# Patient Record
Sex: FEMALE | Race: WHITE | NOT HISPANIC OR LATINO | Employment: OTHER | URBAN - METROPOLITAN AREA
[De-identification: names, ages, dates, MRNs, and addresses within clinical notes are randomized per-mention and may not be internally consistent; named-entity substitution may affect disease eponyms.]

---

## 2017-01-23 ENCOUNTER — ALLSCRIPTS OFFICE VISIT (OUTPATIENT)
Dept: OTHER | Facility: OTHER | Age: 57
End: 2017-01-23

## 2017-01-23 DIAGNOSIS — E11.9 TYPE 2 DIABETES MELLITUS WITHOUT COMPLICATIONS (HCC): ICD-10-CM

## 2017-01-23 DIAGNOSIS — E03.9 HYPOTHYROIDISM: ICD-10-CM

## 2017-01-23 DIAGNOSIS — R26.2 DIFFICULTY IN WALKING, NOT ELSEWHERE CLASSIFIED: ICD-10-CM

## 2017-01-23 DIAGNOSIS — I95.9 HYPOTENSION: ICD-10-CM

## 2017-01-23 DIAGNOSIS — E87.5 HYPERKALEMIA: ICD-10-CM

## 2017-01-23 DIAGNOSIS — Z00.00 ENCOUNTER FOR GENERAL ADULT MEDICAL EXAMINATION WITHOUT ABNORMAL FINDINGS: ICD-10-CM

## 2017-01-23 DIAGNOSIS — D64.9 ANEMIA: ICD-10-CM

## 2017-02-08 ENCOUNTER — APPOINTMENT (EMERGENCY)
Dept: RADIOLOGY | Facility: HOSPITAL | Age: 57
End: 2017-02-08
Payer: COMMERCIAL

## 2017-02-08 ENCOUNTER — HOSPITAL ENCOUNTER (EMERGENCY)
Facility: HOSPITAL | Age: 57
Discharge: HOME | End: 2017-02-08
Attending: EMERGENCY MEDICINE | Admitting: EMERGENCY MEDICINE
Payer: COMMERCIAL

## 2017-02-08 VITALS
TEMPERATURE: 97.3 F | RESPIRATION RATE: 18 BRPM | SYSTOLIC BLOOD PRESSURE: 102 MMHG | OXYGEN SATURATION: 94 % | WEIGHT: 101 LBS | DIASTOLIC BLOOD PRESSURE: 55 MMHG | HEART RATE: 60 BPM | BODY MASS INDEX: 21.11 KG/M2

## 2017-02-08 DIAGNOSIS — S05.12XA: Primary | ICD-10-CM

## 2017-02-08 DIAGNOSIS — I95.9 HYPOTENSION: ICD-10-CM

## 2017-02-08 DIAGNOSIS — R26.2 AMBULATORY DYSFUNCTION: ICD-10-CM

## 2017-02-08 PROCEDURE — 99284 EMERGENCY DEPT VISIT MOD MDM: CPT

## 2017-02-08 PROCEDURE — 70450 CT HEAD/BRAIN W/O DYE: CPT

## 2017-03-06 DIAGNOSIS — F79 INTELLECTUAL DISABILITY: ICD-10-CM

## 2017-03-06 DIAGNOSIS — R30.0 DYSURIA: ICD-10-CM

## 2017-03-06 DIAGNOSIS — F41.9 ANXIETY DISORDER: ICD-10-CM

## 2017-03-06 DIAGNOSIS — G80.9 CEREBRAL PALSY (HCC): ICD-10-CM

## 2017-03-06 DIAGNOSIS — R26.2 DIFFICULTY IN WALKING, NOT ELSEWHERE CLASSIFIED: ICD-10-CM

## 2017-03-06 DIAGNOSIS — J18.9 PNEUMONIA: ICD-10-CM

## 2017-03-10 ENCOUNTER — APPOINTMENT (OUTPATIENT)
Dept: LAB | Age: 57
End: 2017-03-10
Payer: COMMERCIAL

## 2017-03-10 ENCOUNTER — TRANSCRIBE ORDERS (OUTPATIENT)
Dept: ADMINISTRATIVE | Age: 57
End: 2017-03-10

## 2017-03-10 DIAGNOSIS — R30.0 DYSURIA: ICD-10-CM

## 2017-03-10 LAB
BACTERIA UR QL AUTO: ABNORMAL /HPF
BILIRUB UR QL STRIP: NEGATIVE
CAOX CRY URNS QL MICRO: ABNORMAL /HPF
CLARITY UR: ABNORMAL
COLOR UR: YELLOW
GLUCOSE UR STRIP-MCNC: NEGATIVE MG/DL
HGB UR QL STRIP.AUTO: NEGATIVE
KETONES UR STRIP-MCNC: NEGATIVE MG/DL
LEUKOCYTE ESTERASE UR QL STRIP: ABNORMAL
NITRITE UR QL STRIP: POSITIVE
NON-SQ EPI CELLS URNS QL MICRO: ABNORMAL /HPF
PH UR STRIP.AUTO: 7 [PH] (ref 4.5–8)
PROT UR STRIP-MCNC: NEGATIVE MG/DL
RBC #/AREA URNS AUTO: ABNORMAL /HPF
SP GR UR STRIP.AUTO: 1.02 (ref 1–1.03)
UROBILINOGEN UR QL STRIP.AUTO: 0.2 E.U./DL
WBC #/AREA URNS AUTO: ABNORMAL /HPF

## 2017-03-10 PROCEDURE — 87077 CULTURE AEROBIC IDENTIFY: CPT

## 2017-03-10 PROCEDURE — 87186 SC STD MICRODIL/AGAR DIL: CPT

## 2017-03-10 PROCEDURE — 87086 URINE CULTURE/COLONY COUNT: CPT

## 2017-03-10 PROCEDURE — 81001 URINALYSIS AUTO W/SCOPE: CPT

## 2017-03-12 LAB — BACTERIA UR CULT: NORMAL

## 2017-03-23 ENCOUNTER — ALLSCRIPTS OFFICE VISIT (OUTPATIENT)
Dept: OTHER | Facility: OTHER | Age: 57
End: 2017-03-23

## 2017-03-27 ENCOUNTER — HOSPITAL ENCOUNTER (INPATIENT)
Facility: HOSPITAL | Age: 57
LOS: 1 days | Discharge: HOME HEALTH | DRG: 194 | End: 2017-03-29
Attending: EMERGENCY MEDICINE | Admitting: FAMILY MEDICINE
Payer: COMMERCIAL

## 2017-03-27 ENCOUNTER — APPOINTMENT (EMERGENCY)
Dept: RADIOLOGY | Facility: HOSPITAL | Age: 57
DRG: 194 | End: 2017-03-27
Payer: COMMERCIAL

## 2017-03-27 ENCOUNTER — GENERIC CONVERSION - ENCOUNTER (OUTPATIENT)
Dept: OTHER | Facility: OTHER | Age: 57
End: 2017-03-27

## 2017-03-27 DIAGNOSIS — R79.89 LACTATE BLOOD INCREASE: ICD-10-CM

## 2017-03-27 DIAGNOSIS — J18.9 COMMUNITY ACQUIRED PNEUMONIA: Primary | ICD-10-CM

## 2017-03-27 DIAGNOSIS — R09.02 HYPOXIA: ICD-10-CM

## 2017-03-27 PROBLEM — E78.5 HLD (HYPERLIPIDEMIA): Status: ACTIVE | Noted: 2017-03-27

## 2017-03-27 PROBLEM — G80.9 CP (CEREBRAL PALSY) (HCC): Status: ACTIVE | Noted: 2017-03-27

## 2017-03-27 PROBLEM — R25.2 SPASTICITY: Status: ACTIVE | Noted: 2017-03-27

## 2017-03-27 PROBLEM — K21.9 GERD (GASTROESOPHAGEAL REFLUX DISEASE): Status: ACTIVE | Noted: 2017-03-27

## 2017-03-27 PROBLEM — F31.9 BIPOLAR 1 DISORDER (HCC): Status: ACTIVE | Noted: 2017-03-27

## 2017-03-27 PROBLEM — F39 MOOD DISORDER (HCC): Status: ACTIVE | Noted: 2017-03-27

## 2017-03-27 PROBLEM — I10 HTN (HYPERTENSION): Status: ACTIVE | Noted: 2017-03-27

## 2017-03-27 PROBLEM — R26.2 AMBULATORY DYSFUNCTION: Status: ACTIVE | Noted: 2017-03-27

## 2017-03-27 PROBLEM — N39.0 CHRONIC UTI: Status: ACTIVE | Noted: 2017-03-27

## 2017-03-27 LAB
ALBUMIN SERPL BCP-MCNC: 2.5 G/DL (ref 3.5–5)
ALP SERPL-CCNC: 50 U/L (ref 46–116)
ALT SERPL W P-5'-P-CCNC: 14 U/L (ref 12–78)
ANION GAP SERPL CALCULATED.3IONS-SCNC: 5 MMOL/L (ref 4–13)
APTT PPP: 22 SECONDS (ref 24–36)
AST SERPL W P-5'-P-CCNC: 22 U/L (ref 5–45)
BASOPHILS # BLD AUTO: 0.02 THOUSANDS/ΜL (ref 0–0.1)
BASOPHILS NFR BLD AUTO: 0 % (ref 0–1)
BILIRUB SERPL-MCNC: 0.21 MG/DL (ref 0.2–1)
BUN SERPL-MCNC: 16 MG/DL (ref 5–25)
CALCIUM SERPL-MCNC: 9.5 MG/DL (ref 8.3–10.1)
CHLORIDE SERPL-SCNC: 102 MMOL/L (ref 100–108)
CO2 SERPL-SCNC: 33 MMOL/L (ref 21–32)
CREAT SERPL-MCNC: 0.89 MG/DL (ref 0.6–1.3)
EOSINOPHIL # BLD AUTO: 0.01 THOUSAND/ΜL (ref 0–0.61)
EOSINOPHIL NFR BLD AUTO: 0 % (ref 0–6)
ERYTHROCYTE [DISTWIDTH] IN BLOOD BY AUTOMATED COUNT: 13.3 % (ref 11.6–15.1)
GFR SERPL CREATININE-BSD FRML MDRD: >60 ML/MIN/1.73SQ M
GLUCOSE SERPL-MCNC: 156 MG/DL (ref 65–140)
GLUCOSE SERPL-MCNC: 191 MG/DL (ref 65–140)
HCT VFR BLD AUTO: 35.7 % (ref 34.8–46.1)
HGB BLD-MCNC: 11.8 G/DL (ref 11.5–15.4)
INR PPP: 0.99 (ref 0.86–1.16)
L PNEUMO1 AG UR QL IA.RAPID: NEGATIVE
LACTATE SERPL-SCNC: 1.4 MMOL/L (ref 0.5–2)
LACTATE SERPL-SCNC: 3.5 MMOL/L (ref 0.5–2)
LIPASE SERPL-CCNC: 139 U/L (ref 73–393)
LYMPHOCYTES # BLD AUTO: 1.11 THOUSANDS/ΜL (ref 0.6–4.47)
LYMPHOCYTES NFR BLD AUTO: 9 % (ref 14–44)
MCH RBC QN AUTO: 32.8 PG (ref 26.8–34.3)
MCHC RBC AUTO-ENTMCNC: 33.1 G/DL (ref 31.4–37.4)
MCV RBC AUTO: 99 FL (ref 82–98)
MONOCYTES # BLD AUTO: 0.93 THOUSAND/ΜL (ref 0.17–1.22)
MONOCYTES NFR BLD AUTO: 8 % (ref 4–12)
NEUTROPHILS # BLD AUTO: 9.77 THOUSANDS/ΜL (ref 1.85–7.62)
NEUTS SEG NFR BLD AUTO: 83 % (ref 43–75)
NRBC BLD AUTO-RTO: 0 /100 WBCS
PLATELET # BLD AUTO: 232 THOUSANDS/UL (ref 149–390)
PMV BLD AUTO: 10.7 FL (ref 8.9–12.7)
POTASSIUM SERPL-SCNC: 4.9 MMOL/L (ref 3.5–5.3)
PROT SERPL-MCNC: 7.3 G/DL (ref 6.4–8.2)
PROTHROMBIN TIME: 13.2 SECONDS (ref 12–14.3)
RBC # BLD AUTO: 3.6 MILLION/UL (ref 3.81–5.12)
S PNEUM AG UR QL: NEGATIVE
SODIUM SERPL-SCNC: 140 MMOL/L (ref 136–145)
SPECIMEN SOURCE: NORMAL
TROPONIN I BLD-MCNC: 0 NG/ML (ref 0–0.08)
WBC # BLD AUTO: 11.9 THOUSAND/UL (ref 4.31–10.16)

## 2017-03-27 PROCEDURE — 74177 CT ABD & PELVIS W/CONTRAST: CPT

## 2017-03-27 PROCEDURE — 96360 HYDRATION IV INFUSION INIT: CPT

## 2017-03-27 PROCEDURE — 87449 NOS EACH ORGANISM AG IA: CPT | Performed by: FAMILY MEDICINE

## 2017-03-27 PROCEDURE — 83605 ASSAY OF LACTIC ACID: CPT | Performed by: EMERGENCY MEDICINE

## 2017-03-27 PROCEDURE — 87040 BLOOD CULTURE FOR BACTERIA: CPT | Performed by: EMERGENCY MEDICINE

## 2017-03-27 PROCEDURE — 80053 COMPREHEN METABOLIC PANEL: CPT | Performed by: EMERGENCY MEDICINE

## 2017-03-27 PROCEDURE — 94760 N-INVAS EAR/PLS OXIMETRY 1: CPT

## 2017-03-27 PROCEDURE — 85610 PROTHROMBIN TIME: CPT | Performed by: EMERGENCY MEDICINE

## 2017-03-27 PROCEDURE — 93005 ELECTROCARDIOGRAM TRACING: CPT | Performed by: FAMILY MEDICINE

## 2017-03-27 PROCEDURE — 36415 COLL VENOUS BLD VENIPUNCTURE: CPT | Performed by: EMERGENCY MEDICINE

## 2017-03-27 PROCEDURE — 99285 EMERGENCY DEPT VISIT HI MDM: CPT

## 2017-03-27 PROCEDURE — 83690 ASSAY OF LIPASE: CPT | Performed by: EMERGENCY MEDICINE

## 2017-03-27 PROCEDURE — 71020 HB CHEST X-RAY 2VW FRONTAL&LATL: CPT

## 2017-03-27 PROCEDURE — 85730 THROMBOPLASTIN TIME PARTIAL: CPT | Performed by: EMERGENCY MEDICINE

## 2017-03-27 PROCEDURE — 96361 HYDRATE IV INFUSION ADD-ON: CPT

## 2017-03-27 PROCEDURE — 82948 REAGENT STRIP/BLOOD GLUCOSE: CPT

## 2017-03-27 PROCEDURE — 85025 COMPLETE CBC W/AUTO DIFF WBC: CPT | Performed by: EMERGENCY MEDICINE

## 2017-03-27 PROCEDURE — 84484 ASSAY OF TROPONIN QUANT: CPT

## 2017-03-27 PROCEDURE — 93005 ELECTROCARDIOGRAM TRACING: CPT | Performed by: EMERGENCY MEDICINE

## 2017-03-27 RX ORDER — OMEPRAZOLE 20 MG/1
20 CAPSULE, DELAYED RELEASE ORAL DAILY
COMMUNITY
End: 2019-11-26

## 2017-03-27 RX ORDER — MELATONIN
2000 DAILY
Status: DISCONTINUED | OUTPATIENT
Start: 2017-03-28 | End: 2017-03-29 | Stop reason: HOSPADM

## 2017-03-27 RX ORDER — LORAZEPAM 0.5 MG/1
0.5 TABLET ORAL 2 TIMES DAILY
Status: DISCONTINUED | OUTPATIENT
Start: 2017-03-27 | End: 2017-03-29 | Stop reason: HOSPADM

## 2017-03-27 RX ORDER — CITALOPRAM 20 MG/1
40 TABLET ORAL EVERY MORNING
Status: DISCONTINUED | OUTPATIENT
Start: 2017-03-28 | End: 2017-03-29 | Stop reason: HOSPADM

## 2017-03-27 RX ORDER — DIVALPROEX SODIUM 500 MG/1
1000 TABLET, DELAYED RELEASE ORAL DAILY
Status: DISCONTINUED | OUTPATIENT
Start: 2017-03-28 | End: 2017-03-29 | Stop reason: HOSPADM

## 2017-03-27 RX ORDER — PANTOPRAZOLE SODIUM 20 MG/1
20 TABLET, DELAYED RELEASE ORAL
Status: DISCONTINUED | OUTPATIENT
Start: 2017-03-28 | End: 2017-03-29 | Stop reason: HOSPADM

## 2017-03-27 RX ORDER — NITROFURANTOIN MACROCRYSTALS 50 MG/1
50 CAPSULE ORAL
Status: DISCONTINUED | OUTPATIENT
Start: 2017-03-27 | End: 2017-03-29 | Stop reason: HOSPADM

## 2017-03-27 RX ORDER — CALCIUM CARBONATE 500(1250)
1000 TABLET ORAL DAILY
Status: DISCONTINUED | OUTPATIENT
Start: 2017-03-28 | End: 2017-03-29 | Stop reason: HOSPADM

## 2017-03-27 RX ORDER — OXYBUTYNIN CHLORIDE 5 MG/1
5 TABLET, EXTENDED RELEASE ORAL 3 TIMES DAILY
Status: DISCONTINUED | OUTPATIENT
Start: 2017-03-27 | End: 2017-03-29 | Stop reason: HOSPADM

## 2017-03-27 RX ORDER — ARIPIPRAZOLE 15 MG/1
30 TABLET ORAL
Status: DISCONTINUED | OUTPATIENT
Start: 2017-03-27 | End: 2017-03-27 | Stop reason: CLARIF

## 2017-03-27 RX ORDER — CEFTRIAXONE 2 G/1
2000 INJECTION, POWDER, FOR SOLUTION INTRAMUSCULAR; INTRAVENOUS ONCE
Status: DISCONTINUED | OUTPATIENT
Start: 2017-03-27 | End: 2017-03-27

## 2017-03-27 RX ORDER — LORATADINE 10 MG/1
10 TABLET ORAL DAILY
Status: DISCONTINUED | OUTPATIENT
Start: 2017-03-28 | End: 2017-03-29 | Stop reason: HOSPADM

## 2017-03-27 RX ORDER — DOCUSATE SODIUM 100 MG/1
100 CAPSULE, LIQUID FILLED ORAL 2 TIMES DAILY
Status: DISCONTINUED | OUTPATIENT
Start: 2017-03-27 | End: 2017-03-29 | Stop reason: HOSPADM

## 2017-03-27 RX ORDER — OLOPATADINE HYDROCHLORIDE 2 MG/ML
1 SOLUTION/ DROPS OPHTHALMIC AS NEEDED
COMMUNITY

## 2017-03-27 RX ORDER — PRAVASTATIN SODIUM 20 MG
20 TABLET ORAL
Status: DISCONTINUED | OUTPATIENT
Start: 2017-03-28 | End: 2017-03-29 | Stop reason: HOSPADM

## 2017-03-27 RX ORDER — FEXOFENADINE HCL 180 MG/1
180 TABLET ORAL AS NEEDED
COMMUNITY

## 2017-03-27 RX ORDER — LEVOTHYROXINE SODIUM 0.03 MG/1
25 TABLET ORAL
Status: DISCONTINUED | OUTPATIENT
Start: 2017-03-28 | End: 2017-03-29 | Stop reason: HOSPADM

## 2017-03-27 RX ORDER — TRAZODONE HYDROCHLORIDE 100 MG/1
100 TABLET ORAL
Status: DISCONTINUED | OUTPATIENT
Start: 2017-03-27 | End: 2017-03-29 | Stop reason: HOSPADM

## 2017-03-27 RX ORDER — SODIUM CHLORIDE AND POTASSIUM CHLORIDE .9; .15 G/100ML; G/100ML
100 SOLUTION INTRAVENOUS CONTINUOUS
Status: DISCONTINUED | OUTPATIENT
Start: 2017-03-27 | End: 2017-03-29

## 2017-03-27 RX ORDER — ACETAMINOPHEN 325 MG/1
650 TABLET ORAL ONCE
Status: COMPLETED | OUTPATIENT
Start: 2017-03-27 | End: 2017-03-27

## 2017-03-27 RX ORDER — AZITHROMYCIN 250 MG/1
250 TABLET, FILM COATED ORAL EVERY 24 HOURS
Status: DISCONTINUED | OUTPATIENT
Start: 2017-03-28 | End: 2017-03-28

## 2017-03-27 RX ORDER — ARIPIPRAZOLE 15 MG/1
30 TABLET ORAL
Status: DISCONTINUED | OUTPATIENT
Start: 2017-03-27 | End: 2017-03-29 | Stop reason: HOSPADM

## 2017-03-27 RX ORDER — FERROUS SULFATE 325(65) MG
325 TABLET ORAL 2 TIMES DAILY WITH MEALS
Status: DISCONTINUED | OUTPATIENT
Start: 2017-03-28 | End: 2017-03-29 | Stop reason: HOSPADM

## 2017-03-27 RX ORDER — AZITHROMYCIN 250 MG/1
250 TABLET, FILM COATED ORAL ONCE
Status: DISCONTINUED | OUTPATIENT
Start: 2017-03-28 | End: 2017-03-27

## 2017-03-27 RX ORDER — ACETAMINOPHEN 325 MG/1
650 TABLET ORAL EVERY 6 HOURS PRN
Status: DISCONTINUED | OUTPATIENT
Start: 2017-03-27 | End: 2017-03-29 | Stop reason: HOSPADM

## 2017-03-27 RX ORDER — POLYETHYLENE GLYCOL 3350 17 G/17G
17 POWDER, FOR SOLUTION ORAL EVERY MORNING
Status: DISCONTINUED | OUTPATIENT
Start: 2017-03-28 | End: 2017-03-29 | Stop reason: HOSPADM

## 2017-03-27 RX ORDER — ALENDRONATE SODIUM 70 MG/1
70 TABLET ORAL
Status: DISCONTINUED | OUTPATIENT
Start: 2017-03-27 | End: 2017-03-27

## 2017-03-27 RX ADMIN — TRAZODONE HYDROCHLORIDE 100 MG: 100 TABLET ORAL at 23:25

## 2017-03-27 RX ADMIN — ACETAMINOPHEN 650 MG: 325 TABLET, FILM COATED ORAL at 16:12

## 2017-03-27 RX ADMIN — SODIUM CHLORIDE 1000 ML: 0.9 INJECTION, SOLUTION INTRAVENOUS at 18:11

## 2017-03-27 RX ADMIN — ARIPIPRAZOLE 30 MG: 15 TABLET ORAL at 23:23

## 2017-03-27 RX ADMIN — DOCUSATE SODIUM 100 MG: 100 CAPSULE, LIQUID FILLED ORAL at 21:53

## 2017-03-27 RX ADMIN — LORAZEPAM 0.5 MG: 0.5 TABLET ORAL at 21:53

## 2017-03-27 RX ADMIN — OXYBUTYNIN CHLORIDE 5 MG: 5 TABLET, EXTENDED RELEASE ORAL at 23:24

## 2017-03-27 RX ADMIN — IOHEXOL 85 ML: 350 INJECTION, SOLUTION INTRAVENOUS at 17:44

## 2017-03-27 RX ADMIN — AZITHROMYCIN MONOHYDRATE 500 MG: 500 INJECTION, POWDER, LYOPHILIZED, FOR SOLUTION INTRAVENOUS at 23:07

## 2017-03-27 RX ADMIN — SODIUM CHLORIDE 1000 ML: 0.9 INJECTION, SOLUTION INTRAVENOUS at 16:27

## 2017-03-27 RX ADMIN — CEFTRIAXONE 1000 MG: 1 INJECTION, POWDER, FOR SOLUTION INTRAMUSCULAR; INTRAVENOUS at 22:20

## 2017-03-27 RX ADMIN — NITROFURANTOIN (MACROCRYSTALS) 50 MG: 50 CAPSULE ORAL at 23:24

## 2017-03-27 RX ADMIN — METFORMIN HYDROCHLORIDE 1000 MG: 500 TABLET, FILM COATED ORAL at 21:53

## 2017-03-28 ENCOUNTER — APPOINTMENT (INPATIENT)
Dept: OCCUPATIONAL THERAPY | Facility: HOSPITAL | Age: 57
DRG: 194 | End: 2017-03-28
Payer: COMMERCIAL

## 2017-03-28 LAB
ANION GAP SERPL CALCULATED.3IONS-SCNC: 4 MMOL/L (ref 4–13)
ATRIAL RATE: 166 BPM
ATRIAL RATE: 59 BPM
BUN SERPL-MCNC: 9 MG/DL (ref 5–25)
CALCIUM SERPL-MCNC: 7.9 MG/DL (ref 8.3–10.1)
CHLORIDE SERPL-SCNC: 114 MMOL/L (ref 100–108)
CO2 SERPL-SCNC: 31 MMOL/L (ref 21–32)
CREAT SERPL-MCNC: 0.64 MG/DL (ref 0.6–1.3)
ERYTHROCYTE [DISTWIDTH] IN BLOOD BY AUTOMATED COUNT: 13.6 % (ref 11.6–15.1)
GFR SERPL CREATININE-BSD FRML MDRD: >60 ML/MIN/1.73SQ M
GLUCOSE P FAST SERPL-MCNC: 82 MG/DL (ref 65–99)
GLUCOSE SERPL-MCNC: 119 MG/DL (ref 65–140)
GLUCOSE SERPL-MCNC: 134 MG/DL (ref 65–140)
GLUCOSE SERPL-MCNC: 158 MG/DL (ref 65–140)
GLUCOSE SERPL-MCNC: 82 MG/DL (ref 65–140)
GLUCOSE SERPL-MCNC: 88 MG/DL (ref 65–140)
HCT VFR BLD AUTO: 30.3 % (ref 34.8–46.1)
HGB BLD-MCNC: 9.8 G/DL (ref 11.5–15.4)
MCH RBC QN AUTO: 32.3 PG (ref 26.8–34.3)
MCHC RBC AUTO-ENTMCNC: 32.3 G/DL (ref 31.4–37.4)
MCV RBC AUTO: 100 FL (ref 82–98)
P AXIS: 11 DEGREES
PLATELET # BLD AUTO: 193 THOUSANDS/UL (ref 149–390)
PMV BLD AUTO: 10.7 FL (ref 8.9–12.7)
POTASSIUM SERPL-SCNC: 4.1 MMOL/L (ref 3.5–5.3)
PR INTERVAL: 136 MS
QRS AXIS: 71 DEGREES
QRS AXIS: 86 DEGREES
QRSD INTERVAL: 60 MS
QRSD INTERVAL: 70 MS
QT INTERVAL: 348 MS
QT INTERVAL: 460 MS
QTC INTERVAL: 416 MS
QTC INTERVAL: 455 MS
RBC # BLD AUTO: 3.03 MILLION/UL (ref 3.81–5.12)
SODIUM SERPL-SCNC: 149 MMOL/L (ref 136–145)
T WAVE AXIS: 63 DEGREES
T WAVE AXIS: 75 DEGREES
VENTRICULAR RATE: 59 BPM
VENTRICULAR RATE: 86 BPM
WBC # BLD AUTO: 8.57 THOUSAND/UL (ref 4.31–10.16)

## 2017-03-28 PROCEDURE — 97166 OT EVAL MOD COMPLEX 45 MIN: CPT

## 2017-03-28 PROCEDURE — 87086 URINE CULTURE/COLONY COUNT: CPT | Performed by: FAMILY MEDICINE

## 2017-03-28 PROCEDURE — G8988 SELF CARE GOAL STATUS: HCPCS

## 2017-03-28 PROCEDURE — G8980 MOBILITY D/C STATUS: HCPCS

## 2017-03-28 PROCEDURE — G8987 SELF CARE CURRENT STATUS: HCPCS

## 2017-03-28 PROCEDURE — 82948 REAGENT STRIP/BLOOD GLUCOSE: CPT

## 2017-03-28 PROCEDURE — 85027 COMPLETE CBC AUTOMATED: CPT | Performed by: FAMILY MEDICINE

## 2017-03-28 PROCEDURE — 97162 PT EVAL MOD COMPLEX 30 MIN: CPT

## 2017-03-28 PROCEDURE — G8979 MOBILITY GOAL STATUS: HCPCS

## 2017-03-28 PROCEDURE — G8978 MOBILITY CURRENT STATUS: HCPCS

## 2017-03-28 PROCEDURE — 80048 BASIC METABOLIC PNL TOTAL CA: CPT | Performed by: FAMILY MEDICINE

## 2017-03-28 PROCEDURE — G8989 SELF CARE D/C STATUS: HCPCS

## 2017-03-28 RX ADMIN — NITROFURANTOIN (MACROCRYSTALS) 50 MG: 50 CAPSULE ORAL at 22:02

## 2017-03-28 RX ADMIN — LORAZEPAM 0.5 MG: 0.5 TABLET ORAL at 08:54

## 2017-03-28 RX ADMIN — OXYBUTYNIN CHLORIDE 5 MG: 5 TABLET, EXTENDED RELEASE ORAL at 15:53

## 2017-03-28 RX ADMIN — Medication 1000 MG: at 08:55

## 2017-03-28 RX ADMIN — Medication 325 MG: at 15:53

## 2017-03-28 RX ADMIN — CEFEPIME HYDROCHLORIDE 2000 MG: 1 INJECTION, POWDER, FOR SOLUTION INTRAMUSCULAR; INTRAVENOUS at 23:13

## 2017-03-28 RX ADMIN — CITALOPRAM HYDROBROMIDE 40 MG: 20 TABLET ORAL at 08:54

## 2017-03-28 RX ADMIN — ARIPIPRAZOLE 30 MG: 15 TABLET ORAL at 22:02

## 2017-03-28 RX ADMIN — ENOXAPARIN SODIUM 40 MG: 40 INJECTION SUBCUTANEOUS at 08:54

## 2017-03-28 RX ADMIN — SODIUM CHLORIDE AND POTASSIUM CHLORIDE 100 ML/HR: .9; .15 SOLUTION INTRAVENOUS at 11:42

## 2017-03-28 RX ADMIN — OXYBUTYNIN CHLORIDE 5 MG: 5 TABLET, EXTENDED RELEASE ORAL at 22:02

## 2017-03-28 RX ADMIN — SODIUM CHLORIDE 500 ML: 0.9 INJECTION, SOLUTION INTRAVENOUS at 00:34

## 2017-03-28 RX ADMIN — Medication 325 MG: at 08:54

## 2017-03-28 RX ADMIN — PANTOPRAZOLE SODIUM 20 MG: 20 TABLET, DELAYED RELEASE ORAL at 05:05

## 2017-03-28 RX ADMIN — METFORMIN HYDROCHLORIDE 1000 MG: 500 TABLET, FILM COATED ORAL at 17:40

## 2017-03-28 RX ADMIN — SODIUM CHLORIDE AND POTASSIUM CHLORIDE 100 ML/HR: .9; .15 SOLUTION INTRAVENOUS at 01:38

## 2017-03-28 RX ADMIN — TRAZODONE HYDROCHLORIDE 100 MG: 100 TABLET ORAL at 22:02

## 2017-03-28 RX ADMIN — VITAMIN D, TAB 1000IU (100/BT) 2000 UNITS: 25 TAB at 08:54

## 2017-03-28 RX ADMIN — SODIUM CHLORIDE AND POTASSIUM CHLORIDE 100 ML/HR: .9; .15 SOLUTION INTRAVENOUS at 22:03

## 2017-03-28 RX ADMIN — OXYBUTYNIN CHLORIDE 5 MG: 5 TABLET, EXTENDED RELEASE ORAL at 08:55

## 2017-03-28 RX ADMIN — LORAZEPAM 0.5 MG: 0.5 TABLET ORAL at 17:40

## 2017-03-28 RX ADMIN — DIVALPROEX SODIUM 1000 MG: 500 TABLET, DELAYED RELEASE ORAL at 08:55

## 2017-03-28 RX ADMIN — METFORMIN HYDROCHLORIDE 1000 MG: 500 TABLET, FILM COATED ORAL at 08:54

## 2017-03-28 RX ADMIN — LEVOTHYROXINE SODIUM 25 MCG: 25 TABLET ORAL at 05:05

## 2017-03-28 RX ADMIN — INSULIN LISPRO 1 UNITS: 100 INJECTION, SOLUTION INTRAVENOUS; SUBCUTANEOUS at 22:02

## 2017-03-28 RX ADMIN — GENTAMICIN SULFATE 230 MG: 40 INJECTION, SOLUTION INTRAMUSCULAR; INTRAVENOUS at 17:36

## 2017-03-28 RX ADMIN — PRAVASTATIN SODIUM 20 MG: 20 TABLET ORAL at 15:53

## 2017-03-28 RX ADMIN — LORATADINE 10 MG: 10 TABLET ORAL at 08:54

## 2017-03-29 VITALS
BODY MASS INDEX: 21.51 KG/M2 | HEIGHT: 60 IN | RESPIRATION RATE: 16 BRPM | SYSTOLIC BLOOD PRESSURE: 107 MMHG | HEART RATE: 77 BPM | OXYGEN SATURATION: 97 % | TEMPERATURE: 97.8 F | DIASTOLIC BLOOD PRESSURE: 62 MMHG | WEIGHT: 109.57 LBS

## 2017-03-29 LAB
ANION GAP SERPL CALCULATED.3IONS-SCNC: 5 MMOL/L (ref 4–13)
BACTERIA UR CULT: NORMAL
BASOPHILS # BLD AUTO: 0.01 THOUSANDS/ΜL (ref 0–0.1)
BASOPHILS NFR BLD AUTO: 0 % (ref 0–1)
BUN SERPL-MCNC: 8 MG/DL (ref 5–25)
CALCIUM SERPL-MCNC: 9.4 MG/DL (ref 8.3–10.1)
CHLORIDE SERPL-SCNC: 115 MMOL/L (ref 100–108)
CO2 SERPL-SCNC: 32 MMOL/L (ref 21–32)
CREAT SERPL-MCNC: 0.74 MG/DL (ref 0.6–1.3)
EOSINOPHIL # BLD AUTO: 0.26 THOUSAND/ΜL (ref 0–0.61)
EOSINOPHIL NFR BLD AUTO: 3 % (ref 0–6)
ERYTHROCYTE [DISTWIDTH] IN BLOOD BY AUTOMATED COUNT: 14.1 % (ref 11.6–15.1)
GFR SERPL CREATININE-BSD FRML MDRD: >60 ML/MIN/1.73SQ M
GLUCOSE SERPL-MCNC: 107 MG/DL (ref 65–140)
GLUCOSE SERPL-MCNC: 120 MG/DL (ref 65–140)
GLUCOSE SERPL-MCNC: 96 MG/DL (ref 65–140)
HCT VFR BLD AUTO: 32.5 % (ref 34.8–46.1)
HGB BLD-MCNC: 10.3 G/DL (ref 11.5–15.4)
LYMPHOCYTES # BLD AUTO: 2.01 THOUSANDS/ΜL (ref 0.6–4.47)
LYMPHOCYTES NFR BLD AUTO: 24 % (ref 14–44)
MCH RBC QN AUTO: 32.5 PG (ref 26.8–34.3)
MCHC RBC AUTO-ENTMCNC: 31.7 G/DL (ref 31.4–37.4)
MCV RBC AUTO: 103 FL (ref 82–98)
MONOCYTES # BLD AUTO: 0.94 THOUSAND/ΜL (ref 0.17–1.22)
MONOCYTES NFR BLD AUTO: 11 % (ref 4–12)
NEUTROPHILS # BLD AUTO: 4.99 THOUSANDS/ΜL (ref 1.85–7.62)
NEUTS SEG NFR BLD AUTO: 62 % (ref 43–75)
NRBC BLD AUTO-RTO: 0 /100 WBCS
PLATELET # BLD AUTO: 227 THOUSANDS/UL (ref 149–390)
PMV BLD AUTO: 11.3 FL (ref 8.9–12.7)
POTASSIUM SERPL-SCNC: 4.9 MMOL/L (ref 3.5–5.3)
RBC # BLD AUTO: 3.17 MILLION/UL (ref 3.81–5.12)
SODIUM SERPL-SCNC: 152 MMOL/L (ref 136–145)
WBC # BLD AUTO: 8.25 THOUSAND/UL (ref 4.31–10.16)

## 2017-03-29 PROCEDURE — 82948 REAGENT STRIP/BLOOD GLUCOSE: CPT

## 2017-03-29 PROCEDURE — 85025 COMPLETE CBC W/AUTO DIFF WBC: CPT | Performed by: FAMILY MEDICINE

## 2017-03-29 PROCEDURE — 80048 BASIC METABOLIC PNL TOTAL CA: CPT | Performed by: FAMILY MEDICINE

## 2017-03-29 RX ORDER — MILK BASED FORMULA
1 PUDDING (GRAM) ORAL
Qty: 100 CAN | Refills: 3 | Status: SHIPPED | OUTPATIENT
Start: 2017-03-29 | End: 2018-06-08

## 2017-03-29 RX ORDER — DOXYCYCLINE 100 MG/1
100 TABLET ORAL 2 TIMES DAILY
Qty: 12 TABLET | Refills: 0 | Status: SHIPPED | OUTPATIENT
Start: 2017-03-29 | End: 2017-04-04

## 2017-03-29 RX ORDER — NITROFURANTOIN MACROCRYSTALS 50 MG/1
50 CAPSULE ORAL
Qty: 30 CAPSULE | Refills: 0 | Status: SHIPPED | OUTPATIENT
Start: 2017-03-29 | End: 2017-04-28

## 2017-03-29 RX ADMIN — LORAZEPAM 0.5 MG: 0.5 TABLET ORAL at 08:28

## 2017-03-29 RX ADMIN — PANTOPRAZOLE SODIUM 20 MG: 20 TABLET, DELAYED RELEASE ORAL at 05:24

## 2017-03-29 RX ADMIN — Medication 325 MG: at 08:28

## 2017-03-29 RX ADMIN — LORATADINE 10 MG: 10 TABLET ORAL at 08:27

## 2017-03-29 RX ADMIN — LEVOTHYROXINE SODIUM 25 MCG: 25 TABLET ORAL at 05:24

## 2017-03-29 RX ADMIN — Medication 1000 MG: at 08:30

## 2017-03-29 RX ADMIN — VITAMIN D, TAB 1000IU (100/BT) 2000 UNITS: 25 TAB at 08:27

## 2017-03-29 RX ADMIN — CEFEPIME HYDROCHLORIDE 2000 MG: 1 INJECTION, POWDER, FOR SOLUTION INTRAMUSCULAR; INTRAVENOUS at 08:27

## 2017-03-29 RX ADMIN — DIVALPROEX SODIUM 1000 MG: 500 TABLET, DELAYED RELEASE ORAL at 08:31

## 2017-03-29 RX ADMIN — METFORMIN HYDROCHLORIDE 1000 MG: 500 TABLET, FILM COATED ORAL at 08:27

## 2017-03-29 RX ADMIN — OXYBUTYNIN CHLORIDE 5 MG: 5 TABLET, EXTENDED RELEASE ORAL at 08:30

## 2017-03-29 RX ADMIN — ENOXAPARIN SODIUM 40 MG: 40 INJECTION SUBCUTANEOUS at 08:28

## 2017-03-29 RX ADMIN — CITALOPRAM HYDROBROMIDE 40 MG: 20 TABLET ORAL at 08:27

## 2017-04-01 LAB
BACTERIA BLD CULT: NORMAL
BACTERIA BLD CULT: NORMAL

## 2017-04-05 ENCOUNTER — ALLSCRIPTS OFFICE VISIT (OUTPATIENT)
Dept: OTHER | Facility: OTHER | Age: 57
End: 2017-04-05

## 2017-04-14 ENCOUNTER — TRANSCRIBE ORDERS (OUTPATIENT)
Dept: ADMINISTRATIVE | Facility: HOSPITAL | Age: 57
End: 2017-04-14

## 2017-04-14 ENCOUNTER — APPOINTMENT (OUTPATIENT)
Dept: LAB | Facility: MEDICAL CENTER | Age: 57
End: 2017-04-14
Payer: COMMERCIAL

## 2017-04-14 DIAGNOSIS — F31.9 BIPOLAR DISORDER, UNSPECIFIED (HCC): ICD-10-CM

## 2017-04-14 DIAGNOSIS — D64.9 ANEMIA, UNSPECIFIED: Primary | ICD-10-CM

## 2017-04-14 DIAGNOSIS — E11.9 TYPE 2 DIABETES MELLITUS WITHOUT COMPLICATION, UNSPECIFIED LONG TERM INSULIN USE STATUS: ICD-10-CM

## 2017-04-14 DIAGNOSIS — D64.9 ANEMIA, UNSPECIFIED: ICD-10-CM

## 2017-04-14 DIAGNOSIS — E87.5 HYPERPOTASSEMIA: ICD-10-CM

## 2017-04-14 DIAGNOSIS — E03.9 UNSPECIFIED HYPOTHYROIDISM: ICD-10-CM

## 2017-04-14 DIAGNOSIS — I95.9 HYPOTENSION, UNSPECIFIED: ICD-10-CM

## 2017-04-14 DIAGNOSIS — F31.9 BIPOLAR DISORDER, UNSPECIFIED (HCC): Primary | ICD-10-CM

## 2017-04-14 LAB
ALBUMIN SERPL BCP-MCNC: 2.9 G/DL (ref 3.5–5)
ALP SERPL-CCNC: 48 U/L (ref 46–116)
ALT SERPL W P-5'-P-CCNC: 21 U/L (ref 12–78)
AMMONIA PLAS-SCNC: <10 UMOL/L (ref 11–35)
ANION GAP SERPL CALCULATED.3IONS-SCNC: 6 MMOL/L (ref 4–13)
AST SERPL W P-5'-P-CCNC: 17 U/L (ref 5–45)
BASOPHILS # BLD AUTO: 0.02 THOUSANDS/ΜL (ref 0–0.1)
BASOPHILS NFR BLD AUTO: 0 % (ref 0–1)
BILIRUB DIRECT SERPL-MCNC: 0.1 MG/DL (ref 0–0.2)
BILIRUB SERPL-MCNC: 0.2 MG/DL (ref 0.2–1)
BUN SERPL-MCNC: 14 MG/DL (ref 5–25)
CALCIUM SERPL-MCNC: 9 MG/DL (ref 8.3–10.1)
CHLORIDE SERPL-SCNC: 104 MMOL/L (ref 100–108)
CK SERPL-CCNC: 29 U/L (ref 26–192)
CO2 SERPL-SCNC: 35 MMOL/L (ref 21–32)
CREAT SERPL-MCNC: 0.74 MG/DL (ref 0.6–1.3)
EOSINOPHIL # BLD AUTO: 0.35 THOUSAND/ΜL (ref 0–0.61)
EOSINOPHIL NFR BLD AUTO: 6 % (ref 0–6)
ERYTHROCYTE [DISTWIDTH] IN BLOOD BY AUTOMATED COUNT: 13.9 % (ref 11.6–15.1)
EST. AVERAGE GLUCOSE BLD GHB EST-MCNC: 140 MG/DL
GFR SERPL CREATININE-BSD FRML MDRD: >60 ML/MIN/1.73SQ M
GLUCOSE P FAST SERPL-MCNC: 103 MG/DL (ref 65–99)
HBA1C MFR BLD: 6.5 % (ref 4.2–6.3)
HCT VFR BLD AUTO: 36.9 % (ref 34.8–46.1)
HGB BLD-MCNC: 11.7 G/DL (ref 11.5–15.4)
LYMPHOCYTES # BLD AUTO: 1.82 THOUSANDS/ΜL (ref 0.6–4.47)
LYMPHOCYTES NFR BLD AUTO: 32 % (ref 14–44)
MCH RBC QN AUTO: 32.1 PG (ref 26.8–34.3)
MCHC RBC AUTO-ENTMCNC: 31.7 G/DL (ref 31.4–37.4)
MCV RBC AUTO: 101 FL (ref 82–98)
MONOCYTES # BLD AUTO: 0.42 THOUSAND/ΜL (ref 0.17–1.22)
MONOCYTES NFR BLD AUTO: 7 % (ref 4–12)
NEUTROPHILS # BLD AUTO: 3.12 THOUSANDS/ΜL (ref 1.85–7.62)
NEUTS SEG NFR BLD AUTO: 55 % (ref 43–75)
PLATELET # BLD AUTO: 200 THOUSANDS/UL (ref 149–390)
PMV BLD AUTO: 11.7 FL (ref 8.9–12.7)
POTASSIUM SERPL-SCNC: 4.6 MMOL/L (ref 3.5–5.3)
PROT SERPL-MCNC: 7 G/DL (ref 6.4–8.2)
RBC # BLD AUTO: 3.64 MILLION/UL (ref 3.81–5.12)
SODIUM SERPL-SCNC: 145 MMOL/L (ref 136–145)
T3 SERPL-MCNC: 0.8 NG/ML (ref 0.6–1.8)
T4 SERPL-MCNC: 10.4 UG/DL (ref 4.7–13.3)
TSH SERPL DL<=0.05 MIU/L-ACNC: 2.23 UIU/ML (ref 0.36–3.74)
VALPROATE SERPL-MCNC: 29 UG/ML (ref 50–100)
WBC # BLD AUTO: 5.73 THOUSAND/UL (ref 4.31–10.16)

## 2017-04-14 PROCEDURE — 80053 COMPREHEN METABOLIC PANEL: CPT

## 2017-04-14 PROCEDURE — 36415 COLL VENOUS BLD VENIPUNCTURE: CPT | Performed by: FAMILY MEDICINE

## 2017-04-14 PROCEDURE — 83036 HEMOGLOBIN GLYCOSYLATED A1C: CPT | Performed by: FAMILY MEDICINE

## 2017-04-14 PROCEDURE — 82140 ASSAY OF AMMONIA: CPT

## 2017-04-14 PROCEDURE — 84443 ASSAY THYROID STIM HORMONE: CPT

## 2017-04-14 PROCEDURE — 85025 COMPLETE CBC W/AUTO DIFF WBC: CPT

## 2017-04-14 PROCEDURE — 84436 ASSAY OF TOTAL THYROXINE: CPT

## 2017-04-14 PROCEDURE — 82550 ASSAY OF CK (CPK): CPT

## 2017-04-14 PROCEDURE — 80164 ASSAY DIPROPYLACETIC ACD TOT: CPT

## 2017-04-14 PROCEDURE — 84480 ASSAY TRIIODOTHYRONINE (T3): CPT

## 2017-04-14 PROCEDURE — 82248 BILIRUBIN DIRECT: CPT

## 2017-04-21 ENCOUNTER — ALLSCRIPTS OFFICE VISIT (OUTPATIENT)
Dept: OTHER | Facility: OTHER | Age: 57
End: 2017-04-21

## 2017-04-21 DIAGNOSIS — H61.20 IMPACTED CERUMEN: ICD-10-CM

## 2017-04-21 DIAGNOSIS — Z12.31 ENCOUNTER FOR SCREENING MAMMOGRAM FOR MALIGNANT NEOPLASM OF BREAST: ICD-10-CM

## 2017-04-21 DIAGNOSIS — J18.9 PNEUMONIA: ICD-10-CM

## 2017-05-04 ENCOUNTER — TRANSCRIBE ORDERS (OUTPATIENT)
Dept: ADMINISTRATIVE | Age: 57
End: 2017-05-04

## 2017-05-04 ENCOUNTER — HOSPITAL ENCOUNTER (OUTPATIENT)
Dept: RADIOLOGY | Age: 57
Discharge: HOME | End: 2017-05-04
Payer: COMMERCIAL

## 2017-05-04 DIAGNOSIS — J18.9 PNEUMONIA, ORGANISM UNSPECIFIED(486): Primary | ICD-10-CM

## 2017-05-04 DIAGNOSIS — J18.9 PNEUMONIA, ORGANISM UNSPECIFIED(486): ICD-10-CM

## 2017-05-04 PROCEDURE — 71020 HB CHEST X-RAY 2VW FRONTAL&LATL: CPT

## 2017-05-09 ENCOUNTER — ALLSCRIPTS OFFICE VISIT (OUTPATIENT)
Dept: OTHER | Facility: OTHER | Age: 57
End: 2017-05-09

## 2017-05-22 ENCOUNTER — GENERIC CONVERSION - ENCOUNTER (OUTPATIENT)
Dept: OTHER | Facility: OTHER | Age: 57
End: 2017-05-22

## 2017-05-22 DIAGNOSIS — E11.9 TYPE 2 DIABETES MELLITUS WITHOUT COMPLICATIONS (HCC): ICD-10-CM

## 2017-05-22 DIAGNOSIS — E03.9 HYPOTHYROIDISM: ICD-10-CM

## 2017-05-24 ENCOUNTER — HOSPITAL ENCOUNTER (OUTPATIENT)
Dept: RADIOLOGY | Age: 57
Discharge: HOME | End: 2017-05-24
Payer: COMMERCIAL

## 2017-05-24 DIAGNOSIS — H61.20 IMPACTED CERUMEN: ICD-10-CM

## 2017-05-24 DIAGNOSIS — Z12.31 ENCOUNTER FOR SCREENING MAMMOGRAM FOR MALIGNANT NEOPLASM OF BREAST: ICD-10-CM

## 2017-05-24 PROCEDURE — G0202 SCR MAMMO BI INCL CAD: HCPCS

## 2017-06-22 ENCOUNTER — APPOINTMENT (OUTPATIENT)
Dept: LAB | Facility: MEDICAL CENTER | Age: 57
End: 2017-06-22
Payer: COMMERCIAL

## 2017-06-22 DIAGNOSIS — E11.9 TYPE 2 DIABETES MELLITUS WITHOUT COMPLICATIONS (HCC): ICD-10-CM

## 2017-06-22 DIAGNOSIS — E03.9 HYPOTHYROIDISM: ICD-10-CM

## 2017-06-22 LAB
EST. AVERAGE GLUCOSE BLD GHB EST-MCNC: 134 MG/DL
HBA1C MFR BLD: 6.3 % (ref 4.2–6.3)
TSH SERPL DL<=0.05 MIU/L-ACNC: 2.64 UIU/ML (ref 0.36–3.74)

## 2017-06-22 PROCEDURE — 83036 HEMOGLOBIN GLYCOSYLATED A1C: CPT

## 2017-06-22 PROCEDURE — 36415 COLL VENOUS BLD VENIPUNCTURE: CPT

## 2017-06-22 PROCEDURE — 84443 ASSAY THYROID STIM HORMONE: CPT

## 2017-06-29 ENCOUNTER — ALLSCRIPTS OFFICE VISIT (OUTPATIENT)
Dept: OTHER | Facility: OTHER | Age: 57
End: 2017-06-29

## 2017-07-25 ENCOUNTER — ANESTHESIA EVENT (OUTPATIENT)
Dept: GASTROENTEROLOGY | Facility: HOSPITAL | Age: 57
End: 2017-07-25
Payer: COMMERCIAL

## 2017-07-25 ENCOUNTER — HOSPITAL ENCOUNTER (OUTPATIENT)
Facility: HOSPITAL | Age: 57
Setting detail: OUTPATIENT SURGERY
Discharge: HOME | End: 2017-07-25
Attending: SURGERY | Admitting: SURGERY
Payer: COMMERCIAL

## 2017-07-25 ENCOUNTER — ANESTHESIA (OUTPATIENT)
Dept: GASTROENTEROLOGY | Facility: HOSPITAL | Age: 57
End: 2017-07-25
Payer: COMMERCIAL

## 2017-07-25 VITALS
HEART RATE: 69 BPM | SYSTOLIC BLOOD PRESSURE: 102 MMHG | TEMPERATURE: 96 F | WEIGHT: 106 LBS | BODY MASS INDEX: 20.81 KG/M2 | HEIGHT: 60 IN | DIASTOLIC BLOOD PRESSURE: 73 MMHG | OXYGEN SATURATION: 96 % | RESPIRATION RATE: 16 BRPM

## 2017-07-25 RX ORDER — PROPOFOL 10 MG/ML
INJECTION, EMULSION INTRAVENOUS AS NEEDED
Status: DISCONTINUED | OUTPATIENT
Start: 2017-07-25 | End: 2017-07-25 | Stop reason: SURG

## 2017-07-25 RX ORDER — SODIUM CHLORIDE 9 MG/ML
125 INJECTION, SOLUTION INTRAVENOUS CONTINUOUS
Status: DISCONTINUED | OUTPATIENT
Start: 2017-07-25 | End: 2017-07-25 | Stop reason: HOSPADM

## 2017-07-25 RX ADMIN — SODIUM CHLORIDE 125 ML/HR: 0.9 INJECTION, SOLUTION INTRAVENOUS at 09:22

## 2017-07-25 RX ADMIN — PROPOFOL 70 MG: 10 INJECTION, EMULSION INTRAVENOUS at 10:44

## 2017-07-25 RX ADMIN — PROPOFOL 30 MG: 10 INJECTION, EMULSION INTRAVENOUS at 10:51

## 2017-09-07 ENCOUNTER — ALLSCRIPTS OFFICE VISIT (OUTPATIENT)
Dept: OTHER | Facility: OTHER | Age: 57
End: 2017-09-07

## 2017-09-18 ENCOUNTER — GENERIC CONVERSION - ENCOUNTER (OUTPATIENT)
Dept: OTHER | Facility: OTHER | Age: 57
End: 2017-09-18

## 2017-09-18 DIAGNOSIS — R63.4 ABNORMAL WEIGHT LOSS: ICD-10-CM

## 2017-09-18 DIAGNOSIS — R79.9 ABNORMAL FINDING OF BLOOD CHEMISTRY: ICD-10-CM

## 2017-09-18 DIAGNOSIS — E78.5 HYPERLIPIDEMIA: ICD-10-CM

## 2017-09-19 ENCOUNTER — GENERIC CONVERSION - ENCOUNTER (OUTPATIENT)
Dept: OTHER | Facility: OTHER | Age: 57
End: 2017-09-19

## 2017-10-17 ENCOUNTER — APPOINTMENT (OUTPATIENT)
Dept: LAB | Facility: CLINIC | Age: 57
End: 2017-10-17
Payer: COMMERCIAL

## 2017-10-17 ENCOUNTER — TRANSCRIBE ORDERS (OUTPATIENT)
Dept: LAB | Facility: CLINIC | Age: 57
End: 2017-10-17

## 2017-10-17 DIAGNOSIS — R79.9 ABNORMAL BLOOD CHEMISTRY: ICD-10-CM

## 2017-10-17 DIAGNOSIS — R63.4 LOSS OF WEIGHT: ICD-10-CM

## 2017-10-17 DIAGNOSIS — E78.5 HYPERLIPIDEMIA, UNSPECIFIED HYPERLIPIDEMIA TYPE: ICD-10-CM

## 2017-10-17 DIAGNOSIS — F31.9 DEPRESSED BIPOLAR I DISORDER (HCC): Primary | ICD-10-CM

## 2017-10-17 DIAGNOSIS — F31.9 DEPRESSED BIPOLAR I DISORDER (HCC): ICD-10-CM

## 2017-10-17 LAB
ALBUMIN SERPL BCP-MCNC: 3.1 G/DL (ref 3.5–5)
ALP SERPL-CCNC: 49 U/L (ref 46–116)
ALT SERPL W P-5'-P-CCNC: 22 U/L (ref 12–78)
AMMONIA PLAS-SCNC: <10 UMOL/L (ref 11–35)
ANION GAP SERPL CALCULATED.3IONS-SCNC: 5 MMOL/L (ref 4–13)
AST SERPL W P-5'-P-CCNC: 19 U/L (ref 5–45)
BASOPHILS # BLD AUTO: 0.06 THOUSANDS/ΜL (ref 0–0.1)
BASOPHILS NFR BLD AUTO: 1 % (ref 0–1)
BILIRUB DIRECT SERPL-MCNC: 0.05 MG/DL (ref 0–0.2)
BILIRUB SERPL-MCNC: 0.2 MG/DL (ref 0.2–1)
BUN SERPL-MCNC: 17 MG/DL (ref 5–25)
CALCIUM SERPL-MCNC: 9.5 MG/DL (ref 8.3–10.1)
CHLORIDE SERPL-SCNC: 102 MMOL/L (ref 100–108)
CHOLEST SERPL-MCNC: 157 MG/DL (ref 50–200)
CO2 SERPL-SCNC: 34 MMOL/L (ref 21–32)
CREAT SERPL-MCNC: 0.82 MG/DL (ref 0.6–1.3)
EOSINOPHIL # BLD AUTO: 0.5 THOUSAND/ΜL (ref 0–0.61)
EOSINOPHIL NFR BLD AUTO: 8 % (ref 0–6)
ERYTHROCYTE [DISTWIDTH] IN BLOOD BY AUTOMATED COUNT: 13.2 % (ref 11.6–15.1)
GFR SERPL CREATININE-BSD FRML MDRD: 81 ML/MIN/1.73SQ M
GLUCOSE P FAST SERPL-MCNC: 112 MG/DL (ref 65–99)
HCT VFR BLD AUTO: 38.7 % (ref 34.8–46.1)
HDLC SERPL-MCNC: 50 MG/DL (ref 40–60)
HGB BLD-MCNC: 12.5 G/DL (ref 11.5–15.4)
LDLC SERPL CALC-MCNC: 78 MG/DL (ref 0–100)
LYMPHOCYTES # BLD AUTO: 1.83 THOUSANDS/ΜL (ref 0.6–4.47)
LYMPHOCYTES NFR BLD AUTO: 29 % (ref 14–44)
MCH RBC QN AUTO: 32.8 PG (ref 26.8–34.3)
MCHC RBC AUTO-ENTMCNC: 32.3 G/DL (ref 31.4–37.4)
MCV RBC AUTO: 102 FL (ref 82–98)
MONOCYTES # BLD AUTO: 0.53 THOUSAND/ΜL (ref 0.17–1.22)
MONOCYTES NFR BLD AUTO: 9 % (ref 4–12)
NEUTROPHILS # BLD AUTO: 3.33 THOUSANDS/ΜL (ref 1.85–7.62)
NEUTS SEG NFR BLD AUTO: 53 % (ref 43–75)
PLATELET # BLD AUTO: 154 THOUSANDS/UL (ref 149–390)
PMV BLD AUTO: 11.8 FL (ref 8.9–12.7)
POTASSIUM SERPL-SCNC: 4.7 MMOL/L (ref 3.5–5.3)
PROT SERPL-MCNC: 6.9 G/DL (ref 6.4–8.2)
RBC # BLD AUTO: 3.81 MILLION/UL (ref 3.81–5.12)
SODIUM SERPL-SCNC: 141 MMOL/L (ref 136–145)
T3 SERPL-MCNC: 1.2 NG/ML (ref 0.6–1.8)
T4 SERPL-MCNC: 11.7 UG/DL (ref 4.7–13.3)
TRIGL SERPL-MCNC: 144 MG/DL
TSH SERPL DL<=0.05 MIU/L-ACNC: 3.94 UIU/ML (ref 0.36–3.74)
WBC # BLD AUTO: 6.25 THOUSAND/UL (ref 4.31–10.16)

## 2017-10-17 PROCEDURE — 84480 ASSAY TRIIODOTHYRONINE (T3): CPT

## 2017-10-17 PROCEDURE — 80061 LIPID PANEL: CPT

## 2017-10-17 PROCEDURE — 82140 ASSAY OF AMMONIA: CPT

## 2017-10-17 PROCEDURE — 80165 DIPROPYLACETIC ACID FREE: CPT

## 2017-10-17 PROCEDURE — 84436 ASSAY OF TOTAL THYROXINE: CPT

## 2017-10-17 PROCEDURE — 80053 COMPREHEN METABOLIC PANEL: CPT

## 2017-10-17 PROCEDURE — 82248 BILIRUBIN DIRECT: CPT

## 2017-10-17 PROCEDURE — 85025 COMPLETE CBC W/AUTO DIFF WBC: CPT

## 2017-10-17 PROCEDURE — 36415 COLL VENOUS BLD VENIPUNCTURE: CPT

## 2017-10-17 PROCEDURE — 84443 ASSAY THYROID STIM HORMONE: CPT

## 2017-10-19 ENCOUNTER — ALLSCRIPTS OFFICE VISIT (OUTPATIENT)
Dept: OTHER | Facility: OTHER | Age: 57
End: 2017-10-19

## 2017-10-19 ENCOUNTER — LAB REQUISITION (OUTPATIENT)
Dept: LAB | Facility: HOSPITAL | Age: 57
End: 2017-10-19
Payer: COMMERCIAL

## 2017-10-19 DIAGNOSIS — Z01.419 ENCOUNTER FOR GYNECOLOGICAL EXAMINATION WITHOUT ABNORMAL FINDING: ICD-10-CM

## 2017-10-19 LAB — VALPROATE FREE SERPL-MCNC: 5.7 UG/ML (ref 6–22)

## 2017-10-19 PROCEDURE — 87624 HPV HI-RISK TYP POOLED RSLT: CPT | Performed by: NURSE PRACTITIONER

## 2017-10-19 PROCEDURE — G0143 SCR C/V CYTO,THINLAYER,RESCR: HCPCS | Performed by: NURSE PRACTITIONER

## 2017-10-23 LAB — HPV RRNA GENITAL QL NAA+PROBE: NORMAL

## 2017-10-28 LAB
LAB AP GYN PRIMARY INTERPRETATION: NORMAL
Lab: NORMAL

## 2017-11-01 NOTE — PROGRESS NOTES
Assessment    1  History of Colostomy  2  Encounter for routine gynecological examination (V72 31) (Z01 419)    Plan  Encounter for routine gynecological examination    · (1) THIN PREP PAP WITH IMAGING; Status:Active; Requested YOSEF:[id number];   Perform:[facility] In Office Collection; DOZ:49ZCO9445; Ordered; For:Encounter for routine gynecological examination; Ordered By:Lorna Avina;  PAP Maturation index required? : NoLMP: : 2009Reflex HPV? : Regardless of Interpretation    Discussion/Summary  Currently, she eats an adequate diet and has an inadequate exercise regimen  the risks and benefits of cervical cancer screening were discussed HPV and Pap Co-testing Done Today Breast cancer screening: caregivers complete breast exams monthly  Colorectal cancer screening: colorectal cancer screening is managed by PCP/ [facility]  Osteoporosis screening: bone mineral density testing is managed by PCP/ [facility]  Advice and education were given regarding fall risk reduction  55 y/o G0 with profound intellectual disabilities here for annual examAnnual well woman exam  - Pap with co-testing today, pt tolerated well  - Mammogram current Next due 5/20181 year or sooner as needed  The patient has the current Goals: Annual exam  The patent has the current Barriers: Intellectual disability- Nurse Marquita Roberto accompanied pt  Patient is unable to Self-Care: [facility]  Chief Complaint  Pt here today for annual       History of Present Illness  HPI: 58y/o G0 with profound intellectual disability here for annual exam accompanied by nurse  Last pap 9/2012 resulted NILM and HR HPV negative  Last mammogram 5/2017 normal with recommendations for mammogram in 1 year  LMP 2009  Denies postmenopausal bleeding  No GYN concerns today   GYN HM, Adult Female [facility]: The patient is being seen for a gynecology and annual evaluation  The last health maintenance visit was year(s) ago    Social History: She is unmarried  The patient has never smoked cigarettes  She reports never drinking alcohol  She has never used illicit drugs  ([facility] lives in [facility])  General Health: The patient's health since the last visit is described as good  She has regular dental visits  -- She denies vision problems  -- She denies hearing loss  Immunizations status: up to date  Lifestyle:  She consumes a diverse and healthy diet  -- She does not have any weight concerns  -- She does not exercise regularly  -- She does not use tobacco -- She denies alcohol use  -- She denies drug use  Reproductive health: the patient is postmenopausal--   she reports no menstrual problems  Menstrual history: Menstrual Problems: 2009- -- she uses no contraception  -- she is not sexually active  -- she denies prior pregnancies G 0  Screening: cancer screening reviewed and updated  metabolic screening reviewed and current  risk screening reviewed and current  Review of Systems  no pelvic pain,-- no pelvic pressure,-- no vaginal pain-- and-- no vaginal discharge  Constitutional: No fever, no chills, feels well, no tiredness, no recent weight gain or loss  ENT: no ear ache, no loss of hearing, no nosebleeds or nasal discharge, no sore throat or hoarseness  Cardiovascular: no complaints of slow or fast heart rate, no chest pain, no palpitations, no leg claudication or lower extremity edema  Respiratory: no complaints of shortness of breath, no wheezing, no dyspnea on exertion, no orthopnea or PND  Breasts: no complaints of breast pain, breast lump or nipple discharge  Gastrointestinal: no complaints of abdominal pain, no constipation, no nausea or diarrhea, no vomiting, no bloody stools  Genitourinary: no complaints of dysuria, no incontinence, no pelvic pain, no dysmenorrhea, no vaginal discharge or abnormal vaginal bleeding  Musculoskeletal: no complaints of arthralgia, no myalgia, no joint swelling or stiffness, no limb pain or swelling  Integumentary: no complaints of skin rash or lesion, no itching or dry skin, no skin wounds  Neurological: no complaints of headache, no confusion, no numbness or tingling, no dizziness or fainting  Over the past 2 weeks, how often have you been bothered by the following problems? 1 ) Little interest or pleasure in doing things? Not at all   2 ) Feeling down, depressed or hopeless? Not at all   3 ) Trouble falling asleep or sleeping too much? Not at all   4 ) Feeling tired or having little energy? Several days  5 ) Poor appetite or overeating? Not at all   6 ) Feeling bad about yourself, or that you are a failure, or have let yourself or your family down? Not at all   7 ) Trouble concentrating on things, such as reading a newspaper or watching television? Not at all   8 ) Moving or speaking so slowly that other people could have noticed, or the opposite, moving or speaking faster than usual? Not at all   9 ) Thoughts that you would be better off dead or of hurting yourself in some way? Not at all  Score 1     ROS reviewed  OB History  Pregnancy History (Brief):  Prior pregnancies: : 0  Para: Active Problems  1  Abnormal albumin (790 99) (R77 0)  2  Abnormal blood chemistry (790 6) (R79 9)  3  Abrasion (919 0) (T14 8XXA)  4  Absolute anemia (285 9) (D64 9)  5  Acute conjunctivitis (372 00) (H10 30)  6  Allergic rhinitis due to pollen (477 0) (J30 1)  7  Altered mental status, unspecified (780 97) (R41 82)  8  Ambulatory dysfunction (719 7) (R26 2)  9  Anxiety (300 00) (F41 9)  10  Atopic dermatitis (691 8) (L20 9)  11  Bipolar disorder (296 80) (F31 9)  12  Cancer screening (V76 9) (Z12 9)  13  Cerebral palsy (343 9) (G80 9)  14  Cervical cancer screening (V76 2) (Z12 4)  15  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)  16  Chronic hypernatremia (276 0) (E87 0)  17  Chronic UTI (599 0) (N39 0)  18  Closed fracture of left hip, sequela (905 3) (S72 002S)  19   Colostomy in place (V44 3) (Z93 3)  20  Constipation (564 00) (K59 00)  21  Dehydration (276 51) (E86 0)  22  Difficulty walking (719 7) (R26 2)  23  Diverticulosis (562 10) (K57 90)  24  DMII (diabetes mellitus, type 2) (250 00) (E11 9)  25  Dry eye (375 15) (H04 129)  26  Dysuria (788 1) (R30 0)  27  Encounter for routine gynecological examination (V72 31) (Z01 419)  28  Encounter for screening mammogram for breast cancer (V76 12) (Z12 31)  29  Encounter for staple removal (V58 32) (Z48 02)  30  Eye redness (379 93) (H57 8)  31  Fatigue (780 79) (R53 83)  32  Frequent UTI (599 0) (N39 0)  33  Gait disturbance (781 2) (R26 9)  34  Gastroesophageal reflux disease without esophagitis (530 81) (K21 9)  35  Herpes labialis (054 9) (B00 1)  36  Hip fracture, left (820 8) (S72 002A)  37  Hospital discharge follow-up (V67 59) (Z09)  38  Hyperkalemia (276 7) (E87 5)  39  Hyperlipidemia (272 4) (E78 5)  40  Hypotension (458 9) (I95 9)  41  Hypothyroidism (244 9) (E03 9)  42  Impacted cerumen (380 4) (H61 20)  43  Influenza vaccine needed (V04 81) (Z23)  44  Intellectual disability (319) (F79)  45  Laceration of head (873 8) (S01 91XA)  46  Lump Or Swelling In The Neck - Left Side (784 2)  47  Medicare annual wellness visit, initial (V70 0) (Z00 00)  48  Menopause (627 2) (Z78 0)  49  Microalbuminuria (791 0) (R80 9)  50  Need for 23-polyvalent pneumococcal polysaccharide vaccine (V03 82) (Z23)  51  Need for prophylactic vaccination and inoculation against influenza (V04 81) (Z23)  52  Osteoporosis (733 00) (M81 0)  53  Other chronic pain (338 29) (G89 29)  54  Peripheral neuropathy (356 9) (G62 9)  55  Physical deconditioning (799 3) (R53 81)  56  Pneumonia (486) (J18 9)  57  Postmenopausal bleeding (627 1) (N95 0)  58  Resting tremor (781 0) (R25 9)  59  Seasonal allergies (477 9) (J30 2)  60  Severe intellectual disability (318 1) (F72)  61  Skin irritation (709 9) (R23 8)  62  Staring spell  63   Status post fall (V15 88) (Z91 81)  64  Symptoms involving urinary system (788 99) (R39 9)  65  Thrombocytopenia (287 5) (D69 6)  66  Tremor (781 0) (R25 1)  67  Tuberculosis screening (V74 1) (Z11 1)  68  Urge and stress incontinence (788 33) (N39 46)  69  Urinary incontinence (788 30) (R32)  70  Urinary tract infection (599 0) (N39 0)  71  Weight loss, non-intentional (783 21) (R63 4)    Past Medical History   · History of Abnormal gait (781 2) (R26 9)   · History of Adjustment disorder (309 9) (F43 20)   · History of Chronic constipation (564 00) (K59 09)   · History of bipolar disorder (V11 1) (Z86 59)   · History of hyperlipidemia (V12 29) (Z86 39)   · History of Impulse control disorder (312 30) (F63 9)   · History of Microalbuminuria (791 0) (R80 9)   · Need for prophylactic vaccination and inoculation against influenza (V04 81) (Z23)    The active problems and past medical history were reviewed and updated today  Surgical History   · History of Colostomy   · History of Partial Colectomy - Sigmoid    The surgical history was reviewed and updated today  Family History  Mother    · No pertinent family history    The family history was reviewed and updated today  Social History     · Lives in group home (V60 6) (Z59 3)   · Never A Smoker  The social history was reviewed and updated today  Current Meds  1  A+D Prevent External Ointment; Apply as needed; Therapy: 36XIA2930 to (Last Rx:22May2017) Ordered  2  Abilify 30 MG Oral Tablet; TAKE 1 TABLET DAILY; Therapy: 85WEG3744 to (Evaluate:07Jun2014)  Requested for: 92EWK9214; Last RC:57BLE7115 Ordered  3  Abreva 10 % External Cream; APPLY AND RUB IN 5 TIMES DAILY UNTIL HEALED PRN lesion; Therapy: 74ALZ6404 to (Last Rx:02Jun2014)  Requested for: 02Jun2014 Ordered  4  Alendronate Sodium 70 MG Oral Tablet; TAKE ONE TABLET BY MOUTH WEEKLY; Therapy: 39YGE3851 to (Evaluate:04Xhf2343)  Requested for: 21Jan2016; Last Rx:21Jan2016 Ordered  5   Allegra 180 MG TABS; TAKE ONE TABLET DAILY PRN; Therapy: (Ciales Rounds) to Recorded  6  Boost Pudding PUDG; TAKE ONE CONTAINER THREE TIMES DAILY WITH MEDICATION PASSES  (7A-4P-8P); Therapy: (Recorded:99Eqz4533) to Recorded  7  Calcium Carbonate 1250 (500 Ca) MG Oral Tablet; TAKE 1 TABLET Daily at 4 PM; Therapy: (Recorded:74Dii0969) to Recorded  8  Citalopram Hydrobromide 40 MG Oral Tablet; Take 1 tablet daily; Therapy: 79MKH8736 to (Jed Lundborg)  Requested for: 65MHL0813; Last :86IXO4028 Ordered  9  Colace 100 MG Oral Capsule; TAKE 1 CAPSULE TWICE DAILY; Therapy: 50Iga5236 to (Evaluate:07Apr2014); Last Rx:12Apr2013 Ordered  10  Debrox 6 5 % Otic Solution; INSTILL 2 DROP Daily PRN for 5 days; Therapy: 42Vco5652 to (Evaluate:21May2017)  Requested for: 21Apr2017; Last  Rx:21Apr2017 Ordered  11  DiazePAM 10 MG Oral Tablet; TAKE ONE TABLET 1/2 HOUR PRIOR TO  PROCEDURE  IF NEEDED, REPEAT ONE TABLET AT TIME OF PROCEDURE; Therapy: 39RSW3309 to (Evaluate:11May2016)  Requested for: 94HJW8663; Last  Rx:10May2016 Ordered  12  Ditropan 5 MG TABS; TAKE 1 TABLET 3 TIMES DAILY; Therapy: (Ciales Rounds) to Recorded  13  Divalproex Sodium 500 MG Oral Tablet Delayed Release; TAKE 2 TABLET Every  morning; Last FD:64VGE0908 Ordered  14  Ferrous Sulfate 325 (65 Fe) MG Oral Tablet; one tablet twice a day; Therapy: (Ciales Rounds) to Recorded  15  Hydrocortisone 1 % External Cream; APPLY SPARINGLY TO AFFECTED AREA(S)  TWICE DAILY; Therapy: 28XLN8046 to (Last Rx:02Jun2014)  Requested for: 02Jun2014 Ordered  16  Levothyroxine Sodium 25 MCG Oral Tablet; Take 1 tablet daily; Therapy: 22GKD6114 to (Evaluate:04Llj3594)  Requested for: 02JHT3774; Last  HB:55FXR2906 Ordered  17  LORazepam 0 5 MG Oral Tablet; Take 1 tablet twice daily; Therapy: 91NPX0337 to (Evaluate:07Jun2014); Last KL:07VFO2534 Ordered  18  MetFORMIN HCl - 1000 MG Oral Tablet; TAKE 1 TABLET TWICE DAILY; Therapy: 73NNE2637 to (Evaluate:50Iho8228) Recorded  19   MiraLax Oral Packet; [facility] PACKET IN 8 OUNCES OF LIQUID AND DRINK ONCE  DAILY; Therapy: (130 9178) to Recorded  20  Neosporin AF 2 % CREA; APPLY SPARINGLY TO AFFECTED AREA(S) TWICE DAILY  PRN; Therapy: (230 1991) to Recorded  21  Nitrofurantoin Macrocrystal 50 MG Oral Capsule; TAKE 1 CAPSULE AT BEDTIME; Therapy: 39Ghb7027 to (Evaluate:11May2016) Recorded  22  Omeprazole 20 MG Oral Tablet Delayed Release; take 1 tab po QD; Therapy: 09CXN2708 to (Last Rx:21Jan2016) Ordered  23  Pataday 0 2 % Ophthalmic Solution; instill one drop into affected eyes daily PRN; Therapy: (Inis ) to Recorded  24  Refresh SOLN; indtill 1 drop into affected eye(s) 3 times daily PRN eye irritation; Therapy: (Inis ) to Recorded  25  Simvastatin 20 MG Oral Tablet; TAKE 1 TABLET DAILY IN THE EVENING; Therapy: 58APG4365 to ([facility])  Requested for: 52WIY0257; Last  OD:14OPD6131 Ordered  26  TraZODone HCl - 100 MG Oral Tablet; TAKE 1 TABLET AT BEDTIME; Therapy: 59KNZ6003 to (Evaluate:07Jun2014)  Requested for: 00YMJ6056; Last  OY:05HCI6485 Ordered  27  Tylenol 325 MG Oral Tablet; 2 tabs po Q 4 hours prn pain /temp >101; Therapy: 69Qdx0422 to Recorded  28  Vitamin D 2000 UNIT Oral Capsule; Take 1 tablet PO QD at 4 pm;  Therapy: 57QBI8877 to (Luisito Job)  Requested for: 68VJV3493; Last  Rx:10Mar2016 Ordered    Allergies  1  No Known Drug Allergies    Vitals   Recorded: 55KEP2365 95:74KA   Systolic 95, LUE, Sitting   Diastolic 57, LUE, Sitting   Height 4 ft 10 in   Weight 116 lb 8 oz   BMI Calculated 24 35   BSA Calculated 1 45       Physical Exam   Constitutional  General appearance: No acute distress, well appearing and well nourished  chronically ill  -- wheel chair bound  Neck  Neck: Normal, supple, trachea midline, no masses  Thyroid: Normal, no thyromegaly  Pulmonary  Respiratory effort: No increased work of breathing or signs of respiratory distress     Auscultation of lungs: Clear to auscultation  Cardiovascular  Auscultation of heart: Normal rate and rhythm, normal S1 and S2, no murmurs  Peripheral vascular exam: Normal pulses Throughout  Genitourinary  External genitalia: Normal and no lesions appreciated  Vagina: Normal, no lesions or dryness appreciated  Urethral meatus: Normal    Bladder: Normal, soft, non-tender and no prolapse or masses appreciated  Cervix: Normal, no palpable masses  Examination of the cervix revealed no lesions  A Pap smear was performed  Bimanual exam findings include no cervical motion tenderness  Uterus: Normal, non-tender, not enlarged, and no palpable masses  Adnexa/parametria: Normal, non-tender and no fullness or masses appreciated  Anus, perineum, and rectum: Normal sphincter tone, no masses, and no prolapse  Chest  Breasts: Normal and no dimpling or skin changes noted  Abdomen  Abdomen: Normal, non-tender, and no organomegaly noted  Skin  Skin and subcutaneous tissue: Normal skin turgor and no rashes  Psychiatric  Orientation to person, place, and time: Normal    Mood and affect: Normal        Attending Note   Collaborating Physician Note: Collaborating Note:1  I agree with the Advanced Practitioner note1   I discussed the case with the Advanced Practitioner and reviewed the AP note1        1 Amended By: Cindy Rock; Nov 15 2017 8:45 PM EST    Future Appointments    Date/Time Provider Specialty Site   11/14/2017 10:00 AM LOURDES Valenzuela  [facility]   11/30/2017 01:40 PM LOURDES Howard   [address] [place] [address]   10/26/2017 02:20 PM Nurse Olamide [address]       Signatures   Electronically signed by : Shashank Washington; Oct 19 2017  2:13PM EST                       (Author)    Electronically signed by : LOURDES Landry ; Oct 31 2017  4:51PM EST                       (Acknowledgement)    Electronically signed by : Gianfranco Negro Kain Driscoll MD; Nov 15 2017  8:46PM EST                       (Author)

## 2017-11-14 ENCOUNTER — ALLSCRIPTS OFFICE VISIT (OUTPATIENT)
Dept: OTHER | Facility: OTHER | Age: 57
End: 2017-11-14

## 2017-11-16 NOTE — PROGRESS NOTES
Assessment    1  Cerebral palsy (343 9) (G80 9)   2  Gait disturbance (781 2) (R26 9)   3  Peripheral neuropathy (356 9) (G62 9)    Plan  Gait disturbance, Peripheral neuropathy    · Follow-up visit in 6 months Evaluation and Treatment  Follow-up  Status: Complete Done: [id number]   Ordered;Gait disturbance, Peripheral neuropathy; Ordered By: Krystyna Rowan Performed:  Due: 30LND4880; Last Updated By: Tereza Harris; 11/14/2017 11:07:07 AM    Discussion/Summary  Discussion Summary:   Staring spells for which the patient was originally referred to me back in January 2016 have long since resolved  We had figured out that they simply occurred when the patient was markedly debilitated from a series of medical setbacks  I have still been seeing her periodically to monitor her gait disturbance  Her caregiver is a very keen observer and pays close attention to the patientâs overall condition and what may be causing difficulties  At first, the patientâs gait problem was largely due to deconditioning  With physical therapy and stabilization of medical condition that component of it has improved  Patient was also malnourished, but has since gained weight and just needs additional protein intake  that contribute to her gait disturbance now are primarily her cerebral palsy and the patient does still get fatigued easily but much less so than she had been  Today we walked her down the brown of office and although she was leaning to the right when she started walking it was not until she walked almost all the way down the brown that she leaned to the right significantly  This is in comparison to in the past when she would lean even when she was sitting  am pleased with the progress in her ambulation and also in her alertness  Today the patient is alert and attentive but at same time also calm  from here on is to try and increase the patientâs protein intake which is difficult because she only eats what she want to and to get her new shoes specific for her diabetic neuropathy  Counseling Documentation With Imm: The patient, patient's caretaker was counseled regarding diagnostic results,-- instructions for management,-- risk factor reductions,-- prognosis,-- patient and family education,-- risks and benefits of treatment options,-- importance of compliance with treatment  total time of encounter was 50 minutes-- and-- 35 minutes was spent counseling  time in 10:10, time out 11:00      Chief Complaint  Chief Complaint Free Text Note Form: Patient present for follow up appointment regarding seizures      History of Present Illness  HPI: 11 month follow-up for a 56year-old right-handed female with mental retardation whom I saw for the first time in January 2016 when she was referred by Dr Aneesh To for change in mental status and new onset staring spells  Iain Clark note from 12/11/15 states that the patientâs caregiver had noted a slow change in mental status over a period of 6 months  Patient showed decreased activity, and less interest in group activities  Patient also has difficulty walking and had been leaning to right side  She had broken her left hip in July 2015  Patient had a âdazedâ look, with a wide open mouth for âseveral hoursâ, though she was totally awake and responsive  I saw the patient on 1/19/2016, patientâs gait and posture appeared to be related to discomfort in her left hip when seated and with weight bearing  She also had a Parkinsonian tremor  CT of the abdomen/pelvis/left hip and CT of LS spine were ordered  Previous CT head had shown evidence of both cortical and subcortical atrophy and cerebellar atrophy indicating that the patient had had long standing dementia and gait difficulties  Routine EEG was ordered to evaluate the staring spells  1/31/16, patient was admitted to [facility] with a UTI and sigmoid volvulus  Patient had an emergent decompressive colonoscopy   Patient did not do well post colonoscopy and remained distended with hypotension  Patient was taken to the OR on 1/31/16 for an exploratory laparotomy, left sigmoidectomy, coloproctostomy, and diverting loop ileostomy that resulted in septic shock requiring intubation and critical care in the ICU  Her postoperative course was complicated by ileus  Urine cultures grew Klebsiella and patient was given antibiotics  Patient also developed thrombocytopenia  Renal was consulted during the admission for the patientâs hypernatremia, which is chronic, and was given D5W to keep up with her free fluid intake  Patient was discharged on 2/17/16  patient was admitted to [address] [place] [address] again from 2/20 through 2/24/16 with dehydration and hyperkalemia  EEG performed 3/23/16 was markedly abnormal due to continuous low amplitude background intermixed theta and delta slowing  No focal abnormalities or interictal epileptiform discharges were noted  No electrographic seizures occurred  a series of follow-up visits, it became apparent that the patient Rubina De Guzman would occur mostly when she was debilitated or ill, and were not typical of epileptic seizures  lumbar spine performed 3/25/16 showed no acute fracture or subluxation  Minimal lumbar spondylosis   localizer view demonstrates deformity of the left hip, similar to the previous 1/20/16 CT, possibly related to prior fracture and/or contracture  Dedicated left hip radiographs may be of additional use  the patient was seen on 10/13/2016, the patientâs overall condition, including her gait, balance, and alertness had gradually improved since her discharge from the hospital with the help of physical therapy, but had not returned to baseline  I saw the patient on 5/9/17, her gait, balance, and alertness had improved since her last visit which had been most likely due to her repeated admissions for bowel problems  seen on 5/9/17    HISTORY:  level on 10/17/17acid (free) 5 7  level on 10/17/17 was <10on 10/18 was 3 1 (patientâs caregiver reports they are attempting to add more protein to the patientâs diet)  AEDs:500 mg tablet, 2 tabs in AM (for bipolar disorder)0 5 mg tablet, 1 tab BIDcaregiver brought her in a wheelchair today due to the patientâs walker being misplaced today  Otherwise, the patient is able to ambulate with a walker during the day  Caregiver reports that the patientâs gait is the same as it was on the last visit and she only fell one time over the last 6 months  Patient ambulated in the office with some assistance of hand-holding by her caregiver  Patient does tire, she leans to the right, and becomes unbalanced, and caregiver notes that the patient is not able to walk without assistance  The patientâs caregiver believes that physical therapy and use of a balance cushion has greatly helped the patient  has been quite stiff recently and the patientâs caregiver is concerned that it may be a side effect of her psych medications  At times a tremor is observable  They will be discussing this with the patientâs psychiatrist soon  denies any staring spells since her last visit and reports the patient is much more alert  INFORMATION REGARDING THE PATIENT'S SEIZURE HISTORY AS DOCUMENTED IN PREVIOUS VISITS HAS BEEN REVIEWED AND IS AVAILABLE BELOW AS WELL AS IN PREVIOUS OFFICE NOTES:note on 5/9/17:February, the patient tripped and fell, was evaluated in the ED then discharged home after examination and CT head showed no acute changes  was found to have a UTI on 3/10, then was admitted to the hospital from 3/27 through 3/29 for treatment of pneumonia  those setbacks however, patientâs gait, balance, alertness, and ability to communicate has improved since her last visit with the help of physical therapy visit continued up until just a few days ago    AEDs:500 mg tablet, 2 tabs in AM (for bipolar disorder) (started November 2011)0 5 mg tablet, 1 tab BIDSummary on 5/9/17:gait, balance, and alertness has improved since her last visit despite her having had pneumonia recently  I agree with her caregiver that both the mental status changes as well as the gait difficulty and generalized weakness are due to deconditioning from her repeated admissions for bowel problems, requiring surgery, last year, and it simply taking her quite a while to recover  She is almost at baseline now however as far as her alertness and ability to communicate, and hopefully additional improvement can take place over time without any additional intervening acute medical problems  Review of Systems  Neurological ROS:  Constitutional: no fever, no chills, no recent weight gain, no recent weight loss, no complaints of feeling tired, no changes in appetite  HEENT:  no sinus problems, not feeling congested, no blurred vision, no dryness of the eyes, no eye pain, no hearing loss, no tinnitus, no mouth sores, no sore throat, no hoarseness, no dysphagia, no masses, no bleeding  Cardiovascular:  no chest pain or pressure, no palpitations present, the heart rate was not rapid or irregular, no swelling in the arms or legs, no poor circulation  Respiratory:  no unusual or persistant cough, no shortness of breath with or without exertion  Gastrointestinal:  no nausea, no vomiting, no diarrhea, no abdominal pain, no changes in bowel habits, no melena, no loss of bowel control  Genitourinary:  no incontinence, no feelings of urinary urgency, no increase in frequency, no urinary hesitancy, no dysuria, no hematuria  Musculoskeletal:  no arthralgias, no myalgias, no immobility or loss of function, no head/neck/back pain, no pain while walking  Integumentary  no masses, no rash, no skin lesions, no livedo reticularis  Psychiatric: mood swings  Endocrine   no unusual weight loss or gain, no excessive urination, no excessive thirst, no hair loss or gain, no hot or cold intolerance, no menstrual period change or irregularity, no loss of sexual ability or drive, no erection difficulty, no nipple discharge  Hematologic/Lymphatic:  no unusual bleeding, no tendency for easy bruising, no clotting skin or lumps  Neurological General:  no headache, no nausea or vomiting, no lightheadedness, no convulsions, no blackouts, no syncope, no trauma, no photopsia, no increased sleepiness, no trouble falling asleep, no snoring, no awakening at night  Neurological Mental Status:  no confusion, no mood swings, no alteration or loss of consciousness, no difficulty expressing/understanding speech, no memory problems  Neurological Cranial Nerves:  no blurry or double vision, no loss of vision, no face drooping, no facial numbness or weakness, no taste or smell loss/changes, no hearing loss or ringing, no vertigo or dizziness, no dysphagia, no slurred speech  Neurological Motor findings include:  no tremor, no twitching, no cramping(pre/post exercise), no atrophy  Neurological Coordination: balance difficulties  Neurological Sensory:  no numbness, no pain, no tingling, does not fall when eyes closed or taking a shower  Neurological Gait: difficulty walking  ROS Reviewed:   ROS reviewed  Active Problems  1  Abnormal albumin (790 99) (R77 0)   2  Abnormal blood chemistry (790 6) (R79 9)   3  Abrasion (919 0) (T14 8XXA)   4  Absolute anemia (285 9) (D64 9)   5  Acute conjunctivitis (372 00) (H10 30)   6  Allergic rhinitis due to pollen (477 0) (J30 1)   7  Altered mental status, unspecified (780 97) (R41 82)   8  Ambulatory dysfunction (719 7) (R26 2)   9  Anxiety (300 00) (F41 9)   10  Atopic dermatitis (691 8) (L20 9)   11  Bipolar disorder (296 80) (F31 9)   12  Cancer screening (V76 9) (Z12 9)   13  Cerebral palsy (343 9) (G80 9)   14  Cervical cancer screening (V76 2) (Z12 4)   15  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)   16  Chronic hypernatremia (276 0) (E87 0)   17   Chronic UTI (599 0) (N39 0) 18  Closed fracture of left hip, sequela (905 3) (S72 002S)   19  Colostomy in place (V44 3) (Z93 3)   20  Constipation (564 00) (K59 00)   21  Dehydration (276 51) (E86 0)   22  Difficulty walking (719 7) (R26 2)   23  Diverticulosis (562 10) (K57 90)   24  DMII (diabetes mellitus, type 2) (250 00) (E11 9)   25  Dry eye (375 15) (H04 129)   26  Dysuria (788 1) (R30 0)   27  Encounter for routine gynecological examination (V72 31) (Z01 419)   28  Encounter for screening mammogram for breast cancer (V76 12) (Z12 31)   29  Encounter for staple removal (V58 32) (Z48 02)   30  Eye redness (379 93) (H57 8)   31  Fatigue (780 79) (R53 83)   32  Frequent UTI (599 0) (N39 0)   33  Gait disturbance (781 2) (R26 9)   34  Gastroesophageal reflux disease without esophagitis (530 81) (K21 9)   35  Herpes labialis (054 9) (B00 1)   36  Hip fracture, left (820 8) (S72 002A)   37  Hospital discharge follow-up (V67 59) (Z09)   38  Hyperkalemia (276 7) (E87 5)   39  Hyperlipidemia (272 4) (E78 5)   40  Hypotension (458 9) (I95 9)   41  Hypothyroidism (244 9) (E03 9)   42  Impacted cerumen (380 4) (H61 20)   43  Influenza vaccine needed (V04 81) (Z23)   44  Intellectual disability (319) (F79)   45  Laceration of head (873 8) (S01 91XA)   46  Lump Or Swelling In The Neck - Left Side (784 2)   47  Medicare annual wellness visit, initial (V70 0) (Z00 00)   48  Menopause (627 2) (Z78 0)   49  Microalbuminuria (791 0) (R80 9)   50  Need for 23-polyvalent pneumococcal polysaccharide vaccine (V03 82) (Z23)   51  Need for prophylactic vaccination and inoculation against influenza (V04 81) (Z23)   52  Osteoporosis (733 00) (M81 0)   53  Other chronic pain (338 29) (G89 29)   54  Peripheral neuropathy (356 9) (G62 9)   55  Physical deconditioning (799 3) (R53 81)   56  Pneumonia (486) (J18 9)   57  Postmenopausal bleeding (627 1) (N95 0)   58  Resting tremor (781 0) (R25 9)   59  Seasonal allergies (477 9) (J30 2)   60   Severe intellectual disability (318 1) (F72)   61  Skin irritation (709 9) (R23 8)   62  Staring spell   63  Status post fall (V15 88) (Z91 81)   64  Symptoms involving urinary system (788 99) (R39 9)   65  Thrombocytopenia (287 5) (D69 6)   66  Tremor (781 0) (R25 1)   67  Tuberculosis screening (V74 1) (Z11 1)   68  Urge and stress incontinence (788 33) (N39 46)   69  Urinary incontinence (788 30) (R32)   70  Urinary tract infection (599 0) (N39 0)   71  Weight loss, non-intentional (783 21) (R63 4)    Past Medical History  1  History of Abnormal gait (781 2) (R26 9)   2  History of Adjustment disorder (309 9) (F43 20)   3  History of Chronic constipation (564 00) (K59 09)   4  History of bipolar disorder (V11 1) (Z86 59)   5  History of hyperlipidemia (V12 29) (Z86 39)   6  History of Impulse control disorder (312 30) (F63 9)   7  History of Microalbuminuria (791 0) (R80 9)   8  Need for prophylactic vaccination and inoculation against influenza (V04 81) (Z23)  Active Problems And Past Medical History Reviewed: The active problems and past medical history were reviewed and updated today  Surgical History  1  History of Colostomy   2  History of Partial Colectomy - Sigmoid  Surgical History Reviewed: The surgical history was reviewed and updated today  Family History  Mother    1  No pertinent family history  Family History Reviewed: The family history was reviewed and updated today  Social History     · Lives in group home (V60 6) (Z59 3)   · Never A Smoker  Social History Reviewed: The social history was reviewed and updated today  The social history was reviewed and is unchanged  Current Meds   1  A+D Prevent External Ointment; Apply as needed; Therapy: 04XSP5705 to (Last Rx:22May2017) Ordered   2  Abilify 30 MG Oral Tablet; TAKE 1 TABLET DAILY; Therapy: 09ONO1220 to (Evaluate:07Jun2014)  Requested for: 18BYG5549; Last JT:46MHG6240 Ordered   3   Abreva 10 % External Cream; APPLY AND RUB IN 5 TIMES DAILY UNTIL HEALED PRN lesion; Therapy: 20JVF7830 to (Last Rx:02Jun2014)  Requested for: 02Jun2014 Ordered   4  Alendronate Sodium 70 MG Oral Tablet; TAKE ONE TABLET BY MOUTH WEEKLY; Therapy: 05FQI1393 to (Evaluate:86Zub9565)  Requested for: 21Jan2016; Last Rx:21Jan2016 Ordered   5  Allegra 180 MG TABS; TAKE ONE TABLET DAILY PRN; Therapy: (Lizzy Nickel) to Recorded   6  Boost Pudding PUDG; TAKE ONE CONTAINER THREE TIMES DAILY WITH MEDICATION PASSES  (7A-4P-8P); Therapy: (Recorded:11Rob6564) to Recorded   7  Calcium Carbonate 1250 (500 Ca) MG Oral Tablet; TAKE 1 TABLET Daily at 4 PM; Therapy: (Recorded:48Nft8063) to Recorded   8  Citalopram Hydrobromide 40 MG Oral Tablet; Take 1 tablet daily; Therapy: 67IPD9179 to (Hemalathaye Maxim)  Requested for: 58MZH3380; Last ZX:52HAB8787 Ordered   9  Colace 100 MG Oral Capsule; TAKE 1 CAPSULE TWICE DAILY; Therapy: 51Usl1471 to (Evaluate:07Apr2014); Last Rx:12Apr2013 Ordered   10  Debrox 6 5 % Otic Solution; INSTILL 2 DROP Daily PRN for 5 days; Therapy: 91Dvm9159 to (Evaluate:41Yzw5436)  Requested for: 21Apr2017; Last  Rx:21Apr2017 Ordered   11  DiazePAM 10 MG Oral Tablet; TAKE ONE TABLET 1/2 HOUR PRIOR TO PROCEDURE  IF  NEEDED, REPEAT ONE TABLET AT TIME OF PROCEDURE; Therapy: 22TWB1356 to (Evaluate:44Gxc9296)  Requested for: 55YHU1564; Last  Rx:21Vyb9957 Ordered   12  Ditropan 5 MG TABS; TAKE 1 TABLET 3 TIMES DAILY; Therapy: (Lizzy Nickel) to Recorded   13  Divalproex Sodium 500 MG Oral Tablet Delayed Release; TAKE 2 TABLET Every morning; Last KW:55ZOU3978 Ordered   14  Ferrous Sulfate 325 (65 Fe) MG Oral Tablet; one tablet twice a day; Therapy: (Lizzy Nickel) to Recorded   15  Hydrocortisone 1 % External Cream; APPLY SPARINGLY TO AFFECTED AREA(S) TWICE  DAILY; Therapy: 71UJF6690 to (Last Rx:02Jun2014)  Requested for: 02Jun2014 Ordered   16  Levothyroxine Sodium 25 MCG Oral Tablet; Take 1 tablet daily;   Therapy: 04VLT4242 to (Evaluate:18See4119)  Requested for: 53WKQ4230; Last  FR:27HSR9668 Ordered   17  LORazepam 0 5 MG Oral Tablet; Take 1 tablet twice daily; Therapy: 54WTJ8892 to (Evaluate:07Jun2014); Last LA:12RKE3164 Ordered   18  MetFORMIN HCl - 1000 MG Oral Tablet; TAKE 1 TABLET TWICE DAILY; Therapy: 38QOE9029 to (Evaluate:14Sep2014) Recorded   19  MiraLax Oral Packet; MIX 1 PACKET IN 8 OUNCES OF LIQUID AND DRINK ONCE DAILY; Therapy: () to Recorded   20  Neosporin AF 2 % CREA; APPLY SPARINGLY TO AFFECTED AREA(S) TWICE DAILY PRN; Therapy: () to Recorded   21  Nitrofurantoin Macrocrystal 50 MG Oral Capsule; TAKE 1 CAPSULE AT BEDTIME; Therapy: 11Apr2016 to (Evaluate:11May2016) Recorded   22  Omeprazole 20 MG Oral Tablet Delayed Release; take 1 tab po QD; Therapy: 08ZRA0618 to (Last Rx:21Jan2016) Ordered   23  Pataday 0 2 % Ophthalmic Solution; instill one drop into affected eyes daily PRN; Therapy: (Radha Garcia) to Recorded   24  Refresh SOLN; indtill 1 drop into affected eye(s) 3 times daily PRN eye irritation; Therapy: (aRdha Garcia) to Recorded   25  Simvastatin 20 MG Oral Tablet; TAKE 1 TABLET DAILY IN THE EVENING; Therapy: 75ZXQ3672 to (Aleksandra Judge)  Requested for: 42LPZ7738; Last  QO:22ZNQ7180 Ordered   26  TraZODone HCl - 100 MG Oral Tablet; TAKE 1 TABLET AT BEDTIME; Therapy: 92EZF2033 to (Evaluate:07Jun2014)  Requested for: 76YCL5520; Last  TS:98KPJ1625 Ordered   27  Tylenol 325 MG Oral Tablet; 2 tabs po Q 4 hours prn pain /temp >101; Therapy: 86Mrd2856 to Recorded   28  Vitamin D 2000 UNIT Oral Capsule; Take 1 tablet PO QD at 4 pm;  Therapy: 56RDK9154 to (Ghada Sotelo)  Requested for: 34VYH2688; Last  Rx:10Mar2016 Ordered  Medication List Reviewed: The medication list was reviewed and updated today  Allergies    1  No Known Drug Allergies    Vitals  Signs   Recorded: 62JCD8305 10:37AM   BP Comments: unable to obtain BP   Patient kept moving around  Weight: 117 lb   BMI Calculated: 24 45  BSA Calculated: 1 45    Physical Exam   Constitutional  General appearance: No acute distress, well appearing and well nourished  Musculoskeletal  Gait and station: Abnormal  -- spastic scissors gait  pt constantly leans to the right when walking standing or sitting  Needs walker to ambulate without falling  Muscle strength: Normal strength throughout  Muscle tone: Abnormal  -- Generalized spasticity; no cogwheeling  Involuntary movements: Abnormal involuntary movements were observed  -- Intermittent rapid resting tremor  Neurologic  Orientation to person, place, and time: Abnormal    Recent and remote memory: Abnormal    Attention span and concentration: Abnormal    Language: Abnormal  -- Patient has been more alert and communicative than she has been on the last 2 visits: Follows siple commands, can answer questions with one word  2nd cranial nerve: Normal    3rd, 4th, and 6th cranial nerves: Normal    7th cranial nerve: Normal    12th cranial nerve: Abnormal  -- dysarthic  Reflexes: Abnormal  -- Diffusely hyperreflexic  Right knee jerk stronger than left  Coordination: Abnormal    Cortical function: Abnormal    Judgment and insight: Abnormal    Mood and affect: Normal        Attending Note  Scribe Attestation:  Scribe Attestation Samy HARRIS am acting as a scribe in the presence of the attending physician while the attending physician examines the patient  Physician Attestation:  Claudell Barb personally performed the services described in this documentation as scribed in my presence, and it is both accurate and complete  Future Appointments    Date/Time Provider Specialty Site   11/30/2017 01:40 PM LOURDES Shell   [address] [place] [address]       Signatures   Electronically signed by : LOURDES Roldan ; Nov 15 2017  4:48PM EST                       (Author)

## 2017-11-30 ENCOUNTER — GENERIC CONVERSION - ENCOUNTER (OUTPATIENT)
Dept: OTHER | Facility: OTHER | Age: 57
End: 2017-11-30

## 2017-11-30 ENCOUNTER — ALLSCRIPTS OFFICE VISIT (OUTPATIENT)
Dept: OTHER | Facility: OTHER | Age: 57
End: 2017-11-30

## 2017-11-30 DIAGNOSIS — M81.0 AGE-RELATED OSTEOPOROSIS WITHOUT CURRENT PATHOLOGICAL FRACTURE: ICD-10-CM

## 2017-11-30 LAB — HBA1C MFR BLD HPLC: 6.4 %

## 2018-01-10 NOTE — PROCEDURES
Procedures by Александр Ellis MD  at 3/23/2016  2:01 PM      Author:  Александр Ellis MD Service:  Neurology Author Type:  Physician     Filed:  3/23/2016  2:03 PM Date of Service:  3/23/2016  2:01 PM Status:  Signed     :  Александр Ellis MD (Physician)             [address]  [place], [address]  Phone [phone]  Fax [phone]                   Patient Name: Marco Reese       Date performed:  3/23/2016 Medical Record #: [id number]   Report date: 3/23/16 File #: [id number]   Referring physician: LOURDES Aburto  ACCT#: -   : 1960 Study type: Routine, awake and asleep   Age: 50 y Technologist: Marcy FAITH EEG  T    Location: Outpatient ICD diagnosis: R56 9      -         ELECTROENCEPHALOGRAM    Patient History:  50year-old female with history of Intellectual Disability, bipolar disorder, impulse control disorder, diabetes, gait instability and diverticulosis  In 2015, she was having changes in behavior, i e  walking better some days than other, staff felt change in her mental status  She went through medical problems from November to March with bowel and UTI  Family doctor noticed staring during her  office visit in November and ordered a neurology consult  There were no comments on her order sheet from the neurologist       Medications:    Anti-epilepsy drugs: Valproic acid/Valproate (Depakote)  Other Medications: Ferrous Sulfate, ATivan, Desyrel, Zocor, Glucophage, Ditropan, Colace, Abilify, Miralax, Celexa, Synthroid, Fosamax, Prilosec  Sedation: -    Description of Procedure: The EEG was performed with electrodes applied using the International 10-20 System  Additional electrodes used included EOG, ECG and T1/T2 electrodes  The EEG was recorded entirely during wakefulness, from 9:14:55 AM  until 10:26:39 AM for  36 3 minutes  The recording was technically satisfactory       Skull Defect    -  Findings: Background Activity:   During wakefulness, the background consists of low amplitude, irregular, posterior predominant intermixed 5-6 and 2-3 Hz activity attenuated by eye opening  Activation Procedures:   Hyperventilation was performed with poor effort for 3-4 minutes did not produce any abnormalities  Stepped photic stimulation between 1-30 fps did not induce a photo convulsive response  Abnormal findings:   See Background Activity  No focal abnormalities or interictal epileptiform discharges were noted  No electrographic seizures were observed during this recording  Other findings: The single lead ECG shows regular sinus rhythm  Events: There are no patient push button events  Interpretation:   Markedly abnormal 36 minutes awake EEG, due to continuous low amplitude background intermixed theta and delta slowing  No focal abnormalities or interictal epileptiform discharges were noted  No electrographic seizures were observed during this  recording  Scribe Attestation  Documented by Samantha Vieira, acting as a scribe for Dr Chavo Bruno on 3/23/16 at 1:15 PM     Physician Attestation  All documentation recorded by the scribe accurately reflects the service I personally performed and the decisions made by me  I was present during the time the encounter was recorded and have reviewed all the documentation and confirm that it is all accurate  and representative of the encounter  Tea William MD  Medical Director  Jakob NEUMANN    Mar 23 2016  2:04PM [facility] Standard Time

## 2018-01-10 NOTE — RESULT NOTES
Verified Results  * DXA BONE DENSITY SPINE HIP AND PELVIS [id number] 02:19PM Nelson Mckeon     Test Name Result Flag Reference   DXA BONE DENSITY SPINE HIP AND PELVIS (Report)     CENTRAL DXA SCAN     CLINICAL HISTORY:  50year old post-menopausal  female with personal history of diabetes mellitus, hypothyroidism, epilepsy, left hip fracture after a fall and right rib fractures after fall  The patient does not exercise and does not take    calcium or vitamin D supplements  TECHNIQUE: Bone densitometry was performed using a Hologic Horizon A bone densitometer  Regions of interest appear properly placed  There are no obvious fractures or other confounding variables which could limit the study  COMPARISON: None  RESULTS:    LUMBAR SPINE: L1-L4:   BMD 0 898 gm/cm2   T-score -1 4   Z-score -0 4     RIGHT TOTAL HIP:   BMD 0 737 gm/cm2   T-score -1 7   Z-score -1 1     RIGHT FEMORAL NECK:   BMD 0 527 gm/cm2   T-score -2 9   Z-score -1 9           ASSESSMENT:   1  Based on the WHO classification, the T-score of -2 9 in the right femoral neck is consistent with OSTEOPOROSIS  2  According to the [address] [place] [address], prescription therapy is recommended with a T-score of -2 5 or less in the spine or hip after appropriate evaluation to exclude secondary causes  3  A daily intake of at least 1200 mg Calcium and 800 to 1000 IU of Vitamin D, as well as weight bearing and muscle strengthening exercise, fall prevention and avoidance of tobacco and excessive alcohol intake as basic preventive measures are    suggested  4  Repeat DXA scan in 18-24 months as clinically indicated         WHO CLASSIFICATION:   Normal (a T-score of -1 0 or higher)   Low bone mineral density (a T-score of less than -1 0 but higher than -2 5)   Osteoporosis (a T-score of -2 5 or less)   Severe osteoporosis (a T-score of -2 5 or less with a fragility fracture)       Plan  Osteoporosis    · Alendronate Sodium 70 MG Oral Tablet; TAKE ONE TABLET BY MOUTH WEEKLY

## 2018-01-12 VITALS
TEMPERATURE: 96.8 F | WEIGHT: 101.25 LBS | DIASTOLIC BLOOD PRESSURE: 60 MMHG | HEART RATE: 68 BPM | SYSTOLIC BLOOD PRESSURE: 92 MMHG | RESPIRATION RATE: 16 BRPM | HEIGHT: 58 IN | BODY MASS INDEX: 21.26 KG/M2

## 2018-01-13 VITALS
BODY MASS INDEX: 24.56 KG/M2 | TEMPERATURE: 99.1 F | RESPIRATION RATE: 16 BRPM | HEIGHT: 58 IN | WEIGHT: 117 LBS | HEART RATE: 76 BPM | DIASTOLIC BLOOD PRESSURE: 62 MMHG | SYSTOLIC BLOOD PRESSURE: 100 MMHG

## 2018-01-13 VITALS
SYSTOLIC BLOOD PRESSURE: 108 MMHG | HEART RATE: 80 BPM | DIASTOLIC BLOOD PRESSURE: 70 MMHG | WEIGHT: 108 LBS | BODY MASS INDEX: 22.67 KG/M2 | HEIGHT: 58 IN

## 2018-01-13 VITALS
SYSTOLIC BLOOD PRESSURE: 108 MMHG | HEIGHT: 58 IN | HEART RATE: 80 BPM | TEMPERATURE: 98.5 F | WEIGHT: 110.38 LBS | DIASTOLIC BLOOD PRESSURE: 70 MMHG | BODY MASS INDEX: 23.17 KG/M2 | RESPIRATION RATE: 18 BRPM

## 2018-01-13 VITALS
HEIGHT: 58 IN | DIASTOLIC BLOOD PRESSURE: 57 MMHG | SYSTOLIC BLOOD PRESSURE: 95 MMHG | WEIGHT: 116.5 LBS | BODY MASS INDEX: 24.45 KG/M2

## 2018-01-13 NOTE — MISCELLANEOUS
Assessment    1  Hospital discharge follow-up (V67 59) (Z09)   2  Dehydration (276 51) (E86 0)   3  Colostomy in place (V44 3) (Z93 3)    Plan  DMII (diabetes mellitus, type 2)    · *VB - Eye Exam; Status:Complete;   Done: 23IKT0284 12:00AM   DMV:66RMO8864;TXCXUEU; For:DMII (diabetes mellitus, type 2); Ordered By:Jung Mittal;  Health Maintenance    · COLONOSCOPY; Status:Complete;   Done: 38FJZ7977 12:00AM   Performed:*Other; ZZV:19XGB5967;NLQHWIR;  For:Health Maintenance; Ordered By:Jung Mittal;    Discussion/Summary  Discussion Summary:   Hospital follow-up secondary to dehydration: Patient is normotensive today  She is slightly tachycardic at a rate in the low 100s  This is a sinus rhythm  Her tongue is dry but her mucous membranes are moist  Most recent BMP 2 days ago showed normal sodium and chloride  She had normal renal function at that time  I advised her care nurse to continue encouraging fluids at the discretion and direction of the dietitian  Signs of hypovolemia and dehydration were discussed  A written prescription for correction of the Glucerna prescription was given  Patient will return in 2 weeks to see how she is doing  She is to resume all of her medications at previously prescribed dosages  Continue to see urology and nephrology as directed by those providers from the most recent hospital stay  No longer retaining urine  Colostomy: Continue seeing colorectal surgery as directed  History of Present Illness  TCM Communication [facility]: The patient is being contacted for follow-up after hospitalization and 2/26/16  Hospital records were reviewed  She was hospitalized at [facility]  The date of admission: 2/20/16, date of discharge: 2/24/16  Diagnosis: dehydration hyperkalemia  She was discharged to home, personal care facility  Medications reviewed and updated today  Counseling was provided to patient's caretaker   check fasting glucose again am 2/26 monitor bp pt on lisinopril due to dm    Communication performed and completed by   HPI: Patient was hospitalized one month ago for urinary tract infection  They did do a KUB of her abdomen which showed free air  Subsequently she was diagnosed with a sigmoid volvulus/colonic obstruction and ultimately she had surgery secondary to dead bowel wall  Her hospital stay at that time was complicated by respiratory arrest in the PACU  She went back to the hospital with dehydration most recently one week ago  She had urinary obstruction and retention in the second hospital stay  She ultimately was sent home without an indwelling Kenny  She follows with Dr Jacinda Ji for this  She is currently spontaneously voiding  She had increased blood sugars, but her medications have just been restarted as she was not taking them in the hospital  Most recent BMP is within normal limits except for a glucose of 203  This was done 2 days ago  She does have iron deficiency anemia for which she is on iron  Dietitian is asking us to write orders for a new diet  Voiding without difficulty  Will see Dr Jacinda Ji in 3 weeks  Sees Nephro and CRS next week  Colostomy doing well  First hospitalization: 1/31-2/17  Second hospitalization: 2/20-2/24      Review of Systems  Complete-Female:   Constitutional: no fever and no chills  Respiratory: no shortness of breath, no cough, no wheezing and no shortness of breath during exertion  Gastrointestinal: no vomiting and no diarrhea  Genitourinary: as noted in HPI  Active Problems    1  Abnormal blood chemistry (790 6) (R79 9)   2  Abrasion (919 0) (T14 8)   3  Acute conjunctivitis (372 00) (H10 30)   4  Allergic rhinitis due to pollen (477 0) (J30 1)   5  Anxiety (300 00) (F41 9)   6  Atopic dermatitis (691 8) (L20 9)   7  Bipolar disorder (296 80) (F31 9)   8  Change in mental status (780 97) (R41 82)   9  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)   10   Closed fracture of left hip, sequela (905 3) (S72 002S)   11  Constipation (564 00) (K59 00)   12  Difficulty walking (719 7) (R26 2)   13  Diverticulosis (562 10) (K57 90)   14  DMII (diabetes mellitus, type 2) (250 00) (E11 9)   15  Dry eye (375 15) (H04 129)   16  Encounter for routine gynecological examination (V72 31) (Z01 419)   17  Encounter for staple removal (V58 32) (Z48 02)   18  Fatigue (780 79) (R53 83)   19  Gait disturbance (781 2) (R26 9)   20  Gastroesophageal reflux disease without esophagitis (530 81) (K21 9)   21  Herpes labialis (054 9) (B00 1)   22  Hip fracture, left (820 8) (S72 002A)   23  Hyperlipidemia (272 4) (E78 5)   24  Hypothyroidism (244 9) (E03 9)   25  Intellectual disability (319) (F79)   26  Laceration of head (873 8) (S01 91XA)   27  Lump Or Swelling In The Neck - Left Side (784 2)   28  Menopause (627 2) (Z78 0)   29  Microalbuminuria (791 0) (R80 9)   30  Need for 23-polyvalent pneumococcal polysaccharide vaccine (V03 82) (Z23)   31  Need for prophylactic vaccination and inoculation against influenza (V04 81) (Z23)   32  Osteoporosis (733 00) (M81 0)   33  Other chronic pain (338 29) (G89 29)   34  Postmenopausal bleeding (627 1) (N95 0)   35  Resting tremor (781 0) (R25 9)   36  Skin irritation (709 9) (R23 8)   37  Staring spell (780 02) (R40 4)   38  Status post fall (V15 88) (Z91 89)   39  Symptoms involving urinary system (788 99) (R39 9)   40  Thrombocytopenia (287 5) (D69 6)   41  Urge and stress incontinence (788 33) (N39 46)   42  Urinary tract infection (599 0) (N39 0)   43  Weight loss, non-intentional (783 21) (R63 4)    Past Medical History    1  History of Abnormal gait (781 2) (R26 9)   2  History of Adjustment disorder (309 9) (F43 20)   3  History of Chronic constipation (564 00) (K59 00)   4  History of anemia (V12 3) (Z86 2)   5  History of bipolar disorder (V11 1) (Z86 59)   6  History of hyperlipidemia (V12 29) (Z86 39)   7  History of mental retardation (V11 8) (Z86 59)   8   History of urinary incontinence (V13 09) (Z87 898)   9  History of Impulse control disorder (312 30) (F63 9)   10  History of Microalbuminuria (791 0) (R80 9)   11  Need for prophylactic vaccination and inoculation against influenza (V04 81) (Z23)    Family History    1  No pertinent family history    Social History    · Never A Smoker    Current Meds   1  Abilify 30 MG Oral Tablet; TAKE 1 TABLET DAILY; Therapy: 80CTH6613 to (Evaluate:07Jun2014)  Requested for: 58DMM1623; Last   ON:67NBM6744 Ordered   2  Abreva 10 % External Cream; APPLY AND RUB IN 5 TIMES DAILY UNTIL HEALED PRN   lesion; Therapy: 05JNB2261 to (Last Rx:02Jun2014)  Requested for: 02Jun2014 Ordered   3  Alendronate Sodium 70 MG Oral Tablet; TAKE ONE TABLET BY MOUTH WEEKLY; Therapy: 82JAZ0456 to (Evaluate:73Glg8632)  Requested for: 21Jan2016; Last   Rx:21Jan2016 Ordered   4  Citalopram Hydrobromide 40 MG Oral Tablet; Take 1 tablet daily; Therapy: 38QRM8100 to (Lianne Cruz)  Requested for: 43FFY7192; Last   RE:43UFQ7842 Ordered   5  Colace 100 MG Oral Capsule; TAKE 1 CAPSULE TWICE DAILY; Therapy: 12Apr2013 to (Evaluate:07Apr2014); Last Rx:12Apr2013 Ordered   6  Divalproex Sodium 500 MG Oral Tablet Delayed Release; TAKE 2 TABLET Every morning; Last TQ:79DCP9817 Ordered   7  Ferrous Sulfate 325 (65 Fe) MG Oral Tablet; Take 1 tablet twice a day; Therapy: 27Qbh8010 to (Evaluate:08Nov2013); Last Rx:12Apr2013 Ordered   8  Fexofenadine HCl - 180 MG Oral Tablet; TAKE 1 TABLET DAILY AS NEEDED FOR   ALLERGIES; Therapy: 85SEG9019 to (Evaluate:04Nov2014); Last YQ:64FVF5108 Ordered   9  Hydrocortisone 1 % External Cream; APPLY SPARINGLY TO AFFECTED AREA(S) TWICE   DAILY; Therapy: 87NUZ9965 to (Last Rx:02Jun2014)  Requested for: 02Jun2014 Ordered   10  Levothyroxine Sodium 25 MCG Oral Tablet; Take 1 tablet daily; Therapy: 73RWF2208 to (Evaluate:75Khs2799)  Requested for: 04TRQ8408; Last    PW:94YOX2987 Ordered   11   Lisinopril 2 5 MG Oral Tablet; TAKE 1 TABLET DAILY; Therapy: 47Cgm9990 to (Evaluate:07Jun2014)  Requested for: 76OMP9075; Last    LX:08SGJ8369 Ordered   12  LORazepam 0 5 MG Oral Tablet; Take 1 tablet twice daily; Therapy: 87QZC0107 to (Evaluate:07Jun2014); Last FW:58YUK7340 Ordered   13  MetFORMIN HCl - 1000 MG Oral Tablet; TAKE 1 TABLET TWICE DAILY; Therapy: 41GSB4324 to (Evaluate:14Sep2014) Recorded   14  MiraLax Oral Packet; MIX 1 PACKET IN 8 OUNCES OF LIQUID AND DRINK ONCE DAILY; Therapy: () to Recorded   15  Neosporin AF 2 % External Cream; APPLY SPARINGLY TO AFFECTED AREA(S) TWICE    DAILY PRN; Therapy: () to Recorded   16  Omeprazole 20 MG Oral Tablet Delayed Release; take 1 tab po QD; Therapy: 77PGB2234 to (Last Rx:21Jan2016) Ordered   17  Oxybutynin Chloride ER 5 MG Oral Tablet Extended Release 24 Hour; TAKE 1 TABLET 3    times daily; Last OJ:81YVA6472 Ordered   18  Pataday 0 2 % Ophthalmic Solution; INSTILL 1 DROP Daily PRN For eye redness; Therapy: 59YTN0238 to (Last PRATT:04WAI3358)  Requested for: 31CBG9452 Ordered   19  Refresh 1 4-0 6 % Ophthalmic Solution; INSTILL 1 DROP INTO AFFECTED EYE(S) 3    TIMES DAILY PRN eye irritation; Therapy: 80OIO5454 to (Last Rx:02Jun2014)  Requested for: 02Jun2014 Ordered   20  Simvastatin 20 MG Oral Tablet; TAKE 1 TABLET DAILY IN THE EVENING; Therapy: 00IJZ1801 to (Leonardo Sharper)  Requested for: 45DUM1809; Last    SE:81LCE2656 Ordered   21  TraZODone HCl - 100 MG Oral Tablet; TAKE 1 TABLET AT BEDTIME; Therapy: 46LYY2146 to (Evaluate:07Jun2014)  Requested for: 49KVE3036; Last    SA:01WFF0529 Ordered   22  Tylenol 325 MG Oral Tablet; 2 tabs po Q 4 hours prn pain /temp >101; Therapy: 40Ydc4123 to Recorded    Allergies    1   No Known Drug Allergies    Vitals  Signs [Data Includes: Current Encounter]   Recorded: 06Meh8096 10:50AM   Temperature: 96 3 F  Heart Rate: 70  Respiration: 12  Systolic: 256  Diastolic: 80  Height: 4 ft 10 in  Weight: 88 lb 6 08 oz  BMI Calculated: 18 47  BSA Calculated: 1 28  Pain Scale: 0    Physical Exam    Constitutional   General appearance: No acute distress, well appearing and well nourished  Eyes   Conjunctiva and lids: No swelling, erythema or discharge  Pupils and irises: Equal, round and reactive to light  Ears, Nose, Mouth, and Throat   External inspection of ears and nose: Normal     Oropharynx: Abnormal   Moist mucous membranes with dry tongue and lips  Pulmonary   Respiratory effort: No increased work of breathing or signs of respiratory distress  Auscultation of lungs: Clear to auscultation  Cardiovascular   Auscultation of heart: Normal rate and rhythm, normal S1 and S2, without murmurs  Examination of extremities for edema and/or varicosities: Normal     Abdomen   Abdomen: Non-tender, no masses  Musculoskeletal   Gait and station: Abnormal   wheelchair bound  Skin   Skin and subcutaneous tissue: Normal without rashes or lesions  Appropriate skin turgor  Psychiatric   Orientation to person, place, and time: Normal     Mood and affect: Normal          Results/Data  Encounter Results   [place] [facility] - Eye Exam 84BVN4449 12:00AM Rosalie Hoar     Test Name Result Flag Reference   Eye Exam No retinopathy       COLONOSCOPY 39VGF8005 12:00AM Rosalie Hoar     Test Name Result Flag Reference   Colonoscopy  A    Diverticulosisfollow-up 5 years       Attending Note  Attending Note: Attending Note: I discussed the case with the Resident and reviewed the Resident's note  Level of Participation: I was present in clinic, but did not examine the patient  I agree with the Resident's note  I agree with the Resident's note except Dehydration, improved  Message   Recorded as Task   Date: 02/25/2016 06:31 AM, Created By: System   Task Name: Hospital [facility]YSSA   Assigned To: [facility] triage,Team   Regarding Patient: Lisa Diaz, Status:  In Progress   Comment:    System - 25 Feb 2016 6:31 AM Patient discharged from hospital   Patient Name: Kelli Mahan  Patient YOB: 1960  Discharge Date: 02/24/2016  Facility: [facility] - 25 Feb 2016 8:01 AM     [facility]   Naj[facility] Vale - 25 Feb 2016 8:01 AM     TASK REASSIGNED: Previously Assigned To Zacarias Hodgkin - 25 Feb 2016 3:19 PM     TASK REASSIGNED: [facility]  ELYSSA call complete Dr Johnny Chavez will see pt tomorrow  for fu visit   Emeli Murillo - 25 Feb 2016 4:20 PM     TASK IN PROGRESS     Future Appointments    Date/Time Provider Specialty Site   04/05/2016 11:00 AM LOURDES Goldman  Neurology [facility]UROLOGY [facility]   03/10/2016 08:30 AM Daja Zhu DO Family Medicine [facility] FAMILY PRATICE   04/11/2016 09:50 AM Daja Zhu DO Family [facility] [address] 52     Signatures   Electronically signed by :  Faith Oakley, ; Feb 28 2016  9:49AM EST                       (Author)    Electronically signed by : LOURDES Hi ; Mar  2 2016 11:48AM EST                       (Review)

## 2018-01-13 NOTE — MISCELLANEOUS
Provider Comments  Provider Comments:   PT NO SHOW FOR APPT      Signatures   Electronically signed by :  Medardo Dale, ; Feb 22 2016  2:26PM EST                       (Author)    Electronically signed by : Jomar Lindsay, ; Feb 22 2016  4:50PM EST                       (Author)    Electronically signed by : LOURDES Rodas ; Feb 29 2016  4:45PM EST                       (Author)

## 2018-01-13 NOTE — RESULT NOTES
Verified Results  *(Q) VITAMIN D, 25-HYDROXY, LC/MS/MS 06Apr2016 07:04AM Bertrand Bruno   REPORT COMMENT:  FASTING:YES     Test Name Result Flag Reference   VITAMIN D, 25-OH, TOTAL 53 ng/mL     Vitamin D Status         25-OH Vitamin D:     Deficiency:                    <20 ng/mL  Insufficiency:             20 - 29 ng/mL  Optimal:                 > or = 30 ng/mL     For 25-OH Vitamin D testing on patients on   D2-supplementation and patients for whom quantitation   of D2 and D3 fractions is required, the QuestAssureD(TM)  25-OH VIT D, (D2,D3), LC/MS/MS is recommended: order   code 44640 (patients >2yrs)  For more information on this test, go to:  http://[employer]/faq/OPB507  (This link is being provided for   informational/educational purposes only )     (Q) HEMOGLOBIN A1c 06Apr2016 07:04AM Bertrand rBuno   REPORT COMMENT:  FASTING:YES     Test Name Result Flag Reference   HEMOGLOBIN A1c 5 4 % of total Hgb  <5 7   According to ADA guidelines, hemoglobin A1c <7 0%  represents optimal control in non-pregnant diabetic  patients  Different metrics may apply to specific  patient populations  Standards of Medical Care in    Diabetes Care  2013;36:s11-s66     For the purpose of screening for the presence of  diabetes  <5 7%       Consistent with the absence of diabetes  5 7-6 4%    Consistent with increased risk for diabetes              (prediabetes)  >or=6 5%    Consistent with diabetes     This assay result is consistent with a decreased risk  of diabetes  Currently, no consensus exists for use of hemoglobin  A1c for diagnosis of diabetes for children       (Q) TSH, 3RD GENERATION W/REFLEX TO FT4 06Apr2016 07:04AM Bertrand Bruno   REPORT COMMENT:  FASTING:YES     Test Name Result Flag Reference   TSH W/REFLEX TO FT4 3 94 mIU/L     Reference Range                         > or = 20 Years  0 40-4 50                              Pregnancy Ranges            First trimester 0  26-2 66            Second trimester   0 55-2 73            Third trimester    0 43-2 91     (1) COMPREHENSIVE METABOLIC PANEL 15ZJS7365 56:53UE Nanda Lei     Test Name Result Flag Reference   GLUCOSE 96 mg/dL  65-99   Fasting reference interval   UREA NITROGEN (BUN) 21 mg/dL  7-25   CREATININE 0 72 mg/dL  0 50-1 05   For patients >52years of age, the reference limit  for Creatinine is approximately 13% higher for people  identified as -American  eGFR NON-AFR   AMERICAN 96 mL/min/1 73m2  > OR = 60   eGFR AFRICAN AMERICAN 111 mL/min/1 73m2  > OR = 60   BUN/CREATININE RATIO   3-84   NOT APPLICABLE (calc)   SODIUM 140 mmol/L  135-146   POTASSIUM 4 4 mmol/L  3 5-5 3   CHLORIDE 101 mmol/L     CARBON DIOXIDE 27 mmol/L  19-30   CALCIUM 9 6 mg/dL  8 6-10 4   PROTEIN, TOTAL 7 0 g/dL  6 1-8 1   ALBUMIN 3 7 g/dL  3 6-5 1   GLOBULIN 3 3 g/dL (calc)  1 9-3 7   ALBUMIN/GLOBULIN RATIO 1 1 (calc)  1 0-2 5   BILIRUBIN, TOTAL 0 1 mg/dL L 0 2-1 2   ALKALINE PHOSPHATASE 42 U/L     AST 15 U/L  10-35   ALT 9 U/L  6-29     (1) CK (CPK) 06Apr2016 07:04AM Nanda Lei   REPORT COMMENT:  FASTING:YES     Test Name Result Flag Reference   CREATINE KINASE, TOTAL 42 U/L

## 2018-01-13 NOTE — RESULT NOTES
Verified Results  * CT SPINE LUMBAR WO CONTRAST [id number] 02:10PM Rosalba Ask Order Number: [id number]     Test Name Result Flag Reference   CT SPINE LUMBAR [address] (Report)     CT LUMBAR SPINE     INDICATION: Difficulty with gait since December 2015  COMPARISON: 2/20/2016 7/26/2015; pelvic radiograph 8/26/2016     TECHNIQUE: Contiguous axial images through the lumbar spine were obtained  Sagittal and coronal reconstructions were performed  This examination, like all CT scans performed in the [facility], was performed utilizing techniques to    minimize radiation dose exposure, including the use of iterative reconstruction and automated exposure control  IMAGE QUALITY: Diagnostic  FINDINGS:     ALIGNMENT: Slight levoscoliosis mid lumbar spine  No subluxation  VERTEBRAL BODIES: Minimal degenerative endplate changes  No fracture  Schmorl's nodes noted inferior endplate of L1  Sclerosis in the anterior margin of T12 may represent a bone island  There is also sclerosis in the posterior margin of the L1    vertebra adjacent to the region of the epidural venous plexus, stable, nonspecific, unchanged from 7/26/2015     DEGENERATIVE CHANGES:     Lower Thoracic spine: Normal lower thoracic disc spaces ]     L1-2: Normal disc height  No herniation  Normal facet joints  No canal or foraminal stenosis  L2-3: Normal disc height  No herniation  Normal facet joints  No canal or foraminal stenosis  L3-4: Normal disc height  No herniation  Normal facet joints  No canal or foraminal stenosis  L4-5: There is a diffuse disk bulge  No significant central canal or neural foraminal narrowing  L5-S1: There is a diffuse disk bulge  No significant central canal or neural foraminal narrowing  Bilateral facet hypertrophy noted  PARASPINAL SOFT TISSUES: Minimal atherosclerotic vascular calcifications noted        localizer view demonstrates deformity of the left femoral neck  IMPRESSION:       1  No acute fracture or subluxation  Minimal lumbar spondylosis  2   localizer view demonstrates deformity of the left hip, similar to the previous 1/20/2016 CT, possibly related to prior fracture and/or contracture  Dedicated left hip radiographs may be of additional use       Workstation performed: [id number]     Signed by:    Bairon Álvarez MD   3/24/16

## 2018-01-13 NOTE — RESULT NOTES
Verified Results  (1) VITAMIN B12 21Jan2016 12:27PM [facility]: [id number]     Test Name Result Flag Reference   VITAMIN B12 1038 pg/mL H 100-900     (1) FOLATE 21Jan2016 12:27PM [facility] Order Number: [id number]     Test Name Result Flag Reference   FOLATE 16 6 ng/mL  3 1-17 5

## 2018-01-14 VITALS
SYSTOLIC BLOOD PRESSURE: 100 MMHG | HEIGHT: 58 IN | DIASTOLIC BLOOD PRESSURE: 64 MMHG | RESPIRATION RATE: 12 BRPM | HEART RATE: 78 BPM | WEIGHT: 105.25 LBS | TEMPERATURE: 94.5 F | BODY MASS INDEX: 22.09 KG/M2

## 2018-01-14 VITALS
HEART RATE: 63 BPM | BODY MASS INDEX: 21.78 KG/M2 | SYSTOLIC BLOOD PRESSURE: 120 MMHG | WEIGHT: 104.19 LBS | DIASTOLIC BLOOD PRESSURE: 62 MMHG | OXYGEN SATURATION: 80 %

## 2018-01-14 VITALS — BODY MASS INDEX: 24.45 KG/M2 | WEIGHT: 117 LBS

## 2018-01-15 VITALS
HEIGHT: 58 IN | BODY MASS INDEX: 21.83 KG/M2 | SYSTOLIC BLOOD PRESSURE: 106 MMHG | DIASTOLIC BLOOD PRESSURE: 70 MMHG | HEART RATE: 68 BPM | RESPIRATION RATE: 14 BRPM | WEIGHT: 104 LBS | TEMPERATURE: 97.4 F

## 2018-01-15 NOTE — MISCELLANEOUS
Given improvement of albumin to 3 1 the patient will have a decrease in boost pudding to 1 time daily rather than 3 times daily  Thank you,    Javi NEMUANN      Electronically signed Vicki NEUMANN    Nov 30 2017  2:17PM EST

## 2018-01-15 NOTE — MISCELLANEOUS
Assessment    1  Pneumonia (486) (J18 9)   2  Hospital discharge follow-up (V67 59) (Z09)   3  Dehydration (276 51) (E86 0)   4  Chronic UTI (599 0) (N39 0)    Plan  Pneumonia    · * XR CHEST PA & LATERAL; Status:Active; Requested IJO:85MPD3162;    Perform:[facility]; WTE:06BTX3020; Ordered; For:Pneumonia; Ordered By:Beni Cisneros;    Discussion/Summary  Discussion Summary:   48 yo female ELYSSA visit for:  1)pneumonia: completed antibiotic course, clinically improved  Check CXR for resolution first week in May  Monitor closely  Precautions for RTO/ED discussed  2) dehydration: resolved  continue fluid goals, monitor  3) chronic UTI/colonization: continue macrodantin  4) deconditioning; continue PT, if need extension of PT after initial period ends, notify us  Follow up as planned for routine care  Counseling Documentation With Imm: The was counseled regarding instructions for management, impressions, discussed with Lisa  Medication SE Review and Pt Understands Tx: The treatment plan was reviewed with the patient/guardian  The patient/guardian understands and agrees with the treatment plan      Chief Complaint  Chief Complaint Free Text Note Form: Here with [facility] care provider for [facility] visit      History of Present Illness  TCM Communication [facility]: The patient is being contacted for follow-up after hospitalization  Hospital records were reviewed  She was hospitalized at [facility]  The date of admission: 03/27/2017, date of discharge: 03/29/2017  Diagnosis: Healthcare associated pneumonia, Dehydration  Hypertension, GERD, Cerebral Palsy  She was discharged to home  Medications reviewed and updated today  She scheduled a follow up appointment  The patient is currently asymptomatic  Counseling was provided to patient's caretaker  Topics counseled included instructions for management and activities of daily living     Communication performed and completed by Suleman Shultz LPN   HPI: Patient resides in group home  Spoke with primary nurse, Kymberly Reece  Since discharge patient doing well, she is eating well but not drinking enough  She has had no fever, cough or wheezing  She does not appear to be in any distress  She was discharged with Doxycycline 100mg BID for 6 days, tolerating antibiotic without any side effects  Her Macrodantin is being held while on the Doxycycline  4/5/2017: Here for ELYSSA visit s/p hospitalization for pneumonia and dehydration  SInce d/c Kymberly Reece reports that she has remained afebrile and asymptomatic for pneumonia/UTI and is back to baseline neurologically/behavior-wise, but seems to fatigue quicker  She has completed doxycycline and will resume macrodantin tonight  She is mostly meeting her fluid intake goal of 40 oz fluid daily  She it taking the BOOST pudding with meds  She began PT in home yesterday and PT is to come twice weekly  She has been severely deconditioned in the past year  Denies coughing, SOB, abdominal/suprapubic pain  Review of Systems  Complete-Female:   Constitutional: feeling tired, but no fever, not feeling poorly and no chills  Eyes: no purulent discharge from the eyes  ENT: no nasal discharge  Respiratory: no shortness of breath and no cough  Gastrointestinal: constipation and chronic constipation, on miralax, but no abdominal pain, no vomiting and no diarrhea  Genitourinary: adequate urine output on toileting routine q 2hours, but no dysuria  Integumentary: no rashes  Psychiatric: no personality change  Hematologic/Lymphatic: no swollen glands  Active Problems    1  Abnormal blood chemistry (790 6) (R79 9)   2  Abrasion (919 0) (T14 8)   3  Absolute anemia (285 9) (D64 9)   4  Acute conjunctivitis (372 00) (H10 30)   5  Allergic rhinitis due to pollen (477 0) (J30 1)   6  Altered mental status, unspecified (780 97) (R41 82)   7  Ambulatory dysfunction (719 7) (R26 2)   8  Anxiety (300 00) (F41 9)   9   Atopic dermatitis (691 8) (L20 9)   10  Bipolar disorder (296 80) (F31 9)   11  Cancer screening (V76 9) (Z12 9)   12  Cerebral palsy (343 9) (G80 9)   13  Cervical cancer screening (V76 2) (Z12 4)   14  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)   15  Chronic hypernatremia (276 0) (E87 0)   16  Closed fracture of left hip, sequela (905 3) (S72 002S)   17  Colostomy in place (V44 3) (Z93 3)   18  Constipation (564 00) (K59 00)   19  Dehydration (276 51) (E86 0)   20  Difficulty walking (719 7) (R26 2)   21  Diverticulosis (562 10) (K57 90)   22  DMII (diabetes mellitus, type 2) (250 00) (E11 9)   23  Dry eye (375 15) (H04 129)   24  Dysuria (788 1) (R30 0)   25  Encounter for routine gynecological examination (V72 31) (Z01 419)   26  Encounter for staple removal (V58 32) (Z48 02)   27  Fatigue (780 79) (R53 83)   28  Frequent UTI (599 0) (N39 0)   29  Gait disturbance (781 2) (R26 9)   30  Gastroesophageal reflux disease without esophagitis (530 81) (K21 9)   31  Herpes labialis (054 9) (B00 1)   32  Hip fracture, left (820 8) (S72 002A)   33  Hospital discharge follow-up (V67 59) (Z09)   34  Hyperkalemia (276 7) (E87 5)   35  Hyperlipidemia (272 4) (E78 5)   36  Hypotension (458 9) (I95 9)   37  Hypothyroidism (244 9) (E03 9)   38  Influenza vaccine needed (V04 81) (Z23)   39  Intellectual disability (319) (F79)   40  Intellectual disability (319) (F79)   41  Laceration of head (873 8) (S01 91XA)   42  Lump Or Swelling In The Neck - Left Side (784 2)   43  Menopause (627 2) (Z78 0)   44  Microalbuminuria (791 0) (R80 9)   45  Need for 23-polyvalent pneumococcal polysaccharide vaccine (V03 82) (Z23)   46  Need for prophylactic vaccination and inoculation against influenza (V04 81) (Z23)   47  Osteoporosis (733 00) (M81 0)   48  Other chronic pain (338 29) (G89 29)   49  Postmenopausal bleeding (627 1) (N95 0)   50  Resting tremor (781 0) (R25 9)   51  Skin irritation (709 9) (R23 8)   52   Staring spell   53  Status post fall (V15 88) (Z91 81)   54  Symptoms involving urinary system (788 99) (R39 9)   55  Thrombocytopenia (287 5) (D69 6)   56  Tremor (781 0) (R25 1)   57  Tuberculosis screening (V74 1) (Z11 1)   58  Urge and stress incontinence (788 33) (N39 46)   59  Urinary tract infection (599 0) (N39 0)   60  Weight loss, non-intentional (783 21) (R63 4)    Past Medical History    1  History of Abnormal gait (781 2) (R26 9)   2  History of Adjustment disorder (309 9) (F43 20)   3  History of Chronic constipation (564 00) (K59 09)   4  History of bipolar disorder (V11 1) (Z86 59)   5  History of hyperlipidemia (V12 29) (Z86 39)   6  History of urinary incontinence (V13 09) (Z87 898)   7  History of Impulse control disorder (312 30) (F63 9)   8  History of Microalbuminuria (791 0) (R80 9)   9  Need for prophylactic vaccination and inoculation against influenza (V04 81) (Z23)    Surgical History    1  History of Ileostomy   2  History of Partial Colectomy - Sigmoid  Surgical History Reviewed: The surgical history was reviewed and updated today  Family History  Mother    1  No pertinent family history  Family History Reviewed: The family history was reviewed and updated today  Social History    · Lives in group home (V60 6) (Z59 3)   · Never A Smoker  Social History Reviewed: The social history was reviewed and updated today  Current Meds   1  Abilify 30 MG Oral Tablet; TAKE 1 TABLET DAILY; Therapy: 54ZNF0284 to (Evaluate:07Jun2014)  Requested for: 08YFF3895; Last   OS:68IVH7866 Ordered   2  Abreva 10 % External Cream; APPLY AND RUB IN 5 TIMES DAILY UNTIL HEALED PRN   lesion; Therapy: 24NGY9919 to (Last Rx:02Jun2014)  Requested for: 02Jun2014 Ordered   3  Alendronate Sodium 70 MG Oral Tablet; TAKE ONE TABLET BY MOUTH WEEKLY; Therapy: 14ALT3191 to (Evaluate:52Vvr9546)  Requested for: 21Jan2016; Last   Rx:21Jan2016 Ordered   4  Boost Pudding PUDG;  Take 1 container 3 times daily with medication passes   (7a-4p-8-p); Therapy: (Recorded:05Apr2017) to Recorded   5  Calcium Carbonate 1250 (500 Ca) MG Oral Tablet; TAKE 1 TABLET Daily at 4 PM;   Therapy: 40CRU5893 to (Evaluate:08Jun2016)  Requested for: 49CXU4616; Last   Rx:10Mar2016 Ordered   6  Citalopram Hydrobromide 40 MG Oral Tablet; Take 1 tablet daily; Therapy: 53DBW4667 to (Eri Watson)  Requested for: 50LFJ3676; Last   ZJ:89PVT1696 Ordered   7  Colace 100 MG Oral Capsule; TAKE 1 CAPSULE TWICE DAILY; Therapy: 98Pvj2122 to (Evaluate:07Apr2014); Last Rx:12Apr2013 Ordered   8  DiazePAM 10 MG Oral Tablet; TAKE ONE TABLET 1/2 HOUR PRIOR TO PROCEDURE  IF   NEEDED, REPEAT ONE TABLET AT TIME OF PROCEDURE; Therapy: 02CAF8598 to (Evaluate:11May2016)  Requested for: 42SHU2291; Last   Rx:10May2016 Ordered   9  Divalproex Sodium 500 MG Oral Tablet Delayed Release; TAKE 2 TABLET Every morning; Last ZJ:26ROF8765 Ordered   10  Ferrous Sulfate 325 (65 Fe) MG Oral Tablet; Take 1 tablet twice a day; Therapy: 32Cqq3756 to (Evaluate:08Nov2013); Last Rx:12Apr2013 Ordered   11  Fexofenadine HCl - 180 MG Oral Tablet; TAKE 1 TABLET DAILY AS NEEDED FOR    ALLERGIES; Therapy: 46VBQ3413 to (Evaluate:04Nov2014); Last VX:60ZBD2520 Ordered   12  Hydrocortisone 1 % External Cream; APPLY SPARINGLY TO AFFECTED AREA(S) TWICE    DAILY; Therapy: 58TFI4785 to (Last Rx:02Jun2014)  Requested for: 02Jun2014 Ordered   13  Levothyroxine Sodium 25 MCG Oral Tablet; Take 1 tablet daily; Therapy: 62AHG7469 to (Evaluate:28Pks9668)  Requested for: 25PXP6499; Last    FT:86DQG8123 Ordered   14  LORazepam 0 5 MG Oral Tablet; Take 1 tablet twice daily; Therapy: 42RWO4878 to (Evaluate:07Jun2014); Last PQ:43MWS7107 Ordered   15  MetFORMIN HCl - 1000 MG Oral Tablet; TAKE 1 TABLET TWICE DAILY; Therapy: 70KLW8249 to (Evaluate:64Ypb1395) Recorded   16  MiraLax Oral Packet; MIX 1 PACKET IN 8 OUNCES OF LIQUID AND DRINK ONCE DAILY;     Therapy: (Recorded:09Nov2015) to Recorded   17  Neosporin AF 2 % CREA; APPLY SPARINGLY TO AFFECTED AREA(S) TWICE DAILY PRN; Therapy: ([phone]) to Recorded   18  Nitrofurantoin Macrocrystal 50 MG Oral Capsule; TAKE 1 CAPSULE AT BEDTIME; Therapy: 74Czu3044 to (Evaluate:11May2016) Recorded   19  Omeprazole 20 MG Oral Tablet Delayed Release; take 1 tab po QD; Therapy: 21Jan2016 to (Last Rx:21Jan2016) Ordered   20  Oxybutynin Chloride ER 5 MG Oral Tablet Extended Release 24 Hour; TAKE 1 TABLET 3    times daily; Last RP:45OVN8637 Ordered   21  Pataday 0 2 % Ophthalmic Solution; INSTILL 1 DROP Daily PRN For eye redness; Therapy: 88UXS7052 to (Last MA:10QEN5063)  Requested for: 40SQE4759 Ordered   22  Refresh 1 4-0 6 % Ophthalmic Solution; INSTILL 1 DROP INTO AFFECTED EYE(S) 3    TIMES DAILY PRN eye irritation; Therapy: 66UTL9852 to (Last Rx:02Jun2014)  Requested for: 02Jun2014 Ordered   23  Simvastatin 20 MG Oral Tablet; TAKE 1 TABLET DAILY IN THE EVENING; Therapy: 69KWO8007 to (Neville Cola)  Requested for: 82GJK8512; Last    KADY:13JOW9418 Ordered   24  TraZODone HCl - 100 MG Oral Tablet; TAKE 1 TABLET AT BEDTIME; Therapy: 50PQT8473 to (Evaluate:07Jun2014)  Requested for: 69TDQ9809; Last    VL:74OYX0394 Ordered   25  Tylenol 325 MG Oral Tablet; 2 tabs po Q 4 hours prn pain /temp >101; Therapy: 84Rjj9688 to Recorded   26  Vitamin D 2000 UNIT Oral Capsule; Take 1 tablet PO QD at 4 pm;    Therapy: 92LAE9291 to (Noe Cortes)  Requested for: 97VUW5706; Last    Rx:10Mar2016 Ordered  Medication List Reviewed: The medication list was reviewed and updated today  Allergies    1   No Known Drug Allergies    Vitals  Signs   Recorded: 46KJG1516 09:51AM   Temperature: 95 7 F  Heart Rate: 56  Respiration: 16  Systolic: 018  Diastolic: 64  Height: 4 ft 10 in  Weight: 103 lb 6 oz  BMI Calculated: 21 61  BSA Calculated: 1 38    Physical Exam    Constitutional   General appearance: No acute distress, well appearing and well nourished  sitting in wheel chair  Eyes   Conjunctiva and lids: No swelling, erythema or discharge  Pupils and irises: Equal, round and reactive to light  Ears, Nose, Mouth, and Throat   External inspection of ears and nose: Normal     Nasal mucosa, septum, and turbinates: Normal without edema or erythema  Oropharynx: Normal with no erythema, edema, exudate or lesions  Pulmonary   Respiratory effort: No increased work of breathing or signs of respiratory distress  Auscultation of lungs: Clear to auscultation  Cardiovascular   Auscultation of heart: Normal rate and rhythm, normal S1 and S2, without murmurs  Examination of extremities for edema and/or varicosities: Normal     Abdomen   Abdomen: Non-tender, no masses  Skin   Skin and subcutaneous tissue: Normal without rashes or lesions  Neurologic baseline spasticity in extremities  Psychiatric oriented to person  alert, calm  Attending Note  Collaborating Physician Note: Collaborating Physician: I agree with the Advanced Practitioner note  Future Appointments    Date/Time Provider Specialty Site   05/09/2017 02:00 PM LOURDES Olivares  Neurology [facility] NEUROLOGY [facility]   04/21/2017 09:00 AM LOURDES Castillo  [address] [place] [address]     Signatures   Electronically signed by : Marta Bassett, ; Apr  3 2017  1:16PM EST                       (Author)    Electronically signed by : Radha Winters, [address] [place] [address]; Apr 5 2017 10:19AM EST                       (Author)    Electronically signed by :  Corine Zapien MD; Apr 7 2017  8:36AM EST                       (Author)

## 2018-01-15 NOTE — MISCELLANEOUS
Message  Okay to hold ativan for sedation  Per Dr Dae Blanco  Signatures   Electronically signed by :  Dae Blanco MD; Jul 26 2016  9:10AM EST                       (Author)

## 2018-01-16 NOTE — MISCELLANEOUS
Chief Complaint  Chief Complaint Free Text Note Form: PT NO SHOWED APPT TODAY DUE TO RE-HOSPITALIZATION      History of Present Illness  TCM Communication [facility]: The dates of hospitalization:, date of admission: 1/31/2016, date of discharge: 2/17/2016  She was discharged with home health services, physical therapy and nursing services at her group home living  Medications reviewed and updated today  She scheduled a follow up appointment  The patient is currently asymptomatic  Referrals Needed:  Pt with weight loss while hospitalized question by caregiver for need of supplement for [place]  Counseling was provided to patient's caretaker  they will monitor for weight gain  Topics counseled included diagnostic results  Communication performed and completed by Romana Arguello RN      Active Problems    1  Abnormal blood chemistry (790 6) (R79 9)   2  Abrasion (919 0) (T14 8)   3  Acute conjunctivitis (372 00) (H10 30)   4  Allergic rhinitis due to pollen (477 0) (J30 1)   5  Anxiety (300 00) (F41 9)   6  Atopic dermatitis (691 8) (L20 9)   7  Bipolar disorder (296 80) (F31 9)   8  Change in mental status (780 97) (R41 82)   9  Chondrodermatitis nodularis helicis of left ear (351 47) (H61 002)   10  Closed fracture of left hip, sequela (905 3) (S72 002S)   11  Constipation (564 00) (K59 00)   12  Difficulty walking (719 7) (R26 2)   13  Diverticulosis (562 10) (K57 90)   14  DMII (diabetes mellitus, type 2) (250 00) (E11 9)   15  Dry eye (375 15) (H04 129)   16  Encounter for routine gynecological examination (V72 31) (Z01 419)   17  Encounter for staple removal (V58 32) (Z48 02)   18  Fatigue (780 79) (R53 83)   19  Gait disturbance (781 2) (R26 9)   20  Gastroesophageal reflux disease without esophagitis (530 81) (K21 9)   21  Herpes labialis (054 9) (B00 1)   22  Hip fracture, left (820 8) (S72 002A)   23  Hyperlipidemia (272 4) (E78 5)   24  Hypothyroidism (244 9) (E03 9)   25   Intellectual disability (319) (F79)   26  Laceration of head (873 8) (S01 91XA)   27  Lump Or Swelling In The Neck - Left Side (784 2)   28  Menopause (627 2) (Z78 0)   29  Microalbuminuria (791 0) (R80 9)   30  Need for 23-polyvalent pneumococcal polysaccharide vaccine (V03 82) (Z23)   31  Need for prophylactic vaccination and inoculation against influenza (V04 81) (Z23)   32  Osteoporosis (733 00) (M81 0)   33  Other chronic pain (338 29) (G89 29)   34  Postmenopausal bleeding (627 1) (N95 0)   35  Resting tremor (781 0) (R25 9)   36  Skin irritation (709 9) (R23 8)   37  Staring spell (780 02) (R40 4)   38  Status post fall (V15 88) (Z91 89)   39  Symptoms involving urinary system (788 99) (R39 9)   40  Thrombocytopenia (287 5) (D69 6)   41  Urge and stress incontinence (788 33) (N39 46)   42  Urinary tract infection (599 0) (N39 0)   43  Weight loss, non-intentional (783 21) (R63 4)    Past Medical History    1  History of Abnormal gait (781 2) (R26 9)   2  History of Adjustment disorder (309 9) (F43 20)   3  History of Chronic constipation (564 00) (K59 00)   4  History of anemia (V12 3) (Z86 2)   5  History of bipolar disorder (V11 1) (Z86 59)   6  History of hyperlipidemia (V12 29) (Z86 39)   7  History of mental retardation (V11 8) (Z86 59)   8  History of urinary incontinence (V13 09) (Z87 898)   9  History of Impulse control disorder (312 30) (F63 9)   10  History of Microalbuminuria (791 0) (R80 9)   11  Need for prophylactic vaccination and inoculation against influenza (V04 81) (Z23)    Family History    1  No pertinent family history    Social History    · Never A Smoker    Current Meds   1  Abilify 30 MG Oral Tablet; TAKE 1 TABLET DAILY; Therapy: 89WDD5746 to (Evaluate:07Jun2014)  Requested for: 68DIF2968; Last   TV:61KIM6506 Ordered   2  Abreva 10 % External Cream; APPLY AND RUB IN 5 TIMES DAILY UNTIL HEALED PRN   lesion; Therapy: 51EGC8880 to (Last Rx:02Jun2014)  Requested for: 02Jun2014 Ordered   3   Alendronate Sodium 70 MG Oral Tablet; TAKE ONE TABLET BY MOUTH WEEKLY; Therapy: 71QWT0408 to (Evaluate:11Tqz9899)  Requested for: 21Jan2016; Last   Rx:21Jan2016 Ordered   4  Citalopram Hydrobromide 40 MG Oral Tablet; Take 1 tablet daily; Therapy: 63YFP2708 to (Ezequiel Cesar)  Requested for: 07SQC8559; Last   ZN:53XBZ1269 Ordered   5  Colace 100 MG Oral Capsule; TAKE 1 CAPSULE TWICE DAILY; Therapy: 12Apr2013 to (Evaluate:07Apr2014); Last Rx:12Apr2013 Ordered   6  Divalproex Sodium 500 MG Oral Tablet Delayed Release; TAKE 2 TABLET Every morning; Last VX:51TYQ8168 Ordered   7  Dulcolax 10 MG Rectal Suppository; INSERT ONE RECTALLY Q3RD DAY IF NO BM; Therapy: ([phone]) to Recorded   8  Ferrous Sulfate 325 (65 Fe) MG Oral Tablet; Take 1 tablet twice a day; Therapy: 12Apr2013 to (Evaluate:08Nov2013); Last Rx:12Apr2013 Ordered   9  Fexofenadine HCl - 180 MG Oral Tablet; TAKE 1 TABLET DAILY AS NEEDED FOR   ALLERGIES; Therapy: 41HQO7527 to (Evaluate:04Nov2014); Last KZ:82DEP5369 Ordered   10  Hydrocortisone 1 % External Cream; APPLY SPARINGLY TO AFFECTED AREA(S) TWICE    DAILY; Therapy: 19XWT0591 to (Last Rx:02Jun2014)  Requested for: 02Jun2014 Ordered   11  Levothyroxine Sodium 25 MCG Oral Tablet; Take 1 tablet daily; Therapy: 74EVF5415 to (Evaluate:05Rxe8606)  Requested for: 23ZTA2020; Last    EW:96SIM7562 Ordered   12  Lisinopril 2 5 MG Oral Tablet; TAKE 1 TABLET DAILY; Therapy: 58Bjp2605 to (Evaluate:07Jun2014)  Requested for: 13ELK0455; Last    SB:63LKJ8404 Ordered   13  LORazepam 0 5 MG Oral Tablet; Take 1 tablet twice daily; Therapy: 93FSC7363 to (Evaluate:07Jun2014); Last VS:56OVE4596 Ordered   14  MetFORMIN HCl - 1000 MG Oral Tablet; TAKE 1 TABLET TWICE DAILY; Therapy: 87EZB7034 to (Evaluate:14Sep2014) Recorded   15  MiraLax Oral Packet; MIX 1 PACKET IN 8 OUNCES OF LIQUID AND DRINK ONCE DAILY; Therapy: ([phone]) to Recorded   16   Neosporin AF 2 % External Cream; APPLY SPARINGLY TO AFFECTED AREA(S) TWICE    DAILY PRN; Therapy: (433 6144) to Recorded   17  Omeprazole 20 MG Oral Tablet Delayed Release; take 1 tab po QD; Therapy: 76AUV7072 to (Last Rx:21Jan2016) Ordered   18  Oxybutynin Chloride ER 5 MG Oral Tablet Extended Release 24 Hour; TAKE 1 TABLET 3    times daily; Last TM:50EPZ5550 Ordered   19  Pataday 0 2 % Ophthalmic Solution; INSTILL 1 DROP Daily PRN For eye redness; Therapy: 35TBG6803 to (Last EN:69EWM6898)  Requested for: 63TSD5817 Ordered   20  Polyethylene Glycol 3350 Oral Powder; MIX IN 8OZ FLUID PO Q48HRS IF NO BM PRN; Therapy: 37BJC9357 to Recorded   21  Refresh 1 4-0 6 % Ophthalmic Solution; INSTILL 1 DROP INTO AFFECTED EYE(S) 3    TIMES DAILY PRN eye irritation; Therapy: 60FGD5708 to (Last Rx:02Jun2014)  Requested for: 02Jun2014 Ordered   22  Simvastatin 20 MG Oral Tablet; TAKE 1 TABLET DAILY IN THE EVENING; Therapy: 82ZPU3338 to (Aimee Argueta)  Requested for: 27MYK7141; Last    HT:67CRS6811 Ordered   23  TraZODone HCl - 100 MG Oral Tablet; TAKE 1 TABLET AT BEDTIME; Therapy: 31SYR3508 to (Evaluate:07Jun2014)  Requested for: 05ANG6391; Last    VX:60BQX2101 Ordered   24  Tylenol 325 MG Oral Tablet; 2 tabs po Q 4 hours prn pain /temp >101; Therapy: 91Eij0381 to Recorded    Allergies    1  No Known Drug Allergies    Future Appointments    Date/Time Provider Specialty Site   04/05/2016 11:00 AM LOURDES Solis  Neurology [facility]   04/11/2016 09:50 AM Kiran Edwards DO [facility]   03/01/2016 10:00 AM Joanna Cruz [facility]phrology [address] [facility]     Signatures   Electronically signed by : Sima Sharma, ; Feb 22 2016  7:31PM EST                       (Author)    Electronically signed by :  Yumiko Norman MD; Feb 24 2016 10:57AM EST                       (Author)

## 2018-01-16 NOTE — MISCELLANEOUS
Message  Message Free Text Note Form: Spoke with nurse from [facility] who reported that Pt has not urinated since yesterday afternoon, and has had sugars aaround 300-350 this morning  Pt also had a UA recently concerning for infection and a urine culture that came back positive for >100,000 cfu klebsiella pnuemoniae, which was never reported to the nursing home  Encouraged Pt be taken to the ED ASAP        Signatures   Electronically signed by : Catracho Perez DO; Jan 31 2016  8:39AM EST                       (Author)    Electronically signed by : Bernice Aldana DO; Feb 1 2016  9:13AM EST                       (Author)

## 2018-01-17 NOTE — RESULT NOTES
Verified Results  * MAMMO SCREENING BILATERAL W CAD [id number] 01:36PM Eyad Luciano Order Number: [id number]     Test Name Result Flag Reference   MAMMO SCREENING BILATERAL W CAD (Report)     Patient History:   Patient is postmenopausal and is nulliparous  No known family history of cancer  Took hormonal contraceptives for 2 years 3 months beginning at    age 42  Patient has never smoked  Patient's BMI is 24 0  Reason for exam: screening (asymptomatic)  Mammo Screening Bilateral W CAD: May 23, 2016 - Check In #:    [de-identified]   Bilateral CC and MLO view(s) were taken  Technologists: RT Ross(R)(M); MARCELL Dos Santos (R)(M)   Prior study comparison: May 21, 2015, digital bilateral screening   mammogram performed at [facility]  May 19,    2014, digital bilateral screening mammogram performed at [facility]  May 17, 2013, digital bilateral    screening mammogram performed at [facility]  May 16, 2012, digital bilateral screening mammogram performed at    [facility]  May 11, 2011, digital bilateral    screening mammogram performed at [facility]  May 10, 2010, digital bilateral screening mammogram performed at    [address]  There are scattered fibroglandular densities  No dominant soft tissue mass, architectural distortion or    suspicious calcifications are noted in either breast   The skin    and nipple structures are within normal limits  Scattered benign   appearing calcifications are noted  No significant changes when compared with prior studies  ASSESSMENT: BiRad:2 - Benign     Recommendation:   Routine screening mammogram of both breasts in 1 year  A    reminder letter will be scheduled  Analyzed by CAD     8-10% of cancers will be missed on mammography  Management of a    palpable abnormality must be based on clinical grounds   Patients   will be notified of their results via letter from our facility  Accredited by [employer] of [employer]iology and FDA       Transcription Location: [facility] Cm 98: [id number]     Risk Value(s):   Tyrer-Cuzick 10 Year: 2 499%, Tyrer-Cuzick Lifetime: 9 261%,    Myriad Table: 1 5%, IRVIN 5 Year: 1 1%, NCI Lifetime: 8 6%

## 2018-01-17 NOTE — RESULT NOTES
Verified Results  (1) CBC/PLT/DIFF 23Hxp8384 11:15AM Robinson Mccall Order Number: [id number]     Order Number: [id number]     Test Name Result Flag Reference   WBC COUNT 7 93 Thousand/uL  4 31-10 16   RBC COUNT 3 50 Million/uL L 3 81-5 12   HEMOGLOBIN 11 5 g/dL  11 5-15 4   HEMATOCRIT 36 3 %  34 8-46  1    fL H 82-98   MCH 32 9 pg  26 8-34 3   MCHC 31 7 g/dL  31 4-37 4   RDW 14 3 %  11 6-15 1   MPV 11 9 fL  8 9-12 7   PLATELET COUNT 266 Thousands/uL  149-390   nRBC AUTOMATED 0 /100 WBCs     NEUTROPHILS RELATIVE PERCENT 60 %  43-75   LYMPHOCYTES RELATIVE PERCENT 31 %  14-44   MONOCYTES RELATIVE PERCENT 6 %  4-12   EOSINOPHILS RELATIVE PERCENT 3 %  0-6   BASOPHILS RELATIVE PERCENT 0 %  0-1   NEUTROPHILS ABSOLUTE COUNT 4 63 Thousands/µL  1 85-7 62   LYMPHOCYTES ABSOLUTE COUNT 2 49 Thousands/µL  0 60-4 47   MONOCYTES ABSOLUTE COUNT 0 51 Thousand/µL  0 17-1 22   EOSINOPHILS ABSOLUTE COUNT 0 24 Thousand/µL  0 00-0 61   BASOPHILS ABSOLUTE COUNT 0 03 Thousands/µL  0 00-0 10

## 2018-01-17 NOTE — RESULT NOTES
Verified Results  Hemoglobin A1c- POC [id number] 02:19PM Adina Joshua     Test Name Result Flag Reference   HEMOGLOBIN A1C 6 4

## 2018-01-22 VITALS
WEIGHT: 108 LBS | HEIGHT: 58 IN | BODY MASS INDEX: 22.67 KG/M2 | HEART RATE: 70 BPM | RESPIRATION RATE: 16 BRPM | DIASTOLIC BLOOD PRESSURE: 70 MMHG | SYSTOLIC BLOOD PRESSURE: 122 MMHG | TEMPERATURE: 96.9 F

## 2018-01-22 VITALS
DIASTOLIC BLOOD PRESSURE: 64 MMHG | TEMPERATURE: 95.7 F | HEART RATE: 56 BPM | BODY MASS INDEX: 21.7 KG/M2 | WEIGHT: 103.38 LBS | HEIGHT: 58 IN | RESPIRATION RATE: 16 BRPM | SYSTOLIC BLOOD PRESSURE: 100 MMHG

## 2018-01-22 VITALS
HEIGHT: 58 IN | DIASTOLIC BLOOD PRESSURE: 64 MMHG | SYSTOLIC BLOOD PRESSURE: 104 MMHG | WEIGHT: 114.13 LBS | BODY MASS INDEX: 23.96 KG/M2 | HEART RATE: 78 BPM | TEMPERATURE: 95.8 F | RESPIRATION RATE: 16 BRPM

## 2018-01-23 ENCOUNTER — GENERIC CONVERSION - ENCOUNTER (OUTPATIENT)
Dept: OTHER | Facility: OTHER | Age: 58
End: 2018-01-23

## 2018-01-23 DIAGNOSIS — E78.5 HYPERLIPIDEMIA: ICD-10-CM

## 2018-01-23 DIAGNOSIS — I95.9 HYPOTENSION: ICD-10-CM

## 2018-01-23 DIAGNOSIS — E11.9 TYPE 2 DIABETES MELLITUS WITHOUT COMPLICATIONS (HCC): ICD-10-CM

## 2018-01-23 DIAGNOSIS — D64.9 ANEMIA: ICD-10-CM

## 2018-01-23 DIAGNOSIS — R77.0 ABNORMALITY OF ALBUMIN: ICD-10-CM

## 2018-01-23 DIAGNOSIS — E87.5 HYPERKALEMIA: ICD-10-CM

## 2018-01-23 DIAGNOSIS — Z93.3 COLOSTOMY STATUS (HCC): ICD-10-CM
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
## 2018-01-30 ENCOUNTER — HOSPITAL ENCOUNTER (OUTPATIENT)
Dept: RADIOLOGY | Age: 58
Discharge: HOME | End: 2018-01-30
Payer: COMMERCIAL

## 2018-01-30 DIAGNOSIS — M81.0 AGE-RELATED OSTEOPOROSIS WITHOUT CURRENT PATHOLOGICAL FRACTURE: ICD-10-CM

## 2018-01-30 PROCEDURE — 77080 DXA BONE DENSITY AXIAL: CPT

## 2018-02-01 ENCOUNTER — TELEPHONE (OUTPATIENT)
Dept: FAMILY MEDICINE CLINIC | Facility: CLINIC | Age: 58
End: 2018-02-01

## 2018-02-01 DIAGNOSIS — R53.81 PHYSICAL DECONDITIONING: Primary | ICD-10-CM

## 2018-04-06 ENCOUNTER — OFFICE VISIT (OUTPATIENT)
Dept: URGENT CARE | Age: 58
End: 2018-04-06
Payer: COMMERCIAL

## 2018-04-06 VITALS
HEART RATE: 83 BPM | DIASTOLIC BLOOD PRESSURE: 60 MMHG | HEIGHT: 58 IN | BODY MASS INDEX: 23.62 KG/M2 | TEMPERATURE: 97.7 F | WEIGHT: 112.5 LBS | SYSTOLIC BLOOD PRESSURE: 122 MMHG | OXYGEN SATURATION: 97 %
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
## 2018-04-06 DIAGNOSIS — Z04.1 MOTOR VEHICLE ACCIDENT WITH NO INJURY: Primary | ICD-10-CM

## 2018-04-06 PROCEDURE — G0382 LEV 3 HOSP TYPE B ED VISIT: HCPCS | Performed by: FAMILY MEDICINE
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
## 2018-04-18 ENCOUNTER — TRANSCRIBE ORDERS (OUTPATIENT)
Dept: LAB | Facility: CLINIC | Age: 58
End: 2018-04-18

## 2018-04-18 ENCOUNTER — APPOINTMENT (OUTPATIENT)
Dept: LAB | Facility: CLINIC | Age: 58
End: 2018-04-18
Payer: COMMERCIAL

## 2018-04-18 DIAGNOSIS — Z93.3 COLOSTOMY STATUS (HCC): ICD-10-CM

## 2018-04-18 DIAGNOSIS — D64.9 ANEMIA: ICD-10-CM

## 2018-04-18 DIAGNOSIS — F31.9 BIPOLAR AFFECTIVE DISORDER, REMISSION STATUS UNSPECIFIED (HCC): Primary | ICD-10-CM

## 2018-04-18 DIAGNOSIS — I95.9 HYPOTENSION: ICD-10-CM

## 2018-04-18 DIAGNOSIS — E11.9 TYPE 2 DIABETES MELLITUS WITHOUT COMPLICATIONS (HCC): ICD-10-CM

## 2018-04-18 DIAGNOSIS — R77.0 ABNORMALITY OF ALBUMIN: ICD-10-CM

## 2018-04-18 DIAGNOSIS — F31.9 BIPOLAR AFFECTIVE DISORDER, REMISSION STATUS UNSPECIFIED (HCC): ICD-10-CM

## 2018-04-18 DIAGNOSIS — E78.5 HYPERLIPIDEMIA: ICD-10-CM

## 2018-04-18 DIAGNOSIS — E87.5 HYPERKALEMIA: ICD-10-CM

## 2018-04-18 LAB
ALBUMIN SERPL BCP-MCNC: 3.4 G/DL (ref 3.5–5)
ALP SERPL-CCNC: 40 U/L (ref 46–116)
ALT SERPL W P-5'-P-CCNC: 24 U/L (ref 12–78)
AMMONIA PLAS-SCNC: <10 UMOL/L (ref 11–35)
ANION GAP SERPL CALCULATED.3IONS-SCNC: 8 MMOL/L (ref 4–13)
AST SERPL W P-5'-P-CCNC: 18 U/L (ref 5–45)
BASOPHILS # BLD AUTO: 0.03 THOUSANDS/ΜL (ref 0–0.1)
BASOPHILS NFR BLD AUTO: 1 % (ref 0–1)
BILIRUB DIRECT SERPL-MCNC: 0.06 MG/DL (ref 0–0.2)
BILIRUB SERPL-MCNC: 0.2 MG/DL (ref 0.2–1)
BUN SERPL-MCNC: 18 MG/DL (ref 5–25)
CALCIUM SERPL-MCNC: 9.6 MG/DL (ref 8.3–10.1)
CHLORIDE SERPL-SCNC: 104 MMOL/L (ref 100–108)
CHOLEST SERPL-MCNC: 155 MG/DL (ref 50–200)
CK SERPL-CCNC: 65 U/L (ref 26–192)
CO2 SERPL-SCNC: 34 MMOL/L (ref 21–32)
CREAT SERPL-MCNC: 0.83 MG/DL (ref 0.6–1.3)
EOSINOPHIL # BLD AUTO: 0.53 THOUSAND/ΜL (ref 0–0.61)
EOSINOPHIL NFR BLD AUTO: 9 % (ref 0–6)
ERYTHROCYTE [DISTWIDTH] IN BLOOD BY AUTOMATED COUNT: 13.1 % (ref 11.6–15.1)
GFR SERPL CREATININE-BSD FRML MDRD: 80 ML/MIN/1.73SQ M
GLUCOSE P FAST SERPL-MCNC: 110 MG/DL (ref 65–99)
HCT VFR BLD AUTO: 39.8 % (ref 34.8–46.1)
HDLC SERPL-MCNC: 49 MG/DL (ref 40–60)
HGB BLD-MCNC: 13.1 G/DL (ref 11.5–15.4)
LDLC SERPL CALC-MCNC: 84 MG/DL (ref 0–100)
LYMPHOCYTES # BLD AUTO: 1.95 THOUSANDS/ΜL (ref 0.6–4.47)
LYMPHOCYTES NFR BLD AUTO: 34 % (ref 14–44)
MCH RBC QN AUTO: 32.8 PG (ref 26.8–34.3)
MCHC RBC AUTO-ENTMCNC: 32.9 G/DL (ref 31.4–37.4)
MCV RBC AUTO: 100 FL (ref 82–98)
MONOCYTES # BLD AUTO: 0.47 THOUSAND/ΜL (ref 0.17–1.22)
MONOCYTES NFR BLD AUTO: 8 % (ref 4–12)
NEUTROPHILS # BLD AUTO: 2.74 THOUSANDS/ΜL (ref 1.85–7.62)
NEUTS SEG NFR BLD AUTO: 48 % (ref 43–75)
NONHDLC SERPL-MCNC: 106 MG/DL
PLATELET # BLD AUTO: 143 THOUSANDS/UL (ref 149–390)
PMV BLD AUTO: 12.9 FL (ref 8.9–12.7)
POTASSIUM SERPL-SCNC: 4.2 MMOL/L (ref 3.5–5.3)
PROLACTIN SERPL-MCNC: 11.2 NG/ML
PROT SERPL-MCNC: 7.3 G/DL (ref 6.4–8.2)
RBC # BLD AUTO: 3.99 MILLION/UL (ref 3.81–5.12)
SODIUM SERPL-SCNC: 146 MMOL/L (ref 136–145)
T3 SERPL-MCNC: 1 NG/ML (ref 0.6–1.8)
T4 SERPL-MCNC: 11.5 UG/DL (ref 4.7–13.3)
TRIGL SERPL-MCNC: 111 MG/DL
TSH SERPL DL<=0.05 MIU/L-ACNC: 3.49 UIU/ML (ref 0.36–3.74)
VALPROATE SERPL-MCNC: 43 UG/ML (ref 50–100)
WBC # BLD AUTO: 5.72 THOUSAND/UL (ref 4.31–10.16)

## 2018-04-18 PROCEDURE — 84480 ASSAY TRIIODOTHYRONINE (T3): CPT

## 2018-04-18 PROCEDURE — 84436 ASSAY OF TOTAL THYROXINE: CPT

## 2018-04-18 PROCEDURE — 82550 ASSAY OF CK (CPK): CPT

## 2018-04-18 PROCEDURE — 82140 ASSAY OF AMMONIA: CPT

## 2018-04-18 PROCEDURE — 83036 HEMOGLOBIN GLYCOSYLATED A1C: CPT | Performed by: PSYCHIATRY & NEUROLOGY

## 2018-04-18 PROCEDURE — 80053 COMPREHEN METABOLIC PANEL: CPT

## 2018-04-18 PROCEDURE — 36415 COLL VENOUS BLD VENIPUNCTURE: CPT | Performed by: PSYCHIATRY & NEUROLOGY

## 2018-04-18 PROCEDURE — 85025 COMPLETE CBC W/AUTO DIFF WBC: CPT

## 2018-04-18 PROCEDURE — 82248 BILIRUBIN DIRECT: CPT

## 2018-04-18 PROCEDURE — 84443 ASSAY THYROID STIM HORMONE: CPT

## 2018-04-18 PROCEDURE — 80061 LIPID PANEL: CPT

## 2018-04-18 PROCEDURE — 84146 ASSAY OF PROLACTIN: CPT

## 2018-04-18 PROCEDURE — 80164 ASSAY DIPROPYLACETIC ACD TOT: CPT

## 2018-04-19 ENCOUNTER — OFFICE VISIT (OUTPATIENT)
Dept: FAMILY MEDICINE CLINIC | Facility: CLINIC | Age: 58
End: 2018-04-19
Payer: COMMERCIAL

## 2018-04-19 VITALS
WEIGHT: 115.8 LBS | BODY MASS INDEX: 24.31 KG/M2 | SYSTOLIC BLOOD PRESSURE: 102 MMHG | HEIGHT: 58 IN | RESPIRATION RATE: 16 BRPM | HEART RATE: 72 BPM | TEMPERATURE: 95.4 F | DIASTOLIC BLOOD PRESSURE: 62 MMHG

## 2018-04-19 DIAGNOSIS — R26.2 AMBULATORY DYSFUNCTION: ICD-10-CM

## 2018-04-19 DIAGNOSIS — E78.49 OTHER HYPERLIPIDEMIA: ICD-10-CM

## 2018-04-19 DIAGNOSIS — Z12.31 SCREENING MAMMOGRAM, ENCOUNTER FOR: Primary | ICD-10-CM

## 2018-04-19 DIAGNOSIS — E03.9 HYPOTHYROIDISM (ACQUIRED): ICD-10-CM

## 2018-04-19 DIAGNOSIS — F79 INTELLECTUAL DISABILITY: ICD-10-CM

## 2018-04-19 PROBLEM — E11.9 TYPE 2 DIABETES MELLITUS, WITHOUT LONG-TERM CURRENT USE OF INSULIN (HCC): Status: ACTIVE | Noted: 2018-04-19

## 2018-04-19 LAB
EST. AVERAGE GLUCOSE BLD GHB EST-MCNC: 146 MG/DL
HBA1C MFR BLD: 6.7 % (ref 4.2–6.3)

## 2018-04-19 PROCEDURE — 99213 OFFICE O/P EST LOW 20 MIN: CPT | Performed by: FAMILY MEDICINE

## 2018-04-19 RX ORDER — NITROFURANTOIN MACROCRYSTALS 50 MG/1
1 CAPSULE ORAL
Status: ON HOLD | COMMUNITY
Start: 2016-04-11 | End: 2020-07-26

## 2018-04-19 RX ORDER — DIAPER,BRIEF,INFANT-TODD,DISP
EACH MISCELLANEOUS 2 TIMES DAILY
COMMUNITY
Start: 2014-06-02 | End: 2018-09-28 | Stop reason: SDUPTHER

## 2018-04-19 RX ORDER — LANOLIN AND PETROLATUM 136.4; 469.9 MG/G; MG/G
OINTMENT TOPICAL
COMMUNITY
Start: 2017-05-22 | End: 2019-11-12

## 2018-04-19 RX ORDER — DOCOSANOL 100 MG/G
CREAM TOPICAL
COMMUNITY
Start: 2014-05-08 | End: 2018-09-28 | Stop reason: SDUPTHER

## 2018-04-19 RX ORDER — ACETAMINOPHEN 325 MG/1
TABLET ORAL
COMMUNITY
Start: 2014-08-28 | End: 2018-06-08 | Stop reason: SDUPTHER

## 2018-04-19 NOTE — LETTER
April 19, 2018     No Recipients    Patient: Maria Elena Franco   YOB: 1960   Date of Visit: 4/19/2018       Dear Dr Sage Sanchez Recipients: Thank you for referring Jazmin Noe to me for evaluation  Discontinue use of Vaseline of left ear  If you have questions, please do not hesitate to call me  I look forward to following your patient along with you  Sincerely,        Lauri Guerrero MD        CC: No Recipients  Lauri Guerrero MD  4/19/2018  2:04 PM  Sign at close encounter  Maria Elena Franco 1960 female MRN: [id number]    Family Medicine Follow-up Visit    ASSESSMENT/PLAN  Maria Elena Franco is a 56 y o  female with significant PmhX of   Intellectual disability, diabetes type 2, hypertension, hyperlipidemia, ambulatory dysfunction presenting to the office for    1) Type 2 diabetes with peripheral neuropathy  - Recent A1c slightly elevated from previous at 6 7%  Likely 2/2 to the patient nutrition state improving   - Will make no adjustments today and continue Metformin 1000 mg BID  2) Hypothyroidism  - Repeat TSH 3 4 89 on 4 18    - Controlled at this time; Continue Synthroid 25mcg  3) Hyperlipidemia  - Repeat will be obtained in October 2018  Scripts given to care giver today  4) Osteoporosis:  - DEXA performed on 1/18 showed improvement in density  Patient currently on Alendronate, however given patient intellectual disability I believe that Prolia would be better for the patient  Will call in script and obtain baseline BMP  Disposition: Return to the clinic in  15 weeks    Health Maintence: 1  Colonoscopy: up-to-date performed on 7/15  2  Mammo:  Patient due for repeat mammogram in May of 2018  Script given today  3  Papsmear: (-) 10/30/17  4  DEXA:  Osteoporosis  Present on 01/19/2018  5  Ambulation: Use of Walker/ wheel chair   Currently under PT treatment    Future Appointments  Date Time Provider [place]   4/19/2018 2:00 PM Lauri Guerrero MD [facility]   5/24/2018 11:00 AM Azam Rai MD NEURO [facility]   5/25/2018 9:40 AM [facility] 1 [facility]          SUBJECTIVE  CC: No chief complaint on file  HPI:  Ibis Barragan is a 56 y o  female with significant PmhX of   Intellectual disability, diabetes type 2, hypertension, hyperlipidemia, ambulatory dysfunction presenting to the office for a  12 week follow up    Review of Systems   Constitutional: Negative for activity change and appetite change  HENT: Negative for congestion and sore throat  Respiratory: Negative for cough, chest tightness and shortness of breath  Cardiovascular: Negative for chest pain  Gastrointestinal: Negative for abdominal distention and abdominal pain  Musculoskeletal: Negative for arthralgias and back pain  Skin: Negative for rash  Neurological: Negative for dizziness  All other systems reviewed and are negative  Historical Information   The patient history was reviewed as follows:    Past Medical History:   Diagnosis Date    Adjustment disorder     Anemia     Bipolar 1 disorder (HCC)     Cerebral palsy (HCC)     Closed left hip fracture (HCC)     no surgery    Constipation     Diabetes mellitus ([facility] [address] [facility]     Disease of thyroid gland     Diverticulosis     Fracture of multiple ribs of right side     Hip fracture ([facility] [address] ) 07/26/2015    left    Hyperlipidemia     Hypotension     Impulse control disorder     Incontinence     Intellectual disability due to developmental disorder, unspecified     Microalbuminuria     Osteopathia     Osteoporosis     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HCC)      Past Surgical History:   Procedure Laterality Date    ABDOMINAL SURGERY      COLECTOMY MIN      re-anastomosis 7/22/16    COLONOSCOPY N/A 7/21/2016    Procedure: COLONOSCOPY;  Surgeon: Maura Walter MD;  Location: [facility];   Service:     COLONOSCOPY N/A 1/31/2016    Procedure: COLONOSCOPY;  Surgeon: Maura Walter MD;  Location: [facility];  Service:     COLONOSCOPY N/A 7/25/2017    Procedure: COLONOSCOPY;  Surgeon: Kole Red MD;  Location: [facility]; Service: Colorectal    COLOSTOMY      EXPLORATORY LAPAROTOMY W/ BOWEL RESECTION N/A 1/31/2016    Procedure: exploratory laparotomy, left sigmoidectomy, coloproctostomy, take down splenic flexure, loop colostomy;  Surgeon: Kole Red MD;  Location: [facility];  Service:     KS CLOSE ENTEROSTOMY N/A 7/22/2016    Procedure: SEGMENTAL COLECTOMY WITH COLOCOLOSTOMY;  Surgeon: Kole Red MD;  Location: [facility];  Service: Colorectal     Family History   Problem Relation Age of Onset    Family history unknown: Yes      Social History   History   Alcohol Use No     History   Drug Use No     History   Smoking Status    Never Smoker   Smokeless Tobacco    Never Used       Medications:     Current Outpatient Prescriptions:     alendronate (FOSAMAX) 70 mg tablet, Take 70 mg by mouth every 7 days Indications: take every Wednesday as ordered  Take in the morning with a full glass of water, on an empty stomach, and do not take anything else by mouth or lie down for the next 30 min , Disp: , Rfl:     ARIPiprazole (ABILIFY) 30 mg tablet, Take 30 mg by mouth daily at bedtime  , Disp: , Rfl:     calcium carbonate 1250 MG capsule, Take 1,250 mg by mouth daily  , Disp: , Rfl:     Cholecalciferol (VITAMIN D) 2000 UNITS tablet, Take 2,000 Units by mouth daily  , Disp: , Rfl:     Citalopram Hydrobromide (CELEXA PO), Take 40 mg by mouth every morning  , Disp: , Rfl:     divalproex sodium (DEPAKOTE) 500 mg EC tablet, Take 1,000 mg by mouth daily    , Disp: , Rfl:     Docusate Sodium (COLACE PO), Take 100 mg by mouth 2 (two) times a day   , Disp: , Rfl:     ferrous sulfate 324 (65 Fe) mg, Take 325 mg by mouth 2 (two) times a day , Disp: , Rfl:     fexofenadine (ALLEGRA) 180 MG tablet, Take 180 mg by mouth as needed, Disp: , Rfl:     Levothyroxine Sodium (SYNTHROID PO), Take 25 mcg by mouth daily  , Disp: , Rfl:     LORazepam (ATIVAN) 0 5 mg tablet, Take 0 5 mg by mouth 2 (two) times a day   , Disp: , Rfl:     MetFORMIN HCl (GLUCOPHAGE PO), Take 1,000 mg by mouth 2 (two) times a day , Disp: , Rfl:     Nutritional Supplements (BOOST PUDDING) PUDG, Take 1 Bottle by mouth 3 (three) times a day before meals for 30 days, Disp: 100 Can, Rfl: 3    olopatadine HCl (PATADAY) 0 2 % opth drops, Administer 1 drop to both eyes as needed, Disp: , Rfl:     omeprazole (PriLOSEC) 20 mg delayed release capsule, Take 20 mg by mouth daily, Disp: , Rfl:     Oxybutynin Chloride (DITROPAN XL PO), Take 5 mg by mouth 3 (three) times a day   , Disp: , Rfl:     Polyethylene Glycol 3350 (MIRALAX PO), Take 17 g by mouth every morning  , Disp: , Rfl:     simvastatin (ZOCOR) 20 mg tablet, Take 20 mg by mouth , Disp: , Rfl:     TraZODone HCl (DESYREL PO), Take 100 mg by mouth daily at bedtime  , Disp: , Rfl:   Allergies   Allergen Reactions    No Active Allergies     Other      No known latex allergy       OBJECTIVE    Vitals: There were no vitals filed for this visit  Physical Exam   Constitutional: She is oriented to person, place, and time  She appears well-developed and well-nourished  HENT:   Head: Normocephalic and atraumatic  Eyes: Conjunctivae and EOM are normal  Pupils are equal, round, and reactive to light  Neck: Normal range of motion  Neck supple  Cardiovascular: Normal rate, regular rhythm, normal heart sounds and intact distal pulses  No murmur heard  Pulmonary/Chest: Effort normal and breath sounds normal  No respiratory distress  She has no wheezes  Abdominal: Soft  Bowel sounds are normal  She exhibits no distension  There is no tenderness  Musculoskeletal: Normal range of motion  She exhibits no edema  Neurological: She is alert and oriented to person, place, and time  Skin: Skin is warm and dry  No rash noted  Psychiatric:   Non verbal   Vitals reviewed  Toni Liang MD, PGY-3  [facility]   4/19/2018

## 2018-04-19 NOTE — PROGRESS NOTES
Viktoria Espinal 1960 female MRN: [id number]    Family Medicine Follow-up Visit    ASSESSMENT/PLAN  Viktoria Espinal is a 56 y o  female with significant PmhX of  Severe Intellectual disability, diabetes type 2, hypertension, hyperlipidemia, ambulatory dysfunction presenting to the office for    1) Type 2 diabetes with peripheral neuropathy  - Recent A1c slightly elevated from previous at 6 7%  Likely 2/2 to the patient nutrition state improving   - Will make no adjustments today and continue Metformin 1000 mg BID   - Podiatry: Reviewed visit note  2) Hypothyroidism  - Repeat TSH 3 4 89 on 4 18    - Controlled at this time; Continue Synthroid 25mcg  3) Hyperlipidemia  - Controlled on Simvastatin 20 mg  Repeat April 2019  - Reviewed labs today    4) Osteoporosis:  - DEXA performed on 1/18 showed improvement in density  Patient currently on Alendronate, however given patient Severe intellectual disability, and hx of GERD I believe that Prolia would be better for the patient  Will call in script and obtain baseline BMP  Continue Alendronate till Prolia is avalible  - BP log reviewed  Continue off medication given history of hypotension with ACE inhibitors  Disposition: Return to the clinic in  12 weeks  Due for   Health Maintence: 1  Colonoscopy: up-to-date performed on 7/15  2  Mammo:  Patient due for repeat mammogram in May of 2018  Script given today  3  Papsmear: (-) 10/30/17  4  DEXA:  Osteoporosis  Present on 01/19/2018  5  Ambulation: Use of Walker/ wheel chair  Completed PT  6  PPD screening: Negative on 1/23/18; repeat in 2 years  7   Vaccine: Flu of 10/17    Future Appointments  Date Time Provider [place]   5/24/2018 11:00 AM [facility]ade [facility]   5/25/2018 9:40 AM [facility] 1 [facility]          SUBJECTIVE  CC: Follow-up      HPI:  Viktoria Espinal is a 56 y o  female with significant PmhX of   Intellectual disability, diabetes type 2, hypertension, hyperlipidemia, ambulatory dysfunction presenting to the office for a  12 week follow up  -  patient is a script for mammogram today  -  Patient's nutrition status has improved therefore does not need boost pudding  -  Dermatology appointment is going to be in July  -  Completed physical therapy at this time  Patient's wound has improved since then  Review of Systems   Constitutional: Negative for activity change and appetite change  HENT: Negative for congestion and sore throat  Eyes: Negative  Respiratory: Negative for cough, chest tightness and shortness of breath  Cardiovascular: Negative for chest pain  Gastrointestinal: Negative for abdominal distention and abdominal pain  Endocrine: Negative  Genitourinary:        Continue on a tolieting schedule, but in content at baseline   Musculoskeletal: Negative for arthralgias and back pain  Skin: Negative for rash  Allergic/Immunologic: Negative  Neurological: Negative for dizziness  Psychiatric/Behavioral: Positive for agitation (At baseline)  All other systems reviewed and are negative        Historical Information   The patient history was reviewed as follows:    Past Medical History:   Diagnosis Date    Adjustment disorder     Anemia     Bipolar 1 disorder (HCC)     [facility])     Closed left hip fracture ([facility])     no surgery    Constipation     Diabetes mellitus ([facility] [address]     Disease of thyroid gland     Diverticulosis     Fracture of multiple ribs of right side     Hip fracture ([facility] [address] [facility] 07/26/2015    left    Hyperlipidemia     Hypotension     Impulse control disorder     Incontinence     Intellectual disability due to developmental disorder, unspecified     Microalbuminuria     Osteopathia     Osteoporosis     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[facility])      Past Surgical History:   Procedure Laterality Date    ABDOMINAL SURGERY      COLECTOMY MIN      re-anastomosis 7/22/16    COLONOSCOPY N/A 7/21/2016 Procedure: COLONOSCOPY;  Surgeon: Toma Domingo MD;  Location: [facility]; Service:     COLONOSCOPY N/A 1/31/2016    Procedure: COLONOSCOPY;  Surgeon: Toma Domingo MD;  Location: [facility];  Service:     COLONOSCOPY N/A 7/25/2017    Procedure: COLONOSCOPY;  Surgeon: Toma Domingo MD;  Location: [facility]; Service: Colorectal    COLOSTOMY      EXPLORATORY LAPAROTOMY W/ BOWEL RESECTION N/A 1/31/2016    Procedure: exploratory laparotomy, left sigmoidectomy, coloproctostomy, take down splenic flexure, loop colostomy;  Surgeon: Toma Domingo MD;  Location: [facility];  Service:     DE CLOSE ENTEROSTOMY N/A 7/22/2016    Procedure: SEGMENTAL COLECTOMY WITH COLOCOLOSTOMY;  Surgeon: Toma Domingo MD;  Location: [facility];  Service: Colorectal     Family History   Problem Relation Age of Onset    Family history unknown: Yes      Social History   History   Alcohol Use No     History   Drug Use No     History   Smoking Status    Never Smoker   Smokeless Tobacco    Never Used       Medications:     Current Outpatient Prescriptions:     acetaminophen (TYLENOL) 325 mg tablet, Take by mouth, Disp: , Rfl:     alendronate (FOSAMAX) 70 mg tablet, Take 70 mg by mouth every 7 days Take in the morning on Wednesday with a full glass of water, on an empty stomach, and do not take anything else by mouth or lie down for the next hour , Disp: , Rfl:     ARIPiprazole (ABILIFY) 30 mg tablet, Take 30 mg by mouth daily at bedtime  , Disp: , Rfl:     calcium carbonate 1250 MG capsule, Take 500 mg by mouth daily  , Disp: , Rfl:     carbamide peroxide (DEBROX) 6 5 % otic solution, Administer into ears, Disp: , Rfl:     Cholecalciferol (VITAMIN D) 2000 UNITS tablet, Take 2,000 Units by mouth daily  , Disp: , Rfl:     Citalopram Hydrobromide (CELEXA PO), Take 40 mg by mouth every morning  , Disp: , Rfl:     Diaper Rash Products (A+D PREVENT) OINT, Apply topically, Disp: , Rfl:     divalproex sodium (DEPAKOTE) 500 mg EC tablet, Take 1,000 mg by mouth daily  , Disp: , Rfl:     docosanol (ABREVA) 10 %, Apply topically, Disp: , Rfl:     Docusate Sodium (COLACE PO), Take 100 mg by mouth 2 (two) times a day Hold one day for loose stools  , Disp: , Rfl:     ferrous sulfate 324 (65 Fe) mg, Take 325 mg by mouth 2 (two) times a day , Disp: , Rfl:     fexofenadine (ALLEGRA) 180 MG tablet, Take 180 mg by mouth as needed  , Disp: , Rfl:     hydrocortisone 1 % cream, Apply topically 2 (two) times a day, Disp: , Rfl:     Levothyroxine Sodium (SYNTHROID PO), Take 25 mcg by mouth daily  , Disp: , Rfl:     LORazepam (ATIVAN) 0 5 mg tablet, Take 0 5 mg by mouth 2 (two) times a day   , Disp: , Rfl:     MetFORMIN HCl (GLUCOPHAGE PO), Take 1,000 mg by mouth 2 (two) times a day , Disp: , Rfl:     miconazole (NEOSPORIN AF) 2 % cream, Apply topically 2 (two) times a day as needed, Disp: , Rfl:     nitrofurantoin (MACRODANTIN) 50 mg capsule, Take 1 capsule by mouth Bedtime  , Disp: , Rfl:     olopatadine HCl (PATADAY) 0 2 % opth drops, Administer 1 drop to both eyes as needed, Disp: , Rfl:     omeprazole (PriLOSEC) 20 mg delayed release capsule, Take 20 mg by mouth daily, Disp: , Rfl:     Oxybutynin Chloride (DITROPAN XL PO), Take 5 mg by mouth 3 (three) times a day   , Disp: , Rfl:     Polyethylene Glycol 3350 (MIRALAX PO), Take 17 g by mouth every morning  , Disp: , Rfl:     polyvinyl alcohol-povidone (REFRESH) 1 4-0 6 % ophthalmic solution, Apply to eye, Disp: , Rfl:     simvastatin (ZOCOR) 20 mg tablet, Take 20 mg by mouth , Disp: , Rfl:     TraZODone HCl (DESYREL PO), Take 100 mg by mouth daily at bedtime    , Disp: , Rfl:     Nutritional Supplements (BOOST PUDDING) PUDG, Take 1 Bottle by mouth 3 (three) times a day before meals for 30 days (Patient taking differently: Take 1 Bottle by mouth daily  ), Disp: 100 Can, Rfl: 3  Allergies   Allergen Reactions    No Active Allergies     Other      No known latex allergy       OBJECTIVE    Vitals:   Vitals:    04/19/18 1403   BP: 102/62   Pulse: 72   Resp: 16   Temp: (!) 95 4 °F (35 2 °C)   Weight: 52 5 kg (115 lb 12 8 oz)   Height: 4' 10" (1 473 m)           Physical Exam   Constitutional: She is oriented to person, place, and time  She appears well-developed and well-nourished  HENT:   Head: Normocephalic and atraumatic  Impacted cerumen removed with current patient tolerated procedure well   Eyes: Conjunctivae and EOM are normal  Pupils are equal, round, and reactive to light  Neck: Normal range of motion  Neck supple  Cardiovascular: Normal rate, regular rhythm, normal heart sounds and intact distal pulses  No murmur heard  Pulmonary/Chest: Effort normal and breath sounds normal  No respiratory distress  She has no wheezes  Abdominal: Soft  Bowel sounds are normal  She exhibits no distension  There is no tenderness  Musculoskeletal: Normal range of motion  She exhibits no edema  Neurological: She is alert and oriented to person, place, and time  Skin: Skin is warm and dry  No rash noted  Vitals reviewed           Angie Douglas MD, PGY-3  [facility]   4/19/2018

## 2018-04-30 ENCOUNTER — TELEPHONE (OUTPATIENT)
Dept: FAMILY MEDICINE CLINIC | Facility: CLINIC | Age: 58
End: 2018-04-30

## 2018-04-30 NOTE — TELEPHONE ENCOUNTER
[facility] mailed paper for Dr Caceres  to fill out for this pt, in her bin in triage

## 2018-05-03 DIAGNOSIS — M81.8 OTHER OSTEOPOROSIS WITHOUT CURRENT PATHOLOGICAL FRACTURE: Primary | ICD-10-CM

## 2018-05-08 ENCOUNTER — TELEPHONE (OUTPATIENT)
Dept: FAMILY MEDICINE CLINIC | Facility: CLINIC | Age: 58
End: 2018-05-08

## 2018-05-09 NOTE — TELEPHONE ENCOUNTER
Pharmacy calling to let us know that this pt was ordered Prolia and insurance does not cover and suggests use of either fosamax or actonel  I am not sure these would be feesible options due to this pt condition  Please advise if PA should be requested   Thank you

## 2018-05-22 ENCOUNTER — TELEPHONE (OUTPATIENT)
Dept: FAMILY MEDICINE CLINIC | Facility: CLINIC | Age: 58
End: 2018-05-22

## 2018-05-24 ENCOUNTER — OFFICE VISIT (OUTPATIENT)
Dept: NEUROLOGY | Facility: CLINIC | Age: 58
End: 2018-05-24
Payer: COMMERCIAL

## 2018-05-24 VITALS
HEART RATE: 76 BPM | SYSTOLIC BLOOD PRESSURE: 107 MMHG | RESPIRATION RATE: 14 BRPM | WEIGHT: 111 LBS | BODY MASS INDEX: 23.2 KG/M2 | DIASTOLIC BLOOD PRESSURE: 72 MMHG

## 2018-05-24 DIAGNOSIS — R25.2 SPASTICITY AS LATE EFFECT OF CEREBROVASCULAR ACCIDENT (CVA): ICD-10-CM

## 2018-05-24 DIAGNOSIS — I69.398 SPASTICITY AS LATE EFFECT OF CEREBROVASCULAR ACCIDENT (CVA): ICD-10-CM

## 2018-05-24 DIAGNOSIS — R26.9 GAIT DISTURBANCE: Primary | ICD-10-CM

## 2018-05-24 DIAGNOSIS — F79 INTELLECTUAL DISABILITY: ICD-10-CM

## 2018-05-24 DIAGNOSIS — R41.82 ALTERED MENTAL STATUS, UNSPECIFIED ALTERED MENTAL STATUS TYPE: ICD-10-CM

## 2018-05-24 PROBLEM — R30.0 DIFFICULT OR PAINFUL URINATION: Status: ACTIVE | Noted: 2017-03-06

## 2018-05-24 PROBLEM — H57.89 EYE REDNESS: Status: ACTIVE | Noted: 2017-06-29

## 2018-05-24 PROBLEM — J30.2 SEASONAL ALLERGIES: Status: ACTIVE | Noted: 2017-06-29

## 2018-05-24 PROBLEM — G62.9 PERIPHERAL NEUROPATHY: Status: ACTIVE | Noted: 2017-09-18

## 2018-05-24 PROBLEM — H61.20 IMPACTED CERUMEN: Status: ACTIVE | Noted: 2017-04-21

## 2018-05-24 PROBLEM — R77.0 ABNORMAL ALBUMIN: Status: ACTIVE | Noted: 2017-09-18

## 2018-05-24 PROBLEM — R53.81 PHYSICAL DECONDITIONING: Status: ACTIVE | Noted: 2017-05-09

## 2018-05-24 PROCEDURE — 99214 OFFICE O/P EST MOD 30 MIN: CPT | Performed by: PSYCHIATRY & NEUROLOGY

## 2018-05-24 RX ORDER — BACLOFEN 10 MG/1
10 TABLET ORAL 2 TIMES DAILY
Qty: 60 TABLET | Refills: 5 | Status: SHIPPED | OUTPATIENT
Start: 2018-05-24 | End: 2018-09-14 | Stop reason: SDUPTHER

## 2018-05-24 NOTE — PROGRESS NOTES
Patient ID: Ibis Barragan is a 56 y o  female  Assessment/Plan:       Problem List Items Addressed This Visit        Other    Severe Intellectual disability    Altered mental status, unspecified    Gait disturbance - Primary      Other Visit Diagnoses     Spasticity as late effect of cerebrovascular accident (CVA)        Relevant Medications    baclofen 10 mg tablet            Discussion Summary:    Patient is more alert and less debilitated than I have seen her in some time making it easier to clearly evaluate her gait disturbance  Though she was not as weak as in previous visits, she did tend to lean to right even more in walking than when I saw her last  Patient has left spastic hemiparesis and her caregiver thought maybe reducing the spasticity could be helpful  I started the patient on Baclofen 10 mg BID  If the spasticity on the left is partly responsible for her leaning, it may help  If she relies on the spasticity to stay upright and for ambulation, then that could get worse, but her caregiver is very attentive and would let me know if she worsened  She last had in home physical therapy in February and there may not be coverage for it again in several months  If Baclofen is helpful, then maybe adding physical therapy with Baclofen on board may be helpful  Subjective:    HPI    7 month follow-up for a 56year-old right-handed female with mental retardation whom I saw for the first time in January 2016 when she was referred by Dr Pauline Allison for change in mental status and new onset staring spells  Diana Daugherty note from 12/11/15 states that the patients caregiver had noted a slow change in mental status over a period of 6 months  Patient showed decreased activity, and less interest in group activities  Patient also has difficulty walking and had been leaning to right side  She had broken her left hip in July 2015   Patient had a dazed look, with a wide open mouth for several hours, though she was totally awake and responsive  When I saw the patient on 1/19/2016, patients gait and posture appeared to be related to discomfort in her left hip when seated and with weight bearing  She also had a Parkinsonian tremor  CT of the abdomen/pelvis/left hip and CT of LS spine were ordered  Previous CT head had shown evidence of both cortical and subcortical atrophy and cerebellar atrophy indicating that the patient had had long standing dementia and gait difficulties  Routine EEG was ordered to evaluate the staring spells  [facility]31/16, [facility] a [facility] and sigmoid volvulus  Patient had an emergent decompressive colonoscopy  Patient did not do well post colonoscopy and remained distended with hypotension  Patient was taken to the OR on 1/31/16 for an exploratory laparotomy, left sigmoidectomy, coloproctostomy, and diverting loop ileostomy that resulted in septic shock requiring intubation and critical care in the ICU  Her postoperative course was complicated by ileus  Urine cultures grew Klebsiella and patient was given antibiotics  Patient also developed thrombocytopenia  Renal was consulted during the admission for the patients hypernatremia, which is chronic, and was given D5W to keep up with her free fluid intake  Patient was discharged on 2/17/16  The patient was admitted to [facility] again from 2/20 through 2/24/16 with dehydration and hyperkalemia  EEG performed 3/23/16 was markedly abnormal due to continuous low amplitude background intermixed theta and delta slowing  No focal abnormalities or interictal epileptiform discharges were noted  No electrographic seizures occurred  Over a series of follow-up visits, it became apparent that the patient Libia Hillten would occur mostly when she was debilitated or ill, and were not typical of epileptic seizures  CT lumbar spine performed 3/25/16 showed no acute fracture or subluxation   Minimal lumbar spondylosis   localizer view demonstrates deformity of the left hip, similar to the previous 1/20/16 CT, possibly related to prior fracture and/or contracture  Dedicated left hip radiographs may be of additional use  When the patient was seen on 10/13/2016, her overall condition, including her gait, balance, and alertness had gradually improved since her discharge from the hospital with the help of physical therapy, but had not returned to baseline  When I saw the patient on 5/9/17, her gait, balance, and alertness had improved since her last visit which had been most likely due to her repeated admissions for bowel problems  Staring spells remained resolved when I saw the patient back on 11/14/17  She was able to walk on examination, although she leaned to the right  She had gained weight, although her caregivers were encouraged to increase her protein intake further  Patients albumin improved to 3 1 on 11/30/17  Last seen on 11/14/17  INTERVAL HISTORY:    AED level on 4/18/18  Valproic acid 43  Ammonia <10    Current AEDs:  Depakote 500 mg tablet, 2 tabs in AM (for bipolar disorder)  Lorazepam 0 5 mg tablet, 1 tab BID    Patient's nurse is present with the patient today  She states the patient is somewhat manic today and irritable  Her main concern is the patient's gait  The patient required a wheelchair in the office today although she typically uses a walker  It appears that the patient is stiff and she has fallen once  Her last therapy session was in February, but the patient becomes agitated during therapy  Patient leans to the right when she walks and the caregivers reports that when the patient becomes tired, the leaning is very significant  She also shuffles when she walks  Patient is currently off fosamax and she is going to start prolia soon        The following portions of the patient's history were reviewed and updated as appropriate: allergies, current medications, past family history, past medical history, past social history, past surgical history and problem list       Objective:    Blood pressure 107/72, pulse 76, resp  rate 14, weight 50 3 kg (111 lb), not currently breastfeeding  Physical Exam   Constitutional: She appears well-developed and well-nourished  HENT:   Head: Normocephalic and atraumatic  Eyes: EOM are normal  Pupils are equal, round, and reactive to light  Neck: Neck supple  Cardiovascular: Normal rate and regular rhythm  Pulmonary/Chest: Effort normal    Neurological: She is alert  Psychiatric:   Slightly agitated   Vitals reviewed  Neurological Exam    Mental Status  The patient is alert  She has poor knowledge  Moderate intellectual disability  Patient is able to speak one worded phrases and follows very simple commands  Cranial Nerves    CN II: The patient's visual acuity and visual fields are normal   CN III, IV, VI: The patient's pupils are equally round and reactive to light and ocular movements are normal   CN XII: The patient's tongue is right deviated  Motor   Her overall muscle tone is increased throughout  Specifically, the tone is spastic in the left arm,  She has abnormal movement  She has right-sided tremor and left-sided tremor  Gait and Coordination   She has a shortened stride  Her heel and toe walking is abnormal   Patient leans to the right when walking using a walker for ambulation  ROS:    Review of Systems   Constitutional: Negative  HENT: Negative  Eyes: Negative  Respiratory: Negative  Cardiovascular: Negative  Gastrointestinal: Negative  Endocrine: Negative  Genitourinary:        Loss of bladder control,    Musculoskeletal:        Joint pain, Muscle Pain, Pain when walking    Skin: Negative  Allergic/Immunologic: Negative  Neurological: Positive for tremors  Falls, Balance difficulties, Difficulty walking    Hematological: Negative      Psychiatric/Behavioral: Mood swings      Counseling Documentation:  Time in: 11:45, Time out: 12:05  Total time encounter was 20 minutes and 15 minutes were spent counseling the patient  Attestation:   By signing my name below, Chanel Núñez, attwellington that this documentation has been prepared under the direction and in the presence of Ghada Castellanos MD  Electronically Signed: Izabel Flores  05/24/18     I, Ghada Castellanos, personally performed the services described in this documentation  All medical record entries made by the scribe were at my direction and in my presence  I have reviewed the chart and discharge instructions and agree that the record reflects my personal performance and is accurate and complete    Ghada Castellanos MD  05/24/18

## 2018-05-24 NOTE — LETTER
May 25, 2018     Patricia Cordoba MD  [address] [facility] [address]    Patient: Nerissa Ramachandran   YOB: 1960   Date of Visit: 5/24/2018       Dear Dr Garcia Kid: Thank you for referring Stas Ham to me for evaluation  Below are my notes for this consultation  If you have questions, please do not hesitate to call me  I look forward to following your patient along with you  Sincerely,        Yovany Whyte MD        CC: No Recipients  Yovany Whyte MD  5/25/2018 11:38 AM  Signed  Patient ID: Nerissa Ramachandran is a 56 y o  female  Assessment/Plan:       Problem List Items Addressed This Visit        Other    Severe Intellectual disability    Altered mental status, unspecified    Gait disturbance - Primary      Other Visit Diagnoses     Spasticity as late effect of cerebrovascular accident (CVA)        Relevant Medications    baclofen 10 mg tablet            Discussion Summary:    Patient is more alert and less debilitated than I have seen her in some time making it easier to clearly evaluate her gait disturbance  Though she was not as weak as in previous visits, she did tend to lean to right even more in walking than when I saw her last  Patient has left spastic hemiparesis and her caregiver thought maybe reducing the spasticity could be helpful  I started the patient on Baclofen 10 mg BID  If the spasticity on the left is partly responsible for her leaning, it may help  If she relies on the spasticity to stay upright and for ambulation, then that could get worse, but her caregiver is very attentive and would let me know if she worsened  She last had in home physical therapy in February and there may not be coverage for it again in several months  If Baclofen is helpful, then maybe adding physical therapy with Baclofen on board may be helpful       Subjective:    HPI    7 month follow-up for a 56year-old right-handed female with mental retardation whom I saw for the first time in January 2016 when she was referred by Dr Christie Simmons for change in mental status and new onset staring spells  Beata Escalonafranck note from 12/11/15 states that the patients caregiver had noted a slow change in mental status over a period of 6 months  Patient showed decreased activity, and less interest in group activities  Patient also has difficulty walking and had been leaning to right side  She had broken her left hip in July 2015  Patient had a dazed look, with a wide open mouth for several hours, though she was totally awake and responsive  When I saw the patient on 1/19/2016, patients gait and posture appeared to be related to discomfort in her left hip when seated and with weight bearing  She also had a Parkinsonian tremor  CT of the abdomen/pelvis/left hip and CT of LS spine were ordered  Previous CT head had shown evidence of both cortical and subcortical atrophy and cerebellar atrophy indicating that the patient had had long standing dementia and gait difficulties  Routine EEG was ordered to evaluate the staring spells  On 1/31/16, patient was admitted to [facility] with a UTI and sigmoid volvulus  Patient had an emergent decompressive colonoscopy  Patient did not do well post colonoscopy and remained distended with hypotension  Patient was taken to the OR on 1/31/16 for an exploratory laparotomy, left sigmoidectomy, coloproctostomy, and diverting loop ileostomy that resulted in septic shock requiring intubation and critical care in the ICU  Her postoperative course was complicated by ileus  Urine cultures grew Klebsiella and patient was given antibiotics  Patient also developed thrombocytopenia  Renal was consulted during the admission for the patients hypernatremia, which is chronic, and was given D5W to keep up with her free fluid intake  Patient was discharged on 2/17/16      The patient was admitted to [facility] again from 2/20 through 2/24/16 with dehydration and hyperkalemia  EEG performed 3/23/16 was markedly abnormal due to continuous low amplitude background intermixed theta and delta slowing  No focal abnormalities or interictal epileptiform discharges were noted  No electrographic seizures occurred  Over a series of follow-up visits, it became apparent that the patient Rocio Acuna would occur mostly when she was debilitated or ill, and were not typical of epileptic seizures  CT lumbar spine performed 3/25/16 showed no acute fracture or subluxation  Minimal lumbar spondylosis   localizer view demonstrates deformity of the left hip, similar to the previous 1/20/16 CT, possibly related to prior fracture and/or contracture  Dedicated left hip radiographs may be of additional use  When the patient was seen on 10/13/2016, her overall condition, including her gait, balance, and alertness had gradually improved since her discharge from the hospital with the help of physical therapy, but had not returned to baseline  When I saw the patient on 5/9/17, her gait, balance, and alertness had improved since her last visit which had been most likely due to her repeated admissions for bowel problems  Staring spells remained resolved when I saw the patient back on 11/14/17  She was able to walk on examination, although she leaned to the right  She had gained weight, although her caregivers were encouraged to increase her protein intake further  Patients albumin improved to 3 1 on 11/30/17  Last seen on 11/14/17  INTERVAL HISTORY:    AED level on 4/18/18  Valproic acid 43  Ammonia <10    Current AEDs:  Depakote 500 mg tablet, 2 tabs in AM (for bipolar disorder)  Lorazepam 0 5 mg tablet, 1 tab BID    Patient's nurse is present with the patient today  She states the patient is somewhat manic today and irritable  Her main concern is the patient's gait  The patient required a wheelchair in the office today although she typically uses a walker   It appears that the patient is stiff and she has fallen once  Her last therapy session was in February, but the patient becomes agitated during therapy  Patient leans to the right when she walks and the caregivers reports that when the patient becomes tired, the leaning is very significant  She also shuffles when she walks  Patient is currently off fosamax and she is going to start prolia soon  The following portions of the patient's history were reviewed and updated as appropriate: allergies, current medications, past family history, past medical history, past social history, past surgical history and problem list       Objective:    Blood pressure 107/72, pulse 76, resp  rate 14, weight 50 3 kg (111 lb), not currently breastfeeding  Physical Exam   Constitutional: She appears well-developed and well-nourished  HENT:   Head: Normocephalic and atraumatic  Eyes: EOM are normal  Pupils are equal, round, and reactive to light  Neck: Neck supple  Cardiovascular: Normal rate and regular rhythm  Pulmonary/Chest: Effort normal    Neurological: She is alert  Psychiatric:   Slightly agitated   Vitals reviewed  Neurological Exam    Mental Status  The patient is alert  She has poor knowledge  Moderate intellectual disability  Patient is able to speak one worded phrases and follows very simple commands  Cranial Nerves    CN II: The patient's visual acuity and visual fields are normal   CN III, IV, VI: The patient's pupils are equally round and reactive to light and ocular movements are normal   CN XII: The patient's tongue is right deviated  Motor   Her overall muscle tone is increased throughout  Specifically, the tone is spastic in the left arm,  She has abnormal movement  She has right-sided tremor and left-sided tremor  Gait and Coordination   She has a shortened stride  Her heel and toe walking is abnormal   Patient leans to the right when walking using a walker for ambulation  ROS:    Review of Systems   Constitutional: Negative  HENT: Negative  Eyes: Negative  Respiratory: Negative  Cardiovascular: Negative  Gastrointestinal: Negative  Endocrine: Negative  Genitourinary:        Loss of bladder control,    Musculoskeletal:        Joint pain, Muscle Pain, Pain when walking    Skin: Negative  Allergic/Immunologic: Negative  Neurological: Positive for tremors  Falls, Balance difficulties, Difficulty walking    Hematological: Negative  Psychiatric/Behavioral:        Mood swings      Counseling Documentation:  Time in: 11:45, Time out: 12:05  Total time encounter was 20 minutes and 15 minutes were spent counseling the patient  Attestation:   By signing my name below, Honey Massielas, attest that this documentation has been prepared under the direction and in the presence of Elton Loo MD  Electronically Signed: Izabel Childs  05/24/18     I, Elton Loo, personally performed the services described in this documentation  All medical record entries made by the scribe were at my direction and in my presence  I have reviewed the chart and discharge instructions and agree that the record reflects my personal performance and is accurate and complete    Elton Loo MD  05/24/18

## 2018-05-25 ENCOUNTER — DOCUMENTATION (OUTPATIENT)
Dept: FAMILY MEDICINE CLINIC | Facility: CLINIC | Age: 58
End: 2018-05-25

## 2018-05-25 ENCOUNTER — HOSPITAL ENCOUNTER (OUTPATIENT)
Dept: RADIOLOGY | Age: 58
Discharge: HOME | End: 2018-05-25
Payer: COMMERCIAL

## 2018-05-25 DIAGNOSIS — Z12.31 SCREENING MAMMOGRAM, ENCOUNTER FOR: ICD-10-CM

## 2018-05-25 PROCEDURE — 77067 SCR MAMMO BI INCL CAD: CPT

## 2018-05-25 NOTE — TELEPHONE ENCOUNTER
Lisa from [facility], spoke to Nurse who instructed to DC Fosamax for Wednesday dosaging due to Prolia  Will need DC orders to be dated 05/17/18 and faxed to [phone]

## 2018-05-29 ENCOUNTER — CLINICAL SUPPORT (OUTPATIENT)
Dept: FAMILY MEDICINE CLINIC | Facility: CLINIC | Age: 58
End: 2018-05-29
Payer: COMMERCIAL

## 2018-05-29 DIAGNOSIS — M79.89 SWELLING OF LEFT LOWER EXTREMITY: Primary | ICD-10-CM

## 2018-05-29 DIAGNOSIS — M81.0 OSTEOPOROSIS WITHOUT CURRENT PATHOLOGICAL FRACTURE, UNSPECIFIED OSTEOPOROSIS TYPE: Primary | ICD-10-CM

## 2018-05-29 PROCEDURE — 96372 THER/PROPH/DIAG INJ SC/IM: CPT | Performed by: FAMILY MEDICINE

## 2018-05-29 NOTE — PROGRESS NOTES
Patient presents with caregivers for initial administration of Prolia  Her last dose of Fosamax was 5/16/18  Labs up to date, reviewed with Dr Sarah Vail  Reviewed dosing and side effects with caregivers

## 2018-05-29 NOTE — PROGRESS NOTES
Patient has been brought in for Prolia injection by the group home staff  They have noticed that she has had unilateral +1 edema that has been fluctuating the last couple weeks  The patient has been having poor ambulation secondary to this pain  The patient denies any pain  The group [place] reports no trauma  The patient was not fully examined at this visit  They will be bring her back in 2 days for an acute appointment  I have ordered a Doppler for the left lower extremity at this time

## 2018-05-30 ENCOUNTER — HOSPITAL ENCOUNTER (OUTPATIENT)
Dept: NON INVASIVE DIAGNOSTICS | Facility: CLINIC | Age: 58
Discharge: HOME | End: 2018-05-30
Payer: COMMERCIAL

## 2018-05-30 DIAGNOSIS — M79.89 SWELLING OF LEFT LOWER EXTREMITY: ICD-10-CM

## 2018-05-30 PROCEDURE — 93971 EXTREMITY STUDY: CPT | Performed by: SURGERY

## 2018-05-30 PROCEDURE — 93971 EXTREMITY STUDY: CPT

## 2018-05-31 ENCOUNTER — TELEPHONE (OUTPATIENT)
Dept: FAMILY MEDICINE CLINIC | Facility: CLINIC | Age: 58
End: 2018-05-31

## 2018-05-31 NOTE — TELEPHONE ENCOUNTER
Called group home with results  Call in morning to cancel appointment as requested by Dr Stephen Noel

## 2018-05-31 NOTE — PROGRESS NOTES
Can you please tell the group home that her results were negative for the vascular, and please cancel her appoinment for next Tuesday!  I can evaluate further at our appoinment at the end of the month

## 2018-05-31 NOTE — TELEPHONE ENCOUNTER
----- Message from Shira Ramsey MD sent at 5/31/2018 12:20 PM EDT -----  Can you please tell the group home that her results were negative for the vascular, and please cancel her appoinment for next Tuesday!  I can evaluate further at our appoinment at the end of the month

## 2018-06-01 NOTE — TELEPHONE ENCOUNTER
Cancelled appointment for Tuesday with Dr Kiana Pradhan, however pt did not already have an appt for the end of the month  Appt scheduled for 6/21 with Dr Kiana Pradhan

## 2018-06-06 ENCOUNTER — HOSPITAL ENCOUNTER (EMERGENCY)
Facility: HOSPITAL | Age: 58
Discharge: HOME | End: 2018-06-06
Attending: EMERGENCY MEDICINE | Admitting: EMERGENCY MEDICINE
Payer: COMMERCIAL

## 2018-06-06 ENCOUNTER — APPOINTMENT (EMERGENCY)
Dept: RADIOLOGY | Facility: HOSPITAL | Age: 58
End: 2018-06-06
Payer: COMMERCIAL

## 2018-06-06 VITALS
SYSTOLIC BLOOD PRESSURE: 121 MMHG | DIASTOLIC BLOOD PRESSURE: 78 MMHG | HEART RATE: 64 BPM | TEMPERATURE: 97.8 F | RESPIRATION RATE: 20 BRPM | OXYGEN SATURATION: 96 %

## 2018-06-06 DIAGNOSIS — S00.83XA HEMATOMA OF OCCIPITAL SURFACE OF HEAD: ICD-10-CM
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
## 2018-06-06 PROCEDURE — 90715 TDAP VACCINE 7 YRS/> IM: CPT | Performed by: EMERGENCY MEDICINE

## 2018-06-06 PROCEDURE — 72125 CT NECK SPINE W/O DYE: CPT

## 2018-06-06 PROCEDURE — 73522 X-RAY EXAM HIPS BI 3-4 VIEWS: CPT

## 2018-06-06 PROCEDURE — 99284 EMERGENCY DEPT VISIT MOD MDM: CPT

## 2018-06-06 PROCEDURE — 90471 IMMUNIZATION ADMIN: CPT

## 2018-06-06 PROCEDURE — 70450 CT HEAD/BRAIN W/O DYE: CPT

## 2018-06-06 RX ORDER — ACETAMINOPHEN 325 MG/1
650 TABLET ORAL ONCE
Status: COMPLETED | OUTPATIENT
Start: 2018-06-06 | End: 2018-06-06

## 2018-06-06 RX ADMIN — TETANUS TOXOID, REDUCED DIPHTHERIA TOXOID AND ACELLULAR PERTUSSIS VACCINE, ADSORBED 0.5 ML: 5; 2.5; 8; 8; 2.5 SUSPENSION INTRAMUSCULAR at 17:28

## 2018-06-06 RX ADMIN — ACETAMINOPHEN 650 MG: 325 TABLET ORAL at 17:28

## 2018-06-06 NOTE — ED PROVIDER NOTES
History  Chief Complaint   Patient presents with    Fall     nonverbal pt experienced mechanical fall at  [facility] while transferring to [facility]  (-) LOC  Small posterior head lac  HPI  53-year-old female brought in by EMS after a mechanical fall at her group home  Patient is nonverbal at baseline is post sees a walker  She fell backwards on her occiput  The small hematoma on her occiput  She is complaining of a pain to the back of her head as well as hip pain  She is not on any blood thinners  Impression and plan 53-year-old female status post fall at her group home min  She is nonverbal   She is nonfocal   Has a small hematoma to the occiput  Tender hips  Otherwise well-appearing  We will get CT head, CC on, plain film of the hips treat with Tylenol update tetanus reassess  Prior to Admission Medications   Prescriptions Last Dose Informant Patient Reported? Taking? ARIPiprazole (ABILIFY) 30 mg tablet 6/5/2018 at Unknown time Care Giver Yes Yes   Sig: Take 30 mg by mouth daily at bedtime  Cholecalciferol (VITAMIN D) 2000 UNITS tablet 6/6/2018 at Unknown time Care Giver Yes Yes   Sig: Take 2,000 Units by mouth daily  Citalopram Hydrobromide (CELEXA PO) 6/6/2018 at Unknown time Care Giver Yes Yes   Sig: Take 40 mg by mouth every morning     Diaper Rash Products (A+D PREVENT) OINT Past Week at Unknown time Care Giver Yes Yes   Sig: Apply topically   Docusate Sodium (COLACE PO) 6/6/2018 at Unknown time Care Giver Yes Yes   Sig: Take 100 mg by mouth 2 (two) times a day Hold one day for loose stools  LORazepam (ATIVAN) 0 5 mg tablet 6/6/2018 at Unknown time Care Giver Yes Yes   Sig: Take 0 5 mg by mouth 2 (two) times a day  Levothyroxine Sodium (SYNTHROID PO) 6/6/2018 at Unknown time Care Giver Yes Yes   Sig: Take 25 mcg by mouth daily  MetFORMIN HCl (GLUCOPHAGE PO) 6/6/2018 at Unknown time Care Giver Yes Yes   Sig: Take 1,000 mg by mouth 2 (two) times a day     Oxybutynin Chloride (DITROPAN XL PO) 6/6/2018 at Unknown time Care Giver Yes Yes   Sig: Take 5 mg by mouth 3 (three) times a day  Polyethylene Glycol 3350 (MIRALAX PO) 6/6/2018 at Unknown time Care Giver Yes Yes   Sig: Take 17 g by mouth every morning     TraZODone HCl (DESYREL PO) 6/5/2018 at Unknown time Care Giver Yes Yes   Sig: Take 100 mg by mouth daily at bedtime  baclofen 10 mg tablet 6/6/2018 at Unknown time  No Yes   Sig: Take 1 tablet (10 mg total) by mouth 2 (two) times a day   calcium carbonate 1250 MG capsule 6/5/2018 at Unknown time Care Giver Yes Yes   Sig: Take 500 mg by mouth daily     carbamide peroxide (DEBROX) 6 5 % otic solution Past Week at Unknown time Care Giver Yes Yes   Sig: Administer into ears   denosumab (PROLIA) 60 mg/mL   No No   Sig: Inject 1 mL (60 mg total) under the skin once for 1 dose   divalproex sodium (DEPAKOTE) 500 mg EC tablet 6/6/2018 at Unknown time Care Giver Yes Yes   Sig: Take 1,000 mg by mouth daily  docosanol (ABREVA) 10 % Past Week at Unknown time Care Giver Yes Yes   Sig: Apply topically   ferrous sulfate 324 (65 Fe) mg 6/6/2018 at Unknown time Care Giver Yes Yes   Sig: Take 325 mg by mouth 2 (two) times a day  fexofenadine (ALLEGRA) 180 MG tablet Past Week at Unknown time Care Giver Yes Yes   Sig: Take 180 mg by mouth as needed     hydrocortisone 1 % cream Past Week at Unknown time Care Giver Yes Yes   Sig: Apply topically 2 (two) times a day   miconazole (NEOSPORIN AF) 2 % cream Past Week at Unknown time Care Giver Yes Yes   Sig: Apply topically 2 (two) times a day as needed   nitrofurantoin (MACRODANTIN) 50 mg capsule 6/5/2018 at Unknown time Care Giver Yes Yes   Sig: Take 1 capsule by mouth Bedtime      olopatadine HCl (PATADAY) 0 2 % opth drops Past Week at Unknown time Care Giver Yes Yes   Sig: Administer 1 drop to both eyes as needed   omeprazole (PriLOSEC) 20 mg delayed release capsule 6/6/2018 at Unknown time Care Giver Yes Yes   Sig: Take 20 mg by mouth daily   polyvinyl alcohol-povidone (REFRESH) 1 4-0 6 % ophthalmic solution Past Week at Unknown time Care Giver Yes Yes   Sig: Apply to eye   simvastatin (ZOCOR) 20 mg tablet 6/5/2018 at Unknown time Care Giver Yes Yes   Sig: Take 20 mg by mouth  Facility-Administered Medications: None       Past Medical History:   Diagnosis Date    Adjustment disorder     Anemia     Bipolar 1 disorder (HCC)     Cerebral palsy (HCC)     Closed left hip fracture (HCC)     no surgery    Constipation     Diabetes mellitus ([facility] [address] )     Disease of thyroid gland     Diverticulosis     Fracture of multiple ribs of right side     Hip fracture (HCC) 07/26/2015    left    Hyperlipidemia     Hypotension     Impulse control disorder     Incontinence     Intellectual disability due to developmental disorder, unspecified     Microalbuminuria     Osteopathia     Osteoporosis     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[facility])        Past Surgical History:   Procedure Laterality Date    ABDOMINAL SURGERY      COLECTOMY MIN      re-anastomosis 7/22/16    COLONOSCOPY N/A 7/21/2016    Procedure: COLONOSCOPY;  Surgeon: Catheline Favre, MD;  [facility]; Service:     COLONOSCOPY N/A 1/31/2016    Procedure: COLONOSCOPY;  Surgeon: Catheline Favre, MD;  [facility];  Service:     COLONOSCOPY N/A 7/25/2017    Procedure: COLONOSCOPY;  Surgeon: Catheline Favre, MD;  [facility];   Service: Colorectal    COLOSTOMY      EXPLORATORY LAPAROTOMY W/ BOWEL RESECTION N/A 1/31/2016    Procedure: exploratory laparotomy, left sigmoidectomy, coloproctostomy, take down splenic flexure, loop colostomy;  Surgeon: Catheline Favre, MD;  [facility];  Service:     UT CLOSE ENTEROSTOMY N/A 7/22/2016    Procedure: SEGMENTAL COLECTOMY WITH COLOCOLOSTOMY;  Surgeon: Catheline Favre, MD;  [facility];  Service: Colorectal       Family History   Problem Relation Age of Onset    Family history unknown: Yes     I have reviewed and agree with the history as documented  Social History   Substance Use Topics    Smoking status: Never Smoker    Smokeless tobacco: Never Used    Alcohol use No        Review of Systems   Unable to perform ROS: Patient nonverbal       Physical Exam  ED Triage Vitals [06/06/18 1544]   Temperature Pulse Respirations Blood Pressure SpO2   97 8 °F (36 6 °C) 67 20 122/78 96 %      Temp Source Heart Rate Source Patient Position - Orthostatic VS BP Location FiO2 (%)   Oral Monitor Lying Right arm --      Pain Score       No Pain           Orthostatic Vital Signs  Vitals:    06/06/18 1544 06/06/18 1727   BP: 122/78 121/78   Pulse: 67 64   Patient Position - Orthostatic VS: Lying Lying       Physical Exam   Constitutional: She is oriented to person, place, and time  She appears well-developed and well-nourished  No distress  HENT:   Head: Normocephalic and atraumatic  Right Ear: No hemotympanum  Left Ear: No hemotympanum  Nose: Nose normal  No nasal septal hematoma  Mouth/Throat: Uvula is midline, oropharynx is clear and moist and mucous membranes are normal  She does not have dentures  No oropharyngeal exudate  Eyes: Conjunctivae and EOM are normal  Pupils are equal, round, and reactive to light  Right eye exhibits no discharge  Left eye exhibits no discharge  Right eye exhibits no nystagmus  Left eye exhibits no nystagmus  Neck: Trachea normal, normal range of motion, full passive range of motion without pain and phonation normal  Neck supple  No JVD present  No tracheal tenderness present  No tracheal deviation present  No thyromegaly present  No c-spine tenderness  c collar in place   Cardiovascular: Normal rate, regular rhythm, normal heart sounds and intact distal pulses  Exam reveals no gallop and no friction rub  No murmur heard  Pulmonary/Chest: Effort normal and breath sounds normal  No stridor  No respiratory distress  She has no wheezes  She has no rales  She exhibits no tenderness     Abdominal: Soft  Bowel sounds are normal  She exhibits no distension and no mass  There is no tenderness  There is no rebound and no guarding  No hernia  Musculoskeletal: Normal range of motion  She exhibits no edema, tenderness or deformity  No t, l spine tnedenress  No stpeoff/deformity  L/R hip tendernes  Normal rom  No ecchymosis  Stable pelvis   Lymphadenopathy:     She has no cervical adenopathy  Neurological: She is alert and oriented to person, place, and time  She has normal strength  No cranial nerve deficit or sensory deficit  GCS eye subscore is 4  GCS verbal subscore is 5  GCS motor subscore is 6  Reflex Scores:       Patellar reflexes are 2+ on the right side and 2+ on the left side  Achilles reflexes are 2+ on the right side and 2+ on the left side  nonfocal  At baseline mental status   Skin: Skin is warm and dry  Capillary refill takes less than 2 seconds  No rash noted  She is not diaphoretic  Psychiatric: She has a normal mood and affect  Nursing note and vitals reviewed  ED Medications  Medications   acetaminophen (TYLENOL) tablet 650 mg (650 mg Oral Given 6/6/18 1728)   tetanus-diphtheria-acellular pertussis (BOOSTRIX) IM injection 0 5 mL (0 5 mL Intramuscular Given 6/6/18 1728)       Diagnostic Studies  Results Reviewed     None                 XR hips bilateral 3-4 vw w pelvis if performed   ED Interpretation by Mauri Garcia MD (06/06 1731)   No acute findings      Final Result by Nikolas Correa MD (06/06 1755)      Unremarkable hips and pelvis  Workstation performed: [id number]         CT cervical spine without contrast   Final Result by Ida Garcia MD (06/06 1721)       No cervical spine fracture or traumatic malalignment  Workstation performed: [id number]         CT head without contrast   Final Result by Ida Garcia MD (06/06 1714)      No acute intracranial abnormality                    Workstation performed: [id number] Procedures  Procedures      Phone Consults  ED Phone Contact    ED Course  ED Course as of Jun 09 1751 Wed Jun 06, 2018   1751 C-collar cleared by myself at bedside    1756 We will dc  Return precautions disucssed  MDM  CritCare Time    Disposition  Final diagnoses:   Accident due to mechanical fall without injury   Hematoma of occipital surface of head     Time reflects when diagnosis was documented in both MDM as applicable and the Disposition within this note     Time User Action Codes Description Comment    6/6/2018  5:57 PM [facility] Add Jazmín Neil  XXXA] Accident due to mechanical fall without injury     6/6/2018  5:57 PM Myra GARCIA Add [[id number]] Hematoma of occipital surface of head       ED Disposition     ED Disposition Condition Comment    Discharge  Artur Montana discharge to home/self care  Condition at discharge: Good        Follow-up Information     Follow up With Specialties Details Why Contact Info Additional [address] Qu[address]ano MD Family Medicine, Obstetrics and Gynecology, Obstetrics, Gynecology In 2 days  [address] [facility] [address] [id number]       [facility] [address] Emergency Department Emergency Medicine  If symptoms worsen [address]  [phone]  ED, [address] [facility] [address], [place], [address] [facility], [address]          Discharge Medication List as of 6/6/2018  6:01 PM      CONTINUE these medications which have NOT CHANGED    Details   ARIPiprazole (ABILIFY) 30 mg tablet Take 30 mg by mouth daily at bedtime  , Historical Med      baclofen 10 mg tablet Take 1 tablet (10 mg total) by mouth 2 (two) times a day, Starting Thu 5/24/2018, Normal      calcium carbonate 1250 MG capsule Take 500 mg by mouth daily  , Historical Med      carbamide peroxide (DEBROX) 6 5 % otic solution Administer into ears, Starting Fri 4/21/2017, Historical Med      Cholecalciferol (VITAMIN D) 2000 UNITS tablet Take 2,000 Units by mouth daily  , Historical Med      Citalopram Hydrobromide (CELEXA PO) Take 40 mg by mouth every morning  , Historical Med      denosumab (PROLIA) 60 mg/mL Inject 1 mL (60 mg total) under the skin once for 1 dose, Starting Thu 5/3/2018, Normal      Diaper Rash Products (A+D PREVENT) OINT Apply topically, Starting Mon 5/22/2017, Historical Med      divalproex sodium (DEPAKOTE) 500 mg EC tablet Take 1,000 mg by mouth daily  , Historical Med      docosanol (ABREVA) 10 % Apply topically, Starting Thu 5/8/2014, Historical Med      Docusate Sodium (COLACE PO) Take 100 mg by mouth 2 (two) times a day Hold one day for loose stools  , Historical Med      ferrous sulfate 324 (65 Fe) mg Take 325 mg by mouth 2 (two) times a day , Historical Med      fexofenadine (ALLEGRA) 180 MG tablet Take 180 mg by mouth as needed  , Historical Med      hydrocortisone 1 % cream Apply topically 2 (two) times a day, Starting Mon 6/2/2014, Historical Med      Levothyroxine Sodium (SYNTHROID PO) Take 25 mcg by mouth daily  , Historical Med      LORazepam (ATIVAN) 0 5 mg tablet Take 0 5 mg by mouth 2 (two) times a day   , Historical Med      MetFORMIN HCl (GLUCOPHAGE PO) Take 1,000 mg by mouth 2 (two) times a day , Historical Med      miconazole (NEOSPORIN AF) 2 % cream Apply topically 2 (two) times a day as needed, Historical Med      nitrofurantoin (MACRODANTIN) 50 mg capsule Take 1 capsule by mouth Bedtime   , Starting Mon 4/11/2016, Historical Med      olopatadine HCl (PATADAY) 0 2 % opth drops Administer 1 drop to both eyes as needed, Historical Med      omeprazole (PriLOSEC) 20 mg delayed release capsule Take 20 mg by mouth daily, Historical Med      Oxybutynin Chloride (DITROPAN XL PO) Take 5 mg by mouth 3 (three) times a day   , Historical Med      Polyethylene Glycol 3350 (MIRALAX PO) Take 17 g by mouth every morning  , Historical Med      polyvinyl alcohol-povidone (REFRESH) 1 4-0 6 % ophthalmic solution Apply to eye, Historical Med      simvastatin (ZOCOR) 20 mg tablet Take 20 mg by mouth , Historical Med      TraZODone HCl (DESYREL PO) Take 100 mg by mouth daily at bedtime  , Historical Med      acetaminophen (TYLENOL) 325 mg tablet Take by mouth, Starting u 8/28/2014, Historical Med      Nutritional Supplements (BOOST PUDDING) PUDG Take 1 Bottle by mouth 3 (three) times a day before meals for 30 days, Starting 3/29/2017, Until Fri 4/28/17, Print           No discharge procedures on file  ED Provider  Attending physically available and evaluated Epiandre Marilyn HARRIS managed the patient along with the ED Attending      Electronically Signed by         Luz Reyes MD  06/09/18 8931

## 2018-06-06 NOTE — ED ATTENDING ATTESTATION
Arvind Garsia MD, saw and evaluated the patient  I have discussed the patient with the resident/non-physician practitioner and agree with the resident's/non-physician practitioner's findings, Plan of Care, and MDM as documented in the resident's/non-physician practitioner's note, except where noted  All available labs and Radiology studies were reviewed  At this point I agree with the current assessment done in the Emergency Department    I have conducted an independent evaluation of this patient a history and physical is as follows:    Here after a mechanical fall   Hit head on back  No anticoagulants   Pt is nonverbal at baseline  Exam no acute distress awake alert HEENT tenderness over the occiput no skull deformity noted neck nontender lungs clear chest wall nontender abdomen soft nontender  Neurologic exam moves all extremities purposely  Plan  CT head and C-spine  Critical Care Time  CritCare Time    Procedures

## 2018-06-06 NOTE — DISCHARGE INSTRUCTIONS
It is ok for the patient to skip her 4pm meds  She can return to rehab tomorrow  She needs to be under constant supervision during physical therapy and rehab  Fall Prevention   WHAT YOU NEED TO KNOW:   What is fall prevention? Fall prevention includes ways to make your home and other areas safer  It also includes ways you can move more carefully to prevent a fall  What increases my risk for falls? · Lack of vitamin D    · Not getting enough sleep each night    · Trouble walking or keeping your balance, or foot problems    · Health conditions that cause changes in your blood pressure, vision, or muscle strength and coordination    · Medicines that make you dizzy, weak, or sleepy    · Problems seeing clearly    · Shoes that have high heels or are not supportive    · Tripping hazards, such as items left on the floor, no handrails on the stairs, or broken steps  How can I help protect myself from falls? · Stand or sit up slowly  This may help you keep your balance and prevent falls  If you need to get up during the night, sit up first  Be sure you are fully awake before you stand  Turn on the light before you start walking  Go slowly in case you are still sleepy  Make sure you will not trip over any pets sleeping in the bedroom  · Use assistive devices as directed  Your healthcare provider may suggest that you use a cane or walker to help you keep your balance  You may need to have grab bars put in your bathroom near the toilet or in the shower  · Wear shoes that fit well and have soles that   Wear shoes both inside and outside  Use slippers with good   Do not wear shoes with high heels  · Wear a personal alarm  This is a device that allows you to call 911 if you fall and need help  Ask your healthcare provider for more information  · Stay active  Exercise can help strengthen your muscles and improve your balance  Your healthcare provider may recommend water aerobics or walking   He or she may also recommend physical therapy to improve your coordination  Never start an exercise program without talking to your healthcare provider first      · Manage medical conditions  Keep all appointments with your healthcare providers  Visit your eye doctor as directed  How can I make my home safer? · Add items to prevent falls in the bathroom  Put nonslip strips on your bath or shower floor to prevent you from slipping  Use a bath mat if you do not have carpet in the bathroom  This will prevent you from falling when you step out of the bath or shower  Use a shower seat so you do not need to stand while you shower  Sit on the toilet or a chair in your bathroom to dry yourself and put on clothing  This will prevent you from losing your balance from drying or dressing yourself while you are standing  · Keep paths clear  Remove books, shoes, and other objects from walkways and stairs  Place cords for telephones and lamps out of the way so that you do not need to walk over them  Tape them down if you cannot move them  Remove small rugs  If you cannot remove a rug, secure it with double-sided tape  This will prevent you from tripping  · Install bright lights in your home  Use night lights to help light paths to the bathroom or kitchen  Always turn on the light before you start walking  · Keep items you use often on shelves within reach  Do not use a step stool to help you reach an item  · Paint or place reflective tape on the edges of your stairs  This will help you see the stairs better  Call 911 or have someone else call if:   · You have fallen and are unconscious  · You have fallen and cannot move part of your body  Contact your healthcare provider if:   · You have fallen and have pain or a headache  · You have questions or concerns about your condition or care  CARE AGREEMENT:   You have the right to help plan your care   Learn about your health condition and how it may be treated  Discuss treatment options with your caregivers to decide what care you want to receive  You always have the right to refuse treatment  The above information is an  only  It is not intended as medical advice for individual conditions or treatments  Talk to your doctor, nurse or pharmacist before following any medical regimen to see if it is safe and effective for you  © 2017 [address] [employer] Information is for End User's use only and may not be sold, redistributed or otherwise used for commercial purposes  All illustrations and images included in CareNotes® are the copyrighted property of [employer]  or Thad Jackson

## 2018-06-08 ENCOUNTER — OFFICE VISIT (OUTPATIENT)
Dept: FAMILY MEDICINE CLINIC | Facility: CLINIC | Age: 58
End: 2018-06-08
Payer: COMMERCIAL

## 2018-06-08 VITALS
SYSTOLIC BLOOD PRESSURE: 110 MMHG | HEIGHT: 58 IN | WEIGHT: 110.8 LBS | RESPIRATION RATE: 16 BRPM | DIASTOLIC BLOOD PRESSURE: 76 MMHG | BODY MASS INDEX: 23.26 KG/M2 | TEMPERATURE: 95.6 F | HEART RATE: 72 BPM

## 2018-06-08 DIAGNOSIS — M16.0 OSTEOARTHRITIS OF BOTH HIPS, UNSPECIFIED OSTEOARTHRITIS TYPE: ICD-10-CM
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
## 2018-06-08 DIAGNOSIS — R26.9 GAIT ABNORMALITY: ICD-10-CM

## 2018-06-08 DIAGNOSIS — R26.9 NEUROLOGIC GAIT DYSFUNCTION: Primary | ICD-10-CM
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
## 2018-06-08 PROCEDURE — 99213 OFFICE O/P EST LOW 20 MIN: CPT | Performed by: FAMILY MEDICINE

## 2018-06-08 RX ORDER — ACETAMINOPHEN 325 MG/1
650 TABLET ORAL EVERY 6 HOURS PRN
Qty: 240 TABLET | Refills: 11 | Status: SHIPPED | OUTPATIENT
Start: 2018-06-08 | End: 2018-06-08 | Stop reason: SDUPTHER

## 2018-06-08 RX ORDER — ACETAMINOPHEN 325 MG/1
650 TABLET ORAL EVERY 6 HOURS PRN
Qty: 240 TABLET | Refills: 0 | Status: SHIPPED | OUTPATIENT
Start: 2018-06-08 | End: 2021-06-11

## 2018-06-08 NOTE — ASSESSMENT & PLAN NOTE
- patient has CP with abnormal gait, currently uses rolling walker   - will prescribe PT [facility] services

## 2018-06-08 NOTE — ASSESSMENT & PLAN NOTE
-patient is here for ER follow up for fall  - patient has no residual symptoms, small posterior laceration healing   - CT head and cervical spine in ER unremarkable, reviewed by myself  - follow up in 2 weeks with Dr Juana Shah

## 2018-06-08 NOTE — PROGRESS NOTES
Assessment/Plan     Fall  -patient is here for ER follow up for fall  - patient has no residual symptoms, small posterior laceration healing   - CT head and cervical spine in ER unremarkable, reviewed by myself  - follow up in 2 weeks with Dr Jenise Vazquez  Gait abnormality  - patient has CP with abnormal gait, currently uses rolling walker   - will prescribe PT [facility] services     Osteoarthritis of both hips  - continue tylenol 650 mg q 6 hrs prn   - will order home PT throught [facility]  - bilateral mild osteoarthritis seen on recent hip xray      Diagnoses and all orders for this visit:        Osteoarthritis of both hips, unspecified osteoarthritis type  -     acetaminophen (TYLENOL) 325 mg tablet; Take 2 tablets (650 mg total) by mouth every 6 (six) hours as needed (pain, fever greater than 100 4)         Subjective     Chief Complaint: ER follow up for fall     HPI:   This is a 67 yo F who presents for ER follow up for mechanical fall  She was seen in ED on 6/6/18, with head injury and small posterior laceration  She has intellectual disability  She got up from a chair and then fell to the ground, nurse reports she does this frequently and "she is stubborn", however, they are usually able to catch her before she hits the ground  She had a CT head, cervical spine and Xray of bilateral hips were all unremarkable  The posterior head laceration is improving per the nurse  Nursing is monitoring with neuro checks  They denies patient has headache, somnolence, vomiting or fever  Nursing staff would like home PT script  She started prolia on may 29 th and baclofen on may 25th  She has a walker with a chronic unsteady gait         The following portions of the patient's history were reviewed and updated as appropriate: allergies, current medications, past family history, past medical history, past social history, past surgical history and problem list     Review of Systems  Review of Systems   Constitutional: Negative for fatigue and fever  HENT: Negative for congestion and rhinorrhea  As noted in HPI    Respiratory: Negative for cough and shortness of breath  Cardiovascular: Negative for chest pain and palpitations  Gastrointestinal: Negative for abdominal pain  Genitourinary: Negative for difficulty urinating  Musculoskeletal: Negative for back pain  Neurological: Negative for dizziness, light-headedness and headaches  Objective   Vitals:    06/08/18 0846   BP: 110/76   Pulse: 72   Resp: 16   Temp: (!) 95 6 °F (35 3 °C)       Physical Exam   Constitutional: She appears well-developed and well-nourished  HENT:   Right Ear: External ear normal    Left Ear: External ear normal    Mouth/Throat: Oropharynx is clear and moist    Small posterior head laceration with dried blood healing well    Eyes: Pupils are equal, round, and reactive to light  Neck: Neck supple  Cardiovascular: Normal rate, regular rhythm and normal heart sounds  Exam reveals no friction rub  No murmur heard  Pulmonary/Chest: Effort normal and breath sounds normal  No respiratory distress  She has no wheezes  Abdominal: Soft  Bowel sounds are normal  She exhibits no distension  There is no tenderness  Musculoskeletal:   Spasticity of bilateral hand    Neurological: She is alert  Patient nonverbal, unable to participate in orientation questions, no gross focal neural deficiets     Skin: Skin is warm and dry  Lab Results: I have reviewed all the lab results

## 2018-06-19 LAB
LEFT EYE DIABETIC RETINOPATHY: NORMAL
RIGHT EYE DIABETIC RETINOPATHY: NORMAL

## 2018-06-26 ENCOUNTER — OFFICE VISIT (OUTPATIENT)
Dept: FAMILY MEDICINE CLINIC | Facility: CLINIC | Age: 58
End: 2018-06-26
Payer: COMMERCIAL

## 2018-06-26 VITALS
SYSTOLIC BLOOD PRESSURE: 102 MMHG | BODY MASS INDEX: 22.99 KG/M2 | HEART RATE: 76 BPM | WEIGHT: 110 LBS | RESPIRATION RATE: 14 BRPM | TEMPERATURE: 97.9 F | DIASTOLIC BLOOD PRESSURE: 76 MMHG

## 2018-06-26 DIAGNOSIS — E11.9 TYPE 2 DIABETES MELLITUS WITHOUT COMPLICATION, WITHOUT LONG-TERM CURRENT USE OF INSULIN (HCC): ICD-10-CM

## 2018-06-26 DIAGNOSIS — M81.0 OSTEOPOROSIS, UNSPECIFIED OSTEOPOROSIS TYPE, UNSPECIFIED PATHOLOGICAL FRACTURE PRESENCE: ICD-10-CM

## 2018-06-26 DIAGNOSIS — E78.5 HYPERLIPIDEMIA, UNSPECIFIED HYPERLIPIDEMIA TYPE: ICD-10-CM

## 2018-06-26 DIAGNOSIS — R26.9 GAIT DISTURBANCE: ICD-10-CM

## 2018-06-26 DIAGNOSIS — E03.9 HYPOTHYROIDISM, UNSPECIFIED TYPE: Primary | ICD-10-CM

## 2018-06-26 PROCEDURE — 99213 OFFICE O/P EST LOW 20 MIN: CPT | Performed by: FAMILY MEDICINE

## 2018-06-26 NOTE — PROGRESS NOTES
Joycelyn Cheney 1960 female MRN: [id number]    Family Medicine Follow-up Visit    ASSESSMENT/PLAN    Joycelyn Cheney is a 56 y o  female with significant PmhX of  Severe Intellectual disability,adjustment disorder, Bipolar type 1 diabetes type 2, hypertension, hyperlipidemia, ambulatory dysfunction presenting to the office for      1  Osteoporosis:  -  DEXA scan performed on 01/18 showed improvement in density however given patient's severe intellectual disability and history of GERD she was switched to Prolia  The patient has had no side effects with this injection  Per the caregiver they have discontinue the Fosmax a week prior to Prolia injection  2   Hyperlipidemia:  -  Recent fasting lipid panel on  showed a controlled fasting lipid panel   -  Continue on Simvastatin 20 mg daily     3  Hypothyroidism  - TSH 3 489 on April 2018  - continue levothyroxine 25 mcg daily     4  Type 2 diabetes controlled  - Continue metformin a 1000 BID  - Opthalmology: June 19 th  - Podiatrist: on May 12 th   - Foot exam: please see diabetic foot exam that was performed today  Patient demonstrated diminished sensation over the documented areas  Patient has evidence of peripheral neuropathy without callus formation She has a significant history of left footdrop, white foot deformity on the 4th digit, bilateral cyanosis  Sensitive to temperature, bilateral intermittent edema, bilateral CP  5  Gait disturbance:  - Continue Home Physical Therapy  -referral to [facility] for 2nd opinion after home physical therapy has been completed    6  Hypertension:  - at this time the patient has been controlled currently off medications  Recommend that we take  Monthly blood pressures  Please bring log at our next visit  Disposition: Return to the clinic in  12 weeks; long appointment needed      Health Maintence: 1  Colonoscopy: up-to-date performed on 7/15  2  Mammo:   Performed May 2018 within normal limits  3  Papsmear: (-) 10/30/17  4  DEXA:  Osteoporosis  Present on 01/19/2018  5  Ambulation: Use of Walker/ wheel chair  Patient will need aggressive physical therapy  Script for [employer] was given today for evaluation after   Home physical therapy is completed  6  PPD screening: Negative on 1/23/18; repeat in 2 years  7  Vaccine: Flu of 10/17; Tdap 2018       Future Appointments  Date Time Provider [place]   9/11/2018 8:30 AM Ana Valiente MD S [facility]   9/13/2018 10:30 AM Kerri Lowry MD NEURO [facility]          SUBJECTIVE  CC: Chrnic visit      HPI:  Glenny Beck is a 66 y o  female with significant PmHX severe Intellectual disability, Bipolar 1, Adjustment disorder,diabetes type 2, hypertension, hyperlipidemia, ambulatory dysfunction presenting to the office for 12 week follow up  Caregiver concerned that the patient's gait has been getting worse  She would like referral to [facility] if possible toda  The patient podiatrist/therapist feels that the patient would benefit from diabetic shoes given her profound physical findings  Patient taking her hypothyroidism/diabetes medications without any difficulties  Patient recently started taking Prolia for her osteoporosis without any complications  Review of Systems   Constitutional: Negative for fatigue and fever  HENT: Negative for congestion and rhinorrhea  As noted in HPI    Respiratory: Negative for cough and shortness of breath  Cardiovascular: Negative for chest pain and palpitations  Gastrointestinal: Negative for abdominal pain  Genitourinary: Negative for difficulty urinating  Musculoskeletal: Positive for gait problem  Negative for back pain  Neurological: Negative for dizziness, light-headedness and headaches         Historical Information   The patient history was reviewed as follows:    Past Medical History:   Diagnosis Date    Adjustment disorder     Anemia     Bipolar 1 disorder ([place] [address] )     Cerebral palsy ([place] [address] )     Closed left hip fracture (HCC)     no surgery    Constipation     Diabetes mellitus ([place] [address] )     Disease of thyroid gland     Diverticulosis     Fracture of multiple ribs of right side     Hip fracture ([place] [address] ) 07/26/2015    left    Hyperlipidemia     Hypotension     Impulse control disorder     Incontinence     Intellectual disability due to developmental disorder, unspecified     Microalbuminuria     Osteopathia     Osteoporosis     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HCC)      Past Surgical History:   Procedure Laterality Date    ABDOMINAL SURGERY      COLECTOMY MIN      re-anastomosis 7/22/16    COLONOSCOPY N/A 7/21/2016    Procedure: COLONOSCOPY;  Surgeon: Oswald Vogel MD;  [facility]; Service:     COLONOSCOPY N/A 1/31/2016    Procedure: COLONOSCOPY;  Surgeon: Oswald Vogel MD;  [facility];  Service:     COLONOSCOPY N/A 7/25/2017    Procedure: COLONOSCOPY;  Surgeon: Oswald Vogel MD;  [facility];   Service: Colorectal    COLOSTOMY      EXPLORATORY LAPAROTOMY W/ BOWEL RESECTION N/A 1/31/2016    Procedure: exploratory laparotomy, left sigmoidectomy, coloproctostomy, take down splenic flexure, loop colostomy;  Surgeon: Oswald Vogel MD;  [facility];  Service:     [place] CLOSE ENTEROSTOMY N/A 7/22/2016    Procedure: SEGMENTAL COLECTOMY WITH COLOCOLOSTOMY;  Surgeon: Oswald Vogel MD;  [facility];  Service: Colorectal     Family History   Problem Relation Age of Onset    Family history unknown: Yes      Social History   History   Alcohol Use No     History   Drug Use No     History   Smoking Status    Never Smoker   Smokeless Tobacco    Never Used       Medications:     Current Outpatient Prescriptions:     acetaminophen (TYLENOL) 325 mg tablet, Take 2 tablets (650 mg total) by mouth every 6 (six) hours as needed (pain, fever greater than 100 4), Disp: 240 tablet, Rfl: 0    ARIPiprazole (ABILIFY) 30 mg tablet, Take 30 mg by mouth daily at bedtime  , Disp: , Rfl:     baclofen 10 mg tablet, Take 1 tablet (10 mg total) by mouth 2 (two) times a day, Disp: 60 tablet, Rfl: 5    calcium carbonate 1250 MG capsule, Take 500 mg by mouth daily  , Disp: , Rfl:     carbamide peroxide (DEBROX) 6 5 % otic solution, Administer into ears, Disp: , Rfl:     Cholecalciferol (VITAMIN D) 2000 UNITS tablet, Take 2,000 Units by mouth daily  , Disp: , Rfl:     Citalopram Hydrobromide (CELEXA PO), Take 40 mg by mouth every morning  , Disp: , Rfl:     Diaper Rash Products (A+D PREVENT) OINT, Apply topically, Disp: , Rfl:     divalproex sodium (DEPAKOTE) 500 mg EC tablet, Take 1,000 mg by mouth daily  , Disp: , Rfl:     docosanol (ABREVA) 10 %, Apply topically, Disp: , Rfl:     Docusate Sodium (COLACE PO), Take 100 mg by mouth 2 (two) times a day Hold one day for loose stools  , Disp: , Rfl:     ferrous sulfate 324 (65 Fe) mg, Take 325 mg by mouth 2 (two) times a day , Disp: , Rfl:     fexofenadine (ALLEGRA) 180 MG tablet, Take 180 mg by mouth as needed  , Disp: , Rfl:     hydrocortisone 1 % cream, Apply topically 2 (two) times a day, Disp: , Rfl:     Levothyroxine Sodium (SYNTHROID PO), Take 25 mcg by mouth daily  , Disp: , Rfl:     LORazepam (ATIVAN) 0 5 mg tablet, Take 0 5 mg by mouth 2 (two) times a day   , Disp: , Rfl:     MetFORMIN HCl (GLUCOPHAGE PO), Take 1,000 mg by mouth 2 (two) times a day , Disp: , Rfl:     miconazole (NEOSPORIN AF) 2 % cream, Apply topically 2 (two) times a day as needed, Disp: , Rfl:     nitrofurantoin (MACRODANTIN) 50 mg capsule, Take 1 capsule by mouth Bedtime   , Disp: , Rfl:     olopatadine HCl (PATADAY) 0 2 % opth drops, Administer 1 drop to both eyes as needed, Disp: , Rfl:     omeprazole (PriLOSEC) 20 mg delayed release capsule, Take 20 mg by mouth daily, Disp: , Rfl:     Oxybutynin Chloride (DITROPAN XL PO), Take 5 mg by mouth 3 (three) times a day   , Disp: , Rfl:     Polyethylene Glycol 3350 (MIRALAX PO), Take 17 g by mouth every morning  , Disp: , Rfl:     polyvinyl alcohol-povidone (REFRESH) 1 4-0 6 % ophthalmic solution, Apply to eye, Disp: , Rfl:     simvastatin (ZOCOR) 20 mg tablet, Take 20 mg by mouth , Disp: , Rfl:     TraZODone HCl (DESYREL PO), Take 100 mg by mouth daily at bedtime  , Disp: , Rfl:     denosumab (PROLIA) 60 mg/mL, Inject 1 mL (60 mg total) under the skin once for 1 dose, Disp: 1 mL, Rfl: 0  No Known Allergies    OBJECTIVE    Vitals:   Vitals:    06/26/18 1322   BP: 102/76   Pulse: 76   Resp: 14   Temp: 97 9 °F (36 6 °C)   TempSrc: Tympanic   Weight: 49 9 kg (110 lb)           Physical Exam   Constitutional: She is oriented to person, place, and time  She appears well-developed and well-nourished  HENT:   Head: Normocephalic and atraumatic  Impacted cerumen removed with current patient tolerated procedure well   Eyes: Conjunctivae and EOM are normal  Pupils are equal, round, and reactive to light  Neck: Normal range of motion  Neck supple  Cardiovascular: Normal rate, regular rhythm, normal heart sounds and intact distal pulses  Pulses are no weak pulses  No murmur heard  Pulses:       Dorsalis pedis pulses are 2+ on the right side, and 2+ on the left side  Posterior tibial pulses are 2+ on the right side, and 2+ on the left side  Pulmonary/Chest: Effort normal and breath sounds normal  No respiratory distress  She has no wheezes  Abdominal: Soft  Bowel sounds are normal  She exhibits no distension  There is no tenderness  Musculoskeletal: Normal range of motion  She exhibits no edema  Feet:    B/l Rigidity of the lower extremites  Feet:   Right Foot:   Skin Integrity: Negative for ulcer, skin breakdown, erythema, warmth, callus or dry skin  Left Foot:   Skin Integrity: Negative for ulcer, skin breakdown, erythema, warmth, callus or dry skin  Neurological: She is alert and oriented to person, place, and time     Skin: Skin is warm and dry  No rash noted  Psychiatric:   Behavior at baseline   Vitals reviewed  Patient's shoes and socks removed  Right Foot/Ankle   Right Foot Inspection  Skin Exam: skin normal and skin intact no dry skin, no warmth, no callus, no erythema, no maceration, no abnormal color, no pre-ulcer, no ulcer and no callus                          Toe Exam: ROM and strength within normal limits and right toe deformity ( about half an inch shorter in comparison to her left foot)no swelling  Sensory       Monofilament testing: intact  Vascular  Capillary refills: < 3 seconds  The right DP pulse is 2+  The right PT pulse is 2+  Left Foot/Ankle  Left Foot Inspection  Skin Exam: skin normal and skin intactno dry skin, no warmth, no erythema, no maceration, normal color, no pre-ulcer, no ulcer and no callus                         Toe Exam: ROM and strength within normal limitsno swelling                   Sensory       Monofilament: intact  Vascular  Capillary refills: < 3 seconds  The left DP pulse is 2+  The left PT pulse is 2+  Assign Risk Category:  No deformity present; No loss of protective sensation;  No weak pulses       Risk: 0        Tracie Krishnan MD, PGY-3  [facility]  2

## 2018-06-29 ENCOUNTER — TELEPHONE (OUTPATIENT)
Dept: NEUROLOGY | Facility: CLINIC | Age: 58
End: 2018-06-29

## 2018-07-05 ENCOUNTER — APPOINTMENT (OUTPATIENT)
Dept: LAB | Facility: CLINIC | Age: 58
End: 2018-07-05
Payer: COMMERCIAL

## 2018-07-05 ENCOUNTER — TRANSCRIBE ORDERS (OUTPATIENT)
Dept: LAB | Facility: CLINIC | Age: 58
End: 2018-07-05

## 2018-07-05 DIAGNOSIS — F31.9 DEPRESSED BIPOLAR I DISORDER (HCC): ICD-10-CM

## 2018-07-05 DIAGNOSIS — F31.9 DEPRESSED BIPOLAR I DISORDER (HCC): Primary | ICD-10-CM

## 2018-07-05 LAB
ALBUMIN SERPL BCP-MCNC: 3.1 G/DL (ref 3.5–5)
ALP SERPL-CCNC: 46 U/L (ref 46–116)
ALT SERPL W P-5'-P-CCNC: 19 U/L (ref 12–78)
AMMONIA PLAS-SCNC: <10 UMOL/L (ref 11–35)
AST SERPL W P-5'-P-CCNC: 14 U/L (ref 5–45)
BASOPHILS # BLD MANUAL: 0 THOUSAND/UL (ref 0–0.1)
BASOPHILS NFR MAR MANUAL: 0 % (ref 0–1)
BILIRUB DIRECT SERPL-MCNC: 0.07 MG/DL (ref 0–0.2)
BILIRUB SERPL-MCNC: 0.2 MG/DL (ref 0.2–1)
EOSINOPHIL # BLD MANUAL: 0.46 THOUSAND/UL (ref 0–0.4)
EOSINOPHIL NFR BLD MANUAL: 8 % (ref 0–6)
ERYTHROCYTE [DISTWIDTH] IN BLOOD BY AUTOMATED COUNT: 12.6 % (ref 11.6–15.1)
HCT VFR BLD AUTO: 39.7 % (ref 34.8–46.1)
HGB BLD-MCNC: 12.9 G/DL (ref 11.5–15.4)
LYMPHOCYTES # BLD AUTO: 1.85 THOUSAND/UL (ref 0.6–4.47)
LYMPHOCYTES # BLD AUTO: 32 % (ref 14–44)
MCH RBC QN AUTO: 33.4 PG (ref 26.8–34.3)
MCHC RBC AUTO-ENTMCNC: 32.5 G/DL (ref 31.4–37.4)
MCV RBC AUTO: 103 FL (ref 82–98)
METAMYELOCYTES NFR BLD MANUAL: 2 % (ref 0–1)
MONOCYTES # BLD AUTO: 0.4 THOUSAND/UL (ref 0–1.22)
MONOCYTES NFR BLD: 7 % (ref 4–12)
MYELOCYTES NFR BLD MANUAL: 1 % (ref 0–1)
NEUTROPHILS # BLD MANUAL: 2.54 THOUSAND/UL (ref 1.85–7.62)
NEUTS BAND NFR BLD MANUAL: 2 % (ref 0–8)
NEUTS SEG NFR BLD AUTO: 42 % (ref 43–75)
PLATELET # BLD AUTO: 152 THOUSANDS/UL (ref 149–390)
PLATELET BLD QL SMEAR: ABNORMAL
PMV BLD AUTO: 12 FL (ref 8.9–12.7)
PROT SERPL-MCNC: 7.2 G/DL (ref 6.4–8.2)
RBC # BLD AUTO: 3.86 MILLION/UL (ref 3.81–5.12)
T3 SERPL-MCNC: 1 NG/ML (ref 0.6–1.8)
T4 SERPL-MCNC: 11.4 UG/DL (ref 4.7–13.3)
TOTAL CELLS COUNTED SPEC: 100
TSH SERPL DL<=0.05 MIU/L-ACNC: 2.96 UIU/ML (ref 0.36–3.74)
VARIANT LYMPHS # BLD AUTO: 6 %
WBC # BLD AUTO: 5.78 THOUSAND/UL (ref 4.31–10.16)

## 2018-07-05 PROCEDURE — 36415 COLL VENOUS BLD VENIPUNCTURE: CPT

## 2018-07-05 PROCEDURE — 80165 DIPROPYLACETIC ACID FREE: CPT

## 2018-07-05 PROCEDURE — 84480 ASSAY TRIIODOTHYRONINE (T3): CPT

## 2018-07-05 PROCEDURE — 85007 BL SMEAR W/DIFF WBC COUNT: CPT

## 2018-07-05 PROCEDURE — 82140 ASSAY OF AMMONIA: CPT

## 2018-07-05 PROCEDURE — 80076 HEPATIC FUNCTION PANEL: CPT

## 2018-07-05 PROCEDURE — 84436 ASSAY OF TOTAL THYROXINE: CPT

## 2018-07-05 PROCEDURE — 84443 ASSAY THYROID STIM HORMONE: CPT

## 2018-07-05 PROCEDURE — 85027 COMPLETE CBC AUTOMATED: CPT

## 2018-07-09 LAB — VALPROATE FREE SERPL-MCNC: 9.8 UG/ML (ref 6–22)

## 2018-09-05 ENCOUNTER — TELEPHONE (OUTPATIENT)
Dept: FAMILY MEDICINE CLINIC | Facility: CLINIC | Age: 58
End: 2018-09-05

## 2018-09-05 DIAGNOSIS — R25.2 SPASTICITY: Primary | ICD-10-CM

## 2018-09-05 DIAGNOSIS — G80.9 CEREBRAL PALSY, UNSPECIFIED TYPE (HCC): ICD-10-CM

## 2018-09-05 NOTE — TELEPHONE ENCOUNTER
Pt has been going to [facility] for P T , her therapist Saloni Huntley thinks she would benefit from O T   [address] [facility] Ph# [phone]    Lisa from [employer] asked if 's Order could be faxed to her at [phone]

## 2018-09-06 ENCOUNTER — OFFICE VISIT (OUTPATIENT)
Dept: FAMILY MEDICINE CLINIC | Facility: CLINIC | Age: 58
End: 2018-09-06
Payer: COMMERCIAL

## 2018-09-06 DIAGNOSIS — I10 ESSENTIAL HYPERTENSION: ICD-10-CM

## 2018-09-06 DIAGNOSIS — E11.9 TYPE 2 DIABETES MELLITUS WITHOUT COMPLICATION, WITHOUT LONG-TERM CURRENT USE OF INSULIN (HCC): ICD-10-CM

## 2018-09-06 DIAGNOSIS — Z00.00 ENCOUNTER FOR MEDICARE ANNUAL WELLNESS EXAM: Primary | ICD-10-CM

## 2018-09-06 DIAGNOSIS — Z11.59 NEED FOR HEPATITIS C SCREENING TEST: ICD-10-CM

## 2018-09-06 DIAGNOSIS — G80.9 CEREBRAL PALSY, UNSPECIFIED TYPE (HCC): ICD-10-CM

## 2018-09-06 DIAGNOSIS — R26.2 AMBULATORY DYSFUNCTION: ICD-10-CM

## 2018-09-06 PROBLEM — J18.9 HCAP (HEALTHCARE-ASSOCIATED PNEUMONIA): Status: RESOLVED | Noted: 2017-03-27 | Resolved: 2018-09-06

## 2018-09-06 PROCEDURE — G0439 PPPS, SUBSEQ VISIT: HCPCS | Performed by: FAMILY MEDICINE

## 2018-09-06 NOTE — PROGRESS NOTES
Venice Rodriguez 1960 female MRN: [id number]    Family Medicine Follow-up Visit    SUBJECTIVE    CC: Follow-up and Medicare Wellness Visit    HPI:  Venice Rodriguez is a 66 y o  female who presented for a follow-up of HPI    Follows with psychiatry, dermatology, neurology, ophthalmology, OB, dentistry  She got diabetic shoes recently  Does not need diabetic refills at this time  Psych has recently ordered labs and the caregiver is requesting additional labs: CK, CMP, HbA1c  No thyroid symptoms, recently checked TSH  Review of Systems   Constitutional: Negative for activity change, chills and fever  HENT: Negative for congestion, rhinorrhea and sore throat  Eyes: Negative for visual disturbance  Respiratory: Negative for cough, shortness of breath and wheezing  Cardiovascular: Negative for chest pain and palpitations  Gastrointestinal: Negative for abdominal pain, blood in stool, constipation, diarrhea, nausea and vomiting  Genitourinary: Negative for dysuria  Musculoskeletal: Positive for gait problem  Negative for arthralgias and myalgias  Skin: Negative for rash  Neurological: Negative for dizziness, weakness and headaches  Psychiatric/Behavioral: Negative for dysphoric mood and self-injury  The patient is not nervous/anxious  All other systems reviewed and are negative      Historical Information     The patient history was reviewed as follows:    Past Medical History:   Diagnosis Date    Adjustment disorder     Anemia     [facility] 1 disorder ([facility] )     Cerebral palsy (HCC)     Closed left hip fracture (HCC)     no surgery    Constipation     Diabetes mellitus ([facility] )     Disease of thyroid gland     Diverticulosis     Fracture of multiple ribs of right side     Hip fracture ([facility] ) 07/26/2015    left    Hyperlipidemia     Hypotension     Impulse control disorder     Incontinence     Intellectual disability due to developmental disorder, unspecified     Microalbuminuria     Osteopathia     Osteoporosis     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[facility] [address] )      Past Surgical History:   Procedure Laterality Date    ABDOMINAL SURGERY      COLECTOMY MIN      re-anastomosis 7/22/16    COLONOSCOPY N/A 7/21/2016    Procedure: COLONOSCOPY;  Surgeon: Messi Mejía MD;  [facility]; Service:     COLONOSCOPY N/A 1/31/2016    Procedure: COLONOSCOPY;  Surgeon: Messi Mejía MD;  [facility];  Service:     COLONOSCOPY N/A 7/25/2017    Procedure: COLONOSCOPY;  Surgeon: Messi Mejía MD;  [facility]; Service: Colorectal    COLOSTOMY      EXPLORATORY LAPAROTOMY W/ BOWEL RESECTION N/A 1/31/2016    Procedure: exploratory laparotomy, left sigmoidectomy, coloproctostomy, take down splenic flexure, loop colostomy;  Surgeon: Messi Mejía MD;  [facility];  Service:     [place] CLOSE ENTEROSTOMY N/A 7/22/2016    Procedure: SEGMENTAL COLECTOMY WITH COLOCOLOSTOMY;  Surgeon: Messi Mejía MD;  [facility];  Service: Colorectal     Family History   Problem Relation Age of Onset    Family history unknown: Yes      Social History   History   Alcohol Use No     History   Drug Use No     History   Smoking Status    Never Smoker   Smokeless Tobacco    Never Used     Medications:   Meds/Allergies     Current Outpatient Prescriptions:     acetaminophen (TYLENOL) 325 mg tablet, Take 2 tablets (650 mg total) by mouth every 6 (six) hours as needed (pain, fever greater than 100 4), Disp: 240 tablet, Rfl: 0    ARIPiprazole (ABILIFY) 30 mg tablet, Take 30 mg by mouth daily at bedtime  , Disp: , Rfl:     baclofen 10 mg tablet, Take 1 tablet (10 mg total) by mouth 2 (two) times a day, Disp: 60 tablet, Rfl: 5    calcium carbonate 1250 MG capsule, Take 500 mg by mouth daily  , Disp: , Rfl:     carbamide peroxide (DEBROX) 6 5 % otic solution, Administer into ears, Disp: , Rfl:     Cholecalciferol (VITAMIN D) 2000 UNITS tablet, Take 2,000 Units by mouth daily  , Disp: , Rfl:     Citalopram Hydrobromide (CELEXA PO), Take 40 mg by mouth every morning  , Disp: , Rfl:     denosumab (PROLIA) 60 mg/mL, Inject 1 mL (60 mg total) under the skin once for 1 dose, Disp: 1 mL, Rfl: 0    denosumab (PROLIA) 60 mg/mL, Inject 1 mL (60 mg total) under the skin once for 1 dose, Disp: 1 mL, Rfl: 0    Diaper Rash Products (A+D PREVENT) OINT, Apply topically, Disp: , Rfl:     divalproex sodium (DEPAKOTE) 500 mg EC tablet, Take 1,000 mg by mouth daily  , Disp: , Rfl:     docosanol (ABREVA) 10 %, Apply topically, Disp: , Rfl:     Docusate Sodium (COLACE PO), Take 100 mg by mouth 2 (two) times a day Hold one day for loose stools  , Disp: , Rfl:     ferrous sulfate 324 (65 Fe) mg, Take 325 mg by mouth 2 (two) times a day , Disp: , Rfl:     fexofenadine (ALLEGRA) 180 MG tablet, Take 180 mg by mouth as needed  , Disp: , Rfl:     hydrocortisone 1 % cream, Apply topically 2 (two) times a day, Disp: , Rfl:     Levothyroxine Sodium (SYNTHROID PO), Take 25 mcg by mouth daily  , Disp: , Rfl:     LORazepam (ATIVAN) 0 5 mg tablet, Take 0 5 mg by mouth 2 (two) times a day   , Disp: , Rfl:     MetFORMIN HCl (GLUCOPHAGE PO), Take 1,000 mg by mouth 2 (two) times a day , Disp: , Rfl:     miconazole (NEOSPORIN AF) 2 % cream, Apply topically 2 (two) times a day as needed, Disp: , Rfl:     nitrofurantoin (MACRODANTIN) 50 mg capsule, Take 1 capsule by mouth Bedtime   , Disp: , Rfl:     olopatadine HCl (PATADAY) 0 2 % opth drops, Administer 1 drop to both eyes as needed, Disp: , Rfl:     omeprazole (PriLOSEC) 20 mg delayed release capsule, Take 20 mg by mouth daily, Disp: , Rfl:     Oxybutynin Chloride (DITROPAN XL PO), Take 5 mg by mouth 3 (three) times a day   , Disp: , Rfl:     Polyethylene Glycol 3350 (MIRALAX PO), Take 17 g by mouth every morning  , Disp: , Rfl:     polyvinyl alcohol-povidone (REFRESH) 1 4-0 6 % ophthalmic solution, Apply to eye, Disp: , Rfl:     simvastatin (ZOCOR) 20 mg tablet, Take 20 mg by mouth , Disp: , Rfl:     TraZODone HCl (DESYREL PO), Take 100 mg by mouth daily at bedtime  , Disp: , Rfl:   No Known Allergies    OBJECTIVE    Vitals: There were no vitals filed for this visit  Wt Readings from Last 3 Encounters:   06/26/18 49 9 kg (110 lb)   06/08/18 50 3 kg (110 lb 12 8 oz)   05/24/18 50 3 kg (111 lb)     There is no height or weight on file to calculate BMI  Temp Readings from Last 3 Encounters:   06/26/18 97 9 °F (36 6 °C) (Tympanic)   06/08/18 (!) 95 6 °F (35 3 °C)   06/06/18 97 8 °F (36 6 °C) (Oral)     BP Readings from Last 3 Encounters:   06/26/18 102/76   06/08/18 110/76   06/06/18 121/78     Pulse Readings from Last 3 Encounters:   06/26/18 76   06/08/18 72   06/06/18 64     No LMP recorded  Patient is postmenopausal     Physical Exam:    Physical Exam   Constitutional: Vital signs are normal  She appears well-developed and well-nourished  She does not appear ill  No distress  HENT:   Head: Normocephalic and atraumatic  Right Ear: External ear normal    Left Ear: External ear normal    Nose: Nose normal    Eyes: Conjunctivae, EOM and lids are normal    Neck: No JVD present  No tracheal deviation present  Cardiovascular: Intact distal pulses  Pulmonary/Chest: No accessory muscle usage  No respiratory distress  Abdominal: Normal appearance  Musculoskeletal: She exhibits no edema  Neurological: She is alert  Skin: No rash noted  She is not diaphoretic  Psychiatric: Her affect is blunt  She is slowed and withdrawn  She is noncommunicative  Nursing note and vitals reviewed  Labs: I have personally reviewed all pertinent results  Imaging:  I have personally reviewed all pertinent results      Assessment/Plan   Healthcare maintenance  Colonoscopy: up-to-date performed on 7/15  Mammo: Performed May 2018 within normal limits  Papsmear: (-) 10/30/17  DEXA:  Osteoporosis  Present on 01/19/2018 on Prolia  Ambulation: Use of Walker/ wheel chair  PPD screening: Negative on 1/23/18; repeat in 2 years  Vaccines: Flu of 10/17; Tdap 2018       HTN (hypertension)  Reviewed BP logs, ranging /52-80's  Continue management off anti-hypertensives    Ambulatory dysfunction  Attends [facility]  Inconsistent care by caregivers with allowing her to walk vs wheelchair  Hx falls in the past  Neurology started her on baclofen, has follow up coming up    Karson Espinal was seen today for follow-up and medicare wellness visit  Diagnoses and all orders for this visit:    Encounter for Medicare annual wellness exam    Essential hypertension    Ambulatory dysfunction    Type 2 diabetes mellitus without complication, without long-term current use of insulin (HCC)  -     Hemoglobin A1C; Future  -     Microalbumin / creatinine urine ratio    Cerebral palsy, unspecified type (HCC)  -     Comprehensive metabolic panel; Future  -     CK (with reflex to MB); Future    Need for hepatitis C screening test  -     Hepatitis C antibody; Future      - PCP: Rochelle Farley MD  - Follow-up appointments:     Future Appointments  Date Time Provider [place]   9/12/2018 11:20 AM DO OSWALD [facility]   9/13/2018 10:30 AM Gia Lopes MD NEURO [facility]   10/29/2018 11:00 AM LEATHA [facility]   11/29/2018 1:20 PM Ashish Morales MD S [facility]  [facility]     _____________________________________________________________________   The attending physician, Dr Gracia Ashley, agreed with the plan      Ashish Morales MD, PGY-3  [address] [place] [address] Family Medicine   9/6/2018

## 2018-09-06 NOTE — PATIENT INSTRUCTIONS
Obesity   AMBULATORY CARE:   Obesity  is when your body mass index (BMI) is greater than 30  Your healthcare provider will use your height and weight to measure your BMI  The risks of obesity include  many health problems, such as injuries or physical disability  You may need tests to check for the following:  · Diabetes     · High blood pressure or high cholesterol     · Heart disease     · Gallbladder or liver disease     · Cancer of the colon, breast, prostate, liver, or kidney     · Sleep apnea     · Arthritis or gout  Seek care immediately if:   · You have a severe headache, confusion, or difficulty speaking  · You have weakness on one side of your body  · You have chest pain, sweating, or shortness of breath  Contact your healthcare provider if:   · You have symptoms of gallbladder or liver disease, such as pain in your upper abdomen  · You have knee or hip pain and discomfort while walking  · You have symptoms of diabetes, such as intense hunger and thirst, and frequent urination  · You have symptoms of sleep apnea, such as snoring or daytime sleepiness  · You have questions or concerns about your condition or care  Treatment for obesity  focuses on helping you lose weight to improve your health  Even a small decrease in BMI can reduce the risk for many health problems  Your healthcare provider will help you set a weight-loss goal   · Lifestyle changes  are the first step in treating obesity  These include making healthy food choices and getting regular physical activity  Your healthcare provider may suggest a weight-loss program that involves coaching, education, and therapy  · Medicine  may help you lose weight when it is used with a healthy diet and physical activity  · Surgery  can help you lose weight if you are very obese and have other health problems  There are several types of weight-loss surgery  Ask your healthcare provider for more information    Be successful losing weight:   · Set small, realistic goals  An example of a small goal is to walk for 20 minutes 5 days a week  Anther goal is to lose 5% of your body weight  · Tell friends, family members, and coworkers about your goals  and ask for their support  Ask a friend to lose weight with you, or join a weight-loss support group  · Identify foods or triggers that may cause you to overeat , and find ways to avoid them  Remove tempting high-calorie foods from your home and workplace  Place a bowl of fresh fruit on your kitchen counter  If stress causes you to eat, then find other ways to cope with stress  · Keep a diary to track what you eat and drink  Also write down how many minutes of physical activity you do each day  Weigh yourself once a week and record it in your diary  Eating changes: You will need to eat 500 to 1,000 fewer calories each day than you currently eat to lose 1 to 2 pounds a week  The following changes will help you cut calories:  · Eat smaller portions  Use small plates, no larger than 9 inches in diameter  Fill your plate half full of fruits and vegetables  Measure your food using measuring cups until you know what a serving size looks like  · Eat 3 meals and 1 or 2 snacks each day  Plan your meals in advance  Lisa Silence and eat at home most of the time  Eat slowly  · Eat fruits and vegetables at every meal   They are low in calories and high in fiber, which makes you feel full  Do not add butter, margarine, or cream sauce to vegetables  Use herbs to season steamed vegetables  · Eat less fat and fewer fried foods  Eat more baked or grilled chicken and fish  These protein sources are lower in calories and fat than red meat  Limit fast food  Dress your salads with olive oil and vinegar instead of bottled dressing  · Limit the amount of sugar you eat  Do not drink sugary beverages  Limit alcohol  Activity changes:  Physical activity is good for your body in many ways   It helps you burn calories and build strong muscles  It decreases stress and depression, and improves your mood  It can also help you sleep better  Talk to your healthcare provider before you begin an exercise program   · Exercise for at least 30 minutes 5 days a week  Start slowly  Set aside time each day for physical activity that you enjoy and that is convenient for you  It is best to do both weight training and an activity that increases your heart rate, such as walking, bicycling, or swimming  · Find ways to be more active  Do yard work and housecleaning  Walk up the stairs instead of using elevators  Spend your leisure time going to events that require walking, such as outdoor festivals or fairs  This extra physical activity can help you lose weight and keep it off  Follow up with your healthcare provider as directed: You may need to meet with a dietitian  Write down your questions so you remember to ask them during your visits  © 2017 [address] [employer] Information is for End User's use only and may not be sold, redistributed or otherwise used for commercial purposes  All illustrations and images included in CareNotes® are the copyrighted property of [employer]  or Thad Jackson  The above information is an  only  It is not intended as medical advice for individual conditions or treatments  Talk to your doctor, nurse or pharmacist before following any medical regimen to see if it is safe and effective for you  Urinary Incontinence   WHAT YOU NEED TO KNOW:   What is urinary incontinence? Urinary incontinence (UI) is when you lose control of your bladder  What causes UI? UI occurs because your bladder cannot store or empty urine properly  The following are the most common types of UI:  · Stress incontinence  is when you leak urine due to increased bladder pressure  This may happen when you cough, sneeze, or exercise       · Urge incontinence  is when you feel the need to urinate right away and leak urine accidentally  · Mixed incontinence  is when you have both stress and urge UI  What are the signs and symptoms of UI?   · You feel like your bladder does not empty completely when you urinate  · You urinate often and need to urinate immediately  · You leak urine when you sleep, or you wake up with the urge to urinate  · You leak urine when you cough, sneeze, exercise, or laugh  How is UI diagnosed? Your healthcare provider will ask how often you leak urine and whether you have stress or urge symptoms  Tell him which medicines you take, how often you urinate, and how much liquid you drink each day  You may need any of the following tests:  · Urine tests  may show infection or kidney function  · A pelvic exam  may be done to check for blockages  A pelvic exam will also show if your bladder, uterus, or other organs have moved out of place  · An x-ray, ultrasound, or CT  may show problems with parts of your urinary system  You may be given contrast liquid to help your organs show up better in the pictures  Tell the healthcare provider if you have ever had an allergic reaction to contrast liquid  Do not enter the MRI room with anything metal  Metal can cause serious injury  Tell the healthcare provider if you have any metal in or on your body  · A bladder scan  will show how much urine is left in your bladder after you urinate  You will be asked to urinate and then healthcare providers will use a small ultrasound machine to check the urine left in your bladder  · Cystometry  is used to check the function of your urinary system  Your healthcare provider checks the pressure in your bladder while filling it with fluid  Your bladder pressure may also be tested when your bladder is full and while you urinate  How is UI treated? · Medicines  can help strengthen your bladder control      · Electrical stimulation  is used to send a small amount of electrical energy to your pelvic floor muscles  This helps control your bladder function  Electrodes may be placed outside your body or in your rectum  For women, the electrodes may be placed in the vagina  · A bulking agent  may be injected into the wall of your urethra to make it thicker  This helps keep your urethra closed and decreases urine leakage  · Devices  such as a clamp, pessary, or tampon may help stop urine leaks  Ask your healthcare provider for more information about these and other devices  · Surgery  may be needed if other treatments do not work  Several types of surgery can help improve your bladder control  Ask your healthcare provider for more information about the surgery you may need  How can I manage my symptoms? · Do pelvic muscle exercises often  Your pelvic muscles help you stop urinating  Squeeze these muscles tight for 5 seconds, then relax for 5 seconds  Gradually work up to squeezing for 10 seconds  Do 3 sets of 15 repetitions a day, or as directed  This will help strengthen your pelvic muscles and improve bladder control  · A catheter  may be used to help empty your bladder  A catheter is a tiny, plastic tube that is put into your bladder to drain your urine  Your healthcare provider may tell you to use a catheter to prevent your bladder from getting too full and leaking urine  · Keep a UI record  Write down how often you leak urine and how much you leak  Make a note of what you were doing when you leaked urine  · Train your bladder  Go to the bathroom at set times, such as every 2 hours, even if you do not feel the urge to go  You can also try to hold your urine when you feel the urge to go  For example, hold your urine for 5 minutes when you feel the urge to go  As that becomes easier, hold your urine for 10 minutes  · Drink liquids as directed  Ask your healthcare provider how much liquid to drink each day and which liquids are best for you   You may need to limit the amount of liquid you drink to help control your urine leakage  Limit or do not have drinks that contain caffeine or alcohol  Do not drink any liquid right before you go to bed  · Prevent constipation  Eat a variety of high-fiber foods  Good examples are high-fiber cereals, beans, vegetables, and whole-grain breads  Prune juice may help make your bowel movement softer  Walking is the best way to trigger your intestines to have a bowel movement  · Exercise regularly and maintain a healthy weight  Ask your healthcare provider how much you should weigh and about the best exercise plan for you  Weight loss and exercise will decrease pressure on your bladder and help you control your leakage  Ask him to help you create a weight loss plan if you are overweight  When should I seek immediate care? · You have severe pain  · You are confused or cannot think clearly  When should I contact my healthcare provider? · You have a fever  · You see blood in your urine  · You have pain when you urinate  · You have new or worse pain, even after treatment  · Your mouth feels dry or you have vision changes  · Your urine is cloudy or smells bad  · You have questions or concerns about your condition or care  CARE AGREEMENT:   You have the right to help plan your care  Learn about your health condition and how it may be treated  Discuss treatment options with your caregivers to decide what care you want to receive  You always have the right to refuse treatment  The above information is an  only  It is not intended as medical advice for individual conditions or treatments  Talk to your doctor, nurse or pharmacist before following any medical regimen to see if it is safe and effective for you  © 2017 [address] [employer] Information is for End User's use only and may not be sold, redistributed or otherwise used for commercial purposes   All illustrations and images included in CareNotes® are the copyrighted property of A D A M , Inc  or Thad Jackson  Cigarette Smoking and Your Health   AMBULATORY CARE:   Risks to your health if you smoke:  Nicotine and other chemicals found in tobacco damage every cell in your body  Even if you are a light smoker, you have an increased risk for cancer, heart disease, and lung disease  If you are pregnant or have diabetes, smoking increases your risk for complications  Benefits to your health if you stop smoking:   · You decrease respiratory symptoms such as coughing, wheezing, and shortness of breath  · You reduce your risk for cancers of the lung, mouth, throat, kidney, bladder, pancreas, stomach, and cervix  If you already have cancer, you increase the benefits of chemotherapy  You also reduce your risk for cancer returning or a second cancer from developing  · You reduce your risk for heart disease, blood clots, heart attack, and stroke  · You reduce your risk for lung infections, and diseases such as pneumonia, asthma, chronic bronchitis, and emphysema  · Your circulation improves  More oxygen can be delivered to your body  If you have diabetes, you lower your risk for complications, such as kidney, artery, and eye diseases  You also lower your risk for nerve damage  Nerve damage can lead to amputations, poor vision, and blindness  · You improve your body's ability to heal and to fight infections  Benefits to the health of others if you stop smoking:  Tobacco is harmful to nonsmokers who breathe in your secondhand smoke  The following are ways the health of others around you may improve when you stop smoking:  · You lower the risks for lung cancer and heart disease in nonsmoking adults  · If you are pregnant, you lower the risk for miscarriage, early delivery, low birth weight, and stillbirth  You also lower your baby's risk for SIDS, obesity, developmental delay, and neurobehavioral problems, such as ADHD  · If you have children, you lower their risk for ear infections, colds, pneumonia, bronchitis, and asthma  For more information and support to stop smoking:   · [employer]  gov  Phone: 3- 660 - 810-3132  Web Address: www smokefree  gov  Follow up with your healthcare provider as directed:  Write down your questions so you remember to ask them during your visits  © 2017 [address] [employer] Information is for End User's use only and may not be sold, redistributed or otherwise used for commercial purposes  All illustrations and images included in CareNotes® are the copyrighted property of A D A M , Inc  or Thad Jackson  The above information is an  only  It is not intended as medical advice for individual conditions or treatments  Talk to your doctor, nurse or pharmacist before following any medical regimen to see if it is safe and effective for you  Fall Prevention   AMBULATORY CARE:   Fall prevention  includes ways to make your home and other areas safer  It also includes ways you can move more carefully to prevent a fall  Health conditions that cause changes in your blood pressure, vision, or muscle strength and coordination may increase your risk for falls  Medicines may also increase your risk for falls if they make you dizzy, weak, or sleepy  Call 911 or have someone else call if:   · You have fallen and are unconscious  · You have fallen and cannot move part of your body  Contact your healthcare provider if:   · You have fallen and have pain or a headache  · You have questions or concerns about your condition or care  Fall prevention tips:   · Stand or sit up slowly  This may help you keep your balance and prevent falls  · Use assistive devices as directed  Your healthcare provider may suggest that you use a cane or walker to help you keep your balance  You may need to have grab bars put in your bathroom near the toilet or in the shower      · Wear shoes that fit well and have soles that   Wear shoes both inside and outside  Use slippers with good   Do not wear shoes with high heels  · Wear a personal alarm  This is a device that allows you to call 911 if you fall and need help  Ask your healthcare provider for more information  · Stay active  Exercise can help strengthen your muscles and improve your balance  Your healthcare provider may recommend water aerobics or walking  He or she may also recommend physical therapy to improve your coordination  Never start an exercise program without talking to your healthcare provider first      · Manage your medical conditions  Keep all appointments with your healthcare providers  Visit your eye doctor as directed  Home safety tips:   · Add items to prevent falls in the bathroom  Put nonslip strips on your bath or shower floor to prevent you from slipping  Use a bath mat if you do not have carpet in the bathroom  This will prevent you from falling when you step out of the bath or shower  Use a shower seat so you do not need to stand while you shower  Sit on the toilet or a chair in your bathroom to dry yourself and put on clothing  This will prevent you from losing your balance from drying or dressing yourself while you are standing  · Keep paths clear  Remove books, shoes, and other objects from walkways and stairs  Place cords for telephones and lamps out of the way so that you do not need to walk over them  Tape them down if you cannot move them  Remove small rugs  If you cannot remove a rug, secure it with double-sided tape  This will prevent you from tripping  · Install bright lights in your home  Use night lights to help light paths to the bathroom or kitchen  Always turn on the light before you start walking  · Keep items you use often on shelves within reach  Do not use a step stool to help you reach an item  · Paint or place reflective tape on the edges of your stairs    This will help you see the stairs better  Follow up with your healthcare provider as directed:  Write down your questions so you remember to ask them during your visits  © 2017 [address] [employer] Information is for End User's use only and may not be sold, redistributed or otherwise used for commercial purposes  All illustrations and images included in CareNotes® are the copyrighted property of A D A M , Inc  or Thad Jackson  The above information is an  only  It is not intended as medical advice for individual conditions or treatments  Talk to your doctor, nurse or pharmacist before following any medical regimen to see if it is safe and effective for you  Advance Directives   WHAT YOU NEED TO KNOW:   What are advance directives? Advance directives are legal documents that state your wishes and plans for medical care  These plans are made ahead of time in case you lose your ability to make decisions for yourself  Advance directives can apply to any medical decision, such as the treatments you want, and if you want to donate organs  What are the types of advance directives? There are many types of advance directives, and each state has rules about how to use them  You may choose a combination of any of the following:  · Living will: This is a written record of the treatment you want  You can also choose which treatments you do not want, which to limit, and which to stop at a certain time  This includes surgery, medicine, IV fluid, and tube feedings  · Durable power of  for healthcare [facility]): This is a written record that states who you want to make healthcare choices for you when you are unable to make them for yourself  This person, called a proxy, is usually a family member or a friend  You may choose more than 1 proxy  · Do not resuscitate (DNR) order:  A DNR order is used in case your heart stops beating or you stop breathing   It is a request not to have certain forms of treatment, such as CPR  A DNR order may be included in other types of advance directives  · Medical directive: This covers the care that you want if you are in a coma, near death, or unable to make decisions for yourself  You can list the treatments you want for each condition  Treatment may include pain medicine, surgery, blood transfusions, dialysis, IV or tube feedings, and a ventilator (breathing machine)  · Values history: This document has questions about your views, beliefs, and how you feel and think about life  This information can help others choose the care that you would choose  Why are advance directives important? An advance directive helps you control your care  Although spoken wishes may be used, it is better to have your wishes written down  Spoken wishes can be misunderstood, or not followed  Treatments may be given even if you do not want them  An advance directive may make it easier for your family to make difficult choices about your care  How do I decide what to put in my advance directives? · Make informed decisions:  Make sure you fully understand treatments or care you may receive  Think about the benefits and problems your decisions could cause for you or your family  Talk to healthcare providers if you have concerns or questions before you write down your wishes  You may also want to talk with your Religion or , or a   Check your state laws to make sure that what you put in your advance directive is legal      · Sign all forms:  Sign and date your advance directive when you have finished  You may also need 2 witnesses to sign the forms  Witnesses cannot be your doctor or his staff, your spouse, heirs or beneficiaries, people you owe money to, or your chosen proxy  Talk to your family, proxy, and healthcare providers about your advance directive  Give each person a copy, and keep one for yourself in a place you can get to easily   Do not keep it hidden or locked away  · Review and revise your plans: You can revise your advance directive at any time, as long as you are able to make decisions  Review your plan every year, and when there are changes in your life, or your health  When you make changes, let your family, proxy, and healthcare providers know  Give each a new copy  Where can I find more information? · [employer]  [address] , [place] [address]  Phone: 5- 210 - 311-1790  Phone: 1- 687 - [phone]  Web Address: http://www  aafp org  · [address] [facility])  [address] [address] [facility], [address] [facility] , [address] [place] [address]  Phone: 8- [phone] - [phone]  Phone: [phone]  Web Address: Jacqui medina  CARE AGREEMENT:   You have the right to help plan your care  To help with this plan, you must learn about your health condition and treatment options  You must also learn about advance directives and how they are used  Work with your healthcare providers to decide what care will be used to treat you  You always have the right to refuse treatment  The above information is an  only  It is not intended as medical advice for individual conditions or treatments  Talk to your doctor, nurse or pharmacist before following any medical regimen to see if it is safe and effective for you  © 2017 [address]  Information is for End User's use only and may not be sold, redistributed or otherwise used for commercial purposes  All illustrations and images included in CareNotes® are the copyrighted property of [employer]  or Thad Jackson  Thank you for enrolling in [address] [place] [address]  Please follow the instructions below to securely access your online medical record  [employer] allows you to send messages to your doctor, view your test results, renew your prescriptions, schedule appointments, and more      [address] [place] [address] uses Single Sign on (SSO) Technology for you to log in and access our [facility], including [place]  No more remembering multiple user names and passwords! We are going to guide you through, step by step, to help you set up your [employer] account which will provide access to your MyChart account  How Do I Sign Up? 1  In your Internet browser, go to [email or web address]       2  Click on the [facility] patient account and then click Dont have an                 Account? Create one now      3  Enter your demographic information and chose a user name (email address) and password  Think of one that is secure and easy to remember  Enter a Referral code if you have one (this is not your MyChart code ) Accept the Terms and Conditions and the Privacy Policy  4  Select your security questions that you will use to reset your password should you forget it  Click Submit  5  Enter your shopatplaceshart Activation Code exactly as it appears below  You will not need to use this code after you have completed the sign-up process  If you do not sign up before the expiration date, you must request a new code  [employer] Activation Code: [email or web address]  Expires: 9/20/2018 12:53 PM    6  Confirm your email address  An email confirmation was sent to you  Please open that email and click Confirm your Email   You should then be redirected to our [employer] Single sign on page, where you will log on with the user name and password you created! Proceed to the [employer] Icon to view your personal health information  Additional Information  If you have questions, you can e-mail patient  [email or web address] or call [phone] to talk to our customer support staff  Remember, [employer] is NOT to be used for urgent needs  For medical emergencies, dial 911

## 2018-09-06 NOTE — ASSESSMENT & PLAN NOTE
Attends [facility]  Inconsistent care by caregivers with allowing her to walk vs wheelchair  Hx falls in the past  Neurology started her on baclofen, has follow up coming up

## 2018-09-06 NOTE — PROGRESS NOTES
Artem is here for her Subsequent Wellness visit  Last Medicare Wellness visit information reviewed, patient interviewed, no change since last AWV  Health Risk Assessment:  Patient rates overall health as good  Patient feels that their physical health rating is Slightly better  Eyesight was rated as Same  Hearing was rated as Same  Patient feels that their emotional and mental health rating is Same  Pain experienced by patient in the last 7 days has been None  Emotional/Mental Health:  Patient has been feeling nervous/anxious  PHQ-9 Depression Screening:    Frequency of the following problems over the past two weeks:      1  Little interest or pleasure in doing things: 0 - not at all      2  Feeling down, depressed, or hopeless: 0 - not at all  PHQ-2 Score: 0          Broken Bones/Falls: Fall Risk Assessment:    In the past year, patient has experienced: History of falling in past year     Number of falls: greater than 3          Bladder/Bowel:  Patient has leaked urine accidently in the last six months  Patient reports loss of bowel control  Immunizations:  Patient has had a flu vaccination within the last year  Patient has received a pneumonia shot  Patient has received tetanus/diphtheria shot  Date of tetanus/diphtheria shot: 6/6/2018    Home Safety:  Patient does not have trouble with stairs inside or outside of their home  Patient currently reports that there are no safety hazards present in home, working smoke alarms, no working carbon monoxide detectors  Preventative Screenings:   Breast cancer screening performed, colon cancer screen completed, cholesterol screen completed, glaucoma eye exam completed,     Nutrition:  Current diet: Diabetic with servings of the following:  (Additional Comments: 1500cal )    Medications:  Patient is currently taking over-the-counter supplements  Patient is not able to manage medications  Lifestyle Choices:  Patient reports no tobacco use  Patient has smoked or used tobacco in the past   Patient has stopped her tobacco use  Tobacco use quit date: over 15 years ago   Patient reports no alcohol use  Patient does not drive a vehicle  Patient wears seat belt  Current level of exercise of physical activity described by patient as: assested   Activities of Daily Living:  Unable to get out of bed by his or her self, unable to dress self, unable to make own meals, unable to do own shopping, unable to bathe self, unable to do laundry/housekeeping, unable to manage own money and other related tasks    Previous Hospitalizations:  No hospitalization or ED visit in past 12 months        Advanced Directives:  Patient has decided on a power of   Patient has not spoken to designated power of   Patient has not completed advanced directive  Additional Comments:  Follow ID bulletin     Preventative Screening/Counseling:      Cardiovascular:      General: Risks and Benefits Discussed and Screening Not Indicated      Counseling: Healthy Diet, Healthy Weight and Improve Exercise Tolerance          Diabetes:      General: Risks and Benefits Discussed and Screening Current      Counseling: Healthy Diet, Healthy Weight and Improve Physical Activity      Due for labs: Blood Glucose          Colorectal Cancer:      General: Risks and Benefits Discussed and Screening Current      Counseling: high fiber diet          Breast Cancer:      General: Risks and Benefits Discussed and Screening Current          Cervical Cancer:      General: Risks and Benefits Discussed and Screening Current          Osteoporosis:      General: Risks and Benefits Discussed and Screening Current      Counseling: Calcium and Vitamin D Intake and Regular Weightbearing Exercise          AAA:      General: Screening Not Indicated          Glaucoma:      General: Risks and Benefits Discussed and Screening Current          HIV:      General: Risks and Benefits Discussed and Screening Not Indicated          Hepatitis C:      General: Risks and Benefits Discussed      Counseling: has received general HCV counseling        Advanced Directives:   Patient has no living will for healthcare, has durable POA for healthcare, patient does not have an advanced directive  End of life assessment reviewed with patient  Provider agrees with end of life decisions   Immunizations:  Patient reviewed and up to date      Influenza: Risks & Benefits Discussed and Influenza Recommended Annually      Pneumococcal: Risks & Benefits Discussed      Shingrix: Risks & Benefits Discussed      Hepatitis B (Medium to high risk patients): Risks & Benefits Discussed and Hep B Vaccine Series UTD      Zostavax: Risks & Benefits Discussed      TD: Risks & Benefits Discussed      Other Preventative Counseling (Non-Medicare):   Fall Prevention, Increase physical activity, Nutrition Counseling, Car/seat belt/driving safety reviewed, Skin self-exam, Sunscreen use, Dietary education for weight gain and Weight reduction discussed

## 2018-09-12 ENCOUNTER — OFFICE VISIT (OUTPATIENT)
Dept: CARDIOLOGY CLINIC | Facility: CLINIC | Age: 58
End: 2018-09-12
Payer: COMMERCIAL

## 2018-09-12 VITALS
DIASTOLIC BLOOD PRESSURE: 60 MMHG | HEART RATE: 68 BPM | HEIGHT: 58 IN | WEIGHT: 108 LBS | SYSTOLIC BLOOD PRESSURE: 98 MMHG | BODY MASS INDEX: 22.67 KG/M2

## 2018-09-12 DIAGNOSIS — I95.2 HYPOTENSION DUE TO DRUGS: Primary | ICD-10-CM

## 2018-09-12 DIAGNOSIS — G80.8 OTHER CEREBRAL PALSY (HCC): ICD-10-CM

## 2018-09-12 PROCEDURE — 99213 OFFICE O/P EST LOW 20 MIN: CPT | Performed by: INTERNAL MEDICINE

## 2018-09-12 NOTE — PROGRESS NOTES
Cardiology Follow Up    [address] [place] [address]  1960  [id number]  [address] [facility]  [address] [place] [address] Drive [address] [place] [address]  [phone]    1  Hypotension due to drugs     2  Other cerebral palsy ([address] )         Discussion/Summary:Overall she is stable from a cardiac standpoint blood pressure has been better without any medications  Continue with hydration no further cardiac testing  She can follow-up on an as-needed basis  Interval History:   61-year-old female with a history of advanced cerebral palsy, hypotension presents for follow-up visit  I withdrew her from her ACE-inhibitor she did have microscopic protein in the urine however pressures were quite low and she was falling due to symptomatic hypotension  We discontinued the ACE-inhibitor and she has been doing well since  She is unable to provide any history herself  Per the caregiver there has been no reports of any discomfort or distress  She is participating in physical therapy  She eats well and has good hydration  She typically get 40 oz of fluid a day  Problem List     Chronic hypernatremia    Hyperkalemia    Dehydration    Colostomy in place ([facility])    HTN (hypertension)    Hyperlipidemia    Gastroesophageal reflux disease without esophagitis    Cerebral palsy ([facility])    Spasticity    Ambulatory dysfunction    Hypoxia    Frequent UTI    Bipolar disorder ([facility])    Severe Intellectual disability    Type 2 diabetes mellitus, without long-term current use of insulin ([facility])    Lab Results   Component Value Date    HGBA1C 6 7 (H) 04/18/2018       No results for input(s): POCGLU in the last 72 hours      Blood Sugar Average: Last 72 hrs:          Abnormal albumin    Absolute anemia    Altered mental status, unspecified    Anxiety    Atopic dermatitis    Chondrodermatitis nodularis helicis of left ear    Closed fracture of left hip ([facility])    Constipation Diverticulosis    Dry eye    Difficult or painful urination    Eye redness    Fatigue    Gait abnormality    Herpes labialis    Hypotension    Hypothyroidism    Impacted cerumen    Swelling, mass, or lump in head and neck    Menopause    Microalbuminuria    Osteoporosis    Other chronic pain    Peripheral neuropathy    Physical deconditioning    Postmenopausal bleeding    Resting tremor    Seasonal allergies    Skin irritation    Symptoms involving urinary system    Thrombocytopenia (HCC)    Urinary incontinence    Weight loss, non-intentional    Fall    Osteoarthritis of both hips    Gait disturbance    Healthcare maintenance        Past Medical History:   Diagnosis Date    Adjustment disorder     Anemia     Bipolar 1 disorder ([facility]     Cerebral palsy ([facility])     Closed left hip fracture ([facility])     no surgery    Constipation     Diabetes mellitus ([facility] )     Disease of thyroid gland     Diverticulosis     Fracture of multiple ribs of right side     Hip fracture ([facility] ) 07/26/2015    left    Hyperlipidemia     Hypotension     Impulse control disorder     Incontinence     Intellectual disability due to developmental disorder, unspecified     Microalbuminuria     Osteopathia     Osteoporosis     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[facility] )      Social History     Social History    Marital status: Single     Spouse name: N/A    Number of children: N/A    Years of education: N/A     Occupational History    Not on file       Social History Main Topics    Smoking status: Never Smoker    Smokeless tobacco: Never Used    Alcohol use No    Drug use: No    Sexual activity: Not on file     Other Topics Concern    Not on file     Social History Narrative    Lives in a group home       Family History   Problem Relation Age of Onset    Family history unknown: Yes     Past Surgical History:   Procedure Laterality Date    ABDOMINAL SURGERY      COLECTOMY MIN      re-anastomosis 7/22/16    COLONOSCOPY N/A 7/21/2016 Procedure: COLONOSCOPY;  Surgeon: Obinna Russell MD;  [facility]; Service:     COLONOSCOPY N/A 1/31/2016    Procedure: COLONOSCOPY;  Surgeon: Obinna Russell MD;  [facility];  Service:     COLONOSCOPY N/A 7/25/2017    Procedure: COLONOSCOPY;  Surgeon: Obinna Russell MD;  [facility]; Service: Colorectal    COLOSTOMY      EXPLORATORY LAPAROTOMY W/ BOWEL RESECTION N/A 1/31/2016    Procedure: exploratory laparotomy, left sigmoidectomy, coloproctostomy, take down splenic flexure, loop colostomy;  Surgeon: Obinna Russell MD;  [facility];  Service:     OH CLOSE ENTEROSTOMY N/A 7/22/2016    Procedure: SEGMENTAL COLECTOMY WITH COLOCOLOSTOMY;  Surgeon: Obinna Russell MD;  [facility];  Service: Colorectal       Current Outpatient Prescriptions:     acetaminophen (TYLENOL) 325 mg tablet, Take 2 tablets (650 mg total) by mouth every 6 (six) hours as needed (pain, fever greater than 100 4), Disp: 240 tablet, Rfl: 0    ARIPiprazole (ABILIFY) 30 mg tablet, Take 30 mg by mouth daily at bedtime  , Disp: , Rfl:     baclofen 10 mg tablet, Take 1 tablet (10 mg total) by mouth 2 (two) times a day, Disp: 60 tablet, Rfl: 5    calcium carbonate 1250 MG capsule, Take 500 mg by mouth daily  , Disp: , Rfl:     carbamide peroxide (DEBROX) 6 5 % otic solution, Administer into ears, Disp: , Rfl:     Cholecalciferol (VITAMIN D) 2000 UNITS tablet, Take 2,000 Units by mouth daily  , Disp: , Rfl:     Citalopram Hydrobromide (CELEXA PO), Take 40 mg by mouth every morning  , Disp: , Rfl:     Diaper Rash Products (A+D PREVENT) OINT, Apply topically, Disp: , Rfl:     divalproex sodium (DEPAKOTE) 500 mg EC tablet, Take 1,000 mg by mouth daily  , Disp: , Rfl:     docosanol (ABREVA) 10 %, Apply topically, Disp: , Rfl:     Docusate Sodium (COLACE PO), Take 100 mg by mouth 2 (two) times a day Hold one day for loose stools   , Disp: , Rfl:     ferrous sulfate 324 (65 Fe) mg, Take 325 mg by mouth 2 (two) times a day , Disp: , Rfl:     fexofenadine (ALLEGRA) 180 MG tablet, Take 180 mg by mouth as needed  , Disp: , Rfl:     hydrocortisone 1 % cream, Apply topically 2 (two) times a day, Disp: , Rfl:     Levothyroxine Sodium (SYNTHROID PO), Take 25 mcg by mouth daily  , Disp: , Rfl:     LORazepam (ATIVAN) 0 5 mg tablet, Take 0 5 mg by mouth 2 (two) times a day   , Disp: , Rfl:     MetFORMIN HCl (GLUCOPHAGE PO), Take 1,000 mg by mouth 2 (two) times a day , Disp: , Rfl:     miconazole (NEOSPORIN AF) 2 % cream, Apply topically 2 (two) times a day as needed, Disp: , Rfl:     nitrofurantoin (MACRODANTIN) 50 mg capsule, Take 1 capsule by mouth Bedtime  , Disp: , Rfl:     olopatadine HCl (PATADAY) 0 2 % opth drops, Administer 1 drop to both eyes as needed, Disp: , Rfl:     omeprazole (PriLOSEC) 20 mg delayed release capsule, Take 20 mg by mouth daily, Disp: , Rfl:     Oxybutynin Chloride (DITROPAN XL PO), Take 5 mg by mouth 3 (three) times a day   , Disp: , Rfl:     Polyethylene Glycol 3350 (MIRALAX PO), Take 17 g by mouth every morning  , Disp: , Rfl:     polyvinyl alcohol-povidone (REFRESH) 1 4-0 6 % ophthalmic solution, Apply to eye, Disp: , Rfl:     simvastatin (ZOCOR) 20 mg tablet, Take 20 mg by mouth , Disp: , Rfl:     TraZODone HCl (DESYREL PO), Take 100 mg by mouth daily at bedtime    , Disp: , Rfl:     denosumab (PROLIA) 60 mg/mL, Inject 1 mL (60 mg total) under the skin once for 1 dose, Disp: 1 mL, Rfl: 0    denosumab (PROLIA) 60 mg/mL, Inject 1 mL (60 mg total) under the skin once for 1 dose, Disp: 1 mL, Rfl: 0  No Known Allergies    Labs:     Chemistry        Component Value Date/Time     (H) 04/18/2018 0714     04/06/2016 0704    K 4 2 04/18/2018 0714    K 4 4 04/06/2016 0704     04/18/2018 0714     04/06/2016 0704    CO2 34 (H) 04/18/2018 0714    CO2 27 04/06/2016 0704    BUN 18 04/18/2018 0714    BUN 21 04/06/2016 0704    CREATININE 0 83 04/18/2018 0714    CREATININE 0 72 04/06/2016 0704        Component Value Date/Time    CALCIUM 9 6 04/18/2018 0714    CALCIUM 9 6 04/06/2016 0704    ALKPHOS 46 07/05/2018 0744    ALKPHOS 42 04/06/2016 0704    AST 14 07/05/2018 0744    AST 15 04/06/2016 0704    ALT 19 07/05/2018 0744    ALT 9 04/06/2016 0704    BILITOT 0 1 (L) 04/06/2016 0704            Lab Results   Component Value Date    CHOL 165 10/27/2015     Lab Results   Component Value Date    HDL 49 04/18/2018    HDL 50 10/17/2017    HDL 44 10/12/2016     Lab Results   Component Value Date    LDLCALC 84 04/18/2018    LDLCALC 78 10/17/2017    LDLCALC 80 10/12/2016     Lab Results   Component Value Date    TRIG 111 04/18/2018    TRIG 144 10/17/2017    TRIG 141 10/12/2016     No components found for: CHOLHDL    Imaging: No results found  ECG:        Review of Systems   Unable to perform ROS: patient nonverbal       Vitals:    09/12/18 1120   BP: 98/60   Pulse: 68     Vitals:    09/12/18 1120   Weight: 49 kg (108 lb)     Height: 4' 10" (147 3 cm)   Body mass index is 22 57 kg/m²      Physical Exam:   General appearance:  Appears stated age, alert, well appearing and in no distress  HEENT:  PERRLA, EOMI, no scleral icterus, no conjunctival pallor  NECK:  Supple, No elevated JVP, no thyromegaly, no carotid bruits  HEART:  Regular rate and rhythm, normal S1/S2, no S3/S4, no murmur or rub  LUNGS:  Clear to auscultation bilaterally, no wheezes rales or rhonchi  ABDOMEN:  Soft, non-tender, positive bowel sounds, no rebound or guarding, no organomegaly   EXTREMITIES:  No edema, normal range of motion  VASCULAR:  Normal pedal pulses, good pulse volume   SKIN: No lesions or rashes on exposed skin  NEURO:  CN II-XII intact, no focal deficits

## 2018-09-13 ENCOUNTER — OFFICE VISIT (OUTPATIENT)
Dept: NEUROLOGY | Facility: CLINIC | Age: 58
End: 2018-09-13
Payer: COMMERCIAL

## 2018-09-13 VITALS
BODY MASS INDEX: 23.3 KG/M2 | HEIGHT: 58 IN | SYSTOLIC BLOOD PRESSURE: 110 MMHG | DIASTOLIC BLOOD PRESSURE: 60 MMHG | WEIGHT: 111 LBS | HEART RATE: 102 BPM

## 2018-09-13 DIAGNOSIS — R26.2 AMBULATORY DYSFUNCTION: Primary | ICD-10-CM

## 2018-09-13 DIAGNOSIS — R25.2 SPASTICITY: ICD-10-CM

## 2018-09-13 DIAGNOSIS — G25.2 RESTING TREMOR: ICD-10-CM

## 2018-09-13 DIAGNOSIS — F79 INTELLECTUAL DISABILITY: ICD-10-CM

## 2018-09-13 DIAGNOSIS — G80.0 SPASTIC QUADRIPLEGIC CEREBRAL PALSY (HCC): ICD-10-CM

## 2018-09-13 PROCEDURE — 99214 OFFICE O/P EST MOD 30 MIN: CPT | Performed by: PSYCHIATRY & NEUROLOGY

## 2018-09-13 NOTE — LETTER
September 13, 2018     Rogerioalexandra Agustin, [address] [facility]    Patient: Christelle Perez   YOB: 1960   Date of Visit: 9/13/2018       Dear Dr Melissa Haskins:    Thank you for referring Ingrid Hodge to me for evaluation  Below are my notes for this consultation  If you have questions, please do not hesitate to call me  I look forward to following your patient along with you  Sincerely,        Tiffany Moore MD        CC: No Recipients  Tiffany Moore MD  9/13/2018 11:47 AM  Signed  Patient ID: Christelle Perez is a 66 y o  female  Assessment/Plan:       Problem List Items Addressed This Visit        Nervous and Auditory    Cerebral palsy [facility])       Other    Spasticity    Relevant Orders    Ambulatory referral to Physical Medicine Rehab    Ambulatory dysfunction - Primary    Relevant Orders    Ambulatory referral to Physical Medicine Rehab    Severe Intellectual disability    Resting tremor           Discussion Summary:    Patient does not have seizures  She is on Depakote for mood stabilization which is managed by her psychiatrist  She was evaluated for the possibility of seizures in January 2016, and we determined that she does not  Most recently, her caregiver, who is quite attentive and astute, noticed the patient had evelin having more balance problems and gait difficulty, hence her last appointment  I started the patient on Baclofen hoping that reduction in spasticity would help and the patient has started physical therapy, and there has been some improvement in gait  I do believe the patient would benefit further from seeing one of our PMR doctors, so an appointment will be made with one of them to take over care  I would be happy to see the patient back if she has any new seizure-like events, but otherwise she does not need an appointment with me  Patient Instructions   1  Continue Baclofen 10 mg twice daily  2   She is to follow-up with PM&R in regards to her gait from now on  Subjective:    HPI    4 month follow-up for a 66year-old right-handed female with mental retardation whom I saw for the first time in January 2016 when she was referred by Dr Richy Lugo for change in mental status and new onset staring spells  Jazmine Yusuf note from 12/11/15 states that the patients caregiver had noted a slow change in mental status over a period of 6 months  Patient showed decreased activity, and less interest in group activities  Patient also had difficulty walking and had been leaning to right side  She had broken her left hip in July 2015  Patient had a dazed look, with a wide open mouth for several hours, though she was totally awake and responsive  When I saw the patient on 1/19/2016, patients gait and posture appeared to be related to discomfort in her left hip when seated and with weight bearing  She also had a Parkinsonian tremor  CT of the abdomen/pelvis/left hip and CT of LS spine were ordered  Previous CT head had shown evidence of both cortical and subcortical atrophy and cerebellar atrophy indicating that the patient had had long standing dementia and gait difficulties  Routine EEG was ordered to evaluate the staring spells  On 1/31/16, patient was admitted to [facility] with a UTI and sigmoid volvulus  Patient had an emergent decompressive colonoscopy  Patient did not do well post colonoscopy and remained distended with hypotension  Patient was taken to the OR on 1/31/16 for an exploratory laparotomy, left sigmoidectomy, coloproctostomy, and diverting loop ileostomy that resulted in septic shock requiring intubation and critical care in the ICU  Her postoperative course was complicated by ileus  Urine cultures grew Klebsiella and patient was given antibiotics  Patient also developed thrombocytopenia   Renal was consulted during the admission for the patients hypernatremia, which is chronic, and was given D5W to keep up with her free fluid intake  Patient was discharged on 2/17/16  The patient was admitted to [facility] again from 2/20 through 2/24/16 with dehydration and hyperkalemia  EEG performed 3/23/16 was markedly abnormal due to continuous low amplitude background intermixed theta and delta slowing  No focal abnormalities or interictal epileptiform discharges were noted  No electrographic seizures occurred  Over a series of follow-up visits, it became apparent that the patient [place] would occur mostly when she was debilitated or ill, and were not typical of epileptic seizures  CT lumbar spine performed 3/25/16 showed no acute fracture or subluxation  Minimal lumbar spondylosis   localizer view demonstrates deformity of the left hip, similar to the previous 1/20/16 CT, possibly related to prior fracture and/or contracture  Dedicated left hip radiographs may be of additional use  When the patient was seen on 10/13/2016, her overall condition, including her gait, balance, and alertness had gradually improved since her discharge from the hospital with the help of physical therapy, but had not returned to baseline  When I saw the patient on 5/9/17, her gait, balance, and alertness had improved since her last visit which had been most likely due to her repeated admissions for bowel problems  Staring spells remained resolved when I saw the patient back on 11/14/17  She was able to walk on examination, although she leaned to the right  She had gained weight, although her caregivers were encouraged to increase her protein intake further  Patients albumin improved to 3 1 on 11/30/17  Patient was very alert when I saw her on 5/24/18  Though she was not as weak as on previous visits, she still leaned to the right on gait  She has left spastic hemiparesis, so I prescribed Baclofen 10 mg BID to see if this would help  Last seen on 5/24/18       INTERVAL HISTORY:     Patient presented to the ED on 6/6/18 after she fell backward and sustained a posterior head laceration  X-ray hips, CT spine, and CT head were all normal  She was given a c-collar to wear and was discharged home  AED level on 7/5/18  Valproic acid, free 9 8  Ammonia <10     Current AEDs:  Depakote 500 mg tablet, 2 tabs in AM (for bipolar disorder)  Baclofen 10 mg tablets, 1 tab BID  Lorazepam 0 5 mg tablet, 1 tab BID    Patient is following up in regards to her gait dysfunction  Patient has been attending physical therapy and has continued to take Baclofen  When she first started the Baclofen, she was tired, but this has since resolved  Patient's caregiver believes that her gait has improved  Her physical therapist was reportedly "on the fence" in regards to the patient needing more Baclofen  She has been given diabetic shoes to wear and a brace was placed on her left leg  She is going to be going to occupational therapy for her left hand spasticity  There is a question of using a splint for this hand  The following portions of the patient's history were reviewed and updated as appropriate: allergies, current medications, past family history, past medical history, past social history, past surgical history and problem list          Objective:    Blood pressure 110/60, pulse 102, height 4' 10" (1 473 m), weight 50 3 kg (111 lb), not currently breastfeeding  Physical Exam   Constitutional: She appears well-developed and well-nourished  HENT:   Head: Normocephalic and atraumatic  Eyes: EOM are normal  Pupils are equal, round, and reactive to light  Neck: Neck supple  Cardiovascular: Normal rate and regular rhythm  Pulmonary/Chest: Effort normal    Neurological: She is alert  Psychiatric:   Slightly agitated but easily redirectable   Vitals reviewed  Neurological Exam    Mental Status  The patient is alert  She has poor attention and poor concentration  She has poor knowledge and has poor comprehension    Moderate intellectual disability  Able to speak spontaneously  Able to follow very simple commands  Cranial Nerves    CN II: The patient's visual acuity and visual fields are normal   CN III, IV, VI: The patient's pupils are equally round and reactive to light and ocular movements are normal   CN V: The patient has normal facial sensation  CN VIII:  The patient's hearing is normal   CN IX, X: The patient has symmetric palate movement and normal gag reflex  Motor   Her overall muscle tone is increased throughout  Specifically, the tone is spastic in the left arm,  She has abnormal movement  Gait and Coordination    Abnormal gait such that she leans to the right  This is improved from last visit such that she can walk faster and is stronger  She still becomes off balances such that she will lift her walker off the ground on the left side and will lean significantly toward the ground  Uses a walker for ambulation  ROS:    Review of Systems   Constitutional: Negative  HENT: Negative  Eyes: Negative  Respiratory: Negative  Cardiovascular: Negative  Gastrointestinal: Negative  Endocrine: Negative  Genitourinary: Negative  Musculoskeletal: Negative  Skin: Negative  Allergic/Immunologic: Negative  Neurological: Negative  Hematological: Negative  Psychiatric/Behavioral: Negative  Counseling Documentation:  Time in: 10:50, Time out: 11:15  Total time encounter was 25 minutes and 20 minutes were spent counseling the patient  Attestation:   By signing my name below, Angelina Ferguson, attest that this documentation has been prepared under the direction and in the presence of Manav Leone MD  Electronically Signed: Izabel Acevedo  09/13/18     I, Manav Leone, personally performed the services described in this documentation  All medical record entries made by the scribe were at my direction and in my presence   I have reviewed the chart and discharge instructions and agree that the record reflects my personal performance and is accurate and complete    Cristi Matos MD  09/13/18

## 2018-09-13 NOTE — PROGRESS NOTES
Patient ID: Nerissa Ramachandran is a 66 y o  female  Assessment/Plan:       Problem List Items Addressed This Visit        Nervous and Auditory    Cerebral palsy [facility])       Other    Spasticity    Relevant Orders    Ambulatory referral to Physical Medicine Rehab    Ambulatory dysfunction - Primary    Relevant Orders    Ambulatory referral to Physical Medicine Rehab    Severe Intellectual disability    Resting tremor           Discussion Summary:    Patient does not have seizures  She is on Depakote for mood stabilization which is managed by her psychiatrist  She was evaluated for the possibility of seizures in January 2016, and we determined that she does not  Most recently, her caregiver, who is quite attentive and astute, noticed the patient had evelin having more balance problems and gait difficulty, hence her last appointment  I started the patient on Baclofen hoping that reduction in spasticity would help and the patient has started physical therapy, and there has been some improvement in gait  I do believe the patient would benefit further from seeing one of our PMR doctors, so an appointment will be made with one of them to take over care  I would be happy to see the patient back if she has any new seizure-like events, but otherwise she does not need an appointment with me  Patient Instructions   1  Continue Baclofen 10 mg twice daily  2  She is to follow-up with PM&R in regards to her gait from now on  Subjective:    HPI    4 month follow-up for a 66year-old right-handed female with mental retardation whom I saw for the first time in January 2016 when she was referred by Dr Richy Lugo for change in mental status and new onset staring spells  Jazmine Yusuf note from 12/11/15 states that the patients caregiver had noted a slow change in mental status over a period of 6 months  Patient showed decreased activity, and less interest in group activities   Patient also had difficulty walking and had been leaning to right side  She had broken her left hip in July 2015  Patient had a dazed look, with a wide open mouth for several hours, though she was totally awake and responsive  When I saw the patient on 1/19/2016, patients gait and posture appeared to be related to discomfort in her left hip when seated and with weight bearing  She also had a Parkinsonian tremor  CT of the abdomen/pelvis/left hip and CT of LS spine were ordered  Previous CT head had shown evidence of both cortical and subcortical atrophy and cerebellar atrophy indicating that the patient had had long standing dementia and gait difficulties  Routine EEG was ordered to evaluate the staring spells  On 1/31/16, patient was admitted to [facility] with a UTI and sigmoid volvulus  Patient had an emergent decompressive colonoscopy  Patient did not do well post colonoscopy and remained distended with hypotension  Patient was taken to the OR on 1/31/16 for an exploratory laparotomy, left sigmoidectomy, coloproctostomy, and diverting loop ileostomy that resulted in septic shock requiring intubation and critical care in the ICU  Her postoperative course was complicated by ileus  Urine cultures grew Klebsiella and patient was given antibiotics  Patient also developed thrombocytopenia  Renal was consulted during the admission for the patients hypernatremia, which is chronic, and was given D5W to keep up with her free fluid intake  Patient was discharged on 2/17/16  The patient was admitted to [facility] again from 2/20 through 2/24/16 with dehydration and hyperkalemia  EEG performed 3/23/16 was markedly abnormal due to continuous low amplitude background intermixed theta and delta slowing  No focal abnormalities or interictal epileptiform discharges were noted  No electrographic seizures occurred       Over a series of follow-up visits, it became apparent that the patient [place] would occur mostly when she was debilitated or ill, and were not typical of epileptic seizures  CT lumbar spine performed 3/25/16 showed no acute fracture or subluxation  Minimal lumbar spondylosis   localizer view demonstrates deformity of the left hip, similar to the previous 1/20/16 CT, possibly related to prior fracture and/or contracture  Dedicated left hip radiographs may be of additional use  When the patient was seen on 10/13/2016, her overall condition, including her gait, balance, and alertness had gradually improved since her discharge from the hospital with the help of physical therapy, but had not returned to baseline  When I saw the patient on 5/9/17, her gait, balance, and alertness had improved since her last visit which had been most likely due to her repeated admissions for bowel problems  Staring spells remained resolved when I saw the patient back on 11/14/17  She was able to walk on examination, although she leaned to the right  She had gained weight, although her caregivers were encouraged to increase her protein intake further  Patients albumin improved to 3 1 on 11/30/17  Patient was very alert when I saw her on 5/24/18  Though she was not as weak as on previous visits, she still leaned to the right on gait  She has left spastic hemiparesis, so I prescribed Baclofen 10 mg BID to see if this would help  Last seen on 5/24/18  INTERVAL HISTORY:     Patient presented to the ED on 6/6/18 after she fell backward and sustained a posterior head laceration  X-ray hips, CT spine, and CT head were all normal  She was given a c-collar to wear and was discharged home  AED level on 7/5/18  Valproic acid, free 9 8  Ammonia <10     Current AEDs:  Depakote 500 mg tablet, 2 tabs in AM (for bipolar disorder)  Baclofen 10 mg tablets, 1 tab BID  Lorazepam 0 5 mg tablet, 1 tab BID    Patient is following up in regards to her gait dysfunction  Patient has been attending physical therapy and has continued to take Baclofen  When she first started the Baclofen, she was tired, but this has since resolved  Patient's caregiver believes that her gait has improved  Her physical therapist was reportedly "on the fence" in regards to the patient needing more Baclofen  She has been given diabetic shoes to wear and a brace was placed on her left leg  She is going to be going to occupational therapy for her left hand spasticity  There is a question of using a splint for this hand  The following portions of the patient's history were reviewed and updated as appropriate: allergies, current medications, past family history, past medical history, past social history, past surgical history and problem list          Objective:    Blood pressure 110/60, pulse 102, height 4' 10" (1 473 m), weight 50 3 kg (111 lb), not currently breastfeeding  Physical Exam   Constitutional: She appears well-developed and well-nourished  HENT:   Head: Normocephalic and atraumatic  Eyes: EOM are normal  Pupils are equal, round, and reactive to light  Neck: Neck supple  Cardiovascular: Normal rate and regular rhythm  Pulmonary/Chest: Effort normal    Neurological: She is alert  Psychiatric:   Slightly agitated but easily redirectable   Vitals reviewed  Neurological Exam    Mental Status  The patient is alert  She has poor attention and poor concentration  She has poor knowledge and has poor comprehension  Moderate intellectual disability  Able to speak spontaneously  Able to follow very simple commands  Cranial Nerves    CN II: The patient's visual acuity and visual fields are normal   CN III, IV, VI: The patient's pupils are equally round and reactive to light and ocular movements are normal   CN V: The patient has normal facial sensation  CN VIII:  The patient's hearing is normal   CN IX, X: The patient has symmetric palate movement and normal gag reflex  Motor   Her overall muscle tone is increased throughout   Specifically, the tone is spastic in the left arm,  She has abnormal movement  Gait and Coordination    Abnormal gait such that she leans to the right  This is improved from last visit such that she can walk faster and is stronger  She still becomes off balances such that she will lift her walker off the ground on the left side and will lean significantly toward the ground  Uses a walker for ambulation  ROS:    Review of Systems   Constitutional: Negative  HENT: Negative  Eyes: Negative  Respiratory: Negative  Cardiovascular: Negative  Gastrointestinal: Negative  Endocrine: Negative  Genitourinary: Negative  Musculoskeletal: Negative  Skin: Negative  Allergic/Immunologic: Negative  Neurological: Negative  Hematological: Negative  Psychiatric/Behavioral: Negative  Counseling Documentation:  Time in: 10:50, Time out: 11:15  Total time encounter was 25 minutes and 20 minutes were spent counseling the patient  Attestation:   By signing my name below, Santy Bhandari, attwellington that this documentation has been prepared under the direction and in the presence of Kate Manule MD  Electronically Signed: Izabel Russell  09/13/18     I, Kate Manuel, personally performed the services described in this documentation  All medical record entries made by the scribe were at my direction and in my presence  I have reviewed the chart and discharge instructions and agree that the record reflects my personal performance and is accurate and complete    Kate Manuel MD  09/13/18

## 2018-09-13 NOTE — PATIENT INSTRUCTIONS
1  Continue Baclofen 10 mg twice daily  2  She is to follow-up with PM&R in regards to her gait from now on

## 2018-09-14 ENCOUNTER — OFFICE VISIT (OUTPATIENT)
Dept: NEUROLOGY | Facility: CLINIC | Age: 58
End: 2018-09-14
Payer: COMMERCIAL

## 2018-09-14 VITALS
HEART RATE: 68 BPM | DIASTOLIC BLOOD PRESSURE: 68 MMHG | SYSTOLIC BLOOD PRESSURE: 102 MMHG | WEIGHT: 112 LBS | BODY MASS INDEX: 23.51 KG/M2 | HEIGHT: 58 IN

## 2018-09-14 DIAGNOSIS — G80.0 SPASTIC QUADRIPLEGIC CEREBRAL PALSY (HCC): Primary | ICD-10-CM

## 2018-09-14 DIAGNOSIS — I69.398 SPASTICITY AS LATE EFFECT OF CEREBROVASCULAR ACCIDENT (CVA): ICD-10-CM

## 2018-09-14 DIAGNOSIS — R25.2 SPASTICITY AS LATE EFFECT OF CEREBROVASCULAR ACCIDENT (CVA): ICD-10-CM

## 2018-09-14 DIAGNOSIS — R26.2 AMBULATORY DYSFUNCTION: ICD-10-CM

## 2018-09-14 PROCEDURE — 99205 OFFICE O/P NEW HI 60 MIN: CPT | Performed by: PHYSICAL MEDICINE & REHABILITATION

## 2018-09-14 RX ORDER — BACLOFEN 10 MG/1
10 TABLET ORAL 3 TIMES DAILY
Qty: 90 TABLET | Refills: 6 | Status: SHIPPED | OUTPATIENT
Start: 2018-09-14 | End: 2019-01-23 | Stop reason: SDUPTHER
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
## 2018-09-14 NOTE — PATIENT INSTRUCTIONS
Please have [facility] therapy contact me when they evaluate for splints or braces - I'm happy to write prescriptions     Recommend in outpatient PT - trial with other walkers to see if she does better?  Platform vs U step walker just as a trial   Please continue gait training and please practice stair negotiation

## 2018-09-26 ENCOUNTER — APPOINTMENT (OUTPATIENT)
Dept: LAB | Facility: CLINIC | Age: 58
End: 2018-09-26
Payer: COMMERCIAL

## 2018-09-26 DIAGNOSIS — E11.9 TYPE 2 DIABETES MELLITUS WITHOUT COMPLICATION, WITHOUT LONG-TERM CURRENT USE OF INSULIN (HCC): ICD-10-CM

## 2018-09-26 DIAGNOSIS — G80.9 CEREBRAL PALSY, UNSPECIFIED TYPE (HCC): ICD-10-CM

## 2018-09-26 DIAGNOSIS — Z11.59 NEED FOR HEPATITIS C SCREENING TEST: ICD-10-CM

## 2018-09-26 LAB
ALBUMIN SERPL BCP-MCNC: 3.4 G/DL (ref 3.5–5)
ALP SERPL-CCNC: 46 U/L (ref 46–116)
ALT SERPL W P-5'-P-CCNC: 17 U/L (ref 12–78)
ANION GAP SERPL CALCULATED.3IONS-SCNC: 6 MMOL/L (ref 4–13)
AST SERPL W P-5'-P-CCNC: 15 U/L (ref 5–45)
BILIRUB SERPL-MCNC: 0.3 MG/DL (ref 0.2–1)
BUN SERPL-MCNC: 13 MG/DL (ref 5–25)
CALCIUM SERPL-MCNC: 9.6 MG/DL (ref 8.3–10.1)
CHLORIDE SERPL-SCNC: 101 MMOL/L (ref 100–108)
CK SERPL-CCNC: 35 U/L (ref 26–192)
CO2 SERPL-SCNC: 33 MMOL/L (ref 21–32)
CREAT SERPL-MCNC: 0.79 MG/DL (ref 0.6–1.3)
CREAT UR-MCNC: 23.8 MG/DL
EST. AVERAGE GLUCOSE BLD GHB EST-MCNC: 134 MG/DL
GFR SERPL CREATININE-BSD FRML MDRD: 84 ML/MIN/1.73SQ M
GLUCOSE P FAST SERPL-MCNC: 123 MG/DL (ref 65–99)
HBA1C MFR BLD: 6.3 % (ref 4.2–6.3)
HCV AB SER QL: NORMAL
MICROALBUMIN UR-MCNC: <5 MG/L (ref 0–20)
MICROALBUMIN/CREAT 24H UR: <21 MG/G CREATININE (ref 0–30)
POTASSIUM SERPL-SCNC: 3.7 MMOL/L (ref 3.5–5.3)
PROT SERPL-MCNC: 7.4 G/DL (ref 6.4–8.2)
SODIUM SERPL-SCNC: 140 MMOL/L (ref 136–145)

## 2018-09-26 PROCEDURE — 83036 HEMOGLOBIN GLYCOSYLATED A1C: CPT

## 2018-09-26 PROCEDURE — 82570 ASSAY OF URINE CREATININE: CPT | Performed by: FAMILY MEDICINE

## 2018-09-26 PROCEDURE — 36415 COLL VENOUS BLD VENIPUNCTURE: CPT

## 2018-09-26 PROCEDURE — 82043 UR ALBUMIN QUANTITATIVE: CPT | Performed by: FAMILY MEDICINE

## 2018-09-26 PROCEDURE — 86803 HEPATITIS C AB TEST: CPT

## 2018-09-26 PROCEDURE — 82550 ASSAY OF CK (CPK): CPT

## 2018-09-26 PROCEDURE — 80053 COMPREHEN METABOLIC PANEL: CPT

## 2018-09-28 DIAGNOSIS — R23.8 SKIN IRRITATION: ICD-10-CM

## 2018-09-28 DIAGNOSIS — B00.1 HERPES LABIALIS: ICD-10-CM

## 2018-09-28 DIAGNOSIS — E78.5 HYPERLIPIDEMIA, UNSPECIFIED HYPERLIPIDEMIA TYPE: ICD-10-CM

## 2018-09-28 DIAGNOSIS — H61.20 EXCESSIVE CERUMEN IN EAR CANAL, UNSPECIFIED LATERALITY: Primary | ICD-10-CM

## 2018-09-28 DIAGNOSIS — E03.9 HYPOTHYROIDISM, UNSPECIFIED TYPE: ICD-10-CM

## 2018-09-28 DIAGNOSIS — M81.0 OSTEOPOROSIS, UNSPECIFIED OSTEOPOROSIS TYPE, UNSPECIFIED PATHOLOGICAL FRACTURE PRESENCE: ICD-10-CM

## 2018-09-28 DIAGNOSIS — F31.9 BIPOLAR AFFECTIVE DISORDER, REMISSION STATUS UNSPECIFIED (HCC): ICD-10-CM

## 2018-10-02 RX ORDER — DIVALPROEX SODIUM 500 MG/1
1000 TABLET, DELAYED RELEASE ORAL DAILY
Refills: 0
Start: 2018-10-02 | End: 2020-08-05 | Stop reason: HOSPADM

## 2018-10-02 RX ORDER — TRAZODONE HYDROCHLORIDE 100 MG/1
TABLET ORAL
Refills: 0
Start: 2018-10-02

## 2018-10-02 RX ORDER — DIAPER,BRIEF,INFANT-TODD,DISP
EACH MISCELLANEOUS
Qty: 30 G | Refills: 0
Start: 2018-10-02 | End: 2020-06-11

## 2018-10-02 RX ORDER — SIMVASTATIN 20 MG
20 TABLET ORAL
Refills: 0
Start: 2018-10-02 | End: 2020-09-18

## 2018-10-02 RX ORDER — LEVOTHYROXINE SODIUM 0.03 MG/1
TABLET ORAL
Refills: 0
Start: 2018-10-02

## 2018-10-02 RX ORDER — DOCOSANOL 100 MG/G
CREAM TOPICAL
Refills: 0
Start: 2018-10-02

## 2018-10-02 RX ORDER — ARIPIPRAZOLE 30 MG/1
TABLET ORAL
Refills: 0
Start: 2018-10-02 | End: 2022-06-29

## 2018-10-29 ENCOUNTER — OFFICE VISIT (OUTPATIENT)
Dept: OBGYN CLINIC | Facility: HOSPITAL | Age: 58
End: 2018-10-29
Payer: COMMERCIAL

## 2018-10-29 VITALS
SYSTOLIC BLOOD PRESSURE: 157 MMHG | HEIGHT: 58 IN | DIASTOLIC BLOOD PRESSURE: 94 MMHG | HEART RATE: 85 BPM | WEIGHT: 104 LBS | BODY MASS INDEX: 21.83 KG/M2

## 2018-10-29 DIAGNOSIS — Z01.419 WOMEN'S ANNUAL ROUTINE GYNECOLOGICAL EXAMINATION: Primary | ICD-10-CM

## 2018-10-29 PROCEDURE — G0101 CA SCREEN;PELVIC/BREAST EXAM: HCPCS | Performed by: NURSE PRACTITIONER

## 2018-10-29 NOTE — PATIENT INSTRUCTIONS
Breast Self Exam for Women   WHAT YOU NEED TO KNOW:   What is a breast self-exam (BSE)? A BSE is a way to check your breasts for lumps and other changes  Regular BSEs can help you know how your breasts normally look and feel  Most breast lumps or changes are not cancer, but you should always have them checked by a healthcare provider  Your healthcare provider can also watch you do a BSE and can tell you if you are doing your BSE correctly  Why should I do a BSE? Breast cancer is the most common type of cancer in women  Even if you have mammograms, you may still want to do a BSE regularly  If you know how your breasts normally feel and look, it may help you know when to contact your healthcare provider  Mammograms can miss some cancers  You may find a lump during a BSE that did not show up on your mammogram   When should I do a BSE? Amandeep your calendar to help you remember to do BSE on a regular schedule  One easy way to remember to do a BSE is to do the exam on the same day of each month  If you have periods, you may want to do your BSE 1 week after your period ends  This is the time when your breasts may be the least swollen, lumpy, or tender  You can do regular BSEs even if you are breastfeeding or have breast implants  How should I do a BSE? · Look at your breasts in a mirror  Look at the size and shape of each breast and nipple  Check for swelling, lumps, dimpling, scaly skin, or other skin changes  Look for nipple changes, such as a nipple that is painful or beginning to pull inward  Gently squeeze both nipples and check to see if fluid (that is not breast milk) comes out of them  If you find any of these or other breast changes, contact your healthcare provider  Check your breasts while you sit or  the following 3 positions:    [facility] your arms down at your sides  ¨ Raise your hands and join them behind your head  ¨ Put firm pressure with your hands on your hips   Bend slightly forward while you look at your breasts in the mirror  · Lie down and feel your breasts  When you lie down, your breast tissue spreads out evenly over your chest  This makes it easier for you to feel for lumps and anything that may not be normal for your breasts  Do a BSE on one breast at a time  ¨ Place a small pillow or towel under your left shoulder  Put your left arm behind your head  ¨ Use the 3 middle fingers of your right hand  Use your fingertip pads, on the top of your fingers  Your fingertip pad is the most sensitive part of your finger  ¨ Use small circles to feel your breast tissue  Use your fingertip pads to make dime-sized, overlapping circles on your breast and armpits  Use light, medium, and firm pressure  First, press lightly  Second, press with medium pressure to feel a little deeper into the breast  Last, use firm pressure to feel deep within your breast     ¨ Examine your entire breast area  Examine the breast area from above the breast to below the breast where you feel only ribs  Make small circles with your fingertips, starting in the middle of your armpit  Make circles going up and down the breast area  Continue toward your breast and all the way across it  Examine the area from your armpit all the way over to the middle of your chest (breastbone)  Stop at the middle of your chest     ¨ Move the pillow or towel to your right shoulder, and put your right arm behind your head  Use the 3 fingertip pads of your left hand, and repeat the above steps to do a BSE on your right breast        What else can I do to check for breast problems or cancer? Some experts suggest that women 36years of age or older should have a mammogram every year  Other experts suggest that women between the ages of 48and 76years old should have a mammogram every 2 years  Talk to your healthcare provider about when you should have a mammogram   When should I contact my healthcare provider?    · You find any lumps or changes in your breasts  · You have breast pain or fluid coming from your nipples  · You have questions or concerns or concerns about your condition or care  CARE AGREEMENT:   You have the right to help plan your care  Learn about your health condition and how it may be treated  Discuss treatment options with your caregivers to decide what care you want to receive  You always have the right to refuse treatment  The above information is an  only  It is not intended as medical advice for individual conditions or treatments  Talk to your doctor, nurse or pharmacist before following any medical regimen to see if it is safe and effective for you  © 2017 [employer]  Information is for End User's use only and may not be sold, redistributed or otherwise used for commercial purposes  All illustrations and images included in CareNotes® are the copyrighted property of A [employer] M , Inc  or [employer]  Pap Smear   WHAT YOU NEED TO KNOW:   What do I need to know about a Pap smear? A Pap smear, or Pap test, is used to screen for cervical cancer  It is also used to find precancerous and cancerous cells of the vulva and vagina  How do I prepare for a Pap smear? The best time to schedule the test is right after your period stops  Do not have a Pap smear during your monthly period  What will happen during a Pap smear? · You will lie on your back and place your feet on footrests called stirrups  Your healthcare provider will gently insert a device called a speculum into your vagina  The speculum is used to open the walls of your vagina so he can see your cervix  · Your healthcare provider will gently scrape your cervix and vaginal areas for cell samples  The samples are placed in a container with liquid or on a glass slide  They are sent to a lab and examined for abnormal cells  A test for Human Papillomavirus (HPV) may be done at the same time   HPV is a sexually transmitted virus that can cause changes in cervical cells  What will happen after a Pap smear? Your healthcare provider will tell you when you can expect your Pap smear results  You may have some spotting the day of your procedure  How often do I need a Pap smear? Pap smears are usually done every 3 to 5 years depending on your age  You may need a Pap smear more often if you have any of the following:  · Positive test result for the human papillomavirus (HPV)    · A history of cervical cancer    · HIV    · A weak immune system    · Exposure to diethylstilbestrol (ALECIA) medicine when your mother was pregnant with you  CARE AGREEMENT:   You have the right to help plan your care  Learn about your health condition and how it may be treated  Discuss treatment options with your caregivers to decide what care you want to receive  You always have the right to refuse treatment  The above information is an  only  It is not intended as medical advice for individual conditions or treatments  Talk to your doctor, nurse or pharmacist before following any medical regimen to see if it is safe and effective for you  © 2017 [employer]  Information is for End User's use only and may not be sold, redistributed or otherwise used for commercial purposes  All illustrations and images included in CareNotes® are the copyrighted property of [employer]  or Thad Jackson

## 2018-10-29 NOTE — PROGRESS NOTES
Subjective      Damaso Lopez is a 66 y o  female with severe intellectual disability who presents for annual exam with caregiver  Last Pap smear 10/19/17 resulted NIML/ HR HPV negative  Last mammogram 18-resulted BI-RADS 1 with recommendations for routine screening in 1 year; managed by PCP  Last DEXA scan 18-managed by PCP  Last colonoscopy 2017  Due to visit GI 2020  The patient has no complaints today  The patient has never been sexually active  The patient is not taking hormone replacement therapy  Patient denies post-menopausal vaginal bleeding  The patient wears seatbelts: yes  The patient participates in regular exercise: yes  The patient reports that there is not domestic violence in her life  Menstrual History:  OB History      Para Term  AB Living    0 0 0 0 0 0    SAB TAB Ectopic Multiple Live Births    0 0 0 0 0         Menarche age: Unsure  Patient's last menstrual period was 2009 (exact date)  The following portions of the patient's history were reviewed and updated as appropriate: allergies, current medications, past family history, past medical history, past social history, past surgical history and problem list     Review of Systems  Pertinent items are noted in HPI       Objective      /94 (BP Location: Left arm, Patient Position: Sitting, Cuff Size: Standard)   Pulse 85   Ht 4' 10" (1 473 m)   Wt 47 2 kg (104 lb)   LMP 2009 (Exact Date)   BMI 21 74 kg/m²     General:   cooperative and distracted, alert in no acute distress   Heart: regular rate and rhythm, S1, S2 normal, no murmur, click, rub or gallop   Lungs: clear to auscultation bilaterally   Abdomen: soft, non-tender, without masses or organomegaly, nondistended and normal bowel sounds   Vulva: normal   Vagina: normal mucosa, normal discharge, no palpable nodules   Cervix: no cervical motion tenderness and no lesions   Uterus: normal size, non-tender, normal shape and consistency   Adnexa: normal adnexa and no mass, fullness, tenderness   Breast:  Nontender, no palpable masses, no nipple discharge, no skin changes bilaterally    Assessment/Plan     Diagnoses and all orders for this visit:    Women's annual routine gynecological examination     - Pap smear due 10/2022     - mammogram due 5/2019     -[id number]    Flu vaccine scheduled for next week     RTO 1 year or sooner as needed

## 2018-11-07 ENCOUNTER — IMMUNIZATION (OUTPATIENT)
Dept: FAMILY MEDICINE CLINIC | Facility: CLINIC | Age: 58
End: 2018-11-07
Payer: COMMERCIAL

## 2018-11-07 DIAGNOSIS — Z23 ENCOUNTER FOR IMMUNIZATION: ICD-10-CM

## 2018-11-07 PROCEDURE — 90682 RIV4 VACC RECOMBINANT DNA IM: CPT

## 2018-11-07 PROCEDURE — G0008 ADMIN INFLUENZA VIRUS VAC: HCPCS

## 2018-11-29 ENCOUNTER — OFFICE VISIT (OUTPATIENT)
Dept: FAMILY MEDICINE CLINIC | Facility: CLINIC | Age: 58
End: 2018-11-29
Payer: COMMERCIAL

## 2018-11-29 VITALS
WEIGHT: 105.2 LBS | RESPIRATION RATE: 16 BRPM | BODY MASS INDEX: 22.08 KG/M2 | SYSTOLIC BLOOD PRESSURE: 120 MMHG | DIASTOLIC BLOOD PRESSURE: 80 MMHG | HEART RATE: 78 BPM | TEMPERATURE: 95.7 F | HEIGHT: 58 IN

## 2018-11-29 DIAGNOSIS — E11.9 TYPE 2 DIABETES MELLITUS WITHOUT COMPLICATION, WITHOUT LONG-TERM CURRENT USE OF INSULIN (HCC): Primary | ICD-10-CM

## 2018-11-29 DIAGNOSIS — M81.0 OSTEOPOROSIS, UNSPECIFIED OSTEOPOROSIS TYPE, UNSPECIFIED PATHOLOGICAL FRACTURE PRESENCE: ICD-10-CM

## 2018-11-29 DIAGNOSIS — I10 ESSENTIAL HYPERTENSION: ICD-10-CM

## 2018-11-29 DIAGNOSIS — E03.9 HYPOTHYROIDISM, UNSPECIFIED TYPE: ICD-10-CM

## 2018-11-29 PROBLEM — R09.02 HYPOXIA: Status: RESOLVED | Noted: 2017-03-27 | Resolved: 2018-11-29

## 2018-11-29 PROBLEM — R30.0 DIFFICULT OR PAINFUL URINATION: Status: RESOLVED | Noted: 2017-03-06 | Resolved: 2018-11-29

## 2018-11-29 PROBLEM — H57.89 EYE REDNESS: Status: RESOLVED | Noted: 2017-06-29 | Resolved: 2018-11-29
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
## 2018-11-29 PROCEDURE — 3008F BODY MASS INDEX DOCD: CPT | Performed by: FAMILY MEDICINE

## 2018-11-29 PROCEDURE — 3079F DIAST BP 80-89 MM HG: CPT | Performed by: FAMILY MEDICINE

## 2018-11-29 PROCEDURE — 1036F TOBACCO NON-USER: CPT | Performed by: FAMILY MEDICINE

## 2018-11-29 PROCEDURE — 99213 OFFICE O/P EST LOW 20 MIN: CPT | Performed by: FAMILY MEDICINE

## 2018-11-29 PROCEDURE — 3074F SYST BP LT 130 MM HG: CPT | Performed by: FAMILY MEDICINE

## 2018-11-29 NOTE — PROGRESS NOTES
Lb Morris 1960 female MRN: [id number]    Family Medicine Follow-up Visit    SUBJECTIVE    CC: Follow-up (chronic and prolia )    HPI:  Lb Morris is a 66 y o  female who presented for a follow-up of HPI    Patient has had no major interval events  Has been to see her cardiologist - not on any anti-hypertensives  No refills needed at this time  Did have acute illness which is now completely resolved  Loose bowels, fatigue for 2 days  Review of Systems   Constitutional: Negative for activity change, chills and fever  HENT: Negative for congestion, rhinorrhea and sore throat  Eyes: Negative for visual disturbance  Respiratory: Negative for cough, shortness of breath and wheezing  Cardiovascular: Negative for chest pain and palpitations  Gastrointestinal: Negative for abdominal pain, blood in stool, constipation, diarrhea, nausea and vomiting  Genitourinary: Negative for dysuria  Musculoskeletal: Negative for arthralgias and myalgias  Skin: Negative for rash  Neurological: Negative for dizziness, weakness and headaches  All other systems reviewed and are negative        Historical Information     The patient history was reviewed as follows:    Past Medical History:   Diagnosis Date    Adjustment disorder     Anemia     Bipolar 1 disorder (HCC)     Cerebral palsy (HCC)     Chronic hypernatremia 2/6/2016    Closed fracture of left hip ([facility] ) 1/19/2016    [facility]     no surgery    Constipation     Diabetes mellitus ([facility] )     Disease of thyroid gland     Diverticulosis     Fracture of multiple ribs of right side     [facility] 07/26/2015    left    Hyperlipidemia     Hypotension     Impulse control disorder     Incontinence     Intellectual disability due to developmental disorder, unspecified     Microalbuminuria     Osteopathia     Osteoporosis     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HCC)     Thrombocytopenia ([facility] ) 7/31/2015     Past Surgical History:   Procedure Laterality Date    ABDOMINAL SURGERY      COLECTOMY MIN      re-anastomosis 7/22/16    COLONOSCOPY N/A 7/21/2016    Procedure: COLONOSCOPY;  Surgeon: Donte Milton MD;  [facility]; Service:     COLONOSCOPY N/A 1/31/2016    Procedure: COLONOSCOPY;  Surgeon: Donte Milton MD;  [facility];  Service:     COLONOSCOPY N/A 7/25/2017    Procedure: COLONOSCOPY;  Surgeon: Donte Milton MD;  [facility];   Service: Colorectal    COLOSTOMY      EXPLORATORY LAPAROTOMY W/ BOWEL RESECTION N/A 1/31/2016    Procedure: exploratory laparotomy, left sigmoidectomy, coloproctostomy, take down splenic flexure, loop colostomy;  Surgeon: Donte Milton MD;  [facility];  Service:     [place] CLOSE ENTEROSTOMY N/A 7/22/2016    Procedure: SEGMENTAL COLECTOMY WITH COLOCOLOSTOMY;  Surgeon: Donte Milton MD;  [facility];  Service: Colorectal     Family History   Problem Relation Age of Onset    Alcohol abuse Mother     Alcohol abuse Father     Diabetes Sister       Social History   History   Alcohol Use No     History   Drug Use No     History   Smoking Status    Never Smoker   Smokeless Tobacco    Never Used       Medications:   Meds/Allergies     Current Outpatient Prescriptions:     acetaminophen (TYLENOL) 325 mg tablet, Take 2 tablets (650 mg total) by mouth every 6 (six) hours as needed (pain, fever greater than 100 4), Disp: 240 tablet, Rfl: 0    ARIPiprazole (ABILIFY) 30 mg tablet, Take 1 tablet (30 mg) by mouth daily at 8 pm, Disp: , Rfl: 0    baclofen 10 mg tablet, Take 1 tablet (10 mg total) by mouth 3 (three) times a day, Disp: 90 tablet, Rfl: 6    calcium carbonate 1250 MG capsule, Take 500 mg by mouth daily  , Disp: , Rfl:     carbamide peroxide (DEBROX) 6 5 % otic solution, Instill 2 drops daily x5 days to affected ear as needed for cerumen, Disp: 15 mL, Rfl: 0    Cholecalciferol (VITAMIN D) 2000 UNITS tablet, Take 2,000 Units by mouth daily  , Disp: , Rfl:     Citalopram Hydrobromide (CELEXA PO), Take 40 mg by mouth every morning  , Disp: , Rfl:     denosumab (PROLIA) 60 mg/mL, Inject 1 mL (60 mg total) under the skin once for 1 dose every 6 months at [facility] for osteoporosis, Disp: 1 mL, Rfl: 0    Diaper Rash Products (A+D PREVENT) OINT, Apply topically, Disp: , Rfl:     divalproex sodium (DEPAKOTE) 500 mg EC tablet, Take 2 tablets (1,000 mg total) by mouth daily every morning for bipolar, Disp: , Rfl: 0    docosanol (ABREVA) 10 %, Apply topically 5 (five) times a day Apply and rub in 5x a day until healed as needed for lesion, Disp: , Rfl: 0    Docusate Sodium (COLACE PO), Take 100 mg by mouth 2 (two) times a day Hold one day for loose stools  , Disp: , Rfl:     ferrous sulfate 324 (65 Fe) mg, Take 325 mg by mouth 2 (two) times a day , Disp: , Rfl:     fexofenadine (ALLEGRA) 180 MG tablet, Take 180 mg by mouth as needed  , Disp: , Rfl:     hydrocortisone 1 % cream, Apply topically to affected area twice daily as needed for rash, Disp: 30 g, Rfl: 0    levothyroxine (SYNTHROID) 25 mcg tablet, Take 1 tablet (25 mcg) by mouth every morning, Disp: , Rfl: 0    LORazepam (ATIVAN) 0 5 mg tablet, Take 0 5 mg by mouth 2 (two) times a day   , Disp: , Rfl:     MetFORMIN HCl (GLUCOPHAGE PO), Take 1,000 mg by mouth 2 (two) times a day , Disp: , Rfl:     miconazole (NEOSPORIN AF) 2 % cream, Apply topically 2 (two) times a day as needed, Disp: , Rfl:     nitrofurantoin (MACRODANTIN) 50 mg capsule, Take 1 capsule by mouth Bedtime   , Disp: , Rfl:     olopatadine HCl (PATADAY) 0 2 % opth drops, Administer 1 drop to both eyes as needed, Disp: , Rfl:     omeprazole (PriLOSEC) 20 mg delayed release capsule, Take 20 mg by mouth daily, Disp: , Rfl:     Oxybutynin Chloride (DITROPAN XL PO), Take 5 mg by mouth 3 (three) times a day   , Disp: , Rfl:     Polyethylene Glycol 3350 (MIRALAX PO), Take 17 g by mouth every morning  , Disp: , Rfl:    polyvinyl alcohol-povidone (REFRESH) 1 4-0 6 % ophthalmic solution, Apply to eye, Disp: , Rfl:     simvastatin (ZOCOR) 20 mg tablet, Take 1 tablet (20 mg total) by mouth daily at bedtime at 4 pm for hyperlipidemia, Disp: , Rfl: 0    traZODone (DESYREL) 100 mg tablet, Take 1 tablet (100 mg total) by mouth daily at  8 pm for depression, Disp: , Rfl: 0  No Known Allergies    OBJECTIVE    Vitals:   Vitals:    11/29/18 1328   BP: 120/80   Pulse: 78   Resp: 16   Temp: (!) 95 7 °F (35 4 °C)   Weight: 47 7 kg (105 lb 3 2 oz)   Height: 4' 10" (1 473 m)     Wt Readings from Last 3 Encounters:   11/29/18 47 7 kg (105 lb 3 2 oz)   10/29/18 47 2 kg (104 lb)   09/14/18 50 8 kg (112 lb)     Body mass index is 21 99 kg/m²  Temp Readings from Last 3 Encounters:   11/29/18 (!) 95 7 °F (35 4 °C)   06/26/18 97 9 °F (36 6 °C) (Tympanic)   06/08/18 (!) 95 6 °F (35 3 °C)     BP Readings from Last 3 Encounters:   11/29/18 120/80   10/29/18 157/94   09/14/18 102/68     Pulse Readings from Last 3 Encounters:   11/29/18 78   10/29/18 85   09/14/18 68     Patient's last menstrual period was 11/29/2009 (exact date)  Physical Exam:    Physical Exam   Constitutional: Vital signs are normal  She appears well-developed and well-nourished  She does not appear ill  No distress  HENT:   Head: Normocephalic and atraumatic  Right Ear: External ear normal    Left Ear: External ear normal    Nose: Nose normal    Eyes: Conjunctivae, EOM and lids are normal    Neck: No JVD present  No tracheal deviation present  Cardiovascular: Intact distal pulses  Pulmonary/Chest: No accessory muscle usage  No respiratory distress  Abdominal: Normal appearance  Musculoskeletal: She exhibits no edema  Neurological: She is alert  Skin: No rash noted  She is not diaphoretic  Psychiatric: Her affect is blunt  She is slowed and withdrawn  She is noncommunicative  Nursing note and vitals reviewed  Labs:   I have personally reviewed all pertinent results  Imaging:  I have personally reviewed all pertinent results  Assessment/Plan   HTN (hypertension)  At goal today  Follows with cardiology, hypotensive and has stopped all anti-hypertensives    Type 2 diabetes mellitus, without long-term current use of insulin ([address] )  Lab Results   Component Value Date    HGBA1C 6 3 09/26/2018     Recent labs reviewed  Patient currently at goal A1c <7  Continue current Rx: metformin 1000mg BID     Hypothyroidism  TSH 2 959 at most recent lab check  Continue current Rx levothyroxine 25mcg QD    Basil Montoya was seen today for follow-up  Diagnoses and all orders for this visit:    Type 2 diabetes mellitus without complication, without long-term current use of insulin (HCC)    Essential hypertension    Osteoporosis, unspecified osteoporosis type, unspecified pathological fracture presence    Hypothyroidism, unspecified type      - PCP: Jason Flaherty MD  - Follow-up appointments:     Future Appointments  Date Time Provider [place]   1/23/2019 8:30 AM Edenilson Padron MD NEURO [facility] [place]     _____________________________________________________________________   The attending physician, Dr Mariah Sotelo, agreed with the plan      Jaun Villarreal MD, PGY-3  [facility]   11/29/2018

## 2018-11-29 NOTE — PATIENT INSTRUCTIONS

## 2018-11-29 NOTE — ASSESSMENT & PLAN NOTE
Lab Results   Component Value Date    HGBA1C 6 3 09/26/2018     Recent labs reviewed  Patient currently at goal A1c <7  Continue current Rx: metformin 1000mg BID

## 2018-12-04 ENCOUNTER — TELEPHONE (OUTPATIENT)
Dept: FAMILY MEDICINE CLINIC | Facility: CLINIC | Age: 58
End: 2018-12-04

## 2018-12-04 DIAGNOSIS — M81.0 OSTEOPOROSIS, UNSPECIFIED OSTEOPOROSIS TYPE, UNSPECIFIED PATHOLOGICAL FRACTURE PRESENCE: Primary | ICD-10-CM

## 2018-12-04 NOTE — TELEPHONE ENCOUNTER
I have signed an order for hti spt to have BMP post Prolia injection   I have faxed the order for BMP for pt to have done around 12/13/18

## 2018-12-17 ENCOUNTER — APPOINTMENT (EMERGENCY)
Dept: RADIOLOGY | Facility: HOSPITAL | Age: 58
DRG: 193 | End: 2018-12-17
Payer: COMMERCIAL

## 2018-12-17 ENCOUNTER — HOSPITAL ENCOUNTER (INPATIENT)
Facility: HOSPITAL | Age: 58
LOS: 3 days | Discharge: HOME | DRG: 193 | End: 2018-12-20
Attending: EMERGENCY MEDICINE | Admitting: INTERNAL MEDICINE
Payer: COMMERCIAL

## 2018-12-17 DIAGNOSIS — A41.9 SEVERE SEPSIS (HCC): ICD-10-CM

## 2018-12-17 DIAGNOSIS — R05.9 COUGH: ICD-10-CM

## 2018-12-17 DIAGNOSIS — R65.20 SEVERE SEPSIS (HCC): ICD-10-CM

## 2018-12-17 DIAGNOSIS — R50.9 FEVER: Primary | ICD-10-CM

## 2018-12-17 DIAGNOSIS — N39.0 UTI (URINARY TRACT INFECTION): ICD-10-CM

## 2018-12-17 PROBLEM — R65.10 SIRS (SYSTEMIC INFLAMMATORY RESPONSE SYNDROME) (HCC): Status: ACTIVE | Noted: 2018-12-17

## 2018-12-17 LAB
ALBUMIN SERPL BCP-MCNC: 3 G/DL (ref 3.5–5)
ALP SERPL-CCNC: 51 U/L (ref 46–116)
ALT SERPL W P-5'-P-CCNC: 16 U/L (ref 12–78)
ANION GAP SERPL CALCULATED.3IONS-SCNC: 9 MMOL/L (ref 4–13)
APTT PPP: 26 SECONDS (ref 26–38)
AST SERPL W P-5'-P-CCNC: 28 U/L (ref 5–45)
BACTERIA UR QL AUTO: ABNORMAL /HPF
BASOPHILS # BLD AUTO: 0.05 THOUSANDS/ΜL (ref 0–0.1)
BASOPHILS NFR BLD AUTO: 1 % (ref 0–1)
BILIRUB SERPL-MCNC: 0.4 MG/DL (ref 0.2–1)
BILIRUB UR QL STRIP: NEGATIVE
BUN SERPL-MCNC: 18 MG/DL (ref 5–25)
CALCIUM SERPL-MCNC: 10.2 MG/DL (ref 8.3–10.1)
CHLORIDE SERPL-SCNC: 103 MMOL/L (ref 100–108)
CLARITY UR: CLEAR
CO2 SERPL-SCNC: 30 MMOL/L (ref 21–32)
COLOR UR: YELLOW
CREAT SERPL-MCNC: 1.02 MG/DL (ref 0.6–1.3)
EOSINOPHIL # BLD AUTO: 0.06 THOUSAND/ΜL (ref 0–0.61)
EOSINOPHIL NFR BLD AUTO: 1 % (ref 0–6)
ERYTHROCYTE [DISTWIDTH] IN BLOOD BY AUTOMATED COUNT: 13.2 % (ref 11.6–15.1)
FLUAV AG SPEC QL IA: NEGATIVE
FLUAV AG SPEC QL: NORMAL
FLUBV AG SPEC QL IA: NEGATIVE
FLUBV AG SPEC QL: NORMAL
GFR SERPL CREATININE-BSD FRML MDRD: 62 ML/MIN/1.73SQ M
GLUCOSE SERPL-MCNC: 115 MG/DL (ref 65–140)
GLUCOSE SERPL-MCNC: 187 MG/DL (ref 65–140)
GLUCOSE UR STRIP-MCNC: NEGATIVE MG/DL
HCT VFR BLD AUTO: 38.3 % (ref 34.8–46.1)
HGB BLD-MCNC: 12.5 G/DL (ref 11.5–15.4)
HGB UR QL STRIP.AUTO: NEGATIVE
IMM GRANULOCYTES # BLD AUTO: 0.01 THOUSAND/UL (ref 0–0.2)
IMM GRANULOCYTES NFR BLD AUTO: 0 % (ref 0–2)
INR PPP: 0.95 (ref 0.86–1.17)
KETONES UR STRIP-MCNC: ABNORMAL MG/DL
LACTATE SERPL-SCNC: 3.2 MMOL/L (ref 0.5–2)
LEUKOCYTE ESTERASE UR QL STRIP: ABNORMAL
LYMPHOCYTES # BLD AUTO: 1.45 THOUSANDS/ΜL (ref 0.6–4.47)
LYMPHOCYTES NFR BLD AUTO: 18 % (ref 14–44)
MCH RBC QN AUTO: 34.4 PG (ref 26.8–34.3)
MCHC RBC AUTO-ENTMCNC: 32.6 G/DL (ref 31.4–37.4)
MCV RBC AUTO: 106 FL (ref 82–98)
MONOCYTES # BLD AUTO: 1.07 THOUSAND/ΜL (ref 0.17–1.22)
MONOCYTES NFR BLD AUTO: 13 % (ref 4–12)
NEUTROPHILS # BLD AUTO: 5.39 THOUSANDS/ΜL (ref 1.85–7.62)
NEUTS SEG NFR BLD AUTO: 67 % (ref 43–75)
NITRITE UR QL STRIP: POSITIVE
NON-SQ EPI CELLS URNS QL MICRO: ABNORMAL /HPF
NRBC BLD AUTO-RTO: 0 /100 WBCS
PH UR STRIP.AUTO: 7 [PH] (ref 4.5–8)
PLATELET # BLD AUTO: 118 THOUSANDS/UL (ref 149–390)
PLATELET # BLD AUTO: 156 THOUSANDS/UL (ref 149–390)
PMV BLD AUTO: 11.5 FL (ref 8.9–12.7)
PMV BLD AUTO: 11.9 FL (ref 8.9–12.7)
POTASSIUM SERPL-SCNC: 5.4 MMOL/L (ref 3.5–5.3)
PROCALCITONIN SERPL-MCNC: 0.66 NG/ML
PROT SERPL-MCNC: 7.7 G/DL (ref 6.4–8.2)
PROT UR STRIP-MCNC: NEGATIVE MG/DL
PROTHROMBIN TIME: 12.4 SECONDS (ref 11.8–14.2)
RBC # BLD AUTO: 3.63 MILLION/UL (ref 3.81–5.12)
RBC #/AREA URNS AUTO: ABNORMAL /HPF
RSV B RNA SPEC QL NAA+PROBE: NORMAL
SODIUM SERPL-SCNC: 142 MMOL/L (ref 136–145)
SP GR UR STRIP.AUTO: 1.01 (ref 1–1.03)
UROBILINOGEN UR QL STRIP.AUTO: 0.2 E.U./DL
WBC # BLD AUTO: 8.03 THOUSAND/UL (ref 4.31–10.16)
WBC #/AREA URNS AUTO: ABNORMAL /HPF

## 2018-12-17 PROCEDURE — 93005 ELECTROCARDIOGRAM TRACING: CPT

## 2018-12-17 PROCEDURE — 36415 COLL VENOUS BLD VENIPUNCTURE: CPT | Performed by: EMERGENCY MEDICINE

## 2018-12-17 PROCEDURE — 85049 AUTOMATED PLATELET COUNT: CPT | Performed by: PHYSICIAN ASSISTANT

## 2018-12-17 PROCEDURE — 80053 COMPREHEN METABOLIC PANEL: CPT | Performed by: EMERGENCY MEDICINE

## 2018-12-17 PROCEDURE — 96375 TX/PRO/DX INJ NEW DRUG ADDON: CPT

## 2018-12-17 PROCEDURE — 87040 BLOOD CULTURE FOR BACTERIA: CPT | Performed by: EMERGENCY MEDICINE

## 2018-12-17 PROCEDURE — 94664 DEMO&/EVAL PT USE INHALER: CPT

## 2018-12-17 PROCEDURE — 85730 THROMBOPLASTIN TIME PARTIAL: CPT | Performed by: EMERGENCY MEDICINE

## 2018-12-17 PROCEDURE — 87631 RESP VIRUS 3-5 TARGETS: CPT | Performed by: EMERGENCY MEDICINE

## 2018-12-17 PROCEDURE — 87186 SC STD MICRODIL/AGAR DIL: CPT | Performed by: EMERGENCY MEDICINE

## 2018-12-17 PROCEDURE — 99285 EMERGENCY DEPT VISIT HI MDM: CPT

## 2018-12-17 PROCEDURE — 85610 PROTHROMBIN TIME: CPT | Performed by: EMERGENCY MEDICINE

## 2018-12-17 PROCEDURE — 94760 N-INVAS EAR/PLS OXIMETRY 1: CPT

## 2018-12-17 PROCEDURE — 87077 CULTURE AEROBIC IDENTIFY: CPT | Performed by: EMERGENCY MEDICINE

## 2018-12-17 PROCEDURE — 82948 REAGENT STRIP/BLOOD GLUCOSE: CPT

## 2018-12-17 PROCEDURE — 83605 ASSAY OF LACTIC ACID: CPT | Performed by: EMERGENCY MEDICINE

## 2018-12-17 PROCEDURE — 81001 URINALYSIS AUTO W/SCOPE: CPT | Performed by: EMERGENCY MEDICINE

## 2018-12-17 PROCEDURE — 71045 X-RAY EXAM CHEST 1 VIEW: CPT

## 2018-12-17 PROCEDURE — 99222 1ST HOSP IP/OBS MODERATE 55: CPT | Performed by: PHYSICIAN ASSISTANT

## 2018-12-17 PROCEDURE — 84145 PROCALCITONIN (PCT): CPT | Performed by: PHYSICIAN ASSISTANT

## 2018-12-17 PROCEDURE — 84145 PROCALCITONIN (PCT): CPT | Performed by: EMERGENCY MEDICINE

## 2018-12-17 PROCEDURE — 96365 THER/PROPH/DIAG IV INF INIT: CPT

## 2018-12-17 PROCEDURE — 85025 COMPLETE CBC W/AUTO DIFF WBC: CPT | Performed by: EMERGENCY MEDICINE

## 2018-12-17 PROCEDURE — 87086 URINE CULTURE/COLONY COUNT: CPT | Performed by: EMERGENCY MEDICINE

## 2018-12-17 RX ORDER — DOCOSANOL 100 MG/G
CREAM TOPICAL
Status: DISCONTINUED | OUTPATIENT
Start: 2018-12-17 | End: 2018-12-20 | Stop reason: HOSPADM

## 2018-12-17 RX ORDER — TRAZODONE HYDROCHLORIDE 100 MG/1
100 TABLET ORAL
Status: DISCONTINUED | OUTPATIENT
Start: 2018-12-17 | End: 2018-12-20 | Stop reason: HOSPADM

## 2018-12-17 RX ORDER — LEVOTHYROXINE SODIUM 0.03 MG/1
25 TABLET ORAL
Status: DISCONTINUED | OUTPATIENT
Start: 2018-12-18 | End: 2018-12-20 | Stop reason: HOSPADM

## 2018-12-17 RX ORDER — DOCUSATE SODIUM 100 MG/1
100 CAPSULE, LIQUID FILLED ORAL 2 TIMES DAILY
Status: DISCONTINUED | OUTPATIENT
Start: 2018-12-17 | End: 2018-12-20 | Stop reason: HOSPADM

## 2018-12-17 RX ORDER — PRAVASTATIN SODIUM 40 MG
40 TABLET ORAL
Status: DISCONTINUED | OUTPATIENT
Start: 2018-12-18 | End: 2018-12-20 | Stop reason: HOSPADM

## 2018-12-17 RX ORDER — LEVALBUTEROL INHALATION SOLUTION 0.63 MG/3ML
0.63 SOLUTION RESPIRATORY (INHALATION) ONCE
Status: DISCONTINUED | OUTPATIENT
Start: 2018-12-17 | End: 2018-12-18

## 2018-12-17 RX ORDER — SODIUM CHLORIDE 9 MG/ML
125 INJECTION, SOLUTION INTRAVENOUS CONTINUOUS
Status: DISCONTINUED | OUTPATIENT
Start: 2018-12-17 | End: 2018-12-19

## 2018-12-17 RX ORDER — LORAZEPAM 2 MG/ML
0.5 INJECTION INTRAMUSCULAR ONCE
Status: DISCONTINUED | OUTPATIENT
Start: 2018-12-17 | End: 2018-12-17

## 2018-12-17 RX ORDER — POLYVINYL ALCOHOL 14 MG/ML
1 SOLUTION/ DROPS OPHTHALMIC AS NEEDED
Status: DISCONTINUED | OUTPATIENT
Start: 2018-12-17 | End: 2018-12-20 | Stop reason: HOSPADM

## 2018-12-17 RX ORDER — ONDANSETRON 2 MG/ML
4 INJECTION INTRAMUSCULAR; INTRAVENOUS EVERY 6 HOURS PRN
Status: DISCONTINUED | OUTPATIENT
Start: 2018-12-17 | End: 2018-12-20 | Stop reason: HOSPADM

## 2018-12-17 RX ORDER — POLYETHYLENE GLYCOL 3350 17 G/17G
17 POWDER, FOR SOLUTION ORAL DAILY
Status: DISCONTINUED | OUTPATIENT
Start: 2018-12-18 | End: 2018-12-20 | Stop reason: HOSPADM

## 2018-12-17 RX ORDER — MELATONIN
2000 DAILY
Status: DISCONTINUED | OUTPATIENT
Start: 2018-12-18 | End: 2018-12-20 | Stop reason: HOSPADM

## 2018-12-17 RX ORDER — LORAZEPAM 0.5 MG/1
0.5 TABLET ORAL 2 TIMES DAILY
Status: DISCONTINUED | OUTPATIENT
Start: 2018-12-17 | End: 2018-12-20 | Stop reason: HOSPADM

## 2018-12-17 RX ORDER — OXYBUTYNIN CHLORIDE 5 MG/1
5 TABLET, EXTENDED RELEASE ORAL 3 TIMES DAILY
Status: DISCONTINUED | OUTPATIENT
Start: 2018-12-17 | End: 2018-12-17 | Stop reason: ALTCHOICE

## 2018-12-17 RX ORDER — 0.9 % SODIUM CHLORIDE 0.9 %
3 VIAL (ML) INJECTION AS NEEDED
Status: DISCONTINUED | OUTPATIENT
Start: 2018-12-17 | End: 2018-12-17

## 2018-12-17 RX ORDER — CITALOPRAM 20 MG/1
40 TABLET ORAL EVERY MORNING
Status: DISCONTINUED | OUTPATIENT
Start: 2018-12-18 | End: 2018-12-20 | Stop reason: HOSPADM

## 2018-12-17 RX ORDER — DIVALPROEX SODIUM 500 MG/1
1000 TABLET, DELAYED RELEASE ORAL DAILY
Status: DISCONTINUED | OUTPATIENT
Start: 2018-12-18 | End: 2018-12-20 | Stop reason: HOSPADM

## 2018-12-17 RX ORDER — ACETAMINOPHEN 325 MG/1
650 TABLET ORAL EVERY 6 HOURS PRN
Status: DISCONTINUED | OUTPATIENT
Start: 2018-12-17 | End: 2018-12-20 | Stop reason: HOSPADM

## 2018-12-17 RX ORDER — BACLOFEN 10 MG/1
10 TABLET ORAL 3 TIMES DAILY
Status: DISCONTINUED | OUTPATIENT
Start: 2018-12-17 | End: 2018-12-20 | Stop reason: HOSPADM

## 2018-12-17 RX ORDER — LORATADINE 10 MG/1
10 TABLET ORAL DAILY
Status: DISCONTINUED | OUTPATIENT
Start: 2018-12-18 | End: 2018-12-20 | Stop reason: HOSPADM

## 2018-12-17 RX ORDER — ACETAMINOPHEN 325 MG/1
650 TABLET ORAL EVERY 6 HOURS PRN
Status: DISCONTINUED | OUTPATIENT
Start: 2018-12-17 | End: 2018-12-17 | Stop reason: SDUPTHER

## 2018-12-17 RX ORDER — OXYBUTYNIN CHLORIDE 5 MG/1
5 TABLET ORAL 3 TIMES DAILY
Status: DISCONTINUED | OUTPATIENT
Start: 2018-12-18 | End: 2018-12-20 | Stop reason: HOSPADM

## 2018-12-17 RX ORDER — PANTOPRAZOLE SODIUM 40 MG/1
40 TABLET, DELAYED RELEASE ORAL
Status: DISCONTINUED | OUTPATIENT
Start: 2018-12-18 | End: 2018-12-20 | Stop reason: HOSPADM

## 2018-12-17 RX ORDER — FERROUS SULFATE 325(65) MG
325 TABLET ORAL 2 TIMES DAILY WITH MEALS
Status: DISCONTINUED | OUTPATIENT
Start: 2018-12-18 | End: 2018-12-20 | Stop reason: HOSPADM

## 2018-12-17 RX ORDER — ARIPIPRAZOLE 10 MG/1
30 TABLET ORAL DAILY
Status: DISCONTINUED | OUTPATIENT
Start: 2018-12-17 | End: 2018-12-20 | Stop reason: HOSPADM

## 2018-12-17 RX ADMIN — ARIPIPRAZOLE 30 MG: 10 TABLET ORAL at 22:47

## 2018-12-17 RX ADMIN — LORAZEPAM 0.5 MG: 0.5 TABLET ORAL at 22:36

## 2018-12-17 RX ADMIN — BACLOFEN 10 MG: 10 TABLET ORAL at 22:36

## 2018-12-17 RX ADMIN — CEFTRIAXONE SODIUM 2000 MG: 2 INJECTION, POWDER, FOR SOLUTION INTRAMUSCULAR; INTRAVENOUS at 19:23

## 2018-12-17 RX ADMIN — SODIUM CHLORIDE 1000 ML: 0.9 INJECTION, SOLUTION INTRAVENOUS at 22:51

## 2018-12-17 RX ADMIN — AZITHROMYCIN MONOHYDRATE 500 MG: 500 INJECTION, POWDER, LYOPHILIZED, FOR SOLUTION INTRAVENOUS at 20:17

## 2018-12-17 RX ADMIN — TRAZODONE HYDROCHLORIDE 100 MG: 100 TABLET ORAL at 22:36

## 2018-12-17 RX ADMIN — SODIUM CHLORIDE 1000 ML: 0.9 INJECTION, SOLUTION INTRAVENOUS at 19:12

## 2018-12-17 RX ADMIN — SODIUM CHLORIDE 125 ML/HR: 0.9 INJECTION, SOLUTION INTRAVENOUS at 23:43

## 2018-12-17 RX ADMIN — ACETAMINOPHEN 650 MG: 325 TABLET, FILM COATED ORAL at 22:41

## 2018-12-17 RX ADMIN — DOCUSATE SODIUM 100 MG: 100 CAPSULE, LIQUID FILLED ORAL at 22:36

## 2018-12-18 LAB
ANION GAP SERPL CALCULATED.3IONS-SCNC: 12 MMOL/L (ref 4–13)
ATRIAL RATE: 93 BPM
BASOPHILS # BLD AUTO: 0.02 THOUSANDS/ΜL (ref 0–0.1)
BASOPHILS NFR BLD AUTO: 0 % (ref 0–1)
BUN SERPL-MCNC: 14 MG/DL (ref 5–25)
CALCIUM SERPL-MCNC: 8.3 MG/DL (ref 8.3–10.1)
CHLORIDE SERPL-SCNC: 110 MMOL/L (ref 100–108)
CO2 SERPL-SCNC: 25 MMOL/L (ref 21–32)
CREAT SERPL-MCNC: 0.96 MG/DL (ref 0.6–1.3)
EOSINOPHIL # BLD AUTO: 0.01 THOUSAND/ΜL (ref 0–0.61)
EOSINOPHIL NFR BLD AUTO: 0 % (ref 0–6)
ERYTHROCYTE [DISTWIDTH] IN BLOOD BY AUTOMATED COUNT: 13.2 % (ref 11.6–15.1)
GFR SERPL CREATININE-BSD FRML MDRD: 66 ML/MIN/1.73SQ M
GLUCOSE SERPL-MCNC: 106 MG/DL (ref 65–140)
GLUCOSE SERPL-MCNC: 125 MG/DL (ref 65–140)
GLUCOSE SERPL-MCNC: 128 MG/DL (ref 65–140)
GLUCOSE SERPL-MCNC: 129 MG/DL (ref 65–140)
GLUCOSE SERPL-MCNC: 182 MG/DL (ref 65–140)
GLUCOSE SERPL-MCNC: 74 MG/DL (ref 65–140)
HCT VFR BLD AUTO: 29.5 % (ref 34.8–46.1)
HGB BLD-MCNC: 9.4 G/DL (ref 11.5–15.4)
IMM GRANULOCYTES # BLD AUTO: 0.02 THOUSAND/UL (ref 0–0.2)
IMM GRANULOCYTES NFR BLD AUTO: 0 % (ref 0–2)
LACTATE SERPL-SCNC: 1.4 MMOL/L (ref 0.5–2)
LACTATE SERPL-SCNC: 2.1 MMOL/L (ref 0.5–2)
LACTATE SERPL-SCNC: 2.3 MMOL/L (ref 0.5–2)
LACTATE SERPL-SCNC: 2.5 MMOL/L (ref 0.5–2)
LYMPHOCYTES # BLD AUTO: 1.68 THOUSANDS/ΜL (ref 0.6–4.47)
LYMPHOCYTES NFR BLD AUTO: 29 % (ref 14–44)
MCH RBC QN AUTO: 33.8 PG (ref 26.8–34.3)
MCHC RBC AUTO-ENTMCNC: 31.9 G/DL (ref 31.4–37.4)
MCV RBC AUTO: 106 FL (ref 82–98)
MONOCYTES # BLD AUTO: 0.76 THOUSAND/ΜL (ref 0.17–1.22)
MONOCYTES NFR BLD AUTO: 13 % (ref 4–12)
NEUTROPHILS # BLD AUTO: 3.35 THOUSANDS/ΜL (ref 1.85–7.62)
NEUTS SEG NFR BLD AUTO: 58 % (ref 43–75)
NRBC BLD AUTO-RTO: 0 /100 WBCS
P AXIS: 16 DEGREES
PLATELET # BLD AUTO: 112 THOUSANDS/UL (ref 149–390)
PMV BLD AUTO: 12.1 FL (ref 8.9–12.7)
POTASSIUM SERPL-SCNC: 4.1 MMOL/L (ref 3.5–5.3)
PR INTERVAL: 124 MS
PROCALCITONIN SERPL-MCNC: 0.33 NG/ML
QRS AXIS: 54 DEGREES
QRSD INTERVAL: 74 MS
QT INTERVAL: 342 MS
QTC INTERVAL: 425 MS
RBC # BLD AUTO: 2.78 MILLION/UL (ref 3.81–5.12)
SODIUM SERPL-SCNC: 147 MMOL/L (ref 136–145)
T WAVE AXIS: 55 DEGREES
VENTRICULAR RATE: 93 BPM
WBC # BLD AUTO: 5.84 THOUSAND/UL (ref 4.31–10.16)

## 2018-12-18 PROCEDURE — 93010 ELECTROCARDIOGRAM REPORT: CPT | Performed by: INTERNAL MEDICINE

## 2018-12-18 PROCEDURE — 83605 ASSAY OF LACTIC ACID: CPT | Performed by: PHYSICIAN ASSISTANT

## 2018-12-18 PROCEDURE — 85025 COMPLETE CBC W/AUTO DIFF WBC: CPT | Performed by: PHYSICIAN ASSISTANT

## 2018-12-18 PROCEDURE — 80048 BASIC METABOLIC PNL TOTAL CA: CPT | Performed by: PHYSICIAN ASSISTANT

## 2018-12-18 PROCEDURE — 99232 SBSQ HOSP IP/OBS MODERATE 35: CPT | Performed by: HOSPITALIST

## 2018-12-18 PROCEDURE — 82948 REAGENT STRIP/BLOOD GLUCOSE: CPT

## 2018-12-18 RX ORDER — BENZONATATE 100 MG/1
100 CAPSULE ORAL 3 TIMES DAILY
Status: DISCONTINUED | OUTPATIENT
Start: 2018-12-18 | End: 2018-12-20 | Stop reason: HOSPADM

## 2018-12-18 RX ADMIN — FERROUS SULFATE TAB 325 MG (65 MG ELEMENTAL FE) 325 MG: 325 (65 FE) TAB at 17:05

## 2018-12-18 RX ADMIN — TRAZODONE HYDROCHLORIDE 100 MG: 100 TABLET ORAL at 21:01

## 2018-12-18 RX ADMIN — CITALOPRAM HYDROBROMIDE 40 MG: 20 TABLET ORAL at 10:33

## 2018-12-18 RX ADMIN — DOCOSANOL: 100 CREAM TOPICAL at 13:01

## 2018-12-18 RX ADMIN — LEVOTHYROXINE SODIUM 25 MCG: 50 TABLET ORAL at 05:35

## 2018-12-18 RX ADMIN — OXYBUTYNIN CHLORIDE 5 MG: 5 TABLET ORAL at 21:01

## 2018-12-18 RX ADMIN — LORATADINE 10 MG: 10 TABLET ORAL at 10:34

## 2018-12-18 RX ADMIN — DOCUSATE SODIUM 100 MG: 100 CAPSULE, LIQUID FILLED ORAL at 10:33

## 2018-12-18 RX ADMIN — INSULIN LISPRO 1 UNITS: 100 INJECTION, SOLUTION INTRAVENOUS; SUBCUTANEOUS at 21:39

## 2018-12-18 RX ADMIN — AZITHROMYCIN MONOHYDRATE 500 MG: 500 INJECTION, POWDER, LYOPHILIZED, FOR SOLUTION INTRAVENOUS at 21:01

## 2018-12-18 RX ADMIN — DIVALPROEX SODIUM 1000 MG: 500 TABLET, DELAYED RELEASE ORAL at 10:34

## 2018-12-18 RX ADMIN — SODIUM CHLORIDE 1000 ML: 0.9 INJECTION, SOLUTION INTRAVENOUS at 03:58

## 2018-12-18 RX ADMIN — SODIUM CHLORIDE 125 ML/HR: 0.9 INJECTION, SOLUTION INTRAVENOUS at 19:00

## 2018-12-18 RX ADMIN — BENZONATATE 100 MG: 100 CAPSULE ORAL at 17:05

## 2018-12-18 RX ADMIN — BACLOFEN 10 MG: 10 TABLET ORAL at 21:01

## 2018-12-18 RX ADMIN — DOCOSANOL 1 APPLICATION: 100 CREAM TOPICAL at 17:06

## 2018-12-18 RX ADMIN — BENZONATATE 100 MG: 100 CAPSULE ORAL at 12:46

## 2018-12-18 RX ADMIN — ACETAMINOPHEN 650 MG: 325 TABLET, FILM COATED ORAL at 17:05

## 2018-12-18 RX ADMIN — VITAMIN D, TAB 1000IU (100/BT) 2000 UNITS: 25 TAB at 10:33

## 2018-12-18 RX ADMIN — DOCOSANOL: 100 CREAM TOPICAL at 10:44

## 2018-12-18 RX ADMIN — LORAZEPAM 0.5 MG: 0.5 TABLET ORAL at 17:05

## 2018-12-18 RX ADMIN — CEFTRIAXONE 1000 MG: 1 INJECTION, POWDER, FOR SOLUTION INTRAMUSCULAR; INTRAVENOUS at 18:56

## 2018-12-18 RX ADMIN — ARIPIPRAZOLE 30 MG: 10 TABLET ORAL at 21:01

## 2018-12-18 RX ADMIN — OXYBUTYNIN CHLORIDE 5 MG: 5 TABLET ORAL at 17:05

## 2018-12-18 RX ADMIN — PRAVASTATIN SODIUM 40 MG: 40 TABLET ORAL at 17:05

## 2018-12-18 RX ADMIN — DOCUSATE SODIUM 100 MG: 100 CAPSULE, LIQUID FILLED ORAL at 17:05

## 2018-12-18 RX ADMIN — DOCOSANOL 1 APPLICATION: 100 CREAM TOPICAL at 21:39

## 2018-12-18 RX ADMIN — BACLOFEN 10 MG: 10 TABLET ORAL at 10:34

## 2018-12-18 RX ADMIN — ENOXAPARIN SODIUM 40 MG: 40 INJECTION SUBCUTANEOUS at 10:33

## 2018-12-18 RX ADMIN — BACLOFEN 10 MG: 10 TABLET ORAL at 17:05

## 2018-12-18 RX ADMIN — OXYBUTYNIN CHLORIDE 5 MG: 5 TABLET ORAL at 10:33

## 2018-12-18 RX ADMIN — LORAZEPAM 0.5 MG: 0.5 TABLET ORAL at 11:56

## 2018-12-18 RX ADMIN — POLYETHYLENE GLYCOL 3350 17 G: 17 POWDER, FOR SOLUTION ORAL at 10:36

## 2018-12-18 RX ADMIN — BENZONATATE 100 MG: 100 CAPSULE ORAL at 21:01

## 2018-12-18 RX ADMIN — PANTOPRAZOLE SODIUM 40 MG: 40 TABLET, DELAYED RELEASE ORAL at 05:35

## 2018-12-18 RX ADMIN — FERROUS SULFATE TAB 325 MG (65 MG ELEMENTAL FE) 325 MG: 325 (65 FE) TAB at 10:38

## 2018-12-18 RX ADMIN — SODIUM CHLORIDE 125 ML/HR: 0.9 INJECTION, SOLUTION INTRAVENOUS at 10:44

## 2018-12-19 ENCOUNTER — APPOINTMENT (INPATIENT)
Dept: RADIOLOGY | Facility: HOSPITAL | Age: 58
DRG: 193 | End: 2018-12-19
Payer: COMMERCIAL

## 2018-12-19 LAB
ANION GAP SERPL CALCULATED.3IONS-SCNC: 8 MMOL/L (ref 4–13)
BASOPHILS # BLD AUTO: 0.03 THOUSANDS/ΜL (ref 0–0.1)
BASOPHILS NFR BLD AUTO: 1 % (ref 0–1)
BUN SERPL-MCNC: 13 MG/DL (ref 5–25)
CALCIUM SERPL-MCNC: 8 MG/DL (ref 8.3–10.1)
CHLORIDE SERPL-SCNC: 117 MMOL/L (ref 100–108)
CO2 SERPL-SCNC: 28 MMOL/L (ref 21–32)
CREAT SERPL-MCNC: 0.8 MG/DL (ref 0.6–1.3)
EOSINOPHIL # BLD AUTO: 0.17 THOUSAND/ΜL (ref 0–0.61)
EOSINOPHIL NFR BLD AUTO: 3 % (ref 0–6)
ERYTHROCYTE [DISTWIDTH] IN BLOOD BY AUTOMATED COUNT: 13.8 % (ref 11.6–15.1)
GFR SERPL CREATININE-BSD FRML MDRD: 83 ML/MIN/1.73SQ M
GLUCOSE SERPL-MCNC: 212 MG/DL (ref 65–140)
GLUCOSE SERPL-MCNC: 216 MG/DL (ref 65–140)
GLUCOSE SERPL-MCNC: 82 MG/DL (ref 65–140)
GLUCOSE SERPL-MCNC: 83 MG/DL (ref 65–140)
GLUCOSE SERPL-MCNC: 95 MG/DL (ref 65–140)
HCT VFR BLD AUTO: 33 % (ref 34.8–46.1)
HGB BLD-MCNC: 10.4 G/DL (ref 11.5–15.4)
IMM GRANULOCYTES # BLD AUTO: 0.02 THOUSAND/UL (ref 0–0.2)
IMM GRANULOCYTES NFR BLD AUTO: 0 % (ref 0–2)
LYMPHOCYTES # BLD AUTO: 1.95 THOUSANDS/ΜL (ref 0.6–4.47)
LYMPHOCYTES NFR BLD AUTO: 32 % (ref 14–44)
MCH RBC QN AUTO: 33.8 PG (ref 26.8–34.3)
MCHC RBC AUTO-ENTMCNC: 31.5 G/DL (ref 31.4–37.4)
MCV RBC AUTO: 107 FL (ref 82–98)
MONOCYTES # BLD AUTO: 0.61 THOUSAND/ΜL (ref 0.17–1.22)
MONOCYTES NFR BLD AUTO: 10 % (ref 4–12)
NEUTROPHILS # BLD AUTO: 3.32 THOUSANDS/ΜL (ref 1.85–7.62)
NEUTS SEG NFR BLD AUTO: 54 % (ref 43–75)
NRBC BLD AUTO-RTO: 0 /100 WBCS
PLATELET # BLD AUTO: 124 THOUSANDS/UL (ref 149–390)
PMV BLD AUTO: 12.3 FL (ref 8.9–12.7)
POTASSIUM SERPL-SCNC: 4.3 MMOL/L (ref 3.5–5.3)
RBC # BLD AUTO: 3.08 MILLION/UL (ref 3.81–5.12)
SODIUM SERPL-SCNC: 153 MMOL/L (ref 136–145)
WBC # BLD AUTO: 6.1 THOUSAND/UL (ref 4.31–10.16)

## 2018-12-19 PROCEDURE — 99232 SBSQ HOSP IP/OBS MODERATE 35: CPT | Performed by: HOSPITALIST

## 2018-12-19 PROCEDURE — 85025 COMPLETE CBC W/AUTO DIFF WBC: CPT | Performed by: HOSPITALIST

## 2018-12-19 PROCEDURE — 71046 X-RAY EXAM CHEST 2 VIEWS: CPT

## 2018-12-19 PROCEDURE — 80048 BASIC METABOLIC PNL TOTAL CA: CPT | Performed by: HOSPITALIST

## 2018-12-19 PROCEDURE — 82948 REAGENT STRIP/BLOOD GLUCOSE: CPT

## 2018-12-19 RX ORDER — DEXTROSE MONOHYDRATE 50 MG/ML
75 INJECTION, SOLUTION INTRAVENOUS CONTINUOUS
Status: DISCONTINUED | OUTPATIENT
Start: 2018-12-19 | End: 2018-12-20 | Stop reason: HOSPADM

## 2018-12-19 RX ADMIN — DOCOSANOL: 100 CREAM TOPICAL at 21:24

## 2018-12-19 RX ADMIN — OXYBUTYNIN CHLORIDE 5 MG: 5 TABLET ORAL at 21:16

## 2018-12-19 RX ADMIN — CEFTRIAXONE 1000 MG: 1 INJECTION, POWDER, FOR SOLUTION INTRAMUSCULAR; INTRAVENOUS at 19:26

## 2018-12-19 RX ADMIN — BENZONATATE 100 MG: 100 CAPSULE ORAL at 17:52

## 2018-12-19 RX ADMIN — DIVALPROEX SODIUM 1000 MG: 500 TABLET, DELAYED RELEASE ORAL at 10:34

## 2018-12-19 RX ADMIN — DOCOSANOL: 100 CREAM TOPICAL at 13:09

## 2018-12-19 RX ADMIN — DOCOSANOL: 100 CREAM TOPICAL at 17:52

## 2018-12-19 RX ADMIN — LORAZEPAM 0.5 MG: 0.5 TABLET ORAL at 17:40

## 2018-12-19 RX ADMIN — OXYBUTYNIN CHLORIDE 5 MG: 5 TABLET ORAL at 17:39

## 2018-12-19 RX ADMIN — CITALOPRAM HYDROBROMIDE 40 MG: 20 TABLET ORAL at 10:34

## 2018-12-19 RX ADMIN — DOCUSATE SODIUM 100 MG: 100 CAPSULE, LIQUID FILLED ORAL at 17:39

## 2018-12-19 RX ADMIN — LORATADINE 10 MG: 10 TABLET ORAL at 10:37

## 2018-12-19 RX ADMIN — ARIPIPRAZOLE 30 MG: 10 TABLET ORAL at 21:20

## 2018-12-19 RX ADMIN — LEVOTHYROXINE SODIUM 25 MCG: 50 TABLET ORAL at 05:28

## 2018-12-19 RX ADMIN — INSULIN LISPRO 1 UNITS: 100 INJECTION, SOLUTION INTRAVENOUS; SUBCUTANEOUS at 17:49

## 2018-12-19 RX ADMIN — FERROUS SULFATE TAB 325 MG (65 MG ELEMENTAL FE) 325 MG: 325 (65 FE) TAB at 10:25

## 2018-12-19 RX ADMIN — BENZONATATE 100 MG: 100 CAPSULE ORAL at 21:16

## 2018-12-19 RX ADMIN — ENOXAPARIN SODIUM 40 MG: 40 INJECTION SUBCUTANEOUS at 10:38

## 2018-12-19 RX ADMIN — LORAZEPAM 0.5 MG: 0.5 TABLET ORAL at 10:34

## 2018-12-19 RX ADMIN — OXYBUTYNIN CHLORIDE 5 MG: 5 TABLET ORAL at 10:36

## 2018-12-19 RX ADMIN — BACLOFEN 10 MG: 10 TABLET ORAL at 21:16

## 2018-12-19 RX ADMIN — DOCOSANOL: 100 CREAM TOPICAL at 10:40

## 2018-12-19 RX ADMIN — FERROUS SULFATE TAB 325 MG (65 MG ELEMENTAL FE) 325 MG: 325 (65 FE) TAB at 17:39

## 2018-12-19 RX ADMIN — BENZONATATE 100 MG: 100 CAPSULE ORAL at 10:35

## 2018-12-19 RX ADMIN — DOCUSATE SODIUM 100 MG: 100 CAPSULE, LIQUID FILLED ORAL at 10:35

## 2018-12-19 RX ADMIN — VITAMIN D, TAB 1000IU (100/BT) 2000 UNITS: 25 TAB at 10:37

## 2018-12-19 RX ADMIN — AZITHROMYCIN MONOHYDRATE 500 MG: 500 INJECTION, POWDER, LYOPHILIZED, FOR SOLUTION INTRAVENOUS at 21:16

## 2018-12-19 RX ADMIN — DEXTROSE 75 ML/HR: 5 SOLUTION INTRAVENOUS at 13:06

## 2018-12-19 RX ADMIN — SODIUM CHLORIDE 125 ML/HR: 0.9 INJECTION, SOLUTION INTRAVENOUS at 10:58

## 2018-12-19 RX ADMIN — TRAZODONE HYDROCHLORIDE 100 MG: 100 TABLET ORAL at 21:16

## 2018-12-19 RX ADMIN — POLYETHYLENE GLYCOL 3350 17 G: 17 POWDER, FOR SOLUTION ORAL at 10:39

## 2018-12-19 RX ADMIN — INSULIN LISPRO 1 UNITS: 100 INJECTION, SOLUTION INTRAVENOUS; SUBCUTANEOUS at 21:18

## 2018-12-19 RX ADMIN — BACLOFEN 10 MG: 10 TABLET ORAL at 10:37

## 2018-12-19 RX ADMIN — PANTOPRAZOLE SODIUM 40 MG: 40 TABLET, DELAYED RELEASE ORAL at 05:28

## 2018-12-19 RX ADMIN — PRAVASTATIN SODIUM 40 MG: 40 TABLET ORAL at 17:39

## 2018-12-19 RX ADMIN — BACLOFEN 10 MG: 10 TABLET ORAL at 17:52

## 2018-12-20 VITALS
SYSTOLIC BLOOD PRESSURE: 104 MMHG | HEART RATE: 55 BPM | HEIGHT: 61 IN | DIASTOLIC BLOOD PRESSURE: 71 MMHG | BODY MASS INDEX: 18.77 KG/M2 | WEIGHT: 99.43 LBS | TEMPERATURE: 97.8 F | OXYGEN SATURATION: 97 % | RESPIRATION RATE: 16 BRPM

## 2018-12-20 LAB
ANION GAP SERPL CALCULATED.3IONS-SCNC: 7 MMOL/L (ref 4–13)
BACTERIA UR CULT: ABNORMAL
BACTERIA UR CULT: ABNORMAL
BASOPHILS # BLD AUTO: 0.05 THOUSANDS/ΜL (ref 0–0.1)
BASOPHILS NFR BLD AUTO: 1 % (ref 0–1)
BUN SERPL-MCNC: 13 MG/DL (ref 5–25)
CALCIUM SERPL-MCNC: 8 MG/DL (ref 8.3–10.1)
CHLORIDE SERPL-SCNC: 113 MMOL/L (ref 100–108)
CO2 SERPL-SCNC: 29 MMOL/L (ref 21–32)
CREAT SERPL-MCNC: 0.8 MG/DL (ref 0.6–1.3)
EOSINOPHIL # BLD AUTO: 0.44 THOUSAND/ΜL (ref 0–0.61)
EOSINOPHIL NFR BLD AUTO: 6 % (ref 0–6)
ERYTHROCYTE [DISTWIDTH] IN BLOOD BY AUTOMATED COUNT: 13.6 % (ref 11.6–15.1)
GFR SERPL CREATININE-BSD FRML MDRD: 83 ML/MIN/1.73SQ M
GLUCOSE SERPL-MCNC: 166 MG/DL (ref 65–140)
GLUCOSE SERPL-MCNC: 189 MG/DL (ref 65–140)
GLUCOSE SERPL-MCNC: 84 MG/DL (ref 65–140)
GLUCOSE SERPL-MCNC: 96 MG/DL (ref 65–140)
HCT VFR BLD AUTO: 34.1 % (ref 34.8–46.1)
HGB BLD-MCNC: 11 G/DL (ref 11.5–15.4)
IMM GRANULOCYTES # BLD AUTO: 0.03 THOUSAND/UL (ref 0–0.2)
IMM GRANULOCYTES NFR BLD AUTO: 0 % (ref 0–2)
LYMPHOCYTES # BLD AUTO: 2.12 THOUSANDS/ΜL (ref 0.6–4.47)
LYMPHOCYTES NFR BLD AUTO: 31 % (ref 14–44)
MCH RBC QN AUTO: 34.1 PG (ref 26.8–34.3)
MCHC RBC AUTO-ENTMCNC: 32.3 G/DL (ref 31.4–37.4)
MCV RBC AUTO: 106 FL (ref 82–98)
MONOCYTES # BLD AUTO: 0.76 THOUSAND/ΜL (ref 0.17–1.22)
MONOCYTES NFR BLD AUTO: 11 % (ref 4–12)
NEUTROPHILS # BLD AUTO: 3.44 THOUSANDS/ΜL (ref 1.85–7.62)
NEUTS SEG NFR BLD AUTO: 51 % (ref 43–75)
NRBC BLD AUTO-RTO: 0 /100 WBCS
PLATELET # BLD AUTO: 154 THOUSANDS/UL (ref 149–390)
PMV BLD AUTO: 12 FL (ref 8.9–12.7)
POTASSIUM SERPL-SCNC: 4.2 MMOL/L (ref 3.5–5.3)
RBC # BLD AUTO: 3.23 MILLION/UL (ref 3.81–5.12)
SODIUM SERPL-SCNC: 149 MMOL/L (ref 136–145)
WBC # BLD AUTO: 6.84 THOUSAND/UL (ref 4.31–10.16)

## 2018-12-20 PROCEDURE — 82948 REAGENT STRIP/BLOOD GLUCOSE: CPT

## 2018-12-20 PROCEDURE — 99239 HOSP IP/OBS DSCHRG MGMT >30: CPT | Performed by: HOSPITALIST

## 2018-12-20 PROCEDURE — 85025 COMPLETE CBC W/AUTO DIFF WBC: CPT | Performed by: HOSPITALIST

## 2018-12-20 PROCEDURE — 80048 BASIC METABOLIC PNL TOTAL CA: CPT | Performed by: HOSPITALIST

## 2018-12-20 RX ORDER — CEPHALEXIN 500 MG/1
500 CAPSULE ORAL EVERY 12 HOURS SCHEDULED
Qty: 8 CAPSULE | Refills: 0 | Status: SHIPPED | OUTPATIENT
Start: 2018-12-20 | End: 2018-12-24

## 2018-12-20 RX ORDER — CEPHALEXIN 500 MG/1
500 CAPSULE ORAL EVERY 12 HOURS SCHEDULED
Qty: 8 CAPSULE | Refills: 0 | Status: SHIPPED | OUTPATIENT
Start: 2018-12-20 | End: 2018-12-20

## 2018-12-20 RX ORDER — BENZONATATE 100 MG/1
100 CAPSULE ORAL 3 TIMES DAILY
Qty: 20 CAPSULE | Refills: 0 | Status: SHIPPED | OUTPATIENT
Start: 2018-12-20 | End: 2018-12-20

## 2018-12-20 RX ORDER — BENZONATATE 100 MG/1
100 CAPSULE ORAL 3 TIMES DAILY
Qty: 20 CAPSULE | Refills: 0 | Status: SHIPPED | OUTPATIENT
Start: 2018-12-20 | End: 2018-12-21

## 2018-12-20 RX ADMIN — PANTOPRAZOLE SODIUM 40 MG: 40 TABLET, DELAYED RELEASE ORAL at 05:39

## 2018-12-20 RX ADMIN — BENZONATATE 100 MG: 100 CAPSULE ORAL at 09:38

## 2018-12-20 RX ADMIN — LEVOTHYROXINE SODIUM 25 MCG: 50 TABLET ORAL at 05:39

## 2018-12-20 RX ADMIN — OXYBUTYNIN CHLORIDE 5 MG: 5 TABLET ORAL at 09:39

## 2018-12-20 RX ADMIN — BACLOFEN 10 MG: 10 TABLET ORAL at 16:16

## 2018-12-20 RX ADMIN — INSULIN LISPRO 1 UNITS: 100 INJECTION, SOLUTION INTRAVENOUS; SUBCUTANEOUS at 13:27

## 2018-12-20 RX ADMIN — DOCOSANOL: 100 CREAM TOPICAL at 07:54

## 2018-12-20 RX ADMIN — DOCOSANOL: 100 CREAM TOPICAL at 14:11

## 2018-12-20 RX ADMIN — ENOXAPARIN SODIUM 40 MG: 40 INJECTION SUBCUTANEOUS at 09:38

## 2018-12-20 RX ADMIN — OXYBUTYNIN CHLORIDE 5 MG: 5 TABLET ORAL at 16:16

## 2018-12-20 RX ADMIN — INSULIN LISPRO 1 UNITS: 100 INJECTION, SOLUTION INTRAVENOUS; SUBCUTANEOUS at 16:17

## 2018-12-20 RX ADMIN — LORAZEPAM 0.5 MG: 0.5 TABLET ORAL at 09:38

## 2018-12-20 RX ADMIN — BENZONATATE 100 MG: 100 CAPSULE ORAL at 16:16

## 2018-12-20 RX ADMIN — PRAVASTATIN SODIUM 40 MG: 40 TABLET ORAL at 16:16

## 2018-12-20 RX ADMIN — FERROUS SULFATE TAB 325 MG (65 MG ELEMENTAL FE) 325 MG: 325 (65 FE) TAB at 16:16

## 2018-12-20 RX ADMIN — CITALOPRAM HYDROBROMIDE 40 MG: 20 TABLET ORAL at 09:39

## 2018-12-20 RX ADMIN — LORATADINE 10 MG: 10 TABLET ORAL at 09:38

## 2018-12-20 RX ADMIN — DOCOSANOL: 100 CREAM TOPICAL at 12:45

## 2018-12-20 RX ADMIN — BACLOFEN 10 MG: 10 TABLET ORAL at 09:38

## 2018-12-20 RX ADMIN — DIVALPROEX SODIUM 1000 MG: 500 TABLET, DELAYED RELEASE ORAL at 09:38

## 2018-12-20 RX ADMIN — FERROUS SULFATE TAB 325 MG (65 MG ELEMENTAL FE) 325 MG: 325 (65 FE) TAB at 09:39

## 2018-12-20 RX ADMIN — VITAMIN D, TAB 1000IU (100/BT) 2000 UNITS: 25 TAB at 09:38

## 2018-12-21 ENCOUNTER — TELEPHONE (OUTPATIENT)
Dept: FAMILY MEDICINE CLINIC | Facility: CLINIC | Age: 58
End: 2018-12-21

## 2018-12-21 DIAGNOSIS — R05.9 COUGH: Primary | ICD-10-CM

## 2018-12-21 DIAGNOSIS — R05.9 COUGH: ICD-10-CM

## 2018-12-21 RX ORDER — BENZONATATE 100 MG/1
100 CAPSULE ORAL EVERY 8 HOURS PRN
Qty: 30 CAPSULE | Refills: 0 | OUTPATIENT
Start: 2018-12-21

## 2018-12-21 RX ORDER — BENZONATATE 100 MG/1
100 CAPSULE ORAL 3 TIMES DAILY PRN
Qty: 30 CAPSULE | Refills: 0 | Status: SHIPPED | OUTPATIENT
Start: 2018-12-21 | End: 2018-12-22 | Stop reason: SDUPTHER

## 2018-12-21 RX ORDER — BENZONATATE 100 MG/1
100 CAPSULE ORAL 3 TIMES DAILY PRN
Qty: 30 CAPSULE | Refills: 0 | Status: SHIPPED | OUTPATIENT
Start: 2018-12-21 | End: 2018-12-21 | Stop reason: SDUPTHER

## 2018-12-21 NOTE — TELEPHONE ENCOUNTER
Lauri Wiley came in regarding pt being in the hosp , she went in 12/17 and was discharged 12/20, pt was given a script for Tessalon Perles 100mg caps, 3 times day,  Lauri Wiley needs order changed to every 8 hrs and PRN ,  When done, fax it to Reina at [phone]  Thanks You    Also needs Congestion added as DX to pt allegra and have that faxed as well

## 2018-12-22 DIAGNOSIS — R05.9 COUGH: ICD-10-CM

## 2018-12-22 LAB
BACTERIA BLD CULT: NORMAL
BACTERIA BLD CULT: NORMAL

## 2018-12-22 RX ORDER — BENZONATATE 100 MG/1
100 CAPSULE ORAL EVERY 8 HOURS PRN
Qty: 30 CAPSULE | Refills: 0 | Status: SHIPPED | OUTPATIENT
Start: 2018-12-22

## 2018-12-22 NOTE — PROGRESS NOTES
Received a call from [phone] regarding Mi Vargas  Request to change order for tessalon perles  Changed tessalon perles 100mg capsule from TID prn to Every 8 hours PRN per request of nursing home staff  I have faxed a copy of the order of Tessalon Perles 100mg every 8 hours prn for cough

## 2018-12-26 ENCOUNTER — TRANSITIONAL CARE MANAGEMENT (OUTPATIENT)
Dept: FAMILY MEDICINE CLINIC | Facility: CLINIC | Age: 58
End: 2018-12-26

## 2018-12-28 ENCOUNTER — OFFICE VISIT (OUTPATIENT)
Dept: FAMILY MEDICINE CLINIC | Facility: CLINIC | Age: 58
End: 2018-12-28
Payer: COMMERCIAL

## 2018-12-28 VITALS
BODY MASS INDEX: 19.07 KG/M2 | DIASTOLIC BLOOD PRESSURE: 70 MMHG | HEART RATE: 68 BPM | WEIGHT: 101 LBS | HEIGHT: 61 IN | RESPIRATION RATE: 16 BRPM | SYSTOLIC BLOOD PRESSURE: 110 MMHG | TEMPERATURE: 95.1 F

## 2018-12-28 DIAGNOSIS — J18.9 PNEUMONIA DUE TO INFECTIOUS ORGANISM, UNSPECIFIED LATERALITY, UNSPECIFIED PART OF LUNG: Primary | ICD-10-CM

## 2018-12-28 DIAGNOSIS — A41.9 SEPSIS, DUE TO UNSPECIFIED ORGANISM: ICD-10-CM

## 2018-12-28 DIAGNOSIS — E87.0 HYPERNATREMIA: ICD-10-CM

## 2018-12-28 PROCEDURE — 99495 TRANSJ CARE MGMT MOD F2F 14D: CPT | Performed by: FAMILY MEDICINE

## 2018-12-28 PROCEDURE — 1111F DSCHRG MED/CURRENT MED MERGE: CPT | Performed by: FAMILY MEDICINE

## 2018-12-28 NOTE — ASSESSMENT & PLAN NOTE
Resolved  Confirmed source of RLL PNA  CXR: (12/19/18)  There is new right lower lobe airspace consolidation, with probable superimposed small right pleural effusion, suspicious for pneumonia   CXR: (12/17/18)  Low lung volumes with minor bibasilar subsegmental atelectasis  Patient completed 7day course of Abx  D/eric on 12/22  Asymptomatic (at baseline state of health) since d/c  VS wnl today  Physical exam benign  Will repeat CXR in 6 weeks for resolution  Caregiver counseled, return precautions discussed    RTC PRN

## 2018-12-28 NOTE — PROGRESS NOTES
Transition of Care Management Visit  Erika French 66 y o  female   MRN: [id number]    Assessment/Plan: Erika French is a 66 y o  female with:   Sepsis ([address] )  Resolved  Confirmed source of RLL PNA  CXR: (12/19/18)  There is new right lower lobe airspace consolidation, with probable superimposed small right pleural effusion, suspicious for pneumonia   CXR: (12/17/18)  Low lung volumes with minor bibasilar subsegmental atelectasis  Patient completed 7day course of Abx  D/eric on 12/22  Asymptomatic (at baseline state of health) since d/c  VS wnl today  Physical exam benign  Will repeat CXR in 6 weeks for resolution  Caregiver counseled, return precautions discussed  RTC PRN    Hypernatremia  Na 149 on discharge 12/22/2018, Patient asymptomatic today (at baseline state of health)  Likely due to aggressive iv hydration (NS) for sepsis  Repeat Bmp ordered   Return precautions discussed with caregiver  Will contact patient with results  Subjective:  Erika French is a 66 y o  female here for Transition Care Management Visit  Hospital course summary:  Erika French is a 66 y o  female with Cerebral palsy, severe bipolar d/o, recurrent UTIs who originally presented to the hospital on 12/17/2018 due to productive cough and fever  CXR showed RLL infiltrate was apparent at that time  Patient was admitted for Sepsis secondary to PNA  Pt was started on CAP treatment with IV Abx, Ceftriaxone and Azithromycin  Was continued on cephalexin on discharge  Urine culture was obtained on admission and did grow Klebsiella  Pt's group home nurse notes that her Ucx usually grow Klebsiella which was also treated with Abx, however, suspect urine bug was more likely contaminant given symptom on admission most c/w URI/CAP  Today patient is here for TCM visit  Hospital course as above   Per caregiver, patient completed 7 day course of Abx (including 4 days of Keflex after discharge) and has been at baseline state of health,  other than some s/s of a viral URI that was running in the group home where patient lives  Patient's urine culture was positive for Klebsiella during hospital stay, and has chronic Klebsiella colonization on macrobid daily  However the strain of Klebsiella was different compared to the chronic cololization and was thought to be a contaminant  Macrobid (held during hospital stay) has been restarted  Review of Systems   Reason unable to perform ROS: Patient poor historian, and only able to answer basic questions  Denies pain  Otherwise unable to communicate appropriately regardign symptoms  Objective:  /70   Pulse 68   Temp (!) 95 1 °F (35 1 °C)   Resp 16   Ht 5' 1" (1 549 m)   Wt 45 8 kg (101 lb)   LMP 11/29/2009 (Exact Date)   BMI 19 08 kg/m²   Physical Exam   Constitutional: She appears well-developed and well-nourished  No distress  Wheelchair bound due to CP, unable to communicate appropriately, however cooperates with exam   HENT:   Head: Normocephalic and atraumatic  Right Ear: External ear normal    Left Ear: External ear normal    Mouth/Throat: Oropharynx is clear and moist  No oropharyngeal exudate  Eyes: Pupils are equal, round, and reactive to light  Conjunctivae and EOM are normal  No scleral icterus  Neck: No thyromegaly present  Cardiovascular: Normal rate, regular rhythm and normal heart sounds  No murmur heard  Pulmonary/Chest: Effort normal and breath sounds normal  No respiratory distress  She has no wheezes  She has no rales  Abdominal: Soft  Normal appearance and bowel sounds are normal  There is no tenderness  There is no guarding  Musculoskeletal: She exhibits no edema or tenderness  Lymphadenopathy:     She has no cervical adenopathy  Neurological: She is alert  Skin: Skin is warm and dry  She is not diaphoretic  Vitals reviewed      Transitional Care Management Review:  During the TCM phone call patient stated:  TCM Call (since 11/27/2018)     Date and time call was made  12/26/2018  1:51 PM    Hospital care reviewed  Records reviewed    Patient was hospitialized at  [address] [place] [address]    Date of Admission  12/17/18    Date of discharge  12/20/18    Diagnosis  Sepsis, Cerebral Palsy    Disposition  Home    Were the patients medications reviewed and updated  Yes    Current Symptoms  None      TCM Call (since 11/27/2018)     Scheduled for follow up? Yes    Did you obtain your prescribed medications  No    Do you need help managing your prescriptions or medications  No    Is transportation to your appointment needed  No    I have advised the patient to call PCP with any new or worsening symptoms  Paradise Parikh LPN    Are you recieving any outpatient services  No    Are you recieving home care services  No    Are you using any community resources  No    Current waiver services  No    Interperter language line needed  No    Counseling  Caregiver    Counseling topics  instructions for management; Activities of daily living        Need for Follow-up: Follow up BMP/CXR ordered as above  Changed/New Medications: No medication changes  I personally reviewed the following images: CXR's from 12/17 and 12/19, showing findings consistent with bibasilar atelectasis and RLL consolidation, respectively  Note: Portions of the record may have been created with voice recognition software  Occasional wrong word or "sound a like" substitutions may have occurred due to the inherent limitations of voice recognition software  Read the chart carefully and recognize, using context, where substitutions have occurred

## 2018-12-28 NOTE — ASSESSMENT & PLAN NOTE
Na 149 on discharge 12/22/2018, Patient asymptomatic today (at baseline state of health)  Likely due to aggressive iv hydration (NS) for sepsis  Repeat Bmp ordered   Return precautions discussed with caregiver  Will contact patient with results

## 2019-01-23 ENCOUNTER — OFFICE VISIT (OUTPATIENT)
Dept: NEUROLOGY | Facility: CLINIC | Age: 59
End: 2019-01-23
Payer: COMMERCIAL

## 2019-01-23 ENCOUNTER — APPOINTMENT (OUTPATIENT)
Dept: LAB | Age: 59
End: 2019-01-23
Payer: COMMERCIAL

## 2019-01-23 ENCOUNTER — OFFICE VISIT (OUTPATIENT)
Dept: FAMILY MEDICINE CLINIC | Facility: CLINIC | Age: 59
End: 2019-01-23

## 2019-01-23 ENCOUNTER — APPOINTMENT (OUTPATIENT)
Dept: RADIOLOGY | Age: 59
End: 2019-01-23
Payer: COMMERCIAL

## 2019-01-23 VITALS
WEIGHT: 99 LBS | DIASTOLIC BLOOD PRESSURE: 74 MMHG | BODY MASS INDEX: 18.69 KG/M2 | SYSTOLIC BLOOD PRESSURE: 110 MMHG | HEIGHT: 61 IN

## 2019-01-23 VITALS
BODY MASS INDEX: 19.37 KG/M2 | WEIGHT: 102.6 LBS | RESPIRATION RATE: 12 BRPM | SYSTOLIC BLOOD PRESSURE: 108 MMHG | DIASTOLIC BLOOD PRESSURE: 72 MMHG | HEIGHT: 61 IN | TEMPERATURE: 96 F | HEART RATE: 66 BPM

## 2019-01-23 DIAGNOSIS — I69.398 SPASTICITY AS LATE EFFECT OF CEREBROVASCULAR ACCIDENT (CVA): ICD-10-CM

## 2019-01-23 DIAGNOSIS — G24.3 CERVICAL DYSTONIA: ICD-10-CM

## 2019-01-23 DIAGNOSIS — G80.0 SPASTIC QUADRIPARESIS SECONDARY TO CEREBRAL PALSY (HCC): Primary | ICD-10-CM

## 2019-01-23 DIAGNOSIS — R25.2 SPASTICITY AS LATE EFFECT OF CEREBROVASCULAR ACCIDENT (CVA): ICD-10-CM

## 2019-01-23 DIAGNOSIS — R25.2 SPASTICITY: ICD-10-CM

## 2019-01-23 DIAGNOSIS — A41.9 SEPSIS, DUE TO UNSPECIFIED ORGANISM: ICD-10-CM

## 2019-01-23 DIAGNOSIS — E87.0 HYPERNATREMIA: ICD-10-CM

## 2019-01-23 DIAGNOSIS — R26.2 AMBULATORY DYSFUNCTION: ICD-10-CM

## 2019-01-23 DIAGNOSIS — E11.9 TYPE 2 DIABETES MELLITUS WITHOUT COMPLICATION, WITHOUT LONG-TERM CURRENT USE OF INSULIN (HCC): Primary | ICD-10-CM

## 2019-01-23 DIAGNOSIS — F31.9 BIPOLAR AFFECTIVE DISORDER, REMISSION STATUS UNSPECIFIED (HCC): ICD-10-CM

## 2019-01-23 DIAGNOSIS — E03.9 HYPOTHYROIDISM, UNSPECIFIED TYPE: ICD-10-CM

## 2019-01-23 DIAGNOSIS — J18.9 PNEUMONIA DUE TO INFECTIOUS ORGANISM, UNSPECIFIED LATERALITY, UNSPECIFIED PART OF LUNG: ICD-10-CM

## 2019-01-23 DIAGNOSIS — I10 ESSENTIAL HYPERTENSION: ICD-10-CM

## 2019-01-23 PROBLEM — Z00.00 HEALTHCARE MAINTENANCE: Status: RESOLVED | Noted: 2018-09-06 | Resolved: 2019-01-23

## 2019-01-23 LAB
ANION GAP SERPL CALCULATED.3IONS-SCNC: 7 MMOL/L (ref 4–13)
BUN SERPL-MCNC: 21 MG/DL (ref 5–25)
CALCIUM SERPL-MCNC: 9.5 MG/DL (ref 8.3–10.1)
CHLORIDE SERPL-SCNC: 101 MMOL/L (ref 100–108)
CO2 SERPL-SCNC: 30 MMOL/L (ref 21–32)
CREAT SERPL-MCNC: 0.83 MG/DL (ref 0.6–1.3)
GFR SERPL CREATININE-BSD FRML MDRD: 79 ML/MIN/1.73SQ M
GLUCOSE SERPL-MCNC: 75 MG/DL (ref 65–140)
POTASSIUM SERPL-SCNC: 4.6 MMOL/L (ref 3.5–5.3)
SODIUM SERPL-SCNC: 138 MMOL/L (ref 136–145)

## 2019-01-23 PROCEDURE — 80048 BASIC METABOLIC PNL TOTAL CA: CPT

## 2019-01-23 PROCEDURE — 99213 OFFICE O/P EST LOW 20 MIN: CPT | Performed by: FAMILY MEDICINE

## 2019-01-23 PROCEDURE — 99214 OFFICE O/P EST MOD 30 MIN: CPT | Performed by: PHYSICAL MEDICINE & REHABILITATION

## 2019-01-23 PROCEDURE — 36415 COLL VENOUS BLD VENIPUNCTURE: CPT

## 2019-01-23 PROCEDURE — 71046 X-RAY EXAM CHEST 2 VIEWS: CPT

## 2019-01-23 RX ORDER — BACLOFEN 10 MG/1
10 TABLET ORAL 3 TIMES DAILY
Qty: 90 TABLET | Refills: 6 | Status: SHIPPED | OUTPATIENT
Start: 2019-01-23 | End: 2019-05-30 | Stop reason: SDUPTHER

## 2019-01-23 NOTE — ASSESSMENT & PLAN NOTE
Missed diagnosis, blood pressure is actually on the lower end of normal and has had previous hypotensive episodes    Lisinopril ordered in the past for renal protection given her history of diabetes  Blood pressure today 108/72, well controlled and within the desirable range  Will continue to monitor off antihypertensive

## 2019-01-23 NOTE — PATIENT INSTRUCTIONS
Nutrition: How to Make Elbert Clayton6  A healthy diet has a lot of benefits  It can prevent certain health conditions like heart disease and cancer, and it can lower your cholesterol  It can give you more energy, help you focus, and improve your mood  It can also help you lose weight or stay at a healthy weight  Path to Improved Health  The choices you make about what you eat and drink matter  They should add up to a balanced, nutritious diet  We all have different calorie needs based on our age, sex, and activity level  Health conditions can have a role, too  Fruits and Vegetables  Fruits and vegetables are rich in fiber, vitamins, and minerals  They should be the basis of your diet  Try to get many different colors of fruits and vegetables each day to add flavor and variety  Fruits and vegetables should cover half of your plate at each meal  Try not to add saturated fats and sugar to vegetables and fruits  This means avoiding margarine, butter, mayonnaise, and sour cream  You can use yogurt, healthy oils (such as canola or olive oil), or herbs instead  Potatoes and corn are not considered vegetables  Your body processes them more like grains  FRUITS & VEGETABLES   INSTEAD OF THIS: TRY THIS:   Regular or fried vegetables served with cream, cheese, or butter Raw, steamed, boiled, sautéed, or baked vegetables tossed with olive oil, salt, and pepper, or with onions or spices added (like garlic and cumin)   Fruits served with cream cheese or sugary sauces Fresh fruit with peanut, almond, or cashew butter or plain yogurt   Fried potatoes, including french fries, hash browns, and potato chips Baked sweet potatoes or other vegetables     Grains  Choose products that list whole grains as the first ingredient  Whole grains are high in fiber, protein, and vitamins  They are digested slowly, which helps you feel full longer and keeps you from overeating  Avoid products that say enriched    Hot cereals like oatmeal are usually low in saturated fat  However, instant cereals with cream may contain processed oils and can be high in sugar  Granola cereals usually contain a lot of sugar  Cold cereals are generally made with refined grains and are high in sugars  Look for whole-grain, low-sugar options instead  Try not to eat rich sweets, such as doughnuts, rolls, and muffins  Consider fruit or a piece of dark chocolate instead to satisfy your sweet tooth  GRAINS   INSTEAD OF THIS: TRY THIS:   Croissants, rolls, biscuits, and white breads Whole-grain breads, including wheat, rye, and pumpernickel   Doughnuts, pastries, and scones Whole-grain English muffins and small whole-grain bagels   Fried tortillas Soft tortillas (corn or whole wheat) without trans fats   Sugary cereals and regular granola Whole-grain cereal, oatmeal, and reduced-sugar granola   Snack crackers Whole-grain crackers   Potato or corn chips and buttered popcorn Unbuttered popcorn   White pasta Whole-wheat pasta   White rice Brown or wild rice   Fried rice or pasta mixes Brown rice or whole-grain pasta with low-sodium vegetable sauce   All-purpose white flour Whole-wheat flour     Protein  Protein can come from animal and vegetable sources  People who get more of their protein from animal sources tend to have more health problems that can lead to illness and early death  It is healthier to eat meat less often and get most of your protein from plant sources  When you eat meat, choose leaner cuts  Vegetable Protein Sources  There are many ways to get protein in your diet even if you do not eat meat  Most vegetables have some protein  When you eat these vegetables with whole grains, seeds, nuts, and especially beans, you can get a good amount of protein  You can swap beans for meat in recipes like lasagna or chili  Soy foods such as tofu, tempeh, and edamame are also good sources of protein      Beef, Pork, Veal, and [employer] and veal cuts have the words loin or round in their names  Lean pork cuts have the words loin or leg in their names  Trim off the outside fat before cooking the meat  Trim any inside fat before eating it  Use herbs, spices, and low-sodium marinades to season meat  Baking, broiling, grilling, and roasting are the healthiest ways to cook meats  Lean cuts can be panbroiled or stir-fried  Use a nonstick pan, canola oil, or olive oil instead of butter or margarine  Don't serve meat with high-fat sauces and gravies  Poultry  Chicken breasts are a good choice because they are low in fat and high in protein  Only eat duck and goose once in a while, because they are higher in saturated fat  Remove skin and visible fat before cooking  Baking, broiling, grilling, and roasting are the healthiest ways to [employer]  Skinless poultry can be pan broiled or stir fried  Use a nonstick pan, canola oil, or olive oil instead of butter or margarine  Seafood  Most seafood is high in healthy polyunsaturated fats  Healthy omega-3 fatty acids also are found in some fish, such as salmon and cold-water trout  If good-quality fresh fish isn't available, buy frozen fish  To prepare fish, you should poach, steam, bake, broil, or grill it      PROTEIN   INSTEAD OF THIS: TRY THIS:   Prime and marbled cuts of meat Select-grade lean beef, such as round, sirloin, and loin cuts   Pork spare ribs and ochoa Lean pork, such as tenderloin and loin chop, turkey ochoa, tofu ochoa   Regular ground beef Lean or extra-lean ground beef, ground chicken or turkey, tempeh, or beans   Lunch meats, such as pepperoni, salami, bologna, and liverwurst Lean lunch meats, such as turkey, chicken, and ham   Regular hot dogs and sausage Fat-free hot dogs, turkey dogs, tofu hot dogs   Breaded fish sticks and cakes, fish canned in oil, or seafood prepared with butter or served with high-fat sauce Fish (fresh, frozen, or canned in water), grilled fish sticks and cakes, or shellfish     Dairy  Choose low-fat, skim, or nondairy milk, such as soy, rice, or almond milk  Try low-fat or part-skim cheeses and other dairy products, or choose smaller portions of foods high in saturated fat  Yogurt can replace sour cream in many recipes  It is important to pick yogurt without added sugar  Try mixing yogurt with fruit for dessert  Sorbet and frozen yogurt are lower in fat than ice cream     DAIRY   INSTEAD OF THIS: TRY THIS:   Whole milk Skim (nonfat), 1% or 2% (low fat), or nondairy milk, such as soy, rice, almond, or cashew milk   Cream or evaporated milk Evaporated skim milk   Regular buttermilk Low-fat buttermilk   Yogurt made with whole milk Low-fat or nonfat yogurt   Regular cheese, including American, blue, Brie, cheddar, Raul, and Parmesan Low-fat cheese with less than 3 g fat per serving, or nondairy soy cheese   Regular cottage cheese Low-fat cottage cheese (less than 2% fat)   Regular cream cheese Low-fat cream cheese with less than 3 g fat per 1 oz, or skim ricotta   Ice cream Sorbet, sherbet, or frozen yogurt with less than 3 g fat per ½-cup serving     Fats and Oils  Although high-fat foods are higher in calories, they can help you feel satisfied with eating less  Don't be afraid to have fats in your diet, but try to limit saturated and trans fats  You need saturated and unsaturated fats in your diet, but most Americans get too much saturated fat  Heart disease, diabetes, some cancers, and arthritis have been linked to diets high in saturated fat, particularly saturated fats from animal products  FATS & OILS   INSTEAD OF THIS: TRY THIS:   Cookies Fruit or whole-grain cookies   Shortening, butter, and margarine Olive, canola, and soybean oils   Regular mayonnaise Yogurt   Regular salad dressing Vinaigrette with olive oil and vinegar   Butter or fat to grease pans Nonstick cooking spray, olive oil, or canola oil           Beverages  It is important that you stay hydrated  However, drinks that contain sugar are not healthy  This includes fruit juices, soda, sports and energy drinks, sweetened or flavored milk, and sweet tea  Artificial sweeteners may also be bad for your health  Drink mostly water or other unsweetened drinks  Don't drink too much alcohol  Women should have no more than one drink per day  Men should have no more than two drinks per day  Exercises    Set an Exercise Goal & Make a Plan  If youre ready to start getting active, its time to set a goal and make a plan  Staying Motivated  Its easy to start an exercise routine once youve decided its time for a change, but keeping it up is a challenge for many people  Positive Self-Talk Makes a Difference  The conversations you have with yourself about physical activity and your fitness abilities can have an impact on your performance  Overcoming Barriers to Activity Think about what is keeping you from being active and then check out some of our solutions to the most common barriers to physical activity  · Most adults with with type 1 and type 2 diabetes should engage in 150 min or more of moderate-to-vigorous intensity physical activity per week, spread over at least 3 days/week, with no more than 2 consecutive days without activity  [place] durations (minimum 75 min/week) of vigorous-intensity or interval training may be sufficient for younger and more physically fit individuals  · Adults with type 1  and type 2  diabetes should engage in 2-3 sessions/week of resistance exercise on nonconsecutive days  · All adults, and particularly those with type 2 diabetes, should decrease the amount of time spent in daily sedentary behavior  Prolonged sitting should be interrupted every 30 min for blood glucose benefits, particularly in adults with type 2 diabetes  · Flexibility training and balance training are recommended 2-3 times/week for older adults with diabetes   Yoga and corie chi may be included based on individual preferences to increase flexibility, muscular strength, and balance  Patient Education     [employer] Healthy Eating Plate [facility]  Department of Agriculture, Choose My Plate [employer] com br  Http://care  diabetesjournals  org/content/40/Supplement_1/S33    [employer]  org: Exercise & Fitness   http://familySeenctor  org/familydoctor/en/prevention-wellness/exercise-fitness html     American Diabetes Association (ADA): Weight Loss   https://www [place].me/  org/food-and-fitness/fitness/weight-loss/? utm_source=WWW&utm_medium=DropDownFF&utm_content=WeightLoss&utm_camp aign=CON     My Fitness Pal   http://www  [employer]   Online nutrition and calorie tracker  National Diabetes Education Program (NDEP): Tasty Recipes for People with Diabetes and Their Kvng Hails (English and Urdu)   https://www rc net/     [place] Recipes is a bilingual booklet filled with recipes specifically designed for Latin Americans  Recipes are accompanied by their nutritional facts table  The booklet also includes diabetes health information and resources  FamilyOncothyreonctor  org: Body Mass Index Calculator   http://familySeenctor  org/familydoctor/en/health-tools/bmi-calculator html     [employer]  org: Food and Nutrition Topics   http://familySeenctor  org/familydoctor/en/prevention-wellness/food-nutrition  html     ADA: Preventing Diabetes with Good Nutrition   https://www [place].me/  org/advocate/our-priorities/prevention/preventing-diabetes-nutrition  html     Health Power for Minorities: 10 Tips about Diabetes Prevention and Control   [place]/HealthChannelDetails  aspx?tw=294

## 2019-01-23 NOTE — ASSESSMENT & PLAN NOTE
Diabetes type 2 - controlled   Lab Results   Component Value Date    HGBA1C 6 3 09/26/2018   Ha1c today 5  7! Medication compliance: compliant all of the time   Home glucose monitoring: twice a month  Fasting BS ranges between    Diet compliance: compliant all of the time   Eye exam UTD - yes, June 2018   Foot exam UTD - yes, 12/15/18   Urine microalbumin collected in the past 12 year - yes   ACEi - no, d/c by cardiology due to hypotension    Statin - yes   Low dose ASA therapy if a male > 48 or females > 60 with additional cardiac risk factors - no   Hepatitis Vaccine received ( ages 19-63) - yes   Received PCV- yes, date 11/4/2008  Discussed diet and exercise as part of the diabetic lifestyle
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
## 2019-01-23 NOTE — ASSESSMENT & PLAN NOTE
Stable, asymptomatic  Last TSH performed November 2018 was 2 9  Currently on Synthroid 25 mcg daily  Will repeat in March   Continue current dose

## 2019-01-23 NOTE — PROGRESS NOTES
Syed Sneed 1960 female MRN: [id number]    Family Medicine Follow-up Visit    ASSESSMENT/PLAN  Problem List Items Addressed This Visit     HTN (hypertension)     Missed diagnosis, blood pressure is actually on the lower end of normal and has had previous hypotensive episodes  Lisinopril ordered in the past for renal protection given her history of diabetes  Blood pressure today 108/72, well controlled and within the desirable range  Will continue to monitor off antihypertensive         Bipolar disorder ([facility] )    Relevant Orders    CK (with reflex to MB)    Type 2 diabetes mellitus, without long-term current use of insulin ([facility] [address] ) - Primary     Diabetes type 2 - controlled   Lab Results   Component Value Date    HGBA1C 6 3 09/26/2018   Ha1c today 5  7! Medication compliance: compliant all of the time   Home glucose monitoring: twice a month  Fasting BS ranges between    Diet compliance: compliant all of the time   Eye exam UTD - yes, June 2018   Foot exam UTD - yes, 12/15/18   Urine microalbumin collected in the past 12 year - yes   ACEi - no, d/c by cardiology due to hypotension    Statin - yes   Low dose ASA therapy if a male > 48 or females > 60 with additional cardiac risk factors - no   Hepatitis Vaccine received ( ages 19-63) - yes   Received PCV- yes, date 11/4/2008  Discussed diet and exercise as part of the diabetic lifestyle  Hypothyroidism     Stable, asymptomatic  Last TSH performed November 2018 was 2 9  Currently on Synthroid 25 mcg daily  Will repeat in March   Continue current dose                 Future Appointments  Date Time Provider [place] Alanis   3/27/2019 9:00 AM Elie Iqbal MD NEURO [facility]          SUBJECTIVE  CC: Follow-up      HPI:  Syed Sneed is a 66 y o  female with h o CP, Bipolar ds, severe cognitive impairment, HLD, DM, anemia, gait instability, osteoporosis  who presents for follow up of chronic conditions  Doing well today   Had a  CXR and BW this morning to follow up recent hospitlization for hypernatremia and probable PNA  Saw Dr Nataliya Anne this morning with PMR  Home  PT/OT was ordered for 4-6wks to help reduce spasticity following the last hospitalization  Already has a script for BW with cardiology and psychiatry which will be done 3/7/19  Review of Systems   Constitutional: Negative for appetite change, chills, fever and unexpected weight change  Respiratory: Negative for cough, shortness of breath and wheezing  Cardiovascular: Negative for leg swelling  Gastrointestinal: Positive for constipation (chronic )  Negative for nausea and vomiting  Genitourinary: Negative for difficulty urinating, frequency and urgency  Skin: Negative  Psychiatric/Behavioral: Negative for behavioral problems and sleep disturbance  Historical Information   The patient history was reviewed as follows:    Past Medical History:   Diagnosis Date    Adjustment disorder     Anemia     Bipolar 1 disorder (HCC)     Cerebral palsy (HCC)     Chronic hypernatremia 2/6/2016    Closed fracture of left hip ([facility] ) 1/19/2016    Closed left hip fracture (HCC)     no surgery    Constipation     Diabetes mellitus ([facility] )     Disease of thyroid gland     Diverticulosis     Fracture of multiple ribs of right side     Hip fracture ([facility] ) 07/26/2015    left    Hyperlipidemia     Hypotension     Impulse control disorder     Incontinence     Intellectual disability due to developmental disorder, unspecified     Microalbuminuria     Osteopathia     Osteoporosis     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HCC)     Thrombocytopenia ([facility] [facility] ) 7/31/2015     Past Surgical History:   Procedure Laterality Date    ABDOMINAL SURGERY      COLECTOMY MIN      re-anastomosis 7/22/16    COLONOSCOPY N/A 7/21/2016    Procedure: COLONOSCOPY;  Surgeon: Donte Milton MD;  [facility];   Service:     COLONOSCOPY N/A 1/31/2016    Procedure: COLONOSCOPY;  Surgeon: Donte Milton MD;  [facility];  Service:     COLONOSCOPY N/A 7/25/2017    Procedure: COLONOSCOPY;  Surgeon: Rebekah Arevalo MD;  [facility]; Service: Colorectal    COLOSTOMY      EXPLORATORY LAPAROTOMY W/ BOWEL RESECTION N/A 1/31/2016    Procedure: exploratory laparotomy, left sigmoidectomy, coloproctostomy, take down splenic flexure, loop colostomy;  Surgeon: Rebekah Arevalo MD;  [facility];  Service:     SC CLOSE ENTEROSTOMY N/A 7/22/2016    Procedure: SEGMENTAL COLECTOMY WITH COLOCOLOSTOMY;  Surgeon: Rebekah Arevalo MD;  [facility];  Service: Colorectal     Family History   Problem Relation Age of Onset    Alcohol abuse Mother     Alcohol abuse Father     Diabetes Sister       Social History   History   Alcohol Use No     History   Drug Use No     History   Smoking Status    Never Smoker   Smokeless Tobacco    Never Used       Medications:     Current Outpatient Prescriptions:     acetaminophen (TYLENOL) 325 mg tablet, Take 2 tablets (650 mg total) by mouth every 6 (six) hours as needed (pain, fever greater than 100 4), Disp: 240 tablet, Rfl: 0    ARIPiprazole (ABILIFY) 30 mg tablet, Take 1 tablet (30 mg) by mouth daily at 8 pm, Disp: , Rfl: 0    baclofen 10 mg tablet, Take 1 tablet (10 mg total) by mouth 3 (three) times a day, Disp: 90 tablet, Rfl: 6    benzonatate (TESSALON PERLES) 100 mg capsule, Take 1 capsule (100 mg total) by mouth every 8 (eight) hours as needed for cough, Disp: 30 capsule, Rfl: 0    calcium carbonate 1250 MG capsule, Take 500 mg by mouth daily  , Disp: , Rfl:     carbamide peroxide (DEBROX) 6 5 % otic solution, Instill 2 drops daily x5 days to affected ear as needed for cerumen, Disp: 15 mL, Rfl: 0    Cholecalciferol (VITAMIN D) 2000 UNITS tablet, Take 2,000 Units by mouth daily  , Disp: , Rfl:     Citalopram Hydrobromide (CELEXA PO), Take 40 mg by mouth every morning  , Disp: , Rfl:     denosumab (PROLIA) 60 mg/mL, Inject 1 mL (60 mg total) under the skin once for 1 dose every 6 months at PCP for osteoporosis, Disp: 1 mL, Rfl: 0    Diaper Rash Products (A+D PREVENT) OINT, Apply topically, Disp: , Rfl:     divalproex sodium (DEPAKOTE) 500 mg EC tablet, Take 2 tablets (1,000 mg total) by mouth daily every morning for bipolar, Disp: , Rfl: 0    docosanol (ABREVA) 10 %, Apply topically 5 (five) times a day Apply and rub in 5x a day until healed as needed for lesion, Disp: , Rfl: 0    Docusate Sodium (COLACE PO), Take 100 mg by mouth 2 (two) times a day Hold one day for loose stools  , Disp: , Rfl:     ferrous sulfate 324 (65 Fe) mg, Take 325 mg by mouth 2 (two) times a day , Disp: , Rfl:     fexofenadine (ALLEGRA) 180 MG tablet, Take 180 mg by mouth as needed  , Disp: , Rfl:     hydrocortisone 1 % cream, Apply topically to affected area twice daily as needed for rash, Disp: 30 g, Rfl: 0    levothyroxine (SYNTHROID) 25 mcg tablet, Take 1 tablet (25 mcg) by mouth every morning, Disp: , Rfl: 0    LORazepam (ATIVAN) 0 5 mg tablet, Take 0 5 mg by mouth 2 (two) times a day   , Disp: , Rfl:     MetFORMIN HCl (GLUCOPHAGE PO), Take 1,000 mg by mouth 2 (two) times a day , Disp: , Rfl:     miconazole (NEOSPORIN AF) 2 % cream, Apply topically 2 (two) times a day as needed, Disp: , Rfl:     nitrofurantoin (MACRODANTIN) 50 mg capsule, Take 1 capsule by mouth Bedtime   , Disp: , Rfl:     olopatadine HCl (PATADAY) 0 2 % opth drops, Administer 1 drop to both eyes as needed, Disp: , Rfl:     omeprazole (PriLOSEC) 20 mg delayed release capsule, Take 20 mg by mouth daily, Disp: , Rfl:     Oxybutynin Chloride (DITROPAN XL PO), Take 5 mg by mouth 3 (three) times a day   , Disp: , Rfl:     Polyethylene Glycol 3350 (MIRALAX PO), Take 17 g by mouth every morning  , Disp: , Rfl:     polyvinyl alcohol-povidone (REFRESH) 1 4-0 6 % ophthalmic solution, Apply to eye, Disp: , Rfl:     simvastatin (ZOCOR) 20 mg tablet, Take 1 tablet (20 mg total) by mouth daily at bedtime at 4 pm for hyperlipidemia, Disp: , Rfl: 0    traZODone (DESYREL) 100 mg tablet, Take 1 tablet (100 mg total) by mouth daily at  8 pm for depression, Disp: , Rfl: 0  No Known Allergies    OBJECTIVE    Vitals:   Vitals:    01/23/19 1426   BP: 108/72   BP Location: Right arm   Patient Position: Sitting   Cuff Size: Standard   Pulse: 66   Resp: 12   Temp: (!) 96 °F (35 6 °C)   TempSrc: Tympanic   Weight: 46 5 kg (102 lb 9 6 oz)   Height: 5' 1" (1 549 m)           Physical Exam   Constitutional: She appears well-developed and well-nourished  Sleepy    Eyes: EOM are normal  Right eye exhibits no discharge  Left eye exhibits no discharge  Neck: No JVD present  Cardiovascular: Normal rate, regular rhythm and normal heart sounds  No murmur heard  Pulmonary/Chest: Effort normal and breath sounds normal  No respiratory distress  She has no wheezes  She has no rales  Abdominal: Soft  She exhibits no distension  There is no tenderness  There is no rebound  Musculoskeletal: She exhibits no edema  Lymphadenopathy:     She has no cervical adenopathy  Skin: Skin is warm  Psychiatric: She has a normal mood and affect  Vitals reviewed           Hilaria Jones MD, PGY-2  [facility]   1/23/2019

## 2019-03-05 ENCOUNTER — HOSPITAL ENCOUNTER (EMERGENCY)
Facility: HOSPITAL | Age: 59
Discharge: HOME | End: 2019-03-05
Attending: EMERGENCY MEDICINE
Payer: COMMERCIAL

## 2019-03-05 VITALS
OXYGEN SATURATION: 97 % | RESPIRATION RATE: 16 BRPM | HEART RATE: 66 BPM | SYSTOLIC BLOOD PRESSURE: 110 MMHG | WEIGHT: 100.75 LBS | TEMPERATURE: 97.5 F | DIASTOLIC BLOOD PRESSURE: 58 MMHG | BODY MASS INDEX: 19.04 KG/M2

## 2019-03-05 DIAGNOSIS — S60.221A CONTUSION OF RIGHT HAND, INITIAL ENCOUNTER: ICD-10-CM

## 2019-03-05 DIAGNOSIS — S00.83XA FACIAL CONTUSION, INITIAL ENCOUNTER: Primary | ICD-10-CM

## 2019-03-05 PROCEDURE — 99283 EMERGENCY DEPT VISIT LOW MDM: CPT

## 2019-03-05 NOTE — DISCHARGE INSTRUCTIONS
Please take 4:00 a m  Medications upon returning to facility  Please return to full activities/day program without any restrictions

## 2019-03-06 NOTE — ED PROVIDER NOTES
History  Chief Complaint   Patient presents with    Fall     Pt fell from wheelchair and struck face  Staff from group home note redness/swelling to nose, above left eye  Also bruising to right hand  History provided by:  Caregiver  History limited by:  Patient nonverbal  Fall   Mechanism of injury: fall    Injury location:  Face  Facial injury location:  Face and nose  Incident location:  Outdoors  Time since incident:  1 hour  Arrived directly from scene: no    Fall:     Fall occurred: Patient fell getting out of her wheelchair  Impact surface:  [place]    Point of impact: Right hand left knee than face  Protective equipment: none    Prior to arrival data:     Blood loss:  None    Loss of consciousness: no      Airway interventions:  None    Medications administered:  None    Immobilization:  None  Current pain details:     Pain quality:  Unable to specify    Pain Severity:  Unable to specify  Associated symptoms: no abdominal pain, no chest pain, no headaches, no nausea, no neck pain and no vomiting    Risk factors: no anticoagulation therapy        Prior to Admission Medications   Prescriptions Last Dose Informant Patient Reported? Taking? ARIPiprazole (ABILIFY) 30 mg tablet  Care Giver No No   Sig: Take 1 tablet (30 mg) by mouth daily at 8 pm   Cholecalciferol (VITAMIN D) 2000 UNITS tablet  Care Giver Yes No   Sig: Take 2,000 Units by mouth daily  Citalopram Hydrobromide (CELEXA PO)  Care Giver Yes No   Sig: Take 40 mg by mouth every morning     Diaper Rash Products (A+D PREVENT) OINT  Care Giver Yes No   Sig: Apply topically   Docusate Sodium (COLACE PO)  Care Giver Yes No   Sig: Take 100 mg by mouth 2 (two) times a day Hold one day for loose stools  LORazepam (ATIVAN) 0 5 mg tablet  Care Giver Yes No   Sig: Take 0 5 mg by mouth 2 (two) times a day  MetFORMIN HCl (GLUCOPHAGE PO)  Care Giver Yes No   Sig: Take 1,000 mg by mouth 2 (two) times a day     Oxybutynin Chloride (DITROPAN XL PO) Care Giver Yes No   Sig: Take 5 mg by mouth 3 (three) times a day  Polyethylene Glycol 3350 (MIRALAX PO)  Care Giver Yes No   Sig: Take 17 g by mouth every morning     acetaminophen (TYLENOL) 325 mg tablet  Care Giver No No   Sig: Take 2 tablets (650 mg total) by mouth every 6 (six) hours as needed (pain, fever greater than 100 4)   baclofen 10 mg tablet   No No   Sig: Take 1 tablet (10 mg total) by mouth 3 (three) times a day   benzonatate (TESSALON PERLES) 100 mg capsule  Care Giver No No   Sig: Take 1 capsule (100 mg total) by mouth every 8 (eight) hours as needed for cough   calcium carbonate 1250 MG capsule  Care Giver Yes No   Sig: Take 500 mg by mouth daily     carbamide peroxide (DEBROX) 6 5 % otic solution  Care Giver No No   Sig: Instill 2 drops daily x5 days to affected ear as needed for cerumen   denosumab (PROLIA) 60 mg/mL   No No   Sig: Inject 1 mL (60 mg total) under the skin once for 1 dose every 6 months at PCP for osteoporosis   divalproex sodium (DEPAKOTE) 500 mg EC tablet  Care Giver No No   Sig: Take 2 tablets (1,000 mg total) by mouth daily every morning for bipolar   docosanol (ABREVA) 10 %  Care Giver No No   Sig: Apply topically 5 (five) times a day Apply and rub in 5x a day until healed as needed for lesion   ferrous sulfate 324 (65 Fe) mg  Care Giver Yes No   Sig: Take 325 mg by mouth 2 (two) times a day  fexofenadine (ALLEGRA) 180 MG tablet  Care Giver Yes No   Sig: Take 180 mg by mouth as needed     hydrocortisone 1 % cream  Care Giver No No   Sig: Apply topically to affected area twice daily as needed for rash   levothyroxine (SYNTHROID) 25 mcg tablet  Care Giver No No   Sig: Take 1 tablet (25 mcg) by mouth every morning   miconazole (NEOSPORIN AF) 2 % cream  Care Giver Yes No   Sig: Apply topically 2 (two) times a day as needed   nitrofurantoin (MACRODANTIN) 50 mg capsule  Care Giver Yes No   Sig: Take 1 capsule by mouth Bedtime      olopatadine HCl (PATADAY) 0 2 % opth drops Care Giver Yes No   Sig: Administer 1 drop to both eyes as needed   omeprazole (PriLOSEC) 20 mg delayed release capsule  Care Giver Yes No   Sig: Take 20 mg by mouth daily   polyvinyl alcohol-povidone (REFRESH) 1 4-0 6 % ophthalmic solution  Care Giver Yes No   Sig: Apply to eye   simvastatin (ZOCOR) 20 mg tablet  Care Giver No No   Sig: Take 1 tablet (20 mg total) by mouth daily at bedtime at 4 pm for hyperlipidemia   traZODone (DESYREL) 100 mg tablet  Care Giver No No   Sig: Take 1 tablet (100 mg total) by mouth daily at  8 pm for depression      Facility-Administered Medications: None       Past Medical History:   Diagnosis Date    Adjustment disorder     Anemia     Bipolar 1 disorder (HCC)     Cerebral palsy (HCC)     Chronic hypernatremia 2/6/2016    [facility] 1/19/2016    Closed left hip fracture (HCC)     no surgery    Constipation     Diabetes mellitus ([facility] [address] )     Disease of thyroid gland     Diverticulosis     Fracture of multiple ribs of right side     [facility]2015    left    Hyperlipidemia     Hypotension     Impulse control disorder     Incontinence     Intellectual disability due to developmental disorder, unspecified     Microalbuminuria     Osteopathia     Osteoporosis     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[facility])     Thrombocytopenia ([facility] [address] ) 7/31/2015       Past Surgical History:   Procedure Laterality Date    ABDOMINAL SURGERY      COLECTOMY MIN      re-anastomosis 7/22/16    COLONOSCOPY N/A 7/21/2016    Procedure: COLONOSCOPY;  Surgeon: Donte Milton MD;  [facility]; Service:     COLONOSCOPY N/A 1/31/2016    Procedure: COLONOSCOPY;  Surgeon: Donte Milton MD;  [facility];  Service:     COLONOSCOPY N/A 7/25/2017    Procedure: COLONOSCOPY;  Surgeon: Donte Milton MD;  [facility];   Service: Colorectal    COLOSTOMY      EXPLORATORY LAPAROTOMY W/ BOWEL RESECTION N/A 1/31/2016    Procedure: exploratory laparotomy, left sigmoidectomy, coloproctostomy, take down splenic flexure, loop colostomy;  Surgeon: Rebekah Arevalo MD;  [facility];  Service:     [facility] CLOSE ENTEROSTOMY N/A 7/22/2016    Procedure: SEGMENTAL COLECTOMY WITH COLOCOLOSTOMY;  Surgeon: Rebekah Arevalo MD;  [facility];  Service: Colorectal       Family History   Problem Relation Age of Onset    Alcohol abuse Mother     Alcohol abuse Father     Diabetes Sister      I have reviewed and agree with the history as documented  Social History     Tobacco Use    Smoking status: Never Smoker    Smokeless tobacco: Never Used   Substance Use Topics    Alcohol use: No    Drug use: No        Review of Systems   Constitutional: Negative for activity change, chills, diaphoresis and fever  HENT: Negative for congestion, sinus pressure and sore throat  Eyes: Negative for pain and visual disturbance  Respiratory: Negative for cough, chest tightness, shortness of breath, wheezing and stridor  Cardiovascular: Negative for chest pain and palpitations  Gastrointestinal: Negative for abdominal distention, abdominal pain, constipation, diarrhea, nausea and vomiting  Genitourinary: Negative for dysuria and frequency  Musculoskeletal: Negative for neck pain and neck stiffness  Skin: Negative for rash  Neurological: Negative for dizziness, speech difficulty, light-headedness, numbness and headaches  Physical Exam  Physical Exam   Constitutional: She appears well-developed  No distress  HENT:   Head: Normocephalic  Small contusion over nose, nontender over nasal bridge   Eyes: Pupils are equal, round, and reactive to light  Neck: Normal range of motion  Neck supple  No tracheal deviation present  Cardiovascular: Normal rate, regular rhythm, normal heart sounds and intact distal pulses  No murmur heard  Pulmonary/Chest: Effort normal and breath sounds normal  No stridor  No respiratory distress     Abdominal: Soft  She exhibits no distension  There is no tenderness  There is no rebound and no guarding  Musculoskeletal: Normal range of motion  Neurological: She is alert  Skin: Skin is warm and dry  She is not diaphoretic  No erythema  No pallor  Small contusion over right hand   Psychiatric: She has a normal mood and affect  Vitals reviewed        Vital Signs  ED Triage Vitals [03/05/19 1707]   Temperature Pulse Respirations Blood Pressure SpO2   97 5 °F (36 4 °C) 66 16 110/58 97 %      Temp Source Heart Rate Source Patient Position - Orthostatic VS BP Location FiO2 (%)   Axillary -- -- -- --      Pain Score       --           Vitals:    03/05/19 1707   BP: 110/58   Pulse: 66       Visual Acuity      ED Medications  Medications - No data to display    Diagnostic Studies  Results Reviewed     None                 No orders to display              Procedures  Procedures       Phone Contacts  ED Phone Contact    ED Course                               MDM  Number of Diagnoses or Management Options  Contusion of right hand, initial encounter: new and requires workup  Facial contusion, initial encounter: new and requires workup  Diagnosis management comments: Fall at wheelchair onto the floor, small contusion over right hand, small contusion over her nose/face, nontender over the nasal bridge no indication for imaging at this time       Amount and/or Complexity of Data Reviewed  Decide to obtain previous medical records or to obtain history from someone other than the patient: yes  Obtain history from someone other than the patient: yes  Review and summarize past medical records: yes        Disposition  Final diagnoses:   Facial contusion, initial encounter   Contusion of right hand, initial encounter     Time reflects when diagnosis was documented in both MDM as applicable and the Disposition within this note     Time User Action Codes Description Comment    3/5/2019  5:40 PM Sean Valiente Add [S00 [id number]] Facial contusion, initial encounter     3/5/2019  5:40 PM Joao Barker Add [G95 [id number]] Contusion of right hand, initial encounter       ED Disposition     ED Disposition Condition Date/Time Comment    Discharge Stable Tue Mar 5, 2019  5:39 PM Lb Alexandra discharge to home/self care  Follow-up Information     Follow up With Specialties Details Why Contact Info Additional [address] Emergency Department Emergency Medicine Go to  If symptoms worsen [address] [facility] [address] [phone] [facility], [address], [place], [place], [address]          Discharge Medication List as of 3/5/2019  5:40 PM      CONTINUE these medications which have NOT CHANGED    Details   acetaminophen (TYLENOL) 325 mg tablet Take 2 tablets (650 mg total) by mouth every 6 (six) hours as needed (pain, fever greater than 100 4), Starting Fri 6/8/2018, Print      ARIPiprazole (ABILIFY) 30 mg tablet Take 1 tablet (30 mg) by mouth daily at 8 pm, No Print      baclofen 10 mg tablet Take 1 tablet (10 mg total) by mouth 3 (three) times a day, Starting Wed 1/23/2019, Print      benzonatate (TESSALON PERLES) 100 mg capsule Take 1 capsule (100 mg total) by mouth every 8 (eight) hours as needed for cough, Starting Sat 12/22/2018, Print      calcium carbonate 1250 MG capsule Take 500 mg by mouth daily  , Historical Med      carbamide peroxide (DEBROX) 6 5 % otic solution Instill 2 drops daily x5 days to affected ear as needed for cerumen, No Print      Cholecalciferol (VITAMIN D) 2000 UNITS tablet Take 2,000 Units by mouth daily  , Historical Med      Citalopram Hydrobromide (CELEXA PO) Take 40 mg by mouth every morning  , Historical Med      denosumab (PROLIA) 60 mg/mL Inject 1 mL (60 mg total) under the skin once for 1 dose every 6 months at PCP for osteoporosis, Starting Tue 10/2/2018, No Print      Diaper Rash Products (A+D PREVENT) OINT Apply topically, Starting Mon 5/22/2017, Historical Med      divalproex sodium (DEPAKOTE) 500 mg EC tablet Take 2 tablets (1,000 mg total) by mouth daily every morning for bipolar, Starting Tue 10/2/2018, No Print      docosanol (ABREVA) 10 % Apply topically 5 (five) times a day Apply and rub in 5x a day until healed as needed for lesion, Starting Tue 10/2/2018, No Print      Docusate Sodium (COLACE PO) Take 100 mg by mouth 2 (two) times a day Hold one day for loose stools  , Historical Med      ferrous sulfate 324 (65 Fe) mg Take 325 mg by mouth 2 (two) times a day , Historical Med      fexofenadine (ALLEGRA) 180 MG tablet Take 180 mg by mouth as needed  , Historical Med      hydrocortisone 1 % cream Apply topically to affected area twice daily as needed for rash, No Print      levothyroxine (SYNTHROID) 25 mcg tablet Take 1 tablet (25 mcg) by mouth every morning, No Print      LORazepam (ATIVAN) 0 5 mg tablet Take 0 5 mg by mouth 2 (two) times a day   , Historical Med      MetFORMIN HCl (GLUCOPHAGE PO) Take 1,000 mg by mouth 2 (two) times a day , Historical Med      miconazole (NEOSPORIN AF) 2 % cream Apply topically 2 (two) times a day as needed, Historical Med      nitrofurantoin (MACRODANTIN) 50 mg capsule Take 1 capsule by mouth Bedtime   , Starting Mon 4/11/2016, Historical Med      olopatadine HCl (PATADAY) 0 2 % opth drops Administer 1 drop to both eyes as needed, Historical Med      omeprazole (PriLOSEC) 20 mg delayed release capsule Take 20 mg by mouth daily, Historical Med      Oxybutynin Chloride (DITROPAN XL PO) Take 5 mg by mouth 3 (three) times a day   , Historical Med      Polyethylene Glycol 3350 (MIRALAX PO) Take 17 g by mouth every morning  , Historical Med      polyvinyl alcohol-povidone (REFRESH) 1 4-0 6 % ophthalmic solution Apply to eye, Historical Med      simvastatin (ZOCOR) 20 mg tablet Take 1 tablet (20 mg total) by mouth daily at bedtime at 4 pm for hyperlipidemia, Starting Tue 10/2/2018, No Print      traZODone (DESYREL) 100 mg tablet Take 1 tablet (100 mg total) by mouth daily at  8 pm for depression, No Print           No discharge procedures on file      ED Provider  Electronically Signed by           Mulugeta López DO  03/05/19 9912

## 2019-03-07 ENCOUNTER — TRANSCRIBE ORDERS (OUTPATIENT)
Dept: LAB | Facility: CLINIC | Age: 59
End: 2019-03-07

## 2019-03-07 ENCOUNTER — APPOINTMENT (OUTPATIENT)
Dept: LAB | Facility: CLINIC | Age: 59
End: 2019-03-07
Payer: COMMERCIAL

## 2019-03-07 DIAGNOSIS — F31.9 BIPOLAR AFFECTIVE DISORDER, REMISSION STATUS UNSPECIFIED (HCC): Primary | ICD-10-CM

## 2019-03-07 DIAGNOSIS — F31.9 BIPOLAR AFFECTIVE DISORDER, REMISSION STATUS UNSPECIFIED (HCC): ICD-10-CM

## 2019-03-07 LAB
ALBUMIN SERPL BCP-MCNC: 3.2 G/DL (ref 3.5–5)
ALP SERPL-CCNC: 45 U/L (ref 46–116)
ALT SERPL W P-5'-P-CCNC: 19 U/L (ref 12–78)
AMMONIA PLAS-SCNC: <10 UMOL/L (ref 11–35)
AST SERPL W P-5'-P-CCNC: 16 U/L (ref 5–45)
BASOPHILS # BLD AUTO: 0.04 THOUSANDS/ΜL (ref 0–0.1)
BASOPHILS NFR BLD AUTO: 1 % (ref 0–1)
BILIRUB DIRECT SERPL-MCNC: 0.04 MG/DL (ref 0–0.2)
BILIRUB SERPL-MCNC: 0.2 MG/DL (ref 0.2–1)
CHOLEST SERPL-MCNC: 147 MG/DL (ref 50–200)
CK SERPL-CCNC: 30 U/L (ref 26–192)
EOSINOPHIL # BLD AUTO: 0.28 THOUSAND/ΜL (ref 0–0.61)
EOSINOPHIL NFR BLD AUTO: 4 % (ref 0–6)
ERYTHROCYTE [DISTWIDTH] IN BLOOD BY AUTOMATED COUNT: 13.2 % (ref 11.6–15.1)
EST. AVERAGE GLUCOSE BLD GHB EST-MCNC: 117 MG/DL
GLUCOSE P FAST SERPL-MCNC: 92 MG/DL (ref 65–99)
HBA1C MFR BLD: 5.7 % (ref 4.2–6.3)
HCT VFR BLD AUTO: 37.8 % (ref 34.8–46.1)
HDLC SERPL-MCNC: 54 MG/DL (ref 40–60)
HGB BLD-MCNC: 12.8 G/DL (ref 11.5–15.4)
IMM GRANULOCYTES # BLD AUTO: 0.03 THOUSAND/UL (ref 0–0.2)
IMM GRANULOCYTES NFR BLD AUTO: 1 % (ref 0–2)
LDLC SERPL CALC-MCNC: 71 MG/DL (ref 0–100)
LYMPHOCYTES # BLD AUTO: 1.73 THOUSANDS/ΜL (ref 0.6–4.47)
LYMPHOCYTES NFR BLD AUTO: 27 % (ref 14–44)
MCH RBC QN AUTO: 35.6 PG (ref 26.8–34.3)
MCHC RBC AUTO-ENTMCNC: 33.9 G/DL (ref 31.4–37.4)
MCV RBC AUTO: 105 FL (ref 82–98)
MONOCYTES # BLD AUTO: 0.52 THOUSAND/ΜL (ref 0.17–1.22)
MONOCYTES NFR BLD AUTO: 8 % (ref 4–12)
NEUTROPHILS # BLD AUTO: 3.83 THOUSANDS/ΜL (ref 1.85–7.62)
NEUTS SEG NFR BLD AUTO: 59 % (ref 43–75)
NONHDLC SERPL-MCNC: 93 MG/DL
NRBC BLD AUTO-RTO: 0 /100 WBCS
PLATELET # BLD AUTO: 166 THOUSANDS/UL (ref 149–390)
PMV BLD AUTO: 11.3 FL (ref 8.9–12.7)
PROLACTIN SERPL-MCNC: 11.4 NG/ML
PROT SERPL-MCNC: 7.1 G/DL (ref 6.4–8.2)
RBC # BLD AUTO: 3.6 MILLION/UL (ref 3.81–5.12)
T3 SERPL-MCNC: 0.9 NG/ML (ref 0.6–1.8)
T4 SERPL-MCNC: 9.5 UG/DL (ref 4.7–13.3)
TRIGL SERPL-MCNC: 112 MG/DL
TSH SERPL DL<=0.05 MIU/L-ACNC: 3.43 UIU/ML (ref 0.36–3.74)
VALPROATE SERPL-MCNC: 48 UG/ML (ref 50–100)
WBC # BLD AUTO: 6.43 THOUSAND/UL (ref 4.31–10.16)

## 2019-03-07 PROCEDURE — 82947 ASSAY GLUCOSE BLOOD QUANT: CPT

## 2019-03-07 PROCEDURE — 80164 ASSAY DIPROPYLACETIC ACD TOT: CPT

## 2019-03-07 PROCEDURE — 84443 ASSAY THYROID STIM HORMONE: CPT

## 2019-03-07 PROCEDURE — 80061 LIPID PANEL: CPT

## 2019-03-07 PROCEDURE — 84436 ASSAY OF TOTAL THYROXINE: CPT

## 2019-03-07 PROCEDURE — 84480 ASSAY TRIIODOTHYRONINE (T3): CPT

## 2019-03-07 PROCEDURE — 80076 HEPATIC FUNCTION PANEL: CPT

## 2019-03-07 PROCEDURE — 83036 HEMOGLOBIN GLYCOSYLATED A1C: CPT

## 2019-03-07 PROCEDURE — 36415 COLL VENOUS BLD VENIPUNCTURE: CPT

## 2019-03-07 PROCEDURE — 82550 ASSAY OF CK (CPK): CPT

## 2019-03-07 PROCEDURE — 82140 ASSAY OF AMMONIA: CPT

## 2019-03-07 PROCEDURE — 85025 COMPLETE CBC W/AUTO DIFF WBC: CPT

## 2019-03-07 PROCEDURE — 84146 ASSAY OF PROLACTIN: CPT

## 2019-03-27 ENCOUNTER — OFFICE VISIT (OUTPATIENT)
Dept: NEUROLOGY | Facility: CLINIC | Age: 59
End: 2019-03-27
Payer: COMMERCIAL

## 2019-03-27 ENCOUNTER — TELEPHONE (OUTPATIENT)
Dept: NEUROLOGY | Facility: CLINIC | Age: 59
End: 2019-03-27

## 2019-03-27 VITALS
WEIGHT: 96 LBS | DIASTOLIC BLOOD PRESSURE: 88 MMHG | BODY MASS INDEX: 18.12 KG/M2 | RESPIRATION RATE: 12 BRPM | HEART RATE: 80 BPM | HEIGHT: 61 IN | SYSTOLIC BLOOD PRESSURE: 106 MMHG

## 2019-03-27 DIAGNOSIS — G82.50 SPASTIC QUADRIPARESIS (HCC): Primary | ICD-10-CM

## 2019-03-27 PROCEDURE — 99215 OFFICE O/P EST HI 40 MIN: CPT | Performed by: PHYSICAL MEDICINE & REHABILITATION
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
## 2019-04-08 ENCOUNTER — APPOINTMENT (EMERGENCY)
Dept: RADIOLOGY | Facility: HOSPITAL | Age: 59
DRG: 641 | End: 2019-04-08
Payer: COMMERCIAL

## 2019-04-08 ENCOUNTER — HOSPITAL ENCOUNTER (INPATIENT)
Facility: HOSPITAL | Age: 59
LOS: 3 days | Discharge: HOME | DRG: 641 | End: 2019-04-12
Attending: EMERGENCY MEDICINE | Admitting: INTERNAL MEDICINE
Payer: COMMERCIAL

## 2019-04-08 DIAGNOSIS — E87.0 HYPERNATREMIA: ICD-10-CM

## 2019-04-08 DIAGNOSIS — R41.82 ALTERED MENTAL STATUS: Primary | ICD-10-CM

## 2019-04-08 DIAGNOSIS — E87.2 LACTIC ACIDOSIS: ICD-10-CM

## 2019-04-08 PROBLEM — E87.20 LACTIC ACIDOSIS: Status: ACTIVE | Noted: 2019-04-08

## 2019-04-08 PROBLEM — I10 ESSENTIAL HYPERTENSION: Chronic | Status: ACTIVE | Noted: 2017-03-27

## 2019-04-08 LAB
ALBUMIN SERPL BCP-MCNC: 3.1 G/DL (ref 3.5–5)
ALP SERPL-CCNC: 48 U/L (ref 46–116)
ALT SERPL W P-5'-P-CCNC: 17 U/L (ref 12–78)
ANION GAP SERPL CALCULATED.3IONS-SCNC: 6 MMOL/L (ref 4–13)
APTT PPP: 20 SECONDS (ref 26–38)
AST SERPL W P-5'-P-CCNC: 16 U/L (ref 5–45)
BASOPHILS # BLD AUTO: 0.03 THOUSANDS/ΜL (ref 0–0.1)
BASOPHILS NFR BLD AUTO: 0 % (ref 0–1)
BILIRUB SERPL-MCNC: 0.2 MG/DL (ref 0.2–1)
BILIRUB UR QL STRIP: NEGATIVE
BUN SERPL-MCNC: 23 MG/DL (ref 5–25)
CALCIUM SERPL-MCNC: 9.7 MG/DL (ref 8.3–10.1)
CHLORIDE SERPL-SCNC: 104 MMOL/L (ref 100–108)
CLARITY UR: CLEAR
CO2 SERPL-SCNC: 34 MMOL/L (ref 21–32)
COLOR UR: ABNORMAL
CREAT SERPL-MCNC: 0.97 MG/DL (ref 0.6–1.3)
EOSINOPHIL # BLD AUTO: 0.12 THOUSAND/ΜL (ref 0–0.61)
EOSINOPHIL NFR BLD AUTO: 1 % (ref 0–6)
ERYTHROCYTE [DISTWIDTH] IN BLOOD BY AUTOMATED COUNT: 12.9 % (ref 11.6–15.1)
GFR SERPL CREATININE-BSD FRML MDRD: 65 ML/MIN/1.73SQ M
GLUCOSE SERPL-MCNC: 196 MG/DL (ref 65–140)
GLUCOSE SERPL-MCNC: 98 MG/DL (ref 65–140)
GLUCOSE UR STRIP-MCNC: NEGATIVE MG/DL
HCT VFR BLD AUTO: 37.8 % (ref 34.8–46.1)
HGB BLD-MCNC: 12.4 G/DL (ref 11.5–15.4)
HGB UR QL STRIP.AUTO: NEGATIVE
IMM GRANULOCYTES # BLD AUTO: 0.03 THOUSAND/UL (ref 0–0.2)
IMM GRANULOCYTES NFR BLD AUTO: 0 % (ref 0–2)
INR PPP: 0.83 (ref 0.86–1.17)
KETONES UR STRIP-MCNC: ABNORMAL MG/DL
LACTATE SERPL-SCNC: 2.4 MMOL/L (ref 0.5–2)
LACTATE SERPL-SCNC: 3.7 MMOL/L (ref 0.5–2)
LEUKOCYTE ESTERASE UR QL STRIP: NEGATIVE
LYMPHOCYTES # BLD AUTO: 1.7 THOUSANDS/ΜL (ref 0.6–4.47)
LYMPHOCYTES NFR BLD AUTO: 20 % (ref 14–44)
MCH RBC QN AUTO: 35.1 PG (ref 26.8–34.3)
MCHC RBC AUTO-ENTMCNC: 32.8 G/DL (ref 31.4–37.4)
MCV RBC AUTO: 107 FL (ref 82–98)
MONOCYTES # BLD AUTO: 0.61 THOUSAND/ΜL (ref 0.17–1.22)
MONOCYTES NFR BLD AUTO: 7 % (ref 4–12)
NEUTROPHILS # BLD AUTO: 6.18 THOUSANDS/ΜL (ref 1.85–7.62)
NEUTS SEG NFR BLD AUTO: 72 % (ref 43–75)
NITRITE UR QL STRIP: NEGATIVE
NRBC BLD AUTO-RTO: 0 /100 WBCS
PH UR STRIP.AUTO: 8 [PH]
PLATELET # BLD AUTO: 129 THOUSANDS/UL (ref 149–390)
PMV BLD AUTO: 12.7 FL (ref 8.9–12.7)
POTASSIUM SERPL-SCNC: 5.5 MMOL/L (ref 3.5–5.3)
PROCALCITONIN SERPL-MCNC: <0.05 NG/ML
PROT SERPL-MCNC: 6.8 G/DL (ref 6.4–8.2)
PROT UR STRIP-MCNC: NEGATIVE MG/DL
PROTHROMBIN TIME: 11.2 SECONDS (ref 11.8–14.2)
RBC # BLD AUTO: 3.53 MILLION/UL (ref 3.81–5.12)
SODIUM SERPL-SCNC: 144 MMOL/L (ref 136–145)
SP GR UR STRIP.AUTO: 1.01 (ref 1–1.03)
UROBILINOGEN UR QL STRIP.AUTO: 0.2 E.U./DL
WBC # BLD AUTO: 8.67 THOUSAND/UL (ref 4.31–10.16)

## 2019-04-08 PROCEDURE — 36415 COLL VENOUS BLD VENIPUNCTURE: CPT | Performed by: EMERGENCY MEDICINE

## 2019-04-08 PROCEDURE — 85610 PROTHROMBIN TIME: CPT | Performed by: EMERGENCY MEDICINE

## 2019-04-08 PROCEDURE — 84145 PROCALCITONIN (PCT): CPT | Performed by: EMERGENCY MEDICINE

## 2019-04-08 PROCEDURE — 96361 HYDRATE IV INFUSION ADD-ON: CPT

## 2019-04-08 PROCEDURE — 99220 PR INITIAL OBSERVATION CARE/DAY 70 MINUTES: CPT | Performed by: PHYSICIAN ASSISTANT

## 2019-04-08 PROCEDURE — 96360 HYDRATION IV INFUSION INIT: CPT

## 2019-04-08 PROCEDURE — 85025 COMPLETE CBC W/AUTO DIFF WBC: CPT | Performed by: EMERGENCY MEDICINE

## 2019-04-08 PROCEDURE — 71045 X-RAY EXAM CHEST 1 VIEW: CPT

## 2019-04-08 PROCEDURE — 80053 COMPREHEN METABOLIC PANEL: CPT | Performed by: EMERGENCY MEDICINE

## 2019-04-08 PROCEDURE — 96365 THER/PROPH/DIAG IV INF INIT: CPT

## 2019-04-08 PROCEDURE — 99284 EMERGENCY DEPT VISIT MOD MDM: CPT | Performed by: EMERGENCY MEDICINE

## 2019-04-08 PROCEDURE — 85730 THROMBOPLASTIN TIME PARTIAL: CPT | Performed by: EMERGENCY MEDICINE

## 2019-04-08 PROCEDURE — 93005 ELECTROCARDIOGRAM TRACING: CPT

## 2019-04-08 PROCEDURE — 99285 EMERGENCY DEPT VISIT HI MDM: CPT

## 2019-04-08 PROCEDURE — 81003 URINALYSIS AUTO W/O SCOPE: CPT | Performed by: EMERGENCY MEDICINE

## 2019-04-08 PROCEDURE — 83605 ASSAY OF LACTIC ACID: CPT | Performed by: EMERGENCY MEDICINE

## 2019-04-08 PROCEDURE — 87040 BLOOD CULTURE FOR BACTERIA: CPT | Performed by: EMERGENCY MEDICINE

## 2019-04-08 PROCEDURE — 83605 ASSAY OF LACTIC ACID: CPT | Performed by: PHYSICIAN ASSISTANT

## 2019-04-08 PROCEDURE — 82948 REAGENT STRIP/BLOOD GLUCOSE: CPT

## 2019-04-08 RX ORDER — SODIUM CHLORIDE 9 MG/ML
100 INJECTION, SOLUTION INTRAVENOUS CONTINUOUS
Status: DISCONTINUED | OUTPATIENT
Start: 2019-04-08 | End: 2019-04-09

## 2019-04-08 RX ORDER — FERROUS SULFATE 325(65) MG
325 TABLET ORAL 2 TIMES DAILY WITH MEALS
Status: DISCONTINUED | OUTPATIENT
Start: 2019-04-09 | End: 2019-04-12 | Stop reason: HOSPADM

## 2019-04-08 RX ORDER — POLYETHYLENE GLYCOL 3350 17 G/17G
17 POWDER, FOR SOLUTION ORAL DAILY
Status: DISCONTINUED | OUTPATIENT
Start: 2019-04-09 | End: 2019-04-12 | Stop reason: HOSPADM

## 2019-04-08 RX ORDER — LORAZEPAM 0.5 MG/1
0.5 TABLET ORAL 2 TIMES DAILY
Status: DISCONTINUED | OUTPATIENT
Start: 2019-04-08 | End: 2019-04-12 | Stop reason: HOSPADM

## 2019-04-08 RX ORDER — OXYBUTYNIN CHLORIDE 5 MG/1
5 TABLET ORAL 3 TIMES DAILY
Status: DISCONTINUED | OUTPATIENT
Start: 2019-04-08 | End: 2019-04-12 | Stop reason: HOSPADM

## 2019-04-08 RX ORDER — CITALOPRAM 20 MG/1
40 TABLET ORAL EVERY MORNING
Status: DISCONTINUED | OUTPATIENT
Start: 2019-04-09 | End: 2019-04-12 | Stop reason: HOSPADM

## 2019-04-08 RX ORDER — ACETAMINOPHEN 325 MG/1
650 TABLET ORAL EVERY 6 HOURS PRN
Status: DISCONTINUED | OUTPATIENT
Start: 2019-04-08 | End: 2019-04-12 | Stop reason: HOSPADM

## 2019-04-08 RX ORDER — ACETAMINOPHEN 325 MG/1
650 TABLET ORAL EVERY 6 HOURS PRN
Status: DISCONTINUED | OUTPATIENT
Start: 2019-04-08 | End: 2019-04-08

## 2019-04-08 RX ORDER — ONDANSETRON 2 MG/ML
4 INJECTION INTRAMUSCULAR; INTRAVENOUS EVERY 6 HOURS PRN
Status: DISCONTINUED | OUTPATIENT
Start: 2019-04-08 | End: 2019-04-12 | Stop reason: HOSPADM

## 2019-04-08 RX ORDER — TRAZODONE HYDROCHLORIDE 100 MG/1
100 TABLET ORAL
Status: DISCONTINUED | OUTPATIENT
Start: 2019-04-08 | End: 2019-04-12 | Stop reason: HOSPADM

## 2019-04-08 RX ORDER — MELATONIN
2000 DAILY
Status: DISCONTINUED | OUTPATIENT
Start: 2019-04-09 | End: 2019-04-12 | Stop reason: HOSPADM

## 2019-04-08 RX ORDER — BACLOFEN 10 MG/1
10 TABLET ORAL 3 TIMES DAILY
Status: DISCONTINUED | OUTPATIENT
Start: 2019-04-08 | End: 2019-04-12 | Stop reason: HOSPADM

## 2019-04-08 RX ORDER — CHOLECALCIFEROL (VITAMIN D3) 50 MCG
2000 TABLET ORAL DAILY
Status: DISCONTINUED | OUTPATIENT
Start: 2019-04-09 | End: 2019-04-08

## 2019-04-08 RX ORDER — ARIPIPRAZOLE 10 MG/1
30 TABLET ORAL DAILY
Status: DISCONTINUED | OUTPATIENT
Start: 2019-04-08 | End: 2019-04-12 | Stop reason: HOSPADM

## 2019-04-08 RX ORDER — LEVOTHYROXINE SODIUM 0.03 MG/1
25 TABLET ORAL
Status: DISCONTINUED | OUTPATIENT
Start: 2019-04-09 | End: 2019-04-12 | Stop reason: HOSPADM

## 2019-04-08 RX ORDER — B-COMPLEX WITH VITAMIN C
1 TABLET ORAL
COMMUNITY
End: 2019-09-26

## 2019-04-08 RX ORDER — DIVALPROEX SODIUM 500 MG/1
1000 TABLET, DELAYED RELEASE ORAL DAILY
Status: DISCONTINUED | OUTPATIENT
Start: 2019-04-09 | End: 2019-04-12 | Stop reason: HOSPADM

## 2019-04-08 RX ORDER — CALCIUM CARBONATE 500(1250)
1 TABLET ORAL
Status: DISCONTINUED | OUTPATIENT
Start: 2019-04-09 | End: 2019-04-12 | Stop reason: HOSPADM

## 2019-04-08 RX ORDER — PRAVASTATIN SODIUM 40 MG
40 TABLET ORAL
Status: DISCONTINUED | OUTPATIENT
Start: 2019-04-09 | End: 2019-04-12 | Stop reason: HOSPADM

## 2019-04-08 RX ORDER — PANTOPRAZOLE SODIUM 40 MG/1
40 TABLET, DELAYED RELEASE ORAL
Status: DISCONTINUED | OUTPATIENT
Start: 2019-04-09 | End: 2019-04-12 | Stop reason: HOSPADM

## 2019-04-08 RX ADMIN — TRAZODONE HYDROCHLORIDE 100 MG: 100 TABLET ORAL at 21:47

## 2019-04-08 RX ADMIN — BACLOFEN 10 MG: 10 TABLET ORAL at 21:47

## 2019-04-08 RX ADMIN — SODIUM CHLORIDE 500 ML: 0.9 INJECTION, SOLUTION INTRAVENOUS at 16:05

## 2019-04-08 RX ADMIN — SODIUM CHLORIDE 100 ML/HR: 0.9 INJECTION, SOLUTION INTRAVENOUS at 20:45

## 2019-04-08 RX ADMIN — SODIUM CHLORIDE 1000 ML: 0.9 INJECTION, SOLUTION INTRAVENOUS at 15:06

## 2019-04-08 RX ADMIN — OXYBUTYNIN CHLORIDE 5 MG: 5 TABLET ORAL at 21:47

## 2019-04-08 RX ADMIN — ARIPIPRAZOLE 30 MG: 10 TABLET ORAL at 21:48

## 2019-04-08 RX ADMIN — CEFEPIME HYDROCHLORIDE 2000 MG: 2 INJECTION, POWDER, FOR SOLUTION INTRAVENOUS at 18:20

## 2019-04-09 PROBLEM — E87.20 LACTIC ACIDOSIS: Status: RESOLVED | Noted: 2019-04-08 | Resolved: 2019-04-09

## 2019-04-09 PROBLEM — E87.2 LACTIC ACIDOSIS: Status: RESOLVED | Noted: 2019-04-08 | Resolved: 2019-04-09

## 2019-04-09 LAB
ANION GAP SERPL CALCULATED.3IONS-SCNC: 4 MMOL/L (ref 4–13)
BASOPHILS # BLD AUTO: 0.03 THOUSANDS/ΜL (ref 0–0.1)
BASOPHILS NFR BLD AUTO: 1 % (ref 0–1)
BUN SERPL-MCNC: 18 MG/DL (ref 5–25)
CALCIUM SERPL-MCNC: 8.3 MG/DL (ref 8.3–10.1)
CHLORIDE SERPL-SCNC: 112 MMOL/L (ref 100–108)
CO2 SERPL-SCNC: 32 MMOL/L (ref 21–32)
CREAT SERPL-MCNC: 0.93 MG/DL (ref 0.6–1.3)
EOSINOPHIL # BLD AUTO: 0.28 THOUSAND/ΜL (ref 0–0.61)
EOSINOPHIL NFR BLD AUTO: 5 % (ref 0–6)
ERYTHROCYTE [DISTWIDTH] IN BLOOD BY AUTOMATED COUNT: 13.1 % (ref 11.6–15.1)
GFR SERPL CREATININE-BSD FRML MDRD: 69 ML/MIN/1.73SQ M
GLUCOSE P FAST SERPL-MCNC: 88 MG/DL (ref 65–99)
GLUCOSE SERPL-MCNC: 137 MG/DL (ref 65–140)
GLUCOSE SERPL-MCNC: 161 MG/DL (ref 65–140)
GLUCOSE SERPL-MCNC: 173 MG/DL (ref 65–140)
GLUCOSE SERPL-MCNC: 88 MG/DL (ref 65–140)
GLUCOSE SERPL-MCNC: 99 MG/DL (ref 65–140)
HCT VFR BLD AUTO: 30.2 % (ref 34.8–46.1)
HGB BLD-MCNC: 10 G/DL (ref 11.5–15.4)
IMM GRANULOCYTES # BLD AUTO: 0.02 THOUSAND/UL (ref 0–0.2)
IMM GRANULOCYTES NFR BLD AUTO: 0 % (ref 0–2)
LACTATE SERPL-SCNC: 1.5 MMOL/L (ref 0.5–2)
LYMPHOCYTES # BLD AUTO: 2.08 THOUSANDS/ΜL (ref 0.6–4.47)
LYMPHOCYTES NFR BLD AUTO: 35 % (ref 14–44)
MCH RBC QN AUTO: 35.2 PG (ref 26.8–34.3)
MCHC RBC AUTO-ENTMCNC: 33.1 G/DL (ref 31.4–37.4)
MCV RBC AUTO: 106 FL (ref 82–98)
MONOCYTES # BLD AUTO: 0.66 THOUSAND/ΜL (ref 0.17–1.22)
MONOCYTES NFR BLD AUTO: 11 % (ref 4–12)
NEUTROPHILS # BLD AUTO: 2.95 THOUSANDS/ΜL (ref 1.85–7.62)
NEUTS SEG NFR BLD AUTO: 48 % (ref 43–75)
NRBC BLD AUTO-RTO: 0 /100 WBCS
PLATELET # BLD AUTO: 111 THOUSANDS/UL (ref 149–390)
PMV BLD AUTO: 12.5 FL (ref 8.9–12.7)
POTASSIUM SERPL-SCNC: 4 MMOL/L (ref 3.5–5.3)
RBC # BLD AUTO: 2.84 MILLION/UL (ref 3.81–5.12)
SODIUM SERPL-SCNC: 148 MMOL/L (ref 136–145)
WBC # BLD AUTO: 6.02 THOUSAND/UL (ref 4.31–10.16)

## 2019-04-09 PROCEDURE — 80048 BASIC METABOLIC PNL TOTAL CA: CPT | Performed by: PHYSICIAN ASSISTANT

## 2019-04-09 PROCEDURE — 85025 COMPLETE CBC W/AUTO DIFF WBC: CPT | Performed by: PHYSICIAN ASSISTANT

## 2019-04-09 PROCEDURE — 82948 REAGENT STRIP/BLOOD GLUCOSE: CPT

## 2019-04-09 PROCEDURE — 83605 ASSAY OF LACTIC ACID: CPT | Performed by: PHYSICIAN ASSISTANT

## 2019-04-09 PROCEDURE — 99232 SBSQ HOSP IP/OBS MODERATE 35: CPT | Performed by: PHYSICIAN ASSISTANT

## 2019-04-09 RX ADMIN — LORAZEPAM 0.5 MG: 0.5 TABLET ORAL at 08:43

## 2019-04-09 RX ADMIN — PRAVASTATIN SODIUM 40 MG: 40 TABLET ORAL at 15:55

## 2019-04-09 RX ADMIN — BACLOFEN 10 MG: 10 TABLET ORAL at 15:55

## 2019-04-09 RX ADMIN — BACLOFEN 10 MG: 10 TABLET ORAL at 23:27

## 2019-04-09 RX ADMIN — LEVOTHYROXINE SODIUM 25 MCG: 25 TABLET ORAL at 05:39

## 2019-04-09 RX ADMIN — ARIPIPRAZOLE 30 MG: 10 TABLET ORAL at 23:28

## 2019-04-09 RX ADMIN — OXYBUTYNIN CHLORIDE 5 MG: 5 TABLET ORAL at 15:55

## 2019-04-09 RX ADMIN — PANTOPRAZOLE SODIUM 40 MG: 40 TABLET, DELAYED RELEASE ORAL at 05:39

## 2019-04-09 RX ADMIN — OXYBUTYNIN CHLORIDE 5 MG: 5 TABLET ORAL at 08:43

## 2019-04-09 RX ADMIN — SODIUM CHLORIDE 100 ML/HR: 0.9 INJECTION, SOLUTION INTRAVENOUS at 05:37

## 2019-04-09 RX ADMIN — FERROUS SULFATE TAB 325 MG (65 MG ELEMENTAL FE) 325 MG: 325 (65 FE) TAB at 15:55

## 2019-04-09 RX ADMIN — OXYBUTYNIN CHLORIDE 5 MG: 5 TABLET ORAL at 23:27

## 2019-04-10 LAB
ANION GAP SERPL CALCULATED.3IONS-SCNC: 7 MMOL/L (ref 4–13)
ANION GAP SERPL CALCULATED.3IONS-SCNC: 8 MMOL/L (ref 4–13)
BUN SERPL-MCNC: 16 MG/DL (ref 5–25)
BUN SERPL-MCNC: 19 MG/DL (ref 5–25)
CALCIUM SERPL-MCNC: 8.8 MG/DL (ref 8.3–10.1)
CALCIUM SERPL-MCNC: 9.2 MG/DL (ref 8.3–10.1)
CHLORIDE SERPL-SCNC: 110 MMOL/L (ref 100–108)
CHLORIDE SERPL-SCNC: 113 MMOL/L (ref 100–108)
CO2 SERPL-SCNC: 28 MMOL/L (ref 21–32)
CO2 SERPL-SCNC: 29 MMOL/L (ref 21–32)
CREAT SERPL-MCNC: 0.9 MG/DL (ref 0.6–1.3)
CREAT SERPL-MCNC: 0.94 MG/DL (ref 0.6–1.3)
GFR SERPL CREATININE-BSD FRML MDRD: 68 ML/MIN/1.73SQ M
GFR SERPL CREATININE-BSD FRML MDRD: 72 ML/MIN/1.73SQ M
GLUCOSE SERPL-MCNC: 130 MG/DL (ref 65–140)
GLUCOSE SERPL-MCNC: 172 MG/DL (ref 65–140)
GLUCOSE SERPL-MCNC: 259 MG/DL (ref 65–140)
GLUCOSE SERPL-MCNC: 284 MG/DL (ref 65–140)
GLUCOSE SERPL-MCNC: 330 MG/DL (ref 65–140)
GLUCOSE SERPL-MCNC: 340 MG/DL (ref 65–140)
PLATELET # BLD AUTO: 118 THOUSANDS/UL (ref 149–390)
PMV BLD AUTO: 12.2 FL (ref 8.9–12.7)
POTASSIUM SERPL-SCNC: 4 MMOL/L (ref 3.5–5.3)
POTASSIUM SERPL-SCNC: 4.2 MMOL/L (ref 3.5–5.3)
SODIUM SERPL-SCNC: 146 MMOL/L (ref 136–145)
SODIUM SERPL-SCNC: 149 MMOL/L (ref 136–145)

## 2019-04-10 PROCEDURE — 99232 SBSQ HOSP IP/OBS MODERATE 35: CPT | Performed by: PHYSICIAN ASSISTANT

## 2019-04-10 PROCEDURE — 80048 BASIC METABOLIC PNL TOTAL CA: CPT | Performed by: PHYSICIAN ASSISTANT

## 2019-04-10 PROCEDURE — 82948 REAGENT STRIP/BLOOD GLUCOSE: CPT

## 2019-04-10 PROCEDURE — 85049 AUTOMATED PLATELET COUNT: CPT | Performed by: PHYSICIAN ASSISTANT

## 2019-04-10 RX ORDER — DEXTROSE AND SODIUM CHLORIDE 5; .45 G/100ML; G/100ML
75 INJECTION, SOLUTION INTRAVENOUS CONTINUOUS
Status: DISPENSED | OUTPATIENT
Start: 2019-04-10 | End: 2019-04-10

## 2019-04-10 RX ADMIN — ARIPIPRAZOLE 30 MG: 10 TABLET ORAL at 21:15

## 2019-04-10 RX ADMIN — DIVALPROEX SODIUM 1000 MG: 500 TABLET, DELAYED RELEASE ORAL at 08:28

## 2019-04-10 RX ADMIN — DEXTROSE AND SODIUM CHLORIDE 75 ML/HR: 5; .45 INJECTION, SOLUTION INTRAVENOUS at 16:30

## 2019-04-10 RX ADMIN — BACLOFEN 10 MG: 10 TABLET ORAL at 15:54

## 2019-04-10 RX ADMIN — FERROUS SULFATE TAB 325 MG (65 MG ELEMENTAL FE) 325 MG: 325 (65 FE) TAB at 15:54

## 2019-04-10 RX ADMIN — PANTOPRAZOLE SODIUM 40 MG: 40 TABLET, DELAYED RELEASE ORAL at 05:45

## 2019-04-10 RX ADMIN — OXYBUTYNIN CHLORIDE 5 MG: 5 TABLET ORAL at 21:15

## 2019-04-10 RX ADMIN — Medication 1 TABLET: at 08:27

## 2019-04-10 RX ADMIN — LEVOTHYROXINE SODIUM 25 MCG: 25 TABLET ORAL at 05:45

## 2019-04-10 RX ADMIN — BACLOFEN 10 MG: 10 TABLET ORAL at 21:15

## 2019-04-10 RX ADMIN — LORAZEPAM 0.5 MG: 0.5 TABLET ORAL at 17:45

## 2019-04-10 RX ADMIN — VITAMIN D, TAB 1000IU (100/BT) 2000 UNITS: 25 TAB at 08:27

## 2019-04-10 RX ADMIN — DEXTROSE AND SODIUM CHLORIDE 75 ML/HR: 5; .45 INJECTION, SOLUTION INTRAVENOUS at 10:54

## 2019-04-10 RX ADMIN — FERROUS SULFATE TAB 325 MG (65 MG ELEMENTAL FE) 325 MG: 325 (65 FE) TAB at 08:28

## 2019-04-10 RX ADMIN — PRAVASTATIN SODIUM 40 MG: 40 TABLET ORAL at 15:54

## 2019-04-10 RX ADMIN — BACLOFEN 10 MG: 10 TABLET ORAL at 08:27

## 2019-04-10 RX ADMIN — OXYBUTYNIN CHLORIDE 5 MG: 5 TABLET ORAL at 08:28

## 2019-04-10 RX ADMIN — OXYBUTYNIN CHLORIDE 5 MG: 5 TABLET ORAL at 15:54

## 2019-04-10 RX ADMIN — TRAZODONE HYDROCHLORIDE 100 MG: 100 TABLET ORAL at 21:14

## 2019-04-10 RX ADMIN — ENOXAPARIN SODIUM 40 MG: 40 INJECTION SUBCUTANEOUS at 08:28

## 2019-04-10 RX ADMIN — CITALOPRAM HYDROBROMIDE 40 MG: 20 TABLET ORAL at 08:27

## 2019-04-10 RX ADMIN — LORAZEPAM 0.5 MG: 0.5 TABLET ORAL at 08:16

## 2019-04-11 LAB
ANION GAP SERPL CALCULATED.3IONS-SCNC: 7 MMOL/L (ref 4–13)
ATRIAL RATE: 61 BPM
BUN SERPL-MCNC: 15 MG/DL (ref 5–25)
CALCIUM SERPL-MCNC: 8.8 MG/DL (ref 8.3–10.1)
CHLORIDE SERPL-SCNC: 111 MMOL/L (ref 100–108)
CO2 SERPL-SCNC: 31 MMOL/L (ref 21–32)
CREAT SERPL-MCNC: 1.01 MG/DL (ref 0.6–1.3)
GFR SERPL CREATININE-BSD FRML MDRD: 62 ML/MIN/1.73SQ M
GLUCOSE SERPL-MCNC: 141 MG/DL (ref 65–140)
GLUCOSE SERPL-MCNC: 173 MG/DL (ref 65–140)
GLUCOSE SERPL-MCNC: 180 MG/DL (ref 65–140)
GLUCOSE SERPL-MCNC: 186 MG/DL (ref 65–140)
GLUCOSE SERPL-MCNC: 473 MG/DL (ref 65–140)
P AXIS: 50 DEGREES
POTASSIUM SERPL-SCNC: 4 MMOL/L (ref 3.5–5.3)
PR INTERVAL: 134 MS
QRS AXIS: 69 DEGREES
QRSD INTERVAL: 76 MS
QT INTERVAL: 406 MS
QTC INTERVAL: 408 MS
SODIUM SERPL-SCNC: 149 MMOL/L (ref 136–145)
T WAVE AXIS: 68 DEGREES
VENTRICULAR RATE: 61 BPM

## 2019-04-11 PROCEDURE — 80048 BASIC METABOLIC PNL TOTAL CA: CPT | Performed by: PHYSICIAN ASSISTANT

## 2019-04-11 PROCEDURE — 93010 ELECTROCARDIOGRAM REPORT: CPT | Performed by: INTERNAL MEDICINE

## 2019-04-11 PROCEDURE — 82948 REAGENT STRIP/BLOOD GLUCOSE: CPT

## 2019-04-11 PROCEDURE — 99232 SBSQ HOSP IP/OBS MODERATE 35: CPT | Performed by: PHYSICIAN ASSISTANT

## 2019-04-11 PROCEDURE — 99222 1ST HOSP IP/OBS MODERATE 55: CPT | Performed by: INTERNAL MEDICINE

## 2019-04-11 RX ORDER — DEXTROSE AND SODIUM CHLORIDE 5; .45 G/100ML; G/100ML
75 INJECTION, SOLUTION INTRAVENOUS CONTINUOUS
Status: DISCONTINUED | OUTPATIENT
Start: 2019-04-11 | End: 2019-04-11

## 2019-04-11 RX ADMIN — DEXTROSE AND SODIUM CHLORIDE 75 ML/HR: 5; .45 INJECTION, SOLUTION INTRAVENOUS at 10:13

## 2019-04-11 RX ADMIN — LEVOTHYROXINE SODIUM 25 MCG: 25 TABLET ORAL at 05:56

## 2019-04-11 RX ADMIN — LORAZEPAM 0.5 MG: 0.5 TABLET ORAL at 10:13

## 2019-04-11 RX ADMIN — OXYBUTYNIN CHLORIDE 5 MG: 5 TABLET ORAL at 20:36

## 2019-04-11 RX ADMIN — BACLOFEN 10 MG: 10 TABLET ORAL at 15:32

## 2019-04-11 RX ADMIN — LORAZEPAM 0.5 MG: 0.5 TABLET ORAL at 17:06

## 2019-04-11 RX ADMIN — SODIUM CHLORIDE: 234 INJECTION INTRAMUSCULAR; INTRAVENOUS; SUBCUTANEOUS at 13:26

## 2019-04-11 RX ADMIN — BACLOFEN 10 MG: 10 TABLET ORAL at 10:14

## 2019-04-11 RX ADMIN — BACLOFEN 10 MG: 10 TABLET ORAL at 20:36

## 2019-04-11 RX ADMIN — FERROUS SULFATE TAB 325 MG (65 MG ELEMENTAL FE) 325 MG: 325 (65 FE) TAB at 15:32

## 2019-04-11 RX ADMIN — SODIUM CHLORIDE: 234 INJECTION INTRAMUSCULAR; INTRAVENOUS; SUBCUTANEOUS at 20:42

## 2019-04-11 RX ADMIN — PANTOPRAZOLE SODIUM 40 MG: 40 TABLET, DELAYED RELEASE ORAL at 05:56

## 2019-04-11 RX ADMIN — PRAVASTATIN SODIUM 40 MG: 40 TABLET ORAL at 15:32

## 2019-04-11 RX ADMIN — INSULIN HUMAN 8 UNITS: 100 INJECTION, SOLUTION PARENTERAL at 13:26

## 2019-04-11 RX ADMIN — TRAZODONE HYDROCHLORIDE 100 MG: 100 TABLET ORAL at 20:36

## 2019-04-11 RX ADMIN — CITALOPRAM HYDROBROMIDE 40 MG: 20 TABLET ORAL at 10:14

## 2019-04-11 RX ADMIN — OXYBUTYNIN CHLORIDE 5 MG: 5 TABLET ORAL at 15:32

## 2019-04-11 RX ADMIN — ARIPIPRAZOLE 30 MG: 10 TABLET ORAL at 20:36

## 2019-04-12 VITALS
HEART RATE: 61 BPM | SYSTOLIC BLOOD PRESSURE: 104 MMHG | OXYGEN SATURATION: 95 % | DIASTOLIC BLOOD PRESSURE: 64 MMHG | RESPIRATION RATE: 18 BRPM | BODY MASS INDEX: 20.99 KG/M2 | TEMPERATURE: 98.4 F | HEIGHT: 58 IN | WEIGHT: 100 LBS

## 2019-04-12 LAB
ANION GAP SERPL CALCULATED.3IONS-SCNC: 8 MMOL/L (ref 4–13)
BUN SERPL-MCNC: 20 MG/DL (ref 5–25)
CALCIUM SERPL-MCNC: 8.8 MG/DL (ref 8.3–10.1)
CHLORIDE SERPL-SCNC: 109 MMOL/L (ref 100–108)
CO2 SERPL-SCNC: 27 MMOL/L (ref 21–32)
CREAT SERPL-MCNC: 0.91 MG/DL (ref 0.6–1.3)
GFR SERPL CREATININE-BSD FRML MDRD: 71 ML/MIN/1.73SQ M
GLUCOSE SERPL-MCNC: 195 MG/DL (ref 65–140)
GLUCOSE SERPL-MCNC: 230 MG/DL (ref 65–140)
GLUCOSE SERPL-MCNC: 231 MG/DL (ref 65–140)
POTASSIUM SERPL-SCNC: 4 MMOL/L (ref 3.5–5.3)
SODIUM SERPL-SCNC: 144 MMOL/L (ref 136–145)

## 2019-04-12 PROCEDURE — 80048 BASIC METABOLIC PNL TOTAL CA: CPT | Performed by: PHYSICIAN ASSISTANT

## 2019-04-12 PROCEDURE — 82948 REAGENT STRIP/BLOOD GLUCOSE: CPT

## 2019-04-12 PROCEDURE — 99232 SBSQ HOSP IP/OBS MODERATE 35: CPT | Performed by: INTERNAL MEDICINE

## 2019-04-12 PROCEDURE — 99239 HOSP IP/OBS DSCHRG MGMT >30: CPT | Performed by: PHYSICIAN ASSISTANT

## 2019-04-12 RX ADMIN — LEVOTHYROXINE SODIUM 25 MCG: 25 TABLET ORAL at 05:07

## 2019-04-12 RX ADMIN — PANTOPRAZOLE SODIUM 40 MG: 40 TABLET, DELAYED RELEASE ORAL at 05:07

## 2019-04-12 RX ADMIN — LORAZEPAM 0.5 MG: 0.5 TABLET ORAL at 09:05

## 2019-04-12 RX ADMIN — DIVALPROEX SODIUM 1000 MG: 500 TABLET, DELAYED RELEASE ORAL at 09:05

## 2019-04-12 RX ADMIN — VITAMIN D, TAB 1000IU (100/BT) 2000 UNITS: 25 TAB at 09:05

## 2019-04-12 RX ADMIN — BACLOFEN 10 MG: 10 TABLET ORAL at 09:05

## 2019-04-12 RX ADMIN — Medication 1 TABLET: at 09:05

## 2019-04-12 RX ADMIN — CITALOPRAM HYDROBROMIDE 40 MG: 20 TABLET ORAL at 09:05

## 2019-04-12 RX ADMIN — FERROUS SULFATE TAB 325 MG (65 MG ELEMENTAL FE) 325 MG: 325 (65 FE) TAB at 09:05

## 2019-04-12 RX ADMIN — OXYBUTYNIN CHLORIDE 5 MG: 5 TABLET ORAL at 09:05

## 2019-04-13 LAB
BACTERIA BLD CULT: NORMAL
BACTERIA BLD CULT: NORMAL

## 2019-04-15 ENCOUNTER — TRANSITIONAL CARE MANAGEMENT (OUTPATIENT)
Dept: FAMILY MEDICINE CLINIC | Facility: CLINIC | Age: 59
End: 2019-04-15

## 2019-04-16 ENCOUNTER — OFFICE VISIT (OUTPATIENT)
Dept: FAMILY MEDICINE CLINIC | Facility: CLINIC | Age: 59
End: 2019-04-16

## 2019-04-16 VITALS
HEART RATE: 68 BPM | SYSTOLIC BLOOD PRESSURE: 110 MMHG | WEIGHT: 99.8 LBS | RESPIRATION RATE: 16 BRPM | DIASTOLIC BLOOD PRESSURE: 68 MMHG | BODY MASS INDEX: 20.86 KG/M2 | TEMPERATURE: 97.5 F

## 2019-04-16 DIAGNOSIS — Z09 ENCOUNTER FOR EXAMINATION FOLLOWING TREATMENT AT HOSPITAL: Primary | ICD-10-CM

## 2019-04-16 DIAGNOSIS — R41.82 ALTERED MENTAL STATUS, UNSPECIFIED ALTERED MENTAL STATUS TYPE: ICD-10-CM

## 2019-04-16 PROCEDURE — 99213 OFFICE O/P EST LOW 20 MIN: CPT | Performed by: FAMILY MEDICINE

## 2019-04-16 PROCEDURE — 1111F DSCHRG MED/CURRENT MED MERGE: CPT | Performed by: FAMILY MEDICINE

## 2019-04-16 RX ORDER — CARBOXYMETHYLCELLULOSE SODIUM 5 MG/ML
1 SOLUTION/ DROPS OPHTHALMIC 3 TIMES DAILY PRN
COMMUNITY

## 2019-04-16 RX ORDER — BACITRACIN, NEOMYCIN, POLYMYXIN B 400; 3.5; 5 [USP'U]/G; MG/G; [USP'U]/G
1 OINTMENT TOPICAL 2 TIMES DAILY PRN
COMMUNITY

## 2019-04-16 RX ORDER — PETROLATUM,WHITE/LANOLIN
OINTMENT (GRAM) TOPICAL AS NEEDED
COMMUNITY

## 2019-05-07 DIAGNOSIS — Z12.31 SCREENING MAMMOGRAM, ENCOUNTER FOR: Primary | ICD-10-CM

## 2019-05-21 ENCOUNTER — TELEPHONE (OUTPATIENT)
Dept: FAMILY MEDICINE CLINIC | Facility: CLINIC | Age: 59
End: 2019-05-21

## 2019-05-21 DIAGNOSIS — G80.0 SPASTIC QUADRIPLEGIC CEREBRAL PALSY (HCC): ICD-10-CM

## 2019-05-21 DIAGNOSIS — R25.2 SPASTICITY: Primary | ICD-10-CM

## 2019-05-21 DIAGNOSIS — M81.0 OSTEOPOROSIS, UNSPECIFIED OSTEOPOROSIS TYPE, UNSPECIFIED PATHOLOGICAL FRACTURE PRESENCE: ICD-10-CM

## 2019-05-22 ENCOUNTER — TELEPHONE (OUTPATIENT)
Dept: FAMILY MEDICINE CLINIC | Facility: CLINIC | Age: 59
End: 2019-05-22

## 2019-05-28 ENCOUNTER — HOSPITAL ENCOUNTER (OUTPATIENT)
Dept: RADIOLOGY | Age: 59
Discharge: HOME | End: 2019-05-28
Payer: COMMERCIAL

## 2019-05-28 VITALS — WEIGHT: 110 LBS | HEIGHT: 55 IN | BODY MASS INDEX: 25.46 KG/M2

## 2019-05-28 DIAGNOSIS — Z12.31 SCREENING MAMMOGRAM, ENCOUNTER FOR: ICD-10-CM

## 2019-05-28 PROCEDURE — 77067 SCR MAMMO BI INCL CAD: CPT

## 2019-05-30 ENCOUNTER — OFFICE VISIT (OUTPATIENT)
Dept: NEUROLOGY | Facility: CLINIC | Age: 59
End: 2019-05-30
Payer: COMMERCIAL

## 2019-05-30 VITALS
WEIGHT: 97.1 LBS | BODY MASS INDEX: 22.47 KG/M2 | HEART RATE: 121 BPM | DIASTOLIC BLOOD PRESSURE: 72 MMHG | HEIGHT: 55 IN | SYSTOLIC BLOOD PRESSURE: 110 MMHG

## 2019-05-30 DIAGNOSIS — G80.0 SPASTIC QUADRIPLEGIC CEREBRAL PALSY (HCC): Primary | ICD-10-CM

## 2019-05-30 DIAGNOSIS — I69.398 SPASTICITY AS LATE EFFECT OF CEREBROVASCULAR ACCIDENT (CVA): ICD-10-CM

## 2019-05-30 DIAGNOSIS — R25.2 SPASTICITY AS LATE EFFECT OF CEREBROVASCULAR ACCIDENT (CVA): ICD-10-CM

## 2019-05-30 PROCEDURE — 99214 OFFICE O/P EST MOD 30 MIN: CPT | Performed by: PHYSICAL MEDICINE & REHABILITATION

## 2019-05-30 RX ORDER — BACLOFEN 10 MG/1
10 TABLET ORAL 3 TIMES DAILY
Qty: 90 TABLET | Refills: 6 | Status: SHIPPED | OUTPATIENT
Start: 2019-05-30 | End: 2019-11-12 | Stop reason: SDUPTHER

## 2019-06-04 ENCOUNTER — CLINICAL SUPPORT (OUTPATIENT)
Dept: FAMILY MEDICINE CLINIC | Facility: CLINIC | Age: 59
End: 2019-06-04

## 2019-06-04 ENCOUNTER — HOSPITAL ENCOUNTER (OUTPATIENT)
Dept: ULTRASOUND IMAGING | Facility: CLINIC | Age: 59
Discharge: HOME | End: 2019-06-04
Payer: COMMERCIAL

## 2019-06-04 ENCOUNTER — TRANSCRIBE ORDERS (OUTPATIENT)
Dept: ADMINISTRATIVE | Facility: HOSPITAL | Age: 59
End: 2019-06-04

## 2019-06-04 ENCOUNTER — HOSPITAL ENCOUNTER (OUTPATIENT)
Dept: MAMMOGRAPHY | Facility: CLINIC | Age: 59
Discharge: HOME | End: 2019-06-04
Payer: COMMERCIAL

## 2019-06-04 VITALS — HEIGHT: 55 IN | WEIGHT: 97 LBS | BODY MASS INDEX: 22.45 KG/M2

## 2019-06-04 DIAGNOSIS — R92.2 INCONCLUSIVE MAMMOGRAPHY: ICD-10-CM

## 2019-06-04 DIAGNOSIS — M81.0 OSTEOPOROSIS, UNSPECIFIED OSTEOPOROSIS TYPE, UNSPECIFIED PATHOLOGICAL FRACTURE PRESENCE: Primary | ICD-10-CM

## 2019-06-04 DIAGNOSIS — R92.8 ABNORMAL MAMMOGRAM: ICD-10-CM

## 2019-06-04 DIAGNOSIS — N63.10 BREAST MASS, RIGHT: ICD-10-CM

## 2019-06-04 PROCEDURE — 77065 DX MAMMO INCL CAD UNI: CPT

## 2019-06-04 PROCEDURE — 96372 THER/PROPH/DIAG INJ SC/IM: CPT

## 2019-06-04 PROCEDURE — 76642 ULTRASOUND BREAST LIMITED: CPT

## 2019-06-04 PROCEDURE — G0279 TOMOSYNTHESIS, MAMMO: HCPCS

## 2019-06-13 ENCOUNTER — TELEPHONE (OUTPATIENT)
Dept: FAMILY MEDICINE CLINIC | Facility: CLINIC | Age: 59
End: 2019-06-13

## 2019-06-21 ENCOUNTER — APPOINTMENT (OUTPATIENT)
Dept: LAB | Facility: MEDICAL CENTER | Age: 59
End: 2019-06-21
Payer: COMMERCIAL

## 2019-06-21 DIAGNOSIS — M81.0 OSTEOPOROSIS, UNSPECIFIED OSTEOPOROSIS TYPE, UNSPECIFIED PATHOLOGICAL FRACTURE PRESENCE: ICD-10-CM

## 2019-06-21 DIAGNOSIS — E87.2 NORMAL ANION GAP METABOLIC ACIDOSIS: Primary | ICD-10-CM

## 2019-06-21 LAB
ANION GAP SERPL CALCULATED.3IONS-SCNC: 2 MMOL/L (ref 4–13)
BUN SERPL-MCNC: 16 MG/DL (ref 5–25)
CALCIUM SERPL-MCNC: 9.5 MG/DL (ref 8.3–10.1)
CHLORIDE SERPL-SCNC: 103 MMOL/L (ref 100–108)
CO2 SERPL-SCNC: 38 MMOL/L (ref 21–32)
CREAT SERPL-MCNC: 0.79 MG/DL (ref 0.6–1.3)
GFR SERPL CREATININE-BSD FRML MDRD: 83 ML/MIN/1.73SQ M
GLUCOSE P FAST SERPL-MCNC: 88 MG/DL (ref 65–99)
POTASSIUM SERPL-SCNC: 4 MMOL/L (ref 3.5–5.3)
SODIUM SERPL-SCNC: 143 MMOL/L (ref 136–145)

## 2019-06-21 PROCEDURE — 36415 COLL VENOUS BLD VENIPUNCTURE: CPT

## 2019-06-21 PROCEDURE — 80048 BASIC METABOLIC PNL TOTAL CA: CPT

## 2019-06-24 ENCOUNTER — APPOINTMENT (OUTPATIENT)
Dept: LAB | Facility: HOSPITAL | Age: 59
End: 2019-06-24
Payer: COMMERCIAL

## 2019-06-24 DIAGNOSIS — E87.2 NORMAL ANION GAP METABOLIC ACIDOSIS: ICD-10-CM

## 2019-06-24 LAB
BASE EX.OXY STD BLDV CALC-SCNC: 36.4 % (ref 60–80)
BASE EXCESS BLDV CALC-SCNC: 3.9 MMOL/L
HCO3 BLDV-SCNC: 30 MMOL/L (ref 24–30)
O2 CT BLDV-SCNC: 6.4 ML/DL
PCO2 BLDV: 51.8 MM HG (ref 42–50)
PH BLDV: 7.38 [PH] (ref 7.3–7.4)
PO2 BLDV: 22.3 MM HG (ref 35–45)

## 2019-06-24 PROCEDURE — 36415 COLL VENOUS BLD VENIPUNCTURE: CPT

## 2019-06-24 PROCEDURE — 82805 BLOOD GASES W/O2 SATURATION: CPT

## 2019-06-25 ENCOUNTER — TELEPHONE (OUTPATIENT)
Dept: FAMILY MEDICINE CLINIC | Facility: CLINIC | Age: 59
End: 2019-06-25

## 2019-06-27 ENCOUNTER — TELEPHONE (OUTPATIENT)
Dept: NEUROLOGY | Facility: CLINIC | Age: 59
End: 2019-06-27

## 2019-06-28 DIAGNOSIS — M21.372 FOOT DROP, BILATERAL: Primary | ICD-10-CM

## 2019-06-28 DIAGNOSIS — G80.8 OTHER CEREBRAL PALSY (HCC): ICD-10-CM

## 2019-06-28 DIAGNOSIS — M21.371 FOOT DROP, BILATERAL: Primary | ICD-10-CM

## 2019-07-05 ENCOUNTER — TRANSCRIBE ORDERS (OUTPATIENT)
Dept: ADMINISTRATIVE | Facility: HOSPITAL | Age: 59
End: 2019-07-05

## 2019-07-05 DIAGNOSIS — N64.89 HEMATOMA OF BREAST: Primary | ICD-10-CM

## 2019-07-09 ENCOUNTER — OFFICE VISIT (OUTPATIENT)
Dept: FAMILY MEDICINE CLINIC | Facility: CLINIC | Age: 59
End: 2019-07-09

## 2019-07-09 VITALS
HEART RATE: 84 BPM | DIASTOLIC BLOOD PRESSURE: 70 MMHG | HEIGHT: 55 IN | WEIGHT: 96.8 LBS | BODY MASS INDEX: 22.4 KG/M2 | SYSTOLIC BLOOD PRESSURE: 112 MMHG | RESPIRATION RATE: 18 BRPM | TEMPERATURE: 98.4 F

## 2019-07-09 DIAGNOSIS — I10 ESSENTIAL HYPERTENSION: Chronic | ICD-10-CM

## 2019-07-09 DIAGNOSIS — F31.9 BIPOLAR AFFECTIVE DISORDER, REMISSION STATUS UNSPECIFIED (HCC): ICD-10-CM

## 2019-07-09 DIAGNOSIS — N64.89 BREAST ASYMMETRY: Primary | ICD-10-CM

## 2019-07-09 DIAGNOSIS — G80.0 SPASTIC QUADRIPLEGIC CEREBRAL PALSY (HCC): ICD-10-CM

## 2019-07-09 DIAGNOSIS — E11.9 TYPE 2 DIABETES MELLITUS WITHOUT COMPLICATION, WITHOUT LONG-TERM CURRENT USE OF INSULIN (HCC): ICD-10-CM

## 2019-07-09 PROBLEM — J18.9 PNEUMONIA DUE TO INFECTIOUS ORGANISM: Status: RESOLVED | Noted: 2018-12-28 | Resolved: 2019-07-09

## 2019-07-09 PROCEDURE — 3074F SYST BP LT 130 MM HG: CPT | Performed by: FAMILY MEDICINE

## 2019-07-09 PROCEDURE — 3078F DIAST BP <80 MM HG: CPT | Performed by: FAMILY MEDICINE

## 2019-07-09 PROCEDURE — 99213 OFFICE O/P EST LOW 20 MIN: CPT | Performed by: FAMILY MEDICINE

## 2019-07-09 PROCEDURE — G0439 PPPS, SUBSEQ VISIT: HCPCS | Performed by: FAMILY MEDICINE

## 2019-07-09 NOTE — PROGRESS NOTES
Last Medicare Wellness visit information reviewed, patient interviewed, no change since last AWV  Health Risk Assessment:  Patient rates overall health as good  Patient feels that their physical health rating is Same  Eyesight was rated as Same  Hearing was rated as Same  Patient feels that their emotional and mental health rating is Same  Pain experienced by patient in the last 7 days has been None  Emotional/Mental Health:  Patient has not been feeling nervous/anxious  PHQ-9 Depression Screening:    Frequency of the following problems over the past two weeks:      1  Little interest or pleasure in doing things: 0 - not at all      2  Feeling down, depressed, or hopeless: 0 - not at all  PHQ-2 Score: 0          Broken Bones/Falls: Fall Risk Assessment:    In the past year, patient has experienced: History of falling in past year    Number of falls: 2 or more    Injured during fall: No          Bladder/Bowel:  Patient has leaked urine accidently in the last six months  Patient reports loss of bowel control  Immunizations:  Patient has had a flu vaccination within the last year  Patient has received a pneumonia shot  Patient has not received a shingles shot  Patient has received tetanus/diphtheria shot  Date of tetanus/diphtheria shot: 6/6/2018    Home Safety:  Patient does not have trouble with stairs inside or outside of their home  Patient currently reports that there are no safety hazards present in home, working smoke alarms, working carbon monoxide detectors  Preventative Screenings:   Breast cancer screening performed, 5/28/2019  colon cancer screen completed, 7/25/2017  cholesterol screen completed, 3/17/2019  glaucoma eye exam completed, 7/1/2019      Nutrition:  Current diet: Diabetic and No Added Salt with servings of the following:    Medications:  Patient is currently taking over-the-counter supplements  Patient is not able to manage medications      Lifestyle Choices:  Patient reports no tobacco use  Patient has smoked or used tobacco in the past   Patient has stopped her tobacco use  Patient reports no alcohol use  Patient does not drive a vehicle  Patient wears seat belt  Activities of Daily Living:  Unable to get out of bed by his or her self, unable to dress self, unable to make own meals, unable to do own shopping, unable to bathe self, unable to do laundry/housekeeping, unable to manage own money and other related tasks    Previous Hospitalizations:  Hospitalization or ED visit in past 12 months  Number of hospitalizations within the last year: 1-2        Advanced Directives:  Patient has decided on a power of   Patient has spoken to designated power of   Patient has not completed advanced directive  Social Support: Marita Mckeon (charge nurse [facility])  [facility] [address]    Preventative Screening/Counseling:      Cardiovascular:      General: Screening Current      Counseling: Healthy Diet and Healthy Weight          Diabetes:      General: Screening Current      Counseling: Healthy Diet      Comments: Repeat A1c in September   Last one was 5 7 in March         Colorectal Cancer:      General: Screening Current      Comments: Colonoscopy was 7/25/2017   Will return to GI in 2020 and next colonoscopy due in 2020         Breast Cancer:      General: Screening Current      Comments: Ordering follow up diagnostic U/S due to asymtery which was later said to be hematoma   Also has a 2x2 cm cyst that needs to be followed         Cervical Cancer:      General: Screening Current      Comments: Last pap smear was 10/19/2017        Osteoporosis:      General: Screening Current      Comments: Receives Prolia         Glaucoma:      General: Screening Current      Comments: Last eye exam 7/1/2019         HIV:      General: Screening Not Indicated          Hepatitis C:      General: Screening Current        Advanced Directives:   Patient has living will for healthcare, has durable POA for healthcare, Information on ACP and/or AD provided

## 2019-07-09 NOTE — ASSESSMENT & PLAN NOTE
Follows with Dr Anastasiia Alejandre at [facility] for management of psych issues   Last seen 6/11/19, no mediation changes were made  On Depakote 100 mg daily   Check CPK and LFT  Lipid panel was normal in March

## 2019-07-09 NOTE — ASSESSMENT & PLAN NOTE
Well controlled at 112/70  Goal less than 140/90 given history of DM   Continue to monitor off anti-hypertensive medications

## 2019-07-09 NOTE — ASSESSMENT & PLAN NOTE
Noted on screening mammogram 5/28/19 will required diagnostic and showed evidence of hematoma as well as a 2x 2 cm cyst  Will need follow up with diagnostic U/S

## 2019-07-09 NOTE — PROGRESS NOTES
Assessment/Plan:     Problem List Items Addressed This Visit        Endocrine    Type 2 diabetes mellitus, without long-term current use of insulin ([facility] )     Lab Results   Component Value Date    HGBA1C 5 7 03/07/2019     Well controlled on Metformin 1000 mg BID  Offered to decrease to 1000 mg given well controlled FBS  Caretaker prefers to continue current dose, which im ok with  Given a1c will defer repeat Hba1c to 6 months rather than 3 months  Labs slip given  Diabetic eye exam completed 7/1, and there were no signs of retinopathy  Did appreciate trace cataracts  Foot exam performed today as well  No signs of callus or wounds  Caretaker does a fine job lotioning her foot twice a day  Lipid panel in March was within desirable range  Microalbumin 9/2018 was normal  Will recheck in September  Cardiovascular and Mediastinum    Essential hypertension (Chronic)     Well controlled at 112/70  Goal less than 140/90 given history of DM   Continue to monitor off anti-hypertensive medications               Nervous and Auditory    Cerebral palsy ([facility] )     Follows with PMR  Last visit 5/2019  Advised her to continue Baclofen 10 mg TID            Other    Bipolar disorder ([facility] )     Follows with Dr Lindsey Sherman at [facility] for management of psych issues   Last seen 6/11/19, no mediation changes were made  On Depakote 100 mg daily   Check CPK and LFT  Lipid panel was normal in March  Breast asymmetry - Primary     Noted on screening mammogram 5/28/19 will required diagnostic and showed evidence of hematoma as well as a 2x 2 cm cyst  Will need follow up with diagnostic U/S                 Subjective:      Patient ID: Carlyon Harada is a 64 y o  female  Here for MW visit  This is a subsequent visit  Here with Marian Nur, caretaker  Lost her balance last week and bruised nasal nose and right cheek  Not on any blood thinner and was not evaluated in the ER  Has not notes in changes in behavior       Since last visit:   - Saw PMR for spacticity  Said to continue Baclofen 10 mg TID  Was also order 2 new AFO's and will return next week  -  Dietician recommended increased protein intake  Currently on 1500 calorie, regular diet  If weight doesn't improve, may switch to regular diet without calorie restriction   - Went to breast center for diagnostic mammogram and ultrasound  Found hematoma and 2 2 cm cyst in right breast  Left was normal  Requesting an order for diagnostic breast ultrasound to follow up the previous findings  - Saw psychiatry  No changes made to current medications  - Went to ophthalmologist 7/1  No diabetic retinopathy  Trace cataracts and lens changes  FBS's have ranged between   Last A1c 5 7 in March  - Co2 was 38 on 6/21 post Prolia and VBG was ordered and measured a PCo2 of 51 8, Hgb o2 was 3 6 4  Not shallow breathing or more fatigued than usual          The following portions of the patient's history were reviewed and updated as appropriate: allergies, current medications, past family history, past medical history, past social history, past surgical history and problem list     Review of Systems      Objective:      /70   Pulse 84   Temp 98 4 °F (36 9 °C)   Resp 18   Ht 4' 5" (1 346 m)   Wt 43 9 kg (96 lb 12 8 oz)   LMP 11/29/2009 (Exact Date)   BMI 24 23 kg/m²          Physical Exam   Constitutional: She appears well-developed and well-nourished  No distress  HENT:   Head: Normocephalic and atraumatic  Eyes: EOM are normal  Right eye exhibits no discharge  Left eye exhibits no discharge  Cardiovascular: Normal rate, regular rhythm and normal heart sounds  Exam reveals no friction rub  No murmur heard  Pulmonary/Chest: Effort normal and breath sounds normal  No respiratory distress  She has no wheezes  She has no rales  Abdominal: Soft  She exhibits no distension  There is no tenderness  There is no guarding  Musculoskeletal: Normal range of motion   She exhibits no edema  Lymphadenopathy:     She has no cervical adenopathy  Skin: Skin is warm  Capillary refill takes less than 2 seconds  Vitals reviewed

## 2019-07-09 NOTE — ASSESSMENT & PLAN NOTE
Lab Results   Component Value Date    HGBA1C 5 7 03/07/2019     Well controlled on Metformin 1000 mg BID  Offered to decrease to 1000 mg given well controlled FBS  Caretaker prefers to continue current dose which im ok with  Given a1c will defer repeat Hba1c to 6 months rather than 3 months  Labs slip given   Diabetic eye exam completed 7/1 and no signs of retinopathy  Did appreciate trace cataracts  Foot exam performed today as well  No signs of callus or wounds  Caretaker does a fine job lotioning her foot twice a day  Lipid panel in March was within desirable range  Microalbumin 9/2018 was normal  Will recheck in September

## 2019-09-06 ENCOUNTER — HOSPITAL ENCOUNTER (OUTPATIENT)
Dept: ULTRASOUND IMAGING | Facility: CLINIC | Age: 59
Discharge: HOME | End: 2019-09-06
Payer: COMMERCIAL

## 2019-09-06 VITALS — HEIGHT: 55 IN | WEIGHT: 95 LBS | BODY MASS INDEX: 21.98 KG/M2

## 2019-09-06 DIAGNOSIS — N64.89 BREAST ASYMMETRY: ICD-10-CM

## 2019-09-06 PROCEDURE — 76642 ULTRASOUND BREAST LIMITED: CPT

## 2019-09-10 ENCOUNTER — APPOINTMENT (OUTPATIENT)
Dept: LAB | Facility: CLINIC | Age: 59
End: 2019-09-10
Payer: COMMERCIAL

## 2019-09-10 DIAGNOSIS — F31.9 BIPOLAR AFFECTIVE DISORDER, REMISSION STATUS UNSPECIFIED (HCC): ICD-10-CM

## 2019-09-10 DIAGNOSIS — E11.9 TYPE 2 DIABETES MELLITUS WITHOUT COMPLICATION, WITHOUT LONG-TERM CURRENT USE OF INSULIN (HCC): ICD-10-CM

## 2019-09-10 LAB
ALBUMIN SERPL BCP-MCNC: 3.5 G/DL (ref 3.5–5)
ALP SERPL-CCNC: 45 U/L (ref 46–116)
ALT SERPL W P-5'-P-CCNC: 15 U/L (ref 12–78)
ANION GAP SERPL CALCULATED.3IONS-SCNC: 5 MMOL/L (ref 4–13)
AST SERPL W P-5'-P-CCNC: 15 U/L (ref 5–45)
BILIRUB DIRECT SERPL-MCNC: 0.07 MG/DL (ref 0–0.2)
BILIRUB SERPL-MCNC: 0.27 MG/DL (ref 0.2–1)
BUN SERPL-MCNC: 19 MG/DL (ref 5–25)
CALCIUM SERPL-MCNC: 10 MG/DL (ref 8.3–10.1)
CHLORIDE SERPL-SCNC: 102 MMOL/L (ref 100–108)
CK SERPL-CCNC: 90 U/L (ref 26–192)
CO2 SERPL-SCNC: 34 MMOL/L (ref 21–32)
CREAT SERPL-MCNC: 0.99 MG/DL (ref 0.6–1.3)
CREAT UR-MCNC: 23.8 MG/DL
EST. AVERAGE GLUCOSE BLD GHB EST-MCNC: 111 MG/DL
GFR SERPL CREATININE-BSD FRML MDRD: 63 ML/MIN/1.73SQ M
GLUCOSE P FAST SERPL-MCNC: 83 MG/DL (ref 65–99)
HBA1C MFR BLD: 5.5 % (ref 4.2–6.3)
MICROALBUMIN UR-MCNC: <5 MG/L (ref 0–20)
MICROALBUMIN/CREAT 24H UR: <21 MG/G CREATININE (ref 0–30)
POTASSIUM SERPL-SCNC: 4.3 MMOL/L (ref 3.5–5.3)
PROT SERPL-MCNC: 7.2 G/DL (ref 6.4–8.2)
SODIUM SERPL-SCNC: 141 MMOL/L (ref 136–145)

## 2019-09-10 PROCEDURE — 82248 BILIRUBIN DIRECT: CPT

## 2019-09-10 PROCEDURE — 83036 HEMOGLOBIN GLYCOSYLATED A1C: CPT

## 2019-09-10 PROCEDURE — 82043 UR ALBUMIN QUANTITATIVE: CPT | Performed by: FAMILY MEDICINE

## 2019-09-10 PROCEDURE — 80053 COMPREHEN METABOLIC PANEL: CPT

## 2019-09-10 PROCEDURE — 36415 COLL VENOUS BLD VENIPUNCTURE: CPT

## 2019-09-10 PROCEDURE — 82570 ASSAY OF URINE CREATININE: CPT | Performed by: FAMILY MEDICINE

## 2019-09-10 PROCEDURE — 82550 ASSAY OF CK (CPK): CPT

## 2019-09-26 ENCOUNTER — OFFICE VISIT (OUTPATIENT)
Dept: FAMILY MEDICINE CLINIC | Facility: CLINIC | Age: 59
End: 2019-09-26

## 2019-09-26 VITALS
TEMPERATURE: 96.3 F | SYSTOLIC BLOOD PRESSURE: 100 MMHG | WEIGHT: 99.8 LBS | DIASTOLIC BLOOD PRESSURE: 70 MMHG | RESPIRATION RATE: 16 BRPM | BODY MASS INDEX: 23.1 KG/M2 | HEIGHT: 55 IN | HEART RATE: 72 BPM

## 2019-09-26 DIAGNOSIS — E11.9 TYPE 2 DIABETES MELLITUS WITHOUT COMPLICATION, WITHOUT LONG-TERM CURRENT USE OF INSULIN (HCC): Primary | ICD-10-CM

## 2019-09-26 DIAGNOSIS — M81.0 OSTEOPOROSIS, UNSPECIFIED OSTEOPOROSIS TYPE, UNSPECIFIED PATHOLOGICAL FRACTURE PRESENCE: ICD-10-CM

## 2019-09-26 DIAGNOSIS — G80.0 SPASTIC QUADRIPLEGIC CEREBRAL PALSY (HCC): ICD-10-CM

## 2019-09-26 PROBLEM — R14.0 ABDOMINAL BLOATING: Status: ACTIVE | Noted: 2019-09-26

## 2019-09-26 PROCEDURE — 99213 OFFICE O/P EST LOW 20 MIN: CPT | Performed by: FAMILY MEDICINE

## 2019-09-26 PROCEDURE — 3008F BODY MASS INDEX DOCD: CPT | Performed by: FAMILY MEDICINE

## 2019-09-26 NOTE — PROGRESS NOTES
Assessment/Plan:    Type 2 Diabetes Mellitus  - The patient's most recent HbA1C was 5 5 on 9/10/19; Microalbumin: <5 0; Microalbumin/creatinine ratio: <21; CK: 90  - Based on patient's recent labs and stable condition, decreased Metformin to 1000mg daily; will recheck HbA1C in 3 months and follow up with patient    Osteoporosis  - last DEXA scan performed 1/2018 measured a T-score -2 9 in the right femoral neck, lumbar spine -0 8, and - 1 6 in the right total hip  - patient currently takes Prolia received last year  - due for another dose in November  - will obtain BMP now and then 2 weeks after administration of the Prolia  - script provided to caregiver  - continue vitamin-D 1000 international units daily    Bloating and soft stools concerning for lactose intolerance:   - there are no clear blood testing available for lactose intolerance  This is diagnosed clinically or with biopsy which is not warranted at this time  - avoid processed lactose for 1 week and reports if there are any changes  CP with spasticity:   Order Home PT      Problem List Items Addressed This Visit        Endocrine    Type 2 diabetes mellitus, without long-term current use of insulin ([address] ) - Primary    Relevant Medications    metFORMIN (GLUCOPHAGE) 1000 MG tablet       Nervous and Auditory    Cerebral palsy ([address] )    Relevant Orders    Ambulatory referral to Physical Therapy       Musculoskeletal and Integument    Osteoporosis    Relevant Medications    denosumab (PROLIA) 60 mg/mL    Other Relevant Orders    Basic metabolic panel    Basic metabolic panel            Subjective:      Patient ID: Venice Rodriguez is a 64 y o  female  HPI   The patient presents today for follow up for her diabetes  She is currently taking 1000mg of Metformin BID  The patient's fasting glucose levels range from 100-141   Per Kandace Turner, the patient's nurse, she has not had any episodes of hypo/hyperglycemia and the patient has been compliant with her medications  There is concern about the patient's bowel moments  She has occasional loose stools per nursing staff, sometimes daily but rarely multiple loose stools in a day  The color of her stool has not changed  The patient has gas as well  They would like for the patient to be checked for possible lactose intolerance  Since the patient was last seen in 7/2019, the following events have occurred:  - The patient has received two new orthotics for both lower legs and would like to have a PT referral to assess how the patient is tolerating them  - Her hand brace was adjusted  - Seen by Podiatry twice on 7/17/19 and 9/25/19 for routine nail care and diabetic foot care  - The patient has been seen by her dietician, psychiatrist and counselor as well  The following portions of the patient's history were reviewed and updated as appropriate: allergies, current medications, past family history, past medical history, past social history, past surgical history and problem list     Review of Systems   Constitutional: Negative for fever  Respiratory: Negative for cough and shortness of breath  Cardiovascular: Negative for chest pain  Gastrointestinal: Negative for abdominal pain, constipation, diarrhea, nausea and vomiting  Has gas and loose stools  Genitourinary:        Has urinary incontinence  Objective:      /70 (BP Location: Left arm, Patient Position: Sitting, Cuff Size: Large)   Pulse 72   Temp (!) 96 3 °F (35 7 °C) (Tympanic)   Resp 16   Ht 4' 5" (1 346 m)   Wt 45 3 kg (99 lb 12 8 oz)   LMP 11/29/2009 (Exact Date)   BMI 24 98 kg/m²          Physical Exam   Constitutional: She appears well-nourished  HENT:   Head: Normocephalic and atraumatic  Eyes: Conjunctivae are normal    Cardiovascular: Normal rate, regular rhythm and normal heart sounds  Pulmonary/Chest: Effort normal and breath sounds normal    Abdominal: Soft   Bowel sounds are normal    Musculoskeletal: She exhibits edema (1+ pitting edema in ankles bilaterally)  Neurological: She is alert  Skin: Skin is warm  She is not diaphoretic

## 2019-10-03 ENCOUNTER — TELEPHONE (OUTPATIENT)
Dept: FAMILY MEDICINE CLINIC | Facility: CLINIC | Age: 59
End: 2019-10-03

## 2019-10-03 NOTE — TELEPHONE ENCOUNTER
Received fax from [facility] requesting PCP's signature  Form placed in provider's bin  Thank you

## 2019-10-22 ENCOUNTER — APPOINTMENT (EMERGENCY)
Dept: RADIOLOGY | Facility: HOSPITAL | Age: 59
End: 2019-10-22
Payer: COMMERCIAL

## 2019-10-22 ENCOUNTER — HOSPITAL ENCOUNTER (EMERGENCY)
Facility: HOSPITAL | Age: 59
Discharge: HOME | End: 2019-10-22
Attending: EMERGENCY MEDICINE
Payer: COMMERCIAL

## 2019-10-22 VITALS
DIASTOLIC BLOOD PRESSURE: 54 MMHG | SYSTOLIC BLOOD PRESSURE: 91 MMHG | RESPIRATION RATE: 20 BRPM | OXYGEN SATURATION: 100 % | HEART RATE: 73 BPM
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
## 2019-10-22 PROCEDURE — 70450 CT HEAD/BRAIN W/O DYE: CPT

## 2019-10-22 PROCEDURE — 99284 EMERGENCY DEPT VISIT MOD MDM: CPT

## 2019-10-22 PROCEDURE — 99284 EMERGENCY DEPT VISIT MOD MDM: CPT | Performed by: EMERGENCY MEDICINE

## 2019-10-22 NOTE — DISCHARGE INSTRUCTIONS
Today you were seen in the emergency department after a fall  We did a CT to rule out any bleeding in her head and did not see any bleeding  You should follow-up with her primary care doctor    Please return to the emergency department if you develop any new or otherwise concerning symptoms
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
## 2019-10-28 ENCOUNTER — OFFICE VISIT (OUTPATIENT)
Dept: FAMILY MEDICINE CLINIC | Facility: CLINIC | Age: 59
End: 2019-10-28

## 2019-10-28 VITALS
BODY MASS INDEX: 25.48 KG/M2 | HEART RATE: 80 BPM | DIASTOLIC BLOOD PRESSURE: 70 MMHG | WEIGHT: 101.8 LBS | SYSTOLIC BLOOD PRESSURE: 100 MMHG | RESPIRATION RATE: 18 BRPM | TEMPERATURE: 95.4 F

## 2019-10-28 DIAGNOSIS — S00.03XS: ICD-10-CM

## 2019-10-28 DIAGNOSIS — Z23 ENCOUNTER FOR IMMUNIZATION: ICD-10-CM

## 2019-10-28 DIAGNOSIS — Z91.81 HISTORY OF FALL: Primary | ICD-10-CM

## 2019-10-28 PROCEDURE — 99213 OFFICE O/P EST LOW 20 MIN: CPT | Performed by: FAMILY MEDICINE

## 2019-10-28 PROCEDURE — 90471 IMMUNIZATION ADMIN: CPT | Performed by: FAMILY MEDICINE

## 2019-10-28 PROCEDURE — 90471 IMMUNIZATION ADMIN: CPT

## 2019-10-28 PROCEDURE — 90682 RIV4 VACC RECOMBINANT DNA IM: CPT

## 2019-10-28 PROCEDURE — 90682 RIV4 VACC RECOMBINANT DNA IM: CPT | Performed by: FAMILY MEDICINE

## 2019-10-28 NOTE — PROGRESS NOTES
Assessment/Plan:       Diagnoses and all orders for this visit:    History of fall:  · Continue to monitor patient closely  · Supervise and  assist when patient is ambulating with walker  Encounter for immunization  -     influenza vaccine, [id number], quadrivalent, recombinant, PF, 0 5 mL, for patients 18 yr+ (FLUBLOK)    Hematoma of right parietal scalp, sequela:  · Secondary to fall  · CT head came back negative  · Hematoma is resolving  · Advised caregiver to continue to monitor  · Discussed return to office precautions:  If there is increase in size or redness discussed with caregiver  Follow-up with PCP next month  Subjective:      Patient ID: Gaspar Mohs is a 64 y o  female here for ER follow up  HPI:    65 yo F with PMH of CP and intellectual disablity here with caregiver for ER follow up  Patient was seen in ER for fall on 10/22/2019  As per caregiver patient was standing in a bus with her walker  She hit her right side of her head  She did not lose consciousness  In ER CT head came back negative  At today's visit, as per caregiver patient is doing well  She does have a right parietal hematoma which is resolving  She does have bruise on her right shoulder and skin lesion over left elbow  Patient does have a front wheel walker, needs assistance with front wheel walker and has lower extremity braces  Caregiver denies any other concerns        The following portions of the patient's history were reviewed and updated as appropriate:   She  has a past medical history of Adjustment disorder, Anemia, Bipolar 1 disorder ([address] ), Cerebral palsy ([address] ), Chronic hypernatremia (2/6/2016), Closed fracture of left hip ([address] ) (1/19/2016), Closed left hip fracture ([address] ), Constipation, Diabetes mellitus ([address] ), Disease of thyroid gland, Diverticulosis, Fracture of multiple ribs of right side, Hip fracture ([address] ) (07/26/2015), Hyperlipidemia, Hypotension, Impulse control disorder, Incontinence, Intellectual disability due to developmental disorder, unspecified, Microalbuminuria, Osteopathia, Osteoporosis, 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[facility] ), and Thrombocytopenia ([facility] ) (7/31/2015)  She   Patient Active Problem List    Diagnosis Date Noted    History of fall 10/28/2019    Abdominal bloating 09/26/2019    Breast asymmetry 07/09/2019    Encounter for examination following treatment at hospital 04/16/2019    Hypernatremia 12/28/2018    Gait disturbance 06/26/2018    Osteoarthritis of both hips 06/08/2018    Severe Intellectual disability 04/19/2018    Type 2 diabetes mellitus, without long-term current use of insulin ([facility]) 04/19/2018    Abnormal albumin 09/18/2017    Peripheral neuropathy 09/18/2017    Seasonal allergies 06/29/2017    Physical deconditioning 05/09/2017    Ceruminosis 04/21/2017    Hyperlipidemia 03/27/2017    Gastroesophageal reflux disease without esophagitis 03/27/2017    Cerebral palsy ([facility] ) 03/27/2017    Spasticity 03/27/2017    Ambulatory dysfunction 03/27/2017    Frequent UTI 03/27/2017    Bipolar disorder ([facility] ) 03/27/2017    Colostomy in place [facility]) 07/22/2016    Dehydration 02/20/2016    Osteoporosis 01/19/2016    Resting tremor 01/19/2016    Altered mental status 12/11/2015    Gait abnormality 12/11/2015    Other chronic pain 07/30/2015    Anxiety 07/15/2015    Menopause 09/25/2014    Chondrodermatitis nodularis helicis of left ear 28/30/6881    Atopic dermatitis 06/02/2014    Dry eye 06/02/2014    Herpes labialis 05/08/2014    Constipation 04/12/2013    Absolute anemia 03/27/2013    Urinary incontinence 03/27/2013    Hypothyroidism 01/10/2013     She  has a past surgical history that includes Abdominal surgery; pr close enterostomy (N/A, 7/22/2016); Colonoscopy (N/A, 7/21/2016); Exploratory laparotomy w/ bowel resection (N/A, 1/31/2016); Colonoscopy (N/A, 1/31/2016); Colonoscopy (N/A, 7/25/2017); Colostomy; and COLECTOMY MIN    Her family history includes Alcohol abuse in her father and mother; Diabetes in her sister; No Known Problems in her sister and sister  She  reports that she has never smoked  She has never used smokeless tobacco  She reports that she does not drink alcohol or use drugs  Current Outpatient Medications   Medication Sig Dispense Refill    acetaminophen (TYLENOL) 325 mg tablet Take 2 tablets (650 mg total) by mouth every 6 (six) hours as needed (pain, fever greater than 100 4) 240 tablet 0    ARIPiprazole (ABILIFY) 30 mg tablet Take 1 tablet (30 mg) by mouth daily at 8 pm  0    baclofen 10 mg tablet Take 1 tablet (10 mg total) by mouth 3 (three) times a day 90 tablet 6    benzonatate (TESSALON PERLES) 100 mg capsule Take 1 capsule (100 mg total) by mouth every 8 (eight) hours as needed for cough 30 capsule 0    calcium carbonate 1250 MG capsule Take 500 mg by mouth daily        carbamide peroxide (DEBROX) 6 5 % otic solution Administer 2 drops into both ears      carboxymethylcellulose (REFRESH TEARS) 0 5 % SOLN 1 drop 3 (three) times a day as needed for dry eyes      Cholecalciferol (VITAMIN D) 2000 UNITS tablet Take 2,000 Units by mouth daily   Citalopram Hydrobromide (CELEXA PO) Take 40 mg by mouth every morning        denosumab (PROLIA) 60 mg/mL Inject 1 mL (60 mg total) under the skin once for 1 dose 1 mL 1    Diaper Rash Products (A+D PREVENT) OINT Apply topically      divalproex sodium (DEPAKOTE) 500 mg EC tablet Take 2 tablets (1,000 mg total) by mouth daily every morning for bipolar  0    docosanol (ABREVA) 10 % Apply topically 5 (five) times a day Apply and rub in 5x a day until healed as needed for lesion  0    Docusate Sodium (COLACE PO) Take 100 mg by mouth 2 (two) times a day Hold one day for loose stools   ferrous sulfate 324 (65 Fe) mg Take 325 mg by mouth 2 (two) times a day        fexofenadine (ALLEGRA) 180 MG tablet Take 180 mg by mouth as needed        hydrocortisone 1 % cream Apply topically to affected area twice daily as needed for rash 30 g 0    levothyroxine (SYNTHROID) 25 mcg tablet Take 1 tablet (25 mcg) by mouth every morning  0    LORazepam (ATIVAN) 0 5 mg tablet Take 0 5 mg by mouth 2 (two) times a day   metFORMIN (GLUCOPHAGE) 1000 MG tablet Take 1 tablet (1,000 mg total) by mouth daily with breakfast 30 tablet 2    neomycin-bacitracin-polymyxin b (NEOSPORIN) ointment Apply 1 application topically 2 (two) times a day      nitrofurantoin (MACRODANTIN) 50 mg capsule Take 1 capsule by mouth Bedtime   olopatadine HCl (PATADAY) 0 2 % opth drops Administer 1 drop to both eyes as needed      omeprazole (PriLOSEC) 20 mg delayed release capsule Take 20 mg by mouth daily      Oxybutynin Chloride (DITROPAN XL PO) Take 5 mg by mouth 2 (two) times a day       Polyethylene Glycol 3350 (MIRALAX PO) Take 17 g by mouth every morning        simvastatin (ZOCOR) 20 mg tablet Take 1 tablet (20 mg total) by mouth daily at bedtime at 4 pm for hyperlipidemia  0    traZODone (DESYREL) 100 mg tablet Take 1 tablet (100 mg total) by mouth daily at  8 pm for depression  0    Vitamins A & D (VITAMIN A & D) ointment Apply topically as needed for dry skin       No current facility-administered medications for this visit       Review of Systems      Unable to perform, patient nonverbal     Objective:    Vitals:    10/28/19 0939   BP: 100/70   Pulse: 80   Resp: 18   Temp: (!) 95 4 °F (35 2 °C)       /70   Pulse 80   Temp (!) 95 4 °F (35 2 °C)   Resp 18   Wt 46 2 kg (101 lb 12 8 oz)   LMP 11/29/2009 (Exact Date)   BMI 25 48 kg/m²          Physical Exam   Constitutional: She appears well-developed and well-nourished  HENT:   Raised Swelling about 1 cm in bernardo over right parietal area and some erythema below swelling  Cardiovascular: Normal rate, regular rhythm, normal heart sounds and intact distal pulses  Pulmonary/Chest: Effort normal and breath sounds normal  No stridor   She has no wheezes  She has no rales  Abdominal: Soft  Bowel sounds are normal    Musculoskeletal:   Patient sitting on wheelchair, has lower extremity braces, no lower extremity swelling  Mild bruising over right shoulder back  Neurological: She is alert  Patient nonverbal   Skin: Skin is warm     Psychiatric:   Patient appears calm

## 2019-11-05 ENCOUNTER — OFFICE VISIT (OUTPATIENT)
Dept: OBGYN CLINIC | Facility: CLINIC | Age: 59
End: 2019-11-05

## 2019-11-05 VITALS
BODY MASS INDEX: 20.57 KG/M2 | HEART RATE: 66 BPM | DIASTOLIC BLOOD PRESSURE: 63 MMHG | HEIGHT: 58 IN | SYSTOLIC BLOOD PRESSURE: 102 MMHG | WEIGHT: 98 LBS

## 2019-11-05 DIAGNOSIS — Z12.31 BREAST CANCER SCREENING BY MAMMOGRAM: Primary | ICD-10-CM

## 2019-11-05 PROCEDURE — G0101 CA SCREEN;PELVIC/BREAST EXAM: HCPCS | Performed by: OBSTETRICS & GYNECOLOGY

## 2019-11-05 NOTE — PROGRESS NOTES
ASSESSMENT & PLAN: Lisa Elias is a 64 y o   with normal gynecologic exam     1   Routine well woman exam done today  2  Pap and HPV:  The patient's Pap and cotesting was not done today  Current ASCCP Guidelines reviewed  Next Pap is indicated in   3  Mammogram ordered for 2020  4  Colonoscopy due 2020  5  The following were reviewed in today's visit: breast self exam and mammography screening ordered  6  RTC   - Next annual visit    Chief Complaint:  Annual Gynecologic Examination    HPI: Lisa Elias is a 64 y o  [place] who presents for annual gynecologic examination  She has the following concerns:  none    OB History      Gyn History:  Postmenopausal with no recurrence of bleeding  History of sexually transmitted infection: No  History of abnormal pap smears:  No     Patient is not sexually active, nor has she ever been    Health Maintenance:    She eats an adequate and healthy diet  She does not exercise  She wears her seatbelt routinely  She is safe at home      Screening Exams:    PAP smear: NSIL, neg HPV , due for repeat in   Mammogram: indicated 2020, Rx ordered  Colonoscopy: last colonscopy 2017, due 2020  DEXA scan: not indicated, prior DEXA 18     Medical Hx  Past Medical History:   Diagnosis Date    Adjustment disorder     Anemia     Bipolar 1 disorder ([address] )     Cerebral palsy ([address] )     Chronic hypernatremia 2016    Closed fracture of left hip ([address] ) 2016    Closed left hip fracture (HCC)     no surgery    Constipation     Diabetes mellitus ([address] )     Disease of thyroid gland     Diverticulosis     Fracture of multiple ribs of right side     Hip fracture ([address] ) 2015    left    Hyperlipidemia     Hypotension     Impulse control disorder     Incontinence     Intellectual disability due to developmental disorder, unspecified     Microalbuminuria     Osteopathia     Osteoporosis     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[address] )     [facility] ) 7/31/2015     Surgical Hx  Past Surgical History:   Procedure Laterality Date    ABDOMINAL SURGERY      COLECTOMY MIN      re-anastomosis 7/22/16    COLONOSCOPY N/A 7/21/2016    Procedure: COLONOSCOPY;  Surgeon: Livia Contreras MD;  [facility]; Service:     COLONOSCOPY N/A 1/31/2016    Procedure: COLONOSCOPY;  Surgeon: Livia Contreras MD;  [facility];  Service:     COLONOSCOPY N/A 7/25/2017    Procedure: COLONOSCOPY;  Surgeon: Livia Contreras MD;  [facility];   Service: Colorectal    COLOSTOMY      EXPLORATORY LAPAROTOMY W/ BOWEL RESECTION N/A 1/31/2016    Procedure: exploratory laparotomy, left sigmoidectomy, coloproctostomy, take down splenic flexure, loop colostomy;  Surgeon: Livia Contreras MD;  [facility];  Service:     IA CLOSE ENTEROSTOMY N/A 7/22/2016    Procedure: SEGMENTAL COLECTOMY WITH COLOCOLOSTOMY;  Surgeon: Livia Contreras MD;  [facility];  Service: Colorectal     Family Hx  Family History   Problem Relation Age of Onset    Alcohol abuse Mother     Alcohol abuse Father     Diabetes Sister     No Known Problems Sister     No Known Problems Sister      Social Hx  Social History     Socioeconomic History    Marital status: Single     Spouse name: Not on file    Number of children: Not on file    Years of education: Not on file    Highest education level: Not on file   Occupational History    Not on file   Social Needs    Financial resource strain: Not on file    Food insecurity:     Worry: Not on file     Inability: Not on file    Transportation needs:     Medical: Not on file     Non-medical: Not on file   Tobacco Use    Smoking status: Never Smoker    Smokeless tobacco: Never Used   Substance and Sexual Activity    Alcohol use: No     Frequency: Never     Binge frequency: Never    Drug use: No    Sexual activity: Never   Lifestyle    Physical activity:     Days per week: Not on file Minutes per session: Not on file    Stress: Not on file   Relationships    Social connections:     Talks on phone: Not on file     Gets together: Not on file     Attends [employer]od[employer] service: Not on file     Active member of club or organization: Not on file     Attends meetings of clubs or organizations: Not on file     Relationship status: Not on file    Intimate partner violence:     Fear of current or ex partner: Not on file     Emotionally abused: Not on file     Physically abused: Not on file     Forced sexual activity: Not on file   Other Topics Concern    Not on file   Social History Narrative    Lives in a group home      Allergies  No Known Allergies  Meds    Current Outpatient Medications:     acetaminophen (TYLENOL) 325 mg tablet, Take 2 tablets (650 mg total) by mouth every 6 (six) hours as needed (pain, fever greater than 100 4), Disp: 240 tablet, Rfl: 0    ARIPiprazole (ABILIFY) 30 mg tablet, Take 1 tablet (30 mg) by mouth daily at 8 pm, Disp: , Rfl: 0    baclofen 10 mg tablet, Take 1 tablet (10 mg total) by mouth 3 (three) times a day, Disp: 90 tablet, Rfl: 6    benzonatate (TESSALON PERLES) 100 mg capsule, Take 1 capsule (100 mg total) by mouth every 8 (eight) hours as needed for cough, Disp: 30 capsule, Rfl: 0    calcium carbonate 1250 MG capsule, Take 500 mg by mouth daily  , Disp: , Rfl:     carbamide peroxide (DEBROX) 6 5 % otic solution, Administer 2 drops into both ears, Disp: , Rfl:     carboxymethylcellulose (REFRESH TEARS) 0 5 % SOLN, 1 drop 3 (three) times a day as needed for dry eyes, Disp: , Rfl:     Cholecalciferol (VITAMIN D) 2000 UNITS tablet, Take 2,000 Units by mouth daily  , Disp: , Rfl:     Citalopram Hydrobromide (CELEXA PO), Take 40 mg by mouth every morning  , Disp: , Rfl:     denosumab (PROLIA) 60 mg/mL, Inject 1 mL (60 mg total) under the skin once for 1 dose, Disp: 1 mL, Rfl: 1    Diaper Rash Products (A+D PREVENT) OINT, Apply topically, Disp: , Rfl:    divalproex sodium (DEPAKOTE) 500 mg EC tablet, Take 2 tablets (1,000 mg total) by mouth daily every morning for bipolar, Disp: , Rfl: 0    docosanol (ABREVA) 10 %, Apply topically 5 (five) times a day Apply and rub in 5x a day until healed as needed for lesion, Disp: , Rfl: 0    Docusate Sodium (COLACE PO), Take 100 mg by mouth 2 (two) times a day Hold one day for loose stools  , Disp: , Rfl:     ferrous sulfate 324 (65 Fe) mg, Take 325 mg by mouth 2 (two) times a day , Disp: , Rfl:     fexofenadine (ALLEGRA) 180 MG tablet, Take 180 mg by mouth as needed  , Disp: , Rfl:     hydrocortisone 1 % cream, Apply topically to affected area twice daily as needed for rash, Disp: 30 g, Rfl: 0    levothyroxine (SYNTHROID) 25 mcg tablet, Take 1 tablet (25 mcg) by mouth every morning, Disp: , Rfl: 0    LORazepam (ATIVAN) 0 5 mg tablet, Take 0 5 mg by mouth 2 (two) times a day   , Disp: , Rfl:     metFORMIN (GLUCOPHAGE) 1000 MG tablet, Take 1 tablet (1,000 mg total) by mouth daily with breakfast, Disp: 30 tablet, Rfl: 2    neomycin-bacitracin-polymyxin b (NEOSPORIN) ointment, Apply 1 application topically 2 (two) times a day, Disp: , Rfl:     nitrofurantoin (MACRODANTIN) 50 mg capsule, Take 1 capsule by mouth Bedtime  , Disp: , Rfl:     olopatadine HCl (PATADAY) 0 2 % opth drops, Administer 1 drop to both eyes as needed, Disp: , Rfl:     omeprazole (PriLOSEC) 20 mg delayed release capsule, Take 20 mg by mouth daily, Disp: , Rfl:     Oxybutynin Chloride (DITROPAN XL PO), Take 5 mg by mouth 2 (two) times a day , Disp: , Rfl:     Polyethylene Glycol 3350 (MIRALAX PO), Take 17 g by mouth every morning  , Disp: , Rfl:     simvastatin (ZOCOR) 20 mg tablet, Take 1 tablet (20 mg total) by mouth daily at bedtime at 4 pm for hyperlipidemia, Disp: , Rfl: 0    traZODone (DESYREL) 100 mg tablet, Take 1 tablet (100 mg total) by mouth daily at  8 pm for depression, Disp: , Rfl: 0    Vitamins A & D (VITAMIN A & D) ointment, Apply topically as needed for dry skin, Disp: , Rfl:      Review of Systems:  A complete review of systems was performed and was negative, except as listed      Objective   Vitals:    11/05/19 1320   BP: 102/63   Pulse: 66     Physical Exam:  General:   alert and oriented, in no acute distress, alert, appears stated age and cooperative   Heart: regular rate and rhythm, S1, S2 normal, no murmur, click, rub or gallop and regular rate and rhythm   Lungs: No respiratory distress   Abdomen: soft, non-tender, without masses or organomegaly   Vulva: normal   Vagina: normal mucosa, normal discharge, no bleeding present   Cervix: anteverted, no lesions and nulliparous appearance   Uterus: normal size   Adnexa: not evaluated        Breast: no abnormalities noted bilaterally in regards to skin changes, lumps, bumps and lesions    Brady Ramirez DO  PGY-1 OB/GYN   11/5/2019 1:37 PM

## 2019-11-05 NOTE — PATIENT INSTRUCTIONS
Wellness Visit for Adults   WHAT YOU NEED TO KNOW:   What is a wellness visit? A wellness visit is when you see your healthcare provider to get screened for health problems  You can also get advice on how to stay healthy  Write down your questions so you remember to ask them  Ask your healthcare provider how often you should have a wellness visit  What happens at a wellness visit? Your healthcare provider will ask about your health, and your family history of health problems  This includes high blood pressure, heart disease, and cancer  He or she will ask if you have symptoms that concern you, if you smoke, and about your mood  You may also be asked about your intake of medicines, supplements, food, and alcohol  Any of the following may be done:  · Your weight  will be checked  Your height may also be checked so your body mass index (BMI) can be calculated  Your BMI shows if you are at a healthy weight  · Your blood pressure  and heart rate will be checked  Your temperature may also be checked  · Blood and urine tests  may be done  Blood tests may be done to check your cholesterol levels  Abnormal cholesterol levels increase your risk for heart disease and stroke  You may also need a blood or urine test to check for diabetes if you are at increased risk  Urine tests may be done to look for signs of an infection or kidney disease  · A physical exam  includes checking your heartbeat and lungs with a stethoscope  Your healthcare provider may also check your skin to look for sun damage  · Screening tests  may be recommended  A screening test is done to check for diseases that may not cause symptoms  The screening tests you may need depend on your age, gender, family history, and lifestyle habits  For example, colorectal screening may be recommended if you are 48years old or older  What screening tests do I need if I am a woman? · A Pap smear  is used to screen for cervical cancer   Pap smears are usually done every 3 to 5 years depending on your age  You may need them more often if you have had abnormal Pap smear test results in the past  Ask your healthcare provider how often you should have a Pap smear  · A mammogram  is an x-ray of your breasts to screen for breast cancer  Experts recommend mammograms every 2 years starting at age 48 years  You may need a mammogram at age 52 years or younger if you have an increased risk for breast cancer  Talk to your healthcare provider about when you should start having mammograms and how often you need them  What vaccines might I need? · Get an influenza vaccine  every year  The influenza vaccine protects you from the flu  Several types of viruses cause the flu  The viruses change over time, so new vaccines are made each year  · Get a tetanus-diphtheria (Td) booster vaccine  every 10 years  This vaccine protects you against tetanus and diphtheria  Tetanus is a severe infection that may cause painful muscle spasms and lockjaw  Diphtheria is a severe bacterial infection that causes a thick covering in the back of your mouth and throat  · Get a human papillomavirus (HPV) vaccine  if you are female and aged 23 to 32 or male 23 to 24 and never received it  This vaccine protects you from HPV infection  HPV is the most common infection spread by sexual contact  HPV may also cause vaginal, penile, and anal cancers  · Get a pneumococcal vaccine  if you are aged 72 years or older  The pneumococcal vaccine is an injection given to protect you from pneumococcal disease  Pneumococcal disease is an infection caused by pneumococcal bacteria  The infection may cause pneumonia, meningitis, or an ear infection  · Get a shingles vaccine  if you are aged 61 or older, even if you have had shingles before  The shingles vaccine is an injection to protect you from the varicella-zoster virus  This is the same virus that causes chickenpox   Shingles is a painful rash that develops in people who had chickenpox or have been exposed to the virus  How can I eat healthy? My Plate is a model for planning healthy meals  It shows the types and amounts of foods that should go on your plate  Fruits and vegetables make up about half of your plate, and grains and protein make up the other half  A serving of dairy is included on the side of your plate  The amount of calories and serving sizes you need depends on your age, gender, weight, and height  Examples of healthy foods are listed below:  · Eat a variety of vegetables  such as dark green, red, and orange vegetables  You can also include canned vegetables low in sodium (salt) and frozen vegetables without added butter or sauces  · Eat a variety of fresh fruits , canned fruit in 100% juice, frozen fruit, and dried fruit  · Include whole grains  At least half of the grains you eat should be whole grains  Examples include whole-wheat bread, wheat pasta, brown rice, and whole-grain cereals such as oatmeal     · Eat a variety of protein foods such as seafood (fish and shellfish), lean meat, and poultry without skin (turkey and chicken)  Examples of lean meats include pork leg, shoulder, or tenderloin, and beef round, sirloin, tenderloin, and extra lean ground beef  Other protein foods include eggs and egg substitutes, beans, peas, soy products, nuts, and seeds  · Choose low-fat dairy products such as skim or 1% milk or low-fat yogurt, cheese, and cottage cheese  · Limit unhealthy fats  such as butter, hard margarine, and shortening  How much exercise do I need? Exercise at least 30 minutes per day on most days of the week  Some examples of exercise include walking, biking, dancing, and swimming  You can also fit in more physical activity by taking the stairs instead of the elevator or parking farther away from stores  Include muscle strengthening activities 2 days each week  Regular exercise provides many health benefits  It helps you manage your weight, and decreases your risk for type 2 diabetes, heart disease, stroke, and high blood pressure  Exercise can also help improve your mood  Ask your healthcare provider about the best exercise plan for you  What are some general health and safety guidelines I should follow? · Do not smoke  Nicotine and other chemicals in cigarettes and cigars can cause lung damage  Ask your healthcare provider for information if you currently smoke and need help to quit  E-cigarettes or smokeless tobacco still contain nicotine  Talk to your healthcare provider before you use these products  · Limit alcohol  A drink of alcohol is 12 ounces of beer, 5 ounces of wine, or 1½ ounces of liquor  · Lose weight, if needed  Being overweight increases your risk of certain health conditions  These include heart disease, high blood pressure, type 2 diabetes, and certain types of cancer  · Protect your skin  Do not sunbathe or use tanning beds  Use sunscreen with a SPF 15 or higher  Apply sunscreen at least 15 minutes before you go outside  Reapply sunscreen every 2 hours  Wear protective clothing, hats, and sunglasses when you are outside  · Drive safely  Always wear your seatbelt  Make sure everyone in your car wears a seatbelt  A seatbelt can save your life if you are in an accident  Do not use your cell phone when you are driving  This could distract you and cause an accident  Pull over if you need to make a call or send a text message  · Practice safe sex  Use latex condoms if are sexually active and have more than one partner  Your healthcare provider may recommend screening tests for sexually transmitted infections (STIs)  · Wear helmets, lifejackets, and protective gear  Always wear a helmet when you ride a bike or motorcycle, go skiing, or play sports that could cause a head injury  Wear protective equipment when you play sports   Wear a lifejacket when you are on a boat or doing water sports  CARE AGREEMENT:   You have the right to help plan your care  Learn about your health condition and how it may be treated  Discuss treatment options with your caregivers to decide what care you want to receive  You always have the right to refuse treatment  The above information is an  only  It is not intended as medical advice for individual conditions or treatments  Talk to your doctor, nurse or pharmacist before following any medical regimen to see if it is safe and effective for you  © 2017 [employer] Information is for End User's use only and may not be sold, redistributed or otherwise used for commercial purposes  All illustrations and images included in CareNotes® are the copyrighted property of A [employer]  or Thad Jackson

## 2019-11-08 ENCOUNTER — TELEPHONE (OUTPATIENT)
Dept: NEUROLOGY | Facility: CLINIC | Age: 59
End: 2019-11-08
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
## 2019-11-12 ENCOUNTER — OFFICE VISIT (OUTPATIENT)
Dept: NEUROLOGY | Facility: CLINIC | Age: 59
End: 2019-11-12
Payer: COMMERCIAL

## 2019-11-12 VITALS
RESPIRATION RATE: 16 BRPM | HEART RATE: 75 BPM | BODY MASS INDEX: 21.94 KG/M2 | OXYGEN SATURATION: 93 % | WEIGHT: 104.5 LBS | SYSTOLIC BLOOD PRESSURE: 112 MMHG | HEIGHT: 58 IN | DIASTOLIC BLOOD PRESSURE: 62 MMHG

## 2019-11-12 DIAGNOSIS — G80.0 SPASTIC QUADRIPLEGIC CEREBRAL PALSY (HCC): Primary | ICD-10-CM

## 2019-11-12 DIAGNOSIS — I69.398 SPASTICITY AS LATE EFFECT OF CEREBROVASCULAR ACCIDENT (CVA): ICD-10-CM

## 2019-11-12 DIAGNOSIS — G24.3 CERVICAL DYSTONIA: ICD-10-CM

## 2019-11-12 DIAGNOSIS — R25.2 SPASTICITY AS LATE EFFECT OF CEREBROVASCULAR ACCIDENT (CVA): ICD-10-CM

## 2019-11-12 PROCEDURE — 99214 OFFICE O/P EST MOD 30 MIN: CPT | Performed by: PHYSICAL MEDICINE & REHABILITATION

## 2019-11-12 RX ORDER — BACLOFEN 10 MG/1
10 TABLET ORAL 3 TIMES DAILY
Qty: 90 TABLET | Refills: 6 | Status: SHIPPED | OUTPATIENT
Start: 2019-11-12 | End: 2020-04-22 | Stop reason: SDUPTHER

## 2019-11-26 ENCOUNTER — OFFICE VISIT (OUTPATIENT)
Dept: FAMILY MEDICINE CLINIC | Facility: CLINIC | Age: 59
End: 2019-11-26

## 2019-11-26 VITALS
SYSTOLIC BLOOD PRESSURE: 102 MMHG | WEIGHT: 98 LBS | RESPIRATION RATE: 16 BRPM | TEMPERATURE: 96.7 F | HEART RATE: 58 BPM | DIASTOLIC BLOOD PRESSURE: 60 MMHG | BODY MASS INDEX: 20.48 KG/M2

## 2019-11-26 DIAGNOSIS — Z11.1 ENCOUNTER FOR PPD TEST: ICD-10-CM

## 2019-11-26 DIAGNOSIS — E11.9 TYPE 2 DIABETES MELLITUS WITHOUT COMPLICATION, WITHOUT LONG-TERM CURRENT USE OF INSULIN (HCC): Primary | ICD-10-CM

## 2019-11-26 DIAGNOSIS — Z11.4 SCREENING FOR HIV (HUMAN IMMUNODEFICIENCY VIRUS): ICD-10-CM

## 2019-11-26 DIAGNOSIS — M20.60 DEFORMITY OF TOE, UNSPECIFIED LATERALITY: ICD-10-CM

## 2019-11-26 DIAGNOSIS — M21.372 FOOT DROP, BILATERAL: ICD-10-CM

## 2019-11-26 DIAGNOSIS — M21.371 FOOT DROP, BILATERAL: ICD-10-CM

## 2019-11-26 DIAGNOSIS — R09.89 POOR PERIPHERAL CIRCULATION: ICD-10-CM

## 2019-11-26 DIAGNOSIS — Z11.1 PPD SCREENING TEST: ICD-10-CM

## 2019-11-26 DIAGNOSIS — K21.9 GASTROESOPHAGEAL REFLUX DISEASE WITHOUT ESOPHAGITIS: ICD-10-CM

## 2019-11-26 DIAGNOSIS — K21.9 GASTROESOPHAGEAL REFLUX DISEASE, ESOPHAGITIS PRESENCE NOT SPECIFIED: ICD-10-CM

## 2019-11-26 DIAGNOSIS — G63 POLYNEUROPATHY ASSOCIATED WITH UNDERLYING DISEASE (HCC): ICD-10-CM

## 2019-11-26 DIAGNOSIS — G80.0 SPASTIC QUADRIPLEGIC CEREBRAL PALSY (HCC): ICD-10-CM

## 2019-11-26 DIAGNOSIS — M81.0 OSTEOPOROSIS, UNSPECIFIED OSTEOPOROSIS TYPE, UNSPECIFIED PATHOLOGICAL FRACTURE PRESENCE: ICD-10-CM

## 2019-11-26 DIAGNOSIS — K59.00 CONSTIPATION, UNSPECIFIED CONSTIPATION TYPE: ICD-10-CM

## 2019-11-26 PROBLEM — E87.0 HYPERNATREMIA: Status: RESOLVED | Noted: 2018-12-28 | Resolved: 2019-11-26

## 2019-11-26 PROBLEM — N39.0 FREQUENT UTI: Status: RESOLVED | Noted: 2017-03-27 | Resolved: 2019-11-26

## 2019-11-26 PROCEDURE — 1036F TOBACCO NON-USER: CPT | Performed by: FAMILY MEDICINE

## 2019-11-26 PROCEDURE — 86580 TB INTRADERMAL TEST: CPT | Performed by: FAMILY MEDICINE

## 2019-11-26 PROCEDURE — 99213 OFFICE O/P EST LOW 20 MIN: CPT | Performed by: FAMILY MEDICINE

## 2019-11-26 RX ORDER — FAMOTIDINE 20 MG/1
20 TABLET, FILM COATED ORAL DAILY
Qty: 30 TABLET | Refills: 1 | Status: SHIPPED | OUTPATIENT
Start: 2019-11-26 | End: 2020-08-26 | Stop reason: SDUPTHER
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
## 2019-11-26 NOTE — LETTER
November 26, 2019     Patient: Nelson Livingston   YOB: 1960   Date of Visit: 11/26/2019       Discontinue Prilosec   Start Pepcid 20 mg daily        Urbano Del Toro MD
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
## 2019-11-26 NOTE — ASSESSMENT & PLAN NOTE
Well controlled  Due to long term, PPI effects, will discontinue Prilosec and start Pepcid 20 mg daily

## 2019-11-26 NOTE — ASSESSMENT & PLAN NOTE
Lab Results   Component Value Date    HGBA1C 5 5 09/10/2019     Well controlled on Metformin 1000 mg daily   Last Hba1c prior to med change was 5 5 9/10/19   Diabetic eye exam completed 7/1/2019  No evidence of retinopathy  Diabetic foot exam performed today  Patient has history of poor circulation, diabetic neuropathy ( although difficut to assess given her non verbal status), foot deformity, occasional swelling,and foot drop  Lipid panel 3/2019 was within normal limits  Script for inserts and diabetic shoes provided  Repeat Hba1c in 1 month  Consider discontinue metformin but for now, continue metformin 100 mg daily

## 2019-11-27 ENCOUNTER — APPOINTMENT (OUTPATIENT)
Dept: LAB | Facility: MEDICAL CENTER | Age: 59
End: 2019-11-27
Payer: COMMERCIAL

## 2019-11-27 DIAGNOSIS — M81.0 OSTEOPOROSIS, UNSPECIFIED OSTEOPOROSIS TYPE, UNSPECIFIED PATHOLOGICAL FRACTURE PRESENCE: ICD-10-CM

## 2019-11-27 DIAGNOSIS — E83.52 HYPERCALCEMIA: Primary | ICD-10-CM

## 2019-11-27 LAB
ALBUMIN SERPL BCP-MCNC: 3.3 G/DL (ref 3.5–5)
ANION GAP SERPL CALCULATED.3IONS-SCNC: 2 MMOL/L (ref 4–13)
BUN SERPL-MCNC: 25 MG/DL (ref 5–25)
CALCIUM ALBUM COR SERPL-MCNC: 10.9 MG/DL (ref 8.3–10.1)
CALCIUM SERPL-MCNC: 10.3 MG/DL (ref 8.3–10.1)
CALCIUM SERPL-MCNC: 10.3 MG/DL (ref 8.3–10.1)
CHLORIDE SERPL-SCNC: 103 MMOL/L (ref 100–108)
CO2 SERPL-SCNC: 34 MMOL/L (ref 21–32)
CREAT SERPL-MCNC: 1.1 MG/DL (ref 0.6–1.3)
GFR SERPL CREATININE-BSD FRML MDRD: 56 ML/MIN/1.73SQ M
GLUCOSE P FAST SERPL-MCNC: 179 MG/DL (ref 65–99)
POTASSIUM SERPL-SCNC: 4.1 MMOL/L (ref 3.5–5.3)
SODIUM SERPL-SCNC: 139 MMOL/L (ref 136–145)

## 2019-11-27 PROCEDURE — 36415 COLL VENOUS BLD VENIPUNCTURE: CPT

## 2019-11-27 PROCEDURE — 80048 BASIC METABOLIC PNL TOTAL CA: CPT

## 2019-11-27 PROCEDURE — 82040 ASSAY OF SERUM ALBUMIN: CPT

## 2019-12-02 ENCOUNTER — TELEPHONE (OUTPATIENT)
Dept: FAMILY MEDICINE CLINIC | Facility: CLINIC | Age: 59
End: 2019-12-02

## 2019-12-03 NOTE — TELEPHONE ENCOUNTER
Patient is scheduled for Prolia on Wed  Is there any concerns with administration since her calcium is elevated?

## 2019-12-04 ENCOUNTER — CLINICAL SUPPORT (OUTPATIENT)
Dept: FAMILY MEDICINE CLINIC | Facility: CLINIC | Age: 59
End: 2019-12-04

## 2019-12-04 DIAGNOSIS — M81.0 OSTEOPOROSIS, UNSPECIFIED OSTEOPOROSIS TYPE, UNSPECIFIED PATHOLOGICAL FRACTURE PRESENCE: Primary | ICD-10-CM

## 2019-12-04 PROCEDURE — 96372 THER/PROPH/DIAG INJ SC/IM: CPT

## 2019-12-10 ENCOUNTER — TELEPHONE (OUTPATIENT)
Dept: FAMILY MEDICINE CLINIC | Facility: CLINIC | Age: 59
End: 2019-12-10

## 2019-12-10 DIAGNOSIS — R25.2 SPASTICITY: ICD-10-CM

## 2019-12-10 DIAGNOSIS — G80.0 SPASTIC QUADRIPLEGIC CEREBRAL PALSY (HCC): Primary | ICD-10-CM

## 2019-12-10 DIAGNOSIS — R26.9 GAIT ABNORMALITY: ICD-10-CM

## 2019-12-10 NOTE — TELEPHONE ENCOUNTER
Call regarding FLORENCIO angulo broke   Please fax order stating "Adjust and or modify AFO"  Fax to [facility] at [phone]

## 2019-12-19 ENCOUNTER — APPOINTMENT (OUTPATIENT)
Dept: LAB | Facility: CLINIC | Age: 59
End: 2019-12-19
Payer: COMMERCIAL

## 2019-12-19 DIAGNOSIS — Z11.4 SCREENING FOR HIV (HUMAN IMMUNODEFICIENCY VIRUS): ICD-10-CM

## 2019-12-19 DIAGNOSIS — E11.9 TYPE 2 DIABETES MELLITUS WITHOUT COMPLICATION, WITHOUT LONG-TERM CURRENT USE OF INSULIN (HCC): ICD-10-CM

## 2019-12-19 DIAGNOSIS — M81.0 OSTEOPOROSIS, UNSPECIFIED OSTEOPOROSIS TYPE, UNSPECIFIED PATHOLOGICAL FRACTURE PRESENCE: ICD-10-CM

## 2019-12-19 LAB
ANION GAP SERPL CALCULATED.3IONS-SCNC: 5 MMOL/L (ref 4–13)
BUN SERPL-MCNC: 20 MG/DL (ref 5–25)
CALCIUM SERPL-MCNC: 9.5 MG/DL (ref 8.3–10.1)
CHLORIDE SERPL-SCNC: 108 MMOL/L (ref 100–108)
CO2 SERPL-SCNC: 30 MMOL/L (ref 21–32)
CREAT SERPL-MCNC: 1.17 MG/DL (ref 0.6–1.3)
EST. AVERAGE GLUCOSE BLD GHB EST-MCNC: 163 MG/DL
GFR SERPL CREATININE-BSD FRML MDRD: 52 ML/MIN/1.73SQ M
GLUCOSE SERPL-MCNC: 233 MG/DL (ref 65–140)
HBA1C MFR BLD: 7.3 % (ref 4.2–6.3)
POTASSIUM SERPL-SCNC: 4.4 MMOL/L (ref 3.5–5.3)
SODIUM SERPL-SCNC: 143 MMOL/L (ref 136–145)

## 2019-12-19 PROCEDURE — 80048 BASIC METABOLIC PNL TOTAL CA: CPT

## 2019-12-19 PROCEDURE — 36415 COLL VENOUS BLD VENIPUNCTURE: CPT

## 2019-12-19 PROCEDURE — 83036 HEMOGLOBIN GLYCOSYLATED A1C: CPT

## 2019-12-19 PROCEDURE — 87389 HIV-1 AG W/HIV-1&-2 AB AG IA: CPT

## 2019-12-20 ENCOUNTER — TELEPHONE (OUTPATIENT)
Dept: FAMILY MEDICINE CLINIC | Facility: CLINIC | Age: 59
End: 2019-12-20

## 2019-12-20 DIAGNOSIS — E11.9 TYPE 2 DIABETES MELLITUS WITHOUT COMPLICATION, WITHOUT LONG-TERM CURRENT USE OF INSULIN (HCC): ICD-10-CM

## 2019-12-20 LAB — HIV 1+2 AB+HIV1 P24 AG SERPL QL IA: NORMAL

## 2019-12-20 NOTE — TELEPHONE ENCOUNTER
Please fax script for Metfromin to Andrews Guzman [facility]  Fax number [phone]  The script was already sent to the [facility] but they need a written script for their records   Thank you

## 2019-12-20 NOTE — TELEPHONE ENCOUNTER
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

## 2020-01-21 ENCOUNTER — OFFICE VISIT (OUTPATIENT)
Dept: FAMILY MEDICINE CLINIC | Facility: CLINIC | Age: 60
End: 2020-01-21

## 2020-01-21 VITALS
BODY MASS INDEX: 22.46 KG/M2 | SYSTOLIC BLOOD PRESSURE: 104 MMHG | RESPIRATION RATE: 18 BRPM | HEART RATE: 78 BPM | HEIGHT: 58 IN | DIASTOLIC BLOOD PRESSURE: 70 MMHG | TEMPERATURE: 95.8 F | WEIGHT: 107 LBS

## 2020-01-21 DIAGNOSIS — E11.9 TYPE 2 DIABETES MELLITUS WITHOUT COMPLICATION, WITHOUT LONG-TERM CURRENT USE OF INSULIN (HCC): Primary | ICD-10-CM

## 2020-01-21 DIAGNOSIS — F31.9 BIPOLAR AFFECTIVE DISORDER, REMISSION STATUS UNSPECIFIED (HCC): ICD-10-CM

## 2020-01-21 DIAGNOSIS — M81.0 OSTEOPOROSIS, UNSPECIFIED OSTEOPOROSIS TYPE, UNSPECIFIED PATHOLOGICAL FRACTURE PRESENCE: ICD-10-CM

## 2020-01-21 DIAGNOSIS — E03.9 HYPOTHYROIDISM, UNSPECIFIED TYPE: ICD-10-CM

## 2020-01-21 DIAGNOSIS — G80.0 SPASTIC QUADRIPLEGIC CEREBRAL PALSY (HCC): ICD-10-CM

## 2020-01-21 DIAGNOSIS — Z86.39 HISTORY OF VITAMIN D DEFICIENCY: ICD-10-CM

## 2020-01-21 PROCEDURE — 3008F BODY MASS INDEX DOCD: CPT | Performed by: FAMILY MEDICINE

## 2020-01-21 PROCEDURE — 99213 OFFICE O/P EST LOW 20 MIN: CPT | Performed by: FAMILY MEDICINE

## 2020-01-21 PROCEDURE — 1036F TOBACCO NON-USER: CPT | Performed by: FAMILY MEDICINE

## 2020-01-21 NOTE — PROGRESS NOTES
-Assessment/Plan:    Type 2 diabetes mellitus, without long-term current use of insulin ([facility])    Lab Results   Component Value Date    HGBA1C 7 3 (H) 12/19/2019     Uncontrolled diabetes without comorbid conditions  Goal A1c less than 7  Fasting blood sugars 100-166  Will continue metformin 1000 mg b i d  Encourage weight control and diabetic diet   UTD on foot exam  Had an upcoming appointment for diabetic eye exam  Urine microalbumin collected within the last year      Hypothyroidism  Controlled and without symptoms suggestive of hypo or hyperthyroidism  Last TSH in March 2019 was 3 4  Recheck ordered for March  Continue Synthroid 25 mcg daily    Osteoporosis  History of osteoporosis with a T-score of -2 9 in the right femoral neck on last DEXA scan in January 30, 2018  Patient currently on calcium and vitamin-D supplementation  She is also receiving Prolia injections  Last calcium was 9 5  Patient is due for another injection in May  Will order BMP prior to and following administration  Given that it has been 2 years, will order repeat imaging  Continue calcium and vitamin-D supplementation         Problem List Items Addressed This Visit        Endocrine    Type 2 diabetes mellitus, without long-term current use of insulin ([facility] [address] ) - Primary       Lab Results   Component Value Date    HGBA1C 7 3 (H) 12/19/2019     Uncontrolled diabetes without comorbid conditions  Goal A1c less than 7  Fasting blood sugars 100-166  Will continue metformin 1000 mg b i d    Encourage weight control and diabetic diet   UTD on foot exam  Had an upcoming appointment for diabetic eye exam  Urine microalbumin collected within the last year           Relevant Orders    Lipid panel    Comprehensive metabolic panel    HEMOGLOBIN A1C W/ EAG ESTIMATION    Hypothyroidism     Controlled and without symptoms suggestive of hypo or hyperthyroidism  Last TSH in March 2019 was 3 4  Recheck ordered for March  Continue Synthroid 25 mcg daily Relevant Orders    TSH, 3rd generation       Nervous and Auditory    Cerebral palsy ([facility] [address] )    Relevant Orders    Brace    CK (with reflex to MB)       Musculoskeletal and Integument    Osteoporosis     History of osteoporosis with a T-score of -2 9 in the right femoral neck on last DEXA scan in January 30, 2018  Patient currently on calcium and vitamin-D supplementation  She is also receiving Prolia injections  Last calcium was 9 5  Patient is due for another injection in May  Will order BMP prior to and following administration  Given that it has been 2 years, will order repeat imaging  Continue calcium and vitamin-D supplementation         Relevant Orders    DXA bone density spine hip and pelvis    Vitamin D 25 hydroxy       Other    Bipolar disorder ([facility] [address] )    Relevant Orders    CBC and differential    History of vitamin D deficiency    Relevant Orders    Vitamin D 25 hydroxy            Subjective:      Patient ID: Kenyetta Serna is a 64 y o  female  51-year-old female with history of cerebral palsy,, type 2 diabetes, hypothyroidism,  Osteoporosis, and intellectual disability who presents with caretaker, Gladys,  for follow-up  She was last seen 11/26/ 19 for diabetes follow-up  Foot exam was performed and showed no evidence of infection or pre ulcers  A1c was ordered and later found to be elevated at 7 3 ( previous a1c 5 5)  Metformin was increased to 1000 mg BID  Last diabetic eye exam completed 7/1/2019 showed no evidence of retinopathy  Lipid panel 3/2019 was within normal limits  Additionally, patient has a hsitory of osteoporosis and received prolia injection 1/4/19  BMP prior to injection measured an elevated calcium level of 10 3 and repeat came down to 9 5  Last DEXA scan 1 30 18 measured a T score of 2 9 in right femoral neck  Today, caretaker offers no acute concerns  Home -166 and BP well controlled  Next prolia in May   Wondering If oxybutinin should be continued as they are doing a scheduled toileting  Does report ocassional incontinence between voids  Also need a left hand brace for contractures and CPK given her history of bipolar and multiple psych medications  The following portions of the patient's history were reviewed and updated as appropriate: She  has a past medical history of Adjustment disorder, Altered mental status (12/11/2015), Anemia, Bipolar 1 disorder ([facility] [address] ), Cerebral palsy ([facility] ), Chronic hypernatremia (2/6/2016), Closed fracture of left hip ([facility] ) (1/19/2016), Closed left hip fracture ([facility] ), Constipation, Dehydration (2/20/2016), Diabetes mellitus ([facility] ), Disease of thyroid gland, Diverticulosis, Fracture of multiple ribs of right side, Hip fracture ([facility] ) (07/26/2015), Hyperlipidemia, Hypernatremia (12/28/2018), Hypotension, Impulse control disorder, Incontinence, Intellectual disability due to developmental disorder, unspecified, Microalbuminuria, Osteopathia, Osteoporosis, 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[facility] ), and Thrombocytopenia ([facility] ) (7/31/2015)    She   Patient Active Problem List    Diagnosis Date Noted    History of vitamin D deficiency 01/22/2020    Encounter for PPD test 11/05/2019    History of fall 10/28/2019    Abdominal bloating 09/26/2019    Breast asymmetry 07/09/2019    Encounter for examination following treatment at hospital 04/16/2019    Gait disturbance 06/26/2018    Osteoarthritis of both hips 06/08/2018    Severe Intellectual disability 04/19/2018    Type 2 diabetes mellitus, without long-term current use of insulin ([facility]) 04/19/2018    Abnormal albumin 09/18/2017    Peripheral neuropathy 09/18/2017    Seasonal allergies 06/29/2017    Physical deconditioning 05/09/2017    Ceruminosis 04/21/2017    Hyperlipidemia 03/27/2017    Gastroesophageal reflux disease without esophagitis 03/27/2017    Cerebral palsy ([facility] [address] ) 03/27/2017    Spasticity 03/27/2017    Ambulatory dysfunction 03/27/2017    Bipolar disorder ([facility] ) 03/27/2017    Colostomy in place [facility]) 07/22/2016    Osteoporosis 01/19/2016    Resting tremor 01/19/2016    Gait abnormality 12/11/2015    Other chronic pain 07/30/2015    Anxiety 07/15/2015    Menopause 09/25/2014    Chondrodermatitis nodularis helicis of left ear 36/34/9966    Atopic dermatitis 06/02/2014    Dry eye 06/02/2014    Herpes labialis 05/08/2014    Constipation 04/12/2013    Absolute anemia 03/27/2013    Urinary incontinence 03/27/2013    Hypothyroidism 01/10/2013     She  has a past surgical history that includes Abdominal surgery; pr close enterostomy (N/A, 7/22/2016); Colonoscopy (N/A, 7/21/2016); Exploratory laparotomy w/ bowel resection (N/A, 1/31/2016); Colonoscopy (N/A, 1/31/2016); Colonoscopy (N/A, 7/25/2017); Colostomy; and COLECTOMY MIN  Current Outpatient Medications   Medication Sig Dispense Refill    acetaminophen (TYLENOL) 325 mg tablet Take 2 tablets (650 mg total) by mouth every 6 (six) hours as needed (pain, fever greater than 100 4) 240 tablet 0    ARIPiprazole (ABILIFY) 30 mg tablet Take 1 tablet (30 mg) by mouth daily at 8 pm  0    baclofen 10 mg tablet Take 1 tablet (10 mg total) by mouth 3 (three) times a day 90 tablet 6    benzonatate (TESSALON PERLES) 100 mg capsule Take 1 capsule (100 mg total) by mouth every 8 (eight) hours as needed for cough 30 capsule 0    carbamide peroxide (DEBROX) 6 5 % otic solution Administer 2 drops into both ears      carboxymethylcellulose (REFRESH TEARS) 0 5 % SOLN 1 drop 3 (three) times a day as needed for dry eyes      Cholecalciferol (VITAMIN D) 2000 UNITS tablet Take 2,000 Units by mouth daily        Citalopram Hydrobromide (CELEXA PO) Take 40 mg by mouth every morning        denosumab (PROLIA) 60 mg/mL Inject 1 mL (60 mg total) under the skin once for 1 dose 1 mL 1    divalproex sodium (DEPAKOTE) 500 mg EC tablet Take 2 tablets (1,000 mg total) by mouth daily every morning for bipolar  0    docosanol (ABREVA) 10 % Apply topically 5 (five) times a day Apply and rub in 5x a day until healed as needed for lesion  0    Docusate Sodium (COLACE PO) Take 100 mg by mouth 2 (two) times a day Hold one day for loose stools   famotidine (PEPCID) 20 mg tablet Take 1 tablet (20 mg total) by mouth daily 30 tablet 1    ferrous sulfate 324 (65 Fe) mg Take 325 mg by mouth 2 (two) times a day   fexofenadine (ALLEGRA) 180 MG tablet Take 180 mg by mouth as needed        hydrocortisone 1 % cream Apply topically to affected area twice daily as needed for rash 30 g 0    levothyroxine (SYNTHROID) 25 mcg tablet Take 1 tablet (25 mcg) by mouth every morning  0    LORazepam (ATIVAN) 0 5 mg tablet Take 0 5 mg by mouth 2 (two) times a day   metFORMIN (GLUCOPHAGE) 1000 MG tablet Take 1 tablet (1,000 mg total) by mouth 2 (two) times a day with meals 60 tablet 2    neomycin-bacitracin-polymyxin b (NEOSPORIN) ointment Apply 1 application topically 2 (two) times a day      nitrofurantoin (MACRODANTIN) 50 mg capsule Take 1 capsule by mouth Bedtime   olopatadine HCl (PATADAY) 0 2 % opth drops Administer 1 drop to both eyes as needed      Oxybutynin Chloride (DITROPAN XL PO) Take 5 mg by mouth 3 (three) times a day       Polyethylene Glycol 3350 (MIRALAX PO) Take 17 g by mouth every morning        simvastatin (ZOCOR) 20 mg tablet Take 1 tablet (20 mg total) by mouth daily at bedtime at 4 pm for hyperlipidemia  0    traZODone (DESYREL) 100 mg tablet Take 1 tablet (100 mg total) by mouth daily at  8 pm for depression  0    Vitamins A & D (VITAMIN A & D) ointment Apply topically as needed for dry skin       No current facility-administered medications for this visit  She has No Known Allergies       Review of Systems   All other systems reviewed and are negative          Objective:      /70 (BP Location: Left arm, Patient Position: Sitting, Cuff Size: Large)   Pulse 78   Temp (!) 95 8 °F (35 4 °C) (Tympanic)   Resp 18   Ht 4' 10" (1 473 m)   Wt 48 5 kg (107 lb)   LMP 11/29/2009 (Exact Date)   BMI 22 36 kg/m²          Physical Exam   Constitutional: She appears well-developed and well-nourished  Eyes: EOM are normal    Cardiovascular: Normal rate, regular rhythm and normal heart sounds  No murmur heard  Pulmonary/Chest: Effort normal  No stridor  No respiratory distress  She has no wheezes  She has no rales  Abdominal: Soft  Musculoskeletal:   Contractures of the upper extremities    Lymphadenopathy:     She has no cervical adenopathy  Skin: Skin is warm  Psychiatric: She has a normal mood and affect  Her behavior is normal    Vitals reviewed

## 2020-01-22 PROBLEM — Z86.39 HISTORY OF VITAMIN D DEFICIENCY: Status: ACTIVE | Noted: 2020-01-22

## 2020-01-22 NOTE — ASSESSMENT & PLAN NOTE
History of osteoporosis with a T-score of -2 9 in the right femoral neck on last DEXA scan in January 30, 2018  Patient currently on calcium and vitamin-D supplementation  She is also receiving Prolia injections  Last calcium was 9 5  Patient is due for another injection in May    Will order BMP prior to and following administration  Given that it has been 2 years, will order repeat imaging  Continue calcium and vitamin-D supplementation

## 2020-01-22 NOTE — ASSESSMENT & PLAN NOTE
Controlled and without symptoms suggestive of hypo or hyperthyroidism  Last TSH in March 2019 was 3 4  Recheck ordered for March  Continue Synthroid 25 mcg daily

## 2020-01-22 NOTE — ASSESSMENT & PLAN NOTE
Lab Results   Component Value Date    HGBA1C 7 3 (H) 12/19/2019     Uncontrolled diabetes without comorbid conditions  Goal A1c less than 7  Fasting blood sugars 100-166  Will continue metformin 1000 mg b i d    Encourage weight control and diabetic diet   UTD on foot exam  Had an upcoming appointment for diabetic eye exam  Urine microalbumin collected within the last year

## 2020-03-04 ENCOUNTER — HOSPITAL ENCOUNTER (OUTPATIENT)
Dept: RADIOLOGY | Age: 60
Discharge: HOME | End: 2020-03-04
Payer: COMMERCIAL

## 2020-03-04 DIAGNOSIS — M81.0 OSTEOPOROSIS, UNSPECIFIED OSTEOPOROSIS TYPE, UNSPECIFIED PATHOLOGICAL FRACTURE PRESENCE: ICD-10-CM

## 2020-03-04 PROCEDURE — 77080 DXA BONE DENSITY AXIAL: CPT

## 2020-03-12 ENCOUNTER — TRANSCRIBE ORDERS (OUTPATIENT)
Dept: LAB | Facility: CLINIC | Age: 60
End: 2020-03-12

## 2020-03-12 ENCOUNTER — APPOINTMENT (OUTPATIENT)
Dept: LAB | Facility: CLINIC | Age: 60
End: 2020-03-12
Payer: COMMERCIAL

## 2020-03-12 DIAGNOSIS — Z79.899 ENCOUNTER FOR LONG-TERM (CURRENT) USE OF OTHER MEDICATIONS: ICD-10-CM

## 2020-03-12 DIAGNOSIS — M81.0 OSTEOPOROSIS, UNSPECIFIED OSTEOPOROSIS TYPE, UNSPECIFIED PATHOLOGICAL FRACTURE PRESENCE: ICD-10-CM

## 2020-03-12 DIAGNOSIS — F31.9 BIPOLAR AFFECTIVE DISORDER, REMISSION STATUS UNSPECIFIED (HCC): ICD-10-CM

## 2020-03-12 DIAGNOSIS — G80.0 SPASTIC QUADRIPLEGIC CEREBRAL PALSY (HCC): ICD-10-CM

## 2020-03-12 DIAGNOSIS — E03.9 HYPOTHYROIDISM, UNSPECIFIED TYPE: ICD-10-CM

## 2020-03-12 DIAGNOSIS — F31.75 DEPRESSED BIPOLAR I DISORDER IN PARTIAL REMISSION (HCC): ICD-10-CM

## 2020-03-12 DIAGNOSIS — E88.89 MADELUNG'S NECK (HCC): ICD-10-CM

## 2020-03-12 DIAGNOSIS — E11.9 TYPE 2 DIABETES MELLITUS WITHOUT COMPLICATION, WITHOUT LONG-TERM CURRENT USE OF INSULIN (HCC): ICD-10-CM

## 2020-03-12 DIAGNOSIS — F31.75 DEPRESSED BIPOLAR I DISORDER IN PARTIAL REMISSION (HCC): Primary | ICD-10-CM

## 2020-03-12 DIAGNOSIS — Z86.39 HISTORY OF VITAMIN D DEFICIENCY: ICD-10-CM

## 2020-03-12 LAB
25(OH)D3 SERPL-MCNC: 84.8 NG/ML (ref 30–100)
ALBUMIN SERPL BCP-MCNC: 3.1 G/DL (ref 3.5–5)
ALP SERPL-CCNC: 45 U/L (ref 46–116)
ALT SERPL W P-5'-P-CCNC: 15 U/L (ref 12–78)
ANION GAP SERPL CALCULATED.3IONS-SCNC: 3 MMOL/L (ref 4–13)
AST SERPL W P-5'-P-CCNC: 17 U/L (ref 5–45)
BASOPHILS # BLD AUTO: 0.09 THOUSANDS/ΜL (ref 0–0.1)
BASOPHILS NFR BLD AUTO: 1 % (ref 0–1)
BILIRUB SERPL-MCNC: 0.19 MG/DL (ref 0.2–1)
BUN SERPL-MCNC: 21 MG/DL (ref 5–25)
CALCIUM SERPL-MCNC: 9.2 MG/DL (ref 8.3–10.1)
CHLORIDE SERPL-SCNC: 105 MMOL/L (ref 100–108)
CHOLEST SERPL-MCNC: 150 MG/DL (ref 50–200)
CK SERPL-CCNC: 54 U/L (ref 26–192)
CO2 SERPL-SCNC: 36 MMOL/L (ref 21–32)
CREAT SERPL-MCNC: 0.98 MG/DL (ref 0.6–1.3)
EOSINOPHIL # BLD AUTO: 0.98 THOUSAND/ΜL (ref 0–0.61)
EOSINOPHIL NFR BLD AUTO: 15 % (ref 0–6)
ERYTHROCYTE [DISTWIDTH] IN BLOOD BY AUTOMATED COUNT: 12.4 % (ref 11.6–15.1)
EST. AVERAGE GLUCOSE BLD GHB EST-MCNC: 143 MG/DL
GFR SERPL CREATININE-BSD FRML MDRD: 64 ML/MIN/1.73SQ M
GLUCOSE P FAST SERPL-MCNC: 143 MG/DL (ref 65–99)
HBA1C MFR BLD: 6.6 %
HCT VFR BLD AUTO: 37.3 % (ref 34.8–46.1)
HDLC SERPL-MCNC: 51 MG/DL
HGB BLD-MCNC: 11.9 G/DL (ref 11.5–15.4)
IMM GRANULOCYTES # BLD AUTO: 0.04 THOUSAND/UL (ref 0–0.2)
IMM GRANULOCYTES NFR BLD AUTO: 1 % (ref 0–2)
LDLC SERPL CALC-MCNC: 77 MG/DL (ref 0–100)
LYMPHOCYTES # BLD AUTO: 2.02 THOUSANDS/ΜL (ref 0.6–4.47)
LYMPHOCYTES NFR BLD AUTO: 31 % (ref 14–44)
MCH RBC QN AUTO: 33.6 PG (ref 26.8–34.3)
MCHC RBC AUTO-ENTMCNC: 31.9 G/DL (ref 31.4–37.4)
MCV RBC AUTO: 105 FL (ref 82–98)
MONOCYTES # BLD AUTO: 0.61 THOUSAND/ΜL (ref 0.17–1.22)
MONOCYTES NFR BLD AUTO: 9 % (ref 4–12)
NEUTROPHILS # BLD AUTO: 2.77 THOUSANDS/ΜL (ref 1.85–7.62)
NEUTS SEG NFR BLD AUTO: 43 % (ref 43–75)
NONHDLC SERPL-MCNC: 99 MG/DL
NRBC BLD AUTO-RTO: 0 /100 WBCS
PLATELET # BLD AUTO: 156 THOUSANDS/UL (ref 149–390)
PMV BLD AUTO: 12.2 FL (ref 8.9–12.7)
POTASSIUM SERPL-SCNC: 4.5 MMOL/L (ref 3.5–5.3)
PROT SERPL-MCNC: 7 G/DL (ref 6.4–8.2)
RBC # BLD AUTO: 3.54 MILLION/UL (ref 3.81–5.12)
SODIUM SERPL-SCNC: 144 MMOL/L (ref 136–145)
TRIGL SERPL-MCNC: 110 MG/DL
TSH SERPL DL<=0.05 MIU/L-ACNC: 2.65 UIU/ML (ref 0.36–3.74)
WBC # BLD AUTO: 6.51 THOUSAND/UL (ref 4.31–10.16)

## 2020-03-12 PROCEDURE — 80165 DIPROPYLACETIC ACID FREE: CPT

## 2020-03-12 PROCEDURE — 36415 COLL VENOUS BLD VENIPUNCTURE: CPT

## 2020-03-12 PROCEDURE — 82550 ASSAY OF CK (CPK): CPT

## 2020-03-12 PROCEDURE — 80061 LIPID PANEL: CPT

## 2020-03-12 PROCEDURE — 84443 ASSAY THYROID STIM HORMONE: CPT

## 2020-03-12 PROCEDURE — 80053 COMPREHEN METABOLIC PANEL: CPT

## 2020-03-12 PROCEDURE — 82306 VITAMIN D 25 HYDROXY: CPT

## 2020-03-12 PROCEDURE — 85025 COMPLETE CBC W/AUTO DIFF WBC: CPT

## 2020-03-12 PROCEDURE — 83036 HEMOGLOBIN GLYCOSYLATED A1C: CPT

## 2020-03-16 LAB — VALPROATE FREE SERPL-MCNC: 6.2 UG/ML (ref 6–22)

## 2020-04-07 ENCOUNTER — TELEMEDICINE (OUTPATIENT)
Dept: FAMILY MEDICINE CLINIC | Facility: CLINIC | Age: 60
End: 2020-04-07

## 2020-04-07 ENCOUNTER — TELEPHONE (OUTPATIENT)
Dept: FAMILY MEDICINE CLINIC | Facility: CLINIC | Age: 60
End: 2020-04-07

## 2020-04-07 DIAGNOSIS — J30.2 SEASONAL ALLERGIES: ICD-10-CM

## 2020-04-07 DIAGNOSIS — E11.9 TYPE 2 DIABETES MELLITUS WITHOUT COMPLICATION, WITHOUT LONG-TERM CURRENT USE OF INSULIN (HCC): ICD-10-CM

## 2020-04-07 DIAGNOSIS — M81.0 OSTEOPOROSIS, UNSPECIFIED OSTEOPOROSIS TYPE, UNSPECIFIED PATHOLOGICAL FRACTURE PRESENCE: Primary | ICD-10-CM

## 2020-04-07 PROBLEM — Z11.1 ENCOUNTER FOR PPD TEST: Status: RESOLVED | Noted: 2019-11-05 | Resolved: 2020-04-07

## 2020-04-07 PROBLEM — Z09 ENCOUNTER FOR EXAMINATION FOLLOWING TREATMENT AT HOSPITAL: Status: RESOLVED | Noted: 2019-04-16 | Resolved: 2020-04-07

## 2020-04-07 PROBLEM — H61.20 CERUMINOSIS: Status: RESOLVED | Noted: 2017-04-21 | Resolved: 2020-04-07

## 2020-04-07 PROCEDURE — 99214 OFFICE O/P EST MOD 30 MIN: CPT | Performed by: FAMILY MEDICINE

## 2020-04-07 RX ORDER — FLUTICASONE PROPIONATE 50 MCG
1 SPRAY, SUSPENSION (ML) NASAL DAILY PRN
Qty: 1 BOTTLE | Refills: 1 | Status: SHIPPED | OUTPATIENT
Start: 2020-04-07

## 2020-04-07 RX ORDER — GUAIFENESIN 600 MG
600 TABLET, EXTENDED RELEASE 12 HR ORAL EVERY 12 HOURS SCHEDULED
Qty: 30 TABLET | Refills: 1 | Status: SHIPPED | OUTPATIENT
Start: 2020-04-07 | End: 2020-04-07 | Stop reason: SDUPTHER

## 2020-04-07 RX ORDER — GUAIFENESIN 600 MG
600 TABLET, EXTENDED RELEASE 12 HR ORAL 2 TIMES DAILY PRN
Qty: 60 TABLET | Refills: 1 | Status: SHIPPED | OUTPATIENT
Start: 2020-04-07 | End: 2020-08-05 | Stop reason: HOSPADM

## 2020-04-09 VITALS
SYSTOLIC BLOOD PRESSURE: 110 MMHG | DIASTOLIC BLOOD PRESSURE: 54 MMHG | HEIGHT: 58 IN | BODY MASS INDEX: 22.36 KG/M2 | RESPIRATION RATE: 16 BRPM | OXYGEN SATURATION: 100 % | TEMPERATURE: 96.1 F | HEART RATE: 77 BPM

## 2020-04-22 DIAGNOSIS — R25.2 SPASTICITY AS LATE EFFECT OF CEREBROVASCULAR ACCIDENT (CVA): ICD-10-CM

## 2020-04-22 DIAGNOSIS — I69.398 SPASTICITY AS LATE EFFECT OF CEREBROVASCULAR ACCIDENT (CVA): ICD-10-CM

## 2020-04-22 RX ORDER — BACLOFEN 10 MG/1
10 TABLET ORAL 3 TIMES DAILY
Qty: 90 TABLET | Refills: 6 | Status: SHIPPED | OUTPATIENT
Start: 2020-04-22 | End: 2020-10-07 | Stop reason: SDUPTHER

## 2020-05-20 ENCOUNTER — APPOINTMENT (OUTPATIENT)
Dept: LAB | Facility: MEDICAL CENTER | Age: 60
End: 2020-05-20
Payer: COMMERCIAL

## 2020-05-20 DIAGNOSIS — M81.0 OSTEOPOROSIS, UNSPECIFIED OSTEOPOROSIS TYPE, UNSPECIFIED PATHOLOGICAL FRACTURE PRESENCE: ICD-10-CM

## 2020-05-20 LAB
ANION GAP SERPL CALCULATED.3IONS-SCNC: 3 MMOL/L (ref 4–13)
BUN SERPL-MCNC: 15 MG/DL (ref 5–25)
CALCIUM SERPL-MCNC: 9.9 MG/DL (ref 8.3–10.1)
CHLORIDE SERPL-SCNC: 104 MMOL/L (ref 100–108)
CO2 SERPL-SCNC: 34 MMOL/L (ref 21–32)
CREAT SERPL-MCNC: 0.94 MG/DL (ref 0.6–1.3)
GFR SERPL CREATININE-BSD FRML MDRD: 68 ML/MIN/1.73SQ M
GLUCOSE P FAST SERPL-MCNC: 72 MG/DL (ref 65–99)
POTASSIUM SERPL-SCNC: 4.9 MMOL/L (ref 3.5–5.3)
SODIUM SERPL-SCNC: 141 MMOL/L (ref 136–145)

## 2020-05-20 PROCEDURE — 80048 BASIC METABOLIC PNL TOTAL CA: CPT

## 2020-05-20 PROCEDURE — 36415 COLL VENOUS BLD VENIPUNCTURE: CPT

## 2020-05-28 ENCOUNTER — TELEPHONE (OUTPATIENT)
Dept: NEUROLOGY | Facility: CLINIC | Age: 60
End: 2020-05-28

## 2020-06-04 ENCOUNTER — CLINICAL SUPPORT (OUTPATIENT)
Dept: FAMILY MEDICINE CLINIC | Facility: CLINIC | Age: 60
End: 2020-06-04

## 2020-06-04 DIAGNOSIS — M81.0 OSTEOPOROSIS, UNSPECIFIED OSTEOPOROSIS TYPE, UNSPECIFIED PATHOLOGICAL FRACTURE PRESENCE: Primary | ICD-10-CM

## 2020-06-04 PROCEDURE — 96372 THER/PROPH/DIAG INJ SC/IM: CPT

## 2020-06-11 ENCOUNTER — TELEMEDICINE (OUTPATIENT)
Dept: FAMILY MEDICINE CLINIC | Facility: CLINIC | Age: 60
End: 2020-06-11

## 2020-06-11 VITALS
WEIGHT: 99 LBS | HEART RATE: 83 BPM | DIASTOLIC BLOOD PRESSURE: 62 MMHG | TEMPERATURE: 97 F | RESPIRATION RATE: 16 BRPM | SYSTOLIC BLOOD PRESSURE: 100 MMHG | BODY MASS INDEX: 20.69 KG/M2

## 2020-06-11 DIAGNOSIS — R23.8 SKIN IRRITATION: ICD-10-CM

## 2020-06-11 DIAGNOSIS — D50.9 IRON DEFICIENCY ANEMIA, UNSPECIFIED IRON DEFICIENCY ANEMIA TYPE: Primary | ICD-10-CM

## 2020-06-11 DIAGNOSIS — M81.0 OSTEOPOROSIS, UNSPECIFIED OSTEOPOROSIS TYPE, UNSPECIFIED PATHOLOGICAL FRACTURE PRESENCE: ICD-10-CM

## 2020-06-11 DIAGNOSIS — F31.9 BIPOLAR AFFECTIVE DISORDER, REMISSION STATUS UNSPECIFIED (HCC): ICD-10-CM

## 2020-06-11 PROCEDURE — 99214 OFFICE O/P EST MOD 30 MIN: CPT | Performed by: FAMILY MEDICINE

## 2020-06-11 RX ORDER — PHENOL 1.4 %
600 AEROSOL, SPRAY (ML) MUCOUS MEMBRANE 2 TIMES DAILY WITH MEALS
Qty: 60 TABLET | Refills: 2 | Status: SHIPPED | OUTPATIENT
Start: 2020-06-11

## 2020-06-11 RX ORDER — DIAPER,BRIEF,INFANT-TODD,DISP
EACH MISCELLANEOUS
Qty: 30 G | Refills: 0
Start: 2020-06-11

## 2020-06-17 ENCOUNTER — APPOINTMENT (OUTPATIENT)
Dept: LAB | Facility: MEDICAL CENTER | Age: 60
End: 2020-06-17
Payer: COMMERCIAL

## 2020-06-17 DIAGNOSIS — M81.0 OSTEOPOROSIS, UNSPECIFIED OSTEOPOROSIS TYPE, UNSPECIFIED PATHOLOGICAL FRACTURE PRESENCE: ICD-10-CM

## 2020-06-17 LAB
ANION GAP SERPL CALCULATED.3IONS-SCNC: 3 MMOL/L (ref 4–13)
BUN SERPL-MCNC: 17 MG/DL (ref 5–25)
CALCIUM SERPL-MCNC: 9.8 MG/DL (ref 8.3–10.1)
CHLORIDE SERPL-SCNC: 100 MMOL/L (ref 100–108)
CO2 SERPL-SCNC: 36 MMOL/L (ref 21–32)
CREAT SERPL-MCNC: 0.96 MG/DL (ref 0.6–1.3)
GFR SERPL CREATININE-BSD FRML MDRD: 65 ML/MIN/1.73SQ M
GLUCOSE P FAST SERPL-MCNC: 93 MG/DL (ref 65–99)
POTASSIUM SERPL-SCNC: 4.3 MMOL/L (ref 3.5–5.3)
SODIUM SERPL-SCNC: 139 MMOL/L (ref 136–145)

## 2020-06-17 PROCEDURE — 36415 COLL VENOUS BLD VENIPUNCTURE: CPT

## 2020-06-17 PROCEDURE — 80048 BASIC METABOLIC PNL TOTAL CA: CPT

## 2020-07-22 ENCOUNTER — TELEPHONE (OUTPATIENT)
Dept: FAMILY MEDICINE CLINIC | Facility: CLINIC | Age: 60
End: 2020-07-22

## 2020-07-22 ENCOUNTER — APPOINTMENT (OUTPATIENT)
Dept: LAB | Age: 60
End: 2020-07-22
Payer: COMMERCIAL

## 2020-07-22 DIAGNOSIS — R39.89 ABNORMAL URINE COLOR: ICD-10-CM

## 2020-07-22 DIAGNOSIS — R39.89 ABNORMAL URINE COLOR: Primary | ICD-10-CM

## 2020-07-22 LAB
BACTERIA UR QL AUTO: ABNORMAL /HPF
BILIRUB UR QL STRIP: NEGATIVE
CLARITY UR: ABNORMAL
COLOR UR: YELLOW
GLUCOSE UR STRIP-MCNC: NEGATIVE MG/DL
HGB UR QL STRIP.AUTO: ABNORMAL
HYALINE CASTS #/AREA URNS LPF: ABNORMAL /LPF
KETONES UR STRIP-MCNC: ABNORMAL MG/DL
LEUKOCYTE ESTERASE UR QL STRIP: ABNORMAL
NITRITE UR QL STRIP: NEGATIVE
NON-SQ EPI CELLS URNS QL MICRO: ABNORMAL /HPF
PH UR STRIP.AUTO: 6.5 [PH]
PROT UR STRIP-MCNC: NEGATIVE MG/DL
RBC #/AREA URNS AUTO: ABNORMAL /HPF
SP GR UR STRIP.AUTO: 1.02 (ref 1–1.03)
UROBILINOGEN UR QL STRIP.AUTO: 0.2 E.U./DL
WBC #/AREA URNS AUTO: ABNORMAL /HPF

## 2020-07-22 PROCEDURE — 87186 SC STD MICRODIL/AGAR DIL: CPT

## 2020-07-22 PROCEDURE — 81001 URINALYSIS AUTO W/SCOPE: CPT | Performed by: FAMILY MEDICINE

## 2020-07-22 PROCEDURE — 87086 URINE CULTURE/COLONY COUNT: CPT

## 2020-07-22 PROCEDURE — 87077 CULTURE AEROBIC IDENTIFY: CPT

## 2020-07-22 PROCEDURE — 87186 SC STD MICRODIL/AGAR DIL: CPT | Performed by: FAMILY MEDICINE

## 2020-07-22 PROCEDURE — 87077 CULTURE AEROBIC IDENTIFY: CPT | Performed by: FAMILY MEDICINE

## 2020-07-22 PROCEDURE — 87086 URINE CULTURE/COLONY COUNT: CPT | Performed by: FAMILY MEDICINE

## 2020-07-22 NOTE — TELEPHONE ENCOUNTER
Called caretaker, she states that for several days patient has been lethargic, urine is cloudy, no fever  She states that typically when patient has these kind of symptoms, it is often related to Urine   Are you able to order Ua with C&S

## 2020-07-22 NOTE — TELEPHONE ENCOUNTER
Order placed for UA and urine culture  Left message on the phone number provided to notify Swati Plascencia of the orders placed  Thanks!

## 2020-07-22 NOTE — TELEPHONE ENCOUNTER
Lisa calling and asking for an order for a urine culture on this patient  Could nurse BHAVESH call her back and let her know when this is in the system?     [phone]

## 2020-07-23 ENCOUNTER — TELEPHONE (OUTPATIENT)
Dept: FAMILY MEDICINE CLINIC | Facility: CLINIC | Age: 60
End: 2020-07-23

## 2020-07-23 DIAGNOSIS — N39.0 URINARY TRACT INFECTION WITHOUT HEMATURIA, SITE UNSPECIFIED: Primary | ICD-10-CM

## 2020-07-23 RX ORDER — CEPHALEXIN 500 MG/1
500 CAPSULE ORAL EVERY 12 HOURS SCHEDULED
Qty: 14 CAPSULE | Refills: 0 | Status: ON HOLD | OUTPATIENT
Start: 2020-07-23 | End: 2020-07-26

## 2020-07-24 LAB
BACTERIA UR CULT: ABNORMAL
BACTERIA UR CULT: ABNORMAL

## 2020-07-26 ENCOUNTER — APPOINTMENT (EMERGENCY)
Dept: CT IMAGING | Facility: HOSPITAL | Age: 60
DRG: 689 | End: 2020-07-26
Payer: COMMERCIAL

## 2020-07-26 ENCOUNTER — APPOINTMENT (EMERGENCY)
Dept: RADIOLOGY | Facility: HOSPITAL | Age: 60
DRG: 689 | End: 2020-07-26
Payer: COMMERCIAL

## 2020-07-26 ENCOUNTER — HOSPITAL ENCOUNTER (INPATIENT)
Facility: HOSPITAL | Age: 60
LOS: 10 days | Discharge: HOME HEALTH | DRG: 689 | End: 2020-08-05
Attending: EMERGENCY MEDICINE | Admitting: INTERNAL MEDICINE
Payer: COMMERCIAL

## 2020-07-26 DIAGNOSIS — N39.0 UTI (URINARY TRACT INFECTION): Primary | ICD-10-CM

## 2020-07-26 DIAGNOSIS — G92.8 TOXIC METABOLIC ENCEPHALOPATHY: ICD-10-CM

## 2020-07-26 DIAGNOSIS — J30.2 SEASONAL ALLERGIES: ICD-10-CM

## 2020-07-26 DIAGNOSIS — G80.9 CEREBRAL PALSY, UNSPECIFIED TYPE (HCC): ICD-10-CM

## 2020-07-26 DIAGNOSIS — F31.9 BIPOLAR AFFECTIVE DISORDER, REMISSION STATUS UNSPECIFIED (HCC): ICD-10-CM

## 2020-07-26 DIAGNOSIS — K59.00 CONSTIPATION, UNSPECIFIED CONSTIPATION TYPE: ICD-10-CM

## 2020-07-26 DIAGNOSIS — R13.12 OROPHARYNGEAL DYSPHAGIA: ICD-10-CM

## 2020-07-26 DIAGNOSIS — R13.10 DYSPHAGIA: ICD-10-CM

## 2020-07-26 DIAGNOSIS — R65.20 SEVERE SEPSIS (HCC): ICD-10-CM

## 2020-07-26 DIAGNOSIS — A41.9 SEVERE SEPSIS (HCC): ICD-10-CM

## 2020-07-26 PROBLEM — R63.0 DECREASED APPETITE: Status: ACTIVE | Noted: 2020-07-26

## 2020-07-26 PROBLEM — N30.00 ACUTE CYSTITIS WITHOUT HEMATURIA: Status: ACTIVE | Noted: 2020-07-26

## 2020-07-26 LAB
ALBUMIN SERPL BCP-MCNC: 3.3 G/DL (ref 3.5–5)
ALP SERPL-CCNC: 76 U/L (ref 46–116)
ALT SERPL W P-5'-P-CCNC: 39 U/L (ref 12–78)
ANION GAP SERPL CALCULATED.3IONS-SCNC: 7 MMOL/L (ref 4–13)
APTT PPP: 27 SECONDS (ref 23–37)
AST SERPL W P-5'-P-CCNC: 24 U/L (ref 5–45)
BACTERIA UR QL AUTO: ABNORMAL /HPF
BASOPHILS # BLD AUTO: 0.03 THOUSANDS/ΜL (ref 0–0.1)
BASOPHILS NFR BLD AUTO: 0 % (ref 0–1)
BILIRUB SERPL-MCNC: 0.33 MG/DL (ref 0.2–1)
BILIRUB UR QL STRIP: NEGATIVE
BUN SERPL-MCNC: 14 MG/DL (ref 5–25)
CALCIUM SERPL-MCNC: 10.1 MG/DL (ref 8.3–10.1)
CHLORIDE SERPL-SCNC: 104 MMOL/L (ref 100–108)
CLARITY UR: CLEAR
CO2 SERPL-SCNC: 33 MMOL/L (ref 21–32)
COLOR UR: YELLOW
CREAT SERPL-MCNC: 1.19 MG/DL (ref 0.6–1.3)
EOSINOPHIL # BLD AUTO: 0.11 THOUSAND/ΜL (ref 0–0.61)
EOSINOPHIL NFR BLD AUTO: 1 % (ref 0–6)
ERYTHROCYTE [DISTWIDTH] IN BLOOD BY AUTOMATED COUNT: 15.1 % (ref 11.6–15.1)
GFR SERPL CREATININE-BSD FRML MDRD: 50 ML/MIN/1.73SQ M
GLUCOSE SERPL-MCNC: 124 MG/DL (ref 65–140)
GLUCOSE SERPL-MCNC: 130 MG/DL (ref 65–140)
GLUCOSE SERPL-MCNC: 135 MG/DL (ref 65–140)
GLUCOSE SERPL-MCNC: 145 MG/DL (ref 65–140)
GLUCOSE UR STRIP-MCNC: NEGATIVE MG/DL
HCT VFR BLD AUTO: 41.6 % (ref 34.8–46.1)
HGB BLD-MCNC: 14.2 G/DL (ref 11.5–15.4)
HGB UR QL STRIP.AUTO: ABNORMAL
IMM GRANULOCYTES # BLD AUTO: 0.04 THOUSAND/UL (ref 0–0.2)
IMM GRANULOCYTES NFR BLD AUTO: 0 % (ref 0–2)
INR PPP: 0.97 (ref 0.84–1.19)
KETONES UR STRIP-MCNC: ABNORMAL MG/DL
LACTATE SERPL-SCNC: 1.5 MMOL/L (ref 0.5–2)
LACTATE SERPL-SCNC: 2.3 MMOL/L (ref 0.5–2)
LACTATE SERPL-SCNC: 4.1 MMOL/L (ref 0.5–2)
LEUKOCYTE ESTERASE UR QL STRIP: ABNORMAL
LIPASE SERPL-CCNC: 164 U/L (ref 73–393)
LYMPHOCYTES # BLD AUTO: 1.82 THOUSANDS/ΜL (ref 0.6–4.47)
LYMPHOCYTES NFR BLD AUTO: 19 % (ref 14–44)
MCH RBC QN AUTO: 35.8 PG (ref 26.8–34.3)
MCHC RBC AUTO-ENTMCNC: 34.1 G/DL (ref 31.4–37.4)
MCV RBC AUTO: 105 FL (ref 82–98)
MONOCYTES # BLD AUTO: 0.73 THOUSAND/ΜL (ref 0.17–1.22)
MONOCYTES NFR BLD AUTO: 8 % (ref 4–12)
NEUTROPHILS # BLD AUTO: 7.04 THOUSANDS/ΜL (ref 1.85–7.62)
NEUTS SEG NFR BLD AUTO: 72 % (ref 43–75)
NITRITE UR QL STRIP: NEGATIVE
NON-SQ EPI CELLS URNS QL MICRO: ABNORMAL /HPF
NRBC BLD AUTO-RTO: 0 /100 WBCS
PH UR STRIP.AUTO: 8 [PH]
PLATELET # BLD AUTO: 156 THOUSANDS/UL (ref 149–390)
PMV BLD AUTO: 11.6 FL (ref 8.9–12.7)
POTASSIUM SERPL-SCNC: 4.3 MMOL/L (ref 3.5–5.3)
PROCALCITONIN SERPL-MCNC: 1.22 NG/ML
PROT SERPL-MCNC: 7.9 G/DL (ref 6.4–8.2)
PROT UR STRIP-MCNC: NEGATIVE MG/DL
PROTHROMBIN TIME: 12.3 SECONDS (ref 11.6–14.5)
RBC # BLD AUTO: 3.97 MILLION/UL (ref 3.81–5.12)
RBC #/AREA URNS AUTO: ABNORMAL /HPF
SARS-COV-2 RNA RESP QL NAA+PROBE: NEGATIVE
SODIUM SERPL-SCNC: 144 MMOL/L (ref 136–145)
SP GR UR STRIP.AUTO: 1.02 (ref 1–1.03)
TROPONIN I SERPL-MCNC: <0.02 NG/ML
UROBILINOGEN UR QL STRIP.AUTO: 0.2 E.U./DL
WBC # BLD AUTO: 9.77 THOUSAND/UL (ref 4.31–10.16)
WBC #/AREA URNS AUTO: ABNORMAL /HPF

## 2020-07-26 PROCEDURE — 87086 URINE CULTURE/COLONY COUNT: CPT | Performed by: EMERGENCY MEDICINE

## 2020-07-26 PROCEDURE — 87077 CULTURE AEROBIC IDENTIFY: CPT | Performed by: EMERGENCY MEDICINE

## 2020-07-26 PROCEDURE — 84145 PROCALCITONIN (PCT): CPT | Performed by: EMERGENCY MEDICINE

## 2020-07-26 PROCEDURE — 80053 COMPREHEN METABOLIC PANEL: CPT | Performed by: EMERGENCY MEDICINE

## 2020-07-26 PROCEDURE — 84484 ASSAY OF TROPONIN QUANT: CPT | Performed by: EMERGENCY MEDICINE

## 2020-07-26 PROCEDURE — 81001 URINALYSIS AUTO W/SCOPE: CPT | Performed by: EMERGENCY MEDICINE

## 2020-07-26 PROCEDURE — 87040 BLOOD CULTURE FOR BACTERIA: CPT | Performed by: EMERGENCY MEDICINE

## 2020-07-26 PROCEDURE — 36415 COLL VENOUS BLD VENIPUNCTURE: CPT | Performed by: EMERGENCY MEDICINE

## 2020-07-26 PROCEDURE — 99285 EMERGENCY DEPT VISIT HI MDM: CPT | Performed by: EMERGENCY MEDICINE

## 2020-07-26 PROCEDURE — 82948 REAGENT STRIP/BLOOD GLUCOSE: CPT

## 2020-07-26 PROCEDURE — 87635 SARS-COV-2 COVID-19 AMP PRB: CPT | Performed by: EMERGENCY MEDICINE

## 2020-07-26 PROCEDURE — 70450 CT HEAD/BRAIN W/O DYE: CPT

## 2020-07-26 PROCEDURE — 87186 SC STD MICRODIL/AGAR DIL: CPT | Performed by: EMERGENCY MEDICINE

## 2020-07-26 PROCEDURE — 99223 1ST HOSP IP/OBS HIGH 75: CPT | Performed by: INTERNAL MEDICINE

## 2020-07-26 PROCEDURE — 83605 ASSAY OF LACTIC ACID: CPT | Performed by: INTERNAL MEDICINE

## 2020-07-26 PROCEDURE — 93005 ELECTROCARDIOGRAM TRACING: CPT

## 2020-07-26 PROCEDURE — 85610 PROTHROMBIN TIME: CPT | Performed by: EMERGENCY MEDICINE

## 2020-07-26 PROCEDURE — 85025 COMPLETE CBC W/AUTO DIFF WBC: CPT | Performed by: EMERGENCY MEDICINE

## 2020-07-26 PROCEDURE — 85730 THROMBOPLASTIN TIME PARTIAL: CPT | Performed by: EMERGENCY MEDICINE

## 2020-07-26 PROCEDURE — 96361 HYDRATE IV INFUSION ADD-ON: CPT

## 2020-07-26 PROCEDURE — 83690 ASSAY OF LIPASE: CPT | Performed by: EMERGENCY MEDICINE

## 2020-07-26 PROCEDURE — 96365 THER/PROPH/DIAG IV INF INIT: CPT

## 2020-07-26 PROCEDURE — 99285 EMERGENCY DEPT VISIT HI MDM: CPT

## 2020-07-26 PROCEDURE — 83605 ASSAY OF LACTIC ACID: CPT | Performed by: EMERGENCY MEDICINE

## 2020-07-26 PROCEDURE — 71045 X-RAY EXAM CHEST 1 VIEW: CPT

## 2020-07-26 RX ORDER — PRAVASTATIN SODIUM 20 MG
20 TABLET ORAL
Status: DISCONTINUED | OUTPATIENT
Start: 2020-07-26 | End: 2020-08-05 | Stop reason: HOSPADM

## 2020-07-26 RX ORDER — FAMOTIDINE 20 MG/1
20 TABLET, FILM COATED ORAL DAILY
Status: DISCONTINUED | OUTPATIENT
Start: 2020-07-27 | End: 2020-08-05 | Stop reason: HOSPADM

## 2020-07-26 RX ORDER — SODIUM CHLORIDE 9 MG/ML
3 INJECTION INTRAVENOUS
Status: DISCONTINUED | OUTPATIENT
Start: 2020-07-26 | End: 2020-08-05 | Stop reason: HOSPADM

## 2020-07-26 RX ORDER — ACETAMINOPHEN 325 MG/1
650 TABLET ORAL EVERY 6 HOURS PRN
Status: DISCONTINUED | OUTPATIENT
Start: 2020-07-26 | End: 2020-08-05 | Stop reason: HOSPADM

## 2020-07-26 RX ORDER — LEVOTHYROXINE SODIUM 0.03 MG/1
25 TABLET ORAL
Status: DISCONTINUED | OUTPATIENT
Start: 2020-07-27 | End: 2020-08-05 | Stop reason: HOSPADM

## 2020-07-26 RX ORDER — PHENOL 1.4 %
600 AEROSOL, SPRAY (ML) MUCOUS MEMBRANE 2 TIMES DAILY WITH MEALS
Status: DISCONTINUED | OUTPATIENT
Start: 2020-07-26 | End: 2020-07-26

## 2020-07-26 RX ORDER — FERROUS SULFATE 325(65) MG
325 TABLET ORAL 2 TIMES DAILY WITH MEALS
Status: COMPLETED | OUTPATIENT
Start: 2020-07-26 | End: 2020-07-27

## 2020-07-26 RX ORDER — ARIPIPRAZOLE 10 MG/1
30 TABLET ORAL
Status: DISCONTINUED | OUTPATIENT
Start: 2020-07-26 | End: 2020-08-05 | Stop reason: HOSPADM

## 2020-07-26 RX ORDER — MELATONIN
2000 DAILY
Status: DISCONTINUED | OUTPATIENT
Start: 2020-07-27 | End: 2020-08-05 | Stop reason: HOSPADM

## 2020-07-26 RX ORDER — POLYETHYLENE GLYCOL 3350 17 G/17G
17 POWDER, FOR SOLUTION ORAL EVERY MORNING
Status: DISCONTINUED | OUTPATIENT
Start: 2020-07-27 | End: 2020-08-05 | Stop reason: HOSPADM

## 2020-07-26 RX ORDER — CITALOPRAM 20 MG/1
40 TABLET ORAL EVERY MORNING
Status: DISCONTINUED | OUTPATIENT
Start: 2020-07-27 | End: 2020-08-05 | Stop reason: HOSPADM

## 2020-07-26 RX ORDER — SODIUM CHLORIDE 9 MG/ML
75 INJECTION, SOLUTION INTRAVENOUS CONTINUOUS
Status: DISCONTINUED | OUTPATIENT
Start: 2020-07-26 | End: 2020-07-26

## 2020-07-26 RX ORDER — FLUTICASONE PROPIONATE 50 MCG
1 SPRAY, SUSPENSION (ML) NASAL DAILY PRN
Status: DISCONTINUED | OUTPATIENT
Start: 2020-07-26 | End: 2020-08-05 | Stop reason: HOSPADM

## 2020-07-26 RX ORDER — ONDANSETRON 2 MG/ML
4 INJECTION INTRAMUSCULAR; INTRAVENOUS EVERY 6 HOURS PRN
Status: DISCONTINUED | OUTPATIENT
Start: 2020-07-26 | End: 2020-08-05 | Stop reason: HOSPADM

## 2020-07-26 RX ORDER — BACLOFEN 10 MG/1
10 TABLET ORAL 3 TIMES DAILY
Status: DISCONTINUED | OUTPATIENT
Start: 2020-07-26 | End: 2020-08-05 | Stop reason: HOSPADM

## 2020-07-26 RX ORDER — DOCUSATE SODIUM 100 MG/1
100 CAPSULE, LIQUID FILLED ORAL 2 TIMES DAILY
Status: DISCONTINUED | OUTPATIENT
Start: 2020-07-26 | End: 2020-08-05 | Stop reason: HOSPADM

## 2020-07-26 RX ORDER — DIVALPROEX SODIUM 500 MG/1
1000 TABLET, DELAYED RELEASE ORAL DAILY
Status: DISCONTINUED | OUTPATIENT
Start: 2020-07-27 | End: 2020-07-31

## 2020-07-26 RX ORDER — GUAIFENESIN 600 MG
600 TABLET, EXTENDED RELEASE 12 HR ORAL 2 TIMES DAILY PRN
Status: DISCONTINUED | OUTPATIENT
Start: 2020-07-26 | End: 2020-08-05 | Stop reason: HOSPADM

## 2020-07-26 RX ORDER — MINERAL OIL AND PETROLATUM 150; 830 MG/G; MG/G
OINTMENT OPHTHALMIC
Status: DISCONTINUED | OUTPATIENT
Start: 2020-07-26 | End: 2020-08-05 | Stop reason: HOSPADM

## 2020-07-26 RX ORDER — SODIUM CHLORIDE, SODIUM GLUCONATE, SODIUM ACETATE, POTASSIUM CHLORIDE, MAGNESIUM CHLORIDE, SODIUM PHOSPHATE, DIBASIC, AND POTASSIUM PHOSPHATE .53; .5; .37; .037; .03; .012; .00082 G/100ML; G/100ML; G/100ML; G/100ML; G/100ML; G/100ML; G/100ML
75 INJECTION, SOLUTION INTRAVENOUS CONTINUOUS
Status: DISCONTINUED | OUTPATIENT
Start: 2020-07-26 | End: 2020-07-27

## 2020-07-26 RX ORDER — CALCIUM CARBONATE 200(500)MG
500 TABLET,CHEWABLE ORAL 2 TIMES DAILY WITH MEALS
Status: DISCONTINUED | OUTPATIENT
Start: 2020-07-26 | End: 2020-08-05 | Stop reason: HOSPADM

## 2020-07-26 RX ORDER — OXYBUTYNIN CHLORIDE 5 MG/1
5 TABLET, EXTENDED RELEASE ORAL 3 TIMES DAILY
Status: DISCONTINUED | OUTPATIENT
Start: 2020-07-26 | End: 2020-08-05 | Stop reason: HOSPADM

## 2020-07-26 RX ADMIN — ARIPIPRAZOLE 30 MG: 10 TABLET ORAL at 22:41

## 2020-07-26 RX ADMIN — BACLOFEN 10 MG: 10 TABLET ORAL at 22:41

## 2020-07-26 RX ADMIN — CALCIUM CARBONATE (ANTACID) CHEW TAB 500 MG 500 MG: 500 CHEW TAB at 18:29

## 2020-07-26 RX ADMIN — OXYBUTYNIN 5 MG: 5 TABLET, FILM COATED, EXTENDED RELEASE ORAL at 22:41

## 2020-07-26 RX ADMIN — DOCUSATE SODIUM 100 MG: 100 CAPSULE, LIQUID FILLED ORAL at 18:29

## 2020-07-26 RX ADMIN — SODIUM CHLORIDE, SODIUM GLUCONATE, SODIUM ACETATE, POTASSIUM CHLORIDE, MAGNESIUM CHLORIDE, SODIUM PHOSPHATE, DIBASIC, AND POTASSIUM PHOSPHATE 75 ML/HR: .53; .5; .37; .037; .03; .012; .00082 INJECTION, SOLUTION INTRAVENOUS at 17:30

## 2020-07-26 RX ADMIN — BACLOFEN 10 MG: 10 TABLET ORAL at 18:29

## 2020-07-26 RX ADMIN — CEFTRIAXONE 1000 MG: 1 INJECTION, POWDER, FOR SOLUTION INTRAMUSCULAR; INTRAVENOUS at 13:02

## 2020-07-26 RX ADMIN — PRAVASTATIN SODIUM 20 MG: 20 TABLET ORAL at 18:29

## 2020-07-26 RX ADMIN — SODIUM CHLORIDE 1000 ML: 0.9 INJECTION, SOLUTION INTRAVENOUS at 13:38

## 2020-07-26 RX ADMIN — SODIUM CHLORIDE 1000 ML: 0.9 INJECTION, SOLUTION INTRAVENOUS at 12:26

## 2020-07-26 RX ADMIN — FERROUS SULFATE TAB 325 MG (65 MG ELEMENTAL FE) 325 MG: 325 (65 FE) TAB at 18:29

## 2020-07-26 RX ADMIN — ENOXAPARIN SODIUM 30 MG: 30 INJECTION SUBCUTANEOUS at 17:00

## 2020-07-26 RX ADMIN — OXYBUTYNIN 5 MG: 5 TABLET, FILM COATED, EXTENDED RELEASE ORAL at 18:29

## 2020-07-26 NOTE — ASSESSMENT & PLAN NOTE
· Patient is afebrile today and does not have leukocytosis  · Lactic acid levels improved today to 1 5 and Procal is 0 56  · Will f/u urine cultures  · Rocephin IV  1000 mg Q 24 hour --> Will transition to PO Ancef tomorrow    · Monitor Vital signs, CBC,

## 2020-07-26 NOTE — SEPSIS NOTE
Sepsis Note   Cherly Goldberg 64 y o  female MRN: [id number]  Unit/Bed#: ED 28 Encounter: [id number]      qSOFA     [address] Name 07/26/20 1128                Altered mental status GCS < 15          Respiratory Rate > / =48  0        Systolic BP < / =055  0        Q Sofa Score  0            Initial Sepsis Screening     Row Name 07/26/20 1308                Is the patient's history suggestive of a new or worsening infection? (!) Yes (Proceed)  -SZ        Suspected source of infection  urinary tract infection  -SZ        Are two or more of the following signs & symptoms of infection both present and new to the patient?         Indicate SIRS criteria  Altered mental status; Tachycardia > 90 bpm  -SZ        If the answer is yes to both questions, suspicion of sepsis is present          If severe sepsis is present AND tissue hypoperfusion perists in the hour after fluid resuscitation or lactate > 4, the patient meets criteria for SEPTIC SHOCK          Are any of the following organ dysfunction criteria present within 6 hours of suspected infection and SIRS criteria that are NOT considered to be chronic conditions? (!) Yes  -SZ        Organ dysfunction  Lactate >/equal 4 0 mmol/L  -SZ        Date of presentation of severe sepsis  07/26/20  -SZ        Time of presentation of severe sepsis  1309  -SZ        Tissue hypoperfusion persists in the hour after crystalloid fluid administration, evidenced, by either:          Was hypotension present within one hour of the conclusion of crystalloid fluid administration?           Date of presentation of septic shock          Time of presentation of septic shock            User Key  (r) = Recorded By, (t) = Taken By, (c) = Cosigned By    [address] Name Provider Type    SZ Shelia Harding MD Physician

## 2020-07-26 NOTE — ASSESSMENT & PLAN NOTE
Lab Results   Component Value Date    HGBA1C 6 6 (H) 03/12/2020       Recent Labs     07/26/20  1144 07/26/20  1609   POCGLU 135 130       Blood Sugar Average: Last 72 hrs:  (P) 132 5     · On metformin at home    Recent hemoglobin A1c in March 6 6  · Hold Metformin,started Sub Q insulin  · Monitor POCT glucose  · Will monitor for Hypoglycemia

## 2020-07-26 NOTE — ED PROVIDER NOTES
History  Chief Complaint   Patient presents with    Weakness - Generalized     being treated for a UTI since 7/23, RN reports decreased oral intake, lethargy     Patient is a 57-year-old female who presents to the emergency department from her group home  Staff member Sienna accompanies her and provides history  Patient has intellectual disability and is minimally verbal at baseline  She seemed her usual self a week ago  Four days ago she was noted to have decreased energy level/not be acting right  In consideration for UTI which has been responsible for similar behavior previously urine testing was performed  This revealed evidence of UTI and she started on cephalexin 3 nights ago-500 mg b i d  She seemed a bit improved on Friday but then yesterday and today worsened  She has had much less energy than usual as well as decrease in appetite  She drank fluids well yesterday though 8 only small mouthfuls of food  Today she would not take her usual 16 oz beverage in the morning  She has not specifically indicated that she was having pain  Upon attempting to eat at time she would be "gagging "  No coughing appreciated  She has not had any fevers  Bowel movements have been passing well/without difficulty  Medical history further significant for diabetes, hypernatremia, hypothyroidism and bipolar disorder  In 2016 while experiencing a UTI she was additionally found to have volvulus and required colon resection and colostomy  She underwent subsequent reversal of this later in the year  Chart briefly reviewed  Patient had urine culture performed on July 22nd which revealed presence of greater than 100,000 CFUs ofEnterobacter aerogenes  This is noted to be resistant to cefazolin though sensitive to ceftriaxone and other agents  Prior to Admission Medications   Prescriptions Last Dose Informant Patient Reported? Taking?    ARIPiprazole (ABILIFY) 30 mg tablet 7/26/2020 at Unknown time [address] [place] [address] No Yes   Sig: Take 1 tablet (30 mg) by mouth daily at 8 pm   Cholecalciferol (VITAMIN D) 2000 UNITS tablet 2020 at Unknown time Care Giver Yes Yes   Sig: Take 2,000 Units by mouth daily  Citalopram Hydrobromide (CELEXA PO) 2020 at Unknown time Care Giver Yes Yes   Sig: Take 40 mg by mouth every morning     Docusate Sodium (COLACE PO) 2020 at Unknown time Care Giver Yes Yes   Sig: Take 100 mg by mouth 2 (two) times a day Hold one day for loose stools  LORazepam (ATIVAN) 0 5 mg tablet 2020 at Unknown time Care Giver Yes Yes   Sig: Take 0 5 mg by mouth 2 (two) times a day       Oxybutynin Chloride (DITROPAN XL PO) 2020 at Unknown time Care Giver Yes Yes   Sig: Take 5 mg by mouth 3 (three) times a day    Polyethylene Glycol 3350 (MIRALAX PO) 2020 at Unknown time Care Giver Yes Yes   Sig: Take 17 g by mouth every morning     Vitamins A & D (VITAMIN A & D) ointment Unknown at Unknown time Care Giver Yes No   Sig: Apply topically as needed for dry skin   acetaminophen (TYLENOL) 325 mg tablet Unknown at Unknown time Care Giver No No   Sig: Take 2 tablets (650 mg total) by mouth every 6 (six) hours as needed (pain, fever greater than 100 4)   baclofen 10 mg tablet 2020 at Unknown time  No Yes   Sig: Take 1 tablet (10 mg total) by mouth 3 (three) times a day   benzonatate (TESSALON PERLES) 100 mg capsule Unknown at Unknown time Care Giver No No   Sig: Take 1 capsule (100 mg total) by mouth every 8 (eight) hours as needed for cough   calcium carbonate (OS-MORRIS) 600 MG tablet 2020 at Unknown time  No Yes   Sig: Take 1 tablet (600 mg total) by mouth 2 (two) times a day with meals   carbamide peroxide (DEBROX) 6 5 % otic solution Unknown at Unknown time Care Giver Yes No   Sig: Administer 2 drops into both ears daily as needed 2 drops in the affected ear prn x 5 days   carboxymethylcellulose (REFRESH TEARS) 0 5 % SOLN Unknown at Unknown time Care Giver Yes No   Si drop 3 (three) times a day as needed for dry eyes   denosumab (PROLIA) 60 mg/mL   No No   Sig: Inject 1 mL (60 mg total) under the skin once for 1 dose   divalproex sodium (DEPAKOTE) 500 mg EC tablet 2020 at Unknown time Care Giver No Yes   Sig: Take 2 tablets (1,000 mg total) by mouth daily every morning for bipolar   docosanol (ABREVA) 10 % Unknown at Unknown time Care Giver No No   Sig: Apply topically 5 (five) times a day Apply and rub in 5x a day until healed as needed for lesion   famotidine (PEPCID) 20 mg tablet 2020 at Unknown time Care Giver No Yes   Sig: Take 1 tablet (20 mg total) by mouth daily   ferrous sulfate 324 (65 Fe) mg 2020 at Unknown time Care Giver Yes Yes   Sig: Take 325 mg by mouth 2 (two) times a day     fexofenadine (ALLEGRA) 180 MG tablet Unknown at Unknown time Care Giver Yes No   Sig: Take 180 mg by mouth as needed     fluticasone (FLONASE) 50 mcg/act nasal spray Unknown at Unknown time  No No   Si spray into each nostril daily as needed for rhinitis or allergies   guaiFENesin (MUCINEX) 600 mg 12 hr tablet Unknown at Unknown time  No No   Sig: Take 1 tablet (600 mg total) by mouth 2 (two) times a day as needed for cough or congestion   hydrocortisone 1 % cream Unknown at Unknown time  No No   Sig: Apply topically to affected area twice daily as needed for rash   levothyroxine (SYNTHROID) 25 mcg tablet 2020 at Unknown time Care Giver No Yes   Sig: Take 1 tablet (25 mcg) by mouth every morning   metFORMIN (GLUCOPHAGE) 1000 MG tablet 2020 at Unknown time Care Giver No Yes   Sig: Take 1 tablet (1,000 mg total) by mouth 2 (two) times a day with meals   neomycin-bacitracin-polymyxin b (NEOSPORIN) ointment Unknown at Unknown time Care Giver Yes No   Sig: Apply 1 application topically 2 (two) times a day as needed   olopatadine HCl (PATADAY) 0 2 % opth drops Unknown at Unknown time Care Giver Yes No   Sig: Administer 1 drop to both eyes as needed   simvastatin (ZOCOR) 20 mg tablet 7/25/2020 at Unknown time Care Giver No Yes   Sig: Take 1 tablet (20 mg total) by mouth daily at bedtime at 4 pm for hyperlipidemia   Patient taking differently: Take 20 mg by mouth daily with dinner at 4 pm for hyperlipidemia   traZODone (DESYREL) 100 mg tablet 7/25/2020 at Unknown time Care Giver No Yes   Sig: Take 1 tablet (100 mg total) by mouth daily at  8 pm for depression      Facility-Administered Medications: None       Past Medical History:   Diagnosis Date    Adjustment disorder     Altered mental status 12/11/2015    Anemia     Bipolar 1 disorder (HCC)     [facility]bral palsy ([facility] )     Chronic hypernatremia 2/6/2016    Closed fracture of left hip ([facility] ) 1/19/2016    Closed left hip fracture (HCC)     no surgery    Constipation     Dehydration 2/20/2016    Diabetes mellitus ([place] [address] )     Disease of thyroid gland     Diverticulosis     Fracture of multiple ribs of right side     Hip fracture ([place] [address] ) 07/26/2015    left    Hyperlipidemia     Hypernatremia 12/28/2018    Hypotension     Impulse control disorder     Incontinence     Intellectual disability due to developmental disorder, unspecified     Microalbuminuria     Osteopathia     Osteoporosis     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[facility] )     Thrombocytopenia ([place] [address] ) 7/31/2015       Past Surgical History:   Procedure Laterality Date    ABDOMINAL SURGERY      COLECTOMY MIN      re-anastomosis 7/22/16    COLONOSCOPY N/A 7/21/2016    Procedure: COLONOSCOPY;  Surgeon: Mallory Durán MD;  [facility]; Service:     COLONOSCOPY N/A 1/31/2016    Procedure: COLONOSCOPY;  Surgeon: Mallory Durán MD;  [facility];  Service:     COLONOSCOPY N/A 7/25/2017    Procedure: COLONOSCOPY;  Surgeon: Mallory Durán MD;  [facility];   Service: Colorectal    COLOSTOMY      EXPLORATORY LAPAROTOMY W/ BOWEL RESECTION N/A 1/31/2016    Procedure: exploratory laparotomy, left sigmoidectomy, coloproctostomy, take down splenic flexure, loop colostomy;  Surgeon: Donte Milton MD;  [facility];  Service:     MS CLOSE ENTEROSTOMY N/A 7/22/2016    Procedure: SEGMENTAL COLECTOMY WITH COLOCOLOSTOMY;  Surgeon: Donte Milton MD;  [facility];  Service: Colorectal       Family History   Problem Relation Age of Onset    Alcohol abuse Mother     Alcohol abuse Father     Diabetes Sister     No Known Problems Sister     No Known Problems Sister      I have reviewed and agree with the history as documented  E-Cigarette/Vaping    E-Cigarette Use Never User      E-Cigarette/Vaping Substances    Nicotine No     THC No     CBD No     Flavoring No     Other No     Unknown No      Social History     Tobacco Use    Smoking status: Never Smoker    Smokeless tobacco: Never Used   Substance Use Topics    Alcohol use: No     Frequency: Never     Binge frequency: Never    Drug use: No       Review of Systems   All other systems reviewed and are negative  Physical Exam  Physical Exam   Constitutional: She appears well-developed  Thin habitus  HENT:   Head: Normocephalic  Lips and oral mucosa slightly dry  Eyes: Pupils are equal, round, and reactive to light  Conjunctivae and EOM are normal    Cardiovascular: Normal rate and regular rhythm  Pulmonary/Chest: Effort normal    Patient takes shallow breaths on pulmonary exam   Crackles appreciated in the right base-difficult to discern whether these are transmitted abdominal sounds for true rales/ rhonchi  Abdominal: Soft  Hyperactive bowel sounds  Abdomen is mildly distended and tympanic  Patient grimaces slightly upon diffuse abdominal palpation  No grimacing or vocalizing of pain upon percussion of CVA region bilaterally  Musculoskeletal: She exhibits no edema or tenderness  Neurological: She is alert  Repeatedly returns to sleep though rouses with loud voice or mild physical stimuli  No appreciated facial asymmetry    Patient with strong bilateral hand    Limited movements of both legs  Skin: Skin is warm and dry  No rash noted  Nursing note and vitals reviewed        Vital Signs  ED Triage Vitals   Temperature Pulse Respirations Blood Pressure SpO2   07/26/20 1129 07/26/20 1128 07/26/20 1128 07/26/20 1128 07/26/20 1128   (!) 97 2 °F (36 2 °C) 95 20 121/73 96 %      Temp Source Heart Rate Source Patient Position - Orthostatic VS BP Location FiO2 (%)   07/26/20 1129 07/26/20 1128 07/26/20 1128 07/26/20 1128 --   Axillary Monitor Lying Right arm       Pain Score       --                  Vitals:    07/26/20 1345 07/26/20 1359 07/26/20 1400 07/26/20 1500   BP:  164/68 117/69 141/64   Pulse: 82 74 76 81   Patient Position - Orthostatic VS:    Lying         Visual Acuity      ED Medications  Medications   sodium chloride (PF) 0 9 % injection 3 mL (has no administration in time range)   enoxaparin (LOVENOX) subcutaneous injection 30 mg (has no administration in time range)   acetaminophen (TYLENOL) tablet 650 mg (has no administration in time range)   ondansetron (ZOFRAN) injection 4 mg (has no administration in time range)   ceftriaxone (ROCEPHIN) 1 g/50 mL in dextrose IVPB (has no administration in time range)   insulin lispro (HumaLOG) 100 units/mL subcutaneous injection 1-5 Units (has no administration in time range)   insulin lispro (HumaLOG) 100 units/mL subcutaneous injection 1-5 Units (has no administration in time range)   baclofen tablet 10 mg (has no administration in time range)   cholecalciferol (VITAMIN D3) tablet 2,000 Units (has no administration in time range)   citalopram (CeleXA) tablet 40 mg (has no administration in time range)   divalproex sodium (DEPAKOTE) EC tablet 1,000 mg (has no administration in time range)   docusate sodium (COLACE) capsule 100 mg (has no administration in time range)   famotidine (PEPCID) tablet 20 mg (has no administration in time range)   ferrous sulfate tablet 325 mg (has no administration in time range) oxybutynin (DITROPAN-XL) 24 hr tablet 5 mg (has no administration in time range)   polyethylene glycol (GLYCOLAX) bowel prep 17 g (has no administration in time range)   pravastatin (PRAVACHOL) tablet 20 mg (has no administration in time range)   ARIPiprazole (ABILIFY) tablet 30 mg (has no administration in time range)   levothyroxine tablet 25 mcg (has no administration in time range)   artificial tear (LUBRIFRESH P M ) ophthalmic ointment (has no administration in time range)   fluticasone (FLONASE) 50 mcg/act nasal spray 1 spray (has no administration in time range)   guaiFENesin (MUCINEX) 12 hr tablet 600 mg (has no administration in time range)   multi-electrolyte (PLASMALYTE-A/ISOLYTE-S PH 7 4) IV solution (has no administration in time range)   calcium carbonate (TUMS) chewable tablet 500 mg (has no administration in time range)   sodium chloride 0 9 % bolus 1,000 mL (0 mL Intravenous Stopped 7/26/20 1359)     Followed by   sodium chloride 0 9 % bolus 1,000 mL (1,000 mL Intravenous New Bag 7/26/20 1338)   ceftriaxone (ROCEPHIN) 1 g/50 mL in dextrose IVPB (0 mg Intravenous Stopped 7/26/20 1320)       Diagnostic Studies  Results Reviewed     Procedure Component Value Units Date/Time    Lactic acid 2 Hours [[id number]] Collected:  07/26/20 1702    Lab Status: In process Specimen:  Blood from Arm, Right Updated:  07/26/20 1705    Procalcitonin [[id number]]  (Abnormal) Collected:  07/26/20 1153    Lab Status:  Final result Specimen:  Blood from Arm, Right Updated:  07/26/20 1625     Procalcitonin 1 22 ng/ml     Novel Coronavirus (Covid-19),PCR [facility] [[id number]]  (Normal) Collected:  07/26/20 1221    Lab Status:  Final result Specimen:  Nares from Nose Updated:  07/26/20 1343     SARS-CoV-2 Negative    Narrative:        The specimen collection materials, transport medium, and/or testing methodology utilized in the production of these test results have been proven to be reliable in a limited validation with an abbreviated program under the Emergency Utilization Authorization provided by the FDA  Testing reported as "Presumptive positive" will be confirmed with secondary testing with a reference laboratory to ensure result accuracy  Clinical caution and judgement should be used with the interpretation of these results with consideration of the clinical impression and other laboratory testing  Testing reported as "Positive" or "Negative" has been proven to be accurate according to standard laboratory validation requirements  All testing is performed with control materials showing appropriate reactivity at standard intervals  Urine Microscopic [[id number]]  (Abnormal) Collected:  07/26/20 1302    Lab Status:  Final result Specimen:  Urine, Straight Cath Updated:  07/26/20 1330     RBC, UA None Seen /hpf      WBC, UA Innumerable /hpf      Epithelial Cells Occasional /hpf      Bacteria, UA Occasional /hpf     Urine culture [[id number]] Collected:  07/26/20 1302    Lab Status: In process Specimen:  Urine, Straight Cath Updated:  07/26/20 1330    UA w Reflex to Microscopic w Reflex to Culture [[id number]]  (Abnormal) Collected:  07/26/20 1302    Lab Status:  Final result Specimen:  Urine, Straight Cath Updated:  07/26/20 1322     Color, UA Yellow     Clarity, UA Clear     Specific [place], UA 1 020     pH, UA 8 0     Leukocytes, UA Large     Nitrite, UA Negative     Protein, UA Negative mg/dl      Glucose, UA Negative mg/dl      Ketones, UA Trace mg/dl      Urobilinogen, UA 0 2 E U /dl      Bilirubin, UA Negative     Blood, UA Small    Lactic Acid [[id number]]  (Abnormal) Collected:  07/26/20 1153    Lab Status:  Final result Specimen:  Blood from Arm, Right Updated:  07/26/20 1255     LACTIC ACID 4 1 mmol/L     Narrative:       Result may be elevated if tourniquet was used during collection  Blood culture #1 [[id number]] Collected:  07/26/20 1237    Lab Status:   In process Specimen:  Blood from Arm, Left Updated: 07/26/20 1245    Protime-INR [[id number]]  (Normal) Collected:  07/26/20 1153    Lab Status:  Final result Specimen:  Blood from Arm, Right Updated:  07/26/20 1225     Protime 12 3 seconds      INR 0 97    APTT [[id number]]  (Normal) Collected:  07/26/20 1153    Lab Status:  Final result Specimen:  Blood from Arm, Right Updated:  07/26/20 1225     PTT 27 seconds     Troponin I [[id number]]  (Normal) Collected:  07/26/20 1153    Lab Status:  Final result Specimen:  Blood from Arm, Right Updated:  07/26/20 1221     Troponin I <0 02 ng/mL     Comprehensive metabolic panel [[id number]]  (Abnormal) Collected:  07/26/20 1153    Lab Status:  Final result Specimen:  Blood from Arm, Right Updated:  07/26/20 1220     Sodium 144 mmol/L      Potassium 4 3 mmol/L      Chloride 104 mmol/L      CO2 33 mmol/L      ANION GAP 7 mmol/L      BUN 14 mg/dL      Creatinine 1 19 mg/dL      Glucose 124 mg/dL      Calcium 10 1 mg/dL      AST 24 U/L      ALT 39 U/L      Alkaline Phosphatase 76 U/L      Total Protein 7 9 g/dL      Albumin 3 3 g/dL      Total Bilirubin 0 33 mg/dL      eGFR 50 ml/min/1 73sq m     Narrative:       [place] guidelines for Chronic Kidney Disease (CKD):     Stage 1 with normal or high GFR (GFR > 90 mL/min/1 73 square meters)    Stage 2 Mild CKD (GFR = 60-89 mL/min/1 73 square meters)    Stage 3A Moderate CKD (GFR = 45-59 mL/min/1 73 square meters)    Stage 3B Moderate CKD (GFR = 30-44 mL/min/1 73 square meters)    Stage 4 Severe CKD (GFR = 15-29 mL/min/1 73 square meters)    Stage 5 End Stage CKD (GFR <15 mL/min/1 73 square meters)  Note: GFR calculation is accurate only with a steady state creatinine    Lipase [[id number]]  (Normal) Collected:  07/26/20 1153    Lab Status:  Final result Specimen:  Blood from Arm, Right Updated:  07/26/20 1220     Lipase 164 u/L     Fingerstick Glucose (POCT) [[id number]]  (Normal) Collected:  07/26/20 1144    Lab Status:  Final result Updated:  07/26/20 1204     POC Glucose 135 mg/dl     CBC and differential [[id number]]  (Abnormal) Collected:  07/26/20 1153    Lab Status:  Final result Specimen:  Blood from Arm, Right Updated:  07/26/20 1202     WBC 9 77 Thousand/uL      RBC 3 97 Million/uL      Hemoglobin 14 2 g/dL      Hematocrit 41 6 %       fL      MCH 35 8 pg      MCHC 34 1 g/dL      RDW 15 1 %      MPV 11 6 fL      Platelets 542 Thousands/uL      nRBC 0 /100 WBCs      Neutrophils Relative 72 %      Immat GRANS % 0 %      Lymphocytes Relative 19 %      Monocytes Relative 8 %      Eosinophils Relative 1 %      Basophils Relative 0 %      Neutrophils Absolute 7 04 Thousands/µL      Immature Grans Absolute 0 04 Thousand/uL      Lymphocytes Absolute 1 82 Thousands/µL      Monocytes Absolute 0 73 Thousand/µL      Eosinophils Absolute 0 11 Thousand/µL      Basophils Absolute 0 03 Thousands/µL     Blood culture #2 [[id number]] Collected:  07/26/20 1153    Lab Status: In process Specimen:  Blood from Arm, Right Updated:  07/26/20 1158                 CT head without contrast   Final Result by Juliana Andrews MD (07/26 1248)      No acute intracranial abnormality  Workstation performed: [id number]         XR chest 1 view portable   ED Interpretation by Dexter Fam MD (07/26 1247)   Unremarkable cardiac silhouette  Clear lungs  Final Result by Ivanna Gonzales MD (07/26 1437)      No acute cardiopulmonary disease  Workstation performed: [id number]                    Procedures  ECG 12 Lead Documentation Only  Date/Time: 7/26/2020 12:50 PM  Performed by: Dexter Fam MD  Authorized by: Dexter Fam MD     ECG reviewed by me, the ED Provider: yes    Patient location:  ED and bedside  Previous ECG:     Previous ECG:  Compared to current    Comparison ECG info:  April 8, 2019-normal sinus rhythm without T-wave inversion appreciated on prior  Wandering artifact noted today    Rate:     ECG rate:  92    ECG rate assessment: normal    Rhythm:     Rhythm: sinus rhythm    Ectopy:     Ectopy: none    QRS:     QRS axis:  Normal    QRS intervals:  Normal  Conduction:     Conduction: normal    ST segments:     ST segments:  Non-specific  T waves:     T waves: inverted      Inverted:  V2    CriticalCare Time  Performed by: Chris Whitmore MD  Authorized by: Chris Whitmore MD     Critical care provider statement:     Critical care time (minutes):  30    Critical care was necessary to treat or prevent imminent or life-threatening deterioration of the following conditions:  Sepsis    Critical care was time spent personally by me on the following activities:  Obtaining history from patient or surrogate, examination of patient, ordering and performing treatments and interventions, ordering and review of laboratory studies, ordering and review of radiographic studies, development of treatment plan with patient or surrogate, discussions with primary provider and re-evaluation of patient's condition             ED Course  ED Course as of Jul 26 1754   Sun Jul 26, 2020   1154 I informed Sienna that I was able to review recent urine culture results  Patient identified to have UTI resistant to cefazolin  Patient decline may be a result of this alone/progressive infection  Ceftriaxone will be ordered  [address] [place] [address] updated on study results and plan for admission  Though patient technically meets severe sepsis criteria heart rate is improved and feel a med-surg bed would be appropriate  US AUDIT      Most Recent Value   Initial Alcohol Screen: US AUDIT-C    1  How often do you have a drink containing alcohol?  0 Filed at: 07/26/2020 1127   2  How many drinks containing alcohol do you have on a typical day you are drinking? 0 Filed at: 07/26/2020 1127   3a  Male UNDER 65: How often do you have five or more drinks on one occasion? 0 Filed at: 07/26/2020 1127   3b   FEMALE Any Age, or MALE 65+: How often do you have 4 or more drinks on one occassion? 0 Filed at: 07/26/2020 1127   Audit-C Score  0 Filed at: 07/26/2020 1127                  AN/DAST-10      Most Recent Value   How many times in the past year have you    Used an illegal drug or used a prescription medication for non-medical reasons? Never Filed at: 07/26/2020 1127              Initial Sepsis Screening     Row Name 07/26/20 1308                Is the patient's history suggestive of a new or worsening infection? (!) Yes (Proceed)  -SZ        Suspected source of infection  urinary tract infection  -SZ        Are two or more of the following signs & symptoms of infection both present and new to the patient?         Indicate SIRS criteria  Altered mental status; Tachycardia > 90 bpm  -SZ        If the answer is yes to both questions, suspicion of sepsis is present          If severe sepsis is present AND tissue hypoperfusion perists in the hour after fluid resuscitation or lactate > 4, the patient meets criteria for SEPTIC SHOCK          Are any of the following organ dysfunction criteria present within 6 hours of suspected infection and SIRS criteria that are NOT considered to be chronic conditions? (!) Yes  -SZ        Organ dysfunction  Lactate >/equal 4 0 mmol/L  -SZ        Date of presentation of severe sepsis  07/26/20  -SZ        Time of presentation of severe sepsis  1309  -SZ        Tissue hypoperfusion persists in the hour after crystalloid fluid administration, evidenced, by either:          Was hypotension present within one hour of the conclusion of crystalloid fluid administration?           Date of presentation of septic shock          Time of presentation of septic shock            User Key  (r) = Recorded By, (t) = Taken By, (c) = Cosigned By    [address] Name Provider Chey Green MD Physician                        MDM      Disposition  Final diagnoses:   UTI (urinary tract infection)   Severe sepsis ([facility] )     Time reflects when diagnosis was documented in both MDM as applicable and the Disposition within this note     Time User Action Codes Description Comment    7/26/2020  1:21 PM Jeremiah AGUILAR Add [N39 0] UTI (urinary tract infection)     7/26/2020  1:21 PM Jeremiah AGUILAR Add [A41 9,  R65 20] Severe sepsis [facility])       ED Disposition     ED Disposition Condition Date/Time Comment    Admit Stable Sun Jul 26, 2020  1:21 PM Case was discussed with Dr Jose Will and the patient's admission status was agreed to be Admission Status: inpatient status to the service of Dr Vanesa Orellana   Follow-up Information    None         Current Discharge Medication List      CONTINUE these medications which have NOT CHANGED    Details   ARIPiprazole (ABILIFY) 30 mg tablet Take 1 tablet (30 mg) by mouth daily at 8 pm  Refills: 0    Associated Diagnoses: Bipolar affective disorder, remission status unspecified ([facility])      baclofen 10 mg tablet Take 1 tablet (10 mg total) by mouth 3 (three) times a day  Qty: 90 tablet, Refills: 6    Associated Diagnoses: Spasticity as late effect of cerebrovascular accident (CVA)      calcium carbonate (OS-MORRIS) 600 MG tablet Take 1 tablet (600 mg total) by mouth 2 (two) times a day with meals  Qty: 60 tablet, Refills: 2    Associated Diagnoses: Osteoporosis, unspecified osteoporosis type, unspecified pathological fracture presence      Cholecalciferol (VITAMIN D) 2000 UNITS tablet Take 2,000 Units by mouth daily  Citalopram Hydrobromide (CELEXA PO) Take 40 mg by mouth every morning        divalproex sodium (DEPAKOTE) 500 mg EC tablet Take 2 tablets (1,000 mg total) by mouth daily every morning for bipolar  Refills: 0    Associated Diagnoses: Bipolar affective disorder, remission status unspecified ([facility])      Docusate Sodium (COLACE PO) Take 100 mg by mouth 2 (two) times a day Hold one day for loose stools         famotidine (PEPCID) 20 mg tablet Take 1 tablet (20 mg total) by mouth daily  Qty: 30 tablet, Refills: 1    Associated Diagnoses: Gastroesophageal reflux disease, esophagitis presence not specified      ferrous sulfate 324 (65 Fe) mg Take 325 mg by mouth 2 (two) times a day  levothyroxine (SYNTHROID) 25 mcg tablet Take 1 tablet (25 mcg) by mouth every morning  Refills: 0    Associated Diagnoses: Hypothyroidism, unspecified type      LORazepam (ATIVAN) 0 5 mg tablet Take 0 5 mg by mouth 2 (two) times a day          metFORMIN (GLUCOPHAGE) 1000 MG tablet Take 1 tablet (1,000 mg total) by mouth 2 (two) times a day with meals  Qty: 60 tablet, Refills: 2    Associated Diagnoses: Type 2 diabetes mellitus without complication, without long-term current use of insulin ([facility])      Oxybutynin Chloride (DITROPAN XL PO) Take 5 mg by mouth 3 (three) times a day       Polyethylene Glycol 3350 (MIRALAX PO) Take 17 g by mouth every morning        simvastatin (ZOCOR) 20 mg tablet Take 1 tablet (20 mg total) by mouth daily at bedtime at 4 pm for hyperlipidemia  Refills: 0    Associated Diagnoses: Hyperlipidemia, unspecified hyperlipidemia type      traZODone (DESYREL) 100 mg tablet Take 1 tablet (100 mg total) by mouth daily at  8 pm for depression  Refills: 0    Associated Diagnoses: Bipolar affective disorder, remission status unspecified ([facility])      acetaminophen (TYLENOL) 325 mg tablet Take 2 tablets (650 mg total) by mouth every 6 (six) hours as needed (pain, fever greater than 100 4)  Qty: 240 tablet, Refills: 0    Associated Diagnoses: Osteoarthritis of both hips, unspecified osteoarthritis type      benzonatate (TESSALON PERLES) 100 mg capsule Take 1 capsule (100 mg total) by mouth every 8 (eight) hours as needed for cough  Qty: 30 capsule, Refills: 0    Associated Diagnoses: Cough      carbamide peroxide (DEBROX) 6 5 % otic solution Administer 2 drops into both ears daily as needed 2 drops in the affected ear prn x 5 days      carboxymethylcellulose (REFRESH TEARS) 0 5 % SOLN 1 drop 3 (three) times a day as needed for dry eyes      denosumab (PROLIA) 60 mg/mL Inject 1 mL (60 mg total) under the skin once for 1 dose  Qty: 1 mL, Refills: 1    Associated Diagnoses: Osteoporosis, unspecified osteoporosis type, unspecified pathological fracture presence      docosanol (ABREVA) 10 % Apply topically 5 (five) times a day Apply and rub in 5x a day until healed as needed for lesion  Refills: 0    Associated Diagnoses: Herpes labialis      fexofenadine (ALLEGRA) 180 MG tablet Take 180 mg by mouth as needed        fluticasone (FLONASE) 50 mcg/act nasal spray 1 spray into each nostril daily as needed for rhinitis or allergies  Qty: 1 Bottle, Refills: 1    Associated Diagnoses: Seasonal allergies      guaiFENesin (MUCINEX) 600 mg 12 hr tablet Take 1 tablet (600 mg total) by mouth 2 (two) times a day as needed for cough or congestion  Qty: 60 tablet, Refills: 1    Associated Diagnoses: Seasonal allergies      hydrocortisone 1 % cream Apply topically to affected area twice daily as needed for rash  Qty: 30 g, Refills: 0    Associated Diagnoses: Skin irritation      neomycin-bacitracin-polymyxin b (NEOSPORIN) ointment Apply 1 application topically 2 (two) times a day as needed      olopatadine HCl (PATADAY) 0 2 % opth drops Administer 1 drop to both eyes as needed      Vitamins A & D (VITAMIN A & D) ointment Apply topically as needed for dry skin           No discharge procedures on file      PDMP Review     None          ED Provider  Electronically Signed by           Kina Lozada MD  07/26/20 3481

## 2020-07-26 NOTE — PLAN OF CARE
Problem: Potential for Falls  Goal: Patient will remain free of falls  Description  INTERVENTIONS:  - Assess patient frequently for physical needs  -  Identify cognitive and physical deficits and behaviors that affect risk of falls    -  [place] fall precautions as indicated by assessment   - Educate patient/family on patient safety including physical limitations  - Instruct patient to call for assistance with activity based on assessment  - Modify environment to reduce risk of injury  - Consider OT/PT consult to assist with strengthening/mobility  Outcome: Progressing     Problem: Prexisting or High Potential for Compromised Skin Integrity  Goal: Skin integrity is maintained or improved  Description  INTERVENTIONS:  - Identify patients at risk for skin breakdown  - Assess and monitor skin integrity  - Assess and monitor nutrition and hydration status  - Monitor labs   - Assess for incontinence   - Turn and reposition patient  - Assist with mobility/ambulation  - Relieve pressure over bony prominences  - Avoid friction and shearing  - Provide appropriate hygiene as needed including keeping skin clean and dry  - Evaluate need for skin moisturizer/barrier cream  - Collaborate with interdisciplinary team   - Patient/family teaching  - Consider wound care consult   Outcome: Progressing     Problem: NEUROSENSORY - ADULT  Goal: Achieves stable or improved neurological status  Description  INTERVENTIONS  - Monitor and report changes in neurological status  - Monitor vital signs such as temperature, blood pressure, glucose, and any other labs ordered   - Initiate measures to prevent increased intracranial pressure  - Monitor for seizure activity and implement precautions if appropriate      Outcome: Progressing  Goal: Achieves maximal functionality and self care  Description  INTERVENTIONS  - Monitor swallowing and airway patency with patient fatigue and changes in neurological status  - Encourage and assist patient to increase activity and self care     - Encourage visually impaired, hearing impaired and aphasic patients to use assistive/communication devices  Outcome: Progressing

## 2020-07-26 NOTE — ASSESSMENT & PLAN NOTE
· Mentates at baseline  · Has muscular rigidity secondary to cerebral palsy  · Frequent repositioning  · Bedside dysphagia assessment

## 2020-07-26 NOTE — H&P
H&P- Michelle Lott 1960, 64 y o  female MRN: [id number]    Unit/Bed#: S -01 Encounter: [id number]    Primary Care Provider: Jami Jorge DO   Date and time admitted to hospital: 7/26/2020 11:21 AM        * Toxic metabolic encephalopathy  Assessment & Plan  · Likely secondary to urinary tract infection  · No electrolyte disturbances  · No recent change in medication  · Chest x-ray-No acute cardiopulmonary disease  · CT head- No acute intracranial abnormality  · No nuchal rigidity, Kernig sign  · Q4 neuro checks  · Hold  On Ativan       Acute cystitis without hematuria  Assessment & Plan  · POA- Met partial sepsis criteria- Tachycardia with altered mental status, no elevated leukocyte count, no tachypnea, no fever  · Lactic acid levels on admission-  4 1  · Received  2l of Normal saline bolus in the ED  · Urine culture- growth of Enterobacter aerogenes- [id number]/ CUF, resistant to cefazolin, cefuroxime, nitrofurantoin  · Discontinued oral Keflex 500mg BID,Started Rocephin IV  1000 mg Q 24 hour  · IV fluids changed to ISolyte  · Monitor Vital signs, CBC, Procal  · Trend lactic acid level *2 hours until levels are less than 2        Type 2 diabetes mellitus, without long-term current use of insulin (HCC)  Assessment & Plan  Lab Results   Component Value Date    HGBA1C 6 6 (H) 03/12/2020       Recent Labs     07/26/20  1144 07/26/20  1609   POCGLU 135 130       Blood Sugar Average: Last 72 hrs:  (P) 132 5     · On metformin at home    Recent hemoglobin A1c in March 6 6  · Hold Metformin,started Sub Q insulin  · Monitor POCT glucose  · Will monitor for Hypoglycemia    Hypothyroidism  Assessment & Plan  · Most recent TSH in March- 2 6  · Is on Synthroid 25mcg  · Will test for TSH    Cerebral palsy (HCC)  Assessment & Plan  · Mentates at baseline  · Has muscular rigidity secondary to cerebral palsy  · Frequent repositioning  · Will consider PT Eval  · Bedside dysphagia assessment    VTE Prophylaxis: Enoxaparin (Lovenox)  / sequential compression device   Code Status:  level 1  POLST: POLST form is not discussed and not completed at this time  Anticipated Length of Stay:  Patient will be admitted on an Inpatient basis with an anticipated length of stay of  greater than 2 midnights  Justification for Hospital Stay:  Toxic metabolic encephalopathy probably secondary to Acute cystitis requiring IV antibiotics    Chief Complaint:   Altered mental status     History of Present Illness:    Kaye Briceño is a 64 y o  female who presents with altered mental status and decreased appetite  Since Friday 07/24/2020  Patient had abdominal pain and decreased appetite since 1 week  After noticing the urine was cloudy, UA was sent from her group facility  Keflex 500 mg b i d  Was started on 07/23/2020  Patient had very low appetite since Friday consuming less than 16 oz of fluid and no solids  Patient did not have fever or abdominal pain on admission  No chills  No vomiting, no palpitations  No hematuria  No flankpain  Patient is not on catheter  History was obtained from patient's caregiver-Lisa, Him  She can be reached at [phone]    Review of Systems:    Review of Systems   Constitutional: Positive for appetite change  Negative for chills, diaphoresis and fever  Respiratory: Negative for apnea, chest tightness and shortness of breath  Cardiovascular: Negative for chest pain, palpitations and leg swelling  Gastrointestinal: Negative for abdominal distention, abdominal pain, nausea and vomiting  Genitourinary: Negative for difficulty urinating, dyspareunia, dysuria, flank pain, hematuria, pelvic pain, vaginal bleeding and vaginal discharge  Musculoskeletal: Negative for neck stiffness     Neurological:        Occasional tremors       Past Medical and Surgical History:     Past Medical History:   Diagnosis Date    Adjustment disorder     Altered mental status 12/11/2015    Anemia     Bipolar 1 disorder (HCC)     Cerebral palsy ([place] [address] )     Chronic hypernatremia 2/6/2016    Closed fracture of left hip ([place] [address] ) 1/19/2016    Closed left hip fracture (HCC)     no surgery    Constipation     Dehydration 2/20/2016    Diabetes mellitus ([place] [address] )     Disease of thyroid gland     Diverticulosis     Fracture of multiple ribs of right side     Hip fracture ([place] [address] ) 07/26/2015    left    Hyperlipidemia     Hypernatremia 12/28/2018    Hypotension     Impulse control disorder     Incontinence     Intellectual disability due to developmental disorder, unspecified     Microalbuminuria     Osteopathia     Osteoporosis     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HCC)     Thrombocytopenia ([place] [address] ) 7/31/2015       Past Surgical History:   Procedure Laterality Date    ABDOMINAL SURGERY      COLECTOMY MIN      re-anastomosis 7/22/16    COLONOSCOPY N/A 7/21/2016    Procedure: COLONOSCOPY;  Surgeon: Jada Pandya MD;  [facility]; Service:     COLONOSCOPY N/A 1/31/2016    Procedure: COLONOSCOPY;  Surgeon: Jada Pandya MD;  [facility];  Service:     COLONOSCOPY N/A 7/25/2017    Procedure: COLONOSCOPY;  Surgeon: Jada Pandya MD;  [facility]; Service: Colorectal    COLOSTOMY      EXPLORATORY LAPAROTOMY W/ BOWEL RESECTION N/A 1/31/2016    Procedure: exploratory laparotomy, left sigmoidectomy, coloproctostomy, take down splenic flexure, loop colostomy;  Surgeon: Jada Pandya MD;  [facility];  Service:     [facility] CLOSE ENTEROSTOMY N/A 7/22/2016    Procedure: SEGMENTAL COLECTOMY WITH COLOCOLOSTOMY;  Surgeon: Jada Pandya MD;  [facility];  Service: Colorectal       Meds/Allergies:    Prior to Admission medications    Medication Sig Start Date End Date Taking?  Authorizing Provider   ARIPiprazole (ABILIFY) 30 mg tablet Take 1 tablet (30 mg) by mouth daily at 8 pm 10/2/18  Yes Allan Taylor MD   baclofen 10 mg tablet Take 1 tablet (10 mg total) by mouth 3 (three) times a day 4/22/20  Yes Neeta Castillo MD   calcium carbonate (OS-MORRIS) 600 MG tablet Take 1 tablet (600 mg total) by mouth 2 (two) times a day with meals 6/11/20  Yes Geoffrey Friedman MD   Cholecalciferol (VITAMIN D) 2000 UNITS tablet Take 2,000 Units by mouth daily  Yes Historical Provider, MD   Citalopram Hydrobromide (CELEXA PO) Take 40 mg by mouth every morning     Yes Historical Provider, MD   divalproex sodium (DEPAKOTE) 500 mg EC tablet Take 2 tablets (1,000 mg total) by mouth daily every morning for bipolar 10/2/18  Yes Chey Bowers MD   Docusate Sodium (COLACE PO) Take 100 mg by mouth 2 (two) times a day Hold one day for loose stools  Yes Historical Provider, MD   famotidine (PEPCID) 20 mg tablet Take 1 tablet (20 mg total) by mouth daily 11/26/19  Yes Geoffrey Friedman MD   ferrous sulfate 324 (65 Fe) mg Take 325 mg by mouth 2 (two) times a day  Yes Historical Provider, MD   levothyroxine (SYNTHROID) 25 mcg tablet Take 1 tablet (25 mcg) by mouth every morning 10/2/18  Yes Chey Bowers MD   LORazepam (ATIVAN) 0 5 mg tablet Take 0 5 mg by mouth 2 (two) times a day  Yes Historical Provider, MD   metFORMIN (GLUCOPHAGE) 1000 MG tablet Take 1 tablet (1,000 mg total) by mouth 2 (two) times a day with meals 12/20/19  Yes Geoffrey Friedman MD   Oxybutynin Chloride (DITROPAN XL PO) Take 5 mg by mouth 3 (three) times a day    Yes Historical Provider, MD   Polyethylene Glycol 3350 (MIRALAX PO) Take 17 g by mouth every morning     Yes Historical Provider, MD   simvastatin (ZOCOR) 20 mg tablet Take 1 tablet (20 mg total) by mouth daily at bedtime at 4 pm for hyperlipidemia  Patient taking differently: Take 20 mg by mouth daily with dinner at 4 pm for hyperlipidemia 10/2/18  Yes Chey Bowers MD   traZODone (DESYREL) 100 mg tablet Take 1 tablet (100 mg total) by mouth daily at  8 pm for depression 10/2/18  Yes Chey Bowers MD   nitrofurantoin (MACRODANTIN) 50 mg capsule Take 1 capsule by mouth Bedtime   4/11/16 7/26/20 Yes Historical Provider, MD   acetaminophen (TYLENOL) 325 mg tablet Take 2 tablets (650 mg total) by mouth every 6 (six) hours as needed (pain, fever greater than 100 4) 6/8/18   Rocio Iniguez DO   benzonatate (TESSALON PERLES) 100 mg capsule Take 1 capsule (100 mg total) by mouth every 8 (eight) hours as needed for cough 12/22/18   Kena Osorio MD   carbamide peroxide (DEBROX) 6 5 % otic solution Administer 2 drops into both ears daily as needed 2 drops in the affected ear prn x 5 days    Historical Provider, MD   carboxymethylcellulose (REFRESH TEARS) 0 5 % SOLN 1 drop 3 (three) times a day as needed for dry eyes    Historical Provider, MD   denosumab (PROLIA) 60 mg/mL Inject 1 mL (60 mg total) under the skin once for 1 dose 4/7/20 4/7/20  Michael Ortega MD   docosanol (ABREVA) 10 % Apply topically 5 (five) times a day Apply and rub in 5x a day until healed as needed for lesion 10/2/18   Herminia Sumner MD   fexofenadine (ALLEGRA) 180 MG tablet Take 180 mg by mouth as needed      Historical Provider, MD   fluticasone (FLONASE) 50 mcg/act nasal spray 1 spray into each nostril daily as needed for rhinitis or allergies 4/7/20   Michael Oretga MD   guaiFENesin (MUCINEX) 600 mg 12 hr tablet Take 1 tablet (600 mg total) by mouth 2 (two) times a day as needed for cough or congestion 4/7/20   Michael Ortega MD   hydrocortisone 1 % cream Apply topically to affected area twice daily as needed for rash 6/11/20   Michael Ortega MD   neomycin-bacitracin-polymyxin b (NEOSPORIN) ointment Apply 1 application topically 2 (two) times a day as needed    Historical Provider, MD   olopatadine HCl (PATADAY) 0 2 % opth drops Administer 1 drop to both eyes as needed    Historical Provider, MD   Vitamins A & D (VITAMIN A & D) ointment Apply topically as needed for dry skin    Historical Provider, MD   cephalexin (KEFLEX) 500 mg capsule Take 1 capsule (500 mg total) by mouth every 12 (twelve) hours for 7 days 7/23/20 7/26/20  Kimmy Marte Monika,      I have reveiwed home medications using records provided by SNF  I have reviewed medications the patient's caregiver Lisa    Allergies: No Known Allergies    Social History:     Marital Status: Single   Occupation: None  Patient Pre-hospital Living Situation: Living at [address] [place] [address] language and developmental disabilities  Patient Pre-hospital Level of Mobility: Mostly bed ridden  Patient Pre-hospital Diet Restrictions: None  Substance Use History:   Social History     Substance and Sexual Activity   Alcohol Use No    Frequency: Never    Binge frequency: Never     Social History     Tobacco Use   Smoking Status Never Smoker   Smokeless Tobacco Never Used     Social History     Substance and Sexual Activity   Drug Use No       Family History:    non-contributory    Physical Exam:     Vitals:   Blood Pressure: 141/64 (07/26/20 1500)  Pulse: 81 (07/26/20 1500)  Temperature: 97 6 °F (36 4 °C) (07/26/20 1500)  Temp Source: Axillary (07/26/20 1500)  Respirations: 18 (07/26/20 1500)  Height: 4' 10" (147 3 cm) (07/26/20 1125)  Weight - Scale: 43 9 kg (96 lb 12 5 oz) (07/26/20 1125)  SpO2: 97 % (07/26/20 1500)    Physical Exam  General appearance-well-built, well nourished,  Head- normocephalic and atraumatic  HEENT normal  Respiratory-normal effort, breath sounds normal, no rales, no wheeze  Cardiac-normal heart sounds, no murmurs, no abnormal sounds  Abdomen-soft  Bowel sounds are normal   no distension, no rigidity, no guarding, no tenderness  Musculoskeletal- no flank pain, occasional tremors, rigidity+,   CNS- No neck stiffness, no sensory, motor abnormalities  Skin- skin is warm and dry  Capillary refill takes less than 2 seconds  Additional Data:     Lab Results: I have personally reviewed pertinent reports        Results from last 7 days   Lab Units 07/26/20  1153   WBC Thousand/uL 9 77   HEMOGLOBIN g/dL 14 2   HEMATOCRIT % 41 6   PLATELETS Thousands/uL 156 NEUTROS PCT % 72   LYMPHS PCT % 19   MONOS PCT % 8   EOS PCT % 1     Results from last 7 days   Lab Units 07/26/20  1153   POTASSIUM mmol/L 4 3   CHLORIDE mmol/L 104   CO2 mmol/L 33*   BUN mg/dL 14   CREATININE mg/dL 1 19   CALCIUM mg/dL 10 1   ALK PHOS U/L 76   ALT U/L 39   AST U/L 24     Results from last 7 days   Lab Units 07/26/20  1153   INR  0 97       Imaging: I have personally reviewed pertinent reports  Xr Chest 1 View Portable    Result Date: 7/26/2020  Narrative: CHEST INDICATION:   Altered mentation, recent decrease and difficulty taking in fluids/foods  COMPARISON:  4/8/2019 EXAM PERFORMED/VIEWS:  XR CHEST PORTABLE FINDINGS: Cardiomediastinal silhouette appears unremarkable  The lungs are clear  No pneumothorax or pleural effusion  Osseous structures appear within normal limits for patient age  Impression: No acute cardiopulmonary disease  Workstation performed: [id number]     Ct Head Without Contrast    Result Date: 7/26/2020  Narrative: CT BRAIN - WITHOUT CONTRAST INDICATION:   Altered mentation  Decreased oral intake; lethargy COMPARISON:  10/22/2019 TECHNIQUE:  CT examination of the brain was performed  In addition to axial images, coronal 2D reformatted images were created and submitted for interpretation  Radiation dose length product (DLP) for this visit:  890 35 mGy-cm   This examination, like all CT scans performed in the [facility], was performed utilizing techniques to minimize radiation dose exposure, including the use of iterative  reconstruction and automated exposure control  IMAGE QUALITY:  Diagnostic  FINDINGS: PARENCHYMA:  No intracranial mass, mass effect or midline shift  No CT signs of acute infarction  No acute parenchymal hemorrhage  Small areas of diminished white matter attenuation is consistent with microvascular ischemic change  VENTRICLES AND EXTRA-AXIAL SPACES:  Ventricular and sulcal prominence is proportionate   VISUALIZED ORBITS AND PARANASAL SINUSES:  Unremarkable  CALVARIUM AND EXTRACRANIAL SOFT TISSUES:  Hyperostosis frontalis is present  Impression: No acute intracranial abnormality  Workstation performed: [id number]       EKG, Pathology, and Other Studies Reviewed on Admission:   · EKG:  Reviewed    Epic / Care Everywhere Records Reviewed: Yes     ** Please Note: This note has been constructed using a voice recognition system   **

## 2020-07-26 NOTE — ASSESSMENT & PLAN NOTE
· Likely secondary to urinary tract infection  · No recent change in medication  · Chest x-ray-No acute cardiopulmonary disease  · CT head- No acute intracranial abnormality    · Q4 neuro checks  · Will restart Ativan and Trazodone today

## 2020-07-27 LAB
ANION GAP SERPL CALCULATED.3IONS-SCNC: 2 MMOL/L (ref 4–13)
BASOPHILS # BLD AUTO: 0.02 THOUSANDS/ΜL (ref 0–0.1)
BASOPHILS NFR BLD AUTO: 0 % (ref 0–1)
BUN SERPL-MCNC: 12 MG/DL (ref 5–25)
CALCIUM SERPL-MCNC: 7.9 MG/DL (ref 8.3–10.1)
CHLORIDE SERPL-SCNC: 109 MMOL/L (ref 100–108)
CO2 SERPL-SCNC: 35 MMOL/L (ref 21–32)
CREAT SERPL-MCNC: 0.92 MG/DL (ref 0.6–1.3)
EOSINOPHIL # BLD AUTO: 0.09 THOUSAND/ΜL (ref 0–0.61)
EOSINOPHIL NFR BLD AUTO: 1 % (ref 0–6)
ERYTHROCYTE [DISTWIDTH] IN BLOOD BY AUTOMATED COUNT: 15.1 % (ref 11.6–15.1)
GFR SERPL CREATININE-BSD FRML MDRD: 69 ML/MIN/1.73SQ M
GLUCOSE SERPL-MCNC: 114 MG/DL (ref 65–140)
GLUCOSE SERPL-MCNC: 116 MG/DL (ref 65–140)
GLUCOSE SERPL-MCNC: 120 MG/DL (ref 65–140)
GLUCOSE SERPL-MCNC: 159 MG/DL (ref 65–140)
GLUCOSE SERPL-MCNC: 301 MG/DL (ref 65–140)
HCT VFR BLD AUTO: 34.4 % (ref 34.8–46.1)
HGB BLD-MCNC: 11.8 G/DL (ref 11.5–15.4)
IMM GRANULOCYTES # BLD AUTO: 0.03 THOUSAND/UL (ref 0–0.2)
IMM GRANULOCYTES NFR BLD AUTO: 0 % (ref 0–2)
LYMPHOCYTES # BLD AUTO: 2.24 THOUSANDS/ΜL (ref 0.6–4.47)
LYMPHOCYTES NFR BLD AUTO: 33 % (ref 14–44)
MCH RBC QN AUTO: 36.1 PG (ref 26.8–34.3)
MCHC RBC AUTO-ENTMCNC: 34.3 G/DL (ref 31.4–37.4)
MCV RBC AUTO: 105 FL (ref 82–98)
MONOCYTES # BLD AUTO: 0.58 THOUSAND/ΜL (ref 0.17–1.22)
MONOCYTES NFR BLD AUTO: 9 % (ref 4–12)
NEUTROPHILS # BLD AUTO: 3.81 THOUSANDS/ΜL (ref 1.85–7.62)
NEUTS SEG NFR BLD AUTO: 57 % (ref 43–75)
NRBC BLD AUTO-RTO: 0 /100 WBCS
PLATELET # BLD AUTO: 126 THOUSANDS/UL (ref 149–390)
PMV BLD AUTO: 12.1 FL (ref 8.9–12.7)
POTASSIUM SERPL-SCNC: 4.5 MMOL/L (ref 3.5–5.3)
PROCALCITONIN SERPL-MCNC: 0.56 NG/ML
RBC # BLD AUTO: 3.27 MILLION/UL (ref 3.81–5.12)
SODIUM SERPL-SCNC: 146 MMOL/L (ref 136–145)
WBC # BLD AUTO: 6.77 THOUSAND/UL (ref 4.31–10.16)

## 2020-07-27 PROCEDURE — 82948 REAGENT STRIP/BLOOD GLUCOSE: CPT

## 2020-07-27 PROCEDURE — 85025 COMPLETE CBC W/AUTO DIFF WBC: CPT | Performed by: INTERNAL MEDICINE

## 2020-07-27 PROCEDURE — 99232 SBSQ HOSP IP/OBS MODERATE 35: CPT | Performed by: INTERNAL MEDICINE

## 2020-07-27 PROCEDURE — 80048 BASIC METABOLIC PNL TOTAL CA: CPT | Performed by: INTERNAL MEDICINE

## 2020-07-27 RX ORDER — CEFDINIR 300 MG/1
300 CAPSULE ORAL EVERY 12 HOURS SCHEDULED
Status: DISCONTINUED | OUTPATIENT
Start: 2020-07-28 | End: 2020-07-29

## 2020-07-27 RX ORDER — LORAZEPAM 0.5 MG/1
0.5 TABLET ORAL 2 TIMES DAILY
Status: DISCONTINUED | OUTPATIENT
Start: 2020-07-27 | End: 2020-07-28

## 2020-07-27 RX ORDER — TRAZODONE HYDROCHLORIDE 100 MG/1
100 TABLET ORAL
Status: DISCONTINUED | OUTPATIENT
Start: 2020-07-27 | End: 2020-07-28

## 2020-07-27 RX ORDER — SODIUM CHLORIDE 450 MG/100ML
75 INJECTION, SOLUTION INTRAVENOUS CONTINUOUS
Status: DISCONTINUED | OUTPATIENT
Start: 2020-07-27 | End: 2020-07-28

## 2020-07-27 RX ADMIN — ENOXAPARIN SODIUM 30 MG: 30 INJECTION SUBCUTANEOUS at 08:28

## 2020-07-27 RX ADMIN — BACLOFEN 10 MG: 10 TABLET ORAL at 08:29

## 2020-07-27 RX ADMIN — TRAZODONE HYDROCHLORIDE 100 MG: 100 TABLET ORAL at 21:47

## 2020-07-27 RX ADMIN — CALCIUM CARBONATE (ANTACID) CHEW TAB 500 MG 500 MG: 500 CHEW TAB at 18:07

## 2020-07-27 RX ADMIN — DOCUSATE SODIUM 100 MG: 100 CAPSULE, LIQUID FILLED ORAL at 18:07

## 2020-07-27 RX ADMIN — ARIPIPRAZOLE 30 MG: 10 TABLET ORAL at 21:48

## 2020-07-27 RX ADMIN — OXYBUTYNIN 5 MG: 5 TABLET, FILM COATED, EXTENDED RELEASE ORAL at 21:47

## 2020-07-27 RX ADMIN — CITALOPRAM HYDROBROMIDE 40 MG: 20 TABLET ORAL at 08:29

## 2020-07-27 RX ADMIN — CALCIUM CARBONATE (ANTACID) CHEW TAB 500 MG 500 MG: 500 CHEW TAB at 08:29

## 2020-07-27 RX ADMIN — OXYBUTYNIN 5 MG: 5 TABLET, FILM COATED, EXTENDED RELEASE ORAL at 18:07

## 2020-07-27 RX ADMIN — INSULIN LISPRO 1 UNITS: 100 INJECTION, SOLUTION INTRAVENOUS; SUBCUTANEOUS at 11:51

## 2020-07-27 RX ADMIN — INSULIN LISPRO 3 UNITS: 100 INJECTION, SOLUTION INTRAVENOUS; SUBCUTANEOUS at 21:48

## 2020-07-27 RX ADMIN — BACLOFEN 10 MG: 10 TABLET ORAL at 21:47

## 2020-07-27 RX ADMIN — OXYBUTYNIN 5 MG: 5 TABLET, FILM COATED, EXTENDED RELEASE ORAL at 08:29

## 2020-07-27 RX ADMIN — DOCUSATE SODIUM 100 MG: 100 CAPSULE, LIQUID FILLED ORAL at 08:29

## 2020-07-27 RX ADMIN — SODIUM CHLORIDE, SODIUM GLUCONATE, SODIUM ACETATE, POTASSIUM CHLORIDE, MAGNESIUM CHLORIDE, SODIUM PHOSPHATE, DIBASIC, AND POTASSIUM PHOSPHATE 75 ML/HR: .53; .5; .37; .037; .03; .012; .00082 INJECTION, SOLUTION INTRAVENOUS at 06:24

## 2020-07-27 RX ADMIN — POLYETHYLENE GLYCOL 3350 17 G: 17 POWDER, FOR SOLUTION ORAL at 08:30

## 2020-07-27 RX ADMIN — CHOLECALCIFEROL TAB 25 MCG (1000 UNIT) 2000 UNITS: 25 TAB at 08:29

## 2020-07-27 RX ADMIN — CEFTRIAXONE SODIUM 1000 MG: 10 INJECTION, POWDER, FOR SOLUTION INTRAVENOUS at 12:44

## 2020-07-27 RX ADMIN — FERROUS SULFATE TAB 325 MG (65 MG ELEMENTAL FE) 325 MG: 325 (65 FE) TAB at 08:29

## 2020-07-27 RX ADMIN — LEVOTHYROXINE SODIUM 25 MCG: 25 TABLET ORAL at 06:19

## 2020-07-27 RX ADMIN — LORAZEPAM 0.5 MG: 0.5 TABLET ORAL at 12:00

## 2020-07-27 RX ADMIN — LORAZEPAM 0.5 MG: 0.5 TABLET ORAL at 18:07

## 2020-07-27 RX ADMIN — PRAVASTATIN SODIUM 20 MG: 20 TABLET ORAL at 18:07

## 2020-07-27 RX ADMIN — DIVALPROEX SODIUM 1000 MG: 500 TABLET, DELAYED RELEASE ORAL at 08:29

## 2020-07-27 RX ADMIN — BACLOFEN 10 MG: 10 TABLET ORAL at 18:07

## 2020-07-27 RX ADMIN — SODIUM CHLORIDE 75 ML/HR: 0.45 INJECTION, SOLUTION INTRAVENOUS at 11:51

## 2020-07-27 RX ADMIN — FAMOTIDINE 20 MG: 20 TABLET, FILM COATED ORAL at 08:29

## 2020-07-27 NOTE — PLAN OF CARE
Problem: Potential for Falls  Goal: Patient will remain free of falls  Description  INTERVENTIONS:  - Assess patient frequently for physical needs  -  Identify cognitive and physical deficits and behaviors that affect risk of falls    -  [place] fall precautions as indicated by assessment   - Educate patient/family on patient safety including physical limitations  - Instruct patient to call for assistance with activity based on assessment  - Modify environment to reduce risk of injury  - Consider OT/PT consult to assist with strengthening/mobility  Outcome: Progressing     Problem: Prexisting or High Potential for Compromised Skin Integrity  Goal: Skin integrity is maintained or improved  Description  INTERVENTIONS:  - Identify patients at risk for skin breakdown  - Assess and monitor skin integrity  - Assess and monitor nutrition and hydration status  - Monitor labs   - Assess for incontinence   - Turn and reposition patient  - Assist with mobility/ambulation  - Relieve pressure over bony prominences  - Avoid friction and shearing  - Provide appropriate hygiene as needed including keeping skin clean and dry  - Evaluate need for skin moisturizer/barrier cream  - Collaborate with interdisciplinary team   - Patient/family teaching  - Consider wound care consult   Outcome: Progressing     Problem: NEUROSENSORY - ADULT  Goal: Achieves stable or improved neurological status  Description  INTERVENTIONS  - Monitor and report changes in neurological status  - Monitor vital signs such as temperature, blood pressure, glucose, and any other labs ordered   - Initiate measures to prevent increased intracranial pressure  - Monitor for seizure activity and implement precautions if appropriate      Outcome: Progressing  Goal: Achieves maximal functionality and self care  Description  INTERVENTIONS  - Monitor swallowing and airway patency with patient fatigue and changes in neurological status  - Encourage and assist patient to increase activity and self care     - Encourage visually impaired, hearing impaired and aphasic patients to use assistive/communication devices  Outcome: Progressing

## 2020-07-27 NOTE — SOCIAL WORK
PT IS NOT A READMISSION OR A IDENTIFIED BUNDLE  Pt resides at [facility], [phone] or [phone]  Pt uses a roller walker or a wheelchair in the community and requires assistance with ADLs  Pt has a court appointed guardian Gogo Los  Pt has no hx of [facility] services but was at a SNF in the past  Group home to transport upon d/c     CM reviewed d/c planning process including the following: identifying help at home, patient preference for d/c planning needs, Discharge Lounge, [facility] Meds to Bed program, availability of treatment team to discuss questions or concerns patient and/or family may have regarding understanding medications and recognizing signs and symptoms once discharged  CM also encouraged patient to follow up with all recommended appointments after discharge  Patient advised of importance for patient and family to participate in managing patients medical well being

## 2020-07-27 NOTE — PLAN OF CARE
Problem: Potential for Falls  Goal: Patient will remain free of falls  Description  INTERVENTIONS:  - Assess patient frequently for physical needs  -  Identify cognitive and physical deficits and behaviors that affect risk of falls    -  [place] fall precautions as indicated by assessment   - Educate patient/family on patient safety including physical limitations  - Instruct patient to call for assistance with activity based on assessment  - Modify environment to reduce risk of injury  - Consider OT/PT consult to assist with strengthening/mobility  Outcome: Progressing     Problem: Prexisting or High Potential for Compromised Skin Integrity  Goal: Skin integrity is maintained or improved  Description  INTERVENTIONS:  - Identify patients at risk for skin breakdown  - Assess and monitor skin integrity  - Assess and monitor nutrition and hydration status  - Monitor labs   - Assess for incontinence   - Turn and reposition patient  - Assist with mobility/ambulation  - Relieve pressure over bony prominences  - Avoid friction and shearing  - Provide appropriate hygiene as needed including keeping skin clean and dry  - Evaluate need for skin moisturizer/barrier cream  - Collaborate with interdisciplinary team   - Patient/family teaching  - Consider wound care consult   Outcome: Progressing     Problem: NEUROSENSORY - ADULT  Goal: Achieves stable or improved neurological status  Description  INTERVENTIONS  - Monitor and report changes in neurological status  - Monitor vital signs such as temperature, blood pressure, glucose, and any other labs ordered   - Initiate measures to prevent increased intracranial pressure  - Monitor for seizure activity and implement precautions if appropriate      Outcome: Progressing  Goal: Achieves maximal functionality and self care  Description  INTERVENTIONS  - Monitor swallowing and airway patency with patient fatigue and changes in neurological status  - Encourage and assist patient to increase activity and self care     - Encourage visually impaired, hearing impaired and aphasic patients to use assistive/communication devices  Outcome: Progressing

## 2020-07-27 NOTE — UTILIZATION REVIEW
Notification of Inpatient Admission/Inpatient Authorization Request   This is a Notification of Inpatient Admission for [address]  Be advised that this patient was admitted to our facility under Inpatient Status  Contact Juliet Handley at [phone] for additional admission information  [facility] DEPT  DEDICATED [phone]  Patient Name:   Carri Meade   YOB: 1960       [address]   [address]:   Kamla Demarco  Tax ID: [id number]  NPI: [id number] Attending Provider/NPI:  Address:  Phone: Dorian Arriola, Rach Le [[id number]]  Same as the facility  [phone]   Place of Service Code: 24 Place of Service Name:  [address] [place] [address]   Start Date: 7/26/20 1339     Discharge Date & Time: No discharge date for patient encounter  Type of Admission: Inpatient Status Discharge Disposition   (if discharged): Home/Self Care   Patient Diagnoses: UTI (urinary tract infection) [N39 0]  Severe sepsis ([address] ) [A41 9, R65 20]  AMS (altered mental status) [R41 82]     Orders: Admission Orders (From admission, onward)     Ordered        07/26/20 1339  Inpatient Admission (expected length of stay for this patient Order details is greater than two midnights)  Once                    Assigned Utilization Review Contact: Juliet Handley  Utilization   Network Utilization Review Department  Phone: [phone]; Fax [phone]  Email: [email or web address]   ATTENTION PAYERS: Please call the assigned Utilization  directly with any questions or concerns ALL voicemails in the department are confidential  Send all requests for admission clinical reviews, approved or denied determinations and any other requests to dedicated fax number belonging to the campus where the patient is receiving treatment

## 2020-07-27 NOTE — ASSESSMENT & PLAN NOTE
Patient has Na level of 146 today  The group facility she is from states that patient tends to become hyponatremic      -Discontinue NS and switched to 1/2NS  -Monitor Na levels and switch to D5W tomorrow

## 2020-07-27 NOTE — PROGRESS NOTES
Progress Note - Erika French 1960, 64 y o  female MRN: [id number]    Unit/Bed#: S -01 Encounter: [id number]    Primary Care Provider: Angel Terry DO   Date and time admitted to hospital: 7/26/2020 11:21 AM        Acute cystitis without hematuria  Assessment & Plan  · POA- Met partial sepsis criteria- Tachycardia with altered mental status, no elevated leukocyte count, no tachypnea, no fever  · Lactic acid levels on admission-  4 1  · Received  2l of Normal saline bolus in the ED  · Urine culture- growth of Enterobacter aerogenes- 255345/ CUF, resistant to cefazolin, cefuroxime, nitrofurantoin  · Discontinued oral Keflex 500mg BID,Started Rocephin IV  1000 mg Q 24 hour  · IV fluids changed to ISolyte  · Monitor Vital signs, CBC, Procal  · Trend lactic acid level *2 hours until levels are less than 2        Hypothyroidism  Assessment & Plan  · Most recent TSH in March- 2 6  · Is on Synthroid 25mcg  · Will test for TSH    Type 2 diabetes mellitus, without long-term current use of insulin (HCC)  Assessment & Plan  Lab Results   Component Value Date    HGBA1C 6 6 (H) 03/12/2020       Recent Labs     07/26/20  1144 07/26/20  1609   POCGLU 135 130       Blood Sugar Average: Last 72 hrs:  (P) 132 5     · On metformin at home  Recent hemoglobin A1c in March 6 6  · Hold Metformin,started Sub Q insulin  · Monitor POCT glucose  · Will monitor for Hypoglycemia    Cerebral palsy (HCC)  Assessment & Plan  · Mentates at baseline  · Has muscular rigidity secondary to cerebral palsy  · Frequent repositioning  · Will consider PT Eval  · Bedside dysphagia assessment    * Toxic metabolic encephalopathy  Assessment & Plan  · Likely secondary to urinary tract infection  · No electrolyte disturbances  · No recent change in medication  · Chest x-ray-No acute cardiopulmonary disease  · CT head- No acute intracranial abnormality    · No nuchal rigidity, Kernig sign  · Q4 neuro checks  · Hold  On Ativan             VTE Pharmacologic Prophylaxis:   Pharmacologic: Enoxaparin (Lovenox)  Mechanical VTE Prophylaxis in Place: Yes    Discussions with Specialists or Other Care Team Provider:     Education and Discussions with Family / Patient: Discussed with patient's care taker, Yvonne Richards  Current Length of Stay: 1 day(s)    Current Patient Status: Inpatient     Discharge Plan / Estimated Discharge Date: within 24 hours  Pending urine cultures and trending electrolytes  Code Status: Level 1 - Full Code      Subjective:   Patient does not speak at baseline  Is calm and resting  Objective:     Vitals:   Temp (24hrs), Av 9 °F (36 6 °C), Min:97 8 °F (36 6 °C), Max:98 1 °F (36 7 °C)    Temp:  [97 8 °F (36 6 °C)-98 1 °F (36 7 °C)] 97 8 °F (36 6 °C)  HR:  [77-83] 83  Resp:  [16-18] 18  BP: (102-152)/(66-96) 140/80  SpO2:  [92 %-99 %] 92 %  Body mass index is 20 14 kg/m²  Input and Output Summary (last 24 hours): Intake/Output Summary (Last 24 hours) at 2020 1542  Last data filed at 2020 1300  Gross per 24 hour   Intake 40 ml   Output 1125 ml   Net -1085 ml       Physical Exam:     Physical Exam   Constitutional: She appears well-developed and well-nourished  No distress  HENT:   Head: Normocephalic and atraumatic  Cardiovascular: Normal rate, regular rhythm and normal heart sounds  Pulmonary/Chest: Effort normal and breath sounds normal  No respiratory distress  She has no wheezes  Abdominal: Soft  Bowel sounds are normal  She exhibits no distension  There is no tenderness  Musculoskeletal: She exhibits no edema, tenderness or deformity  Skin: Skin is warm and dry  She is not diaphoretic  No erythema  No pallor             Additional Data:     Labs:    Results from last 7 days   Lab Units 20  0441   WBC Thousand/uL 6 77   HEMOGLOBIN g/dL 11 8   HEMATOCRIT % 34 4*   PLATELETS Thousands/uL 126*   NEUTROS PCT % 57   LYMPHS PCT % 33   MONOS PCT % 9   EOS PCT % 1     Results from last 7 days   Lab Units 07/27/20  0441 07/26/20  1153   POTASSIUM mmol/L 4 5 4 3   CHLORIDE mmol/L 109* 104   CO2 mmol/L 35* 33*   BUN mg/dL 12 14   CREATININE mg/dL 0 92 1 19   CALCIUM mg/dL 7 9* 10 1   ALK PHOS U/L  --  76   ALT U/L  --  39   AST U/L  --  24     Results from last 7 days   Lab Units 07/26/20  1153   INR  0 97       * I Have Reviewed All Lab Data Listed Above  * Additional Pertinent Lab Tests Reviewed: All Labs Within Last 24 Hours Reviewed    Imaging:    Imaging Reports Reviewed Today Include:   Imaging Personally Reviewed by Myself Includes:      Recent Cultures (last 7 days):     Results from last 7 days   Lab Units 07/26/20  1237 07/26/20  1153 07/22/20  1856   BLOOD CULTURE  Received in Microbiology Lab  Culture in Progress  Received in Microbiology Lab  Culture in Progress    --    URINE CULTURE   --   --  >100,000 cfu/ml Enterobacter aerogenes*  >100,000 cfu/ml Enterobacter aerogenes*       Last 24 Hours Medication List:     Current Facility-Administered Medications:  acetaminophen 650 mg Oral Q6H PRN Inna Wolff MD    ARIPiprazole 30 mg Oral HS Inna Wolff MD    artificial tear  Both Eyes HS PRN Inna Wolff MD    baclofen 10 mg Oral TID Inna Wolff MD    calcium carbonate 500 mg Oral BID With Meals Inna Wolff MD    [START ON 7/28/2020] cefdinir 300 mg Oral Q12H [address] Luz Otero MD    cholecalciferol 2,000 Units Oral Daily Inna Wolff MD    citalopram 40 mg Oral QAM Inna Wolff MD    divalproex sodium 1,000 mg Oral Daily Inna Wolff MD    docusate sodium 100 mg Oral BID Inna Wolff MD    enoxaparin 30 mg Subcutaneous Daily Inna Wolff MD    famotidine 20 mg Oral Daily Inna Wolff MD    fluticasone 1 spray Nasal Daily PRN Inna Wolff MD    guaiFENesin 600 mg Oral BID PRN Inna Wolff MD    insulin lispro 1-5 Units Subcutaneous TID AC Inna Wolff MD    insulin lispro 1-5 Units Subcutaneous HS Inna Wolff MD    levothyroxine 25 mcg Oral Early Morning Kimi Haskins MD    LORazepam 0 5 mg Oral BID Luisito Ron MD    ondansetron 4 mg Intravenous Q6H PRN Kimi Haskins MD    oxybutynin 5 mg Oral TID Kimi Haskins MD    polyethylene glycol 17 g Oral QAM Kimi Haskins MD    pravastatin 20 mg Oral Daily With Jasson Cottrell MD    sodium chloride (PF) 3 mL Intravenous Q1H PRN Hannah Roblero MD    sodium chloride 75 mL/hr Intravenous Continuous Luisito Ron MD Last Rate: 75 mL/hr (07/27/20 1151)   traZODone 100 mg Oral HS Luisito Ron MD         Today, Patient Was Seen By: Luisito Ron MD    ** Please Note: This note has been constructed using a voice recognition system   **

## 2020-07-27 NOTE — UTILIZATION REVIEW
Initial Clinical Review    Admission: Date/Time/Statement: Admission Orders (From admission, onward)     Ordered        07/26/20 1339  Inpatient Admission (expected length of stay for this patient Order details is greater than two midnights)  Once                   Orders Placed This Encounter   Procedures    Inpatient Admission (expected length of stay for this patient Order details is greater than two midnights)     Standing Status:   Standing     Number of Occurrences:   1     Order Specific Question:   Admitting Physician     Answer:   Gricelda Hassan [[id number]]     Order Specific Question:   Level of Care     Answer:   Med Surg [16]     Order Specific Question:   Estimated length of stay     Answer:   More than 2 Midnights     Order Specific Question:   Certification     Answer:   I certify that inpatient services are medically necessary for this patient for a duration of greater than two midnights  See H&P and MD Progress Notes for additional information about the patient's course of treatment  ED Arrival Information     Expected Arrival Acuity Means of Arrival Escorted By Service Admission Type    - 7/26/2020 11:21 Urgent Ambulance [address] Urgent    Arrival Complaint    AMS        Chief Complaint   Patient presents with    Weakness - Generalized     being treated for a UTI since 7/23, RN reports decreased oral intake, lethargy     Assessment/Plan:   63 yo female brought to ER via EMS from [facility],  Admitted INPT status to [facility] level of care for treatment  acute cystitis  W/encephalopathy  Per farastabran,  Pt w/CP started w/"not acting right" and decreased 4 days ago  UTI's have caused similar behavior in past   UA/urine culture performed 7/22 At facility  Showed greater than 100,000 CFUs ofEnterobacter aerogenes  started on cephalexin 3 nights ago (500 mg bid)  Seemed to improve but mentation declined yesterday, increased lethargy w/barely any po intake     Lactic acid elevated  CXR nad  IVF bolus given in ER  Will initiate serial neuro cks, rocephin IV , change IVF to Isolyte;  Trend lactic acid q 2 hr until <2;  Daily CBC, procalcitonin  Bedside dysphagia assessment by Speech therapy, monitor/replete electrolytes  7/27   IVF Changed to NS  @  75 cc/hr    Cont IV Rocephin daily      ED Triage Vitals   Temperature Pulse Respirations Blood Pressure SpO2   07/26/20 1129 07/26/20 1128 07/26/20 1128 07/26/20 1128 07/26/20 1128   (!) 97 2 °F (36 2 °C) 95 20 121/73 96 %      Temp Source Heart Rate Source Patient Position - Orthostatic VS BP Location FiO2 (%)   07/26/20 1129 07/26/20 1128 07/26/20 1128 07/26/20 1128 --   Axillary Monitor Lying Right arm       Pain Score       07/26/20 1600       No Pain        Wt Readings from Last 1 Encounters:   07/27/20 43 7 kg (96 lb 5 5 oz)     Additional Vital Signs:    07/26/20 1300    94        96 %  None (Room air)     07/26/20 1230    100        95 %  None (Room air)       07/27/20 0700  97 8 °F (36 6 °C)  77  18  126/76  97  99 %         Pertinent Labs/Diagnostic Test Results:     7/26  cxr -   No acute cardiopulmonary disease  7/26  cT Head -  No acute intracranial abnormality       Results from last 7 days   Lab Units 07/26/20  1221   SARS-COV-2  Negative     Results from last 7 days   Lab Units 07/27/20  0441 07/26/20  1153   WBC Thousand/uL 6 77 9 77   HEMOGLOBIN g/dL 11 8 14 2   HEMATOCRIT % 34 4* 41 6   PLATELETS Thousands/uL 126* 156   NEUTROS ABS Thousands/µL 3 81 7 04         Results from last 7 days   Lab Units 07/27/20  0441 07/26/20  1153   SODIUM mmol/L 146* 144   POTASSIUM mmol/L 4 5 4 3   CHLORIDE mmol/L 109* 104   CO2 mmol/L 35* 33*   ANION GAP mmol/L 2* 7   BUN mg/dL 12 14   CREATININE mg/dL 0 92 1 19   EGFR ml/min/1 73sq m 69 50   CALCIUM mg/dL 7 9* 10 1     Results from last 7 days   Lab Units 07/26/20  1153   AST U/L 24   ALT U/L 39   ALK PHOS U/L 76   TOTAL PROTEIN g/dL 7 9   ALBUMIN g/dL 3 3*   TOTAL BILIRUBIN mg/dL 0 33     Results from last 7 days   Lab Units 07/27/20  1140 07/27/20  0717 07/26/20  2054 07/26/20  1609 07/26/20  1144   POC GLUCOSE mg/dl 159* 116 145* 130 135     Results from last 7 days   Lab Units 07/27/20  0441 07/26/20  1153   GLUCOSE RANDOM mg/dL 114 124     Results from last 7 days   Lab Units 07/26/20  1153   TROPONIN I ng/mL <0 02         Results from last 7 days   Lab Units 07/26/20  1153   PROTIME seconds 12 3   INR  0 97   PTT seconds 27         Results from last 7 days   Lab Units 07/26/20  2125 07/26/20  1153   PROCALCITONIN ng/ml 0 56* 1 22*     Results from last 7 days   Lab Units 07/26/20  2125 07/26/20  1702 07/26/20  1153   LACTIC ACID mmol/L 1 5 2 3* 4 1*         Results from last 7 days   Lab Units 07/26/20  1153   LIPASE u/L 164             Results from last 7 days   Lab Units 07/26/20  1302 07/22/20  1856   CLARITY UA  Clear Cloudy   COLOR UA  Yellow Yellow   SPEC GRAV UA  1 020 1 016   PH UA  8 0 6 5   GLUCOSE UA mg/dl Negative Negative   KETONES UA mg/dl Trace* 15 (1+)*   BLOOD UA  Small* Trace*   PROTEIN UA mg/dl Negative Negative   NITRITE UA  Negative Negative   BILIRUBIN UA  Negative Negative   UROBILINOGEN UA E U /dl 0 2 0 2   LEUKOCYTES UA  Large* Large*   WBC UA /hpf Innumerable* Innumerable*   RBC UA /hpf None Seen 4-10*   BACTERIA UA /hpf Occasional Innumerable*   EPITHELIAL CELLS WET PREP /hpf Occasional None Seen     Results from last 7 days   Lab Units 07/26/20  1237 07/26/20  1153 07/22/20  1856   BLOOD CULTURE  Received in Microbiology Lab  Culture in Progress  Received in Microbiology Lab  Culture in Progress    --    URINE CULTURE   --   --  >100,000 cfu/ml Enterobacter aerogenes*  >100,000 cfu/ml Enterobacter aerogenes*               ED Treatment:   Medication Administration from 07/26/2020 1121 to 07/26/2020 1457       Date/Time Order Dose Route Action     07/26/2020 1226 sodium chloride 0 9 % bolus 1,000 mL 1,000 mL Intravenous New Bag     07/26/2020 1338 sodium chloride 0 9 % bolus 1,000 mL 1,000 mL Intravenous New Bag     07/26/2020 1302 ceftriaxone (ROCEPHIN) 1 g/50 mL in dextrose IVPB 1,000 mg Intravenous New Bag        Past Medical History:   Diagnosis Date    Adjustment disorder     Altered mental status 12/11/2015    Anemia     Bipolar 1 disorder ([facility]     Cerebral palsy ([facility]     Chronic hypernatremia 2/6/2016    Closed fracture of left hip ([facility] ) 1/19/2016    Closed left hip fracture ([facility]     no surgery    Constipation     Dehydration 2/20/2016    Diabetes mellitus ([place] [address] )     Disease of thyroid gland     Diverticulosis     Fracture of multiple ribs of right side     Hip fracture ([place] [address] ) 07/26/2015    left    Hyperlipidemia     Hypernatremia 12/28/2018    Hypotension     Impulse control disorder     Incontinence     Intellectual disability due to developmental disorder, unspecified     Microalbuminuria     Osteopathia     Osteoporosis     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[facility])     Thrombocytopenia ([place] [address] ) 7/31/2015     Present on Admission:   Cerebral palsy ([place] [address] )   Hypothyroidism   Type 2 diabetes mellitus, without long-term current use of insulin ([facility])      Admitting Diagnosis: UTI (urinary tract infection) [N39 0]  Severe sepsis ([place] [address] ) [A41 9, R65 20]  AMS (altered mental status) [R41 82]  Age/Sex: 64 y o  female  Admission Orders:  Scd's;  Daily wgt;  I/o q shift;  Dysphagia assessment;  VS q 4 hr;  accucks qid w/SSI;   Up w/assist;  ROCEPHIN IV q 24 hr   Scheduled Medications:    Medications:  ARIPiprazole 30 mg Oral HS   baclofen 10 mg Oral TID   calcium carbonate 500 mg Oral BID With Meals   [START ON 7/28/2020] cefdinir 300 mg Oral Q12H [address]   cholecalciferol 2,000 Units Oral Daily   citalopram 40 mg Oral QAM   divalproex sodium 1,000 mg Oral Daily   docusate sodium 100 mg Oral BID   enoxaparin 30 mg Subcutaneous Daily   famotidine 20 mg Oral Daily   insulin lispro 1-5 Units Subcutaneous TID AC   insulin lispro 1-5 Units Subcutaneous HS levothyroxine 25 mcg Oral Early Morning   LORazepam 0 5 mg Oral BID   oxybutynin 5 mg Oral TID   polyethylene glycol 17 g Oral QAM   pravastatin 20 mg Oral Daily With Dinner   traZODone 100 mg Oral HS     Continuous IV Infusions:    sodium chloride 75 mL/hr Intravenous Continuous     PRN Meds:    acetaminophen 650 mg Oral Q6H PRN   artificial tear  Both Eyes HS PRN   fluticasone 1 spray Nasal Daily PRN   guaiFENesin 600 mg Oral BID PRN   ondansetron 4 mg Intravenous Q6H PRN   sodium chloride (PF) 3 mL Intravenous Q1H PRN       Network Utilization Review Department  [email or web address]  ATTENTION: Please call with any questions or concerns to [phone] and carefully listen to the prompts so that you are directed to the right person  All voicemails are confidential   Elvira Mcgraw all requests for admission clinical reviews, approved or denied determinations and any other requests to dedicated fax number below belonging to the campus where the patient is receiving treatment   List of dedicated fax numbers for the Facilities:  [address] DENIALS (Administrative/Medical Necessity) [phone]   [address] (Maternity/NICU/Pediatrics) [phone]   Daniele Fox [phone]   Pedro Pablo Bray [phone]   Libia Lynn [phone]   Elizabeth Sin [phone]   [facility] [address] [phone]   [facility]  [phone]   [address] [facility]  [facility] [address] [facility] [phone]

## 2020-07-28 LAB
ANION GAP SERPL CALCULATED.3IONS-SCNC: 6 MMOL/L (ref 4–13)
ATRIAL RATE: 92 BPM
BACTERIA UR CULT: ABNORMAL
BUN SERPL-MCNC: 15 MG/DL (ref 5–25)
CALCIUM SERPL-MCNC: 8.1 MG/DL (ref 8.3–10.1)
CHLORIDE SERPL-SCNC: 107 MMOL/L (ref 100–108)
CO2 SERPL-SCNC: 27 MMOL/L (ref 21–32)
CREAT SERPL-MCNC: 0.93 MG/DL (ref 0.6–1.3)
ERYTHROCYTE [DISTWIDTH] IN BLOOD BY AUTOMATED COUNT: 15.4 % (ref 11.6–15.1)
GFR SERPL CREATININE-BSD FRML MDRD: 68 ML/MIN/1.73SQ M
GLUCOSE SERPL-MCNC: 101 MG/DL (ref 65–140)
GLUCOSE SERPL-MCNC: 101 MG/DL (ref 65–140)
GLUCOSE SERPL-MCNC: 137 MG/DL (ref 65–140)
GLUCOSE SERPL-MCNC: 167 MG/DL (ref 65–140)
GLUCOSE SERPL-MCNC: 225 MG/DL (ref 65–140)
HCT VFR BLD AUTO: 37.3 % (ref 34.8–46.1)
HGB BLD-MCNC: 12.9 G/DL (ref 11.5–15.4)
MCH RBC QN AUTO: 36.1 PG (ref 26.8–34.3)
MCHC RBC AUTO-ENTMCNC: 34.6 G/DL (ref 31.4–37.4)
MCV RBC AUTO: 105 FL (ref 82–98)
P AXIS: 79 DEGREES
PLATELET # BLD AUTO: 128 THOUSANDS/UL (ref 149–390)
PMV BLD AUTO: 11.6 FL (ref 8.9–12.7)
POTASSIUM SERPL-SCNC: 3.9 MMOL/L (ref 3.5–5.3)
PR INTERVAL: 148 MS
QRS AXIS: 72 DEGREES
QRSD INTERVAL: 76 MS
QT INTERVAL: 356 MS
QTC INTERVAL: 440 MS
RBC # BLD AUTO: 3.57 MILLION/UL (ref 3.81–5.12)
SODIUM SERPL-SCNC: 140 MMOL/L (ref 136–145)
T WAVE AXIS: 96 DEGREES
VENTRICULAR RATE: 92 BPM
WBC # BLD AUTO: 6.82 THOUSAND/UL (ref 4.31–10.16)

## 2020-07-28 PROCEDURE — 82948 REAGENT STRIP/BLOOD GLUCOSE: CPT

## 2020-07-28 PROCEDURE — 93010 ELECTROCARDIOGRAM REPORT: CPT | Performed by: INTERNAL MEDICINE

## 2020-07-28 PROCEDURE — 99232 SBSQ HOSP IP/OBS MODERATE 35: CPT | Performed by: INTERNAL MEDICINE

## 2020-07-28 PROCEDURE — 85027 COMPLETE CBC AUTOMATED: CPT | Performed by: INTERNAL MEDICINE

## 2020-07-28 PROCEDURE — 80048 BASIC METABOLIC PNL TOTAL CA: CPT | Performed by: INTERNAL MEDICINE

## 2020-07-28 RX ORDER — SODIUM CHLORIDE 450 MG/100ML
100 INJECTION, SOLUTION INTRAVENOUS CONTINUOUS
Status: DISPENSED | OUTPATIENT
Start: 2020-07-28 | End: 2020-07-28

## 2020-07-28 RX ADMIN — OXYBUTYNIN 5 MG: 5 TABLET, FILM COATED, EXTENDED RELEASE ORAL at 21:30

## 2020-07-28 RX ADMIN — OXYBUTYNIN 5 MG: 5 TABLET, FILM COATED, EXTENDED RELEASE ORAL at 17:39

## 2020-07-28 RX ADMIN — ARIPIPRAZOLE 30 MG: 10 TABLET ORAL at 21:31

## 2020-07-28 RX ADMIN — INSULIN LISPRO 2 UNITS: 100 INJECTION, SOLUTION INTRAVENOUS; SUBCUTANEOUS at 21:34

## 2020-07-28 RX ADMIN — SODIUM CHLORIDE 75 ML/HR: 0.45 INJECTION, SOLUTION INTRAVENOUS at 01:09

## 2020-07-28 RX ADMIN — DOCUSATE SODIUM 100 MG: 100 CAPSULE, LIQUID FILLED ORAL at 17:40

## 2020-07-28 RX ADMIN — BACLOFEN 10 MG: 10 TABLET ORAL at 21:30

## 2020-07-28 RX ADMIN — BACLOFEN 10 MG: 10 TABLET ORAL at 17:40

## 2020-07-28 RX ADMIN — SODIUM CHLORIDE 100 ML/HR: 0.45 INJECTION, SOLUTION INTRAVENOUS at 15:32

## 2020-07-28 RX ADMIN — PRAVASTATIN SODIUM 20 MG: 20 TABLET ORAL at 17:40

## 2020-07-28 RX ADMIN — INSULIN LISPRO 1 UNITS: 100 INJECTION, SOLUTION INTRAVENOUS; SUBCUTANEOUS at 17:44

## 2020-07-28 RX ADMIN — CEFDINIR 300 MG: 300 CAPSULE ORAL at 21:30

## 2020-07-28 NOTE — PLAN OF CARE
Problem: NEUROSENSORY - ADULT  Goal: Achieves stable or improved neurological status  Description  INTERVENTIONS  - Monitor and report changes in neurological status  - Monitor vital signs such as temperature, blood pressure, glucose, and any other labs ordered   - Initiate measures to prevent increased intracranial pressure  - Monitor for seizure activity and implement precautions if appropriate      Outcome: Progressing

## 2020-07-28 NOTE — ASSESSMENT & PLAN NOTE
· Likely secondary to urinary tract infection  · No recent change in medication  · Chest x-ray-No acute cardiopulmonary disease  · CT head- No acute intracranial abnormality    · Q4 neuro checks  · Will hold Ativan and trazodone today

## 2020-07-28 NOTE — ASSESSMENT & PLAN NOTE
Lab Results   Component Value Date    HGBA1C 6 6 (H) 03/12/2020       Recent Labs     07/27/20  1616 07/27/20  2045 07/28/20  0722 07/28/20  1050   POCGLU 120 301* 101 137       Blood Sugar Average: Last 72 hrs:  (P) 728 [id number]     · On metformin at home    Recent hemoglobin A1c in March 6 6  · Hold Metformin,started Sub Q insulin  · Monitor POCT glucose  · Will monitor for Hypoglycemia

## 2020-07-28 NOTE — ASSESSMENT & PLAN NOTE
· Patient is afebrile today and does not have leukocytosis  · Lactic acid levels improved today to 1 5 and Procal is 0 56  · Urine cultures show Klebsiella   · On PO Ancef today  Will continue cycle of and have, and switch over to suppressive therapy with nitrofurantoin as patient is on in her facility    · Monitor Vital signs, CBC,

## 2020-07-28 NOTE — ASSESSMENT & PLAN NOTE
Patient has Na level of 140 today  The group facility she is from states that patient tends to become hyponatremic      -Discontinue NS and switched to 1/2NS     -Monitor Na levels

## 2020-07-28 NOTE — ASSESSMENT & PLAN NOTE
· Mentates at baseline  · Has muscular rigidity secondary to cerebral palsy  · Frequent repositioning  · Bedside dysphagia assessment, will consider speech eval

## 2020-07-28 NOTE — PROGRESS NOTES
Progress Note - Carri Meade 1960, 64 y o  female MRN: [id number]    Unit/Bed#: S -01 Encounter: [id number]    Primary Care Provider: Mary Kerr DO   Date and time admitted to hospital: 7/26/2020 11:21 AM        * Toxic metabolic encephalopathy  Assessment & Plan  · Likely secondary to urinary tract infection  · No recent change in medication  · Chest x-ray-No acute cardiopulmonary disease  · CT head- No acute intracranial abnormality  · Q4 neuro checks  · Will hold Ativan and trazodone today       Acute cystitis without hematuria  Assessment & Plan  · Patient is afebrile today and does not have leukocytosis  · Lactic acid levels improved today to 1 5 and Procal is 0 56  · Urine cultures show Klebsiella   · On PO Ancef today  Will continue cycle of and have, and switch over to suppressive therapy with nitrofurantoin as patient is on in her facility  · Monitor Vital signs, CBC,           Type 2 diabetes mellitus, without long-term current use of insulin [facility])  Assessment & Plan  Lab Results   Component Value Date    HGBA1C 6 6 (H) 03/12/2020       Recent Labs     07/27/20  1616 07/27/20  2045 07/28/20  0722 07/28/20  1050   POCGLU 120 301* 101 137       Blood Sugar Average: Last 72 hrs:  (P) 525 [id number]     · On metformin at home  Recent hemoglobin A1c in March 6 6  · Hold Metformin,started Sub Q insulin  · Monitor POCT glucose  · Will monitor for Hypoglycemia    Hypothyroidism  Assessment & Plan  · Most recent TSH in March- 2 6  · Is on Synthroid 25mcg  · Will test for TSH    Cerebral palsy (HCC)  Assessment & Plan  · Mentates at baseline  · Has muscular rigidity secondary to cerebral palsy  · Frequent repositioning  · Bedside dysphagia assessment, will consider speech eval     Hypernatremia  Assessment & Plan  Patient has Na level of 140 today  The group facility she is from states that patient tends to become hyponatremic      -Discontinue NS and switched to 1/2NS     -Monitor Na levels         VTE Pharmacologic Prophylaxis:   Pharmacologic: Enoxaparin (Lovenox)  Mechanical VTE Prophylaxis in Place: Yes    Discussions with Specialists or Other Care Team Provider:  None    Education and Discussions with Family / Patient:  Spoke at length with caretaker Gladys at [facility]  Caretaker mention the patient is on chronic suppressive therapy with nitrofurantoin 50 mg q d     Verify medication list with caretaker it is accurate in our system  Current Length of Stay: 2 day(s)    Current Patient Status: Inpatient     Discharge Plan / Estimated Discharge Date:  24-48 hours depending on patient's mental status  Code Status: Level 1 - Full Code      Subjective:   Patient is significantly more lethargic today  Wakens after calling her name or loud noises, however, immediately proceeds to go back to sleep  Patient is withdrawing from plantar reflex  Spoke with patient's caretaker, who states that this is not baseline  Objective:     Vitals:   Temp (24hrs), Av 4 °F (36 9 °C), Min:97 7 °F (36 5 °C), Max:98 9 °F (37 2 °C)    Temp:  [97 7 °F (36 5 °C)-98 9 °F (37 2 °C)] 98 9 °F (37 2 °C)  HR:  [83-94] 94  Resp:  [18] 18  BP: (123-152)/(72-98) 134/98  SpO2:  [92 %-98 %] 96 %  Body mass index is 20 04 kg/m²  Input and Output Summary (last 24 hours): Intake/Output Summary (Last 24 hours) at 2020 1254  Last data filed at 2020 0900  Gross per 24 hour   Intake 1120 ml   Output 800 ml   Net 320 ml       Physical Exam:     Physical Exam   Constitutional: She appears well-developed  HENT:   Head: Normocephalic and atraumatic  Eyes: Pupils are equal, round, and reactive to light  Conjunctivae are normal  Right eye exhibits no discharge  Left eye exhibits no discharge  Neck:   Some rigidity   Cardiovascular: Normal rate, regular rhythm, normal heart sounds and intact distal pulses     Pulmonary/Chest: Effort normal and breath sounds normal  No respiratory distress  She has no wheezes  Abdominal: Soft  Bowel sounds are normal  She exhibits no distension  There is no guarding  Musculoskeletal:   Patient has some rigidity in both upper extremities  Neurological: She exhibits abnormal muscle tone  Skin: Skin is warm and dry  No rash noted  No erythema  Additional Data:     Labs:    Results from last 7 days   Lab Units 07/28/20  0712 07/27/20  0441   WBC Thousand/uL 6 82 6 77   HEMOGLOBIN g/dL 12 9 11 8   HEMATOCRIT % 37 3 34 4*   PLATELETS Thousands/uL 128* 126*   NEUTROS PCT %  --  57   LYMPHS PCT %  --  33   MONOS PCT %  --  9   EOS PCT %  --  1     Results from last 7 days   Lab Units 07/28/20  0712  07/26/20  1153   POTASSIUM mmol/L 3 9   < > 4 3   CHLORIDE mmol/L 107   < > 104   CO2 mmol/L 27   < > 33*   BUN mg/dL 15   < > 14   CREATININE mg/dL 0 93   < > 1 19   CALCIUM mg/dL 8 1*   < > 10 1   ALK PHOS U/L  --   --  76   ALT U/L  --   --  39   AST U/L  --   --  24    < > = values in this interval not displayed  Results from last 7 days   Lab Units 07/26/20  1153   INR  0 97       * I Have Reviewed All Lab Data Listed Above  * Additional Pertinent Lab Tests Reviewed: All Labs Within Last 24 Hours Reviewed    Imaging:    Imaging Reports Reviewed Today Include:   Imaging Personally Reviewed by Myself Includes:      Recent Cultures (last 7 days):     Results from last 7 days   Lab Units 07/26/20  1302 07/26/20  1237 07/26/20  1153 07/22/20  1856   BLOOD CULTURE   --  No Growth at 24 hrs   No Growth at 24 hrs   --    URINE CULTURE  >100,000 cfu/ml Klebsiella-Enterobacter  group*  --   --  >100,000 cfu/ml Enterobacter aerogenes*  >100,000 cfu/ml Enterobacter aerogenes*       Last 24 Hours Medication List:     Current Facility-Administered Medications:  acetaminophen 650 mg Oral Q6H PRN Areli Trujillo MD   ARIPiprazole 30 mg Oral HS Areli Trujillo MD   artificial tear  Both Eyes HS PRN Areli Trujillo MD   baclofen 10 mg Oral TID Hem[place] Ashely Francis MD   calcium carbonate 500 mg Oral BID With Meals Promise Diana MD   cefdinir 300 mg Oral Q12H [address] Saeed Johnson MD   cholecalciferol 2,000 Units Oral Daily Promise Diana MD   citalopram 40 mg Oral QAM Promise Diana MD   divalproex sodium 1,000 mg Oral Daily Promise Diana MD   docusate sodium 100 mg Oral BID Promise Diana MD   enoxaparin 30 mg Subcutaneous Daily Promise Diana MD   famotidine 20 mg Oral Daily Promise Diana MD   fluticasone 1 spray Nasal Daily PRN Promise Diana MD   guaiFENesin 600 mg Oral BID PRN Promise Diana MD   insulin lispro 1-5 Units Subcutaneous TID AC Promise Diana MD   insulin lispro 1-5 Units Subcutaneous HS Promise Diana MD   levothyroxine 25 mcg Oral Early Morning Promise Diana MD   LORazepam 0 5 mg Oral BID Saeed Johnson MD   ondansetron 4 mg Intravenous Q6H PRN Promise Diana MD   oxybutynin 5 mg Oral TID Promise Diana MD   polyethylene glycol 17 g Oral QAM Promise Diana MD   pravastatin 20 mg Oral Daily With Maryanne Soto MD   sodium chloride (PF) 3 mL Intravenous Q1H PRN Mery Pena MD   traZODone 100 mg Oral HS Saeed Johnson MD        Today, Patient Was Seen By: Saeed Johnson MD    ** Please Note: This note has been constructed using a voice recognition system   **

## 2020-07-28 NOTE — PLAN OF CARE
Problem: Potential for Falls  Goal: Patient will remain free of falls  Description  INTERVENTIONS:  - Assess patient frequently for physical needs  -  Identify cognitive and physical deficits and behaviors that affect risk of falls    -  [place] fall precautions as indicated by assessment   - Educate patient/family on patient safety including physical limitations  - Instruct patient to call for assistance with activity based on assessment  - Modify environment to reduce risk of injury  - Consider OT/PT consult to assist with strengthening/mobility  Outcome: Progressing     Problem: Prexisting or High Potential for Compromised Skin Integrity  Goal: Skin integrity is maintained or improved  Description  INTERVENTIONS:  - Identify patients at risk for skin breakdown  - Assess and monitor skin integrity  - Assess and monitor nutrition and hydration status  - Monitor labs   - Assess for incontinence   - Turn and reposition patient  - Assist with mobility/ambulation  - Relieve pressure over bony prominences  - Avoid friction and shearing  - Provide appropriate hygiene as needed including keeping skin clean and dry  - Evaluate need for skin moisturizer/barrier cream  - Collaborate with interdisciplinary team   - Patient/family teaching  - Consider wound care consult   Outcome: Progressing     Problem: NEUROSENSORY - ADULT  Goal: Achieves stable or improved neurological status  Description  INTERVENTIONS  - Monitor and report changes in neurological status  - Monitor vital signs such as temperature, blood pressure, glucose, and any other labs ordered   - Initiate measures to prevent increased intracranial pressure  - Monitor for seizure activity and implement precautions if appropriate      Outcome: Progressing  Goal: Achieves maximal functionality and self care  Description  INTERVENTIONS  - Monitor swallowing and airway patency with patient fatigue and changes in neurological status  - Encourage and assist patient to increase activity and self care  - Encourage visually impaired, hearing impaired and aphasic patients to use assistive/communication devices  Outcome: Progressing     Problem: PAIN - ADULT  Goal: Verbalizes/displays adequate comfort level or baseline comfort level  Description  Interventions:  - Encourage patient to monitor pain and request assistance  - Assess pain using appropriate pain scale  - Administer analgesics based on type and severity of pain and evaluate response  - Implement non-pharmacological measures as appropriate and evaluate response  - Consider cultural and social influences on pain and pain management  - Notify physician/advanced practitioner if interventions unsuccessful or patient reports new pain  Outcome: Progressing     Problem: INFECTION - ADULT  Goal: Absence or prevention of progression during hospitalization  Description  INTERVENTIONS:  - Assess and monitor for signs and symptoms of infection  - Monitor lab/diagnostic results  - Monitor all insertion sites, i e  indwelling lines, tubes, and drains  - Monitor endotracheal if appropriate and nasal secretions for changes in amount and color  - [place] appropriate cooling/warming therapies per order  - Administer medications as ordered  - Instruct and encourage patient and family to use good hand hygiene technique  - Identify and instruct in appropriate isolation precautions for identified infection/condition  Outcome: Progressing     Problem: SAFETY ADULT  Goal: Patient will remain free of falls  Description  INTERVENTIONS:  - Assess patient frequently for physical needs  -  Identify cognitive and physical deficits and behaviors that affect risk of falls    -  [place] fall precautions as indicated by assessment   - Educate patient/family on patient safety including physical limitations  - Instruct patient to call for assistance with activity based on assessment  - Modify environment to reduce risk of injury  - Consider OT/PT consult to assist with strengthening/mobility  Outcome: Progressing  Goal: Maintain or return to baseline ADL function  Description  INTERVENTIONS:  -  Assess patient's ability to carry out ADLs; assess patient's baseline for ADL function and identify physical deficits which impact ability to perform ADLs (bathing, care of mouth/teeth, toileting, grooming, dressing, etc )  - Assess/evaluate cause of self-care deficits   - Assess range of motion  - Assess patient's mobility; develop plan if impaired  - Assess patient's need for assistive devices and provide as appropriate  - Encourage maximum independence but intervene and supervise when necessary  - Involve family in performance of ADLs  - Assess for home care needs following discharge   - Consider OT consult to assist with ADL evaluation and planning for discharge  - Provide patient education as appropriate  Outcome: Progressing  Goal: Maintain or return mobility status to optimal level  Description  INTERVENTIONS:  - Assess patient's baseline mobility status (ambulation, transfers, stairs, etc )    - Identify cognitive and physical deficits and behaviors that affect mobility  - Identify mobility aids required to assist with transfers and/or ambulation (gait belt, sit-to-stand, lift, walker, cane, etc )  - [place] fall precautions as indicated by assessment  - Record patient progress and toleration of activity level on Mobility SBAR; progress patient to next Phase/Stage  - Instruct patient to call for assistance with activity based on assessment  - Consider rehabilitation consult to assist with strengthening/weightbearing, etc   Outcome: Progressing     Problem: DISCHARGE PLANNING  Goal: Discharge to home or other facility with appropriate resources  Description  INTERVENTIONS:  - Identify barriers to discharge w/patient and caregiver  - Arrange for needed discharge resources and transportation as appropriate  - Identify discharge learning needs (meds, wound care, etc )  - Arrange for interpretive services to assist at discharge as needed  - Refer to Case Management Department for coordinating discharge planning if the patient needs post-hospital services based on physician/advanced practitioner order or complex needs related to functional status, cognitive ability, or social support system  Outcome: Progressing     Problem: Knowledge Deficit  Goal: Patient/family/caregiver demonstrates understanding of disease process, treatment plan, medications, and discharge instructions  Description  Complete learning assessment and assess knowledge base    Interventions:  - Provide teaching at level of understanding  - Provide teaching via preferred learning methods  Outcome: Progressing

## 2020-07-29 ENCOUNTER — APPOINTMENT (INPATIENT)
Dept: MRI IMAGING | Facility: HOSPITAL | Age: 60
DRG: 689 | End: 2020-07-29
Payer: COMMERCIAL

## 2020-07-29 LAB
ANION GAP SERPL CALCULATED.3IONS-SCNC: 8 MMOL/L (ref 4–13)
BASOPHILS # BLD AUTO: 0.03 THOUSANDS/ΜL (ref 0–0.1)
BASOPHILS NFR BLD AUTO: 1 % (ref 0–1)
BUN SERPL-MCNC: 15 MG/DL (ref 5–25)
CALCIUM SERPL-MCNC: 8.2 MG/DL (ref 8.3–10.1)
CHLORIDE SERPL-SCNC: 108 MMOL/L (ref 100–108)
CO2 SERPL-SCNC: 26 MMOL/L (ref 21–32)
CREAT SERPL-MCNC: 0.99 MG/DL (ref 0.6–1.3)
EOSINOPHIL # BLD AUTO: 0.01 THOUSAND/ΜL (ref 0–0.61)
EOSINOPHIL NFR BLD AUTO: 0 % (ref 0–6)
ERYTHROCYTE [DISTWIDTH] IN BLOOD BY AUTOMATED COUNT: 14.9 % (ref 11.6–15.1)
GFR SERPL CREATININE-BSD FRML MDRD: 63 ML/MIN/1.73SQ M
GLUCOSE SERPL-MCNC: 141 MG/DL (ref 65–140)
GLUCOSE SERPL-MCNC: 170 MG/DL (ref 65–140)
GLUCOSE SERPL-MCNC: 205 MG/DL (ref 65–140)
GLUCOSE SERPL-MCNC: 206 MG/DL (ref 65–140)
GLUCOSE SERPL-MCNC: 224 MG/DL (ref 65–140)
HCT VFR BLD AUTO: 40 % (ref 34.8–46.1)
HGB BLD-MCNC: 14 G/DL (ref 11.5–15.4)
IMM GRANULOCYTES # BLD AUTO: 0.01 THOUSAND/UL (ref 0–0.2)
IMM GRANULOCYTES NFR BLD AUTO: 0 % (ref 0–2)
LYMPHOCYTES # BLD AUTO: 1.81 THOUSANDS/ΜL (ref 0.6–4.47)
LYMPHOCYTES NFR BLD AUTO: 34 % (ref 14–44)
MCH RBC QN AUTO: 36 PG (ref 26.8–34.3)
MCHC RBC AUTO-ENTMCNC: 35 G/DL (ref 31.4–37.4)
MCV RBC AUTO: 103 FL (ref 82–98)
MONOCYTES # BLD AUTO: 0.64 THOUSAND/ΜL (ref 0.17–1.22)
MONOCYTES NFR BLD AUTO: 12 % (ref 4–12)
NEUTROPHILS # BLD AUTO: 2.8 THOUSANDS/ΜL (ref 1.85–7.62)
NEUTS SEG NFR BLD AUTO: 53 % (ref 43–75)
NRBC BLD AUTO-RTO: 0 /100 WBCS
PLATELET # BLD AUTO: 143 THOUSANDS/UL (ref 149–390)
PMV BLD AUTO: 11.6 FL (ref 8.9–12.7)
POTASSIUM SERPL-SCNC: 3.5 MMOL/L (ref 3.5–5.3)
RBC # BLD AUTO: 3.89 MILLION/UL (ref 3.81–5.12)
SODIUM SERPL-SCNC: 142 MMOL/L (ref 136–145)
WBC # BLD AUTO: 5.3 THOUSAND/UL (ref 4.31–10.16)

## 2020-07-29 PROCEDURE — 99222 1ST HOSP IP/OBS MODERATE 55: CPT | Performed by: PSYCHIATRY & NEUROLOGY

## 2020-07-29 PROCEDURE — 80048 BASIC METABOLIC PNL TOTAL CA: CPT | Performed by: INTERNAL MEDICINE

## 2020-07-29 PROCEDURE — 99232 SBSQ HOSP IP/OBS MODERATE 35: CPT | Performed by: INTERNAL MEDICINE

## 2020-07-29 PROCEDURE — 70551 MRI BRAIN STEM W/O DYE: CPT

## 2020-07-29 PROCEDURE — 92610 EVALUATE SWALLOWING FUNCTION: CPT

## 2020-07-29 PROCEDURE — 82948 REAGENT STRIP/BLOOD GLUCOSE: CPT

## 2020-07-29 PROCEDURE — 85025 COMPLETE CBC W/AUTO DIFF WBC: CPT | Performed by: INTERNAL MEDICINE

## 2020-07-29 RX ORDER — TRAZODONE HYDROCHLORIDE 50 MG/1
50 TABLET ORAL
Status: CANCELLED | OUTPATIENT
Start: 2020-07-29

## 2020-07-29 RX ORDER — SODIUM CHLORIDE 450 MG/100ML
50 INJECTION, SOLUTION INTRAVENOUS CONTINUOUS
Status: DISCONTINUED | OUTPATIENT
Start: 2020-07-29 | End: 2020-07-29

## 2020-07-29 RX ADMIN — ENOXAPARIN SODIUM 30 MG: 30 INJECTION SUBCUTANEOUS at 10:23

## 2020-07-29 RX ADMIN — OXYBUTYNIN 5 MG: 5 TABLET, FILM COATED, EXTENDED RELEASE ORAL at 06:22

## 2020-07-29 RX ADMIN — CEFTRIAXONE SODIUM 1000 MG: 10 INJECTION, POWDER, FOR SOLUTION INTRAVENOUS at 15:35

## 2020-07-29 RX ADMIN — LEVOTHYROXINE SODIUM 25 MCG: 25 TABLET ORAL at 06:22

## 2020-07-29 RX ADMIN — SODIUM CHLORIDE 50 ML/HR: 0.45 INJECTION, SOLUTION INTRAVENOUS at 18:04

## 2020-07-29 NOTE — ASSESSMENT & PLAN NOTE
· Patient is afebrile today and does not have leukocytosis  · Lactic acid levels improved today to 1 5 and Procal is 0 56  · Final urine culture shows Enterobacter that is resistant to amoxicillin, ampicillin and cefazolin    · Will continue patient on p o   Omnicef 300 mg   · Monitor Vital signs, CBC,

## 2020-07-29 NOTE — ASSESSMENT & PLAN NOTE
· Mentates at baseline  · Has muscular rigidity secondary to cerebral palsy  · Frequent repositioning  · Patient was then able to take some medications yesterday as she was having trouble swallowing  Plan:  · Will get speech and swallow evaluation today

## 2020-07-29 NOTE — NURSING NOTE
Pt is sleeping, took meds crushed in apple sauce for me after some persuasion  I will continue to monitor

## 2020-07-29 NOTE — PLAN OF CARE
Problem: Potential for Falls  Goal: Patient will remain free of falls  Description  INTERVENTIONS:  - Assess patient frequently for physical needs  -  Identify cognitive and physical deficits and behaviors that affect risk of falls    -  [place] fall precautions as indicated by assessment   - Educate patient/family on patient safety including physical limitations  - Instruct patient to call for assistance with activity based on assessment  - Modify environment to reduce risk of injury  - Consider OT/PT consult to assist with strengthening/mobility  Outcome: Progressing     Problem: Prexisting or High Potential for Compromised Skin Integrity  Goal: Skin integrity is maintained or improved  Description  INTERVENTIONS:  - Identify patients at risk for skin breakdown  - Assess and monitor skin integrity  - Assess and monitor nutrition and hydration status  - Monitor labs   - Assess for incontinence   - Turn and reposition patient  - Assist with mobility/ambulation  - Relieve pressure over bony prominences  - Avoid friction and shearing  - Provide appropriate hygiene as needed including keeping skin clean and dry  - Evaluate need for skin moisturizer/barrier cream  - Collaborate with interdisciplinary team   - Patient/family teaching  - Consider wound care consult   Outcome: Progressing     Problem: NEUROSENSORY - ADULT  Goal: Achieves stable or improved neurological status  Description  INTERVENTIONS  - Monitor and report changes in neurological status  - Monitor vital signs such as temperature, blood pressure, glucose, and any other labs ordered   - Initiate measures to prevent increased intracranial pressure  - Monitor for seizure activity and implement precautions if appropriate      Outcome: Progressing  Goal: Achieves maximal functionality and self care  Description  INTERVENTIONS  - Monitor swallowing and airway patency with patient fatigue and changes in neurological status  - Encourage and assist patient to increase activity and self care  - Encourage visually impaired, hearing impaired and aphasic patients to use assistive/communication devices  Outcome: Progressing     Problem: PAIN - ADULT  Goal: Verbalizes/displays adequate comfort level or baseline comfort level  Description  Interventions:  - Encourage patient to monitor pain and request assistance  - Assess pain using appropriate pain scale  - Administer analgesics based on type and severity of pain and evaluate response  - Implement non-pharmacological measures as appropriate and evaluate response  - Consider cultural and social influences on pain and pain management  - Notify physician/advanced practitioner if interventions unsuccessful or patient reports new pain  Outcome: Progressing     Problem: INFECTION - ADULT  Goal: Absence or prevention of progression during hospitalization  Description  INTERVENTIONS:  - Assess and monitor for signs and symptoms of infection  - Monitor lab/diagnostic results  - Monitor all insertion sites, i e  indwelling lines, tubes, and drains  - Monitor endotracheal if appropriate and nasal secretions for changes in amount and color  - [place] appropriate cooling/warming therapies per order  - Administer medications as ordered  - Instruct and encourage patient and family to use good hand hygiene technique  - Identify and instruct in appropriate isolation precautions for identified infection/condition  Outcome: Progressing     Problem: SAFETY ADULT  Goal: Patient will remain free of falls  Description  INTERVENTIONS:  - Assess patient frequently for physical needs  -  Identify cognitive and physical deficits and behaviors that affect risk of falls    -  [place] fall precautions as indicated by assessment   - Educate patient/family on patient safety including physical limitations  - Instruct patient to call for assistance with activity based on assessment  - Modify environment to reduce risk of injury  - Consider OT/PT consult to assist with strengthening/mobility  Outcome: Progressing  Goal: Maintain or return to baseline ADL function  Description  INTERVENTIONS:  -  Assess patient's ability to carry out ADLs; assess patient's baseline for ADL function and identify physical deficits which impact ability to perform ADLs (bathing, care of mouth/teeth, toileting, grooming, dressing, etc )  - Assess/evaluate cause of self-care deficits   - Assess range of motion  - Assess patient's mobility; develop plan if impaired  - Assess patient's need for assistive devices and provide as appropriate  - Encourage maximum independence but intervene and supervise when necessary  - Involve family in performance of ADLs  - Assess for home care needs following discharge   - Consider OT consult to assist with ADL evaluation and planning for discharge  - Provide patient education as appropriate  Outcome: Progressing  Goal: Maintain or return mobility status to optimal level  Description  INTERVENTIONS:  - Assess patient's baseline mobility status (ambulation, transfers, stairs, etc )    - Identify cognitive and physical deficits and behaviors that affect mobility  - Identify mobility aids required to assist with transfers and/or ambulation (gait belt, sit-to-stand, lift, walker, cane, etc )  - [place] fall precautions as indicated by assessment  - Record patient progress and toleration of activity level on Mobility SBAR; progress patient to next Phase/Stage  - Instruct patient to call for assistance with activity based on assessment  - Consider rehabilitation consult to assist with strengthening/weightbearing, etc   Outcome: Progressing     Problem: DISCHARGE PLANNING  Goal: Discharge to home or other facility with appropriate resources  Description  INTERVENTIONS:  - Identify barriers to discharge w/patient and caregiver  - Arrange for needed discharge resources and transportation as appropriate  - Identify discharge learning needs (meds, wound care, etc )  - Arrange for interpretive services to assist at discharge as needed  - Refer to Case Management Department for coordinating discharge planning if the patient needs post-hospital services based on physician/advanced practitioner order or complex needs related to functional status, cognitive ability, or social support system  Outcome: Progressing     Problem: Knowledge Deficit  Goal: Patient/family/caregiver demonstrates understanding of disease process, treatment plan, medications, and discharge instructions  Description  Complete learning assessment and assess knowledge base    Interventions:  - Provide teaching at level of understanding  - Provide teaching via preferred learning methods  Outcome: Progressing

## 2020-07-29 NOTE — PROGRESS NOTES
Progress Note - Aaliyah Vaughn 1960, 64 y o  female MRN: [id number]    Unit/Bed#: S -01 Encounter: [id number]    Primary Care Provider: Mine Suarez DO   Date and time admitted to hospital: 7/26/2020 11:21 AM        * Toxic metabolic encephalopathy  Assessment & Plan  · Likely secondary to urinary tract infection  · No recent change in medication  · Chest x-ray-No acute cardiopulmonary disease  · CT head- No acute intracranial abnormality  · Q4 neuro checks  · Will hold Ativan and restart trazodone today        Acute cystitis without hematuria  Assessment & Plan  · Patient is afebrile today and does not have leukocytosis  · Lactic acid levels improved today to 1 5 and Procal is 0 56  · Final urine culture shows Enterobacter that is resistant to amoxicillin, ampicillin and cefazolin    · Will continue patient on p o  Omnicef 300 mg   · Monitor Vital signs, CBC,           Cerebral palsy (HCC)  Assessment & Plan  · Mentates at baseline  · Has muscular rigidity secondary to cerebral palsy  · Frequent repositioning  · Patient was then able to take some medications yesterday as she was having trouble swallowing  Plan:  · Will get speech and swallow evaluation today  Type 2 diabetes mellitus, without long-term current use of insulin [facility])  Assessment & Plan  Lab Results   Component Value Date    HGBA1C 6 6 (H) 03/12/2020       Recent Labs     07/28/20  1050 07/28/20  1627 07/28/20 2016 07/29/20  0803   POCGLU 137 167* 225* 170*       Blood Sugar Average: Last 72 hrs:  (P) 045 [id number]     · On metformin at home  Recent hemoglobin A1c in March 6 6  · Hold Metformin,started Sub Q insulin  · Monitor POCT glucose  · Will monitor for Hypoglycemia    Hypothyroidism  Assessment & Plan  · Most recent TSH in March- 2 6  · Is on Synthroid 25mcg  · Will test for TSH    Hypernatremia  Assessment & Plan  Patient has Na level of 142 today   The group facility she is from states that patient tends to become hypernatremic      -patient received 500 cc of half normal saline yesterday  -Monitor Na levels         VTE Pharmacologic Prophylaxis:   Pharmacologic: Enoxaparin (Lovenox)  Mechanical VTE Prophylaxis in Place: Yes    Discussions with Specialists or Other Care Team Provider:     Education and Discussions with Family / Patient:  Spoke with caretaker Sree Mina and updated her on current management  Current Length of Stay: 3 day(s)    Current Patient Status: Inpatient     Discharge Plan / Estimated Discharge Date:  24 hours, pending speech swallow eval and patient's ability to take medications  Code Status: Level 1 - Full Code      Subjective:   Patient is slightly more responsive today to her name  Still seems the lethargic  Objective:     Vitals:   Temp (24hrs), Av 8 °F (37 1 °C), Min:98 5 °F (36 9 °C), Max:99 6 °F (37 6 °C)    Temp:  [98 5 °F (36 9 °C)-99 6 °F (37 6 °C)] 99 6 °F (37 6 °C)  HR:  [] 107  Resp:  [18] 18  BP: (132-155)/(84-98) 137/84  SpO2:  [93 %-99 %] 96 %  Body mass index is 19 54 kg/m²  Input and Output Summary (last 24 hours): Intake/Output Summary (Last 24 hours) at 2020 0900  Last data filed at 2020 0510  Gross per 24 hour   Intake 0 ml   Output 945 ml   Net -945 ml       Physical Exam:     Physical Exam   Constitutional: She appears well-developed  No distress  HENT:   Head: Normocephalic and atraumatic  Eyes: Conjunctivae are normal    Neck: Neck supple  Neck is not as rigid as yesterday  Cardiovascular: Regular rhythm and normal heart sounds  Exam reveals no friction rub  No murmur heard  Pulmonary/Chest: Effort normal and breath sounds normal  No respiratory distress  She has no wheezes  Abdominal: Soft  Bowel sounds are normal  She exhibits no distension  There is no tenderness  Neurological:   Patient has upper and lower extremities are not as rigid this yesterday  Skin: Skin is warm and dry  No rash noted   She is not diaphoretic  No erythema  No pallor  Additional Data:     Labs:    Results from last 7 days   Lab Units 07/29/20  0507   WBC Thousand/uL 5 30   HEMOGLOBIN g/dL 14 0   HEMATOCRIT % 40 0   PLATELETS Thousands/uL 143*   NEUTROS PCT % 53   LYMPHS PCT % 34   MONOS PCT % 12   EOS PCT % 0     Results from last 7 days   Lab Units 07/29/20  0507  07/26/20  1153   POTASSIUM mmol/L 3 5   < > 4 3   CHLORIDE mmol/L 108   < > 104   CO2 mmol/L 26   < > 33*   BUN mg/dL 15   < > 14   CREATININE mg/dL 0 99   < > 1 19   CALCIUM mg/dL 8 2*   < > 10 1   ALK PHOS U/L  --   --  76   ALT U/L  --   --  39   AST U/L  --   --  24    < > = values in this interval not displayed  Results from last 7 days   Lab Units 07/26/20  1153   INR  0 97       * I Have Reviewed All Lab Data Listed Above  * Additional Pertinent Lab Tests Reviewed: All Labs Within Last 24 Hours Reviewed    Imaging:    Imaging Reports Reviewed Today Include:   Imaging Personally Reviewed by Myself Includes:      Recent Cultures (last 7 days):     Results from last 7 days   Lab Units 07/26/20  1302 07/26/20  1237 07/26/20  1153 07/22/20  1856   BLOOD CULTURE   --  No Growth at 48 hrs  No Growth at 48 hrs    --    URINE CULTURE  >100,000 cfu/ml Enterobacter aerogenes*  --   --  >100,000 cfu/ml Enterobacter aerogenes*  >100,000 cfu/ml Enterobacter aerogenes*       Last 24 Hours Medication List:     Current Facility-Administered Medications:  acetaminophen 650 mg Oral Q6H PRN Michael Laboy MD   ARIPiprazole 30 mg Oral HS Michael Laboy MD   artificial tear  Both Eyes HS PRN Michael Laboy MD   baclofen 10 mg Oral TID Michael Laboy MD   calcium carbonate 500 mg Oral BID With Meals Michael Laboy MD   cefdinir 300 mg Oral Q12H [facility] Dae Iglesias MD   cholecalciferol 2,000 Units Oral Daily Michael Laboy MD   citalopram 40 mg Oral QAM Michael Laboy MD   divalproex sodium 1,000 mg Oral Daily Michael Laboy MD   docusate sodium 100 mg Oral BID Iqra Beverley Grijalva MD   enoxaparin 30 mg Subcutaneous Daily Perry Marshall MD   famotidine 20 mg Oral Daily Perry Marshall MD   fluticasone 1 spray Nasal Daily PRN Perry Marshall MD   guaiFENesin 600 mg Oral BID PRN Perry Marshall MD   insulin lispro 1-5 Units Subcutaneous TID AC Perry Marshall MD   insulin lispro 1-5 Units Subcutaneous HS Perry Marshall MD   levothyroxine 25 mcg Oral Early Morning Perry Marshall MD   ondansetron 4 mg Intravenous Q6H PRN Perry Marshall MD   oxybutynin 5 mg Oral TID Perry Marshall MD   polyethylene glycol 17 g Oral QAM Perry Marshall MD   pravastatin 20 mg Oral Daily With Jacky Sanchez MD   sodium chloride (PF) 3 mL Intravenous Q1H PRN Ely Foote MD        Today, Patient Was Seen By: Sonja Shaffer MD    ** Please Note: This note has been constructed using a voice recognition system   **

## 2020-07-29 NOTE — SPEECH THERAPY NOTE
Speech-Language Pathology Bedside Swallow Evaluation        Patient Name: Artur Montana    VJDED'P Date: 7/29/2020     Problem List  Patient Active Problem List   Diagnosis    Hyperlipidemia    Gastroesophageal reflux disease without esophagitis    Cerebral palsy ([address] )    Spasticity    Ambulatory dysfunction    Bipolar disorder (HCC)    Severe Intellectual disability    Type 2 diabetes mellitus, without long-term current use of insulin (HCC)    Abnormal albumin    Iron deficiency anemia    Toxic metabolic encephalopathy    Anxiety    Atopic dermatitis    Chondrodermatitis nodularis helicis of left ear    Constipation    Dry eye    Gait abnormality    Hypothyroidism    Menopause    Osteoporosis    Other chronic pain    Peripheral neuropathy    Physical deconditioning    Resting tremor    Seasonal allergies    Urinary incontinence    Osteoarthritis of both hips    Gait disturbance    Hypernatremia    Breast asymmetry    Abdominal bloating    History of fall    History of vitamin D deficiency    Acute cystitis without hematuria    Decreased appetite       Past Medical History  Past Medical History:   Diagnosis Date    Adjustment disorder     Altered mental status 12/11/2015    Anemia     Bipolar 1 disorder (HCC)     Cerebral palsy (HCC)     Chronic hypernatremia 2/6/2016    Closed fracture of left hip ([facility] ) 1/19/2016    Closed left hip fracture (HCC)     no surgery    Constipation     Dehydration 2/20/2016    Diabetes mellitus ([facility] [facility] )     Disease of thyroid gland     Diverticulosis     Fracture of multiple ribs of right side     Hip fracture ([address] ) 07/26/2015    left    Hyperlipidemia     Hypernatremia 12/28/2018    Hypotension     Impulse control disorder     Incontinence     Intellectual disability due to developmental disorder, unspecified     Microalbuminuria     Osteopathia     Osteoporosis     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HCC)     Thrombocytopenia ([address] ) 7/31/2015       Past Surgical History  Past Surgical History:   Procedure Laterality Date    ABDOMINAL SURGERY      COLECTOMY MIN      re-anastomosis 7/22/16    COLONOSCOPY N/A 7/21/2016    Procedure: COLONOSCOPY;  Surgeon: Hans Lim MD;  [facility]; Service:     COLONOSCOPY N/A 1/31/2016    Procedure: COLONOSCOPY;  Surgeon: Hans Lim MD;  [facility];  Service:     COLONOSCOPY N/A 7/25/2017    Procedure: COLONOSCOPY;  Surgeon: Hans Lim MD;  [facility]; Service: Colorectal    COLOSTOMY      EXPLORATORY LAPAROTOMY W/ BOWEL RESECTION N/A 1/31/2016    Procedure: exploratory laparotomy, left sigmoidectomy, coloproctostomy, take down splenic flexure, loop colostomy;  Surgeon: Hans Lim MD;  [facility];  Service:     MT CLOSE ENTEROSTOMY N/A 7/22/2016    Procedure: SEGMENTAL COLECTOMY WITH COLOCOLOSTOMY;  Surgeon: Hans Lim MD;  [facility];  Service: Colorectal         Current Medical Status  Pt is a 64 y o  female who presented to [address] [place] [address] with generalized weakness  Patient has intellectual disability and is minimally verbal at baseline  She was found to have a UTI and has been lethargic since admission  Per staff patient has been on a regular diet since admission but has been pocketing and either refusing solids or too lethargic to eat with minimal po intake since admission  I spoke with Ricardo Pedraza her caregiver who reports she was on a regular/soft diet ( due to lack of dentition) with thin liquids prior to admission which she tolerated well with good intake and no recent PNA or weight loss reported  She admits that the patient vomited last night when she was giving her dinner and also reports she has been refusing most of her po  She admits to hx of GERD- denies any prior issues with vomiting, gagging or retching with intake       Past medical history:   Please see H&P for details    Special Studies:  7/26 CT head: No acute intracranial abnormality    7/26 CXR: No acute cardiopulmonary disease    Social/Education/Vocational Hx:  Pt lives in group home    Swallow Information   Current Risks for Dysphagia & Aspiration: reported new dysphagia     Current Symptoms/Concerns: pocketing and vomiting with poor intake    Current Diet: regular diet and thin liquids      Baseline Diet: soft/level 3 diet, regular diet and thin liquids      Baseline Assessment   Behavior/Cognition: lethargic- requiring sternal rub to rouse for intake    Speech/Language Status: not able to to follow commands and no verbal output noted    Patient Positioning: upright in bed      Swallow Mechanism Exam   Facial: unable to assess due to positioning  Labial: unable to test 2/2 limited command following  Lingual: unable to test 2/2 limited command following  Velum: unable to visualize  Mandible: adequate ROM  Dentition: edentulous  Vocal quality:patient verbalized x1 "no"- vocal quality appeared GWFL   Volitional Cough: unable to initiate volitional cough   Resp: RA    Consistencies Assessed and Performance   Consistencies Administered: thin liquids and puree  Specific materials administered included    Oral Stage:   Patient refused most po but spoon stripping was adequate as well as straw suck when patient was encouraged and cues were provided  Bolus formation and transfer were slow and reduced with lingual thrust observed but functional with the materials administered today with no significant oral residue noted  No overt s/s reduced oral control  Pharyngeal Stage:  Swallowing initiation appeared prompt to moderately delayed with lingual pumping  Laryngeal rise was palpated and judged to be within functional limits  Multiple swallows noted throughout all presentations  Delayed coughing was observed with puree  No additional coughing, throat clearing, change in vocal quality or respiratory status noted today       Esophageal Concerns: belching and overt gagging and retching with audible backflow resuting in subsequent swallows and cough    Summary   s/s suggestive of mild-moderate oral and suspected mild pharyngeal dysphagia  Anterograde aspiration risk appears minimal however high risk for retrograde aspiration is suspected with likely esophageal vs pharyngoesophageal phase dysphagia given reported vomiting, gagging, retching, audible backflow and delayed cough with multiple swallows  She does not appear to safely tolerate any po at this time  Consider GI consult    Recommendations: NPO     Recommended Form of Meds: non-oral if possible     Aspiration precautions and compensatory swallowing strategies: oral care    Results Reviewed with: patient, RN, MD and caregiver     Dysphagia Goals: pt will participate in repeat swallow eval to determine safety of po intake and/or further instrumental testing      Plan  Will follow up after GI consult       LOURDES Vitale , [address] [place] [address] Language Pathologist   Available via [address] [place] [address] #[id number]  [place] #[id number]

## 2020-07-29 NOTE — QUICK NOTE
Caretaker Filemon Larios was at beside  Patient is not lethargic, is responsive to questions and is following some commands  Filemon Larios states that patient's UE and LE are usually very rigid at baseline  She also does not notice any changes in facial muscles from baseline  Per caretaker, patient is closest to baseline since admission now (7/29 @ 5:30 pm)  Lethargy and dysphagia are not patient's baseline

## 2020-07-29 NOTE — PROGRESS NOTES
Spoke to patient's guardian who is concerned about pt's mental status and lethargy  She stated she expressed concerns earlier but just wanted to call back again because she is worried pt is "declining even more " Guardian very concerned for update this AM  Nurse getting report aware

## 2020-07-29 NOTE — ASSESSMENT & PLAN NOTE
Lab Results   Component Value Date    HGBA1C 6 6 (H) 03/12/2020       Recent Labs     07/28/20  1050 07/28/20  1627 07/28/20 2016 07/29/20  0803   POCGLU 137 167* 225* 170*       Blood Sugar Average: Last 72 hrs:  (P) 600 [id number]     · On metformin at home    Recent hemoglobin A1c in March 6 6  · Hold Metformin,started Sub Q insulin  · Monitor POCT glucose  · Will monitor for Hypoglycemia

## 2020-07-29 NOTE — ASSESSMENT & PLAN NOTE
· Likely secondary to urinary tract infection  · No recent change in medication  · Chest x-ray-No acute cardiopulmonary disease  · CT head- No acute intracranial abnormality    · Q4 neuro checks  · Will hold Ativan and restart trazodone today

## 2020-07-29 NOTE — CONSULTS
Consultation - Neurology   Sathya Hernandez 64 y o  female MRN: [id number]  Unit/Bed#: S -01 Encounter: [id number]    Assessment/Plan   Assessment:  Sathya Hernandez is a 64 y o  female with cerebral palsy with spastic quadriparesis and intellectual disability, DM2, HLD, hypotension, hypothyroidism, prior volvulus s/p colon resection and colostomy (2016) which was reversed in 2017, and Bipolar 1 disorder who presented to [address] [place] [address] ED from her group home on 7/26/2020 for generalized weakness and encephalopathy  Found to have a UTI and was started on IV antibiotics  Neurology was consulted on 7/29/2020 due to patient continuing to be more encephalopathic and lethargic compared to baseline and had new onset dysphagia last night  Etiology for encephalopathy and lethargy likely due to TME (UTI)  Etiology for dysphagia unclear, but may be due to encephalopathy vs possible myasthenia gravis vs CVA  Plan:  1) Encephalopathy  - CT head negative for acute intracranial abnormalities  - Likely secondary to UTI (> 100,000 CFU per mL Enterobacter aerogenes on urine culture)  - Continue IV Rocephin per primary team  - Patient remains afebrile and WBC WNL  - PT/OT/ST  - Frequent neuro checks  Continue to monitor and notify neurology with any changes  - Medical management and supportive care per primary team  Correction of any metabolic or infectious disturbances    2) Dysphagia  - Began last evening  - MRI brain pending  - Labs: acetylcholine antibodies (binding, blocking, modulating) and MUSK antibody pending  - PT/OT/ST - patient is currently NPO  - GI consulted; appreciate recommendations    3) Cerebral palsy  - Nonverbal at baseline, intellectual disability, and spastic quadriparesis  - Continue baclofen 10 mg TID when able to tolerate PO meds  - Follows with Dr Napoleon Skinner with patient's caretakers over the phone    Plan of care discussed with attending neurologist   Please refer to attestation above for further recommendations  History of Present Illness     Reason for Consult / Principal Problem: TME and cerebral palsy  Hx and PE limited by: nonverbal  HPI: Lb Morris is a 64 y o  female with cerebral palsy with spastic quadriparesis and intellectual disability, DM2, HLD, hypotension, hypothyroidism, prior volvulus s/p colon resection and colostomy (2016) which was reversed in 2017, and Bipolar 1 disorder who presented to [facility] ED from her group home on 7/26/2020 for generalized weakness and encephalopathy  History obtained per chart review and caretakers over the phone due to patient being nonverbal     Per notes, at baseline, patient has intellectual disability and is minimally verbal   She was in her usual health approximately 1 5 weeks ago  Four days prior to admission, she was noted to have decreased energy and not acting like her usual self  Group home performed UA on 07/22, which revealed evidence of UTI  Patient was started on cephalexin 500 mg BID  She had slowly improved, but then worsened on the 25th  Poor energy and decreased appetite  Occasionally gagged 20 eating food, but no coughing  Patient has been afebrile  Upon arrival to the ED, vital stable  CT head revealed no acute intracranial abnormalities  Labs revealed lactic acid 4 1, UA negative nitrites, large leukocytes, urine culture > 100,000 CFU per mL Enterobacter aerogenes  Patient received 2 doses of IV ceftriaxone and 1 dose of cefdinir  Patient has remained afebrile  Lactic acid 1 5 today  Neurology was consulted on 07/29/2020 due to continued encephalopathy despite antibiotics and new onset dysphagia that began last night  Patient was seen by speech therapy today, who recommended that the patient be NPO due to mild pharyngeal dysphagia and high risk of retrograde aspiration  GI consulted  On exam today, patient is awake and lying in bed in no acute distress  Spontaneous movement in all extremities  Unable to follow any commands  Unable to obtain ROS due to being nonverbal     Patient follows with Dr Belvin Leyden in the outpatient PMR clinic  Per notes, patient has had worsening function since 2015 and requires more assistance of mobility and self-care  Spasticity in all 4 extremities is mild-to-moderate  Patient is able to ambulate with walker with assistance  Patient takes baclofen 10 mg TID for spasticity and uses bilateral AFOs carbon fiber by Ronaldo      Inpatient consult to Neurology  Consult performed by: Crystal Stephens PA-C  Consult ordered by: Stiven Fernandez MD          Review of Systems   Unable to perform ROS: Patient nonverbal       Historical Information   Past Medical History:   Diagnosis Date    Adjustment disorder     Altered mental status 12/11/2015    Anemia     [facility]     Cerebral palsy ([facility]     Chronic hypernatremia 2/6/2016    Closed fracture of left hip ([facility] ) 1/19/2016    Closed left hip fracture ([facility]     no surgery    Constipation     Dehydration 2/20/2016    Diabetes mellitus ([facility]     Disease of thyroid gland     Diverticulosis     Fracture of multiple ribs of right side     Hip fracture ([facility] ) 07/26/2015    left    Hyperlipidemia     Hypernatremia 12/28/2018    Hypotension     Impulse control disorder     Incontinence     Intellectual disability due to developmental disorder, unspecified     Microalbuminuria     Osteopathia     Osteoporosis     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[facility]     Thrombocytopenia ([facility] ) 7/31/2015     Past Surgical History:   Procedure Laterality Date    ABDOMINAL SURGERY      COLECTOMY MIN      re-anastomosis 7/22/16    COLONOSCOPY N/A 7/21/2016    Procedure: COLONOSCOPY;  Surgeon: Dexter Wright MD;  [facility];   Service:     COLONOSCOPY N/A 1/31/2016    Procedure: COLONOSCOPY;  Surgeon: Dexter Wright MD;  [facility];  Service:     COLONOSCOPY N/A 7/25/2017    Procedure: COLONOSCOPY;  Surgeon: Lisa Fry MD;  [facility]; Service: Colorectal    COLOSTOMY      EXPLORATORY LAPAROTOMY W/ BOWEL RESECTION N/A 1/31/2016    Procedure: exploratory laparotomy, left sigmoidectomy, coloproctostomy, take down splenic flexure, loop colostomy;  Surgeon: Lisa Fry MD;  [facility];  Service:     MA CLOSE ENTEROSTOMY N/A 7/22/2016    Procedure: SEGMENTAL COLECTOMY WITH COLOCOLOSTOMY;  Surgeon: Lisa Fry MD;  [facility];  Service: Colorectal     Social History   Social History     Substance and Sexual Activity   Alcohol Use No    Frequency: Never    Binge frequency: Never     Social History     Substance and Sexual Activity   Drug Use No     E-Cigarette/Vaping    E-Cigarette Use Never User      E-Cigarette/Vaping Substances    Nicotine No     THC No     CBD No     Flavoring No     Other No     Unknown No      Social History     Tobacco Use   Smoking Status Never Smoker   Smokeless Tobacco Never Used     Family History:   Family History   Problem Relation Age of Onset    Alcohol abuse Mother     Alcohol abuse Father     Diabetes Sister     No Known Problems Sister     No Known Problems Sister        Review of previous medical records was completed   Reviewed prior notes, labs, CT head    Meds/Allergies   all current active meds have been reviewed, current meds:   Current Facility-Administered Medications   Medication Dose Route Frequency    acetaminophen (TYLENOL) tablet 650 mg  650 mg Oral Q6H PRN    ARIPiprazole (ABILIFY) tablet 30 mg  30 mg Oral HS    artificial tear (LUBRIFRESH P M ) ophthalmic ointment   Both Eyes HS PRN    baclofen tablet 10 mg  10 mg Oral TID    calcium carbonate (TUMS) chewable tablet 500 mg  500 mg Oral BID With Meals    cefTRIAXone (ROCEPHIN) 1,000 mg in dextrose 5 % 50 mL IVPB  1,000 mg Intravenous Q24H    cholecalciferol (VITAMIN D3) tablet 2,000 Units  2,000 Units Oral Daily    citalopram (CeleXA) tablet 40 mg  40 mg Oral QAM    divalproex sodium (DEPAKOTE) EC tablet 1,000 mg  1,000 mg Oral Daily    docusate sodium (COLACE) capsule 100 mg  100 mg Oral BID    enoxaparin (LOVENOX) subcutaneous injection 30 mg  30 mg Subcutaneous Daily    famotidine (PEPCID) tablet 20 mg  20 mg Oral Daily    fluticasone (FLONASE) 50 mcg/act nasal spray 1 spray  1 spray Nasal Daily PRN    guaiFENesin (MUCINEX) 12 hr tablet 600 mg  600 mg Oral BID PRN    insulin lispro (HumaLOG) 100 units/mL subcutaneous injection 1-5 Units  1-5 Units Subcutaneous TID AC    insulin lispro (HumaLOG) 100 units/mL subcutaneous injection 1-5 Units  1-5 Units Subcutaneous HS    levothyroxine tablet 25 mcg  25 mcg Oral Early Morning    ondansetron (ZOFRAN) injection 4 mg  4 mg Intravenous Q6H PRN    oxybutynin (DITROPAN-XL) 24 hr tablet 5 mg  5 mg Oral TID    polyethylene glycol (GLYCOLAX) bowel prep 17 g  17 g Oral QAM    pravastatin (PRAVACHOL) tablet 20 mg  20 mg Oral Daily With Dinner    sodium chloride (PF) 0 9 % injection 3 mL  3 mL Intravenous Q1H PRN    and PTA meds:   Prior to Admission Medications   Prescriptions Last Dose Informant Patient Reported? Taking? ARIPiprazole (ABILIFY) 30 mg tablet 7/26/2020 at Unknown time Care Giver No Yes   Sig: Take 1 tablet (30 mg) by mouth daily at 8 pm   Cholecalciferol (VITAMIN D) 2000 UNITS tablet 7/26/2020 at Unknown time Care Giver Yes Yes   Sig: Take 2,000 Units by mouth daily  Citalopram Hydrobromide (CELEXA PO) 7/26/2020 at Unknown time Care Giver Yes Yes   Sig: Take 40 mg by mouth every morning     Docusate Sodium (COLACE PO) 7/26/2020 at Unknown time Care Giver Yes Yes   Sig: Take 100 mg by mouth 2 (two) times a day Hold one day for loose stools  LORazepam (ATIVAN) 0 5 mg tablet 7/26/2020 at Unknown time Care Giver Yes Yes   Sig: Take 0 5 mg by mouth 2 (two) times a day       Oxybutynin Chloride (DITROPAN XL PO) 7/26/2020 at Unknown time Care Giver Yes Yes   Sig: Take 5 mg by mouth 3 (three) times a day    Polyethylene Glycol 3350 (MIRALAX PO) 2020 at Unknown time Care Giver Yes Yes   Sig: Take 17 g by mouth every morning     Vitamins A & D (VITAMIN A & D) ointment Unknown at Unknown time Care Giver Yes No   Sig: Apply topically as needed for dry skin   acetaminophen (TYLENOL) 325 mg tablet Unknown at Unknown time Care Giver No No   Sig: Take 2 tablets (650 mg total) by mouth every 6 (six) hours as needed (pain, fever greater than 100 4)   baclofen 10 mg tablet 2020 at Unknown time  No Yes   Sig: Take 1 tablet (10 mg total) by mouth 3 (three) times a day   benzonatate (TESSALON PERLES) 100 mg capsule Unknown at Unknown time Care Giver No No   Sig: Take 1 capsule (100 mg total) by mouth every 8 (eight) hours as needed for cough   calcium carbonate (OS-MORRIS) 600 MG tablet 2020 at Unknown time  No Yes   Sig: Take 1 tablet (600 mg total) by mouth 2 (two) times a day with meals   carbamide peroxide (DEBROX) 6 5 % otic solution Unknown at Unknown time Care Giver Yes No   Sig: Administer 2 drops into both ears daily as needed 2 drops in the affected ear prn x 5 days   carboxymethylcellulose (REFRESH TEARS) 0 5 % SOLN Unknown at Unknown time Care Giver Yes No   Si drop 3 (three) times a day as needed for dry eyes   denosumab (PROLIA) 60 mg/mL   No No   Sig: Inject 1 mL (60 mg total) under the skin once for 1 dose   divalproex sodium (DEPAKOTE) 500 mg EC tablet 2020 at Unknown time Care Giver No Yes   Sig: Take 2 tablets (1,000 mg total) by mouth daily every morning for bipolar   docosanol (ABREVA) 10 % Unknown at Unknown time Care Giver No No   Sig: Apply topically 5 (five) times a day Apply and rub in 5x a day until healed as needed for lesion   famotidine (PEPCID) 20 mg tablet 2020 at Unknown time Care Giver No Yes   Sig: Take 1 tablet (20 mg total) by mouth daily   ferrous sulfate 324 (65 Fe) mg 2020 at Unknown time Care Giver Yes Yes   Sig: Take 325 mg by mouth 2 (two) times a day  fexofenadine (ALLEGRA) 180 MG tablet Unknown at Unknown time Care Giver Yes No   Sig: Take 180 mg by mouth as needed     fluticasone (FLONASE) 50 mcg/act nasal spray Unknown at Unknown time  No No   Si spray into each nostril daily as needed for rhinitis or allergies   guaiFENesin (MUCINEX) 600 mg 12 hr tablet Unknown at Unknown time  No No   Sig: Take 1 tablet (600 mg total) by mouth 2 (two) times a day as needed for cough or congestion   hydrocortisone 1 % cream Unknown at Unknown time  No No   Sig: Apply topically to affected area twice daily as needed for rash   levothyroxine (SYNTHROID) 25 mcg tablet 2020 at Unknown time Care Giver No Yes   Sig: Take 1 tablet (25 mcg) by mouth every morning   metFORMIN (GLUCOPHAGE) 1000 MG tablet 2020 at Unknown time Care Giver No Yes   Sig: Take 1 tablet (1,000 mg total) by mouth 2 (two) times a day with meals   neomycin-bacitracin-polymyxin b (NEOSPORIN) ointment Unknown at Unknown time Care Giver Yes No   Sig: Apply 1 application topically 2 (two) times a day as needed   olopatadine HCl (PATADAY) 0 2 % opth drops Unknown at Unknown time Care Giver Yes No   Sig: Administer 1 drop to both eyes as needed   simvastatin (ZOCOR) 20 mg tablet 2020 at Unknown time Care Giver No Yes   Sig: Take 1 tablet (20 mg total) by mouth daily at bedtime at 4 pm for hyperlipidemia   Patient taking differently: Take 20 mg by mouth daily with dinner at 4 pm for hyperlipidemia   traZODone (DESYREL) 100 mg tablet 2020 at Unknown time Care Giver No Yes   Sig: Take 1 tablet (100 mg total) by mouth daily at  8 pm for depression      Facility-Administered Medications: None       No Known Allergies    Objective   Vitals:Blood pressure 129/92, pulse (!) 107, temperature 98 7 °F (37 1 °C), temperature source Oral, resp  rate 18, height 4' 10" (1 473 m), weight 42 4 kg (93 lb 7 6 oz), last menstrual period 2009, SpO2 100 %, not currently breastfeeding  ,Body mass index is 19 54 kg/m²  Intake/Output Summary (Last 24 hours) at 7/29/2020 1401  Last data filed at 7/29/2020 0510  Gross per 24 hour   Intake 0 ml   Output 945 ml   Net -945 ml       Invasive Devices: Invasive Devices     Peripheral Intravenous Line            Peripheral IV 07/26/20 Right Antecubital 3 days                Physical Exam   Constitutional: No distress  Patient lying in bed in no acute distress   HENT:   Head: Normocephalic and atraumatic  Eyes: Pupils are equal, round, and reactive to light  EOM are normal    Pulmonary/Chest: Effort normal    Musculoskeletal:   Increase tone and spacticity in all extremities  Neurological: She is alert  Skin: Skin is warm and dry  She is not diaphoretic  Nursing note and vitals reviewed  Neurologic Exam     Mental Status   Patient lying in bed in no acute distress  Awake and alert  Remains nonverbal throughout the exam   Unable to follow any commands  Cranial Nerves     CN III, IV, VI   Pupils are equal, round, and reactive to light  Extraocular motions are normal    Pupils 3 mm, round, react to light bilaterally  Able to track examiner throughout the room  Social smile  Facial expressions appear symmetric  Unable to assess remainder of cranial nerves secondary to patient's mental status  Motor Exam   Muscle bulk: normal  Increased tone and spasticity in all 4 extremities  Able to maintain antigravity in bilateral upper extremities  Wiggles toes in bilateral lower extremities  Sensory Exam   Withdraws to noxious stimuli in bilateral lower extremities  Gait, Coordination, and Reflexes     Gait  Gait: (Deferred)  No resting tremor noted  No ankle clonus  Upgoing left toe  Mute right toe  Lab Results:   I have personally reviewed pertinent reports    , CBC:   Results from last 7 days   Lab Units 07/29/20  0507 07/28/20  0712 07/27/20  0441   WBC Thousand/uL 5 30 6 82 6 77   RBC Million/uL 3 89 3 57* 3 27*   HEMOGLOBIN g/dL 14 0 12 9 11 8   HEMATOCRIT % 40 0 37 3 34 4*   MCV fL 103* 105* 105*   PLATELETS Thousands/uL 143* 128* 126*   , BMP/CMP:   Results from last 7 days   Lab Units 07/29/20  0507 07/28/20  0712 07/27/20  0441 07/26/20  1153   SODIUM mmol/L 142 140 146* 144   POTASSIUM mmol/L 3 5 3 9 4 5 4 3   CHLORIDE mmol/L 108 107 109* 104   CO2 mmol/L 26 27 35* 33*   BUN mg/dL 15 15 12 14   CREATININE mg/dL 0 99 0 93 0 92 1 19   CALCIUM mg/dL 8 2* 8 1* 7 9* 10 1   AST U/L  --   --   --  24   ALT U/L  --   --   --  39   ALK PHOS U/L  --   --   --  76   EGFR ml/min/1 73sq m 63 68 69 50   , Vitamin B12:   , HgBA1C:   , TSH:   , Coagulation:   Results from last 7 days   Lab Units 07/26/20  1153   INR  0 97   , Lipid Profile:   , Ammonia:   , Urinalysis:   Results from last 7 days   Lab Units 07/26/20  1302 07/22/20  1856   COLOR UA  Yellow Yellow   CLARITY UA  Clear Cloudy   SPEC GRAV UA  1 020 1 016   PH UA  8 0 6 5   LEUKOCYTES UA  Large* Large*   NITRITE UA  Negative Negative   GLUCOSE UA mg/dl Negative Negative   KETONES UA mg/dl Trace* 15 (1+)*   BILIRUBIN UA  Negative Negative   BLOOD UA  Small* Trace*     Imaging Studies: I have personally reviewed pertinent reports  and I have personally reviewed pertinent films in PACS  EKG, Pathology, and Other Studies: I have personally reviewed pertinent reports     and I have personally reviewed pertinent films in PACS  VTE Prophylaxis: Sequential compression device (Venodyne)  and Enoxaparin (Lovenox)    Code Status: Level 1 - Full Code

## 2020-07-29 NOTE — ASSESSMENT & PLAN NOTE
Patient has Na level of 142 today  The group facility she is from states that patient tends to become hypernatremic      -patient received 500 cc of half normal saline yesterday    -Monitor Na levels

## 2020-07-30 ENCOUNTER — TELEPHONE (OUTPATIENT)
Dept: FAMILY MEDICINE CLINIC | Facility: CLINIC | Age: 60
End: 2020-07-30

## 2020-07-30 ENCOUNTER — APPOINTMENT (INPATIENT)
Dept: RADIOLOGY | Facility: HOSPITAL | Age: 60
DRG: 689 | End: 2020-07-30
Payer: COMMERCIAL

## 2020-07-30 DIAGNOSIS — Z11.59 SCREENING FOR VIRAL DISEASE: Primary | ICD-10-CM

## 2020-07-30 LAB
ANION GAP SERPL CALCULATED.3IONS-SCNC: 13 MMOL/L (ref 4–13)
BUN SERPL-MCNC: 13 MG/DL (ref 5–25)
CALCIUM SERPL-MCNC: 7.9 MG/DL (ref 8.3–10.1)
CHLORIDE SERPL-SCNC: 105 MMOL/L (ref 100–108)
CO2 SERPL-SCNC: 25 MMOL/L (ref 21–32)
CREAT SERPL-MCNC: 0.85 MG/DL (ref 0.6–1.3)
ERYTHROCYTE [DISTWIDTH] IN BLOOD BY AUTOMATED COUNT: 14.7 % (ref 11.6–15.1)
GFR SERPL CREATININE-BSD FRML MDRD: 76 ML/MIN/1.73SQ M
GLUCOSE SERPL-MCNC: 186 MG/DL (ref 65–140)
GLUCOSE SERPL-MCNC: 196 MG/DL (ref 65–140)
GLUCOSE SERPL-MCNC: 200 MG/DL (ref 65–140)
GLUCOSE SERPL-MCNC: 280 MG/DL (ref 65–140)
GLUCOSE SERPL-MCNC: 294 MG/DL (ref 65–140)
HCT VFR BLD AUTO: 39.8 % (ref 34.8–46.1)
HGB BLD-MCNC: 13.7 G/DL (ref 11.5–15.4)
MCH RBC QN AUTO: 35.2 PG (ref 26.8–34.3)
MCHC RBC AUTO-ENTMCNC: 34.4 G/DL (ref 31.4–37.4)
MCV RBC AUTO: 102 FL (ref 82–98)
PLATELET # BLD AUTO: 142 THOUSANDS/UL (ref 149–390)
PMV BLD AUTO: 11.3 FL (ref 8.9–12.7)
POTASSIUM SERPL-SCNC: 3.2 MMOL/L (ref 3.5–5.3)
RBC # BLD AUTO: 3.89 MILLION/UL (ref 3.81–5.12)
SODIUM SERPL-SCNC: 143 MMOL/L (ref 136–145)
WBC # BLD AUTO: 6.5 THOUSAND/UL (ref 4.31–10.16)

## 2020-07-30 PROCEDURE — 92526 ORAL FUNCTION THERAPY: CPT

## 2020-07-30 PROCEDURE — 85027 COMPLETE CBC AUTOMATED: CPT | Performed by: INTERNAL MEDICINE

## 2020-07-30 PROCEDURE — 99233 SBSQ HOSP IP/OBS HIGH 50: CPT | Performed by: INTERNAL MEDICINE

## 2020-07-30 PROCEDURE — 83519 RIA NONANTIBODY: CPT | Performed by: PHYSICIAN ASSISTANT

## 2020-07-30 PROCEDURE — 84238 ASSAY NONENDOCRINE RECEPTOR: CPT | Performed by: PHYSICIAN ASSISTANT

## 2020-07-30 PROCEDURE — 82948 REAGENT STRIP/BLOOD GLUCOSE: CPT

## 2020-07-30 PROCEDURE — NC001 PR NO CHARGE

## 2020-07-30 PROCEDURE — 99232 SBSQ HOSP IP/OBS MODERATE 35: CPT | Performed by: INTERNAL MEDICINE

## 2020-07-30 PROCEDURE — 80048 BASIC METABOLIC PNL TOTAL CA: CPT | Performed by: INTERNAL MEDICINE

## 2020-07-30 RX ORDER — TRAZODONE HYDROCHLORIDE 50 MG/1
50 TABLET ORAL
Status: DISCONTINUED | OUTPATIENT
Start: 2020-07-30 | End: 2020-08-05 | Stop reason: HOSPADM

## 2020-07-30 RX ADMIN — CALCIUM CARBONATE (ANTACID) CHEW TAB 500 MG 500 MG: 500 CHEW TAB at 17:19

## 2020-07-30 RX ADMIN — INSULIN LISPRO 2 UNITS: 100 INJECTION, SOLUTION INTRAVENOUS; SUBCUTANEOUS at 21:41

## 2020-07-30 RX ADMIN — INSULIN LISPRO 2 UNITS: 100 INJECTION, SOLUTION INTRAVENOUS; SUBCUTANEOUS at 16:39

## 2020-07-30 RX ADMIN — BACLOFEN 10 MG: 10 TABLET ORAL at 21:38

## 2020-07-30 RX ADMIN — CEFTRIAXONE SODIUM 1000 MG: 10 INJECTION, POWDER, FOR SOLUTION INTRAVENOUS at 13:47

## 2020-07-30 RX ADMIN — OXYBUTYNIN 5 MG: 5 TABLET, FILM COATED, EXTENDED RELEASE ORAL at 17:19

## 2020-07-30 RX ADMIN — ARIPIPRAZOLE 30 MG: 10 TABLET ORAL at 21:39

## 2020-07-30 RX ADMIN — DOCUSATE SODIUM 100 MG: 100 CAPSULE, LIQUID FILLED ORAL at 17:19

## 2020-07-30 RX ADMIN — INSULIN LISPRO 1 UNITS: 100 INJECTION, SOLUTION INTRAVENOUS; SUBCUTANEOUS at 12:12

## 2020-07-30 RX ADMIN — ENOXAPARIN SODIUM 30 MG: 30 INJECTION SUBCUTANEOUS at 10:31

## 2020-07-30 RX ADMIN — BACLOFEN 10 MG: 10 TABLET ORAL at 17:19

## 2020-07-30 RX ADMIN — OXYBUTYNIN 5 MG: 5 TABLET, FILM COATED, EXTENDED RELEASE ORAL at 21:38

## 2020-07-30 NOTE — DISCHARGE INSTR - DIET
puree diet with maximally thick liquids (add nearly 4 TBL thickener to 8ozs thin liquid)  Meds crushed  Liquids by tsp  Small, frequent meals  Feed slowly - watch for swallow  Fully upright for all po and for 30-45 minutes following eating  Recommend f/u with speech therapy in home (monitor for delayed swallow/risk, r/o s/s significant aspiration, maximize diet tolerance and f/u VBS in future prior to any diet advancement

## 2020-07-30 NOTE — PROGRESS NOTES
covid    COVID-19 Virtual Visit     Assessment/Plan:    Problem List Items Addressed This Visit     None        This virtual check-in was done via {AMB CORONAVIRUS VISIT XMQXYZ:84512}  Disposition:      {AMB COVID-19 DISPOSITION:33892}    {covid time spent:38567}    Encounter provider Paul Bell LPN    Provider located at [facility] [id number] [address] [facility]   [phone]    Recent Visits  Date Type Provider Dept   07/23/20 Telephone Lenn Post, [address] recent visits within past 7 days and meeting all other requirements     Today's Visits  Date Type Provider Dept   07/30/20 Telephone Lenn Post, [address] today's visits and meeting all other requirements     Future Appointments  No visits were found meeting these conditions  Showing future appointments within next 150 days and meeting all other requirements        Patient agrees to participate in a virtual check in via telephone or video visit instead of presenting to the office to address urgent/immediate medical needs  Patient is aware this is a billable service  After connecting through {Communication Method:98247}, the patient was identified by name and date of birth  Aaliyah Vaughn was informed that this was a telemedicine visit and that the exam was being conducted confidentially over secure lines  {Telemedicine confidentiality :37611} {Telemedicine participants:29798}  Aaliyah Vaughn acknowledged consent and understanding of privacy and security of the telemedicine visit  I informed the patient that I have reviewed her record in Epic and presented the opportunity for her to ask any questions regarding the visit today  The patient agreed to participate  Subjective  Aaliyah Vaughn is a 64 y o  female who is concerned about COVID-19  She reports {COVID-19 SYMPTOMS:88522:::0}   She has not experienced {COVID-19 SYMPTOMS:47233:::0} She {HAS/HAS NOT:20194} had contact with a person who is under investigation for or who is positive for COVID-19 within the last 14 days  She {HAS/HAS NOT:20194} been hospitalized recently for fever and/or lower respiratory symptoms  Past Medical History:   Diagnosis Date    Adjustment disorder     Altered mental status 12/11/2015    Anemia     Bipolar 1 disorder (HCC)     [facility]y ([email or web address]     Chronic hypernatremia 2/6/2016    Closed fracture of left hip ([email or web address] ) 1/19/2016    Closed left hip fracture (HCC)     no surgery    Constipation     Dehydration 2/20/2016    Diabetes mellitus ([email or web address] )     Disease of thyroid gland     Diverticulosis     Fracture of multiple ribs of right side     Hip fracture ([email or web address] ) 07/26/2015    left    Hyperlipidemia     Hypernatremia 12/28/2018    Hypotension     Impulse control disorder     Incontinence     Intellectual disability due to developmental disorder, unspecified     Microalbuminuria     Osteopathia     Osteoporosis     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[email or web address] )     Thrombocytopenia ([place] [address] ) 7/31/2015       Past Surgical History:   Procedure Laterality Date    ABDOMINAL SURGERY      COLECTOMY MIN      re-anastomosis 7/22/16    COLONOSCOPY N/A 7/21/2016    Procedure: COLONOSCOPY;  Surgeon: Afua Wyman MD;  [facility]; Service:     COLONOSCOPY N/A 1/31/2016    Procedure: COLONOSCOPY;  Surgeon: Afua Wyman MD;  [facility];  Service:     COLONOSCOPY N/A 7/25/2017    Procedure: COLONOSCOPY;  Surgeon: Afua Wyman MD;  [facility];   Service: Colorectal    COLOSTOMY      EXPLORATORY LAPAROTOMY W/ BOWEL RESECTION N/A 1/31/2016    Procedure: exploratory laparotomy, left sigmoidectomy, coloproctostomy, take down splenic flexure, loop colostomy;  Surgeon: Afua Wyman MD;  [facility];  Service:     DE CLOSE ENTEROSTOMY N/A 7/22/2016    Procedure: SEGMENTAL COLECTOMY WITH COLOCOLOSTOMY;  Surgeon: Afua Wyman MD;  [facility];  Service: Colorectal       No current facility-administered medications for this visit  No current outpatient medications on file       Facility-Administered Medications Ordered in Other Visits   Medication Dose Route Frequency Provider Last Rate Last Dose    acetaminophen (TYLENOL) tablet 650 mg  650 mg Oral Q6H PRN Constance Pozo MD        ARIPiprazole (ABILIFY) tablet 30 mg  30 mg Oral HS Constance Pozo MD   30 mg at 07/28/20 2131    artificial tear (LUBRIFRESH P M ) ophthalmic ointment   Both Eyes HS PRN Constance Pozo MD        baclofen tablet 10 mg  10 mg Oral TID Constance Pozo MD   10 mg at 07/28/20 2130    calcium carbonate (TUMS) chewable tablet 500 mg  500 mg Oral BID With Meals Constance Pozo MD   Stopped at 07/28/20 1000    cefTRIAXone (ROCEPHIN) 1,000 mg in dextrose 5 % 50 mL IVPB  1,000 mg Intravenous Q24H Yarely Lopez  mL/hr at 07/29/20 1535 1,000 mg at 07/29/20 1535    cholecalciferol (VITAMIN D3) tablet 2,000 Units  2,000 Units Oral Daily Constance Pozo MD   Stopped at 07/28/20 1016    citalopram (CeleXA) tablet 40 mg  40 mg Oral QAM Constance Pozo MD   Stopped at 07/28/20 1016    divalproex sodium (DEPAKOTE) EC tablet 1,000 mg  1,000 mg Oral Daily Constance Pozo MD   Stopped at 07/28/20 1016    docusate sodium (COLACE) capsule 100 mg  100 mg Oral BID Constance Pozo MD   100 mg at 07/28/20 1740    enoxaparin (LOVENOX) subcutaneous injection 30 mg  30 mg Subcutaneous Daily Constance Pozo MD   30 mg at 07/29/20 1023    famotidine (PEPCID) tablet 20 mg  20 mg Oral Daily Constance Pozo MD   Stopped at 07/28/20 1017    fluticasone (FLONASE) 50 mcg/act nasal spray 1 spray  1 spray Nasal Daily PRN Constance Pozo MD        guaiFENesin (MUCINEX) 12 hr tablet 600 mg  600 mg Oral BID PRN Constance Pozo MD        insulin lispro (HumaLOG) 100 units/mL subcutaneous injection 1-5 Units  1-5 Units Subcutaneous TID AC Constance Pozo, MD   1 Units at 07/28/20 1744    insulin lispro (HumaLOG) 100 units/mL subcutaneous injection 1-5 Units  1-5 Units Subcutaneous HS Alysa Ramírez MD   2 Units at 07/28/20 2134    levothyroxine tablet 25 mcg  25 mcg Oral Early Morning Alysa Ramírez MD   25 mcg at 07/29/20 0622    ondansetron (ZOFRAN) injection 4 mg  4 mg Intravenous Q6H PRN Alysa Ramírez MD        oxybutynin (DITROPAN-XL) 24 hr tablet 5 mg  5 mg Oral TID Alysa Ramírez MD   5 mg at 07/29/20 0622    polyethylene glycol (GLYCOLAX) bowel prep 17 g  17 g Oral QAM Alysa Ramírez MD   Stopped at 07/28/20 1018    pravastatin (PRAVACHOL) tablet 20 mg  20 mg Oral Daily With Vargas MD Shane   20 mg at 07/28/20 1740    sodium chloride (PF) 0 9 % injection 3 mL  3 mL Intravenous Q1H PRN Melonie Castro MD            No Known Allergies    Review of Systems    Video Exam    There were no vitals filed for this visit  Ronnell Schulte appears {GENERAL APPEARANCE:74070}  Physical Exam     VIRTUAL VISIT [facility] acknowledges that she has consented to an online visit or consultation  She understands that the online visit is based solely on information provided by her, and that, in the absence of a face-to-face physical evaluation by the physician, the diagnosis she receives is both limited and provisional in terms of accuracy and completeness  This is not intended to replace a full medical face-to-face evaluation by the physician  Nate Rodriguez understands and accepts these terms

## 2020-07-30 NOTE — ASSESSMENT & PLAN NOTE
· Patient is afebrile today and does not have leukocytosis  · Lactic acid levels improved today to 1 5 and Procal is 0 56  · Final urine culture shows Enterobacter that is resistant to amoxicillin, ampicillin and cefazolin    · Will continue patient on IV Ancef, and switched to p o   Omnicef tomorrow  · Monitor Vital signs, CBC,

## 2020-07-30 NOTE — SPEECH THERAPY NOTE
Speech Language/Pathology    Speech/Language Pathology Progress Note    Patient Name: Michelle Lott  LPLAU'O Date: 7/30/2020      Subjective:  Pt awake and alert in bed  Per MD, doing much better today  Objective:  Pt seen for diagnostic dysphagia tx to assess po readiness for possible d/c back to group home today  Current diet: NPO  Baseline diet: cut/regular with thin liquids  Pt assessed eating 4 oz pudding and 4 oz honey thick water by spoon and cup sip  Also trialed bennett cracker, nectar thick liquid, and thin liquids  Pt yelled "no" intermittently but opened mouth and retrieved all boluses presented by spoon  She was able to draw thin liquids from the straw but coughed immediately  She was unable to draw nectars and honey thick liquid from the straw, even when presented with a cut straw  Prolonged oral hold noted for all consistencies with suspected premature spill  Swallow initiation was delayed  Laryngeal rise was fair  Immediate cough noted for thin liquids and after approximately 1 oz of nectar thick liquids  Small bite of bennett cracker was provided but pt made no attempt to manipulate or transfer and it was removed from her mouth  Pt tolerated puree and honey thick liquids without overt s/s aspiration  She was noted to burp/gag x 2 toward end of presentations  Assessment:  Improved po tolerance for conservative consistencies today  Oropharyngeal deficits increase risk for aspiration with baseline diet  S/s esophageal deficits noted over time, however, pt managed with small quantities and frequent rest breaks  Plan/Recommendations:  Initiate a puree diet with honey thick liquids  Meds crushed  Liquids by tsp or small cup sips  Small, frequent meals  Fully upright for all po and for 30-45 minutes following eating  Feed slowly - watch for swallow  Recommend f/u with speech therapy for diet tolerance and advancement as appropriate

## 2020-07-30 NOTE — CONSULTS
Consultation - Mike Lazaro 64 y o  female MRN: [id number]  Unit/Bed#: S -01 Encounter: [id number]      Assessment/Plan   1  Dysphagia  Unknown etiology as of right now, Could be 2/2 cerebral palsy  MG being ruled out with labs, pending  Considering age >47, malignancy cannot be ruled out  Esophageal diverticulae in DD as well  Last Colon - 2017  Never had EGD    Based on conversation with nursing team and speech note, patient tolerated food today  Plan:  · Obtain Barium swallow, will determine if EGD needed then  · Diet as per Speech team  · Follow MG labs, pending currently    2  UTI  On Omnicef  As per primary    3  Cerebral Palsy  Neuro team on board  As per primary    History of Present Illness   Physician Requesting Consult: Paz Batista MD  Reason for Consult / Principal Problem: Dysphagia  Hx and PE limited by:   HPI: Maria Victoria Ulloa is a 64y o  year old female with PMH of cerebral palsy with spastic quadriparesis, Type 2 DM, hypothyroidism, HLD, prior volvulus s/p colon resection presented to the ED 4 days ago for altered mental status which was deemed 2/2 to UTI on initial assessment, treated with IV rocephin and currently receiving Omnicef  No fevers, chills, vomiting, abdominal pain  Patient is suffering from dysphagia for 2 days, which as per caretaker Is not baseline  Neurology team on board for cerebral palsy, noted dysphagia and deemed to be 2/2 to UTI encephalopathy on poorly reserved cerebral functioning  Ordered labs to rule out MG  Primary team has placed speech and swallow eval, barium study  Patient currently NPO  Last colonoscopy in 07/2017 after which an exploratory laparotomy was done, volvulus but unable to find exact results of this  Colonoscopy results from 2016 show colonic diverticulosis  Never had EGD done as per chart review  LFTs from admission unremarkable       HPI obtained from chart review as patient non-verbal  Attempted to call patient's caretaker Lisa at [phone] three times, but not going through  Inpatient consult to gastroenterology     Performed by  Basilio Blount MD     Authorized by Mariano Craven PA-C              Review of Systems   Unable to perform ROS: Patient unresponsive       Historical Information   Past Medical History:   Diagnosis Date    Adjustment disorder     Altered mental status 12/11/2015    Anemia     Bipolar 1 disorder ([facility] )     Cerebral palsy ([facility] )     Chronic hypernatremia 2/6/2016    Closed fracture of left hip ([facility] ) 1/19/2016    Closed left hip fracture ([facility] )     no surgery    Constipation     Dehydration 2/20/2016    Diabetes mellitus ([facility] )     Disease of thyroid gland     Diverticulosis     Fracture of multiple ribs of right side     Hip fracture ([facility] ) 07/26/2015    left    Hyperlipidemia     Hypernatremia 12/28/2018    Hypotension     Impulse control disorder     Incontinence     Intellectual disability due to developmental disorder, unspecified     Microalbuminuria     Osteopathia     Osteoporosis     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[facility] )     Thrombocytopenia ([facility] ) 7/31/2015     Past Surgical History:   Procedure Laterality Date    ABDOMINAL SURGERY      COLECTOMY MIN      re-anastomosis 7/22/16    COLONOSCOPY N/A 7/21/2016    Procedure: COLONOSCOPY;  Surgeon: Shakira Oviedo MD;  [facility]; Service:     COLONOSCOPY N/A 1/31/2016    Procedure: COLONOSCOPY;  Surgeon: Shakira Oviedo MD;  [facility];  Service:     COLONOSCOPY N/A 7/25/2017    Procedure: COLONOSCOPY;  Surgeon: Shakira Oviedo MD;  [facility];   Service: Colorectal    COLOSTOMY      EXPLORATORY LAPAROTOMY W/ BOWEL RESECTION N/A 1/31/2016    Procedure: exploratory laparotomy, left sigmoidectomy, coloproctostomy, take down splenic flexure, loop colostomy;  Surgeon: Shakira Oviedo MD;  [facility];  Service:     [place] CLOSE ENTEROSTOMY N/A 7/22/2016    Procedure: SEGMENTAL COLECTOMY WITH COLOCOLOSTOMY;  Surgeon: Velia Gonzalez MD;  [facility];  Service: Colorectal     Social History   Social History     Substance and Sexual Activity   Alcohol Use No    Frequency: Never    Binge frequency: Never     Social History     Substance and Sexual Activity   Drug Use No     E-Cigarette/Vaping    E-Cigarette Use Never User      E-Cigarette/Vaping Substances    Nicotine No     THC No     CBD No     Flavoring No     Other No     Unknown No      Social History     Tobacco Use   Smoking Status Never Smoker   Smokeless Tobacco Never Used     Family History:   Family History   Problem Relation Age of Onset    Alcohol abuse Mother     Alcohol abuse Father     Diabetes Sister     No Known Problems Sister     No Known Problems Sister        Meds/Allergies   all current active meds have been reviewed    No Known Allergies    Objective       Intake/Output Summary (Last 24 hours) at 7/30/2020 1059  Last data filed at 7/30/2020 0545  Gross per 24 hour   Intake    Output 600 ml   Net -600 ml       Invasive Devices:   Peripheral IV 07/26/20 Right Antecubital (Active)   Site Assessment Clean;Dry; Intact 7/29/2020  7:00 PM   Dressing Type Transparent 7/29/2020  7:00 PM   Line Status Flushed; Infusing 7/29/2020  7:00 PM   Dressing Status Clean;Dry; Intact 7/29/2020  7:00 PM   Dressing Change Due 07/30/20 7/29/2020  7:50 AM   Reason Not Rotated Not due 7/29/2020  7:50 AM       Physical Exam   Constitutional: She appears well-developed  No distress  lean   HENT:   Head: Normocephalic and atraumatic  Cardiovascular: Normal rate, regular rhythm and normal heart sounds  Pulmonary/Chest: Effort normal and breath sounds normal    Abdominal: Soft  Bowel sounds are normal  She exhibits no distension  There is no tenderness  Musculoskeletal: She exhibits no edema or tenderness  Neurological: She is unresponsive  Skin: She is not diaphoretic  Nursing note and vitals reviewed        Lab Results: I have personally reviewed pertinent reports  Imaging Studies: I have personally reviewed pertinent reports  EKG, Pathology, and Other Studies: I have personally reviewed pertinent reports  VTE Prophylaxis: Enoxaparin (Lovenox)    Counseling / Coordination of Care  Total floor / unit time spent today 40 minutes  Greater than 50% of total time was spent with the patient and / or family counseling and / or coordination of care

## 2020-07-30 NOTE — PLAN OF CARE
Problem: Potential for Falls  Goal: Patient will remain free of falls  Description  INTERVENTIONS:  - Assess patient frequently for physical needs  -  Identify cognitive and physical deficits and behaviors that affect risk of falls    -  [place] fall precautions as indicated by assessment   - Educate patient/family on patient safety including physical limitations  - Instruct patient to call for assistance with activity based on assessment  - Modify environment to reduce risk of injury  - Consider OT/PT consult to assist with strengthening/mobility  Outcome: Progressing     Problem: Prexisting or High Potential for Compromised Skin Integrity  Goal: Skin integrity is maintained or improved  Description  INTERVENTIONS:  - Identify patients at risk for skin breakdown  - Assess and monitor skin integrity  - Assess and monitor nutrition and hydration status  - Monitor labs   - Assess for incontinence   - Turn and reposition patient  - Assist with mobility/ambulation  - Relieve pressure over bony prominences  - Avoid friction and shearing  - Provide appropriate hygiene as needed including keeping skin clean and dry  - Evaluate need for skin moisturizer/barrier cream  - Collaborate with interdisciplinary team   - Patient/family teaching  - Consider wound care consult   Outcome: Progressing     Problem: NEUROSENSORY - ADULT  Goal: Achieves stable or improved neurological status  Description  INTERVENTIONS  - Monitor and report changes in neurological status  - Monitor vital signs such as temperature, blood pressure, glucose, and any other labs ordered   - Initiate measures to prevent increased intracranial pressure  - Monitor for seizure activity and implement precautions if appropriate      Outcome: Progressing  Goal: Achieves maximal functionality and self care  Description  INTERVENTIONS  - Monitor swallowing and airway patency with patient fatigue and changes in neurological status  - Encourage and assist patient to increase activity and self care  - Encourage visually impaired, hearing impaired and aphasic patients to use assistive/communication devices  Outcome: Progressing     Problem: PAIN - ADULT  Goal: Verbalizes/displays adequate comfort level or baseline comfort level  Description  Interventions:  - Encourage patient to monitor pain and request assistance  - Assess pain using appropriate pain scale  - Administer analgesics based on type and severity of pain and evaluate response  - Implement non-pharmacological measures as appropriate and evaluate response  - Consider cultural and social influences on pain and pain management  - Notify physician/advanced practitioner if interventions unsuccessful or patient reports new pain  Outcome: Progressing     Problem: INFECTION - ADULT  Goal: Absence or prevention of progression during hospitalization  Description  INTERVENTIONS:  - Assess and monitor for signs and symptoms of infection  - Monitor lab/diagnostic results  - Monitor all insertion sites, i e  indwelling lines, tubes, and drains  - Monitor endotracheal if appropriate and nasal secretions for changes in amount and color  - [place] appropriate cooling/warming therapies per order  - Administer medications as ordered  - Instruct and encourage patient and family to use good hand hygiene technique  - Identify and instruct in appropriate isolation precautions for identified infection/condition  Outcome: Progressing     Problem: SAFETY ADULT  Goal: Patient will remain free of falls  Description  INTERVENTIONS:  - Assess patient frequently for physical needs  -  Identify cognitive and physical deficits and behaviors that affect risk of falls    -  [place] fall precautions as indicated by assessment   - Educate patient/family on patient safety including physical limitations  - Instruct patient to call for assistance with activity based on assessment  - Modify environment to reduce risk of injury  - Consider OT/PT consult to assist with strengthening/mobility  Outcome: Progressing  Goal: Maintain or return to baseline ADL function  Description  INTERVENTIONS:  -  Assess patient's ability to carry out ADLs; assess patient's baseline for ADL function and identify physical deficits which impact ability to perform ADLs (bathing, care of mouth/teeth, toileting, grooming, dressing, etc )  - Assess/evaluate cause of self-care deficits   - Assess range of motion  - Assess patient's mobility; develop plan if impaired  - Assess patient's need for assistive devices and provide as appropriate  - Encourage maximum independence but intervene and supervise when necessary  - Involve family in performance of ADLs  - Assess for home care needs following discharge   - Consider OT consult to assist with ADL evaluation and planning for discharge  - Provide patient education as appropriate  Outcome: Progressing  Goal: Maintain or return mobility status to optimal level  Description  INTERVENTIONS:  - Assess patient's baseline mobility status (ambulation, transfers, stairs, etc )    - Identify cognitive and physical deficits and behaviors that affect mobility  - Identify mobility aids required to assist with transfers and/or ambulation (gait belt, sit-to-stand, lift, walker, cane, etc )  - [place] fall precautions as indicated by assessment  - Record patient progress and toleration of activity level on Mobility SBAR; progress patient to next Phase/Stage  - Instruct patient to call for assistance with activity based on assessment  - Consider rehabilitation consult to assist with strengthening/weightbearing, etc   Outcome: Progressing     Problem: DISCHARGE PLANNING  Goal: Discharge to home or other facility with appropriate resources  Description  INTERVENTIONS:  - Identify barriers to discharge w/patient and caregiver  - Arrange for needed discharge resources and transportation as appropriate  - Identify discharge learning needs (meds, wound care, etc )  - Arrange for interpretive services to assist at discharge as needed  - Refer to Case Management Department for coordinating discharge planning if the patient needs post-hospital services based on physician/advanced practitioner order or complex needs related to functional status, cognitive ability, or social support system  Outcome: Progressing     Problem: Knowledge Deficit  Goal: Patient/family/caregiver demonstrates understanding of disease process, treatment plan, medications, and discharge instructions  Description  Complete learning assessment and assess knowledge base    Interventions:  - Provide teaching at level of understanding  - Provide teaching via preferred learning methods  Outcome: Progressing

## 2020-07-30 NOTE — PLAN OF CARE
Problem: Potential for Falls  Goal: Patient will remain free of falls  Description  INTERVENTIONS:  - Assess patient frequently for physical needs  -  Identify cognitive and physical deficits and behaviors that affect risk of falls    -  [place] fall precautions as indicated by assessment   - Educate patient/family on patient safety including physical limitations  - Instruct patient to call for assistance with activity based on assessment  - Modify environment to reduce risk of injury  - Consider OT/PT consult to assist with strengthening/mobility  Outcome: Progressing     Problem: Prexisting or High Potential for Compromised Skin Integrity  Goal: Skin integrity is maintained or improved  Description  INTERVENTIONS:  - Identify patients at risk for skin breakdown  - Assess and monitor skin integrity  - Assess and monitor nutrition and hydration status  - Monitor labs   - Assess for incontinence   - Turn and reposition patient  - Assist with mobility/ambulation  - Relieve pressure over bony prominences  - Avoid friction and shearing  - Provide appropriate hygiene as needed including keeping skin clean and dry  - Evaluate need for skin moisturizer/barrier cream  - Collaborate with interdisciplinary team   - Patient/family teaching  - Consider wound care consult   Outcome: Progressing     Problem: NEUROSENSORY - ADULT  Goal: Achieves stable or improved neurological status  Description  INTERVENTIONS  - Monitor and report changes in neurological status  - Monitor vital signs such as temperature, blood pressure, glucose, and any other labs ordered   - Initiate measures to prevent increased intracranial pressure  - Monitor for seizure activity and implement precautions if appropriate      Outcome: Progressing  Goal: Achieves maximal functionality and self care  Description  INTERVENTIONS  - Monitor swallowing and airway patency with patient fatigue and changes in neurological status  - Encourage and assist patient to increase activity and self care  - Encourage visually impaired, hearing impaired and aphasic patients to use assistive/communication devices  Outcome: Progressing     Problem: PAIN - ADULT  Goal: Verbalizes/displays adequate comfort level or baseline comfort level  Description  Interventions:  - Encourage patient to monitor pain and request assistance  - Assess pain using appropriate pain scale  - Administer analgesics based on type and severity of pain and evaluate response  - Implement non-pharmacological measures as appropriate and evaluate response  - Consider cultural and social influences on pain and pain management  - Notify physician/advanced practitioner if interventions unsuccessful or patient reports new pain  Outcome: Progressing     Problem: INFECTION - ADULT  Goal: Absence or prevention of progression during hospitalization  Description  INTERVENTIONS:  - Assess and monitor for signs and symptoms of infection  - Monitor lab/diagnostic results  - Monitor all insertion sites, i e  indwelling lines, tubes, and drains  - Monitor endotracheal if appropriate and nasal secretions for changes in amount and color  - [place] appropriate cooling/warming therapies per order  - Administer medications as ordered  - Instruct and encourage patient and family to use good hand hygiene technique  - Identify and instruct in appropriate isolation precautions for identified infection/condition  Outcome: Progressing     Problem: SAFETY ADULT  Goal: Patient will remain free of falls  Description  INTERVENTIONS:  - Assess patient frequently for physical needs  -  Identify cognitive and physical deficits and behaviors that affect risk of falls    -  [place] fall precautions as indicated by assessment   - Educate patient/family on patient safety including physical limitations  - Instruct patient to call for assistance with activity based on assessment  - Modify environment to reduce risk of injury  - Consider OT/PT consult to assist with strengthening/mobility  Outcome: Progressing  Goal: Maintain or return to baseline ADL function  Description  INTERVENTIONS:  -  Assess patient's ability to carry out ADLs; assess patient's baseline for ADL function and identify physical deficits which impact ability to perform ADLs (bathing, care of mouth/teeth, toileting, grooming, dressing, etc )  - Assess/evaluate cause of self-care deficits   - Assess range of motion  - Assess patient's mobility; develop plan if impaired  - Assess patient's need for assistive devices and provide as appropriate  - Encourage maximum independence but intervene and supervise when necessary  - Involve family in performance of ADLs  - Assess for home care needs following discharge   - Consider OT consult to assist with ADL evaluation and planning for discharge  - Provide patient education as appropriate  Outcome: Progressing  Goal: Maintain or return mobility status to optimal level  Description  INTERVENTIONS:  - Assess patient's baseline mobility status (ambulation, transfers, stairs, etc )    - Identify cognitive and physical deficits and behaviors that affect mobility  - Identify mobility aids required to assist with transfers and/or ambulation (gait belt, sit-to-stand, lift, walker, cane, etc )  - [place] fall precautions as indicated by assessment  - Record patient progress and toleration of activity level on Mobility SBAR; progress patient to next Phase/Stage  - Instruct patient to call for assistance with activity based on assessment  - Consider rehabilitation consult to assist with strengthening/weightbearing, etc   Outcome: Progressing     Problem: DISCHARGE PLANNING  Goal: Discharge to home or other facility with appropriate resources  Description  INTERVENTIONS:  - Identify barriers to discharge w/patient and caregiver  - Arrange for needed discharge resources and transportation as appropriate  - Identify discharge learning needs (meds, wound care, etc )  - Arrange for interpretive services to assist at discharge as needed  - Refer to Case Management Department for coordinating discharge planning if the patient needs post-hospital services based on physician/advanced practitioner order or complex needs related to functional status, cognitive ability, or social support system  Outcome: Progressing     Problem: Knowledge Deficit  Goal: Patient/family/caregiver demonstrates understanding of disease process, treatment plan, medications, and discharge instructions  Description  Complete learning assessment and assess knowledge base    Interventions:  - Provide teaching at level of understanding  - Provide teaching via preferred learning methods  Outcome: Progressing

## 2020-07-30 NOTE — ASSESSMENT & PLAN NOTE
· Patient's mental status declined yesterday  Patient was more lethargic, was unable to respond to any commands  Patient was not able to take medications  Had to be switched to IV antibiotics  · Neurology was consulted for acute changes in mental status, and dysphagia  · Q4 neuro checks  · When patient was re-evaluated yesterday evening around 5:00 p m  With caretaker present at bedside; patient's mental status was improved significant, patient was able to follow commands  · Neurology consult was appreciated    · Will hold Ativan and restart trazodone today

## 2020-07-30 NOTE — ASSESSMENT & PLAN NOTE
· [place] at baseline  · Has muscular rigidity secondary to cerebral palsy  · Frequent repositioning  · Patient was then able to take some medications yesterday as she was having trouble swallowing  · Patient had difficulty swallowing yesterday, was made NPO after speech and swallow eval off, and GI was consulted  · Patient's mental status and suspicious improved significantly since yesterday today  Patient was re-evaluated by speech and swallow, and diet was changed to dysphagia level 1 with puree diet and honey thick liquids  Plan:  · Diet switched to dysphagia level 1    · GI consult appreciated

## 2020-07-30 NOTE — ASSESSMENT & PLAN NOTE
Lab Results   Component Value Date    HGBA1C 6 6 (H) 03/12/2020       Recent Labs     07/29/20  1511 07/29/20  2149 07/30/20  0759 07/30/20  1118   POCGLU 206* 224* 186* 200*       Blood Sugar Average: Last 72 hrs:  (P) 179 [id number]     · On metformin at home    Recent hemoglobin A1c in March 6 6  · Hold Metformin,started Sub Q insulin  · Monitor POCT glucose  · Will monitor for Hypoglycemia

## 2020-07-30 NOTE — COVID-19 HEALTH CARE FACILITY TRANSFER FORM
[facility] to [facility] Transfer - COVID-19 Assessment             Name of Patient: Nate Rodriguez                : 1960          Transport Date: 20       Has the patient been laboratory tested for COVID-19? []  NO  If No,Test was not indicated per  CDC Testing Criteria   May Transfer Patient   [x] YES  If Tested Results below     COVID-19 References              SARS-CoV-2   Date/Time Value Ref Range Status   2020 09:23 AM Negative Negative Final            Question is to be completed for any patient who tests positive for COVID-19        1  [] Yes [May Transfer] [] No [May Not Transfer]          Question is to be completed for any patient who is tested for COVID-19            2    [] Yes [May Not Transfer] [] No [May Transfer]          Signature of Physician or Health Care Professional: Dana Gregory MD 20          Form updated as of 3/24/2020

## 2020-07-30 NOTE — PLAN OF CARE
Plan/Recommendations:  Initiate a puree diet with honey thick liquids  Meds crushed  Liquids by tsp or small cup sips  Small, frequent meals  Fully upright for all po and for 30-45 minutes following eating  Feed slowly - watch for swallow  Recommend f/u with speech therapy for diet tolerance and advancement as appropriate

## 2020-07-30 NOTE — PROGRESS NOTES
Progress Note - Baylee Sanders 1960, 64 y o  female MRN: [id number]    Unit/Bed#: S -01 Encounter: [id number]    Primary Care Provider: Taniya Santiago DO   Date and time admitted to hospital: 7/26/2020 11:21 AM        * Toxic metabolic encephalopathy  Assessment & Plan  · Patient's mental status declined yesterday  Patient was more lethargic, was unable to respond to any commands  Patient was not able to take medications  Had to be switched to IV antibiotics  · Neurology was consulted for acute changes in mental status, and dysphagia  · Q4 neuro checks  · When patient was re-evaluated yesterday evening around 5:00 p m  With caretaker present at bedside; patient's mental status was improved significant, patient was able to follow commands  · Neurology consult was appreciated  · Will hold Ativan and restart trazodone today        Acute cystitis without hematuria  Assessment & Plan  · Patient is afebrile today and does not have leukocytosis  · Lactic acid levels improved today to 1 5 and Procal is 0 56  · Final urine culture shows Enterobacter that is resistant to amoxicillin, ampicillin and cefazolin    · Will continue patient on IV Ancef, and switched to p o  Omnicef tomorrow  · Monitor Vital signs, CBC,           Cerebral palsy (HCC)  Assessment & Plan  · [place] at baseline  · Has muscular rigidity secondary to cerebral palsy  · Frequent repositioning  · Patient was then able to take some medications yesterday as she was having trouble swallowing  · Patient had difficulty swallowing yesterday, was made NPO after speech and swallow eval off, and GI was consulted  · Patient's mental status and suspicious improved significantly since yesterday today  Patient was re-evaluated by speech and swallow, and diet was changed to dysphagia level 1 with puree diet and honey thick liquids  Plan:  · Diet switched to dysphagia level 1    · GI consult appreciated    Type 2 diabetes mellitus, without long-term current use of insulin [facility])  Assessment & Plan  Lab Results   Component Value Date    HGBA1C 6 6 (H) 2020       Recent Labs     20  1511 20  2149 20  0759 20  1118   POCGLU 206* 224* 186* 200*       Blood Sugar Average: Last 72 hrs:  (P) 179 [id number]     · On metformin at home  Recent hemoglobin A1c in  6  · Hold Metformin,started Sub Q insulin  · Monitor POCT glucose  · Will monitor for Hypoglycemia    Hypothyroidism  Assessment & Plan  · Most recent TSH in  6  · Is on Synthroid 25mcg  · Will test for TSH    Hypernatremia  Assessment & Plan  Patient has Na level of 142 today  The group facility she is from states that patient tends to become hypernatremic      -patient received 500 cc of half normal saline yesterday  -Monitor Na levels         VTE Pharmacologic Prophylaxis:   Pharmacologic: Enoxaparin (Lovenox)  Mechanical VTE Prophylaxis in Place: Yes    Discussions with Specialists or Other Care Team Provider:  Neurology and GI  Education and Discussions with Family / Patient:  Nicolás Escalante extensively to patient's caretaker, Swati Plascencia, who stated that she would prefer if patient could be discharged tomorrow rather than today as patient is from group home, and will require some time before patient can be brought back to the group home  Current Length of Stay: 4 day(s)    Current Patient Status: Inpatient     Discharge Plan / Estimated Discharge Date:  24 hours    Code Status: Level 1 - Full Code      Subjective:   Patient is doing a lot better today, his able to remain awake and follow basic commands  Is resting comfortably  Objective:     Vitals:   Temp (24hrs), Av °F (36 7 °C), Min:97 6 °F (36 4 °C), Max:98 1 °F (36 7 °C)    Temp:  [97 6 °F (36 4 °C)-98 1 °F (36 7 °C)] 97 6 °F (36 4 °C)  HR:  [] 91  Resp:  [14-18] 14  BP: (130-154)/(78-87) 154/80  SpO2:  [97 %-98 %] 97 %  Body mass index is 18 8 kg/m²       Input and Output Summary (last 24 hours): Intake/Output Summary (Last 24 hours) at 7/30/2020 1306  Last data filed at 7/30/2020 0545  Gross per 24 hour   Intake    Output 600 ml   Net -600 ml       Physical Exam:     Physical Exam   Constitutional: She appears well-developed and well-nourished  No distress  HENT:   Head: Normocephalic and atraumatic  Eyes: Conjunctivae are normal  Right eye exhibits no discharge  Left eye exhibits no discharge  No scleral icterus  Neck: Neck supple  Cardiovascular: Regular rhythm, normal heart sounds and intact distal pulses  No murmur heard  Pulmonary/Chest: Effort normal and breath sounds normal  No respiratory distress  She has no wheezes  Abdominal: Soft  Bowel sounds are normal  She exhibits no distension  There is no tenderness  Musculoskeletal: She exhibits no edema, tenderness or deformity  Skin: Skin is warm and dry  She is not diaphoretic  No erythema  No pallor  Additional Data:     Labs:    Results from last 7 days   Lab Units 07/30/20  1042 07/29/20  0507   WBC Thousand/uL 6 50 5 30   HEMOGLOBIN g/dL 13 7 14 0   HEMATOCRIT % 39 8 40 0   PLATELETS Thousands/uL 142* 143*   NEUTROS PCT %  --  53   LYMPHS PCT %  --  34   MONOS PCT %  --  12   EOS PCT %  --  0     Results from last 7 days   Lab Units 07/30/20  1042  07/26/20  1153   POTASSIUM mmol/L 3 2*   < > 4 3   CHLORIDE mmol/L 105   < > 104   CO2 mmol/L 25   < > 33*   BUN mg/dL 13   < > 14   CREATININE mg/dL 0 85   < > 1 19   CALCIUM mg/dL 7 9*   < > 10 1   ALK PHOS U/L  --   --  76   ALT U/L  --   --  39   AST U/L  --   --  24    < > = values in this interval not displayed  Results from last 7 days   Lab Units 07/26/20  1153   INR  0 97       * I Have Reviewed All Lab Data Listed Above  * Additional Pertinent Lab Tests Reviewed:  All Labs Within Last 24 Hours Reviewed    Imaging:    Imaging Reports Reviewed Today Include:   Imaging Personally Reviewed by Myself Includes:      Recent Cultures (last 7 days): Results from last 7 days   Lab Units 07/26/20  1302 07/26/20  1237 07/26/20  1153   BLOOD CULTURE   --  No Growth at 72 hrs  No Growth at 72 hrs  URINE CULTURE  >100,000 cfu/ml Enterobacter aerogenes*  --   --        Last 24 Hours Medication List:     Current Facility-Administered Medications:  acetaminophen 650 mg Oral Q6H PRN Nannette Dave MD    ARIPiprazole 30 mg Oral HS Nannette Dave MD    artificial tear  Both Eyes HS PRN Nannette Dave MD    baclofen 10 mg Oral TID Nannette aDve MD    calcium carbonate 500 mg Oral BID With Meals Nannette Dave MD    cefTRIAXone 1,000 mg Intravenous Q24H Robert Spann MD Last Rate: 1,000 mg (07/29/20 1535)   cholecalciferol 2,000 Units Oral Daily Nannette Dave MD    citalopram 40 mg Oral QAM Nannette Dave MD    divalproex sodium 1,000 mg Oral Daily Nannette Dave MD    docusate sodium 100 mg Oral BID Nannette Dave MD    enoxaparin 30 mg Subcutaneous Daily Nannette Dave MD    famotidine 20 mg Oral Daily Nannette Dave MD    fluticasone 1 spray Nasal Daily PRN Nannette Dave MD    guaiFENesin 600 mg Oral BID PRN Nannette Dave MD    insulin lispro 1-5 Units Subcutaneous TID AC Nannette Dave MD    insulin lispro 1-5 Units Subcutaneous HS Nannette Dave MD    levothyroxine 25 mcg Oral Early Morning Nannette Dave MD    ondansetron 4 mg Intravenous Q6H PRN Nannette Dave MD    oxybutynin 5 mg Oral TID Nannette Dave MD    polyethylene glycol 17 g Oral QALOURDES Dave MD    pravastatin 20 mg Oral Daily With Lavern Mendes MD    sodium chloride (PF) 3 mL Intravenous Q1H PRN Dexter Fam MD    traZODone 50 mg Oral HS Robert Spann MD         Today, Patient Was Seen By: Robert Spann MD    ** Please Note: This note has been constructed using a voice recognition system   **

## 2020-07-31 ENCOUNTER — APPOINTMENT (INPATIENT)
Dept: NEUROLOGY | Facility: HOSPITAL | Age: 60
DRG: 689 | End: 2020-07-31
Payer: COMMERCIAL

## 2020-07-31 PROBLEM — E87.6 HYPOKALEMIA: Status: ACTIVE | Noted: 2020-07-31

## 2020-07-31 LAB
ANION GAP SERPL CALCULATED.3IONS-SCNC: 5 MMOL/L (ref 4–13)
BACTERIA BLD CULT: NORMAL
BACTERIA BLD CULT: NORMAL
BUN SERPL-MCNC: 10 MG/DL (ref 5–25)
CALCIUM SERPL-MCNC: 8 MG/DL (ref 8.3–10.1)
CHLORIDE SERPL-SCNC: 107 MMOL/L (ref 100–108)
CO2 SERPL-SCNC: 31 MMOL/L (ref 21–32)
CREAT SERPL-MCNC: 1.02 MG/DL (ref 0.6–1.3)
GFR SERPL CREATININE-BSD FRML MDRD: 61 ML/MIN/1.73SQ M
GLUCOSE SERPL-MCNC: 187 MG/DL (ref 65–140)
GLUCOSE SERPL-MCNC: 299 MG/DL (ref 65–140)
GLUCOSE SERPL-MCNC: 323 MG/DL (ref 65–140)
GLUCOSE SERPL-MCNC: 334 MG/DL (ref 65–140)
GLUCOSE SERPL-MCNC: 350 MG/DL (ref 65–140)
GLUCOSE SERPL-MCNC: 359 MG/DL (ref 65–140)
POTASSIUM SERPL-SCNC: 3.3 MMOL/L (ref 3.5–5.3)
SARS-COV-2 RNA RESP QL NAA+PROBE: NEGATIVE
SODIUM SERPL-SCNC: 143 MMOL/L (ref 136–145)

## 2020-07-31 PROCEDURE — 95816 EEG AWAKE AND DROWSY: CPT

## 2020-07-31 PROCEDURE — 95816 EEG AWAKE AND DROWSY: CPT | Performed by: PSYCHIATRY & NEUROLOGY

## 2020-07-31 PROCEDURE — 87635 SARS-COV-2 COVID-19 AMP PRB: CPT | Performed by: INTERNAL MEDICINE

## 2020-07-31 PROCEDURE — 82948 REAGENT STRIP/BLOOD GLUCOSE: CPT

## 2020-07-31 PROCEDURE — 80048 BASIC METABOLIC PNL TOTAL CA: CPT | Performed by: INTERNAL MEDICINE

## 2020-07-31 PROCEDURE — 92526 ORAL FUNCTION THERAPY: CPT

## 2020-07-31 PROCEDURE — 99232 SBSQ HOSP IP/OBS MODERATE 35: CPT | Performed by: PSYCHIATRY & NEUROLOGY

## 2020-07-31 PROCEDURE — 99232 SBSQ HOSP IP/OBS MODERATE 35: CPT | Performed by: INTERNAL MEDICINE

## 2020-07-31 RX ORDER — INSULIN GLARGINE 100 [IU]/ML
5 INJECTION, SOLUTION SUBCUTANEOUS
Status: DISCONTINUED | OUTPATIENT
Start: 2020-07-31 | End: 2020-08-01

## 2020-07-31 RX ORDER — DIVALPROEX SODIUM 125 MG/1
500 CAPSULE, COATED PELLETS ORAL EVERY 12 HOURS SCHEDULED
Status: DISCONTINUED | OUTPATIENT
Start: 2020-07-31 | End: 2020-07-31

## 2020-07-31 RX ORDER — POTASSIUM CHLORIDE 20 MEQ/1
40 TABLET, EXTENDED RELEASE ORAL ONCE
Status: COMPLETED | OUTPATIENT
Start: 2020-07-31 | End: 2020-07-31

## 2020-07-31 RX ORDER — LORAZEPAM 2 MG/ML
0.5 INJECTION INTRAMUSCULAR EVERY 4 HOURS PRN
Status: DISCONTINUED | OUTPATIENT
Start: 2020-07-31 | End: 2020-07-31

## 2020-07-31 RX ORDER — SODIUM CHLORIDE 9 MG/ML
50 INJECTION, SOLUTION INTRAVENOUS ONCE
Status: COMPLETED | OUTPATIENT
Start: 2020-07-31 | End: 2020-07-31

## 2020-07-31 RX ADMIN — VALPROATE SODIUM 500 MG: 100 INJECTION, SOLUTION INTRAVENOUS at 18:00

## 2020-07-31 RX ADMIN — POTASSIUM CHLORIDE 40 MEQ: 1500 TABLET, EXTENDED RELEASE ORAL at 10:15

## 2020-07-31 RX ADMIN — VALPROATE SODIUM 600 MG: 100 INJECTION, SOLUTION INTRAVENOUS at 14:25

## 2020-07-31 RX ADMIN — INSULIN GLARGINE 5 UNITS: 100 INJECTION, SOLUTION SUBCUTANEOUS at 21:43

## 2020-07-31 RX ADMIN — BACLOFEN 10 MG: 10 TABLET ORAL at 08:36

## 2020-07-31 RX ADMIN — INSULIN LISPRO 3 UNITS: 100 INJECTION, SOLUTION INTRAVENOUS; SUBCUTANEOUS at 21:43

## 2020-07-31 RX ADMIN — ENOXAPARIN SODIUM 30 MG: 30 INJECTION SUBCUTANEOUS at 08:38

## 2020-07-31 RX ADMIN — INSULIN LISPRO 3 UNITS: 100 INJECTION, SOLUTION INTRAVENOUS; SUBCUTANEOUS at 12:17

## 2020-07-31 RX ADMIN — SODIUM CHLORIDE 50 ML/HR: 0.9 INJECTION, SOLUTION INTRAVENOUS at 18:01

## 2020-07-31 RX ADMIN — INSULIN LISPRO 2 UNITS: 100 INJECTION, SOLUTION INTRAVENOUS; SUBCUTANEOUS at 07:47

## 2020-07-31 RX ADMIN — LEVOTHYROXINE SODIUM 25 MCG: 25 TABLET ORAL at 05:12

## 2020-07-31 RX ADMIN — OXYBUTYNIN 5 MG: 5 TABLET, FILM COATED, EXTENDED RELEASE ORAL at 08:37

## 2020-07-31 RX ADMIN — DOCUSATE SODIUM 100 MG: 100 CAPSULE, LIQUID FILLED ORAL at 08:37

## 2020-07-31 RX ADMIN — CEFTRIAXONE SODIUM 1000 MG: 10 INJECTION, POWDER, FOR SOLUTION INTRAVENOUS at 12:17

## 2020-07-31 RX ADMIN — INSULIN LISPRO 3 UNITS: 100 INJECTION, SOLUTION INTRAVENOUS; SUBCUTANEOUS at 10:21

## 2020-07-31 NOTE — ASSESSMENT & PLAN NOTE
· Patient is afebrile today and does not have leukocytosis    · Final urine culture shows Enterobacter that is resistant to amoxicillin, ampicillin and cefazolin    · Will continue patient on IV Ancef  · Monitor Vital signs, CBC,

## 2020-07-31 NOTE — ASSESSMENT & PLAN NOTE
Lab Results   Component Value Date    HGBA1C 6 6 (H) 03/12/2020       Recent Labs     07/30/20  2109 07/31/20  0716 07/31/20  1113 07/31/20  1215   POCGLU 294* 334* 323* 350*       Blood Sugar Average: Last 72 hrs:  (P) 226 8     · Patient's average blood sugars are last 24 hours have been greater than 300  · Will start patient on 5 units of Lantus today, and continue sliding scale for meals  · Monitor POCT glucose  · Will monitor for Hypoglycemia

## 2020-07-31 NOTE — PROGRESS NOTES
Progress Note - Artur Montana 1960, 64 y o  female MRN: [id number]    Unit/Bed#: S -01 Encounter: [id number]    Primary Care Provider: Eladio Moser DO   Date and time admitted to hospital: 7/26/2020 11:21 AM        * Toxic metabolic encephalopathy  Assessment & Plan  · Patient improved throughout the day yesterday  Was responding well and was not lethargic during morning rounds  · Patient was baseline this morning as well, however developed an episode of unresponsiveness followed by quick recovery  · Due to unresponsiveness and lethargy a rapid response was called  · No intervention was needed as patient was hemodynamically stable and alert after initiation of rapid  · Neurology was consulted, for possible seizure activity in patient  · It is likely that this is due to sleep-wake cycle, as mentioned by Neurology during initial consult  Patient did have episodes of lethargy during mornings and would only wake to sternal rubs, and and rapidly fall back to sleep  · Patient has been evaluated throughout the day, and has been alert and tracking individuals while in room  Patient responds to tactile stimulus on feet  During my examinations today patient has been close to hospital baseline that was established with caretaker on Wednesday, 07/29/2020  Plan:   -per neurology we will obtain EEG to identify any seizure activity    -continue treatment with IV antibiotics  Will discontinue Monday after a total of 7 days  Acute cystitis without hematuria  Assessment & Plan  · Patient is afebrile today and does not have leukocytosis    · Final urine culture shows Enterobacter that is resistant to amoxicillin, ampicillin and cefazolin    · Will continue patient on IV Ancef  · Monitor Vital signs, CBC,           Cerebral palsy (HCC)  Assessment & Plan  · Mentates at baseline  · Has muscular rigidity secondary to cerebral palsy  · Frequent repositioning  · Patient was then able to take some medications yesterday as she was having trouble swallowing  · Patient had difficulty swallowing yesterday, was made NPO after speech and swallow eval off, and GI was consulted  · Patient's mental status and suspicious improved significantly since yesterday today  Patient was re-evaluated by speech and swallow, and diet was changed to dysphagia level 1 with puree diet and honey thick liquids  Plan:  · Patient will be NPO today as per speech and swallow recommendations  Type 2 diabetes mellitus, without long-term current use of insulin [facility])  Assessment & Plan  Lab Results   Component Value Date    HGBA1C 6 6 (H) 03/12/2020       Recent Labs     07/30/20  2109 07/31/20  0716 07/31/20  1113 07/31/20  1215   POCGLU 294* 334* 323* 350*       Blood Sugar Average: Last 72 hrs:  (P) 226 8     · Patient's average blood sugars are last 24 hours have been greater than 300  · Will start patient on 5 units of Lantus today, and continue sliding scale for meals  · Monitor POCT glucose  · Will monitor for Hypoglycemia    Hypothyroidism  Assessment & Plan  · Most recent TSH in March- 2 6  · Is on Synthroid 25mcg      Hypernatremia  Assessment & Plan  Patient has Na level of 142 today  The group facility she is from states that patient tends to become hypernatremic      -patient received 500 cc of half normal saline yesterday  -Monitor Na levels     Hypokalemia  Assessment & Plan  Patient has potassium of 3 3 today, has not stabilized since yesterday   -patient given 40 of K-Dur  -if patient continues to have low potassium will give IV KCl 20 mEq tomorrow  VTE Pharmacologic Prophylaxis:   Pharmacologic: Enoxaparin (Lovenox)  Mechanical VTE Prophylaxis in Place: Yes    Discussions with Specialists or Other Care Team Provider:  Neurology, GI    Education and Discussions with Family / Patient:  Was not able to reach [facility], the caretaker today    Tried to call both the group home, and caretaker cell phone multiple times throughout the day  Current Length of Stay: 5 day(s)    Current Patient Status: Inpatient     Discharge Plan / Estimated Discharge Date:  Monday or Tuesday pending results of EEG  Code Status: Level 1 - Full Code      Subjective:   Patient is awake and alert, and is tracking  Is able to follow simple commands  Is able to track her toy  Objective:     Vitals:   Temp (24hrs), Av °F (36 7 °C), Min:97 6 °F (36 4 °C), Max:98 6 °F (37 °C)    Temp:  [97 6 °F (36 4 °C)-98 6 °F (37 °C)] 98 6 °F (37 °C)  HR:  [] 112  Resp:  [16-18] 18  BP: (111-139)/(69-94) 119/81  SpO2:  [92 %-96 %] 95 %  Body mass index is 18 11 kg/m²  Input and Output Summary (last 24 hours): Intake/Output Summary (Last 24 hours) at 2020 1500  Last data filed at 2020 0737  Gross per 24 hour   Intake 0 ml   Output 601 ml   Net -601 ml       Physical Exam:     Physical Exam   Constitutional: She is oriented to person, place, and time  She appears well-developed  No distress  HENT:   Head: Normocephalic and atraumatic  Eyes: Right eye exhibits no discharge  Left eye exhibits no discharge  No scleral icterus  Cardiovascular: Normal rate, regular rhythm and normal heart sounds  Exam reveals no friction rub  No murmur heard  Pulmonary/Chest: Effort normal and breath sounds normal  No respiratory distress  She has no wheezes  Abdominal: Soft  Bowel sounds are normal  She exhibits no distension  There is no tenderness  Neurological: She is alert and oriented to person, place, and time  Skin: Skin is warm and dry  No erythema  No pallor             Additional Data:     Labs:    Results from last 7 days   Lab Units 20  1042 20  0507   WBC Thousand/uL 6 50 5 30   HEMOGLOBIN g/dL 13 7 14 0   HEMATOCRIT % 39 8 40 0   PLATELETS Thousands/uL 142* 143*   NEUTROS PCT %  --  53   LYMPHS PCT %  --  34   MONOS PCT %  --  12   EOS PCT %  --  0     Results from last 7 days   Lab Units 20  6112 07/26/20  1153   POTASSIUM mmol/L 3 3*   < > 4 3   CHLORIDE mmol/L 107   < > 104   CO2 mmol/L 31   < > 33*   BUN mg/dL 10   < > 14   CREATININE mg/dL 1 02   < > 1 19   CALCIUM mg/dL 8 0*   < > 10 1   ALK PHOS U/L  --   --  76   ALT U/L  --   --  39   AST U/L  --   --  24    < > = values in this interval not displayed  Results from last 7 days   Lab Units 07/26/20  1153   INR  0 97       * I Have Reviewed All Lab Data Listed Above  * Additional Pertinent Lab Tests Reviewed: All Labs Within Last 24 Hours Reviewed    Imaging:    Imaging Reports Reviewed Today Include:   Imaging Personally Reviewed by Myself Includes:      Recent Cultures (last 7 days):     Results from last 7 days   Lab Units 07/26/20  1302 07/26/20  1237 07/26/20  1153   BLOOD CULTURE   --  No Growth After 4 Days  No Growth After 4 Days     URINE CULTURE  >100,000 cfu/ml Enterobacter aerogenes*  --   --        Last 24 Hours Medication List:     Current Facility-Administered Medications:  acetaminophen 650 mg Oral Q6H PRN Kimi Haskins MD    ARIPiprazole 30 mg Oral HS Kimi Haskins MD    artificial tear  Both Eyes HS PRN Kimi Haskins MD    baclofen 10 mg Oral TID Kimi Haskins MD    calcium carbonate 500 mg Oral BID With Meals Kimi Haskins MD    cefTRIAXone 1,000 mg Intravenous Q24H Luisito Ron MD Last Rate: 1,000 mg (07/31/20 1217)   cholecalciferol 2,000 Units Oral Daily Kimi Haskins MD    citalopram 40 mg Oral QAM Kimi Haskins MD    divalproex sodium 500 mg Oral Q12H [address] Sky Shannon PA-C    docusate sodium 100 mg Oral BID Kimi Haskins MD    enoxaparin 30 mg Subcutaneous Daily Kimi Haskins MD    famotidine 20 mg Oral Daily Kimi Haskins MD    fluticasone 1 spray Nasal Daily PRN Kimi Haskins MD    guaiFENesin 600 mg Oral BID PRN Kimi Haskins MD    insulin glargine 5 Units Subcutaneous HS Luisito Ron MD    insulin lispro 1-5 Units Subcutaneous TID AC Kimi Haskins MD    insulin lispro 1-5 Units Subcutaneous HS Areli Trujillo MD    levothyroxine 25 mcg Oral Early Morning Areli Trujillo MD    ondansetron 4 mg Intravenous Q6H PRN Areli Trujillo MD    oxybutynin 5 mg Oral TID Areli Trujillo MD    polyethylene glycol 17 g Oral QAM Areli Trujillo MD    pravastatin 20 mg Oral Daily With David Meng MD    sodium chloride (PF) 3 mL Intravenous Q1H PRN Margot Kingsley MD    traZODone 50 mg Oral HS Armando Pathak MD    valproate sodium 600 mg Intravenous Once Aden Waller PA-C Last Rate: 600 mg (07/31/20 1425)        Today, Patient Was Seen By: Armando Pathak MD    ** Please Note: This note has been constructed using a voice recognition system   **

## 2020-07-31 NOTE — SPEECH THERAPY NOTE
Speech Language/Pathology    Speech/Language Pathology Progress Note    Patient Name: Swati Reyes  PPSNQ'F Date: 7/31/2020     Problem List  Principal Problem:    Toxic metabolic encephalopathy  Active Problems:    Cerebral palsy ([facility] )    Type 2 diabetes mellitus, without long-term current use of insulin (HCC)    Hypothyroidism    Hypernatremia    Acute cystitis without hematuria    Decreased appetite    Hypokalemia       Past Medical History  Past Medical History:   Diagnosis Date    Adjustment disorder     Altered mental status 12/11/2015    Anemia     Bipolar 1 disorder ([facility] )     Cerebral palsy ([facility] )     Chronic hypernatremia 2/6/2016    Closed fracture of left hip ([facility] ) 1/19/2016    Closed left hip fracture (HCC)     no surgery    Constipation     Dehydration 2/20/2016    Diabetes mellitus ([facility] )     Disease of thyroid gland     Diverticulosis     Fracture of multiple ribs of right side     Hip fracture ([facility] ) 07/26/2015    left    Hyperlipidemia     Hypernatremia 12/28/2018    Hypotension     Impulse control disorder     Incontinence     Intellectual disability due to developmental disorder, unspecified     Microalbuminuria     Osteopathia     Osteoporosis     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HCC)     Thrombocytopenia ([facility] ) 7/31/2015        Past Surgical History  Past Surgical History:   Procedure Laterality Date    ABDOMINAL SURGERY      COLECTOMY MIN      re-anastomosis 7/22/16    COLONOSCOPY N/A 7/21/2016    Procedure: COLONOSCOPY;  Surgeon: Isaac Hall MD;  [facility]; Service:     COLONOSCOPY N/A 1/31/2016    Procedure: COLONOSCOPY;  Surgeon: Isaac Hall MD;  [facility];  Service:     COLONOSCOPY N/A 7/25/2017    Procedure: COLONOSCOPY;  Surgeon: Isaac Hall MD;  [facility];   Service: Colorectal    COLOSTOMY      EXPLORATORY LAPAROTOMY W/ BOWEL RESECTION N/A 1/31/2016    Procedure: exploratory laparotomy, left sigmoidectomy, coloproctostomy, take down splenic flexure, loop colostomy;  Surgeon: Jorge Alberto Gallego MD;  [facility];  Service:     [place] CLOSE ENTEROSTOMY N/A 7/22/2016    Procedure: SEGMENTAL COLECTOMY WITH COLOCOLOSTOMY;  Surgeon: Jorge Alberto Gallego MD;  [facility];  Service: Colorectal         Subjective:  Pt awake and sitting upright in bed staring at wall  Objective:  Pt seen for ongoing PO tolerance  Pt had be upgraded to level 1 puree with HTL but this morning, pt was a rapid response for acute change in mental status with questionable seizure  After rapid, pt questionable for post-ictal  Pt now awake and aware of SLP presence  When presented c small trials of HTL, pt opened mouth to retrieve but had weak retrieval and no attempts to manipulate/transfer bolus  Pt required oral suction to clear material  Pt not steven for PO intake at this time  RN made aware  Assessment:  Pt c oral holding and no attempts to transfer material, unsafe for PO intake       Plan/Recommendations:  NPO  SLP to reassess tomorrow for PO tolerance

## 2020-07-31 NOTE — RAPID RESPONSE
Progress Note - Rapid Response   Annie Butler 64 y o  female MRN: [id number]    Time Called ( Time): 0102  Date Called: 7/31/2020  Level of Care: MS  Room#: 909  VMMAJLM Time ( Time): 7010  Event End Time ( Time): 8076  Primary reason for call: Acute change in mental status  Interventions:  Airway/Breathing:  No Intervention  Circulation: N/A  Other Treatments: N/A       Assessment:   1  POssible Seizure    Plan:   · Dr Annalise Islas at bedside, discussion held and suspect patient may have had a seizure  She will discuss w/ neuro about possible spot EEG vs cont EEG  No intervention at this time  Neuro checks       HPI/Chief Complaint (Background/Situation):   Annie Butler is a 64y o  year old female who presents with AMS from group home  She is being evaluated currently for AMS and per reports has been showing improvement per nursing  Currently pt is awake and alert, tracking with eyes which is reported to be her baseline mentation  Question if she is currently post-ictal, SlIM to discuss plan with neurology  Ok to leave in room at this time      Historical Information   Past Medical History:   Diagnosis Date    Adjustment disorder     Altered mental status 12/11/2015    Anemia     Bipolar 1 disorder (HCC)     Cerebral palsy ([address] )     Chronic hypernatremia 2/6/2016    Closed fracture of left hip ([address] ) 1/19/2016    Closed left hip fracture (HCC)     no surgery    Constipation     Dehydration 2/20/2016    Diabetes mellitus ([address] )     Disease of thyroid gland     Diverticulosis     Fracture of multiple ribs of right side     Hip fracture ([address] ) 07/26/2015    left    Hyperlipidemia     Hypernatremia 12/28/2018    Hypotension     Impulse control disorder     Incontinence     Intellectual disability due to developmental disorder, unspecified     Microalbuminuria     Osteopathia     Osteoporosis     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HCC)     Thrombocytopenia ([address] ) 7/31/2015     Past Surgical History:   Procedure Laterality Date    ABDOMINAL SURGERY      COLECTOMY MIN      re-anastomosis 7/22/16    COLONOSCOPY N/A 7/21/2016    Procedure: COLONOSCOPY;  Surgeon: Laura Herring MD;  [facility]; Service:     COLONOSCOPY N/A 1/31/2016    Procedure: COLONOSCOPY;  Surgeon: Laura Herring MD;  [facility];  Service:     COLONOSCOPY N/A 7/25/2017    Procedure: COLONOSCOPY;  Surgeon: Laura Herring MD;  [facility];   Service: Colorectal    COLOSTOMY      EXPLORATORY LAPAROTOMY W/ BOWEL RESECTION N/A 1/31/2016    Procedure: exploratory laparotomy, left sigmoidectomy, coloproctostomy, take down splenic flexure, loop colostomy;  Surgeon: Laura Herring MD;  [facility];  Service:     IN CLOSE ENTEROSTOMY N/A 7/22/2016    Procedure: SEGMENTAL COLECTOMY WITH COLOCOLOSTOMY;  Surgeon: Laura Herring MD;  [facility];  Service: Colorectal     Social History   Social History     Substance and Sexual Activity   Alcohol Use No    Frequency: Never    Binge frequency: Never     Social History     Substance and Sexual Activity   Drug Use No     Social History     Tobacco Use   Smoking Status Never Smoker   Smokeless Tobacco Never Used     Family History: non-contributory    Meds/Allergies     Current Facility-Administered Medications:  acetaminophen 650 mg Oral Q6H PRN Alysa Ramírez MD    ARIPiprazole 30 mg Oral HS Alysa Ramírez MD    artificial tear  Both Eyes HS PRN Alysa Ramírez MD    baclofen 10 mg Oral TID Alysa Ramírez MD    calcium carbonate 500 mg Oral BID With Meals Alysa Ramírez MD    cefTRIAXone 1,000 mg Intravenous Q24H Dana Gregory MD Last Rate: 1,000 mg (07/31/20 1217)   cholecalciferol 2,000 Units Oral Daily Alysa Ramírez MD    citalopram 40 mg Oral QAM Alysa Ramírez MD    divalproex sodium 500 mg Oral Q12H [facility] Sofie Dial PA-C    docusate sodium 100 mg Oral BID Alysa Ramírez MD    enoxaparin 30 mg Subcutaneous Daily Areli Trujillo MD    famotidine 20 mg Oral Daily Areli Trujillo MD    fluticasone 1 spray Nasal Daily PRN Areli Trujillo MD    guaiFENesin 600 mg Oral BID PRN Areli Trujillo MD    insulin glargine 5 Units Subcutaneous HS Armando Pathak MD    insulin lispro 1-5 Units Subcutaneous TID AC Areli Trujillo MD    insulin lispro 1-5 Units Subcutaneous HS Areli Trujillo MD    levothyroxine 25 mcg Oral Early Morning Areli Trujillo MD    ondansetron 4 mg Intravenous Q6H PRN Areli Trujillo MD    oxybutynin 5 mg Oral TID Areli Trujillo MD    polyethylene glycol 17 g Oral QAM rAeli Trujillo MD    pravastatin 20 mg Oral Daily With David Meng MD    sodium chloride (PF) 3 mL Intravenous Q1H PRN Ozzy Patel MD    traZODone 50 mg Oral HS Armando Pathak MD    valproate sodium 600 mg Intravenous Once [employer], PA-C Last Rate: 600 mg (07/31/20 1425)            No Known Allergies    ROS: Review of Systems   Unable to perform ROS: Patient nonverbal     Vitals: 119- 134/87- 20- 95%    Physical Exam:  Physical Exam   Constitutional: She appears well-nourished  No distress  HENT:   Head: Normocephalic and atraumatic  Eyes: Pupils are equal, round, and reactive to light  Neck: Normal range of motion  Cardiovascular: Regular rhythm  Tachycardia present  Pulmonary/Chest: Effort normal and breath sounds normal  No respiratory distress  Abdominal: Soft  Bowel sounds are normal  She exhibits no distension  Musculoskeletal: She exhibits no edema  Neurological: She is alert  GCS eye subscore is 4  GCS verbal subscore is 1  GCS motor subscore is 4  Skin: Skin is warm and dry  Capillary refill takes less than 2 seconds  She is not diaphoretic  There is pallor  Nursing note and vitals reviewed        Intake/Output Summary (Last 24 hours) at 7/31/2020 1507  Last data filed at 7/31/2020 0737  Gross per 24 hour   Intake 0 ml   Output 601 ml   Net -601 ml Respiratory    Lab Data (Last 4 hours)    None         O2/Vent Data (Last 4 hours)    None              Invasive Devices     Peripheral Intravenous Line            Peripheral IV 07/26/20 Right Antecubital 5 days                DIAGNOSTIC DATA:    Lab: I have personally reviewed pertinent lab results  CBC:   Results from last 7 days   Lab Units 07/30/20  1042   WBC Thousand/uL 6 50   HEMOGLOBIN g/dL 13 7   HEMATOCRIT % 39 8   PLATELETS Thousands/uL 142*     CMP:   Results from last 7 days   Lab Units 07/31/20  0451 07/30/20  1042 07/29/20  0507  07/26/20  1153   POTASSIUM mmol/L 3 3* 3 2* 3 5   < > 4 3   CHLORIDE mmol/L 107 105 108   < > 104   CO2 mmol/L 31 25 26   < > 33*   BUN mg/dL 10 13 15   < > 14   CREATININE mg/dL 1 02 0 85 0 99   < > 1 19   CALCIUM mg/dL 8 0* 7 9* 8 2*   < > 10 1   ALK PHOS U/L  --   --   --   --  76   ALT U/L  --   --   --   --  39   AST U/L  --   --   --   --  24    < > = values in this interval not displayed  PT/INR:   No results found for: PT, INR,   Magnesium: No components found for: MAG,   Phosphorous: No results found for: PHOS    Microbiology:  Lab Results   Component Value Date    BLOODCX No Growth After 4 Days  07/26/2020    BLOODCX No Growth After 4 Days  07/26/2020    BLOODCX No Growth After 5 Days  04/08/2019    URINECX >100,000 cfu/ml Enterobacter aerogenes (A) 07/26/2020    URINECX >100,000 cfu/ml Enterobacter aerogenes (A) 07/22/2020    URINECX >100,000 cfu/ml Enterobacter aerogenes (A) 07/22/2020         OUTCOME:   Stayed in room   Family notified of transfer: no  Family member contacted: no  Code Status: Level 1 - Full Code  Critical Care Time: Total Critical Care time spent 0 minutes excluding procedures, teaching and family updates

## 2020-07-31 NOTE — PLAN OF CARE
Problem: Potential for Falls  Goal: Patient will remain free of falls  Description  INTERVENTIONS:  - Assess patient frequently for physical needs  -  Identify cognitive and physical deficits and behaviors that affect risk of falls    -  [place] fall precautions as indicated by assessment   - Educate patient/family on patient safety including physical limitations  - Instruct patient to call for assistance with activity based on assessment  - Modify environment to reduce risk of injury  - Consider OT/PT consult to assist with strengthening/mobility  Outcome: Progressing     Problem: Prexisting or High Potential for Compromised Skin Integrity  Goal: Skin integrity is maintained or improved  Description  INTERVENTIONS:  - Identify patients at risk for skin breakdown  - Assess and monitor skin integrity  - Assess and monitor nutrition and hydration status  - Monitor labs   - Assess for incontinence   - Turn and reposition patient  - Assist with mobility/ambulation  - Relieve pressure over bony prominences  - Avoid friction and shearing  - Provide appropriate hygiene as needed including keeping skin clean and dry  - Evaluate need for skin moisturizer/barrier cream  - Collaborate with interdisciplinary team   - Patient/family teaching  - Consider wound care consult   Outcome: Progressing     Problem: PAIN - ADULT  Goal: Verbalizes/displays adequate comfort level or baseline comfort level  Description  Interventions:  - Encourage patient to monitor pain and request assistance  - Assess pain using appropriate pain scale  - Administer analgesics based on type and severity of pain and evaluate response  - Implement non-pharmacological measures as appropriate and evaluate response  - Consider cultural and social influences on pain and pain management  - Notify physician/advanced practitioner if interventions unsuccessful or patient reports new pain  Outcome: Progressing     Problem: INFECTION - ADULT  Goal: Absence or prevention of progression during hospitalization  Description  INTERVENTIONS:  - Assess and monitor for signs and symptoms of infection  - Monitor lab/diagnostic results  - Monitor all insertion sites, i e  indwelling lines, tubes, and drains  - Monitor endotracheal if appropriate and nasal secretions for changes in amount and color  - [place] appropriate cooling/warming therapies per order  - Administer medications as ordered  - Instruct and encourage patient and family to use good hand hygiene technique  - Identify and instruct in appropriate isolation precautions for identified infection/condition  Outcome: Progressing     Problem: SAFETY ADULT  Goal: Patient will remain free of falls  Description  INTERVENTIONS:  - Assess patient frequently for physical needs  -  Identify cognitive and physical deficits and behaviors that affect risk of falls    -  [place] fall precautions as indicated by assessment   - Educate patient/family on patient safety including physical limitations  - Instruct patient to call for assistance with activity based on assessment  - Modify environment to reduce risk of injury  - Consider OT/PT consult to assist with strengthening/mobility  Outcome: Progressing  Goal: Maintain or return to baseline ADL function  Description  INTERVENTIONS:  -  Assess patient's ability to carry out ADLs; assess patient's baseline for ADL function and identify physical deficits which impact ability to perform ADLs (bathing, care of mouth/teeth, toileting, grooming, dressing, etc )  - Assess/evaluate cause of self-care deficits   - Assess range of motion  - Assess patient's mobility; develop plan if impaired  - Assess patient's need for assistive devices and provide as appropriate  - Encourage maximum independence but intervene and supervise when necessary  - Involve family in performance of ADLs  - Assess for home care needs following discharge   - Consider OT consult to assist with ADL evaluation and planning for discharge  - Provide patient education as appropriate  Outcome: Progressing  Goal: Maintain or return mobility status to optimal level  Description  INTERVENTIONS:  - Assess patient's baseline mobility status (ambulation, transfers, stairs, etc )    - Identify cognitive and physical deficits and behaviors that affect mobility  - Identify mobility aids required to assist with transfers and/or ambulation (gait belt, sit-to-stand, lift, walker, cane, etc )  - [place] fall precautions as indicated by assessment  - Record patient progress and toleration of activity level on Mobility SBAR; progress patient to next Phase/Stage  - Instruct patient to call for assistance with activity based on assessment  - Consider rehabilitation consult to assist with strengthening/weightbearing, etc   Outcome: Progressing     Problem: DISCHARGE PLANNING  Goal: Discharge to home or other facility with appropriate resources  Description  INTERVENTIONS:  - Identify barriers to discharge w/patient and caregiver  - Arrange for needed discharge resources and transportation as appropriate  - Identify discharge learning needs (meds, wound care, etc )  - Arrange for interpretive services to assist at discharge as needed  - Refer to Case Management Department for coordinating discharge planning if the patient needs post-hospital services based on physician/advanced practitioner order or complex needs related to functional status, cognitive ability, or social support system  Outcome: Progressing     Problem: Knowledge Deficit  Goal: Patient/family/caregiver demonstrates understanding of disease process, treatment plan, medications, and discharge instructions  Description  Complete learning assessment and assess knowledge base    Interventions:  - Provide teaching at level of understanding  - Provide teaching via preferred learning methods  Outcome: Progressing     Problem: Nutrition/Hydration-ADULT  Goal: Nutrient/Hydration intake appropriate for improving, restoring or maintaining nutritional needs  Description  Monitor and assess patient's nutrition/hydration status for malnutrition  Collaborate with interdisciplinary team and initiate plan and interventions as ordered  Monitor patient's weight and dietary intake as ordered or per policy  Utilize nutrition screening tool and intervene as necessary  Determine patient's food preferences and provide high-protein, high-caloric foods as appropriate  INTERVENTIONS:  - Monitor oral intake, urinary output, labs, and treatment plans  - Assess nutrition and hydration status and recommend course of action  - Evaluate amount of meals eaten  - Assist patient with eating if necessary   - Allow adequate time for meals  - Recommend/ encourage appropriate diets, oral nutritional supplements, and vitamin/mineral supplements  - Order, calculate, and assess calorie counts as needed  - Recommend, monitor, and adjust tube feedings and TPN/PPN based on assessed needs  - Assess need for intravenous fluids  - Provide specific nutrition/hydration education as appropriate  - Include patient/family/caregiver in decisions related to nutrition  Outcome: Progressing     Problem: NEUROSENSORY - ADULT  Goal: Achieves stable or improved neurological status  Description  INTERVENTIONS  - Monitor and report changes in neurological status  - Monitor vital signs such as temperature, blood pressure, glucose, and any other labs ordered   - Initiate measures to prevent increased intracranial pressure  - Monitor for seizure activity and implement precautions if appropriate      Outcome: Not Progressing  Goal: Achieves maximal functionality and self care  Description  INTERVENTIONS  - Monitor swallowing and airway patency with patient fatigue and changes in neurological status  - Encourage and assist patient to increase activity and self care     - Encourage visually impaired, hearing impaired and aphasic patients to use assistive/communication devices  Outcome: Not Progressing

## 2020-07-31 NOTE — UTILIZATION REVIEW
Continued Stay Review    Date: 7/31/20                          Current Patient Class: inpatient  Current Level of Care: med surg    HPI:60 y o  female group home resident w/hx cerebral palsy, dm, and hypothyroid initially admitted on 7/26/20 as inpatient due to acute cystitis with encephalopathy  IVF, IV antbx were in progress  Assessment/Plan: 7/28: lethargic, opens eyes to name  Urine growing klebsiella and enterobacter  Transitioned to PO cefdinir  Holding sedatives  7/29: guardian is concerned and feels mental status is further declining  Did become more responsive to her name, but remained lethargic  Changed antbx to IV ceftriaxone  Needs further eval for dyaphagia  Neuro consulted  By the evening, no longer lethargic and following commands & answering questions when caretaker visited  Per neuro: not following commands, but holding a small plastic bear  When took it from her hand, she followed it but didn't pick it up  Wouldn't squeeze when the bear was placed back in her hand  Had low amplitude LUE tremor during noxious stim to BLE, grimaced while checking plantar reflexes  Suspect dysphagia related to UTI encephalopathy superimposed on her cerebral palsy  Nothing to suggest myasthenia gravis  7/30:more awake, felt to be back at baseline per caretaker  GI consulted: dysphagia ongoing x 2 days  Speech eval done earlier and pt had improved PO tolerance with conservative consistency  Pureed honey thick diet recommended  Her acute dysphagia is likely in setting of urosepsis  It is improving  Consider barium swallow  Will possibly need EGD      7/31: in AM-leaning to the side with drool coming from mouth  arousable only to firm sternal rub  Very lethargic with tongue sticking out  Rapid response team called, reconsult to neuro  Suspected of having had a seizure, as she improved after this episode   She has no prior seizure disorder diagnosed, but her cognitive impairment reduces seizure threshold, making her susceptible  IV depacon started, speech evaluated and changed to NPO  Gentle IVF and neuro checks in progress  Pertinent Labs/Diagnostic Results:   7/29 MRI brain: No acute intracranial abnormality  No restricted diffusion to suggest acute ischemia    Mild chronic small vessel ischemic changes and volume loss    Results from last 7 days   Lab Units 07/31/20  1134 07/26/20  1221   SARS-COV-2  Negative Negative     Results from last 7 days   Lab Units 07/30/20  1042 07/29/20  0507 07/28/20  0712 07/27/20  0441 07/26/20  1153   WBC Thousand/uL 6 50 5 30 6 82 6 77 9 77   HEMOGLOBIN g/dL 13 7 14 0 12 9 11 8 14 2   HEMATOCRIT % 39 8 40 0 37 3 34 4* 41 6   PLATELETS Thousands/uL 142* 143* 128* 126* 156   NEUTROS ABS Thousands/µL  --  2 80  --  3 81 7 04         Results from last 7 days   Lab Units 07/31/20  0451 07/30/20  1042 07/29/20  0507 07/28/20  0712 07/27/20  0441   SODIUM mmol/L 143 143 142 140 146*   POTASSIUM mmol/L 3 3* 3 2* 3 5 3 9 4 5   CHLORIDE mmol/L 107 105 108 107 109*   CO2 mmol/L 31 25 26 27 35*   ANION GAP mmol/L 5 13 8 6 2*   BUN mg/dL 10 13 15 15 12   CREATININE mg/dL 1 02 0 85 0 99 0 93 0 92   EGFR ml/min/1 73sq m 61 76 63 68 69   CALCIUM mg/dL 8 0* 7 9* 8 2* 8 1* 7 9*     Results from last 7 days   Lab Units 07/26/20  1153   AST U/L 24   ALT U/L 39   ALK PHOS U/L 76   TOTAL PROTEIN g/dL 7 9   ALBUMIN g/dL 3 3*   TOTAL BILIRUBIN mg/dL 0 33     Results from last 7 days   Lab Units 07/31/20  1215 07/31/20  1113 07/31/20  0716 07/30/20  2109 07/30/20  1522 07/30/20  1118 07/30/20  0759 07/29/20  2149 07/29/20  1511 07/29/20  1154 07/29/20  0803 07/28/20 2016   POC GLUCOSE mg/dl 350* 323* 334* 294* 280* 200* 186* 224* 206* 205* 170* 225*     Results from last 7 days   Lab Units 07/31/20  0451 07/30/20  1042 07/29/20  0507 07/28/20  0712 07/27/20  0441 07/26/20  1153   GLUCOSE RANDOM mg/dL 299* 196* 141* 101 114 124     Results from last 7 days   Lab Units 07/26/20  1153   TROPONIN I ng/mL <0 02         Results from last 7 days   Lab Units 07/26/20  1153   PROTIME seconds 12 3   INR  0 97   PTT seconds 27         Results from last 7 days   Lab Units 07/26/20 2125 07/26/20  1153   PROCALCITONIN ng/ml 0 56* 1 22*     Results from last 7 days   Lab Units 07/26/20  2125 07/26/20  1702 07/26/20  1153   LACTIC ACID mmol/L 1 5 2 3* 4 1*     Results from last 7 days   Lab Units 07/26/20  1153   LIPASE u/L 164     Results from last 7 days   Lab Units 07/26/20  1302   CLARITY UA  Clear   COLOR UA  Yellow   SPEC GRAV UA  1 020   PH UA  8 0   GLUCOSE UA mg/dl Negative   KETONES UA mg/dl Trace*   BLOOD UA  Small*   PROTEIN UA mg/dl Negative   NITRITE UA  Negative   BILIRUBIN UA  Negative   UROBILINOGEN UA E U /dl 0 2   LEUKOCYTES UA  Large*   WBC UA /hpf Innumerable*   RBC UA /hpf None Seen   BACTERIA UA /hpf Occasional   EPITHELIAL CELLS WET PREP /hpf Occasional     Results from last 7 days   Lab Units 07/26/20  1302 07/26/20  1237 07/26/20  1153   BLOOD CULTURE   --  No Growth After 4 Days  No Growth After 4 Days     URINE CULTURE  >100,000 cfu/ml Enterobacter aerogenes*  --   --    Vital Signs:   Date/Time  Temp  Pulse  Resp  BP  MAP (mmHg)  SpO2  O2 Device  Patient Position - Orthostatic VS   07/31/20 1140    112Abnormal     119/81    95 %  None (Room air)     07/31/20 1112    119Abnormal   18  139/92           07/31/20 1105  98 6 °F (37 °C)      134/87    93 %  None (Room air)  Sitting   07/31/20 0700  97 6 °F (36 4 °C)  106Abnormal   18  139/94  112  92 %  None (Room air)  Lying   07/30/20 2201  97 8 °F (36 6 °C)  95  16  111/69    96 %  None (Room air)  Lying   07/30/20 2030              None (Room air)     07/30/20 1500  97 7 °F (36 5 °C)  95  16  144/84  109  96 %  None (Room air)  Lying   07/30/20 1100  97 6 °F (36 4 °C)  91    154/80  109  97 %  None (Room air)  Lying   07/30/20 0715              None (Room air)     07/30/20 0700  98 1 °F (36 7 °C)  86  14  136/81  103  98 % None (Room air)  Lying   07/29/20 2200  98 °F (36 7 °C)  100  18  130/78    97 %  None (Room air)  Lying   07/29/20 1500  98 1 °F (36 7 °C)  106Abnormal   18  135/87  104  97 %  None (Room air)  Lying   07/29/20 1100  98 7 °F (37 1 °C)  107Abnormal     129/92    100 %  None (Room air)  Lying   07/29/20 0750              None (Room air)           Medications:   Scheduled Medications:    Medications:  ARIPiprazole 30 mg Oral HS   baclofen 10 mg Oral TID   calcium carbonate 500 mg Oral BID With Meals   cefTRIAXone 1,000 mg Intravenous Q24H   cholecalciferol 2,000 Units Oral Daily   citalopram 40 mg Oral QAM   docusate sodium 100 mg Oral BID   enoxaparin 30 mg Subcutaneous Daily   famotidine 20 mg Oral Daily   insulin glargine 5 Units Subcutaneous HS   insulin lispro 1-5 Units Subcutaneous TID AC   insulin lispro 1-5 Units Subcutaneous HS   levothyroxine 25 mcg Oral Early Morning   oxybutynin 5 mg Oral TID   polyethylene glycol 17 g Oral QAM   pravastatin 20 mg Oral Daily With Dinner   traZODone 50 mg Oral HS   valproate sodium 500 mg Intravenous BID   PO kdur x 1 7/31    Continuous IV Infusions:    sodium chloride 0 9 % infusion   Dose: 50 mL/hr  Freq: Once Route: IV  Start: 07/31/20 1700  PRN Meds:    acetaminophen 650 mg Oral Q6H PRN   artificial tear  Both Eyes HS PRN   fluticasone 1 spray Nasal Daily PRN   guaiFENesin 600 mg Oral BID PRN   ondansetron 4 mg Intravenous Q6H PRN   sodium chloride (PF) 3 mL Intravenous Q1H PRN       Discharge Plan: TBD    Network Utilization Review Department  [email or web address]  ATTENTION: Please call with any questions or concerns to [phone] and carefully listen to the prompts so that you are directed to the right person   All voicemails are confidential   [place] all requests for admission clinical reviews, approved or denied determinations and any other requests to dedicated fax number below belonging to the campus where the patient is receiving treatment   List of dedicated fax numbers for the Facilities:  [address] DENIALS (Administrative/Medical Necessity) [phone]   [address] (Maternity/NICU/Pediatrics) [phone]   Fran Boots [phone]   Bobo [facility] [phone]   [place] [phone]   [facility] [phone]   [address] [place] [address] [address] [facility] [phone]   [facility]  [phone]   [address] [facility]  [address] [address] [address] [facility] [phone]

## 2020-07-31 NOTE — ASSESSMENT & PLAN NOTE
· [place] at baseline  · Has muscular rigidity secondary to cerebral palsy  · Frequent repositioning  · Patient was then able to take some medications yesterday as she was having trouble swallowing  · Patient had difficulty swallowing yesterday, was made NPO after speech and swallow eval off, and GI was consulted  · Patient's mental status and suspicious improved significantly since yesterday today  Patient was re-evaluated by speech and swallow, and diet was changed to dysphagia level 1 with puree diet and honey thick liquids  Plan:  · Patient will be NPO today as per speech and swallow recommendations

## 2020-07-31 NOTE — PROGRESS NOTES
Coming into pt room,  Pt head leaning to side, drool coming out of mouth  Only arousable to very firm sternal rub  Vitals taken and within normal limits  charge nurse called to evaluate patient as well  Pt still very lethargic with tongue sticking out of mouth  Group decision to call rapid response  Rapid team evaluated  Critical care suggested neuro for possible EEG monitoring stating she may be post-ictal at this time  Rapid ended  Pt mental status slightly improved  No new orders at this time but will continue to closely monitor    Val Archibald RN

## 2020-07-31 NOTE — PROGRESS NOTES
Progress Note - Neurology   Erika French 64 y o  female [id number]  Unit/Bed#: S /S -01    Assessment:  Erika French is a 64 y o  female with cerebral palsy and spastic quadriparesis/intellectual disability, bipolar 1 disorder, and vascular risk factors, who presented to the ED for generalized weakness and encephalopathy in the setting of UTI  She developed increased lethargy during her admission and dysphagia so Neurology was consulted  MRI brain negative for acute infarction  Myasthenia gravis labs ordered, although low suspicion  Etiology suspected to be secondary to toxic metabolic encephalopathy  Neurology was asked to re-evaluate the patient due to an episode of unresponsiveness this morning, activating a rapid response  Cannot entirely exclude the possibility of seizure, given her risk factor of cerebral palsy, possibly provoked by infectious process  In addition, the patient takes Depakote 1000 mg daily for bipolar disorder, but has not received this medication since 7/27 due to increasing lethargy  Plan:  - Routine EEG pending- may not be able to be completed until Monday  For now, will defer video EEG monitoring, this patient has improved  Will reconsider if patient has subsequent event  - Will provide Depacon load at this time, as patient has not received Depakote in a few days     - Obtain valproic acid-total level after administration of Depacon  Goal range  for adequate seizure protection  - Will also transition the patient from maintenance oral medication to Depakote sprinkles, 500 mg twice daily  If unable to take this, transition to IV Depacon 500 mg twice daily  - Conservative management of delirium - minimize noise, maintain day/night cycle, provide frequent redirection, avoid restraints if possible, etc   - Seizure precautions   - Medical management and supportive care per primary team  Correction of any metabolic or infectious disturbances  Addendum:   Patient remains NPO so Depakote sprinkles transitioned to IV Depacon  Subjective: The patient's nurse went to evaluate the patient this morning and found her in her room with her head leaning to the side and drooling  She was only arousable to firm noxious stimuli  Vitals were within normal limits at the time  She was tachycardic, but per nursing, she does develop that at times  She continued to be lethargic and her tongue was noted to be sticking out of her mouth  A rapid response was activated  Her mental status slowly improved without intervention  Approximately 30 minutes prior to neurology evaluation, nursing reports that there was a significant improvement in her mental status and seems to have returned to baseline  Of note, current nurse has taking care of the patient for the past 3 days and has not seen an episode like this prior to today  At the patient's baseline, she is minimally verbal   On neurologic exam 2 days ago, the patient was able to smile at examiner and track examiner around the room  She could not follow any commands or answer orientation questions  She was awake and alert at time of neurologic evaluation  The patient had been evaluated by Neurology in the past   She was seen by epileptologist, Dr Aldo Sheets, initially in 2016 when she was referred for altered mental status and staring spells  The patient has had EEGs in the past   They typically are abnormal due to continuous low-amplitude background intermixed theta and delta slowing  No electrographic seizures or focal abnormalities have been noted  Per Dr Aldo Sheets in 2016, "Over a series of follow-up visits, it became apparent that the patient Katie Huynh would occur mostly when she was debilitated or ill, and were not typical of epileptic seizures " The patient was taking Depakote 500 mg BID, but had not been prescribed this for seizures   She was prescribed this medication for mood (appears manic and irritable at times per caregiver) and it had been treated by her epileptologist      At time of neurologic evaluation, the patient's mental status has improved  She is awake and alert  She tracks examiner  She responds to tickling of her feet  Past Medical History:   Diagnosis Date    Adjustment disorder     Altered mental status 12/11/2015    Anemia     Bipolar 1 disorder (HCC)     Cerebral palsy ([facility] )     Chronic hypernatremia 2/6/2016    Closed fracture of left hip ([facility] ) 1/19/2016    Closed left hip fracture (HCC)     no surgery    Constipation     Dehydration 2/20/2016    Diabetes mellitus ([facility] )     Disease of thyroid gland     Diverticulosis     Fracture of multiple ribs of right side     Hip fracture ([facility] ) 07/26/2015    left    Hyperlipidemia     Hypernatremia 12/28/2018    Hypotension     Impulse control disorder     Incontinence     Intellectual disability due to developmental disorder, unspecified     Microalbuminuria     Osteopathia     Osteoporosis     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[facility] )     Thrombocytopenia ([facility] ) 7/31/2015     Past Surgical History:   Procedure Laterality Date    ABDOMINAL SURGERY      COLECTOMY MIN      re-anastomosis 7/22/16    COLONOSCOPY N/A 7/21/2016    Procedure: COLONOSCOPY;  Surgeon: Dora Cortez MD;  [facility]; Service:     COLONOSCOPY N/A 1/31/2016    Procedure: COLONOSCOPY;  Surgeon: Dora Cortez MD;  [facility];  Service:     COLONOSCOPY N/A 7/25/2017    Procedure: COLONOSCOPY;  Surgeon: Dora Cortez MD;  [facility];   Service: Colorectal    COLOSTOMY      EXPLORATORY LAPAROTOMY W/ BOWEL RESECTION N/A 1/31/2016    Procedure: exploratory laparotomy, left sigmoidectomy, coloproctostomy, take down splenic flexure, loop colostomy;  Surgeon: Dora Cortez MD;  [facility];  Service:     DC CLOSE ENTEROSTOMY N/A 7/22/2016    Procedure: SEGMENTAL COLECTOMY WITH COLOCOLOSTOMY; Surgeon: Kimberly Parker MD;  [facility];  Service: Colorectal     Family History   Problem Relation Age of Onset    Alcohol abuse Mother     Alcohol abuse Father     Diabetes Sister     No Known Problems Sister     No Known Problems Sister      Social History     Socioeconomic History    Marital status: Single     Spouse name: None    Number of children: None    Years of education: None    Highest education level: None   Occupational History    None   Social Needs    Financial resource strain: None    Food insecurity:     Worry: None     Inability: None    Transportation needs:     Medical: None     Non-medical: None   Tobacco Use    Smoking status: Never Smoker    Smokeless tobacco: Never Used   Substance and Sexual Activity    Alcohol use: No     Frequency: Never     Binge frequency: Never    Drug use: No    Sexual activity: Never   Lifestyle    Physical activity:     Days per week: None     Minutes per session: None    Stress: None   Relationships    Social connections:     Talks on phone: None     Gets together: None     Attends Congregational service: None     Active member of club or organization: None     Attends meetings of clubs or organizations: None     Relationship status: None    Intimate partner violence:     Fear of current or ex partner: None     Emotionally abused: None     Physically abused: None     Forced sexual activity: None   Other Topics Concern    None   Social History Narrative    Lives in a group home          Medications:   All current active meds have been reviewed and current meds:  Scheduled Meds:  Current Facility-Administered Medications:  acetaminophen 650 mg Oral Q6H PRN Yong Parker MD    ARIPiprazole 30 mg Oral HS Yong Parker MD    artificial tear  Both Eyes HS PRN Yong Parker MD    baclofen 10 mg Oral TID Yong Parker MD    calcium carbonate 500 mg Oral BID With Meals Yong Parker MD    cefTRIAXone 1,000 mg Intravenous Q24H Dana Gregory MD Last Rate: 1,000 mg (07/31/20 1217)   cholecalciferol 2,000 Units Oral Daily Alysa Ramírez MD    citalopram 40 mg Oral QAM Alysa Ramírez MD    divalproex sodium 1,000 mg Oral Daily Alysa Ramírez MD    docusate sodium 100 mg Oral BID Alysa Ramírez MD    enoxaparin 30 mg Subcutaneous Daily Alysa Ramírez MD    famotidine 20 mg Oral Daily Alysa Ramírez MD    fluticasone 1 spray Nasal Daily PRN Alysa Ramírez MD    guaiFENesin 600 mg Oral BID PRN Alysa Ramírez MD    insulin glargine 5 Units Subcutaneous HS Dana Gregory MD    insulin lispro 1-5 Units Subcutaneous TID AC Alysa Ramírez MD    insulin lispro 1-5 Units Subcutaneous HS Alysa Ramírez MD    levothyroxine 25 mcg Oral Early Morning Alysa Ramírez MD    ondansetron 4 mg Intravenous Q6H PRN Alysa Ramírez MD    oxybutynin 5 mg Oral TID Alyas Ramírez MD    polyethylene glycol 17 g Oral QAM Alysa Ramírez MD    pravastatin 20 mg Oral Daily With Vargas MD Shane    sodium chloride (PF) 3 mL Intravenous Q1H PRN Melonie Castro MD    traZODone 50 mg Oral HS Dana Gregory MD      Continuous Infusions:   PRN Meds:   acetaminophen    artificial tear    fluticasone    guaiFENesin    ondansetron    sodium chloride (PF)       ROS:   Review of Systems   Unable to perform ROS: Patient nonverbal             Vitals:   /81   Pulse (!) 112   Temp 98 6 °F (37 °C) (Oral)   Resp 18   Ht 4' 10" (1 473 m)   Wt 39 3 kg (86 lb 10 3 oz)   LMP 11/29/2009 (Exact Date)   SpO2 95%   BMI 18 11 kg/m²     Physical Exam:   Physical Exam   Constitutional: She appears well-nourished  No distress  Awake and alert, no acute distress  HENT:   Head: Normocephalic and atraumatic  Right Ear: External ear normal    Left Ear: External ear normal    Nose: Nose normal    Dry mucous membranes   Eyes: Conjunctivae and EOM are normal  Right eye exhibits no discharge  Left eye exhibits no discharge   No scleral icterus  Tracks examiner   Cardiovascular:   Tachycardic   Pulmonary/Chest: Effort normal  No respiratory distress  Musculoskeletal: She exhibits no edema or tenderness  Range of motion difficult to assess   Neurological: She is alert  She exhibits abnormal muscle tone  Finger-nose-finger test: Unable to follow command  Nonverbal at baseline   Skin: Skin is warm and dry  No rash noted  She is not diaphoretic  No erythema  Psychiatric: She is withdrawn  She is noncommunicative  She is inattentive  Nursing note and vitals reviewed  Neurologic Exam     Mental Status   Patient is awake and alert  She is nonverbal at baseline  She does track examiner and giggles with tickling of feet  She is unable to follow any commands  Cranial Nerves     CN II   Visual acuity: (Blinks to threat bilaterally)    CN III, IV, VI   Extraocular motions are normal    Right pupil: Shape: regular  Reactivity: brisk  Left pupil: Shape: regular  Reactivity: brisk  Conjugate gaze: present    CN VII   Facial expression full, symmetric  Cranial nerve testing limited by patient's baseline mental status  Motor Exam   Moves all 4 extremities spontaneously  Increased tone x 4 extremities  Sensory Exam   Sensory exam unreliable  Gait, Coordination, and Reflexes     Gait  Gait: (Deferred)    Coordination   Finger-nose-finger test: Unable to follow command  Tremor   Resting tremor: absent  Intention tremor: absent  Action tremor: absent    Reflexes   Right plantar: equivocal  Left plantar: upgoing          Labs: I have personally reviewed pertinent reports     Recent Results (from the past 24 hour(s))   Fingerstick Glucose (POCT)    Collection Time: 07/30/20  3:22 PM   Result Value Ref Range    POC Glucose 280 (H) 65 - 140 mg/dl   Fingerstick Glucose (POCT)    Collection Time: 07/30/20  9:09 PM   Result Value Ref Range    POC Glucose 294 (H) 65 - 140 mg/dl   Basic metabolic panel    Collection Time: 07/31/20 4:51 AM   Result Value Ref Range    Sodium 143 136 - 145 mmol/L    Potassium 3 3 (L) 3 5 - 5 3 mmol/L    Chloride 107 100 - 108 mmol/L    CO2 31 21 - 32 mmol/L    ANION GAP 5 4 - 13 mmol/L    BUN 10 5 - 25 mg/dL    Creatinine 1 02 0 60 - 1 30 mg/dL    Glucose 299 (H) 65 - 140 mg/dL    Calcium 8 0 (L) 8 3 - 10 1 mg/dL    eGFR 61 ml/min/1 73sq m   Fingerstick Glucose (POCT)    Collection Time: 07/31/20  7:16 AM   Result Value Ref Range    POC Glucose 334 (H) 65 - 140 mg/dl   Fingerstick Glucose (POCT)    Collection Time: 07/31/20 11:13 AM   Result Value Ref Range    POC Glucose 323 (H) 65 - 140 mg/dl   Novel Coronavirus (Covid-19),PCR [facility]    Collection Time: 07/31/20 11:34 AM   Result Value Ref Range    SARS-CoV-2 Negative Negative   Fingerstick Glucose (POCT)    Collection Time: 07/31/20 12:15 PM   Result Value Ref Range    POC Glucose 350 (H) 65 - 140 mg/dl       Imaging: I have personally reviewed pertinent imaging in PACS, including MRI,  and I have personally reviewed PACS reports  EKG, Pathology, and Other Studies: I have personally reviewed pertinent reports  VTE Prophylaxis: Enoxaparin (Lovenox)      Counseling / Coordination of Care  Total time spent today 25 minutes  Greater than 50% of total time was spent with the patient and/or family counseling and/or coordination of care  A description of the counseling/coordination of care:  Patient was seen and evaluated  Discussed in detail with attending neurologist and patient's nurse- unable to discuss with patient given baseline mental status  Chart reviewed thoroughly including laboratory and imaging studies    Plan of care discussed with  primary team

## 2020-07-31 NOTE — ASSESSMENT & PLAN NOTE
Patient has potassium of 3 3 today, has not stabilized since yesterday   -patient given 40 of K-Dur  -if patient continues to have low potassium will give IV KCl 20 mEq tomorrow

## 2020-07-31 NOTE — ASSESSMENT & PLAN NOTE
· Patient improved throughout the day yesterday  Was responding well and was not lethargic during morning rounds  · Patient was baseline this morning as well, however developed an episode of unresponsiveness followed by quick recovery  · Due to unresponsiveness and lethargy a rapid response was called  · No intervention was needed as patient was hemodynamically stable and alert after initiation of rapid  · Neurology was consulted, for possible seizure activity in patient  · It is likely that this is due to sleep-wake cycle, as mentioned by Neurology during initial consult  Patient did have episodes of lethargy during mornings and would only wake to sternal rubs, and and rapidly fall back to sleep  · Patient has been evaluated throughout the day, and has been alert and tracking individuals while in room  Patient responds to tactile stimulus on feet  During my examinations today patient has been close to hospital baseline that was established with caretaker on Wednesday, 07/29/2020  Plan:   -per neurology we will obtain EEG to identify any seizure activity    -continue treatment with IV antibiotics  Will discontinue Monday after a total of 7 days

## 2020-08-01 PROBLEM — R13.10 DYSPHAGIA: Status: ACTIVE | Noted: 2020-08-01

## 2020-08-01 LAB
ACHR BIND AB SER-SCNC: 0.04 NMOL/L (ref 0–0.24)
ANION GAP SERPL CALCULATED.3IONS-SCNC: 6 MMOL/L (ref 4–13)
BASOPHILS # BLD AUTO: 0.03 THOUSANDS/ΜL (ref 0–0.1)
BASOPHILS NFR BLD AUTO: 0 % (ref 0–1)
BUN SERPL-MCNC: 12 MG/DL (ref 5–25)
CALCIUM SERPL-MCNC: 8.2 MG/DL (ref 8.3–10.1)
CHLORIDE SERPL-SCNC: 111 MMOL/L (ref 100–108)
CO2 SERPL-SCNC: 32 MMOL/L (ref 21–32)
CREAT SERPL-MCNC: 1.08 MG/DL (ref 0.6–1.3)
EOSINOPHIL # BLD AUTO: 0.14 THOUSAND/ΜL (ref 0–0.61)
EOSINOPHIL NFR BLD AUTO: 2 % (ref 0–6)
ERYTHROCYTE [DISTWIDTH] IN BLOOD BY AUTOMATED COUNT: 14.8 % (ref 11.6–15.1)
EST. AVERAGE GLUCOSE BLD GHB EST-MCNC: 120 MG/DL
GFR SERPL CREATININE-BSD FRML MDRD: 57 ML/MIN/1.73SQ M
GLUCOSE SERPL-MCNC: 234 MG/DL (ref 65–140)
GLUCOSE SERPL-MCNC: 244 MG/DL (ref 65–140)
GLUCOSE SERPL-MCNC: 253 MG/DL (ref 65–140)
GLUCOSE SERPL-MCNC: 267 MG/DL (ref 65–140)
HBA1C MFR BLD: 5.8 %
HCT VFR BLD AUTO: 40.7 % (ref 34.8–46.1)
HGB BLD-MCNC: 14.1 G/DL (ref 11.5–15.4)
IMM GRANULOCYTES # BLD AUTO: 0.02 THOUSAND/UL (ref 0–0.2)
IMM GRANULOCYTES NFR BLD AUTO: 0 % (ref 0–2)
LYMPHOCYTES # BLD AUTO: 2.21 THOUSANDS/ΜL (ref 0.6–4.47)
LYMPHOCYTES NFR BLD AUTO: 29 % (ref 14–44)
MCH RBC QN AUTO: 35.5 PG (ref 26.8–34.3)
MCHC RBC AUTO-ENTMCNC: 34.6 G/DL (ref 31.4–37.4)
MCV RBC AUTO: 103 FL (ref 82–98)
MONOCYTES # BLD AUTO: 0.75 THOUSAND/ΜL (ref 0.17–1.22)
MONOCYTES NFR BLD AUTO: 10 % (ref 4–12)
NEUTROPHILS # BLD AUTO: 4.39 THOUSANDS/ΜL (ref 1.85–7.62)
NEUTS SEG NFR BLD AUTO: 59 % (ref 43–75)
NRBC BLD AUTO-RTO: 0 /100 WBCS
PLATELET # BLD AUTO: 179 THOUSANDS/UL (ref 149–390)
PMV BLD AUTO: 11.1 FL (ref 8.9–12.7)
POTASSIUM SERPL-SCNC: 3.9 MMOL/L (ref 3.5–5.3)
RBC # BLD AUTO: 3.97 MILLION/UL (ref 3.81–5.12)
SODIUM SERPL-SCNC: 149 MMOL/L (ref 136–145)
VALPROATE SERPL-MCNC: 96 UG/ML (ref 50–100)
WBC # BLD AUTO: 7.54 THOUSAND/UL (ref 4.31–10.16)

## 2020-08-01 PROCEDURE — 3044F HG A1C LEVEL LT 7.0%: CPT | Performed by: FAMILY MEDICINE

## 2020-08-01 PROCEDURE — 85025 COMPLETE CBC W/AUTO DIFF WBC: CPT | Performed by: INTERNAL MEDICINE

## 2020-08-01 PROCEDURE — 82948 REAGENT STRIP/BLOOD GLUCOSE: CPT

## 2020-08-01 PROCEDURE — 92526 ORAL FUNCTION THERAPY: CPT

## 2020-08-01 PROCEDURE — 80048 BASIC METABOLIC PNL TOTAL CA: CPT | Performed by: INTERNAL MEDICINE

## 2020-08-01 PROCEDURE — 99233 SBSQ HOSP IP/OBS HIGH 50: CPT | Performed by: PSYCHIATRY & NEUROLOGY

## 2020-08-01 PROCEDURE — 99232 SBSQ HOSP IP/OBS MODERATE 35: CPT | Performed by: INTERNAL MEDICINE

## 2020-08-01 PROCEDURE — 80164 ASSAY DIPROPYLACETIC ACD TOT: CPT | Performed by: PHYSICIAN ASSISTANT

## 2020-08-01 PROCEDURE — 83036 HEMOGLOBIN GLYCOSYLATED A1C: CPT | Performed by: INTERNAL MEDICINE

## 2020-08-01 RX ORDER — INSULIN GLARGINE 100 [IU]/ML
10 INJECTION, SOLUTION SUBCUTANEOUS
Status: DISCONTINUED | OUTPATIENT
Start: 2020-08-01 | End: 2020-08-05 | Stop reason: HOSPADM

## 2020-08-01 RX ORDER — SODIUM CHLORIDE 450 MG/100ML
50 INJECTION, SOLUTION INTRAVENOUS ONCE
Status: COMPLETED | OUTPATIENT
Start: 2020-08-01 | End: 2020-08-01

## 2020-08-01 RX ADMIN — VALPROATE SODIUM 500 MG: 100 INJECTION, SOLUTION INTRAVENOUS at 08:50

## 2020-08-01 RX ADMIN — INSULIN LISPRO 2 UNITS: 100 INJECTION, SOLUTION INTRAVENOUS; SUBCUTANEOUS at 12:33

## 2020-08-01 RX ADMIN — OXYBUTYNIN 5 MG: 5 TABLET, FILM COATED, EXTENDED RELEASE ORAL at 17:40

## 2020-08-01 RX ADMIN — CALCIUM CARBONATE (ANTACID) CHEW TAB 500 MG 500 MG: 500 CHEW TAB at 17:40

## 2020-08-01 RX ADMIN — VALPROATE SODIUM 500 MG: 100 INJECTION, SOLUTION INTRAVENOUS at 17:45

## 2020-08-01 RX ADMIN — DOCUSATE SODIUM 100 MG: 100 CAPSULE, LIQUID FILLED ORAL at 17:41

## 2020-08-01 RX ADMIN — INSULIN GLARGINE 10 UNITS: 100 INJECTION, SOLUTION SUBCUTANEOUS at 22:08

## 2020-08-01 RX ADMIN — ARIPIPRAZOLE 30 MG: 10 TABLET ORAL at 22:09

## 2020-08-01 RX ADMIN — OXYBUTYNIN 5 MG: 5 TABLET, FILM COATED, EXTENDED RELEASE ORAL at 22:07

## 2020-08-01 RX ADMIN — PRAVASTATIN SODIUM 20 MG: 20 TABLET ORAL at 17:40

## 2020-08-01 RX ADMIN — TRAZODONE HYDROCHLORIDE 50 MG: 50 TABLET ORAL at 22:07

## 2020-08-01 RX ADMIN — ENOXAPARIN SODIUM 30 MG: 30 INJECTION SUBCUTANEOUS at 08:55

## 2020-08-01 RX ADMIN — INSULIN LISPRO 2 UNITS: 100 INJECTION, SOLUTION INTRAVENOUS; SUBCUTANEOUS at 08:47

## 2020-08-01 RX ADMIN — BACLOFEN 10 MG: 10 TABLET ORAL at 22:07

## 2020-08-01 RX ADMIN — INSULIN LISPRO 1 UNITS: 100 INJECTION, SOLUTION INTRAVENOUS; SUBCUTANEOUS at 22:18

## 2020-08-01 RX ADMIN — SODIUM CHLORIDE 50 ML/HR: 0.45 INJECTION, SOLUTION INTRAVENOUS at 13:56

## 2020-08-01 RX ADMIN — BACLOFEN 10 MG: 10 TABLET ORAL at 17:40

## 2020-08-01 RX ADMIN — CEFTRIAXONE SODIUM 1000 MG: 10 INJECTION, POWDER, FOR SOLUTION INTRAVENOUS at 12:32

## 2020-08-01 NOTE — ASSESSMENT & PLAN NOTE
· Due to unresponsiveness and lethargy a rapid response was called yesterday  · No intervention was needed as patient was hemodynamically stable and alert after initiation of rapid  · Neurology was consulted, for possible seizure activity in patient  · Felt due to UTI r/o seizure    Plan:   -Random EEG no epileptiform activity   -Rec to resume her Valproic acid at home dose    -continue treatment with IV antibiotics  Will discontinue Monday after a total of 7 days

## 2020-08-01 NOTE — ASSESSMENT & PLAN NOTE
· Pt was having dysphagia problem at the group home prior to the admission  · Speech following   May need VBS eval  · AchR pending

## 2020-08-01 NOTE — PROGRESS NOTES
Progress Note - Neeru Yepez 1960, 64 y o  female MRN: [id number]    Unit/Bed#: S -01 Encounter: [id number]    Primary Care Provider: Marc Prasad DO   Date and time admitted to hospital: 7/26/2020 11:21 AM        * Toxic metabolic encephalopathy  Assessment & Plan  · Due to unresponsiveness and lethargy a rapid response was called yesterday  · No intervention was needed as patient was hemodynamically stable and alert after initiation of rapid  · Neurology was consulted, for possible seizure activity in patient  · Felt due to UTI r/o seizure    Plan:   -Random EEG no epileptiform activity   -Rec to resume her Valproic acid at home dose    -continue treatment with IV antibiotics  Will discontinue Monday after a total of 7 days  Cerebral palsy (HCC)  Assessment & Plan  · Mentates at baseline  · Has muscular rigidity secondary to cerebral palsy  · Frequent repositioning  · Baseline spastic quadriparesis and intellectual disability  Resides in a group home  · Caretaker mentioned gradual decline in the lase several months      Dysphagia  Assessment & Plan  · Pt was having dysphagia problem at the group home prior to the admission  · Speech following  May need VBS eval  · AchR pending           Type 2 diabetes mellitus, without long-term current use of insulin [facility])  Assessment & Plan  Lab Results   Component Value Date    HGBA1C 6 6 (H) 03/12/2020       Recent Labs     07/31/20  1706 07/31/20  2101 08/01/20  0825 08/01/20  1134   POCGLU 187* 359* 253* 234*       Blood Sugar Average: Last 72 hrs:  (P) 033 [id number]     · Patient's average blood sugars are last 24 hours have been greater than 300  · Increase Lantus to 10  units of Lantus, and add 5 units humalog before each meal   · Will repeat HgA1C      Hypernatremia  Assessment & Plan  Patient has Na level of 149 today  The group facility she is from states that patient tends to become hypernatremic  Hypovolemic due to NPO   Will start 1/2 NS infusion  Monitor Na Am           VTE Pharmacologic Prophylaxis:   Pharmacologic: Enoxaparin (Lovenox)  Mechanical: Mechanical VTE prophylaxis in place  Patient Centered Rounds:   Discussions with Specialists or Other Care Team Provider:   Education and Discussions with Family / Patient:   Time Spent for Care: 20 minutes  More than 50% of total time spent on counseling and coordination of care as described above  Current Length of Stay: 6 day(s)  Current Patient Status: Inpatient   Certification Statement: The patient will continue to require additional inpatient hospital stay due to above    Discharge Plan: Back to group home soon? Code Status: Level 1 - Full Code    Subjective:   Pt is more awake this morning  Her care taker at her bedside  States that Pt is close to but not quite yet at her baseline  Objective:   Vitals:   Temp (24hrs), Av 7 °F (36 5 °C), Min:97 5 °F (36 4 °C), Max:97 9 °F (36 6 °C)    Temp:  [97 5 °F (36 4 °C)-97 9 °F (36 6 °C)] 97 5 °F (36 4 °C)  HR:  [] 105  Resp:  [16-18] 16  BP: (124-145)/(80-92) 137/88  SpO2:  [95 %-96 %] 95 %  Body mass index is 17 6 kg/m²  Input and Output Summary (last 24 hours):        Intake/Output Summary (Last 24 hours) at 2020 1339  Last data filed at 2020 1059  Gross per 24 hour   Intake 420 ml   Output 716 ml   Net -296 ml       Physical Exam:     Physical Exam    Additional Data:   Labs:  Results from last 7 days   Lab Units 20  1006   WBC Thousand/uL 7 54   HEMOGLOBIN g/dL 14 1   HEMATOCRIT % 40 7   PLATELETS Thousands/uL 179   NEUTROS PCT % 59   LYMPHS PCT % 29   MONOS PCT % 10   EOS PCT % 2     Results from last 7 days   Lab Units 20  1006  20  1153   POTASSIUM mmol/L 3 9   < > 4 3   CHLORIDE mmol/L 111*   < > 104   CO2 mmol/L 32   < > 33*   BUN mg/dL 12   < > 14   CREATININE mg/dL 1 08   < > 1 19   CALCIUM mg/dL 8 2*   < > 10 1   ALK PHOS U/L  --   --  76   ALT U/L  --   --  39   AST U/L  --   -- 24    < > = values in this interval not displayed  Results from last 7 days   Lab Units 07/26/20  1153   INR  0 97         Cultures:   Blood Culture:   Lab Results   Component Value Date    BLOODCX No Growth After 5 Days  07/26/2020    BLOODCX No Growth After 5 Days  07/26/2020    BLOODCX No Growth After 5 Days  04/08/2019    BLOODCX No Growth After 5 Days  04/08/2019    BLOODCX No Growth After 5 Days  12/17/2018    BLOODCX No Growth After 5 Days  12/17/2018    BLOODCX No Growth After 5 Days   03/27/2017     Urine Culture:   Lab Results   Component Value Date    URINECX >100,000 cfu/ml Enterobacter aerogenes (A) 07/26/2020    URINECX >100,000 cfu/ml Enterobacter aerogenes (A) 07/22/2020    URINECX >100,000 cfu/ml Enterobacter aerogenes (A) 07/22/2020    URINECX >100,000 cfu/ml Klebsiella variicola (A) 12/17/2018    URINECX <10,000 cfu/ml  12/17/2018    URINECX No Growth <1000 cfu/mL 03/28/2017    URINECX >100,000 cfu/ml Escherichia coli 03/10/2017     Sputum Culture: No components found for: SPUTUMCX  Wound Culture: No results found for: WOUNDCULT    Last 24 Hours Medication List:     Current Facility-Administered Medications:  acetaminophen 650 mg Oral Q6H PRN Valentino Rein, MD    ARIPiprazole 30 mg Oral HS Valentino Rein, MD    artificial tear  Both Eyes HS PRN Valentino Rein, MD    baclofen 10 mg Oral TID Valentino Rein, MD    calcium carbonate 500 mg Oral BID With Meals Valentino Rein, MD    cefTRIAXone 1,000 mg Intravenous Q24H Brian Winters MD Last Rate: 1,000 mg (08/01/20 1232)   cholecalciferol 2,000 Units Oral Daily Valentino Rein, MD    citalopram 40 mg Oral QAM Valentino Rein, MD    docusate sodium 100 mg Oral BID Valentino Rein, MD    enoxaparin 30 mg Subcutaneous Daily Valentino Rein, MD    famotidine 20 mg Oral Daily Valentino Rein, MD    fluticasone 1 spray Nasal Daily PRN Valentino Rein, MD    guaiFENesin 600 mg Oral BID PRN Valentino Rein, MD    insulin glargine 10 Units Subcutaneous HS Sancho Emery MD    insulin lispro 1-5 Units Subcutaneous HS Cuca Palma MD    insulin lispro 5 Units Subcutaneous TID With Meals Sancho Emery MD    levothyroxine 25 mcg Oral Early Morning Cuca Palma MD    ondansetron 4 mg Intravenous Q6H PRN Cuca Palma MD    oxybutynin 5 mg Oral TID Cuca Palma MD    polyethylene glycol 17 g Oral QAM Cuca Palma MD    pravastatin 20 mg Oral Daily With Maine Villegas MD    sodium chloride (PF) 3 mL Intravenous Q1H PRN Shira Cameron MD    sodium chloride 50 mL/hr Intravenous Once Sancho Emery MD    traZODone 50 mg Oral HS Carlyn Koroma MD    valproate sodium 500 mg Intravenous BID Sofie Dial PA-C Last Rate: 500 mg (08/01/20 0850)        Today, Patient Was Seen By: Sancho Emery MD    ** Please Note: Dragon 360 Dictation voice to text software may have been used in the creation of this document   **

## 2020-08-01 NOTE — ASSESSMENT & PLAN NOTE
· Mentates at baseline  · Has muscular rigidity secondary to cerebral palsy  · Frequent repositioning  · Baseline spastic quadriparesis and intellectual disability   Resides in a group home  · Caretaker mentioned gradual decline in the lase several months

## 2020-08-01 NOTE — PLAN OF CARE
Problem: Potential for Falls  Goal: Patient will remain free of falls  Description  INTERVENTIONS:  - Assess patient frequently for physical needs  -  Identify cognitive and physical deficits and behaviors that affect risk of falls  -  [place] fall precautions as indicated by assessment   - Educate patient/family on patient safety including physical limitations  - Instruct patient to call for assistance with activity based on assessment  - Modify environment to reduce risk of injury  - Consider OT/PT consult to assist with strengthening/mobility  Outcome: Progressing     Problem: SAFETY ADULT  Goal: Patient will remain free of falls  Description  INTERVENTIONS:  - Assess patient frequently for physical needs  -  Identify cognitive and physical deficits and behaviors that affect risk of falls    -  [place] fall precautions as indicated by assessment   - Educate patient/family on patient safety including physical limitations  - Instruct patient to call for assistance with activity based on assessment  - Modify environment to reduce risk of injury  - Consider OT/PT consult to assist with strengthening/mobility  Outcome: Progressing

## 2020-08-01 NOTE — SPEECH THERAPY NOTE
Speech Language/Pathology    Speech/Language Pathology Progress Note    Patient Name: Rigo DIOP Date: 8/1/2020     Problem List  Principal Problem:    Toxic metabolic encephalopathy  Active Problems:    Cerebral palsy ([facility] )    Type 2 diabetes mellitus, without long-term current use of insulin (HCC)    Hypothyroidism    Hypernatremia    Acute cystitis without hematuria    Decreased appetite    Hypokalemia    Dysphagia       Past Medical History  Past Medical History:   Diagnosis Date    Adjustment disorder     Altered mental status 12/11/2015    Anemia     Bipolar 1 disorder ([facility] )     Cerebral palsy ([facility] )     Chronic hypernatremia 2/6/2016    Closed fracture of left hip ([facility] ) 1/19/2016    Closed left hip fracture (HCC)     no surgery    Constipation     Dehydration 2/20/2016    Diabetes mellitus ([facility] )     Disease of thyroid gland     Diverticulosis     Fracture of multiple ribs of right side     Hip fracture ([facility] ) 07/26/2015    left    Hyperlipidemia     Hypernatremia 12/28/2018    Hypotension     Impulse control disorder     Incontinence     Intellectual disability due to developmental disorder, unspecified     Microalbuminuria     Osteopathia     Osteoporosis     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HCC)     Thrombocytopenia ([facility] ) 7/31/2015        Past Surgical History  Past Surgical History:   Procedure Laterality Date    ABDOMINAL SURGERY      COLECTOMY MIN      re-anastomosis 7/22/16    COLONOSCOPY N/A 7/21/2016    Procedure: COLONOSCOPY;  Surgeon: Carlos Anderson MD;  [facility]; Service:     COLONOSCOPY N/A 1/31/2016    Procedure: COLONOSCOPY;  Surgeon: Carlos Anderson MD;  [facility];  Service:     COLONOSCOPY N/A 7/25/2017    Procedure: COLONOSCOPY;  Surgeon: Carlos Anderson MD;  [facility];   Service: Colorectal    COLOSTOMY      EXPLORATORY LAPAROTOMY W/ BOWEL RESECTION N/A 1/31/2016    Procedure: exploratory laparotomy, left sigmoidectomy, coloproctostomy, take down splenic flexure, loop colostomy;  Surgeon: Catheline Favre, MD;  [facility];  Service:     MI CLOSE ENTEROSTOMY N/A 7/22/2016    Procedure: SEGMENTAL COLECTOMY WITH COLOCOLOSTOMY;  Surgeon: Catheline Favre, MD;  [facility];  Service: Colorectal         Subjective:  Pt sleeping but easilya roused when name called  Objective:  Pt seen for ongoing PO tolerance  Rn reported improved alertness today and relayed discussion with staff member from group home regarding Terrie's baseline for eating/drinking  Pt sat fully upright in bed and presented c tsps of applesauce c immediate retrieval and timely transfer  P tolerated trials of HTL/NTL via tsp with no overt s/s penetration/aspiration  When trialing thin liquid via straw sips, pt initially tolerated but with progression, pt noted to have decreased coordination resulting in cough response  Trialed pt c bennett cracker but pt did not attempt to manipulate/masticate with whole pieces on the tongue base and then transferred whole with multiple swallows       Assessment:  Pt c improved swallow functional from previous day and safe for puree and NTL, cont to present with some degree of risk for thin liquids and solid foods    Plan/Recommendations:  Initiate diet of puree with NTL  Cont trial upgrades c SLP as tolerated

## 2020-08-01 NOTE — ASSESSMENT & PLAN NOTE
Lab Results   Component Value Date    HGBA1C 6 6 (H) 03/12/2020       Recent Labs     07/31/20  1706 07/31/20  2101 08/01/20  0825 08/01/20  1134   POCGLU 187* 359* 253* 234*       Blood Sugar Average: Last 72 hrs:  (P) 891 [id number]     · Patient's average blood sugars are last 24 hours have been greater than 300  · Increase Lantus to 10  units of Lantus, and add 5 units humalog before each meal   · Will repeat HgA1C

## 2020-08-01 NOTE — PROGRESS NOTES
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, bipolar 1 disorder, and vascular risk factors, who originally presented to the ED with generalized weakness and toxic metabolic encephalopathy in the setting of UTI  A rapid response was activated on 07/31 due to an episode of unresponsiveness  Suspicious for seizure given her risk factors and active infection as provoking factor  The patient was also on Depakote for bipolar disorder, but had not received the medication since 07/27 due to lethargy, which may have potentially been masking her potential for seizures in the past     Suspect the ongoing dysphagia is likely related to toxic metabolic encephalopathy superimposed on poor cognitive baseline  No stroke noted on imaging and do not suspect myasthenia gravis  Plan:  - Pending valproic acid, total level  · Spoke with nursing, attempted multiple times since yesterday but patient is a difficult stick  Able to obtain blood work this afternoon  · Patient is s/p 600 mg load of Depacon with 500 mg IV dose this morning  · No ongoing concern for seizure activity at this time  Will continue on IV Depacon 500 mg BID (patient remains NPO)  - Seizure precautions   - Can continue to defer video EEG monitoring   - Acetylcholine receptor antibody binding/blocking/modulating pending  - Medical management and supportive care per primary team  Correction of any metabolic or infectious disturbances  Subjective: The patient's long-term caregiver was present at bedside this morning  She does not believe that the patient had a seizure, as she has not experienced seizures before  She states that the patient has good and bad days  The episode and reason for the concern for seizure were discussed in detail with the patient's caregiver  She prefers not to have any medications suggested at this time, and she was reassured that we will not be adding on any seizure medications, as the patient was able to restart her Depakote that she was taking for mood stability  The patient's caregiver reports that she has been giving her sips of honey thick liquid and she has not had any issues with that  She reports she is more awake today but not quite yet at her baseline  Past Medical History:   Diagnosis Date    Adjustment disorder     Altered mental status 12/11/2015    Anemia     Bipolar 1 disorder (HCC)     [facility]bral palsy ([facility] )     Chronic hypernatremia 2/6/2016    Closed fracture of left hip ([facility] ) 1/19/2016    Closed left hip fracture (HCC)     no surgery    Constipation     Dehydration 2/20/2016    Diabetes mellitus ([place] [address] )     Disease of thyroid gland     Diverticulosis     Fracture of multiple ribs of right side     Hip fracture ([facility] ) 07/26/2015    left    Hyperlipidemia     Hypernatremia 12/28/2018    Hypotension     Impulse control disorder     Incontinence     Intellectual disability due to developmental disorder, unspecified     Microalbuminuria     Osteopathia     Osteoporosis     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[place] [address] )     Thrombocytopenia ([place] [address] ) 7/31/2015     Past Surgical History:   Procedure Laterality Date    ABDOMINAL SURGERY      COLECTOMY MIN      re-anastomosis 7/22/16    COLONOSCOPY N/A 7/21/2016    Procedure: COLONOSCOPY;  Surgeon: Dora Cortez MD;  [facility]; Service:     COLONOSCOPY N/A 1/31/2016    Procedure: COLONOSCOPY;  Surgeon: Dora Cortez MD;  [facility];  Service:     COLONOSCOPY N/A 7/25/2017    Procedure: COLONOSCOPY;  Surgeon: Dora Cortez MD;  [facility];   Service: Colorectal    COLOSTOMY      EXPLORATORY LAPAROTOMY W/ BOWEL RESECTION N/A 1/31/2016    Procedure: exploratory laparotomy, left sigmoidectomy, coloproctostomy, take down splenic flexure, loop colostomy;  Surgeon: Maritza Pham MD;  [facility];  Service:     [place] CLOSE ENTEROSTOMY N/A 7/22/2016    Procedure: SEGMENTAL COLECTOMY WITH COLOCOLOSTOMY;  Surgeon: Maritza Pham MD;  [facility];  Service: Colorectal     Family History   Problem Relation Age of Onset    Alcohol abuse Mother     Alcohol abuse Father     Diabetes Sister     No Known Problems Sister     No Known Problems Sister      Social History     Socioeconomic History    Marital status: Single     Spouse name: None    Number of children: None    Years of education: None    Highest education level: None   Occupational History    None   Social Needs    Financial resource strain: None    Food insecurity:     Worry: None     Inability: None    Transportation needs:     Medical: None     Non-medical: None   Tobacco Use    Smoking status: Never Smoker    Smokeless tobacco: Never Used   Substance and Sexual Activity    Alcohol use: No     Frequency: Never     Binge frequency: Never    Drug use: No    Sexual activity: Never   Lifestyle    Physical activity:     Days per week: None     Minutes per session: None    Stress: None   Relationships    Social connections:     Talks on phone: None     Gets together: None     Attends Church service: None     Active member of club or organization: None     Attends meetings of clubs or organizations: None     Relationship status: None    Intimate partner violence:     Fear of current or ex partner: None     Emotionally abused: None     Physically abused: None     Forced sexual activity: None   Other Topics Concern    None   Social History Narrative    Lives in a group home          Medications:   All current active meds have been reviewed and current meds:  Scheduled Meds:  Current Facility-Administered Medications:  acetaminophen 650 mg Oral Q6H PRN Helen Lopes MD    ARIPiprazole 30 mg Oral HS Helen Lopes MD    artificial tear  Both Eyes HS PRN Jackson Galindo MD    baclofen 10 mg Oral TID Jackson Galindo MD    calcium carbonate 500 mg Oral BID With Meals Jackson Galindo MD    cefTRIAXone 1,000 mg Intravenous Q24H Kin Trotter MD Last Rate: 1,000 mg (07/31/20 1217)   cholecalciferol 2,000 Units Oral Daily Jackson Galindo MD    citalopram 40 mg Oral QAM Jackson Galindo MD    docusate sodium 100 mg Oral BID Jackson Galindo MD    enoxaparin 30 mg Subcutaneous Daily Jackson Galindo MD    famotidine 20 mg Oral Daily Jackson Galindo MD    fluticasone 1 spray Nasal Daily PRN Jackson Galindo MD    guaiFENesin 600 mg Oral BID PRN Jackson Galindo MD    insulin glargine 5 Units Subcutaneous HS Kin Trotter MD    insulin lispro 1-5 Units Subcutaneous TID AC Jackson Galindo MD    insulin lispro 1-5 Units Subcutaneous HS Jackson Galindo MD    levothyroxine 25 mcg Oral Early Morning Jackson Galindo MD    ondansetron 4 mg Intravenous Q6H PRN Jackson Galindo MD    oxybutynin 5 mg Oral TID Jackson Galindo MD    polyethylene glycol 17 g Oral QAM Jackson Galindo MD    pravastatin 20 mg Oral Daily With Jillian Flores MD    sodium chloride (PF) 3 mL Intravenous Q1H PRN Melba Pena MD    traZODone 50 mg Oral HS Kin Trotter MD    valproate sodium 500 mg Intravenous BID Sofie Dial PA-C Last Rate: 500 mg (08/01/20 0850)     Continuous Infusions:   PRN Meds:   acetaminophen    artificial tear    fluticasone    guaiFENesin    ondansetron    sodium chloride (PF)       ROS:   Review of Systems   Unable to perform ROS: Patient nonverbal             Vitals:   /88 (BP Location: Left arm)   Pulse 100   Temp 97 5 °F (36 4 °C) (Oral)   Resp 16   Ht 4' 10" (1 473 m)   Wt 38 2 kg (84 lb 3 5 oz)   LMP 11/29/2009 (Exact Date)   SpO2 95%   BMI 17 60 kg/m²     Physical Exam:   Physical Exam   Constitutional: She appears well-developed and well-nourished  No distress     Sitting up in chair, caregiver at bedside   HENT: Head: Normocephalic and atraumatic  Right Ear: External ear normal    Left Ear: External ear normal    Nose: Nose normal    Mouth/Throat: Oropharynx is clear and moist    Eyes: Conjunctivae are normal  Right eye exhibits no discharge  Left eye exhibits no discharge  No scleral icterus  Tracks examiner   Neck: Normal range of motion  Neck supple  Cardiovascular: Normal rate  Pulmonary/Chest: Effort normal  No respiratory distress  Neurological: She is alert  Able to say hi, but otherwise remains nonverbal   Spastic   Skin: Skin is warm and dry  No rash noted  She is not diaphoretic  No erythema  Psychiatric: She is withdrawn  She is not combative  She does not express impulsivity  She is noncommunicative  She is inattentive  Nursing note and vitals reviewed  Neurologic Exam     Mental Status   Level of consciousness: alert  Mostly non verbal at baseline  Patient is able to say hello, but otherwise does not answer orientation questions  Does not follow commands  Tracks examiner  Cranial Nerves     CN II   Visual acuity: (Tracks examiner around the room)    CN III, IV, VI   Right pupil: Shape: regular  Left pupil: Shape: regular  Conjugate gaze: present    CN VII   Facial expression full, symmetric  Cranial nerve testing limited given patient's baseline and difficulty following commands  Motor Exam   Right arm tone: spastic  Left arm tone: spastic  Right leg tone: spastic  Left leg tone: spastic    Sensory Exam   Responds to foot tickle by giggling     Gait, Coordination, and Reflexes     Gait  Gait: (Deferred for safety)    Tremor   Resting tremor: absent  Intention tremor: absent  Action tremor: absent          Labs: I have personally reviewed pertinent reports     Recent Results (from the past 24 hour(s))   Fingerstick Glucose (POCT)    Collection Time: 07/31/20 11:13 AM   Result Value Ref Range    POC Glucose 323 (H) 65 - 140 mg/dl   Novel Coronavirus (Covid-19),PCR [facility] Collection Time: 07/31/20 11:34 AM   Result Value Ref Range    SARS-CoV-2 Negative Negative   Fingerstick Glucose (POCT)    Collection Time: 07/31/20 12:15 PM   Result Value Ref Range    POC Glucose 350 (H) 65 - 140 mg/dl   Fingerstick Glucose (POCT)    Collection Time: 07/31/20  5:06 PM   Result Value Ref Range    POC Glucose 187 (H) 65 - 140 mg/dl   Fingerstick Glucose (POCT)    Collection Time: 07/31/20  9:01 PM   Result Value Ref Range    POC Glucose 359 (H) 65 - 140 mg/dl   Fingerstick Glucose (POCT)    Collection Time: 08/01/20  8:25 AM   Result Value Ref Range    POC Glucose 253 (H) 65 - 140 mg/dl       Imaging: I have personally reviewed pertinent imaging in PACS, including MRI,  and I have personally reviewed PACS reports  EKG, Pathology, and Other Studies: I have personally reviewed pertinent reports  VTE Prophylaxis: Enoxaparin (Lovenox)      Counseling / Coordination of Care  Total time spent today 40 minutes  Greater than 50% of total time was spent with the patient and/or family counseling and/or coordination of care  A description of the counseling/coordination of care:  Patient was seen and evaluated  Discussed medications and labs with nursing and primary team  Discussed episode, possible etiology, medications at length with caregiver at bedside  Chart reviewed thoroughly including laboratory and imaging studies    Plan of care discussed with attending neurologist

## 2020-08-02 LAB
ANION GAP SERPL CALCULATED.3IONS-SCNC: 5 MMOL/L (ref 4–13)
BUN SERPL-MCNC: 10 MG/DL (ref 5–25)
CALCIUM SERPL-MCNC: 7.8 MG/DL (ref 8.3–10.1)
CHLORIDE SERPL-SCNC: 110 MMOL/L (ref 100–108)
CO2 SERPL-SCNC: 31 MMOL/L (ref 21–32)
CREAT SERPL-MCNC: 0.87 MG/DL (ref 0.6–1.3)
GFR SERPL CREATININE-BSD FRML MDRD: 74 ML/MIN/1.73SQ M
GLUCOSE SERPL-MCNC: 110 MG/DL (ref 65–140)
GLUCOSE SERPL-MCNC: 133 MG/DL (ref 65–140)
GLUCOSE SERPL-MCNC: 195 MG/DL (ref 65–140)
GLUCOSE SERPL-MCNC: 210 MG/DL (ref 65–140)
GLUCOSE SERPL-MCNC: 211 MG/DL (ref 65–140)
GLUCOSE SERPL-MCNC: 223 MG/DL (ref 65–140)
GLUCOSE SERPL-MCNC: 85 MG/DL (ref 65–140)
POTASSIUM SERPL-SCNC: 4.2 MMOL/L (ref 3.5–5.3)
SODIUM SERPL-SCNC: 146 MMOL/L (ref 136–145)

## 2020-08-02 PROCEDURE — 82948 REAGENT STRIP/BLOOD GLUCOSE: CPT

## 2020-08-02 PROCEDURE — 80048 BASIC METABOLIC PNL TOTAL CA: CPT | Performed by: INTERNAL MEDICINE

## 2020-08-02 PROCEDURE — 99232 SBSQ HOSP IP/OBS MODERATE 35: CPT | Performed by: INTERNAL MEDICINE

## 2020-08-02 RX ADMIN — BACLOFEN 10 MG: 10 TABLET ORAL at 17:07

## 2020-08-02 RX ADMIN — DOCUSATE SODIUM 100 MG: 100 CAPSULE, LIQUID FILLED ORAL at 08:29

## 2020-08-02 RX ADMIN — OXYBUTYNIN 5 MG: 5 TABLET, FILM COATED, EXTENDED RELEASE ORAL at 17:08

## 2020-08-02 RX ADMIN — FAMOTIDINE 20 MG: 20 TABLET, FILM COATED ORAL at 08:28

## 2020-08-02 RX ADMIN — CEFTRIAXONE SODIUM 1000 MG: 10 INJECTION, POWDER, FOR SOLUTION INTRAVENOUS at 12:07

## 2020-08-02 RX ADMIN — BACLOFEN 10 MG: 10 TABLET ORAL at 08:28

## 2020-08-02 RX ADMIN — VALPROATE SODIUM 500 MG: 100 INJECTION, SOLUTION INTRAVENOUS at 17:08

## 2020-08-02 RX ADMIN — BACLOFEN 10 MG: 10 TABLET ORAL at 20:58

## 2020-08-02 RX ADMIN — ENOXAPARIN SODIUM 30 MG: 30 INJECTION SUBCUTANEOUS at 08:29

## 2020-08-02 RX ADMIN — CHOLECALCIFEROL TAB 25 MCG (1000 UNIT) 2000 UNITS: 25 TAB at 08:29

## 2020-08-02 RX ADMIN — OXYBUTYNIN 5 MG: 5 TABLET, FILM COATED, EXTENDED RELEASE ORAL at 20:57

## 2020-08-02 RX ADMIN — OXYBUTYNIN 5 MG: 5 TABLET, FILM COATED, EXTENDED RELEASE ORAL at 06:23

## 2020-08-02 RX ADMIN — CALCIUM CARBONATE (ANTACID) CHEW TAB 500 MG 500 MG: 500 CHEW TAB at 08:29

## 2020-08-02 RX ADMIN — CITALOPRAM HYDROBROMIDE 40 MG: 20 TABLET ORAL at 08:28

## 2020-08-02 RX ADMIN — LEVOTHYROXINE SODIUM 25 MCG: 25 TABLET ORAL at 06:23

## 2020-08-02 RX ADMIN — DOCUSATE SODIUM 100 MG: 100 CAPSULE, LIQUID FILLED ORAL at 17:08

## 2020-08-02 RX ADMIN — TRAZODONE HYDROCHLORIDE 50 MG: 50 TABLET ORAL at 21:00

## 2020-08-02 RX ADMIN — PRAVASTATIN SODIUM 20 MG: 20 TABLET ORAL at 17:08

## 2020-08-02 RX ADMIN — INSULIN GLARGINE 10 UNITS: 100 INJECTION, SOLUTION SUBCUTANEOUS at 20:57

## 2020-08-02 RX ADMIN — POLYETHYLENE GLYCOL 3350 17 G: 17 POWDER, FOR SOLUTION ORAL at 08:30

## 2020-08-02 RX ADMIN — CALCIUM CARBONATE (ANTACID) CHEW TAB 500 MG 500 MG: 500 CHEW TAB at 17:08

## 2020-08-02 RX ADMIN — VALPROATE SODIUM 500 MG: 100 INJECTION, SOLUTION INTRAVENOUS at 08:33

## 2020-08-02 RX ADMIN — ARIPIPRAZOLE 30 MG: 10 TABLET ORAL at 21:00

## 2020-08-02 NOTE — PROGRESS NOTES
Progress Note - Aaliyah Vaughn 1960, 64 y o  female MRN: [id number]    Unit/Bed#: S -01 Encounter: [id number]    Primary Care Provider: Mine Suarez DO   Date and time admitted to hospital: 7/26/2020 11:21 AM        * Toxic metabolic encephalopathy  Assessment & Plan  · Due to unresponsiveness and lethargy a rapid response was called yesterday  · No intervention was needed as patient was hemodynamically stable and alert after initiation of rapid  · Neurology was consulted, for possible seizure activity in patient  · Felt due to UTI r/o seizure  · Patient seems to be mentating close to baseline today  Plan:   -Random EEG no epileptiform activity   -Rec to resume her Valproic acid at home dose    -continue treatment with IV antibiotics  Will discontinue Monday after a total of 7 days  Dysphagia  Assessment & Plan  · Pt was having dysphagia problem at the group home prior to the admission  · Speech following  · Will try to obtain VBS eval tomorrow  · AchR pending           Acute cystitis without hematuria  Assessment & Plan  · Patient is afebrile today and does not have leukocytosis  · Final urine culture shows Enterobacter that is resistant to amoxicillin, ampicillin and cefazolin    · Will continue patient on IV Ancef  · Monitor Vital signs, CBC,           Cerebral palsy (HCC)  Assessment & Plan  · Mentates at baseline  · Has muscular rigidity secondary to cerebral palsy  · Frequent repositioning  · Baseline spastic quadriparesis and intellectual disability   Resides in a group home  · Caretaker mentioned gradual decline in the lase several months      Type 2 diabetes mellitus, without long-term current use of insulin [facility])  Assessment & Plan  Lab Results   Component Value Date    HGBA1C 5 8 (H) 08/01/2020       Recent Labs     08/01/20  1134 08/01/20  1615 08/01/20  2217 08/02/20  0816   POCGLU 234* 267* 211* 210*       Blood Sugar Average: Last 72 hrs:  (P) 618 [id number] · Patient's average blood sugars are last 24 hours have ranged between 210-267  ·  Lantus to 10  units of Lantus, and add 5 units humalog before each meal   · Patient's A1c is 5 8  · Will resume metformin on discharge  Hypothyroidism  Assessment & Plan  · Most recent TSH in  6  · Is on Synthroid 25mcg      Hypernatremia  Assessment & Plan  Patient has Na level of 146 today  Is off of fluids today         VTE Pharmacologic Prophylaxis:   Pharmacologic: Enoxaparin (Lovenox)  Mechanical VTE Prophylaxis in Place: Yes    Discussions with Specialists or Other Care Team Provider:  Neurology, GI, speech and swallow, PT/OT    Education and Discussions with Family / Patient:  Simónjamir Escudero, is very involved and is updated on a regular basis  Current Length of Stay: 7 day(s)    Current Patient Status: Inpatient     Discharge Plan / Estimated Discharge Date:  24-48 hours pending patient's mentation status, VBS , dysphasia resolution  Code Status: Level 1 - Full Code      Subjective:   Patient responds when I walk in the room and say hi  Is tracking with her eyes, is responding yes and no  Is a holding her fluid totally in her hand  She had no significant overnight events  Objective:     Vitals:   Temp (24hrs), Av °F (36 7 °C), Min:97 8 °F (36 6 °C), Max:98 2 °F (36 8 °C)    Temp:  [97 8 °F (36 6 °C)-98 2 °F (36 8 °C)] 97 8 °F (36 6 °C)  HR:  [] 70  Resp:  [16-18] 18  BP: (126-137)/(75-88) 126/76  SpO2:  [94 %-98 %] 94 %  Body mass index is 18 57 kg/m²  Input and Output Summary (last 24 hours): Intake/Output Summary (Last 24 hours) at 2020 0914  Last data filed at 2020 1755  Gross per 24 hour   Intake 998 ml   Output 580 ml   Net 418 ml       Physical Exam:     Physical Exam   Constitutional: She appears well-developed and well-nourished  No distress     HENT:   Mouth/Throat: Oropharynx is clear and moist    Eyes: Conjunctivae are normal  Right eye exhibits no discharge  Left eye exhibits no discharge  Cardiovascular: Regular rhythm, S1 normal, S2 normal and normal heart sounds  Tachycardia present  Pulses are palpable  Pulmonary/Chest: Effort normal and breath sounds normal  No respiratory distress  She has no wheezes  Abdominal: Soft  She exhibits no distension  There is no abdominal tenderness  There is no guarding  Neurological: She is alert  Skin: Skin is warm and dry  She is not diaphoretic  No erythema  No pallor  Vitals reviewed  Additional Data:     Labs:    Results from last 7 days   Lab Units 08/01/20  1006   WBC Thousand/uL 7 54   HEMOGLOBIN g/dL 14 1   HEMATOCRIT % 40 7   PLATELETS Thousands/uL 179   NEUTROS PCT % 59   LYMPHS PCT % 29   MONOS PCT % 10   EOS PCT % 2     Results from last 7 days   Lab Units 08/02/20  0723  07/26/20  1153   POTASSIUM mmol/L 4 2   < > 4 3   CHLORIDE mmol/L 110*   < > 104   CO2 mmol/L 31   < > 33*   BUN mg/dL 10   < > 14   CREATININE mg/dL 0 87   < > 1 19   CALCIUM mg/dL 7 8*   < > 10 1   ALK PHOS U/L  --   --  76   ALT U/L  --   --  39   AST U/L  --   --  24    < > = values in this interval not displayed  Results from last 7 days   Lab Units 07/26/20  1153   INR  0 97       * I Have Reviewed All Lab Data Listed Above  * Additional Pertinent Lab Tests Reviewed: All Labs Within Last 24 Hours Reviewed    Imaging:    Imaging Reports Reviewed Today Include:   Imaging Personally Reviewed by Myself Includes:      Recent Cultures (last 7 days):     Results from last 7 days   Lab Units 07/26/20  1302 07/26/20  1237 07/26/20  1153   BLOOD CULTURE   --  No Growth After 5 Days  No Growth After 5 Days     URINE CULTURE  >100,000 cfu/ml Enterobacter aerogenes*  --   --        Last 24 Hours Medication List:   acetaminophen, 650 mg, Oral, Q6H PRN, Alysa Ramírez MD  ARIPiprazole, 30 mg, Oral, HS, Alysa Ramírez MD  artificial tear, , Both Eyes, HS PRN, Alysa Ramírez MD  baclofen, 10 mg, Oral, TID, Alysa Ramírez MD  calcium carbonate, 500 mg, Oral, BID With Meals, Yamil Kilpatrick MD  cefTRIAXone, 1,000 mg, Intravenous, Q24H, Cristian Huynh MD, Last Rate: 1,000 mg (08/01/20 1232)  cholecalciferol, 2,000 Units, Oral, Daily, Yamil Kilpatrick MD  citalopram, 40 mg, Oral, QAM, Yamil Kilpatrick MD  docusate sodium, 100 mg, Oral, BID, Yamil Kilpatrick MD  enoxaparin, 30 mg, Subcutaneous, Daily, Yamil Kilpatrick MD  famotidine, 20 mg, Oral, Daily, Yamil Kilpatrick MD  fluticasone, 1 spray, Nasal, Daily PRN, Yamil Kilpatrick MD  guaiFENesin, 600 mg, Oral, BID PRN, Yamil Kilpatrick MD  insulin glargine, 10 Units, Subcutaneous, HS, Ingris Eason MD  insulin lispro, 1-5 Units, Subcutaneous, HS, Yamil Kilpatrick MD  insulin lispro, 5 Units, Subcutaneous, TID With Meals, Ingris Eason MD  levothyroxine, 25 mcg, Oral, Early Morning, Yamil Kilpatrick MD  ondansetron, 4 mg, Intravenous, Q6H PRN, Yamil Kilpatrick MD  oxybutynin, 5 mg, Oral, TID, Yamil Kilpatrick MD  polyethylene glycol, 17 g, Oral, QAM, Yamil Kilpatrick MD  pravastatin, 20 mg, Oral, Daily With Radha Rodriguez MD  sodium chloride (PF), 3 mL, Intravenous, Q1H PRN, Jean Billy MD  traZODone, 50 mg, Oral, HS, Cristian Huynh MD  valproate sodium, 500 mg, Intravenous, BID, Sofie Dial PA-C, Last Rate: 500 mg (08/02/20 3049)         Today, Patient Was Seen By: Cristian Huynh MD    ** Please Note: This note has been constructed using a voice recognition system   **

## 2020-08-02 NOTE — ASSESSMENT & PLAN NOTE
· Pt was having dysphagia problem at the group home prior to the admission  · Speech following    · Will try to obtain VBS eval tomorrow  · AchR pending

## 2020-08-02 NOTE — PLAN OF CARE
Problem: NEUROSENSORY - ADULT  Goal: Achieves maximal functionality and self care  Description: INTERVENTIONS  - Monitor swallowing and airway patency with patient fatigue and changes in neurological status  - Encourage and assist patient to increase activity and self care     - Encourage visually impaired, hearing impaired and aphasic patients to use assistive/communication devices  Outcome: Progressing

## 2020-08-02 NOTE — ASSESSMENT & PLAN NOTE
· Due to unresponsiveness and lethargy a rapid response was called yesterday  · No intervention was needed as patient was hemodynamically stable and alert after initiation of rapid  · Neurology was consulted, for possible seizure activity in patient  · Felt due to UTI r/o seizure  · Patient seems to be mentating close to baseline today  Plan:   -Random EEG no epileptiform activity   -Rec to resume her Valproic acid at home dose    -continue treatment with IV antibiotics  Will discontinue Monday after a total of 7 days

## 2020-08-02 NOTE — ASSESSMENT & PLAN NOTE
Lab Results   Component Value Date    HGBA1C 5 8 (H) 08/01/2020       Recent Labs     08/01/20  1134 08/01/20  1615 08/01/20  2217 08/02/20  0816   POCGLU 234* 267* 211* 210*       Blood Sugar Average: Last 72 hrs:  (P) 263 [id number]     · Patient's average blood sugars are last 24 hours have ranged between 210-267  ·  Lantus to 10  units of Lantus, and add 5 units humalog before each meal   · Patient's A1c is 5 8  · Will resume metformin on discharge

## 2020-08-03 LAB
ANION GAP SERPL CALCULATED.3IONS-SCNC: 6 MMOL/L (ref 4–13)
BUN SERPL-MCNC: 12 MG/DL (ref 5–25)
CALCIUM SERPL-MCNC: 7.6 MG/DL (ref 8.3–10.1)
CHLORIDE SERPL-SCNC: 111 MMOL/L (ref 100–108)
CO2 SERPL-SCNC: 31 MMOL/L (ref 21–32)
CREAT SERPL-MCNC: 0.88 MG/DL (ref 0.6–1.3)
GFR SERPL CREATININE-BSD FRML MDRD: 73 ML/MIN/1.73SQ M
GLUCOSE SERPL-MCNC: 113 MG/DL (ref 65–140)
GLUCOSE SERPL-MCNC: 122 MG/DL (ref 65–140)
GLUCOSE SERPL-MCNC: 138 MG/DL (ref 65–140)
GLUCOSE SERPL-MCNC: 172 MG/DL (ref 65–140)
GLUCOSE SERPL-MCNC: 292 MG/DL (ref 65–140)
POTASSIUM SERPL-SCNC: 2.9 MMOL/L (ref 3.5–5.3)
SODIUM SERPL-SCNC: 148 MMOL/L (ref 136–145)

## 2020-08-03 PROCEDURE — 92526 ORAL FUNCTION THERAPY: CPT

## 2020-08-03 PROCEDURE — 80048 BASIC METABOLIC PNL TOTAL CA: CPT | Performed by: INTERNAL MEDICINE

## 2020-08-03 PROCEDURE — 99232 SBSQ HOSP IP/OBS MODERATE 35: CPT | Performed by: INTERNAL MEDICINE

## 2020-08-03 PROCEDURE — 82948 REAGENT STRIP/BLOOD GLUCOSE: CPT

## 2020-08-03 RX ORDER — DEXTROSE AND POTASSIUM CHLORIDE 5; .15 G/100ML; G/100ML
50 SOLUTION INTRAVENOUS CONTINUOUS
Status: DISCONTINUED | OUTPATIENT
Start: 2020-08-03 | End: 2020-08-04

## 2020-08-03 RX ORDER — POTASSIUM CHLORIDE 14.9 MG/ML
20 INJECTION INTRAVENOUS ONCE
Status: COMPLETED | OUTPATIENT
Start: 2020-08-03 | End: 2020-08-03

## 2020-08-03 RX ORDER — POTASSIUM CHLORIDE 20 MEQ/1
40 TABLET, EXTENDED RELEASE ORAL ONCE
Status: COMPLETED | OUTPATIENT
Start: 2020-08-03 | End: 2020-08-03

## 2020-08-03 RX ADMIN — POTASSIUM CHLORIDE 40 MEQ: 1500 TABLET, EXTENDED RELEASE ORAL at 11:39

## 2020-08-03 RX ADMIN — INSULIN GLARGINE 10 UNITS: 100 INJECTION, SOLUTION SUBCUTANEOUS at 22:06

## 2020-08-03 RX ADMIN — INSULIN LISPRO 2 UNITS: 100 INJECTION, SOLUTION INTRAVENOUS; SUBCUTANEOUS at 22:01

## 2020-08-03 RX ADMIN — CALCIUM CARBONATE (ANTACID) CHEW TAB 500 MG 500 MG: 500 CHEW TAB at 18:16

## 2020-08-03 RX ADMIN — ENOXAPARIN SODIUM 30 MG: 30 INJECTION SUBCUTANEOUS at 12:00

## 2020-08-03 RX ADMIN — OXYBUTYNIN 5 MG: 5 TABLET, FILM COATED, EXTENDED RELEASE ORAL at 18:17

## 2020-08-03 RX ADMIN — DEXTROSE AND POTASSIUM CHLORIDE 50 ML/HR: 5; .15 SOLUTION INTRAVENOUS at 12:06

## 2020-08-03 RX ADMIN — VALPROATE SODIUM 500 MG: 100 INJECTION, SOLUTION INTRAVENOUS at 11:34

## 2020-08-03 RX ADMIN — CEFTRIAXONE SODIUM 1000 MG: 10 INJECTION, POWDER, FOR SOLUTION INTRAVENOUS at 13:04

## 2020-08-03 RX ADMIN — TRAZODONE HYDROCHLORIDE 50 MG: 50 TABLET ORAL at 22:01

## 2020-08-03 RX ADMIN — PRAVASTATIN SODIUM 20 MG: 20 TABLET ORAL at 18:16

## 2020-08-03 RX ADMIN — ARIPIPRAZOLE 30 MG: 10 TABLET ORAL at 22:02

## 2020-08-03 RX ADMIN — POTASSIUM CHLORIDE 20 MEQ: 14.9 INJECTION, SOLUTION INTRAVENOUS at 14:00

## 2020-08-03 RX ADMIN — VALPROATE SODIUM 500 MG: 100 INJECTION, SOLUTION INTRAVENOUS at 18:06

## 2020-08-03 RX ADMIN — BACLOFEN 10 MG: 10 TABLET ORAL at 18:16

## 2020-08-03 NOTE — ASSESSMENT & PLAN NOTE
· Pt was having dysphagia problem at the group home prior to the admission  · Speech following  · Video barium study obtained today  · Showed patient does not have a good cough reflex and is high risk for aspiration  · Patient will have to be placed on pureed with pudding thick liquid diet upon discharge  · Patient will have to follow-up with speech in 1 month

## 2020-08-03 NOTE — SPEECH THERAPY NOTE
Speech Language/Pathology    Speech/Language Pathology Progress Note    Patient Name: Madyson Velazco  [id number] Date: 8/3/2020       Subjective:  VBS ordered by MD, however was unable to accommodate study today due to radiology scheduling; planned to assess at bedside  Pt awake/alert positioned upright for po intake  Per RN, pt refused meds today, shouting "no!  " Noted in chart pt only took 3 sips of liquid at lunch today  Objective:  Pt seen for dysphagia tx to assess tolerance of current diet  Pt refused majority of po offered, shouting "No!" Accepted 3 tsps puree, 1 bite soft solid, 1 tsp NTL, and 1 sip of thin via straw  Fair retrieval via tsp, unable to draw NTL from straw  Slow manipulation/transfer w/ puree  Prolonged mastication of soft solid  No significant oral residue noted  Immediate cough w/ thin via straw  Noted double swallows w/ NTL  No overt s/s aspiration w/ highly limited amounts of NTL/puree  Assessment:  Highly limited assessment secondary to pt refusal for po intake  Tolerated MIN amounts of current diet w/o overt s/s aspiration; suspect cont w/ aspiration risk w/ thin liquids       Plan/Recommendations:  Cont current diet for now   VBS scheduled for tomorrow, however rec ST to f/u prior to VBS to ensure pt participation given refusal for po today

## 2020-08-03 NOTE — PROGRESS NOTES
Progress Note - Mozumm Grams 1960, 64 y o  female MRN: [id number]    Unit/Bed#: S -01 Encounter: [id number]    Primary Care Provider: Fernando Ramírez DO   Date and time admitted to hospital: 7/26/2020 11:21 AM        * Toxic metabolic encephalopathy  Assessment & Plan  · Due to unresponsiveness and lethargy a rapid response was called yesterday  · No intervention was needed as patient was hemodynamically stable and alert after initiation of rapid  · Neurology was consulted, for possible seizure activity in patient  · Felt due to UTI r/o seizure  · Patient seems to be mentating close to baseline today  Plan:   -Random EEG no epileptiform activity   -Rec to resume her Valproic acid at home dose   -last dose of ceftriaxone today  Will be discontinued tomorrow  Acute cystitis without hematuria  Assessment & Plan  · Patient is afebrile today and does not have leukocytosis  · Final urine culture shows Enterobacter that is resistant to amoxicillin, ampicillin and cefazolin    · Final dose of antibiotics today  · Monitor Vital signs, CBC,           Cerebral palsy (HCC)  Assessment & Plan  · Mentates at baseline  · Has muscular rigidity secondary to cerebral palsy  · Frequent repositioning  · Baseline spastic quadriparesis and intellectual disability   Resides in a group home  · Caretaker mentioned gradual decline in the lase several months      Type 2 diabetes mellitus, without long-term current use of insulin [facility])  Assessment & Plan  Lab Results   Component Value Date    HGBA1C 5 8 (H) 08/01/2020       Recent Labs     08/02/20  1632 08/02/20  1731 08/02/20  2054 08/03/20  0713   POCGLU 85 110 133 122       Blood Sugar Average: Last 72 hrs:  (P) 251 [id number]     · Patient's average blood sugars are last 24 hours have ranged between 122-85  ·  On 10  units of Lantus, and add 5 units humalog before each meal   · Will discontinue mealtime insulin if patient's blood glucose levels are within normal limits  · Patient's A1c is 5 8  · Will resume metformin on discharge  Hypothyroidism  Assessment & Plan  · Most recent TSH in  6  · Is on Synthroid 25mcg      Hypernatremia  Assessment & Plan  Patient has Na level of 148today  Given patient's blood glucose levels are within normal limits, we will start patient on 1 L of D5 with 20 mEq of potassium  Hypokalemia  Assessment & Plan  Patient has potassium of 2 9 today,  -patient given 40 of K-Dur, and 20 of KCl  VTE Pharmacologic Prophylaxis:   Pharmacologic: Enoxaparin (Lovenox)  Mechanical VTE Prophylaxis in Place: Yes    Discussions with Specialists or Other Care Team Provider:  Neurology, GI, Speech & Swallow,    Education and Discussions with Family / Patient:  Spoke extensively with patient's caretaker Gladys from group home  Current Length of Stay: 8 day(s)    Current Patient Status: Inpatient     Discharge Plan / Estimated Discharge Date: Within 24-48 hours pending video barium  Code Status: Level 1 - Full Code      Subjective:   Patient is awake and alert today and greeted me with a hello  Is able to answer simple questions  Is able to follow commands and track me with her eyes  Objective:     Vitals:   Temp (24hrs), Av 8 °F (36 6 °C), Min:97 2 °F (36 2 °C), Max:98 2 °F (36 8 °C)    Temp:  [97 2 °F (36 2 °C)-98 2 °F (36 8 °C)] 97 5 °F (36 4 °C)  HR:  [70-93] 70  Resp:  [18] 18  BP: (110-127)/(65-99) 127/99  SpO2:  [86 %-99 %] 99 %  Body mass index is 18 57 kg/m²  Input and Output Summary (last 24 hours): Intake/Output Summary (Last 24 hours) at 8/3/2020 1120  Last data filed at 2020 1738  Gross per 24 hour   Intake 120 ml   Output 379 ml   Net -259 ml       Physical Exam:     Physical Exam   Constitutional: She appears well-developed and well-nourished  No distress  HENT:   Head: Normocephalic and atraumatic  Eyes: Conjunctivae are normal  Right eye exhibits no discharge   Left eye exhibits no discharge  No scleral icterus  Cardiovascular: Normal rate, regular rhythm, S1 normal, S2 normal, normal heart sounds and intact distal pulses  Pulmonary/Chest: Breath sounds normal  No respiratory distress  She has no wheezes  Abdominal: Soft  Bowel sounds are normal  She exhibits no distension  There is no abdominal tenderness  Neurological: She is alert  Skin: Skin is warm and dry  No erythema  No pallor  Additional Data:     Labs:    Results from last 7 days   Lab Units 08/01/20  1006   WBC Thousand/uL 7 54   HEMOGLOBIN g/dL 14 1   HEMATOCRIT % 40 7   PLATELETS Thousands/uL 179   NEUTROS PCT % 59   LYMPHS PCT % 29   MONOS PCT % 10   EOS PCT % 2     Results from last 7 days   Lab Units 08/03/20  0601   POTASSIUM mmol/L 2 9*   CHLORIDE mmol/L 111*   CO2 mmol/L 31   BUN mg/dL 12   CREATININE mg/dL 0 88   CALCIUM mg/dL 7 6*           * I Have Reviewed All Lab Data Listed Above  * Additional Pertinent Lab Tests Reviewed:  All Labs Within Last 24 Hours Reviewed    Imaging:    Imaging Reports Reviewed Today Include:   Imaging Personally Reviewed by Myself Includes:      Recent Cultures (last 7 days):           Last 24 Hours Medication List:   acetaminophen, 650 mg, Oral, Q6H PRN, Josh Pena MD  ARIPiprazole, 30 mg, Oral, HS, Josh Pena MD  artificial tear, , Both Eyes, HS PRN, Josh Pena MD  baclofen, 10 mg, Oral, TID, Josh Pena MD  calcium carbonate, 500 mg, Oral, BID With Meals, Josh Pena MD  cefTRIAXone, 1,000 mg, Intravenous, Q24H, Rodrick Acevedo MD, Last Rate: 1,000 mg (08/02/20 1207)  cholecalciferol, 2,000 Units, Oral, Daily, Josh Pena MD  citalopram, 40 mg, Oral, QAM, Josh Pena MD  dextrose 5 % with KCl 20 mEq/L, 50 mL/hr, Intravenous, Continuous, Rodrick Acevedo MD  docusate sodium, 100 mg, Oral, BID, Josh Pena MD  enoxaparin, 30 mg, Subcutaneous, Daily, Josh Pena MD  famotidine, 20 mg, Oral, Daily, Josh Pena MD  fluticasone, 1 spray, Nasal, Daily PRN, Promise Diana MD  guaiFENesin, 600 mg, Oral, BID PRN, Promise Diana MD  insulin glargine, 10 Units, Subcutaneous, HS, Ion Manning MD  insulin lispro, 1-5 Units, Subcutaneous, HS, Promise Diana MD  insulin lispro, 5 Units, Subcutaneous, TID With Meals, Ion Manning MD  levothyroxine, 25 mcg, Oral, Early Morning, Promise Diana MD  ondansetron, 4 mg, Intravenous, Q6H PRN, Promise Diana MD  oxybutynin, 5 mg, Oral, TID, Promise Diana MD  polyethylene glycol, 17 g, Oral, QAM, Promise Diana MD  potassium chloride, 40 mEq, Oral, Once, Saeed Johnson MD  potassium chloride, 20 mEq, Intravenous, Once, Ion Manning MD  pravastatin, 20 mg, Oral, Daily With Robbin Baig MD  sodium chloride (PF), 3 mL, Intravenous, Q1H PRN, Mery Pena MD  traZODone, 50 mg, Oral, HS, Saeed Johnson MD  valproate sodium, 500 mg, Intravenous, BID, Sofie Dial PA-C, Last Rate: 500 mg (08/02/20 1708)         Today, Patient Was Seen By: Saeed Johnson MD    ** Please Note: This note has been constructed using a voice recognition system   **

## 2020-08-03 NOTE — ASSESSMENT & PLAN NOTE
· Patient is afebrile today and does not have leukocytosis  · Final urine culture shows Enterobacter that is resistant to amoxicillin, ampicillin and cefazolin    · Final dose of antibiotics today    · Monitor Vital signs, CBC,

## 2020-08-03 NOTE — ASSESSMENT & PLAN NOTE
Lab Results   Component Value Date    HGBA1C 5 8 (H) 08/01/2020       Recent Labs     08/02/20  1632 08/02/20  1731 08/02/20 2054 08/03/20  0713   POCGLU 85 110 133 122       Blood Sugar Average: Last 72 hrs:  (P) 656 [id number]     · Patient's average blood sugars are last 24 hours have ranged between 122-85  ·  On 10  units of Lantus, and add 5 units humalog before each meal   · Will discontinue mealtime insulin if patient's blood glucose levels are within normal limits  · Patient's A1c is 5 8  · Will resume metformin on discharge

## 2020-08-03 NOTE — PLAN OF CARE
Problem: Potential for Falls  Goal: Patient will remain free of falls  Description: INTERVENTIONS:  - Assess patient frequently for physical needs  -  Identify cognitive and physical deficits and behaviors that affect risk of falls    -  [place] fall precautions as indicated by assessment   - Educate patient/family on patient safety including physical limitations  - Instruct patient to call for assistance with activity based on assessment  - Modify environment to reduce risk of injury  - Consider OT/PT consult to assist with strengthening/mobility  Outcome: Progressing     Problem: Prexisting or High Potential for Compromised Skin Integrity  Goal: Skin integrity is maintained or improved  Description: INTERVENTIONS:  - Identify patients at risk for skin breakdown  - Assess and monitor skin integrity  - Assess and monitor nutrition and hydration status  - Monitor labs   - Assess for incontinence   - Turn and reposition patient  - Assist with mobility/ambulation  - Relieve pressure over bony prominences  - Avoid friction and shearing  - Provide appropriate hygiene as needed including keeping skin clean and dry  - Evaluate need for skin moisturizer/barrier cream  - Collaborate with interdisciplinary team   - Patient/family teaching  - Consider wound care consult   Outcome: Progressing     Problem: NEUROSENSORY - ADULT  Goal: Achieves stable or improved neurological status  Description: INTERVENTIONS  - Monitor and report changes in neurological status  - Monitor vital signs such as temperature, blood pressure, glucose, and any other labs ordered   - Initiate measures to prevent increased intracranial pressure  - Monitor for seizure activity and implement precautions if appropriate      Outcome: Progressing  Goal: Achieves maximal functionality and self care  Description: INTERVENTIONS  - Monitor swallowing and airway patency with patient fatigue and changes in neurological status  - Encourage and assist patient to increase activity and self care  - Encourage visually impaired, hearing impaired and aphasic patients to use assistive/communication devices  Outcome: Progressing     Problem: PAIN - ADULT  Goal: Verbalizes/displays adequate comfort level or baseline comfort level  Description: Interventions:  - Encourage patient to monitor pain and request assistance  - Assess pain using appropriate pain scale  - Administer analgesics based on type and severity of pain and evaluate response  - Implement non-pharmacological measures as appropriate and evaluate response  - Consider cultural and social influences on pain and pain management  - Notify physician/advanced practitioner if interventions unsuccessful or patient reports new pain  Outcome: Progressing     Problem: INFECTION - ADULT  Goal: Absence or prevention of progression during hospitalization  Description: INTERVENTIONS:  - Assess and monitor for signs and symptoms of infection  - Monitor lab/diagnostic results  - Monitor all insertion sites, i e  indwelling lines, tubes, and drains  - Monitor endotracheal if appropriate and nasal secretions for changes in amount and color  - [place] appropriate cooling/warming therapies per order  - Administer medications as ordered  - Instruct and encourage patient and family to use good hand hygiene technique  - Identify and instruct in appropriate isolation precautions for identified infection/condition  Outcome: Progressing     Problem: SAFETY ADULT  Goal: Patient will remain free of falls  Description: INTERVENTIONS:  - Assess patient frequently for physical needs  -  Identify cognitive and physical deficits and behaviors that affect risk of falls    -  [place] fall precautions as indicated by assessment   - Educate patient/family on patient safety including physical limitations  - Instruct patient to call for assistance with activity based on assessment  - Modify environment to reduce risk of injury  - Consider OT/PT consult to assist with strengthening/mobility  Outcome: Progressing  Goal: Maintain or return to baseline ADL function  Description: INTERVENTIONS:  -  Assess patient's ability to carry out ADLs; assess patient's baseline for ADL function and identify physical deficits which impact ability to perform ADLs (bathing, care of mouth/teeth, toileting, grooming, dressing, etc )  - Assess/evaluate cause of self-care deficits   - Assess range of motion  - Assess patient's mobility; develop plan if impaired  - Assess patient's need for assistive devices and provide as appropriate  - Encourage maximum independence but intervene and supervise when necessary  - Involve family in performance of ADLs  - Assess for home care needs following discharge   - Consider OT consult to assist with ADL evaluation and planning for discharge  - Provide patient education as appropriate  Outcome: Progressing  Goal: Maintain or return mobility status to optimal level  Description: INTERVENTIONS:  - Assess patient's baseline mobility status (ambulation, transfers, stairs, etc )    - Identify cognitive and physical deficits and behaviors that affect mobility  - Identify mobility aids required to assist with transfers and/or ambulation (gait belt, sit-to-stand, lift, walker, cane, etc )  - [place] fall precautions as indicated by assessment  - Record patient progress and toleration of activity level on Mobility SBAR; progress patient to next Phase/Stage  - Instruct patient to call for assistance with activity based on assessment  - Consider rehabilitation consult to assist with strengthening/weightbearing, etc   Outcome: Progressing     Problem: DISCHARGE PLANNING  Goal: Discharge to home or other facility with appropriate resources  Description: INTERVENTIONS:  - Identify barriers to discharge w/patient and caregiver  - Arrange for needed discharge resources and transportation as appropriate  - Identify discharge learning needs (meds, wound care, etc )  - Arrange for interpretive services to assist at discharge as needed  - Refer to Case Management Department for coordinating discharge planning if the patient needs post-hospital services based on physician/advanced practitioner order or complex needs related to functional status, cognitive ability, or social support system  Outcome: Progressing     Problem: Knowledge Deficit  Goal: Patient/family/caregiver demonstrates understanding of disease process, treatment plan, medications, and discharge instructions  Description: Complete learning assessment and assess knowledge base  Interventions:  - Provide teaching at level of understanding  - Provide teaching via preferred learning methods  Outcome: Progressing     Problem: Nutrition/Hydration-ADULT  Goal: Nutrient/Hydration intake appropriate for improving, restoring or maintaining nutritional needs  Description: Monitor and assess patient's nutrition/hydration status for malnutrition  Collaborate with interdisciplinary team and initiate plan and interventions as ordered  Monitor patient's weight and dietary intake as ordered or per policy  Utilize nutrition screening tool and intervene as necessary  Determine patient's food preferences and provide high-protein, high-caloric foods as appropriate       INTERVENTIONS:  - Monitor oral intake, urinary output, labs, and treatment plans  - Assess nutrition and hydration status and recommend course of action  - Evaluate amount of meals eaten  - Assist patient with eating if necessary   - Allow adequate time for meals  - Recommend/ encourage appropriate diets, oral nutritional supplements, and vitamin/mineral supplements  - Order, calculate, and assess calorie counts as needed  - Recommend, monitor, and adjust tube feedings and TPN/PPN based on assessed needs  - Assess need for intravenous fluids  - Provide specific nutrition/hydration education as appropriate  - Include patient/family/caregiver in decisions related to nutrition  Outcome: Progressing

## 2020-08-03 NOTE — UTILIZATION REVIEW
Continued Stay Review    Date: 7/31/20                          Current Patient Class: inpatient  Current Level of Care: med surg    HPI:60 y o  female group home resident w/hx cerebral palsy, dm, and hypothyroid initially admitted on 7/26/20 as inpatient due to acute cystitis with encephalopathy  IVF, IV antbx were in progress  Assessment/Plan: 7/28: lethargic, opens eyes to name  Urine growing klebsiella and enterobacter  Transitioned to PO cefdinir  Holding sedatives  7/29: guardian is concerned and feels mental status is further declining  Did become more responsive to her name, but remained lethargic  Changed antbx to IV ceftriaxone  Needs further eval for dyaphagia  Neuro consulted  By the evening, no longer lethargic and following commands & answering questions when caretaker visited  Per neuro: not following commands, but holding a small plastic bear  When took it from her hand, she followed it but didn't pick it up  Wouldn't squeeze when the bear was placed back in her hand  Had low amplitude LUE tremor during noxious stim to BLE, grimaced while checking plantar reflexes  Suspect dysphagia related to UTI encephalopathy superimposed on her cerebral palsy  Nothing to suggest myasthenia gravis  7/30:more awake, felt to be back at baseline per caretaker  GI consulted: dysphagia ongoing x 2 days  Speech eval done earlier and pt had improved PO tolerance with conservative consistency  Pureed honey thick diet recommended  Her acute dysphagia is likely in setting of urosepsis  It is improving  Consider barium swallow  Will possibly need EGD      7/31: in AM-leaning to the side with drool coming from mouth  arousable only to firm sternal rub  Very lethargic with tongue sticking out  Rapid response team called, reconsult to neuro  Suspected of having had a seizure, as she improved after this episode   She has no prior seizure disorder diagnosed, but her cognitive impairment reduces seizure threshold, making her susceptible  IV depacon started, speech evaluated and changed to NPO  Gentle IVF and neuro checks in progress  Pertinent Labs/Diagnostic Results:   7/29 MRI brain: No acute intracranial abnormality  No restricted diffusion to suggest acute ischemia    Mild chronic small vessel ischemic changes and volume loss    Results from last 7 days   Lab Units 07/31/20  1134   SARS-COV-2  Negative     Results from last 7 days   Lab Units 08/01/20  1006 07/30/20  1042 07/29/20  0507 07/28/20  0712   WBC Thousand/uL 7 54 6 50 5 30 6 82   HEMOGLOBIN g/dL 14 1 13 7 14 0 12 9   HEMATOCRIT % 40 7 39 8 40 0 37 3   PLATELETS Thousands/uL 179 142* 143* 128*   NEUTROS ABS Thousands/µL 4 39  --  2 80  --          Results from last 7 days   Lab Units 08/03/20  0601 08/02/20  0723 08/01/20  1006 07/31/20  0451 07/30/20  1042   SODIUM mmol/L 148* 146* 149* 143 143   POTASSIUM mmol/L 2 9* 4 2 3 9 3 3* 3 2*   CHLORIDE mmol/L 111* 110* 111* 107 105   CO2 mmol/L 31 31 32 31 25   ANION GAP mmol/L 6 5 6 5 13   BUN mg/dL 12 10 12 10 13   CREATININE mg/dL 0 88 0 87 1 08 1 02 0 85   EGFR ml/min/1 73sq m 73 74 57 61 76   CALCIUM mg/dL 7 6* 7 8* 8 2* 8 0* 7 9*         Results from last 7 days   Lab Units 08/03/20  1124 08/03/20  0713 08/02/20  2054 08/02/20  1731 08/02/20  1632 08/02/20  1130 08/02/20  0816 08/01/20  2217 08/01/20  1615 08/01/20  1134 08/01/20  0825 07/31/20  2101   POC GLUCOSE mg/dl 138 122 133 110 85 223* 210* 211* 267* 234* 253* 359*     Results from last 7 days   Lab Units 08/03/20  0601 08/02/20  0723 08/01/20  1006 07/31/20  0451 07/30/20  1042 07/29/20  0507 07/28/20  0712   GLUCOSE RANDOM mg/dL 113 195* 244* 299* 196* 141* 101                                       Vital Signs:   Date/Time  Temp  Pulse  Resp  BP  MAP (mmHg)  SpO2  O2 Device  Patient Position - Orthostatic VS   07/31/20 1140    112Abnormal     119/81    95 %  None (Room air)     07/31/20 1112    119Abnormal   18  139/92           07/31/20 1105  98 6 °F (37 °C)      134/87    93 %  None (Room air)  Sitting   07/31/20 0700  97 6 °F (36 4 °C)  106Abnormal   18  139/94  112  92 %  None (Room air)  Lying   07/30/20 2201  97 8 °F (36 6 °C)  95  16  111/69    96 %  None (Room air)  Lying   07/30/20 2030              None (Room air)     07/30/20 1500  97 7 °F (36 5 °C)  95  16  144/84  109  96 %  None (Room air)  Lying   07/30/20 1100  97 6 °F (36 4 °C)  91    154/80  109  97 %  None (Room air)  Lying   07/30/20 0715              None (Room air)     07/30/20 0700  98 1 °F (36 7 °C)  86  14  136/81  103  98 %  None (Room air)  Lying   07/29/20 2200  98 °F (36 7 °C)  100  18  130/78    97 %  None (Room air)  Lying   07/29/20 1500  98 1 °F (36 7 °C)  106Abnormal   18  135/87  104  97 %  None (Room air)  Lying   07/29/20 1100  98 7 °F (37 1 °C)  107Abnormal     129/92    100 %  None (Room air)  Lying   07/29/20 0750              None (Room air)           Medications:   Scheduled Medications:    Medications:  ARIPiprazole 30 mg Oral HS   baclofen 10 mg Oral TID   calcium carbonate 500 mg Oral BID With Meals   cefTRIAXone 1,000 mg Intravenous Q24H   cholecalciferol 2,000 Units Oral Daily   citalopram 40 mg Oral QAM   docusate sodium 100 mg Oral BID   enoxaparin 30 mg Subcutaneous Daily   famotidine 20 mg Oral Daily   insulin glargine 5 Units Subcutaneous HS   insulin lispro 1-5 Units Subcutaneous TID AC   insulin lispro 1-5 Units Subcutaneous HS   levothyroxine 25 mcg Oral Early Morning   oxybutynin 5 mg Oral TID   polyethylene glycol 17 g Oral QAM   pravastatin 20 mg Oral Daily With Dinner   traZODone 50 mg Oral HS   valproate sodium 500 mg Intravenous BID   PO kdur x 1 7/31    Continuous IV Infusions:  dextrose 5 % with KCl 20 mEq/L, 50 mL/hr, Intravenous, Continuous     sodium chloride 0 9 % infusion   Dose: 50 mL/hr  Freq:  Once Route: IV  Start: 07/31/20 1700  PRN Meds:  acetaminophen, 650 mg, Oral, Q6H PRN  artificial tear, , Both Eyes, HS PRN  fluticasone, 1 spray, Nasal, Daily PRN  guaiFENesin, 600 mg, Oral, BID PRN  ondansetron, 4 mg, Intravenous, Q6H PRN  sodium chloride (PF), 3 mL, Intravenous, Q1H PRN        Discharge Plan: [facility]    Network Utilization Review Department  [email or web address]  ATTENTION: Please call with any questions or concerns to [phone] and carefully listen to the prompts so that you are directed to the right person  All voicemails are confidential   Penelope Doing all requests for admission clinical reviews, approved or denied determinations and any other requests to dedicated fax number below belonging to the campus where the patient is receiving treatment   List of dedicated fax numbers for the Facilities:  [address] DENIALS (Administrative/Medical Necessity) [phone]   [address] (Maternity/NICU/Pediatrics) [phone]   Lillie Handing [phone]   Micky Sheldon [phone]   Erica Yepez [phone]   Maribell Guerra [phone]   [address] [phone]   [facility]  [phone]   [address] [facility] [address] [facility]  [address]01 [facility] [address] [facility] [phone]

## 2020-08-03 NOTE — ASSESSMENT & PLAN NOTE
· Due to unresponsiveness and lethargy a rapid response was called yesterday  · No intervention was needed as patient was hemodynamically stable and alert after initiation of rapid  · Neurology was consulted, for possible seizure activity in patient  · Felt due to UTI r/o seizure  · Patient seems to be mentating close to baseline today  Plan:   -Random EEG no epileptiform activity   -Rec to resume her Valproic acid at home dose   -last dose of ceftriaxone today  Will be discontinued tomorrow

## 2020-08-03 NOTE — ASSESSMENT & PLAN NOTE
Patient has Na level of 148today  Given patient's blood glucose levels are within normal limits, we will start patient on 1 L of D5 with 20 mEq of potassium

## 2020-08-03 NOTE — PLAN OF CARE
Problem: Potential for Falls  Goal: Patient will remain free of falls  Description: INTERVENTIONS:  - Assess patient frequently for physical needs  -  Identify cognitive and physical deficits and behaviors that affect risk of falls    -  [place] fall precautions as indicated by assessment   - Educate patient/family on patient safety including physical limitations  - Instruct patient to call for assistance with activity based on assessment  - Modify environment to reduce risk of injury  - Consider OT/PT consult to assist with strengthening/mobility  Outcome: Progressing     Problem: Prexisting or High Potential for Compromised Skin Integrity  Goal: Skin integrity is maintained or improved  Description: INTERVENTIONS:  - Identify patients at risk for skin breakdown  - Assess and monitor skin integrity  - Assess and monitor nutrition and hydration status  - Monitor labs   - Assess for incontinence   - Turn and reposition patient  - Assist with mobility/ambulation  - Relieve pressure over bony prominences  - Avoid friction and shearing  - Provide appropriate hygiene as needed including keeping skin clean and dry  - Evaluate need for skin moisturizer/barrier cream  - Collaborate with interdisciplinary team   - Patient/family teaching  - Consider wound care consult   Outcome: Progressing     Problem: NEUROSENSORY - ADULT  Goal: Achieves stable or improved neurological status  Description: INTERVENTIONS  - Monitor and report changes in neurological status  - Monitor vital signs such as temperature, blood pressure, glucose, and any other labs ordered   - Initiate measures to prevent increased intracranial pressure  - Monitor for seizure activity and implement precautions if appropriate      Outcome: Progressing  Goal: Achieves maximal functionality and self care  Description: INTERVENTIONS  - Monitor swallowing and airway patency with patient fatigue and changes in neurological status  - Encourage and assist patient to increase activity and self care  - Encourage visually impaired, hearing impaired and aphasic patients to use assistive/communication devices  Outcome: Progressing     Problem: PAIN - ADULT  Goal: Verbalizes/displays adequate comfort level or baseline comfort level  Description: Interventions:  - Encourage patient to monitor pain and request assistance  - Assess pain using appropriate pain scale  - Administer analgesics based on type and severity of pain and evaluate response  - Implement non-pharmacological measures as appropriate and evaluate response  - Consider cultural and social influences on pain and pain management  - Notify physician/advanced practitioner if interventions unsuccessful or patient reports new pain  Outcome: Progressing     Problem: INFECTION - ADULT  Goal: Absence or prevention of progression during hospitalization  Description: INTERVENTIONS:  - Assess and monitor for signs and symptoms of infection  - Monitor lab/diagnostic results  - Monitor all insertion sites, i e  indwelling lines, tubes, and drains  - Monitor endotracheal if appropriate and nasal secretions for changes in amount and color  - [place] appropriate cooling/warming therapies per order  - Administer medications as ordered  - Instruct and encourage patient and family to use good hand hygiene technique  - Identify and instruct in appropriate isolation precautions for identified infection/condition  Outcome: Progressing     Problem: SAFETY ADULT  Goal: Patient will remain free of falls  Description: INTERVENTIONS:  - Assess patient frequently for physical needs  -  Identify cognitive and physical deficits and behaviors that affect risk of falls    -  [place] fall precautions as indicated by assessment   - Educate patient/family on patient safety including physical limitations  - Instruct patient to call for assistance with activity based on assessment  - Modify environment to reduce risk of injury  - Consider OT/PT consult to assist with strengthening/mobility  Outcome: Progressing  Goal: Maintain or return to baseline ADL function  Description: INTERVENTIONS:  -  Assess patient's ability to carry out ADLs; assess patient's baseline for ADL function and identify physical deficits which impact ability to perform ADLs (bathing, care of mouth/teeth, toileting, grooming, dressing, etc )  - Assess/evaluate cause of self-care deficits   - Assess range of motion  - Assess patient's mobility; develop plan if impaired  - Assess patient's need for assistive devices and provide as appropriate  - Encourage maximum independence but intervene and supervise when necessary  - Involve family in performance of ADLs  - Assess for home care needs following discharge   - Consider OT consult to assist with ADL evaluation and planning for discharge  - Provide patient education as appropriate  Outcome: Progressing  Goal: Maintain or return mobility status to optimal level  Description: INTERVENTIONS:  - Assess patient's baseline mobility status (ambulation, transfers, stairs, etc )    - Identify cognitive and physical deficits and behaviors that affect mobility  - Identify mobility aids required to assist with transfers and/or ambulation (gait belt, sit-to-stand, lift, walker, cane, etc )  - [place] fall precautions as indicated by assessment  - Record patient progress and toleration of activity level on Mobility SBAR; progress patient to next Phase/Stage  - Instruct patient to call for assistance with activity based on assessment  - Consider rehabilitation consult to assist with strengthening/weightbearing, etc   Outcome: Progressing     Problem: DISCHARGE PLANNING  Goal: Discharge to home or other facility with appropriate resources  Description: INTERVENTIONS:  - Identify barriers to discharge w/patient and caregiver  - Arrange for needed discharge resources and transportation as appropriate  - Identify discharge learning needs (meds, wound care, etc )  - Arrange for interpretive services to assist at discharge as needed  - Refer to Case Management Department for coordinating discharge planning if the patient needs post-hospital services based on physician/advanced practitioner order or complex needs related to functional status, cognitive ability, or social support system  Outcome: Progressing     Problem: Knowledge Deficit  Goal: Patient/family/caregiver demonstrates understanding of disease process, treatment plan, medications, and discharge instructions  Description: Complete learning assessment and assess knowledge base  Interventions:  - Provide teaching at level of understanding  - Provide teaching via preferred learning methods  Outcome: Progressing     Problem: Nutrition/Hydration-ADULT  Goal: Nutrient/Hydration intake appropriate for improving, restoring or maintaining nutritional needs  Description: Monitor and assess patient's nutrition/hydration status for malnutrition  Collaborate with interdisciplinary team and initiate plan and interventions as ordered  Monitor patient's weight and dietary intake as ordered or per policy  Utilize nutrition screening tool and intervene as necessary  Determine patient's food preferences and provide high-protein, high-caloric foods as appropriate       INTERVENTIONS:  - Monitor oral intake, urinary output, labs, and treatment plans  - Assess nutrition and hydration status and recommend course of action  - Evaluate amount of meals eaten  - Assist patient with eating if necessary   - Allow adequate time for meals  - Recommend/ encourage appropriate diets, oral nutritional supplements, and vitamin/mineral supplements  - Order, calculate, and assess calorie counts as needed  - Recommend, monitor, and adjust tube feedings and TPN/PPN based on assessed needs  - Assess need for intravenous fluids  - Provide specific nutrition/hydration education as appropriate  - Include patient/family/caregiver in decisions related to nutrition  Outcome: Progressing

## 2020-08-04 ENCOUNTER — APPOINTMENT (INPATIENT)
Dept: RADIOLOGY | Facility: HOSPITAL | Age: 60
DRG: 689 | End: 2020-08-04
Payer: COMMERCIAL

## 2020-08-04 LAB
ACHR BLOCK AB/ACHR TOTAL SFR SER: 17 % (ref 0–25)
ANION GAP SERPL CALCULATED.3IONS-SCNC: 4 MMOL/L (ref 4–13)
BUN SERPL-MCNC: 14 MG/DL (ref 5–25)
CALCIUM SERPL-MCNC: 7.7 MG/DL (ref 8.3–10.1)
CHLORIDE SERPL-SCNC: 110 MMOL/L (ref 100–108)
CO2 SERPL-SCNC: 30 MMOL/L (ref 21–32)
CREAT SERPL-MCNC: 0.93 MG/DL (ref 0.6–1.3)
GFR SERPL CREATININE-BSD FRML MDRD: 68 ML/MIN/1.73SQ M
GLUCOSE SERPL-MCNC: 165 MG/DL (ref 65–140)
GLUCOSE SERPL-MCNC: 207 MG/DL (ref 65–140)
GLUCOSE SERPL-MCNC: 212 MG/DL (ref 65–140)
GLUCOSE SERPL-MCNC: 214 MG/DL (ref 65–140)
GLUCOSE SERPL-MCNC: 256 MG/DL (ref 65–140)
GLUCOSE SERPL-MCNC: 282 MG/DL (ref 65–140)
POTASSIUM SERPL-SCNC: 3.7 MMOL/L (ref 3.5–5.3)
SODIUM SERPL-SCNC: 144 MMOL/L (ref 136–145)

## 2020-08-04 PROCEDURE — 80048 BASIC METABOLIC PNL TOTAL CA: CPT | Performed by: INTERNAL MEDICINE

## 2020-08-04 PROCEDURE — 92526 ORAL FUNCTION THERAPY: CPT

## 2020-08-04 PROCEDURE — 99232 SBSQ HOSP IP/OBS MODERATE 35: CPT | Performed by: INTERNAL MEDICINE

## 2020-08-04 PROCEDURE — 92611 MOTION FLUOROSCOPY/SWALLOW: CPT

## 2020-08-04 PROCEDURE — 74230 X-RAY XM SWLNG FUNCJ C+: CPT

## 2020-08-04 PROCEDURE — 82948 REAGENT STRIP/BLOOD GLUCOSE: CPT

## 2020-08-04 RX ADMIN — ENOXAPARIN SODIUM 30 MG: 30 INJECTION SUBCUTANEOUS at 09:27

## 2020-08-04 RX ADMIN — POLYETHYLENE GLYCOL 3350 17 G: 17 POWDER, FOR SOLUTION ORAL at 09:28

## 2020-08-04 RX ADMIN — CITALOPRAM HYDROBROMIDE 40 MG: 20 TABLET ORAL at 09:26

## 2020-08-04 RX ADMIN — INSULIN LISPRO 1 UNITS: 100 INJECTION, SOLUTION INTRAVENOUS; SUBCUTANEOUS at 21:57

## 2020-08-04 RX ADMIN — VALPROATE SODIUM 500 MG: 100 INJECTION, SOLUTION INTRAVENOUS at 09:28

## 2020-08-04 RX ADMIN — BACLOFEN 10 MG: 10 TABLET ORAL at 09:26

## 2020-08-04 RX ADMIN — CALCIUM CARBONATE (ANTACID) CHEW TAB 500 MG 500 MG: 500 CHEW TAB at 09:26

## 2020-08-04 RX ADMIN — OXYBUTYNIN 5 MG: 5 TABLET, FILM COATED, EXTENDED RELEASE ORAL at 09:27

## 2020-08-04 RX ADMIN — FAMOTIDINE 20 MG: 20 TABLET, FILM COATED ORAL at 09:27

## 2020-08-04 RX ADMIN — INSULIN GLARGINE 10 UNITS: 100 INJECTION, SOLUTION SUBCUTANEOUS at 21:55

## 2020-08-04 RX ADMIN — VALPROATE SODIUM 500 MG: 100 INJECTION, SOLUTION INTRAVENOUS at 17:57

## 2020-08-04 RX ADMIN — CHOLECALCIFEROL TAB 25 MCG (1000 UNIT) 2000 UNITS: 25 TAB at 09:26

## 2020-08-04 NOTE — SPEECH THERAPY NOTE
Speech Language/Pathology    Speech/Language Pathology Progress Note    Patient Name: Joselyn Whitlock  [id number] Date: 8/4/2020      Subjective:  Pt awake/alert positioned fully upright  Per PCA, pt accepted min amounts of po at breakfast, but tolerated w/o overt s/s aspiration  Spoke w/ RN; caregiver was present yesterday and pt reportedly feeds self at baseline  VBS tentatively scheduled for this afternoon  Objective:  Pt seen for dysphagia tx to assess tolerance of diet/ability to participate in VBS  Pt accepted only 2 tsps puree, piece of bennett cracker; pt refused majority of po trials and all liquids presented via cup/straw/tsp; repeatedly shouting, "no!" adequate retrieval via tsp  Slow but functional transfer w/ puree  Reduced mastication and bolus formation w/ solid  No significant oral residue  No overt s/s aspiration w/ highly limited amounts of po assessed       Assessment:  Pt cont to refuse majority of po offered, repeating "no!"     Plan/Recommendations:  Cont diet as ordered  Spoke w/ Resident MD/caregiver re: VBS - pt's caregiver, Abbi Bazan, stated she would like VBS prior to d/c and will be present for study this afternoon to assist w/ feeding pt

## 2020-08-04 NOTE — PROGRESS NOTES
Per SLIM to disregard Covid testing order for tonight  Pt not being discharged today   Per SLIM to swab pt in AM

## 2020-08-04 NOTE — PLAN OF CARE
Problem: Potential for Falls  Goal: Patient will remain free of falls  Description: INTERVENTIONS:  - Assess patient frequently for physical needs  -  Identify cognitive and physical deficits and behaviors that affect risk of falls    -  [place] fall precautions as indicated by assessment   - Educate patient/family on patient safety including physical limitations  - Instruct patient to call for assistance with activity based on assessment  - Modify environment to reduce risk of injury  - Consider OT/PT consult to assist with strengthening/mobility  Outcome: Progressing     Problem: Prexisting or High Potential for Compromised Skin Integrity  Goal: Skin integrity is maintained or improved  Description: INTERVENTIONS:  - Identify patients at risk for skin breakdown  - Assess and monitor skin integrity  - Assess and monitor nutrition and hydration status  - Monitor labs   - Assess for incontinence   - Turn and reposition patient  - Assist with mobility/ambulation  - Relieve pressure over bony prominences  - Avoid friction and shearing  - Provide appropriate hygiene as needed including keeping skin clean and dry  - Evaluate need for skin moisturizer/barrier cream  - Collaborate with interdisciplinary team   - Patient/family teaching  - Consider wound care consult   Outcome: Progressing     Problem: NEUROSENSORY - ADULT  Goal: Achieves stable or improved neurological status  Description: INTERVENTIONS  - Monitor and report changes in neurological status  - Monitor vital signs such as temperature, blood pressure, glucose, and any other labs ordered   - Initiate measures to prevent increased intracranial pressure  - Monitor for seizure activity and implement precautions if appropriate      Outcome: Progressing  Goal: Achieves maximal functionality and self care  Description: INTERVENTIONS  - Monitor swallowing and airway patency with patient fatigue and changes in neurological status  - Encourage and assist patient to increase activity and self care  - Encourage visually impaired, hearing impaired and aphasic patients to use assistive/communication devices  Outcome: Progressing     Problem: PAIN - ADULT  Goal: Verbalizes/displays adequate comfort level or baseline comfort level  Description: Interventions:  - Encourage patient to monitor pain and request assistance  - Assess pain using appropriate pain scale  - Administer analgesics based on type and severity of pain and evaluate response  - Implement non-pharmacological measures as appropriate and evaluate response  - Consider cultural and social influences on pain and pain management  - Notify physician/advanced practitioner if interventions unsuccessful or patient reports new pain  Outcome: Progressing     Problem: INFECTION - ADULT  Goal: Absence or prevention of progression during hospitalization  Description: INTERVENTIONS:  - Assess and monitor for signs and symptoms of infection  - Monitor lab/diagnostic results  - Monitor all insertion sites, i e  indwelling lines, tubes, and drains  - Monitor endotracheal if appropriate and nasal secretions for changes in amount and color  - [place] appropriate cooling/warming therapies per order  - Administer medications as ordered  - Instruct and encourage patient and family to use good hand hygiene technique  - Identify and instruct in appropriate isolation precautions for identified infection/condition  Outcome: Progressing     Problem: SAFETY ADULT  Goal: Patient will remain free of falls  Description: INTERVENTIONS:  - Assess patient frequently for physical needs  -  Identify cognitive and physical deficits and behaviors that affect risk of falls    -  [place] fall precautions as indicated by assessment   - Educate patient/family on patient safety including physical limitations  - Instruct patient to call for assistance with activity based on assessment  - Modify environment to reduce risk of injury  - Consider OT/PT consult to assist with strengthening/mobility  Outcome: Progressing  Goal: Maintain or return to baseline ADL function  Description: INTERVENTIONS:  -  Assess patient's ability to carry out ADLs; assess patient's baseline for ADL function and identify physical deficits which impact ability to perform ADLs (bathing, care of mouth/teeth, toileting, grooming, dressing, etc )  - Assess/evaluate cause of self-care deficits   - Assess range of motion  - Assess patient's mobility; develop plan if impaired  - Assess patient's need for assistive devices and provide as appropriate  - Encourage maximum independence but intervene and supervise when necessary  - Involve family in performance of ADLs  - Assess for home care needs following discharge   - Consider OT consult to assist with ADL evaluation and planning for discharge  - Provide patient education as appropriate  Outcome: Progressing  Goal: Maintain or return mobility status to optimal level  Description: INTERVENTIONS:  - Assess patient's baseline mobility status (ambulation, transfers, stairs, etc )    - Identify cognitive and physical deficits and behaviors that affect mobility  - Identify mobility aids required to assist with transfers and/or ambulation (gait belt, sit-to-stand, lift, walker, cane, etc )  - [place] fall precautions as indicated by assessment  - Record patient progress and toleration of activity level on Mobility SBAR; progress patient to next Phase/Stage  - Instruct patient to call for assistance with activity based on assessment  - Consider rehabilitation consult to assist with strengthening/weightbearing, etc   Outcome: Progressing     Problem: DISCHARGE PLANNING  Goal: Discharge to home or other facility with appropriate resources  Description: INTERVENTIONS:  - Identify barriers to discharge w/patient and caregiver  - Arrange for needed discharge resources and transportation as appropriate  - Identify discharge learning needs (meds, wound care, etc )  - Arrange for interpretive services to assist at discharge as needed  - Refer to Case Management Department for coordinating discharge planning if the patient needs post-hospital services based on physician/advanced practitioner order or complex needs related to functional status, cognitive ability, or social support system  Outcome: Progressing     Problem: Knowledge Deficit  Goal: Patient/family/caregiver demonstrates understanding of disease process, treatment plan, medications, and discharge instructions  Description: Complete learning assessment and assess knowledge base  Interventions:  - Provide teaching at level of understanding  - Provide teaching via preferred learning methods  Outcome: Progressing     Problem: Nutrition/Hydration-ADULT  Goal: Nutrient/Hydration intake appropriate for improving, restoring or maintaining nutritional needs  Description: Monitor and assess patient's nutrition/hydration status for malnutrition  Collaborate with interdisciplinary team and initiate plan and interventions as ordered  Monitor patient's weight and dietary intake as ordered or per policy  Utilize nutrition screening tool and intervene as necessary  Determine patient's food preferences and provide high-protein, high-caloric foods as appropriate       INTERVENTIONS:  - Monitor oral intake, urinary output, labs, and treatment plans  - Assess nutrition and hydration status and recommend course of action  - Evaluate amount of meals eaten  - Assist patient with eating if necessary   - Allow adequate time for meals  - Recommend/ encourage appropriate diets, oral nutritional supplements, and vitamin/mineral supplements  - Order, calculate, and assess calorie counts as needed  - Recommend, monitor, and adjust tube feedings and TPN/PPN based on assessed needs  - Assess need for intravenous fluids  - Provide specific nutrition/hydration education as appropriate  - Include patient/family/caregiver in decisions related to nutrition  Outcome: Progressing

## 2020-08-04 NOTE — ASSESSMENT & PLAN NOTE
· Mentates at baseline  · Has muscular rigidity secondary to cerebral palsy  · Frequent repositioning  · Baseline spastic quadriparesis and intellectual disability   Resides in a group home  · Caretaker mentioned gradual decline in the lase several months  · Patient will be discharged to group home

## 2020-08-04 NOTE — ASSESSMENT & PLAN NOTE
· Mentates at baseline  · Has muscular rigidity secondary to cerebral palsy  · Frequent repositioning  · Baseline spastic quadriparesis and intellectual disability   Resides in a group home  · Caretaker mentioned gradual decline in the lase several months  · Patient will be discharged to group home tomorrow

## 2020-08-04 NOTE — ASSESSMENT & PLAN NOTE
· Due to unresponsiveness and lethargy a rapid response was called yesterday  · No intervention was needed as patient was hemodynamically stable and alert after initiation of rapid  · Neurology was consulted, for possible seizure activity in patient  · Felt due to UTI r/o seizure  · Patient seems to be mentating close to baseline today      Plan:   -Random EEG no epileptiform activity   -Rec to resume her Valproic acid at home dose   -patient ceftriaxone discontinued

## 2020-08-04 NOTE — ASSESSMENT & PLAN NOTE
Lab Results   Component Value Date    HGBA1C 5 8 (H) 08/01/2020       Recent Labs     08/04/20  0645 08/04/20  0655 08/04/20  1125 08/04/20  1539   POCGLU 207* 212* 256* 282*       Blood Sugar Average: Last 72 hrs:  (P) 783 [id number]     · Patient's average blood sugars are last 24 hours have ranged between 207-208  ·  On 10  units of Lantus, and add 5 units humalog before each meal   · Will discontinue mealtime insulin if patient's blood glucose levels are within normal limits  · Patient's A1c is 5 8  · Will resume metformin on discharge

## 2020-08-04 NOTE — DISCHARGE SUMMARY
Discharge- Swati Reyes 1960, 64 y o  female MRN: [id number]    Unit/Bed#: S -01 Encounter: [id number]    Primary Care Provider: Kathy Moses,    Date and time admitted to hospital: 7/26/2020 11:21 AM        * Toxic metabolic encephalopathy  Assessment & Plan  · Due to unresponsiveness and lethargy a rapid response was called yesterday  · No intervention was needed as patient was hemodynamically stable and alert after initiation of rapid  · Neurology was consulted, for possible seizure activity in patient  · Felt due to UTI r/o seizure  · Patient seems to be mentating close to baseline today  Plan:   -Random EEG no epileptiform activity   -Rec to resume her Valproic acid at home dose   -patient ceftriaxone discontinued        Dysphagia  Assessment & Plan  · Pt was having dysphagia problem at the group home prior to the admission  · Speech following  · Video barium study obtained today  · Showed patient does not have a good cough reflex and is high risk for aspiration  · Patient will have to be placed on pureed with pudding thick liquid diet upon discharge  · Patient will have to follow-up with speech in 1 month  Acute cystitis without hematuria  Assessment & Plan  · Patient is afebrile today and does not have leukocytosis  · Final urine culture shows Enterobacter that is resistant to amoxicillin, ampicillin and cefazolin    · Final dose of antibiotics today  · Monitor Vital signs, CBC,           Cerebral palsy (HCC)  Assessment & Plan  · Mentates at baseline  · Has muscular rigidity secondary to cerebral palsy  · Frequent repositioning  · Baseline spastic quadriparesis and intellectual disability   Resides in a group home  · Caretaker mentioned gradual decline in the lase several months  · Patient will be discharged to group home       Type 2 diabetes mellitus, without long-term current use of insulin (HCC)  Assessment & Plan  Lab Results   Component Value Date    HGBA1C 5 8 (H) 08/01/2020       Recent Labs     08/04/20  0645 08/04/20  0655 08/04/20  1125 08/04/20  1539   POCGLU 207* 212* 256* 282*       Blood Sugar Average: Last 72 hrs:  (P) 436 [id number]     · Patient's average blood sugars are last 24 hours have ranged between 207-208  ·  On 10  units of Lantus, and add 5 units humalog before each meal   · Will discontinue mealtime insulin if patient's blood glucose levels are within normal limits  · Patient's A1c is 5 8  · Will resume metformin on discharge  Hypothyroidism  Assessment & Plan  · Most recent TSH in March- 2 6  · Is on Synthroid 25mcg      Hypernatremia  Assessment & Plan  Patient's sodium levels 144 today  Will discontinue fluids  Discharging Resident Physician: Tj Law MD  Attending: Carmen Diop DO  PCP: Mary Kerr DO  Admission Date: 7/26/2020  Discharge Date: 08/05/20    Disposition:     Group Home / [place] at [facility]    Reason for Admission:  Altered mental status most likely secondary to UTI  Consultations During Hospital Stay:  · Neurology, GI, speech and swallow    Procedures Performed:     · EEG and video barium swallow  Significant Findings / Test Results:     MRI Brain:    No acute intracranial abnormality  No restricted diffusion to suggest acute ischemia  Mild chronic small vessel ischemic changes and volume loss  Incidental Findings:   ·      Test Results Pending at Discharge (will require follow up):   ·      Outpatient Tests Requested:  · Speech and swallow follow-up  Complications:  Patient had rapid response due to increased lethargy and altered mental status during hospitalization  Most likely cause this was due to toxic metabolic encephalopathy secondary to UTI versus possible subclinical seizures  Patient received EEG that did not any findings indicating seizure activity      Hospital Course:     Carri Meade is a 64 y o  female patient who originally presented to the hospital on 7/26/2020 due to altered mental status from an unresolved UTI  Patient was receiving Keflex prior to admission for UTI that was Enterobacter positive at group home  On admission patient was tachycardic with altered mental status  Patient did not have fevers, chills or leukocytosis  Patient's lactic acid was elevated at 4 1 On repeat urine culture in the hospital patient's sensitivities panel showed Klebsiella and Enterobacter growth with resistance to Keflex, and patient was switched to IV Rocephin  Patient responded well to IV ceftriaxone, and was eventually switched to p o  Omnicef  However patient started to developed increasing lethargy, and patient was unable to take p o , and was forced to switch back to IV Rocephin  Patient has improved mental status on rocephin  Has remained afebrile and with out leukocytosis  Patient's dysphagia was evaluated by speech and swallow  It is recommended that the patient be on dysphagia 1 with     Condition at Discharge: good     Discharge Day Visit / Exam:     Subjective:  Patient responded to her name  Is able to follow basic commands or say "no"  Is at baseline per caretaker as well  Vitals: Blood Pressure: 138/80 (08/04/20 1500)  Pulse: 85 (08/04/20 1500)  Temperature: 97 8 °F (36 6 °C) (08/04/20 1500)  Temp Source: Axillary (08/04/20 1500)  Respirations: 18 (08/04/20 1500)  Height: 4' 10" (07/26/20 1125)  Weight - Scale: 40 4 kg (89 lb 1 1 oz) (08/04/20 0600)  SpO2: 99 % (08/04/20 1500)  Exam:   Physical Exam   Constitutional: She appears well-developed and well-nourished  No distress  Eyes: Pupils are equal, round, and reactive to light  Conjunctivae are normal  Right eye exhibits no discharge  Left eye exhibits no discharge  Cardiovascular: Normal rate, regular rhythm, S1 normal, S2 normal and normal heart sounds  Pulmonary/Chest: Effort normal and breath sounds normal  No respiratory distress  She has no wheezes  Abdominal: Soft   Bowel sounds are normal  She exhibits no distension  There is no abdominal tenderness  Musculoskeletal:         General: No tenderness, deformity or edema  Neurological: She is alert  Skin: Skin is warm and dry  Capillary refill takes less than 2 seconds  No rash noted  She is not diaphoretic  No erythema  No pallor  Discussion with Family: Discussed with patient's caretaker, Jair Lazaro  Discharge instructions/Information to patient and family:   See after visit summary for information provided to patient and family  Provisions for Follow-Up Care:  See after visit summary for information related to follow-up care and any pertinent home health orders  Planned Readmission: no     Discharge Medications:  See after visit summary for reconciled discharge medications provided to patient and family        ** Please Note: This note has been constructed using a voice recognition system **

## 2020-08-04 NOTE — SOCIAL WORK
CM spoke with SLIM, VBS completed and patient is stable for return to group home today  ST has provided diet recommendations and LINDA has discussed with group home staff, who will provide transportation home

## 2020-08-04 NOTE — PROGRESS NOTES
Progress Note - Carri Meade 1960, 64 y o  female MRN: [id number]    Unit/Bed#: S -01 Encounter: [id number]    Primary Care Provider: Mary Kerr DO   Date and time admitted to hospital: 7/26/2020 11:21 AM        * Toxic metabolic encephalopathy  Assessment & Plan  · Due to unresponsiveness and lethargy a rapid response was called yesterday  · No intervention was needed as patient was hemodynamically stable and alert after initiation of rapid  · Neurology was consulted, for possible seizure activity in patient  · Felt due to UTI r/o seizure  · Patient seems to be mentating close to baseline today  Plan:   -Random EEG no epileptiform activity   -Rec to resume her Valproic acid at home dose   -patient ceftriaxone discontinued        Dysphagia  Assessment & Plan  · Pt was having dysphagia problem at the group home prior to the admission  · Speech following  · Video barium study obtained today  · Showed patient does not have a good cough reflex and is high risk for aspiration  · Patient will have to be placed on pureed with pudding thick liquid diet upon discharge  · Patient will have to follow-up with speech in 1 month  Cerebral palsy (HCC)  Assessment & Plan  · Mentates at baseline  · Has muscular rigidity secondary to cerebral palsy  · Frequent repositioning  · Baseline spastic quadriparesis and intellectual disability   Resides in a group home  · Caretaker mentioned gradual decline in the lase several months  · Patient will be discharged to group home tomorrow      Type 2 diabetes mellitus, without long-term current use of insulin [facility])  Assessment & Plan  Lab Results   Component Value Date    HGBA1C 5 8 (H) 08/01/2020       Recent Labs     08/04/20  0645 08/04/20  0655 08/04/20  1125 08/04/20  1539   POCGLU 207* 212* 256* 282*       Blood Sugar Average: Last 72 hrs:  (P) 079 [id number]     · Patient's average blood sugars are last 24 hours have ranged between 207-208  ·  On 10 units of Lantus, and add 5 units humalog before each meal   · Will discontinue mealtime insulin if patient's blood glucose levels are within normal limits  · Patient's A1c is 5 8  · Will resume metformin on discharge  Hypothyroidism  Assessment & Plan  · Most recent TSH in  6  · Is on Synthroid 25mcg      Hypernatremia  Assessment & Plan  Patient's sodium levels 144 today  Will discontinue fluids  VTE Pharmacologic Prophylaxis:   Pharmacologic: Enoxaparin (Lovenox)  Mechanical VTE Prophylaxis in Place: Yes    Discussions with Specialists or Other Care Team Provider:  GI, Neurology, speech and swallow    Education and Discussions with Family / Patient:  Spoke extensively to patient's caretaker, Veto Sol, and expressed to her the patient will have to be on a puree, pudding thick diet  Current Length of Stay: 9 day(s)    Current Patient Status: Inpatient     Discharge Plan / Estimated Discharge Date:  Tomorrow morning  Code Status: Level 1 - Full Code      Subjective:   Patient is mentating a lot better today  States greets me with a "hi" and a smile  Patient says "no" when I ask her for any complaints, but this is baseline per caretaker  Patient is able to follow some commands  Objective:     Vitals:   Temp (24hrs), Av 6 °F (36 4 °C), Min:97 3 °F (36 3 °C), Max:97 8 °F (36 6 °C)    Temp:  [97 3 °F (36 3 °C)-97 8 °F (36 6 °C)] 97 8 °F (36 6 °C)  HR:  [73-85] 85  Resp:  [18-19] 18  BP: (124-138)/(80-88) 138/80  SpO2:  [97 %-99 %] 99 %  Body mass index is 18 61 kg/m²  Input and Output Summary (last 24 hours): Intake/Output Summary (Last 24 hours) at 2020 1703  Last data filed at 2020 1300  Gross per 24 hour   Intake 50 ml   Output 680 ml   Net -630 ml       Physical Exam:     Physical Exam   Constitutional: She appears well-developed and well-nourished  No distress  HENT:   Head: Normocephalic and atraumatic     Eyes: Pupils are equal, round, and reactive to light  Conjunctivae are normal  Right eye exhibits no discharge  Left eye exhibits no discharge  Cardiovascular: Regular rhythm, S1 normal, S2 normal and normal heart sounds  Tachycardia present  Pulmonary/Chest: Effort normal and breath sounds normal  No respiratory distress  She has no wheezes  Abdominal: Soft  Bowel sounds are normal  She exhibits no distension  There is no abdominal tenderness  Musculoskeletal:         General: No tenderness, deformity or edema  Neurological: She is alert  Skin: Skin is warm and dry  Capillary refill takes less than 2 seconds  No erythema  No pallor  Additional Data:     Labs:    Results from last 7 days   Lab Units 08/01/20  1006   WBC Thousand/uL 7 54   HEMOGLOBIN g/dL 14 1   HEMATOCRIT % 40 7   PLATELETS Thousands/uL 179   NEUTROS PCT % 59   LYMPHS PCT % 29   MONOS PCT % 10   EOS PCT % 2     Results from last 7 days   Lab Units 08/04/20  0542   POTASSIUM mmol/L 3 7   CHLORIDE mmol/L 110*   CO2 mmol/L 30   BUN mg/dL 14   CREATININE mg/dL 0 93   CALCIUM mg/dL 7 7*           * I Have Reviewed All Lab Data Listed Above  * Additional Pertinent Lab Tests Reviewed:  All Labs Within Last 24 Hours Reviewed    Imaging:    Imaging Reports Reviewed Today Include:  Video barium study  Imaging Personally Reviewed by Myself Includes:      Recent Cultures (last 7 days):           Last 24 Hours Medication List:   acetaminophen, 650 mg, Oral, Q6H PRN, Catie Vazquez MD  ARIPiprazole, 30 mg, Oral, HS, Catie Vazquez MD  artificial tear, , Both Eyes, HS PRN, Catie Vazquez MD  baclofen, 10 mg, Oral, TID, Catie Vazquez MD  calcium carbonate, 500 mg, Oral, BID With Meals, Catie Vazquez MD  cholecalciferol, 2,000 Units, Oral, Daily, Catie Vazquez MD  citalopram, 40 mg, Oral, QAM, Catie Vazquez MD  docusate sodium, 100 mg, Oral, BID, Catie Vazquez MD  enoxaparin, 30 mg, Subcutaneous, Daily, Catie Vazquez MD  famotidine, 20 mg, Oral, Daily, Pablo Lofton Mariah Boss MD  fluticasone, 1 spray, Nasal, Daily PRN, Michael Laboy MD  guaiFENesin, 600 mg, Oral, BID PRN, Michael Laboy MD  insulin glargine, 10 Units, Subcutaneous, HS, Romel Garcia MD  insulin lispro, 1-5 Units, Subcutaneous, HS, Michael Laboy MD  insulin lispro, 5 Units, Subcutaneous, TID With Meals, Romel Garcia MD  levothyroxine, 25 mcg, Oral, Early Morning, Michael Laboy MD  ondansetron, 4 mg, Intravenous, Q6H PRN, Michael Laboy MD  oxybutynin, 5 mg, Oral, TID, Michael Laboy MD  polyethylene glycol, 17 g, Oral, QAM, Michael Laboy MD  pravastatin, 20 mg, Oral, Daily With Rolanda Hall MD  sodium chloride (PF), 3 mL, Intravenous, Q1H PRN, Matt Gabriel MD  traZODone, 50 mg, Oral, HS, Dae Iglesias MD  valproate sodium, 500 mg, Intravenous, BID, Sofie Dial PA-C, Last Rate: 500 mg (08/04/20 9314)         Today, Patient Was Seen By: Dae Iglesias MD    ** Please Note: This note has been constructed using a voice recognition system   **

## 2020-08-04 NOTE — PROCEDURES
Speech Pathology Videofluoroscopic Swallow Study      Patient Name: Clarence Wesley    [id number] Date: 8/4/2020     Problem List  Principal Problem:    Toxic metabolic encephalopathy  Active Problems:    Cerebral palsy ([facility] )    Type 2 diabetes mellitus, without long-term current use of insulin (HCC)    Hypothyroidism    Hypernatremia    Acute cystitis without hematuria    Decreased appetite    Hypokalemia    Dysphagia      Past Medical History  Past Medical History:   Diagnosis Date    Adjustment disorder     Altered mental status 12/11/2015    Anemia     Bipolar 1 disorder ([facility] )     Cerebral palsy ([facility] )     Chronic hypernatremia 2/6/2016    Closed fracture of left hip ([facility] ) 1/19/2016    Closed left hip fracture (HCC)     no surgery    Constipation     Dehydration 2/20/2016    Diabetes mellitus ([facility] )     Disease of thyroid gland     Diverticulosis     Fracture of multiple ribs of right side     Hip fracture ([facility] ) 07/26/2015    left    Hyperlipidemia     Hypernatremia 12/28/2018    Hypotension     Impulse control disorder     Incontinence     Intellectual disability due to developmental disorder, unspecified     Microalbuminuria     Osteopathia     Osteoporosis     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HCC)     [facility]ocyt[facility] ) 7/31/2015       Past Surgical History  Past Surgical History:   Procedure Laterality Date    ABDOMINAL SURGERY      COLECTOMY MIN      re-anastomosis 7/22/16    COLONOSCOPY N/A 7/21/2016    Procedure: COLONOSCOPY;  Surgeon: Dexter Wright MD;  [facility]: [facility]; Service:     COLONOSCOPY N/A 1/31/2016    Procedure: COLONOSCOPY;  Surgeon: Dexter Wright MD;  [facility];  Service:     COLONOSCOPY N/A 7/25/2017    Procedure: COLONOSCOPY;  Surgeon: Dexter Wright MD;  [facility];   Service: Colorectal    COLOSTOMY      EXPLORATORY LAPAROTOMY W/ BOWEL RESECTION N/A 1/31/2016    Procedure: exploratory laparotomy, left sigmoidectomy, coloproctostomy, take down splenic flexure, loop colostomy;  Surgeon: Carlos Anderson MD;  [facility];  Service:     AR CLOSE ENTEROSTOMY N/A 7/22/2016    Procedure: SEGMENTAL COLECTOMY WITH COLOCOLOSTOMY;  Surgeon: Carlos Anderson MD;  [facility];  Service: Colorectal         General Information;  Rhiannon Goldman is a 64 y o  female group home resident  Her past & current medical issues are stated above  Current concerns for swallowing include significant decline in abilities (was on soft diet with thin liquid and now recommended for puree and thick liquid)  VBS was recommended to further assess oropharyngeal stage swallowing skills at this time  Pt was viewed in lateral position and was given trials of pureed food (pudding and applesauce) as well as honey (moderately thick) and nectar (mildly thick) liquid  Other materials were not administered given significant findings with conservative materials  Her caregiver Yvonne Richards was present and fed pt  Oral stage:  Pt presented with significant oral stage dysphagia  Bolus formation and transfer were impaired and characterized by slow and weak appearing tongue "pumping " with transfer/accumulation in valleculae and just beyond with puree and to pyriforms with thick liquid  Mild orel residue noted with thick liquids and nectar thick residue spilled after the primary transfer/swallow to pyriforms as well  Pharyngeal stage:  Pt presented with significant  pharyngeal dysphagia  Swallowing initiation was at least moderately delayed  Lesa Nanny Hyolaryngeal rise and anterior displacement were fair at best  Closure of the laryngeal inlet was delayed and incomplete  Tongue base retraction appeared to be of adequate strength  Management of food/liquid follows:   Spillage and delay resulted in inconsistent laryngeal penetration with pudding>applesauce and with consistent laryngeal penetration to level of vocal cords with honey thick liquid    Unfortunately, there was NO RESPONSE TO PENETRATION OR TO PASSIVE ASPIRATION OVER TIME TODAY  Pt accepted only 2 tsps of nectar thick liquid  Spillage to pyriform sinuses with high risk for aspiration before the swallow noted (then oral residue spilled with additional aspiration risk after primary swallow)  No aspiration occurred today and testing had to be discontinued 2* pt refusal (screaming)    Strategies and Efficacy: Neck flexion attempted but pt not maintaining positioning    Aspiration Response and Efficacy:  As stated above, NO RESPONSE TO REPEATED BOUTS OF PENETRATION OR TO ONGOING PASSIVE ASPIRATION OVER TIME TODAY  Esophageal stage:  Esophageal screening was completed  No stasis in upper esophagus (small amount of stasis of liquids in distal esophagus at end of testing  Assessment Summary:  Note: Images are available for review in PACS as desired  Pt presentED with significant oropharyngeal dysphagia characterized primarily by slow, weak tongue "pump" transfer, delayed swallowing initiation, incomplete airway protection and WITH NO RESPONSE TO PENETRATION OR TO PASSIVE ASPIRATION OVER TIME TODAY  NOTE:  After VBS (14:00-14:23),  I spoke mann Rock [facility] at length re: results and options  Chantale Phelan re: findings and possible plans  He suggested pt remain on pureed diet as she has "slowly been improving medically "  Lisa in agreement with same  I suggested pureed food with maximally thick liquids and educated staff re: same (same precautions posted)     Recommended Diet:  puree/level 1 diet and maximally thick liquids (and PEG if pt with medical sequalae)   Recommended Form of Medications: crushed with puree ideally  Aspiration precautions and compensatory swallowing strategies: upright posture, only feed when fully alert, slow rate of feeding and liquids by teaspoon only    SLP Dysphagia therapy recommended: yes, continue as inpatient    Vance Gant stated sthat she/her staff can prepare pureed diet and maximally thick liquids  She requested f/u VBS be scheduled (I suggested closue f/u of temps, BS etc and f/u VBS in 4-6 to assess (or sooner if pt with any medical sequalae    Dysphagia Goals per SLP:   1  pt will tolerate puree with pudding/maximally thick liquid without concern for medical indications of aspiration/pneumonia  2   Caregivers will appropriately prepare puree and thick liquids  3  Caregivers will identify changes in RR, BS  With or immediately following oral feedings

## 2020-08-05 ENCOUNTER — TELEPHONE (OUTPATIENT)
Dept: FAMILY MEDICINE CLINIC | Facility: CLINIC | Age: 60
End: 2020-08-05

## 2020-08-05 VITALS
SYSTOLIC BLOOD PRESSURE: 125 MMHG | RESPIRATION RATE: 16 BRPM | HEIGHT: 58 IN | TEMPERATURE: 98.4 F | WEIGHT: 92.59 LBS | HEART RATE: 83 BPM | OXYGEN SATURATION: 97 % | DIASTOLIC BLOOD PRESSURE: 85 MMHG | BODY MASS INDEX: 19.44 KG/M2

## 2020-08-05 LAB
ANION GAP SERPL CALCULATED.3IONS-SCNC: 5 MMOL/L (ref 4–13)
BUN SERPL-MCNC: 11 MG/DL (ref 5–25)
CALCIUM SERPL-MCNC: 8.8 MG/DL (ref 8.3–10.1)
CHLORIDE SERPL-SCNC: 109 MMOL/L (ref 100–108)
CO2 SERPL-SCNC: 29 MMOL/L (ref 21–32)
CREAT SERPL-MCNC: 0.87 MG/DL (ref 0.6–1.3)
GFR SERPL CREATININE-BSD FRML MDRD: 74 ML/MIN/1.73SQ M
GLUCOSE SERPL-MCNC: 171 MG/DL (ref 65–140)
GLUCOSE SERPL-MCNC: 189 MG/DL (ref 65–140)
MUSK AB SER IA-ACNC: <1 U/ML
POTASSIUM SERPL-SCNC: 4.4 MMOL/L (ref 3.5–5.3)
SARS-COV-2 RNA RESP QL NAA+PROBE: NEGATIVE
SODIUM SERPL-SCNC: 143 MMOL/L (ref 136–145)

## 2020-08-05 PROCEDURE — 87635 SARS-COV-2 COVID-19 AMP PRB: CPT | Performed by: INTERNAL MEDICINE

## 2020-08-05 PROCEDURE — 80048 BASIC METABOLIC PNL TOTAL CA: CPT | Performed by: INTERNAL MEDICINE

## 2020-08-05 PROCEDURE — 82948 REAGENT STRIP/BLOOD GLUCOSE: CPT

## 2020-08-05 PROCEDURE — 99239 HOSP IP/OBS DSCHRG MGMT >30: CPT | Performed by: INTERNAL MEDICINE

## 2020-08-05 RX ORDER — POLYETHYLENE GLYCOL 3350 17 G/17G
17 POWDER, FOR SOLUTION ORAL DAILY
Qty: 14 EACH | Refills: 0 | Status: CANCELLED | OUTPATIENT
Start: 2020-08-05

## 2020-08-05 RX ORDER — CORN STARCH
1 LIQUID (ML) ORAL
Qty: 200 EACH | Refills: 0 | Status: SHIPPED | OUTPATIENT
Start: 2020-08-05 | End: 2020-09-18

## 2020-08-05 RX ORDER — VALPROIC ACID 250 MG/5ML
500 SOLUTION ORAL 2 TIMES DAILY
Qty: 600 ML | Refills: 1 | Status: SHIPPED | OUTPATIENT
Start: 2020-08-05 | End: 2021-12-09

## 2020-08-05 RX ORDER — GUAIFENESIN 100 MG/5ML
200 SYRUP ORAL 3 TIMES DAILY PRN
Qty: 120 ML | Refills: 0 | Status: SHIPPED | OUTPATIENT
Start: 2020-08-05 | End: 2020-08-15

## 2020-08-05 RX ADMIN — BACLOFEN 10 MG: 10 TABLET ORAL at 08:56

## 2020-08-05 RX ADMIN — ENOXAPARIN SODIUM 30 MG: 30 INJECTION SUBCUTANEOUS at 08:55

## 2020-08-05 RX ADMIN — OXYBUTYNIN 5 MG: 5 TABLET, FILM COATED, EXTENDED RELEASE ORAL at 06:55

## 2020-08-05 RX ADMIN — FAMOTIDINE 20 MG: 20 TABLET, FILM COATED ORAL at 08:56

## 2020-08-05 RX ADMIN — LEVOTHYROXINE SODIUM 25 MCG: 25 TABLET ORAL at 06:55

## 2020-08-05 RX ADMIN — CITALOPRAM HYDROBROMIDE 40 MG: 20 TABLET ORAL at 08:56

## 2020-08-05 RX ADMIN — CHOLECALCIFEROL TAB 25 MCG (1000 UNIT) 2000 UNITS: 25 TAB at 08:57

## 2020-08-05 RX ADMIN — CALCIUM CARBONATE (ANTACID) CHEW TAB 500 MG 500 MG: 500 CHEW TAB at 08:56

## 2020-08-05 RX ADMIN — VALPROATE SODIUM 500 MG: 100 INJECTION, SOLUTION INTRAVENOUS at 09:02

## 2020-08-05 NOTE — SPEECH THERAPY NOTE
Records reviewed  D/C planned for noon    ALL recommendations re" diet, precautions and ongoing SLP treatment written on AVS

## 2020-08-05 NOTE — PLAN OF CARE
Problem: Potential for Falls  Goal: Patient will remain free of falls  Description: INTERVENTIONS:  - Assess patient frequently for physical needs  -  Identify cognitive and physical deficits and behaviors that affect risk of falls    -  [place] fall precautions as indicated by assessment   - Educate patient/family on patient safety including physical limitations  - Instruct patient to call for assistance with activity based on assessment  - Modify environment to reduce risk of injury  - Consider OT/PT consult to assist with strengthening/mobility  8/5/2020 1235 by Arti Romero RN  Outcome: Completed  8/5/2020 0742 by Arti Romero RN  Outcome: Progressing     Problem: Prexisting or High Potential for Compromised Skin Integrity  Goal: Skin integrity is maintained or improved  Description: INTERVENTIONS:  - Identify patients at risk for skin breakdown  - Assess and monitor skin integrity  - Assess and monitor nutrition and hydration status  - Monitor labs   - Assess for incontinence   - Turn and reposition patient  - Assist with mobility/ambulation  - Relieve pressure over bony prominences  - Avoid friction and shearing  - Provide appropriate hygiene as needed including keeping skin clean and dry  - Evaluate need for skin moisturizer/barrier cream  - Collaborate with interdisciplinary team   - Patient/family teaching  - Consider wound care consult   8/5/2020 1235 by Arti Romero RN  Outcome: Completed  8/5/2020 0742 by Arti Romero RN  Outcome: Progressing     Problem: NEUROSENSORY - ADULT  Goal: Achieves stable or improved neurological status  Description: INTERVENTIONS  - Monitor and report changes in neurological status  - Monitor vital signs such as temperature, blood pressure, glucose, and any other labs ordered   - Initiate measures to prevent increased intracranial pressure  - Monitor for seizure activity and implement precautions if appropriate      8/5/2020 1235 by Arti Romero RN  Outcome: Completed  8/5/2020 0742 by Navdeep Lo RN  Outcome: Progressing  Goal: Achieves maximal functionality and self care  Description: INTERVENTIONS  - Monitor swallowing and airway patency with patient fatigue and changes in neurological status  - Encourage and assist patient to increase activity and self care     - Encourage visually impaired, hearing impaired and aphasic patients to use assistive/communication devices  8/5/2020 1235 by Navdeep Lo RN  Outcome: Completed  8/5/2020 0742 by Navdeep Lo RN  Outcome: Progressing     Problem: PAIN - ADULT  Goal: Verbalizes/displays adequate comfort level or baseline comfort level  Description: Interventions:  - Encourage patient to monitor pain and request assistance  - Assess pain using appropriate pain scale  - Administer analgesics based on type and severity of pain and evaluate response  - Implement non-pharmacological measures as appropriate and evaluate response  - Consider cultural and social influences on pain and pain management  - Notify physician/advanced practitioner if interventions unsuccessful or patient reports new pain  8/5/2020 1235 by Navdeep Lo RN  Outcome: Completed  8/5/2020 0742 by Navdeep Lo RN  Outcome: Progressing     Problem: INFECTION - ADULT  Goal: Absence or prevention of progression during hospitalization  Description: INTERVENTIONS:  - Assess and monitor for signs and symptoms of infection  - Monitor lab/diagnostic results  - Monitor all insertion sites, i e  indwelling lines, tubes, and drains  - Monitor endotracheal if appropriate and nasal secretions for changes in amount and color  - [employer] appropriate cooling/warming therapies per order  - Administer medications as ordered  - Instruct and encourage patient and family to use good hand hygiene technique  - Identify and instruct in appropriate isolation precautions for identified infection/condition  8/5/2020 1235 by Navdeep Lo RN  Outcome: Completed  8/5/2020 9860 by Desmond Berman RN  Outcome: Progressing     Problem: SAFETY ADULT  Goal: Patient will remain free of falls  Description: INTERVENTIONS:  - Assess patient frequently for physical needs  -  Identify cognitive and physical deficits and behaviors that affect risk of falls    -  [place] fall precautions as indicated by assessment   - Educate patient/family on patient safety including physical limitations  - Instruct patient to call for assistance with activity based on assessment  - Modify environment to reduce risk of injury  - Consider OT/PT consult to assist with strengthening/mobility  8/5/2020 1235 by Desmond Berman RN  Outcome: Completed  8/5/2020 0742 by Desmond Berman RN  Outcome: Progressing  Goal: Maintain or return to baseline ADL function  Description: INTERVENTIONS:  -  Assess patient's ability to carry out ADLs; assess patient's baseline for ADL function and identify physical deficits which impact ability to perform ADLs (bathing, care of mouth/teeth, toileting, grooming, dressing, etc )  - Assess/evaluate cause of self-care deficits   - Assess range of motion  - Assess patient's mobility; develop plan if impaired  - Assess patient's need for assistive devices and provide as appropriate  - Encourage maximum independence but intervene and supervise when necessary  - Involve family in performance of ADLs  - Assess for home care needs following discharge   - Consider OT consult to assist with ADL evaluation and planning for discharge  - Provide patient education as appropriate  8/5/2020 1235 by Desmond Berman RN  Outcome: Completed  8/5/2020 0742 by Desmond Berman RN  Outcome: Progressing  Goal: Maintain or return mobility status to optimal level  Description: INTERVENTIONS:  - Assess patient's baseline mobility status (ambulation, transfers, stairs, etc )    - Identify cognitive and physical deficits and behaviors that affect mobility  - Identify mobility aids required to assist with transfers and/or ambulation (gait belt, sit-to-stand, lift, walker, cane, etc )  - [place] fall precautions as indicated by assessment  - Record patient progress and toleration of activity level on Mobility SBAR; progress patient to next Phase/Stage  - Instruct patient to call for assistance with activity based on assessment  - Consider rehabilitation consult to assist with strengthening/weightbearing, etc   8/5/2020 1235 by Adarsh Deutsch RN  Outcome: Completed  8/5/2020 0742 by Adarsh Deutsch RN  Outcome: Progressing     Problem: DISCHARGE PLANNING  Goal: Discharge to home or other facility with appropriate resources  Description: INTERVENTIONS:  - Identify barriers to discharge w/patient and caregiver  - Arrange for needed discharge resources and transportation as appropriate  - Identify discharge learning needs (meds, wound care, etc )  - Arrange for interpretive services to assist at discharge as needed  - Refer to Case Management Department for coordinating discharge planning if the patient needs post-hospital services based on physician/advanced practitioner order or complex needs related to functional status, cognitive ability, or social support system  8/5/2020 1235 by Adarsh Deutsch RN  Outcome: Completed  8/5/2020 0742 by Adarsh eDutsch RN  Outcome: Progressing     Problem: Knowledge Deficit  Goal: Patient/family/caregiver demonstrates understanding of disease process, treatment plan, medications, and discharge instructions  Description: Complete learning assessment and assess knowledge base    Interventions:  - Provide teaching at level of understanding  - Provide teaching via preferred learning methods  8/5/2020 1235 by Adarsh Deutsch RN  Outcome: Completed  8/5/2020 0742 by Adarsh Deutsch RN  Outcome: Progressing     Problem: Nutrition/Hydration-ADULT  Goal: Nutrient/Hydration intake appropriate for improving, restoring or maintaining nutritional needs  Description: Monitor and assess patient's nutrition/hydration status for malnutrition  Collaborate with interdisciplinary team and initiate plan and interventions as ordered  Monitor patient's weight and dietary intake as ordered or per policy  Utilize nutrition screening tool and intervene as necessary  Determine patient's food preferences and provide high-protein, high-caloric foods as appropriate       INTERVENTIONS:  - Monitor oral intake, urinary output, labs, and treatment plans  - Assess nutrition and hydration status and recommend course of action  - Evaluate amount of meals eaten  - Assist patient with eating if necessary   - Allow adequate time for meals  - Recommend/ encourage appropriate diets, oral nutritional supplements, and vitamin/mineral supplements  - Order, calculate, and assess calorie counts as needed  - Recommend, monitor, and adjust tube feedings and TPN/PPN based on assessed needs  - Assess need for intravenous fluids  - Provide specific nutrition/hydration education as appropriate  - Include patient/family/caregiver in decisions related to nutrition  8/5/2020 1235 by Suzi Lopes RN  Outcome: Completed  8/5/2020 0742 by Suzi Lopes RN  Outcome: Progressing

## 2020-08-05 NOTE — SOCIAL WORK
CM received call from Kevin Estrada with [facility]; they are unable to accept for ST services only as this cannot be a stand alone service  CM confirmed with Eddie Heredia they can provide ST as a stand alone service  CM contacted caregiver Swati Plascencia, she would like to utilize [employer] [place] [id number] services at this time  DCI faxed to [phone] and Eddie Heredia contact information emailed to Swati Plascencia at [email or web address]  CM confirmed with Jim Erazo at [facility] that patient will be signing onto service with them

## 2020-08-05 NOTE — SOCIAL WORK
CM contacted patient's caregiver, Jcarlos Adler, know that [facility] and [phone] can both accept for ST services only  Both provide [facility] on Monday  Lisa elects for ST services through [facility]  Both [facility] and [employer] updated and CM to fax DCI to [facility] once orders are signed  Jcarlos Adler will request "normal" DCI as well as facility AVS; APOLONIA William made aware

## 2020-08-05 NOTE — SOCIAL WORK
CM contacted patient's caregiver, James Gomez, to discuss discharge plan  James Gomez reports she plans to come around 12 pm to [facility] to transport patient home  CM discussed additional services that can be put into place to help support patient at home, considering her change in diet  James Gomez would like for  only; she reports there is 24/7 nursing care at the group home and this is the only additional need she anticipates for patient  James Gomez reports they normally utilize [facility] for [address] needs and would like for CM to send referral to them  [facility] a [facility], James Gomez would like for CM to send blanket referral to other local home health agencies and doesn't have a preference other than [facility]  Referrals sent via [employer]  CM confirmed that Rxs will be sent to [facility] in [place]; James Gomez plans to  the medications on her way to [facility]  SLIM aware and will escribe patient's medications  RN will also collect COVID test this am at group home's request  IMM reviewed with patient's caregiver  patient's caregiver agrees with discharge determination  CM to call James Gomez to follow up on [address] services

## 2020-08-05 NOTE — TELEPHONE ENCOUNTER
Patient's caregiver calling and stated, " Patient is on pudding thickened foods and liquids    Her microdantin 50 mg can not be opened and will need to be changed to either a liquid or a pill form that can be crushed "

## 2020-08-05 NOTE — PLAN OF CARE
Problem: Potential for Falls  Goal: Patient will remain free of falls  Description: INTERVENTIONS:  - Assess patient frequently for physical needs  -  Identify cognitive and physical deficits and behaviors that affect risk of falls    -  [place] fall precautions as indicated by assessment   - Educate patient/family on patient safety including physical limitations  - Instruct patient to call for assistance with activity based on assessment  - Modify environment to reduce risk of injury  - Consider OT/PT consult to assist with strengthening/mobility  Outcome: Progressing     Problem: Prexisting or High Potential for Compromised Skin Integrity  Goal: Skin integrity is maintained or improved  Description: INTERVENTIONS:  - Identify patients at risk for skin breakdown  - Assess and monitor skin integrity  - Assess and monitor nutrition and hydration status  - Monitor labs   - Assess for incontinence   - Turn and reposition patient  - Assist with mobility/ambulation  - Relieve pressure over bony prominences  - Avoid friction and shearing  - Provide appropriate hygiene as needed including keeping skin clean and dry  - Evaluate need for skin moisturizer/barrier cream  - Collaborate with interdisciplinary team   - Patient/family teaching  - Consider wound care consult   Outcome: Progressing     Problem: NEUROSENSORY - ADULT  Goal: Achieves stable or improved neurological status  Description: INTERVENTIONS  - Monitor and report changes in neurological status  - Monitor vital signs such as temperature, blood pressure, glucose, and any other labs ordered   - Initiate measures to prevent increased intracranial pressure  - Monitor for seizure activity and implement precautions if appropriate      Outcome: Progressing  Goal: Achieves maximal functionality and self care  Description: INTERVENTIONS  - Monitor swallowing and airway patency with patient fatigue and changes in neurological status  - Encourage and assist patient to increase activity and self care  - Encourage visually impaired, hearing impaired and aphasic patients to use assistive/communication devices  Outcome: Progressing     Problem: PAIN - ADULT  Goal: Verbalizes/displays adequate comfort level or baseline comfort level  Description: Interventions:  - Encourage patient to monitor pain and request assistance  - Assess pain using appropriate pain scale  - Administer analgesics based on type and severity of pain and evaluate response  - Implement non-pharmacological measures as appropriate and evaluate response  - Consider cultural and social influences on pain and pain management  - Notify physician/advanced practitioner if interventions unsuccessful or patient reports new pain  Outcome: Progressing     Problem: INFECTION - ADULT  Goal: Absence or prevention of progression during hospitalization  Description: INTERVENTIONS:  - Assess and monitor for signs and symptoms of infection  - Monitor lab/diagnostic results  - Monitor all insertion sites, i e  indwelling lines, tubes, and drains  - Monitor endotracheal if appropriate and nasal secretions for changes in amount and color  - [place] appropriate cooling/warming therapies per order  - Administer medications as ordered  - Instruct and encourage patient and family to use good hand hygiene technique  - Identify and instruct in appropriate isolation precautions for identified infection/condition  Outcome: Progressing     Problem: SAFETY ADULT  Goal: Patient will remain free of falls  Description: INTERVENTIONS:  - Assess patient frequently for physical needs  -  Identify cognitive and physical deficits and behaviors that affect risk of falls    -  [place] fall precautions as indicated by assessment   - Educate patient/family on patient safety including physical limitations  - Instruct patient to call for assistance with activity based on assessment  - Modify environment to reduce risk of injury  - Consider OT/PT consult to assist with strengthening/mobility  Outcome: Progressing  Goal: Maintain or return to baseline ADL function  Description: INTERVENTIONS:  -  Assess patient's ability to carry out ADLs; assess patient's baseline for ADL function and identify physical deficits which impact ability to perform ADLs (bathing, care of mouth/teeth, toileting, grooming, dressing, etc )  - Assess/evaluate cause of self-care deficits   - Assess range of motion  - Assess patient's mobility; develop plan if impaired  - Assess patient's need for assistive devices and provide as appropriate  - Encourage maximum independence but intervene and supervise when necessary  - Involve family in performance of ADLs  - Assess for home care needs following discharge   - Consider OT consult to assist with ADL evaluation and planning for discharge  - Provide patient education as appropriate  Outcome: Progressing  Goal: Maintain or return mobility status to optimal level  Description: INTERVENTIONS:  - Assess patient's baseline mobility status (ambulation, transfers, stairs, etc )    - Identify cognitive and physical deficits and behaviors that affect mobility  - Identify mobility aids required to assist with transfers and/or ambulation (gait belt, sit-to-stand, lift, walker, cane, etc )  - [place] fall precautions as indicated by assessment  - Record patient progress and toleration of activity level on Mobility SBAR; progress patient to next Phase/Stage  - Instruct patient to call for assistance with activity based on assessment  - Consider rehabilitation consult to assist with strengthening/weightbearing, etc   Outcome: Progressing     Problem: DISCHARGE PLANNING  Goal: Discharge to home or other facility with appropriate resources  Description: INTERVENTIONS:  - Identify barriers to discharge w/patient and caregiver  - Arrange for needed discharge resources and transportation as appropriate  - Identify discharge learning needs (meds, wound care, etc )  - Arrange for interpretive services to assist at discharge as needed  - Refer to Case Management Department for coordinating discharge planning if the patient needs post-hospital services based on physician/advanced practitioner order or complex needs related to functional status, cognitive ability, or social support system  Outcome: Progressing     Problem: Knowledge Deficit  Goal: Patient/family/caregiver demonstrates understanding of disease process, treatment plan, medications, and discharge instructions  Description: Complete learning assessment and assess knowledge base  Interventions:  - Provide teaching at level of understanding  - Provide teaching via preferred learning methods  Outcome: Progressing     Problem: Nutrition/Hydration-ADULT  Goal: Nutrient/Hydration intake appropriate for improving, restoring or maintaining nutritional needs  Description: Monitor and assess patient's nutrition/hydration status for malnutrition  Collaborate with interdisciplinary team and initiate plan and interventions as ordered  Monitor patient's weight and dietary intake as ordered or per policy  Utilize nutrition screening tool and intervene as necessary  Determine patient's food preferences and provide high-protein, high-caloric foods as appropriate       INTERVENTIONS:  - Monitor oral intake, urinary output, labs, and treatment plans  - Assess nutrition and hydration status and recommend course of action  - Evaluate amount of meals eaten  - Assist patient with eating if necessary   - Allow adequate time for meals  - Recommend/ encourage appropriate diets, oral nutritional supplements, and vitamin/mineral supplements  - Order, calculate, and assess calorie counts as needed  - Recommend, monitor, and adjust tube feedings and TPN/PPN based on assessed needs  - Assess need for intravenous fluids  - Provide specific nutrition/hydration education as appropriate  - Include patient/family/caregiver in decisions related to nutrition  Outcome: Progressing

## 2020-08-06 ENCOUNTER — TRANSITIONAL CARE MANAGEMENT (OUTPATIENT)
Dept: FAMILY MEDICINE CLINIC | Facility: CLINIC | Age: 60
End: 2020-08-06

## 2020-08-06 DIAGNOSIS — Z87.440 HISTORY OF RECURRENT CYSTITIS: Primary | ICD-10-CM

## 2020-08-06 DIAGNOSIS — Z86.39 HISTORY OF VITAMIN D DEFICIENCY: Primary | ICD-10-CM

## 2020-08-06 RX ORDER — NITROFURANTOIN MACROCRYSTALS 50 MG/1
50 CAPSULE ORAL
Qty: 30 CAPSULE | Refills: 0 | Status: ON HOLD | OUTPATIENT
Start: 2020-08-06 | End: 2020-09-08

## 2020-08-06 RX ORDER — CHOLECALCIFEROL (VITAMIN D3) 50 MCG
2000 TABLET ORAL DAILY
Qty: 30 TABLET | Refills: 5 | Status: ON HOLD | OUTPATIENT
Start: 2020-08-06 | End: 2022-03-09

## 2020-08-06 NOTE — TELEPHONE ENCOUNTER
I looked through her record but I do not see the medication mentioned in her medication list nor do I see it as a new medication listed on her AVS from discharge from the hospital today  From the discharge summary she was not sent home with antibiotics

## 2020-08-06 NOTE — TELEPHONE ENCOUNTER
I can prescribe the Macrodantin 50mg q daily for one month but if she has not been evaluated by Urology since 2016 I recommend that the patient be evaluated by Urology for assess need for continued daily prophylaxis  I will send the prescription to their pharmacy  Thanks!

## 2020-08-06 NOTE — TELEPHONE ENCOUNTER
Spoke with Lisa,caretaker  Patient has been on Macrodantin since 2016, originally prescribed by urology  She states that patient has history of Klebsiella varicola with colonies, since being on this, they have been able to manage UTI symptoms

## 2020-08-07 LAB — ACHR MOD AB/ACHR TOTAL SFR SER: <12 % (ref 0–20)

## 2020-08-11 ENCOUNTER — TELEPHONE (OUTPATIENT)
Dept: GASTROENTEROLOGY | Facility: AMBULARY SURGERY CENTER | Age: 60
End: 2020-08-11

## 2020-08-11 NOTE — TELEPHONE ENCOUNTER
Ok great! Thanks [place]! Please let me know if there are any other changes that need to be made

## 2020-08-11 NOTE — TELEPHONE ENCOUNTER
Galo Santoyo called again, she did confirm with their pharmacy, capsule can be opened and used  No need to change

## 2020-08-11 NOTE — TELEPHONE ENCOUNTER
Lisa called, the Macrodantin only comes in capsule or liquid, liquid costs $3,000 00 monthly  Are you able to change to Macrobid tablets?

## 2020-08-12 DIAGNOSIS — Z11.59 SCREENING FOR VIRAL DISEASE: ICD-10-CM

## 2020-08-12 PROCEDURE — U0003 INFECTIOUS AGENT DETECTION BY NUCLEIC ACID (DNA OR RNA); SEVERE ACUTE RESPIRATORY SYNDROME CORONAVIRUS 2 (SARS-COV-2) (CORONAVIRUS DISEASE [COVID-19]), AMPLIFIED PROBE TECHNIQUE, MAKING USE OF HIGH THROUGHPUT TECHNOLOGIES AS DESCRIBED BY CMS-2020-01-R: HCPCS | Performed by: FAMILY MEDICINE

## 2020-08-14 ENCOUNTER — OFFICE VISIT (OUTPATIENT)
Dept: FAMILY MEDICINE CLINIC | Facility: CLINIC | Age: 60
End: 2020-08-14

## 2020-08-14 VITALS — DIASTOLIC BLOOD PRESSURE: 60 MMHG | HEART RATE: 83 BPM | TEMPERATURE: 97.6 F | SYSTOLIC BLOOD PRESSURE: 100 MMHG

## 2020-08-14 DIAGNOSIS — R26.9 GAIT ABNORMALITY: ICD-10-CM

## 2020-08-14 DIAGNOSIS — R13.10 DYSPHAGIA, UNSPECIFIED TYPE: ICD-10-CM

## 2020-08-14 DIAGNOSIS — R26.2 AMBULATORY DYSFUNCTION: ICD-10-CM

## 2020-08-14 DIAGNOSIS — G80.0 SPASTIC QUADRIPLEGIC CEREBRAL PALSY (HCC): Primary | ICD-10-CM

## 2020-08-14 DIAGNOSIS — G92.8 TOXIC METABOLIC ENCEPHALOPATHY: ICD-10-CM

## 2020-08-14 DIAGNOSIS — N30.00 ACUTE CYSTITIS WITHOUT HEMATURIA: ICD-10-CM

## 2020-08-14 DIAGNOSIS — G63 POLYNEUROPATHY ASSOCIATED WITH UNDERLYING DISEASE (HCC): ICD-10-CM

## 2020-08-14 DIAGNOSIS — M21.371 FOOT DROP, BILATERAL: ICD-10-CM

## 2020-08-14 DIAGNOSIS — Z13.5 SCREENING FOR DIABETIC RETINOPATHY: ICD-10-CM

## 2020-08-14 DIAGNOSIS — F31.9 BIPOLAR AFFECTIVE DISORDER, REMISSION STATUS UNSPECIFIED (HCC): ICD-10-CM

## 2020-08-14 DIAGNOSIS — R32 URINARY INCONTINENCE, UNSPECIFIED TYPE: ICD-10-CM

## 2020-08-14 DIAGNOSIS — R53.81 PHYSICAL DECONDITIONING: ICD-10-CM

## 2020-08-14 DIAGNOSIS — M21.372 FOOT DROP, BILATERAL: ICD-10-CM

## 2020-08-14 DIAGNOSIS — M81.0 OSTEOPOROSIS, UNSPECIFIED OSTEOPOROSIS TYPE, UNSPECIFIED PATHOLOGICAL FRACTURE PRESENCE: ICD-10-CM

## 2020-08-14 LAB — SARS-COV-2 RNA SPEC QL NAA+PROBE: NOT DETECTED

## 2020-08-14 PROCEDURE — 99495 TRANSJ CARE MGMT MOD F2F 14D: CPT | Performed by: FAMILY MEDICINE

## 2020-08-14 NOTE — LETTER
August 14, 2020     Attn: Aleshia Culver [place]    Patient: Oumou Webster   YOB: 1960   Date of Visit: 8/14/2020       Dear Aleshia Culver: It was great seeing you and meeting Kendrick Olson today  Below are my notes for this visit  If you have questions, please do not hesitate to call me         Sincerely,        Benjie Waite DO        CC: No Recipients  Benjie Waite DO  8/14/2020  9:56 PM  Sign when Signing Visit    Transition of Care Management Visit  Oumou Webster 64 y o  female   MRN: [id number]    Assessment/Plan: Oumou Webster is a 64 y o  female with:   Acute cystitis without hematuria  Improved since recent hospital admission   -Urine culture showed positive for Enterobacter resistent to Amoxicillin, Ampicillin, and Cefazolin   -Finished course of IV Rocephin during hospital admission and not discharged on any further antibiotics  -Hx of colonizer with Klebsiella and per Urology evaluation in 2016 was recommended to be continued on daily Macrodantin 50mg q daily capsule     Dysphagia  -Worsening dysphagia during recent admission with evaluation by GI and Speech Therapy  -VBS performed on 08/04/20 showed "significant oropharyngeal dysphagia characterized primarily by slow, weak tongue "pump" transfer, delayed swallowing initiation, incomplete airway protection and WITH NO RESPONSE TO PENETRATION OR TO PASSIVE ASPIRATION OVER TIME TODAY"  -Per recommendations from Speech therapy, patient is to continue with pureed diet and pudding thick liquid  -Speech therapy to work with patient once a week as outpatient  -Referral made for repeat barium swallow exam in one month    Toxic metabolic encephalopathy  Improved since discharge from hospital  -Likely due to UTI that was resistant to outpatient therapy leading to inpatient IV antibiotics  -concern for possible seizure like activity during admission as patient found to be difficult to arouse with drooling from the mouth on July 31st; patient status quickly improved following rapid response with no immediate intervention  -Neurology consulted and EEG and MRI performed which was unremarkable  -patient also found to have Trazodone which is a PTA medication was possibly given during admission at 6am daily versus at 8pm nightly leading to patient's increased fatigue and difficulty to arouse during admission    Physical deconditioning  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  -Over the course of the last 5 years she has had a decrease in mobility due to cerebral palsy in which patient was previously able to ambulate on her own but has now progressed to  wheelchair dependency  After recent hospitalization, patient's ability to ambulate has declined and only able to walk short distances even with the assistance of staff members  -She has also had increased urinary incontinence causing patient to have frequent urinary accidents while in her wheelchair and while in her bed  -Also found to have continued weakness and decrease energy since recent admission however has been improving since yesterday  -Discussed the option of physical therapy however facility staff will continue to work with patient daily to help build her strength and upon follow up in one month will revisit the option of referral for outpatient physical therapy    -Due to increased medical assistance and progressive decline in strength and mobility, patient will need a hospital bed at the [facility] facility  Hospital bed will need full side rails and will need to be fully electric    -The hospital bed will also enable the patient to be fully upright during feeding to decrease risk of aspiration as patient continues to have dysphagia  Subjective:  Syed Sneed is a 64 y o  female here for Transition Care Management Visit       Hospital course summary:  -Admitted: 07/26/20  -Discharged: 08/05/20    HPI:  Patient is a 64year old female with history of Cerebral Palsy, Type 2DM, and Hypothyroidism, who presents today for hospital follow up  She was recently admitted to [place] [address]  from 07/26/20 to 08/05/20 after presenting to the ED with AMS secondary to unresolved UTI  Prior to admission, patient was treated for UTI as an outpatient on Keflex, however a few days after starting the medication the patient was found to have decreased energy and decreased appetite  She was brought to the ED from her group home [facility] for further evaluation  Upon evaluation in the ED, the patient was found to meet partial sepsis criteria with tachycardia, and AMS, but she did not have elevated WBC, fever, or tachypnea  She had elevated lactic acid at 4 1 that improved to 2 3 then 1 5 after given 2L normal saline in the ED  UA and Urine culture showed positive for Klebsiella and Enterobacter that was resistant to Keflex and was then started on IV Rocephin  After responding well to Rocephin she was transitioned to PO Omnicef  During her admission a rapid response was called on 07/31 after she was found to be drooling from the side of her mouth and was arousable only to deep sternal rub  No interventions were done as she improved quickly upon exam of the providers  Neurology was consulted for further evaluation and they were concerned for possible seizure activity due to high risk factors such as active infection and her Depakote for Bipolar Disorder was held since admission  EEG and MRI were ordered and there was no epileptiform activity seen on EEG and MRI was unremarkable  However it was noted per Arletta Feast that her Trazodone was given in the morning versus at 8pm which she takes at home  Patient was also evaluated by Speech Therapy due to concern of dysphagia   Video Barium Swallow test was performed which showed the patient does not have a good cough reflex and is a high risk for aspiration  Per recommendation from Speech, patient is to be discharged on pureed and pudding thick liquid and to follow up with speech outpatient in one month after discharge  Patient completed her course of antibiotics for her UTI and was not discharged home on further antibiotics  On August 5th, patient's encephalopathy had resolved, finished her course of antibiotics, and was safely discharged back home to her group home  Patient is seen in office today for hospital follow up  She is accompanied to the visit by her caregiver Debbie Pozo  Since discharge patient is still not at her baseline but has been more improved in energy level since yesterday  After her recent hospitalization, she is continuing to have dysphagia but has been evaluated by speech therapy who evaluated the patient at the group home and recommend therapy once a week  She is continued on pureed diet and pudding thick liquid  Also the patient is not able to ambulate as far as she used to  She is able to ambulate only about 5-6 feet with assistance until Debbie Pozo or other staff members need additional help as patient is too weak to continue  Due to increased fatigue, the facility has been holding her Ativan  Patient has also been found to have periods of hiccups and gagging at times during feeding  She requires to have head of bed elevated during eating to prevent aspiration  She is able to hold her food and feed herself since discharge  Since change in diet to pureed, patient has been having looser stools with up to 3 episodes a day  In addition, patient has been noted to have increased frequency since discharge  Patient is found to wet herself multiple times since discharge while in her wheel chair or on her bed which is new compared to prior to admission  Review of Systems   Constitutional: Positive for activity change (decreased) and fatigue  Negative for fever  HENT: Positive for trouble swallowing   Negative for congestion and rhinorrhea  Respiratory: Negative for cough and shortness of breath  Cardiovascular: Negative for leg swelling  Gastrointestinal: Negative for nausea and vomiting  Genitourinary: Positive for frequency  Negative for hematuria  Increased urination and incontinence   Musculoskeletal: Positive for gait problem  Skin: Negative for rash and wound  Neurological: Positive for weakness  Objective:  /60   Pulse 83   Temp 97 6 °F (36 4 °C)   LMP 11/29/2009 (Exact Date)   Physical Exam  Vitals signs reviewed  Constitutional:       General: She is not in acute distress  HENT:      Head: Normocephalic  Eyes:      Extraocular Movements: Extraocular movements intact  Neck:      Musculoskeletal: Neck supple  Cardiovascular:      Rate and Rhythm: Normal rate and regular rhythm  Heart sounds: Normal heart sounds  Pulmonary:      Effort: Pulmonary effort is normal       Breath sounds: Normal breath sounds  No wheezing, rhonchi or rales  Abdominal:      General: Bowel sounds are normal  There is no distension  Palpations: Abdomen is soft  Skin:     General: Skin is warm and dry  Neurological:      Mental Status: She is alert  Transitional Care Management Review:  During the TCM phone call patient stated:  TCM Call (since 7/14/2020)     Date and time call was made  8/6/2020  [address] [place] [address] reviewed  Records reviewed    Patient was hospitialized at  [address] [place] [address]    Date of Admission  07/26/20    Date of discharge  08/05/20    Diagnosis  Urinary tract infection, severe sepsis, Altered mental status, cerebral palsy, hypothyroidism, Type 2 Diabetes Mellitus    Disposition  Home    Were the patients medications reviewed and updated  Yes    Current Symptoms  Fatigue    Fatigue severity  Mild      TCM Call (since 7/14/2020)     Post hospital issues  Reduced activity    Should patient be enrolled in anticoag monitoring? No    Scheduled for follow up?   Yes Did you obtain your prescribed medications  Yes    Do you need help managing your prescriptions or medications  No    Is transportation to your appointment needed  No    I have advised the patient to call PCP with any new or worsening symptoms  [id number] staff    The type of support provided  Emotional; Physical    Are you recieving any outpatient services  No    Are you recieving home care services  No    Are you using any community resources  No    Current waiver services  No    Have you fallen in the last 12 months  No    Interperter language line needed  No    Counseling  Caregiver    Counseling topics  Diagnostic results; instructions for management; Activities of daily living          Need for Follow-up:   -Will need follow up appointment with Neurology as had to be rescheduled in July  -Check CMP and CK due to psych medications  -Follow up in one month to reassess progress since discharge    Changed/New Medications: based on hospital DS        Jean Holliday DO PGY-2  [facility]       Note: Portions of the record may have been created with voice recognition software  Occasional wrong word or "sound a like" substitutions may have occurred due to the inherent limitations of voice recognition software  Read the chart carefully and recognize, using context, where substitutions have occurred

## 2020-08-14 NOTE — PROGRESS NOTES
Transition of Care Management Visit  Madyson Velazco 64 y o  female   MRN: [id number]    Assessment/Plan: Madyson Velazco is a 64 y o  female with:   Acute cystitis without hematuria  Improved since recent hospital admission   -Urine culture showed positive for Enterobacter resistent to Amoxicillin, Ampicillin, and Cefazolin   -Finished course of IV Rocephin during hospital admission and not discharged on any further antibiotics  -Hx of colonizer with Klebsiella and per Urology evaluation in 2016 was recommended to be continued on daily Macrodantin 50mg q daily capsule     Dysphagia  -Worsening dysphagia during recent admission with evaluation by GI and Speech Therapy  -VBS performed on 08/04/20 showed "significant oropharyngeal dysphagia characterized primarily by slow, weak tongue "pump" transfer, delayed swallowing initiation, incomplete airway protection and WITH NO RESPONSE TO PENETRATION OR TO PASSIVE ASPIRATION OVER TIME TODAY"  -Per recommendations from Speech therapy, patient is to continue with pureed diet and pudding thick liquid  -Speech therapy to work with patient once a week as outpatient  -Referral made for repeat barium swallow exam in one month    Toxic metabolic encephalopathy  Improved since discharge from hospital  -Likely due to UTI that was resistant to outpatient therapy leading to inpatient IV antibiotics  -concern for possible seizure like activity during admission as patient found to be difficult to arouse with drooling from the mouth on July 31st; patient status quickly improved following rapid response with no immediate intervention  -Neurology consulted and EEG and MRI performed which was unremarkable  -patient also found to have Trazodone which is a PTA medication was possibly given during admission at 6am daily versus at 8pm nightly leading to patient's increased fatigue and difficulty to arouse during admission    Physical deconditioning  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  -Over the course of the last 5 years she has had a decrease in mobility due to cerebral palsy in which patient was previously able to ambulate on her own but has now progressed to  wheelchair dependency  After recent hospitalization, patient's ability to ambulate has declined and only able to walk short distances even with the assistance of staff members  -She has also had increased urinary incontinence causing patient to have frequent urinary accidents while in her wheelchair and while in her bed  -Also found to have continued weakness and decrease energy since recent admission however has been improving since yesterday  -Discussed the option of physical therapy however facility staff will continue to work with patient daily to help build her strength and upon follow up in one month will revisit the option of referral for outpatient physical therapy    -Due to increased medical assistance and progressive decline in strength and mobility, patient will need a hospital bed at the [facility] facility  Hospital bed will need full side rails and will need to be fully electric    -The hospital bed will also enable the patient to be fully upright during feeding to decrease risk of aspiration as patient continues to have dysphagia  Subjective:  Erika French is a 64 y o  female here for Transition Care Management Visit  Hospital course summary:  -Admitted: 07/26/20  -Discharged: 08/05/20    HPI:  Patient is a 64year old female with history of Cerebral Palsy, Type 2DM, and Hypothyroidism, who presents today for hospital follow up  She was recently admitted to [place] [address]  from 07/26/20 to 08/05/20 after presenting to the ED with AMS secondary to unresolved UTI   Prior to admission, patient was treated for UTI as an outpatient on Keflex, however a few days after starting the medication the patient was found to have decreased energy and decreased appetite  She was brought to the ED from her group home [facility] for further evaluation  Upon evaluation in the ED, the patient was found to meet partial sepsis criteria with tachycardia, and AMS, but she did not have elevated WBC, fever, or tachypnea  She had elevated lactic acid at 4 1 that improved to 2 3 then 1 5 after given 2L normal saline in the ED  UA and Urine culture showed positive for Klebsiella and Enterobacter that was resistant to Keflex and was then started on IV Rocephin  After responding well to Rocephin she was transitioned to PO Omnicef  During her admission a rapid response was called on 07/31 after she was found to be drooling from the side of her mouth and was arousable only to deep sternal rub  No interventions were done as she improved quickly upon exam of the providers  Neurology was consulted for further evaluation and they were concerned for possible seizure activity due to high risk factors such as active infection and her Depakote for Bipolar Disorder was held since admission  EEG and MRI were ordered and there was no epileptiform activity seen on EEG and MRI was unremarkable  However it was noted per Laura Shook that her Trazodone was given in the morning versus at 8pm which she takes at home  Patient was also evaluated by Speech Therapy due to concern of dysphagia  Video Barium Swallow test was performed which showed the patient does not have a good cough reflex and is a high risk for aspiration  Per recommendation from Speech, patient is to be discharged on pureed and pudding thick liquid and to follow up with speech outpatient in one month after discharge  Patient completed her course of antibiotics for her UTI and was not discharged home on further antibiotics  On August 5th, patient's encephalopathy had resolved, finished her course of antibiotics, and was safely discharged back home to her group home  Patient is seen in office today for hospital follow up  She is accompanied to the visit by her caregiver Haile Lewiscinthia  Since discharge patient is still not at her baseline but has been more improved in energy level since yesterday  After her recent hospitalization, she is continuing to have dysphagia but has been evaluated by speech therapy who evaluated the patient at the group home and recommend therapy once a week  She is continued on pureed diet and pudding thick liquid  Also the patient is not able to ambulate as far as she used to  She is able to ambulate only about 5-6 feet with assistance until Haile Lopes or other staff members need additional help as patient is too weak to continue  Due to increased fatigue, the facility has been holding her Ativan  Patient has also been found to have periods of hiccups and gagging at times during feeding  She requires to have head of bed elevated during eating to prevent aspiration  She is able to hold her food and feed herself since discharge  Since change in diet to pureed, patient has been having looser stools with up to 3 episodes a day  In addition, patient has been noted to have increased frequency since discharge  Patient is found to wet herself multiple times since discharge while in her wheel chair or on her bed which is new compared to prior to admission  Review of Systems   Constitutional: Positive for activity change (decreased) and fatigue  Negative for fever  HENT: Positive for trouble swallowing  Negative for congestion and rhinorrhea  Respiratory: Negative for cough and shortness of breath  Cardiovascular: Negative for leg swelling  Gastrointestinal: Negative for nausea and vomiting  Genitourinary: Positive for frequency  Negative for hematuria  Increased urination and incontinence   Musculoskeletal: Positive for gait problem  Skin: Negative for rash and wound  Neurological: Positive for weakness       Objective:  /60 Pulse 83   Temp 97 6 °F (36 4 °C)   LMP 11/29/2009 (Exact Date)   Physical Exam  Vitals signs reviewed  Constitutional:       General: She is not in acute distress  HENT:      Head: Normocephalic  Eyes:      Extraocular Movements: Extraocular movements intact  Neck:      Musculoskeletal: Neck supple  Cardiovascular:      Rate and Rhythm: Normal rate and regular rhythm  Heart sounds: Normal heart sounds  Pulmonary:      Effort: Pulmonary effort is normal       Breath sounds: Normal breath sounds  No wheezing, rhonchi or rales  Abdominal:      General: Bowel sounds are normal  There is no distension  Palpations: Abdomen is soft  Skin:     General: Skin is warm and dry  Neurological:      Mental Status: She is alert  Transitional Care Management Review:  During the TCM phone call patient stated:  TCM Call (since 7/14/2020)     Date and time call was made  8/6/2020  [address] [place] [address] reviewed  Records reviewed    Patient was hospitialized at  [address] [place] [address]    Date of Admission  07/26/20    Date of discharge  08/05/20    Diagnosis  Urinary tract infection, severe sepsis, Altered mental status, cerebral palsy, hypothyroidism, Type 2 Diabetes Mellitus    Disposition  Home    Were the patients medications reviewed and updated  Yes    Current Symptoms  Fatigue    Fatigue severity  Mild      TCM Call (since 7/14/2020)     Post hospital issues  Reduced activity    Should patient be enrolled in anticoag monitoring? No    Scheduled for follow up?   Yes    Did you obtain your prescribed medications  Yes    Do you need help managing your prescriptions or medications  No    Is transportation to your appointment needed  No    I have advised the patient to call PCP with any new or worsening symptoms  [id number] staff    The type of support provided  Emotional; Physical    Are you recieving any outpatient services  No    Are you recieving home care services  No    Are you using any community resources  No    Current waiver services  No    Have you fallen in the last 12 months  No    Interperter language line needed  No    Counseling  Caregiver    Counseling topics  Diagnostic results; instructions for management; Activities of daily living          Need for Follow-up:   -Will need follow up appointment with Neurology as had to be rescheduled in July  -Check CMP and CK due to psych medications  -Follow up in one month to reassess progress since discharge    Changed/New Medications: based on hospital DS        Joseph Singer DO PGY-2  [facility]       Note: Portions of the record may have been created with voice recognition software  Occasional wrong word or "sound a like" substitutions may have occurred due to the inherent limitations of voice recognition software  Read the chart carefully and recognize, using context, where substitutions have occurred

## 2020-08-15 NOTE — ASSESSMENT & PLAN NOTE
Improved since recent hospital admission   -Urine culture showed positive for Enterobacter resistent to Amoxicillin, Ampicillin, and Cefazolin   -Finished course of IV Rocephin during hospital admission and not discharged on any further antibiotics  -Hx of colonizer with Klebsiella and per Urology evaluation in 2016 was recommended to be continued on daily Macrodantin 50mg q daily capsule

## 2020-08-15 NOTE — ASSESSMENT & PLAN NOTE
Improved since discharge from hospital  -Likely due to UTI that was resistant to outpatient therapy leading to inpatient IV antibiotics  -concern for possible seizure like activity during admission as patient found to be difficult to arouse with drooling from the mouth on July 31st; patient status quickly improved following rapid response with no immediate intervention  -Neurology consulted and EEG and MRI performed which was unremarkable  -patient also found to have Trazodone which is a PTA medication was possibly given during admission at 6am daily versus at 8pm nightly leading to patient's increased fatigue and difficulty to arouse during admission

## 2020-08-15 NOTE — ASSESSMENT & PLAN NOTE
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  -Over the course of the last 5 years she has had a decrease in mobility due to cerebral palsy in which patient was previously able to ambulate on her own but has now progressed to  wheelchair dependency  After recent hospitalization, patient's ability to ambulate has declined and only able to walk short distances even with the assistance of staff members  -She has also had increased urinary incontinence causing patient to have frequent urinary accidents while in her wheelchair and while in her bed  -Also found to have continued weakness and decrease energy since recent admission however has been improving since yesterday  -Discussed the option of physical therapy however facility staff will continue to work with patient daily to help build her strength and upon follow up in one month will revisit the option of referral for outpatient physical therapy    -Due to increased medical assistance and progressive decline in strength and mobility, patient will need a hospital bed at the [facility] facility  Hospital bed will need full side rails and will need to be fully electric    -The hospital bed will also enable the patient to be fully upright during feeding to decrease risk of aspiration as patient continues to have dysphagia

## 2020-08-15 NOTE — ASSESSMENT & PLAN NOTE
-Worsening dysphagia during recent admission with evaluation by GI and Speech Therapy  -VBS performed on 08/04/20 showed "significant oropharyngeal dysphagia characterized primarily by slow, weak tongue "pump" transfer, delayed swallowing initiation, incomplete airway protection and WITH NO RESPONSE TO PENETRATION OR TO PASSIVE ASPIRATION OVER TIME TODAY"  -Per recommendations from Speech therapy, patient is to continue with pureed diet and pudding thick liquid  -Speech therapy to work with patient once a week as outpatient  -Referral made for repeat barium swallow exam in one month

## 2020-08-25 ENCOUNTER — TELEPHONE (OUTPATIENT)
Dept: FAMILY MEDICINE CLINIC | Facility: CLINIC | Age: 60
End: 2020-08-25

## 2020-08-25 DIAGNOSIS — Z87.440 HISTORY OF RECURRENT UTIS: ICD-10-CM

## 2020-08-25 DIAGNOSIS — R32 URINARY AND BOWEL INCONTINENCE: ICD-10-CM

## 2020-08-25 DIAGNOSIS — K21.9 GASTROESOPHAGEAL REFLUX DISEASE, ESOPHAGITIS PRESENCE NOT SPECIFIED: Primary | ICD-10-CM

## 2020-08-25 DIAGNOSIS — R15.9 URINARY AND BOWEL INCONTINENCE: ICD-10-CM

## 2020-08-25 DIAGNOSIS — R19.8 CHANGE IN BOWEL MOVEMENT: ICD-10-CM

## 2020-08-25 NOTE — TELEPHONE ENCOUNTER
Patient seems to be having GERD again  Asking for patient to be ordered Prilosec again, that had been d/c ed back in November

## 2020-08-26 DIAGNOSIS — K21.9 GASTROESOPHAGEAL REFLUX DISEASE, ESOPHAGITIS PRESENCE NOT SPECIFIED: ICD-10-CM

## 2020-08-26 RX ORDER — FAMOTIDINE 20 MG/1
20 TABLET, FILM COATED ORAL DAILY
Qty: 30 TABLET | Refills: 0 | Status: SHIPPED | OUTPATIENT
Start: 2020-08-26 | End: 2020-09-01

## 2020-08-28 ENCOUNTER — TELEPHONE (OUTPATIENT)
Dept: FAMILY MEDICINE CLINIC | Facility: CLINIC | Age: 60
End: 2020-08-28

## 2020-08-28 NOTE — TELEPHONE ENCOUNTER
James Gomez called from [facility] asking for letter of med necessity for the full electric bed because she has medicaid and it needs to state its for long term use   James Gomez can be reached at [phone] or [phone]

## 2020-08-31 RX ORDER — OMEPRAZOLE 20 MG/1
20 CAPSULE, DELAYED RELEASE ORAL DAILY
Qty: 30 CAPSULE | Refills: 1 | Status: SHIPPED | OUTPATIENT
Start: 2020-08-31 | End: 2020-09-30 | Stop reason: SDUPTHER

## 2020-08-31 NOTE — TELEPHONE ENCOUNTER
Lisa cota, patient has been on Pepcid, but her current symptoms are more GERD, she is on pudding thickness, has been gagging  She is scheduled to see GI on Wed  She also has had behavior changes in the past few days, her bowels have been watery, today bowel is soft and formed  In the past, some of these behaviors were related to UTI  Swati Plascencia wants to know if you would order Omeprazole as well as Urinalysis with culture  She wants to know since there has been change in bowel, do you want to order any stool studies?

## 2020-09-01 ENCOUNTER — HOSPITAL ENCOUNTER (INPATIENT)
Facility: HOSPITAL | Age: 60
LOS: 7 days | Discharge: HOME | DRG: 871 | End: 2020-09-08
Attending: EMERGENCY MEDICINE | Admitting: INTERNAL MEDICINE
Payer: COMMERCIAL

## 2020-09-01 ENCOUNTER — TELEPHONE (OUTPATIENT)
Dept: FAMILY MEDICINE CLINIC | Facility: CLINIC | Age: 60
End: 2020-09-01

## 2020-09-01 ENCOUNTER — APPOINTMENT (EMERGENCY)
Dept: CT IMAGING | Facility: HOSPITAL | Age: 60
DRG: 871 | End: 2020-09-01
Payer: COMMERCIAL

## 2020-09-01 ENCOUNTER — APPOINTMENT (EMERGENCY)
Dept: RADIOLOGY | Facility: HOSPITAL | Age: 60
DRG: 871 | End: 2020-09-01
Payer: COMMERCIAL

## 2020-09-01 DIAGNOSIS — N39.0 UTI (URINARY TRACT INFECTION): ICD-10-CM

## 2020-09-01 DIAGNOSIS — J18.1 CONSOLIDATION OF RIGHT LOWER LOBE OF LUNG (HCC): ICD-10-CM

## 2020-09-01 DIAGNOSIS — Z86.69 HISTORY OF CEREBRAL PALSY: ICD-10-CM

## 2020-09-01 DIAGNOSIS — R65.20 SEVERE SEPSIS (HCC): Primary | ICD-10-CM

## 2020-09-01 DIAGNOSIS — G80.9 CEREBRAL PALSY, UNSPECIFIED TYPE (HCC): ICD-10-CM

## 2020-09-01 DIAGNOSIS — R63.6 UNDERWEIGHT: ICD-10-CM

## 2020-09-01 DIAGNOSIS — A41.9 SEVERE SEPSIS (HCC): Primary | ICD-10-CM

## 2020-09-01 DIAGNOSIS — R13.10 DYSPHAGIA, UNSPECIFIED TYPE: ICD-10-CM

## 2020-09-01 DIAGNOSIS — D50.9 IRON DEFICIENCY ANEMIA, UNSPECIFIED IRON DEFICIENCY ANEMIA TYPE: ICD-10-CM

## 2020-09-01 DIAGNOSIS — Z87.440 HISTORY OF RECURRENT CYSTITIS: ICD-10-CM

## 2020-09-01 DIAGNOSIS — D72.10 EOSINOPHILIA: ICD-10-CM

## 2020-09-01 LAB
ALBUMIN SERPL BCP-MCNC: 2.1 G/DL (ref 3.5–5)
ALP SERPL-CCNC: 68 U/L (ref 46–116)
ALT SERPL W P-5'-P-CCNC: 12 U/L (ref 12–78)
ANION GAP SERPL CALCULATED.3IONS-SCNC: 7 MMOL/L (ref 4–13)
APTT PPP: 23 SECONDS (ref 23–37)
AST SERPL W P-5'-P-CCNC: 10 U/L (ref 5–45)
BACTERIA UR QL AUTO: ABNORMAL /HPF
BASOPHILS # BLD AUTO: 0.06 THOUSANDS/ΜL (ref 0–0.1)
BASOPHILS NFR BLD AUTO: 1 % (ref 0–1)
BILIRUB SERPL-MCNC: 0.1 MG/DL (ref 0.2–1)
BILIRUB UR QL STRIP: NEGATIVE
BUN SERPL-MCNC: 12 MG/DL (ref 5–25)
CALCIUM SERPL-MCNC: 8.9 MG/DL (ref 8.3–10.1)
CHLORIDE SERPL-SCNC: 101 MMOL/L (ref 100–108)
CLARITY UR: CLEAR
CO2 SERPL-SCNC: 31 MMOL/L (ref 21–32)
COLOR UR: YELLOW
CREAT SERPL-MCNC: 0.98 MG/DL (ref 0.6–1.3)
EOSINOPHIL # BLD AUTO: 1.26 THOUSAND/ΜL (ref 0–0.61)
EOSINOPHIL NFR BLD AUTO: 11 % (ref 0–6)
ERYTHROCYTE [DISTWIDTH] IN BLOOD BY AUTOMATED COUNT: 12.9 % (ref 11.6–15.1)
GFR SERPL CREATININE-BSD FRML MDRD: 64 ML/MIN/1.73SQ M
GLUCOSE SERPL-MCNC: 142 MG/DL (ref 65–140)
GLUCOSE SERPL-MCNC: 149 MG/DL (ref 65–140)
GLUCOSE SERPL-MCNC: 164 MG/DL (ref 65–140)
GLUCOSE SERPL-MCNC: 171 MG/DL (ref 65–140)
GLUCOSE UR STRIP-MCNC: NEGATIVE MG/DL
HCT VFR BLD AUTO: 33.9 % (ref 34.8–46.1)
HGB BLD-MCNC: 11.1 G/DL (ref 11.5–15.4)
HGB UR QL STRIP.AUTO: ABNORMAL
IMM GRANULOCYTES # BLD AUTO: 0.18 THOUSAND/UL (ref 0–0.2)
IMM GRANULOCYTES NFR BLD AUTO: 2 % (ref 0–2)
INR PPP: 0.98 (ref 0.84–1.19)
KETONES UR STRIP-MCNC: NEGATIVE MG/DL
LACTATE SERPL-SCNC: 1 MMOL/L (ref 0.5–2)
LACTATE SERPL-SCNC: 4.9 MMOL/L (ref 0.5–2)
LEUKOCYTE ESTERASE UR QL STRIP: ABNORMAL
LYMPHOCYTES # BLD AUTO: 1.87 THOUSANDS/ΜL (ref 0.6–4.47)
LYMPHOCYTES NFR BLD AUTO: 16 % (ref 14–44)
MAGNESIUM SERPL-MCNC: 1.6 MG/DL (ref 1.6–2.6)
MCH RBC QN AUTO: 35.5 PG (ref 26.8–34.3)
MCHC RBC AUTO-ENTMCNC: 32.7 G/DL (ref 31.4–37.4)
MCV RBC AUTO: 108 FL (ref 82–98)
MONOCYTES # BLD AUTO: 0.78 THOUSAND/ΜL (ref 0.17–1.22)
MONOCYTES NFR BLD AUTO: 7 % (ref 4–12)
NEUTROPHILS # BLD AUTO: 7.45 THOUSANDS/ΜL (ref 1.85–7.62)
NEUTS SEG NFR BLD AUTO: 63 % (ref 43–75)
NITRITE UR QL STRIP: POSITIVE
NON-SQ EPI CELLS URNS QL MICRO: ABNORMAL /HPF
NRBC BLD AUTO-RTO: 0 /100 WBCS
PH UR STRIP.AUTO: 7.5 [PH]
PLATELET # BLD AUTO: 184 THOUSANDS/UL (ref 149–390)
PMV BLD AUTO: 10.5 FL (ref 8.9–12.7)
POTASSIUM SERPL-SCNC: 4.1 MMOL/L (ref 3.5–5.3)
PROCALCITONIN SERPL-MCNC: 0.19 NG/ML
PROT SERPL-MCNC: 6.1 G/DL (ref 6.4–8.2)
PROT UR STRIP-MCNC: NEGATIVE MG/DL
PROTHROMBIN TIME: 13.1 SECONDS (ref 11.6–14.5)
RBC # BLD AUTO: 3.13 MILLION/UL (ref 3.81–5.12)
RBC #/AREA URNS AUTO: ABNORMAL /HPF
SODIUM SERPL-SCNC: 139 MMOL/L (ref 136–145)
SP GR UR STRIP.AUTO: 1.01 (ref 1–1.03)
UROBILINOGEN UR QL STRIP.AUTO: 0.2 E.U./DL
WBC # BLD AUTO: 11.6 THOUSAND/UL (ref 4.31–10.16)
WBC #/AREA URNS AUTO: ABNORMAL /HPF

## 2020-09-01 PROCEDURE — 83605 ASSAY OF LACTIC ACID: CPT | Performed by: PHYSICIAN ASSISTANT

## 2020-09-01 PROCEDURE — 80053 COMPREHEN METABOLIC PANEL: CPT | Performed by: PHYSICIAN ASSISTANT

## 2020-09-01 PROCEDURE — 85610 PROTHROMBIN TIME: CPT | Performed by: PHYSICIAN ASSISTANT

## 2020-09-01 PROCEDURE — 99223 1ST HOSP IP/OBS HIGH 75: CPT | Performed by: INTERNAL MEDICINE

## 2020-09-01 PROCEDURE — 83735 ASSAY OF MAGNESIUM: CPT | Performed by: EMERGENCY MEDICINE

## 2020-09-01 PROCEDURE — 96365 THER/PROPH/DIAG IV INF INIT: CPT

## 2020-09-01 PROCEDURE — 81001 URINALYSIS AUTO W/SCOPE: CPT | Performed by: PHYSICIAN ASSISTANT

## 2020-09-01 PROCEDURE — 96361 HYDRATE IV INFUSION ADD-ON: CPT

## 2020-09-01 PROCEDURE — 87186 SC STD MICRODIL/AGAR DIL: CPT | Performed by: PHYSICIAN ASSISTANT

## 2020-09-01 PROCEDURE — 93005 ELECTROCARDIOGRAM TRACING: CPT

## 2020-09-01 PROCEDURE — 71045 X-RAY EXAM CHEST 1 VIEW: CPT

## 2020-09-01 PROCEDURE — 36415 COLL VENOUS BLD VENIPUNCTURE: CPT | Performed by: PHYSICIAN ASSISTANT

## 2020-09-01 PROCEDURE — 87040 BLOOD CULTURE FOR BACTERIA: CPT | Performed by: PHYSICIAN ASSISTANT

## 2020-09-01 PROCEDURE — 87077 CULTURE AEROBIC IDENTIFY: CPT | Performed by: PHYSICIAN ASSISTANT

## 2020-09-01 PROCEDURE — 85730 THROMBOPLASTIN TIME PARTIAL: CPT | Performed by: PHYSICIAN ASSISTANT

## 2020-09-01 PROCEDURE — 99285 EMERGENCY DEPT VISIT HI MDM: CPT

## 2020-09-01 PROCEDURE — 82948 REAGENT STRIP/BLOOD GLUCOSE: CPT

## 2020-09-01 PROCEDURE — 87086 URINE CULTURE/COLONY COUNT: CPT | Performed by: PHYSICIAN ASSISTANT

## 2020-09-01 PROCEDURE — G1004 CDSM NDSC: HCPCS

## 2020-09-01 PROCEDURE — 84145 PROCALCITONIN (PCT): CPT | Performed by: PHYSICIAN ASSISTANT

## 2020-09-01 PROCEDURE — 99285 EMERGENCY DEPT VISIT HI MDM: CPT | Performed by: PHYSICIAN ASSISTANT

## 2020-09-01 PROCEDURE — 74177 CT ABD & PELVIS W/CONTRAST: CPT

## 2020-09-01 PROCEDURE — 85025 COMPLETE CBC W/AUTO DIFF WBC: CPT | Performed by: PHYSICIAN ASSISTANT

## 2020-09-01 RX ORDER — LORAZEPAM 0.5 MG/1
0.5 TABLET ORAL
Status: DISCONTINUED | OUTPATIENT
Start: 2020-09-01 | End: 2020-09-08 | Stop reason: HOSPADM

## 2020-09-01 RX ORDER — MELATONIN
2000 DAILY
Status: DISCONTINUED | OUTPATIENT
Start: 2020-09-02 | End: 2020-09-08 | Stop reason: HOSPADM

## 2020-09-01 RX ORDER — CITALOPRAM 20 MG/1
40 TABLET ORAL EVERY MORNING
Status: DISCONTINUED | OUTPATIENT
Start: 2020-09-02 | End: 2020-09-08 | Stop reason: HOSPADM

## 2020-09-01 RX ORDER — SODIUM CHLORIDE 9 MG/ML
75 INJECTION, SOLUTION INTRAVENOUS CONTINUOUS
Status: DISCONTINUED | OUTPATIENT
Start: 2020-09-01 | End: 2020-09-03

## 2020-09-01 RX ORDER — SODIUM CHLORIDE 9 MG/ML
3 INJECTION INTRAVENOUS
Status: DISCONTINUED | OUTPATIENT
Start: 2020-09-01 | End: 2020-09-08 | Stop reason: HOSPADM

## 2020-09-01 RX ORDER — ACETAMINOPHEN 325 MG/1
650 TABLET ORAL EVERY 6 HOURS PRN
Status: DISCONTINUED | OUTPATIENT
Start: 2020-09-01 | End: 2020-09-08 | Stop reason: HOSPADM

## 2020-09-01 RX ORDER — LEVOTHYROXINE SODIUM 0.03 MG/1
25 TABLET ORAL
Status: DISCONTINUED | OUTPATIENT
Start: 2020-09-02 | End: 2020-09-08 | Stop reason: HOSPADM

## 2020-09-01 RX ORDER — VALPROIC ACID 250 MG/5ML
500 SOLUTION ORAL 2 TIMES DAILY
Status: DISCONTINUED | OUTPATIENT
Start: 2020-09-01 | End: 2020-09-08 | Stop reason: HOSPADM

## 2020-09-01 RX ORDER — TRAZODONE HYDROCHLORIDE 100 MG/1
100 TABLET ORAL
Status: DISCONTINUED | OUTPATIENT
Start: 2020-09-01 | End: 2020-09-08 | Stop reason: HOSPADM

## 2020-09-01 RX ORDER — BACLOFEN 10 MG/1
10 TABLET ORAL 3 TIMES DAILY
Status: DISCONTINUED | OUTPATIENT
Start: 2020-09-01 | End: 2020-09-08 | Stop reason: HOSPADM

## 2020-09-01 RX ORDER — PANTOPRAZOLE SODIUM 20 MG/1
20 TABLET, DELAYED RELEASE ORAL
Status: DISCONTINUED | OUTPATIENT
Start: 2020-09-02 | End: 2020-09-08 | Stop reason: HOSPADM

## 2020-09-01 RX ORDER — PRAVASTATIN SODIUM 40 MG
40 TABLET ORAL
Status: DISCONTINUED | OUTPATIENT
Start: 2020-09-01 | End: 2020-09-08 | Stop reason: HOSPADM

## 2020-09-01 RX ORDER — FERROUS SULFATE 325(65) MG
325 TABLET ORAL 2 TIMES DAILY WITH MEALS
Status: DISCONTINUED | OUTPATIENT
Start: 2020-09-01 | End: 2020-09-08 | Stop reason: HOSPADM

## 2020-09-01 RX ORDER — B-COMPLEX WITH VITAMIN C
1 TABLET ORAL 2 TIMES DAILY WITH MEALS
Status: DISCONTINUED | OUTPATIENT
Start: 2020-09-01 | End: 2020-09-08 | Stop reason: HOSPADM

## 2020-09-01 RX ORDER — ONDANSETRON 2 MG/ML
4 INJECTION INTRAMUSCULAR; INTRAVENOUS EVERY 6 HOURS PRN
Status: DISCONTINUED | OUTPATIENT
Start: 2020-09-01 | End: 2020-09-08 | Stop reason: HOSPADM

## 2020-09-01 RX ORDER — OXYBUTYNIN CHLORIDE 5 MG/1
5 TABLET, EXTENDED RELEASE ORAL 3 TIMES DAILY
Status: DISCONTINUED | OUTPATIENT
Start: 2020-09-01 | End: 2020-09-05 | Stop reason: SDDI

## 2020-09-01 RX ORDER — AMMONIUM LACTATE 12 G/100G
CREAM TOPICAL 2 TIMES DAILY PRN
Status: DISCONTINUED | OUTPATIENT
Start: 2020-09-01 | End: 2020-09-08 | Stop reason: HOSPADM

## 2020-09-01 RX ORDER — ARIPIPRAZOLE 10 MG/1
30 TABLET ORAL EVERY 24 HOURS
Status: DISCONTINUED | OUTPATIENT
Start: 2020-09-01 | End: 2020-09-08 | Stop reason: HOSPADM

## 2020-09-01 RX ADMIN — BACLOFEN 10 MG: 10 TABLET ORAL at 20:30

## 2020-09-01 RX ADMIN — IOHEXOL 85 ML: 350 INJECTION, SOLUTION INTRAVENOUS at 13:51

## 2020-09-01 RX ADMIN — OYSTER SHELL CALCIUM WITH VITAMIN D 1 TABLET: 500; 200 TABLET, FILM COATED ORAL at 20:30

## 2020-09-01 RX ADMIN — VALPROIC ACID 500 MG: 250 SOLUTION ORAL at 20:30

## 2020-09-01 RX ADMIN — FERROUS SULFATE TAB 325 MG (65 MG ELEMENTAL FE) 325 MG: 325 (65 FE) TAB at 20:31

## 2020-09-01 RX ADMIN — SODIUM CHLORIDE 1250 ML: 0.9 INJECTION, SOLUTION INTRAVENOUS at 13:14

## 2020-09-01 RX ADMIN — TRAZODONE HYDROCHLORIDE 100 MG: 100 TABLET ORAL at 20:30

## 2020-09-01 RX ADMIN — SODIUM CHLORIDE 75 ML/HR: 0.9 INJECTION, SOLUTION INTRAVENOUS at 19:11

## 2020-09-01 RX ADMIN — LORAZEPAM 0.5 MG: 0.5 TABLET ORAL at 20:30

## 2020-09-01 RX ADMIN — ARIPIPRAZOLE 30 MG: 10 TABLET ORAL at 20:34

## 2020-09-01 RX ADMIN — CEFEPIME HYDROCHLORIDE 2000 MG: 2 INJECTION, POWDER, FOR SOLUTION INTRAVENOUS at 14:33

## 2020-09-01 RX ADMIN — PRAVASTATIN SODIUM 40 MG: 40 TABLET ORAL at 20:31

## 2020-09-01 NOTE — ASSESSMENT & PLAN NOTE
Lab Results   Component Value Date    HGBA1C 5 8 (H) 08/01/2020       Recent Labs     09/01/20  1816 09/01/20  1945 09/01/20 2050 09/02/20  0757   POCGLU 149* 142* 171* 93       Blood Sugar Average: Last 72 hrs:    Plan:  · Hold home metformin  · Insulin sliding scale and Accu-Chek  · Diabetic

## 2020-09-01 NOTE — ASSESSMENT & PLAN NOTE
POA, the Video Barium Study performed on 08/04/2020 showed "significant oropharyngeal dysphagia characterized primarily by slow, weak tongue "pump" transfer, delayed swallowing initiation, incomplete airway protection and WITH NO RESPONSE TO PENETRATION OR TO PASSIVE ASPIRATION OVER TIME TODAY"    Plan:  Modified dysphagia diet, pureed, pudding thick

## 2020-09-01 NOTE — ASSESSMENT & PLAN NOTE
POA, patient is on daily Macrodantin as patient has a history of colonizer with Klebsiella, urine micro and urinalysis show UTI    · CT:  Thickened bladder wall likely due to acute cystitis  · History of UTI, Enterobacter      Plan:  · IV cefepime 1 g q 12h  · Hold home Macrodantin  · Follow-up urine cultures

## 2020-09-01 NOTE — ASSESSMENT & PLAN NOTE
POA, the VBS performed on 08/04/2020 showed "significant oropharyngeal dysphagia characterized primarily by slow, weak tongue "pump" transfer, delayed swallowing initiation, incomplete airway protection and WITH NO RESPONSE TO PENETRATION OR TO PASSIVE ASPIRATION OVER TIME TODAY"    Plan:  Modified dysphagia diet, pureed, pudding thick

## 2020-09-01 NOTE — ASSESSMENT & PLAN NOTE
POA, patient has history of iron deficiency anemia and is on home ferrous sulfate 325 mg b i d   · Hg = 11 1      Plan:  · Continue iron supplementation

## 2020-09-01 NOTE — ASSESSMENT & PLAN NOTE
POA, patient has history of iron deficiency anemia and is on home ferrous sulfate 325 mg b i d   · Hg = 11 1      Plan:  · Continue iron supplementation  · Follow up folate and B12

## 2020-09-01 NOTE — TELEPHONE ENCOUNTER
Omeprazole sent to pharmacy and placed orders for UA, Urine culture, C diff, stool culture, and ova parasite exam due to change in bowels

## 2020-09-01 NOTE — TELEPHONE ENCOUNTER
Abbi Bazan called and asked to speak with [facility] since she has been discussing Terrie's condition with her recently  Armida Belle has been fluctuating with temperatures around 98-99  This morning she has looses stools and temp of 100 5   She wants to know if she should take her to ER

## 2020-09-01 NOTE — TELEPHONE ENCOUNTER
[facility], she states patient's condition is deteriorating, temp 101 2, pulse ox 88%, patient lethargic  She is calling 911 to transport to ER  Assured her you would be notified

## 2020-09-01 NOTE — ASSESSMENT & PLAN NOTE
POA, bipolar disorder controlled with Abilify 30 mg q h s  and Depakote 250 mg/5ml 10 mL p o  B i d      Plan:  Continue home medications

## 2020-09-01 NOTE — H&P
H&P- Jackie Weldon 1960, 64 y o  female MRN: [id number]    Unit/Bed#: S -01 Encounter: [id number]    Primary Care Provider: Davide Perez DO   Date and time admitted to hospital: 9/1/2020 10:10 AM        * Severe sepsis [facility])  Assessment & Plan  53-year-old female with history of recurrent UTIs on daily nitrofurantoin, cerebral palsy, bipolar, GERD, hemicolectomy for volvulus, presents from group home with caregiver with 3 days of intermittent fevers, T-max 101 2°, increased loose to watery, nonbloody bowel movements  Patient meets severe sepsis criteria likely secondary to UTI  Cannot rule out pneumonia or C diff at this point  · Lactic acid: 4 9  · Tachycardia greater than 90  · BP 89/57, however per caregiver, this is close to baseline  · UA/Urine Micro = bacteria innumerable, nitrite+ leuk+  · Leukocytosis, WBC = 11 6  · CXR:  Unremarkable  · CT:   · Groundglass density left base with the areas of basilar consolidation at the right lung base may be acute or chronic  May represent acute infiltrate or nonresolved previously noted infiltrate    · Thickened urinary bladder wall       Plan:  · IV cefepime 1 g q 12h  · IVF normal saline 75 cc/hr  · Follow-up C diff  · Follow-up urine culture  · Follow-up CBC  · Respiratory protocol      Acute cystitis without hematuria  Assessment & Plan  POA, patient is on daily Macrodantin as patient has a history of colonizer with Klebsiella, urine micro and urinalysis show UTI  · CT:  Thickened bladder wall likely due to acute cystitis  · History of UTI, Enterobacter      Plan:  · IV cefepime 1 g q 12h  · Hold home Macrodantin  · Follow-up urine cultures    Cerebral palsy (HCC)  Assessment & Plan  POA, patient mentates at baseline, has muscular rigidity secondary to cerebral palsy  · Baseline spastic quadriparesis and intellectual disability    Plan:  · Continue baclofen 10 mg t i d   For spasticity    Dysphagia  Assessment & Plan  POA, the Video Barium Study performed on 08/04/2020 showed "significant oropharyngeal dysphagia characterized primarily by slow, weak tongue "pump" transfer, delayed swallowing initiation, incomplete airway protection and WITH NO RESPONSE TO PENETRATION OR TO PASSIVE ASPIRATION OVER TIME TODAY"    Plan:  Modified dysphagia diet, pureed, pudding thick    Bipolar disorder (HCC)  Assessment & Plan  POA, bipolar disorder controlled with Abilify 30 mg q h s  and Depakote 250 mg/5ml 10 mL p o  B i d  Plan:  Continue home medications    Gastroesophageal reflux disease without esophagitis  Assessment & Plan  POA, GERD controlled on PPI    Plan:  Protonix 20 mg q d  Hypothyroidism  Assessment & Plan  POA, controlled, Synthroid 25 mcg q a m  Plan:  · Continue Synthroid     Iron deficiency anemia  Assessment & Plan  POA, patient has history of iron deficiency anemia and is on home ferrous sulfate 325 mg b i d   · Hg = 11 1      Plan:  · Continue iron supplementation    Type 2 diabetes mellitus, without long-term current use of insulin ([facility])  Assessment & Plan  Lab Results   Component Value Date    HGBA1C 5 8 (H) 08/01/2020       Recent Labs     09/01/20  1816   POCGLU 149*       Blood Sugar Average: Last 72 hrs:    Plan:  · Hold home metformin  · Insulin sliding scale and Accu-Chek  · Diabetic  (P) 149    VTE Prophylaxis: Enoxaparin (Lovenox)  / sequential compression device   Code Status: Level 1  POLST: POLST form is not discussed and not completed at this time  Anticipated Length of Stay:  Patient will be admitted on an Inpatient basis with an anticipated length of stay of more than 2 midnights     Justification for Hospital Stay:  Sepsis Criteria w/UTI  Chief Complaint:   Kelsey Desai has been having fevers and loose BMs"    History of Present Illness:    Neeru Yepez is a 64 y o  female with history of cerebral palsy, impulse control disorder recurrent UTI (Klebsiella colonizer), sigmoid resection, and extensive psychiatric history,  who presents with 3 days of intermittent fevers and increased loose to watery bowel movements and decreased SpO2 around low 90s  Patient has limited verbal skills and history was provided by the caregiver at bedside  Per caregiver, patient has had decreased appetite, fevers with a T-max of 101 2°, and multiple loose to watery bowel movements per day  One episode of nonbilious nonbloody emesis 2 days prior to fevers, unsure if pt aspirated  Denies:  Cough, rhinorrhea, recent travel  rash      Of note, Patient was recently hospitalized for toxic metabolic encephalopathy likely 2/2 to UTI on 07/26/2020 and was diagnosed with dysphagia at that time  Review of Systems:    Review of Systems   Constitutional: Positive for appetite change and fever  HENT: Negative for congestion and drooling  Respiratory: Negative for cough  Gastrointestinal: Positive for diarrhea     Unable to perform complete ROS as patient is nonverbal    Past Medical and Surgical History:     Past Medical History:   Diagnosis Date    Adjustment disorder     Altered mental status 12/11/2015    Anemia     Bipolar 1 disorder (HCC)     Cerebral palsy ([address] )     Chronic hypernatremia 2/6/2016    Closed fracture of left hip ([address] ) 1/19/2016    Closed left hip fracture (HCC)     no surgery    Constipation     Dehydration 2/20/2016    Diabetes mellitus ([address] )     Disease of thyroid gland     Diverticulosis     Fracture of multiple ribs of right side     Hip fracture ([address] ) 07/26/2015    left    Hyperlipidemia     Hypernatremia 12/28/2018    Hypotension     Impulse control disorder     Incontinence     Intellectual disability due to developmental disorder, unspecified     Microalbuminuria     Osteopathia     Osteoporosis     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[address] )     Thrombocytopenia ([address] ) 7/31/2015       Past Surgical History:   Procedure Laterality Date    ABDOMINAL SURGERY      COLECTOMY MIN      re-anastomosis 7/22/16    COLONOSCOPY N/A 7/21/2016    Procedure: COLONOSCOPY;  Surgeon: Dexter Wright MD;  [facility]; Service:     COLONOSCOPY N/A 1/31/2016    Procedure: COLONOSCOPY;  Surgeon: Dexter Wright MD;  [facility];  Service:     COLONOSCOPY N/A 7/25/2017    Procedure: COLONOSCOPY;  Surgeon: Dexter Wright MD;  [facility]; Service: Colorectal    COLOSTOMY      EXPLORATORY LAPAROTOMY W/ BOWEL RESECTION N/A 1/31/2016    Procedure: exploratory laparotomy, left sigmoidectomy, coloproctostomy, take down splenic flexure, loop colostomy;  Surgeon: Dexter Wright MD;  [facility];  Service:     OK CLOSE ENTEROSTOMY N/A 7/22/2016    Procedure: SEGMENTAL COLECTOMY WITH COLOCOLOSTOMY;  Surgeon: Dexter Wright MD;  [facility];  Service: Colorectal       Meds/Allergies:    Prior to Admission medications    Medication Sig Start Date End Date Taking?  Authorizing Provider   acetaminophen (TYLENOL) 325 mg tablet Take 2 tablets (650 mg total) by mouth every 6 (six) hours as needed (pain, fever greater than 100 4) 6/8/18   Rocio Iniguez DO   ARIPiprazole (ABILIFY) 30 mg tablet Take 1 tablet (30 mg) by mouth daily at 8 pm 10/2/18   Evelyn Galeano MD   baclofen 10 mg tablet Take 1 tablet (10 mg total) by mouth 3 (three) times a day 4/22/20   Juan Parmar MD   benzonatate (TESSALON PERLES) 100 mg capsule Take 1 capsule (100 mg total) by mouth every 8 (eight) hours as needed for cough 12/22/18   Wendi Leung MD   calcium carbonate (OS-MORRIS) 600 MG tablet Take 1 tablet (600 mg total) by mouth 2 (two) times a day with meals 6/11/20   Rc Caballero MD   carbamide peroxide (DEBROX) 6 5 % otic solution Administer 2 drops into both ears daily as needed 2 drops in the affected ear prn x 5 days    Historical Provider, MD   carboxymethylcellulose (REFRESH TEARS) 0 5 % SOLN 1 drop 3 (three) times a day as needed for dry eyes    Historical Provider, MD   Cholecalciferol (Vitamin D) 50 MCG (2000 UT) tablet Take 1 tablet (2,000 Units total) by mouth daily 8/6/20   Nighat Guzman DO   Citalopram Hydrobromide (CELEXA PO) Take 40 mg by mouth every morning      Historical Provider, MD   denosumab (PROLIA) 60 mg/mL Inject 1 mL (60 mg total) under the skin once for 1 dose 4/7/20 4/7/20  Bhargav Ma MD   docosanol (ABREVA) 10 % Apply topically 5 (five) times a day Apply and rub in 5x a day until healed as needed for lesion 10/2/18   Melodie Shukla MD   ferrous sulfate 324 (65 Fe) mg Take 325 mg by mouth 2 (two) times a day  Historical Provider, MD   fexofenadine (ALLEGRA) 180 MG tablet Take 180 mg by mouth as needed      Historical Provider, MD   fluticasone (FLONASE) 50 mcg/act nasal spray 1 spray into each nostril daily as needed for rhinitis or allergies 4/7/20   Bhargav Ma MD   hydrocortisone 1 % cream Apply topically to affected area twice daily as needed for rash 6/11/20   Bhargav Ma MD   levothyroxine (SYNTHROID) 25 mcg tablet Take 1 tablet (25 mcg) by mouth every morning 10/2/18   Melodie Shukla MD   LORazepam (ATIVAN) 0 5 mg tablet Take 0 5 mg by mouth 2 (two) times a day        Historical Provider, MD   metFORMIN (GLUCOPHAGE) 1000 MG tablet Take 1 tablet (1,000 mg total) by mouth 2 (two) times a day with meals 12/20/19   Bhargav Ma MD   neomycin-bacitracin-polymyxin b (NEOSPORIN) ointment Apply 1 application topically 2 (two) times a day as needed    Historical Provider, MD   nitrofurantoin (MACRODANTIN) 50 mg capsule Take 1 capsule (50 mg total) by mouth daily at bedtime 8/6/20 9/5/20  Nighat Guzman DO   olopatadine HCl (PATADAY) 0 2 % opth drops Administer 1 drop to both eyes as needed    Historical Provider, MD   omeprazole (PriLOSEC) 20 mg delayed release capsule Take 1 capsule (20 mg total) by mouth daily 8/31/20 10/30/20  Nighat Orrum, DO   Oxybutynin Chloride (DITROPAN XL PO) Take 5 mg by mouth 3 (three) times a day     Historical Provider, MD   Polyethylene Glycol 3350 (MIRALAX PO) Take 17 g by mouth every morning      Historical Provider, MD   simvastatin (ZOCOR) 20 mg tablet Take 1 tablet (20 mg total) by mouth daily at bedtime at 4 pm for hyperlipidemia  Patient taking differently: Take 20 mg by mouth daily with dinner at 4 pm for hyperlipidemia 10/2/18   Governor Brooklynn MD   Starch, Thickening, (Resource ThickenUp) PACK Take 1 each by mouth 3 (three) times a day with meals 8/5/20   Carlos Tillman DO   traZODone (DESYREL) 100 mg tablet Take 1 tablet (100 mg total) by mouth daily at  8 pm for depression 10/2/18   Governor Brooklynn MD   valproic acid (DEPAKENE) 250 MG/5ML soln Take 10 mL (500 mg total) by mouth 2 (two) times a day 8/5/20 10/4/20  Karolina Dunn MD   Vitamins A & D (VITAMIN A & D) ointment Apply topically as needed for dry skin    Historical Provider, MD   famotidine (PEPCID) 20 mg tablet Take 1 tablet (20 mg total) by mouth daily 8/26/20 9/1/20  Eladio Moser DO     Reviewed with caregiver    Allergies: No Known Allergies    Social History:     Marital Status: Single   Patient Pre-hospital Living Situation:  Group home  Patient Pre-hospital Level of Mobility:  Poorly, with walker  Patient Pre-hospital Diet Restrictions:  None  Substance Use History:   Social History     Substance and Sexual Activity   Alcohol Use No    Frequency: Never    Binge frequency: Never     Social History     Tobacco Use   Smoking Status Never Smoker   Smokeless Tobacco Never Used     Social History     Substance and Sexual Activity   Drug Use No       Family History:    Family History   Problem Relation Age of Onset    Alcohol abuse Mother     Alcohol abuse Father     Diabetes Sister     No Known Problems Sister     No Known Problems Sister        Physical Exam:     Vitals:   Blood Pressure: 155/82 (09/01/20 1746)  Pulse: (!) 118 (09/01/20 1746)  Temperature: 98 4 °F (36 9 °C) (09/01/20 1746)  Temp Source: Axillary (09/01/20 1746)  Respirations: 18 (09/01/20 1433)  SpO2: 92 % (09/01/20 1746)    Physical Exam  Vitals signs and nursing note reviewed  Constitutional:       General: She is not in acute distress  Appearance: She is ill-appearing  HENT:      Head: Normocephalic  Right Ear: External ear normal       Left Ear: External ear normal       Nose: Nose normal       Mouth/Throat:      Mouth: Mucous membranes are moist    Eyes:      Conjunctiva/sclera: Conjunctivae normal       Pupils: Pupils are equal, round, and reactive to light  Neck:      Musculoskeletal: Normal range of motion  Comments: Possibly tender  Cardiovascular:      Rate and Rhythm: Regular rhythm  Tachycardia present  Pulmonary:      Effort: Pulmonary effort is normal       Comments: Decreased since patient will not follow commands  Abdominal:      General: Abdomen is flat  Bowel sounds are normal  There is no distension  Palpations: Abdomen is soft  Musculoskeletal:         General: No tenderness  Right lower leg: No edema  Left lower leg: No edema  Comments: Spasticity   Skin:     General: Skin is warm and dry  Capillary Refill: Capillary refill takes less than 2 seconds  Neurological:      Mental Status: She is alert  She is disoriented  Additional Data:     Lab Results: I have personally reviewed pertinent reports  Results from last 7 days   Lab Units 09/01/20  1244   WBC Thousand/uL 11 60*   HEMOGLOBIN g/dL 11 1*   HEMATOCRIT % 33 9*   PLATELETS Thousands/uL 184   NEUTROS PCT % 63   LYMPHS PCT % 16   MONOS PCT % 7   EOS PCT % 11*     Results from last 7 days   Lab Units 09/01/20  1244   POTASSIUM mmol/L 4 1   CHLORIDE mmol/L 101   CO2 mmol/L 31   BUN mg/dL 12   CREATININE mg/dL 0 98   CALCIUM mg/dL 8 9   ALK PHOS U/L 68   ALT U/L 12   AST U/L 10     Results from last 7 days   Lab Units 09/01/20  1244   INR  0 98       Imaging: I have personally reviewed pertinent reports  Xr Chest 1 View Portable    Result Date: 9/1/2020  Narrative: CHEST INDICATION:   rule out aspiration  COMPARISON:  Compared with 7/26/2020 EXAM PERFORMED/VIEWS:  XR CHEST PORTABLE FINDINGS: Cardiomediastinal silhouette appears unremarkable  The lungs are clear  No pneumothorax or pleural effusion  Osseous structures appear within normal limits for patient age  Impression: No acute cardiopulmonary disease  No change from prior  Workstation performed: [id number]     Fl Barium Swallow Video W Speech    Result Date: 8/4/2020  Narrative: A video barium swallow study was performed by the Department of Speech Pathology  Please refer to the report for the official interpretation  The images are stored for archival purposes only  Study images were not formally reviewed by the Radiology Department  Ct Abdomen Pelvis With Contrast    Result Date: 9/1/2020  Narrative: CT ABDOMEN AND PELVIS WITH IV CONTRAST INDICATION:   Abdominal infection suspected  COMPARISON:  March 27, 2017 TECHNIQUE:  CT examination of the abdomen and pelvis was performed  Axial, sagittal, and coronal 2D reformatted images were created from the source data and submitted for interpretation  Radiation dose length product (DLP) for this visit:  336 mGy-cm   This examination, like all CT scans performed in the [facility], was performed utilizing techniques to minimize radiation dose exposure, including the use of iterative reconstruction and automated exposure control  IV Contrast:  85 mL of iohexol (OMNIPAQUE) Enteric Contrast:  Enteric contrast was not administered  FINDINGS: ABDOMEN LOWER CHEST: Bibasilar groundglass density seen there are right basilar areas of consolidation LIVER/BILIARY TREE:  Unremarkable  GALLBLADDER:  No calcified gallstones  No pericholecystic inflammatory change  SPLEEN:  Unremarkable  PANCREAS:  Unremarkable  ADRENAL GLANDS:  Unremarkable  KIDNEYS/URETERS:  Unremarkable  No hydronephrosis  STOMACH AND BOWEL:  Gastric: Rounded calcific density seen within the right colon may represent a large enterolith  This is seen in the 42 of series 2  This is located adjacent to the anastomosis from prior segmental colonic resection  This measures 2 2 cm APPENDIX:  No findings to suggest appendicitis  ABDOMINOPELVIC CAVITY:  No ascites  No pneumoperitoneum  No lymphadenopathy  VESSELS:  Unremarkable for patient's age  PELVIS REPRODUCTIVE ORGANS:  Uterus appears unremarkable URINARY BLADDER:  There is thickening of the urinary bladder wall  The urinary bladder is nondistended ABDOMINAL WALL/INGUINAL REGIONS:  Unremarkable  OSSEOUS STRUCTURES:  No acute compression collapse No gross lytic lesion     Impression: No intra-abdominal fluid collection No bowel obstruction Postsurgical changes from prior segmental colonic resection Rounded 2 2 cm calcific density seen within the right side of the transverse colon may be due to enterolith  Groundglass density left base with the areas of basilar consolidation at the right lung base may be acute or chronic  May represent acute infiltrate or nonresolved previously noted infiltrate  Follow-up chest CT suggested in 2 months Thickened urinary bladder wall, correlate with urinary evaluation for cystitis/urinary tract infection The study was marked in EPIC for significant notification  Workstation performed: [id number]       EKG, Pathology, and Other Studies Reviewed on Admission:   · EKG:  Reviewed    Epic / Care Everywhere Records Reviewed: Yes     ** Please Note: This note has been constructed using a voice recognition system   **

## 2020-09-01 NOTE — ASSESSMENT & PLAN NOTE
POA, patient is on daily Macrodantin as patient has a history of colonizer with Klebsiella, urine micro and urinalysis show UTI    · CT:  Thickened bladder wall likely due to acute cystitis  · History of UTI, Enterobacter      Plan:  · Start IV ceftriaxone 1 g Q 12  · Patient has history UTIs with Enterobacter under susceptible to ceftriaxone  · Hold home Macrodantin  · Follow-up urine cultures

## 2020-09-01 NOTE — ASSESSMENT & PLAN NOTE
61-year-old female with history of recurrent UTIs on daily nitrofurantoin, cerebral palsy, bipolar, GERD, hemicolectomy for volvulus, presents from group home with caregiver with 3 days of intermittent fevers, T-max 101 2°, and increased loose to watery, nonbloody bowel movements  Patient is found to have a UTI and meets severe sepsis criteria  Rule out C diff  · Lactic acid: 4 9  · Tachycardia greater than 90  · BP 89/57, however per caregiver, this is close to baseline  · UA/Urine Micro = bacteria innumerable, nitrite+ leuk+  · Leukocytosis, WBC = 11 6  · CXR:  Unremarkable  · CT:   · Groundglass density left base with the areas of basilar consolidation at the right lung base may be acute or chronic    May represent acute infiltrate or nonresolved previously noted infiltrate    · Thickened urinary bladder wall       Plan:  · IV cefepime 1 g q 12h  · IVF normal saline 75 cc/hr  · Follow-up C diff  · Follow-up urine culture  · Follow-up CBC  · Respiratory protocol

## 2020-09-01 NOTE — ASSESSMENT & PLAN NOTE
POA, patient mentates at baseline, has muscular rigidity secondary to cerebral palsy  · Baseline spastic quadriparesis and intellectual disability    Plan:  · Continue baclofen 10 mg t i d   For spasticity

## 2020-09-01 NOTE — ED PROVIDER NOTES
History  Chief Complaint   Patient presents with    Fever - 9 weeks to 74 years     sent from care home by ems for fever that started this morning, recently inpatient with new dx of dysphagia  Cherly Goldberg is a 64year-old female with history of cerebral palsy with spasticity, frequent UTIs on daily macrobid for Klebsiella colonization, newly diagnosed dysphagia, hypothyroidism, t2dm, and intellectual disability, presenting from care home for evaluation of fever reported by staff this morning  Tmax 101 2 temporal  Patient is non-verbal at baseline, history obtained by patient's caregiver  She relates that yesterday evening the patient developed low-grade fevers  Patient has otherwise been acting her baseline and has remained alert  She denies any noticeable change in urine color or appearance  She is unaware of any reported aspiration events  She does note that she was made aware by other staff members that the patient has been having episodes of profuse watery diarrhea beginning this weekend  Patient was not given antipyretics prior to hospital arrival       History provided by:  Caregiver  History limited by:  Patient nonverbal  Fever - 9 weeks to 74 years   Max temp prior to arrival:  101 2   Temp source:  Temporal  Severity:  Moderate  Onset quality:  Gradual  Duration:  1 day  Progression:  Waxing and waning  Chronicity:  Recurrent  Ineffective treatments:  None tried  Associated symptoms: diarrhea        Prior to Admission Medications   Prescriptions Last Dose Informant Patient Reported? Taking?    ARIPiprazole (ABILIFY) 30 mg tablet  Care Giver No No   Sig: Take 1 tablet (30 mg) by mouth daily at 8 pm   Cholecalciferol (Vitamin D) 50 MCG (2000 UT) tablet   No No   Sig: Take 1 tablet (2,000 Units total) by mouth daily   Citalopram Hydrobromide (CELEXA PO)  Care Giver Yes No   Sig: Take 40 mg by mouth every morning     LORazepam (ATIVAN) 0 5 mg tablet  Care Giver Yes No   Sig: Take 0 5 mg by mouth 2 (two) times a day  Oxybutynin Chloride (DITROPAN XL PO)  Care Giver Yes No   Sig: Take 5 mg by mouth 3 (three) times a day    Polyethylene Glycol 3350 (MIRALAX PO)  Care Giver Yes No   Sig: Take 17 g by mouth every morning     Starch, Thickening, (Resource ThickenUp) PACK   No No   Sig: Take 1 each by mouth 3 (three) times a day with meals   Vitamins A & D (VITAMIN A & D) ointment  Care Giver Yes No   Sig: Apply topically as needed for dry skin   acetaminophen (TYLENOL) 325 mg tablet  Care Giver No No   Sig: Take 2 tablets (650 mg total) by mouth every 6 (six) hours as needed (pain, fever greater than 100 4)   baclofen 10 mg tablet   No No   Sig: Take 1 tablet (10 mg total) by mouth 3 (three) times a day   benzonatate (TESSALON PERLES) 100 mg capsule  Care Giver No No   Sig: Take 1 capsule (100 mg total) by mouth every 8 (eight) hours as needed for cough   calcium carbonate (OS-MORRIS) 600 MG tablet   No No   Sig: Take 1 tablet (600 mg total) by mouth 2 (two) times a day with meals   carbamide peroxide (DEBROX) 6 5 % otic solution  Care Giver Yes No   Sig: Administer 2 drops into both ears daily as needed 2 drops in the affected ear prn x 5 days   carboxymethylcellulose (REFRESH TEARS) 0 5 % SOLN  Care Giver Yes No   Si drop 3 (three) times a day as needed for dry eyes   denosumab (PROLIA) 60 mg/mL   No No   Sig: Inject 1 mL (60 mg total) under the skin once for 1 dose   docosanol (ABREVA) 10 %  Care Giver No No   Sig: Apply topically 5 (five) times a day Apply and rub in 5x a day until healed as needed for lesion   famotidine (PEPCID) 20 mg tablet   No No   Sig: Take 1 tablet (20 mg total) by mouth daily   ferrous sulfate 324 (65 Fe) mg  Care Giver Yes No   Sig: Take 325 mg by mouth 2 (two) times a day     fexofenadine (ALLEGRA) 180 MG tablet  Care Giver Yes No   Sig: Take 180 mg by mouth as needed     fluticasone (FLONASE) 50 mcg/act nasal spray   No No   Si spray into each nostril daily as needed for rhinitis or allergies   hydrocortisone 1 % cream   No No   Sig: Apply topically to affected area twice daily as needed for rash   levothyroxine (SYNTHROID) 25 mcg tablet  Care Giver No No   Sig: Take 1 tablet (25 mcg) by mouth every morning   metFORMIN (GLUCOPHAGE) 1000 MG tablet  Care Giver No No   Sig: Take 1 tablet (1,000 mg total) by mouth 2 (two) times a day with meals   neomycin-bacitracin-polymyxin b (NEOSPORIN) ointment  Care Giver Yes No   Sig: Apply 1 application topically 2 (two) times a day as needed   nitrofurantoin (MACRODANTIN) 50 mg capsule   No No   Sig: Take 1 capsule (50 mg total) by mouth daily at bedtime   olopatadine HCl (PATADAY) 0 2 % opth drops  Care Giver Yes No   Sig: Administer 1 drop to both eyes as needed   omeprazole (PriLOSEC) 20 mg delayed release capsule   No No   Sig: Take 1 capsule (20 mg total) by mouth daily   simvastatin (ZOCOR) 20 mg tablet  Care Giver No No   Sig: Take 1 tablet (20 mg total) by mouth daily at bedtime at 4 pm for hyperlipidemia   Patient taking differently: Take 20 mg by mouth daily with dinner at 4 pm for hyperlipidemia   traZODone (DESYREL) 100 mg tablet  Care Giver No No   Sig: Take 1 tablet (100 mg total) by mouth daily at  8 pm for depression   valproic acid (DEPAKENE) 250 MG/5ML soln   No No   Sig: Take 10 mL (500 mg total) by mouth 2 (two) times a day      Facility-Administered Medications: None       Past Medical History:   Diagnosis Date    Adjustment disorder     Altered mental status 12/11/2015    Anemia     Bipolar 1 disorder (HCC)     Cerebral palsy (HCC)     Chronic hypernatremia 2/6/2016    Closed fracture of left hip (HCC) 1/19/2016    Closed left hip fracture (HCC)     no surgery    Constipation     Dehydration 2/20/2016    Diabetes mellitus [facility]     Disease of thyroid gland     Diverticulosis     Fracture of multiple ribs of right side     Hip fracture ([facility] ) 07/26/2015    left    Hyperlipidemia     Hypernatremia 12/28/2018  Hypotension     Impulse control disorder     Incontinence     Intellectual disability due to developmental disorder, unspecified     Microalbuminuria     Osteopathia     Osteoporosis     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HCC)     Thrombocytopenia ([facility] ) 7/31/2015       Past Surgical History:   Procedure Laterality Date    ABDOMINAL SURGERY      COLECTOMY MIN      re-anastomosis 7/22/16    COLONOSCOPY N/A 7/21/2016    Procedure: COLONOSCOPY;  Surgeon: Nate Huerta MD;  [facility]; Service:     COLONOSCOPY N/A 1/31/2016    Procedure: COLONOSCOPY;  Surgeon: Nate Huerta MD;  [facility];  Service:     COLONOSCOPY N/A 7/25/2017    Procedure: COLONOSCOPY;  Surgeon: Nate Huerta MD;  [facility]; Service: Colorectal    COLOSTOMY      EXPLORATORY LAPAROTOMY W/ BOWEL RESECTION N/A 1/31/2016    Procedure: exploratory laparotomy, left sigmoidectomy, coloproctostomy, take down splenic flexure, loop colostomy;  Surgeon: Nate Huerta MD;  [facility];  Service:     ND CLOSE ENTEROSTOMY N/A 7/22/2016    Procedure: SEGMENTAL COLECTOMY WITH COLOCOLOSTOMY;  Surgeon: Nate Huerta MD;  [facility];  Service: Colorectal       Family History   Problem Relation Age of Onset    Alcohol abuse Mother     Alcohol abuse Father     Diabetes Sister     No Known Problems Sister     No Known Problems Sister      I have reviewed and agree with the history as documented  E-Cigarette/Vaping    E-Cigarette Use Never User      E-Cigarette/Vaping Substances    Nicotine No     THC No     CBD No     Flavoring No     Other No     Unknown No      Social History     Tobacco Use    Smoking status: Never Smoker    Smokeless tobacco: Never Used   Substance Use Topics    Alcohol use: No     Frequency: Never     Binge frequency: Never    Drug use: No       Review of Systems   Unable to perform ROS: Patient nonverbal   Constitutional: Positive for fever  Gastrointestinal: Positive for diarrhea  Hx dysphagia   Genitourinary:        Hx UTIs       Physical Exam  Physical Exam  Vitals signs and nursing note reviewed  Constitutional:       Comments: Chronically ill-appearing and frail elderly female lying on exam bed  Alert, non-verbal at baseline   HENT:      Head: Normocephalic and atraumatic  Mouth/Throat:      Mouth: Mucous membranes are moist    Neck:      Musculoskeletal: Normal range of motion and neck supple  No neck rigidity  Cardiovascular:      Rate and Rhythm: Normal rate and regular rhythm  Heart sounds: Normal heart sounds  Pulmonary:      Effort: Pulmonary effort is normal  No tachypnea  Breath sounds: Decreased breath sounds present  No wheezing  Abdominal:      General: There is no distension  Palpations: Abdomen is soft  Tenderness: There is no abdominal tenderness  Comments: Large amount of brown colored loose stool in patient's diaper   Musculoskeletal:      Comments: Contractures and tremors in keeping with history of cerebral palsy   Skin:     General: Skin is warm and dry  Neurological:      Mental Status: Mental status is at baseline           Vital Signs  ED Triage Vitals [09/01/20 1100]   Temperature Pulse Respirations Blood Pressure SpO2   100 2 °F (37 9 °C) 88 18 (!) 85/57 93 %      Temp Source Heart Rate Source Patient Position - Orthostatic VS BP Location FiO2 (%)   Oral Monitor Lying Right arm --      Pain Score       --           Vitals:    09/01/20 1100 09/01/20 1327 09/01/20 1433   BP: (!) 85/57 (!) 189/85    Pulse: 88 95 92   Patient Position - Orthostatic VS: Lying Lying          Visual Acuity      ED Medications  Medications   sodium chloride (PF) 0 9 % injection 3 mL (has no administration in time range)   sodium chloride 0 9 % bolus 1,250 mL (1,250 mL Intravenous New Bag 9/1/20 1314)   sodium chloride 0 9 % bolus 10 mL (has no administration in time range)   metroNIDAZOLE (FLAGYL) IVPB (premix) 500 mg 100 mL (has no administration in time range)   cefepime (MAXIPIME) 2 g/50 mL dextrose IVPB (2,000 mg Intravenous New Bag 9/1/20 1433)   iohexol (OMNIPAQUE) 350 MG/ML injection (SINGLE-DOSE) 85 mL (85 mL Intravenous Given 9/1/20 1351)       Diagnostic Studies  Results Reviewed     Procedure Component Value Units Date/Time    Magnesium [[id number]]  (Normal) Collected:  09/01/20 1244    Lab Status:  Final result Specimen:  Blood from Arm, Right Updated:  09/01/20 1438     Magnesium 1 6 mg/dL     Urine Microscopic [[id number]]  (Abnormal) Collected:  09/01/20 1316    Lab Status:  Final result Specimen:  Urine, Straight Cath Updated:  09/01/20 1358     RBC, UA 1-2 /hpf      WBC, UA 10-20 /hpf      Epithelial Cells None Seen /hpf      Bacteria, UA Innumerable /hpf     Urine culture [[id number]] Collected:  09/01/20 1316    Lab Status: In process Specimen:  Urine, Straight Cath Updated:  09/01/20 1357    UA w Reflex to Microscopic w Reflex to Culture [[id number]]  (Abnormal) Collected:  09/01/20 1316    Lab Status:  Final result Specimen:  Urine, Straight Cath Updated:  09/01/20 1336     Color, UA Yellow     Clarity, UA Clear     Specific Gravity, UA 1 015     pH, UA 7 5     Leukocytes, UA Small     Nitrite, UA Positive     Protein, UA Negative mg/dl      Glucose, UA Negative mg/dl      Ketones, UA Negative mg/dl      Urobilinogen, UA 0 2 E U /dl      Bilirubin, UA Negative     Blood, UA Trace-Intact    Lactic Acid [[id number]]  (Abnormal) Collected:  09/01/20 1244    Lab Status:  Final result Specimen:  Blood from Arm, Right Updated:  09/01/20 1334     LACTIC ACID 4 9 mmol/L     Narrative:       Result may be elevated if tourniquet was used during collection      Lactic acid 2 Hours [[id number]]     Lab Status:  No result Specimen:  Blood     Comprehensive metabolic panel [[id number]]  (Abnormal) Collected:  09/01/20 1244    Lab Status:  Final result Specimen:  Blood from Arm, Right Updated:  09/01/20 1322 Sodium 139 mmol/L      Potassium 4 1 mmol/L      Chloride 101 mmol/L      CO2 31 mmol/L      ANION GAP 7 mmol/L      BUN 12 mg/dL      Creatinine 0 98 mg/dL      Glucose 164 mg/dL      Calcium 8 9 mg/dL      AST 10 U/L      ALT 12 U/L      Alkaline Phosphatase 68 U/L      Total Protein 6 1 g/dL      Albumin 2 1 g/dL      Total Bilirubin 0 10 mg/dL      eGFR 64 ml/min/1 73sq m     Narrative:       [place] guidelines for Chronic Kidney Disease (CKD):     Stage 1 with normal or high GFR (GFR > 90 mL/min/1 73 square meters)    Stage 2 Mild CKD (GFR = 60-89 mL/min/1 73 square meters)    Stage 3A Moderate CKD (GFR = 45-59 mL/min/1 73 square meters)    Stage 3B Moderate CKD (GFR = 30-44 mL/min/1 73 square meters)    Stage 4 Severe CKD (GFR = 15-29 mL/min/1 73 square meters)    Stage 5 End Stage CKD (GFR <15 mL/min/1 73 square meters)  Note: GFR calculation is accurate only with a steady state creatinine    Protime-INR [[id number]]  (Normal) Collected:  09/01/20 1244    Lab Status:  Final result Specimen:  Blood from Arm, Right Updated:  09/01/20 1306     Protime 13 1 seconds      INR 0 98    APTT [[id number]]  (Normal) Collected:  09/01/20 1244    Lab Status:  Final result Specimen:  Blood from Arm, Right Updated:  09/01/20 1306     PTT 23 seconds     Clostridium difficile toxin by PCR with EIA [[id number]]     Lab Status:  No result Specimen:  Stool     Stool Enteric Bacterial Panel by PCR [[id number]]     Lab Status:  No result Specimen:  Stool     CBC and differential [[id number]]  (Abnormal) Collected:  09/01/20 1244    Lab Status:  Final result Specimen:  Blood from Arm, Right Updated:  09/01/20 1254     WBC 11 60 Thousand/uL      RBC 3 13 Million/uL      Hemoglobin 11 1 g/dL      Hematocrit 33 9 %       fL      MCH 35 5 pg      MCHC 32 7 g/dL      RDW 12 9 %      MPV 10 5 fL      Platelets 269 Thousands/uL      nRBC 0 /100 WBCs      Neutrophils Relative 63 %      Immat GRANS % 2 % Lymphocytes Relative 16 %      Monocytes Relative 7 %      Eosinophils Relative 11 %      Basophils Relative 1 %      Neutrophils Absolute 7 45 Thousands/µL      Immature Grans Absolute 0 18 Thousand/uL      Lymphocytes Absolute 1 87 Thousands/µL      Monocytes Absolute 0 78 Thousand/µL      Eosinophils Absolute 1 26 Thousand/µL      Basophils Absolute 0 06 Thousands/µL     Blood culture #1 [[id number]] Collected:  09/01/20 1244    Lab Status: In process Specimen:  Blood from Arm, Right Updated:  09/01/20 1251    Procalcitonin with AM Reflex [[id number]] Collected:  09/01/20 1244    Lab Status: In process Specimen:  Blood from Arm, Right Updated:  09/01/20 1250    Blood culture #2 [[id number]]     Lab Status:  No result Specimen:  Blood                  CT abdomen pelvis with contrast   Final Result by Anna Herr MD (09/01 1414)      No intra-abdominal fluid collection      No bowel obstruction      Postsurgical changes from prior segmental colonic resection      Rounded 2 2 cm calcific density seen within the right side of the transverse colon may be due to enterolith       Groundglass density left base with the areas of basilar consolidation at the right lung base may be acute or chronic  May represent acute infiltrate or nonresolved previously noted infiltrate  Follow-up chest CT suggested in 2 months      Thickened urinary bladder wall, correlate with urinary evaluation for cystitis/urinary tract infection      The study was marked in EPIC for significant notification  Workstation performed: [id number]         XR chest 1 view portable   Final Result by Frida Drummond MD (09/01 3542)      No acute cardiopulmonary disease  No change from prior              Workstation performed: [id number]                    Procedures  ECG 12 Lead Documentation Only    Date/Time: 9/1/2020 10:50 AM  Performed by: Germán Conteh PA-C  Authorized by: Germán Conteh PA-C     Indications / Diagnosis:  Fever; sepsis  ECG reviewed by me, the ED Provider: yes    Patient location:  ED  Previous ECG:     Previous ECG:  Compared to current    Similarity:  Changes noted  Interpretation:     Interpretation: abnormal    Quality:     Tracing quality:  Limited by artifact  Rate:     ECG rate:  87  Rhythm:     Rhythm: junctional      Rhythm comment:  Accelerated  QRS:     QRS axis:  Normal  Comments:      ECG reviewed by myself and attending, compared to prior  Variant P wave morphology, baseline artifact             ED Course  ED Course as of Sep 01 2214   Tue Sep 01, 2020   1056 30cc/kg bolus ordered      1231 Nursing having difficulty obtaining peripheral access  Attempting to place PICC line      1355 Nitrite, UA(!): Positive   1355 Leukocytes, UA(!): Small   1355 Sepsis alert called   LACTIC ACID(!!): 4 9   1400 Patient had episodes of arrhythmia noted on telemetry, images sent to Cardiology on-call who personally reviewed these images and found that the arrhythmia is secondary to artifact given patient's baseline tremors and spasticity      1419 CT Abd IMPRESSION:     No intra-abdominal fluid collection  No bowel obstruction  Postsurgical changes from prior segmental colonic resection  Rounded 2 2 cm calcific density seen within the right side of the transverse colon may be due to enterolith   Groundglass density left base with the areas of basilar consolidation at the right lung base may be acute or chronic  May represent acute infiltrate or nonresolved previously noted infiltrate  Follow-up chest CT suggested in 2 months  Thickened urinary bladder wall, correlate with urinary evaluation for cystitis/urinary tract infection      1419 Will add Flagyl to abx regimen based on CT findings   [facility] paged for admission      1656 Spoke with [facility] resident, Dr Sandra Hollins accepts this patient for admission, bridging orders placed                  Initial Sepsis Screening     [address] Name 09/01/20 1357                Is the patient's history suggestive of a new or worsening infection? (!) Yes (Proceed)  -JF        Suspected source of infection  urinary tract infection  -JF        Are two or more of the following signs & symptoms of infection both present and new to the patient? (!) Yes (Proceed)  -JF        Indicate SIRS criteria  Tachycardia > 90 bpm;Leukocytosis (WBC > [address]86 IJL)  -JF        If the answer is yes to both questions, suspicion of sepsis is present          If severe sepsis is present AND tissue hypoperfusion perists in the hour after fluid resuscitation or lactate > 4, the patient meets criteria for SEPTIC SHOCK          Are any of the following organ dysfunction criteria present within 6 hours of suspected infection and SIRS criteria that are NOT considered to be chronic conditions? (!) Yes  -JF        Organ dysfunction  Lactate >/equal 4 0 mmol/L  -JF        Date of presentation of severe sepsis  09/01/20  -JF        Time of presentation of severe sepsis  1357  -        Tissue hypoperfusion persists in the hour after crystalloid fluid administration, evidenced, by either:          Was hypotension present within one hour of the conclusion of crystalloid fluid administration?         Date of presentation of septic shock          Time of presentation of septic shock            User Key  (r) = Recorded By, (t) = Taken By, (c) = Cosigned By    [address] Name Provider Type    [address] [place] [address] PA-C Physician Assistant                        MDM  Number of Diagnoses or Management Options  Consolidation of right lower lobe of lung [facility]): new and requires workup  History of cerebral palsy: established and worsening  Severe sepsis [facility]): new and requires workup  UTI (urinary tract infection): new and requires workup  Diagnosis management comments:  This is a 64year-old female nonverbal at baseline with history of cerebral palsy with spasticity, frequent UTIs on daily macrobid, dysphagia, t2dm, hypothyroidism arriving to the ED with caregiver for evaluation of fevers beginning yesterday evening  History obtained by patient's caregiver  She reports Tmax 101 2F today  She is unaware of any change in urine quality or aspiration events  She does report that since Saturday, the patient has had numerous episodes of watery diarrhea  Denies any vomiting  Differential diagnosis includes but not limited to: sepsis- suspected sources include pneumonia, UTI, infectious diarrhea; electrolyte imbalance, dehydration, intraabdominal infection, aspiration pneumonitis    Initial ED plan: ECG, labs, CXR, CT, fluids, abx -- likely admission    Final ED Assessment: On arrival, patient noted to be hypotensive, temp 100 2F  Remainder of vital signs stable  There was difficulty obtaining peripheral access to provide fluids, antibiotics, and draw labs  PICC team able to obtain access  Urinalysis notable for UTI, culture sent  Stool studies sent  Labs significant for lactate of 4 9 which at point a sepsis alert was called  Patient additionally had a leukocytosis of 11 6  Patient was resuscitated with 30cc/kg saline bolus per sepsis protocol  Patient given cefepime and flagyl in the ED  BP improved upon recheck  During ED course patient was noted to have abnormal activity on telemetry - rhythm strips sent to on-call Cardiology who personally reviewed images and found that this was secondary to patient's underlying tremor as the presence of QRS complexes remained visible  CT reports consolidation of RLL, acute vs chronic  Patient's caregiver updated of all lab and imaging results and recommendation for follow up CT chest in 2 months to reassess consolidation  She is agreeable with recommendation for hospital admission  D/w [facility] resident, Dr Janelle Munson accepts patient for admission  Bridging orders placed         Amount and/or Complexity of Data Reviewed  Clinical lab tests: ordered and reviewed  Tests in the radiology section of CPT®: ordered and reviewed  Obtain history from someone other than the patient: yes  Review and summarize past medical records: yes  Discuss the patient with other providers: yes  Independent visualization of images, tracings, or specimens: yes    Risk of Complications, Morbidity, and/or Mortality  Presenting problems: moderate  Diagnostic procedures: moderate  Management options: moderate    Patient Progress  Patient progress: stable        Disposition  Final diagnoses:   Severe sepsis ([facility] [address] )   UTI (urinary tract infection)   [facility]   History of cerebral palsy     Time reflects when diagnosis was documented in both MDM as applicable and the Disposition within this note     Time User Action Codes Description Comment    9/1/2020  3:06 PM Annice Hiss Add [A41 9,  R65 20] Severe sepsis ([facility] [address] )     9/1/2020  3:07 PM Annice Hiss Add [N39 0] UTI (urinary tract infection)     9/1/2020  3:08 PM Annice Hiss Add [J18 1] Consolidation of right lower lobe of lung ([facility] [address] )     9/1/2020  3:08 PM Annice Hiss Add [Z86 69] History of cerebral palsy       ED Disposition     ED Disposition Condition Date/Time Comment    Admit Stable Tue Sep 1, 2020  3:06 PM Case was discussed with LINDA and the patient's admission status was agreed to be Admission Status: inpatient status to the service of Dr R[facility]   Follow-up Information    None         Patient's Medications   Discharge Prescriptions    No medications on file     No discharge procedures on file      PDMP Review     None          ED Provider  Electronically Signed by           Brigette Gannon PA-C  09/01/20 1782

## 2020-09-01 NOTE — ASSESSMENT & PLAN NOTE
51-year-old female with history of recurrent UTIs on daily nitrofurantoin, cerebral palsy, bipolar, GERD, hemicolectomy for volvulus, presents from group home with caregiver with 3 days of intermittent fevers, T-max 101 2°, increased loose to watery, nonbloody bowel movements  Patient meets severe sepsis criteria likely secondary to UTI  Cannot rule out pneumonia or C diff at this point  · Lactic acid: 4 9  · Tachycardia greater than 90  · BP 89/57, however per caregiver, this is close to baseline  · UA/Urine Micro = bacteria innumerable, nitrite+ leuk+  · Leukocytosis, WBC = 11 6  · CXR:  Unremarkable  · CT:   · Groundglass density left base with the areas of basilar consolidation at the right lung base may be acute or chronic  May represent acute infiltrate or nonresolved previously noted infiltrate    · Thickened urinary bladder wall     Patient continues to be nonverbal   Patient was febrile to 102 9 (T-max) this morning and elevated leukocytosis    · WBC:  13 6 to from 11 6  · Eosinophilia:  1 7 from 1 2  · No rash  · Rule out parasitic infection    Plan:  · Transition to IV ceftriaxone 1 g q 12h from IV cefepime 1 g q 12h  · Continue IVF normal saline 75 cc/hr  · Follow-up C diff, ova parasites  · Follow-up urine and blood cultures  · Follow-up COVID test  · Respiratory protocol  · Repeat CBC with diff tomorrow

## 2020-09-02 LAB
ALBUMIN SERPL BCP-MCNC: 1.6 G/DL (ref 3.5–5)
ALP SERPL-CCNC: 56 U/L (ref 46–116)
ALT SERPL W P-5'-P-CCNC: 10 U/L (ref 12–78)
ANION GAP SERPL CALCULATED.3IONS-SCNC: 6 MMOL/L (ref 4–13)
AST SERPL W P-5'-P-CCNC: 10 U/L (ref 5–45)
BASOPHILS # BLD AUTO: 0.04 THOUSANDS/ΜL (ref 0–0.1)
BASOPHILS NFR BLD AUTO: 0 % (ref 0–1)
BILIRUB SERPL-MCNC: 0.11 MG/DL (ref 0.2–1)
BUN SERPL-MCNC: 16 MG/DL (ref 5–25)
C DIFF TOX GENS STL QL NAA+PROBE: NEGATIVE
CALCIUM SERPL-MCNC: 8 MG/DL (ref 8.3–10.1)
CHLORIDE SERPL-SCNC: 108 MMOL/L (ref 100–108)
CO2 SERPL-SCNC: 28 MMOL/L (ref 21–32)
CREAT SERPL-MCNC: 1.05 MG/DL (ref 0.6–1.3)
EOSINOPHIL # BLD AUTO: 1.7 THOUSAND/ΜL (ref 0–0.61)
EOSINOPHIL NFR BLD AUTO: 13 % (ref 0–6)
ERYTHROCYTE [DISTWIDTH] IN BLOOD BY AUTOMATED COUNT: 13.2 % (ref 11.6–15.1)
FOLATE SERPL-MCNC: 19.1 NG/ML (ref 3.1–17.5)
GFR SERPL CREATININE-BSD FRML MDRD: 59 ML/MIN/1.73SQ M
GLUCOSE SERPL-MCNC: 101 MG/DL (ref 65–140)
GLUCOSE SERPL-MCNC: 125 MG/DL (ref 65–140)
GLUCOSE SERPL-MCNC: 167 MG/DL (ref 65–140)
GLUCOSE SERPL-MCNC: 187 MG/DL (ref 65–140)
GLUCOSE SERPL-MCNC: 93 MG/DL (ref 65–140)
HCT VFR BLD AUTO: 28.6 % (ref 34.8–46.1)
HGB BLD-MCNC: 9.5 G/DL (ref 11.5–15.4)
IMM GRANULOCYTES # BLD AUTO: 0.15 THOUSAND/UL (ref 0–0.2)
IMM GRANULOCYTES NFR BLD AUTO: 1 % (ref 0–2)
LYMPHOCYTES # BLD AUTO: 1.78 THOUSANDS/ΜL (ref 0.6–4.47)
LYMPHOCYTES NFR BLD AUTO: 13 % (ref 14–44)
MCH RBC QN AUTO: 35.6 PG (ref 26.8–34.3)
MCHC RBC AUTO-ENTMCNC: 33.2 G/DL (ref 31.4–37.4)
MCV RBC AUTO: 107 FL (ref 82–98)
MONOCYTES # BLD AUTO: 0.9 THOUSAND/ΜL (ref 0.17–1.22)
MONOCYTES NFR BLD AUTO: 7 % (ref 4–12)
NEUTROPHILS # BLD AUTO: 9.05 THOUSANDS/ΜL (ref 1.85–7.62)
NEUTS SEG NFR BLD AUTO: 66 % (ref 43–75)
NRBC BLD AUTO-RTO: 0 /100 WBCS
PLATELET # BLD AUTO: 140 THOUSANDS/UL (ref 149–390)
PMV BLD AUTO: 10.4 FL (ref 8.9–12.7)
POTASSIUM SERPL-SCNC: 3.8 MMOL/L (ref 3.5–5.3)
PROCALCITONIN SERPL-MCNC: 4.49 NG/ML
PROT SERPL-MCNC: 5.2 G/DL (ref 6.4–8.2)
RBC # BLD AUTO: 2.67 MILLION/UL (ref 3.81–5.12)
SARS-COV-2 RNA RESP QL NAA+PROBE: NEGATIVE
SODIUM SERPL-SCNC: 142 MMOL/L (ref 136–145)
TSH SERPL DL<=0.05 MIU/L-ACNC: 0.71 UIU/ML (ref 0.36–3.74)
VIT B12 SERPL-MCNC: 1193 PG/ML (ref 100–900)
WBC # BLD AUTO: 13.62 THOUSAND/UL (ref 4.31–10.16)

## 2020-09-02 PROCEDURE — 87493 C DIFF AMPLIFIED PROBE: CPT | Performed by: PHYSICIAN ASSISTANT

## 2020-09-02 PROCEDURE — 82607 VITAMIN B-12: CPT | Performed by: PSYCHIATRY & NEUROLOGY

## 2020-09-02 PROCEDURE — 80053 COMPREHEN METABOLIC PANEL: CPT | Performed by: PSYCHIATRY & NEUROLOGY

## 2020-09-02 PROCEDURE — 87046 STOOL CULTR AEROBIC BACT EA: CPT

## 2020-09-02 PROCEDURE — 82948 REAGENT STRIP/BLOOD GLUCOSE: CPT

## 2020-09-02 PROCEDURE — 82746 ASSAY OF FOLIC ACID SERUM: CPT | Performed by: PSYCHIATRY & NEUROLOGY

## 2020-09-02 PROCEDURE — 87045 FECES CULTURE AEROBIC BACT: CPT | Performed by: PHYSICIAN ASSISTANT

## 2020-09-02 PROCEDURE — 84443 ASSAY THYROID STIM HORMONE: CPT | Performed by: PSYCHIATRY & NEUROLOGY

## 2020-09-02 PROCEDURE — 99232 SBSQ HOSP IP/OBS MODERATE 35: CPT | Performed by: INTERNAL MEDICINE

## 2020-09-02 PROCEDURE — 84145 PROCALCITONIN (PCT): CPT | Performed by: PSYCHIATRY & NEUROLOGY

## 2020-09-02 PROCEDURE — 85025 COMPLETE CBC W/AUTO DIFF WBC: CPT | Performed by: PSYCHIATRY & NEUROLOGY

## 2020-09-02 PROCEDURE — 87209 SMEAR COMPLEX STAIN: CPT | Performed by: INTERNAL MEDICINE

## 2020-09-02 PROCEDURE — 87177 OVA AND PARASITES SMEARS: CPT | Performed by: INTERNAL MEDICINE

## 2020-09-02 PROCEDURE — 87635 SARS-COV-2 COVID-19 AMP PRB: CPT | Performed by: PSYCHIATRY & NEUROLOGY

## 2020-09-02 PROCEDURE — 87427 SHIGA-LIKE TOXIN AG IA: CPT

## 2020-09-02 RX ADMIN — PANTOPRAZOLE SODIUM 20 MG: 20 TABLET, DELAYED RELEASE ORAL at 05:29

## 2020-09-02 RX ADMIN — LEVOTHYROXINE SODIUM 25 MCG: 25 TABLET ORAL at 05:29

## 2020-09-02 RX ADMIN — SODIUM CHLORIDE 75 ML/HR: 0.9 INJECTION, SOLUTION INTRAVENOUS at 10:00

## 2020-09-02 RX ADMIN — VITAMIN D, TAB 1000IU (100/BT) 2000 UNITS: 25 TAB at 09:52

## 2020-09-02 RX ADMIN — LORAZEPAM 0.5 MG: 0.5 TABLET ORAL at 20:57

## 2020-09-02 RX ADMIN — TRAZODONE HYDROCHLORIDE 100 MG: 100 TABLET ORAL at 20:57

## 2020-09-02 RX ADMIN — CITALOPRAM HYDROBROMIDE 40 MG: 20 TABLET ORAL at 09:52

## 2020-09-02 RX ADMIN — CEFTRIAXONE SODIUM 1000 MG: 10 INJECTION, POWDER, FOR SOLUTION INTRAVENOUS at 11:32

## 2020-09-02 RX ADMIN — BACLOFEN 10 MG: 10 TABLET ORAL at 20:57

## 2020-09-02 RX ADMIN — OYSTER SHELL CALCIUM WITH VITAMIN D 1 TABLET: 500; 200 TABLET, FILM COATED ORAL at 17:44

## 2020-09-02 RX ADMIN — ENOXAPARIN SODIUM 40 MG: 100 INJECTION SUBCUTANEOUS at 09:52

## 2020-09-02 RX ADMIN — CEFEPIME HYDROCHLORIDE 1000 MG: 1 INJECTION, POWDER, FOR SOLUTION INTRAMUSCULAR; INTRAVENOUS at 02:08

## 2020-09-02 RX ADMIN — OXYBUTYNIN CHLORIDE 5 MG: 5 TABLET, EXTENDED RELEASE ORAL at 09:53

## 2020-09-02 RX ADMIN — INSULIN LISPRO 1 UNITS: 100 INJECTION, SOLUTION INTRAVENOUS; SUBCUTANEOUS at 14:33

## 2020-09-02 RX ADMIN — OXYBUTYNIN CHLORIDE 5 MG: 5 TABLET, EXTENDED RELEASE ORAL at 17:45

## 2020-09-02 RX ADMIN — ARIPIPRAZOLE 30 MG: 10 TABLET ORAL at 20:58

## 2020-09-02 RX ADMIN — BACLOFEN 10 MG: 10 TABLET ORAL at 09:53

## 2020-09-02 RX ADMIN — FERROUS SULFATE TAB 325 MG (65 MG ELEMENTAL FE) 325 MG: 325 (65 FE) TAB at 09:53

## 2020-09-02 RX ADMIN — FERROUS SULFATE TAB 325 MG (65 MG ELEMENTAL FE) 325 MG: 325 (65 FE) TAB at 17:45

## 2020-09-02 RX ADMIN — OXYBUTYNIN CHLORIDE 5 MG: 5 TABLET, EXTENDED RELEASE ORAL at 20:57

## 2020-09-02 RX ADMIN — VALPROIC ACID 500 MG: 250 SOLUTION ORAL at 09:52

## 2020-09-02 RX ADMIN — VALPROIC ACID 500 MG: 250 SOLUTION ORAL at 17:41

## 2020-09-02 RX ADMIN — ACETAMINOPHEN 650 MG: 325 TABLET, FILM COATED ORAL at 00:05

## 2020-09-02 RX ADMIN — INSULIN LISPRO 1 UNITS: 100 INJECTION, SOLUTION INTRAVENOUS; SUBCUTANEOUS at 17:48

## 2020-09-02 RX ADMIN — INSULIN LISPRO 1 UNITS: 100 INJECTION, SOLUTION INTRAVENOUS; SUBCUTANEOUS at 11:34

## 2020-09-02 RX ADMIN — ACETAMINOPHEN 650 MG: 325 TABLET, FILM COATED ORAL at 06:01

## 2020-09-02 RX ADMIN — BACLOFEN 10 MG: 10 TABLET ORAL at 17:44

## 2020-09-02 RX ADMIN — PRAVASTATIN SODIUM 40 MG: 40 TABLET ORAL at 17:44

## 2020-09-02 RX ADMIN — SODIUM CHLORIDE 500 ML: 0.9 INJECTION, SOLUTION INTRAVENOUS at 16:38

## 2020-09-02 RX ADMIN — OYSTER SHELL CALCIUM WITH VITAMIN D 1 TABLET: 500; 200 TABLET, FILM COATED ORAL at 09:52

## 2020-09-02 RX ADMIN — SODIUM CHLORIDE 75 ML/HR: 0.9 INJECTION, SOLUTION INTRAVENOUS at 18:43

## 2020-09-02 NOTE — UTILIZATION REVIEW
Notification of Inpatient Admission/Inpatient Authorization Request   This is a Notification of Inpatient Admission for [address]  Be advised that this patient was admitted to our facility under Inpatient Status  Contact Ignacio Rasmussen at [phone] for additional admission information  [facility] DEPT  DEDICATED [phone]  Patient Name:   Lb Morris   YOB: 1960       [address]   [address]:   [address]  Tax ID: [id number]  NPI: [id number] Attending Provider/NPI:  Address:  Phone: Phillip Bradford [facility] [[id number]]  Same as the facility  [phone]   Place of Service Code: 24 Place of Service Name:  [address] [place] [address]   Start Date: 9/1/20 1509     Discharge Date & Time: No discharge date for patient encounter  Type of Admission: Inpatient Status Discharge Disposition   (if discharged): Home with [address] [facility]   Patient Diagnoses: UTI (urinary tract infection) [N39 0]  Fever [R50 9]  History of cerebral palsy [Z86 69]  Severe sepsis ([address] ) [A41 9, R65 20]  Consolidation of right lower lobe of lung ([address] ) [J18 1]     Orders: Admission Orders (From admission, onward)     Ordered        09/01/20 1509  Inpatient Admission  Once                    Assigned Utilization Review Contact: Ignacio Rasmussen  Utilization   Network Utilization Review Department  Phone: [phone]; Fax [phone]  Email: [email or web address]   ATTENTION PAYERS: Please call the assigned Utilization  directly with any questions or concerns ALL voicemails in the department are confidential  Send all requests for admission clinical reviews, approved or denied determinations and any other requests to dedicated fax number belonging to the campus where the patient is receiving treatment

## 2020-09-02 NOTE — UTILIZATION REVIEW
Initial Clinical Review    Admission: Date/Time/Statement:   Admission Orders (From admission, onward)     Ordered        09/01/20 1509  Inpatient Admission  Once                   Orders Placed This Encounter   Procedures    Inpatient Admission     Standing Status:   Standing     Number of Occurrences:   1     Order Specific Question:   Admitting Physician     Answer:   Bong Carter     Order Specific Question:   Level of Care     Answer:   Med Surg [16]     Order Specific Question:   Estimated length of stay     Answer:   More than 2 Midnights     Order Specific Question:   Certification     Answer:   I certify that inpatient services are medically necessary for this patient for a duration of greater than two midnights  See H&P and MD Progress Notes for additional information about the patient's course of treatment  ED Arrival Information     Expected Arrival Acuity Means of Arrival Escorted By Service Admission Type    - 9/1/2020 10:10 Urgent Ambulance [facility] General Medicine Urgent    Arrival Complaint    -        Chief Complaint   Patient presents with    Fever - 9 weeks to 74 years     sent from [facility] by ems for fever that started this morning, recently inpatient with new dx of dysphagia  Assessment/Plan: this is a 64year old female from [facility] to ED via ems admitted inpatient due to sepsis suspect secondary to UTI, rule out pneumonia  History of cerebral palsy, frequent UTI on daily macrobid, DM, hypothyroid and dysphagia   Presented due to fever to 101 2 today and started with low grade yesterday  About 3 days ago started with episodes of  profuse diarrhea  On exam decreased breath sounds  Large amount of brown colored loose stool in diaper  Contractures and tremors  UA innumerable bacteria, small leukocytes, positive nitrite  Lactic acid 4 9  Wbc 11 60  H&H 11 1/33 9  Blood cultures done  CT abdomen with possible infiltrate     In the ED IVF bolus given and IV antibiotics  Plan is continued IVF and IV antibiotics  Dysphagia diet  On 9/2/2020 Patient more focal  Remains febrile to 102 9 this am    On exam slight systolic murmur  Spasticity   Wbc 13 6  Transitioned from IV cefepime to IV ceftriaxone  Stools for C diff, ova and parasite ordered  Blood and urine cultures in progress  ED Triage Vitals [09/01/20 1100]   Temperature Pulse Respirations Blood Pressure SpO2   100 2 °F (37 9 °C) 88 18 (!) 85/57 93 %      Temp Source Heart Rate Source Patient Position - Orthostatic VS BP Location FiO2 (%)   Oral Monitor Lying Right arm --      Pain Score       --          Wt Readings from Last 1 Encounters:   08/05/20 42 kg (92 lb 9 5 oz)     Additional Vital Signs:  09/02/20 0700   98 6 °F (37 °C)   82   16   129/80   91 %   Nasal cannula   Lying    09/02/20 0533   102 9 °F (39 4 °C)Abnormal                        09/01/20 2322   100 6 °F (38 1 °C)Abnormal                        09/01/20 2240   101 °F (38 3 °C)Abnormal                        09/01/20 2100   100 5 °F (38 1 °C)   80   14   128/83   90 %   Nasal cannula   Lying    09/01/20 1746   98 4 °F (36 9 °C)   118Abnormal        155/82   92 %   None (Room air)            Pertinent Labs/Diagnostic Test Results:   9/1/2020 CT abdomen No intra-abdominal fluid collection   No bowel obstruction   Postsurgical changes from prior segmental colonic resection   Rounded 2 2 cm calcific density seen within the right side of the transverse colon may be due to enterolith    Groundglass density left base with the areas of basilar consolidation at the right lung base may be acute or chronic   May represent acute infiltrate or nonresolved previously noted infiltrate   Follow-up chest CT suggested in 2 months   Thickened urinary bladder wall, correlate with urinary evaluation for cystitis/urinary tract infection    9/1/2020 CxR - No acute cardiopulmonary disease   No change from prior  Results from last 7 days   Lab Units 09/02/20  0535 09/01/20  1244   WBC Thousand/uL 13 62* 11 60*   HEMOGLOBIN g/dL 9 5* 11 1*   HEMATOCRIT % 28 6* 33 9*   PLATELETS Thousands/uL 140* 184   NEUTROS ABS Thousands/µL 9 05* 7 45         Results from last 7 days   Lab Units 09/02/20  0535 09/01/20  1244   SODIUM mmol/L 142 139   POTASSIUM mmol/L 3 8 4 1   CHLORIDE mmol/L 108 101   CO2 mmol/L 28 31   ANION GAP mmol/L 6 7   BUN mg/dL 16 12   CREATININE mg/dL 1 05 0 98   EGFR ml/min/1 73sq m 59 64   CALCIUM mg/dL 8 0* 8 9   MAGNESIUM mg/dL  --  1 6     Results from last 7 days   Lab Units 09/02/20  0535 09/01/20  1244   AST U/L 10 10   ALT U/L 10* 12   ALK PHOS U/L 56 68   TOTAL PROTEIN g/dL 5 2* 6 1*   ALBUMIN g/dL 1 6* 2 1*   TOTAL BILIRUBIN mg/dL 0 11* 0 10*     Results from last 7 days   Lab Units 09/02/20  0757 09/01/20  2050 09/01/20  1945 09/01/20  1816   POC GLUCOSE mg/dl 93 171* 142* 149*     Results from last 7 days   Lab Units 09/02/20  0535 09/01/20  1244   GLUCOSE RANDOM mg/dL 101 164*     Results from last 7 days   Lab Units 09/01/20  1244   PROTIME seconds 13 1   INR  0 98   PTT seconds 23     Results from last 7 days   Lab Units 09/02/20  0535 09/01/20  1244   PROCALCITONIN ng/ml 4 49* 0 19     Results from last 7 days   Lab Units 09/01/20  1820 09/01/20  1244   LACTIC ACID mmol/L 1 0 4 9*     Results from last 7 days   Lab Units 09/01/20  1316   CLARITY UA  Clear   COLOR UA  Yellow   SPEC GRAV UA  1 015   PH UA  7 5   GLUCOSE UA mg/dl Negative   KETONES UA mg/dl Negative   BLOOD UA  Trace-Intact*   PROTEIN UA mg/dl Negative   NITRITE UA  Positive*   BILIRUBIN UA  Negative   UROBILINOGEN UA E U /dl 0 2   LEUKOCYTES UA  Small*   WBC UA /hpf 10-20*   RBC UA /hpf 1-2*   BACTERIA UA /hpf Innumerable*   EPITHELIAL CELLS WET PREP /hpf None Seen     Results from last 7 days   Lab Units 09/01/20  1244   BLOOD CULTURE  Received in Microbiology Lab  Culture in Progress       09/01/2020 1316  09/02/2020 1205  Urine culture [[id number]]    (Abnormal)    Urine, Straight Cath     Preliminary result  Component  Value    Urine Culture  >100,000 cfu/ml Klebsiella-Enterobacter  group          ED Treatment:   Medication Administration from 09/01/2020 1010 to 09/01/2020 1743       Date/Time Order Dose Route Action Comments     09/01/2020 1433 cefepime (MAXIPIME) 2 g/50 mL dextrose IVPB 2,000 mg Intravenous New Bag      09/01/2020 1314 sodium chloride 0 9 % bolus 1,250 mL 1,250 mL Intravenous New Bag no IV access - provider aware        Past Medical History:   Diagnosis Date    Adjustment disorder     Altered mental status 12/11/2015    Anemia     Bipolar 1 disorder ([facility] )     Cerebral palsy ([facility] )     Chronic hypernatremia 2/6/2016    [facility] ) 1/19/2016    Closed left hip fracture (HCC)     no surgery    Constipation     Dehydration 2/20/2016    Diabetes mellitus ([facility] )     Disease of thyroid gland     Diverticulosis     Fracture of multiple ribs of right side     Hip fracture ([facility] ) 07/26/2015    left    Hyperlipidemia     Hypernatremia 12/28/2018    Hypotension     Impulse control disorder     Incontinence     Intellectual disability due to developmental disorder, unspecified     Microalbuminuria     Osteopathia     Osteoporosis     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HCC)     Thrombocytopenia ([facility] ) 7/31/2015     Present on Admission:   Severe sepsis (HCC)   Cerebral palsy (HCC)   Acute cystitis without hematuria   Bipolar disorder ([facility] )   Dysphagia   Gastroesophageal reflux disease without esophagitis   Iron deficiency anemia   Hypothyroidism   Type 2 diabetes mellitus, without long-term current use of insulin (HCC)      Admitting Diagnosis: UTI (urinary tract infection) [N39 0]  Fever [R50 9]  History of cerebral palsy [Z86 69]  Severe sepsis (HCC) [A41 9, R65 20]  Consolidation of right lower lobe of lung ([facility] ) [J18 1]  Age/Sex: 64 y o  female  Admission Orders: 9/1/2020 1509 Inpatient Scheduled Medications:  ARIPiprazole, 30 mg, Oral, Q24H  baclofen, 10 mg, Oral, TID  calcium carbonate-vitamin D, 1 tablet, Oral, BID With Meals  cefTRIAXone, 1,000 mg, Intravenous, Q24H  cholecalciferol, 2,000 Units, Oral, Daily  citalopram, 40 mg, Oral, QAM  enoxaparin, 40 mg, Subcutaneous, Daily  ferrous sulfate, 325 mg, Oral, BID With Meals  insulin lispro, 1-5 Units, Subcutaneous, 4 times day  levothyroxine, 25 mcg, Oral, Early Morning  LORazepam, 0 5 mg, Oral, HS  oxybutynin, 5 mg, Oral, TID  pantoprazole, 20 mg, Oral, Early Morning  pravastatin, 40 mg, Oral, Daily With Dinner  traZODone, 100 mg, Oral, HS  valproic acid, 500 mg, Oral, BID  cefepime (MAXIPIME) 1,000 mg in dextrose 5 % 50 mL IVPB    Dose: 1,000 mg  Freq: Every 12 hours Route: IV  Last Dose: 1,000 mg (09/02/20 0208)  Start: 09/02/20 0200 End: 09/02/20 0921    Continuous IV Infusions:  sodium chloride, 75 mL/hr, Intravenous, Continuous      PRN Meds:  acetaminophen, 650 mg, Oral, Q6H PRN- used x 2   ammonium lactate, , Topical, BID PRN  ondansetron, 4 mg, Intravenous, Q6H PRN  sodium chloride (PF), 3 mL, Intravenous, Q1H PRN        Network Utilization Review Department  [email or web address]  ATTENTION: Please call with any questions or concerns to [phone] and carefully listen to the prompts so that you are directed to the right person  All voicemails are confidential   Diamond Horton all requests for admission clinical reviews, approved or denied determinations and any other requests to dedicated fax number below belonging to the campus where the patient is receiving treatment   List of dedicated fax numbers for the Facilities:  FACILITY NAME UR FAX NUMBER   ADMISSION DENIALS (Administrative/Medical Necessity) [phone]   [address] [place] [address] (Maternity/NICU/Pediatrics) [phone]   Cleve Miguel [phone]   Jerrica Espinoza [address] [facility] [phone]   Berna Keenan [facility] [address] [facility] [phone]   Katelynn Leaver [address] [phone] [address] [facility]  [phone] [address] [phone]

## 2020-09-02 NOTE — PROGRESS NOTES
Progress Note - Rigo Pedersen 1960, 64 y o  female MRN: [id number]    Unit/Bed#: S -01 Encounter: [id number]    Primary Care Provider: Mariann Cook DO   Date and time admitted to hospital: 9/1/2020 10:10 AM        * Severe sepsis [facility])  Assessment & Plan  82-year-old female with history of recurrent UTIs on daily nitrofurantoin, cerebral palsy, bipolar, GERD, hemicolectomy for volvulus, presents from group home with caregiver with 3 days of intermittent fevers, T-max 101 2°, increased loose to watery, nonbloody bowel movements  Patient meets severe sepsis criteria likely secondary to UTI  Cannot rule out pneumonia or C diff at this point  · Lactic acid: 4 9  · Tachycardia greater than 90  · BP 89/57, however per caregiver, this is close to baseline  · UA/Urine Micro = bacteria innumerable, nitrite+ leuk+  · Leukocytosis, WBC = 11 6  · CXR:  Unremarkable  · CT:   · Groundglass density left base with the areas of basilar consolidation at the right lung base may be acute or chronic  May represent acute infiltrate or nonresolved previously noted infiltrate    · Thickened urinary bladder wall     Patient continues to be nonverbal   Patient was febrile to 102 9 (T-max) this morning and elevated leukocytosis  · WBC:  13 6 to from 11 6  · Eosinophilia:  1 7 from 1 2  · No rash  · Rule out parasitic infection    Plan:  · Transition to IV ceftriaxone 1 g q 12h from IV cefepime 1 g q 12h  · Continue IVF normal saline 75 cc/hr  · Follow-up C diff, ova parasites  · Follow-up urine and blood cultures  · Follow-up COVID test  · Respiratory protocol  · Repeat CBC with diff tomorrow      Acute cystitis without hematuria  Assessment & Plan  POA, patient is on daily Macrodantin as patient has a history of colonizer with Klebsiella, urine micro and urinalysis show UTI    · CT:  Thickened bladder wall likely due to acute cystitis  · History of UTI, Enterobacter      Plan:  · Start IV ceftriaxone 1 g Q 12  · Patient has history UTIs with Enterobacter under susceptible to ceftriaxone  · Hold home Macrodantin  · Follow-up urine cultures    Cerebral palsy [facility])  Assessment & Plan  POA, patient mentates at baseline, has muscular rigidity secondary to cerebral palsy  · Baseline spastic quadriparesis and intellectual disability    Plan:  · Continue baclofen 10 mg t i d  For spasticity    Dysphagia  Assessment & Plan  POA, the Video Barium Study performed on 08/04/2020 showed "significant oropharyngeal dysphagia characterized primarily by slow, weak tongue "pump" transfer, delayed swallowing initiation, incomplete airway protection and WITH NO RESPONSE TO PENETRATION OR TO PASSIVE ASPIRATION OVER TIME TODAY"    Plan:  Modified dysphagia diet, pureed, pudding thick    Bipolar disorder (HCC)  Assessment & Plan  POA, bipolar disorder controlled with Abilify 30 mg q h s  and Depakote 250 mg/5ml 10 mL p o  B i d  Plan:  Continue home medications    Gastroesophageal reflux disease without esophagitis  Assessment & Plan  POA, GERD controlled on PPI    Plan:  Protonix 20 mg q d  Hypothyroidism  Assessment & Plan  POA, controlled, Synthroid 25 mcg q a m      Plan:  · Continue Synthroid   · Follow-up TSH    Iron deficiency anemia  Assessment & Plan  POA, patient has history of iron deficiency anemia and is on home ferrous sulfate 325 mg b i d   · Hg = 11 1      Plan:  · Continue iron supplementation  · Follow up folate and B12    Type 2 diabetes mellitus, without long-term current use of insulin [facility])  Assessment & Plan  Lab Results   Component Value Date    HGBA1C 5 8 (H) 08/01/2020       Recent Labs     09/01/20  1816 09/01/20  1945 09/01/20 2050 09/02/20  0757   POCGLU 149* 142* 171* 93       Blood Sugar Average: Last 72 hrs:    Plan:  · Hold home metformin  · Insulin sliding scale and Accu-Chek  · Diabetic          VTE Pharmacologic Prophylaxis:   Pharmacologic: Enoxaparin (Lovenox)  Mechanical VTE Prophylaxis in Place: Yes    Discussions with Specialists or Other Care Team Provider:   Nursing  Follow up with CM    Education and Discussions with Family / Patient:   Horacio Deem, caregiver, no answer- will call again    Current Length of Stay: 1 day(s)    Current Patient Status: Inpatient     Discharge Plan / Estimated Discharge Date:  24-48 hours tentative    Code Status: Level 1 - Full Code      Subjective: This morning, patient continues have limited verbal skills  However, patient is more focal during examination  Objective:     Vitals:   Temp (24hrs), Av 3 °F (37 9 °C), Min:98 4 °F (36 9 °C), Max:102 9 °F (39 4 °C)    Temp:  [98 4 °F (36 9 °C)-102 9 °F (39 4 °C)] 98 6 °F (37 °C)  HR:  [] 82  Resp:  [14-18] 16  BP: ()/(57-85) 129/80  SpO2:  [90 %-93 %] 91 %  There is no height or weight on file to calculate BMI  Input and Output Summary (last 24 hours): Intake/Output Summary (Last 24 hours) at 2020 1041  Last data filed at 2020 1000  Gross per 24 hour   Intake 1161 25 ml   Output 200 ml   Net 961 25 ml       Physical Exam:     Physical Exam  Vitals signs and nursing note reviewed  Constitutional:       Appearance: She is not ill-appearing (Chronic)  HENT:      Head: Normocephalic and atraumatic  Right Ear: External ear normal       Left Ear: External ear normal       Nose: Nose normal       Mouth/Throat:      Mouth: Mucous membranes are moist    Eyes:      Conjunctiva/sclera: Conjunctivae normal       Pupils: Pupils are equal, round, and reactive to light  Neck:      Musculoskeletal: Normal range of motion and neck supple  Cardiovascular:      Rate and Rhythm: Normal rate and regular rhythm  Heart sounds: Murmur (Slight systolic murmur) present  Pulmonary:      Effort: Pulmonary effort is normal       Comments: Unable to fully assess breath sounds the patient does not follow commands  Abdominal:      General: Abdomen is flat   Bowel sounds are normal       Palpations: Abdomen is soft  Tenderness: There is no abdominal tenderness  Musculoskeletal:         General: No swelling or tenderness  Right lower leg: No edema  Left lower leg: No edema  Comments: Spasticity   Skin:     General: Skin is warm and dry  Capillary Refill: Capillary refill takes less than 2 seconds  Findings: No rash  Neurological:      General: No focal deficit present  Mental Status: She is alert  Additional Data:     Labs:    Results from last 7 days   Lab Units 09/02/20  0535   WBC Thousand/uL 13 62*   HEMOGLOBIN g/dL 9 5*   HEMATOCRIT % 28 6*   PLATELETS Thousands/uL 140*   NEUTROS PCT % 66   LYMPHS PCT % 13*   MONOS PCT % 7   EOS PCT % 13*     Results from last 7 days   Lab Units 09/02/20  0535   POTASSIUM mmol/L 3 8   CHLORIDE mmol/L 108   CO2 mmol/L 28   BUN mg/dL 16   CREATININE mg/dL 1 05   CALCIUM mg/dL 8 0*   ALK PHOS U/L 56   ALT U/L 10*   AST U/L 10     Results from last 7 days   Lab Units 09/01/20  1244   INR  0 98       * I Have Reviewed All Lab Data Listed Above  * Additional Pertinent Lab Tests Reviewed: All Labs Within Last 24 Hours Reviewed    Imaging:    Imaging Reports Reviewed Today Include:  CXR, CT scan  Imaging Personally Reviewed by Myself Includes:  CXR  Recent Cultures (last 7 days):     Results from last 7 days   Lab Units 09/01/20  1244   BLOOD CULTURE  Received in Microbiology Lab  Culture in Progress         Last 24 Hours Medication List:   Current Facility-Administered Medications   Medication Dose Route Frequency Provider Last Rate    acetaminophen  650 mg Oral Q6H PRN Anna Marquez MD      ammonium lactate   Topical BID PRN Anna Marquez MD      ARIPiprazole  30 mg Oral Q24H Anna Marquez MD      baclofen  10 mg Oral TID Anna Marquez MD      calcium carbonate-vitamin D  1 tablet Oral BID With Meals Anna Marquez MD      cefTRIAXone  1,000 mg Intravenous Q24H MD Vel Donaldson cholecalciferol  2,000 Units Oral Daily Jeancarlos Motley MD      citalopram  40 mg Oral QAM Jeancarlos Motley MD      enoxaparin  40 mg Subcutaneous Daily Jeancarlos Motley MD      ferrous sulfate  325 mg Oral BID With Meals Jeancarlos Motley MD      insulin lispro  1-5 Units Subcutaneous 4 times day Jeancarlos Motley MD      levothyroxine  25 mcg Oral Early Morning Jeancarlos Motley MD      LORazepam  0 5 mg Oral HS Jeancarlos Motley MD      ondansetron  4 mg Intravenous Q6H PRN Jeancarlos Motley MD      oxybutynin  5 mg Oral TID Jeancarlos Motley MD      pantoprazole  20 mg Oral Early Morning Jeancarlos Motley MD      pravastatin  40 mg Oral Daily With Ludmila Graf MD      sodium chloride (PF)  3 mL Intravenous Q1H PRN Jeancarlos Motley MD      sodium chloride  75 mL/hr Intravenous Continuous Jeancarlos Motley MD 75 mL/hr (09/02/20 1000)    traZODone  100 mg Oral HS Jeancarlos Motley MD      valproic acid  500 mg Oral BID Jeancarlos Motley MD          Today, Patient Was Seen By: Jeancarlos Motley MD    ** Please Note: This note has been constructed using a voice recognition system   **

## 2020-09-02 NOTE — ED ATTENDING ATTESTATION
9/1/2020  IJaskaran MD, saw and evaluated the patient  I have discussed the patient with the resident/non-physician practitioner and agree with the resident's/non-physician practitioner's findings, Plan of Care, and MDM as documented in the resident's/non-physician practitioner's note, except where noted  All available labs and Radiology studies were reviewed  I was present for key portions of any procedure(s) performed by the resident/non-physician practitioner and I was immediately available to provide assistance  At this point I agree with the current assessment done in the Emergency Department  I have conducted an independent evaluation of this patient a history and physical is as follows:    ED Course       62-year-old female presenting with fever and hypotension  History of frequent UTIs and aspirations  Agree with advanced practitioner workup  Will evaluate for sepsis  Antibiotics    Admission to hospital    Critical Care Time  Procedures

## 2020-09-03 PROBLEM — R63.6 UNDERWEIGHT: Status: ACTIVE | Noted: 2020-09-03

## 2020-09-03 LAB
ANION GAP SERPL CALCULATED.3IONS-SCNC: 7 MMOL/L (ref 4–13)
ATRIAL RATE: 170 BPM
BACTERIA UR CULT: ABNORMAL
BASOPHILS # BLD AUTO: 0.05 THOUSANDS/ΜL (ref 0–0.1)
BASOPHILS NFR BLD AUTO: 0 % (ref 0–1)
BUN SERPL-MCNC: 16 MG/DL (ref 5–25)
CALCIUM SERPL-MCNC: 7.7 MG/DL (ref 8.3–10.1)
CHLORIDE SERPL-SCNC: 115 MMOL/L (ref 100–108)
CO2 SERPL-SCNC: 26 MMOL/L (ref 21–32)
CREAT SERPL-MCNC: 0.85 MG/DL (ref 0.6–1.3)
EOSINOPHIL # BLD AUTO: 1.86 THOUSAND/ΜL (ref 0–0.61)
EOSINOPHIL NFR BLD AUTO: 17 % (ref 0–6)
ERYTHROCYTE [DISTWIDTH] IN BLOOD BY AUTOMATED COUNT: 13.2 % (ref 11.6–15.1)
GFR SERPL CREATININE-BSD FRML MDRD: 76 ML/MIN/1.73SQ M
GLUCOSE SERPL-MCNC: 171 MG/DL (ref 65–140)
GLUCOSE SERPL-MCNC: 208 MG/DL (ref 65–140)
GLUCOSE SERPL-MCNC: 299 MG/DL (ref 65–140)
GLUCOSE SERPL-MCNC: 82 MG/DL (ref 65–140)
GLUCOSE SERPL-MCNC: 89 MG/DL (ref 65–140)
HCT VFR BLD AUTO: 26.2 % (ref 34.8–46.1)
HGB BLD-MCNC: 8.6 G/DL (ref 11.5–15.4)
IMM GRANULOCYTES # BLD AUTO: 0.21 THOUSAND/UL (ref 0–0.2)
IMM GRANULOCYTES NFR BLD AUTO: 2 % (ref 0–2)
LYMPHOCYTES # BLD AUTO: 2.42 THOUSANDS/ΜL (ref 0.6–4.47)
LYMPHOCYTES NFR BLD AUTO: 22 % (ref 14–44)
MCH RBC QN AUTO: 35.7 PG (ref 26.8–34.3)
MCHC RBC AUTO-ENTMCNC: 32.8 G/DL (ref 31.4–37.4)
MCV RBC AUTO: 109 FL (ref 82–98)
MONOCYTES # BLD AUTO: 0.62 THOUSAND/ΜL (ref 0.17–1.22)
MONOCYTES NFR BLD AUTO: 6 % (ref 4–12)
NEUTROPHILS # BLD AUTO: 6 THOUSANDS/ΜL (ref 1.85–7.62)
NEUTS SEG NFR BLD AUTO: 53 % (ref 43–75)
NRBC BLD AUTO-RTO: 0 /100 WBCS
PLATELET # BLD AUTO: 118 THOUSANDS/UL (ref 149–390)
PMV BLD AUTO: 10.9 FL (ref 8.9–12.7)
POTASSIUM SERPL-SCNC: 3.8 MMOL/L (ref 3.5–5.3)
QRS AXIS: 81 DEGREES
QRSD INTERVAL: 70 MS
QT INTERVAL: 358 MS
QTC INTERVAL: 430 MS
RBC # BLD AUTO: 2.41 MILLION/UL (ref 3.81–5.12)
SODIUM SERPL-SCNC: 148 MMOL/L (ref 136–145)
T WAVE AXIS: 74 DEGREES
VENTRICULAR RATE: 87 BPM
WBC # BLD AUTO: 11.16 THOUSAND/UL (ref 4.31–10.16)

## 2020-09-03 PROCEDURE — 80048 BASIC METABOLIC PNL TOTAL CA: CPT | Performed by: PSYCHIATRY & NEUROLOGY

## 2020-09-03 PROCEDURE — 99232 SBSQ HOSP IP/OBS MODERATE 35: CPT | Performed by: INTERNAL MEDICINE

## 2020-09-03 PROCEDURE — 85025 COMPLETE CBC W/AUTO DIFF WBC: CPT | Performed by: PSYCHIATRY & NEUROLOGY

## 2020-09-03 PROCEDURE — 93010 ELECTROCARDIOGRAM REPORT: CPT | Performed by: INTERNAL MEDICINE

## 2020-09-03 PROCEDURE — 82948 REAGENT STRIP/BLOOD GLUCOSE: CPT

## 2020-09-03 RX ORDER — SODIUM CHLORIDE 450 MG/100ML
50 INJECTION, SOLUTION INTRAVENOUS CONTINUOUS
Status: DISCONTINUED | OUTPATIENT
Start: 2020-09-03 | End: 2020-09-04

## 2020-09-03 RX ADMIN — LORAZEPAM 0.5 MG: 0.5 TABLET ORAL at 20:12

## 2020-09-03 RX ADMIN — BACLOFEN 10 MG: 10 TABLET ORAL at 20:12

## 2020-09-03 RX ADMIN — CEFTRIAXONE SODIUM 1000 MG: 10 INJECTION, POWDER, FOR SOLUTION INTRAVENOUS at 09:49

## 2020-09-03 RX ADMIN — ARIPIPRAZOLE 30 MG: 10 TABLET ORAL at 20:13

## 2020-09-03 RX ADMIN — BACLOFEN 10 MG: 10 TABLET ORAL at 09:35

## 2020-09-03 RX ADMIN — OYSTER SHELL CALCIUM WITH VITAMIN D 1 TABLET: 500; 200 TABLET, FILM COATED ORAL at 09:35

## 2020-09-03 RX ADMIN — ENOXAPARIN SODIUM 40 MG: 100 INJECTION SUBCUTANEOUS at 09:34

## 2020-09-03 RX ADMIN — LEVOTHYROXINE SODIUM 25 MCG: 25 TABLET ORAL at 05:40

## 2020-09-03 RX ADMIN — VALPROIC ACID 500 MG: 250 SOLUTION ORAL at 09:34

## 2020-09-03 RX ADMIN — PRAVASTATIN SODIUM 40 MG: 40 TABLET ORAL at 17:37

## 2020-09-03 RX ADMIN — TRAZODONE HYDROCHLORIDE 100 MG: 100 TABLET ORAL at 20:12

## 2020-09-03 RX ADMIN — SODIUM CHLORIDE 50 ML/HR: 0.45 INJECTION, SOLUTION INTRAVENOUS at 10:49

## 2020-09-03 RX ADMIN — VITAMIN D, TAB 1000IU (100/BT) 2000 UNITS: 25 TAB at 09:34

## 2020-09-03 RX ADMIN — OXYBUTYNIN CHLORIDE 5 MG: 5 TABLET, EXTENDED RELEASE ORAL at 17:38

## 2020-09-03 RX ADMIN — PANTOPRAZOLE SODIUM 20 MG: 20 TABLET, DELAYED RELEASE ORAL at 05:40

## 2020-09-03 RX ADMIN — OYSTER SHELL CALCIUM WITH VITAMIN D 1 TABLET: 500; 200 TABLET, FILM COATED ORAL at 17:37

## 2020-09-03 RX ADMIN — BACLOFEN 10 MG: 10 TABLET ORAL at 17:37

## 2020-09-03 RX ADMIN — VALPROIC ACID 500 MG: 250 SOLUTION ORAL at 17:38

## 2020-09-03 RX ADMIN — FERROUS SULFATE TAB 325 MG (65 MG ELEMENTAL FE) 325 MG: 325 (65 FE) TAB at 09:35

## 2020-09-03 RX ADMIN — CITALOPRAM HYDROBROMIDE 40 MG: 20 TABLET ORAL at 09:35

## 2020-09-03 RX ADMIN — INSULIN LISPRO 2 UNITS: 100 INJECTION, SOLUTION INTRAVENOUS; SUBCUTANEOUS at 21:47

## 2020-09-03 RX ADMIN — FERROUS SULFATE TAB 325 MG (65 MG ELEMENTAL FE) 325 MG: 325 (65 FE) TAB at 17:38

## 2020-09-03 RX ADMIN — OXYBUTYNIN CHLORIDE 5 MG: 5 TABLET, EXTENDED RELEASE ORAL at 09:35

## 2020-09-03 RX ADMIN — OXYBUTYNIN CHLORIDE 5 MG: 5 TABLET, EXTENDED RELEASE ORAL at 20:12

## 2020-09-03 RX ADMIN — INSULIN LISPRO 1 UNITS: 100 INJECTION, SOLUTION INTRAVENOUS; SUBCUTANEOUS at 11:20

## 2020-09-03 NOTE — ASSESSMENT & PLAN NOTE
61-year-old female with history of recurrent UTIs on daily nitrofurantoin, cerebral palsy, bipolar, GERD, hemicolectomy for volvulus, presents from group home with caregiver with 3 days of intermittent fevers, T-max 101 2°, increased loose to watery, nonbloody bowel movements  Patient meets severe sepsis criteria likely secondary to UTI  Cannot rule out pneumonia or C diff at this point  · Lactic acid: 4 9  · Tachycardia greater than 90  · BP 89/57, however per caregiver, this is close to baseline  · UA/Urine Micro = bacteria innumerable, nitrite+ leuk+  · Leukocytosis, WBC = 11 6  · CXR:  Unremarkable  · CT:   · Groundglass density left base with the areas of basilar consolidation at the right lung base may be acute or chronic  May represent acute infiltrate or nonresolved previously noted infiltrate    · Thickened urinary bladder wall     Patient continues to be nonverbal   Today, patient is clinically improved and has remained afebrile the past 24 hours    WBC: 11 16 from 13 6 and 11 6  · Urine culture: >100,000 Klebsiella varicolla   · COVID negative, Cdiff negative  · Eosinophilia:  1 86 from 1 7 and 1 2  · No rash  · Rule out parasitic infection    Plan:  · Continue IV ceftriaxone 1 g q 12h   · Continue IVF 1/2NS @ 50 cc/hr  · Transition from normal saline due to bump in sodium  · Follow-up ova parasites  · Follow-up blood cultures  · Respiratory protocol  · Repeat CBC with diff tomorrow

## 2020-09-03 NOTE — ASSESSMENT & PLAN NOTE
POA, controlled, Synthroid 25 mcg q a m  This morning, TSH WNL and controlled on home medications       Plan:  · Continue Synthroid

## 2020-09-03 NOTE — ASSESSMENT & PLAN NOTE
Lab Results   Component Value Date    HGBA1C 5 8 (H) 08/01/2020       Recent Labs     09/02/20  1413 09/02/20  2103 09/03/20  0754 09/03/20  1113   POCGLU 187* 125 89 208*       Blood Sugar Average: Last 72 hrs:    Plan:  · Hold home metformin  · Insulin sliding scale and Accu-Chek  · Diabetic

## 2020-09-03 NOTE — PROGRESS NOTES
Progress Note - Annie Butler 1960, 64 y o  female MRN: [id number]    Unit/Bed#: S -01 Encounter: [id number]    Primary Care Provider: Yoko Carter DO   Date and time admitted to hospital: 9/1/2020 10:10 AM        * Severe sepsis [facility])  Assessment & Plan  79-year-old female with history of recurrent UTIs on daily nitrofurantoin, cerebral palsy, bipolar, GERD, hemicolectomy for volvulus, presents from group home with caregiver with 3 days of intermittent fevers, T-max 101 2°, increased loose to watery, nonbloody bowel movements  Patient meets severe sepsis criteria likely secondary to UTI  Cannot rule out pneumonia or C diff at this point  · Lactic acid: 4 9  · Tachycardia greater than 90  · BP 89/57, however per caregiver, this is close to baseline  · UA/Urine Micro = bacteria innumerable, nitrite+ leuk+  · Leukocytosis, WBC = 11 6  · CXR:  Unremarkable  · CT:   · Groundglass density left base with the areas of basilar consolidation at the right lung base may be acute or chronic  May represent acute infiltrate or nonresolved previously noted infiltrate    · Thickened urinary bladder wall     Patient continues to be nonverbal   Today, patient is clinically improved and has remained afebrile the past 24 hours  WBC: 11 16 from 13 6 and 11 6  · Urine culture: >100,000 Klebsiella varicolla   · COVID negative, Cdiff negative  · Eosinophilia:  1 86 from 1 7 and 1 2  · No rash  · Rule out parasitic infection    Plan:  · Continue IV ceftriaxone 1 g q 12h   · Continue IVF 1/2NS @ 50 cc/hr  · Transition from normal saline due to bump in sodium  · Follow-up ova parasites  · Follow-up blood cultures  · Respiratory protocol  · Repeat CBC with diff tomorrow      Acute cystitis without hematuria  Assessment & Plan  POA, patient is on daily Macrodantin as patient has a history of colonizer with Klebsiella, urine micro and urinalysis show UTI    · CT:  Thickened bladder wall likely due to acute cystitis  · History of UTI, Enterobacter    This morning, patient's urine culture returned with greater than 100,000 Klebsiella varicola   Plan:  · Continue IV ceftriaxone 1 g Q 12  · Hold home Macrodantin    Underweight  Assessment & Plan  A POA, underweight adult as evidence by BMI of 19 35 with history cerebral palsy, dysphagia, spastic quadriplegia, noted to have gained weight since last admission  Patient requires continuation of pureed diet with pudding thick liquids, full assistance for meals and monitoring of p o  Intake  · 07/26/2020 weight 89 lb  · 01/21/2020 weight 107 lb    Plan:  · Continue modified dysphagia diet, pureed thick, pudding thick  · Consult nutrition/appreciate recommendations    Dysphagia  Assessment & Plan  POA, the Video Barium Study performed on 08/04/2020 showed "significant oropharyngeal dysphagia characterized primarily by slow, weak tongue "pump" transfer, delayed swallowing initiation, incomplete airway protection and WITH NO RESPONSE TO PENETRATION OR TO PASSIVE ASPIRATION OVER TIME TODAY"    Plan:  · Continue modified dysphagia diet, pureed, pudding thick  · Nutrition consult/appreciate recommendations    Cerebral palsy ([facility] )  Assessment & Plan  POA, patient mentates at baseline, has muscular rigidity secondary to cerebral palsy  · Baseline spastic quadriparesis and intellectual disability    Plan:  · Continue baclofen 10 mg t i d  For spasticity    Bipolar disorder ([facility] )  Assessment & Plan  POA, bipolar disorder controlled with Abilify 30 mg q h s  and Depakote 250 mg/5ml 10 mL p o  B i d  Plan:  Continue home medications    Gastroesophageal reflux disease without esophagitis  Assessment & Plan  POA, GERD controlled on PPI    Plan:  Protonix 20 mg q d  Hypothyroidism  Assessment & Plan  POA, controlled, Synthroid 25 mcg q a m  This morning, TSH WNL and controlled on home medications       Plan:  · Continue Synthroid       Iron deficiency anemia  Assessment & Plan  POA, patient has history of iron deficiency anemia and is on home ferrous sulfate 325 mg b i d   · Hg = 11 1    This morning, patient has macrocytic anemia and hemoglobin is 8 6 from 9 5 change in hemoglobin likely due to hemodilution  · Folate level and B12 level elevated  Plan:  · Continue iron supplementation  · Follow up folate and B12    Type 2 diabetes mellitus, without long-term current use of insulin [facility])  Assessment & Plan  Lab Results   Component Value Date    HGBA1C 5 8 (H) 2020       Recent Labs     20  1413 20  2103 20  0754 20  1113   POCGLU 187* 125 89 208*       Blood Sugar Average: Last 72 hrs:    Plan:  · Hold home metformin  · Insulin sliding scale and Accu-Chek  · Diabetic            VTE Pharmacologic Prophylaxis:   Pharmacologic: Enoxaparin (Lovenox)  Mechanical VTE Prophylaxis in Place: Yes    Discussions with Specialists or Other Care Team Provider:   Case management  Nursing staff    Education and Discussions with Family / Patient:  Spoke with Debbie Pozo, patient's nurse and group home    Current Length of Stay: 2 day(s)    Current Patient Status: Inpatient     Discharge Plan / Estimated Discharge Date:  Next Tuesday as patient's group home cannot receive her until Tuesday    Code Status: Level 1 - Full Code      Subjective: This morning, patient was lying comfortably in bed  Patient expressed that she was hungry by saying eat      Objective:     Vitals:   Temp (24hrs), Av 3 °F (36 8 °C), Min:98 °F (36 7 °C), Max:98 7 °F (37 1 °C)    Temp:  [98 °F (36 7 °C)-98 7 °F (37 1 °C)] 98 7 °F (37 1 °C)  HR:  [59-74] 59  Resp:  [18] 18  BP: (78-95)/(46-54) 86/47  SpO2:  [94 %-98 %] 94 %  There is no height or weight on file to calculate BMI  Input and Output Summary (last 24 hours):        Intake/Output Summary (Last 24 hours) at 9/3/2020 1410  Last data filed at 9/3/2020 1056  Gross per 24 hour   Intake 1110 ml   Output 1000 ml   Net 110 ml       Physical Exam: Physical Exam  Constitutional:       General: She is not in acute distress  Appearance: She is ill-appearing (Chronically)  HENT:      Head: Normocephalic  Right Ear: External ear normal       Left Ear: External ear normal       Nose: Nose normal       Mouth/Throat:      Mouth: Mucous membranes are moist    Eyes:      Conjunctiva/sclera: Conjunctivae normal       Pupils: Pupils are equal, round, and reactive to light  Cardiovascular:      Rate and Rhythm: Normal rate and regular rhythm  Heart sounds: Murmur present  Pulmonary:      Effort: Pulmonary effort is normal       Breath sounds: No wheezing  Abdominal:      General: Abdomen is flat  Bowel sounds are normal  There is no distension  Palpations: Abdomen is soft  Tenderness: There is no abdominal tenderness  Musculoskeletal:      Right lower leg: No edema  Left lower leg: No edema  Comments: facial spasticity   Skin:     General: Skin is warm and dry  Capillary Refill: Capillary refill takes less than 2 seconds  Coloration: Skin is not jaundiced  Neurological:      Mental Status: She is alert  Additional Data:     Labs:    Results from last 7 days   Lab Units 09/03/20  0536   WBC Thousand/uL 11 16*   HEMOGLOBIN g/dL 8 6*   HEMATOCRIT % 26 2*   PLATELETS Thousands/uL 118*   NEUTROS PCT % 53   LYMPHS PCT % 22   MONOS PCT % 6   EOS PCT % 17*     Results from last 7 days   Lab Units 09/03/20  0536 09/02/20  0535   POTASSIUM mmol/L 3 8 3 8   CHLORIDE mmol/L 115* 108   CO2 mmol/L 26 28   BUN mg/dL 16 16   CREATININE mg/dL 0 85 1 05   CALCIUM mg/dL 7 7* 8 0*   ALK PHOS U/L  --  56   ALT U/L  --  10*   AST U/L  --  10     Results from last 7 days   Lab Units 09/01/20  1244   INR  0 98       * I Have Reviewed All Lab Data Listed Above  * Additional Pertinent Lab Tests Reviewed:  All Labs Within Last 24 Hours Reviewed    Imaging:    Imaging Reports Reviewed Today Include:  None  Imaging Personally Reviewed by Myself Includes:  None  Recent Cultures (last 7 days):     Results from last 7 days   Lab Units 09/02/20  0004 09/01/20  1316 09/01/20  1244   BLOOD CULTURE   --   --  No Growth at 24 hrs     URINE CULTURE   --  >100,000 cfu/ml Klebsiella variicola*  --    C DIFF TOXIN B  Negative  --   --        Last 24 Hours Medication List:   Current Facility-Administered Medications   Medication Dose Route Frequency Provider Last Rate    acetaminophen  650 mg Oral Q6H PRN Kemper Babinski, MD      ammonium lactate   Topical BID PRN Kemper Babinski, MD      ARIPiprazole  30 mg Oral Q24H Kemper Babinski, MD      baclofen  10 mg Oral TID Kemper Babinski, MD      calcium carbonate-vitamin D  1 tablet Oral BID With Meals Kemper Babinski, MD      cefTRIAXone  1,000 mg Intravenous Q24H Jennifer Gan MD 1,000 mg (09/03/20 0949)    cholecalciferol  2,000 Units Oral Daily Kemper Babinski, MD      citalopram  40 mg Oral QAM Kemper Babinski, MD      enoxaparin  40 mg Subcutaneous Daily Kemper Babinski, MD      ferrous sulfate  325 mg Oral BID With Meals Kemper Babinski, MD      insulin lispro  1-5 Units Subcutaneous 4 times day Kemper Babinski, MD      levothyroxine  25 mcg Oral Early Morning Kemper Babinski, MD      LORazepam  0 5 mg Oral HS Kemper Babinski, MD      ondansetron  4 mg Intravenous Q6H PRN Kemper Babinski, MD      oxybutynin  5 mg Oral TID Kemper Babinski, MD      pantoprazole  20 mg Oral Early Morning Kemper Babinski, MD      pravastatin  40 mg Oral Daily With Isabel Vieira MD      sodium chloride (PF)  3 mL Intravenous Q1H PRN Kemper Babinski, MD      sodium chloride  50 mL/hr Intravenous Continuous Kemper Babinski, MD 50 mL/hr (09/03/20 1049)    traZODone  100 mg Oral HS Kemper Babinski, MD      valproic acid  500 mg Oral BID Kemper Babinski, MD          Today, Patient Was Seen By: Kemper Babinski, MD    ** Please Note: This note has been constructed using a voice recognition system   **

## 2020-09-03 NOTE — PLAN OF CARE
Problem: Potential for Falls  Goal: Patient will remain free of falls  Description: INTERVENTIONS:  - Assess patient frequently for physical needs  -  Identify cognitive and physical deficits and behaviors that affect risk of falls    -  Sandoval fall precautions as indicated by assessment   - Educate patient/family on patient safety including physical limitations  - Instruct patient to call for assistance with activity based on assessment  - Modify environment to reduce risk of injury  - Consider OT/PT consult to assist with strengthening/mobility  Outcome: Progressing     Problem: Prexisting or High Potential for Compromised Skin Integrity  Goal: Skin integrity is maintained or improved  Description: INTERVENTIONS:  - Identify patients at risk for skin breakdown  - Assess and monitor skin integrity  - Assess and monitor nutrition and hydration status  - Monitor labs   - Assess for incontinence   - Turn and reposition patient  - Assist with mobility/ambulation  - Relieve pressure over bony prominences  - Avoid friction and shearing  - Provide appropriate hygiene as needed including keeping skin clean and dry  - Evaluate need for skin moisturizer/barrier cream  - Collaborate with interdisciplinary team   - Patient/family teaching  - Consider wound care consult   Outcome: Progressing

## 2020-09-03 NOTE — ASSESSMENT & PLAN NOTE
A POA, underweight adult as evidence by BMI of 19 35 with history cerebral palsy, dysphagia, spastic quadriplegia, noted to have gained weight since last admission  Patient requires continuation of pureed diet with pudding thick liquids, full assistance for meals and monitoring of p o  Intake    · 07/26/2020 weight 89 lb  · 01/21/2020 weight 107 lb    Plan:  · Continue modified dysphagia diet, pureed thick, pudding thick  · Consult nutrition/appreciate recommendations Dose Administered (Numbers Only): 90 Dose Administered (Numbers Only - Mg, G, Mcg, Units, Cc): 90

## 2020-09-03 NOTE — ASSESSMENT & PLAN NOTE
POA, patient is on daily Macrodantin as patient has a history of colonizer with Klebsiella, urine micro and urinalysis show UTI    · CT:  Thickened bladder wall likely due to acute cystitis  · History of UTI, Enterobacter    This morning, patient's urine culture returned with greater than 100,000 Klebsiella varicola   Plan:  · Continue IV ceftriaxone 1 g Q 12  · Hold home Macrodantin

## 2020-09-03 NOTE — ASSESSMENT & PLAN NOTE
POA, patient has history of iron deficiency anemia and is on home ferrous sulfate 325 mg b i d   · Hg = 11 1    This morning, patient has macrocytic anemia and hemoglobin is 8 6 from 9 5 change in hemoglobin likely due to hemodilution     · Folate level and B12 level elevated  Plan:  · Continue iron supplementation  · Follow up folate and B12

## 2020-09-03 NOTE — CASE MANAGEMENT
LOS 2  Pt is currently not a bundle  Pt is a 30 day readmission  Pt was recently admitted last month due to UTI  Pt has a hx of recurrent UTI's  Pt readmitted due to Sepsis likely secondary to UTI  Spoke with charge RN Laura Shook at the group home where pt resides at  Pt has a hx of Cerebral palsy and Bipolar and resides at [facility]  Prior to readmission, Laura Shook stated pt has been using the wheelchair primarily since she has been home from the hospital and needs assistance with all ADLs which staff help her with  Pt is currently open with [facility] for ST and PT  Discussed [facility] with [facility]  Choice is to return/continue services  Referral placed  PCP- Nki Natarajan stated they would not be able to accept the pt back to the facility until Tuesday Friday they are short staffed and Laura Salgador is off for the day and unable to admit the pt tomorrow  They do not do weekend admissions and Monday is a holiday  Informed Laura Shook pt will be ready for discharge by tomorrow  She stated she is not able to accept even if we prep the discharge today for tomorrow due to staffing at the group [place] and Laura Salgador being off tomorrow  SLIM made aware of facility not being to accept pt back til Tuesday  CM reviewed discharge planning process including the following: identifying caregivers at home, preference for d/c planning needs, availability of [facility] program, availability of treatment team to discuss questions or concerns patient and/or family may have regarding diagnosis, plan of care, old or new medications and discharge planning   CM will continue to follow for care coordination and update assessment as appropriate

## 2020-09-03 NOTE — ASSESSMENT & PLAN NOTE
POA, the Video Barium Study performed on 08/04/2020 showed "significant oropharyngeal dysphagia characterized primarily by slow, weak tongue "pump" transfer, delayed swallowing initiation, incomplete airway protection and WITH NO RESPONSE TO PENETRATION OR TO PASSIVE ASPIRATION OVER TIME TODAY"    Plan:  · Continue modified dysphagia diet, pureed, pudding thick  · Nutrition consult/appreciate recommendations

## 2020-09-04 LAB
ANION GAP SERPL CALCULATED.3IONS-SCNC: 6 MMOL/L (ref 4–13)
BASOPHILS # BLD MANUAL: 0 THOUSAND/UL (ref 0–0.1)
BASOPHILS NFR MAR MANUAL: 0 % (ref 0–1)
BUN SERPL-MCNC: 15 MG/DL (ref 5–25)
CALCIUM SERPL-MCNC: 8.1 MG/DL (ref 8.3–10.1)
CHLORIDE SERPL-SCNC: 113 MMOL/L (ref 100–108)
CO2 SERPL-SCNC: 30 MMOL/L (ref 21–32)
CREAT SERPL-MCNC: 0.94 MG/DL (ref 0.6–1.3)
EOSINOPHIL # BLD MANUAL: 1.16 THOUSAND/UL (ref 0–0.4)
EOSINOPHIL NFR BLD MANUAL: 13 % (ref 0–6)
ERYTHROCYTE [DISTWIDTH] IN BLOOD BY AUTOMATED COUNT: 13.2 % (ref 11.6–15.1)
GFR SERPL CREATININE-BSD FRML MDRD: 67 ML/MIN/1.73SQ M
GLUCOSE SERPL-MCNC: 109 MG/DL (ref 65–140)
GLUCOSE SERPL-MCNC: 113 MG/DL (ref 65–140)
GLUCOSE SERPL-MCNC: 169 MG/DL (ref 65–140)
GLUCOSE SERPL-MCNC: 208 MG/DL (ref 65–140)
GLUCOSE SERPL-MCNC: 292 MG/DL (ref 65–140)
HCT VFR BLD AUTO: 28.1 % (ref 34.8–46.1)
HGB BLD-MCNC: 9.1 G/DL (ref 11.5–15.4)
LYMPHOCYTES # BLD AUTO: 2.76 THOUSAND/UL (ref 0.6–4.47)
LYMPHOCYTES # BLD AUTO: 31 % (ref 14–44)
MCH RBC QN AUTO: 35.5 PG (ref 26.8–34.3)
MCHC RBC AUTO-ENTMCNC: 32.4 G/DL (ref 31.4–37.4)
MCV RBC AUTO: 110 FL (ref 82–98)
MONOCYTES # BLD AUTO: 0.36 THOUSAND/UL (ref 0–1.22)
MONOCYTES NFR BLD: 4 % (ref 4–12)
MYELOCYTES NFR BLD MANUAL: 3 % (ref 0–1)
NEUTROPHILS # BLD MANUAL: 4.18 THOUSAND/UL (ref 1.85–7.62)
NEUTS BAND NFR BLD MANUAL: 1 % (ref 0–8)
NEUTS SEG NFR BLD AUTO: 46 % (ref 43–75)
NRBC BLD AUTO-RTO: 0 /100 WBCS
NRBC BLD AUTO-RTO: 1 /100 WBC (ref 0–2)
PLATELET # BLD AUTO: 123 THOUSANDS/UL (ref 149–390)
PLATELET BLD QL SMEAR: ABNORMAL
PMV BLD AUTO: 11.2 FL (ref 8.9–12.7)
POTASSIUM SERPL-SCNC: 3.9 MMOL/L (ref 3.5–5.3)
RBC # BLD AUTO: 2.56 MILLION/UL (ref 3.81–5.12)
SODIUM SERPL-SCNC: 149 MMOL/L (ref 136–145)
TOTAL CELLS COUNTED SPEC: 100
VARIANT LYMPHS # BLD AUTO: 2 %
WBC # BLD AUTO: 8.9 THOUSAND/UL (ref 4.31–10.16)

## 2020-09-04 PROCEDURE — 80048 BASIC METABOLIC PNL TOTAL CA: CPT | Performed by: PSYCHIATRY & NEUROLOGY

## 2020-09-04 PROCEDURE — 85007 BL SMEAR W/DIFF WBC COUNT: CPT | Performed by: PSYCHIATRY & NEUROLOGY

## 2020-09-04 PROCEDURE — 85027 COMPLETE CBC AUTOMATED: CPT | Performed by: PSYCHIATRY & NEUROLOGY

## 2020-09-04 PROCEDURE — 99232 SBSQ HOSP IP/OBS MODERATE 35: CPT | Performed by: INTERNAL MEDICINE

## 2020-09-04 PROCEDURE — 82948 REAGENT STRIP/BLOOD GLUCOSE: CPT

## 2020-09-04 PROCEDURE — 97163 PT EVAL HIGH COMPLEX 45 MIN: CPT

## 2020-09-04 RX ADMIN — INSULIN LISPRO 1 UNITS: 100 INJECTION, SOLUTION INTRAVENOUS; SUBCUTANEOUS at 11:44

## 2020-09-04 RX ADMIN — INSULIN LISPRO 1 UNITS: 100 INJECTION, SOLUTION INTRAVENOUS; SUBCUTANEOUS at 17:49

## 2020-09-04 RX ADMIN — ARIPIPRAZOLE 30 MG: 10 TABLET ORAL at 21:58

## 2020-09-04 RX ADMIN — VALPROIC ACID 500 MG: 250 SOLUTION ORAL at 17:48

## 2020-09-04 RX ADMIN — PANTOPRAZOLE SODIUM 20 MG: 20 TABLET, DELAYED RELEASE ORAL at 05:29

## 2020-09-04 RX ADMIN — OXYBUTYNIN CHLORIDE 5 MG: 5 TABLET, EXTENDED RELEASE ORAL at 09:20

## 2020-09-04 RX ADMIN — LEVOTHYROXINE SODIUM 25 MCG: 25 TABLET ORAL at 05:29

## 2020-09-04 RX ADMIN — ENOXAPARIN SODIUM 40 MG: 100 INJECTION SUBCUTANEOUS at 09:21

## 2020-09-04 RX ADMIN — CITALOPRAM HYDROBROMIDE 40 MG: 20 TABLET ORAL at 09:17

## 2020-09-04 RX ADMIN — PRAVASTATIN SODIUM 40 MG: 40 TABLET ORAL at 17:49

## 2020-09-04 RX ADMIN — BACLOFEN 10 MG: 10 TABLET ORAL at 17:49

## 2020-09-04 RX ADMIN — FERROUS SULFATE TAB 325 MG (65 MG ELEMENTAL FE) 325 MG: 325 (65 FE) TAB at 09:20

## 2020-09-04 RX ADMIN — LORAZEPAM 0.5 MG: 0.5 TABLET ORAL at 21:57

## 2020-09-04 RX ADMIN — FERROUS SULFATE TAB 325 MG (65 MG ELEMENTAL FE) 325 MG: 325 (65 FE) TAB at 17:48

## 2020-09-04 RX ADMIN — OYSTER SHELL CALCIUM WITH VITAMIN D 1 TABLET: 500; 200 TABLET, FILM COATED ORAL at 09:17

## 2020-09-04 RX ADMIN — VITAMIN D, TAB 1000IU (100/BT) 2000 UNITS: 25 TAB at 09:20

## 2020-09-04 RX ADMIN — VALPROIC ACID 500 MG: 250 SOLUTION ORAL at 09:20

## 2020-09-04 RX ADMIN — OYSTER SHELL CALCIUM WITH VITAMIN D 1 TABLET: 500; 200 TABLET, FILM COATED ORAL at 17:49

## 2020-09-04 RX ADMIN — CEFTRIAXONE SODIUM 1000 MG: 10 INJECTION, POWDER, FOR SOLUTION INTRAVENOUS at 09:37

## 2020-09-04 RX ADMIN — INSULIN LISPRO 2 UNITS: 100 INJECTION, SOLUTION INTRAVENOUS; SUBCUTANEOUS at 22:03

## 2020-09-04 RX ADMIN — BACLOFEN 10 MG: 10 TABLET ORAL at 21:56

## 2020-09-04 RX ADMIN — OXYBUTYNIN CHLORIDE 5 MG: 5 TABLET, EXTENDED RELEASE ORAL at 17:48

## 2020-09-04 RX ADMIN — BACLOFEN 10 MG: 10 TABLET ORAL at 09:20

## 2020-09-04 RX ADMIN — TRAZODONE HYDROCHLORIDE 100 MG: 100 TABLET ORAL at 21:56

## 2020-09-04 NOTE — PLAN OF CARE
Problem: PHYSICAL THERAPY ADULT  Goal: Performs mobility at highest level of function for planned discharge setting  See evaluation for individualized goals  Description: Treatment/Interventions: Functional transfer training, LE strengthening/ROM, Therapeutic exercise, Endurance training, Cognitive reorientation, Patient/family training, Bed mobility  Equipment Recommended: Wheelchair       See flowsheet documentation for full assessment, interventions and recommendations  Outcome: Progressing  Note: Prognosis: Guarded  Problem List: Decreased strength, Decreased range of motion, Decreased endurance, Impaired balance, Decreased mobility, Decreased safety awareness, Decreased cognition, Decreased coordination, Impaired tone  Assessment: Pt presents with 3 days of intermittent fevers and increased loose to watery bowel movements and decreased SpO2 around low 90s  Dx: severe sepsis, acute cystitis, underweight, dysphagia, cerebral palsy, and iron deficiency anemia  order placed for PT eval and tx  pt presents w/ comorbidities of CP, impulse control disorder, UTI, anemia, left hip fx, DM, hyperlipidemia, incontinence, and osteoporosis and personal factors of mobilizing w/ assistive device, decreased cognition, inability to perform IADLs and inability to perform ADLs  pt presents w/ weakness, decreased ROM, decreased endurance, impaired cognition, impaired balance, impaired coordination, impaired tone, decreased safety awareness and fall risk  these impairments are evident in findings from physical examination (weakness, decreased ROM, impaired coordination and impaired tone), mobility assessment (need for mod assist w/ transfers, necessity for input for mobility technique, limited ability to follow verbal instructions), and Barthel Index: 15/100  pt needed input for task focus and mobility technique/safety  pt is at risk for falls due to physical and safety awareness deficits   pt's clinical presentation is unstable/unpredictable (evident in poor blood sugar control, need for assist w/ all phases of mobility, and need for input for task focus and mobility technique/safety)  pt needs inpatient PT tx to improve mobility deficits  discharge recommendation is for return to group home upon discharge from hospital         PT Discharge Recommendation: Other (Comment)(return to group home)          See flowsheet documentation for full assessment

## 2020-09-04 NOTE — ASSESSMENT & PLAN NOTE
A POA, underweight adult as evidence by BMI of 19 35 with history cerebral palsy, dysphagia, spastic quadriplegia, noted to have gained weight since last admission  Patient requires continuation of pureed diet with pudding thick liquids, full assistance for meals and monitoring of p o  Intake    · 07/26/2020 weight 89 lb  · 01/21/2020 weight 107 lb    Plan:  · Continue modified dysphagia diet, pureed thick, pudding thick  · Continue nutritional supplement

## 2020-09-04 NOTE — ASSESSMENT & PLAN NOTE
Lab Results   Component Value Date    HGBA1C 5 8 (H) 08/01/2020       Recent Labs     09/03/20  1615 09/03/20 2056 09/04/20  0804 09/04/20  1129   POCGLU 171* 299* 109 208*       Blood Sugar Average: Last 72 hrs:    Plan:  · Hold home metformin  · Insulin sliding scale and Accu-Chek  · Diabetic modified dysphagia diet  · Avoid hypoglycemia

## 2020-09-04 NOTE — ASSESSMENT & PLAN NOTE
POA, patient is on daily Macrodantin as patient has a history of colonizer with Klebsiella, urine micro and urinalysis show UTI  · CT:  Thickened bladder wall likely due to acute cystitis  · History of UTI, Enterobacter    This morning, patient's urine culture returned with greater than 100,000 Klebsiella varicola and in on appropriate antibiotics    Plan:  · Continue IV ceftriaxone 1 g Q 12  · Hold home Macrodantin

## 2020-09-04 NOTE — ASSESSMENT & PLAN NOTE
POA, patient has history of iron deficiency anemia and is on home ferrous sulfate 325 mg b i d   · Hg = 11 1    This morning, patient continues to have macrocytic anemia and hemoglobin is stable      · Folate level and B12 level elevated  Plan:  · Continue iron supplementation  · Follow up folate and B12

## 2020-09-04 NOTE — PHYSICAL THERAPY NOTE
PHYSICAL THERAPY EVALUATION NOTE    Patient Name: Jose C Victor  FTMKR'Y Date: 9/4/2020  AGE:   64 y o  Mrn:   [id number]  ADMIT DX:  UTI (urinary tract infection) [N39 0]  Fever [R50 9]  History of cerebral palsy [Z86 69]  Severe sepsis (HCC) [A41 9, R65 20]  [facility] ) [J18 1]    Past Medical History:   Diagnosis Date    Adjustment disorder     Altered mental status 12/11/2015    Anemia     Bipolar 1 disorder ([place] [address] )     Cerebral palsy ([facility] [place] [facility] )     Chronic hypernatremia 2/6/2016    Closed fracture of left hip ([place] [address] ) 1/19/2016    Closed left hip fracture ([facility])     no surgery    Constipation     Dehydration 2/20/2016    Diabetes mellitus ([place] [address] )     Disease of thyroid gland     Diverticulosis     Fracture of multiple ribs of right side     Hip fracture ([facility] [place] [facility] ) 07/26/2015    left    Hyperlipidemia     Hypernatremia 12/28/2018    Hypotension     Impulse control disorder     Incontinence     Intellectual disability due to developmental disorder, unspecified     Microalbuminuria     Osteopathia     Osteoporosis     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HCC)     Thrombocytopenia ([facility] [place] [facility] ) 7/31/2015     Length Of Stay: 3  PHYSICAL THERAPY EVALUATION :    09/04/20 1516   Pain Assessment   Pain Assessment Tool FLACC   Pain Rating: FLACC (Rest) - Face 0   Pain Rating: FLACC (Rest) - Legs 0   Pain Rating: FLACC (Rest) - Activity 0   Pain Rating: FLACC (Rest) - Cry 0   Pain Rating: FLACC (Rest) - Consolability 0   Score: FLACC (Rest) 0   Pain Rating: FLACC (Activity) - Face 0   Pain Rating: FLACC (Activity) - Legs 0   Pain Rating: FLACC (Activity) - Activity 0   Pain Rating: FLACC (Activity) - Cry 0   Pain Rating: FLACC (Activity) - Consolability 0   Score: FLACC (Activity) 0   Home Living   Type of Home Group Home   Additional Comments pt mobilizes in wheelchair  needs assist w/ ADLs   completes stand pivot transfer w/ hand hold assist  pt was unable to provide social history  history was obtained from documentation and Chance Amor  Prior Function   Comments pt seen sitting in chair  pt was mostly nonverbal  provided occasional response of "no" to questions  pt participated better w/ use of demonstration and gesturing  Restrictions/Precautions   Other Precautions Impulsive;Cognitive; Chair Alarm; Bed Alarm;Multiple lines; Fall Risk   General   Additional Pertinent History 9/3/20 at 20:56, glucose was 299   Family/Caregiver Present No   Cognition   Overall Cognitive Status Impaired   Arousal/Participation Lethargic   Orientation Level Unable to assess  (due to mostly nonverbal status)   Following Commands Follows one step commands inconsistently   Comments pt was identified in the computer and using ID bracelet   RUE Assessment   RUE Assessment X  (limited active ROM, uncertain if maximal effort provided)   LUE Assessment   LUE Assessment X  (limited active ROM, uncertain if maximal effort provided)   RLE Assessment   RLE Assessment X  (limited active ROM, uncertain if maximal effort provided)   LLE Assessment   LLE Assessment   (limited active ROM, uncertain if maximal effort provided)   Coordination   Movements are Fluid and Coordinated 0   Coordination and Movement Description hypertonia B LEs   Transfers   Sit to Stand 3  Moderate assistance   Additional items Assist x 1; Increased time required;Verbal cues  (for LE positioning)   Stand to Sit 3  Moderate assistance   Additional items Assist x 1; Increased time required;Verbal cues  (for body positioning, controlled descent)   Stand pivot 3  Moderate assistance  (chair to edge of bed, edge of bed to chair)   Additional items Assist x 1; Increased time required;Verbal cues  (for LE positioning, posture)   Ambulation/Elevation   Gait pattern Not appropriate  (pt is nonambulatory at baseline)   Balance   Static Sitting Fair   Static Standing Poor   Ambulatory Zero Activity Tolerance   Activity Tolerance Patient limited by fatigue   Nurse Made Aware spoke to [facility]   Assessment   Prognosis Guarded   Problem List Decreased strength;Decreased range of motion;Decreased endurance; Impaired balance;Decreased mobility; Decreased safety awareness;Decreased cognition;Decreased coordination; Impaired tone   Assessment Pt presents with 3 days of intermittent fevers and increased loose to watery bowel movements and decreased SpO2 around low 90s  Dx: severe sepsis, acute cystitis, underweight, dysphagia, cerebral palsy, and iron deficiency anemia  order placed for PT eval and tx  pt presents w/ comorbidities of CP, impulse control disorder, UTI, anemia, left hip fx, DM, hyperlipidemia, incontinence, and osteoporosis and personal factors of mobilizing w/ assistive device, decreased cognition, inability to perform IADLs and inability to perform ADLs  pt presents w/ weakness, decreased ROM, decreased endurance, impaired cognition, impaired balance, impaired coordination, impaired tone, decreased safety awareness and fall risk  these impairments are evident in findings from physical examination (weakness, decreased ROM, impaired coordination and impaired tone), mobility assessment (need for mod assist w/ transfers, necessity for input for mobility technique, limited ability to follow verbal instructions), and Barthel Index: 15/100  pt needed input for task focus and mobility technique/safety  pt is at risk for falls due to physical and safety awareness deficits  pt's clinical presentation is unstable/unpredictable (evident in poor blood sugar control, need for assist w/ all phases of mobility, and need for input for task focus and mobility technique/safety)  pt needs inpatient PT tx to improve mobility deficits   discharge recommendation is for return to group home upon discharge from hospital    Goals   Patient Goals pt was unable to state goals   STG Expiration Date 09/14/20 Short Term Goal #1 pt will: Increase bilateral LE strength 3/5 or greater grade to facilitate independent mobility, Perform all bed mobility tasks w/ minx1 to decrease caregiver burden, Perform sit <---> stand transfers w/ minx1 to improve independence, Complete stand pivot transfers w/ hand hold assist w/ minx1 w/o LOB to facilitate safe return to group home, Increase static standing balance 1 grade to decrease risk for falls, Tolerate 3 hr OOB to faciliate upright tolerance and Improve Barthel Index score to 40 or greater to facilitate independence   PT Treatment Day 0   Plan   Treatment/Interventions Functional transfer training;LE strengthening/ROM; Therapeutic exercise; Endurance training;Cognitive reorientation;Patient/family training;Bed mobility   PT Frequency 2-3x/wk   Recommendation   PT Discharge Recommendation Other (Comment)  (return to group home)   Equipment Recommended Wheelchair   Barthel Index   Feeding 0   Bathing 0   Grooming Score 0   Dressing Score 0   Bladder Score 0   Bowels Score 0   Toilet Use Score 5   Transfers (Bed/Chair) Score 10   Mobility (Level Surface) Score 0   Stairs Score 0   Barthel Index Score 15     Skilled PT recommended while in hospital and upon DC to progress pt toward treatment goals       Rossana Martinez, PT

## 2020-09-04 NOTE — DISCHARGE SUMMARY
Discharge- Michelle Lott 1960, 64 y o  female MRN: [id number]    Unit/Bed#: S -01 Encounter: [id number]    Primary Care Provider: Jami Jorge DO   Date and time admitted to hospital: 9/1/2020 10:10 AM        * Severe sepsis [facility])  Assessment & Plan  57-year-old female with history of recurrent UTIs on daily nitrofurantoin, cerebral palsy, bipolar, GERD, hemicolectomy for volvulus, presents from group home with caregiver with 3 days of intermittent fevers, T-max 101 2°, increased loose to watery, nonbloody bowel movements  Patient meets severe sepsis criteria likely secondary to UTI  C diff toxin negative  Cannot rule out pneumonia at this point  · Lactic acid: 4 9 on admission; improved to 1 0 on repeat  · Tachycardia greater than 90 on admission  · BP 89/57, however per caregiver, this is close to baseline  · UA/Urine Micro = bacteria innumerable, nitrite+ leuk+  · Leukocytosis, WBC = 11 6  · CXR:  Unremarkable  · CT:   · Groundglass density left base with the areas of basilar consolidation at the right lung base may be acute or chronic  May represent acute infiltrate or nonresolved previously noted infiltrate    · Thickened urinary bladder wall     Today, patient is clinically improved medically stable for discharge  · Urine culture: >100,000 Klebsiella varicolla   · COVID negative, Cdiff negative  · Blood culture:  No growth at 72 hours  · Ova/ parasite:  Negative    Plan:  · Discharge today to group Home, patient completed 8 days of IV antibiotics  · Continue to hold IV fluid maintenance as patient is hypernatremic at 147 on 09/07/2020  · Patient has history of hypernatremia on IVF  · Outpatient repeat CBC with differential in 1 week, patient follow up with PCP      Acute cystitis without hematuria  Assessment & Plan  POA, patient is on daily Macrodantin as patient has a history of colonizer with Klebsiella, urine micro and urinalysis show UTI    · CT:  Thickened bladder wall likely due to acute cystitis  · History of UTI, Enterobacter    Patient is clinically improved and completed 8 days of IV antibiotics  Plan:  · Restart Macrodantin on discharge    Underweight  Assessment & Plan  A POA, underweight adult as evidence by BMI of 19 35 with history cerebral palsy, dysphagia, spastic quadriplegia, noted to have gained weight since last admission  Patient requires continuation of pureed diet with pudding thick liquids, full assistance for meals and monitoring of p o  Intake  · 07/26/2020 weight 89 lb  · 01/21/2020 weight 107 lb    Plan:  · Continue modified dysphagia diet, pureed thick, pudding thick  · Continue nutritional supplementation    Dysphagia  Assessment & Plan  POA, the Video Barium Study performed on 08/04/2020 showed "significant oropharyngeal dysphagia characterized primarily by slow, weak tongue "pump" transfer, delayed swallowing initiation, incomplete airway protection and WITH NO RESPONSE TO PENETRATION OR TO PASSIVE ASPIRATION OVER TIME TODAY"    Plan:   · Continue modified dysphagia diet, pureed, pudding thick    [facility]  Assessment & Plan  POA, patient mentates at baseline, has muscular rigidity secondary to cerebral palsy  · Baseline spastic quadriparesis and intellectual disability    Plan:  · Continue baclofen 10 mg t i d  For spasticity    Bipolar disorder ([facility] )  Assessment & Plan  POA, bipolar disorder controlled with Abilify 30 mg q h s  and Depakote 250 mg/5ml 10 mL p o  B i d  Plan:  Continue home medications    Gastroesophageal reflux disease without esophagitis  Assessment & Plan  POA, GERD controlled on PPI  Plan:  · Continue Protonix 20 mg q d  Hypothyroidism  Assessment & Plan  POA, controlled, Synthroid 25 mcg q a m        Plan:  · Continue home Synthroid       Iron deficiency anemia  Assessment & Plan  POA, patient has history of iron deficiency anemia and is on home ferrous sulfate 325 mg b i d   · Hg = 11 1    During this admission, patient's hemoglobin has ranged from 8 6-9 5  This morning, patient's hemoglobin is stable  Plan:  · Continue iron supplementation  · Patient to follow up with PCP  · Repeat CBC outpatient in 1 week    Type 2 diabetes mellitus, without long-term current use of insulin [facility])  Assessment & Plan  Lab Results   Component Value Date    HGBA1C 5 8 (H) 08/01/2020       Recent Labs     09/07/20  1545 09/07/20  2106 09/08/20  0728 09/08/20  1109   POCGLU 227* 113 196* 217*       Blood Sugar Average: Last 72 hrs:    Plan:  · Discharge today to group Home  · Restart patient's metformin prior to discharge          Discharging Resident Physician: Morgan Turner MD  Attending: Gertrudis Crowley MD  PCP: Lissette Fernandes DO  Admission Date: 9/1/2020  Discharge Date: 09/08/20    Disposition:     Group Home / [place] [facility]    Reason for Admission:  Severe sepsis secondary to UTI    Consultations During Hospital Stay:  Physical therapy    Procedures Performed:     · None    Significant Findings / Test Results:     · Urine culture >100,000 Klebsiella  · Hypernatremia up to 149  · Hemoglobin 8 6-9 5    Incidental Findings:   CT:Rounded 2 2 cm calcific density seen within the right side of the transverse colon may be due to enterolith     ·  Groundglass density left base with the areas of basilar consolidation at the right lung base may be acute or chronic   May represent acute infiltrate or nonresolved previously noted infiltrate   Follow-up chest CT suggested in 2 months    Test Results Pending at Discharge (will require follow up):    · HemeOccult stool     Outpatient Tests Requested:  · Repeat CBC with diff    Complications:  Hypernatremia with IV fluids at normal saline and half normal saline    Hospital Course:     Kaye Briceño is a 64 y o  female patient with history of cerebral palsy, bipolar, GERD and recurrent UTIs and daily nitrofurantoin, who originally presented to the hospital on 9/1/2020 due to increased loose watery stools, T-max 101 2°  Patient met severe sepsis criteria with lactate equals 4 9,  tachycardia greater than 90, leukocytosis of 11 6 likely secondary to UTI  In the ED, patient was started on IV cefepime which was continued  On admission, IV cefepime was transition to IV ceftriaxone and patient sodium levels increased up to 149 on normal saline and then half normal saline  Patient's IV fluids were held  Patient completed 8 days total of IV antibiotics (7 days of IV ceftriaxone)  Patient became constipated during hospital stay and was placed on home MiraLax  On day of discharge, soap side enema was attempted and patient had bowel movement  Patient be discharged discharged to group home with caregiver  No changes to medications  Pt to follow up Hemoccult stool test  Repeat CBC with diff in 1 week and follow-up with PCP after lab test       Condition at Discharge: fair     Discharge Day Visit / Exam:     Subjective:  Per nursing, patient refused medications this morning  This morning, sitting comfortably in bed  and stated that she was hungry  Vitals: Blood Pressure: (!) 82/52 (09/08/20 0700)  Pulse: (!) 51 (09/08/20 0700)  Temperature: (!) 97 4 °F (36 3 °C) (09/08/20 0700)  Temp Source: Axillary (09/08/20 0700)  Respirations: 16 (09/08/20 0700)  SpO2: 94 % (09/07/20 2215)  Exam:   Physical Exam  Constitutional:       General: She is not in acute distress  Appearance: She is ill-appearing (Chronically)  HENT:      Head: Normocephalic  Right Ear: External ear normal       Left Ear: External ear normal       Nose: Nose normal       Mouth/Throat:      Mouth: Mucous membranes are dry  Eyes:      Conjunctiva/sclera: Conjunctivae normal       Pupils: Pupils are equal, round, and reactive to light  Neck:      Musculoskeletal: Neck supple  Cardiovascular:      Rate and Rhythm: Regular rhythm  Bradycardia present     Pulmonary:      Effort: Pulmonary effort is normal  Breath sounds: Normal breath sounds  Comments: Decreased breath sounds bilaterally  Abdominal:      General: Abdomen is flat  Bowel sounds are normal  There is no distension  Palpations: Abdomen is soft  Tenderness: There is no abdominal tenderness  Musculoskeletal:      Right lower leg: No edema  Left lower leg: No edema  Skin:     General: Skin is warm and dry  Capillary Refill: Capillary refill takes less than 2 seconds  Neurological:      Mental Status: She is alert  Mental status is at baseline  Discussion with Family:  Spoke with patient's caregiver at bedside    Discharge instructions/Information to patient and family:   See after visit summary for information provided to patient and family  Provisions for Follow-Up Care:  See after visit summary for information related to follow-up care and any pertinent home health orders  Planned Readmission:  None     Discharge Medications:  See after visit summary for reconciled discharge medications provided to patient and family        ** Please Note: This note has been constructed using a voice recognition system **

## 2020-09-04 NOTE — ASSESSMENT & PLAN NOTE
53-year-old female with history of recurrent UTIs on daily nitrofurantoin, cerebral palsy, bipolar, GERD, hemicolectomy for volvulus, presents from group home with caregiver with 3 days of intermittent fevers, T-max 101 2°, increased loose to watery, nonbloody bowel movements  Patient meets severe sepsis criteria likely secondary to UTI  Cannot rule out pneumonia or C diff at this point  · Lactic acid: 4 9  · Tachycardia greater than 90  · BP 89/57, however per caregiver, this is close to baseline  · UA/Urine Micro = bacteria innumerable, nitrite+ leuk+  · Leukocytosis, WBC = 11 6  · CXR:  Unremarkable  · CT:   · Groundglass density left base with the areas of basilar consolidation at the right lung base may be acute or chronic  May represent acute infiltrate or nonresolved previously noted infiltrate    · Thickened urinary bladder wall     Today, Patient  is clinically improving, leukocytosis is trending down, and has remained afebrile the past 48 hours    WBC:  8 9 from 11 16, 13 6 and 11 6   · Urine culture: >100,000 Klebsiella varicolla   · COVID negative, Cdiff negative  · Blood culture:  No growth at 48 hours  · Eosinophilia:  1 16 from 1 86, 1 7 and 1 2  · No rash  · Rule out parasitic infection    Plan:  · Continue IV ceftriaxone 1 g q 12h   · Hold IV fluids as patient is hypernatremic at 149  · Follow-up ova parasites  · Follow-up blood cultures  · Respiratory protocol  · Repeat CBC with diff tomorrow

## 2020-09-04 NOTE — PROGRESS NOTES
Progress Note - Alpha Cons 1960, 64 y o  female MRN: [id number]    Unit/Bed#: S -01 Encounter: [id number]    Primary Care Provider: Marvin Haskins DO   Date and time admitted to hospital: 9/1/2020 10:10 AM        * Severe sepsis [facility])  Assessment & Plan  42-year-old female with history of recurrent UTIs on daily nitrofurantoin, cerebral palsy, bipolar, GERD, hemicolectomy for volvulus, presents from group home with caregiver with 3 days of intermittent fevers, T-max 101 2°, increased loose to watery, nonbloody bowel movements  Patient meets severe sepsis criteria likely secondary to UTI  Cannot rule out pneumonia or C diff at this point  · Lactic acid: 4 9  · Tachycardia greater than 90  · BP 89/57, however per caregiver, this is close to baseline  · UA/Urine Micro = bacteria innumerable, nitrite+ leuk+  · Leukocytosis, WBC = 11 6  · CXR:  Unremarkable  · CT:   · Groundglass density left base with the areas of basilar consolidation at the right lung base may be acute or chronic  May represent acute infiltrate or nonresolved previously noted infiltrate    · Thickened urinary bladder wall     Today, Patient  is clinically improving, leukocytosis is trending down, and has remained afebrile the past 48 hours  WBC:  8 9 from 11 16, 13 6 and 11 6   · Urine culture: >100,000 Klebsiella varicolla   · COVID negative, Cdiff negative  · Blood culture:  No growth at 48 hours  · Eosinophilia:  1 16 from 1 86, 1 7 and 1 2  · No rash  · Rule out parasitic infection    Plan:  · Continue IV ceftriaxone 1 g q 12h   · Hold IV fluids as patient is hypernatremic at 149  · Follow-up ova parasites  · Follow-up blood cultures  · Respiratory protocol  · Repeat CBC with diff tomorrow      Acute cystitis without hematuria  Assessment & Plan  POA, patient is on daily Macrodantin as patient has a history of colonizer with Klebsiella, urine micro and urinalysis show UTI    · CT:  Thickened bladder wall likely due to acute cystitis  · History of UTI, Enterobacter    This morning, patient's urine culture returned with greater than 100,000 Klebsiella varicola and in on appropriate antibiotics  Plan:  · Continue IV ceftriaxone 1 g Q 12  · Hold home Macrodantin    Underweight  Assessment & Plan  A POA, underweight adult as evidence by BMI of 19 35 with history cerebral palsy, dysphagia, spastic quadriplegia, noted to have gained weight since last admission  Patient requires continuation of pureed diet with pudding thick liquids, full assistance for meals and monitoring of p o  Intake  · 07/26/2020 weight 89 lb  · 01/21/2020 weight 107 lb    Plan:  · Continue modified dysphagia diet, pureed thick, pudding thick  · Continue nutritional supplement    Dysphagia  Assessment & Plan  POA, the Video Barium Study performed on 08/04/2020 showed "significant oropharyngeal dysphagia characterized primarily by slow, weak tongue "pump" transfer, delayed swallowing initiation, incomplete airway protection and WITH NO RESPONSE TO PENETRATION OR TO PASSIVE ASPIRATION OVER TIME TODAY"    Plan:  · Continue modified dysphagia diet, pureed, pudding thick  · Nutrition consult/appreciate recommendations    [facility] ([facility] )  Assessment & Plan  POA, patient mentates at baseline, has muscular rigidity secondary to cerebral palsy  · Baseline spastic quadriparesis and intellectual disability    Plan:  · Continue baclofen 10 mg t i d  For spasticity    Bipolar disorder ([facility] )  Assessment & Plan  POA, bipolar disorder controlled with Abilify 30 mg q h s  and Depakote 250 mg/5ml 10 mL p o  B i d  Plan:  Continue home medications    Gastroesophageal reflux disease without esophagitis  Assessment & Plan  POA, GERD controlled on PPI    Plan:  Protonix 20 mg q d  Hypothyroidism  Assessment & Plan  POA, controlled, Synthroid 25 mcg q a m  This morning, TSH WNL and controlled on home medications       Plan:  · Continue Synthroid       Iron deficiency anemia  Assessment & Plan  POA, patient has history of iron deficiency anemia and is on home ferrous sulfate 325 mg b i d   · Hg = 11 1    This morning, patient continues to have macrocytic anemia and hemoglobin is stable  · Folate level and B12 level elevated  Plan:  · Continue iron supplementation  · Follow up folate and B12    Type 2 diabetes mellitus, without long-term current use of insulin [facility])  Assessment & Plan  Lab Results   Component Value Date    HGBA1C 5 8 (H) 2020       Recent Labs     20  1615 20  2056 20  0804 20  1129   POCGLU 171* 299* 109 208*       Blood Sugar Average: Last 72 hrs:    Plan:  · Hold home metformin  · Insulin sliding scale and Accu-Chek  · Diabetic modified dysphagia diet  · Avoid hypoglycemia          VTE Pharmacologic Prophylaxis:   Pharmacologic: Enoxaparin (Lovenox)  Mechanical VTE Prophylaxis in Place: Yes    Discussions with Specialists or Other Care Team Provider:   Nursing  Case management will follow up    Education and Discussions with Family / Patient:  Called patient's caregiver, no answer will try again later    Current Length of Stay: 3 day(s)    Current Patient Status: Inpatient     Discharge Plan / Estimated Discharge Date:      Code Status: Level 1 - Full Code      Subjective:   Per nursing, patient had no acute events overnight however, patient continues to have liquid diarrhea  This morning, patient is sitting comfortably in bed  Patient appears brighter today  Patient is hungry and said eat      Objective:     Vitals:   Temp (24hrs), Av 2 °F (36 8 °C), Min:97 9 °F (36 6 °C), Max:98 6 °F (37 °C)    Temp:  [97 9 °F (36 6 °C)-98 6 °F (37 °C)] 98 2 °F (36 8 °C)  HR:  [54-65] 54  Resp:  [15-17] 15  BP: (83-89)/(47-59) 86/50  SpO2:  [95 %-97 %] 95 %  There is no height or weight on file to calculate BMI  Input and Output Summary (last 24 hours):        Intake/Output Summary (Last 24 hours) at 2020 1301  Last data filed at 9/4/2020 1148  Gross per 24 hour   Intake 170 ml   Output 1100 ml   Net -930 ml       Physical Exam:     Physical Exam  Vitals signs and nursing note reviewed  Constitutional:       General: She is not in acute distress  Appearance: She is not ill-appearing (Chronically)  Comments: Under weight   HENT:      Head: Normocephalic  Right Ear: External ear normal       Left Ear: External ear normal       Nose: Nose normal       Mouth/Throat:      Mouth: Mucous membranes are moist    Eyes:      Conjunctiva/sclera: Conjunctivae normal       Pupils: Pupils are equal, round, and reactive to light  Cardiovascular:      Rate and Rhythm: Regular rhythm  Bradycardia present  Heart sounds: Murmur (Diastolic murmur) present  Pulmonary:      Effort: Pulmonary effort is normal    Abdominal:      General: Abdomen is flat  Bowel sounds are normal       Palpations: Abdomen is soft  Musculoskeletal:         General: No tenderness  Right lower leg: No edema  Left lower leg: No edema  Skin:     General: Skin is warm and dry  Capillary Refill: Capillary refill takes less than 2 seconds  Neurological:      Mental Status: She is alert  Mental status is at baseline             Additional Data:     Labs:    Results from last 7 days   Lab Units 09/04/20  0548 09/03/20  0536   WBC Thousand/uL 8 90 11 16*   HEMOGLOBIN g/dL 9 1* 8 6*   HEMATOCRIT % 28 1* 26 2*   PLATELETS Thousands/uL 123* 118*   NEUTROS PCT %  --  53   LYMPHS PCT %  --  22   LYMPHO PCT % 31  --    MONOS PCT %  --  6   MONO PCT % 4  --    EOS PCT % 13* 17*     Results from last 7 days   Lab Units 09/04/20  0548  09/02/20  0535   POTASSIUM mmol/L 3 9   < > 3 8   CHLORIDE mmol/L 113*   < > 108   CO2 mmol/L 30   < > 28   BUN mg/dL 15   < > 16   CREATININE mg/dL 0 94   < > 1 05   CALCIUM mg/dL 8 1*   < > 8 0*   ALK PHOS U/L  --   --  56   ALT U/L  --   --  10*   AST U/L  --   --  10    < > = values in this interval not displayed  Results from last 7 days   Lab Units 09/01/20  1244   INR  0 98       * I Have Reviewed All Lab Data Listed Above  * Additional Pertinent Lab Tests Reviewed: All Labs Within Last 24 Hours Reviewed    Imaging:    Imaging Reports Reviewed Today Include:  None  Imaging Personally Reviewed by Myself Includes:  None    Recent Cultures (last 7 days):     Results from last 7 days   Lab Units 09/02/20  0004 09/01/20  1316 09/01/20  1244   BLOOD CULTURE   --   --  No Growth at 48 hrs     URINE CULTURE   --  >100,000 cfu/ml Klebsiella variicola*  --    C DIFF TOXIN B  Negative  --   --        Last 24 Hours Medication List:   Current Facility-Administered Medications   Medication Dose Route Frequency Provider Last Rate    acetaminophen  650 mg Oral Q6H PRN Bryan Price MD      ammonium lactate   Topical BID PRN Bryan Price MD      ARIPiprazole  30 mg Oral Q24H Bryan Price, MD      baclofen  10 mg Oral TID Bryan Price MD      calcium carbonate-vitamin D  1 tablet Oral BID With Meals Bryan Price MD      cefTRIAXone  1,000 mg Intravenous Q24H Fay Harmon MD 1,000 mg (09/04/20 0937)    cholecalciferol  2,000 Units Oral Daily Bryan Price MD      citalopram  40 mg Oral QAM Bryan Price MD      enoxaparin  40 mg Subcutaneous Daily Bryan Price MD      ferrous sulfate  325 mg Oral BID With Meals Bryan Price MD      insulin lispro  1-5 Units Subcutaneous 4 times day Bryan Price MD      levothyroxine  25 mcg Oral Early Morning Bryan Price MD      LORazepam  0 5 mg Oral HS Bryan Price MD      ondansetron  4 mg Intravenous Q6H PRN Bryan Price MD      oxybutynin  5 mg Oral TID Bryan Price MD      pantoprazole  20 mg Oral Early Morning Bryan Price MD      pravastatin  40 mg Oral Daily With Bin Sarah MD      sodium chloride (PF)  3 mL Intravenous Q1H PRN Bryan Price MD      traZODone  100 mg Oral HS Derek Ormond, MD      valproic acid  500 mg Oral BID Derek Ormond, MD          Today, Patient Was Seen By: Derek Ormond, MD    ** Please Note: This note has been constructed using a voice recognition system   **

## 2020-09-05 LAB
GLUCOSE SERPL-MCNC: 141 MG/DL (ref 65–140)
GLUCOSE SERPL-MCNC: 211 MG/DL (ref 65–140)
GLUCOSE SERPL-MCNC: 247 MG/DL (ref 65–140)
GLUCOSE SERPL-MCNC: 265 MG/DL (ref 65–140)

## 2020-09-05 PROCEDURE — 82948 REAGENT STRIP/BLOOD GLUCOSE: CPT

## 2020-09-05 PROCEDURE — 99232 SBSQ HOSP IP/OBS MODERATE 35: CPT | Performed by: INTERNAL MEDICINE

## 2020-09-05 RX ORDER — OXYBUTYNIN CHLORIDE 5 MG/1
5 TABLET ORAL 3 TIMES DAILY
Status: DISCONTINUED | OUTPATIENT
Start: 2020-09-05 | End: 2020-09-08 | Stop reason: HOSPADM

## 2020-09-05 RX ADMIN — OYSTER SHELL CALCIUM WITH VITAMIN D 1 TABLET: 500; 200 TABLET, FILM COATED ORAL at 10:00

## 2020-09-05 RX ADMIN — PANTOPRAZOLE SODIUM 20 MG: 20 TABLET, DELAYED RELEASE ORAL at 05:34

## 2020-09-05 RX ADMIN — FERROUS SULFATE TAB 325 MG (65 MG ELEMENTAL FE) 325 MG: 325 (65 FE) TAB at 17:30

## 2020-09-05 RX ADMIN — OYSTER SHELL CALCIUM WITH VITAMIN D 1 TABLET: 500; 200 TABLET, FILM COATED ORAL at 17:30

## 2020-09-05 RX ADMIN — BACLOFEN 10 MG: 10 TABLET ORAL at 17:30

## 2020-09-05 RX ADMIN — CEFTRIAXONE SODIUM 1000 MG: 10 INJECTION, POWDER, FOR SOLUTION INTRAVENOUS at 10:17

## 2020-09-05 RX ADMIN — FERROUS SULFATE TAB 325 MG (65 MG ELEMENTAL FE) 325 MG: 325 (65 FE) TAB at 10:00

## 2020-09-05 RX ADMIN — LEVOTHYROXINE SODIUM 25 MCG: 25 TABLET ORAL at 05:34

## 2020-09-05 RX ADMIN — OXYBUTYNIN CHLORIDE 5 MG: 5 TABLET, EXTENDED RELEASE ORAL at 17:31

## 2020-09-05 RX ADMIN — VALPROIC ACID 500 MG: 250 SOLUTION ORAL at 10:17

## 2020-09-05 RX ADMIN — OXYBUTYNIN CHLORIDE 5 MG: 5 TABLET, EXTENDED RELEASE ORAL at 10:00

## 2020-09-05 RX ADMIN — VITAMIN D, TAB 1000IU (100/BT) 2000 UNITS: 25 TAB at 10:00

## 2020-09-05 RX ADMIN — CITALOPRAM HYDROBROMIDE 40 MG: 20 TABLET ORAL at 10:00

## 2020-09-05 RX ADMIN — VALPROIC ACID 500 MG: 250 SOLUTION ORAL at 17:30

## 2020-09-05 RX ADMIN — PRAVASTATIN SODIUM 40 MG: 40 TABLET ORAL at 17:31

## 2020-09-05 RX ADMIN — ENOXAPARIN SODIUM 40 MG: 100 INJECTION SUBCUTANEOUS at 10:00

## 2020-09-05 RX ADMIN — BACLOFEN 10 MG: 10 TABLET ORAL at 10:00

## 2020-09-05 NOTE — ASSESSMENT & PLAN NOTE
POA, patient has history of iron deficiency anemia and is on home ferrous sulfate 325 mg b i d   · Hg = 11 1    This morning, patient continues to have macrocytic anemia and hemoglobin is stable      · Folate level and B12 levels appropriate  Plan:  · Continue iron supplementation

## 2020-09-05 NOTE — ASSESSMENT & PLAN NOTE
53-year-old female with history of recurrent UTIs on daily nitrofurantoin, cerebral palsy, bipolar, GERD, hemicolectomy for volvulus, presents from group home with caregiver with 3 days of intermittent fevers, T-max 101 2°, increased loose to watery, nonbloody bowel movements  Patient meets severe sepsis criteria likely secondary to UTI  C diff toxin negative  Cannot rule out pneumonia at this point  · Lactic acid: 4 9 on admission; improved to 1 0 on repeat  · Tachycardia greater than 90 on admission  · BP 89/57, however per caregiver, this is close to baseline  · UA/Urine Micro = bacteria innumerable, nitrite+ leuk+  · Leukocytosis, WBC = 11 6  · CXR:  Unremarkable  · CT:   · Groundglass density left base with the areas of basilar consolidation at the right lung base may be acute or chronic  May represent acute infiltrate or nonresolved previously noted infiltrate    · Thickened urinary bladder wall     Today, Patient  is clinically improving, leukocytosis is trending down, and has remained afebrile the past 48 hours    WBC:  8 9 from 11 16, 13 6 and 11 6   · Urine culture: >100,000 Klebsiella varicolla   · COVID negative, Cdiff negative  · Blood culture:  No growth at 72 hours  · Eosinophilia:  1 16 from 1 86, 1 7 and 1 2  · No rash  · Rule out parasitic infection    Plan:  · Continue IV ceftriaxone 1 g q 12h   · Hold IV fluids as patient is hypernatremic at 149 on 09/04/2020  · Follow-up ova parasites   · Continue to follow blood cultures  · Respiratory protocol  · Follow-up CBC with differential

## 2020-09-05 NOTE — PROGRESS NOTES
Progress Note - Juanjo Rushing 1960, 64 y o  female MRN: [id number]    Unit/Bed#: S -01 Encounter: [id number]    Primary Care Provider: Kervin Swanson,    Date and time admitted to hospital: 9/1/2020 10:10 AM        * Severe sepsis [facility])  Assessment & Plan  79-year-old female with history of recurrent UTIs on daily nitrofurantoin, cerebral palsy, bipolar, GERD, hemicolectomy for volvulus, presents from group home with caregiver with 3 days of intermittent fevers, T-max 101 2°, increased loose to watery, nonbloody bowel movements  Patient meets severe sepsis criteria likely secondary to UTI  C diff toxin negative  Cannot rule out pneumonia at this point  · Lactic acid: 4 9 on admission; improved to 1 0 on repeat  · Tachycardia greater than 90 on admission  · BP 89/57, however per caregiver, this is close to baseline  · UA/Urine Micro = bacteria innumerable, nitrite+ leuk+  · Leukocytosis, WBC = 11 6  · CXR:  Unremarkable  · CT:   · Groundglass density left base with the areas of basilar consolidation at the right lung base may be acute or chronic  May represent acute infiltrate or nonresolved previously noted infiltrate    · Thickened urinary bladder wall     Today, Patient  is clinically improving, leukocytosis is trending down, and has remained afebrile the past 48 hours    WBC:  8 9 from 11 16, 13 6 and 11 6   · Urine culture: >100,000 Klebsiella varicolla   · COVID negative, Cdiff negative  · Blood culture:  No growth at 72 hours  · Eosinophilia:  1 16 from 1 86, 1 7 and 1 2  · No rash  · Rule out parasitic infection    Plan:  · Continue IV ceftriaxone 1 g q 12h   · Hold IV fluids as patient is hypernatremic at 149 on 09/04/2020  · Follow-up ova parasites   · Continue to follow blood cultures  · Respiratory protocol  · Follow-up CBC with differential      Acute cystitis without hematuria  Assessment & Plan  POA, patient is on daily Macrodantin as patient has a history of colonizer with Klebsiella, urine micro and urinalysis show UTI  · CT:  Thickened bladder wall likely due to acute cystitis  · History of UTI, Enterobacter    Urine culture shows > 100,000 Klebsiella varicola and in on appropriate antibiotics  Plan:  · Continue IV ceftriaxone 1 g Q 12  · Hold home Macrodantin    Underweight  Assessment & Plan  A POA, underweight adult as evidence by BMI of 19 35 with history cerebral palsy, dysphagia, spastic quadriplegia, noted to have gained weight since last admission  Patient requires continuation of pureed diet with pudding thick liquids, full assistance for meals and monitoring of p o  Intake  · 07/26/2020 weight 89 lb  · 01/21/2020 weight 107 lb    Plan:  · Continue modified dysphagia diet, pureed thick, pudding thick  · Continue nutritional supplementation    Dysphagia  Assessment & Plan  POA, the Video Barium Study performed on 08/04/2020 showed "significant oropharyngeal dysphagia characterized primarily by slow, weak tongue "pump" transfer, delayed swallowing initiation, incomplete airway protection and WITH NO RESPONSE TO PENETRATION OR TO PASSIVE ASPIRATION OVER TIME TODAY"    Plan:   · Continue modified dysphagia diet, pureed, pudding thick  · Nutrition consult/appreciate recommendations    Hypothyroidism  Assessment & Plan  POA, controlled, Synthroid 25 mcg q a m  This morning, TSH WNL and controlled on home medications  Plan:  · Continue Synthroid       Iron deficiency anemia  Assessment & Plan  POA, patient has history of iron deficiency anemia and is on home ferrous sulfate 325 mg b i d   · Hg = 11 1    This morning, patient continues to have macrocytic anemia and hemoglobin is stable      · Folate level and B12 levels appropriate  Plan:  · Continue iron supplementation    Type 2 diabetes mellitus, without long-term current use of insulin [facility])  Assessment & Plan  Lab Results   Component Value Date    HGBA1C 5 8 (H) 08/01/2020       Recent Labs     09/04/20  1129 20  1551 20  0745   POCGLU 208* 169* 292* 141*       Blood Sugar Average: Last 72 hrs:    Plan:  · Hold home metformin  · Insulin sliding scale and Accu-Chek  · Diabetic modified dysphagia diet  · Avoid hypoglycemia      Bipolar disorder (HCC)  Assessment & Plan  POA, bipolar disorder controlled with Abilify 30 mg q h s  and Depakote 250 mg/5ml 10 mL p o  B i d  Plan:  Continue home medications    Cerebral palsy (HCC)  Assessment & Plan  POA, patient mentates at baseline, has muscular rigidity secondary to cerebral palsy  · Baseline spastic quadriparesis and intellectual disability    Plan:  · Continue baclofen 10 mg t i d  For spasticity    Gastroesophageal reflux disease without esophagitis  Assessment & Plan  POA, GERD controlled on PPI     Plan:  Protonix 20 mg q d  VTE Pharmacologic Prophylaxis:   Pharmacologic: Enoxaparin (Lovenox)  Mechanical VTE Prophylaxis in Place: Yes    Discussions with Specialists or Other Care Team Provider:  Discussed case with nursing team and read consult notes    Education and Discussions with Family / Patient: Will update Lisa later today    Current Length of Stay: 4 day(s)    Current Patient Status: Inpatient     Discharge Plan / Estimated Discharge Date:  2020, patient's group home cannot accept patient until Tuesday following holiday weekend    Code Status: Level 1 - Full Code      Subjective:   No acute overnight events  Patient is predominantly nonverbal and was nonresponsive to questions  She was seen and examined  Objective:     Vitals:   Temp (24hrs), Av 6 °F (36 4 °C), Min:97 4 °F (36 3 °C), Max:97 9 °F (36 6 °C)    Temp:  [97 4 °F (36 3 °C)-97 9 °F (36 6 °C)] 97 9 °F (36 6 °C)  HR:  [48-61] 48  Resp:  [14-16] 14  BP: (80-97)/(48-60) 95/55  SpO2:  [96 %-99 %] 99 %  There is no height or weight on file to calculate BMI  Input and Output Summary (last 24 hours):        Intake/Output Summary (Last 24 hours) at 9/5/2020 0832  Last data filed at 9/5/2020 6406  Gross per 24 hour   Intake 650 ml   Output 945 ml   Net -295 ml       Physical Exam:     Physical Exam  Vitals signs and nursing note reviewed  Constitutional:       General: She is not in acute distress  Appearance: She is not ill-appearing (Chronically)  Comments: Under weight   HENT:      Head: Normocephalic and atraumatic  Right Ear: External ear normal       Left Ear: External ear normal       Nose: Nose normal       Mouth/Throat:      Mouth: Mucous membranes are moist    Eyes:      General: No scleral icterus  Conjunctiva/sclera: Conjunctivae normal    Cardiovascular:      Rate and Rhythm: Regular rhythm  Bradycardia present  Pulses: Normal pulses  Heart sounds: No murmur  Pulmonary:      Effort: Pulmonary effort is normal       Breath sounds: Normal breath sounds  No wheezing  Abdominal:      General: Abdomen is flat  Bowel sounds are normal  There is no distension  Palpations: Abdomen is soft  Tenderness: There is no abdominal tenderness  Musculoskeletal:         General: No tenderness  Right lower leg: No edema  Left lower leg: No edema  Skin:     General: Skin is warm and dry  Capillary Refill: Capillary refill takes less than 2 seconds  Neurological:      Mental Status: She is alert  Mental status is at baseline     Psychiatric:      Comments: Patient is predominantly nonverbal secondary to cerebral palsy and was alert, but nonverbal during my examination         Additional Data:     Labs:    Results from last 7 days   Lab Units 09/04/20  0548 09/03/20  0536   WBC Thousand/uL 8 90 11 16*   HEMOGLOBIN g/dL 9 1* 8 6*   HEMATOCRIT % 28 1* 26 2*   PLATELETS Thousands/uL 123* 118*   NEUTROS PCT %  --  53   LYMPHS PCT %  --  22   LYMPHO PCT % 31  --    MONOS PCT %  --  6   MONO PCT % 4  --    EOS PCT % 13* 17*     Results from last 7 days   Lab Units 09/04/20  0548  09/02/20  0535   POTASSIUM mmol/L 3 9   < > 3 8   CHLORIDE mmol/L 113*   < > 108   CO2 mmol/L 30   < > 28   BUN mg/dL 15   < > 16   CREATININE mg/dL 0 94   < > 1 05   CALCIUM mg/dL 8 1*   < > 8 0*   ALK PHOS U/L  --   --  56   ALT U/L  --   --  10*   AST U/L  --   --  10    < > = values in this interval not displayed  Results from last 7 days   Lab Units 09/01/20  1244   INR  0 98       * I Have Reviewed All Lab Data Listed Above  * Additional Pertinent Lab Tests Reviewed: Kirsten 68 Admission Reviewed    Imaging:    Imaging Reports Reviewed Today Include:  None  Imaging Personally Reviewed by Myself Includes:  Chest x-ray, CT abdomen pelvis    Recent Cultures (last 7 days):     Results from last 7 days   Lab Units 09/02/20  0004 09/01/20  1316 09/01/20  1244   BLOOD CULTURE   --   --  No Growth at 72 hrs     URINE CULTURE   --  >100,000 cfu/ml Klebsiella variicola*  --    C DIFF TOXIN B  Negative  --   --        Last 24 Hours Medication List:   Current Facility-Administered Medications   Medication Dose Route Frequency Provider Last Rate    acetaminophen  650 mg Oral Q6H PRN Carmita Sanders MD      ammonium lactate   Topical BID PRN Carmita Sanders MD      ARIPiprazole  30 mg Oral Q24H Carmita Sanders MD      baclofen  10 mg Oral TID Carmita Sanders MD      calcium carbonate-vitamin D  1 tablet Oral BID With Meals Carmita Sanders MD      cefTRIAXone  1,000 mg Intravenous Q24H Candiss Lesches, MD 1,000 mg (09/04/20 0937)    cholecalciferol  2,000 Units Oral Daily Carmita Sanders MD      citalopram  40 mg Oral QAM Carmita Sanders MD      enoxaparin  40 mg Subcutaneous Daily Carmita Sanders MD      ferrous sulfate  325 mg Oral BID With Meals Carmita Sanders MD      insulin lispro  1-5 Units Subcutaneous TID With Meals Patricia Bustos MD      levothyroxine  25 mcg Oral Early Morning Carmita Sanders MD      LORazepam  0 5 mg Oral HS Carmita Sanders MD      ondansetron  4 mg Intravenous Q6H PRN Veronica Cleaning MD      oxybutynin  5 mg Oral TID Veronica Cleaning MD      pantoprazole  20 mg Oral Early Morning Veronica Cleaning MD      pravastatin  40 mg Oral Daily With Prakash Evans MD      sodium chloride (PF)  3 mL Intravenous Q1H PRN Veronica Cleaning MD      traZODone  100 mg Oral HS Veronica Cleaning MD      valproic acid  500 mg Oral BID Veronica Cleaning MD          Today, Patient Was Seen By: Lakesha Davidson MD    ** Please Note: This note has been constructed using a voice recognition system   **

## 2020-09-05 NOTE — ASSESSMENT & PLAN NOTE
A POA, underweight adult as evidence by BMI of 19 35 with history cerebral palsy, dysphagia, spastic quadriplegia, noted to have gained weight since last admission  Patient requires continuation of pureed diet with pudding thick liquids, full assistance for meals and monitoring of p o  Intake    · 07/26/2020 weight 89 lb  · 01/21/2020 weight 107 lb    Plan:  · Continue modified dysphagia diet, pureed thick, pudding thick  · Continue nutritional supplementation

## 2020-09-05 NOTE — ASSESSMENT & PLAN NOTE
POA, patient is on daily Macrodantin as patient has a history of colonizer with Klebsiella, urine micro and urinalysis show UTI  · CT:  Thickened bladder wall likely due to acute cystitis  · History of UTI, Enterobacter    Urine culture shows > 100,000 Klebsiella varicola and in on appropriate antibiotics    Plan:  · Continue IV ceftriaxone 1 g Q 12  · Hold home Macrodantin

## 2020-09-05 NOTE — ASSESSMENT & PLAN NOTE
Lab Results   Component Value Date    HGBA1C 5 8 (H) 08/01/2020       Recent Labs     09/04/20  1129 09/04/20  1551 09/04/20 2008 09/05/20  0745   POCGLU 208* 169* 292* 141*       Blood Sugar Average: Last 72 hrs:    Plan:  · Hold home metformin  · Insulin sliding scale and Accu-Chek  · Diabetic modified dysphagia diet  · Avoid hypoglycemia

## 2020-09-06 LAB
ANION GAP SERPL CALCULATED.3IONS-SCNC: 4 MMOL/L (ref 4–13)
BACTERIA BLD CULT: NORMAL
BUN SERPL-MCNC: 18 MG/DL (ref 5–25)
CALCIUM SERPL-MCNC: 9.1 MG/DL (ref 8.3–10.1)
CHLORIDE SERPL-SCNC: 107 MMOL/L (ref 100–108)
CO2 SERPL-SCNC: 36 MMOL/L (ref 21–32)
CREAT SERPL-MCNC: 0.99 MG/DL (ref 0.6–1.3)
ERYTHROCYTE [DISTWIDTH] IN BLOOD BY AUTOMATED COUNT: 12.7 % (ref 11.6–15.1)
FERRITIN SERPL-MCNC: 382 NG/ML (ref 8–388)
GFR SERPL CREATININE-BSD FRML MDRD: 63 ML/MIN/1.73SQ M
GLUCOSE SERPL-MCNC: 159 MG/DL (ref 65–140)
GLUCOSE SERPL-MCNC: 203 MG/DL (ref 65–140)
GLUCOSE SERPL-MCNC: 210 MG/DL (ref 65–140)
GLUCOSE SERPL-MCNC: 235 MG/DL (ref 65–140)
GLUCOSE SERPL-MCNC: 278 MG/DL (ref 65–140)
HCT VFR BLD AUTO: 28.2 % (ref 34.8–46.1)
HGB BLD-MCNC: 9 G/DL (ref 11.5–15.4)
IRON SATN MFR SERPL: 32 %
IRON SERPL-MCNC: 52 UG/DL (ref 50–170)
MCH RBC QN AUTO: 35.6 PG (ref 26.8–34.3)
MCHC RBC AUTO-ENTMCNC: 31.9 G/DL (ref 31.4–37.4)
MCV RBC AUTO: 112 FL (ref 82–98)
O+P STL CONC: NORMAL
PLATELET # BLD AUTO: 141 THOUSANDS/UL (ref 149–390)
PMV BLD AUTO: 11.1 FL (ref 8.9–12.7)
POTASSIUM SERPL-SCNC: 4.2 MMOL/L (ref 3.5–5.3)
RBC # BLD AUTO: 2.53 MILLION/UL (ref 3.81–5.12)
SODIUM SERPL-SCNC: 147 MMOL/L (ref 136–145)
TIBC SERPL-MCNC: 165 UG/DL (ref 250–450)
WBC # BLD AUTO: 7.34 THOUSAND/UL (ref 4.31–10.16)

## 2020-09-06 PROCEDURE — 80048 BASIC METABOLIC PNL TOTAL CA: CPT | Performed by: INTERNAL MEDICINE

## 2020-09-06 PROCEDURE — 82948 REAGENT STRIP/BLOOD GLUCOSE: CPT

## 2020-09-06 PROCEDURE — 83550 IRON BINDING TEST: CPT | Performed by: INTERNAL MEDICINE

## 2020-09-06 PROCEDURE — 82728 ASSAY OF FERRITIN: CPT | Performed by: INTERNAL MEDICINE

## 2020-09-06 PROCEDURE — 83540 ASSAY OF IRON: CPT | Performed by: INTERNAL MEDICINE

## 2020-09-06 PROCEDURE — 99232 SBSQ HOSP IP/OBS MODERATE 35: CPT | Performed by: INTERNAL MEDICINE

## 2020-09-06 PROCEDURE — 85027 COMPLETE CBC AUTOMATED: CPT | Performed by: INTERNAL MEDICINE

## 2020-09-06 RX ORDER — AMOXICILLIN 250 MG
1 CAPSULE ORAL
Status: DISCONTINUED | OUTPATIENT
Start: 2020-09-06 | End: 2020-09-08 | Stop reason: HOSPADM

## 2020-09-06 RX ORDER — POLYETHYLENE GLYCOL 3350 17 G/17G
17 POWDER, FOR SOLUTION ORAL DAILY PRN
Status: DISCONTINUED | OUTPATIENT
Start: 2020-09-06 | End: 2020-09-08 | Stop reason: HOSPADM

## 2020-09-06 RX ORDER — DOCUSATE SODIUM 100 MG/1
CAPSULE ORAL
COMMUNITY
Start: 2013-04-12 | End: 2020-09-18 | Stop reason: ALTCHOICE

## 2020-09-06 RX ADMIN — OXYBUTYNIN CHLORIDE 5 MG: 5 TABLET ORAL at 09:59

## 2020-09-06 RX ADMIN — FERROUS SULFATE TAB 325 MG (65 MG ELEMENTAL FE) 325 MG: 325 (65 FE) TAB at 09:58

## 2020-09-06 RX ADMIN — BACLOFEN 10 MG: 10 TABLET ORAL at 17:52

## 2020-09-06 RX ADMIN — BACLOFEN 10 MG: 10 TABLET ORAL at 09:58

## 2020-09-06 RX ADMIN — BACLOFEN 10 MG: 10 TABLET ORAL at 21:21

## 2020-09-06 RX ADMIN — VITAMIN D, TAB 1000IU (100/BT) 2000 UNITS: 25 TAB at 09:58

## 2020-09-06 RX ADMIN — FERROUS SULFATE TAB 325 MG (65 MG ELEMENTAL FE) 325 MG: 325 (65 FE) TAB at 17:51

## 2020-09-06 RX ADMIN — PRAVASTATIN SODIUM 40 MG: 40 TABLET ORAL at 17:52

## 2020-09-06 RX ADMIN — OYSTER SHELL CALCIUM WITH VITAMIN D 1 TABLET: 500; 200 TABLET, FILM COATED ORAL at 17:51

## 2020-09-06 RX ADMIN — VALPROIC ACID 500 MG: 250 SOLUTION ORAL at 17:51

## 2020-09-06 RX ADMIN — VALPROIC ACID 500 MG: 250 SOLUTION ORAL at 10:09

## 2020-09-06 RX ADMIN — LORAZEPAM 0.5 MG: 0.5 TABLET ORAL at 21:20

## 2020-09-06 RX ADMIN — CEFTRIAXONE SODIUM 1000 MG: 10 INJECTION, POWDER, FOR SOLUTION INTRAVENOUS at 09:57

## 2020-09-06 RX ADMIN — OXYBUTYNIN CHLORIDE 5 MG: 5 TABLET ORAL at 21:20

## 2020-09-06 RX ADMIN — CITALOPRAM HYDROBROMIDE 40 MG: 20 TABLET ORAL at 09:59

## 2020-09-06 RX ADMIN — OYSTER SHELL CALCIUM WITH VITAMIN D 1 TABLET: 500; 200 TABLET, FILM COATED ORAL at 09:58

## 2020-09-06 RX ADMIN — ARIPIPRAZOLE 30 MG: 10 TABLET ORAL at 21:20

## 2020-09-06 RX ADMIN — OXYBUTYNIN CHLORIDE 5 MG: 5 TABLET ORAL at 17:51

## 2020-09-06 RX ADMIN — TRAZODONE HYDROCHLORIDE 100 MG: 100 TABLET ORAL at 21:21

## 2020-09-06 RX ADMIN — ENOXAPARIN SODIUM 40 MG: 100 INJECTION SUBCUTANEOUS at 09:57

## 2020-09-06 RX ADMIN — DOCUSATE SODIUM 50 MG AND SENNOSIDES 8.6 MG 1 TABLET: 8.6; 5 TABLET, FILM COATED ORAL at 21:21

## 2020-09-06 NOTE — ASSESSMENT & PLAN NOTE
24-year-old female with history of recurrent UTIs on daily nitrofurantoin, cerebral palsy, bipolar, GERD, hemicolectomy for volvulus, presents from group home with caregiver with 3 days of intermittent fevers, T-max 101 2°, increased loose to watery, nonbloody bowel movements  Patient meets severe sepsis criteria likely secondary to UTI  C diff toxin negative  Cannot rule out pneumonia at this point  · Lactic acid: 4 9 on admission; improved to 1 0 on repeat  · Tachycardia greater than 90 on admission  · BP 89/57, however per caregiver, this is close to baseline  · UA/Urine Micro = bacteria innumerable, nitrite+ leuk+  · Leukocytosis, WBC = 11 6  · CXR:  Unremarkable  · CT:   · Groundglass density left base with the areas of basilar consolidation at the right lung base may be acute or chronic    May represent acute infiltrate or nonresolved previously noted infiltrate    · Thickened urinary bladder wall     Today, Patient  is clinically improving and has remained afebrile for 4 days  · Urine culture: >100,000 Klebsiella varicolla   · COVID negative, Cdiff negative  · Blood culture:  No growth at 72 hours  · Eosinophilia:  1 16 from 1 86, 1 7 and 1 2  · No rash  · Rule out parasitic infection    Plan:  · Continue IV ceftriaxone 1 g q 12h, day 5 of 7  · Hold IV fluids as patient is hypernatremic at 149 on 09/04/2020  · Follow-up ova parasites, occult   · Respiratory protocol  · Follow-up CBC with differential

## 2020-09-06 NOTE — ASSESSMENT & PLAN NOTE
Lab Results   Component Value Date    HGBA1C 5 8 (H) 08/01/2020       Recent Labs     09/05/20  1119 09/05/20  1622 09/05/20  2118 09/06/20  0734   POCGLU 265* 247* 211* 210*       Blood Sugar Average: Last 72 hrs:    Plan:  · Hold home metformin  · Insulin sliding scale and Accu-Chek  · Diabetic modified dysphagia diet  · Avoid hypoglycemia

## 2020-09-06 NOTE — PROGRESS NOTES
Progress Note - Madyson Velazco 1960, 64 y o  female MRN: [id number]    Unit/Bed#: S -01 Encounter: [id number]    Primary Care Provider: Jean Holliday DO   Date and time admitted to hospital: 9/1/2020 10:10 AM        * Severe sepsis [facility])  Assessment & Plan  53-year-old female with history of recurrent UTIs on daily nitrofurantoin, cerebral palsy, bipolar, GERD, hemicolectomy for volvulus, presents from group home with caregiver with 3 days of intermittent fevers, T-max 101 2°, increased loose to watery, nonbloody bowel movements  Patient meets severe sepsis criteria likely secondary to UTI  C diff toxin negative  Cannot rule out pneumonia at this point  · Lactic acid: 4 9 on admission; improved to 1 0 on repeat  · Tachycardia greater than 90 on admission  · BP 89/57, however per caregiver, this is close to baseline  · UA/Urine Micro = bacteria innumerable, nitrite+ leuk+  · Leukocytosis, WBC = 11 6  · CXR:  Unremarkable  · CT:   · Groundglass density left base with the areas of basilar consolidation at the right lung base may be acute or chronic  May represent acute infiltrate or nonresolved previously noted infiltrate    · Thickened urinary bladder wall     Today, Patient  is clinically improving and has remained afebrile for 4 days  · Urine culture: >100,000 Klebsiella varicolla   · COVID negative, Cdiff negative  · Blood culture:  No growth at 72 hours  · Eosinophilia:  1 16 from 1 86, 1 7 and 1 2  · No rash  · Rule out parasitic infection    Plan:  · Continue IV ceftriaxone 1 g q 12h, day 5 of 7  · Hold IV fluids as patient is hypernatremic at 149 on 09/04/2020  · Follow-up ova parasites, occult   · Respiratory protocol  · Follow-up CBC with differential      Acute cystitis without hematuria  Assessment & Plan  POA, patient is on daily Macrodantin as patient has a history of colonizer with Klebsiella, urine micro and urinalysis show UTI    · CT:  Thickened bladder wall likely due to acute cystitis  · History of UTI, Enterobacter    Urine culture shows > 100,000 Klebsiella varicola and on appropriate antibiotics  Plan:  · Continue IV ceftriaxone 1 g Q 12, day 5 of 7  · Hold home Macrodantin    Dysphagia  Assessment & Plan  POA, the Video Barium Study performed on 08/04/2020 showed "significant oropharyngeal dysphagia characterized primarily by slow, weak tongue "pump" transfer, delayed swallowing initiation, incomplete airway protection and WITH NO RESPONSE TO PENETRATION OR TO PASSIVE ASPIRATION OVER TIME TODAY"    Plan:   · Continue modified dysphagia diet, pureed, pudding thick    Cerebral palsy ([facility] [address] )  Assessment & Plan  POA, patient mentates at baseline, has muscular rigidity secondary to cerebral palsy  · Baseline spastic quadriparesis and intellectual disability    Plan:  · Continue baclofen 10 mg t i d  For spasticity    Bipolar disorder ([facility] [address] )  Assessment & Plan  POA, bipolar disorder controlled with Abilify 30 mg q h s  and Depakote 250 mg/5ml 10 mL p o  B i d  Plan:  Continue home medications    Gastroesophageal reflux disease without esophagitis  Assessment & Plan  POA, GERD controlled on PPI  Plan:  Protonix 20 mg q d  Hypothyroidism  Assessment & Plan  POA, controlled, Synthroid 25 mcg q a m        Plan:  · Continue home Synthroid       Iron deficiency anemia  Assessment & Plan  POA, patient has history of iron deficiency anemia and is on home ferrous sulfate 325 mg b i d   · Hg = 11 1      Plan:  · Continue iron supplementation  · F/u CBC, Hg    Type 2 diabetes mellitus, without long-term current use of insulin [facility])  Assessment & Plan  Lab Results   Component Value Date    HGBA1C 5 8 (H) 08/01/2020       Recent Labs     09/05/20  1119 09/05/20  1622 09/05/20  2118 09/06/20  0734   POCGLU 265* 247* 211* 210*       Blood Sugar Average: Last 72 hrs:    Plan:  · Hold home metformin  · Insulin sliding scale and Accu-Chek  · Diabetic modified dysphagia diet  · Avoid hypoglycemia          VTE Pharmacologic Prophylaxis:   Pharmacologic: Enoxaparin (Lovenox)  Mechanical VTE Prophylaxis in Place: Yes    Discussions with Specialists or Other Care Team Provider: Nursing      Education and Discussions with Family / Patient:  Melba Caldwell, patient's caregiver, however no answer    Current Length of Stay: 5 day(s)    Current Patient Status: Inpatient     Discharge Plan / Estimated Discharge Date: 2020, group home is not able to take patient any sooner than this    Code Status: Level 1 - Full Code      Subjective:   Per nursing, patient has been stating she wants to go home  She also has not had a bowel movement for a few days  Patient was restarted on home MiraLax  This morning, patient is sitting comfortably in bed  Patient continues to have limited expressive language, however appears to be more alert  Patient denies any pain  Objective:     Vitals:   Temp (24hrs), Av 2 °F (36 8 °C), Min:97 5 °F (36 4 °C), Max:98 7 °F (37 1 °C)    Temp:  [97 5 °F (36 4 °C)-98 7 °F (37 1 °C)] 98 5 °F (36 9 °C)  HR:  [55-94] 60  Resp:  [16] 16  BP: (103-110)/(53-55) 110/54  SpO2:  [96 %-99 %] 99 %  There is no height or weight on file to calculate BMI  Input and Output Summary (last 24 hours): Intake/Output Summary (Last 24 hours) at 2020 0807  Last data filed at 2020 1841  Gross per 24 hour   Intake 124 ml   Output 1297 ml   Net -1173 ml       Physical Exam:     Physical Exam  Vitals signs and nursing note reviewed  Constitutional:       General: She is not in acute distress  Appearance: She is ill-appearing (Chronically)  HENT:      Head: Normocephalic  Right Ear: External ear normal       Left Ear: External ear normal       Nose: Nose normal       Mouth/Throat:      Mouth: Mucous membranes are moist       Pharynx: Oropharynx is clear  Eyes:      Conjunctiva/sclera: Conjunctivae normal       Pupils: Pupils are equal, round, and reactive to light  Neck:      Musculoskeletal: Neck supple  No muscular tenderness  Cardiovascular:      Rate and Rhythm: Regular rhythm  Bradycardia present  Heart sounds: Murmur (Diastolic) present  Pulmonary:      Effort: Pulmonary effort is normal    Abdominal:      General: Abdomen is flat  Palpations: Abdomen is soft  Musculoskeletal:      Right lower leg: No edema  Left lower leg: No edema  Skin:     General: Skin is warm and dry  Capillary Refill: Capillary refill takes less than 2 seconds  Neurological:      Mental Status: She is alert  Mental status is at baseline  Additional Data:     Labs:    Results from last 7 days   Lab Units 09/04/20  0548 09/03/20  0536   WBC Thousand/uL 8 90 11 16*   HEMOGLOBIN g/dL 9 1* 8 6*   HEMATOCRIT % 28 1* 26 2*   PLATELETS Thousands/uL 123* 118*   NEUTROS PCT %  --  53   LYMPHS PCT %  --  22   LYMPHO PCT % 31  --    MONOS PCT %  --  6   MONO PCT % 4  --    EOS PCT % 13* 17*     Results from last 7 days   Lab Units 09/04/20  0548  09/02/20  0535   POTASSIUM mmol/L 3 9   < > 3 8   CHLORIDE mmol/L 113*   < > 108   CO2 mmol/L 30   < > 28   BUN mg/dL 15   < > 16   CREATININE mg/dL 0 94   < > 1 05   CALCIUM mg/dL 8 1*   < > 8 0*   ALK PHOS U/L  --   --  56   ALT U/L  --   --  10*   AST U/L  --   --  10    < > = values in this interval not displayed  Results from last 7 days   Lab Units 09/01/20  1244   INR  0 98       * I Have Reviewed All Lab Data Listed Above  * Additional Pertinent Lab Tests Reviewed: [facility]    Imaging:    Imaging Reports Reviewed Today Include:  None  Imaging Personally Reviewed by Myself Includes:  None  Recent Cultures (last 7 days):     Results from last 7 days   Lab Units 09/02/20  0004 09/01/20  1316 09/01/20  1244   BLOOD CULTURE   --   --  No Growth After 4 Days     URINE CULTURE   --  >100,000 cfu/ml Klebsiella variicola*  --    C DIFF TOXIN B  Negative  --   --        Last 24 Hours Medication List:   Current Facility-Administered Medications   Medication Dose Route Frequency Provider Last Rate    acetaminophen  650 mg Oral Q6H PRN Ivanna Segura MD      ammonium lactate   Topical BID PRN Ivanna Segura MD      ARIPiprazole  30 mg Oral Q24H Ivanna Segura MD      baclofen  10 mg Oral TID Ivanna Segura MD      calcium carbonate-vitamin D  1 tablet Oral BID With Meals Ivanna Segura MD      cefTRIAXone  1,000 mg Intravenous Q24H Kelsie Ordoñez MD 1,000 mg (09/05/20 1017)    cholecalciferol  2,000 Units Oral Daily Ivanna Segura MD      citalopram  40 mg Oral QAM Ivanna Segura MD      enoxaparin  40 mg Subcutaneous Daily Ivanna Segura MD      ferrous sulfate  325 mg Oral BID With Meals Ivanna Segura MD      insulin lispro  1-5 Units Subcutaneous TID With Meals Jerrell Olson MD      levothyroxine  25 mcg Oral Early Morning Ivanna Segura MD      LORazepam  0 5 mg Oral HS Ivanna Segura MD      ondansetron  4 mg Intravenous Q6H PRN Ivanna Segura MD      oxybutynin  5 mg Oral TID Kanwal Conn MD      pantoprazole  20 mg Oral Early Morning Ivanna Segura MD      pravastatin  40 mg Oral Daily With Bre Camargo MD      sodium chloride (PF)  3 mL Intravenous Q1H PRN Ivanna Segura MD      traZODone  100 mg Oral HS Ivanna Segura MD      valproic acid  500 mg Oral BID Ivanna Segura MD          Today, Patient Was Seen By: Ivanna Segura MD    ** Please Note: This note has been constructed using a voice recognition system   **

## 2020-09-06 NOTE — ASSESSMENT & PLAN NOTE
POA, patient is on daily Macrodantin as patient has a history of colonizer with Klebsiella, urine micro and urinalysis show UTI  · CT:  Thickened bladder wall likely due to acute cystitis  · History of UTI, Enterobacter    Urine culture shows > 100,000 Klebsiella varicola and on appropriate antibiotics    Plan:  · Continue IV ceftriaxone 1 g Q 12, day 5 of 7  · Hold home Macrodantin

## 2020-09-06 NOTE — ASSESSMENT & PLAN NOTE
POA, the Video Barium Study performed on 08/04/2020 showed "significant oropharyngeal dysphagia characterized primarily by slow, weak tongue "pump" transfer, delayed swallowing initiation, incomplete airway protection and WITH NO RESPONSE TO PENETRATION OR TO PASSIVE ASPIRATION OVER TIME TODAY"    Plan:   · Continue modified dysphagia diet, pureed, pudding thick

## 2020-09-06 NOTE — UTILIZATION REVIEW
Continued Stay Review    Date: 9/6/2020                        Current Patient Class: inpatient  Current Level of Care: med surg     HPI:60 y o  female initially admitted on 9/1/2020 inpatient due to sepsis suspect secondary to UTI, rule out pneumonia  History of cerebral palsy, frequent UTI on daily macrobid, DM, hypothyroid and dysphagia  Presented with fevers and loose to watery BM  C diff negative  Assessment/Plan: on 9/6/2020 Patient without BM and started on Miralax  More alert  On exam bradycardic  with + urine culture of Klebsiella varicolla, remains on IV ceftriaxone  Macrodantin on hold  Group home will not accept back until 9/8/2020     Pertinent Labs/Diagnostic Results: 9/1/2020 CT abdomen No intra-abdominal fluid collection   No bowel obstruction   Postsurgical changes from prior segmental colonic resection   Rounded 2 2 cm calcific density seen within the right side of the transverse colon may be due to enterolith    Groundglass density left base with the areas of basilar consolidation at the right lung base may be acute or chronic   May represent acute infiltrate or nonresolved previously noted infiltrate   Follow-up chest CT suggested in 2 months   Thickened urinary bladder wall, correlate with urinary evaluation for cystitis/urinary tract infection     9/1/2020 CxR - No acute cardiopulmonary disease   No change from prior       9/1/2020 EKG Normal sinus rhythm  Low voltage QRS  Nonspecific ST abnormality  Abnormal ECG  When compared with ECG of 26-JUL-2020 12:00,  No significant change since prior tracing        Results from last 7 days   Lab Units 09/02/20  1006   SARS-COV-2  Negative     Results from last 7 days   Lab Units 09/06/20  0953 09/04/20  0548 09/03/20  0536 09/02/20  0535 09/01/20  1244   WBC Thousand/uL 7 34 8 90 11 16* 13 62* 11 60*   HEMOGLOBIN g/dL 9 0* 9 1* 8 6* 9 5* 11 1*   HEMATOCRIT % 28 2* 28 1* 26 2* 28 6* 33 9*   PLATELETS Thousands/uL 141* 123* 118* 140* 184 NEUTROS ABS Thousands/µL  --   --  6 00 9 05* 7 45   BANDS PCT %  --  1  --   --   --          Results from last 7 days   Lab Units 09/06/20  0953 09/04/20  0548 09/03/20  0536 09/02/20  0535 09/01/20  1244   SODIUM mmol/L 147* 149* 148* 142 139   POTASSIUM mmol/L 4 2 3 9 3 8 3 8 4 1   CHLORIDE mmol/L 107 113* 115* 108 101   CO2 mmol/L 36* 30 26 28 31   ANION GAP mmol/L 4 6 7 6 7   BUN mg/dL 18 15 16 16 12   CREATININE mg/dL 0 99 0 94 0 85 1 05 0 98   EGFR ml/min/1 73sq m 63 67 76 59 64   CALCIUM mg/dL 9 1 8 1* 7 7* 8 0* 8 9   MAGNESIUM mg/dL  --   --   --   --  1 6     Results from last 7 days   Lab Units 09/02/20  0535 09/01/20  1244   AST U/L 10 10   ALT U/L 10* 12   ALK PHOS U/L 56 68   TOTAL PROTEIN g/dL 5 2* 6 1*   ALBUMIN g/dL 1 6* 2 1*   TOTAL BILIRUBIN mg/dL 0 11* 0 10*     Results from last 7 days   Lab Units 09/06/20  1540 09/06/20  1115 09/06/20  0734 09/05/20  2118 09/05/20  1622 09/05/20  1119 09/05/20  0745 09/04/20  2008 09/04/20  1551 09/04/20  1129 09/04/20  0804 09/03/20  2056   POC GLUCOSE mg/dl 203* 278* 210* 211* 247* 265* 141* 292* 169* 208* 109 299*     Results from last 7 days   Lab Units 09/06/20  0953 09/04/20  0548 09/03/20  0536 09/02/20  0535 09/01/20  1244   GLUCOSE RANDOM mg/dL 159* 113 82 101 164*     Results from last 7 days   Lab Units 09/01/20  1244   PROTIME seconds 13 1   INR  0 98   PTT seconds 23     Results from last 7 days   Lab Units 09/02/20  0535   TSH 3RD GENERATON uIU/mL 0 714     Results from last 7 days   Lab Units 09/02/20  0535 09/01/20  1244   PROCALCITONIN ng/ml 4 49* 0 19     Results from last 7 days   Lab Units 09/01/20  1820 09/01/20  1244   LACTIC ACID mmol/L 1 0 4 9*       Results from last 7 days   Lab Units 09/06/20  0953   FERRITIN ng/mL 382       Results from last 7 days   Lab Units 09/01/20  1316   CLARITY UA  Clear   COLOR UA  Yellow   SPEC GRAV UA  1 015   PH UA  7 5   GLUCOSE UA mg/dl Negative   KETONES UA mg/dl Negative   BLOOD UA  Trace-Intact* PROTEIN UA mg/dl Negative   NITRITE UA  Positive*   BILIRUBIN UA  Negative   UROBILINOGEN UA E U /dl 0 2   LEUKOCYTES UA  Small*   WBC UA /hpf 10-20*   RBC UA /hpf 1-2*   BACTERIA UA /hpf Innumerable*   EPITHELIAL CELLS WET PREP /hpf None Seen       Results from last 7 days   Lab Units 09/02/20  0004   C DIFF TOXIN B  Negative     Results from last 7 days   Lab Units 09/01/20  1316 09/01/20  1244   BLOOD CULTURE   --  No Growth After 5 Days     URINE CULTURE  >100,000 cfu/ml Klebsiella variicola*  --      Results from last 7 days   Lab Units 09/04/20  0548   TOTAL COUNTED  100       Vital Signs:   09/06/20 1534   98 2 °F (36 8 °C)   75   16   153/69      91 %         None (Room air)   Lying    09/06/20 0700   98 5 °F (36 9 °C)   60   16   110/54      99 %   24   1 L/min   Nasal cannula   Sitting    09/05/20 2224   98 7 °F (37 1 °C)   55   16   108/53      99 %   24   1 L/min   Nasal cannula           Medications:   Scheduled Medications:  ARIPiprazole, 30 mg, Oral, Q24H  baclofen, 10 mg, Oral, TID  calcium carbonate-vitamin D, 1 tablet, Oral, BID With Meals  cefTRIAXone, 1,000 mg, Intravenous, Q24H  cholecalciferol, 2,000 Units, Oral, Daily  citalopram, 40 mg, Oral, QAM  enoxaparin, 40 mg, Subcutaneous, Daily  ferrous sulfate, 325 mg, Oral, BID With Meals  insulin lispro, 1-5 Units, Subcutaneous, TID With Meals  levothyroxine, 25 mcg, Oral, Early Morning  LORazepam, 0 5 mg, Oral, HS  oxybutynin, 5 mg, Oral, TID  pantoprazole, 20 mg, Oral, Early Morning  pravastatin, 40 mg, Oral, Daily With Dinner  senna-docusate sodium, 1 tablet, Oral, HS  traZODone, 100 mg, Oral, HS  valproic acid, 500 mg, Oral, BID      Continuous IV Infusions: dc on 9/4/2020      PRN Meds: not used   acetaminophen, 650 mg, Oral, Q6H PRN  ammonium lactate, , Topical, BID PRN  ondansetron, 4 mg, Intravenous, Q6H PRN  polyethylene glycol, 17 g, Oral, Daily PRN  sodium chloride (PF), 3 mL, Intravenous, Q1H PRN        Discharge Plan: PT recommends return to group home when medically stable  Network Utilization Review Department  [email or web address]  ATTENTION: Please call with any questions or concerns to [phone] and carefully listen to the prompts so that you are directed to the right person  All voicemails are confidential   Diamond Horton all requests for admission clinical reviews, approved or denied determinations and any other requests to dedicated fax number below belonging to the campus where the patient is receiving treatment   List of dedicated fax numbers for the Facilities:  [address] DENIALS (Administrative/Medical Necessity) [phone]   [address] (Maternity/NICU/Pediatrics) [phone]   Cleve Miguel [phone]   Jerrica Espinoza [phone]   Ted Garza [phone]   [address] [facility]  [phone]   [address] [place] [address] [address] [phone]   Ryan Fraziers [phone]   [address]  [address] [phone]

## 2020-09-06 NOTE — ASSESSMENT & PLAN NOTE
POA, patient has history of iron deficiency anemia and is on home ferrous sulfate 325 mg b i d   · Hg = 11 1      Plan:  · Continue iron supplementation  · F/u CBC, Hg

## 2020-09-07 LAB
GLUCOSE SERPL-MCNC: 113 MG/DL (ref 65–140)
GLUCOSE SERPL-MCNC: 227 MG/DL (ref 65–140)
GLUCOSE SERPL-MCNC: 230 MG/DL (ref 65–140)
GLUCOSE SERPL-MCNC: 291 MG/DL (ref 65–140)

## 2020-09-07 PROCEDURE — 99232 SBSQ HOSP IP/OBS MODERATE 35: CPT | Performed by: INTERNAL MEDICINE

## 2020-09-07 PROCEDURE — 82948 REAGENT STRIP/BLOOD GLUCOSE: CPT

## 2020-09-07 RX ORDER — BISACODYL 10 MG
10 SUPPOSITORY, RECTAL RECTAL ONCE
Status: COMPLETED | OUTPATIENT
Start: 2020-09-07 | End: 2020-09-07

## 2020-09-07 RX ADMIN — DOCUSATE SODIUM 50 MG AND SENNOSIDES 8.6 MG 1 TABLET: 8.6; 5 TABLET, FILM COATED ORAL at 21:36

## 2020-09-07 RX ADMIN — VALPROIC ACID 500 MG: 250 SOLUTION ORAL at 09:15

## 2020-09-07 RX ADMIN — ENOXAPARIN SODIUM 40 MG: 100 INJECTION SUBCUTANEOUS at 09:12

## 2020-09-07 RX ADMIN — FERROUS SULFATE TAB 325 MG (65 MG ELEMENTAL FE) 325 MG: 325 (65 FE) TAB at 09:13

## 2020-09-07 RX ADMIN — PANTOPRAZOLE SODIUM 20 MG: 20 TABLET, DELAYED RELEASE ORAL at 05:34

## 2020-09-07 RX ADMIN — OYSTER SHELL CALCIUM WITH VITAMIN D 1 TABLET: 500; 200 TABLET, FILM COATED ORAL at 16:04

## 2020-09-07 RX ADMIN — LEVOTHYROXINE SODIUM 25 MCG: 25 TABLET ORAL at 05:33

## 2020-09-07 RX ADMIN — BACLOFEN 10 MG: 10 TABLET ORAL at 16:04

## 2020-09-07 RX ADMIN — DEXTROSE AND SODIUM CHLORIDE 500 ML: 5; .9 INJECTION, SOLUTION INTRAVENOUS at 23:47

## 2020-09-07 RX ADMIN — OYSTER SHELL CALCIUM WITH VITAMIN D 1 TABLET: 500; 200 TABLET, FILM COATED ORAL at 09:13

## 2020-09-07 RX ADMIN — VITAMIN D, TAB 1000IU (100/BT) 2000 UNITS: 25 TAB at 09:13

## 2020-09-07 RX ADMIN — OXYBUTYNIN CHLORIDE 5 MG: 5 TABLET ORAL at 09:13

## 2020-09-07 RX ADMIN — VALPROIC ACID 500 MG: 250 SOLUTION ORAL at 17:21

## 2020-09-07 RX ADMIN — BACLOFEN 10 MG: 10 TABLET ORAL at 21:36

## 2020-09-07 RX ADMIN — BACLOFEN 10 MG: 10 TABLET ORAL at 09:13

## 2020-09-07 RX ADMIN — CEFTRIAXONE SODIUM 1000 MG: 10 INJECTION, POWDER, FOR SOLUTION INTRAVENOUS at 09:57

## 2020-09-07 RX ADMIN — CITALOPRAM HYDROBROMIDE 40 MG: 20 TABLET ORAL at 09:13

## 2020-09-07 RX ADMIN — OXYBUTYNIN CHLORIDE 5 MG: 5 TABLET ORAL at 21:36

## 2020-09-07 RX ADMIN — ARIPIPRAZOLE 30 MG: 10 TABLET ORAL at 21:36

## 2020-09-07 RX ADMIN — OXYBUTYNIN CHLORIDE 5 MG: 5 TABLET ORAL at 16:04

## 2020-09-07 RX ADMIN — TRAZODONE HYDROCHLORIDE 100 MG: 100 TABLET ORAL at 21:36

## 2020-09-07 RX ADMIN — LORAZEPAM 0.5 MG: 0.5 TABLET ORAL at 21:36

## 2020-09-07 RX ADMIN — PRAVASTATIN SODIUM 40 MG: 40 TABLET ORAL at 16:04

## 2020-09-07 RX ADMIN — FERROUS SULFATE TAB 325 MG (65 MG ELEMENTAL FE) 325 MG: 325 (65 FE) TAB at 16:04

## 2020-09-07 RX ADMIN — BISACODYL 10 MG: 10 SUPPOSITORY RECTAL at 10:13

## 2020-09-07 NOTE — ASSESSMENT & PLAN NOTE
POA, patient is on daily Macrodantin as patient has a history of colonizer with Klebsiella, urine micro and urinalysis show UTI  · CT:  Thickened bladder wall likely due to acute cystitis  · History of UTI, Enterobacter    Urine culture shows > 100,000 Klebsiella varicola and on appropriate antibiotics    Plan:  · Continue IV ceftriaxone, last dose tomorrow morning  · Hold home Macrodantin

## 2020-09-07 NOTE — PROGRESS NOTES
Progress Note - Oumou Webster 1960, 64 y o  female MRN: [id number]    Unit/Bed#: S -01 Encounter: [id number]    Primary Care Provider: Benjie Waite DO   Date and time admitted to hospital: 9/1/2020 10:10 AM        * Severe sepsis [facility])  Assessment & Plan  51-year-old female with history of recurrent UTIs on daily nitrofurantoin, cerebral palsy, bipolar, GERD, hemicolectomy for volvulus, presents from group home with caregiver with 3 days of intermittent fevers, T-max 101 2°, increased loose to watery, nonbloody bowel movements  Patient meets severe sepsis criteria likely secondary to UTI  C diff toxin negative  Cannot rule out pneumonia at this point  · Lactic acid: 4 9 on admission; improved to 1 0 on repeat  · Tachycardia greater than 90 on admission  · BP 89/57, however per caregiver, this is close to baseline  · UA/Urine Micro = bacteria innumerable, nitrite+ leuk+  · Leukocytosis, WBC = 11 6  · CXR:  Unremarkable  · CT:   · Groundglass density left base with the areas of basilar consolidation at the right lung base may be acute or chronic  May represent acute infiltrate or nonresolved previously noted infiltrate    · Thickened urinary bladder wall     Today, Patient  is clinically improving and has remained afebrile for 4 days  · Urine culture: >100,000 Klebsiella varicolla   · COVID negative, Cdiff negative  · Blood culture:  No growth at 72 hours  · Ova/ parasite:  Negative    Plan:  · Continue IV ceftriaxone, last dose tomorrow morning  · Continue to hold IV fluid maintenance as patient is hypernatremic at 147 on 09/07/2020  · Patient has history of hypernatremia on IVF  · Respiratory protocol  · Follow-up CBC with differential  · Discharge tomorrow back to group home      Acute cystitis without hematuria  Assessment & Plan  POA, patient is on daily Macrodantin as patient has a history of colonizer with Klebsiella, urine micro and urinalysis show UTI    · CT:  Thickened bladder wall likely due to acute cystitis  · History of UTI, Enterobacter    Urine culture shows > 100,000 Klebsiella varicola and on appropriate antibiotics  Plan:  · Continue IV ceftriaxone, last dose tomorrow morning  · Hold home Macrodantin    Underweight  Assessment & Plan  A POA, underweight adult as evidence by BMI of 19 35 with history cerebral palsy, dysphagia, spastic quadriplegia, noted to have gained weight since last admission  Patient requires continuation of pureed diet with pudding thick liquids, full assistance for meals and monitoring of p o  Intake  · 07/26/2020 weight 89 lb  · 01/21/2020 weight 107 lb    Plan:  · Continue modified dysphagia diet, pureed thick, pudding thick  · Continue nutritional supplementation    Dysphagia  Assessment & Plan  POA, the Video Barium Study performed on 08/04/2020 showed "significant oropharyngeal dysphagia characterized primarily by slow, weak tongue "pump" transfer, delayed swallowing initiation, incomplete airway protection and WITH NO RESPONSE TO PENETRATION OR TO PASSIVE ASPIRATION OVER TIME TODAY"    Plan:   · Continue modified dysphagia diet, pureed, pudding thick    Cerebral palsy ([facility] )  Assessment & Plan  POA, patient mentates at baseline, has muscular rigidity secondary to cerebral palsy  · Baseline spastic quadriparesis and intellectual disability    Plan:  · Continue baclofen 10 mg t i d  For spasticity    Bipolar disorder ([facility] )  Assessment & Plan  POA, bipolar disorder controlled with Abilify 30 mg q h s  and Depakote 250 mg/5ml 10 mL p o  B i d  Plan:  Continue home medications    Gastroesophageal reflux disease without esophagitis  Assessment & Plan  POA, GERD controlled on PPI  Plan:  · Continue Protonix 20 mg q d  Hypothyroidism  Assessment & Plan  POA, controlled, Synthroid 25 mcg q a m        Plan:  · Continue home Synthroid       Iron deficiency anemia  Assessment & Plan  POA, patient has history of iron deficiency anemia and is on home ferrous sulfate 325 mg b i d   · Hg = 11 1    During this admission, patient's hemoglobin has ranged from 8 6-9 5  Plan:  · Continue iron supplementation  · F/u CBC    Type 2 diabetes mellitus, without long-term current use of insulin [facility])  Assessment & Plan  Lab Results   Component Value Date    HGBA1C 5 8 (H) 2020       Recent Labs     20  0734 20  1115 20  1540 20   POCGLU 210* 278* 203* 235*       Blood Sugar Average: Last 72 hrs:    Plan:  · Hold home metformin  · Insulin sliding scale and Accu-Chek  · Diabetic modified dysphagia diet  · Avoid hypoglycemia        VTE Pharmacologic Prophylaxis:   Pharmacologic: Enoxaparin (Lovenox)  Mechanical VTE Prophylaxis in Place: Yes    Discussions with Specialists or Other Care Team Provider:   Nursing    Education and Discussions with Family / Patient:  Updated patient's caregiver, over the phone    Current Length of Stay: 6 day(s)    Current Patient Status: Inpatient     Discharge Plan / Estimated Discharge Date:  Tomorrow    Code Status: Level 1 - Full Code      Subjective:   Per nursing, patient has not had charted bowel movement for least 4 days now     Objective:     Vitals:   Temp (24hrs), Av 3 °F (36 8 °C), Min:98 2 °F (36 8 °C), Max:98 5 °F (36 9 °C)    Temp:  [98 2 °F (36 8 °C)-98 5 °F (36 9 °C)] 98 5 °F (36 9 °C)  HR:  [56-75] 56  Resp:  [16] 16  BP: ()/(52-69) 100/58  SpO2:  [91 %-95 %] 95 %  There is no height or weight on file to calculate BMI  Input and Output Summary (last 24 hours): Intake/Output Summary (Last 24 hours) at 2020 1138  Last data filed at 2020 0800  Gross per 24 hour   Intake 340 ml   Output 1050 ml   Net -710 ml       Physical Exam:     Physical Exam  Constitutional:       Appearance: She is ill-appearing (Chronically)  HENT:      Head: Normocephalic        Right Ear: External ear normal       Left Ear: External ear normal       Nose: Nose normal       Mouth/Throat: Mouth: Mucous membranes are moist    Eyes:      Conjunctiva/sclera: Conjunctivae normal       Pupils: Pupils are equal, round, and reactive to light  Cardiovascular:      Rate and Rhythm: Normal rate and regular rhythm  Heart sounds: Murmur present  Pulmonary:      Effort: Pulmonary effort is normal    Abdominal:      General: Abdomen is flat  Bowel sounds are normal       Palpations: Abdomen is soft  Musculoskeletal:      Right lower leg: No edema  Left lower leg: No edema  Comments: Spasticity   Skin:     General: Skin is warm and dry  Capillary Refill: Capillary refill takes less than 2 seconds  Neurological:      Mental Status: She is alert  Mental status is at baseline  Comments: Tardive dyskinesia             Additional Data:     Labs:    Results from last 7 days   Lab Units 09/06/20  0953 09/04/20  0548 09/03/20  0536   WBC Thousand/uL 7 34 8 90 11 16*   HEMOGLOBIN g/dL 9 0* 9 1* 8 6*   HEMATOCRIT % 28 2* 28 1* 26 2*   PLATELETS Thousands/uL 141* 123* 118*   NEUTROS PCT %  --   --  53   LYMPHS PCT %  --   --  22   LYMPHO PCT %  --  31  --    MONOS PCT %  --   --  6   MONO PCT %  --  4  --    EOS PCT %  --  13* 17*     Results from last 7 days   Lab Units 09/06/20  0953  09/02/20  0535   POTASSIUM mmol/L 4 2   < > 3 8   CHLORIDE mmol/L 107   < > 108   CO2 mmol/L 36*   < > 28   BUN mg/dL 18   < > 16   CREATININE mg/dL 0 99   < > 1 05   CALCIUM mg/dL 9 1   < > 8 0*   ALK PHOS U/L  --   --  56   ALT U/L  --   --  10*   AST U/L  --   --  10    < > = values in this interval not displayed  Results from last 7 days   Lab Units 09/01/20  1244   INR  0 98       * I Have Reviewed All Lab Data Listed Above  * Additional Pertinent Lab Tests Reviewed:  Kirsten 68 Admission Reviewed    Imaging:    Imaging Reports Reviewed Today Include:  None  Imaging Personally Reviewed by Myself Includes:  None  Recent Cultures (last 7 days):     Results from last 7 days   Lab Units 09/02/20  0004 09/01/20  1316 09/01/20  1244   BLOOD CULTURE   --   --  No Growth After 5 Days  URINE CULTURE   --  >100,000 cfu/ml Klebsiella variicola*  --    C DIFF TOXIN B  Negative  --   --        Last 24 Hours Medication List:   Current Facility-Administered Medications   Medication Dose Route Frequency Provider Last Rate    acetaminophen  650 mg Oral Q6H PRN Merlin Gray, MD      ammonium lactate   Topical BID PRN Merlin Gray, MD      ARIPiprazole  30 mg Oral Q24H Merlin Gray, MD      baclofen  10 mg Oral TID Merlin Gray, MD      calcium carbonate-vitamin D  1 tablet Oral BID With Meals Merlin Gray, MD      cefTRIAXone  1,000 mg Intravenous Q24H Merlin Gray, MD 1,000 mg (09/07/20 0957)    cholecalciferol  2,000 Units Oral Daily Merlin Gray, MD      citalopram  40 mg Oral QAM Merlin Gray, MD      enoxaparin  40 mg Subcutaneous Daily Merlin Gray, MD      ferrous sulfate  325 mg Oral BID With Meals Merlin Gray, MD      insulin lispro  1-5 Units Subcutaneous TID With Meals Merlin Gray, MD      levothyroxine  25 mcg Oral Early Morning Merlin Gray, MD      LORazepam  0 5 mg Oral HS Merlin Gray, MD      ondansetron  4 mg Intravenous Q6H PRN Merlin Gray, MD      oxybutynin  5 mg Oral TID Merlin Gray, MD      pantoprazole  20 mg Oral Early Morning Merlin Gray, MD      polyethylene glycol  17 g Oral Daily PRN Merlin Gray, MD      pravastatin  40 mg Oral Daily With Magdy Scott MD      senna-docusate sodium  1 tablet Oral HS Merlin Gray, MD      sodium chloride (PF)  3 mL Intravenous Q1H PRN Merlin Gray, MD      traZODone  100 mg Oral HS Merlin Gray, MD      valproic acid  500 mg Oral BID Merlin Gray, MD          Today, Patient Was Seen By: Merlin Gray, MD    ** Please Note: This note has been constructed using a voice recognition system   **

## 2020-09-07 NOTE — ASSESSMENT & PLAN NOTE
62-year-old female with history of recurrent UTIs on daily nitrofurantoin, cerebral palsy, bipolar, GERD, hemicolectomy for volvulus, presents from group home with caregiver with 3 days of intermittent fevers, T-max 101 2°, increased loose to watery, nonbloody bowel movements  Patient meets severe sepsis criteria likely secondary to UTI  C diff toxin negative  Cannot rule out pneumonia at this point  · Lactic acid: 4 9 on admission; improved to 1 0 on repeat  · Tachycardia greater than 90 on admission  · BP 89/57, however per caregiver, this is close to baseline  · UA/Urine Micro = bacteria innumerable, nitrite+ leuk+  · Leukocytosis, WBC = 11 6  · CXR:  Unremarkable  · CT:   · Groundglass density left base with the areas of basilar consolidation at the right lung base may be acute or chronic    May represent acute infiltrate or nonresolved previously noted infiltrate    · Thickened urinary bladder wall     Today, Patient  is clinically improving and has remained afebrile for 4 days  · Urine culture: >100,000 Klebsiella varicolla   · COVID negative, Cdiff negative  · Blood culture:  No growth at 72 hours  · Ova/ parasite:  Negative    Plan:  · Continue IV ceftriaxone, last dose tomorrow morning  · Continue to hold IV fluid maintenance as patient is hypernatremic at 147 on 09/07/2020  · Patient has history of hypernatremia on IVF  · Respiratory protocol  · Follow-up CBC with differential  · Discharge tomorrow back to group home

## 2020-09-07 NOTE — ASSESSMENT & PLAN NOTE
Lab Results   Component Value Date    HGBA1C 5 8 (H) 08/01/2020       Recent Labs     09/06/20  0734 09/06/20  1115 09/06/20  1540 09/06/20 2058   POCGLU 210* 278* 203* 235*       Blood Sugar Average: Last 72 hrs:    Plan:  · Hold home metformin  · Insulin sliding scale and Accu-Chek  · Diabetic modified dysphagia diet  · Avoid hypoglycemia

## 2020-09-07 NOTE — ASSESSMENT & PLAN NOTE
POA, patient has history of iron deficiency anemia and is on home ferrous sulfate 325 mg b i d   · Hg = 11 1    During this admission, patient's hemoglobin has ranged from 8 6-9 5    Plan:  · Continue iron supplementation  · F/u CBC

## 2020-09-08 VITALS
TEMPERATURE: 97.4 F | OXYGEN SATURATION: 94 % | HEART RATE: 51 BPM | DIASTOLIC BLOOD PRESSURE: 52 MMHG | RESPIRATION RATE: 16 BRPM | SYSTOLIC BLOOD PRESSURE: 82 MMHG

## 2020-09-08 PROBLEM — D72.10 EOSINOPHILIA: Status: ACTIVE | Noted: 2020-09-08

## 2020-09-08 LAB
ANION GAP SERPL CALCULATED.3IONS-SCNC: 4 MMOL/L (ref 4–13)
BASOPHILS # BLD MANUAL: 0 THOUSAND/UL (ref 0–0.1)
BASOPHILS NFR MAR MANUAL: 0 % (ref 0–1)
BUN SERPL-MCNC: 24 MG/DL (ref 5–25)
CALCIUM SERPL-MCNC: 9.2 MG/DL (ref 8.3–10.1)
CHLORIDE SERPL-SCNC: 108 MMOL/L (ref 100–108)
CO2 SERPL-SCNC: 35 MMOL/L (ref 21–32)
CREAT SERPL-MCNC: 0.97 MG/DL (ref 0.6–1.3)
EOSINOPHIL # BLD MANUAL: 1.64 THOUSAND/UL (ref 0–0.4)
EOSINOPHIL NFR BLD MANUAL: 18 % (ref 0–6)
ERYTHROCYTE [DISTWIDTH] IN BLOOD BY AUTOMATED COUNT: 12.6 % (ref 11.6–15.1)
GFR SERPL CREATININE-BSD FRML MDRD: 65 ML/MIN/1.73SQ M
GLUCOSE SERPL-MCNC: 183 MG/DL (ref 65–140)
GLUCOSE SERPL-MCNC: 196 MG/DL (ref 65–140)
GLUCOSE SERPL-MCNC: 217 MG/DL (ref 65–140)
HCT VFR BLD AUTO: 28.8 % (ref 34.8–46.1)
HEMOCCULT STL QL: NEGATIVE
HGB BLD-MCNC: 9 G/DL (ref 11.5–15.4)
LYMPHOCYTES # BLD AUTO: 2.09 THOUSAND/UL (ref 0.6–4.47)
LYMPHOCYTES # BLD AUTO: 23 % (ref 14–44)
MACROCYTES BLD QL AUTO: PRESENT
MCH RBC QN AUTO: 35.4 PG (ref 26.8–34.3)
MCHC RBC AUTO-ENTMCNC: 31.3 G/DL (ref 31.4–37.4)
MCV RBC AUTO: 113 FL (ref 82–98)
MONOCYTES # BLD AUTO: 0.73 THOUSAND/UL (ref 0–1.22)
MONOCYTES NFR BLD: 8 % (ref 4–12)
MYELOCYTES NFR BLD MANUAL: 1 % (ref 0–1)
NEUTROPHILS # BLD MANUAL: 3.64 THOUSAND/UL (ref 1.85–7.62)
NEUTS SEG NFR BLD AUTO: 40 % (ref 43–75)
NRBC BLD AUTO-RTO: 0 /100 WBCS
PLATELET # BLD AUTO: 150 THOUSANDS/UL (ref 149–390)
PLATELET BLD QL SMEAR: ADEQUATE
PMV BLD AUTO: 11.3 FL (ref 8.9–12.7)
POTASSIUM SERPL-SCNC: 4.4 MMOL/L (ref 3.5–5.3)
RBC # BLD AUTO: 2.54 MILLION/UL (ref 3.81–5.12)
SARS-COV-2 RNA RESP QL NAA+PROBE: NEGATIVE
SODIUM SERPL-SCNC: 147 MMOL/L (ref 136–145)
TOTAL CELLS COUNTED SPEC: 100
VARIANT LYMPHS # BLD AUTO: 10 %
WBC # BLD AUTO: 9.09 THOUSAND/UL (ref 4.31–10.16)

## 2020-09-08 PROCEDURE — 99238 HOSP IP/OBS DSCHRG MGMT 30/<: CPT | Performed by: INTERNAL MEDICINE

## 2020-09-08 PROCEDURE — 85027 COMPLETE CBC AUTOMATED: CPT | Performed by: PSYCHIATRY & NEUROLOGY

## 2020-09-08 PROCEDURE — 82948 REAGENT STRIP/BLOOD GLUCOSE: CPT

## 2020-09-08 PROCEDURE — 80048 BASIC METABOLIC PNL TOTAL CA: CPT | Performed by: PSYCHIATRY & NEUROLOGY

## 2020-09-08 PROCEDURE — 87635 SARS-COV-2 COVID-19 AMP PRB: CPT | Performed by: PSYCHIATRY & NEUROLOGY

## 2020-09-08 PROCEDURE — 85007 BL SMEAR W/DIFF WBC COUNT: CPT | Performed by: PSYCHIATRY & NEUROLOGY

## 2020-09-08 PROCEDURE — 82272 OCCULT BLD FECES 1-3 TESTS: CPT | Performed by: PSYCHIATRY & NEUROLOGY

## 2020-09-08 RX ORDER — ALBUMIN (HUMAN) 12.5 G/50ML
25 SOLUTION INTRAVENOUS ONCE
Status: COMPLETED | OUTPATIENT
Start: 2020-09-08 | End: 2020-09-08

## 2020-09-08 RX ORDER — MINERAL OIL 100 G/100G
1 OIL RECTAL ONCE
Status: DISCONTINUED | OUTPATIENT
Start: 2020-09-08 | End: 2020-09-08

## 2020-09-08 RX ORDER — NITROFURANTOIN MACROCRYSTALS 50 MG/1
50 CAPSULE ORAL
Qty: 30 CAPSULE | Refills: 0 | Status: SHIPPED | OUTPATIENT
Start: 2020-09-08 | End: 2020-10-08

## 2020-09-08 RX ADMIN — BACLOFEN 10 MG: 10 TABLET ORAL at 10:00

## 2020-09-08 RX ADMIN — CITALOPRAM HYDROBROMIDE 40 MG: 20 TABLET ORAL at 10:00

## 2020-09-08 RX ADMIN — OXYBUTYNIN CHLORIDE 5 MG: 5 TABLET ORAL at 10:00

## 2020-09-08 RX ADMIN — OYSTER SHELL CALCIUM WITH VITAMIN D 1 TABLET: 500; 200 TABLET, FILM COATED ORAL at 09:59

## 2020-09-08 RX ADMIN — VALPROIC ACID 500 MG: 250 SOLUTION ORAL at 09:59

## 2020-09-08 RX ADMIN — FERROUS SULFATE TAB 325 MG (65 MG ELEMENTAL FE) 325 MG: 325 (65 FE) TAB at 10:00

## 2020-09-08 RX ADMIN — VITAMIN D, TAB 1000IU (100/BT) 2000 UNITS: 25 TAB at 09:59

## 2020-09-08 RX ADMIN — ALBUMIN (HUMAN) 25 G: 0.25 INJECTION, SOLUTION INTRAVENOUS at 02:46

## 2020-09-08 RX ADMIN — CEFTRIAXONE SODIUM 1000 MG: 10 INJECTION, POWDER, FOR SOLUTION INTRAVENOUS at 10:19

## 2020-09-08 RX ADMIN — ENOXAPARIN SODIUM 40 MG: 100 INJECTION SUBCUTANEOUS at 09:59

## 2020-09-08 NOTE — SOCIAL WORK
KOBY was in contact with [facility] regarding the Pt's readiness for d/c back to the group home today  Shannon Wan reported she will be transporting the Pt upon d/c back to group home and agreed upon a 2pm  time  Shannon Wan reported she will need some paperwork sign by the doctor and also had a question for the doctor prior to d/c  KOBY had made resident Ladan aware of the above

## 2020-09-08 NOTE — INCIDENTAL FINDINGS
The following findings require follow up:  Non-Radiographic finding   Finding: CT   Rounded 2 2 cm calcific density seen within the right side of the transverse colon may be due to enterolith      Groundglass density left base with the areas of basilar consolidation at the right lung base may be acute or chronic  May represent acute infiltrate or nonresolved previously noted infiltrate    Follow-up chest CT suggested in 2 months   Follow up required: with PCP    Follow up should be done within 2 month(s)    Please notify the following clinician to assist with the follow up:   Dr Mike Pollack

## 2020-09-08 NOTE — ASSESSMENT & PLAN NOTE
Lab Results   Component Value Date    HGBA1C 5 8 (H) 08/01/2020       Recent Labs     09/07/20  1545 09/07/20  2106 09/08/20  0728 09/08/20  1109   POCGLU 227* 113 196* 217*       Blood Sugar Average: Last 72 hrs:    Plan:  · Discharge today to group Home  · Restart patient's metformin prior to discharge The patient is a 89y Male complaining of edema.

## 2020-09-08 NOTE — ASSESSMENT & PLAN NOTE
POA, patient is on daily Macrodantin as patient has a history of colonizer with Klebsiella, urine micro and urinalysis show UTI  · CT:  Thickened bladder wall likely due to acute cystitis  · History of UTI, Enterobacter    Patient is clinically improved and completed 8 days of IV antibiotics    Plan:  · Restart Macrodantin on discharge

## 2020-09-08 NOTE — ASSESSMENT & PLAN NOTE
POA, patient has history of iron deficiency anemia and is on home ferrous sulfate 325 mg b i d   · Hg = 11 1    During this admission, patient's hemoglobin has ranged from 8 6-9 5  This morning, patient's hemoglobin is stable    Plan:  · Continue iron supplementation  · Patient to follow up with PCP  · Repeat CBC outpatient in 1 week

## 2020-09-08 NOTE — PLAN OF CARE
Problem: Potential for Falls  Goal: Patient will remain free of falls  Description: INTERVENTIONS:  - Assess patient frequently for physical needs  -  Identify cognitive and physical deficits and behaviors that affect risk of falls  -  Camby fall precautions as indicated by assessment   - Educate patient/family on patient safety including physical limitations  - Instruct patient to call for assistance with activity based on assessment  - Modify environment to reduce risk of injury  - Consider OT/PT consult to assist with strengthening/mobility  Outcome: Progressing     Problem: Prexisting or High Potential for Compromised Skin Integrity  Goal: Skin integrity is maintained or improved  Description: INTERVENTIONS:  - Identify patients at risk for skin breakdown  - Assess and monitor skin integrity  - Assess and monitor nutrition and hydration status  - Monitor labs   - Assess for incontinence   - Turn and reposition patient  - Assist with mobility/ambulation  - Relieve pressure over bony prominences  - Avoid friction and shearing  - Provide appropriate hygiene as needed including keeping skin clean and dry  - Evaluate need for skin moisturizer/barrier cream  - Collaborate with interdisciplinary team   - Patient/family teaching  - Consider wound care consult   Outcome: Progressing     Problem: Nutrition/Hydration-ADULT  Goal: Nutrient/Hydration intake appropriate for improving, restoring or maintaining nutritional needs  Description: Monitor and assess patient's nutrition/hydration status for malnutrition  Collaborate with interdisciplinary team and initiate plan and interventions as ordered  Monitor patient's weight and dietary intake as ordered or per policy  Utilize nutrition screening tool and intervene as necessary  Determine patient's food preferences and provide high-protein, high-caloric foods as appropriate       INTERVENTIONS:  - Monitor oral intake, urinary output, labs, and treatment plans  - Assess nutrition and hydration status and recommend course of action  - Evaluate amount of meals eaten  - Assist patient with eating if necessary   - Allow adequate time for meals  - Recommend/ encourage appropriate diets, oral nutritional supplements, and vitamin/mineral supplements  - Order, calculate, and assess calorie counts as needed  - Recommend, monitor, and adjust tube feedings and TPN/PPN based on assessed needs  - Assess need for intravenous fluids  - Provide specific nutrition/hydration education as appropriate  - Include patient/family/caregiver in decisions related to nutrition  Outcome: Progressing

## 2020-09-08 NOTE — ASSESSMENT & PLAN NOTE
63-year-old female with history of recurrent UTIs on daily nitrofurantoin, cerebral palsy, bipolar, GERD, hemicolectomy for volvulus, presents from group home with caregiver with 3 days of intermittent fevers, T-max 101 2°, increased loose to watery, nonbloody bowel movements  Patient meets severe sepsis criteria likely secondary to UTI  C diff toxin negative  Cannot rule out pneumonia at this point  · Lactic acid: 4 9 on admission; improved to 1 0 on repeat  · Tachycardia greater than 90 on admission  · BP 89/57, however per caregiver, this is close to baseline  · UA/Urine Micro = bacteria innumerable, nitrite+ leuk+  · Leukocytosis, WBC = 11 6  · CXR:  Unremarkable  · CT:   · Groundglass density left base with the areas of basilar consolidation at the right lung base may be acute or chronic  May represent acute infiltrate or nonresolved previously noted infiltrate    · Thickened urinary bladder wall     Today, patient is clinically improved medically stable for discharge    · Urine culture: >100,000 Klebsiella varicolla   · COVID negative, Cdiff negative  · Blood culture:  No growth at 72 hours  · Ova/ parasite:  Negative    Plan:  · Discharge today to group Home, patient completed 8 days of IV antibiotics  · Continue to hold IV fluid maintenance as patient is hypernatremic at 147 on 09/07/2020  · Patient has history of hypernatremia on IVF  · Outpatient repeat CBC with differential in 1 week, patient follow up with PCP

## 2020-09-08 NOTE — DISCHARGE INSTR - AVS FIRST PAGE
Dear Syed Sneed,     It was our pleasure to care for you here at [facility]  It is our hope that we were always able to exceed the expected standards for your care during your stay  You were hospitalized due to severe sepsis secondary to UTI  You were cared for on the 4th floor by Tierney Gonzalez MD under the service of Hung Waite MD with the [facility] who covers for your primary care physician (PCP), Ysabel Jackson DO, while you were hospitalized  If you have any questions or concerns related to this hospitalization, you may contact us at [phone]  For follow up as well as any medication refills, we recommend that you follow up with your primary care physician  A registered nurse will reach out to you by phone within a few days after your discharge to answer any additional questions that you may have after going home  However, at this time we provide for you here, the most important instructions / recommendations at discharge:     · Notable Medication Adjustments -   · No changes to medications  · Testing Required after Discharge -   · CBC with diff in 1 week  · Please follow up with PCP regarding possible need for heme occult stool  · Important follow up information  · Please follow-up with PCP after 1 week to discuss lab results  · Please follow up with Speech   · Please follow up with Physical Therapy at home  · Other Instructions -   · Please follow up with PCP regarding chest CT in 2 months  · Stool Hemoccult test pending  · Please review this entire after visit summary as additional general instructions including medication list, appointments, activity, diet, any pertinent wound care, and other additional recommendations from your care team that may be provided for you        Sincerely,     Tierney Gonzalez MD

## 2020-09-09 ENCOUNTER — TRANSITIONAL CARE MANAGEMENT (OUTPATIENT)
Dept: FAMILY MEDICINE CLINIC | Facility: CLINIC | Age: 60
End: 2020-09-09

## 2020-09-09 LAB — MISCELLANEOUS LAB TEST RESULT: NORMAL

## 2020-09-11 ENCOUNTER — TELEPHONE (OUTPATIENT)
Dept: FAMILY MEDICINE CLINIC | Facility: CLINIC | Age: 60
End: 2020-09-11

## 2020-09-11 DIAGNOSIS — K59.00 CONSTIPATION, UNSPECIFIED CONSTIPATION TYPE: Primary | ICD-10-CM

## 2020-09-11 RX ORDER — BISACODYL 10 MG
10 SUPPOSITORY, RECTAL RECTAL DAILY
Qty: 12 SUPPOSITORY | Refills: 0 | Status: SHIPPED | OUTPATIENT
Start: 2020-09-11 | End: 2020-09-18 | Stop reason: ALTCHOICE

## 2020-09-11 NOTE — TELEPHONE ENCOUNTER
Lisa calling and stated, " patient needs a PRN order for a suppository as it has been 3 days since she has had bowel movement    I think this would help her "

## 2020-09-11 NOTE — TELEPHONE ENCOUNTER
Laura Salgador returning call to say,  Patient moved 2 large BM's and no longer needed order requested earlier

## 2020-09-15 ENCOUNTER — APPOINTMENT (OUTPATIENT)
Dept: LAB | Facility: MEDICAL CENTER | Age: 60
End: 2020-09-15
Payer: COMMERCIAL

## 2020-09-15 DIAGNOSIS — D50.9 IRON DEFICIENCY ANEMIA, UNSPECIFIED IRON DEFICIENCY ANEMIA TYPE: ICD-10-CM

## 2020-09-15 DIAGNOSIS — F31.9 BIPOLAR AFFECTIVE DISORDER, REMISSION STATUS UNSPECIFIED (HCC): ICD-10-CM

## 2020-09-15 DIAGNOSIS — D72.10 EOSINOPHILIA: ICD-10-CM

## 2020-09-15 LAB
ALBUMIN SERPL BCP-MCNC: 2.7 G/DL (ref 3.5–5)
ALP SERPL-CCNC: 58 U/L (ref 46–116)
ALT SERPL W P-5'-P-CCNC: 12 U/L (ref 12–78)
ANION GAP SERPL CALCULATED.3IONS-SCNC: 4 MMOL/L (ref 4–13)
AST SERPL W P-5'-P-CCNC: 12 U/L (ref 5–45)
BASOPHILS # BLD AUTO: 0.05 THOUSANDS/ΜL (ref 0–0.1)
BASOPHILS NFR BLD AUTO: 0 % (ref 0–1)
BILIRUB SERPL-MCNC: 0.16 MG/DL (ref 0.2–1)
BUN SERPL-MCNC: 6 MG/DL (ref 5–25)
CALCIUM SERPL-MCNC: 9.4 MG/DL (ref 8.3–10.1)
CHLORIDE SERPL-SCNC: 106 MMOL/L (ref 100–108)
CK SERPL-CCNC: 69 U/L (ref 26–192)
CO2 SERPL-SCNC: 34 MMOL/L (ref 21–32)
CREAT SERPL-MCNC: 0.92 MG/DL (ref 0.6–1.3)
EOSINOPHIL # BLD AUTO: 8.93 THOUSAND/ΜL (ref 0–0.61)
EOSINOPHIL NFR BLD AUTO: 57 % (ref 0–6)
ERYTHROCYTE [DISTWIDTH] IN BLOOD BY AUTOMATED COUNT: 12.9 % (ref 11.6–15.1)
GFR SERPL CREATININE-BSD FRML MDRD: 69 ML/MIN/1.73SQ M
GLUCOSE P FAST SERPL-MCNC: 117 MG/DL (ref 65–99)
HCT VFR BLD AUTO: 31 % (ref 34.8–46.1)
HGB BLD-MCNC: 9.6 G/DL (ref 11.5–15.4)
IMM GRANULOCYTES # BLD AUTO: 0.08 THOUSAND/UL (ref 0–0.2)
IMM GRANULOCYTES NFR BLD AUTO: 1 % (ref 0–2)
LYMPHOCYTES # BLD AUTO: 3.21 THOUSANDS/ΜL (ref 0.6–4.47)
LYMPHOCYTES NFR BLD AUTO: 20 % (ref 14–44)
MCH RBC QN AUTO: 35.4 PG (ref 26.8–34.3)
MCHC RBC AUTO-ENTMCNC: 31 G/DL (ref 31.4–37.4)
MCV RBC AUTO: 114 FL (ref 82–98)
MONOCYTES # BLD AUTO: 0.63 THOUSAND/ΜL (ref 0.17–1.22)
MONOCYTES NFR BLD AUTO: 4 % (ref 4–12)
NEUTROPHILS # BLD AUTO: 2.89 THOUSANDS/ΜL (ref 1.85–7.62)
NEUTS SEG NFR BLD AUTO: 18 % (ref 43–75)
NRBC BLD AUTO-RTO: 0 /100 WBCS
PLATELET # BLD AUTO: 292 THOUSANDS/UL (ref 149–390)
PMV BLD AUTO: 11.3 FL (ref 8.9–12.7)
POTASSIUM SERPL-SCNC: 4.3 MMOL/L (ref 3.5–5.3)
PROT SERPL-MCNC: 6.6 G/DL (ref 6.4–8.2)
RBC # BLD AUTO: 2.71 MILLION/UL (ref 3.81–5.12)
SODIUM SERPL-SCNC: 144 MMOL/L (ref 136–145)
WBC # BLD AUTO: 15.79 THOUSAND/UL (ref 4.31–10.16)

## 2020-09-15 PROCEDURE — 85025 COMPLETE CBC W/AUTO DIFF WBC: CPT

## 2020-09-15 PROCEDURE — 80053 COMPREHEN METABOLIC PANEL: CPT

## 2020-09-15 PROCEDURE — 82550 ASSAY OF CK (CPK): CPT

## 2020-09-15 PROCEDURE — 36415 COLL VENOUS BLD VENIPUNCTURE: CPT

## 2020-09-17 ENCOUNTER — TELEPHONE (OUTPATIENT)
Dept: FAMILY MEDICINE CLINIC | Facility: CLINIC | Age: 60
End: 2020-09-17

## 2020-09-18 ENCOUNTER — HOSPITAL ENCOUNTER (OUTPATIENT)
Dept: RADIOLOGY | Facility: HOSPITAL | Age: 60
Discharge: HOME | End: 2020-09-18
Payer: COMMERCIAL

## 2020-09-18 ENCOUNTER — TELEPHONE (OUTPATIENT)
Dept: FAMILY MEDICINE CLINIC | Facility: CLINIC | Age: 60
End: 2020-09-18

## 2020-09-18 ENCOUNTER — APPOINTMENT (OUTPATIENT)
Dept: LAB | Facility: CLINIC | Age: 60
End: 2020-09-18
Payer: COMMERCIAL

## 2020-09-18 ENCOUNTER — OFFICE VISIT (OUTPATIENT)
Dept: FAMILY MEDICINE CLINIC | Facility: CLINIC | Age: 60
End: 2020-09-18

## 2020-09-18 VITALS
SYSTOLIC BLOOD PRESSURE: 110 MMHG | RESPIRATION RATE: 16 BRPM | TEMPERATURE: 96.5 F | HEART RATE: 82 BPM | DIASTOLIC BLOOD PRESSURE: 60 MMHG

## 2020-09-18 DIAGNOSIS — D72.19 EOSINOPHILIC LEUKOCYTOSIS: Primary | ICD-10-CM

## 2020-09-18 DIAGNOSIS — D72.19 EOSINOPHILIC LEUKOCYTOSIS: ICD-10-CM

## 2020-09-18 DIAGNOSIS — E11.00 TYPE 2 DIABETES MELLITUS WITH HYPEROSMOLARITY WITHOUT COMA, WITHOUT LONG-TERM CURRENT USE OF INSULIN (HCC): ICD-10-CM

## 2020-09-18 DIAGNOSIS — R13.12 OROPHARYNGEAL DYSPHAGIA: ICD-10-CM

## 2020-09-18 DIAGNOSIS — E03.8 OTHER SPECIFIED HYPOTHYROIDISM: ICD-10-CM

## 2020-09-18 DIAGNOSIS — E78.5 HYPERLIPIDEMIA, UNSPECIFIED HYPERLIPIDEMIA TYPE: ICD-10-CM

## 2020-09-18 DIAGNOSIS — K21.9 GASTROESOPHAGEAL REFLUX DISEASE WITHOUT ESOPHAGITIS: ICD-10-CM

## 2020-09-18 LAB
BACTERIA UR QL AUTO: ABNORMAL /HPF
BASOPHILS # BLD AUTO: 0.06 THOUSANDS/ΜL (ref 0–0.1)
BASOPHILS NFR BLD AUTO: 0 % (ref 0–1)
BILIRUB UR QL STRIP: NEGATIVE
CLARITY UR: CLEAR
COLOR UR: YELLOW
EOSINOPHIL # BLD AUTO: 6.72 THOUSAND/ΜL (ref 0–0.61)
EOSINOPHIL NFR BLD AUTO: 48 % (ref 0–6)
ERYTHROCYTE [DISTWIDTH] IN BLOOD BY AUTOMATED COUNT: 13.1 % (ref 11.6–15.1)
GLUCOSE UR STRIP-MCNC: ABNORMAL MG/DL
HCT VFR BLD AUTO: 31.2 % (ref 34.8–46.1)
HGB BLD-MCNC: 9.6 G/DL (ref 11.5–15.4)
HGB UR QL STRIP.AUTO: NEGATIVE
IMM GRANULOCYTES # BLD AUTO: 0.08 THOUSAND/UL (ref 0–0.2)
IMM GRANULOCYTES NFR BLD AUTO: 1 % (ref 0–2)
KETONES UR STRIP-MCNC: NEGATIVE MG/DL
LEUKOCYTE ESTERASE UR QL STRIP: NEGATIVE
LYMPHOCYTES # BLD AUTO: 2.76 THOUSANDS/ΜL (ref 0.6–4.47)
LYMPHOCYTES NFR BLD AUTO: 20 % (ref 14–44)
MCH RBC QN AUTO: 35.8 PG (ref 26.8–34.3)
MCHC RBC AUTO-ENTMCNC: 30.8 G/DL (ref 31.4–37.4)
MCV RBC AUTO: 116 FL (ref 82–98)
MONOCYTES # BLD AUTO: 0.6 THOUSAND/ΜL (ref 0.17–1.22)
MONOCYTES NFR BLD AUTO: 4 % (ref 4–12)
NEUTROPHILS # BLD AUTO: 3.88 THOUSANDS/ΜL (ref 1.85–7.62)
NEUTS SEG NFR BLD AUTO: 27 % (ref 43–75)
NITRITE UR QL STRIP: NEGATIVE
NON-SQ EPI CELLS URNS QL MICRO: ABNORMAL /HPF
NRBC BLD AUTO-RTO: 0 /100 WBCS
PH UR STRIP.AUTO: 7.5 [PH]
PLATELET # BLD AUTO: 223 THOUSANDS/UL (ref 149–390)
PMV BLD AUTO: 11.2 FL (ref 8.9–12.7)
PROT UR STRIP-MCNC: NEGATIVE MG/DL
RBC # BLD AUTO: 2.68 MILLION/UL (ref 3.81–5.12)
RBC #/AREA URNS AUTO: ABNORMAL /HPF
SP GR UR STRIP.AUTO: 1.01 (ref 1–1.03)
UROBILINOGEN UR QL STRIP.AUTO: 0.2 E.U./DL
WBC # BLD AUTO: 14.1 THOUSAND/UL (ref 4.31–10.16)
WBC #/AREA URNS AUTO: ABNORMAL /HPF

## 2020-09-18 PROCEDURE — 85025 COMPLETE CBC W/AUTO DIFF WBC: CPT

## 2020-09-18 PROCEDURE — 81001 URINALYSIS AUTO W/SCOPE: CPT | Performed by: FAMILY MEDICINE

## 2020-09-18 PROCEDURE — 71046 X-RAY EXAM CHEST 2 VIEWS: CPT

## 2020-09-18 PROCEDURE — 87086 URINE CULTURE/COLONY COUNT: CPT

## 2020-09-18 PROCEDURE — 36415 COLL VENOUS BLD VENIPUNCTURE: CPT

## 2020-09-18 PROCEDURE — 99495 TRANSJ CARE MGMT MOD F2F 14D: CPT | Performed by: FAMILY MEDICINE

## 2020-09-18 RX ORDER — CORN STARCH
1 LIQUID (ML) ORAL AS NEEDED
Qty: 200 EACH | Refills: 0 | Status: SHIPPED | OUTPATIENT
Start: 2020-09-18 | End: 2021-01-12

## 2020-09-18 RX ORDER — SIMVASTATIN 20 MG
20 TABLET ORAL
Start: 2020-09-18

## 2020-09-18 NOTE — ASSESSMENT & PLAN NOTE
Elevated wbc at 15 79 with elevated eosinophils at 5 43  Uncertain etiology  No localizing exam finding except rales RLL  Will repeat CBC, order UA and Urine culture  Will order stat CXR  Will look out for results and call caregiver with next steps

## 2020-09-18 NOTE — ASSESSMENT & PLAN NOTE
Continue with pureed diet and pudding thick liquid per recs during hospital admission  - GI f/u scheduled for end of the month

## 2020-09-18 NOTE — PROGRESS NOTES
Alpha Cons 1960 female MRN: [id number]    Family Medicine Transition of Care Visit      Assessment/Plan    Problem List Items Addressed This Visit        Digestive    Gastroesophageal reflux disease without esophagitis     Stable  Elevated eosinophils on CBC, ?  Eosinophilic esophagitis  Continue Prilosec 20 mg daily         Dysphagia     Continue with pureed diet and pudding thick liquid per recs during hospital admission  - GI f/u scheduled for end of the month         Relevant Medications    Starch, Thickening, (Resource ThickenUp) PACK       Endocrine    Type 2 diabetes mellitus, without long-term current use of insulin ([facility])       Lab Results   Component Value Date    HGBA1C 5 8 (H) 08/01/2020     -Patient demonstrating good control of T2DM currently  -Continue treatment via Metformin 1000 mg bid           Hypothyroidism     Stable  Tsh normal @ 0 71 on 09/02/2020  Continue Synthroid 25 mcg daily            Other    Hyperlipidemia    Relevant Medications    simvastatin (ZOCOR) 20 mg tablet    Eosinophilic leukocytosis - Primary     Elevated wbc at 15 79 with elevated eosinophils at 5 19  Uncertain etiology  No localizing exam finding except rales RLL  Will repeat CBC, order UA and Urine culture  Will order stat CXR  Will look out for results and call caregiver with next steps         Relevant Orders    CBC and differential    XR chest pa & lateral    Urine culture    Urinalysis with microscopic            - PCP: Marvin Haskins DO  - Follow-up appointments:     Future Appointments   Date Time Provider La Alanis   9/22/2020 10:00 AM AN FL 1 AN [facility]   9/25/2020 10:30 AM Marvin Haskins DO SW FP BE [facility]   9/30/2020  9:00 AM Karo Gordon MD GASTRO [facility]   10/7/2020 10:00 AM Rogelio Orozco MD NEURO [facility]   3/3/2021  1:00 PM BE MAMMO SLN 2 BE SLN Mammo BE [facility]          Please be aware that this note contains text that was dictated and there may be errors pertaining to "sound-alike" words during the dictation process  SUBJECTIVE    CC: ELYSSA    Transitional Care Management Review:  Syed Sneed is a 64 y o  female here for TCM follow up  During the TCM phone call patient stated:    TCM Call (since 8/18/2020)     Date and time call was made  9/9/2020 10:42 AM    Hospital care reviewed  Records reviewed    Patient was hospitialized at  [address] [place] [address]    Date of Admission  09/01/20    Date of discharge  09/08/20    Diagnosis  sever sepsis     Disposition  Home    Current Symptoms  Constipation      TCM Call (since 8/18/2020)     Post hospital issues  None    Should patient be enrolled in anticoag monitoring? No    Scheduled for follow up? Yes    Did you obtain your prescribed medications  Yes    Do you need help managing your prescriptions or medications  No    Is transportation to your appointment needed  No    I have advised the patient to call PCP with any new or worsening symptoms  brian menezes rn     Are you recieving any outpatient services  Yes    What type of services  PT/ Speech         64 y o  female patient with history of cerebral palsy, bipolar, hypothyroidism, dysphagia, DM type 2, GERD, pt is s/p hemicolectomy for volvulus and recurrent UTIs on chronic antibiotics treatment with nitrofurantoin admitted to the [facility] from 09/01/2020 to 09/08/2020 due to severe sepsis secondary to UTI  During today's visit: Luis E Hilliard reports pt is doing much better s/p hospital admission  Dysphagia is much better  Pt is less gagging now ,and does have a cough reflex  They will have to wait a few minutes before proving liquids after meals  Pt has an upcoming appt with GI for further eval on 09/30/2020  Denies pt has had a fever, vomiting, difficulty breathing  Pt did have a few loose stools yesterday so caregiver is holding off on stool softeners at the moment    No need for Dulcolax supp, will d/c off pt's chart  Labs from 09/15/20 s/p hospital admission reviewed:  Concerning for elevated wbc at 15 79, Hgb 9 6, with elevated eosinophils at 8 93 from 1 64 on day of hospital discharge  Pt appears clinically improved per caregiver  They are complying with their medication changes and discharge instructions  They have the following questions:   Increasing Macrodantin dose to bid instead of once daily    Review of Systems  Unable to access pt is non verbal at baseline      Historical Information     The patient history was reviewed as follows:    Past Medical History:   Diagnosis Date    Adjustment disorder     Altered mental status 12/11/2015    Anemia     Bipolar 1 disorder (HCC)     [facility]     Chronic hypernatremia 2/6/2016    Closed fracture of left hip ([facility] ) 1/19/2016    Closed left hip fracture (HCC)     no surgery    Constipation     Dehydration 2/20/2016    Diabetes mellitus ([facility] )     Disease of thyroid gland     Diverticulosis     Fracture of multiple ribs of right side     Hip fracture ([facility] ) 07/26/2015    left    Hyperlipidemia     Hypernatremia 12/28/2018    Hypotension     Impulse control disorder     Incontinence     Intellectual disability due to developmental disorder, unspecified     Microalbuminuria     Osteopathia     Osteoporosis     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[facility] )     Thrombocytopenia ([facility] ) 7/31/2015     Past Surgical History:   Procedure Laterality Date    ABDOMINAL SURGERY      COLECTOMY MIN      re-anastomosis 7/22/16    COLONOSCOPY N/A 7/21/2016    Procedure: COLONOSCOPY;  Surgeon: Carlos Anderson MD;  Location: [facility]; Service:     COLONOSCOPY N/A 1/31/2016    Procedure: COLONOSCOPY;  Surgeon: Carlos Anderson MD;  Location: [facility];  Service:     COLONOSCOPY N/A 7/25/2017    Procedure: COLONOSCOPY;  Surgeon: Carlos Anderson MD;  Location: [facility];   Service: Colorectal    COLOSTOMY      EXPLORATORY LAPAROTOMY W/ BOWEL RESECTION N/A 1/31/2016    Procedure: exploratory laparotomy, left sigmoidectomy, coloproctostomy, take down splenic flexure, loop colostomy;  Surgeon: Kalyani Apple MD;  Location: [facility];  Service:    Vani Lynch OR CLOSE ENTEROSTOMY N/A 7/22/2016    Procedure: SEGMENTAL COLECTOMY WITH COLOCOLOSTOMY;  Surgeon: Kalyani Apple MD;  Location: [facility];  Service: Colorectal     Family History   Problem Relation Age of Onset    Alcohol abuse Mother     Alcohol abuse Father     Diabetes Sister     No Known Problems Sister     No Known Problems Sister       Social History   Social History     Substance and Sexual Activity   Alcohol Use No    Frequency: Never    Binge frequency: Never     Social History     Substance and Sexual Activity   Drug Use No     Social History     Tobacco Use   Smoking Status Never Smoker   Smokeless Tobacco Never Used       Medications:   Meds/Allergies     Current Outpatient Medications:     acetaminophen (TYLENOL) 325 mg tablet, Take 2 tablets (650 mg total) by mouth every 6 (six) hours as needed (pain, fever greater than 100 4), Disp: 240 tablet, Rfl: 0    ARIPiprazole (ABILIFY) 30 mg tablet, Take 1 tablet (30 mg) by mouth daily at 8 pm, Disp: , Rfl: 0    baclofen 10 mg tablet, Take 1 tablet (10 mg total) by mouth 3 (three) times a day, Disp: 90 tablet, Rfl: 6    benzonatate (TESSALON PERLES) 100 mg capsule, Take 1 capsule (100 mg total) by mouth every 8 (eight) hours as needed for cough, Disp: 30 capsule, Rfl: 0    calcium carbonate (OS-MORRIS) 600 MG tablet, Take 1 tablet (600 mg total) by mouth 2 (two) times a day with meals, Disp: 60 tablet, Rfl: 2    carbamide peroxide (DEBROX) 6 5 % otic solution, Administer 2 drops into both ears daily as needed 2 drops in the affected ear prn x 5 days, Disp: , Rfl:     carboxymethylcellulose (REFRESH TEARS) 0 5 % SOLN, 1 drop 3 (three) times a day as needed for dry eyes, Disp: , Rfl:     Cholecalciferol (Vitamin D) 50 MCG (2000 UT) tablet, Take 1 tablet (2,000 Units total) by mouth daily, Disp: 30 tablet, Rfl: 5    Citalopram Hydrobromide (CELEXA PO), Take 40 mg by mouth every morning  , Disp: , Rfl:     docosanol (ABREVA) 10 %, Apply topically 5 (five) times a day Apply and rub in 5x a day until healed as needed for lesion, Disp: , Rfl: 0    ferrous sulfate 324 (65 Fe) mg, Take 325 mg by mouth 2 (two) times a day , Disp: , Rfl:     fexofenadine (ALLEGRA) 180 MG tablet, Take 180 mg by mouth as needed  , Disp: , Rfl:     fluticasone (FLONASE) 50 mcg/act nasal spray, 1 spray into each nostril daily as needed for rhinitis or allergies, Disp: 1 Bottle, Rfl: 1    hydrocortisone 1 % cream, Apply topically to affected area twice daily as needed for rash, Disp: 30 g, Rfl: 0    levothyroxine (SYNTHROID) 25 mcg tablet, Take 1 tablet (25 mcg) by mouth every morning, Disp: , Rfl: 0    LORazepam (ATIVAN) 0 5 mg tablet, Take 0 5 mg by mouth 2 (two) times a day   , Disp: , Rfl:     metFORMIN (GLUCOPHAGE) 1000 MG tablet, Take 1 tablet (1,000 mg total) by mouth 2 (two) times a day with meals, Disp: 60 tablet, Rfl: 2    neomycin-bacitracin-polymyxin b (NEOSPORIN) ointment, Apply 1 application topically 2 (two) times a day as needed, Disp: , Rfl:     nitrofurantoin (MACRODANTIN) 50 mg capsule, Take 1 capsule (50 mg total) by mouth daily at bedtime, Disp: 30 capsule, Rfl: 0    olopatadine HCl (PATADAY) 0 2 % opth drops, Administer 1 drop to both eyes as needed, Disp: , Rfl:     omeprazole (PriLOSEC) 20 mg delayed release capsule, Take 1 capsule (20 mg total) by mouth daily, Disp: 30 capsule, Rfl: 1    Oxybutynin Chloride (DITROPAN XL PO), Take 5 mg by mouth 3 (three) times a day , Disp: , Rfl:     Polyethylene Glycol 3350 (MIRALAX PO), Take 17 g by mouth every morning  , Disp: , Rfl:     simvastatin (ZOCOR) 20 mg tablet, Take 1 tablet (20 mg total) by mouth daily with dinner at 4 pm for hyperlipidemia, Disp: , Rfl:     Starch, Thickening, (Resource ThickenUp) PACK, Take 1 each by mouth as needed (Use with on food and liquid as needed), Disp: 200 each, Rfl: 0    traZODone (DESYREL) 100 mg tablet, Take 1 tablet (100 mg total) by mouth daily at  8 pm for depression, Disp: , Rfl: 0    valproic acid (DEPAKENE) 250 MG/5ML soln, Take 10 mL (500 mg total) by mouth 2 (two) times a day, Disp: 600 mL, Rfl: 1    Vitamins A & D (VITAMIN A & D) ointment, Apply topically as needed for dry skin, Disp: , Rfl:     denosumab (PROLIA) 60 mg/mL, Inject 1 mL (60 mg total) under the skin once for 1 dose, Disp: 1 mL, Rfl: 1  No Known Allergies    OBJECTIVE    Vitals:   Vitals:    09/18/20 0942   BP: 110/60   BP Location: Left arm   Patient Position: Sitting   Cuff Size: Standard   Pulse: 82   Resp: 16   Temp: (!) 96 5 °F (35 8 °C)   TempSrc: Tympanic       There is no height or weight on file to calculate BMI  Physical Exam:    Physical Exam  Vitals signs reviewed  HENT:      Head: Normocephalic and atraumatic  Right Ear: Tympanic membrane and ear canal normal  Impacted cerumen: Minimal compared to left ear canal       Left Ear: There is impacted cerumen  Nose: Nose normal    Cardiovascular:      Rate and Rhythm: Normal rate and regular rhythm  Pulmonary:      Effort: No respiratory distress  Breath sounds: Rales (RLL) present  Abdominal:      General: Bowel sounds are normal    Skin:     General: Skin is warm and dry  Neurological:      Mental Status: She is alert  Comments: Unable to access          Labs: I have personally reviewed all pertinent results       Appointment on 09/15/2020   Component Date Value Ref Range Status    Total CK 09/15/2020 69  26 - 192 U/L Final    Sodium 09/15/2020 144  136 - 145 mmol/L Final    Potassium 09/15/2020 4 3  3 5 - 5 3 mmol/L Final    Chloride 09/15/2020 106  100 - 108 mmol/L Final    CO2 09/15/2020 34* 21 - 32 mmol/L Final    ANION GAP 09/15/2020 4  4 - 13 mmol/L Final    BUN 09/15/2020 6  5 - 25 mg/dL Final    Creatinine 09/15/2020 0 92  0 60 - 1 30 mg/dL Final    Standardized to IDMS reference method    Glucose, Fasting 09/15/2020 117* 65 - 99 mg/dL Final    Specimen collection should occur prior to Sulfasalazine administration due to the potential for falsely depressed results  Specimen collection should occur prior to Sulfapyridine administration due to the potential for falsely elevated results   Calcium 09/15/2020 9 4  8 3 - 10 1 mg/dL Final    AST 09/15/2020 12  5 - 45 U/L Final    Specimen collection should occur prior to Sulfasalazine administration due to the potential for falsely depressed results   ALT 09/15/2020 12  12 - 78 U/L Final    Specimen collection should occur prior to Sulfasalazine and/or Sulfapyridine administration due to the potential for falsely depressed results   Alkaline Phosphatase 09/15/2020 58  46 - 116 U/L Final    Total Protein 09/15/2020 6 6  6 4 - 8 2 g/dL Final    Albumin 09/15/2020 2 7* 3 5 - 5 0 g/dL Final    Total Bilirubin 09/15/2020 0 16* 0 20 - 1 00 mg/dL Final    Use of this assay is not recommended for patients undergoing treatment with eltrombopag due to the potential for falsely elevated results      eGFR 09/15/2020 69  ml/min/1 73sq m Final    WBC 09/15/2020 15 79* 4 31 - 10 16 Thousand/uL Final    RBC 09/15/2020 2 71* 3 81 - 5 12 Million/uL Final    Hemoglobin 09/15/2020 9 6* 11 5 - 15 4 g/dL Final    Hematocrit 09/15/2020 31 0* 34 8 - 46 1 % Final    MCV 09/15/2020 114* 82 - 98 fL Final    MCH 09/15/2020 35 4* 26 8 - 34 3 pg Final    MCHC 09/15/2020 31 0* 31 4 - 37 4 g/dL Final    RDW 09/15/2020 12 9  11 6 - 15 1 % Final    MPV 09/15/2020 11 3  8 9 - 12 7 fL Final    Platelets 53/05/2999 292  149 - 390 Thousands/uL Final    nRBC 09/15/2020 0  /100 WBCs Final    Neutrophils Relative 09/15/2020 18* 43 - 75 % Final    Immat GRANS % 09/15/2020 1  0 - 2 % Final    Lymphocytes Relative 09/15/2020 20  14 - 44 % Final    Monocytes Relative 09/15/2020 4  4 - 12 % Final    Eosinophils Relative 09/15/2020 57* 0 - 6 % Final    Basophils Relative 09/15/2020 0  0 - 1 % Final    Neutrophils Absolute 09/15/2020 2 89  1 85 - 7 62 Thousands/µL Final    Immature Grans Absolute 09/15/2020 0 08  0 00 - 0 20 Thousand/uL Final    Lymphocytes Absolute 09/15/2020 3 21  0 60 - 4 47 Thousands/µL Final    Monocytes Absolute 09/15/2020 0 63  0 17 - 1 22 Thousand/µL Final    Eosinophils Absolute 09/15/2020 8 93* 0 00 - 0 61 Thousand/µL Final    Basophils Absolute 09/15/2020 0 05  0 00 - 0 10 Thousands/µL Final       Imaging:  I have personally reviewed all pertinent results

## 2020-09-18 NOTE — ASSESSMENT & PLAN NOTE
Lab Results   Component Value Date    HGBA1C 5 8 (H) 08/01/2020     -Patient demonstrating good control of T2DM currently  -Continue treatment via Metformin 1000 mg bid

## 2020-09-19 LAB — BACTERIA UR CULT: NORMAL

## 2020-09-22 ENCOUNTER — HOSPITAL ENCOUNTER (OUTPATIENT)
Dept: RADIOLOGY | Facility: HOSPITAL | Age: 60
Discharge: HOME | End: 2020-09-22
Payer: COMMERCIAL

## 2020-09-22 DIAGNOSIS — R13.10 DYSPHAGIA, UNSPECIFIED TYPE: ICD-10-CM

## 2020-09-22 PROCEDURE — 92611 MOTION FLUOROSCOPY/SWALLOW: CPT

## 2020-09-22 PROCEDURE — 74230 X-RAY XM SWLNG FUNCJ C+: CPT

## 2020-09-22 NOTE — PROCEDURES
Video Swallow Study      Patient Name: Oumou FIGUEROA Date: 9/22/2020        Past Medical History  Past Medical History:   Diagnosis Date    Adjustment disorder     Altered mental status 12/11/2015    Anemia     Bipolar 1 disorder ([facility] )     Cerebral palsy ([facility] )     Chronic hypernatremia 2/6/2016    [facility]/2016    Closed left hip fracture (HCC)     no surgery    Constipation     Dehydration 2/20/2016    Diabetes mellitus ([facility] )     Disease of thyroid gland     Diverticulosis     Fracture of multiple ribs of right side     Hip fracture (HCC) 07/26/2015    left    Hyperlipidemia     Hypernatremia 12/28/2018    Hypotension     Impulse control disorder     Incontinence     Intellectual disability due to developmental disorder, unspecified     Microalbuminuria     Osteopathia     Osteoporosis     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HCC)     Thrombocytopenia ([facility] ) 7/31/2015        Past Surgical History  Past Surgical History:   Procedure Laterality Date    ABDOMINAL SURGERY      COLECTOMY MIN      re-anastomosis 7/22/16    COLONOSCOPY N/A 7/21/2016    Procedure: COLONOSCOPY;  Surgeon: Jerrica Hood MD;  [facility]; Service:     COLONOSCOPY N/A 1/31/2016    Procedure: COLONOSCOPY;  Surgeon: Jerrica Hood MD;  [facility];  Service:     COLONOSCOPY N/A 7/25/2017    Procedure: COLONOSCOPY;  Surgeon: Jerrica Hood MD;  [facility]: [facility];   Service: Colorectal    COLOSTOMY      EXPLORATORY LAPAROTOMY W/ BOWEL RESECTION N/A 1/31/2016    Procedure: exploratory laparotomy, left sigmoidectomy, coloproctostomy, take down splenic flexure, loop colostomy;  Surgeon: Jerrica Hood MD;  [facility];  Service:     PA CLOSE ENTEROSTOMY N/A 7/22/2016    Procedure: SEGMENTAL COLECTOMY WITH COLOCOLOSTOMY;  Surgeon: Jerrica Hood MD;  [facility]: [facility];  Service: Colorectal         General Information:    65 yo female referred to [facility]  for a VBS by Dr Shelli Holt  for dysphagia; assess for safety to advance diet  Cognition:  Alert, cooperative     Speech/Swallow [facility]: Oral motor movements appeared grossly WFL ; Dentition was  edentulous; Cough- would not do on command  Respiratory Status: WFL on RA; Current diet: puree w/ pudding thick liquids  Prior VBS 8/4/20: Pt presented with significant oropharyngeal dysphagia characterized primarily by slow, weak tongue "pump" transfer, delayed swallowing initiation, incomplete airway protection and WITH NO RESPONSE TO PENETRATION OR TO PASSIVE ASPIRATION OVER TIME TODAY  Pt was seen in radiology for a Video Barium Swallow Study, seated in the upright position and viewed laterally with the following consistencies: puree and HTL by tsp      Results are as follows:     **Images are available for review on PACS          Oral Stage: Moderately impaired w/ limited consistencies   pt w/ fair bolus retrieval via spoon; fair bolus formation, slow, delayed transfer of puree and HTL  Secondary swallows noted  Pharyngeal Stage: Moderate-severely impaired w/ limited consistencies   Delayed swallow initiation w/ decreased airway entrance closure; Complete epiglottic inversion and hyolaryngeal excursion  No pharyngeal retention noted  Trace penetration noted x1 w/ puree given several trials, and trace amts of penetration of HTL occurred more often w/ eventual passive silent aspiration  Penetration to VC prior to swallow initiation was noted x1 w/ pt's exhibiting neck extension  Esophageal Stage:   briefly assessed; delayed clearance of puree material from mid esophagus noted with retropulsion, ? Dysmotility  Assessment Summary:    Moderate Oral/moderate-severe pharyngeal dysphagia characterized by slow oral processing/transfer of puree and HTL, delayed swallow initiation w/ trace amts of penetration and passive silent aspiration w/ HTL  Diagnosis/Prognosis:            Recommendations:   cont puree diet w/ pudding thick liquids by tsp  Aspiration precautions  meds crushed in puree  Consider small, frequent meals  Pt to remain fully upright at least 1 hour after meals  Consider GI consult- ?  Motility disorder    Sade Trujillo MA CCC-SLP  Speech Patholgist  [place] license # [id number] [facility] [id number]  [place] license # [id number]  Available via [employer]

## 2020-09-24 ENCOUNTER — TELEPHONE (OUTPATIENT)
Dept: FAMILY MEDICINE CLINIC | Facility: CLINIC | Age: 60
End: 2020-09-24

## 2020-09-24 NOTE — PROGRESS NOTES
Assessment/Plan:    Dysphagia  -Recent video barium swallow test showed moderate oral/moderate-severe pharyngeal dysphagia characterized by slow oral processing and transfer of puree   - Recommendation is to continue the patient on pureed diet with pudding thick liquid by TSP with medications crushed and to continue small frequent meals    -Will do repeat VBS in 6 weeks per recommendation of speech therapy  -Advised to follow up at scheduled appointment on the 31IV    Eosinophilic leukocytosis  -Repeat CBC with WBC at 14 1 compared to previous CBC at 15 79   -Relative Eosinophil level slightly decreased from 57 to 48 with absolute eosinophils down to 6 7 from 2 31  -Uncertain etiology  -UA and UCx negative for infection  -CXR was negative for signs of aspiration and showed mild distention of the bowel with elevation of the left hemidiaphragm  -Repeat CBC in one month       Diagnoses and all orders for this visit:    Oropharyngeal dysphagia  -     FL barium swallow video w speech; Future    Encounter for immunization  -     influenza vaccine, quadrivalent, recombinant, PF, 0 5 mL, for patients 18 yr+ (FLUBLOK)    Eosinophilic leukocytosis  -     CBC and differential; Future          Subjective:      Patient ID: Vitaly Bryant is a 64 y o  female  HPI    Patient is a 63 yo female with history of Cerebral Palsy, Type 2DM, Hypothyroidism, s/p Hemicolectomy for volvulus and recurrent UTI's who presents today with her caregiver Debbie Pozo for one week follow up  Patient was last seen in office on 09/18 for TCM following recent admission from 09/01 to 09/08 due to severe sepsis secondary to UTI  During the visit, repeat CBC, UA, and urine culture with CXR were ordered for further evaluation of eosinophilic leukocytosis  CXR from 09/18 was negative for signs of aspiration and showed mild distention of the bowel with elevation of the left hemidiaphragm   CBC showed slightly improved WBC from 15 8 to 14 1 and decrease in eosinophils from 57 to 48  UA and Urine culture results were also negative for signs of infection  Since her last visit, patient was underwent Video Swallow study due to dysphagia  Study showed moderate oral/moderate-severe pharyngeal dysphagia characterized by slow oral processing and transfer of puree  Recommendations is to continue the patient on pureed diet with pudding thick liquid by TSP with medications crushed and to continue small frequent meals  Today [facility] reports that Robbin Durán has looked the more "herself" today compared to the past few weeks  She is more vocal and interactive  She states that yesterday she was very tired and earlier in the week when PT was working with her she was unable to ambulate  In regards to eating, she is eating 100% of her meals but feeding herself only 75% of the time  Her speech therapist also recommend a repeat VBS in 6 weeks  The following portions of the patient's history were reviewed and updated as appropriate:   She  has a past medical history of Adjustment disorder, Altered mental status (12/11/2015), Anemia, Bipolar 1 disorder ([address] ), Cerebral palsy ([address] ), Chronic hypernatremia (2/6/2016), Closed fracture of left hip ([address] ) (1/19/2016), Closed left hip fracture ([address] ), Constipation, Dehydration (2/20/2016), Diabetes mellitus ([address] ), Disease of thyroid gland, Diverticulosis, Fracture of multiple ribs of right side, Hip fracture ([address] ) (07/26/2015), Hyperlipidemia, Hypernatremia (12/28/2018), Hypotension, Impulse control disorder, Incontinence, Intellectual disability due to developmental disorder, unspecified, Microalbuminuria, Osteopathia, Osteoporosis, 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[address] ), and Thrombocytopenia ([address] ) (7/31/2015)    She   Patient Active Problem List    Diagnosis Date Noted    Eosinophilic leukocytosis 27/81/8041    Underweight 09/03/2020    Dysphagia 08/01/2020    Hypokalemia 07/31/2020    Acute cystitis without hematuria 07/26/2020    Decreased appetite 07/26/2020    History of vitamin D deficiency 01/22/2020    History of fall 10/28/2019    Abdominal bloating 09/26/2019    Breast asymmetry 07/09/2019    Hypernatremia 12/28/2018    Severe sepsis ([facility] ) 12/17/2018    Gait disturbance 06/26/2018    Osteoarthritis of both hips 06/08/2018    Severe Intellectual disability 04/19/2018    Type 2 diabetes mellitus, without long-term current use of insulin ([facility] ) 04/19/2018    Abnormal albumin 09/18/2017    Peripheral neuropathy 09/18/2017    Seasonal allergies 06/29/2017    Physical deconditioning 05/09/2017    Hyperlipidemia 03/27/2017    Gastroesophageal reflux disease without esophagitis 03/27/2017    Cerebral palsy ([place] [address] ) 03/27/2017    Spasticity 03/27/2017    Ambulatory dysfunction 03/27/2017    Bipolar disorder ([place] [address] ) 03/27/2017    Osteoporosis 01/19/2016    Resting tremor 01/19/2016    Toxic metabolic encephalopathy 83/68/0378    Gait abnormality 12/11/2015    Other chronic pain 07/30/2015    Anxiety 07/15/2015    Menopause 09/25/2014    Chondrodermatitis nodularis helicis of left ear 30/37/4494    Atopic dermatitis 06/02/2014    Dry eye 06/02/2014    Constipation 04/12/2013    Iron deficiency anemia 03/27/2013    Urinary incontinence 03/27/2013    Hypothyroidism 01/10/2013     She  has a past surgical history that includes Abdominal surgery; pr close enterostomy (N/A, 7/22/2016); Colonoscopy (N/A, 7/21/2016); Exploratory laparotomy w/ bowel resection (N/A, 1/31/2016); Colonoscopy (N/A, 1/31/2016); Colonoscopy (N/A, 7/25/2017); Colostomy; and COLECTOMY MIN  Her family history includes Alcohol abuse in her father and mother; Diabetes in her sister; No Known Problems in her sister and sister  She  reports that she has never smoked  She has never used smokeless tobacco  She reports that she does not drink alcohol or use drugs    Current Outpatient Medications on File Prior to Visit   Medication Sig    acetaminophen (TYLENOL) 325 mg tablet Take 2 tablets (650 mg total) by mouth every 6 (six) hours as needed (pain, fever greater than 100 4)    ARIPiprazole (ABILIFY) 30 mg tablet Take 1 tablet (30 mg) by mouth daily at 8 pm    baclofen 10 mg tablet Take 1 tablet (10 mg total) by mouth 3 (three) times a day    benzonatate (TESSALON PERLES) 100 mg capsule Take 1 capsule (100 mg total) by mouth every 8 (eight) hours as needed for cough    calcium carbonate (OS-MORRIS) 600 MG tablet Take 1 tablet (600 mg total) by mouth 2 (two) times a day with meals    carbamide peroxide (DEBROX) 6 5 % otic solution Administer 2 drops into both ears daily as needed 2 drops in the affected ear prn x 5 days    carboxymethylcellulose (REFRESH TEARS) 0 5 % SOLN 1 drop 3 (three) times a day as needed for dry eyes    Cholecalciferol (Vitamin D) 50 MCG (2000 UT) tablet Take 1 tablet (2,000 Units total) by mouth daily    Citalopram Hydrobromide (CELEXA PO) Take 40 mg by mouth every morning      denosumab (PROLIA) 60 mg/mL Inject 1 mL (60 mg total) under the skin once for 1 dose    docosanol (ABREVA) 10 % Apply topically 5 (five) times a day Apply and rub in 5x a day until healed as needed for lesion    ferrous sulfate 324 (65 Fe) mg Take 325 mg by mouth 2 (two) times a day   fexofenadine (ALLEGRA) 180 MG tablet Take 180 mg by mouth as needed      fluticasone (FLONASE) 50 mcg/act nasal spray 1 spray into each nostril daily as needed for rhinitis or allergies    hydrocortisone 1 % cream Apply topically to affected area twice daily as needed for rash    levothyroxine (SYNTHROID) 25 mcg tablet Take 1 tablet (25 mcg) by mouth every morning    LORazepam (ATIVAN) 0 5 mg tablet Take 0 5 mg by mouth 2 (two) times a day        metFORMIN (GLUCOPHAGE) 1000 MG tablet Take 1 tablet (1,000 mg total) by mouth 2 (two) times a day with meals    neomycin-bacitracin-polymyxin b (NEOSPORIN) ointment Apply 1 application topically 2 (two) times a day as needed    nitrofurantoin (MACRODANTIN) 50 mg capsule Take 1 capsule (50 mg total) by mouth daily at bedtime    olopatadine HCl (PATADAY) 0 2 % opth drops Administer 1 drop to both eyes as needed    omeprazole (PriLOSEC) 20 mg delayed release capsule Take 1 capsule (20 mg total) by mouth daily    Oxybutynin Chloride (DITROPAN XL PO) Take 5 mg by mouth 3 (three) times a day     Polyethylene Glycol 3350 (MIRALAX PO) Take 17 g by mouth every morning      simvastatin (ZOCOR) 20 mg tablet Take 1 tablet (20 mg total) by mouth daily with dinner at 4 pm for hyperlipidemia    Starch, Thickening, (Resource ThickenUp) PACK Take 1 each by mouth as needed (Use with on food and liquid as needed)    traZODone (DESYREL) 100 mg tablet Take 1 tablet (100 mg total) by mouth daily at  8 pm for depression    valproic acid (DEPAKENE) 250 MG/5ML soln Take 10 mL (500 mg total) by mouth 2 (two) times a day    Vitamins A & D (VITAMIN A & D) ointment Apply topically as needed for dry skin     No current facility-administered medications on file prior to visit  She has No Known Allergies       Review of Systems   Constitutional: Positive for activity change, appetite change and fatigue  Negative for chills and fever  HENT: Negative for congestion, postnasal drip, rhinorrhea and trouble swallowing  Respiratory: Negative for cough and choking  Gastrointestinal: Negative for diarrhea and vomiting  Genitourinary: Negative for difficulty urinating and hematuria  Musculoskeletal: Positive for gait problem  Skin: Negative for wound  Psychiatric/Behavioral: Negative for sleep disturbance  Objective:      /70 (BP Location: Left arm, Patient Position: Sitting, Cuff Size: Large)   Pulse 70   Temp (!) 96 °F (35 6 °C) (Tympanic)   Resp 16   Wt 45 3 kg (99 lb 12 8 oz)   LMP 11/29/2009 (Exact Date)   BMI 20 86 kg/m²          Physical Exam  Constitutional:       General: She is not in acute distress       Appearance: She is not ill-appearing or toxic-appearing  HENT:      Head: Normocephalic  Right Ear: Tympanic membrane normal       Left Ear: Tympanic membrane normal       Nose: Nose normal       Mouth/Throat:      Mouth: Mucous membranes are moist    Eyes:      Extraocular Movements: Extraocular movements intact  Pupils: Pupils are equal, round, and reactive to light  Neck:      Musculoskeletal: Neck supple  Cardiovascular:      Rate and Rhythm: Normal rate and regular rhythm  Pulmonary:      Effort: Pulmonary effort is normal  No respiratory distress  Breath sounds: Normal breath sounds  No stridor  No wheezing or rales  Abdominal:      General: Abdomen is flat  Bowel sounds are normal  There is no distension  Tenderness: There is no abdominal tenderness  Musculoskeletal:      Right lower leg: No edema  Left lower leg: No edema  Lymphadenopathy:      Cervical: No cervical adenopathy  Skin:     General: Skin is warm  Neurological:      Mental Status: She is alert        Comments: Cerebral palsy, non distinguishable verbal responses, moves extremities spontaneously

## 2020-09-25 ENCOUNTER — TELEPHONE (OUTPATIENT)
Dept: FAMILY MEDICINE CLINIC | Facility: CLINIC | Age: 60
End: 2020-09-25

## 2020-09-25 ENCOUNTER — OFFICE VISIT (OUTPATIENT)
Dept: FAMILY MEDICINE CLINIC | Facility: CLINIC | Age: 60
End: 2020-09-25

## 2020-09-25 VITALS
SYSTOLIC BLOOD PRESSURE: 100 MMHG | HEART RATE: 70 BPM | DIASTOLIC BLOOD PRESSURE: 70 MMHG | BODY MASS INDEX: 20.86 KG/M2 | WEIGHT: 99.8 LBS | RESPIRATION RATE: 16 BRPM | TEMPERATURE: 96 F

## 2020-09-25 DIAGNOSIS — D72.19 EOSINOPHILIC LEUKOCYTOSIS: ICD-10-CM

## 2020-09-25 DIAGNOSIS — G92.8 TOXIC METABOLIC ENCEPHALOPATHY: ICD-10-CM

## 2020-09-25 DIAGNOSIS — M16.0 OSTEOARTHRITIS OF BOTH HIPS, UNSPECIFIED OSTEOARTHRITIS TYPE: ICD-10-CM

## 2020-09-25 DIAGNOSIS — R26.2 AMBULATORY DYSFUNCTION: ICD-10-CM

## 2020-09-25 DIAGNOSIS — R13.12 OROPHARYNGEAL DYSPHAGIA: Primary | ICD-10-CM

## 2020-09-25 DIAGNOSIS — Z91.81 HISTORY OF FALL: ICD-10-CM

## 2020-09-25 DIAGNOSIS — R53.81 PHYSICAL DECONDITIONING: ICD-10-CM

## 2020-09-25 DIAGNOSIS — Z23 ENCOUNTER FOR IMMUNIZATION: ICD-10-CM

## 2020-09-25 DIAGNOSIS — G80.9 CEREBRAL PALSY, UNSPECIFIED TYPE (HCC): Primary | ICD-10-CM

## 2020-09-25 PROCEDURE — 90682 RIV4 VACC RECOMBINANT DNA IM: CPT | Performed by: FAMILY MEDICINE

## 2020-09-25 PROCEDURE — G0008 ADMIN INFLUENZA VIRUS VAC: HCPCS | Performed by: FAMILY MEDICINE

## 2020-09-25 PROCEDURE — 99213 OFFICE O/P EST LOW 20 MIN: CPT | Performed by: FAMILY MEDICINE

## 2020-09-25 PROCEDURE — 1036F TOBACCO NON-USER: CPT | Performed by: FAMILY MEDICINE

## 2020-09-25 NOTE — ASSESSMENT & PLAN NOTE
-Recent video barium swallow test showed moderate oral/moderate-severe pharyngeal dysphagia characterized by slow oral processing and transfer of puree   - Recommendation is to continue the patient on pureed diet with pudding thick liquid by TSP with medications crushed and to continue small frequent meals    -Will do repeat VBS in 6 weeks per recommendation of speech therapy  -Advised to follow up at scheduled appointment on the 30th

## 2020-09-25 NOTE — ASSESSMENT & PLAN NOTE
-Repeat CBC with WBC at 14 1 compared to previous CBC at 15 79   -Relative Eosinophil level slightly decreased from 57 to 48 with absolute eosinophils down to 6 7 from 9 01  -Uncertain etiology  -UA and UCx negative for infection  -CXR was negative for signs of aspiration and showed mild distention of the bowel with elevation of the left hemidiaphragm  -Repeat CBC in one month

## 2020-09-25 NOTE — TELEPHONE ENCOUNTER
Lisa requesting a letter of medical necessity sent to [facility] for a Hospital bed that was put on hold when patient was hospitalized

## 2020-09-30 ENCOUNTER — OFFICE VISIT (OUTPATIENT)
Dept: GASTROENTEROLOGY | Facility: AMBULARY SURGERY CENTER | Age: 60
End: 2020-09-30
Payer: COMMERCIAL

## 2020-09-30 VITALS
SYSTOLIC BLOOD PRESSURE: 110 MMHG | HEART RATE: 80 BPM | TEMPERATURE: 97.6 F | BODY MASS INDEX: 21.32 KG/M2 | DIASTOLIC BLOOD PRESSURE: 60 MMHG | WEIGHT: 101.6 LBS | RESPIRATION RATE: 18 BRPM | HEIGHT: 58 IN

## 2020-09-30 DIAGNOSIS — K59.00 CONSTIPATION, UNSPECIFIED CONSTIPATION TYPE: ICD-10-CM

## 2020-09-30 DIAGNOSIS — R13.10 DYSPHAGIA, UNSPECIFIED TYPE: Primary | ICD-10-CM

## 2020-09-30 DIAGNOSIS — K21.9 GASTROESOPHAGEAL REFLUX DISEASE, ESOPHAGITIS PRESENCE NOT SPECIFIED: ICD-10-CM

## 2020-09-30 PROCEDURE — 99214 OFFICE O/P EST MOD 30 MIN: CPT | Performed by: INTERNAL MEDICINE

## 2020-09-30 RX ORDER — OMEPRAZOLE 20 MG/1
20 CAPSULE, DELAYED RELEASE ORAL
Qty: 60 CAPSULE | Refills: 2 | Status: SHIPPED | OUTPATIENT
Start: 2020-09-30 | End: 2020-11-03 | Stop reason: SDUPTHER

## 2020-09-30 NOTE — LETTER
September 30, 2020     Referral [address] [facility] [address]    Patient: Clarence Wesley   YOB: 1960   Date of Visit: 9/30/2020       Dear Dr Munira Chavez:    Thank you for referring Peewee Paulino to me for evaluation  Below are my notes for this consultation  If you have questions, please do not hesitate to call me  I look forward to following your patient along with you  Sincerely,        Aishwarya Sena MD        CC: Kalpana Lea, Dandy Grayson MD  9/30/2020 12:43 PM  Sign when Signing Visit  Consultation - [facility]  Clarence Wesley 64 y o  female MRN: [id number]  Unit/Bed#:  Encounter: [id number]        Consults    ASSESSMENT/PLAN:     1  Dysphagia-likely combination of or pharyngeal dysphagia with possible peptic stricture as patient symptoms have improved somewhat with introduction of omeprazole 20 mg on daily basis  Would recommend increasing omeprazole to 20 mg b i d     Would also recommend the patient follow strict aspiration precautions, stay upright for at least 45 minutes after meals  Diet as per speech   -at this time after lengthy discussion with nurse, and patient's ongoing symptoms, will plan for EGD to assess for peptic stricture/Schatzki's ring versus less likely malignancy  Depending on the findings, she may benefit for dilation  As per nurse, patient has a POA who makes decisions and nurse states that she will speak with the POA in regards to the procedure   -given eosinophilia on labs, will also check for eosinophilic esophagitis  2  Colon cancer screening-patient is due for colonoscopy at this time however due to ongoing issues with dysphagia, will hold off on procedure at this time  3  Constipation- Her bowel movements are back to baseline, would recommend that she decrease the MiraLax to 2 tsp on daily basis given she is having loose bowel movements  After discussion with nurse, we will place an order for p r n  Dose of 1 tbsp MiraLax as well should patient not have a bowel movement for 1 or 2 days  3  Macrocytic anemia-no signs of overt bleeding including hematochezia, melena-fecal occult blood test was negative in the hospital     ______________________________________________________________________    Reason for Consult / Principal Problem: [unfilled]    HPI: Joselyn Whitlock is a 64y o  year old female with cerebral palsy, hypothyroidism, type 2 diabetes, who presents from nursing facility with the caregiver for evaluation of dysphagia symptoms  As per nurse who accompanies the patient, patient has had symptoms of dysphagia dating back to July of this year  Nurse reports that symptoms began after course of antibiotics  She reports that patient started to experience symptoms of dysphagia with difficulty swallowing in the oropharyngeal region  She also reports that she was previously on PPI which was stopped earlier this year  She was recently placed back on PPI, currently on omeprazole 20 mg a with some symptomatic improvement  She has had multiple evaluations by speech pathology, most recently on 09/22, and which time she underwent a video barium swallow, she was noted to have moderate oropharyngeal dysphagia, she was recommended pureed diet with pudding thick liquids  Esophageal stage was briefly assessed and yielded delayed clearance of  Material from the midesophagus raising the possibility of dysmotility  Furthermore, patient also has history of bowel resection, appears that she underwent bowel resection over 3-4 years ago for bowel villous  She underwent colonoscopy in 2017 by Dr Barnhart which was a fair prep and was recommended a repeat at 3 year interval   There were no colon polyps or lesions on previous colonoscopy  CT scan on previous hospitalization was notable for 2 2 cm calcific density within the right side of the transverse colon concerning for enterolith    As per nurse, patient was having symptoms of diarrhea during the hospitalization followed by constipation, now she reports that patient is having loose bowel movement on daily basis  Patient does not report any abdominal pain, no reports of hematochezia  No reports of melena  Her most recent labs did show hemoglobin of 9 6, and eosinophilia  Review of Systems: The remainder of the review of systems was negative except for the pertinent positives noted in HPI  Historical Information   Past Medical History:   Diagnosis Date    Adjustment disorder     Altered mental status 12/11/2015    Anemia     Bipolar 1 disorder (HCC)     [facility] )     Chronic hypernatremia 2/6/2016    Closed fracture of left hip ([facility] ) 1/19/2016    Closed left hip fracture (HCC)     no surgery    Constipation     Dehydration 2/20/2016    Diabetes mellitus ([place] [address] )     Disease of thyroid gland     Diverticulosis     Fracture of multiple ribs of right side     Hip fracture ([place] [address] ) 07/26/2015    left    Hyperlipidemia     Hypernatremia 12/28/2018    Hypotension     Impulse control disorder     Incontinence     Intellectual disability due to developmental disorder, unspecified     Microalbuminuria     Osteopathia     Osteoporosis     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[place] [address] )     Thrombocytopenia ([place] [address] ) 7/31/2015     Past Surgical History:   Procedure Laterality Date    ABDOMINAL SURGERY      COLECTOMY MIN      re-anastomosis 7/22/16    COLONOSCOPY N/A 7/21/2016    Procedure: COLONOSCOPY;  Surgeon: Jada Pandya MD;  [facility]; Service:     COLONOSCOPY N/A 1/31/2016    Procedure: COLONOSCOPY;  Surgeon: Jada Pandya MD;  [facility];  Service:     COLONOSCOPY N/A 7/25/2017    Procedure: COLONOSCOPY;  Surgeon: Jada Pandya MD;  [facility];   Service: Colorectal    COLOSTOMY      EXPLORATORY LAPAROTOMY W/ BOWEL RESECTION N/A 1/31/2016    Procedure: exploratory laparotomy, left sigmoidectomy, coloproctostomy, take down splenic flexure, loop colostomy;  Surgeon: Maritza Pham MD;  [facility];  Service:     OR CLOSE ENTEROSTOMY N/A 7/22/2016    Procedure: SEGMENTAL COLECTOMY WITH COLOCOLOSTOMY;  Surgeon: Maritza Pham MD;  [facility];  Service: Colorectal     Social History   Social History     Substance and Sexual Activity   Alcohol Use No    Frequency: Never    Binge frequency: Never     Social History     Substance and Sexual Activity   Drug Use No     Social History     Tobacco Use   Smoking Status Never Smoker   Smokeless Tobacco Never Used     Family History   Problem Relation Age of Onset    Alcohol abuse Mother     Alcohol abuse Father     Diabetes Sister     No Known Problems Sister     No Known Problems Sister        Meds/Allergies     (Not in a hospital admission)    No current facility-administered medications for this visit  No Known Allergies    Objective     Blood pressure 110/60, pulse 80, temperature 97 6 °F (36 4 °C), temperature source Temporal, resp  rate 18, height 4' 10" (1 473 m), weight 46 1 kg (101 lb 9 6 oz), last menstrual period 11/29/2009, not currently breastfeeding  [unfilled]    PHYSICAL EXAM     GEN: well nourished, well developed, no acute distress  HEENT: anicteric, MMM,   CV: RRR, no m/r/g  CHEST: CTA b/l, no WRR  ABD: +BS, soft, NT/ND, midline scar noted on exam, no tenderness or rigidity  EXT: no c/c/e  SKIN: no rashes,  NEURO: aaox1-2    Lab Results:   No visits with results within 1 Day(s) from this visit     Latest known visit with results is:   Appointment on 09/18/2020   Component Date Value    WBC 09/18/2020 14 10*    RBC 09/18/2020 2 68*    Hemoglobin 09/18/2020 9 6*    Hematocrit 09/18/2020 31 2*    MCV 09/18/2020 116*    MCH 09/18/2020 35 8*    MCHC 09/18/2020 30 8*    RDW 09/18/2020 13 1     MPV 09/18/2020 11 2     Platelets 55/16/9425 223     nRBC 09/18/2020 0     Neutrophils Relative 09/18/2020 27*  Immat GRANS % 09/18/2020 1     Lymphocytes Relative 09/18/2020 20     Monocytes Relative 09/18/2020 4     Eosinophils Relative 09/18/2020 48*    Basophils Relative 09/18/2020 0     Neutrophils Absolute 09/18/2020 3 88     Immature Grans Absolute 09/18/2020 0 08     Lymphocytes Absolute 09/18/2020 2 76     Monocytes Absolute 09/18/2020 0 60     Eosinophils Absolute 09/18/2020 6 72*    Basophils Absolute 09/18/2020 0 06     Urine Culture 09/18/2020 No Growth <1000 cfu/mL      Imaging Studies: I have personally reviewed pertinent films in PACS                Answers for HPI/ROS submitted by the patient on 9/29/2020   Abdominal pain  Onset quality: undetermined  Frequency: rarely  belching: Yes  constipation: Yes  diarrhea: Yes  dysuria: Yes  flatus: Yes  Diagnostic workup: CT scan

## 2020-09-30 NOTE — PROGRESS NOTES
Consultation - 126 [facility]  Damion Avendano 64 y o  female MRN: [id number]  Unit/Bed#:  Encounter: [id number]        Consults    ASSESSMENT/PLAN:     1  Dysphagia-likely combination of or pharyngeal dysphagia with possible peptic stricture as patient symptoms have improved somewhat with introduction of omeprazole 20 mg on daily basis  Would recommend increasing omeprazole to 20 mg b i d     Would also recommend the patient follow strict aspiration precautions, stay upright for at least 45 minutes after meals  Diet as per speech   -at this time after lengthy discussion with nurse, and patient's ongoing symptoms, will plan for EGD to assess for peptic stricture/Schatzki's ring versus less likely malignancy  Depending on the findings, she may benefit for dilation  As per nurse, patient has a POA who makes decisions and nurse states that she will speak with the POA in regards to the procedure   -given eosinophilia on labs, will also check for eosinophilic esophagitis  2  Colon cancer screening-patient is due for colonoscopy at this time however due to ongoing issues with dysphagia, will hold off on procedure at this time  3  Constipation- Her bowel movements are back to baseline, would recommend that she decrease the MiraLax to 2 tsp on daily basis given she is having loose bowel movements  After discussion with nurse, we will place an order for p r n  Dose of 1 tbsp MiraLax as well should patient not have a bowel movement for 1 or 2 days      3  Macrocytic anemia-no signs of overt bleeding including hematochezia, melena-fecal occult blood test was negative in the hospital     ______________________________________________________________________    Reason for Consult / Principal Problem: [unfilled]    HPI: Damion Avendano is a 64y o  year old female with cerebral palsy, hypothyroidism, type 2 diabetes, who presents from nursing facility with the caregiver for evaluation of dysphagia symptoms  As per nurse who accompanies the patient, patient has had symptoms of dysphagia dating back to July of this year  Nurse reports that symptoms began after course of antibiotics  She reports that patient started to experience symptoms of dysphagia with difficulty swallowing in the oropharyngeal region  She also reports that she was previously on PPI which was stopped earlier this year  She was recently placed back on PPI, currently on omeprazole 20 mg a with some symptomatic improvement  She has had multiple evaluations by speech pathology, most recently on 09/22, and which time she underwent a video barium swallow, she was noted to have moderate oropharyngeal dysphagia, she was recommended pureed diet with pudding thick liquids  Esophageal stage was briefly assessed and yielded delayed clearance of  Material from the midesophagus raising the possibility of dysmotility  Furthermore, patient also has history of bowel resection, appears that she underwent bowel resection over 3-4 years ago for bowel villous  She underwent colonoscopy in 2017 by Dr Barnhart which was a fair prep and was recommended a repeat at 3 year interval   There were no colon polyps or lesions on previous colonoscopy  CT scan on previous hospitalization was notable for 2 2 cm calcific density within the right side of the transverse colon concerning for enterolith  As per nurse, patient was having symptoms of diarrhea during the hospitalization followed by constipation, now she reports that patient is having loose bowel movement on daily basis  Patient does not report any abdominal pain, no reports of hematochezia  No reports of melena  Her most recent labs did show hemoglobin of 9 6, and eosinophilia  Review of Systems: The remainder of the review of systems was negative except for the pertinent positives noted in HPI       Historical Information   Past Medical History:   Diagnosis Date    Adjustment disorder     Altered mental status 12/11/2015    Anemia     Bipolar 1 disorder (HCC)     Cerebral palsy (HCC)     Chronic hypernatremia 2/6/2016    Closed fracture [facility] left hip ([facility] ) 1/19/2016    Closed left hip fracture (HCC)     no surgery    Constipation     Dehydration 2/20/2016    Diabetes mellitus ([place] [address] )     Disease of thyroid gland     Diverticulosis     Fracture of multiple ribs of right side     Hip fracture ([facility] ) 07/26/2015    left    Hyperlipidemia     Hypernatremia 12/28/2018    Hypotension     Impulse control disorder     Incontinence     Intellectual disability due to developmental disorder, unspecified     Microalbuminuria     Osteopathia     Osteoporosis     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[facility] )     Thrombocytopenia ([place] [address] ) 7/31/2015     Past Surgical History:   Procedure Laterality Date    ABDOMINAL SURGERY      COLECTOMY MIN      re-anastomosis 7/22/16    COLONOSCOPY N/A 7/21/2016    Procedure: COLONOSCOPY;  Surgeon: Jerrica Hood MD;  [facility]; Service:     COLONOSCOPY N/A 1/31/2016    Procedure: COLONOSCOPY;  Surgeon: Jerrica Hood MD;  [facility];  Service:     COLONOSCOPY N/A 7/25/2017    Procedure: COLONOSCOPY;  Surgeon: Jerrica Hood MD;  [facility];   Service: Colorectal    COLOSTOMY      EXPLORATORY LAPAROTOMY W/ BOWEL RESECTION N/A 1/31/2016    Procedure: exploratory laparotomy, left sigmoidectomy, coloproctostomy, take down splenic flexure, loop colostomy;  Surgeon: Jerrica Hood MD;  [facility];  Service:     [place] CLOSE ENTEROSTOMY N/A 7/22/2016    Procedure: SEGMENTAL COLECTOMY WITH COLOCOLOSTOMY;  Surgeon: Jerrica Hood MD;  [facility];  Service: Colorectal     Social History   Social History     Substance and Sexual Activity   Alcohol Use No    Frequency: Never    Binge frequency: Never     Social History     Substance and Sexual Activity   Drug Use No     Social History     Tobacco Use   Smoking Status Never Smoker   Smokeless Tobacco Never Used     Family History   Problem Relation Age of Onset    Alcohol abuse Mother     Alcohol abuse Father     Diabetes Sister     No Known Problems Sister     No Known Problems Sister        Meds/Allergies     (Not in a hospital admission)    No current facility-administered medications for this visit  No Known Allergies    Objective     Blood pressure 110/60, pulse 80, temperature 97 6 °F (36 4 °C), temperature source Temporal, resp  rate 18, height 4' 10" (1 473 m), weight 46 1 kg (101 lb 9 6 oz), last menstrual period 11/29/2009, not currently breastfeeding  [unfilled]    PHYSICAL EXAM     GEN: well nourished, well developed, no acute distress  HEENT: anicteric, MMM,   CV: RRR, no m/r/g  CHEST: CTA b/l, no WRR  ABD: +BS, soft, NT/ND, midline scar noted on exam, no tenderness or rigidity  EXT: no c/c/e  SKIN: no rashes,  NEURO: aaox1-2    Lab Results:   No visits with results within 1 Day(s) from this visit     Latest known visit with results is:   Appointment on 09/18/2020   Component Date Value    WBC 09/18/2020 14 10*    RBC 09/18/2020 2 68*    Hemoglobin 09/18/2020 9 6*    Hematocrit 09/18/2020 31 2*    MCV 09/18/2020 116*    MCH 09/18/2020 35 8*    MCHC 09/18/2020 30 8*    RDW 09/18/2020 13 1     MPV 09/18/2020 11 2     Platelets 40/91/0078 223     nRBC 09/18/2020 0     Neutrophils Relative 09/18/2020 27*    Immat GRANS % 09/18/2020 1     Lymphocytes Relative 09/18/2020 20     Monocytes Relative 09/18/2020 4     Eosinophils Relative 09/18/2020 48*    Basophils Relative 09/18/2020 0     Neutrophils Absolute 09/18/2020 3 88     Immature Grans Absolute 09/18/2020 0 08     Lymphocytes Absolute 09/18/2020 2 76     Monocytes Absolute 09/18/2020 0 60     Eosinophils Absolute 09/18/2020 6 72*    Basophils Absolute 09/18/2020 0 06     Urine Culture 09/18/2020 No Growth <1000 cfu/mL      Imaging Studies: I have personally reviewed pertinent films in PACS                Answers for HPI/ROS submitted by the patient on 9/29/2020   Abdominal pain  Onset quality: undetermined  Frequency: rarely  belching: Yes  constipation: Yes  diarrhea: Yes  dysuria: Yes  flatus: Yes  Diagnostic workup: CT scan

## 2020-10-01 NOTE — TELEPHONE ENCOUNTER
Lisa calling about this again, she states order and office note are not sufficient with medicaid and that [facility] still needs a letter of medical necessity additional to order and note   She would like a call when complete [phone]

## 2020-10-07 ENCOUNTER — OFFICE VISIT (OUTPATIENT)
Dept: NEUROLOGY | Facility: CLINIC | Age: 60
End: 2020-10-07
Payer: COMMERCIAL

## 2020-10-07 VITALS
HEART RATE: 86 BPM | TEMPERATURE: 97.5 F | DIASTOLIC BLOOD PRESSURE: 70 MMHG | BODY MASS INDEX: 21.41 KG/M2 | SYSTOLIC BLOOD PRESSURE: 108 MMHG | WEIGHT: 102 LBS | HEIGHT: 58 IN

## 2020-10-07 DIAGNOSIS — R25.2 SPASTICITY AS LATE EFFECT OF CEREBROVASCULAR ACCIDENT (CVA): Primary | ICD-10-CM

## 2020-10-07 DIAGNOSIS — G80.9 CEREBRAL PALSY, UNSPECIFIED TYPE (HCC): ICD-10-CM

## 2020-10-07 DIAGNOSIS — F79 INTELLECTUAL DISABILITY: ICD-10-CM

## 2020-10-07 DIAGNOSIS — R53.81 PHYSICAL DECONDITIONING: ICD-10-CM

## 2020-10-07 DIAGNOSIS — K21.00 GASTROESOPHAGEAL REFLUX DISEASE WITH ESOPHAGITIS WITHOUT HEMORRHAGE: ICD-10-CM

## 2020-10-07 DIAGNOSIS — G92.8 TOXIC METABOLIC ENCEPHALOPATHY: ICD-10-CM

## 2020-10-07 DIAGNOSIS — I69.398 SPASTICITY AS LATE EFFECT OF CEREBROVASCULAR ACCIDENT (CVA): Primary | ICD-10-CM

## 2020-10-07 DIAGNOSIS — R26.2 AMBULATORY DYSFUNCTION: ICD-10-CM

## 2020-10-07 DIAGNOSIS — R25.2 SPASTICITY: ICD-10-CM

## 2020-10-07 DIAGNOSIS — D72.19 EOSINOPHILIC LEUKOCYTOSIS, UNSPECIFIED TYPE: ICD-10-CM

## 2020-10-07 PROCEDURE — 99214 OFFICE O/P EST MOD 30 MIN: CPT | Performed by: PSYCHIATRY & NEUROLOGY

## 2020-10-07 PROCEDURE — 1036F TOBACCO NON-USER: CPT | Performed by: PSYCHIATRY & NEUROLOGY

## 2020-10-07 PROCEDURE — 3008F BODY MASS INDEX DOCD: CPT | Performed by: FAMILY MEDICINE

## 2020-10-07 RX ORDER — BACLOFEN 10 MG/1
10 TABLET ORAL 3 TIMES DAILY
Qty: 90 TABLET | Refills: 11 | Status: SHIPPED | OUTPATIENT
Start: 2020-10-07 | End: 2021-10-19 | Stop reason: SDUPTHER

## 2020-10-16 ENCOUNTER — TELEPHONE (OUTPATIENT)
Dept: FAMILY MEDICINE CLINIC | Facility: CLINIC | Age: 60
End: 2020-10-16

## 2020-10-21 ENCOUNTER — LAB (OUTPATIENT)
Dept: LAB | Facility: MEDICAL CENTER | Age: 60
End: 2020-10-21
Payer: COMMERCIAL

## 2020-10-21 ENCOUNTER — TRANSCRIBE ORDERS (OUTPATIENT)
Dept: ADMINISTRATIVE | Facility: HOSPITAL | Age: 60
End: 2020-10-21

## 2020-10-21 DIAGNOSIS — Z79.899 ENCOUNTER FOR LONG-TERM (CURRENT) USE OF OTHER MEDICATIONS: Primary | ICD-10-CM

## 2020-10-21 DIAGNOSIS — E88.89 MADELUNG'S NECK (HCC): ICD-10-CM

## 2020-10-21 DIAGNOSIS — D72.19 EOSINOPHILIC LEUKOCYTOSIS: ICD-10-CM

## 2020-10-21 DIAGNOSIS — Z79.899 ENCOUNTER FOR LONG-TERM (CURRENT) USE OF OTHER MEDICATIONS: ICD-10-CM

## 2020-10-21 LAB
ALBUMIN SERPL BCP-MCNC: 3.1 G/DL (ref 3.5–5)
ALBUMIN SERPL BCP-MCNC: 3.1 G/DL (ref 3.5–5)
ALP SERPL-CCNC: 45 U/L (ref 46–116)
ALP SERPL-CCNC: 45 U/L (ref 46–116)
ALT SERPL W P-5'-P-CCNC: 11 U/L (ref 12–78)
ALT SERPL W P-5'-P-CCNC: 13 U/L (ref 12–78)
AMYLASE SERPL-CCNC: 55 IU/L (ref 25–115)
ANION GAP SERPL CALCULATED.3IONS-SCNC: 2 MMOL/L (ref 4–13)
AST SERPL W P-5'-P-CCNC: 10 U/L (ref 5–45)
AST SERPL W P-5'-P-CCNC: 14 U/L (ref 5–45)
BASOPHILS # BLD AUTO: 0.04 THOUSANDS/ΜL (ref 0–0.1)
BASOPHILS NFR BLD AUTO: 1 % (ref 0–1)
BILIRUB DIRECT SERPL-MCNC: 0.08 MG/DL (ref 0–0.2)
BILIRUB DIRECT SERPL-MCNC: <0.05 MG/DL (ref 0–0.2)
BILIRUB SERPL-MCNC: 0.19 MG/DL (ref 0.2–1)
BILIRUB SERPL-MCNC: 0.21 MG/DL (ref 0.2–1)
BUN SERPL-MCNC: 19 MG/DL (ref 5–25)
CALCIUM ALBUM COR SERPL-MCNC: 10.1 MG/DL (ref 8.3–10.1)
CALCIUM SERPL-MCNC: 9.4 MG/DL (ref 8.3–10.1)
CHLORIDE SERPL-SCNC: 102 MMOL/L (ref 100–108)
CO2 SERPL-SCNC: 37 MMOL/L (ref 21–32)
CREAT SERPL-MCNC: 0.9 MG/DL (ref 0.6–1.3)
EOSINOPHIL # BLD AUTO: 1.08 THOUSAND/ΜL (ref 0–0.61)
EOSINOPHIL NFR BLD AUTO: 14 % (ref 0–6)
ERYTHROCYTE [DISTWIDTH] IN BLOOD BY AUTOMATED COUNT: 11.8 % (ref 11.6–15.1)
EST. AVERAGE GLUCOSE BLD GHB EST-MCNC: 131 MG/DL
GFR SERPL CREATININE-BSD FRML MDRD: 71 ML/MIN/1.73SQ M
GLUCOSE P FAST SERPL-MCNC: 127 MG/DL (ref 65–99)
HBA1C MFR BLD: 6.2 %
HCT VFR BLD AUTO: 35.2 % (ref 34.8–46.1)
HGB BLD-MCNC: 10.9 G/DL (ref 11.5–15.4)
IMM GRANULOCYTES # BLD AUTO: 0.03 THOUSAND/UL (ref 0–0.2)
IMM GRANULOCYTES NFR BLD AUTO: 0 % (ref 0–2)
LIPASE SERPL-CCNC: 206 U/L (ref 73–393)
LYMPHOCYTES # BLD AUTO: 2.09 THOUSANDS/ΜL (ref 0.6–4.47)
LYMPHOCYTES NFR BLD AUTO: 27 % (ref 14–44)
MCH RBC QN AUTO: 34.3 PG (ref 26.8–34.3)
MCHC RBC AUTO-ENTMCNC: 31 G/DL (ref 31.4–37.4)
MCV RBC AUTO: 111 FL (ref 82–98)
MONOCYTES # BLD AUTO: 0.69 THOUSAND/ΜL (ref 0.17–1.22)
MONOCYTES NFR BLD AUTO: 9 % (ref 4–12)
NEUTROPHILS # BLD AUTO: 3.96 THOUSANDS/ΜL (ref 1.85–7.62)
NEUTS SEG NFR BLD AUTO: 49 % (ref 43–75)
NRBC BLD AUTO-RTO: 0 /100 WBCS
PLATELET # BLD AUTO: 181 THOUSANDS/UL (ref 149–390)
PMV BLD AUTO: 12.7 FL (ref 8.9–12.7)
POTASSIUM SERPL-SCNC: 3.9 MMOL/L (ref 3.5–5.3)
PROLACTIN SERPL-MCNC: 8.7 NG/ML
PROT SERPL-MCNC: 7.1 G/DL (ref 6.4–8.2)
PROT SERPL-MCNC: 7.2 G/DL (ref 6.4–8.2)
RBC # BLD AUTO: 3.18 MILLION/UL (ref 3.81–5.12)
SODIUM SERPL-SCNC: 141 MMOL/L (ref 136–145)
VALPROATE SERPL-MCNC: 44 UG/ML (ref 50–100)
WBC # BLD AUTO: 7.89 THOUSAND/UL (ref 4.31–10.16)

## 2020-10-21 PROCEDURE — 80076 HEPATIC FUNCTION PANEL: CPT

## 2020-10-21 PROCEDURE — 3044F HG A1C LEVEL LT 7.0%: CPT | Performed by: FAMILY MEDICINE

## 2020-10-21 PROCEDURE — 83690 ASSAY OF LIPASE: CPT

## 2020-10-21 PROCEDURE — 82150 ASSAY OF AMYLASE: CPT

## 2020-10-21 PROCEDURE — 84146 ASSAY OF PROLACTIN: CPT

## 2020-10-21 PROCEDURE — 82248 BILIRUBIN DIRECT: CPT

## 2020-10-21 PROCEDURE — 85025 COMPLETE CBC W/AUTO DIFF WBC: CPT

## 2020-10-21 PROCEDURE — 80053 COMPREHEN METABOLIC PANEL: CPT

## 2020-10-21 PROCEDURE — 36415 COLL VENOUS BLD VENIPUNCTURE: CPT

## 2020-10-21 PROCEDURE — 3044F HG A1C LEVEL LT 7.0%: CPT | Performed by: PSYCHIATRY & NEUROLOGY

## 2020-10-21 PROCEDURE — 83036 HEMOGLOBIN GLYCOSYLATED A1C: CPT

## 2020-10-21 PROCEDURE — 80164 ASSAY DIPROPYLACETIC ACD TOT: CPT

## 2020-10-26 ENCOUNTER — HOSPITAL ENCOUNTER (OUTPATIENT)
Dept: GASTROENTEROLOGY | Facility: HOSPITAL | Age: 60
Setting detail: OUTPATIENT SURGERY
Discharge: HOME | End: 2020-10-26
Attending: INTERNAL MEDICINE | Admitting: INTERNAL MEDICINE
Payer: COMMERCIAL

## 2020-10-26 ENCOUNTER — ANESTHESIA (OUTPATIENT)
Dept: GASTROENTEROLOGY | Facility: HOSPITAL | Age: 60
End: 2020-10-26

## 2020-10-26 ENCOUNTER — ANESTHESIA EVENT (OUTPATIENT)
Dept: GASTROENTEROLOGY | Facility: HOSPITAL | Age: 60
End: 2020-10-26

## 2020-10-26 VITALS
TEMPERATURE: 97.4 F | RESPIRATION RATE: 16 BRPM | OXYGEN SATURATION: 99 % | DIASTOLIC BLOOD PRESSURE: 60 MMHG | WEIGHT: 102 LBS | SYSTOLIC BLOOD PRESSURE: 92 MMHG | HEART RATE: 57 BPM | BODY MASS INDEX: 21.41 KG/M2 | HEIGHT: 58 IN

## 2020-10-26 VITALS — HEART RATE: 62 BPM

## 2020-10-26 DIAGNOSIS — R13.10 DYSPHAGIA, UNSPECIFIED TYPE: ICD-10-CM

## 2020-10-26 LAB — SARS-COV-2 RNA RESP QL NAA+PROBE: NEGATIVE

## 2020-10-26 PROCEDURE — 43239 EGD BIOPSY SINGLE/MULTIPLE: CPT | Performed by: INTERNAL MEDICINE

## 2020-10-26 PROCEDURE — 88305 TISSUE EXAM BY PATHOLOGIST: CPT | Performed by: PATHOLOGY

## 2020-10-26 PROCEDURE — 87635 SARS-COV-2 COVID-19 AMP PRB: CPT | Performed by: INTERNAL MEDICINE

## 2020-10-26 RX ORDER — SODIUM CHLORIDE, SODIUM LACTATE, POTASSIUM CHLORIDE, CALCIUM CHLORIDE 600; 310; 30; 20 MG/100ML; MG/100ML; MG/100ML; MG/100ML
125 INJECTION, SOLUTION INTRAVENOUS CONTINUOUS
Status: DISCONTINUED | OUTPATIENT
Start: 2020-10-26 | End: 2020-10-30 | Stop reason: HOSPADM

## 2020-10-26 RX ORDER — PROPOFOL 10 MG/ML
INJECTION, EMULSION INTRAVENOUS CONTINUOUS PRN
Status: DISCONTINUED | OUTPATIENT
Start: 2020-10-26 | End: 2020-10-26

## 2020-10-26 RX ORDER — SODIUM CHLORIDE, SODIUM LACTATE, POTASSIUM CHLORIDE, CALCIUM CHLORIDE 600; 310; 30; 20 MG/100ML; MG/100ML; MG/100ML; MG/100ML
INJECTION, SOLUTION INTRAVENOUS CONTINUOUS PRN
Status: DISCONTINUED | OUTPATIENT
Start: 2020-10-26 | End: 2020-10-26

## 2020-10-26 RX ORDER — PROPOFOL 10 MG/ML
INJECTION, EMULSION INTRAVENOUS AS NEEDED
Status: DISCONTINUED | OUTPATIENT
Start: 2020-10-26 | End: 2020-10-26

## 2020-10-26 RX ORDER — NITROFURANTOIN MACROCRYSTALS 50 MG/1
50 CAPSULE ORAL
COMMUNITY

## 2020-10-26 RX ADMIN — PROPOFOL 100 MCG/KG/MIN: 10 INJECTION, EMULSION INTRAVENOUS at 12:09

## 2020-10-26 RX ADMIN — SODIUM CHLORIDE, SODIUM LACTATE, POTASSIUM CHLORIDE, AND CALCIUM CHLORIDE: .6; .31; .03; .02 INJECTION, SOLUTION INTRAVENOUS at 12:06

## 2020-10-26 RX ADMIN — PROPOFOL 30 MG: 10 INJECTION, EMULSION INTRAVENOUS at 12:09

## 2020-10-29 ENCOUNTER — HOSPITAL ENCOUNTER (OUTPATIENT)
Dept: RADIOLOGY | Facility: HOSPITAL | Age: 60
Discharge: HOME | End: 2020-10-29
Payer: COMMERCIAL

## 2020-10-29 DIAGNOSIS — R13.12 OROPHARYNGEAL DYSPHAGIA: ICD-10-CM

## 2020-10-29 PROCEDURE — 92611 MOTION FLUOROSCOPY/SWALLOW: CPT

## 2020-10-29 PROCEDURE — 74230 X-RAY XM SWLNG FUNCJ C+: CPT

## 2020-11-02 ENCOUNTER — TELEPHONE (OUTPATIENT)
Dept: GASTROENTEROLOGY | Facility: AMBULARY SURGERY CENTER | Age: 60
End: 2020-11-02

## 2020-11-02 ENCOUNTER — TELEMEDICINE (OUTPATIENT)
Dept: FAMILY MEDICINE CLINIC | Facility: CLINIC | Age: 60
End: 2020-11-02

## 2020-11-02 VITALS
RESPIRATION RATE: 16 BRPM | TEMPERATURE: 97.7 F | HEART RATE: 70 BPM | OXYGEN SATURATION: 98 % | DIASTOLIC BLOOD PRESSURE: 68 MMHG | BODY MASS INDEX: 20.77 KG/M2 | SYSTOLIC BLOOD PRESSURE: 112 MMHG | WEIGHT: 99.4 LBS

## 2020-11-02 DIAGNOSIS — M81.0 OSTEOPOROSIS, UNSPECIFIED OSTEOPOROSIS TYPE, UNSPECIFIED PATHOLOGICAL FRACTURE PRESENCE: ICD-10-CM

## 2020-11-02 DIAGNOSIS — G80.4 ATAXIC CEREBRAL PALSY (HCC): ICD-10-CM

## 2020-11-02 DIAGNOSIS — D72.19 EOSINOPHILIC LEUKOCYTOSIS, UNSPECIFIED TYPE: Primary | ICD-10-CM

## 2020-11-02 DIAGNOSIS — R13.10 DYSPHAGIA, UNSPECIFIED TYPE: ICD-10-CM

## 2020-11-02 PROCEDURE — 1036F TOBACCO NON-USER: CPT | Performed by: FAMILY MEDICINE

## 2020-11-02 PROCEDURE — 99213 OFFICE O/P EST LOW 20 MIN: CPT | Performed by: FAMILY MEDICINE

## 2020-11-03 ENCOUNTER — TELEMEDICINE (OUTPATIENT)
Dept: GASTROENTEROLOGY | Facility: CLINIC | Age: 60
End: 2020-11-03
Payer: COMMERCIAL

## 2020-11-03 VITALS
HEART RATE: 82 BPM | BODY MASS INDEX: 20.87 KG/M2 | WEIGHT: 99.4 LBS | TEMPERATURE: 97.7 F | SYSTOLIC BLOOD PRESSURE: 112 MMHG | DIASTOLIC BLOOD PRESSURE: 68 MMHG | HEIGHT: 58 IN

## 2020-11-03 DIAGNOSIS — K21.9 GASTROESOPHAGEAL REFLUX DISEASE: ICD-10-CM

## 2020-11-03 DIAGNOSIS — D53.9 MACROCYTIC ANEMIA: ICD-10-CM

## 2020-11-03 DIAGNOSIS — K59.09 OTHER CONSTIPATION: ICD-10-CM

## 2020-11-03 DIAGNOSIS — R13.12 OROPHARYNGEAL DYSPHAGIA: Primary | ICD-10-CM

## 2020-11-03 DIAGNOSIS — K56.2 SIGMOID VOLVULUS (HCC): ICD-10-CM

## 2020-11-03 PROCEDURE — 3078F DIAST BP <80 MM HG: CPT | Performed by: PHYSICIAN ASSISTANT

## 2020-11-03 PROCEDURE — 3074F SYST BP LT 130 MM HG: CPT | Performed by: PHYSICIAN ASSISTANT

## 2020-11-03 PROCEDURE — 3008F BODY MASS INDEX DOCD: CPT | Performed by: FAMILY MEDICINE

## 2020-11-03 PROCEDURE — 1036F TOBACCO NON-USER: CPT | Performed by: PHYSICIAN ASSISTANT

## 2020-11-03 PROCEDURE — 3008F BODY MASS INDEX DOCD: CPT | Performed by: PHYSICIAN ASSISTANT

## 2020-11-03 PROCEDURE — 99214 OFFICE O/P EST MOD 30 MIN: CPT | Performed by: PHYSICIAN ASSISTANT

## 2020-11-03 RX ORDER — OMEPRAZOLE 20 MG/1
20 CAPSULE, DELAYED RELEASE ORAL
Qty: 60 CAPSULE | Refills: 2 | Status: SHIPPED | OUTPATIENT
Start: 2020-11-03 | End: 2022-04-19

## 2020-11-04 ENCOUNTER — TELEPHONE (OUTPATIENT)
Dept: FAMILY MEDICINE CLINIC | Facility: CLINIC | Age: 60
End: 2020-11-04

## 2020-11-16 ENCOUNTER — TELEPHONE (OUTPATIENT)
Dept: FAMILY MEDICINE CLINIC | Facility: CLINIC | Age: 60
End: 2020-11-16

## 2020-11-16 DIAGNOSIS — R13.12 OROPHARYNGEAL DYSPHAGIA: ICD-10-CM

## 2020-11-18 ENCOUNTER — TELEPHONE (OUTPATIENT)
Dept: GASTROENTEROLOGY | Facility: AMBULARY SURGERY CENTER | Age: 60
End: 2020-11-18

## 2020-11-20 ENCOUNTER — TELEPHONE (OUTPATIENT)
Dept: GASTROENTEROLOGY | Facility: CLINIC | Age: 60
End: 2020-11-20

## 2020-11-25 ENCOUNTER — LAB (OUTPATIENT)
Dept: LAB | Facility: MEDICAL CENTER | Age: 60
End: 2020-11-25
Payer: COMMERCIAL

## 2020-11-25 DIAGNOSIS — M81.0 OSTEOPOROSIS, UNSPECIFIED OSTEOPOROSIS TYPE, UNSPECIFIED PATHOLOGICAL FRACTURE PRESENCE: ICD-10-CM

## 2020-11-25 LAB
ANION GAP SERPL CALCULATED.3IONS-SCNC: 1 MMOL/L (ref 4–13)
BUN SERPL-MCNC: 16 MG/DL (ref 5–25)
CALCIUM SERPL-MCNC: 9.5 MG/DL (ref 8.3–10.1)
CHLORIDE SERPL-SCNC: 105 MMOL/L (ref 100–108)
CO2 SERPL-SCNC: 37 MMOL/L (ref 21–32)
CREAT SERPL-MCNC: 0.87 MG/DL (ref 0.6–1.3)
GFR SERPL CREATININE-BSD FRML MDRD: 74 ML/MIN/1.73SQ M
GLUCOSE P FAST SERPL-MCNC: 155 MG/DL (ref 65–99)
POTASSIUM SERPL-SCNC: 4 MMOL/L (ref 3.5–5.3)
SODIUM SERPL-SCNC: 143 MMOL/L (ref 136–145)

## 2020-11-25 PROCEDURE — 80048 BASIC METABOLIC PNL TOTAL CA: CPT

## 2020-11-25 PROCEDURE — 36415 COLL VENOUS BLD VENIPUNCTURE: CPT

## 2020-12-04 ENCOUNTER — CLINICAL SUPPORT (OUTPATIENT)
Dept: FAMILY MEDICINE CLINIC | Facility: CLINIC | Age: 60
End: 2020-12-04

## 2020-12-04 DIAGNOSIS — E11.00 TYPE 2 DIABETES MELLITUS WITH HYPEROSMOLARITY WITHOUT COMA, WITHOUT LONG-TERM CURRENT USE OF INSULIN (HCC): Primary | ICD-10-CM

## 2020-12-04 DIAGNOSIS — K59.00 CONSTIPATION, UNSPECIFIED CONSTIPATION TYPE: Primary | ICD-10-CM

## 2020-12-04 DIAGNOSIS — M81.0 OSTEOPOROSIS, UNSPECIFIED OSTEOPOROSIS TYPE, UNSPECIFIED PATHOLOGICAL FRACTURE PRESENCE: ICD-10-CM

## 2020-12-04 PROCEDURE — 96372 THER/PROPH/DIAG INJ SC/IM: CPT

## 2020-12-04 RX ORDER — POLYETHYLENE GLYCOL 3350 17 G/17G
POWDER, FOR SOLUTION ORAL
Qty: 510 G | Refills: 10 | Status: SHIPPED | OUTPATIENT
Start: 2020-12-04

## 2020-12-07 ENCOUNTER — TELEMEDICINE (OUTPATIENT)
Dept: FAMILY MEDICINE CLINIC | Facility: CLINIC | Age: 60
End: 2020-12-07

## 2020-12-07 ENCOUNTER — TELEPHONE (OUTPATIENT)
Dept: FAMILY MEDICINE CLINIC | Facility: CLINIC | Age: 60
End: 2020-12-07

## 2020-12-07 VITALS
DIASTOLIC BLOOD PRESSURE: 53 MMHG | HEART RATE: 98 BPM | HEIGHT: 58 IN | BODY MASS INDEX: 21.62 KG/M2 | RESPIRATION RATE: 70 BRPM | WEIGHT: 103 LBS | SYSTOLIC BLOOD PRESSURE: 91 MMHG | TEMPERATURE: 96.3 F

## 2020-12-07 DIAGNOSIS — E11.00 TYPE 2 DIABETES MELLITUS WITH HYPEROSMOLARITY WITHOUT COMA, WITHOUT LONG-TERM CURRENT USE OF INSULIN (HCC): ICD-10-CM

## 2020-12-07 DIAGNOSIS — Z00.00 MEDICARE ANNUAL WELLNESS VISIT, SUBSEQUENT: ICD-10-CM

## 2020-12-07 DIAGNOSIS — Z91.89 AT RISK FOR POLYPHARMACY: Primary | ICD-10-CM

## 2020-12-07 PROCEDURE — 3008F BODY MASS INDEX DOCD: CPT | Performed by: PHYSICIAN ASSISTANT

## 2020-12-07 PROCEDURE — 3074F SYST BP LT 130 MM HG: CPT | Performed by: FAMILY MEDICINE

## 2020-12-07 PROCEDURE — 3078F DIAST BP <80 MM HG: CPT | Performed by: FAMILY MEDICINE

## 2020-12-07 PROCEDURE — 1036F TOBACCO NON-USER: CPT | Performed by: FAMILY MEDICINE

## 2020-12-07 PROCEDURE — 3008F BODY MASS INDEX DOCD: CPT | Performed by: FAMILY MEDICINE

## 2020-12-07 PROCEDURE — G0439 PPPS, SUBSEQ VISIT: HCPCS | Performed by: FAMILY MEDICINE

## 2020-12-29 ENCOUNTER — LAB (OUTPATIENT)
Dept: LAB | Facility: CLINIC | Age: 60
End: 2020-12-29
Payer: COMMERCIAL

## 2020-12-29 DIAGNOSIS — E11.00 TYPE 2 DIABETES MELLITUS WITH HYPEROSMOLARITY WITHOUT COMA, WITHOUT LONG-TERM CURRENT USE OF INSULIN (HCC): ICD-10-CM

## 2020-12-29 DIAGNOSIS — D72.19 EOSINOPHILIC LEUKOCYTOSIS, UNSPECIFIED TYPE: ICD-10-CM

## 2020-12-29 DIAGNOSIS — M81.0 OSTEOPOROSIS, UNSPECIFIED OSTEOPOROSIS TYPE, UNSPECIFIED PATHOLOGICAL FRACTURE PRESENCE: ICD-10-CM

## 2020-12-29 LAB
ANION GAP SERPL CALCULATED.3IONS-SCNC: 4 MMOL/L (ref 4–13)
BASOPHILS # BLD AUTO: 0.07 THOUSANDS/ΜL (ref 0–0.1)
BASOPHILS NFR BLD AUTO: 1 % (ref 0–1)
BUN SERPL-MCNC: 17 MG/DL (ref 5–25)
CALCIUM SERPL-MCNC: 9.8 MG/DL (ref 8.3–10.1)
CHLORIDE SERPL-SCNC: 106 MMOL/L (ref 100–108)
CO2 SERPL-SCNC: 35 MMOL/L (ref 21–32)
CREAT SERPL-MCNC: 0.86 MG/DL (ref 0.6–1.3)
EOSINOPHIL # BLD AUTO: 0.66 THOUSAND/ΜL (ref 0–0.61)
EOSINOPHIL NFR BLD AUTO: 9 % (ref 0–6)
ERYTHROCYTE [DISTWIDTH] IN BLOOD BY AUTOMATED COUNT: 11.9 % (ref 11.6–15.1)
GFR SERPL CREATININE-BSD FRML MDRD: 75 ML/MIN/1.73SQ M
GLUCOSE P FAST SERPL-MCNC: 102 MG/DL (ref 65–99)
HCT VFR BLD AUTO: 38.4 % (ref 34.8–46.1)
HGB BLD-MCNC: 12 G/DL (ref 11.5–15.4)
IMM GRANULOCYTES # BLD AUTO: 0.01 THOUSAND/UL (ref 0–0.2)
IMM GRANULOCYTES NFR BLD AUTO: 0 % (ref 0–2)
LYMPHOCYTES # BLD AUTO: 2.55 THOUSANDS/ΜL (ref 0.6–4.47)
LYMPHOCYTES NFR BLD AUTO: 36 % (ref 14–44)
MCH RBC QN AUTO: 32.5 PG (ref 26.8–34.3)
MCHC RBC AUTO-ENTMCNC: 31.3 G/DL (ref 31.4–37.4)
MCV RBC AUTO: 104 FL (ref 82–98)
MONOCYTES # BLD AUTO: 0.6 THOUSAND/ΜL (ref 0.17–1.22)
MONOCYTES NFR BLD AUTO: 9 % (ref 4–12)
NEUTROPHILS # BLD AUTO: 3.15 THOUSANDS/ΜL (ref 1.85–7.62)
NEUTS SEG NFR BLD AUTO: 45 % (ref 43–75)
NRBC BLD AUTO-RTO: 0 /100 WBCS
PLATELET # BLD AUTO: 152 THOUSANDS/UL (ref 149–390)
PMV BLD AUTO: 12.6 FL (ref 8.9–12.7)
POTASSIUM SERPL-SCNC: 3.8 MMOL/L (ref 3.5–5.3)
RBC # BLD AUTO: 3.69 MILLION/UL (ref 3.81–5.12)
SODIUM SERPL-SCNC: 145 MMOL/L (ref 136–145)
WBC # BLD AUTO: 7.04 THOUSAND/UL (ref 4.31–10.16)

## 2020-12-29 PROCEDURE — 36415 COLL VENOUS BLD VENIPUNCTURE: CPT

## 2020-12-29 PROCEDURE — 85025 COMPLETE CBC W/AUTO DIFF WBC: CPT

## 2020-12-29 PROCEDURE — 80048 BASIC METABOLIC PNL TOTAL CA: CPT

## 2021-01-12 ENCOUNTER — TELEMEDICINE (OUTPATIENT)
Dept: FAMILY MEDICINE CLINIC | Facility: CLINIC | Age: 61
End: 2021-01-12

## 2021-01-12 VITALS
BODY MASS INDEX: 21.21 KG/M2 | DIASTOLIC BLOOD PRESSURE: 74 MMHG | HEART RATE: 87 BPM | SYSTOLIC BLOOD PRESSURE: 98 MMHG | WEIGHT: 101.5 LBS | OXYGEN SATURATION: 98 % | TEMPERATURE: 97.3 F

## 2021-01-12 DIAGNOSIS — E03.8 OTHER SPECIFIED HYPOTHYROIDISM: ICD-10-CM

## 2021-01-12 DIAGNOSIS — E78.5 HYPERLIPIDEMIA, UNSPECIFIED HYPERLIPIDEMIA TYPE: ICD-10-CM

## 2021-01-12 DIAGNOSIS — F31.9 BIPOLAR AFFECTIVE DISORDER, REMISSION STATUS UNSPECIFIED (HCC): ICD-10-CM

## 2021-01-12 DIAGNOSIS — D72.19 EOSINOPHILIC LEUKOCYTOSIS, UNSPECIFIED TYPE: ICD-10-CM

## 2021-01-12 DIAGNOSIS — K21.00 GASTROESOPHAGEAL REFLUX DISEASE WITH ESOPHAGITIS WITHOUT HEMORRHAGE: ICD-10-CM

## 2021-01-12 DIAGNOSIS — E11.00 TYPE 2 DIABETES MELLITUS WITH HYPEROSMOLARITY WITHOUT COMA, WITHOUT LONG-TERM CURRENT USE OF INSULIN (HCC): Primary | ICD-10-CM

## 2021-01-12 DIAGNOSIS — R13.12 OROPHARYNGEAL DYSPHAGIA: ICD-10-CM

## 2021-01-12 DIAGNOSIS — M81.0 OSTEOPOROSIS, UNSPECIFIED OSTEOPOROSIS TYPE, UNSPECIFIED PATHOLOGICAL FRACTURE PRESENCE: ICD-10-CM

## 2021-01-12 DIAGNOSIS — Z20.822 EXPOSURE TO COVID-19 VIRUS: ICD-10-CM

## 2021-01-12 PROCEDURE — 1036F TOBACCO NON-USER: CPT | Performed by: FAMILY MEDICINE

## 2021-01-12 PROCEDURE — 3074F SYST BP LT 130 MM HG: CPT | Performed by: FAMILY MEDICINE

## 2021-01-12 PROCEDURE — 3078F DIAST BP <80 MM HG: CPT | Performed by: FAMILY MEDICINE

## 2021-01-12 PROCEDURE — 99213 OFFICE O/P EST LOW 20 MIN: CPT | Performed by: FAMILY MEDICINE

## 2021-01-12 NOTE — LETTER
January 12, 2021     [facility]    Patient: Dex Nevarez   YOB: 1960   Date of Visit: 1/12/2021       Dear [facility]    Thank you for referring René Jennifer to me for evaluation  Below are my notes for this recent visit  If you have questions, please do not hesitate to call me  Sincerely,        Sully Pérez DO        CC: No Recipients  Sully Pérez DO  1/12/2021 11:57 AM  Sign when Signing Visit    Virtual Regular Visit      Assessment/Plan:    Problem List Items Addressed This Visit        Digestive    Gastroesophageal reflux disease     Stable  Continue Omeprazole 20mg BID         Dysphagia     -Improving with no recent episodes of aspiration  -Finished Speech therapy session in December  -Repeat video barium swallow test 10/29 showed moderate oral/ pharyngeal dsyphagia with penetration noted with NTL, improvement in swallowing of honey thick liquids by tsp   Aspiration risk noted with NTL by tsp and straw due to observed penetration to VC    - Recommendation is to continue the patient on pureed diet and honey thick liquid by tsp,  -continue meds crushed in puree  -EGD performed by GI on 10/26, negative for H  Pylori or intestional metaplasia, no evidence of eosinophilic esophagitis, no need for repeat EGD at this time  -Continue Omeprazole 20mg BID  -Aspiration precautions            Endocrine    Type 2 diabetes mellitus, without long-term current use of insulin ([address] ) - Primary       Lab Results   Component Value Date    HGBA1C 6 2 (H) 10/21/2020     -Patient demonstrating good control of T2DM currently  -Continue current regimen of Metformin 1000 mg BID  - Patient will need recheck on A1C, orders placed         Hypothyroidism     Controlled and without symptoms suggestive of hypo or hyperthyroidism  Last TSH in September 2020 was WNL at 0 714  Continue Synthroid 25 mcg daily            Musculoskeletal and Integument    Osteoporosis     -History of osteoporosis with a T-score of -3 1 in the right hip  on last DEXA scan in March 2020  -She is also receiving Prolia injections with last Prolia injection in December 4th 2020  -Post Prolia labs reviewed and discussed with Jaqueline Mcgraw patient's nurse          Relevant Orders    CK (with reflex to MB)       Other    Hyperlipidemia    Relevant Orders    Lipid panel    Bipolar disorder ([facility] )    Relevant Orders    Comprehensive metabolic panel    CK (with reflex to MB)    Eosinophilic leukocytosis     Improving  -Repeat CBC collected 12/29 showed WBC at 7 04 from 7 89 and Relative Eosinophil level decreased from 14 to 9  -Uncertain etiology  -UA and UCx negative for infection  -EGD biopsy showed no evidence of eosinophilic esophagitis  -CXR was negative for signs of aspiration and showed mild distention of the bowel with elevation of the left hemidiaphragm  -Will repeat in 6 months         Exposure to COVID-19 virus     -Lives at [facility] facility and was exposed to two staff members were recently tested positive for COVID  -Last known exposure on 1/8/21  -Patient remains asymptomatic   -COVID test orders placed         Relevant Orders    Novel Coronavirus (COVID-19), PCR [facility]        Follow up in 3 months       Reason for visit is   Chief Complaint   Patient presents with    Virtual Regular Visit        Encounter provider Aissatou Berkowitz DO    Provider located at [facility] [id number] [facility] [address] [phone]      Recent Visits  No visits were found meeting these conditions  Showing recent visits within past 7 days and meeting all other requirements     Today's Visits  Date Type Provider Dept   01/12/21 [address] [place] [address] today's visits and meeting all other requirements     Future Appointments  No visits were found meeting these conditions     Showing future appointments within next 150 days and meeting all other requirements        The patient was identified by name and date of birth  Antonio Covington was informed that this is a telemedicine visit and that the visit is being conducted through [employer] and patient was informed that this is a secure, HIPAA-compliant platform  She agrees to proceed     My office door was closed  No one else was in the room  She acknowledged consent and understanding of privacy and security of the video platform  The patient has agreed to participate and understands they can discontinue the visit at any time  Patient is aware this is a billable service  Subjective  Antonio Covington is a 64 y o  female with hx of Cerebral Palsy, Type 2DM, Hypothyroidism, s/p Hemicolectomy for volvulus and recurrent UTI's who presents today with her nurses [facility] and Orquidea Orozco for 3 month follow up  She was last seen on  11/2/20 for routine visit  Per Orquidea Orozco, patient has been doing well overall  She is eating well, mostly 100% of her meals and has not had any episodes of coughing or aspiration following meals  She is currently on dysphagia diet and now on Honey thick liquid per recommendations of Speech Therapy  Patient finished speech therapy session last month and patient does not require another barium swallow test  They are ensuring to monitor for possible aspiration following feels by checking SpO2 before and after meals  In regards to screening  Patient is due for colonoscopy and will need inpatient prep with NG tube to ensure proper prep however both GI and nursing staff are in agreement to delay the colonoscopy at this time due to COVID to decrease possible exposure  No bowel changes or blood in stool  Recent labs were reviewed with showed improvement in her Eosinophilic leukocytosis  In addition, patient is in need of COVID testing following close exposure to two staff members who recently tested positive for COVID  Patient was last exposed on 1/8/21   Staff members were asymptomatic when tested positive and remain asymptomatic  Patient as well continues to be asymptomatic  Patient received last Prolia injection December 4th  Past Medical History:   Diagnosis Date    Adjustment disorder     Altered mental status 12/11/2015    Anemia     Bipolar 1 disorder (HCC)     [facility] )     Chronic hypernatremia 2/6/2016    Closed fracture of left hip ([facility] ) 1/19/2016    Closed left hip fracture (HCC)     no surgery    Constipation     Dehydration 2/20/2016    Diabetes mellitus ([place] [address] )     Disease of thyroid gland     Diverticulosis     Fracture of multiple ribs of right side     Hip fracture ([place] [address] ) 07/26/2015    left    Hyperlipidemia     Hypernatremia 12/28/2018    Hypotension     Impulse control disorder     Incontinence     Intellectual disability due to developmental disorder, unspecified     Microalbuminuria     Osteopathia     Osteoporosis     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[place] [address] )     Thrombocytopenia ([place] [address] ) 7/31/2015       Past Surgical History:   Procedure Laterality Date    ABDOMINAL SURGERY      COLECTOMY MIN      re-anastomosis 7/22/16    COLONOSCOPY N/A 7/21/2016    Procedure: COLONOSCOPY;  Surgeon: Enedelia Inman MD;  [facility]; Service:     COLONOSCOPY N/A 1/31/2016    Procedure: COLONOSCOPY;  Surgeon: Enedelia Inman MD;  [facility];  Service:     COLONOSCOPY N/A 7/25/2017    Procedure: COLONOSCOPY;  Surgeon: Enedelia Inman MD;  [facility];   Service: Colorectal    COLOSTOMY      EXPLORATORY LAPAROTOMY W/ BOWEL RESECTION N/A 1/31/2016    Procedure: exploratory laparotomy, left sigmoidectomy, coloproctostomy, take down splenic flexure, loop colostomy;  Surgeon: Enedelia Inman MD;  [facility];  Service:     [place] CLOSE ENTEROSTOMY N/A 7/22/2016    Procedure: SEGMENTAL COLECTOMY WITH COLOCOLOSTOMY;  Surgeon: Enedelia Inman MD;  [facility];  Service: Colorectal    UPPER GASTROINTESTINAL ENDOSCOPY         Current Outpatient Medications   Medication Sig Dispense Refill    acetaminophen (TYLENOL) 325 mg tablet Take 2 tablets (650 mg total) by mouth every 6 (six) hours as needed (pain, fever greater than 100 4) 240 tablet 0    ARIPiprazole (ABILIFY) 30 mg tablet Take 1 tablet (30 mg) by mouth daily at 8 pm  0    baclofen 10 mg tablet Take 1 tablet (10 mg total) by mouth 3 (three) times a day 90 tablet 11    benzonatate (TESSALON PERLES) 100 mg capsule Take 1 capsule (100 mg total) by mouth every 8 (eight) hours as needed for cough 30 capsule 0    calcium carbonate (OS-MORRIS) 600 MG tablet Take 1 tablet (600 mg total) by mouth 2 (two) times a day with meals 60 tablet 2    carbamide peroxide (DEBROX) 6 5 % otic solution Administer 2 drops into both ears daily as needed 2 drops in the affected ear prn x 5 days      carboxymethylcellulose (REFRESH TEARS) 0 5 % SOLN 1 drop 3 (three) times a day as needed for dry eyes      Cholecalciferol (Vitamin D) 50 MCG (2000 UT) tablet Take 1 tablet (2,000 Units total) by mouth daily 30 tablet 5    Citalopram Hydrobromide (CELEXA PO) Take 40 mg by mouth every morning        denosumab (PROLIA) 60 mg/mL Inject 1 mL (60 mg total) under the skin once for 1 dose 1 mL 1    docosanol (ABREVA) 10 % Apply topically 5 (five) times a day Apply and rub in 5x a day until healed as needed for lesion  0    ferrous sulfate 324 (65 Fe) mg Take 325 mg by mouth 2 (two) times a day   fexofenadine (ALLEGRA) 180 MG tablet Take 180 mg by mouth as needed        fluticasone (FLONASE) 50 mcg/act nasal spray 1 spray into each nostril daily as needed for rhinitis or allergies 1 Bottle 1    hydrocortisone 1 % cream Apply topically to affected area twice daily as needed for rash 30 g 0    levothyroxine (SYNTHROID) 25 mcg tablet Take 1 tablet (25 mcg) by mouth every morning  0    LORazepam (ATIVAN) 0 5 mg tablet Take 0 5 mg by mouth 2 (two) times a day          metFORMIN (GLUCOPHAGE) 1000 MG tablet Take 1 tablet (1,000 mg total) by mouth 2 (two) times a day with meals 60 tablet 2    neomycin-bacitracin-polymyxin b (NEOSPORIN) ointment Apply 1 application topically 2 (two) times a day as needed      nitrofurantoin (MACRODANTIN) 50 mg capsule Take 50 mg by mouth daily at bedtime      olopatadine HCl (PATADAY) 0 2 % opth drops Administer 1 drop to both eyes as needed      omeprazole (PriLOSEC) 20 mg delayed release capsule Take 1 capsule (20 mg total) by mouth 2 (two) times a day before meals 60 capsule 2    Oxybutynin Chloride (DITROPAN XL PO) Take 5 mg by mouth 3 (three) times a day       polyethylene glycol (GLYCOLAX) 17 GM/SCOOP powder DISSOLVE 17GM (1 CAPFUL) IN 8 OZ FLUIDS AND CONSUME BY MOUTH EVERY MORNING *HOLD 1 DAY FOR LOOSE STOOLS* (CONSTIPATION) 510 g 10    simvastatin (ZOCOR) 20 mg tablet Take 1 tablet (20 mg total) by mouth daily with dinner at 4 pm for hyperlipidemia      Starch, Thickening, POWD Take by mouth daily Use on food and liquid as directed 1020 g 3    traZODone (DESYREL) 100 mg tablet Take 1 tablet (100 mg total) by mouth daily at  8 pm for depression  0    valproic acid (DEPAKENE) 250 MG/5ML soln Take 10 mL (500 mg total) by mouth 2 (two) times a day 600 mL 1    Vitamins A & D (VITAMIN A & D) ointment Apply topically as needed for dry skin       No current facility-administered medications for this visit  No Known Allergies    Review of Systems   Constitutional: Negative for appetite change, fatigue and fever  HENT: Negative for congestion and rhinorrhea  Respiratory: Negative for cough, choking and shortness of breath  Cardiovascular: Negative for leg swelling  Gastrointestinal: Negative for abdominal distention, blood in stool, constipation, diarrhea and vomiting  Genitourinary: Negative for difficulty urinating and hematuria  Skin: Negative for rash and wound  Neurological: Negative for syncope  Psychiatric/Behavioral: Negative for behavioral problems  Video Exam    Vitals:    01/12/21 1143   BP: 98/74   Pulse: 87   Temp: (!) 97 3 °F (36 3 °C)   SpO2: 98%   Weight: 46 kg (101 lb 8 oz)       Physical Exam  Vitals signs reviewed  Constitutional:       General: She is not in acute distress  Appearance: She is not ill-appearing or toxic-appearing  HENT:      Head: Normocephalic  Nose: Nose normal       Mouth/Throat:      Mouth: Mucous membranes are moist    Neck:      Musculoskeletal: Neck supple  Cardiovascular:      Rate and Rhythm: Normal rate and regular rhythm  Pulmonary:      Effort: Pulmonary effort is normal  No respiratory distress  Breath sounds: Normal breath sounds  No wheezing or rales  Abdominal:      General: Abdomen is flat  Bowel sounds are normal  There is no distension  Palpations: Abdomen is soft  Tenderness: There is no abdominal tenderness  Musculoskeletal:      Right lower leg: No edema  Left lower leg: No edema  Skin:     General: Skin is warm and dry  Neurological:      Mental Status: She is alert  (Physical exam done by nursing staff at facility)    I spent 30 minutes directly with the patient during this visit      VIRTUAL VISIT Haresh acknowledges that she has consented to an online visit or consultation  She understands that the online visit is based solely on information provided by her, and that, in the absence of a face-to-face physical evaluation by the physician, the diagnosis she receives is both limited and provisional in terms of accuracy and completeness  This is not intended to replace a full medical face-to-face evaluation by the physician  Sandi Cárdenas understands and accepts these terms

## 2021-01-12 NOTE — ASSESSMENT & PLAN NOTE
-Lives at [facility] facility and was exposed to two staff members were recently tested positive for COVID  -Last known exposure on 1/8/21  -Patient remains asymptomatic   -COVID test orders placed

## 2021-01-12 NOTE — PROGRESS NOTES
Virtual Regular Visit      Assessment/Plan:    Problem List Items Addressed This Visit        Digestive    Gastroesophageal reflux disease     Stable  Continue Omeprazole 20mg BID         Dysphagia     -Improving with no recent episodes of aspiration  -Finished Speech therapy session in December  -Repeat video barium swallow test 10/29 showed moderate oral/ pharyngeal dsyphagia with penetration noted with NTL, improvement in swallowing of honey thick liquids by tsp   Aspiration risk noted with NTL by tsp and straw due to observed penetration to VC    - Recommendation is to continue the patient on pureed diet and honey thick liquid by tsp,  -continue meds crushed in puree  -EGD performed by GI on 10/26, negative for H  Pylori or intestional metaplasia, no evidence of eosinophilic esophagitis, no need for repeat EGD at this time  -Continue Omeprazole 20mg BID  -Aspiration precautions            Endocrine    Type 2 diabetes mellitus, without long-term current use of insulin ([facility] [address] ) - Primary       Lab Results   Component Value Date    HGBA1C 6 2 (H) 10/21/2020     -Patient demonstrating good control of T2DM currently  -Continue current regimen of Metformin 1000 mg BID  - Patient will need recheck on A1C, orders placed         Hypothyroidism     Controlled and without symptoms suggestive of hypo or hyperthyroidism  Last TSH in September 2020 was WNL at 0 714  Continue Synthroid 25 mcg daily            Musculoskeletal and Integument    Osteoporosis     -History of osteoporosis with a T-score of -3 1 in the right hip  on last DEXA scan in March 2020  -She is also receiving Prolia injections with last Prolia injection in December 4th 2020  -Post Prolia labs reviewed and discussed with Kamryn Ferris patient's nurse          Relevant Orders    CK (with reflex to MB)       Other    Hyperlipidemia    Relevant Orders    Lipid panel    Bipolar disorder ([facility] [address] )    Relevant Orders    Comprehensive metabolic panel    CK (with reflex to MB) Eosinophilic leukocytosis     Improving  -Repeat CBC collected 12/29 showed WBC at 7 04 from 7 89 and Relative Eosinophil level decreased from 14 to 9  -Uncertain etiology  -UA and UCx negative for infection  -EGD biopsy showed no evidence of eosinophilic esophagitis  -CXR was negative for signs of aspiration and showed mild distention of the bowel with elevation of the left hemidiaphragm  -Will repeat in 6 months         Exposure to COVID-19 virus     -Lives at [facility] and was exposed to two staff members were recently tested positive for COVID  -Last known exposure on 1/8/21  -Patient remains asymptomatic   -COVID test orders placed         Relevant Orders    Novel Coronavirus (COVID-19), PCR [facility] Collected at [facility]        Follow up in 3 months       Reason for visit is   Chief Complaint   Patient presents with    Virtual Regular Visit        Encounter provider Adin Guajardo DO    Provider located at [address] [address]  [phone]      Recent Visits  No visits were found meeting these conditions  Showing recent visits within past 7 days and meeting all other requirements     Today's Visits  Date Type Provider Dept   01/12/21 [address] [place] [address] today's visits and meeting all other requirements     Future Appointments  No visits were found meeting these conditions  Showing future appointments within next 150 days and meeting all other requirements        The patient was identified by name and date of birth  Melony Robin was informed that this is a telemedicine visit and that the visit is being conducted through [employer] and patient was informed that this is a secure, HIPAA-compliant platform  She agrees to proceed     My office door was closed  No one else was in the room    She acknowledged consent and understanding of privacy and security of the video platform  The patient has agreed to participate and understands they can discontinue the visit at any time  Patient is aware this is a billable service  Subjective  Nasrin Mayen is a 64 y o  female with hx of Cerebral Palsy, Type 2DM, Hypothyroidism, s/p Hemicolectomy for volvulus and recurrent UTI's who presents today with her nurses Ilana Ludwig and Miguel Lomeli for 3 month follow up  She was last seen on  11/2/20 for routine visit  Per Miguel Lomeli, patient has been doing well overall  She is eating well, mostly 100% of her meals and has not had any episodes of coughing or aspiration following meals  She is currently on dysphagia diet and now on Honey thick liquid per recommendations of Speech Therapy  Patient finished speech therapy session last month and patient does not require another barium swallow test  They are ensuring to monitor for possible aspiration following feels by checking SpO2 before and after meals  In regards to screening  Patient is due for colonoscopy and will need inpatient prep with NG tube to ensure proper prep however both GI and nursing staff are in agreement to delay the colonoscopy at this time due to COVID to decrease possible exposure  No bowel changes or blood in stool  Recent labs were reviewed with showed improvement in her Eosinophilic leukocytosis  In addition, patient is in need of COVID testing following close exposure to two staff members who recently tested positive for COVID  Patient was last exposed on 1/8/21  Staff members were asymptomatic when tested positive and remain asymptomatic  Patient as well continues to be asymptomatic  Patient received last Prolia injection December 4th              Past Medical History:   Diagnosis Date    Adjustment disorder     Altered mental status 12/11/2015    Anemia     Bipolar 1 disorder (HCC)     Cerebral palsy ([facility] [address] )     Chronic hypernatremia 2/6/2016    Closed fracture of left hip ([facility] [address] ) 1/19/2016    Closed left hip fracture [facility])     no surgery    Constipation     Dehydration 2/20/2016    Diabetes mellitus ([facility] [address] )     Disease of thyroid gland     Diverticulosis     Fracture of multiple ribs of right side     Hip fracture (HCC) 07/26/2015    left    Hyperlipidemia     Hypernatremia 12/28/2018    Hypotension     Impulse control disorder     Incontinence     Intellectual disability due to developmental disorder, unspecified     Microalbuminuria     Osteopathia     Osteoporosis     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HCC)     Thrombocytopenia ([facility] [address] ) 7/31/2015       Past Surgical History:   Procedure Laterality Date    ABDOMINAL SURGERY      COLECTOMY MIN      re-anastomosis 7/22/16    COLONOSCOPY N/A 7/21/2016    Procedure: COLONOSCOPY;  Surgeon: Annamarie Garvin MD;  Location: [facility]; Service:     COLONOSCOPY N/A 1/31/2016    Procedure: COLONOSCOPY;  Surgeon: Annamarie Garvin MD;  [facility];  Service:     COLONOSCOPY N/A 7/25/2017    Procedure: COLONOSCOPY;  Surgeon: Annamarie Garvin MD;  [facility]: [facility];   Service: Colorectal    COLOSTOMY      EXPLORATORY LAPAROTOMY W/ BOWEL RESECTION N/A 1/31/2016    Procedure: exploratory laparotomy, left sigmoidectomy, coloproctostomy, take down splenic flexure, loop colostomy;  Surgeon: Annamarie Garvin MD;  [facility];  Service:     NM CLOSE ENTEROSTOMY N/A 7/22/2016    Procedure: SEGMENTAL COLECTOMY WITH COLOCOLOSTOMY;  Surgeon: Annamarie Garvin MD;  [facility];  Service: Colorectal    UPPER GASTROINTESTINAL ENDOSCOPY         Current Outpatient Medications   Medication Sig Dispense Refill    acetaminophen (TYLENOL) 325 mg tablet Take 2 tablets (650 mg total) by mouth every 6 (six) hours as needed (pain, fever greater than 100 4) 240 tablet 0    ARIPiprazole (ABILIFY) 30 mg tablet Take 1 tablet (30 mg) by mouth daily at 8 pm  0    baclofen 10 mg tablet Take 1 tablet (10 mg total) by mouth 3 (three) times a day 90 tablet 11    benzonatate (TESKERWIN WATKINS) 100 mg capsule Take 1 capsule (100 mg total) by mouth every 8 (eight) hours as needed for cough 30 capsule 0    calcium carbonate (OS-MORRIS) 600 MG tablet Take 1 tablet (600 mg total) by mouth 2 (two) times a day with meals 60 tablet 2    carbamide peroxide (DEBROX) 6 5 % otic solution Administer 2 drops into both ears daily as needed 2 drops in the affected ear prn x 5 days      carboxymethylcellulose (REFRESH TEARS) 0 5 % SOLN 1 drop 3 (three) times a day as needed for dry eyes      Cholecalciferol (Vitamin D) 50 MCG (2000 UT) tablet Take 1 tablet (2,000 Units total) by mouth daily 30 tablet 5    Citalopram Hydrobromide (CELEXA PO) Take 40 mg by mouth every morning        denosumab (PROLIA) 60 mg/mL Inject 1 mL (60 mg total) under the skin once for 1 dose 1 mL 1    docosanol (ABREVA) 10 % Apply topically 5 (five) times a day Apply and rub in 5x a day until healed as needed for lesion  0    ferrous sulfate 324 (65 Fe) mg Take 325 mg by mouth 2 (two) times a day   fexofenadine (ALLEGRA) 180 MG tablet Take 180 mg by mouth as needed        fluticasone (FLONASE) 50 mcg/act nasal spray 1 spray into each nostril daily as needed for rhinitis or allergies 1 Bottle 1    hydrocortisone 1 % cream Apply topically to affected area twice daily as needed for rash 30 g 0    levothyroxine (SYNTHROID) 25 mcg tablet Take 1 tablet (25 mcg) by mouth every morning  0    LORazepam (ATIVAN) 0 5 mg tablet Take 0 5 mg by mouth 2 (two) times a day          metFORMIN (GLUCOPHAGE) 1000 MG tablet Take 1 tablet (1,000 mg total) by mouth 2 (two) times a day with meals 60 tablet 2    neomycin-bacitracin-polymyxin b (NEOSPORIN) ointment Apply 1 application topically 2 (two) times a day as needed      nitrofurantoin (MACRODANTIN) 50 mg capsule Take 50 mg by mouth daily at bedtime      olopatadine HCl (PATADAY) 0 2 % opth drops Administer 1 drop to both eyes as needed      omeprazole (PriLOSEC) 20 mg delayed release capsule Take 1 capsule (20 mg total) by mouth 2 (two) times a day before meals 60 capsule 2    Oxybutynin Chloride (DITROPAN XL PO) Take 5 mg by mouth 3 (three) times a day       polyethylene glycol (GLYCOLAX) 17 GM/SCOOP powder DISSOLVE 17GM (1 CAPFUL) IN 8 OZ FLUIDS AND CONSUME BY MOUTH EVERY MORNING *HOLD 1 DAY FOR LOOSE STOOLS* (CONSTIPATION) 510 g 10    simvastatin (ZOCOR) 20 mg tablet Take 1 tablet (20 mg total) by mouth daily with dinner at 4 pm for hyperlipidemia      Starch, Thickening, POWD Take by mouth daily Use on food and liquid as directed 1020 g 3    traZODone (DESYREL) 100 mg tablet Take 1 tablet (100 mg total) by mouth daily at  8 pm for depression  0    valproic acid (DEPAKENE) 250 MG/5ML soln Take 10 mL (500 mg total) by mouth 2 (two) times a day 600 mL 1    Vitamins A & D (VITAMIN A & D) ointment Apply topically as needed for dry skin       No current facility-administered medications for this visit  No Known Allergies    Review of Systems   Constitutional: Negative for appetite change, fatigue and fever  HENT: Negative for congestion and rhinorrhea  Respiratory: Negative for cough, choking and shortness of breath  Cardiovascular: Negative for leg swelling  Gastrointestinal: Negative for abdominal distention, blood in stool, constipation, diarrhea and vomiting  Genitourinary: Negative for difficulty urinating and hematuria  Skin: Negative for rash and wound  Neurological: Negative for syncope  Psychiatric/Behavioral: Negative for behavioral problems  Video Exam    Vitals:    01/12/21 1143   BP: 98/74   Pulse: 87   Temp: (!) 97 3 °F (36 3 °C)   SpO2: 98%   Weight: 46 kg (101 lb 8 oz)       Physical Exam  Vitals signs reviewed  Constitutional:       General: She is not in acute distress  Appearance: She is not ill-appearing or toxic-appearing  HENT:      Head: Normocephalic        Nose: Nose normal       Mouth/Throat:      Mouth: Mucous membranes are moist    Neck:      Musculoskeletal: Neck supple  Cardiovascular:      Rate and Rhythm: Normal rate and regular rhythm  Pulmonary:      Effort: Pulmonary effort is normal  No respiratory distress  Breath sounds: Normal breath sounds  No wheezing or rales  Abdominal:      General: Abdomen is flat  Bowel sounds are normal  There is no distension  Palpations: Abdomen is soft  Tenderness: There is no abdominal tenderness  Musculoskeletal:      Right lower leg: No edema  Left lower leg: No edema  Skin:     General: Skin is warm and dry  Neurological:      Mental Status: She is alert  (Physical exam done by nursing staff at facility)    I spent 30 minutes directly with the patient during this visit      VIRTUAL VISIT Haresh acknowledges that she has consented to an online visit or consultation  She understands that the online visit is based solely on information provided by her, and that, in the absence of a face-to-face physical evaluation by the physician, the diagnosis she receives is both limited and provisional in terms of accuracy and completeness  This is not intended to replace a full medical face-to-face evaluation by the physician  Dex Nevarez understands and accepts these terms

## 2021-01-12 NOTE — ASSESSMENT & PLAN NOTE
-History of osteoporosis with a T-score of -3 1 in the right hip  on last DEXA scan in March 2020  -She is also receiving Prolia injections with last Prolia injection in December 4th 2020  -Post Prolia labs reviewed and discussed with Angelina Ovalles, patient's nurse

## 2021-01-12 NOTE — ASSESSMENT & PLAN NOTE
Improving  -Repeat CBC collected 12/29 showed WBC at 7 04 from 7 89 and Relative Eosinophil level decreased from 14 to 9  -Uncertain etiology  -UA and UCx negative for infection  -EGD biopsy showed no evidence of eosinophilic esophagitis  -CXR was negative for signs of aspiration and showed mild distention of the bowel with elevation of the left hemidiaphragm  -Will repeat in 6 months

## 2021-01-12 NOTE — ASSESSMENT & PLAN NOTE
Lab Results   Component Value Date    HGBA1C 6 2 (H) 10/21/2020     -Patient demonstrating good control of T2DM currently  -Continue current regimen of Metformin 1000 mg BID  - Patient will need recheck on A1C, orders placed

## 2021-01-12 NOTE — ASSESSMENT & PLAN NOTE
Controlled and without symptoms suggestive of hypo or hyperthyroidism  Last TSH in September 2020 was WNL at 0 714  Continue Synthroid 25 mcg daily

## 2021-01-12 NOTE — ASSESSMENT & PLAN NOTE
-Improving with no recent episodes of aspiration  -Finished Speech therapy session in December  -Repeat video barium swallow test 10/29 showed moderate oral/ pharyngeal dsyphagia with penetration noted with NTL, improvement in swallowing of honey thick liquids by tsp   Aspiration risk noted with NTL by tsp and straw due to observed penetration to VC    - Recommendation is to continue the patient on pureed diet and honey thick liquid by tsp,  -continue meds crushed in puree  -EGD performed by GI on 10/26, negative for H  Pylori or intestional metaplasia, no evidence of eosinophilic esophagitis, no need for repeat EGD at this time  -Continue Omeprazole 20mg BID  -Aspiration precautions

## 2021-01-19 ENCOUNTER — TELEPHONE (OUTPATIENT)
Dept: LAB | Facility: HOSPITAL | Age: 61
End: 2021-01-19

## 2021-01-20 ENCOUNTER — TELEPHONE (OUTPATIENT)
Dept: LAB | Facility: HOSPITAL | Age: 61
End: 2021-01-20

## 2021-01-20 LAB — EXT SARS-COV-2: NEGATIVE

## 2021-01-25 ENCOUNTER — TELEPHONE (OUTPATIENT)
Dept: NEUROLOGY | Facility: CLINIC | Age: 61
End: 2021-01-25

## 2021-01-25 NOTE — TELEPHONE ENCOUNTER
Rescheduled Dr Pierre Mcneal appt with Dr Lore Clemons  [facility] scheduled appointments for residents and wrote them down

## 2021-01-28 ENCOUNTER — TELEPHONE (OUTPATIENT)
Dept: FAMILY MEDICINE CLINIC | Facility: CLINIC | Age: 61
End: 2021-01-28

## 2021-01-28 NOTE — TELEPHONE ENCOUNTER
Caregiver Lisa cota, she called [facility] and they told her they checked both systems and had no record of a prior auth for Macro[employer], they gave her a different fax # to send Clarisa Soto to [phone]

## 2021-02-17 ENCOUNTER — APPOINTMENT (OUTPATIENT)
Dept: LAB | Facility: HOSPITAL | Age: 61
End: 2021-02-17
Payer: COMMERCIAL

## 2021-02-17 DIAGNOSIS — E11.00 TYPE 2 DIABETES MELLITUS WITH HYPEROSMOLARITY WITHOUT COMA, WITHOUT LONG-TERM CURRENT USE OF INSULIN (HCC): ICD-10-CM

## 2021-02-17 DIAGNOSIS — M81.0 OSTEOPOROSIS, UNSPECIFIED OSTEOPOROSIS TYPE, UNSPECIFIED PATHOLOGICAL FRACTURE PRESENCE: ICD-10-CM

## 2021-02-17 DIAGNOSIS — E78.5 HYPERLIPIDEMIA, UNSPECIFIED HYPERLIPIDEMIA TYPE: ICD-10-CM

## 2021-02-17 DIAGNOSIS — F31.9 BIPOLAR AFFECTIVE DISORDER, REMISSION STATUS UNSPECIFIED (HCC): ICD-10-CM

## 2021-02-17 LAB
EST. AVERAGE GLUCOSE BLD GHB EST-MCNC: 160 MG/DL
HBA1C MFR BLD: 7.2 %

## 2021-02-17 PROCEDURE — 83036 HEMOGLOBIN GLYCOSYLATED A1C: CPT

## 2021-02-17 PROCEDURE — 36415 COLL VENOUS BLD VENIPUNCTURE: CPT

## 2021-02-17 PROCEDURE — 3051F HG A1C>EQUAL 7.0%<8.0%: CPT | Performed by: FAMILY MEDICINE

## 2021-03-01 ENCOUNTER — TRANSCRIBE ORDERS (OUTPATIENT)
Dept: LAB | Facility: CLINIC | Age: 61
End: 2021-03-01

## 2021-03-30 ENCOUNTER — TELEPHONE (OUTPATIENT)
Dept: LAB | Facility: HOSPITAL | Age: 61
End: 2021-03-30

## 2021-04-06 ENCOUNTER — TELEMEDICINE (OUTPATIENT)
Dept: FAMILY MEDICINE CLINIC | Facility: CLINIC | Age: 61
End: 2021-04-06

## 2021-04-06 VITALS
RESPIRATION RATE: 16 BRPM | DIASTOLIC BLOOD PRESSURE: 54 MMHG | TEMPERATURE: 97.5 F | HEART RATE: 90 BPM | OXYGEN SATURATION: 98 % | WEIGHT: 104 LBS | BODY MASS INDEX: 21.74 KG/M2 | SYSTOLIC BLOOD PRESSURE: 92 MMHG

## 2021-04-06 DIAGNOSIS — M81.0 OSTEOPOROSIS, UNSPECIFIED OSTEOPOROSIS TYPE, UNSPECIFIED PATHOLOGICAL FRACTURE PRESENCE: ICD-10-CM

## 2021-04-06 DIAGNOSIS — K21.00 GASTROESOPHAGEAL REFLUX DISEASE WITH ESOPHAGITIS WITHOUT HEMORRHAGE: ICD-10-CM

## 2021-04-06 DIAGNOSIS — E11.00 TYPE 2 DIABETES MELLITUS WITH HYPEROSMOLARITY WITHOUT COMA, WITHOUT LONG-TERM CURRENT USE OF INSULIN (HCC): Primary | ICD-10-CM

## 2021-04-06 DIAGNOSIS — R13.10 DYSPHAGIA, UNSPECIFIED TYPE: ICD-10-CM

## 2021-04-06 DIAGNOSIS — R91.8 ABNORMAL FINDING ON LUNG IMAGING: ICD-10-CM

## 2021-04-06 PROCEDURE — 1036F TOBACCO NON-USER: CPT | Performed by: FAMILY MEDICINE

## 2021-04-06 PROCEDURE — 99213 OFFICE O/P EST LOW 20 MIN: CPT | Performed by: FAMILY MEDICINE

## 2021-04-06 NOTE — ASSESSMENT & PLAN NOTE
-History of osteoporosis with a T-score of -3 1 in the right hip  on last DEXA scan in March 2020  -She is also receiving Prolia injections with last Prolia injection in December 4th 2020  -Next injection will be June 4th  -placed pre and post injection labs

## 2021-04-06 NOTE — ASSESSMENT & PLAN NOTE
CT chest/abdomen/pelvis from 9/2020 showed "Groundglass density left base with the areas of basilar consolidation at the right lung base may be acute or chronic  May represent acute infiltrate or nonresolved previously noted infiltrate    Follow-up chest CT suggested in 2 months"    Due to patient being fully vaccinated, will get repeat CT Chest to see if density has resolved since previous imaging

## 2021-04-06 NOTE — PROGRESS NOTES
Virtual Regular Visit      Assessment/Plan:    Problem List Items Addressed This Visit        Digestive    Gastroesophageal reflux disease     Stable  Continue Omeprazole 20mg BID         Dysphagia     -Stable, no recent episodes of aspiration  -Finished Speech therapy session in December  -Repeat video barium swallow test 10/29 showed moderate oral/ pharyngeal dsyphagia with penetration noted with NTL, improvement in swallowing of honey thick liquids by tsp   Aspiration risk noted with NTL by tsp and straw due to observed penetration to VC    - Recommendation is to continue the patient on pureed diet and honey thick liquid by tsp,  -continue meds crushed in puree  -EGD performed by GI on 10/26, negative for H  Pylori or intestional metaplasia, no evidence of eosinophilic esophagitis, no need for repeat EGD at this time  -Continue Omeprazole 20mg BID  - continue aspiration precautions            Endocrine    Type 2 diabetes mellitus, without long-term current use of insulin ([facility] [address] ) - Primary       Lab Results   Component Value Date    HGBA1C 7 2 (H) 02/17/2021     A1C is elevated compared to previous check in October which was 6 2  After seeing the elevated A1C, the facility has made changes to her diet such as decrease snacking and changing protein drink to low sugar option  Review of blood glucose readings for months of February and March  After discussion with [employer], will keep patient on current regimen and will recheck A1C in May to see how patient's A1C changes with dietary changes since February  -Continue Metformin 1000mg BID   -Recheck A1C in May, order placed  If elevated at repeat, consider starting secondary medication such as Jardiance 10mg q daily         Relevant Orders    HEMOGLOBIN A1C W/ EAG ESTIMATION       Musculoskeletal and Integument    Osteoporosis     -History of osteoporosis with a T-score of -3 1 in the right hip  on last DEXA scan in March 2020  -She is also receiving Prolia injections with last Prolia injection in December 4th 2020  -Next injection will be June 4th  -placed pre and post injection labs          Relevant Orders    Basic metabolic panel    Basic metabolic panel       Other    Abnormal finding on lung imaging     CT chest/abdomen/pelvis from 9/2020 showed "Groundglass density left base with the areas of basilar consolidation at the right lung base may be acute or chronic  May represent acute infiltrate or nonresolved previously noted infiltrate  Follow-up chest CT suggested in 2 months"    Due to patient being fully vaccinated, will get repeat CT Chest to see if density has resolved since previous imaging         Relevant Orders    CT chest wo contrast               Reason for visit is   Chief Complaint   Patient presents with    Virtual Regular Visit        Encounter provider Merrell Kayser, DO    Provider located at [address] [place] [id number] [facility]   [phone]      Recent Visits  No visits were found meeting these conditions  Showing recent visits within past 7 days and meeting all other requirements     Today's Visits  Date Type Provider Dept   04/06/21 Telemedicine Merrell Kayser, [facility] today's visits and meeting all other requirements     Future Appointments  No visits were found meeting these conditions  Showing future appointments within next 150 days and meeting all other requirements        The patient was identified by name and date of birth  Jose C Victor was informed that this is a telemedicine visit and that the visit is being conducted through [facility] and patient was informed that this is a secure, HIPAA-compliant platform  She agrees to proceed     My office door was closed  The patient was notified the following individuals were present in the room Medical Student: Tram Clark, MS-3    She acknowledged consent and understanding of privacy and security of the video platform  The patient has agreed to participate and understands they can discontinue the visit at any time  Patient is aware this is a billable service  Subjective  HPI:  Rigo Pedersen is a 64 y o  female with hx of Cerebral Palsy, Type 2DM, Hypothyroidism, s/p Hemicolectomy for volvulus and hx of recurrent UTIs who presents today for routine 3 months follow up  Patient is accompanied on the visit with Lisa from [facility]  Patient was last seen via telemedicine on 1/12/21  Today Yovnne Richards reports that Rhiannon Goldman is doing well overall  She is eating well overall but on occasion has her days where she does not eat all of her meals  They are walking her around the facility and Rhiannon Goldman is able to ambulate with one person assist however does has freezing periods and spasticity  No recent falls or injuries  Patient also received both of her COVID vaccines, Arias Peter, on 1/22/21 and 2/12/21 with no issues  In regards to her diabetes, reviewed her last A1C with Lisa as it was elevated at 7 2 on 2/17/21 from previously at 6 2 in October 2020  They are checking her blood glucose in the facility and in march her blood glucose was , in February it was   Since her elevated A1C they have made dietary changes such as decrease snacking, changing her protein drink to Premier Protein with contains 30g of protein and 1g of sugar and 3g of carbs, she gets 1/2 bottle per serving  With the dietary changes they hope to get her A1C under better control  Per Yvonne Richards, prefer to wait until repeat A1C before making decision to change medication regimen  Due for next Prolia shot June 4th  In regards to colonoscopy, due to patient requiring inpatient bowel regimen, GI would prefer to hold at this time                  Past Medical History:   Diagnosis Date    Adjustment disorder     Altered mental status 12/11/2015    Anemia     Bipolar 1 disorder ([facility] [address] [facility]     Cerebral palsy ([facility] [address] [facility]     Chronic hypernatremia 2/6/2016  Closed fracture of left hip ([facility] ) 1/19/2016    Closed left hip fracture (HCC)     no surgery    Constipation     Dehydration 2/20/2016    Diabetes mellitus ([place] [address] )     Disease of thyroid gland     Diverticulosis     Fracture of multiple ribs of right side     Hip fracture (HCC) 07/26/2015    left    Hyperlipidemia     Hypernatremia 12/28/2018    Hypotension     Impulse control disorder     Incontinence     Intellectual disability due to developmental disorder, unspecified     Microalbuminuria     Osteopathia     Osteoporosis     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HCC)     Thrombocytopenia ([facility] ) 7/31/2015       Past Surgical History:   Procedure Laterality Date    ABDOMINAL SURGERY      COLECTOMY MIN      re-anastomosis 7/22/16    COLONOSCOPY N/A 7/21/2016    Procedure: COLONOSCOPY;  Surgeon: Jaja Uriarte MD;  [facility]: [facility]; Service:     COLONOSCOPY N/A 1/31/2016    Procedure: COLONOSCOPY;  Surgeon: Jaja Uriarte MD;  [facility];  Service:     COLONOSCOPY N/A 7/25/2017    Procedure: COLONOSCOPY;  Surgeon: Jaja Uriarte MD;  [facility];   Service: Colorectal    COLOSTOMY      EXPLORATORY LAPAROTOMY W/ BOWEL RESECTION N/A 1/31/2016    Procedure: exploratory laparotomy, left sigmoidectomy, coloproctostomy, take down splenic flexure, loop colostomy;  Surgeon: Jaja Uriarte MD;  [facility];  Service:     [place] CLOSE ENTEROSTOMY N/A 7/22/2016    Procedure: SEGMENTAL COLECTOMY WITH COLOCOLOSTOMY;  Surgeon: Jaja Uriarte MD;  [facility];  Service: Colorectal    UPPER GASTROINTESTINAL ENDOSCOPY         Current Outpatient Medications   Medication Sig Dispense Refill    guaiFENesin (ROBITUSSIN) 100 MG/5ML oral liquid Take 200 mg by mouth every 8 (eight) hours as needed 2 tsp every 8 hours as needed for cough      acetaminophen (TYLENOL) 325 mg tablet Take 2 tablets (650 mg total) by mouth every 6 (six) hours as needed (pain, fever greater than 100 4) 240 tablet 0    ARIPiprazole (ABILIFY) 30 mg tablet Take 1 tablet (30 mg) by mouth daily at 8 pm  0    baclofen 10 mg tablet Take 1 tablet (10 mg total) by mouth 3 (three) times a day 90 tablet 11    benzonatate (TESSALON PERLES) 100 mg capsule Take 1 capsule (100 mg total) by mouth every 8 (eight) hours as needed for cough 30 capsule 0    calcium carbonate (OS-MORRIS) 600 MG tablet Take 1 tablet (600 mg total) by mouth 2 (two) times a day with meals 60 tablet 2    carbamide peroxide (DEBROX) 6 5 % otic solution Administer 2 drops into both ears daily as needed 2 drops in the affected ear prn x 5 days      carboxymethylcellulose (REFRESH TEARS) 0 5 % SOLN 1 drop 3 (three) times a day as needed for dry eyes      Cholecalciferol (Vitamin D) 50 MCG (2000 UT) tablet Take 1 tablet (2,000 Units total) by mouth daily 30 tablet 5    Citalopram Hydrobromide (CELEXA PO) Take 40 mg by mouth every morning        denosumab (PROLIA) 60 mg/mL Inject 1 mL (60 mg total) under the skin once for 1 dose 1 mL 1    docosanol (ABREVA) 10 % Apply topically 5 (five) times a day Apply and rub in 5x a day until healed as needed for lesion  0    ferrous sulfate 324 (65 Fe) mg Take 325 mg by mouth 2 (two) times a day   fexofenadine (ALLEGRA) 180 MG tablet Take 180 mg by mouth as needed        fluticasone (FLONASE) 50 mcg/act nasal spray 1 spray into each nostril daily as needed for rhinitis or allergies 1 Bottle 1    hydrocortisone 1 % cream Apply topically to affected area twice daily as needed for rash 30 g 0    levothyroxine (SYNTHROID) 25 mcg tablet Take 1 tablet (25 mcg) by mouth every morning  0    LORazepam (ATIVAN) 0 5 mg tablet Take 0 5 mg by mouth 2 (two) times a day          metFORMIN (GLUCOPHAGE) 1000 MG tablet Take 1 tablet (1,000 mg total) by mouth 2 (two) times a day with meals 60 tablet 2    neomycin-bacitracin-polymyxin b (NEOSPORIN) ointment Apply 1 application topically 2 (two) times a day as needed  nitrofurantoin (MACRODANTIN) 50 mg capsule Take 50 mg by mouth daily at bedtime      olopatadine HCl (PATADAY) 0 2 % opth drops Administer 1 drop to both eyes as needed      omeprazole (PriLOSEC) 20 mg delayed release capsule Take 1 capsule (20 mg total) by mouth 2 (two) times a day before meals 60 capsule 2    Oxybutynin Chloride (DITROPAN XL PO) Take 5 mg by mouth 3 (three) times a day       polyethylene glycol (GLYCOLAX) 17 GM/SCOOP powder DISSOLVE 17GM (1 CAPFUL) IN 8 OZ FLUIDS AND CONSUME BY MOUTH EVERY MORNING *HOLD 1 DAY FOR LOOSE STOOLS* (CONSTIPATION) 510 g 10    simvastatin (ZOCOR) 20 mg tablet Take 1 tablet (20 mg total) by mouth daily with dinner at 4 pm for hyperlipidemia      Starch, Thickening, POWD Take by mouth daily Use on food and liquid as directed 1020 g 3    traZODone (DESYREL) 100 mg tablet Take 1 tablet (100 mg total) by mouth daily at  8 pm for depression  0    valproic acid (DEPAKENE) 250 MG/5ML soln Take 10 mL (500 mg total) by mouth 2 (two) times a day 600 mL 1    Vitamins A & D (VITAMIN A & D) ointment Apply topically as needed for dry skin       No current facility-administered medications for this visit  No Known Allergies    Review of Systems   Constitutional: Negative for fever  HENT: Negative for congestion and rhinorrhea  Cardiovascular: Negative for leg swelling  Gastrointestinal: Negative for abdominal distention, constipation, diarrhea and vomiting  Genitourinary: Negative for difficulty urinating, hematuria and vaginal bleeding  Musculoskeletal: Positive for gait problem  Skin: Negative for rash and wound  Neurological: Negative for syncope  Psychiatric/Behavioral: Negative for agitation and sleep disturbance         Video Exam (performed with the assistance of nurse Lisa from [facility])    Vitals:    04/06/21 1115   BP: 92/54   Pulse: 90   Resp: 16   Temp: 97 5 °F (36 4 °C)   SpO2: 98%   Weight: 47 2 kg (104 lb)       Physical Exam  Vitals signs reviewed  Constitutional:       General: She is not in acute distress  Appearance: She is not ill-appearing or toxic-appearing  HENT:      Head: Normocephalic  Cardiovascular:      Rate and Rhythm: Normal rate and regular rhythm  Pulmonary:      Effort: Pulmonary effort is normal    Abdominal:      General: Abdomen is flat  Bowel sounds are normal  There is no distension  Musculoskeletal:      Right lower leg: No edema  Left lower leg: No edema  Skin:     General: Skin is warm and dry  Findings: No bruising, erythema, lesion or rash  Neurological:      Mental Status: She is alert  Gait: Gait abnormal           I spent 20 minutes directly with the patient during this visit      VIRTUAL VISIT [facility] acknowledges that she has consented to an online visit or consultation  She understands that the online visit is based solely on information provided by her, and that, in the absence of a face-to-face physical evaluation by the physician, the diagnosis she receives is both limited and provisional in terms of accuracy and completeness  This is not intended to replace a full medical face-to-face evaluation by the physician  Elena Cartagena understands and accepts these terms

## 2021-04-06 NOTE — ASSESSMENT & PLAN NOTE
Lab Results   Component Value Date    HGBA1C 7 2 (H) 02/17/2021     A1C is elevated compared to previous check in October which was 6 2  After seeing the elevated A1C, the facility has made changes to her diet such as decrease snacking and changing protein drink to low sugar option  Review of blood glucose readings for months of February and March  After discussion with Laura Shook, will keep patient on current regimen and will recheck A1C in May to see how patient's A1C changes with dietary changes since February  -Continue Metformin 1000mg BID   -Recheck A1C in May, order placed  If elevated at repeat, consider starting secondary medication such as Jardiance 10mg q daily

## 2021-04-06 NOTE — ASSESSMENT & PLAN NOTE
-Stable, no recent episodes of aspiration  -Finished Speech therapy session in December  -Repeat video barium swallow test 10/29 showed moderate oral/ pharyngeal dsyphagia with penetration noted with NTL, improvement in swallowing of honey thick liquids by tsp   Aspiration risk noted with NTL by tsp and straw due to observed penetration to VC    - Recommendation is to continue the patient on pureed diet and honey thick liquid by tsp,  -continue meds crushed in puree  -EGD performed by GI on 10/26, negative for H  Pylori or intestional metaplasia, no evidence of eosinophilic esophagitis, no need for repeat EGD at this time  -Continue Omeprazole 20mg BID  - continue aspiration precautions

## 2021-04-06 NOTE — LETTER
April 6, 2021     [facility]    Patient: Erika French   YOB: 1960   Date of Visit: 4/6/2021       Dear Monika Bullard: It was great seeing you and Joana Ackerman today  Below are my notes for today's visit  If you have questions, please do not hesitate to call me  Sincerely,        Angel Terry DO        CC: No Recipients  Angel Terry DO  4/6/2021 12:14 PM  Sign when Signing Visit    Virtual Regular Visit      Assessment/Plan:    Problem List Items Addressed This Visit        Digestive    Gastroesophageal reflux disease     Stable  Continue Omeprazole 20mg BID         Dysphagia     -Stable, no recent episodes of aspiration  -Finished Speech therapy session in December  -Repeat video barium swallow test 10/29 showed moderate oral/ pharyngeal dsyphagia with penetration noted with NTL, improvement in swallowing of honey thick liquids by tsp   Aspiration risk noted with NTL by tsp and straw due to observed penetration to VC    - Recommendation is to continue the patient on pureed diet and honey thick liquid by tsp,  -continue meds crushed in puree  -EGD performed by GI on 10/26, negative for H  Pylori or intestional metaplasia, no evidence of eosinophilic esophagitis, no need for repeat EGD at this time  -Continue Omeprazole 20mg BID  - continue aspiration precautions            Endocrine    Type 2 diabetes mellitus, without long-term current use of insulin ([facility] [address] ) - Primary       Lab Results   Component Value Date    HGBA1C 7 2 (H) 02/17/2021     A1C is elevated compared to previous check in October which was 6 2  After seeing the elevated A1C, the facility has made changes to her diet such as decrease snacking and changing protein drink to low sugar option  Review of blood glucose readings for months of February and March  After discussion with Monika Bullard, will keep patient on current regimen and will recheck A1C in May to see how patient's A1C changes with dietary changes since February  -Continue Metformin 1000mg BID   -Recheck A1C in May, order placed  If elevated at repeat, consider starting secondary medication such as Jardiance 10mg q daily         Relevant Orders    HEMOGLOBIN A1C W/ EAG ESTIMATION       Musculoskeletal and Integument    Osteoporosis     -History of osteoporosis with a T-score of -3 1 in the right hip  on last DEXA scan in March 2020  -She is also receiving Prolia injections with last Prolia injection in December 4th 2020  -Next injection will be June 4th  -placed pre and post injection labs          Relevant Orders    Basic metabolic panel    Basic metabolic panel       Other    Abnormal finding on lung imaging     CT chest/abdomen/pelvis from 9/2020 showed "Groundglass density left base with the areas of basilar consolidation at the right lung base may be acute or chronic  May represent acute infiltrate or nonresolved previously noted infiltrate  Follow-up chest CT suggested in 2 months"    Due to patient being fully vaccinated, will get repeat CT Chest to see if density has resolved since previous imaging         Relevant Orders    CT chest wo contrast               Reason for visit is   Chief Complaint   Patient presents with    Virtual Regular Visit        Encounter provider Eladio Moser DO    Provider located at [address] [place] [address]   [phone]      Recent Visits  No visits were found meeting these conditions  Showing recent visits within past 7 days and meeting all other requirements     Today's Visits  Date Type Provider Dept   04/06/21 Telemedicine Eladio Moser, [facility] today's visits and meeting all other requirements     Future Appointments  No visits were found meeting these conditions  Showing future appointments within next 150 days and meeting all other requirements        The patient was identified by name and date of birth   Artur Montana was informed that this is a telemedicine visit and that the visit is being conducted through [employer] and patient was informed that this is a secure, HIPAA-compliant platform  She agrees to proceed     My office door was closed  The patient was notified the following individuals were present in the room Medical Student: Shazia Curiel, MS-3  She acknowledged consent and understanding of privacy and security of the video platform  The patient has agreed to participate and understands they can discontinue the visit at any time  Patient is aware this is a billable service  Subjective  HPI:  Aaliyah Vaughn is a 64 y o  female with hx of Cerebral Palsy, Type 2DM, Hypothyroidism, s/p Hemicolectomy for volvulus and hx of recurrent UTIs who presents today for routine 3 months follow up  Patient is accompanied on the visit with Lias from [facility]  Patient was last seen via telemedicine on 1/12/21  Today Karin Benítez reports that Shayne Davies is doing well overall  She is eating well overall but on occasion has her days where she does not eat all of her meals  They are walking her around the facility and Shayne Davies is able to ambulate with one person assist however does has freezing periods and spasticity  No recent falls or injuries  Patient also received both of her COVID vaccines, Arias Peter, on 1/22/21 and 2/12/21 with no issues  In regards to her diabetes, reviewed her last A1C with Lisa as it was elevated at 7 2 on 2/17/21 from previously at 6 2 in October 2020  They are checking her blood glucose in the facility and in march her blood glucose was , in February it was   Since her elevated A1C they have made dietary changes such as decrease snacking, changing her protein drink to Premier Protein with contains 30g of protein and 1g of sugar and 3g of carbs, she gets 1/2 bottle per serving  With the dietary changes they hope to get her A1C under better control   Per Karin Benítez, prefer to wait until repeat A1C before making decision to change medication regimen  Due for next Prolia shot June 4th  In regards to colonoscopy, due to patient requiring inpatient bowel regimen, GI would prefer to hold at this time  Past Medical History:   Diagnosis Date    Adjustment disorder     Altered mental status 12/11/2015    Anemia     Bipolar 1 disorder (HCC)     Cerebral palsy ([facility] )     Chronic hypernatremia 2/6/2016    Closed fracture of left hip ([facility] ) 1/19/2016    Closed left hip fracture (HCC)     no surgery    Constipation     Dehydration 2/20/2016    Diabetes mellitus ([facility] )     Disease of thyroid gland     Diverticulosis     Fracture of multiple ribs of right side     Hip fracture ([facility] ) 07/26/2015    left    Hyperlipidemia     Hypernatremia 12/28/2018    Hypotension     Impulse control disorder     Incontinence     Intellectual disability due to developmental disorder, unspecified     Microalbuminuria     Osteopathia     Osteoporosis     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[facility] )     Thrombocytopenia ([place] [id number] ) 7/31/2015       Past Surgical History:   Procedure Laterality Date    ABDOMINAL SURGERY      COLECTOMY MIN      re-anastomosis 7/22/16    COLONOSCOPY N/A 7/21/2016    Procedure: COLONOSCOPY;  Surgeon: Radames Bustillos MD;  Location: [facility]; Service:     COLONOSCOPY N/A 1/31/2016    Procedure: COLONOSCOPY;  Surgeon: Radames Bustillos MD;  [facility];  Service:     COLONOSCOPY N/A 7/25/2017    Procedure: COLONOSCOPY;  Surgeon: Radames Bustillos MD;  [facility];   Service: Colorectal    COLOSTOMY      EXPLORATORY LAPAROTOMY W/ BOWEL RESECTION N/A 1/31/2016    Procedure: exploratory laparotomy, left sigmoidectomy, coloproctostomy, take down splenic flexure, loop colostomy;  Surgeon: Radames Bustillos MD;  [facility];  Service:     UT CLOSE ENTEROSTOMY N/A 7/22/2016    Procedure: SEGMENTAL COLECTOMY WITH COLOCOLOSTOMY;  Surgeon: Radames Bustillos MD;  [facility]; Service: Colorectal    UPPER GASTROINTESTINAL ENDOSCOPY         Current Outpatient Medications   Medication Sig Dispense Refill    guaiFENesin (ROBITUSSIN) 100 MG/5ML oral liquid Take 200 mg by mouth every 8 (eight) hours as needed 2 tsp every 8 hours as needed for cough      acetaminophen (TYLENOL) 325 mg tablet Take 2 tablets (650 mg total) by mouth every 6 (six) hours as needed (pain, fever greater than 100 4) 240 tablet 0    ARIPiprazole (ABILIFY) 30 mg tablet Take 1 tablet (30 mg) by mouth daily at 8 pm  0    baclofen 10 mg tablet Take 1 tablet (10 mg total) by mouth 3 (three) times a day 90 tablet 11    benzonatate (TESSALON PERLES) 100 mg capsule Take 1 capsule (100 mg total) by mouth every 8 (eight) hours as needed for cough 30 capsule 0    calcium carbonate (OS-MORRIS) 600 MG tablet Take 1 tablet (600 mg total) by mouth 2 (two) times a day with meals 60 tablet 2    carbamide peroxide (DEBROX) 6 5 % otic solution Administer 2 drops into both ears daily as needed 2 drops in the affected ear prn x 5 days      carboxymethylcellulose (REFRESH TEARS) 0 5 % SOLN 1 drop 3 (three) times a day as needed for dry eyes      Cholecalciferol (Vitamin D) 50 MCG (2000 UT) tablet Take 1 tablet (2,000 Units total) by mouth daily 30 tablet 5    Citalopram Hydrobromide (CELEXA PO) Take 40 mg by mouth every morning        denosumab (PROLIA) 60 mg/mL Inject 1 mL (60 mg total) under the skin once for 1 dose 1 mL 1    docosanol (ABREVA) 10 % Apply topically 5 (five) times a day Apply and rub in 5x a day until healed as needed for lesion  0    ferrous sulfate 324 (65 Fe) mg Take 325 mg by mouth 2 (two) times a day        fexofenadine (ALLEGRA) 180 MG tablet Take 180 mg by mouth as needed        fluticasone (FLONASE) 50 mcg/act nasal spray 1 spray into each nostril daily as needed for rhinitis or allergies 1 Bottle 1    hydrocortisone 1 % cream Apply topically to affected area twice daily as needed for rash 30 g 0    levothyroxine (SYNTHROID) 25 mcg tablet Take 1 tablet (25 mcg) by mouth every morning  0    LORazepam (ATIVAN) 0 5 mg tablet Take 0 5 mg by mouth 2 (two) times a day   metFORMIN (GLUCOPHAGE) 1000 MG tablet Take 1 tablet (1,000 mg total) by mouth 2 (two) times a day with meals 60 tablet 2    neomycin-bacitracin-polymyxin b (NEOSPORIN) ointment Apply 1 application topically 2 (two) times a day as needed      nitrofurantoin (MACRODANTIN) 50 mg capsule Take 50 mg by mouth daily at bedtime      olopatadine HCl (PATADAY) 0 2 % opth drops Administer 1 drop to both eyes as needed      omeprazole (PriLOSEC) 20 mg delayed release capsule Take 1 capsule (20 mg total) by mouth 2 (two) times a day before meals 60 capsule 2    Oxybutynin Chloride (DITROPAN XL PO) Take 5 mg by mouth 3 (three) times a day       polyethylene glycol (GLYCOLAX) 17 GM/SCOOP powder DISSOLVE 17GM (1 CAPFUL) IN 8 OZ FLUIDS AND CONSUME BY MOUTH EVERY MORNING *HOLD 1 DAY FOR LOOSE STOOLS* (CONSTIPATION) 510 g 10    simvastatin (ZOCOR) 20 mg tablet Take 1 tablet (20 mg total) by mouth daily with dinner at 4 pm for hyperlipidemia      Starch, Thickening, POWD Take by mouth daily Use on food and liquid as directed 1020 g 3    traZODone (DESYREL) 100 mg tablet Take 1 tablet (100 mg total) by mouth daily at  8 pm for depression  0    valproic acid (DEPAKENE) 250 MG/5ML soln Take 10 mL (500 mg total) by mouth 2 (two) times a day 600 mL 1    Vitamins A & D (VITAMIN A & D) ointment Apply topically as needed for dry skin       No current facility-administered medications for this visit  No Known Allergies    Review of Systems   Constitutional: Negative for fever  HENT: Negative for congestion and rhinorrhea  Cardiovascular: Negative for leg swelling  Gastrointestinal: Negative for abdominal distention, constipation, diarrhea and vomiting  Genitourinary: Negative for difficulty urinating, hematuria and vaginal bleeding  Musculoskeletal: Positive for gait problem  Skin: Negative for rash and wound  Neurological: Negative for syncope  Psychiatric/Behavioral: Negative for agitation and sleep disturbance  Video Exam (performed with the assistance of nurse Lisa from [facility])    Vitals:    04/06/21 1115   BP: 92/54   Pulse: 90   Resp: 16   Temp: 97 5 °F (36 4 °C)   SpO2: 98%   Weight: 47 2 kg (104 lb)       Physical Exam  Vitals signs reviewed  Constitutional:       General: She is not in acute distress  Appearance: She is not ill-appearing or toxic-appearing  HENT:      Head: Normocephalic  Cardiovascular:      Rate and Rhythm: Normal rate and regular rhythm  Pulmonary:      Effort: Pulmonary effort is normal    Abdominal:      General: Abdomen is flat  Bowel sounds are normal  There is no distension  Musculoskeletal:      Right lower leg: No edema  Left lower leg: No edema  Skin:     General: Skin is warm and dry  Findings: No bruising, erythema, lesion or rash  Neurological:      Mental Status: She is alert  Gait: Gait abnormal           I spent 20 minutes directly with the patient during this visit      VIRTUAL VISIT Haresh acknowledges that she has consented to an online visit or consultation  She understands that the online visit is based solely on information provided by her, and that, in the absence of a face-to-face physical evaluation by the physician, the diagnosis she receives is both limited and provisional in terms of accuracy and completeness  This is not intended to replace a full medical face-to-face evaluation by the physician  Aaliyah Vaguhn understands and accepts these terms

## 2021-04-08 ENCOUNTER — APPOINTMENT (OUTPATIENT)
Dept: LAB | Facility: HOSPITAL | Age: 61
End: 2021-04-08
Payer: COMMERCIAL

## 2021-04-08 LAB
ALBUMIN SERPL BCP-MCNC: 2.9 G/DL (ref 3.5–5)
ALP SERPL-CCNC: 45 U/L (ref 46–116)
ALT SERPL W P-5'-P-CCNC: 9 U/L (ref 12–78)
ANION GAP SERPL CALCULATED.3IONS-SCNC: 4 MMOL/L (ref 4–13)
AST SERPL W P-5'-P-CCNC: 9 U/L (ref 5–45)
BILIRUB SERPL-MCNC: 0.2 MG/DL (ref 0.2–1)
BUN SERPL-MCNC: 14 MG/DL (ref 5–25)
CALCIUM ALBUM COR SERPL-MCNC: 9.9 MG/DL (ref 8.3–10.1)
CALCIUM SERPL-MCNC: 9 MG/DL (ref 8.3–10.1)
CHLORIDE SERPL-SCNC: 103 MMOL/L (ref 100–108)
CHOLEST SERPL-MCNC: 157 MG/DL (ref 50–200)
CK SERPL-CCNC: 27 U/L (ref 26–192)
CO2 SERPL-SCNC: 35 MMOL/L (ref 21–32)
CREAT SERPL-MCNC: 0.92 MG/DL (ref 0.6–1.3)
GFR SERPL CREATININE-BSD FRML MDRD: 69 ML/MIN/1.73SQ M
GLUCOSE P FAST SERPL-MCNC: 138 MG/DL (ref 65–99)
HDLC SERPL-MCNC: 47 MG/DL
LDLC SERPL CALC-MCNC: 84 MG/DL (ref 0–100)
NONHDLC SERPL-MCNC: 110 MG/DL
POTASSIUM SERPL-SCNC: 3.9 MMOL/L (ref 3.5–5.3)
PROT SERPL-MCNC: 6.6 G/DL (ref 6.4–8.2)
SODIUM SERPL-SCNC: 142 MMOL/L (ref 136–145)
TRIGL SERPL-MCNC: 132 MG/DL

## 2021-04-08 PROCEDURE — 36415 COLL VENOUS BLD VENIPUNCTURE: CPT

## 2021-04-08 PROCEDURE — 80061 LIPID PANEL: CPT

## 2021-04-08 PROCEDURE — 80053 COMPREHEN METABOLIC PANEL: CPT

## 2021-04-08 PROCEDURE — 82550 ASSAY OF CK (CPK): CPT

## 2021-05-04 ENCOUNTER — TELEPHONE (OUTPATIENT)
Dept: FAMILY MEDICINE CLINIC | Facility: CLINIC | Age: 61
End: 2021-05-04

## 2021-05-04 NOTE — TELEPHONE ENCOUNTER
Lisa from [facility] dropped off paperwork for Dr Holman Forward to fill out, placed in her folder in triage   Can call Stacey Cast when complete

## 2021-05-06 ENCOUNTER — HOSPITAL ENCOUNTER (OUTPATIENT)
Dept: RADIOLOGY | Facility: MEDICAL CENTER | Age: 61
Discharge: HOME | End: 2021-05-06
Payer: COMMERCIAL

## 2021-05-06 DIAGNOSIS — R91.8 ABNORMAL FINDING ON LUNG IMAGING: ICD-10-CM

## 2021-05-06 PROCEDURE — 71250 CT THORAX DX C-: CPT

## 2021-05-06 PROCEDURE — G1004 CDSM NDSC: HCPCS

## 2021-05-18 ENCOUNTER — VBI (OUTPATIENT)
Dept: ADMINISTRATIVE | Facility: OTHER | Age: 61
End: 2021-05-18

## 2021-05-19 ENCOUNTER — TELEPHONE (OUTPATIENT)
Dept: LAB | Facility: HOSPITAL | Age: 61
End: 2021-05-19

## 2021-05-26 ENCOUNTER — APPOINTMENT (OUTPATIENT)
Dept: LAB | Facility: MEDICAL CENTER | Age: 61
End: 2021-05-26
Payer: COMMERCIAL

## 2021-05-26 DIAGNOSIS — E11.00 TYPE 2 DIABETES MELLITUS WITH HYPEROSMOLARITY WITHOUT COMA, WITHOUT LONG-TERM CURRENT USE OF INSULIN (HCC): ICD-10-CM

## 2021-05-26 DIAGNOSIS — M81.0 OSTEOPOROSIS, UNSPECIFIED OSTEOPOROSIS TYPE, UNSPECIFIED PATHOLOGICAL FRACTURE PRESENCE: ICD-10-CM

## 2021-05-26 LAB
ANION GAP SERPL CALCULATED.3IONS-SCNC: 5 MMOL/L (ref 4–13)
BUN SERPL-MCNC: 12 MG/DL (ref 5–25)
CALCIUM SERPL-MCNC: 10 MG/DL (ref 8.3–10.1)
CHLORIDE SERPL-SCNC: 103 MMOL/L (ref 100–108)
CO2 SERPL-SCNC: 35 MMOL/L (ref 21–32)
CREAT SERPL-MCNC: 0.78 MG/DL (ref 0.6–1.3)
EST. AVERAGE GLUCOSE BLD GHB EST-MCNC: 177 MG/DL
GFR SERPL CREATININE-BSD FRML MDRD: 84 ML/MIN/1.73SQ M
GLUCOSE P FAST SERPL-MCNC: 189 MG/DL (ref 65–99)
HBA1C MFR BLD: 7.8 %
POTASSIUM SERPL-SCNC: 3.6 MMOL/L (ref 3.5–5.3)
SODIUM SERPL-SCNC: 143 MMOL/L (ref 136–145)

## 2021-05-26 PROCEDURE — 3051F HG A1C>EQUAL 7.0%<8.0%: CPT | Performed by: FAMILY MEDICINE

## 2021-05-26 PROCEDURE — 83036 HEMOGLOBIN GLYCOSYLATED A1C: CPT

## 2021-05-26 PROCEDURE — 36415 COLL VENOUS BLD VENIPUNCTURE: CPT

## 2021-05-26 PROCEDURE — 80048 BASIC METABOLIC PNL TOTAL CA: CPT

## 2021-06-01 ENCOUNTER — TELEPHONE (OUTPATIENT)
Dept: LAB | Facility: HOSPITAL | Age: 61
End: 2021-06-01

## 2021-06-02 ENCOUNTER — OFFICE VISIT (OUTPATIENT)
Dept: FAMILY MEDICINE CLINIC | Facility: CLINIC | Age: 61
End: 2021-06-02

## 2021-06-02 VITALS
BODY MASS INDEX: 20.78 KG/M2 | TEMPERATURE: 98.4 F | RESPIRATION RATE: 18 BRPM | DIASTOLIC BLOOD PRESSURE: 70 MMHG | WEIGHT: 99 LBS | HEIGHT: 58 IN | SYSTOLIC BLOOD PRESSURE: 108 MMHG | HEART RATE: 86 BPM

## 2021-06-02 DIAGNOSIS — B02.9 HERPES ZOSTER WITHOUT COMPLICATION: Primary | ICD-10-CM

## 2021-06-02 PROCEDURE — 3008F BODY MASS INDEX DOCD: CPT | Performed by: FAMILY MEDICINE

## 2021-06-02 PROCEDURE — 99214 OFFICE O/P EST MOD 30 MIN: CPT | Performed by: FAMILY MEDICINE

## 2021-06-02 RX ORDER — VALACYCLOVIR HYDROCHLORIDE 1 G/1
1000 TABLET, FILM COATED ORAL 3 TIMES DAILY
Qty: 21 TABLET | Refills: 0 | Status: SHIPPED | OUTPATIENT
Start: 2021-06-02 | End: 2021-06-09

## 2021-06-02 NOTE — PROGRESS NOTES
OFFICE VISIT NOTE - [address] 64 y o  (Yaritza Servin) female MRN: [id number]  Unit/Bed#:  Encounter: [id number]      Assessment/Plan:    Herpes zoster without complication  Patient here for rash noted yesterday morning by home nurse  Rash is on the lateral aspect of right knee/distal thigh, linear, vesicular and erythematous  Not crusty or weepy wt no drainage (Picture in chart)  Does not appear to be painful or itchy   This appears to be classic appearance of herpes zoster  Nurse is unaware of childhood chickenpox hx, also no hx of shingrix vaccination  Will treat as such with Valtrex 1g TID x 7 days  And follow up in a week  Diagnoses and all orders for this visit:    Herpes zoster without complication  -     valACYclovir (VALTREX) 1,000 mg tablet; Take 1 tablet (1,000 mg total) by mouth 3 (three) times a day for 7 days        Subjective:      Patient ID: Maria Victoria Ulloa is a 64 y o  female  HPI  Adina Bonilla is a 65 yo female group home resident with history of cerebral palsy, bipolar disorder, hypothyroidism, dysphagia, DM type 2, GERD who is here with concern of a rash that was noted yesterday morning on the lateral aspect of the right knee/distal thighby caregiver nurse  Rash is not painful, itchy or draining  This has not happened before  It appears linear and reddish  Otherwise, home vitals have been completely normal  Caregiver nurse denies any abnormal symptoms including fever, chills, lethargy, fatigue, changes in sleep or appetite  The following portions of the patient's history were reviewed and updated as appropriate: allergies, current medications, past family history, past medical history, past social history, past surgical history and problem list     Review of Systems   Constitutional: Negative for chills, fatigue and fever  Respiratory: Negative for cough, shortness of breath and wheezing      Cardiovascular: Negative for chest pain, palpitations and leg swelling  Gastrointestinal: Negative for abdominal pain, diarrhea, nausea and vomiting  Genitourinary: Negative for dysuria  Musculoskeletal: Negative for arthralgias and joint swelling  Skin: Negative for rash  Neurological: Negative for dizziness and headaches  Psychiatric/Behavioral: Negative for sleep disturbance  Objective:    /70 (BP Location: Left arm, Patient Position: Sitting, Cuff Size: Standard)   Pulse 86   Temp 98 4 °F (36 9 °C) (Temporal)   Resp 18   Ht 4' 10" (1 473 m)   Wt 44 9 kg (99 lb)   LMP 11/29/2009 (Exact Date)   BMI 20 69 kg/m²        Physical Exam  Vitals signs reviewed  Constitutional:       General: She is not in acute distress  Appearance: She is normal weight  She is not ill-appearing  HENT:      Head: Normocephalic and atraumatic  Right Ear: External ear normal       Left Ear: External ear normal       Nose: Nose normal  No congestion or rhinorrhea  Eyes:      Conjunctiva/sclera: Conjunctivae normal    Cardiovascular:      Rate and Rhythm: Normal rate and regular rhythm  Heart sounds: Normal heart sounds  Pulmonary:      Effort: No respiratory distress  Breath sounds: Normal breath sounds  No wheezing  Abdominal:      General: Bowel sounds are normal  There is no distension  Palpations: Abdomen is soft  There is no mass  Tenderness: There is no abdominal tenderness  Musculoskeletal: Normal range of motion  General: No swelling, tenderness or deformity  Right lower leg: No edema  Left lower leg: No edema  Skin:     General: Skin is warm  Findings: Rash present  Rash is vesicular  Rash is not crusting, papular, pustular or scaling  Comments: Linear, erythematous, vesicular rash extending abt 6cm in length on the lat surface of knee/distal thigh  nontender and nonpruritic   Neurological:      Mental Status: She is alert           Jimmie Gayle MD  PGY-1 Family Medicine  06/02/21

## 2021-06-02 NOTE — ASSESSMENT & PLAN NOTE
Patient here for rash noted yesterday morning by home nurse  Rash is on the lateral aspect of right knee/distal thigh, linear, vesicular and erythematous  Not crusty or weepy wt no drainage (Picture in chart)  Does not appear to be painful or itchy   This appears to be classic appearance of herpes zoster  Nurse is unaware of childhood chickenpox hx, also no hx of shingrix vaccination  Will treat as such with Valtrex 1g TID x 7 days  And follow up in a week

## 2021-06-02 NOTE — PATIENT INSTRUCTIONS
Shingles   WHAT YOU NEED TO KNOW:   Shingles is a painful rash  Shingles is caused by the same virus that causes chickenpox (varicella-zoster)  After you get chickenpox, the virus stays in your body for several years without causing any symptoms  Shingles occurs when the virus becomes active again  The active virus travels along a nerve to your skin and causes a rash  DISCHARGE INSTRUCTIONS:   Call your local emergency number (911 in the [facility]) if:   · You have trouble moving your arms, legs, or face  · You become confused, or have difficulty speaking  · You have a seizure  Return to the emergency department if:   · You have weakness in an arm or leg  · You have dizziness, a severe headache, or hearing or vision loss  · You have painful, red, warm skin around the blisters, or the blisters drain pus  · Your neck is stiff or you have trouble moving it  Call your doctor if:   · You feel weak or have a headache  · You have a cough, chills, or a fever  · You have abdominal pain or nausea, or you are vomiting  · Your rash becomes more itchy or painful  · Your rash spreads to other parts of your body  · Your pain worsens and does not go away even after you take medicine  · You have questions or concerns about your condition or care  Medicines: You may need any of the following:  · Antiviral medicine  helps decrease symptoms and healing time  They may also decrease your risk of developing nerve pain  You will need to start taking them within 3 days of the start of symptoms to prevent nerve pain  · Prescription pain medicine  may be given  Ask your healthcare provider how to take this medicine safely  Some prescription pain medicines contain acetaminophen  Do not take other medicines that contain acetaminophen without talking to your healthcare provider  Too much acetaminophen may cause liver damage  Prescription pain medicine may cause constipation   Ask your healthcare provider how to prevent or treat constipation  · Acetaminophen  decreases pain and fever  It is available without a doctor's order  Ask how much to take and how often to take it  Follow directions  Read the labels of all other medicines you are using to see if they also contain acetaminophen, or ask your doctor or pharmacist  Acetaminophen can cause liver damage if not taken correctly  Do not use more than 4 grams (4,000 milligrams) total of acetaminophen in one day  · NSAIDs , such as ibuprofen, help decrease swelling, pain, and fever  This medicine is available with or without a doctor's order  NSAIDs can cause stomach bleeding or kidney problems in certain people  If you take blood thinner medicine, always ask if NSAIDs are safe for you  Always read the medicine label and follow directions  Do not give these medicines to children under 10months of age without direction from your child's healthcare provider  · Topical anesthetics  are used to numb the skin and decrease pain  They can be a cream, gel, spray, or patch  · Anticonvulsants  decrease nerve pain and may help you sleep at night  · Antidepressants  may be used to decrease nerve pain  · Take your medicine as directed  Contact your healthcare provider if you think your medicine is not helping or if you have side effects  Tell him of her if you are allergic to any medicine  Keep a list of the medicines, vitamins, and herbs you take  Include the amounts, and when and why you take them  Bring the list or the pill bottles to follow-up visits  Carry your medicine list with you in case of an emergency  Self-care:  Keep your rash clean and dry  Cover your rash with a bandage or clothing  Do not use bandages that stick to your skin  The sticky part may irritate your skin and make your rash last longer  Prevent the spread of germs:       · Wash your hands often  Wash your hands several times each day   Wash after you use the bathroom, change a child's diaper, and before you prepare or eat food  Use soap and water every time  Rub your soapy hands together, lacing your fingers  Wash the front and back of your hands, and in between your fingers  Use the fingers of one hand to scrub under the fingernails of the other hand  Wash for at least 20 seconds  Rinse with warm, running water for several seconds  Then dry your hands with a clean towel or paper towel  Use hand  that contains alcohol if soap and water are not available  Do not touch your eyes, nose, or mouth without washing your hands first          · Cover a sneeze or cough  Use a tissue that covers your mouth and nose  Throw the tissue away in a trash can right away  Use the bend of your arm if a tissue is not available  Wash your hands well with soap and water or use a hand   · Stay away from others while you are sick  Avoid crowds as much as possible  · Ask about vaccines you may need  Talk to your healthcare provider about your vaccine history  He or she will tell you which vaccines you need, and when to get them  Prevent shingles or another shingles outbreak:  A vaccine may be given to help prevent shingles  You can get the vaccine even if you already had shingles  The vaccine can help prevent a future outbreak  If you do get shingles again, the vaccine can keep it from becoming severe  The vaccine comes in 2 forms  Your healthcare provider will tell you which form is right for you  The decision is based on your age and any medical conditions you have  A 2-dose vaccine is usually given to adults 48 years or older  A 1-dose vaccine may be given to adults 61 years or older    For more information:   · Centers for Disease Control and Prevention  [address] , [address]  Phone: 8- 126 - 5227771  Phone: 2- 372 - 6499880  Web Address: [employer] com     Follow up with your doctor as directed:  Write down your questions so you remember to ask them during your visits  © Copyright [address] [place] [address] Information is for End User's use only and may not be sold, redistributed or otherwise used for commercial purposes  All illustrations and images included in CareNotes® are the copyrighted property of A D A M [employer]  or Janet Quijano  The above information is an  only  It is not intended as medical advice for individual conditions or treatments  Talk to your doctor, nurse or pharmacist before following any medical regimen to see if it is safe and effective for you

## 2021-06-08 ENCOUNTER — TELEPHONE (OUTPATIENT)
Dept: FAMILY MEDICINE CLINIC | Facility: CLINIC | Age: 61
End: 2021-06-08

## 2021-06-09 ENCOUNTER — OFFICE VISIT (OUTPATIENT)
Dept: FAMILY MEDICINE CLINIC | Facility: CLINIC | Age: 61
End: 2021-06-09

## 2021-06-09 VITALS
OXYGEN SATURATION: 97 % | RESPIRATION RATE: 18 BRPM | HEART RATE: 76 BPM | TEMPERATURE: 98.1 F | SYSTOLIC BLOOD PRESSURE: 90 MMHG | DIASTOLIC BLOOD PRESSURE: 66 MMHG

## 2021-06-09 DIAGNOSIS — B02.9 HERPES ZOSTER WITHOUT COMPLICATION: Primary | ICD-10-CM

## 2021-06-09 PROCEDURE — 99213 OFFICE O/P EST LOW 20 MIN: CPT | Performed by: FAMILY MEDICINE

## 2021-06-09 PROCEDURE — 1036F TOBACCO NON-USER: CPT | Performed by: FAMILY MEDICINE

## 2021-06-09 NOTE — PROGRESS NOTES
OFFICE VISIT NOTE - [address] 63 y o  (Valentino Pacific) female MRN: [id number]  Unit/Bed#:  Encounter: [id number]      Assessment/Plan:    Herpes zoster without complication  Patient is here for 1 week follow up of rash noted last week to be classic for shingles  Was prescribed 1 week course of Valtrex 1g TID, last day is tomorrow  Rash appears much improved - no blisters or crusting  Individual blisters have coalesced to form single linear erythematous dry patch (picture is in chart)  No other symptoms or concerns  will f/u on 06/25 with Dr José Luis Faulkner as scheduled     Diagnoses and all orders for this visit:    Herpes zoster without complication        Subjective:      Patient ID: Carri Meade is a 63 y o  female  HPI     Angella Smith is a 65 yo female group home resident here for f/u of shingles rash noted last week on the lateral aspect of the right knee/distal thigh by caregiver nurse  Rash is not painful, itchy or draining  Patient was prescribed a 1-week course of Valtrex 1g TID with tomorrow being the last day  Rash has improved in appearance  Vesicles /blisters have coalesced to form a dry linear patch, non crusty and nontender  Patient remains asymptomatic, afebrile and at baseline  Caregiver nurse denies any abnormal symptoms including fever, chills, lethargy, fatigue, changes in sleep or appetite  The following portions of the patient's history were reviewed and updated as appropriate: allergies, current medications, past family history, past medical history, past social history, past surgical history and problem list     Review of Systems   Constitutional: Negative for chills, fatigue and fever  HENT: Negative for congestion and sore throat  Eyes: Negative for visual disturbance  Respiratory: Negative for cough, shortness of breath and wheezing  Cardiovascular: Negative for chest pain, palpitations and leg swelling  Gastrointestinal: Negative for abdominal pain, diarrhea, nausea and vomiting  Genitourinary: Negative for dysuria  Skin: Positive for rash  Neurological: Negative for headaches  Psychiatric/Behavioral: Negative for sleep disturbance  Objective:    BP 90/66   Pulse 76   Temp 98 1 °F (36 7 °C) (Tympanic)   Resp 18   LMP 11/29/2009 (Exact Date)   SpO2 97%      Physical Exam  Constitutional:       General: She is not in acute distress  Appearance: She is not ill-appearing  HENT:      Head: Normocephalic and atraumatic  Right Ear: External ear normal       Left Ear: External ear normal       Nose: Nose normal  No congestion or rhinorrhea  Eyes:      Conjunctiva/sclera: Conjunctivae normal    Cardiovascular:      Rate and Rhythm: Normal rate and regular rhythm  Heart sounds: Normal heart sounds  Pulmonary:      Effort: No respiratory distress  Breath sounds: Normal breath sounds  No wheezing  Abdominal:      General: Bowel sounds are normal  There is no distension  Palpations: Abdomen is soft  There is no mass  Tenderness: There is no abdominal tenderness  Musculoskeletal: Normal range of motion  General: No swelling, tenderness or deformity  Right lower leg: No edema  Left lower leg: No edema  Skin:     General: Skin is warm  Findings: Rash present  Rash is not crusting, papular, pustular or vesicular  Comments: Dry, linear, erythematous, Nontender, nonpruritic rash on lateral aspect of distal thigh/knee  No vesicles/blisters noted (picture in chart)   Neurological:      Mental Status: She is alert and oriented to person, place, and time     Psychiatric:         Behavior: Behavior normal          Ovidio Cornell MD  PGY-1 Family Medicine  06/09/21

## 2021-06-09 NOTE — ASSESSMENT & PLAN NOTE
Patient is here for 1 week follow up of rash noted last week to be classic for shingles  Was prescribed 1 week course of Valtrex 1g TID, last day is tomorrow  Rash appears much improved - no blisters or crusting    Individual blisters have coalesced to form single linear erythematous dry patch (picture is in chart)  No other symptoms or concerns  will f/u on 06/25 with Dr Nedra Perez as scheduled

## 2021-06-10 DIAGNOSIS — M16.0 OSTEOARTHRITIS OF BOTH HIPS, UNSPECIFIED OSTEOARTHRITIS TYPE: ICD-10-CM

## 2021-06-11 RX ORDER — ACETAMINOPHEN 325 MG/1
TABLET ORAL
Qty: 10 TABLET | Refills: 11 | Status: SHIPPED | OUTPATIENT
Start: 2021-06-11

## 2021-06-25 ENCOUNTER — OFFICE VISIT (OUTPATIENT)
Dept: FAMILY MEDICINE CLINIC | Facility: CLINIC | Age: 61
End: 2021-06-25

## 2021-06-25 VITALS
HEIGHT: 58 IN | SYSTOLIC BLOOD PRESSURE: 108 MMHG | TEMPERATURE: 96.6 F | WEIGHT: 100 LBS | DIASTOLIC BLOOD PRESSURE: 62 MMHG | BODY MASS INDEX: 20.99 KG/M2

## 2021-06-25 DIAGNOSIS — E11.00 TYPE 2 DIABETES MELLITUS WITH HYPEROSMOLARITY WITHOUT COMA, WITHOUT LONG-TERM CURRENT USE OF INSULIN (HCC): ICD-10-CM

## 2021-06-25 DIAGNOSIS — R13.10 DYSPHAGIA, UNSPECIFIED TYPE: ICD-10-CM

## 2021-06-25 DIAGNOSIS — Z23 ENCOUNTER FOR IMMUNIZATION: ICD-10-CM

## 2021-06-25 DIAGNOSIS — Z11.1 TUBERCULOSIS SCREENING: ICD-10-CM

## 2021-06-25 DIAGNOSIS — E63.9 NUTRITIONAL DEFICIENCY: ICD-10-CM

## 2021-06-25 DIAGNOSIS — B02.9 HERPES ZOSTER WITHOUT COMPLICATION: ICD-10-CM

## 2021-06-25 DIAGNOSIS — R63.6 UNDERWEIGHT: ICD-10-CM

## 2021-06-25 DIAGNOSIS — M81.0 OSTEOPOROSIS, UNSPECIFIED OSTEOPOROSIS TYPE, UNSPECIFIED PATHOLOGICAL FRACTURE PRESENCE: Primary | ICD-10-CM

## 2021-06-25 PROCEDURE — 3008F BODY MASS INDEX DOCD: CPT | Performed by: FAMILY MEDICINE

## 2021-06-25 PROCEDURE — 1036F TOBACCO NON-USER: CPT | Performed by: FAMILY MEDICINE

## 2021-06-25 PROCEDURE — 99214 OFFICE O/P EST MOD 30 MIN: CPT | Performed by: FAMILY MEDICINE

## 2021-06-25 PROCEDURE — 96372 THER/PROPH/DIAG INJ SC/IM: CPT | Performed by: FAMILY MEDICINE

## 2021-06-25 PROCEDURE — 86580 TB INTRADERMAL TEST: CPT | Performed by: FAMILY MEDICINE

## 2021-06-25 NOTE — ASSESSMENT & PLAN NOTE
Lab Results   Component Value Date    HGBA1C 7 8 (H) 05/26/2021     - Last A1C 7 8% in 05/2021, uncontrolled  - Pt checks glucose at home: yes  - Range of blood sugars at home: 120-140's fasting   - Medications: Metformin 1000 mg BID  - Discussed additional medication at this time, will add DPPV at this time, Linagliptin 5 mg daily  - Recheck A1c in 08/2021, order provided to [facility] (RN, caregiver)  - Encouraged a healthy diet and exercise

## 2021-06-25 NOTE — ASSESSMENT & PLAN NOTE
Stable  - No recent aspiration episodes  - Currently checking pulse ox pre and post meals since 10/2020 with reading >95%  - Can discontinue pre and post meal pulse ox testing at this time  - Only test if patient has visible regurgitation episode, choking, or coughing  - Continue diet

## 2021-06-25 NOTE — ASSESSMENT & PLAN NOTE
Weight today 100 lb, BMI of 20 90  - Albumin decreased to 2 9  - Some concern from caregiver Lisa over nutritional deficiency  - Will check pre-albumin with CMP post-Prolia injection for osteoporosis  - Discussed diet modifications as well

## 2021-06-25 NOTE — PROGRESS NOTES
Assessment/Plan:    Dysphagia  Stable  - No recent aspiration episodes  - Currently checking pulse ox pre and post meals since 10/2020 with reading >95%  - Can discontinue pre and post meal pulse ox testing at this time  - Only test if patient has visible regurgitation episode, choking, or coughing  - Continue diet    Herpes zoster without complication  Resolved    Type 2 diabetes mellitus, without long-term current use of insulin ([facility])    Lab Results   Component Value Date    HGBA1C 7 8 (H) 05/26/2021     - Last A1C 7 8% in 05/2021, uncontrolled  - Pt checks glucose at home: yes  - Range of blood sugars at home: 120-140's fasting   - Medications: Metformin 1000 mg BID  - Discussed additional medication at this time, will add DPPV at this time, Linagliptin 5 mg daily  - Recheck A1c in 08/2021, order provided to [facility] (RN, caregiver)  - Encouraged a healthy diet and exercise        Underweight  Weight today 100 lb, BMI of 20 90  - Albumin decreased to 2 9  - Some concern from caregiver [facility] over nutritional deficiency  - Will check pre-albumin with CMP post-Prolia injection for osteoporosis  - Discussed diet modifications as well    Osteoporosis  Receiving Prolia injections  - Will receive Prolia injection today 06/25       Diagnoses and all orders for this visit:    Osteoporosis, unspecified osteoporosis type, unspecified pathological fracture presence  -     Comprehensive metabolic panel; Future    Type 2 diabetes mellitus with hyperosmolarity without coma, without long-term current use of insulin ([facility])  -     linaGLIPtin 5 MG TABS; Take 5 mg by mouth daily  -     HEMOGLOBIN A1C W/ EAG ESTIMATION; Future    Nutritional deficiency  -     Prealbumin; Future    Dysphagia, unspecified type    Herpes zoster without complication    Tuberculosis screening  -     TB Skin Test    Encounter for immunization    Underweight        Subjective:      Patient ID: Vitaly Bryant is a 63 y o  female      70-year-old female presenting with her caregiver, Sree Mina, to address a few concerns today  Her 1st is that she had a recent shingles infection which has completely resolved at this time  She also needs her Prolia injection today to which Sree Leopoldo brought to the office  Sree Mina also mentions that they have been checking her pre meal and post meal pulse ox and that all the readings have been greater than 95%  She is wondering if they can discontinue those at this time unless they notice if the patient is having any difficulty swallowing or choking  She says that she is tolerating her pureed diet very well and has had no issues lately  Another concern is for her diabetes and that her A1c had increased at her last visit  She says that she is giving her pure/premier protein shakes up to twice a day and that she has checked to see that they are low in carbohydrates and sugar  The following portions of the patient's history were reviewed and updated as appropriate: allergies, current medications, past family history, past medical history, past social history, past surgical history and problem list     Review of Systems   Unable to perform ROS: Patient nonverbal         Objective:      /62 (BP Location: Left arm, Patient Position: Sitting, Cuff Size: Standard)   Temp (!) 96 6 °F (35 9 °C) (Temporal)   Ht 4' 10" (1 473 m)   Wt 45 4 kg (100 lb)   LMP 11/29/2009 (Exact Date)   BMI 20 90 kg/m²          Physical Exam  Vitals reviewed  Constitutional:       Appearance: Normal appearance  HENT:      Head: Normocephalic and atraumatic  Cardiovascular:      Rate and Rhythm: Normal rate and regular rhythm  Pulses: Normal pulses  Heart sounds: Normal heart sounds  Pulmonary:      Effort: Pulmonary effort is normal    Abdominal:      General: Bowel sounds are normal    Skin:     General: Skin is warm and dry  Neurological:      Mental Status: She is alert  Mental status is at baseline

## 2021-07-12 ENCOUNTER — APPOINTMENT (OUTPATIENT)
Dept: LAB | Facility: HOSPITAL | Age: 61
End: 2021-07-12
Payer: COMMERCIAL

## 2021-07-12 DIAGNOSIS — E63.9 NUTRITIONAL DEFICIENCY: ICD-10-CM

## 2021-07-12 DIAGNOSIS — M81.0 OSTEOPOROSIS, UNSPECIFIED OSTEOPOROSIS TYPE, UNSPECIFIED PATHOLOGICAL FRACTURE PRESENCE: ICD-10-CM

## 2021-07-12 DIAGNOSIS — E11.00 TYPE 2 DIABETES MELLITUS WITH HYPEROSMOLARITY WITHOUT COMA, WITHOUT LONG-TERM CURRENT USE OF INSULIN (HCC): ICD-10-CM

## 2021-07-12 LAB
ALBUMIN SERPL BCP-MCNC: 2.8 G/DL (ref 3.5–5)
ALP SERPL-CCNC: 54 U/L (ref 46–116)
ALT SERPL W P-5'-P-CCNC: 9 U/L (ref 12–78)
ANION GAP SERPL CALCULATED.3IONS-SCNC: 2 MMOL/L (ref 4–13)
AST SERPL W P-5'-P-CCNC: 6 U/L (ref 5–45)
BILIRUB SERPL-MCNC: 0.17 MG/DL (ref 0.2–1)
BUN SERPL-MCNC: 14 MG/DL (ref 5–25)
CALCIUM ALBUM COR SERPL-MCNC: 10 MG/DL (ref 8.3–10.1)
CALCIUM SERPL-MCNC: 9 MG/DL (ref 8.3–10.1)
CHLORIDE SERPL-SCNC: 103 MMOL/L (ref 100–108)
CO2 SERPL-SCNC: 35 MMOL/L (ref 21–32)
CREAT SERPL-MCNC: 0.9 MG/DL (ref 0.6–1.3)
GFR SERPL CREATININE-BSD FRML MDRD: 70 ML/MIN/1.73SQ M
GLUCOSE SERPL-MCNC: 230 MG/DL (ref 65–140)
POTASSIUM SERPL-SCNC: 4.1 MMOL/L (ref 3.5–5.3)
PREALB SERPL-MCNC: 26.4 MG/DL (ref 18–40)
PROT SERPL-MCNC: 5.8 G/DL (ref 6.4–8.2)
SODIUM SERPL-SCNC: 140 MMOL/L (ref 136–145)

## 2021-07-12 PROCEDURE — 36415 COLL VENOUS BLD VENIPUNCTURE: CPT

## 2021-07-12 PROCEDURE — 84134 ASSAY OF PREALBUMIN: CPT

## 2021-07-12 PROCEDURE — 80053 COMPREHEN METABOLIC PANEL: CPT

## 2021-07-13 ENCOUNTER — LAB REQUISITION (OUTPATIENT)
Dept: LAB | Facility: HOSPITAL | Age: 61
End: 2021-07-13

## 2021-07-13 DIAGNOSIS — Z77.21 CONTACT WITH AND (SUSPECTED) EXPOSURE TO POTENTIALLY HAZARDOUS BODY FLUIDS: ICD-10-CM

## 2021-07-13 LAB
HBV SURFACE AG SER QL: NORMAL
HCV AB SER QL: NORMAL
HIV 1+2 AB+HIV1 P24 AG SERPL QL IA: NORMAL
HIV1 P24 AG SER QL: NORMAL

## 2021-07-13 PROCEDURE — 87340 HEPATITIS B SURFACE AG IA: CPT | Performed by: PHYSICIAN ASSISTANT

## 2021-07-13 PROCEDURE — 86803 HEPATITIS C AB TEST: CPT | Performed by: PHYSICIAN ASSISTANT

## 2021-07-13 PROCEDURE — 87806 HIV AG W/HIV1&2 ANTB W/OPTIC: CPT | Performed by: PHYSICIAN ASSISTANT

## 2021-07-27 ENCOUNTER — TELEPHONE (OUTPATIENT)
Dept: LAB | Facility: HOSPITAL | Age: 61
End: 2021-07-27

## 2021-08-03 ENCOUNTER — TELEPHONE (OUTPATIENT)
Dept: FAMILY MEDICINE CLINIC | Facility: CLINIC | Age: 61
End: 2021-08-03

## 2021-08-03 ENCOUNTER — APPOINTMENT (OUTPATIENT)
Dept: LAB | Facility: CLINIC | Age: 61
End: 2021-08-03
Payer: COMMERCIAL

## 2021-08-03 ENCOUNTER — HOSPITAL ENCOUNTER (OUTPATIENT)
Dept: RADIOLOGY | Facility: HOSPITAL | Age: 61
Discharge: HOME | End: 2021-08-03
Payer: COMMERCIAL

## 2021-08-03 ENCOUNTER — TELEMEDICINE (OUTPATIENT)
Dept: FAMILY MEDICINE CLINIC | Facility: CLINIC | Age: 61
End: 2021-08-03

## 2021-08-03 VITALS
SYSTOLIC BLOOD PRESSURE: 85 MMHG | DIASTOLIC BLOOD PRESSURE: 49 MMHG | TEMPERATURE: 99.2 F | OXYGEN SATURATION: 100 % | HEART RATE: 74 BPM

## 2021-08-03 DIAGNOSIS — R50.9 FEVER OF UNKNOWN ORIGIN: ICD-10-CM

## 2021-08-03 DIAGNOSIS — R50.9 FEVER OF UNKNOWN ORIGIN: Primary | ICD-10-CM

## 2021-08-03 LAB
BASOPHILS # BLD AUTO: 0.03 THOUSANDS/ΜL (ref 0–0.1)
BASOPHILS NFR BLD AUTO: 1 % (ref 0–1)
EOSINOPHIL # BLD AUTO: 0.26 THOUSAND/ΜL (ref 0–0.61)
EOSINOPHIL NFR BLD AUTO: 5 % (ref 0–6)
ERYTHROCYTE [DISTWIDTH] IN BLOOD BY AUTOMATED COUNT: 13.3 % (ref 11.6–15.1)
HCT VFR BLD AUTO: 36.5 % (ref 34.8–46.1)
HGB BLD-MCNC: 11.7 G/DL (ref 11.5–15.4)
IMM GRANULOCYTES # BLD AUTO: 0.05 THOUSAND/UL (ref 0–0.2)
IMM GRANULOCYTES NFR BLD AUTO: 1 % (ref 0–2)
LYMPHOCYTES # BLD AUTO: 1.92 THOUSANDS/ΜL (ref 0.6–4.47)
LYMPHOCYTES NFR BLD AUTO: 36 % (ref 14–44)
MCH RBC QN AUTO: 34.5 PG (ref 26.8–34.3)
MCHC RBC AUTO-ENTMCNC: 32.1 G/DL (ref 31.4–37.4)
MCV RBC AUTO: 108 FL (ref 82–98)
MONOCYTES # BLD AUTO: 0.49 THOUSAND/ΜL (ref 0.17–1.22)
MONOCYTES NFR BLD AUTO: 9 % (ref 4–12)
NEUTROPHILS # BLD AUTO: 2.65 THOUSANDS/ΜL (ref 1.85–7.62)
NEUTS SEG NFR BLD AUTO: 48 % (ref 43–75)
NRBC BLD AUTO-RTO: 0 /100 WBCS
PLATELET # BLD AUTO: 148 THOUSANDS/UL (ref 149–390)
PMV BLD AUTO: 11.9 FL (ref 8.9–12.7)
PROCALCITONIN SERPL-MCNC: <0.05 NG/ML
RBC # BLD AUTO: 3.39 MILLION/UL (ref 3.81–5.12)
WBC # BLD AUTO: 5.4 THOUSAND/UL (ref 4.31–10.16)

## 2021-08-03 PROCEDURE — 36415 COLL VENOUS BLD VENIPUNCTURE: CPT

## 2021-08-03 PROCEDURE — 99213 OFFICE O/P EST LOW 20 MIN: CPT | Performed by: FAMILY MEDICINE

## 2021-08-03 PROCEDURE — 71046 X-RAY EXAM CHEST 2 VIEWS: CPT

## 2021-08-03 PROCEDURE — 1036F TOBACCO NON-USER: CPT | Performed by: FAMILY MEDICINE

## 2021-08-03 PROCEDURE — 84145 PROCALCITONIN (PCT): CPT

## 2021-08-03 PROCEDURE — 85025 COMPLETE CBC W/AUTO DIFF WBC: CPT

## 2021-08-03 NOTE — TELEPHONE ENCOUNTER
Claude Connell called state patient had a low grade fever of 99 2 this moring and is very tired, lungs sound clear but thinks she might be coming down with a urinary tract infection and would like a script for a urine &culture test faxed to [phone]

## 2021-08-03 NOTE — PROGRESS NOTES
Virtual Regular Visit    Verification of patient location:    Patient is located in the following state in which I hold an active license [place]      Assessment/Plan:    Problem List Items Addressed This Visit     None      Visit Diagnoses     Fever of unknown origin    -  Primary    Relevant Orders    Urinalysis with microscopic    Urine culture    CBC and differential (Completed)    XR chest pa & lateral (Completed)    Procalcitonin    Novel Coronavirus (COVID-19), PCR [facility] at   [facility] or [facility]        Elevated temperature  -unknown origin with concern for possible UTI   Presenting with decrease PO intake, fatigue, appeared more pale and higher temperature than patient typically runs ( 99 2 versus her usual at 96-97F)  Due to hx of recurrent UTI with hx of previous urosepsis plan to check UA, Urine culture, CBC, Procal, and CXR  Ed precautions discussed        Reason for visit is   Chief Complaint   Patient presents with    Virtual Regular Visit        Encounter provider Terrie Coppola DO    Provider located at [address] [place] [address]   [phone]      Recent Visits  Date Type Provider Dept   07/27/21 Telephone DO [facility]   Showing recent visits within past 7 days and meeting all other requirements  Today's Visits  Date Type Provider Dept   08/03/21 Telephone [facility]   08/03/21 [address] [place] [address], [address]    08/03/21 Telephone [id number] today's visits and meeting all other requirements  Future Appointments  No visits were found meeting these conditions  Showing future appointments within next 150 days and meeting all other requirements       The patient was identified by name and date of birth   Oz Pearson was informed that this is a telemedicine visit and that the visit is being conducted through [facility] and patient was informed that this is a secure, HIPAA-compliant platform  She agrees to proceed     My office door was closed  No one else was in the room  She acknowledged consent and understanding of privacy and security of the video platform  The patient has agreed to participate and understands they can discontinue the visit at any time  Patient is aware this is a billable service  Subjective  HPI:  Pilo Felton is a 63 y o  female with hx of Cerebral Palsy, Type 2DM, Sigmoid Volvulus, and Dysphagia who presents due to concern for urinary tract infection  Patient was accompanied on the visit by Swetha Spine her nurse who provided history  Patient was observed yesterday to be "normal" with normal appetite, no changes in energy level or concerns from nursing staff  However overnight the patient was noted to be an elevated temperature of her normal range  She typically runs around 96 to 97F but she was checked at was at 99 1  This morning, she had decrease intake of fluid, only about 4oz, her temperature was 99 2 and after her shower she was about 97F but typically drops to 95F  She was also noted to have decrease in energy and not as interactive in the morning  She appeared more tired at the table during breakfast  She appeared also more pale and her back felt warm to touch per the nursing staff  She has not had any cough or rhinorrhea  Patient does have a history of recurrent UTI which in the past has transitioned to urosepsis leading to prolonged hospital stay  Due to her history, concern from staff about underlying infection  No changes in urine per staff         Past Medical History:   Diagnosis Date    Adjustment disorder     Altered mental status 12/11/2015    Anemia     Bipolar 1 disorder (HCC)     C[facility]bral palsy [facility]     Chronic hypernatremia 2/6/2016    Closed fracture of left hip ([facility] ) 1/19/2016    Closed left hip fracture (HCC)     no surgery    Constipation     Dehydration 2/20/2016    Diabetes mellitus ([facility] )     Disease of thyroid gland     Diverticulosis     Fracture of multiple ribs of right side     Hip fracture ([facility]) 07/26/2015    left    Hyperlipidemia     Hypernatremia 12/28/2018    Hypotension     Impulse control disorder     Incontinence     Intellectual disability due to developmental disorder, unspecified     Microalbuminuria     Osteopathia     Osteoporosis     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HCC)     [facility]ocyt[facility] 7/31/2015       Past Surgical History:   Procedure Laterality Date    ABDOMINAL SURGERY      COLECTOMY MIN      re-anastomosis 7/22/16    COLONOSCOPY N/A 7/21/2016    Procedure: COLONOSCOPY;  Surgeon: Obinna Russell MD;  [facility]; Service:     COLONOSCOPY N/A 1/31/2016    Procedure: COLONOSCOPY;  Surgeon: Obinna Russell MD;  [facility];  Service:     COLONOSCOPY N/A 7/25/2017    Procedure: COLONOSCOPY;  Surgeon: Obinna Russell MD;  [facility];   Service: Colorectal    COLOSTOMY      EXPLORATORY LAPAROTOMY W/ BOWEL RESECTION N/A 1/31/2016    Procedure: exploratory laparotomy, left sigmoidectomy, coloproctostomy, take down splenic flexure, loop colostomy;  Surgeon: Obinna Russell MD;  [facility];  Service:     CA CLOSE ENTEROSTOMY N/A 7/22/2016    Procedure: SEGMENTAL COLECTOMY WITH COLOCOLOSTOMY;  Surgeon: Obinna Russell MD;  [facility];  Service: Colorectal    UPPER GASTROINTESTINAL ENDOSCOPY         Current Outpatient Medications   Medication Sig Dispense Refill    acetaminophen (TYLENOL) 325 mg tablet TAKE 2 TABLETS BY MOUTH (650MG) EVERY 6 HOURS AS NEEDED FOR PAIN / FEVER GREATER THAN 100 4 10 tablet 11    ARIPiprazole (ABILIFY) 30 mg tablet Take 1 tablet (30 mg) by mouth daily at 8 pm  0    baclofen 10 mg tablet Take 1 tablet (10 mg total) by mouth 3 (three) times a day 90 tablet 11    benzonatate (TESSALON PERLES) 100 mg capsule Take 1 capsule (100 mg total) by mouth every 8 (eight) hours as needed for cough 30 capsule 0    calcium carbonate (OS-MORRIS) 600 MG tablet Take 1 tablet (600 mg total) by mouth 2 (two) times a day with meals 60 tablet 2    carbamide peroxide (DEBROX) 6 5 % otic solution Administer 2 drops into both ears daily as needed 2 drops in the affected ear prn x 5 days      carboxymethylcellulose (REFRESH TEARS) 0 5 % SOLN 1 drop 3 (three) times a day as needed for dry eyes      Cholecalciferol (Vitamin D) 50 MCG (2000 UT) tablet Take 1 tablet (2,000 Units total) by mouth daily 30 tablet 5    Citalopram Hydrobromide (CELEXA PO) Take 40 mg by mouth every morning        denosumab (PROLIA) 60 mg/mL Inject 1 mL (60 mg total) under the skin once for 1 dose 1 mL 1    docosanol (ABREVA) 10 % Apply topically 5 (five) times a day Apply and rub in 5x a day until healed as needed for lesion  0    ferrous sulfate 324 (65 Fe) mg Take 325 mg by mouth 2 (two) times a day   fexofenadine (ALLEGRA) 180 MG tablet Take 180 mg by mouth as needed        fluticasone (FLONASE) 50 mcg/act nasal spray 1 spray into each nostril daily as needed for rhinitis or allergies 1 Bottle 1    guaiFENesin (ROBITUSSIN) 100 MG/5ML oral liquid Take 200 mg by mouth every 8 (eight) hours as needed 2 tsp every 8 hours as needed for cough      hydrocortisone 1 % cream Apply topically to affected area twice daily as needed for rash 30 g 0    levothyroxine (SYNTHROID) 25 mcg tablet Take 1 tablet (25 mcg) by mouth every morning  0    linaGLIPtin 5 MG TABS Take 5 mg by mouth daily 90 tablet 0    LORazepam (ATIVAN) 0 5 mg tablet Take 0 5 mg by mouth 2 (two) times a day          metFORMIN (GLUCOPHAGE) 1000 MG tablet Take 1 tablet (1,000 mg total) by mouth 2 (two) times a day with meals 60 tablet 2    neomycin-bacitracin-polymyxin b (NEOSPORIN) ointment Apply 1 application topically 2 (two) times a day as needed      nitrofurantoin (MACRODANTIN) 50 mg capsule Take 50 mg by mouth daily at bedtime      olopatadine HCl (PATADAY) 0 2 % opth drops Administer 1 drop to both eyes as needed      omeprazole (PriLOSEC) 20 mg delayed release capsule Take 1 capsule (20 mg total) by mouth 2 (two) times a day before meals 60 capsule 2    Oxybutynin Chloride (DITROPAN XL PO) Take 5 mg by mouth 3 (three) times a day       polyethylene glycol (GLYCOLAX) 17 GM/SCOOP powder DISSOLVE 17GM (1 CAPFUL) IN 8 OZ FLUIDS AND CONSUME BY MOUTH EVERY MORNING *HOLD 1 DAY FOR LOOSE STOOLS* (CONSTIPATION) 510 g 10    simvastatin (ZOCOR) 20 mg tablet Take 1 tablet (20 mg total) by mouth daily with dinner at 4 pm for hyperlipidemia      Starch, Thickening, POWD Take by mouth daily Use on food and liquid as directed 1020 g 3    traZODone (DESYREL) 100 mg tablet Take 1 tablet (100 mg total) by mouth daily at  8 pm for depression  0    valACYclovir (VALTREX) 1,000 mg tablet Take 1 tablet (1,000 mg total) by mouth 3 (three) times a day for 7 days 21 tablet 0    valproic acid (DEPAKENE) 250 MG/5ML soln Take 10 mL (500 mg total) by mouth 2 (two) times a day 600 mL 1    Vitamins A & D (VITAMIN A & D) ointment Apply topically as needed for dry skin       No current facility-administered medications for this visit  No Known Allergies    Review of Systems   Constitutional: Positive for activity change, appetite change, fatigue and fever  Negative for chills  HENT: Negative for congestion and rhinorrhea  Respiratory: Negative for cough and shortness of breath  Genitourinary: Negative for decreased urine volume and hematuria  Skin: Positive for pallor  Neurological: Negative for weakness  Video Exam    Vitals:    08/03/21 1500   BP: (!) 85/49   Pulse: 74   Temp: 99 2 °F (37 3 °C)   SpO2: 100%       Physical Exam     I spent 25 minutes directly with the patient during this visit    [address] [place] [address] 151 verbally agrees to participate in [employer]   Pt is aware that [employer] could be limited without vital signs or the ability to perform a full hands-on physical Mara understands she or the provider may request at any time to terminate the video visit and request the patient to seek care or treatment in person

## 2021-08-05 ENCOUNTER — APPOINTMENT (OUTPATIENT)
Dept: LAB | Age: 61
End: 2021-08-05
Payer: COMMERCIAL

## 2021-08-05 DIAGNOSIS — R50.9 FEVER OF UNKNOWN ORIGIN: ICD-10-CM

## 2021-08-05 LAB
BACTERIA UR QL AUTO: ABNORMAL /HPF
BILIRUB UR QL STRIP: NEGATIVE
CLARITY UR: ABNORMAL
COLOR UR: YELLOW
GLUCOSE UR STRIP-MCNC: NEGATIVE MG/DL
HGB UR QL STRIP.AUTO: NEGATIVE
HYALINE CASTS #/AREA URNS LPF: ABNORMAL /LPF
KETONES UR STRIP-MCNC: ABNORMAL MG/DL
LEUKOCYTE ESTERASE UR QL STRIP: ABNORMAL
NITRITE UR QL STRIP: NEGATIVE
NON-SQ EPI CELLS URNS QL MICRO: ABNORMAL /HPF
PH UR STRIP.AUTO: 8 [PH]
PROT UR STRIP-MCNC: NEGATIVE MG/DL
RBC #/AREA URNS AUTO: ABNORMAL /HPF
SP GR UR STRIP.AUTO: 1.01 (ref 1–1.03)
UROBILINOGEN UR QL STRIP.AUTO: 0.2 E.U./DL
WBC #/AREA URNS AUTO: ABNORMAL /HPF

## 2021-08-05 PROCEDURE — 87086 URINE CULTURE/COLONY COUNT: CPT

## 2021-08-05 PROCEDURE — 81001 URINALYSIS AUTO W/SCOPE: CPT | Performed by: FAMILY MEDICINE

## 2021-08-06 LAB — BACTERIA UR CULT: NORMAL

## 2021-08-10 ENCOUNTER — ANNUAL EXAM (OUTPATIENT)
Dept: FAMILY MEDICINE CLINIC | Facility: CLINIC | Age: 61
End: 2021-08-10

## 2021-08-10 VITALS
WEIGHT: 92 LBS | RESPIRATION RATE: 14 BRPM | SYSTOLIC BLOOD PRESSURE: 102 MMHG | TEMPERATURE: 96.7 F | BODY MASS INDEX: 19.31 KG/M2 | HEIGHT: 58 IN | HEART RATE: 78 BPM | DIASTOLIC BLOOD PRESSURE: 68 MMHG

## 2021-08-10 DIAGNOSIS — Z12.4 SCREENING FOR MALIGNANT NEOPLASM OF CERVIX: ICD-10-CM

## 2021-08-10 DIAGNOSIS — M81.0 OSTEOPOROSIS, UNSPECIFIED OSTEOPOROSIS TYPE, UNSPECIFIED PATHOLOGICAL FRACTURE PRESENCE: Primary | ICD-10-CM

## 2021-08-10 DIAGNOSIS — Z12.4 SCREENING FOR CERVICAL CANCER: ICD-10-CM

## 2021-08-10 DIAGNOSIS — Z12.31 ENCOUNTER FOR SCREENING MAMMOGRAM FOR MALIGNANT NEOPLASM OF BREAST: ICD-10-CM

## 2021-08-10 PROCEDURE — 87624 HPV HI-RISK TYP POOLED RSLT: CPT | Performed by: FAMILY MEDICINE

## 2021-08-10 PROCEDURE — 88175 CYTOPATH C/V AUTO FLUID REDO: CPT | Performed by: FAMILY MEDICINE

## 2021-08-10 PROCEDURE — 99213 OFFICE O/P EST LOW 20 MIN: CPT | Performed by: FAMILY MEDICINE

## 2021-08-10 PROCEDURE — 3008F BODY MASS INDEX DOCD: CPT | Performed by: FAMILY MEDICINE

## 2021-08-10 NOTE — PATIENT INSTRUCTIONS
Pap Smear   WHAT YOU SHOULD KNOW:   A Pap smear, or Pap test, is a procedure to check your cervix for abnormal cells  The cervix is the narrow opening at the bottom of your uterus  The cervix meets the top part of the vagina  INSTRUCTIONS:   Follow up with your primary healthcare provider (PHP) or OB-GYN as directed: You may need to return to discuss your procedure results  Write down your questions so you remember to ask them during your visits  Contact your PHP or OB-GYN if:   · You have more vaginal bleeding than expected  · You do not receive your results  · You have questions or concerns about your condition or care  © 2014 [address] is for End User's use only and may not be sold, redistributed or otherwise used for commercial purposes  All illustrations and images included in CareNotes® are the copyrighted property of [employer]  or Thad Jackson  The above information is an  only  It is not intended as medical advice for individual conditions or treatments  Talk to your doctor, nurse or pharmacist before following any medical regimen to see if it is safe and effective for you

## 2021-08-10 NOTE — PROGRESS NOTES
ANNUAL GYN VISIT:    [facility] was seen today for gynecologic exam     Diagnoses and all orders for this visit:    Osteoporosis, unspecified osteoporosis type, unspecified pathological fracture presence  -     CK (with reflex to MB); Future    Encounter for screening mammogram for malignant neoplasm of breast  -     Mammo screening bilateral w cad; Future    Screening for cervical cancer  -     Liquid-based pap, diagnostic  -     HPV High Risk    Screening for malignant neoplasm of cervix       Patient presented for Gyn Exam  Pap smear performed which patient tolerated well  Minimal thick white discharge found around cervix but no friable tissue observed  PAP with HPV done today     Osteoporosis  -History of osteoporosis with a T-score of -3 1 in the right hip  on last DEXA scan in 2020  -She is also receiving Prolia injections with last Prolia injection in   -placed pre and post injection labs (CK ordered)      Kevon Belle is a 63 y o  female who presents for annual well woman exam      GYN: history provided by caregiver Jane  · Denies vaginal discharge, labial erythema or lesions, dyspareunia  · Menarche at unknown  · Menses are irregular  · Contraception: none  · Patient is not sexually active  · Last pap smear was in 10/19/2017   Results were negative   · No gynecologic surgeries  OB:  · [id number]  · Pregnancies:none  :  · No dysuria, urinary frequency or urgency  · No hematuria, flank pain, incontinence  Breast:  · No breast mass, skin changes, dimpling, reddening, nipple retraction  · No breast discharge  Last mammogram was in    Results showed "Almost certainly benign hematoma in the shallow soft tissues of the far upper slightly inner right breast corresponding to the mammographic finding questioned on recent screening mammogram     Ultrasound follow-up evaluation in 3 months to determine stability or progression of this finding is recommended    Otherwise no mammographic or sonographic evidence of malignancy "         Review of Systems   Unable to perform ROS: Patient nonverbal         Objective   Vitals:    08/10/21 1101   BP: 102/68   Pulse: 78   Resp: 14   Temp: (!) 96 7 °F (35 9 °C)           Physical Exam  Vitals reviewed  Exam conducted with a chaperone present  Constitutional:       General: She is not in acute distress  Appearance: She is not ill-appearing  Pulmonary:      Effort: Pulmonary effort is normal    Abdominal:      General: Abdomen is flat  Genitourinary:     General: Normal vulva  Exam position: Lithotomy position  Pubic Area: No rash  Vagina: Normal  No signs of injury  No vaginal discharge or erythema  Cervix: Discharge (minimal thick white discharge around cervix) present  No lesion, erythema or cervical bleeding  Musculoskeletal:      Right lower leg: No edema  Left lower leg: No edema  Skin:     General: Skin is warm and dry  Neurological:      Mental Status: She is alert

## 2021-08-12 LAB
HPV HR 12 DNA CVX QL NAA+PROBE: NEGATIVE
HPV16 DNA CVX QL NAA+PROBE: NEGATIVE
HPV18 DNA CVX QL NAA+PROBE: NEGATIVE

## 2021-08-12 NOTE — ASSESSMENT & PLAN NOTE
-History of osteoporosis with a T-score of -3 1 in the right hip  on last DEXA scan in March 2020  -She is also receiving Prolia injections with last Prolia injection in June 25th  -placed pre and post injection labs (CK ordered)

## 2021-08-17 LAB
LAB AP GYN PRIMARY INTERPRETATION: NORMAL
Lab: NORMAL

## 2021-08-19 ENCOUNTER — APPOINTMENT (EMERGENCY)
Dept: RADIOLOGY | Facility: HOSPITAL | Age: 61
DRG: 690 | End: 2021-08-19
Payer: COMMERCIAL

## 2021-08-19 ENCOUNTER — HOSPITAL ENCOUNTER (INPATIENT)
Facility: HOSPITAL | Age: 61
LOS: 6 days | Discharge: HOME | DRG: 690 | End: 2021-08-26
Attending: EMERGENCY MEDICINE | Admitting: INTERNAL MEDICINE
Payer: COMMERCIAL

## 2021-08-19 ENCOUNTER — APPOINTMENT (EMERGENCY)
Dept: CT IMAGING | Facility: HOSPITAL | Age: 61
DRG: 690 | End: 2021-08-19
Payer: COMMERCIAL

## 2021-08-19 DIAGNOSIS — R41.82 ALTERED MENTAL STATUS: Primary | ICD-10-CM

## 2021-08-19 DIAGNOSIS — N39.0 UTI (URINARY TRACT INFECTION): ICD-10-CM

## 2021-08-19 DIAGNOSIS — K59.00 CONSTIPATION: ICD-10-CM

## 2021-08-19 DIAGNOSIS — E87.0 HYPERNATREMIA: ICD-10-CM

## 2021-08-19 PROBLEM — E83.52 HYPERCALCEMIA: Status: ACTIVE | Noted: 2021-08-19

## 2021-08-19 LAB
ALBUMIN SERPL BCP-MCNC: 2.9 G/DL (ref 3.5–5)
ALP SERPL-CCNC: 57 U/L (ref 46–116)
ALT SERPL W P-5'-P-CCNC: 11 U/L (ref 12–78)
ANION GAP SERPL CALCULATED.3IONS-SCNC: 9 MMOL/L (ref 4–13)
AST SERPL W P-5'-P-CCNC: 8 U/L (ref 5–45)
ATRIAL RATE: 89 BPM
ATRIAL RATE: 92 BPM
BACTERIA UR QL AUTO: ABNORMAL /HPF
BASOPHILS # BLD AUTO: 0.03 THOUSANDS/ΜL (ref 0–0.1)
BASOPHILS NFR BLD AUTO: 0 % (ref 0–1)
BILIRUB SERPL-MCNC: 0.17 MG/DL (ref 0.2–1)
BILIRUB UR QL STRIP: NEGATIVE
BUN SERPL-MCNC: 11 MG/DL (ref 5–25)
CALCIUM ALBUM COR SERPL-MCNC: 10.7 MG/DL (ref 8.3–10.1)
CALCIUM SERPL-MCNC: 9.8 MG/DL (ref 8.3–10.1)
CHLORIDE SERPL-SCNC: 103 MMOL/L (ref 100–108)
CLARITY UR: ABNORMAL
CO2 SERPL-SCNC: 32 MMOL/L (ref 21–32)
COLOR UR: ABNORMAL
CREAT SERPL-MCNC: 1.05 MG/DL (ref 0.6–1.3)
EOSINOPHIL # BLD AUTO: 0.23 THOUSAND/ΜL (ref 0–0.61)
EOSINOPHIL NFR BLD AUTO: 3 % (ref 0–6)
ERYTHROCYTE [DISTWIDTH] IN BLOOD BY AUTOMATED COUNT: 13.2 % (ref 11.6–15.1)
GFR SERPL CREATININE-BSD FRML MDRD: 58 ML/MIN/1.73SQ M
GLUCOSE SERPL-MCNC: 138 MG/DL (ref 65–140)
GLUCOSE SERPL-MCNC: 185 MG/DL (ref 65–140)
GLUCOSE SERPL-MCNC: 191 MG/DL (ref 65–140)
GLUCOSE UR STRIP-MCNC: ABNORMAL MG/DL
HCT VFR BLD AUTO: 36.8 % (ref 34.8–46.1)
HGB BLD-MCNC: 11.8 G/DL (ref 11.5–15.4)
HGB UR QL STRIP.AUTO: ABNORMAL
IMM GRANULOCYTES # BLD AUTO: 0.05 THOUSAND/UL (ref 0–0.2)
IMM GRANULOCYTES NFR BLD AUTO: 1 % (ref 0–2)
KETONES UR STRIP-MCNC: NEGATIVE MG/DL
LACTATE SERPL-SCNC: 2.6 MMOL/L (ref 0.5–2)
LACTATE SERPL-SCNC: 3.8 MMOL/L (ref 0.5–2)
LEUKOCYTE ESTERASE UR QL STRIP: ABNORMAL
LIPASE SERPL-CCNC: 173 U/L (ref 73–393)
LYMPHOCYTES # BLD AUTO: 2.25 THOUSANDS/ΜL (ref 0.6–4.47)
LYMPHOCYTES NFR BLD AUTO: 26 % (ref 14–44)
MCH RBC QN AUTO: 34.7 PG (ref 26.8–34.3)
MCHC RBC AUTO-ENTMCNC: 32.1 G/DL (ref 31.4–37.4)
MCV RBC AUTO: 108 FL (ref 82–98)
MONOCYTES # BLD AUTO: 0.72 THOUSAND/ΜL (ref 0.17–1.22)
MONOCYTES NFR BLD AUTO: 8 % (ref 4–12)
NEUTROPHILS # BLD AUTO: 5.31 THOUSANDS/ΜL (ref 1.85–7.62)
NEUTS SEG NFR BLD AUTO: 62 % (ref 43–75)
NITRITE UR QL STRIP: NEGATIVE
NON-SQ EPI CELLS URNS QL MICRO: ABNORMAL /HPF
NRBC BLD AUTO-RTO: 0 /100 WBCS
P AXIS: 37 DEGREES
PH UR STRIP.AUTO: 6 [PH]
PLATELET # BLD AUTO: 143 THOUSANDS/UL (ref 149–390)
PMV BLD AUTO: 12.2 FL (ref 8.9–12.7)
POTASSIUM SERPL-SCNC: 3.2 MMOL/L (ref 3.5–5.3)
PR INTERVAL: 126 MS
PROT SERPL-MCNC: 6.9 G/DL (ref 6.4–8.2)
PROT UR STRIP-MCNC: NEGATIVE MG/DL
QRS AXIS: 51 DEGREES
QRS AXIS: 55 DEGREES
QRSD INTERVAL: 72 MS
QRSD INTERVAL: 72 MS
QT INTERVAL: 332 MS
QT INTERVAL: 374 MS
QTC INTERVAL: 402 MS
QTC INTERVAL: 445 MS
RBC # BLD AUTO: 3.4 MILLION/UL (ref 3.81–5.12)
RBC #/AREA URNS AUTO: ABNORMAL /HPF
SARS-COV-2 RNA RESP QL NAA+PROBE: NEGATIVE
SODIUM SERPL-SCNC: 144 MMOL/L (ref 136–145)
SP GR UR STRIP.AUTO: 1.01 (ref 1–1.03)
T WAVE AXIS: 40 DEGREES
T WAVE AXIS: 45 DEGREES
TROPONIN I SERPL-MCNC: <0.02 NG/ML
TSH SERPL DL<=0.05 MIU/L-ACNC: 2.18 UIU/ML (ref 0.36–3.74)
UROBILINOGEN UR QL STRIP.AUTO: 0.2 E.U./DL
VENTRICULAR RATE: 85 BPM
VENTRICULAR RATE: 88 BPM
WBC # BLD AUTO: 8.59 THOUSAND/UL (ref 4.31–10.16)
WBC #/AREA URNS AUTO: ABNORMAL /HPF

## 2021-08-19 PROCEDURE — 96361 HYDRATE IV INFUSION ADD-ON: CPT

## 2021-08-19 PROCEDURE — 99285 EMERGENCY DEPT VISIT HI MDM: CPT

## 2021-08-19 PROCEDURE — U0003 INFECTIOUS AGENT DETECTION BY NUCLEIC ACID (DNA OR RNA); SEVERE ACUTE RESPIRATORY SYNDROME CORONAVIRUS 2 (SARS-COV-2) (CORONAVIRUS DISEASE [COVID-19]), AMPLIFIED PROBE TECHNIQUE, MAKING USE OF HIGH THROUGHPUT TECHNOLOGIES AS DESCRIBED BY CMS-2020-01-R: HCPCS | Performed by: PHYSICIAN ASSISTANT

## 2021-08-19 PROCEDURE — 82948 REAGENT STRIP/BLOOD GLUCOSE: CPT

## 2021-08-19 PROCEDURE — 74177 CT ABD & PELVIS W/CONTRAST: CPT

## 2021-08-19 PROCEDURE — 80053 COMPREHEN METABOLIC PANEL: CPT | Performed by: EMERGENCY MEDICINE

## 2021-08-19 PROCEDURE — 84443 ASSAY THYROID STIM HORMONE: CPT | Performed by: PHYSICIAN ASSISTANT

## 2021-08-19 PROCEDURE — 96374 THER/PROPH/DIAG INJ IV PUSH: CPT

## 2021-08-19 PROCEDURE — 36415 COLL VENOUS BLD VENIPUNCTURE: CPT | Performed by: EMERGENCY MEDICINE

## 2021-08-19 PROCEDURE — U0005 INFEC AGEN DETEC AMPLI PROBE: HCPCS | Performed by: PHYSICIAN ASSISTANT

## 2021-08-19 PROCEDURE — 85025 COMPLETE CBC W/AUTO DIFF WBC: CPT | Performed by: EMERGENCY MEDICINE

## 2021-08-19 PROCEDURE — 70450 CT HEAD/BRAIN W/O DYE: CPT

## 2021-08-19 PROCEDURE — 99220 PR INITIAL OBSERVATION CARE/DAY 70 MINUTES: CPT | Performed by: INTERNAL MEDICINE

## 2021-08-19 PROCEDURE — 93010 ELECTROCARDIOGRAM REPORT: CPT | Performed by: INTERNAL MEDICINE

## 2021-08-19 PROCEDURE — 83036 HEMOGLOBIN GLYCOSYLATED A1C: CPT

## 2021-08-19 PROCEDURE — 71045 X-RAY EXAM CHEST 1 VIEW: CPT

## 2021-08-19 PROCEDURE — 81001 URINALYSIS AUTO W/SCOPE: CPT | Performed by: PHYSICIAN ASSISTANT

## 2021-08-19 PROCEDURE — 99285 EMERGENCY DEPT VISIT HI MDM: CPT | Performed by: PHYSICIAN ASSISTANT

## 2021-08-19 PROCEDURE — 83605 ASSAY OF LACTIC ACID: CPT | Performed by: PHYSICIAN ASSISTANT

## 2021-08-19 PROCEDURE — 84484 ASSAY OF TROPONIN QUANT: CPT | Performed by: EMERGENCY MEDICINE

## 2021-08-19 PROCEDURE — 87040 BLOOD CULTURE FOR BACTERIA: CPT | Performed by: PHYSICIAN ASSISTANT

## 2021-08-19 PROCEDURE — 93005 ELECTROCARDIOGRAM TRACING: CPT

## 2021-08-19 PROCEDURE — 83690 ASSAY OF LIPASE: CPT | Performed by: PHYSICIAN ASSISTANT

## 2021-08-19 PROCEDURE — 87086 URINE CULTURE/COLONY COUNT: CPT | Performed by: PHYSICIAN ASSISTANT

## 2021-08-19 RX ORDER — POLYETHYLENE GLYCOL 3350 17 G/17G
17 POWDER, FOR SOLUTION ORAL DAILY
Status: DISCONTINUED | OUTPATIENT
Start: 2021-08-20 | End: 2021-08-26 | Stop reason: HOSPADM

## 2021-08-19 RX ORDER — CITALOPRAM 20 MG/1
40 TABLET ORAL EVERY MORNING
Status: DISCONTINUED | OUTPATIENT
Start: 2021-08-20 | End: 2021-08-26 | Stop reason: HOSPADM

## 2021-08-19 RX ORDER — OXYBUTYNIN CHLORIDE 5 MG/1
5 TABLET, EXTENDED RELEASE ORAL DAILY
Status: DISCONTINUED | OUTPATIENT
Start: 2021-08-20 | End: 2021-08-26 | Stop reason: HOSPADM

## 2021-08-19 RX ORDER — BISACODYL 10 MG
10 SUPPOSITORY, RECTAL RECTAL DAILY PRN
Status: DISCONTINUED | OUTPATIENT
Start: 2021-08-19 | End: 2021-08-26 | Stop reason: HOSPADM

## 2021-08-19 RX ORDER — PANTOPRAZOLE SODIUM 20 MG/1
20 TABLET, DELAYED RELEASE ORAL
Status: DISCONTINUED | OUTPATIENT
Start: 2021-08-20 | End: 2021-08-26 | Stop reason: HOSPADM

## 2021-08-19 RX ORDER — POTASSIUM CHLORIDE 20 MEQ/1
40 TABLET, EXTENDED RELEASE ORAL ONCE
Status: COMPLETED | OUTPATIENT
Start: 2021-08-19 | End: 2021-08-19

## 2021-08-19 RX ORDER — BACLOFEN 10 MG/1
10 TABLET ORAL 3 TIMES DAILY
Status: DISCONTINUED | OUTPATIENT
Start: 2021-08-19 | End: 2021-08-26 | Stop reason: HOSPADM

## 2021-08-19 RX ORDER — ACETAMINOPHEN 160 MG/5ML
650 SUSPENSION, ORAL (FINAL DOSE FORM) ORAL EVERY 6 HOURS PRN
Status: DISCONTINUED | OUTPATIENT
Start: 2021-08-19 | End: 2021-08-26 | Stop reason: HOSPADM

## 2021-08-19 RX ORDER — VALPROIC ACID 250 MG/5ML
500 SOLUTION ORAL 2 TIMES DAILY
Status: DISCONTINUED | OUTPATIENT
Start: 2021-08-19 | End: 2021-08-26 | Stop reason: HOSPADM

## 2021-08-19 RX ORDER — SODIUM CHLORIDE, SODIUM GLUCONATE, SODIUM ACETATE, POTASSIUM CHLORIDE, MAGNESIUM CHLORIDE, SODIUM PHOSPHATE, DIBASIC, AND POTASSIUM PHOSPHATE .53; .5; .37; .037; .03; .012; .00082 G/100ML; G/100ML; G/100ML; G/100ML; G/100ML; G/100ML; G/100ML
75 INJECTION, SOLUTION INTRAVENOUS ONCE
Status: COMPLETED | OUTPATIENT
Start: 2021-08-19 | End: 2021-08-19

## 2021-08-19 RX ORDER — TRAZODONE HYDROCHLORIDE 100 MG/1
100 TABLET ORAL
Status: DISCONTINUED | OUTPATIENT
Start: 2021-08-19 | End: 2021-08-26 | Stop reason: HOSPADM

## 2021-08-19 RX ORDER — SODIUM CHLORIDE 9 MG/ML
75 INJECTION, SOLUTION INTRAVENOUS CONTINUOUS
Status: DISCONTINUED | OUTPATIENT
Start: 2021-08-19 | End: 2021-08-19

## 2021-08-19 RX ORDER — LEVOTHYROXINE SODIUM 0.03 MG/1
25 TABLET ORAL
Status: DISCONTINUED | OUTPATIENT
Start: 2021-08-20 | End: 2021-08-26 | Stop reason: HOSPADM

## 2021-08-19 RX ORDER — SENNOSIDES 8.6 MG
2 TABLET ORAL
Status: DISCONTINUED | OUTPATIENT
Start: 2021-08-19 | End: 2021-08-26 | Stop reason: HOSPADM

## 2021-08-19 RX ORDER — ARIPIPRAZOLE 10 MG/1
30 TABLET ORAL
Status: DISCONTINUED | OUTPATIENT
Start: 2021-08-19 | End: 2021-08-26 | Stop reason: HOSPADM

## 2021-08-19 RX ORDER — LORAZEPAM 0.5 MG/1
0.5 TABLET ORAL
Status: DISCONTINUED | OUTPATIENT
Start: 2021-08-19 | End: 2021-08-26 | Stop reason: HOSPADM

## 2021-08-19 RX ORDER — PRAVASTATIN SODIUM 40 MG
40 TABLET ORAL
Status: DISCONTINUED | OUTPATIENT
Start: 2021-08-19 | End: 2021-08-26 | Stop reason: HOSPADM

## 2021-08-19 RX ORDER — FERROUS SULFATE 325(65) MG
325 TABLET ORAL 2 TIMES DAILY WITH MEALS
Status: DISCONTINUED | OUTPATIENT
Start: 2021-08-19 | End: 2021-08-26 | Stop reason: HOSPADM

## 2021-08-19 RX ADMIN — POTASSIUM CHLORIDE 40 MEQ: 1500 TABLET, EXTENDED RELEASE ORAL at 22:14

## 2021-08-19 RX ADMIN — LORAZEPAM 0.5 MG: 0.5 TABLET ORAL at 22:13

## 2021-08-19 RX ADMIN — ARIPIPRAZOLE 30 MG: 10 TABLET ORAL at 22:37

## 2021-08-19 RX ADMIN — STANDARDIZED SENNA CONCENTRATE 17.2 MG: 8.6 TABLET ORAL at 22:14

## 2021-08-19 RX ADMIN — BACLOFEN 10 MG: 10 TABLET ORAL at 22:14

## 2021-08-19 RX ADMIN — TRAZODONE HYDROCHLORIDE 100 MG: 100 TABLET ORAL at 22:38

## 2021-08-19 RX ADMIN — CEFTRIAXONE SODIUM 1000 MG: 10 INJECTION, POWDER, FOR SOLUTION INTRAVENOUS at 16:21

## 2021-08-19 RX ADMIN — PRAVASTATIN SODIUM 40 MG: 40 TABLET ORAL at 22:14

## 2021-08-19 RX ADMIN — FERROUS SULFATE TAB 325 MG (65 MG ELEMENTAL FE) 325 MG: 325 (65 FE) TAB at 22:14

## 2021-08-19 RX ADMIN — SODIUM CHLORIDE 500 ML: 0.9 INJECTION, SOLUTION INTRAVENOUS at 11:47

## 2021-08-19 RX ADMIN — SODIUM CHLORIDE, SODIUM GLUCONATE, SODIUM ACETATE, POTASSIUM CHLORIDE, MAGNESIUM CHLORIDE, SODIUM PHOSPHATE, DIBASIC, AND POTASSIUM PHOSPHATE 75 ML/HR: .53; .5; .37; .037; .03; .012; .00082 INJECTION, SOLUTION INTRAVENOUS at 21:28

## 2021-08-19 RX ADMIN — VALPROIC ACID 500 MG: 250 SOLUTION ORAL at 22:11

## 2021-08-19 RX ADMIN — IOHEXOL 100 ML: 350 INJECTION, SOLUTION INTRAVENOUS at 12:17

## 2021-08-19 NOTE — ASSESSMENT & PLAN NOTE
Measured calcium 9 8, corrected calcium elevated at 10 7  On calcium carbonate daily at home for osteoporosis      Plan:  - holding home calcium carbonate and vitamin D

## 2021-08-19 NOTE — ASSESSMENT & PLAN NOTE
· History of recurrent UTIs  On prophylactic nitrofurantoin daily per urology  · Past urine cultures speciated to pansensitive klebsiella (9/1/2020) and enterobacter resistant to nitrofurantoin but susceptible to ceftriaxone (7/26/2020)  · Unlikely MDRO given past cultures     · Did not meet Sepsis criteria on admission: , RR 20, WBC 8 59 w/o bandemia, afebrile   · Lactate elevated at 3 8 on admission    Plan:   - antibiotics: ceftriaxone   - fluids: NS @ 75 cc/hr   - holding prophylactic nitrofurantoin   - follow up repeat lactic acid   - follow up urine and blood cultures

## 2021-08-19 NOTE — ED PROVIDER NOTES
History  Chief Complaint   Patient presents with    Altered Mental Status     caregiver reports pt is not following commands like she usually does  Pt is nonverbal at baseline  cargiver suspects pt may have aspiration pneumonia     The patient is a 82-year-old female with extensive past medical history including cerebral palsy and who is nonverbal at baseline presents to the emergency department with 1 of her caretakers for altered mental status  Per the caretaker, they began noticing the patient was not acting like herself on to stay  She states over the last couple of days, his worsen  She states the patient is generally feisty and will follow commands, however she has not been baseline  She states that the patient is vaccinated against COVID-19  They are concerned that the patient may have a UTI and/or aspiration pneumonia as she is prone to both of these things  Patient is incontinent at baseline  They additionally reports that the patient has been running a low-grade fever  Caretaker denies that patient has had shortness of breath, cough, vomiting, diarrhea or rash  History provided by:  Caregiver  History limited by:  Patient nonverbal   used: No    Altered Mental Status  Associated symptoms: fever    Associated symptoms: no abdominal pain, no headaches, no light-headedness, no nausea, no rash and no vomiting        Prior to Admission Medications   Prescriptions Last Dose Informant Patient Reported? Taking? ARIPiprazole (ABILIFY) 30 mg tablet  Care Giver No No   Sig: Take 1 tablet (30 mg) by mouth daily at 8 pm   Cholecalciferol (Vitamin D) 50 MCG (2000 UT) tablet  Care Giver No No   Sig: Take 1 tablet (2,000 Units total) by mouth daily   Citalopram Hydrobromide (CELEXA PO)  Care Giver Yes No   Sig: Take 40 mg by mouth every morning     LORazepam (ATIVAN) 0 5 mg tablet  Care Giver Yes No   Sig: Take 0 5 mg by mouth 2 (two) times a day       Oxybutynin Chloride (DITROPAN XL PO)  Care Giver Yes No   Sig: Take 5 mg by mouth 3 (three) times a day    Starch, Thickening, POWD  Care Giver No No   Sig: Take by mouth daily Use on food and liquid as directed   Vitamins A & D (VITAMIN A & D) ointment  Care Giver Yes No   Sig: Apply topically as needed for dry skin   acetaminophen (TYLENOL) 325 mg tablet  Care Giver No No   Sig: TAKE 2 TABLETS BY MOUTH (650MG) EVERY 6 HOURS AS NEEDED FOR PAIN / FEVER GREATER THAN 100 4   baclofen 10 mg tablet  Care Giver No No   Sig: Take 1 tablet (10 mg total) by mouth 3 (three) times a day   benzonatate (TESSALON PERLES) 100 mg capsule  Care Giver No No   Sig: Take 1 capsule (100 mg total) by mouth every 8 (eight) hours as needed for cough   calcium carbonate (OS-MORRIS) 600 MG tablet  Care Giver No No   Sig: Take 1 tablet (600 mg total) by mouth 2 (two) times a day with meals   carbamide peroxide (DEBROX) 6 5 % otic solution  Care Giver Yes No   Sig: Administer 2 drops into both ears daily as needed 2 drops in the affected ear prn x 5 days   carboxymethylcellulose (REFRESH TEARS) 0 5 % SOLN  Care Giver Yes No   Si drop 3 (three) times a day as needed for dry eyes   denosumab (PROLIA) 60 mg/mL   No No   Sig: Inject 1 mL (60 mg total) under the skin once for 1 dose   docosanol (ABREVA) 10 %  Care Giver No No   Sig: Apply topically 5 (five) times a day Apply and rub in 5x a day until healed as needed for lesion   ferrous sulfate 324 (65 Fe) mg  Care Giver Yes No   Sig: Take 325 mg by mouth 2 (two) times a day     fexofenadine (ALLEGRA) 180 MG tablet  Care Giver Yes No   Sig: Take 180 mg by mouth as needed     fluticasone (FLONASE) 50 mcg/act nasal spray  Care Giver No No   Si spray into each nostril daily as needed for rhinitis or allergies   guaiFENesin (ROBITUSSIN) 100 MG/5ML oral liquid  Care Giver Yes No   Sig: Take 200 mg by mouth every 8 (eight) hours as needed 2 tsp every 8 hours as needed for cough   hydrocortisone 1 % cream  Care Giver No No   Sig: Apply topically to affected area twice daily as needed for rash   levothyroxine (SYNTHROID) 25 mcg tablet  Care Giver No No   Sig: Take 1 tablet (25 mcg) by mouth every morning   linaGLIPtin 5 MG TABS   No No   Sig: Take 5 mg by mouth daily   metFORMIN (GLUCOPHAGE) 1000 MG tablet  Care Giver No No   Sig: Take 1 tablet (1,000 mg total) by mouth 2 (two) times a day with meals   neomycin-bacitracin-polymyxin b (NEOSPORIN) ointment  Care Giver Yes No   Sig: Apply 1 application topically 2 (two) times a day as needed   nitrofurantoin (MACRODANTIN) 50 mg capsule  Care Giver Yes No   Sig: Take 50 mg by mouth daily at bedtime   olopatadine HCl (PATADAY) 0 2 % opth drops  Care Giver Yes No   Sig: Administer 1 drop to both eyes as needed   omeprazole (PriLOSEC) 20 mg delayed release capsule   No No   Sig: Take 1 capsule (20 mg total) by mouth 2 (two) times a day before meals   polyethylene glycol (GLYCOLAX) 17 GM/SCOOP powder  Care Giver No No   Sig: DISSOLVE 17GM (1 CAPFUL) IN 8 OZ FLUIDS AND CONSUME BY MOUTH EVERY MORNING *HOLD 1 DAY FOR LOOSE STOOLS* (CONSTIPATION)   simvastatin (ZOCOR) 20 mg tablet  Care Giver No No   Sig: Take 1 tablet (20 mg total) by mouth daily with dinner at 4 pm for hyperlipidemia   traZODone (DESYREL) 100 mg tablet  Care Giver No No   Sig: Take 1 tablet (100 mg total) by mouth daily at  8 pm for depression   valACYclovir (VALTREX) 1,000 mg tablet   No No   Sig: Take 1 tablet (1,000 mg total) by mouth 3 (three) times a day for 7 days   valproic acid (DEPAKENE) 250 MG/5ML soln   No No   Sig: Take 10 mL (500 mg total) by mouth 2 (two) times a day      Facility-Administered Medications: None       Past Medical History:   Diagnosis Date    Adjustment disorder     Altered mental status 12/11/2015    Anemia     Bipolar 1 disorder (HCC)     Cerebral palsy (HCC)     Chronic hypernatremia 2/6/2016    Closed fracture of left hip (HCC) 1/19/2016    Closed left hip fracture (HCC)     no surgery    Constipation     Dehydration 2/20/2016    Diabetes mellitus ([facility] [address] )     Disease of thyroid gland     Diverticulosis     Fracture of multiple ribs of right side     Hip fracture (HCC) 07/26/2015    left    Hyperlipidemia     Hypernatremia 12/28/2018    Hypotension     Impulse control disorder     Incontinence     Intellectual disability due to developmental disorder, unspecified     Microalbuminuria     Osteopathia     Osteoporosis     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HCC)     Thrombocytopenia ([facility] [address] ) 7/31/2015       Past Surgical History:   Procedure Laterality Date    ABDOMINAL SURGERY      COLECTOMY MIN      re-anastomosis 7/22/16    COLONOSCOPY N/A 7/21/2016    Procedure: COLONOSCOPY;  Surgeon: Davion Vang MD;  [facility]; Service:     COLONOSCOPY N/A 1/31/2016    Procedure: COLONOSCOPY;  Surgeon: Davion Vang MD;  [facility];  Service:     COLONOSCOPY N/A 7/25/2017    Procedure: COLONOSCOPY;  Surgeon: Davion Vang MD;  [facility]; Service: Colorectal    COLOSTOMY      EXPLORATORY LAPAROTOMY W/ BOWEL RESECTION N/A 1/31/2016    Procedure: exploratory laparotomy, left sigmoidectomy, coloproctostomy, take down splenic flexure, loop colostomy;  Surgeon: Davion Vang MD;  [facility];  Service:     UT CLOSE ENTEROSTOMY N/A 7/22/2016    Procedure: SEGMENTAL COLECTOMY WITH COLOCOLOSTOMY;  Surgeon: Davion Vang MD;  [facility];  Service: Colorectal    UPPER GASTROINTESTINAL ENDOSCOPY         Family History   Problem Relation Age of Onset    Alcohol abuse Mother     Alcohol abuse Father     Diabetes Sister     No Known Problems Sister     No Known Problems Sister      I have reviewed and agree with the history as documented      E-Cigarette/Vaping    E-Cigarette Use Never User      E-Cigarette/Vaping Substances    Nicotine No     THC No     CBD No     Flavoring No     Other No     Unknown No      Social History     Tobacco Use    Smoking status: Never Smoker    Smokeless tobacco: Never Used   Vaping Use    Vaping Use: Never used   Substance Use Topics    Alcohol use: No    Drug use: No       Review of Systems   Constitutional: Positive for activity change and fever  Negative for chills  HENT: Negative for ear pain and sore throat  Eyes: Negative for redness and visual disturbance  Respiratory: Negative for cough and shortness of breath  Cardiovascular: Negative for chest pain  Gastrointestinal: Negative for abdominal pain, diarrhea, nausea and vomiting  Genitourinary: Negative for dysuria and hematuria  Musculoskeletal: Negative for back pain, neck pain and neck stiffness  Skin: Negative for color change and rash  Neurological: Negative for dizziness, light-headedness and headaches  All other systems reviewed and are negative  Physical Exam  Physical Exam  Vitals and nursing note reviewed  Constitutional:       General: She is not in acute distress  Appearance: She is well-developed  She is not ill-appearing or toxic-appearing  HENT:      Head: Normocephalic and atraumatic  Mouth/Throat:      Pharynx: Uvula midline  Eyes:      General: Lids are normal       Conjunctiva/sclera: Conjunctivae normal    Cardiovascular:      Rate and Rhythm: Normal rate and regular rhythm  Pulses: Normal pulses  Heart sounds: Normal heart sounds  Pulmonary:      Effort: Pulmonary effort is normal       Breath sounds: Normal breath sounds  Abdominal:      General: There is no distension  Palpations: Abdomen is soft  Tenderness: There is generalized abdominal tenderness  Comments: Patient appears to wince when I push on her abdomen  Musculoskeletal:      Cervical back: Normal range of motion and neck supple  Right lower leg: No edema  Left lower leg: No edema  Skin:     General: Skin is warm and dry  Neurological:      Mental Status: She is alert        Comments: Patient is awake and alert  She is nonverbal at baseline  Vital Signs  ED Triage Vitals   Temperature Pulse Respirations Blood Pressure SpO2   08/19/21 1013 08/19/21 1013 08/19/21 1013 08/19/21 1013 08/19/21 1021   97 6 °F (36 4 °C) 102 20 (!) 180/64 95 %      Temp Source Heart Rate Source Patient Position - Orthostatic VS BP Location FiO2 (%)   08/19/21 1013 08/19/21 1013 08/19/21 1013 08/19/21 1013 --   Axillary Monitor Lying Right leg       Pain Score       08/19/21 1400       No Pain           Vitals:    08/19/21 1400 08/19/21 1544 08/19/21 1600 08/19/21 1630   BP: 110/70 120/64 139/62 118/64   Pulse: 80 81 82 78   Patient Position - Orthostatic VS:  Lying           Visual Acuity      ED Medications  Medications   sodium chloride 0 9 % bolus 500 mL (0 mL Intravenous Stopped 8/19/21 1247)   iohexol (OMNIPAQUE) 350 MG/ML injection (SINGLE-DOSE) 100 mL (100 mL Intravenous Given 8/19/21 1217)   ceftriaxone (ROCEPHIN) 1 g/50 mL in dextrose IVPB (1,000 mg Intravenous New Bag 8/19/21 1621)       Diagnostic Studies  Results Reviewed     Procedure Component Value Units Date/Time    Lactic acid [[id number]] Collected: 08/19/21 1541    Lab Status: In process Specimen: Blood from Arm, Left Updated: 08/19/21 1555    Blood culture #2 [[id number]] Collected: 08/19/21 1540    Lab Status: In process Specimen: Blood from Arm, Left Updated: 08/19/21 1555    Blood culture #1 [[id number]] Collected: 08/19/21 1550    Lab Status: In process Specimen: Blood from Arm, Right Updated: 08/19/21 1555    Urine Microscopic [[id number]]  (Abnormal) Collected: 08/19/21 1356    Lab Status: Final result Specimen: Urine, Other Updated: 08/19/21 1506     RBC, UA 4-10 /hpf      WBC, UA Innumerable /hpf      Epithelial Cells Moderate /hpf      Bacteria, UA Innumerable /hpf     Urine culture [[id number]] Collected: 08/19/21 1356    Lab Status:  In process Specimen: Urine, Other Updated: 08/19/21 1506    UA w Reflex to Microscopic w Reflex to Culture [[id number]]  (Abnormal) Collected: 08/19/21 1356    Lab Status: Final result Specimen: Urine, Other Updated: 08/19/21 1424     Color, UA Light Yellow     Clarity, UA Turbid     Specific [place], UA 1 010     pH, UA 6 0     Leukocytes, UA Moderate     Nitrite, UA Negative     Protein, UA Negative mg/dl      Glucose,  (1/4%) mg/dl      Ketones, UA Negative mg/dl      Urobilinogen, UA 0 2 E U /dl      Bilirubin, UA Negative     Blood, UA Trace-Intact    Novel Coronavirus (Covid-19),PCR [facility] - 2 Hour Stat [[id number]]  (Normal) Collected: 08/19/21 1147    Lab Status: Final result Specimen: Nares from Nose Updated: 08/19/21 1251     SARS-CoV-2 Negative    Narrative: The specimen collection materials, transport medium, and/or testing methodology utilized in the production of these test results have been proven to be reliable in a limited validation with an abbreviated program under the Emergency Utilization Authorization provided by the FDA  Testing reported as "Presumptive positive" will be confirmed with secondary testing to ensure result accuracy  Clinical caution and judgement should be used with the interpretation of these results with consideration of the clinical impression and other laboratory testing  Testing reported as "Positive" or "Negative" has been proven to be accurate according to standard laboratory validation requirements  All testing is performed with control materials showing appropriate reactivity at standard intervals  [place] draw [[id number]] Collected: 08/19/21 1029    Lab Status: Final result Specimen: Blood from Arm, Right Updated: 08/19/21 1201    Narrative: The following orders were created for panel order [place] draw    Procedure                               Abnormality         Status                     ---------                               -----------         ------                     Cathryne Friendly top on [facility][[id number]]                                 Final result                 Please view results for these tests on the individual orders  TSH [[id number]]  (Normal) Collected: 08/19/21 1029    Lab Status: Final result Specimen: Blood from Arm, Right Updated: 08/19/21 1142     TSH 3RD GENERATON 2 184 uIU/mL     Narrative:      Patients undergoing fluorescein dye angiography may retain small amounts of fluorescein in the body for 48-72 hours post procedure  Samples containing fluorescein can produce falsely depressed TSH values  If the patient had this procedure,a specimen should be resubmitted post fluorescein clearance        Lipase [[id number]]  (Normal) Collected: 08/19/21 1029    Lab Status: Final result Specimen: Blood from Arm, Right Updated: 08/19/21 1142     Lipase 173 u/L     Troponin I [[id number]]  (Normal) Collected: 08/19/21 1029    Lab Status: Final result Specimen: Blood from Arm, Right Updated: 08/19/21 1129     Troponin I <0 02 ng/mL     Comprehensive metabolic panel [[id number]]  (Abnormal) Collected: 08/19/21 1029    Lab Status: Final result Specimen: Blood from Arm, Right Updated: 08/19/21 1123     Sodium 144 mmol/L      Potassium 3 2 mmol/L      Chloride 103 mmol/L      CO2 32 mmol/L      ANION GAP 9 mmol/L      BUN 11 mg/dL      Creatinine 1 05 mg/dL      Glucose 185 mg/dL      Calcium 9 8 mg/dL      Corrected Calcium 10 7 mg/dL      AST 8 U/L      ALT 11 U/L      Alkaline Phosphatase 57 U/L      Total Protein 6 9 g/dL      Albumin 2 9 g/dL      Total Bilirubin 0 17 mg/dL      eGFR 58 ml/min/1 73sq m     Narrative:      [facility] guidelines for Chronic Kidney Disease (CKD):     Stage 1 with normal or high GFR (GFR > 90 mL/min/1 73 square meters)    Stage 2 Mild CKD (GFR = 60-89 mL/min/1 73 square meters)    Stage 3A Moderate CKD (GFR = 45-59 mL/min/1 73 square meters)    Stage 3B Moderate CKD (GFR = 30-44 mL/min/1 73 square meters)    Stage 4 Severe CKD (GFR = 15-29 mL/min/1 73 square meters)    Stage 5 End Stage CKD (GFR <15 mL/min/1 73 square meters)  Note: GFR calculation is accurate only with a steady state creatinine    CBC and differential [[id number]]  (Abnormal) Collected: 08/19/21 1029    Lab Status: Final result Specimen: Blood from Arm, Right Updated: 08/19/21 1056     WBC 8 59 Thousand/uL      RBC 3 40 Million/uL      Hemoglobin 11 8 g/dL      Hematocrit 36 8 %       fL      MCH 34 7 pg      MCHC 32 1 g/dL      RDW 13 2 %      MPV 12 2 fL      Platelets 804 Thousands/uL      nRBC 0 /100 WBCs      Neutrophils Relative 62 %      Immat GRANS % 1 %      Lymphocytes Relative 26 %      Monocytes Relative 8 %      Eosinophils Relative 3 %      Basophils Relative 0 %      Neutrophils Absolute 5 31 Thousands/µL      Immature Grans Absolute 0 05 Thousand/uL      Lymphocytes Absolute 2 25 Thousands/µL      Monocytes Absolute 0 72 Thousand/µL      Eosinophils Absolute 0 23 Thousand/µL      Basophils Absolute 0 03 Thousands/µL     Fingerstick Glucose (POCT) [[id number]]  (Abnormal) Collected: 08/19/21 1029    Lab Status: Final result Updated: 08/19/21 1031     POC Glucose 191 mg/dl                  CT head without contrast   Final Result by Luisa Gayle MD (08/19 1308)      No acute intracranial abnormality  Workstation performed: [id number]         CT abdomen pelvis with contrast   Final Result by Fabienne Bear MD (08/19 1319)      Evidence of significant fecal stasis with probable impaction in the rectum  There is distention of the left colon probably related to this distal obstruction whereas the right colon is not as distended nor is there is small bowel distention  Bibasilar airspace opacities are similar to the prior CT and may be related atelectasis  Mild bladder wall thickening could be related to inflammation and should be correlated with urinalysis  The study was marked in [facility] for immediate notification           Workstation performed: [id number]         XR chest 1 view portable   Final Result by Masha Parnell MD (08/19 1136)      No acute cardiopulmonary disease  Workstation performed: [id number]                    Procedures  Procedures         ED Course  ED Course as of Aug 19 1741   Thu Aug 19, 2021   1159 Patient is resting comfortably  Vital signs remained stable  Updated caretaker on results received thus far  1330 SARS-COV-2: Negative   1359 Patient continues to rest comfortably  Pending UA  ED tech was asked to collect urine and send to lab       1532 Bacteria, UA(!): Innumerable   1532 Rocephin ordered  1532 Patient to be admitted  SBIRT 20yo+      Most Recent Value   SBIRT (22 yo +)   In order to provide better care to our patients, we are screening all of our patients for alcohol and drug use  Would it be okay to ask you these screening questions? Unable to answer at this time Filed at: 08/19/2021 1100                    MDM  Number of Diagnoses or Management Options  Altered mental status: new and requires workup  Constipation: new and requires workup  UTI (urinary tract infection): new and requires workup  Diagnosis management comments: Patient presents for evaluation of altered mental status since Tuesday  Per the caretakers, this generally happens when the patient either has aspiration pneumonia or UTI  Differential includes but is not limited to COVID-19 versus pneumonia versus UTI versus intra-abdominal pathology  Labs, imaging and fluids ordered  Labs reviewed  Patient does UA findings consistent with urinary tract infection at this time  Labs are otherwise largely unremarkable  Imaging is consistent with possible UTI with bladder wall thickening  Additionally, they note a significant amount of constipation and some possible fecal impaction  Per the caretaker, this is not unusual for the patient  She did last have a bowel movement yesterday      Due to the reported altered mental status in the setting of infection, the decision was ultimately made to admit the patient for IV antibiotics  A dose of IV Rocephin was ordered in the emergency department  Additionally, blood cultures, urine culture and lactic acid were sent  Caretaker is agreeable to admission  Case was discussed with LINDA who agreed to accept the patient under the service of Dr Lela Jones  Patient is stable for admission  Amount and/or Complexity of Data Reviewed  Clinical lab tests: ordered and reviewed  Tests in the radiology section of CPT®: ordered and reviewed  Decide to obtain previous medical records or to obtain history from someone other than the patient: yes  Obtain history from someone other than the patient: yes  Review and summarize past medical records: yes    Risk of Complications, Morbidity, and/or Mortality  Presenting problems: moderate  Diagnostic procedures: moderate  Management options: moderate    Patient Progress  Patient progress: stable      Disposition  Final diagnoses: Altered mental status   UTI (urinary tract infection)   Constipation     Time reflects when diagnosis was documented in both MDM as applicable and the Disposition within this note     Time User Action Codes Description Comment    8/19/2021  4:34 PM Bigg Franklin Add [R41 82] Altered mental status     8/19/2021  4:34 PM Rachel Haskins Add [N39 0] UTI (urinary tract infection)     8/19/2021  4:34 PM Bigg Franklin Add [K59 00] Constipation       ED Disposition     ED Disposition Condition Date/Time Comment    Admit Stable Thu Aug 19, 2021  4:34 PM Case was discussed with LINDA and the patient's admission status was agreed to be Admission Status: observation status to the service of Dr Lela Jones   Follow-up Information    None         Patient's Medications   Discharge Prescriptions    No medications on file     No discharge procedures on file      PDMP Review       Value Time User    PDMP Reviewed  Yes 9/1/2020  3:35 PM Finn Avalos MD          ED Provider  Electronically Signed by           [place], PA-C  08/19/21 7244

## 2021-08-19 NOTE — ASSESSMENT & PLAN NOTE
Lab Results   Component Value Date    HGBA1C 7 8 (H) 05/26/2021       Recent Labs     08/19/21  1029   POCGLU 191*       Blood Sugar Average: Last 72 hrs:  (P) 191     Non-insulin dependent  Home meds metformin 1000mg daily, linagliptin 5mg daily (started on 6/25/21 for recurrent hyperglycemia)       Plan:  - SSI   - repeat A1C   - hypoglycemic protocol   - diabetic diet level 2

## 2021-08-19 NOTE — ASSESSMENT & PLAN NOTE
CT A/P w/ contrast (8/19): Evidence of significant fecal stasis with probable impaction in the rectum  There is distention of the left colon probably related to this distal obstruction whereas the right colon is not as distended nor is there is small bowel distention  Per caretaker, she did have four bowel movements yesterday that were not fully formed       Plan:  - continue home Miralax 17g daily   - senna scheduled while inpatient   - ducolax suppository once tonight

## 2021-08-19 NOTE — H&P
Magda [address] 1960, 63 y o  female MRN: [id number]  Unit/Bed#: ED 26 Encounter: [id number]  Primary Care Provider: Mark Peng DO   Date and time admitted to hospital: 8/19/2021 10:07 AM    * UTI (urinary tract infection)  Assessment & Plan  · History of recurrent UTIs  On prophylactic nitrofurantoin daily per urology  · Past urine cultures speciated to pansensitive klebsiella (9/1/2020) and enterobacter resistant to nitrofurantoin but susceptible to ceftriaxone (7/26/2020)  · Unlikely MDRO given past cultures  · Did not meet Sepsis criteria on admission: , RR 20, WBC 8 59 w/o bandemia, afebrile   · Lactate elevated at 3 8 on admission    Plan:   - antibiotics: ceftriaxone   - fluids: NS @ 75 cc/hr   - holding prophylactic nitrofurantoin   - follow up repeat lactic acid   - follow up urine and blood cultures     Constipation  Assessment & Plan  CT A/P w/ contrast (8/19): Evidence of significant fecal stasis with probable impaction in the rectum  There is distention of the left colon probably related to this distal obstruction whereas the right colon is not as distended nor is there is small bowel distention  Per caretaker, she did have four bowel movements yesterday that were not fully formed  Plan:  - continue home Miralax 17g daily   - senna scheduled while inpatient   - ducolax suppository once tonight     Type 2 diabetes mellitus, without long-term current use of insulin [facility])  Assessment & Plan  Lab Results   Component Value Date    HGBA1C 7 8 (H) 05/26/2021       Recent Labs     08/19/21  1029   POCGLU 191*       Blood Sugar Average: Last 72 hrs:  (P) 191     Non-insulin dependent  Home meds metformin 1000mg daily, linagliptin 5mg daily (started on 6/25/21 for recurrent hyperglycemia)       Plan:  - SSI   - repeat A1C   - hypoglycemic protocol   - diabetic diet level 2     Hypokalemia  Assessment & Plan  Potassium 3 2 on admission    Plan: - repleted 40 mEq PO   - follow up am BMP     Dysphagia  Assessment & Plan  · Moderate oral/pharyngeal dysphagia  Follows with GI    · EGD 10/26/20 with moderate gastritis and no esophageal stricture/narrowing or web seen  Pathology negative for h pylori, intestinal metaplasia, and EOE  · Honey thickened liquid diet per prior speech evaluation  Plan:   - diet: dysphagia 1 pureed, honey thick liquid; diabetic diet carb controlled level 2   - medications crushed     Hypercalcemia  Assessment & Plan  Measured calcium 9 8, corrected calcium elevated at 10 7  On calcium carbonate daily at home for osteoporosis  Plan:  - holding home calcium carbonate and vitamin D     Hypothyroidism  Assessment & Plan  TSH WNL  Continue home levothyroxine 25mcg daily     Cerebral palsy [facility])  Assessment & Plan  Patient largely non-verbal at baseline     Bipolar disorder [facility])  Assessment & Plan  Continue home medication regimen:  - abilify 30mg at bedtime  - ativan 0 5mg at bedtime   - citalopram 40mg daily   - valproic acid 500mg BID   - trazodone 100mg at bedtime     Hyperlipidemia  Assessment & Plan  Lipid panel April 2021 WNL   Continue statin     Gastroesophageal reflux disease  Assessment & Plan  Continue home PPI     VTE Pharmacologic Prophylaxis: VTE Score: 4 Moderate Risk (Score 3-4) - Pharmacological DVT Prophylaxis Ordered: enoxaparin (Lovenox)  Code Status: Level 1 - Full Code   Discussion with family: Updated  Kiersten Knight, long term care giver from group facility) at bedside  Anticipated Length of Stay: Patient will be admitted on an observation basis with an anticipated length of stay of less than 2 midnights secondary to early presentation of UTI      Chief Complaint: Decreased activity level per primary caretaker at long term care facility     History of Present Illness:  Will Rachel is a 63 y o  female with a PMH of cerebral palsy nonverbal at baseline, frequent UTIs, type 2 diabetes non-insulin dependent, dysphagia, bipolar disorder, HLD, GERD who presents from her long-term care facility with concerns for decreased activity level and concern for recurrent UTI per primary caretaker from long term care [facility]  Of note, the care taker Sonyanatalie Martinez appears to be very attentive to the patient and states that she has been working with the patient for the last 13 years  Per caretaker at bedside, the patient has had a decline in activities since this past Tuesday  She has been less responsive to the caretaker, which includes a decrease in her ability to follow simple commands and a decrease in her activity level  She is nonambulatory at baseline, however at baseline she participates in tasks such as feeding herself, but has been unable to do so for the past few days  Caretaker also notes that the patient has been increasingly tired, sleeping significantly throughout the day yesterday  Per the caretaker, the patient's temperature normally runs between 95-96 degrees F, fiber she has had repeated temperature measurement of 98, 99° F over the past few days  Additionally, the patient had some episodes of tachycardia to the 110s yesterday night  Has not noted any changes in her urinary habits or quality of urine  She had 4 bowel movements yesterday  Did not note any aspiration events with meals over the past few days  No respiratory symptoms and the patient's lungs have been clear on exam while at the facility  O2 saturations have been normal  Does not appear to be in any pain or discomfort  The caretaker just felt that the patient "was off" and given her history of recurrent UTIs resulting in hospital admission, she felt compelled to bring the patient to the ED for thorough evaluation  In the event that consent for treatment is needed, please contact both the patient's medical power of , Nevaeh Cotto at [phone] (cell) or [phone] (work)   Additionally, the caregiver Sonya Martinez requests a call to the facility in the event the POA needs to be contacted, as the POA is newly assigned to the patient  Group residence is [facility], [phone]  Review of Systems: per primary care taker Lisa   Review of Systems   Constitutional: Positive for activity change  Negative for appetite change, diaphoresis, fever and unexpected weight change  HENT: Negative for congestion, drooling, ear discharge and rhinorrhea  Eyes: Negative for discharge  Respiratory: Negative for cough, choking, wheezing and stridor  Cardiovascular: Negative for leg swelling  Gastrointestinal: Negative for blood in stool, constipation, diarrhea and vomiting  Genitourinary: Negative for dysuria and frequency  Skin: Negative for rash  Neurological: Negative for syncope  Psychiatric/Behavioral: Negative for agitation and behavioral problems         Past Medical and Surgical History:   Past Medical History:   Diagnosis Date    Adjustment disorder     Altered mental status 12/11/2015    Anemia     Bipolar 1 disorder (HCC)     [facility]     Chronic hypernatremia 2/6/2016    [facility] of [facility] 1/19/2016    Closed left hip fracture (HCC)     no surgery    Constipation     Dehydration 2/20/2016    Diabetes mellitus ([facility]     Disease of thyroid gland     Diverticulosis     Fracture of multiple ribs of right side     Hip fracture ([facility] ) 07/26/2015    left    Hyperlipidemia     Hypernatremia 12/28/2018    Hypotension     Impulse control disorder     Incontinence     Intellectual disability due to developmental disorder, unspecified     Microalbuminuria     Osteopathia     Osteoporosis     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[facility]     [facility]rombocytopenia [facility] 7/31/2015       Past Surgical History:   Procedure Laterality Date    ABDOMINAL SURGERY      COLECTOMY MIN      re-anastomosis 7/22/16    COLONOSCOPY N/A 7/21/2016    Procedure: COLONOSCOPY;  Surgeon: Teddy Craft MD; [facility]; Service:     COLONOSCOPY N/A 1/31/2016    Procedure: COLONOSCOPY;  Surgeon: Elie Rashid MD;  [facility];  Service:     COLONOSCOPY N/A 7/25/2017    Procedure: COLONOSCOPY;  Surgeon: Elie Rashid MD;  [facility]; Service: Colorectal    COLOSTOMY      EXPLORATORY LAPAROTOMY W/ BOWEL RESECTION N/A 1/31/2016    Procedure: exploratory laparotomy, left sigmoidectomy, coloproctostomy, take down splenic flexure, loop colostomy;  Surgeon: Elie Rashid MD;  [facility];  Service:     RI CLOSE ENTEROSTOMY N/A 7/22/2016    Procedure: SEGMENTAL COLECTOMY WITH COLOCOLOSTOMY;  Surgeon: Elie Rashid MD;  [facility];  Service: Colorectal    UPPER GASTROINTESTINAL ENDOSCOPY         Meds/Allergies:  Prior to Admission medications    Medication Sig Start Date End Date Taking?  Authorizing Provider   acetaminophen (TYLENOL) 325 mg tablet TAKE 2 TABLETS BY MOUTH (650MG) EVERY 6 HOURS AS NEEDED FOR PAIN / FEVER GREATER THAN 100 4 6/11/21   Sofie Frank DO   ARIPiprazole (ABILIFY) 30 mg tablet Take 1 tablet (30 mg) by mouth daily at 8 pm 10/2/18   Mechelle Alvarez MD   baclofen 10 mg tablet Take 1 tablet (10 mg total) by mouth 3 (three) times a day 10/7/20   Mahin Vance MD   benzonatate (TESSALON PERLES) 100 mg capsule Take 1 capsule (100 mg total) by mouth every 8 (eight) hours as needed for cough 12/22/18   Lizzy Babb MD   calcium carbonate (OS-MORRIS) 600 MG tablet Take 1 tablet (600 mg total) by mouth 2 (two) times a day with meals 6/11/20   Shane Aguilar MD   carbamide peroxide (DEBROX) 6 5 % otic solution Administer 2 drops into both ears daily as needed 2 drops in the affected ear prn x 5 days    Historical Provider, MD   carboxymethylcellulose (REFRESH TEARS) 0 5 % SOLN 1 drop 3 (three) times a day as needed for dry eyes    Historical Provider, MD   Cholecalciferol (Vitamin D) 50 MCG (2000 UT) tablet Take 1 tablet (2,000 Units total) by mouth daily 8/6/20   Yung Segura DO   Citalopram Hydrobromide (CELEXA PO) Take 40 mg by mouth every morning      Historical Provider, MD   denosumab (PROLIA) 60 mg/mL Inject 1 mL (60 mg total) under the skin once for 1 dose 11/2/20 6/25/21  Yung Segura DO   docosanol (ABREVA) 10 % Apply topically 5 (five) times a day Apply and rub in 5x a day until healed as needed for lesion 10/2/18   Erika Olivas MD   ferrous sulfate 324 (65 Fe) mg Take 325 mg by mouth 2 (two) times a day  Historical Provider, MD   fexofenadine (ALLEGRA) 180 MG tablet Take 180 mg by mouth as needed      Historical Provider, MD   fluticasone (FLONASE) 50 mcg/act nasal spray 1 spray into each nostril daily as needed for rhinitis or allergies 4/7/20   Taya Mcfarland MD   guaiFENesin (ROBITUSSIN) 100 MG/5ML oral liquid Take 200 mg by mouth every 8 (eight) hours as needed 2 tsp every 8 hours as needed for cough    Historical Provider, MD   hydrocortisone 1 % cream Apply topically to affected area twice daily as needed for rash 6/11/20   Taya Mcfarland MD   levothyroxine (SYNTHROID) 25 mcg tablet Take 1 tablet (25 mcg) by mouth every morning 10/2/18   Erika Olivas MD   linaGLIPtin 5 MG TABS Take 5 mg by mouth daily 6/25/21 9/23/21  Betsy Cheney MD   LORazepam (ATIVAN) 0 5 mg tablet Take 0 5 mg by mouth 2 (two) times a day        Historical Provider, MD   metFORMIN (GLUCOPHAGE) 1000 MG tablet Take 1 tablet (1,000 mg total) by mouth 2 (two) times a day with meals 12/20/19   Taya Mcfarland MD   neomycin-bacitracin-polymyxin b (NEOSPORIN) ointment Apply 1 application topically 2 (two) times a day as needed    Historical Provider, MD   nitrofurantoin (MACRODANTIN) 50 mg capsule Take 50 mg by mouth daily at bedtime    Historical Provider, MD   olopatadine HCl (PATADAY) 0 2 % opth drops Administer 1 drop to both eyes as needed    Historical Provider, MD   omeprazole (PriLOSEC) 20 mg delayed release capsule Take 1 capsule (20 mg total) by mouth 2 (two) times a day before meals 11/3/20 6/25/21  Yvette Burk PA-C   Oxybutynin Chloride (DITROPAN XL PO) Take 5 mg by mouth 3 (three) times a day     Historical Provider, MD   polyethylene glycol (GLYCOLAX) 17 GM/SCOOP powder DISSOLVE 17GM (1 CAPFUL) IN 8 OZ FLUIDS AND CONSUME BY MOUTH EVERY MORNING *HOLD 1 DAY FOR LOOSE STOOLS* (CONSTIPATION) 12/4/20   Bharti Andrade DO   simvastatin (ZOCOR) 20 mg tablet Take 1 tablet (20 mg total) by mouth daily with dinner at 4 pm for hyperlipidemia 9/18/20   Justice Ma MD   Starch, Thickening, POWD Take by mouth daily Use on food and liquid as directed 11/17/20   Bharti Andrade DO   traZODone (DESYREL) 100 mg tablet Take 1 tablet (100 mg total) by mouth daily at  8 pm for depression 10/2/18   Miguelito Unger MD   valACYclovir (VALTREX) 1,000 mg tablet Take 1 tablet (1,000 mg total) by mouth 3 (three) times a day for 7 days 6/2/21 6/9/21  Marta Padgett MD   valproic acid (DEPAKENE) 250 MG/5ML soln Take 10 mL (500 mg total) by mouth 2 (two) times a day 8/5/20 6/25/21  Peter Bro MD   Vitamins A & D (VITAMIN A & D) ointment Apply topically as needed for dry skin    Historical Provider, MD     I have reviewed home medications with patient's caretaker from long term care facility      Allergies: No Known Allergies    Social History:  Marital Status: Single   Occupation: disabled   Patient Pre-hospital Living Situation: [facility]  Patient Pre-hospital Level of Mobility: non-ambulatory/bed bound  Patient Pre-hospital Diet Restrictions: diabetic diet, pureed honey thickened liquid  Substance Use History:   Social History     Substance and Sexual Activity   Alcohol Use No     Social History     Tobacco Use   Smoking Status Never Smoker   Smokeless Tobacco Never Used     Social History     Substance and Sexual Activity   Drug Use No       Family History:  Family History   Problem Relation Age of Onset    Alcohol abuse Mother     Alcohol abuse Father  Diabetes Sister     No Known Problems Sister     No Known Problems Sister      Physical Exam:     Vitals:   Blood Pressure: 111/61 (08/19/21 1800)  Pulse: 80 (08/19/21 1800)  Temperature: 97 6 °F (36 4 °C) (08/19/21 1013)  Temp Source: Axillary (08/19/21 1013)  Respirations: 16 (08/19/21 1800)  SpO2: 93 % (08/19/21 1800)    Physical Exam  Constitutional:       General: She is not in acute distress  Appearance: She is not toxic-appearing  HENT:      Head: Normocephalic and atraumatic  Mouth/Throat:      Mouth: Mucous membranes are moist       Pharynx: Oropharynx is clear  Eyes:      Extraocular Movements: Extraocular movements intact  Conjunctiva/sclera: Conjunctivae normal       Pupils: Pupils are equal, round, and reactive to light  Cardiovascular:      Rate and Rhythm: Normal rate and regular rhythm  Pulses: Normal pulses  Heart sounds: Normal heart sounds  No murmur heard  No friction rub  No gallop  Pulmonary:      Effort: Pulmonary effort is normal  No respiratory distress  Breath sounds: Normal breath sounds  No wheezing, rhonchi or rales  Abdominal:      General: Abdomen is flat  Bowel sounds are normal  There is no distension  Palpations: Abdomen is soft  Tenderness: There is no abdominal tenderness  There is no right CVA tenderness, left CVA tenderness or guarding  Musculoskeletal:      Comments: Contracted LUE  Rigidity of the bilateral upper and lower extremities     Skin:     General: Skin is warm and dry  Capillary Refill: Capillary refill takes 2 to 3 seconds  Neurological:      Mental Status: She is alert        Comments: Unable to assess fully due to non-verbal status         Additional Data:     Lab Results:  Results from last 7 days   Lab Units 08/19/21  1029   WBC Thousand/uL 8 59   HEMOGLOBIN g/dL 11 8   HEMATOCRIT % 36 8   PLATELETS Thousands/uL 143*   NEUTROS PCT % 62   LYMPHS PCT % 26   MONOS PCT % 8   EOS PCT % 3     Results from last 7 days   Lab Units 08/19/21  1029   SODIUM mmol/L 144   POTASSIUM mmol/L 3 2*   CHLORIDE mmol/L 103   CO2 mmol/L 32   BUN mg/dL 11   CREATININE mg/dL 1 05   ANION GAP mmol/L 9   CALCIUM mg/dL 9 8   ALBUMIN g/dL 2 9*   TOTAL BILIRUBIN mg/dL 0 17*   ALK PHOS U/L 57   ALT U/L 11*   AST U/L 8   GLUCOSE RANDOM mg/dL 185*         Results from last 7 days   Lab Units 08/19/21  1029   POC GLUCOSE mg/dl 191*         Results from last 7 days   Lab Units 08/19/21  1541   LACTIC ACID mmol/L 3 8*       Imaging:   CT head without contrast   Final Result by Kathy Henry MD (08/19 1308)      No acute intracranial abnormality  CT abdomen pelvis with contrast   Final Result by Maritza Arellano MD (08/19 1311)      Evidence of significant fecal stasis with probable impaction in the rectum  There is distention of the left colon probably related to this distal obstruction whereas the right colon is not as distended nor is there is small bowel distention  Bibasilar airspace opacities are similar to the prior CT and may be related atelectasis  Mild bladder wall thickening could be related to inflammation and should be correlated with urinalysis  XR chest 1 view portable   Final Result by Kirti Shukla MD (08/19 8029)      No acute cardiopulmonary disease  EKG and Other Studies Reviewed on Admission:   · EKG: NSR  HR 55     ** Please Note: This note has been constructed using a voice recognition system   **

## 2021-08-19 NOTE — ASSESSMENT & PLAN NOTE
· Moderate oral/pharyngeal dysphagia  Follows with GI    · EGD 10/26/20 with moderate gastritis and no esophageal stricture/narrowing or web seen  Pathology negative for h pylori, intestinal metaplasia, and EOE  · Honey thickened liquid diet per prior speech evaluation       Plan:   - diet: dysphagia 1 pureed, honey thick liquid; diabetic diet carb controlled level 2   - medications crushed

## 2021-08-20 LAB
ANION GAP SERPL CALCULATED.3IONS-SCNC: 2 MMOL/L (ref 4–13)
ANION GAP SERPL CALCULATED.3IONS-SCNC: 7 MMOL/L (ref 4–13)
BACTERIA UR CULT: NORMAL
BASOPHILS # BLD AUTO: 0.03 THOUSANDS/ΜL (ref 0–0.1)
BASOPHILS NFR BLD AUTO: 0 % (ref 0–1)
BUN SERPL-MCNC: 11 MG/DL (ref 5–25)
BUN SERPL-MCNC: 12 MG/DL (ref 5–25)
CALCIUM SERPL-MCNC: 8.2 MG/DL (ref 8.3–10.1)
CALCIUM SERPL-MCNC: 8.5 MG/DL (ref 8.3–10.1)
CHLORIDE SERPL-SCNC: 110 MMOL/L (ref 100–108)
CHLORIDE SERPL-SCNC: 110 MMOL/L (ref 100–108)
CO2 SERPL-SCNC: 31 MMOL/L (ref 21–32)
CO2 SERPL-SCNC: 35 MMOL/L (ref 21–32)
CREAT SERPL-MCNC: 0.89 MG/DL (ref 0.6–1.3)
CREAT SERPL-MCNC: 0.9 MG/DL (ref 0.6–1.3)
EOSINOPHIL # BLD AUTO: 0.39 THOUSAND/ΜL (ref 0–0.61)
EOSINOPHIL NFR BLD AUTO: 5 % (ref 0–6)
ERYTHROCYTE [DISTWIDTH] IN BLOOD BY AUTOMATED COUNT: 13.2 % (ref 11.6–15.1)
EST. AVERAGE GLUCOSE BLD GHB EST-MCNC: 148 MG/DL
GFR SERPL CREATININE-BSD FRML MDRD: 70 ML/MIN/1.73SQ M
GFR SERPL CREATININE-BSD FRML MDRD: 71 ML/MIN/1.73SQ M
GLUCOSE P FAST SERPL-MCNC: 95 MG/DL (ref 65–99)
GLUCOSE SERPL-MCNC: 106 MG/DL (ref 65–140)
GLUCOSE SERPL-MCNC: 108 MG/DL (ref 65–140)
GLUCOSE SERPL-MCNC: 111 MG/DL (ref 65–140)
GLUCOSE SERPL-MCNC: 166 MG/DL (ref 65–140)
GLUCOSE SERPL-MCNC: 210 MG/DL (ref 65–140)
GLUCOSE SERPL-MCNC: 95 MG/DL (ref 65–140)
HBA1C MFR BLD: 6.8 %
HCT VFR BLD AUTO: 32.2 % (ref 34.8–46.1)
HGB BLD-MCNC: 10.4 G/DL (ref 11.5–15.4)
IMM GRANULOCYTES # BLD AUTO: 0.03 THOUSAND/UL (ref 0–0.2)
IMM GRANULOCYTES NFR BLD AUTO: 0 % (ref 0–2)
LYMPHOCYTES # BLD AUTO: 3.23 THOUSANDS/ΜL (ref 0.6–4.47)
LYMPHOCYTES NFR BLD AUTO: 42 % (ref 14–44)
MCH RBC QN AUTO: 34.8 PG (ref 26.8–34.3)
MCHC RBC AUTO-ENTMCNC: 32.3 G/DL (ref 31.4–37.4)
MCV RBC AUTO: 108 FL (ref 82–98)
MONOCYTES # BLD AUTO: 0.53 THOUSAND/ΜL (ref 0.17–1.22)
MONOCYTES NFR BLD AUTO: 7 % (ref 4–12)
NEUTROPHILS # BLD AUTO: 3.53 THOUSANDS/ΜL (ref 1.85–7.62)
NEUTS SEG NFR BLD AUTO: 46 % (ref 43–75)
NRBC BLD AUTO-RTO: 0 /100 WBCS
PLATELET # BLD AUTO: 131 THOUSANDS/UL (ref 149–390)
PMV BLD AUTO: 12 FL (ref 8.9–12.7)
POTASSIUM SERPL-SCNC: 3.7 MMOL/L (ref 3.5–5.3)
POTASSIUM SERPL-SCNC: 4 MMOL/L (ref 3.5–5.3)
RBC # BLD AUTO: 2.99 MILLION/UL (ref 3.81–5.12)
SODIUM SERPL-SCNC: 147 MMOL/L (ref 136–145)
SODIUM SERPL-SCNC: 148 MMOL/L (ref 136–145)
WBC # BLD AUTO: 7.74 THOUSAND/UL (ref 4.31–10.16)

## 2021-08-20 PROCEDURE — 80048 BASIC METABOLIC PNL TOTAL CA: CPT

## 2021-08-20 PROCEDURE — 3044F HG A1C LEVEL LT 7.0%: CPT | Performed by: FAMILY MEDICINE

## 2021-08-20 PROCEDURE — 85025 COMPLETE CBC W/AUTO DIFF WBC: CPT

## 2021-08-20 PROCEDURE — 82948 REAGENT STRIP/BLOOD GLUCOSE: CPT

## 2021-08-20 PROCEDURE — 99232 SBSQ HOSP IP/OBS MODERATE 35: CPT | Performed by: INTERNAL MEDICINE

## 2021-08-20 RX ORDER — DEXTROSE MONOHYDRATE 50 MG/ML
50 INJECTION, SOLUTION INTRAVENOUS CONTINUOUS
Status: DISPENSED | OUTPATIENT
Start: 2021-08-20 | End: 2021-08-20

## 2021-08-20 RX ADMIN — POLYETHYLENE GLYCOL 3350 17 G: 17 POWDER, FOR SOLUTION ORAL at 08:58

## 2021-08-20 RX ADMIN — CEFTRIAXONE SODIUM 1000 MG: 10 INJECTION, POWDER, FOR SOLUTION INTRAVENOUS at 17:08

## 2021-08-20 RX ADMIN — FERROUS SULFATE TAB 325 MG (65 MG ELEMENTAL FE) 325 MG: 325 (65 FE) TAB at 09:00

## 2021-08-20 RX ADMIN — INSULIN LISPRO 1 UNITS: 100 INJECTION, SOLUTION INTRAVENOUS; SUBCUTANEOUS at 11:25

## 2021-08-20 RX ADMIN — PRAVASTATIN SODIUM 40 MG: 40 TABLET ORAL at 17:08

## 2021-08-20 RX ADMIN — TRAZODONE HYDROCHLORIDE 100 MG: 100 TABLET ORAL at 21:25

## 2021-08-20 RX ADMIN — STANDARDIZED SENNA CONCENTRATE 17.2 MG: 8.6 TABLET ORAL at 21:26

## 2021-08-20 RX ADMIN — BACLOFEN 10 MG: 10 TABLET ORAL at 21:26

## 2021-08-20 RX ADMIN — LORAZEPAM 0.5 MG: 0.5 TABLET ORAL at 21:26

## 2021-08-20 RX ADMIN — ENOXAPARIN SODIUM 40 MG: 40 INJECTION SUBCUTANEOUS at 08:58

## 2021-08-20 RX ADMIN — FERROUS SULFATE TAB 325 MG (65 MG ELEMENTAL FE) 325 MG: 325 (65 FE) TAB at 17:09

## 2021-08-20 RX ADMIN — LEVOTHYROXINE SODIUM 25 MCG: 25 TABLET ORAL at 06:55

## 2021-08-20 RX ADMIN — PANTOPRAZOLE SODIUM 20 MG: 40 TABLET, DELAYED RELEASE ORAL at 06:55

## 2021-08-20 RX ADMIN — OXYBUTYNIN 5 MG: 5 TABLET, FILM COATED, EXTENDED RELEASE ORAL at 08:58

## 2021-08-20 RX ADMIN — VALPROIC ACID 500 MG: 250 SOLUTION ORAL at 17:08

## 2021-08-20 RX ADMIN — VALPROIC ACID 500 MG: 250 SOLUTION ORAL at 08:58

## 2021-08-20 RX ADMIN — BACLOFEN 10 MG: 10 TABLET ORAL at 08:58

## 2021-08-20 RX ADMIN — DEXTROSE 50 ML/HR: 5 SOLUTION INTRAVENOUS at 09:00

## 2021-08-20 RX ADMIN — CITALOPRAM HYDROBROMIDE 40 MG: 20 TABLET ORAL at 08:58

## 2021-08-20 RX ADMIN — BACLOFEN 10 MG: 10 TABLET ORAL at 17:08

## 2021-08-20 RX ADMIN — ARIPIPRAZOLE 30 MG: 10 TABLET ORAL at 21:36

## 2021-08-20 NOTE — ASSESSMENT & PLAN NOTE
Lab Results   Component Value Date    HGBA1C 6 8 (H) 08/19/2021       Recent Labs     08/19/21  1029 08/19/21 2052   POCGLU 191* 138       Blood Sugar Average: Last 72 hrs:  (P) 164 5     Non-insulin dependent  Home meds metformin 1000mg daily, linagliptin 5mg daily (started on 6/25/21 for recurrent hyperglycemia)       Plan:  - SSI   - repeat A1C   - hypoglycemic protocol   - diabetic diet level 2

## 2021-08-20 NOTE — ASSESSMENT & PLAN NOTE
CT A/P w/ contrast (8/19): Evidence of significant fecal stasis with probable impaction in the rectum  There is distention of the left colon probably related to this distal obstruction whereas the right colon is not as distended nor is there is small bowel distention  Per caretaker, she did have four bowel movements yesterday that were not fully formed       Plan:  - continue home Miralax 17g daily   - senna scheduled while inpatient   - ducolax suppository given last night

## 2021-08-20 NOTE — ASSESSMENT & PLAN NOTE
Continue home medication regimen:  - abilify 30mg at bedtime  - ativan 0 5mg at bedtime   - citalopram 40mg daily   - valproic acid 500mg BID   - trazodone 100mg at bedtime

## 2021-08-20 NOTE — UTILIZATION REVIEW
Initial Clinical Review  OBSERVATION ADMISSION 8/19/2021 1635 CONVERTED TO INPATIENT ADMISSION 8/20/2021 1314 DUE TO ONGOING MANAGEMENT OF UTI REQ IV ANTIBX, HYPERCALCEMIA & HYPOKALEMIA IN A NON VERBAL , CEREBRAL PALSY PATIENT  Admission: Date/Time/Statement:   08/20/21 1315  Inpatient Admission Once     Transfer Service: Hospitalist       Question Answer Comment   Level of Care Med Surg    Estimated length of stay Not Applicable        74/25/57 1314     ED Arrival Information     Expected Arrival Acuity    - 8/19/2021 10:03 Emergent         Means of arrival Escorted by Service Admission type    [facility] Emergency         Arrival complaint    change in mental status        Chief Complaint   Patient presents with    Altered Mental Status     caregiver reports pt is not following commands like she usually does  Pt is nonverbal at baseline  cargiver suspects pt may have aspiration pneumonia       Initial Presentation: 64 yo female PMH of  cerebral palsy nonverbal at baseline, frequent UTIs, type 2 diabetes non-insulin dependent, dysphagia, bipolar disorder, HLD, GERD to ED from [address] [place] [address] presents w decr activity level, altered mental status  & concern for UTI per PCP at [facility]   Baseline w feisty character & will follow commands, is incontinent at baseline, ambulates w heavy assist on modified diet  Per caretaker patient w decline in activities for 2 days, less responsive, increasingly tired w/ increasingly sleeping, temps ranged 98- 99F   IN ED UA concerning for UTI, hypercalcemia, hypokalemia  Admit Observation due to UTI, hypercalcemia, hypokalemia, constipation  Cont gentle IVF, IV antibx, follow urine/ bld cultures, repeat lactic acid/ BMP  Home home calcium carbonate & vit D  Attending PM update  Appears chronically ill, /63, dry mucous membranes; extremities contracted, non verbal   Hypercalcemia due to dehydration likely cont IV hydration   Senna & dulcolax for constipation  Date: 8/20/2021  Day 2:   Provider  Cont IV ceftriaxone & holding prophylactic Nitrofurantoin; follow cultures; trend BMP, SSI    Exam disoriented; w weakness; non verbal at baseline  ED Triage Vitals   Temperature Pulse Respirations Blood Pressure SpO2   08/19/21 1013 08/19/21 1013 08/19/21 1013 08/19/21 1013 08/19/21 1021   97 6 °F (36 4 °C) 102 20 (!) 180/64 95 %      Temp Source Heart Rate Source Patient Position - Orthostatic VS BP Location FiO2 (%)   08/19/21 1013 08/19/21 1013 08/19/21 1013 08/19/21 1013 --   Axillary Monitor Lying Right leg       Pain Score       08/19/21 1400       No Pain          Wt Readings from Last 1 Encounters:   08/19/21 42 6 kg (93 lb 14 7 oz)     Additional Vital Signs:   Date/Time  Temp  Pulse  Resp  BP  MAP (mmHg)  SpO2  O2 Device  Patient Position - Orthostatic VS   08/20/21 07:21:21  98 2 °F (36 8 °C)  66  18  88/56Abnormal   67  91 %  --  --   08/20/21 0500  --  67  --  90/57  --  --  --  --   08/19/21 21:34:54  --  75  --  91/60  70  91 %  --  --   08/19/21 21:24:32  98 3 °F (36 8 °C)  81  16  88/58Abnormal   68  94 %  --  --   08/19/21 19:38:23  98 3 °F (36 8 °C)  83  16  114/64  81  92 %  None (Room air)  Lying   08/19/21 1900  98 °F (36 7 °C)  84  16  109/69  83  93 %  None (Room air)  Lying   08/19/21 1800  --  80  16  111/61  80  93 %  None (Room air)  --   08/19/21 1730  --  82  17  120/56  80  94 %  None (Room air)  --   08/19/21 1630  --  78  16  118/64  81  95 %  None (Room air)  --   08/19/21 1600  --  82  17  139/62  90  95 %  None (Room air)  --   08/19/21 1544  --  81  --  120/64  --  92 %  None (Room air)  Lying   08/19/21 1400  --  80  18  110/70  --  98 %  None (Room air)  --   08/19/21 1127  --  83  --  108/64  --  95 %  None (Room air)  Lying   08/19/21 1045  --  86  20  128/58  83  95 %  None (Room air)  Lying   08/19/21 1021  --  --  --  --  --  95 %  None (Room air)  --   08/19/21 1013  97 6 °F (36 4 °C)  102  20  180/64Abnormal   --  --  --  Lying      Weights (last 14 days)    Date/Time  Weight  Weight Method  Height   08/19/21 1936  42 6 kg (93 lb 14 7 oz)  Bed scale  4' 10" (1 473 m)   08/19/21 1900  41 7 kg (92 lb)  Stated  4' 10" (1 473 m)       Pertinent Labs/Diagnostic Test Results:   Results from last 7 days   Lab Units 08/19/21  1147   SARS-COV-2  Negative     Results from last 7 days   Lab Units 08/20/21  0440 08/19/21  1029   WBC Thousand/uL 7 74 8 59   HEMOGLOBIN g/dL 10 4* 11 8   HEMATOCRIT % 32 2* 36 8   PLATELETS Thousands/uL 131* 143*   NEUTROS ABS Thousands/µL 3 53 5 31         Results from last 7 days   Lab Units 08/20/21  0440 08/19/21  1029   SODIUM mmol/L 148* 144   POTASSIUM mmol/L 4 0 3 2*   CHLORIDE mmol/L 110* 103   CO2 mmol/L 31 32   ANION GAP mmol/L 7 9   BUN mg/dL 12 11   CREATININE mg/dL 0 90 1 05   EGFR ml/min/1 73sq m 70 58   CALCIUM mg/dL 8 5 9 8     Results from last 7 days   Lab Units 08/19/21  1029   AST U/L 8   ALT U/L 11*   ALK PHOS U/L 57   TOTAL PROTEIN g/dL 6 9   ALBUMIN g/dL 2 9*   TOTAL BILIRUBIN mg/dL 0 17*     Results from last 7 days   Lab Units 08/20/21  0721 08/19/21 2052 08/19/21  1029   POC GLUCOSE mg/dl 111 138 191*     Results from last 7 days   Lab Units 08/20/21  0440 08/19/21  1029   GLUCOSE RANDOM mg/dL 95 185*         Results from last 7 days   Lab Units 08/19/21  1029   HEMOGLOBIN A1C % 6 8*   EAG mg/dl 148     No results found for: BETA-HYDROXYBUTYRATE                   Results from last 7 days   Lab Units 08/19/21  1029   TROPONIN I ng/mL <0 02             Results from last 7 days   Lab Units 08/19/21  1029   TSH 3RD GENERATON uIU/mL 2 184         Results from last 7 days   Lab Units 08/19/21  1808 08/19/21  1541   LACTIC ACID mmol/L 2 6* 3 8*                         Results from last 7 days   Lab Units 08/19/21  1029   LIPASE u/L 173             Results from last 7 days   Lab Units 08/19/21  1356   CLARITY UA  Turbid   COLOR UA  Light Yellow   SPEC GRAV UA  1 010   PH UA  6 0   GLUCOSE UA mg/dl 250 (1/4%)* KETONES UA mg/dl Negative   BLOOD UA  Trace-Intact*   PROTEIN UA mg/dl Negative   NITRITE UA  Negative   BILIRUBIN UA  Negative   UROBILINOGEN UA E U /dl 0 2   LEUKOCYTES UA  Moderate*   WBC UA /hpf Innumerable*   RBC UA /hpf 4-10*   BACTERIA UA /hpf Innumerable*   EPITHELIAL CELLS WET PREP /hpf Moderate*                                 Results from last 7 days   Lab Units 08/19/21  1550 08/19/21  1540   BLOOD CULTURE  Received in Microbiology Lab  Culture in Progress  Received in Microbiology Lab  Culture in Progress  CT head without contrast   Final Result by , MD (08/19 1308)      No acute intracranial abnormality  CT abdomen pelvis with contrast   Final Result by  MD (08/19 1319)      Evidence of significant fecal stasis with probable impaction in the rectum  There is distention of the left colon probably related to this distal obstruction whereas the right colon is not as distended nor is there is small bowel distention  Bibasilar airspace opacities are similar to the prior CT and may be related atelectasis  Mild bladder wall thickening could be related to inflammation and should be correlated with urinalysis  XR chest 1 view portable   Final Result by , MD (08/19 1131)      No acute cardiopulmonary disease       8/19 ekg nsr     ED Treatment:   Medication Administration from 08/19/2021 1002 to 08/19/2021 1932       Date/Time Order Dose Route Action     08/19/2021 1147 sodium chloride 0 9 % bolus 500 mL 500 mL Intravenous New Bag     08/19/2021 1621 ceftriaxone (ROCEPHIN) 1 g/50 mL in dextrose IVPB 1,000 mg Intravenous New Bag        Past Medical History:   Diagnosis Date    Adjustment disorder     Altered mental status 12/11/2015    Anemia     Bipolar 1 disorder (HCC)     Cerebral palsy (HCC)     Chronic hypernatremia 2/6/2016    Closed fracture of left hip ([facility] [address] ) 1/19/2016    Closed left hip fracture (HCC)     no surgery    Constipation     Dehydration 2/20/2016    Diabetes mellitus ([facility] )     Disease of thyroid gland     Diverticulosis     Fracture of multiple ribs of right side     Hip fracture ([facility]) 07/26/2015    left    Hyperlipidemia     Hypernatremia 12/28/2018    Hypotension     Impulse control disorder     Incontinence     Intellectual disability due to developmental disorder, unspecified     Microalbuminuria     Osteopathia     Osteoporosis     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[facility])     Thrombocytopenia ([facility]) 7/31/2015     Present on Admission:   Hypokalemia   Constipation   Type 2 diabetes mellitus, without long-term current use of insulin ([facility])   Dysphagia   Cerebral palsy ([facility])   Hyperlipidemia   Gastroesophageal reflux disease   Bipolar disorder ([facility] )   Hypothyroidism      Admitting Diagnosis: UTI (urinary tract infection) [N39 0]  Altered mental status [R41 82]  Constipation [K59 00]  Change in mental status [R41 82]  Age/Sex: 63 y o  female  Admission Orders:  Contact & airborne isolation  Ambulate patient     Scheduled Medications:  ARIPiprazole, 30 mg, Oral, HS  baclofen, 10 mg, Oral, TID  cefTRIAXone, 1,000 mg, Intravenous, Q24H  citalopram, 40 mg, Oral, QAM  enoxaparin, 40 mg, Subcutaneous, Q24H [facility] HOME  ferrous sulfate, 325 mg, Oral, BID With Meals  insulin lispro, 1-5 Units, Subcutaneous, TID AC  levothyroxine, 25 mcg, Oral, Early Morning  LORazepam, 0 5 mg, Oral, HS  oxybutynin, 5 mg, Oral, Daily  pantoprazole, 20 mg, Oral, BID AC  polyethylene glycol, 17 g, Oral, Daily  pravastatin, 40 mg, Oral, Daily With Dinner  senna, 2 tablet, Oral, HS  traZODone, 100 mg, Oral, HS  valproic acid, 500 mg, Oral, BID      Continuous IV Infusions:  dextrose, 50 mL/hr, Intravenous, Continuous      PRN Meds:  acetaminophen, 650 mg, Oral, Q6H PRN  bisacodyl, 10 mg, Rectal, Daily PRN    Network Utilization Review Department  ATTENTION: Please call with any questions or concerns to [phone] and carefully listen to the prompts so that you are directed to the right person   All voicemails are confidential   [facility] all requests for admission clinical reviews, approved or denied determinations and any other requests to dedicated fax number below belonging to the campus where the patient is receiving treatment   List of dedicated fax numbers for the Facilities:  [address] DENIALS (Administrative/Medical Necessity) [phone]   [address] (Maternity/NICU/Pediatrics) [phone]   [address] [place] [address] [address] [place] [address] Vinod Morales [address] [facility] [phone]

## 2021-08-20 NOTE — ASSESSMENT & PLAN NOTE
· History of recurrent UTIs  On prophylactic nitrofurantoin daily per urology  · Past urine cultures speciated to pansensitive klebsiella (9/1/2020) and enterobacter resistant to nitrofurantoin but susceptible to ceftriaxone (7/26/2020)  · Unlikely MDRO given past cultures     · Did not meet Sepsis criteria on admission: , RR 20, WBC 8 59 w/o bandemia, afebrile   · Lactate elevated at 3 8 on admission    Plan:   - antibiotics: ceftriaxone   - holding prophylactic nitrofurantoin   - follow up repeat lactic acid 2 6 down from 3 8  - follow up urine and blood cultures

## 2021-08-20 NOTE — ASSESSMENT & PLAN NOTE
CT A/P w/ contrast (8/19): Evidence of significant fecal stasis with probable impaction in the rectum  There is distention of the left colon probably related to this distal obstruction whereas the right colon is not as distended nor is there is small bowel distention  Per caretaker, she did have four bowel movements yesterday that were not fully formed       Plan:  - continue home Miralax 17g daily   - senna scheduled while inpatient   - ducolax suppository given

## 2021-08-20 NOTE — PROGRESS NOTES
[address] 1960, 63 y o  female MRN: [id number]  Unit/Bed#: W -89 Encounter: [id number]  Primary Care Provider: Ga Jackson DO   Date and time admitted to hospital: 8/19/2021 10:07 AM    * UTI (urinary tract infection)  Assessment & Plan  · History of recurrent UTIs  On prophylactic nitrofurantoin daily per urology  · Past urine cultures speciated to pansensitive klebsiella (9/1/2020) and enterobacter resistant to nitrofurantoin but susceptible to ceftriaxone (7/26/2020)  · Unlikely MDRO given past cultures  · Did not meet Sepsis criteria on admission: , RR 20, WBC 8 59 w/o bandemia, afebrile   · Lactate elevated at 3 8 on admission    Plan:   - antibiotics: ceftriaxone   - holding prophylactic nitrofurantoin   - follow up repeat lactic acid 2 6 down from 3 8  - follow up urine and blood cultures     Hypokalemia  Assessment & Plan  Potassium 3 2 on admission    Plan:   - repleted 40 mEq PO  - K 4 0  Most recently   -Trend BMP     Type 2 diabetes mellitus, without long-term current use of insulin [facility])  Assessment & Plan  Lab Results   Component Value Date    HGBA1C 6 8 (H) 08/19/2021       Recent Labs     08/19/21  1029 08/19/21 2052   POCGLU 191* 138       Blood Sugar Average: Last 72 hrs:  (P) 164 5     Non-insulin dependent  Home meds metformin 1000mg daily, linagliptin 5mg daily (started on 6/25/21 for recurrent hyperglycemia)  Plan:  - SSI   - repeat A1C   - hypoglycemic protocol   - diabetic diet level 2     Hypothyroidism  Assessment & Plan  TSH WNL  Continue home levothyroxine 25mcg daily     Hyperlipidemia  Assessment & Plan  Lipid panel April 2021 WNL   Continue statin     Hypercalcemia  Assessment & Plan  Measured calcium was 9 8, corrected calcium elevated at 10 7  On calcium carbonate daily at home for osteoporosis    Measured calcium this morning was 8 5    Plan:  - holding home calcium carbonate and vitamin D Dysphagia  Assessment & Plan  · Moderate oral/pharyngeal dysphagia  Follows with GI    · EGD 10/26/20 with moderate gastritis and no esophageal stricture/narrowing or web seen  Pathology negative for h pylori, intestinal metaplasia, and EOE  · Honey thickened liquid diet per prior speech evaluation  Plan:   - diet: dysphagia 1 pureed, honey thick liquid; diabetic diet carb controlled level 2   - medications crushed     Constipation  Assessment & Plan  CT A/P w/ contrast (8/19): Evidence of significant fecal stasis with probable impaction in the rectum  There is distention of the left colon probably related to this distal obstruction whereas the right colon is not as distended nor is there is small bowel distention  Per caretaker, she did have four bowel movements yesterday that were not fully formed  Plan:  - continue home Miralax 17g daily   - senna scheduled while inpatient   - ducolax suppository given last night    Bipolar disorder ([facility] [address] )  Assessment & Plan  Continue home medication regimen:  - abilify 30mg at bedtime  - ativan 0 5mg at bedtime   - citalopram 40mg daily   - valproic acid 500mg BID   - trazodone 100mg at bedtime     Cerebral palsy [facility])  Assessment & Plan  Patient largely non-verbal at baseline     Gastroesophageal reflux disease  Assessment & Plan  Continue home PPI           VTE Pharmacologic Prophylaxis: VTE Score: 4 Moderate Risk (Score 3-4) - Pharmacological DVT Prophylaxis Ordered: enoxaparin (Lovenox)  Patient Centered Rounds: I performed bedside rounds with nursing staff today  Discussions with Specialists or Other Care Team Provider: Nursing    Education and Discussions with Family / Patient: Updated  ([address]) via phone  Current Length of Stay: 0 day(s)  Current Patient Status: Observation   Discharge Plan: Anticipate discharge in 48-72 hrs to home  Code Status: Level 1 - Full Code    Subjective:   No events overnight   Patient is nonverbal but in no observable acute distress  Sitting up in bed phonating intermittently  Talked to patient about treatment, though unclear if pt comprehended  Will follow up with  for additional collateral information surrounding her baseline mental status  Objective:     Vitals:   Temp (24hrs), Av 1 °F (36 7 °C), Min:97 6 °F (36 4 °C), Max:98 3 °F (36 8 °C)    Temp:  [97 6 °F (36 4 °C)-98 3 °F (36 8 °C)] 98 3 °F (36 8 °C)  HR:  [] 75  Resp:  [16-20] 16  BP: ()/(56-70) 91/60  SpO2:  [91 %-98 %] 91 %  Body mass index is 19 63 kg/m²  Input and Output Summary (last 24 hours): Intake/Output Summary (Last 24 hours) at 2021 8635  Last data filed at 2021 1651  Gross per 24 hour   Intake 550 ml   Output --   Net 550 ml       Physical Exam:   Physical Exam  Vitals and nursing note reviewed  Constitutional:       General: She is not in acute distress  Appearance: She is well-developed  HENT:      Head: Normocephalic and atraumatic  Eyes:      Conjunctiva/sclera: Conjunctivae normal    Cardiovascular:      Rate and Rhythm: Normal rate and regular rhythm  Heart sounds: No murmur heard  Pulmonary:      Effort: Pulmonary effort is normal  No respiratory distress  Breath sounds: Normal breath sounds  Abdominal:      Palpations: Abdomen is soft  Tenderness: There is no abdominal tenderness  Musculoskeletal:         General: Deformity present  No swelling, tenderness or signs of injury  Cervical back: Neck supple  Skin:     General: Skin is warm and dry  Capillary Refill: Capillary refill takes less than 2 seconds  Neurological:      General: No focal deficit present  Mental Status: She is alert  She is disoriented  Motor: Weakness present     Psychiatric:      Comments: Non-verbal, baseline unclear          Additional Data:     Labs:  Results from last 7 days   Lab Units 21  0440   WBC Thousand/uL 7 74   HEMOGLOBIN g/dL 10 4* HEMATOCRIT % 32 2*   PLATELETS Thousands/uL 131*   NEUTROS PCT % 46   LYMPHS PCT % 42   MONOS PCT % 7   EOS PCT % 5     Results from last 7 days   Lab Units 08/20/21  0440 08/19/21  1029   SODIUM mmol/L 148* 144   POTASSIUM mmol/L 4 0 3 2*   CHLORIDE mmol/L 110* 103   CO2 mmol/L 31 32   BUN mg/dL 12 11   CREATININE mg/dL 0 90 1 05   ANION GAP mmol/L 7 9   CALCIUM mg/dL 8 5 9 8   ALBUMIN g/dL  --  2 9*   TOTAL BILIRUBIN mg/dL  --  0 17*   ALK PHOS U/L  --  57   ALT U/L  --  11*   AST U/L  --  8   GLUCOSE RANDOM mg/dL 95 185*         Results from last 7 days   Lab Units 08/19/21  2052 08/19/21  1029   POC GLUCOSE mg/dl 138 191*     Results from last 7 days   Lab Units 08/19/21  1029   HEMOGLOBIN A1C % 6 8*     Results from last 7 days   Lab Units 08/19/21  1808 08/19/21  1541   LACTIC ACID mmol/L 2 6* 3 8*       Lines/Drains:  Invasive Devices     Peripheral Intravenous Line            Peripheral IV 08/19/21 Right Antecubital <1 day          Drain            External Urinary Catheter <1 day                  Imaging: Reviewed radiology reports from this admission including: chest xray and abdominal/pelvic CT    Recent Cultures (last 7 days):   Results from last 7 days   Lab Units 08/19/21  1550 08/19/21  1540   BLOOD CULTURE  Received in Microbiology Lab  Culture in Progress  Received in Microbiology Lab  Culture in Progress         Last 24 Hours Medication List:   Current Facility-Administered Medications   Medication Dose Route Frequency Provider Last Rate    acetaminophen  650 mg Oral Q6H PRN Elmira Stokes MD      ARIPiprazole  30 mg Oral HS Elmira Stokes MD      baclofen  10 mg Oral TID Elmira Stokes MD      bisacodyl  10 mg Rectal Daily PRN Elmira Stokes MD      cefTRIAXone  1,000 mg Intravenous Q24H Elmira Stokes MD      citalopram  40 mg Oral QAM Elmira Stokes MD      enoxaparin  40 mg Subcutaneous Q24H [address] [place] MD      ferrous sulfate  325 mg Oral BID With Meals Elmira Stokes MD      insulin lispro  1-5 Units Subcutaneous TID Faiza Roper MD      levothyroxine  25 mcg Oral Early Morning Celine Osler, MD      LORazepam  0 5 mg Oral HS Celine Osler, MD      oxybutynin  5 mg Oral Daily Celine Osler, MD      pantoprazole  20 mg Oral BID AC Celine Osler, MD      polyethylene glycol  17 g Oral Daily Celine Osler, MD      pravastatin  40 mg Oral Daily With Coy Koch MD      senna  2 tablet Oral HS Celine Osler, MD      traZODone  100 mg Oral HS Celine Osler, MD      valproic acid  500 mg Oral BID Celine Osler, MD          Today, Patient Was Seen By: Kvng Taylor MD    **Please Note: This note may have been constructed using a voice recognition system  **

## 2021-08-20 NOTE — ASSESSMENT & PLAN NOTE
Measured calcium was 9 8, corrected calcium elevated at 10 7  On calcium carbonate daily at home for osteoporosis    Measured calcium this morning was 8 5    Plan:  - holding home calcium carbonate and vitamin D

## 2021-08-20 NOTE — PLAN OF CARE
Problem: Potential for Falls  Goal: Patient will remain free of falls  Description: INTERVENTIONS:  - Educate patient/family on patient safety including physical limitations  - Instruct patient to call for assistance with activity   - Consult OT/PT to assist with strengthening/mobility   - Keep Call bell within reach  - Keep bed low and locked with side rails adjusted as appropriate  - Keep care items and personal belongings within reach  - Initiate and maintain comfort rounds  - Make Fall Risk Sign visible to staff  - Offer Toileting every Hours, in advance of need  - Initiate/Maintain alarm  - Obtain necessary fall risk management equipment:   - Apply yellow socks and bracelet for high fall risk patients  - Consider moving patient to room near nurses station  8/20/2021 0244 by Nixon Ritter RN  Outcome: Progressing  8/20/2021 0243 by Nixon Ritter RN  Outcome: Progressing  8/20/2021 0242 by Nixon Ritter RN  Outcome: Progressing     Problem: MOBILITY - ADULT  Goal: Maintain or return to baseline ADL function  Description: INTERVENTIONS:  -  Assess patient's ability to carry out ADLs; assess patient's baseline for ADL function and identify physical deficits which impact ability to perform ADLs (bathing, care of mouth/teeth, toileting, grooming, dressing, etc )  - Assess/evaluate cause of self-care deficits   - Assess range of motion  - Assess patient's mobility; develop plan if impaired  - Assess patient's need for assistive devices and provide as appropriate  - Encourage maximum independence but intervene and supervise when necessary  - Involve family in performance of ADLs  - Assess for home care needs following discharge   - Consider OT consult to assist with ADL evaluation and planning for discharge  - Provide patient education as appropriate  8/20/2021 0244 by Nixon Ritter RN  Outcome: Progressing  8/20/2021 0243 by Nixon Ritter RN  Outcome: Progressing  8/20/2021 0242 by Nixon Ritter RN  Outcome: Progressing  Goal: Maintains/Returns to pre admission functional level  Description: INTERVENTIONS:  - Perform BMAT or MOVE assessment daily    - Set and communicate daily mobility goal to care team and patient/family/caregiver  - Collaborate with rehabilitation services on mobility goals if consulted  - Perform Range of Motion  times a day  - Reposition patient every 2 hours  - Dangle patient  times a day       - Out of bed for meals  times a day  - Out of bed for toileting  - Record patient progress and toleration of activity level   8/20/2021 0244 by Gale Foss RN  Outcome: Progressing  8/20/2021 0243 by Gale Foss RN  Outcome: Progressing  8/20/2021 0242 by Gale Foss RN  Outcome: Progressing     Problem: Prexisting or High Potential for Compromised Skin Integrity  Goal: Skin integrity is maintained or improved  Description: INTERVENTIONS:  - Identify patients at risk for skin breakdown  - Assess and monitor skin integrity  - Assess and monitor nutrition and hydration status  - Monitor labs   - Assess for incontinence   - Turn and reposition patient  - Assist with mobility/ambulation  - Relieve pressure over bony prominences  - Avoid friction and shearing  - Provide appropriate hygiene as needed including keeping skin clean and dry  - Evaluate need for skin moisturizer/barrier cream  - Collaborate with interdisciplinary team   - Patient/family teaching  - Consider wound care consult   8/20/2021 0244 by Gale Foss RN  Outcome: Progressing  8/20/2021 0243 by Gale Foss RN  Outcome: Progressing  8/20/2021 0242 by Gale Foss RN  Outcome: Progressing     Problem: Nutrition/Hydration-ADULT  Goal: Nutrient/Hydration intake appropriate for improving, restoring or maintaining nutritional needs  Description: Monitor and assess patient's nutrition/hydration status for malnutrition  Collaborate with interdisciplinary team and initiate plan and interventions as ordered    Monitor patient's weight and dietary intake as ordered or per policy  Utilize nutrition screening tool and intervene as necessary  Determine patient's food preferences and provide high-protein, high-caloric foods as appropriate       INTERVENTIONS:  - Monitor oral intake, urinary output, labs, and treatment plans  - Assess nutrition and hydration status and recommend course of action  - Evaluate amount of meals eaten  - Assist patient with eating if necessary   - Allow adequate time for meals  - Recommend/ encourage appropriate diets, oral nutritional supplements, and vitamin/mineral supplements  - Order, calculate, and assess calorie counts as needed  - Recommend, monitor, and adjust tube feedings and TPN/PPN based on assessed needs  - Assess need for intravenous fluids  - Provide specific nutrition/hydration education as appropriate  - Include patient/family/caregiver in decisions related to nutrition  8/20/2021 0244 by Lenin Nguyen RN  Outcome: Progressing  8/20/2021 0243 by Lenin Nguyen RN  Outcome: Progressing

## 2021-08-21 LAB
ANION GAP SERPL CALCULATED.3IONS-SCNC: 5 MMOL/L (ref 4–13)
BUN SERPL-MCNC: 18 MG/DL (ref 5–25)
CALCIUM SERPL-MCNC: 8.2 MG/DL (ref 8.3–10.1)
CHLORIDE SERPL-SCNC: 112 MMOL/L (ref 100–108)
CO2 SERPL-SCNC: 30 MMOL/L (ref 21–32)
CREAT SERPL-MCNC: 0.93 MG/DL (ref 0.6–1.3)
ERYTHROCYTE [DISTWIDTH] IN BLOOD BY AUTOMATED COUNT: 13.2 % (ref 11.6–15.1)
GFR SERPL CREATININE-BSD FRML MDRD: 67 ML/MIN/1.73SQ M
GLUCOSE SERPL-MCNC: 133 MG/DL (ref 65–140)
GLUCOSE SERPL-MCNC: 155 MG/DL (ref 65–140)
GLUCOSE SERPL-MCNC: 162 MG/DL (ref 65–140)
GLUCOSE SERPL-MCNC: 218 MG/DL (ref 65–140)
GLUCOSE SERPL-MCNC: 253 MG/DL (ref 65–140)
HCT VFR BLD AUTO: 31.4 % (ref 34.8–46.1)
HGB BLD-MCNC: 10.3 G/DL (ref 11.5–15.4)
MCH RBC QN AUTO: 35.2 PG (ref 26.8–34.3)
MCHC RBC AUTO-ENTMCNC: 32.8 G/DL (ref 31.4–37.4)
MCV RBC AUTO: 107 FL (ref 82–98)
PLATELET # BLD AUTO: 129 THOUSANDS/UL (ref 149–390)
PMV BLD AUTO: 12.1 FL (ref 8.9–12.7)
POTASSIUM SERPL-SCNC: 3.9 MMOL/L (ref 3.5–5.3)
RBC # BLD AUTO: 2.93 MILLION/UL (ref 3.81–5.12)
SODIUM SERPL-SCNC: 147 MMOL/L (ref 136–145)
SODIUM SERPL-SCNC: 149 MMOL/L (ref 136–145)
WBC # BLD AUTO: 5.34 THOUSAND/UL (ref 4.31–10.16)

## 2021-08-21 PROCEDURE — 92610 EVALUATE SWALLOWING FUNCTION: CPT

## 2021-08-21 PROCEDURE — 80048 BASIC METABOLIC PNL TOTAL CA: CPT

## 2021-08-21 PROCEDURE — 82948 REAGENT STRIP/BLOOD GLUCOSE: CPT

## 2021-08-21 PROCEDURE — 85027 COMPLETE CBC AUTOMATED: CPT

## 2021-08-21 PROCEDURE — 99232 SBSQ HOSP IP/OBS MODERATE 35: CPT | Performed by: INTERNAL MEDICINE

## 2021-08-21 PROCEDURE — 84295 ASSAY OF SERUM SODIUM: CPT | Performed by: INTERNAL MEDICINE

## 2021-08-21 RX ORDER — SODIUM CHLORIDE 450 MG/100ML
75 INJECTION, SOLUTION INTRAVENOUS CONTINUOUS
Status: DISCONTINUED | OUTPATIENT
Start: 2021-08-21 | End: 2021-08-22

## 2021-08-21 RX ORDER — SODIUM CHLORIDE, SODIUM GLUCONATE, SODIUM ACETATE, POTASSIUM CHLORIDE, MAGNESIUM CHLORIDE, SODIUM PHOSPHATE, DIBASIC, AND POTASSIUM PHOSPHATE .53; .5; .37; .037; .03; .012; .00082 G/100ML; G/100ML; G/100ML; G/100ML; G/100ML; G/100ML; G/100ML
1000 INJECTION, SOLUTION INTRAVENOUS ONCE
Status: COMPLETED | OUTPATIENT
Start: 2021-08-21 | End: 2021-08-21

## 2021-08-21 RX ORDER — DEXTROSE MONOHYDRATE 50 MG/ML
75 INJECTION, SOLUTION INTRAVENOUS CONTINUOUS
Status: DISCONTINUED | OUTPATIENT
Start: 2021-08-21 | End: 2021-08-21

## 2021-08-21 RX ADMIN — BACLOFEN 10 MG: 10 TABLET ORAL at 17:45

## 2021-08-21 RX ADMIN — PRAVASTATIN SODIUM 40 MG: 40 TABLET ORAL at 17:45

## 2021-08-21 RX ADMIN — FERROUS SULFATE TAB 325 MG (65 MG ELEMENTAL FE) 325 MG: 325 (65 FE) TAB at 17:45

## 2021-08-21 RX ADMIN — VALPROIC ACID 500 MG: 250 SOLUTION ORAL at 08:41

## 2021-08-21 RX ADMIN — VALPROIC ACID 500 MG: 250 SOLUTION ORAL at 17:45

## 2021-08-21 RX ADMIN — ARIPIPRAZOLE 30 MG: 10 TABLET ORAL at 21:33

## 2021-08-21 RX ADMIN — BACLOFEN 10 MG: 10 TABLET ORAL at 21:32

## 2021-08-21 RX ADMIN — OXYBUTYNIN 5 MG: 5 TABLET, FILM COATED, EXTENDED RELEASE ORAL at 08:41

## 2021-08-21 RX ADMIN — INSULIN LISPRO 1 UNITS: 100 INJECTION, SOLUTION INTRAVENOUS; SUBCUTANEOUS at 17:46

## 2021-08-21 RX ADMIN — INSULIN LISPRO 1 UNITS: 100 INJECTION, SOLUTION INTRAVENOUS; SUBCUTANEOUS at 13:59

## 2021-08-21 RX ADMIN — TRAZODONE HYDROCHLORIDE 100 MG: 100 TABLET ORAL at 21:32

## 2021-08-21 RX ADMIN — FERROUS SULFATE TAB 325 MG (65 MG ELEMENTAL FE) 325 MG: 325 (65 FE) TAB at 08:41

## 2021-08-21 RX ADMIN — SODIUM CHLORIDE 75 ML/HR: 0.45 INJECTION, SOLUTION INTRAVENOUS at 15:56

## 2021-08-21 RX ADMIN — LORAZEPAM 0.5 MG: 0.5 TABLET ORAL at 21:32

## 2021-08-21 RX ADMIN — LEVOTHYROXINE SODIUM 25 MCG: 25 TABLET ORAL at 06:25

## 2021-08-21 RX ADMIN — BACLOFEN 10 MG: 10 TABLET ORAL at 08:41

## 2021-08-21 RX ADMIN — DEXTROSE 75 ML/HR: 5 SOLUTION INTRAVENOUS at 13:58

## 2021-08-21 RX ADMIN — POLYETHYLENE GLYCOL 3350 17 G: 17 POWDER, FOR SOLUTION ORAL at 08:41

## 2021-08-21 RX ADMIN — PANTOPRAZOLE SODIUM 20 MG: 40 TABLET, DELAYED RELEASE ORAL at 17:45

## 2021-08-21 RX ADMIN — CITALOPRAM HYDROBROMIDE 40 MG: 20 TABLET ORAL at 08:41

## 2021-08-21 RX ADMIN — SODIUM CHLORIDE, SODIUM GLUCONATE, SODIUM ACETATE, POTASSIUM CHLORIDE, MAGNESIUM CHLORIDE, SODIUM PHOSPHATE, DIBASIC, AND POTASSIUM PHOSPHATE 1000 ML: .53; .5; .37; .037; .03; .012; .00082 INJECTION, SOLUTION INTRAVENOUS at 08:42

## 2021-08-21 RX ADMIN — ENOXAPARIN SODIUM 40 MG: 40 INJECTION SUBCUTANEOUS at 08:41

## 2021-08-21 RX ADMIN — STANDARDIZED SENNA CONCENTRATE 17.2 MG: 8.6 TABLET ORAL at 21:32

## 2021-08-21 NOTE — ASSESSMENT & PLAN NOTE
· Start D5W   · Consider transitioning to 1/2NS if BG levels rise significantly  · Monitor daily BMP  Will check Sodium level later today as well

## 2021-08-21 NOTE — SPEECH THERAPY NOTE
Speech Language Pathology    Speech Language Pathology Bedside Swallow Evaluation      Patient Name: Carlyon Harada  HQOBL'G Date: 8/21/2021     Recommend:  Continue Dysphagia 1/ Puree diet and Honey Thick liquids  Medications crushed in a puree bolus  1:1 feeding assistance for all meals  Honey Thick liquids via spoon  Maintain strict aspiration and reflux precautions! Spoke with patient's caregiver from group home and RN re: ST findings and recommendations  There are no further skilled ST needs at this time       Problem List  Principal Problem:    UTI (urinary tract infection)  Active Problems:    Hyperlipidemia    Gastroesophageal reflux disease    Cerebral palsy (HCC)    Bipolar disorder (HCC)    Type 2 diabetes mellitus, without long-term current use of insulin (HCC)    Constipation    Hypothyroidism    Hypernatremia    Hypokalemia    Dysphagia    Hypercalcemia    Past Medical History  Past Medical History:   Diagnosis Date    Adjustment disorder     Altered mental status 12/11/2015    Anemia     Bipolar 1 disorder (HCC)     Cerebral palsy (HCC)     Chronic hypernatremia 2/6/2016    Closed fracture of left hip ([facility] [address] ) 1/19/2016    Closed left hip fracture ([facility])     no surgery    Constipation     Dehydration 2/20/2016    Diabetes mellitus ([facility] [address] )     Disease of thyroid gland     Diverticulosis     Fracture of multiple ribs of right side     Hip fracture ([facility] [address] ) 07/26/2015    left    Hyperlipidemia     Hypernatremia 12/28/2018    Hypotension     Impulse control disorder     Incontinence     Intellectual disability due to developmental disorder, unspecified     Microalbuminuria     Osteopathia     Osteoporosis     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[facility])     Thrombocytopenia ([facility] [address] ) 7/31/2015     Past Surgical History  Past Surgical History:   Procedure Laterality Date    ABDOMINAL SURGERY      COLECTOMY MIN      re-anastomosis 7/22/16    COLONOSCOPY N/A 7/21/2016    Procedure: COLONOSCOPY;  Surgeon: Maura Walter MD;  [facility]: [facility]; Service:     COLONOSCOPY N/A 1/31/2016    Procedure: COLONOSCOPY;  Surgeon: Maura Walter MD;  [facility];  Service:     COLONOSCOPY N/A 7/25/2017    Procedure: COLONOSCOPY;  Surgeon: Maura Walter MD;  [facility]; Service: Colorectal    COLOSTOMY      EXPLORATORY LAPAROTOMY W/ BOWEL RESECTION N/A 1/31/2016    Procedure: exploratory laparotomy, left sigmoidectomy, coloproctostomy, take down splenic flexure, loop colostomy;  Surgeon: Maura Walter MD;  [facility];  Service:     IA CLOSE ENTEROSTOMY N/A 7/22/2016    Procedure: SEGMENTAL COLECTOMY WITH COLOCOLOSTOMY;  Surgeon: Maura Walter MD;  [facility];  Service: Colorectal    UPPER GASTROINTESTINAL ENDOSCOPY          08/21/21 0577   Patient Information   Current Medical 63year-old female admitted secondary to altered mental status  Past Medical History CP, HLD, DM, GERD, bipolar disorder and as outlined above  Social History Patient resides in a group home   Swallow Information   Current Risks for Dysphagia & Aspiration Cognitive deficit, h/o CP   Current Symptoms/Concerns History of dysphagia on a modified diet   Current Diet Dysphagia 1/ Puree; Honey thick liquids   Baseline Diet Dysphagia 1/ Puree; Honey thick liquids   Baseline Assessment   Behavior/ Cognition Alert;  Cooperative   Speech/ Language Status Non-verbal at baseline   Patient Positioning Upright in chair   Swallow Mechanism Exam   Labial Strength WFL   Labial ROM WFL   Lingual Strength WFL   Lingual ROM WFL   Mandible WFL   Dentition Partial dentition   Volitional Cough Strong   Consistencies Assessed and Performance   Materials Admnistered Puree/Level 1; Honey thick liquids   Oral Stage Moderately impaired   Oral Stage Comment Tongue thrust noted   Pharyngeal Stage Moderately impaired; Aspiration risk   Pharyngeal Stage Comment No clinical overt signs or symptoms of aspiration noted post swallows of puree or honey thick liquid  Swallow Mechanics Moderately delayed swallow initiation with good laryngeal rise; Aspiration risk   Esophageal Concerns No s/s reported   Strategies and Efficacy Small bites/ sips, HTL via spoon, alternate food/ fluid intakes, slow pace   Summary   Swallow Summary Oral and pharyngeal swallowing skills are [facility] for pureed solids and honey thick liquids  Recommendations   Risk for Aspiration Mild   Recommendations Continue oral diet   Diet Solid Recommendation Level 1 Dysphagia/ Puree   Diet Liquid Recommendation Honey Thick liquids   Recommended Form of Medications Crushed with puree bolus   General Precautions Aspiration precautions; Feed only when alert; Minimize distractions; Upright as possible for all oral intakes; Remain upright for 45 minutes after meals; Assist with feeding   CompensatorySwallowing Strategies Alternate solids and liquids; External pacing, Small bites/ sips, HTL via spoon   Further Evaluations Dietitian   Results Reviewed with RN   Treatment Recommendations   Duration of treatment None currently   Follow up treatments   [facility] 8/21   Dysphagia Goals N/A   Speech Therapy Prognosis   Prognosis Good   Prognosis Considerations Co-Morbidities;  Potential; Previous Level of Function; Severity of Impairments

## 2021-08-21 NOTE — ASSESSMENT & PLAN NOTE
Lab Results   Component Value Date    HGBA1C 6 8 (H) 08/19/2021       Recent Labs     08/20/21  1606 08/20/21  2109 08/21/21  0737 08/21/21  1122   POCGLU 108 210* 133 155*       Blood Sugar Average: Last 72 hrs:  (P) 151 5   · Continue Current regimen  · Maintain -180  · Monitor closely while on D5W for hypernatremia  If BG starts to rise significantly then transition to 1/2NS

## 2021-08-21 NOTE — ASSESSMENT & PLAN NOTE
· Current diet is dysphagia 1 pureed, honey thick liquid  · Will have speech therapy re-eval as current diet will make her at risk of recurrent dehydration

## 2021-08-21 NOTE — PROGRESS NOTES
[address] 1960, 63 y o  female MRN: [id number]  Unit/Bed#: W -85 Encounter: [id number]  Primary Care Provider: Arely Card DO   Date and time admitted to hospital: 8/19/2021 10:07 AM    Hypernatremia  Assessment & Plan  · Start D5W   · Consider transitioning to 1/2NS if BG levels rise significantly  · Monitor daily BMP  Will check Sodium level later today as well  Dysphagia  Assessment & Plan  · Current diet is dysphagia 1 pureed, honey thick liquid  · Will have speech therapy re-eval as current diet will make her at risk of recurrent dehydration  Hypothyroidism  Assessment & Plan  Continue home dose levothyroxine  Type 2 diabetes mellitus, without long-term current use of insulin [facility])  Assessment & Plan  Lab Results   Component Value Date    HGBA1C 6 8 (H) 08/19/2021       Recent Labs     08/20/21  1606 08/20/21  2109 08/21/21  0737 08/21/21  1122   POCGLU 108 210* 133 155*       Blood Sugar Average: Last 72 hrs:  (P) 151 5   · Continue Current regimen  · Maintain -180  · Monitor closely while on D5W for hypernatremia  If BG starts to rise significantly then transition to 1/2NS  * UTI (urinary tract infection)  Assessment & Plan  · Urine culture showed <22504   · D/C Ceftriaxone   · Monitor fever curves and WBCs        VTE Pharmacologic Prophylaxis:   VTE Score: 4 Moderate Risk (Score 3-4) - Pharmacological DVT Prophylaxis Ordered: Enoxaparin (Lovenox)  Mechanical VTE Prophylaxis in Place: Yes    Patient Centered Rounds: I have performed bedside rounds with nursing staff today  Discussions with Specialists or Other Care Team Provider:none    Education and Discussions with Family / Patient: Updated  (care giver) via phone  Current Length of Stay: 1 day(s)    Current Patient Status: Inpatient     Discharge Plan / Estimated Discharge Date: Anticipate discharge tomorrow to group home      Code Status: Level 1 - Full Code      Subjective:   Patient is non verbal at baseline  She is laying in bed with no distress  Objective:     Vitals:   Temp (24hrs), Av 5 °F (36 9 °C), Min:97 3 °F (36 3 °C), Max:99 2 °F (37 3 °C)    Temp:  [97 3 °F (36 3 °C)-99 2 °F (37 3 °C)] 97 3 °F (36 3 °C)  HR:  [56-72] 56  Resp:  [15-18] 15  BP: (86-96)/(54-62) 87/54  SpO2:  [92 %-96 %] 96 %  Body mass index is 19 63 kg/m²  Input and Output Summary (last 24 hours): Intake/Output Summary (Last 24 hours) at 2021 1355  Last data filed at 2021 1700  Gross per 24 hour   Intake 120 ml   Output --   Net 120 ml       Physical Exam:     Physical Exam  Vitals and nursing note reviewed  Constitutional:       General: She is not in acute distress  Appearance: She is well-developed  HENT:      Head: Normocephalic and atraumatic  Eyes:      Conjunctiva/sclera: Conjunctivae normal    Cardiovascular:      Rate and Rhythm: Normal rate and regular rhythm  Heart sounds: No murmur heard  Pulmonary:      Effort: Pulmonary effort is normal  No respiratory distress  Breath sounds: Normal breath sounds  Abdominal:      Palpations: Abdomen is soft  Tenderness: There is no abdominal tenderness  Musculoskeletal:         General: Deformity present  No swelling, tenderness or signs of injury  Cervical back: Neck supple  Skin:     General: Skin is warm and dry  Capillary Refill: Capillary refill takes less than 2 seconds  Neurological:      General: No focal deficit present  Mental Status: She is alert  She is disoriented  Motor: Weakness present     Psychiatric:      Comments: Non-verbal, baseline unclear          Additional Data:     Labs:  Results from last 7 days   Lab Units 21  0508 21  0440   WBC Thousand/uL 5 34 7 74   HEMOGLOBIN g/dL 10 3* 10 4*   HEMATOCRIT % 31 4* 32 2*   PLATELETS Thousands/uL 129* 131*   NEUTROS PCT %  --  46   LYMPHS PCT %  --  42   MONOS PCT % --  7   EOS PCT %  --  5     Results from last 7 days   Lab Units 08/21/21  0508 08/19/21  1029   SODIUM mmol/L 147* 144   POTASSIUM mmol/L 3 9 3 2*   CHLORIDE mmol/L 112* 103   CO2 mmol/L 30 32   BUN mg/dL 18 11   CREATININE mg/dL 0 93 1 05   ANION GAP mmol/L 5 9   CALCIUM mg/dL 8 2* 9 8   ALBUMIN g/dL  --  2 9*   TOTAL BILIRUBIN mg/dL  --  0 17*   ALK PHOS U/L  --  57   ALT U/L  --  11*   AST U/L  --  8   GLUCOSE RANDOM mg/dL 162* 185*         Results from last 7 days   Lab Units 08/21/21  1122 08/21/21  0737 08/20/21  2109 08/20/21  1606 08/20/21  1057 08/20/21  0721 08/19/21  2052 08/19/21  1029   POC GLUCOSE mg/dl 155* 133 210* 108 166* 111 138 191*     Results from last 7 days   Lab Units 08/19/21  1029   HEMOGLOBIN A1C % 6 8*     Results from last 7 days   Lab Units 08/19/21  1808 08/19/21  1541   LACTIC ACID mmol/L 2 6* 3 8*       Imaging: reviewed    Recent Cultures (last 7 days):     Results from last 7 days   Lab Units 08/19/21  1550 08/19/21  1540 08/19/21  1356   BLOOD CULTURE  No Growth at 24 hrs   No Growth at 24 hrs   --    URINE CULTURE   --   --  <10,000 cfu/ml        Lines/Drains:  Invasive Devices     Peripheral Intravenous Line            Peripheral IV 08/19/21 Right Antecubital 2 days                Telemetry:        Last 24 Hours Medication List:   Current Facility-Administered Medications   Medication Dose Route Frequency Provider Last Rate    acetaminophen  650 mg Oral Q6H PRN Areli Gann MD      ARIPiprazole  30 mg Oral HS Areli Gann MD      baclofen  10 mg Oral TID Areli Gann MD      bisacodyl  10 mg Rectal Daily PRN Areli Gann MD      citalopram  40 mg Oral QAM Areli Gann MD      dextrose  75 mL/hr Intravenous Continuous Jameson Scott MD      enoxaparin  40 mg Subcutaneous Q24H [address] Meron Mascorro MD      ferrous sulfate  325 mg Oral BID With Meals Areli Gann MD      insulin lispro  1-5 Units Subcutaneous TID JULIO CÉSAR Gann MD      levothyroxine  25 mcg Oral Early Morning Malcolm Alvarez MD      LORazepam  0 5 mg Oral HS Malcolm Alvarez MD      oxybutynin  5 mg Oral Daily Malcolm Alvarez MD      pantoprazole  20 mg Oral BID AC Malcolm Alvarez MD      polyethylene glycol  17 g Oral Daily Malcolm Alvarez MD      pravastatin  40 mg Oral Daily With Alexsander Lewis MD      senna  2 tablet Oral HS Malcolm Alvarez MD      traZODone  100 mg Oral HS Malcolm Alvarez MD      valproic acid  500 mg Oral BID Malcolm Alvarez MD          Today, Patient Was Seen By: Brian Laguerre MD    ** Please Note: This note has been constructed using a voice recognition system   **

## 2021-08-21 NOTE — PLAN OF CARE
Problem: SLP ADULT - SWALLOWING, IMPAIRED  Goal: Advance to least restrictive diet without signs or symptoms of aspiration for planned discharge setting  See evaluation for individualized goals  Description: Patient will:    1  Safely swallow pureed solids and honey thick liquids without overt signs of aspiration 100% of time  1 day    Outcome: Adequate for Discharge     Problem: SLP ADULT - SWALLOWING, IMPAIRED  Goal: Initial SLP swallow eval performed  Outcome: Completed

## 2021-08-22 PROBLEM — E83.52 HYPERCALCEMIA: Status: RESOLVED | Noted: 2021-08-19 | Resolved: 2021-08-22

## 2021-08-22 LAB
ANION GAP SERPL CALCULATED.3IONS-SCNC: 5 MMOL/L (ref 4–13)
ANION GAP SERPL CALCULATED.3IONS-SCNC: 6 MMOL/L (ref 4–13)
BUN SERPL-MCNC: 24 MG/DL (ref 5–25)
BUN SERPL-MCNC: 26 MG/DL (ref 5–25)
CALCIUM SERPL-MCNC: 8.1 MG/DL (ref 8.3–10.1)
CALCIUM SERPL-MCNC: 8.5 MG/DL (ref 8.3–10.1)
CHLORIDE SERPL-SCNC: 109 MMOL/L (ref 100–108)
CHLORIDE SERPL-SCNC: 112 MMOL/L (ref 100–108)
CO2 SERPL-SCNC: 29 MMOL/L (ref 21–32)
CO2 SERPL-SCNC: 30 MMOL/L (ref 21–32)
CREAT SERPL-MCNC: 0.97 MG/DL (ref 0.6–1.3)
CREAT SERPL-MCNC: 0.99 MG/DL (ref 0.6–1.3)
ERYTHROCYTE [DISTWIDTH] IN BLOOD BY AUTOMATED COUNT: 13.4 % (ref 11.6–15.1)
GFR SERPL CREATININE-BSD FRML MDRD: 63 ML/MIN/1.73SQ M
GFR SERPL CREATININE-BSD FRML MDRD: 64 ML/MIN/1.73SQ M
GLUCOSE SERPL-MCNC: 159 MG/DL (ref 65–140)
GLUCOSE SERPL-MCNC: 186 MG/DL (ref 65–140)
GLUCOSE SERPL-MCNC: 190 MG/DL (ref 65–140)
GLUCOSE SERPL-MCNC: 235 MG/DL (ref 65–140)
GLUCOSE SERPL-MCNC: 260 MG/DL (ref 65–140)
GLUCOSE SERPL-MCNC: 316 MG/DL (ref 65–140)
HCT VFR BLD AUTO: 31.8 % (ref 34.8–46.1)
HCT VFR BLD AUTO: 32.7 % (ref 34.8–46.1)
HGB BLD-MCNC: 10.3 G/DL (ref 11.5–15.4)
HGB BLD-MCNC: 10.4 G/DL (ref 11.5–15.4)
LACTATE SERPL-SCNC: 1.8 MMOL/L (ref 0.5–2)
LACTATE SERPL-SCNC: 2.1 MMOL/L (ref 0.5–2)
LACTATE SERPL-SCNC: 2.8 MMOL/L (ref 0.5–2)
MCH RBC QN AUTO: 35 PG (ref 26.8–34.3)
MCHC RBC AUTO-ENTMCNC: 31.8 G/DL (ref 31.4–37.4)
MCV RBC AUTO: 110 FL (ref 82–98)
PLATELET # BLD AUTO: 127 THOUSANDS/UL (ref 149–390)
PMV BLD AUTO: 12 FL (ref 8.9–12.7)
POTASSIUM SERPL-SCNC: 3.6 MMOL/L (ref 3.5–5.3)
POTASSIUM SERPL-SCNC: 4.1 MMOL/L (ref 3.5–5.3)
RBC # BLD AUTO: 2.97 MILLION/UL (ref 3.81–5.12)
SODIUM SERPL-SCNC: 144 MMOL/L (ref 136–145)
SODIUM SERPL-SCNC: 147 MMOL/L (ref 136–145)
WBC # BLD AUTO: 5.47 THOUSAND/UL (ref 4.31–10.16)

## 2021-08-22 PROCEDURE — 80048 BASIC METABOLIC PNL TOTAL CA: CPT | Performed by: INTERNAL MEDICINE

## 2021-08-22 PROCEDURE — 99232 SBSQ HOSP IP/OBS MODERATE 35: CPT | Performed by: STUDENT IN AN ORGANIZED HEALTH CARE EDUCATION/TRAINING PROGRAM

## 2021-08-22 PROCEDURE — 85018 HEMOGLOBIN: CPT | Performed by: INTERNAL MEDICINE

## 2021-08-22 PROCEDURE — 85027 COMPLETE CBC AUTOMATED: CPT | Performed by: INTERNAL MEDICINE

## 2021-08-22 PROCEDURE — 85014 HEMATOCRIT: CPT | Performed by: INTERNAL MEDICINE

## 2021-08-22 PROCEDURE — 83605 ASSAY OF LACTIC ACID: CPT

## 2021-08-22 PROCEDURE — 82948 REAGENT STRIP/BLOOD GLUCOSE: CPT

## 2021-08-22 PROCEDURE — 83605 ASSAY OF LACTIC ACID: CPT | Performed by: INTERNAL MEDICINE

## 2021-08-22 PROCEDURE — 99222 1ST HOSP IP/OBS MODERATE 55: CPT | Performed by: INTERNAL MEDICINE

## 2021-08-22 RX ORDER — B-COMPLEX WITH VITAMIN C
1 TABLET ORAL 2 TIMES DAILY WITH MEALS
Status: DISCONTINUED | OUTPATIENT
Start: 2021-08-22 | End: 2021-08-26 | Stop reason: HOSPADM

## 2021-08-22 RX ORDER — POTASSIUM CHLORIDE 20 MEQ/1
40 TABLET, EXTENDED RELEASE ORAL ONCE
Status: COMPLETED | OUTPATIENT
Start: 2021-08-22 | End: 2021-08-22

## 2021-08-22 RX ORDER — MELATONIN
1000 DAILY
Status: DISCONTINUED | OUTPATIENT
Start: 2021-08-22 | End: 2021-08-26 | Stop reason: HOSPADM

## 2021-08-22 RX ORDER — DEXTROSE AND SODIUM CHLORIDE 5; .9 G/100ML; G/100ML
75 INJECTION, SOLUTION INTRAVENOUS CONTINUOUS
Status: DISCONTINUED | OUTPATIENT
Start: 2021-08-22 | End: 2021-08-22

## 2021-08-22 RX ORDER — DEXTROSE MONOHYDRATE 50 MG/ML
100 INJECTION, SOLUTION INTRAVENOUS CONTINUOUS
Status: DISCONTINUED | OUTPATIENT
Start: 2021-08-22 | End: 2021-08-23

## 2021-08-22 RX ADMIN — PANTOPRAZOLE SODIUM 20 MG: 40 TABLET, DELAYED RELEASE ORAL at 08:21

## 2021-08-22 RX ADMIN — BACLOFEN 10 MG: 10 TABLET ORAL at 17:38

## 2021-08-22 RX ADMIN — VALPROIC ACID 500 MG: 250 SOLUTION ORAL at 08:20

## 2021-08-22 RX ADMIN — ENOXAPARIN SODIUM 40 MG: 40 INJECTION SUBCUTANEOUS at 08:20

## 2021-08-22 RX ADMIN — POTASSIUM CHLORIDE 40 MEQ: 1500 TABLET, EXTENDED RELEASE ORAL at 08:21

## 2021-08-22 RX ADMIN — INSULIN LISPRO 1 UNITS: 100 INJECTION, SOLUTION INTRAVENOUS; SUBCUTANEOUS at 14:28

## 2021-08-22 RX ADMIN — BACLOFEN 10 MG: 10 TABLET ORAL at 08:21

## 2021-08-22 RX ADMIN — PRAVASTATIN SODIUM 40 MG: 40 TABLET ORAL at 17:41

## 2021-08-22 RX ADMIN — CITALOPRAM HYDROBROMIDE 40 MG: 20 TABLET ORAL at 08:20

## 2021-08-22 RX ADMIN — VALPROIC ACID 500 MG: 250 SOLUTION ORAL at 17:39

## 2021-08-22 RX ADMIN — BACLOFEN 10 MG: 10 TABLET ORAL at 20:26

## 2021-08-22 RX ADMIN — STANDARDIZED SENNA CONCENTRATE 17.2 MG: 8.6 TABLET ORAL at 21:50

## 2021-08-22 RX ADMIN — LORAZEPAM 0.5 MG: 0.5 TABLET ORAL at 21:51

## 2021-08-22 RX ADMIN — INSULIN LISPRO 2 UNITS: 100 INJECTION, SOLUTION INTRAVENOUS; SUBCUTANEOUS at 17:39

## 2021-08-22 RX ADMIN — PANTOPRAZOLE SODIUM 20 MG: 40 TABLET, DELAYED RELEASE ORAL at 17:38

## 2021-08-22 RX ADMIN — Medication 1 TABLET: at 20:27

## 2021-08-22 RX ADMIN — Medication 1 TABLET: at 14:27

## 2021-08-22 RX ADMIN — OXYBUTYNIN 5 MG: 5 TABLET, FILM COATED, EXTENDED RELEASE ORAL at 08:20

## 2021-08-22 RX ADMIN — FERROUS SULFATE TAB 325 MG (65 MG ELEMENTAL FE) 325 MG: 325 (65 FE) TAB at 08:21

## 2021-08-22 RX ADMIN — TRAZODONE HYDROCHLORIDE 100 MG: 100 TABLET ORAL at 20:26

## 2021-08-22 RX ADMIN — INSULIN LISPRO 1 UNITS: 100 INJECTION, SOLUTION INTRAVENOUS; SUBCUTANEOUS at 08:21

## 2021-08-22 RX ADMIN — ARIPIPRAZOLE 30 MG: 10 TABLET ORAL at 20:27

## 2021-08-22 RX ADMIN — SODIUM CHLORIDE 75 ML/HR: 0.45 INJECTION, SOLUTION INTRAVENOUS at 05:37

## 2021-08-22 RX ADMIN — DEXTROSE 100 ML/HR: 5 SOLUTION INTRAVENOUS at 14:31

## 2021-08-22 RX ADMIN — Medication 1000 UNITS: at 08:21

## 2021-08-22 RX ADMIN — LEVOTHYROXINE SODIUM 25 MCG: 25 TABLET ORAL at 05:37

## 2021-08-22 RX ADMIN — DEXTROSE AND SODIUM CHLORIDE 75 ML/HR: 5; .9 INJECTION, SOLUTION INTRAVENOUS at 10:09

## 2021-08-22 RX ADMIN — FERROUS SULFATE TAB 325 MG (65 MG ELEMENTAL FE) 325 MG: 325 (65 FE) TAB at 17:38

## 2021-08-22 NOTE — ASSESSMENT & PLAN NOTE
Measured calcium was 9 8, corrected calcium elevated at 10 7  On calcium carbonate daily at home for osteoporosis    Measured calcium this morning was 8 1 (now Low)      Plan:  - restart home calcium carbonate and vitamin D

## 2021-08-22 NOTE — CASE MANAGEMENT
KOBY spoke with Sarahbenny St from [facility], [phone]  KOBY introduced self and role  Sarahbenny St is patient's nurse from group home setting  Per Sarah St, group home does not accept readmissions over the weekend  Sarah St stated she is going to bring to hospital printed physician orders for SLIM to sign  Sarah St will need printed copies of any new scripts at discharge  Group home utilizes [employer]  Sarah Maciel stated she has 2 morning appointments on 8/23/2021 but will be at the hospital afterwards  Lisa aware patient is a tentative discharge 24 hours pending medical stability  Lisa expressed understanding and able to assist with transport at d/c      CM updated SLIM re: plan of care

## 2021-08-22 NOTE — UTILIZATION REVIEW
Continued Stay Review    Date: 8/21/2021                          Current Patient Class: ip  Current Level of Care: med surg     HPI:61 y o  female initially admitted [address] [facility] 8/19/2021 [facility] 5 8/20/2021 1314 DUE TO ONGOING MANAGEMENT OF UTI REQ IV ANTIBX, HYPERCALCEMIA & HYPOKALEMIA IN A NON VERBAL , CEREBRAL PALSY PATIENT    Assessment/Plan: Urine cultures w mixed contaminant; DC IV Rocephin & cont to monitor clinically; serum NA levels elevated; initiate IV D5W for hydration & monitor accu checks; adjust insulin as needed; serial BMPs       Vital Signs:   Date/Time  Temp  Pulse  Resp  BP  MAP (mmHg)  SpO2  O2 Device  Patient Position - Orthostatic VS   08/21/21 14:33:15  97 9 °F (36 6 °C)  73  18  97/58  71  95 %  --  --   08/21/21 0826  --  56  --  87/54Abnormal   65  96 %  --  --   08/21/21 07:24:05  97 3 °F (36 3 °C)Abnormal   57  15  86/55Abnormal   65  93 %  --  --   08/20/21 21:11:19  98 9 °F (37 2 °C)  72  18  96/62  73  92 %  None (Room air)  Lying         Pertinent Labs/Diagnostic Results:   Results from last 7 days   Lab Units 08/19/21  1147   SARS-COV-2  Negative     Results from last 7 days   Lab Units 08/21/21  0508 08/20/21  0440 08/19/21  1029   WBC Thousand/uL 5 34 7 74 8 59   HEMOGLOBIN g/dL 10 3* 10 4* 11 8   HEMATOCRIT % 31 4* 32 2* 36 8   PLATELETS Thousands/uL 129* 131* 143*   NEUTROS ABS Thousands/µL  --  3 53 5 31         Results from last 7 days   Lab Units 08/21/21  1426 08/21/21  0508 08/20/21  1454 08/20/21  0440 08/19/21  1029   SODIUM mmol/L 149* 147* 147* 148* 144   POTASSIUM mmol/L  --  3 9 3 7 4 0 3 2*   CHLORIDE mmol/L  --  112* 110* 110* 103   CO2 mmol/L  --  30 35* 31 32   ANION GAP mmol/L  --  5 2* 7 9   BUN mg/dL  --  18 11 12 11   CREATININE mg/dL  --  0 93 0 89 0 90 1 05   EGFR ml/min/1 73sq m  --  67 71 70 58   CALCIUM mg/dL  --  8 2* 8 2* 8 5 9 8     Results from last 7 days   Lab Units 08/19/21  1029   AST U/L 8   ALT U/L 11* ALK PHOS U/L 57   TOTAL PROTEIN g/dL 6 9   ALBUMIN g/dL 2 9*   TOTAL BILIRUBIN mg/dL 0 17*     Results from last 7 days   Lab Units 08/21/21  1457 08/21/21  1122 08/21/21  0737 08/20/21  2109 08/20/21  1606 08/20/21  1057 08/20/21  0721 08/19/21  2052 08/19/21  1029   POC GLUCOSE mg/dl 218* 155* 133 210* 108 166* 111 138 191*     Results from last 7 days   Lab Units 08/21/21  0508 08/20/21  1454 08/20/21  0440 08/19/21  1029   GLUCOSE RANDOM mg/dL 162* 106 95 185*         Results from last 7 days   Lab Units 08/19/21  1029   HEMOGLOBIN A1C % 6 8*   EAG mg/dl 148     No results found for: BETA-HYDROXYBUTYRATE                   Results from last 7 days   Lab Units 08/19/21  1029   TROPONIN I ng/mL <0 02             Results from last 7 days   Lab Units 08/19/21  1029   TSH 3RD GENERATON uIU/mL 2 184         Results from last 7 days   Lab Units 08/19/21  1808 08/19/21  1541   LACTIC ACID mmol/L 2 6* 3 8*         Results from last 7 days   Lab Units 08/19/21  1029   LIPASE u/L 173             Results from last 7 days   Lab Units 08/19/21  1356   CLARITY UA  Turbid   COLOR UA  Light Yellow   SPEC GRAV UA  1 010   PH UA  6 0   GLUCOSE UA mg/dl 250 (1/4%)*   KETONES UA mg/dl Negative   BLOOD UA  Trace-Intact*   PROTEIN UA mg/dl Negative   NITRITE UA  Negative   BILIRUBIN UA  Negative   UROBILINOGEN UA E U /dl 0 2   LEUKOCYTES UA  Moderate*   WBC UA /hpf Innumerable*   RBC UA /hpf 4-10*   BACTERIA UA /hpf Innumerable*   EPITHELIAL CELLS WET PREP /hpf Moderate*         Results from last 7 days   Lab Units 08/19/21  1550 08/19/21  1540 08/19/21  1356   BLOOD CULTURE  No Growth at 24 hrs   No Growth at 24 hrs   --    URINE CULTURE   --   --  <10,000 cfu/ml        Medications:   Scheduled Medications:  ARIPiprazole, 30 mg, Oral, HS  baclofen, 10 mg, Oral, TID  citalopram, 40 mg, Oral, QAM  enoxaparin, 40 mg, Subcutaneous, Q24H ROSEMARY  ferrous sulfate, 325 mg, Oral, BID With Meals  insulin lispro, 1-5 Units, Subcutaneous, TID AC  levothyroxine, 25 mcg, Oral, Early Morning  LORazepam, 0 5 mg, Oral, HS  oxybutynin, 5 mg, Oral, Daily  pantoprazole, 20 mg, Oral, BID AC  polyethylene glycol, 17 g, Oral, Daily  pravastatin, 40 mg, Oral, Daily With Dinner  senna, 2 tablet, Oral, HS  traZODone, 100 mg, Oral, HS  valproic acid, 500 mg, Oral, BID  Continuous IV Infusions:  sodium chloride, 75 mL/hr, Intravenous, Continuous    PRN Meds:  acetaminophen, 650 mg, Oral, Q6H PRN  bisacodyl, 10 mg, Rectal, Daily PRN    Discharge Plan: tbd  Network Utilization Review Department  ATTENTION: Please call with any questions or concerns to [phone] and carefully listen to the prompts so that you are directed to the right person  All voicemails are confidential   Avelina Cowden all requests for admission clinical reviews, approved or denied determinations and any other requests to dedicated fax number below belonging to the campus where the patient is receiving treatment   List of dedicated fax numbers for the Facilities:  [address] DENIALS (Administrative/Medical Necessity) [phone]   [address] (Maternity/NICU/Pediatrics) [phone] [address] Sharla Estevesel Sury 8618 [address] [place] [address] Vinod Morales 1481 [address] [facility] [facility] [phone]

## 2021-08-22 NOTE — PROGRESS NOTES
[address] 1960, 63 y o  female MRN: [id number]  Unit/Bed#: W -68 Encounter: [id number]  Primary Care Provider: Jomar Parsons DO   Date and time admitted to hospital: 8/19/2021 10:07 AM    * UTI (urinary tract infection)  Assessment & Plan  · History of recurrent UTIs  On prophylactic nitrofurantoin daily per urology  · Past urine cultures speciated to pansensitive klebsiella (9/1/2020) and enterobacter resistant to nitrofurantoin but susceptible to ceftriaxone (7/26/2020)  · Unlikely MDRO given past cultures  · Did not meet Sepsis criteria on admission: , RR 20, WBC 8 59 w/o bandemia, afebrile   · Lactate elevated at 3 8 on admission    Plan:   - lactic acid trending down  - urine and blood culture showed no growth to date  - afebrile since admission   - No leukocytosis    Hypokalemia  Assessment & Plan  Potassium 3 2 on admission    Plan:   - K 3 6  Most recently  -Gave KDUR 40 PO for one dose today 8/22   -Trend BMP     Type 2 diabetes mellitus, without long-term current use of insulin [facility])  Assessment & Plan  Lab Results   Component Value Date    HGBA1C 6 8 (H) 08/19/2021       Recent Labs     08/21/21  1457 08/21/21  2124 08/22/21  0745 08/22/21  1117   POCGLU 218* 253* 186* 159*       Blood Sugar Average: Last 72 hrs:  (P) 169     Non-insulin dependent  Home meds metformin 1000mg daily, linagliptin 5mg daily (started on 6/25/21 for recurrent hyperglycemia)  Plan:    - hypoglycemic protocol   - diabetic diet level 2     Hypothyroidism  Assessment & Plan  TSH WNL  Continue home levothyroxine 25mcg daily     Hyperlipidemia  Assessment & Plan  Lipid panel April 2021 WNL   Continue statin     Dysphagia  Assessment & Plan  · Moderate oral/pharyngeal dysphagia  Follows with GI    · EGD 10/26/20 with moderate gastritis and no esophageal stricture/narrowing or web seen   Pathology negative for h pylori, intestinal metaplasia, and EOE   · Honey thickened liquid diet per prior speech evaluation  Plan:   - diet: dysphagia 1 pureed, honey thick liquid; diabetic diet carb controlled level 2   - medications crushed   - patient cleared for discharge per speech eval    Hypernatremia  Assessment & Plan  147 this morning  Ordered D5 normal at 75 mL/hour saline in place of sodium chloride       Constipation  Assessment & Plan  CT A/P w/ contrast (8/19): Evidence of significant fecal stasis with probable impaction in the rectum  There is distention of the left colon probably related to this distal obstruction whereas the right colon is not as distended nor is there is small bowel distention  Per caretaker, she did have four bowel movements yesterday that were not fully formed  Plan:  - continue home Miralax 17g daily   - senna scheduled while inpatient   - ducolax suppository given     [facility]  Assessment & Plan  Continue home medication regimen:  - abilify 30mg at bedtime  - ativan 0 5mg at bedtime   - citalopram 40mg daily   - valproic acid 500mg BID   - trazodone 100mg at bedtime     Cerebral palsy [facility])  Assessment & Plan  Patient largely non-verbal at baseline     Gastroesophageal reflux disease  Assessment & Plan  Continue home PPI         VTE Pharmacologic Prophylaxis: VTE Score: 4 Moderate Risk (Score 3-4) - Pharmacological DVT Prophylaxis Ordered: enoxaparin (Lovenox)  Patient Centered Rounds: I performed bedside rounds with nursing staff today  Discussions with Specialists or Other Care Team Provider: Speech    Education and Discussions with Family / Patient: Updated  (Care giver) via phone '    Current Length of Stay: 2 day(s)  Current Patient Status: Inpatient   Discharge Plan: Tomorrow to home facility     Code Status: Level 1 - Full Code    Subjective:   No events overnight  Patient is somnolent this morning on examination  Displaying no signs of acute distress    Patient nonverbal at baseline per collateral   Recumbent in bed sleeping peacefully  Arousable  Objective:     Vitals:   Temp (24hrs), Av 6 °F (36 4 °C), Min:97 °F (36 1 °C), Max:97 9 °F (36 6 °C)    Temp:  [97 °F (36 1 °C)-97 9 °F (36 6 °C)] 97 °F (36 1 °C)  HR:  [54-73] 54  Resp:  [15-18] 15  BP: ()/(58-64) 112/64  SpO2:  [93 %-96 %] 96 %  Body mass index is 19 63 kg/m²  Input and Output Summary (last 24 hours): Intake/Output Summary (Last 24 hours) at 2021 1255  Last data filed at 2021 1001  Gross per 24 hour   Intake 2072 5 ml   Output 432 ml   Net 1640 5 ml       Physical Exam:   Physical Exam  Vitals and nursing note reviewed  Constitutional:       General: She is not in acute distress  Appearance: She is well-developed  HENT:      Head: Normocephalic and atraumatic  Eyes:      Conjunctiva/sclera: Conjunctivae normal    Cardiovascular:      Rate and Rhythm: Normal rate and regular rhythm  Heart sounds: No murmur heard  Pulmonary:      Effort: Pulmonary effort is normal  No respiratory distress  Breath sounds: Normal breath sounds  Abdominal:      Palpations: Abdomen is soft  Tenderness: There is no abdominal tenderness  Musculoskeletal:      Cervical back: Neck supple  Comments: Upper extremity contractures noted on inspection   Skin:     General: Skin is warm and dry  Neurological:      Mental Status: She is alert  Mental status is at baseline     Psychiatric:         Mood and Affect: Mood normal           Additional Data:     Labs:  Results from last 7 days   Lab Units 21  0541 21  0440   WBC Thousand/uL 5 47 7 74   HEMOGLOBIN g/dL 10 4* 10 4*   HEMATOCRIT % 32 7* 32 2*   PLATELETS Thousands/uL 127* 131*   NEUTROS PCT %  --  46   LYMPHS PCT %  --  42   MONOS PCT %  --  7   EOS PCT %  --  5     Results from last 7 days   Lab Units 21  0541 21  1029   SODIUM mmol/L 147* 144   POTASSIUM mmol/L 3 6 3 2*   CHLORIDE mmol/L 112* 103   CO2 mmol/L 30 32 BUN mg/dL 26* 11   CREATININE mg/dL 0 97 1 05   ANION GAP mmol/L 5 9   CALCIUM mg/dL 8 1* 9 8   ALBUMIN g/dL  --  2 9*   TOTAL BILIRUBIN mg/dL  --  0 17*   ALK PHOS U/L  --  57   ALT U/L  --  11*   AST U/L  --  8   GLUCOSE RANDOM mg/dL 190* 185*         Results from last 7 days   Lab Units 08/22/21  1117 08/22/21  0745 08/21/21  2124 08/21/21  1457 08/21/21  1122 08/21/21  0737 08/20/21  2109 08/20/21  1606 08/20/21  1057 08/20/21  0721 08/19/21 2052 08/19/21  1029   POC GLUCOSE mg/dl 159* 186* 253* 218* 155* 133 210* 108 166* 111 138 191*     Results from last 7 days   Lab Units 08/19/21  1029   HEMOGLOBIN A1C % 6 8*     Results from last 7 days   Lab Units 08/19/21  1808 08/19/21  1541   LACTIC ACID mmol/L 2 6* 3 8*       Lines/Drains:  Invasive Devices     Peripheral Intravenous Line            Peripheral IV 08/19/21 Right Antecubital 3 days                      Imaging: Reviewed radiology reports from this admission including: abdominal/pelvic CT    Recent Cultures (last 7 days):   Results from last 7 days   Lab Units 08/19/21  1550 08/19/21  1540 08/19/21  1356   BLOOD CULTURE  No Growth at 48 hrs  No Growth at 48 hrs    --    URINE CULTURE   --   --  <10,000 cfu/ml        Last 24 Hours Medication List:   Current Facility-Administered Medications   Medication Dose Route Frequency Provider Last Rate    acetaminophen  650 mg Oral Q6H PRN Tomy Leija MD      ARIPiprazole  30 mg Oral HS Tomy Leija MD      baclofen  10 mg Oral TID Tomy Leija MD      bisacodyl  10 mg Rectal Daily PRN Tomy Leija MD      calcium carbonate-vitamin D  1 tablet Oral BID With Meals Kishore Pagan MD      cholecalciferol  1,000 Units Oral Daily Kishore Pagan MD      citalopram  40 mg Oral Luther Wooten MD      dextrose  100 mL/hr Intravenous Continuous Alfredo Weldon MD      enoxaparin  40 mg Subcutaneous Q24H [address] [place] Dominique Aburto MD      ferrous sulfate  325 mg Oral BID With Meals Tomy Leija MD      insulin lispro  1-5 Units Subcutaneous TID Shraddha Mcdowell MD      levothyroxine  25 mcg Oral Early Morning Acquanetta Olszewski, MD      LORazepam  0 5 mg Oral HS Acquanetta Olszewski, MD      oxybutynin  5 mg Oral Daily Acquanetta Olszewski, MD      pantoprazole  20 mg Oral BID AC Acquanetta Olszewski, MD      polyethylene glycol  17 g Oral Daily Acquanetta Olszewski, MD      pravastatin  40 mg Oral Daily With Randolph Hodge MD      senna  2 tablet Oral HS Acquanetta Olszewski, MD      traZODone  100 mg Oral HS Acquanetta Olszewski, MD      valproic acid  500 mg Oral BID Acquanetta Olszewski, MD          Today, Patient Was Seen By: Sudheer Hoang MD    **Please Note: This note may have been constructed using a voice recognition system  **

## 2021-08-22 NOTE — DISCHARGE INSTR - AVS FIRST PAGE
Dear Carlyon Harada,     It was our pleasure to care for you here at [facility], [facility]  It is our hope that we were always able to exceed the expected standards for your care during your stay  You were hospitalized due to suspected UTI  You were cared for on the 4th floor by Elizabeth Blanco MD under the service of Christine Farias MD with the [facility] who covers for your primary care physician (PCP), Shira Haskins DO, while you were hospitalized  If you have any questions or concerns related to this hospitalization, you may contact us at [phone]  For follow up as well as any medication refills, we recommend that you follow up with your primary care physician  A registered nurse will reach out to you by phone within a few days after your discharge to answer any additional questions that you may have after going home  However, at this time we provide for you here, the most important instructions / recommendations at discharge:     · Notable Medication Adjustments -   · None  · Testing Required after Discharge -   · None  · Important follow up information -   · Please follow-up with the primary care provider 1 week following discharge  · Other Instructions -   · None  · Please review this entire after visit summary as additional general instructions including medication list, appointments, activity, diet, any pertinent wound care, and other additional recommendations from your care team that may be provided for you        Sincerely,     Elizabeth Blanco MD

## 2021-08-22 NOTE — PLAN OF CARE
Problem: Potential for Falls  Goal: Patient will remain free of falls  Description: INTERVENTIONS:  - Educate patient/family on patient safety including physical limitations  - Instruct patient to call for assistance with activity   - Consult OT/PT to assist with strengthening/mobility   - Keep Call bell within reach  - Keep bed low and locked with side rails adjusted as appropriate  - Keep care items and personal belongings within reach  - Initiate and maintain comfort rounds  - Make Fall Risk Sign visible to staff  - Offer Toileting every 4 Hours, in advance of need  - Initiate/Maintain Bedalarm  - Obtain necessary fall risk management equipment: Alarms  - Apply yellow socks and bracelet for high fall risk patients  - Consider moving patient to room near nurses station  Outcome: Progressing     Problem: MOBILITY - ADULT  Goal: Maintain or return to baseline ADL function  Description: INTERVENTIONS:  -  Assess patient's ability to carry out ADLs; assess patient's baseline for ADL function and identify physical deficits which impact ability to perform ADLs (bathing, care of mouth/teeth, toileting, grooming, dressing, etc )  - Assess/evaluate cause of self-care deficits   - Assess range of motion  - Assess patient's mobility; develop plan if impaired  - Assess patient's need for assistive devices and provide as appropriate  - Encourage maximum independence but intervene and supervise when necessary  - Involve family in performance of ADLs  - Assess for home care needs following discharge   - Consider OT consult to assist with ADL evaluation and planning for discharge  - Provide patient education as appropriate  Outcome: Progressing  Goal: Maintains/Returns to pre admission functional level  Description: INTERVENTIONS:  - Perform BMAT or MOVE assessment daily    - Set and communicate daily mobility goal to care team and patient/family/caregiver     - Collaborate with rehabilitation services on mobility goals if consulted  - Perform Range of Motion 4 times a day  - Reposition patient every 2 hours  - Dangle patient 2 times a day  - Stand patient 2 times a day  - Ambulate patient 2 times a day  - Out of bed to chair 3 times a day   - Out of bed for meals 3 times a day  - Out of bed for toileting  - Record patient progress and toleration of activity level   Outcome: Progressing     Problem: Prexisting or High Potential for Compromised Skin Integrity  Goal: Skin integrity is maintained or improved  Description: INTERVENTIONS:  - Identify patients at risk for skin breakdown  - Assess and monitor skin integrity  - Assess and monitor nutrition and hydration status  - Monitor labs   - Assess for incontinence   - Turn and reposition patient  - Assist with mobility/ambulation  - Relieve pressure over bony prominences  - Avoid friction and shearing  - Provide appropriate hygiene as needed including keeping skin clean and dry  - Evaluate need for skin moisturizer/barrier cream  - Collaborate with interdisciplinary team   - Patient/family teaching  - Consider wound care consult   Outcome: Progressing     Problem: Nutrition/Hydration-ADULT  Goal: Nutrient/Hydration intake appropriate for improving, restoring or maintaining nutritional needs  Description: Monitor and assess patient's nutrition/hydration status for malnutrition  Collaborate with interdisciplinary team and initiate plan and interventions as ordered  Monitor patient's weight and dietary intake as ordered or per policy  Utilize nutrition screening tool and intervene as necessary  Determine patient's food preferences and provide high-protein, high-caloric foods as appropriate       INTERVENTIONS:  - Monitor oral intake, urinary output, labs, and treatment plans  - Assess nutrition and hydration status and recommend course of action  - Evaluate amount of meals eaten  - Assist patient with eating if necessary   - Allow adequate time for meals  - Recommend/ encourage appropriate diets, oral nutritional supplements, and vitamin/mineral supplements  - Order, calculate, and assess calorie counts as needed  - Recommend, monitor, and adjust tube feedings and TPN/PPN based on assessed needs  - Assess need for intravenous fluids  - Provide specific nutrition/hydration education as appropriate  - Include patient/family/caregiver in decisions related to nutrition  Outcome: Progressing

## 2021-08-22 NOTE — ASSESSMENT & PLAN NOTE
· History of recurrent UTIs  On prophylactic nitrofurantoin daily per urology  · Past urine cultures speciated to pansensitive klebsiella (9/1/2020) and enterobacter resistant to nitrofurantoin but susceptible to ceftriaxone (7/26/2020)  · Unlikely MDRO given past cultures     · Did not meet Sepsis criteria on admission: , RR 20, WBC 8 59 w/o bandemia, afebrile   · Lactate elevated at 3 8 on admission    Plan:   - lactic acid trending down  - urine and blood culture showed no growth to date  - afebrile since admission   - No leukocytosis

## 2021-08-22 NOTE — ASSESSMENT & PLAN NOTE
Lab Results   Component Value Date    HGBA1C 6 8 (H) 08/19/2021       Recent Labs     08/21/21  1457 08/21/21  2124 08/22/21  0745 08/22/21  1117   POCGLU 218* 253* 186* 159*       Blood Sugar Average: Last 72 hrs:  (P) 169     Non-insulin dependent  Home meds metformin 1000mg daily, linagliptin 5mg daily (started on 6/25/21 for recurrent hyperglycemia)       Plan:    - hypoglycemic protocol   - diabetic diet level 2

## 2021-08-22 NOTE — CONSULTS
2           Consultation - Nephrology   Austin Sosa 63 y o  female MRN: [id number]  Unit/Bed#: W -01 Encounter: [id number]      Assessment/Plan     Assessment / Plan:  1  Hypernatremia    The patient was admitted to the hospital and sodium concentration increased to 148 mEq per L and over the next 2 days has remained stable there  Reviewing the chart there was attempts with some hypotonic fluids that isotonic fluids which he was on this morning which will not provide free water to the patient  Looking at her urine output there is not polyuria so she likely just has a water deficit related to decreased intake  At this point calculating her deficit and accounting for insensible losses I am going to start D5W at 100 cc/hour and that should correct her sodium concentration to normal by tomorrow morning  D5W 100 cc/hour  Discontinue normal saline  Primary service to control blood sugars because if these elevate could cause osmotic diuresis resulting in electrolyte free water excretion in the urine  History of Present Illness   Physician Requesting Consult: Graham Wiley MD  Reason for Consult / Principal Problem:  Hypernatremia  Hx and PE limited by:   HPI: Austin Sosa is a 63y o  year old female who was admitted to the hospital   She has chronic comorbidities of cerebral palsy and requires assistance  She is nonverbal at baseline  She had decreased activity and there was a concern for infection when she arrived in the hospital she developed increasing water deficit and there was attempts by the primary service to improve this with IV fluids and were asked to see it because it has delayed improvement  History obtained from chart review as unable to obtain history from patient given her nonverbal clinical state        Consults    Review of Systems    Patient is in bed I awakened her her eyes were open she was nonverbal for me so unable to obtain review of system from patient given her clinical state      Historical Information   Patient Active Problem List   Diagnosis    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HCC)    Hyperlipidemia    Gastroesophageal reflux disease    Cerebral palsy ([address] )    Spasticity    Ambulatory dysfunction    UTI (urinary tract infection)    Bipolar disorder (HCC)    Severe Intellectual disability    Type 2 diabetes mellitus, without long-term current use of insulin (HCC)    Abnormal albumin    Iron deficiency anemia    Toxic metabolic encephalopathy    Anxiety    Atopic dermatitis    Chondrodermatitis nodularis helicis of left ear    Constipation    Dry eye    Gait abnormality    Hypothyroidism    Menopause    Osteoporosis    Other chronic pain    Peripheral neuropathy    Physical deconditioning    Resting tremor    Seasonal allergies    Urinary incontinence    Osteoarthritis of both hips    Gait disturbance    Severe sepsis (HCC)    Hypernatremia    Breast asymmetry    Abdominal bloating    History of fall    History of vitamin D deficiency    Acute cystitis without hematuria    Decreased appetite    Hypokalemia    Dysphagia    Underweight    Eosinophilic leukocytosis    Macrocytic anemia    Exposure to COVID-19 virus    Abnormal finding on lung imaging    Herpes zoster without complication     Past Medical History:   Diagnosis Date    Adjustment disorder     Altered mental status 12/11/2015    Anemia     Bipolar 1 disorder (HCC)     Cerebral palsy (HCC)     Chronic hypernatremia 2/6/2016    Closed fracture of left hip ([facility] ) 1/19/2016    [facility]     no surgery    Constipation     Dehydration 2/20/2016    Diabetes mellitus ([address] )     Disease of thyroid gland     Diverticulosis     Fracture of multiple ribs of right side     Hip fracture ([facility] ) 07/26/2015    left    Hyperlipidemia     Hypernatremia 12/28/2018    Hypotension     Impulse control disorder     Incontinence     Intellectual disability due to developmental disorder, unspecified     Microalbuminuria     Osteopathia     Osteoporosis     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     Thrombocytopenia ([facility] ) 7/31/2015     Past Surgical History:   Procedure Laterality Date    ABDOMINAL SURGERY      COLECTOMY MIN      re-anastomosis 7/22/16    COLONOSCOPY N/A 7/21/2016    Procedure: COLONOSCOPY;  Surgeon: Maura Walter MD;  [facility]: [facility]; Service:     COLONOSCOPY N/A 1/31/2016    Procedure: COLONOSCOPY;  Surgeon: Maura Walter MD;  [facility];  Service:     COLONOSCOPY N/A 7/25/2017    Procedure: COLONOSCOPY;  Surgeon: Maura Walter MD;  [facility];   Service: Colorectal    COLOSTOMY      EXPLORATORY LAPAROTOMY W/ BOWEL RESECTION N/A 1/31/2016    Procedure: exploratory laparotomy, left sigmoidectomy, coloproctostomy, take down splenic flexure, loop colostomy;  Surgeon: Maura Walter MD;  [facility];  Service:     NH CLOSE ENTEROSTOMY N/A 7/22/2016    Procedure: SEGMENTAL COLECTOMY WITH COLOCOLOSTOMY;  Surgeon: Maura Walter MD;  [facility];  Service: Colorectal    UPPER GASTROINTESTINAL ENDOSCOPY       Social History   Social History     Substance and Sexual Activity   Alcohol Use Not Currently     Social History     Substance and Sexual Activity   Drug Use No     Social History     Tobacco Use   Smoking Status Never Smoker   Smokeless Tobacco Never Used     Family History   Problem Relation Age of Onset    Alcohol abuse Mother     Alcohol abuse Father     Diabetes Sister     No Known Problems Sister     No Known Problems Sister        Meds/Allergies   current meds:   Current Facility-Administered Medications   Medication Dose Route Frequency    acetaminophen (TYLENOL) oral suspension 650 mg  650 mg Oral Q6H PRN    ARIPiprazole (ABILIFY) tablet 30 mg  30 mg Oral HS    baclofen tablet 10 mg  10 mg Oral TID    bisacodyl (DULCOLAX) rectal suppository 10 mg  10 mg Rectal Daily PRN    calcium carbonate-vitamin D (OSCAL-D) 500 mg-200 units per tablet 1 tablet  1 tablet Oral BID With Meals    cholecalciferol (VITAMIN D3) tablet 1,000 Units  1,000 Units Oral Daily    citalopram (CeleXA) tablet 40 mg  40 mg Oral QAM    dextrose 5 % and sodium chloride 0 9 % infusion  75 mL/hr Intravenous Continuous    enoxaparin (LOVENOX) subcutaneous injection 40 mg  40 mg Subcutaneous Q24H ROSEMARY    ferrous sulfate tablet 325 mg  325 mg Oral BID With Meals    insulin lispro (HumaLOG) 100 units/mL subcutaneous injection 1-5 Units  1-5 Units Subcutaneous TID AC    levothyroxine tablet 25 mcg  25 mcg Oral Early Morning    LORazepam (ATIVAN) tablet 0 5 mg  0 5 mg Oral HS    oxybutynin (DITROPAN-XL) 24 hr tablet 5 mg  5 mg Oral Daily    pantoprazole (PROTONIX) EC tablet 20 mg  20 mg Oral BID AC    polyethylene glycol (MIRALAX) packet 17 g  17 g Oral Daily    pravastatin (PRAVACHOL) tablet 40 mg  40 mg Oral Daily With Dinner    senna (SENOKOT) tablet 17 2 mg  2 tablet Oral HS    traZODone (DESYREL) tablet 100 mg  100 mg Oral HS    valproic acid (DEPAKENE) oral soln 500 mg  500 mg Oral BID     No Known Allergies    Objective     Intake/Output Summary (Last 24 hours) at 8/22/2021 1200  Last data filed at 8/22/2021 1001  Gross per 24 hour   Intake 2072 5 ml   Output 432 ml   Net 1640 5 ml     Body mass index is 19 63 kg/m²  Invasive Devices:        PHYSICAL EXAM:  /64   Pulse (!) 54   Temp (!) 97 °F (36 1 °C)   Resp 15   Ht 4' 10" (1 473 m)   Wt 42 6 kg (93 lb 14 7 oz)   LMP 11/29/2009 (Exact Date)   SpO2 96%   BMI 19 63 kg/m²     Physical Exam  Constitutional:       General: She is not in acute distress  Appearance: She is not toxic-appearing or diaphoretic  Comments: Will open eyes but non communicative for me   HENT:      Head: Normocephalic and atraumatic  Mouth/Throat:      Mouth: Mucous membranes are dry  Cardiovascular:      Rate and Rhythm: Normal rate and regular rhythm  Heart sounds: No friction rub  No gallop  Pulmonary:      Effort: Pulmonary effort is normal  No respiratory distress  Breath sounds: Normal breath sounds  No wheezing, rhonchi or rales  Abdominal:      General: Bowel sounds are normal  There is no distension  Palpations: Abdomen is soft  Tenderness: There is no abdominal tenderness  Musculoskeletal:      Cervical back: Normal range of motion and neck supple  Neurological:      Comments: Suspected at baseline unable to accurately assess     Psychiatric:      Comments: Unable to assess as nonverbal           Current Weight: Weight - Scale: 42 6 kg (93 lb 14 7 oz)  First Weight: Weight - Scale: 41 7 kg (92 lb)    Lab Results:    Results from last 7 days   Lab Units 08/22/21  0541   WBC Thousand/uL 5 47   HEMOGLOBIN g/dL 10 4*   HEMATOCRIT % 32 7*   PLATELETS Thousands/uL 127*     Results from last 7 days   Lab Units 08/22/21  0541   POTASSIUM mmol/L 3 6   CHLORIDE mmol/L 112*   CO2 mmol/L 30   BUN mg/dL 26*   CREATININE mg/dL 0 97   CALCIUM mg/dL 8 1*     Results from last 7 days   Lab Units 08/22/21  0541 08/19/21  1029   POTASSIUM mmol/L 3 6 3 2*   CHLORIDE mmol/L 112* 103   CO2 mmol/L 30 32   BUN mg/dL 26* 11   CREATININE mg/dL 0 97 1 05   CALCIUM mg/dL 8 1* 9 8   ALK PHOS U/L  --  57   ALT U/L  --  11*   AST U/L  --  8

## 2021-08-22 NOTE — ASSESSMENT & PLAN NOTE
Potassium 3 2 on admission    Plan:   - K 3 6  Most recently  -Gave KDUR 40 PO for one dose today 8/22   -Trend BMP

## 2021-08-22 NOTE — ASSESSMENT & PLAN NOTE
· Moderate oral/pharyngeal dysphagia  Follows with GI    · EGD 10/26/20 with moderate gastritis and no esophageal stricture/narrowing or web seen  Pathology negative for h pylori, intestinal metaplasia, and EOE  · Honey thickened liquid diet per prior speech evaluation       Plan:   - diet: dysphagia 1 pureed, honey thick liquid; diabetic diet carb controlled level 2   - medications crushed   - patient cleared for discharge per speech eval

## 2021-08-22 NOTE — ASSESSMENT & PLAN NOTE
Lab Results   Component Value Date    HGBA1C 6 8 (H) 08/19/2021       Recent Labs     08/21/21  0737 08/21/21  1122 08/21/21  1457 08/21/21 2124   POCGLU 133 155* 218* 253*       Blood Sugar Average: Last 72 hrs:  (P) 168 3     Non-insulin dependent  Home meds metformin 1000mg daily, linagliptin 5mg daily (started on 6/25/21 for recurrent hyperglycemia)       Plan:    - hypoglycemic protocol   - diabetic diet level 2

## 2021-08-22 NOTE — DISCHARGE SUMMARY
[facility]  Discharge- Crispin Leaven 1960, 63 y o  female MRN: [id number]  Unit/Bed#: W -18 Encounter: [id number]  Primary Care Provider: Anamaria Rolon DO   Date and time admitted to hospital: 8/19/2021 10:07 AM    * UTI (urinary tract infection)  Assessment & Plan  · History of recurrent UTIs  On prophylactic nitrofurantoin daily per urology  · Past urine cultures speciated to pansensitive klebsiella (9/1/2020) and enterobacter resistant to nitrofurantoin but susceptible to ceftriaxone (7/26/2020)  · Did not meet Sepsis criteria on admission: , RR 20, WBC 8 59 w/o bandemia, afebrile   · Lactate elevated at 3 8 on admission  · Patient received ceftriaxone IV x3 days and last dose was on 08/21  Plan:   - lactic acid trending down  - urine and blood culture showed no growth to date  - afebrile since admission   - No leukocytosis    Hypokalemia-resolved as of 8/24/2021  Assessment & Plan  Recent Labs     08/22/21  2244 08/23/21  0637 08/24/21  0612   K 4 1 4 1 4 0     Potassium 3 2 on admission 4 1 this AM     Plan:   Resolved  Type 2 diabetes mellitus, without long-term current use of insulin [facility])  Assessment & Plan  Lab Results   Component Value Date    HGBA1C 6 8 (H) 08/19/2021       Recent Labs     08/23/21  0725 08/23/21  1107 08/23/21  1709 08/24/21  0722   POCGLU 248* 238* 111 160*       Blood Sugar Average: Last 72 hrs:  (P) 311 [id number]     Non-insulin dependent  Home meds metformin 1000mg daily, linagliptin 5mg daily (started on 6/25/21 for recurrent hyperglycemia)       Plan:  - hypoglycemic protocol   - diabetic diet level 2       Hypothyroidism  Assessment & Plan  TSH WNL  Continue home levothyroxine 25mcg daily     Hyperlipidemia  Assessment & Plan  Lab Results   Component Value Date    CHOLESTEROL 157 04/08/2021    TRIG 132 04/08/2021    HDL 47 04/08/2021    [address] 84 04/08/2021       Continue statin     Dysphagia  Assessment & Plan  · Moderate oral/pharyngeal dysphagia  Follows with GI    · EGD 10/26/20 with moderate gastritis and no esophageal stricture/narrowing or web seen  Pathology negative for h pylori, intestinal metaplasia, and EOE  · Honey thickened liquid diet per prior speech evaluation  Plan:   - diet: dysphagia 1 pureed, honey thick liquid; diabetic diet carb controlled level 2   - medications crushed   - patient cleared for discharge per speech eval    Hypernatremia  Assessment & Plan  Recent Labs     08/22/21  2244 08/23/21  0637 08/24/21  0612   SODIUM 144 144 147*      Resolved  Hypotension  Assessment & Plan  Blood Pressure: 115/62    · Patient was hypotensive this morning with blood pressure of 71/50          Constipation  Assessment & Plan  Chronic: Continue home regimen         Bipolar disorder (HCC)  Assessment & Plan  Continue home medication regimen:  - abilify 30mg at bedtime  - ativan 0 5mg at bedtime   - citalopram 40mg daily   - valproic acid 500mg BID   - trazodone 100mg at bedtime     Cerebral palsy [facility])  Assessment & Plan  Patient largely non-verbal at baseline     Gastroesophageal reflux disease  Assessment & Plan  Continue home PPI     Medical Problems     Resolved Problems  Date Reviewed: 8/24/2021        Resolved    Hypokalemia 8/24/2021     Resolved by  Rosa Isela Garcia MD    Hypercalcemia 8/22/2021     Resolved by  Rosa Isela Garcia MD              Discharging Resident: Rosa Isela Garcia MD  Discharging Attending: Cyn Omalley MD  PCP: Austin Logan DO  Admission Date:   Admission Orders (From admission, onward)     Ordered        08/20/21 1314  Inpatient Admission  Once         08/19/21 1635  Place in Observation  Once                   Discharge Date: 08/24/21    Consultations During Hospital Stay:  · Speech pathology    Procedures Performed:   · None    Significant Findings / Test Results:   · None    Incidental Findings:   · None    Test Results Pending at Discharge (will require follow up):   · None     Outpatient Tests Requested:  · None    Complications:  None    Reason for Admission:  Change in functional status secondary to suspected UTI    Hospital Course:   Piedad Parikh is a 63 y o  female patient who originally presented to the hospital on 8/19/2021 due to change in functional status secondary to suspected UTI  UA showed bacteria and white blood cells  Patient was treated empirically with IV ceftriaxone pending culture data  Urine culture show no growth to date  DC'ed IV antibiotics  Monitor clinically off antibiotics for 24 hours  Patient found have a sodium level of 147 this morning  Changed fluids from sodium chloride to D5 normal saline  F/u BMP normal prior to discharge  Plan to transfer to home institution for further therapy and monitoring  Please see above list of diagnoses and related plan for additional information  Condition at Discharge: fair    Discharge Day Visit / Exam:   * Please refer to separate progress note for these details *    Discussion with Family: Updated  (Care Provider) via phone  Discharge instructions/Information to patient and family:   See after visit summary for information provided to patient and family  Provisions for Follow-Up Care:  See after visit summary for information related to follow-up care and any pertinent home health orders  Disposition:   Other: Assisted living facility    Planned Readmission:  None    Discharge Medications:  See after visit summary for reconciled discharge medications provided to patient and/or family        **Please Note: This note may have been constructed using a voice recognition system**

## 2021-08-23 LAB
ANION GAP SERPL CALCULATED.3IONS-SCNC: 5 MMOL/L (ref 4–13)
BUN SERPL-MCNC: 24 MG/DL (ref 5–25)
CALCIUM SERPL-MCNC: 9 MG/DL (ref 8.3–10.1)
CHLORIDE SERPL-SCNC: 109 MMOL/L (ref 100–108)
CO2 SERPL-SCNC: 30 MMOL/L (ref 21–32)
CREAT SERPL-MCNC: 0.9 MG/DL (ref 0.6–1.3)
FLUAV RNA RESP QL NAA+PROBE: NEGATIVE
FLUBV RNA RESP QL NAA+PROBE: NEGATIVE
GFR SERPL CREATININE-BSD FRML MDRD: 70 ML/MIN/1.73SQ M
GLUCOSE SERPL-MCNC: 111 MG/DL (ref 65–140)
GLUCOSE SERPL-MCNC: 238 MG/DL (ref 65–140)
GLUCOSE SERPL-MCNC: 248 MG/DL (ref 65–140)
GLUCOSE SERPL-MCNC: 274 MG/DL (ref 65–140)
POTASSIUM SERPL-SCNC: 4.1 MMOL/L (ref 3.5–5.3)
RSV RNA RESP QL NAA+PROBE: NEGATIVE
SARS-COV-2 RNA RESP QL NAA+PROBE: NEGATIVE
SODIUM SERPL-SCNC: 144 MMOL/L (ref 136–145)

## 2021-08-23 PROCEDURE — 82948 REAGENT STRIP/BLOOD GLUCOSE: CPT

## 2021-08-23 PROCEDURE — 80048 BASIC METABOLIC PNL TOTAL CA: CPT

## 2021-08-23 PROCEDURE — 99232 SBSQ HOSP IP/OBS MODERATE 35: CPT | Performed by: INTERNAL MEDICINE

## 2021-08-23 PROCEDURE — 0241U HB NFCT DS VIR RESP RNA 4 TRGT: CPT

## 2021-08-23 RX ADMIN — DEXTROSE 100 ML/HR: 5 SOLUTION INTRAVENOUS at 01:59

## 2021-08-23 RX ADMIN — OXYBUTYNIN 5 MG: 5 TABLET, FILM COATED, EXTENDED RELEASE ORAL at 08:31

## 2021-08-23 RX ADMIN — CITALOPRAM HYDROBROMIDE 40 MG: 20 TABLET ORAL at 08:31

## 2021-08-23 RX ADMIN — INSULIN LISPRO 2 UNITS: 100 INJECTION, SOLUTION INTRAVENOUS; SUBCUTANEOUS at 13:31

## 2021-08-23 RX ADMIN — Medication 1000 UNITS: at 08:31

## 2021-08-23 RX ADMIN — INSULIN LISPRO 2 UNITS: 100 INJECTION, SOLUTION INTRAVENOUS; SUBCUTANEOUS at 08:42

## 2021-08-23 RX ADMIN — ENOXAPARIN SODIUM 40 MG: 40 INJECTION SUBCUTANEOUS at 08:32

## 2021-08-23 RX ADMIN — PANTOPRAZOLE SODIUM 20 MG: 40 TABLET, DELAYED RELEASE ORAL at 06:03

## 2021-08-23 RX ADMIN — FERROUS SULFATE TAB 325 MG (65 MG ELEMENTAL FE) 325 MG: 325 (65 FE) TAB at 08:31

## 2021-08-23 RX ADMIN — Medication 1 TABLET: at 12:15

## 2021-08-23 RX ADMIN — SODIUM CHLORIDE 500 ML: 0.9 INJECTION, SOLUTION INTRAVENOUS at 10:11

## 2021-08-23 RX ADMIN — LEVOTHYROXINE SODIUM 25 MCG: 25 TABLET ORAL at 06:03

## 2021-08-23 RX ADMIN — BACLOFEN 10 MG: 10 TABLET ORAL at 08:31

## 2021-08-23 RX ADMIN — VALPROIC ACID 500 MG: 250 SOLUTION ORAL at 08:32

## 2021-08-23 NOTE — PROGRESS NOTES
[address] 63 y o  female MRN: [id number]  Unit/Bed#: W -01 Encounter: [id number]  Reason for Consult:  Hypernatremia    ASSESSMENT and PLAN:    32-year-old female with a past medical history of cerebral palsy, diabetes, dysphagia, hypothyroid, bipolar disorder, hyperlipidemia, GERD, who initially presents with decreased activity and concern for urinary tract infection  There is initial concern for sepsis  Was initially started on intravenous antibiotics  There is also concern for constipation and bowel regimen was added  1) hypernatremia    -likely due to lack of free water and improved with D5W  -hold D5W and give saline due to need for resuscitation due to hypotension    Plan  -hold D5W  -give isotonic saline bolus now  -repeat vitals after bolus  -have reviewed with primary team   -may need to consider rechecking infectious workup  -recheck BMP in a m  From the renal standpoint    2) urinary tract infection-appears less likely based on    -history of recurrent UTI  -urinalysis with innumerable WBC, 4-10 RBC  -follows with Urology team and is on prophylactic nitrofurantoin as outpatient  -urine culture with mixed contaminant and antibiotics were discontinued per the primary team on August 21st urine culture    3) electrolytes    -initially with mild hypercalcemia-initial home calcium and vitamin-D regimen were held  -improved    -hypernatremia-required hypotonic fluids  See above  Improved    -hypokalemia-required repletion initially  Stable    4) acid/base-stable    5) renal function-creatinine fluctuates between 0 9-1 21 mg/dL  Baseline  6) hypotension-unclear etiology  Consider infectious workup  SUBJECTIVE / 24H INTERVAL HISTORY:    Hypotension 00R to 80 systolic  Urine output 1 1 L       OBJECTIVE:  Current Weight: Weight - Scale: 42 6 kg (93 lb 14 7 oz)  Vitals:    08/22/21 0713 08/22/21 1519 08/22/21 2120 08/23/21 0654   BP: 112/64 (!) 81/52 (!) 87/53 (!) 71/50   Pulse: (!) 54  69 (!) 50   Resp: 15 16 16 18   Temp: (!) 97 °F (36 1 °C) 98 2 °F (36 8 °C) 97 5 °F (36 4 °C) (!) 97 1 °F (36 2 °C)   TempSrc:       SpO2: 96%  94% 96%   Weight:       Height:           Intake/Output Summary (Last 24 hours) at 8/23/2021 0302  Last data filed at 8/23/2021 0900  Gross per 24 hour   Intake 1862 5 ml   Output 1100 ml   Net 762 5 ml     General: NAD  Skin: no rash  Eyes: anicteric sclera  Neck: supple  Chest: CTA b/l, no ronchii, no wheeze, no rubs, no rales  CVS: s1s2, no murmur, no gallop, no rub  Abdomen: soft, nl sounds  Extremities:  Trace edema LE b/l  : no boateng  Neuro:  Cannot fully assess  Psych:  Cannot fully assess  Nonverbal    Medications:    Current Facility-Administered Medications:     acetaminophen (TYLENOL) oral suspension 650 mg, 650 mg, Oral, Q6H PRN, Pk Zhu MD    ARIPiprazole (ABILIFY) tablet 30 mg, 30 mg, Oral, HS, Pk Zhu MD, 30 mg at 08/22/21 2027    baclofen tablet 10 mg, 10 mg, Oral, TID, Pk Zhu MD, 10 mg at 08/23/21 0831    bisacodyl (DULCOLAX) rectal suppository 10 mg, 10 mg, Rectal, Daily PRN, Pk Zhu MD    calcium carbonate-vitamin D (OSCAL-D) 500 mg-200 units per tablet 1 tablet, 1 tablet, Oral, BID With Meals, Maryse Gary MD, 1 tablet at 08/22/21 2027    cholecalciferol (VITAMIN D3) tablet 1,000 Units, 1,000 Units, Oral, Daily, Maryse Gary MD, 1,000 Units at 08/23/21 0831    citalopram (CeleXA) tablet 40 mg, 40 mg, Oral, QAM, Pk Zhu MD, 40 mg at 08/23/21 0831    dextrose 5 % infusion, 100 mL/hr, Intravenous, Continuous, Juan R Nichols MD, Last Rate: 100 mL/hr at 08/23/21 0159, 100 mL/hr at 08/23/21 0159    enoxaparin (LOVENOX) subcutaneous injection 40 mg, 40 mg, Subcutaneous, Q24H [address], Pk Zhu MD, 40 mg at 08/23/21 2353    ferrous sulfate tablet 325 mg, 325 mg, Oral, BID With Meals, Pk Zhu MD, 325 mg at 08/23/21 0831    insulin lispro (HumaLOG) 100 units/mL subcutaneous injection 1-5 Units, 1-5 Units, Subcutaneous, TID AC, 2 Units at 08/23/21 0842 **AND** Fingerstick Glucose (POCT), , , TID AC, Severo Munch, MD    levothyroxine tablet 25 mcg, 25 mcg, Oral, Early Morning, Malcolm Alvarez MD, 25 mcg at 08/23/21 0603    LORazepam (ATIVAN) tablet 0 5 mg, 0 5 mg, Oral, HS, Malcolm Alvarez MD, 0 5 mg at 08/22/21 2151    oxybutynin (DITROPAN-XL) 24 hr tablet 5 mg, 5 mg, Oral, Daily, Malcolm Alvarez MD, 5 mg at 08/23/21 0831    pantoprazole (PROTONIX) EC tablet 20 mg, 20 mg, Oral, BID AC, Malcolm Alvarez MD, 20 mg at 08/23/21 0603    polyethylene glycol (MIRALAX) packet 17 g, 17 g, Oral, Daily, Malcolm Alvarez MD, 17 g at 08/21/21 0841    pravastatin (PRAVACHOL) tablet 40 mg, 40 mg, Oral, Daily With Charlotte Ledezma MD, 40 mg at 08/22/21 1741    senna (SENOKOT) tablet 17 2 mg, 2 tablet, Oral, HS, Malcolm Alvarez MD, 17 2 mg at 08/22/21 2150    traZODone (DESYREL) tablet 100 mg, 100 mg, Oral, HS, Malcolm Alvarez MD, 100 mg at 08/22/21 2026    valproic acid (DEPAKENE) oral soln 500 mg, 500 mg, Oral, BID, Malcolm Alvarez MD, 500 mg at 08/23/21 2173    Laboratory Results:  Results from last 7 days   Lab Units 08/23/21  0637 08/22/21  2244 08/22/21  0541 08/21/21  0508 08/20/21  1454 08/20/21  0440 08/19/21  1029   WBC Thousand/uL  --   --  5 47 5 34  --  7 74 8 59   HEMOGLOBIN g/dL  --  10 3* 10 4* 10 3*  --  10 4* 11 8   HEMATOCRIT %  --  31 8* 32 7* 31 4*  --  32 2* 36 8   PLATELETS Thousands/uL  --   --  127* 129*  --  131* 143*   POTASSIUM mmol/L 4 1 4 1 3 6 3 9 3 7 4 0 3 2*   CHLORIDE mmol/L 109* 109* 112* 112* 110* 110* 103   CO2 mmol/L 30 29 30 30 35* 31 32   BUN mg/dL 24 24 26* 18 11 12 11   CREATININE mg/dL 0 90 0 99 0 97 0 93 0 89 0 90 1 05   CALCIUM mg/dL 9 0 8 5 8 1* 8 2* 8 2* 8 5 9 8

## 2021-08-23 NOTE — UTILIZATION REVIEW
Inpatient Admission Authorization Request   NOTIFICATION OF INPATIENT ADMISSION/INPATIENT AUTHORIZATION REQUEST   SERVICING FACILITY:   [address]  Tax ID: [id number]  NPI: [id number]  Place of Service: Inpatient [address]  Place of Service Code: 24     ATTENDING PROVIDER:  Attending Name and NPI#: Jimmy De La Vega Md [[id number]]  Address: [address]  Phone: [phone]     UTILIZATION REVIEW CONTACT:  Magalie Estevez Utilization   Network Utilization Review Department  Phone: [phone]  Fax: [phone]  Email: [email or web address]     PHYSICIAN ADVISORY SERVICES:  FOR [facility] REVIEW - MEDICAL NECESSITY DENIAL  Phone: [phone]  Fax: [phone]  Email: [email or web address]     TYPE OF REQUEST:  Inpatient Status     ADMISSION INFORMATION:  ADMISSION DATE/TIME: 8/20/21  1:14 PM  PATIENT DIAGNOSIS CODE/DESCRIPTION:  UTI (urinary tract infection) [N39 0]  Altered mental status [R41 82]  Constipation [K59 00]  Change in mental status [R41 82]  DISCHARGE DATE/TIME: No discharge date for patient encounter  DISCHARGE DISPOSITION (IF DISCHARGED): Home/Self Care     IMPORTANT INFORMATION:  Please contact the Magalie Estevez directly with any questions or concerns regarding this request  Department voicemails are confidential     Send requests for admission clinical reviews, concurrent reviews, approvals, and administrative denials due to lack of clinical to fax [phone]             Initial Clinical Review  OBSERVATION ADMISSION 8/19/2021 1635 CONVERTED TO INPATIENT ADMISSION 8/20/2021 1314 DUE TO ONGOING MANAGEMENT OF UTI REQ IV ANTIBX, HYPERCALCEMIA & HYPOKALEMIA IN A NON VERBAL , CEREBRAL PALSY PATIENT  Admission: Date/Time/Statement:   08/20/21 1315  Inpatient Admission Once     Transfer Service: Hospitalist       Question Answer Comment   Level of Care Med Surg Estimated length of stay Not Applicable        39/48/06 1314     ED Arrival Information     Expected Arrival Acuity    - 8/19/2021 10:03 Emergent         Means of arrival Escorted by Service Admission type    [facility] Emergency         Arrival complaint    change in mental status        Chief Complaint   Patient presents with    Altered Mental Status     caregiver reports pt is not following commands like she usually does  Pt is nonverbal at baseline  cargiver suspects pt may have aspiration pneumonia       Initial Presentation: 62 yo female PMH of  cerebral palsy nonverbal at baseline, frequent UTIs, type 2 diabetes non-insulin dependent, dysphagia, bipolar disorder, HLD, GERD to ED from [address] [place] [address] presents w decr activity level, altered mental status  & concern for UTI per PCP at [facility]   Baseline w feisty character & will follow commands, is incontinent at baseline, ambulates w heavy assist on modified diet  Per caretaker patient w decline in activities for 2 days, less responsive, increasingly tired w/ increasingly sleeping, temps ranged 98- 99F   IN ED UA concerning for UTI, hypercalcemia, hypokalemia  Admit Observation due to UTI, hypercalcemia, hypokalemia, constipation  Cont gentle IVF, IV antibx, follow urine/ bld cultures, repeat lactic acid/ BMP  Home home calcium carbonate & vit D  Attending PM update  Appears chronically ill, /63, dry mucous membranes; extremities contracted, non verbal   Hypercalcemia due to dehydration likely cont IV hydration  Senna & dulcolax for constipation  Date: 8/20/2021  Day 2:   Provider  Cont IV ceftriaxone & holding prophylactic Nitrofurantoin; follow cultures; trend BMP, SSI    Exam disoriented; w weakness; non verbal at baseline  ED Triage Vitals   Temperature Pulse Respirations Blood Pressure SpO2   08/19/21 1013 08/19/21 1013 08/19/21 1013 08/19/21 1013 08/19/21 1021   97 6 °F (36 4 °C) 102 20 (!) 180/64 95 %      Temp Source Heart Rate Source Patient Position - Orthostatic VS BP Location FiO2 (%)   08/19/21 1013 08/19/21 1013 08/19/21 1013 08/19/21 1013 --   Axillary Monitor Lying Right leg       Pain Score       08/19/21 1400       No Pain          Wt Readings from Last 1 Encounters:   08/19/21 42 6 kg (93 lb 14 7 oz)     Additional Vital Signs:   Date/Time  Temp  Pulse  Resp  BP  MAP (mmHg)  SpO2  O2 Device  Patient Position - Orthostatic VS   08/20/21 07:21:21  98 2 °F (36 8 °C)  66  18  88/56Abnormal   67  91 %  --  --   08/20/21 0500  --  67  --  90/57  --  --  --  --   08/19/21 21:34:54  --  75  --  91/60  70  91 %  --  --   08/19/21 21:24:32  98 3 °F (36 8 °C)  81  16  88/58Abnormal   68  94 %  --  --   08/19/21 19:38:23  98 3 °F (36 8 °C)  83  16  114/64  81  92 %  None (Room air)  Lying   08/19/21 1900  98 °F (36 7 °C)  84  16  109/69  83  93 %  None (Room air)  Lying   08/19/21 1800  --  80  16  111/61  80  93 %  None (Room air)  --   08/19/21 1730  --  82  17  120/56  80  94 %  None (Room air)  --   08/19/21 1630  --  78  16  118/64  81  95 %  None (Room air)  --   08/19/21 1600  --  82  17  139/62  90  95 %  None (Room air)  --   08/19/21 1544  --  81  --  120/64  --  92 %  None (Room air)  Lying   08/19/21 1400  --  80  18  110/70  --  98 %  None (Room air)  --   08/19/21 1127  --  83  --  108/64  --  95 %  None (Room air)  Lying   08/19/21 1045  --  86  20  128/58  83  95 %  None (Room air)  Lying   08/19/21 1021  --  --  --  --  --  95 %  None (Room air)  --   08/19/21 1013  97 6 °F (36 4 °C)  102  20  180/64Abnormal   --  --  --  Lying      Weights (last 14 days)    Date/Time  Weight  Weight Method  Height   08/19/21 1936  42 6 kg (93 lb 14 7 oz)  Bed scale  4' 10" (1 473 m)   08/19/21 1900  41 7 kg (92 lb)  Stated  4' 10" (1 473 m)       Pertinent Labs/Diagnostic Test Results:   Results from last 7 days   Lab Units 08/19/21  1147   SARS-COV-2  Negative     Results from last 7 days   Lab Units 08/22/21  2244 08/22/21  0539 08/21/21  0508 08/20/21  0440 08/19/21  1029   WBC Thousand/uL  --  5 47 5 34 7 74 8 59   HEMOGLOBIN g/dL 10 3* 10 4* 10 3* 10 4* 11 8   HEMATOCRIT % 31 8* 32 7* 31 4* 32 2* 36 8   PLATELETS Thousands/uL  --  127* 129* 131* 143*   NEUTROS ABS Thousands/µL  --   --   --  3 53 5 31         Results from last 7 days   Lab Units 08/23/21  0637 08/22/21  2244 08/22/21  0541 08/21/21  1426 08/21/21  0508 08/20/21  1454   SODIUM mmol/L 144 144 147* 149* 147* 147*   POTASSIUM mmol/L 4 1 4 1 3 6  --  3 9 3 7   CHLORIDE mmol/L 109* 109* 112*  --  112* 110*   CO2 mmol/L 30 29 30  --  30 35*   ANION GAP mmol/L 5 6 5  --  5 2*   BUN mg/dL 24 24 26*  --  18 11   CREATININE mg/dL 0 90 0 99 0 97  --  0 93 0 89   EGFR ml/min/1 73sq m 70 63 64  --  67 71   CALCIUM mg/dL 9 0 8 5 8 1*  --  8 2* 8 2*     Results from last 7 days   Lab Units 08/19/21  1029   AST U/L 8   ALT U/L 11*   ALK PHOS U/L 57   TOTAL PROTEIN g/dL 6 9   ALBUMIN g/dL 2 9*   TOTAL BILIRUBIN mg/dL 0 17*     Results from last 7 days   Lab Units 08/23/21  0725 08/22/21  2113 08/22/21  1623 08/22/21  1117 08/22/21  0745 08/21/21  2124 08/21/21  1457 08/21/21  1122 08/21/21  0737 08/20/21  2109 08/20/21  1606 08/20/21  1057   POC GLUCOSE mg/dl 248* 235* 260* 159* 186* 253* 218* 155* 133 210* 108 166*     Results from last 7 days   Lab Units 08/23/21  0637 08/22/21  2244 08/22/21  0541 08/21/21  0508 08/20/21  1454 08/20/21  0440 08/19/21  1029   GLUCOSE RANDOM mg/dL 274* 316* 190* 162* 106 95 185*         Results from last 7 days   Lab Units 08/19/21  1029   HEMOGLOBIN A1C % 6 8*   EAG mg/dl 148     No results found for: BETA-HYDROXYBUTYRATE                   Results from last 7 days   Lab Units 08/19/21  1029   TROPONIN I ng/mL <0 02             Results from last 7 days   Lab Units 08/19/21  1029   TSH 3RD GENERATON uIU/mL 2 184         Results from last 7 days   Lab Units 08/22/21  2244 08/22/21  1930 08/22/21  1638 08/19/21  1808 08/19/21  1541   LACTIC ACID mmol/L 1 8 2  8* 2 1* 2 6* 3 8*                         Results from last 7 days   Lab Units 08/19/21  1029   LIPASE u/L 173             Results from last 7 days   Lab Units 08/19/21  1356   CLARITY UA  Turbid   COLOR UA  Light Yellow   SPEC GRAV UA  1 010   PH UA  6 0   GLUCOSE UA mg/dl 250 (1/4%)*   KETONES UA mg/dl Negative   BLOOD UA  Trace-Intact*   PROTEIN UA mg/dl Negative   NITRITE UA  Negative   BILIRUBIN UA  Negative   UROBILINOGEN UA E U /dl 0 2   LEUKOCYTES UA  Moderate*   WBC UA /hpf Innumerable*   RBC UA /hpf 4-10*   BACTERIA UA /hpf Innumerable*   EPITHELIAL CELLS WET PREP /hpf Moderate*                                 Results from last 7 days   Lab Units 08/19/21  1550 08/19/21  1540 08/19/21  1356   BLOOD CULTURE  No Growth at 72 hrs  No Growth at 72 hrs   --    URINE CULTURE   --   --  <10,000 cfu/ml      CT head without contrast   Final Result by , MD (08/19 1308)      No acute intracranial abnormality  CT abdomen pelvis with contrast   Final Result by  MD (08/19 1319)      Evidence of significant fecal stasis with probable impaction in the rectum  There is distention of the left colon probably related to this distal obstruction whereas the right colon is not as distended nor is there is small bowel distention  Bibasilar airspace opacities are similar to the prior CT and may be related atelectasis  Mild bladder wall thickening could be related to inflammation and should be correlated with urinalysis  XR chest 1 view portable   Final Result by , MD (08/19 4481)      No acute cardiopulmonary disease       8/19 ekg nsr     ED Treatment:   Medication Administration from 08/19/2021 1002 to 08/19/2021 1932       Date/Time Order Dose Route Action     08/19/2021 1147 sodium chloride 0 9 % bolus 500 mL 500 mL Intravenous New Bag     08/19/2021 1621 ceftriaxone (ROCEPHIN) 1 g/50 mL in dextrose IVPB 1,000 mg Intravenous New Bag        Past Medical History:   Diagnosis Date    Adjustment disorder     Altered mental status 12/11/2015    Anemia     Bipolar 1 disorder (HCC)     Cerebral palsy ([facility])     Chronic hypernatremia 2/6/2016    Closed fracture of left hip ([facility]) 1/19/2016    Closed left hip fracture ([facility])     no surgery    Constipation     Dehydration 2/20/2016    Diabetes mellitus ([facility] )     Disease of thyroid gland     Diverticulosis     Fracture of multiple ribs of right side     Hip fracture ([facility]) 07/26/2015    left    Hyperlipidemia     Hypernatremia 12/28/2018    Hypotension     Impulse control disorder     Incontinence     Intellectual disability due to developmental disorder, unspecified     Microalbuminuria     Osteopathia     Osteoporosis     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[facility])     Thrombocytopenia ([facility] ) 7/31/2015     Present on Admission:   Hypokalemia   Constipation   Type 2 diabetes mellitus, without long-term current use of insulin ([facility])   Dysphagia   Cerebral palsy ([facility])   Hyperlipidemia   Gastroesophageal reflux disease   Bipolar disorder ([facility] )   Hypothyroidism   Hypernatremia      Admitting Diagnosis: UTI (urinary tract infection) [N39 0]  Altered mental status [R41 82]  Constipation [K59 00]  Change in mental status [R41 82]  Age/Sex: 63 y o  female  Admission Orders:  Contact & airborne isolation  Ambulate patient     Scheduled Medications:  ARIPiprazole, 30 mg, Oral, HS  baclofen, 10 mg, Oral, TID  cefTRIAXone, 1,000 mg, Intravenous, Q24H  citalopram, 40 mg, Oral, QAM  enoxaparin, 40 mg, Subcutaneous, Q24H [facility]  ferrous sulfate, 325 mg, Oral, BID With Meals  insulin lispro, 1-5 Units, Subcutaneous, TID AC  levothyroxine, 25 mcg, Oral, Early Morning  LORazepam, 0 5 mg, Oral, HS  oxybutynin, 5 mg, Oral, Daily  pantoprazole, 20 mg, Oral, BID AC  polyethylene glycol, 17 g, Oral, Daily  pravastatin, 40 mg, Oral, Daily With Dinner  senna, 2 tablet, Oral, HS  traZODone, 100 mg, Oral, HS  valproic acid, 500 mg, Oral, BID      Continuous IV Infusions:  dextrose, 50 mL/hr, Intravenous, Continuous      PRN Meds:  acetaminophen, 650 mg, Oral, Q6H PRN  bisacodyl, 10 mg, Rectal, Daily PRN    Network Utilization Review Department  ATTENTION: Please call with any questions or concerns to [phone] and carefully listen to the prompts so that you are directed to the right person  All voicemails are confidential   Arley Livia all requests for admission clinical reviews, approved or denied determinations and any other requests to dedicated fax number below belonging to the campus where the patient is receiving treatment   List of dedicated fax numbers for the Facilities:  [address] DENIALS (Administrative/Medical Necessity) [phone]   [address] (Maternity/NICU/Pediatrics) [phone]   [address] [place] [address] [facility] [address] [address] [address] [facility] [place] [address] [phone]          Ciara Diaz RN   Registered Nurse   Specialty:  Utilization Review   Utilization Review       Signed   Date of Service:  8/21/2021  8:15 PM               Signed             Show:Clear all  [x]Manual[x]Template[x]Copied    Added by:  Jagjit Ahumada RN    [][facility] for details  Continued Stay Review     Date: 8/21/2021                           Current Patient Class: ip                  Current Level of Care: med surg      HPI:61 y o  female initially admitted [address] [facility] [facility] [address] [facility] 8/19/2021 [place] [address] 8/20/2021 1314 DUE TO ONGOING MANAGEMENT OF UTI REQ IV ANTIBX, HYPERCALCEMIA & HYPOKALEMIA IN A NON VERBAL , CEREBRAL PALSY PATIENT     Assessment/Plan: Urine cultures w mixed contaminant; DC IV Rocephin & cont to monitor clinically; serum NA levels elevated; initiate IV D5W for hydration & monitor accu checks; adjust insulin as needed; serial BMPs       Vital Signs:   Date/Time   Temp   Pulse   Resp   BP   MAP (mmHg)   SpO2   O2 Device   Patient Position - Orthostatic VS   08/21/21 14:33:15   97 9 °F (36 6 °C)   73   18   97/58   71   95 %   --   --   08/21/21 0826   --   56   --   87/54Abnormal    65   96 %   --   --   08/21/21 07:24:05   97 3 °F (36 3 °C)Abnormal    57   15   86/55Abnormal    65   93 %   --   --   08/20/21 21:11:19   98 9 °F (37 2 °C)   72   18   96/62   73   92 %   None (Room air)   Lying            Pertinent Labs/Diagnostic Results:        Results from last 7 days   Lab Units 08/19/21  1147   SARS-COV-2   Negative             Results from last 7 days   Lab Units 08/21/21  0508 08/20/21  0440 08/19/21  1029   WBC Thousand/uL 5 34 7 74 8 59   HEMOGLOBIN g/dL 10 3* 10 4* 11 8   HEMATOCRIT % 31 4* 32 2* 36 8   PLATELETS Thousands/uL 129* 131* 143*   NEUTROS ABS Thousands/µL  --  3 53 5 31                   Results from last 7 days   Lab Units 08/21/21  1426 08/21/21  0508 08/20/21  1454 08/20/21  0440 08/19/21  1029   SODIUM mmol/L 149* 147* 147* 148* 144   POTASSIUM mmol/L  --  3 9 3 7 4 0 3 2*   CHLORIDE mmol/L  --  112* 110* 110* 103   CO2 mmol/L  --  30 35* 31 32   ANION GAP mmol/L  --  5 2* 7 9   BUN mg/dL  --  18 11 12 11   CREATININE mg/dL  --  0 93 0 89 0 90 1 05   EGFR ml/min/1 73sq m  --  67 71 70 58   CALCIUM mg/dL  --  8 2* 8 2* 8 5 9 8           Results from last 7 days   Lab Units 08/19/21  1029   AST U/L 8   ALT U/L 11*   ALK PHOS U/L 57   TOTAL PROTEIN g/dL 6 9   ALBUMIN g/dL 2 9*   TOTAL BILIRUBIN mg/dL 0 17*                   Results from last 7 days   Lab Units 08/21/21  1457 08/21/21  1122 08/21/21  0737 08/20/21  2049 08/20/21  1606 08/20/21  1057 08/20/21  0721 08/19/21  2052 08/19/21  1029   POC GLUCOSE mg/dl 218* 155* 133 210* 108 166* 111 138 191*              Results from last 7 days   Lab Units 08/21/21  0508 08/20/21  1454 08/20/21  0440 08/19/21  1029   GLUCOSE RANDOM mg/dL 162* 106 95 185*               Results from last 7 days   Lab Units 08/19/21  1029   HEMOGLOBIN A1C % 6 8*   EAG mg/dl 148      No results found for: BETA-HYDROXYBUTYRATE                        Results from last 7 days   Lab Units 08/19/21  1029   TROPONIN I ng/mL <0 02              Results from last 7 days   Lab Units 08/19/21  1029   TSH 3RD GENERATON uIU/mL 2  184                Results from last 7 days   Lab Units 08/19/21  1808 08/19/21  1541   LACTIC ACID mmol/L 2 6* 3 8*               Results from last 7 days   Lab Units 08/19/21  1029   LIPASE u/L 173                   Results from last 7 days   Lab Units 08/19/21  1356   CLARITY UA   Turbid   COLOR UA   Light Yellow   SPEC GRAV UA   1 010   PH UA   6 0   GLUCOSE UA mg/dl 250 (1/4%)*   KETONES UA mg/dl Negative   BLOOD UA   Trace-Intact*   PROTEIN UA mg/dl Negative   NITRITE UA   Negative   BILIRUBIN UA   Negative   UROBILINOGEN UA E U /dl 0 2   LEUKOCYTES UA   Moderate*   WBC UA /hpf Innumerable*   RBC UA /hpf 4-10*   BACTERIA UA /hpf Innumerable*   EPITHELIAL CELLS WET PREP /hpf Moderate*                 Results from last 7 days   Lab Units 08/19/21  1550 08/19/21  1540 08/19/21  1356   BLOOD CULTURE   No Growth at 24 hrs   No Growth at 24 hrs   --    URINE CULTURE    --   --  <10,000 cfu/ml         Medications:   Scheduled Medications:  ARIPiprazole, 30 mg, Oral, HS  baclofen, 10 mg, Oral, TID  citalopram, 40 mg, Oral, QAM  enoxaparin, 40 mg, Subcutaneous, Q24H ROSEMARY  ferrous sulfate, 325 mg, Oral, BID With Meals  insulin lispro, 1-5 Units, Subcutaneous, TID AC  levothyroxine, 25 mcg, Oral, Early Morning  LORazepam, 0 5 mg, Oral, HS  oxybutynin, 5 mg, Oral, Daily  pantoprazole, 20 mg, Oral, BID AC  polyethylene glycol, 17 g, Oral, Daily  pravastatin, 40 mg, Oral, Daily With Dinner  senna, 2 tablet, Oral, HS  traZODone, 100 mg, Oral, HS  valproic acid, 500 mg, Oral, BID  Continuous IV Infusions:  sodium chloride, 75 mL/hr, Intravenous, Continuous     PRN Meds:  acetaminophen, 650 mg, Oral, Q6H PRN  bisacodyl, 10 mg, Rectal, Daily PRN     Discharge Plan: tbd  Network Utilization Review Department  ATTENTION: Please call with any questions or concerns to [phone] and carefully listen to the prompts so that you are directed to the right person  All voicemails are confidential   Lucinda Rosas all requests for admission clinical reviews, approved or denied determinations and any other requests to dedicated fax number below belonging to the campus where the patient is receiving treatment   List of dedicated fax numbers for the Facilities:  [address] DENIALS (Administrative/Medical Necessity) [phone]   [address] (Maternity/NICU/Pediatrics) [phone]   [address] Sharla Estevesel [place] 1368 [address] [place] [address] Vinod Morales [address] [facility] [facility] [phone]

## 2021-08-23 NOTE — ASSESSMENT & PLAN NOTE
Recent Labs     08/22/21  0541 08/22/21  2244 08/23/21  0637   K 3 6 4 1 4 1     Potassium 3 2 on admission    Plan:   Resolved

## 2021-08-23 NOTE — PROGRESS NOTES
[address] 1960, 63 y o  female MRN: [id number]  Unit/Bed#: W -50 Encounter: [id number]  Primary Care Provider: Geena Lim DO   Date and time admitted to hospital: 8/19/2021 10:07 AM    Dysphagia  Assessment & Plan  · Moderate oral/pharyngeal dysphagia  Follows with GI    · EGD 10/26/20 with moderate gastritis and no esophageal stricture/narrowing or web seen  Pathology negative for h pylori, intestinal metaplasia, and EOE  · Honey thickened liquid diet per prior speech evaluation  Plan:   - diet: dysphagia 1 pureed, honey thick liquid; diabetic diet carb controlled level 2   - medications crushed   - patient cleared for discharge per speech eval    Hypokalemia  Assessment & Plan  Recent Labs     08/22/21  0541 08/22/21  2244 08/23/21  0637   K 3 6 4 1 4 1     Potassium 3 2 on admission    Plan:   Resolved  Hypernatremia  Assessment & Plan  Recent Labs     08/22/21  0541 08/22/21  2244 08/23/21  0637   SODIUM 147* 144 144      Resolved  Hypothyroidism  Assessment & Plan  TSH WNL  Continue home levothyroxine 25mcg daily     Hypotension  Assessment & Plan  Blood Pressure: 108/68    · Patient was hypotensive this morning with blood pressure of 71/50  Plan:  · Discontinue D5 per Nephrology team recommendation and patient given  mL bolus x1  · Monitor vitals Q4h  Constipation  Assessment & Plan  CT A/P w/ contrast (8/19): Evidence of significant fecal stasis with probable impaction in the rectum  There is distention of the left colon probably related to this distal obstruction whereas the right colon is not as distended nor is there is small bowel distention  Per caretaker, she did have four bowel movements yesterday that were not fully formed       Plan:  - continue home Miralax 17g daily   - senna scheduled while inpatient   - ducolax suppository given     Type 2 diabetes mellitus, without long-term current use of insulin [facility])  Assessment & Plan  Lab Results   Component Value Date    HGBA1C 6 8 (H) 08/19/2021       Recent Labs     08/22/21  1623 08/22/21  2113 08/23/21  0725 08/23/21  1107   POCGLU 260* 235* 248* 238*       Blood Sugar Average: Last 72 hrs:  (P) 191 [id number]     Non-insulin dependent  Home meds metformin 1000mg daily, linagliptin 5mg daily (started on 6/25/21 for recurrent hyperglycemia)  Plan:  - hypoglycemic protocol   - diabetic diet level 2   - discontinue D5 per Nephrology team recommendations and patient started on  mL bolus x1  Bipolar disorder ([address] )  Assessment & Plan  Continue home medication regimen:  - abilify 30mg at bedtime  - ativan 0 5mg at bedtime   - citalopram 40mg daily   - valproic acid 500mg BID   - trazodone 100mg at bedtime     Cerebral palsy [facility])  Assessment & Plan  Patient largely non-verbal at baseline     Gastroesophageal reflux disease  Assessment & Plan  Continue home PPI     Hyperlipidemia  Assessment & Plan  Lab Results   Component Value Date    CHOLESTEROL 157 04/08/2021    TRIG 132 04/08/2021    HDL 47 04/08/2021    [address] 84 04/08/2021       Continue statin     * UTI (urinary tract infection)  Assessment & Plan  · History of recurrent UTIs  On prophylactic nitrofurantoin daily per urology  · Past urine cultures speciated to pansensitive klebsiella (9/1/2020) and enterobacter resistant to nitrofurantoin but susceptible to ceftriaxone (7/26/2020)  · Unlikely MDRO given past cultures  · Did not meet Sepsis criteria on admission: , RR 20, WBC 8 59 w/o bandemia, afebrile   · Lactate elevated at 3 8 on admission  · Patient received ceftriaxone IV x3 days and last dose was on 08/21  Plan:   - lactic acid trending down  - urine and blood culture showed no growth to date  - afebrile since admission   - No leukocytosis        VTE Pharmacologic Prophylaxis:   VTE Score: 4 Moderate Risk (Score 3-4) - Pharmacological DVT Prophylaxis Contraindicated  Sequential Compression Devices Ordered  Mechanical VTE Prophylaxis in Place: Yes    Patient Centered Rounds: I have performed bedside rounds with nursing staff today  Discussions with Specialists or Other Care Team Provider:  Nephrology team    Education and Discussions with Family / Patient: Updated  (Facility staff) via phone  Current Length of Stay: 3 day(s)    Current Patient Status: Inpatient     Discharge Plan / Estimated Discharge Date: Anticipate discharge tomorrow to rehab facility  Code Status: Level 1 - Full Code      Subjective:   Patient was awake and sitting up in bed  Though she is nonverbal she does respond with nonverbal cues when spoken to  She has mild intermittent dry cough that was noted since admission  She does not appear to be in any distress and was not reported by nursing staff to be febrile  She was reported to be hypertensive overnight  Review of systems not performed due to mental status  Objective:     Vitals:   Temp (24hrs), Av 8 °F (36 6 °C), Min:97 1 °F (36 2 °C), Max:98 8 °F (37 1 °C)    Temp:  [97 1 °F (36 2 °C)-98 8 °F (37 1 °C)] 98 8 °F (37 1 °C)  HR:  [50-69] 57  Resp:  [16-18] 18  BP: ()/(50-68) 108/68  SpO2:  [94 %-97 %] 97 %  Body mass index is 19 63 kg/m²  Input and Output Summary (last 24 hours): Intake/Output Summary (Last 24 hours) at 2021 1527  Last data filed at 2021 1300  Gross per 24 hour   Intake 1170 ml   Output 600 ml   Net 570 ml       Physical Exam:     Physical Exam  Vitals and nursing note reviewed  Constitutional:       General: She is not in acute distress  Appearance: Normal appearance  She is normal weight  She is not ill-appearing, toxic-appearing or diaphoretic  HENT:      Head: Normocephalic and atraumatic  Nose: Nose normal    Eyes:      General: No scleral icterus  Right eye: No discharge  Left eye: No discharge        Extraocular Movements: Extraocular movements intact  Pupils: Pupils are equal, round, and reactive to light  Neck:      Vascular: No carotid bruit  Cardiovascular:      Rate and Rhythm: Normal rate and regular rhythm  Pulses: Normal pulses  Heart sounds: Normal heart sounds  No murmur heard  No friction rub  No gallop  Pulmonary:      Effort: Pulmonary effort is normal       Breath sounds: Normal breath sounds  No wheezing, rhonchi or rales  Abdominal:      General: Bowel sounds are normal       Palpations: Abdomen is soft  Tenderness: There is no abdominal tenderness  There is no guarding or rebound  Musculoskeletal:         General: Deformity (Chronic congenital) present  No swelling or tenderness  Cervical back: Normal range of motion and neck supple  No rigidity  No muscular tenderness  Right lower leg: No edema  Left lower leg: No edema  Skin:     Capillary Refill: Capillary refill takes less than 2 seconds  Coloration: Skin is not jaundiced  Findings: No bruising, erythema, lesion or rash  Neurological:      General: No focal deficit present  Mental Status: She is alert  Mental status is at baseline  Motor: Weakness (At baseline) present  Psychiatric:         Mood and Affect: Mood normal       Comments: Unable to assess as patient is nonverbal at baseline            Additional Data:     Labs:  Results from last 7 days   Lab Units 08/22/21  2244 08/22/21  0541 08/20/21  0440   WBC Thousand/uL  --  5 47 7 74   HEMOGLOBIN g/dL 10 3* 10 4* 10 4*   HEMATOCRIT % 31 8* 32 7* 32 2*   PLATELETS Thousands/uL  --  127* 131*   NEUTROS PCT %  --   --  46   LYMPHS PCT %  --   --  42   MONOS PCT %  --   --  7   EOS PCT %  --   --  5     Results from last 7 days   Lab Units 08/23/21  0637 08/19/21  1029   SODIUM mmol/L 144 144   POTASSIUM mmol/L 4 1 3 2*   CHLORIDE mmol/L 109* 103   CO2 mmol/L 30 32   BUN mg/dL 24 11   CREATININE mg/dL 0 90 1 05   ANION GAP mmol/L 5 9   CALCIUM mg/dL 9 0 9 8 ALBUMIN g/dL  --  2 9*   TOTAL BILIRUBIN mg/dL  --  0 17*   ALK PHOS U/L  --  57   ALT U/L  --  11*   AST U/L  --  8   GLUCOSE RANDOM mg/dL 274* 185*         Results from last 7 days   Lab Units 08/23/21  1107 08/23/21  0725 08/22/21  2113 08/22/21  1623 08/22/21  1117 08/22/21  0745 08/21/21  2124 08/21/21  1457 08/21/21  1122 08/21/21  0737 08/20/21  2109 08/20/21  1606   POC GLUCOSE mg/dl 238* 248* 235* 260* 159* 186* 253* 218* 155* 133 210* 108     Results from last 7 days   Lab Units 08/19/21  1029   HEMOGLOBIN A1C % 6 8*     Results from last 7 days   Lab Units 08/22/21  2244 08/22/21  1930 08/22/21  1638 08/19/21  1808   LACTIC ACID mmol/L 1 8 2 8* 2 1* 2 6*       Imaging: No pertinent imaging reviewed  Recent Cultures (last 7 days):     Results from last 7 days   Lab Units 08/19/21  1550 08/19/21  1540 08/19/21  1356   BLOOD CULTURE  No Growth at 72 hrs   No Growth at 72 hrs   --    URINE CULTURE   --   --  <10,000 cfu/ml        Lines/Drains:  Invasive Devices     Peripheral Intravenous Line            Peripheral IV 08/19/21 Right Antecubital 4 days                Telemetry:        Last 24 Hours Medication List:   Current Facility-Administered Medications   Medication Dose Route Frequency Provider Last Rate    acetaminophen  650 mg Oral Q6H PRN Cleve Zamora MD      ARIPiprazole  30 mg Oral HS Cleve Zamora MD      baclofen  10 mg Oral TID Cleve Zamora MD      bisacodyl  10 mg Rectal Daily PRN Cleve Zamora MD      calcium carbonate-vitamin D  1 tablet Oral BID With Meals Meredith Butler MD      cholecalciferol  1,000 Units Oral Daily Meredith Butler MD      citalopram  40 mg Oral QAM Cleve Zamora MD      enoxaparin  40 mg Subcutaneous Q24H [address] Meron Kimbrough MD      ferrous sulfate  325 mg Oral BID With Meals Cleve Zamora MD      insulin lispro  1-5 Units Subcutaneous TID Panda Yoo MD      levothyroxine  25 mcg Oral Early Morning Cleve Zamora MD      LORazepam  0 5 mg Oral HS Ivanna Trevino MD      oxybutynin  5 mg Oral Daily Ivanna Trevino MD      pantoprazole  20 mg Oral BID AC Ivanna Trevino MD      polyethylene glycol  17 g Oral Daily Ivanna Trevino MD      pravastatin  40 mg Oral Daily With Debbie Deluna MD      senna  2 tablet Oral HS Ivanna Trevino MD      traZODone  100 mg Oral HS Ivanna Trevino MD      valproic acid  500 mg Oral BID Ivanna Trevino MD          Today, Patient Was Seen By: Jose Holliday MD    ** Please Note: This note has been constructed using a voice recognition system   **

## 2021-08-23 NOTE — ASSESSMENT & PLAN NOTE
· History of recurrent UTIs  On prophylactic nitrofurantoin daily per urology  · Past urine cultures speciated to pansensitive klebsiella (9/1/2020) and enterobacter resistant to nitrofurantoin but susceptible to ceftriaxone (7/26/2020)  · Unlikely MDRO given past cultures  · Did not meet Sepsis criteria on admission: , RR 20, WBC 8 59 w/o bandemia, afebrile   · Lactate elevated at 3 8 on admission  · Patient received ceftriaxone IV x3 days and last dose was on 08/21      Plan:   - lactic acid trending down  - urine and blood culture showed no growth to date  - afebrile since admission   - No leukocytosis

## 2021-08-23 NOTE — ASSESSMENT & PLAN NOTE
Lab Results   Component Value Date    HGBA1C 6 8 (H) 08/19/2021       Recent Labs     08/22/21  1623 08/22/21  2113 08/23/21  0725 08/23/21  1107   POCGLU 260* 235* 248* 238*       Blood Sugar Average: Last 72 hrs:  (P) 191 [id number]     Non-insulin dependent  Home meds metformin 1000mg daily, linagliptin 5mg daily (started on 6/25/21 for recurrent hyperglycemia)  Plan:  - hypoglycemic protocol   - diabetic diet level 2   - discontinue D5 per Nephrology team recommendations and patient started on  mL bolus x1

## 2021-08-23 NOTE — ASSESSMENT & PLAN NOTE
Blood Pressure: 108/68    · Patient was hypotensive this morning with blood pressure of 71/50  Plan:  · Discontinue D5 per Nephrology team recommendation and patient given  mL bolus x1  · Monitor vitals Q4h

## 2021-08-23 NOTE — ASSESSMENT & PLAN NOTE
Lab Results   Component Value Date    CHOLESTEROL 157 04/08/2021    TRIG 132 04/08/2021    HDL 47 04/08/2021    LDLCALC 84 04/08/2021       Continue statin

## 2021-08-24 ENCOUNTER — TELEPHONE (OUTPATIENT)
Dept: LAB | Facility: HOSPITAL | Age: 61
End: 2021-08-24

## 2021-08-24 PROBLEM — E87.6 HYPOKALEMIA: Status: RESOLVED | Noted: 2020-07-31 | Resolved: 2021-08-24

## 2021-08-24 PROBLEM — N39.0 UTI (URINARY TRACT INFECTION): Status: RESOLVED | Noted: 2017-03-27 | Resolved: 2021-08-24

## 2021-08-24 LAB
ANION GAP SERPL CALCULATED.3IONS-SCNC: 5 MMOL/L (ref 4–13)
ANION GAP SERPL CALCULATED.3IONS-SCNC: 6 MMOL/L (ref 4–13)
BACTERIA BLD CULT: NORMAL
BACTERIA BLD CULT: NORMAL
BUN SERPL-MCNC: 20 MG/DL (ref 5–25)
BUN SERPL-MCNC: 20 MG/DL (ref 5–25)
CALCIUM SERPL-MCNC: 8.9 MG/DL (ref 8.3–10.1)
CALCIUM SERPL-MCNC: 9.1 MG/DL (ref 8.3–10.1)
CHLORIDE SERPL-SCNC: 108 MMOL/L (ref 100–108)
CHLORIDE SERPL-SCNC: 109 MMOL/L (ref 100–108)
CO2 SERPL-SCNC: 32 MMOL/L (ref 21–32)
CO2 SERPL-SCNC: 33 MMOL/L (ref 21–32)
CREAT SERPL-MCNC: 0.88 MG/DL (ref 0.6–1.3)
CREAT SERPL-MCNC: 0.92 MG/DL (ref 0.6–1.3)
ERYTHROCYTE [DISTWIDTH] IN BLOOD BY AUTOMATED COUNT: 13 % (ref 11.6–15.1)
GFR SERPL CREATININE-BSD FRML MDRD: 68 ML/MIN/1.73SQ M
GFR SERPL CREATININE-BSD FRML MDRD: 72 ML/MIN/1.73SQ M
GLUCOSE SERPL-MCNC: 157 MG/DL (ref 65–140)
GLUCOSE SERPL-MCNC: 160 MG/DL (ref 65–140)
GLUCOSE SERPL-MCNC: 200 MG/DL (ref 65–140)
GLUCOSE SERPL-MCNC: 201 MG/DL (ref 65–140)
GLUCOSE SERPL-MCNC: 224 MG/DL (ref 65–140)
GLUCOSE SERPL-MCNC: 251 MG/DL (ref 65–140)
HCT VFR BLD AUTO: 31.8 % (ref 34.8–46.1)
HGB BLD-MCNC: 10.2 G/DL (ref 11.5–15.4)
MCH RBC QN AUTO: 34.5 PG (ref 26.8–34.3)
MCHC RBC AUTO-ENTMCNC: 32.1 G/DL (ref 31.4–37.4)
MCV RBC AUTO: 107 FL (ref 82–98)
PLATELET # BLD AUTO: 130 THOUSANDS/UL (ref 149–390)
PMV BLD AUTO: 12.2 FL (ref 8.9–12.7)
POTASSIUM SERPL-SCNC: 3.9 MMOL/L (ref 3.5–5.3)
POTASSIUM SERPL-SCNC: 4 MMOL/L (ref 3.5–5.3)
RBC # BLD AUTO: 2.96 MILLION/UL (ref 3.81–5.12)
SODIUM SERPL-SCNC: 146 MMOL/L (ref 136–145)
SODIUM SERPL-SCNC: 147 MMOL/L (ref 136–145)
WBC # BLD AUTO: 6.21 THOUSAND/UL (ref 4.31–10.16)

## 2021-08-24 PROCEDURE — 82948 REAGENT STRIP/BLOOD GLUCOSE: CPT

## 2021-08-24 PROCEDURE — 85027 COMPLETE CBC AUTOMATED: CPT

## 2021-08-24 PROCEDURE — 99232 SBSQ HOSP IP/OBS MODERATE 35: CPT | Performed by: INTERNAL MEDICINE

## 2021-08-24 PROCEDURE — 80048 BASIC METABOLIC PNL TOTAL CA: CPT

## 2021-08-24 PROCEDURE — 80048 BASIC METABOLIC PNL TOTAL CA: CPT | Performed by: PHYSICIAN ASSISTANT

## 2021-08-24 RX ORDER — SODIUM CHLORIDE 450 MG/100ML
50 INJECTION, SOLUTION INTRAVENOUS CONTINUOUS
Status: DISPENSED | OUTPATIENT
Start: 2021-08-24 | End: 2021-08-24

## 2021-08-24 RX ADMIN — Medication 1 TABLET: at 12:33

## 2021-08-24 RX ADMIN — OXYBUTYNIN 5 MG: 5 TABLET, FILM COATED, EXTENDED RELEASE ORAL at 09:02

## 2021-08-24 RX ADMIN — FERROUS SULFATE TAB 325 MG (65 MG ELEMENTAL FE) 325 MG: 325 (65 FE) TAB at 09:09

## 2021-08-24 RX ADMIN — BACLOFEN 10 MG: 10 TABLET ORAL at 09:02

## 2021-08-24 RX ADMIN — INSULIN LISPRO 2 UNITS: 100 INJECTION, SOLUTION INTRAVENOUS; SUBCUTANEOUS at 11:47

## 2021-08-24 RX ADMIN — ARIPIPRAZOLE 30 MG: 10 TABLET ORAL at 19:12

## 2021-08-24 RX ADMIN — Medication 1000 UNITS: at 09:02

## 2021-08-24 RX ADMIN — PANTOPRAZOLE SODIUM 20 MG: 40 TABLET, DELAYED RELEASE ORAL at 06:31

## 2021-08-24 RX ADMIN — CITALOPRAM HYDROBROMIDE 40 MG: 20 TABLET ORAL at 09:02

## 2021-08-24 RX ADMIN — PRAVASTATIN SODIUM 40 MG: 40 TABLET ORAL at 15:54

## 2021-08-24 RX ADMIN — BACLOFEN 10 MG: 10 TABLET ORAL at 15:54

## 2021-08-24 RX ADMIN — VALPROIC ACID 500 MG: 250 SOLUTION ORAL at 19:11

## 2021-08-24 RX ADMIN — INSULIN LISPRO 1 UNITS: 100 INJECTION, SOLUTION INTRAVENOUS; SUBCUTANEOUS at 17:45

## 2021-08-24 RX ADMIN — INSULIN LISPRO 1 UNITS: 100 INJECTION, SOLUTION INTRAVENOUS; SUBCUTANEOUS at 09:09

## 2021-08-24 RX ADMIN — Medication 1 TABLET: at 19:11

## 2021-08-24 RX ADMIN — VALPROIC ACID 500 MG: 250 SOLUTION ORAL at 09:01

## 2021-08-24 RX ADMIN — LEVOTHYROXINE SODIUM 25 MCG: 25 TABLET ORAL at 06:31

## 2021-08-24 RX ADMIN — PANTOPRAZOLE SODIUM 20 MG: 40 TABLET, DELAYED RELEASE ORAL at 15:54

## 2021-08-24 RX ADMIN — SODIUM CHLORIDE 50 ML/HR: 0.45 INJECTION, SOLUTION INTRAVENOUS at 09:06

## 2021-08-24 RX ADMIN — ENOXAPARIN SODIUM 40 MG: 40 INJECTION SUBCUTANEOUS at 09:02

## 2021-08-24 RX ADMIN — FERROUS SULFATE TAB 325 MG (65 MG ELEMENTAL FE) 325 MG: 325 (65 FE) TAB at 15:53

## 2021-08-24 RX ADMIN — BACLOFEN 10 MG: 10 TABLET ORAL at 21:16

## 2021-08-24 NOTE — ASSESSMENT & PLAN NOTE
Recent Labs     08/22/21  2244 08/23/21  0637 08/24/21  0612   K 4 1 4 1 4 0     Potassium 3 2 on admission 4 1 this AM     Plan:   Resolved

## 2021-08-24 NOTE — UTILIZATION REVIEW
Continued Stay Review    Date: 8/24/21               Current Patient Class:  IP  Current Level of Care:  MS    HPI:61 y o  female with hx cerebral palsy, nonverbal as baseline initially admitted on 8/19/21 as OBS converted to IP 8/20 with UTI  CT A/P shows constipation, Pt  with hypercalcemia, hypokalemia, hypernatremia   IV abx d/c'ed 8/21 with culture growing mixed contaminate , pt receiving IVF, serial BMP's    8/22- pt drowsy, unable to assist with self feeding  Pt continues on IVF with BMP monitoring  Nephrology consulted -start D5W at 100 cc/hour and that should correct her sodium concentration to normal by tomorrow morning  Sodium 147 today  8/23  Pt hypotensive with SBP 70-80's, UOP 1 1 L  Plan- hold D5W, give NSS fluid bolus 500 ml x1 per nephrology , monitor vitals  Pt resting in bed, nonverbal, does not appear to be in any distress  Assessment/Plan: 8/24  Potassium normal, sodium 147 today-will give half-normal saline to allow for volume and free water x 10 hrs, repeat BMP 8/25      Vital Signs:   Date/Time  Temp  Pulse  Resp  BP  MAP (mmHg)  SpO2  O2 Device  Patient Position - Orthostatic VS   08/24/21 1515  98 4 °F (36 9 °C)  60  18  90/57  --  95 %  None (Room air)  Lying   08/24/21 07:24:54  --  66  --  --  --  95 %  --  --   08/24/21 07:24:53  98 1 °F (36 7 °C)  --  16  115/62  80  --  --  Lying   08/23/21 21:30:33  98 1 °F (36 7 °C)  59  18  118/63  81  95 %  --  --   08/23/21 15:10:39  98 8 °F (37 1 °C)  57  18  108/68  81  97 %  None (Room air)  Lying   08/23/21 12:33:37  --  69  --  87/57Abnormal   67  97 %  --  --   08/23/21 06:54:48  97 1 °F (36 2 °C)Abnormal   50Abnormal   18  71/50Abnormal   57  96 %  --  --   08/22/21 21:20:37  97 5 °F (36 4 °C)  69  16  87/53Abnormal   64  94 %  --  --   08/22/21 15:19:19  98 2 °F (36 8 °C)  --  16  81/52Abnormal   62  --  --  --   08/22/21 07:13:02  97 °F (36 1 °C)Abnormal   54Abnormal   15  112/64  80  96 %  --  --         Pertinent Labs/Diagnostic Results: Results from last 7 days   Lab Units 08/23/21  1603 08/19/21  1147   SARS-COV-2  Negative Negative     Results from last 7 days   Lab Units 08/24/21  0612 08/22/21  2244 08/22/21  0541 08/21/21  0508 08/20/21  0440 08/19/21  1029   WBC Thousand/uL 6 21  --  5 47 5 34 7 74 8 59   HEMOGLOBIN g/dL 10 2* 10 3* 10 4* 10 3* 10 4* 11 8   HEMATOCRIT % 31 8* 31 8* 32 7* 31 4* 32 2* 36 8   PLATELETS Thousands/uL 130*  --  127* 129* 131* 143*   NEUTROS ABS Thousands/µL  --   --   --   --  3 53 5 31         Results from last 7 days   Lab Units 08/24/21  1436 08/24/21  0612 08/23/21  0637 08/22/21  2244 08/22/21  0541   SODIUM mmol/L 146* 147* 144 144 147*   POTASSIUM mmol/L 3 9 4 0 4 1 4 1 3 6   CHLORIDE mmol/L 108 109* 109* 109* 112*   CO2 mmol/L 33* 32 30 29 30   ANION GAP mmol/L 5 6 5 6 5   BUN mg/dL 20 20 24 24 26*   CREATININE mg/dL 0 92 0 88 0 90 0 99 0 97   EGFR ml/min/1 73sq m 68 72 70 63 64   CALCIUM mg/dL 9 1 8 9 9 0 8 5 8 1*     Results from last 7 days   Lab Units 08/19/21  1029   AST U/L 8   ALT U/L 11*   ALK PHOS U/L 57   TOTAL PROTEIN g/dL 6 9   ALBUMIN g/dL 2 9*   TOTAL BILIRUBIN mg/dL 0 17*     Results from last 7 days   Lab Units 08/24/21  1732 08/24/21  1043 08/24/21  0722 08/23/21  1709 08/23/21  1107 08/23/21  0725 08/22/21  2113 08/22/21  1623 08/22/21  1117 08/22/21  0745 08/21/21  2124 08/21/21  1457   POC GLUCOSE mg/dl 200* 251* 160* 111 238* 248* 235* 260* 159* 186* 253* 218*     Results from last 7 days   Lab Units 08/24/21  1436 08/24/21  0612 08/23/21  0637 08/22/21  2244 08/22/21  0541 08/21/21  0508 08/20/21  1454 08/20/21  0440 08/19/21  1029   GLUCOSE RANDOM mg/dL 201* 157* 274* 316* 190* 162* 106 95 185*         Results from last 7 days   Lab Units 08/19/21  1029   HEMOGLOBIN A1C % 6 8*   EAG mg/dl 148         Results from last 7 days   Lab Units 08/19/21  1029   TROPONIN I ng/mL <0 02             Results from last 7 days   Lab Units 08/19/21  1029   TSH 3RD GENERATON uIU/mL 2 184 Results from last 7 days   Lab Units 08/22/21  2244 08/22/21  1930 08/22/21  1638 08/19/21  1808 08/19/21  1541   LACTIC ACID mmol/L 1 8 2 8* 2 1* 2 6* 3 8*               Results from last 7 days   Lab Units 08/19/21  1029   LIPASE u/L 173             Results from last 7 days   Lab Units 08/19/21  1356   CLARITY UA  Turbid   COLOR UA  Light Yellow   SPEC GRAV UA  1 010   PH UA  6 0   GLUCOSE UA mg/dl 250 (1/4%)*   KETONES UA mg/dl Negative   BLOOD UA  Trace-Intact*   PROTEIN UA mg/dl Negative   NITRITE UA  Negative   BILIRUBIN UA  Negative   UROBILINOGEN UA E U /dl 0 2   LEUKOCYTES UA  Moderate*   WBC UA /hpf Innumerable*   RBC UA /hpf 4-10*   BACTERIA UA /hpf Innumerable*   EPITHELIAL CELLS WET PREP /hpf Moderate*     Results from last 7 days   Lab Units 08/23/21  1603   INFLUENZA A PCR  Negative   INFLUENZA B PCR  Negative   RSV PCR  Negative           Results from last 7 days   Lab Units 08/19/21  1550 08/19/21  1540 08/19/21  1356   BLOOD CULTURE  No Growth After 4 Days  No Growth After 4 Days  --    URINE CULTURE   --   --  <10,000 cfu/ml            Medications:   Scheduled Medications:  ARIPiprazole, 30 mg, Oral, HS  baclofen, 10 mg, Oral, TID  calcium carbonate-vitamin D, 1 tablet, Oral, BID With Meals  cholecalciferol, 1,000 Units, Oral, Daily  citalopram, 40 mg, Oral, QAM  enoxaparin, 40 mg, Subcutaneous, Q24H [place]  ferrous sulfate, 325 mg, Oral, BID With Meals  insulin lispro, 1-5 Units, Subcutaneous, TID AC  levothyroxine, 25 mcg, Oral, Early Morning  LORazepam, 0 5 mg, Oral, HS  oxybutynin, 5 mg, Oral, Daily  pantoprazole, 20 mg, Oral, BID AC  polyethylene glycol, 17 g, Oral, Daily  pravastatin, 40 mg, Oral, Daily With Dinner  senna, 2 tablet, Oral, HS  traZODone, 100 mg, Oral, HS  valproic acid, 500 mg, Oral, BID  sodium chloride 0 9 % bolus 500 mL   Dose: 500 mL  Freq:  Once Route: IV  Last Dose: Stopped (08/23/21 1333)    Continuous IV Infusions:  sodium chloride infusion 0 45 %   Rate: 75 mL/hr Dose: 75 mL/hr  Freq: Continuous Route: IV  Last Dose: Stopped (08/22/21 1001)  Start: 08/21/21 1545 End: 08/22/21 0927  :dextrose 5 % infusion   Rate: 100 mL/hr Dose: 100 mL/hr  Freq: Continuous Route: IV  Indications of Use: IV Hydration  Last Dose: Stopped (08/23/21 1140)  Start: 08/22/21 1215 End: 08/23/21 0920    sodium chloride, 50 mL/hr, Intravenous, Continuous     PRN Meds:  acetaminophen, 650 mg, Oral, Q6H PRN  bisacodyl, 10 mg, Rectal, Daily PRN        Discharge Plan: D    Network Utilization Review Department  ATTENTION: Please call with any questions or concerns to [phone] and carefully listen to the prompts so that you are directed to the right person  All voicemails are confidential   [facility] all requests for admission clinical reviews, approved or denied determinations and any other requests to dedicated fax number below belonging to the campus where the patient is receiving treatment   List of dedicated fax numbers for the Facilities:  [address] DENIALS (Administrative/Medical Necessity) [phone]   [address] (Maternity/NICU/Pediatrics) [phone] [address] Sharla [place] [address] [id number] [place] [address] [address] Morales [address] [facility] [place] [address] [phone]

## 2021-08-24 NOTE — CASE MANAGEMENT
Pts discharge postponed until 8/25  Caregiver Kandace Turner has left discharge outline to be signed by the discharging physician  This is required by the state of [place]  Form is in the top drawer of the center file cabinet at [address] [place] [address] on 4W, identified by room number and Pts name  Kandace Turner states she has left everything she needs to bring Pt safely home, in Pts room, as Pt is tentatively scheduled to discharge on 8/25

## 2021-08-24 NOTE — ASSESSMENT & PLAN NOTE
Blood Pressure: 115/62    · Pt has repeated bouts of hypotension with SBPs below 100  Resolve with hydration

## 2021-08-24 NOTE — ASSESSMENT & PLAN NOTE
· History of recurrent UTIs  On prophylactic nitrofurantoin daily per urology  · Past urine cultures speciated to pansensitive klebsiella (9/1/2020) and enterobacter resistant to nitrofurantoin but susceptible to ceftriaxone (7/26/2020)  · Did not meet Sepsis criteria on admission: , RR 20, WBC 8 59 w/o bandemia, afebrile   · Lactate elevated at 3 8 on admission  · Patient received ceftriaxone IV x3 days and last dose was on 08/21      Plan:   - lactic acid trending down  - urine and blood culture showed no growth to date  - afebrile since admission   - No leukocytosis

## 2021-08-24 NOTE — CASE MANAGEMENT
CM follow up  CM placed call to Darshan Alex with [facility] to report that Pt has been cleared for discharge  As per CM handoff, Darshan Alex will assist w/ transport  No new scripts have been written  Darshan Alex has questions regarding Pts sodium, and expressed concerns for how she will address high sodium levels at home  Adding that Pt was not cleared for discharge yesterday (8/23) because of high sodium levels  Darshan Alex reports she will come hospital with everything she needs to bring Pt back home, with intention of understanding Pts medical discharge needs, via consult with [facility]

## 2021-08-24 NOTE — PROGRESS NOTES
[address] 63 y o  female MRN: [id number]  Unit/Bed#: W -01 Encounter: [id number]  Reason for Consult: hypernatremia    ASSESSMENT and PLAN:    68-year-old female with a past medical history of cerebral palsy, diabetes, dysphagia, hypothyroid, bipolar disorder, hyperlipidemia, GERD, who initially presents with decreased activity and concern for urinary tract infection  There is initial concern for sepsis  Was initially started on intravenous antibiotics  There is also concern for constipation and bowel regimen was added     1) hypernatremia     -likely due to lack of free water and improved with D5W  -August 23rd-D5W and give saline due to need for resuscitation due to hypotension  -August 24th-sodium level rising 147  Will give half-normal saline to allow for volume and free water      Plan  -give half-normal saline for 10 hours today  -repeat BMP in a m      2) urinary tract infection-appears less likely based on urine culture     -history of recurrent UTI  -urinalysis with innumerable WBC, 4-10 RBC  -follows with Urology team and is on prophylactic nitrofurantoin as outpatient  -urine culture with mixed contaminant and antibiotics were discontinued per the primary team on August 21st urine culture     3) electrolytes     -initially with mild hypercalcemia-initial home calcium and vitamin-D regimen were held  -improved     -hypernatremia-required hypotonic fluids  See above  Improved     -hypokalemia-required repletion initially  Stable     4) acid/base-stable     5) renal function-creatinine fluctuates between 0 9-1 21 mg/dL  remains at Baseline      6) hypotension-improved with volume expansion       SUBJECTIVE / 24H INTERVAL HISTORY:    Blood pressure is improving 564-236 systolic  Urine output 1 3 L with 1 time not recorded  Patient awake    Nonverbal     OBJECTIVE:  Current Weight: Weight - Scale: 42 6 kg (93 lb 14 7 oz)  Vitals:    08/23/21 1510 08/23/21 2130 08/24/21 0724 08/24/21 0724   BP: 108/68 118/63 115/62    BP Location: Left arm  Left arm    Pulse: 57 59  66   Resp: 18 18 16    Temp: 98 8 °F (37 1 °C) 98 1 °F (36 7 °C) 98 1 °F (36 7 °C)    TempSrc: Oral  Oral    SpO2: 97% 95%  95%   Weight:       Height:           Intake/Output Summary (Last 24 hours) at 8/24/2021 0820  Last data filed at 8/24/2021 3813  Gross per 24 hour   Intake 600 ml   Output 1375 ml   Net -775 ml     General: NAD  Skin: no rash  Eyes: anicteric sclera  Neck: supple  Chest: CTA b/l, no ronchii, no wheeze, no rubs, no rales  CVS: s1s2, no murmur, no gallop, no rub  Abdomen: soft, nontender, nl sounds  Extremities:  Trace edema LE b/l  : no boateng  Neuro:  Cannot assess  Psych:  Cannot assess    Medications:    Current Facility-Administered Medications:     acetaminophen (TYLENOL) oral suspension 650 mg, 650 mg, Oral, Q6H PRN, Cristhian Pate MD    ARIPiprazole (ABILIFY) tablet 30 mg, 30 mg, Oral, HS, Cristhian Pate MD, 30 mg at 08/22/21 2027    baclofen tablet 10 mg, 10 mg, Oral, TID, Cristhian Pate MD, 10 mg at 08/23/21 0831    bisacodyl (DULCOLAX) rectal suppository 10 mg, 10 mg, Rectal, Daily PRN, Cristhian Pate MD    calcium carbonate-vitamin D (OSCAL-D) 500 mg-200 units per tablet 1 tablet, 1 tablet, Oral, BID With Meals, Kassandra Brooks MD, 1 tablet at 08/23/21 1215    cholecalciferol (VITAMIN D3) tablet 1,000 Units, 1,000 Units, Oral, Daily, Kassandra Brooks MD, 1,000 Units at 08/23/21 0831    citalopram (CeleXA) tablet 40 mg, 40 mg, Oral, QAM, Cristhian Pate MD, 40 mg at 08/23/21 0831    enoxaparin (LOVENOX) subcutaneous injection 40 mg, 40 mg, Subcutaneous, Q24H [address], Cristhian Pate MD, 40 mg at 08/23/21 1291    ferrous sulfate tablet 325 mg, 325 mg, Oral, BID With Meals, Cristhian Pate MD, 325 mg at 08/23/21 0831    insulin lispro (HumaLOG) 100 units/mL subcutaneous injection 1-5 Units, 1-5 Units, Subcutaneous, TID AC, 2 Units at 08/23/21 1331 **AND** Fingerstick Glucose (POCT), , , TID AC, Ryan Parker MD    levothyroxine tablet 25 mcg, 25 mcg, Oral, Early Morning, Macie Varela MD, 25 mcg at 08/24/21 0631    LORazepam (ATIVAN) tablet 0 5 mg, 0 5 mg, Oral, HS, Macie Varela MD, 0 5 mg at 08/22/21 2151    oxybutynin (DITROPAN-XL) 24 hr tablet 5 mg, 5 mg, Oral, Daily, Macie Varela MD, 5 mg at 08/23/21 0831    pantoprazole (PROTONIX) EC tablet 20 mg, 20 mg, Oral, BID AC, Macie Varela MD, 20 mg at 08/24/21 0631    polyethylene glycol (MIRALAX) packet 17 g, 17 g, Oral, Daily, Macie Varela MD, 17 g at 08/21/21 0841    pravastatin (PRAVACHOL) tablet 40 mg, 40 mg, Oral, Daily With Darcie Foy MD, 40 mg at 08/22/21 1741    senna (SENOKOT) tablet 17 2 mg, 2 tablet, Oral, HS, Macie Varela MD, 17 2 mg at 08/22/21 2150    traZODone (DESYREL) tablet 100 mg, 100 mg, Oral, HS, Macie Varela MD, 100 mg at 08/22/21 2026    valproic acid (DEPAKENE) oral soln 500 mg, 500 mg, Oral, BID, Macie Varela MD, 500 mg at 08/23/21 6468    Laboratory Results:  Results from last 7 days   Lab Units 08/24/21  0612 08/23/21  0637 08/22/21  2244 08/22/21  0541 08/21/21  0508 08/20/21  1454 08/20/21  0440 08/19/21  1029   WBC Thousand/uL 6 21  --   --  5 47 5 34  --  7 74 8 59   HEMOGLOBIN g/dL 10 2*  --  10 3* 10 4* 10 3*  --  10 4* 11 8   HEMATOCRIT % 31 8*  --  31 8* 32 7* 31 4*  --  32 2* 36 8   PLATELETS Thousands/uL 130*  --   --  127* 129*  --  131* 143*   POTASSIUM mmol/L 4 0 4 1 4 1 3 6 3 9 3 7 4 0 3 2*   CHLORIDE mmol/L 109* 109* 109* 112* 112* 110* 110* 103   CO2 mmol/L 32 30 29 30 30 35* 31 32   BUN mg/dL 20 24 24 26* 18 11 12 11   CREATININE mg/dL 0 88 0 90 0 99 0 97 0 93 0 89 0 90 1 05   CALCIUM mg/dL 8 9 9 0 8 5 8 1* 8 2* 8 2* 8 5 9 8

## 2021-08-24 NOTE — ASSESSMENT & PLAN NOTE
Recent Labs     08/22/21  2244 08/23/21  0637 08/24/21  0612   SODIUM 144 144 147*      · Given D5 normal saline for volume replacement  On 1/2 NS per nephrology  · F/u BMP tomorrow morning to assess for normalization

## 2021-08-24 NOTE — ASSESSMENT & PLAN NOTE
Lab Results   Component Value Date    HGBA1C 6 8 (H) 08/19/2021       Recent Labs     08/23/21  0725 08/23/21  1107 08/23/21  1709 08/24/21  0722   POCGLU 248* 238* 111 160*       Blood Sugar Average: Last 72 hrs:  (P) 056 [id number]     Non-insulin dependent  Home meds metformin 1000mg daily, linagliptin 5mg daily (started on 6/25/21 for recurrent hyperglycemia)       Plan:  - hypoglycemic protocol   - diabetic diet level 2

## 2021-08-25 LAB
ANION GAP SERPL CALCULATED.3IONS-SCNC: 8 MMOL/L (ref 4–13)
BUN SERPL-MCNC: 22 MG/DL (ref 5–25)
CALCIUM SERPL-MCNC: 9.9 MG/DL (ref 8.3–10.1)
CHLORIDE SERPL-SCNC: 108 MMOL/L (ref 100–108)
CO2 SERPL-SCNC: 33 MMOL/L (ref 21–32)
CREAT SERPL-MCNC: 0.98 MG/DL (ref 0.6–1.3)
GFR SERPL CREATININE-BSD FRML MDRD: 63 ML/MIN/1.73SQ M
GLUCOSE SERPL-MCNC: 223 MG/DL (ref 65–140)
GLUCOSE SERPL-MCNC: 235 MG/DL (ref 65–140)
GLUCOSE SERPL-MCNC: 277 MG/DL (ref 65–140)
GLUCOSE SERPL-MCNC: 304 MG/DL (ref 65–140)
POTASSIUM SERPL-SCNC: 4.7 MMOL/L (ref 3.5–5.3)
SODIUM SERPL-SCNC: 149 MMOL/L (ref 136–145)

## 2021-08-25 PROCEDURE — 82948 REAGENT STRIP/BLOOD GLUCOSE: CPT

## 2021-08-25 PROCEDURE — 80048 BASIC METABOLIC PNL TOTAL CA: CPT | Performed by: INTERNAL MEDICINE

## 2021-08-25 PROCEDURE — 99232 SBSQ HOSP IP/OBS MODERATE 35: CPT | Performed by: INTERNAL MEDICINE

## 2021-08-25 RX ORDER — DEXTROSE MONOHYDRATE 50 MG/ML
75 INJECTION, SOLUTION INTRAVENOUS CONTINUOUS
Status: DISCONTINUED | OUTPATIENT
Start: 2021-08-25 | End: 2021-08-25

## 2021-08-25 RX ORDER — DEXTROSE MONOHYDRATE 50 MG/ML
75 INJECTION, SOLUTION INTRAVENOUS CONTINUOUS
Status: DISPENSED | OUTPATIENT
Start: 2021-08-25 | End: 2021-08-26

## 2021-08-25 RX ADMIN — ARIPIPRAZOLE 30 MG: 10 TABLET ORAL at 22:04

## 2021-08-25 RX ADMIN — FERROUS SULFATE TAB 325 MG (65 MG ELEMENTAL FE) 325 MG: 325 (65 FE) TAB at 18:15

## 2021-08-25 RX ADMIN — TRAZODONE HYDROCHLORIDE 100 MG: 100 TABLET ORAL at 22:07

## 2021-08-25 RX ADMIN — Medication 1 TABLET: at 11:38

## 2021-08-25 RX ADMIN — Medication 1 TABLET: at 18:15

## 2021-08-25 RX ADMIN — PANTOPRAZOLE SODIUM 20 MG: 40 TABLET, DELAYED RELEASE ORAL at 09:26

## 2021-08-25 RX ADMIN — INSULIN LISPRO 2 UNITS: 100 INJECTION, SOLUTION INTRAVENOUS; SUBCUTANEOUS at 09:27

## 2021-08-25 RX ADMIN — INSULIN LISPRO 3 UNITS: 100 INJECTION, SOLUTION INTRAVENOUS; SUBCUTANEOUS at 11:39

## 2021-08-25 RX ADMIN — BACLOFEN 10 MG: 10 TABLET ORAL at 22:05

## 2021-08-25 RX ADMIN — PANTOPRAZOLE SODIUM 20 MG: 40 TABLET, DELAYED RELEASE ORAL at 18:15

## 2021-08-25 RX ADMIN — BACLOFEN 10 MG: 10 TABLET ORAL at 09:25

## 2021-08-25 RX ADMIN — OXYBUTYNIN 5 MG: 5 TABLET, FILM COATED, EXTENDED RELEASE ORAL at 09:26

## 2021-08-25 RX ADMIN — LEVOTHYROXINE SODIUM 25 MCG: 25 TABLET ORAL at 05:06

## 2021-08-25 RX ADMIN — VALPROIC ACID 500 MG: 250 SOLUTION ORAL at 09:25

## 2021-08-25 RX ADMIN — PRAVASTATIN SODIUM 40 MG: 40 TABLET ORAL at 18:15

## 2021-08-25 RX ADMIN — ENOXAPARIN SODIUM 40 MG: 40 INJECTION SUBCUTANEOUS at 09:26

## 2021-08-25 RX ADMIN — FERROUS SULFATE TAB 325 MG (65 MG ELEMENTAL FE) 325 MG: 325 (65 FE) TAB at 09:26

## 2021-08-25 RX ADMIN — DEXTROSE 100 ML/HR: 5 SOLUTION INTRAVENOUS at 08:50

## 2021-08-25 RX ADMIN — Medication 1000 UNITS: at 09:25

## 2021-08-25 RX ADMIN — STANDARDIZED SENNA CONCENTRATE 17.2 MG: 8.6 TABLET ORAL at 22:05

## 2021-08-25 RX ADMIN — BACLOFEN 10 MG: 10 TABLET ORAL at 18:15

## 2021-08-25 RX ADMIN — INSULIN LISPRO 3 UNITS: 100 INJECTION, SOLUTION INTRAVENOUS; SUBCUTANEOUS at 16:00

## 2021-08-25 RX ADMIN — VALPROIC ACID 500 MG: 250 SOLUTION ORAL at 18:15

## 2021-08-25 RX ADMIN — CITALOPRAM HYDROBROMIDE 40 MG: 20 TABLET ORAL at 09:25

## 2021-08-25 NOTE — PROGRESS NOTES
[address] 1960, 63 y o  female MRN: [id number]  Unit/Bed#: W -01 Encounter: [id number]  Primary Care Provider: Dena Roper DO   Date and time admitted to hospital: 8/19/2021 10:07 AM    * Hypernatremia  Assessment & Plan  Recent Labs     08/24/21  0612 08/24/21  1436 08/25/21  0524   SODIUM 147* 146* 149*      · Given 1/2 NS per nephrology, hypernatremic this AM with Na of 149  · Started on D5, nephrology agreed  · Trend BMP for sodium reduction    Type 2 diabetes mellitus, without long-term current use of insulin [facility])  Assessment & Plan  Lab Results   Component Value Date    HGBA1C 6 8 (H) 08/19/2021       Recent Labs     08/24/21  1732 08/24/21  2117 08/25/21  0721 08/25/21  1101   POCGLU 200* 224* 223* 304*       Blood Sugar Average: Last 72 hrs:  (P) 215 [id number]     Non-insulin dependent  Home meds metformin 1000mg daily, linagliptin 5mg daily (started on 6/25/21 for recurrent hyperglycemia)  Plan:  - hypoglycemic protocol   - Monitor closely for glycemic control in the setting of D5 infusion  - diabetic diet level 2       Hypothyroidism  Assessment & Plan  TSH WNL  Continue home levothyroxine 25mcg daily     Hyperlipidemia  Assessment & Plan  Lab Results   Component Value Date    CHOLESTEROL 157 04/08/2021    TRIG 132 04/08/2021    HDL 47 04/08/2021    [address] 86 04/08/2021       Continue statin     Dysphagia  Assessment & Plan  · Moderate oral/pharyngeal dysphagia  Follows with GI    · EGD 10/26/20 with moderate gastritis and no esophageal stricture/narrowing or web seen  Pathology negative for h pylori, intestinal metaplasia, and EOE  · Honey thickened liquid diet per prior speech evaluation       Plan:   - diet: dysphagia 1 pureed, honey thick liquid; diabetic diet carb controlled level 2   - medications crushed   - patient cleared for discharge per speech eval    Hypotension  Assessment & Plan  Blood Pressure: 113/73    · Pt has repeated bouts of hypotension with SBPs below 100  Resolve with hydration  Constipation  Assessment & Plan  Chronic: Continue home regimen         Bipolar disorder (HCC)  Assessment & Plan  Continue home medication regimen:  - abilify 30mg at bedtime  - ativan 0 5mg at bedtime   - citalopram 40mg daily   - valproic acid 500mg BID   - trazodone 100mg at bedtime     Cerebral palsy [facility])  Assessment & Plan  Patient largely non-verbal at baseline     Gastroesophageal reflux disease  Assessment & Plan  Continue home PPI         VTE Pharmacologic Prophylaxis: VTE Score: 4 Moderate Risk (Score 3-4) - Pharmacological DVT Prophylaxis Contraindicated  Sequential Compression Devices Ordered  Patient Centered Rounds: I performed bedside rounds with nursing staff today  Discussions with Specialists or Other Care Team Provider: Nephrology     Education and Discussions with Family / Patient: Updated  ([address]) via phone  Current Length of Stay: 5 day(s)  Current Patient Status: Inpatient   Discharge Plan: Anticipate discharge in 24-48 hrs to prior assisted or independent living facility  Code Status: Level 1 - Full Code    Subjective:   No events overnight  Patient at baseline mental status  Non-verbal  Assessed patient sodium level and will continue to treat with IV fluids  Following nephrology recommendations  Will monitor patient glucose closely while receiving D5  Objective:     Vitals:   Temp (24hrs), Av 2 °F (36 8 °C), Min:97 5 °F (36 4 °C), Max:98 7 °F (37 1 °C)    Temp:  [97 5 °F (36 4 °C)-98 7 °F (37 1 °C)] 98 7 °F (37 1 °C)  HR:  [53-65] 65  Resp:  [16-18] 16  BP: ()/(57-73) 113/73  SpO2:  [90 %-95 %] 94 %  Body mass index is 19 63 kg/m²  Input and Output Summary (last 24 hours):      Intake/Output Summary (Last 24 hours) at 2021 1333  Last data filed at 2021 0957  Gross per 24 hour   Intake 1328 33 ml   Output 2160 ml   Net -831 67 ml Physical Exam:   Physical Exam  Vitals and nursing note reviewed  Constitutional:       General: She is not in acute distress  Appearance: She is well-developed  HENT:      Head: Normocephalic and atraumatic  Mouth/Throat:      Mouth: Mucous membranes are moist       Pharynx: Oropharynx is clear  Eyes:      Conjunctiva/sclera: Conjunctivae normal    Cardiovascular:      Rate and Rhythm: Normal rate and regular rhythm  Heart sounds: No murmur heard  Pulmonary:      Effort: Pulmonary effort is normal  No respiratory distress  Breath sounds: Normal breath sounds  Abdominal:      Palpations: Abdomen is soft  Tenderness: There is no abdominal tenderness  Musculoskeletal:      Cervical back: Neck supple  Skin:     General: Skin is warm and dry  Capillary Refill: Capillary refill takes less than 2 seconds  Neurological:      General: No focal deficit present  Mental Status: She is alert  Mental status is at baseline  Motor: Weakness present        Coordination: Coordination abnormal    Psychiatric:         Mood and Affect: Mood normal           Additional Data:     Labs:  Results from last 7 days   Lab Units 08/24/21  0612 08/20/21  0440   WBC Thousand/uL 6 21 7 74   HEMOGLOBIN g/dL 10 2* 10 4*   HEMATOCRIT % 31 8* 32 2*   PLATELETS Thousands/uL 130* 131*   NEUTROS PCT %  --  46   LYMPHS PCT %  --  42   MONOS PCT %  --  7   EOS PCT %  --  5     Results from last 7 days   Lab Units 08/25/21  0524 08/19/21  1029   SODIUM mmol/L 149* 144   POTASSIUM mmol/L 4 7 3 2*   CHLORIDE mmol/L 108 103   CO2 mmol/L 33* 32   BUN mg/dL 22 11   CREATININE mg/dL 0 98 1 05   ANION GAP mmol/L 8 9   CALCIUM mg/dL 9 9 9 8   ALBUMIN g/dL  --  2 9*   TOTAL BILIRUBIN mg/dL  --  0 17*   ALK PHOS U/L  --  57   ALT U/L  --  11*   AST U/L  --  8   GLUCOSE RANDOM mg/dL 235* 185*         Results from last 7 days   Lab Units 08/25/21  1101 08/25/21  0721 08/24/21  2117 08/24/21  1732 08/24/21  1043 08/24/21  0722 08/23/21  1709 08/23/21  1107 08/23/21  0725 08/22/21  2113 08/22/21  1623 08/22/21  1117   POC GLUCOSE mg/dl 304* 223* 224* 200* 251* 160* 111 238* 248* 235* 260* 159*     Results from last 7 days   Lab Units 08/19/21  1029   HEMOGLOBIN A1C % 6 8*     Results from last 7 days   Lab Units 08/22/21  2244 08/22/21  1930 08/22/21  1638 08/19/21  1808   LACTIC ACID mmol/L 1 8 2 8* 2 1* 2 6*       Lines/Drains:  Invasive Devices     Peripheral Intravenous Line            Peripheral IV 08/19/21 Right Antecubital 6 days          Drain            External Urinary Catheter 2 days                      Imaging: Reviewed radiology reports from this admission including: chest xray, abdominal/pelvic CT and CT head    Recent Cultures (last 7 days):   Results from last 7 days   Lab Units 08/19/21  1550 08/19/21  1540 08/19/21  1356   BLOOD CULTURE  No Growth After 5 Days  No Growth After 5 Days    --    URINE CULTURE   --   --  <10,000 cfu/ml        Last 24 Hours Medication List:   Current Facility-Administered Medications   Medication Dose Route Frequency Provider Last Rate    acetaminophen  650 mg Oral Q6H PRN Macie Varela MD      ARIPiprazole  30 mg Oral HS Macie Varela MD      baclofen  10 mg Oral TID Macie Varela MD      bisacodyl  10 mg Rectal Daily PRN Macie Varela MD      calcium carbonate-vitamin D  1 tablet Oral BID With Meals Susana Patton MD      cholecalciferol  1,000 Units Oral Daily Susana Patton MD      citalopram  40 mg Oral QAM Macie Varela MD      dextrose  75 mL/hr Intravenous Continuous Xavier Waters MD 75 mL/hr (08/25/21 1009)    enoxaparin  40 mg Subcutaneous Q24H 0142 Johnnie Oliver MD      ferrous sulfate  325 mg Oral BID With Meals Macie Varela MD      insulin lispro  1-5 Units Subcutaneous TID Say Bella MD      levothyroxine  25 mcg Oral Early Morning Macie Varela MD      LORazepam  0 5 mg Oral HS Macie Varela MD      oxybutynin  5 mg Oral Daily Kimi Morning, MD      pantoprazole  20 mg Oral BID AC Kimi Morning, MD      polyethylene glycol  17 g Oral Daily Kimi Morning, MD      pravastatin  40 mg Oral Daily With [employer], MD      senna  2 tablet Oral HS Kimi Morning, MD      traZODone  100 mg Oral HS Kimi Morning, MD      valproic acid  500 mg Oral BID Kimi Morning, MD          Today, Patient Was Seen By: Donn Weiss MD    **Please Note: This note may have been constructed using a voice recognition system  **

## 2021-08-25 NOTE — PROGRESS NOTES
[address] 63 y o  female MRN: [id number]  Unit/Bed#: W -01 Encounter: [id number]  Reason for Consult:  Hypernatremia    ASSESSMENT and PLAN:    61-year-old female with a past medical history of cerebral palsy, diabetes, dysphagia, hypothyroid, bipolar disorder, hyperlipidemia, GERD, who initially presents with decreased activity and concern for urinary tract infection  Jaycee Silva is initial concern for sepsis   Was initially started on intravenous antibiotics  Artisyulisajanneth Silva is also concern for constipation and bowel regimen was added     1) hypernatremia     -likely due to lack of free water and improved with D5W  -August 23rd-D5W and give saline due to need for resuscitation due to hypotension  -August 24th-sodium level rising 147  Will give half-normal saline to allow for volume and free water   -August 25th-had slightly brisk urine output but was less than 3 L  Sodium level rising to 149  Did take in 1 2 L yesterday      Plan  -give D5W today  -may need to check urine osmolarity if sodium does not improve  -repeat BMP in a m   -reviewed with primary team   May need to change to quarter normal saline blood sugar remains uncontrolled   -elevated blood sugars may also be driving the urine output     2) urinary tract infection-appears less likely based on urine culture     -history of recurrent UTI  -urinalysis with innumerable WBC, 4-10 RBC  -follows with Urology team and is on prophylactic nitrofurantoin as outpatient  -urine culture with mixed contaminant and antibiotics were discontinued per the primary team on August 21st urine culture     3) electrolytes     -initially with mild hypercalcemia-initial home calcium and vitamin-D regimen were held  -improved     -hypernatremia-required hypotonic fluids   Worsening    Restarted hypotonic fluid     -hypokalemia-required repletion initially   Stable     4) acid/base-stable     5) renal function-creatinine fluctuates between 0  9-1 21 mg/dL    remains at Baseline     6) hypotension-improved with volume expansion  Slightly low blood pressures overnight  Improved this morning        SUBJECTIVE / 24H INTERVAL HISTORY:    Blood pressures 09W to 134 systolic  Afebrile  Urine output 2 6 L yesterday  Patient asking to eat    OBJECTIVE:  Current Weight: Weight - Scale: 42 6 kg (93 lb 14 7 oz)  Vitals:    08/24/21 0724 08/24/21 1515 08/24/21 2121 08/25/21 0722   BP:  90/57 90/57 113/73   BP Location:  Left arm  Left arm   Pulse: 66 60 (!) 53 65   Resp:  18 16 16   Temp:  98 4 °F (36 9 °C) 97 5 °F (36 4 °C) 98 7 °F (37 1 °C)   TempSrc:  Axillary  Oral   SpO2: 95% 95% 90% 94%   Weight:       Height:           Intake/Output Summary (Last 24 hours) at 8/25/2021 0955  Last data filed at 8/25/2021 9739  Gross per 24 hour   Intake 1748 33 ml   Output 2660 ml   Net -911 67 ml     General: NAD  Skin: no rash  Eyes: anicteric sclera  ENT: moist mucous membrane  Neck: supple  Chest: CTA b/l, no ronchii, no wheeze, no rubs, no rales  CVS: s1s2, no murmur, no gallop, no rub  Abdomen: soft, nontender, nl sounds  Extremities: no significant edema LE b/l  : no boateng  Neuro:  Difficult to assess  Psych:   To assess    Medications:    Current Facility-Administered Medications:     acetaminophen (TYLENOL) oral suspension 650 mg, 650 mg, Oral, Q6H PRN, Gayla Griffiths MD    ARIPiprazole (ABILIFY) tablet 30 mg, 30 mg, Oral, HS, Gayla Griffiths MD, 30 mg at 08/24/21 1912    baclofen tablet 10 mg, 10 mg, Oral, TID, Gayla Griffiths MD, 10 mg at 08/25/21 3959    bisacodyl (DULCOLAX) rectal suppository 10 mg, 10 mg, Rectal, Daily PRN, Gayla Griffiths MD    calcium carbonate-vitamin D (OSCAL-D) 500 mg-200 units per tablet 1 tablet, 1 tablet, Oral, BID With Meals, Len Oliver MD, 1 tablet at 08/24/21 1911    cholecalciferol (VITAMIN D3) tablet 1,000 Units, 1,000 Units, Oral, Daily, Len Oliver MD, 1,000 Units at 08/25/21 0925    citalopram (CeleXA) tablet 40 mg, 40 mg, Oral, QAM, Alissa Carpio MD, 40 mg at 08/25/21 0925    dextrose 5 % infusion, 100 mL/hr, Intravenous, Continuous, Michela Loyola MD, Last Rate: 100 mL/hr at 08/25/21 0850, 100 mL/hr at 08/25/21 0850    enoxaparin (LOVENOX) subcutaneous injection 40 mg, 40 mg, Subcutaneous, Q24H [address], Alissa Carpio MD, 40 mg at 08/25/21 0356    ferrous sulfate tablet 325 mg, 325 mg, Oral, BID With Meals, Alissa Carpio MD, 325 mg at 08/25/21 0926    insulin lispro (HumaLOG) 100 units/mL subcutaneous injection 1-5 Units, 1-5 Units, Subcutaneous, TID AC, 2 Units at 08/25/21 0927 **AND** Fingerstick Glucose (POCT), , , TID AC, Elsi Goldstein MD    levothyroxine tablet 25 mcg, 25 mcg, Oral, Early Morning, Alissa Carpio MD, 25 mcg at 08/25/21 0506    LORazepam (ATIVAN) tablet 0 5 mg, 0 5 mg, Oral, HS, Alissa Carpio MD, 0 5 mg at 08/22/21 2151    oxybutynin (DITROPAN-XL) 24 hr tablet 5 mg, 5 mg, Oral, Daily, Alissa Carpio MD, 5 mg at 08/25/21 0926    pantoprazole (PROTONIX) EC tablet 20 mg, 20 mg, Oral, BID AC, Alissa Carpio MD, 20 mg at 08/25/21 0926    polyethylene glycol (MIRALAX) packet 17 g, 17 g, Oral, Daily, Alissa Caripo MD, 17 g at 08/21/21 0841    pravastatin (PRAVACHOL) tablet 40 mg, 40 mg, Oral, Daily With Sandip Singh MD, 40 mg at 08/24/21 1554    senna (SENOKOT) tablet 17 2 mg, 2 tablet, Oral, HS, Alissa Carpio MD, 17 2 mg at 08/22/21 2150    traZODone (DESYREL) tablet 100 mg, 100 mg, Oral, HS, Alissa Carpio MD, 100 mg at 08/22/21 2026    valproic acid (DEPAKENE) oral soln 500 mg, 500 mg, Oral, BID, Alissa Carpio MD, 500 mg at 08/25/21 1619    Laboratory Results:  Results from last 7 days   Lab Units 08/25/21  0524 08/24/21  1436 08/24/21  0612 08/23/21  0637 08/22/21  2244 08/22/21  0541 08/21/21  0508 08/20/21  0440 08/19/21  1029   WBC Thousand/uL  --   --  6 21  --   --  5 47 5 34 7 74 8 59   HEMOGLOBIN g/dL  --   --  10 2*  --  10 3* 10 4* 10 3* 10 4* 11 8   HEMATOCRIT %  --   --  31 8* --  31 8* 32 7* 31 4* 32 2* 36 8   PLATELETS Thousands/uL  --   --  130*  --   --  127* 129* 131* 143*   POTASSIUM mmol/L 4 7 3 9 4 0 4 1 4 1 3 6 3 9 4 0 3 2*   CHLORIDE mmol/L 108 108 109* 109* 109* 112* 112* 110* 103   CO2 mmol/L 33* 33* 32 30 29 30 30 31 32   BUN mg/dL 22 20 20 24 24 26* 18 12 11   CREATININE mg/dL 0 98 0 92 0 88 0 90 0 99 0 97 0 93 0 90 1 05   CALCIUM mg/dL 9 9 9 1 8 9 9 0 8 5 8 1* 8 2* 8 5 9 8

## 2021-08-25 NOTE — ASSESSMENT & PLAN NOTE
Blood Pressure: 113/73    · Pt has repeated bouts of hypotension with SBPs below 100  Resolve with hydration

## 2021-08-25 NOTE — CASE MANAGEMENT
CM follow up  CM communication with Aron Morejon, ([facility]) to report that Pt will be discharging home tmrw , due to need to allow time for a decrease in sodium level  Aron Morejon acknowledged and appreciative of the communication  Aron Morejon will provide Pts transportation at time of discharge

## 2021-08-25 NOTE — ASSESSMENT & PLAN NOTE
Recent Labs     08/24/21  0612 08/24/21  1436 08/25/21  0524   SODIUM 147* 146* 149*      · Given 1/2 NS per nephrology, hypernatremic this AM with Na of 149  · Started on D5, nephrology agreed  · Trend BMP for sodium reduction

## 2021-08-25 NOTE — ASSESSMENT & PLAN NOTE
Lab Results   Component Value Date    HGBA1C 6 8 (H) 08/19/2021       Recent Labs     08/24/21  1732 08/24/21  2117 08/25/21  0721 08/25/21  1101   POCGLU 200* 224* 223* 304*       Blood Sugar Average: Last 72 hrs:  (P) 215 [id number]     Non-insulin dependent  Home meds metformin 1000mg daily, linagliptin 5mg daily (started on 6/25/21 for recurrent hyperglycemia)       Plan:  - hypoglycemic protocol   - Monitor closely for glycemic control in the setting of D5 infusion  - diabetic diet level 2

## 2021-08-26 VITALS
HEIGHT: 58 IN | TEMPERATURE: 97.6 F | HEART RATE: 50 BPM | WEIGHT: 93.92 LBS | OXYGEN SATURATION: 96 % | DIASTOLIC BLOOD PRESSURE: 55 MMHG | BODY MASS INDEX: 19.71 KG/M2 | RESPIRATION RATE: 13 BRPM | SYSTOLIC BLOOD PRESSURE: 92 MMHG

## 2021-08-26 LAB
ANION GAP SERPL CALCULATED.3IONS-SCNC: 6 MMOL/L (ref 4–13)
BUN SERPL-MCNC: 26 MG/DL (ref 5–25)
CALCIUM SERPL-MCNC: 9 MG/DL (ref 8.3–10.1)
CHLORIDE SERPL-SCNC: 106 MMOL/L (ref 100–108)
CO2 SERPL-SCNC: 32 MMOL/L (ref 21–32)
CREAT SERPL-MCNC: 1.02 MG/DL (ref 0.6–1.3)
GFR SERPL CREATININE-BSD FRML MDRD: 60 ML/MIN/1.73SQ M
GLUCOSE SERPL-MCNC: 232 MG/DL (ref 65–140)
GLUCOSE SERPL-MCNC: 299 MG/DL (ref 65–140)
GLUCOSE SERPL-MCNC: 302 MG/DL (ref 65–140)
POTASSIUM SERPL-SCNC: 3.9 MMOL/L (ref 3.5–5.3)
SARS-COV-2 RNA RESP QL NAA+PROBE: NEGATIVE
SODIUM SERPL-SCNC: 144 MMOL/L (ref 136–145)

## 2021-08-26 PROCEDURE — 82948 REAGENT STRIP/BLOOD GLUCOSE: CPT

## 2021-08-26 PROCEDURE — 99239 HOSP IP/OBS DSCHRG MGMT >30: CPT | Performed by: INTERNAL MEDICINE

## 2021-08-26 PROCEDURE — 99232 SBSQ HOSP IP/OBS MODERATE 35: CPT | Performed by: INTERNAL MEDICINE

## 2021-08-26 PROCEDURE — U0003 INFECTIOUS AGENT DETECTION BY NUCLEIC ACID (DNA OR RNA); SEVERE ACUTE RESPIRATORY SYNDROME CORONAVIRUS 2 (SARS-COV-2) (CORONAVIRUS DISEASE [COVID-19]), AMPLIFIED PROBE TECHNIQUE, MAKING USE OF HIGH THROUGHPUT TECHNOLOGIES AS DESCRIBED BY CMS-2020-01-R: HCPCS

## 2021-08-26 PROCEDURE — 80048 BASIC METABOLIC PNL TOTAL CA: CPT | Performed by: INTERNAL MEDICINE

## 2021-08-26 PROCEDURE — U0005 INFEC AGEN DETEC AMPLI PROBE: HCPCS

## 2021-08-26 RX ADMIN — FERROUS SULFATE TAB 325 MG (65 MG ELEMENTAL FE) 325 MG: 325 (65 FE) TAB at 08:00

## 2021-08-26 RX ADMIN — Medication 1 TABLET: at 13:50

## 2021-08-26 RX ADMIN — Medication 1000 UNITS: at 08:33

## 2021-08-26 RX ADMIN — INSULIN LISPRO 3 UNITS: 100 INJECTION, SOLUTION INTRAVENOUS; SUBCUTANEOUS at 08:34

## 2021-08-26 RX ADMIN — BACLOFEN 10 MG: 10 TABLET ORAL at 08:32

## 2021-08-26 RX ADMIN — LEVOTHYROXINE SODIUM 25 MCG: 25 TABLET ORAL at 05:58

## 2021-08-26 RX ADMIN — OXYBUTYNIN 5 MG: 5 TABLET, FILM COATED, EXTENDED RELEASE ORAL at 08:32

## 2021-08-26 RX ADMIN — INSULIN LISPRO 2 UNITS: 100 INJECTION, SOLUTION INTRAVENOUS; SUBCUTANEOUS at 12:46

## 2021-08-26 RX ADMIN — CITALOPRAM HYDROBROMIDE 40 MG: 20 TABLET ORAL at 08:32

## 2021-08-26 RX ADMIN — DEXTROSE 75 ML/HR: 5 SOLUTION INTRAVENOUS at 12:46

## 2021-08-26 RX ADMIN — ENOXAPARIN SODIUM 40 MG: 40 INJECTION SUBCUTANEOUS at 08:33

## 2021-08-26 RX ADMIN — VALPROIC ACID 500 MG: 250 SOLUTION ORAL at 08:33

## 2021-08-26 RX ADMIN — PANTOPRAZOLE SODIUM 20 MG: 40 TABLET, DELAYED RELEASE ORAL at 08:32

## 2021-08-26 NOTE — ASSESSMENT & PLAN NOTE
Lab Results   Component Value Date    HGBA1C 6 8 (H) 08/19/2021       Recent Labs     08/25/21  0721 08/25/21  1101 08/25/21  1553 08/26/21  0803   POCGLU 223* 304* 277* 299*       Blood Sugar Average: Last 72 hrs:  (P) 425 [id number]         Plan:  - diabetic diet level 2   - Non-insulin dependent   Resume home meds metformin 1000mg daily, linagliptin 5mg daily

## 2021-08-26 NOTE — ASSESSMENT & PLAN NOTE
Blood Pressure: 92/55    · Pt has repeated bouts of hypotension with SBPs below 100  Resolve with hydration

## 2021-08-26 NOTE — CASE MANAGEMENT
TC to Lisa ([facility]) to update her that Pt is medically stable to discharge today, after 1500  Claude Connell acknowledged and will arrive at [phone] today to transport Pt back to her group home

## 2021-08-26 NOTE — DISCHARGE SUMMARY
[facility]  Discharge- Piedad Parikh 1960, 63 y o  female MRN: [id number]  Unit/Bed#: W -01 Encounter: [id number]  Primary Care Provider: Sophia Jose DO   Date and time admitted to hospital: 8/19/2021 10:07 AM    * Hypernatremia  Assessment & Plan  Recent Labs     08/24/21  1436 08/25/21  0524 08/26/21  0435   SODIUM 146* 149* 144      (Resolved)    Type 2 diabetes mellitus, without long-term current use of insulin [facility])  Assessment & Plan  Lab Results   Component Value Date    HGBA1C 6 8 (H) 08/19/2021       Recent Labs     08/25/21  0721 08/25/21  1101 08/25/21  1553 08/26/21  0803   POCGLU 223* 304* 277* 299*       Blood Sugar Average: Last 72 hrs:  (P) 926 [id number]         Plan:  - diabetic diet level 2   - Non-insulin dependent  Resume home meds metformin 1000mg daily, linagliptin 5mg daily      Hypothyroidism  Assessment & Plan  TSH WNL  Continue home levothyroxine 25mcg daily     Hyperlipidemia  Assessment & Plan  Lab Results   Component Value Date    CHOLESTEROL 157 04/08/2021    TRIG 132 04/08/2021    HDL 47 04/08/2021    LDLCALC 84 04/08/2021       Continue statin     Dysphagia  Assessment & Plan  · Moderate oral/pharyngeal dysphagia  Follows with GI    · EGD 10/26/20 with moderate gastritis and no esophageal stricture/narrowing or web seen  Pathology negative for h pylori, intestinal metaplasia, and EOE  · Honey thickened liquid diet per prior speech evaluation  Plan:   - diet: dysphagia 1 pureed, honey thick liquid; diabetic diet carb controlled level 2   - medications crushed   - patient cleared for discharge per speech eval    Hypotension  Assessment & Plan  Blood Pressure: 92/55    · Pt has repeated bouts of hypotension with SBPs below 100  Resolve with hydration       Constipation  Assessment & Plan  Chronic: Continue home regimen         Bipolar disorder ([facility] [address] )  Assessment & Plan  Continue home medication regimen:  - abilify 30mg at bedtime  - ativan 0 5mg at bedtime   - citalopram 40mg daily   - valproic acid 500mg BID   - trazodone 100mg at bedtime     Cerebral palsy [facility])  Assessment & Plan  Patient largely non-verbal at baseline       Medical Problems     Resolved Problems  Date Reviewed: 8/25/2021        Resolved    UTI (urinary tract infection) 8/24/2021     Resolved by  Rosa Isela Garcia MD    Hypokalemia 8/24/2021     Resolved by  Rosa Isela Garcia MD    Hypercalcemia 8/22/2021     Resolved by  Rosa Isela Garcia MD              Discharging Resident: Rosa Isela Garcia MD  Discharging Attending: Cyn Omalley MD  PCP: Austin Logan DO  Admission Date:   Admission Orders (From admission, onward)     Ordered        08/20/21 1314  Inpatient Admission  Once         08/19/21 1635  Place in Observation  Once                   Discharge Date: 08/26/21    Consultations During Hospital Stay:  · Nephrology    Procedures Performed:   · None    Significant Findings / Test Results:   · None    Incidental Findings:   · None    Test Results Pending at Discharge (will require follow up): · None     Outpatient Tests Requested:  · None    Complications:  None    Reason for Admission:  Decreased oral intake in the setting of suspected UTI    Hospital Course:   Lindsay Blas a 63 y o  female patient who originally presented to the hospital on 8/19/2021 due to change in functional status secondary to suspected UTI   UA showed bacteria and white blood cells   Patient was treated empirically with IV ceftriaxone pending culture data   Urine culture show no growth to date   DC'ed IV antibiotics   Monitor clinically off antibiotics for 24 hours   Patient found have a sodium level of 147 this morning   Changed fluids from sodium chloride to D5 normal saline   F/u BMP normal prior to discharge  Plan to transfer to home institution for further therapy and monitoring  Please see above list of diagnoses and related plan for additional information       Condition at Discharge: fair    Discharge Day Visit / Exam:   * Please refer to separate progress note for these details *    Discussion with Family: Updated  (Caregiver) via phone  Discharge instructions/Information to patient and family:   See after visit summary for information provided to patient and family  Provisions for Follow-Up Care:  See after visit summary for information related to follow-up care and any pertinent home health orders  Disposition:   Nursing facility    Planned Readmission:  None    Discharge Medications:  See after visit summary for reconciled discharge medications provided to patient and/or family        **Please Note: This note may have been constructed using a voice recognition system**

## 2021-08-26 NOTE — PLAN OF CARE
Problem: Potential for Falls  Goal: Patient will remain free of falls  Description: INTERVENTIONS:  - Educate patient/family on patient safety including physical limitations  - Instruct patient to call for assistance with activity   - Consult OT/PT to assist with strengthening/mobility   - Keep Call bell within reach  - Keep bed low and locked with side rails adjusted as appropriate  - Keep care items and personal belongings within reach  - Initiate and maintain comfort rounds  - Make Fall Risk Sign visible to staff  - Offer Toileting every 2 Hours, in advance of need  - Initiate/Maintain BED alarm  - Apply yellow socks and bracelet for high fall risk patients  - Consider moving patient to room near nurses station  Outcome: Progressing     Problem: MOBILITY - ADULT  Goal: Maintain or return to baseline ADL function  Description: INTERVENTIONS:  -  Assess patient's ability to carry out ADLs; assess patient's baseline for ADL function and identify physical deficits which impact ability to perform ADLs (bathing, care of mouth/teeth, toileting, grooming, dressing, etc )  - Assess/evaluate cause of self-care deficits   - Assess range of motion  - Assess patient's mobility; develop plan if impaired  - Assess patient's need for assistive devices and provide as appropriate  - Encourage maximum independence but intervene and supervise when necessary  - Involve family in performance of ADLs  - Assess for home care needs following discharge   - Consider OT consult to assist with ADL evaluation and planning for discharge  - Provide patient education as appropriate  Outcome: Progressing  Goal: Maintains/Returns to pre admission functional level  Description: INTERVENTIONS:  - Perform BMAT or MOVE assessment daily    - Set and communicate daily mobility goal to care team and patient/family/caregiver  - Collaborate with rehabilitation services on mobility goals if consulted  - Perform Range of Motion 4 times a day    - Reposition patient every 2 hours  - Dangle patient 2 times a day  - Stand patient 2 times a day  - Ambulate patient 2 times a day  - Out of bed to chair 3 times a day   - Out of bed for meals 3 times a day  - Out of bed for toileting  - Record patient progress and toleration of activity level   Outcome: Progressing     Problem: Nutrition/Hydration-ADULT  Goal: Nutrient/Hydration intake appropriate for improving, restoring or maintaining nutritional needs  Description: Monitor and assess patient's nutrition/hydration status for malnutrition  Collaborate with interdisciplinary team and initiate plan and interventions as ordered  Monitor patient's weight and dietary intake as ordered or per policy  Utilize nutrition screening tool and intervene as necessary  Determine patient's food preferences and provide high-protein, high-caloric foods as appropriate       INTERVENTIONS:  - Monitor oral intake, urinary output, labs, and treatment plans  - Assess nutrition and hydration status and recommend course of action  - Evaluate amount of meals eaten  - Assist patient with eating if necessary   - Allow adequate time for meals  - Recommend/ encourage appropriate diets, oral nutritional supplements, and vitamin/mineral supplements  - Order, calculate, and assess calorie counts as needed  - Recommend, monitor, and adjust tube feedings and TPN/PPN based on assessed needs  - Assess need for intravenous fluids  - Provide specific nutrition/hydration education as appropriate  - Include patient/family/caregiver in decisions related to nutrition  Outcome: Progressing

## 2021-08-26 NOTE — CASE MANAGEMENT
CM follow up  TC to STEP by STEP Lisa to discuss Pts discharge  Pt will need clearance from Nephrology to discharge  [address] reports that a New COVID-19 test must be done, as the last test () has , by covid-19 guidelines for [facility]  CM reported same to [facility]  COVID-19 test will be ordered  Statement Selected

## 2021-08-26 NOTE — PROGRESS NOTES
[address] 63 y o  female MRN: [id number]  Unit/Bed#: W -01 Encounter: [id number]  Reason for Consult: hypernatremia    ASSESSMENT and PLAN:    61-year-old female with a past medical history of cerebral palsy, diabetes, dysphagia, hypothyroid, bipolar disorder, hyperlipidemia, GERD, who initially presents with decreased activity and concern for urinary tract infection  Vanessa Arana is initial concern for sepsis   Was initially started on intravenous antibiotics  Vanessa Arana is also concern for constipation and bowel regimen was added     1) hypernatremia     -likely due to lack of free water and improved with D5W  -August 23rd-D5W and give saline due to need for resuscitation due to hypotension  -August 24th-sodium level rising 147  Will give half-normal saline to allow for volume and free water   -August 25th-had slightly brisk urine output but was less than 3 L  Sodium level rising to 149  Did take in 1 2 L yesterday  On D5W  - august 26 - creat 1, Na improving 144       Plan  - complete D5W today  - encourage oral intake - at least 2 L a day  - repeat BMP in a m    - have messaged primary team Att to review  - high risk of readmission for hypernatremia     2) urinary tract infection-appears less likely based on urine culture     -history of recurrent UTI  -urinalysis with innumerable WBC, 4-10 RBC  -follows with Urology team and is on prophylactic nitrofurantoin as outpatient  -urine culture with mixed contaminant and antibiotics were discontinued per the primary team on August 21st urine culture     3) electrolytes     -initially with mild hypercalcemia-initial home calcium and vitamin-D regimen were held  -improved     -hypernatremia-required hypotonic fluids   improved     -hypokalemia-required repletion initially   Stable     4) acid/base-stable     5) renal function-creatinine fluctuates between 0 9-1 21 mg/dL     remains at Baseline       6) hypotension-improved with volume expansion  Still with marginal BP  No sign of active infection per primary team  Unclear if related to baclofen  Have reached out to Primary Att to review         SUBJECTIVE / 24H INTERVAL HISTORY:    SBP  syst  Pt sitting in chair  Non verbal  UOP 2 L      OBJECTIVE:  Current Weight: Weight - Scale: 42 6 kg (93 lb 14 7 oz)  Vitals:    08/25/21 0722 08/25/21 1500 08/25/21 2130 08/26/21 0714   BP: 113/73 108/59 94/55 92/55   BP Location: Left arm Left arm Left arm Left arm   Pulse: 65 71 77 (!) 50   Resp: 16 16 16 13   Temp: 98 7 °F (37 1 °C) 97 7 °F (36 5 °C)  97 6 °F (36 4 °C)   TempSrc: Oral Oral Oral Axillary   SpO2: 94% 97% 98% 96%   Weight:       Height:           Intake/Output Summary (Last 24 hours) at 8/26/2021 0859  Last data filed at 8/26/2021 0243  Gross per 24 hour   Intake 1490 ml   Output 2005 ml   Net -515 ml     General: NAD  Skin: no rash  Eyes: anicteric sclera  ENT: moist mucous membrane  Neck: supple  Chest: CTA b/l, no ronchii, no wheeze, no rubs, no rales  CVS: s1s2, no murmur, no gallop, no rub  Abdomen: soft, nontender, nl sounds  Extremities: no edema LE b/l  : no boateng  Neuro: non verbal  Awake    Psych: awake    Medications:    Current Facility-Administered Medications:     acetaminophen (TYLENOL) oral suspension 650 mg, 650 mg, Oral, Q6H PRN, Chanelle Castillo MD    ARIPiprazole (ABILIFY) tablet 30 mg, 30 mg, Oral, HS, Chanelle Castillo MD, 30 mg at 08/25/21 2204    baclofen tablet 10 mg, 10 mg, Oral, TID, Chanelle Castillo MD, 10 mg at 08/26/21 1292    bisacodyl (DULCOLAX) rectal suppository 10 mg, 10 mg, Rectal, Daily PRN, Chanelle Castillo MD    calcium carbonate-vitamin D (OSCAL-D) 500 mg-200 units per tablet 1 tablet, 1 tablet, Oral, BID With Meals, Brandon Hennessy MD, 1 tablet at 08/25/21 1815    cholecalciferol (VITAMIN D3) tablet 1,000 Units, 1,000 Units, Oral, Daily, Brandon Hennessy MD, 1,000 Units at 08/26/21 0833    citalopram (CeleXA) tablet 40 mg, 40 mg, Oral, QAM, Aspen Joseph Munoz MD, 40 mg at 08/26/21 0832    dextrose 5 % infusion, 75 mL/hr, Intravenous, Continuous, Jama Sunshine MD, Last Rate: 75 mL/hr at 08/25/21 1009, 75 mL/hr at 08/25/21 1009    enoxaparin (LOVENOX) subcutaneous injection 40 mg, 40 mg, Subcutaneous, Q24H [facility], Trent Castillo MD, 40 mg at 08/26/21 8835    ferrous sulfate tablet 325 mg, 325 mg, Oral, BID With Meals, Trent Castillo MD, 325 mg at 08/26/21 0800    insulin lispro (HumaLOG) 100 units/mL subcutaneous injection 1-5 Units, 1-5 Units, Subcutaneous, TID AC, 3 Units at 08/26/21 0834 **AND** Fingerstick Glucose (POCT), , , TID AC, Laverne Ramires MD    levothyroxine tablet 25 mcg, 25 mcg, Oral, Early Morning, Trent Castillo MD, 25 mcg at 08/26/21 0558    LORazepam (ATIVAN) tablet 0 5 mg, 0 5 mg, Oral, HS, Trent Castillo MD, 0 5 mg at 08/22/21 2151    oxybutynin (DITROPAN-XL) 24 hr tablet 5 mg, 5 mg, Oral, Daily, Trent Castillo MD, 5 mg at 08/26/21 0832    pantoprazole (PROTONIX) EC tablet 20 mg, 20 mg, Oral, BID AC, Trent Castillo MD, 20 mg at 08/26/21 7227    polyethylene glycol (MIRALAX) packet 17 g, 17 g, Oral, Daily, Trent Castillo MD, 17 g at 08/21/21 0841    pravastatin (PRAVACHOL) tablet 40 mg, 40 mg, Oral, Daily With Mikcy Jesus MD, 40 mg at 08/25/21 1815    senna (SENOKOT) tablet 17 2 mg, 2 tablet, Oral, HS, Trent Castillo MD, 17 2 mg at 08/25/21 2205    traZODone (DESYREL) tablet 100 mg, 100 mg, Oral, HS, Trent Castillo MD, 100 mg at 08/25/21 2207    valproic acid (DEPAKENE) oral soln 500 mg, 500 mg, Oral, BID, Trent Castillo MD, 500 mg at 08/26/21 0114    Laboratory Results:  Results from last 7 days   Lab Units 08/26/21  0435 08/25/21  0524 08/24/21  1436 08/24/21  0612 08/23/21  0637 08/22/21  2244 08/22/21  0541 08/21/21  0508 08/20/21  0440 08/19/21  1029   WBC Thousand/uL  --   --   --  6 21  --   --  5 47 5 34 7 74 8 59   HEMOGLOBIN g/dL  --   --   --  10 2*  --  10 3* 10 4* 10 3* 10 4* 11 8   HEMATOCRIT %  --   --   -- 31 8*  --  31 8* 32 7* 31 4* 32 2* 36 8   PLATELETS Thousands/uL  --   --   --  130*  --   --  127* 129* 131* 143*   POTASSIUM mmol/L 3 9 4 7 3 9 4 0 4 1 4 1 3 6 3 9 4 0 3 2*   CHLORIDE mmol/L 106 108 108 109* 109* 109* 112* 112* 110* 103   CO2 mmol/L 32 33* 33* 32 30 29 30 30 31 32   BUN mg/dL 26* 22 20 20 24 24 26* 18 12 11   CREATININE mg/dL 1 02 0 98 0 92 0 88 0 90 0 99 0 97 0 93 0 90 1 05   CALCIUM mg/dL 9 0 9 9 9 1 8 9 9 0 8 5 8 1* 8 2* 8 5 9 8

## 2021-08-27 ENCOUNTER — TRANSITIONAL CARE MANAGEMENT (OUTPATIENT)
Dept: FAMILY MEDICINE CLINIC | Facility: CLINIC | Age: 61
End: 2021-08-27

## 2021-08-27 NOTE — UTILIZATION REVIEW
Notification of Discharge   This is a Notification of Discharge from our facility [address]  Please be advised that this patient has been discharge from our facility  Below you will find the admission and discharge date and time including the patients disposition  UTILIZATION REVIEW CONTACT:  Maya Mishra  Utilization   Network Utilization Review Department  Phone: [phone] x carefully listen to the prompts  All voicemails are confidential   Email: [email or web address]     PHYSICIAN ADVISORY SERVICES:  FOR [id number] REVIEW - MEDICAL NECESSITY DENIAL  Phone: [phone]  Fax: [phone]  Email: [email or web address]     PRESENTATION DATE: 8/19/2021 10:07 AM  OBERVATION ADMISSION DATE:   INPATIENT ADMISSION DATE: 8/20/21  1:14 PM   DISCHARGE DATE: 8/26/2021  4:39 PM  DISPOSITION: Non [facility] SN[facility]/SNU Non [facility] SN[facility]/TC[facility]/SNU      IMPORTANT INFORMATION:  Send all requests for admission clinical reviews, approved or denied determinations and any other requests to dedicated fax number below belonging to the campus where the patient is receiving treatment   List of dedicated fax numbers:  [address] [place] [address] DENIALS (Administrative/Medical Necessity) [phone]   [address] (Maternity/NICU/Pediatrics) [phone]   Nam Henriquez [phone]   Umberto Peralta [phone]   Patricia Ye [phone]   [facility] [address] [place] [address] [phone]   [facility]  [phone]   [facility] [address]  [address] [facility] [address] [facility] [phone]

## 2021-09-01 ENCOUNTER — TELEMEDICINE (OUTPATIENT)
Dept: FAMILY MEDICINE CLINIC | Facility: CLINIC | Age: 61
End: 2021-09-01

## 2021-09-01 DIAGNOSIS — I95.9 HYPOTENSION, UNSPECIFIED HYPOTENSION TYPE: Primary | ICD-10-CM

## 2021-09-01 PROCEDURE — 1036F TOBACCO NON-USER: CPT | Performed by: FAMILY MEDICINE

## 2021-09-01 PROCEDURE — 99213 OFFICE O/P EST LOW 20 MIN: CPT | Performed by: FAMILY MEDICINE

## 2021-09-01 NOTE — ASSESSMENT & PLAN NOTE
- Blood pressure today provided by care giver/ nurse: 122/47   - Patient with reported hypotension range SBP 80's - 100's with elevated heart rate: 100's  Patient was admitted in the hospital and was noted to be hypotensive and hyponatremic   Vital signs and hyponatremia improved with IV hydration    - Advised and encouraged adequate hydration   - Nurse to monitor vital signs Q shift and provide blood pressure log   - Will follow-up in 2 days if symptoms persist

## 2021-09-01 NOTE — PROGRESS NOTES
Virtual Brief Visit    Verification of patient location:    Patient is located in the following state in which I hold an active license [place]      Assessment/Plan:    Problem List Items Addressed This Visit        Cardiovascular and Mediastinum    Hypotension - Primary     - Blood pressure today provided by care giver/ nurse: 122/47   - Patient with reported hypotension range SBP 80's - 100's with elevated heart rate: 100's  Patient was admitted in the hospital and was noted to be hypotensive and hyponatremic  Vital signs and hyponatremia improved with IV hydration    - Advised and encouraged adequate hydration   - Nurse to monitor vital signs Q shift and provide blood pressure log   - Will follow-up in 2 days if symptoms persist                       Reason for visit is   Chief Complaint   Patient presents with    Virtual Brief Visit        Encounter provider Chas Cheney MD    Provider located at [address] [place] [id number] [phone]    Recent Visits  No visits were found meeting these conditions  Showing recent visits within past 7 days and meeting all other requirements  Today's Visits  Date Type Provider Dept   09/01/21 Telemedicine Tima Doherty Ace, Do [facility] 11 today's visits and meeting all other requirements  Future Appointments  No visits were found meeting these conditions  Showing future appointments within next 150 days and meeting all other requirements       After connecting through [employer] and patient was informed that this is a secure, HIPAA-compliant platform  She agrees to proceed  , the patient was identified by name and date of birth  Crispin Garza was informed that this is a telemedicine visit and that the visit is being conducted through [address] [place] [address] Now and patient was informed that this is a secure, HIPAA-compliant platform  She agrees to proceed  My office door was closed   No one else was in the room  She acknowledged consent and understanding of privacy and security of the platform  The patient has agreed to participate and understands she can discontinue the visit at any time  Patient is aware this is a billable service  Subjective    Germán Wood is a 63 y o  female   Patient presents to the clinic for hospital follow up  Had exposure to COVID and unable to be present in the clinic  Ms Graham, who is patient's nurse/caretaker provided history  Patient was hospitalized from 08/19 to 08/26 initially thought to have UTI but urine culture came back unremarkable  Caretaker reports that patient's blood pressure has been low  SBP range 80's - 100's  With transient elevated heart rates in the 100's  Elevated heart rate is usually noted mostly after activity and on exertion  Reports that patient has been staying hydrated - takes about 70-84 oz of water and is also voiding and having regular bowel movement  Caretaker also noted that patient has been relatively weak, and required multiple assist for daily activities  Deneis any other systemic complains or concerns           Past Medical History:   Diagnosis Date    Adjustment disorder     Altered mental status 12/11/2015    Anemia     Bipolar 1 disorder (HCC)     Cerebral palsy ([address] )     Chronic hypernatremia 2/6/2016    Closed fracture of left hip ([address] ) 1/19/2016    Closed left hip fracture (HCC)     no surgery    Constipation     Dehydration 2/20/2016    Diabetes mellitus ([address] )     Disease of thyroid gland     Diverticulosis     Fracture of multiple ribs of right side     Hip fracture ([address] ) 07/26/2015    left    Hyperlipidemia     Hypernatremia 12/28/2018    Hypotension     Impulse control disorder     Incontinence     Intellectual disability due to developmental disorder, unspecified     Microalbuminuria     Osteopathia     Osteoporosis     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HCC)     Thrombocytopenia ([address] ) 7/31/2015 Past Surgical History:   Procedure Laterality Date    ABDOMINAL SURGERY      COLECTOMY MIN      re-anastomosis 7/22/16    COLONOSCOPY N/A 7/21/2016    Procedure: COLONOSCOPY;  Surgeon: Dimitri Inman MD;  [facility]; Service:     COLONOSCOPY N/A 1/31/2016    Procedure: COLONOSCOPY;  Surgeon: Dimitri Inman MD;  [facility];  Service:     COLONOSCOPY N/A 7/25/2017    Procedure: COLONOSCOPY;  Surgeon: Dimitri Inman MD;  [facility];   Service: Colorectal    COLOSTOMY      EXPLORATORY LAPAROTOMY W/ BOWEL RESECTION N/A 1/31/2016    Procedure: exploratory laparotomy, left sigmoidectomy, coloproctostomy, take down splenic flexure, loop colostomy;  Surgeon: Dimitri Inman MD;  [facility];  Service:     DC CLOSE ENTEROSTOMY N/A 7/22/2016    Procedure: SEGMENTAL COLECTOMY WITH COLOCOLOSTOMY;  Surgeon: Dimitri Inman MD;  [facility];  Service: Colorectal    UPPER GASTROINTESTINAL ENDOSCOPY         Current Outpatient Medications   Medication Sig Dispense Refill    acetaminophen (TYLENOL) 325 mg tablet TAKE 2 TABLETS BY MOUTH (650MG) EVERY 6 HOURS AS NEEDED FOR PAIN / FEVER GREATER THAN 100 4 10 tablet 11    ARIPiprazole (ABILIFY) 30 mg tablet Take 1 tablet (30 mg) by mouth daily at 8 pm  0    baclofen 10 mg tablet Take 1 tablet (10 mg total) by mouth 3 (three) times a day 90 tablet 11    benzonatate (TESSALON PERLES) 100 mg capsule Take 1 capsule (100 mg total) by mouth every 8 (eight) hours as needed for cough 30 capsule 0    calcium carbonate (OS-MORRIS) 600 MG tablet Take 1 tablet (600 mg total) by mouth 2 (two) times a day with meals 60 tablet 2    carbamide peroxide (DEBROX) 6 5 % otic solution Administer 2 drops into both ears daily as needed 2 drops in the affected ear prn x 5 days      carboxymethylcellulose (REFRESH TEARS) 0 5 % SOLN 1 drop 3 (three) times a day as needed for dry eyes      Cholecalciferol (Vitamin D) 50 MCG (2000 UT) tablet Take 1 tablet (2,000 Units total) by mouth daily 30 tablet 5    Citalopram Hydrobromide (CELEXA PO) Take 40 mg by mouth every morning        denosumab (PROLIA) 60 mg/mL Inject 1 mL (60 mg total) under the skin once for 1 dose 1 mL 1    docosanol (ABREVA) 10 % Apply topically 5 (five) times a day Apply and rub in 5x a day until healed as needed for lesion  0    ferrous sulfate 324 (65 Fe) mg Take 325 mg by mouth 2 (two) times a day   fexofenadine (ALLEGRA) 180 MG tablet Take 180 mg by mouth as needed        fluticasone (FLONASE) 50 mcg/act nasal spray 1 spray into each nostril daily as needed for rhinitis or allergies 1 Bottle 1    guaiFENesin (ROBITUSSIN) 100 MG/5ML oral liquid Take 200 mg by mouth every 8 (eight) hours as needed 2 tsp every 8 hours as needed for cough      hydrocortisone 1 % cream Apply topically to affected area twice daily as needed for rash 30 g 0    levothyroxine (SYNTHROID) 25 mcg tablet Take 1 tablet (25 mcg) by mouth every morning  0    linaGLIPtin 5 MG TABS Take 5 mg by mouth daily 90 tablet 0    LORazepam (ATIVAN) 0 5 mg tablet Take 0 5 mg by mouth 2 (two) times a day          metFORMIN (GLUCOPHAGE) 1000 MG tablet Take 1 tablet (1,000 mg total) by mouth 2 (two) times a day with meals 60 tablet 2    neomycin-bacitracin-polymyxin b (NEOSPORIN) ointment Apply 1 application topically 2 (two) times a day as needed      nitrofurantoin (MACRODANTIN) 50 mg capsule Take 50 mg by mouth daily at bedtime      olopatadine HCl (PATADAY) 0 2 % opth drops Administer 1 drop to both eyes as needed      omeprazole (PriLOSEC) 20 mg delayed release capsule Take 1 capsule (20 mg total) by mouth 2 (two) times a day before meals 60 capsule 2    Oxybutynin Chloride (DITROPAN XL PO) Take 5 mg by mouth 3 (three) times a day       polyethylene glycol (GLYCOLAX) 17 GM/SCOOP powder DISSOLVE 17GM (1 CAPFUL) IN 8 OZ FLUIDS AND CONSUME BY MOUTH EVERY MORNING *HOLD 1 DAY FOR LOOSE STOOLS* (CONSTIPATION) 510 g 10    simvastatin (ZOCOR) 20 mg tablet Take 1 tablet (20 mg total) by mouth daily with dinner at 4 pm for hyperlipidemia      Starch, Thickening, POWD Take by mouth daily Use on food and liquid as directed 1020 g 3    traZODone (DESYREL) 100 mg tablet Take 1 tablet (100 mg total) by mouth daily at  8 pm for depression  0    valACYclovir (VALTREX) 1,000 mg tablet Take 1 tablet (1,000 mg total) by mouth 3 (three) times a day for 7 days 21 tablet 0    valproic acid (DEPAKENE) 250 MG/5ML soln Take 10 mL (500 mg total) by mouth 2 (two) times a day 600 mL 1    Vitamins A & D (VITAMIN A & D) ointment Apply topically as needed for dry skin       No current facility-administered medications for this visit  No Known Allergies    Review of Systems   Reason unable to perform ROS: ROS limited secondary  to aphasia    Constitutional: Positive for fatigue  Negative for fever  Respiratory: Negative for cough, chest tightness and shortness of breath  Gastrointestinal: Negative for abdominal pain, nausea and vomiting  Genitourinary: Negative for dysuria  Musculoskeletal: Positive for gait problem (ambulates with a walker)  There were no vitals filed for this visit  HR: 75: Pulse: R: 18 BP: 122/47   Temperature: 97 7  Vital signs provided by nurse  I spent 18 minutes directly with the patient during this visit    [id number] [facility] verbally agrees to participate in [employer]  Pt is aware that [employer] could be limited without vital signs or the ability to perform a full hands-on physical Mara understands she or the provider may request at any time to terminate the video visit and request the patient to seek care or treatment in person

## 2021-09-06 LAB — RIGHT EYE DIABETIC RETINOPATHY: NORMAL

## 2021-09-09 ENCOUNTER — VBI (OUTPATIENT)
Dept: ADMINISTRATIVE | Facility: OTHER | Age: 61
End: 2021-09-09

## 2021-09-14 ENCOUNTER — TELEPHONE (OUTPATIENT)
Dept: FAMILY MEDICINE CLINIC | Facility: CLINIC | Age: 61
End: 2021-09-14

## 2021-09-14 ENCOUNTER — OFFICE VISIT (OUTPATIENT)
Dept: FAMILY MEDICINE CLINIC | Facility: CLINIC | Age: 61
End: 2021-09-14

## 2021-09-14 VITALS
RESPIRATION RATE: 12 BRPM | WEIGHT: 104.6 LBS | DIASTOLIC BLOOD PRESSURE: 72 MMHG | TEMPERATURE: 97.6 F | SYSTOLIC BLOOD PRESSURE: 114 MMHG | BODY MASS INDEX: 21.96 KG/M2 | HEIGHT: 58 IN | HEART RATE: 60 BPM

## 2021-09-14 DIAGNOSIS — R13.10 DYSPHAGIA, UNSPECIFIED TYPE: ICD-10-CM

## 2021-09-14 DIAGNOSIS — E11.00 TYPE 2 DIABETES MELLITUS WITH HYPEROSMOLARITY WITHOUT COMA, WITHOUT LONG-TERM CURRENT USE OF INSULIN (HCC): ICD-10-CM

## 2021-09-14 DIAGNOSIS — R26.2 AMBULATORY DYSFUNCTION: ICD-10-CM

## 2021-09-14 DIAGNOSIS — Z00.00 MEDICARE ANNUAL WELLNESS VISIT, SUBSEQUENT: Primary | ICD-10-CM

## 2021-09-14 PROCEDURE — G0439 PPPS, SUBSEQ VISIT: HCPCS | Performed by: FAMILY MEDICINE

## 2021-09-14 PROCEDURE — 3008F BODY MASS INDEX DOCD: CPT | Performed by: FAMILY MEDICINE

## 2021-09-14 PROCEDURE — 1036F TOBACCO NON-USER: CPT | Performed by: FAMILY MEDICINE

## 2021-09-14 NOTE — PATIENT INSTRUCTIONS
Medicare Preventive Visit Patient Instructions  Thank you for completing your Welcome to Medicare Visit or Medicare Annual Wellness Visit today  Your next wellness visit will be due in one year (9/15/2022)  The screening/preventive services that you may require over the next 5-10 years are detailed below  Some tests may not apply to you based off risk factors and/or age  Screening tests ordered at today's visit but not completed yet may show as past due  Also, please note that scanned in results may not display below  Preventive Screenings:  Service Recommendations Previous Testing/Comments   Colorectal Cancer Screening  * Colonoscopy    * Fecal Occult Blood Test (FOBT)/Fecal Immunochemical Test (FIT)  * Fecal DNA/Cologuard Test  * Flexible Sigmoidoscopy Age: 54-65 years old   Colonoscopy: every 10 years (may be performed more frequently if at higher risk)  OR  FOBT/FIT: every 1 year  OR  Cologuard: every 3 years  OR  Sigmoidoscopy: every 5 years  Screening may be recommended earlier than age 48 if at higher risk for colorectal cancer  Also, an individualized decision between you and your healthcare provider will decide whether screening between the ages of 74-80 would be appropriate  Colonoscopy: 07/25/2017  FOBT/FIT: 09/08/2020  Cologuard: Not on file  Sigmoidoscopy: Not on file    Screening Current     Breast Cancer Screening Age: 36 years old  Frequency: every 1-2 years  Not required if history of left and right mastectomy Mammogram: 06/04/2019        Cervical Cancer Screening Between the ages of 21-29, pap smear recommended once every 3 years  Between the ages of 33-67, can perform pap smear with HPV co-testing every 5 years     Recommendations may differ for women with a history of total hysterectomy, cervical cancer, or abnormal pap smears in past  Pap Smear: 08/10/2021    Screening Current   Hepatitis C Screening Once for adults born between 1945 and 1965  More frequently in patients at high risk for Hepatitis C Hep C Antibody: 07/13/2021    Screening Current   Diabetes Screening 1-2 times per year if you're at risk for diabetes or have pre-diabetes Fasting glucose: 95 mg/dL   A1C: 6 8 %    Screening Not Indicated  History Diabetes   Cholesterol Screening Once every 5 years if you don't have a lipid disorder  May order more often based on risk factors  Lipid panel: 04/08/2021    Screening Not Indicated  History Lipid Disorder     Other Preventive Screenings Covered by Medicare:  1  Abdominal Aortic Aneurysm (AAA) Screening: covered once if your at risk  You're considered to be at risk if you have a family history of AAA  2  Lung Cancer Screening: covers low dose CT scan once per year if you meet all of the following conditions: (1) Age 50-69; (2) No signs or symptoms of lung cancer; (3) Current smoker or have quit smoking within the last 15 years; (4) You have a tobacco smoking history of at least 30 pack years (packs per day multiplied by number of years you smoked); (5) You get a written order from a healthcare provider  3  Glaucoma Screening: covered annually if you're considered high risk: (1) You have diabetes OR (2) Family history of glaucoma OR (3)  aged 48 and older OR (3)  American aged 72 and older  3  Osteoporosis Screening: covered every 2 years if you meet one of the following conditions: (1) You're estrogen deficient and at risk for osteoporosis based off medical history and other findings; (2) Have a vertebral abnormality; (3) On glucocorticoid therapy for more than 3 months; (4) Have primary hyperparathyroidism; (5) On osteoporosis medications and need to assess response to drug therapy  · Last bone density test (DXA Scan): 03/04/2020   5  HIV Screening: covered annually if you're between the age of 15-65  Also covered annually if you are younger than 13 and older than 72 with risk factors for HIV infection   For pregnant patients, it is covered up to 3 times per pregnancy  Immunizations:  Immunization Recommendations   Influenza Vaccine Annual influenza vaccination during flu season is recommended for all persons aged >= 6 months who do not have contraindications   Pneumococcal Vaccine (Prevnar and Pneumovax)  * Prevnar = PCV13  * Pneumovax = PPSV23   Adults 25-60 years old: 1-3 doses may be recommended based on certain risk factors  Adults 72 years old: Prevnar (PCV13) vaccine recommended followed by Pneumovax (PPSV23) vaccine  If already received PPSV23 since turning 65, then PCV13 recommended at least one year after PPSV23 dose  Hepatitis B Vaccine 3 dose series if at intermediate or high risk (ex: diabetes, end stage renal disease, liver disease)   Tetanus (Td) Vaccine - COST NOT COVERED BY MEDICARE PART B Following completion of primary series, a booster dose should be given every 10 years to maintain immunity against tetanus  Td may also be given as tetanus wound prophylaxis  Tdap Vaccine - COST NOT COVERED BY MEDICARE PART B Recommended at least once for all adults  For pregnant patients, recommended with each pregnancy  Shingles Vaccine (Shingrix) - COST NOT COVERED BY MEDICARE PART B  2 shot series recommended in those aged 48 and above     Health Maintenance Due:      Topic Date Due    Breast Cancer Screening: Mammogram  06/04/2021    Cervical Cancer Screening  08/10/2024    Colorectal Cancer Screening  07/25/2027    HIV Screening  Completed    Hepatitis C Screening  Completed     Immunizations Due:      Topic Date Due    Influenza Vaccine (1) 09/01/2021     Advance Directives   What are advance directives? Advance directives are legal documents that state your wishes and plans for medical care  These plans are made ahead of time in case you lose your ability to make decisions for yourself  Advance directives can apply to any medical decision, such as the treatments you want, and if you want to donate organs     What are the types of advance directives? There are many types of advance directives, and each state has rules about how to use them  You may choose a combination of any of the following:  · Living will: This is a written record of the treatment you want  You can also choose which treatments you do not want, which to limit, and which to stop at a certain time  This includes surgery, medicine, IV fluid, and tube feedings  · Durable power of  for healthcare [facility]): This is a written record that states who you want to make healthcare choices for you when you are unable to make them for yourself  This person, called a proxy, is usually a family member or a friend  You may choose more than 1 proxy  · Do not resuscitate (DNR) order:  A DNR order is used in case your heart stops beating or you stop breathing  It is a request not to have certain forms of treatment, such as CPR  A DNR order may be included in other types of advance directives  · Medical directive: This covers the care that you want if you are in a coma, near death, or unable to make decisions for yourself  You can list the treatments you want for each condition  Treatment may include pain medicine, surgery, blood transfusions, dialysis, IV or tube feedings, and a ventilator (breathing machine)  · Values history: This document has questions about your views, beliefs, and how you feel and think about life  This information can help others choose the care that you would choose  Why are advance directives important? An advance directive helps you control your care  Although spoken wishes may be used, it is better to have your wishes written down  Spoken wishes can be misunderstood, or not followed  Treatments may be given even if you do not want them  An advance directive may make it easier for your family to make difficult choices about your care  Urinary Incontinence   Urinary incontinence (UI)  is when you lose control of your bladder   UI develops because your bladder cannot store or empty urine properly  The 3 most common types of UI are stress incontinence, urge incontinence, or both  Medicines:   · May be given to help strengthen your bladder control  Report any side effects of medication to your healthcare provider  Do pelvic muscle exercises often:  Your pelvic muscles help you stop urinating  Squeeze these muscles tight for 5 seconds, then relax for 5 seconds  Gradually work up to squeezing for 10 seconds  Do 3 sets of 15 repetitions a day, or as directed  This will help strengthen your pelvic muscles and improve bladder control  Train your bladder:  Go to the bathroom at set times, such as every 2 hours, even if you do not feel the urge to go  You can also try to hold your urine when you feel the urge to go  For example, hold your urine for 5 minutes when you feel the urge to go  As that becomes easier, hold your urine for 10 minutes  Self-care:   · Keep a UI record  Write down how often you leak urine and how much you leak  Make a note of what you were doing when you leaked urine  · Drink liquids as directed  You may need to limit the amount of liquid you drink to help control your urine leakage  Do not drink any liquid right before you go to bed  Limit or do not have drinks that contain caffeine or alcohol  · Prevent constipation  Eat a variety of high-fiber foods  Good examples are high-fiber cereals, beans, vegetables, and whole-grain breads  Walking is the best way to trigger your intestines to have a bowel movement  · Exercise regularly and maintain a healthy weight  Weight loss and exercise will decrease pressure on your bladder and help you control your leakage  · Use a catheter as directed  to help empty your bladder  A catheter is a tiny, plastic tube that is put into your bladder to drain your urine  · Go to behavior therapy as directed  Behavior therapy may be used to help you learn to control your urge to urinate         © Copyright [employer] 2018 Information is for [employer] use only and may not be sold, redistributed or otherwise used for commercial purposes  All illustrations and images included in CareNotes® are the copyrighted property of [employer]  or [facility] Preventive Visit Patient Instructions  Thank you for completing your Welcome to Medicare Visit or Medicare Annual Wellness Visit today  Your next wellness visit will be due in one year (9/15/2022)  The screening/preventive services that you may require over the next 5-10 years are detailed below  Some tests may not apply to you based off risk factors and/or age  Screening tests ordered at today's visit but not completed yet may show as past due  Also, please note that scanned in results may not display below  Preventive Screenings:  Service Recommendations Previous Testing/Comments   Colorectal Cancer Screening  * Colonoscopy    * Fecal Occult Blood Test (FOBT)/Fecal Immunochemical Test (FIT)  * Fecal DNA/Cologuard Test  * Flexible Sigmoidoscopy Age: 54-65 years old   Colonoscopy: every 10 years (may be performed more frequently if at higher risk)  OR  FOBT/FIT: every 1 year  OR  Cologuard: every 3 years  OR  Sigmoidoscopy: every 5 years  Screening may be recommended earlier than age 48 if at higher risk for colorectal cancer  Also, an individualized decision between you and your healthcare provider will decide whether screening between the ages of 74-80 would be appropriate  Colonoscopy: 07/25/2017  FOBT/FIT: 09/08/2020  Cologuard: Not on file  Sigmoidoscopy: Not on file    Screening Current     Breast Cancer Screening Age: 36 years old  Frequency: every 1-2 years  Not required if history of left and right mastectomy Mammogram: 06/04/2019        Cervical Cancer Screening Between the ages of 21-29, pap smear recommended once every 3 years  Between the ages of 33-67, can perform pap smear with HPV co-testing every 5 years     Recommendations may differ for women with a history of total hysterectomy, cervical cancer, or abnormal pap smears in past  Pap Smear: 08/10/2021    Screening Current   Hepatitis C Screening Once for adults born between 1945 and 1965  More frequently in patients at high risk for Hepatitis C Hep C Antibody: 07/13/2021    Screening Current   Diabetes Screening 1-2 times per year if you're at risk for diabetes or have pre-diabetes Fasting glucose: 95 mg/dL   A1C: 6 8 %    Screening Not Indicated  History Diabetes   Cholesterol Screening Once every 5 years if you don't have a lipid disorder  May order more often based on risk factors  Lipid panel: 04/08/2021    Screening Not Indicated  History Lipid Disorder     Other Preventive Screenings Covered by Medicare:  6  Abdominal Aortic Aneurysm (AAA) Screening: covered once if your at risk  You're considered to be at risk if you have a family history of AAA  7  Lung Cancer Screening: covers low dose CT scan once per year if you meet all of the following conditions: (1) Age 50-69; (2) No signs or symptoms of lung cancer; (3) Current smoker or have quit smoking within the last 15 years; (4) You have a tobacco smoking history of at least 30 pack years (packs per day multiplied by number of years you smoked); (5) You get a written order from a healthcare provider  8  Glaucoma Screening: covered annually if you're considered high risk: (1) You have diabetes OR (2) Family history of glaucoma OR (3)  aged 48 and older OR (3)  American aged 72 and older  5  Osteoporosis Screening: covered every 2 years if you meet one of the following conditions: (1) You're estrogen deficient and at risk for osteoporosis based off medical history and other findings; (2) Have a vertebral abnormality; (3) On glucocorticoid therapy for more than 3 months; (4) Have primary hyperparathyroidism; (5) On osteoporosis medications and need to assess response to drug therapy     · Last bone density test (DXA Scan): 03/04/2020  10  HIV Screening: covered annually if you're between the age of 12-76  Also covered annually if you are younger than 13 and older than 72 with risk factors for HIV infection  For pregnant patients, it is covered up to 3 times per pregnancy  Immunizations:  Immunization Recommendations   Influenza Vaccine Annual influenza vaccination during flu season is recommended for all persons aged >= 6 months who do not have contraindications   Pneumococcal Vaccine (Prevnar and Pneumovax)  * Prevnar = PCV13  * Pneumovax = PPSV23   Adults 25-60 years old: 1-3 doses may be recommended based on certain risk factors  Adults 72 years old: Prevnar (PCV13) vaccine recommended followed by Pneumovax (PPSV23) vaccine  If already received PPSV23 since turning 65, then PCV13 recommended at least one year after PPSV23 dose  Hepatitis B Vaccine 3 dose series if at intermediate or high risk (ex: diabetes, end stage renal disease, liver disease)   Tetanus (Td) Vaccine - COST NOT COVERED BY MEDICARE PART B Following completion of primary series, a booster dose should be given every 10 years to maintain immunity against tetanus  Td may also be given as tetanus wound prophylaxis  Tdap Vaccine - COST NOT COVERED BY MEDICARE PART B Recommended at least once for all adults  For pregnant patients, recommended with each pregnancy  Shingles Vaccine (Shingrix) - COST NOT COVERED BY MEDICARE PART B  2 shot series recommended in those aged 48 and above     Health Maintenance Due:      Topic Date Due    Breast Cancer Screening: Mammogram  06/04/2021    Cervical Cancer Screening  08/10/2024    Colorectal Cancer Screening  07/25/2027    HIV Screening  Completed    Hepatitis C Screening  Completed     Immunizations Due:      Topic Date Due    Influenza Vaccine (1) 09/01/2021     Advance Directives   What are advance directives? Advance directives are legal documents that state your wishes and plans for medical care   These plans are made ahead of time in case you lose your ability to make decisions for yourself  Advance directives can apply to any medical decision, such as the treatments you want, and if you want to donate organs  What are the types of advance directives? There are many types of advance directives, and each state has rules about how to use them  You may choose a combination of any of the following:  · Living will: This is a written record of the treatment you want  You can also choose which treatments you do not want, which to limit, and which to stop at a certain time  This includes surgery, medicine, IV fluid, and tube feedings  · Durable power of  for healthcare [facility]): This is a written record that states who you want to make healthcare choices for you when you are unable to make them for yourself  This person, called a proxy, is usually a family member or a friend  You may choose more than 1 proxy  · Do not resuscitate (DNR) order:  A DNR order is used in case your heart stops beating or you stop breathing  It is a request not to have certain forms of treatment, such as CPR  A DNR order may be included in other types of advance directives  · Medical directive: This covers the care that you want if you are in a coma, near death, or unable to make decisions for yourself  You can list the treatments you want for each condition  Treatment may include pain medicine, surgery, blood transfusions, dialysis, IV or tube feedings, and a ventilator (breathing machine)  · Values history: This document has questions about your views, beliefs, and how you feel and think about life  This information can help others choose the care that you would choose  Why are advance directives important? An advance directive helps you control your care  Although spoken wishes may be used, it is better to have your wishes written down  Spoken wishes can be misunderstood, or not followed   Treatments may be given even if you do not want them  An advance directive may make it easier for your family to make difficult choices about your care  Urinary Incontinence   Urinary incontinence (UI)  is when you lose control of your bladder  UI develops because your bladder cannot store or empty urine properly  The 3 most common types of UI are stress incontinence, urge incontinence, or both  Medicines:   · May be given to help strengthen your bladder control  Report any side effects of medication to your healthcare provider  Do pelvic muscle exercises often:  Your pelvic muscles help you stop urinating  Squeeze these muscles tight for 5 seconds, then relax for 5 seconds  Gradually work up to squeezing for 10 seconds  Do 3 sets of 15 repetitions a day, or as directed  This will help strengthen your pelvic muscles and improve bladder control  Train your bladder:  Go to the bathroom at set times, such as every 2 hours, even if you do not feel the urge to go  You can also try to hold your urine when you feel the urge to go  For example, hold your urine for 5 minutes when you feel the urge to go  As that becomes easier, hold your urine for 10 minutes  Self-care:   · Keep a UI record  Write down how often you leak urine and how much you leak  Make a note of what you were doing when you leaked urine  · Drink liquids as directed  You may need to limit the amount of liquid you drink to help control your urine leakage  Do not drink any liquid right before you go to bed  Limit or do not have drinks that contain caffeine or alcohol  · Prevent constipation  Eat a variety of high-fiber foods  Good examples are high-fiber cereals, beans, vegetables, and whole-grain breads  Walking is the best way to trigger your intestines to have a bowel movement  · Exercise regularly and maintain a healthy weight  Weight loss and exercise will decrease pressure on your bladder and help you control your leakage     · Use a catheter as directed  to help empty your bladder  A catheter is a tiny, plastic tube that is put into your bladder to drain your urine  · Go to behavior therapy as directed  Behavior therapy may be used to help you learn to control your urge to urinate  © Copyright [facility] 2018 Information is for End User's use only and may not be sold, redistributed or otherwise used for commercial purposes   All illustrations and images included in CareNotes® are the copyrighted property of [employer]  or [address] [place] [address]

## 2021-09-14 NOTE — TELEPHONE ENCOUNTER
Caregiver Lisa cota, pt was here for appt with Dr Jayleen Koch today and received order for PT but needs to be changed to PT through home health since pt can not go to a facililty  They would like it for [employer]   Can also fax to [place] [phone]

## 2021-09-14 NOTE — ASSESSMENT & PLAN NOTE
-Stable, no recent episodes of aspiration  -Repeat video barium swallow test 10/29 showed moderate oral/ pharyngeal dsyphagia with penetration noted with NTL, improvement in swallowing of honey thick liquids by tsp   Aspiration risk noted with NTL by tsp and straw due to observed penetration to VC    - Recommendation is to continue the patient on pureed diet and honey thick liquid by tsp,  -continue meds crushed in puree  -Continue Omeprazole 20mg BID  - continue aspiration precautions

## 2021-09-14 NOTE — PROGRESS NOTES
Assessment/Plan:    Type 2 diabetes mellitus, without long-term current use of insulin ([facility])    Lab Results   Component Value Date    HGBA1C 6 8 (H) 08/19/2021     Imroving  - Last A1C  6 8 improved from 7 8% in 05/2021   - Medications: Metformin 1000 mg BID and Linagliptin 5mg q daily  - Encouraged a healthy diet and exercise  Recheck A1C in 3 months  Advised to check blood glucose weekly        Dysphagia  -Stable, no recent episodes of aspiration  -Repeat video barium swallow test 10/29 showed moderate oral/ pharyngeal dsyphagia with penetration noted with NTL, improvement in swallowing of honey thick liquids by tsp  Aspiration risk noted with NTL by tsp and straw due to observed penetration to VC    - Recommendation is to continue the patient on pureed diet and honey thick liquid by tsp,  -continue meds crushed in puree  -Continue Omeprazole 20mg BID  - continue aspiration precautions       Diagnoses and all orders for this visit:    Medicare annual wellness visit, subsequent  -     Microalbumin / creatinine urine ratio    Type 2 diabetes mellitus with hyperosmolarity without coma, without long-term current use of insulin ([facility])  -     Basic metabolic panel; Future  -     HEMOGLOBIN A1C W/ EAG ESTIMATION; Future    Ambulatory dysfunction  -     Ambulatory referral to Physical Therapy; Future    Dysphagia, unspecified type          Subjective:      Patient ID: Kike Fermin is a 63 y o  female  HPI  Patient is a 62 yo female with hx of Cerebral Palsy, Type 2DM, Sigmoid Volvulus and Dysphagia who presents for medicare annual wellness and follow up for chronic medical conditions  She is accompanied on the visit with John Henderson, her nurse from [employer] [facility] [employer]  Per John Henderson, patient was just discharged from the hospital on 8/26 after presenting with concerns for UTI and developing a change in functional status  She was treated empirically with IV ceftriaxone  Urine culture showed no growth   Patient also developed hyernatremia of 147 and her IV fluids was changed  In addition patient developed soft Bps during admission that improved with IV hydration  Since discharge patient is starting to pull herself up by herself and is starting to get to baseline  Patient is eating 100% of meals, taking adequate hydration, and is having daily BM  No issues with urination  She has an appointment with Neurology on Oct 18th  The following portions of the patient's history were reviewed and updated as appropriate: allergies, current medications, past family history, past medical history, past social history, past surgical history and problem list     Review of Systems   Constitutional: Negative for appetite change, chills, fatigue and fever  HENT: Positive for trouble swallowing (chronic, improving)  Negative for congestion and rhinorrhea  Eyes: Negative for visual disturbance  Respiratory: Negative for cough, shortness of breath and wheezing  Cardiovascular: Negative for chest pain, palpitations and leg swelling  Gastrointestinal: Negative for constipation, diarrhea, nausea and vomiting  Genitourinary: Negative for difficulty urinating and hematuria  Musculoskeletal: Positive for gait problem (chronic)  Skin: Negative for rash and wound  Neurological: Positive for weakness  Negative for syncope  Psychiatric/Behavioral: Negative for agitation  Objective:      /72 (BP Location: Left arm, Patient Position: Sitting, Cuff Size: Standard)   Pulse 60   Temp 97 6 °F (36 4 °C) (Temporal)   Resp 12   Ht 4' 10" (1 473 m)   Wt 47 4 kg (104 lb 9 6 oz)   LMP 11/29/2009 (Exact Date)   BMI 21 86 kg/m²          Physical Exam  Vitals reviewed  Constitutional:       General: She is not in acute distress  Appearance: She is not ill-appearing  HENT:      Head: Normocephalic  Mouth/Throat:      Mouth: Mucous membranes are moist    Cardiovascular:      Rate and Rhythm: Normal rate and regular rhythm  Pulmonary:      Effort: Pulmonary effort is normal       Breath sounds: Normal breath sounds  No wheezing or rales  Abdominal:      General: Abdomen is flat  Bowel sounds are normal       Palpations: Abdomen is soft  Musculoskeletal:         General: No tenderness  Cervical back: Neck supple  Right lower leg: No edema  Left lower leg: No edema  Skin:     General: Skin is warm  Findings: No erythema or rash  Neurological:      Mental Status: She is alert

## 2021-09-14 NOTE — PROGRESS NOTES
Assessment and Plan:     Problem List Items Addressed This Visit     None      Visit Diagnoses     Medicare annual wellness visit, subsequent    -  Primary           Preventive health issues were discussed with patient, and age appropriate screening tests were ordered as noted in patient's After Visit Summary  Personalized health advice and appropriate referrals for health education or preventive services given if needed, as noted in patient's After Visit Summary  History of Present Illness:     Patient presents for Medicare Annual Wellness visit  P  -took about one week to get back to baseline, she is able to lift herself  - more progression of her CP  - appointment with Neurology on Oct 18th       Eating 100% of meals  Adequate hydration  Protein supplements as needed 20x this morning     Urine is better  Constipation: daily     B    *will need to check BG one a week      Patient Care Team:  Jomar Parsons DO as PCP - General (Family Medicine)  Erwin Grover MD Hart Lung, MD Trecia Clement, MD as Endoscopist     Problem List:     Patient Active Problem List   Diagnosis    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    Hyperlipidemia    Gastroesophageal reflux disease    Cerebral palsy ([facility]    Spasticity    Ambulatory dysfunction    Bipolar disorder ([facility]    Severe Intellectual disability    Type 2 diabetes mellitus, without long-term current use of insulin ([facility])    Abnormal albumin    Iron deficiency anemia    Toxic metabolic encephalopathy    Anxiety    Atopic dermatitis    Chondrodermatitis nodularis helicis of left ear    Constipation    Dry eye    Gait abnormality    Hypotension    Hypothyroidism    Menopause    Osteoporosis    Other chronic pain    Peripheral neuropathy    Physical deconditioning    Resting tremor    Seasonal allergies    Urinary incontinence    Osteoarthritis of both hips    Gait disturbance    Severe sepsis ([facility]    Hypernatremia    Breast asymmetry    Abdominal bloating    History of fall    History of vitamin D deficiency    Acute cystitis without hematuria    Decreased appetite    Dysphagia    Underweight    Eosinophilic leukocytosis    Macrocytic anemia    Exposure to COVID-19 virus    Abnormal finding on lung imaging    Herpes zoster without complication      Past Medical and Surgical History:     Past Medical History:   Diagnosis Date    Adjustment disorder     Altered mental status 12/11/2015    Anemia     Bipolar 1 disorder (HCC)     Cerebral palsy (HCC)     Chronic hypernatremia 2/6/2016    Closed fracture of left hip ([facility] [address] ) 1/19/2016    Closed left hip fracture (HCC)     no surgery    Constipation     Dehydration 2/20/2016    Diabetes mellitus ([facility] )     Disease of thyroid gland     Diverticulosis     Fracture of multiple ribs of right side     Hip fracture ([facility] ) 07/26/2015    left    Hyperlipidemia     Hypernatremia 12/28/2018    Hypotension     Impulse control disorder     Incontinence     Intellectual disability due to developmental disorder, unspecified     Microalbuminuria     Osteopathia     Osteoporosis     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[facility] )     Thrombocytopenia ([facility] ) 7/31/2015     Past Surgical History:   Procedure Laterality Date    ABDOMINAL SURGERY      COLECTOMY MIN      re-anastomosis 7/22/16    COLONOSCOPY N/A 7/21/2016    Procedure: COLONOSCOPY;  Surgeon: Oswald Vogel MD;  [facility]; Service:     COLONOSCOPY N/A 1/31/2016    Procedure: COLONOSCOPY;  Surgeon: Oswald Vogel MD;  [facility];  Service:     COLONOSCOPY N/A 7/25/2017    Procedure: COLONOSCOPY;  Surgeon: Oswald Vogel MD;  [facility];   Service: Colorectal    COLOSTOMY      EXPLORATORY LAPAROTOMY W/ BOWEL RESECTION N/A 1/31/2016    Procedure: exploratory laparotomy, left sigmoidectomy, coloproctostomy, take down splenic flexure, loop colostomy;  Surgeon: Ness Sultana MD Naa;  [facility];  Service:     MT CLOSE ENTEROSTOMY N/A 7/22/2016    Procedure: SEGMENTAL COLECTOMY WITH COLOCOLOSTOMY;  Surgeon: Carri Valiente MD;  [facility];  Service: Colorectal    UPPER GASTROINTESTINAL ENDOSCOPY        Family History:     Family History   Problem Relation Age of Onset    Alcohol abuse Mother     Alcohol abuse Father     Diabetes Sister     No Known Problems Sister     No Known Problems Sister       Social History:     Social History     Socioeconomic History    Marital status: Single     Spouse name: None    Number of children: None    Years of education: None    Highest education level: None   Occupational History    None   Tobacco Use    Smoking status: Never Smoker    Smokeless tobacco: Never Used   Vaping Use    Vaping Use: Never used   Substance and Sexual Activity    Alcohol use: Not Currently    Drug use: No    Sexual activity: Never   Other Topics Concern    None   Social History Narrative    Lives in a group home      Social Determinants of Health     Financial Resource Strain: Low Risk     Difficulty of Paying Living Expenses: Not very hard   Food Insecurity: No Food Insecurity    Worried About Running Out of Food in the Last Year: Never true    Simran of Food in the Last Year: Never true   Transportation Needs: No Transportation Needs    Lack of Transportation (Medical): No    Lack of Transportation (Non-Medical):  No   Physical Activity:     Days of Exercise per Week:     Minutes of Exercise per Session:    Stress:     Feeling of Stress :    Social Connections:     Frequency of Communication with Friends and Family:     Frequency of Social Gatherings with Friends and Family:     Attends Hindu Services:     Active Member of Clubs or Organizations:     Attends Club or Organization Meetings:     Marital Status:    Intimate Partner Violence:     Fear of Current or Ex-Partner:     Emotionally Abused:     Physically Abused:     Sexually Abused:       Medications and Allergies:     Current Outpatient Medications   Medication Sig Dispense Refill    acetaminophen (TYLENOL) 325 mg tablet TAKE 2 TABLETS BY MOUTH (650MG) EVERY 6 HOURS AS NEEDED FOR PAIN / FEVER GREATER THAN 100 4 10 tablet 11    ARIPiprazole (ABILIFY) 30 mg tablet Take 1 tablet (30 mg) by mouth daily at 8 pm  0    baclofen 10 mg tablet Take 1 tablet (10 mg total) by mouth 3 (three) times a day 90 tablet 11    benzonatate (TESSALON PERLES) 100 mg capsule Take 1 capsule (100 mg total) by mouth every 8 (eight) hours as needed for cough 30 capsule 0    calcium carbonate (OS-MORRIS) 600 MG tablet Take 1 tablet (600 mg total) by mouth 2 (two) times a day with meals 60 tablet 2    carbamide peroxide (DEBROX) 6 5 % otic solution Administer 2 drops into both ears daily as needed 2 drops in the affected ear prn x 5 days      carboxymethylcellulose (REFRESH TEARS) 0 5 % SOLN 1 drop 3 (three) times a day as needed for dry eyes      Cholecalciferol (Vitamin D) 50 MCG (2000 UT) tablet Take 1 tablet (2,000 Units total) by mouth daily 30 tablet 5    Citalopram Hydrobromide (CELEXA PO) Take 40 mg by mouth every morning        docosanol (ABREVA) 10 % Apply topically 5 (five) times a day Apply and rub in 5x a day until healed as needed for lesion  0    ferrous sulfate 324 (65 Fe) mg Take 325 mg by mouth 2 (two) times a day        fexofenadine (ALLEGRA) 180 MG tablet Take 180 mg by mouth as needed        fluticasone (FLONASE) 50 mcg/act nasal spray 1 spray into each nostril daily as needed for rhinitis or allergies 1 Bottle 1    guaiFENesin (ROBITUSSIN) 100 MG/5ML oral liquid Take 200 mg by mouth every 8 (eight) hours as needed 2 tsp every 8 hours as needed for cough      hydrocortisone 1 % cream Apply topically to affected area twice daily as needed for rash 30 g 0    levothyroxine (SYNTHROID) 25 mcg tablet Take 1 tablet (25 mcg) by mouth every morning  0    linaGLIPtin 5 MG TABS Take 5 mg by mouth daily 90 tablet 0    LORazepam (ATIVAN) 0 5 mg tablet Take 0 5 mg by mouth 2 (two) times a day   metFORMIN (GLUCOPHAGE) 1000 MG tablet Take 1 tablet (1,000 mg total) by mouth 2 (two) times a day with meals 60 tablet 2    neomycin-bacitracin-polymyxin b (NEOSPORIN) ointment Apply 1 application topically 2 (two) times a day as needed      nitrofurantoin (MACRODANTIN) 50 mg capsule Take 50 mg by mouth daily at bedtime      olopatadine HCl (PATADAY) 0 2 % opth drops Administer 1 drop to both eyes as needed      Oxybutynin Chloride (DITROPAN XL PO) Take 5 mg by mouth 3 (three) times a day       polyethylene glycol (GLYCOLAX) 17 GM/SCOOP powder DISSOLVE 17GM (1 CAPFUL) IN 8 OZ FLUIDS AND CONSUME BY MOUTH EVERY MORNING *HOLD 1 DAY FOR LOOSE STOOLS* (CONSTIPATION) 510 g 10    simvastatin (ZOCOR) 20 mg tablet Take 1 tablet (20 mg total) by mouth daily with dinner at 4 pm for hyperlipidemia      Starch, Thickening, POWD Take by mouth daily Use on food and liquid as directed 1020 g 3    traZODone (DESYREL) 100 mg tablet Take 1 tablet (100 mg total) by mouth daily at  8 pm for depression  0    Vitamins A & D (VITAMIN A & D) ointment Apply topically as needed for dry skin      denosumab (PROLIA) 60 mg/mL Inject 1 mL (60 mg total) under the skin once for 1 dose 1 mL 1    omeprazole (PriLOSEC) 20 mg delayed release capsule Take 1 capsule (20 mg total) by mouth 2 (two) times a day before meals 60 capsule 2    valACYclovir (VALTREX) 1,000 mg tablet Take 1 tablet (1,000 mg total) by mouth 3 (three) times a day for 7 days 21 tablet 0    valproic acid (DEPAKENE) 250 MG/5ML soln Take 10 mL (500 mg total) by mouth 2 (two) times a day 600 mL 1     No current facility-administered medications for this visit       No Known Allergies   Immunizations:     Immunization History   Administered Date(s) Administered    H1N1, All Formulations 04/07/2010    Hep B, adult 01/16/2015, 03/20/2015, 08/24/2015    Influenza Quadrivalent Preservative Free 3 years and older IM 11/17/2014, 11/09/2015, 10/28/2016    Influenza Quadrivalent, 6-35 Months IM 10/26/2017    Influenza, injectable, quadrivalent, preservative free 0 5 mL 11/07/2018    Influenza, recombinant, quadrivalent,injectable, preservative free 10/28/2019, 09/25/2020    Influenza, seasonal, injectable 10/08/2013    Pneumococcal Polysaccharide PPV23 11/04/2008, 01/23/2018    SARS-CoV-2 / COVID-19 mRNA IM (Pfizer-BioNTech) 01/22/2021, 02/12/2021    Tdap 11/04/2008, 06/06/2018    Tuberculin Skin Test-PPD Intradermal 04/11/2016, 01/23/2018, 11/26/2019, 06/25/2021      Health Maintenance:         Topic Date Due    Breast Cancer Screening: Mammogram  06/04/2021    Cervical Cancer Screening  08/10/2024    Colorectal Cancer Screening  07/25/2027    HIV Screening  Completed    Hepatitis C Screening  Completed         Topic Date Due    Influenza Vaccine (1) 09/01/2021      Medicare Health Risk Assessment:     /72 (BP Location: Left arm, Patient Position: Sitting, Cuff Size: Standard)   Pulse 60   Temp 97 6 °F (36 4 °C) (Temporal)   Resp 12   Ht 4' 10" (1 473 m)   Wt 47 4 kg (104 lb 9 6 oz)   LMP 11/29/2009 (Exact Date)   BMI 21 86 kg/m²          Health Risk Assessment:   Patient rates overall health as fair  Patient feels that their physical health rating is slightly worse  Patient is satisfied with their life  Eyesight was rated as same  Hearing was rated as same  Patient feels that their emotional and mental health rating is same  Patients states they are sometimes angry  Patient states they are sometimes unusually tired/fatigued  Pain experienced in the last 7 days has been some  Patient's pain rating has been 1/10  Patient states that she has experienced no weight loss or gain in last 6 months  Fall Risk Screening:    In the past year, patient has experienced: history of falling in past year      Urinary Incontinence Screening:   Patient has leaked urine accidently in the last six months  Incontinent    Home Safety:  Patient has trouble with stairs inside or outside of their home  Patient has working smoke alarms and has working carbon monoxide detector  Home safety hazards include: none  Lives in [id number] [facility] with ramping, [facility]    Nutrition:   Current diet is Diabetic and Other (please comment)  1500 calorie Puree Diet with Honey thick liquids    Medications:   Patient is currently taking over-the-counter supplements  OTC medications include: all prescribed!   Patient is not able to manage medications  Staff provide medication    Activities of Daily Living (ADLs)/Instrumental Activities of Daily Living (IADLs):   Walk and transfer into and out of bed and chair?: No  Dress and groom yourself?: No    Bathe or shower yourself?: No    Feed yourself? No  Do your laundry/housekeeping?: No  Manage your money, pay your bills and track your expenses?: No  Make your own meals?: No    Do your own shopping?: No    ADL comments: Lives in ICF/IDD  Receives full care  [employer] Medical Equipment [employer]  [employer]    Previous Hospitalizations:   Any hospitalizations or ED visits within the last 12 months?: Yes    How many hospitalizations have you had in the last year?: 1-2    Advance Care Planning:   Living will: No    Durable POA for healthcare:  Yes    Advanced directive: No      PREVENTIVE SCREENINGS      Cardiovascular Screening:    General: Screening Not Indicated and History Lipid Disorder      Diabetes Screening:     General: Screening Not Indicated and History Diabetes      Colorectal Cancer Screening:     General: Screening Current      Cervical Cancer Screening:    General: Screening Current      Osteoporosis Screening:    General: Screening Not Indicated and History Osteoporosis      Lung Cancer Screening:     General: Screening Not Indicated      Hepatitis C Screening:    General: Screening Current    Screening, Brief Intervention, and Referral to Treatment (SBIRT)    Screening  Typical number of drinks in a day: 0  Typical number of drinks in a week: 0  Interpretation: Low risk drinking behavior  AUDIT-C Screenin) How often did you have a drink containing alcohol in the past year? never  2) How many drinks did you have on a typical day when you were drinking in the past year? never  3) How often did you have 6 or more drinks on one occasion in the past year? never    AUDIT-C Score: 0  Interpretation: Score 0-2 (female): Negative screen for alcohol misuse    Single Item Drug Screening:  How often have you used an illegal drug (including marijuana) or a prescription medication for non-medical reasons in the past year? never    Single Item Drug Screen Score: 0  Interpretation: Negative screen for possible drug use disorder    Other Counseling Topics:   Sunscreen         Lizy Mcdaniel, DO

## 2021-09-14 NOTE — ASSESSMENT & PLAN NOTE
Lab Results   Component Value Date    HGBA1C 6 8 (H) 08/19/2021     Imroving  - Last A1C  6 8 improved from 7 8% in 05/2021   - Medications: Metformin 1000 mg BID and Linagliptin 5mg q daily  - Encouraged a healthy diet and exercise  Recheck A1C in 3 months  Advised to check blood glucose weekly

## 2021-09-14 NOTE — PROGRESS NOTES
Assessment and Plan:     Problem List Items Addressed This Visit     None           Preventive health issues were discussed with patient, and age appropriate screening tests were ordered as noted in patient's After Visit Summary  Personalized health advice and appropriate referrals for health education or preventive services given if needed, as noted in patient's After Visit Summary  History of Present Illness:     Patient presents for Welcome to Medicare visit  Patient is a 64 yo female with hx of Cerebral Palsy, Type 2DM, Sigmoid Volvulus and Dysphagia who presents for medicare annual wellness and follow up for chronic medical conditions  She is accompanied on the visit with Wendy Vieira, her nurse from [facility] [employer]  Per Wendy Vieira, patient was just discharged from the hospital on 8/26 after presenting with concerns for UTI and developing a change in functional status  She was treated empirically with IV ceftriaxone  Urine culture showed no growth  Patient also developed hyernatremia of 147 and her IV fluids was changed  In addition patient developed soft Bps during admission that improved with IV hydration  Since discharge patient is starting to pull herself up by herself and is starting to get to baseline  Patient is eating 100% of meals, taking adequate hydration, and is having daily BM  No issues with urination  She has an appointment with Neurology on Oct 18th  Patient Care Team:  Austin Logan DO as PCP - General (Family Medicine)  MD Melani Molina MD Salvador Hunter, MD as Endoscopist     Review of Systems:     Review of Systems   Constitutional: Positive for fatigue  Negative for appetite change, chills and fever  HENT: Negative for congestion and trouble swallowing (chronic dysphagia, improving )  Respiratory: Negative for cough and shortness of breath  Cardiovascular: Negative for leg swelling     Gastrointestinal: Positive for constipation  Negative for diarrhea, nausea and vomiting  Genitourinary: Negative for difficulty urinating and hematuria  Musculoskeletal: Positive for gait problem ( chronic)  Skin: Negative for rash and wound  Neurological: Negative for syncope  Psychiatric/Behavioral: Negative for agitation        Problem List:     Patient Active Problem List   Diagnosis    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[facility] )    Hyperlipidemia    Gastroesophageal reflux disease    [facility]    Spasticity    Ambulatory dysfunction    Bipolar disorder (HCC)    Severe Intellectual disability    Type 2 diabetes mellitus, without long-term current use of insulin ([facility])    Abnormal albumin    Iron deficiency anemia    Toxic metabolic encephalopathy    Anxiety    Atopic dermatitis    Chondrodermatitis nodularis helicis of left ear    Constipation    Dry eye    Gait abnormality    Hypotension    Hypothyroidism    Menopause    Osteoporosis    Other chronic pain    Peripheral neuropathy    Physical deconditioning    Resting tremor    Seasonal allergies    Urinary incontinence    Osteoarthritis of both hips    Gait disturbance    Severe sepsis ([facility])    Hypernatremia    Breast asymmetry    Abdominal bloating    History of fall    History of vitamin D deficiency    Acute cystitis without hematuria    Decreased appetite    Dysphagia    Underweight    Eosinophilic leukocytosis    Macrocytic anemia    Exposure to COVID-19 virus    Abnormal finding on lung imaging    Herpes zoster without complication      Past Medical and Surgical History:     Past Medical History:   Diagnosis Date    Adjustment disorder     Altered mental status 12/11/2015    Anemia     Bipolar 1 disorder (HCC)     Cerebral palsy (HCC)     Chronic hypernatremia 2/6/2016    Closed fracture of left hip ([facility] ) 1/19/2016    Closed left hip fracture ([facility])     no surgery    Constipation     Dehydration 2/20/2016    Diabetes mellitus ([facility]  Disease of thyroid gland     Diverticulosis     Fracture of multiple ribs of right side     Hip fracture (HCC) 07/26/2015    left    Hyperlipidemia     Hypernatremia 12/28/2018    Hypotension     Impulse control disorder     Incontinence     Intellectual disability due to developmental disorder, unspecified     Microalbuminuria     Osteopathia     Osteoporosis     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HCC)     Thrombocytopenia ([facility] ) 7/31/2015     Past Surgical History:   Procedure Laterality Date    ABDOMINAL SURGERY      COLECTOMY MIN      re-anastomosis 7/22/16    COLONOSCOPY N/A 7/21/2016    Procedure: COLONOSCOPY;  Surgeon: Oswald Vogel MD;  [facility]; Service:     COLONOSCOPY N/A 1/31/2016    Procedure: COLONOSCOPY;  Surgeon: Oswald Vogel MD;  [facility];  Service:     COLONOSCOPY N/A 7/25/2017    Procedure: COLONOSCOPY;  Surgeon: Oswald Vogel MD;  [facility];   Service: Colorectal    COLOSTOMY      EXPLORATORY LAPAROTOMY W/ BOWEL RESECTION N/A 1/31/2016    Procedure: exploratory laparotomy, left sigmoidectomy, coloproctostomy, take down splenic flexure, loop colostomy;  Surgeon: Oswald Vogel MD;  [facility];  Service:     [place] CLOSE ENTEROSTOMY N/A 7/22/2016    Procedure: SEGMENTAL COLECTOMY WITH COLOCOLOSTOMY;  Surgeon: Oswald Vogel MD;  [facility];  Service: Colorectal    UPPER GASTROINTESTINAL ENDOSCOPY        Family History:     Family History   Problem Relation Age of Onset    Alcohol abuse Mother     Alcohol abuse Father     Diabetes Sister     No Known Problems Sister     No Known Problems Sister       Social History:     Social History     Socioeconomic History    Marital status: Single     Spouse name: Not on file    Number of children: Not on file    Years of education: Not on file    Highest education level: Not on file   Occupational History    Not on file   Tobacco Use    Smoking status: Never Smoker  Smokeless tobacco: Never Used   Vaping Use    Vaping Use: Never used   Substance and Sexual Activity    Alcohol use: Not Currently    Drug use: No    Sexual activity: Never   Other Topics Concern    Not on file   Social History Narrative    Lives in a group home      Social Determinants of Health     Financial Resource Strain: Low Risk     Difficulty of Paying Living Expenses: Not hard at all   Food Insecurity: No Food Insecurity    Worried About Running Out of Food in the Last Year: Never true    Simran of Food in the Last Year: Never true   Transportation Needs: No Transportation Needs    Lack of Transportation (Medical): No    Lack of Transportation (Non-Medical):  No   Physical Activity:     Days of Exercise per Week:     Minutes of Exercise per Session:    Stress:     Feeling of Stress :    Social Connections:     Frequency of Communication with Friends and Family:     Frequency of Social Gatherings with Friends and Family:     Attends Hindu Services:     Active Member of Clubs or Organizations:     Attends Club or Organization Meetings:     Marital Status:    Intimate Partner Violence:     Fear of Current or Ex-Partner:     Emotionally Abused:     Physically Abused:     Sexually Abused:       Medications and Allergies:     Current Outpatient Medications   Medication Sig Dispense Refill    acetaminophen (TYLENOL) 325 mg tablet TAKE 2 TABLETS BY MOUTH (650MG) EVERY 6 HOURS AS NEEDED FOR PAIN / FEVER GREATER THAN 100 4 10 tablet 11    ARIPiprazole (ABILIFY) 30 mg tablet Take 1 tablet (30 mg) by mouth daily at 8 pm  0    baclofen 10 mg tablet Take 1 tablet (10 mg total) by mouth 3 (three) times a day 90 tablet 11    benzonatate (TESSALON PERLES) 100 mg capsule Take 1 capsule (100 mg total) by mouth every 8 (eight) hours as needed for cough 30 capsule 0    calcium carbonate (OS-MORRIS) 600 MG tablet Take 1 tablet (600 mg total) by mouth 2 (two) times a day with meals 60 tablet 2    carbamide peroxide (DEBROX) 6 5 % otic solution Administer 2 drops into both ears daily as needed 2 drops in the affected ear prn x 5 days      carboxymethylcellulose (REFRESH TEARS) 0 5 % SOLN 1 drop 3 (three) times a day as needed for dry eyes      Cholecalciferol (Vitamin D) 50 MCG (2000 UT) tablet Take 1 tablet (2,000 Units total) by mouth daily 30 tablet 5    Citalopram Hydrobromide (CELEXA PO) Take 40 mg by mouth every morning        denosumab (PROLIA) 60 mg/mL Inject 1 mL (60 mg total) under the skin once for 1 dose 1 mL 1    docosanol (ABREVA) 10 % Apply topically 5 (five) times a day Apply and rub in 5x a day until healed as needed for lesion  0    ferrous sulfate 324 (65 Fe) mg Take 325 mg by mouth 2 (two) times a day   fexofenadine (ALLEGRA) 180 MG tablet Take 180 mg by mouth as needed        fluticasone (FLONASE) 50 mcg/act nasal spray 1 spray into each nostril daily as needed for rhinitis or allergies 1 Bottle 1    guaiFENesin (ROBITUSSIN) 100 MG/5ML oral liquid Take 200 mg by mouth every 8 (eight) hours as needed 2 tsp every 8 hours as needed for cough      hydrocortisone 1 % cream Apply topically to affected area twice daily as needed for rash 30 g 0    levothyroxine (SYNTHROID) 25 mcg tablet Take 1 tablet (25 mcg) by mouth every morning  0    linaGLIPtin 5 MG TABS Take 5 mg by mouth daily 90 tablet 0    LORazepam (ATIVAN) 0 5 mg tablet Take 0 5 mg by mouth 2 (two) times a day          metFORMIN (GLUCOPHAGE) 1000 MG tablet Take 1 tablet (1,000 mg total) by mouth 2 (two) times a day with meals 60 tablet 2    neomycin-bacitracin-polymyxin b (NEOSPORIN) ointment Apply 1 application topically 2 (two) times a day as needed      nitrofurantoin (MACRODANTIN) 50 mg capsule Take 50 mg by mouth daily at bedtime      olopatadine HCl (PATADAY) 0 2 % opth drops Administer 1 drop to both eyes as needed      omeprazole (PriLOSEC) 20 mg delayed release capsule Take 1 capsule (20 mg total) by  Cervical Cancer Screening  08/10/2024    Colorectal Cancer Screening  07/25/2027    HIV Screening  Completed    Hepatitis C Screening  Completed         Topic Date Due    Influenza Vaccine (1) 09/01/2021      Medicare Screening Tests and Risk Assessments:     Rayne Toure is here for her Subsequent Wellness visit  Last Medicare Wellness visit information reviewed, patient interviewed and updates made to the record today  Health Risk Assessment:   Patient rates overall health as fair  Patient feels that their physical health rating is slightly worse  Patient is satisfied with their life  Eyesight was rated as same  Hearing was rated as same  Patient feels that their emotional and mental health rating is same  Patients states they are sometimes angry  Patient states they are sometimes unusually tired/fatigued  Pain experienced in the last 7 days has been some  Patient's pain rating has been 1/10  Patient states that she has experienced no weight loss or gain in last 6 months  Fall Risk Screening: In the past year, patient has experienced: history of falling in past year      Urinary Incontinence Screening:   Patient has leaked urine accidently in the last six months  Incontinent    Home Safety:  Patient has trouble with stairs inside or outside of their home  Patient has working smoke alarms and has working carbon monoxide detector  Home safety hazards include: none  Lives in [id number] [facility] with ramping, [facility]    Nutrition:   Current diet is Diabetic and Other (please comment)  1500 calorie Puree Diet with Honey thick liquids    Medications:   Patient is currently taking over-the-counter supplements  OTC medications include: all prescribed!   Patient is not able to manage medications   Staff provide medication    Activities of Daily Living (ADLs)/Instrumental Activities of Daily Living (IADLs):   Walk and transfer into and out of bed and chair?: No  Dress and groom yourself?: No    Bathe or shower yourself?: No Feed yourself? No  Do your laundry/housekeeping?: No  Manage your money, pay your bills and track your expenses?: No  Make your own meals?: No    Do your own shopping?: No    ADL comments: Lives in ICF/IDD  Receives full care  [employer]  [employer]    Previous Hospitalizations:   Any hospitalizations or ED visits within the last 12 months?: Yes    How many hospitalizations have you had in the last year?: 1-2    Advance Care Planning:   Living will: No    Durable POA for healthcare: Yes    Advanced directive: No      PREVENTIVE SCREENINGS      Cardiovascular Screening:    General: Screening Not Indicated and History Lipid Disorder      Diabetes Screening:     General: Screening Not Indicated and History Diabetes      Colorectal Cancer Screening:     General: Screening Current      Cervical Cancer Screening:    General: Screening Current      Osteoporosis Screening:    General: Screening Not Indicated and History Osteoporosis      Lung Cancer Screening:     General: Screening Not Indicated      Hepatitis C Screening:    General: Screening Current    Screening, Brief Intervention, and Referral to Treatment (SBIRT)    Screening  Typical number of drinks in a day: 0  Typical number of drinks in a week: 0  Interpretation: Low risk drinking behavior      AUDIT-C Screenin) How often did you have a drink containing alcohol in the past year? never  2) How many drinks did you have on a typical day when you were drinking in the past year? never  3) How often did you have 6 or more drinks on one occasion in the past year? never    AUDIT-C Score: 0  Interpretation: Score 0-2 (female): Negative screen for alcohol misuse    Single Item Drug Screening:  How often have you used an illegal drug (including marijuana) or a prescription medication for non-medical reasons in the past year? never    Single Item Drug Screen Score: 0  Interpretation: Negative screen for possible drug use disorder    No exam data present     Physical Exam:     LMP 11/29/2009 (Exact Date)     Physical Exam  Vitals reviewed  Constitutional:       General: She is not in acute distress  Appearance: She is not ill-appearing  HENT:      Head: Normocephalic  Cardiovascular:      Rate and Rhythm: Normal rate and regular rhythm  Pulses: Normal pulses  Pulmonary:      Effort: Pulmonary effort is normal       Breath sounds: Normal breath sounds  No wheezing or rales  Abdominal:      General: Abdomen is flat  Bowel sounds are normal       Palpations: Abdomen is soft  Musculoskeletal:      Cervical back: Neck supple  Right lower leg: No edema  Left lower leg: No edema  Skin:     General: Skin is warm and dry  Capillary Refill: Capillary refill takes less than 2 seconds  Neurological:      Mental Status: She is alert        Gait: Gait abnormal           Lei Phoenix, DO

## 2021-09-30 ENCOUNTER — TELEPHONE (OUTPATIENT)
Dept: LAB | Facility: HOSPITAL | Age: 61
End: 2021-09-30

## 2021-09-30 ENCOUNTER — TELEPHONE (OUTPATIENT)
Dept: ADMINISTRATIVE | Facility: OTHER | Age: 61
End: 2021-09-30

## 2021-09-30 ENCOUNTER — TELEPHONE (OUTPATIENT)
Dept: FAMILY MEDICINE CLINIC | Facility: CLINIC | Age: 61
End: 2021-09-30

## 2021-09-30 NOTE — TELEPHONE ENCOUNTER
[facility] called to report Cinthia came and did physical therapy eval today and suggested Rosa Ramirez have occupational therapy as well  Please email order to [email or web address]

## 2021-09-30 NOTE — TELEPHONE ENCOUNTER
----- Message from Elda Lazcano RN sent at 9/29/2021  4:09 PM EDT -----  09/29/21 4:09 PM    Hello, our patient Ibis Barragan has had Diabetic Eye Exam completed/performed  Please assist in updating the patient chart by pulling the document from the Media Tab  The date of service is 9/16/2021       Thank you,  Elda Lazcano RN  [facility]

## 2021-09-30 NOTE — TELEPHONE ENCOUNTER
Upon review of the In Basket request we were able to locate, review, and update the patient chart as requested for Diabetic Eye Exam     Any additional questions or concerns should be emailed to the Practice Liaisons via [email or web address] email, please do not reply via In Basket      Thank you  Nader Mccann

## 2021-10-13 ENCOUNTER — APPOINTMENT (OUTPATIENT)
Dept: LAB | Facility: HOSPITAL | Age: 61
End: 2021-10-13
Payer: COMMERCIAL

## 2021-10-13 DIAGNOSIS — E11.00 TYPE 2 DIABETES MELLITUS WITH HYPEROSMOLARITY WITHOUT COMA, WITHOUT LONG-TERM CURRENT USE OF INSULIN (HCC): ICD-10-CM

## 2021-10-13 DIAGNOSIS — M81.0 OSTEOPOROSIS, UNSPECIFIED OSTEOPOROSIS TYPE, UNSPECIFIED PATHOLOGICAL FRACTURE PRESENCE: ICD-10-CM

## 2021-10-13 DIAGNOSIS — F31.4 SEVERE DEPRESSED BIPOLAR I DISORDER WITHOUT PSYCHOTIC FEATURES (HCC): Primary | ICD-10-CM

## 2021-10-13 DIAGNOSIS — Z79.899 ENCOUNTER FOR LONG-TERM (CURRENT) USE OF OTHER MEDICATIONS: ICD-10-CM

## 2021-10-13 LAB
ALBUMIN SERPL BCP-MCNC: 2.8 G/DL (ref 3.5–5)
ALP SERPL-CCNC: 54 U/L (ref 46–116)
ALT SERPL W P-5'-P-CCNC: 12 U/L (ref 12–78)
ANION GAP SERPL CALCULATED.3IONS-SCNC: 3 MMOL/L (ref 4–13)
AST SERPL W P-5'-P-CCNC: 10 U/L (ref 5–45)
BASOPHILS # BLD AUTO: 0.05 THOUSANDS/ΜL (ref 0–0.1)
BASOPHILS NFR BLD AUTO: 1 % (ref 0–1)
BILIRUB SERPL-MCNC: 0.25 MG/DL (ref 0.2–1)
BUN SERPL-MCNC: 13 MG/DL (ref 5–25)
CALCIUM ALBUM COR SERPL-MCNC: 10.8 MG/DL (ref 8.3–10.1)
CALCIUM SERPL-MCNC: 9.8 MG/DL (ref 8.3–10.1)
CHLORIDE SERPL-SCNC: 105 MMOL/L (ref 100–108)
CHOLEST SERPL-MCNC: 186 MG/DL (ref 50–200)
CK SERPL-CCNC: 70 U/L (ref 26–192)
CO2 SERPL-SCNC: 33 MMOL/L (ref 21–32)
CREAT SERPL-MCNC: 0.89 MG/DL (ref 0.6–1.3)
EOSINOPHIL # BLD AUTO: 0.68 THOUSAND/ΜL (ref 0–0.61)
EOSINOPHIL NFR BLD AUTO: 10 % (ref 0–6)
ERYTHROCYTE [DISTWIDTH] IN BLOOD BY AUTOMATED COUNT: 12.7 % (ref 11.6–15.1)
GFR SERPL CREATININE-BSD FRML MDRD: 71 ML/MIN/1.73SQ M
GLUCOSE SERPL-MCNC: 77 MG/DL (ref 65–140)
HCT VFR BLD AUTO: 39 % (ref 34.8–46.1)
HDLC SERPL-MCNC: 50 MG/DL
HGB BLD-MCNC: 12.3 G/DL (ref 11.5–15.4)
IMM GRANULOCYTES # BLD AUTO: 0.07 THOUSAND/UL (ref 0–0.2)
IMM GRANULOCYTES NFR BLD AUTO: 1 % (ref 0–2)
LDLC SERPL CALC-MCNC: 75 MG/DL (ref 0–100)
LYMPHOCYTES # BLD AUTO: 2.43 THOUSANDS/ΜL (ref 0.6–4.47)
LYMPHOCYTES NFR BLD AUTO: 34 % (ref 14–44)
MCH RBC QN AUTO: 33.9 PG (ref 26.8–34.3)
MCHC RBC AUTO-ENTMCNC: 31.5 G/DL (ref 31.4–37.4)
MCV RBC AUTO: 107 FL (ref 82–98)
MONOCYTES # BLD AUTO: 0.56 THOUSAND/ΜL (ref 0.17–1.22)
MONOCYTES NFR BLD AUTO: 8 % (ref 4–12)
NEUTROPHILS # BLD AUTO: 3.37 THOUSANDS/ΜL (ref 1.85–7.62)
NEUTS SEG NFR BLD AUTO: 46 % (ref 43–75)
NONHDLC SERPL-MCNC: 136 MG/DL
NRBC BLD AUTO-RTO: 0 /100 WBCS
PLATELET # BLD AUTO: 163 THOUSANDS/UL (ref 149–390)
PMV BLD AUTO: 12.1 FL (ref 8.9–12.7)
POTASSIUM SERPL-SCNC: 4.6 MMOL/L (ref 3.5–5.3)
PROT SERPL-MCNC: 7.3 G/DL (ref 6.4–8.2)
RBC # BLD AUTO: 3.63 MILLION/UL (ref 3.81–5.12)
SODIUM SERPL-SCNC: 141 MMOL/L (ref 136–145)
TRIGL SERPL-MCNC: 305 MG/DL
TSH SERPL DL<=0.05 MIU/L-ACNC: 3.95 UIU/ML (ref 0.36–3.74)
VALPROATE SERPL-MCNC: 46 UG/ML (ref 50–100)
WBC # BLD AUTO: 7.16 THOUSAND/UL (ref 4.31–10.16)

## 2021-10-13 PROCEDURE — 80164 ASSAY DIPROPYLACETIC ACD TOT: CPT

## 2021-10-13 PROCEDURE — 80061 LIPID PANEL: CPT

## 2021-10-13 PROCEDURE — 80053 COMPREHEN METABOLIC PANEL: CPT

## 2021-10-13 PROCEDURE — 85025 COMPLETE CBC W/AUTO DIFF WBC: CPT

## 2021-10-13 PROCEDURE — 82550 ASSAY OF CK (CPK): CPT

## 2021-10-13 PROCEDURE — 84443 ASSAY THYROID STIM HORMONE: CPT

## 2021-10-13 PROCEDURE — 36415 COLL VENOUS BLD VENIPUNCTURE: CPT

## 2021-10-14 ENCOUNTER — APPOINTMENT (OUTPATIENT)
Dept: LAB | Age: 61
End: 2021-10-14
Payer: COMMERCIAL

## 2021-10-14 LAB
CREAT UR-MCNC: 49.4 MG/DL
MICROALBUMIN UR-MCNC: 9.3 MG/L (ref 0–20)
MICROALBUMIN/CREAT 24H UR: 19 MG/G CREATININE (ref 0–30)

## 2021-10-14 PROCEDURE — 82043 UR ALBUMIN QUANTITATIVE: CPT | Performed by: FAMILY MEDICINE

## 2021-10-14 PROCEDURE — 3061F NEG MICROALBUMINURIA REV: CPT | Performed by: FAMILY MEDICINE

## 2021-10-14 PROCEDURE — 82570 ASSAY OF URINE CREATININE: CPT | Performed by: FAMILY MEDICINE

## 2021-10-14 PROCEDURE — 3061F NEG MICROALBUMINURIA REV: CPT | Performed by: STUDENT IN AN ORGANIZED HEALTH CARE EDUCATION/TRAINING PROGRAM

## 2021-10-18 ENCOUNTER — OFFICE VISIT (OUTPATIENT)
Dept: NEUROLOGY | Facility: CLINIC | Age: 61
End: 2021-10-18
Payer: COMMERCIAL

## 2021-10-18 VITALS — SYSTOLIC BLOOD PRESSURE: 102 MMHG | DIASTOLIC BLOOD PRESSURE: 60 MMHG | TEMPERATURE: 95.6 F | HEART RATE: 70 BPM

## 2021-10-18 DIAGNOSIS — G63 POLYNEUROPATHY ASSOCIATED WITH UNDERLYING DISEASE (HCC): ICD-10-CM

## 2021-10-18 DIAGNOSIS — G82.50 SPASTIC QUADRIPARESIS (HCC): ICD-10-CM

## 2021-10-18 DIAGNOSIS — G20 PARKINSON'S DISEASE (HCC): Primary | ICD-10-CM

## 2021-10-18 DIAGNOSIS — I69.398 SPASTICITY AS LATE EFFECT OF CEREBROVASCULAR ACCIDENT (CVA): ICD-10-CM

## 2021-10-18 DIAGNOSIS — R25.2 SPASTICITY AS LATE EFFECT OF CEREBROVASCULAR ACCIDENT (CVA): ICD-10-CM

## 2021-10-18 PROCEDURE — 99215 OFFICE O/P EST HI 40 MIN: CPT | Performed by: STUDENT IN AN ORGANIZED HEALTH CARE EDUCATION/TRAINING PROGRAM

## 2021-10-18 RX ORDER — LINAGLIPTIN 5 MG/1
5 TABLET, FILM COATED ORAL DAILY
COMMUNITY

## 2021-10-19 RX ORDER — BACLOFEN 10 MG/1
10 TABLET ORAL 3 TIMES DAILY
Qty: 90 TABLET | Refills: 11 | Status: SHIPPED | OUTPATIENT
Start: 2021-10-19

## 2021-11-12 ENCOUNTER — TELEPHONE (OUTPATIENT)
Dept: LAB | Facility: HOSPITAL | Age: 61
End: 2021-11-12

## 2021-11-12 DIAGNOSIS — E11.00 TYPE 2 DIABETES MELLITUS WITH HYPEROSMOLARITY WITHOUT COMA, WITHOUT LONG-TERM CURRENT USE OF INSULIN (HCC): Primary | ICD-10-CM

## 2021-11-16 ENCOUNTER — IMMUNIZATIONS (OUTPATIENT)
Dept: FAMILY MEDICINE CLINIC | Facility: HOSPITAL | Age: 61
End: 2021-11-16

## 2021-11-16 DIAGNOSIS — Z23 ENCOUNTER FOR IMMUNIZATION: Primary | ICD-10-CM

## 2021-11-16 PROCEDURE — 0001A COVID-19 PFIZER VACC 0.3 ML: CPT

## 2021-11-16 PROCEDURE — 91300 COVID-19 PFIZER VACC 0.3 ML: CPT

## 2021-11-29 ENCOUNTER — OFFICE VISIT (OUTPATIENT)
Dept: GASTROENTEROLOGY | Facility: AMBULARY SURGERY CENTER | Age: 61
End: 2021-11-29
Payer: COMMERCIAL

## 2021-11-29 VITALS
SYSTOLIC BLOOD PRESSURE: 104 MMHG | BODY MASS INDEX: 22.25 KG/M2 | DIASTOLIC BLOOD PRESSURE: 62 MMHG | HEIGHT: 58 IN | WEIGHT: 106 LBS

## 2021-11-29 DIAGNOSIS — R13.10 DYSPHAGIA, UNSPECIFIED TYPE: ICD-10-CM

## 2021-11-29 DIAGNOSIS — Z12.11 SCREENING FOR COLON CANCER: Primary | ICD-10-CM

## 2021-11-29 PROCEDURE — 99214 OFFICE O/P EST MOD 30 MIN: CPT | Performed by: PHYSICIAN ASSISTANT

## 2021-11-29 PROCEDURE — 3008F BODY MASS INDEX DOCD: CPT | Performed by: STUDENT IN AN ORGANIZED HEALTH CARE EDUCATION/TRAINING PROGRAM

## 2021-12-02 ENCOUNTER — OFFICE VISIT (OUTPATIENT)
Dept: FAMILY MEDICINE CLINIC | Facility: CLINIC | Age: 61
End: 2021-12-02

## 2021-12-02 VITALS
RESPIRATION RATE: 12 BRPM | SYSTOLIC BLOOD PRESSURE: 116 MMHG | TEMPERATURE: 97.8 F | HEART RATE: 68 BPM | DIASTOLIC BLOOD PRESSURE: 70 MMHG

## 2021-12-02 DIAGNOSIS — E11.00 TYPE 2 DIABETES MELLITUS WITH HYPEROSMOLARITY WITHOUT COMA, WITHOUT LONG-TERM CURRENT USE OF INSULIN (HCC): Primary | ICD-10-CM

## 2021-12-02 DIAGNOSIS — Z23 ENCOUNTER FOR IMMUNIZATION: ICD-10-CM

## 2021-12-02 DIAGNOSIS — R53.81 PHYSICAL DECONDITIONING: ICD-10-CM

## 2021-12-02 DIAGNOSIS — M81.0 OSTEOPOROSIS, UNSPECIFIED OSTEOPOROSIS TYPE, UNSPECIFIED PATHOLOGICAL FRACTURE PRESENCE: ICD-10-CM

## 2021-12-02 DIAGNOSIS — R26.2 AMBULATORY DYSFUNCTION: ICD-10-CM

## 2021-12-02 DIAGNOSIS — R26.9 GAIT DISTURBANCE: ICD-10-CM

## 2021-12-02 DIAGNOSIS — G80.4 ATAXIC CEREBRAL PALSY (HCC): ICD-10-CM

## 2021-12-02 DIAGNOSIS — R13.10 DYSPHAGIA, UNSPECIFIED TYPE: ICD-10-CM

## 2021-12-02 LAB — SL AMB POCT HEMOGLOBIN AIC: 7.3 (ref ?–6.5)

## 2021-12-02 PROCEDURE — 1036F TOBACCO NON-USER: CPT | Performed by: FAMILY MEDICINE

## 2021-12-02 PROCEDURE — 3051F HG A1C>EQUAL 7.0%<8.0%: CPT | Performed by: FAMILY MEDICINE

## 2021-12-02 PROCEDURE — 90682 RIV4 VACC RECOMBINANT DNA IM: CPT | Performed by: FAMILY MEDICINE

## 2021-12-02 PROCEDURE — 99213 OFFICE O/P EST LOW 20 MIN: CPT | Performed by: FAMILY MEDICINE

## 2021-12-02 PROCEDURE — 3051F HG A1C>EQUAL 7.0%<8.0%: CPT | Performed by: PHYSICIAN ASSISTANT

## 2021-12-02 PROCEDURE — G0008 ADMIN INFLUENZA VIRUS VAC: HCPCS | Performed by: FAMILY MEDICINE

## 2021-12-02 PROCEDURE — 83036 HEMOGLOBIN GLYCOSYLATED A1C: CPT | Performed by: FAMILY MEDICINE

## 2021-12-07 DIAGNOSIS — F31.9 BIPOLAR AFFECTIVE DISORDER, REMISSION STATUS UNSPECIFIED (HCC): ICD-10-CM

## 2021-12-09 RX ORDER — VALPROIC ACID 250 MG/5ML
SOLUTION ORAL
Qty: 473 ML | Refills: 10 | Status: SHIPPED | OUTPATIENT
Start: 2021-12-09 | End: 2022-06-29

## 2021-12-15 ENCOUNTER — APPOINTMENT (OUTPATIENT)
Dept: LAB | Facility: HOSPITAL | Age: 61
End: 2021-12-15
Payer: COMMERCIAL

## 2021-12-15 LAB
ANION GAP SERPL CALCULATED.3IONS-SCNC: 4 MMOL/L (ref 4–13)
BUN SERPL-MCNC: 11 MG/DL (ref 5–25)
CALCIUM SERPL-MCNC: 9.9 MG/DL (ref 8.3–10.1)
CHLORIDE SERPL-SCNC: 100 MMOL/L (ref 100–108)
CO2 SERPL-SCNC: 36 MMOL/L (ref 21–32)
CREAT SERPL-MCNC: 0.96 MG/DL (ref 0.6–1.3)
GFR SERPL CREATININE-BSD FRML MDRD: 64 ML/MIN/1.73SQ M
GLUCOSE SERPL-MCNC: 215 MG/DL (ref 65–140)
POTASSIUM SERPL-SCNC: 4.5 MMOL/L (ref 3.5–5.3)
SODIUM SERPL-SCNC: 140 MMOL/L (ref 136–145)

## 2021-12-15 PROCEDURE — 36415 COLL VENOUS BLD VENIPUNCTURE: CPT

## 2021-12-15 PROCEDURE — 80048 BASIC METABOLIC PNL TOTAL CA: CPT

## 2021-12-16 ENCOUNTER — HOSPITAL ENCOUNTER (OUTPATIENT)
Dept: RADIOLOGY | Age: 61
Discharge: HOME | End: 2021-12-16
Payer: COMMERCIAL

## 2021-12-16 VITALS — HEIGHT: 58 IN | WEIGHT: 103 LBS | BODY MASS INDEX: 21.62 KG/M2

## 2021-12-16 DIAGNOSIS — Z12.31 ENCOUNTER FOR SCREENING MAMMOGRAM FOR MALIGNANT NEOPLASM OF BREAST: ICD-10-CM

## 2021-12-16 PROCEDURE — 77067 SCR MAMMO BI INCL CAD: CPT

## 2021-12-16 PROCEDURE — 77063 BREAST TOMOSYNTHESIS BI: CPT

## 2021-12-21 ENCOUNTER — TELEPHONE (OUTPATIENT)
Dept: FAMILY MEDICINE CLINIC | Facility: CLINIC | Age: 61
End: 2021-12-21

## 2021-12-27 ENCOUNTER — CLINICAL SUPPORT (OUTPATIENT)
Dept: FAMILY MEDICINE CLINIC | Facility: CLINIC | Age: 61
End: 2021-12-27

## 2021-12-27 DIAGNOSIS — M81.0 OSTEOPOROSIS, UNSPECIFIED OSTEOPOROSIS TYPE, UNSPECIFIED PATHOLOGICAL FRACTURE PRESENCE: Primary | ICD-10-CM

## 2021-12-27 PROCEDURE — 96372 THER/PROPH/DIAG INJ SC/IM: CPT

## 2021-12-29 ENCOUNTER — APPOINTMENT (EMERGENCY)
Dept: RADIOLOGY | Facility: HOSPITAL | Age: 61
End: 2021-12-29
Payer: COMMERCIAL

## 2021-12-29 ENCOUNTER — HOSPITAL ENCOUNTER (EMERGENCY)
Facility: HOSPITAL | Age: 61
Discharge: HOME | End: 2021-12-29
Attending: EMERGENCY MEDICINE | Admitting: EMERGENCY MEDICINE
Payer: COMMERCIAL

## 2021-12-29 VITALS
OXYGEN SATURATION: 95 % | TEMPERATURE: 98.1 F | RESPIRATION RATE: 18 BRPM | HEART RATE: 65 BPM | DIASTOLIC BLOOD PRESSURE: 62 MMHG | SYSTOLIC BLOOD PRESSURE: 90 MMHG

## 2021-12-29 DIAGNOSIS — N39.0 COMPLICATED UTI (URINARY TRACT INFECTION): Primary | ICD-10-CM

## 2021-12-29 LAB
ALBUMIN SERPL BCP-MCNC: 2.7 G/DL (ref 3.5–5)
ALP SERPL-CCNC: 48 U/L (ref 46–116)
ALT SERPL W P-5'-P-CCNC: 9 U/L (ref 12–78)
ANION GAP SERPL CALCULATED.3IONS-SCNC: 7 MMOL/L (ref 4–13)
AST SERPL W P-5'-P-CCNC: 19 U/L (ref 5–45)
BACTERIA UR QL AUTO: ABNORMAL /HPF
BASOPHILS # BLD AUTO: 0.04 THOUSANDS/ΜL (ref 0–0.1)
BASOPHILS NFR BLD AUTO: 1 % (ref 0–1)
BILIRUB SERPL-MCNC: 0.25 MG/DL (ref 0.2–1)
BILIRUB UR QL STRIP: NEGATIVE
BUN SERPL-MCNC: 18 MG/DL (ref 5–25)
CALCIUM ALBUM COR SERPL-MCNC: 10.6 MG/DL (ref 8.3–10.1)
CALCIUM SERPL-MCNC: 9.6 MG/DL (ref 8.3–10.1)
CHLORIDE SERPL-SCNC: 104 MMOL/L (ref 100–108)
CLARITY UR: ABNORMAL
CO2 SERPL-SCNC: 33 MMOL/L (ref 21–32)
COLOR UR: YELLOW
CREAT SERPL-MCNC: 1.26 MG/DL (ref 0.6–1.3)
EOSINOPHIL # BLD AUTO: 0.13 THOUSAND/ΜL (ref 0–0.61)
EOSINOPHIL NFR BLD AUTO: 2 % (ref 0–6)
ERYTHROCYTE [DISTWIDTH] IN BLOOD BY AUTOMATED COUNT: 12.7 % (ref 11.6–15.1)
EXT PREG TEST URINE: NEGATIVE
EXT. CONTROL ED NAV: NORMAL
FLUAV RNA RESP QL NAA+PROBE: NEGATIVE
FLUBV RNA RESP QL NAA+PROBE: NEGATIVE
GFR SERPL CREATININE-BSD FRML MDRD: 46 ML/MIN/1.73SQ M
GLUCOSE SERPL-MCNC: 129 MG/DL (ref 65–140)
GLUCOSE UR STRIP-MCNC: NEGATIVE MG/DL
HCT VFR BLD AUTO: 35.3 % (ref 34.8–46.1)
HGB BLD-MCNC: 11.7 G/DL (ref 11.5–15.4)
HGB UR QL STRIP.AUTO: ABNORMAL
IMM GRANULOCYTES # BLD AUTO: 0.06 THOUSAND/UL (ref 0–0.2)
IMM GRANULOCYTES NFR BLD AUTO: 1 % (ref 0–2)
KETONES UR STRIP-MCNC: ABNORMAL MG/DL
LEUKOCYTE ESTERASE UR QL STRIP: ABNORMAL
LYMPHOCYTES # BLD AUTO: 1.93 THOUSANDS/ΜL (ref 0.6–4.47)
LYMPHOCYTES NFR BLD AUTO: 25 % (ref 14–44)
MCH RBC QN AUTO: 34.5 PG (ref 26.8–34.3)
MCHC RBC AUTO-ENTMCNC: 33.1 G/DL (ref 31.4–37.4)
MCV RBC AUTO: 104 FL (ref 82–98)
MONOCYTES # BLD AUTO: 0.82 THOUSAND/ΜL (ref 0.17–1.22)
MONOCYTES NFR BLD AUTO: 11 % (ref 4–12)
NEUTROPHILS # BLD AUTO: 4.76 THOUSANDS/ΜL (ref 1.85–7.62)
NEUTS SEG NFR BLD AUTO: 60 % (ref 43–75)
NITRITE UR QL STRIP: NEGATIVE
NON-SQ EPI CELLS URNS QL MICRO: ABNORMAL /HPF
NRBC BLD AUTO-RTO: 0 /100 WBCS
PH UR STRIP.AUTO: 8 [PH]
PLATELET # BLD AUTO: 167 THOUSANDS/UL (ref 149–390)
PMV BLD AUTO: 12.1 FL (ref 8.9–12.7)
POTASSIUM SERPL-SCNC: 5.3 MMOL/L (ref 3.5–5.3)
PROT SERPL-MCNC: 6.8 G/DL (ref 6.4–8.2)
PROT UR STRIP-MCNC: NEGATIVE MG/DL
RBC # BLD AUTO: 3.39 MILLION/UL (ref 3.81–5.12)
RBC #/AREA URNS AUTO: ABNORMAL /HPF
RSV RNA RESP QL NAA+PROBE: NEGATIVE
SARS-COV-2 RNA RESP QL NAA+PROBE: NEGATIVE
SODIUM SERPL-SCNC: 144 MMOL/L (ref 136–145)
SP GR UR STRIP.AUTO: 1.01 (ref 1–1.03)
UROBILINOGEN UR QL STRIP.AUTO: 0.2 E.U./DL
WBC # BLD AUTO: 7.74 THOUSAND/UL (ref 4.31–10.16)
WBC #/AREA URNS AUTO: ABNORMAL /HPF

## 2021-12-29 PROCEDURE — 87186 SC STD MICRODIL/AGAR DIL: CPT

## 2021-12-29 PROCEDURE — 99284 EMERGENCY DEPT VISIT MOD MDM: CPT

## 2021-12-29 PROCEDURE — 81025 URINE PREGNANCY TEST: CPT

## 2021-12-29 PROCEDURE — 80053 COMPREHEN METABOLIC PANEL: CPT

## 2021-12-29 PROCEDURE — 85025 COMPLETE CBC W/AUTO DIFF WBC: CPT

## 2021-12-29 PROCEDURE — 87086 URINE CULTURE/COLONY COUNT: CPT

## 2021-12-29 PROCEDURE — 81001 URINALYSIS AUTO W/SCOPE: CPT

## 2021-12-29 PROCEDURE — 87077 CULTURE AEROBIC IDENTIFY: CPT

## 2021-12-29 PROCEDURE — 99284 EMERGENCY DEPT VISIT MOD MDM: CPT | Performed by: EMERGENCY MEDICINE

## 2021-12-29 PROCEDURE — 71045 X-RAY EXAM CHEST 1 VIEW: CPT

## 2021-12-29 PROCEDURE — 0241U HB NFCT DS VIR RESP RNA 4 TRGT: CPT

## 2021-12-29 PROCEDURE — 96372 THER/PROPH/DIAG INJ SC/IM: CPT

## 2021-12-29 PROCEDURE — 36415 COLL VENOUS BLD VENIPUNCTURE: CPT

## 2021-12-29 RX ORDER — CEPHALEXIN 250 MG/5ML
500 POWDER, FOR SUSPENSION ORAL EVERY 8 HOURS SCHEDULED
Qty: 210 ML | Refills: 0 | Status: SHIPPED | OUTPATIENT
Start: 2021-12-29 | End: 2021-12-29

## 2021-12-29 RX ORDER — CEFPODOXIME PROXETIL 100 MG/5ML
200 GRANULE, FOR SUSPENSION ORAL DAILY
Qty: 70 ML | Refills: 0 | Status: SHIPPED | OUTPATIENT
Start: 2021-12-29 | End: 2022-01-05

## 2021-12-29 RX ORDER — CEPHALEXIN 250 MG/5ML
500 POWDER, FOR SUSPENSION ORAL EVERY 8 HOURS SCHEDULED
Qty: 210 ML | Refills: 0 | Status: SHIPPED | OUTPATIENT
Start: 2021-12-30 | End: 2021-12-29

## 2021-12-29 RX ORDER — CEPHALEXIN 250 MG/5ML
500 POWDER, FOR SUSPENSION ORAL ONCE
Status: DISCONTINUED | OUTPATIENT
Start: 2021-12-29 | End: 2021-12-29

## 2021-12-29 RX ADMIN — CEFTRIAXONE 1000 MG: 1 INJECTION, POWDER, FOR SOLUTION INTRAMUSCULAR; INTRAVENOUS at 16:30

## 2021-12-29 NOTE — DISCHARGE INSTRUCTIONS
Return to ED if fever, chills, worsening symptoms, nausea, vomiting    Take antibiotics as prescribed

## 2021-12-29 NOTE — ED ATTENDING ATTESTATION
12/29/2021  Jennifer HARRIS MD, saw and evaluated the patient  I have discussed the patient with the resident/non-physician practitioner and agree with the resident's/non-physician practitioner's findings, Plan of Care, and MDM as documented in the resident's/non-physician practitioner's note, except where noted  All available labs and Radiology studies were reviewed  I was present for key portions of any procedure(s) performed by the resident/non-physician practitioner and I was immediately available to provide assistance  At this point I agree with the current assessment done in the Emergency Department  I have conducted an independent evaluation of this patient a history and physical is as follows:   Generalized weakness, low-grade fever  The patient is nonverbal, offers no complaints  Afebrile here  Physical exam unremarkable  Urinalysis consistent with UTI  No evidence of systemic bacterial infection  No CVA tenderness  No abdominal tenderness on exam   Stable for outpatient management with oral antibiotics      ED Course         Critical Care Time  Procedures

## 2021-12-29 NOTE — ED PROVIDER NOTES
History  Chief Complaint   Patient presents with    Failure To Thrive     Patient brought in by EMS, had fever last night, will not drink fluids this morning  Hx of this behavior before with previos UTIs     HPI given by patient's caretaker, Milka Mueller, who is a nurse at patient's intermediate care facility [facility] as pt is unable to converse at baseline  Rey Pinto is a 82-year-old female with a PMHx of CP, spasticity, Parkinson's, chronic dysphagia on liquid puree diet and multiple UTI's presenting to the ED due to subjective fever measured tympanically of 100 4 and 99 9 which started last night  Associated fatigue  No fever reducing medications were given to the pt  Patient is able to eat and drink normally  Caretaker denies patient having cough, chills, chest pain, shortness of breath, abdominal pain, hematuria  Prior to Admission Medications   Prescriptions Last Dose Informant Patient Reported? Taking? ARIPiprazole (ABILIFY) 30 mg tablet  Care Giver No No   Sig: Take 1 tablet (30 mg) by mouth daily at 8 pm   Patient taking differently: 20 mg Take 1 tablet (20 mg) by mouth daily at 8 pm   Cholecalciferol (Vitamin D) 50 MCG (2000 UT) tablet  Care Giver No No   Sig: Take 1 tablet (2,000 Units total) by mouth daily   Citalopram Hydrobromide (CELEXA PO)  Care Giver Yes No   Sig: Take 40 mg by mouth every morning     LORazepam (ATIVAN) 0 5 mg tablet  Care Giver Yes No   Sig: Take 0 5 mg by mouth 2 (two) times a day       Oxybutynin Chloride (DITROPAN XL PO)  Care Giver Yes No   Sig: Take 5 mg by mouth 3 (three) times a day    Starch, Thickening, POWD  Care Giver No No   Sig: Take by mouth daily Use on food and liquid as directed   Vitamins A & D (VITAMIN A & D) ointment  Care Giver Yes No   Sig: Apply topically as needed for dry skin   acetaminophen (TYLENOL) 325 mg tablet  Care Giver No No   Sig: TAKE 2 TABLETS BY MOUTH (650MG) EVERY 6 HOURS AS NEEDED FOR PAIN / FEVER GREATER THAN 100 4   baclofen 10 mg tablet  Care Giver No No   Sig: Take 1 tablet (10 mg total) by mouth 3 (three) times a day   benzonatate (TESSALON PERLES) 100 mg capsule  Care Giver No No   Sig: Take 1 capsule (100 mg total) by mouth every 8 (eight) hours as needed for cough   calcium carbonate (OS-MORRIS) 600 MG tablet  Care Giver No No   Sig: Take 1 tablet (600 mg total) by mouth 2 (two) times a day with meals   carbamide peroxide (DEBROX) 6 5 % otic solution  Care Giver Yes No   Sig: Administer 2 drops into both ears daily as needed 2 drops in the affected ear prn x 5 days   carbidopa-levodopa (Sinemet)  mg per tablet  Care Giver No No   Sig: Take 1 tablet by mouth 2 (two) times a day   carboxymethylcellulose (REFRESH TEARS) 0 5 % SOLN  Care Giver Yes No   Si drop 3 (three) times a day as needed for dry eyes   denosumab (PROLIA) 60 mg/mL   No No   Sig: Inject 1 mL (60 mg total) under the skin once for 1 dose   docosanol (ABREVA) 10 %  Care Giver No No   Sig: Apply topically 5 (five) times a day Apply and rub in 5x a day until healed as needed for lesion   ferrous sulfate 324 (65 Fe) mg  Care Giver Yes No   Sig: Take 325 mg by mouth 2 (two) times a day     fexofenadine (ALLEGRA) 180 MG tablet  Care Giver Yes No   Sig: Take 180 mg by mouth as needed     fluticasone (FLONASE) 50 mcg/act nasal spray  Care Giver No No   Si spray into each nostril daily as needed for rhinitis or allergies   guaiFENesin (ROBITUSSIN) 100 MG/5ML oral liquid  Care Giver Yes No   Sig: Take 200 mg by mouth every 8 (eight) hours as needed 2 tsp every 8 hours as needed for cough   hydrocortisone 1 % cream  Care Giver No No   Sig: Apply topically to affected area twice daily as needed for rash   levothyroxine (SYNTHROID) 25 mcg tablet  Care Giver No No   Sig: Take 1 tablet (25 mcg) by mouth every morning   linaGLIPtin (Tradjenta) 5 MG TABS  Care Giver Yes No   Sig: Take 5 mg by mouth daily   linaGLIPtin 5 MG TABS   No No   Sig: Take 5 mg by mouth daily metFORMIN (GLUCOPHAGE) 1000 MG tablet  Care Giver No No   Sig: Take 1 tablet (1,000 mg total) by mouth 2 (two) times a day with meals   neomycin-bacitracin-polymyxin b (NEOSPORIN) ointment  Care Giver Yes No   Sig: Apply 1 application topically 2 (two) times a day as needed   nitrofurantoin (MACRODANTIN) 50 mg capsule  Care Giver Yes No   Sig: Take 50 mg by mouth daily at bedtime   olopatadine HCl (PATADAY) 0 2 % opth drops  Care Giver Yes No   Sig: Administer 1 drop to both eyes as needed   omeprazole (PriLOSEC) 20 mg delayed release capsule   No No   Sig: Take 1 capsule (20 mg total) by mouth 2 (two) times a day before meals   polyethylene glycol (GLYCOLAX) 17 GM/SCOOP powder  Care Giver No No   Sig: DISSOLVE 17GM (1 CAPFUL) IN 8 OZ FLUIDS AND CONSUME BY MOUTH EVERY MORNING *HOLD 1 DAY FOR LOOSE STOOLS* (CONSTIPATION)   simvastatin (ZOCOR) 20 mg tablet  Care Giver No No   Sig: Take 1 tablet (20 mg total) by mouth daily with dinner at 4 pm for hyperlipidemia   traZODone (DESYREL) 100 mg tablet  Care Giver No No   Sig: Take 1 tablet (100 mg total) by mouth daily at  8 pm for depression   valACYclovir (VALTREX) 1,000 mg tablet   No No   Sig: Take 1 tablet (1,000 mg total) by mouth 3 (three) times a day for 7 days   valproic acid (DEPAKENE) 250 MG/5ML soln   No No   Sig: TAKE 2 TEASPOONSFUL (10ML) (500MG) BY MOUTH TWICE A DAY (BIPOLAR)      Facility-Administered Medications: None       Past Medical History:   Diagnosis Date    Adjustment disorder     Altered mental status 12/11/2015    Anemia     Bipolar 1 disorder (HCC)     Cerebral palsy (HCC)     Chronic hypernatremia 2/6/2016    Closed fracture of left hip (HCC) 1/19/2016    Closed left hip fracture (HCC)     no surgery    Constipation     Dehydration 2/20/2016    Diabetes mellitus (HCC)     Disease of thyroid gland     Diverticulosis     Fracture of multiple ribs of right side     Hip fracture ([facility] [address] ) 07/26/2015    left    Hyperlipidemia     Hypernatremia 12/28/2018    Hypotension     Impulse control disorder     Incontinence     Intellectual disability due to developmental disorder, unspecified     Microalbuminuria     Osteopathia     Osteoporosis     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HCC)     Thrombocytopenia ([facility] [address] ) 7/31/2015       Past Surgical History:   Procedure Laterality Date    ABDOMINAL SURGERY      COLECTOMY MIN      re-anastomosis 7/22/16    COLONOSCOPY N/A 7/21/2016    Procedure: COLONOSCOPY;  Surgeon: Kalyani Apple MD;  [facility]: [facility]; Service:     COLONOSCOPY N/A 1/31/2016    Procedure: COLONOSCOPY;  Surgeon: Kalyani Apple MD;  [facility];  Service:     COLONOSCOPY N/A 7/25/2017    Procedure: COLONOSCOPY;  Surgeon: Kalyani Apple MD;  [facility]; Service: Colorectal    COLOSTOMY      EXPLORATORY LAPAROTOMY W/ BOWEL RESECTION N/A 1/31/2016    Procedure: exploratory laparotomy, left sigmoidectomy, coloproctostomy, take down splenic flexure, loop colostomy;  Surgeon: Kalyani Apple MD;  [facility];  Service:     OR CLOSE ENTEROSTOMY N/A 7/22/2016    Procedure: SEGMENTAL COLECTOMY WITH COLOCOLOSTOMY;  Surgeon: Kalyani Apple MD;  [facility];  Service: Colorectal    UPPER GASTROINTESTINAL ENDOSCOPY         Family History   Problem Relation Age of Onset    Alcohol abuse Mother     Alcohol abuse Father     Diabetes Sister     No Known Problems Sister     No Known Problems Sister      I have reviewed and agree with the history as documented      E-Cigarette/Vaping    E-Cigarette Use Never User      E-Cigarette/Vaping Substances    Nicotine No     THC No     CBD No     Flavoring No     Other No     Unknown No      Social History     Tobacco Use    Smoking status: Never Smoker    Smokeless tobacco: Never Used   Vaping Use    Vaping Use: Never used   Substance Use Topics    Alcohol use: Not Currently    Drug use: No        Review of Systems   Constitutional: Positive for fatigue  Negative for appetite change, chills and fever  Subjective fever   HENT: Negative for ear pain, mouth sores, nosebleeds, postnasal drip, rhinorrhea, sinus pain and sore throat  Eyes: Negative for pain and visual disturbance  Respiratory: Negative for cough and shortness of breath  Cardiovascular: Negative for chest pain and palpitations  Gastrointestinal: Negative for abdominal pain, blood in stool, diarrhea, nausea and vomiting  Genitourinary: Negative for dysuria and hematuria  Musculoskeletal: Negative for arthralgias and back pain  Skin: Negative for color change and rash  Neurological: Negative for dizziness, seizures, syncope, light-headedness and headaches  All other systems reviewed and are negative  Physical Exam  ED Triage Vitals   Temperature Pulse Respirations Blood Pressure SpO2   12/29/21 1052 12/29/21 1235 12/29/21 1235 12/29/21 1235 12/29/21 1235   98 1 °F (36 7 °C) 66 18 92/65 95 %      Temp Source Heart Rate Source Patient Position - Orthostatic VS BP Location FiO2 (%)   12/29/21 1052 12/29/21 1235 12/29/21 1235 12/29/21 1235 --   Axillary Monitor Lying Right arm       Pain Score       --                    Orthostatic Vital Signs  Vitals:    12/29/21 1235 12/29/21 1536   BP: 92/65 90/62   Pulse: 66 65   Patient Position - Orthostatic VS: Lying Lying       Physical Exam  Vitals and nursing note reviewed  Constitutional:       General: She is not in acute distress  Appearance: She is well-developed  HENT:      Head: Normocephalic and atraumatic  Nose: Nose normal       Mouth/Throat:      Mouth: Mucous membranes are moist       Pharynx: Oropharynx is clear  Eyes:      General: Lids are normal  Lids are everted, no foreign bodies appreciated  Gaze aligned appropriately  Extraocular Movements: Extraocular movements intact  Conjunctiva/sclera: Conjunctivae normal       Pupils: Pupils are equal, round, and reactive to light  Comments: Pupils 2 mm equal round reactive to light  Cardiovascular:      Rate and Rhythm: Normal rate and regular rhythm  Pulses: Normal pulses  Radial pulses are 2+ on the right side and 2+ on the left side  Dorsalis pedis pulses are 2+ on the right side and 2+ on the left side  Heart sounds: Normal heart sounds  No murmur heard  Comments: No calf tenderness, erythema, rash, or swelling bilaterally  Pulmonary:      Effort: Pulmonary effort is normal  No respiratory distress  Breath sounds: Normal breath sounds and air entry  Chest:      Comments: Nontender chest wall  Abdominal:      General: Abdomen is flat  Bowel sounds are normal       Palpations: Abdomen is soft  Tenderness: There is no abdominal tenderness  There is no guarding or rebound  Comments: Abdomen soft nontender, non peritoneal    Musculoskeletal:      Cervical back: Neck supple  Right lower leg: No edema  Left lower leg: No edema  Comments: Rigidity and spasticity of all extremities  Skin:     General: Skin is warm and dry  Capillary Refill: Capillary refill takes less than 2 seconds  Neurological:      Mental Status: She is alert  Mental status is at baseline  ED Medications  Medications   cefTRIAXone (ROCEPHIN) 1,000 mg in lidocaine (PF) (XYLOCAINE-MPF) 1 % IM only syringe (1,000 mg Intramuscular Given 12/29/21 1630)       Diagnostic Studies  Results Reviewed     Procedure Component Value Units Date/Time    Urine culture [[id number]] Collected: 12/29/21 1358    Lab Status:  In process Specimen: Urine, Straight Cath Updated: 12/29/21 1500    Urine Microscopic [[id number]]  (Abnormal) Collected: 12/29/21 1358    Lab Status: Final result Specimen: Urine, Straight Cath Updated: 12/29/21 1432     RBC, UA 4-10 /hpf      WBC, UA 30-50 /hpf      Epithelial Cells None Seen /hpf      Bacteria, UA Innumerable /hpf     Urine culture [[id number]] Collected: 12/29/21 1358 Lab Status: In process Specimen: Urine, Straight Cath Updated: 12/29/21 1432    UA w Reflex to Microscopic w Reflex to Culture [[id number]]  (Abnormal) Collected: 12/29/21 1358    Lab Status: Final result Specimen: Urine, Straight Cath Updated: 12/29/21 1409     Color, UA Yellow     Clarity, UA Slightly Cloudy     Specific Gravity, UA 1 015     pH, UA 8 0     Leukocytes, UA Large     Nitrite, UA Negative     Protein, UA Negative mg/dl      Glucose, UA Negative mg/dl      Ketones, UA Trace mg/dl      Urobilinogen, UA 0 2 E U /dl      Bilirubin, UA Negative     Blood, UA Small    POCT pregnancy, urine [[id number]]  (Normal) Resulted: 12/29/21 1404    Lab Status: Final result Updated: 12/29/21 1404     EXT PREG TEST UR (Ref: Negative) negative     Control valid    COVID/FLU/RSV - 2 hour TAT [[id number]]  (Normal) Collected: 12/29/21 1235    Lab Status: Final result Specimen: Nares Updated: 12/29/21 1331     SARS-CoV-2 Negative     INFLUENZA A PCR Negative     INFLUENZA B PCR Negative     RSV PCR Negative    Narrative:      FOR PEDIATRIC PATIENTS - copy/paste COVID Guidelines URL to browser: [email or web address]  [email or web address]     This test has been authorized by FDA under an EUA (Emergency Use Assay) for use by authorized laboratories  Clinical caution and judgement should be used with the interpretation of these results with consideration of the clinical impression and other laboratory testing  Testing reported as "Positive" or "Negative" has been proven to be accurate according to standard laboratory validation requirements  All testing is performed with control materials showing appropriate reactivity at standard intervals      Comprehensive metabolic panel [[id number]]  (Abnormal) Collected: 12/29/21 1235    Lab Status: Final result Specimen: Blood Updated: 12/29/21 1257     Sodium 144 mmol/L      Potassium 5 3 mmol/L      Chloride 104 mmol/L      CO2 33 mmol/L      ANION GAP 7 mmol/L      BUN 18 mg/dL      Creatinine 1 26 mg/dL      Glucose 129 mg/dL      Calcium 9 6 mg/dL      Corrected Calcium 10 6 mg/dL      AST 19 U/L      ALT 9 U/L      Alkaline Phosphatase 48 U/L      Total Protein 6 8 g/dL      Albumin 2 7 g/dL      Total Bilirubin 0 25 mg/dL      eGFR 46 ml/min/1 73sq m     Narrative:      [place] guidelines for Chronic Kidney Disease (CKD):     Stage 1 with normal or high GFR (GFR > 90 mL/min/1 73 square meters)    Stage 2 Mild CKD (GFR = 60-89 mL/min/1 73 square meters)    Stage 3A Moderate CKD (GFR = 45-59 mL/min/1 73 square meters)    Stage 3B Moderate CKD (GFR = 30-44 mL/min/1 73 square meters)    Stage 4 Severe CKD (GFR = 15-29 mL/min/1 73 square meters)    Stage 5 End Stage CKD (GFR <15 mL/min/1 73 square meters)  Note: GFR calculation is accurate only with a steady state creatinine    CBC and differential [[id number]]  (Abnormal) Collected: 12/29/21 1235    Lab Status: Final result Specimen: Blood Updated: 12/29/21 1244     WBC 7 74 Thousand/uL      RBC 3 39 Million/uL      Hemoglobin 11 7 g/dL      Hematocrit 35 3 %       fL      MCH 34 5 pg      MCHC 33 1 g/dL      RDW 12 7 %      MPV 12 1 fL      Platelets 738 Thousands/uL      nRBC 0 /100 WBCs      Neutrophils Relative 60 %      Immat GRANS % 1 %      Lymphocytes Relative 25 %      Monocytes Relative 11 %      Eosinophils Relative 2 %      Basophils Relative 1 %      Neutrophils Absolute 4 76 Thousands/µL      Immature Grans Absolute 0 06 Thousand/uL      Lymphocytes Absolute 1 93 Thousands/µL      Monocytes Absolute 0 82 Thousand/µL      Eosinophils Absolute 0 13 Thousand/µL      Basophils Absolute 0 04 Thousands/µL                  XR chest 1 view portable   Final Result by Gerhardt Brier, MD (12/29 1440)   No acute cardiopulmonary disease        Findings are stable            Workstation performed: [id number]               Procedures  Procedures      ED Course  ED Course as of 12/29/21 1635   Wed Dec 29, 2021   1245 CBC within pt's baseline   1350 Pt and caretaker updated about labs   [phone] Pt and caretaker updated about UTI; 1g rocephin IV ordered   [id number] [facility] called for abx recommendations   [phone] Caretaker understands D/C instructions and medication instructions                                       MDM  Number of Diagnoses or Management Options  Complicated UTI (urinary tract infection)  Diagnosis management comments: HPI given by patient's caretaker, Jenna Jun, who is a nurse at patient's intermediate care facility [facility] as pt is unable to converse at baseline  Gunnar Stephens is a 82-year-old female with a PMHx of CP, spasticity, Parkinson's, chronic dysphagia on liquid puree diet and multiple UTI's presenting to the ED due to fever measured tympanically of 100 4 and 99 9 which started last night  Associated fatigue  Patient is able to eat and drink normally  Caretaker denies patient having cough, chills, chest pain, shortness of breath, abdominal pain, hematuria  ED course:  Vitals stable, afebrile  Labs and imaging ordered  UA showed UTI, culture pending  Pt was given 1 g IM rocephin while in ED  Discharged home on [place]  Disposition  Final diagnoses:   Complicated UTI (urinary tract infection)     Time reflects when diagnosis was documented in both MDM as applicable and the Disposition within this note     Time User Action Codes Description Comment    12/29/2021  3:03 PM Josiane De La Cruz Add [N39 0] UTI (urinary tract infection)     12/29/2021  3:14 PM Pema Hickman Add [Z13 3] Complicated UTI (urinary tract infection)     12/29/2021  3:14 PM Josiane De La Cruz Modify [Q17 7] Complicated UTI (urinary tract infection)     12/29/2021  3:14 PM Pema Hickman Remove [N39 0] UTI (urinary tract infection)       ED Disposition     ED Disposition Condition Date/Time Comment    Discharge Stable Wed Dec 29, 2021  3:03 PM Yuan Wilson discharge to home/self care  Follow-up Information    None         Patient's Medications   Discharge Prescriptions    CEFPODOXIME (VANTIN) 100 MG/5 ML SUSPENSION    Take 10 mL (200 mg total) by mouth in the morning for 7 days       Start Date: 12/29/2021End Date: 1/5/2022       Order Dose: 200 mg       Quantity: 70 mL    Refills: 0     No discharge procedures on file  PDMP Review       Value Time User    PDMP Reviewed  Yes 9/1/2020  3:35 PM Jeancarlos Motley MD           ED Provider  Attending physically available and evaluated Yuan Steve HARRIS managed the patient along with the ED Attending      Electronically Signed by         Amy Padgett MD  12/29/21 0357

## 2021-12-31 LAB
BACTERIA UR CULT: ABNORMAL
BACTERIA UR CULT: ABNORMAL

## 2022-01-03 ENCOUNTER — TELEPHONE (OUTPATIENT)
Dept: LAB | Facility: HOSPITAL | Age: 62
End: 2022-01-03

## 2022-01-04 ENCOUNTER — TELEMEDICINE (OUTPATIENT)
Dept: FAMILY MEDICINE CLINIC | Facility: CLINIC | Age: 62
End: 2022-01-04

## 2022-01-04 VITALS
BODY MASS INDEX: 21.95 KG/M2 | RESPIRATION RATE: 16 BRPM | DIASTOLIC BLOOD PRESSURE: 43 MMHG | WEIGHT: 105 LBS | OXYGEN SATURATION: 98 % | HEART RATE: 85 BPM | TEMPERATURE: 96.8 F | SYSTOLIC BLOOD PRESSURE: 102 MMHG

## 2022-01-04 DIAGNOSIS — N39.0 RECURRENT UTI (URINARY TRACT INFECTION): Primary | ICD-10-CM

## 2022-01-04 PROCEDURE — 99213 OFFICE O/P EST LOW 20 MIN: CPT | Performed by: FAMILY MEDICINE

## 2022-01-04 NOTE — ASSESSMENT & PLAN NOTE
Improving following recent ER visit on 12/29  Has been afebrile since discharge and return to baseline in regards to energy level   Urine culture showed Klebsiella which patient is colonized with  S/P 1 dose of Rocephin in ED and sent home with 7 days of Vantin  Advised to continue full course of Vantin with last day tomorrow  If patient has return of symptoms, UA and cultured ordered

## 2022-01-04 NOTE — PROGRESS NOTES
Assessment/Plan:    Recurrent UTI (urinary tract infection)  Improving following recent ER visit on 12/29  Has been afebrile since discharge and return to baseline in regards to energy level   Urine culture showed Klebsiella which patient is colonized with  S/P 1 dose of Rocephin in ED and sent home with 7 days of Vantin  Advised to continue full course of Vantin with last day tomorrow  If patient has return of symptoms, UA and cultured ordered        Diagnoses and all orders for this visit:    Recurrent UTI (urinary tract infection)  -     UA w Reflex to Microscopic w Reflex to Culture -Lab Collect; Future  -     Urine culture; Future          Subjective:      Patient ID: Yuan Wilson is a 63 y o  female  HPI    Patient is a 64 yo female with hx of Cerebal Palsy, DM, recurrent UTIs and dysphagia who presents for ER follow up  Patient was seen and evaluated in the ER on 12/29/21 after being found to have a fever at the facility of 100 4 and elevated temperature of 99 9 that morning with associated change in activity level  Patient was evaluated and UA showed positive LE and negative nitrites  She was given a dose of Rocephin in the ED and urine sent for culture  COVID/Flu/RSV was negative  She was sent home with Vantin 200mg/5ml suspension for 7 days  Urine culture returned showing Klebsiella and showed susceptibilities  Since discharge she has been doing well  No fever since she returned home with temperature between 96 4 and 97 8  She has been urinating regularly and is tolerating PO intake  Her energy level has returned to her baseline and Lisa, her nurse at [facility], reports she has been doing well overall         The following portions of the patient's history were reviewed and updated as appropriate: allergies, current medications, past family history, past medical history, past social history, past surgical history and problem list     Current Outpatient Medications:     acetaminophen (TYLENOL) 325 mg tablet, TAKE 2 TABLETS BY MOUTH (650MG) EVERY 6 HOURS AS NEEDED FOR PAIN / FEVER GREATER THAN 100 4, Disp: 10 tablet, Rfl: 11    ARIPiprazole (ABILIFY) 30 mg tablet, Take 1 tablet (30 mg) by mouth daily at 8 pm (Patient taking differently: 20 mg Take 1 tablet (20 mg) by mouth daily at 8 pm), Disp: , Rfl: 0    baclofen 10 mg tablet, Take 1 tablet (10 mg total) by mouth 3 (three) times a day, Disp: 90 tablet, Rfl: 11    benzonatate (TESSALON PERLES) 100 mg capsule, Take 1 capsule (100 mg total) by mouth every 8 (eight) hours as needed for cough, Disp: 30 capsule, Rfl: 0    calcium carbonate (OS-MORRIS) 600 MG tablet, Take 1 tablet (600 mg total) by mouth 2 (two) times a day with meals, Disp: 60 tablet, Rfl: 2    carbamide peroxide (DEBROX) 6 5 % otic solution, Administer 2 drops into both ears daily as needed 2 drops in the affected ear prn x 5 days, Disp: , Rfl:     carbidopa-levodopa (Sinemet)  mg per tablet, Take 1 tablet by mouth 2 (two) times a day, Disp: 120 tablet, Rfl: 3    carboxymethylcellulose (REFRESH TEARS) 0 5 % SOLN, 1 drop 3 (three) times a day as needed for dry eyes, Disp: , Rfl:     cefpodoxime (VANTIN) 100 mg/5 mL suspension, Take 10 mL (200 mg total) by mouth in the morning for 7 days, Disp: 70 mL, Rfl: 0    Cholecalciferol (Vitamin D) 50 MCG (2000 UT) tablet, Take 1 tablet (2,000 Units total) by mouth daily, Disp: 30 tablet, Rfl: 5    Citalopram Hydrobromide (CELEXA PO), Take 40 mg by mouth every morning  , Disp: , Rfl:     denosumab (PROLIA) 60 mg/mL, Inject 1 mL (60 mg total) under the skin once for 1 dose, Disp: 1 mL, Rfl: 1    docosanol (ABREVA) 10 %, Apply topically 5 (five) times a day Apply and rub in 5x a day until healed as needed for lesion, Disp: , Rfl: 0    ferrous sulfate 324 (65 Fe) mg, Take 325 mg by mouth 2 (two) times a day , Disp: , Rfl:     fexofenadine (ALLEGRA) 180 MG tablet, Take 180 mg by mouth as needed  , Disp: , Rfl:     fluticasone (FLONASE) 50 mcg/act nasal spray, 1 spray into each nostril daily as needed for rhinitis or allergies, Disp: 1 Bottle, Rfl: 1    guaiFENesin (ROBITUSSIN) 100 MG/5ML oral liquid, Take 200 mg by mouth every 8 (eight) hours as needed 2 tsp every 8 hours as needed for cough, Disp: , Rfl:     hydrocortisone 1 % cream, Apply topically to affected area twice daily as needed for rash, Disp: 30 g, Rfl: 0    levothyroxine (SYNTHROID) 25 mcg tablet, Take 1 tablet (25 mcg) by mouth every morning, Disp: , Rfl: 0    linaGLIPtin (Tradjenta) 5 MG TABS, Take 5 mg by mouth daily, Disp: , Rfl:     linaGLIPtin 5 MG TABS, Take 5 mg by mouth daily, Disp: 90 tablet, Rfl: 0    LORazepam (ATIVAN) 0 5 mg tablet, Take 0 5 mg by mouth 2 (two) times a day   , Disp: , Rfl:     metFORMIN (GLUCOPHAGE) 1000 MG tablet, Take 1 tablet (1,000 mg total) by mouth 2 (two) times a day with meals, Disp: 60 tablet, Rfl: 2    neomycin-bacitracin-polymyxin b (NEOSPORIN) ointment, Apply 1 application topically 2 (two) times a day as needed, Disp: , Rfl:     nitrofurantoin (MACRODANTIN) 50 mg capsule, Take 50 mg by mouth daily at bedtime, Disp: , Rfl:     olopatadine HCl (PATADAY) 0 2 % opth drops, Administer 1 drop to both eyes as needed, Disp: , Rfl:     omeprazole (PriLOSEC) 20 mg delayed release capsule, Take 1 capsule (20 mg total) by mouth 2 (two) times a day before meals, Disp: 60 capsule, Rfl: 2    Oxybutynin Chloride (DITROPAN XL PO), Take 5 mg by mouth 3 (three) times a day , Disp: , Rfl:     polyethylene glycol (GLYCOLAX) 17 GM/SCOOP powder, DISSOLVE 17GM (1 CAPFUL) IN 8 OZ FLUIDS AND CONSUME BY MOUTH EVERY MORNING *HOLD 1 DAY FOR LOOSE STOOLS* (CONSTIPATION), Disp: 510 g, Rfl: 10    simvastatin (ZOCOR) 20 mg tablet, Take 1 tablet (20 mg total) by mouth daily with dinner at 4 pm for hyperlipidemia, Disp: , Rfl:     Starch, Thickening, POWD, Take by mouth daily Use on food and liquid as directed, Disp: 1020 g, Rfl: 3    traZODone (DESYREL) 100 mg tablet, Take 1 tablet (100 mg total) by mouth daily at  8 pm for depression, Disp: , Rfl: 0    valACYclovir (VALTREX) 1,000 mg tablet, Take 1 tablet (1,000 mg total) by mouth 3 (three) times a day for 7 days, Disp: 21 tablet, Rfl: 0    valproic acid (DEPAKENE) 250 MG/5ML soln, TAKE 2 TEASPOONSFUL (10ML) (500MG) BY MOUTH TWICE A DAY (BIPOLAR), Disp: 473 mL, Rfl: 10    Vitamins A & D (VITAMIN A & D) ointment, Apply topically as needed for dry skin, Disp: , Rfl:     Review of Systems   Constitutional: Negative for appetite change, chills and fatigue  HENT: Negative for congestion and rhinorrhea  Respiratory: Negative for cough and shortness of breath  Cardiovascular: Negative for leg swelling  Gastrointestinal: Negative for abdominal pain  Genitourinary: Negative for difficulty urinating and hematuria  Neurological: Negative for weakness  Psychiatric/Behavioral: Negative for agitation and behavioral problems  Objective:      BP (!) 102/43 (BP Location: Left arm)   Pulse 85   Temp (!) 96 8 °F (36 °C)   Resp 16   Wt 47 6 kg (105 lb)   LMP 11/29/2009 (Exact Date)   SpO2 98%   BMI 21 95 kg/m²          Physical Exam  Vitals reviewed  Constitutional:       General: She is not in acute distress  Appearance: Normal appearance  She is not ill-appearing  Cardiovascular:      Rate and Rhythm: Normal rate and regular rhythm  Pulmonary:      Effort: Pulmonary effort is normal       Breath sounds: Normal breath sounds  No wheezing, rhonchi or rales  Abdominal:      General: Bowel sounds are normal       Palpations: Abdomen is soft  Tenderness: There is no abdominal tenderness  There is no guarding  Skin:     General: Skin is warm  Neurological:      Mental Status: She is alert  Mental status is at baseline         Of note: physical exam performed by her nurse Rodrick Olmstead

## 2022-01-10 ENCOUNTER — TELEPHONE (OUTPATIENT)
Dept: LAB | Facility: HOSPITAL | Age: 62
End: 2022-01-10

## 2022-01-12 ENCOUNTER — APPOINTMENT (OUTPATIENT)
Dept: LAB | Facility: HOSPITAL | Age: 62
End: 2022-01-12
Payer: MEDICARE

## 2022-01-12 DIAGNOSIS — E11.00 TYPE 2 DIABETES MELLITUS WITH HYPEROSMOLARITY WITHOUT COMA, WITHOUT LONG-TERM CURRENT USE OF INSULIN (HCC): ICD-10-CM

## 2022-01-12 DIAGNOSIS — N39.0 RECURRENT UTI (URINARY TRACT INFECTION): ICD-10-CM

## 2022-01-12 LAB
ANION GAP SERPL CALCULATED.3IONS-SCNC: 8 MMOL/L (ref 4–13)
BUN SERPL-MCNC: 14 MG/DL (ref 5–25)
CALCIUM SERPL-MCNC: 10.7 MG/DL (ref 8.3–10.1)
CHLORIDE SERPL-SCNC: 103 MMOL/L (ref 100–108)
CO2 SERPL-SCNC: 30 MMOL/L (ref 21–32)
CREAT SERPL-MCNC: 1.08 MG/DL (ref 0.6–1.3)
GFR SERPL CREATININE-BSD FRML MDRD: 56 ML/MIN/1.73SQ M
GLUCOSE P FAST SERPL-MCNC: 270 MG/DL (ref 65–99)
POTASSIUM SERPL-SCNC: 4.9 MMOL/L (ref 3.5–5.3)
SODIUM SERPL-SCNC: 141 MMOL/L (ref 136–145)

## 2022-01-12 PROCEDURE — 80048 BASIC METABOLIC PNL TOTAL CA: CPT

## 2022-01-12 PROCEDURE — 36415 COLL VENOUS BLD VENIPUNCTURE: CPT

## 2022-01-18 ENCOUNTER — TELEMEDICINE (OUTPATIENT)
Dept: FAMILY MEDICINE CLINIC | Facility: CLINIC | Age: 62
End: 2022-01-18

## 2022-01-18 VITALS — SYSTOLIC BLOOD PRESSURE: 90 MMHG | TEMPERATURE: 97.7 F | DIASTOLIC BLOOD PRESSURE: 64 MMHG

## 2022-01-18 DIAGNOSIS — E11.00 TYPE 2 DIABETES MELLITUS WITH HYPEROSMOLARITY WITHOUT COMA, WITHOUT LONG-TERM CURRENT USE OF INSULIN (HCC): Primary | ICD-10-CM

## 2022-01-18 PROCEDURE — 99213 OFFICE O/P EST LOW 20 MIN: CPT | Performed by: FAMILY MEDICINE

## 2022-01-18 NOTE — ASSESSMENT & PLAN NOTE
Lab Results   Component Value Date    HGBA1C 7 3 (A) 12/02/2021   BMP with glucose 270, caretaker states this was fasting  Home monitoring always < 180  No recent diet changes  Compliant with Metformin and Linagliptin, continue regimen and follow up in March for next A1C

## 2022-01-18 NOTE — PROGRESS NOTES
Virtual Regular Visit    Verification of patient location:    Patient is located in the following state in which I hold an active license [place]      Assessment/Plan:    Problem List Items Addressed This Visit        Endocrine    Type 2 diabetes mellitus, without long-term current use of insulin ([facility] [address] ) - Primary       Lab Results   Component Value Date    HGBA1C 7 3 (A) 12/02/2021   BMP with glucose 270, caretaker states this was fasting  Home monitoring always < 180  No recent diet changes  Compliant with Metformin and Linagliptin, continue regimen and follow up in March for next A1C  Reason for visit is   Chief Complaint   Patient presents with    Virtual Regular Visit        Encounter provider Leydi Britt DO    Provider located at [address] [place] [id number] [phone]      Recent Visits  No visits were found meeting these conditions  Showing recent visits within past 7 days and meeting all other requirements  Today's Visits  Date Type Provider Dept   01/18/22 Telemedicine Leydi Britt DO [facility]   Showing today's visits and meeting all other requirements  Future Appointments  No visits were found meeting these conditions  Showing future appointments within next 150 days and meeting all other requirements       The patient was identified by name and date of birth  Oumou Webster was informed that this is a telemedicine visit and that the visit is being conducted through Telephone  My office door was closed  The patient was notified the following individuals were present in the room MS3  She acknowledged consent and understanding of privacy and security of the video platform  The patient has agreed to participate and understands they can discontinue the visit at any time  Patient is aware this is a billable service  Subjective  Oumou Webster is a 63 y o  female        HPI   Patient presents for follow up of ED follow up visit on 1/4/22  She was treated for UTI in the ED on 12/29/21 and is doing well with no acute concerns  Her caretaker notes than on BMP her glucose was noted to be 270, however, numbers are much lower at home on regular monitoring, ranging from 130-180  Her last a1c is 7 3 in December 2021  She is compliant with her medications and denies diet or activity changes  Past Medical History:   Diagnosis Date    Adjustment disorder     Altered mental status 12/11/2015    Anemia     Bipolar 1 disorder (HCC)     Cerebral palsy ([facility] [address] )     Chronic hypernatremia 2/6/2016    [facility] of [facility] 1/19/2016    Closed left hip fracture (HCC)     no surgery    Constipation     Dehydration 2/20/2016    Diabetes mellitus [facility]     Disease of thyroid gland     Diverticulosis     Fracture of multiple ribs of right side     Hip fracture ([facility] ) 07/26/2015    left    Hyperlipidemia     Hypernatremia 12/28/2018    Hypotension     Impulse control disorder     Incontinence     Intellectual disability due to developmental disorder, unspecified     Microalbuminuria     Osteopathia     Osteoporosis     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[facility] )     Thrombocytopenia ([facility] ) 7/31/2015       Past Surgical History:   Procedure Laterality Date    ABDOMINAL SURGERY      COLECTOMY MIN      re-anastomosis 7/22/16    COLONOSCOPY N/A 7/21/2016    Procedure: COLONOSCOPY;  Surgeon: Alexus Ordaz MD;  [facility]; Service:     COLONOSCOPY N/A 1/31/2016    Procedure: COLONOSCOPY;  Surgeon: Alexus Ordaz MD;  [facility];  Service:     COLONOSCOPY N/A 7/25/2017    Procedure: COLONOSCOPY;  Surgeon: Alexus Ordaz MD;  [facility];   Service: Colorectal    COLOSTOMY      EXPLORATORY LAPAROTOMY W/ BOWEL RESECTION N/A 1/31/2016    Procedure: exploratory laparotomy, left sigmoidectomy, coloproctostomy, take down splenic flexure, loop colostomy;  Surgeon: Sarwat Brito MD Naa;  [facility];  Service:     NE CLOSE ENTEROSTOMY N/A 7/22/2016    Procedure: SEGMENTAL COLECTOMY WITH COLOCOLOSTOMY;  Surgeon: Isaac Hall MD;  [facility];  Service: Colorectal    UPPER GASTROINTESTINAL ENDOSCOPY         Current Outpatient Medications   Medication Sig Dispense Refill    acetaminophen (TYLENOL) 325 mg tablet TAKE 2 TABLETS BY MOUTH (650MG) EVERY 6 HOURS AS NEEDED FOR PAIN / FEVER GREATER THAN 100 4 10 tablet 11    ARIPiprazole (ABILIFY) 30 mg tablet Take 1 tablet (30 mg) by mouth daily at 8 pm (Patient taking differently: 20 mg Take 1 tablet (20 mg) by mouth daily at 8 pm)  0    baclofen 10 mg tablet Take 1 tablet (10 mg total) by mouth 3 (three) times a day 90 tablet 11    benzonatate (TESSALON PERLES) 100 mg capsule Take 1 capsule (100 mg total) by mouth every 8 (eight) hours as needed for cough 30 capsule 0    calcium carbonate (OS-MORRIS) 600 MG tablet Take 1 tablet (600 mg total) by mouth 2 (two) times a day with meals 60 tablet 2    carbamide peroxide (DEBROX) 6 5 % otic solution Administer 2 drops into both ears daily as needed 2 drops in the affected ear prn x 5 days      carbidopa-levodopa (Sinemet)  mg per tablet Take 1 tablet by mouth 2 (two) times a day 120 tablet 3    carboxymethylcellulose (REFRESH TEARS) 0 5 % SOLN 1 drop 3 (three) times a day as needed for dry eyes      Cholecalciferol (Vitamin D) 50 MCG (2000 UT) tablet Take 1 tablet (2,000 Units total) by mouth daily 30 tablet 5    Citalopram Hydrobromide (CELEXA PO) Take 40 mg by mouth every morning        denosumab (PROLIA) 60 mg/mL Inject 1 mL (60 mg total) under the skin once for 1 dose 1 mL 1    docosanol (ABREVA) 10 % Apply topically 5 (five) times a day Apply and rub in 5x a day until healed as needed for lesion  0    ferrous sulfate 324 (65 Fe) mg Take 325 mg by mouth 2 (two) times a day        fexofenadine (ALLEGRA) 180 MG tablet Take 180 mg by mouth as needed  fluticasone (FLONASE) 50 mcg/act nasal spray 1 spray into each nostril daily as needed for rhinitis or allergies 1 Bottle 1    guaiFENesin (ROBITUSSIN) 100 MG/5ML oral liquid Take 200 mg by mouth every 8 (eight) hours as needed 2 tsp every 8 hours as needed for cough      hydrocortisone 1 % cream Apply topically to affected area twice daily as needed for rash 30 g 0    levothyroxine (SYNTHROID) 25 mcg tablet Take 1 tablet (25 mcg) by mouth every morning  0    linaGLIPtin (Tradjenta) 5 MG TABS Take 5 mg by mouth daily      linaGLIPtin 5 MG TABS Take 5 mg by mouth daily 90 tablet 0    LORazepam (ATIVAN) 0 5 mg tablet Take 0 5 mg by mouth 2 (two) times a day          metFORMIN (GLUCOPHAGE) 1000 MG tablet Take 1 tablet (1,000 mg total) by mouth 2 (two) times a day with meals 60 tablet 2    neomycin-bacitracin-polymyxin b (NEOSPORIN) ointment Apply 1 application topically 2 (two) times a day as needed      nitrofurantoin (MACRODANTIN) 50 mg capsule Take 50 mg by mouth daily at bedtime      olopatadine HCl (PATADAY) 0 2 % opth drops Administer 1 drop to both eyes as needed      omeprazole (PriLOSEC) 20 mg delayed release capsule Take 1 capsule (20 mg total) by mouth 2 (two) times a day before meals 60 capsule 2    Oxybutynin Chloride (DITROPAN XL PO) Take 5 mg by mouth 3 (three) times a day       polyethylene glycol (GLYCOLAX) 17 GM/SCOOP powder DISSOLVE 17GM (1 CAPFUL) IN 8 OZ FLUIDS AND CONSUME BY MOUTH EVERY MORNING *HOLD 1 DAY FOR LOOSE STOOLS* (CONSTIPATION) 510 g 10    simvastatin (ZOCOR) 20 mg tablet Take 1 tablet (20 mg total) by mouth daily with dinner at 4 pm for hyperlipidemia      Starch, Thickening, POWD Take by mouth daily Use on food and liquid as directed 1020 g 3    traZODone (DESYREL) 100 mg tablet Take 1 tablet (100 mg total) by mouth daily at  8 pm for depression  0    valACYclovir (VALTREX) 1,000 mg tablet Take 1 tablet (1,000 mg total) by mouth 3 (three) times a day for 7 days 21 tablet 0    valproic acid (DEPAKENE) 250 MG/5ML soln TAKE 2 TEASPOONSFUL (10ML) (500MG) BY MOUTH TWICE A DAY (BIPOLAR) 473 mL 10    Vitamins A & D (VITAMIN A & D) ointment Apply topically as needed for dry skin       No current facility-administered medications for this visit  Allergies   Allergen Reactions    Keflex [Cephalexin] GI Intolerance       Review of Systems   Unable to perform ROS: Patient nonverbal       Video Exam    Vitals:    01/18/22 1359   BP: 90/64   Temp: 97 7 °F (36 5 °C)     It was my intent to perform this visit via video technology but the patient was not able to do a video connection so the visit was completed via audio telephone only  I spent 12 minutes directly with the patient during this visit    [id number] [facility] verbally agrees to participate in [employer]  Pt is aware that [employer] could be limited without vital signs or the ability to perform a full hands-on physical [place] understands she or the provider may request at any time to terminate the video visit and request the patient to seek care or treatment in person

## 2022-01-21 ENCOUNTER — TELEMEDICINE (OUTPATIENT)
Dept: NEUROLOGY | Facility: CLINIC | Age: 62
End: 2022-01-21
Payer: MEDICARE

## 2022-01-21 VITALS
SYSTOLIC BLOOD PRESSURE: 101 MMHG | HEART RATE: 81 BPM | TEMPERATURE: 97.3 F | HEIGHT: 58 IN | BODY MASS INDEX: 22.04 KG/M2 | WEIGHT: 105 LBS | DIASTOLIC BLOOD PRESSURE: 53 MMHG

## 2022-01-21 DIAGNOSIS — G82.50 SPASTIC QUADRIPARESIS (HCC): ICD-10-CM

## 2022-01-21 DIAGNOSIS — G80.4 ATAXIC CEREBRAL PALSY (HCC): ICD-10-CM

## 2022-01-21 DIAGNOSIS — G20 PARKINSONISM (HCC): Primary | ICD-10-CM

## 2022-01-21 DIAGNOSIS — G20 PARKINSON'S DISEASE (HCC): ICD-10-CM

## 2022-01-21 PROBLEM — G20.C PARKINSONISM: Status: ACTIVE | Noted: 2022-01-21

## 2022-01-21 PROCEDURE — 99214 OFFICE O/P EST MOD 30 MIN: CPT | Performed by: NURSE PRACTITIONER

## 2022-01-21 PROCEDURE — 3008F BODY MASS INDEX DOCD: CPT | Performed by: FAMILY MEDICINE

## 2022-01-21 NOTE — PROGRESS NOTES
Virtual Regular Visit    Verification of patient location:    Patient is located in the following state in which I hold an active license [place]      Assessment/Plan:    Problem List Items Addressed This Visit        Nervous and Auditory    Cerebral palsy ([facility] )    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[facility] )    Parkinsonism ([facility] ) - Primary     Patient returns for follow-up for increased tremors and gait dysfunction progressively worsening over the last several months to 1 year  She was started on a trial of Sinemet due to suspicion for parkinsonism  In further discussion with staff her Abilify was also reduced by psych around the same time  Since then she did noticed improvement in her tremors as well as facial expressions and interaction with staff  She does continue with difficulty with her gait mainly in the form of freezing  She does continue to have significant spasticity  Exam was largely limited due to virtual platform however I did note oral dyskinesias which have worsened with the addition of Sinemet, as well as an intermittent jaw tremor  She did have a very mild postural and action tremor, however the staff noted that this was improved with recent medication changes  I did discuss with staff how Abilify could potentially be contributing to symptoms, however cannot entirely exclude an underlying condition      Plan:  -staff to discuss with psych at upcoming appointment if Abilify could be further reduced  -if unable to reduce, we could increase her Sinemet 25/100 milligram to 1 tab t i d , if no improvement in 1 week could further increase to 1 5 tabs t i d   We could also consider the addition of amantadine   -should follow up with PM&R regarding her spasticity, she has active referral                    Reason for visit is   Chief Complaint   Patient presents with    Virtual Regular Visit    Parkinson's Disease        Encounter provider LEATHA Noonan    Provider located at [facility] [place]  [address] [facility] [id number]      Recent Visits  No visits were found meeting these conditions  Showing recent visits within past 7 days and meeting all other requirements  Today's Visits  Date Type Provider Dept   01/21/22 Telemedicine Kathy Zuniga, [address] [place] [address] Drive today's visits and meeting all other requirements  Future Appointments  No visits were found meeting these conditions  Showing future appointments within next 150 days and meeting all other requirements       The patient was identified by name and date of birth  Hilary Irwin was informed that this is a telemedicine visit and that the visit is being conducted through [facility] and patient was informed this is a secure, HIPAA-complaint platform  She agrees to proceed     My office door was closed  No one else was in the room  She acknowledged consent and understanding of privacy and security of the video platform  The patient has agreed to participate and understands they can discontinue the visit at any time  Patient is aware this is a billable service  Subjective  Hilary Irwin is a 63 y o  female who present for follow up   HPI   Hilary Irwin is a 63 y o  female with a PMH of intellectual disability 2/2 cerebral palsy, diabetes, and dysphagia presenting for follow up  To review, she has followed with our epilepsy team since 2016 for history of seizure like episodes   She was last seen 10/2021 in which her largest concern was increased tremors, and gait changes, possible "freezing" Her exam did show significant spasticity  Symptoms worsening over italo past few months  She was started on a trial of sinemet, she was referred to PM&R due to spasticity  Current Medication:  abilify 20 mg QD  Sinemet 25/100 mg 1 tabs BID     Interval History:  She was in the ER late last month for a UTI which has since been treated     She presents today with one the nursing staff at [facility] facility [facility]   staff had noted decreased facial expression prior to last visit  she has had a tremor in the left hand for a number of years, noticed this worsening and more noticable when she was trying to eat, staff described it as hand flapping-they started to notice this after her hospitalization in sept as well  after she was hospitalized in sept for a UTI her gait has worsened  She used to require 1 assist to ambulate, now she requires 2 assist and she will freeze, utilizes a walker  No recent falls  Since starting sinemet facial expressions have improved, more verbal and interactive with staff, tremors improved  They were hesitant to take the medication more then BID as she attended day program prior to [place] who would not give meds  They do see a NP through psych who decreased her abilify just prior to her last appt here  She did start abilify for at the least the past 10 years if not more   She will be seeing a psychiatrist next month  She has a history of dysphagia and is on honey thick liquids with purees  She has a swallow study scheduled in the near future  She is sleeping well, no hallucinations, no dizziness  Gets complete assistance from staff for ADLs  They have not made appt with PM&R-was waiting to see us again and discuss with psych  She did have a CT head performed 8/2021 with no acute changes, however did show Decreased attenuation is noted in periventricular and subcortical white matter demonstrating an appearance that is statistically most likely to represent mild microangiopathic change; this appearance is similar when compared to most recent   prior examination    Chronic lacunar infarction(s) are noted in basal ganglia, unchanged from prior exam     Past Medical History:   Diagnosis Date    Adjustment disorder     Altered mental status 12/11/2015    Anemia     [facility] 1 [facility]     Cerebral palsy [facility] )     Chronic hypernatremia 2/6/2016  Closed fracture of left hip ([facility] ) 1/19/2016    Closed left hip fracture (HCC)     no surgery    Constipation     Dehydration 2/20/2016    Diabetes mellitus ([place] [address] )     Disease of thyroid gland     Diverticulosis     Fracture of multiple ribs of right side     Hip fracture (HCC) 07/26/2015    left    Hyperlipidemia     Hypernatremia 12/28/2018    Hypotension     Impulse control disorder     Incontinence     Intellectual disability due to developmental disorder, unspecified     Microalbuminuria     Osteopathia     Osteoporosis     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HCC)     Thrombocytopenia ([facility] ) 7/31/2015       Past Surgical History:   Procedure Laterality Date    ABDOMINAL SURGERY      COLECTOMY MIN      re-anastomosis 7/22/16    COLONOSCOPY N/A 7/21/2016    Procedure: COLONOSCOPY;  Surgeon: Elsa Jenkins MD;  [facility]; Service:     COLONOSCOPY N/A 1/31/2016    Procedure: COLONOSCOPY;  Surgeon: Elsa Jenkins MD;  [facility];  Service:     COLONOSCOPY N/A 7/25/2017    Procedure: COLONOSCOPY;  Surgeon: Elsa Jenkins MD;  [facility];   Service: Colorectal    COLOSTOMY      EXPLORATORY LAPAROTOMY W/ BOWEL RESECTION N/A 1/31/2016    Procedure: exploratory laparotomy, left sigmoidectomy, coloproctostomy, take down splenic flexure, loop colostomy;  Surgeon: Elsa Jenkins MD;  [facility];  Service:     OH CLOSE ENTEROSTOMY N/A 7/22/2016    Procedure: SEGMENTAL COLECTOMY WITH COLOCOLOSTOMY;  Surgeon: Elsa Jenkins MD;  [facility];  Service: Colorectal    UPPER GASTROINTESTINAL ENDOSCOPY         Current Outpatient Medications   Medication Sig Dispense Refill    acetaminophen (TYLENOL) 325 mg tablet TAKE 2 TABLETS BY MOUTH (650MG) EVERY 6 HOURS AS NEEDED FOR PAIN / FEVER GREATER THAN 100 4 10 tablet 11    ARIPiprazole (ABILIFY) 30 mg tablet Take 1 tablet (30 mg) by mouth daily at 8 pm (Patient taking differently: 20 mg Take 1 tablet (20 mg) by mouth daily at 8 pm)  0    baclofen 10 mg tablet Take 1 tablet (10 mg total) by mouth 3 (three) times a day 90 tablet 11    benzonatate (TESSALON PERLES) 100 mg capsule Take 1 capsule (100 mg total) by mouth every 8 (eight) hours as needed for cough 30 capsule 0    calcium carbonate (OS-MORRIS) 600 MG tablet Take 1 tablet (600 mg total) by mouth 2 (two) times a day with meals 60 tablet 2    carbamide peroxide (DEBROX) 6 5 % otic solution Administer 2 drops into both ears daily as needed 2 drops in the affected ear prn x 5 days      carbidopa-levodopa (Sinemet)  mg per tablet Take 1 tablet by mouth 2 (two) times a day 120 tablet 3    carboxymethylcellulose (REFRESH TEARS) 0 5 % SOLN 1 drop 3 (three) times a day as needed for dry eyes      Cholecalciferol (Vitamin D) 50 MCG (2000 UT) tablet Take 1 tablet (2,000 Units total) by mouth daily 30 tablet 5    Citalopram Hydrobromide (CELEXA PO) Take 40 mg by mouth every morning        denosumab (PROLIA) 60 mg/mL Inject 1 mL (60 mg total) under the skin once for 1 dose 1 mL 1    docosanol (ABREVA) 10 % Apply topically 5 (five) times a day Apply and rub in 5x a day until healed as needed for lesion  0    ferrous sulfate 324 (65 Fe) mg Take 325 mg by mouth 2 (two) times a day        fexofenadine (ALLEGRA) 180 MG tablet Take 180 mg by mouth as needed        fluticasone (FLONASE) 50 mcg/act nasal spray 1 spray into each nostril daily as needed for rhinitis or allergies 1 Bottle 1    guaiFENesin (ROBITUSSIN) 100 MG/5ML oral liquid Take 200 mg by mouth every 8 (eight) hours as needed 2 tsp every 8 hours as needed for cough      hydrocortisone 1 % cream Apply topically to affected area twice daily as needed for rash 30 g 0    levothyroxine (SYNTHROID) 25 mcg tablet Take 1 tablet (25 mcg) by mouth every morning  0    linaGLIPtin (Tradjenta) 5 MG TABS Take 5 mg by mouth daily      LORazepam (ATIVAN) 0 5 mg tablet Take 0 5 mg by mouth daily at bedtime  metFORMIN (GLUCOPHAGE) 1000 MG tablet Take 1 tablet (1,000 mg total) by mouth 2 (two) times a day with meals 60 tablet 2    neomycin-bacitracin-polymyxin b (NEOSPORIN) ointment Apply 1 application topically 2 (two) times a day as needed      nitrofurantoin (MACRODANTIN) 50 mg capsule Take 50 mg by mouth daily at bedtime      olopatadine HCl (PATADAY) 0 2 % opth drops Administer 1 drop to both eyes as needed      omeprazole (PriLOSEC) 20 mg delayed release capsule Take 1 capsule (20 mg total) by mouth 2 (two) times a day before meals 60 capsule 2    Oxybutynin Chloride (DITROPAN XL PO) Take 5 mg by mouth 3 (three) times a day       polyethylene glycol (GLYCOLAX) 17 GM/SCOOP powder DISSOLVE 17GM (1 CAPFUL) IN 8 OZ FLUIDS AND CONSUME BY MOUTH EVERY MORNING *HOLD 1 DAY FOR LOOSE STOOLS* (CONSTIPATION) 510 g 10    simvastatin (ZOCOR) 20 mg tablet Take 1 tablet (20 mg total) by mouth daily with dinner at 4 pm for hyperlipidemia      Starch, Thickening, POWD Take by mouth daily Use on food and liquid as directed 1020 g 3    traZODone (DESYREL) 100 mg tablet Take 1 tablet (100 mg total) by mouth daily at  8 pm for depression  0    valproic acid (DEPAKENE) 250 MG/5ML soln TAKE 2 TEASPOONSFUL (10ML) (500MG) BY MOUTH TWICE A DAY (BIPOLAR) 473 mL 10    Vitamins A & D (VITAMIN A & D) ointment Apply topically as needed for dry skin      linaGLIPtin 5 MG TABS Take 5 mg by mouth daily 90 tablet 0    valACYclovir (VALTREX) 1,000 mg tablet Take 1 tablet (1,000 mg total) by mouth 3 (three) times a day for 7 days (Patient not taking: Reported on 1/21/2022 ) 21 tablet 0     No current facility-administered medications for this visit  Allergies   Allergen Reactions    Keflex [Cephalexin] GI Intolerance     I have personally reviewed the ROS performed by the MA  Review of Systems   Constitutional: Negative  Negative for appetite change and fever  HENT: Negative    Negative for hearing loss, tinnitus, trouble swallowing and voice change  Eyes: Negative  Negative for photophobia and pain  Respiratory: Negative  Negative for shortness of breath  Cardiovascular: Negative  Negative for palpitations  Gastrointestinal: Negative  Negative for nausea and vomiting  Endocrine: Negative  Negative for cold intolerance  Genitourinary: Negative  Negative for dysuria, frequency and urgency  Musculoskeletal: Positive for gait problem (freezing, stiffness all over body  )  Negative for myalgias and neck pain  Skin: Negative  Negative for rash  Neurological: Positive for tremors (b/l hand)  Negative for dizziness, seizures, syncope, facial asymmetry, speech difficulty, weakness, light-headedness, numbness and headaches  Hematological: Negative  Does not bruise/bleed easily  Psychiatric/Behavioral: Negative  Negative for confusion, hallucinations and sleep disturbance  Video Exam    Vitals:    01/21/22 1028   BP: 101/53   Pulse: 81   Temp: (!) 97 3 °F (36 3 °C)   TempSrc: Temporal   Weight: 47 6 kg (105 lb)   Height: 4' 10" (1 473 m)       Physical Exam   Awake and alert  Able to follow simple commands intermittently  Hearing and facial sensation intact  No facial droop, able to puff out cheeks, tongue midline  Able to move all extremities antigravity  Oral dyskinesias noted as well as intermittent jaw tremor  Spasticity noted in all 4 extremities  Unable to perform CARLYLE, however mild postural and action tremor noted with FTN testing  Stood with assistance but was not ambulated  I spent 38 minutes directly with the patient during this visit    [id number] [facility] [address] verbally agrees to participate in [employer]   Pt is aware that [employer] could be limited without vital signs or the ability to perform a full hands-on physical Mara understands she or the provider may request at any time to terminate the video visit and request the patient to seek care or treatment in person

## 2022-01-21 NOTE — ASSESSMENT & PLAN NOTE
Patient returns for follow-up for increased tremors and gait dysfunction progressively worsening over the last several months to 1 year  She was started on a trial of Sinemet due to suspicion for parkinsonism  In further discussion with staff her Abilify was also reduced by psych around the same time  Since then she did noticed improvement in her tremors as well as facial expressions and interaction with staff  She does continue with difficulty with her gait mainly in the form of freezing  She does continue to have significant spasticity  Exam was largely limited due to virtual platform however I did note oral dyskinesias which have worsened with the addition of Sinemet, as well as an intermittent jaw tremor  She did have a very mild postural and action tremor, however the staff noted that this was improved with recent medication changes  I did discuss with staff how Abilify could potentially be contributing to symptoms, however cannot entirely exclude an underlying condition      Plan:  -staff to discuss with psych at upcoming appointment if Abilify could be further reduced  -if unable to reduce, we could increase her Sinemet 25/100 milligram to 1 tab t i d , if no improvement in 1 week could further increase to 1 5 tabs t i d   We could also consider the addition of amantadine   -should follow up with PM&R regarding her spasticity, she has active referral

## 2022-02-01 ENCOUNTER — HOSPITAL ENCOUNTER (OUTPATIENT)
Dept: RADIOLOGY | Facility: HOSPITAL | Age: 62
Discharge: HOME | End: 2022-02-01
Payer: MEDICARE

## 2022-02-01 DIAGNOSIS — R13.10 DYSPHAGIA, UNSPECIFIED TYPE: ICD-10-CM

## 2022-02-01 PROCEDURE — 92611 MOTION FLUOROSCOPY/SWALLOW: CPT

## 2022-02-01 PROCEDURE — 74230 X-RAY XM SWLNG FUNCJ C+: CPT

## 2022-02-01 NOTE — PROCEDURES
Video Swallow Study      Patient Name: Damaso Lopez  HQAKW'G Date: 2/1/2022        Past Medical History  Past Medical History:   Diagnosis Date    Adjustment disorder     Altered mental status 12/11/2015    Anemia     [facility]     Cerebral palsy ([facility] )     Chronic hypernatremia 2/6/2016    [facility] 1/19/2016    Closed left hip fracture (HCC)     no surgery    Constipation     Dehydration 2/20/2016    Diabetes mellitus ([facility] )     Disease of thyroid gland     Diverticulosis     Fracture of multiple ribs of right side     Hip fracture (HCC) 07/26/2015    left    Hyperlipidemia     Hypernatremia 12/28/2018    Hypotension     Impulse control disorder     Incontinence     Intellectual disability due to developmental disorder, unspecified     Microalbuminuria     Osteopathia     Osteoporosis     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HCC)     Thrombocytopenia ([facility] ) 7/31/2015        Past Surgical History  Past Surgical History:   Procedure Laterality Date    ABDOMINAL SURGERY      COLECTOMY MIN      re-anastomosis 7/22/16    COLONOSCOPY N/A 7/21/2016    Procedure: COLONOSCOPY;  Surgeon: Will Roche MD;  [facility]; Service:     COLONOSCOPY N/A 1/31/2016    Procedure: COLONOSCOPY;  Surgeon: Will Roche MD;  [facility];  Service:     COLONOSCOPY N/A 7/25/2017    Procedure: COLONOSCOPY;  Surgeon: Will Roche MD;  [facility];   Service: Colorectal    COLOSTOMY      EXPLORATORY LAPAROTOMY W/ BOWEL RESECTION N/A 1/31/2016    Procedure: exploratory laparotomy, left sigmoidectomy, coloproctostomy, take down splenic flexure, loop colostomy;  Surgeon: Will Roche MD;  [facility];  Service:     TX CLOSE ENTEROSTOMY N/A 7/22/2016    Procedure: SEGMENTAL COLECTOMY WITH COLOCOLOSTOMY;  Surgeon: Will Roche MD;  [facility];  Service: Colorectal    UPPER GASTROINTESTINAL ENDOSCOPY           General Information:    64 yo female referred to [facility]  for a VBS by Idania Chandler PA-C for safety/ability necessary for colonoscopy prep  She has a history of dysphagia and is currently on puree diet w/ HTL by teaspoon  Per last VBS on 10/29/2020, she has moderate oral/pharyngeal dysphagia characterized by slow manipulation of solids/soft solids, delayed transfer, and vallecular retention  She appeared at risk of aspiration due to penetration noted w/ NTL  Cognition:  Pt was alert  Little verbal output observed  Speech/Swallow Mech: Oral motor movements appeared  [facility]; Dentition was  edentulous; Cough was N/A  Respiratory Status: Room air;   Current diet: Puree and HTL via tsp     Prior VBS 10/29/2020 and was recommended to continue puree diet, but to advance to HTL by tsp  Continue w/ meds crushed in puree  Pt was seen in radiology for a Video Barium Swallow Study, seated in the upright position and viewed laterally with the following consistencies: puree, HTL via teaspoon, NTL via teaspoon and soft solid (nutrigrain bar)  Pt was fed by caregiver during evaluation  Results are as follows:     **Images are available for review on PACS          Oral Stage:   Pt presented w/ fair bolus retrieval and labial seal w/ a spoon and mild labial leakage was observed  Pt presented w/ oral holding w/ all consistencies, but especially w/ puree  Pt appeared to have decreased oral control w/ all consistencies administered, but moreso w/ NTL  Pt presented w/ decreased mastication and manipulation /bolus formation w/ soft solids  Bolus transfer appeared slightly delayed and no oral residue was observed  Pharyngeal Stage:   Swallow initiation appeared timely and complete w/ consistencies assessed  Soft palate elevation, laryngeal elevation, and anterior hyoid excursion appeared WFL   Tongue base retraction appeared adequate w/ little/no residue observed in vallecula  Pharyngeal constriction appeared adequate and no pharyngeal residue was observed after completion of swallow  Epiglottic inversion appeared adequate  Laryngeal vestibule closure appeared adequate, however premature spillage to the vocal cords x1 was observed w/ HTL via teaspoon and silent aspiration before the swallow was observed  W/ NTL via teaspoon, spillage to the pyriforms, leading to penetration x2 was observed  No other penetration or aspiration observed  Esophageal Stage:   Not assessed  Assessment Summary:   Pt continues to present w/ at least moderate oropharyngeal dysphagia characterized by decreased mastication abilities and decreased oral control w/ liquids  Aspiration was observed w/ HTL via teaspoon x1 before the swallow and inconsistent trace penetration w/ NTL by teaspoon  Diagnosis/Prognosis:            Recommendations:   Continue w/ current diet; puree and HTL by teaspoon     Continue meds crushed in applesauce   Reminders to position head in neutral position when eating/drinking

## 2022-02-28 ENCOUNTER — TELEPHONE (OUTPATIENT)
Dept: FAMILY MEDICINE CLINIC | Facility: CLINIC | Age: 62
End: 2022-02-28

## 2022-02-28 NOTE — TELEPHONE ENCOUNTER
Folder (To be completed by) - Dr Rupert Nicholson     Name of Form - forms for group home    Form to be Faxed (Fax #), Mailed (Address), or Picked up (By whom) -    call caregiver Pavel Arana to  [phone] (name/number on envelope)    Patient was made aware of the 7 business day form policy

## 2022-03-01 ENCOUNTER — TELEPHONE (OUTPATIENT)
Dept: FAMILY MEDICINE CLINIC | Facility: CLINIC | Age: 62
End: 2022-03-01

## 2022-03-02 ENCOUNTER — TELEPHONE (OUTPATIENT)
Dept: FAMILY MEDICINE CLINIC | Facility: CLINIC | Age: 62
End: 2022-03-02

## 2022-03-04 ENCOUNTER — TELEPHONE (OUTPATIENT)
Dept: OTHER | Facility: OTHER | Age: 62
End: 2022-03-04

## 2022-03-04 DIAGNOSIS — R13.10 DYSPHAGIA, UNSPECIFIED TYPE: Primary | ICD-10-CM

## 2022-03-04 NOTE — TELEPHONE ENCOUNTER
Called and spoke with patient's nurse  She was seen by [facility] in November and he was to discuss with Dr Ted Reese as to what type of prep to do  Patient's caregiver was told that she needed a swallow study to be performed before this could be decided  Patient had study done and is looking for direction on what to do and if she can even do this orally  Can someone please reach out to her nurse, Monika Bullard, to discuss? Her number is [phone]  Thanks!

## 2022-03-04 NOTE — TELEPHONE ENCOUNTER
Called rex without answer  Left voicemail   Appears patient will need inpatient admission with NG tube prep due to dysphagia

## 2022-03-04 NOTE — TELEPHONE ENCOUNTER
Appears patient will need inpatient admission for prep due to speech recommending only honey thick liquids  I do not believe there would be an alternative way to prep her except admission for NG tube prep  She has EGD schedule for 3/10  We may need to re-schedule if this needs to be coordinated with admission   Call rex back for any further updates once decided [phone]

## 2022-03-04 NOTE — TELEPHONE ENCOUNTER
Patient's nurse wanted to clarify instructions for bowel prep  Patient did a swallow study and has a colonoscopy coming up on 3/10   Patient sees Dr Bonifacio Perea

## 2022-03-07 NOTE — TELEPHONE ENCOUNTER
Direct admission order placed and sent to patient access bed planning so patient can be admitted on 3/9   LINDA will conference with Dr Jasen Rubio on 3/9 so patient can be admitted prior to 3/10 colonoscopy

## 2022-03-08 ENCOUNTER — TELEPHONE (OUTPATIENT)
Dept: GASTROENTEROLOGY | Facility: AMBULARY SURGERY CENTER | Age: 62
End: 2022-03-08

## 2022-03-08 NOTE — TELEPHONE ENCOUNTER
Patients GI provider:  Dr Thai Wilkins    Number to return call: (    [phone] or  [phone]    Reason for call: Her caregiver called to get the correct prep instructions, call was dropped, please assist    Scheduled procedure/appointment date if applicable: procedure   3-10-22

## 2022-03-08 NOTE — TELEPHONE ENCOUNTER
Patient is setup for SLIM admit tomorrow  Is the caregiver aware of this? Was this the ultimate plan? Please advise   Thanks!!

## 2022-03-08 NOTE — TELEPHONE ENCOUNTER
Patient's caregiver, Dania Fine, called back again  I informed her that she will be getting admitted tomorrow for prep  She asked about prep instructions  I told her that she would need to be on clear liquids tomorrow and the rest of the prep would take place at the hospital  Caregiver had questions about admission process and wanted to speak with you  Requested that you call her back at [phone]

## 2022-03-09 ENCOUNTER — HOSPITAL ENCOUNTER (OUTPATIENT)
Facility: HOSPITAL | Age: 62
Setting detail: OBSERVATION
Discharge: HOME | End: 2022-03-10
Attending: INTERNAL MEDICINE | Admitting: GENERAL PRACTICE
Payer: MEDICARE

## 2022-03-09 DIAGNOSIS — Z12.11 SCREENING FOR COLON CANCER: Primary | ICD-10-CM

## 2022-03-09 DIAGNOSIS — F31.9 BIPOLAR AFFECTIVE DISORDER, REMISSION STATUS UNSPECIFIED (HCC): ICD-10-CM

## 2022-03-09 LAB
ALBUMIN SERPL BCP-MCNC: 2.9 G/DL (ref 3.5–5)
ALP SERPL-CCNC: 45 U/L (ref 46–116)
ALT SERPL W P-5'-P-CCNC: 13 U/L (ref 12–78)
ANION GAP SERPL CALCULATED.3IONS-SCNC: 13 MMOL/L (ref 4–13)
AST SERPL W P-5'-P-CCNC: 9 U/L (ref 5–45)
BILIRUB SERPL-MCNC: 0.13 MG/DL (ref 0.2–1)
BUN SERPL-MCNC: 11 MG/DL (ref 5–25)
CALCIUM ALBUM COR SERPL-MCNC: 10.2 MG/DL (ref 8.3–10.1)
CALCIUM SERPL-MCNC: 9.3 MG/DL (ref 8.3–10.1)
CHLORIDE SERPL-SCNC: 102 MMOL/L (ref 100–108)
CO2 SERPL-SCNC: 30 MMOL/L (ref 21–32)
CREAT SERPL-MCNC: 0.87 MG/DL (ref 0.6–1.3)
ERYTHROCYTE [DISTWIDTH] IN BLOOD BY AUTOMATED COUNT: 12.5 % (ref 11.6–15.1)
EST. AVERAGE GLUCOSE BLD GHB EST-MCNC: 171 MG/DL
GFR SERPL CREATININE-BSD FRML MDRD: 73 ML/MIN/1.73SQ M
GLUCOSE SERPL-MCNC: 112 MG/DL (ref 65–140)
GLUCOSE SERPL-MCNC: 206 MG/DL (ref 65–140)
GLUCOSE SERPL-MCNC: 277 MG/DL (ref 65–140)
HBA1C MFR BLD: 7.6 %
HCT VFR BLD AUTO: 34.1 % (ref 34.8–46.1)
HGB BLD-MCNC: 11.3 G/DL (ref 11.5–15.4)
MCH RBC QN AUTO: 33.6 PG (ref 26.8–34.3)
MCHC RBC AUTO-ENTMCNC: 33.1 G/DL (ref 31.4–37.4)
MCV RBC AUTO: 102 FL (ref 82–98)
PLATELET # BLD AUTO: 150 THOUSANDS/UL (ref 149–390)
PMV BLD AUTO: 12.1 FL (ref 8.9–12.7)
POTASSIUM SERPL-SCNC: 3.9 MMOL/L (ref 3.5–5.3)
PROT SERPL-MCNC: 6.7 G/DL (ref 6.4–8.2)
RBC # BLD AUTO: 3.36 MILLION/UL (ref 3.81–5.12)
SODIUM SERPL-SCNC: 145 MMOL/L (ref 136–145)
WBC # BLD AUTO: 6.03 THOUSAND/UL (ref 4.31–10.16)

## 2022-03-09 PROCEDURE — 82948 REAGENT STRIP/BLOOD GLUCOSE: CPT

## 2022-03-09 PROCEDURE — 83036 HEMOGLOBIN GLYCOSYLATED A1C: CPT | Performed by: GENERAL PRACTICE

## 2022-03-09 PROCEDURE — 85027 COMPLETE CBC AUTOMATED: CPT | Performed by: GENERAL PRACTICE

## 2022-03-09 PROCEDURE — 99214 OFFICE O/P EST MOD 30 MIN: CPT | Performed by: INTERNAL MEDICINE

## 2022-03-09 PROCEDURE — 99219 PR INITIAL OBSERVATION CARE/DAY 50 MINUTES: CPT | Performed by: GENERAL PRACTICE

## 2022-03-09 PROCEDURE — 80053 COMPREHEN METABOLIC PANEL: CPT | Performed by: GENERAL PRACTICE

## 2022-03-09 PROCEDURE — G0379 DIRECT REFER HOSPITAL OBSERV: HCPCS

## 2022-03-09 RX ORDER — LEVOTHYROXINE SODIUM 0.03 MG/1
25 TABLET ORAL
Status: DISCONTINUED | OUTPATIENT
Start: 2022-03-10 | End: 2022-03-10 | Stop reason: HOSPADM

## 2022-03-09 RX ORDER — CITALOPRAM 20 MG/1
40 TABLET ORAL EVERY MORNING
Status: DISCONTINUED | OUTPATIENT
Start: 2022-03-10 | End: 2022-03-10 | Stop reason: HOSPADM

## 2022-03-09 RX ORDER — OXYBUTYNIN CHLORIDE 5 MG/1
5 TABLET ORAL 3 TIMES DAILY
Status: DISCONTINUED | OUTPATIENT
Start: 2022-03-09 | End: 2022-03-10 | Stop reason: HOSPADM

## 2022-03-09 RX ORDER — ARIPIPRAZOLE 5 MG/1
10 TABLET ORAL
Status: DISCONTINUED | OUTPATIENT
Start: 2022-03-09 | End: 2022-03-10 | Stop reason: HOSPADM

## 2022-03-09 RX ORDER — LORAZEPAM 0.5 MG/1
0.5 TABLET ORAL
Status: DISCONTINUED | OUTPATIENT
Start: 2022-03-09 | End: 2022-03-10 | Stop reason: HOSPADM

## 2022-03-09 RX ORDER — TRAZODONE HYDROCHLORIDE 100 MG/1
100 TABLET ORAL
Status: DISCONTINUED | OUTPATIENT
Start: 2022-03-09 | End: 2022-03-10 | Stop reason: HOSPADM

## 2022-03-09 RX ORDER — BACLOFEN 10 MG/1
10 TABLET ORAL 3 TIMES DAILY
Status: DISCONTINUED | OUTPATIENT
Start: 2022-03-09 | End: 2022-03-10 | Stop reason: HOSPADM

## 2022-03-09 RX ORDER — CALCIUM CARBONATE 500(1250)
1 TABLET ORAL 2 TIMES DAILY WITH MEALS
Status: DISCONTINUED | OUTPATIENT
Start: 2022-03-09 | End: 2022-03-10 | Stop reason: HOSPADM

## 2022-03-09 RX ORDER — PRAVASTATIN SODIUM 40 MG
40 TABLET ORAL
Status: DISCONTINUED | OUTPATIENT
Start: 2022-03-09 | End: 2022-03-10 | Stop reason: HOSPADM

## 2022-03-09 RX ORDER — DIVALPROEX SODIUM 125 MG/1
500 CAPSULE, COATED PELLETS ORAL EVERY 12 HOURS SCHEDULED
Status: DISCONTINUED | OUTPATIENT
Start: 2022-03-09 | End: 2022-03-10 | Stop reason: HOSPADM

## 2022-03-09 RX ADMIN — POLYETHYLENE GLYCOL 3350, SODIUM SULFATE ANHYDROUS, SODIUM BICARBONATE, SODIUM CHLORIDE, POTASSIUM CHLORIDE 4000 ML: 236; 22.74; 6.74; 5.86; 2.97 POWDER, FOR SOLUTION ORAL at 15:51

## 2022-03-09 RX ADMIN — Medication 20 MG: at 17:44

## 2022-03-09 RX ADMIN — OXYBUTYNIN CHLORIDE 5 MG: 5 TABLET ORAL at 21:27

## 2022-03-09 RX ADMIN — LORAZEPAM 0.5 MG: 0.5 TABLET ORAL at 21:27

## 2022-03-09 RX ADMIN — CARBIDOPA AND LEVODOPA 1 TABLET: 25; 100 TABLET ORAL at 21:27

## 2022-03-09 RX ADMIN — OXYBUTYNIN CHLORIDE 5 MG: 5 TABLET ORAL at 16:37

## 2022-03-09 RX ADMIN — TRAZODONE HYDROCHLORIDE 100 MG: 100 TABLET ORAL at 21:27

## 2022-03-09 RX ADMIN — DIVALPROEX SODIUM 500 MG: 125 CAPSULE, COATED PELLETS ORAL at 21:28

## 2022-03-09 RX ADMIN — PRAVASTATIN SODIUM 40 MG: 40 TABLET ORAL at 16:37

## 2022-03-09 RX ADMIN — INSULIN LISPRO 1 UNITS: 100 INJECTION, SOLUTION INTRAVENOUS; SUBCUTANEOUS at 17:44

## 2022-03-09 RX ADMIN — BACLOFEN 10 MG: 10 TABLET ORAL at 16:38

## 2022-03-09 RX ADMIN — BACLOFEN 10 MG: 10 TABLET ORAL at 21:27

## 2022-03-09 RX ADMIN — CALCIUM 1 TABLET: 500 TABLET ORAL at 16:38

## 2022-03-09 RX ADMIN — ARIPIPRAZOLE 10 MG: 5 TABLET ORAL at 21:27

## 2022-03-09 NOTE — CONSULTS
Consultation - [facility]) [facility]  Johnny Castelan 63 y o  female MRN: [id number]  Unit/Bed#: S -90 Encounter: [id number]        Inpatient consult to gastroenterology  Consult performed by: Cecilio Toribio PA-C  Consult ordered by: Juanjo Watt DO          Reason for Consult / Principal Problem: colon cancer screening, dysphagia    ASSESSMENT and PLAN:    Principal Problem:    Dysphagia  Active Problems:    Bipolar disorder ([facility] )    Type 2 diabetes mellitus, without long-term current use of insulin ([facility] )    Screening for colon cancer    Parkinsonism ([place] [address] )    #1  Screening for colon cancer: last colonoscopy in 2017 with poor prep, on honey thick liquids  -NGT placement  -golytley prep  -honey thick liquids today, no purees  -NPO at midnight  -plan for colonoscopy tomorrow as scheduled    -------------------------------------------------------------------------------------------------------------------    HPI: This is a 57-year-old female with history of cerebral palsy, diabetes, oropharyngeal dysphagia on honey thick liquids and purees who presents for inpatient bowel prep for colonoscopy tomorrow  Patient is nonverbal  She was seen in our office a year ago for colorectal cancer screening, follow-up of dysphagia although EGD in October 2020 had only showed moderate gastritis with no esophageal stricture/narrowing or other luminal lesions, with pathology negative for EOE   Her last colonoscopy was done in 2017 but she had been recommended for repeat colonoscopy in 3 year interval as her prep was noted to be fairly poor         REVIEW OF SYSTEMS: unable to be performed as patient is nonverbal      Historical Information   Past Medical History:   Diagnosis Date    Adjustment disorder     Altered mental status 12/11/2015    Anemia     Bipolar 1 disorder ([facility]     C[facility]     Chronic hypernatremia 2/6/2016    Closed fracture of left hip ([facility] ) 1/19/2016    Closed left hip fracture [facility])     no surgery    Constipation     Dehydration 2/20/2016    Diabetes mellitus ([facility] )     Disease of thyroid gland     Diverticulosis     Fracture of multiple ribs of right side     Hip fracture (HCC) 07/26/2015    left    Hyperlipidemia     Hypernatremia 12/28/2018    Hypotension     Impulse control disorder     Incontinence     Intellectual disability due to developmental disorder, unspecified     Microalbuminuria     Osteopathia     Osteoporosis     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HCC)     Thrombocytopenia ([facility] ) 7/31/2015     Past Surgical History:   Procedure Laterality Date    ABDOMINAL SURGERY      COLECTOMY MIN      re-anastomosis 7/22/16    COLONOSCOPY N/A 7/21/2016    Procedure: COLONOSCOPY;  Surgeon: Will Roche MD;  [facility]; Service:     COLONOSCOPY N/A 1/31/2016    Procedure: COLONOSCOPY;  Surgeon: Will Roche MD;  [facility];  Service:     COLONOSCOPY N/A 7/25/2017    Procedure: COLONOSCOPY;  Surgeon: Will Roche MD;  [facility];   Service: Colorectal    COLOSTOMY      EXPLORATORY LAPAROTOMY W/ BOWEL RESECTION N/A 1/31/2016    Procedure: exploratory laparotomy, left sigmoidectomy, coloproctostomy, take down splenic flexure, loop colostomy;  Surgeon: Will Roche MD;  [facility];  Service:     RI CLOSE ENTEROSTOMY N/A 7/22/2016    Procedure: SEGMENTAL COLECTOMY WITH COLOCOLOSTOMY;  Surgeon: Will Roche MD;  [facility];  Service: Colorectal    UPPER GASTROINTESTINAL ENDOSCOPY       Social History   Social History     Substance and Sexual Activity   Alcohol Use Not Currently     Social History     Substance and Sexual Activity   Drug Use No     Social History     Tobacco Use   Smoking Status Never Smoker   Smokeless Tobacco Never Used     Family History   Problem Relation Age of Onset    Alcohol abuse Mother     Alcohol abuse Father     Diabetes Sister     No Known Problems Sister     No Known Problems Sister        Meds/Allergies     Medications Prior to Admission   Medication    acetaminophen (TYLENOL) 325 mg tablet    ARIPiprazole (ABILIFY) 30 mg tablet    baclofen 10 mg tablet    benzonatate (TESSALON PERLES) 100 mg capsule    calcium carbonate (OS-MORRIS) 600 MG tablet    carbamide peroxide (DEBROX) 6 5 % otic solution    carbidopa-levodopa (Sinemet)  mg per tablet    carboxymethylcellulose (REFRESH TEARS) 0 5 % SOLN    Cholecalciferol (Vitamin D) 50 MCG (2000 UT) tablet    Citalopram Hydrobromide (CELEXA PO)    denosumab (PROLIA) 60 mg/mL    docosanol (ABREVA) 10 %    ferrous sulfate 324 (65 Fe) mg    fexofenadine (ALLEGRA) 180 MG tablet    fluticasone (FLONASE) 50 mcg/act nasal spray    guaiFENesin (ROBITUSSIN) 100 MG/5ML oral liquid    hydrocortisone 1 % cream    levothyroxine (SYNTHROID) 25 mcg tablet    linaGLIPtin (Tradjenta) 5 MG TABS    linaGLIPtin 5 MG TABS    LORazepam (ATIVAN) 0 5 mg tablet    metFORMIN (GLUCOPHAGE) 1000 MG tablet    neomycin-bacitracin-polymyxin b (NEOSPORIN) ointment    nitrofurantoin (MACRODANTIN) 50 mg capsule    olopatadine HCl (PATADAY) 0 2 % opth drops    omeprazole (PriLOSEC) 20 mg delayed release capsule    Oxybutynin Chloride (DITROPAN XL PO)    polyethylene glycol (GLYCOLAX) 17 GM/SCOOP powder    simvastatin (ZOCOR) 20 mg tablet    Starch, Thickening, POWD    traZODone (DESYREL) 100 mg tablet    valACYclovir (VALTREX) 1,000 mg tablet    valproic acid (DEPAKENE) 250 MG/5ML soln    Vitamins A & D (VITAMIN A & D) ointment     Current Facility-Administered Medications   Medication Dose Route Frequency    polyethylene glycol (GOLYTELY) bowel prep 4,000 mL  4,000 mL Oral Once       Allergies   Allergen Reactions    Keflex [Cephalexin] GI Intolerance           Objective     Last menstrual period 11/29/2009, not currently breastfeeding      No intake or output data in the 24 hours ending 03/09/22 7944      PHYSICAL EXAM: General Appearance:   Alert, cooperative, no distress, appears stated age; nonverbal    HEENT:   Normocephalic, atraumatic, anicteric, no oropharyngeal thrush present      Neck:  Supple, symmetrical, trachea midline, no adenopathy;    thyroid: no enlargement/tenderness/nodules; no carotid  bruit or JVD    Lungs:   Clear to auscultation bilaterally; no rales, rhonchi or wheezing; respirations unlabored    Heart[de-identified]   S1 and S2 normal; regular rate and rhythm; no murmur, rub, or gallop  Abdomen:   Soft, non-tender, non-distended; normal bowel sounds; no masses, no organomegaly    Genitalia:   Deferred    Rectal:   Deferred    Extremities:  No cyanosis, clubbing or edema    Pulses:  2+ and symmetric all extremities    Skin:  Skin color, texture, turgor normal, no rashes or lesions    Lymph nodes:  No palpable cervical, axillary or inguinal lymphadenopathy        Lab Results:             Invalid input(s): LABALBU            Imaging Studies: I have personally reviewed pertinent imaging studies  No results found  Patient was seen and examined by Dr Chacorta Aquino  All alicea medical decisions were made by Dr Chacorta Aquino  Thank you for allowing us to participate in the care of this present patient  We will follow-up with you closely

## 2022-03-09 NOTE — H&P
[address] 1960, 63 y o  female MRN: [id number]  Unit/Bed#: S -90 Encounter: [id number]  Primary Care Provider: Mary Kerr DO   Date and time admitted to hospital: 3/9/2022  2:10 PM    * Dysphagia  Assessment & Plan  · On puree w/ HTL at home  · Needs NGT for colon prep    Parkinsonism ([address] )  Assessment & Plan  · Sinemet    Screening for colon cancer  Assessment & Plan  · Colonoscopy tomorrow    Type 2 diabetes mellitus, without long-term current use of insulin (HCC)  Assessment & Plan  Lab Results   Component Value Date    HGBA1C 7 3 (A) 12/02/2021       No results for input(s): POCGLU in the last 72 hours  Blood Sugar Average: Last 72 hrs:     · Hold home meds  · ISS  · Check A1C    Severe Intellectual disability  Assessment & Plan  · Nonverbal    Bipolar disorder (HCC)  Assessment & Plan  · C/w Depakote, Abilify, Celexa, Trazadone, and Ativan  · Abilify dose being tapered - now at 10 mg qhs    Recurrent UTI (urinary tract infection)  Assessment & Plan  · Restart prophylactic nitrofurantoin at d/c - cannot give IP as we only have capsules which cannot be given through NGT      VTE Pharmacologic Prophylaxis: VTE Score: 1 Low Risk (Score 0-2) - Encourage Ambulation  Code Status: Level 1 - Full Code   Discussion with family: Updated  (caregiver - RN of care facility) at bedside  Anticipated Length of Stay: Patient will be admitted on an observation basis with an anticipated length of stay of less than 2 midnights secondary to need for NGT for colon prep  Total Time for Visit, including Counseling / Coordination of Care: 45 minutes Greater than 50% of this total time spent on direct patient counseling and coordination of care      Chief Complaint: needs NGT for colon prep    History of Present Illness:  Carri Meade is a 63 y o  female with a PMH of sigmoid volvulus and ID nonverbal presents with need to get colon prep through NGT due to dysphagia  Per caregiver, pt at baseline  No fevers  Took her meds this AM     Review of Systems:  Review of Systems   Unable to perform ROS: Patient nonverbal       Past Medical and Surgical History:   Past Medical History:   Diagnosis Date    Adjustment disorder     Altered mental status 12/11/2015    Anemia     Bipolar 1 disorder ([facility] )     Cerebral palsy ([place] [address] )     Chronic hypernatremia 2/6/2016    Closed fracture of left hip ([facility] ) 1/19/2016    Closed left hip fracture (HCC)     no surgery    Constipation     Dehydration 2/20/2016    Diabetes mellitus ([place] [address] )     Disease of thyroid gland     Diverticulosis     Fracture of multiple ribs of right side     Hip fracture ([place] [address] ) 07/26/2015    left    Hyperlipidemia     Hypernatremia 12/28/2018    Hypotension     Impulse control disorder     Incontinence     Intellectual disability due to developmental disorder, unspecified     Microalbuminuria     Osteopathia     Osteoporosis     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[place] [address] )     Thrombocytopenia ([place] [address] ) 7/31/2015       Past Surgical History:   Procedure Laterality Date    ABDOMINAL SURGERY      COLECTOMY MIN      re-anastomosis 7/22/16    COLONOSCOPY N/A 7/21/2016    Procedure: COLONOSCOPY;  Surgeon: Dionna Gomez MD;  [facility]; Service:     COLONOSCOPY N/A 1/31/2016    Procedure: COLONOSCOPY;  Surgeon: Dionna Gomez MD;  [facility];  Service:     COLONOSCOPY N/A 7/25/2017    Procedure: COLONOSCOPY;  Surgeon: Dionna Gomez MD;  [facility];   Service: Colorectal    COLOSTOMY      EXPLORATORY LAPAROTOMY W/ BOWEL RESECTION N/A 1/31/2016    Procedure: exploratory laparotomy, left sigmoidectomy, coloproctostomy, take down splenic flexure, loop colostomy;  Surgeon: Dionna Gomez MD;  [facility];  Service:     WI CLOSE ENTEROSTOMY N/A 7/22/2016    Procedure: SEGMENTAL COLECTOMY WITH COLOCOLOSTOMY;  Surgeon: Dionna Gomez MD;  [facility];  Service: Colorectal    UPPER GASTROINTESTINAL ENDOSCOPY         Meds/Allergies:  Prior to Admission medications    Medication Sig Start Date End Date Taking? Authorizing Provider   acetaminophen (TYLENOL) 325 mg tablet TAKE 2 TABLETS BY MOUTH (650MG) EVERY 6 HOURS AS NEEDED FOR PAIN / FEVER GREATER THAN 100 4 6/11/21   Sofie Frank DO   ARIPiprazole (ABILIFY) 30 mg tablet Take 1 tablet (30 mg) by mouth daily at 8 pm  Patient taking differently: 20 mg Take 1 tablet (20 mg) by mouth daily at 8 pm 10/2/18   Janette Flores MD   baclofen 10 mg tablet Take 1 tablet (10 mg total) by mouth 3 (three) times a day 10/19/21   María Parsons MD   benzonatate (TESSALON PERLES) 100 mg capsule Take 1 capsule (100 mg total) by mouth every 8 (eight) hours as needed for cough 12/22/18   Gina Krause MD   calcium carbonate (OS-MORRIS) 600 MG tablet Take 1 tablet (600 mg total) by mouth 2 (two) times a day with meals 6/11/20   Regina Armenta MD   carbamide peroxide (DEBROX) 6 5 % otic solution Administer 2 drops into both ears daily as needed 2 drops in the affected ear prn x 5 days    Historical Provider, MD   carbidopa-levodopa (Sinemet)  mg per tablet Take 1 tablet by mouth 2 (two) times a day 10/18/21   María Parsons MD   carboxymethylcellulose (REFRESH TEARS) 0 5 % SOLN 1 drop 3 (three) times a day as needed for dry eyes    Historical Provider, MD   Citalopram Hydrobromide (CELEXA PO) Take 40 mg by mouth every morning      Historical Provider, MD   denosumab (PROLIA) 60 mg/mL Inject 1 mL (60 mg total) under the skin once for 1 dose 12/2/21 1/21/22  Sofie Frank DO   docosanol (ABREVA) 10 % Apply topically 5 (five) times a day Apply and rub in 5x a day until healed as needed for lesion 10/2/18   Janette Flores MD   ferrous sulfate 324 (65 Fe) mg Take 325 mg by mouth 2 (two) times a day      Historical Provider, MD   fexofenadine (ALLEGRA) 180 MG tablet Take 180 mg by mouth as needed      Historical Provider, MD   fluticasone (FLONASE) 50 mcg/act nasal spray 1 spray into each nostril daily as needed for rhinitis or allergies 4/7/20   Belkys Arce MD   guaiFENesin (ROBITUSSIN) 100 MG/5ML oral liquid Take 200 mg by mouth every 8 (eight) hours as needed 2 tsp every 8 hours as needed for cough    Historical Provider, MD   hydrocortisone 1 % cream Apply topically to affected area twice daily as needed for rash 6/11/20   Belkys Arce MD   levothyroxine (SYNTHROID) 25 mcg tablet Take 1 tablet (25 mcg) by mouth every morning 10/2/18   Jasmine Leung MD   linaGLIPtin (Tradjenta) 5 MG TABS Take 5 mg by mouth daily    Historical Provider, MD   linaGLIPtin 5 MG TABS Take 5 mg by mouth daily 6/25/21 9/23/21  Dee Burns MD   LORazepam (ATIVAN) 0 5 mg tablet Take 0 5 mg by mouth daily at bedtime      Historical Provider, MD   metFORMIN (GLUCOPHAGE) 1000 MG tablet Take 1 tablet (1,000 mg total) by mouth 2 (two) times a day with meals 12/20/19   Belkys Arce MD   neomycin-bacitracin-polymyxin b (NEOSPORIN) ointment Apply 1 application topically 2 (two) times a day as needed    Historical Provider, MD   nitrofurantoin (MACRODANTIN) 50 mg capsule Take 50 mg by mouth daily at bedtime    Historical Provider, MD   olopatadine HCl (PATADAY) 0 2 % opth drops Administer 1 drop to both eyes as needed    Historical Provider, MD   omeprazole (PriLOSEC) 20 mg delayed release capsule Take 1 capsule (20 mg total) by mouth 2 (two) times a day before meals 11/3/20 1/21/22  Yvette Burk PA-C   Oxybutynin Chloride (DITROPAN XL PO) Take 5 mg by mouth 3 (three) times a day     Historical Provider, MD   polyethylene glycol (GLYCOLAX) 17 GM/SCOOP powder DISSOLVE 17GM (1 CAPFUL) IN 8 OZ FLUIDS AND CONSUME BY MOUTH EVERY MORNING *HOLD 1 DAY FOR LOOSE STOOLS* (CONSTIPATION) 12/4/20   Marvin Haskins DO   simvastatin (ZOCOR) 20 mg tablet Take 1 tablet (20 mg total) by mouth daily with dinner at 4 pm for hyperlipidemia 9/18/20 Mary Ayala MD   Starch, Thickening, POWD Take by mouth daily Use on food and liquid as directed 11/17/20   Benjie Waite DO   traZODone (DESYREL) 100 mg tablet Take 1 tablet (100 mg total) by mouth daily at  8 pm for depression 10/2/18   Darrell Kent MD   valACYclovir (VALTREX) 1,000 mg tablet Take 1 tablet (1,000 mg total) by mouth 3 (three) times a day for 7 days  Patient not taking: Reported on 1/21/2022 6/2/21 6/9/21  John Ling MD   valproic acid (DEPAKENE) 250 MG/5ML soln TAKE 2 TEASPOONSFUL (10ML) (500MG) BY MOUTH TWICE A DAY (BIPOLAR) 12/9/21   Benjie Waite DO   Vitamins A & D (VITAMIN A & D) ointment Apply topically as needed for dry skin    Historical Provider, MD   Cholecalciferol (Vitamin D) 50 MCG (2000 UT) tablet Take 1 tablet (2,000 Units total) by mouth daily 8/6/20 3/9/22  Benjie Waite DO     I have reveiwed home medications using records provided by [facility]  Allergies: Allergies   Allergen Reactions    Keflex [Cephalexin] GI Intolerance       Social History:  Marital Status: Single     Patient Pre-hospital Living Situation: Intermediate care facility  Patient Pre-hospital Level of Mobility: unable to be assessed at time of evaluation    Substance Use History:   Social History     Substance and Sexual Activity   Alcohol Use Not Currently     Social History     Tobacco Use   Smoking Status Never Smoker   Smokeless Tobacco Never Used     Social History     Substance and Sexual Activity   Drug Use No       Family History:  Family History   Problem Relation Age of Onset    Alcohol abuse Mother     Alcohol abuse Father     Diabetes Sister     No Known Problems Sister     No Known Problems Sister        Physical Exam:     Vitals:   Blood Pressure: 107/65 (03/09/22 1454)  Pulse: 70 (03/09/22 1454)  Temperature: 97 6 °F (36 4 °C) (03/09/22 1454)  Respirations: 17 (03/09/22 1454)  SpO2: 92 % (03/09/22 1454)    Physical Exam  HENT:      Head: Normocephalic and atraumatic        Nose: Nose normal       Mouth/Throat:      Mouth: Mucous membranes are moist    Eyes:      Extraocular Movements: Extraocular movements intact  Conjunctiva/sclera: Conjunctivae normal    Cardiovascular:      Rate and Rhythm: Normal rate and regular rhythm  Pulmonary:      Effort: Pulmonary effort is normal       Breath sounds: Normal breath sounds  No wheezing or rales  Abdominal:      General: Bowel sounds are normal  There is no distension  Palpations: Abdomen is soft  Tenderness: There is no abdominal tenderness  Musculoskeletal:         General: Normal range of motion  Cervical back: Normal range of motion and neck supple  Right lower leg: No edema  Left lower leg: No edema  Skin:     General: Skin is warm and dry  Neurological:      Mental Status: She is alert  Comments: nonverbal         Additional Data:     Lab Results:  Results from last 7 days   Lab Units 03/09/22  1443   WBC Thousand/uL 6 03   HEMOGLOBIN g/dL 11 3*   HEMATOCRIT % 34 1*   PLATELETS Thousands/uL 150                           Imaging: No pertinent imaging reviewed  No orders to display       EKG and Other Studies Reviewed on Admission:   · EKG: No EKG obtained  ** Please Note: This note has been constructed using a voice recognition system   **

## 2022-03-09 NOTE — ASSESSMENT & PLAN NOTE
· Restart prophylactic nitrofurantoin at d/c - cannot give IP as we only have capsules which cannot be given through NGT

## 2022-03-09 NOTE — ASSESSMENT & PLAN NOTE
· C/w Depakote, Abilify, Celexa, Trazadone, and Ativan  · Abilify dose being tapered - now at 10 mg qhs

## 2022-03-09 NOTE — ASSESSMENT & PLAN NOTE
Lab Results   Component Value Date    HGBA1C 7 3 (A) 12/02/2021       No results for input(s): POCGLU in the last 72 hours      Blood Sugar Average: Last 72 hrs:     · Hold home meds  · ISS  · Check A1C

## 2022-03-10 ENCOUNTER — HOSPITAL ENCOUNTER (OUTPATIENT)
Dept: GASTROENTEROLOGY | Facility: HOSPITAL | Age: 62
Setting detail: OUTPATIENT SURGERY
Discharge: HOME | End: 2022-03-10
Attending: INTERNAL MEDICINE | Admitting: INTERNAL MEDICINE
Payer: MEDICARE

## 2022-03-10 ENCOUNTER — ANESTHESIA EVENT (OUTPATIENT)
Dept: GASTROENTEROLOGY | Facility: HOSPITAL | Age: 62
End: 2022-03-10
Payer: MEDICARE

## 2022-03-10 ENCOUNTER — TRANSITIONAL CARE MANAGEMENT (OUTPATIENT)
Dept: FAMILY MEDICINE CLINIC | Facility: CLINIC | Age: 62
End: 2022-03-10

## 2022-03-10 ENCOUNTER — ANESTHESIA (OUTPATIENT)
Dept: GASTROENTEROLOGY | Facility: HOSPITAL | Age: 62
End: 2022-03-10
Payer: MEDICARE

## 2022-03-10 VITALS
HEART RATE: 52 BPM | RESPIRATION RATE: 16 BRPM | OXYGEN SATURATION: 100 % | TEMPERATURE: 97.8 F | DIASTOLIC BLOOD PRESSURE: 53 MMHG | SYSTOLIC BLOOD PRESSURE: 86 MMHG

## 2022-03-10 VITALS
RESPIRATION RATE: 16 BRPM | HEART RATE: 60 BPM | TEMPERATURE: 98 F | DIASTOLIC BLOOD PRESSURE: 52 MMHG | OXYGEN SATURATION: 97 % | SYSTOLIC BLOOD PRESSURE: 101 MMHG

## 2022-03-10 DIAGNOSIS — K56.2 SIGMOID VOLVULUS (HCC): ICD-10-CM

## 2022-03-10 DIAGNOSIS — K59.00 CONSTIPATION, UNSPECIFIED CONSTIPATION TYPE: ICD-10-CM

## 2022-03-10 DIAGNOSIS — Z12.11 SCREENING FOR COLON CANCER: ICD-10-CM

## 2022-03-10 LAB
ANION GAP SERPL CALCULATED.3IONS-SCNC: 9 MMOL/L (ref 4–13)
BUN SERPL-MCNC: 9 MG/DL (ref 5–25)
CALCIUM SERPL-MCNC: 9.6 MG/DL (ref 8.3–10.1)
CHLORIDE SERPL-SCNC: 108 MMOL/L (ref 100–108)
CO2 SERPL-SCNC: 32 MMOL/L (ref 21–32)
CREAT SERPL-MCNC: 0.86 MG/DL (ref 0.6–1.3)
ERYTHROCYTE [DISTWIDTH] IN BLOOD BY AUTOMATED COUNT: 12.3 % (ref 11.6–15.1)
GFR SERPL CREATININE-BSD FRML MDRD: 74 ML/MIN/1.73SQ M
GLUCOSE P FAST SERPL-MCNC: 147 MG/DL (ref 65–99)
GLUCOSE SERPL-MCNC: 147 MG/DL (ref 65–140)
GLUCOSE SERPL-MCNC: 151 MG/DL (ref 65–140)
GLUCOSE SERPL-MCNC: 151 MG/DL (ref 65–140)
HCT VFR BLD AUTO: 34.4 % (ref 34.8–46.1)
HGB BLD-MCNC: 11.7 G/DL (ref 11.5–15.4)
MAGNESIUM SERPL-MCNC: 1.8 MG/DL (ref 1.6–2.6)
MCH RBC QN AUTO: 34.1 PG (ref 26.8–34.3)
MCHC RBC AUTO-ENTMCNC: 34 G/DL (ref 31.4–37.4)
MCV RBC AUTO: 100 FL (ref 82–98)
PHOSPHATE SERPL-MCNC: 3.3 MG/DL (ref 2.7–4.5)
PLATELET # BLD AUTO: 151 THOUSANDS/UL (ref 149–390)
PMV BLD AUTO: 11.7 FL (ref 8.9–12.7)
POTASSIUM SERPL-SCNC: 4.5 MMOL/L (ref 3.5–5.3)
RBC # BLD AUTO: 3.43 MILLION/UL (ref 3.81–5.12)
SODIUM SERPL-SCNC: 149 MMOL/L (ref 136–145)
WBC # BLD AUTO: 7.34 THOUSAND/UL (ref 4.31–10.16)

## 2022-03-10 PROCEDURE — 85027 COMPLETE CBC AUTOMATED: CPT | Performed by: GENERAL PRACTICE

## 2022-03-10 PROCEDURE — 99217 PR OBSERVATION CARE DISCHARGE MANAGEMENT: CPT | Performed by: INTERNAL MEDICINE

## 2022-03-10 PROCEDURE — 80048 BASIC METABOLIC PNL TOTAL CA: CPT | Performed by: GENERAL PRACTICE

## 2022-03-10 PROCEDURE — G0121 COLON CA SCRN NOT HI RSK IND: HCPCS | Performed by: INTERNAL MEDICINE

## 2022-03-10 PROCEDURE — 83735 ASSAY OF MAGNESIUM: CPT | Performed by: GENERAL PRACTICE

## 2022-03-10 PROCEDURE — 84100 ASSAY OF PHOSPHORUS: CPT | Performed by: GENERAL PRACTICE

## 2022-03-10 PROCEDURE — 82948 REAGENT STRIP/BLOOD GLUCOSE: CPT

## 2022-03-10 RX ORDER — SODIUM CHLORIDE, SODIUM LACTATE, POTASSIUM CHLORIDE, CALCIUM CHLORIDE 600; 310; 30; 20 MG/100ML; MG/100ML; MG/100ML; MG/100ML
INJECTION, SOLUTION INTRAVENOUS CONTINUOUS PRN
Status: DISCONTINUED | OUTPATIENT
Start: 2022-03-10 | End: 2022-03-10

## 2022-03-10 RX ORDER — DEXTROSE MONOHYDRATE 50 MG/ML
75 INJECTION, SOLUTION INTRAVENOUS CONTINUOUS
Status: DISCONTINUED | OUTPATIENT
Start: 2022-03-10 | End: 2022-03-10 | Stop reason: HOSPADM

## 2022-03-10 RX ORDER — PROPOFOL 10 MG/ML
INJECTION, EMULSION INTRAVENOUS CONTINUOUS PRN
Status: DISCONTINUED | OUTPATIENT
Start: 2022-03-10 | End: 2022-03-10

## 2022-03-10 RX ORDER — LIDOCAINE HYDROCHLORIDE 10 MG/ML
INJECTION, SOLUTION EPIDURAL; INFILTRATION; INTRACAUDAL; PERINEURAL AS NEEDED
Status: DISCONTINUED | OUTPATIENT
Start: 2022-03-10 | End: 2022-03-10

## 2022-03-10 RX ORDER — PROPOFOL 10 MG/ML
INJECTION, EMULSION INTRAVENOUS AS NEEDED
Status: DISCONTINUED | OUTPATIENT
Start: 2022-03-10 | End: 2022-03-10

## 2022-03-10 RX ADMIN — LIDOCAINE HYDROCHLORIDE 50 MG: 10 INJECTION, SOLUTION EPIDURAL; INFILTRATION; INTRACAUDAL at 09:35

## 2022-03-10 RX ADMIN — PROPOFOL 40 MG: 10 INJECTION, EMULSION INTRAVENOUS at 09:35

## 2022-03-10 RX ADMIN — BACLOFEN 10 MG: 10 TABLET ORAL at 11:24

## 2022-03-10 RX ADMIN — CARBIDOPA AND LEVODOPA 1 TABLET: 25; 100 TABLET ORAL at 11:24

## 2022-03-10 RX ADMIN — DIVALPROEX SODIUM 500 MG: 125 CAPSULE, COATED PELLETS ORAL at 11:38

## 2022-03-10 RX ADMIN — PROPOFOL 80 MCG/KG/MIN: 10 INJECTION, EMULSION INTRAVENOUS at 09:35

## 2022-03-10 RX ADMIN — SODIUM CHLORIDE, SODIUM LACTATE, POTASSIUM CHLORIDE, AND CALCIUM CHLORIDE: .6; .31; .03; .02 INJECTION, SOLUTION INTRAVENOUS at 09:10

## 2022-03-10 RX ADMIN — OXYBUTYNIN CHLORIDE 5 MG: 5 TABLET ORAL at 11:24

## 2022-03-10 RX ADMIN — CITALOPRAM HYDROBROMIDE 40 MG: 20 TABLET ORAL at 11:24

## 2022-03-10 RX ADMIN — CALCIUM 1 TABLET: 500 TABLET ORAL at 11:24

## 2022-03-10 NOTE — DISCHARGE INSTR - AVS FIRST PAGE
Dear Toan Watkins,     It was our pleasure to care for you here at [facility]  It is our hope that we were always able to exceed the expected standards for your care during your stay  You were hospitalized due to colonoscopy  You were cared for on the 4th floor by Traci Denney DO under the service of Fletcher Mcbride MD with the [facility] who covers for your primary care physician (PCP), Gianluca Thao DO, while you were hospitalized  If you have any questions or concerns related to this hospitalization, you may contact us at [phone]  For follow up as well as any medication refills, we recommend that you follow up with your primary care physician  A registered nurse will reach out to you by phone within a few days after your discharge to answer any additional questions that you may have after going home  However, at this time we provide for you here, the most important instructions / recommendations at discharge:     Notable Medication Adjustments -   No adjustments to medications  Continue home medications as prescribed  Testing Required after Discharge -   None  Important follow up information -   None  Other Instructions -   None  Please review this entire after visit summary as additional general instructions including medication list, appointments, activity, diet, any pertinent wound care, and other additional recommendations from your care team that may be provided for you        Sincerely,     Traci Denney DO

## 2022-03-10 NOTE — ANESTHESIA PREPROCEDURE EVALUATION
Procedure:  COLONOSCOPY    Relevant Problems   ANESTHESIA (within normal limits)      CARDIO   (+) Hyperlipidemia      ENDO   (+) Hypothyroidism   (+) Type 2 diabetes mellitus, without long-term current use of insulin (HCC)      GI/HEPATIC   (+) Dysphagia   (+) Gastroesophageal reflux disease   (+) 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HCC)      HEMATOLOGY   (+) Iron deficiency anemia   (+) Macrocytic anemia      MUSCULOSKELETAL   (+) Osteoarthritis of both hips      NEURO/PSYCH   (+) Anxiety   (+) History of fall   (+) History of vitamin D deficiency   (+) Other chronic pain      Other   (+) Ambulatory dysfunction   (+) Bipolar disorder ([facility])   (+) Cerebral palsy ([facility])   (+) Parkinsonism ([facility])   (+) Peripheral neuropathy   (+) Polyneuropathy associated with underlying disease ([facility])   (+) Severe Intellectual disability   (+) 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[facility])   (+) Spasticity        Physical Exam    Airway    Mallampati score: II  TM Distance: >3 FB  Neck ROM: full     Dental   lower dentures and upper dentures,     Cardiovascular      Pulmonary      Other Findings        Anesthesia Plan  ASA Score- 3     Anesthesia Type- IV sedation with anesthesia with ASA Monitors  Additional Monitors:   Airway Plan:           Plan Factors-    Chart reviewed  EKG reviewed  Existing labs reviewed  Patient summary reviewed  Patient is not a current smoker  Induction-     Postoperative Plan-     Informed Consent- Anesthetic plan and risks discussed with healthcare power of   I personally reviewed this patient with the CRNA  Discussed and agreed on the Anesthesia Plan with the CRNA  Cl Celaya

## 2022-03-10 NOTE — ASSESSMENT & PLAN NOTE
Colonoscopy on 03/10:   IMPRESSION:  1  Small amounts of fibrous debris noted in parts of the colon  2  Mild circumferential narrowing noted in the transverse colon without any evidence of polyps or lesions  Severely tortuous colonic mucosa otherwise      RECOMMENDATION:  Repeat colonoscopy in 5 years due to an inadequate bowel preparation  High-fiber diet with 25-30 g of fiber on daily basis to avoid constipation    Can add Metamucil 1-2 tbsp on daily basis      Plan  -cleared by GI for discharge on 03/10

## 2022-03-10 NOTE — ASSESSMENT & PLAN NOTE
Lab Results   Component Value Date    HGBA1C 7 6 (H) 03/09/2022       Recent Labs     03/09/22  1631 03/09/22  2131 03/10/22  0731 03/10/22  1053   POCGLU 206* 112 151* 151*       Blood Sugar Average: Last 72 hrs:  (P) 155   Continue home medications

## 2022-03-10 NOTE — UTILIZATION REVIEW
Initial Clinical Review    Admission: Date/Time/Statement:   Admission Orders (From admission, onward)     Ordered        03/09/22 1414  Place in Observation  Once                      Orders Placed This Encounter   Procedures    Place in Observation     Standing Status:   Standing     Number of Occurrences:   1     Order Specific Question:   Level of Care     Answer:   Med Surg [16]          No chief complaint on file  Initial Presentation:64year-old female with hx cerebral palsy, intellectual disability, Parkinson's disease, hypothyroidism, type 2 diabetes, gastritis, ongoing dysphagia on pureed diet and honey thick liquids presents as direct admission for inpatient bowel prep via NGT for colonoscopy for colon cancer screening  Prep for colonoscopy 3 yrs ago was fairly poor  On exam, pt non verbal  Abdomen soft, non tender  Labs hgb 11 3   Pt admitted as OBS with dysphagia  Plan - Insert NGT for bowel prep, C scope 3/10, Accucheks with SSI   GI consult  GI-bowel prep orders placed-Go lytely via NGT       Date: 3/10    Presentation  Vitals   Temperature Pulse Respirations Blood Pressure SpO2   03/09/22 1454 03/09/22 1454 03/09/22 1454 03/09/22 1454 03/09/22 1454   97 6 °F (36 4 °C) 70 17 107/65 92 %      Temp Source Heart Rate Source Patient Position - Orthostatic VS BP Location FiO2 (%)   03/09/22 2201 -- 03/09/22 2201 03/09/22 2201 --   Axillary  Lying Right arm       Pain Score       --                 Wt Readings from Last 1 Encounters:   01/21/22 47 6 kg (105 lb)     Additional Vital Signs:   Date/Time Temp Pulse Resp BP MAP (mmHg) SpO2 Patient Position - Orthostatic VS   03/10/22 07:30:13 98 6 °F (37 °C) 67 16 109/61 77 92 % --   03/09/22 22:01:45 99 °F (37 2 °C) 85 18 106/65 79 88 % Abnormal  Lying   03/09/22 15:45:20 98 6 °F (37 °C) 81 -- 105/74 84 90 %        Pertinent Labs/Diagnostic Test Results:         Results from last 7 days   Lab Units 03/10/22  0527 03/09/22  1443   WBC Thousand/uL 7 34 6 03 HEMOGLOBIN g/dL 11 7 11 3*   HEMATOCRIT % 34 4* 34 1*   PLATELETS Thousands/uL 151 150         Results from last 7 days   Lab Units 03/10/22  0527 03/09/22  1443   SODIUM mmol/L 149* 145   POTASSIUM mmol/L 4 5 3 9   CHLORIDE mmol/L 108 102   CO2 mmol/L 32 30   ANION GAP mmol/L 9 13   BUN mg/dL 9 11   CREATININE mg/dL 0 86 0 87   EGFR ml/min/1 73sq m 74 73   CALCIUM mg/dL 9 6 9 3   MAGNESIUM mg/dL 1 8  --    PHOSPHORUS mg/dL 3 3  --      Results from last 7 days   Lab Units 03/09/22  1443   AST U/L 9   ALT U/L 13   ALK PHOS U/L 45*   TOTAL PROTEIN g/dL 6 7   ALBUMIN g/dL 2 9*   TOTAL BILIRUBIN mg/dL 0 13*     Results from last 7 days   Lab Units 03/10/22  0731 03/09/22  2131 03/09/22  1631   POC GLUCOSE mg/dl 151* 112 206*     Results from last 7 days   Lab Units 03/10/22  0527 03/09/22  1443   GLUCOSE RANDOM mg/dL 147* 277*         Results from last 7 days   Lab Units 03/09/22  1443   HEMOGLOBIN A1C % 7 6*   EAG mg/dl 171            Past Medical History:   Diagnosis Date    Adjustment disorder     Altered mental status 12/11/2015    Anemia     Bipolar 1 disorder ([facility] )     Cerebral palsy ([facility] )     Chronic hypernatremia 2/6/2016    Closed fracture of left hip ([facility] ) 1/19/2016    Closed left hip fracture (HCC)     no surgery    Constipation     Dehydration 2/20/2016    Diabetes mellitus [facility]     Disease of thyroid gland     Diverticulosis     Fracture of multiple ribs of right side     [facility] ) 07/26/2015    left    Hyperlipidemia     Hypernatremia 12/28/2018    Hypotension     Impulse control disorder     Incontinence     Intellectual disability due to developmental disorder, unspecified     Microalbuminuria     Osteopathia     Osteoporosis     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HCC)     Thrombocytopenia ([facility] ) 7/31/2015     Present on Admission:   Dysphagia   Parkinsonism ([facility] )   B[facility] disorder [facility]   Type 2 diabetes mellitus, without long-term current use of insulin ([facility] )   Severe Intellectual disability   Recurrent UTI (urinary tract infection)      Admitting Diagnosis: Dysphagia [R13 10]  Age/Sex: 63 y o  female  Admission Orders:  Scheduled Medications:  ARIPiprazole, 10 mg, Per NG Tube, HS  baclofen, 10 mg, Per NG Tube, TID  calcium carbonate, 1 tablet, Per NG Tube, BID With Meals  carbidopa-levodopa, 1 tablet, Per NG Tube, BID  citalopram, 40 mg, Per NG Tube, QAM  divalproex sodium, 500 mg, Per NG Tube, Q12H [address]  insulin lispro, 1-5 Units, Subcutaneous, TID AC  insulin lispro, 1-5 Units, Subcutaneous, HS  levothyroxine, 25 mcg, Per NG Tube, Early Morning  LORazepam, 0 5 mg, Per NG Tube, HS  omeprazole (PRILOSEC) suspension 2 mg/mL, 20 mg, Per NG Tube, BID AC  oxybutynin, 5 mg, Per NG Tube, TID  pravastatin, 40 mg, Per NG Tube, Daily With Dinner  traZODone, 100 mg, Per NG Tube, HS    polyethylene glycol (GOLYTELY) bowel prep 4,000 mL  Dose: 4,000 mL  Freq: Once Route: PO  Start: 03/09/22 1436 End: 03/09/22 1551      Continuous IV Infusions:  dextrose, 75 mL/hr, Intravenous, Continuous Start: 03/10/22 0800      PRN Meds:     poct glucose TID  Ambulation   NPO    IP CONSULT TO GASTROENTEROLOGY    Network Utilization Review Department  ATTENTION: Please call with any questions or concerns to [phone] and carefully listen to the prompts so that you are directed to the right person  All voicemails are confidential   Andreina Lloyd all requests for admission clinical reviews, approved or denied determinations and any other requests to dedicated fax number below belonging to the campus where the patient is receiving treatment   List of dedicated fax numbers for the Facilities:  [address] DENIALS (Administrative/Medical Necessity) [phone]   [address] (Maternity/NICU/Pediatrics) 261 [facility],[address] [facility] [address] [phone] 8049 [facility] [phone]     [address] [address] [place] [address] Vinod Morales 1481 [address] [place], [place] [address] [phone]

## 2022-03-10 NOTE — PLAN OF CARE
Problem: Potential for Falls  Goal: Patient will remain free of falls  Description: INTERVENTIONS:  - Educate patient/family on patient safety including physical limitations  - Instruct patient to call for assistance with activity   - Consult OT/PT to assist with strengthening/mobility   - Keep Call bell within reach  - Keep bed low and locked with side rails adjusted as appropriate  - Keep care items and personal belongings within reach  - Initiate and maintain comfort rounds  - Make Fall Risk Sign visible to staff  - Offer Toileting every  Hours, in advance of need  - Initiate/Maintain alarm  - Obtain necessary fall risk management equipment:   - Apply yellow socks and bracelet for high fall risk patients  - Consider moving patient to room near nurses station  Outcome: Progressing     Problem: MOBILITY - ADULT  Goal: Maintain or return to baseline ADL function  Description: INTERVENTIONS:  -  Assess patient's ability to carry out ADLs; assess patient's baseline for ADL function and identify physical deficits which impact ability to perform ADLs (bathing, care of mouth/teeth, toileting, grooming, dressing, etc )  - Assess/evaluate cause of self-care deficits   - Assess range of motion  - Assess patient's mobility; develop plan if impaired  - Assess patient's need for assistive devices and provide as appropriate  - Encourage maximum independence but intervene and supervise when necessary  - Involve family in performance of ADLs  - Assess for home care needs following discharge   - Consider OT consult to assist with ADL evaluation and planning for discharge  - Provide patient education as appropriate  Outcome: Progressing  Goal: Maintains/Returns to pre admission functional level  Description: INTERVENTIONS:  - Perform BMAT or MOVE assessment daily    - Set and communicate daily mobility goal to care team and patient/family/caregiver     - Collaborate with rehabilitation services on mobility goals if consulted  - Perform Range of Motion  times a day  - Reposition patient every  hours    - Dangle patient  times a day  - Stand patient  times a day  - Ambulate patient  times a day  - Out of bed to chair  times a day   - Out of bed for meals  times a day  - Out of bed for toileting  - Record patient progress and toleration of activity level   Outcome: Progressing     Problem: PAIN - ADULT  Goal: Verbalizes/displays adequate comfort level or baseline comfort level  Description: Interventions:  - Encourage patient to monitor pain and request assistance  - Assess pain using appropriate pain scale  - Administer analgesics based on type and severity of pain and evaluate response  - Implement non-pharmacological measures as appropriate and evaluate response  - Consider cultural and social influences on pain and pain management  - Notify physician/advanced practitioner if interventions unsuccessful or patient reports new pain  Outcome: Progressing     Problem: INFECTION - ADULT  Goal: Absence or prevention of progression during hospitalization  Description: INTERVENTIONS:  - Assess and monitor for signs and symptoms of infection  - Monitor lab/diagnostic results  - Monitor all insertion sites, i e  indwelling lines, tubes, and drains  - Monitor endotracheal if appropriate and nasal secretions for changes in amount and color  - [place] appropriate cooling/warming therapies per order  - Administer medications as ordered  - Instruct and encourage patient and family to use good hand hygiene technique  - Identify and instruct in appropriate isolation precautions for identified infection/condition  Outcome: Progressing  Goal: Absence of fever/infection during neutropenic period  Description: INTERVENTIONS:  - Monitor WBC    Outcome: Progressing     Problem: SAFETY ADULT  Goal: Patient will remain free of falls  Description: INTERVENTIONS:  - Educate patient/family on patient safety including physical limitations  - Instruct patient to call for assistance with activity   - Consult OT/PT to assist with strengthening/mobility   - Keep Call bell within reach  - Keep bed low and locked with side rails adjusted as appropriate  - Keep care items and personal belongings within reach  - Initiate and maintain comfort rounds  - Make Fall Risk Sign visible to staff  - Offer Toileting every  Hours, in advance of need  - Initiate/Maintain alarm  - Obtain necessary fall risk management equipment:   - Apply yellow socks and bracelet for high fall risk patients  - Consider moving patient to room near nurses station  Outcome: Progressing  Goal: Maintain or return to baseline ADL function  Description: INTERVENTIONS:  -  Assess patient's ability to carry out ADLs; assess patient's baseline for ADL function and identify physical deficits which impact ability to perform ADLs (bathing, care of mouth/teeth, toileting, grooming, dressing, etc )  - Assess/evaluate cause of self-care deficits   - Assess range of motion  - Assess patient's mobility; develop plan if impaired  - Assess patient's need for assistive devices and provide as appropriate  - Encourage maximum independence but intervene and supervise when necessary  - Involve family in performance of ADLs  - Assess for home care needs following discharge   - Consider OT consult to assist with ADL evaluation and planning for discharge  - Provide patient education as appropriate  Outcome: Progressing  Goal: Maintains/Returns to pre admission functional level  Description: INTERVENTIONS:  - Perform BMAT or MOVE assessment daily    - Set and communicate daily mobility goal to care team and patient/family/caregiver  - Collaborate with rehabilitation services on mobility goals if consulted  - Perform Range of Motion  times a day  - Reposition patient every  hours    - Dangle patient  times a day  - Stand patient  times a day  - Ambulate patient  times a day  - Out of bed to chair times a day   - Out of bed for meals times a day  - Out of bed for toileting  - Record patient progress and toleration of activity level   Outcome: Progressing     Problem: DISCHARGE PLANNING  Goal: Discharge to home or other facility with appropriate resources  Description: INTERVENTIONS:  - Identify barriers to discharge w/patient and caregiver  - Arrange for needed discharge resources and transportation as appropriate  - Identify discharge learning needs (meds, wound care, etc )  - Arrange for interpretive services to assist at discharge as needed  - Refer to Case Management Department for coordinating discharge planning if the patient needs post-hospital services based on physician/advanced practitioner order or complex needs related to functional status, cognitive ability, or social support system  Outcome: Progressing     Problem: Knowledge Deficit  Goal: Patient/family/caregiver demonstrates understanding of disease process, treatment plan, medications, and discharge instructions  Description: Complete learning assessment and assess knowledge base    Interventions:  - Provide teaching at level of understanding  - Provide teaching via preferred learning methods  Outcome: Progressing

## 2022-03-10 NOTE — ANESTHESIA POSTPROCEDURE EVALUATION
Post-Op Assessment Note    CV Status:  Stable  Pain Score: 0    Pain management: adequate     Mental Status:  Awake (baseline mentation, non-verbal)   Hydration Status:  Euvolemic   PONV Controlled:  Controlled   Airway Patency:  Patent      Post Op Vitals Reviewed: Yes      Staff: CRNA, Anesthesiologist         No complications documented      BP      Temp     Pulse  87   Resp   14   SpO2   100

## 2022-03-10 NOTE — DISCHARGE SUMMARY
[facility]  Discharge- Cynthea Reasons 1960, 63 y o  female MRN: [id number]  Unit/Bed#: S -48 Encounter: [id number]  Primary Care Provider: Lissette Fernandes DO   Date and time admitted to hospital: 3/9/2022  2:10 PM    * Screening for colon cancer  Assessment & Plan  Colonoscopy on 03/10:   IMPRESSION:  1  Small amounts of fibrous debris noted in parts of the colon  2  Mild circumferential narrowing noted in the transverse colon without any evidence of polyps or lesions  Severely tortuous colonic mucosa otherwise      RECOMMENDATION:  Repeat colonoscopy in 5 years due to an inadequate bowel preparation  High-fiber diet with 25-30 g of fiber on daily basis to avoid constipation    Can add Metamucil 1-2 tbsp on daily basis      Plan  -cleared by GI for discharge on 03/10    Dysphagia  Assessment & Plan  · On puree w/ HTL at home    Plan  -continue home diet    Parkinsonism (HCC)  Assessment & Plan  · Sinemet    Type 2 diabetes mellitus, without long-term current use of insulin [facility])  Assessment & Plan  Lab Results   Component Value Date    HGBA1C 7 6 (H) 03/09/2022       Recent Labs     03/09/22  1631 03/09/22  2131 03/10/22  0731 03/10/22  1053   POCGLU 206* 112 151* 151*       Blood Sugar Average: Last 72 hrs:  (P) 155   Continue home medications    Severe Intellectual disability  Assessment & Plan  · Nonverbal    Bipolar disorder (HCC)  Assessment & Plan  · C/w Depakote, Abilify, Celexa, Trazadone, and Ativan    Plan  -resume home meds    Recurrent UTI (urinary tract infection)  Assessment & Plan  · Restart prophylactic nitrofurantoin at d/c - cannot give IP as we only have capsules which cannot be given through NGT    Medical Problems             Resolved Problems  Date Reviewed: 3/9/2022    None              Discharging Resident: Joe Caputo DO  Discharging Attending: Marcelo Koroma MD  PCP: Lissette Fernandes DO  Admission Date:   Admission Orders (From admission, onward)     Ordered        03/09/22 1414  Place in Observation  Once                      Discharge Date: 03/10/22    Consultations During Hospital Stay:  · GI    Procedures Performed:   Colonoscopy    Significant Findings / Test Results:   Colonoscopy: : 1  Small amounts of fibrous debris noted in parts of the colon  · 2  Mild circumferential narrowing noted in the transverse colon without any evidence of polyps or lesions  Severely tortuous colonic mucosa otherwise  Incidental Findings:   · See above    Test Results Pending at Discharge (will require follow up): · None     Outpatient Tests Requested:  · None    Complications:  None    Reason for Admission:  Colonoscopy    Hospital Course:   Damion Avendano is a 63 y o  female patient PMH of cerebral palsy who originally presented to the hospital on 3/9/2022 due to colonoscopy for colon cancer screening  Patient has dysphagia so was necessary to introduce bowel prep through NG tube prior to procedure  No complications during stay  Colonoscopy findings above  Patient stable and discharged on 03/10  Please see above list of diagnoses and related plan for additional information  Condition at Discharge: stable    Discharge Day Visit / Exam:   Subjective:  Patient seen and examined at bedside  Not in acute distress  Nonverbal at baseline  Vitals: Blood Pressure: 101/52 (03/10/22 1100)  Pulse: 60 (03/10/22 1100)  Temperature: 98 °F (36 7 °C) (03/10/22 1100)  Temp Source: Axillary (03/09/22 2201)  Respirations: 16 (03/10/22 1100)  SpO2: 97 % (03/10/22 1100)  Exam:   Physical Exam  Vitals reviewed  Constitutional:       General: She is not in acute distress  Appearance: She is not toxic-appearing  HENT:      Head: Normocephalic and atraumatic  Eyes:      General: No scleral icterus  Conjunctiva/sclera: Conjunctivae normal    Cardiovascular:      Rate and Rhythm: Normal rate and regular rhythm  Heart sounds: Normal heart sounds  Pulmonary:      Effort: No respiratory distress  Breath sounds: Normal breath sounds  No wheezing or rales  Abdominal:      General: Bowel sounds are normal  There is no distension  Palpations: Abdomen is soft  Tenderness: There is no guarding  Musculoskeletal:      Right lower leg: No edema  Left lower leg: No edema  Skin:     General: Skin is warm and dry  Capillary Refill: Capillary refill takes less than 2 seconds  Neurological:      Mental Status: She is alert  Comments: Muscle atrophy and contractures (cerebral palsy)   Psychiatric:      Comments: Nonverbal          Discussion with Family:None     Discharge instructions/Information to patient and family:   See after visit summary for information provided to patient and family  Provisions for Follow-Up Care:  See after visit summary for information related to follow-up care and any pertinent home health orders  Disposition:   Group home     Planned Readmission: none     Discharge Medications:  See after visit summary for reconciled discharge medications provided to patient and/or family        **Please Note: This note may have been constructed using a voice recognition system**

## 2022-03-10 NOTE — PLAN OF CARE
Problem: MOBILITY - ADULT  Goal: Maintain or return to baseline ADL function  Description: INTERVENTIONS:  -  Assess patient's ability to carry out ADLs; assess patient's baseline for ADL function and identify physical deficits which impact ability to perform ADLs (bathing, care of mouth/teeth, toileting, grooming, dressing, etc )  - Assess/evaluate cause of self-care deficits   - Assess range of motion  - Assess patient's mobility; develop plan if impaired  - Assess patient's need for assistive devices and provide as appropriate  - Encourage maximum independence but intervene and supervise when necessary  - Involve family in performance of ADLs  - Assess for home care needs following discharge   - Consider OT consult to assist with ADL evaluation and planning for discharge  - Provide patient education as appropriate  Outcome: Progressing  Goal: Maintains/Returns to pre admission functional level  Description: INTERVENTIONS:  - Perform BMAT or MOVE assessment daily    - Set and communicate daily mobility goal to care team and patient/family/caregiver  - Collaborate with rehabilitation services on mobility goals if consulted  - Perform Range of Motion 4 times a day  - Reposition patient every 2 hours    - Dangle patient 4 times a day  - Stand patient 4 times a day  - Ambulate patient 4 times a day  - Out of bed to chair 4 times a day   - Out of bed for meals 3 times a day  - Out of bed for toileting  - Record patient progress and toleration of activity level   Outcome: Progressing     Problem: GASTROINTESTINAL - ADULT  Goal: Maintains adequate nutritional intake  Description: INTERVENTIONS:  - Monitor percentage of each meal consumed  - Identify factors contributing to decreased intake, treat as appropriate  - Assist with meals as needed  - Monitor I&O, weight, and lab values if indicated  - Obtain nutrition services referral as needed  Outcome: Progressing     Problem: NEUROSENSORY - ADULT  Goal: Achieves stable or improved neurological status  Description: INTERVENTIONS  - Monitor and report changes in neurological status  - Monitor vital signs such as temperature, blood pressure, glucose, and any other labs ordered   - Initiate measures to prevent increased intracranial pressure  - Monitor for seizure activity and implement precautions if appropriate      Outcome: Progressing  Goal: Achieves maximal functionality and self care  Description: INTERVENTIONS  - Monitor swallowing and airway patency with patient fatigue and changes in neurological status  - Encourage and assist patient to increase activity and self care     - Encourage visually impaired, hearing impaired and aphasic patients to use assistive/communication devices  Outcome: Progressing

## 2022-03-22 ENCOUNTER — TELEPHONE (OUTPATIENT)
Dept: NEUROLOGY | Facility: CLINIC | Age: 62
End: 2022-03-22

## 2022-03-24 ENCOUNTER — TELEPHONE (OUTPATIENT)
Dept: FAMILY MEDICINE CLINIC | Facility: CLINIC | Age: 62
End: 2022-03-24

## 2022-03-24 NOTE — TELEPHONE ENCOUNTER
Caregiver Lisa cota, wanted to report that she got a call this morning that pt was lethargic and her fasting BS was 289  When [facility] arrived today at 10am she was going to take her to the ER but saw she was looking much better, vitals were good and BS was 122, pt was smiling and had no issues  They usually only check her sugars on Saturdays but will consistently be checking for the next 4 days  She's not sure what happened in the AM but wants Dr Cassie Bailey to review, has appt with her 4/4       [facility] can be reached at [phone]

## 2022-03-24 NOTE — TELEPHONE ENCOUNTER
Called Lisa at [facility] to check on status of Terrie  She states that since the episode of lethargy this morning she has been awake, alert, and interactive with the staff  Vitals were checked in the afternoon in which she was afebrile, BP at 101/65, HR at 77, and sp O2 at 98  She was interactive and eating and drinking with no issues  Discussed how the patient had an elevated glucose of 289 which was fasting which was the highest it has been  After discussion with Sree Mina, plan is to check patient's fasting glucose Friday until Monday and she will message me with the results  If fasting glucose is averaging in the 200s will plan to change her long acting insulin from administration in the morning to evening before bed  Also advised if any change in mental status/lethargy to take patient to ED for immediate evaluation  Plan discussed in detail with Sree Mina and agreed with plan

## 2022-03-24 NOTE — TELEPHONE ENCOUNTER
Call placed to [facility] in regard to patient to gather more information  Per [facility], patient's nurse went in to patient's room this morning to administer medication and patient was lethargic and wouldn't open eyes  Fasting blood sugar was 289 at 0630  Repeat blood sugar at 0845 was 199  Then, [facility] assisted patient onto commode chair at 1050 and patient was able to void  [facility] noted that patient was sitting up straight, not leaning to one side and was alert, had bowel sounds and lungs were clear  She repeated blood sugar at that time and it was 122  [facility] stated last A1C was 7 6%  She states she was going to take patient to the ED, however decided against this since patient became more alert  [facility] also noted that patient had a great day yesterday; patient was very alert since decreasing Abilify  [facility] states she originally thought patient had a UTI  [facility] also states she is going to have patient's blood sugars checked everyday for the next four days and call back on 03/28/2022 with what they are  [facility] states she still needs a call back from Dr Nigel Bence to advise with further instructions  Thank you!

## 2022-03-30 ENCOUNTER — OFFICE VISIT (OUTPATIENT)
Dept: NEUROLOGY | Facility: CLINIC | Age: 62
End: 2022-03-30
Payer: MEDICARE

## 2022-03-30 VITALS — SYSTOLIC BLOOD PRESSURE: 114 MMHG | HEART RATE: 76 BPM | DIASTOLIC BLOOD PRESSURE: 59 MMHG | TEMPERATURE: 95.2 F

## 2022-03-30 DIAGNOSIS — G82.50 SPASTIC QUADRIPARESIS (HCC): ICD-10-CM

## 2022-03-30 DIAGNOSIS — G20 PARKINSON'S DISEASE (HCC): ICD-10-CM

## 2022-03-30 PROCEDURE — 99214 OFFICE O/P EST MOD 30 MIN: CPT | Performed by: STUDENT IN AN ORGANIZED HEALTH CARE EDUCATION/TRAINING PROGRAM

## 2022-03-30 NOTE — PROGRESS NOTES
Patient ID: Swati Reyes is a 63 y o  female  Assessment/Plan:    No problem-specific Assessment & Plan notes found for this encounter  {Assess/PlanSmartLinks:53011}       Subjective:    HPI    {[facility] HPI texts:39798}    {Common ambulatory SmartLinks:06806}         Objective:    Last menstrual period 11/29/2009, not currently breastfeeding  Physical Exam    Neurological Exam      ROS:    Review of Systems   Constitutional: Negative  Negative for appetite change and fever  HENT: Negative  Negative for hearing loss, tinnitus, trouble swallowing and voice change  Eyes: Negative  Negative for photophobia and pain  Respiratory: Negative  Negative for shortness of breath  Cardiovascular: Negative  Negative for palpitations  Gastrointestinal: Negative  Negative for nausea and vomiting  Endocrine: Negative  Negative for cold intolerance  Genitourinary: Negative  Negative for dysuria, frequency and urgency  Musculoskeletal: Negative  Negative for myalgias and neck pain  Skin: Negative  Negative for rash  Neurological: Negative  Negative for dizziness, tremors, seizures, syncope, facial asymmetry, speech difficulty, weakness, light-headedness, numbness and headaches  Hematological: Negative  Does not bruise/bleed easily  Psychiatric/Behavioral: Negative  Negative for confusion, hallucinations and sleep disturbance  All other systems reviewed and are negative

## 2022-03-30 NOTE — PROGRESS NOTES
[facility] -   Follow up appointment    Impression/Plan    Syed Sneed is a 63 y o  female with a PMH of intellectual disability 2/2 cerebral palsy, diabetes, and dysphagia presenting for follow up of her tremors and parkinsonian symptoms  The patients Parkinsonian symptoms have improved with Sinemet  We will increase the dosing frequency to TID  Agree with plan to wean Abilify further to discontinuation  She will see Dr Yancy Russell in three weeks to help with spasticity  Plan outlined below:     #Parkinsonian tremor  - Increase Sinemet to 100/25 TID  - Discontinue Abilify in May     #Spasticity  - C/w baclofen 10 mg  TID  - To be seen by Dr Yancy Russell     - Follow up in 3 weeks with movement AP    Diagnoses and all orders for this visit:    [facility]  -     carbidopa-levodopa (Sinemet)  mg per tablet; Take 1 tablet by mouth 3 (three) times a day    Spastic quadriparesis [facility])        Subjective    Syed Sneed is a 63 y o  female with a PMH of intellectual disability 2/2 cerebral palsy, diabetes, and dysphagia presenting for follow up  For review: This patient was previously seen by Dr Yancy Russell  She saw the patient for the first time in January 2016 when she was referred by Dr Mamie Severs for change in mental status and new onset staring spells   EEG around that time just showed diffuse irregular intermixed theta and delta slowing  Over a series of follow-up visits (see last office note for details) , it became apparent that the patient staring spells would occur mostly when she was debilitated or ill, and were not typical of epileptic seizures      In 2018, Dr Yancy Russell prescribed Baclofen 10 mg BID for her left spastic hemiparesis, maintained her on the valproate 500 mg tabs 2 tabs BID that she was taking for bipolar disorder) and referred her to Dr Bertrand العراقي for further management of her spasticity and ambulatory dysfunction      She was last seen by me 10/2021 in which her largest concern was increased tremors, and gait changes, possible "freezing"  Her exam did show significant spasticity  Symptoms worsening over the previous months  She was started on a trial of sinemet, she was referred to PM&R due to spasticity  The patient followed up with Hazel Linares on 1/21/2022  It was noted that her sinemet BID was beneficial  However, it was not increased to TID because her day program won't give medication  She has been following with psych  She did not yet see PM&R for her spasticity  Plan at that time was to see if psych could further reduce the Abilify and see if we can increase the sinemet to 1 tab TID or even 1 5 tabs TID  Consideration for starting amantadine was also discussed  Interval history:    Since last seen, the patient is now on 5 mg of abilify  There is a plan to stop the abilify in May  She continues to have spasticity  She has an appointment with Dr Bucky Jolley in 3 weeks  Her sinemet is currently given 7 AM and 4 PM  She isn't able to get it scheduled TID due to her day program but they are trying to see if there is a work out  The sinemet has lead to some improvement in her facial expression and tremor  History Reviewed: The following were reviewed and updated as appropriate: allergies, current medications, past family history, past medical history, past social history, past surgical history and problem list    ROS:  Constitutional: Negative  Negative for appetite change and fever  HENT: Negative  Negative for hearing loss, tinnitus, trouble swallowing and voice change  Eyes: Negative  Negative for photophobia and pain  Respiratory: Negative  Negative for shortness of breath  Cardiovascular: Negative  Negative for palpitations  Gastrointestinal: Negative  Negative for nausea and vomiting  Endocrine: Negative  Negative for cold intolerance  Genitourinary: Negative    Negative for dysuria, frequency and urgency  Musculoskeletal: Negative  Negative for myalgias and neck pain  Skin: Negative  Negative for rash  Neurological: Negative  Negative for dizziness, tremors, seizures, syncope, facial asymmetry, speech difficulty, weakness, light-headedness, numbness and headaches  Hematological: Negative  Does not bruise/bleed easily  Psychiatric/Behavioral: Negative  Negative for confusion, hallucinations and sleep disturbance  All other systems reviewed and are negative  Objective    /59   Pulse 76   Temp (!) 95 2 °F (35 1 °C)   LMP 11/29/2009 (Exact Date)      General Exam  General: well developed, no acute distress  HEENT: mucous membranes moist, anicteric sclera  Neck: supple, good ROM  Extremities: no clubbing, cyanosis or edema  Skin: no rash on visible skin      Neurological Exam  Mental Status: awake and alert  Able to say hi but no other verbal output       Language: Able to follow some simple commands (squeeze fingers)      Cranial Nerves: Pupils equal and reactive to light  Visual fields full to confrontation  Extraocular motions intact with full versions, normal pursuits and saccades       Motor: Decreased bulk  Significant spasticity of all 4 extremities  Strength testing limited by spasticity and intellectual disability      Sensory: Sensation intact to light touch distally in all extremities      Coordination: Unable to performed finger-to-nose or rapid alternating movements  Bilateral upper extremity independent tremors at the wrist at rest       Station and gait: Deferred     Reflexes: Reflexes unappreciable due to spasticity      Alfonzo Mirza MD   [facility]  [facility] [address] [facility]

## 2022-04-04 ENCOUNTER — OFFICE VISIT (OUTPATIENT)
Dept: FAMILY MEDICINE CLINIC | Facility: CLINIC | Age: 62
End: 2022-04-04

## 2022-04-04 VITALS
TEMPERATURE: 97.5 F | RESPIRATION RATE: 18 BRPM | OXYGEN SATURATION: 96 % | HEART RATE: 80 BPM | SYSTOLIC BLOOD PRESSURE: 122 MMHG | DIASTOLIC BLOOD PRESSURE: 73 MMHG

## 2022-04-04 DIAGNOSIS — E11.00 TYPE 2 DIABETES MELLITUS WITH HYPEROSMOLARITY WITHOUT COMA, WITHOUT LONG-TERM CURRENT USE OF INSULIN (HCC): Primary | ICD-10-CM

## 2022-04-04 DIAGNOSIS — M81.0 OSTEOPOROSIS, UNSPECIFIED OSTEOPOROSIS TYPE, UNSPECIFIED PATHOLOGICAL FRACTURE PRESENCE: ICD-10-CM

## 2022-04-04 DIAGNOSIS — Z12.31 SCREENING MAMMOGRAM FOR BREAST CANCER: ICD-10-CM

## 2022-04-04 PROCEDURE — 99213 OFFICE O/P EST LOW 20 MIN: CPT | Performed by: FAMILY MEDICINE

## 2022-04-04 RX ORDER — GLIPIZIDE 2.5 MG/1
2.5 TABLET, EXTENDED RELEASE ORAL DAILY
Qty: 30 TABLET | Refills: 2 | Status: SHIPPED | OUTPATIENT
Start: 2022-04-04 | End: 2022-04-07 | Stop reason: ALTCHOICE

## 2022-04-04 NOTE — ASSESSMENT & PLAN NOTE
Lab Results   Component Value Date    HGBA1C 7 6 (H) 03/09/2022     A1c worsening, with previous in December at 7 3  Current regimen: Metformin 1000mg BID and Linagliptin 5mg q daily   Hx of recurrent UTI and therefore use of SGLT2 such as Jardiance is not an adequate option for the patient  Discussed with Marine Lucien regarding starting an additional medication to help better manage DM and will add Glipizide 2 5gmg once a day, will need to take 30 mins before first main meal  Discussed risk of hypoglycemia  Advised to check glucose daily for at least the first two weeks and to send clinic glucose log    Parameters for nursing staff of facility:  If fasting glucose is 60 and below with changes in mental status as in more lethargic/ decrease in activity, recheck glucose within 5 minutes of first check and give patient a glass of orange juice  Recheck glucose in 15 minutes to 30 minutes  If no improvement in mental status change in patient and glucose did not increase to 80 and above and give glucose icing  Recheck Glucose in another 15 minutes and call clinic to notify providers for recommendation  If patient has continued lethargy, do not hesitate to take to Emergency department  If glucose is 250 and above with changes in mental status as in increase lethargy, recheck glucose in 15 minutes and call clinic for recommendations

## 2022-04-04 NOTE — PROGRESS NOTES
Assessment/Plan:    Type 2 diabetes mellitus, without long-term current use of insulin (HCC)    Lab Results   Component Value Date    HGBA1C 7 6 (H) 03/09/2022     A1c worsening, with previous in December at 7 3  Current regimen: Metformin 1000mg BID and Linagliptin 5mg q daily   Hx of recurrent UTI and therefore use of SGLT2 such as Jardiance is not an adequate option for the patient  Discussed with Ya Sneed regarding starting an additional medication to help better manage DM and will add Glipizide 2 5gmg once a day, will need to take 30 mins before first main meal  Discussed risk of hypoglycemia  Advised to check glucose daily for at least the first two weeks and will to clinic glucose log  -If A1C is elevated at repeat in 3 months with no improvement with the addition with Glipizide, consider referral to Endocrinology at follow up      Parameters for nursing staff:  If fasting glucose is 60 and below with changes in mental status as in more lethargic/ decrease in activity, recheck glucose within 5 minutes of first check and give patient a glass of orange juice  Recheck glucose in 15 minutes to 30 minutes  If no improvement in mental status change in patient and glucose did not increase to 80 and above and give glucose icing  Recheck Glucose in another 15 minutes and call clinic to notify providers for recommendation  If patient has continued lethargy, do not hesitate to take to Emergency department  If glucose is 250 and above with changes in mental status as in increase lethargy, recheck glucose in 15 minutes and call clinic for recommendations      (parameters sent in letter to [facility])         Diagnoses and all orders for this visit:    Type 2 diabetes mellitus with hyperosmolarity without coma, without long-term current use of insulin (HCC)  -     glipiZIDE (GLUCOTROL XL) 2 5 mg 24 hr tablet; Take 1 tablet (2 5 mg total) by mouth daily  -     HEMOGLOBIN A1C W/ EAG ESTIMATION;  Future    Screening mammogram for breast cancer  -     Mammo screening bilateral w cad; Future    Osteoporosis, unspecified osteoporosis type, unspecified pathological fracture presence  -     Basic metabolic panel; Future  -     Basic metabolic panel; Future  -     DXA bone density spine hip and pelvis; Future          Subjective:      Patient ID: Elaine Adames is a 63 y o  female  HPI    Patient chase 64 yo female with hx of hx of Cerebal Palsy, DM, recurrent UTIs and dysphagia who presents for follow up regarding Diabetes  She is accompanied to the visit by the facility nurse [facility]  Of note, patient's current regimen is Metformin 1000mg BID and Linagliptin 5mg q daily which was added in June of 2021 due to rising A1C  Review of previous blood glucose over the course of last month in which her fasting has been between 140s to 160s with one outlier at 289 on the 24th of march which occurred during a period of lethargy  Currently checking blood glucose weekly but recently increased to daily due to concerns about hyperglycemia however glucose after the event on the 24th has been stable between 140s and 160s  Per Lisa, BG on 4/1 was 166, on 4/2 was 179, 4/3 was 119, and 4/4 was 161  No other periods of lethargy  Will need BG parameters for nursing staff in cases of hypo and hyperglycemia       The following portions of the patient's history were reviewed and updated as appropriate: allergies, current medications, past family history, past medical history, past social history, past surgical history and problem list       Current Outpatient Medications:     acetaminophen (TYLENOL) 325 mg tablet, TAKE 2 TABLETS BY MOUTH (650MG) EVERY 6 HOURS AS NEEDED FOR PAIN / FEVER GREATER THAN 100 4, Disp: 10 tablet, Rfl: 11    ARIPiprazole (ABILIFY) 30 mg tablet, Take 1 tablet (30 mg) by mouth daily at 8 pm (Patient taking differently: 5 mg Take 1 tablet  by mouth daily at 8 pm ), Disp: , Rfl: 0    baclofen 10 mg tablet, Take 1 tablet (10 mg total) by mouth 3 (three) times a day, Disp: 90 tablet, Rfl: 11    benzonatate (TESSALON PERLES) 100 mg capsule, Take 1 capsule (100 mg total) by mouth every 8 (eight) hours as needed for cough, Disp: 30 capsule, Rfl: 0    calcium carbonate (OS-MORRIS) 600 MG tablet, Take 1 tablet (600 mg total) by mouth 2 (two) times a day with meals, Disp: 60 tablet, Rfl: 2    carbamide peroxide (DEBROX) 6 5 % otic solution, Administer 2 drops into both ears daily as needed 2 drops in the affected ear prn x 5 days, Disp: , Rfl:     carbidopa-levodopa (Sinemet)  mg per tablet, Take 1 tablet by mouth 3 (three) times a day, Disp: 120 tablet, Rfl: 3    carboxymethylcellulose (REFRESH TEARS) 0 5 % SOLN, 1 drop 3 (three) times a day as needed for dry eyes, Disp: , Rfl:     Citalopram Hydrobromide (CELEXA PO), Take 40 mg by mouth every morning  , Disp: , Rfl:     docosanol (ABREVA) 10 %, Apply topically 5 (five) times a day Apply and rub in 5x a day until healed as needed for lesion, Disp: , Rfl: 0    ferrous sulfate 324 (65 Fe) mg, Take 325 mg by mouth 2 (two) times a day , Disp: , Rfl:     fexofenadine (ALLEGRA) 180 MG tablet, Take 180 mg by mouth as needed  , Disp: , Rfl:     fluticasone (FLONASE) 50 mcg/act nasal spray, 1 spray into each nostril daily as needed for rhinitis or allergies, Disp: 1 Bottle, Rfl: 1    guaiFENesin (ROBITUSSIN) 100 MG/5ML oral liquid, Take 200 mg by mouth every 8 (eight) hours as needed 2 tsp every 8 hours as needed for cough, Disp: , Rfl:     hydrocortisone 1 % cream, Apply topically to affected area twice daily as needed for rash, Disp: 30 g, Rfl: 0    levothyroxine (SYNTHROID) 25 mcg tablet, Take 1 tablet (25 mcg) by mouth every morning, Disp: , Rfl: 0    linaGLIPtin (Tradjenta) 5 MG TABS, Take 5 mg by mouth daily, Disp: , Rfl:     LORazepam (ATIVAN) 0 5 mg tablet, Take 0 5 mg by mouth daily at bedtime  , Disp: , Rfl:     metFORMIN (GLUCOPHAGE) 1000 MG tablet, Take 1 tablet (1,000 mg total) by mouth 2 (two) times a day with meals, Disp: 60 tablet, Rfl: 2    neomycin-bacitracin-polymyxin b (NEOSPORIN) ointment, Apply 1 application topically 2 (two) times a day as needed, Disp: , Rfl:     nitrofurantoin (MACRODANTIN) 50 mg capsule, Take 50 mg by mouth daily at bedtime, Disp: , Rfl:     olopatadine HCl (PATADAY) 0 2 % opth drops, Administer 1 drop to both eyes as needed, Disp: , Rfl:     Oxybutynin Chloride (DITROPAN XL PO), Take 5 mg by mouth 3 (three) times a day , Disp: , Rfl:     polyethylene glycol (GLYCOLAX) 17 GM/SCOOP powder, DISSOLVE 17GM (1 CAPFUL) IN 8 OZ FLUIDS AND CONSUME BY MOUTH EVERY MORNING *HOLD 1 DAY FOR LOOSE STOOLS* (CONSTIPATION), Disp: 510 g, Rfl: 10    simvastatin (ZOCOR) 20 mg tablet, Take 1 tablet (20 mg total) by mouth daily with dinner at 4 pm for hyperlipidemia, Disp: , Rfl:     Starch, Thickening, POWD, Take by mouth daily Use on food and liquid as directed, Disp: 1020 g, Rfl: 3    traZODone (DESYREL) 100 mg tablet, Take 1 tablet (100 mg total) by mouth daily at  8 pm for depression, Disp: , Rfl: 0    Vitamins A & D (VITAMIN A & D) ointment, Apply topically as needed for dry skin, Disp: , Rfl:     denosumab (PROLIA) 60 mg/mL, Inject 1 mL (60 mg total) under the skin once for 1 dose, Disp: 1 mL, Rfl: 1    glipiZIDE (GLUCOTROL XL) 2 5 mg 24 hr tablet, Take 1 tablet (2 5 mg total) by mouth daily, Disp: 30 tablet, Rfl: 2    linaGLIPtin 5 MG TABS, Take 5 mg by mouth daily, Disp: 90 tablet, Rfl: 0    omeprazole (PriLOSEC) 20 mg delayed release capsule, Take 1 capsule (20 mg total) by mouth 2 (two) times a day before meals, Disp: 60 capsule, Rfl: 2    valACYclovir (VALTREX) 1,000 mg tablet, Take 1 tablet (1,000 mg total) by mouth 3 (three) times a day for 7 days (Patient not taking: Reported on 1/21/2022 ), Disp: 21 tablet, Rfl: 0    valproic acid (DEPAKENE) 250 MG/5ML soln, TAKE 2 TEASPOONSFUL (10ML) (500MG) BY MOUTH TWICE A DAY (BIPOLAR), Disp: 473 mL, Rfl: 10    Review of Systems   Unable to perform ROS: Patient nonverbal         Objective:      /73 (BP Location: Left arm, Patient Position: Sitting, Cuff Size: Standard)   Pulse 80   Temp 97 5 °F (36 4 °C) (Temporal)   Resp 18   LMP 11/29/2009 (Exact Date)   SpO2 96%          Physical Exam  Vitals reviewed  Constitutional:       General: She is not in acute distress  Appearance: She is not ill-appearing  Cardiovascular:      Rate and Rhythm: Normal rate and regular rhythm  Pulmonary:      Effort: Pulmonary effort is normal       Breath sounds: Normal breath sounds  No wheezing or rales  Abdominal:      General: Bowel sounds are normal       Palpations: Abdomen is soft  Musculoskeletal:      Cervical back: Neck supple  Skin:     General: Skin is warm  Neurological:      Mental Status: She is alert

## 2022-04-04 NOTE — LETTER
Parameters for checking blood glucose for patient Abbie Kate:  -Current regimen: metformin 1000mg twice a day, Linagliptin 5mg once a day, and adding Glipizide 2 5mg once a day (to be taken 30 minutes before first meal of the day)  -As adding new medication, please check blood glucose daily for at least two weeks, and if stable with no episodes of hypoglycemia (blood glucose less than 60), can change to three times a week    Parameters for nursing staff:  If fasting glucose is 60 and/or  below with changes in mental status as in more lethargic/ decrease in activity, recheck glucose within 5 minutes of first check and give patient a glass of orange juice  Recheck glucose in 15 minutes to 30 minutes  If no improvement in mental status change in patient and glucose did not increase to 80 and above and give glucose icing  Recheck Glucose in another 15 minutes and call clinic to notify providers for recommendation  If patient has continued lethargy, do not hesitate to take to Emergency department  If glucose is 250 and above with changes in mental status as in increase lethargy, recheck glucose in 15 minutes and call clinic for recommendations

## 2022-04-04 NOTE — LETTER
Parameters for checking blood glucose for patient Teresa Rob:  -Current regimen: metformin 1000mg twice a day, Linagliptin 5mg once a day, and adding Glipizide 2 5mg once a day (to be taken 30 minutes before first meal of the day)  -As adding new medication, please check blood glucose daily for at least two weeks, and if stable with no episodes of hypoglycemia (blood glucose less than 60), can change to three times a week    Parameters for nursing staff:  If fasting glucose is 60 and/or  below with changes in mental status as in more lethargic/ decrease in activity, recheck glucose within 5 minutes of first check and give patient a glass of orange juice  Recheck glucose in 15 minutes to 30 minutes  If no improvement in mental status change in patient and glucose did not increase to 80 and above and give glucose icing  Recheck Glucose in another 15 minutes and call clinic to notify providers for recommendation  If patient has continued lethargy, do not hesitate to take to Emergency department  If glucose is 250 and above with changes in mental status as in increase lethargy, recheck glucose in 15 minutes and call clinic for recommendations

## 2022-04-05 ENCOUNTER — TELEPHONE (OUTPATIENT)
Dept: FAMILY MEDICINE CLINIC | Facility: CLINIC | Age: 62
End: 2022-04-05

## 2022-04-05 DIAGNOSIS — E11.69 TYPE 2 DIABETES MELLITUS WITH OTHER SPECIFIED COMPLICATION, WITHOUT LONG-TERM CURRENT USE OF INSULIN (HCC): Primary | ICD-10-CM

## 2022-04-05 DIAGNOSIS — E11.00 TYPE 2 DIABETES MELLITUS WITH HYPEROSMOLARITY WITHOUT COMA, WITHOUT LONG-TERM CURRENT USE OF INSULIN (HCC): ICD-10-CM

## 2022-04-05 NOTE — TELEPHONE ENCOUNTER
Lisa called from [facility] stated that the med glipizide (glucotrol XL) can not be crush and all the pt has to be crushed  Mary Wood would like to know what Dr Dave Cartagena would like her to do?       Mary Wood [phone]

## 2022-04-05 NOTE — TELEPHONE ENCOUNTER
Called pharmacy,  does not recommend crushing any of the Glipizide products  Are you able to change to something else?

## 2022-04-07 RX ORDER — GLIMEPIRIDE 1 MG/1
1 TABLET ORAL
Qty: 90 TABLET | Refills: 3 | Status: SHIPPED | OUTPATIENT
Start: 2022-04-07

## 2022-04-15 ENCOUNTER — TELEPHONE (OUTPATIENT)
Dept: FAMILY MEDICINE CLINIC | Facility: CLINIC | Age: 62
End: 2022-04-15

## 2022-04-19 ENCOUNTER — CONSULT (OUTPATIENT)
Dept: NEUROLOGY | Facility: CLINIC | Age: 62
End: 2022-04-19
Payer: MEDICARE

## 2022-04-19 VITALS — SYSTOLIC BLOOD PRESSURE: 124 MMHG | DIASTOLIC BLOOD PRESSURE: 70 MMHG | HEART RATE: 72 BPM

## 2022-04-19 DIAGNOSIS — G24.3 CERVICAL DYSTONIA: ICD-10-CM

## 2022-04-19 DIAGNOSIS — G24.9 DYSTONIA: Primary | ICD-10-CM

## 2022-04-19 DIAGNOSIS — G80.0 SPASTIC QUADRIPLEGIC CEREBRAL PALSY (HCC): ICD-10-CM

## 2022-04-19 DIAGNOSIS — G82.50 SPASTIC QUADRIPARESIS (HCC): ICD-10-CM

## 2022-04-19 DIAGNOSIS — G20 PARKINSONISM, UNSPECIFIED PARKINSONISM TYPE (HCC): ICD-10-CM

## 2022-04-19 PROCEDURE — 99215 OFFICE O/P EST HI 40 MIN: CPT | Performed by: PHYSICAL MEDICINE & REHABILITATION

## 2022-04-19 RX ORDER — DIVALPROEX SODIUM 125 MG/1
CAPSULE, COATED PELLETS ORAL 4 TIMES DAILY
COMMUNITY
Start: 2022-04-11

## 2022-04-19 NOTE — PROGRESS NOTES
Physical Medicine & Rehabilitation New Patient Evaluation  Caesar Haynes 63 y o  female    FOLLOWED BY: Franck Perez MD (Neurology)  ? REASON FOR CONSULTATION: Spasticity evaluation  ? 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 HISTORY OF ILLNESS:   BRIEF NARRATIVE DESCRIBING HISTORY:  This 63year old right handed female with medical history of 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, parkinsonian symptoms, bipolar disorder (on valproate) was referred by Neurology for a spasticity consult  The patient was accompanied by  her charge nurse Lucia Ron, [facility], an [facility]  Medical records from [facility]  were reviewed  ?  Ms Carrasco follows with Neurology for Parkinsonian symptoms for which she is now on Sinemet and for "staring spells"  She has major issues with initiation  Her cerebral palsy used to be quite mild, she was ambulatory, but would run on her "tiptoes"  That has changed significantly (See "evolution of disability" below)  Now she requires max A with everything  Her caregiver gave a very detailed overview that reveals extreme sensitivity to medications, and now posturing and positioning that impacts her function  Particularly hip adduction, posturing at the ankles, and knee flexion  She has had worsening L hand tightness too that they have started using a resting hand splint for  Overall not only has her function been worsening, but her swallow has not improved (this is a more recent symptom), and her verbalization has markedly worsened  Since decreasing the Abilify they have noticed marked improvement in her flat affect, her initiation (somewhat), and participation, but that fluctuates wildly throughout the day  ? Symptoms/Functional Limitations Related to Spasticity:   Stiffness: Originally her tone was predominantly in her lower extremities, causing kind of an equinus sounding gait with toe walking   But now she is starting to develop stiffness in her upper extremities as well    She also is having issues with torticollis now  Spasms: Less spasms, more just intrinsic tightness  Spasticity interferes with sleep: No, she does tend to have difficulty sleeping - takes trazodone for this  Spasticity interferes with function: It does make care more difficult  Patient has difficulty transferring (Standing)  They are able to get in to do hygiene, but hip adduction does make lower body dressing, dressing after toileting particularly difficult and impairs her transfers  ?  Pain related to the purpose of the visit: Not that her caregivers notice or that she expresses  ?  Treatments for Spasticity and Results of Treatments:   Stretching/exercise: Primarily PROM on a daily basis  Oral medications: Baclofen 10mg TID  Also noted to be on Ativan which really does make her very tired  Has tried Klonopin in the past, after some issues with changes in mental status, her depakote and klonopin were abruptly discontinued and her lithium was decreased  She's gone through many medication changes  Botulinum toxin injections: Never  Intrathecal baclofen therapy: Never   Other: L hand resting hand splint at night  ? Evolution of disability: She was independently ambulatory until July 2015, then started having trouble with stand to sit transfers - where she sustained a hip fracture that was treated non-operatively - and that's when her ambulation declined to walker with 1 person assist  Things have progressed since then  She had a volvulus and a surgery after that, both with extended periods of time gathering the strength to get back to ambulation with 1-2 person assist and a rolling walker  In July 2020 had a urinary tract infection, developed dysphagia - which still hasn't improved  Just had another swallow study which confirmed that she must maintain a Pureed/HTL due to silent aspiration   They eventually got her to functional ambulation short distances with heavy 1-2 person assist and a rolling walker, but then she dad another UTI in August 2021 and now requires wheelchair mobility around the clock  She even has trouble standing at this point  ?  Current functional status:   Stair Climbing: Can't do them, but doesn't need to  Ambulation: Now primarily dependent for wheelchair mobility  Toilet/Chair/Bed Transfer: Requires up to 2 people to assist - a stand pivot  Bowel Function: She wears depends  Does not indicate  Bladder Function: She wears depends  Does not indicate  She does Timed Void Q2hr  Bathing: Dependent   Dressing: Dependent  Grooming: Dependent  Feeding: Dependent  Vision: No new issues  Speech and Hearing: Used to be more verbal, but similar to her ambulation/mobility, she can say one word at times  She's not verbalizing at all today  Medical Problems: None since she was last seen by Dr Sony Dietrich and Thought Disturbance: Has issues with impulse control and bipolar disorder  Abilify has been decreased with some improvement in alertness and she has been more reactive  ?  Skin: No new issues  They use A&D when she wears a diaper overnight  ?  Nutritional status: appetite and weight are stable  Has recent VBS indicating need to stay on Pureed/HTL diet  Driving issues: Facility provides transport  Safety concerns regarding living situations and safety at home: No  Risk of falls: No falls since being primary wheelchair mobility  ? REVIEW OF SYSTEMS:  A 10 point review of systems was negative except for what is noted in the HPI  Please see attested ROS from MA  ? Review of Diagnostic Studies:  8/19/2021 CTH:  No acute intracranial abnormality  7/29/2020 MRI Brain  No acute intracranial abnormality    No restricted diffusion to suggest acute ischemia      Mild chronic small vessel ischemic changes and volume loss    ?  ?   Past Medical History:   Diagnosis Date    Adjustment disorder     Altered mental status 12/11/2015    Anemia     Bipolar 1 disorder (HCC)     Cerebral palsy (HCC)     Chronic hypernatremia 2/6/2016    Closed fracture of left hip (HCC) 1/19/2016    Closed left hip fracture (HCC)     no surgery    Constipation     Dehydration 2/20/2016    Diabetes mellitus ([facility] )     Disease of thyroid gland     Diverticulosis     Fracture of multiple ribs of right side     Hip fracture ([facility] ) 07/26/2015    left    Hyperlipidemia     Hypernatremia 12/28/2018    Hypotension     Impulse control disorder     Incontinence     Intellectual disability due to developmental disorder, unspecified     Microalbuminuria     Osteopathia     Osteoporosis     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[facility] )     Thrombocytopenia ([facility] ) 7/31/2015     Past Surgical History:   Procedure Laterality Date    ABDOMINAL SURGERY      COLECTOMY MIN      re-anastomosis 7/22/16    COLONOSCOPY N/A 7/21/2016    Procedure: COLONOSCOPY;  Surgeon: Livia Contreras MD;  [facility]; Service:     COLONOSCOPY N/A 1/31/2016    Procedure: COLONOSCOPY;  Surgeon: Livia Contreras MD;  [facility];  Service:     COLONOSCOPY N/A 7/25/2017    Procedure: COLONOSCOPY;  Surgeon: Livia Contreras MD;  [facility];   Service: Colorectal    COLOSTOMY      EXPLORATORY LAPAROTOMY W/ BOWEL RESECTION N/A 1/31/2016    Procedure: exploratory laparotomy, left sigmoidectomy, coloproctostomy, take down splenic flexure, loop colostomy;  Surgeon: Livia Contreras MD;  [facility];  Service:     AK CLOSE ENTEROSTOMY N/A 7/22/2016    Procedure: SEGMENTAL COLECTOMY WITH COLOCOLOSTOMY;  Surgeon: Livia Contreras MD;  [facility];  Service: Colorectal    UPPER GASTROINTESTINAL ENDOSCOPY       Social History     Socioeconomic History    Marital status: Single     Spouse name: Not on file    Number of children: Not on file    Years of education: Not on file    Highest education level: Not on file   Occupational History    Not on file   Tobacco Use    Smoking status: Never Smoker    Smokeless tobacco: Never Used   Vaping Use    Vaping Use: Never used   Substance and Sexual Activity    Alcohol use: Not Currently    Drug use: No    Sexual activity: Never   Other Topics Concern    Not on file   Social History Narrative    Lives in a group home      Social Determinants of Health     Financial Resource Strain: Low Risk     Difficulty of Paying Living Expenses: Not very hard   Food Insecurity: No Food Insecurity    Worried About Running Out of Food in the Last Year: Never true    Simran of Food in the Last Year: Never true   Transportation Needs: Not on file   Physical Activity: Not on file   Stress: Not on file   Social Connections: Not on file   Intimate Partner Violence: Not on file   Housing Stability: Not on file     Family History   Problem Relation Age of Onset    Alcohol abuse Mother     Alcohol abuse Father     Diabetes Sister     No Known Problems Sister     No Known Problems Sister      PHYSICAL EXAMINATION:  /70 (BP Location: Left arm, Patient Position: Sitting, Cuff Size: Standard)   Pulse 72   LMP 11/29/2009 (Exact Date)     Gen: No acute distress, appears much older than age, frail  HEENT: Moist mucus membranes, laterocollis to the R, with some extension  Cardiovascular: Regular rate, rhythm, S1/S2  Distal pulses palpable  Heme/Extr: No edema  Pulmonary: Non-labored breathing  Lungs CTAB  : No boateng  GI: Soft, non-tender, non-distended  Integumentary: Skin is warm, dry  Neuro: Seems lethargic today  Very fluctuant alertness is normal for her lately as per her caregiver  Difficult to check cranial nerves on this patient  She is non-verbal for me (minimally verbal these days, per her caregiver)  Difficult to tell her ability to feel sensation  Manipulation of her extremities results in moaning, which her caregiver says has always been the case, and does not necessarily indicate pain       She has a laterocollis to the right  She has L lumbrical tightness (MAS 4, possible contracture), and MAS 2-3 tone in at least her L elbow flexor/extensor, possibly also on the R  MAS 2-3 tone in her hip adductors  What I notice most in her exam are diffuse dystonias  She has tardive dyskinesia, with oromotor dystonia  She has dystonias that result in back extension and full shoulder scaption  She exhibits dystonias that result in elbow flexion/extension, R hand clenching (and at times her R hand/wrist are completely loose with absolutely no tone when she is not having dystonia)  Her feet will range from eversion to full inversion with difficulty moving them once they are postured, and a striatal toe bilaterally which is intermittent  When she is not engaged in these dystonic movements, she has very minimal tone except for what is noted above  Was able to demonstrate about 3-4/5 strength throughout at least      ASSESSMENT:  Diagnoses and all orders for this visit:    Dystonia    Parkinsonism, unspecified Parkinsonism type ([address] )    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[address] )    Spastic quadriplegic cerebral palsy ([address] )    Cervical dystonia      Ms Hemalatha Cruz is a 67yo F with what sounded like spastic diplegia cerebral palsy with toe walking, bipolar disorder/mood disturbance with impulse control, now with Parkinsonism (possibly secondary in part to psychiatric meds  While she does have some spasticity, what I notice overwhelmingly are dystonias  I am hesitant to make major adjustments to her medications  She has tried klonopin in the past (2011), but it is unclear if it resulted in [address]  She takes Ativan only at night, as taking it during the day completely knocks her out   She has been on Baclofen 10mg TID, which she seems to tolerate -we briefly discussed uptitrating the dose of her Baclofen, but her caregiver and I both have some reservation due to her sensitivity to medications, as well as to avoid making too many changes to too many meds - trying to isolate what helps with what and what makes things worse  She also clearly is developing a laterocollis, but after discussion with her caregiver, she requested to hold off on treating this with botox, as she is concerned about her dysphagia  Ultimately, we discussed the limitations of botox, and the tendency for dystonias to require higher doses  Botox would be ideal as it would not have any cognitive side effects, and works locally  Reviewed risk/benefits/alternatives  At this time my plan would be to target the following for highest yield:  L lumbricals (10 units x4) - to see if we can improve her range of motion in the L hand  BL EHL (to address striatal toe and risk of skin breakdown, as well as to help with her positioning when standing) (25 units each)  BL tibialis posterior (the inversion seems to be a bigger issue than the peroneals when they try to stand the patient for transnfers) (50 units each)   BL Hip adductors (50 units each)    For total 290 units  In the future, if interested, could also address laterocollis with ipsilateral L SCM, upper trap, and posterior scalenes (25 units each)  She is not currently on any blood thinners  Of note, will need to get consent signed by her POA, which is her   ? PLAN:  1  Oral spasticity medications: For now continue Baclofen 10mg TID  May want to titrate this up in the future as tolerated  2  Schedule 300 units  Transfers with 1-2 person assist  Would most likely need to perform in her chair  3  Continue therapies at her facility  4  Reached out to [id number] to let her know my impressions  I'm not sure if Judith Cast may be a candidate for DBS given the extent of her symptoms  5  Will route note to Dr Martinez Distance as well     ?  ?  *I have spent 45 minutes with Caregiver, Kamryn Ferris and patient today in which greater than 50% of this time was spent in counseling/coordination of care regarding Risks and benefits of tx options, Intructions for management, Patient and family education, Importance of tx compliance, Risk factor reductions and Impressions    ?  Bigg Samaniego MD  Physical Medicine and Rehabilitation

## 2022-04-19 NOTE — PATIENT INSTRUCTIONS
1  I will reach out to Amadeo Leone to let her know what I'm seeing in terms of the dystonic movements and we can discuss medication changes  It may just be a function of decreasing the abilify and increasing the sinemet that may be enough to help  2  Is a candidate for botulinum toxin  Would need to get consent from her   3  Will reach out to schedule botox once we have it in stock and approved by insurance and we have consent

## 2022-04-19 NOTE — PROGRESS NOTES
Review of Systems   Constitutional: Positive for fatigue  Negative for appetite change and fever  HENT: Negative  Negative for hearing loss, tinnitus, trouble swallowing and voice change  Eyes: Negative  Negative for photophobia and pain  Respiratory: Negative  Negative for shortness of breath  Cardiovascular: Negative  Negative for palpitations  Gastrointestinal: Negative  Negative for nausea and vomiting  Endocrine: Negative  Negative for cold intolerance  Genitourinary: Negative  Negative for dysuria, frequency and urgency  Musculoskeletal: Positive for gait problem  Negative for myalgias and neck pain  Spastic     Skin: Negative  Negative for rash  Allergic/Immunologic: Negative  Neurological: Positive for speech difficulty  Negative for dizziness, tremors, seizures, syncope, facial asymmetry, weakness, light-headedness, numbness and headaches  Hematological: Negative  Does not bruise/bleed easily  Psychiatric/Behavioral: Negative  Negative for confusion, hallucinations and sleep disturbance  All other systems reviewed and are negative

## 2022-04-21 ENCOUNTER — DOCUMENTATION (OUTPATIENT)
Dept: FAMILY MEDICINE CLINIC | Facility: CLINIC | Age: 62
End: 2022-04-21

## 2022-04-21 NOTE — PROGRESS NOTES
Discussed with Angelina Ovalles and reviewed her recent blood glucose after changing her recent diabetes regimen  After discussion, will discontinue Linagliptin and continue Glipizide 2 5mg q daily  Recommend checking her fasting blood glucose daily and advised to send report of blood glucose to office to reevaluate

## 2022-04-22 ENCOUNTER — OFFICE VISIT (OUTPATIENT)
Dept: NEUROLOGY | Facility: CLINIC | Age: 62
End: 2022-04-22
Payer: MEDICARE

## 2022-04-22 ENCOUNTER — TELEPHONE (OUTPATIENT)
Dept: FAMILY MEDICINE CLINIC | Facility: CLINIC | Age: 62
End: 2022-04-22

## 2022-04-22 VITALS
SYSTOLIC BLOOD PRESSURE: 110 MMHG | DIASTOLIC BLOOD PRESSURE: 78 MMHG | BODY MASS INDEX: 21.95 KG/M2 | WEIGHT: 105 LBS | TEMPERATURE: 97.9 F

## 2022-04-22 DIAGNOSIS — G82.50 SPASTIC QUADRIPARESIS (HCC): ICD-10-CM

## 2022-04-22 DIAGNOSIS — G80.0 SPASTIC QUADRIPLEGIC CEREBRAL PALSY (HCC): Primary | ICD-10-CM

## 2022-04-22 DIAGNOSIS — G24.9 DYSTONIA: ICD-10-CM

## 2022-04-22 DIAGNOSIS — R25.2 SPASTICITY: ICD-10-CM

## 2022-04-22 DIAGNOSIS — G20 PARKINSONISM, UNSPECIFIED PARKINSONISM TYPE (HCC): ICD-10-CM

## 2022-04-22 PROCEDURE — 99214 OFFICE O/P EST MOD 30 MIN: CPT | Performed by: NURSE PRACTITIONER

## 2022-04-22 NOTE — PROGRESS NOTES
Patient ID: Cate Lawton is a 63 y o  female  Assessment/Plan:    Parkinsonism [facility])  Patient returns for follow-up for parkinsonism  With continued reduction of her Abilify and slight increase in her Sinemet they have noted improvement of symptoms  She has had improvement in her tremor  She is more awake and responsive, more facial expression  Staff tells me she has had oral dyskinesias for the last 14 years and are currently unchanged  Still does continue to have difficulty walking and can only ambulate 5-10 feet all holding on to staff  They find that her legs are harder to bend at times and that her balance continues to be poor all of which have worsened since she had a UTI in August of 2021 along with the start of her other parkinsonian features  She does have appointment in the next few weeks with psych in which they will discuss if Abilify can be discontinued  If discontinued we can continue to monitor for any change in symptoms we did discuss if psych was not going to change her Abilify we could increase her Sinemet to 1 5 tabs t i d  Given she does has some ongoing symptoms and some dystonic posturing noted on exam     Spasticity  Patient did follow-up with PM&R for spasticity  There is question if some of this was more so dystonia than spasticity  On exam she did have dystonic posturing of the right wrist as there were times where she had normal tone during the exam   She did also have involuntary inversion and eversion of the feet with dystonic posturing  and striatal  posturing of the toes  She did have notable torticollis  And spasticity of the left upper extremity  We did discuss the difference between dystonia and spasticity with staff today    They do feel that she could benefit from Botox injections for the spasticity in the LUE and upper legs as was discussed with PM&R, but would be agreeable to see if medication changes to the Abilify and possibly Sinemet would help to improve her dystonia before seeking Botox injections for this  They do not wish to treat torticollis with botox due to risk of dysphagia as patient already has underlying dysphagia  Diagnoses and all orders for this visit:    Spastic quadriplegic cerebral palsy ([address] )    Parkinsonism, unspecified Parkinsonism type ([address] )    Dystonia    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[address] )    Spasticity           Subjective:    SHAYNA Johnson is a 63 y o  female with a PMH of intellectual disability 2/2 cerebral palsy, diabetes, and dysphagia presenting for follow up      To review, she has followed with our epilepsy team since 2016 for history of seizure like episodes   She was last seen 10/2021 in which her largest concern was increased tremors, and gait changes, possible "freezing" Her exam did show significant spasticity  Symptoms worsening over the past few months  She was started on a trial of sinemet, she was referred to PM&R due to spasticity  Last office visit 3/2021 in which symptoms had improved with sinemet-plan was to increase  Plan was for psych to continue to decrease abilify  She did see PM&R for spasticity but felt was more so dystonia-did discuss botox injections given patient has sedation side effects from medications       Current Medication:  abilify 5 mg QD  Sinemet 25/100 mg 1 tab TID  Baclofen 10 mg TID     Interval History:  She presents today with one the nursing staff at her facility [facility]  Since start of sinemet and decrease of abilify-  Has been more awake and responsive  More facial expression  Tremor improved as well-still has slight tremor in right hand was previously "flapping" as per staff  Plan is to try to eliminate abilify perhaps in may when they have next follow up  Staff does tell me that she has had the lip and tongue movements have been present for the last 14 years and are unchanged  She is currently on honey thick puree diet and does need assistance with feeds     She sleeps well with trazodone and ativan  Prior to august 2021 she was walking with walker 1-2 assist   Currently she does ambulate 5-10 feet with holding onto staff  They do find her legs are hard to bend at times but sometimes are easier to move  Her balance continues to be poor and does not have the ability to recover if she is off balance          She did have a CT head performed 8/2021 with no acute changes, however did show Decreased attenuation is noted in periventricular and subcortical white matter demonstrating an appearance that is statistically most likely to represent mild microangiopathic change; this appearance is similar when compared to most recent   prior examination   Chronic lacunar infarction(s) are noted in basal ganglia, unchanged from prior exam          Objective:    Blood pressure 110/78, temperature 97 9 °F (36 6 °C), weight 47 6 kg (105 lb), last menstrual period 11/29/2009, not currently breastfeeding  Physical Exam  Constitutional:       General: She is awake  Eyes:      General: Lids are normal       Extraocular Movements: Extraocular movements intact  Pupils: Pupils are equal, round, and reactive to light  Neurological:      Mental Status: She is alert  Deep Tendon Reflexes: Reflexes are normal and symmetric  Neurological Exam  Mental Status  Awake and alert  Speech: nonverbal  Follows one-step commands  Cranial Nerves  CN III, IV, VI: Extraocular movements intact bilaterally  Normal lids and orbits bilaterally  Pupils equal round and reactive to light bilaterally  CN V: Facial sensation is normal   CN VII: Full and symmetric facial movement  CN VIII: Hearing is normal   CN XI: Shoulder shrug strength is normal   CN XII: Tongue midline without atrophy or fasciculations      Motor    Moves all extremities antigravity normal  strength b/l, intermittently follows commands so had difficulty assessing full strength but appears equal     Sensory  Light touch is normal in upper and lower extremities  Reflexes  Deep tendon reflexes are 2+ and symmetric in all four extremities with downgoing toes bilaterally  Coordination  Occasional resting tremor in right hand, no postural tremor,  Unable to perform CARLYLE but overall mild bradykinesia  She did have spasticity in the UE along with toritcollis  She did have dystonic posturing at times in the right wrist  She did have some abnormal posturing in the left wrist when she was agitated so unclear if this is behavioral or not  She did have involuntary eversion and posturing of b/l feet that appears dystonic, stiral posturing of the toes in b/l feet intermittently       Gait  Patient presented in wheel chair, not ambulated due to safety concerns         I have personally reviewed the ROS performed by the MA     ROS:    Review of Systems   Constitutional: Positive for fatigue (Last week due to stomach bug and Wednesday was first day back to her normal self)  Negative for appetite change and fever  HENT: Negative  Negative for hearing loss, tinnitus, trouble swallowing and voice change  Eyes: Negative  Negative for photophobia and pain  Respiratory: Negative  Negative for shortness of breath  Cardiovascular: Negative  Negative for palpitations  Gastrointestinal: Negative  Negative for nausea and vomiting  Endocrine: Negative  Negative for cold intolerance  Genitourinary: Negative  Negative for dysuria, frequency and urgency  Musculoskeletal: Positive for gait problem  Negative for myalgias and neck pain  Balance Issues  Stiffness in arm/hand has gotten better but still an issue  Dystonic movement as stated by Dr Eulalia Chavez, care taker thinking it is spasticity   Skin: Negative  Negative for rash  Allergic/Immunologic: Negative  Neurological: Positive for tremors (Slight Tremor in right hand), speech difficulty (Not as responsive) and weakness   Negative for dizziness, seizures, syncope, facial asymmetry, light-headedness, numbness and headaches  Hematological: Negative  Does not bruise/bleed easily  Psychiatric/Behavioral: Negative  Negative for confusion, hallucinations and sleep disturbance  All other systems reviewed and are negative

## 2022-04-22 NOTE — PATIENT INSTRUCTIONS
Continue current dose of sinemet  Discuss dose of abilify with psych at appt  If no change to abilify could consider increase in sinemet to 1 5 tabs TID     Recommend follow up with [facility] for possible botox, I will discuss with Dr Hector Damon

## 2022-04-22 NOTE — TELEPHONE ENCOUNTER
Covid Test Results from:      [place] Department of [facility]     [address] [place] [address] & [address]      [phone] (R)      [phone] (f)    Scanned to chart   See attachment

## 2022-04-24 NOTE — ASSESSMENT & PLAN NOTE
Patient returns for follow-up for parkinsonism  With continued reduction of her Abilify and slight increase in her Sinemet they have noted improvement of symptoms  She has had improvement in her tremor  She is more awake and responsive, more facial expression  Staff tells me she has had oral dyskinesias for the last 14 years and are currently unchanged  Still does continue to have difficulty walking and can only ambulate 5-10 feet all holding on to staff  They find that her legs are harder to bend at times and that her balance continues to be poor all of which have worsened since she had a UTI in August of 2021 along with the start of her other parkinsonian features  She does have appointment in the next few weeks with psych in which they will discuss if Abilify can be discontinued  If discontinued we can continue to monitor for any change in symptoms we did discuss if psych was not going to change her Abilify we could increase her Sinemet to 1 5 tabs t i d    Given she does has some ongoing symptoms and some dystonic posturing noted on exam

## 2022-04-24 NOTE — ASSESSMENT & PLAN NOTE
Patient did follow-up with PM&R for spasticity  There is question if some of this was more so dystonia than spasticity  On exam she did have dystonic posturing of the right wrist as there were times where she had normal tone during the exam   She did also have involuntary inversion and eversion of the feet with dystonic posturing  and striatal  posturing of the toes  She did have notable torticollis  And spasticity of the left upper extremity  We did discuss the difference between dystonia and spasticity with staff today  They do feel that she could benefit from Botox injections for the spasticity in the LUE and upper legs as was discussed with PM&R, but would be agreeable to see if medication changes to the Abilify and possibly Sinemet would help to improve her dystonia before seeking Botox injections for this  They do not wish to treat torticollis with botox due to risk of dysphagia as patient already has underlying dysphagia

## 2022-04-26 ENCOUNTER — DOCUMENTATION (OUTPATIENT)
Dept: FAMILY MEDICINE CLINIC | Facility: CLINIC | Age: 62
End: 2022-04-26

## 2022-04-26 NOTE — PROGRESS NOTES
Update from documentation from 4/21/22    Discussed with Lisa from [facility] and reviewed Terrie's recent blood glucose log after changing her diabetes regimen  After discussion, will discontinue Linagliptin and continue Glipizide 1 mg q daily  Recommend checking her fasting blood glucose daily and advised to send report of blood glucose to office to reevaluate

## 2022-05-09 ENCOUNTER — TELEPHONE (OUTPATIENT)
Dept: LAB | Facility: HOSPITAL | Age: 62
End: 2022-05-09

## 2022-05-10 NOTE — TELEPHONE ENCOUNTER
5/10 - scheduled Terrie for 6/22 and 7/13 fasting bloodwork - and [facility]  5/18 and 6/22 fasting bloodwork

## 2022-05-10 NOTE — TELEPHONE ENCOUNTER
5/10 - returned call to [facility] - she had to step out said to call back after 11am

## 2022-05-27 ENCOUNTER — TELEPHONE (OUTPATIENT)
Dept: NEUROLOGY | Facility: CLINIC | Age: 62
End: 2022-05-27

## 2022-06-03 ENCOUNTER — TELEPHONE (OUTPATIENT)
Dept: NEUROLOGY | Facility: CLINIC | Age: 62
End: 2022-06-03

## 2022-06-03 NOTE — TELEPHONE ENCOUNTER
Submitted prior-authorization for  New-Start Botox-300 units, [id number] via fax to [employer] Goncal[employer]  Awaiting approval/denial response status

## 2022-06-22 ENCOUNTER — APPOINTMENT (OUTPATIENT)
Dept: LAB | Facility: HOSPITAL | Age: 62
End: 2022-06-22
Payer: MEDICARE

## 2022-06-22 DIAGNOSIS — M81.0 OSTEOPOROSIS, UNSPECIFIED OSTEOPOROSIS TYPE, UNSPECIFIED PATHOLOGICAL FRACTURE PRESENCE: ICD-10-CM

## 2022-06-22 DIAGNOSIS — E11.00 TYPE 2 DIABETES MELLITUS WITH HYPEROSMOLARITY WITHOUT COMA, WITHOUT LONG-TERM CURRENT USE OF INSULIN (HCC): ICD-10-CM

## 2022-06-22 LAB
ANION GAP SERPL CALCULATED.3IONS-SCNC: 6 MMOL/L (ref 4–13)
BUN SERPL-MCNC: 12 MG/DL (ref 5–25)
CALCIUM SERPL-MCNC: 9.7 MG/DL (ref 8.3–10.1)
CHLORIDE SERPL-SCNC: 105 MMOL/L (ref 100–108)
CO2 SERPL-SCNC: 31 MMOL/L (ref 21–32)
CREAT SERPL-MCNC: 0.97 MG/DL (ref 0.6–1.3)
EST. AVERAGE GLUCOSE BLD GHB EST-MCNC: 154 MG/DL
GFR SERPL CREATININE-BSD FRML MDRD: 63 ML/MIN/1.73SQ M
GLUCOSE SERPL-MCNC: 122 MG/DL (ref 65–140)
HBA1C MFR BLD: 7 %
POTASSIUM SERPL-SCNC: 4.4 MMOL/L (ref 3.5–5.3)
SODIUM SERPL-SCNC: 142 MMOL/L (ref 136–145)

## 2022-06-22 PROCEDURE — 83036 HEMOGLOBIN GLYCOSYLATED A1C: CPT

## 2022-06-22 PROCEDURE — 36415 COLL VENOUS BLD VENIPUNCTURE: CPT

## 2022-06-22 PROCEDURE — 80048 BASIC METABOLIC PNL TOTAL CA: CPT

## 2022-06-29 ENCOUNTER — TELEPHONE (OUTPATIENT)
Dept: FAMILY MEDICINE CLINIC | Facility: CLINIC | Age: 62
End: 2022-06-29

## 2022-06-29 ENCOUNTER — TELEPHONE (OUTPATIENT)
Dept: NEUROLOGY | Facility: CLINIC | Age: 62
End: 2022-06-29

## 2022-06-29 ENCOUNTER — OFFICE VISIT (OUTPATIENT)
Dept: FAMILY MEDICINE CLINIC | Facility: CLINIC | Age: 62
End: 2022-06-29

## 2022-06-29 VITALS
SYSTOLIC BLOOD PRESSURE: 128 MMHG | HEIGHT: 58 IN | DIASTOLIC BLOOD PRESSURE: 86 MMHG | BODY MASS INDEX: 23.93 KG/M2 | OXYGEN SATURATION: 91 % | HEART RATE: 76 BPM | RESPIRATION RATE: 16 BRPM | TEMPERATURE: 97.7 F | WEIGHT: 114 LBS

## 2022-06-29 DIAGNOSIS — E78.5 HYPERLIPIDEMIA, UNSPECIFIED HYPERLIPIDEMIA TYPE: ICD-10-CM

## 2022-06-29 DIAGNOSIS — M81.0 OSTEOPOROSIS, UNSPECIFIED OSTEOPOROSIS TYPE, UNSPECIFIED PATHOLOGICAL FRACTURE PRESENCE: ICD-10-CM

## 2022-06-29 DIAGNOSIS — E11.69 TYPE 2 DIABETES MELLITUS WITH OTHER SPECIFIED COMPLICATION, WITHOUT LONG-TERM CURRENT USE OF INSULIN (HCC): Primary | ICD-10-CM

## 2022-06-29 DIAGNOSIS — F31.9 BIPOLAR AFFECTIVE DISORDER, REMISSION STATUS UNSPECIFIED (HCC): ICD-10-CM

## 2022-06-29 PROCEDURE — 99214 OFFICE O/P EST MOD 30 MIN: CPT | Performed by: FAMILY MEDICINE

## 2022-06-29 RX ORDER — ARIPIPRAZOLE 2 MG/1
TABLET ORAL
COMMUNITY
Start: 2022-06-08

## 2022-06-29 NOTE — TELEPHONE ENCOUNTER
Hello Team, I spoke with pt's care giver [place]  She request to r/s the botox appointment from 09/06/22 to next available 10/25/22  Scheduled: 10/25/22 @ 1 PM with Dr Gilles Correa in [place]  Thank you

## 2022-06-29 NOTE — ASSESSMENT & PLAN NOTE
Lab Results   Component Value Date    HGBA1C 7 0 (H) 06/22/2022     Improving   Prior A1c 7 6 > 7 0  Current regimen:  Metformin 1000 mg BID and glimepiride 1 mg daily  SGLT 2 inhibitors contraindicated due to history of recurrent UTI  Recently discontinued linagliptin 5 mg daily as there was no notable improvement in glucose levels    Previously on glipizide 2 5 mg daily but discontinued as the pills could not be crushed and the patient has history of dysphagia/GERD  Most recent glucose log shows fasting glucose range   No evidence of hypoglycemia per caregiver  · Continue current regimen, no changes will be made as glucose levels appear to be stable with improving A1c level  · Will reevaluate and follow-up in 3 months along with NAVYA

## 2022-06-29 NOTE — PROGRESS NOTES
[facility] 63 y o  female MRN: [id number]  Encounter: [id number],  Primary Care Provider: David Em MD      Date: 06/29/22    Assessments & Plans:     Problem List Items Addressed This Visit        Endocrine    Type 2 diabetes mellitus, without long-term current use of insulin ([facility] ) - Primary       Lab Results   Component Value Date    HGBA1C 7 0 (H) 06/22/2022     Improving   Prior A1c 7 6 > 7 0  Current regimen:  Metformin 1000 mg BID and glimepiride 1 mg daily  SGLT 2 inhibitors contraindicated due to history of recurrent UTI  Recently discontinued linagliptin 5 mg daily as there was no notable improvement in glucose levels  Previously on glipizide 2 5 mg daily but discontinued as the pills could not be crushed and the patient has history of dysphagia/GERD  Most recent glucose log shows fasting glucose range   No evidence of hypoglycemia per caregiver  Continue current regimen, no changes will be made as glucose levels appear to be stable with improving A1c level  Will reevaluate and follow-up in 3 months along with MAW           Relevant Orders    Comprehensive metabolic panel    Microalbumin / creatinine urine ratio    HEMOGLOBIN A1C W/ EAG ESTIMATION       Musculoskeletal and Integument    Osteoporosis     History of osteoporosis confirmed with a T-score of -3 1 in the right hip on last DEXA scan in 03/2020  Also has history of left hip fracture  Currently on Calcium 600mg BID and Vitamin D 2000U daily  Also on Prolia   Not on bisphosphonates due to hx of GERD/dysphagia  Was scheduled to get Prolia today but due to lack of staffing, will reschedule Prolia for tomorrow   Labs reviewed, Calcium and GFR wnl   Continue current regimen, will review Vit D level in next f/u visit/MAW  Scheduled for DEXA scan 10/2022           Relevant Orders    Comprehensive metabolic panel    TSH, 3rd generation    Vitamin D 25 hydroxy       Other Hyperlipidemia    Relevant Orders    Lipid panel    Bipolar disorder (HCC)    Relevant Medications    ARIPiprazole (ABILIFY) 2 mg tablet    Other Relevant Orders    CK (with reflex to MB)    Valproic acid level, total    CBC and differential          Patient Active Problem List   Diagnosis    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[facility] )    Hyperlipidemia    Gastroesophageal reflux disease    Cerebral palsy ([facility] )    Spasticity    Ambulatory dysfunction    Recurrent UTI (urinary tract infection)    Bipolar disorder (HCC)    Severe Intellectual disability    Type 2 diabetes mellitus, without long-term current use of insulin ([facility])    Abnormal albumin    Iron deficiency anemia    Toxic metabolic encephalopathy    Anxiety    Atopic dermatitis    Chondrodermatitis nodularis helicis of left ear    Constipation    Dry eye    Gait abnormality    Hypotension    Hypothyroidism    Menopause    Osteoporosis    Other chronic pain    Peripheral neuropathy    Physical deconditioning    Resting tremor    Seasonal allergies    Urinary incontinence    Osteoarthritis of both hips    Gait disturbance    Severe sepsis ([facility])    Hypernatremia    Breast asymmetry    Abdominal bloating    History of fall    Screening for colon cancer    History of vitamin D deficiency    Acute cystitis without hematuria    Decreased appetite    Dysphagia    Underweight    Eosinophilic leukocytosis    Macrocytic anemia    Exposure to COVID-19 virus    Abnormal finding on lung imaging    Herpes zoster without complication    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[facility])    Polyneuropathy associated with underlying disease ([facility] )    Parkinsonism ([facility] )    Dystonia    Cervical dystonia         Recent Visits:     Recent Visits  No visits were found meeting these conditions    Showing recent visits within past 7 days and meeting all other requirements  Today's Visits  Date Type Provider Dept   06/29/22 Telephone MD Carlitos Lopez   06/29/22 Office Visit Tory Daigle MD  Dorys Yañez   Showing today's visits and meeting all other requirements  Future Appointments  No visits were found meeting these conditions  Showing future appointments within next 150 days and meeting all other requirements      Subjective:     Chief Complaint   Patient presents with    Osteoporosis    Diabetes       HPI:  63-year-old female presenting for follow-up of osteoporosis and diabetes  History provided by caregiver  Past medical history of 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, parkinsonian symptoms, bipolar on valproic acid, spasticity/torticollis, osteoporosis, diabetes, GERD/dysphasia, hypothyroidism  Per chart review, patient is currently on metformin 1000 mg b i d  and glimepiride 1 mg daily with recent A1c 7 0 (6/22)  Per caregiver, the patient has been compliant with her medications and most recent glucose log from June shows fasting glucose range   Deny signs of hypoglycemia  In regards to history of osteoporosis and left hip fracture, patient last had DEXA scan on 3/2020 showing T-score of -3 1 of right hip, -1 6 in left hip  10 year risk of hip fracture is 6%, with the 10 year risk of major osteoporotic fracture being 20%, as calculated by the [facility] fracture risk assessment tool (FRAX)  She is scheduled for her next DEXA scan in 10/2022  Patient is currently on Vitamin D, Calcium supplementation and Prolia  The patient was scheduled for her Prolia today but due to lack of staffing, will reschedule for tomorrow  Otherwise, she is compliant with the vitamin D and calcium supplementation daily  She is due for vitamin-D check along with her other annual labs in 3 months  The caregiver requested the labs be placed now so she can get them done just before the next visit         Review of System:     Review of systems was reviewed and negative unless stated above in HPI/24-hour events     History:     Past Medical History:   Diagnosis Date    Adjustment disorder  Altered mental status 12/11/2015    Anemia     Bipolar 1 disorder (HCC)     Cerebral palsy (HCC)     Chronic hypernatremia 2/6/2016    Closed fracture of left hip ([facility] ) 1/19/2016    Closed left hip fracture (HCC)     no surgery    Constipation     Dehydration 2/20/2016    Diabetes mellitus ([facility] )     Disease of thyroid gland     Diverticulosis     Fracture of multiple ribs of right side     Hip fracture ([facility] ) 07/26/2015    left    Hyperlipidemia     Hypernatremia 12/28/2018    Hypotension     Impulse control disorder     Incontinence     Intellectual disability due to developmental disorder, unspecified     Microalbuminuria     Osteopathia     Osteoporosis     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[facility] )     Thrombocytopenia ([facility] ) 7/31/2015     Past Surgical History:   Procedure Laterality Date    ABDOMINAL SURGERY      COLECTOMY MIN      re-anastomosis 7/22/16    COLONOSCOPY N/A 7/21/2016    Procedure: COLONOSCOPY;  Surgeon: Alberta Barney MD;  [facility]; Service:     COLONOSCOPY N/A 1/31/2016    Procedure: COLONOSCOPY;  Surgeon: Alberta Barney MD;  [facility];  Service:     COLONOSCOPY N/A 7/25/2017    Procedure: COLONOSCOPY;  Surgeon: Alberta Barney MD;  [facility];   Service: Colorectal    COLOSTOMY      EXPLORATORY LAPAROTOMY W/ BOWEL RESECTION N/A 1/31/2016    Procedure: exploratory laparotomy, left sigmoidectomy, coloproctostomy, take down splenic flexure, loop colostomy;  Surgeon: Alberta Barney MD;  [facility];  Service:     NV CLOSE ENTEROSTOMY N/A 7/22/2016    Procedure: SEGMENTAL COLECTOMY WITH COLOCOLOSTOMY;  Surgeon: Alberta Barney MD;  [facility];  Service: Colorectal    UPPER GASTROINTESTINAL ENDOSCOPY       Social History   Social History     Substance and Sexual Activity   Alcohol Use Not Currently     Social History     Substance and Sexual Activity   Drug Use No     Social History     Tobacco Use   Smoking Status Never Smoker   Smokeless Tobacco Never Used       Medications & Allergies: Allergies   Allergen Reactions    Keflex [Cephalexin] GI Intolerance       PTA Medications:  Current Outpatient Medications on File Prior to Visit   Medication Sig Dispense Refill    acetaminophen (TYLENOL) 325 mg tablet TAKE 2 TABLETS BY MOUTH (650MG) EVERY 6 HOURS AS NEEDED FOR PAIN / FEVER GREATER THAN 100 4 10 tablet 11    ARIPiprazole (ABILIFY) 2 mg tablet       baclofen 10 mg tablet Take 1 tablet (10 mg total) by mouth 3 (three) times a day 90 tablet 11    benzonatate (TESSALON PERLES) 100 mg capsule Take 1 capsule (100 mg total) by mouth every 8 (eight) hours as needed for cough 30 capsule 0    calcium carbonate (OS-MORRIS) 600 MG tablet Take 1 tablet (600 mg total) by mouth 2 (two) times a day with meals 60 tablet 2    carbamide peroxide (DEBROX) 6 5 % otic solution Administer 2 drops into both ears daily as needed 2 drops in the affected ear prn x 5 days      carbidopa-levodopa (Sinemet)  mg per tablet Take 1 tablet by mouth 3 (three) times a day 120 tablet 3    carboxymethylcellulose (REFRESH PLUS) 0 5 % SOLN 1 drop 3 (three) times a day as needed for dry eyes      Citalopram Hydrobromide (CELEXA PO) Take 40 mg by mouth every morning        divalproex sodium (DEPAKOTE SPRINKLE) 125 MG capsule 4 (four) times a day      docosanol (ABREVA) 10 % Apply topically 5 (five) times a day Apply and rub in 5x a day until healed as needed for lesion  0    ferrous sulfate 324 (65 Fe) mg Take 325 mg by mouth 2 (two) times a day        fexofenadine (ALLEGRA) 180 MG tablet Take 180 mg by mouth as needed        fluticasone (FLONASE) 50 mcg/act nasal spray 1 spray into each nostril daily as needed for rhinitis or allergies 1 Bottle 1    glimepiride (AMARYL) 1 mg tablet Take 1 tablet (1 mg total) by mouth daily with breakfast 90 tablet 3    guaiFENesin (ROBITUSSIN) 100 MG/5ML oral liquid Take 200 mg by mouth every 8 (eight) hours as needed 2 tsp every 8 hours as needed for cough      hydrocortisone 1 % cream Apply topically to affected area twice daily as needed for rash 30 g 0    levothyroxine (SYNTHROID) 25 mcg tablet Take 1 tablet (25 mcg) by mouth every morning  0    linaGLIPtin (Tradjenta) 5 MG TABS Take 5 mg by mouth daily      LORazepam (ATIVAN) 0 5 mg tablet Take 0 5 mg by mouth daily at bedtime        metFORMIN (GLUCOPHAGE) 1000 MG tablet Take 1 tablet (1,000 mg total) by mouth 2 (two) times a day with meals 60 tablet 2    neomycin-bacitracin-polymyxin b (NEOSPORIN) ointment Apply 1 application topically 2 (two) times a day as needed      nitrofurantoin (MACRODANTIN) 50 mg capsule Take 50 mg by mouth daily at bedtime      olopatadine HCl (PATADAY) 0 2 % opth drops Administer 1 drop to both eyes as needed      Oxybutynin Chloride (DITROPAN XL PO) Take 5 mg by mouth 3 (three) times a day       polyethylene glycol (GLYCOLAX) 17 GM/SCOOP powder DISSOLVE 17GM (1 CAPFUL) IN 8 OZ FLUIDS AND CONSUME BY MOUTH EVERY MORNING *HOLD 1 DAY FOR LOOSE STOOLS* (CONSTIPATION) 510 g 10    simvastatin (ZOCOR) 20 mg tablet Take 1 tablet (20 mg total) by mouth daily with dinner at 4 pm for hyperlipidemia      Starch, Thickening, POWD Take by mouth daily Use on food and liquid as directed 1020 g 3    traZODone (DESYREL) 100 mg tablet Take 1 tablet (100 mg total) by mouth daily at  8 pm for depression  0    Vitamins A & D (VITAMIN A & D) ointment Apply topically as needed for dry skin      [DISCONTINUED] ARIPiprazole (ABILIFY) 30 mg tablet Take 1 tablet (30 mg) by mouth daily at 8 pm (Patient taking differently: 2 mg Take 1 tablet  by mouth daily at 8 pm)  0    denosumab (PROLIA) 60 mg/mL Inject 1 mL (60 mg total) under the skin once for 1 dose 1 mL 1    omeprazole (PriLOSEC) 20 mg delayed release capsule Take 1 capsule (20 mg total) by mouth 2 (two) times a day before meals 60 capsule 2    valACYclovir (VALTREX) 1,000 mg tablet Take 1 tablet (1,000 mg total) by mouth 3 (three) times a day for 7 days (Patient not taking: Reported on 1/21/2022 ) 21 tablet 0    [DISCONTINUED] linaGLIPtin 5 MG TABS Take 5 mg by mouth daily 90 tablet 0    [DISCONTINUED] valproic acid (DEPAKENE) 250 MG/5ML soln TAKE 2 TEASPOONSFUL (10ML) (500MG) BY MOUTH TWICE A DAY (BIPOLAR) 473 mL 10     No current facility-administered medications on file prior to visit  Objective & Vitals:   Vitals: /86 (BP Location: Right arm, Patient Position: Sitting, Cuff Size: Standard) Comment: manual  Pulse 76   Temp 97 7 °F (36 5 °C) (Temporal)   Resp 16   Ht 4' 10" (1 473 m)   Wt 51 7 kg (114 lb)   LMP 11/29/2009 (Exact Date)   SpO2 91%   BMI 23 83 kg/m²       Physical Exam:     Physical Exam  Vitals reviewed  Exam conducted with a chaperone present  Constitutional:       General: She is not in acute distress  Appearance: She is not toxic-appearing  HENT:      Head: Normocephalic and atraumatic  Nose: Nose normal    Eyes:      Extraocular Movements: Extraocular movements intact  Conjunctiva/sclera: Conjunctivae normal    Cardiovascular:      Rate and Rhythm: Normal rate and regular rhythm  Heart sounds: Normal heart sounds  No murmur heard  Pulmonary:      Effort: Pulmonary effort is normal  No respiratory distress  Breath sounds: No wheezing  Comments: Coarse breath sounds in bibasilar regions  Abdominal:      General: Abdomen is flat  Bowel sounds are normal       Palpations: Abdomen is soft  Musculoskeletal:      Comments: Musculature demonstrates spasticity and rigidity, especially in the distal extremities, no tremors   Skin:     General: Skin is warm and dry  Neurological:      Mental Status: She is alert             Future Labs & Appointments:     FUTURE LABS:  Lab Frequency Next Occurrence   Mammo screening bilateral w 3d & cad Once 09/17/2021   Mammo screening bilateral w cad Once 96/94/5990   Basic metabolic panel Once 07/01/2022   DXA bone density spine hip and pelvis Once 10/04/2022       FUTURE CONFIRMED APPOINTMENTS:  Future Appointments   Date Time Provider [place]   6/30/2022 11:30 AM [place] [address] FP [facility] [address]   7/13/2022  8:30 AM MOBILE LAB DROP OFF ROOM BE [facility] BE MOBILE   8/26/2022  8:15 AM LEATHA Rodriguez [facility]URO [facility]   9/8/2022  8:30 AM BE DEXA SHA SLN 1 BE SLN Dexa BE [facility]   9/21/2022  1:20 PM Aramis Vargas MD [address] FP BE [address]   10/25/2022  1:00 PM Desiree Harman MD [facility]   12/19/2022 10:20 AM BE MAMMO SLN 2 BE SLN Mammo BE [place]       Aramis Vargas MD  PGY-1, Family Medicine  06/29/22  5:51 PM      Dear reader, please be aware that portions of my note contain dictated text  I have done my best to proof-read this note prior to signing  However, there may be occasional unnoticed errors pertaining to "sound-alike" words and/or grammar during my dictation process  If there is any words or information that is unclear or appears erroneous, please kindly let me know and I will clarify and/or addend my notes accordingly  Thank you for your understanding

## 2022-06-29 NOTE — ASSESSMENT & PLAN NOTE
History of osteoporosis confirmed with a T-score of -3 1 in the right hip on last DEXA scan in 03/2020  Also has history of left hip fracture  Currently on Calcium 600mg BID and Vitamin D 2000U daily  Also on Prolia   Not on bisphosphonates due to hx of GERD/dysphagia  Was scheduled to get Prolia today but due to lack of staffing, will reschedule Prolia for tomorrow   Labs reviewed, Calcium and GFR wnl   · Continue current regimen, will review Vit D level in next f/u visit/MAW  · Scheduled for DEXA scan 10/2022

## 2022-06-29 NOTE — PATIENT INSTRUCTIONS
Osteoporosis   WHAT YOU NEED TO KNOW:   Osteoporosis is a long-term medical condition that causes your bones to become weak, brittle, and more likely to fracture  Osteoporosis occurs when your body absorbs more bone than it makes  It is also caused by a lack of calcium and estrogen (female hormone)  DISCHARGE INSTRUCTIONS:   Call your doctor if:   You have severe pain  You have increasing pain after a fall  You have pain when you do your daily activities  You have questions or concerns about your condition or care  Medicines:   Medicines  may be given to prevent bone loss, build new bone, and increase estrogen  These medicines may be given as a pill or injection  Ask your healthcare provider for more information  Take your medicine as directed  Contact your healthcare provider if you think your medicine is not helping or if you have side effects  Tell him of her if you are allergic to any medicine  Keep a list of the medicines, vitamins, and herbs you take  Include the amounts, and when and why you take them  Bring the list or the pill bottles to follow-up visits  Carry your medicine list with you in case of an emergency  Prevent bone loss:       Eat healthy foods that are high in calcium  This helps keep your bones strong  Good sources of calcium are milk, cheese, broccoli, tofu, almonds, and canned salmon and sardines  Increase your vitamin D intake  Vitamin D is in fish oils, some vegetables, and fortified milk, cereal, and bread  Vitamin D is also formed in the skin when it is exposed to the sun  Ask your healthcare provider how much sunlight is safe for you  Drink liquids as directed  Ask your healthcare provider how much liquid to drink each day and which liquids are best for you  Do not have alcohol or caffeine  They decrease bone mineral density, which can weaken your bones  Exercise regularly  Ask your healthcare provider about the best exercise plan for you  Weight bearing exercise for 30 minutes, 3 times a week can help build and strengthen bone  Do not smoke  Nicotine and other chemicals in cigarettes and cigars can cause lung damage  Ask your healthcare provider for information if you currently smoke and need help to quit  E-cigarettes or smokeless tobacco still contain nicotine  Talk to your healthcare provider before you use these products  Go to physical therapy as directed  A physical therapist teaches you exercises to help improve movement and muscle strength  Follow up with your doctor as directed:  Write down your questions so you remember to ask them during your visits  © Copyright [employer] 2022 Information is for End User's use only and may not be sold, redistributed or otherwise used for commercial purposes  All illustrations and images included in CareNotes® are the copyrighted property of [employer]  or [facility] Elvin Brower   The above information is an  only  It is not intended as medical advice for individual conditions or treatments  Talk to your doctor, nurse or pharmacist before following any medical regimen to see if it is safe and effective for you

## 2022-06-29 NOTE — TELEPHONE ENCOUNTER
Lisa the caregiver dropped off [employer] Authorization to release information form   Scanned in patients documents and media

## 2022-06-30 ENCOUNTER — CLINICAL SUPPORT (OUTPATIENT)
Dept: FAMILY MEDICINE CLINIC | Facility: CLINIC | Age: 62
End: 2022-06-30

## 2022-06-30 DIAGNOSIS — M81.0 OSTEOPOROSIS WITHOUT CURRENT PATHOLOGICAL FRACTURE, UNSPECIFIED OSTEOPOROSIS TYPE: Primary | ICD-10-CM

## 2022-06-30 PROCEDURE — 96372 THER/PROPH/DIAG INJ SC/IM: CPT

## 2022-07-13 ENCOUNTER — APPOINTMENT (OUTPATIENT)
Dept: LAB | Facility: HOSPITAL | Age: 62
End: 2022-07-13
Payer: MEDICARE

## 2022-07-13 DIAGNOSIS — M81.0 OSTEOPOROSIS, UNSPECIFIED OSTEOPOROSIS TYPE, UNSPECIFIED PATHOLOGICAL FRACTURE PRESENCE: ICD-10-CM

## 2022-07-13 LAB
ANION GAP SERPL CALCULATED.3IONS-SCNC: 6 MMOL/L (ref 4–13)
BUN SERPL-MCNC: 11 MG/DL (ref 5–25)
CALCIUM SERPL-MCNC: 9.7 MG/DL (ref 8.3–10.1)
CHLORIDE SERPL-SCNC: 103 MMOL/L (ref 100–108)
CO2 SERPL-SCNC: 31 MMOL/L (ref 21–32)
CREAT SERPL-MCNC: 0.92 MG/DL (ref 0.6–1.3)
GFR SERPL CREATININE-BSD FRML MDRD: 67 ML/MIN/1.73SQ M
GLUCOSE SERPL-MCNC: 129 MG/DL (ref 65–140)
POTASSIUM SERPL-SCNC: 3.9 MMOL/L (ref 3.5–5.3)
SODIUM SERPL-SCNC: 140 MMOL/L (ref 136–145)

## 2022-07-13 PROCEDURE — 80048 BASIC METABOLIC PNL TOTAL CA: CPT

## 2022-07-13 PROCEDURE — 36415 COLL VENOUS BLD VENIPUNCTURE: CPT

## 2022-07-25 ENCOUNTER — VBI (OUTPATIENT)
Dept: ADMINISTRATIVE | Facility: OTHER | Age: 62
End: 2022-07-25

## 2022-08-18 ENCOUNTER — TELEPHONE (OUTPATIENT)
Dept: FAMILY MEDICINE CLINIC | Facility: CLINIC | Age: 62
End: 2022-08-18

## 2022-08-26 ENCOUNTER — OFFICE VISIT (OUTPATIENT)
Dept: NEUROLOGY | Facility: CLINIC | Age: 62
End: 2022-08-26
Payer: MEDICARE

## 2022-08-26 VITALS — SYSTOLIC BLOOD PRESSURE: 130 MMHG | DIASTOLIC BLOOD PRESSURE: 76 MMHG

## 2022-08-26 DIAGNOSIS — G24.9 DYSTONIA: ICD-10-CM

## 2022-08-26 DIAGNOSIS — G20 PARKINSON'S DISEASE (HCC): ICD-10-CM

## 2022-08-26 DIAGNOSIS — G80.0 SPASTIC QUADRIPLEGIC CEREBRAL PALSY (HCC): Primary | ICD-10-CM

## 2022-08-26 PROCEDURE — 99214 OFFICE O/P EST MOD 30 MIN: CPT | Performed by: NURSE PRACTITIONER

## 2022-08-26 NOTE — PATIENT INSTRUCTIONS
Increase sinemet 25/100 mg to 1 5 tabs 3x/day to see if helps with dystonia  I will reach out to dr Bharti March about botox    Ok to use adela lift prn for transfer  Good shukla for wheel chair clinic evaluation

## 2022-08-26 NOTE — PROGRESS NOTES
Patient ID: Joselyn Whitlock is a 63 y o  female  Assessment/Plan:    Parkinsonism [facility])  Patient returns for follow-up for parkinsonism  Her abilify dose has been reduced since last visit, will likely remain on current dose 2 mg per caregiver  She continues to note improvement in tremor, increased alertness and responsiveness, more facial expression with continued reduction of Abilify  Staff tells me she has had oral dyskinesias for the last 14 years and are currently unchanged  She continues to have difficulty with ambulation-can only ambulate 5-10 feet while holding on to staff  No clear freezing  She continues with some spasticity as well as dystonic posturing  Her posturing appears to be less from my last visit with her  She is sensitive to medications so have been unable to increase her baclofen  Botox injections were discussed with PM&R however per caregiver today she received denial letter  Will reach out to PM&R regarding this and any other suggestions  They do have upcoming visit with them in the next few months  Staff requested referral for wheel chair clinic evaluation with Elsie Bella given patient's status as they would like to have a more appropriate wheel chair for her current issues  We did discuss possible adjustment of her sinemet today  Given improvement in some of her symptoms opted to remain on current dose of sinemet 25/100 mg 1 5 tabs TID  Plan for follow up in 4 months  To contact the office sooner with any concerns or worsening symptoms  Diagnoses and all orders for this visit:    Spastic quadriplegic cerebral palsy ([address] )    Parkinson's disease ([address] )  -     Ambulatory Referral to Physical Therapy; Future  -     carbidopa-levodopa (Sinemet)  mg per tablet; Take 1 5 tabs TID    Dystonia           Subjective:    HPI    Joselyn Whitlock is a 63 y o  female with a PMH of intellectual disability 2/2 cerebral palsy, diabetes, and dysphagia presenting for follow up  To review, she has followed with our epilepsy team since 2016 for history of seizure like episodes  When she was seen in 10/2021  her largest concern was increased tremors, and gait changes, possible "freezing" Her exam did show significant spasticity  Symptoms worsening over the past few months  She was started on a trial of sinemet due to worsening parkinsonain features, she was referred to PM&R due to spasticity  Did see PM&R for inital consult 4/2022 in which there was a question of dystonia vs spasticity, plan was for possible botox injections  Last office visit 4/2022 in which she had noted improvement in symptoms with start of sinemet and decrease in abilify by psych  Was to discuss further decrease with psych at upcoming appt  Current Medication:  abilify 2 mg QD  Sinemet 25/100 mg 1 tab TID  Baclofen 10 mg TID     Interval History:  She presents today with her caregiver from group home  She did see psych and reduced abilify to 2 mg-he does want her to remain on abilify  They have continued to note that she is more alert, she did try to  a fork and feed herself recently which she has not really done over the past year  Tremor continues to improve, sometimes when she is excited will look worse  Seems amplitude the better  She has been wearing hand braces at night due to the contractures of her hands  Did apply for botox and per staff this was denied  She does have upcoming appt with PM&R in October to further discuss  Staff is able to stand her but she continues to only ambulate a few feet  She continues with spasticity in her arms, hands, they have been trying to fix the posturing of her right foot  Her caregiver has not really been paying attention to medication timing and if she is having wearing off  Staff has been having difficulty moving her, she needs an order for adela lift for transfer prn     She needs a referral to [facility] for wheel chair evaluation-she has become less ambulatory and requiring assistance to transfer  She now requires adela lift at times due to her spasticity and dystonia, gait dysfunction  She is an aspiration risk and does require special positioning of her head to eat and would likely benefit from some type of head support/rest that would be recommended at her wheel chair evaluation  She did have a CT head performed 8/2021 with no acute changes, however did show Decreased attenuation is noted in periventricular and subcortical white matter demonstrating an appearance that is statistically most likely to represent mild microangiopathic change; this appearance is similar when compared to most recent   prior examination  Chronic lacunar infarction(s) are noted in basal ganglia, unchanged from prior exam       The following portions of the patient's history were reviewed and updated as appropriate: allergies, current medications, past family history, past medical history, past social history, past surgical history and problem list        Objective:    Blood pressure 130/76, last menstrual period 11/29/2009, not currently breastfeeding  Physical Exam  Constitutional:       General: She is awake  Eyes:      General: Lids are normal       Extraocular Movements: Extraocular movements intact  Pupils: Pupils are equal, round, and reactive to light  Neurological:      Mental Status: She is alert  Deep Tendon Reflexes: Reflexes are normal and symmetric  Neurological Exam  Mental Status  Awake and alert  Speech: nonverbal  Follows one-step commands  Cranial Nerves  CN III, IV, VI: Extraocular movements intact bilaterally  Normal lids and orbits bilaterally  Pupils equal round and reactive to light bilaterally  CN V: Facial sensation is normal   CN VII: Full and symmetric facial movement    CN VIII: Hearing is normal   CN XI: Shoulder shrug strength is normal   CN XII: Tongue midline without atrophy or fasciculations  Motor    Moves all extremities antigravity normal  strength b/l, intermittently follows commands so had difficulty assessing full strength but appears equal     Sensory  Light touch is normal in upper and lower extremities  Reflexes  Deep tendon reflexes are 2+ and symmetric in all four extremities  Coordination    Occasional resting tremor in left hand, no postural tremor,  Unable to perform CARLYLE but overall mild bradykinesia  She did have spasticity in the UE along with toritcollis  I did not visualize any abnormal posturing of the right wrist today as previous  Her left wrist was contracture with no change in positioning/posture  She did have involuntary eversion and posturing of b/l feet that appears dystonic, stiral posturing of the toes in b/l feet intermittently       Gait    Patient presented in wheel chair, not ambulated due to safety concerns         I have personally reviewed the ROS performed by the MA     ROS:    Review of Systems   Constitutional: Negative  Negative for appetite change and fever  HENT: Positive for trouble swallowing (Honey thick liquid but is ongoing)  Negative for hearing loss, tinnitus and voice change  Eyes: Negative  Negative for photophobia and pain  Respiratory: Negative  Negative for shortness of breath  Cardiovascular: Negative  Negative for palpitations  Gastrointestinal: Negative  Negative for nausea and vomiting  Endocrine: Negative  Negative for cold intolerance  Genitourinary: Negative  Negative for dysuria, frequency and urgency  Musculoskeletal: Positive for gait problem and neck stiffness  Negative for myalgias and neck pain  Balance Issues  Cannot ambulate  Stiffness in legs     Skin: Negative  Negative for rash  Allergic/Immunologic: Negative  Neurological: Positive for tremors (Hands and arms), speech difficulty (Very rare that she is verbal) and weakness   Negative for dizziness, seizures, syncope, facial asymmetry, light-headedness, numbness and headaches  Hematological: Bruises/bleeds easily (Bruise)  Psychiatric/Behavioral: Negative  Negative for confusion, hallucinations and sleep disturbance  All other systems reviewed and are negative

## 2022-08-30 NOTE — ASSESSMENT & PLAN NOTE
Patient returns for follow-up for parkinsonism  Her abilify dose has been reduced since last visit, will likely remain on current dose 2 mg per caregiver  She continues to note improvement in tremor, increased alertness and responsiveness, more facial expression with continued reduction of Abilify  Staff tells me she has had oral dyskinesias for the last 14 years and are currently unchanged  She continues to have difficulty with ambulation-can only ambulate 5-10 feet while holding on to staff  No clear freezing  She continues with some spasticity as well as dystonic posturing  Her posturing appears to be less from my last visit with her  She is sensitive to medications so have been unable to increase her baclofen  Botox injections were discussed with PM&R however per caregiver today she received denial letter  Will reach out to PM&R regarding this and any other suggestions  They do have upcoming visit with them in the next few months  Staff requested referral for wheel chair clinic evaluation with Kael Jim given patient's status as they would like to have a more appropriate wheel chair for her current issues  We did discuss possible adjustment of her sinemet today  Given improvement in some of her symptoms opted to remain on current dose of sinemet 25/100 mg 1 5 tabs TID  Plan for follow up in 4 months  To contact the office sooner with any concerns or worsening symptoms

## 2022-09-02 ENCOUNTER — TELEPHONE (OUTPATIENT)
Dept: FAMILY MEDICINE CLINIC | Facility: CLINIC | Age: 62
End: 2022-09-02

## 2022-09-02 NOTE — TELEPHONE ENCOUNTER
Folder (To be completed by) -Dr Wilber Zacarias      Name of Form - Detailed written order     Color folder [facility])    Form to be Faxed (Fax #):[phone]    Patient was made aware of the 7 business day form policy

## 2022-09-08 ENCOUNTER — HOSPITAL ENCOUNTER (OUTPATIENT)
Dept: RADIOLOGY | Age: 62
Discharge: HOME | End: 2022-09-08
Payer: MEDICARE

## 2022-09-08 DIAGNOSIS — M81.0 OSTEOPOROSIS, UNSPECIFIED OSTEOPOROSIS TYPE, UNSPECIFIED PATHOLOGICAL FRACTURE PRESENCE: ICD-10-CM

## 2022-09-08 PROCEDURE — 77080 DXA BONE DENSITY AXIAL: CPT

## 2022-09-12 NOTE — TELEPHONE ENCOUNTER
Scanned to Chart    Called spoke to Brittney Calles,  made aware form was completed/ready for Pick-Up in bin

## 2022-09-14 ENCOUNTER — APPOINTMENT (OUTPATIENT)
Dept: LAB | Facility: HOSPITAL | Age: 62
End: 2022-09-14
Payer: MEDICARE

## 2022-09-14 DIAGNOSIS — E78.5 HYPERLIPIDEMIA, UNSPECIFIED HYPERLIPIDEMIA TYPE: ICD-10-CM

## 2022-09-14 DIAGNOSIS — M81.0 OSTEOPOROSIS, UNSPECIFIED OSTEOPOROSIS TYPE, UNSPECIFIED PATHOLOGICAL FRACTURE PRESENCE: ICD-10-CM

## 2022-09-14 DIAGNOSIS — F31.9 BIPOLAR AFFECTIVE DISORDER, REMISSION STATUS UNSPECIFIED (HCC): ICD-10-CM

## 2022-09-14 DIAGNOSIS — E11.69 TYPE 2 DIABETES MELLITUS WITH OTHER SPECIFIED COMPLICATION, WITHOUT LONG-TERM CURRENT USE OF INSULIN (HCC): ICD-10-CM

## 2022-09-14 LAB
25(OH)D3 SERPL-MCNC: 73.8 NG/ML (ref 30–100)
ALBUMIN SERPL BCP-MCNC: 2.9 G/DL (ref 3.5–5)
ALP SERPL-CCNC: 46 U/L (ref 46–116)
ALT SERPL W P-5'-P-CCNC: 12 U/L (ref 12–78)
ANION GAP SERPL CALCULATED.3IONS-SCNC: 3 MMOL/L (ref 4–13)
AST SERPL W P-5'-P-CCNC: 7 U/L (ref 5–45)
BASOPHILS # BLD AUTO: 0.04 THOUSANDS/ΜL (ref 0–0.1)
BASOPHILS NFR BLD AUTO: 1 % (ref 0–1)
BILIRUB SERPL-MCNC: 0.34 MG/DL (ref 0.2–1)
BILIRUB UR QL STRIP: NEGATIVE
BUN SERPL-MCNC: 12 MG/DL (ref 5–25)
CALCIUM ALBUM COR SERPL-MCNC: 10.5 MG/DL (ref 8.3–10.1)
CALCIUM SERPL-MCNC: 9.6 MG/DL (ref 8.3–10.1)
CHLORIDE SERPL-SCNC: 105 MMOL/L (ref 96–108)
CHOLEST SERPL-MCNC: 162 MG/DL
CK SERPL-CCNC: 33 U/L (ref 26–192)
CLARITY UR: CLEAR
CO2 SERPL-SCNC: 33 MMOL/L (ref 21–32)
COLOR UR: COLORLESS
CREAT SERPL-MCNC: 0.85 MG/DL (ref 0.6–1.3)
CREAT UR-MCNC: 25.8 MG/DL
EOSINOPHIL # BLD AUTO: 0.31 THOUSAND/ΜL (ref 0–0.61)
EOSINOPHIL NFR BLD AUTO: 5 % (ref 0–6)
ERYTHROCYTE [DISTWIDTH] IN BLOOD BY AUTOMATED COUNT: 12.4 % (ref 11.6–15.1)
GFR SERPL CREATININE-BSD FRML MDRD: 74 ML/MIN/1.73SQ M
GLUCOSE SERPL-MCNC: 91 MG/DL (ref 65–140)
GLUCOSE UR STRIP-MCNC: NEGATIVE MG/DL
HCT VFR BLD AUTO: 38.3 % (ref 34.8–46.1)
HDLC SERPL-MCNC: 46 MG/DL
HGB BLD-MCNC: 12.2 G/DL (ref 11.5–15.4)
HGB UR QL STRIP.AUTO: NEGATIVE
IMM GRANULOCYTES # BLD AUTO: 0.03 THOUSAND/UL (ref 0–0.2)
IMM GRANULOCYTES NFR BLD AUTO: 1 % (ref 0–2)
KETONES UR STRIP-MCNC: NEGATIVE MG/DL
LDLC SERPL CALC-MCNC: 76 MG/DL (ref 0–100)
LEUKOCYTE ESTERASE UR QL STRIP: NEGATIVE
LYMPHOCYTES # BLD AUTO: 2.42 THOUSANDS/ΜL (ref 0.6–4.47)
LYMPHOCYTES NFR BLD AUTO: 40 % (ref 14–44)
MCH RBC QN AUTO: 33.1 PG (ref 26.8–34.3)
MCHC RBC AUTO-ENTMCNC: 31.9 G/DL (ref 31.4–37.4)
MCV RBC AUTO: 104 FL (ref 82–98)
MICROALBUMIN UR-MCNC: <5 MG/L (ref 0–20)
MICROALBUMIN/CREAT 24H UR: <19 MG/G CREATININE (ref 0–30)
MONOCYTES # BLD AUTO: 0.51 THOUSAND/ΜL (ref 0.17–1.22)
MONOCYTES NFR BLD AUTO: 8 % (ref 4–12)
NEUTROPHILS # BLD AUTO: 2.73 THOUSANDS/ΜL (ref 1.85–7.62)
NEUTS SEG NFR BLD AUTO: 45 % (ref 43–75)
NITRITE UR QL STRIP: NEGATIVE
NONHDLC SERPL-MCNC: 116 MG/DL
NRBC BLD AUTO-RTO: 0 /100 WBCS
PH UR STRIP.AUTO: 5.5 [PH]
PLATELET # BLD AUTO: 174 THOUSANDS/UL (ref 149–390)
PMV BLD AUTO: 12.2 FL (ref 8.9–12.7)
POTASSIUM SERPL-SCNC: 4.2 MMOL/L (ref 3.5–5.3)
PROT SERPL-MCNC: 7 G/DL (ref 6.4–8.4)
PROT UR STRIP-MCNC: NEGATIVE MG/DL
RBC # BLD AUTO: 3.69 MILLION/UL (ref 3.81–5.12)
SODIUM SERPL-SCNC: 141 MMOL/L (ref 135–147)
SP GR UR STRIP.AUTO: 1.01 (ref 1–1.03)
TRIGL SERPL-MCNC: 198 MG/DL
TSH SERPL DL<=0.05 MIU/L-ACNC: 2.68 UIU/ML (ref 0.45–4.5)
UROBILINOGEN UR STRIP-ACNC: <2 MG/DL
VALPROATE SERPL-MCNC: 45 UG/ML (ref 50–100)
WBC # BLD AUTO: 6.04 THOUSAND/UL (ref 4.31–10.16)

## 2022-09-14 PROCEDURE — 80164 ASSAY DIPROPYLACETIC ACD TOT: CPT

## 2022-09-14 PROCEDURE — 36415 COLL VENOUS BLD VENIPUNCTURE: CPT

## 2022-09-14 PROCEDURE — 83036 HEMOGLOBIN GLYCOSYLATED A1C: CPT

## 2022-09-14 PROCEDURE — 80053 COMPREHEN METABOLIC PANEL: CPT

## 2022-09-14 PROCEDURE — 82043 UR ALBUMIN QUANTITATIVE: CPT

## 2022-09-14 PROCEDURE — 82570 ASSAY OF URINE CREATININE: CPT

## 2022-09-14 PROCEDURE — 82306 VITAMIN D 25 HYDROXY: CPT

## 2022-09-14 PROCEDURE — 84443 ASSAY THYROID STIM HORMONE: CPT

## 2022-09-14 PROCEDURE — 82550 ASSAY OF CK (CPK): CPT

## 2022-09-14 PROCEDURE — 80061 LIPID PANEL: CPT

## 2022-09-14 PROCEDURE — 85025 COMPLETE CBC W/AUTO DIFF WBC: CPT

## 2022-09-15 LAB
BACTERIA UR CULT: NORMAL
EST. AVERAGE GLUCOSE BLD GHB EST-MCNC: 148 MG/DL
HBA1C MFR BLD: 6.8 %

## 2022-09-30 ENCOUNTER — OFFICE VISIT (OUTPATIENT)
Dept: FAMILY MEDICINE CLINIC | Facility: CLINIC | Age: 62
End: 2022-09-30

## 2022-09-30 VITALS
HEART RATE: 96 BPM | HEIGHT: 58 IN | TEMPERATURE: 97.2 F | SYSTOLIC BLOOD PRESSURE: 116 MMHG | DIASTOLIC BLOOD PRESSURE: 82 MMHG | RESPIRATION RATE: 16 BRPM | WEIGHT: 110 LBS | BODY MASS INDEX: 23.09 KG/M2

## 2022-09-30 DIAGNOSIS — J30.2 SEASONAL ALLERGIES: ICD-10-CM

## 2022-09-30 DIAGNOSIS — M81.0 OSTEOPOROSIS, UNSPECIFIED OSTEOPOROSIS TYPE, UNSPECIFIED PATHOLOGICAL FRACTURE PRESENCE: ICD-10-CM

## 2022-09-30 DIAGNOSIS — Z23 ENCOUNTER FOR IMMUNIZATION: ICD-10-CM

## 2022-09-30 DIAGNOSIS — M16.0 OSTEOARTHRITIS OF BOTH HIPS, UNSPECIFIED OSTEOARTHRITIS TYPE: ICD-10-CM

## 2022-09-30 DIAGNOSIS — Z00.00 MEDICARE ANNUAL WELLNESS VISIT, SUBSEQUENT: Primary | ICD-10-CM

## 2022-09-30 PROCEDURE — G0439 PPPS, SUBSEQ VISIT: HCPCS | Performed by: FAMILY MEDICINE

## 2022-09-30 RX ORDER — CITALOPRAM 40 MG/1
1 TABLET ORAL DAILY
COMMUNITY
Start: 2022-09-28

## 2022-09-30 RX ORDER — ACETAMINOPHEN 160 MG/5ML
320 SUSPENSION ORAL EVERY 4 HOURS PRN
Qty: 236 ML | Refills: 3 | Status: SHIPPED | OUTPATIENT
Start: 2022-09-30 | End: 2022-10-07

## 2022-09-30 RX ORDER — ACETAMINOPHEN 160 MG/5ML
320 SUSPENSION ORAL EVERY 4 HOURS PRN
Qty: 236 ML | Refills: 3 | Status: SHIPPED | OUTPATIENT
Start: 2022-09-30 | End: 2022-09-30 | Stop reason: SDUPTHER

## 2022-09-30 NOTE — PATIENT INSTRUCTIONS
Medicare Preventive Visit Patient Instructions  Thank you for completing your Welcome to Medicare Visit or Medicare Annual Wellness Visit today  Your next wellness visit will be due in one year (10/1/2023)  The screening/preventive services that you may require over the next 5-10 years are detailed below  Some tests may not apply to you based off risk factors and/or age  Screening tests ordered at today's visit but not completed yet may show as past due  Also, please note that scanned in results may not display below  Preventive Screenings:  Service Recommendations Previous Testing/Comments   Colorectal Cancer Screening  * Colonoscopy    * Fecal Occult Blood Test (FOBT)/Fecal Immunochemical Test (FIT)  * Fecal DNA/Cologuard Test  * Flexible Sigmoidoscopy Age: 39-70 years old   Colonoscopy: every 10 years (may be performed more frequently if at higher risk)  OR  FOBT/FIT: every 1 year  OR  Cologuard: every 3 years  OR  Sigmoidoscopy: every 5 years  Screening may be recommended earlier than age 39 if at higher risk for colorectal cancer  Also, an individualized decision between you and your healthcare provider will decide whether screening between the ages of 74-80 would be appropriate  Colonoscopy: 03/10/2022  FOBT/FIT: 09/08/2020  Cologuard: Not on file  Sigmoidoscopy: Not on file    Screening Current     Breast Cancer Screening Age: 36 years old  Frequency: every 1-2 years  Not required if history of left and right mastectomy Mammogram: 12/16/2021    Screening Current   Cervical Cancer Screening Between the ages of 21-29, pap smear recommended once every 3 years  Between the ages of 33-67, can perform pap smear with HPV co-testing every 5 years     Recommendations may differ for women with a history of total hysterectomy, cervical cancer, or abnormal pap smears in past  Pap Smear: 08/10/2021    Screening Current   Hepatitis C Screening Once for adults born between 1945 and 1965  More frequently in patients at high risk for Hepatitis C Hep C Antibody: 07/13/2021    Screening Current   Diabetes Screening 1-2 times per year if you're at risk for diabetes or have pre-diabetes Fasting glucose: 147 mg/dL (3/10/2022)  A1C: 6 8 % (9/14/2022)  Screening Not Indicated  History Diabetes   Cholesterol Screening Once every 5 years if you don't have a lipid disorder  May order more often based on risk factors  Lipid panel: 09/14/2022    Screening Not Indicated  History Lipid Disorder     Other Preventive Screenings Covered by Medicare:  1  Abdominal Aortic Aneurysm (AAA) Screening: covered once if your at risk  You're considered to be at risk if you have a family history of AAA  2  Lung Cancer Screening: covers low dose CT scan once per year if you meet all of the following conditions: (1) Age 50-69; (2) No signs or symptoms of lung cancer; (3) Current smoker or have quit smoking within the last 15 years; (4) You have a tobacco smoking history of at least 20 pack years (packs per day multiplied by number of years you smoked); (5) You get a written order from a healthcare provider  3  Glaucoma Screening: covered annually if you're considered high risk: (1) You have diabetes OR (2) Family history of glaucoma OR (3)  aged 48 and older OR (3)  American aged 72 and older  3  Osteoporosis Screening: covered every 2 years if you meet one of the following conditions: (1) You're estrogen deficient and at risk for osteoporosis based off medical history and other findings; (2) Have a vertebral abnormality; (3) On glucocorticoid therapy for more than 3 months; (4) Have primary hyperparathyroidism; (5) On osteoporosis medications and need to assess response to drug therapy  · Last bone density test (DXA Scan): 09/08/2022   5  HIV Screening: covered annually if you're between the age of 15-65  Also covered annually if you are younger than 13 and older than 72 with risk factors for HIV infection   For pregnant patients, it is covered up to 3 times per pregnancy  Immunizations:  Immunization Recommendations   Influenza Vaccine Annual influenza vaccination during flu season is recommended for all persons aged >= 6 months who do not have contraindications   Pneumococcal Vaccine   * Pneumococcal conjugate vaccine = PCV13 (Prevnar 13), PCV15 (Vaxneuvance), PCV20 (Prevnar 20)  * Pneumococcal polysaccharide vaccine = PPSV23 (Pneumovax) Adults 25-60 years old: 1-3 doses may be recommended based on certain risk factors  Adults 72 years old: 1-2 doses may be recommended based off what pneumonia vaccine you previously received   Hepatitis B Vaccine 3 dose series if at intermediate or high risk (ex: diabetes, end stage renal disease, liver disease)   Tetanus (Td) Vaccine - COST NOT COVERED BY MEDICARE PART B Following completion of primary series, a booster dose should be given every 10 years to maintain immunity against tetanus  Td may also be given as tetanus wound prophylaxis  Tdap Vaccine - COST NOT COVERED BY MEDICARE PART B Recommended at least once for all adults  For pregnant patients, recommended with each pregnancy  Shingles Vaccine (Shingrix) - COST NOT COVERED BY MEDICARE PART B  2 shot series recommended in those aged 48 and above     Health Maintenance Due:      Topic Date Due    Breast Cancer Screening: Mammogram  12/16/2023    Cervical Cancer Screening  08/10/2024    Colorectal Cancer Screening  03/09/2027    HIV Screening  Completed    Hepatitis C Screening  Completed     Immunizations Due:      Topic Date Due    Pneumococcal Vaccine: Pediatrics (0 to 5 Years) and At-Risk Patients (6 to 59 Years) (2 - PCV) 01/23/2019    Influenza Vaccine (1) 09/01/2022     Advance Directives   What are advance directives? Advance directives are legal documents that state your wishes and plans for medical care  These plans are made ahead of time in case you lose your ability to make decisions for yourself   Advance directives can apply to any medical decision, such as the treatments you want, and if you want to donate organs  What are the types of advance directives? There are many types of advance directives, and each state has rules about how to use them  You may choose a combination of any of the following:  · Living will: This is a written record of the treatment you want  You can also choose which treatments you do not want, which to limit, and which to stop at a certain time  This includes surgery, medicine, IV fluid, and tube feedings  · Durable power of  for healthcare [facility]): This is a written record that states who you want to make healthcare choices for you when you are unable to make them for yourself  This person, called a proxy, is usually a family member or a friend  You may choose more than 1 proxy  · Do not resuscitate (DNR) order:  A DNR order is used in case your heart stops beating or you stop breathing  It is a request not to have certain forms of treatment, such as CPR  A DNR order may be included in other types of advance directives  · Medical directive: This covers the care that you want if you are in a coma, near death, or unable to make decisions for yourself  You can list the treatments you want for each condition  Treatment may include pain medicine, surgery, blood transfusions, dialysis, IV or tube feedings, and a ventilator (breathing machine)  · Values history: This document has questions about your views, beliefs, and how you feel and think about life  This information can help others choose the care that you would choose  Why are advance directives important? An advance directive helps you control your care  Although spoken wishes may be used, it is better to have your wishes written down  Spoken wishes can be misunderstood, or not followed  Treatments may be given even if you do not want them   An advance directive may make it easier for your family to make difficult choices about your care    Urinary Incontinence   Urinary incontinence (UI)  is when you lose control of your bladder  UI develops because your bladder cannot store or empty urine properly  The 3 most common types of UI are stress incontinence, urge incontinence, or both  Medicines:   · May be given to help strengthen your bladder control  Report any side effects of medication to your healthcare provider  Do pelvic muscle exercises often:  Your pelvic muscles help you stop urinating  Squeeze these muscles tight for 5 seconds, then relax for 5 seconds  Gradually work up to squeezing for 10 seconds  Do 3 sets of 15 repetitions a day, or as directed  This will help strengthen your pelvic muscles and improve bladder control  Train your bladder:  Go to the bathroom at set times, such as every 2 hours, even if you do not feel the urge to go  You can also try to hold your urine when you feel the urge to go  For example, hold your urine for 5 minutes when you feel the urge to go  As that becomes easier, hold your urine for 10 minutes  Self-care:   · Keep a UI record  Write down how often you leak urine and how much you leak  Make a note of what you were doing when you leaked urine  · Drink liquids as directed  You may need to limit the amount of liquid you drink to help control your urine leakage  Do not drink any liquid right before you go to bed  Limit or do not have drinks that contain caffeine or alcohol  · Prevent constipation  Eat a variety of high-fiber foods  Good examples are high-fiber cereals, beans, vegetables, and whole-grain breads  Walking is the best way to trigger your intestines to have a bowel movement  · Exercise regularly and maintain a healthy weight  Weight loss and exercise will decrease pressure on your bladder and help you control your leakage  · Use a catheter as directed  to help empty your bladder  A catheter is a tiny, plastic tube that is put into your bladder to drain your urine     · Go to behavior therapy as directed  Behavior therapy may be used to help you learn to control your urge to urinate  © Copyright [employer] 2018 Information is for End User's use only and may not be sold, redistributed or otherwise used for commercial purposes  All illustrations and images included in CareNotes® are the copyrighted property of [employer]  or [facility] Preventive Visit Patient Instructions  Thank you for completing your Welcome to Medicare Visit or Medicare Annual Wellness Visit today  Your next wellness visit will be due in one year (10/1/2023)  The screening/preventive services that you may require over the next 5-10 years are detailed below  Some tests may not apply to you based off risk factors and/or age  Screening tests ordered at today's visit but not completed yet may show as past due  Also, please note that scanned in results may not display below  Preventive Screenings:  Service Recommendations Previous Testing/Comments   Colorectal Cancer Screening  * Colonoscopy    * Fecal Occult Blood Test (FOBT)/Fecal Immunochemical Test (FIT)  * Fecal DNA/Cologuard Test  * Flexible Sigmoidoscopy Age: 39-70 years old   Colonoscopy: every 10 years (may be performed more frequently if at higher risk)  OR  FOBT/FIT: every 1 year  OR  Cologuard: every 3 years  OR  Sigmoidoscopy: every 5 years  Screening may be recommended earlier than age 39 if at higher risk for colorectal cancer  Also, an individualized decision between you and your healthcare provider will decide whether screening between the ages of 74-80 would be appropriate   Colonoscopy: 03/10/2022  FOBT/FIT: 09/08/2020  Cologuard: Not on file  Sigmoidoscopy: Not on file    Screening Current     Breast Cancer Screening Age: 36 years old  Frequency: every 1-2 years  Not required if history of left and right mastectomy Mammogram: 12/16/2021    Screening Current   Cervical Cancer Screening Between the ages of 21-29, pap smear recommended once every 3 years  Between the ages of 33-67, can perform pap smear with HPV co-testing every 5 years  Recommendations may differ for women with a history of total hysterectomy, cervical cancer, or abnormal pap smears in past  Pap Smear: 08/10/2021    Screening Current   Hepatitis C Screening Once for adults born between 1945 and 1965  More frequently in patients at high risk for Hepatitis C Hep C Antibody: 07/13/2021    Screening Current   Diabetes Screening 1-2 times per year if you're at risk for diabetes or have pre-diabetes Fasting glucose: 147 mg/dL (3/10/2022)  A1C: 6 8 % (9/14/2022)  Screening Not Indicated  History Diabetes   Cholesterol Screening Once every 5 years if you don't have a lipid disorder  May order more often based on risk factors  Lipid panel: 09/14/2022    Screening Not Indicated  History Lipid Disorder     Other Preventive Screenings Covered by Medicare:  6  Abdominal Aortic Aneurysm (AAA) Screening: covered once if your at risk  You're considered to be at risk if you have a family history of AAA  7  Lung Cancer Screening: covers low dose CT scan once per year if you meet all of the following conditions: (1) Age 50-69; (2) No signs or symptoms of lung cancer; (3) Current smoker or have quit smoking within the last 15 years; (4) You have a tobacco smoking history of at least 20 pack years (packs per day multiplied by number of years you smoked); (5) You get a written order from a healthcare provider  8  Glaucoma Screening: covered annually if you're considered high risk: (1) You have diabetes OR (2) Family history of glaucoma OR (3)  aged 48 and older OR (3)  American aged 72 and older  5   Osteoporosis Screening: covered every 2 years if you meet one of the following conditions: (1) You're estrogen deficient and at risk for osteoporosis based off medical history and other findings; (2) Have a vertebral abnormality; (3) On glucocorticoid therapy for more than 3 months; (4) Have primary hyperparathyroidism; (5) On osteoporosis medications and need to assess response to drug therapy  · Last bone density test (DXA Scan): 09/08/2022   10  HIV Screening: covered annually if you're between the age of 15-65  Also covered annually if you are younger than 13 and older than 72 with risk factors for HIV infection  For pregnant patients, it is covered up to 3 times per pregnancy  Immunizations:  Immunization Recommendations   Influenza Vaccine Annual influenza vaccination during flu season is recommended for all persons aged >= 6 months who do not have contraindications   Pneumococcal Vaccine   * Pneumococcal conjugate vaccine = PCV13 (Prevnar 13), PCV15 (Vaxneuvance), PCV20 (Prevnar 20)  * Pneumococcal polysaccharide vaccine = PPSV23 (Pneumovax) Adults 25-60 years old: 1-3 doses may be recommended based on certain risk factors  Adults 72 years old: 1-2 doses may be recommended based off what pneumonia vaccine you previously received   Hepatitis B Vaccine 3 dose series if at intermediate or high risk (ex: diabetes, end stage renal disease, liver disease)   Tetanus (Td) Vaccine - COST NOT COVERED BY MEDICARE PART B Following completion of primary series, a booster dose should be given every 10 years to maintain immunity against tetanus  Td may also be given as tetanus wound prophylaxis  Tdap Vaccine - COST NOT COVERED BY MEDICARE PART B Recommended at least once for all adults  For pregnant patients, recommended with each pregnancy     Shingles Vaccine (Shingrix) - COST NOT COVERED BY MEDICARE PART B  2 shot series recommended in those aged 48 and above     Health Maintenance Due:      Topic Date Due    Breast Cancer Screening: Mammogram  12/16/2023    Cervical Cancer Screening  08/10/2024    Colorectal Cancer Screening  03/09/2027    HIV Screening  Completed    Hepatitis C Screening  Completed     Immunizations Due:      Topic Date Due    Pneumococcal Vaccine: Pediatrics (0 to 5 Years) and At-Risk Patients (6 to 59 Years) (2 - PCV) 01/23/2019    Influenza Vaccine (1) 09/01/2022     Advance Directives   What are advance directives? Advance directives are legal documents that state your wishes and plans for medical care  These plans are made ahead of time in case you lose your ability to make decisions for yourself  Advance directives can apply to any medical decision, such as the treatments you want, and if you want to donate organs  What are the types of advance directives? There are many types of advance directives, and each state has rules about how to use them  You may choose a combination of any of the following:  · Living will: This is a written record of the treatment you want  You can also choose which treatments you do not want, which to limit, and which to stop at a certain time  This includes surgery, medicine, IV fluid, and tube feedings  · Durable power of  for healthcare [facility]): This is a written record that states who you want to make healthcare choices for you when you are unable to make them for yourself  This person, called a proxy, is usually a family member or a friend  You may choose more than 1 proxy  · Do not resuscitate (DNR) order:  A DNR order is used in case your heart stops beating or you stop breathing  It is a request not to have certain forms of treatment, such as CPR  A DNR order may be included in other types of advance directives  · Medical directive: This covers the care that you want if you are in a coma, near death, or unable to make decisions for yourself  You can list the treatments you want for each condition  Treatment may include pain medicine, surgery, blood transfusions, dialysis, IV or tube feedings, and a ventilator (breathing machine)  · Values history: This document has questions about your views, beliefs, and how you feel and think about life   This information can help others choose the care that you would choose  Why are advance directives important? An advance directive helps you control your care  Although spoken wishes may be used, it is better to have your wishes written down  Spoken wishes can be misunderstood, or not followed  Treatments may be given even if you do not want them  An advance directive may make it easier for your family to make difficult choices about your care  Urinary Incontinence   Urinary incontinence (UI)  is when you lose control of your bladder  UI develops because your bladder cannot store or empty urine properly  The 3 most common types of UI are stress incontinence, urge incontinence, or both  Medicines:   · May be given to help strengthen your bladder control  Report any side effects of medication to your healthcare provider  Do pelvic muscle exercises often:  Your pelvic muscles help you stop urinating  Squeeze these muscles tight for 5 seconds, then relax for 5 seconds  Gradually work up to squeezing for 10 seconds  Do 3 sets of 15 repetitions a day, or as directed  This will help strengthen your pelvic muscles and improve bladder control  Train your bladder:  Go to the bathroom at set times, such as every 2 hours, even if you do not feel the urge to go  You can also try to hold your urine when you feel the urge to go  For example, hold your urine for 5 minutes when you feel the urge to go  As that becomes easier, hold your urine for 10 minutes  Self-care:   · Keep a UI record  Write down how often you leak urine and how much you leak  Make a note of what you were doing when you leaked urine  · Drink liquids as directed  You may need to limit the amount of liquid you drink to help control your urine leakage  Do not drink any liquid right before you go to bed  Limit or do not have drinks that contain caffeine or alcohol  · Prevent constipation  Eat a variety of high-fiber foods   Good examples are high-fiber cereals, beans, vegetables, and whole-grain breads  Walking is the best way to trigger your intestines to have a bowel movement  · Exercise regularly and maintain a healthy weight  Weight loss and exercise will decrease pressure on your bladder and help you control your leakage  · Use a catheter as directed  to help empty your bladder  A catheter is a tiny, plastic tube that is put into your bladder to drain your urine  · Go to behavior therapy as directed  Behavior therapy may be used to help you learn to control your urge to urinate  © Copyright [employer] 2018 Information is for End User's use only and may not be sold, redistributed or otherwise used for commercial purposes   All illustrations and images included in CareNotes® are the copyrighted property of [employer]  or [address] [place] [address]

## 2022-09-30 NOTE — PROGRESS NOTES
Assessment and Plan:     Problem List Items Addressed This Visit        Musculoskeletal and Integument    Osteoporosis    Relevant Orders    Comprehensive metabolic panel    Comprehensive metabolic panel    Osteoarthritis of both hips     Switched Tylenol 325mg PRN from pill to liquid - caregiver will mix with foods and add thickener given hx of dysphagia         Relevant Medications    acetaminophen (TYLENOL) 160 mg/5 mL liquid       Other    Seasonal allergies     Switched Allegra from PO to liquid given hx of dysphagia  Caregiver will add to food with thickener         Relevant Medications    fexofenadine (ALLEGRA) 30 MG/5ML suspension    Medicare annual wellness visit, subsequent - Primary     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, parkinsonian sx, bipolar (on valproate), spasticity/torticollis, osteoporosis  PHQ-2 score: negative score of 0  Screenings:   - CV screening - up to date, labs reviewed   - Colonoscopy - last done 2021, due in 2026 due to incomplete bowel prep  - Mammogram - last done 2021, mammogram this year scheduled  - Pap smear - last done 2021, negative cotest, next due 2026  - Osteoporosis - recent DEXA shows improvement with osteopenia  Next due in 2 years  Vaccines - getting flu vaccine later in October, with new COVID booster afterwards  Also interested in getting Shingles vaccine - must receive at pharmacy given Medicare insurance  Ordered CMP as patient is due for Prolia injection in December  Follow-up in 1 year   Annual gyn exam scheduled for next week             Other Visit Diagnoses     Encounter for immunization               Preventive health issues were discussed with patient, and age appropriate screening tests were ordered as noted in patient's After Visit Summary    Personalized health advice and appropriate referrals for health education or preventive services given if needed, as noted in patient's After Visit Summary  History of Present Illness:     Patient presents for a Medicare Wellness Visit  No complaints, Labwork reviewed, discussed vaccine schedules - interested in getting Shingrex, flu vaccine and COVID booster in these next few upcoming months  Patient will get flu vaccine first, wait at least 2 weeks for COVID booster  Then will get Shingrex vaccine later on  Patient recently saw their neurologist and their Sinemet dose was increased and they are currently looking into getting botox  The caregiver Mark Canas is concerned as she noticed after the Sinemet dose increase, the patient seems to be more stiff; however, will continue to monitor as this change is very recent  Requesting CMP lab slip for Prolia injection in December HPI   Patient Care Team:  Coreen Avalos MD as PCP - General (Family Medicine)  MD Gian Pruett MD Kelsey Speaker, MD as Endoscopist     Review of Systems:     Review of Systems   Unable to perform ROS: Patient nonverbal        Problem List:     Patient Active Problem List   Diagnosis    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[address] )    Hyperlipidemia    Gastroesophageal reflux disease    Cerebral palsy ([address] )    Spasticity    Ambulatory dysfunction    Recurrent UTI (urinary tract infection)    Bipolar disorder ([address] )    Severe Intellectual disability    Type 2 diabetes mellitus, without long-term current use of insulin (HCC)    Abnormal albumin    Iron deficiency anemia    Toxic metabolic encephalopathy    Anxiety    Atopic dermatitis    Chondrodermatitis nodularis helicis of left ear    Constipation    Dry eye    Gait abnormality    Hypotension    Hypothyroidism    Menopause    Osteoporosis    Other chronic pain    Peripheral neuropathy    Physical deconditioning    Resting tremor    Seasonal allergies    Urinary incontinence    Osteoarthritis of both hips    Gait disturbance    Severe sepsis (HCC)    Hypernatremia    Breast asymmetry    Abdominal bloating    History of fall    Screening for colon cancer    History of vitamin D deficiency    Acute cystitis without hematuria    Decreased appetite    Dysphagia    Underweight    Eosinophilic leukocytosis    Macrocytic anemia    Exposure to COVID-19 virus    Abnormal finding on lung imaging    Herpes zoster without complication    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HCC)    Polyneuropathy associated with underlying disease ([facility]    Parkinsonism [facility]    Dystonia    Cervical dystonia    Medicare annual wellness visit, subsequent      Past Medical and Surgical History:     Past Medical History:   Diagnosis Date    Adjustment disorder     Altered mental status 12/11/2015    Anemia     Bipolar 1 disorder (HCC)     Cerebral palsy (HCC)     Chronic hypernatremia 2/6/2016    Closed fracture of left hip ([facility] ) 1/19/2016    Closed left hip fracture (HCC)     no surgery    Constipation     Dehydration 2/20/2016    Diabetes mellitus ([facility]     Disease of thyroid gland     Diverticulosis     Fracture of multiple ribs of right side     Hip fracture ([facility] ) 07/26/2015    left    Hyperlipidemia     Hypernatremia 12/28/2018    Hypotension     Impulse control disorder     Incontinence     Intellectual disability due to developmental disorder, unspecified     Microalbuminuria     Osteopathia     Osteoporosis     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[facility]     Thrombocytopenia ([facility] ) 7/31/2015     Past Surgical History:   Procedure Laterality Date    ABDOMINAL SURGERY      COLECTOMY MIN      re-anastomosis 7/22/16    COLONOSCOPY N/A 7/21/2016    Procedure: COLONOSCOPY;  Surgeon: Velia Gonzalez MD;  [facility];   Service:     COLONOSCOPY N/A 1/31/2016    Procedure: COLONOSCOPY;  Surgeon: Velia Gonzalez MD;  [facility];  Service:     COLONOSCOPY N/A 7/25/2017    Procedure: COLONOSCOPY; Surgeon: Vivian Tavarez MD;  [facility]; Service: Colorectal    COLOSTOMY      EXPLORATORY LAPAROTOMY W/ BOWEL RESECTION N/A 1/31/2016    Procedure: exploratory laparotomy, left sigmoidectomy, coloproctostomy, take down splenic flexure, loop colostomy;  Surgeon: Vivian Tavarez MD;  [facility];  Service:     WA CLOSE ENTEROSTOMY N/A 7/22/2016    Procedure: SEGMENTAL COLECTOMY WITH COLOCOLOSTOMY;  Surgeon: Vivian Tavarez MD;  [facility];  Service: Colorectal    UPPER GASTROINTESTINAL ENDOSCOPY        Family History:     Family History   Problem Relation Age of Onset    Alcohol abuse Mother     Alcohol abuse Father     Diabetes Sister     No Known Problems Sister     No Known Problems Sister       Social History:     Social History     Socioeconomic History    Marital status: Single     Spouse name: None    Number of children: None    Years of education: None    Highest education level: None   Occupational History    None   Tobacco Use    Smoking status: Never Smoker    Smokeless tobacco: Never Used   Vaping Use    Vaping Use: Never used   Substance and Sexual Activity    Alcohol use: Not Currently    Drug use: No    Sexual activity: Never   Other Topics Concern    None   Social History Narrative    Lives in a group home      Social Determinants of Health     Financial Resource Strain: Low Risk     Difficulty of Paying Living Expenses: Not hard at all   Food Insecurity: No Food Insecurity    Worried About Running Out of Food in the Last Year: Never true    Simran of Food in the Last Year: Never true   Transportation Needs: No Transportation Needs    Lack of Transportation (Medical): No    Lack of Transportation (Non-Medical):  No   Physical Activity: Not on file   Stress: Not on file   Social Connections: Not on file   Intimate Partner Violence: Not on file   Housing Stability: Low Risk     Unable to Pay for Housing in the Last Year: No    Number of Places Lived in the Last Year: 1    Unstable Housing in the Last Year: No      Medications and Allergies:     Current Outpatient Medications   Medication Sig Dispense Refill    acetaminophen (TYLENOL) 160 mg/5 mL liquid Take 10 mL (320 mg total) by mouth every 4 (four) hours as needed for mild pain or fever 236 mL 3    ARIPiprazole (ABILIFY) 2 mg tablet       baclofen 10 mg tablet Take 1 tablet (10 mg total) by mouth 3 (three) times a day 90 tablet 11    benzonatate (TESSALON PERLES) 100 mg capsule Take 1 capsule (100 mg total) by mouth every 8 (eight) hours as needed for cough 30 capsule 0    calcium carbonate (OS-MORRIS) 600 MG tablet Take 1 tablet (600 mg total) by mouth 2 (two) times a day with meals 60 tablet 2    carbamide peroxide (DEBROX) 6 5 % otic solution Administer 2 drops into both ears daily as needed 2 drops in the affected ear prn x 5 days      carbidopa-levodopa (Sinemet)  mg per tablet Take 1 5 tabs  tablet 1    carboxymethylcellulose (REFRESH PLUS) 0 5 % SOLN 1 drop 3 (three) times a day as needed for dry eyes      divalproex sodium (DEPAKOTE SPRINKLE) 125 MG capsule 2 (two) times a day 4 caps BID      docosanol (ABREVA) 10 % Apply topically 5 (five) times a day Apply and rub in 5x a day until healed as needed for lesion  0    ferrous sulfate 324 (65 Fe) mg Take 325 mg by mouth 2 (two) times a day        fexofenadine (ALLEGRA) 180 MG tablet Take 180 mg by mouth as needed        fexofenadine (ALLEGRA) 30 MG/5ML suspension Take 30 mL (180 mg total) by mouth daily 237 mL 3    fluticasone (FLONASE) 50 mcg/act nasal spray 1 spray into each nostril daily as needed for rhinitis or allergies 1 Bottle 1    glimepiride (AMARYL) 1 mg tablet Take 1 tablet (1 mg total) by mouth daily with breakfast 90 tablet 3    guaiFENesin (ROBITUSSIN) 100 MG/5ML oral liquid Take 200 mg by mouth every 8 (eight) hours as needed 2 tsp every 8 hours as needed for cough      hydrocortisone 1 % cream Apply topically to affected area twice daily as needed for rash 30 g 0    levothyroxine (SYNTHROID) 25 mcg tablet Take 1 tablet (25 mcg) by mouth every morning  0    LORazepam (ATIVAN) 0 5 mg tablet Take 0 5 mg by mouth daily at bedtime        metFORMIN (GLUCOPHAGE) 1000 MG tablet Take 1 tablet (1,000 mg total) by mouth 2 (two) times a day with meals 60 tablet 2    neomycin-bacitracin-polymyxin b (NEOSPORIN) ointment Apply 1 application topically 2 (two) times a day as needed      nitrofurantoin (MACRODANTIN) 50 mg capsule Take 50 mg by mouth daily at bedtime      olopatadine HCl (PATADAY) 0 2 % opth drops Administer 1 drop to both eyes as needed      Oxybutynin Chloride (DITROPAN XL PO) Take 5 mg by mouth 3 (three) times a day       polyethylene glycol (GLYCOLAX) 17 GM/SCOOP powder DISSOLVE 17GM (1 CAPFUL) IN 8 OZ FLUIDS AND CONSUME BY MOUTH EVERY MORNING *HOLD 1 DAY FOR LOOSE STOOLS* (CONSTIPATION) 510 g 10    simvastatin (ZOCOR) 20 mg tablet Take 1 tablet (20 mg total) by mouth daily with dinner at 4 pm for hyperlipidemia      Starch, Thickening, POWD Take by mouth daily Use on food and liquid as directed 1020 g 3    traZODone (DESYREL) 100 mg tablet Take 1 tablet (100 mg total) by mouth daily at  8 pm for depression  0    Vitamins A & D (VITAMIN A & D) ointment Apply topically as needed for dry skin      citalopram (CeleXA) 40 mg tablet       denosumab (PROLIA) 60 mg/mL Inject 1 mL (60 mg total) under the skin once for 1 dose 1 mL 1    linaGLIPtin (Tradjenta) 5 MG TABS Take 5 mg by mouth daily (Patient not taking: No sig reported)      omeprazole (PriLOSEC) 20 mg delayed release capsule Take 1 capsule (20 mg total) by mouth 2 (two) times a day before meals 60 capsule 2    valACYclovir (VALTREX) 1,000 mg tablet Take 1 tablet (1,000 mg total) by mouth 3 (three) times a day for 7 days (Patient not taking: Reported on 1/21/2022 ) 21 tablet 0     No current facility-administered medications for this visit  Allergies   Allergen Reactions    Keflex [Cephalexin] GI Intolerance      Immunizations:     Immunization History   Administered Date(s) Administered    COVID-19 PFIZER VACCINE 0 3 ML IM 01/22/2021, 02/12/2021, 11/16/2021, 08/29/2022    H1N1, All Formulations 04/07/2010    Hep B, adult 01/16/2015, 03/20/2015, 08/24/2015    Influenza Quadrivalent Preservative Free 3 years and older IM 11/17/2014, 11/09/2015, 10/28/2016    Influenza Quadrivalent, 6-35 Months IM 10/26/2017    Influenza, injectable, quadrivalent, preservative free 0 5 mL 11/07/2018    Influenza, recombinant, quadrivalent,injectable, preservative free 10/28/2019, 09/25/2020, 12/02/2021    Influenza, seasonal, injectable 10/08/2013    Pneumococcal Polysaccharide PPV23 11/04/2008, 01/23/2018    Tdap 11/04/2008, 06/06/2018    Tuberculin Skin Test-PPD Intradermal 04/11/2016, 01/23/2018, 11/26/2019, 06/25/2021      Health Maintenance:         Topic Date Due    Breast Cancer Screening: Mammogram  12/16/2023    Cervical Cancer Screening  08/10/2024    Colorectal Cancer Screening  03/09/2027    HIV Screening  Completed    Hepatitis C Screening  Completed         Topic Date Due    Pneumococcal Vaccine: Pediatrics (0 to 5 Years) and At-Risk Patients (6 to 59 Years) (2 - PCV) 01/23/2019    Influenza Vaccine (1) 09/01/2022      Medicare Screening Tests and Risk Assessments:     Vani Logan is here for her Subsequent Wellness visit  Last Medicare Wellness visit information reviewed, patient interviewed and updates made to the record today  Historian  Patient cannot answer questions due to cognitive impairment, intelluctual disability, or expressive limitations  Health Risk Assessment:   Patient rates overall health as good  Patient feels that their physical health rating is slightly worse  Patient is satisfied with their life  Eyesight was rated as same  Hearing was rated as same   Patient feels that their emotional and mental health rating is same  Patients states they are never, rarely angry  Patient states they are sometimes unusually tired/fatigued  Pain experienced in the last 7 days has been none  Patient states that she has experienced no weight loss or gain in last 6 months  Depression Screening:   PHQ-2 Score: 0      Fall Risk Screening: In the past year, patient has experienced: history of falling in past year    Number of falls: 2 or more  Injured during fall?: No    Feels unsteady when standing or walking?: No    Worried about falling?: No      Urinary Incontinence Screening:   Patient has leaked urine accidently in the last six months  Home Safety:  Patient does not have trouble with stairs inside or outside of their home  Patient has working smoke alarms and has working carbon monoxide detector  Home safety hazards include: none  Lives in an [facility] facility    Nutrition:   Current diet is Diabetic  1500 jerry Puree diet with Honey thickened liquids    Medications:   Patient is not currently taking any over-the-counter supplements  Patient is not able to manage medications  Activities of Daily Living (ADLs)/Instrumental Activities of Daily Living (IADLs):   Walk and transfer into and out of bed and chair?: No  Dress and groom yourself?: No    Bathe or shower yourself?: No    Feed yourself? No  Do your laundry/housekeeping?: No  Manage your money, pay your bills and track your expenses?: No  Make your own meals?: No    Do your own shopping?: No    ADL comments: complete assistance    Durable Medical Equipment Suppliers  [employer]    Previous Hospitalizations:   Any hospitalizations or ED visits within the last 12 months?: Yes    How many hospitalizations have you had in the last year?: 1-2    Advance Care Planning:   Living will: No    Durable POA for healthcare:  Yes    Advanced directive: No      PREVENTIVE SCREENINGS      Cardiovascular Screening:    General: Screening Not Indicated and History Lipid Disorder Diabetes Screening:     General: Screening Not Indicated and History Diabetes      Colorectal Cancer Screening:     General: Screening Current      Breast Cancer Screening:     General: Screening Current      Cervical Cancer Screening:    General: Screening Current      Osteoporosis Screening:    General: Screening Not Indicated and History Osteoporosis      Abdominal Aortic Aneurysm (AAA) Screening:        General: Screening Not Indicated      Lung Cancer Screening:     General: Screening Not Indicated      Hepatitis C Screening:    General: Screening Current    Screening, Brief Intervention, and Referral to Treatment (SBIRT)    Screening  Typical number of drinks in a day: 0  Typical number of drinks in a week: 0  Interpretation: Low risk drinking behavior  AUDIT-C Screenin) How often did you have a drink containing alcohol in the past year? never  2) How many drinks did you have on a typical day when you were drinking in the past year? 0  3) How often did you have 6 or more drinks on one occasion in the past year? never    AUDIT-C Score: 0  Interpretation: Score 0-2 (female): Negative screen for alcohol misuse    Single Item Drug Screening:  How often have you used an illegal drug (including marijuana) or a prescription medication for non-medical reasons in the past year? never    Single Item Drug Screen Score: 0  Interpretation: Negative screen for possible drug use disorder    Other Counseling Topics:   Calcium and vitamin D intake  No exam data present     Physical Exam:     /82 (BP Location: Right arm, Patient Position: Sitting, Cuff Size: Standard)   Pulse 96   Temp (!) 97 2 °F (36 2 °C) (Temporal)   Resp 16   Ht 4' 10" (1 473 m)   Wt 49 9 kg (110 lb)   LMP 2009 (Exact Date)   BMI 22 99 kg/m²     Physical Exam  Vitals reviewed  Exam conducted with a chaperone present  Constitutional:       General: She is not in acute distress  Appearance: Normal appearance   She is well-developed  She is not ill-appearing  HENT:      Head: Normocephalic and atraumatic  Mouth/Throat:      Mouth: Mucous membranes are moist       Pharynx: Oropharynx is clear  Eyes:      Extraocular Movements: Extraocular movements intact  Conjunctiva/sclera: Conjunctivae normal    Cardiovascular:      Rate and Rhythm: Normal rate and regular rhythm  Heart sounds: Normal heart sounds  No murmur heard  Pulmonary:      Effort: Pulmonary effort is normal  No respiratory distress  Breath sounds: Normal breath sounds  Musculoskeletal:      Comments: In wheelchair   Skin:     General: Skin is warm and dry  Neurological:      Mental Status: She is alert  Mental status is at baseline     Psychiatric:         Mood and Affect: Mood normal           Tu Morfin MD

## 2022-09-30 NOTE — TELEPHONE ENCOUNTER
Call from Haile Lopes with [employer] she needs changes to prescription directions  Fexofenadine liquid needs to be daily/PRN for seasonal allergies  Acetaminophen liquid needs to say every 4 hrs PRN for fever greater than 100 4

## 2022-09-30 NOTE — ASSESSMENT & PLAN NOTE
Switched Tylenol 325mg PRN from pill to liquid - caregiver will mix with foods and add thickener given hx of dysphagia

## 2022-09-30 NOTE — ASSESSMENT & PLAN NOTE
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, parkinsonian sx, bipolar (on valproate), spasticity/torticollis, osteoporosis  PHQ-2 score: negative score of 0  Screenings:   - CV screening - up to date, labs reviewed   - Colonoscopy - last done 2021, due in 2026 due to incomplete bowel prep  - Mammogram - last done 2021, mammogram this year scheduled  - Pap smear - last done 2021, negative cotest, next due 2026  - Osteoporosis - recent DEXA shows improvement with osteopenia  Next due in 2 years  Vaccines - getting flu vaccine later in October, with new COVID booster afterwards   Also interested in getting Shingles vaccine - must receive at pharmacy given Medicare insurance  Ordered CMP as patient is due for Prolia injection in December  Follow-up in 1 year   Annual gyn exam scheduled for next week

## 2022-10-05 DIAGNOSIS — I69.398 SPASTICITY AS LATE EFFECT OF CEREBROVASCULAR ACCIDENT (CVA): ICD-10-CM

## 2022-10-05 DIAGNOSIS — R25.2 SPASTICITY AS LATE EFFECT OF CEREBROVASCULAR ACCIDENT (CVA): ICD-10-CM

## 2022-10-05 DIAGNOSIS — F31.9 BIPOLAR AFFECTIVE DISORDER, REMISSION STATUS UNSPECIFIED (HCC): ICD-10-CM

## 2022-10-06 RX ORDER — TRAZODONE HYDROCHLORIDE 100 MG/1
TABLET ORAL
Qty: 90 TABLET | Refills: 0 | Status: SHIPPED | OUTPATIENT
Start: 2022-10-06

## 2022-10-07 ENCOUNTER — ANNUAL EXAM (OUTPATIENT)
Dept: FAMILY MEDICINE CLINIC | Facility: CLINIC | Age: 62
End: 2022-10-07

## 2022-10-07 VITALS
HEIGHT: 58 IN | WEIGHT: 110 LBS | SYSTOLIC BLOOD PRESSURE: 126 MMHG | BODY MASS INDEX: 23.09 KG/M2 | HEART RATE: 86 BPM | RESPIRATION RATE: 18 BRPM | DIASTOLIC BLOOD PRESSURE: 74 MMHG | OXYGEN SATURATION: 97 % | TEMPERATURE: 96.2 F

## 2022-10-07 DIAGNOSIS — M16.0 OSTEOARTHRITIS OF BOTH HIPS, UNSPECIFIED OSTEOARTHRITIS TYPE: ICD-10-CM

## 2022-10-07 DIAGNOSIS — Z01.419 ENCOUNTER FOR ANNUAL ROUTINE GYNECOLOGICAL EXAMINATION: Primary | ICD-10-CM

## 2022-10-07 PROCEDURE — G0101 CA SCREEN;PELVIC/BREAST EXAM: HCPCS | Performed by: FAMILY MEDICINE

## 2022-10-07 RX ORDER — ACETAMINOPHEN 160 MG/5ML
320 SUSPENSION ORAL EVERY 4 HOURS PRN
Qty: 236 ML | Refills: 3 | Status: SHIPPED | OUTPATIENT
Start: 2022-10-07

## 2022-10-07 RX ORDER — BACLOFEN 10 MG/1
10 TABLET ORAL 3 TIMES DAILY
Qty: 90 TABLET | Refills: 11 | Status: SHIPPED | OUTPATIENT
Start: 2022-10-07

## 2022-10-07 NOTE — PROGRESS NOTES
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, Parkinsonian sx, bipolar here for annual gyn exam  PE is negative  · Screening exams are all up to date, mammogram scheduled later this year  No other issues at this time  · Hx of osteoporosis, on Prolia (next due in December)          2  Osteoarthritis of both hips, unspecified osteoarthritis type  -     acetaminophen (TYLENOL) 160 mg/5 mL liquid; Take 10 mL (320 mg total) by mouth every 4 (four) hours as needed for mild pain or fever (Fevers greater than 100 4 F)        Subjective      Madyson Velazco is a 63 y o  female who presents for annual well woman exam  Postmenopausal    GYN:  · No vaginal discharge, labial erythema or lesions, dyspareunia  · Menarche unknown  · No menses - last LMP years ago  · Contraception: Previously on Depo-provera (last 8/15/12)  · Patient is not sexually active  · Last pap smear was in   Results were negative  · No gynecologic surgeries  OB:  ·  female  · No pregnancies    :  · No dysuria, urinary frequency or urgency  · No hematuria, flank pain, incontinence  Breast:  · No breast mass, skin changes, dimpling, reddening, nipple retraction  · No breast discharge  · Last mammogram was in 2021  Results were negative, next mammogram scheduled  · Patient does not have a family history of breast, endometrial, or ovarian ca  General:  · Diet: 1500 calorie ADA puree diet with honey thickened liquids  · Exercise: Uses wheelchair, walker  · Work: N/A  · ETOH use: None  · Tobacco use: None  · Recreational drug use: None    Screening:  · Cervical cancer: last pap smear in   Results were negative, due in   · Breast cancer: last mammogram in   Results were negative, scheduled for later this year  · Colon cancer: last colonoscopy in    Due for repeat in 5 years due to inadequate bowel prep   · STD screening: Not required  Review of Systems   Unable to perform ROS: Patient nonverbal         Objective   Vitals:    10/07/22 1321   BP: 126/74   Pulse: 86   Resp: 18   Temp: (!) 96 2 °F (35 7 °C)   SpO2: 97%             Physical Exam  Vitals reviewed  Exam conducted with a chaperone present  Constitutional:       General: She is not in acute distress  Appearance: Normal appearance  She is not ill-appearing  HENT:      Head: Normocephalic and atraumatic  Nose: Nose normal    Eyes:      Extraocular Movements: Extraocular movements intact  Conjunctiva/sclera: Conjunctivae normal    Cardiovascular:      Rate and Rhythm: Normal rate and regular rhythm  Heart sounds: Normal heart sounds  No murmur heard  Pulmonary:      Effort: Pulmonary effort is normal  No respiratory distress  Breath sounds: Normal breath sounds  Chest:      Chest wall: No mass, lacerations or tenderness  Breasts: Danny Score is 5  Breasts are symmetrical       Right: Normal  No mass, nipple discharge, skin change, tenderness, axillary adenopathy or supraclavicular adenopathy  Left: Normal  No mass, nipple discharge, skin change, tenderness, axillary adenopathy or supraclavicular adenopathy  Abdominal:      General: Abdomen is flat  Palpations: Abdomen is soft  Tenderness: There is no abdominal tenderness  Musculoskeletal:         General: Normal range of motion  Cervical back: Normal range of motion  Lymphadenopathy:      Upper Body:      Right upper body: No supraclavicular or axillary adenopathy  Left upper body: No supraclavicular or axillary adenopathy  Neurological:      Mental Status: She is alert and oriented to person, place, and time     Psychiatric:         Mood and Affect: Mood normal          Behavior: Behavior normal

## 2022-10-07 NOTE — ASSESSMENT & PLAN NOTE
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

## 2022-10-10 ENCOUNTER — TELEPHONE (OUTPATIENT)
Dept: FAMILY MEDICINE CLINIC | Facility: CLINIC | Age: 62
End: 2022-10-10

## 2022-10-11 PROBLEM — Z12.11 SCREENING FOR COLON CANCER: Status: RESOLVED | Noted: 2019-11-05 | Resolved: 2022-10-11

## 2022-10-11 NOTE — ASSESSMENT & PLAN NOTE
"Re-Assessment / Re-Certification      Patient: Alicia Melo   : 1962  Diagnosis/ICD-10 Code:  S/P TKR (total knee replacement), left [Z96.652]  Referring practitioner: Rigoberto Pickering MD  Date of Initial Visit: Type: THERAPY  Noted: 2022  Today's Date: 10/11/2022  Patient seen for 17 sessions      Subjective:   Alicia Melo reports: she got up from the floor 3 times on  but noted increased soreness and swelling into her knee yesterday and today.  She feels like she regressed slightly.  Clinical Progress: improved  Home Program Compliance: Yes  Treatment has included: therapeutic exercise, neuromuscular re-education, manual therapy, therapeutic activity, gait training, moist heat and cryotherapy    Subjective   Objective   L knee flexion strength 4+/5  L knee extension strength 4+/5    L knee flexion AROM 110 degrees  L knee extension AROM lacking 3 degrees from neutral    Assessment/Plan  Progress toward previous goals: Partially Met  STG to be met in 3 wk:  - Increase L knee AROM to 0-110 deg. MET  - Pt to ambulate without assistive devide safely in clinic with good gait pattern. MET   - Pt to be independent with HEP. MET   LTG to be met in 12 wk:  - Improve [place] Knee to 15% or less impairment and LEFS to 80% ability. NOT ASSESSED TODAY   - Increase L knee strength to 4+/5 or greater for improved stair climbing. MET    - Ambulation to be returned to PLOF. NOT MET, progressing    Patient has attended 17 PT sessions with good progress towards goals.  Patient demonstrates increased strength and ROM of L knee.  Slight decrease in ROM noted today with flexion due to increased swelling from activity over the weekend.  Patient able to progress to 8\" step ups with mild contralateral push off noted.  Gait mechanics have also improved with a normalized pattern noted.  Progress discussed with patient.  Plan for patient to attend 2 additional visits and then d/c with HEP.       Recommendations: " Lab Results   Component Value Date    HGBA1C 5 8 (H) 08/01/2020       No results for input(s): POCGLU in the last 72 hours      Blood Sugar Average: Last 72 hrs:    Plan:  · Hold home metformin  · Insulin sliding scale and Accu-Chek  · Diabetic Continue as planned  Timeframe: 2 weeks  Prognosis to achieve goals: good    PT Signature: Angi Palmer PT  Physical Therapist  [place] License # [id number]  Electronically signed by Angi Palmer PT, 10/11/22, 8:14 AM EDT    Certification Period: 10/13/2022 thru 1/8/2023  I certify that the therapy services are furnished while this patient is under my care.  The services outline above are required by this patient, and will be reviewed every 90 days.    Based upon review of the patient's progress and continued therapy plan, it is my medical opinion that Alicia Melo should continue physical therapy treatment at [facility]  [address] [place] [address] [address] [place] [address] [phone].    Signature: ______________________________________________  Rigoberto Pickering MD  Date: _____________________________________    Timed:         Manual Therapy:    11     mins  10024;     Therapeutic Exercise:    20     mins  32245;     Neuromuscular Adrienne:        mins  70322;    Therapeutic Activity:          mins  98892;     Gait Training:           mins  79559;     Ultrasound:          mins  58886;    Ionto                                   mins   96569  Self Care                            mins   95840    Un-Timed:  Electrical Stimulation:         mins  17652 ( );  Dry Needling          mins self-pay  Traction          mins 73536  Low Eval          Mins  76732  Mod Eval          Mins  33887  High Eval                            Mins  87443  Re-Eval                               mins  24220      Timed Treatment:   31   mins   Total Treatment:     41   mins

## 2022-10-14 ENCOUNTER — VBI (OUTPATIENT)
Dept: ADMINISTRATIVE | Facility: OTHER | Age: 62
End: 2022-10-14

## 2022-10-17 ENCOUNTER — TELEPHONE (OUTPATIENT)
Dept: NEUROLOGY | Facility: CLINIC | Age: 62
End: 2022-10-17

## 2022-10-17 NOTE — TELEPHONE ENCOUNTER
Called [facility] and spoke with Lyn Castillo regarding the delivery status of patients Botox medication  Lyn Castillo informed me that patients account is being processed with the insurance review department and that he did send a message requesting this be expedited as STAT and this patients Botox order/delivery should be ready for scheduling within the next 24-48 Hrs  Also gave verbal RX to [employer] (pharmacist) for:     Botox-100 units   Qty:3 for total of 300 units   Refill:3     Jeffrey Greene did expedite this order to be processed STAT per my request      Will follow up on the status of this order tomorrow 10/18/2022

## 2022-10-20 NOTE — TELEPHONE ENCOUNTER
Called [facility] and spoke with [facility] regarding the delivery status of patients Botox medication  Edilma informed me that patients Botox order/delivery is pending verbal consent to have Botox delivered to our office  Called [facility] patient resides and spoke with patients charge nurse Sree Mina who informed me that she will call [facility] today and provide them with the consent to have patients medication delivered  Will follow up on the delivery status of this order later today

## 2022-10-20 NOTE — TELEPHONE ENCOUNTER
[address] and scheduled delivery with Kash Abraham for:    Botox-200 units  Qty:1  Delivery to [facility]  Scheduled for 10/21/2022  Via: [employer]    Please advise if medication doesn't arrive

## 2022-10-21 NOTE — TELEPHONE ENCOUNTER
Botox number of units: 100 Units  Botox quantity: 3  Arrived at what location: [facility]  Botox at [facility]: yes  NDC number: [id number]  Lot number: [id number]  Expiration Date: 04/30/2024  Appt notes indicate correct medication: yes

## 2022-10-27 ENCOUNTER — TELEPHONE (OUTPATIENT)
Dept: NEUROLOGY | Facility: CLINIC | Age: 62
End: 2022-10-27

## 2022-11-02 ENCOUNTER — TELEPHONE (OUTPATIENT)
Dept: NEUROLOGY | Facility: CLINIC | Age: 62
End: 2022-11-02

## 2022-11-04 ENCOUNTER — OFFICE VISIT (OUTPATIENT)
Dept: FAMILY MEDICINE CLINIC | Facility: CLINIC | Age: 62
End: 2022-11-04

## 2022-11-04 VITALS
WEIGHT: 110 LBS | DIASTOLIC BLOOD PRESSURE: 78 MMHG | HEART RATE: 86 BPM | TEMPERATURE: 96.2 F | BODY MASS INDEX: 23.09 KG/M2 | RESPIRATION RATE: 18 BRPM | OXYGEN SATURATION: 95 % | HEIGHT: 58 IN | SYSTOLIC BLOOD PRESSURE: 120 MMHG

## 2022-11-04 DIAGNOSIS — B34.9 VIRAL ILLNESS: Primary | ICD-10-CM

## 2022-11-04 RX ORDER — GUAIFENESIN 100 MG/5ML
10 SYRUP ORAL EVERY 4 HOURS PRN
Qty: 120 ML | Refills: 0 | Status: SHIPPED | OUTPATIENT
Start: 2022-11-04

## 2022-11-04 NOTE — ASSESSMENT & PLAN NOTE
- onset of sxs was 11/2 with fatigue and fever, tmax was 102 8  - sxs managed with fever  - no cough, abd pain, nausea, vomiting, diarrhea  - COVID vaccinated x 4     Plan:  - follow up PCR testing from 11/1 from group home  - re-tested PCR today  - return on 11/7, day 5, for re-evaluation to see if medication is needed  - isolation/return/ED precautions reviewed  - continue supportive care

## 2022-11-04 NOTE — PROGRESS NOTES
Name: Marixa De Dios      : 1960      MRN: [id number]  Encounter Provider: Alicia Pizano DO  Encounter Date: 2022   Encounter department: [address] [place] [address]  Viral illness  Assessment & Plan:  - onset of sxs was  with fatigue and fever, tmax was 102 8  - sxs managed with fever  - no cough, abd pain, nausea, vomiting, diarrhea  - COVID vaccinated x 4     Plan:  - follow up PCR testing from  from group home  - re-tested PCR today  - return on , day 5, for re-evaluation to see if medication is needed  - isolation/return/ED precautions reviewed  - continue supportive care     Orders:  -     Covid/Flu- Office Collect  -     guaiFENesin (ROBITUSSIN) 100 MG/5ML oral liquid; Take 10 mL (200 mg total) by mouth every 4 (four) hours as needed for cough or congestion         Subjective      Augusta Jane is a 64 yo F with history of spastic quadriplegia and parkinsonism who presents for fever with tmax of 102 8 F  She is nonverbal, and lives in a group home  She is here today with charge nurse Oscar Mata and   Karissa Patricia, who both provided history  She was seen simultaneously with fellow group home resident Krissy Benitesist  Kasey Pandya first developed a fever of 100 4 2 days ago (Wed 22) with fatigue (sleeping all day) and again spiked a fever to 102 8 last night (Thu 11/3/22)  She has had no cough, no SOB, no increased work of breathing  No nausea/vomiting/diarrhea  Her home O2 sats have been ~  Completed a COVID PCR on  which is still pending but the is currently a COVID outbreak in the home  Review of Systems   Constitutional: Positive for fatigue and fever  Negative for chills  HENT: Negative for congestion  Respiratory: Negative for cough and shortness of breath  Cardiovascular: Negative for chest pain  Gastrointestinal: Negative for diarrhea, nausea and vomiting         Current Outpatient Medications on File Prior to Visit   Medication Sig   • acetaminophen (TYLENOL) 160 mg/5 mL liquid Take 10 mL (320 mg total) by mouth every 4 (four) hours as needed for mild pain or fever (Fevers greater than 100 4 F)   • ARIPiprazole (ABILIFY) 2 mg tablet Take 1 tablet by mouth daily at bedtime   • baclofen 10 mg tablet Take 1 tablet (10 mg total) by mouth 3 (three) times a day   • benzonatate (TESSALON PERLES) 100 mg capsule Take 1 capsule (100 mg total) by mouth every 8 (eight) hours as needed for cough   • calcium carbonate (OS-MORRIS) 600 MG tablet Take 1 tablet (600 mg total) by mouth 2 (two) times a day with meals   • carbamide peroxide (DEBROX) 6 5 % otic solution 2 drops 2 drops in the affected ear prn x 5 days   • carbidopa-levodopa (Sinemet)  mg per tablet Take 1 5 tabs TID   • carboxymethylcellulose (REFRESH PLUS) 0 5 % SOLN 1 drop 3 (three) times a day as needed for dry eyes   • citalopram (CeleXA) 40 mg tablet Take 1 tablet by mouth daily   • divalproex sodium (DEPAKOTE SPRINKLE) 125 MG capsule 4 capsules 2 (two) times a day 4 caps BID   • docosanol (ABREVA) 10 % Apply topically 5 (five) times a day Apply and rub in 5x a day until healed as needed for lesion   • ferrous sulfate 324 (65 Fe) mg Take 1 tablet by mouth 2 (two) times a day   • fexofenadine (ALLEGRA) 30 MG/5ML suspension Take 30 mL (180 mg total) by mouth daily as needed (seasonal allergies)   • fluticasone (FLONASE) 50 mcg/act nasal spray 1 spray into each nostril daily as needed for rhinitis or allergies   • glimepiride (AMARYL) 1 mg tablet Take 1 tablet (1 mg total) by mouth daily with breakfast   • hydrocortisone 1 % cream Apply topically to affected area twice daily as needed for rash   • levothyroxine (SYNTHROID) 25 mcg tablet Take 1 tablet (25 mcg) by mouth every morning   • linaGLIPtin (Tradjenta) 5 MG TABS Take 5 mg by mouth daily   • LORazepam (ATIVAN) 0 5 mg tablet Take 0 5 mg by mouth daily Every day at 8pm   • metFORMIN (GLUCOPHAGE) 1000 MG tablet Take 1 tablet (1,000 mg total) by mouth 2 (two) times a day with meals   • neomycin-bacitracin-polymyxin b (NEOSPORIN) ointment Apply 1 application topically 2 (two) times a day as needed Apply to affected area BID PRN   • nitrofurantoin (MACRODANTIN) 50 mg capsule Take 1 capsule by mouth daily at bedtime   • olopatadine HCl (PATADAY) 0 2 % opth drops Administer 1 drop to both eyes as needed Instill 1 drop into affected eyes daily PRN for eye redness   • omeprazole (PriLOSEC) 20 mg delayed release capsule Take 1 capsule (20 mg total) by mouth 2 (two) times a day before meals   • Oxybutynin Chloride (DITROPAN XL PO) Take 5 mg by mouth 3 (three) times a day 1 tablet TID   • polyethylene glycol (GLYCOLAX) 17 GM/SCOOP powder DISSOLVE 17GM (1 CAPFUL) IN 8 OZ FLUIDS AND CONSUME BY MOUTH EVERY MORNING *HOLD 1 DAY FOR LOOSE STOOLS* (CONSTIPATION)   • simvastatin (ZOCOR) 20 mg tablet Take 1 tablet (20 mg total) by mouth daily with dinner at 4 pm for hyperlipidemia   • Starch, Thickening, POWD Take by mouth daily Use on food and liquid as directed   • traZODone (DESYREL) 100 mg tablet Take 1 tablet (100 mg total) by mouth daily at  8 pm for depression   • Vitamins A & D (VITAMIN A & D) ointment Apply topically as needed for skin irritation   • [DISCONTINUED] guaiFENesin (ROBITUSSIN) 100 MG/5ML oral liquid Take 10 mL by mouth every 8 (eight) hours as needed   • denosumab (PROLIA) 60 mg/mL Inject 1 mL (60 mg total) under the skin once for 1 dose   • valACYclovir (VALTREX) 1,000 mg tablet Take 1 tablet (1,000 mg total) by mouth 3 (three) times a day for 7 days       Objective     /78 (BP Location: Left arm, Patient Position: Sitting, Cuff Size: Standard)   Pulse 86   Temp (!) 96 2 °F (35 7 °C) (Temporal)   Resp 18   Ht 4' 10" (1 473 m)   Wt 49 9 kg (110 lb)   LMP 11/29/2009 (Exact Date)   SpO2 95%   BMI 22 99 kg/m²     Physical Exam  Constitutional:       Appearance: Normal appearance  HENT:      Head: Normocephalic  Right Ear: External ear normal       Left Ear: External ear normal       Nose: Nose normal       Mouth/Throat:      Pharynx: Oropharynx is clear  Eyes:      Conjunctiva/sclera: Conjunctivae normal    Cardiovascular:      Rate and Rhythm: Normal rate and regular rhythm  Pulses: Normal pulses  Heart sounds: Normal heart sounds  Pulmonary:      Effort: Pulmonary effort is normal       Breath sounds: Normal breath sounds  Abdominal:      General: Abdomen is flat  Bowel sounds are normal       Palpations: Abdomen is soft  Skin:     General: Skin is warm and dry  Neurological:      General: No focal deficit present  Mental Status: She is alert and oriented to person, place, and time     Psychiatric:         Mood and Affect: Mood normal          Behavior: Behavior normal        Inderjit Sheppard DO

## 2022-11-05 LAB
FLUAV RNA RESP QL NAA+PROBE: NEGATIVE
FLUBV RNA RESP QL NAA+PROBE: NEGATIVE
SARS-COV-2 RNA RESP QL NAA+PROBE: POSITIVE

## 2022-11-07 ENCOUNTER — OFFICE VISIT (OUTPATIENT)
Dept: FAMILY MEDICINE CLINIC | Facility: CLINIC | Age: 62
End: 2022-11-07

## 2022-11-07 VITALS
DIASTOLIC BLOOD PRESSURE: 72 MMHG | SYSTOLIC BLOOD PRESSURE: 104 MMHG | RESPIRATION RATE: 22 BRPM | TEMPERATURE: 97.8 F | HEART RATE: 85 BPM | OXYGEN SATURATION: 99 %

## 2022-11-07 DIAGNOSIS — U07.1 COVID-19 VIRUS INFECTION: Primary | ICD-10-CM

## 2022-11-07 NOTE — PROGRESS NOTES
Name: Bryan De Paz      : 1960      MRN: [id number]  Encounter Provider: Thais Mensah MD  Encounter Date: 2022   Encounter department: [address] [place] [address]  COVID-19 virus infection  Assessment & Plan:  Symptoms started on: 2022  Tested positive on:2022  Current Symptoms: COVID-19 SYMPTOMS: cough (mild) and nasal congestion  Encouraged  Tylenol as needed for fever and discomfort  Encouraged continued self-isolation, PO hydration, frequent hand washing, use of masks, and cleaning of commonly used surfaces                Subjective      HPI   Pt here with caregiver for COVID f/u  Overall, doing ok  Sx onset on Thursday evening with fever  Currently caregiver reports pt  started with mild cough and also has nasal congestion  Denies fevers, sob, diarrhea  Gar Hires is eating and drinking ok  Review of Systems  Patient in non verbal at baseline      Current Outpatient Medications on File Prior to Visit   Medication Sig   • acetaminophen (TYLENOL) 160 mg/5 mL liquid Take 10 mL (320 mg total) by mouth every 4 (four) hours as needed for mild pain or fever (Fevers greater than 100 4 F)   • ARIPiprazole (ABILIFY) 2 mg tablet Take 1 tablet by mouth daily at bedtime   • baclofen 10 mg tablet Take 1 tablet (10 mg total) by mouth 3 (three) times a day   • benzonatate (TESSALON PERLES) 100 mg capsule Take 1 capsule (100 mg total) by mouth every 8 (eight) hours as needed for cough   • calcium carbonate (OS-MORRIS) 600 MG tablet Take 1 tablet (600 mg total) by mouth 2 (two) times a day with meals   • carbamide peroxide (DEBROX) 6 5 % otic solution 2 drops 2 drops in the affected ear prn x 5 days   • carbidopa-levodopa (Sinemet)  mg per tablet Take 1 5 tabs TID   • carboxymethylcellulose (REFRESH PLUS) 0 5 % SOLN 1 drop 3 (three) times a day as needed for dry eyes   • citalopram (CeleXA) 40 mg tablet Take 1 tablet by mouth daily • divalproex sodium (DEPAKOTE SPRINKLE) 125 MG capsule 4 capsules 2 (two) times a day 4 caps BID   • docosanol (ABREVA) 10 % Apply topically 5 (five) times a day Apply and rub in 5x a day until healed as needed for lesion   • fexofenadine (ALLEGRA) 30 MG/5ML suspension Take 30 mL (180 mg total) by mouth daily as needed (seasonal allergies)   • fluticasone (FLONASE) 50 mcg/act nasal spray 1 spray into each nostril daily as needed for rhinitis or allergies   • glimepiride (AMARYL) 1 mg tablet Take 1 tablet (1 mg total) by mouth daily with breakfast   • guaiFENesin (ROBITUSSIN) 100 MG/5ML oral liquid Take 10 mL (200 mg total) by mouth every 4 (four) hours as needed for cough or congestion   • hydrocortisone 1 % cream Apply topically to affected area twice daily as needed for rash   • levothyroxine (SYNTHROID) 25 mcg tablet Take 1 tablet (25 mcg) by mouth every morning   • linaGLIPtin (Tradjenta) 5 MG TABS Take 5 mg by mouth daily   • LORazepam (ATIVAN) 0 5 mg tablet Take 0 5 mg by mouth daily Every day at 8pm   • metFORMIN (GLUCOPHAGE) 1000 MG tablet Take 1 tablet (1,000 mg total) by mouth 2 (two) times a day with meals   • neomycin-bacitracin-polymyxin b (NEOSPORIN) ointment Apply 1 application topically 2 (two) times a day as needed Apply to affected area BID PRN   • nitrofurantoin (MACRODANTIN) 50 mg capsule Take 1 capsule by mouth daily at bedtime   • olopatadine HCl (PATADAY) 0 2 % opth drops Administer 1 drop to both eyes as needed Instill 1 drop into affected eyes daily PRN for eye redness   • Oxybutynin Chloride (DITROPAN XL PO) Take 5 mg by mouth 3 (three) times a day 1 tablet TID   • polyethylene glycol (GLYCOLAX) 17 GM/SCOOP powder DISSOLVE 17GM (1 CAPFUL) IN 8 OZ FLUIDS AND CONSUME BY MOUTH EVERY MORNING *HOLD 1 DAY FOR LOOSE STOOLS* (CONSTIPATION)   • simvastatin (ZOCOR) 20 mg tablet Take 1 tablet (20 mg total) by mouth daily with dinner at 4 pm for hyperlipidemia   • Starch, Thickening, POWD Take by mouth daily Use on food and liquid as directed   • traZODone (DESYREL) 100 mg tablet Take 1 tablet (100 mg total) by mouth daily at  8 pm for depression   • Vitamins A & D (VITAMIN A & D) ointment Apply topically as needed for skin irritation   • denosumab (PROLIA) 60 mg/mL Inject 1 mL (60 mg total) under the skin once for 1 dose   • ferrous sulfate 324 (65 Fe) mg Take 1 tablet by mouth 2 (two) times a day   • omeprazole (PriLOSEC) 20 mg delayed release capsule Take 1 capsule (20 mg total) by mouth 2 (two) times a day before meals   • valACYclovir (VALTREX) 1,000 mg tablet Take 1 tablet (1,000 mg total) by mouth 3 (three) times a day for 7 days       Objective     /72   Pulse 85   Temp 97 8 °F (36 6 °C)   Resp 22   LMP 11/29/2009 (Exact Date)   SpO2 99%     Physical Exam  HENT:      Head: Normocephalic and atraumatic  Right Ear: There is impacted cerumen (minimal)  Left Ear: There is impacted cerumen (moderate)  Ears:      Comments: Left TM not visualized  Cardiovascular:      Rate and Rhythm: Normal rate  Pulmonary:      Effort: Pulmonary effort is normal  No respiratory distress  Breath sounds: No wheezing or rales  Comments: Good air movement  Abdominal:      General: Bowel sounds are normal    Musculoskeletal:      Right lower leg: No edema  Left lower leg: No edema         Mango Rodriguez MD

## 2022-11-17 DIAGNOSIS — B34.9 VIRAL ILLNESS: ICD-10-CM

## 2022-11-18 RX ORDER — GUAIFENESIN 100 MG/5ML
LIQUID ORAL
Qty: 118 ML | Refills: 11 | Status: SHIPPED | OUTPATIENT
Start: 2022-11-18

## 2022-11-29 PROBLEM — Z00.00 MEDICARE ANNUAL WELLNESS VISIT, SUBSEQUENT: Status: RESOLVED | Noted: 2022-09-30 | Resolved: 2022-11-29

## 2022-12-12 ENCOUNTER — TELEPHONE (OUTPATIENT)
Dept: FAMILY MEDICINE CLINIC | Facility: CLINIC | Age: 62
End: 2022-12-12

## 2022-12-12 NOTE — TELEPHONE ENCOUNTER
Folder (To be completed by) -Dr Carli Lowry     Name of Form - Detailed written order     Color folder [facility])    Form to be Faxed (Fax #):[phone]    Patient was made aware of the 7-10 business day form policy

## 2022-12-14 ENCOUNTER — APPOINTMENT (OUTPATIENT)
Dept: LAB | Facility: HOSPITAL | Age: 62
End: 2022-12-14

## 2022-12-14 ENCOUNTER — TELEPHONE (OUTPATIENT)
Dept: NEUROLOGY | Facility: CLINIC | Age: 62
End: 2022-12-14

## 2022-12-14 DIAGNOSIS — M81.0 OSTEOPOROSIS, UNSPECIFIED OSTEOPOROSIS TYPE, UNSPECIFIED PATHOLOGICAL FRACTURE PRESENCE: ICD-10-CM

## 2022-12-14 LAB
ALBUMIN SERPL BCP-MCNC: 3.4 G/DL (ref 3.5–5)
ALP SERPL-CCNC: 53 U/L (ref 46–116)
ALT SERPL W P-5'-P-CCNC: 15 U/L (ref 12–78)
ANION GAP SERPL CALCULATED.3IONS-SCNC: 3 MMOL/L (ref 4–13)
AST SERPL W P-5'-P-CCNC: 16 U/L (ref 5–45)
BILIRUB SERPL-MCNC: 0.22 MG/DL (ref 0.2–1)
BUN SERPL-MCNC: 20 MG/DL (ref 5–25)
CALCIUM ALBUM COR SERPL-MCNC: 11.2 MG/DL (ref 8.3–10.1)
CALCIUM SERPL-MCNC: 10.7 MG/DL (ref 8.3–10.1)
CHLORIDE SERPL-SCNC: 103 MMOL/L (ref 96–108)
CO2 SERPL-SCNC: 34 MMOL/L (ref 21–32)
CREAT SERPL-MCNC: 1.01 MG/DL (ref 0.6–1.3)
GFR SERPL CREATININE-BSD FRML MDRD: 60 ML/MIN/1.73SQ M
GLUCOSE SERPL-MCNC: 149 MG/DL (ref 65–140)
POTASSIUM SERPL-SCNC: 4.9 MMOL/L (ref 3.5–5.3)
PROT SERPL-MCNC: 7.5 G/DL (ref 6.4–8.4)
SODIUM SERPL-SCNC: 140 MMOL/L (ref 135–147)

## 2022-12-15 DIAGNOSIS — M81.0 OSTEOPOROSIS, UNSPECIFIED OSTEOPOROSIS TYPE, UNSPECIFIED PATHOLOGICAL FRACTURE PRESENCE: ICD-10-CM

## 2022-12-15 NOTE — TELEPHONE ENCOUNTER
Caregiver is calling for refill for  Denosumab (Prolia) 60 mg    Will need pre-certification    Swati Plascencia can be reached at [phone]

## 2022-12-19 ENCOUNTER — HOSPITAL ENCOUNTER (OUTPATIENT)
Dept: RADIOLOGY | Age: 62
Discharge: HOME | End: 2022-12-19

## 2022-12-19 VITALS — WEIGHT: 112 LBS | HEIGHT: 58 IN | BODY MASS INDEX: 23.51 KG/M2

## 2022-12-19 DIAGNOSIS — Z12.31 SCREENING MAMMOGRAM FOR BREAST CANCER: ICD-10-CM

## 2022-12-21 ENCOUNTER — TELEPHONE (OUTPATIENT)
Dept: LAB | Facility: HOSPITAL | Age: 62
End: 2022-12-21

## 2022-12-27 ENCOUNTER — TELEPHONE (OUTPATIENT)
Dept: FAMILY MEDICINE CLINIC | Facility: CLINIC | Age: 62
End: 2022-12-27

## 2022-12-27 ENCOUNTER — TELEPHONE (OUTPATIENT)
Dept: NEUROLOGY | Facility: CLINIC | Age: 62
End: 2022-12-27

## 2022-12-27 NOTE — TELEPHONE ENCOUNTER
Folder (To be completed by) -Dr Dunia Archibald     Name of Form - [address] folder (Yellow    Form to be Faxed # [phone]

## 2022-12-27 NOTE — TELEPHONE ENCOUNTER
Yes, Hi  Conchita is Lisa calling from [facility]  I'm calling regarding Ashu Beebe  She has an appointment with Dr Yadi Tang on January 3rd  They sent me a medical consent form for her botox  Unfortunately, they did not fill out the top part of it  And the  that needs to sign this for her be able to have her botox once the top part is filled out  If someone can call me and tell me what to write in, becI need to email that to him and get it back  So that we have it for January 3rd, and Monday of course is a holiday and he won't be available  My number here is [phone]  And this was for Julitaana mariasarah Beebe, [birth year] for Dr Yadi Tang  She has an appointment on January 3rd for botox and I need to take care of this consent  Please if someone can call me back  Thank you   Bychristy

## 2022-12-29 ENCOUNTER — TELEPHONE (OUTPATIENT)
Dept: NEUROLOGY | Facility: CLINIC | Age: 62
End: 2022-12-29

## 2022-12-30 ENCOUNTER — OFFICE VISIT (OUTPATIENT)
Dept: FAMILY MEDICINE CLINIC | Facility: CLINIC | Age: 62
End: 2022-12-30

## 2022-12-30 VITALS
TEMPERATURE: 97.6 F | RESPIRATION RATE: 20 BRPM | DIASTOLIC BLOOD PRESSURE: 71 MMHG | SYSTOLIC BLOOD PRESSURE: 118 MMHG | WEIGHT: 112 LBS | HEART RATE: 89 BPM | BODY MASS INDEX: 23.41 KG/M2

## 2022-12-30 DIAGNOSIS — E11.69 TYPE 2 DIABETES MELLITUS WITH OTHER SPECIFIED COMPLICATION, WITHOUT LONG-TERM CURRENT USE OF INSULIN (HCC): ICD-10-CM

## 2022-12-30 DIAGNOSIS — Z23 FLU VACCINE NEED: ICD-10-CM

## 2022-12-30 DIAGNOSIS — F31.9 BIPOLAR AFFECTIVE DISORDER, REMISSION STATUS UNSPECIFIED (HCC): ICD-10-CM

## 2022-12-30 DIAGNOSIS — Z23 NEED FOR SHINGLES VACCINE: ICD-10-CM

## 2022-12-30 DIAGNOSIS — Z12.31 SCREENING MAMMOGRAM FOR BREAST CANCER: ICD-10-CM

## 2022-12-30 DIAGNOSIS — M81.0 OSTEOPOROSIS, UNSPECIFIED OSTEOPOROSIS TYPE, UNSPECIFIED PATHOLOGICAL FRACTURE PRESENCE: Primary | ICD-10-CM

## 2022-12-30 NOTE — PROGRESS NOTES
Name: Keven Spencer      : 1960      MRN: [id number]  Encounter Provider: Aubree Junior MD  Encounter Date: 2022   Encounter department: [facility]     1  Osteoporosis, unspecified osteoporosis type, unspecified pathological fracture presence  Assessment & Plan:  History of osteoporosis confirmed with a T-score of -3 1 in the right hip on DEXA scan in 2020  Also has history of left hip fracture  Last DEXA scan 2022 shows improvement in bone density   Currently on Calcium 600mg BID and Vitamin D 2000U daily  Also on Prolia   Not on bisphosphonates due to hx of GERD/dysphagia  · Receiving Prolia today - GFR is fine although Calcium is mildly elevated  Still ok to get Prolia but will recheck Calcium with CMP     Orders:  -     denosumab (PROLIA) subcutaneous injection 60 mg    2  Flu vaccine need  -     influenza vaccine, quadrivalent, recombinant, PF, 0 5 mL, for patients 18 yr+ (FLUBLOK)    3  Type 2 diabetes mellitus with other specified complication, without long-term current use of insulin (HCC)  Assessment & Plan:    Lab Results   Component Value Date    HGBA1C 6 8 (H) 2022     Due for A1c, will send with CMP labs in next 2 weeks  Continue current regimen     Orders:  -     HEMOGLOBIN A1C W/ EAG ESTIMATION; Future    4  Screening mammogram for breast cancer  -     Mammo screening bilateral w 3d & cad; Future; Expected date: 2022    5  Bipolar affective disorder, remission status unspecified ([facility] )  Assessment & Plan: Will check Lipid panel, CK, CBC and direct bili due to multiple bipolar meds      Orders:  -     CBC and differential; Future  -     CK (with reflex to MB); Future  -     Bilirubin, direct; Future    6  Need for shingles vaccine           Subjective      62 YOF presenting with her caregiver Paris Knight for Prolia injection  Labs reviewed - GFR is stable but calcium was mildly elevated    She is also on vitamin D and calcium supplementation for her history of osteoporosis and fracture  Patient is also receiving her flu vaccine today and order for Shingrix was placed, which Sorin Frances will  at the pharmacy  Also noted that her fasting glucose and recent CMP a few weeks ago was also elevated at 149  She does have history of diabetes and we will recheck her A1c as well as a CMP for high calcium levels in 2 weeks  Sorin Frances also requested labs for lipid panel, CBC, CK, direct bilirubin due to the patient being on multiple psychiatric medications for bipolar disorder as well as prescription for mammogram so that she may schedule for next year  Filled out physical exam form for the facility -already completed Medicare annual wellness exam a few months ago       Review of Systems   Unable to perform ROS: Patient nonverbal       Current Outpatient Medications on File Prior to Visit   Medication Sig   • acetaminophen (TYLENOL) 160 mg/5 mL liquid Take 10 mL (320 mg total) by mouth every 4 (four) hours as needed for mild pain or fever (Fevers greater than 100 4 F)   • ARIPiprazole (ABILIFY) 2 mg tablet Take 1 tablet by mouth daily at bedtime   • baclofen 10 mg tablet Take 1 tablet (10 mg total) by mouth 3 (three) times a day   • benzonatate (TESSALON PERLES) 100 mg capsule Take 1 capsule (100 mg total) by mouth every 8 (eight) hours as needed for cough   • calcium carbonate (OS-MORRIS) 600 MG tablet Take 1 tablet (600 mg total) by mouth 2 (two) times a day with meals   • carbamide peroxide (DEBROX) 6 5 % otic solution 2 drops 2 drops in the affected ear prn x 5 days   • carbidopa-levodopa (Sinemet)  mg per tablet Take 1 5 tabs TID   • carboxymethylcellulose (REFRESH PLUS) 0 5 % SOLN 1 drop 3 (three) times a day as needed for dry eyes   • citalopram (CeleXA) 40 mg tablet Take 1 tablet by mouth daily   • DIABETIC TUSSIN  MG/5ML oral liquid TAKE 2 TEASPOONSFUL (10ML) BY MOUTH EVERY 4 HOURS AS NEEDED FOR COUGH & CONGESTION   • divalproex sodium (DEPAKOTE SPRINKLE) 125 MG capsule 4 capsules 2 (two) times a day 4 caps BID   • docosanol (ABREVA) 10 % Apply topically 5 (five) times a day Apply and rub in 5x a day until healed as needed for lesion   • fexofenadine (ALLEGRA) 30 MG/5ML suspension Take 30 mL (180 mg total) by mouth daily as needed (seasonal allergies)   • fluticasone (FLONASE) 50 mcg/act nasal spray 1 spray into each nostril daily as needed for rhinitis or allergies   • glimepiride (AMARYL) 1 mg tablet Take 1 tablet (1 mg total) by mouth daily with breakfast   • hydrocortisone 1 % cream Apply topically to affected area twice daily as needed for rash   • levothyroxine (SYNTHROID) 25 mcg tablet Take 1 tablet (25 mcg) by mouth every morning   • linaGLIPtin (Tradjenta) 5 MG TABS Take 5 mg by mouth daily   • LORazepam (ATIVAN) 0 5 mg tablet Take 0 5 mg by mouth daily Every day at 8pm   • metFORMIN (GLUCOPHAGE) 1000 MG tablet Take 1 tablet (1,000 mg total) by mouth 2 (two) times a day with meals   • neomycin-bacitracin-polymyxin b (NEOSPORIN) ointment Apply 1 application topically 2 (two) times a day as needed Apply to affected area BID PRN   • nitrofurantoin (MACRODANTIN) 50 mg capsule Take 1 capsule by mouth daily at bedtime   • olopatadine HCl (PATADAY) 0 2 % opth drops Administer 1 drop to both eyes as needed Instill 1 drop into affected eyes daily PRN for eye redness   • polyethylene glycol (GLYCOLAX) 17 GM/SCOOP powder DISSOLVE 17GM (1 CAPFUL) IN 8 OZ FLUIDS AND CONSUME BY MOUTH EVERY MORNING *HOLD 1 DAY FOR LOOSE STOOLS* (CONSTIPATION)   • simvastatin (ZOCOR) 20 mg tablet Take 1 tablet (20 mg total) by mouth daily with dinner at 4 pm for hyperlipidemia   • Starch, Thickening, POWD Take by mouth daily Use on food and liquid as directed   • traZODone (DESYREL) 100 mg tablet Take 1 tablet (100 mg total) by mouth daily at  8 pm for depression   • Vitamins A & D (VITAMIN A & D) ointment Apply topically as needed for skin irritation • denosumab (PROLIA) 60 mg/mL Inject 1 mL (60 mg total) under the skin once for 1 dose   • ferrous sulfate 324 (65 Fe) mg Take 1 tablet by mouth 2 (two) times a day   • omeprazole (PriLOSEC) 20 mg delayed release capsule Take 1 capsule (20 mg total) by mouth 2 (two) times a day before meals   • Oxybutynin Chloride (DITROPAN XL PO) Take 5 mg by mouth 3 (three) times a day 1 tablet TID   • valACYclovir (VALTREX) 1,000 mg tablet Take 1 tablet (1,000 mg total) by mouth 3 (three) times a day for 7 days       Objective     /71   Pulse 89   Temp 97 6 °F (36 4 °C)   Resp 20   Wt 50 8 kg (112 lb)   LMP 11/29/2009 (Exact Date)   BMI 23 41 kg/m²     Physical Exam  Vitals reviewed  Exam conducted with a chaperone present  Constitutional:       General: She is not in acute distress  Appearance: She is not ill-appearing  HENT:      Head: Normocephalic and atraumatic  Nose: Nose normal    Eyes:      Extraocular Movements: Extraocular movements intact  Conjunctiva/sclera: Conjunctivae normal    Cardiovascular:      Rate and Rhythm: Normal rate and regular rhythm  Heart sounds: No murmur heard  Pulmonary:      Effort: Pulmonary effort is normal  No respiratory distress  Breath sounds: Normal breath sounds  Abdominal:      General: Abdomen is flat  Bowel sounds are normal       Palpations: Abdomen is soft  Musculoskeletal:      Comments: Wheelchair bound   Neurological:      Mental Status: She is alert  Mental status is at baseline         Berkley Beltrán MD

## 2022-12-30 NOTE — ASSESSMENT & PLAN NOTE
Lab Results   Component Value Date    HGBA1C 6 8 (H) 09/14/2022     Due for A1c, will send with CMP labs in next 2 weeks  Continue current regimen

## 2022-12-30 NOTE — ASSESSMENT & PLAN NOTE
History of osteoporosis confirmed with a T-score of -3 1 in the right hip on DEXA scan in 03/2020  Also has history of left hip fracture  Last DEXA scan 9/2022 shows improvement in bone density   Currently on Calcium 600mg BID and Vitamin D 2000U daily  Also on Prolia   Not on bisphosphonates due to hx of GERD/dysphagia  · Receiving Prolia today - GFR is fine although Calcium is mildly elevated   Still ok to get Prolia but will recheck Calcium with CMP

## 2023-01-03 ENCOUNTER — PROCEDURE VISIT (OUTPATIENT)
Dept: NEUROLOGY | Facility: CLINIC | Age: 63
End: 2023-01-03

## 2023-01-03 DIAGNOSIS — G82.50 SPASTIC QUADRIPARESIS (HCC): Primary | ICD-10-CM

## 2023-01-03 DIAGNOSIS — G80.3 DYSTONIC CEREBRAL PALSY (HCC): ICD-10-CM

## 2023-01-03 NOTE — PROGRESS NOTES
Physical Medicine & Rehabilitation Spasticity Follow-up/Procedure  Aaliyah Vaughn 63 y o  female    ASSESSMENT/PLAN:   Shayne Davies was seen today for botulinum toxin injection  Diagnoses and all orders for this visit:    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[facility] )  -     onabotulinumtoxin A (BOTOX) injection 300 Units    Dystonic cerebral palsy ([facility] )      Ms Juan Ramon Sarah is a 67yo F  cerebral palsy with toe walking, bipolar disorder/mood disturbance with impulse control, now with Parkinsonism (possibly secondary in part to psychiatric meds)  Really seems more like dystonic cerebral palsy vs  Mixed pattern CP       At this visit tried to address the muscles that impact her care and function the most  Her L hand intrinsic/lumbrical tightness (I may forego these in the future in favor of targeting her lower extremities) can be an issue, but she is able to open that hand for hygiene and  things  She has multiple bruises due to dystonias and spasms when they transfer in/out of the wheelchair  Biggest issues for her caregivers are accessing perineal region for hygiene, the dystonia in her knee flexors that cause her to bring her legs back whenever they try to go to standing, and her plantarflexors bilaterally resulting in a tiptoe position when she stands  Her L foot also has a tendency to invert when she stands  When seated, she has a striatal toe, ankle is able to be in neutral with dorsiflexion, and she actually tends toward eversion with activation of her fibularis muscles  But this is less of a problem for her foot positioning in her wheelchair  She has not developed any wounds due to her striatal toe  I chose not to inject these muscles at this time  I suspect I may need 400 units at her next visit given that dystonias often require higher dosing for effect  Today, due to the amount of botulinum toxin I had, I focused on her hip adductors and ankle positioning   In the future, I may want to address hip adductors, knee flexors, and ankle positioning  She can wear her MAFOs on PT days  She should resume PT at this point  Her skin should be monitored when donning/doffing MAFOs due to her dystonia and risk for skin breakdown  She is getting a new wheelchair soon with foot straps that is customized to her which is appropriate       Consent needs to be signed by her  who is her POA  ? PLAN:  1  Oral spasticity medications: For now continue Baclofen 10mg TID  May want to titrate this up in the future as tolerated  2  Schedule likely 400 units at her next visit  3  Restart therapy at her facility to work on standing, range of motion, stretching  4  Will plan to have her return in 1 month with her caregiver Monika Bullard for evaluation at its peak  5  Can route note to Dr Jomar Alvarez  ?  *I have spent 40 minutes with caregiver Monika Bullard today in which greater than 50% of this time was spent in counseling/coordination of care regarding Risks and benefits of tx options, Intructions for management, Patient and family education and Impressions  HPI/SUBJECTIVE:  Patient presents today in the office with chief complaint of dystonic movements that cause injuries to her legs during transfers and impact caregiving  Difficulty with doing hygiene due to adductor dystonia, difficulty going from sit to stand as her knees flex back as soon as they go to stand her, and she goes into plantarflexion as soon as she is standing  She also has some mild lumbrical tightness in her L hand, her caregiver is wondering if it would help to address that to allow her to move that hand more easily for functional activities  Last seen for chemodenervation: Never  Results of chemodenervation: N/A  How long did effects last: N/A  Side affect/Adverse Effects: N/A     Any issues with driving, swallowing, appetite:  Does not drive, medical transport  Long term issues with swallowing on altered diet which is stable   Appetite is stable  Stretching/Exercise Program: PROM on a daily basis  They are trying to do more stand pivot transfers to help with her stretching in her LE  Currently in therapy: No  Any falls with significant injuries: No  Any new weakness: No  Any changes to care since last seen: None  Safe at home: Yes  Any oral meds: Very sensitive  Currently tolerates Baclofen 10mg TID  On anticoagulation/blood thinners: None  Current functional status:  MaxA with all function  ADLs requiring up to 2 person assist    Primary wheelchair mobility  Trouble standing    ROS: A 10 point review of systems was negative except for what is noted in the HPI  Imaging: I have personally reviewed imaging with results as follows: No new imaging since last seen  OBJECTIVE:   BP (P) 140/86 (BP Location: Left arm, Patient Position: Sitting)   Pulse (P) 86   Temp (P) 98 9 °F (37 2 °C) (Temporal)   Ht (P) 4' 10" (1 473 m)   Wt (P) 50 8 kg (112 lb)   LMP 11/29/2009 (Exact Date)   BMI (P) 23 41 kg/m²     Gen: No acute distress, Well-nourished, well-appearing  HEENT: Moist mucus membranes, Normocephalic/Atraumatic  Cardiovascular: Regular rate, rhythm, S1/S2  Distal pulses palpable  Heme/Extr: No edema  Pulmonary: Non-labored breathing  Lungs CTAB  GI: Soft, non-tender, non-distended  Integumentary: Skin is warm, dry  No rashes or ulcers  Psych: Normal mood and affect  Neuro/MSK: Awake, alert  Aphasic minimally verbal but said "Hi" which is better than her last visit  She has  Dystonic posturing in her ankles, hips, and knees  Able to get to neutral  Everts when seated, and L foot inverts in standing and plantarflexes  R does not invert as much as it plantarflexes when going to stand  As they try to stand her, her knees kick immediately back into flexion  Unable to participate in MMT  Has some mild intrinsic tone in her L lumbricals (primarly 2nd, 3rd, 4th)  But most of it is actually dystonic       She has tardive dyskinesia and oromotor dystonia  She has UE dystonias as well which are the same as previously described  Botulinum Toxin Injection Procedure    Pre-operative diagnosis: Spasticity    Post-operative diagnosis: Same    Procedure: Chemodenervation      After risks and benefits were explained including bleeding, infection, worsening of pain, damage to the areas being injected, weakness of muscles, loss of muscle control, dysphagia if injecting the head or neck, facial droop if injecting the facial area, painful injection, allergic or other reaction to the medications being injected, and the failure of the procedure to help the problem, a signed consent was obtained on 1/4/2023     The patient was placed in a seated position and the sites to be treated were identified  A time out was called and performed  The area to be treated was prepped three times with alcohol and the alcohol allowed to dry  Next, a 26 gauge, 50mm electrode needle needle was used to inject the medication in the area to be treated  Guidance: EMG  Patient position: seated  Area(s) injected:    Muscle Dose (units) # Sites Technique   L Lumbricals (2/3/4) 10 x3  3 EMG   L medial gastroc 50  2 EMG   L tibialis posterior 30  2 EMG   L hip adductors 50  3 EMG   R soleus 30  2 EMG   R medial gastroc 50  2 EMG   R hip adductors 50  2 EMG       EMG         EMG         EMG         EMG    Waste 10   EMG         EMG        Medications used: 300 units of botox diluted in 3 mL of preservative free saline    Botox Lot/Exp: [id number] x3 ; Exp 04/2024 x3    The patient did tolerate the procedure well  There were no complications      The following portions of the patient's history were reviewed and updated as appropriate: allergies, current medications, past family history, past medical history, past social history, past surgical history and problem list       Current Outpatient Medications:   •  acetaminophen (TYLENOL) 160 mg/5 mL liquid, Take 10 mL (320 mg total) by mouth every 4 (four) hours as needed for mild pain or fever (Fevers greater than 100 4 F), Disp: 236 mL, Rfl: 3  •  ARIPiprazole (ABILIFY) 2 mg tablet, Take 1 tablet by mouth daily at bedtime, Disp: , Rfl:   •  baclofen 10 mg tablet, Take 1 tablet (10 mg total) by mouth 3 (three) times a day, Disp: 90 tablet, Rfl: 11  •  benzonatate (TESSALON PERLES) 100 mg capsule, Take 1 capsule (100 mg total) by mouth every 8 (eight) hours as needed for cough, Disp: 30 capsule, Rfl: 0  •  calcium carbonate (OS-MORRIS) 600 MG tablet, Take 1 tablet (600 mg total) by mouth 2 (two) times a day with meals, Disp: 60 tablet, Rfl: 2  •  carbamide peroxide (DEBROX) 6 5 % otic solution, 2 drops 2 drops in the affected ear prn x 5 days, Disp: , Rfl:   •  carbidopa-levodopa (Sinemet)  mg per tablet, Take 1 5 tabs TID, Disp: 405 tablet, Rfl: 1  •  carboxymethylcellulose (REFRESH PLUS) 0 5 % SOLN, 1 drop 3 (three) times a day as needed for dry eyes, Disp: , Rfl:   •  citalopram (CeleXA) 40 mg tablet, Take 1 tablet by mouth daily, Disp: , Rfl:   •  denosumab (PROLIA) 60 mg/mL, Inject 1 mL (60 mg total) under the skin once for 1 dose, Disp: 1 mL, Rfl: 1  •  DIABETIC TUSSIN  MG/5ML oral liquid, TAKE 2 TEASPOONSFUL (10ML) BY MOUTH EVERY 4 HOURS AS NEEDED FOR COUGH & CONGESTION, Disp: 118 mL, Rfl: 11  •  divalproex sodium (DEPAKOTE SPRINKLE) 125 MG capsule, 4 capsules 2 (two) times a day 4 caps BID, Disp: , Rfl:   •  docosanol (ABREVA) 10 %, Apply topically 5 (five) times a day Apply and rub in 5x a day until healed as needed for lesion, Disp: , Rfl: 0  •  ferrous sulfate 324 (65 Fe) mg, Take 1 tablet by mouth 2 (two) times a day, Disp: , Rfl:   •  fexofenadine (ALLEGRA) 30 MG/5ML suspension, Take 30 mL (180 mg total) by mouth daily as needed (seasonal allergies), Disp: 237 mL, Rfl: 3  •  fluticasone (FLONASE) 50 mcg/act nasal spray, 1 spray into each nostril daily as needed for rhinitis or allergies, Disp: 1 Bottle, Rfl: 1  •  glimepiride (AMARYL) 1 mg tablet, Take 1 tablet (1 mg total) by mouth daily with breakfast, Disp: 90 tablet, Rfl: 3  •  hydrocortisone 1 % cream, Apply topically to affected area twice daily as needed for rash, Disp: 30 g, Rfl: 0  •  levothyroxine (SYNTHROID) 25 mcg tablet, Take 1 tablet (25 mcg) by mouth every morning, Disp: , Rfl: 0  •  linaGLIPtin (Tradjenta) 5 MG TABS, Take 5 mg by mouth daily, Disp: , Rfl:   •  LORazepam (ATIVAN) 0 5 mg tablet, Take 0 5 mg by mouth daily Every day at 8pm, Disp: , Rfl:   •  metFORMIN (GLUCOPHAGE) 1000 MG tablet, Take 1 tablet (1,000 mg total) by mouth 2 (two) times a day with meals, Disp: 60 tablet, Rfl: 2  •  neomycin-bacitracin-polymyxin b (NEOSPORIN) ointment, Apply 1 application topically 2 (two) times a day as needed Apply to affected area BID PRN, Disp: , Rfl:   •  nitrofurantoin (MACRODANTIN) 50 mg capsule, Take 1 capsule by mouth daily at bedtime, Disp: , Rfl:   •  olopatadine HCl (PATADAY) 0 2 % opth drops, Administer 1 drop to both eyes as needed Instill 1 drop into affected eyes daily PRN for eye redness, Disp: , Rfl:   •  omeprazole (PriLOSEC) 20 mg delayed release capsule, Take 1 capsule (20 mg total) by mouth 2 (two) times a day before meals, Disp: 60 capsule, Rfl: 2  •  Oxybutynin Chloride (DITROPAN XL PO), Take 5 mg by mouth 3 (three) times a day 1 tablet TID, Disp: , Rfl:   •  polyethylene glycol (GLYCOLAX) 17 GM/SCOOP powder, DISSOLVE 17GM (1 CAPFUL) IN 8 OZ FLUIDS AND CONSUME BY MOUTH EVERY MORNING *HOLD 1 DAY FOR LOOSE STOOLS* (CONSTIPATION), Disp: 510 g, Rfl: 10  •  simvastatin (ZOCOR) 20 mg tablet, Take 1 tablet (20 mg total) by mouth daily with dinner at 4 pm for hyperlipidemia, Disp: , Rfl:   •  Starch, Thickening, POWD, Take by mouth daily Use on food and liquid as directed, Disp: 1020 g, Rfl: 3  •  traZODone (DESYREL) 100 mg tablet, Take 1 tablet (100 mg total) by mouth daily at  8 pm for depression, Disp: 90 tablet, Rfl: 0  •  valACYclovir (VALTREX) 1,000 mg tablet, Take 1 tablet (1,000 mg total) by mouth 3 (three) times a day for 7 days, Disp: 21 tablet, Rfl: 0  •  Vitamins A & D (VITAMIN A & D) ointment, Apply topically as needed for skin irritation, Disp: , Rfl:     Past Surgical History:   Procedure Laterality Date   • ABDOMINAL SURGERY     • COLECTOMY MIN      re-anastomosis 7/22/16   • COLONOSCOPY N/A 7/21/2016    Procedure: COLONOSCOPY;  Surgeon: Lisa Fry MD;  Location: [facility]; Service:    • COLONOSCOPY N/A 1/31/2016    Procedure: COLONOSCOPY;  Surgeon: Lisa Fry MD;  [facility];  Service:    • COLONOSCOPY N/A 7/25/2017    Procedure: COLONOSCOPY;  Surgeon: Lisa Fry MD;  Location: [facility];   Service: Colorectal   • COLOSTOMY     • EXPLORATORY LAPAROTOMY W/ BOWEL RESECTION N/A 1/31/2016    Procedure: exploratory laparotomy, left sigmoidectomy, coloproctostomy, take down splenic flexure, loop colostomy;  Surgeon: Lisa Fry MD;  Location: [facility];  Service:    • CT CLOSURE ENTEROSTOMY LG/SMALL INTESTINE N/A 7/22/2016    Procedure: SEGMENTAL COLECTOMY WITH COLOCOLOSTOMY;  Surgeon: Lisa Fry MD;  Location: [facility];  Service: Colorectal   • UPPER GASTROINTESTINAL ENDOSCOPY         Patient Active Problem List    Diagnosis Date Noted   • COVID-19 virus infection 11/07/2022   • Viral illness 11/04/2022   • Encounter for annual routine gynecological examination 10/07/2022   • Dystonia 04/19/2022   • Cervical dystonia 04/19/2022   • Parkinsonism ([facility] ) 01/21/2022   • 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[facility] ) 10/18/2021   • Polyneuropathy associated with underlying disease ([facility] ) 10/18/2021   • Herpes zoster without complication 03/00/4125   • Abnormal finding on lung imaging 04/06/2021   • Exposure to COVID-19 virus 01/12/2021   • Macrocytic anemia 35/98/1041   • Eosinophilic leukocytosis 54/70/5283   • Underweight 09/03/2020   • Dysphagia 08/01/2020   • Acute cystitis without hematuria 07/26/2020   • Decreased appetite 07/26/2020   • History of vitamin D deficiency 01/22/2020   • History of fall 10/28/2019   • Abdominal bloating 09/26/2019   • Breast asymmetry 07/09/2019   • Hypernatremia 12/28/2018   • Severe sepsis ([facility] ) 12/17/2018   • Gait disturbance 06/26/2018   • Osteoarthritis of both hips 06/08/2018   • Severe Intellectual disability 04/19/2018   • Type 2 diabetes mellitus, without long-term current use of insulin ([facility] ) 04/19/2018   • Abnormal albumin 09/18/2017   • Peripheral neuropathy 09/18/2017   • Seasonal allergies 06/29/2017   • Physical deconditioning 05/09/2017   • Hyperlipidemia 03/27/2017   • Gastroesophageal reflux disease 03/27/2017   • Cerebral palsy ([facility] ) 03/27/2017   • Spasticity 03/27/2017   • Ambulatory dysfunction 03/27/2017   • Recurrent UTI (urinary tract infection) 03/27/2017   • Bipolar disorder ([facility] ) 03/27/2017   • Hypotension 04/11/2016   • 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[facility] ) 02/01/2016   • Osteoporosis 01/19/2016   • Resting tremor 01/19/2016   • Toxic metabolic encephalopathy 01/43/4427   • Gait abnormality 12/11/2015   • Other chronic pain 07/30/2015   • Anxiety 07/15/2015   • Menopause 09/25/2014   • Chondrodermatitis nodularis helicis of left ear 29/44/5929   • Atopic dermatitis 06/02/2014   • Dry eye 06/02/2014   • Constipation 04/12/2013   • Iron deficiency anemia 03/27/2013   • Urinary incontinence 03/27/2013   • Hypothyroidism 01/10/2013

## 2023-01-03 NOTE — TELEPHONE ENCOUNTER
Form has been completed by Dr Etta Alvarez and placed in yellow folder in clerical area to be scanned into patient's chart and faxed accordingly

## 2023-01-03 NOTE — PATIENT INSTRUCTIONS
You have received botulinum toxin injections today  The skin around the site of injections should be monitored for a couple of days  If redness or swelling occur, the skin should be examined by a health care professional to rule out infection  You can call my office if this occurs  Please contact our office via [address] in 2 weeks to report on the effects of the injections or any time with any questions or concerns  Please note that your next injections should not be scheduled less than 90 days from today  If your health insurance changes before the next injections, please contact our office as soon as possible so we can submit for a new prior authorization if needed  Please note that we may not be able to perform the injections if your insurance changes and the treatment is not pre-authorized

## 2023-01-18 ENCOUNTER — APPOINTMENT (OUTPATIENT)
Dept: LAB | Facility: HOSPITAL | Age: 63
End: 2023-01-18

## 2023-01-18 DIAGNOSIS — M81.0 OSTEOPOROSIS, UNSPECIFIED OSTEOPOROSIS TYPE, UNSPECIFIED PATHOLOGICAL FRACTURE PRESENCE: ICD-10-CM

## 2023-01-18 DIAGNOSIS — E11.69 TYPE 2 DIABETES MELLITUS WITH OTHER SPECIFIED COMPLICATION, WITHOUT LONG-TERM CURRENT USE OF INSULIN (HCC): ICD-10-CM

## 2023-01-18 DIAGNOSIS — F31.9 BIPOLAR AFFECTIVE DISORDER, REMISSION STATUS UNSPECIFIED (HCC): ICD-10-CM

## 2023-01-18 LAB
EST. AVERAGE GLUCOSE BLD GHB EST-MCNC: 148 MG/DL
HBA1C MFR BLD: 6.8 %

## 2023-01-19 ENCOUNTER — OFFICE VISIT (OUTPATIENT)
Dept: NEUROLOGY | Facility: CLINIC | Age: 63
End: 2023-01-19

## 2023-01-19 VITALS
RESPIRATION RATE: 16 BRPM | SYSTOLIC BLOOD PRESSURE: 110 MMHG | TEMPERATURE: 98.2 F | HEART RATE: 75 BPM | OXYGEN SATURATION: 100 % | DIASTOLIC BLOOD PRESSURE: 60 MMHG

## 2023-01-19 DIAGNOSIS — G80.3 DYSTONIC CEREBRAL PALSY (HCC): Primary | ICD-10-CM

## 2023-01-19 DIAGNOSIS — F39 MOOD DISORDER (HCC): ICD-10-CM

## 2023-01-19 DIAGNOSIS — G20 PARKINSON'S DISEASE (HCC): ICD-10-CM

## 2023-01-19 RX ORDER — CHOLECALCIFEROL (VITAMIN D3) 125 MCG
CAPSULE ORAL
COMMUNITY
Start: 2023-01-18

## 2023-01-19 RX ORDER — ONABOTULINUMTOXINA 100 [USP'U]/1
INJECTION, POWDER, LYOPHILIZED, FOR SOLUTION INTRADERMAL; INTRAMUSCULAR
COMMUNITY
Start: 2022-10-20

## 2023-01-19 RX ORDER — CARBOXYMETHYLCELLULOSE SODIUM 10 MG/ML
GEL OPHTHALMIC
COMMUNITY
Start: 2023-01-09 | End: 2023-01-19 | Stop reason: CLARIF

## 2023-01-19 RX ORDER — OXYBUTYNIN CHLORIDE 5 MG/1
TABLET ORAL
COMMUNITY
Start: 2023-01-18 | End: 2023-01-19 | Stop reason: CLARIF

## 2023-01-19 NOTE — PROGRESS NOTES
[facility] -   Follow up appointment    Impression/Plan    Janelle Alcaraz is a 63 y o  female with a PMH of intellectual disability 2/2 cerebral palsy, diabetes, and dysphagia presenting for follow up  Her tremor has been stable since last seen  Her cognition is still getting slowly better over time  Her neurological exam is largely comparable to previous appointments  She will continue to get botox therapy and home PT once that is optimized  Plan outlined below:    #Parkinsonism  Abilify 2 mg daily  - sinemet 25/100 mg 1 5 tabs TID      #Dystonia  - f/u PMR for botox  - baclofen 10 mg TID    #Mood disorder  - Depakote 500 BID: POA wondering if can be decreased in the future  I was open to the idea since unclear if she ever had clinical seizures  - Follow up in 4 months with Annabelle Moeller and all orders for this visit:    Dystonic cerebral palsy ([facility] [address] )    Parkinson's disease ([facility] [address] )    Mood disorder ([facility] [address] )    Other orders  -     Discontinue: oxybutynin (DITROPAN) 5 mg tablet  -     Cholecalciferol (Vitamin D3) 50 MCG (2000 UT) TABS  -     Discontinue: Refresh Liquigel 1 % GEL  -     Botox 100 units      Subjective    Janelle Alcaraz is a 63 y o  female with a PMH of intellectual disability 2/2 cerebral palsy, diabetes, and dysphagia presenting for follow up  For review:     She has followed with our epilepsy team since 2016 for history of seizure like episodes   She was seen 10/2021 in which her largest concern was increased tremors, and gait changes, possible "freezing"  Her exam did showed significant spasticity  Symptoms worsening over the past few months  She was started on a trial of sinemet, she was referred to PM&R due to spasticity       At the office visit on 3/2021 it was reported that her symptoms had improved with sinemet  The plan was to increase to dose  Also the plan was for psych to continue to decrease abilify   She did see PM&R for spasticity but felt was more so dystonia-did discuss botox injections given patient has sedation side effects from medications  She was seen on 4/22/2022 the was again an improvement in movement and facial expression with the higher dose on sinemet  Abilify wasn't d/c'd yet  Prior to august 2021 she was walking with walker 1-2 assist  At that appointment she does ambulate 5-10 feet with holding onto staff  The patient was last seen in clinic on 8/26/2022  Psych did reduce the dose of abilify further  Staff noted that she is more alert and can  a fork for the first time in a year  She requires a adela lift for transfers  She didn't get botox approved but was working with PMR for other options  Plan at that time was to continue with current medications  Interval history: It appears that botox was approved and she can meet with Dr Rod Smith for dosing on 1/3/2023  Since last seen, the patient has been doing well  There have been no seizures since the last appointment  The patients AEDs were being tolerated well with no side effects  There is no concern for medication non-adherence  Current Medication:  abilify 2 mg QD  Sinemet 25/100 mg 1 tab TID  Baclofen 10 mg TID    History Reviewed: The following were reviewed and updated as appropriate: allergies, current medications, past family history, past medical history, past social history, past surgical history and problem list    ROS:  Constitutional: Negative  Negative for appetite change and fever  HENT: Negative  Negative for hearing loss, tinnitus, trouble swallowing and voice change  Eyes: Negative  Negative for photophobia, pain and visual disturbance  Respiratory: Negative  Negative for shortness of breath  Cardiovascular: Negative  Negative for palpitations  Gastrointestinal: Negative  Negative for nausea and vomiting  Endocrine: Negative  Negative for cold intolerance  Genitourinary: Negative  Negative for dysuria, frequency and urgency  Musculoskeletal: Negative  Negative for gait problem, myalgias and neck pain  Skin: Negative  Negative for rash  Allergic/Immunologic: Negative  Neurological: Positive for tremors  Negative for dizziness, seizures, syncope, facial asymmetry, speech difficulty, weakness, light-headedness, numbness and headaches  Hematological: Negative  Does not bruise/bleed easily  Psychiatric/Behavioral: Negative  Negative for confusion, hallucinations and sleep disturbance  Objective    /60 (BP Location: Left arm, Patient Position: Sitting, Cuff Size: Standard)   Pulse 75   Temp 98 2 °F (36 8 °C) (Temporal)   Resp 16   LMP 11/29/2009 (Exact Date)   SpO2 100%      General Exam  General: well developed, no acute distress  HEENT: mucous membranes moist, anicteric sclera  Neck: supple, good ROM  Extremities: no clubbing, cyanosis or edema  Skin: no rash on visible skin  Neurological Exam  Mental Status  Awake and alert  Speech: nonverbal  Follows one-step commands      Cranial Nerves  CN III, IV, VI: Extraocular movements intact bilaterally  Normal lids and orbits bilaterally  Pupils equal round and reactive to light bilaterally  CN V: Facial sensation is normal   CN VII: Full and symmetric facial movement  CN VIII: Hearing is normal   CN XI: Shoulder shrug strength is normal   CN XII: Tongue midline without atrophy or fasciculations      Motor     Moves all extremities antigravity normal  strength b/l, intermittently follows commands so had difficulty assessing full strength but appears equal      Sensory  Light touch is normal in upper and lower extremities       Reflexes  Deep tendon reflexes are 2+ and symmetric in all four extremities      Coordination     Occasional resting tremor in left hand, no postural tremor,  Unable to perform CARLYLE but overall mild bradykinesia  She did have spasticity in the UE along with toritcollis   I did not visualize any abnormal posturing of the right wrist today as previous  Her left wrist was contracture with no change in positioning/posture  She did have involuntary eversion and posturing of b/l feet that appears dystonic, stiral posturing of the toes in b/l feet intermittently        Gait     Patient presented in wheel chair, not ambulated due to safety concerns    Sarai Villagran MD   [facility]  [facility] [address] [facility]

## 2023-01-19 NOTE — PROGRESS NOTES
Review of Systems   Constitutional: Negative  Negative for appetite change and fever  HENT: Negative  Negative for hearing loss, tinnitus, trouble swallowing and voice change  Eyes: Negative  Negative for photophobia, pain and visual disturbance  Respiratory: Negative  Negative for shortness of breath  Cardiovascular: Negative  Negative for palpitations  Gastrointestinal: Negative  Negative for nausea and vomiting  Endocrine: Negative  Negative for cold intolerance  Genitourinary: Negative  Negative for dysuria, frequency and urgency  Musculoskeletal: Negative  Negative for gait problem, myalgias and neck pain  Skin: Negative  Negative for rash  Allergic/Immunologic: Negative  Neurological: Positive for tremors  Negative for dizziness, seizures, syncope, facial asymmetry, speech difficulty, weakness, light-headedness, numbness and headaches  Hematological: Negative  Does not bruise/bleed easily  Psychiatric/Behavioral: Negative  Negative for confusion, hallucinations and sleep disturbance

## 2023-01-24 ENCOUNTER — TELEPHONE (OUTPATIENT)
Dept: NEUROLOGY | Facility: CLINIC | Age: 63
End: 2023-01-24

## 2023-01-24 NOTE — TELEPHONE ENCOUNTER
MSW reviewed fax - form is from [facility] [place] requesting signed chart notes addressing the patient's condition and diagnosis related to the need for a manual wheelchair  MSW reviewed office note from 8/26/22  Staff that accompanied patient to her appt that day requested a referral to the [facility]  MSW will review available notes with Lizzy Hoffmann at [facility] to see if same can be accepted or if a face to face will need to be completed

## 2023-01-24 NOTE — TELEPHONE ENCOUNTER
Fax sent to [employer]   Per Joshua Machado, the fax and task is to be taken care of by our [address]  work team   Fax scanned into pt chart   Please advise

## 2023-01-25 NOTE — TELEPHONE ENCOUNTER
MSW faxed patient's demographics, ins card copies, and 8/26 neuro notes to [facility] at [facility] at [phone]  Successful fax notice received  MSW followed-up with [facility] by phone at [phone], ext [phone]  [facility] confirmed that she receive the fax and stated that the documentation was sufficient for a manual wheelchair  [facility] stated that she forwarded the paperwork to be processed and that Moise Sanford, will be receiving a fax likely by Monday - she will need to sign off on the letter of medical necessity and the prescription  [facility] stated that the on the fax will be an link that Gilbert Wileys can type in and the documents will be there for her to e-sign  MSW will alert Annabelle to be alert for this fax

## 2023-01-26 ENCOUNTER — TELEPHONE (OUTPATIENT)
Dept: NEUROLOGY | Facility: CLINIC | Age: 63
End: 2023-01-26

## 2023-01-26 NOTE — TELEPHONE ENCOUNTER
----- Message from Tyler Hurst MD sent at 1/4/2023  5:08 PM EST -----  Hi Echo,    Can we get this note and the referral to ambulatory physical therapy faxed to her facility  Gregorio Tracey was the caregiver with her today  I suspect at her next visit will need 400 units to address her knee flexors      Thank you,  Yossi Ross MD  Physical Medicine and Rehabilitation

## 2023-01-27 NOTE — TELEPHONE ENCOUNTER
MSW phoned [address]  at [facility] at [phone], ext [phone]  She confirmed receipt of the signed document and stated that it is already with their insurance team  [id number]  stated that once insurance approval is obtained, the item will be ordered, then delivered  [id number]  stated that the process should take about 1-1 5 months  MSW will reach out to [id number]  for an update in about 1 month

## 2023-02-01 ENCOUNTER — OFFICE VISIT (OUTPATIENT)
Dept: NEUROLOGY | Facility: CLINIC | Age: 63
End: 2023-02-01

## 2023-02-01 VITALS
HEART RATE: 86 BPM | BODY MASS INDEX: 23.51 KG/M2 | TEMPERATURE: 97.6 F | WEIGHT: 112 LBS | SYSTOLIC BLOOD PRESSURE: 113 MMHG | HEIGHT: 58 IN | DIASTOLIC BLOOD PRESSURE: 59 MMHG

## 2023-02-01 DIAGNOSIS — G80.3 DYSTONIC CEREBRAL PALSY (HCC): Primary | ICD-10-CM

## 2023-02-01 NOTE — PROGRESS NOTES
Physical Medicine & Rehabilitation Spasticity Follow-up/Procedure  Raul Dixon 63 y o  female    ASSESSMENT/PLAN:   Diagnoses and all orders for this visit:    Dystonic cerebral palsy [facility])      Ms Luz Maria Haji is a 67yo F  cerebral palsy with toe walking, bipolar disorder/mood disturbance with impulse control, now with Parkinsonism (possibly secondary in part to psychiatric meds)  Really seems more like dystonic cerebral palsy vs  Mixed pattern CP  She had minimal response to the botulinum toxin, and still tends toward dystonic posturing that affects her care and her transfers  After further discussion with her caregiver, we decided to focus on her legs and increase the amount of botulinum toxin we use  Dystonias do typically seem to need higher doses  I would address:  L medial gastroc 75 units - to help with the plantarflexion posturing  L semitendinosus/membranosus - to help with knee flexion and hip internal rotation  75 units  L Hip adductor - 75 units  R medial gastroc 75 units   R semitendinosus/membranosus - 75 units  R hip adductor - 75 units  I will not address her tibialis posterior, lumbricals so I can concentrate on the higher yield muscles  Will request 500 units of botulinum toxin for her next visit      Muna Bowles discussed therapies, I would hold off, since she had minimal response to this round of botulinum toxin  Potentially could start it after her next round  She only gets total 6 weeks of home therapies a year, so want to time it appropriately  [facility] would be to [facility] [place] and would need an ambulatory home health referral      Consent needs to be signed by her  who is her POA  ? PLAN:  1  Oral spasticity medications: For now continue Baclofen 10mg TID  May want to titrate this up in the future as tolerated    2  Schedule likely 500 units at her next visit  3  Continue to work on standing, range of motion, stretching     4  Will plan to have her return on April 11th at 1:45pm for repeat botulinum toxin injections  ?  *I have spent 30 minutes with Caregiver today in which greater than 50% of this time was spent in counseling/coordination of care regarding Risks and benefits of tx options, Patient and family education and Impressions  [facility]  HPI/SUBJECTIVE:  Patient presents today in the office with chief complaint of with continued issues with hip adduction, and dystonia with foot posturing that affect her transfers and her position in the wheelchair  She still walks on her toes when they do have her stand  Her hips continue to adduct  Not much change to the L hand, but that is of secondary importance for her and her team   Her function tends to be quite fluctuant  Last seen for chemodenervation: 1/3/2023  Results of chemodenervation: They're not sure they've noticed much improvement  How long did effects last: N/A  Side affect/Adverse Effects: None    Any issues with driving, swallowing, appetite:  Does not drive, medical transport  Long term issues with swallowing on dysphagia diet which is stable  Appetite is stable  Stretching/Exercise Program: PROM on a daily basis  Currently in therapy: No  Any falls with significant injuries: No falls  Any new weakness: No new weakness  Any changes to care since last seen: No  Safe at home: Yes  Any oral meds: Very sensitive - currently tolerating Baclofen 10mg TID  On anticoagulation/blood thinners: None  Current functional status:  MaxA with all function  ADLs require up to 2 person assist  Primary wheelchair mobility  Transfers with assist - trying to do more stand pivot  ROS: A 10 point review of systems was negative except for what is noted in the HPI  Imaging: I have personally reviewed imaging with results as follows: No new imaging since last seen      OBJECTIVE:   /59 (BP Location: Left arm, Patient Position: Sitting)   Pulse 86   Temp 97 6 °F (36 4 °C) (Temporal)   Ht 4' 10" (1 473 m)   Wt 50 8 kg (112 lb)   LMP 11/29/2009 (Exact Date)   BMI 23 41 kg/m²     Gen: No acute distress  HEENT: Moist mucus membranes, Normocephalic/Atraumatic  Cardiovascular: Regular, Distal pulses palpable  Heme/Extr: No edema  Pulmonary: Non-labored breathing  GI: Soft, non-tender, non-distended  MSK: Continues with dystonic posturing  See Neuro below  Whne going to standing less knee flexion, she tends to turn in at the hip, some femoral anteversion, knees straight, still in quite a bit of plantarflexion, able to get almost neutral when eated  Integumentary: Skin is warm, dry  Psych: Normal mood and affect  Neuro: Awake, alert  Unable to get full exam due to cognitive linguistic difficulties  Continues with dystonic posturing  When seated, ankles can be at neutral and tend toward eversion  L hand with tight lumbricals, but easily able to access palm  She sometimes locks out in her quads when seated and when standing  She sometimes flexes her knees back as well  She tends to rotate internally in the hip when seated and standing  Adduction was not as bad at this visit with MAS of 2 or so  When they go to stand, she is less flexed at the knee  She remains plantarflexed on standing in both legs, although the R seemed to be slightly less plantarflexed at this visit - her caregiver reports this is inconsistent  She requires total A       MAS  0 - no increased tone  1 - slight increase in tone at the end of the ROM  1+ increase in tone at 1/2 the ROM  2 - increase in tone through most the ROM  3 - moderate increase in muscle tone - passive movement difficult  4 - affected parts ridid in flexion or extension      The following portions of the patient's history were reviewed and updated as appropriate: allergies, current medications, past family history, past medical history, past social history, past surgical history and problem list       Current Outpatient Medications:   •  acetaminophen (TYLENOL) 160 mg/5 mL liquid, Take 10 mL (320 mg total) by mouth every 4 (four) hours as needed for mild pain or fever (Fevers greater than 100 4 F), Disp: 236 mL, Rfl: 3  •  ARIPiprazole (ABILIFY) 2 mg tablet, Take 1 tablet by mouth daily at bedtime, Disp: , Rfl:   •  baclofen 10 mg tablet, Take 1 tablet (10 mg total) by mouth 3 (three) times a day, Disp: 90 tablet, Rfl: 11  •  benzonatate (TESSALON PERLES) 100 mg capsule, Take 1 capsule (100 mg total) by mouth every 8 (eight) hours as needed for cough, Disp: 30 capsule, Rfl: 0  •  Botox 100 units, , Disp: , Rfl:   •  calcium carbonate (OS-MORRIS) 600 MG tablet, Take 1 tablet (600 mg total) by mouth 2 (two) times a day with meals, Disp: 60 tablet, Rfl: 2  •  carbamide peroxide (DEBROX) 6 5 % otic solution, 2 drops 2 drops in the affected ear prn x 5 days, Disp: , Rfl:   •  carbidopa-levodopa (Sinemet)  mg per tablet, Take 1 5 tabs TID, Disp: 405 tablet, Rfl: 1  •  carboxymethylcellulose (REFRESH PLUS) 0 5 % SOLN, 1 drop 3 (three) times a day as needed for dry eyes, Disp: , Rfl:   •  Cholecalciferol (Vitamin D3) 50 MCG (2000 UT) TABS, , Disp: , Rfl:   •  citalopram (CeleXA) 40 mg tablet, Take 1 tablet by mouth daily, Disp: , Rfl:   •  denosumab (PROLIA) 60 mg/mL, Inject 1 mL (60 mg total) under the skin once for 1 dose, Disp: 1 mL, Rfl: 1  •  DIABETIC TUSSIN  MG/5ML oral liquid, TAKE 2 TEASPOONSFUL (10ML) BY MOUTH EVERY 4 HOURS AS NEEDED FOR COUGH & CONGESTION, Disp: 118 mL, Rfl: 11  •  divalproex sodium (DEPAKOTE SPRINKLE) 125 MG capsule, 4 capsules 2 (two) times a day 4 caps BID, Disp: , Rfl:   •  docosanol (ABREVA) 10 %, Apply topically 5 (five) times a day Apply and rub in 5x a day until healed as needed for lesion, Disp: , Rfl: 0  •  ferrous sulfate 324 (65 Fe) mg, Take 1 tablet by mouth 2 (two) times a day, Disp: , Rfl:   •  fexofenadine (ALLEGRA) 30 MG/5ML suspension, Take 30 mL (180 mg total) by mouth daily as needed (seasonal allergies), Disp: 237 mL, Rfl: 3  •  fluticasone (FLONASE) 50 mcg/act nasal spray, 1 spray into each nostril daily as needed for rhinitis or allergies, Disp: 1 Bottle, Rfl: 1  •  glimepiride (AMARYL) 1 mg tablet, Take 1 tablet (1 mg total) by mouth daily with breakfast, Disp: 90 tablet, Rfl: 3  •  hydrocortisone 1 % cream, Apply topically to affected area twice daily as needed for rash, Disp: 30 g, Rfl: 0  •  levothyroxine (SYNTHROID) 25 mcg tablet, Take 1 tablet (25 mcg) by mouth every morning, Disp: , Rfl: 0  •  linaGLIPtin (Tradjenta) 5 MG TABS, Take 5 mg by mouth daily (Patient not taking: Reported on 1/19/2023), Disp: , Rfl:   •  LORazepam (ATIVAN) 0 5 mg tablet, Take 0 5 mg by mouth daily Every day at 8pm, Disp: , Rfl:   •  metFORMIN (GLUCOPHAGE) 1000 MG tablet, Take 1 tablet (1,000 mg total) by mouth 2 (two) times a day with meals, Disp: 60 tablet, Rfl: 2  •  neomycin-bacitracin-polymyxin b (NEOSPORIN) ointment, Apply 1 application topically 2 (two) times a day as needed Apply to affected area BID PRN, Disp: , Rfl:   •  nitrofurantoin (MACRODANTIN) 50 mg capsule, Take 1 capsule by mouth daily at bedtime, Disp: , Rfl:   •  olopatadine HCl (PATADAY) 0 2 % opth drops, Administer 1 drop to both eyes as needed Instill 1 drop into affected eyes daily PRN for eye redness, Disp: , Rfl:   •  omeprazole (PriLOSEC) 20 mg delayed release capsule, Take 1 capsule (20 mg total) by mouth 2 (two) times a day before meals, Disp: 60 capsule, Rfl: 2  •  Oxybutynin Chloride (DITROPAN XL PO), Take 5 mg by mouth 3 (three) times a day 1 tablet TID, Disp: , Rfl:   •  polyethylene glycol (GLYCOLAX) 17 GM/SCOOP powder, DISSOLVE 17GM (1 CAPFUL) IN 8 OZ FLUIDS AND CONSUME BY MOUTH EVERY MORNING *HOLD 1 DAY FOR LOOSE STOOLS* (CONSTIPATION), Disp: 510 g, Rfl: 10  •  simvastatin (ZOCOR) 20 mg tablet, Take 1 tablet (20 mg total) by mouth daily with dinner at 4 pm for hyperlipidemia, Disp: , Rfl:   •  Starch, Thickening, POWD, Take by mouth daily Use on food and liquid as directed, Disp: 1020 g, Rfl: 3  •  traZODone (DESYREL) 100 mg tablet, Take 1 tablet (100 mg total) by mouth daily at  8 pm for depression, Disp: 90 tablet, Rfl: 0  •  valACYclovir (VALTREX) 1,000 mg tablet, Take 1 tablet (1,000 mg total) by mouth 3 (three) times a day for 7 days, Disp: 21 tablet, Rfl: 0  •  Vitamins A & D (VITAMIN A & D) ointment, Apply topically as needed for skin irritation, Disp: , Rfl:     Past Surgical History:   Procedure Laterality Date   • ABDOMINAL SURGERY     • COLECTOMY MIN      re-anastomosis 7/22/16   • COLONOSCOPY N/A 7/21/2016    Procedure: COLONOSCOPY;  Surgeon: Saige Cazares MD;  Location: [facility]; Service:    • COLONOSCOPY N/A 1/31/2016    Procedure: COLONOSCOPY;  Surgeon: Saige Cazares MD;  [facility];  Service:    • COLONOSCOPY N/A 7/25/2017    Procedure: COLONOSCOPY;  Surgeon: Saige Cazares MD;  Location: [facility];   Service: Colorectal   • COLOSTOMY     • EXPLORATORY LAPAROTOMY W/ BOWEL RESECTION N/A 1/31/2016    Procedure: exploratory laparotomy, left sigmoidectomy, coloproctostomy, take down splenic flexure, loop colostomy;  Surgeon: Saige Cazares MD;  [facility];  Service:    • OK CLOSURE ENTEROSTOMY LG/SMALL INTESTINE N/A 7/22/2016    Procedure: SEGMENTAL COLECTOMY WITH COLOCOLOSTOMY;  Surgeon: Saige Cazares MD;  [facility];  Service: Colorectal   • UPPER GASTROINTESTINAL ENDOSCOPY         Patient Active Problem List    Diagnosis Date Noted   • COVID-19 virus infection 11/07/2022   • Viral illness 11/04/2022   • Encounter for annual routine gynecological examination 10/07/2022   • Dystonic cerebral palsy ([facility] [address] ) 04/19/2022   • Cervical dystonia 04/19/2022   • Parkinsonism ([facility] [address] ) 01/21/2022   • 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[facility] [address] ) 10/18/2021   • Polyneuropathy associated with underlying disease ([facility] [address] ) 10/18/2021   • Herpes zoster without complication 51/26/5658   • Abnormal finding on lung imaging 04/06/2021   • Exposure to COVID-19 virus 01/12/2021   • Macrocytic anemia 84/49/6601   • Eosinophilic leukocytosis 32/30/7549   • Underweight 09/03/2020   • Dysphagia 08/01/2020   • Acute cystitis without hematuria 07/26/2020   • Decreased appetite 07/26/2020   • History of vitamin D deficiency 01/22/2020   • History of fall 10/28/2019   • Abdominal bloating 09/26/2019   • Breast asymmetry 07/09/2019   • Hypernatremia 12/28/2018   • Severe sepsis ([address] ) 12/17/2018   • Gait disturbance 06/26/2018   • Osteoarthritis of both hips 06/08/2018   • Severe Intellectual disability 04/19/2018   • Type 2 diabetes mellitus, without long-term current use of insulin ([address] ) 04/19/2018   • Abnormal albumin 09/18/2017   • Peripheral neuropathy 09/18/2017   • Seasonal allergies 06/29/2017   • Physical deconditioning 05/09/2017   • Hyperlipidemia 03/27/2017   • Gastroesophageal reflux disease 03/27/2017   • Cerebral palsy ([address] ) 03/27/2017   • Spasticity 03/27/2017   • Ambulatory dysfunction 03/27/2017   • Recurrent UTI (urinary tract infection) 03/27/2017   • Bipolar disorder ([address] ) 03/27/2017   • Hypotension 04/11/2016   • 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[address] ) 02/01/2016   • Osteoporosis 01/19/2016   • Resting tremor 01/19/2016   • Toxic metabolic encephalopathy 59/44/2166   • Gait abnormality 12/11/2015   • Other chronic pain 07/30/2015   • Anxiety 07/15/2015   • Menopause 09/25/2014   • Chondrodermatitis nodularis helicis of left ear 63/09/9765   • Atopic dermatitis 06/02/2014   • Dry eye 06/02/2014   • Constipation 04/12/2013   • Iron deficiency anemia 03/27/2013   • Urinary incontinence 03/27/2013   • Hypothyroidism 01/10/2013

## 2023-02-01 NOTE — PATIENT INSTRUCTIONS
Will plan to hold off on therapy till we get to a dose that seems to actually be making a difference  Hold off on MAFOs until she is working with therapy  3  Repeat botox - higher dose next cycle

## 2023-02-01 NOTE — PROGRESS NOTES
Review of Systems   Constitutional: Negative  Negative for appetite change, chills and fever  HENT: Negative  Negative for ear pain, hearing loss, sore throat, tinnitus, trouble swallowing and voice change  Eyes: Negative  Negative for photophobia, pain and visual disturbance  Respiratory: Negative  Negative for cough and shortness of breath  Cardiovascular: Negative  Negative for chest pain and palpitations  Gastrointestinal: Negative  Negative for abdominal pain, nausea and vomiting  Endocrine: Negative  Negative for cold intolerance  Genitourinary: Negative  Negative for dysuria, frequency, hematuria and urgency  Musculoskeletal: Negative  Negative for arthralgias, back pain, gait problem, myalgias and neck pain  Skin: Negative  Negative for color change and rash  Allergic/Immunologic: Negative  Neurological: Negative  Negative for dizziness, tremors, seizures, syncope, facial asymmetry, speech difficulty, weakness, light-headedness, numbness and headaches  Hematological: Negative  Does not bruise/bleed easily  Psychiatric/Behavioral: Negative  Negative for confusion, hallucinations and sleep disturbance  All other systems reviewed and are negative

## 2023-02-02 NOTE — TELEPHONE ENCOUNTER
Received VM transcription:    Hi, this is Beth with [employer] calling in reference to [employer]  Our medical director has reviewed the request for the manual wheelchair and accessories, and has approved it as medically necessary except for the pneumatic propulsion tires which are included in the initial issue of the manual wheelchair and are not available to be billed separately  The tires will be on her wheelchair, but they cannot bill us for an additional charge for those  This is an administrative denial  There is no peer review available  And the [employer] disclaimer is in effect  Have a great day bye

## 2023-02-03 NOTE — TELEPHONE ENCOUNTER
CHING reached out to [place] at [facility] at [phone], ext [phone], to make her aware of response from insurance company regarding tires for manual wheelchair  No answer  MSW relayed message in voicemail and requested a callback  Awaiting same

## 2023-02-06 ENCOUNTER — TELEPHONE (OUTPATIENT)
Dept: FAMILY MEDICINE CLINIC | Facility: CLINIC | Age: 63
End: 2023-02-06

## 2023-02-07 NOTE — TELEPHONE ENCOUNTER
MSW phoned [address]  at [facility] at [phone], ext [phone]  [address]  stated that they received the same message about the wheels for the manual wheelchair  [address]  stated that patient will not be subject to an upcharge  [address]  did state that the manual wheelchair will be a rental for 13 months, so there will be a rental fee  [address]  stated that Medicaid will  the cost of the rental fee, and that patient would only be responsible for the rental fee if she were to lose her Medicaid benefits  [address]  stated that she will call patient to review above this date and that patient should receive her chair in 1-1 5 months  MSW will follow-up in mid March regarding delivery of the manual wheelchair

## 2023-02-22 DIAGNOSIS — G20 PARKINSON'S DISEASE (HCC): ICD-10-CM

## 2023-03-11 DIAGNOSIS — R05.9 COUGH: ICD-10-CM

## 2023-03-13 RX ORDER — BENZONATATE 100 MG/1
CAPSULE ORAL
Qty: 5 CAPSULE | Refills: 11 | Status: SHIPPED | OUTPATIENT
Start: 2023-03-13

## 2023-03-30 ENCOUNTER — TELEPHONE (OUTPATIENT)
Dept: NEUROLOGY | Facility: CLINIC | Age: 63
End: 2023-03-30

## 2023-03-30 DIAGNOSIS — G80.0 SPASTIC QUADRIPARESIS SECONDARY TO CEREBRAL PALSY (HCC): Primary | ICD-10-CM

## 2023-03-30 RX ORDER — ONABOTULINUMTOXINA 100 [USP'U]/1
INJECTION, POWDER, LYOPHILIZED, FOR SOLUTION INTRADERMAL; INTRAMUSCULAR
Qty: 3 EACH | Refills: 0 | Status: SHIPPED | OUTPATIENT
Start: 2023-03-30

## 2023-03-30 NOTE — TELEPHONE ENCOUNTER
----- Message from Isabel Ro MD sent at 2/1/2023  1:56 PM EST -----  Can we get 500 units for her next round of botulinum toxin scheduled for April 11, 2023  Would not proceed with botulinum toxin unless we can get a higher dose  Her  is her POA       Thanks so much,  AdP

## 2023-03-30 NOTE — TELEPHONE ENCOUNTER
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

## 2023-03-30 NOTE — TELEPHONE ENCOUNTER
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

## 2023-03-31 ENCOUNTER — OFFICE VISIT (OUTPATIENT)
Dept: FAMILY MEDICINE CLINIC | Facility: CLINIC | Age: 63
End: 2023-03-31

## 2023-03-31 VITALS
SYSTOLIC BLOOD PRESSURE: 126 MMHG | RESPIRATION RATE: 18 BRPM | TEMPERATURE: 97.3 F | HEART RATE: 74 BPM | DIASTOLIC BLOOD PRESSURE: 84 MMHG | OXYGEN SATURATION: 95 % | WEIGHT: 107 LBS | HEIGHT: 58 IN | BODY MASS INDEX: 22.46 KG/M2

## 2023-03-31 DIAGNOSIS — E83.52 HYPERCALCEMIA: Primary | ICD-10-CM

## 2023-03-31 DIAGNOSIS — Z11.1 SCREENING-PULMONARY TB: ICD-10-CM

## 2023-03-31 DIAGNOSIS — Z23 NEED FOR SHINGLES VACCINE: ICD-10-CM

## 2023-03-31 DIAGNOSIS — E11.00 TYPE 2 DIABETES MELLITUS WITH HYPEROSMOLARITY WITHOUT COMA, WITHOUT LONG-TERM CURRENT USE OF INSULIN (HCC): ICD-10-CM

## 2023-03-31 PROBLEM — R63.0 DECREASED APPETITE: Status: RESOLVED | Noted: 2020-07-26 | Resolved: 2023-03-31

## 2023-03-31 PROBLEM — R14.0 ABDOMINAL BLOATING: Status: RESOLVED | Noted: 2019-09-26 | Resolved: 2023-03-31

## 2023-03-31 PROBLEM — U07.1 COVID-19 VIRUS INFECTION: Status: RESOLVED | Noted: 2022-11-07 | Resolved: 2023-03-31

## 2023-03-31 PROBLEM — N30.00 ACUTE CYSTITIS WITHOUT HEMATURIA: Status: RESOLVED | Noted: 2020-07-26 | Resolved: 2023-03-31

## 2023-03-31 PROBLEM — Z20.822 EXPOSURE TO COVID-19 VIRUS: Status: RESOLVED | Noted: 2021-01-12 | Resolved: 2023-03-31

## 2023-03-31 RX ORDER — ZOSTER VACCINE RECOMBINANT, ADJUVANTED 50 MCG/0.5
0.5 KIT INTRAMUSCULAR ONCE
Qty: 1 EACH | Refills: 1 | Status: SHIPPED | OUTPATIENT
Start: 2023-03-31 | End: 2023-03-31

## 2023-03-31 NOTE — ASSESSMENT & PLAN NOTE
Lab Results   Component Value Date    HGBA1C 6 8 (H) 01/18/2023     Stable, goal A1c < 7  FG levels appear to be well controlled 60-130s range, no notable hypoglycemia symptoms  Will continue metformin 1000mg BID, glimepiride 1mg daily  Ordered A1c, to be completed anytime after 4/18

## 2023-03-31 NOTE — ASSESSMENT & PLAN NOTE
Mild hypercalcemia at 10 3 with corrected calcium at 11  Could be related to low albumin, calcium-Vitamin D supplementation  Will do additional workup with PTH and Vitamin D levels  Discussed with caregiver, will follow-up on results and if there are no abnormalities to address, can decrease calcium supplementation at next visit

## 2023-03-31 NOTE — PROGRESS NOTES
Name: Eric Benoit      : 1960      MRN: [id number]  Encounter Provider: Ghassan Batista MD  Encounter Date: 3/31/2023   Encounter department: [address] [place] [address]  Hypercalcemia  Assessment & Plan:  Mild hypercalcemia at 10 3 with corrected calcium at 11  Could be related to low albumin, calcium-Vitamin D supplementation  Will do additional workup with PTH and Vitamin D levels  Discussed with caregiver, will follow-up on results and if there are no abnormalities to address, can decrease calcium supplementation at next visit     Orders:  -     PTH, intact; Future  -     Vitamin D 25 hydroxy; Future    2  Screening-pulmonary TB  Assessment & Plan:  Ordered Quantiferon - requires screening for assisted living facility     Orders:  -     Quantiferon TB Gold Plus; Future    3  Type 2 diabetes mellitus with hyperosmolarity without coma, without long-term current use of insulin (HCC)  Assessment & Plan:    Lab Results   Component Value Date    HGBA1C 6 8 (H) 2023     Stable, goal A1c < 7  FG levels appear to be well controlled 60-130s range, no notable hypoglycemia symptoms  Will continue metformin 1000mg BID, glimepiride 1mg daily  Ordered A1c, to be completed anytime after       Orders:  -     HEMOGLOBIN A1C W/ EAG ESTIMATION; Future    4  Need for shingles vaccine  Assessment & Plan:  Ordered Shingrex to patient's pharmacy  Next dose due in 6 months     Orders:  -     Zoster Vac Recomb Adjuvanted (Shingrix) 50 MCG/0 5ML SUSR; Inject 0 5 mL into a muscle once for 1 dose Repeat dose in 2 to 6 months           Subjective      62 YOF presenting with her caregiver Jeannie Murguia for general follow-up  Overall doing well  Her FG levels appear to be well-controlled in the 60-130s and per caregiver, no concerns for signs of hypoglycemia  The patient has been more interactive, talkative lately and it is believed to be due to titrating down her Abilify by neurology  She is getting a botox injection at an increased dose for her spastic quadriparesis with PMR  Caregiver requests lab slips for Quantiferon-Gold, A1c  Discussed that A1c will need to be done after 4/18  Shingrex was ordered to the patient's pharmacy       Review of Systems   Unable to perform ROS: Patient nonverbal       Current Outpatient Medications on File Prior to Visit   Medication Sig   • acetaminophen (TYLENOL) 160 mg/5 mL liquid Take 10 mL (320 mg total) by mouth every 4 (four) hours as needed for mild pain or fever (Fevers greater than 100 4 F)   • ARIPiprazole (ABILIFY) 2 mg tablet Take 1 tablet by mouth daily at bedtime   • baclofen 10 mg tablet Take 1 tablet (10 mg total) by mouth 3 (three) times a day   • benzonatate (TESSALON PERLES) 100 mg capsule TAKE 1 CAPSULE BY MOUTH EVERY 8 HOURS AS NEEDED FOR COUGH **DO NOT CRUSH**   • Botox 100 units INJECT UP  UNITS IN THE MUSCLE INTO LOWER AND UPPER EXTREMITIES ONCE EVERY 3 MONTHS   • calcium carbonate (OS-OMRRIS) 600 MG tablet Take 1 tablet (600 mg total) by mouth 2 (two) times a day with meals   • carbamide peroxide (DEBROX) 6 5 % otic solution 2 drops 2 drops in the affected ear prn x 5 days   • carbidopa-levodopa (Sinemet)  mg per tablet Take 1 5 tabs TID   • carboxymethylcellulose (REFRESH PLUS) 0 5 % SOLN 1 drop 3 (three) times a day as needed for dry eyes   • Cholecalciferol (Vitamin D3) 50 MCG (2000 UT) TABS    • citalopram (CeleXA) 40 mg tablet Take 1 tablet by mouth daily   • denosumab (PROLIA) 60 mg/mL Inject 1 mL (60 mg total) under the skin once for 1 dose   • DIABETIC TUSSIN  MG/5ML oral liquid TAKE 2 TEASPOONSFUL (10ML) BY MOUTH EVERY 4 HOURS AS NEEDED FOR COUGH & CONGESTION   • divalproex sodium (DEPAKOTE SPRINKLE) 125 MG capsule 4 capsules 2 (two) times a day 4 caps BID   • docosanol (ABREVA) 10 % Apply topically 5 (five) times a day Apply and rub in 5x a day until healed as needed for lesion   • ferrous sulfate 324 (65 Fe) mg Take 1 tablet by mouth 2 (two) times a day   • fexofenadine (ALLEGRA) 30 MG/5ML suspension Take 30 mL (180 mg total) by mouth daily as needed (seasonal allergies)   • fluticasone (FLONASE) 50 mcg/act nasal spray 1 spray into each nostril daily as needed for rhinitis or allergies   • glimepiride (AMARYL) 1 mg tablet Take 1 tablet (1 mg total) by mouth daily with breakfast   • hydrocortisone 1 % cream Apply topically to affected area twice daily as needed for rash   • levothyroxine (SYNTHROID) 25 mcg tablet Take 1 tablet (25 mcg) by mouth every morning   • linaGLIPtin (Tradjenta) 5 MG TABS Take 5 mg by mouth daily   • LORazepam (ATIVAN) 0 5 mg tablet Take 0 5 mg by mouth daily Every day at 8pm   • metFORMIN (GLUCOPHAGE) 1000 MG tablet Take 1 tablet (1,000 mg total) by mouth 2 (two) times a day with meals   • neomycin-bacitracin-polymyxin b (NEOSPORIN) ointment Apply 1 application topically 2 (two) times a day as needed Apply to affected area BID PRN   • nitrofurantoin (MACRODANTIN) 50 mg capsule Take 1 capsule by mouth daily at bedtime   • olopatadine HCl (PATADAY) 0 2 % opth drops Administer 1 drop to both eyes as needed Instill 1 drop into affected eyes daily PRN for eye redness   • omeprazole (PriLOSEC) 20 mg delayed release capsule Take 1 capsule (20 mg total) by mouth 2 (two) times a day before meals   • Oxybutynin Chloride (DITROPAN XL PO) Take 5 mg by mouth 3 (three) times a day 1 tablet TID   • polyethylene glycol (GLYCOLAX) 17 GM/SCOOP powder DISSOLVE 17GM (1 CAPFUL) IN 8 OZ FLUIDS AND CONSUME BY MOUTH EVERY MORNING *HOLD 1 DAY FOR LOOSE STOOLS* (CONSTIPATION)   • simvastatin (ZOCOR) 20 mg tablet Take 1 tablet (20 mg total) by mouth daily with dinner at 4 pm for hyperlipidemia   • Starch, Thickening, POWD Take by mouth daily Use on food and liquid as directed   • traZODone (DESYREL) 100 mg tablet Take 1 tablet (100 mg total) by mouth daily at  8 pm for depression   • valACYclovir (VALTREX) 1,000 mg tablet "Take 1 tablet (1,000 mg total) by mouth 3 (three) times a day for 7 days   • Vitamins A & D (VITAMIN A & D) ointment Apply topically as needed for skin irritation       Objective     /84 (BP Location: Left arm, Patient Position: Sitting, Cuff Size: Standard)   Pulse 74   Temp (!) 97 3 °F (36 3 °C) (Temporal)   Resp 18   Ht 4' 10\" (1 473 m)   Wt 48 5 kg (107 lb)   LMP 11/29/2009 (Exact Date)   SpO2 95%   BMI 22 36 kg/m²     Physical Exam  Vitals reviewed  Constitutional:       General: She is not in acute distress  Appearance: Normal appearance  HENT:      Head: Normocephalic and atraumatic  Nose: Nose normal    Eyes:      Extraocular Movements: Extraocular movements intact  Conjunctiva/sclera: Conjunctivae normal    Cardiovascular:      Rate and Rhythm: Normal rate and regular rhythm  Heart sounds: Normal heart sounds  No murmur heard  Pulmonary:      Effort: Pulmonary effort is normal  No respiratory distress  Breath sounds: Normal breath sounds  Abdominal:      General: Abdomen is flat  Bowel sounds are normal       Palpations: Abdomen is soft  Musculoskeletal:      Cervical back: Normal range of motion  Comments: In wheelchair    Neurological:      Mental Status: She is alert  Mental status is at baseline     Psychiatric:         Mood and Affect: Mood normal          Behavior: Behavior normal        Mauricio Baer MD  "

## 2023-04-03 ENCOUNTER — TELEPHONE (OUTPATIENT)
Dept: FAMILY MEDICINE CLINIC | Facility: CLINIC | Age: 63
End: 2023-04-03

## 2023-04-03 NOTE — TELEPHONE ENCOUNTER
Detailed Written Order came under Dr Ulises Lawson name and need to be signed by that provider  Form placed in YELLOW clinical folder for signature

## 2023-04-03 NOTE — TELEPHONE ENCOUNTER
Folder (To be completed by) -Dr Cindy Purdy      Name of Form - Detailed written order     Color folder [facility])    Form to be Faxed (Fax #): [phone]    Patient was made aware of the 7-10 business day form policy

## 2023-04-03 NOTE — TELEPHONE ENCOUNTER
After reviewing patient chart the documents scanned in media are not the detailed written order  Those are labs results for influenza

## 2023-04-04 NOTE — TELEPHONE ENCOUNTER
[address] and scheduled delivery with Shelia for:    Botox-100 units  Qty:5= 500 Units total  Delivery to [facility]  Scheduled for 4/05/2023  Via: [employer]    Please advise if medication doesn't arrive

## 2023-04-06 NOTE — TELEPHONE ENCOUNTER
Botox number of units: 100 Units  Botox quantity: 5  Arrived at what location: [facility]  Botox at [facility]: yes  NDC number: [id number]  Lot number: [id number]  Expiration Date: 09/2025  Appt notes indicate correct medication: yes

## 2023-04-06 NOTE — TELEPHONE ENCOUNTER
[address] [facility] [address] and spoke to Diana Avila regarding patients Botox delivery not arriving as scheduled  Diana Avila provided me w/ the following [employer]AP[employer] Tracking# [id number] that states:    Botox-100 Units  Qty: 5 = 500 Units total  Delivery will arrive Today 4/06/2023  To our [place] location via [employer]  Please advise if this patients medication does not arrive

## 2023-04-10 NOTE — TELEPHONE ENCOUNTER
Form has been completed by Dr Barrett Good and placed in yellow folder in clerical area to be scanned into patient's chart and faxed

## 2023-04-11 PROBLEM — G80.8: Status: ACTIVE | Noted: 2017-03-27

## 2023-04-24 ENCOUNTER — OFFICE VISIT (OUTPATIENT)
Dept: FAMILY MEDICINE CLINIC | Facility: CLINIC | Age: 63
End: 2023-04-24

## 2023-04-24 VITALS
SYSTOLIC BLOOD PRESSURE: 122 MMHG | HEART RATE: 91 BPM | TEMPERATURE: 97.8 F | RESPIRATION RATE: 20 BRPM | DIASTOLIC BLOOD PRESSURE: 81 MMHG

## 2023-04-24 DIAGNOSIS — B02.9 HERPES ZOSTER WITHOUT COMPLICATION: Primary | ICD-10-CM

## 2023-04-24 RX ORDER — GABAPENTIN 100 MG/1
100 CAPSULE ORAL 3 TIMES DAILY
Qty: 30 CAPSULE | Refills: 0 | Status: SHIPPED | OUTPATIENT
Start: 2023-04-24 | End: 2023-04-24 | Stop reason: CLARIF

## 2023-04-24 RX ORDER — VALACYCLOVIR HYDROCHLORIDE 1 G/1
1000 TABLET, FILM COATED ORAL 2 TIMES DAILY
Qty: 14 TABLET | Refills: 0 | Status: SHIPPED | OUTPATIENT
Start: 2023-04-24 | End: 2023-05-01

## 2023-04-24 NOTE — LETTER
April 24, 2023     Patient: Sukhjinder Wiley  YOB: 1960  Date of Visit: 4/24/2023      To Whom it May Concern:    Aby Cony is under my professional care  Andria Downs was seen in my office on 4/24/2023  Andria Downs returns today program 5/2/23 after completion of medication therapy  If you have any questions or concerns, please don't hesitate to call           Sincerely,          Aleshia Nguyen 87, DO        CC: No Recipients

## 2023-04-24 NOTE — PROGRESS NOTES
Name: Tova Carranza      : 1960      MRN: [id number]  Encounter Provider: Fransisca Falk DO  Encounter Date: 2023   Encounter department: [address] [place] [address]  Herpes zoster without complication  Assessment & Plan: Onset of erythematous, raised rash on L lower abdomen this morning  Denies any pain  Plan:  - valcyclovir 1000 mg BID x 7 days in setting of CrCl 45  - can use tylenol and motrin prn for pain control  - can return to day program after completion of medication therapy  - f/u if sxs worsen or no improvement  Orders:  -     valACYclovir (VALTREX) 1,000 mg tablet; Take 1 tablet (1,000 mg total) by mouth 2 (two) times a day for 7 days         Subjective      63year-old non-verbal female presents with her caretakers, Dominique Carrizales and Jaci Carter, and her co-resident at her group home for evaluation of rash in LLQ that was first noticed this morning  Dominique Carrizales denies that patient has complained of any pain  She has a hx of shingles about 2 years ago  She was ordered Shingrix vaccines and has the first dose at home ready to give  No other concerns or complaints      Review of Systems   Unable to perform ROS: Patient nonverbal       Current Outpatient Medications on File Prior to Visit   Medication Sig   • acetaminophen (TYLENOL) 160 mg/5 mL liquid Take 10 mL (320 mg total) by mouth every 4 (four) hours as needed for mild pain or fever (Fevers greater than 100 4 F)   • ARIPiprazole (ABILIFY) 2 mg tablet Take 1 tablet by mouth daily at bedtime   • baclofen 10 mg tablet Take 1 tablet (10 mg total) by mouth 3 (three) times a day   • benzonatate (TESSALON PERLES) 100 mg capsule TAKE 1 CAPSULE BY MOUTH EVERY 8 HOURS AS NEEDED FOR COUGH **DO NOT CRUSH**   • Botox 100 units INJECT UP  UNITS IN THE MUSCLE INTO LOWER AND UPPER EXTREMITIES ONCE EVERY 3 MONTHS   • calcium carbonate (OS-MORRIS) 600 MG tablet Take 1 tablet (600 mg total) by mouth 2 (two) times a day with meals   • carbamide peroxide (DEBROX) 6 5 % otic solution 2 drops 2 drops in the affected ear prn x 5 days   • carbidopa-levodopa (SINEMET CR)  mg TBCR per ER tablet Take 2 tablets by mouth 2 (two) times a day Take morning dose 7 Am and afternoon dose at 4 PM   • carboxymethylcellulose (REFRESH PLUS) 0 5 % SOLN 1 drop 3 (three) times a day as needed for dry eyes   • Cholecalciferol (Vitamin D3) 50 MCG (2000 UT) TABS    • citalopram (CeleXA) 40 mg tablet Take 1 tablet by mouth daily   • denosumab (PROLIA) 60 mg/mL Inject 1 mL (60 mg total) under the skin once for 1 dose   • DIABETIC TUSSIN  MG/5ML oral liquid TAKE 2 TEASPOONSFUL (10ML) BY MOUTH EVERY 4 HOURS AS NEEDED FOR COUGH & CONGESTION   • divalproex sodium (DEPAKOTE SPRINKLE) 125 MG capsule 4 capsules 2 (two) times a day 4 caps BID   • docosanol (ABREVA) 10 % Apply topically 5 (five) times a day Apply and rub in 5x a day until healed as needed for lesion   • ferrous sulfate 324 (65 Fe) mg Take 1 tablet by mouth 2 (two) times a day   • fexofenadine (ALLEGRA) 30 MG/5ML suspension Take 30 mL (180 mg total) by mouth daily as needed (seasonal allergies)   • fluticasone (FLONASE) 50 mcg/act nasal spray 1 spray into each nostril daily as needed for rhinitis or allergies   • glimepiride (AMARYL) 1 mg tablet Take 1 tablet (1 mg total) by mouth daily with breakfast   • hydrocortisone 1 % cream Apply topically to affected area twice daily as needed for rash   • levothyroxine (SYNTHROID) 25 mcg tablet Take 1 tablet (25 mcg) by mouth every morning   • linaGLIPtin (Tradjenta) 5 MG TABS Take 5 mg by mouth daily   • LORazepam (ATIVAN) 0 5 mg tablet Take 0 5 mg by mouth daily Every day at 8pm   • metFORMIN (GLUCOPHAGE) 1000 MG tablet Take 1 tablet (1,000 mg total) by mouth 2 (two) times a day with meals   • neomycin-bacitracin-polymyxin b (NEOSPORIN) ointment Apply 1 application topically 2 (two) times a day as needed Apply to affected area BID PRN   • nitrofurantoin (MACRODANTIN) 50 mg capsule Take 1 capsule by mouth daily at bedtime   • olopatadine HCl (PATADAY) 0 2 % opth drops Administer 1 drop to both eyes as needed Instill 1 drop into affected eyes daily PRN for eye redness   • omeprazole (PriLOSEC) 20 mg delayed release capsule Take 1 capsule (20 mg total) by mouth 2 (two) times a day before meals   • oxybutynin (DITROPAN) 5 mg tablet    • Oxybutynin Chloride (DITROPAN XL PO) Take 5 mg by mouth 3 (three) times a day 1 tablet TID   • polyethylene glycol (GLYCOLAX) 17 GM/SCOOP powder DISSOLVE 17GM (1 CAPFUL) IN 8 OZ FLUIDS AND CONSUME BY MOUTH EVERY MORNING *HOLD 1 DAY FOR LOOSE STOOLS* (CONSTIPATION)   • simvastatin (ZOCOR) 20 mg tablet Take 1 tablet (20 mg total) by mouth daily with dinner at 4 pm for hyperlipidemia   • Starch, Thickening, POWD Take by mouth daily Use on food and liquid as directed   • traZODone (DESYREL) 100 mg tablet Take 1 tablet (100 mg total) by mouth daily at  8 pm for depression   • Vitamins A & D (VITAMIN A & D) ointment Apply topically as needed for skin irritation   • [DISCONTINUED] valACYclovir (VALTREX) 1,000 mg tablet Take 1 tablet (1,000 mg total) by mouth 3 (three) times a day for 7 days       Objective     /81   Pulse 91   Temp 97 8 °F (36 6 °C)   Resp 20   LMP 11/29/2009 (Exact Date)     Physical Exam  Vitals reviewed  Constitutional:       General: She is not in acute distress  Appearance: Normal appearance  HENT:      Head: Normocephalic and atraumatic  Right Ear: External ear normal       Left Ear: External ear normal       Nose: Nose normal       Mouth/Throat:      Pharynx: Oropharynx is clear  Eyes:      Conjunctiva/sclera: Conjunctivae normal    Cardiovascular:      Rate and Rhythm: Normal rate  Pulses: Normal pulses  Pulmonary:      Effort: Pulmonary effort is normal    Abdominal:      Palpations: Abdomen is soft  Musculoskeletal:      Cervical back: Neck supple  Skin:     General: Skin is warm  Capillary Refill: Capillary refill takes less than 2 seconds  Comments: Maculopapular raised erythematous rash with one vesicle on the lower edge of the rash in LLQ in dermatome pattern     Neurological:      Mental Status: She is alert and oriented to person, place, and time  Gait: Gait normal    Psychiatric:         Mood and Affect: Mood normal          Behavior: Behavior normal          Thought Content:  Thought content normal          Judgment: Judgment normal              Inderjit Sheppard, DO

## 2023-04-24 NOTE — ASSESSMENT & PLAN NOTE
Onset of erythematous, raised rash on L lower abdomen this morning  Denies any pain  Plan:  - valcyclovir 1000 mg BID x 7 days in setting of CrCl 45  - can use tylenol and motrin prn for pain control  - can return to day program after completion of medication therapy  - f/u if sxs worsen or no improvement

## 2023-04-27 ENCOUNTER — TELEPHONE (OUTPATIENT)
Dept: FAMILY MEDICINE CLINIC | Facility: CLINIC | Age: 63
End: 2023-04-27

## 2023-04-27 NOTE — TELEPHONE ENCOUNTER
FYI      Secondary outbreak of shingles around patients right ear  3 tiny in front of ear  1-2 MM area at base  Today going down along the hairline about 4    Abdomen still the same

## 2023-05-12 ENCOUNTER — APPOINTMENT (OUTPATIENT)
Dept: LAB | Age: 63
End: 2023-05-12

## 2023-05-12 ENCOUNTER — TELEPHONE (OUTPATIENT)
Dept: NEUROLOGY | Facility: CLINIC | Age: 63
End: 2023-05-12

## 2023-05-12 ENCOUNTER — OFFICE VISIT (OUTPATIENT)
Dept: FAMILY MEDICINE CLINIC | Facility: CLINIC | Age: 63
End: 2023-05-12

## 2023-05-12 ENCOUNTER — APPOINTMENT (OUTPATIENT)
Dept: RADIOLOGY | Age: 63
End: 2023-05-12

## 2023-05-12 VITALS — HEART RATE: 71 BPM | SYSTOLIC BLOOD PRESSURE: 115 MMHG | DIASTOLIC BLOOD PRESSURE: 74 MMHG | TEMPERATURE: 97.2 F

## 2023-05-12 DIAGNOSIS — M24.541 CONTRACTURE OF RIGHT HAND: ICD-10-CM

## 2023-05-12 DIAGNOSIS — M24.541 CONTRACTURE OF RIGHT HAND: Primary | ICD-10-CM

## 2023-05-12 LAB
ALBUMIN SERPL BCP-MCNC: 3.1 G/DL (ref 3.5–5)
ALP SERPL-CCNC: 43 U/L (ref 46–116)
ALT SERPL W P-5'-P-CCNC: 10 U/L (ref 12–78)
ANION GAP SERPL CALCULATED.3IONS-SCNC: 2 MMOL/L (ref 4–13)
AST SERPL W P-5'-P-CCNC: 23 U/L (ref 5–45)
BILIRUB SERPL-MCNC: 0.16 MG/DL (ref 0.2–1)
BUN SERPL-MCNC: 17 MG/DL (ref 5–25)
CALCIUM ALBUM COR SERPL-MCNC: 10.9 MG/DL (ref 8.3–10.1)
CALCIUM SERPL-MCNC: 10.2 MG/DL (ref 8.3–10.1)
CHLORIDE SERPL-SCNC: 103 MMOL/L (ref 96–108)
CO2 SERPL-SCNC: 33 MMOL/L (ref 21–32)
CREAT SERPL-MCNC: 0.94 MG/DL (ref 0.6–1.3)
GFR SERPL CREATININE-BSD FRML MDRD: 66 ML/MIN/1.73SQ M
GLUCOSE SERPL-MCNC: 59 MG/DL (ref 65–140)
POTASSIUM SERPL-SCNC: 4.4 MMOL/L (ref 3.5–5.3)
PROT SERPL-MCNC: 7 G/DL (ref 6.4–8.4)
SODIUM SERPL-SCNC: 138 MMOL/L (ref 135–147)

## 2023-05-12 RX ORDER — ACETAMINOPHEN 160 MG/5ML
975 SUSPENSION ORAL 3 TIMES DAILY
Qty: 457.5 ML | Refills: 0 | Status: SHIPPED | OUTPATIENT
Start: 2023-05-12 | End: 2023-05-17

## 2023-05-12 NOTE — PROGRESS NOTES
Name: Owen Otoole      : 1960      MRN: [id number]  Encounter Provider: Lisa Bailey MD  Encounter Date: 2023   Encounter department: [address] [place] [address]  Contracture of right hand  Assessment & Plan:  Noted yesterday but has been stiffer than usual over the last week  Otherwise, at baseline  No changes in other behavior  On exam, ROM intact but right hand/wrist is likely tender given patient's reaction when manipulated, sensation is intact wo erythema or obvious swelling  Hand is contracted and feels stiff  Able to open hand passively  Suspicious for worsening spastic contracture of right hand, unlikely CVA  Will check XR right hand & wrist   Given discomfort concerning for tenderness, will do short course of Tylenol 975 mg TID x5 days  Advised to f/u with neurology, may need MRI but uncertain at this time    Orders:  -     XR hand 3+ vw right; Future; Expected date: 2023  -     XR wrist 3+ vw right; Future; Expected date: 2023  -     Comprehensive metabolic panel; Future  -     acetaminophen (TYLENOL) 160 mg/5 mL liquid; Take 30 5 mL (975 mg total) by mouth 3 (three) times a day for 5 days       Subjective      Charo Mckeon is a 82-year-old female group home resident with history of severe intellectual disability, spastic quadriparesis and parkinson's disease here with caretaker due to concern for right hand abnormal contracture that was noted yesterday  Per caretaker, patient is usually responsive with the right hand and is able to stretch it out but has been notably stiffer than usual over the last week  Patient recently started a day program and was noted to be unable to grab a peg out of a bowl as usual during activity time  She reports no other changes in behavior and other than that, she has remained at baseline  No changes with mood, affect, sleep or appetite  No rashes or obvious swelling   She denies any recent fall, injury or direct local trauma  Of note, patient follows regularly with neurologist for spastic quadriparesis and parkinson's disease      Review of Systems   Unable to perform ROS: Patient nonverbal       Current Outpatient Medications on File Prior to Visit   Medication Sig   • ARIPiprazole (ABILIFY) 2 mg tablet Take 1 tablet by mouth daily at bedtime   • baclofen 10 mg tablet Take 1 tablet (10 mg total) by mouth 3 (three) times a day   • benzonatate (TESSALON PERLES) 100 mg capsule TAKE 1 CAPSULE BY MOUTH EVERY 8 HOURS AS NEEDED FOR COUGH **DO NOT CRUSH**   • Botox 100 units INJECT UP  UNITS IN THE MUSCLE INTO LOWER AND UPPER EXTREMITIES ONCE EVERY 3 MONTHS   • calcium carbonate (OS-MORRIS) 600 MG tablet Take 1 tablet (600 mg total) by mouth 2 (two) times a day with meals   • carbamide peroxide (DEBROX) 6 5 % otic solution 2 drops 2 drops in the affected ear prn x 5 days   • carbidopa-levodopa (SINEMET CR)  mg TBCR per ER tablet Take 2 tablets by mouth 2 (two) times a day Take morning dose 7 Am and afternoon dose at 4 PM   • carboxymethylcellulose (REFRESH PLUS) 0 5 % SOLN 1 drop 3 (three) times a day as needed for dry eyes   • Cholecalciferol (Vitamin D3) 50 MCG (2000 UT) TABS    • citalopram (CeleXA) 40 mg tablet Take 1 tablet by mouth daily   • DIABETIC TUSSIN  MG/5ML oral liquid TAKE 2 TEASPOONSFUL (10ML) BY MOUTH EVERY 4 HOURS AS NEEDED FOR COUGH & CONGESTION   • divalproex sodium (DEPAKOTE SPRINKLE) 125 MG capsule 4 capsules 2 (two) times a day 4 caps BID   • docosanol (ABREVA) 10 % Apply topically 5 (five) times a day Apply and rub in 5x a day until healed as needed for lesion   • fexofenadine (ALLEGRA) 30 MG/5ML suspension Take 30 mL (180 mg total) by mouth daily as needed (seasonal allergies)   • fluticasone (FLONASE) 50 mcg/act nasal spray 1 spray into each nostril daily as needed for rhinitis or allergies   • glimepiride (AMARYL) 1 mg tablet Take 1 tablet (1 mg total) by mouth daily with breakfast   • hydrocortisone 1 % cream Apply topically to affected area twice daily as needed for rash   • levothyroxine (SYNTHROID) 25 mcg tablet Take 1 tablet (25 mcg) by mouth every morning   • linaGLIPtin (Tradjenta) 5 MG TABS Take 5 mg by mouth daily   • LORazepam (ATIVAN) 0 5 mg tablet Take 0 5 mg by mouth daily Every day at 8pm   • metFORMIN (GLUCOPHAGE) 1000 MG tablet Take 1 tablet (1,000 mg total) by mouth 2 (two) times a day with meals   • neomycin-bacitracin-polymyxin b (NEOSPORIN) ointment Apply 1 application topically 2 (two) times a day as needed Apply to affected area BID PRN   • nitrofurantoin (MACRODANTIN) 50 mg capsule Take 1 capsule by mouth daily at bedtime   • olopatadine HCl (PATADAY) 0 2 % opth drops Administer 1 drop to both eyes as needed Instill 1 drop into affected eyes daily PRN for eye redness   • oxybutynin (DITROPAN) 5 mg tablet    • Oxybutynin Chloride (DITROPAN XL PO) Take 5 mg by mouth 3 (three) times a day 1 tablet TID   • polyethylene glycol (GLYCOLAX) 17 GM/SCOOP powder DISSOLVE 17GM (1 CAPFUL) IN 8 OZ FLUIDS AND CONSUME BY MOUTH EVERY MORNING *HOLD 1 DAY FOR LOOSE STOOLS* (CONSTIPATION)   • simvastatin (ZOCOR) 20 mg tablet Take 1 tablet (20 mg total) by mouth daily with dinner at 4 pm for hyperlipidemia   • Starch, Thickening, POWD Take by mouth daily Use on food and liquid as directed   • traZODone (DESYREL) 100 mg tablet Take 1 tablet (100 mg total) by mouth daily at  8 pm for depression   • Vitamins A & D (VITAMIN A & D) ointment Apply topically as needed for skin irritation   • [DISCONTINUED] acetaminophen (TYLENOL) 160 mg/5 mL liquid Take 10 mL (320 mg total) by mouth every 4 (four) hours as needed for mild pain or fever (Fevers greater than 100 4 F)   • denosumab (PROLIA) 60 mg/mL Inject 1 mL (60 mg total) under the skin once for 1 dose   • ferrous sulfate 324 (65 Fe) mg Take 1 tablet by mouth 2 (two) times a day   • omeprazole (PriLOSEC) 20 mg delayed release capsule Take 1 capsule (20 mg total) by mouth 2 (two) times a day before meals   • valACYclovir (VALTREX) 1,000 mg tablet Take 1 tablet (1,000 mg total) by mouth 2 (two) times a day for 7 days       Objective     /74   Pulse 71   Temp (!) 97 2 °F (36 2 °C)   LMP 11/29/2009 (Exact Date)     Physical Exam  Constitutional:       General: She is not in acute distress  Appearance: She is not ill-appearing or diaphoretic  HENT:      Head: Normocephalic and atraumatic  Right Ear: External ear normal       Left Ear: External ear normal    Eyes:      Conjunctiva/sclera: Conjunctivae normal    Cardiovascular:      Rate and Rhythm: Normal rate  Pulmonary:      Effort: Pulmonary effort is normal  No respiratory distress  Abdominal:      General: Bowel sounds are normal       Palpations: Abdomen is soft  Musculoskeletal:         General: Tenderness (on right hand and wrist, poorly localized) and deformity (Right hand spastic contracture) present  No swelling or signs of injury  Right lower leg: No edema  Left lower leg: No edema  Skin:     General: Skin is warm  Coloration: Skin is not pale  Findings: No bruising, erythema, lesion or rash         Karen Triana MD

## 2023-05-12 NOTE — ASSESSMENT & PLAN NOTE
Noted yesterday but has been stiffer than usual over the last week  Otherwise, at baseline  No changes in other behavior  On exam, ROM intact but right hand/wrist is likely tender given patient's reaction when manipulated, sensation is intact wo erythema or obvious swelling  Hand is contracted and feels stiff  Able to open hand passively  Suspicious for worsening spastic contracture of right hand, unlikely CVA  Will check XR right hand & wrist   Given discomfort concerning for tenderness, will do short course of Tylenol 975 mg TID x5 days    Advised to f/u with neurology, may need MRI but uncertain at this time

## 2023-05-12 NOTE — TELEPHONE ENCOUNTER
She was previously on Sinemet IR 25/100 1 5 tab TID then changed to Sinemet CR 25/100mg 2 tabs TID  This may lead to a reduced level of levodopa  We could transition her back to her prior Sinemet dosing and see if that improves her dystonia

## 2023-05-12 NOTE — TELEPHONE ENCOUNTER
Juancarlos Collins ( caregiver ) back  Jocy Led states since yesterday patient's Right Hand, her wrist is bending in  Fingers are clenched  Patient can not open them without assist from caregivers  Patient is moving arm otherwise  Left arm is fine  Staff said patient's affect is normal    Patient's PCP ordered Xrays of R hand  They were just done this afternoon  Results are pending  CMP was also ordered  Results pending  Patient had Shingles on 4/24/23, finished 7 day course of Valtrex  No other changes in medications or other recent illness  LOV 4/14/23 with Dr Cesar Gallo  Next appt 8/11/23  Please advise    CB# [phone] Jocy Led

## 2023-05-12 NOTE — TELEPHONE ENCOUNTER
Jeannie Cosby is called asking for a call back because the patient is having difficulty with her right wrist and hand  She advised that the hand has not been able to open her right hand since yesterday      # [phone]

## 2023-05-15 NOTE — TELEPHONE ENCOUNTER
Called re: below and spoke with pt's caregiver, Nayeli Becerramigel, who reports that they don't want to change the Sinemet CR at this time  They are waiting to see what the X-ray report says  Nayeli Crook did request perhaps being seen earlier than 8/11  She also wanted to discuss the possibility of an MRI

## 2023-05-15 NOTE — TELEPHONE ENCOUNTER
Ryan - please review Dr Thea Velez schedule  If there are any cancellations please offer a sooner appointment

## 2023-05-15 NOTE — TELEPHONE ENCOUNTER
Add on - 05/16/2023  Patient's caregiver Marin Pradhan  has called back and accepted 05/16/2023  CTR VL, 1:30 pm With Olena Ackerman       E-verified    Thank you,     Margarita Recinos

## 2023-05-15 NOTE — TELEPHONE ENCOUNTER
Attempted to reach pt caregiver to offer earlier appt on 04/16 at 1:30 PM with Nash Dye   [facility]   Provided call back number and instructed pt care giver to call back if interested in earlier appt

## 2023-05-16 ENCOUNTER — OFFICE VISIT (OUTPATIENT)
Dept: NEUROLOGY | Facility: CLINIC | Age: 63
End: 2023-05-16

## 2023-05-16 VITALS — SYSTOLIC BLOOD PRESSURE: 112 MMHG | HEART RATE: 75 BPM | TEMPERATURE: 98 F | DIASTOLIC BLOOD PRESSURE: 69 MMHG

## 2023-05-16 DIAGNOSIS — G80.3 DYSTONIC CEREBRAL PALSY (HCC): ICD-10-CM

## 2023-05-16 DIAGNOSIS — G24.9 DYSTONIA: ICD-10-CM

## 2023-05-16 DIAGNOSIS — G20 PARKINSONISM, UNSPECIFIED PARKINSONISM TYPE (HCC): Primary | ICD-10-CM

## 2023-05-16 DIAGNOSIS — G82.50 SPASTIC QUADRIPARESIS (HCC): ICD-10-CM

## 2023-05-16 DIAGNOSIS — G80.8 MIXED CEREBRAL PALSY (HCC): ICD-10-CM

## 2023-05-16 DIAGNOSIS — M21.339: ICD-10-CM

## 2023-05-16 NOTE — PROGRESS NOTES
Patient ID: Marni Bear is a 63 y o  female  Assessment/Plan:    Parkinsonism (HCC)  Improved on sinemet no change in symptoms with switch to sinemet CR  mg 2 tabs BID  She will continue current dose  Most recent symptom occurred 2 weeks after change in medication so likely unrelated  Dystonic cerebral palsy [facility])  Patient presents today with report of sudden onset of difficulty with right wrist/hand  Patient had severe wrist and finger flexion in fixed position in which which staff was unable to open her hand or move her wrist  Over the past few days they have been above to provide passive ROM to the wrist and fingers  Patient has been unable to actively extend the wrist or fingers since onset  On exam she was unable to hold the wrist in extension-?wrist drop  At times there was some increased tone in the wrist and fingers and was not able passively extend the wrist or fingers and would encounter resistance  Patient did not appear in any discomfort  There is also a question if she was having increased difficulty with coordination of the right arm, exam unfortunately is limited  Due to questionable wrist drop will obtain EMG, staff had stated PCP had recommend brain and cervical spine Mri which is reasonable as well  I do question if this is part of her dystonia and did suggest changes to her baclofen or sinemet however staff would like to hold off today  Trihexyphenidyl can be used for dystonia as well, however would be cautious given potential side effects  She will continue follow up with PM&R for botox injections  Plan for follow up in 4 months  To contact the office sooner with any concerns or worsening symptoms           Diagnoses and all orders for this visit:    Parkinsonism, unspecified Parkinsonism type ([facility] [address] )    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HCC)    Dystonia    Wrist drop  -     MRI brain without contrast; Future  -     MRI cervical spine wo contrast; Future  -     EMG 1 Limb; "Future    Mixed cerebral palsy (HCC)    Dystonic cerebral palsy (HCC)           Subjective:    HPI    Oscar Grayson is a 63 y o  female with a PMH of intellectual disability 2/2 cerebral palsy, diabetes, and dysphagia presenting for follow up            To review, she has followed with our epilepsy team since 2016 for history of seizure like episodes   When she was seen in 10/2021  her largest concern was increased tremors, and gait changes, possible \"freezing\" Her exam did show significant spasticity  Symptoms worsening over the past few months  She was started on a trial of sinemet due to worsening parkinsonain features, she was referred to PM&R due to spasticity   Did see PM&R for inital consult 4/2022 in which there was a question of dystonia vs spasticity, plan was for possible botox injections  last office visit 4/2023 in which her Sinemet was changed to CR due to day program attendance  she continues to follow with PM&R for her dystonia/spasticity-receiving Botox injections, is also maintained on baclofen  Interval history:  She presents today with staff from [facility] who assists with hx  On 5/11 she started with symptoms of flexed wrist and fingers-staff was unable to move her wrist or open her fingers  The week prior to that she was able to  pegs and blocks and manipulate them  With onset of this she has difficulty picking up items as she was unable to open her finger  They deny any weakness in the arm, or difficulty with coordination in the arm, or other stroke like symptoms  They did reach out to the office due to worsening contracture right hand, noted to have been stiffer than usual over the past week and is unable to open the hand   She did obtain x-rays of the wrist and hand which were largely normal  She was placed on tylenol TID  She did have shingles 4/24-was treated with valtrex-on abdomen and right side of her neck     No injury to the hand or the wrist that " staff is aware of  Her sinemet was changed to CR 4/15-has not noted any change in her symptoms since then  Her PCP did suggest head imaging but didn't order  Symptoms are slightly improving  No other recent medication changes  The following portions of the patient's history were reviewed and updated as appropriate: allergies, current medications, past family history, past medical history, past social history, past surgical history and problem list           Objective:    Blood pressure 112/69, pulse 75, temperature 98 °F (36 7 °C), temperature source Temporal, last menstrual period 11/29/2009, not currently breastfeeding  Physical Exam  Constitutional:       General: She is awake  Eyes:      General: Lids are normal       Extraocular Movements: Extraocular movements intact  Pupils: Pupils are equal, round, and reactive to light  Neurological:      Mental Status: She is alert  Deep Tendon Reflexes: Reflexes are normal and symmetric  Neurological Exam  Mental Status  Awake and alert  Speech: nonverbal  Follows one-step commands  Cranial Nerves  CN III, IV, VI: Extraocular movements intact bilaterally  Normal lids and orbits bilaterally  Pupils equal round and reactive to light bilaterally  CN V: Facial sensation is normal   CN VII: Full and symmetric facial movement  CN VIII: Hearing is normal   CN XI: Shoulder shrug strength is normal   CN XII: Tongue midline without atrophy or fasciculations  Motor   Increased muscle tone  Does not follow command but appears to move all extremities symmetrically, on the RUE she was unable to extend the wrist  Or extend the fingers  Fingers were in flexed state and had difficulty with passive extension       Sensory  Light touch is normal in upper and lower extremities  Reflexes  Deep tendon reflexes are 2+ and symmetric in all four extremities      Coordination    Occasional resting tremor in left hand, no postural tremor,  Unable to perform CARLYLE but overall mild bradykinesia  She did have spasticity in the UE along with toritcollis  Her left wrist was contracture with no change in positioning/posture  No abnormal movements of the feet       Gait    Patient presented in wheel chair, not ambulated due to safety concerns         I have personally reviewed the ROS performed by the MA     ROS:    Review of Systems   Constitutional: Negative  Negative for appetite change and fever  HENT: Negative  Negative for hearing loss, tinnitus, trouble swallowing and voice change  Eyes: Negative  Negative for photophobia, pain and visual disturbance  Respiratory: Negative  Negative for shortness of breath  Cardiovascular: Negative  Negative for palpitations  Gastrointestinal: Negative  Negative for nausea and vomiting  Endocrine: Negative  Negative for cold intolerance  Genitourinary: Negative  Negative for dysuria, frequency and urgency  Musculoskeletal: Negative  Negative for gait problem, myalgias and neck pain  Right wrist - stiffness, muscle tightness   Skin: Negative  Negative for rash  Allergic/Immunologic: Negative  Neurological: Negative  Negative for dizziness, tremors, seizures, syncope, facial asymmetry, speech difficulty, weakness, light-headedness, numbness and headaches  Hematological: Negative  Does not bruise/bleed easily  Psychiatric/Behavioral: Negative  Negative for confusion, hallucinations and sleep disturbance  All other systems reviewed and are negative

## 2023-05-17 ENCOUNTER — OFFICE VISIT (OUTPATIENT)
Dept: FAMILY MEDICINE CLINIC | Facility: CLINIC | Age: 63
End: 2023-05-17

## 2023-05-17 VITALS
DIASTOLIC BLOOD PRESSURE: 89 MMHG | TEMPERATURE: 97.5 F | HEART RATE: 64 BPM | OXYGEN SATURATION: 98 % | SYSTOLIC BLOOD PRESSURE: 135 MMHG

## 2023-05-17 DIAGNOSIS — M24.541 CONTRACTURE OF RIGHT HAND: Primary | ICD-10-CM

## 2023-05-17 NOTE — ASSESSMENT & PLAN NOTE
Patient presents today with report of sudden onset of difficulty with right wrist/hand  Patient had severe wrist and finger flexion in fixed position in which which staff was unable to open her hand or move her wrist  Over the past few days they have been above to provide passive ROM to the wrist and fingers  Patient has been unable to actively extend the wrist or fingers since onset  On exam she was unable to hold the wrist in extension-?wrist drop  At times there was some increased tone in the wrist and fingers and was not able passively extend the wrist or fingers and would encounter resistance  Patient did not appear in any discomfort  There is also a question if she was having increased difficulty with coordination of the right arm, exam unfortunately is limited  Due to questionable wrist drop will obtain EMG, staff had stated PCP had recommend brain and cervical spine Mri which is reasonable as well  I do question if this is part of her dystonia and did suggest changes to her baclofen or sinemet however staff would like to hold off today  Trihexyphenidyl can be used for dystonia as well, however would be cautious given potential side effects  She will continue follow up with PM&R for botox injections  Plan for follow up in 4 months  To contact the office sooner with any concerns or worsening symptoms

## 2023-05-17 NOTE — ASSESSMENT & PLAN NOTE
Improved on sinemet no change in symptoms with switch to sinemet CR  mg 2 tabs BID  She will continue current dose  Most recent symptom occurred 2 weeks after change in medication so likely unrelated

## 2023-05-17 NOTE — PROGRESS NOTES
Name: Ottoniel Rosa      : 1960      MRN: [id number]  Encounter Provider: Garry Solitario DO  Encounter Date: 2023   Encounter department: [address] [place] [address]  Contracture of right hand  -     Ambulatory referral to PT/OT hand therapy; Future  -     Brace    Patient presents for follow-up of right hand contracture versus spasm of the flexor apparatus  Review of recent x-rays shows no acute osseous abnormalities nor significant degenerative arthritis  Patient has been following with neurology and physiatry in the past for similar issues but her caregiver states these have worsened recently  At this time, we do recommend continued follow-up with neurology and physiatry  Recommend continuation of as needed analgesics  We will also recommend a trial of formal hand therapy  We will also prescribe wrist brace to be utilized as tolerated, particularly at night  We will follow-up with patient in roughly 6 weeks time  Recommend obtaining imaging and EMG as outlined in neurology recommendations dated yesterday  Patient and her caregiver endorsed understanding and acceptance of the above plan  Subjective      Ottoniel Rosa is a 63 y o  female with a extensive past medical history as previously described here for follow-up regarding right hand contracture and discomfort  She is present today along with her charge nurse from her group home  Her charge nurse tells me that the patient was evaluated yesterday by a neurology movement specialist, though records indicate those notes are incomplete at this time  She states that the patient's contracture has worsened over the last 7 to 10 days  She states that yesterday, during the neurology appointment in the afternoon, the patient's hand was significantly flexed from the wrist down to the fingers with difficulty when her caregiver or the neurologist passively attempted to extend    Her caregiver tells me that today, the symptoms are slightly decreased and the patient's ability to be passively extended is increased  The caregiver also states that it appears as though the patient is slightly less uncomfortable and there is less appreciable swelling of the wrist since initiating Tylenol as mentioned in last office note  Review of Systems   Constitutional: Negative for chills, fatigue and fever  HENT: Negative for congestion, postnasal drip, rhinorrhea, sinus pressure, sinus pain and sore throat  Eyes: Negative for visual disturbance  Respiratory: Negative for cough, shortness of breath and wheezing  Cardiovascular: Negative for chest pain and palpitations  Gastrointestinal: Negative for abdominal pain, constipation, diarrhea, nausea and vomiting  Genitourinary: Negative for difficulty urinating, dysuria, frequency and urgency  Skin: Negative for pallor, rash and wound  Neurological: Negative for dizziness, seizures, syncope, weakness, light-headedness, numbness and headaches          Right hand contracture       Current Outpatient Medications on File Prior to Visit   Medication Sig   • acetaminophen (TYLENOL) 160 mg/5 mL liquid Take 30 5 mL (975 mg total) by mouth 3 (three) times a day for 5 days   • ARIPiprazole (ABILIFY) 2 mg tablet Take 1 tablet by mouth daily at bedtime   • baclofen 10 mg tablet Take 1 tablet (10 mg total) by mouth 3 (three) times a day   • benzonatate (TESSALON PERLES) 100 mg capsule TAKE 1 CAPSULE BY MOUTH EVERY 8 HOURS AS NEEDED FOR COUGH **DO NOT CRUSH**   • Botox 100 units INJECT UP  UNITS IN THE MUSCLE INTO LOWER AND UPPER EXTREMITIES ONCE EVERY 3 MONTHS   • calcium carbonate (OS-MORRIS) 600 MG tablet Take 1 tablet (600 mg total) by mouth 2 (two) times a day with meals   • carbamide peroxide (DEBROX) 6 5 % otic solution 2 drops 2 drops in the affected ear prn x 5 days   • carbidopa-levodopa (SINEMET CR)  mg TBCR per ER tablet Take 2 tablets by mouth 2 (two) times a day Take morning dose 7 Am and afternoon dose at 4 PM   • carboxymethylcellulose (REFRESH PLUS) 0 5 % SOLN 1 drop 3 (three) times a day as needed for dry eyes   • Cholecalciferol (Vitamin D3) 50 MCG (2000 UT) TABS    • citalopram (CeleXA) 40 mg tablet Take 1 tablet by mouth daily   • denosumab (PROLIA) 60 mg/mL Inject 1 mL (60 mg total) under the skin once for 1 dose   • DIABETIC TUSSIN  MG/5ML oral liquid TAKE 2 TEASPOONSFUL (10ML) BY MOUTH EVERY 4 HOURS AS NEEDED FOR COUGH & CONGESTION   • divalproex sodium (DEPAKOTE SPRINKLE) 125 MG capsule 4 capsules 2 (two) times a day 4 caps BID   • docosanol (ABREVA) 10 % Apply topically 5 (five) times a day Apply and rub in 5x a day until healed as needed for lesion   • ferrous sulfate 324 (65 Fe) mg Take 1 tablet by mouth 2 (two) times a day   • fexofenadine (ALLEGRA) 30 MG/5ML suspension Take 30 mL (180 mg total) by mouth daily as needed (seasonal allergies)   • fluticasone (FLONASE) 50 mcg/act nasal spray 1 spray into each nostril daily as needed for rhinitis or allergies   • glimepiride (AMARYL) 1 mg tablet Take 1 tablet (1 mg total) by mouth daily with breakfast   • hydrocortisone 1 % cream Apply topically to affected area twice daily as needed for rash   • levothyroxine (SYNTHROID) 25 mcg tablet Take 1 tablet (25 mcg) by mouth every morning   • linaGLIPtin (Tradjenta) 5 MG TABS Take 5 mg by mouth daily (Patient not taking: Reported on 5/16/2023)   • LORazepam (ATIVAN) 0 5 mg tablet Take 0 5 mg by mouth daily Every day at 8pm   • metFORMIN (GLUCOPHAGE) 1000 MG tablet Take 1 tablet (1,000 mg total) by mouth 2 (two) times a day with meals   • neomycin-bacitracin-polymyxin b (NEOSPORIN) ointment Apply 1 application topically 2 (two) times a day as needed Apply to affected area BID PRN   • nitrofurantoin (MACRODANTIN) 50 mg capsule Take 1 capsule by mouth daily at bedtime   • olopatadine HCl (PATADAY) 0 2 % opth drops Administer 1 drop to both eyes as needed Instill 1 drop into affected eyes daily PRN for eye redness   • omeprazole (PriLOSEC) 20 mg delayed release capsule Take 1 capsule (20 mg total) by mouth 2 (two) times a day before meals   • oxybutynin (DITROPAN) 5 mg tablet  (Patient not taking: Reported on 5/16/2023)   • Oxybutynin Chloride (DITROPAN XL PO) Take 5 mg by mouth 3 (three) times a day 1 tablet TID   • polyethylene glycol (GLYCOLAX) 17 GM/SCOOP powder DISSOLVE 17GM (1 CAPFUL) IN 8 OZ FLUIDS AND CONSUME BY MOUTH EVERY MORNING *HOLD 1 DAY FOR LOOSE STOOLS* (CONSTIPATION)   • simvastatin (ZOCOR) 20 mg tablet Take 1 tablet (20 mg total) by mouth daily with dinner at 4 pm for hyperlipidemia   • Starch, Thickening, POWD Take by mouth daily Use on food and liquid as directed   • traZODone (DESYREL) 100 mg tablet Take 1 tablet (100 mg total) by mouth daily at  8 pm for depression   • valACYclovir (VALTREX) 1,000 mg tablet Take 1 tablet (1,000 mg total) by mouth 2 (two) times a day for 7 days (Patient not taking: Reported on 5/16/2023)   • Vitamins A & D (VITAMIN A & D) ointment Apply topically as needed for skin irritation       Objective     /89 (BP Location: Right arm, Patient Position: Sitting, Cuff Size: Standard)   Pulse 64   Temp 97 5 °F (36 4 °C) (Temporal)   LMP 11/29/2009 (Exact Date)   SpO2 98%     Physical Exam  Vitals and nursing note reviewed  Constitutional:       General: She is not in acute distress  Appearance: Normal appearance  She is normal weight  She is not ill-appearing, toxic-appearing or diaphoretic  HENT:      Head: Normocephalic and atraumatic  Eyes:      General: No scleral icterus  Conjunctiva/sclera: Conjunctivae normal       Pupils: Pupils are equal, round, and reactive to light  Cardiovascular:      Rate and Rhythm: Normal rate and regular rhythm  Pulses: Normal pulses  Heart sounds: Normal heart sounds  No murmur heard  No friction rub  No gallop     Pulmonary:      Effort: Pulmonary effort is normal  No respiratory distress  Breath sounds: Normal breath sounds  No stridor  No wheezing, rhonchi or rales  Abdominal:      General: Abdomen is flat  Bowel sounds are normal  There is no distension  Palpations: Abdomen is soft  There is no mass  Tenderness: There is no abdominal tenderness  There is no right CVA tenderness, left CVA tenderness, guarding or rebound  Hernia: No hernia is present  Musculoskeletal:         General: No swelling or tenderness  Cervical back: Normal range of motion  Lymphadenopathy:      Cervical: No cervical adenopathy  Skin:     General: Skin is warm and dry  Capillary Refill: Capillary refill takes less than 2 seconds  Coloration: Skin is not pale  Findings: No erythema or rash  Neurological:      General: No focal deficit present  Mental Status: She is alert and oriented to person, place, and time  Cranial Nerves: No cranial nerve deficit  Sensory: No sensory deficit  Motor: Abnormal muscle tone present  No weakness  Comments: Contracture of right hand and wrist   Patient is unable to actively extend her fingers or her wrist   Patient demonstrates full passive range of motion of the fingers and wrist of the right side  Psychiatric:         Mood and Affect: Mood normal          Behavior: Behavior normal          Thought Content:  Thought content normal          Judgment: Judgment normal        Kulwinder Blinks, DO

## 2023-05-18 ENCOUNTER — TELEPHONE (OUTPATIENT)
Dept: LAB | Facility: HOSPITAL | Age: 63
End: 2023-05-18

## 2023-05-19 ENCOUNTER — TELEPHONE (OUTPATIENT)
Dept: FAMILY MEDICINE CLINIC | Facility: CLINIC | Age: 63
End: 2023-05-19

## 2023-05-19 DIAGNOSIS — M81.0 OSTEOPOROSIS, UNSPECIFIED OSTEOPOROSIS TYPE, UNSPECIFIED PATHOLOGICAL FRACTURE PRESENCE: Primary | ICD-10-CM

## 2023-05-19 NOTE — TELEPHONE ENCOUNTER
Santhosh Chung is calling needing a Pre and Post BMP for Prolea  for the patient      Santhosh Chung can be reached at [phone]

## 2023-05-30 PROBLEM — Z11.1 SCREENING-PULMONARY TB: Status: RESOLVED | Noted: 2023-03-31 | Resolved: 2023-05-30

## 2023-05-31 ENCOUNTER — HOSPITAL ENCOUNTER (OUTPATIENT)
Dept: RADIOLOGY | Age: 63
Discharge: HOME | End: 2023-05-31

## 2023-05-31 DIAGNOSIS — M21.339: ICD-10-CM

## 2023-06-01 ENCOUNTER — EVALUATION (OUTPATIENT)
Dept: OCCUPATIONAL THERAPY | Facility: CLINIC | Age: 63
End: 2023-06-01

## 2023-06-01 DIAGNOSIS — M24.541 CONTRACTURE, RIGHT HAND: Primary | ICD-10-CM

## 2023-06-01 NOTE — PROGRESS NOTES
OT Evaluation     Today's date: 2023  Patient name: Lenora Taylor  : 1960  MRN: [id number]  Referring provider: Supriya Urias DO  Dx:   Encounter Diagnosis     ICD-10-CM    1  Contracture, right hand  M24 541                      Assessment  Assessment details: Patient presents with a contracture of the R wrist and digits  Patient has a prior dx of Parkinson's and cerebral palsy  Patient caregiver provided hx as patient is unable too  Patient R hand contracture began roughly on May 11th  Prior to this patient was able to complete fine motor tasks with the RUE  Patient presents with high tone in the RUE that is causing a contracture of the wrist and digits  Patient has full PROM of the digits but cannot actively extend the wrist or digits  Therapist was unable to access /pinch strength  Patient has edema present in the 1st digit and wrist  Therapist fabricated a custom resting hand splint to place the patients wrist and digits into extension  Therapist educated caregiver on wear schedule of full time  Patient had an MRI and is awaiting results, EMG scheduled  Therapist referred patient to a neuro clinic as symptoms appear to be neurological in origin  See below for a more detailed assessment  Impairments: abnormal coordination, abnormal or restricted ROM, activity intolerance, impaired physical strength and pain with function    Symptom irritability: highUnderstanding of Dx/Px/POC: poor   Prognosis: poor    Plan  Plan details: Patient presenting to OP OT services due to a contracture of the R wrist and digits  Patient has a prior hx of cerebral palsy and Parkinson's  Therapist referred patient to a neuro OT clinic as the contracture appears to be neurological in origin     Patient would benefit from: custom splinting, skilled occupational therapy and OT eval  Referral necessary: Yes  Planned therapy interventions: IADL retraining, patient education, self care, manual therapy, orthotic "fitting/training, strengthening, stretching, therapeutic activities, therapeutic exercise, home exercise program, functional ROM exercises and fine motor coordination training  Treatment plan discussed with: patient        Subjective Evaluation    History of Present Illness  Date of onset: 2023  Mechanism of injury: Mechansm: Unknown    Subjective from caregiver:  \"Patient prior to the contracture was able to  blocks and pegs and place them in a board\"             Recurrent probem    Quality of life: fair    Pain  Current pain ratin  At best pain ratin  At worst pain ratin  Progression: no change    Social Support    Employment status: not working  Hand dominance: right      Diagnostic Tests    FCE comments: MRI Completed on 23  EMG in   Current treatment: occupational therapy        Objective     Observations     Additional Observation Details  Contracture of R wrist and all digits    Active Range of Motion     Left Wrist   Wrist flexion: 75 degrees   Wrist extension: 10 degrees     Right Wrist   Wrist flexion: 75 degrees   Wrist extension: -75 degrees     Swelling     Left Wrist/Hand   Thumb     Proximal: 5 7 cm  Circumference wrist: 14 cm    Right Wrist/Hand   Thumb     Proximal: 6 5 cm  Circumference wrist: 16 cm             Precautions: Cerebral Palsy, Parkinson's         "

## 2023-06-05 ENCOUNTER — EVALUATION (OUTPATIENT)
Dept: OCCUPATIONAL THERAPY | Facility: CLINIC | Age: 63
End: 2023-06-05
Payer: MEDICARE

## 2023-06-05 DIAGNOSIS — G80.3 DYSTONIC CEREBRAL PALSY (HCC): ICD-10-CM

## 2023-06-05 DIAGNOSIS — G20 PARKINSON'S DISEASE (HCC): Primary | ICD-10-CM

## 2023-06-05 PROCEDURE — 97166 OT EVAL MOD COMPLEX 45 MIN: CPT

## 2023-06-05 NOTE — PROGRESS NOTES
"OCCUPATIONAL THERAPY INITIAL EVALUATION:    2023  rBee Atkins  1960  [id number]  Hoang Scanlon DO  1  Parkinson's disease ([facility] )    2  Cerebral palsy, unspecified type ([facility] )        Subjective \"Hi\"     *patient has verbal language limitations*    PATIENT GOAL: Pt unable to state goal      Assessment/Plan    Skilled Analysis:  Pt is a 63 y o  female referred to Occupational Therapy s/p Parkinson's disease ([facility] ) Corwin Barahona  Pt participated in skilled OT evaluation and following formalized testing, presents with the following areas of deficit: FMC/FMS, GMC/GMS, hand to target accuracy, in hand manipulation/dexterity and apraxia impacting indep and completion of ADL/IADL, salient, and leisure tasks  Pt does demo the need for skilled Occupational Therapy services 2x/week for 8 weeks with focus on UE NMR, UE strengthening, UE endurance, FMC/GMC, FMS/GMS and leisure pursuits to address the goals as listed below  Pt in agreement with POC, POC to  w/in 60 days  Duration in weeks: 8  Plan of care beginning date: 23  Plan of care expiration date: 23      Goals:   Motor Short Term Goals (4 weeks) Expiration Date: 23      Strength/Endurance    · Pt will demo with G tolerance to supine, seated, and in stance exercise x 10 minutes with minimal rest breaks required for increased engagement in life roles and weekly exercise regimen     · Pt will demo with G carryover of Home Exercise Program to improve functional progression towards goals in Plan of care and for improved functional use of RUE         FMC/GMC    · Pt will increase BUE rate of manipulation by 3% for all FM tests for improved functional performance with salient tasks     · Pt will increase Reaction time  to < 2 seconds with hand to target timed trials for improved reaction time and automaticity of coordination for improved functional performance with ADLs/IADLs and salient tasks    · Pt will increase RUE prehension patterns for improved " utensil and container management  with <50% droppage     · Pt will demo with decreased BUE ataxia with functional reach for normalized movement pattern of  RUE and for improved hand to target accuracy x 30%    · Pt will demo improved motor learning of constructional and new motor actions for improved b/l coordination and motor planning evident by 30% accuracy for fxnl ADL/IADL performance        Functional Performance    · Pt will increase RUE to independent stabilizer for ADL/IADL and tabletop tasks for improved functional performance of life roles and salient tasks     · Pt will demo G comprehension of adaptive equipment (long handled reacher/sponge/shoehorn, toileting aid) use in clinic to improve independence in ADL management in home environment       AROM/PROM    · Pt will be able to perform full range of proximal RUE to demo increased strength and ROM of affected UE for improved ADLs/IADLs    · Pt will be able to perform 3/4 range of distal RUE to demo increased strength and ROM of affected UE for improved ADLs/IADLs    · Pt will demo good carryover of clinic and home tone reduction strategies for improved AROM initiation with functional reach with ADL fxn      Modalities    · Pt will tolerate BIONESS/NMES/IASTM for improved motor and sensory performance for overall improved strength, tone reduction, and sensation to inc safety and engagement with ADL/IADL fxn    · Pt/family will demo G understanding and carryover of use of home NMES unit as prescribed to inc carryover of compensatory strategies of RUE    · Pt will increase compliance with [facility] for improved elasticity of RUE and tolerate fit of resting hand  splint with wear time of 2 hrs       Goals:   Motor Long Term Goals (8 weeks) Expiration Date: 8/5/23      Strength/Endurance    · Pt will demo with G tolerance to supine, seated, and in stance exercise x 20 minutes with minimal rest breaks required for increased engagement in life roles and weekly exercise regimen     · Pt will demo with G carryover of Home Exercise Program to improve functional progression towards goals in Plan of care and for improved functional use of RUE         FMC/GMC    · Pt will increase BUE rate of manipulation by 5% for all FM tests for improved functional performance with salient tasks     · Pt will increase Reaction time  to < 1 7 seconds with hand to target timed trials for improved reaction time and automaticity of coordination for improved functional performance with ADLs/IADLs and salient tasks    · Pt will increase RUE prehension patterns for improved utensil and container management  with <30% droppage     · Pt will demo with decreased BUE ataxia with functional reach for normalized movement pattern of  RUE and for improved hand to target accuracy x 50%    · Pt will demo improved motor learning of constructional and new motor actions for improved b/l coordination and motor planning evident by 50% accuracy for fxnl ADL/IADL performance        Functional Performance    · Pt will increase RUE to gross assist for ADL/IADL and tabletop tasks for improved functional performance of life roles and salient tasks     · Pt will demo G comprehension of adaptive equipment (long handled reacher/sponge/shoehorn, toileting aid) use in clinic to improve independence in ADL management in home environment       AROM/PROM    · Pt will be able to perform full range of proximal RUE to demo increased strength and ROM of affected UE for improved ADLs/IADLs    · Pt will be able to perform full range of distal RUE to demo increased strength and ROM of affected UE for improved ADLs/IADLs    · Pt will demo good carryover of clinic and home tone reduction strategies for improved AROM initiation with functional reach with ADL fxn      Modalities    · Pt will tolerate BIONESS/NMES/IASTM for improved motor and sensory performance for overall improved strength, tone reduction, and sensation to inc safety "and engagement with ADL/IADL fxn    · Pt/family will demo G understanding and carryover of use of home NMES unit as prescribed to inc carryover of compensatory strategies of RUE    Pt will increase compliance with [facility] for improved elasticity of RUE and tolerate fit of resting hand  splint with wear time of 4 hrs      Treatment Interventions  Supine, seated, and in stance neuro re-ed  NMES/FES for strengthening  NMES for tone reduction  Fxnl Grasp and Release  FMC/prehension patterns  Fxnl Tool use (tweezers, tongs)  In hand manipulation  [employer] [facility])  Timed Trials to improve automaticity  Manual tx (IASTM if able, PROM/stretching)  Hand to target tasks  Sensory re-ed as needed (sensory bins)  Seated functional reach: crossing midline  Supine place and hold  WBearing strategies  (seated, Prone/quad)  Closed chain activities  Open chain activities  RHS fabrication      HISTORY OF PRESENT ILLNESS:     Ryan is a 63 y o  female who was referred to Occupational Therapy s/p  Parkinson's disease ([facility] ) Lavonne Streeter  Per chart review, Durga Tsai is a 63 y o  female with a PMH of intellectual disability 2/2 cerebral palsy, diabetes, and dysphagia presenting for follow up      To review, she has followed with our epilepsy team since 2016 for history of seizure like episodes   When she was seen in 10/2021  her largest concern was increased tremors, and gait changes, possible \"freezing\" Her exam did show significant spasticity  Symptoms worsening over the past few months  She was started on a trial of sinemet due to worsening parkinsonain features, she was referred to PM&R due to spasticity   Did see PM&R for inital consult 4/2022 in which there was a question of dystonia vs spasticity, plan was for possible botox injections       last office visit 4/2023 in which her Sinemet was changed to CR due to day program attendance    she continues to follow with PM&R for her dystonia/spasticity-receiving Botox injections, is " "also maintained on baclofen  Pt present for OP OT eval with Elena Bro, her charge nurse  Per Lisa's report, pt has dysphagia (honey thick puree diet)  Pt ambulates with a manual wheelchair, and is unable to IND'ly ambulate  Pt does not talk much, so for her to say \"hi\" to OTR at the start of the evaluation was \"a lot\" according to Elena Bro  Pt is experiencing an increase in tremors that was first observed a few years ago  As of 5/10/23, pt demonstrates an inability to put pegs into a board and color  Elena Bro reports a contracture in the R dominant hand at this time  Pt lives in community-based group home  She receives 24/7 care  Pt previously exhibited \"a lot\" of behaviors, according to Elena Bro  Pt in TOTAL A in all BADLs  Pt likes country music and [place] music, likes to [employer] and build blocks, likes to watch cartoons ([place] and Keiko) and musicals  Additionally, pt likes to throw ball, loves balloons, and likes tissues, pieces of paper, and post it notes  Per Lisa's report, when pt previously had PT, she was seen by a pediatric PT to incorporate pediatric principles to increase motivation  It is recommended that treating therapist incorporate pt's above listed interests to increase engagement, motivation, and participation in therapy       PMH:   Past Medical History:   Diagnosis Date   • Adjustment disorder    • Altered mental status 12/11/2015   • Anemia    • Bipolar 1 disorder ([facility] )    • Cerebral palsy ([facility] )    • Chronic hypernatremia 02/06/2016   • Closed fracture of left hip ([facility] ) 01/19/2016   • Closed left hip fracture (HCC)     no surgery   • Constipation    • Dehydration 02/20/2016   • Developmental delay    • Diabetes mellitus ([facility] )    • Difficulty walking    • Disease of thyroid gland    • Diverticulosis    • Fracture of multiple ribs of right side    • Hip fracture ([facility] ) 07/26/2015    left   • Hyperlipidemia    • Hypernatremia 12/28/2018   • Hypotension    • Impulse control disorder    • " Incontinence    • Intellectual disability due to developmental disorder, unspecified    • Microalbuminuria    • Neuropathy in diabetes [facility])    • Osteopathia    • Osteoporosis    • Peripheral neuropathy    • 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HCC)    • Thrombocytopenia ([facility]) 2015       Pain Levels:     Restin    With Activity:  0- does not like when charge nurse stretches fingers    Objective    Impairment Observations:    Upper Extremity       RUE LUE Comments                 UPPER EXTREMITY FXN IMPAIRED INTACT Dominant Hand: R                         /Pinch Strength         Dynamometer      - Gross Grasp 0 lbs 0 lbs low; able to grasp handle, unable to squeeze   Pinch Meter       - PINCER 0 lbs 0 lbs     - TRIPOD 0 lbs 0 lbs     - LATERAL 0 lbs  0 lbs             AROM (seated)   Charge nurse reports pt is able to complete AROM, however pt did not demonstrate during evaluation   Scapular Protraction      Scapular Retraction      Elevation/Depression      Internal Rotation      External Rotation      Shoulder Abduction      Shoulder Adduction      Shoulder Flexion      Shoulder Extension      Horizontal Abduction      Horizontal Adduction      Elbow Flexion      Elbow Extension      Pronation      Supination      Wrist Flexion      Wrist Extension      Digit Flexion      Digit Extension      Composite Grasp      Hook Grasp      Opposition      Subluxation                                            PROM (seated position)         Elevation/Depression Unable to assess Unable to assess    Scapular Protraction Unable to assess Unable to assess    Scapular Retraction Unable to assess Unable to assess    Shoulder Abduction 3/4 full    Shoulder Adduction full full    Shoulder Flexion 3/4 full    Shoulder Extension full full    Horizontal Abduction 1/2 3/4    Horizontal Adduction 1/2 3/4    Elbow Flexion 3/4 full    Elbow Extension 1/2 full    Wrist Flexion 3/4 3/4    Wrist Extension 1/2 3/4    Digit Flexion 1/2 1/2    Digit Extension 1/2 1/2                                   MMT  unable to assess due to pt not able to understand the directions  unable to assess due to pt not able to understand the directions     Elevation      Shoulder Abduction      Shoulder Adduction      Shoulder Flexion      Shoulder Extension      Elbow Flexion      Elbow Extension      Wrist Flexion      Wrist Extension      Gross Grasp            SENSATION      Myofilaments (3 61 Wnl) Unable to assess Unable to assess     Sharp Dull  INTACT INTACT    Proprioception  unable to assess due to pt not able to understand the directions  unable to assess due to pt not able to understand the directions    Hot/Cold Temp INTACT INTACT    COORDINATION      9 Hole Peg Test  was unable to assess due to to pt not engaging in task   attempted to assess, however pt did not participate    Fxnl Dexterity Test Did not assess after lack of participation in 9 hole peg test Did not assess after lack of participation in 9 hole peg test        INTERVENTION COMMENTS:  Diagnosis: Parkinson's disease ([facility] ) [G20]  Precautions: difficulty speaking  FOTO: Not completed  Insurance: Payor: [address] [place] [address],[address] [facility] REP / Plan: Beata Medrano / Product Type: Medicare HMO /   1 of 10 visits, PN due 7/5/23  POC expires: 8/5/23

## 2023-06-14 ENCOUNTER — APPOINTMENT (OUTPATIENT)
Dept: LAB | Facility: HOSPITAL | Age: 63
End: 2023-06-14
Payer: MEDICARE

## 2023-06-14 ENCOUNTER — OFFICE VISIT (OUTPATIENT)
Dept: OCCUPATIONAL THERAPY | Facility: CLINIC | Age: 63
End: 2023-06-14
Payer: MEDICARE

## 2023-06-14 DIAGNOSIS — E83.52 HYPERCALCEMIA: ICD-10-CM

## 2023-06-14 DIAGNOSIS — E11.00 TYPE 2 DIABETES MELLITUS WITH HYPEROSMOLARITY WITHOUT COMA, WITHOUT LONG-TERM CURRENT USE OF INSULIN (HCC): ICD-10-CM

## 2023-06-14 DIAGNOSIS — Z11.1 SCREENING-PULMONARY TB: ICD-10-CM

## 2023-06-14 DIAGNOSIS — G80.3 DYSTONIC CEREBRAL PALSY (HCC): ICD-10-CM

## 2023-06-14 DIAGNOSIS — M81.0 OSTEOPOROSIS, UNSPECIFIED OSTEOPOROSIS TYPE, UNSPECIFIED PATHOLOGICAL FRACTURE PRESENCE: ICD-10-CM

## 2023-06-14 DIAGNOSIS — G20 PARKINSON'S DISEASE (HCC): Primary | ICD-10-CM

## 2023-06-14 LAB
25(OH)D3 SERPL-MCNC: 117 NG/ML (ref 30–100)
ALBUMIN SERPL BCP-MCNC: 3 G/DL (ref 3.5–5)
ALP SERPL-CCNC: 43 U/L (ref 46–116)
ALT SERPL W P-5'-P-CCNC: 20 U/L (ref 12–78)
ANION GAP SERPL CALCULATED.3IONS-SCNC: 1 MMOL/L (ref 4–13)
AST SERPL W P-5'-P-CCNC: 14 U/L (ref 5–45)
BASOPHILS # BLD AUTO: 0.04 THOUSANDS/ÂΜL (ref 0–0.1)
BASOPHILS NFR BLD AUTO: 1 % (ref 0–1)
BILIRUB DIRECT SERPL-MCNC: <0.05 MG/DL (ref 0–0.2)
BILIRUB SERPL-MCNC: 0.22 MG/DL (ref 0.2–1)
BUN SERPL-MCNC: 18 MG/DL (ref 5–25)
CALCIUM ALBUM COR SERPL-MCNC: 10.7 MG/DL (ref 8.3–10.1)
CALCIUM SERPL-MCNC: 9.9 MG/DL (ref 8.3–10.1)
CHLORIDE SERPL-SCNC: 102 MMOL/L (ref 96–108)
CK SERPL-CCNC: 60 U/L (ref 26–192)
CO2 SERPL-SCNC: 35 MMOL/L (ref 21–32)
CREAT SERPL-MCNC: 0.96 MG/DL (ref 0.6–1.3)
EOSINOPHIL # BLD AUTO: 0.31 THOUSAND/ÂΜL (ref 0–0.61)
EOSINOPHIL NFR BLD AUTO: 5 % (ref 0–6)
ERYTHROCYTE [DISTWIDTH] IN BLOOD BY AUTOMATED COUNT: 13 % (ref 11.6–15.1)
GFR SERPL CREATININE-BSD FRML MDRD: 64 ML/MIN/1.73SQ M
GLUCOSE P FAST SERPL-MCNC: 205 MG/DL (ref 65–99)
HCT VFR BLD AUTO: 38.3 % (ref 34.8–46.1)
HGB BLD-MCNC: 12.2 G/DL (ref 11.5–15.4)
IMM GRANULOCYTES # BLD AUTO: 0.03 THOUSAND/UL (ref 0–0.2)
IMM GRANULOCYTES NFR BLD AUTO: 1 % (ref 0–2)
LYMPHOCYTES # BLD AUTO: 2.01 THOUSANDS/ÂΜL (ref 0.6–4.47)
LYMPHOCYTES NFR BLD AUTO: 31 % (ref 14–44)
MCH RBC QN AUTO: 33.8 PG (ref 26.8–34.3)
MCHC RBC AUTO-ENTMCNC: 31.9 G/DL (ref 31.4–37.4)
MCV RBC AUTO: 106 FL (ref 82–98)
MONOCYTES # BLD AUTO: 0.57 THOUSAND/ÂΜL (ref 0.17–1.22)
MONOCYTES NFR BLD AUTO: 9 % (ref 4–12)
NEUTROPHILS # BLD AUTO: 3.45 THOUSANDS/ÂΜL (ref 1.85–7.62)
NEUTS SEG NFR BLD AUTO: 53 % (ref 43–75)
NRBC BLD AUTO-RTO: 0 /100 WBCS
PLATELET # BLD AUTO: 192 THOUSANDS/UL (ref 149–390)
PMV BLD AUTO: 12.4 FL (ref 8.9–12.7)
POTASSIUM SERPL-SCNC: 4.4 MMOL/L (ref 3.5–5.3)
PROT SERPL-MCNC: 7 G/DL (ref 6.4–8.4)
PTH-INTACT SERPL-MCNC: 10.5 PG/ML (ref 12–88)
RBC # BLD AUTO: 3.61 MILLION/UL (ref 3.81–5.12)
SODIUM SERPL-SCNC: 138 MMOL/L (ref 135–147)
WBC # BLD AUTO: 6.41 THOUSAND/UL (ref 4.31–10.16)

## 2023-06-14 PROCEDURE — 82550 ASSAY OF CK (CPK): CPT

## 2023-06-14 PROCEDURE — 83970 ASSAY OF PARATHORMONE: CPT

## 2023-06-14 PROCEDURE — 80053 COMPREHEN METABOLIC PANEL: CPT

## 2023-06-14 PROCEDURE — 36415 COLL VENOUS BLD VENIPUNCTURE: CPT

## 2023-06-14 PROCEDURE — 97140 MANUAL THERAPY 1/> REGIONS: CPT

## 2023-06-14 PROCEDURE — 97112 NEUROMUSCULAR REEDUCATION: CPT

## 2023-06-14 PROCEDURE — 86480 TB TEST CELL IMMUN MEASURE: CPT

## 2023-06-14 PROCEDURE — 82306 VITAMIN D 25 HYDROXY: CPT

## 2023-06-14 PROCEDURE — 85025 COMPLETE CBC W/AUTO DIFF WBC: CPT

## 2023-06-14 PROCEDURE — 82248 BILIRUBIN DIRECT: CPT

## 2023-06-14 PROCEDURE — 83036 HEMOGLOBIN GLYCOSYLATED A1C: CPT

## 2023-06-14 NOTE — PROGRESS NOTES
"Occupational Therapy Daily Note:    Today's date: 2023  Patient name: Alfredito Siddiqui  : 1960  MRN: [id number]  Referring provider: Neida Momin DO  Dx:   Encounter Diagnoses   Name Primary? • Parkinson's disease ([place] [address] ) Yes   • Dystonic cerebral palsy ([facility] )        Duration in weeks: 8  Plan of care beginning date: 23  Plan of care expiration date: 23    Subjective: \"mmm\" Pt's caregivers reported \"this is the most she's used her fingers in awhile  \"    Objective: Pt engaged in skilled OT treatment session with focus on R attention, FMC/GMC, FMS/GMS and edema management to increase engagement, endurance, tolerance, and independence with daily ADL and IADL tasks  CPT Code Minutes                                           Task Details        Therapeutic Activity               Neuro Re-Ed 25 Pt completed functional task to improve body awareness/proprioception and attention to right side  Pt instructed to grasp large foam blocks and place into container  Pt initiated grasp with thumb, D2 & D3 and was able to grasp and hold block  Pt initiated arm movement towards container, required [facility] assistance to reach container  Initiated functional release of object indep >25% of trials  Therapeutic Exercise               Manual 10  Pt tolerated retrograde massage distal to proximal UE to improve tissue circulation, dec swelling and improve tissue elasticity/flexibility  No evidence of pain with manual treatment  Self Care 10 Pt's caregiver's educated to take pt's right hand out of splint to allow functional use of right hand at home  Assessment: Tolerated treatment well  Pt demo inconsistent grasp and release of object, with grasp initiated with thumb, digits 2 & 3 primarily, required [facility] assist for palmar grasp  Patient would benefit from continued skilled OT      Plan: Continued skilled OT per POC    INTERVENTION COMMENTS:  Diagnosis: Parkinson's disease ([facility] ) " [G20]  Precautions: difficulty speaking  FOTO: Not completed  Insurance: Payor: [id number] [facility],[address] [facility] REP / Plan: Niall Nguyen / Product Type: Medicare HMO /   2 of 10 visits, PN due 7/5/23  POC expires: 8/5/23

## 2023-06-15 LAB
EST. AVERAGE GLUCOSE BLD GHB EST-MCNC: 151 MG/DL
HBA1C MFR BLD: 6.9 %

## 2023-06-15 NOTE — PROGRESS NOTES
"Occupational Therapy Daily Note:    Today's date: 2023  Patient name: Colette Willett  : 1960  MRN: [id number]  Referring provider: Lisa Alcantar, DO  Dx:   Encounter Diagnoses   Name Primary? • Parkinson's disease ([facility] ) Yes   • Dystonic cerebral palsy ([facility] )        Duration in weeks: 8  Plan of care beginning date: 23  Plan of care expiration date: 23    Subjective: \"mmm\" Pt's caregivers reported \"I'm concerned about the swelling in her hand  \"     Objective: Pt engaged in skilled OT treatment session with focus on R attention, FMC/GMC, FMS/GMS and edema management to increase engagement, endurance, tolerance, and independence with daily ADL and IADL tasks  CPT Code Minutes                                           Task Details        Therapeutic Activity 35 Pt used R hand to initiate shoulder flexion to retreive beanbag placed in various planes at tabletop and then crossed midline to place beanbags into bucket to increase proximal stability/strength, functional reaching, RUE ROM, gross grasp, hand to target accuracy, bilateral integration, activity tolerance, and overall endurance  Pt initiated grasp with thumb and D2  Therapist providing [place] A to initiate finger extension to grasp beanbag with gross grasp instead of pincer grasp  Neuro Re-Ed               Therapeutic Exercise               Manual 10 Pt tolerated retrograde massage distal to proximal UE to improve tissue circulation, dec swelling and improve tissue elasticity/flexibility  No evidence of pain with manual treatment  Self Care         Assessment: Tolerated treatment well  Pt benefits from working in [facility] and working with music playing (country or [place]) to decrease distractions and increase motivation  Pt initiating pincer grasp of beanbag; therapist providing [place] A to initiate finger extension to facilitate gross grasp of beanbags  Patient would benefit from continued skilled OT      Plan: Continued " skilled OT per POC    INTERVENTION COMMENTS:  Diagnosis: Parkinson's disease ([facility] ) [G20]  Precautions: difficulty speaking  FOTO: Not completed  Insurance: Payor: [address] [place] [address],[address] [facility] REP / Plan: Walter Fees / Product Type: Medicare HMO /   3 of 10 visits, PN due 7/5/23  POC expires: 8/5/23

## 2023-06-16 ENCOUNTER — OFFICE VISIT (OUTPATIENT)
Dept: OCCUPATIONAL THERAPY | Facility: CLINIC | Age: 63
End: 2023-06-16
Payer: MEDICARE

## 2023-06-16 DIAGNOSIS — G20 PARKINSON'S DISEASE (HCC): Primary | ICD-10-CM

## 2023-06-16 DIAGNOSIS — G80.3 DYSTONIC CEREBRAL PALSY (HCC): ICD-10-CM

## 2023-06-16 PROCEDURE — 97530 THERAPEUTIC ACTIVITIES: CPT

## 2023-06-16 PROCEDURE — 97140 MANUAL THERAPY 1/> REGIONS: CPT

## 2023-06-17 LAB
GAMMA INTERFERON BACKGROUND BLD IA-ACNC: 0.05 IU/ML
M TB IFN-G BLD-IMP: NEGATIVE
M TB IFN-G CD4+ BCKGRND COR BLD-ACNC: 0.01 IU/ML
M TB IFN-G CD4+ BCKGRND COR BLD-ACNC: 0.01 IU/ML
MITOGEN IGNF BCKGRD COR BLD-ACNC: >10 IU/ML

## 2023-06-19 ENCOUNTER — TELEPHONE (OUTPATIENT)
Dept: FAMILY MEDICINE CLINIC | Facility: CLINIC | Age: 63
End: 2023-06-19

## 2023-06-19 NOTE — TELEPHONE ENCOUNTER
Lisa calling patient had 3 episodes blood on stool but not all the times this morning nothing  Patient is not having rectal bleeding just some on stool    Vital signs normal and patient is eating normal   Juma Summers wants to know if there should be a follow up   Juma Summers can be reached at [phone]

## 2023-06-20 ENCOUNTER — OFFICE VISIT (OUTPATIENT)
Dept: OCCUPATIONAL THERAPY | Facility: CLINIC | Age: 63
End: 2023-06-20
Payer: MEDICARE

## 2023-06-20 ENCOUNTER — OFFICE VISIT (OUTPATIENT)
Dept: FAMILY MEDICINE CLINIC | Facility: CLINIC | Age: 63
End: 2023-06-20

## 2023-06-20 VITALS
OXYGEN SATURATION: 97 % | DIASTOLIC BLOOD PRESSURE: 70 MMHG | TEMPERATURE: 97.6 F | SYSTOLIC BLOOD PRESSURE: 102 MMHG | HEART RATE: 78 BPM

## 2023-06-20 DIAGNOSIS — G20 PARKINSON'S DISEASE (HCC): Primary | ICD-10-CM

## 2023-06-20 DIAGNOSIS — G80.3 DYSTONIC CEREBRAL PALSY (HCC): ICD-10-CM

## 2023-06-20 DIAGNOSIS — K64.8 INTERNAL HEMORRHOIDS: Primary | ICD-10-CM

## 2023-06-20 PROCEDURE — 99213 OFFICE O/P EST LOW 20 MIN: CPT | Performed by: FAMILY MEDICINE

## 2023-06-20 PROCEDURE — 97530 THERAPEUTIC ACTIVITIES: CPT

## 2023-06-20 PROCEDURE — 97140 MANUAL THERAPY 1/> REGIONS: CPT

## 2023-06-20 NOTE — PROGRESS NOTES
Occupational Therapy Daily Note:    Today's date: 2023  Patient name: Good Freeman  : 1960  MRN: [id number]  Referring provider: Rodney Dee DO  Dx:   Encounter Diagnoses   Name Primary? • Parkinson's disease ([facility] ) Yes   • Dystonic cerebral palsy ([facility] )        Duration in weeks: 8  Plan of care beginning date: 23  Plan of care expiration date: 23    Subjective: Pt's caregiver states she is being a little resistant to therapy today  Objective: Pt engaged in skilled OT treatment session with focus on R attention, FMC/GMC, FMS/GMS and edema management to increase engagement, endurance, tolerance, and independence with daily ADL and IADL tasks  CPT Code Minutes                                           Task Details        Therapeutic Activity 35 Pt used R hand to initiate shoulder flexion to retreive beanbag placed in various planes at tabletop and then crossed midline to place and/or drop beanbags into bucket to increase proximal stability/strength, functional reaching, RUE ROM, gross grasp, hand to target accuracy, bilateral integration, activity tolerance, and overall endurance  Pt initiated grasp with thumb and D2  Therapist providing [place] A to initiate finger extension to grasp beanbag with gross grasp instead of pincer grasp  Pt performed similar activity with small cones as well  Neuro Re-Ed               Therapeutic Exercise               Manual 10 Pt tolerated retrograde massage distal to proximal UE to improve tissue circulation, dec swelling and improve tissue elasticity/flexibility  No evidence of pain with manual treatment  Self Care         Assessment: Tolerated treatment fair  She participated at moments with max verbal and physical cues as well as hand over hand assistance  Pt tolerated retrograde massage but often pulled hand away from therapist  Patient would benefit from continued skilled OT      Plan: Continued skilled OT per POC    INTERVENTION COMMENTS:  Diagnosis: Parkinson's disease ([facility] ) [G20]  Precautions: difficulty speaking  FOTO: Not completed  Insurance: Payor: [id number] [facility] [facility] REP / Plan: Eryn Rossi / Product Type: Medicare HMO /   4 of 10 visits, PN due 7/5/23  POC expires: 8/5/23

## 2023-06-20 NOTE — PROGRESS NOTES
Name: Lisa Carcamo      : 1960      MRN: [id number]  Encounter Provider: Janice Gomez MD  Encounter Date: 2023   Encounter department: [facility]     1  Internal hemorrhoids  Assessment & Plan:  Hx of nonbleeding internal hemorrhoids as discovered on last colonoscopy   Otherwise repeat in 5 years and recommend high fiber diet   Wendelin Payment presents for 3 episodes of painless bleeding per rectum over last 3 days  First episode was 3 days ago when bright red blood was noticed on wiping after voiding  A hemorrhoid was visualized at this time  Second and third instances were with stooling 2 days ago with scant bright red blood that did not fill toilet bowl  Hemorrhoid was no longer seen at this time  Pt has since had 3 soft, light brown stools since most recent episode of bleeding and caregiver reports bleeding has spontaneously resolved without intervention  Denies N/V, fevers, dizziness, rectal pain, or weight loss  Abdomen reveals normoactive bowel sounds, soft and nondistended, nontender to palpation  Given history and quick resolution likely secondary to hemorrhoids and not infectious or malignancy     Plan  - Pt and caregiver reminded of finding of internal hemorrhoids on previous colonoscopy and education provided on internal hemorrhoids being a potential cause of painless bleeding vs external hemorrhoids  - Pt and caregiver instructed to monitor for any further bleeding or concerning symptoms (fever, weight loss, dizziness)   If hemorrhoid is visualized again or if similar pattern of bleeding presents, Pt will call in to receive topical hemorrhoid cream  Also recommend symptomatic supportive care of ice and sitz bathes if needed   - recommend continue high fiber diet to prevent constipation  - will require repeat screening colonoscopy in 4 years or sooner if concerning symptoms develop   - has scheduled f/u appointments later this month           Subjective      HPI     Martha Bernstein is a 67 yo F presenting with hx of internal hemorrhoids for 3 episodes of painless bleeding per rectum that have spontaneously resolved  History was obtained from charge nurse of her group home 2/2 Sharon Leroy being nonverbal  First episode was noticed 3 days ago upon wiping after voiding  A hemorrhoid was noted by another caregiver  Blood was bright red and scant that did not fill toilet bowl  Second and third episodes were streaky bright red blood with bowel movements but charge nurse reports hemorrhoid could no longer be visualized  Pt has had 3 light brown soft stools with no blood since last episode of bleeding and has been eating well  Denies N/V, fevers, dizziness, rectal pain, or weight loss  Review of Systems   Constitutional: Negative for activity change, appetite change, chills, fever and unexpected weight change  HENT: Negative for congestion and ear pain  Respiratory: Negative for apnea, shortness of breath, wheezing and stridor  Gastrointestinal: Positive for anal bleeding and blood in stool  Negative for abdominal distention, abdominal pain, constipation, diarrhea, nausea, rectal pain and vomiting  Musculoskeletal: Positive for joint swelling  Skin: Negative for color change, rash and wound  Allergic/Immunologic: Negative  Neurological: Negative for dizziness, syncope and light-headedness  Hematological: Negative  Psychiatric/Behavioral: Negative          Current Outpatient Medications on File Prior to Visit   Medication Sig   • ARIPiprazole (ABILIFY) 2 mg tablet Take 1 tablet by mouth daily at bedtime   • baclofen 10 mg tablet Take 1 tablet (10 mg total) by mouth 3 (three) times a day   • benzonatate (TESSALON PERLES) 100 mg capsule TAKE 1 CAPSULE BY MOUTH EVERY 8 HOURS AS NEEDED FOR COUGH **DO NOT CRUSH**   • Botox 100 units INJECT UP  UNITS IN THE MUSCLE INTO LOWER AND UPPER EXTREMITIES ONCE EVERY 3 MONTHS   • calcium carbonate (OS-MORRIS) 600 MG tablet Take 1 tablet (600 mg total) by mouth 2 (two) times a day with meals   • carbamide peroxide (DEBROX) 6 5 % otic solution 2 drops 2 drops in the affected ear prn x 5 days   • carbidopa-levodopa (SINEMET CR)  mg TBCR per ER tablet Take 2 tablets by mouth 2 (two) times a day Take morning dose 7 Am and afternoon dose at 4 PM   • carboxymethylcellulose (REFRESH PLUS) 0 5 % SOLN 1 drop 3 (three) times a day as needed for dry eyes   • Cholecalciferol (Vitamin D3) 50 MCG (2000 UT) TABS    • citalopram (CeleXA) 40 mg tablet Take 1 tablet by mouth daily   • denosumab (PROLIA) 60 mg/mL Inject 1 mL (60 mg total) under the skin once for 1 dose   • DIABETIC TUSSIN  MG/5ML oral liquid TAKE 2 TEASPOONSFUL (10ML) BY MOUTH EVERY 4 HOURS AS NEEDED FOR COUGH & CONGESTION   • divalproex sodium (DEPAKOTE SPRINKLE) 125 MG capsule 4 capsules 2 (two) times a day 4 caps BID   • docosanol (ABREVA) 10 % Apply topically 5 (five) times a day Apply and rub in 5x a day until healed as needed for lesion   • ferrous sulfate 324 (65 Fe) mg Take 1 tablet by mouth 2 (two) times a day   • fexofenadine (ALLEGRA) 30 MG/5ML suspension Take 30 mL (180 mg total) by mouth daily as needed (seasonal allergies)   • fluticasone (FLONASE) 50 mcg/act nasal spray 1 spray into each nostril daily as needed for rhinitis or allergies   • glimepiride (AMARYL) 1 mg tablet Take 1 tablet (1 mg total) by mouth daily with breakfast   • hydrocortisone 1 % cream Apply topically to affected area twice daily as needed for rash   • levothyroxine (SYNTHROID) 25 mcg tablet Take 1 tablet (25 mcg) by mouth every morning   • linaGLIPtin (Tradjenta) 5 MG TABS Take 5 mg by mouth daily (Patient not taking: Reported on 5/16/2023)   • LORazepam (ATIVAN) 0 5 mg tablet Take 0 5 mg by mouth daily Every day at 8pm   • metFORMIN (GLUCOPHAGE) 1000 MG tablet Take 1 tablet (1,000 mg total) by mouth 2 (two) times a day with meals   • neomycin-bacitracin-polymyxin b (NEOSPORIN) ointment Apply 1 application topically 2 (two) times a day as needed Apply to affected area BID PRN   • nitrofurantoin (MACRODANTIN) 50 mg capsule Take 1 capsule by mouth daily at bedtime   • olopatadine HCl (PATADAY) 0 2 % opth drops Administer 1 drop to both eyes as needed Instill 1 drop into affected eyes daily PRN for eye redness   • omeprazole (PriLOSEC) 20 mg delayed release capsule Take 1 capsule (20 mg total) by mouth 2 (two) times a day before meals   • oxybutynin (DITROPAN) 5 mg tablet  (Patient not taking: Reported on 5/16/2023)   • Oxybutynin Chloride (DITROPAN XL PO) Take 5 mg by mouth 3 (three) times a day 1 tablet TID   • polyethylene glycol (GLYCOLAX) 17 GM/SCOOP powder DISSOLVE 17GM (1 CAPFUL) IN 8 OZ FLUIDS AND CONSUME BY MOUTH EVERY MORNING *HOLD 1 DAY FOR LOOSE STOOLS* (CONSTIPATION)   • simvastatin (ZOCOR) 20 mg tablet Take 1 tablet (20 mg total) by mouth daily with dinner at 4 pm for hyperlipidemia   • Starch, Thickening, POWD Take by mouth daily Use on food and liquid as directed   • traZODone (DESYREL) 100 mg tablet Take 1 tablet (100 mg total) by mouth daily at  8 pm for depression   • valACYclovir (VALTREX) 1,000 mg tablet Take 1 tablet (1,000 mg total) by mouth 2 (two) times a day for 7 days (Patient not taking: Reported on 5/16/2023)   • Vitamins A & D (VITAMIN A & D) ointment Apply topically as needed for skin irritation       Objective     /70 (BP Location: Left arm, Patient Position: Sitting, Cuff Size: Child)   Pulse 78   Temp 97 6 °F (36 4 °C) (Temporal)   LMP 11/29/2009 (Exact Date)   SpO2 97%     Physical Exam  Vitals reviewed  Constitutional:       General: She is not in acute distress  HENT:      Head: Normocephalic and atraumatic  Right Ear: External ear normal       Left Ear: External ear normal       Mouth/Throat:      Mouth: Mucous membranes are dry  Cardiovascular:      Heart sounds: Normal heart sounds   No murmur heard  No friction rub  No gallop  Pulmonary:      Effort: Pulmonary effort is normal       Breath sounds: Normal breath sounds  Abdominal:      General: Bowel sounds are normal  There is no distension  Palpations: Abdomen is soft  There is no mass  Tenderness: There is no abdominal tenderness  There is no guarding  Musculoskeletal:         General: Swelling present  Comments: Edematous R hand and forearm   Skin:     General: Skin is warm and dry  Neurological:      Mental Status: She is alert and oriented to person, place, and time  Mental status is at baseline  Motor: Weakness present         Brooke Davis MD

## 2023-06-20 NOTE — ASSESSMENT & PLAN NOTE
Hx of nonbleeding internal hemorrhoids as discovered on last colonoscopy 2022  Otherwise repeat in 5 years and recommend high fiber diet   Miguel A Swain presents for 3 episodes of painless bleeding per rectum over last 3 days  First episode was 3 days ago when bright red blood was noticed on wiping after voiding  A hemorrhoid was visualized at this time  Second and third instances were with stooling 2 days ago with scant bright red blood that did not fill toilet bowl  Hemorrhoid was no longer seen at this time  Pt has since had 3 soft, light brown stools since most recent episode of bleeding and caregiver reports bleeding has spontaneously resolved without intervention  Denies N/V, fevers, dizziness, rectal pain, or weight loss  Abdomen reveals normoactive bowel sounds, soft and nondistended, nontender to palpation  Given history and quick resolution likely secondary to hemorrhoids and not infectious or malignancy     Plan  - Pt and caregiver reminded of finding of internal hemorrhoids on previous colonoscopy and education provided on internal hemorrhoids being a potential cause of painless bleeding vs external hemorrhoids  - Pt and caregiver instructed to monitor for any further bleeding or concerning symptoms (fever, weight loss, dizziness)   If hemorrhoid is visualized again or if similar pattern of bleeding presents, Pt will call in to receive topical hemorrhoid cream  Also recommend symptomatic supportive care of ice and sitz bathes if needed   - recommend continue high fiber diet to prevent constipation  - will require repeat screening colonoscopy in 4 years or sooner if concerning symptoms develop   - has scheduled f/u appointments later this month

## 2023-06-20 NOTE — PATIENT INSTRUCTIONS
Hemorrhoids   WHAT YOU NEED TO KNOW:   Hemorrhoids are swollen blood vessels inside your rectum (internal hemorrhoids) or on your anus (external hemorrhoids)  Sometimes a hemorrhoid may prolapse  This means it extends out of your anus  DISCHARGE INSTRUCTIONS:   Return to the emergency department if:   You have severe pain in your rectum or around your anus  You have severe pain in your abdomen and you are vomiting  You have bleeding from your anus that soaks through your underwear  Contact your healthcare provider if:   You have frequent and painful bowel movements  Your hemorrhoid looks or feels more swollen than usual      You do not have a bowel movement for 2 days or more  You see or feel tissue coming through your anus  You have questions or concerns about your condition or care  Medicines: You may  need any of the following:  A pad, cream, or ointment  can help decrease pain, swelling, and itching  Stool softeners  help treat or prevent constipation  NSAIDs , such as ibuprofen, help decrease swelling, pain, and fever  NSAIDs can cause stomach bleeding or kidney problems in certain people  If you take blood thinner medicine, always ask your healthcare provider if NSAIDs are safe for you  Always read the medicine label and follow directions  Take your medicine as directed  Contact your healthcare provider if you think your medicine is not helping or if you have side effects  Tell your provider if you are allergic to any medicine  Keep a list of the medicines, vitamins, and herbs you take  Include the amounts, and when and why you take them  Bring the list or the pill bottles to follow-up visits  Carry your medicine list with you in case of an emergency  Manage your symptoms:   Apply ice on your anus for 15 to 20 minutes every hour or as directed  Use an ice pack, or put crushed ice in a plastic bag  Cover it with a towel before you apply it to your anus   Ice helps prevent tissue damage and decreases swelling and pain  Take a sitz bath  Fill a bathtub with 4 to 6 inches of warm water  You may also use a sitz bath pan that fits inside a toilet bowl  Sit in the sitz bath for 15 minutes  Do this 3 times a day, and after each bowel movement  The warm water can help decrease pain and swelling  Keep your anal area clean  Gently wash the area with warm water daily  Soap may irritate the area  After a bowel movement, wipe with moist towelettes or wet toilet paper  Dry toilet paper can irritate the area  Prevent hemorrhoids:   Do not strain to have a bowel movement  Do not sit on the toilet too long  These actions can increase pressure on the tissues in your rectum and anus  Drink plenty of liquids  Liquids can help prevent constipation  Ask how much liquid to drink each day and which liquids are best for you  Eat a variety of high-fiber foods  Examples include fruits, vegetables, and whole grains  Ask your healthcare provider how much fiber you need each day  You may need to take a fiber supplement  Exercise as directed  Exercise, such as walking, may make it easier to have a bowel movement  Ask your healthcare provider to help you create an exercise plan  Do not have anal sex  Anal sex can weaken the skin around your rectum and anus  Avoid heavy lifting  This can cause straining and increase your risk for another hemorrhoid  Follow up with your doctor as directed:  Write down your questions so you remember to ask them during your visits  © Copyright [employer] 2022 Information is for End User's use only and may not be sold, redistributed or otherwise used for commercial purposes  The above information is an  only  It is not intended as medical advice for individual conditions or treatments  Talk to your doctor, nurse or pharmacist before following any medical regimen to see if it is safe and effective for you  76

## 2023-06-23 ENCOUNTER — OFFICE VISIT (OUTPATIENT)
Dept: OCCUPATIONAL THERAPY | Facility: CLINIC | Age: 63
End: 2023-06-23
Payer: MEDICARE

## 2023-06-23 DIAGNOSIS — G20 PARKINSON'S DISEASE (HCC): Primary | ICD-10-CM

## 2023-06-23 DIAGNOSIS — G80.3 DYSTONIC CEREBRAL PALSY (HCC): ICD-10-CM

## 2023-06-23 PROCEDURE — 97112 NEUROMUSCULAR REEDUCATION: CPT

## 2023-06-23 PROCEDURE — 97140 MANUAL THERAPY 1/> REGIONS: CPT

## 2023-06-23 NOTE — PROGRESS NOTES
Occupational Therapy Daily Note:    Today's date: 2023  Patient name: Tadeo Ray  : 1960  MRN: [id number]  Referring provider: Dayday Rasmussen DO  Dx:   Encounter Diagnoses   Name Primary? • Parkinson's disease ([facility] 75 ) Yes   • Dystonic cerebral palsy ([facility] 75 )        Duration in weeks: 8  Plan of care beginning date: 23  Plan of care expiration date: 23    Time in: 1000  Time out: 1042  Total time: 42 minutes      Subjective: Pt's caregiver states she is being a little resistant to therapy today  Objective: Pt engaged in skilled OT treatment session with focus on R attention, FMC/GMC, FMS/GMS and edema management to increase engagement, endurance, tolerance, and independence with daily ADL and IADL tasks  CPT Code Minutes                                           Task Details        Therapeutic Activity 32 Pt used R hand to initiate shoulder flexion to retreive beanbag placed in various planes at tabletop and then crossed midline to place and/or drop beanbags into bucket to increase proximal stability/strength, functional reaching, RUE ROM, gross grasp, hand to target accuracy, bilateral integration, activity tolerance, and overall endurance  Pt initiated grasp with thumb and D2  Neuro Re-Ed               Therapeutic Exercise               Manual 10 Pt tolerated retrograde massage distal to proximal UE to improve tissue circulation, dec swelling and improve tissue elasticity/flexibility  No evidence of pain with manual treatment  Self Care         Assessment: Tolerated treatment fair  Pt requires max verbal and physical cues to engage in session  Able to retrieve bean bags from table top or lap surface and place into bucket or across body to therapist  Patient would benefit from continued skilled OT      Plan: Continued skilled OT per POC    INTERVENTION COMMENTS:  Diagnosis: Parkinson's disease ([facility] 75 ) [G20]  Precautions: difficulty speaking  FOTO: Not completed  Insurance: Payor: Anna Pisano [facility]ICARE [facility] REP / Plan: Chris Alanis / Product Type: Medicare HMO /   5 of 10 visits, PN due 7/5/23  POC expires: 8/5/23

## 2023-06-26 ENCOUNTER — OFFICE VISIT (OUTPATIENT)
Dept: OCCUPATIONAL THERAPY | Facility: CLINIC | Age: 63
End: 2023-06-26
Payer: MEDICARE

## 2023-06-26 DIAGNOSIS — G80.3 DYSTONIC CEREBRAL PALSY (HCC): ICD-10-CM

## 2023-06-26 DIAGNOSIS — G20 PARKINSON'S DISEASE (HCC): Primary | ICD-10-CM

## 2023-06-26 PROCEDURE — 97530 THERAPEUTIC ACTIVITIES: CPT

## 2023-06-26 NOTE — PROGRESS NOTES
"Occupational Therapy Daily Note:    Today's date: 2023  Patient name: Eneida Cummins  : 1960  MRN: [id number]  Referring provider: Jojo Pereira, DO  Dx:   Encounter Diagnoses   Name Primary? • Parkinson's disease ([facility] 75 ) Yes   • Dystonic cerebral palsy ([facility] 75 )        Duration in weeks: 8  Plan of care beginning date: 23  Plan of care expiration date: 23      Subjective: Pt's caregiver states \"You want me to tell Ravin Ibarra that you were good today, right? \"     Objective: Pt engaged in skilled OT treatment session with focus on R attention, FMC/GMC, FMS/GMS and edema management to increase engagement, endurance, tolerance, and independence with daily ADL and IADL tasks  CPT Code Minutes                                           Task Details        Therapeutic Activity 45 At tabletop, pt used R hand to reach into red basket positioned on pt's R side and grasp foam disk, and then facilitate shoulder flexion to place onto wooden dowels to increase proximal stability/strength, functional reaching, RUE ROM, gross grasp, hand to target accuracy, bilateral integration, activity tolerance, and overall endurance  Pt initiated grasp with thumb and D2  Neuro Re-Ed               Therapeutic Exercise               Manual          Self Care         Assessment: Tolerated treatment fair  Today's session took place in the OT suite and therapist played country music during the session  Pt requires max verbal and physical cues to engage in session  Therapist downgrading activity (not requiring pt to reach as high) to decrease demand to in turn increase participation  Patient would benefit from continued skilled OT      Plan: Continued skilled OT per POC    INTERVENTION COMMENTS:  Diagnosis: Parkinson's disease ([facility] 75 ) [G20]  Precautions: difficulty speaking  FOTO: Not completed  Insurance: Payor: [address] [place] [address]  REP / Plan: Eryn Rossi / Product Type: Medicare HMO / " 6 of 10 visits, PN due 7/5/23  POC expires: 8/5/23

## 2023-06-28 ENCOUNTER — OFFICE VISIT (OUTPATIENT)
Dept: FAMILY MEDICINE CLINIC | Facility: CLINIC | Age: 63
End: 2023-06-28

## 2023-06-28 ENCOUNTER — TELEPHONE (OUTPATIENT)
Dept: NEUROLOGY | Facility: CLINIC | Age: 63
End: 2023-06-28

## 2023-06-28 VITALS
TEMPERATURE: 97.5 F | SYSTOLIC BLOOD PRESSURE: 97 MMHG | DIASTOLIC BLOOD PRESSURE: 60 MMHG | OXYGEN SATURATION: 97 % | HEART RATE: 86 BPM

## 2023-06-28 DIAGNOSIS — M24.541 CONTRACTURE OF RIGHT HAND: Primary | ICD-10-CM

## 2023-06-28 NOTE — PROGRESS NOTES
Name: Robin Reyes      : 1960      MRN: [id number]  Encounter Provider: Christiana Nowak DO  Encounter Date: 2023   Encounter department: [address] [place] [address]     1  Contracture of right hand    Patient presents for follow-up of right hand contracture versus spasm of the flexor apparatus  She has trialed a course of wrist bracing and formal hand therapy  Per caregiver request, I will recommend continuation of wrist bracing with exception for hygiene and for participation in group activities at her group home  Also at this time, I recommend patient discuss her current symptomatology with her physiatrist Dr Tayla Cm, who the caregiver tells me has not evaluated the right upper extremity before  The caregiver informs me that she has an upcoming appointment with PMR in July  Recommend follow-up with our office as scheduled with her resident PCP Dr Maria A Arriola  Patient and her caregiver endorsed understanding and acceptance to the above plan  Subjective      Robin Reyes is a 66 y o  female with an extensive past medical history as previously described who presents today for follow-up visit  Last seen by me 2023 regarding right hand contracture  She presents today along with her charge nurse from her group home  At her last visit, for her right hand contracture versus spasm of the flexor apparatus, I did recommend trial of wrist bracing and formal hand therapy  Her nurse reports to me that the patient has been compliant with wearing the brace throughout the day  She has also been compliant with occupational therapy  Her nurse states there is no significant improvement in the level of contracture when out of the brace but that her presenting right wrist swelling has improved with therapy  Her nurse also states that the patient does not seem to be as uncomfortable as when symptoms began      Review of Systems   Constitutional: Negative for chills, fatigue and fever  HENT: Negative for congestion, postnasal drip, rhinorrhea, sinus pressure, sinus pain and sore throat  Eyes: Negative for visual disturbance  Respiratory: Negative for cough, shortness of breath and wheezing  Cardiovascular: Negative for chest pain and palpitations  Gastrointestinal: Negative for abdominal pain, constipation, diarrhea, nausea and vomiting  Genitourinary: Negative for difficulty urinating, dysuria, frequency and urgency  Musculoskeletal: Negative for arthralgias, back pain, gait problem, myalgias, neck pain and neck stiffness  Skin: Negative for pallor, rash and wound  Neurological: Negative for dizziness, seizures, syncope, weakness, light-headedness, numbness and headaches          Right hand contracture       Current Outpatient Medications on File Prior to Visit   Medication Sig   • ARIPiprazole (ABILIFY) 2 mg tablet Take 1 tablet by mouth daily at bedtime   • baclofen 10 mg tablet Take 1 tablet (10 mg total) by mouth 3 (three) times a day   • benzonatate (TESSALON PERLES) 100 mg capsule TAKE 1 CAPSULE BY MOUTH EVERY 8 HOURS AS NEEDED FOR COUGH **DO NOT CRUSH**   • Botox 100 units INJECT UP  UNITS IN THE MUSCLE INTO LOWER AND UPPER EXTREMITIES ONCE EVERY 3 MONTHS   • calcium carbonate (OS-MORRIS) 600 MG tablet Take 1 tablet (600 mg total) by mouth 2 (two) times a day with meals   • carbamide peroxide (DEBROX) 6 5 % otic solution 2 drops 2 drops in the affected ear prn x 5 days   • carbidopa-levodopa (SINEMET CR)  mg TBCR per ER tablet Take 2 tablets by mouth 2 (two) times a day Take morning dose 7 Am and afternoon dose at 4 PM   • carboxymethylcellulose (REFRESH PLUS) 0 5 % SOLN 1 drop 3 (three) times a day as needed for dry eyes   • Cholecalciferol (Vitamin D3) 50 MCG (2000 UT) TABS    • citalopram (CeleXA) 40 mg tablet Take 1 tablet by mouth daily   • denosumab (PROLIA) 60 mg/mL Inject 1 mL (60 mg total) under the skin once for 1 dose   • DIABETIC TUSSIN  MG/5ML oral liquid TAKE 2 TEASPOONSFUL (10ML) BY MOUTH EVERY 4 HOURS AS NEEDED FOR COUGH & CONGESTION   • divalproex sodium (DEPAKOTE SPRINKLE) 125 MG capsule 4 capsules 2 (two) times a day 4 caps BID   • docosanol (ABREVA) 10 % Apply topically 5 (five) times a day Apply and rub in 5x a day until healed as needed for lesion   • ferrous sulfate 324 (65 Fe) mg Take 1 tablet by mouth 2 (two) times a day   • fexofenadine (ALLEGRA) 30 MG/5ML suspension Take 30 mL (180 mg total) by mouth daily as needed (seasonal allergies)   • fluticasone (FLONASE) 50 mcg/act nasal spray 1 spray into each nostril daily as needed for rhinitis or allergies   • glimepiride (AMARYL) 1 mg tablet Take 1 tablet (1 mg total) by mouth daily with breakfast   • hydrocortisone 1 % cream Apply topically to affected area twice daily as needed for rash   • levothyroxine (SYNTHROID) 25 mcg tablet Take 1 tablet (25 mcg) by mouth every morning   • linaGLIPtin (Tradjenta) 5 MG TABS Take 5 mg by mouth daily (Patient not taking: Reported on 5/16/2023)   • LORazepam (ATIVAN) 0 5 mg tablet Take 0 5 mg by mouth daily Every day at 8pm   • metFORMIN (GLUCOPHAGE) 1000 MG tablet Take 1 tablet (1,000 mg total) by mouth 2 (two) times a day with meals   • neomycin-bacitracin-polymyxin b (NEOSPORIN) ointment Apply 1 application topically 2 (two) times a day as needed Apply to affected area BID PRN   • nitrofurantoin (MACRODANTIN) 50 mg capsule Take 1 capsule by mouth daily at bedtime   • olopatadine HCl (PATADAY) 0 2 % opth drops Administer 1 drop to both eyes as needed Instill 1 drop into affected eyes daily PRN for eye redness   • omeprazole (PriLOSEC) 20 mg delayed release capsule Take 1 capsule (20 mg total) by mouth 2 (two) times a day before meals   • oxybutynin (DITROPAN) 5 mg tablet  (Patient not taking: Reported on 5/16/2023)   • Oxybutynin Chloride (DITROPAN XL PO) Take 5 mg by mouth 3 (three) times a day 1 tablet TID   • polyethylene glycol (GLYCOLAX) 17 GM/SCOOP powder DISSOLVE 17GM (1 CAPFUL) IN 8 OZ FLUIDS AND CONSUME BY MOUTH EVERY MORNING *HOLD 1 DAY FOR LOOSE STOOLS* (CONSTIPATION)   • simvastatin (ZOCOR) 20 mg tablet Take 1 tablet (20 mg total) by mouth daily with dinner at 4 pm for hyperlipidemia   • Starch, Thickening, POWD Take by mouth daily Use on food and liquid as directed   • traZODone (DESYREL) 100 mg tablet Take 1 tablet (100 mg total) by mouth daily at  8 pm for depression   • valACYclovir (VALTREX) 1,000 mg tablet Take 1 tablet (1,000 mg total) by mouth 2 (two) times a day for 7 days (Patient not taking: Reported on 5/16/2023)   • Vitamins A & D (VITAMIN A & D) ointment Apply topically as needed for skin irritation       Objective     BP 97/60 (BP Location: Left arm, Patient Position: Sitting, Cuff Size: Standard)   Pulse 86   Temp 97 5 °F (36 4 °C) (Temporal)   LMP 11/29/2009 (Exact Date)   SpO2 97%     Physical Exam  Vitals and nursing note reviewed  Constitutional:       General: She is not in acute distress  Appearance: Normal appearance  She is normal weight  She is not ill-appearing, toxic-appearing or diaphoretic  HENT:      Head: Normocephalic and atraumatic  Eyes:      General: No scleral icterus  Conjunctiva/sclera: Conjunctivae normal       Pupils: Pupils are equal, round, and reactive to light  Cardiovascular:      Rate and Rhythm: Normal rate and regular rhythm  Pulses: Normal pulses  Heart sounds: Normal heart sounds  No murmur heard  No friction rub  No gallop  Pulmonary:      Effort: Pulmonary effort is normal  No respiratory distress  Breath sounds: Normal breath sounds  No stridor  No wheezing, rhonchi or rales  Abdominal:      General: Abdomen is flat  Bowel sounds are normal  There is no distension  Palpations: Abdomen is soft  There is no mass  Tenderness: There is no abdominal tenderness   There is no right CVA tenderness, left CVA tenderness, guarding or rebound  Hernia: No hernia is present  Musculoskeletal:         General: No swelling or tenderness  Cervical back: Normal range of motion  Lymphadenopathy:      Cervical: No cervical adenopathy  Skin:     General: Skin is warm and dry  Capillary Refill: Capillary refill takes less than 2 seconds  Coloration: Skin is not pale  Findings: No erythema or rash  Neurological:      General: No focal deficit present  Mental Status: She is alert and oriented to person, place, and time  Cranial Nerves: No cranial nerve deficit  Sensory: No sensory deficit  Motor: Abnormal muscle tone present  No weakness  Comments: Contracture of right hand and wrist   Patient has full passive range of motion of the right fingers and wrist but is unable to actively extend her fingers or wrist    Psychiatric:         Mood and Affect: Mood normal          Behavior: Behavior normal          Thought Content:  Thought content normal          Judgment: Judgment normal        Anthony Kumar DO

## 2023-06-28 NOTE — PATIENT INSTRUCTIONS
Recommend continuation of right wrist brace throughout the day as previously prescribed however can allow breaks for hygiene and to participate in activities involving the right hand  Recommend discussion about right hand and wrist contracture with PMR at next scheduled appointment

## 2023-06-30 ENCOUNTER — TELEPHONE (OUTPATIENT)
Dept: FAMILY MEDICINE CLINIC | Facility: CLINIC | Age: 63
End: 2023-06-30

## 2023-06-30 ENCOUNTER — OFFICE VISIT (OUTPATIENT)
Dept: FAMILY MEDICINE CLINIC | Facility: CLINIC | Age: 63
End: 2023-06-30

## 2023-06-30 ENCOUNTER — TELEPHONE (OUTPATIENT)
Dept: NEUROLOGY | Facility: CLINIC | Age: 63
End: 2023-06-30

## 2023-06-30 VITALS
TEMPERATURE: 98.9 F | DIASTOLIC BLOOD PRESSURE: 73 MMHG | OXYGEN SATURATION: 98 % | HEART RATE: 86 BPM | SYSTOLIC BLOOD PRESSURE: 106 MMHG

## 2023-06-30 DIAGNOSIS — G80.3 DYSTONIC CEREBRAL PALSY (HCC): ICD-10-CM

## 2023-06-30 DIAGNOSIS — B02.9 HERPES ZOSTER WITHOUT COMPLICATION: ICD-10-CM

## 2023-06-30 DIAGNOSIS — M81.0 OSTEOPOROSIS, UNSPECIFIED OSTEOPOROSIS TYPE, UNSPECIFIED PATHOLOGICAL FRACTURE PRESENCE: ICD-10-CM

## 2023-06-30 DIAGNOSIS — E11.9 TYPE 2 DIABETES MELLITUS WITHOUT COMPLICATION, WITHOUT LONG-TERM CURRENT USE OF INSULIN (HCC): Primary | ICD-10-CM

## 2023-06-30 DIAGNOSIS — E83.52 HYPERCALCEMIA: ICD-10-CM

## 2023-06-30 PROBLEM — H26.9 CATARACT: Status: ACTIVE | Noted: 2023-06-30

## 2023-06-30 NOTE — TELEPHONE ENCOUNTER
Spoke with Gladys, scheduled patient for 8/1 at 315  Pt needs assessment prior to botox injections due to drop hand issue that started within the last 3 months

## 2023-06-30 NOTE — TELEPHONE ENCOUNTER
Lisa from [facility] called regarding instructions from today's appointment for the patient to reduce Vitamin D prescription, from 2000 mg, to 1000 mg. Pharmacy requires a discontinuation of the original prescription, with a new prescription written for the 1000 mg.     Pat Duffy can be reached at:  [phone] or [phone]

## 2023-06-30 NOTE — ASSESSMENT & PLAN NOTE
L sided, follows with opthalmology  Was provided with opthalmology notes today to scan into chart       -f/u with opthalmology

## 2023-06-30 NOTE — ASSESSMENT & PLAN NOTE
Corrected calcium high  On vitamin D 2,000 units/day and calcium supplementation  Likely 2/2 to vitamin D supplementation       - decrease Vitamin D supplementation to 1,000 units/day

## 2023-06-30 NOTE — ASSESSMENT & PLAN NOTE
DEXA scan in 2018 showed osteoporosis  Treated with prolia, recent corrected calcium levels high  Likely 2/2 to excess vitamin D supplementation  Reviewed CI to prolia injections such as renal function and calcium levels, no CI to prolia injection today       - prolia injection in office today   - decrease vitamin D supplementation to 1,000 units/day   - continue calcium 600mg

## 2023-06-30 NOTE — LETTER
To whom this may concern,    Bharti Caldwell was seen by me today in the office for follow-up  At this time, I recommend continuation of wrist bracing of her right hand with exception for hygiene and for participation in group activities  Please contact us if there are any questions or concerns       Thank you,  Dr Francis Lopez

## 2023-06-30 NOTE — ASSESSMENT & PLAN NOTE
Follows with neurology and receives botox injections for dystonia   Caretaker states it has been helping with abnormal movements      -continue follow ups with neurology

## 2023-06-30 NOTE — ASSESSMENT & PLAN NOTE
Lab Results   Component Value Date    HGBA1C 6 9 (H) 06/14/2023     Diabetes well controlled with goal A1c less than 7  Recent sugars from March-May appears well controlled, usually less than 130  On metformin 1000mg BID and glimepride 1mg daily       - Continue medications   - Scan podiatry foot exam and eye exam notes into the chart   - Consider A1c recheck in December

## 2023-06-30 NOTE — PROGRESS NOTES
Name: Tadeo Ray      : 1960      MRN: [id number]  Encounter Provider: Fonnie Dandy, MD  Encounter Date: 2023   Encounter department: [address] [place] [address]     1  Type 2 diabetes mellitus without complication, without long-term current use of insulin ([facility])  Assessment & Plan:    Lab Results   Component Value Date    HGBA1C 6 9 (H) 2023     Diabetes well controlled with goal A1c less than 7  Recent sugars from March-May appears well controlled, usually less than 130  On metformin 1000mg BID and glimepride 1mg daily  - Continue medications   - Scan podiatry foot exam and eye exam notes into the chart   - Consider A1c recheck in   Osteoporosis, unspecified osteoporosis type, unspecified pathological fracture presence  Assessment & Plan:  DEXA scan in  showed osteoporosis  Treated with prolia, recent corrected calcium levels high  Likely 2/2 to excess vitamin D supplementation  Reviewed CI to prolia injections such as renal function and calcium levels, no CI to prolia injection today  - prolia injection in office today   - decrease vitamin D supplementation to 1,000 units/day   - continue calcium 600mg     Orders:  -     denosumab (PROLIA) subcutaneous injection 60 mg    3  Dystonic cerebral palsy [facility])  Assessment & Plan:  Follows with neurology and receives botox injections for dystonia  Caretaker states it has been helping with abnormal movements      -continue follow ups with neurology       4  Herpes zoster without complication  Assessment & Plan:  Shingles episode   Per CDC guidelines, you do not have to wait after shingles infection to administer the shingles vaccine     - shingles vaccine at next visit       5  Hypercalcemia  Assessment & Plan:  Corrected calcium high  On vitamin D 2,000 units/day and calcium supplementation  Likely 2/2 to vitamin D supplementation       - decrease Vitamin D supplementation to 1,000 units/day             Subjective      Patient presents with caregiver Ailin Jesus for f/u on HTN and osteoporosis  History was given by caregiver due to patient intellectual disability  Caregiver has been monitoring blood sugars at home, numbers have generally been well controlled under 130  She states adding the glimipride led to significantly better blood sugar control  She's been following with neurology, PMR and our office for her right wrist contracture  Needs a letter to allow removing the wrist brace for group activities  Getting her prolia injection today  Review of Systems   Constitutional: Negative for activity change, appetite change, chills, diaphoresis, fatigue and fever  HENT: Negative for congestion  Eyes: Negative for pain  Respiratory: Negative for cough and shortness of breath  Cardiovascular: Negative for chest pain  Gastrointestinal: Negative for abdominal distention and abdominal pain  Psychiatric/Behavioral: Negative for agitation, behavioral problems and confusion         Current Outpatient Medications on File Prior to Visit   Medication Sig   • ARIPiprazole (ABILIFY) 2 mg tablet Take 1 tablet by mouth daily at bedtime   • baclofen 10 mg tablet Take 1 tablet (10 mg total) by mouth 3 (three) times a day   • benzonatate (TESSALON PERLES) 100 mg capsule TAKE 1 CAPSULE BY MOUTH EVERY 8 HOURS AS NEEDED FOR COUGH **DO NOT CRUSH**   • Botox 100 units INJECT UP  UNITS IN THE MUSCLE INTO LOWER AND UPPER EXTREMITIES ONCE EVERY 3 MONTHS   • calcium carbonate (OS-MORRIS) 600 MG tablet Take 1 tablet (600 mg total) by mouth 2 (two) times a day with meals   • carbamide peroxide (DEBROX) 6 5 % otic solution 2 drops 2 drops in the affected ear prn x 5 days   • carbidopa-levodopa (SINEMET CR)  mg TBCR per ER tablet Take 2 tablets by mouth 2 (two) times a day Take morning dose 7 Am and afternoon dose at 4 PM   • carboxymethylcellulose (REFRESH PLUS) 0 5 % SOLN 1 drop 3 (three) times a day as needed for dry eyes   • Cholecalciferol (Vitamin D3) 50 MCG (2000 UT) TABS Take 1,000 Units by mouth in the morning   • citalopram (CeleXA) 40 mg tablet Take 1 tablet by mouth daily   • DIABETIC TUSSIN  MG/5ML oral liquid TAKE 2 TEASPOONSFUL (10ML) BY MOUTH EVERY 4 HOURS AS NEEDED FOR COUGH & CONGESTION   • divalproex sodium (DEPAKOTE SPRINKLE) 125 MG capsule 4 capsules 2 (two) times a day 4 caps BID   • docosanol (ABREVA) 10 % Apply topically 5 (five) times a day Apply and rub in 5x a day until healed as needed for lesion   • ferrous sulfate 324 (65 Fe) mg Take 1 tablet by mouth 2 (two) times a day   • fexofenadine (ALLEGRA) 30 MG/5ML suspension Take 30 mL (180 mg total) by mouth daily as needed (seasonal allergies)   • fluticasone (FLONASE) 50 mcg/act nasal spray 1 spray into each nostril daily as needed for rhinitis or allergies   • glimepiride (AMARYL) 1 mg tablet Take 1 tablet (1 mg total) by mouth daily with breakfast   • hydrocortisone 1 % cream Apply topically to affected area twice daily as needed for rash   • levothyroxine (SYNTHROID) 25 mcg tablet Take 1 tablet (25 mcg) by mouth every morning   • LORazepam (ATIVAN) 0 5 mg tablet Take 0 5 mg by mouth daily Every day at 8pm   • metFORMIN (GLUCOPHAGE) 1000 MG tablet Take 1 tablet (1,000 mg total) by mouth 2 (two) times a day with meals   • neomycin-bacitracin-polymyxin b (NEOSPORIN) ointment Apply 1 application topically 2 (two) times a day as needed Apply to affected area BID PRN   • nitrofurantoin (MACRODANTIN) 50 mg capsule Take 1 capsule by mouth daily at bedtime   • olopatadine HCl (PATADAY) 0 2 % opth drops Administer 1 drop to both eyes as needed Instill 1 drop into affected eyes daily PRN for eye redness   • omeprazole (PriLOSEC) 20 mg delayed release capsule Take 1 capsule (20 mg total) by mouth 2 (two) times a day before meals   • Oxybutynin Chloride (DITROPAN XL PO) Take 5 mg by mouth 3 (three) times a day 1 tablet TID   • polyethylene glycol (GLYCOLAX) 17 GM/SCOOP powder DISSOLVE 17GM (1 CAPFUL) IN 8 OZ FLUIDS AND CONSUME BY MOUTH EVERY MORNING *HOLD 1 DAY FOR LOOSE STOOLS* (CONSTIPATION)   • simvastatin (ZOCOR) 20 mg tablet Take 1 tablet (20 mg total) by mouth daily with dinner at 4 pm for hyperlipidemia   • Starch, Thickening, POWD Take by mouth daily Use on food and liquid as directed   • traZODone (DESYREL) 100 mg tablet Take 1 tablet (100 mg total) by mouth daily at  8 pm for depression   • Vitamins A & D (VITAMIN A & D) ointment Apply topically as needed for skin irritation   • [DISCONTINUED] denosumab (PROLIA) 60 mg/mL Inject 1 mL (60 mg total) under the skin once for 1 dose   • [DISCONTINUED] linaGLIPtin (Tradjenta) 5 MG TABS Take 5 mg by mouth daily (Patient not taking: Reported on 5/16/2023)   • [DISCONTINUED] oxybutynin (DITROPAN) 5 mg tablet  (Patient not taking: Reported on 5/16/2023)   • [DISCONTINUED] valACYclovir (VALTREX) 1,000 mg tablet Take 1 tablet (1,000 mg total) by mouth 2 (two) times a day for 7 days (Patient not taking: Reported on 5/16/2023)       Objective     /73 (BP Location: Left arm, Patient Position: Sitting, Cuff Size: Child)   Pulse 86   Temp 98 9 °F (37 2 °C) (Temporal)   LMP 11/29/2009 (Exact Date)   SpO2 98%     Physical Exam  Vitals reviewed  Constitutional:       General: She is not in acute distress  Appearance: She is normal weight  She is not ill-appearing, toxic-appearing or diaphoretic  Comments: Alert, eyes open, patient is nonverbal but makes eye contact and tracks    HENT:      Right Ear: External ear normal       Left Ear: External ear normal       Nose: Nose normal    Eyes:      General: Lids are normal  Gaze aligned appropriately  Extraocular Movements: Extraocular movements intact  Conjunctiva/sclera: Conjunctivae normal       Comments: L sided cloudy pupil   Cardiovascular:      Rate and Rhythm: Normal rate and regular rhythm        Heart sounds: Normal heart sounds  No murmur heard  Pulmonary:      Effort: Pulmonary effort is normal  No tachypnea, bradypnea, accessory muscle usage, prolonged expiration, respiratory distress or retractions  Breath sounds: Normal breath sounds  Abdominal:      General: Abdomen is flat  Bowel sounds are normal  There is no distension  Musculoskeletal:         General: Deformity (wrist contracture on right, in brace) present  Comments: R arm contracture present    Skin:     General: Skin is warm  Coloration: Skin is not ashen, cyanotic, jaundiced, mottled, pale or sallow  Findings: No abrasion, burn, erythema, laceration or wound  Neurological:      Mental Status: She is alert  Coordination: Coordination abnormal       Gait: Gait abnormal (in powered wheelchair)  Comments: R hand in splint, L sided constant tremor      Psychiatric:      Comments: unable to assess patient is nonverbal        Luz Maria Beal MD

## 2023-06-30 NOTE — ASSESSMENT & PLAN NOTE
Last TSH WNL at 2 68 on 9/14  Tolerating levothyroine 25mcg well       - continue medications  - consider TSH recheck next visit

## 2023-06-30 NOTE — ASSESSMENT & PLAN NOTE
Shingles episode 4/24   Per CDC guidelines, you do not have to wait after shingles infection to administer the shingles vaccine     - shingles vaccine at next visit

## 2023-07-01 DIAGNOSIS — M81.0 OSTEOPOROSIS: Primary | ICD-10-CM

## 2023-07-01 RX ORDER — MELATONIN
1000 DAILY
Qty: 90 TABLET | Refills: 3 | Status: SHIPPED | OUTPATIENT
Start: 2023-07-01

## 2023-07-05 ENCOUNTER — TELEPHONE (OUTPATIENT)
Dept: NEUROLOGY | Facility: CLINIC | Age: 63
End: 2023-07-05

## 2023-07-05 ENCOUNTER — OFFICE VISIT (OUTPATIENT)
Dept: OCCUPATIONAL THERAPY | Facility: CLINIC | Age: 63
End: 2023-07-05
Payer: MEDICARE

## 2023-07-05 DIAGNOSIS — G80.3 DYSTONIC CEREBRAL PALSY (HCC): ICD-10-CM

## 2023-07-05 DIAGNOSIS — G20 PARKINSON'S DISEASE (HCC): Primary | ICD-10-CM

## 2023-07-05 PROCEDURE — 97140 MANUAL THERAPY 1/> REGIONS: CPT

## 2023-07-05 PROCEDURE — 97112 NEUROMUSCULAR REEDUCATION: CPT

## 2023-07-05 NOTE — TELEPHONE ENCOUNTER
Patient's caregiver, Isra Jo is requesting that some please call her back concerning the MRI results 05.31.2023.    [phone]

## 2023-07-05 NOTE — PROGRESS NOTES
OCCUPATIONAL THERAPY PROGRESS NOTE #1    2023  Tha Smith  1960  [id number]  Masha Ends, DO  1. Parkinson's disease ([address])    2. Cerebral palsy, unspecified type ([address])        Subjective "Hi"     *patient has verbal language limitations*    PATIENT GOAL: Pt unable to state goal      Assessment/Plan    Skilled Analysis:  Pt is a 63 y.o. female referred to Occupational Therapy s/p Parkinson's disease ([address]) Sofia Benavides. Pt participated in first skilled OT PN this date. Pt's caregiver reports that she feels that pt is doing better. Pt's caregiver reports improvements in pt's ability to use (open/close) her R hand. Through clinical observation, pt was observed to actively stretch her hand and extend her fingers ~50% of the way to full extension which caregiver reports pt was unable to do prior to therapy. Pt observed to have increased motivation this date, continuing to require >75% A verbal cueing to increase motivation for task completion. Following clinical observation, pt presents with the following areas of improvement: RUE shoulder flexion/extension. It should be noted that pt may have been able to achieve the equivalent RUE ROM during initial evaluation, however due to decreased motivation, pt did not demonstrate during initial evaluation. Pt continues to present with the following areas of deficit: FMC/FMS, GMC/GMS, hand to target accuracy, in hand manipulation/dexterity and apraxia impacting indep and completion of ADL/IADL, salient, and leisure tasks. Pt continues to demo the need for skilled Occupational Therapy services 2x/week for 4 weeks with focus on UE NMR, UE strengthening, UE endurance, FMC/GMC, FMS/GMS and leisure pursuits to address the goals as listed below. Pt in agreement with POC, POC to  w/in 30 days. Duration in weeks: 8  Plan of care beginning date: 23  Plan of care expiration date: 23      Goals:   Motor Short Term Goals (4 weeks) Expiration Date: 7/5/23      Strength/Endurance    · Pt will demo with G tolerance to supine, seated, and in stance exercise x 10 minutes with minimal rest breaks required for increased engagement in life roles and weekly exercise regimen PROGRESSING    · Pt will demo with G carryover of Home Exercise Program to improve functional progression towards goals in Plan of care and for improved functional use of RUE PROGRESSING        FMC/GMC    · Pt will increase BUE rate of manipulation by 3% for all FM tests for improved functional performance with salient tasks NOT MET    · Pt will increase Reaction time  to < 2 seconds with hand to target timed trials for improved reaction time and automaticity of coordination for improved functional performance with ADLs/IADLs and salient tasks NOT MET    · Pt will increase RUE prehension patterns for improved utensil and container management  with <50% droppage NOT MET    · Pt will demo with decreased BUE ataxia with functional reach for normalized movement pattern of  RUE and for improved hand to target accuracy x 30% PROGRESSING    · Pt will demo improved motor learning of constructional and new motor actions for improved b/l coordination and motor planning evident by 30% accuracy for fxnl ADL/IADL performance PROGRESSING        Functional Performance    · Pt will increase RUE to independent stabilizer for ADL/IADL and tabletop tasks for improved functional performance of life roles and salient tasks PROGRESSING    · Pt will demo G comprehension of adaptive equipment (long handled reacher/sponge/shoehorn, toileting aid) use in clinic to improve independence in ADL management in home environment GOAL ENDED      AROM/PROM    · Pt will be able to perform full range of proximal RUE to demo increased strength and ROM of affected UE for improved ADLs/IADLs PROGRESSING    · Pt will be able to perform 3/4 range of distal RUE to demo increased strength and ROM of affected UE for improved ADLs/IADLs PROGRESSING    · Pt will demo good carryover of clinic and home tone reduction strategies for improved AROM initiation with functional reach with ADL fxn PROGRESSING      Modalities    · Pt will increase compliance with [facility] for improved elasticity of RUE and tolerate fit of resting hand  splint with wear time of 2 hrs PROGRESSING       Goals:   Motor Long Term Goals (8 weeks) Expiration Date: 8/5/23      Strength/Endurance    · Pt will demo with G tolerance to supine, seated, and in stance exercise x 20 minutes with minimal rest breaks required for increased engagement in life roles and weekly exercise regimen     · Pt will demo with G carryover of Home Exercise Program to improve functional progression towards goals in Plan of care and for improved functional use of RUE         FMC/GMC    · Pt will increase BUE rate of manipulation by 5% for all FM tests for improved functional performance with salient tasks     · Pt will increase Reaction time  to < 1.7 seconds with hand to target timed trials for improved reaction time and automaticity of coordination for improved functional performance with ADLs/IADLs and salient tasks    · Pt will increase RUE prehension patterns for improved utensil and container management  with <30% droppage     · Pt will demo with decreased BUE ataxia with functional reach for normalized movement pattern of  RUE and for improved hand to target accuracy x 50%    · Pt will demo improved motor learning of constructional and new motor actions for improved b/l coordination and motor planning evident by 50% accuracy for fxnl ADL/IADL performance        Functional Performance    · Pt will increase RUE to gross assist for ADL/IADL and tabletop tasks for improved functional performance of life roles and salient tasks     · Pt will demo G comprehension of adaptive equipment (long handled reacher/sponge/shoehorn, toileting aid) use in clinic to improve independence in ADL management in home environment       AROM/PROM    · Pt will be able to perform full range of proximal RUE to demo increased strength and ROM of affected UE for improved ADLs/IADLs    · Pt will be able to perform full range of distal RUE to demo increased strength and ROM of affected UE for improved ADLs/IADLs    · Pt will demo good carryover of clinic and home tone reduction strategies for improved AROM initiation with functional reach with ADL fxn      Modalities    · Pt will increase compliance with [facility] for improved elasticity of RUE and tolerate fit of resting hand  splint with wear time of 4 hrs      Treatment Interventions  Supine, seated, and in stance neuro re-ed  Fxnl Grasp and Release  FMC/prehension patterns  Fxnl Tool use (tweezers, tongs)  In hand manipulation  [employer] [facility])  Timed Trials to improve automaticity  Manual tx (IASTM if able, PROM/stretching)  Hand to target tasks  Sensory re-ed as needed (sensory bins)  Seated functional reach: crossing midline  Supine place and hold  WBearing strategies  (seated, Prone/quad)  Closed chain activities  Open chain activities  RHS fabrication      Pain Levels:     Restin    With Activity:  0- does not like when charge nurse stretches fingers    Objective    Impairment Observations:    Upper Extremity       RUE LUE Comments                 UPPER EXTREMITY FXN IMPAIRED INTACT Dominant Hand: R                         /Pinch Strength         Dynamometer      - Gross Grasp 0 lbs 0 lbs low; able to grasp handle, unable to squeeze   Pinch Meter       - PINCER 0 lbs 0 lbs     - TRIPOD 0 lbs 0 lbs     - LATERAL 0 lbs  0 lbs             AROM (seated)   Caregiver reports pt is able to complete AROM, however pt did not demonstrate during PN   Scapular Protraction      Scapular Retraction      Elevation/Depression      Internal Rotation      External Rotation      Shoulder Abduction      Shoulder Adduction      Shoulder Flexion 1/2 3/4 IMPROVED   Shoulder Extension FULL FULL IMPROVED   Horizontal Abduction      Horizontal Adduction      Elbow Flexion      Elbow Extension      Pronation      Supination      Wrist Flexion      Wrist Extension      Digit Flexion      Digit Extension      Composite Grasp      Hook Grasp      Opposition      Subluxation                                            PROM (seated position)         Elevation/Depression Unable to assess Unable to assess    Scapular Protraction Unable to assess Unable to assess    Scapular Retraction Unable to assess Unable to assess    Shoulder Abduction 3/4 full maintained   Shoulder Adduction full full maintained   Shoulder Flexion 3/4 full maintained   Shoulder Extension full full maintained   Horizontal Abduction 1/2 3/4 maintained   Horizontal Adduction 1/2 3/4 maintained   Elbow Flexion 3/4 full maintained   Elbow Extension 1/2 full maintained   Wrist Flexion 3/4 3/4 maintained   Wrist Extension 1/2 3/4 maintained   Digit Flexion 1/2 1/2 maintained   Digit Extension 1/2 1/2 maintained                                  MMT  unable to assess due to pt not able to understand the directions  unable to assess due to pt not able to understand the directions     Elevation      Shoulder Abduction      Shoulder Adduction      Shoulder Flexion      Shoulder Extension      Elbow Flexion      Elbow Extension      Wrist Flexion      Wrist Extension      Gross Grasp            SENSATION      Myofilaments (3.61 Wnl) Unable to assess Unable to assess     Sharp Dull  INTACT INTACT    Proprioception  unable to assess due to pt not able to understand the directions  unable to assess due to pt not able to understand the directions    Hot/Cold Temp INTACT INTACT    COORDINATION      9 Hole Peg Test  was unable to assess due to to pt not engaging in task   attempted to assess, however pt did not participate    Fxnl Dexterity Test Did not assess after lack of participation in 9 hole peg test Did not assess after lack of participation in 9 hole peg test      Today's Treatment:  1. At tabletop, pt used R hand to grasp colored foam blocks and place to target into upside down cones positioned in various planes to facilitate shoulder flexion/extension and horizontal abduction/adduction as well as functional reaching and active grasp/release. Pt requiring >75% A verbal cueing for task completion due to low motivation. 2. Pt tolerated retrograde massage distal to proximal UE to improve tissue circulation, dec swelling and improve tissue elasticity/flexibility. No evidence of pain with manual treatment.        INTERVENTION COMMENTS:  Diagnosis: Parkinson's disease ([address]) [G20]  Precautions: difficulty speaking, history of seizures; NO STIM  FOTO: Not completed  Insurance: Payor: [facility] REP / Plan: Jordi Meter / Product Type: Medicare HMO /   7 of 10 visits, PN due 8/5/23  POC expires: 8/5/23

## 2023-07-07 ENCOUNTER — APPOINTMENT (OUTPATIENT)
Dept: OCCUPATIONAL THERAPY | Facility: CLINIC | Age: 63
End: 2023-07-07
Payer: MEDICARE

## 2023-07-12 ENCOUNTER — TELEPHONE (OUTPATIENT)
Dept: FAMILY MEDICINE CLINIC | Facility: CLINIC | Age: 63
End: 2023-07-12

## 2023-07-12 ENCOUNTER — TELEPHONE (OUTPATIENT)
Dept: NEUROLOGY | Facility: CLINIC | Age: 63
End: 2023-07-12

## 2023-07-12 NOTE — TELEPHONE ENCOUNTER
Patient is scheduled for BINJ 8/1 in the [facility] office. Called [facility] and spoke to Ryan. Botox 200 UNITS  Qty. 1  Scheduled: 7/26  Location: [facility]  Via: [employer]       Please let us know if Botox does not arrive. Thanks!

## 2023-07-20 ENCOUNTER — TELEPHONE (OUTPATIENT)
Dept: FAMILY MEDICINE CLINIC | Facility: CLINIC | Age: 63
End: 2023-07-20

## 2023-07-20 DIAGNOSIS — M81.0 OSTEOPOROSIS, UNSPECIFIED OSTEOPOROSIS TYPE, UNSPECIFIED PATHOLOGICAL FRACTURE PRESENCE: Primary | ICD-10-CM

## 2023-07-20 NOTE — TELEPHONE ENCOUNTER
Just to clarify, so they need the new order because the blood was already drawn? Chart reviewed, she needed the bmp last month because of the Prolia injection.

## 2023-07-20 NOTE — TELEPHONE ENCOUNTER
Called Leila. Per Patrizia Filter patient caregiver came to lab with a script for BMP. Patrizia Filter she was told by the caregiver this labs was done in June 14,2023 was to early. Patrizia Filter state she already withdraw the blood and now patient need an new order placed in the system today. After review patient chart the last lab was on 7/13/2022 and there is no other order placed in the system after that.

## 2023-07-20 NOTE — TELEPHONE ENCOUNTER
Marlys Pearson is calling from [employer] and patient has script for lab work but nothing in the system    Marlys Pearson can be reached at [phone]

## 2023-07-21 NOTE — TELEPHONE ENCOUNTER
Hello, sorry for the delay. New BMP order was entered. Do we know if the blood draw patient got yesterday is saved or does the patient need to get another blood draw? Thank you!

## 2023-07-21 NOTE — TELEPHONE ENCOUNTER
Pt needs a new post proilia injection BMP order for the lab they accidentally discontinued the 2 orders that were in there please call rex when completed

## 2023-07-25 ENCOUNTER — APPOINTMENT (OUTPATIENT)
Dept: LAB | Facility: CLINIC | Age: 63
End: 2023-07-25
Payer: MEDICARE

## 2023-07-25 DIAGNOSIS — M81.0 OSTEOPOROSIS, UNSPECIFIED OSTEOPOROSIS TYPE, UNSPECIFIED PATHOLOGICAL FRACTURE PRESENCE: ICD-10-CM

## 2023-07-25 LAB
ANION GAP SERPL CALCULATED.3IONS-SCNC: 7 MMOL/L
BUN SERPL-MCNC: 15 MG/DL (ref 5–25)
CALCIUM SERPL-MCNC: 9.7 MG/DL (ref 8.3–10.1)
CHLORIDE SERPL-SCNC: 106 MMOL/L (ref 96–108)
CO2 SERPL-SCNC: 30 MMOL/L (ref 21–32)
CREAT SERPL-MCNC: 0.91 MG/DL (ref 0.6–1.3)
GFR SERPL CREATININE-BSD FRML MDRD: 68 ML/MIN/1.73SQ M
GLUCOSE SERPL-MCNC: 197 MG/DL (ref 65–140)
POTASSIUM SERPL-SCNC: 4 MMOL/L (ref 3.5–5.3)
SODIUM SERPL-SCNC: 143 MMOL/L (ref 135–147)

## 2023-07-25 PROCEDURE — 80048 BASIC METABOLIC PNL TOTAL CA: CPT

## 2023-07-25 PROCEDURE — 36415 COLL VENOUS BLD VENIPUNCTURE: CPT

## 2023-07-26 NOTE — TELEPHONE ENCOUNTER
Botox number of units: 100  Botox quantity: 5  Arrived at what location: [facility]  Botox at Correct Administering Location: YES  NDC number: 3210-3592-80  Lot number: [id number]  Expiration Date: 11/30/2025  Appt notes indicate correct medication: YES

## 2023-07-31 ENCOUNTER — TELEPHONE (OUTPATIENT)
Dept: NEUROLOGY | Facility: CLINIC | Age: 63
End: 2023-07-31

## 2023-07-31 NOTE — PROGRESS NOTES
Physical Medicine & Rehabilitation Spasticity Follow-up/Procedure  Mariann Echeverria 66 y.o. female    ASSESSMENT/PLAN:   Werner Barrios was seen today for botulinum toxin injection. Diagnoses and all orders for this visit:    Dystonic cerebral palsy ([address])    Mixed cerebral palsy ([address])      Ms. Cheryle Bright is a 65yo F  cerebral palsy with toe walking, bipolar disorder/mood disturbance with impulse control, now with Parkinsonism (possibly secondary in part to psychiatric meds), and some EPS (tardive dyskinesia it looks like). Would classify her as dystonic cerebral palsy vs. mixed pattern CP.     Since last seen, a lot of the goals we were trying to target for this patient have been ameliorated with her new custom wheelchair. Her feet stay on the foot plates with foot straps, she has an adductor bolster to prevent adduction. Team is able to easily provide marin-care/hygiene. She continues to go into an extensor posture when going to stand, and I briefly discussed possibility of redistributing botulinum toxin to her gastrosoleus complex, but these moments are brief, she is able to perform most of her activities in her chair, and her staff are not having a difficult time with her transfers. Because of this, we decided not to pursue botulinum toxin to her lower extremities given lack of clear goals for her at this time. I am not sure what acute process caused the change in her R wrist - whether there was an acute soft tissue injury, or strain that resulted in increased spasms, which was also painful? Hard to say. I do agree with at least MRI C-Spine, and it may be worth doing this under sedation. She would need clearance for that sedation from her primary provider. I will reach out to Neuro to see if they would like to order the MRI C-Spine with sedation.  At today's visit she appears to have dystonia with cocontraction of wrist extensors and wrist flexors maintaining her in neutral. Using her resting hand splint seems to help, and she is regaining finger extension with activities that staff is providing her at her facility. They are also in the process of trying to appeal and get her some time with OT. The staff at her day program have also been giving her activities today with that R hand to improve her use. This is an appropriate way to manage this. I briefly talked about botulinum toxin to her wrist flexors/finger flexors, but at this time, it is reasonable to pursue more conservative measures. At this time, we decided to hold off on pursuing additional botulinum toxin. Would continue Baclofen 10mg TID, and will plan to follow-up with her in 4 months. I told them what things to look out for that would warrant coming in sooner for possible repeat botulinum toxin.      Consent needs to be signed by her  who is her POA.     *I have spent 40 minutes with Caregiver today in which greater than 50% of this time was spent in counseling/coordination of care regarding Risks and benefits of tx options, Instructions for management, Patient and family education, Importance of tx compliance, Impressions, Counseling / Coordination of care, Documenting in the medical record and Communicating with other healthcare professionals . HPI/SUBJECTIVE:  Last time was here with  - Manav Medina. This time here with Zander her long time caregiver    5/12 and 5/11 she was noted to have increased wrist flexion tone and finger flexion and and difficulty with wrist extension. The week before no change in her function - ability to reach out, open fingers and move her wrist. Her caregiver - other than that - did not notice any changes in her function. They did notice swelling in her wrist at that time. They saw her family medicine doctor on 5/12 - who ordered XR R hand/wrist which was unremarkable. She was seen by Neurology on 5/16 who recommended EMG and C-Spine MRI.  They haven't really been able to get good imaging due to some lip smacking/movement/anxiety/fear behaviors - but MRI Brain didn't appear to have any new lesions. They were unable to get the MRI C-Spine. Since then, she reports that that has been gradually improving - they have been giving her more things to do with that hand and have been using a resting splint with much improvement and less perceived discomfort. Many of the issues that we were trying to use botulinum toxin to address have been mitigated by her new wheelchair. It has adductor stop/bolster to prevent adduction, and staff is easily able to access marin-region for marin-care/hygiene. She does remain with significant plantarflexion, but now has foot straps on her wheelchair that prevent injury/feet falling off foot plate. The supports on the custom seating/back allow her to sit more upright. She doesn't require as many transfers in this wheelchair and they are able to perform these with ease, although she remains plantarflexed throughout the stand pivot. They are not sure about pursuing further botulinum toxin. Last seen for chemodenervation: 4/11/2023  Results of chemodenervation: Not sure they noticed much change  How long did effects last: N/A  Side affect/Adverse Effects: None    Any issues with driving, swallowing, appetite: Does not drive. Uses medical transport. Long term issues with swallowing with dysphagia diet/modified consistencies diet which is stable. Appetite is stable. Stretching/Exercise Program: PROM BID with staff  Currently in therapy: Out of benefits - they are trying to appeal to get her back in with OT. Any falls with significant injuries: None  Any new weakness: None  Any changes to care since last seen: None  Safe at home: Yes  Any oral meds: Baclofen 10mg TID. Very sensitive to medications  On anticoagulation/blood thinners: None  Current functional status:   Max A with all function  ADLs require up to 2 person assist  Primary wheelchair mobility   Transfers with assist - trying to do more stand pivots  Able to stand up to a minute with assistance     ROS: A 10 point review of systems was negative except for what is noted in the HPI. Imaging: I have personally reviewed imaging with results as follows: No new pertinent imagine since last seen. OBJECTIVE:   /68 (BP Location: Right arm, Patient Position: Sitting, Cuff Size: Standard)   Pulse 79   Temp 98 °F (36.7 °C) (Temporal)   LMP 11/29/2009 (Exact Date)     Gen: No acute distress, Well-nourished, well-appearing. HEENT: Moist mucus membranes, Normocephalic/Atraumatic  Cardiovascular: diistal pulses palpable  Heme/Extr: No edema  Pulmonary: Non-labored breathing. Lungs CTAB  : No boateng  GI: Soft, non-tender, non-distended. BS+  MSK:   R wrist without edema, but with co-contraction with wrist in neutral at this time. Patient able to actively open her fingers to almost full extension. She seemed to have some discomfort when I was trying to range her wrist.    Integumentary: Skin is warm, dry. Psych: Normal mood and affect. Neuro: Awake, alert. Global aphasia with difficulty performing true MMT due to dystonic posturing. Feet seemed to tend toward plantarflexion. Adductor bolster keeps her legs  (photos show tendency to adduction worse on the R than the L). She has repetitive oral movements that look like tardive dyskinesia. ?     The following portions of the patient's history were reviewed and updated as appropriate: allergies, current medications, past family history, past medical history, past social history, past surgical history and problem list.      Current Outpatient Medications:   •  ARIPiprazole (ABILIFY) 2 mg tablet, Take 1 tablet by mouth daily at bedtime, Disp: , Rfl:   •  baclofen 10 mg tablet, Take 1 tablet (10 mg total) by mouth 3 (three) times a day, Disp: 90 tablet, Rfl: 11  •  benzonatate (TESSALON PERLES) 100 mg capsule, TAKE 1 CAPSULE BY MOUTH EVERY 8 HOURS AS NEEDED FOR COUGH **DO NOT CRUSH**, Disp: 5 capsule, Rfl: 11  •  Botox 100 units, INJECT UP  UNITS IN THE MUSCLE INTO LOWER AND UPPER EXTREMITIES ONCE EVERY 3 MONTHS, Disp: 3 each, Rfl: 0  •  calcium carbonate (OS-MORRIS) 600 MG tablet, Take 1 tablet (600 mg total) by mouth 2 (two) times a day with meals, Disp: 60 tablet, Rfl: 2  •  carbamide peroxide (DEBROX) 6.5 % otic solution, 2 drops 2 drops in the affected ear prn x 5 days, Disp: , Rfl:   •  carbidopa-levodopa (SINEMET CR)  mg TBCR per ER tablet, Take 2 tablets by mouth 2 (two) times a day Take morning dose 7 Am and afternoon dose at 4 PM, Disp: 360 tablet, Rfl: 3  •  carboxymethylcellulose (REFRESH PLUS) 0.5 % SOLN, 1 drop 3 (three) times a day as needed for dry eyes, Disp: , Rfl:   •  cholecalciferol (VITAMIN D3) 1,000 units tablet, Take 1 tablet (1,000 Units total) by mouth daily, Disp: 90 tablet, Rfl: 3  •  citalopram (CeleXA) 40 mg tablet, Take 1 tablet by mouth daily, Disp: , Rfl:   •  DIABETIC TUSSIN  MG/5ML oral liquid, TAKE 2 TEASPOONSFUL (10ML) BY MOUTH EVERY 4 HOURS AS NEEDED FOR COUGH & CONGESTION, Disp: 118 mL, Rfl: 11  •  divalproex sodium (DEPAKOTE SPRINKLE) 125 MG capsule, 4 capsules 2 (two) times a day 4 caps BID, Disp: , Rfl:   •  docosanol (ABREVA) 10 %, Apply topically 5 (five) times a day Apply and rub in 5x a day until healed as needed for lesion, Disp: , Rfl: 0  •  ferrous sulfate 324 (65 Fe) mg, Take 1 tablet by mouth 2 (two) times a day, Disp: , Rfl:   •  fexofenadine (ALLEGRA) 30 MG/5ML suspension, Take 30 mL (180 mg total) by mouth daily as needed (seasonal allergies), Disp: 237 mL, Rfl: 3  •  fluticasone (FLONASE) 50 mcg/act nasal spray, 1 spray into each nostril daily as needed for rhinitis or allergies, Disp: 1 Bottle, Rfl: 1  •  glimepiride (AMARYL) 1 mg tablet, Take 1 tablet (1 mg total) by mouth daily with breakfast, Disp: 90 tablet, Rfl: 3  •  hydrocortisone 1 % cream, Apply topically to affected area twice daily as needed for rash, Disp: 30 g, Rfl: 0  •  levothyroxine (SYNTHROID) 25 mcg tablet, Take 1 tablet (25 mcg) by mouth every morning, Disp: , Rfl: 0  •  LORazepam (ATIVAN) 0.5 mg tablet, Take 0.5 mg by mouth daily Every day at 8pm, Disp: , Rfl:   •  metFORMIN (GLUCOPHAGE) 1000 MG tablet, Take 1 tablet (1,000 mg total) by mouth 2 (two) times a day with meals, Disp: 60 tablet, Rfl: 2  •  neomycin-bacitracin-polymyxin b (NEOSPORIN) ointment, Apply 1 application topically 2 (two) times a day as needed Apply to affected area BID PRN, Disp: , Rfl:   •  nitrofurantoin (MACRODANTIN) 50 mg capsule, Take 1 capsule by mouth daily at bedtime, Disp: , Rfl:   •  olopatadine HCl (PATADAY) 0.2 % opth drops, Administer 1 drop to both eyes as needed Instill 1 drop into affected eyes daily PRN for eye redness, Disp: , Rfl:   •  omeprazole (PriLOSEC) 20 mg delayed release capsule, Take 1 capsule (20 mg total) by mouth 2 (two) times a day before meals, Disp: 60 capsule, Rfl: 2  •  Oxybutynin Chloride (DITROPAN XL PO), Take 5 mg by mouth 3 (three) times a day 1 tablet TID, Disp: , Rfl:   •  polyethylene glycol (GLYCOLAX) 17 GM/SCOOP powder, DISSOLVE 17GM (1 CAPFUL) IN 8 OZ FLUIDS AND CONSUME BY MOUTH EVERY MORNING *HOLD 1 DAY FOR LOOSE STOOLS* (CONSTIPATION), Disp: 510 g, Rfl: 10  •  simvastatin (ZOCOR) 20 mg tablet, Take 1 tablet (20 mg total) by mouth daily with dinner at 4 pm for hyperlipidemia, Disp: , Rfl:   •  Starch, Thickening, POWD, Take by mouth daily Use on food and liquid as directed, Disp: 1020 g, Rfl: 3  •  traZODone (DESYREL) 100 mg tablet, Take 1 tablet (100 mg total) by mouth daily at  8 pm for depression, Disp: 90 tablet, Rfl: 0  •  Vitamins A & D (VITAMIN A & D) ointment, Apply topically as needed for skin irritation, Disp: , Rfl:     Past Surgical History:   Procedure Laterality Date   • ABDOMINAL SURGERY     • COLECTOMY MIN      re-anastomosis 7/22/16   • COLONOSCOPY N/A 7/21/2016    Procedure: COLONOSCOPY;  Surgeon: Cayetano Hall MD Naa;  Location: [facility]; Service:    • COLONOSCOPY N/A 1/31/2016    Procedure: COLONOSCOPY;  Surgeon: Gorman Goldberg, MD;  Location: [facility];  Service:    • COLONOSCOPY N/A 7/25/2017    Procedure: COLONOSCOPY;  Surgeon: Gorman Goldberg, MD;  Location: [facility];   Service: Colorectal   • COLOSTOMY     • EXPLORATORY LAPAROTOMY W/ BOWEL RESECTION N/A 1/31/2016    Procedure: exploratory laparotomy, left sigmoidectomy, coloproctostomy, take down splenic flexure, loop colostomy;  Surgeon: Gorman Goldberg, MD;  Location[facility];  Service:    • PA CLOSURE ENTEROSTOMY LG/SMALL INTESTINE N/A 7/22/2016    Procedure: SEGMENTAL COLECTOMY WITH COLOCOLOSTOMY;  Surgeon: Gorman Goldberg, MD;  Location[facility];  Service: Colorectal   • UPPER GASTROINTESTINAL ENDOSCOPY         Patient Active Problem List    Diagnosis Date Noted   • Cataract 06/30/2023   • Internal hemorrhoids 06/20/2023   • Contracture of right hand 05/12/2023   • Need for shingles vaccine 03/31/2023   • Viral illness 11/04/2022   • Encounter for annual routine gynecological examination 10/07/2022   • Dystonic cerebral palsy ([address]) 04/19/2022   • Cervical dystonia 04/19/2022   • Parkinsonism ([address]) 01/21/2022   • 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[address]) 10/18/2021   • Polyneuropathy associated with underlying disease ([address]) 10/18/2021   • Hypercalcemia 08/19/2021   • Herpes zoster without complication 18/19/5978   • Abnormal finding on lung imaging 04/06/2021   • Macrocytic anemia 48/77/0267   • Eosinophilic leukocytosis 77/69/5993   • Underweight 09/03/2020   • Dysphagia 08/01/2020   • History of vitamin D deficiency 01/22/2020   • History of fall 10/28/2019   • Breast asymmetry 07/09/2019   • Hypernatremia 12/28/2018   • Severe sepsis ([address]) 12/17/2018   • Gait disturbance 06/26/2018   • Osteoarthritis of both hips 06/08/2018   • Severe Intellectual disability 04/19/2018   • Type 2 diabetes mellitus, without long-term current use of insulin ([address]) 04/19/2018   • Abnormal albumin 09/18/2017   • Peripheral neuropathy 09/18/2017   • Seasonal allergies 06/29/2017   • Physical deconditioning 05/09/2017   • Hyperlipidemia 03/27/2017   • Gastroesophageal reflux disease 03/27/2017   • Mixed cerebral palsy ([address]) 03/27/2017   • Spasticity 03/27/2017   • Ambulatory dysfunction 03/27/2017   • Recurrent UTI (urinary tract infection) 03/27/2017   • Bipolar disorder ([address]) 03/27/2017   • Hypotension 04/11/2016   • 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[address]) 02/01/2016   • Osteoporosis 01/19/2016   • Resting tremor 01/19/2016   • Gait abnormality 12/11/2015   • Other chronic pain 07/30/2015   • Anxiety 07/15/2015   • Menopause 09/25/2014   • Chondrodermatitis nodularis helicis of left ear 89/22/1898   • Atopic dermatitis 06/02/2014   • Dry eye 06/02/2014   • Constipation 04/12/2013   • Iron deficiency anemia 03/27/2013   • Urinary incontinence 03/27/2013   • Hypothyroidism 01/10/2013

## 2023-07-31 NOTE — TELEPHONE ENCOUNTER
----- Message from Alejandra Beltrán, [facility] sent at 6/6/2023 11:03 AM EDT -----  Please let staff know that picture was limited due to patient motion but no stroke ( I know that was their concern).  She dose have cerebral and cerebellar atrophy but would not have causes sudden event that was described at visit

## 2023-07-31 NOTE — TELEPHONE ENCOUNTER
Called and spoke with Zander. Reviewed results. She states there was a lot of motion and they were unable to complete the MRI of the c-spine. She is not sure if Gerhardt Lessen thinks doing it with sedation would be a good option to redo brain MRI and complete the cspine MRI. She does state the patient has been doing OT so is doing a little better but was not sure how the office wanted to proceed.      Patient is scheduled to see Dr. Terence Rushing for botox on 8/1 so they had planned on discussing this with her as well     Please advise

## 2023-08-01 ENCOUNTER — PROCEDURE VISIT (OUTPATIENT)
Dept: NEUROLOGY | Facility: CLINIC | Age: 63
End: 2023-08-01
Payer: MEDICARE

## 2023-08-01 VITALS — TEMPERATURE: 98 F | SYSTOLIC BLOOD PRESSURE: 108 MMHG | HEART RATE: 79 BPM | DIASTOLIC BLOOD PRESSURE: 68 MMHG

## 2023-08-01 DIAGNOSIS — G80.8 MIXED CEREBRAL PALSY (HCC): ICD-10-CM

## 2023-08-01 DIAGNOSIS — G80.3 DYSTONIC CEREBRAL PALSY (HCC): Primary | ICD-10-CM

## 2023-08-01 PROCEDURE — 99214 OFFICE O/P EST MOD 30 MIN: CPT | Performed by: PHYSICAL MEDICINE & REHABILITATION

## 2023-08-01 NOTE — TELEPHONE ENCOUNTER
Since they have appt with PM&R today would have them discuss at that visit to see if risk of sedation would be beneficial since I have not seen the patient in the past several weeks since she has been doing more therapy.

## 2023-08-01 NOTE — PATIENT INSTRUCTIONS
No need for botulinum toxin in her legs at this time - a lot of the issues staff has has been ameliorated by her new wheelchair. Potentially botulinum toxin for wrist flexors/finger flexors if they worsen, but adequately managed on Baclofen and resting hand splint  Finish the EMG and would recommend getting the MRI C-Spine. Would likely need sedation for the MRI C-Spine. Likely would benefit from OT if it would be covered to increase function in the R hand.

## 2023-08-02 DIAGNOSIS — M21.339 WRIST DROP, UNSPECIFIED LATERALITY: ICD-10-CM

## 2023-08-02 DIAGNOSIS — G24.9 DYSTONIA: ICD-10-CM

## 2023-08-02 DIAGNOSIS — G80.3 DYSTONIC CEREBRAL PALSY (HCC): Primary | ICD-10-CM

## 2023-08-02 NOTE — TELEPHONE ENCOUNTER
Dr. Araceli Lucas forwarded me her visit note and agree MRI cspine with sedation would still be reasonable to obtain. I did place a new order for mri cspine with sedation. She will need to get clearance for the sedation from her PCP. Please let facility staff know and give them information for scheduling.

## 2023-08-02 NOTE — TELEPHONE ENCOUNTER
[facility], she states that patient was scheduled for 8/25 and they are working on getting clearance from PCP

## 2023-08-08 ENCOUNTER — OFFICE VISIT (OUTPATIENT)
Dept: FAMILY MEDICINE CLINIC | Facility: CLINIC | Age: 63
End: 2023-08-08

## 2023-08-08 VITALS
OXYGEN SATURATION: 96 % | TEMPERATURE: 98.2 F | HEART RATE: 82 BPM | DIASTOLIC BLOOD PRESSURE: 66 MMHG | SYSTOLIC BLOOD PRESSURE: 99 MMHG

## 2023-08-08 DIAGNOSIS — N95.0 POSTMENOPAUSAL VAGINAL BLEEDING: ICD-10-CM

## 2023-08-08 DIAGNOSIS — Z01.818 PREOP EXAMINATION: Primary | ICD-10-CM

## 2023-08-08 DIAGNOSIS — M24.541 CONTRACTURE OF RIGHT HAND: ICD-10-CM

## 2023-08-08 PROCEDURE — 99213 OFFICE O/P EST LOW 20 MIN: CPT | Performed by: FAMILY MEDICINE

## 2023-08-08 NOTE — PRE-PROCEDURE INSTRUCTIONS
Pre-Surgery Instructions:   Medication Instructions   • ARIPiprazole (ABILIFY) 2 mg tablet Take day of surgery. • baclofen 10 mg tablet Take day of surgery. • Botox 100 units Instructions provided by MD   • calcium carbonate (OS-MORRSI) 600 MG tablet Hold day of surgery. • carbidopa-levodopa (SINEMET CR)  mg TBCR per ER tablet Take day of surgery. • carboxymethylcellulose (REFRESH PLUS) 0.5 % SOLN Take day of surgery. • cholecalciferol (VITAMIN D3) 1,000 units tablet Hold day of surgery. • citalopram (CeleXA) 40 mg tablet Take day of surgery. • Denosumab (PROLIA SC) Instructions provided by MD   • divalproex sodium (DEPAKOTE SPRINKLE) 125 MG capsule Take day of surgery. • ferrous sulfate 324 (65 Fe) mg Hold day of surgery. • glimepiride (AMARYL) 1 mg tablet Hold day of surgery. • levothyroxine (SYNTHROID) 25 mcg tablet Take day of surgery. • LORazepam (ATIVAN) 0.5 mg tablet Uses PRN- OK to take day of surgery   • metFORMIN (GLUCOPHAGE) 1000 MG tablet Hold day of surgery. • nitrofurantoin (MACRODANTIN) 50 mg capsule Take day of surgery. • omeprazole (PriLOSEC) 20 mg delayed release capsule Take day of surgery. • Oxybutynin Chloride (DITROPAN XL PO) Take day of surgery. • polyethylene glycol (GLYCOLAX) 17 GM/SCOOP powder Hold day of surgery. • simvastatin (ZOCOR) 20 mg tablet Take night before surgery   • traZODone (DESYREL) 100 mg tablet Take night before surgery   • Vitamins A & D (VITAMIN A & D) ointment Hold day of surgery. The patient should have nothing to eat or drink after 11 pm the night before the MRI. The patient may take their medications with a sip of water at least 2 hours prior to their arrival time. You will receive a call the evening before your MRI appointment with additional instructions. Please leave your jewelry and valuables at home, wedding rings are the exception.    Please bring your physician order, insurance cards, a form of photo ID and a list of your medications with you. Arrive 15 minutes prior to your appointment time in order to register. Please bring any prior CT or MRI studies of this area that were not performed at a [facility].

## 2023-08-08 NOTE — ASSESSMENT & PLAN NOTE
First developed about 5 days ago, noticed with wiping with about 3 other instances. No further episodes since 2 days ago.   - will check TVUS, ordered today  - last papa 2021 WNL   - will need to follow up GYN exam

## 2023-08-08 NOTE — PROGRESS NOTES
[address] [facility]  1960    Steve Julien is a 66 y.o. female with hypothyroidism, cerebral palsy, parkinsonism, peripheral neuropathy, spastic quadriparesis who is planning to undergo MRI C-spine under general on 8/25/23 due to continued R hand contraction changes. She is here with her caretaker from [facility], Lisa. Patient had not had complications with anesthesia in the past.    ROS per caretaker, pt is nonverbal:   Chest pain: no   Shortness of breath: no  Shortness of breath with exertion: no longer ambulates, wheelchair bound  Orthopnea: no  Dizziness: no  Unexplained weight change: no    PMH:  CAD: no  HTN: no  CKD: no  DM: no on insulin: no  History of CVA: no     reports that she has never smoked. She has never used smokeless tobacco. She reports that she does not currently use alcohol. She reports that she does not use drugs. BP 99/66 (BP Location: Right arm, Patient Position: Sitting, Cuff Size: Child)   Pulse 82   Temp 98.2 °F (36.8 °C) (Temporal)   LMP 11/29/2009 (Exact Date)   SpO2 96%   Physical Exam  Vitals reviewed. Constitutional:       Appearance: Normal appearance. HENT:      Head: Normocephalic and atraumatic. Right Ear: External ear normal.      Left Ear: External ear normal.      Nose: Nose normal.      Mouth/Throat:      Pharynx: Oropharynx is clear. Eyes:      Extraocular Movements: Extraocular movements intact. Conjunctiva/sclera: Conjunctivae normal.   Cardiovascular:      Rate and Rhythm: Normal rate and regular rhythm. Pulses: Normal pulses. Heart sounds: Normal heart sounds. Pulmonary:      Effort: Pulmonary effort is normal.      Breath sounds: Normal breath sounds. Abdominal:      Palpations: Abdomen is soft. Musculoskeletal:      Cervical back: Neck supple. Right lower leg: No edema. Left lower leg: No edema. Skin:     General: Skin is warm.    Neurological:      Mental Status: She is alert. Mental status is at baseline. Comments: Wheelchair bound  R hand contracture, able to open hand to almost full extension   Psychiatric:         Mood and Affect: Mood normal.         Revised Cardiac Risk Index (RCRI) for Pre-Operative Risk   (estimates risk of cardiac complications after noncardiac surgery)    · High-risk surgery: No 0   · Intraperitoneal, intrathoracic, suprainguinal vascular  · History of ischemic heart disease: No 0   · Hx of MI, (+) exercise test, current chest pain considered due to myocardial ischemia, use of nitrate therapy or ECG with pathological Q waves)  · History of CHF: No 0   · Pulmonary edema, B/L rales or S3 gallop; PAEZ, orthopnea, PND, CXR showing pulmonary vascular redistribution)  · History of cerebrovascular disease: No 0   · Prior TIA or stroke  · Pre-operative treatment with insulin: No 0   · Pre-operative creatinine >2 mg/dL: No 0    RCRI Scoring:  · 0 points: Class I Risk, 3.9% Risk of Major Cardiac Event  · 1 point: Class II Risk, 6.0% Risk of Major Cardiac Event  · 2 points: Class III Risk, 10.1% Risk of Major Cardiac Event  · 3 points: Class IV Risk, 15% Risk of Major Cardiac Event  · 4 points: Class IV Risk, 15% Risk of Major Cardiac Event  · 5 points: Class IV Risk, 15% Risk of Major Cardiac Event  · 6 points: Class IV Risk, 15% Risk of Major Cardiac Event    Lab Results   Component Value Date    CREATININE 0.91 07/25/2023       [facility] was seen today for pre-op exam and vaginal spotting. Diagnoses and all orders for this visit:    Preop examination    Contracture of right hand    Postmenopausal vaginal bleeding  -     Cancel: US abdomen and pelvis with transvaginal; Future  -     US pelvis complete w transvaginal; Future        Recommendations:  Asad Lind is undergoing an elective Minimal risk for MRI C-spine with sedation. He is RCRI class I risk (0 points) with 3.9% risk for major adverse cardiac event (MACE).  Patient is medically optimized and she may proceed with procedure as planned without further workup. Discussed with Dr. Carol Watts.

## 2023-08-08 NOTE — PROGRESS NOTES
Name: Tha Smith      : 1960      MRN: [id number]  Encounter Provider: Marietta Myles DO  Encounter Date: 2023   Encounter department: [address]     1. Preop examination  Assessment & Plan:  For MRI C-spine with sedation in setting of new right hand contracture. Followed by neuro. RCRI 0, 3.9% risk of MACE. Patient medically optimized for procedure. 2. Contracture of right hand  Assessment & Plan:  Ongoing concern with some improvement in symptoms. Neuro planning for MRI of C-spine under sedation on . Pre-op clearance today. 3. Postmenopausal vaginal bleeding  Assessment & Plan:  First developed about 5 days ago, noticed with wiping with about 3 other instances. No further episodes since 2 days ago. - will check TVUS, ordered today  - last papa  WNL   - will need to follow up GYN exam    Orders:  -     US pelvis complete w transvaginal; Future; Expected date: 2023         Subjective      59-year-old female presents with her caretaker, John Navarro, for preop clearance for MRI with sedation. Incidentally mentioning the patient has also had brief episodes of vaginal bleeding over this past weekend. Initially noticed with wiping. No further episodes since the weekend. Last Pap in  was within normal limits. Requesting TVUS ordered today as able to take some time to schedule.       Review of Systems   Unable to perform ROS: Patient nonverbal       Current Outpatient Medications on File Prior to Visit   Medication Sig   • ARIPiprazole (ABILIFY) 2 mg tablet Take 1 tablet by mouth daily at bedtime   • baclofen 10 mg tablet Take 1 tablet (10 mg total) by mouth 3 (three) times a day   • benzonatate (TESSALON PERLES) 100 mg capsule TAKE 1 CAPSULE BY MOUTH EVERY 8 HOURS AS NEEDED FOR COUGH **DO NOT CRUSH**   • Botox 100 units INJECT UP  UNITS IN THE MUSCLE INTO LOWER AND UPPER EXTREMITIES ONCE EVERY 3 MONTHS • calcium carbonate (OS-MORRIS) 600 MG tablet Take 1 tablet (600 mg total) by mouth 2 (two) times a day with meals   • carbamide peroxide (DEBROX) 6.5 % otic solution 2 drops 2 drops in the affected ear prn x 5 days   • carbidopa-levodopa (SINEMET CR)  mg TBCR per ER tablet Take 2 tablets by mouth 2 (two) times a day Take morning dose 7 Am and afternoon dose at 4 PM   • carboxymethylcellulose (REFRESH PLUS) 0.5 % SOLN 1 drop 3 (three) times a day as needed for dry eyes   • cholecalciferol (VITAMIN D3) 1,000 units tablet Take 1 tablet (1,000 Units total) by mouth daily   • citalopram (CeleXA) 40 mg tablet Take 1 tablet by mouth daily   • DIABETIC TUSSIN  MG/5ML oral liquid TAKE 2 TEASPOONSFUL (10ML) BY MOUTH EVERY 4 HOURS AS NEEDED FOR COUGH & CONGESTION   • divalproex sodium (DEPAKOTE SPRINKLE) 125 MG capsule 4 capsules 2 (two) times a day 4 caps BID   • docosanol (ABREVA) 10 % Apply topically 5 (five) times a day Apply and rub in 5x a day until healed as needed for lesion   • ferrous sulfate 324 (65 Fe) mg Take 1 tablet by mouth 2 (two) times a day   • fexofenadine (ALLEGRA) 30 MG/5ML suspension Take 30 mL (180 mg total) by mouth daily as needed (seasonal allergies)   • fluticasone (FLONASE) 50 mcg/act nasal spray 1 spray into each nostril daily as needed for rhinitis or allergies   • glimepiride (AMARYL) 1 mg tablet Take 1 tablet (1 mg total) by mouth daily with breakfast   • hydrocortisone 1 % cream Apply topically to affected area twice daily as needed for rash   • levothyroxine (SYNTHROID) 25 mcg tablet Take 1 tablet (25 mcg) by mouth every morning   • LORazepam (ATIVAN) 0.5 mg tablet Take 0.5 mg by mouth daily Every day at 8pm   • metFORMIN (GLUCOPHAGE) 1000 MG tablet Take 1 tablet (1,000 mg total) by mouth 2 (two) times a day with meals   • neomycin-bacitracin-polymyxin b (NEOSPORIN) ointment Apply 1 application topically 2 (two) times a day as needed Apply to affected area BID PRN   • nitrofurantoin (MACRODANTIN) 50 mg capsule Take 1 capsule by mouth daily at bedtime   • olopatadine HCl (PATADAY) 0.2 % opth drops Administer 1 drop to both eyes as needed Instill 1 drop into affected eyes daily PRN for eye redness   • omeprazole (PriLOSEC) 20 mg delayed release capsule Take 1 capsule (20 mg total) by mouth 2 (two) times a day before meals   • Oxybutynin Chloride (DITROPAN XL PO) Take 5 mg by mouth 3 (three) times a day 1 tablet TID   • polyethylene glycol (GLYCOLAX) 17 GM/SCOOP powder DISSOLVE 17GM (1 CAPFUL) IN 8 OZ FLUIDS AND CONSUME BY MOUTH EVERY MORNING *HOLD 1 DAY FOR LOOSE STOOLS* (CONSTIPATION)   • simvastatin (ZOCOR) 20 mg tablet Take 1 tablet (20 mg total) by mouth daily with dinner at 4 pm for hyperlipidemia   • Starch, Thickening, POWD Take by mouth daily Use on food and liquid as directed   • traZODone (DESYREL) 100 mg tablet Take 1 tablet (100 mg total) by mouth daily at  8 pm for depression   • Vitamins A & D (VITAMIN A & D) ointment Apply topically as needed for skin irritation       Objective     BP 99/66 (BP Location: Right arm, Patient Position: Sitting, Cuff Size: Child)   Pulse 82   Temp 98.2 °F (36.8 °C) (Temporal)   LMP 11/29/2009 (Exact Date)   SpO2 96%     Physical Exam  Vitals reviewed. Constitutional:       General: She is not in acute distress. Appearance: Normal appearance. HENT:      Head: Normocephalic and atraumatic. Right Ear: External ear normal.      Left Ear: External ear normal.      Nose: Nose normal.      Mouth/Throat:      Pharynx: Oropharynx is clear. Eyes:      Conjunctiva/sclera: Conjunctivae normal.   Cardiovascular:      Rate and Rhythm: Normal rate and regular rhythm. Pulses: Normal pulses. Heart sounds: Normal heart sounds. Pulmonary:      Effort: Pulmonary effort is normal.      Breath sounds: Normal breath sounds. Abdominal:      Palpations: Abdomen is soft. Musculoskeletal:      Cervical back: Neck supple. Right lower leg: No edema. Left lower leg: No edema. Skin:     General: Skin is warm. Capillary Refill: Capillary refill takes less than 2 seconds. Neurological:      Mental Status: She is alert. Mental status is at baseline. Comments: Wheelchair bound  R hand with some contracture but able to open to almost full extension.     Psychiatric:         Mood and Affect: Mood normal.       Inderjit Cruz, DO

## 2023-08-08 NOTE — ASSESSMENT & PLAN NOTE
For MRI C-spine with sedation in setting of new right hand contracture. Followed by neuro. RCRI 0, 3.9% risk of MACE. Patient medically optimized for procedure.

## 2023-08-08 NOTE — ASSESSMENT & PLAN NOTE
Ongoing concern with some improvement in symptoms. Neuro planning for MRI of C-spine under sedation on 8/25. Pre-op clearance today.

## 2023-08-11 ENCOUNTER — HOSPITAL ENCOUNTER (OUTPATIENT)
Dept: ULTRASOUND IMAGING | Facility: HOSPITAL | Age: 63
Discharge: HOME | End: 2023-08-11
Payer: MEDICARE

## 2023-08-11 ENCOUNTER — ANESTHESIA EVENT (OUTPATIENT)
Dept: MRI IMAGING | Facility: HOSPITAL | Age: 63
End: 2023-08-11

## 2023-08-11 DIAGNOSIS — N95.0 POSTMENOPAUSAL VAGINAL BLEEDING: ICD-10-CM

## 2023-08-11 PROCEDURE — 76830 TRANSVAGINAL US NON-OB: CPT

## 2023-08-11 PROCEDURE — 76856 US EXAM PELVIC COMPLETE: CPT

## 2023-08-15 ENCOUNTER — TELEPHONE (OUTPATIENT)
Dept: FAMILY MEDICINE CLINIC | Facility: CLINIC | Age: 63
End: 2023-08-15

## 2023-08-15 ENCOUNTER — APPOINTMENT (OUTPATIENT)
Dept: LAB | Age: 63
End: 2023-08-15
Payer: MEDICARE

## 2023-08-15 ENCOUNTER — OFFICE VISIT (OUTPATIENT)
Dept: FAMILY MEDICINE CLINIC | Facility: CLINIC | Age: 63
End: 2023-08-15

## 2023-08-15 VITALS
SYSTOLIC BLOOD PRESSURE: 100 MMHG | TEMPERATURE: 97.8 F | RESPIRATION RATE: 20 BRPM | HEART RATE: 73 BPM | DIASTOLIC BLOOD PRESSURE: 68 MMHG

## 2023-08-15 DIAGNOSIS — N95.0 POSTMENOPAUSAL VAGINAL BLEEDING: Primary | ICD-10-CM

## 2023-08-15 DIAGNOSIS — N95.0 POSTMENOPAUSAL VAGINAL BLEEDING: ICD-10-CM

## 2023-08-15 PROCEDURE — 99213 OFFICE O/P EST LOW 20 MIN: CPT | Performed by: FAMILY MEDICINE

## 2023-08-15 PROCEDURE — 87591 N.GONORRHOEAE DNA AMP PROB: CPT

## 2023-08-15 PROCEDURE — 87491 CHLMYD TRACH DNA AMP PROBE: CPT

## 2023-08-15 NOTE — TELEPHONE ENCOUNTER
US reviewed, has endometrial thickening up to 9mm. Will send referral to GYN, results discussed with patient's caregiver Zander. Expressing understanding and agreed with plan.

## 2023-08-15 NOTE — ASSESSMENT & PLAN NOTE
Previously seen for suspected vaginal bleeding between 8/4-8/5. No bleeding since  Speculum exam performed today - cervix appears inflamed, friable with physiological discharge  Already had recent Pap on 2021 which was normal, so no swabs taken  · Will get UA and GC/Chlamydia for completeness, but suspicion is very low for UTI, STD   · TVUS results returned after visit - endometrial thickening of 9mm noted  · Called patient's caregiver with results, will send referral for GYN.  Will ultimately need endometrial biopsy

## 2023-08-15 NOTE — PROGRESS NOTES
Name: Johnathan Araya      : 1960      MRN: [id number]  Encounter Provider: Day Avitia MD  Encounter Date: 8/15/2023   Encounter department: [address]     1. Postmenopausal vaginal bleeding  Assessment & Plan:  Previously seen for suspected vaginal bleeding between -. No bleeding since  Speculum exam performed today - cervix appears inflamed, friable with physiological discharge  Already had recent Pap on  which was normal, so no swabs taken  · Will get UA and GC/Chlamydia for completeness, but suspicion is very low for UTI, STD   · TVUS pending, called radiology reading room and US on priority list. Depending on TVUS, may need endometrial biopsy for which we will refer to GYN    Orders:  -     UA w Reflex to Microscopic w Reflex to Culture -Lab Collect; Future; Expected date: 08/15/2023  -     Chlamydia/GC amplified DNA by PCR; Future         Subjective      66 YOF seen with caregivers today for follow-up of vaginal bleeding. Had vaginal bleeding between - multiple times. Vaginal bleeding first seen in pull-up and on wiping on , but no signs of discomfort/frequency concerning for UTI. On , caregivers also noted blood on the floor and shower chair while staff were helping the patient in the shower. In addition, they also noted a string of clot on wiping that same day. The only change the caregiver noted was that the patient had an especially formed bowel movement on , when usually the BMs are looser. Otherwise, the patient has been asymptomatic and there has been no additional bleeding since. TVUS was completed but final read still pending. Less likely STD, especially since there are no males near the patient.  Last Pap back in  normal.     Review of Systems   Unable to perform ROS: Patient nonverbal         Current Outpatient Medications on File Prior to Visit   Medication Sig   • ARIPiprazole (ABILIFY) 2 mg tablet Take 1 tablet by mouth daily at bedtime   • baclofen 10 mg tablet Take 1 tablet (10 mg total) by mouth 3 (three) times a day   • benzonatate (TESSALON PERLES) 100 mg capsule TAKE 1 CAPSULE BY MOUTH EVERY 8 HOURS AS NEEDED FOR COUGH **DO NOT CRUSH**   • Botox 100 units INJECT UP  UNITS IN THE MUSCLE INTO LOWER AND UPPER EXTREMITIES ONCE EVERY 3 MONTHS   • calcium carbonate (OS-MORRIS) 600 MG tablet Take 1 tablet (600 mg total) by mouth 2 (two) times a day with meals   • carbamide peroxide (DEBROX) 6.5 % otic solution 2 drops 2 drops in the affected ear prn x 5 days   • carbidopa-levodopa (SINEMET CR)  mg TBCR per ER tablet Take 2 tablets by mouth 2 (two) times a day Take morning dose 7 Am and afternoon dose at 4 PM   • carboxymethylcellulose (REFRESH PLUS) 0.5 % SOLN 1 drop 3 (three) times a day as needed for dry eyes   • cholecalciferol (VITAMIN D3) 1,000 units tablet Take 1 tablet (1,000 Units total) by mouth daily   • citalopram (CeleXA) 40 mg tablet Take 1 tablet by mouth daily   • DIABETIC TUSSIN  MG/5ML oral liquid TAKE 2 TEASPOONSFUL (10ML) BY MOUTH EVERY 4 HOURS AS NEEDED FOR COUGH & CONGESTION   • divalproex sodium (DEPAKOTE SPRINKLE) 125 MG capsule 4 capsules 2 (two) times a day 4 caps BID   • docosanol (ABREVA) 10 % Apply topically 5 (five) times a day Apply and rub in 5x a day until healed as needed for lesion   • ferrous sulfate 324 (65 Fe) mg Take 1 tablet by mouth 2 (two) times a day   • fexofenadine (ALLEGRA) 30 MG/5ML suspension Take 30 mL (180 mg total) by mouth daily as needed (seasonal allergies)   • fluticasone (FLONASE) 50 mcg/act nasal spray 1 spray into each nostril daily as needed for rhinitis or allergies   • glimepiride (AMARYL) 1 mg tablet Take 1 tablet (1 mg total) by mouth daily with breakfast   • hydrocortisone 1 % cream Apply topically to affected area twice daily as needed for rash   • levothyroxine (SYNTHROID) 25 mcg tablet Take 1 tablet (25 mcg) by mouth every morning • LORazepam (ATIVAN) 0.5 mg tablet Take 0.5 mg by mouth daily Every day at 8pm   • metFORMIN (GLUCOPHAGE) 1000 MG tablet Take 1 tablet (1,000 mg total) by mouth 2 (two) times a day with meals   • neomycin-bacitracin-polymyxin b (NEOSPORIN) ointment Apply 1 application topically 2 (two) times a day as needed Apply to affected area BID PRN   • nitrofurantoin (MACRODANTIN) 50 mg capsule Take 1 capsule by mouth daily at bedtime   • olopatadine HCl (PATADAY) 0.2 % opth drops Administer 1 drop to both eyes as needed Instill 1 drop into affected eyes daily PRN for eye redness   • omeprazole (PriLOSEC) 20 mg delayed release capsule Take 1 capsule (20 mg total) by mouth 2 (two) times a day before meals   • Oxybutynin Chloride (DITROPAN XL PO) Take 5 mg by mouth 3 (three) times a day 1 tablet TID   • polyethylene glycol (GLYCOLAX) 17 GM/SCOOP powder DISSOLVE 17GM (1 CAPFUL) IN 8 OZ FLUIDS AND CONSUME BY MOUTH EVERY MORNING *HOLD 1 DAY FOR LOOSE STOOLS* (CONSTIPATION)   • simvastatin (ZOCOR) 20 mg tablet Take 1 tablet (20 mg total) by mouth daily with dinner at 4 pm for hyperlipidemia   • Starch, Thickening, POWD Take by mouth daily Use on food and liquid as directed   • traZODone (DESYREL) 100 mg tablet Take 1 tablet (100 mg total) by mouth daily at  8 pm for depression   • Vitamins A & D (VITAMIN A & D) ointment Apply topically as needed for skin irritation       Objective     /68   Pulse 73   Temp 97.8 °F (36.6 °C)   Resp 20   LMP 11/29/2009 (Exact Date)     Physical Exam  Vitals reviewed. Exam conducted with a chaperone present. Constitutional:       General: She is not in acute distress. Appearance: Normal appearance. She is not ill-appearing. HENT:      Head: Normocephalic and atraumatic. Nose: Nose normal.   Eyes:      Extraocular Movements: Extraocular movements intact. Conjunctiva/sclera: Conjunctivae normal.   Genitourinary:     General: Normal vulva.       Exam position: Lithotomy position. Pubic Area: No rash. Vagina: Normal.      Cervix: Friability and erythema present. No cervical bleeding. Discharge: physiologic discharge. Rectum: Normal. No anal fissure or external hemorrhoid. Musculoskeletal:      Cervical back: Normal range of motion. Neurological:      Mental Status: She is alert. Mental status is at baseline.    Psychiatric:         Mood and Affect: Mood normal.       Ciara Faust MD

## 2023-08-16 LAB
C TRACH DNA SPEC QL NAA+PROBE: NEGATIVE
N GONORRHOEA DNA SPEC QL NAA+PROBE: NEGATIVE

## 2023-08-18 ENCOUNTER — TELEPHONE (OUTPATIENT)
Dept: FAMILY MEDICINE CLINIC | Facility: CLINIC | Age: 63
End: 2023-08-18

## 2023-08-18 DIAGNOSIS — R31.9 URINARY TRACT INFECTION WITH HEMATURIA, SITE UNSPECIFIED: Primary | ICD-10-CM

## 2023-08-18 DIAGNOSIS — N39.0 URINARY TRACT INFECTION WITH HEMATURIA, SITE UNSPECIFIED: Primary | ICD-10-CM

## 2023-08-18 RX ORDER — SULFAMETHOXAZOLE AND TRIMETHOPRIM 800; 160 MG/1; MG/1
1 TABLET ORAL 2 TIMES DAILY
Qty: 6 TABLET | Refills: 0 | Status: SHIPPED | OUTPATIENT
Start: 2023-08-18 | End: 2023-08-21

## 2023-08-18 NOTE — TELEPHONE ENCOUNTER
Called patient's caregiver Darryn Mirzaing in regards to Urine culture results and sensitivities. Patient is positive for > 100,000 colonies of E.coli, susceptible to most antibiotics except for Keflex, which patient is intolerant to anyway. Will start Bactrim BID for 3 days. Pt already has an appt established with GYN on 8/29 for endometrial thickening and postmenopausal bleeding.

## 2023-08-25 ENCOUNTER — HOSPITAL ENCOUNTER (OUTPATIENT)
Dept: MRI IMAGING | Facility: HOSPITAL | Age: 63
End: 2023-08-25
Payer: MEDICARE

## 2023-08-25 ENCOUNTER — ANESTHESIA (OUTPATIENT)
Dept: MRI IMAGING | Facility: HOSPITAL | Age: 63
End: 2023-08-25

## 2023-08-25 VITALS
HEART RATE: 57 BPM | HEIGHT: 58 IN | RESPIRATION RATE: 12 BRPM | DIASTOLIC BLOOD PRESSURE: 59 MMHG | WEIGHT: 106 LBS | OXYGEN SATURATION: 95 % | SYSTOLIC BLOOD PRESSURE: 106 MMHG | BODY MASS INDEX: 22.25 KG/M2 | TEMPERATURE: 97.9 F

## 2023-08-25 DIAGNOSIS — M21.339 WRIST DROP, UNSPECIFIED LATERALITY: ICD-10-CM

## 2023-08-25 DIAGNOSIS — G24.9 DYSTONIA: ICD-10-CM

## 2023-08-25 DIAGNOSIS — G80.3 DYSTONIC CEREBRAL PALSY (HCC): ICD-10-CM

## 2023-08-25 PROCEDURE — 72141 MRI NECK SPINE W/O DYE: CPT

## 2023-08-25 PROCEDURE — G1004 CDSM NDSC: HCPCS

## 2023-08-25 RX ORDER — SODIUM CHLORIDE 9 MG/ML
INJECTION, SOLUTION INTRAVENOUS CONTINUOUS PRN
Status: DISCONTINUED | OUTPATIENT
Start: 2023-08-25 | End: 2023-08-25

## 2023-08-25 RX ORDER — PROPOFOL 10 MG/ML
INJECTION, EMULSION INTRAVENOUS AS NEEDED
Status: DISCONTINUED | OUTPATIENT
Start: 2023-08-25 | End: 2023-08-25

## 2023-08-25 RX ORDER — MIDAZOLAM HYDROCHLORIDE 2 MG/2ML
INJECTION, SOLUTION INTRAMUSCULAR; INTRAVENOUS AS NEEDED
Status: DISCONTINUED | OUTPATIENT
Start: 2023-08-25 | End: 2023-08-25

## 2023-08-25 RX ORDER — LIDOCAINE HYDROCHLORIDE 10 MG/ML
INJECTION, SOLUTION EPIDURAL; INFILTRATION; INTRACAUDAL; PERINEURAL AS NEEDED
Status: DISCONTINUED | OUTPATIENT
Start: 2023-08-25 | End: 2023-08-25

## 2023-08-25 RX ORDER — PROPOFOL 10 MG/ML
INJECTION, EMULSION INTRAVENOUS CONTINUOUS PRN
Status: DISCONTINUED | OUTPATIENT
Start: 2023-08-25 | End: 2023-08-25

## 2023-08-25 RX ADMIN — PROPOFOL 20 MG: 10 INJECTION, EMULSION INTRAVENOUS at 08:37

## 2023-08-25 RX ADMIN — MIDAZOLAM 2 MG: 1 INJECTION INTRAMUSCULAR; INTRAVENOUS at 08:34

## 2023-08-25 RX ADMIN — LIDOCAINE HYDROCHLORIDE 50 MG: 10 INJECTION, SOLUTION EPIDURAL; INFILTRATION; INTRACAUDAL; PERINEURAL at 08:37

## 2023-08-25 RX ADMIN — SODIUM CHLORIDE: 9 INJECTION, SOLUTION INTRAVENOUS at 07:33

## 2023-08-25 RX ADMIN — PROPOFOL 40 MCG/KG/MIN: 10 INJECTION, EMULSION INTRAVENOUS at 08:37

## 2023-08-25 NOTE — ANESTHESIA PREPROCEDURE EVALUATION
Procedure:  MRI CERVICAL SPINE WO CONTRAST    Relevant Problems   CARDIO   (+) Hyperlipidemia      ENDO   (+) Hypothyroidism   (+) Type 2 diabetes mellitus, without long-term current use of insulin (HCC)      GI/HEPATIC   (+) Dysphagia   (+) Gastroesophageal reflux disease   (+) 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HCC)      HEMATOLOGY   (+) Iron deficiency anemia   (+) Macrocytic anemia      MUSCULOSKELETAL   (+) Osteoarthritis of both hips      NEURO/PSYCH   (+) Anxiety   (+) Other chronic pain      Nervous and Auditory   (+) Cervical dystonia   (+) Dystonic cerebral palsy (HCC)      Other   (+) Bipolar disorder (HCC)        Physical Exam    Airway    Mallampati score: unable to assess         Dental       Cardiovascular      Pulmonary      Other Findings        Anesthesia Plan  ASA Score- 3     Anesthesia Type- IV sedation with anesthesia with ASA Monitors. Additional Monitors:   Airway Plan:     Comment: Spoke with care giver and POA. Discussed the possibility that the sedation may not inhibit pt from moving and that images may not be idea. We will do our best to deliver safe anesthesia and good images. They both understand and agree. .       Plan Factors-    Chart reviewed. Patient summary reviewed. Induction- intravenous. Postoperative Plan-     Informed Consent- Anesthetic plan and risks discussed with patient and healthcare power of . I personally reviewed this patient with the CRNA. Discussed and agreed on the Anesthesia Plan with the CRNA. Tina Mcintosh

## 2023-08-25 NOTE — ANESTHESIA POSTPROCEDURE EVALUATION
Post-Op Assessment Note    CV Status:  Stable  Pain Score: 0    Pain management: adequate     Mental Status:  Alert and awake   Hydration Status:  Euvolemic   PONV Controlled:  Controlled   Airway Patency:  Patent      Post Op Vitals Reviewed: Yes      Staff: CRNA         No notable events documented.     BP   101/59   Temp  97.9   Pulse  55   Resp   14   SpO2   95%
family member/on unit

## 2023-08-29 ENCOUNTER — OFFICE VISIT (OUTPATIENT)
Dept: OBGYN CLINIC | Facility: CLINIC | Age: 63
End: 2023-08-29

## 2023-08-29 VITALS
HEIGHT: 58 IN | HEART RATE: 73 BPM | DIASTOLIC BLOOD PRESSURE: 55 MMHG | WEIGHT: 106 LBS | SYSTOLIC BLOOD PRESSURE: 91 MMHG | BODY MASS INDEX: 22.25 KG/M2

## 2023-08-29 DIAGNOSIS — N95.0 POSTMENOPAUSAL VAGINAL BLEEDING: Primary | ICD-10-CM

## 2023-08-29 PROCEDURE — 99204 OFFICE O/P NEW MOD 45 MIN: CPT | Performed by: OBSTETRICS & GYNECOLOGY

## 2023-08-29 PROCEDURE — 88305 TISSUE EXAM BY PATHOLOGIST: CPT | Performed by: STUDENT IN AN ORGANIZED HEALTH CARE EDUCATION/TRAINING PROGRAM

## 2023-08-29 PROCEDURE — 58100 BIOPSY OF UTERUS LINING: CPT | Performed by: OBSTETRICS & GYNECOLOGY

## 2023-08-29 NOTE — PROGRESS NOTES
[facility]   Postmenopausal bleeding  Name: Lizzette Monroe  MRN: [id number]  : 1960      ASSESSMENT/PLAN:  Problem   Postmenopausal Vaginal Bleeding    One time occurrence on - without further bleeding since  TVUS completed  with EMS 9mm  Endometrial biopsy obtained today, consent obtained from guardian with nursing staff present  Plan to follow-up results       US pelvis complete w transvaginal 23  IMPRESSION:  Abnormal lower uterine segment endometrial thickening measuring up to 9 mm AP. Neither ovary is visualized. SUBJECTIVE:  Debbie Wallis is a 65yo G0 with history of cerebral palsy, parkinsonism, and developmental delay who presents for consultation for postmenopausal bleeding and thickened endometrial strip at 9mm as demonstrated on TVUS iamging completed , impression as above. She is accompanied by her home nurse. Patient was most recently seen with Family Medicine on 8/15 for follow-up for one episode of postmenopausal bleeding on - without further occurrence. Patient's caretakers had noted a small blood clot emanating from her vulvar area and on her underwear at that time. LMP 2009 while on Depo-Provera that was stopped over 10 years ago. Pap on 2021 NILM and HPV negative. Today, we discussed that even with one occurrence of vaginal bleeding given thickened endometrial lining it is appropriate to proceed for endometrial biopsy to assess for underlying hyperplasia or malignancy. Consent was obtained with patient, nurse, and verbal consent from her guardian Aurora Collins. We discussed follow-up with results by telephone and return for annual examination otherwise. OBJECTIVE:  Vitals:    23 1412   BP: 91/55   Pulse: 73       Physical Exam  Vitals and nursing note reviewed. Exam conducted with a chaperone present. Constitutional:       General: She is not in acute distress. HENT:      Head: Normocephalic.       Right Ear: External ear normal.      Left Ear: External ear normal.   Eyes:      General: No scleral icterus. Right eye: No discharge. Left eye: No discharge. Conjunctiva/sclera: Conjunctivae normal.   Cardiovascular:      Rate and Rhythm: Normal rate and regular rhythm. Pulses: Normal pulses. Heart sounds: Normal heart sounds. Pulmonary:      Effort: Pulmonary effort is normal. No respiratory distress. Breath sounds: Normal breath sounds. Abdominal:      General: Abdomen is flat. There is no distension. Palpations: Abdomen is soft. Tenderness: There is no abdominal tenderness. There is no guarding. Genitourinary:     General: Normal vulva. Exam position: Lithotomy position. Danny stage (genital): 5. Labia:         Right: No rash, tenderness, lesion or injury. Left: No rash, tenderness, lesion or injury. Vagina: No vaginal discharge, erythema, tenderness, bleeding or lesions. Cervix: No discharge, lesion or cervical bleeding. Uterus: Not enlarged and not tender. Musculoskeletal:         General: No swelling or tenderness. Normal range of motion. Cervical back: Normal range of motion. Right lower leg: No edema. Left lower leg: No edema. Skin:     General: Skin is warm and dry. Capillary Refill: Capillary refill takes less than 2 seconds. Neurological:      Mental Status: She is alert and oriented to person, place, and time. Mental status is at baseline. Psychiatric:         Mood and Affect: Mood normal.         Behavior: Behavior normal.         Endometrial biopsy    Date/Time: 8/29/2023 3:23 PM    Performed by: Polly Norris MD  Authorized by: Polly Norris MD  [place] Protocol:  Procedure performed by: (Dr. Liseth Fletcher)  Consent: Verbal consent obtained.   Risks and benefits: risks, benefits and alternatives were discussed  Consent given by: guardian  Time out: Immediately prior to procedure a "time out" was called to verify the correct patient, procedure, equipment, support staff and site/side marked as required. Procedure consent: procedure consent matches procedure scheduled  Relevant documents: relevant documents present and verified  Test results: test results available and properly labeled  Radiology Images displayed and confirmed.  If images not available, report reviewed: imaging studies available  Required items: required blood products, implants, devices, and special equipment available  Patient identity confirmed: provided demographic data      Indication:     Indications: Post-menopausal bleeding      Chronicity of post-menopausal bleeding:  New    Progression of post-menopausal bleeding:  Resolved  Procedure:     Procedure: endometrial biopsy with Pip[place]      A bivalve speculum was placed in the vagina: yes      Cervix cleaned and prepped: yes      Uterus sounded: yes      Uterus sound depth (cm):  4.5    Specimen collected: specimen collected and sent to pathology      Patient tolerated procedure well with no complications: yes    Findings:     Uterus size:  Non-gravid    Cervix: normal            Rosio Lai MD  OB/GYN PGY-3  8/29/2023  3:22 PM

## 2023-09-01 ENCOUNTER — OFFICE VISIT (OUTPATIENT)
Dept: NEUROLOGY | Facility: CLINIC | Age: 63
End: 2023-09-01

## 2023-09-01 VITALS
HEIGHT: 58 IN | HEART RATE: 76 BPM | OXYGEN SATURATION: 96 % | WEIGHT: 106 LBS | TEMPERATURE: 94.4 F | SYSTOLIC BLOOD PRESSURE: 100 MMHG | BODY MASS INDEX: 22.25 KG/M2 | DIASTOLIC BLOOD PRESSURE: 64 MMHG

## 2023-09-01 DIAGNOSIS — I69.398 SPASTICITY AS LATE EFFECT OF CEREBROVASCULAR ACCIDENT (CVA): ICD-10-CM

## 2023-09-01 DIAGNOSIS — G20 PARKINSONISM, UNSPECIFIED PARKINSONISM TYPE (HCC): Primary | ICD-10-CM

## 2023-09-01 DIAGNOSIS — G80.3 DYSTONIC CEREBRAL PALSY (HCC): ICD-10-CM

## 2023-09-01 DIAGNOSIS — R25.2 SPASTICITY AS LATE EFFECT OF CEREBROVASCULAR ACCIDENT (CVA): ICD-10-CM

## 2023-09-01 PROCEDURE — 88305 TISSUE EXAM BY PATHOLOGIST: CPT | Performed by: STUDENT IN AN ORGANIZED HEALTH CARE EDUCATION/TRAINING PROGRAM

## 2023-09-01 RX ORDER — OXYBUTYNIN CHLORIDE 5 MG/1
TABLET ORAL
COMMUNITY
Start: 2023-08-30

## 2023-09-01 RX ORDER — BACLOFEN 10 MG/1
10 TABLET ORAL 3 TIMES DAILY
Qty: 90 TABLET | Refills: 11 | Status: SHIPPED | OUTPATIENT
Start: 2023-09-01

## 2023-09-01 RX ORDER — CARBOXYMETHYLCELLULOSE SODIUM 10 MG/ML
GEL OPHTHALMIC
COMMUNITY
Start: 2023-08-13

## 2023-09-01 NOTE — PROGRESS NOTES
Patient ID: Stephany Cushing is a 66 y.o. female. Assessment/Plan:    Parkinsonism ([address])  Stable on medication, she has had improvement of tremor with reduction of abilify and initiation of sinemet. She will continue her current dose of  Sinemet CR  mg 12 tabs BID. Dystonic cerebral palsy (HCC)  Patient's dystonic posturing appears improved since last visit. She has obtained new wheel chair with bolster and straps which has improved her posture, she also appears less fatigued and more alert as well. Last visit she was seen for sudden onset of wrist flexion/contracture and then weakness of the wrist. Since then she has had improvement of her wrist strength, ROM, and coordination of the right hand. She did have MRI brain completed with no acute findings, Mri cspine completed recently that did show degenerative changes along with spinal and foraminal stenosis to varying degrees. It is likely that her dystonia caused extreme flexion of the wrist which caused injury to the wrist and therefore she had weakness related to orthopedic injury/pain given she had rapid recovery of symptoms. I would suspect other weakness in the UE if this was caused by cervical radiculopathy. Given her improvement would have her cancel EMG as this would not  at this time. In regards to her spasticity and dystonia she will remain on her current dose of baclofen and continue follow up with PM&R for botox injections. As previously stated staff have noted improvement in her posturing with new wheelchair and last visit with PM&R they held off on botox injections. She will also continue the use of wrist/hand splints to assist with hand/wrist posturing/contracture. Plan for follow up in 6 months. To contact the office sooner with any concerns or worsening symptoms.        Diagnoses and all orders for this visit:    Parkinsonism, unspecified Parkinsonism type ([address])    Spasticity as late effect of cerebrovascular accident (CVA)  -     baclofen 10 mg tablet; Take 1 tablet (10 mg total) by mouth 3 (three) times a day    Dystonic cerebral palsy (HCC)    Other orders  -     Refresh Liquigel 1 % GEL  -     oxybutynin (DITROPAN) 5 mg tablet           Subjective:    SHAYNA Palma is a [address] y.o. female with a PMH of intellectual disability 2/2 cerebral palsy, diabetes, and dysphagia presenting for follow up. To review, she has followed with our epilepsy team since 2016 for history of seizure like episodes. When she was seen in 10/2021  her largest concern was increased tremors, and gait changes, possible "freezing"Her exam did show significant spasticity. Symptoms worsening over the past few months. She was started on a trial of sinemet due to worsening parkinsonain features, she was referred to PM&R due to spasticity. Did see PM&R for inital consult 4/2022 in which there was a question of dystonia vs spasticity, plan was for possible botox injections. last office visit 5/2023 in which she had an episode and when she was unable to move her wrist or open her fingers further work-up was ordered, ?if likely due to dystonia  She was to follow-up with PM&R.  she continues to follow with PM&R for her dystonia/spasticity-receiving Botox injections, is also maintained on baclofen. Interval history:  Her hand symptoms are better, staff has been hand exercises at home, she has a brace that she wears on the right hand now as needed. Swelling has improved as well. She is back to using her hand with previous dexterity. She now has a new wheelchair,they have found the straps and bolster has helped with foot and leg movements. She seems more comfortable. Her tremors are stable. She remains on abilify 1 mg daily. She will stand and pivot for transfers and care.       EMG?           MRI cspine 8/2023: Multilevel cervical degenerative disc disease as detailed with diffuse disc osteophyte complexes at the C4-5 through the C6-7 levels. Superimposed central disc protrusion at the C4-5 level causing moderate central canal narrowing and focal cord   impingement. There is no cord signal abnormality to suggest myelomalacia or edema. Moderate to severe right C4-5, right C5-6, and left C6-7 neural foraminal narrowing. MRI brain 5/2023: Study severely degraded by patient motion artifact with limited pulse sequences obtained. No mass effect or midline shift and infarct identified. Diffuse cerebral volume loss. Objective:    Blood pressure 100/64, pulse 76, temperature (!) 94.4 °F (34.7 °C), temperature source Temporal, height 4' 10" (1.473 m), weight 48.1 kg (106 lb), last menstrual period 11/29/2009, SpO2 96 %, not currently breastfeeding. Physical Exam  Constitutional:       General: She is awake. Eyes:      General: Lids are normal.      Extraocular Movements: Extraocular movements intact. Pupils: Pupils are equal, round, and reactive to light. Neurological:      Mental Status: She is alert. Deep Tendon Reflexes: Reflexes are normal and symmetric. Neurological Exam  Mental Status  Awake and alert. Speech: nonverbal. Follows one-step commands. Cranial Nerves  CN III, IV, VI: Extraocular movements intact bilaterally. Normal lids and orbits bilaterally. Pupils equal round and reactive to light bilaterally. CN V: Facial sensation is normal.  CN VII: Full and symmetric facial movement. CN VIII: Hearing is normal.  CN XI: Shoulder shrug strength is normal.  CN XII: Tongue midline without atrophy or fasciculations. Motor    Moves all extremities antigravity normal  strength b/l, intermittently follows commands so had difficulty assessing full strength but appears equal.    Sensory  Light touch is normal in upper and lower extremities. Reflexes  Deep tendon reflexes are 2+ and symmetric in all four extremities.     Coordination    Minimal resting tremor in left hand, no postural tremor,  Unable to perform CARLYLE but overall mild bradykinesia. She did have spasticity in the UE along with toritcollis. Flexion of b/l wrists, was able to overcome with passive ROM. stiral posturing of the toes in b/l feet intermittently. Involuntary movement of the feet was improved with wheel chair positioning. .    Gait    Patient presented in wheel chair, not ambulated due to safety concerns. .      I have personally reviewed the ROS performed by the MA.  ROS:    Review of Systems   Constitutional: Negative for appetite change, fatigue and fever. HENT: Negative. Negative for hearing loss, tinnitus, trouble swallowing and voice change. Eyes: Negative. Negative for photophobia, pain and visual disturbance. Respiratory: Negative. Negative for shortness of breath. Cardiovascular: Negative. Negative for palpitations. Gastrointestinal: Negative. Negative for nausea and vomiting. Endocrine: Negative. Negative for cold intolerance. Genitourinary: Negative. Negative for dysuria, frequency and urgency. Musculoskeletal: Negative for back pain, gait problem, myalgias and neck pain. Skin: Negative. Negative for rash. Allergic/Immunologic: Negative. Neurological: Positive for tremors. Negative for dizziness, seizures, syncope, facial asymmetry, speech difficulty, weakness, light-headedness, numbness and headaches. Hematological: Negative. Does not bruise/bleed easily. Psychiatric/Behavioral: Negative. Negative for confusion, hallucinations and sleep disturbance.

## 2023-09-01 NOTE — ASSESSMENT & PLAN NOTE
Patient's dystonic posturing appears improved since last visit. She has obtained new wheel chair with bolster and straps which has improved her posture, she also appears less fatigued and more alert as well. Last visit she was seen for sudden onset of wrist flexion/contracture and then weakness of the wrist. Since then she has had improvement of her wrist strength, ROM, and coordination of the right hand. She did have MRI brain completed with no acute findings, Mri cspine completed recently that did show degenerative changes along with spinal and foraminal stenosis to varying degrees. It is likely that her dystonia caused extreme flexion of the wrist which caused injury to the wrist and therefore she had weakness related to orthopedic injury/pain given she had rapid recovery of symptoms. I would suspect other weakness in the UE if this was caused by cervical radiculopathy. Given her improvement would have her cancel EMG as this would not  at this time. In regards to her spasticity and dystonia she will remain on her current dose of baclofen and continue follow up with PM&R for botox injections. As previously stated staff have noted improvement in her posturing with new wheelchair and last visit with PM&R they held off on botox injections. She will also continue the use of wrist/hand splints to assist with hand/wrist posturing/contracture. Plan for follow up in 6 months. To contact the office sooner with any concerns or worsening symptoms.

## 2023-09-01 NOTE — ASSESSMENT & PLAN NOTE
Stable on medication, she has had improvement of tremor with reduction of abilify and initiation of sinemet. She will continue her current dose of  Sinemet CR  mg 12 tabs BID.

## 2023-09-06 NOTE — RESULT ENCOUNTER NOTE
EMB negative for hyperplasia or malignancy  Communicated result to patient's caretaker, Maria D Baer  Plan to follow-up for annual exam accordingly      Gilmer Green MD  OB/GYN PGY-3

## 2023-09-07 ENCOUNTER — TELEPHONE (OUTPATIENT)
Dept: FAMILY MEDICINE CLINIC | Facility: CLINIC | Age: 63
End: 2023-09-07

## 2023-09-07 NOTE — TELEPHONE ENCOUNTER
Signature required  Folder (to be completed by): Dr. Matthew Davis     Name of Form: Physician's Order    Color Folder: Richard Campuzano    Form to be faxed to:  [phone]

## 2023-09-08 ENCOUNTER — TELEPHONE (OUTPATIENT)
Dept: FAMILY MEDICINE CLINIC | Facility: CLINIC | Age: 63
End: 2023-09-08

## 2023-09-08 NOTE — TELEPHONE ENCOUNTER
[facility]) wanted it listed in her     chart that she tested positive for Covid     09/07/2023    Dr Mark Marin folder

## 2023-10-05 ENCOUNTER — OFFICE VISIT (OUTPATIENT)
Dept: FAMILY MEDICINE CLINIC | Facility: CLINIC | Age: 63
End: 2023-10-05

## 2023-10-05 VITALS
WEIGHT: 105 LBS | TEMPERATURE: 98.3 F | SYSTOLIC BLOOD PRESSURE: 108 MMHG | BODY MASS INDEX: 21.95 KG/M2 | OXYGEN SATURATION: 96 % | HEART RATE: 74 BPM | DIASTOLIC BLOOD PRESSURE: 72 MMHG

## 2023-10-05 DIAGNOSIS — Z00.00 MEDICARE ANNUAL WELLNESS VISIT, SUBSEQUENT: Primary | ICD-10-CM

## 2023-10-05 DIAGNOSIS — Z23 ENCOUNTER FOR IMMUNIZATION: ICD-10-CM

## 2023-10-05 PROCEDURE — 90686 IIV4 VACC NO PRSV 0.5 ML IM: CPT | Performed by: FAMILY MEDICINE

## 2023-10-05 PROCEDURE — G0008 ADMIN INFLUENZA VIRUS VAC: HCPCS | Performed by: FAMILY MEDICINE

## 2023-10-05 PROCEDURE — G0439 PPPS, SUBSEQ VISIT: HCPCS | Performed by: FAMILY MEDICINE

## 2023-10-05 RX ORDER — ZOSTER VACCINE RECOMBINANT, ADJUVANTED 50 MCG/0.5
KIT INTRAMUSCULAR
Qty: 1 EACH | Refills: 1 | Status: SHIPPED | OUTPATIENT
Start: 2023-10-05

## 2023-10-05 NOTE — PATIENT INSTRUCTIONS
Medicare Preventive Visit Patient Instructions  Thank you for completing your Welcome to Medicare Visit or Medicare Annual Wellness Visit today. Your next wellness visit will be due in one year (10/5/2024). The screening/preventive services that you may require over the next 5-10 years are detailed below. Some tests may not apply to you based off risk factors and/or age. Screening tests ordered at today's visit but not completed yet may show as past due. Also, please note that scanned in results may not display below. Preventive Screenings:  Service Recommendations Previous Testing/Comments   Colorectal Cancer Screening  * Colonoscopy    * Fecal Occult Blood Test (FOBT)/Fecal Immunochemical Test (FIT)  * Fecal DNA/Cologuard Test  * Flexible Sigmoidoscopy Age: 43-73 years old   Colonoscopy: every 10 years (may be performed more frequently if at higher risk)  OR  FOBT/FIT: every 1 year  OR  Cologuard: every 3 years  OR  Sigmoidoscopy: every 5 years  Screening may be recommended earlier than age 39 if at higher risk for colorectal cancer. Also, an individualized decision between you and your healthcare provider will decide whether screening between the ages of 77-80 would be appropriate. Colonoscopy: 03/10/2022  FOBT/FIT: Not on file  Cologuard: Not on file  Sigmoidoscopy: Not on file    Screening Current     Breast Cancer Screening Age: 36 years old  Frequency: every 1-2 years  Not required if history of left and right mastectomy Mammogram: 12/19/2022    Screening Current   Cervical Cancer Screening Between the ages of 21-29, pap smear recommended once every 3 years. Between the ages of 32-69, can perform pap smear with HPV co-testing every 5 years.    Recommendations may differ for women with a history of total hysterectomy, cervical cancer, or abnormal pap smears in past. Pap Smear: 10/07/2022    Screening Current   Hepatitis C Screening Once for adults born between 1945 and 1965  More frequently in patients at high risk for Hepatitis C Hep C Antibody: 07/13/2021    Screening Current   Diabetes Screening 1-2 times per year if you're at risk for diabetes or have pre-diabetes Fasting glucose: 205 mg/dL (6/14/2023)  A1C: 6.9 % (6/14/2023)  Screening Not Indicated  History Diabetes   Cholesterol Screening Once every 5 years if you don't have a lipid disorder. May order more often based on risk factors. Lipid panel: 09/14/2022    Screening Not Indicated  History Lipid Disorder     Other Preventive Screenings Covered by Medicare:  1. Abdominal Aortic Aneurysm (AAA) Screening: covered once if your at risk. You're considered to be at risk if you have a family history of AAA. 2. Lung Cancer Screening: covers low dose CT scan once per year if you meet all of the following conditions: (1) Age 48-67; (2) No signs or symptoms of lung cancer; (3) Current smoker or have quit smoking within the last 15 years; (4) You have a tobacco smoking history of at least 20 pack years (packs per day multiplied by number of years you smoked); (5) You get a written order from a healthcare provider. 3. Glaucoma Screening: covered annually if you're considered high risk: (1) You have diabetes OR (2) Family history of glaucoma OR (3)  aged 48 and older OR (3)  American aged 72 and older  3. Osteoporosis Screening: covered every 2 years if you meet one of the following conditions: (1) You're estrogen deficient and at risk for osteoporosis based off medical history and other findings; (2) Have a vertebral abnormality; (3) On glucocorticoid therapy for more than 3 months; (4) Have primary hyperparathyroidism; (5) On osteoporosis medications and need to assess response to drug therapy. · Last bone density test (DXA Scan): 09/08/2022.  5. HIV Screening: covered annually if you're between the age of 15-65. Also covered annually if you are younger than 13 and older than 72 with risk factors for HIV infection.  For pregnant patients, it is covered up to 3 times per pregnancy. Immunizations:  Immunization Recommendations   Influenza Vaccine Annual influenza vaccination during flu season is recommended for all persons aged >= 6 months who do not have contraindications   Pneumococcal Vaccine   * Pneumococcal conjugate vaccine = PCV13 (Prevnar 13), PCV15 (Vaxneuvance), PCV20 (Prevnar 20)  * Pneumococcal polysaccharide vaccine = PPSV23 (Pneumovax) Adults 20-63 years old: 1-3 doses may be recommended based on certain risk factors  Adults 72 years old: 1-2 doses may be recommended based off what pneumonia vaccine you previously received   Hepatitis B Vaccine 3 dose series if at intermediate or high risk (ex: diabetes, end stage renal disease, liver disease)   Tetanus (Td) Vaccine - COST NOT COVERED BY MEDICARE PART B Following completion of primary series, a booster dose should be given every 10 years to maintain immunity against tetanus. Td may also be given as tetanus wound prophylaxis. Tdap Vaccine - COST NOT COVERED BY MEDICARE PART B Recommended at least once for all adults. For pregnant patients, recommended with each pregnancy. Shingles Vaccine (Shingrix) - COST NOT COVERED BY MEDICARE PART B  2 shot series recommended in those aged 48 and above     Health Maintenance Due:      Topic Date Due   • Cervical Cancer Screening  08/10/2024   • Breast Cancer Screening: Mammogram  12/19/2024   • Colorectal Cancer Screening  03/09/2027   • HIV Screening  Completed   • Hepatitis C Screening  Completed     Immunizations Due:      Topic Date Due   • Pneumococcal Vaccine: Pediatrics (0 to 5 Years) and At-Risk Patients (6 to 59 Years) (2 - PCV) 01/23/2019   • COVID-19 Vaccine (5 - Pfizer series) 10/24/2022   • Influenza Vaccine (1) 09/01/2023     Advance Directives   What are advance directives? Advance directives are legal documents that state your wishes and plans for medical care.  These plans are made ahead of time in case you lose your ability to make decisions for yourself. Advance directives can apply to any medical decision, such as the treatments you want, and if you want to donate organs. What are the types of advance directives? There are many types of advance directives, and each state has rules about how to use them. You may choose a combination of any of the following:  · Living will: This is a written record of the treatment you want. You can also choose which treatments you do not want, which to limit, and which to stop at a certain time. This includes surgery, medicine, IV fluid, and tube feedings. · Durable power of  for healthcare [facility]): This is a written record that states who you want to make healthcare choices for you when you are unable to make them for yourself. This person, called a proxy, is usually a family member or a friend. You may choose more than 1 proxy. · Do not resuscitate (DNR) order:  A DNR order is used in case your heart stops beating or you stop breathing. It is a request not to have certain forms of treatment, such as CPR. A DNR order may be included in other types of advance directives. · Medical directive: This covers the care that you want if you are in a coma, near death, or unable to make decisions for yourself. You can list the treatments you want for each condition. Treatment may include pain medicine, surgery, blood transfusions, dialysis, IV or tube feedings, and a ventilator (breathing machine). · Values history: This document has questions about your views, beliefs, and how you feel and think about life. This information can help others choose the care that you would choose. Why are advance directives important? An advance directive helps you control your care. Although spoken wishes may be used, it is better to have your wishes written down. Spoken wishes can be misunderstood, or not followed. Treatments may be given even if you do not want them.  An advance directive may make it easier for your family to make difficult choices about your care. Urinary Incontinence   Urinary incontinence (UI)  is when you lose control of your bladder. UI develops because your bladder cannot store or empty urine properly. The 3 most common types of UI are stress incontinence, urge incontinence, or both. Medicines:   · May be given to help strengthen your bladder control. Report any side effects of medication to your healthcare provider. Do pelvic muscle exercises often:  Your pelvic muscles help you stop urinating. Squeeze these muscles tight for 5 seconds, then relax for 5 seconds. Gradually work up to squeezing for 10 seconds. Do 3 sets of 15 repetitions a day, or as directed. This will help strengthen your pelvic muscles and improve bladder control. Train your bladder:  Go to the bathroom at set times, such as every 2 hours, even if you do not feel the urge to go. You can also try to hold your urine when you feel the urge to go. For example, hold your urine for 5 minutes when you feel the urge to go. As that becomes easier, hold your urine for 10 minutes. Self-care:   · Keep a UI record. Write down how often you leak urine and how much you leak. Make a note of what you were doing when you leaked urine. · Drink liquids as directed. You may need to limit the amount of liquid you drink to help control your urine leakage. Do not drink any liquid right before you go to bed. Limit or do not have drinks that contain caffeine or alcohol. · Prevent constipation. Eat a variety of high-fiber foods. Good examples are high-fiber cereals, beans, vegetables, and whole-grain breads. Walking is the best way to trigger your intestines to have a bowel movement. · Exercise regularly and maintain a healthy weight. Weight loss and exercise will decrease pressure on your bladder and help you control your leakage. · Use a catheter as directed  to help empty your bladder.  A catheter is a tiny, plastic tube that is put into your bladder to drain your urine. · Go to behavior therapy as directed. Behavior therapy may be used to help you learn to control your urge to urinate. © Copyright [employer] 2018 Information is for End User's use only and may not be sold, redistributed or otherwise used for commercial purposes. All illustrations and images included in CareNotes® are the copyrighted property of [employer]. or [facility] Preventive Visit Patient Instructions  Thank you for completing your Welcome to Medicare Visit or Medicare Annual Wellness Visit today. Your next wellness visit will be due in one year (10/5/2024). The screening/preventive services that you may require over the next 5-10 years are detailed below. Some tests may not apply to you based off risk factors and/or age. Screening tests ordered at today's visit but not completed yet may show as past due. Also, please note that scanned in results may not display below. Preventive Screenings:  Service Recommendations Previous Testing/Comments   Colorectal Cancer Screening  * Colonoscopy    * Fecal Occult Blood Test (FOBT)/Fecal Immunochemical Test (FIT)  * Fecal DNA/Cologuard Test  * Flexible Sigmoidoscopy Age: 43-73 years old   Colonoscopy: every 10 years (may be performed more frequently if at higher risk)  OR  FOBT/FIT: every 1 year  OR  Cologuard: every 3 years  OR  Sigmoidoscopy: every 5 years  Screening may be recommended earlier than age 39 if at higher risk for colorectal cancer. Also, an individualized decision between you and your healthcare provider will decide whether screening between the ages of 77-80 would be appropriate.  Colonoscopy: 03/10/2022  FOBT/FIT: Not on file  Cologuard: Not on file  Sigmoidoscopy: Not on file    Screening Current     Breast Cancer Screening Age: 36 years old  Frequency: every 1-2 years  Not required if history of left and right mastectomy Mammogram: 12/19/2022    Screening Current   Cervical Cancer Screening Between the ages of 24-27, pap smear recommended once every 3 years. Between the ages of 32-69, can perform pap smear with HPV co-testing every 5 years. Recommendations may differ for women with a history of total hysterectomy, cervical cancer, or abnormal pap smears in past. Pap Smear: 10/07/2022    Screening Current   Hepatitis C Screening Once for adults born between 1945 and 1965  More frequently in patients at high risk for Hepatitis C Hep C Antibody: 07/13/2021    Screening Current   Diabetes Screening 1-2 times per year if you're at risk for diabetes or have pre-diabetes Fasting glucose: 205 mg/dL (6/14/2023)  A1C: 6.9 % (6/14/2023)  Screening Not Indicated  History Diabetes   Cholesterol Screening Once every 5 years if you don't have a lipid disorder. May order more often based on risk factors. Lipid panel: 09/14/2022    Screening Not Indicated  History Lipid Disorder     Other Preventive Screenings Covered by Medicare:  6. Abdominal Aortic Aneurysm (AAA) Screening: covered once if your at risk. You're considered to be at risk if you have a family history of AAA. 7. Lung Cancer Screening: covers low dose CT scan once per year if you meet all of the following conditions: (1) Age 48-67; (2) No signs or symptoms of lung cancer; (3) Current smoker or have quit smoking within the last 15 years; (4) You have a tobacco smoking history of at least 20 pack years (packs per day multiplied by number of years you smoked); (5) You get a written order from a healthcare provider. 8. Glaucoma Screening: covered annually if you're considered high risk: (1) You have diabetes OR (2) Family history of glaucoma OR (3)  aged 48 and older OR (3)  American aged 72 and older  5.  Osteoporosis Screening: covered every 2 years if you meet one of the following conditions: (1) You're estrogen deficient and at risk for osteoporosis based off medical history and other findings; (2) Have a vertebral abnormality; (3) On glucocorticoid therapy for more than 3 months; (4) Have primary hyperparathyroidism; (5) On osteoporosis medications and need to assess response to drug therapy. · Last bone density test (DXA Scan): 09/08/2022.  10. HIV Screening: covered annually if you're between the age of 15-65. Also covered annually if you are younger than 13 and older than 72 with risk factors for HIV infection. For pregnant patients, it is covered up to 3 times per pregnancy. Immunizations:  Immunization Recommendations   Influenza Vaccine Annual influenza vaccination during flu season is recommended for all persons aged >= 6 months who do not have contraindications   Pneumococcal Vaccine   * Pneumococcal conjugate vaccine = PCV13 (Prevnar 13), PCV15 (Vaxneuvance), PCV20 (Prevnar 20)  * Pneumococcal polysaccharide vaccine = PPSV23 (Pneumovax) Adults 20-63 years old: 1-3 doses may be recommended based on certain risk factors  Adults 72 years old: 1-2 doses may be recommended based off what pneumonia vaccine you previously received   Hepatitis B Vaccine 3 dose series if at intermediate or high risk (ex: diabetes, end stage renal disease, liver disease)   Tetanus (Td) Vaccine - COST NOT COVERED BY MEDICARE PART B Following completion of primary series, a booster dose should be given every 10 years to maintain immunity against tetanus. Td may also be given as tetanus wound prophylaxis. Tdap Vaccine - COST NOT COVERED BY MEDICARE PART B Recommended at least once for all adults. For pregnant patients, recommended with each pregnancy.    Shingles Vaccine (Shingrix) - COST NOT COVERED BY MEDICARE PART B  2 shot series recommended in those aged 48 and above     Health Maintenance Due:      Topic Date Due   • Cervical Cancer Screening  08/10/2024   • Breast Cancer Screening: Mammogram  12/19/2024   • Colorectal Cancer Screening  03/09/2027   • HIV Screening  Completed   • Hepatitis C Screening  Completed Immunizations Due:      Topic Date Due   • Pneumococcal Vaccine: Pediatrics (0 to 5 Years) and At-Risk Patients (6 to 64 Years) (2 - PCV) 01/23/2019   • COVID-19 Vaccine (5 - Pfizer series) 10/24/2022   • Influenza Vaccine (1) 09/01/2023     Advance Directives   What are advance directives? Advance directives are legal documents that state your wishes and plans for medical care. These plans are made ahead of time in case you lose your ability to make decisions for yourself. Advance directives can apply to any medical decision, such as the treatments you want, and if you want to donate organs. What are the types of advance directives? There are many types of advance directives, and each state has rules about how to use them. You may choose a combination of any of the following:  · Living will: This is a written record of the treatment you want. You can also choose which treatments you do not want, which to limit, and which to stop at a certain time. This includes surgery, medicine, IV fluid, and tube feedings. · Durable power of  for healthcare [facility]): This is a written record that states who you want to make healthcare choices for you when you are unable to make them for yourself. This person, called a proxy, is usually a family member or a friend. You may choose more than 1 proxy. · Do not resuscitate (DNR) order:  A DNR order is used in case your heart stops beating or you stop breathing. It is a request not to have certain forms of treatment, such as CPR. A DNR order may be included in other types of advance directives. · Medical directive: This covers the care that you want if you are in a coma, near death, or unable to make decisions for yourself. You can list the treatments you want for each condition. Treatment may include pain medicine, surgery, blood transfusions, dialysis, IV or tube feedings, and a ventilator (breathing machine). · Values history:   This document has questions about your views, beliefs, and how you feel and think about life. This information can help others choose the care that you would choose. Why are advance directives important? An advance directive helps you control your care. Although spoken wishes may be used, it is better to have your wishes written down. Spoken wishes can be misunderstood, or not followed. Treatments may be given even if you do not want them. An advance directive may make it easier for your family to make difficult choices about your care. Urinary Incontinence   Urinary incontinence (UI)  is when you lose control of your bladder. UI develops because your bladder cannot store or empty urine properly. The 3 most common types of UI are stress incontinence, urge incontinence, or both. Medicines:   · May be given to help strengthen your bladder control. Report any side effects of medication to your healthcare provider. Do pelvic muscle exercises often:  Your pelvic muscles help you stop urinating. Squeeze these muscles tight for 5 seconds, then relax for 5 seconds. Gradually work up to squeezing for 10 seconds. Do 3 sets of 15 repetitions a day, or as directed. This will help strengthen your pelvic muscles and improve bladder control. Train your bladder:  Go to the bathroom at set times, such as every 2 hours, even if you do not feel the urge to go. You can also try to hold your urine when you feel the urge to go. For example, hold your urine for 5 minutes when you feel the urge to go. As that becomes easier, hold your urine for 10 minutes. Self-care:   · Keep a UI record. Write down how often you leak urine and how much you leak. Make a note of what you were doing when you leaked urine. · Drink liquids as directed. You may need to limit the amount of liquid you drink to help control your urine leakage. Do not drink any liquid right before you go to bed. Limit or do not have drinks that contain caffeine or alcohol. · Prevent constipation. Eat a variety of high-fiber foods. Good examples are high-fiber cereals, beans, vegetables, and whole-grain breads. Walking is the best way to trigger your intestines to have a bowel movement. · Exercise regularly and maintain a healthy weight. Weight loss and exercise will decrease pressure on your bladder and help you control your leakage. · Use a catheter as directed  to help empty your bladder. A catheter is a tiny, plastic tube that is put into your bladder to drain your urine. · Go to behavior therapy as directed. Behavior therapy may be used to help you learn to control your urge to urinate. © Copyright [address] [employer] 2018 Information is for End User's use only and may not be sold, redistributed or otherwise used for commercial purposes. All illustrations and images included in CareNotes® are the copyrighted property of [employer] or [facility] Preventive Visit Patient Instructions  Thank you for completing your Welcome to Medicare Visit or Medicare Annual Wellness Visit today. Your next wellness visit will be due in one year (10/5/2024). The screening/preventive services that you may require over the next 5-10 years are detailed below. Some tests may not apply to you based off risk factors and/or age. Screening tests ordered at today's visit but not completed yet may show as past due. Also, please note that scanned in results may not display below. Preventive Screenings:  Service Recommendations Previous Testing/Comments   Colorectal Cancer Screening  * Colonoscopy    * Fecal Occult Blood Test (FOBT)/Fecal Immunochemical Test (FIT)  * Fecal DNA/Cologuard Test  * Flexible Sigmoidoscopy Age: 43-73 years old   Colonoscopy: every 10 years (may be performed more frequently if at higher risk)  OR  FOBT/FIT: every 1 year  OR  Cologuard: every 3 years  OR  Sigmoidoscopy: every 5 years  Screening may be recommended earlier than age 39 if at higher risk for colorectal cancer. Also, an individualized decision between you and your healthcare provider will decide whether screening between the ages of 77-80 would be appropriate. Colonoscopy: 03/10/2022  FOBT/FIT: Not on file  Cologuard: Not on file  Sigmoidoscopy: Not on file    Screening Current     Breast Cancer Screening Age: 36 years old  Frequency: every 1-2 years  Not required if history of left and right mastectomy Mammogram: 12/19/2022    Screening Current   Cervical Cancer Screening Between the ages of 21-29, pap smear recommended once every 3 years. Between the ages of 32-69, can perform pap smear with HPV co-testing every 5 years. Recommendations may differ for women with a history of total hysterectomy, cervical cancer, or abnormal pap smears in past. Pap Smear: 10/07/2022    Screening Current   Hepatitis C Screening Once for adults born between 1945 and 1965  More frequently in patients at high risk for Hepatitis C Hep C Antibody: 07/13/2021    Screening Current   Diabetes Screening 1-2 times per year if you're at risk for diabetes or have pre-diabetes Fasting glucose: 205 mg/dL (6/14/2023)  A1C: 6.9 % (6/14/2023)  Screening Not Indicated  History Diabetes   Cholesterol Screening Once every 5 years if you don't have a lipid disorder. May order more often based on risk factors. Lipid panel: 09/14/2022    Screening Not Indicated  History Lipid Disorder     Other Preventive Screenings Covered by Medicare:  11. Abdominal Aortic Aneurysm (AAA) Screening: covered once if your at risk. You're considered to be at risk if you have a family history of AAA.   12. Lung Cancer Screening: covers low dose CT scan once per year if you meet all of the following conditions: (1) Age 48-67; (2) No signs or symptoms of lung cancer; (3) Current smoker or have quit smoking within the last 15 years; (4) You have a tobacco smoking history of at least 20 pack years (packs per day multiplied by number of years you smoked); (5) You get a written order from a healthcare provider. 15. Glaucoma Screening: covered annually if you're considered high risk: (1) You have diabetes OR (2) Family history of glaucoma OR (3)  aged 48 and older OR (3)  American aged 72 and older  15. Osteoporosis Screening: covered every 2 years if you meet one of the following conditions: (1) You're estrogen deficient and at risk for osteoporosis based off medical history and other findings; (2) Have a vertebral abnormality; (3) On glucocorticoid therapy for more than 3 months; (4) Have primary hyperparathyroidism; (5) On osteoporosis medications and need to assess response to drug therapy. · Last bone density test (DXA Scan): 09/08/2022. 15. HIV Screening: covered annually if you're between the age of 14-79. Also covered annually if you are younger than 13 and older than 72 with risk factors for HIV infection. For pregnant patients, it is covered up to 3 times per pregnancy. Immunizations:  Immunization Recommendations   Influenza Vaccine Annual influenza vaccination during flu season is recommended for all persons aged >= 6 months who do not have contraindications   Pneumococcal Vaccine   * Pneumococcal conjugate vaccine = PCV13 (Prevnar 13), PCV15 (Vaxneuvance), PCV20 (Prevnar 20)  * Pneumococcal polysaccharide vaccine = PPSV23 (Pneumovax) Adults 20-63 years old: 1-3 doses may be recommended based on certain risk factors  Adults 72 years old: 1-2 doses may be recommended based off what pneumonia vaccine you previously received   Hepatitis B Vaccine 3 dose series if at intermediate or high risk (ex: diabetes, end stage renal disease, liver disease)   Tetanus (Td) Vaccine - COST NOT COVERED BY MEDICARE PART B Following completion of primary series, a booster dose should be given every 10 years to maintain immunity against tetanus. Td may also be given as tetanus wound prophylaxis.    Tdap Vaccine - COST NOT COVERED BY MEDICARE PART B Recommended at least once for all adults. For pregnant patients, recommended with each pregnancy. Shingles Vaccine (Shingrix) - COST NOT COVERED BY MEDICARE PART B  2 shot series recommended in those aged 48 and above     Health Maintenance Due:      Topic Date Due   • Cervical Cancer Screening  08/10/2024   • Breast Cancer Screening: Mammogram  12/19/2024   • Colorectal Cancer Screening  03/09/2027   • HIV Screening  Completed   • Hepatitis C Screening  Completed     Immunizations Due:      Topic Date Due   • Pneumococcal Vaccine: Pediatrics (0 to 5 Years) and At-Risk Patients (6 to 59 Years) (2 - PCV) 01/23/2019   • COVID-19 Vaccine (5 - Pfizer series) 10/24/2022     Advance Directives   What are advance directives? Advance directives are legal documents that state your wishes and plans for medical care. These plans are made ahead of time in case you lose your ability to make decisions for yourself. Advance directives can apply to any medical decision, such as the treatments you want, and if you want to donate organs. What are the types of advance directives? There are many types of advance directives, and each state has rules about how to use them. You may choose a combination of any of the following:  · Living will: This is a written record of the treatment you want. You can also choose which treatments you do not want, which to limit, and which to stop at a certain time. This includes surgery, medicine, IV fluid, and tube feedings. · Durable power of  for healthcare [facility]): This is a written record that states who you want to make healthcare choices for you when you are unable to make them for yourself. This person, called a proxy, is usually a family member or a friend. You may choose more than 1 proxy. · Do not resuscitate (DNR) order:  A DNR order is used in case your heart stops beating or you stop breathing. It is a request not to have certain forms of treatment, such as CPR.  A DNR order may be included in other types of advance directives. · Medical directive: This covers the care that you want if you are in a coma, near death, or unable to make decisions for yourself. You can list the treatments you want for each condition. Treatment may include pain medicine, surgery, blood transfusions, dialysis, IV or tube feedings, and a ventilator (breathing machine). · Values history: This document has questions about your views, beliefs, and how you feel and think about life. This information can help others choose the care that you would choose. Why are advance directives important? An advance directive helps you control your care. Although spoken wishes may be used, it is better to have your wishes written down. Spoken wishes can be misunderstood, or not followed. Treatments may be given even if you do not want them. An advance directive may make it easier for your family to make difficult choices about your care. Urinary Incontinence   Urinary incontinence (UI)  is when you lose control of your bladder. UI develops because your bladder cannot store or empty urine properly. The 3 most common types of UI are stress incontinence, urge incontinence, or both. Medicines:   · May be given to help strengthen your bladder control. Report any side effects of medication to your healthcare provider. Do pelvic muscle exercises often:  Your pelvic muscles help you stop urinating. Squeeze these muscles tight for 5 seconds, then relax for 5 seconds. Gradually work up to squeezing for 10 seconds. Do 3 sets of 15 repetitions a day, or as directed. This will help strengthen your pelvic muscles and improve bladder control. Train your bladder:  Go to the bathroom at set times, such as every 2 hours, even if you do not feel the urge to go. You can also try to hold your urine when you feel the urge to go. For example, hold your urine for 5 minutes when you feel the urge to go. As that becomes easier, hold your urine for 10 minutes. Self-care:   · Keep a UI record. Write down how often you leak urine and how much you leak. Make a note of what you were doing when you leaked urine. · Drink liquids as directed. You may need to limit the amount of liquid you drink to help control your urine leakage. Do not drink any liquid right before you go to bed. Limit or do not have drinks that contain caffeine or alcohol. · Prevent constipation. Eat a variety of high-fiber foods. Good examples are high-fiber cereals, beans, vegetables, and whole-grain breads. Walking is the best way to trigger your intestines to have a bowel movement. · Exercise regularly and maintain a healthy weight. Weight loss and exercise will decrease pressure on your bladder and help you control your leakage. · Use a catheter as directed  to help empty your bladder. A catheter is a tiny, plastic tube that is put into your bladder to drain your urine. · Go to behavior therapy as directed. Behavior therapy may be used to help you learn to control your urge to urinate. © Copyright [employer] 2018 Information is for End User's use only and may not be sold, redistributed or otherwise used for commercial purposes.  All illustrations and images included in CareNotes® are the copyrighted property of [employer] or  [employer]

## 2023-10-05 NOTE — ASSESSMENT & PLAN NOTE
63year old female presenting for MAW. No current complaints. 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 Recently evaluated for post-menstrual bleeding with negative endometrial biopsy  · Osteoporosis - recent DEXA 2022 shows improvement with osteopenia.  Next due in 2 years  · Vaccines - flu vaccine today, ordered Shingrex to pharmacy and can administer next week at annual gyn visit    Plan:  · Will order labs for December at next visit - A1c, Microalbumin:Cr, Vit D, CMP  · Annual gyn exam scheduled for next week

## 2023-10-05 NOTE — PROGRESS NOTES
Assessment and Plan:     Problem List Items Addressed This Visit        Other    Medicare annual wellness visit, subsequent - Primary     63year old female presenting for MAW. No current complaints. 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 Recently evaluated for post-menstrual bleeding with negative endometrial biopsy  · Osteoporosis - recent DEXA 2022 shows improvement with osteopenia. Next due in 2 years  · Vaccines - flu vaccine today, ordered Shingrex to pharmacy and can administer next week at annual gyn visit    Plan:  · Will order labs for December at next visit - A1c, Microalbumin:Cr, Vit D, CMP  · Annual gyn exam scheduled for next week          Other Visit Diagnoses     Encounter for immunization        Relevant Medications    Zoster Vac Recomb Adjuvanted (Shingrix) 50 MCG/0.5ML SUSR    Other Relevant Orders    influenza vaccine, quadrivalent, 0.5 mL, preservative-free, for adult and pediatric patients 6 mos+ (AFLURIA, FLUARIX, FLULAVAL, FLUZONE) (Completed)           Preventive health issues were discussed with patient, and age appropriate screening tests were ordered as noted in patient's After Visit Summary. Personalized health advice and appropriate referrals for health education or preventive services given if needed, as noted in patient's After Visit Summary. History of Present Illness:     Patient presents for a Medicare Wellness Visit. Caregiver Lisa provided history, has no acute issues or concerns.  She reports that her right wrist pain/contracture has significantly improved and her neurologist seemed to believe that her dystonia probably caused extreme flexion of the wrist, leading to injury and pain. Right now, she is stable enough to use the wrist brace during the night and PRN during the day.       Patient Care Team:  Cecilio Smith MD as PCP - General (Family Medicine)  MD María Scales MD Edrie Police, MD as Endoscopist     Review of Systems:     Review of Systems   Unable to perform ROS: Patient nonverbal        Problem List:     Patient Active Problem List   Diagnosis   • 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[address])   • Hyperlipidemia   • Gastroesophageal reflux disease   • Mixed cerebral palsy ([address])   • Spasticity   • Ambulatory dysfunction   • Recurrent UTI (urinary tract infection)   • Bipolar disorder ([address])   • Severe Intellectual disability   • Type 2 diabetes mellitus, without long-term current use of insulin (HCC)   • Abnormal albumin   • Iron deficiency anemia   • Anxiety   • Atopic dermatitis   • Chondrodermatitis nodularis helicis of left ear   • Constipation   • Dry eye   • Gait abnormality   • Hypotension   • Hypothyroidism   • Menopause   • Osteoporosis   • Other chronic pain   • Peripheral neuropathy   • Physical deconditioning   • Postmenopausal vaginal bleeding   • Resting tremor   • Seasonal allergies   • Urinary incontinence   • Osteoarthritis of both hips   • Gait disturbance   • Severe sepsis (HCC)   • Hypernatremia   • Breast asymmetry   • History of fall   • History of vitamin D deficiency   • Dysphagia   • Underweight   • Eosinophilic leukocytosis   • Macrocytic anemia   • Abnormal finding on lung imaging   • Herpes zoster without complication   • Hypercalcemia   • 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HCC)   • Polyneuropathy associated with underlying disease ([address])   • Parkinsonism   • Dystonic cerebral palsy (HCC)   • Cervical dystonia   • Medicare annual wellness visit, subsequent   • Encounter for annual routine gynecological examination   • Viral illness   • Need for shingles vaccine   • Contracture of right hand   • Internal hemorrhoids   • Cataract   • Preop examination   • Developmental delay      Past Medical and Surgical History:     Past Medical History:   Diagnosis Date   • Adjustment disorder    • Altered mental status 12/11/2015   • Anemia    • Bipolar 1 disorder ([address])    • Cerebral palsy ([address])    • Chronic hypernatremia 02/06/2016   • Closed fracture of left hip ([address]) 01/19/2016   • Closed left hip fracture (HCC)     no surgery   • Constipation    • Dehydration 02/20/2016   • Developmental delay    • Diabetes mellitus ([address])    • Difficulty walking    • Disease of thyroid gland    • Diverticulosis    • Fracture of multiple ribs of right side    • Hip fracture ([address]) 07/26/2015    left   • Hyperlipidemia    • Hypernatremia 12/28/2018   • Hypotension    • Impulse control disorder    • Incontinence    • Intellectual disability due to developmental disorder, unspecified    • Microalbuminuria    • Neuropathy in diabetes [facility])    • Osteopathia    • Osteoporosis    • Peripheral neuropathy    • 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HCC)    • Thrombocytopenia ([address]) 07/31/2015     Past Surgical History:   Procedure Laterality Date   • ABDOMINAL SURGERY     • COLECTOMY MIN      re-anastomosis 7/22/16   • COLON SURGERY  1/31/16   • COLONOSCOPY N/A 07/21/2016    Procedure: COLONOSCOPY;  Surgeon: Lien Fonseca MD;  Location: [facility]; Service:    • COLONOSCOPY N/A 01/31/2016    Procedure: COLONOSCOPY;  Surgeon: Lien Fonseca MD;  [facility];  Service:    • COLONOSCOPY N/A 07/25/2017    Procedure: COLONOSCOPY;  Surgeon: Lien Fonseca MD;  Location: [facility];   Service: Colorectal   • COLOSTOMY     • EXPLORATORY LAPAROTOMY W/ BOWEL RESECTION N/A 01/31/2016    Procedure: exploratory laparotomy, left sigmoidectomy, coloproctostomy, take down splenic flexure, loop colostomy;  Surgeon: Lien Fonseca MD;  Location[facility];  Service:    • CT CLOSURE ENTEROSTOMY LG/SMALL INTESTINE N/A 07/22/2016    Procedure: SEGMENTAL COLECTOMY WITH COLOCOLOSTOMY;  Surgeon: Kenn Hollingsworth MD;  Location[facility];  Service: Colorectal   • UPPER GASTROINTESTINAL ENDOSCOPY        Family History:     Family History   Problem Relation Age of Onset   • Alcohol abuse Mother    • Alcohol abuse Father    • Diabetes Sister    • No Known Problems Sister    • No Known Problems Sister       Social History:     Social History     Socioeconomic History   • Marital status: Single     Spouse name: None   • Number of children: None   • Years of education: None   • Highest education level: None   Occupational History   • None   Tobacco Use   • Smoking status: Never   • Smokeless tobacco: Never   Vaping Use   • Vaping Use: Never used   Substance and Sexual Activity   • Alcohol use: Not Currently   • Drug use: No   • Sexual activity: Never   Other Topics Concern   • None   Social History Narrative    Lives in a group home      Social Determinants of Health     Financial Resource Strain: Low Risk  (10/2/2023)    Overall Financial Resource Strain (CARDIA)    • Difficulty of Paying Living Expenses: Not hard at all   Food Insecurity: No Food Insecurity (10/2/2023)    Hunger Vital Sign    • Worried About Running Out of Food in the Last Year: Never true    • Ran Out of Food in the Last Year: Never true   Transportation Needs: No Transportation Needs (10/2/2023)    PRAPARE - Transportation    • Lack of Transportation (Medical): No    • Lack of Transportation (Non-Medical):  No   Physical Activity: Not on file   Stress: No Stress Concern Present (12/30/2022)    [facility]    • Feeling of Stress : Not at all   Social Connections: Not on file   Intimate Partner Violence: Not At Risk (12/30/2022)    Humiliation, Afraid, Rape, and Kick questionnaire    • Fear of Current or Ex-Partner: No    • Emotionally Abused: No    • Physically Abused: No    • Sexually Abused: No   Housing Stability: Low Risk  (12/30/2022)    Housing Stability Vital Sign    • Unable to Pay for Housing in the Last Year: No    • Number of Places Lived in the Last Year: 1    • Unstable Housing in the Last Year: No      Medications and Allergies:     Current Outpatient Medications   Medication Sig Dispense Refill   • ARIPiprazole (ABILIFY) 2 mg tablet Take 1 tablet by mouth daily at bedtime     • baclofen 10 mg tablet Take 1 tablet (10 mg total) by mouth 3 (three) times a day 90 tablet 11   • benzonatate (TESSALON PERLES) 100 mg capsule TAKE 1 CAPSULE BY MOUTH EVERY 8 HOURS AS NEEDED FOR COUGH **DO NOT CRUSH** 5 capsule 11   • Botox 100 units INJECT UP  UNITS IN THE MUSCLE INTO LOWER AND UPPER EXTREMITIES ONCE EVERY 3 MONTHS 3 each 0   • calcium carbonate (OS-MORRIS) 600 MG tablet Take 1 tablet (600 mg total) by mouth 2 (two) times a day with meals 60 tablet 2   • carbamide peroxide (DEBROX) 6.5 % otic solution 2 drops 2 drops in the affected ear prn x 5 days     • carbidopa-levodopa (SINEMET CR)  mg TBCR per ER tablet Take 2 tablets by mouth 2 (two) times a day Take morning dose 7 Am and afternoon dose at 4  tablet 3   • cholecalciferol (VITAMIN D3) 1,000 units tablet Take 1 tablet (1,000 Units total) by mouth daily 90 tablet 3   • citalopram (CeleXA) 40 mg tablet Take 1 tablet by mouth daily     • Denosumab (PROLIA SC) Inject under the skin     • DIABETIC TUSSIN  MG/5ML oral liquid TAKE 2 TEASPOONSFUL (10ML) BY MOUTH EVERY 4 HOURS AS NEEDED FOR COUGH & CONGESTION 118 mL 11   • divalproex sodium (DEPAKOTE SPRINKLE) 125 MG capsule 4 capsules 2 (two) times a day 4 caps BID     • docosanol (ABREVA) 10 % Apply topically 5 (five) times a day Apply and rub in 5x a day until healed as needed for lesion  0   • ferrous sulfate 324 (65 Fe) mg Take 1 tablet by mouth 2 (two) times a day     • fexofenadine (ALLEGRA) 30 MG/5ML suspension Take 30 mL (180 mg total) by mouth daily as needed (seasonal allergies) 237 mL 3   • fluticasone (FLONASE) 50 mcg/act nasal spray 1 spray into each nostril daily as needed for rhinitis or allergies 1 Bottle 1   • glimepiride (AMARYL) 1 mg tablet Take 1 tablet (1 mg total) by mouth daily with breakfast 90 tablet 3   • hydrocortisone 1 % cream Apply topically to affected area twice daily as needed for rash 30 g 0   • levothyroxine (SYNTHROID) 25 mcg tablet Take 1 tablet (25 mcg) by mouth every morning  0   • LORazepam (ATIVAN) 0.5 mg tablet Take 0.5 mg by mouth daily Every day at 8pm     • metFORMIN (GLUCOPHAGE) 1000 MG tablet Take 1 tablet (1,000 mg total) by mouth 2 (two) times a day with meals 60 tablet 2   • neomycin-bacitracin-polymyxin b (NEOSPORIN) ointment Apply 1 application topically 2 (two) times a day as needed Apply to affected area BID PRN     • nitrofurantoin (MACRODANTIN) 50 mg capsule Take 1 capsule by mouth daily at bedtime     • olopatadine HCl (PATADAY) 0.2 % opth drops Administer 1 drop to both eyes as needed Instill 1 drop into affected eyes daily PRN for eye redness     • oxybutynin (DITROPAN) 5 mg tablet      • Oxybutynin Chloride (DITROPAN XL PO) Take 5 mg by mouth 3 (three) times a day 1 tablet TID     • polyethylene glycol (GLYCOLAX) 17 GM/SCOOP powder DISSOLVE 17GM (1 CAPFUL) IN 8 OZ FLUIDS AND CONSUME BY MOUTH EVERY MORNING *HOLD 1 DAY FOR LOOSE STOOLS* (CONSTIPATION) 510 g 10   • Refresh Liquigel 1 % GEL      • simvastatin (ZOCOR) 20 mg tablet Take 1 tablet (20 mg total) by mouth daily with dinner at 4 pm for hyperlipidemia     • Starch, Thickening, POWD Take by mouth daily Use on food and liquid as directed 1020 g 3   • traZODone (DESYREL) 100 mg tablet Take 1 tablet (100 mg total) by mouth daily at  8 pm for depression 90 tablet 0   • Vitamins A & D (VITAMIN A & D) ointment Apply topically as needed for skin irritation     • Zoster Vac Recomb Adjuvanted (Shingrix) 50 MCG/0.5ML SUSR 0.5mL IM for one dose, followed by 0.5mL IM 2-6 months after first dose 1 each 1   • omeprazole (PriLOSEC) 20 mg delayed release capsule Take 1 capsule (20 mg total) by mouth 2 (two) times a day before meals 60 capsule 2     No current facility-administered medications for this visit. Allergies   Allergen Reactions   • Keflex [Cephalexin] GI Intolerance      Immunizations:     Immunization History   Administered Date(s) Administered   • COVID-19 PFIZER VACCINE 0.3 ML IM 01/22/2021, 02/12/2021, 11/16/2021, 08/29/2022   • H1N1, All Formulations 04/07/2010   • Hep B, adult 01/16/2015, 03/20/2015, 08/24/2015   • Influenza Quadrivalent Preservative Free 3 years and older IM 11/17/2014, 11/09/2015, 10/28/2016   • Influenza Quadrivalent, 6-35 Months IM 10/26/2017   • Influenza, injectable, quadrivalent, preservative free 0.5 mL 11/07/2018, 10/05/2023   • Influenza, recombinant, quadrivalent,injectable, preservative free 10/28/2019, 09/25/2020, 12/02/2021, 12/30/2022   • Influenza, seasonal, injectable 10/08/2013   • Pneumococcal Polysaccharide PPV23 11/04/2008, 01/23/2018   • Tdap 11/04/2008, 06/06/2018   • Tuberculin Skin Test-PPD Intradermal 04/11/2016, 01/23/2018, 11/26/2019, 06/25/2021      Health Maintenance:         Topic Date Due   • Cervical Cancer Screening  08/10/2024   • Breast Cancer Screening: Mammogram  12/19/2024   • Colorectal Cancer Screening  03/09/2027   • HIV Screening  Completed   • Hepatitis C Screening  Completed         Topic Date Due   • Pneumococcal Vaccine: Pediatrics (0 to 5 Years) and At-Risk Patients (6 to 59 Years) (2 - PCV) 01/23/2019   • COVID-19 Vaccine (5 - Pfizer series) 10/24/2022      Medicare Screening Tests and Risk Assessments:     Mihaela Wood is here for her Subsequent Wellness visit. Last Medicare Wellness visit information reviewed, patient interviewed, no change since last AWV. Historian  Patient cannot answer questions due to cognitive impairment, intelluctual disability, or expressive limitations.  Information provided by: caregiver. Health Risk Assessment:   Patient rates overall health as fair. Patient feels that their physical health rating is slightly worse. Patient is satisfied with their life. Eyesight was rated as same. Hearing was rated as same. Patient feels that their emotional and mental health rating is same. Patients states they are never, rarely angry. Patient states they are sometimes unusually tired/fatigued. Pain experienced in the last 7 days has been none. Patient states that she has experienced no weight loss or gain in last 6 months. Depression Screening:   PHQ-2 Score: 1      Fall Risk Screening: In the past year, patient has experienced: no history of falling in past year      Urinary Incontinence Screening:   Patient has leaked urine accidently in the last six months. Urinary incontinence at baseline     Home Safety:  Patient does not have trouble with stairs inside or outside of their home. Patient has working smoke alarms and has working carbon monoxide detector. Home safety hazards include: none. Nutrition:   Current diet is Diabetic. 1500 jerry ADA  puree diet; with Honey thickened liquids    Medications:   Patient is not currently taking any over-the-counter supplements. Patient is not able to manage medications. Nurses administer    Activities of Daily Living (ADLs)/Instrumental Activities of Daily Living (IADLs):   Walk and transfer into and out of bed and chair?: No  Dress and groom yourself?: No    Bathe or shower yourself?: No    Feed yourself? No  Do your laundry/housekeeping?: No  Manage your money, pay your bills and track your expenses?: No  Make your own meals?: No    Do your own shopping?: No    ADL comments: Lives in [facility] facility    Durable Medical Equipment Suppliers  [employer]    Previous Hospitalizations:   Any hospitalizations or ED visits within the last 12 months?: No      Advance Care Planning:   Living will: No    Durable POA for healthcare:  Yes    Advanced directive: No      Cognitive Screening:   Provider or family/friend/caregiver concerned regarding cognition?: No    PREVENTIVE SCREENINGS      Cardiovascular Screening:    General: Screening Not Indicated and History Lipid Disorder      Diabetes Screening:     General: Screening Not Indicated and History Diabetes      Colorectal Cancer Screening:     General: Screening Current      Breast Cancer Screening:     General: Screening Current      Cervical Cancer Screening:    General: Screening Current      Osteoporosis Screening:    General: Screening Not Indicated and History Osteoporosis      Abdominal Aortic Aneurysm (AAA) Screening:        General: Screening Not Indicated      Lung Cancer Screening:     General: Screening Not Indicated      Hepatitis C Screening:    General: Screening Current    Screening, Brief Intervention, and Referral to Treatment (SBIRT)    Screening  Typical number of drinks in a day: 0  Typical number of drinks in a week: 0  Interpretation: Low risk drinking behavior. AUDIT-C Screenin) How often did you have a drink containing alcohol in the past year? never  2) How many drinks did you have on a typical day when you were drinking in the past year? 0  3) How often did you have 6 or more drinks on one occasion in the past year? never    AUDIT-C Score: 0  Interpretation: Score 0-2 (female): Negative screen for alcohol misuse    Single Item Drug Screening:  How often have you used an illegal drug (including marijuana) or a prescription medication for non-medical reasons in the past year? never    Single Item Drug Screen Score: 0  Interpretation: Negative screen for possible drug use disorder    Other Counseling Topics:   Car/seat belt/driving safety, skin self-exam and calcium and vitamin D intake. No results found.       Physical Exam:     /72 (BP Location: Left arm, Patient Position: Sitting, Cuff Size: Standard)   Pulse 74   Temp 98.3 °F (36.8 °C) (Temporal)   Wt 47.6 kg (105 lb)   LMP 11/29/2009 (Exact Date)   SpO2 96%   BMI 21.95 kg/m²     Physical Exam  Vitals reviewed. Constitutional:       General: She is not in acute distress. Appearance: She is well-developed. HENT:      Head: Normocephalic and atraumatic. Mouth/Throat:      Mouth: Mucous membranes are moist.      Pharynx: Oropharynx is clear. Eyes:      Extraocular Movements: Extraocular movements intact. Conjunctiva/sclera: Conjunctivae normal.   Cardiovascular:      Rate and Rhythm: Normal rate and regular rhythm. Heart sounds: Normal heart sounds. No murmur heard. Pulmonary:      Effort: Pulmonary effort is normal. No respiratory distress. Breath sounds: Normal breath sounds. Abdominal:      General: Abdomen is flat. Bowel sounds are normal.      Palpations: Abdomen is soft. Tenderness: There is no abdominal tenderness. Musculoskeletal:         General: No swelling. Cervical back: Neck supple. Right lower leg: No edema. Left lower leg: No edema. Comments: Improved right wrist flexion, extension   Wheelchair bound    Skin:     General: Skin is warm and dry. Neurological:      Mental Status: She is alert.           Mitch Puri MD

## 2023-10-12 ENCOUNTER — ANNUAL EXAM (OUTPATIENT)
Dept: FAMILY MEDICINE CLINIC | Facility: CLINIC | Age: 63
End: 2023-10-12

## 2023-10-12 VITALS
TEMPERATURE: 97.6 F | OXYGEN SATURATION: 97 % | DIASTOLIC BLOOD PRESSURE: 68 MMHG | HEART RATE: 88 BPM | SYSTOLIC BLOOD PRESSURE: 105 MMHG

## 2023-10-12 DIAGNOSIS — F31.9 BIPOLAR AFFECTIVE DISORDER, REMISSION STATUS UNSPECIFIED (HCC): ICD-10-CM

## 2023-10-12 DIAGNOSIS — Z23 ENCOUNTER FOR IMMUNIZATION: ICD-10-CM

## 2023-10-12 DIAGNOSIS — E55.9 VITAMIN D DEFICIENCY: ICD-10-CM

## 2023-10-12 DIAGNOSIS — E11.00 TYPE 2 DIABETES MELLITUS WITH HYPEROSMOLARITY WITHOUT COMA, WITHOUT LONG-TERM CURRENT USE OF INSULIN (HCC): ICD-10-CM

## 2023-10-12 DIAGNOSIS — M81.0 OSTEOPOROSIS WITHOUT CURRENT PATHOLOGICAL FRACTURE, UNSPECIFIED OSTEOPOROSIS TYPE: ICD-10-CM

## 2023-10-12 DIAGNOSIS — Z01.419 ENCOUNTER FOR ANNUAL ROUTINE GYNECOLOGICAL EXAMINATION: Primary | ICD-10-CM

## 2023-10-12 PROCEDURE — 90471 IMMUNIZATION ADMIN: CPT

## 2023-10-12 PROCEDURE — 99396 PREV VISIT EST AGE 40-64: CPT | Performed by: FAMILY MEDICINE

## 2023-10-12 PROCEDURE — 90750 HZV VACC RECOMBINANT IM: CPT

## 2023-10-12 NOTE — ASSESSMENT & PLAN NOTE
63 YOF here for annual gyn exam. No concerns at this time   Screening exams:  Pap/HPV - last performed in 8/10/21 which returned negative. Repeat in 5 years and if negative, pt is complete with cervical cancer screening   Recent evaluation for postmenopausal bleeding, completed TVUS which showed endometrial thickening. Completed endometrial biopsy which returned negative. Colonoscopy - completed in 3/10/22, due for repeat in 5 years due to inadequate bowel prep  Mammogram - last completed 12/19/22, negative. Scheduled for 12/2023  Hx of osteoporosis - on Prolia, next shot due in December, ordered CMP for 1 week prior   Also ordered other labs to review prior to next follow-up in December - A1c, Microalbumin:Cr, Vit D levels, CMP, CK  Vaccines - Shingles vaccine 1/2 today.  Can get 2nd dose in January, then get PCV20 vaccine later in spring

## 2023-10-12 NOTE — PROGRESS NOTES
ANNUAL GYN VISIT:    Encounter for annual routine gynecological examination  63 YOF here for annual gyn exam. No concerns at this time   Screening exams:  Pap/HPV - last performed in 8/10/21 which returned negative. Repeat in 5 years and if negative, pt is complete with cervical cancer screening   Recent evaluation for postmenopausal bleeding, completed TVUS which showed endometrial thickening. Completed endometrial biopsy which returned negative. Colonoscopy - completed in 3/10/22, due for repeat in 5 years due to inadequate bowel prep  Mammogram - last completed 12/19/22, negative. Scheduled for 12/2023  Hx of osteoporosis - on Prolia, next shot due in December, ordered CMP for 1 week prior   Also ordered other labs to review prior to next follow-up in December - A1c, Microalbumin:Cr, Vit D levels, CMP, CK  Vaccines - Shingles vaccine 1/2 today. Can get 2nd dose in January, then get PCV20 vaccine later in spring       Randal Trujillo was seen today for gynecologic exam.    Diagnoses and all orders for this visit:    Encounter for annual routine gynecological examination    Type 2 diabetes mellitus with hyperosmolarity without coma, without long-term current use of insulin ([address])  -     HEMOGLOBIN A1C W/ EAG ESTIMATION; Future  -     Albumin / creatinine urine ratio; Future    Bipolar affective disorder, remission status unspecified ([address])  -     Comprehensive metabolic panel; Future  -     CK; Future    Vitamin D deficiency    Osteoporosis without current pathological fracture, unspecified osteoporosis type  -     Vitamin D 25 hydroxy; Future    Encounter for immunization  -     Zoster Vaccine Recombinant IM           Subjective      Helen Pandya is a 66 y.o. female who presents for annual well woman exam. No intermenstrual bleeding, spotting, or discharge. Was previously on Depo-Provera injections so unclear when she truly became menopausal - Increased FSH was 9/13/12, last LMP was on 9/10/08.      GYN:  Denies vaginal discharge, labial erythema or lesions, dyspareunia. Menarche unknown   Contraception: None  Patient is not sexually active  Last pap smear/HPV was in 8/10/21. Results were negative. No gynecologic surgeries. OB:   female  No pregnancies     :  Denies dysuria, urinary frequency or urgency. Denies hematuria, flank pain, incontinence. Breast:  Denies breast mass, skin changes, dimpling, reddening, nipple retraction. Denies breast discharge. Last mammogram was in 2022. Results were negative. Unknown family history of breast, endometrial, or ovarian ca. General:  Diet: Good  Exercise: N/A  Work: N/A  ETOH use: Never  Tobacco use: Never  Recreational drug use: Never    Screening:  Cervical cancer: last pap smear/HPV in . Results were negative. Breast cancer: last mammogram in 2022. Results were negative. Colon cancer: last colonoscopy in 3/10/2022. Results were overall negative but she is due for repeat in 5 years due to inadequate bowel prep. STD screening: N/A. Review of Systems   Unable to perform ROS: Patient nonverbal         Objective   Vitals:    10/12/23 1414   BP: 105/68   Pulse: 88   Temp: 97.6 °F (36.4 °C)   SpO2: 97%           Physical Exam  Vitals reviewed. Exam conducted with a chaperone present. Constitutional:       General: She is not in acute distress. Appearance: Normal appearance. HENT:      Head: Normocephalic and atraumatic. Nose: Nose normal.   Eyes:      Extraocular Movements: Extraocular movements intact. Conjunctiva/sclera: Conjunctivae normal.   Cardiovascular:      Rate and Rhythm: Normal rate and regular rhythm. Heart sounds: Normal heart sounds. No murmur heard. Pulmonary:      Effort: Pulmonary effort is normal. No respiratory distress. Breath sounds: Normal breath sounds. No wheezing or rales. Chest:      Chest wall: No mass or lacerations. Breasts: Danny Score is 5.       Breasts are symmetrical.      Right: Normal.      Left: Normal.   Abdominal:      General: Abdomen is flat. There is no distension. Palpations: Abdomen is soft. Tenderness: There is no abdominal tenderness. There is no guarding or rebound. Lymphadenopathy:      Upper Body:      Right upper body: No axillary adenopathy. Left upper body: No axillary adenopathy. Neurological:      Mental Status: She is alert and oriented to person, place, and time.

## 2023-10-18 DIAGNOSIS — E11.69 TYPE 2 DIABETES MELLITUS WITH OTHER SPECIFIED COMPLICATION, WITHOUT LONG-TERM CURRENT USE OF INSULIN (HCC): ICD-10-CM

## 2023-10-19 RX ORDER — GLIMEPIRIDE 1 MG/1
TABLET ORAL
Qty: 28 TABLET | Refills: 10 | Status: SHIPPED | OUTPATIENT
Start: 2023-10-19

## 2023-10-30 ENCOUNTER — TELEPHONE (OUTPATIENT)
Dept: FAMILY MEDICINE CLINIC | Facility: CLINIC | Age: 63
End: 2023-10-30

## 2023-10-30 NOTE — TELEPHONE ENCOUNTER
[facility] patient care solutions form  Blood glucose strips  Dr. Hung Young signed 10/30/23  Faxed to [phone]  Placed in yellow clerical folder in front to scan to chart

## 2023-11-02 ENCOUNTER — OFFICE VISIT (OUTPATIENT)
Dept: FAMILY MEDICINE CLINIC | Facility: CLINIC | Age: 63
End: 2023-11-02

## 2023-11-02 ENCOUNTER — TELEPHONE (OUTPATIENT)
Dept: FAMILY MEDICINE CLINIC | Facility: CLINIC | Age: 63
End: 2023-11-02

## 2023-11-02 VITALS
WEIGHT: 105 LBS | TEMPERATURE: 97.6 F | BODY MASS INDEX: 22.04 KG/M2 | SYSTOLIC BLOOD PRESSURE: 99 MMHG | RESPIRATION RATE: 18 BRPM | HEART RATE: 76 BPM | DIASTOLIC BLOOD PRESSURE: 69 MMHG | OXYGEN SATURATION: 98 % | HEIGHT: 58 IN

## 2023-11-02 DIAGNOSIS — R13.10 DYSPHAGIA, UNSPECIFIED TYPE: Primary | ICD-10-CM

## 2023-11-02 PROCEDURE — 99213 OFFICE O/P EST LOW 20 MIN: CPT | Performed by: FAMILY MEDICINE

## 2023-11-02 NOTE — ASSESSMENT & PLAN NOTE
Concerns about worsening dysphagia brought up by staff  Last documented barium swallow study from 2/1/2022 -   Pt continues to present w/ at least moderate oropharyngeal dysphagia characterized by decreased mastication abilities and decreased oral control w/ liquids. Aspiration was observed w/ HTL via teaspoon x1 before the swallow and inconsistent trace penetration w/ NTL by teaspoon. Recommendations at that time - "Continue w/ current diet; puree and HTL by teaspoon. Continue meds crushed in applesauce. Reminders to position head in neutral position when eating/drinking"    Plan:   Will order updated barium swallow study  Also placed home health referral for home speech therapy

## 2023-11-02 NOTE — PROGRESS NOTES
Name: Rickie Chappell      : 1960      MRN: [id number]  Encounter Provider: Bora Ball MD  Encounter Date: 2023   Encounter department: [address]     1. Dysphagia, unspecified type  Assessment & Plan:  Concerns about worsening dysphagia brought up by staff  Last documented barium swallow study from 2022 -   Pt continues to present w/ at least moderate oropharyngeal dysphagia characterized by decreased mastication abilities and decreased oral control w/ liquids. Aspiration was observed w/ HTL via teaspoon x1 before the swallow and inconsistent trace penetration w/ NTL by teaspoon. Recommendations at that time - "Continue w/ current diet; puree and HTL by teaspoon. Continue meds crushed in applesauce. Reminders to position head in neutral position when eating/drinking"    Plan: Will order updated barium swallow study  Also placed home health referral for home speech therapy     Orders:  -     [place] barium swallow video w speech; Future; Expected date: 2023  -     Referral to [address]; Future           Subjective      Patient presents today for concerns of worsening dysphagia. History provided by caregivers. There were concerns brought up from the staff that the patient was having worsening dysphagia including holding food in her mouth, more tongue thrusting, puckering her lips. They noticed this happens more in the morning. She does have one-to-one feeding and requires more slower feeds due to history of dysphagia. Last barium swallow study was on 2022, she was noted to have moderate oropharyngeal dysphagia and was recommended to continue her dysphagia diet. Caregivers are requesting updated swallow study and home health referral for home speech therapy.          Review of Systems   Unable to perform ROS: Patient nonverbal       Current Outpatient Medications on File Prior to Visit   Medication Sig ARIPiprazole (ABILIFY) 2 mg tablet Take 1 tablet by mouth daily at bedtime    baclofen 10 mg tablet Take 1 tablet (10 mg total) by mouth 3 (three) times a day    benzonatate (TESSALON PERLES) 100 mg capsule TAKE 1 CAPSULE BY MOUTH EVERY 8 HOURS AS NEEDED FOR COUGH **DO NOT CRUSH**    Botox 100 units INJECT UP  UNITS IN THE MUSCLE INTO LOWER AND UPPER EXTREMITIES ONCE EVERY 3 MONTHS    calcium carbonate (OS-MORRIS) 600 MG tablet Take 1 tablet (600 mg total) by mouth 2 (two) times a day with meals    carbamide peroxide (DEBROX) 6.5 % otic solution 2 drops 2 drops in the affected ear prn x 5 days    carbidopa-levodopa (SINEMET CR)  mg TBCR per ER tablet Take 2 tablets by mouth 2 (two) times a day Take morning dose 7 Am and afternoon dose at 4 PM    cholecalciferol (VITAMIN D3) 1,000 units tablet Take 1 tablet (1,000 Units total) by mouth daily    citalopram (CeleXA) 40 mg tablet Take 1 tablet by mouth daily    Denosumab (PROLIA SC) Inject under the skin    DIABETIC TUSSIN  MG/5ML oral liquid TAKE 2 TEASPOONSFUL (10ML) BY MOUTH EVERY 4 HOURS AS NEEDED FOR COUGH & CONGESTION    divalproex sodium (DEPAKOTE SPRINKLE) 125 MG capsule 4 capsules 2 (two) times a day 4 caps BID    docosanol (ABREVA) 10 % Apply topically 5 (five) times a day Apply and rub in 5x a day until healed as needed for lesion    ferrous sulfate 324 (65 Fe) mg Take 1 tablet by mouth 2 (two) times a day    fexofenadine (ALLEGRA) 30 MG/5ML suspension Take 30 mL (180 mg total) by mouth daily as needed (seasonal allergies)    fluticasone (FLONASE) 50 mcg/act nasal spray 1 spray into each nostril daily as needed for rhinitis or allergies    glimepiride (AMARYL) 1 mg tablet TAKE 1 TABLET BY MOUTH DAILY WITH BREAKFAST (DX:DM TYPE 2)    hydrocortisone 1 % cream Apply topically to affected area twice daily as needed for rash    levothyroxine (SYNTHROID) 25 mcg tablet Take 1 tablet (25 mcg) by mouth every morning    LORazepam (ATIVAN) 0.5 mg tablet Take 0.5 mg by mouth daily Every day at 8pm    metFORMIN (GLUCOPHAGE) 1000 MG tablet Take 1 tablet (1,000 mg total) by mouth 2 (two) times a day with meals    neomycin-bacitracin-polymyxin b (NEOSPORIN) ointment Apply 1 application topically 2 (two) times a day as needed Apply to affected area BID PRN    nitrofurantoin (MACRODANTIN) 50 mg capsule Take 1 capsule by mouth daily at bedtime    olopatadine HCl (PATADAY) 0.2 % opth drops Administer 1 drop to both eyes as needed Instill 1 drop into affected eyes daily PRN for eye redness    omeprazole (PriLOSEC) 20 mg delayed release capsule Take 1 capsule (20 mg total) by mouth 2 (two) times a day before meals    Oxybutynin Chloride (DITROPAN XL PO) Take 5 mg by mouth 3 (three) times a day 1 tablet TID    polyethylene glycol (GLYCOLAX) 17 GM/SCOOP powder DISSOLVE 17GM (1 CAPFUL) IN 8 OZ FLUIDS AND CONSUME BY MOUTH EVERY MORNING *HOLD 1 DAY FOR LOOSE STOOLS* (CONSTIPATION)    Refresh Liquigel 1 % GEL 2 (two) times a day 1 drop Bid bilaterally    simvastatin (ZOCOR) 20 mg tablet Take 1 tablet (20 mg total) by mouth daily with dinner at 4 pm for hyperlipidemia    Starch, Thickening, POWD Take by mouth daily Use on food and liquid as directed    traZODone (DESYREL) 100 mg tablet Take 1 tablet (100 mg total) by mouth daily at  8 pm for depression    Vitamins A & D (VITAMIN A & D) ointment Apply topically as needed for skin irritation    Zoster Vac Recomb Adjuvanted (Shingrix) 50 MCG/0.5ML SUSR 0.5mL IM for one dose, followed by 0.5mL IM 2-6 months after first dose    oxybutynin (DITROPAN) 5 mg tablet  (Patient not taking: Reported on 11/2/2023)       Objective     BP 99/69 (BP Location: Left arm, Patient Position: Sitting, Cuff Size: Large)   Pulse 76   Temp 97.6 °F (36.4 °C) (Temporal)   Resp 18   Ht 4' 10" (1.473 m)   Wt 47.6 kg (105 lb)   LMP 11/29/2009 (Exact Date)   SpO2 98%   BMI 21.95 kg/m²     Physical Exam  Vitals reviewed.  Exam conducted with a chaperone present. Constitutional:       General: She is not in acute distress. Appearance: Normal appearance. HENT:      Head: Normocephalic and atraumatic. Nose: Nose normal.   Eyes:      Extraocular Movements: Extraocular movements intact. Conjunctiva/sclera: Conjunctivae normal.   Cardiovascular:      Rate and Rhythm: Normal rate and regular rhythm. Heart sounds: Normal heart sounds. No murmur heard. Pulmonary:      Effort: Pulmonary effort is normal. No respiratory distress. Breath sounds: Normal breath sounds. Abdominal:      General: Abdomen is flat. Bowel sounds are normal.      Palpations: Abdomen is soft. Neurological:      Mental Status: She is alert. Mental status is at baseline.        Brain Mullen MD

## 2023-11-07 ENCOUNTER — TELEPHONE (OUTPATIENT)
Dept: FAMILY MEDICINE CLINIC | Facility: CLINIC | Age: 63
End: 2023-11-07

## 2023-11-07 DIAGNOSIS — R13.10 DYSPHAGIA, UNSPECIFIED TYPE: Primary | ICD-10-CM

## 2023-11-07 NOTE — TELEPHONE ENCOUNTER
Patient's caregiver received a call regarding the referral for speech therapy dated 11/2/23. Script needs to be rewritten to order a PT evaluation first, for referral to speech therapy.

## 2023-11-14 ENCOUNTER — HOSPITAL ENCOUNTER (OUTPATIENT)
Dept: RADIOLOGY | Facility: HOSPITAL | Age: 63
Discharge: HOME | End: 2023-11-14
Payer: MEDICARE

## 2023-11-14 DIAGNOSIS — R13.10 DYSPHAGIA, UNSPECIFIED TYPE: ICD-10-CM

## 2023-11-14 PROCEDURE — 74230 X-RAY XM SWLNG FUNCJ C+: CPT

## 2023-11-14 PROCEDURE — 92611 MOTION FLUOROSCOPY/SWALLOW: CPT

## 2023-11-14 NOTE — PROCEDURES
Video Swallow Study      Patient Name: Jr GRAY Date: 11/14/2023        Past Medical History  Past Medical History:   Diagnosis Date    Adjustment disorder     Altered mental status 12/11/2015    Anemia     Bipolar 1 disorder (HCC)     Cerebral palsy (HCC)     Chronic hypernatremia 02/06/2016    Closed fracture of left hip ([address]) 01/19/2016    Closed left hip fracture (HCC)     no surgery    Constipation     Dehydration 02/20/2016    Developmental delay     Diabetes mellitus ([address])     Difficulty walking     Disease of thyroid gland     Diverticulosis     Dysphagia     Worsening    Fracture of multiple ribs of right side     Hip fracture ([address]) 07/26/2015    left    Hyperlipidemia     Hypernatremia 12/28/2018    Hypotension     Impulse control disorder     Incontinence     Intellectual disability due to developmental disorder, unspecified     Microalbuminuria     Neuropathy in diabetes ([address])     Osteopathia     Osteoporosis     Peripheral neuropathy     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HCC)     Thrombocytopenia ([address]) 07/31/2015        Past Surgical History  Past Surgical History:   Procedure Laterality Date    ABDOMINAL SURGERY      COLECTOMY MIN      re-anastomosis 7/22/16    COLON SURGERY  1/31/16    COLONOSCOPY N/A 07/21/2016    Procedure: COLONOSCOPY;  Surgeon: Mayra Easton MD;  Location: [facility]; Service:     COLONOSCOPY N/A 01/31/2016    Procedure: COLONOSCOPY;  Surgeon: Mayra Easton MD;  Location: [facility];  Service:     COLONOSCOPY N/A 07/25/2017    Procedure: COLONOSCOPY;  Surgeon: Mayra Easton MD;  Location: [facility];   Service: Colorectal    COLOSTOMY      EXPLORATORY LAPAROTOMY W/ BOWEL RESECTION N/A 01/31/2016    Procedure: exploratory laparotomy, left sigmoidectomy, coloproctostomy, take down splenic flexure, loop colostomy;  Surgeon: Mayra Easton MD;  Location: [facility];  Service:     MD CLOSURE ENTEROSTOMY LG/SMALL INTESTINE N/A 07/22/2016    Procedure: SEGMENTAL COLECTOMY WITH COLOCOLOSTOMY;  Surgeon: Gaby Jay MD;  Location: [facility];  Service: Colorectal    UPPER GASTROINTESTINAL ENDOSCOPY           General Information:  Cindy John is a 66year old female referred to [facility] for a VBS by Dr. Kayla Santiago for dysphagia. Staff concerned with worsening dysphagia symptoms following illness and vaccines that included holding food in her mouth, more tongue thrusting, and puckering her lips. Pt was accompanied to today's study by her charge nurse, Jolene Peterson. Prior VBS  10/29/2020: Recommended to continue puree diet and HTL by tsp. Continue w/ meds crushed in puree. 02/01/2022: Pt continues to present w/ at least moderate oropharyngeal dysphagia characterized by decreased mastication abilities and decreased oral control w/ liquids. Aspiration was observed w/ HTL via teaspoon x1 before the swallow and inconsistent trace penetration w/ NTL by teaspoon. Cognition: Pt was awake/alert. No verbal expression. Speech/Swallow Mech: Limited oral motor exam: face symmetrical at rest & adequate jaw ROM. Dentition was  edentulous. Unable to elicit volitional cough. Pt is on room air. Current diet is puree with honey thick liquids by teaspoon. Pt was seen in radiology for a Video Barium Swallow Study, seated in the upright position and viewed laterally with the following consistencies: pudding (dense), puree (thinned Varibar pudding), and honey thick liquids.      Results are as follows:   **Images are available for review on PACS      Oral Impairment:  Lip Closure: escape from interlabial space or lateral juncture; no extension beyond vermillion border  Tongue Control During Bolus Hold: posterior escape of less than half of bolus  Bolus Preparation/Mastication: did not assess/no soft solid given  Bolus Transport/Lingual Motion: repetitive/disorganized motion  Oral Residue: residue collection on oral structures  Initiation of the Pharyngeal Swallow: bolus head in pyriforms    Pharyngeal Impairment:  Soft Palate Elevation: trace column between soft palate and pharyngeal wall  Laryngeal Elevation: reduced  Anterior Hyoid Excursion: reduced   Epiglottic Movement: complete inversion  Laryngeal Vestibular Closure: incomplete  Pharyngeal Stripping Wave: present  Pharyngeal Contraction: n/a (unable to complete AP view due to physical limitations)  Pharyngoesophageal Segment Opening: adequate  Tongue Base Retraction: adequate  Pharyngeal Residue: complete pharyngeal clearance    Esophageal Impairment:  Esophageal Clearance Upright Position: unable to complete as study was unable to be completed in AP view    Penetration/Aspiration:  Pudding: no penetration/aspiration  Puree: no penetration/aspiration  Honey Thick Liquids Reduced Head Position via Teaspoon: silent aspiration through the vocal folds prior to swallow initiation  Honey Thick Liquids Neutral Head Position via Teaspoon: high penetration that cleared the airway; no aspiration  Honey Thick Liquids Neutral Head Position via Medicine Cup: scant trial with silent aspiration through the vocal folds prior to swallow initiation (poor ability to retrieve bolus from cup)    Assessment Summary:  Pt presents with moderate to severe oral-pharyngeal dysphagia characterized by impaired lip closure, bolus control and transfer, swallow initiation, soft palate elevation, hyolaryngeal elevation, and airway entrance closure. Pt with silent aspiration prior to the swallow given HTL with reduced head position in 2/2 trials. High flash penetration in 1/2 trials given HTL via teaspoon with improved head control/position. No penetration/aspiration observed with pudding or puree. Recommendations:  1. Continue w/ current diet; puree and honey thick liquids by teaspoon  2. Continue meds crushed in applesauce   3. Oral Care 2-3x per day  4.  Swallow Precautions/Strategies: full feed, upright position with all oral intake, head neutral position

## 2023-11-16 ENCOUNTER — TELEPHONE (OUTPATIENT)
Dept: FAMILY MEDICINE CLINIC | Facility: CLINIC | Age: 63
End: 2023-11-16

## 2023-11-16 DIAGNOSIS — R13.10 DYSPHAGIA, UNSPECIFIED TYPE: Primary | ICD-10-CM

## 2023-11-16 NOTE — TELEPHONE ENCOUNTER
Jearlean Merlin from [address] [place] [address] [phone] phoned regarding patient Immanuel Salomon. A PT order was put in for the patient at her last visit,however; the order must state "Home Care PT evaluation for speech therapy". Can it be corrected for her?

## 2023-11-16 NOTE — TELEPHONE ENCOUNTER
I corrected the PT order and specified that the patient needs Home care PT for speech therapy; however, it will be up to the PT team to decide if she qualifies for home speech therapy evaluation.  Thank you

## 2023-11-22 ENCOUNTER — HOME HEALTH ADMISSION (OUTPATIENT)
Dept: HOME HEALTH SERVICES | Facility: HOME HEALTHCARE | Age: 63
End: 2023-11-22
Payer: MEDICARE

## 2023-11-29 ENCOUNTER — HOME CARE VISIT (OUTPATIENT)
Dept: HOME HEALTH SERVICES | Facility: HOME HEALTHCARE | Age: 63
End: 2023-11-29
Payer: MEDICARE

## 2023-11-29 VITALS
OXYGEN SATURATION: 98 % | WEIGHT: 97 LBS | DIASTOLIC BLOOD PRESSURE: 72 MMHG | HEART RATE: 72 BPM | SYSTOLIC BLOOD PRESSURE: 110 MMHG | BODY MASS INDEX: 21.82 KG/M2 | HEIGHT: 56 IN | TEMPERATURE: 96.2 F

## 2023-11-29 PROCEDURE — 400013 VN SOC

## 2023-11-29 PROCEDURE — G0151 HHCP-SERV OF PT,EA 15 MIN: HCPCS

## 2023-11-29 NOTE — Clinical Note
[facility] has admitted your patient to [facility] service with the following disciplines:      PT and Speech home therapy  Pt is currently at functional mobility baseline and no skilled home PT is needed. Recommend continued speech therapy home health services at this time to address dx of dysphagia. Response needed, please respond via EPIC in basket messaging concerning major drug to drug medication interaction listed below    Primary focus of home health care [address]    Patient stated goals of care to be able to swallow better and not choke, to be able to find the right texture of food that is safe and good for pt (goals per caregiver Shirley Rodriguez, since pt is uncomprehensive speech at this time)    Anticipated visit pattern to be determined by home speech therapy  See medication list - meds in home differ from AVS  Significant clinical findings  Potential barriers to goal achievement nonambulatory and incomprehensive speech  Other pertinent information is nonambulatory and performs stand pivot transfers and sit<->stand transfers needing maxAx1 from caregiver(s), dependent for all care at this time    Thank you for allowing us to participate in the care of your patient.       Yunior VALENZUELAT

## 2023-11-30 NOTE — CASE COMMUNICATION
[facility] has admitted your patient to [facility] service with the following disciplines:   PT and Speech home therapy     Pt is currently at functional mobility baseline and no skilled home PT is needed. Recommend continued speech therapy home health services at this time to address dx of dysphagia. Response needed, please respond via EPIC in basket messaging concerning major drug to drug medication interaction listed below      Primary focus of home health care [address]     Patient stated goals of care to be able to swallow better and not choke, to be able to find the right texture of food that is safe and good for pt (goals per caregiver Brittnee Meyer, since pt is uncomprehensive speech at this time)     Anticipated visit pattern to be determined by home speech therapy     See medication list - meds in home differ from AVS   Major drug to drug m edication interaction:  1) Starch (Thickening) and polyethylene glycol  2) citalopram and traZODone    Pt is currently taking  Tylenol liquid 160mg/5ml->take 10 ml po4 hours PRN, which is not located on pts EPIC medication profile list.    Potential barriers to goal achievement nonambulatory and incomprehensive speech     Other pertinent information is nonambulatory and performs stand pivot transfers and sit<->stand transfers needing ma xAx1 from caregiver(s), dependent for all care at this time     Thank you for allowing us to participate in the care of your patient.      Juan Jose VALENZUELAT

## 2023-12-01 ENCOUNTER — HOME CARE VISIT (OUTPATIENT)
Dept: HOME HEALTH SERVICES | Facility: HOME HEALTHCARE | Age: 63
End: 2023-12-01
Payer: MEDICARE

## 2023-12-01 PROCEDURE — G0153 HHCP-SVS OF S/L PATH,EA 15MN: HCPCS

## 2023-12-04 PROBLEM — Z00.00 MEDICARE ANNUAL WELLNESS VISIT, SUBSEQUENT: Status: RESOLVED | Noted: 2022-09-30 | Resolved: 2023-12-04

## 2023-12-13 ENCOUNTER — HOME CARE VISIT (OUTPATIENT)
Dept: HOME HEALTH SERVICES | Facility: HOME HEALTHCARE | Age: 63
End: 2023-12-13
Payer: MEDICARE

## 2023-12-13 PROCEDURE — G0153 HHCP-SVS OF S/L PATH,EA 15MN: HCPCS

## 2023-12-19 ENCOUNTER — OFFICE VISIT (OUTPATIENT)
Dept: FAMILY MEDICINE CLINIC | Facility: CLINIC | Age: 63
End: 2023-12-19

## 2023-12-19 VITALS
TEMPERATURE: 97.6 F | BODY MASS INDEX: 22.42 KG/M2 | OXYGEN SATURATION: 96 % | HEART RATE: 70 BPM | DIASTOLIC BLOOD PRESSURE: 66 MMHG | SYSTOLIC BLOOD PRESSURE: 90 MMHG | WEIGHT: 100 LBS

## 2023-12-19 DIAGNOSIS — M81.0 OSTEOPOROSIS, UNSPECIFIED OSTEOPOROSIS TYPE, UNSPECIFIED PATHOLOGICAL FRACTURE PRESENCE: Primary | ICD-10-CM

## 2023-12-19 DIAGNOSIS — R13.10 DYSPHAGIA, UNSPECIFIED TYPE: ICD-10-CM

## 2023-12-19 DIAGNOSIS — I95.9 HYPOTENSION, UNSPECIFIED HYPOTENSION TYPE: ICD-10-CM

## 2023-12-19 PROCEDURE — 99213 OFFICE O/P EST LOW 20 MIN: CPT | Performed by: FAMILY MEDICINE

## 2023-12-19 RX ORDER — DENOSUMAB 60 MG/ML
60 INJECTION SUBCUTANEOUS ONCE
Qty: 1 ML | Refills: 0 | Status: SHIPPED | OUTPATIENT
Start: 2023-12-19 | End: 2023-12-19

## 2023-12-19 NOTE — ASSESSMENT & PLAN NOTE
BP in office 90/66 with patient asymptomatic, currently at baseline  Not currently on any anti-hypertensives  Will continue to monitor

## 2023-12-19 NOTE — ASSESSMENT & PLAN NOTE
Margarito  Reviewed recent barium swallow study from 11/14/23-recommend to continue current diet with puree and honey thick liquids by zurdo

## 2023-12-19 NOTE — PROGRESS NOTES
Name: Terrie Alicea      : 1960      MRN: [id number]  Encounter Provider: Juan David Villar MD  Encounter Date: 2023   Encounter department: [facility]    Assessment & Plan     1. Osteoporosis, unspecified osteoporosis type, unspecified pathological fracture presence  Assessment & Plan:  Ordered Prolia injection for January with CMP to monitor for hypocalcemia    Orders:  -     Comprehensive metabolic panel; Future  -     denosumab (Prolia) 60 mg/mL; Inject 1 mL (60 mg total) under the skin once for 1 dose    2. Dysphagia, unspecified type  Assessment & Plan:  Stable  Reviewed recent barium swallow study from 23-recommend to continue current diet with puree and honey thick liquids by zurdo        3. Hypotension, unspecified hypotension type  Assessment & Plan:  BP in office 90/66 with patient asymptomatic, currently at baseline  Not currently on any anti-hypertensives  Will continue to monitor              Subjective      63 YOF presenting with her caregiver for general follow-up. We reviewed the barium swallow study that she completed previously, and it recommended continuing the same diet-puree and honey thick liquids by zurdo.   The caregiver requested to order the Prolia to the pharmacy as she will be getting the injection next month. We will also order a CMP to her check calcium levels.   Patient was seen by her ophthalmologist who recommended increasing the frequency of her eye drops to 4x daily; however, this schedule would be difficult to follow. Can continue administering with current schedule - BID.     Around 12 weeks from today, we will give her the PCV20 vaccine.       Review of Systems   Unable to perform ROS: Patient nonverbal               Current Outpatient Medications on File Prior to Visit   Medication Sig    ARIPiprazole (ABILIFY) 2 mg tablet Take 1 tablet by mouth daily at bedtime    baclofen 10 mg tablet Take 1 tablet (10 mg total) by  mouth 3 (three) times a day    benzonatate (TESSALON PERLES) 100 mg capsule TAKE 1 CAPSULE BY MOUTH EVERY 8 HOURS AS NEEDED FOR COUGH **DO NOT CRUSH**    Botox 100 units INJECT UP  UNITS IN THE MUSCLE INTO LOWER AND UPPER EXTREMITIES ONCE EVERY 3 MONTHS    calcium carbonate (OS-MORRIS) 600 MG tablet Take 1 tablet (600 mg total) by mouth 2 (two) times a day with meals    carbamide peroxide (DEBROX) 6.5 % otic solution 2 drops 2 drops in the affected ear prn x 5 days    carbidopa-levodopa (SINEMET CR)  mg TBCR per ER tablet Take 2 tablets by mouth 2 (two) times a day Take morning dose 7 Am and afternoon dose at 4 PM    cholecalciferol (VITAMIN D3) 1,000 units tablet Take 1 tablet (1,000 Units total) by mouth daily    citalopram (CeleXA) 40 mg tablet Take 1 tablet by mouth daily    DIABETIC TUSSIN  MG/5ML oral liquid TAKE 2 TEASPOONSFUL (10ML) BY MOUTH EVERY 4 HOURS AS NEEDED FOR COUGH & CONGESTION    divalproex sodium (DEPAKOTE SPRINKLE) 125 MG capsule 4 capsules 2 (two) times a day 4 caps BID    docosanol (ABREVA) 10 % Apply topically 5 (five) times a day Apply and rub in 5x a day until healed as needed for lesion    ferrous sulfate 324 (65 Fe) mg Take 1 tablet by mouth 2 (two) times a day    fexofenadine (ALLEGRA) 30 MG/5ML suspension Take 30 mL (180 mg total) by mouth daily as needed (seasonal allergies)    fluticasone (FLONASE) 50 mcg/act nasal spray 1 spray into each nostril daily as needed for rhinitis or allergies    glimepiride (AMARYL) 1 mg tablet TAKE 1 TABLET BY MOUTH DAILY WITH BREAKFAST (DX:DM TYPE 2)    hydrocortisone 1 % cream Apply topically to affected area twice daily as needed for rash    levothyroxine (SYNTHROID) 25 mcg tablet Take 1 tablet (25 mcg) by mouth every morning    LORazepam (ATIVAN) 0.5 mg tablet Take 0.5 mg by mouth daily Every day at 8pm    metFORMIN (GLUCOPHAGE) 1000 MG tablet Take 1 tablet (1,000 mg total) by mouth 2 (two) times a day with meals     neomycin-bacitracin-polymyxin b (NEOSPORIN) ointment Apply 1 application topically 2 (two) times a day as needed Apply to affected area BID PRN    nitrofurantoin (MACRODANTIN) 50 mg capsule Take 1 capsule by mouth daily at bedtime    olopatadine HCl (PATADAY) 0.2 % opth drops Administer 1 drop to both eyes as needed Instill 1 drop into affected eyes daily PRN for eye redness    omeprazole (PriLOSEC) 20 mg delayed release capsule Take 1 capsule (20 mg total) by mouth 2 (two) times a day before meals    oxybutynin (DITROPAN) 5 mg tablet  (Patient not taking: Reported on 11/2/2023)    Oxybutynin Chloride (DITROPAN XL PO) Take 5 mg by mouth 3 (three) times a day 1 tablet TID    polyethylene glycol (GLYCOLAX) 17 GM/SCOOP powder DISSOLVE 17GM (1 CAPFUL) IN 8 OZ FLUIDS AND CONSUME BY MOUTH EVERY MORNING *HOLD 1 DAY FOR LOOSE STOOLS* (CONSTIPATION)    Refresh Liquigel 1 % GEL 2 (two) times a day 1 drop Bid bilaterally    simvastatin (ZOCOR) 20 mg tablet Take 1 tablet (20 mg total) by mouth daily with dinner at 4 pm for hyperlipidemia    Starch, Thickening, POWD Take by mouth daily Use on food and liquid as directed    traZODone (DESYREL) 100 mg tablet Take 1 tablet (100 mg total) by mouth daily at  8 pm for depression    Vitamins A & D (VITAMIN A & D) ointment Apply topically as needed for skin irritation    Zoster Vac Recomb Adjuvanted (Shingrix) 50 MCG/0.5ML SUSR 0.5mL IM for one dose, followed by 0.5mL IM 2-6 months after first dose    [DISCONTINUED] Denosumab (PROLIA SC) Inject under the skin       Objective     BP 90/66 (BP Location: Left arm, Patient Position: Sitting, Cuff Size: Standard)   Pulse 70   Temp 97.6 °F (36.4 °C) (Temporal)   Wt 45.4 kg (100 lb) Comment: Per caregiver  LMP 11/29/2009 (Exact Date)   SpO2 96%   BMI 22.42 kg/m²     Physical Exam  Vitals reviewed.   Constitutional:       General: She is not in acute distress.     Appearance: Normal appearance. She is not ill-appearing.   HENT:      Head:  Normocephalic and atraumatic.      Nose: Nose normal.   Eyes:      Extraocular Movements: Extraocular movements intact.      Conjunctiva/sclera: Conjunctivae normal.   Cardiovascular:      Rate and Rhythm: Normal rate and regular rhythm.      Heart sounds: Normal heart sounds. No murmur heard.  Pulmonary:      Effort: Pulmonary effort is normal. No respiratory distress.      Breath sounds: Normal breath sounds. No wheezing or rales.   Abdominal:      General: Abdomen is flat. Bowel sounds are normal.      Palpations: Abdomen is soft.   Musculoskeletal:      Comments: Wheelchair bound   Neurological:      Mental Status: She is alert. Mental status is at baseline.       Juan David Villar MD

## 2023-12-20 ENCOUNTER — APPOINTMENT (OUTPATIENT)
Dept: LAB | Facility: CLINIC | Age: 63
End: 2023-12-20
Payer: MEDICARE

## 2023-12-20 ENCOUNTER — HOSPITAL ENCOUNTER (OUTPATIENT)
Dept: RADIOLOGY | Age: 63
Discharge: HOME | End: 2023-12-20
Payer: MEDICARE

## 2023-12-20 VITALS — BODY MASS INDEX: 22.5 KG/M2 | WEIGHT: 100 LBS | HEIGHT: 56 IN

## 2023-12-20 DIAGNOSIS — Z12.31 SCREENING MAMMOGRAM FOR BREAST CANCER: ICD-10-CM

## 2023-12-20 DIAGNOSIS — M81.0 OSTEOPOROSIS, UNSPECIFIED OSTEOPOROSIS TYPE, UNSPECIFIED PATHOLOGICAL FRACTURE PRESENCE: ICD-10-CM

## 2023-12-20 DIAGNOSIS — F31.9 BIPOLAR AFFECTIVE DISORDER, REMISSION STATUS UNSPECIFIED (HCC): ICD-10-CM

## 2023-12-20 DIAGNOSIS — E11.00 TYPE 2 DIABETES MELLITUS WITH HYPEROSMOLARITY WITHOUT COMA, WITHOUT LONG-TERM CURRENT USE OF INSULIN (HCC): ICD-10-CM

## 2023-12-20 DIAGNOSIS — M81.0 OSTEOPOROSIS WITHOUT CURRENT PATHOLOGICAL FRACTURE, UNSPECIFIED OSTEOPOROSIS TYPE: ICD-10-CM

## 2023-12-20 LAB
25(OH)D3 SERPL-MCNC: 95.5 NG/ML (ref 30–100)
ALBUMIN SERPL BCP-MCNC: 3.9 G/DL (ref 3.5–5)
ALP SERPL-CCNC: 35 U/L (ref 34–104)
ALT SERPL W P-5'-P-CCNC: 9 U/L (ref 7–52)
ANION GAP SERPL CALCULATED.3IONS-SCNC: 9 MMOL/L
AST SERPL W P-5'-P-CCNC: 17 U/L (ref 13–39)
BILIRUB SERPL-MCNC: 0.24 MG/DL (ref 0.2–1)
BUN SERPL-MCNC: 19 MG/DL (ref 5–25)
CALCIUM SERPL-MCNC: 9.9 MG/DL (ref 8.4–10.2)
CHLORIDE SERPL-SCNC: 100 MMOL/L (ref 96–108)
CK SERPL-CCNC: 26 U/L (ref 26–192)
CO2 SERPL-SCNC: 35 MMOL/L (ref 21–32)
CREAT SERPL-MCNC: 0.93 MG/DL (ref 0.6–1.3)
CREAT UR-MCNC: 63.6 MG/DL
EST. AVERAGE GLUCOSE BLD GHB EST-MCNC: 143 MG/DL
GFR SERPL CREATININE-BSD FRML MDRD: 66 ML/MIN/1.73SQ M
GLUCOSE P FAST SERPL-MCNC: 207 MG/DL (ref 65–99)
HBA1C MFR BLD: 6.6 %
MICROALBUMIN UR-MCNC: 8.9 MG/L
MICROALBUMIN/CREAT 24H UR: 14 MG/G CREATININE (ref 0–30)
POTASSIUM SERPL-SCNC: 4.7 MMOL/L (ref 3.5–5.3)
PROT SERPL-MCNC: 6.8 G/DL (ref 6.4–8.4)
SODIUM SERPL-SCNC: 144 MMOL/L (ref 135–147)

## 2023-12-20 PROCEDURE — 82550 ASSAY OF CK (CPK): CPT

## 2023-12-20 PROCEDURE — 82570 ASSAY OF URINE CREATININE: CPT

## 2023-12-20 PROCEDURE — 82306 VITAMIN D 25 HYDROXY: CPT

## 2023-12-20 PROCEDURE — 83036 HEMOGLOBIN GLYCOSYLATED A1C: CPT

## 2023-12-20 PROCEDURE — 77063 BREAST TOMOSYNTHESIS BI: CPT

## 2023-12-20 PROCEDURE — 80053 COMPREHEN METABOLIC PANEL: CPT

## 2023-12-20 PROCEDURE — 36415 COLL VENOUS BLD VENIPUNCTURE: CPT

## 2023-12-20 PROCEDURE — 82043 UR ALBUMIN QUANTITATIVE: CPT

## 2023-12-20 PROCEDURE — 77067 SCR MAMMO BI INCL CAD: CPT

## 2024-01-02 ENCOUNTER — TELEPHONE (OUTPATIENT)
Dept: FAMILY MEDICINE CLINIC | Facility: CLINIC | Age: 64
End: 2024-01-02

## 2024-01-02 ENCOUNTER — CLINICAL SUPPORT (OUTPATIENT)
Dept: FAMILY MEDICINE CLINIC | Facility: CLINIC | Age: 64
End: 2024-01-02

## 2024-01-02 DIAGNOSIS — M81.0 OSTEOPOROSIS, UNSPECIFIED OSTEOPOROSIS TYPE, UNSPECIFIED PATHOLOGICAL FRACTURE PRESENCE: Primary | ICD-10-CM

## 2024-01-02 PROCEDURE — 96372 THER/PROPH/DIAG INJ SC/IM: CPT

## 2024-01-02 NOTE — PROGRESS NOTES
Patient presented to office for a nurse visit to receive the Prolia Injection. Last injection was given 6/30/2023. Patient had lab work completed, levels within normal limits. Patient supplied Prolia injection. Lot number [id number]. Expiration date 5/31/2024. Patient given the injection subcutaneously in the Right Arm. Patient tolerated well. Advised to return in 6 months for the next injection.

## 2024-01-02 NOTE — TELEPHONE ENCOUNTER
Patient presented to office for a nurse visit to receive the Prolia Injection. Last injection was given 6/30/2023. Patient had lab work completed, levels within normal limits. Patient supplied Prolia injection. Lot number [id number]. Expiration date 5/31/2024. Patient given the injection subcutaneously in the Right Arm. Patient tolerated well. Advised to return in 6 months for the next injection.

## 2024-01-18 ENCOUNTER — TRANSCRIBE ORDERS (OUTPATIENT)
Dept: ADMINISTRATIVE | Age: 64
End: 2024-01-18

## 2024-01-18 ENCOUNTER — APPOINTMENT (OUTPATIENT)
Dept: LAB | Age: 64
End: 2024-01-18
Payer: MEDICARE

## 2024-01-18 DIAGNOSIS — M81.0 SENILE OSTEOPOROSIS: Primary | ICD-10-CM

## 2024-01-18 DIAGNOSIS — M81.0 SENILE OSTEOPOROSIS: ICD-10-CM

## 2024-01-18 PROCEDURE — 80053 COMPREHEN METABOLIC PANEL: CPT

## 2024-01-18 PROCEDURE — 36415 COLL VENOUS BLD VENIPUNCTURE: CPT

## 2024-01-19 LAB
ALBUMIN SERPL BCP-MCNC: 3.8 G/DL (ref 3.5–5)
ALP SERPL-CCNC: 40 U/L (ref 34–104)
ALT SERPL W P-5'-P-CCNC: 5 U/L (ref 7–52)
ANION GAP SERPL CALCULATED.3IONS-SCNC: 11 MMOL/L
AST SERPL W P-5'-P-CCNC: 12 U/L (ref 13–39)
BILIRUB SERPL-MCNC: 0.17 MG/DL (ref 0.2–1)
BUN SERPL-MCNC: 13 MG/DL (ref 5–25)
CALCIUM SERPL-MCNC: 10.1 MG/DL (ref 8.4–10.2)
CHLORIDE SERPL-SCNC: 101 MMOL/L (ref 96–108)
CO2 SERPL-SCNC: 32 MMOL/L (ref 21–32)
CREAT SERPL-MCNC: 0.77 MG/DL (ref 0.6–1.3)
GFR SERPL CREATININE-BSD FRML MDRD: 83 ML/MIN/1.73SQ M
GLUCOSE SERPL-MCNC: 69 MG/DL (ref 65–140)
POTASSIUM SERPL-SCNC: 4.8 MMOL/L (ref 3.5–5.3)
PROT SERPL-MCNC: 6 G/DL (ref 6.4–8.4)
SODIUM SERPL-SCNC: 144 MMOL/L (ref 135–147)

## 2024-01-23 ENCOUNTER — OFFICE VISIT (OUTPATIENT)
Dept: NEUROLOGY | Facility: CLINIC | Age: 64
End: 2024-01-23
Payer: MEDICARE

## 2024-01-23 VITALS — DIASTOLIC BLOOD PRESSURE: 76 MMHG | TEMPERATURE: 97.6 F | SYSTOLIC BLOOD PRESSURE: 118 MMHG

## 2024-01-23 DIAGNOSIS — G80.3 DYSTONIC CEREBRAL PALSY (HCC): Primary | ICD-10-CM

## 2024-01-23 PROCEDURE — 99213 OFFICE O/P EST LOW 20 MIN: CPT | Performed by: PHYSICAL MEDICINE & REHABILITATION

## 2024-01-23 NOTE — PROGRESS NOTES
Physical Medicine & Rehabilitation Follow-up Note  Terrie Alicea 63 y.o. female    ASSESSMENT/PLAN:     Diagnoses and all orders for this visit:    Dystonic cerebral palsy (HCC)      rupa Alicea is a 60yo F  cerebral palsy with toe walking, bipolar disorder/mood disturbance with impulse control, now with Parkinsonism (possibly secondary in part to psychiatric meds), and some EPS (tardive dyskinesia it looks like). Would classify her as dystonic cerebral palsy vs. mixed pattern CP.     She is here for just a surveillance of her function and dystonic movements. At this point, she is actually doing quite well. Her staff understand that walking is not a realistic goal, but she is happy, likes her custom wheelchair. The features of her wheelchair keep her safe, and help with the sequelae of her tone. They are able to access her axilla elbow, palms, and groin for hygiene.     She is markedly more interactive, engaged, and doesn't indicate pain to her caregivers. They are able to transfer her easily.     She does have some cervical dystonia with tendency to R laterocaput and torticollis (looking to the L). However, her caregivers are hesitant to pursue botulinum toxin for this as she is able to get her head midline, they stretch her regularly, she doesn't have pain, and they do not want to further risk her swallowing function. She is current on pureed/HTL.      Would continue Baclofen 10mg TID. At this point, will plan for surveillance visit in 6 months or as needed.    *I have spent 25 minutes with Patient and long term caregiver today in which greater than 50% of this time was spent in counseling/coordination of care regarding Risks and benefits of tx options, Instructions for management, Impressions, Counseling / Coordination of care, Documenting in the medical record, and Obtaining or reviewing history  .    HPI/SUBJECTIVE:  Here with long term caregiver, Lisa.    She is doing well overall. I last saw her in August  2023. Since then she has been seen by Neurology for her wrist issues seems to have resolved, and Neuro felt it was likely related to an injury she sustained as a result of her dystonia. She had COVID in the beginning of September, her flu and her shingles shot. In the fall they noticed worsening dysphagia and her PCP ordered an updated VBSS and Home SLP. Where she was recommended to continue Pureed/HTL. She has since stabilized, and her caregiver feels she is better than she has seen her in a long time - more vocal, more alert. Her depakote has been adjusted to 3 caps twice daily, and they feel she is doing much better with this change.     She does tend to tilt her head to the R and rotate to the L, but they are able to easily get her to midline and they work on getting her to turn her head to the R. They are hesitant to pursue toxin due to risk of dysphagia.     They have no other new concerns at this time.     Imaging: I have personally reviewed imaging with results as follows:  8/25/2023:  Multilevel cervical degenerative disc disease as detailed with diffuse disc osteophyte complexes at the C4-5 through the C6-7 levels. Superimposed central disc protrusion at the C4-5 level causing moderate central canal narrowing and focal cord   impingement. There is no cord signal abnormality to suggest myelomalacia or edema. Moderate to severe right C4-5, right C5-6, and left C6-7 neural foraminal narrowing.    ROS: A 10 point review of systems was negative except for what is noted in the HPI.    Function:   Current Level of Function:   Unchanged. maxA with all function,   Sometimes up to 2 person assist  Primary wheelchair mobility, dependent for propulsion.  Transfers with assist, with occasional attempts to stand pivots with assistance.   Engaged, enjoys coloring. Can verbalize minimally with staff.      OBJECTIVE:   /76 (BP Location: Left arm, Patient Position: Sitting, Cuff Size: Standard)   Temp 97.6 °F (36.4  °C)   LMP 11/29/2009 (Exact Date)     Gen: No acute distress  HEENT: Moist mucus membranes, Normocephalic/Atraumatic  Cardiovascular: Distal pulses palpable  Heme/Extr: No edema  Pulmonary: Non-labored breathing.   : No boateng  GI: Soft, non-tender, non-distended.   MSK:  Able to lift arms in the air, wrists in neutral, tends to elbow flexion, shoulder adduction.   Tightness in her lumbricals. Able to access groin for hygiene, palm, axilla, and elbow.   Integumentary: Skin is warm, dry.   Neuro:Awake, alert, aphasic, follows simple commands to raise her arms up, hold my hands. Feet tend to plantarflexion. Adductor bolster allows her legs to be , but has a tendency to internal rotation and adduction. Tardive dyskinesia type movements of her mouth      The following portions of the patient's history were reviewed and updated as appropriate: allergies, current medications, past family history, past medical history, past social history, past surgical history and problem list.      Current Outpatient Medications:     ARIPiprazole (ABILIFY) 2 mg tablet, Take 1 tablet by mouth daily at bedtime, Disp: , Rfl:     baclofen 10 mg tablet, Take 1 tablet (10 mg total) by mouth 3 (three) times a day, Disp: 90 tablet, Rfl: 11    benzonatate (TESSALON PERLES) 100 mg capsule, TAKE 1 CAPSULE BY MOUTH EVERY 8 HOURS AS NEEDED FOR COUGH **DO NOT CRUSH**, Disp: 5 capsule, Rfl: 11    Botox 100 units, INJECT UP  UNITS IN THE MUSCLE INTO LOWER AND UPPER EXTREMITIES ONCE EVERY 3 MONTHS, Disp: 3 each, Rfl: 0    calcium carbonate (OS-MORRIS) 600 MG tablet, Take 1 tablet (600 mg total) by mouth 2 (two) times a day with meals, Disp: 60 tablet, Rfl: 2    carbamide peroxide (DEBROX) 6.5 % otic solution, 2 drops 2 drops in the affected ear prn x 5 days, Disp: , Rfl:     carbidopa-levodopa (SINEMET CR)  mg TBCR per ER tablet, Take 2 tablets by mouth 2 (two) times a day Take morning dose 7 Am and afternoon dose at 4 PM, Disp: 360  tablet, Rfl: 3    cholecalciferol (VITAMIN D3) 1,000 units tablet, Take 1 tablet (1,000 Units total) by mouth daily, Disp: 90 tablet, Rfl: 3    citalopram (CeleXA) 40 mg tablet, Take 1 tablet by mouth daily, Disp: , Rfl:     DIABETIC TUSSIN  MG/5ML oral liquid, TAKE 2 TEASPOONSFUL (10ML) BY MOUTH EVERY 4 HOURS AS NEEDED FOR COUGH & CONGESTION, Disp: 118 mL, Rfl: 11    divalproex sodium (DEPAKOTE SPRINKLE) 125 MG capsule, 3 capsules 2 (two) times a day 4 caps BID, Disp: , Rfl:     docosanol (ABREVA) 10 %, Apply topically 5 (five) times a day Apply and rub in 5x a day until healed as needed for lesion, Disp: , Rfl: 0    ferrous sulfate 324 (65 Fe) mg, Take 1 tablet by mouth 2 (two) times a day, Disp: , Rfl:     fexofenadine (ALLEGRA) 30 MG/5ML suspension, Take 30 mL (180 mg total) by mouth daily as needed (seasonal allergies), Disp: 237 mL, Rfl: 3    fluticasone (FLONASE) 50 mcg/act nasal spray, 1 spray into each nostril daily as needed for rhinitis or allergies, Disp: 1 Bottle, Rfl: 1    glimepiride (AMARYL) 1 mg tablet, TAKE 1 TABLET BY MOUTH DAILY WITH BREAKFAST (DX:DM TYPE 2), Disp: 28 tablet, Rfl: 10    hydrocortisone 1 % cream, Apply topically to affected area twice daily as needed for rash, Disp: 30 g, Rfl: 0    levothyroxine (SYNTHROID) 25 mcg tablet, Take 1 tablet (25 mcg) by mouth every morning, Disp: , Rfl: 0    LORazepam (ATIVAN) 0.5 mg tablet, Take 0.5 mg by mouth daily Every day at 8pm, Disp: , Rfl:     metFORMIN (GLUCOPHAGE) 1000 MG tablet, Take 1 tablet (1,000 mg total) by mouth 2 (two) times a day with meals, Disp: 60 tablet, Rfl: 2    neomycin-bacitracin-polymyxin b (NEOSPORIN) ointment, Apply 1 application topically 2 (two) times a day as needed Apply to affected area BID PRN, Disp: , Rfl:     nitrofurantoin (MACRODANTIN) 50 mg capsule, Take 1 capsule by mouth daily at bedtime, Disp: , Rfl:     olopatadine HCl (PATADAY) 0.2 % opth drops, Administer 1 drop to both eyes as needed Instill 1 drop  into affected eyes daily PRN for eye redness, Disp: , Rfl:     omeprazole (PriLOSEC) 20 mg delayed release capsule, Take 1 capsule (20 mg total) by mouth 2 (two) times a day before meals, Disp: 60 capsule, Rfl: 2    Oxybutynin Chloride (DITROPAN XL PO), Take 5 mg by mouth 3 (three) times a day 1 tablet TID, Disp: , Rfl:     polyethylene glycol (GLYCOLAX) 17 GM/SCOOP powder, DISSOLVE 17GM (1 CAPFUL) IN 8 OZ FLUIDS AND CONSUME BY MOUTH EVERY MORNING *HOLD 1 DAY FOR LOOSE STOOLS* (CONSTIPATION), Disp: 510 g, Rfl: 10    Refresh Liquigel 1 % GEL, 2 (two) times a day 1 drop Bid bilaterally, Disp: , Rfl:     simvastatin (ZOCOR) 20 mg tablet, Take 1 tablet (20 mg total) by mouth daily with dinner at 4 pm for hyperlipidemia, Disp: , Rfl:     Starch, Thickening, POWD, Take by mouth daily Use on food and liquid as directed, Disp: 1020 g, Rfl: 3    traZODone (DESYREL) 100 mg tablet, Take 1 tablet (100 mg total) by mouth daily at  8 pm for depression, Disp: 90 tablet, Rfl: 0    Vitamins A & D (VITAMIN A & D) ointment, Apply topically as needed for skin irritation, Disp: , Rfl:     Zoster Vac Recomb Adjuvanted (Shingrix) 50 MCG/0.5ML SUSR, 0.5mL IM for one dose, followed by 0.5mL IM 2-6 months after first dose, Disp: 1 each, Rfl: 1    denosumab (Prolia) 60 mg/mL, Inject 1 mL (60 mg total) under the skin once for 1 dose, Disp: 1 mL, Rfl: 0    oxybutynin (DITROPAN) 5 mg tablet, , Disp: , Rfl:     Past Surgical History:   Procedure Laterality Date    ABDOMINAL SURGERY      COLECTOMY MIN      re-anastomosis 7/22/16    COLON SURGERY  1/31/16    COLONOSCOPY N/A 07/21/2016    Procedure: COLONOSCOPY;  Surgeon: Rosalino Jacome MD;  Location: [facility];  Service:     COLONOSCOPY N/A 01/31/2016    Procedure: COLONOSCOPY;  Surgeon: Rosalino Jacome MD;  Location[facility];  Service:     COLONOSCOPY N/A 07/25/2017    Procedure: COLONOSCOPY;  Surgeon: Rosalino Jacome MD;  Location: [facility];  Service: Colorectal    COLOSTOMY       EXPLORATORY LAPAROTOMY W/ BOWEL RESECTION N/A 01/31/2016    Procedure: exploratory laparotomy, left sigmoidectomy, coloproctostomy, take down splenic flexure, loop colostomy;  Surgeon: Rosalino Jacome MD;  Location: [facility];  Service:     [place] CLOSURE ENTEROSTOMY LG/SMALL INTESTINE N/A 07/22/2016    Procedure: SEGMENTAL COLECTOMY WITH COLOCOLOSTOMY;  Surgeon: Rosalino Jacome MD;  Location: [facility];  Service: Colorectal    UPPER GASTROINTESTINAL ENDOSCOPY         Patient Active Problem List    Diagnosis Date Noted    Developmental delay     Preop examination 08/08/2023    Cataract 06/30/2023    Internal hemorrhoids 06/20/2023    Contracture of right hand 05/12/2023    Need for shingles vaccine 03/31/2023    Viral illness 11/04/2022    Encounter for annual routine gynecological examination 10/07/2022    Dystonic cerebral palsy (HCC) 04/19/2022    Cervical dystonia 04/19/2022    Parkinsonism 01/21/2022    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HCC) 10/18/2021    Polyneuropathy associated with underlying disease ([facility]) 10/18/2021    Hypercalcemia 08/19/2021    Herpes zoster without complication 06/02/2021    Abnormal finding on lung imaging 04/06/2021    Macrocytic anemia 11/03/2020    Eosinophilic leukocytosis 09/08/2020    Underweight 09/03/2020    Dysphagia 08/01/2020    History of vitamin D deficiency 01/22/2020    History of fall 10/28/2019    Breast asymmetry 07/09/2019    Hypernatremia 12/28/2018    Severe sepsis ([facility]) 12/17/2018    Gait disturbance 06/26/2018    Osteoarthritis of both hips 06/08/2018    Severe Intellectual disability 04/19/2018    Type 2 diabetes mellitus, without long-term current use of insulin ([facility]) 04/19/2018    Abnormal albumin 09/18/2017    Peripheral neuropathy 09/18/2017    Seasonal allergies 06/29/2017    Physical deconditioning 05/09/2017    Hyperlipidemia 03/27/2017    Gastroesophageal reflux disease 03/27/2017    Mixed cerebral palsy (HCC) 03/27/2017    Spasticity 03/27/2017    Ambulatory  dysfunction 03/27/2017    Recurrent UTI (urinary tract infection) 03/27/2017    Bipolar disorder (HCC) 03/27/2017    Hypotension 04/11/2016    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/2016    Osteoporosis 01/19/2016    Resting tremor 01/19/2016    Gait abnormality 12/11/2015    Other chronic pain 07/30/2015    Postmenopausal vaginal bleeding 07/23/2015    Anxiety 07/15/2015    Menopause 09/25/2014    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/28/2014    Atopic dermatitis 06/02/2014    Dry eye 06/02/2014    Constipation 04/12/2013    Iron deficiency anemia 03/27/2013    Urinary incontinence 03/27/2013    Hypothyroidism 01/10/2013         ROS:    Review of Systems   Constitutional:  Negative for appetite change, fatigue and fever.        More Alert     HENT:  Negative for hearing loss, tinnitus, trouble swallowing and voice change.         Dysphagia has stayed the same     Eyes: Negative.  Negative for photophobia, pain and visual disturbance.   Respiratory: Negative.  Negative for shortness of breath.    Cardiovascular: Negative.  Negative for palpitations.   Gastrointestinal: Negative.  Negative for nausea and vomiting.   Endocrine: Negative.  Negative for cold intolerance.   Genitourinary: Negative.  Negative for dysuria, frequency and urgency.   Musculoskeletal:  Positive for neck stiffness. Negative for back pain, gait problem, myalgias and neck pain.        Spasticity has stayed the same   Skin: Negative.  Negative for rash.   Allergic/Immunologic: Negative.    Neurological:  Positive for tremors and weakness (Legs). Negative for dizziness, seizures, syncope, facial asymmetry, speech difficulty, light-headedness, numbness and headaches.   Hematological:  Bruises/bleeds easily (Bruise).   Psychiatric/Behavioral: Negative.  Negative for confusion, hallucinations and sleep disturbance.    All other systems reviewed and are negative.

## 2024-01-23 NOTE — PATIENT INSTRUCTIONS
Would like to continue following her every 6 months to monitor her functional status.  And as needed.  Monitor neck/head position - hesitant at this point to start any further sedating meds.   - Will continue to hold botox at this time.

## 2024-01-31 ENCOUNTER — CLINICAL SUPPORT (OUTPATIENT)
Dept: FAMILY MEDICINE CLINIC | Facility: CLINIC | Age: 64
End: 2024-01-31

## 2024-01-31 DIAGNOSIS — Z23 NEED FOR COVID-19 VACCINE: Primary | ICD-10-CM

## 2024-01-31 PROCEDURE — 91320 SARSCV2 VAC 30MCG TRS-SUC IM: CPT

## 2024-01-31 PROCEDURE — 90480 ADMN SARSCOV2 VAC 1/ONLY CMP: CPT

## 2024-02-01 ENCOUNTER — TELEPHONE (OUTPATIENT)
Dept: NEUROLOGY | Facility: CLINIC | Age: 64
End: 2024-02-01

## 2024-02-02 ENCOUNTER — APPOINTMENT (OUTPATIENT)
Dept: LAB | Facility: CLINIC | Age: 64
End: 2024-02-02
Payer: MEDICARE

## 2024-02-02 DIAGNOSIS — M81.0 SENILE OSTEOPOROSIS: ICD-10-CM

## 2024-02-02 LAB — VALPROATE SERPL-MCNC: 39 UG/ML (ref 50–100)

## 2024-02-02 PROCEDURE — 80164 ASSAY DIPROPYLACETIC ACD TOT: CPT

## 2024-02-02 PROCEDURE — 36415 COLL VENOUS BLD VENIPUNCTURE: CPT

## 2024-02-08 ENCOUNTER — OFFICE VISIT (OUTPATIENT)
Dept: NEUROLOGY | Facility: CLINIC | Age: 64
End: 2024-02-08
Payer: MEDICARE

## 2024-02-08 VITALS
BODY MASS INDEX: 22.45 KG/M2 | DIASTOLIC BLOOD PRESSURE: 82 MMHG | SYSTOLIC BLOOD PRESSURE: 118 MMHG | WEIGHT: 99.8 LBS | TEMPERATURE: 97.8 F | HEIGHT: 56 IN

## 2024-02-08 DIAGNOSIS — G80.3 DYSTONIC CEREBRAL PALSY (HCC): Primary | ICD-10-CM

## 2024-02-08 DIAGNOSIS — G20.C PARKINSONISM, UNSPECIFIED PARKINSONISM TYPE: ICD-10-CM

## 2024-02-08 PROCEDURE — 99214 OFFICE O/P EST MOD 30 MIN: CPT | Performed by: STUDENT IN AN ORGANIZED HEALTH CARE EDUCATION/TRAINING PROGRAM

## 2024-02-08 NOTE — PROGRESS NOTES
"[facility] -   Follow up appointment    Impression/Plan    Terrie Alicea is a 63 y.o. female with a PMH of intellectual disability 2/2 cerebral palsy, diabetes, and dysphagia presenting for follow up. Staff is happy with her current QOL and relative improvements on sinemet. Her neurological exam is unchanged except for oral dyskinesias. This may be secondary to her Sinemet. WE discussed that we will monitor for now and can discuss any potential medication adjustment at her next appointment with Annabelle. Plan outlined below:    #Parkinsonism:   Sinemet CR  mg 2 tabs BID.     - Monitor dyskinesias    #Dystonia cerebral palsy  - Baclofen 10 mg TID  - F/u w/ PMR regularly    - Follow up in 4 months with Annabelle    Diagnoses and all orders for this visit:    Dystonic cerebral palsy (HCC)    Parkinsonism, unspecified Parkinsonism type        Subjective    Terrie Alicea is a 63 y.o. female with a PMH of intellectual disability 2/2 cerebral palsy, diabetes, and dysphagia presenting for follow up.     For review:     She has followed with our epilepsy team since 2016 for history of seizure like episodes.  When she was seen in 10/2021  her largest concern was increased tremors, and gait changes, possible \"freezing\". Her exam did show significant spasticity. Symptoms worsening over the past few months. She was started on a trial of sinemet due to worsening parkinsonain features, she was referred to PM&R due to spasticity.  Did see PM&R for inital consult 4/2022 in which there was a question of dystonia vs spasticity, plan was for possible botox injections.         During her office visit on 5/2023 she had an episode and when she was unable to move her wrist or open her fingers further work-up was ordered. She continued to follow with PM&R for her dystonia/spasticity-receiving Botox injections and baclofen. Botox has since been discontinued due to limited benefit.     The patient was last seen " in clinic by Annabelle on 9/1/2023. Her hand symptoms were better, she had a brace that she wears on the right hand. Swelling has improved as well. She was back to using her hand with previous dexterity. She was using a new wheelchair and she seemed more comfortable.  Plan at that time was to continue her current dose of  Sinemet CR  mg 12 tabs BID and with PM&R.    Interval history:    Since last seen, the patient has been doing well. Her caretaker says that since starting the sinemet there are less tremors. She is able to move when she wants which she couldn't before. Her caretaker is very happy with her QOL. She is vocal and rarely can feed herself. She can pivot to transfer but otherwise uses a wheelchair.    Staff has noticed more oral dyskinesias lately. She has had them in the past but are more frequent during the day.    History Reviewed:   The following were reviewed and updated as appropriate: allergies, current medications, past family history, past medical history, past social history, past surgical history, and problem list    Past work up:   MRI cspine 8/2023: Multilevel cervical degenerative disc disease as detailed with diffuse disc osteophyte complexes at the C4-5 through the C6-7 levels. Superimposed central disc protrusion at the C4-5 level causing moderate central canal narrowing and focal cord   impingement. There is no cord signal abnormality to suggest myelomalacia or edema. Moderate to severe right C4-5, right C5-6, and left C6-7 neural foraminal narrowing.     MRI brain 5/2023: Study severely degraded by patient motion artifact with limited pulse sequences obtained. No mass effect or midline shift and infarct identified. Diffuse cerebral volume loss.    ROS:  Constitutional:  Negative for appetite change, fatigue and fever.   HENT: Negative.  Negative for hearing loss, tinnitus, trouble swallowing and voice change.    Eyes: Negative.  Negative for photophobia, pain and visual  "disturbance.   Respiratory: Negative.  Negative for shortness of breath.    Cardiovascular: Negative.  Negative for palpitations.   Gastrointestinal: Negative.  Negative for nausea and vomiting.   Endocrine: Negative.  Negative for cold intolerance.   Genitourinary: Negative.  Negative for dysuria, frequency and urgency.   Musculoskeletal:  Negative for back pain, gait problem, myalgias, neck pain and neck stiffness.   Skin: Negative.  Negative for rash.   Allergic/Immunologic: Negative.    Neurological: Negative.  Negative for dizziness, tremors, seizures, syncope, facial asymmetry, speech difficulty, weakness, light-headedness, numbness and headaches.   Hematological: Negative.  Does not bruise/bleed easily.   Psychiatric/Behavioral: Negative.  Negative for confusion, hallucinations and sleep disturbance.    All other systems reviewed and are negative.      Objective    /82 (BP Location: Right arm, Patient Position: Sitting, Cuff Size: Adult)   Temp 97.8 °F (36.6 °C) (Temporal)   Ht 4' 8\" (1.422 m)   Wt 45.3 kg (99 lb 12.8 oz)   LMP 11/29/2009 (Exact Date)   BMI 22.37 kg/m²      General Exam  General: well developed, no acute distress.  HEENT: mucous membranes moist, anicteric sclera.   Neck: supple, good ROM.  Extremities: no clubbing, cyanosis or edema.   Skin: no rash on visible skin.    Neurological Exam  Mental Status: awake and, alert.    Cranial Nerves: Extraocular motions intact with full versions, normal pursuits and saccades. Facial strength full and symmetric. Hearing intact to voice. Minimal verbal output.    Motor: Moves all extremities antigravity normal  strength b/l, intermittently follows commands so had difficulty assessing full strength but appears equal.     Sensory: Light touch is normal in upper and lower extremities.     Reflexes: Deep tendon reflexes are 2+ and symmetric in all four extremities.     Coordination: No tremor appreciated,  Unable to perform CARLYLE but overall mild " bradykinesia. She did have spasticity in the UE along with toritcollis.  Flexion of b/l wrists, was able to overcome with passive ROM.  Stiral posturing of the toes in b/l feet intermittently.  Oral dykinesias seen.    Gait: Patient presented in wheel chair, not ambulated due to safety concerns.       Prudencio Newell MD   [facility]  Diplomate, ABPN Neurology and Clinical Neurophysiology

## 2024-02-21 PROBLEM — R65.20 SEVERE SEPSIS (HCC): Status: RESOLVED | Noted: 2018-12-17 | Resolved: 2024-02-21

## 2024-02-21 PROBLEM — N39.0 RECURRENT UTI (URINARY TRACT INFECTION): Status: RESOLVED | Noted: 2017-03-27 | Resolved: 2024-02-21

## 2024-02-21 PROBLEM — Z01.419 ENCOUNTER FOR ANNUAL ROUTINE GYNECOLOGICAL EXAMINATION: Status: RESOLVED | Noted: 2022-10-07 | Resolved: 2024-02-21

## 2024-02-21 PROBLEM — A41.9 SEVERE SEPSIS (HCC): Status: RESOLVED | Noted: 2018-12-17 | Resolved: 2024-02-21

## 2024-02-26 ENCOUNTER — TELEPHONE (OUTPATIENT)
Dept: FAMILY MEDICINE CLINIC | Facility: CLINIC | Age: 64
End: 2024-02-26

## 2024-02-26 DIAGNOSIS — I69.398 SPASTICITY AS LATE EFFECT OF CEREBROVASCULAR ACCIDENT (CVA): ICD-10-CM

## 2024-02-26 DIAGNOSIS — R25.2 SPASTICITY AS LATE EFFECT OF CEREBROVASCULAR ACCIDENT (CVA): ICD-10-CM

## 2024-02-27 RX ORDER — BACLOFEN 10 MG/1
TABLET ORAL
Qty: 84 TABLET | Refills: 10 | Status: SHIPPED | OUTPATIENT
Start: 2024-02-27

## 2024-03-11 ENCOUNTER — RA CDI HCC (OUTPATIENT)
Dept: OTHER | Facility: HOSPITAL | Age: 64
End: 2024-03-11

## 2024-03-11 NOTE — PROGRESS NOTES
HCC coding opportunities          Chart Reviewed number of suggestions sent to Provider: 1     Patients Insurance     Medicare Insurance: [employer]          E11.42: Type 2 diabetes mellitus with diabetic polyneuropathy [E11.42]     Per ICD 10 documentation & coding guidelines, DM & Polyneuropathy have an assumed causal-effect relationship, UNLESS otherwise documented by the provider.

## 2024-03-12 ENCOUNTER — OFFICE VISIT (OUTPATIENT)
Dept: FAMILY MEDICINE CLINIC | Facility: CLINIC | Age: 64
End: 2024-03-12

## 2024-03-12 VITALS
TEMPERATURE: 97.8 F | HEIGHT: 56 IN | WEIGHT: 99.8 LBS | SYSTOLIC BLOOD PRESSURE: 95 MMHG | HEART RATE: 85 BPM | DIASTOLIC BLOOD PRESSURE: 65 MMHG | RESPIRATION RATE: 16 BRPM | OXYGEN SATURATION: 94 % | BODY MASS INDEX: 22.45 KG/M2

## 2024-03-12 DIAGNOSIS — E11.00 TYPE 2 DIABETES MELLITUS WITH HYPEROSMOLARITY WITHOUT COMA, WITHOUT LONG-TERM CURRENT USE OF INSULIN (HCC): Primary | ICD-10-CM

## 2024-03-12 DIAGNOSIS — M81.0 OSTEOPOROSIS, UNSPECIFIED OSTEOPOROSIS TYPE, UNSPECIFIED PATHOLOGICAL FRACTURE PRESENCE: ICD-10-CM

## 2024-03-12 PROCEDURE — G2211 COMPLEX E/M VISIT ADD ON: HCPCS | Performed by: FAMILY MEDICINE

## 2024-03-12 PROCEDURE — 99213 OFFICE O/P EST LOW 20 MIN: CPT | Performed by: FAMILY MEDICINE

## 2024-03-12 RX ORDER — ZOSTER VACCINE RECOMBINANT, ADJUVANTED 50 MCG/0.5
KIT INTRAMUSCULAR
Qty: 1 EACH | Refills: 1 | Status: CANCELLED | OUTPATIENT
Start: 2024-03-12

## 2024-03-12 NOTE — PROGRESS NOTES
Name: Terrie Alicea      : 1960      MRN: [id number]  Encounter Provider: Juan David Villar MD  Encounter Date: 3/12/2024   Encounter department: [facility]    Assessment & Plan     1. Type 2 diabetes mellitus with hyperosmolarity without coma, without long-term current use of insulin (HCC)  Assessment & Plan:    Lab Results   Component Value Date    HGBA1C 6.6 (H) 2023     Fasting glucose levels stable   Continue metformin 1000mg BID       2. Osteoporosis, unspecified osteoporosis type, unspecified pathological fracture presence  Assessment & Plan:  Due for repeat DEXA this year  Will order CMP prior to next Prolia injection (due in July), order at next visit              Subjective      63 YOF presenting with her caregiver for general follow-up. Overall, they have no concerns. Patient's fasting glucose levels have been stable. There was a recent viral illness going around but the patient herself did well. They would like to hold on vaccinations at this time until the viral illness has resolved. She will be due for her Shingles vaccine. Will also need to order her DEXA scan, annual labs next visit.       Review of Systems   Unable to perform ROS: Patient nonverbal           Current Outpatient Medications on File Prior to Visit   Medication Sig    ARIPiprazole (ABILIFY) 2 mg tablet Take 1 tablet by mouth daily at bedtime    baclofen 10 mg tablet TAKE 1 TABLET BY MOUTH THREE TIMES A DAY (SPASTICITY AS LATE EFFECT OF CVA)    benzonatate (TESSALON PERLES) 100 mg capsule TAKE 1 CAPSULE BY MOUTH EVERY 8 HOURS AS NEEDED FOR COUGH **DO NOT CRUSH**    calcium carbonate (OS-MORRIS) 600 MG tablet Take 1 tablet (600 mg total) by mouth 2 (two) times a day with meals    carbamide peroxide (DEBROX) 6.5 % otic solution 2 drops 2 drops in the affected ear prn x 5 days    carbidopa-levodopa (SINEMET CR)  mg TBCR per ER tablet Take 2 tablets by mouth 2 (two) times a day Take morning  dose 7 Am and afternoon dose at 4 PM    cholecalciferol (VITAMIN D3) 1,000 units tablet Take 1 tablet (1,000 Units total) by mouth daily    citalopram (CeleXA) 40 mg tablet Take 1 tablet by mouth daily    denosumab (Prolia) 60 mg/mL Inject 1 mL (60 mg total) under the skin once for 1 dose    DIABETIC TUSSIN  MG/5ML oral liquid TAKE 2 TEASPOONSFUL (10ML) BY MOUTH EVERY 4 HOURS AS NEEDED FOR COUGH & CONGESTION    divalproex sodium (DEPAKOTE SPRINKLE) 125 MG capsule 3 capsules 2 (two) times a day 4 caps BID    docosanol (ABREVA) 10 % Apply topically 5 (five) times a day Apply and rub in 5x a day until healed as needed for lesion    ferrous sulfate 324 (65 Fe) mg Take 1 tablet by mouth 2 (two) times a day    fexofenadine (ALLEGRA) 30 MG/5ML suspension Take 30 mL (180 mg total) by mouth daily as needed (seasonal allergies)    fluticasone (FLONASE) 50 mcg/act nasal spray 1 spray into each nostril daily as needed for rhinitis or allergies    glimepiride (AMARYL) 1 mg tablet TAKE 1 TABLET BY MOUTH DAILY WITH BREAKFAST (DX:DM TYPE 2)    hydrocortisone 1 % cream Apply topically to affected area twice daily as needed for rash    levothyroxine (SYNTHROID) 25 mcg tablet Take 1 tablet (25 mcg) by mouth every morning    LORazepam (ATIVAN) 0.5 mg tablet Take 0.5 mg by mouth daily Every day at 8pm    metFORMIN (GLUCOPHAGE) 1000 MG tablet Take 1 tablet (1,000 mg total) by mouth 2 (two) times a day with meals    neomycin-bacitracin-polymyxin b (NEOSPORIN) ointment Apply 1 application topically 2 (two) times a day as needed Apply to affected area BID PRN    nitrofurantoin (MACRODANTIN) 50 mg capsule Take 1 capsule by mouth daily at bedtime    olopatadine HCl (PATADAY) 0.2 % opth drops Administer 1 drop to both eyes as needed Instill 1 drop into affected eyes daily PRN for eye redness    omeprazole (PriLOSEC) 20 mg delayed release capsule Take 1 capsule (20 mg total) by mouth 2 (two) times a day before meals    Oxybutynin Chloride  "(DITROPAN XL PO) Take 5 mg by mouth 3 (three) times a day 1 tablet TID    polyethylene glycol (GLYCOLAX) 17 GM/SCOOP powder DISSOLVE 17GM (1 CAPFUL) IN 8 OZ FLUIDS AND CONSUME BY MOUTH EVERY MORNING *HOLD 1 DAY FOR LOOSE STOOLS* (CONSTIPATION)    Refresh Liquigel 1 % GEL 2 (two) times a day 1 drop Bid bilaterally    simvastatin (ZOCOR) 20 mg tablet Take 1 tablet (20 mg total) by mouth daily with dinner at 4 pm for hyperlipidemia    Starch, Thickening, POWD Take by mouth daily Use on food and liquid as directed    traZODone (DESYREL) 100 mg tablet Take 1 tablet (100 mg total) by mouth daily at  8 pm for depression    Vitamins A & D (VITAMIN A & D) ointment Apply topically as needed for skin irritation    Botox 100 units INJECT UP  UNITS IN THE MUSCLE INTO LOWER AND UPPER EXTREMITIES ONCE EVERY 3 MONTHS (Patient not taking: Reported on 2/8/2024)    Zoster Vac Recomb Adjuvanted (Shingrix) 50 MCG/0.5ML SUSR 0.5mL IM for one dose, followed by 0.5mL IM 2-6 months after first dose    [DISCONTINUED] oxybutynin (DITROPAN) 5 mg tablet  (Patient not taking: Reported on 11/2/2023)       Objective     BP 95/65   Pulse 85   Temp 97.8 °F (36.6 °C) (Temporal)   Resp 16   Ht 4' 8\" (1.422 m)   Wt 45.3 kg (99 lb 12.8 oz)   LMP 11/29/2009 (Exact Date)   SpO2 94% Comment: Patient moving and holding breath at times,  BMI 22.37 kg/m²     Physical Exam  Vitals reviewed.   Constitutional:       General: She is not in acute distress.     Appearance: Normal appearance.   HENT:      Head: Normocephalic and atraumatic.      Nose: Nose normal.   Eyes:      Extraocular Movements: Extraocular movements intact.      Conjunctiva/sclera: Conjunctivae normal.   Cardiovascular:      Rate and Rhythm: Normal rate and regular rhythm.      Heart sounds: No murmur heard.  Pulmonary:      Effort: Pulmonary effort is normal. No respiratory distress.      Breath sounds: Normal breath sounds.   Musculoskeletal:      Cervical back: Normal range of " motion.      Comments: Uses wheelchair at baseline    Neurological:      Mental Status: She is alert.       Juan David Villar MD

## 2024-03-12 NOTE — ASSESSMENT & PLAN NOTE
Lab Results   Component Value Date    HGBA1C 6.6 (H) 12/20/2023     Fasting glucose levels stable   Continue metformin 1000mg BID

## 2024-03-12 NOTE — ASSESSMENT & PLAN NOTE
Due for repeat DEXA this year  Will order CMP prior to next Prolia injection (due in July), order at next visit

## 2024-03-15 ENCOUNTER — HOSPITAL ENCOUNTER (OUTPATIENT)
Dept: RADIOLOGY | Facility: HOSPITAL | Age: 64
Discharge: HOME | End: 2024-03-15
Payer: MEDICARE

## 2024-03-15 ENCOUNTER — OFFICE VISIT (OUTPATIENT)
Dept: FAMILY MEDICINE CLINIC | Facility: CLINIC | Age: 64
End: 2024-03-15

## 2024-03-15 VITALS
RESPIRATION RATE: 22 BRPM | SYSTOLIC BLOOD PRESSURE: 98 MMHG | HEART RATE: 85 BPM | DIASTOLIC BLOOD PRESSURE: 64 MMHG | TEMPERATURE: 97.1 F | OXYGEN SATURATION: 92 %

## 2024-03-15 DIAGNOSIS — J18.9 COMMUNITY ACQUIRED PNEUMONIA, UNSPECIFIED LATERALITY: ICD-10-CM

## 2024-03-15 DIAGNOSIS — J18.9 COMMUNITY ACQUIRED PNEUMONIA, UNSPECIFIED LATERALITY: Primary | ICD-10-CM

## 2024-03-15 PROCEDURE — 71046 X-RAY EXAM CHEST 2 VIEWS: CPT

## 2024-03-15 PROCEDURE — G2211 COMPLEX E/M VISIT ADD ON: HCPCS | Performed by: FAMILY MEDICINE

## 2024-03-15 PROCEDURE — 99213 OFFICE O/P EST LOW 20 MIN: CPT | Performed by: FAMILY MEDICINE

## 2024-03-15 RX ORDER — LEVOFLOXACIN 750 MG/1
750 TABLET, FILM COATED ORAL EVERY 24 HOURS
Qty: 5 TABLET | Refills: 0 | Status: SHIPPED | OUTPATIENT
Start: 2024-03-15 | End: 2024-03-20

## 2024-03-15 NOTE — ASSESSMENT & PLAN NOTE
-pt with 2 days of cough, fatigue, ronchi on PE, she is a resident of a group home where another patient was recently diagnosed with pneumonia     Plan:  Will treat empirically for CAP given history and physical exam, will start levaquin 500mg QD x5 days  Care taker is also requesting an xray despite empiric treatment initiation, for group home records. Will order today  Return to office/ED precautions reviewed

## 2024-03-15 NOTE — PROGRESS NOTES
Assessment/Plan:    Community acquired pneumonia  -pt with 2 days of cough, fatigue, ronchi on PE, she is a resident of a group home where another patient was recently diagnosed with pneumonia     Plan:  Will treat empirically for CAP given history and physical exam, will start levaquin 500mg QD x5 days  Care taker is also requesting an xray despite empiric treatment initiation, for group home records. Will order today  Return to office/ED precautions reviewed       Diagnoses and all orders for this visit:    Community acquired pneumonia, unspecified laterality  -     levofloxacin (LEVAQUIN) 750 mg tablet; Take 1 tablet (750 mg total) by mouth every 24 hours for 5 days  -     XR chest pa & lateral; Future          Subjective:      Patient ID: Terrie Alicea is a 63 y.o. female.    Pt presents for an acute same day sick visit. Patient is a resident of a group home where a resident was recently diagnosed with pneumonia. Patient started yesterday with fatigue and wet cough. Care giver explains she slept for most of the day yesterday. She has not had a fever, but has had decreased appetite.         The following portions of the patient's history were reviewed and updated as appropriate: allergies, current medications, past family history, past medical history, past social history, past surgical history, and problem list.    Review of Systems   Constitutional:  Positive for fatigue. Negative for chills and fever.   HENT:  Negative for ear pain and sore throat.    Eyes:  Negative for pain and visual disturbance.   Respiratory:  Positive for cough. Negative for shortness of breath.    Cardiovascular:  Negative for chest pain and palpitations.   Gastrointestinal:  Negative for abdominal pain and vomiting.   Genitourinary:  Negative for dysuria and hematuria.   Musculoskeletal:  Negative for arthralgias and back pain.   Skin:  Negative for color change and rash.   Neurological:  Negative for seizures and syncope.   All  other systems reviewed and are negative.        Objective:      BP 98/64 (BP Location: Left arm, Patient Position: Sitting, Cuff Size: Standard)   Pulse 85   Temp (!) 97.1 °F (36.2 °C)   Resp 22   LMP 11/29/2009 (Exact Date)   SpO2 92%          Physical Exam  Constitutional:       General: She is not in acute distress.     Appearance: Normal appearance. She is not ill-appearing or toxic-appearing.   HENT:      Right Ear: External ear normal.      Left Ear: External ear normal.      Mouth/Throat:      Mouth: Mucous membranes are moist.      Pharynx: Oropharynx is clear.   Eyes:      General: No scleral icterus.     Conjunctiva/sclera: Conjunctivae normal.   Cardiovascular:      Rate and Rhythm: Normal rate and regular rhythm.      Heart sounds: Normal heart sounds. No murmur heard.  Pulmonary:      Effort: Pulmonary effort is normal. No respiratory distress.      Breath sounds: Rhonchi (throughout) present. No wheezing or rales.   Abdominal:      General: Bowel sounds are normal.      Palpations: Abdomen is soft.      Tenderness: There is no abdominal tenderness. There is no guarding.   Skin:     General: Skin is warm and dry.      Coloration: Skin is not jaundiced.   Neurological:      General: No focal deficit present.      Mental Status: She is alert and oriented to person, place, and time.   Psychiatric:         Mood and Affect: Mood normal.         Behavior: Behavior normal.

## 2024-03-25 DIAGNOSIS — G20.C PARKINSONISM, UNSPECIFIED PARKINSONISM TYPE: ICD-10-CM

## 2024-03-25 DIAGNOSIS — K21.9 GASTROESOPHAGEAL REFLUX DISEASE: ICD-10-CM

## 2024-03-25 DIAGNOSIS — E11.69 TYPE 2 DIABETES MELLITUS WITH OTHER SPECIFIED COMPLICATION, WITHOUT LONG-TERM CURRENT USE OF INSULIN (HCC): ICD-10-CM

## 2024-03-25 DIAGNOSIS — E11.9 TYPE 2 DIABETES MELLITUS WITHOUT COMPLICATION, WITHOUT LONG-TERM CURRENT USE OF INSULIN (HCC): ICD-10-CM

## 2024-03-25 DIAGNOSIS — N39.0 CHRONIC UTI: ICD-10-CM

## 2024-03-25 DIAGNOSIS — E03.9 HYPOTHYROIDISM, UNSPECIFIED TYPE: ICD-10-CM

## 2024-03-25 DIAGNOSIS — R32 URINARY INCONTINENCE, UNSPECIFIED TYPE: Primary | ICD-10-CM

## 2024-03-25 DIAGNOSIS — E78.5 HYPERLIPIDEMIA, UNSPECIFIED HYPERLIPIDEMIA TYPE: ICD-10-CM

## 2024-03-26 RX ORDER — OXYBUTYNIN CHLORIDE 5 MG/1
TABLET ORAL
Qty: 84 TABLET | Refills: 10 | Status: SHIPPED | OUTPATIENT
Start: 2024-03-26

## 2024-03-26 RX ORDER — GLIMEPIRIDE 1 MG/1
TABLET ORAL
Qty: 28 TABLET | Refills: 10 | Status: SHIPPED | OUTPATIENT
Start: 2024-03-26

## 2024-03-26 RX ORDER — LEVOTHYROXINE SODIUM 0.03 MG/1
TABLET ORAL
Qty: 28 TABLET | Refills: 10 | Status: SHIPPED | OUTPATIENT
Start: 2024-03-26

## 2024-03-26 RX ORDER — SIMVASTATIN 20 MG
TABLET ORAL
Qty: 28 TABLET | Refills: 10 | Status: SHIPPED | OUTPATIENT
Start: 2024-03-26

## 2024-03-26 RX ORDER — NITROFURANTOIN MACROCRYSTALS 50 MG/1
CAPSULE ORAL
Qty: 28 CAPSULE | Refills: 10 | Status: SHIPPED | OUTPATIENT
Start: 2024-03-26

## 2024-03-26 RX ORDER — CARBIDOPA AND LEVODOPA 25; 100 MG/1; MG/1
TABLET, EXTENDED RELEASE ORAL
Qty: 112 TABLET | Refills: 10 | Status: SHIPPED | OUTPATIENT
Start: 2024-03-26

## 2024-03-26 RX ORDER — OMEPRAZOLE 20 MG/1
CAPSULE, DELAYED RELEASE ORAL
Qty: 56 CAPSULE | Refills: 10 | Status: SHIPPED | OUTPATIENT
Start: 2024-03-26

## 2024-04-17 ENCOUNTER — CLINICAL SUPPORT (OUTPATIENT)
Dept: FAMILY MEDICINE CLINIC | Facility: CLINIC | Age: 64
End: 2024-04-17

## 2024-04-17 DIAGNOSIS — Z23 ENCOUNTER FOR IMMUNIZATION: Primary | ICD-10-CM

## 2024-04-17 PROCEDURE — 90750 HZV VACC RECOMBINANT IM: CPT

## 2024-04-17 PROCEDURE — 90471 IMMUNIZATION ADMIN: CPT

## 2024-05-01 PROBLEM — J18.9 COMMUNITY ACQUIRED PNEUMONIA: Status: RESOLVED | Noted: 2018-12-28 | Resolved: 2024-05-01

## 2024-05-10 ENCOUNTER — APPOINTMENT (EMERGENCY)
Dept: RADIOLOGY | Facility: HOSPITAL | Age: 64
DRG: 871 | End: 2024-05-10
Payer: MEDICARE

## 2024-05-10 ENCOUNTER — HOSPITAL ENCOUNTER (INPATIENT)
Facility: HOSPITAL | Age: 64
LOS: 5 days | Discharge: HOME | DRG: 871 | End: 2024-05-15
Attending: EMERGENCY MEDICINE | Admitting: FAMILY MEDICINE
Payer: MEDICARE

## 2024-05-10 DIAGNOSIS — F41.9 ANXIETY: ICD-10-CM

## 2024-05-10 DIAGNOSIS — R13.10 DYSPHAGIA, UNSPECIFIED TYPE: ICD-10-CM

## 2024-05-10 DIAGNOSIS — G93.40 ACUTE ENCEPHALOPATHY: ICD-10-CM

## 2024-05-10 DIAGNOSIS — R65.20 SEVERE SEPSIS (HCC): Primary | ICD-10-CM

## 2024-05-10 DIAGNOSIS — G82.50 SPASTIC QUADRIPARESIS (HCC): ICD-10-CM

## 2024-05-10 DIAGNOSIS — J18.9 PNEUMONIA: ICD-10-CM

## 2024-05-10 DIAGNOSIS — N17.9 AKI (ACUTE KIDNEY INJURY) (HCC): ICD-10-CM

## 2024-05-10 DIAGNOSIS — A41.9 SEVERE SEPSIS (HCC): Primary | ICD-10-CM

## 2024-05-10 DIAGNOSIS — R63.6 UNDERWEIGHT: ICD-10-CM

## 2024-05-10 DIAGNOSIS — R62.50 DEVELOPMENTAL DELAY: ICD-10-CM

## 2024-05-10 DIAGNOSIS — N39.0 UTI (URINARY TRACT INFECTION): ICD-10-CM

## 2024-05-10 PROBLEM — A41.50 SEPSIS DUE TO GRAM-NEGATIVE UTI  (HCC): Status: ACTIVE | Noted: 2024-05-10

## 2024-05-10 LAB
2HR DELTA HS TROPONIN: -1 NG/L
4HR DELTA HS TROPONIN: -1 NG/L
ALBUMIN SERPL BCP-MCNC: 3.6 G/DL (ref 3.5–5)
ALP SERPL-CCNC: 32 U/L (ref 34–104)
ALT SERPL W P-5'-P-CCNC: 3 U/L (ref 7–52)
ANION GAP SERPL CALCULATED.3IONS-SCNC: 13 MMOL/L (ref 4–13)
APTT PPP: 21 SECONDS (ref 23–37)
AST SERPL W P-5'-P-CCNC: 20 U/L (ref 13–39)
BACTERIA UR QL AUTO: ABNORMAL /HPF
BASOPHILS # BLD AUTO: 0.03 THOUSANDS/ÂΜL (ref 0–0.1)
BASOPHILS NFR BLD AUTO: 0 % (ref 0–1)
BILIRUB SERPL-MCNC: 0.29 MG/DL (ref 0.2–1)
BILIRUB UR QL STRIP: NEGATIVE
BUN SERPL-MCNC: 25 MG/DL (ref 5–25)
CALCIUM SERPL-MCNC: 9.5 MG/DL (ref 8.4–10.2)
CARDIAC TROPONIN I PNL SERPL HS: 5 NG/L
CARDIAC TROPONIN I PNL SERPL HS: 5 NG/L
CARDIAC TROPONIN I PNL SERPL HS: 6 NG/L
CHLORIDE SERPL-SCNC: 100 MMOL/L (ref 96–108)
CLARITY UR: ABNORMAL
CO2 SERPL-SCNC: 27 MMOL/L (ref 21–32)
COLOR UR: ABNORMAL
CREAT SERPL-MCNC: 1.2 MG/DL (ref 0.6–1.3)
EOSINOPHIL # BLD AUTO: 0 THOUSAND/ÂΜL (ref 0–0.61)
EOSINOPHIL NFR BLD AUTO: 0 % (ref 0–6)
ERYTHROCYTE [DISTWIDTH] IN BLOOD BY AUTOMATED COUNT: 13.4 % (ref 11.6–15.1)
EST. AVERAGE GLUCOSE BLD GHB EST-MCNC: 134 MG/DL
FLUAV RNA RESP QL NAA+PROBE: NEGATIVE
FLUBV RNA RESP QL NAA+PROBE: NEGATIVE
GFR SERPL CREATININE-BSD FRML MDRD: 48 ML/MIN/1.73SQ M
GLUCOSE SERPL-MCNC: 208 MG/DL (ref 65–140)
GLUCOSE SERPL-MCNC: 90 MG/DL (ref 65–140)
GLUCOSE SERPL-MCNC: 99 MG/DL (ref 65–140)
GLUCOSE UR STRIP-MCNC: ABNORMAL MG/DL
HBA1C MFR BLD: 6.3 %
HCT VFR BLD AUTO: 35.2 % (ref 34.8–46.1)
HGB BLD-MCNC: 11 G/DL (ref 11.5–15.4)
HGB UR QL STRIP.AUTO: NEGATIVE
IMM GRANULOCYTES # BLD AUTO: 0.03 THOUSAND/UL (ref 0–0.2)
IMM GRANULOCYTES NFR BLD AUTO: 0 % (ref 0–2)
INR PPP: 0.87 (ref 0.84–1.19)
KETONES UR STRIP-MCNC: ABNORMAL MG/DL
LACTATE SERPL-SCNC: 3.6 MMOL/L (ref 0.5–2)
LACTATE SERPL-SCNC: 4.2 MMOL/L (ref 0.5–2)
LACTATE SERPL-SCNC: 6.1 MMOL/L (ref 0.5–2)
LACTATE SERPL-SCNC: 7.6 MMOL/L (ref 0.5–2)
LEUKOCYTE ESTERASE UR QL STRIP: ABNORMAL
LYMPHOCYTES # BLD AUTO: 2.17 THOUSANDS/ÂΜL (ref 0.6–4.47)
LYMPHOCYTES NFR BLD AUTO: 25 % (ref 14–44)
MCH RBC QN AUTO: 32.7 PG (ref 26.8–34.3)
MCHC RBC AUTO-ENTMCNC: 31.3 G/DL (ref 31.4–37.4)
MCV RBC AUTO: 105 FL (ref 82–98)
MONOCYTES # BLD AUTO: 0.93 THOUSAND/ÂΜL (ref 0.17–1.22)
MONOCYTES NFR BLD AUTO: 11 % (ref 4–12)
NEUTROPHILS # BLD AUTO: 5.69 THOUSANDS/ÂΜL (ref 1.85–7.62)
NEUTS SEG NFR BLD AUTO: 64 % (ref 43–75)
NITRITE UR QL STRIP: NEGATIVE
NON-SQ EPI CELLS URNS QL MICRO: ABNORMAL /HPF
NRBC BLD AUTO-RTO: 0 /100 WBCS
PH UR STRIP.AUTO: 6 [PH]
PLATELET # BLD AUTO: 120 THOUSANDS/UL (ref 149–390)
PLATELET # BLD AUTO: 127 THOUSANDS/UL (ref 149–390)
PLATELET BLD QL SMEAR: ABNORMAL
PMV BLD AUTO: 12.5 FL (ref 8.9–12.7)
PMV BLD AUTO: 13.1 FL (ref 8.9–12.7)
POTASSIUM SERPL-SCNC: 5.3 MMOL/L (ref 3.5–5.3)
PROCALCITONIN SERPL-MCNC: 0.06 NG/ML
PROCALCITONIN SERPL-MCNC: <0.05 NG/ML
PROT SERPL-MCNC: 6.9 G/DL (ref 6.4–8.4)
PROT UR STRIP-MCNC: NEGATIVE MG/DL
PROTHROMBIN TIME: 12.3 SECONDS (ref 11.6–14.5)
RBC # BLD AUTO: 3.36 MILLION/UL (ref 3.81–5.12)
RBC #/AREA URNS AUTO: ABNORMAL /HPF
RBC MORPH BLD: NORMAL
RSV RNA RESP QL NAA+PROBE: NEGATIVE
SARS-COV-2 RNA RESP QL NAA+PROBE: NEGATIVE
SODIUM SERPL-SCNC: 140 MMOL/L (ref 135–147)
SP GR UR STRIP.AUTO: 1.01 (ref 1–1.03)
UROBILINOGEN UR QL STRIP.AUTO: 0.2 E.U./DL
WBC # BLD AUTO: 8.85 THOUSAND/UL (ref 4.31–10.16)
WBC #/AREA URNS AUTO: ABNORMAL /HPF

## 2024-05-10 PROCEDURE — 96366 THER/PROPH/DIAG IV INF ADDON: CPT

## 2024-05-10 PROCEDURE — 87154 CUL TYP ID BLD PTHGN 6+ TRGT: CPT | Performed by: EMERGENCY MEDICINE

## 2024-05-10 PROCEDURE — 81001 URINALYSIS AUTO W/SCOPE: CPT | Performed by: EMERGENCY MEDICINE

## 2024-05-10 PROCEDURE — 83036 HEMOGLOBIN GLYCOSYLATED A1C: CPT | Performed by: HOSPITALIST

## 2024-05-10 PROCEDURE — 96368 THER/DIAG CONCURRENT INF: CPT

## 2024-05-10 PROCEDURE — 87040 BLOOD CULTURE FOR BACTERIA: CPT | Performed by: EMERGENCY MEDICINE

## 2024-05-10 PROCEDURE — 96365 THER/PROPH/DIAG IV INF INIT: CPT

## 2024-05-10 PROCEDURE — 84484 ASSAY OF TROPONIN QUANT: CPT | Performed by: HOSPITALIST

## 2024-05-10 PROCEDURE — 83605 ASSAY OF LACTIC ACID: CPT | Performed by: HOSPITALIST

## 2024-05-10 PROCEDURE — 84484 ASSAY OF TROPONIN QUANT: CPT | Performed by: EMERGENCY MEDICINE

## 2024-05-10 PROCEDURE — 83605 ASSAY OF LACTIC ACID: CPT | Performed by: EMERGENCY MEDICINE

## 2024-05-10 PROCEDURE — 85025 COMPLETE CBC W/AUTO DIFF WBC: CPT | Performed by: EMERGENCY MEDICINE

## 2024-05-10 PROCEDURE — 84145 PROCALCITONIN (PCT): CPT | Performed by: EMERGENCY MEDICINE

## 2024-05-10 PROCEDURE — 87077 CULTURE AEROBIC IDENTIFY: CPT | Performed by: EMERGENCY MEDICINE

## 2024-05-10 PROCEDURE — 93005 ELECTROCARDIOGRAM TRACING: CPT

## 2024-05-10 PROCEDURE — 99233 SBSQ HOSP IP/OBS HIGH 50: CPT | Performed by: HOSPITALIST

## 2024-05-10 PROCEDURE — 92610 EVALUATE SWALLOWING FUNCTION: CPT

## 2024-05-10 PROCEDURE — 80053 COMPREHEN METABOLIC PANEL: CPT | Performed by: EMERGENCY MEDICINE

## 2024-05-10 PROCEDURE — 85610 PROTHROMBIN TIME: CPT | Performed by: EMERGENCY MEDICINE

## 2024-05-10 PROCEDURE — 36415 COLL VENOUS BLD VENIPUNCTURE: CPT | Performed by: EMERGENCY MEDICINE

## 2024-05-10 PROCEDURE — 85730 THROMBOPLASTIN TIME PARTIAL: CPT | Performed by: EMERGENCY MEDICINE

## 2024-05-10 PROCEDURE — 0241U HB NFCT DS VIR RESP RNA 4 TRGT: CPT | Performed by: EMERGENCY MEDICINE

## 2024-05-10 PROCEDURE — 87086 URINE CULTURE/COLONY COUNT: CPT | Performed by: EMERGENCY MEDICINE

## 2024-05-10 PROCEDURE — 99291 CRITICAL CARE FIRST HOUR: CPT | Performed by: EMERGENCY MEDICINE

## 2024-05-10 PROCEDURE — 99285 EMERGENCY DEPT VISIT HI MDM: CPT

## 2024-05-10 PROCEDURE — 85049 AUTOMATED PLATELET COUNT: CPT | Performed by: HOSPITALIST

## 2024-05-10 PROCEDURE — 87186 SC STD MICRODIL/AGAR DIL: CPT | Performed by: EMERGENCY MEDICINE

## 2024-05-10 PROCEDURE — 71045 X-RAY EXAM CHEST 1 VIEW: CPT

## 2024-05-10 PROCEDURE — 82948 REAGENT STRIP/BLOOD GLUCOSE: CPT

## 2024-05-10 PROCEDURE — 84145 PROCALCITONIN (PCT): CPT | Performed by: HOSPITALIST

## 2024-05-10 RX ORDER — ENOXAPARIN SODIUM 100 MG/ML
40 INJECTION SUBCUTANEOUS DAILY
Status: DISCONTINUED | OUTPATIENT
Start: 2024-05-10 | End: 2024-05-15 | Stop reason: HOSPADM

## 2024-05-10 RX ORDER — CARBIDOPA AND LEVODOPA 25; 100 MG/1; MG/1
2 TABLET, EXTENDED RELEASE ORAL 2 TIMES DAILY
Status: DISCONTINUED | OUTPATIENT
Start: 2024-05-10 | End: 2024-05-11

## 2024-05-10 RX ORDER — INSULIN LISPRO 100 [IU]/ML
1-5 INJECTION, SOLUTION INTRAVENOUS; SUBCUTANEOUS
Status: DISCONTINUED | OUTPATIENT
Start: 2024-05-10 | End: 2024-05-15 | Stop reason: HOSPADM

## 2024-05-10 RX ORDER — BISACODYL 10 MG
10 SUPPOSITORY, RECTAL RECTAL DAILY PRN
Status: DISCONTINUED | OUTPATIENT
Start: 2024-05-10 | End: 2024-05-15 | Stop reason: HOSPADM

## 2024-05-10 RX ORDER — SODIUM CHLORIDE, SODIUM GLUCONATE, SODIUM ACETATE, POTASSIUM CHLORIDE, MAGNESIUM CHLORIDE, SODIUM PHOSPHATE, DIBASIC, AND POTASSIUM PHOSPHATE .53; .5; .37; .037; .03; .012; .00082 G/100ML; G/100ML; G/100ML; G/100ML; G/100ML; G/100ML; G/100ML
1000 INJECTION, SOLUTION INTRAVENOUS ONCE
Status: COMPLETED | OUTPATIENT
Start: 2024-05-10 | End: 2024-05-10

## 2024-05-10 RX ORDER — ACETAMINOPHEN 325 MG/1
650 TABLET ORAL EVERY 6 HOURS PRN
Status: DISCONTINUED | OUTPATIENT
Start: 2024-05-10 | End: 2024-05-12

## 2024-05-10 RX ORDER — SODIUM CHLORIDE, SODIUM GLUCONATE, SODIUM ACETATE, POTASSIUM CHLORIDE, MAGNESIUM CHLORIDE, SODIUM PHOSPHATE, DIBASIC, AND POTASSIUM PHOSPHATE .53; .5; .37; .037; .03; .012; .00082 G/100ML; G/100ML; G/100ML; G/100ML; G/100ML; G/100ML; G/100ML
125 INJECTION, SOLUTION INTRAVENOUS CONTINUOUS
Status: DISCONTINUED | OUTPATIENT
Start: 2024-05-10 | End: 2024-05-11

## 2024-05-10 RX ORDER — CITALOPRAM 20 MG/1
20 TABLET ORAL DAILY
Status: DISCONTINUED | OUTPATIENT
Start: 2024-05-10 | End: 2024-05-15 | Stop reason: HOSPADM

## 2024-05-10 RX ORDER — PRAVASTATIN SODIUM 40 MG
40 TABLET ORAL
Status: DISCONTINUED | OUTPATIENT
Start: 2024-05-10 | End: 2024-05-11

## 2024-05-10 RX ORDER — LEVOTHYROXINE SODIUM 0.03 MG/1
25 TABLET ORAL
Status: DISCONTINUED | OUTPATIENT
Start: 2024-05-11 | End: 2024-05-15 | Stop reason: HOSPADM

## 2024-05-10 RX ORDER — DIVALPROEX SODIUM 125 MG/1
375 CAPSULE, COATED PELLETS ORAL 3 TIMES DAILY
Status: DISCONTINUED | OUTPATIENT
Start: 2024-05-10 | End: 2024-05-15 | Stop reason: HOSPADM

## 2024-05-10 RX ADMIN — CARBIDOPA AND LEVODOPA 2 TABLET: 25; 100 TABLET, EXTENDED RELEASE ORAL at 19:43

## 2024-05-10 RX ADMIN — ENOXAPARIN SODIUM 40 MG: 40 INJECTION SUBCUTANEOUS at 19:42

## 2024-05-10 RX ADMIN — SODIUM CHLORIDE, SODIUM GLUCONATE, SODIUM ACETATE, POTASSIUM CHLORIDE, MAGNESIUM CHLORIDE, SODIUM PHOSPHATE, DIBASIC, AND POTASSIUM PHOSPHATE 125 ML/HR: .53; .5; .37; .037; .03; .012; .00082 INJECTION, SOLUTION INTRAVENOUS at 23:08

## 2024-05-10 RX ADMIN — CEFTRIAXONE SODIUM 1000 MG: 10 INJECTION, POWDER, FOR SOLUTION INTRAVENOUS at 11:18

## 2024-05-10 RX ADMIN — DIVALPROEX SODIUM 375 MG: 125 CAPSULE, COATED PELLETS ORAL at 21:20

## 2024-05-10 RX ADMIN — SODIUM CHLORIDE, SODIUM GLUCONATE, SODIUM ACETATE, POTASSIUM CHLORIDE, MAGNESIUM CHLORIDE, SODIUM PHOSPHATE, DIBASIC, AND POTASSIUM PHOSPHATE 1000 ML: .53; .5; .37; .037; .03; .012; .00082 INJECTION, SOLUTION INTRAVENOUS at 10:03

## 2024-05-10 RX ADMIN — SODIUM CHLORIDE, SODIUM GLUCONATE, SODIUM ACETATE, POTASSIUM CHLORIDE, MAGNESIUM CHLORIDE, SODIUM PHOSPHATE, DIBASIC, AND POTASSIUM PHOSPHATE 1000 ML: .53; .5; .37; .037; .03; .012; .00082 INJECTION, SOLUTION INTRAVENOUS at 11:08

## 2024-05-10 RX ADMIN — SODIUM CHLORIDE, SODIUM GLUCONATE, SODIUM ACETATE, POTASSIUM CHLORIDE, MAGNESIUM CHLORIDE, SODIUM PHOSPHATE, DIBASIC, AND POTASSIUM PHOSPHATE 125 ML/HR: .53; .5; .37; .037; .03; .012; .00082 INJECTION, SOLUTION INTRAVENOUS at 14:22

## 2024-05-10 NOTE — DISCHARGE INSTR - DIET
Dysphagia 1 puree w/ honey thick liquids by tsp  Alternate food and liquids  Meds crushed  Aspiration precautions

## 2024-05-10 NOTE — ASSESSMENT & PLAN NOTE
-fever this .1 F just PTA (then given Tylenol)  -Lactic acid 7.6, now down to 6.1 on repeat  -U/A with 30-50 WBC  -Patient given 30 cc/kg isolate bolus, continue at 125 cc/h  -As per discussion with the patient's caregiver she has had UTIs with E. coli and Klebsiella in the past.  I have reviewed the prior urine cultures in epic.  Klebsiella and E. coli have been sensitive to cephalosporins in the past (December 2021 and August 2023).  Will continue with Rocephin started in the ER.  -Follow blood cultures and urine culture  -SCAR due to sepsis, POA, trend creatinine with IV fluids  -Acute metabolic encephalopathy due to sepsis, POA, follow mentation with treatment of underlying infection

## 2024-05-10 NOTE — ED PROVIDER NOTES
History  Chief Complaint   Patient presents with    Altered Mental Status     From a group home, found this morning to be lethargic with temp of 101 got tylenol around 0600. PH  pressures in high 80s     63-year-old female with history of intellectual disability secondary to cerebral palsy, in the ED for evaluation of fever, low blood pressure and altered mental status, brought in from her group home with her caregiver.  Baseline, the patient will say a few words, will stand pivot and use a wheelchair but does not ambulate on her own.  Overnight, it was reported that she developed a fever up to 101 Fahrenheit and was given Tylenol at 6 AM, 320 mg.  She made less urine overnight than normal in her diaper, she is incontinent of urine.  No cough reported.  This presentation is similar to prior episodes where she has had a UTI, the caregiver states. She would not eat or drink much she states. She was lethargic this morning, but better now after tylenol and the EMS ride to the ED.      History provided by:  EMS personnel and caregiver   used: No    Altered Mental Status  Presenting symptoms: lethargy        Prior to Admission Medications   Prescriptions Last Dose Informant Patient Reported? Taking?   ARIPiprazole (ABILIFY) 2 mg tablet  Care Giver Yes No   Sig: Take 1 tablet by mouth daily at bedtime   DIABETIC TUSSIN  MG/5ML oral liquid  Care Giver No No   Sig: TAKE 2 TEASPOONSFUL (10ML) BY MOUTH EVERY 4 HOURS AS NEEDED FOR COUGH & CONGESTION   LORazepam (ATIVAN) 0.5 mg tablet  Care Giver Yes No   Sig: Take 0.5 mg by mouth daily Every day at 8pm   Refresh Liquigel 1 % GEL  Care Giver Yes No   Si (two) times a day 1 drop Bid bilaterally   Starch, Thickening, POWD  Care Giver No No   Sig: Take by mouth daily Use on food and liquid as directed   Vitamins A & D (VITAMIN A & D) ointment  Care Giver Yes No   Sig: Apply topically as needed for skin irritation   Zoster Vac Recomb Adjuvanted  (Shingrix) 50 MCG/0.5ML SUSR  Care Giver No No   Si.5mL IM for one dose, followed by 0.5mL IM 2-6 months after first dose   baclofen 10 mg tablet   No No   Sig: TAKE 1 TABLET BY MOUTH THREE TIMES A DAY (SPASTICITY AS LATE EFFECT OF CVA)   benzonatate (TESSALON PERLES) 100 mg capsule  Care Giver No No   Sig: TAKE 1 CAPSULE BY MOUTH EVERY 8 HOURS AS NEEDED FOR COUGH **DO NOT CRUSH**   calcium carbonate (OS-MORRIS) 600 MG tablet  Care Giver No No   Sig: Take 1 tablet (600 mg total) by mouth 2 (two) times a day with meals   carbamide peroxide (DEBROX) 6.5 % otic solution  Care Giver Yes No   Si drops 2 drops in the affected ear prn x 5 days   carbidopa-levodopa (SINEMET CR)  mg TBCR per ER tablet   No No   Sig: TAKE 2 TABLETS BY MOUTH (50/200MG) TWICE A DAY (PARKINSONS DISEASE)   cholecalciferol (VITAMIN D3) 1,000 units tablet  Care Giver No No   Sig: Take 1 tablet (1,000 Units total) by mouth daily   citalopram (CeleXA) 40 mg tablet  Care Giver Yes No   Sig: Take 1 tablet by mouth daily   denosumab (Prolia) 60 mg/mL  Care Giver No No   Sig: Inject 1 mL (60 mg total) under the skin once for 1 dose   divalproex sodium (DEPAKOTE SPRINKLE) 125 MG capsule  Care Giver Yes No   Sig: 3 capsules 2 (two) times a day 4 caps BID   docosanol (ABREVA) 10 %  Care Giver No No   Sig: Apply topically 5 (five) times a day Apply and rub in 5x a day until healed as needed for lesion   ferrous sulfate 324 (65 Fe) mg  Care Giver Yes No   Sig: Take 1 tablet by mouth 2 (two) times a day   fexofenadine (ALLEGRA) 30 MG/5ML suspension  Care Giver No No   Sig: Take 30 mL (180 mg total) by mouth daily as needed (seasonal allergies)   fluticasone (FLONASE) 50 mcg/act nasal spray  Care Giver No No   Si spray into each nostril daily as needed for rhinitis or allergies   glimepiride (AMARYL) 1 mg tablet   No No   Sig: TAKE 1 TABLET BY MOUTH DAILY WITH BREAKFAST (DX:DM TYPE 2)   hydrocortisone 1 % cream  Care Giver No No   Sig: Apply  topically to affected area twice daily as needed for rash   levothyroxine 25 mcg tablet   No No   Sig: TAKE 1 TABLET BY MOUTH EVERY MORNING (HYPOTHYROIDISM)   metFORMIN (GLUCOPHAGE) 1000 MG tablet   No No   Sig: TAKE 1 TABLET BY MOUTH TWICE A DAY WITH MEALS (TYPE 2 DIABETES MELLITUS)   neomycin-bacitracin-polymyxin b (NEOSPORIN) ointment  Care Giver Yes No   Sig: Apply 1 application topically 2 (two) times a day as needed Apply to affected area BID PRN   nitrofurantoin (MACRODANTIN) 50 mg capsule   No No   Sig: TAKE 1 CAPSULE BY MOUTH AT BEDTIME (CHRONIC UTI)   olopatadine HCl (PATADAY) 0.2 % opth drops  Care Giver Yes No   Sig: Administer 1 drop to both eyes as needed Instill 1 drop into affected eyes daily PRN for eye redness   omeprazole (PriLOSEC) 20 mg delayed release capsule   No No   Sig: TAKE 1 CAPSULE BY MOUTH TWICE A DAY BEFORE MEALS (GERD)   oxybutynin (DITROPAN) 5 mg tablet   No No   Sig: TAKE 1 TABLET BY MOUTH THREE TIMES A DAY (URINARY INCONTINENCE)   polyethylene glycol (GLYCOLAX) 17 GM/SCOOP powder  Care Giver No No   Sig: DISSOLVE 17GM (1 CAPFUL) IN 8 OZ FLUIDS AND CONSUME BY MOUTH EVERY MORNING *HOLD 1 DAY FOR LOOSE STOOLS* (CONSTIPATION)   simvastatin (ZOCOR) 20 mg tablet   No No   Sig: TAKE 1 TABLET BY MOUTH EVERY EVENING (HYPERLIPIDEMIA)   traZODone (DESYREL) 100 mg tablet  Care Giver No No   Sig: Take 1 tablet (100 mg total) by mouth daily at  8 pm for depression      Facility-Administered Medications: None       Past Medical History:   Diagnosis Date    Adjustment disorder     Altered mental status 12/11/2015    Anemia     Bipolar 1 disorder (HCC)     Cerebral palsy (HCC)     Chronic hypernatremia 02/06/2016    Closed fracture of left hip (HCC) 01/19/2016    Closed left hip fracture (HCC)     no surgery    Constipation     Dehydration 02/20/2016    Developmental delay     Diabetes mellitus (HCC)     Difficulty walking     Disease of thyroid gland     Diverticulosis     Dysphagia     Worsening     Fracture of multiple ribs of right side     Herpes zoster without complication 06/02/2021    Hip fracture ([facility]) 07/26/2015    left    Hyperlipidemia     Hypernatremia 12/28/2018    Hypotension     Impulse control disorder     Incontinence     Intellectual disability due to developmental disorder, unspecified     Microalbuminuria     Neuropathy in diabetes ([facility])     Osteopathia     Osteoporosis     Peripheral neuropathy     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[facility])     Thrombocytopenia ([facility]) 07/31/2015       Past Surgical History:   Procedure Laterality Date    ABDOMINAL SURGERY      COLECTOMY MIN      re-anastomosis 7/22/16    COLON SURGERY  1/31/16    COLONOSCOPY N/A 07/21/2016    Procedure: COLONOSCOPY;  Surgeon: Rosalino Jacome MD;  Location: [facility];  Service:     COLONOSCOPY N/A 01/31/2016    Procedure: COLONOSCOPY;  Surgeon: Rosalino Jacome MD;  [facility];  Service:     COLONOSCOPY N/A 07/25/2017    Procedure: COLONOSCOPY;  Surgeon: Rosalino Jacome MD;  Location: [facility];  Service: Colorectal    COLOSTOMY      EXPLORATORY LAPAROTOMY W/ BOWEL RESECTION N/A 01/31/2016    Procedure: exploratory laparotomy, left sigmoidectomy, coloproctostomy, take down splenic flexure, loop colostomy;  Surgeon: Rosalino Jacome MD;  Location[facility];  Service:     KS CLOSURE ENTEROSTOMY LG/SMALL INTESTINE N/A 07/22/2016    Procedure: SEGMENTAL COLECTOMY WITH COLOCOLOSTOMY;  Surgeon: Rosalino Jacome MD;  Location[facility];  Service: Colorectal    UPPER GASTROINTESTINAL ENDOSCOPY         Family History   Problem Relation Age of Onset    Alcohol abuse Mother     Alcohol abuse Father     Diabetes Sister     No Known Problems Sister     No Known Problems Sister      I have reviewed and agree with the history as documented.    E-Cigarette/Vaping    E-Cigarette Use Never User      E-Cigarette/Vaping Substances    Nicotine No     THC No     CBD No     Flavoring No     Other No     Unknown No      Social History      Tobacco Use    Smoking status: Never    Smokeless tobacco: Never   Vaping Use    Vaping status: Never Used   Substance Use Topics    Alcohol use: Not Currently    Drug use: No       Review of Systems   Unable to perform ROS: Patient nonverbal   All other systems reviewed and are negative.      Physical Exam  Physical Exam  Vitals and nursing note reviewed.   Constitutional:       General: She is not in acute distress.     Appearance: Normal appearance. She is well-developed and normal weight. She is ill-appearing. She is not toxic-appearing or diaphoretic.   HENT:      Head: Normocephalic and atraumatic.      Right Ear: External ear normal.      Left Ear: External ear normal.      Nose: Nose normal.      Mouth/Throat:      Mouth: Mucous membranes are moist.      Pharynx: Oropharynx is clear.   Eyes:      Extraocular Movements: Extraocular movements intact.      Conjunctiva/sclera: Conjunctivae normal.      Pupils: Pupils are equal, round, and reactive to light.   Cardiovascular:      Rate and Rhythm: Normal rate and regular rhythm.      Pulses: Normal pulses.      Heart sounds: Normal heart sounds. No murmur heard.     No friction rub. No gallop.   Pulmonary:      Effort: Pulmonary effort is normal. No respiratory distress.      Breath sounds: Normal breath sounds. No stridor. No wheezing, rhonchi or rales.   Chest:      Chest wall: No tenderness.   Abdominal:      General: Abdomen is flat. There is no distension or abdominal bruit. There are no signs of injury.      Palpations: Abdomen is soft. There is no shifting dullness or mass.      Tenderness: There is no abdominal tenderness. There is no guarding.   Genitourinary:     Adnexa: Right adnexa normal and left adnexa normal.   Musculoskeletal:         General: Normal range of motion.      Cervical back: Normal range of motion and neck supple.   Skin:     General: Skin is warm and dry.      Capillary Refill: Capillary refill takes less than 2 seconds.    Neurological:      General: No focal deficit present.      Mental Status: She is alert.      GCS: GCS eye subscore is 4. GCS verbal subscore is 2. GCS motor subscore is 5.      Comments: B/l upper extremity contractures, spasticity         Vital Signs  ED Triage Vitals   Temperature Pulse Respirations Blood Pressure SpO2   05/10/24 0918 05/10/24 0913 05/10/24 0920 05/10/24 0923 05/10/24 0913   99 °F (37.2 °C) 87 20 97/55 94 %      Temp Source Heart Rate Source Patient Position - Orthostatic VS BP Location FiO2 (%)   05/10/24 0918 05/10/24 0913 05/10/24 1315 05/10/24 1315 --   Rectal Monitor Sitting Right arm       Pain Score       --                  Vitals:    05/10/24 1015 05/10/24 1030 05/10/24 1215 05/10/24 1315   BP: 96/59 93/56 112/74 103/55   Pulse: 66 70 65 67   Patient Position - Orthostatic VS:    Sitting         Visual Acuity  Visual Acuity      Flowsheet Row Most Recent Value   L Pupil Size (mm) 3   R Pupil Size (mm) 3            ED Medications  Medications   multi-electrolyte (PLASMALYTE-A/ISOLYTE-S PH 7.4) IV solution (125 mL/hr Intravenous New Bag 5/10/24 1422)   multi-electrolyte (PLASMALYTE-A/ISOLYTE-S PH 7.4) IV solution 1,000 mL (0 mL Intravenous Stopped 5/10/24 1033)     Followed by   multi-electrolyte (PLASMALYTE-A/ISOLYTE-S PH 7.4) IV solution 1,000 mL (0 mL Intravenous Stopped 5/10/24 1259)   ceftriaxone (ROCEPHIN) 1 g/50 mL in dextrose IVPB (0 mg Intravenous Stopped 5/10/24 1156)       Diagnostic Studies  Results Reviewed       Procedure Component Value Units Date/Time    Blood culture #1 [[id number]] Collected: 05/10/24 1002    Lab Status: Preliminary result Specimen: Blood from Arm, Right Updated: 05/10/24 1501     Blood Culture Received in Microbiology Lab. Culture in Progress.    Blood culture #2 [[id number]] Collected: 05/10/24 1002    Lab Status: Preliminary result Specimen: Blood from Arm, Right Updated: 05/10/24 1501     Blood Culture Received in Microbiology Lab. Culture in Progress.     Lactic acid 2 Hours [[id number]]  (Abnormal) Collected: 05/10/24 1310    Lab Status: Final result Specimen: Blood Updated: 05/10/24 1341     LACTIC ACID 6.1 mmol/L     Narrative:      Result may be elevated if tourniquet was used during collection.    HS Troponin I 2hr [[id number]]  (Normal) Collected: 05/10/24 1310    Lab Status: Final result Specimen: Blood Updated: 05/10/24 1336     hs TnI 2hr 5 ng/L      Delta 2hr hsTnI -1 ng/L     UA w Reflex to Microscopic w Reflex to Culture [[id number]]  (Abnormal) Collected: 05/10/24 1102    Lab Status: Final result Specimen: Urine, Straight Cath Updated: 05/10/24 1218     Color, UA Light Yellow     Clarity, UA Slightly Cloudy     Specific Gravity, UA 1.015     pH, UA 6.0     Leukocytes, UA Small     Nitrite, UA Negative     Protein, UA Negative mg/dl      Glucose,  (1/4%) mg/dl      Ketones, UA 15 (1+) mg/dl      Urobilinogen, UA 0.2 E.U./dl      Bilirubin, UA Negative     Occult Blood, UA Negative    Urine Microscopic [[id number]]  (Abnormal) Collected: 05/10/24 1102    Lab Status: Final result Specimen: Urine, Straight Cath Updated: 05/10/24 1211     RBC, UA 1-2 /hpf      WBC, UA 30-50 /hpf      Epithelial Cells None Seen /hpf      Bacteria, UA Moderate /hpf     Urine culture [[id number]] Collected: 05/10/24 1102    Lab Status: In process Specimen: Urine, Straight Cath Updated: 05/10/24 1211    HS Troponin I 4hr [[id number]]     Lab Status: No result Specimen: Blood     FLU/RSV/COVID - if FLU/RSV clinically relevant [[id number]]  (Normal) Collected: 05/10/24 1003    Lab Status: Final result Specimen: Nares from Nose Updated: 05/10/24 1122     SARS-CoV-2 Negative     INFLUENZA A PCR Negative     INFLUENZA B PCR Negative     RSV PCR Negative    Narrative:      FOR PEDIATRIC PATIENTS - copy/paste COVID Guidelines URL to browser: [email or web address]    SARS-CoV-2 assay is a Nucleic Acid Amplification assay intended for  the  qualitative detection of nucleic acid from SARS-CoV-2 in nasopharyngeal  swabs. Results are for the presumptive identification of SARS-CoV-2 RNA.    Positive results are indicative of infection with SARS-CoV-2, the virus  causing COVID-19, but do not rule out bacterial infection or co-infection  with other viruses. Laboratories within the [place] and its  territories are required to report all positive results to the appropriate  public health authorities. Negative results do not preclude SARS-CoV-2  infection and should not be used as the sole basis for treatment or other  patient management decisions. Negative results must be combined with  clinical observations, patient history, and epidemiological information.  This test has not been FDA cleared or approved.    This test has been authorized by FDA under an Emergency Use Authorization  (EUA). This test is only authorized for the duration of time the  declaration that circumstances exist justifying the authorization of the  emergency use of an in vitro diagnostic tests for detection of SARS-CoV-2  virus and/or diagnosis of COVID-19 infection under section 564(b)(1) of  the Act, 21 U.S.C. 360bbb-3(b)(1), unless the authorization is terminated  or revoked sooner. The test has been validated but independent review by FDA  and CLIA is pending.    Test performed using [email or web address] GeneXpert: This RT-PCR assay targets N2,  a region unique to SARS-CoV-2. A conserved region in the E-gene was chosen  for pan-Sarbecovirus detection which includes SARS-CoV-2.    According to CMS-2020-01-R, this platform meets the definition of high-throughput technology.    Lactic acid [[id number]]  (Abnormal) Collected: 05/10/24 1002    Lab Status: Final result Specimen: Blood from Arm, Right Updated: 05/10/24 1104     LACTIC ACID 7.6 mmol/L     Narrative:      Result may be elevated if tourniquet was used during collection.    CBC and differential [[id number]]  (Abnormal) Collected:  05/10/24 1002    Lab Status: Final result Specimen: Blood from Arm, Right Updated: 05/10/24 1059     WBC 8.85 Thousand/uL      RBC 3.36 Million/uL      Hemoglobin 11.0 g/dL      Hematocrit 35.2 %       fL      MCH 32.7 pg      MCHC 31.3 g/dL      RDW 13.4 %      MPV 13.1 fL      Platelets 127 Thousands/uL      nRBC 0 /100 WBCs      Segmented % 64 %      Immature Grans % 0 %      Lymphocytes % 25 %      Monocytes % 11 %      Eosinophils Relative 0 %      Basophils Relative 0 %      Absolute Neutrophils 5.69 Thousands/µL      Absolute Immature Grans 0.03 Thousand/uL      Absolute Lymphocytes 2.17 Thousands/µL      Absolute Monocytes 0.93 Thousand/µL      Eosinophils Absolute 0.00 Thousand/µL      Basophils Absolute 0.03 Thousands/µL     Narrative:      This is an appended report.  These results have been appended to a previously verified report.    Smear Review(Phlebs Do Not Order) [[id number]]  (Abnormal) Collected: 05/10/24 1002    Lab Status: Final result Specimen: Blood from Arm, Right Updated: 05/10/24 1059     RBC Morphology Normal     Platelet Estimate Borderline    Procalcitonin [[id number]]  (Normal) Collected: 05/10/24 1002    Lab Status: Final result Specimen: Blood from Arm, Right Updated: 05/10/24 1052     Procalcitonin <0.05 ng/ml     HS Troponin 0hr (reflex protocol) [[id number]]  (Normal) Collected: 05/10/24 1002    Lab Status: Final result Specimen: Blood from Arm, Right Updated: 05/10/24 1049     hs TnI 0hr 6 ng/L     Protime-INR [[id number]]  (Normal) Collected: 05/10/24 1002    Lab Status: Final result Specimen: Blood from Arm, Right Updated: 05/10/24 1048     Protime 12.3 seconds      INR 0.87    APTT [[id number]]  (Abnormal) Collected: 05/10/24 1002    Lab Status: Final result Specimen: Blood from Arm, Right Updated: 05/10/24 1048     PTT 21 seconds     Comprehensive metabolic panel [[id number]]  (Abnormal) Collected: 05/10/24 1002    Lab Status: Final result Specimen: Blood from Arm, Right  Updated: 05/10/24 1044     Sodium 140 mmol/L      Potassium 5.3 mmol/L      Chloride 100 mmol/L      CO2 27 mmol/L      ANION GAP 13 mmol/L      BUN 25 mg/dL      Creatinine 1.20 mg/dL      Glucose 208 mg/dL      Calcium 9.5 mg/dL      AST 20 U/L      ALT 3 U/L      Alkaline Phosphatase 32 U/L      Total Protein 6.9 g/dL      Albumin 3.6 g/dL      Total Bilirubin 0.29 mg/dL      eGFR 48 ml/min/1.73sq m     Narrative:      National Kidney Disease Foundation guidelines for Chronic Kidney Disease (CKD):     Stage 1 with normal or high GFR (GFR > 90 mL/min/1.73 square meters)    Stage 2 Mild CKD (GFR = 60-89 mL/min/1.73 square meters)    Stage 3A Moderate CKD (GFR = 45-59 mL/min/1.73 square meters)    Stage 3B Moderate CKD (GFR = 30-44 mL/min/1.73 square meters)    Stage 4 Severe CKD (GFR = 15-29 mL/min/1.73 square meters)    Stage 5 End Stage CKD (GFR <15 mL/min/1.73 square meters)  Note: GFR calculation is accurate only with a steady state creatinine                   XR chest portable    (Results Pending)              Procedures  CriticalCare Time    Date/Time: 5/10/2024 3:29 PM    Performed by: Peyman George DO  Authorized by: Peyman George DO    Critical care provider statement:     Critical care time (minutes):  40    Critical care time was exclusive of:  Separately billable procedures and treating other patients and teaching time    Critical care was necessary to treat or prevent imminent or life-threatening deterioration of the following conditions:  Sepsis and dehydration    Critical care was time spent personally by me on the following activities:  Obtaining history from patient or surrogate, development of treatment plan with patient or surrogate, discussions with primary provider, evaluation of patient's response to treatment, examination of patient, review of old charts, re-evaluation of patient's condition, ordering and review of radiographic studies, ordering and review of laboratory studies and  ordering and performing treatments and interventions    I assumed direction of critical care for this patient from another provider in my specialty: no    ECG 12 Lead Documentation Only    Date/Time: 5/10/2024 9:19 AM    Performed by: Peyman George DO  Authorized by: Peyman George DO    Indications / Diagnosis:  Sepsis  ECG reviewed by me, the ED Provider: yes    Patient location:  ED  Previous ECG:     Previous ECG:  Compared to current    Similarity:  No change    Comparison to cardiac monitor: Yes    Interpretation:     Interpretation: normal    Rate:     ECG rate:  77    ECG rate assessment: normal    Rhythm:     Rhythm: sinus rhythm    Ectopy:     Ectopy: none    QRS:     QRS axis:  Normal    QRS intervals:  Normal  Conduction:     Conduction: normal    ST segments:     ST segments:  Normal  T waves:     T waves: normal             ED Course  ED Course as of 05/10/24 1532   Fri May 10, 2024   1000 Had 320 mg tylenol at 6am.   1247 WBC, UA(!): 30-50   1247 LACTIC ACID(!!): 7.6   1247 Platelet Count(!): 127   1247 Creatinine: 1.20  Severe sepsis 2/2 UTI. IV fluids, ceftriaxone given.  Case d/w SL[facility] Dr Conn, accepts to service inpt.                                             Medical Decision Making  64 yo F in the ED for fever, hypotension, AMS.  Ddx including sepsis, UTI, pneumonia, covid flu.   Workup - c/w acute UTI, severe sepsis. Admit to IM, stable for med surg    Amount and/or Complexity of Data Reviewed  Labs: ordered. Decision-making details documented in ED Course.  Radiology: ordered and independent interpretation performed. Decision-making details documented in ED Course.    Risk  Prescription drug management.  Decision regarding hospitalization.             Disposition  Final diagnoses:   SCAR (acute kidney injury) (HCC)   UTI (urinary tract infection)   Severe sepsis (HCC)   Acute encephalopathy     Time reflects when diagnosis was documented in both MDM as applicable and the Disposition  within this note       Time User Action Codes Description Comment    5/10/2024 12:47 PM Dichter, Peyman Add [N17.9] SCAR (acute kidney injury) ([facility])     5/10/2024 12:47 PM Dichter, Peyman Add [N39.0] UTI (urinary tract infection)     5/10/2024 12:47 PM Dichter, Peyman Add [A41.9,  R65.20] Severe sepsis ([facility])     5/10/2024 12:47 PM Dichter, Peyman Modify [N17.9] SCAR (acute kidney injury) ([facility])     5/10/2024 12:47 PM Dichter, Peyman Modify [A41.9,  R65.20] Severe sepsis ([facility])     5/10/2024 12:47 PM Dichter, Peyman Add [G93.40] Acute encephalopathy     5/10/2024  2:06 PM Geoffrey Danielle Add [R63.6] Underweight           ED Disposition       ED Disposition   Admit    Condition   Stable    Date/Time   Fri May 10, 2024 12:47 PM    Comment   Case was discussed with Dr Conn and the patient's admission status was agreed to be Admission Status: inpatient status to the service of Dr. Conn .               Follow-up Information    None         Patient's Medications   Discharge Prescriptions    No medications on file       No discharge procedures on file.    PDMP Review         Value Time User    PDMP Reviewed  Yes 9/1/2020  3:35 PM Edward Pickering MD            ED Provider  Electronically Signed by             Peyman George DO  05/10/24 1534

## 2024-05-10 NOTE — SPEECH THERAPY NOTE
Speech-Language Pathology Bedside Swallow Evaluation        Patient Name: Terrie Alicea    Today's Date: 5/10/2024     Problem List  Active Problems:    Gastroesophageal reflux disease    Severe Intellectual disability    Type 2 diabetes mellitus, without long-term current use of insulin ([facility])    Acute metabolic encephalopathy    Hypothyroidism    Lactic acidosis    Sepsis due to gram-negative UTI  ([facility])    Acute kidney injury ([facility])         Past Medical History  Past Medical History:   Diagnosis Date    Adjustment disorder     Altered mental status 12/11/2015    Anemia     Bipolar 1 disorder ([facility])     Cerebral palsy ([facility])     Chronic hypernatremia 02/06/2016    Closed fracture of left hip ([facility]) 01/19/2016    Closed left hip fracture ([facility])     no surgery    Constipation     Dehydration 02/20/2016    Developmental delay     Diabetes mellitus ([facility])     Difficulty walking     Disease of thyroid gland     Diverticulosis     Dysphagia     Worsening    Fracture of multiple ribs of right side     Herpes zoster without complication 06/02/2021    Hip fracture ([facility]) 07/26/2015    left    Hyperlipidemia     Hypernatremia 12/28/2018    Hypotension     Impulse control disorder     Incontinence     Intellectual disability due to developmental disorder, unspecified     Microalbuminuria     Neuropathy in diabetes ([facility])     Osteopathia     Osteoporosis     Peripheral neuropathy     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 Pt has been on puree and HTL by tsp since 2020.      Recommendations:   Diet: puree/level 1 diet and honey thick liquids by tsp  Meds: crushed with puree   Frequent Oral care: 4x/day  Aspiration precautions and compensatory swallowing strategies: upright posture, only feed when fully alert, slow rate of feeding, liquids by teaspoon only, quiet environment (tv off, limit talking, door closed,  etc.), and alternating bites and sips  Other Recommendations/ considerations: will follow up x 1-3 as needed for diet tolerance        Current Medical Status  Pt is a 63 y.o. female who presented to [facility]  with UTI, sepsis. Pt presents with a chief complaint of change in mental status/lethargy.  Patient was in her usual state of health yesterday.  This morning the patient was minimally responsive and curled up in a ball.  She was hypotensive and her temperature was 101.1 °F tympanic.  The patient was nonverbal at that time..  Of note at baseline the patient can say a few words or grunts.  Patient currently is nonverbal and the history was obtained from the patient's caregiver at bedside.  Patient's caregiver reports that there is no diarrhea or vomiting.  No other signs/symptoms reported by the caregiver.     Pt well known to dept from previous VBS's and hospitalizations.     Past medical history:   Please see H&P for details    Special Studies:  CXR pending    VBS 11/14/23 Pt presents with moderate to severe oral-pharyngeal dysphagia characterized by impaired lip closure, bolus control and transfer, swallow initiation, soft palate elevation, hyolaryngeal elevation, and airway entrance closure. Pt with silent aspiration prior to the swallow given HTL with reduced head position in 2/2 trials. High flash penetration in 1/2 trials given HTL via teaspoon with improved head control/position. No penetration/aspiration observed with pudding or puree.     Social/Education/Vocational Hx:  Pt lives in group home    Swallow Information   Prior speech/swallowing tx: multiple VBS's completed  Current Risks for Dysphagia & Aspiration: known history of dysphagia, known history of aspiration, and AMS  Current Symptoms/Concerns:  VBS  Current Diet: NPO   Baseline Diet: puree/level 1 diet and honey thick liquids  Takes pills- crushed    Baseline Assessment   Behavior/Cognition: alert  Speech/Language Status: able to  follow commands inconsistently and limited verbal output  Patient Positioning: upright in bed (stretcher)     Swallow Mechanism Exam   Facial: symmetrical  Labial: unable to test 2/2 limited command following  Lingual: unable to test 2/2 limited command following  Velum: unable to visualize  Mandible: adequate ROM  Dentition: edentulous  Vocal quality:clear/adequate   Volitional Cough: unable to initiate volitional cough   Respiratory: NC    Consistencies Assessed and Performance   Consistencies Administered: honey thick and puree    Oral Stage: pt w/ fair bolus retrieval, needed encouragement to take po; labial leakage noted. Pt at times only took small amts from spoon, closing mouth too soon before bolus placed. Fair labial stripping from spoon. Oral holding at times, needing verbal &/or tactile cues to transfer and swallow. Other times transfer was prompt. No oral residue or pocketing noted.     Pharyngeal Stage: swallow initiation was delayed w/ complete laryngeal excursion. No coughing noted. Swallows audible at times.       Esophageal Concerns: none reported      Results Reviewed with: patient, RN, MD, and caregiver    Dysphagia Goals: pt will tolerate puree with honey thick liquids by tsp without s/s of aspiration x1-3 sessions      Sade Hirsch MA CCC-SLP  Speech Pathologist  [place] license # [id number]  [place] license # [id number]  Available via [employer]

## 2024-05-10 NOTE — PROGRESS NOTES
"Critical access hospital  Progress Note  Name: Terrie Alicea I  MRN: [id number]  Unit/Bed#: ED-35 I Date of Admission: 5/10/2024   Date of Service: 5/10/2024 I Hospital Day: 0    Assessment/Plan   Acute kidney injury (HCC)  Assessment & Plan  -Sepsis plan    Sepsis due to gram-negative UTI  (HCC)  Assessment & Plan  -fever this .1 F just PTA (then given Tylenol)  -Lactic acid 7.6, now down to 6.1 on repeat  -U/A with 30-50 WBC  -Patient given 30 cc/kg isolate bolus, continue at 125 cc/h  -As per discussion with the patient's caregiver she has had UTIs with E. coli and Klebsiella in the past.  I have reviewed the prior urine cultures in epic.  Klebsiella and E. coli have been sensitive to cephalosporins in the past (December 2021 and August 2023).  Will continue with Rocephin started in the ER.  -Follow blood cultures and urine culture  -SCAR due to sepsis, POA, trend creatinine with IV fluids  -Acute metabolic encephalopathy due to sepsis, POA, follow mentation with treatment of underlying infection    Lactic acidosis  Assessment & Plan  -See sepsis plan    Hypothyroidism  Assessment & Plan  -Continue Levoxyl    Acute metabolic encephalopathy  Assessment & Plan  -See sepsis plan    Type 2 diabetes mellitus, without long-term current use of insulin ([facility])  Assessment & Plan  Lab Results   Component Value Date    HGBA1C 6.6 (H) 12/20/2023       No results for input(s): \"POCGLU\" in the last 72 hours.    Blood Sugar Average: Last 72 hrs:    -SSI/accuchecks    Severe Intellectual disability  Assessment & Plan  -As per caregiver patient usually can say a few words at a time.  Other times she just grunts.    Gastroesophageal reflux disease  Assessment & Plan  -Continue PPI         VTE Pharmacologic Prophylaxis: VTE Score: 6 Lovenox  Code Status: Full  Discussion with family: Caregiver at bedside    Anticipated Length of Stay: Patient will be admitted on an inpatient basis with an anticipated length of " stay of greater than 2 midnights secondary to sepsis due to urinary tract infection.    Total Time Spent on Date of Encounter in care of patient: 75 mins. This time was spent on one or more of the following: performing physical exam; counseling and coordination of care; obtaining or reviewing history; documenting in the medical record; reviewing/ordering tests, medications or procedures; communicating with other healthcare professionals and discussing with patient's family/caregivers.    Chief Complaint: Change in mental status, lethargy    History of Present Illness:  Terrie Alicea is a 63 y.o. female who presents with a chief complaint of change in mental status/lethargy.  Patient was in her usual state of health yesterday.  This morning the patient was minimally responsive and curled up in a ball.  She was hypotensive and her temperature was 101.1 °F tympanic.  The patient was nonverbal at that time..  Of note at baseline the patient can say a few words or grunts.  Patient currently is nonverbal and the history was obtained from the patient's caregiver at bedside.  Patient's caregiver reports that there is no diarrhea or vomiting.  No other signs/symptoms reported by the caregiver.    Review of Systems:  Review of Systems   Unable to perform ROS: Acuity of condition   Patient also minimally verbal at baseline.  Currently nonverbal due to acuity of illness.    Past Medical and Surgical History:   Past Medical History:   Diagnosis Date    Adjustment disorder     Altered mental status 12/11/2015    Anemia     Bipolar 1 disorder ([facility])     Cerebral palsy ([facility])     Chronic hypernatremia 02/06/2016    Closed fracture of left hip (HCC) 01/19/2016    Closed left hip fracture (HCC)     no surgery    Constipation     Dehydration 02/20/2016    Developmental delay     Diabetes mellitus (HCC)     Difficulty walking     Disease of thyroid gland     Diverticulosis     Dysphagia     Worsening    Fracture of multiple ribs of  right side     Herpes zoster without complication 06/02/2021    Hip fracture (HCC) 07/26/2015    left    Hyperlipidemia     Hypernatremia 12/28/2018    Hypotension     Impulse control disorder     Incontinence     Intellectual disability due to developmental disorder, unspecified     Microalbuminuria     Neuropathy in diabetes ([facility])     Osteopathia     Osteoporosis     Peripheral neuropathy     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HCC)     Thrombocytopenia ([facility]) 07/31/2015       Past Surgical History:   Procedure Laterality Date    ABDOMINAL SURGERY      COLECTOMY MIN      re-anastomosis 7/22/16    COLON SURGERY  1/31/16    COLONOSCOPY N/A 07/21/2016    Procedure: COLONOSCOPY;  Surgeon: Rosalino Jacome MD;  Location: [facility];  Service:     COLONOSCOPY N/A 01/31/2016    Procedure: COLONOSCOPY;  Surgeon: Rosalino Jacome MD;  [facility];  Service:     COLONOSCOPY N/A 07/25/2017    Procedure: COLONOSCOPY;  Surgeon: Rosalino Jacome MD;  Location: [facility];  Service: Colorectal    COLOSTOMY      EXPLORATORY LAPAROTOMY W/ BOWEL RESECTION N/A 01/31/2016    Procedure: exploratory laparotomy, left sigmoidectomy, coloproctostomy, take down splenic flexure, loop colostomy;  Surgeon: Rosalino Jacome MD;  Location: [facility];  Service:     [place] CLOSURE ENTEROSTOMY LG/SMALL INTESTINE N/A 07/22/2016    Procedure: SEGMENTAL COLECTOMY WITH COLOCOLOSTOMY;  Surgeon: Rosalino Jacome MD;  Location: [facility];  Service: Colorectal    UPPER GASTROINTESTINAL ENDOSCOPY         Meds/Allergies:  Prior to Admission medications    Medication Sig Start Date End Date Taking? Authorizing Provider   ARIPiprazole (ABILIFY) 2 mg tablet Take 1 tablet by mouth daily at bedtime 6/8/22   Historical Provider, MD   baclofen 10 mg tablet TAKE 1 TABLET BY MOUTH THREE TIMES A DAY (SPASTICITY AS LATE EFFECT OF CVA) 2/27/24   Juan David Villar MD   benzonatate (TESSALON PERLES) 100 mg capsule TAKE 1 CAPSULE BY MOUTH EVERY 8 HOURS AS NEEDED FOR COUGH **DO NOT  CRUSH** 3/13/23   Juan David Villar MD   calcium carbonate (OS-MORRIS) 600 MG tablet Take 1 tablet (600 mg total) by mouth 2 (two) times a day with meals 6/11/20   Chely Ny MD   carbamide peroxide (DEBROX) 6.5 % otic solution 2 drops 2 drops in the affected ear prn x 5 days    Historical Provider, MD   carbidopa-levodopa (SINEMET CR)  mg TBCR per ER tablet TAKE 2 TABLETS BY MOUTH (50/200MG) TWICE A DAY (PARKINSONS DISEASE) 3/26/24   Juan David Villar MD   cholecalciferol (VITAMIN D3) 1,000 units tablet Take 1 tablet (1,000 Units total) by mouth daily 7/1/23   Juan David Villar MD   citalopram (CeleXA) 40 mg tablet Take 1 tablet by mouth daily 9/28/22   Historical Provider, MD   denosumab (Prolia) 60 mg/mL Inject 1 mL (60 mg total) under the skin once for 1 dose 12/19/23 3/12/24  Juan David Villar MD   DIABETIC TUSSIN  MG/5ML oral liquid TAKE 2 TEASPOONSFUL (10ML) BY MOUTH EVERY 4 HOURS AS NEEDED FOR COUGH & CONGESTION 11/18/22   Inderjit Sheppard DO   divalproex sodium (DEPAKOTE SPRINKLE) 125 MG capsule 3 capsules 2 (two) times a day 4 caps BID 4/11/22   Historical Provider, MD   docosanol (ABREVA) 10 % Apply topically 5 (five) times a day Apply and rub in 5x a day until healed as needed for lesion 10/2/18   Usha Florence MD   ferrous sulfate 324 (65 Fe) mg Take 1 tablet by mouth 2 (two) times a day    Historical Provider, MD   fexofenadine (ALLEGRA) 30 MG/5ML suspension Take 30 mL (180 mg total) by mouth daily as needed (seasonal allergies) 9/30/22   Juan David Villar MD   fluticasone (FLONASE) 50 mcg/act nasal spray 1 spray into each nostril daily as needed for rhinitis or allergies 4/7/20   Chely Ny MD   glimepiride (AMARYL) 1 mg tablet TAKE 1 TABLET BY MOUTH DAILY WITH BREAKFAST (DX:DM TYPE 2) 3/26/24   Juan David Villar MD   hydrocortisone 1 % cream Apply topically to affected area twice daily as needed for rash 6/11/20   Chely Ny MD   levothyroxine 25 mcg tablet TAKE 1 TABLET BY MOUTH EVERY MORNING (HYPOTHYROIDISM) 3/26/24   Juan David  MD Aurea   LORazepam (ATIVAN) 0.5 mg tablet Take 0.5 mg by mouth daily Every day at 8pm    Historical Provider, MD   metFORMIN (GLUCOPHAGE) 1000 MG tablet TAKE 1 TABLET BY MOUTH TWICE A DAY WITH MEALS (TYPE 2 DIABETES MELLITUS) 3/26/24   Juan David Villar MD   neomycin-bacitracin-polymyxin b (NEOSPORIN) ointment Apply 1 application topically 2 (two) times a day as needed Apply to affected area BID PRN    Historical Provider, MD   nitrofurantoin (MACRODANTIN) 50 mg capsule TAKE 1 CAPSULE BY MOUTH AT BEDTIME (CHRONIC UTI) 3/26/24   Juan David Villar MD   olopatadine HCl (PATADAY) 0.2 % opth drops Administer 1 drop to both eyes as needed Instill 1 drop into affected eyes daily PRN for eye redness    Historical Provider, MD   omeprazole (PriLOSEC) 20 mg delayed release capsule TAKE 1 CAPSULE BY MOUTH TWICE A DAY BEFORE MEALS (GERD) 3/26/24   Juan David Villar MD   oxybutynin (DITROPAN) 5 mg tablet TAKE 1 TABLET BY MOUTH THREE TIMES A DAY (URINARY INCONTINENCE) 3/26/24   Juan David Villar MD   polyethylene glycol (GLYCOLAX) 17 GM/SCOOP powder DISSOLVE 17GM (1 CAPFUL) IN 8 OZ FLUIDS AND CONSUME BY MOUTH EVERY MORNING *HOLD 1 DAY FOR LOOSE STOOLS* (CONSTIPATION) 12/4/20   Sofie Frank DO   Refresh Liquigel 1 % GEL 2 (two) times a day 1 drop Bid bilaterally 8/13/23   Historical Provider, MD   simvastatin (ZOCOR) 20 mg tablet TAKE 1 TABLET BY MOUTH EVERY EVENING (HYPERLIPIDEMIA) 3/26/24   Juan David Villar MD   Starch, Thickening, POWD Take by mouth daily Use on food and liquid as directed 11/17/20   Sofie Frank DO   traZODone (DESYREL) 100 mg tablet Take 1 tablet (100 mg total) by mouth daily at  8 pm for depression 10/6/22   Juan David Villar MD   Vitamins A & D (VITAMIN A & D) ointment Apply topically as needed for skin irritation    Historical Provider, MD   Zoster Vac Recomb Adjuvanted (Shingrix) 50 MCG/0.5ML SUSR 0.5mL IM for one dose, followed by 0.5mL IM 2-6 months after first dose 10/5/23   Juan David Villar MD   Botox 100 units INJECT UP  UNITS IN THE MUSCLE INTO LOWER  AND UPPER EXTREMITIES ONCE EVERY 3 MONTHS  Patient not taking: Reported on 2/8/2024 3/30/23 5/10/24  Ashley Depadua, MD     Medications reviewed with caretaker.    Allergies:   Allergies   Allergen Reactions    Keflex [Cephalexin] GI Intolerance       Social History:  Marital Status: Single   Substance Use History:   Social History     Substance and Sexual Activity   Alcohol Use Not Currently     Social History     Tobacco Use   Smoking Status Never   Smokeless Tobacco Never     Social History     Substance and Sexual Activity   Drug Use No       Family History:  Family History   Problem Relation Age of Onset    Alcohol abuse Mother     Alcohol abuse Father     Diabetes Sister     No Known Problems Sister     No Known Problems Sister        Physical Exam:     Vitals:   Blood Pressure: 103/55 (05/10/24 1315)  Pulse: 67 (05/10/24 1315)  Temperature: 99 °F (37.2 °C) (05/10/24 0918)  Temp Source: Rectal (05/10/24 0918)  Respirations: 18 (05/10/24 1315)  Weight - Scale: 46.2 kg (101 lb 13.6 oz) (05/10/24 0913)  SpO2: 95 % (05/10/24 1315)    Physical Exam   Gen: NAD, appears chronically ill malnourished, NCAT  Eyes: EOMI, PERRLA, no scleral icterus  ENMT:  no nasal discharge, no otic discharge, dry mucous membranes  Neck:  Supple  Cardiovascular:  Regular rate and rhythm, normal S1-S2, no murmurs, rubs, or gallops  Lungs:  Clear to auscultation bilaterally, no wheezes, or rales, or rhonchi  Abdomen:  Positive bowel sounds, soft, nontender, nondistended, no palpable organomegaly   Skin:  Intact, no obvious lesions or rashes, no edema  Neuro: Moves head to voice, cranial nerves 2-12 are intact, unable to participate in the rest of the neurological examination.      Additional Data:     Lab Results:  Results from last 7 days   Lab Units 05/10/24  1002   WBC Thousand/uL 8.85   HEMOGLOBIN g/dL 11.0*   HEMATOCRIT % 35.2   PLATELETS Thousands/uL 127*   SEGS PCT % 64   LYMPHO PCT % 25   MONO PCT % 11   EOS PCT % 0     Results  from last 7 days   Lab Units 05/10/24  1002   SODIUM mmol/L 140   POTASSIUM mmol/L 5.3   CHLORIDE mmol/L 100   CO2 mmol/L 27   BUN mg/dL 25   CREATININE mg/dL 1.20   ANION GAP mmol/L 13   CALCIUM mg/dL 9.5   ALBUMIN g/dL 3.6   TOTAL BILIRUBIN mg/dL 0.29   ALK PHOS U/L 32*   ALT U/L 3*   AST U/L 20   GLUCOSE RANDOM mg/dL 208*     Results from last 7 days   Lab Units 05/10/24  1002   INR  0.87             Results from last 7 days   Lab Units 05/10/24  1310 05/10/24  1002   LACTIC ACID mmol/L 6.1* 7.6*   PROCALCITONIN ng/ml  --  <0.05       Lines/Drains:  Invasive Devices       Peripheral Intravenous Line  Duration             Peripheral IV 05/10/24 Left Hand <1 day    Peripheral IV 05/10/24 Right Antecubital <1 day                        Imaging: Personally reviewed the following imaging: chest xray  XR chest portable    (Results Pending)   No acute disease of the chest on my read      ** Please Note: This note has been constructed using a voice recognition system. **

## 2024-05-10 NOTE — LETTER
[facility] SURGICAL  [address]  [place] [id number]  Dept: [phone]    May 15, 2024     Patient: Terrie Alicea   YOB: 1960   Date of Visit: 5/10/2024       To Whom it May Concern:    Terrie Alicea is under my professional care. She was seen in the hospital from 5/10/2024 to 05/15/24. She  may return to day program on Monday 5/20 .    If you have any questions or concerns, please don't hesitate to call.         Sincerely,            Ankiet Whatley PA-C

## 2024-05-11 LAB
ANION GAP SERPL CALCULATED.3IONS-SCNC: 6 MMOL/L (ref 4–13)
ATRIAL RATE: 77 BPM
BASOPHILS # BLD AUTO: 0.05 THOUSANDS/ÂΜL (ref 0–0.1)
BASOPHILS NFR BLD AUTO: 1 % (ref 0–1)
BUN SERPL-MCNC: 14 MG/DL (ref 5–25)
CALCIUM SERPL-MCNC: 8 MG/DL (ref 8.4–10.2)
CHLORIDE SERPL-SCNC: 107 MMOL/L (ref 96–108)
CO2 SERPL-SCNC: 32 MMOL/L (ref 21–32)
CREAT SERPL-MCNC: 0.9 MG/DL (ref 0.6–1.3)
EOSINOPHIL # BLD AUTO: 0.2 THOUSAND/ÂΜL (ref 0–0.61)
EOSINOPHIL NFR BLD AUTO: 4 % (ref 0–6)
ERYTHROCYTE [DISTWIDTH] IN BLOOD BY AUTOMATED COUNT: 13.9 % (ref 11.6–15.1)
GFR SERPL CREATININE-BSD FRML MDRD: 69 ML/MIN/1.73SQ M
GLUCOSE SERPL-MCNC: 107 MG/DL (ref 65–140)
GLUCOSE SERPL-MCNC: 121 MG/DL (ref 65–140)
GLUCOSE SERPL-MCNC: 122 MG/DL (ref 65–140)
GLUCOSE SERPL-MCNC: 92 MG/DL (ref 65–140)
GLUCOSE SERPL-MCNC: 96 MG/DL (ref 65–140)
HCT VFR BLD AUTO: 30.9 % (ref 34.8–46.1)
HGB BLD-MCNC: 10 G/DL (ref 11.5–15.4)
IMM GRANULOCYTES # BLD AUTO: 0.02 THOUSAND/UL (ref 0–0.2)
IMM GRANULOCYTES NFR BLD AUTO: 0 % (ref 0–2)
LACTATE SERPL-SCNC: 0.8 MMOL/L (ref 0.5–2)
LYMPHOCYTES # BLD AUTO: 2.49 THOUSANDS/ÂΜL (ref 0.6–4.47)
LYMPHOCYTES NFR BLD AUTO: 44 % (ref 14–44)
MCH RBC QN AUTO: 33.4 PG (ref 26.8–34.3)
MCHC RBC AUTO-ENTMCNC: 32.4 G/DL (ref 31.4–37.4)
MCV RBC AUTO: 103 FL (ref 82–98)
MONOCYTES # BLD AUTO: 0.51 THOUSAND/ÂΜL (ref 0.17–1.22)
MONOCYTES NFR BLD AUTO: 9 % (ref 4–12)
NEUTROPHILS # BLD AUTO: 2.37 THOUSANDS/ÂΜL (ref 1.85–7.62)
NEUTS SEG NFR BLD AUTO: 42 % (ref 43–75)
NRBC BLD AUTO-RTO: 0 /100 WBCS
P AXIS: 39 DEGREES
PLATELET # BLD AUTO: 127 THOUSANDS/UL (ref 149–390)
PMV BLD AUTO: 12.1 FL (ref 8.9–12.7)
POTASSIUM SERPL-SCNC: 4.4 MMOL/L (ref 3.5–5.3)
PR INTERVAL: 146 MS
PROCALCITONIN SERPL-MCNC: 0.1 NG/ML
QRS AXIS: 77 DEGREES
QRSD INTERVAL: 78 MS
QT INTERVAL: 402 MS
QTC INTERVAL: 454 MS
RBC # BLD AUTO: 2.99 MILLION/UL (ref 3.81–5.12)
SODIUM SERPL-SCNC: 145 MMOL/L (ref 135–147)
T WAVE AXIS: 71 DEGREES
VENTRICULAR RATE: 77 BPM
WBC # BLD AUTO: 5.64 THOUSAND/UL (ref 4.31–10.16)

## 2024-05-11 PROCEDURE — 85025 COMPLETE CBC W/AUTO DIFF WBC: CPT | Performed by: HOSPITALIST

## 2024-05-11 PROCEDURE — 93010 ELECTROCARDIOGRAM REPORT: CPT | Performed by: INTERNAL MEDICINE

## 2024-05-11 PROCEDURE — 82948 REAGENT STRIP/BLOOD GLUCOSE: CPT

## 2024-05-11 PROCEDURE — 84145 PROCALCITONIN (PCT): CPT | Performed by: HOSPITALIST

## 2024-05-11 PROCEDURE — 80048 BASIC METABOLIC PNL TOTAL CA: CPT | Performed by: HOSPITALIST

## 2024-05-11 PROCEDURE — 83605 ASSAY OF LACTIC ACID: CPT | Performed by: HOSPITALIST

## 2024-05-11 PROCEDURE — 99233 SBSQ HOSP IP/OBS HIGH 50: CPT | Performed by: FAMILY MEDICINE

## 2024-05-11 RX ORDER — VANCOMYCIN HYDROCHLORIDE 1 G/200ML
1000 INJECTION, SOLUTION INTRAVENOUS EVERY 24 HOURS
Status: DISCONTINUED | OUTPATIENT
Start: 2024-05-12 | End: 2024-05-13

## 2024-05-11 RX ORDER — VANCOMYCIN HYDROCHLORIDE 750 MG/150ML
15 INJECTION, SOLUTION INTRAVENOUS EVERY 12 HOURS
Status: DISCONTINUED | OUTPATIENT
Start: 2024-05-11 | End: 2024-05-11

## 2024-05-11 RX ADMIN — LEVOTHYROXINE SODIUM 25 MCG: 25 TABLET ORAL at 05:24

## 2024-05-11 RX ADMIN — VANCOMYCIN HYDROCHLORIDE 1250 MG: 1 INJECTION, POWDER, LYOPHILIZED, FOR SOLUTION INTRAVENOUS at 19:22

## 2024-05-11 RX ADMIN — CITALOPRAM HYDROBROMIDE 20 MG: 20 TABLET ORAL at 10:00

## 2024-05-11 RX ADMIN — CARBIDOPA AND LEVODOPA 2 TABLET: 25; 100 TABLET ORAL at 10:35

## 2024-05-11 RX ADMIN — CEFTRIAXONE SODIUM 1000 MG: 10 INJECTION, POWDER, FOR SOLUTION INTRAVENOUS at 10:35

## 2024-05-11 RX ADMIN — Medication 1000 UNITS: at 10:00

## 2024-05-11 RX ADMIN — ENOXAPARIN SODIUM 40 MG: 40 INJECTION SUBCUTANEOUS at 10:00

## 2024-05-11 RX ADMIN — VALBENAZINE 40 MG: 40 CAPSULE ORAL at 10:01

## 2024-05-11 RX ADMIN — DIVALPROEX SODIUM 375 MG: 125 CAPSULE, COATED PELLETS ORAL at 10:00

## 2024-05-11 RX ADMIN — SODIUM CHLORIDE, SODIUM GLUCONATE, SODIUM ACETATE, POTASSIUM CHLORIDE, MAGNESIUM CHLORIDE, SODIUM PHOSPHATE, DIBASIC, AND POTASSIUM PHOSPHATE 125 ML/HR: .53; .5; .37; .037; .03; .012; .00082 INJECTION, SOLUTION INTRAVENOUS at 10:00

## 2024-05-11 NOTE — PROGRESS NOTES
[facility]  Progress Note  Name: Terrie Alicea I  MRN: [id number]  Unit/Bed#: W -01 I Date of Admission: 5/10/2024   Date of Service: 5/11/2024 I Hospital Day: 1    Assessment/Plan   * Sepsis due to gram-negative UTI  (HCC)  Assessment & Plan  Patient with cerebral palsy, intellectual disability, mostly nonverbal at baseline, presented from group home with fever and increased drowsiness.    Met Sepsis criteria in ED with fever, lactic acidosis, hypoxia.   Patient given 30 cc/kg isolate bolus. Diet advanced today. DC IVF  Urinalysis positive.  she has had UTIs with E. coli and Klebsiella in the past. Klebsiella and E. coli have been sensitive to cephalosporins in the past (December 2021 and August 2023).  Will continue with Rocephin started in the ER.  Follow blood cultures and urine culture  SCAR due to sepsis, POA, trend creatinine with IV fluids  Acute metabolic encephalopathy due to sepsis, POA, follow mentation with treatment of underlying infection    Acute kidney injury (HCC)  Assessment & Plan  resolved    Lactic acidosis  Assessment & Plan  resolved    Hypothyroidism  Assessment & Plan  -Continue Levoxyl    Acute metabolic encephalopathy  Assessment & Plan  Secondary to UTI.  At baseline, patient is mostly nonverbal, only says few word, does not carry conversation. Presented with increased sleepiness/drowsiness.  On my exam, she is awake, alert. Makes eye contact, does not answer any question. Appearing almost back to baseline.  Speech appreciated, advance diet to Pureed/HTL. Holding vitamins, statins for now.    Type 2 diabetes mellitus, without long-term current use of insulin ([facility])  Assessment & Plan  Lab Results   Component Value Date    HGBA1C 6.3 (H) 05/10/2024       Recent Labs     05/10/24  2104 05/11/24  0035 05/11/24  0709 05/11/24  1049   POCGLU 90 96 107 122       Blood Sugar Average: Last 72 hrs:  (P) 102.8  -SSI/accuchecks    Severe Intellectual  disability  Assessment & Plan  -As per caregiver patient usually can say a few words at a time.  Other times she just grunts.    Bipolar disorder (HCC)  Assessment & Plan  Continue Citalopram, Depakote.   Holding trazodone, ativan and Abilify due to increased somnolence. May resume on discharge    Mixed cerebral palsy (HCC)  Assessment & Plan  Noted patient is on Sinemet ER and velbenazine. Switched sinemet to IR due to unable to crush ER. Holding velbenazine until pharmacy verifies whether can be crushed.    Gastroesophageal reflux disease  Assessment & Plan  -Continue PPI            VTE Pharmacologic Prophylaxis:   Pharmacologic: Enoxaparin (Lovenox)  Mechanical VTE Prophylaxis in Place: Yes    Patient Centered Rounds: I have performed bedside rounds with nursing staff today.       Current Length of Stay: 1 day(s)    Current Patient Status: Inpatient   Certification Statement: The patient will continue to require additional inpatient hospital stay due to pending clinical improvement     Discharge Plan: 48+ hours    Code Status: Level 1 - Full Code      Subjective:   On my exam, she is awake, alert. Makes eye contact, does not answer any question. Appearing almost back to baseline. RN attempting to administer medication with applesauce. Patient cooperative       Objective:     Vitals:   Temp (24hrs), Av.7 °F (37.1 °C), Min:97.3 °F (36.3 °C), Max:100.4 °F (38 °C)    Temp:  [97.3 °F (36.3 °C)-100.4 °F (38 °C)] 97.3 °F (36.3 °C)  HR:  [65-76] 75  Resp:  [17-18] 17  BP: ()/(52-74) 112/57  SpO2:  [87 %-95 %] 95 %  Body mass index is 22.79 kg/m².     Input and Output Summary (last 24 hours):       Intake/Output Summary (Last 24 hours) at 2024 1123  Last data filed at 2024 0501  Gross per 24 hour   Intake 1800 ml   Output --   Net 1800 ml       Physical Exam:     Physical Exam  Constitutional:       Appearance: She is well-developed.   HENT:      Head: Normocephalic and atraumatic.   Pulmonary:       Effort: Pulmonary effort is normal. No respiratory distress.      Breath sounds: Normal breath sounds.   Musculoskeletal:      Cervical back: Normal range of motion.   Skin:     General: Skin is warm and dry.   Neurological:      Comments: Nonverbal at baseline            Additional Data:     Labs:    Results from last 7 days   Lab Units 05/11/24  0512   WBC Thousand/uL 5.64   HEMOGLOBIN g/dL 10.0*   HEMATOCRIT % 30.9*   PLATELETS Thousands/uL 127*   SEGS PCT % 42*   LYMPHO PCT % 44   MONO PCT % 9   EOS PCT % 4     Results from last 7 days   Lab Units 05/11/24  0512 05/10/24  1002   SODIUM mmol/L 145 140   POTASSIUM mmol/L 4.4 5.3   CHLORIDE mmol/L 107 100   CO2 mmol/L 32 27   BUN mg/dL 14 25   CREATININE mg/dL 0.90 1.20   ANION GAP mmol/L 6 13   CALCIUM mg/dL 8.0* 9.5   ALBUMIN g/dL  --  3.6   TOTAL BILIRUBIN mg/dL  --  0.29   ALK PHOS U/L  --  32*   ALT U/L  --  3*   AST U/L  --  20   GLUCOSE RANDOM mg/dL 92 208*     Results from last 7 days   Lab Units 05/10/24  1002   INR  0.87     Results from last 7 days   Lab Units 05/11/24  1049 05/11/24  0709 05/11/24  0035 05/10/24  2104 05/10/24  1919   POC GLUCOSE mg/dl 122 107 96 90 99     Results from last 7 days   Lab Units 05/10/24  1002   HEMOGLOBIN A1C % 6.3*     Results from last 7 days   Lab Units 05/11/24  0512 05/10/24  1925 05/10/24  1612 05/10/24  1310 05/10/24  1002   LACTIC ACID mmol/L 0.8 3.6* 4.2* 6.1* 7.6*   PROCALCITONIN ng/ml 0.10  --  0.06  --  <0.05            Recent Cultures (last 7 days):     Results from last 7 days   Lab Units 05/10/24  1002   BLOOD CULTURE  Received in Microbiology Lab. Culture in Progress.  Received in Microbiology Lab. Culture in Progress.       Last 24 Hours Medication List:   Current Facility-Administered Medications   Medication Dose Route Frequency Provider Last Rate    acetaminophen  650 mg Oral Q6H PRN Geoffrey Danielle MD      bisacodyl  10 mg Rectal Daily PRN Geoffrey Danielle MD      carbidopa-levodopa  2 tablet Oral TID  Amy Moulton MD      cefTRIAXone  1,000 mg Intravenous Q24H Geoffrey Danielle MD 1,000 mg (05/11/24 1035)    citalopram  20 mg Oral Daily Geoffrey Danielle MD      divalproex sodium  375 mg Oral TID Geoffrey Danielle MD      enoxaparin  40 mg Subcutaneous Daily Geoffrey Danielle MD      insulin lispro  1-5 Units Subcutaneous TID AC Geoffrey Danielle MD      levothyroxine  25 mcg Oral Early Morning Geoffrey Danielle MD      multi-electrolyte  125 mL/hr Intravenous Continuous Geoffrey Danielle  mL/hr (05/11/24 1000)    Valbenazine Tosylate  40 mg Oral Daily Geoffrey Danielle MD          Today, Patient Was Seen By: Amy Moulton MD    ** Please Note: Dictation voice to text software may have been used in the creation of this document. **

## 2024-05-11 NOTE — ASSESSMENT & PLAN NOTE
Noted patient is on Sinemet ER and velbenazine. Switched sinemet to IR due to unable to crush ER. Holding velbenazine until pharmacy verifies whether can be crushed.

## 2024-05-11 NOTE — ASSESSMENT & PLAN NOTE
Lab Results   Component Value Date    HGBA1C 6.3 (H) 05/10/2024       Recent Labs     05/10/24  2104 05/11/24  0035 05/11/24  0709 05/11/24  1049   POCGLU 90 96 107 122       Blood Sugar Average: Last 72 hrs:  (P) 102.8  -SSI/accuchecks

## 2024-05-11 NOTE — ASSESSMENT & PLAN NOTE
Secondary to UTI.  At baseline, patient is mostly nonverbal, only says few word, does not carry conversation. Presented with increased sleepiness/drowsiness.  On my exam, she is awake, alert. Makes eye contact, does not answer any question. Appearing almost back to baseline.  Speech appreciated, advance diet to Pureed/HTL. Holding vitamins, statins for now.

## 2024-05-11 NOTE — ASSESSMENT & PLAN NOTE
Patient with cerebral palsy, intellectual disability, mostly nonverbal at baseline, presented from group home with fever and increased drowsiness.    Met Sepsis criteria in ED with fever, lactic acidosis, hypoxia.   Patient given 30 cc/kg isolate bolus. Diet advanced today. DC IVF  Urinalysis positive.  she has had UTIs with E. coli and Klebsiella in the past. Klebsiella and E. coli have been sensitive to cephalosporins in the past (December 2021 and August 2023).  Will continue with Rocephin started in the ER.  Follow blood cultures and urine culture  SCAR due to sepsis, POA, trend creatinine with IV fluids  Acute metabolic encephalopathy due to sepsis, POA, follow mentation with treatment of underlying infection

## 2024-05-11 NOTE — ASSESSMENT & PLAN NOTE
Continue Citalopram, Depakote.   Holding trazodone, ativan and Abilify due to increased somnolence. May resume on discharge

## 2024-05-12 ENCOUNTER — APPOINTMENT (INPATIENT)
Dept: RADIOLOGY | Facility: HOSPITAL | Age: 64
DRG: 871 | End: 2024-05-12
Payer: MEDICARE

## 2024-05-12 ENCOUNTER — APPOINTMENT (INPATIENT)
Dept: CT IMAGING | Facility: HOSPITAL | Age: 64
DRG: 871 | End: 2024-05-12
Payer: MEDICARE

## 2024-05-12 LAB
ANION GAP SERPL CALCULATED.3IONS-SCNC: 17 MMOL/L (ref 4–13)
BACTERIA UR CULT: ABNORMAL
BUN SERPL-MCNC: 11 MG/DL (ref 5–25)
CALCIUM SERPL-MCNC: 8.8 MG/DL (ref 8.4–10.2)
CHLORIDE SERPL-SCNC: 102 MMOL/L (ref 96–108)
CO2 SERPL-SCNC: 21 MMOL/L (ref 21–32)
CREAT SERPL-MCNC: 0.92 MG/DL (ref 0.6–1.3)
ERYTHROCYTE [DISTWIDTH] IN BLOOD BY AUTOMATED COUNT: 13.6 % (ref 11.6–15.1)
GFR SERPL CREATININE-BSD FRML MDRD: 67 ML/MIN/1.73SQ M
GLUCOSE SERPL-MCNC: 119 MG/DL (ref 65–140)
GLUCOSE SERPL-MCNC: 128 MG/DL (ref 65–140)
GLUCOSE SERPL-MCNC: 132 MG/DL (ref 65–140)
GLUCOSE SERPL-MCNC: 197 MG/DL (ref 65–140)
GLUCOSE SERPL-MCNC: 241 MG/DL (ref 65–140)
GLUCOSE SERPL-MCNC: 247 MG/DL (ref 65–140)
GLUCOSE SERPL-MCNC: 255 MG/DL (ref 65–140)
HCT VFR BLD AUTO: 35.9 % (ref 34.8–46.1)
HGB BLD-MCNC: 11.3 G/DL (ref 11.5–15.4)
LACTATE SERPL-SCNC: 1.2 MMOL/L (ref 0.5–2)
MCH RBC QN AUTO: 32.7 PG (ref 26.8–34.3)
MCHC RBC AUTO-ENTMCNC: 31.5 G/DL (ref 31.4–37.4)
MCV RBC AUTO: 104 FL (ref 82–98)
PLATELET # BLD AUTO: 108 THOUSANDS/UL (ref 149–390)
PMV BLD AUTO: 12.6 FL (ref 8.9–12.7)
POTASSIUM SERPL-SCNC: 4.1 MMOL/L (ref 3.5–5.3)
PROCALCITONIN SERPL-MCNC: 0.06 NG/ML
RBC # BLD AUTO: 3.46 MILLION/UL (ref 3.81–5.12)
SODIUM SERPL-SCNC: 140 MMOL/L (ref 135–147)
WBC # BLD AUTO: 8.21 THOUSAND/UL (ref 4.31–10.16)

## 2024-05-12 PROCEDURE — 83605 ASSAY OF LACTIC ACID: CPT | Performed by: NURSE PRACTITIONER

## 2024-05-12 PROCEDURE — 99233 SBSQ HOSP IP/OBS HIGH 50: CPT | Performed by: PHYSICIAN ASSISTANT

## 2024-05-12 PROCEDURE — 92526 ORAL FUNCTION THERAPY: CPT

## 2024-05-12 PROCEDURE — 82948 REAGENT STRIP/BLOOD GLUCOSE: CPT

## 2024-05-12 PROCEDURE — 71250 CT THORAX DX C-: CPT

## 2024-05-12 PROCEDURE — 99223 1ST HOSP IP/OBS HIGH 75: CPT | Performed by: INTERNAL MEDICINE

## 2024-05-12 PROCEDURE — 85027 COMPLETE CBC AUTOMATED: CPT | Performed by: FAMILY MEDICINE

## 2024-05-12 PROCEDURE — 80048 BASIC METABOLIC PNL TOTAL CA: CPT | Performed by: FAMILY MEDICINE

## 2024-05-12 PROCEDURE — 74176 CT ABD & PELVIS W/O CONTRAST: CPT

## 2024-05-12 PROCEDURE — NC001 PR NO CHARGE: Performed by: NURSE PRACTITIONER

## 2024-05-12 PROCEDURE — 71045 X-RAY EXAM CHEST 1 VIEW: CPT

## 2024-05-12 PROCEDURE — 87040 BLOOD CULTURE FOR BACTERIA: CPT | Performed by: NURSE PRACTITIONER

## 2024-05-12 PROCEDURE — 84145 PROCALCITONIN (PCT): CPT | Performed by: INTERNAL MEDICINE

## 2024-05-12 RX ORDER — SODIUM CHLORIDE, SODIUM GLUCONATE, SODIUM ACETATE, POTASSIUM CHLORIDE, MAGNESIUM CHLORIDE, SODIUM PHOSPHATE, DIBASIC, AND POTASSIUM PHOSPHATE .53; .5; .37; .037; .03; .012; .00082 G/100ML; G/100ML; G/100ML; G/100ML; G/100ML; G/100ML; G/100ML
125 INJECTION, SOLUTION INTRAVENOUS CONTINUOUS
Status: DISCONTINUED | OUTPATIENT
Start: 2024-05-12 | End: 2024-05-14

## 2024-05-12 RX ORDER — ACETAMINOPHEN 650 MG/1
325 SUPPOSITORY RECTAL EVERY 4 HOURS PRN
Status: DISCONTINUED | OUTPATIENT
Start: 2024-05-12 | End: 2024-05-15 | Stop reason: HOSPADM

## 2024-05-12 RX ORDER — ACETAMINOPHEN 650 MG/1
650 SUPPOSITORY RECTAL ONCE
Status: COMPLETED | OUTPATIENT
Start: 2024-05-12 | End: 2024-05-12

## 2024-05-12 RX ADMIN — DIVALPROEX SODIUM 375 MG: 125 CAPSULE, COATED PELLETS ORAL at 21:06

## 2024-05-12 RX ADMIN — CARBIDOPA AND LEVODOPA 2 TABLET: 25; 100 TABLET ORAL at 21:06

## 2024-05-12 RX ADMIN — INSULIN LISPRO 2 UNITS: 100 INJECTION, SOLUTION INTRAVENOUS; SUBCUTANEOUS at 08:36

## 2024-05-12 RX ADMIN — CEFEPIME 2000 MG: 2 INJECTION, POWDER, FOR SOLUTION INTRAVENOUS at 22:30

## 2024-05-12 RX ADMIN — ACETAMINOPHEN 650 MG: 650 SUPPOSITORY RECTAL at 08:37

## 2024-05-12 RX ADMIN — CEFEPIME 2000 MG: 2 INJECTION, POWDER, FOR SOLUTION INTRAVENOUS at 11:28

## 2024-05-12 RX ADMIN — DIVALPROEX SODIUM 375 MG: 125 CAPSULE, COATED PELLETS ORAL at 17:20

## 2024-05-12 RX ADMIN — CARBIDOPA AND LEVODOPA 2 TABLET: 25; 100 TABLET ORAL at 17:19

## 2024-05-12 RX ADMIN — ACETAMINOPHEN 325 MG: 650 SUPPOSITORY RECTAL at 03:58

## 2024-05-12 RX ADMIN — ENOXAPARIN SODIUM 40 MG: 40 INJECTION SUBCUTANEOUS at 08:36

## 2024-05-12 RX ADMIN — VANCOMYCIN HYDROCHLORIDE 1000 MG: 1 INJECTION, SOLUTION INTRAVENOUS at 08:38

## 2024-05-12 RX ADMIN — INSULIN LISPRO 1 UNITS: 100 INJECTION, SOLUTION INTRAVENOUS; SUBCUTANEOUS at 12:04

## 2024-05-12 RX ADMIN — SODIUM CHLORIDE, SODIUM GLUCONATE, SODIUM ACETATE, POTASSIUM CHLORIDE, MAGNESIUM CHLORIDE, SODIUM PHOSPHATE, DIBASIC, AND POTASSIUM PHOSPHATE 125 ML/HR: .53; .5; .37; .037; .03; .012; .00082 INJECTION, SOLUTION INTRAVENOUS at 08:38

## 2024-05-12 RX ADMIN — SODIUM CHLORIDE 1000 ML: 0.9 INJECTION, SOLUTION INTRAVENOUS at 11:09

## 2024-05-12 NOTE — SPEECH THERAPY NOTE
Speech Language/Pathology    Speech/Language Pathology Progress Note    Patient Name: Terrie Alicea  Today's Date: 5/12/2024     Problem List  Principal Problem:    Sepsis due to gram-negative UTI  ([facility])  Active Problems:    Gastroesophageal reflux disease    Mixed cerebral palsy (HCC)    Bipolar disorder (HCC)    Severe Intellectual disability    Type 2 diabetes mellitus, without long-term current use of insulin (HCC)    Acute metabolic encephalopathy    Hypothyroidism    Lactic acidosis    Acute kidney injury (HCC)       Past Medical History  Past Medical History:   Diagnosis Date    Adjustment disorder     Altered mental status 12/11/2015    Anemia     Bipolar 1 disorder (HCC)     Cerebral palsy (HCC)     Chronic hypernatremia 02/06/2016    Closed fracture of left hip ([facility]) 01/19/2016    Closed left hip fracture ([facility])     no surgery    Constipation     Dehydration 02/20/2016    Developmental delay     Diabetes mellitus ([facility])     Difficulty walking     Disease of thyroid gland     Diverticulosis     Dysphagia     Worsening    Fracture of multiple ribs of right side     Herpes zoster without complication 06/02/2021    Hip fracture ([facility]) 07/26/2015    left    Hyperlipidemia     Hypernatremia 12/28/2018    Hypotension     Impulse control disorder     Incontinence     Intellectual disability due to developmental disorder, unspecified     Microalbuminuria     Neuropathy in diabetes ([facility])     Osteopathia     Osteoporosis     Peripheral neuropathy     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HCC)     Thrombocytopenia ([facility]) 07/31/2015        Past Surgical History  Past Surgical History:   Procedure Laterality Date    ABDOMINAL SURGERY      COLECTOMY MIN      re-anastomosis 7/22/16    COLON SURGERY  1/31/16    COLONOSCOPY N/A 07/21/2016    Procedure: COLONOSCOPY;  Surgeon: Rosalino Jacome MD;  Location: [facility];  Service:     COLONOSCOPY N/A 01/31/2016    Procedure: COLONOSCOPY;  Surgeon: Rosalino Jacome MD;  Location: [facility];   Service:     COLONOSCOPY N/A 07/25/2017    Procedure: COLONOSCOPY;  Surgeon: Rosalino Jacome MD;  Location: [facility];  Service: Colorectal    COLOSTOMY      EXPLORATORY LAPAROTOMY W/ BOWEL RESECTION N/A 01/31/2016    Procedure: exploratory laparotomy, left sigmoidectomy, coloproctostomy, take down splenic flexure, loop colostomy;  Surgeon: Rosalino Jacome MD;  Location: [facility];  Service:     CO CLOSURE ENTEROSTOMY LG/SMALL INTESTINE N/A 07/22/2016    Procedure: SEGMENTAL COLECTOMY WITH COLOCOLOSTOMY;  Surgeon: Rosalino Jacome MD;  Location: [facility];  Service: Colorectal    UPPER GASTROINTESTINAL ENDOSCOPY           Subjective:  Pt awake/alert while in bed as SLP entered the room. FLACC 0. Pt currently on supplemental oxygen via nasal canula 2 liters.     Objective:  Per nurse, patient made NPO last night due to increased wet breath sounds, fever, and change in blood pressure. Chest XR completed 5/12/24 with minimal right base atelectasis versus early infiltrate. Lungs otherwise appear clear. CT Chest Abdomen Pelvis currently pending.     Pt with fair acceptance of bolus from spoon due to as pt was somewhat reluctant to accept PO by unfamiliar caregiver/SLP. Slight anterior leakage. Suspect adequate bolus control but delayed transfer and swallow initiation. No immediate coughing, throat clearing, or change in respiratory functioning.     Assessment:  Pt with history of dysphagia/aspiration as documented in bedside swallow evaluation, treatment, and VBS studies. Pt continues to be at risk for aspiration PNA due to being dependent on oral care, dependent on feeding, medical diagnoses, reduced mobility, and history of swallowing problems that includes GERD. Pt has been consuming puree and HTL via tsp since 2020. Continue with strict oral care and swallow precautions in order to maximize safety for PO intake.     Plan/Recommendations:  Puree  Pudding Thick Liquids by Teaspoon  Medications Crushed in  Puree  Oral Care 3x per day   Swallow Precautions: upright, feed only when awake/alert, slow rate, small bites, remain upright for 30 minutes post meals   ST to continue to follow w/ goal of advancing liquids back to HTL vs PTL

## 2024-05-12 NOTE — PROGRESS NOTES
Terrie Alicea is a 63 y.o. female who is currently ordered Vancomycin IV with management by the Pharmacy Consult service.  Relevant clinical data and objective / subjective history reviewed.  Vancomycin Assessment:  Indication and Goal AUC/Trough: Pneumonia (goal -600, trough >10)  Clinical Status: stable  Micro:     Renal Function:  SCr: 0.92 mg/dL  CrCl: 40.3 mL/min  Renal replacement: Not on dialysis  Days of Therapy: 2  Current Dose: 1g IV q24 hours  Vancomycin Plan:  New Dosing: continue current regimen  Estimated AUC: 537 mcg*hr/mL  Estimated Trough: 13.5 mcg/mL  Next Level: 5/13 @ 0600  Renal Function Monitoring: Daily BMP and UOP  Pharmacy will continue to follow closely for s/sx of nephrotoxicity, infusion reactions and appropriateness of therapy.  BMP and CBC will be ordered per protocol. We will continue to follow the patient’s culture results and clinical progress daily.    Stephania Luciano, Pharmacist

## 2024-05-12 NOTE — PROGRESS NOTES
Deterioration Index Critical Care Recommendations  Room #: W 413  Deterioration index score: 60.81%    Critical Care recommends   Check full set of VS  Add lactic acid to BMP, CBC, procal   Continue ceftriaxone and vancomycin  Repeat blood cultures -- previous set drawn > 24 hours ago and 1/2 with GPC clusters (prelim with coag negative staph).  Suspect contamination.  Would give IVF bolus if lactic elevated  Previous cultures reviewed.  She has had multiple Klebsiella UTI sensitive to ceftriaxone and in 2023 Ecoli UTI also sensitive to ceftriaxone.    Spoke with LEATHA Robison from primary team    Brief summary:   Critical care was brought to the patient's bedside via deterioration index alert. The alert was concerning for Tachycardia, tachypnea, and temp 103F.     VS 103F, 104, RR 30, /91 (109), 96%    Please contact critical care via [place] [facility] with any questions or concerns.

## 2024-05-12 NOTE — ASSESSMENT & PLAN NOTE
Lab Results   Component Value Date    HGBA1C 6.3 (H) 05/10/2024       Recent Labs     05/12/24  0023 05/12/24  0644 05/12/24  0745 05/12/24  1203   POCGLU 128 247* 255* 197*         Blood Sugar Average: Last 72 hrs:  (P) 146.2  -SSI/accuchecks

## 2024-05-12 NOTE — PROGRESS NOTES
[facility]  Progress Note  Name: Terrie Alicea I  MRN: [id number]  Unit/Bed#: W -01 I Date of Admission: 5/10/2024   Date of Service: 5/12/2024 I Hospital Day: 2    Assessment/Plan   Type 2 diabetes mellitus, without long-term current use of insulin ([facility])  Assessment & Plan  Lab Results   Component Value Date    HGBA1C 6.3 (H) 05/10/2024       Recent Labs     05/12/24  0023 05/12/24  0644 05/12/24  0745 05/12/24  1203   POCGLU 128 247* 255* 197*         Blood Sugar Average: Last 72 hrs:  (P) 146.2  -SSI/accuchecks    Bipolar disorder ([facility])  Assessment & Plan  Continue Citalopram, Depakote.   Holding trazodone, ativan and Abilify due to increased somnolence. May resume on discharge    Hypothyroidism  Assessment & Plan  -Continue Levoxyl    Acute kidney injury (HCC)  Assessment & Plan  resolved    Lactic acidosis  Assessment & Plan  resolved    Acute metabolic encephalopathy  Assessment & Plan  Secondary to UTI.  At baseline, patient is mostly nonverbal, only says few word, does not carry conversation. Presented with increased sleepiness/drowsiness.  On my exam, she is awake, alert. Makes eye contact, does not answer any question. Appearing almost back to baseline.  Speech appreciated. Holding vitamins, statins for now.    Severe Intellectual disability  Assessment & Plan  -As per caregiver patient usually can say a few words at a time.  Other times she just grunts.    Gastroesophageal reflux disease  Assessment & Plan  -Continue PPI    * Sepsis due to gram-negative UTI  ([facility])  Assessment & Plan  Patient with cerebral palsy, intellectual disability, mostly nonverbal at baseline, presented from group home with fever and increased drowsiness.    Met Sepsis criteria in ED with fever, lactic acidosis, hypoxia.   IV fluids initially discontinued after diet resumed however patient became severely septic again today and hypotensive.  Required 1L bolus today and was fluid-responsive,  continue aggressive maintenance fluids  Urinalysis positive and urine culture growing greater than 100 K CFU per mL Klebsiella pneumonia  She has had UTIs with E. coli and Klebsiella in the past. Klebsiella and E. coli have been sensitive to cephalosporins in the past (December 2021 and August 2023)  Concerning or aspiration event overnight: fever continues to recur and chest x-ray is concerning for developing pneumonia.  Antibiotics escalated to cefepime and Vanc  Initial blood cultures with 1 of 2 positive for gram-positive CC, likely contaminant. Repeat Blood Cx's pending  ID consulted  SCAR due to sepsis, POA, trend creatinine with IV fluids  Acute metabolic encephalopathy due to sepsis, POA, follow mentation with treatment of underlying infection  CT chest abdomen pelvis ordered to rule out urinary tract obstruction and further assess for origin of fever         VTE Pharmacologic Prophylaxis: VTE Score: 6 High Risk (Score >/= 5) - Pharmacological DVT Prophylaxis Ordered: enoxaparin (Lovenox). Sequential Compression Devices Ordered.    Mobility:   Basic Mobility Inpatient Raw Score: 7  JH-HLM Goal: 2: Bed activities/Dependent transfer  JH-HLM Achieved: 2: Bed activities/Dependent transfer  JH-HLM Goal achieved. Continue to encourage appropriate mobility.    Patient Centered Rounds: I performed bedside rounds with nursing staff today.   Discussions with Specialists or Other Care Team Provider: ID and nursing    Education and Discussions with Family / Patient:  caregiver.     Total Time Spent on Date of Encounter in care of patient: 45 mins. This time was spent on one or more of the following: performing physical exam; counseling and coordination of care; obtaining or reviewing history; documenting in the medical record; reviewing/ordering tests, medications or procedures; communicating with other healthcare professionals and discussing with patient's family/caregivers.    Current Length of Stay: 2 day(s)  Current  Patient Status: Inpatient   Certification Statement: The patient will continue to require additional inpatient hospital stay due to severe sepsis on broad spectrum abx  Discharge Plan: Anticipate discharge in >72 hrs to [facility].    Code Status: Level 1 - Full Code    Subjective:   Patient has nonverbal on exam.    Objective:     Vitals:   Temp (24hrs), Av.7 °F (38.2 °C), Min:98.2 °F (36.8 °C), Max:103 °F (39.4 °C)    Temp:  [98.2 °F (36.8 °C)-103 °F (39.4 °C)] 98.2 °F (36.8 °C)  HR:  [] 65  Resp:  [18-32] 20  BP: ()/(43-91) 104/54  SpO2:  [89 %-97 %] 97 %  Body mass index is 23.33 kg/m².     Input and Output Summary (last 24 hours):     Intake/Output Summary (Last 24 hours) at 2024 1358  Last data filed at 2024 0706  Gross per 24 hour   Intake 30 ml   Output --   Net 30 ml       Physical Exam:   Physical Exam  Vitals and nursing note reviewed.   Constitutional:       General: She is not in acute distress.     Appearance: Normal appearance. She is normal weight. She is not ill-appearing or toxic-appearing.      Comments: Patient is awake and alert and makes eye contact, appears comfortable   HENT:      Head: Normocephalic and atraumatic.      Right Ear: External ear normal.      Left Ear: External ear normal.      Nose: Nose normal.      Mouth/Throat:      Mouth: Mucous membranes are moist.      Pharynx: Oropharynx is clear.   Eyes:      Conjunctiva/sclera: Conjunctivae normal.   Cardiovascular:      Rate and Rhythm: Regular rhythm. Tachycardia present.      Pulses: Normal pulses.      Heart sounds: Normal heart sounds. No murmur heard.     No gallop.   Pulmonary:      Effort: Pulmonary effort is normal. No respiratory distress.      Breath sounds: No stridor. Rales present. No rhonchi.      Comments: Crackles heard over right lung base, breathing does not appear labored  Abdominal:      General: Abdomen is flat. Bowel sounds are normal. There is no distension.      Palpations: Abdomen  is soft. There is no mass.      Tenderness: There is no abdominal tenderness. There is no guarding or rebound.      Hernia: No hernia is present.   Musculoskeletal:      Cervical back: Normal range of motion.      Right lower leg: No edema.      Left lower leg: No edema.   Skin:     General: Skin is warm and dry.   Neurological:      Mental Status: She is alert. Mental status is at baseline.          Additional Data:     Labs:  Results from last 7 days   Lab Units 05/12/24  0538 05/11/24  0512   WBC Thousand/uL 8.21 5.64   HEMOGLOBIN g/dL 11.3* 10.0*   HEMATOCRIT % 35.9 30.9*   PLATELETS Thousands/uL 108* 127*   SEGS PCT %  --  42*   LYMPHO PCT %  --  44   MONO PCT %  --  9   EOS PCT %  --  4     Results from last 7 days   Lab Units 05/12/24  0538 05/11/24  0512 05/10/24  1002   SODIUM mmol/L 140   < > 140   POTASSIUM mmol/L 4.1   < > 5.3   CHLORIDE mmol/L 102   < > 100   CO2 mmol/L 21   < > 27   BUN mg/dL 11   < > 25   CREATININE mg/dL 0.92   < > 1.20   ANION GAP mmol/L 17*   < > 13   CALCIUM mg/dL 8.8   < > 9.5   ALBUMIN g/dL  --   --  3.6   TOTAL BILIRUBIN mg/dL  --   --  0.29   ALK PHOS U/L  --   --  32*   ALT U/L  --   --  3*   AST U/L  --   --  20   GLUCOSE RANDOM mg/dL 241*   < > 208*    < > = values in this interval not displayed.     Results from last 7 days   Lab Units 05/10/24  1002   INR  0.87     Results from last 7 days   Lab Units 05/12/24  1203 05/12/24  0745 05/12/24  0644 05/12/24  0023 05/11/24  1628 05/11/24  1049 05/11/24  0709 05/11/24  0035 05/10/24  2104 05/10/24  1919   POC GLUCOSE mg/dl 197* 255* 247* 128 121 122 107 96 90 99     Results from last 7 days   Lab Units 05/10/24  1002   HEMOGLOBIN A1C % 6.3*     Results from last 7 days   Lab Units 05/12/24  0538 05/11/24  0512 05/10/24  1925 05/10/24  1612 05/10/24  1310 05/10/24  1002   LACTIC ACID mmol/L 1.2 0.8 3.6* 4.2*   < > 7.6*   PROCALCITONIN ng/ml 0.06 0.10  --  0.06  --  <0.05    < > = values in this interval not displayed.        Lines/Drains:  Invasive Devices       Peripheral Intravenous Line  Duration             Peripheral IV 05/10/24 Right Antecubital 2 days    Peripheral IV 05/12/24 Left;Ventral (anterior) Forearm <1 day                          Imaging: Reviewed radiology reports from this admission including: chest xray    Recent Cultures (last 7 days):   Results from last 7 days   Lab Units 05/12/24  0540 05/10/24  1102 05/10/24  1002   BLOOD CULTURE  Received in Microbiology Lab. Culture in Progress.  Received in Microbiology Lab. Culture in Progress.  --  Staphylococcus species*  No Growth at 24 hrs.   GRAM STAIN RESULT   --   --  Gram positive cocci in clusters*   URINE CULTURE   --  >100,000 cfu/ml Klebsiella pneumoniae*  --        Last 24 Hours Medication List:   Current Facility-Administered Medications   Medication Dose Route Frequency Provider Last Rate    acetaminophen  325 mg Rectal Q4H PRN LEATHA Robison      bisacodyl  10 mg Rectal Daily PRN Geoffrey Danielle MD      carbidopa-levodopa  2 tablet Oral TID Amy Moulton MD      cefepime  2,000 mg Intravenous Q12H Madina Spence PA-C 2,000 mg (05/12/24 1128)    citalopram  20 mg Oral Daily Geoffrey Danielle MD      divalproex sodium  375 mg Oral TID Geoffrey Danielle MD      enoxaparin  40 mg Subcutaneous Daily Geoffrey Danielle MD      insulin lispro  1-5 Units Subcutaneous TID AC Geoffrey Danielle MD      levothyroxine  25 mcg Oral Early Morning Geoffrey Danielle MD      multi-electrolyte  125 mL/hr Intravenous Continuous Madina Spence PA-C 125 mL/hr (05/12/24 0838)    Valbenazine Tosylate  40 mg Oral Daily Geoffrey Danielle MD      vancomycin  1,000 mg Intravenous Q24H Jennifer Hernandez MD 1,000 mg (05/12/24 0838)        Today, Patient Was Seen By: Madina Spence PA-C    **Please Note: This note may have been constructed using a voice recognition system.**

## 2024-05-12 NOTE — ASSESSMENT & PLAN NOTE
Patient with cerebral palsy, intellectual disability, mostly nonverbal at baseline, presented from group home with fever and increased drowsiness.    Met Sepsis criteria in ED with fever, lactic acidosis, hypoxia.   IV fluids initially discontinued after diet resumed however patient became severely septic again today and hypotensive.  Required 1L bolus today and was fluid-responsive, continue aggressive maintenance fluids  Urinalysis positive and urine culture growing greater than 100 K CFU per mL Klebsiella pneumonia  She has had UTIs with E. coli and Klebsiella in the past. Klebsiella and E. coli have been sensitive to cephalosporins in the past (December 2021 and August 2023)  Concerning or aspiration event overnight: fever continues to recur and chest x-ray is concerning for developing pneumonia.  Antibiotics escalated to cefepime and Vanc  Initial blood cultures with 1 of 2 positive for gram-positive CC, likely contaminant. Repeat Blood Cx's pending  ID consulted  SCAR due to sepsis, POA, trend creatinine with IV fluids  Acute metabolic encephalopathy due to sepsis, POA, follow mentation with treatment of underlying infection  CT chest abdomen pelvis ordered to rule out urinary tract obstruction and further assess for origin of fever

## 2024-05-12 NOTE — PROGRESS NOTES
Terrie Alicea is a 63 y.o. female who is currently ordered Vancomycin IV with management by the Pharmacy Consult service.  Relevant clinical data and objective / subjective history reviewed.  Vancomycin Assessment:  Indication and Goal AUC/Trough: Pneumonia (goal -600, trough >10)  Clinical Status:  new  Micro:     Renal Function:  SCr: 0.9 mg/dL  CrCl: 41.4 mL/min  Renal replacement: Not on dialysis  Days of Therapy: 1  Current Dose: 1000 mg every 24 hours  Vancomycin Plan:  New Dosing: continue current dose  Estimated AUC: 527 mcg*hr/mL  Estimated Trough: 13.1 mcg/mL  Next Level: 5/12 @ 0800  Renal Function Monitoring: Daily BMP and UOP  Pharmacy will continue to follow closely for s/sx of nephrotoxicity, infusion reactions and appropriateness of therapy.  BMP and CBC will be ordered per protocol. We will continue to follow the patient’s culture results and clinical progress daily.    Gini Nuno, Pharmacist

## 2024-05-12 NOTE — ASSESSMENT & PLAN NOTE
Secondary to UTI.  At baseline, patient is mostly nonverbal, only says few word, does not carry conversation. Presented with increased sleepiness/drowsiness.  On my exam, she is awake, alert. Makes eye contact, does not answer any question. Appearing almost back to baseline.  Speech appreciated. Holding vitamins, statins for now.

## 2024-05-12 NOTE — CONSULTS
Consultation - Infectious Disease   Terrie Alicea 63 y.o. female MRN: [id number]  Unit/Bed#: W -01 Encounter: [id number]      IMPRESSION & RECOMMENDATIONS:   Impression/Recommendations:  This is a 63 y.o. female, with severe intellectual disability, was sent from [facility] ER on 5/10 with decreased responsiveness, fever and hypotension.  Patient is not improved with IV ceftriaxone for presumptive UTI.    1.  Severe sepsis, with hypotension.  Source was initially assumed to be UTI, given abnormal UA and urine culture with growth of Klebsiella.  However, patient is not improved on IV ceftriaxone, although Klebsiella is susceptible to it.  Unfortunately, due to patient not being communicative, it is difficult to determine source of infection.  Although patient is on level O2 supplement, she has no respiratory symptoms and portable CXR is without consolidation.  Clinically, given lack of response to ceftriaxone, patient clearly does not have uncomplicated UTI.  Patient should get abdomen/pelvis CT to assess for possible urinary tract obstruction or any intra-abdominal source.  She is also get chest CT to assess for pneumonia.  For now, can continue broad-spectrum antibiotic.  Admission blood cultures have no real growth thus far (see below).  Continue vancomycin/cefepime for now.  Monitor temperature/WBC.  Monitor hemodynamics.  Recommend checking CT of chest/abdomen/pelvis.    2.  Possible UTI, with urine culture growing Klebsiella.  As in above, with Inability to obtain history, it is unclear whether this is bladder colonization or UTI.  Also, as in above, given lack response to ceftriaxone, which Klebsiella is susceptible to, this is clearly not an uncomplicated UTI.  Concern for possible urinary tract obstruction, as in above.  Antibiotic plan as in above.  Recommendation for CT as above.    3.  Coagulase-negative Staphylococcus bacteremia, with growth in only 1 out of 2 admission blood cultures.  This is  most likely contaminant blood draw.  No antibiotic needed for this indication.    4.  Encephalopathy, likely metabolic, secondary to sepsis.  Mental status is improved, with patient more awake and alert, although she is still not communicative.  Monitor mental status.    5.  SCAR.  Creatinine is improved.  Antibiotic at full dose.  Monitor creatinine.    6.  DM, with recently controlled hemoglobin A1c.  Management per primary service.    7.  Severe intellectual disability, with chronic encephalopathy.    Prior records reviewed in detail.  Discussed with patient's caregiver, who is present at bedside.  Discussed with Madina Spence PA-C from [facility] service regarding need for further workup of sepsis not responding to ceftriaxone with chest/abdomen/pelvis CT.  She is in agreement and will order CT.    Thank you for this consultation.  We will follow along with you.    HISTORY OF PRESENT ILLNESS:  Reason for Consult: Severe sepsis.    HPI: Terrie Alicea is a 63 y.o. female, with severe intellectual disability, was sent from her group home to ER on 5/10 when patient was noted to be minimally responsive and curled up into a ball.  She was also febrile and hypotensive.  Patient was admitted and started on IV ceftriaxone for presumptive sepsis secondary to UTI.  Urine culture subsequently grew Klebsiella susceptible to ceftriaxone.  Patient's hypotension improved.  However, over the last 24 hours, she is hypotensive again.  Antibiotic regimen was broadened to vancomycin/cefepime.  We are asked to evaluate the patient.    At present, patient is awake alert but is really noncommunicative.  She appears to be comfortable.  Patient's caregiver is at bedside.  She states the patient can sometimes says a few words but not reliable.    REVIEW OF SYSTEMS:  A complete system-based review was attempted but unobtainable due to patient's noncommunicative state.    PAST MEDICAL HISTORY:  Past Medical History:   Diagnosis Date     Adjustment disorder     Altered mental status 12/11/2015    Anemia     Bipolar 1 disorder (HCC)     Cerebral palsy (HCC)     Chronic hypernatremia 02/06/2016    Closed fracture of left hip (HCC) 01/19/2016    Closed left hip fracture (HCC)     no surgery    Constipation     Dehydration 02/20/2016    Developmental delay     Diabetes mellitus (HCC)     Difficulty walking     Disease of thyroid gland     Diverticulosis     Dysphagia     Worsening    Fracture of multiple ribs of right side     Herpes zoster without complication 06/02/2021    Hip fracture ([facility]) 07/26/2015    left    Hyperlipidemia     Hypernatremia 12/28/2018    Hypotension     Impulse control disorder     Incontinence     Intellectual disability due to developmental disorder, unspecified     Microalbuminuria     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)     Osteopathia     Osteoporosis     Peripheral neuropathy     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HCC)     Thrombocytopenia ([facility]) 07/31/2015     Past Surgical History:   Procedure Laterality Date    ABDOMINAL SURGERY      COLECTOMY MIN      re-anastomosis 7/22/16    COLON SURGERY  1/31/16    COLONOSCOPY N/A 07/21/2016    Procedure: COLONOSCOPY;  Surgeon: Rosalino Jacome MD;  Location: [facility];  Service:     COLONOSCOPY N/A 01/31/2016    Procedure: COLONOSCOPY;  Surgeon: Rosalino Jacome MD;  Location[facility];  Service:     COLONOSCOPY N/A 07/25/2017    Procedure: COLONOSCOPY;  Surgeon: Rosalino Jacome MD;  Location: [facility];  Service: Colorectal    COLOSTOMY      EXPLORATORY LAPAROTOMY W/ BOWEL RESECTION N/A 01/31/2016    Procedure: exploratory laparotomy, left sigmoidectomy, coloproctostomy, take down splenic flexure, loop colostomy;  Surgeon: Rosalino Jacome MD;  Location: [facility];  Service:     OK CLOSURE ENTEROSTOMY LG/SMALL INTESTINE N/A 07/22/2016    Procedure: SEGMENTAL COLECTOMY WITH COLOCOLOSTOMY;  Surgeon: Rosalino Jacome MD;  Location: [facility];  Service: Colorectal    UPPER GASTROINTESTINAL  ENDOSCOPY       Problem list reviewed.    FAMILY HISTORY:  Non-contributory    SOCIAL HISTORY:  Social History     Substance and Sexual Activity   Alcohol Use Not Currently     Social History     Substance and Sexual Activity   Drug Use No     Social History     Tobacco Use   Smoking Status Never   Smokeless Tobacco Never       ALLERGIES:  Allergies   Allergen Reactions    Keflex [Cephalexin] GI Intolerance       MEDICATIONS:  All current active medications have been reviewed.  Patient is currently on cefepime/vancomycin.    PHYSICAL EXAM:  Vitals:  Temp:  [98.2 °F (36.8 °C)-103 °F (39.4 °C)] 98.2 °F (36.8 °C)  HR:  [] 65  Resp:  [18-32] 20  BP: ()/(43-91) 104/54  SpO2:  [89 %-97 %] 97 %  Temp (24hrs), Av.7 °F (38.2 °C), Min:98.2 °F (36.8 °C), Max:103 °F (39.4 °C)  Current: Temperature: 98.2 °F (36.8 °C)     Physical Exam:  General: Acute and chronic ill-appearing, nontoxic, in no acute distress. Awake, alert but noncommunicative.  Eyes:  Conjunctive clear with no hemorrhages or effusions  Oropharynx:  No ulcers, no lesions, pharynx benign, no tonsillitis  Neck:  Supple, no lymphadenopathy, no mass, nontender  Lungs: Decreased breath sounds, no rales, no wheezing, no accessory muscle use  Cardiac:  Regular rate and rhythm, normal S1, normal S2, no murmurs  Abdomen:  Soft, nondistended, non-tender, no HSM  Extremities:  No edema, no erythema, nontender. No ulcers  Skin:  No rashes, no ulcers  Neurological: Difficult to assess    LABS, IMAGING, & OTHER STUDIES:  Lab Results:  I have personally reviewed pertinent labs.  Results from last 7 days   Lab Units 24  0538 24  0512 05/10/24  1002   POTASSIUM mmol/L 4.1 4.4 5.3   CHLORIDE mmol/L 102 107 100   CO2 mmol/L 21 32 27   BUN mg/dL 11 14 25   CREATININE mg/dL 0.92 0.90 1.20   EGFR ml/min/1.73sq m 67 69 48   CALCIUM mg/dL 8.8 8.0* 9.5   AST U/L  --   --  20   ALT U/L  --   --  3*   ALK PHOS U/L  --   --  32*     Results from last 7 days    Lab Units 05/12/24  0538 05/11/24  0512 05/10/24  1612 05/10/24  1002   WBC Thousand/uL 8.21 5.64  --  8.85   HEMOGLOBIN g/dL 11.3* 10.0*  --  11.0*   PLATELETS Thousands/uL 108* 127* 120* 127*     Results from last 7 days   Lab Units 05/12/24  0540 05/10/24  1102 05/10/24  1002   BLOOD CULTURE  Received in Microbiology Lab. Culture in Progress.  Received in Microbiology Lab. Culture in Progress.  --  Staphylococcus species*  No Growth at 24 hrs.   GRAM STAIN RESULT   --   --  Gram positive cocci in clusters*   URINE CULTURE   --  >100,000 cfu/ml Klebsiella pneumoniae*  --        Imaging Studies:   I have personally reviewed pertinent imaging study reports and images in PACS.  Portable CXR reviewed personally.  Based atelectasis.  No consolidation.    EKG, Pathology, and Other Studies:   I have personally reviewed pertinent reports.

## 2024-05-12 NOTE — UTILIZATION REVIEW
Initial Clinical Review    Admission: Date/Time/Statement:   Admission Orders (From admission, onward)       Ordered        05/10/24 1247  INPATIENT ADMISSION  Once                          Orders Placed This Encounter   Procedures    INPATIENT ADMISSION     Standing Status:   Standing     Number of Occurrences:   1     Order Specific Question:   Level of Care     Answer:   Med Surg [16]     Order Specific Question:   Estimated length of stay     Answer:   More than 2 Midnights     Order Specific Question:   Certification     Answer:   I certify that inpatient services are medically necessary for this patient for a duration of greater than two midnights. See H&P and MD Progress Notes for additional information about the patient's course of treatment.     ED Arrival Information       Expected   -    Arrival   5/10/2024 09:10    Acuity   Emergent              Means of arrival   Ambulance    Escorted by   [facility]    Service   Hospitalist    Admission type   Emergency              Arrival complaint   ems             Chief Complaint   Patient presents with    Altered Mental Status     From a group home, found this morning to be lethargic with temp of 101 got tylenol around 0600. PH  pressures in high 80s       Initial Presentation: 63 y.o. female with hx severe intellectual disability,cerebral palsy,  hypothyroidism, GERD,  UTIs with E. coli and Klebsiella who presents to ED via EMS from group home with hange in mental status/lethargy. This morning the patient was minimally responsive and curled up in a ball.  She was hypotensive and her temperature was 101.1 °F tympanic at group home  and was given Tylenol.  The patient was nonverbal at that time..Of note at baseline the patient can say a few words or grunts. On exam, pt non verbal. Moves head to voice , BP 97/55 .  Labs LA 7.6-->6.1. U/A with 30-50 WBC . Creat 1.20 CXR shows nothing acute . Pt given 3 L IVF, IV abx in ED. Pt admitted as Inpatient  with sepsis 2/2  UTI, metabolic encephalopathy.SCAR . Lactic acidosis  Plan- IV Rocephin, F/U blood and urine cx . IVF. Trend creat . Monitor mentation . Trend procal and lactic acid.   SLP- bedside swallow eval . Baseline oral/pharyngeal dysphagia with high risk for aspiration. Pt has been on puree and HTL by tsp since 2020.  Recommend  puree/level 1 diet and honey thick liquids by tsp .    Anticipated Length of Stay/Certification Statement:  Patient will be admitted on an inpatient basis with an anticipated length of stay of greater than 2 midnights secondary to sepsis due to urinary tract infection.     Date: 5/11   Day 2:    Pt  is awake, alert. Makes eye contact, does not answer any question. Appearing almost back to baseline. Pt cooperative w/ meds via applesauce. Lactic acidosis, SCAR resolved . Pt continues on IV abx . Temp 100.4 F this am . Diet advanced per SLP recommendations .         Date: 5/12   Day 3: Has surpassed a 2nd midnight with active treatments and services.  Pt febrile 103 F this am .Tachycardic, tachypneic. Hypotensive .Given 1 L IVF bolus. Restart aggressive maintenance fluids .  O2 @ 2 L applied for desat 84 % on RA this am . Crackles heard over right lung base, breathing does not appear labored  . Add lactic acid to BMP, CBC, procal  . Continue ceftriaxone and vancomycin Repeat blood cultures -- previous set drawn > 24 hours ago and 1/2 with GPC clusters (prelim with coag negative staph). Suspect contamination.  Urine cx >100,100 klebsiella . CXR ordered- concerning for PNA . CT C/A/P . ID consult placed      ID consult- Severe sepsis, with hypotension. Source was initially assumed to be UTI, given abnormal UA and urine culture with growth of Klebsiella. However, patient is not improved on IV ceftriaxone, although Klebsiella is susceptible to it. Patient should get abdomen/pelvis CT to assess for possible urinary tract obstruction or any intra-abdominal source. She is also get chest CT to  assess for pneumonia. For now ,continue vancomycin, start cefepime. D/C ceftriaxone .  Coagulase-negative Staphylococcus bacteremia, with growth in only 1 out of 2 admission blood cultures. This is most likely contaminant blood draw.     SLP - .Pt  continues to be at risk for aspiration PNA patient made NPO last night due to increased wet breath sounds, fever, and change in blood pressure. Chest XR completed 5/12/24 with minimal right base atelectasis versus early infiltrate.  Recommend puree diet with pudding thick liquids by tsp .       ED Triage Vitals   Temperature Pulse Respirations Blood Pressure SpO2   05/10/24 0918 05/10/24 0913 05/10/24 0920 05/10/24 0923 05/10/24 0913   99 °F (37.2 °C) 87 20 97/55 94 %      Temp Source Heart Rate Source Patient Position - Orthostatic VS BP Location FiO2 (%)   05/10/24 0918 05/10/24 0913 05/10/24 1315 05/10/24 1315 --   Rectal Monitor Sitting Right arm       Pain Score       05/12/24 0358       Med Not Given for Pain - for MAR use only          Wt Readings from Last 1 Encounters:   05/12/24 47.2 kg (104 lb 0.9 oz)     Additional Vital Signs:   Date/Time Temp Pulse Resp BP MAP (mmHg) SpO2 Calculated FIO2 (%) - Nasal Cannula Nasal Cannula O2 Flow Rate (L/min) O2 Device   05/12/24 15:25:15 98.7 °F (37.1 °C) 73 -- 117/58 78 97 % -- -- --   05/12/24 15:24:39 98.7 °F (37.1 °C) 74 -- 117/58 78 94 % -- -- --   05/12/24 12:43:31 98.2 °F (36.8 °C) 65 -- 104/54 71 97 % -- -- --   05/12/24 1018 -- -- -- 80/50 Abnormal  -- -- -- -- --   05/12/24 10:12:28 -- 66 -- 71/43 Abnormal  52 Abnormal  97 % -- -- --   05/12/24 1008 99.4 °F (37.4 °C) -- 20 -- -- -- -- -- --   05/12/24 0716 102 °F (38.9 °C) Abnormal  -- 32 Abnormal  -- -- -- 28 2 L/min Nasal cannula   05/12/24 07:06:25 101.6 °F (38.7 °C) Abnormal  94 18 127/77 94 96 % -- -- --   05/12/24 06:28:41 102.3 °F (39.1 °C) Abnormal  101 -- -- -- 96 % -- -- --   05/12/24 05:06:50 103 °F (39.4 °C) Abnormal  104 30 Abnormal  145/91 109 96 % --  -- --   05/12/24 0343 -- -- -- -- -- -- 28 2 L/min Nasal cannula   05/12/24 03:33:49 102.7 °F (39.3 °C) Abnormal  106 Abnormal  -- -- -- 90 % -- -- --   05/11/24 2216 99.8 °F (37.7 °C) -- 18 122/60 -- -- -- -- None (Room air)   05/11/24 21:04:27 99.6 °F (37.6 °C) 74 -- -- -- 90 % -- -- --   05/11/24 16:29:13 99.4 °F (37.4 °C) 75 -- 120/65 83 89 % Abnormal  -- -- --   05/11/24 15:22:28 100.3 °F (37.9 °C) 70 -- 125/58 80 90 % -- -- --   05/11/24 15:19:49 100 °F (37.8 °C) 68 -- 89/61 Abnormal  70 89 % Abnormal  -- -- --   05/11/24 07:07:28 97.3 °F (36.3 °C) Abnormal  75 17 112/57 75 95 % -- -- --   05/11/24 05:17:27 100.4 °F (38 °C) 75 18 -- -- 87 % Abnormal   -- -- --    SpO2: at one point while in room getting her labworks she dipped to 84% on r/a at 05/11/24 0517   O2 Device: no obvious SOB and no cough noted, placed on o2 at 2L at 05/11/24 0517   05/11/24 0500 -- -- -- -- -- -- -- -- None (Room air)   05/11/24 0300 -- -- -- -- -- -- -- -- None (Room air)   05/11/24 00:02:34 99.1 °F (37.3 °C) 73 18 99/52 68 91 % -- -- None (Room air)   05/10/24 2356 -- -- -- -- -- -- -- -- None (Room air)   05/10/24 1828 -- -- -- -- -- -- -- -- None (Room air)   05/10/24 18:19:49 97.9 °F (36.6 °C) 76 -- 91/67 75 92 % -- -- --   05/10/24 1315 -- 67 18 103/55 74 95 % -- -- None (Room air)   05/10/24 1215 -- 65 -- 112/74 89 94 % -- -- --   05/10/24 1030 -- 70 18 93/56 70 96 % -- -- None (Room air)   05/10/24 1015 -- 66 18 96/59 72 95 % -- -- None (Room air)   05/10/24 1000 -- 72 18 98/56 70 95 % -- -- None (Room air)   05/10/24 0930 -- 69 18 97/55 72 94 % -- -- None (Room air)   05/10/24 0923 -- -- -- 97/55 -- -- -- -- --     Pertinent Labs/Diagnostic Test Results:    5/10    ECG-NSR   XR chest portable   Final Result by Devin Comer MD (05/12 1040)      Minimal right base atelectasis versus early infiltrate. Lungs otherwise appear clear.            Workstation performed: [id number]         XR chest portable   Final Result by Funmilayo  Liseth Martinez MD (05/10 1802)      Mild opacity in the right midlung which could be due to atelectasis. Early pneumonia not excluded in the appropriate clinical setting.            Workstation performed: [id number]         CT chest abdomen pelvis wo contrast    (Results Pending)     Results from last 7 days   Lab Units 05/10/24  1003   SARS-COV-2  Negative     Results from last 7 days   Lab Units 05/12/24  0538 05/11/24  0512 05/10/24  1612 05/10/24  1002   WBC Thousand/uL 8.21 5.64  --  8.85   HEMOGLOBIN g/dL 11.3* 10.0*  --  11.0*   HEMATOCRIT % 35.9 30.9*  --  35.2   PLATELETS Thousands/uL 108* 127* 120* 127*   TOTAL NEUT ABS Thousands/µL  --  2.37  --  5.69         Results from last 7 days   Lab Units 05/12/24  0538 05/11/24  0512 05/10/24  1002   SODIUM mmol/L 140 145 140   POTASSIUM mmol/L 4.1 4.4 5.3   CHLORIDE mmol/L 102 107 100   CO2 mmol/L 21 32 27   ANION GAP mmol/L 17* 6 13   BUN mg/dL 11 14 25   CREATININE mg/dL 0.92 0.90 1.20   EGFR ml/min/1.73sq m 67 69 48   CALCIUM mg/dL 8.8 8.0* 9.5     Results from last 7 days   Lab Units 05/10/24  1002   AST U/L 20   ALT U/L 3*   ALK PHOS U/L 32*   TOTAL PROTEIN g/dL 6.9   ALBUMIN g/dL 3.6   TOTAL BILIRUBIN mg/dL 0.29     Results from last 7 days   Lab Units 05/12/24  1615 05/12/24  1203 05/12/24  0745 05/12/24  0644 05/12/24  0023 05/11/24  1628 05/11/24  1049 05/11/24  0709 05/11/24  0035 05/10/24  2104 05/10/24  1919   POC GLUCOSE mg/dl 119 197* 255* 247* 128 121 122 107 96 90 99     Results from last 7 days   Lab Units 05/12/24  0538 05/11/24  0512 05/10/24  1002   GLUCOSE RANDOM mg/dL 241* 92 208*         Results from last 7 days   Lab Units 05/10/24  1002   HEMOGLOBIN A1C % 6.3*   EAG mg/dl 134         Results from last 7 days   Lab Units 05/10/24  1612 05/10/24  1310 05/10/24  1002   HS TNI 0HR ng/L  --   --  6   HS TNI 2HR ng/L  --  5  --    HSTNI D2 ng/L  --  -1  --    HS TNI 4HR ng/L 5  --   --    HSTNI D4 ng/L -1  --   --          Results from last 7  days   Lab Units 05/10/24  1002   PROTIME seconds 12.3   INR  0.87   PTT seconds 21*         Results from last 7 days   Lab Units 05/12/24  0538 05/11/24  0512 05/10/24  1612 05/10/24  1002   PROCALCITONIN ng/ml 0.06 0.10 0.06 <0.05     Results from last 7 days   Lab Units 05/12/24  0538 05/11/24  0512 05/10/24  1925 05/10/24  1612 05/10/24  1310 05/10/24  1002   LACTIC ACID mmol/L 1.2 0.8 3.6* 4.2* 6.1* 7.6*           Results from last 7 days   Lab Units 05/10/24  1102   CLARITY UA  Slightly Cloudy   COLOR UA  Light Yellow   SPEC GRAV UA  1.015   PH UA  6.0   GLUCOSE UA mg/dl 250 (1/4%)*   KETONES UA mg/dl 15 (1+)*   BLOOD UA  Negative   PROTEIN UA mg/dl Negative   NITRITE UA  Negative   BILIRUBIN UA  Negative   UROBILINOGEN UA E.U./dl 0.2   LEUKOCYTES UA  Small*   WBC UA /hpf 30-50*   RBC UA /hpf 1-2   BACTERIA UA /hpf Moderate*   EPITHELIAL CELLS WET PREP /hpf None Seen     Results from last 7 days   Lab Units 05/10/24  1003   INFLUENZA A PCR  Negative   INFLUENZA B PCR  Negative   RSV PCR  Negative                             Results from last 7 days   Lab Units 05/12/24  0540 05/10/24  1102 05/10/24  1002   BLOOD CULTURE  Received in Microbiology Lab. Culture in Progress.  Received in Microbiology Lab. Culture in Progress.  --  No Growth at 48 hrs.  Staphylococcus species*   GRAM STAIN RESULT   --   --  Gram positive cocci in clusters*   URINE CULTURE   --  >100,000 cfu/ml Klebsiella pneumoniae*  --          ED Treatment:   Medication Administration from 05/10/2024 0910 to 05/10/2024 1808         Date/Time Order Dose Route Action     05/10/2024 1033 EDT multi-electrolyte (PLASMALYTE-A/ISOLYTE-S PH 7.4) IV solution 1,000 mL 0 mL Intravenous Stopped     05/10/2024 1003 EDT multi-electrolyte (PLASMALYTE-A/ISOLYTE-S PH 7.4) IV solution 1,000 mL 1,000 mL Intravenous New Bag     05/10/2024 1259 EDT multi-electrolyte (PLASMALYTE-A/ISOLYTE-S PH 7.4) IV solution 1,000 mL 0 mL Intravenous Stopped     05/10/2024 6672  EDT multi-electrolyte (PLASMALYTE-A/ISOLYTE-S PH 7.4) IV solution 1,000 mL 1,000 mL Intravenous New Bag     05/10/2024 1156 EDT ceftriaxone (ROCEPHIN) 1 g/50 mL in dextrose IVPB 0 mg Intravenous Stopped     05/10/2024 1118 EDT ceftriaxone (ROCEPHIN) 1 g/50 mL in dextrose IVPB 1,000 mg Intravenous New Bag     05/10/2024 1422 EDT multi-electrolyte (PLASMALYTE-A/ISOLYTE-S PH 7.4) IV solution 125 mL/hr Intravenous New Bag          Past Medical History:   Diagnosis Date    Adjustment disorder     Altered mental status 12/11/2015    Anemia     Bipolar 1 disorder ([facility])     Cerebral palsy ([facility])     Chronic hypernatremia 02/06/2016    Closed fracture of left hip ([facility]) 01/19/2016    Closed left hip fracture ([facility])     no surgery    Constipation     Dehydration 02/20/2016    Developmental delay     Diabetes mellitus ([facility])     Difficulty walking     Disease of thyroid gland     Diverticulosis     Dysphagia     Worsening    Fracture of multiple ribs of right side     Herpes zoster without complication 06/02/2021    Hip fracture ([facility]) 07/26/2015    left    Hyperlipidemia     Hypernatremia 12/28/2018    Hypotension     Impulse control disorder     Incontinence     Intellectual disability due to developmental disorder, unspecified     Microalbuminuria     Neuropathy in diabetes ([facility])     Osteopathia     Osteoporosis     Peripheral neuropathy     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[facility])     Thrombocytopenia ([facility]) 07/31/2015     Present on Admission:   Sepsis due to gram-negative UTI  ([facility])   Type 2 diabetes mellitus, without long-term current use of insulin ([facility])   Severe Intellectual disability   Acute metabolic encephalopathy   Acute kidney injury ([facility])   Lactic acidosis   Hypothyroidism   Gastroesophageal reflux disease   Bipolar disorder ([facility])   Mixed cerebral palsy ([facility])      Admitting Diagnosis: Underweight [R63.6]  UTI (urinary tract infection) [N39.0]  SCAR (acute kidney injury) ([facility]) [N17.9]  Acute encephalopathy [G93.40]  Severe sepsis ([facility])  [A41.9, R65.20]  Age/Sex: 63 y.o. female  Admission Orders:  Scheduled Medications:  carbidopa-levodopa, 2 tablet, Oral, TID  cefepime, 2,000 mg, Intravenous, Q12H  Start: 05/12/24 1100   citalopram, 20 mg, Oral, Daily  divalproex sodium, 375 mg, Oral, TID  enoxaparin, 40 mg, Subcutaneous, Daily  insulin lispro, 1-5 Units, Subcutaneous, TID AC  levothyroxine, 25 mcg, Oral, Early Morning  Valbenazine Tosylate, 40 mg, Oral, Daily  vancomycin, 1,000 mg, Intravenous, Q24H    sodium chloride 0.9 % bolus 1,000 mL  Dose: 1,000 mL  Freq: Once Route: IV  x1 5/12 @ 1109   ceftriaxone (ROCEPHIN) 1 g/50 mL in dextrose IVPB  Dose: 1,000 mg  Freq: Every 24 hours Route: IV  Last Dose: 1,000 mg (05/11/24 1035)  Start: 05/11/24 1100 End: 05/12/24 1032   acetaminophen (TYLENOL) rectal suppository 650 mg  Dose: 650 mg  Freq: Once Route: RE  x1 5/12 @ 0837     Continuous IV Infusions:  multi-electrolyte, 125 mL/hr, Intravenous, Continuous  Start: 05/12/24 0800     multi-electrolyte (PLASMALYTE-A/ISOLYTE-S PH 7.4) IV solution  Rate: 125 mL/hr Dose: 125 mL/hr  Freq: Continuous Route: IV  Indications of Use: IV Resuscitation  Last Dose: Stopped (05/11/24 1400)  Start: 05/10/24 1400 End: 05/11/24 1127   PRN Meds:  acetaminophen, 325 mg, Rectal, Q4H PRN x1 5/12   bisacodyl, 10 mg, Rectal, Daily PRN    Dysphagia 1 purred diet, pudding thick liquids   OOB as edda w/ assist     IP CONSULT TO NUTRITION SERVICES  IP CONSULT TO PHARMACY  IP CONSULT TO INFECTIOUS DISEASES    Network Utilization Review Department  ATTENTION: Please call with any questions or concerns to [phone] and carefully listen to the prompts so that you are directed to the right person. All voicemails are confidential.   For Discharge needs, contact Care Management DC Support Team at [phone] opt. 2  Send all requests for admission clinical reviews, approved or denied determinations and any other requests to dedicated fax number below belonging to the campus where the  patient is receiving treatment. List of dedicated fax numbers for the Facilities:  FACILITY NAME UR FAX NUMBER   ADMISSION DENIALS (Administrative/Medical Necessity) [phone]   DISCHARGE SUPPORT TEAM (NETWORK) [phone]   PARENT CHILD HEALTH (Maternity/NICU/Pediatrics) [phone]   [facility] [phone]   [facility] [phone]   [facility] [phone]   [facility] [phone]   [facility] [phone]   General acute hospital [phone]   [facility] [phone]   [facility] [phone]   [facility] [phone]   [facility] [phone]   [facility] [phone]   [facility] [phone]

## 2024-05-12 NOTE — NURSING NOTE
Pt noted to have wet cough and drooling. Lung sounds + crackles. Attempted suction, pt non-compliant (biting [place], moving head). Temp slightly elevated at 100.3, HR 70, o2 90%, /58. Covering physician David made aware. Made strict NPO at this time. Held scheduled medication.

## 2024-05-13 PROBLEM — N17.9 ACUTE KIDNEY INJURY (HCC): Status: RESOLVED | Noted: 2024-05-10 | Resolved: 2024-05-13

## 2024-05-13 PROBLEM — D64.9 ANEMIA: Status: ACTIVE | Noted: 2020-11-03

## 2024-05-13 PROBLEM — E87.20 LACTIC ACIDOSIS: Status: RESOLVED | Noted: 2019-04-08 | Resolved: 2024-05-13

## 2024-05-13 PROBLEM — R65.10 SIRS (SYSTEMIC INFLAMMATORY RESPONSE SYNDROME) (HCC): Status: ACTIVE | Noted: 2024-05-13

## 2024-05-13 LAB
ALBUMIN SERPL BCP-MCNC: 3.2 G/DL (ref 3.5–5)
ALP SERPL-CCNC: 28 U/L (ref 34–104)
ALT SERPL W P-5'-P-CCNC: <3 U/L (ref 7–52)
ANION GAP SERPL CALCULATED.3IONS-SCNC: 11 MMOL/L (ref 4–13)
AST SERPL W P-5'-P-CCNC: 18 U/L (ref 13–39)
BACTERIA BLD CULT: ABNORMAL
BASOPHILS # BLD AUTO: 0.05 THOUSANDS/ÂΜL (ref 0–0.1)
BASOPHILS NFR BLD AUTO: 1 % (ref 0–1)
BILIRUB SERPL-MCNC: 0.3 MG/DL (ref 0.2–1)
BUN SERPL-MCNC: 10 MG/DL (ref 5–25)
CALCIUM ALBUM COR SERPL-MCNC: 8.6 MG/DL (ref 8.3–10.1)
CALCIUM SERPL-MCNC: 8 MG/DL (ref 8.4–10.2)
CHLORIDE SERPL-SCNC: 108 MMOL/L (ref 96–108)
CO2 SERPL-SCNC: 24 MMOL/L (ref 21–32)
CREAT SERPL-MCNC: 0.84 MG/DL (ref 0.6–1.3)
EOSINOPHIL # BLD AUTO: 0.02 THOUSAND/ÂΜL (ref 0–0.61)
EOSINOPHIL NFR BLD AUTO: 0 % (ref 0–6)
ERYTHROCYTE [DISTWIDTH] IN BLOOD BY AUTOMATED COUNT: 13.5 % (ref 11.6–15.1)
GFR SERPL CREATININE-BSD FRML MDRD: 75 ML/MIN/1.73SQ M
GLUCOSE SERPL-MCNC: 166 MG/DL (ref 65–140)
GLUCOSE SERPL-MCNC: 167 MG/DL (ref 65–140)
GLUCOSE SERPL-MCNC: 170 MG/DL (ref 65–140)
GLUCOSE SERPL-MCNC: 202 MG/DL (ref 65–140)
GLUCOSE SERPL-MCNC: 215 MG/DL (ref 65–140)
GLUCOSE SERPL-MCNC: 244 MG/DL (ref 65–140)
GRAM STN SPEC: ABNORMAL
HCT VFR BLD AUTO: 30.7 % (ref 34.8–46.1)
HGB BLD-MCNC: 9.7 G/DL (ref 11.5–15.4)
IMM GRANULOCYTES # BLD AUTO: 0.03 THOUSAND/UL (ref 0–0.2)
IMM GRANULOCYTES NFR BLD AUTO: 0 % (ref 0–2)
LYMPHOCYTES # BLD AUTO: 2.01 THOUSANDS/ÂΜL (ref 0.6–4.47)
LYMPHOCYTES NFR BLD AUTO: 26 % (ref 14–44)
MCH RBC QN AUTO: 33 PG (ref 26.8–34.3)
MCHC RBC AUTO-ENTMCNC: 31.6 G/DL (ref 31.4–37.4)
MCV RBC AUTO: 104 FL (ref 82–98)
MONOCYTES # BLD AUTO: 0.69 THOUSAND/ÂΜL (ref 0.17–1.22)
MONOCYTES NFR BLD AUTO: 9 % (ref 4–12)
NEUTROPHILS # BLD AUTO: 5 THOUSANDS/ÂΜL (ref 1.85–7.62)
NEUTS SEG NFR BLD AUTO: 64 % (ref 43–75)
NRBC BLD AUTO-RTO: 0 /100 WBCS
PLATELET # BLD AUTO: 116 THOUSANDS/UL (ref 149–390)
PMV BLD AUTO: 12.6 FL (ref 8.9–12.7)
POTASSIUM SERPL-SCNC: 4.3 MMOL/L (ref 3.5–5.3)
PROCALCITONIN SERPL-MCNC: 0.14 NG/ML
PROT SERPL-MCNC: 6 G/DL (ref 6.4–8.4)
RBC # BLD AUTO: 2.94 MILLION/UL (ref 3.81–5.12)
S AUREUS+CONS DNA BLD POS NAA+NON-PROBE: DETECTED
SODIUM SERPL-SCNC: 143 MMOL/L (ref 135–147)
VANCOMYCIN SERPL-MCNC: 15.9 UG/ML (ref 10–20)
WBC # BLD AUTO: 7.8 THOUSAND/UL (ref 4.31–10.16)

## 2024-05-13 PROCEDURE — 85025 COMPLETE CBC W/AUTO DIFF WBC: CPT | Performed by: PHYSICIAN ASSISTANT

## 2024-05-13 PROCEDURE — 82948 REAGENT STRIP/BLOOD GLUCOSE: CPT

## 2024-05-13 PROCEDURE — 80053 COMPREHEN METABOLIC PANEL: CPT | Performed by: PHYSICIAN ASSISTANT

## 2024-05-13 PROCEDURE — 80202 ASSAY OF VANCOMYCIN: CPT | Performed by: INTERNAL MEDICINE

## 2024-05-13 PROCEDURE — 99233 SBSQ HOSP IP/OBS HIGH 50: CPT | Performed by: STUDENT IN AN ORGANIZED HEALTH CARE EDUCATION/TRAINING PROGRAM

## 2024-05-13 PROCEDURE — 99232 SBSQ HOSP IP/OBS MODERATE 35: CPT | Performed by: PHYSICIAN ASSISTANT

## 2024-05-13 PROCEDURE — 84145 PROCALCITONIN (PCT): CPT | Performed by: PHYSICIAN ASSISTANT

## 2024-05-13 RX ORDER — AMOXICILLIN 250 MG
1 CAPSULE ORAL 2 TIMES DAILY
Status: DISCONTINUED | OUTPATIENT
Start: 2024-05-13 | End: 2024-05-15 | Stop reason: HOSPADM

## 2024-05-13 RX ADMIN — SODIUM CHLORIDE, SODIUM GLUCONATE, SODIUM ACETATE, POTASSIUM CHLORIDE, MAGNESIUM CHLORIDE, SODIUM PHOSPHATE, DIBASIC, AND POTASSIUM PHOSPHATE 125 ML/HR: .53; .5; .37; .037; .03; .012; .00082 INJECTION, SOLUTION INTRAVENOUS at 06:36

## 2024-05-13 RX ADMIN — SENNOSIDES, DOCUSATE SODIUM 1 TABLET: 8.6; 5 TABLET ORAL at 16:52

## 2024-05-13 RX ADMIN — INSULIN LISPRO 1 UNITS: 100 INJECTION, SOLUTION INTRAVENOUS; SUBCUTANEOUS at 07:43

## 2024-05-13 RX ADMIN — LEVOTHYROXINE SODIUM 25 MCG: 25 TABLET ORAL at 05:18

## 2024-05-13 RX ADMIN — INSULIN LISPRO 1 UNITS: 100 INJECTION, SOLUTION INTRAVENOUS; SUBCUTANEOUS at 11:48

## 2024-05-13 RX ADMIN — ENOXAPARIN SODIUM 40 MG: 40 INJECTION SUBCUTANEOUS at 08:54

## 2024-05-13 RX ADMIN — DIVALPROEX SODIUM 375 MG: 125 CAPSULE, COATED PELLETS ORAL at 16:44

## 2024-05-13 RX ADMIN — CARBIDOPA AND LEVODOPA 2 TABLET: 25; 100 TABLET ORAL at 16:44

## 2024-05-13 RX ADMIN — DIVALPROEX SODIUM 375 MG: 125 CAPSULE, COATED PELLETS ORAL at 20:52

## 2024-05-13 RX ADMIN — CARBIDOPA AND LEVODOPA 2 TABLET: 25; 100 TABLET ORAL at 08:52

## 2024-05-13 RX ADMIN — VANCOMYCIN HYDROCHLORIDE 1000 MG: 1 INJECTION, SOLUTION INTRAVENOUS at 07:43

## 2024-05-13 RX ADMIN — SODIUM CHLORIDE, SODIUM GLUCONATE, SODIUM ACETATE, POTASSIUM CHLORIDE, MAGNESIUM CHLORIDE, SODIUM PHOSPHATE, DIBASIC, AND POTASSIUM PHOSPHATE 125 ML/HR: .53; .5; .37; .037; .03; .012; .00082 INJECTION, SOLUTION INTRAVENOUS at 19:44

## 2024-05-13 RX ADMIN — SENNOSIDES, DOCUSATE SODIUM 1 TABLET: 8.6; 5 TABLET ORAL at 11:48

## 2024-05-13 RX ADMIN — CARBIDOPA AND LEVODOPA 2 TABLET: 25; 100 TABLET ORAL at 20:52

## 2024-05-13 RX ADMIN — CITALOPRAM HYDROBROMIDE 20 MG: 20 TABLET ORAL at 08:52

## 2024-05-13 RX ADMIN — INSULIN LISPRO 2 UNITS: 100 INJECTION, SOLUTION INTRAVENOUS; SUBCUTANEOUS at 16:44

## 2024-05-13 RX ADMIN — VALBENAZINE 40 MG: 40 CAPSULE ORAL at 08:52

## 2024-05-13 RX ADMIN — ACETAMINOPHEN 325 MG: 650 SUPPOSITORY RECTAL at 05:28

## 2024-05-13 RX ADMIN — DIVALPROEX SODIUM 375 MG: 125 CAPSULE, COATED PELLETS ORAL at 08:52

## 2024-05-13 NOTE — PROGRESS NOTES
Progress Note - Infectious Disease   Terrie Alicea 63 y.o. female MRN: [id number]  Unit/Bed#: W -01 Encounter: [id number]      Impression/Plan:  SIRS vs Sepsis. P/w hypotension, lactic acidosis, and new O2 requirement. Spiked fever to 103F. Source was initially presumed to be UTI, given abnormal UA and urine culture with growth of Klebsiella.  However, patient did not improve on IV ceftriaxone though the Klebsiella is susceptible to it. It also appears patient has previously grown K.pneumoniae from Ucx dating back to 2016, suggesting potential colonization.  Unfortunately, due to patient not being communicative, it is difficult to determine source of infection.  Although patient is on supplemental O2, she has no significant respiratory symptoms. CT chest unremarkable other than some debris in trachea. Consider aspiration.  CT A/P otherwise unremarkable without evidence of obstructive uropathy or intra-abdominal pathology. Still spiking fevers, though fever curve appears to be down-trending. Patient not taking in much by mouth. Consider dehydration which could have led to lactic acidosis and hypovolemia. Consider viral etiology of fevers given patient lives in a facility. Consider recurrent aspiration events. Patient remains otherwise stable, WBC WNL at 7.80. Repeat blood cultures NGTD. Favor stopping antibiotics and monitoring off given no clear bacterial infectious source identified.   Stop vancomycin/cefepime and monitor closely off antibiotics  Continue to monitor temperature/vitals  Continue to follow CBCD and BMP with AM labs to assess clinically  Serial examinations  If patient worsens clinically (worsening hypotension, spiking fevers, significant change in mental status, etc.) off antibiotics, can re-start, but favor monitoring off for now.   Additional supportive care per primary team   Aspiration precautions     2.  Concern for UTI, with urine culture growing Klebsiella.  As in above, with inability  to obtain history, it is unclear whether this is bladder colonization or UTI.  Per chart review, patient has grown K.pneumoniae from urine cultures back to 2016. Given she did not response to CTX despite it being sensitive, suspect this is a colonizer. CT A/P unremarkable for obstructive uropathy. Favor stopping antibiotics and monitoring off as above.  Monitor closely off antibiotics  Serial examinations     3.  Positive blood cultures. Admission blood cultures positive 1 out of 2 for Coagulase-negative Staphylococcus. This is most likely contaminant blood draw.  No antibiotic needed for this indication.     4.  Encephalopathy. At baseline, patient is mostly nonverbal. P/w increased sleepiness/drowsiness. Mental status is improved, with patient more awake and alert, although she is still not communicative.  Monitor mental status.     5.  SCAR.  Scr elevated to 1.2 on admission. Now, creatinine is improved to 0.84 with fluid administration.   Continue to trend Scr  Avoid nephrotoxins     6.  T2DM. Most recent A1C 6.3. Well controlled. Continue with tight glycemic control.   Management per primary service.     7.  Severe intellectual disability, with chronic encephalopathy.    Above plan was discussed in detail with patient at bedside.   Above plan was discussed in detail with primary service AP, who agrees with the plan to monitor off antibiotics at this time.    Antibiotics:  Vancomycin D3  Cefepime D2    Antibiotic D4    Subjective:  Patient nonverbal. Does not interact throughout examination.    Objective:  Vitals:  Temp:  [98.2 °F (36.8 °C)-100.6 °F (38.1 °C)] 100 °F (37.8 °C)  HR:  [65-96] 96  Resp:  [17-20] 17  BP: (104-149)/(54-83) 149/81  SpO2:  [92 %-97 %] 93 %  Temp (24hrs), Av.4 °F (37.4 °C), Min:98.2 °F (36.8 °C), Max:100.6 °F (38.1 °C)  Current: Temperature: 100 °F (37.8 °C)    Physical Exam:   General Appearance:  Chronically ill appearing, nonverbal, does not interact throughout examination, no  acute distress currently, awake.   Throat: No perioral lesions or ulcerations noted.    Lungs:   Decreased breath sounds bilaterally, no wheezes, rhonchi or rales; respirations unlabored. On 2L NC.    Heart:  RRR; no murmur, rub or gallop.   Abdomen:   Soft, non-tender, non-distended, positive bowel sounds.     Extremities: No clubbing or cyanosis, no edema.   Skin: No new rashes, lesions, or draining wounds noted on exposed skin.     Labs, Imaging, & Other studies:   All pertinent labs and imaging studies were personally reviewed  Results from last 7 days   Lab Units 05/13/24 0435 05/12/24  0538 05/11/24  0512   WBC Thousand/uL 7.80 8.21 5.64   HEMOGLOBIN g/dL 9.7* 11.3* 10.0*   PLATELETS Thousands/uL 116* 108* 127*     Results from last 7 days   Lab Units 05/13/24 0435 05/11/24  0512 05/10/24  1002   POTASSIUM mmol/L 4.3   < > 5.3   CHLORIDE mmol/L 108   < > 100   CO2 mmol/L 24   < > 27   BUN mg/dL 10   < > 25   CREATININE mg/dL 0.84   < > 1.20   EGFR ml/min/1.73sq m 75   < > 48   CALCIUM mg/dL 8.0*   < > 9.5   AST U/L 18  --  20   ALT U/L <3*  --  3*   ALK PHOS U/L 28*  --  32*    < > = values in this interval not displayed.     Results from last 7 days   Lab Units 05/12/24  0540 05/10/24  1102 05/10/24  1002   BLOOD CULTURE  Received in Microbiology Lab. Culture in Progress.  Received in Microbiology Lab. Culture in Progress.  --  Staphylococcus species*  No Growth at 48 hrs.   GRAM STAIN RESULT   --   --  Gram positive cocci in clusters*   URINE CULTURE   --  >100,000 cfu/ml Klebsiella pneumoniae*  --      Results from last 7 days   Lab Units 05/13/24  0435 05/12/24  0538 05/11/24  0512 05/10/24  1612 05/10/24  1002   PROCALCITONIN ng/ml 0.14 0.06 0.10 0.06 <0.05                     Imaging Studies:  I have personally reviewed pertinent imaging study reports and images in PACS.     5/12 CT Chest/A/P: dependent debris in the trachea in keep with mucus. Bibasilar atelectasis. Otherwise, lungs are clear. No  obstructive uropathy. No intra-abdominal pathology.       Indigo Caputo PA-C  [facility]

## 2024-05-13 NOTE — ASSESSMENT & PLAN NOTE
Patient with cerebral palsy, intellectual disability, mostly nonverbal at baseline, presented from group home with fever and increased drowsiness.    Met Sepsis criteria in ED with fever, lactic acidosis, hypoxia.   IV fluids initially discontinued after diet resumed however patient became severely septic again today and hypotensive.  Required 1L yesterday and was fluid-responsive, continue aggressive maintenance fluids  Urinalysis positive and urine culture growing greater than 100 K CFU per mL Klebsiella pneumonia  She has had UTIs with E. coli and Klebsiella in the past. Klebsiella and E. coli have been sensitive to cephalosporins in the past (December 2021 and August 2023)  Concerning or aspiration event overnight: fever continues to recur and chest x-ray is concerning for developing pneumonia  Initial blood cultures with 1 of 2 positive for gram-positive CC, likely contaminant. Repeat Blood Cx's neg at 24h  ID consulted, given that CT CAP is unremarkable and pt did not respond to CTX despite Klebs PNA on UCx being sensitive, they advise monitoring closely off abx  Acute metabolic encephalopathy and SCAR have resolved since admission  Monitor CBC and fever curve

## 2024-05-13 NOTE — ASSESSMENT & PLAN NOTE
Unclear etiology, possibly due to infection  Pt is now alert x2 days  At baseline, patient is mostly nonverbal, only says few word, does not carry conversation. Presented with increased sleepiness/drowsiness.  On my exam, she is awake, alert. Makes eye contact, does not answer any question. Appearing almost back to baseline.  Speech appreciated.

## 2024-05-13 NOTE — PROGRESS NOTES
Terrie Alicea is a 63 y.o. female who is currently ordered Vancomycin IV with management by the Pharmacy Consult service.  Relevant clinical data and objective / subjective history reviewed.  Vancomycin Assessment:  Indication and Goal AUC/Trough: Pneumonia (goal -600, trough >10)  Clinical Status: stable  Micro:     Renal Function:  SCr: 0.84 mg/dL  CrCl: 44.3 mL/min  Renal replacement: Not on dialysis  Days of Therapy: 3  Current Dose: 1g IV q24 hours  Vancomycin Plan:  New Dosing: continue current regimen  Estimated AUC: 547 mcg*hr/mL  Estimated Trough: 13.5 mcg/mL  Next Level: 05/20 at 0600  Renal Function Monitoring: Daily BMP and UOP  Pharmacy will continue to follow closely for s/sx of nephrotoxicity, infusion reactions and appropriateness of therapy.  BMP and CBC will be ordered per protocol. We will continue to follow the patient’s culture results and clinical progress daily.    Mary Jane Bailon, Pharmacist

## 2024-05-13 NOTE — UTILIZATION REVIEW
NOTIFICATION OF INPATIENT ADMISSION   AUTHORIZATION REQUEST   SERVICING FACILITY:   [place] [address]  Tax ID: [id number]  NPI: [id number]   ATTENDING PROVIDER:  Attending Name and NPI#: Bailey Ricardo Md [[id number]]  Address: [address] [place] [address]  Phone: [phone]     ADMISSION INFORMATION:  Place of Service: Inpatient [facility]  Place of Service Code: 21  Inpatient Admission Date/Time: 5/10/24 12:47 PM  Discharge Date/Time: No discharge date for patient encounter.  Admitting Diagnosis Code/Description:  Underweight [R63.6]  UTI (urinary tract infection) [N39.0]  SCAR (acute kidney injury) (HCC) [N17.9]  Acute encephalopathy [G93.40]  Severe sepsis (HCC) [A41.9, R65.20]     UTILIZATION REVIEW CONTACT:  Shyla Garcia Utilization   Network Utilization Review Department  Phone: [phone]  Fax: [phone]  Email: [email or web address]  Contact for approvals/pending authorizations, clinical reviews, and discharge.     PHYSICIAN ADVISORY SERVICES:  Medical Necessity Denial & Gotg-fd-Jhad Review  Phone: [phone]  Fax: [phone]  Email: [email or web address]     DISCHARGE SUPPORT TEAM:  For Patients Discharge Needs & Updates  Phone: [phone] opt. 2 Fax: [phone]  Email: [email or web address]

## 2024-05-13 NOTE — ASSESSMENT & PLAN NOTE
Hgb dropped 11.3 to 9.7 today, suspect due to hemodilution given aggressive fluid resuscitation yesterday  No evidence of bleeding  Repeat AM CBC

## 2024-05-13 NOTE — ASSESSMENT & PLAN NOTE
Lab Results   Component Value Date    HGBA1C 6.3 (H) 05/10/2024       Recent Labs     05/12/24 2034 05/13/24  0040 05/13/24  0740 05/13/24  1052   POCGLU 132 167* 202* 215*         Blood Sugar Average: Last 72 hrs:  (P) [id number]  -SSI/accuchecks

## 2024-05-13 NOTE — PROGRESS NOTES
[facility]  Progress Note  Name: Terrie Alicea I  MRN: [id number]  Unit/Bed#: W -01 I Date of Admission: 5/10/2024   Date of Service: 5/13/2024 I Hospital Day: 3    Assessment/Plan   Type 2 diabetes mellitus, without long-term current use of insulin ([facility])  Assessment & Plan  Lab Results   Component Value Date    HGBA1C 6.3 (H) 05/10/2024       Recent Labs     05/12/24  2034 05/13/24  0040 05/13/24  0740 05/13/24  1052   POCGLU 132 167* 202* 215*         Blood Sugar Average: Last 72 hrs:  (P) [id number]  -SSI/accuchecks    Bipolar disorder ([facility])  Assessment & Plan  Continue Citalopram, Depakote.   Holding trazodone, ativan and Abilify due to increased somnolence. May resume on discharge    Hypothyroidism  Assessment & Plan  -Continue Levoxyl    Mixed cerebral palsy ([facility])  Assessment & Plan  Noted patient is on Sinemet ER and velbenazine. Switched sinemet to IR due to unable to crush ER. Holding velbenazine until pharmacy verifies whether can be crushed.    SIRS (systemic inflammatory response syndrome) ([facility])  Assessment & Plan  Patient with cerebral palsy, intellectual disability, mostly nonverbal at baseline, presented from group home with fever and increased drowsiness.    Met Sepsis criteria in ED with fever, lactic acidosis, hypoxia.   IV fluids initially discontinued after diet resumed however patient became severely septic again today and hypotensive.  Required 1L yesterday and was fluid-responsive, continue aggressive maintenance fluids  Urinalysis positive and urine culture growing greater than 100 K CFU per mL Klebsiella pneumonia  She has had UTIs with E. coli and Klebsiella in the past. Klebsiella and E. coli have been sensitive to cephalosporins in the past (December 2021 and August 2023)  Concerning or aspiration event overnight: fever continues to recur and chest x-ray is concerning for developing pneumonia  Initial blood cultures with 1 of 2 positive for  gram-positive CC, likely contaminant. Repeat Blood Cx's neg at 24h  ID consulted, given that CT CAP is unremarkable and pt did not respond to CTX despite Klebs PNA on UCx being sensitive, they advise monitoring closely off abx  Acute metabolic encephalopathy and SCAR have resolved since admission  Monitor CBC and fever curve    Anemia  Assessment & Plan  Hgb dropped 11.3 to 9.7 today, suspect due to hemodilution given aggressive fluid resuscitation yesterday  No evidence of bleeding  Repeat AM CBC    Acute metabolic encephalopathy  Assessment & Plan  Unclear etiology, possibly due to infection  Pt is now alert x2 days  At baseline, patient is mostly nonverbal, only says few word, does not carry conversation. Presented with increased sleepiness/drowsiness.  On my exam, she is awake, alert. Makes eye contact, does not answer any question. Appearing almost back to baseline.  Speech appreciated.     Severe Intellectual disability  Assessment & Plan  -As per caregiver patient usually can say a few words at a time.  Other times she just grunts.    Gastroesophageal reflux disease  Assessment & Plan  -Continue PPI    Acute kidney injury (HCC)-resolved as of 5/13/2024  Assessment & Plan  resolved    Lactic acidosis-resolved as of 5/13/2024  Assessment & Plan  resolved       VTE Pharmacologic Prophylaxis: VTE Score: 6 High Risk (Score >/= 5) - Pharmacological DVT Prophylaxis Ordered: enoxaparin (Lovenox). Sequential Compression Devices Ordered.    Mobility:   Basic Mobility Inpatient Raw Score: 6  JH-HLM Goal: 2: Bed activities/Dependent transfer  JH-HLM Achieved: 2: Bed activities/Dependent transfer  JH-HLM Goal achieved. Continue to encourage appropriate mobility.    Patient Centered Rounds: I performed bedside rounds with nursing staff today.   Discussions with Specialists or Other Care Team Provider: ID    Education and Discussions with Family / Patient:  caregiver.     Total Time Spent on Date of Encounter in care of  patient: 45 mins. This time was spent on one or more of the following: performing physical exam; counseling and coordination of care; obtaining or reviewing history; documenting in the medical record; reviewing/ordering tests, medications or procedures; communicating with other healthcare professionals and discussing with patient's family/caregivers.    Current Length of Stay: 3 day(s)  Current Patient Status: Inpatient   Certification Statement: The patient will continue to require additional inpatient hospital stay due to close monitoring of WBC and fever curve off abx   Discharge Plan: Anticipate discharge in 24-48 hrs to home.    Code Status: Level 1 - Full Code    Subjective:   Nonverbal    Objective:     Vitals:   Temp (24hrs), Av.5 °F (37.5 °C), Min:98.3 °F (36.8 °C), Max:100.6 °F (38.1 °C)    Temp:  [98.3 °F (36.8 °C)-100.6 °F (38.1 °C)] 100 °F (37.8 °C)  HR:  [73-96] 96  Resp:  [17-20] 17  BP: (112-149)/(56-83) 149/81  SpO2:  [92 %-97 %] 93 %  Body mass index is 22.98 kg/m².     Input and Output Summary (last 24 hours):     Intake/Output Summary (Last 24 hours) at 2024 1356  Last data filed at 2024 0900  Gross per 24 hour   Intake 10 ml   Output --   Net 10 ml       Physical Exam:   Physical Exam  Vitals and nursing note reviewed.   Constitutional:       General: She is not in acute distress.     Appearance: Normal appearance. She is normal weight. She is not ill-appearing or toxic-appearing.      Comments: Patient is awake and alert and makes eye contact, appears comfortable   HENT:      Head: Normocephalic and atraumatic.      Right Ear: External ear normal.      Left Ear: External ear normal.      Nose: Nose normal.      Mouth/Throat:      Mouth: Mucous membranes are moist.      Pharynx: Oropharynx is clear.   Eyes:      Conjunctiva/sclera: Conjunctivae normal.   Cardiovascular:      Rate and Rhythm: Regular rhythm. Tachycardia present.      Pulses: Normal pulses.      Heart sounds: Normal  heart sounds. No murmur heard.     No gallop.   Pulmonary:      Effort: Pulmonary effort is normal. No respiratory distress.      Breath sounds: No stridor. No rhonchi or rales.   Abdominal:      General: Abdomen is flat. Bowel sounds are normal. There is no distension.      Palpations: Abdomen is soft. There is no mass.      Tenderness: There is no abdominal tenderness. There is no guarding or rebound.      Hernia: No hernia is present.   Musculoskeletal:      Cervical back: Normal range of motion.      Right lower leg: No edema.      Left lower leg: No edema.   Skin:     General: Skin is warm and dry.   Neurological:      Mental Status: She is alert. Mental status is at baseline.          Additional Data:     Labs:  Results from last 7 days   Lab Units 05/13/24  0435   WBC Thousand/uL 7.80   HEMOGLOBIN g/dL 9.7*   HEMATOCRIT % 30.7*   PLATELETS Thousands/uL 116*   SEGS PCT % 64   LYMPHO PCT % 26   MONO PCT % 9   EOS PCT % 0     Results from last 7 days   Lab Units 05/13/24  0435   SODIUM mmol/L 143   POTASSIUM mmol/L 4.3   CHLORIDE mmol/L 108   CO2 mmol/L 24   BUN mg/dL 10   CREATININE mg/dL 0.84   ANION GAP mmol/L 11   CALCIUM mg/dL 8.0*   ALBUMIN g/dL 3.2*   TOTAL BILIRUBIN mg/dL 0.30   ALK PHOS U/L 28*   ALT U/L <3*   AST U/L 18   GLUCOSE RANDOM mg/dL 170*     Results from last 7 days   Lab Units 05/10/24  1002   INR  0.87     Results from last 7 days   Lab Units 05/13/24  1052 05/13/24  0740 05/13/24  0040 05/12/24  2034 05/12/24  1615 05/12/24  1203 05/12/24  0745 05/12/24  0644 05/12/24  0023 05/11/24  1628 05/11/24  1049 05/11/24  0709   POC GLUCOSE mg/dl 215* 202* 167* 132 119 197* 255* 247* 128 121 122 107     Results from last 7 days   Lab Units 05/10/24  1002   HEMOGLOBIN A1C % 6.3*     Results from last 7 days   Lab Units 05/13/24  0435 05/12/24  0538 05/11/24  0512 05/10/24  1925 05/10/24  1612 05/10/24  1310 05/10/24  1002   LACTIC ACID mmol/L  --  1.2 0.8 3.6* 4.2*   < > 7.6*   PROCALCITONIN ng/ml  0.14 0.06 0.10  --  0.06  --  <0.05    < > = values in this interval not displayed.       Lines/Drains:  Invasive Devices       Peripheral Intravenous Line  Duration             Peripheral IV 05/10/24 Right Antecubital 3 days    Peripheral IV 05/12/24 Left;Ventral (anterior) Forearm 1 day                          Imaging: Reviewed radiology reports from this admission including: chest CT scan and abdominal/pelvic CT    Recent Cultures (last 7 days):   Results from last 7 days   Lab Units 05/12/24  0540 05/10/24  1102 05/10/24  1002   BLOOD CULTURE  No Growth at 24 hrs.  No Growth at 24 hrs.  --  Staphylococcus species*  No Growth at 48 hrs.   GRAM STAIN RESULT   --   --  Gram positive cocci in clusters*   URINE CULTURE   --  >100,000 cfu/ml Klebsiella pneumoniae*  --        Last 24 Hours Medication List:   Current Facility-Administered Medications   Medication Dose Route Frequency Provider Last Rate    acetaminophen  325 mg Rectal Q4H PRN LEATHA Robison      bisacodyl  10 mg Rectal Daily PRN Geoffrey Danielle MD      carbidopa-levodopa  2 tablet Oral TID Amy Moulton MD      citalopram  20 mg Oral Daily Geoffrey Danielle MD      divalproex sodium  375 mg Oral TID Geoffrey Danielle MD      enoxaparin  40 mg Subcutaneous Daily Geoffrey Danielle MD      insulin lispro  1-5 Units Subcutaneous TID AC Geoffrey Danielle MD      levothyroxine  25 mcg Oral Early Morning Geoffrey Danielle MD      multi-electrolyte  125 mL/hr Intravenous Continuous Madina Spence PA-C 125 mL/hr (05/13/24 0636)    senna-docusate sodium  1 tablet Oral BID Madina Spence PA-C      Valbenazine Tosylate  40 mg Oral Daily Geoffrey Danielle MD          Today, Patient Was Seen By: Madina Spence PA-C    **Please Note: This note may have been constructed using a voice recognition system.**

## 2024-05-14 LAB
ANION GAP SERPL CALCULATED.3IONS-SCNC: 8 MMOL/L (ref 4–13)
BASOPHILS # BLD AUTO: 0.04 THOUSANDS/ÂΜL (ref 0–0.1)
BASOPHILS NFR BLD AUTO: 1 % (ref 0–1)
BUN SERPL-MCNC: 8 MG/DL (ref 5–25)
CALCIUM SERPL-MCNC: 7.9 MG/DL (ref 8.4–10.2)
CHLORIDE SERPL-SCNC: 106 MMOL/L (ref 96–108)
CO2 SERPL-SCNC: 28 MMOL/L (ref 21–32)
CREAT SERPL-MCNC: 0.73 MG/DL (ref 0.6–1.3)
EOSINOPHIL # BLD AUTO: 0.21 THOUSAND/ÂΜL (ref 0–0.61)
EOSINOPHIL NFR BLD AUTO: 3 % (ref 0–6)
ERYTHROCYTE [DISTWIDTH] IN BLOOD BY AUTOMATED COUNT: 13.6 % (ref 11.6–15.1)
FERRITIN SERPL-MCNC: 109 NG/ML (ref 11–307)
GFR SERPL CREATININE-BSD FRML MDRD: 89 ML/MIN/1.73SQ M
GLUCOSE SERPL-MCNC: 159 MG/DL (ref 65–140)
GLUCOSE SERPL-MCNC: 183 MG/DL (ref 65–140)
GLUCOSE SERPL-MCNC: 185 MG/DL (ref 65–140)
GLUCOSE SERPL-MCNC: 209 MG/DL (ref 65–140)
GLUCOSE SERPL-MCNC: 300 MG/DL (ref 65–140)
GLUCOSE SERPL-MCNC: 64 MG/DL (ref 65–140)
HCT VFR BLD AUTO: 30 % (ref 34.8–46.1)
HGB BLD-MCNC: 9.6 G/DL (ref 11.5–15.4)
IMM GRANULOCYTES # BLD AUTO: 0.02 THOUSAND/UL (ref 0–0.2)
IMM GRANULOCYTES NFR BLD AUTO: 0 % (ref 0–2)
IRON SATN MFR SERPL: 21 % (ref 15–50)
IRON SERPL-MCNC: 34 UG/DL (ref 50–212)
LYMPHOCYTES # BLD AUTO: 1.46 THOUSANDS/ÂΜL (ref 0.6–4.47)
LYMPHOCYTES NFR BLD AUTO: 22 % (ref 14–44)
MCH RBC QN AUTO: 32.9 PG (ref 26.8–34.3)
MCHC RBC AUTO-ENTMCNC: 32 G/DL (ref 31.4–37.4)
MCV RBC AUTO: 103 FL (ref 82–98)
MONOCYTES # BLD AUTO: 0.53 THOUSAND/ÂΜL (ref 0.17–1.22)
MONOCYTES NFR BLD AUTO: 8 % (ref 4–12)
NEUTROPHILS # BLD AUTO: 4.31 THOUSANDS/ÂΜL (ref 1.85–7.62)
NEUTS SEG NFR BLD AUTO: 66 % (ref 43–75)
NRBC BLD AUTO-RTO: 0 /100 WBCS
PLATELET # BLD AUTO: 120 THOUSANDS/UL (ref 149–390)
PMV BLD AUTO: 12.5 FL (ref 8.9–12.7)
POTASSIUM SERPL-SCNC: 4 MMOL/L (ref 3.5–5.3)
PROCALCITONIN SERPL-MCNC: 0.18 NG/ML
RBC # BLD AUTO: 2.92 MILLION/UL (ref 3.81–5.12)
SODIUM SERPL-SCNC: 142 MMOL/L (ref 135–147)
TIBC SERPL-MCNC: 161 UG/DL (ref 250–450)
UIBC SERPL-MCNC: 127 UG/DL (ref 155–355)
WBC # BLD AUTO: 6.57 THOUSAND/UL (ref 4.31–10.16)

## 2024-05-14 PROCEDURE — 99233 SBSQ HOSP IP/OBS HIGH 50: CPT | Performed by: STUDENT IN AN ORGANIZED HEALTH CARE EDUCATION/TRAINING PROGRAM

## 2024-05-14 PROCEDURE — 84145 PROCALCITONIN (PCT): CPT | Performed by: PHYSICIAN ASSISTANT

## 2024-05-14 PROCEDURE — 82728 ASSAY OF FERRITIN: CPT

## 2024-05-14 PROCEDURE — 85025 COMPLETE CBC W/AUTO DIFF WBC: CPT | Performed by: PHYSICIAN ASSISTANT

## 2024-05-14 PROCEDURE — 80048 BASIC METABOLIC PNL TOTAL CA: CPT | Performed by: PHYSICIAN ASSISTANT

## 2024-05-14 PROCEDURE — 83550 IRON BINDING TEST: CPT

## 2024-05-14 PROCEDURE — C9113 INJ PANTOPRAZOLE SODIUM, VIA: HCPCS

## 2024-05-14 PROCEDURE — 82948 REAGENT STRIP/BLOOD GLUCOSE: CPT

## 2024-05-14 PROCEDURE — 83540 ASSAY OF IRON: CPT

## 2024-05-14 PROCEDURE — 99232 SBSQ HOSP IP/OBS MODERATE 35: CPT

## 2024-05-14 PROCEDURE — 92526 ORAL FUNCTION THERAPY: CPT

## 2024-05-14 RX ORDER — BACLOFEN 10 MG/1
10 TABLET ORAL 3 TIMES DAILY
Status: DISCONTINUED | OUTPATIENT
Start: 2024-05-14 | End: 2024-05-15 | Stop reason: HOSPADM

## 2024-05-14 RX ORDER — TRAZODONE HYDROCHLORIDE 50 MG/1
50 TABLET ORAL
Status: DISCONTINUED | OUTPATIENT
Start: 2024-05-14 | End: 2024-05-15 | Stop reason: HOSPADM

## 2024-05-14 RX ORDER — DEXTROSE MONOHYDRATE 25 G/50ML
INJECTION, SOLUTION INTRAVENOUS
Status: COMPLETED
Start: 2024-05-14 | End: 2024-05-14

## 2024-05-14 RX ORDER — PANTOPRAZOLE SODIUM 40 MG/10ML
40 INJECTION, POWDER, LYOPHILIZED, FOR SOLUTION INTRAVENOUS
Status: DISCONTINUED | OUTPATIENT
Start: 2024-05-14 | End: 2024-05-15 | Stop reason: HOSPADM

## 2024-05-14 RX ADMIN — INSULIN LISPRO 3 UNITS: 100 INJECTION, SOLUTION INTRAVENOUS; SUBCUTANEOUS at 17:05

## 2024-05-14 RX ADMIN — CARBIDOPA AND LEVODOPA 2 TABLET: 25; 100 TABLET ORAL at 08:51

## 2024-05-14 RX ADMIN — DIVALPROEX SODIUM 375 MG: 125 CAPSULE, COATED PELLETS ORAL at 20:11

## 2024-05-14 RX ADMIN — SENNOSIDES, DOCUSATE SODIUM 1 TABLET: 8.6; 5 TABLET ORAL at 17:01

## 2024-05-14 RX ADMIN — PANTOPRAZOLE SODIUM 40 MG: 40 INJECTION, POWDER, FOR SOLUTION INTRAVENOUS at 11:53

## 2024-05-14 RX ADMIN — SENNOSIDES, DOCUSATE SODIUM 1 TABLET: 8.6; 5 TABLET ORAL at 08:51

## 2024-05-14 RX ADMIN — VALBENAZINE 40 MG: 40 CAPSULE ORAL at 08:56

## 2024-05-14 RX ADMIN — DIVALPROEX SODIUM 375 MG: 125 CAPSULE, COATED PELLETS ORAL at 08:54

## 2024-05-14 RX ADMIN — BACLOFEN 10 MG: 10 TABLET ORAL at 20:11

## 2024-05-14 RX ADMIN — DIVALPROEX SODIUM 375 MG: 125 CAPSULE, COATED PELLETS ORAL at 16:02

## 2024-05-14 RX ADMIN — ENOXAPARIN SODIUM 40 MG: 40 INJECTION SUBCUTANEOUS at 08:51

## 2024-05-14 RX ADMIN — DEXTROSE MONOHYDRATE 25 ML: 25 INJECTION, SOLUTION INTRAVENOUS at 20:59

## 2024-05-14 RX ADMIN — CITALOPRAM HYDROBROMIDE 20 MG: 20 TABLET ORAL at 08:51

## 2024-05-14 RX ADMIN — CARBIDOPA AND LEVODOPA 2 TABLET: 25; 100 TABLET ORAL at 20:11

## 2024-05-14 RX ADMIN — CARBIDOPA AND LEVODOPA 2 TABLET: 25; 100 TABLET ORAL at 16:02

## 2024-05-14 RX ADMIN — LEVOTHYROXINE SODIUM 25 MCG: 25 TABLET ORAL at 05:12

## 2024-05-14 RX ADMIN — INSULIN LISPRO 1 UNITS: 100 INJECTION, SOLUTION INTRAVENOUS; SUBCUTANEOUS at 14:26

## 2024-05-14 RX ADMIN — BACLOFEN 10 MG: 10 TABLET ORAL at 16:02

## 2024-05-14 RX ADMIN — INSULIN LISPRO 1 UNITS: 100 INJECTION, SOLUTION INTRAVENOUS; SUBCUTANEOUS at 08:52

## 2024-05-14 NOTE — ASSESSMENT & PLAN NOTE
Reviewed home medications with charge nurse at group home  Continue home Celexa 20 mg daily, Depakote 370 mg p.o. 3 times daily  Patient's home trazodone, Ativan and Abilify were held due to increased somnolence  Will offer reduced dose trazodone tonight as needed needed for insomnia  Plan to resume home medications on discharge

## 2024-05-14 NOTE — ASSESSMENT & PLAN NOTE
Patient is nonverbal, will occasionally say yes or no and grunt  Continue bedside supportive care, encourage incentive spirometry and feedings

## 2024-05-14 NOTE — ASSESSMENT & PLAN NOTE
Lab Results   Component Value Date    HGBA1C 6.3 (H) 05/10/2024     Recent Labs     05/13/24  1052 05/13/24  1621 05/13/24  2117 05/14/24  0724   POCGLU 215* 244* 166* 209*       Holding home oral hypoglycemics, continue sign scale insulin Accu-Cheks

## 2024-05-14 NOTE — ASSESSMENT & PLAN NOTE
Hgb dropped 11.3 to 9.6 today, suspect due to hemodilution given aggressive fluid resuscitation  No evidence of bleeding  Follow up iron panel  Monitor CBC

## 2024-05-14 NOTE — PROGRESS NOTES
[facility]  Progress Note  Name: Terrie Alicea I  MRN: [id number]  Unit/Bed#: W -01 I Date of Admission: 5/10/2024   Date of Service: 5/14/2024 I Hospital Day: 4    Assessment/Plan   * SIRS (systemic inflammatory response syndrome) ([facility])  Assessment & Plan  SIRS vs Sepsis, presented from group home with fever, hypotension and increased drowsiness.    Lactic acidosis and hypoxia resolved  Seen by infectious disease  Received IV antibiotics treatment for Klebsiella in urine, questionable colonization.  She has been on chronic Macrobid from urology since 2016.  Discussed with ID.  Will DC on discharge given intermediate coverage and concern for resistance  Other source also concerning for aspiration event as there was some debris in trachea and patient went transiently hypotensive with fever however CT otherwise unremarkable  Procalcitonin's remain negative, repeat blood cultures negative at 24 hours.  Afebrile over 24 hours  Continue to monitor off antibiotics  There is a likelihood that dehydration could have led to patient's hypotension and lactic acidosis - continue to work with speech. She is eating and drinking for nursing.  Continue monitor hemodynamics off IVF    Anemia  Assessment & Plan  Hgb dropped 11.3 to 9.6 today, suspect due to hemodilution given aggressive fluid resuscitation  No evidence of bleeding  Follow up iron panel  Monitor CBC    Hypothyroidism  Assessment & Plan  Continue home levothyroxine 25 mcg    Hypotension  Assessment & Plan  Hypotensive episodes resolved  Patient's blood pressure does tend to run lower, recent outpatient office visit 90/60's  She is on multiple sedating medications  Monitor off fluids today    Constipation  Assessment & Plan  With large BM noted 513 evening  Continue bowel regimen    Type 2 diabetes mellitus, without long-term current use of insulin ([facility])  Assessment & Plan  Lab Results   Component Value Date    HGBA1C 6.3 (H)  05/10/2024     Recent Labs     05/13/24  1052 05/13/24  1621 05/13/24  2117 05/14/24  0724   POCGLU 215* 244* 166* 209*       Holding home oral hypoglycemics, continue sign scale insulin Accu-Cheks    Severe Intellectual disability  Assessment & Plan  Patient is nonverbal, will occasionally say yes or no and grunt  Continue bedside supportive care, encourage incentive spirometry and feedings    Bipolar disorder (HCC)  Assessment & Plan  Reviewed home medications with charge nurse at group home  Continue home Celexa 20 mg daily, Depakote 370 mg p.o. 3 times daily  Patient's home trazodone, Ativan and Abilify were held due to increased somnolence  Will offer reduced dose trazodone tonight as needed needed for insomnia  Plan to resume home medications on discharge    Mixed cerebral palsy (HCC)  Assessment & Plan  Patient follows neurology and PMR for her dystonic cerebral palsy  She also has bipolar and mood disturbance disorder  Also with parkinsonism which appears secondary to her psychiatric meds along with EPS  Continue her Sinemet and valbenazine  Her baclofen 10 mg 3 times daily was on hold due to lethargy, will resume as she is back to baseline  Plan for discharge home to group home once able    Gastroesophageal reflux disease  Assessment & Plan  Maintained on omeprazole, will continue with inpatient pantoprazole    Acute metabolic encephalopathy-resolved as of 5/14/2024  Assessment & Plan  Unclear etiology, possibly due to infection  Patient is back at her baseline, mostly nonverbal, only says few word, does not carry conversation  On exam she is awake, alert. Makes eye contact  Ongoing speech evaluation appreciated         VTE Pharmacologic Prophylaxis: VTE Score: 6 High Risk (Score >/= 5) - Pharmacological DVT Prophylaxis Ordered: enoxaparin (Lovenox). Sequential Compression Devices Ordered.    Mobility:   Basic Mobility Inpatient Raw Score: 6  [facility] Goal: 2: Bed activities/Dependent transfer  [facility]  Achieved: 2: Bed activities/Dependent transfer  [facility] Goal NOT achieved. Continue with multidisciplinary rounding and encourage appropriate mobility to improve upon [facility] goals.    Patient Centered Rounds: I performed bedside rounds with nursing staff today.   Discussions with Specialists or Other Care Team Provider: Infectious DIsease, CM    Education and Discussions with Family / Patient: Updated  (Nursing home) via phone.    Total Time Spent on Date of Encounter in care of patient: 50 mins. This time was spent on one or more of the following: performing physical exam; counseling and coordination of care; obtaining or reviewing history; documenting in the medical record; reviewing/ordering tests, medications or procedures; communicating with other healthcare professionals and discussing with patient's family/caregivers.    Current Length of Stay: 4 day(s)  Current Patient Status: Inpatient   Certification Statement: The patient will continue to require additional inpatient hospital stay due to monitoring off fluids  Discharge Plan: Anticipate discharge tomorrow to [facility].    Code Status: Level 1 - Full Code    Subjective:   Patient seen and examined lying comfortably in bed.  Per nursing no events overnight.  She had a large bowel movement.  Ate a limited breakfast.  She is on room air    She is able to say no to pain and yes to wanting to go home.  She is nonverbal at baseline otherwise    Objective:     Vitals:   Temp (24hrs), Av.2 °F (36.8 °C), Min:97.9 °F (36.6 °C), Max:98.7 °F (37.1 °C)    Temp:  [97.9 °F (36.6 °C)-98.7 °F (37.1 °C)] 98.4 °F (36.9 °C)  HR:  [61-73] 69  Resp:  [12] 12  BP: ()/(56-70) 118/70  SpO2:  [97 %-98 %] 98 %  Body mass index is 23.43 kg/m².     Input and Output Summary (last 24 hours):     Intake/Output Summary (Last 24 hours) at 2024 1107  Last data filed at 2024 1823  Gross per 24 hour   Intake 200 ml   Output --   Net 200 ml       Physical  Exam:   Physical Exam  Vitals and nursing note reviewed.   Constitutional:       General: She is not in acute distress.     Appearance: She is well-developed. She is not toxic-appearing.      Comments: Chronically ill appearing   Cardiovascular:      Rate and Rhythm: Normal rate and regular rhythm.   Pulmonary:      Effort: Pulmonary effort is normal. No respiratory distress.      Comments: Room air 94%. Limited lung exam. Grossly without abnormal breath sounds  Abdominal:      General: Bowel sounds are normal.      Palpations: Abdomen is soft.      Tenderness: There is no abdominal tenderness.   Musculoskeletal:      Right lower leg: No edema.      Left lower leg: No edema.      Comments: Contracted extremities   Skin:     General: Skin is warm and dry.      Findings: No bruising.   Neurological:      Mental Status: She is alert. Mental status is at baseline.      Comments: Nonverbal . Opens eyes, grunting. Says yes and no        Additional Data:     Labs:  Results from last 7 days   Lab Units 05/14/24  0544   WBC Thousand/uL 6.57   HEMOGLOBIN g/dL 9.6*   HEMATOCRIT % 30.0*   PLATELETS Thousands/uL 120*   SEGS PCT % 66   LYMPHO PCT % 22   MONO PCT % 8   EOS PCT % 3     Results from last 7 days   Lab Units 05/14/24  0544 05/13/24  0435   SODIUM mmol/L 142 143   POTASSIUM mmol/L 4.0 4.3   CHLORIDE mmol/L 106 108   CO2 mmol/L 28 24   BUN mg/dL 8 10   CREATININE mg/dL 0.73 0.84   ANION GAP mmol/L 8 11   CALCIUM mg/dL 7.9* 8.0*   ALBUMIN g/dL  --  3.2*   TOTAL BILIRUBIN mg/dL  --  0.30   ALK PHOS U/L  --  28*   ALT U/L  --  <3*   AST U/L  --  18   GLUCOSE RANDOM mg/dL 185* 170*     Results from last 7 days   Lab Units 05/10/24  1002   INR  0.87     Results from last 7 days   Lab Units 05/14/24  0724 05/13/24  2117 05/13/24  1621 05/13/24  1052 05/13/24  0740 05/13/24  0040 05/12/24  2034 05/12/24  1615 05/12/24  1203 05/12/24  0745 05/12/24  0644 05/12/24  0023   POC GLUCOSE mg/dl 209* 166* 244* 215* 202* 167* 132 119  197* 255* 247* 128     Results from last 7 days   Lab Units 05/10/24  1002   HEMOGLOBIN A1C % 6.3*     Results from last 7 days   Lab Units 05/14/24  0544 05/13/24  0435 05/12/24  0538 05/11/24  0512 05/10/24  1925 05/10/24  1612   LACTIC ACID mmol/L  --   --  1.2 0.8 3.6* 4.2*   PROCALCITONIN ng/ml 0.18 0.14 0.06 0.10  --  0.06       Lines/Drains:  Invasive Devices       Peripheral Intravenous Line  Duration             Peripheral IV 05/10/24 Right Antecubital 4 days    Peripheral IV 05/12/24 Left;Ventral (anterior) Forearm 2 days                    Imaging: Reviewed radiology reports from this admission including: chest xray, chest CT scan, and abdominal/pelvic CT    Recent Cultures (last 7 days):   Results from last 7 days   Lab Units 05/12/24  0540 05/10/24  1102 05/10/24  1002   BLOOD CULTURE  No Growth at 24 hrs.  No Growth at 24 hrs.  --  No Growth at 72 hrs.  Staphylococcus species*   GRAM STAIN RESULT   --   --  Gram positive cocci in clusters*   URINE CULTURE   --  >100,000 cfu/ml Klebsiella pneumoniae*  --        Last 24 Hours Medication List:   Current Facility-Administered Medications   Medication Dose Route Frequency Provider Last Rate    acetaminophen  325 mg Rectal Q4H PRN LEATHA Robison      baclofen  10 mg Oral TID Esther Ocampo PA-C      bisacodyl  10 mg Rectal Daily PRN Geoffrey Danielle MD      carbidopa-levodopa  2 tablet Oral TID Amy Moulton MD      citalopram  20 mg Oral Daily Geoffrey Danielle MD      divalproex sodium  375 mg Oral TID Geoffrey Danielle MD      enoxaparin  40 mg Subcutaneous Daily Geoffrey Danielle MD      insulin lispro  1-5 Units Subcutaneous TID AC Geoffrey Danielle MD      levothyroxine  25 mcg Oral Early Morning Geoffrey Danielle MD      pantoprazole  40 mg Intravenous Q24H [facility] Esther Ocampo PA-C      senna-docusate sodium  1 tablet Oral BID Madina Spence PA-C      traZODone  50 mg Oral HS PRN Bryanne Horn, PA-C      Valbenazine Tosylate  40 mg Oral Daily Geoffrey Danielle MD           Today, Patient Was Seen By: Esther Ocampo PA-C    **Please Note: This note may have been constructed using a voice recognition system.**

## 2024-05-14 NOTE — ASSESSMENT & PLAN NOTE
Patient follows neurology and PMR for her dystonic cerebral palsy  She also has bipolar and mood disturbance disorder  Also with parkinsonism which appears secondary to her psychiatric meds along with EPS  Continue her Sinemet and valbenazine  Her baclofen 10 mg 3 times daily was on hold due to lethargy, will resume as she is back to baseline  Plan for discharge home to group home once able

## 2024-05-14 NOTE — PROGRESS NOTES
Progress Note - Infectious Disease   Terrie Alicea 63 y.o. female MRN: [id number]  Unit/Bed#: W -01 Encounter: [id number]      Impression/Plan:  SIRS vs Sepsis. P/w hypotension, lactic acidosis, and new O2 requirement. Spiked fever to 103F. Source was initially presumed to be UTI, given abnormal UA and urine culture with growth of Klebsiella.  However, patient did not improve on IV ceftriaxone though the Klebsiella is susceptible to it. It also appears patient has previously grown K.pneumoniae from Ucx dating back to 2016, suggesting potential colonization.  Unfortunately, due to patient not being communicative, it is difficult to determine source of infection.  Although patient was on supplemental O2, she had no significant respiratory symptoms. CT chest unremarkable other than some debris in trachea. Consider aspiration.  CT A/P otherwise unremarkable without evidence of obstructive uropathy or intra-abdominal pathology. Patient not taking in much by mouth. Consider dehydration which could have led to lactic acidosis and hypovolemia. Consider viral etiology of fevers given patient lives in a facility. Consider recurrent aspiration events. Patient remains otherwise stable, WBC WNL. Repeat blood cultures NGTD. Now afebrile after stopping antibiotics given no clear bacterial source of infection identified.  Continue to monitor closely off antibiotics  Continue to monitor temperature/vitals  Continue to follow CBCD and BMP with AM labs to assess clinically  Serial examinations  Additional supportive care per primary team   Aspiration precautions     2.  Concern for UTI, with urine culture growing Klebsiella.  As in above, with inability to obtain history, it is unclear whether this is bladder colonization or UTI.  Per chart review, patient has grown K.pneumoniae from urine cultures back to 2016. She was started on PO macrobid suppression by Urology for recurrent UTIs and has been on it since 2016. Was lost to  follow-up and hasn't seen Urology since then per chart review. Most recent urine isolates all resistant to macrobid (8/2023, 9/2022, 12/2021). Ucx from 5/10/24 intermediate to macrobid. Would stop macrobid and monitor off as it is not clinically providing benefit at this time. Patient should follow-up with urology in outpatient setting. Given she did not response to CTX despite it being sensitive, suspect this positive urine culture is also representative of colonization. CT A/P unremarkable for obstructive uropathy. Favor stopping antibiotics and monitoring off as above.  Monitor closely off antibiotics  Serial examinations  Stop macrobid as it is not providing any clinical benefit  Outpatient urology follow-up.      3.  Positive blood cultures. Admission blood cultures positive 1 out of 2 for Coagulase-negative Staphylococcus. This is most likely contaminant blood draw.  No antibiotic needed for this indication.     4.  Encephalopathy. At baseline, patient is mostly nonverbal. P/w increased sleepiness/drowsiness. Mental status is improved, with patient more awake and alert, although she is still not communicative.  Monitor mental status.     5.  SCAR.  Scr elevated to 1.2 on admission. Now, creatinine is improved to 0.84 with fluid administration.   Continue to trend Scr  Avoid nephrotoxins     6.  T2DM. Most recent A1C 6.3. Well controlled. Continue with tight glycemic control.   Management per primary service.     7.  Severe intellectual disability, with chronic encephalopathy.    Above plan was discussed in detail with patient at bedside.   Above plan was discussed in detail with primary service AP, who agrees with the plan to continue to monitor off antibiotics at this time.    OK for discharge from ID standpoint.    Antibiotics:  Off antibiotics    Subjective:  Patient nonverbal. Does not interact throughout examination.    Objective:  Vitals:  Temp:  [97.9 °F (36.6 °C)-98.7 °F (37.1 °C)] 98.4 °F (36.9  °C)  HR:  [61-73] 69  Resp:  [12] 12  BP: ()/(56-70) 118/70  SpO2:  [97 %-98 %] 98 %  Temp (24hrs), Av.2 °F (36.8 °C), Min:97.9 °F (36.6 °C), Max:98.7 °F (37.1 °C)  Current: Temperature: 98.4 °F (36.9 °C)    Physical Exam:   General Appearance:  Chronically ill appearing, nonverbal, does not interact throughout examination, no acute distress currently, awake.   Throat: No perioral lesions or ulcerations noted.    Lungs:   Decreased breath sounds bilaterally, no wheezes, rhonchi or rales; respirations unlabored. On RA currently   Heart:  RRR; no murmur, rub or gallop.   Abdomen:   Soft, non-tender, non-distended, positive bowel sounds.     Extremities: No clubbing or cyanosis, no edema.   Skin: No new rashes, lesions, or draining wounds noted on exposed skin.     Labs, Imaging, & Other studies:   All pertinent labs and imaging studies were personally reviewed  Results from last 7 days   Lab Units 24  0544 24  0435 24  0538   WBC Thousand/uL 6.57 7.80 8.21   HEMOGLOBIN g/dL 9.6* 9.7* 11.3*   PLATELETS Thousands/uL 120* 116* 108*     Results from last 7 days   Lab Units 24  0544 24  0435 24  0512 05/10/24  1002   POTASSIUM mmol/L 4.0 4.3   < > 5.3   CHLORIDE mmol/L 106 108   < > 100   CO2 mmol/L 28 24   < > 27   BUN mg/dL 8 10   < > 25   CREATININE mg/dL 0.73 0.84   < > 1.20   EGFR ml/min/1.73sq m 89 75   < > 48   CALCIUM mg/dL 7.9* 8.0*   < > 9.5   AST U/L  --  18  --  20   ALT U/L  --  <3*  --  3*   ALK PHOS U/L  --  28*  --  32*    < > = values in this interval not displayed.     Results from last 7 days   Lab Units 24  0540 05/10/24  1102 05/10/24  1002   BLOOD CULTURE  No Growth at 24 hrs.  No Growth at 24 hrs.  --  No Growth at 72 hrs.  Staphylococcus species*   GRAM STAIN RESULT   --   --  Gram positive cocci in clusters*   URINE CULTURE   --  >100,000 cfu/ml Klebsiella pneumoniae*  --      Results from last 7 days   Lab Units 24  0544 24  0435  05/12/24  0538 05/11/24  0512 05/10/24  1612 05/10/24  1002   PROCALCITONIN ng/ml 0.18 0.14 0.06 0.10 0.06 <0.05                     Imaging Studies:  I have personally reviewed pertinent imaging study reports and images in PACS.     5/12 CT Chest/A/P: dependent debris in the trachea in keep with mucus. Bibasilar atelectasis. Otherwise, lungs are clear. No obstructive uropathy. No intra-abdominal pathology.       Indigo Caputo PA-C  [facility]

## 2024-05-14 NOTE — ASSESSMENT & PLAN NOTE
Unclear etiology, possibly due to infection  Patient is back at her baseline, mostly nonverbal, only says few word, does not carry conversation  On exam she is awake, alert. Makes eye contact  Ongoing speech evaluation appreciated

## 2024-05-14 NOTE — CASE MANAGEMENT
Case Management Assessment & Discharge Planning Note    Patient name Terrie Alicea  Location W /W -01 MRN [id number]  : 1960 Date 2024       Current Admission Date: 5/10/2024  Current Admission Diagnosis:SIRS (systemic inflammatory response syndrome) ([facility])   Patient Active Problem List    Diagnosis Date Noted    SIRS (systemic inflammatory response syndrome) ([facility]) 2024    Developmental delay     Preop examination 2023    Cataract 2023    Internal hemorrhoids 2023    Contracture of right hand 2023    Need for shingles vaccine 2023    Viral illness 2022    Dystonic cerebral palsy ([facility]) 2022    Cervical dystonia 2022    Parkinsonism 2022    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[facility]) 10/18/2021    Polyneuropathy associated with underlying disease ([facility]) 10/18/2021    Hypercalcemia 2021    Herpes zoster without complication 2021    Abnormal finding on lung imaging 2021    Anemia 2020    Eosinophilic leukocytosis 2020    Underweight 2020    Dysphagia 2020    History of vitamin D deficiency 2020    History of fall 10/28/2019    Breast asymmetry 2019    Hypernatremia 2018    Gait disturbance 2018    Osteoarthritis of both hips 2018    Severe Intellectual disability 2018    Type 2 diabetes mellitus, without long-term current use of insulin ([facility]) 2018    Abnormal albumin 2017    Peripheral neuropathy 2017    Seasonal allergies 2017    Physical deconditioning 2017    Hyperlipidemia 2017    Gastroesophageal reflux disease 2017    Mixed cerebral palsy ([facility]) 2017    Spasticity 2017    Ambulatory dysfunction 2017    Bipolar disorder ([facility]) 2017    Hypotension 2016    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    Osteoporosis 2016    Resting tremor 2016    Gait abnormality 2015    Other chronic pain  07/30/2015    Postmenopausal vaginal bleeding 07/23/2015    Anxiety 07/15/2015    Menopause 09/25/2014    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/28/2014    Atopic dermatitis 06/02/2014    Dry eye 06/02/2014    Constipation 04/12/2013    Iron deficiency anemia 03/27/2013    Urinary incontinence 03/27/2013    Hypothyroidism 01/10/2013      LOS (days): 4  Geometric Mean LOS (GMLOS) (days): 5.1  Days to GMLOS:1.2     OBJECTIVE:    Risk of Unplanned Readmission Score: 19.76         Current admission status: Inpatient       Preferred Pharmacy:   [facility] [place], [place] - [address]  [address]  [address]  [place] [address]  Phone: [phone] Fax: [phone]    [facility]  [address]  [place] [address]  Phone: [phone] Fax: [phone]    [facility] [place], [place] - [address]  [address]  [facility] [address]  Phone: [phone] Fax: [phone]    Primary Care Provider: Juan David Villar MD    Primary Insurance: [employer] MEDICARE [facility]  Secondary Insurance: [place] MEDICAL ASSIST[place]    ASSESSMENT:  Active Health Care Proxies       Ella Morgan  [facility] Care Representative - Other   Primary Phone: [phone] (Mobile)  Work Phone: [phone]         Readmission Root Cause  30 Day Readmission: No    Patient Information  Admitted from:: Home  Mental Status: Other (Comment), Alert (per RN)  During Assessment patient was accompanied by: Not accompanied during assessment  Assessment information provided by:: Other - please comment (Charge RN from [facility]; Lisa)  Primary Caregiver: Other (Comment)  Caregiver's Name:: [facility]  Caregiver's Relationship to Patient:: Other (Specify)  Support Systems: Home care staff  [place]  [place] [facility] do you live in?: [place]  Home entry access options. Select all that apply.: No steps  to enter home  Type of Current Residence: Group home, [place]  Upon entering residence, is there a bedroom on the main floor (no further steps)?: Yes  Upon entering residence, is there a bathroom on the main floor (no further steps)?: Yes  Living Arrangements: Other (Comment) (ICF/ID)    Activities of Daily Living Prior to Admission  Functional Status: Total dependent  Completes ADLs independently?: No  Level of ADL dependence: Total Dependent  Ambulates independently?: No  Level of ambulatory dependence: Assistance  Does patient use assisted devices?: Yes  Assisted Devices (DME) used: Wheelchair, Walker  Does patient currently own DME?: Yes  What DME does the patient currently own?: Wheelchair, Walker  Does patient have a history of Outpatient Therapy (PT/OT)?: Yes  Does patient have a history of HHC?: Yes  Does patient currently have HHC?: No    Patient Information Continued  Income Source: SSI/SSD  Does patient have prescription coverage?: Yes  Does patient receive dialysis treatments?: No  Does patient have a history of substance abuse?: No    Means of Transportation  Means of Transport to [facility]:: Other (Comment) (facility transport)    Social Determinants of Health (SDOH)      Flowsheet Row Most Recent Value   Housing Stability    In the last 12 months, was there a time when you were not able to pay the mortgage or rent on time? N   In the last 12 months, how many places have you lived? 1   In the last 12 months, was there a time when you did not have a steady place to sleep or slept in a shelter (including now)? N   Transportation Needs    In the past 12 months, has lack of transportation kept you from medical appointments or from getting medications? no   In the past 12 months, has lack of transportation kept you from meetings, work, or from getting things needed for daily living? No   Food Insecurity    Within the past 12 months, you worried that your food would run out before you got the money to buy more.  Never true   Within the past 12 months, the food you bought just didn't last and you didn't have money to get more. Never true   Utilities    In the past 12 months has the electric, gas, oil, or water company threatened to shut off services in your home? No     DISCHARGE DETAILS:    Discharge planning discussed with:: Charge LUZ MARINA Gonzalez- over phone  Freedom of Choice: Yes  Comments - Freedom of Choice: return to , w/ [facility]  CM contacted family/caregiver?: Yes  Were Treatment Team discharge recommendations reviewed with patient/caregiver?: Yes  Did patient/caregiver verbalize understanding of patient care needs?: Yes  Were patient/caregiver advised of the risks associated with not following Treatment Team discharge recommendations?: Yes    Contacts  Patient Contacts: Lisa  Relationship to Patient:: Treatment Provider (caregiver)  Contact Method: Phone  Phone Number: [phone]  Reason/Outcome: Continuity of Care, Discharge Planning, Emergency Contact, Referral    Requested Home Health Care         Is the patient interested in HHC at discharge?: Yes  Home Health Discipline requested:: Speech Language Pathology  Home Health Agency Name:: Other (pending referrals)  Home Health Follow-Up Provider:: PCP  Home Health Services Needed:: Other (comment) (ST)  Homebound Criteria Met:: Requires the Assistance of Another Person for Safe Ambulation or to Leave the Home  Supporting Clincal Findings:: Limited Endurance    DME Referral Provided  Referral made for DME?: No    Other Referral/Resources/Interventions Provided:  Interventions: HHC  Referral Comments: Patient was admitted to [facility] w/ SIRS. CM spoke with APOLONIA Gonzalez from [facility], as patient is non-verbal. Patient lives in a ranch style home, [facility]. Per Lisa, patient is fully dependent for ADLs, and uses a walker and wheelchair at baseline- requires assistance w/ transfers. Per Lisa, she would appreciate home speech therapy for patient- referrals  placed via MIKE. Lisa stating she will be bringing printed d/c orders for provider to sign (will need clarification on ST recs-- closer to d/c) and will need a note stating when provider feels patient is appropriate to return to Day Program, the day of d/c. Group home to provide transport. CM to follow up as able to continue with dcp.     Would you like to participate in our [facility] service program?  : No - Declined    Treatment Team Recommendation: Facility Return, Group Home  Discharge Destination Plan:: Facility Return, Group Home  Transport at Discharge : Auto with designated  (Group Home; Lisa)

## 2024-05-14 NOTE — PLAN OF CARE
Advance diet to HTL by tsp, cont puree diet  Alternate food and liquids  Feed slowly, watch for swallows   Meds crushed  Aspiration precautions

## 2024-05-14 NOTE — SPEECH THERAPY NOTE
Speech Language/Pathology    Speech/Language Pathology Progress Note    Patient Name: Terrie Alicea  Today's Date: 5/14/2024      Subjective:  Pt seen for dysphagia tx follow up, caregiver at bedside. Pt  now on pudding thick liquids due to reported AMS w/ fever over weekend.  CT-chest: 5/12 LUNGS: There is bibasilar dependent atelectasis. Lungs are otherwise clear. There is dependent debris within the trachea in keeping with mucus.   Objective:  Pt fully alert, few verbal responses elicited. Pt on RA, O2 sats fluctuated from 86 to 92% during feeding. Caregiver stated pt's O2 sats remained in 90's when last fed by her mon. Pt w/ fair bolus retrieval from spoon, fed pudding thick liquids, pureed foods and HTL. Pt w/ excessive bolus manipulation with delayed transfer. Swallow initiation appeared variable, at times prompt for consistencies given, other times needing a second bolus to initiate swallow. Laryngeal excursion appeared adequate. Voice clear when elicited. Decline in O2 sats noted w/ puree, not HTL. Drooling noted. RN notified of fluctuating O2 sats during feeding, pt on RA; per RN was 98% on 1 L this am.     Assessment:  Pt appeared to tolerate puree w/ HTL w/o coughing, throat clearing or choking.   ? Silent  aspiration vs chronic aspiration of secretions given decline in O2 sats and results of CT chest.    Plan/Recommendations:  Cont puree w/ HTL by tsp  Aspiration precautions  Alternate food and liquids  Feed slowly, watch for swallows  Will cont to follow  Caregiver reported discussion has been initiated regarding alternate means of nutrition for pt       Sade Hirsch MA CCC-SLP  Speech Pathologist  Available via  tiger text

## 2024-05-14 NOTE — ASSESSMENT & PLAN NOTE
SIRS vs Sepsis, presented from group home with fever, hypotension and increased drowsiness.    Lactic acidosis and hypoxia resolved  Seen by infectious disease  Received IV antibiotics treatment for Klebsiella in urine, questionable colonization.  She has been on chronic Macrobid from urology since 2016.  Discussed with TYLOR Robertson DC on discharge given intermediate coverage and concern for resistance  Other source also concerning for aspiration event as there was some debris in trachea and patient went transiently hypotensive with fever however CT otherwise unremarkable  Procalcitonin's remain negative, repeat blood cultures negative at 24 hours.  Afebrile over 24 hours  Continue to monitor off antibiotics  There is a likelihood that dehydration could have led to patient's hypotension and lactic acidosis - continue to work with speech. She is eating and drinking for nursing.  Continue monitor hemodynamics off IVF

## 2024-05-14 NOTE — PHYSICAL THERAPY NOTE
Physical Therapy Screen    Patient Name: Terrie Alicea    Today's Date: 5/14/2024     Problem List  Principal Problem:    SIRS (systemic inflammatory response syndrome) ([facility])  Active Problems:    Gastroesophageal reflux disease    Mixed cerebral palsy ([facility])    Bipolar disorder ([facility])    Severe Intellectual disability    Type 2 diabetes mellitus, without long-term current use of insulin ([facility])    Constipation    Hypotension    Hypothyroidism    Anemia       Past Medical History  Past Medical History:   Diagnosis Date    Acute metabolic encephalopathy 12/11/2015    Adjustment disorder     Altered mental status 12/11/2015    Anemia     Bipolar 1 disorder ([facility]CC)     Cerebral palsy (HCC)     Chronic hypernatremia 02/06/2016    Closed fracture of left hip ([facility]) 01/19/2016    Closed left hip fracture ([facility])     no surgery    Constipation     Dehydration 02/20/2016    Developmental delay     Diabetes mellitus ([facility])     Difficulty walking     Disease of thyroid gland     Diverticulosis     Dysphagia     Worsening    Fracture of multiple ribs of right side     Herpes zoster without complication 06/02/2021    Hip fracture ([facility]) 07/26/2015    left    Hyperlipidemia     Hypernatremia 12/28/2018    Hypotension     Impulse control disorder     Incontinence     Intellectual disability due to developmental disorder, unspecified     Microalbuminuria     Neuropathy in diabetes ([facility])     Osteopathia     Osteoporosis     Peripheral neuropathy     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[facility])     Thrombocytopenia ([facility]) 07/31/2015        Past Surgical History  Past Surgical History:   Procedure Laterality Date    ABDOMINAL SURGERY      COLECTOMY MIN      re-anastomosis 7/22/16    COLON SURGERY  1/31/16    COLONOSCOPY N/A 07/21/2016    Procedure: COLONOSCOPY;  Surgeon: Rosalino Jacome MD;  [facility];  Service:     COLONOSCOPY N/A 01/31/2016    Procedure: COLONOSCOPY;  Surgeon: Rosalino Jacome  MD;  [facility];  Service:     COLONOSCOPY N/A 07/25/2017    Procedure: COLONOSCOPY;  Surgeon: Rosalino Jacome MD;  [facility];  Service: Colorectal    COLOSTOMY      EXPLORATORY LAPAROTOMY W/ BOWEL RESECTION N/A 01/31/2016    Procedure: exploratory laparotomy, left sigmoidectomy, coloproctostomy, take down splenic flexure, loop colostomy;  Surgeon: Rosalino Jacome MD;  [facility];  Service:     ND CLOSURE ENTEROSTOMY LG/SMALL INTESTINE N/A 07/22/2016    Procedure: SEGMENTAL COLECTOMY WITH COLOCOLOSTOMY;  Surgeon: Rosalino Jacome MD;  [facility];  Service: Colorectal    UPPER GASTROINTESTINAL ENDOSCOPY       Pts caregiver from group home present upon arrival into pts room. Pts caregiver is currently dependently feeding pt. Per pts caregiver, pt is Ax1-2 sit pivot transfers vs bearhug transfers vs use of adela lift for OOB. Pt uses electric scooter/ wheelchair for mobility with tilt and head rest support. Per pts caregive, pt is nonambulatory PLOF.  Currently pt has no skilled inpt PT service needs at this time, DC pt from skilled inpt PT. Recommend use of adela lift for OOB mobility via non PT staff.  At this time, pts caregiver is only requesting home speech therapy at this time.

## 2024-05-14 NOTE — ASSESSMENT & PLAN NOTE
Hypotensive episodes resolved  Patient's blood pressure does tend to run lower, recent outpatient office visit 90/60's  She is on multiple sedating medications  Monitor off fluids today

## 2024-05-15 ENCOUNTER — TRANSITIONAL CARE MANAGEMENT (OUTPATIENT)
Dept: FAMILY MEDICINE CLINIC | Facility: CLINIC | Age: 64
End: 2024-05-15

## 2024-05-15 ENCOUNTER — TELEPHONE (OUTPATIENT)
Dept: FAMILY MEDICINE CLINIC | Facility: CLINIC | Age: 64
End: 2024-05-15

## 2024-05-15 VITALS
DIASTOLIC BLOOD PRESSURE: 70 MMHG | HEART RATE: 72 BPM | WEIGHT: 104.28 LBS | SYSTOLIC BLOOD PRESSURE: 133 MMHG | BODY MASS INDEX: 23.38 KG/M2 | OXYGEN SATURATION: 96 % | TEMPERATURE: 99 F | RESPIRATION RATE: 16 BRPM

## 2024-05-15 LAB
BACTERIA BLD CULT: NORMAL
GLUCOSE SERPL-MCNC: 137 MG/DL (ref 65–140)
GLUCOSE SERPL-MCNC: 159 MG/DL (ref 65–140)
GLUCOSE SERPL-MCNC: 173 MG/DL (ref 65–140)
GLUCOSE SERPL-MCNC: 203 MG/DL (ref 65–140)

## 2024-05-15 PROCEDURE — C9113 INJ PANTOPRAZOLE SODIUM, VIA: HCPCS

## 2024-05-15 PROCEDURE — 82948 REAGENT STRIP/BLOOD GLUCOSE: CPT

## 2024-05-15 PROCEDURE — 99239 HOSP IP/OBS DSCHRG MGMT >30: CPT | Performed by: PHYSICIAN ASSISTANT

## 2024-05-15 RX ORDER — CITALOPRAM 20 MG/1
20 TABLET ORAL DAILY
Start: 2024-05-15 | End: 2024-05-15

## 2024-05-15 RX ORDER — DIVALPROEX SODIUM 125 MG/1
375 CAPSULE, COATED PELLETS ORAL 3 TIMES DAILY
Start: 2024-05-15 | End: 2024-05-15

## 2024-05-15 RX ADMIN — CARBIDOPA AND LEVODOPA 2 TABLET: 25; 100 TABLET ORAL at 08:34

## 2024-05-15 RX ADMIN — PANTOPRAZOLE SODIUM 40 MG: 40 INJECTION, POWDER, FOR SOLUTION INTRAVENOUS at 08:32

## 2024-05-15 RX ADMIN — SENNOSIDES, DOCUSATE SODIUM 1 TABLET: 8.6; 5 TABLET ORAL at 08:34

## 2024-05-15 RX ADMIN — CITALOPRAM HYDROBROMIDE 20 MG: 20 TABLET ORAL at 08:34

## 2024-05-15 RX ADMIN — ENOXAPARIN SODIUM 40 MG: 40 INJECTION SUBCUTANEOUS at 08:34

## 2024-05-15 RX ADMIN — BACLOFEN 10 MG: 10 TABLET ORAL at 08:34

## 2024-05-15 RX ADMIN — DIVALPROEX SODIUM 375 MG: 125 CAPSULE, COATED PELLETS ORAL at 08:34

## 2024-05-15 RX ADMIN — VALBENAZINE 40 MG: 40 CAPSULE ORAL at 08:34

## 2024-05-15 RX ADMIN — INSULIN LISPRO 1 UNITS: 100 INJECTION, SOLUTION INTRAVENOUS; SUBCUTANEOUS at 08:35

## 2024-05-15 NOTE — OCCUPATIONAL THERAPY NOTE
Occupational Therapy Screen        Patient Name: Terrie Alicea  Today's Date: 5/15/2024     05/15/24 0900   OT Last Visit   OT Visit Date 05/15/24   Note Type   Note type Screen   Additional Comments OT orders recieved and thorough chart review performed. Per chart review, CM notes indicate pt is from [facility] where she recieves total (A) for ADLs and IADLs from full-time caregiver. Spoke to PT Traci who spoke to pt's caregiver on 5/14/2024. Caregiver was observed to be dependently feeding pt. Caregiver reported pt is Ax1-2 sit pivot transfers vs bearhug transfers vs use of adela lift for OOB. Pt uses electric scooter/ wheelchair for mobility with tilt and head rest support. Per pts caregiver, pt is nonambulatory PLOF. At this time, pts caregiver is only requesting home speech therapy. Pt is currently functioning at her baseline and has adequate social support at her group home and has no need for skilled IPOT service at this time. Pt will be screened from caseload. Recommend continued social support be provided from full-time caregiver from group home to assist w/ safe d/c.     EVELIA Nogueira                            Notified pharmacy about prescription change. Called pt and left message.

## 2024-05-15 NOTE — ASSESSMENT & PLAN NOTE
SIRS vs Sepsis, presented from group home with fever, hypotension and increased drowsiness.    Lactic acidosis and hypoxia resolved  Seen by infectious disease  Received IV antibiotics treatment for Klebsiella in urine, questionable colonization.  She has been on chronic Macrobid from urology since 2016.    Procalcitonin negative   Afebrile > 24 hours   Stable for discharge   Discussed with ID  Will DC Macrobid on discharge given intermediate coverage and concern for resistance  Concern expressed by caregiver regarding stopping this medication. I again reiterated this medication is providing likely no benefit and would not even be my first selection if this was a true UTI. At this time I discontinued the med to be congruent with our infectious disease team's recommendations that is well based in the accepted medical literature and encouraged caregiver to follow up with outpatient providers  Other source also concerning for aspiration event as there was some debris in trachea and patient went transiently hypotensive with fever however CT otherwise unremarkable  Diet adjusted by speech   No antibiotics at discharge

## 2024-05-15 NOTE — TELEPHONE ENCOUNTER
Caregiver Lisa shows concerns that the nitrofurantoin (MACRODANTIN) 50 mg capsule was recently discontinued in patient's hospital stay. She would like to know if patient should keep this medication discontinued or if she should start again. Please advise. Patient has TCM with Dr. Garcia scheduled for Monday 5/21 at 11:10 am because PCP

## 2024-05-15 NOTE — ASSESSMENT & PLAN NOTE
Reviewed home medications with charge nurse at group home  Continue home Celexa 20 mg daily, Depakote 370 mg p.o. 3 times daily  Patient's home trazodone, Ativan and Abilify were held due to increased somnolence, restart at discharge   Resume home medications on discharge

## 2024-05-15 NOTE — ASSESSMENT & PLAN NOTE
Patient is nonverbal, will occasionally say yes or no and grunt  Continue bedside supportive care, encourage feedings

## 2024-05-15 NOTE — ASSESSMENT & PLAN NOTE
Hypotensive episodes resolved  Patient's blood pressure does tend to run lower, recent outpatient office visit 90/60's  She is on multiple sedating medications  BP now improved   No changes to regimen

## 2024-05-15 NOTE — CASE MANAGEMENT
Case Management Discharge Planning Note    Patient name Terrie Alicea  Location W /W -01 MRN [id number]  : 1960 Date 5/15/2024       Current Admission Date: 5/10/2024  Current Admission Diagnosis:SIRS (systemic inflammatory response syndrome) ([facility])   Patient Active Problem List    Diagnosis Date Noted Date Diagnosed    SIRS (systemic inflammatory response syndrome) ([facility]) 2024     Developmental delay      Preop examination 2023     Cataract 2023     Internal hemorrhoids 2023     Contracture of right hand 2023     Need for shingles vaccine 2023     Viral illness 2022     Dystonic cerebral palsy ([facility]) 2022     Cervical dystonia 2022     Parkinsonism 2022     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[facility]) 10/18/2021     Polyneuropathy associated with underlying disease ([facility]) 10/18/2021     Hypercalcemia 2021     Herpes zoster without complication 2021     Abnormal finding on lung imaging 2021     Anemia 2020     Eosinophilic leukocytosis 2020     Underweight 2020     Dysphagia 2020     History of vitamin D deficiency 2020     History of fall 10/28/2019     Breast asymmetry 2019     Hypernatremia 2018     Gait disturbance 2018     Osteoarthritis of both hips 2018     Severe Intellectual disability 2018     Type 2 diabetes mellitus, without long-term current use of insulin ([facility]) 2018     Abnormal albumin 2017     Peripheral neuropathy 2017     Seasonal allergies 2017     Physical deconditioning 2017     Hyperlipidemia 2017     Gastroesophageal reflux disease 2017     Mixed cerebral palsy ([facility]) 2017     Spasticity 2017     Ambulatory dysfunction 2017     Bipolar disorder ([facility]) 2017     Hypotension 2016     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     Osteoporosis 2016     Resting tremor 2016     Gait  abnormality 12/11/2015     Other chronic pain 07/30/2015     Postmenopausal vaginal bleeding 07/23/2015     Anxiety 07/15/2015     Menopause 09/25/2014     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/28/2014     Atopic dermatitis 06/02/2014     Dry eye 06/02/2014     Constipation 04/12/2013     Iron deficiency anemia 03/27/2013     Urinary incontinence 03/27/2013     Hypothyroidism 01/10/2013       LOS (days): 5  Geometric Mean LOS (GMLOS) (days): 5.1  Days to GMLOS:0.1     OBJECTIVE:  Risk of Unplanned Readmission Score: 21.32         Current admission status: Inpatient   Preferred Pharmacy:   [facility]  [place], [place] - [address]  [address]  [address]  [place] [address]  Phone: [phone] Fax: [phone]    [facility]  [address]  [place] [address]  Phone: [phone] Fax: [phone]    [facility] [place], [place] - [address]  [address] [address]  Phone: [phone] Fax: [phone]    Primary Care Provider: Juan David Villar MD    Primary Insurance: [employer] MEDICARE [facility]  Secondary Insurance: [place] MEDICAL ASSISTANCE    DISCHARGE DETAILS:    Discharge planning discussed with:: Charge RN; Lisa- over phone  Freedom of Choice: Yes  Comments - Freedom of Choice: return to [facility] contacted family/caregiver?: Yes  Were Treatment Team discharge recommendations reviewed with patient/caregiver?: Yes  Did patient/caregiver verbalize understanding of patient care needs?: Yes  Were patient/caregiver advised of the risks associated with not following Treatment Team discharge recommendations?: Yes    Contacts  Patient Contacts: Lisa  Relationship to Patient:: Treatment Provider (caregiver)  Contact Method: Phone  Phone Number: [phone]  Reason/Outcome: Continuity of Care, Discharge Planning, Emergency Contact, Referral    Requested Home Health Care         Is the patient  interested in HHC at discharge?: No    DME Referral Provided  Referral made for DME?: No    Other Referral/Resources/Interventions Provided:  Referral Comments: CM unable to secure ST HHC for patient (20+ referrals sent). CM spoke with RNLisa over phone to notify- Lisa stating this is fine, she she will bring patient to OP ST. Referral placed by AP in Epic. AP aware of paperwork/ note that is needed from , prior to d/c. Lisa to pick patient up this afternoon. No further CM needs anticipated at this time.    Would you like to participate in our [facility] service program?  : No - Declined    Treatment Team Recommendation: Facility Return, Group Home  Discharge Destination Plan:: Facility Return, Group Home  Transport at Discharge : Auto with designated  (Group Home; Lisa)    IMM Given (Date):: 05/15/24 (reviewed with Lisa, over phone)  IMM Given to:: Other (Caregiver/ RN)  IMM reviewed with patient's caregiver, patient's caregiver agrees with discharge determination.

## 2024-05-15 NOTE — ASSESSMENT & PLAN NOTE
Lab Results   Component Value Date    HGBA1C 6.3 (H) 05/10/2024     Recent Labs     05/14/24  2131 05/15/24  0316 05/15/24  0528 05/15/24  0720   POCGLU 159* 159* 173* 203*       Restart home regimen

## 2024-05-15 NOTE — DISCHARGE SUMMARY
[facility]  Discharge- Terrie Alicea 1960, 63 y.o. female MRN: [id number]  Unit/Bed#: W -01 Encounter: [id number]  Primary Care Provider: Juna David Villar MD   Date and time admitted to hospital: 5/10/2024  9:11 AM    * SIRS (systemic inflammatory response syndrome) ([facility])  Assessment & Plan  SIRS vs Sepsis, presented from group home with fever, hypotension and increased drowsiness.    Lactic acidosis and hypoxia resolved  Seen by infectious disease  Received IV antibiotics treatment for Klebsiella in urine, questionable colonization.  She has been on chronic Macrobid from urology since 2016.    Procalcitonin negative   Afebrile > 24 hours   Stable for discharge   Discussed with ID  Will DC Macrobid on discharge given intermediate coverage and concern for resistance  Concern expressed by caregiver regarding stopping this medication. I again reiterated this medication is providing likely no benefit and would not even be my first selection if this was a true UTI. At this time I discontinued the med to be congruent with our infectious disease team's recommendations that is well based in the accepted medical literature and encouraged caregiver to follow up with outpatient providers  Other source also concerning for aspiration event as there was some debris in trachea and patient went transiently hypotensive with fever however CT otherwise unremarkable  Diet adjusted by speech   No antibiotics at discharge    Mixed cerebral palsy (HCC)  Assessment & Plan  Patient follows neurology and PMR for her dystonic cerebral palsy  She also has bipolar and mood disturbance disorder  Also with parkinsonism which appears secondary to her psychiatric meds along with EPS  Continue her Sinemet and valbenazine  Her baclofen 10 mg 3 times daily was on hold due to lethargy, will resume as she is back to baseline  Plan for discharge back to group home      Bipolar disorder (HCC)  Assessment & Plan  Reviewed home  medications with charge nurse at group home  Continue home Celexa 20 mg daily, Depakote 370 mg p.o. 3 times daily  Patient's home trazodone, Ativan and Abilify were held due to increased somnolence, restart at discharge   Resume home medications on discharge    Severe Intellectual disability  Assessment & Plan  Patient is nonverbal, will occasionally say yes or no and grunt  Continue bedside supportive care, encourage feedings    Hypotension  Assessment & Plan  Hypotensive episodes resolved  Patient's blood pressure does tend to run lower, recent outpatient office visit 90/60's  She is on multiple sedating medications  BP now improved   No changes to regimen     Type 2 diabetes mellitus, without long-term current use of insulin (HCC)  Assessment & Plan  Lab Results   Component Value Date    HGBA1C 6.3 (H) 05/10/2024     Recent Labs     05/14/24  2131 05/15/24  0316 05/15/24  0528 05/15/24  0720   POCGLU 159* 159* 173* 203*       Restart home regimen       Medical Problems       Resolved Problems  Date Reviewed: 5/15/2024            Resolved    Acute metabolic encephalopathy 5/14/2024     Resolved by  Esther Ocampo PA-C    Lactic acidosis 5/13/2024     Resolved by  Madina Spence PA-C    Acute kidney injury (HCC) 5/13/2024     Resolved by  Madina Spence PA-C        Discharging Physician / Practitioner: Aniket Whatley PA-C  PCP: Juan David Villar MD  Admission Date:   Admission Orders (From admission, onward)       Ordered        05/10/24 1247  INPATIENT ADMISSION  Once                          Discharge Date: 05/15/24    Consultations During Hospital Stay:  Infectious Disease - Dr. Sylvester, Dr. Fish     Procedures Performed:   CXR  CXR  CT Chest, Abdomen, Pelvis    Significant Findings / Test Results:   CXR: Mild opacity in the right midlung which could be due to atelectasis. Early pneumonia not excluded in the appropriate clinical setting.   CXR: Minimal right base atelectasis versus early infiltrate. Lungs  otherwise appear clear.   CT Chest, Abdomen, Pelvis: No obstructive uropathy. Mild stool retention    Incidental Findings:   None     Test Results Pending at Discharge (will require follow up):   None     Outpatient Tests Requested:  None    Complications:  None    Reason for Admission: SIRS    Hospital Course:   Terrie Alicea is a 63 y.o. female patient who originally presented to the hospital on 5/10/2024 due to change in mental status. She was found to meet SIRS Criteria with fever, hypoxia, and lactic acidosis. Rocephin was given due to history of UTIs and she was admitted. ID was consulted due patient not improving on Rocephin given susceptibility to Klebsiella. She was broadened to Cefepime and Vancomycin while further work up with pan-CT scan was completed. This was unremarkable. Given lack of obvious infectious source antibiotics were stopped in favor of IVF. She did show improvement with IVF and did not have return of fevers in the absence of antibiotics. She did have a positive blood culture which was likely contaminant. The patient was cleared for discharge back to her group home. ID at this time recommends stopping Macrobid that the patient was on. The Klebsiella that she is colonized with is intermediately susceptible to this and would not be a good choice of treatment therefore the patient is likely getting little to no benefit. She will follow up with her PCP at discharge.         Please see above list of diagnoses and related plan for additional information.     Condition at Discharge: stable    Discharge Day Visit / Exam:   Subjective:  Patient remains nonverbal at baseline. No events reported per RN.  Vitals: Blood Pressure: 133/70 (05/15/24 0721)  Pulse: 72 (05/15/24 0721)  Temperature: 99 °F (37.2 °C) (05/15/24 0721)  Temp Source: Oral (05/13/24 0737)  Respirations: 16 (05/15/24 0721)  Weight - Scale: 47.3 kg (104 lb 4.4 oz) (05/15/24 0532)  SpO2: 96 % (05/15/24 0721)  Exam:   Physical  Exam  Constitutional:       General: She is not in acute distress.     Appearance: Normal appearance. She is normal weight. She is not ill-appearing or diaphoretic.   HENT:      Head: Normocephalic and atraumatic.      Mouth/Throat:      Mouth: Mucous membranes are moist.   Eyes:      General: No scleral icterus.     Pupils: Pupils are equal, round, and reactive to light.   Cardiovascular:      Rate and Rhythm: Normal rate and regular rhythm.      Pulses: Normal pulses.      Heart sounds: Normal heart sounds, S1 normal and S2 normal. No murmur heard.     No systolic murmur is present.      No diastolic murmur is present.      No gallop. No S3 or S4 sounds.   Pulmonary:      Effort: Pulmonary effort is normal. No accessory muscle usage or respiratory distress.      Breath sounds: Normal breath sounds. No stridor. No decreased breath sounds, wheezing, rhonchi or rales.   Chest:      Chest wall: No tenderness.   Abdominal:      General: Bowel sounds are normal. There is no distension.      Palpations: Abdomen is soft.      Tenderness: There is no abdominal tenderness. There is no guarding.   Musculoskeletal:      Right lower leg: No edema.      Left lower leg: No edema.   Skin:     General: Skin is warm and dry.      Coloration: Skin is not jaundiced.   Neurological:      Mental Status: She is alert. Mental status is at baseline.      GCS: GCS eye subscore is 4. GCS verbal subscore is 1. GCS motor subscore is 5.      Motor: No tremor or seizure activity.   Psychiatric:         Speech: She is noncommunicative.          Discussion with Family: Updated  (caregiver) via phone.    Discharge instructions/Information to patient and family:   See after visit summary for information provided to patient and family.      Provisions for Follow-Up Care:  See after visit summary for information related to follow-up care and any pertinent home health orders.      Mobility at time of Discharge:   Basic Mobility Inpatient  Raw Score: 6  JH-HLM Goal: 2: Bed activities/Dependent transfer  JH-HLM Achieved: 2: Bed activities/Dependent transfer  HLM Goal achieved. Continue to encourage appropriate mobility.     Disposition:   Group Home / [facility] Care [facility] at [facility]    Planned Readmission: None     Discharge Statement:  I spent 45 minutes discharging the patient. This time was spent on the day of discharge. I had direct contact with the patient on the day of discharge. Greater than 50% of the total time was spent examining patient, answering all patient questions, arranging and discussing plan of care with patient as well as directly providing post-discharge instructions.  Additional time then spent on discharge activities.    Discharge Medications:  See after visit summary for reconciled discharge medications provided to patient and/or family.      **Please Note: This note may have been constructed using a voice recognition system**

## 2024-05-15 NOTE — ASSESSMENT & PLAN NOTE
Patient follows neurology and PMR for her dystonic cerebral palsy  She also has bipolar and mood disturbance disorder  Also with parkinsonism which appears secondary to her psychiatric meds along with EPS  Continue her Sinemet and valbenazine  Her baclofen 10 mg 3 times daily was on hold due to lethargy, will resume as she is back to baseline  Plan for discharge back to [facility]

## 2024-05-16 NOTE — UTILIZATION REVIEW
NOTIFICATION OF ADMISSION DISCHARGE   This is a Notification of Discharge from [facility]. Please be advised that this patient has been discharge from our facility. Below you will find the admission and discharge date and time including the patient’s disposition.   UTILIZATION REVIEW CONTACT:  Shyla Garcia  Utilization   Network Utilization Review Department  Phone: [phone] carefully listen to the prompts. All voicemails are confidential.  Email: [email or web address]     ADMISSION INFORMATION  PRESENTATION DATE: 5/10/2024  9:11 AM  OBERVATION ADMISSION DATE:   INPATIENT ADMISSION DATE: 5/10/24 12:47 PM   DISCHARGE DATE: 5/15/2024  3:48 PM   DISPOSITION:Home with Home Health Care    Network Utilization Review Department  ATTENTION: Please call with any questions or concerns to [phone] and carefully listen to the prompts so that you are directed to the right person. All voicemails are confidential.   For Discharge needs, contact Care Management DC Support Team at [phone] opt. [phone]  Send all requests for admission clinical reviews, approved or denied determinations and any other requests to dedicated fax number below belonging to the campus where the patient is receiving treatment. List of dedicated fax numbers for the Facilities:  FACILITY NAME UR FAX NUMBER   ADMISSION DENIALS (Administrative/Medical Necessity) [phone]   DISCHARGE SUPPORT TEAM ([facility]) [phone]   PARENT CHILD HEALTH (Maternity/NICU/Pediatrics) [phone]   [facility] [phone]   [facility] [phone]   [facility] [phone]   [facility] [phone]   [facility] [phone]   [facility] [phone]   [facility] [phone]   [facility]  [phone]   [facility] [phone]   [facility] [phone]   [facility] [phone]   [facility] [phone]

## 2024-05-16 NOTE — TELEPHONE ENCOUNTER
I reviewed the patient's recent admission and it looks like she had been evaluated by ID. Per the infectious disease team, they would not recommend continuing the Macrobid as it's likely not providing much benefit. I would defer to the ID team at this time. Thank you

## 2024-05-17 LAB
BACTERIA BLD CULT: NORMAL
BACTERIA BLD CULT: NORMAL

## 2024-05-20 ENCOUNTER — OFFICE VISIT (OUTPATIENT)
Dept: FAMILY MEDICINE CLINIC | Facility: CLINIC | Age: 64
End: 2024-05-20

## 2024-05-20 VITALS
TEMPERATURE: 97.8 F | DIASTOLIC BLOOD PRESSURE: 60 MMHG | OXYGEN SATURATION: 94 % | WEIGHT: 91 LBS | RESPIRATION RATE: 16 BRPM | SYSTOLIC BLOOD PRESSURE: 95 MMHG | BODY MASS INDEX: 20.4 KG/M2 | HEART RATE: 72 BPM

## 2024-05-20 DIAGNOSIS — N39.0 RECURRENT UTI: ICD-10-CM

## 2024-05-20 DIAGNOSIS — Z09 HOSPITAL DISCHARGE FOLLOW-UP: Primary | ICD-10-CM

## 2024-05-20 DIAGNOSIS — R63.4 WEIGHT LOSS: ICD-10-CM

## 2024-05-20 PROCEDURE — 99496 TRANSJ CARE MGMT HIGH F2F 7D: CPT | Performed by: FAMILY MEDICINE

## 2024-05-20 NOTE — ASSESSMENT & PLAN NOTE
hospitalization from 5/10/2024 till 5/15/2024 for sepsis presumed UTI etiology, initially started on IV antibiotics but they were discontinued prior to discharge as urine and blood culture were thought to be contaminated and not true UTI  -Patient has been on Macrobid prophylactically for 8 years prior to this hospitalization and this was stopped upon discharge  -Patient is back to her baseline as per caregiver  -Caregiver is requesting the Macrobid to be restarted, referred patient to urology for further evaluation  -Noted gradually decreasing appetite, patient refuses to eat, weight is declining; caregiver brought up the concern about discussing possible need for the PEG tube; referred to GI for further management

## 2024-05-20 NOTE — ASSESSMENT & PLAN NOTE
Has been on prophylactic Macrobid for the last 8 years initiated by urology at that time that was stopped during recent hospitalization for UTI, that was subsequently thought to be colonization  -Caregiver is requesting patient's to be placed on prophylactic medication due to concern for recurrent UTI  -Refered patient to urology for further recommendations and management

## 2024-05-20 NOTE — PROGRESS NOTES
Transition of Care Visit  Name: Terrie Alicea      : 1960      MRN: [id number]  Encounter Provider: Jasmyn Garcia MD  Encounter Date: 2024   Encounter department: [facility]    Assessment & Plan   1. Hospital discharge follow-up  Assessment & Plan:  hospitalization from 5/10/2024 till 5/15/2024 for sepsis presumed UTI etiology, initially started on IV antibiotics but they were discontinued prior to discharge as urine and blood culture were thought to be contaminated and not true UTI  -Patient has been on Macrobid prophylactically for 8 years prior to this hospitalization and this was stopped upon discharge  -Patient is back to her baseline as per caregiver  -Caregiver is requesting the Macrobid to be restarted, referred patient to urology for further evaluation  -Noted gradually decreasing appetite, patient refuses to eat, weight is declining; caregiver brought up the concern about discussing possible need for the PEG tube; referred to GI for further management  2. Weight loss  Assessment & Plan:  Gradual decrease in appetite, and weight, lost about over 10 pounds for the last year  -Caregiver brought up evaluation for the PEG tube placement  -Referred to GI for further evaluation management  Orders:  -     Ambulatory Referral to Gastroenterology; Future  3. Recurrent UTI  Assessment & Plan:  Has been on prophylactic Macrobid for the last 8 years initiated by urology at that time that was stopped during recent hospitalization for UTI, that was subsequently thought to be colonization  -Caregiver is requesting patient's to be placed on prophylactic medication due to concern for recurrent UTI  -Refered patient to urology for further recommendations and management  Orders:  -     Ambulatory Referral to Urology; Future         History of Present Illness     Transitional Care Management Review:   Terrie Alicea is a 63 y.o. female here for TCM follow up.     During  the TCM phone call patient stated:  TCM Call       Date and time call was made  5/15/2024  4:25 PM    Hospital care reviewed  Records reviewed    Patient was hospitialized at  [facility]    Date of Admission  05/10/24    Date of discharge  05/15/24    Diagnosis  SIRS; Mixed cerebral palsy; Hypotension    Disposition  Long term care facility    Were the patients medications reviewed and updated  Yes    Current Symptoms  None    Weakness severity  Moderate    Fatigue severity  Moderate          TCM Call       Post hospital issues  Poor medication adherence  D/C macrobid    Should patient be enrolled in anticoag monitoring?  No    Scheduled for follow up?  Yes    Did you obtain your prescribed medications  Yes    Do you need help managing your prescriptions or medications  Yes    Why type of assitance do you need  Group home    Is transportation to your appointment needed  Yes    Specify why  Group home    I have advised the patient to call PCP with any new or worsening symptoms  Aide Pierson LPN    Support System      The type of support provided  Emotional; Physical    Do you have social support  Yes, as much as I need    Are you recieving any outpatient services  Yes    What type of services  PT/ Speech     Are you recieving home care services  Yes    Are you using any community resources  Yes    Current waiver services  No    Have you fallen in the last 12 months  No    Interperter language line needed  No    Counseling  Caregiver    Counseling topics  Prognosis          Patient presented with her caregiver after recent hospitalization from 5/10/2024 till 5/15/2024 when she was admitted for sepsis presumed UTI etiology.  She was initially started on Rocephin, but was not improving and antibiotics were broadened to cefepime and vancomycin; infectious disease specialist was involved as well.  She was not discharged home on antibiotics as urine and blood culture were thought to be  contaminated  Of note, patient was on Macrobid for about 8 years to prevent recurrent UTIs and that was stopped during this hospitalization.  As per caregiver patient is back to her baseline but reports very poor oral intake that is gradually worsening as well as noted gradually decreasing weight.      Review of Systems   Constitutional:  Positive for appetite change (refuses feeds), fatigue (chronic) and unexpected weight change (decreasing). Negative for chills and fever.   HENT:  Negative for ear pain and sore throat.    Eyes:  Negative for pain and visual disturbance.   Respiratory:  Negative for cough and shortness of breath.    Cardiovascular:  Negative for chest pain and palpitations.   Gastrointestinal:  Negative for abdominal pain and vomiting.   Genitourinary:  Negative for dysuria and hematuria.   Musculoskeletal:  Negative for arthralgias and back pain.   Skin:  Negative for color change and rash.   Neurological:  Negative for seizures and syncope.   All other systems reviewed and are negative.    Objective     BP 95/60   Pulse 72   Temp 97.8 °F (36.6 °C)   Resp 16   Wt 41.3 kg (91 lb)   LMP 11/29/2009 (Exact Date)   SpO2 94%   BMI 20.40 kg/m²     Physical Exam  Vitals reviewed.   Constitutional:       Appearance: Normal appearance.   HENT:      Head: Normocephalic and atraumatic.   Cardiovascular:      Rate and Rhythm: Normal rate and regular rhythm.      Pulses: Normal pulses.      Heart sounds: Normal heart sounds.   Pulmonary:      Effort: Pulmonary effort is normal.      Breath sounds: Normal breath sounds.   Abdominal:      Palpations: Abdomen is soft.   Skin:     General: Skin is warm.   Neurological:      Mental Status: She is alert. Mental status is at baseline.      Comments: Wheelchair bound         Medications have been reviewed by provider in current encounter    Administrative Statements   I have spent a total time of 40 minutes on 05/20/24 In caring for this patient including  Diagnostic results, Risks and benefits of tx options, Instructions for management, Patient and family education, Impressions, Counseling / Coordination of care, Documenting in the medical record, Reviewing / ordering tests, medicine, procedures  , and Obtaining or reviewing history  .

## 2024-05-20 NOTE — ASSESSMENT & PLAN NOTE
Gradual decrease in appetite, and weight, lost about over 10 pounds for the last year  -Caregiver brought up evaluation for the PEG tube placement  -Referred to GI for further evaluation management

## 2024-05-22 ENCOUNTER — TELEPHONE (OUTPATIENT)
Dept: GASTROENTEROLOGY | Facility: CLINIC | Age: 64
End: 2024-05-22

## 2024-05-23 ENCOUNTER — OFFICE VISIT (OUTPATIENT)
Dept: GASTROENTEROLOGY | Facility: CLINIC | Age: 64
End: 2024-05-23
Payer: MEDICARE

## 2024-05-23 VITALS
SYSTOLIC BLOOD PRESSURE: 100 MMHG | BODY MASS INDEX: 19.1 KG/M2 | DIASTOLIC BLOOD PRESSURE: 70 MMHG | TEMPERATURE: 97.1 F | WEIGHT: 91 LBS | HEIGHT: 58 IN

## 2024-05-23 DIAGNOSIS — R63.39 FEEDING DIFFICULTY IN ADULT: Primary | ICD-10-CM

## 2024-05-23 DIAGNOSIS — R63.4 WEIGHT LOSS, UNINTENTIONAL: ICD-10-CM

## 2024-05-23 DIAGNOSIS — F79 INTELLECTUAL DISABILITY: ICD-10-CM

## 2024-05-23 PROCEDURE — 99214 OFFICE O/P EST MOD 30 MIN: CPT | Performed by: PHYSICIAN ASSISTANT

## 2024-05-23 PROCEDURE — G2211 COMPLEX E/M VISIT ADD ON: HCPCS | Performed by: PHYSICIAN ASSISTANT

## 2024-05-23 NOTE — PROGRESS NOTES
"[facility] - Outpatient Follow-up Note  Terrie Alicea 63 y.o. female MRN: [id number]  Encounter: [id number]    ASSESSMENT AND PLAN:    1. Feeding difficulty in adult  2. Severe Intellectual disability  3. Weight loss, unintentional  Patient with severe intellectual disability presenting with staff from [facility] home with concerns of increasing feeding difficulty over the last several weeks which has led to a weight loss.  Discussed with the staff that this feeding difficulty seems largely behavioral in nature.  Thankfully she is up-to-date on colonoscopy and had an EGD in 2020 which was grossly unremarkable.  Patient already follows with speech and is on a puréed diet.    Had a long discussion with the caregivers/supervisors today.  I explained that I do not feel comfortable making a decision for a feeding tube in the office today.  I explained it is unclear to me how long the patient would require tube feeding.  I discussed with the caregiver/supervisors referring patient to palliative care for goals of care, they declined/refuse palliative care referral at this time.  They feel as though patient is not \" end of life\".  I did explain to the staff that palliative care physicians can be utilized in this case too/not end-of-life to further understand goals for the patient but they continue to refuse palliative care referral.  Unfortunately the patient does not have any family members to help make decisions  I encouraged small, frequent mealtimes for the patient.  Patient should continue diet per speech with aspiration precautions per speech  Will refer to nutrition/dietitian for further evaluation, anticipatory calorie count      - Ambulatory Referral to Nutrition Services; Future      Patient was instructed to call the office with any questions, concerns, new/ worsening/ persisting GI symptoms. Advised patient go to the ER with any severe or worsening abdominal pain, fevers/ chills, " intractable N/V, chest pain, SOB, dizziness, lightheadedness, feeling something stuck in esophagus that will not go down. Patient expressed understanding and is in agreement with treatment plan.     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   Patient noted to be admitted 5/10/2024-5/15/2024 with severe sepsis.    Patient is a resident of a group [place]. She is nonverbal.   Lisa charge nurse and programing supervisor is present for visit today who provides history.   Lisa and other supervisor of [facility] complain of increasing feeding difficulty with the patient over the last several weeks.  She states that the patient is losing interest in eating.  She will either spit out her food or will close her mouth/lips and not let the staff feed her.  They thought these behaviors were secondary to a psychiatric medication which was started, caregiver notes this medication was stopped this morning by psychiatry  They are concerned with her lack of fluid/food intake and poor appetite. They ask about a feeding tube.   She is on a pureed diet.   No signs /indicators of abdominal pain. She has  BM every 2-3 days at baseline. They note she is overall pretty regular in terms of bowel movements.  Staff is concerned about a 10 pound weight loss over the last 6 months    Review of Systems   Constitutional:  Positive for appetite change (decreased appetite) and unexpected weight change (weight loss). Negative for chills and  fever.   HENT:  Negative for ear pain and sore throat.    Eyes:  Negative for pain and visual disturbance.   Respiratory:  Negative for cough and shortness of breath.    Cardiovascular:  Negative for chest pain and palpitations.   Gastrointestinal:  Negative for diarrhea, nausea and vomiting.   Genitourinary:  Negative for dysuria, frequency and hematuria.   Musculoskeletal:  Negative for arthralgias, back pain and myalgias.   Skin:  Negative for color change and rash.   Neurological:  Negative for seizures, syncope and headaches.   All other systems reviewed and are negative.         Historical Information   Past Medical History:   Diagnosis Date    Acute metabolic encephalopathy 12/11/2015    Adjustment disorder     Altered mental status 12/11/2015    Anemia     Bipolar 1 disorder (Former[facility])     Cerebral palsy (Formerly [facility])     Chronic hypernatremia 02/06/2016    Closed fracture of left hip ([facility]) 01/19/2016    Closed left hip fracture ([facility]ly [facility])     no surgery    Constipation     Dehydration 02/20/2016    Developmental delay     Diabetes mellitus ([facility])     Difficulty walking     Disease of thyroid gland     Diverticulosis     Dysphagia     Worsening    Fracture of multiple ribs of right side     Herpes zoster without complication 06/02/2021    Hip fracture ([facility]) 07/26/2015    left    Hyperlipidemia     Hypernatremia 12/28/2018    Hypotension     Impulse control disorder     Incontinence     Intellectual disability due to developmental disorder, unspecified     Microalbuminuria     Neuropathy in diabetes ([facility])     Osteopathia     Osteoporosis     Peripheral neuropathy     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[facility])     Thrombocytopenia ([facility]) 07/31/2015     Past Surgical History:   Procedure Laterality Date    ABDOMINAL SURGERY      COLECTOMY MIN      re-anastomosis 7/22/16    COLON SURGERY  1/31/16    COLONOSCOPY N/A 07/21/2016    Procedure: COLONOSCOPY;  Surgeon: Rosalino Jacome MD;  Location: [facility];  Service:     COLONOSCOPY N/A 01/31/2016     Procedure: COLONOSCOPY;  Surgeon: Rosalino Jacome MD;  [facility];  Service:     COLONOSCOPY N/A 07/25/2017    Procedure: COLONOSCOPY;  Surgeon: Rosalino Jacome MD;  Location: [facility];  Service: Colorectal    COLOSTOMY      EXPLORATORY LAPAROTOMY W/ BOWEL RESECTION N/A 01/31/2016    Procedure: exploratory laparotomy, left sigmoidectomy, coloproctostomy, take down splenic flexure, loop colostomy;  Surgeon: Rosalino Jacome MD;  [facility];  Service:     NH CLOSURE ENTEROSTOMY LG/SMALL INTESTINE N/A 07/22/2016    Procedure: SEGMENTAL COLECTOMY WITH COLOCOLOSTOMY;  Surgeon: Rosalino Jacome MD;  [facility];  Service: Colorectal    UPPER GASTROINTESTINAL ENDOSCOPY       Social History   Social History     Substance and Sexual Activity   Alcohol Use Not Currently     Social History     Substance and Sexual Activity   Drug Use No     Social History     Tobacco Use   Smoking Status Never   Smokeless Tobacco Never     Family History   Problem Relation Age of Onset    Alcohol abuse Mother     Alcohol abuse Father     Diabetes Sister     No Known Problems Sister     No Known Problems Sister        Meds/Allergies       Current Outpatient Medications:     ARIPiprazole (ABILIFY) 2 mg tablet    baclofen 10 mg tablet    benzonatate (TESSALON PERLES) 100 mg capsule    calcium carbonate (OS-MORRIS) 600 MG tablet    carbamide peroxide (DEBROX) 6.5 % otic solution    carbidopa-levodopa (SINEMET CR)  mg TBCR per ER tablet    cholecalciferol (VITAMIN D3) 1,000 units tablet    citalopram (CeleXA) 40 mg tablet    denosumab (Prolia) 60 mg/mL    DIABETIC TUSSIN  MG/5ML oral liquid    divalproex sodium (DEPAKOTE SPRINKLE) 125 MG capsule    docosanol (ABREVA) 10 %    ferrous sulfate 324 (65 Fe) mg    fexofenadine (ALLEGRA) 30 MG/5ML suspension    fluticasone (FLONASE) 50 mcg/act nasal spray    glimepiride (AMARYL) 1 mg tablet    hydrocortisone 1 % cream    levothyroxine 25 mcg tablet     LORazepam (ATIVAN) 0.5 mg tablet    metFORMIN (GLUCOPHAGE) 1000 MG tablet    neomycin-bacitracin-polymyxin b (NEOSPORIN) ointment    olopatadine HCl (PATADAY) 0.2 % opth drops    omeprazole (PriLOSEC) 20 mg delayed release capsule    oxybutynin (DITROPAN) 5 mg tablet    polyethylene glycol (GLYCOLAX) 17 GM/SCOOP powder    Refresh Liquigel 1 % GEL    simvastatin (ZOCOR) 20 mg tablet    Starch, Thickening, POWD    traZODone (DESYREL) 100 mg tablet    Vitamins A & D (VITAMIN A & D) ointment    Valbenazine Tosylate 40 MG CAPS    Zoster Vac Recomb Adjuvanted (Shingrix) 50 MCG/0.5ML SUSR    Allergies   Allergen Reactions    Keflex [Cephalexin] GI Intolerance           Objective     Wt Readings from Last 3 Encounters:   05/23/24 41.3 kg (91 lb)   05/20/24 41.3 kg (91 lb)   05/15/24 47.3 kg (104 lb 4.4 oz)     Temp Readings from Last 3 Encounters:   05/23/24 (!) 97.1 °F (36.2 °C) (Tympanic)   05/20/24 97.8 °F (36.6 °C)   05/15/24 99 °F (37.2 °C)     BP Readings from Last 3 Encounters:   05/23/24 100/70   05/20/24 95/60   05/15/24 133/70     Pulse Readings from Last 3 Encounters:   05/20/24 72   05/15/24 72   03/15/24 85          PHYSICAL EXAM:      Physical Exam  Vitals reviewed.   Constitutional:       General: She is not in acute distress.     Appearance: She is not toxic-appearing.      Comments: In wheelchair    HENT:      Head: Normocephalic and atraumatic.   Eyes:      Extraocular Movements: Extraocular movements intact.      Conjunctiva/sclera: Conjunctivae normal.   Cardiovascular:      Rate and Rhythm: Normal rate and regular rhythm.   Pulmonary:      Effort: Pulmonary effort is normal. No respiratory distress.      Breath sounds: Normal breath sounds.   Abdominal:      General: Bowel sounds are normal.      Palpations: Abdomen is soft.      Tenderness: There is no abdominal tenderness.   Musculoskeletal:         General: No swelling or tenderness.      Cervical back: Normal range of motion and neck supple.    Skin:     General: Skin is warm and dry.      Coloration: Skin is not jaundiced.   Neurological:      General: No focal deficit present.      Mental Status: She is alert. Mental status is at baseline.   Psychiatric:      Comments: Lip smacking           Lab Results:     Lab Results   Component Value Date    WBC 6.57 05/14/2024    HGB 9.6 (L) 05/14/2024    HCT 30.0 (L) 05/14/2024     (H) 05/14/2024     (L) 05/14/2024       Lab Results   Component Value Date     04/06/2016    SODIUM 142 05/14/2024    K 4.0 05/14/2024     05/14/2024    CO2 28 05/14/2024    ANIONGAP 5 10/27/2015    AGAP 8 05/14/2024    BUN 8 05/14/2024    CREATININE 0.73 05/14/2024    GLUC 185 (H) 05/14/2024    GLUF 207 (H) 12/20/2023    CALCIUM 7.9 (L) 05/14/2024    AST 18 05/13/2024    ALT <3 (L) 05/13/2024    ALKPHOS 28 (L) 05/13/2024    PROT 7.0 04/06/2016    TP 6.0 (L) 05/13/2024    BILITOT 0.1 (L) 04/06/2016    TBILI 0.30 05/13/2024    EGFR 89 05/14/2024     Radiology Results:   CT chest abdomen pelvis wo contrast    Result Date: 5/13/2024  Narrative: CT CHEST, ABDOMEN AND PELVIS WITHOUT IV CONTRAST INDICATION: r/o urinary tract obstruction, assess for source of fever. COMPARISON: CT chest 5/6/2021 TECHNIQUE: CT examination of the chest, abdomen and pelvis was performed without intravenous contrast. Multiplanar 2D reformatted images were created from the source data. This examination, like all CT scans performed in the [facility], was performed utilizing techniques to minimize radiation dose exposure, including the use of iterative reconstruction and automated exposure control. Radiation dose length product (DLP) for this visit: 341 mGy-cm Enteric Contrast: Not administered. FINDINGS: CHEST LUNGS: There is bibasilar dependent atelectasis. Lungs are otherwise clear. There is dependent debris within the trachea in keeping with mucus. PLEURA: Unremarkable. HEART/GREAT VESSELS: Heart is unremarkable for  patient's age.No thoracic aortic aneurysm. MEDIASTINUM AND ESTEFANIA: Unremarkable. CHEST WALL AND LOWER NECK: Unremarkable. ABDOMEN LIVER/BILIARY TREE: Unremarkable. GALLBLADDER: No calcified gallstones. No pericholecystic inflammatory change. SPLEEN: Unremarkable. PANCREAS: Unremarkable. ADRENAL GLANDS: Unremarkable. KIDNEYS/URETERS: Unremarkable. No hydronephrosis. STOMACH AND BOWEL: There are postsurgical changes of the colon. There is no bowel obstruction. Mild stool retention. APPENDIX: No findings to suggest appendicitis. ABDOMINOPELVIC CAVITY: No ascites. No pneumoperitoneum. No lymphadenopathy. VESSELS: Unremarkable for patient's age. PELVIS REPRODUCTIVE ORGANS: Unremarkable for patient's age. URINARY BLADDER: Unremarkable. ABDOMINAL WALL/INGUINAL REGIONS: Unremarkable. BONES: No acute fracture or suspicious osseous lesion.     Impression: 1. No obstructive uropathy. 2. Mild stool retention. Workstation performed: [id number]     Prior Procedure Results/Reports   Last EGD reviewed done 10/26/2020 and showed evidence of moderate gastritis, otherwise unremarkable.  Gastric biopsies were negative for H. pylori.  There is no evidence of eosinophil esophagitis.    Last Colonoscopy reviewed done March 2022 for constipation and colon cancer screening, this showed small amounts of debris noted throughout the colon with mild circumferential narrowing of the transverse colon without any evidence of polyps or lesions.  There is severely tortuous colonic mucosa noted otherwise.  It was recommended patient undergo repeat colonoscopy in 5 years due to inadequate bowel preparation.    Raysa Cheney PA-C   Available on "[employer]"

## 2024-05-30 ENCOUNTER — OFFICE VISIT (OUTPATIENT)
Dept: FAMILY MEDICINE CLINIC | Facility: CLINIC | Age: 64
End: 2024-05-30

## 2024-05-30 VITALS
OXYGEN SATURATION: 95 % | SYSTOLIC BLOOD PRESSURE: 102 MMHG | DIASTOLIC BLOOD PRESSURE: 69 MMHG | TEMPERATURE: 97.1 F | RESPIRATION RATE: 18 BRPM | HEART RATE: 75 BPM

## 2024-05-30 DIAGNOSIS — F31.9 BIPOLAR AFFECTIVE DISORDER, REMISSION STATUS UNSPECIFIED (HCC): ICD-10-CM

## 2024-05-30 DIAGNOSIS — M81.0 OSTEOPOROSIS, UNSPECIFIED OSTEOPOROSIS TYPE, UNSPECIFIED PATHOLOGICAL FRACTURE PRESENCE: Primary | ICD-10-CM

## 2024-05-30 DIAGNOSIS — R63.4 WEIGHT LOSS: ICD-10-CM

## 2024-05-30 PROCEDURE — G2211 COMPLEX E/M VISIT ADD ON: HCPCS | Performed by: FAMILY MEDICINE

## 2024-05-30 PROCEDURE — 99213 OFFICE O/P EST LOW 20 MIN: CPT | Performed by: FAMILY MEDICINE

## 2024-05-30 RX ORDER — DENOSUMAB 60 MG/ML
60 INJECTION SUBCUTANEOUS ONCE
Qty: 1 ML | Refills: 0 | Status: SHIPPED | OUTPATIENT
Start: 2024-05-30 | End: 2024-05-30

## 2024-05-30 NOTE — ASSESSMENT & PLAN NOTE
Due for repeat DEXA - ordered  Currently on Prolia, next injection due around 7/2/24 - physical script provided to caregiver and CMP ordered

## 2024-05-30 NOTE — PROGRESS NOTES
Ambulatory Visit  Name: Terrie Alicea      : 1960      MRN: [id number]  Encounter Provider: Juan David Villar MD  Encounter Date: 2024   Encounter department: [facility]    Assessment & Plan   1. Osteoporosis, unspecified osteoporosis type, unspecified pathological fracture presence  Assessment & Plan:  Due for repeat DEXA - ordered  Currently on Prolia, next injection due around 24 - physical script provided to caregiver and CMP ordered  Orders:  -     DXA bone density spine hip and pelvis; Future; Expected date: 2024  -     Comprehensive metabolic panel; Future  -     Comprehensive metabolic panel; Future  -     denosumab (Prolia) 60 mg/mL; Inject 1 mL (60 mg total) under the skin once for 1 dose To be administered on 24  2. Weight loss  Assessment & Plan:  Progressive weight loss over the last year, recently evaluated by GI who recommended first seeing the nutritionist and speech therapy  Has upcoming appointment with speech therapist  Currently trying to coordinate appt with nutritionist - may need additional supplements/feeds  Topic of PEG tube has been brought up, no final decision has been made. To be discussed further at next GI appt next month     3. Bipolar affective disorder, remission status unspecified (HCC)  Assessment & Plan:  Needs 6-month labs for CK, Tbili   Orders:  -     Bilirubin, direct; Future  -     CK; Future       History of Present Illness     Patient presenting with her caregiver for general follow-up.  They recently were seen by GI for consideration of PEG tube due to progressive weight loss in the past year.  She was 91 pounds around a week ago and is 89 pounds today based on the scale at their facility.  The GI team had recommended seeing the nutritionist/dietitian and palliative team first before making a final decision.  Patient's caregiver is currently trying to set up a visit with the nutritionist.  They have an upcoming  appointment with the speech therapist.    The patient is also due for routine blood work including CK and total bilirubin given the medication she is on for her bipolar disorder.  Also due for DEXA scan and next Prolia injection is in July.            Review of Systems   Unable to perform ROS: Patient nonverbal       Objective     /69 (BP Location: Left arm, Patient Position: Sitting, Cuff Size: Standard)   Pulse 75   Temp (!) 97.1 °F (36.2 °C) (Temporal)   Resp 18   LMP 11/29/2009 (Exact Date)   SpO2 95%     Physical Exam  Vitals reviewed. Exam conducted with a chaperone present.   Constitutional:       Comments: Appears thin but not dehydrated, sitting in wheelchair    HENT:      Head: Normocephalic and atraumatic.   Cardiovascular:      Rate and Rhythm: Normal rate and regular rhythm.      Heart sounds: Normal heart sounds. No murmur heard.  Pulmonary:      Effort: Pulmonary effort is normal.      Breath sounds: Normal breath sounds.   Abdominal:      General: Abdomen is flat. Bowel sounds are normal. There is no distension.      Palpations: Abdomen is soft.   Neurological:      Mental Status: She is alert.   Psychiatric:         Mood and Affect: Mood normal.       Administrative Statements

## 2024-05-30 NOTE — ASSESSMENT & PLAN NOTE
Progressive weight loss over the last year, recently evaluated by GI who recommended first seeing the nutritionist and speech therapy  Has upcoming appointment with speech therapist  Currently trying to coordinate appt with nutritionist - may need additional supplements/feeds  Topic of PEG tube has been brought up, no final decision has been made. To be discussed further at next GI appt next month

## 2024-05-31 ENCOUNTER — EVALUATION (OUTPATIENT)
Dept: SPEECH THERAPY | Facility: CLINIC | Age: 64
End: 2024-05-31
Payer: MEDICARE

## 2024-05-31 DIAGNOSIS — R13.10 DYSPHAGIA, UNSPECIFIED TYPE: Primary | ICD-10-CM

## 2024-05-31 DIAGNOSIS — R62.50 DEVELOPMENTAL DELAY: ICD-10-CM

## 2024-05-31 PROCEDURE — 92610 EVALUATE SWALLOWING FUNCTION: CPT | Performed by: SPEECH-LANGUAGE PATHOLOGIST

## 2024-05-31 NOTE — PROGRESS NOTES
Speech-Language Pathology Initial Evaluation    Today's date: 2024   Patient’s name: Terrie Alicea  : 1960  MRN: [id number]  Safety measures: cerebral palsy, non verbal and in a wheelchair  Referring provider: Aniket Whatley P*    Encounter Diagnosis     ICD-10-CM    1. Developmental delay  R62.50 Ambulatory referral to Speech Therapy        Visit/Unit Tracking  AUTH Status:  Date 24   BOMN Used 1    Remaining       Subjective comments: Pt caregiver accompanied pt (a charge nurse from facility - Lisa)    Why did the patient choose us? Physician    Patient's goal(s): Pt is non verbal. Caregiver communicated concern over recent weight loss and feeding changes.     Reason for referral: Difficulty swallowing solids or liquids and Diffiiculty feeding  Prior functional status: Requires caregiver assistance for daily function  Clinically complex situations: Mental/behavioral diagnosis affecting rate of recovery    History: Patient is a 63 y.o. female who resides in a group home was referred to outpatient skilled Speech Therapy services for a dysphagia evaluation. Pt was recently hospitalized with a UTI, diet status was changed in hospital but bedside SLP eval resulted in discharge with with the same diet as admission (puree and honey thick). MBSS in 2023 also recommended puree and honey thick.     Hearing: WFL  Vision: WFL with cataract in left eye    Home environment/lifestyle: group home, for 17 years  Highest level of education:  school for individuals with ID  Vocational status: n/a    Mental status: Alert  Behavior status: Cooperative but has recently been refusing food at feeding time  Patient reported symptoms of:  Pt is non verbal but caregiver (charge nurse at facility for 17 years) Lisa reported recent weight loss and refusal to eat at meals  Communication modalities: Non-verbal and Other:consistent in answer to yes and no questions  Rehabilitation prognosis: Good rehab  potential to reach and maintain prior level of function    Assessments    Dysphagia Evaluation:    -Reason for referral: Weight loss and Refusal to eat    -Subjective report of swallowing difficulty:  has been on a puree diet with honey thick liquids for a number of years (caregiver reported maybe 5).  Pt is nonverbal so caregiver (nurse who has worked with her for past 17 years, Lisa, provided report of recent weigh loss. Additionally, Pt has been refusing to eat for some staff in the facility.       -Difficulty swallowing: Solids, Liquids, and Pills    -Current diet (solids): Pureed  -Current diet (liquids): Honey Thick  -Current pill intake method: crushed in puree  -Alternative Feeding Method?: No    -Facial appearance Pt has dx of cerebral palsy and motor functions impacted by spasticity and dystonia    -Mandible function Adequate ROM   -Dentition Edentulous   -Labial function Decreased coordination and Unable to assess secondary to poor direction following   -Lingual function WFL for current diet   -Velar function Unable to assess secondary to poor direction following   -Oral apraxia? Absent   -Vocal quality N/a   -Volitional cough Unable to assess secondary to poor direction following   -Respiration WFL   -Drooling? No   -Tremor/involuntary movement? Not present     LIQUID CONSISTENCY TESTING:   Item(s) tested: (Honey-thick) - pt caregiver brought lunch with pre-thickened drink. Note: she did need to add water to reduce it to honey consistency when opened.     Administered by: Spoon and fed by caregiver    Clinical findings:  -Oral phase impairments: reduced labial seal, anterior leakage, and other: pt closed lips tightly to refuse bolus, making clear that she did not wish to eat   -Pharyngeal phase impairments: N/A, patient WFL    Other notes: Other: Pt demonstrated no physiological difficulties with the honey thick bolus when accepted. However, especially towards the beginning of the session pt closed  lips and refused bolus, turning her head away and raising her hands near her face. This behavior is clearly indicative of a refusal of the bolus.     Strategies, attempts, and responses: other: fed by caregiver      SOLID CONSISTENCY TESTING:  Item(s) tested: (Puree) - soup in pure form     Administered by:  caregiver, charge nurse from facility    Clinical findings:  -Oral phase impairments: other: as with liquid bolus - some refusal  -Pharyngeal phase impairments: N/A, patient WFL    Other notes: Other: Pt demonstrated no physiological difficulties with the puree bolus when accepted. However, especially towards the beginning of the session pt closed lips and refused bolus, turning her head away and raising her hands near her face. This behavior is clearly indicative of a refusal of the bolus .    Strategies, attempts, and responses: other: fed by caregiver      -Factors affecting performance: Mental Status and Compliance    -Safety concerns: Risk for inadequate nutrition/ hydration    -Risk factors: Dependent for feeding      SWALLOWING SAFETY PRECAUTIONS:  -Recommended solids: Puree    -Recommended liquids: Honey-thick    -Recommended medication form: crushed in puree    -Recommend Frequent/thorough oral care     -Positioning: Upright position during meals    -Supervision: Feed by Caregiver    -Referrals: Other Nutrition: bolus refusal may be impacting intake and thus nutrition status, Primary care       Goals  No SLP treatment goals are recommended at this time.      Impressions/Recommendations    Impressions:   -Patient presents with chronic dysphagia as related to cerebral palsy diagnosis. Change is swallow status seems to be behavioral rather than physiological. Assessment revealed that physiological swallow status has not changed and pt demonstrated no difficulty with oral or pharyngeal phase of swallow of current diet. Difficulty related to refusal of bolus during feeding. Per reports of caregiver the pt  has been doing this frequently, maybe even more frequently with less familiar caregivers. Refusal of food may be as a result of either 1) a growing dislike of the activity of being fed, or 2) a lack of interest in eating.. Refusal poses a concern for maintenance of nutritional status and a healthy weight so referrals are recommended to ensure this is monitored.     Recommendations:  -Patient would not benefit from outpatient skilled Speech Therapy services at this time.

## 2024-06-11 ENCOUNTER — OFFICE VISIT (OUTPATIENT)
Dept: GASTROENTEROLOGY | Facility: CLINIC | Age: 64
End: 2024-06-11
Payer: MEDICARE

## 2024-06-11 VITALS
SYSTOLIC BLOOD PRESSURE: 112 MMHG | HEIGHT: 58 IN | BODY MASS INDEX: 19.02 KG/M2 | HEART RATE: 72 BPM | DIASTOLIC BLOOD PRESSURE: 62 MMHG

## 2024-06-11 DIAGNOSIS — R63.39 FEEDING DIFFICULTY IN ADULT: Primary | ICD-10-CM

## 2024-06-11 DIAGNOSIS — D69.6 PLATELETS DECREASED (HCC): ICD-10-CM

## 2024-06-11 PROCEDURE — 99215 OFFICE O/P EST HI 40 MIN: CPT | Performed by: INTERNAL MEDICINE

## 2024-06-11 PROCEDURE — G2211 COMPLEX E/M VISIT ADD ON: HCPCS | Performed by: INTERNAL MEDICINE

## 2024-06-12 NOTE — PATIENT INSTRUCTIONS
Scheduled date of EGD with Peg Tube Placement (as of today): 08/13/2024  Physician performing EGD: Dr. Calero   Location of EGD: [facility]  Instructions reviewed with patient by: Carri discussed with Lisa MCMILLAN   Clearances:  N/A

## 2024-06-13 NOTE — PROGRESS NOTES
[facility] - Outpatient Follow-up Note  Terrie Alicea 64 y.o. female MRN: [id number]  Encounter: [id number]          ASSESSMENT AND PLAN:  64 year old female here for followup.  She was accompanied by two nurses at her home.  She has no family and her POA is a .  Their nurses are concerned as she has recent had an illness and lost some weight.  It is reasonable for us to wait a month's time as sometimes with an acute illness this can exacerbate weight loss but as long as it picks back up then would not proceed with peg.    Given staffing issues at their facilities- they would like to do a peg only if weight loss continues and not before August 1st.   She is diabetic but is not on any blood thinners.      1. Feeding difficulty in adult  - EGD Peg Tube Placement; Future    Follow up in a few months time        ______________________________________________________________________    SUBJECTIVE:  64 year old female here for follow up.  Was seen by my PA a few weeks ago due to weight loss after an acute illness.    She has lost approx 5 lbs.        REVIEW OF SYSTEMS IS OTHERWISE NEGATIVE.      Historical Information   Past Medical History:   Diagnosis Date   • Acute metabolic encephalopathy 12/11/2015   • Adjustment disorder    • Altered mental status 12/11/2015   • Anemia    • Bipolar 1 disorder (HCC)    • Cerebral palsy (HCC)    • Chronic hypernatremia 02/06/2016   • Closed fracture of left hip (HCC) 01/19/2016   • Closed left hip fracture (HCC)     no surgery   • Constipation    • Dehydration 02/20/2016   • Developmental delay    • Diabetes mellitus (HCC)    • Difficulty walking    • Disease of thyroid gland    • Diverticulosis    • Dysphagia     Worsening   • Fracture of multiple ribs of right side    • Herpes zoster without complication 06/02/2021   • Hip fracture (HCC) 07/26/2015    left   • Hyperlipidemia    • Hypernatremia 12/28/2018   • Hypotension    • Impulse control  disorder    • Incontinence    • Intellectual disability due to developmental disorder, unspecified    • Microalbuminuria    • 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)    • Osteopathia    • Osteoporosis    • Peripheral neuropathy    • 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HCC)    • Thrombocytopenia (HCC) 07/31/2015     Past Surgical History:   Procedure Laterality Date   • ABDOMINAL SURGERY     • COLECTOMY MIN      re-anastomosis 7/22/16   • COLON SURGERY  1/31/16   • COLONOSCOPY N/A 07/21/2016    Procedure: COLONOSCOPY;  Surgeon: Rosalino Jacome MD;  Location: [facility];  Service:    • COLONOSCOPY N/A 01/31/2016    Procedure: COLONOSCOPY;  Surgeon: Rosalino Jacome MD;  [facility];  Service:    • COLONOSCOPY N/A 07/25/2017    Procedure: COLONOSCOPY;  Surgeon: Rosalino Jacome MD;  Location: [facility];  Service: Colorectal   • COLOSTOMY     • EXPLORATORY LAPAROTOMY W/ BOWEL RESECTION N/A 01/31/2016    Procedure: exploratory laparotomy, left sigmoidectomy, coloproctostomy, take down splenic flexure, loop colostomy;  Surgeon: Rosalino Jacome MD;  [facility];  Service:    • ID CLOSURE ENTEROSTOMY LG/SMALL INTESTINE N/A 07/22/2016    Procedure: SEGMENTAL COLECTOMY WITH COLOCOLOSTOMY;  Surgeon: Rosalino Jacome MD;  [facility]: [facility];  Service: Colorectal   • UPPER GASTROINTESTINAL ENDOSCOPY       Social History   Social History     Substance and Sexual Activity   Alcohol Use Not Currently     Social History     Substance and Sexual Activity   Drug Use No     Social History     Tobacco Use   Smoking Status Never   Smokeless Tobacco Never     Family History   Problem Relation Age of Onset   • Alcohol abuse Mother    • Alcohol abuse Father    • Diabetes Sister    • No Known Problems Sister    • No Known Problems Sister        Meds/Allergies       Current Outpatient Medications:   •  ARIPiprazole (ABILIFY) 2 mg tablet  •  baclofen 10 mg tablet  •  benzonatate (TESSALON PERLES) 100 mg capsule  •  calcium carbonate  "(OS-MORRIS) 600 MG tablet  •  carbamide peroxide (DEBROX) 6.5 % otic solution  •  carbidopa-levodopa (SINEMET CR)  mg TBCR per ER tablet  •  cholecalciferol (VITAMIN D3) 1,000 units tablet  •  citalopram (CeleXA) 40 mg tablet  •  DIABETIC TUSSIN  MG/5ML oral liquid  •  divalproex sodium (DEPAKOTE SPRINKLE) 125 MG capsule  •  docosanol (ABREVA) 10 %  •  ferrous sulfate 324 (65 Fe) mg  •  fexofenadine (ALLEGRA) 30 MG/5ML suspension  •  fluticasone (FLONASE) 50 mcg/act nasal spray  •  glimepiride (AMARYL) 1 mg tablet  •  hydrocortisone 1 % cream  •  levothyroxine 25 mcg tablet  •  LORazepam (ATIVAN) 0.5 mg tablet  •  metFORMIN (GLUCOPHAGE) 1000 MG tablet  •  neomycin-bacitracin-polymyxin b (NEOSPORIN) ointment  •  olopatadine HCl (PATADAY) 0.2 % opth drops  •  omeprazole (PriLOSEC) 20 mg delayed release capsule  •  oxybutynin (DITROPAN) 5 mg tablet  •  polyethylene glycol (GLYCOLAX) 17 GM/SCOOP powder  •  Refresh Liquigel 1 % GEL  •  simvastatin (ZOCOR) 20 mg tablet  •  Starch, Thickening, POWD  •  traZODone (DESYREL) 100 mg tablet  •  Vitamins A & D (VITAMIN A & D) ointment  •  denosumab (Prolia) 60 mg/mL    Allergies   Allergen Reactions   • Keflex [Cephalexin] GI Intolerance           Objective     Blood pressure 112/62, pulse 72, height 4' 10\" (1.473 m), last menstrual period 11/29/2009, not currently breastfeeding. Body mass index is 19.02 kg/m².      PHYSICAL EXAM:      General Appearance:   In a wheelchair, non verbal   HEENT:      Neck:     Lungs:      Heart::      Abdomen:      Genitalia:      Rectal:      Extremities:     Pulses:     Skin:     Lymph nodes:       Lab Results:   No visits with results within 1 Day(s) from this visit.   Latest known visit with results is:   No results displayed because visit has over 200 results.            Radiology Results:   No results found.   Answers submitted by the patient for this visit:  Abdominal Pain Questionnaire (Submitted on 6/4/2024)  Chief Complaint: " Abdominal pain  anorexia: Yes  arthralgias: No  belching: No  constipation: Yes  diarrhea: No  dysuria: No  fever: No  flatus: No  frequency: No  headaches: No  hematochezia: No  hematuria: No  melena: No  myalgias: No  nausea: No  weight loss: Yes  vomiting: No  Diagnostic workup: CT scan

## 2024-06-18 ENCOUNTER — TELEPHONE (OUTPATIENT)
Dept: FAMILY MEDICINE CLINIC | Facility: CLINIC | Age: 64
End: 2024-06-18

## 2024-06-18 ENCOUNTER — TELEPHONE (OUTPATIENT)
Dept: PSYCHIATRY | Facility: CLINIC | Age: 64
End: 2024-06-18

## 2024-06-18 DIAGNOSIS — F41.9 ANXIETY: ICD-10-CM

## 2024-06-18 DIAGNOSIS — F31.9 BIPOLAR AFFECTIVE DISORDER, REMISSION STATUS UNSPECIFIED (HCC): Primary | ICD-10-CM

## 2024-06-18 DIAGNOSIS — G47.00 INSOMNIA, UNSPECIFIED TYPE: ICD-10-CM

## 2024-06-18 NOTE — TELEPHONE ENCOUNTER
Lisa called and asked a referral [facility].    She is in a facility right know and they want her to be within [facility]    Any questions please call Lisa

## 2024-06-18 NOTE — TELEPHONE ENCOUNTER
Patient has been added to the Medication Management wait list without a referral.    Insurance: [employer]/ [place]  Insurance Type:    Commercial []   Medicaid [x]   [facility] (if applicable)   Medicare [x]  Location Preference: [place]  Provider Preference: none  Virtual: Yes [] No [x]  Were outside resources sent: Yes [] No [x]  Advised the care giver to obtain a referral from the pt's PCP.     Presenting Problem  Impulsive   Bipolar   Anxiety   Insomnia

## 2024-06-19 ENCOUNTER — TELEPHONE (OUTPATIENT)
Dept: NEUROLOGY | Facility: CLINIC | Age: 64
End: 2024-06-19

## 2024-06-19 PROBLEM — N39.0 RECURRENT UTI: Status: RESOLVED | Noted: 2017-03-27 | Resolved: 2024-06-19

## 2024-06-19 NOTE — TELEPHONE ENCOUNTER
Spoke to Lisa to confirm pt's appt w/ Annabelle Du in [place] on 6/28/24 at 8:30. Advised pt to arrive 15 minutes prior to check in with the .

## 2024-06-26 ENCOUNTER — APPOINTMENT (OUTPATIENT)
Dept: LAB | Facility: CLINIC | Age: 64
End: 2024-06-26
Payer: MEDICARE

## 2024-06-26 DIAGNOSIS — F31.9 BIPOLAR AFFECTIVE DISORDER, REMISSION STATUS UNSPECIFIED (HCC): ICD-10-CM

## 2024-06-26 DIAGNOSIS — M81.0 OSTEOPOROSIS, UNSPECIFIED OSTEOPOROSIS TYPE, UNSPECIFIED PATHOLOGICAL FRACTURE PRESENCE: ICD-10-CM

## 2024-06-26 LAB
ALBUMIN SERPL BCG-MCNC: 3.6 G/DL (ref 3.5–5)
ALP SERPL-CCNC: 36 U/L (ref 34–104)
ALT SERPL W P-5'-P-CCNC: 8 U/L (ref 7–52)
ANION GAP SERPL CALCULATED.3IONS-SCNC: 11 MMOL/L (ref 4–13)
AST SERPL W P-5'-P-CCNC: 11 U/L (ref 13–39)
BILIRUB DIRECT SERPL-MCNC: 0.01 MG/DL (ref 0–0.2)
BILIRUB SERPL-MCNC: 0.22 MG/DL (ref 0.2–1)
BUN SERPL-MCNC: 18 MG/DL (ref 5–25)
CALCIUM SERPL-MCNC: 10.1 MG/DL (ref 8.4–10.2)
CHLORIDE SERPL-SCNC: 99 MMOL/L (ref 96–108)
CK SERPL-CCNC: 28 U/L (ref 26–192)
CO2 SERPL-SCNC: 34 MMOL/L (ref 21–32)
CREAT SERPL-MCNC: 0.83 MG/DL (ref 0.6–1.3)
GFR SERPL CREATININE-BSD FRML MDRD: 75 ML/MIN/1.73SQ M
GLUCOSE P FAST SERPL-MCNC: 115 MG/DL (ref 65–99)
POTASSIUM SERPL-SCNC: 4.3 MMOL/L (ref 3.5–5.3)
PROT SERPL-MCNC: 6.9 G/DL (ref 6.4–8.4)
SODIUM SERPL-SCNC: 144 MMOL/L (ref 135–147)

## 2024-06-26 PROCEDURE — 82248 BILIRUBIN DIRECT: CPT

## 2024-06-26 PROCEDURE — 36415 COLL VENOUS BLD VENIPUNCTURE: CPT

## 2024-06-26 PROCEDURE — 82550 ASSAY OF CK (CPK): CPT

## 2024-06-26 PROCEDURE — 80053 COMPREHEN METABOLIC PANEL: CPT

## 2024-06-28 ENCOUNTER — OFFICE VISIT (OUTPATIENT)
Dept: NEUROLOGY | Facility: CLINIC | Age: 64
End: 2024-06-28
Payer: MEDICARE

## 2024-06-28 ENCOUNTER — TELEPHONE (OUTPATIENT)
Dept: FAMILY MEDICINE CLINIC | Facility: CLINIC | Age: 64
End: 2024-06-28

## 2024-06-28 VITALS
HEART RATE: 80 BPM | TEMPERATURE: 96.5 F | SYSTOLIC BLOOD PRESSURE: 84 MMHG | DIASTOLIC BLOOD PRESSURE: 52 MMHG | HEIGHT: 58 IN | BODY MASS INDEX: 19.02 KG/M2

## 2024-06-28 DIAGNOSIS — G20.C PARKINSONISM, UNSPECIFIED PARKINSONISM TYPE: Primary | ICD-10-CM

## 2024-06-28 DIAGNOSIS — I95.9 HYPOTENSION: ICD-10-CM

## 2024-06-28 DIAGNOSIS — G24.01 TARDIVE DYSKINESIA: ICD-10-CM

## 2024-06-28 DIAGNOSIS — G80.3 DYSTONIC CEREBRAL PALSY (HCC): ICD-10-CM

## 2024-06-28 PROCEDURE — 99214 OFFICE O/P EST MOD 30 MIN: CPT | Performed by: NURSE PRACTITIONER

## 2024-06-28 NOTE — PATIENT INSTRUCTIONS
Continue current meds, ok to crush sinemet CR as this is working well.   If mouth movements worsen options are austedo, amantadine, reducing sinemet however currently not interested in changes today due to prior reaction with ingrezza.     Please monitor blood pressure over the next few days, if remains low <80/50 or any other symptoms (lethargy, fever) go to ER

## 2024-06-28 NOTE — PROGRESS NOTES
"Patient ID: Terrie Alicea is a 64 y.o. female.    Assessment/Plan:    Dystonic cerebral palsy (HCC)  Patient continues to follow with PM&R for potential Botox injections.  Staff is felt with her new wheelchair along with wrist and hand braces her condition is manageable and therefore has held off on these.  She will continue to follow-up with PM&R as scheduled.    Parkinsonism (HCC)  Stable on medication, she has had improvement of tremor with reduction of abilify and initiation of sinemet.  She has had movements of the mouth consistent with dyskinesia for 10 to 15 years, these have somewhat increased since starting Sinemet.  Psych did trial Ingrezza on the patient to treat symptoms however she had side effects therefore was stopped.  We did discuss considering reduction in send Sinemet however staff feels patient is not currently bothered by the symptoms.  Due to this she will continue her current dose of Sinemet CR 25/100 mg 2 tabs twice daily.      Hypotension  Patient hypotensive in the office today, BP 84/52, she is asymptomatic and alert.  In review of home vital signs her blood pressure does run between  systolically.  Staff will continue to monitor patient along with her vital signs at home.  If she becomes symptomatic or remains hypotensive recommend to go to the ER.        Diagnoses and all orders for this visit:    Parkinsonism, unspecified Parkinsonism type    Dystonic cerebral palsy (HCC)    Tardive dyskinesia    Hypotension           Subjective:    HPI  Terrie Alicea is a 63 y.o. female with a PMH of intellectual disability 2/2 cerebral palsy, diabetes, and dysphagia presenting for follow up.        To review, she has followed with our epilepsy team since 2016 for history of seizure like episodes.  When she was seen in 10/2021  her largest concern was increased tremors, and gait changes, possible \"freezing\"Her exam did show significant spasticity. Symptoms worsening over the past few months. " She was started on a trial of sinemet due to worsening parkinsonain features, she was referred to PM&R due to spasticity.  Did see PM&R for inital consult 4/2022 in which there was a question of dystonia vs spasticity, plan was for possible botox injections.      Last office visit 2/2024 in which no changes were made, movements of the mouth consistent with tardive dyskinesia were noted, were to monitor .    Interval History:  has held off on botox with PM&R, staff continues to do stretching with her.  she was hospitalized for sepsis in May.  Has had decreased appetite and weight loss, may be pursuing PEG tube placement. This is mainly due to the fact she is closing her mouth and not opening her mouth for staff to feed her.      Psych did place her on ingrezza for mouth movements, had a lot of behavior side effects so stopped this medication. They have found she is more cooperative since coming off medication.   Mouth movements have been present for 10-15 years, sinemet has somewhat increased these.       Her BP tends to run on the lower sides 80-100s/60s typically. Patient is not symptomatic.      She remains on sinemet 25/100 mg CR 2 tabs BID. They are crushing this medication however they do feel this lasts long enough as she cannot get meds at day program.         MRI cspine 8/2023: Multilevel cervical degenerative disc disease as detailed with diffuse disc osteophyte complexes at the C4-5 through the C6-7 levels. Superimposed central disc protrusion at the C4-5 level causing moderate central canal narrowing and focal cord   impingement. There is no cord signal abnormality to suggest myelomalacia or edema. Moderate to severe right C4-5, right C5-6, and left C6-7 neural foraminal narrowing.     MRI brain 5/2023: Study severely degraded by patient motion artifact with limited pulse sequences obtained. No mass effect or midline shift and infarct identified. Diffuse cerebral volume loss.       The following portions  "of the patient's history were reviewed and updated as appropriate: allergies, current medications, past family history, past medical history, past social history, past surgical history and problem list.       Objective:    Blood pressure (!) 84/52, pulse 80, temperature (!) 96.5 °F (35.8 °C), temperature source Temporal, height 4' 10\" (1.473 m), last menstrual period 11/29/2009, not currently breastfeeding.    Physical Exam  Constitutional:       General: She is awake.   Eyes:      General: Lids are normal.      Extraocular Movements: Extraocular movements intact.      Pupils: Pupils are equal, round, and reactive to light.   Neurological:      Mental Status: She is alert.      Deep Tendon Reflexes: Reflexes are normal and symmetric.         Neurological Exam  Mental Status  Awake and alert. Speech: nonverbal. Follows one-step commands.    Cranial Nerves  CN III, IV, VI: Extraocular movements intact bilaterally. Normal lids and orbits bilaterally. Pupils equal round and reactive to light bilaterally.  CN V: Facial sensation is normal.  Right: Reacts symmetrically to light touch.  Left: Reacts symmetrically to light touch.  CN VII: Full and symmetric facial movement.  CN VIII: Hearing is normal.  CN XI: Shoulder shrug strength is normal.  CN XII: Tongue midline without atrophy or fasciculations.    Motor    Moves all extremities antigravity normal  strength b/l, intermittently follows commands so had difficulty assessing full strength but appears equal.    Sensory  Light touch is normal in upper and lower extremities.     Reflexes  Deep tendon reflexes are 2+ and symmetric in all four extremities.    Coordination    Very minimal resting tremor in left hand, no postural tremor,  Unable to perform CARLYLE but no significant bradykinesia. She did have spasticity in the UE along with toritcollis.     Flexion of b/l wrists, was able to overcome with passive ROM.      Involuntary movement of the feet if unstrapped from " wheelchair was improved with wheel chair positioning.   .    Gait    Patient presented in wheel chair, not ambulated due to safety concerns. .      I have personally reviewed the ROS performed by the MA.    ROS:    Review of Systems   Constitutional:  Negative for appetite change, fatigue and fever.   HENT: Negative.  Negative for hearing loss, tinnitus, trouble swallowing and voice change.    Eyes: Negative.  Negative for photophobia, pain and visual disturbance.   Respiratory: Negative.  Negative for shortness of breath.    Cardiovascular: Negative.  Negative for palpitations.   Gastrointestinal: Negative.  Negative for nausea and vomiting.   Endocrine: Negative.  Negative for cold intolerance.   Genitourinary: Negative.  Negative for dysuria, frequency and urgency.   Musculoskeletal:  Negative for back pain, gait problem, myalgias, neck pain and neck stiffness.   Skin: Negative.  Negative for rash.   Allergic/Immunologic: Negative.    Neurological:  Positive for tremors. Negative for dizziness, seizures, syncope, facial asymmetry, speech difficulty, weakness, light-headedness, numbness and headaches.   Hematological: Negative.  Does not bruise/bleed easily.   Psychiatric/Behavioral: Negative.  Negative for confusion, hallucinations and sleep disturbance.    All other systems reviewed and are negative.

## 2024-06-28 NOTE — TELEPHONE ENCOUNTER
Signature needed: Dr. Fernando    Form: [facility] Patient Care    Fax# [phone]    Folder: [place]

## 2024-07-02 ENCOUNTER — CLINICAL SUPPORT (OUTPATIENT)
Dept: FAMILY MEDICINE CLINIC | Facility: CLINIC | Age: 64
End: 2024-07-02

## 2024-07-02 DIAGNOSIS — M81.0 OSTEOPOROSIS, UNSPECIFIED OSTEOPOROSIS TYPE, UNSPECIFIED PATHOLOGICAL FRACTURE PRESENCE: Primary | ICD-10-CM

## 2024-07-02 PROCEDURE — 96372 THER/PROPH/DIAG INJ SC/IM: CPT

## 2024-07-02 NOTE — ASSESSMENT & PLAN NOTE
Patient hypotensive in the office today, BP 84/52, she is asymptomatic and alert.  In review of home vital signs her blood pressure does run between  systolically.  Staff will continue to monitor patient along with her vital signs at home.  If she becomes symptomatic or remains hypotensive recommend to go to the ER.

## 2024-07-02 NOTE — ASSESSMENT & PLAN NOTE
Stable on medication, she has had improvement of tremor with reduction of abilify and initiation of sinemet.  She has had movements of the mouth consistent with dyskinesia for 10 to 15 years, these have somewhat increased since starting Sinemet.  Psych did trial Ingrezza on the patient to treat symptoms however she had side effects therefore was stopped.  We did discuss considering reduction in send Sinemet however staff feels patient is not currently bothered by the symptoms.  Due to this she will continue her current dose of Sinemet CR 25/100 mg 2 tabs twice daily.

## 2024-07-02 NOTE — ASSESSMENT & PLAN NOTE
Patient continues to follow with PM&R for potential Botox injections.  Staff is felt with her new wheelchair along with wrist and hand braces her condition is manageable and therefore has held off on these.  She will continue to follow-up with PM&R as scheduled.

## 2024-07-08 ENCOUNTER — TELEPHONE (OUTPATIENT)
Dept: FAMILY MEDICINE CLINIC | Facility: CLINIC | Age: 64
End: 2024-07-08

## 2024-07-15 ENCOUNTER — TELEPHONE (OUTPATIENT)
Dept: NEUROLOGY | Facility: CLINIC | Age: 64
End: 2024-07-15

## 2024-07-15 NOTE — TELEPHONE ENCOUNTER
Called patient to confirm appt on 7/23/24 @ 1pm with Dr Depadua.  [facility] confirmed the appointment and that patient will arrive 15 mins early.

## 2024-07-19 ENCOUNTER — APPOINTMENT (OUTPATIENT)
Dept: LAB | Facility: AMBULARY SURGERY CENTER | Age: 64
End: 2024-07-19
Payer: MEDICARE

## 2024-07-19 ENCOUNTER — CONSULT (OUTPATIENT)
Dept: UROLOGY | Facility: CLINIC | Age: 64
End: 2024-07-19
Payer: MEDICARE

## 2024-07-19 VITALS
OXYGEN SATURATION: 95 % | BODY MASS INDEX: 18.39 KG/M2 | WEIGHT: 87.6 LBS | HEIGHT: 58 IN | HEART RATE: 72 BPM | DIASTOLIC BLOOD PRESSURE: 58 MMHG | SYSTOLIC BLOOD PRESSURE: 94 MMHG

## 2024-07-19 DIAGNOSIS — M81.0 OSTEOPOROSIS, UNSPECIFIED OSTEOPOROSIS TYPE, UNSPECIFIED PATHOLOGICAL FRACTURE PRESENCE: ICD-10-CM

## 2024-07-19 DIAGNOSIS — E46 PROTEIN-CALORIE MALNUTRITION, UNSPECIFIED SEVERITY (HCC): Primary | ICD-10-CM

## 2024-07-19 DIAGNOSIS — N39.0 RECURRENT UTI: ICD-10-CM

## 2024-07-19 LAB
ALBUMIN SERPL BCG-MCNC: 3.8 G/DL (ref 3.5–5)
ALP SERPL-CCNC: 40 U/L (ref 34–104)
ALT SERPL W P-5'-P-CCNC: 6 U/L (ref 7–52)
ANION GAP SERPL CALCULATED.3IONS-SCNC: 13 MMOL/L (ref 4–13)
AST SERPL W P-5'-P-CCNC: 12 U/L (ref 13–39)
BILIRUB SERPL-MCNC: 0.19 MG/DL (ref 0.2–1)
BUN SERPL-MCNC: 13 MG/DL (ref 5–25)
CALCIUM SERPL-MCNC: 9.9 MG/DL (ref 8.4–10.2)
CHLORIDE SERPL-SCNC: 97 MMOL/L (ref 96–108)
CO2 SERPL-SCNC: 31 MMOL/L (ref 21–32)
CREAT SERPL-MCNC: 0.86 MG/DL (ref 0.6–1.3)
GFR SERPL CREATININE-BSD FRML MDRD: 72 ML/MIN/1.73SQ M
GLUCOSE SERPL-MCNC: 89 MG/DL (ref 65–140)
POTASSIUM SERPL-SCNC: 5.6 MMOL/L (ref 3.5–5.3)
PROT SERPL-MCNC: 6.7 G/DL (ref 6.4–8.4)
SODIUM SERPL-SCNC: 141 MMOL/L (ref 135–147)

## 2024-07-19 PROCEDURE — 80053 COMPREHEN METABOLIC PANEL: CPT

## 2024-07-19 PROCEDURE — 99203 OFFICE O/P NEW LOW 30 MIN: CPT

## 2024-07-19 PROCEDURE — 36415 COLL VENOUS BLD VENIPUNCTURE: CPT

## 2024-07-19 RX ORDER — MULTIVIT WITH MINERALS/LUTEIN
TABLET ORAL
Qty: 30 TABLET | Refills: 3 | Status: SHIPPED | OUTPATIENT
Start: 2024-07-19

## 2024-07-19 RX ORDER — METHENAMINE HIPPURATE 1000 MG/1
TABLET ORAL
Qty: 60 TABLET | Refills: 3 | Status: SHIPPED | OUTPATIENT
Start: 2024-07-19

## 2024-07-19 NOTE — PROGRESS NOTES
7/19/2024      No chief complaint on file.        Assessment and Plan    64 y.o. female managed by NEW PATIENT    Recurrent UTIs  Urinary Incontience  -Recommended cranberry supplements, women's health probiotics, 2 grams daily d-mannose.  -Start hiprex with vitamin C for UTI prevention. Ok to take pills crushed.   -Patient drinks anywhere btwn  fluid ounces a day. She is on pureed diet and honey thick liquids.  -Patient will return in 3 months for follow-up.       History of Present Illness  Terrie Alicea is a 64 y.o. female here for evaluation of recurrent UTIs. Previously a patient of Dr. Fontenot in 2016. She has a past medical history of cerebral palsy, severe intellectual disability and developmental delay, spastic quadriparesis, parkinsonism, type 2 DM, hypothyroidism, iron deficiency anemia, osteoporosis, dysphagia, sigmoid volvulus, bipolar disorder, anxiety, and urinary incontinence.     She presents with her caregiver, who has taken care of her for the last 17 years. She resides in a group home.     She was hospitalized in May 2024 with altered mental status and sepsis d/t gram-negative UTI, and SCAR. Per patient's caregiver, she has had urine cultures positive for klebsiella and E.coli. She has been on daily 50 mg macrobid since 2016 for uti prevention. Per patient's caregiver, infectious disease was consulted and recommended that she stop taking macrobid. Caregiver states her PCP also agrees. Her caregiver states that she is less verbal now because of CP, and when she gets a UTI she meets sepsis protocol and needs hospitalization. She is fully incontinent now and not walking.     CT (5/12/24) showing unremarkable bladder. No kidney stones. No hydronephrosis.       Over the last year, this recent hospitalization in May 2024 was her second UTI of the year. Her previous urine culture was positive for E.coli in August of 2023.    Her caregiver states that she is on a pureed diet with honey thick  "liquids. She also states that she lost 8 lbs while in the hospital because they were not feeding her and she is unable to feed herself. The caregiver believes that she will likely need a G tube. Caregiver states that she sees GI next month.     Patient has been incontinent of urine for many years, occasional incontinence of BM. Caregiver states that she will sometimes sit on the toilet to void.          Review of Systems   Constitutional:         Patient is non-verbal                Vitals  Vitals:    07/19/24 1454   BP: 94/58   BP Location: Left arm   Patient Position: Sitting   Cuff Size: Standard   Pulse: 72   SpO2: 95%   Weight: 39.7 kg (87 lb 9.6 oz)   Height: 4' 10\" (1.473 m)       Physical Exam  Constitutional:       Comments: In wheelchair   HENT:      Head: Normocephalic.   Eyes:      Extraocular Movements: Extraocular movements intact.      Pupils: Pupils are equal, round, and reactive to light.   Pulmonary:      Effort: Pulmonary effort is normal. No respiratory distress.   Musculoskeletal:         General: Normal range of motion.      Cervical back: Normal range of motion.   Neurological:      Mental Status: She is alert.      Gait: Gait abnormal.      Comments: wheelchair bound           Past History  Past Medical History:   Diagnosis Date    Acute metabolic encephalopathy 12/11/2015    Adjustment disorder     Altered mental status 12/11/2015    Anemia     Bipolar 1 disorder (HCC)     Cerebral palsy (HCC)     Chronic hypernatremia 02/06/2016    Closed fracture of left hip (HCC) 01/19/2016    Closed left hip fracture (HCC)     no surgery    Constipation     Dehydration 02/20/2016    Developmental delay     Diabetes mellitus (HCC)     Difficulty walking     Disease of thyroid gland     Diverticulosis     Dysphagia     Worsening    Fracture of multiple ribs of right side     Herpes zoster without complication 06/02/2021    Hip fracture (HCC) 07/26/2015    left    Hyperlipidemia     Hypernatremia 12/28/2018 "    Hypotension     Impulse control disorder     Incontinence     Intellectual disability due to developmental disorder, unspecified     Microalbuminuria     Neuropathy in diabetes ([facility])     Osteopathia     Osteoporosis     Peripheral neuropathy     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[facility])     Thrombocytopenia ([facility]) 07/31/2015     Social History     Socioeconomic History    Marital status: Single     Spouse name: None    Number of children: None    Years of education: None    Highest education level: None   Occupational History    None   Tobacco Use    Smoking status: Never    Smokeless tobacco: Never   Vaping Use    Vaping status: Never Used   Substance and Sexual Activity    Alcohol use: Not Currently    Drug use: No    Sexual activity: Never   Other Topics Concern    None   Social History Narrative    Lives in a group home      Social Determinants of Health     Financial Resource Strain: Low Risk  (5/15/2024)    Overall Financial Resource Strain (CARDIA)     Difficulty of Paying Living Expenses: Not hard at all   Food Insecurity: No Food Insecurity (5/15/2024)    Hunger Vital Sign     Worried About Running Out of Food in the Last Year: Never true     Ran Out of Food in the Last Year: Never true   Transportation Needs: No Transportation Needs (5/15/2024)    PRAPARE - Transportation     Lack of Transportation (Medical): No     Lack of Transportation (Non-Medical): No   Physical Activity: Inactive (11/2/2023)    Exercise Vital Sign     Days of Exercise per Week: 0 days     Minutes of Exercise per Session: 0 min   Stress: No Stress Concern Present (5/20/2024)    [employer] - Occupational Stress Questionnaire     Feeling of Stress : Not at all   Social Connections: Socially Isolated (11/2/2023)    Social Connection and Isolation Panel [NHANES]     Frequency of Communication with Friends and Family: Never     Frequency of Social Gatherings with Friends and Family: Never     Attends [employer] Services: Never  "    Active Member of Clubs or Organizations: No     Attends Club or Organization Meetings: Never     Marital Status: Never    Intimate Partner Violence: Not At Risk (5/20/2024)    Humiliation, Afraid, Rape, and Kick questionnaire     Fear of Current or Ex-Partner: No     Emotionally Abused: No     Physically Abused: No     Sexually Abused: No   Housing Stability: Low Risk  (5/15/2024)    Housing Stability Vital Sign     Unable to Pay for Housing in the Last Year: No     Number of Times Moved in the Last Year: 1     Homeless in the Last Year: No     Social History     Tobacco Use   Smoking Status Never   Smokeless Tobacco Never     Family History   Problem Relation Age of Onset    Alcohol abuse Mother     Alcohol abuse Father     Diabetes Sister     No Known Problems Sister     No Known Problems Sister        The following portions of the patient's history were reviewed and updated as appropriate: allergies, current medications, past medical history, past social history, past surgical history and problem list.    Results  No results found for this or any previous visit (from the past 1 hour(s)).]  No results found for: \"PSA\"  Lab Results   Component Value Date    GLUCOSE 113 01/31/2016    CALCIUM 10.1 06/26/2024     04/06/2016    K 4.3 06/26/2024    CO2 34 (H) 06/26/2024    CL 99 06/26/2024    BUN 18 06/26/2024    CREATININE 0.83 06/26/2024     Lab Results   Component Value Date    WBC 6.57 05/14/2024    HGB 9.6 (L) 05/14/2024    HCT 30.0 (L) 05/14/2024     (H) 05/14/2024     (L) 05/14/2024       LEATHA Carrero  "

## 2024-07-19 NOTE — ASSESSMENT & PLAN NOTE
"Lab Results   Component Value Date    HGBA1C 6.6 (H) 12/20/2023       No results for input(s): \"POCGLU\" in the last 72 hours.    Blood Sugar Average: Last 72 hrs:    -SSI/accuchecks  " Admission Reconciliation is Completed  Discharge Reconciliation is Completed

## 2024-07-23 ENCOUNTER — OFFICE VISIT (OUTPATIENT)
Dept: NEUROLOGY | Facility: CLINIC | Age: 64
End: 2024-07-23
Payer: MEDICARE

## 2024-07-23 VITALS
TEMPERATURE: 97.3 F | OXYGEN SATURATION: 96 % | HEART RATE: 76 BPM | SYSTOLIC BLOOD PRESSURE: 92 MMHG | DIASTOLIC BLOOD PRESSURE: 78 MMHG

## 2024-07-23 DIAGNOSIS — G80.3 DYSTONIC CEREBRAL PALSY (HCC): ICD-10-CM

## 2024-07-23 DIAGNOSIS — R13.10 DYSPHAGIA, UNSPECIFIED TYPE: Primary | ICD-10-CM

## 2024-07-23 DIAGNOSIS — G82.50 SPASTIC QUADRIPARESIS (HCC): ICD-10-CM

## 2024-07-23 PROCEDURE — 99213 OFFICE O/P EST LOW 20 MIN: CPT | Performed by: PHYSICAL MEDICINE & REHABILITATION

## 2024-07-23 NOTE — PROGRESS NOTES
Physical Medicine & Rehabilitation Follow-up Note  Terrie Alicea 64 y.o. female    ASSESSMENT/PLAN:     Diagnoses and all orders for this visit:    Dysphagia, unspecified type    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HCC)    Dystonic cerebral palsy (HCC)        Ms. Alicea is a 60yo F  cerebral palsy with toe walking, bipolar disorder/mood disturbance with impulse control, now with Parkinsonism (possibly secondary in part to psychiatric meds), and some EPS (tardive dyskinesia it looks like). Would classify her as dystonic cerebral palsy vs. mixed pattern CP.     She is here for surveillance of her function and dystonic movements. Her caregivers are happy with her custom wheelchair and feels it meets her needs well. Her appetite has been poor though, and she at times refuses food on her pureed/HTL diet. To ensure she has adequate nutritional intake and hydration, her team is planning on pursuing PEG tube placement. Her POA is her , and they are working out those logistics.     They are fine with her oral dyskinetic movements as they do not feel this is driving her difficulty or bothering the patient, as she has had those for far longer. They are not keen on starting any therapies or botox at this time for her neck position or other dystonias given their concern for risk of distal spread and dysphagia.     Her team would like to continue every 6 month follow-up to address any functional issues that should arise in that period of time. I am happy to accommodate. Can continue Baclofen at its current dose    Will follow-up on 01/21/2025 at 1:00pm.       HPI/SUBJECTIVE:  Last here with long term caregiver, Lisa in January 2024.     Since last seen she has been seen by Neurology who are monitoring for side effects from her Sinemet (oral dyskinesias). She pneumonia in March treated with abx on an outpatient basis. She was admitted to the hospital from 5/10-5/15 for SIRS. There was some concern for UTI - but ultimately felt to  be colonization. Chronic macrobid was discontinued. There was also concern for an aspiration event, but CT was otherwise unremarkable save for some debris in her trachea. Her Baclofen was temporarily held and then resumed (due to lethargy during her acute illness). Similarly her mood medications were resumed on discharge. She followed with GI as an outpatient, who recommended small, frequent meals for patient and modified diet. They are monitoring her appetite and input due to weight loss, in case of need for PEG tube. She continues to struggle to eat. She will close her mouth to feeds at times, and is only more consistent with her caregiver Lisa. Her appetite is poor. Lisa is worried about her nutritional intake and hydration. She understands it's not about prolonging life, and they would still attempt oral feeds, but they are going to proceed with PEG placement to ensure adequate nutrition and hydration.They are working this out with her POA. It's planned for August 13th. She remains at a lower weight (she is 87lbs), and is struggling to gain back the 10lbs or so she lost. She has not lost more weight. No night sweats, fevers, chills. Lisa reports she is up to date on all her screens.     Her abilify was adjusted and her tremors have improved. Psych trialed Ingrezza for dyskinesias (oral) but had side effects so those were stopped - depression, yelling, crying, emotionally labile, and decreased appetite. Symptoms except for appetite have improved. Staff and Neuro felt keeping her on Sinemet at current dosage was better as the oral dyskinesias do not bother patient.     She recently was seen by Urology as well, who recommended cranberry supplements, 2 grams daily of D-mannose, hipprex with vitamin C as well, and ensure adequate hydration. Bowel/bladder function are stable. She just started the hipprex this morning, and the vitamin C was started on Suday. Depending on how that goes, they have add D-mannose  and cranberry supplements.     Imaging: I have personally reviewed imaging with results as follows:  5/12/2024 CTCAP:  1. No obstructive uropathy.  2. Mild stool retention.  3. There is bibasilar dependent atelectasis. Lungs are otherwise clear. There is dependent debris within the trachea in keeping with mucus.     ROS: A 10 point review of systems was negative except for what is noted in the HPI.    Function:   Current Level of Function:   Dependent for care/function.  At times up to 2 person assist  Primary wheelchair mobility, dependent for propulsion. Hasa custom wheelchair with tilt in spaceand foot straps.   Transfers with assist, occasional attempts at stand pivots with assistance.  Engaged, enjoys coloring. Verbalizes minimally with staff.      OBJECTIVE:   BP 92/78 (BP Location: Left arm, Patient Position: Sitting, Cuff Size: Standard)   Pulse 76   Temp (!) 97.3 °F (36.3 °C) (Temporal)   LMP 11/29/2009 (Exact Date)   SpO2 96%     Gen: No acute distress  HEENT: Moist mucus membranes, Normocephalic/Atraumatic  Cardiovascular: Distal pulses palpable  Heme/Extr: No edema  Pulmonary: Non-labored breathing.   : No boateng  GI: Soft, non-tender, non-distended.   MSK:   R hand tight at FDS, L fingers tight at lumbricals.  Able to flex her arms over head and hold them slightly abducted.  Elbows tend toward flexion  Wrists able to get toward neutral  No issues accessing groin, palm,axilla, elbow for hygiene.   Integumentary: Skin is warm, dry.   Neuro: Awake and alert. Follows simple commands. Oral motor dyskinesias stable. Feet tend to plantarflexion, but foot plates are angled to accommodate with foot straps. Adductor bolster present, tendency to internal rotation and adduction.   Psych: Coopeartive      The following portions of the patient's history were reviewed and updated as appropriate: allergies, current medications, past family history, past medical history, past social history, past surgical history  and problem list.      Current Outpatient Medications:     ARIPiprazole (ABILIFY) 2 mg tablet, Take 1 tablet by mouth daily at bedtime, Disp: , Rfl:     Ascorbic Acid (vitamin C) 1000 MG tablet, Crush 1 tablet daily, Disp: 30 tablet, Rfl: 3    baclofen 10 mg tablet, TAKE 1 TABLET BY MOUTH THREE TIMES A DAY (SPASTICITY AS LATE EFFECT OF CVA), Disp: 84 tablet, Rfl: 10    benzonatate (TESSALON PERLES) 100 mg capsule, TAKE 1 CAPSULE BY MOUTH EVERY 8 HOURS AS NEEDED FOR COUGH **DO NOT CRUSH**, Disp: 5 capsule, Rfl: 11    calcium carbonate (OS-MORRIS) 600 MG tablet, Take 1 tablet (600 mg total) by mouth 2 (two) times a day with meals, Disp: 60 tablet, Rfl: 2    carbamide peroxide (DEBROX) 6.5 % otic solution, 2 drops 2 drops in the affected ear prn x 5 days, Disp: , Rfl:     carbidopa-levodopa (SINEMET CR)  mg TBCR per ER tablet, TAKE 2 TABLETS BY MOUTH (50/200MG) TWICE A DAY (PARKINSONS DISEASE), Disp: 112 tablet, Rfl: 10    cholecalciferol (VITAMIN D3) 1,000 units tablet, Take 1 tablet (1,000 Units total) by mouth daily, Disp: 90 tablet, Rfl: 3    citalopram (CeleXA) 40 mg tablet, Take 1 tablet by mouth daily, Disp: , Rfl:     denosumab (Prolia) 60 mg/mL, Inject 1 mL (60 mg total) under the skin once for 1 dose To be administered on 7/2/24, Disp: 1 mL, Rfl: 0    DIABETIC TUSSIN  MG/5ML oral liquid, TAKE 2 TEASPOONSFUL (10ML) BY MOUTH EVERY 4 HOURS AS NEEDED FOR COUGH & CONGESTION, Disp: 118 mL, Rfl: 11    divalproex sodium (DEPAKOTE SPRINKLE) 125 MG capsule, 3 capsules 2 (two) times a day 4 caps BID, Disp: , Rfl:     docosanol (ABREVA) 10 %, Apply topically 5 (five) times a day Apply and rub in 5x a day until healed as needed for lesion, Disp: , Rfl: 0    ferrous sulfate 324 (65 Fe) mg, Take 1 tablet by mouth 2 (two) times a day, Disp: , Rfl:     fexofenadine (ALLEGRA) 30 MG/5ML suspension, Take 30 mL (180 mg total) by mouth daily as needed (seasonal allergies), Disp: 237 mL, Rfl: 3    fluticasone (FLONASE) 50  mcg/act nasal spray, 1 spray into each nostril daily as needed for rhinitis or allergies, Disp: 1 Bottle, Rfl: 1    glimepiride (AMARYL) 1 mg tablet, TAKE 1 TABLET BY MOUTH DAILY WITH BREAKFAST (DX:DM TYPE 2), Disp: 28 tablet, Rfl: 10    hydrocortisone 1 % cream, Apply topically to affected area twice daily as needed for rash, Disp: 30 g, Rfl: 0    levothyroxine 25 mcg tablet, TAKE 1 TABLET BY MOUTH EVERY MORNING (HYPOTHYROIDISM), Disp: 28 tablet, Rfl: 10    LORazepam (ATIVAN) 0.5 mg tablet, Take 0.5 mg by mouth daily Every day at 8pm, Disp: , Rfl:     metFORMIN (GLUCOPHAGE) 1000 MG tablet, TAKE 1 TABLET BY MOUTH TWICE A DAY WITH MEALS (TYPE 2 DIABETES MELLITUS), Disp: 56 tablet, Rfl: 10    methenamine hippurate (HIPREX) 1 g tablet, Crush 1 tablet two times a day with meals, Disp: 60 tablet, Rfl: 3    neomycin-bacitracin-polymyxin b (NEOSPORIN) ointment, Apply 1 application topically 2 (two) times a day as needed Apply to affected area BID PRN, Disp: , Rfl:     olopatadine HCl (PATADAY) 0.2 % opth drops, Administer 1 drop to both eyes as needed Instill 1 drop into affected eyes daily PRN for eye redness, Disp: , Rfl:     omeprazole (PriLOSEC) 20 mg delayed release capsule, TAKE 1 CAPSULE BY MOUTH TWICE A DAY BEFORE MEALS (GERD), Disp: 56 capsule, Rfl: 10    oxybutynin (DITROPAN) 5 mg tablet, TAKE 1 TABLET BY MOUTH THREE TIMES A DAY (URINARY INCONTINENCE), Disp: 84 tablet, Rfl: 10    polyethylene glycol (GLYCOLAX) 17 GM/SCOOP powder, DISSOLVE 17GM (1 CAPFUL) IN 8 OZ FLUIDS AND CONSUME BY MOUTH EVERY MORNING *HOLD 1 DAY FOR LOOSE STOOLS* (CONSTIPATION), Disp: 510 g, Rfl: 10    Refresh Liquigel 1 % GEL, 2 (two) times a day 1 drop Bid bilaterally, Disp: , Rfl:     simvastatin (ZOCOR) 20 mg tablet, TAKE 1 TABLET BY MOUTH EVERY EVENING (HYPERLIPIDEMIA), Disp: 28 tablet, Rfl: 10    Starch, Thickening, POWD, Take by mouth daily Use on food and liquid as directed, Disp: 1020 g, Rfl: 3    traZODone (DESYREL) 100 mg tablet,  Take 1 tablet (100 mg total) by mouth daily at  8 pm for depression, Disp: 90 tablet, Rfl: 0    Vitamins A & D (VITAMIN A & D) ointment, Apply topically as needed for skin irritation, Disp: , Rfl:     Past Surgical History:   Procedure Laterality Date    ABDOMINAL SURGERY      COLECTOMY MIN      re-anastomosis 7/22/16    COLON SURGERY  1/31/16    COLONOSCOPY N/A 07/21/2016    Procedure: COLONOSCOPY;  Surgeon: Rosalino Jacome MD;  Location: [facility];  Service:     COLONOSCOPY N/A 01/31/2016    Procedure: COLONOSCOPY;  Surgeon: Rosalino Jacome MD;  [facility];  Service:     COLONOSCOPY N/A 07/25/2017    Procedure: COLONOSCOPY;  Surgeon: Rosalino Jacome MD;  Location: [facility];  Service: Colorectal    COLOSTOMY      EXPLORATORY LAPAROTOMY W/ BOWEL RESECTION N/A 01/31/2016    Procedure: exploratory laparotomy, left sigmoidectomy, coloproctostomy, take down splenic flexure, loop colostomy;  Surgeon: Roslaino Jacome MD;  Location[facility];  Service:     TX CLOSURE ENTEROSTOMY LG/SMALL INTESTINE N/A 07/22/2016    Procedure: SEGMENTAL COLECTOMY WITH COLOCOLOSTOMY;  Surgeon: Rosalino Jacome MD;  [facility];  Service: Colorectal    UPPER GASTROINTESTINAL ENDOSCOPY         Patient Active Problem List    Diagnosis Date Noted    Protein-calorie malnutrition, unspecified severity (HCC) 07/19/2024    Hospital discharge follow-up 05/20/2024    SIRS (systemic inflammatory response syndrome) ([facility]) 05/13/2024    Developmental delay     Preop examination 08/08/2023    Cataract 06/30/2023    Internal hemorrhoids 06/20/2023    Contracture of right hand 05/12/2023    Need for shingles vaccine 03/31/2023    Viral illness 11/04/2022    Dystonic cerebral palsy (HCC) 04/19/2022    Cervical dystonia 04/19/2022    Parkinsonism 01/21/2022    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[facility]) 10/18/2021    Polyneuropathy associated with underlying disease ([facility]) 10/18/2021    Hypercalcemia 08/19/2021    Herpes zoster without  complication 06/02/2021    Abnormal finding on lung imaging 04/06/2021    Anemia 11/03/2020    Eosinophilic leukocytosis 09/08/2020    Underweight 09/03/2020    Dysphagia 08/01/2020    History of vitamin D deficiency 01/22/2020    History of fall 10/28/2019    Breast asymmetry 07/09/2019    Hypernatremia 12/28/2018    Gait disturbance 06/26/2018    Osteoarthritis of both hips 06/08/2018    Severe Intellectual disability 04/19/2018    Type 2 diabetes mellitus, without long-term current use of insulin ([facility]) 04/19/2018    Abnormal albumin 09/18/2017    Peripheral neuropathy 09/18/2017    Seasonal allergies 06/29/2017    Physical deconditioning 05/09/2017    Hyperlipidemia 03/27/2017    Gastroesophageal reflux disease 03/27/2017    Mixed cerebral palsy ([facility]) 03/27/2017    Spasticity 03/27/2017    Ambulatory dysfunction 03/27/2017    Bipolar disorder ([facility]) 03/27/2017    Hypotension 04/11/2016    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[facility]) 02/01/2016    Weight loss 01/21/2016    Osteoporosis 01/19/2016    Resting tremor 01/19/2016    Gait abnormality 12/11/2015    Platelets decreased ([facility]) 07/31/2015    Other chronic pain 07/30/2015    Postmenopausal vaginal bleeding 07/23/2015    Anxiety 07/15/2015    Menopause 09/25/2014    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/28/2014    Atopic dermatitis 06/02/2014    Dry eye 06/02/2014    Constipation 04/12/2013    Iron deficiency anemia 03/27/2013    Urinary incontinence 03/27/2013    Hypothyroidism 01/10/2013

## 2024-08-05 ENCOUNTER — TELEPHONE (OUTPATIENT)
Dept: FAMILY MEDICINE CLINIC | Facility: CLINIC | Age: 64
End: 2024-08-05

## 2024-08-05 NOTE — TELEPHONE ENCOUNTER
Signature requested     [facility]   Wound Care    [place] folder    Fax [phone]

## 2024-08-06 ENCOUNTER — TELEPHONE (OUTPATIENT)
Age: 64
End: 2024-08-06

## 2024-08-06 NOTE — TELEPHONE ENCOUNTER
Spoke with Lisa pt care giver. Lisa is aware pt is on the schedule for peg placement for the 13th of this month. She wanted to make sure pt had a peg placed that had enfit adapters.     Pt is on two medications that are not crushable Prilosec and Depakote. Lisa wants to know who will change the orders to a form that can be administered through the [employer].    She is agreeable to getting nutrition supplies from [employer] and speaking with a [facility].    I will touch base with Lisa around noon tomorrow. After I have spoken with my nutrition team and have a firm plan in place. Lisa is agreeable to this.

## 2024-08-06 NOTE — TELEPHONE ENCOUNTER
Patients GI provider:  Dr. Barroso    Number to return call: [phone]    Reason for call: Lisa from [facility], the group home where pt resides, called in wanting to provide an update as pt has been pretty stable with her weight. She stated last weight was 88.4 lbs and she is now 87.2 and they would like to proceed with the peg tube placement on 8/13.  She is also requesting a call back as they need to know what end tip is being used to be placed on their order for their supplies. Above is her call back number.     Scheduled procedure/appointment date if applicable: procedure 8/13/24

## 2024-08-07 ENCOUNTER — TELEPHONE (OUTPATIENT)
Dept: FAMILY MEDICINE CLINIC | Facility: CLINIC | Age: 64
End: 2024-08-07

## 2024-08-07 ENCOUNTER — TELEPHONE (OUTPATIENT)
Dept: GASTROENTEROLOGY | Facility: CLINIC | Age: 64
End: 2024-08-07

## 2024-08-07 DIAGNOSIS — K21.9 GASTROESOPHAGEAL REFLUX DISEASE, UNSPECIFIED WHETHER ESOPHAGITIS PRESENT: Primary | ICD-10-CM

## 2024-08-07 DIAGNOSIS — R13.10 DYSPHAGIA, UNSPECIFIED TYPE: Primary | ICD-10-CM

## 2024-08-07 NOTE — TELEPHONE ENCOUNTER
Patient is having Gtube placed on 8/13 and will need change   omeprazole (PriLOSEC) 20 mg   To a suspension form   Brand used by pharmacy   First-omeprazole     Lisa can be reached at [phone]

## 2024-08-08 ENCOUNTER — DOCUMENTATION (OUTPATIENT)
Dept: NUTRITION | Facility: HOSPITAL | Age: 64
End: 2024-08-08

## 2024-08-08 RX ORDER — OMEPRAZOLE
20 KIT
Qty: 900 ML | Refills: 1 | Status: SHIPPED | OUTPATIENT
Start: 2024-08-08 | End: 2024-08-12 | Stop reason: SDUPTHER

## 2024-08-08 NOTE — PROGRESS NOTES
Home Enteral Nutrition Consultation    History:  Per chart review, [facility] GERD, type 2 diabetes mellitus, hypothyroidism, dystonic cerebral palsy, spastic quadriparesis, atopic dermatitis, osteoporosis with osteoarthritis, anxiety, bipolar disorder, ambulatory dysfunction, severe intellectual disability with developmental delay. Resides at a group home. Admitted to the hospital 5/10-5/15/24 with severe sepsis.   Pt initially seen in GI office on 5/23/24 d/t concerns of increasing feeding difficulty and weight loss over several weeks. Was on a pureed diet. Small, frequent meals were recommended; feeding difficulty appeared to be behavioral.  Had follow-up in GI office 6/11/24 - per note, staff from [facility] wanted to pursue PEG tube only if weight loss continues and not before August 1st.   EGD/PEG tube placement subsequently scheduled for 8/13/24.    Weight History:  8/8/23 48.1 kg  11/2/23 47.6 kg  2/8/24 45.3 kg  5/10/24 46 kg  7/19/24 39.7 kg - most recent weight available in chart. Weight decrease of 6.3 kg / 13.7% x2 months, clinically significant    Estimated Nutrition Needs (using most recently documented weight of 39.7 kg):  Calories: 2971-7453 kcals (30-35 kcals/kg)  Protein: 40-48g (1.0-1.2 g/kg)  Fluid: 1191-1389mL (1mL/kcal)    Nutrition-pertinent lab results from 7/19/24 reviewed - K+ elevated at 5.6    Subjective:   Spoke with nurse Gonzalez from pt's group home. Tube feedings will need to be run overnight from 6pm-6am as they do not have staff available during the daytime who can administer bolus feeds. The group home has a dietitian consultant who completes quarterly reviews on all residents and can help to monitor an established enteral nutrition pt. Pt takes metformin and amaryl for diabetes management. Most recent fingersticks documented by facility [id number]. Facility prefers to provide water flushes with medications; pt receives meds at 6am, 4pm, 8pm.    Recommendations:  Feeds to be administered  via pump from 6pm-6am.   Recommend Jevity 1.5 formula, start at 20mL/hr, advance rate by 10mL/hr every 4 hours to goal rate of 70mL/hr x12 hrs.   At goal, will provide 1260 kcals, 54g protein, 638mL water in TF formula  Recommend 60mL water flush before and after TF cycle, and an additional 500mL water daily that can be divided amongst med passes.   Noted elevated potassium on labs from July. Discussed with Lisa regarding when it would be most feasible/efficient to check labs around the time of starting TF.   Recommend checking CMP and Ha1c on day of tube placement, 8/13.   Recommend checking at least BMP on Friday 8/16.     Thank you for this referral.    Nathalie Mueller MS, RD, LDN, CNSC

## 2024-08-08 NOTE — TELEPHONE ENCOUNTER
Changed to oral formulation as requested via g tube.    Floridalma Phillips M.D.  [facility] PGY2  8/8/2024, 6:12 PM

## 2024-08-09 ENCOUNTER — TELEPHONE (OUTPATIENT)
Dept: FAMILY MEDICINE CLINIC | Facility: CLINIC | Age: 64
End: 2024-08-09

## 2024-08-09 DIAGNOSIS — K21.9 GASTROESOPHAGEAL REFLUX DISEASE, UNSPECIFIED WHETHER ESOPHAGITIS PRESENT: ICD-10-CM

## 2024-08-09 LAB
DME PARACHUTE DELIVERY DATE REQUESTED: NORMAL
DME PARACHUTE DELIVERY NOTE: NORMAL
DME PARACHUTE ITEM DESCRIPTION: NORMAL
DME PARACHUTE ORDER STATUS: NORMAL
DME PARACHUTE SUPPLIER NAME: NORMAL
DME PARACHUTE SUPPLIER PHONE: NORMAL

## 2024-08-09 NOTE — TELEPHONE ENCOUNTER
Folder (To be completed by) -  Dr. Sylvester     Name of Form - [facility] Detailed Written Order WC    Color folder ([place])    Form to be Faxed (Fax #), [phone]    Patient was made aware of the 7-10 business day form policy.

## 2024-08-09 NOTE — TELEPHONE ENCOUNTER
The medication    omeprazole (PriLOSEC) 2 mg/m    Changed to liquid but patient use twice a day     But now it is Once a day     Does Dr. Phillips want it once a day instead of twice a day?    Lisa can be reached at [phone]

## 2024-08-09 NOTE — TELEPHONE ENCOUNTER
Folder (To be completed by) -  Dr. Fernando      Name of Form - Prescriber Order  [facility]     Color folder ([place])    Form to be Faxed (Fax #)  [phone]    Patient was made aware of the 7-10 business day form policy.

## 2024-08-12 ENCOUNTER — TELEPHONE (OUTPATIENT)
Dept: GASTROENTEROLOGY | Facility: CLINIC | Age: 64
End: 2024-08-12

## 2024-08-12 DIAGNOSIS — Z13.1 ENCOUNTER FOR SCREENING FOR DIABETES MELLITUS: ICD-10-CM

## 2024-08-12 DIAGNOSIS — E63.9 NUTRITION DEFICIENCY DUE TO INSUFFICIENT FOOD: Primary | ICD-10-CM

## 2024-08-12 DIAGNOSIS — T73.0XXA NUTRITION DEFICIENCY DUE TO INSUFFICIENT FOOD: Primary | ICD-10-CM

## 2024-08-12 RX ORDER — OMEPRAZOLE
20 KIT
Qty: 900 ML | Refills: 1 | Status: SHIPPED | OUTPATIENT
Start: 2024-08-12 | End: 2024-08-13

## 2024-08-12 NOTE — TELEPHONE ENCOUNTER
Spoke with Lisa she will take pt to [facility]'s out pt lab prior to egd appointment. [employer] has reached out to her and will be delivering supplies tomorrow.    I spoke with Selene from [facility] earlier to confirm shipment was being delivered by tomorrow at the latest.

## 2024-08-12 NOTE — TELEPHONE ENCOUNTER
Changed medication to twice daily.    Floridalma Phillips M.D.  [facility] PGY2  8/12/2024, 1:23 PM

## 2024-08-12 NOTE — TELEPHONE ENCOUNTER
Lisa from [facility] returned call. Call was transferred to [facility] to assist.

## 2024-08-12 NOTE — TELEPHONE ENCOUNTER
Called and spoke with bernice from [facility] and left him know orders where placed for blood work. Pt can go to any [facility] lab to have them drawn. I explained there is a [facility] [address] from the [facility]. He stated he would let Lisa know and have her call with any questions.

## 2024-08-13 ENCOUNTER — ANESTHESIA EVENT (OUTPATIENT)
Dept: GASTROENTEROLOGY | Facility: HOSPITAL | Age: 64
End: 2024-08-13

## 2024-08-13 ENCOUNTER — LAB (OUTPATIENT)
Dept: LAB | Facility: CLINIC | Age: 64
End: 2024-08-13
Payer: MEDICARE

## 2024-08-13 ENCOUNTER — RA CDI HCC (OUTPATIENT)
Dept: OTHER | Facility: HOSPITAL | Age: 64
End: 2024-08-13

## 2024-08-13 ENCOUNTER — ANESTHESIA (OUTPATIENT)
Dept: GASTROENTEROLOGY | Facility: HOSPITAL | Age: 64
End: 2024-08-13

## 2024-08-13 ENCOUNTER — HOSPITAL ENCOUNTER (OUTPATIENT)
Dept: GASTROENTEROLOGY | Facility: HOSPITAL | Age: 64
Setting detail: OUTPATIENT SURGERY
Discharge: HOME | End: 2024-08-13
Attending: INTERNAL MEDICINE
Payer: MEDICARE

## 2024-08-13 VITALS
TEMPERATURE: 96.4 F | WEIGHT: 88 LBS | DIASTOLIC BLOOD PRESSURE: 66 MMHG | HEIGHT: 58 IN | RESPIRATION RATE: 18 BRPM | HEART RATE: 55 BPM | SYSTOLIC BLOOD PRESSURE: 114 MMHG | BODY MASS INDEX: 18.47 KG/M2 | OXYGEN SATURATION: 95 %

## 2024-08-13 DIAGNOSIS — E46 PROTEIN-CALORIE MALNUTRITION, UNSPECIFIED SEVERITY (HCC): Primary | ICD-10-CM

## 2024-08-13 DIAGNOSIS — E63.9 NUTRITION DEFICIENCY DUE TO INSUFFICIENT FOOD: ICD-10-CM

## 2024-08-13 DIAGNOSIS — T73.0XXA NUTRITION DEFICIENCY DUE TO INSUFFICIENT FOOD: ICD-10-CM

## 2024-08-13 DIAGNOSIS — Z13.1 ENCOUNTER FOR SCREENING FOR DIABETES MELLITUS: ICD-10-CM

## 2024-08-13 DIAGNOSIS — R63.39 FEEDING DIFFICULTY IN ADULT: ICD-10-CM

## 2024-08-13 DIAGNOSIS — K21.9 GASTROESOPHAGEAL REFLUX DISEASE, UNSPECIFIED WHETHER ESOPHAGITIS PRESENT: ICD-10-CM

## 2024-08-13 LAB
ALBUMIN SERPL BCG-MCNC: 4 G/DL (ref 3.5–5)
ALP SERPL-CCNC: 43 U/L (ref 34–104)
ALT SERPL W P-5'-P-CCNC: 9 U/L (ref 7–52)
ANION GAP SERPL CALCULATED.3IONS-SCNC: 10 MMOL/L (ref 4–13)
AST SERPL W P-5'-P-CCNC: 7 U/L (ref 13–39)
BILIRUB SERPL-MCNC: 0.23 MG/DL (ref 0.2–1)
BUN SERPL-MCNC: 26 MG/DL (ref 5–25)
CALCIUM SERPL-MCNC: 10.2 MG/DL (ref 8.4–10.2)
CHLORIDE SERPL-SCNC: 99 MMOL/L (ref 96–108)
CO2 SERPL-SCNC: 36 MMOL/L (ref 21–32)
CREAT SERPL-MCNC: 0.97 MG/DL (ref 0.6–1.3)
DME PARACHUTE DELIVERY DATE ACTUAL: NORMAL
DME PARACHUTE DELIVERY DATE EXPECTED: NORMAL
DME PARACHUTE DELIVERY DATE REQUESTED: NORMAL
DME PARACHUTE DELIVERY NOTE: NORMAL
DME PARACHUTE ITEM DESCRIPTION: NORMAL
DME PARACHUTE ORDER STATUS: NORMAL
DME PARACHUTE SUPPLIER NAME: NORMAL
DME PARACHUTE SUPPLIER PHONE: NORMAL
EST. AVERAGE GLUCOSE BLD GHB EST-MCNC: 131 MG/DL
GFR SERPL CREATININE-BSD FRML MDRD: 62 ML/MIN/1.73SQ M
GLUCOSE P FAST SERPL-MCNC: 94 MG/DL (ref 65–99)
HBA1C MFR BLD: 6.2 %
POTASSIUM SERPL-SCNC: 4.9 MMOL/L (ref 3.5–5.3)
PROT SERPL-MCNC: 7.1 G/DL (ref 6.4–8.4)
SODIUM SERPL-SCNC: 145 MMOL/L (ref 135–147)

## 2024-08-13 PROCEDURE — 80053 COMPREHEN METABOLIC PANEL: CPT

## 2024-08-13 PROCEDURE — 88305 TISSUE EXAM BY PATHOLOGIST: CPT | Performed by: SPECIALIST

## 2024-08-13 PROCEDURE — 36415 COLL VENOUS BLD VENIPUNCTURE: CPT

## 2024-08-13 PROCEDURE — 83036 HEMOGLOBIN GLYCOSYLATED A1C: CPT

## 2024-08-13 PROCEDURE — 43239 EGD BIOPSY SINGLE/MULTIPLE: CPT | Performed by: INTERNAL MEDICINE

## 2024-08-13 PROCEDURE — 88313 SPECIAL STAINS GROUP 2: CPT | Performed by: SPECIALIST

## 2024-08-13 RX ORDER — LIDOCAINE HYDROCHLORIDE 20 MG/ML
INJECTION, SOLUTION EPIDURAL; INFILTRATION; INTRACAUDAL; PERINEURAL AS NEEDED
Status: DISCONTINUED | OUTPATIENT
Start: 2024-08-13 | End: 2024-08-13

## 2024-08-13 RX ORDER — SODIUM CHLORIDE 9 MG/ML
INJECTION, SOLUTION INTRAVENOUS CONTINUOUS PRN
Status: DISCONTINUED | OUTPATIENT
Start: 2024-08-13 | End: 2024-08-13

## 2024-08-13 RX ORDER — SODIUM CHLORIDE, SODIUM LACTATE, POTASSIUM CHLORIDE, CALCIUM CHLORIDE 600; 310; 30; 20 MG/100ML; MG/100ML; MG/100ML; MG/100ML
INJECTION, SOLUTION INTRAVENOUS CONTINUOUS PRN
Status: DISCONTINUED | OUTPATIENT
Start: 2024-08-13 | End: 2024-08-13

## 2024-08-13 RX ORDER — PROPOFOL 10 MG/ML
INJECTION, EMULSION INTRAVENOUS CONTINUOUS PRN
Status: DISCONTINUED | OUTPATIENT
Start: 2024-08-13 | End: 2024-08-13

## 2024-08-13 RX ORDER — PROPOFOL 10 MG/ML
INJECTION, EMULSION INTRAVENOUS AS NEEDED
Status: DISCONTINUED | OUTPATIENT
Start: 2024-08-13 | End: 2024-08-13

## 2024-08-13 RX ORDER — OMEPRAZOLE
20 KIT
Qty: 900 ML | Refills: 1 | Status: ON HOLD | OUTPATIENT
Start: 2024-08-13 | End: 2025-02-09

## 2024-08-13 RX ORDER — CEFAZOLIN SODIUM 1 G/3ML
INJECTION, POWDER, FOR SOLUTION INTRAMUSCULAR; INTRAVENOUS AS NEEDED
Status: DISCONTINUED | OUTPATIENT
Start: 2024-08-13 | End: 2024-08-13

## 2024-08-13 RX ADMIN — CEFAZOLIN 1000 MG: 1 INJECTION, POWDER, FOR SOLUTION INTRAMUSCULAR; INTRAVENOUS at 08:54

## 2024-08-13 RX ADMIN — SODIUM CHLORIDE: 0.9 INJECTION, SOLUTION INTRAVENOUS at 08:19

## 2024-08-13 RX ADMIN — LIDOCAINE HYDROCHLORIDE 40 MG: 20 INJECTION, SOLUTION EPIDURAL; INFILTRATION; INTRACAUDAL at 08:57

## 2024-08-13 RX ADMIN — PROPOFOL 50 MG: 10 INJECTION, EMULSION INTRAVENOUS at 08:57

## 2024-08-13 RX ADMIN — PROPOFOL 30 MCG/KG/MIN: 10 INJECTION, EMULSION INTRAVENOUS at 09:00

## 2024-08-13 NOTE — ANESTHESIA POSTPROCEDURE EVALUATION
Post-Op Assessment Note    CV Status:  Stable  Pain Score: 0    Pain management: adequate       Mental Status:  Alert and awake   Hydration Status:  Euvolemic   PONV Controlled:  Controlled   Airway Patency:  Patent     Post Op Vitals Reviewed: Yes    No anethesia notable event occurred.    Staff: Anesthesiologist, CRNA               BP  107/65    Temp   96.4   Pulse  62   Resp   16   SpO2   97% RA

## 2024-08-13 NOTE — ANESTHESIA PREPROCEDURE EVALUATION
Procedure:  EGD    Relevant Problems   CARDIO   (+) Hyperlipidemia      ENDO   (+) Hypothyroidism   (+) Type 2 diabetes mellitus, without long-term current use of insulin (HCC)      GI/HEPATIC   (+) Dysphagia   (+) Gastroesophageal reflux disease   (+) 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HCC)      HEMATOLOGY   (+) Anemia   (+) Iron deficiency anemia      MUSCULOSKELETAL   (+) Osteoarthritis of both hips      NEURO/PSYCH   (+) Anxiety   (+) Other chronic pain        Physical Exam    Airway    Mallampati score: unable to assess    Neck ROM: limited     Dental       Cardiovascular  Cardiovascular exam normal    Pulmonary  Pulmonary exam normal     Other Findings  post-pubertal.      Anesthesia Plan  ASA Score- 3     Anesthesia Type- IV sedation with anesthesia with ASA Monitors.         Additional Monitors:     Airway Plan:     Comment: Spoke with care giver and POA. Discussed the possibility that the sedation may not inhibit pt from moving and that images may not be idea. We will do our best to deliver safe anesthesia and good images. They both understand and agree..       Plan Factors-    Chart reviewed.    Patient summary reviewed.                  Induction- intravenous.    Postoperative Plan-     Perioperative Resuscitation Plan - Level 1 - Full Code.       Informed Consent- Anesthetic plan and risks discussed with patient and healthcare power of .  I personally reviewed this patient with the CRNA. Discussed and agreed on the Anesthesia Plan with the CRNA..

## 2024-08-13 NOTE — H&P
History and Physical - [facility]  Terrie Alicea 64 y.o. female MRN: [id number]                  HPI: Terrie Alicea is a 64 y.o. year old female who presents for PEG tube placement due to protein calorie malnutrition. Not currently on AP/AC.      REVIEW OF SYSTEMS: Per the HPI, and otherwise unremarkable.    Historical Information   Past Medical History:   Diagnosis Date    Acute metabolic encephalopathy 12/11/2015    Adjustment disorder     Altered mental status 12/11/2015    Anemia     Bipolar 1 disorder (HCC)     Cerebral palsy (HCC)     Chronic hypernatremia 02/06/2016    Closed fracture of left hip (HCC) 01/19/2016    Closed left hip fracture (HCC)     no surgery    Constipation     Dehydration 02/20/2016    Developmental delay     Diabetes mellitus ([facility])     Difficulty walking     Disease of thyroid gland     Diverticulosis     Dysphagia     Worsening    Fracture of multiple ribs of right side     Herpes zoster without complication 06/02/2021    Hip fracture ([facility]) 07/26/2015    left    Hyperlipidemia     Hypernatremia 12/28/2018    Hypotension     Impulse control disorder     Incontinence     Intellectual disability due to developmental disorder, unspecified     Microalbuminuria     Neuropathy in diabetes ([facility])     Osteopathia     Osteoporosis     Peripheral neuropathy     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[facility])     Thrombocytopenia ([facility]) 07/31/2015     Past Surgical History:   Procedure Laterality Date    ABDOMINAL SURGERY      COLECTOMY MIN      re-anastomosis 7/22/16    COLON SURGERY  1/31/16    COLONOSCOPY N/A 07/21/2016    Procedure: COLONOSCOPY;  Surgeon: Rosalino Jacome MD;  Location: [facility];  Service:     COLONOSCOPY N/A 01/31/2016    Procedure: COLONOSCOPY;  Surgeon: Rosalino Jacome MD;  [facility];  Service:     COLONOSCOPY N/A 07/25/2017    Procedure: COLONOSCOPY;  Surgeon: Rosalino Jacome MD;  Location: [facility];  Service: Colorectal    COLOSTOMY      EXPLORATORY  LAPAROTOMY W/ BOWEL RESECTION N/A 01/31/2016    Procedure: exploratory laparotomy, left sigmoidectomy, coloproctostomy, take down splenic flexure, loop colostomy;  Surgeon: Rosalino Jacome MD;  [facility];  Service:     MS CLOSURE ENTEROSTOMY LG/SMALL INTESTINE N/A 07/22/2016    Procedure: SEGMENTAL COLECTOMY WITH COLOCOLOSTOMY;  Surgeon: Rosalino Jacome MD;  [facility];  Service: Colorectal    UPPER GASTROINTESTINAL ENDOSCOPY       Social History   Social History     Substance and Sexual Activity   Alcohol Use Not Currently     Social History     Substance and Sexual Activity   Drug Use No     Social History     Tobacco Use   Smoking Status Never   Smokeless Tobacco Never     Family History   Problem Relation Age of Onset    Alcohol abuse Mother     Alcohol abuse Father     Diabetes Sister     No Known Problems Sister     No Known Problems Sister        Meds/Allergies       Current Outpatient Medications:     ARIPiprazole (ABILIFY) 2 mg tablet    Ascorbic Acid (vitamin C) 1000 MG tablet    baclofen 10 mg tablet    calcium carbonate (OS-MORRIS) 600 MG tablet    carbidopa-levodopa (SINEMET CR)  mg TBCR per ER tablet    cholecalciferol (VITAMIN D3) 1,000 units tablet    citalopram (CeleXA) 40 mg tablet    divalproex sodium (DEPAKOTE SPRINKLE) 125 MG capsule    docosanol (ABREVA) 10 %    ferrous sulfate 324 (65 Fe) mg    glimepiride (AMARYL) 1 mg tablet    levothyroxine 25 mcg tablet    LORazepam (ATIVAN) 0.5 mg tablet    metFORMIN (GLUCOPHAGE) 1000 MG tablet    methenamine hippurate (HIPREX) 1 g tablet    omeprazole (PriLOSEC) 2 mg/mL oral suspension    oxybutynin (DITROPAN) 5 mg tablet    polyethylene glycol (GLYCOLAX) 17 GM/SCOOP powder    Refresh Liquigel 1 % GEL    simvastatin (ZOCOR) 20 mg tablet    traZODone (DESYREL) 100 mg tablet    Vitamins A & D (VITAMIN A & D) ointment    benzonatate (TESSALON PERLES) 100 mg capsule    carbamide peroxide (DEBROX) 6.5 % otic solution    denosumab  "(Prolia) 60 mg/mL    DIABETIC TUSSIN  MG/5ML oral liquid    fexofenadine (ALLEGRA) 30 MG/5ML suspension    fluticasone (FLONASE) 50 mcg/act nasal spray    hydrocortisone 1 % cream    neomycin-bacitracin-polymyxin b (NEOSPORIN) ointment    olopatadine HCl (PATADAY) 0.2 % opth drops    Starch, Thickening, POWD    Allergies   Allergen Reactions    Keflex [Cephalexin] GI Intolerance       Objective     /60   Pulse 60   Temp 97.8 °F (36.6 °C) (Tympanic)   Resp 16   Ht 4' 10\" (1.473 m)   Wt 39.9 kg (88 lb)   LMP 11/29/2009 (Exact Date)   SpO2 98%   BMI 18.39 kg/m²       PHYSICAL EXAM    Gen: NAD  Head: NCAT  CV: RRR  CHEST: Clear  ABD: soft, NT/ND  EXT: no edema      ASSESSMENT/PLAN:  This is a 64 y.o. year old female here for EGD with PEG placement, and she is stable and optimized for her procedure.        "

## 2024-08-13 NOTE — LETTER
[facility] ENDOSCOPY  [address]  [place] [address]  Dept: [phone]    August 13, 2024     Patient: Terrie Alicea   YOB: 1960   Date of Visit: 8/13/2024       To Whom it May Concern:    Terrie Alicea is under my professional care. She was seen in the hospital from 8/13/2024 to 08/13/24. She may return to day program on 8/14/24 and resume her previous daily activities.     If you have any questions or concerns, please don't hesitate to call.         Sincerely,          Corrina Leos, DO

## 2024-08-14 PROCEDURE — 88305 TISSUE EXAM BY PATHOLOGIST: CPT | Performed by: SPECIALIST

## 2024-08-14 PROCEDURE — 88313 SPECIAL STAINS GROUP 2: CPT | Performed by: SPECIALIST

## 2024-08-15 ENCOUNTER — TELEPHONE (OUTPATIENT)
Age: 64
End: 2024-08-15

## 2024-08-18 ENCOUNTER — TELEPHONE (OUTPATIENT)
Dept: OTHER | Facility: HOSPITAL | Age: 64
End: 2024-08-18

## 2024-08-18 DIAGNOSIS — F31.9 BIPOLAR AFFECTIVE DISORDER, REMISSION STATUS UNSPECIFIED (HCC): ICD-10-CM

## 2024-08-18 DIAGNOSIS — M81.0 OSTEOPOROSIS, UNSPECIFIED OSTEOPOROSIS TYPE, UNSPECIFIED PATHOLOGICAL FRACTURE PRESENCE: ICD-10-CM

## 2024-08-18 DIAGNOSIS — I69.398 SPASTICITY AS LATE EFFECT OF CEREBROVASCULAR ACCIDENT (CVA): ICD-10-CM

## 2024-08-18 DIAGNOSIS — J30.2 SEASONAL ALLERGIES: ICD-10-CM

## 2024-08-18 DIAGNOSIS — G20.C PARKINSONISM, UNSPECIFIED PARKINSONISM TYPE: ICD-10-CM

## 2024-08-18 DIAGNOSIS — R32 URINARY INCONTINENCE, UNSPECIFIED TYPE: ICD-10-CM

## 2024-08-18 DIAGNOSIS — M81.0 OSTEOPOROSIS: ICD-10-CM

## 2024-08-18 DIAGNOSIS — R25.2 SPASTICITY AS LATE EFFECT OF CEREBROVASCULAR ACCIDENT (CVA): ICD-10-CM

## 2024-08-18 DIAGNOSIS — E78.5 HYPERLIPIDEMIA, UNSPECIFIED HYPERLIPIDEMIA TYPE: ICD-10-CM

## 2024-08-18 DIAGNOSIS — E03.9 HYPOTHYROIDISM, UNSPECIFIED TYPE: ICD-10-CM

## 2024-08-18 DIAGNOSIS — R05.9 COUGH: ICD-10-CM

## 2024-08-18 DIAGNOSIS — B34.9 VIRAL ILLNESS: ICD-10-CM

## 2024-08-18 DIAGNOSIS — K59.00 CONSTIPATION, UNSPECIFIED CONSTIPATION TYPE: ICD-10-CM

## 2024-08-18 DIAGNOSIS — E11.69 TYPE 2 DIABETES MELLITUS WITH OTHER SPECIFIED COMPLICATION, WITHOUT LONG-TERM CURRENT USE OF INSULIN (HCC): ICD-10-CM

## 2024-08-18 DIAGNOSIS — E11.9 TYPE 2 DIABETES MELLITUS WITHOUT COMPLICATION, WITHOUT LONG-TERM CURRENT USE OF INSULIN (HCC): ICD-10-CM

## 2024-08-18 RX ORDER — CHOLECALCIFEROL (VITAMIN D3) 25 MCG
1000 TABLET ORAL DAILY
Qty: 90 TABLET | Refills: 0 | Status: ON HOLD | OUTPATIENT
Start: 2024-08-18

## 2024-08-18 RX ORDER — TRAZODONE HYDROCHLORIDE 100 MG/1
100 TABLET ORAL
Qty: 90 TABLET | Refills: 0 | Status: ON HOLD | OUTPATIENT
Start: 2024-08-18

## 2024-08-18 RX ORDER — GLIMEPIRIDE 1 MG/1
1 TABLET ORAL
Qty: 28 TABLET | Refills: 0 | Status: ON HOLD | OUTPATIENT
Start: 2024-08-18

## 2024-08-18 RX ORDER — POLYETHYLENE GLYCOL 3350 17 G/17G
17 POWDER, FOR SOLUTION ORAL DAILY
Qty: 510 G | Refills: 0 | Status: ON HOLD | OUTPATIENT
Start: 2024-08-18

## 2024-08-18 RX ORDER — GUAIFENESIN 200 MG/10ML
200 LIQUID ORAL 4 TIMES DAILY PRN
Qty: 118 ML | Refills: 0 | Status: ON HOLD | OUTPATIENT
Start: 2024-08-18

## 2024-08-18 RX ORDER — LEVOTHYROXINE SODIUM 25 UG/1
25 TABLET ORAL EVERY MORNING
Qty: 28 TABLET | Refills: 0 | Status: ON HOLD | OUTPATIENT
Start: 2024-08-18

## 2024-08-18 RX ORDER — BACLOFEN 10 MG/1
10 TABLET ORAL 3 TIMES DAILY
Qty: 84 TABLET | Refills: 0 | Status: ON HOLD | OUTPATIENT
Start: 2024-08-18

## 2024-08-18 RX ORDER — PHENOL 1.4 %
600 AEROSOL, SPRAY (ML) MUCOUS MEMBRANE 2 TIMES DAILY WITH MEALS
Qty: 60 TABLET | Refills: 0 | Status: ON HOLD | OUTPATIENT
Start: 2024-08-18

## 2024-08-18 RX ORDER — SIMVASTATIN 20 MG
20 TABLET ORAL
Qty: 28 TABLET | Refills: 0 | Status: ON HOLD | OUTPATIENT
Start: 2024-08-18

## 2024-08-18 RX ORDER — OXYBUTYNIN CHLORIDE 5 MG/1
5 TABLET ORAL 3 TIMES DAILY
Qty: 84 TABLET | Refills: 0 | Status: ON HOLD | OUTPATIENT
Start: 2024-08-18

## 2024-08-18 NOTE — TELEPHONE ENCOUNTER
----- Message from Juan David Villar MD sent at 8/7/2024 12:16 PM EDT -----  Regarding: RE: Gtube  Good afternoon, I am no longer Terrie's PCP, but I will forward the message along to the team. Thank you!  ----- Message -----  From: Niyah Corado RN  Sent: 8/7/2024  11:38 AM EDT  To: Juan David Villar MD  Subject: Gtube                                            Hi,  The patient will be getting a peg placed on 8/13/2024.  Some of her medication will need to changed so that they cam be administered via her Gtube if that is the desired route.    Thank you,     Niyah BARKSDALE, BSN.

## 2024-08-19 ENCOUNTER — TELEPHONE (OUTPATIENT)
Dept: FAMILY MEDICINE CLINIC | Facility: CLINIC | Age: 64
End: 2024-08-19

## 2024-08-19 DIAGNOSIS — Z51.81 MEDICATION MONITORING ENCOUNTER: Primary | ICD-10-CM

## 2024-08-19 NOTE — TELEPHONE ENCOUNTER
Signature requested     [facility]  Wound Care    [place] folder    Fax [phone]

## 2024-08-19 NOTE — TELEPHONE ENCOUNTER
Pari called noting there facility had administered another patient medications at 845Am to alexis after she received her medications at 6Am,     Additional medications administered:  2 Depakote   2 Senna  1 Zyrtec  2 Doses in Miralax  1 Eliquis     Spoke with Dr. Lindsay and Advised Pari as per provider, Hold Evening Depakote and tomorrow have Depakote and Liver function labs drawn, she will monitor for Bleeding or any changes in behavior,

## 2024-08-20 ENCOUNTER — APPOINTMENT (OUTPATIENT)
Dept: LAB | Facility: CLINIC | Age: 64
End: 2024-08-20
Payer: MEDICARE

## 2024-08-20 ENCOUNTER — HOSPITAL ENCOUNTER (INPATIENT)
Facility: HOSPITAL | Age: 64
LOS: 3 days | Discharge: HOME | DRG: 871 | End: 2024-08-23
Attending: EMERGENCY MEDICINE | Admitting: INTERNAL MEDICINE
Payer: MEDICARE

## 2024-08-20 ENCOUNTER — APPOINTMENT (EMERGENCY)
Dept: RADIOLOGY | Facility: HOSPITAL | Age: 64
DRG: 871 | End: 2024-08-20
Payer: MEDICARE

## 2024-08-20 DIAGNOSIS — B00.1 HERPES LABIALIS: ICD-10-CM

## 2024-08-20 DIAGNOSIS — A41.9 SEPSIS (HCC): Primary | ICD-10-CM

## 2024-08-20 DIAGNOSIS — Z51.81 MEDICATION MONITORING ENCOUNTER: ICD-10-CM

## 2024-08-20 DIAGNOSIS — N39.0 URINARY TRACT INFECTION: ICD-10-CM

## 2024-08-20 DIAGNOSIS — E87.20 LACTIC ACIDOSIS: ICD-10-CM

## 2024-08-20 PROBLEM — R53.2 FUNCTIONAL QUADRIPLEGIA (HCC): Status: ACTIVE | Noted: 2024-08-20

## 2024-08-20 LAB
ALBUMIN SERPL BCG-MCNC: 3.8 G/DL (ref 3.5–5)
ALBUMIN SERPL BCG-MCNC: 3.9 G/DL (ref 3.5–5)
ALP SERPL-CCNC: 85 U/L (ref 34–104)
ALP SERPL-CCNC: 86 U/L (ref 34–104)
ALT SERPL W P-5'-P-CCNC: 22 U/L (ref 7–52)
ALT SERPL W P-5'-P-CCNC: 33 U/L (ref 7–52)
AMMONIA PLAS-SCNC: 31 UMOL/L (ref 18–72)
ANION GAP SERPL CALCULATED.3IONS-SCNC: 11 MMOL/L (ref 4–13)
ANION GAP SERPL CALCULATED.3IONS-SCNC: 13 MMOL/L (ref 4–13)
APTT PPP: 25 SECONDS (ref 23–34)
AST SERPL W P-5'-P-CCNC: 57 U/L (ref 13–39)
AST SERPL W P-5'-P-CCNC: 61 U/L (ref 13–39)
BACTERIA UR QL AUTO: ABNORMAL /HPF
BASE EX.OXY STD BLDV CALC-SCNC: 83.3 % (ref 60–80)
BASE EXCESS BLDV CALC-SCNC: 4.2 MMOL/L
BASOPHILS # BLD AUTO: 0.03 THOUSANDS/ÂΜL (ref 0–0.1)
BASOPHILS NFR BLD AUTO: 1 % (ref 0–1)
BILIRUB SERPL-MCNC: 0.2 MG/DL (ref 0.2–1)
BILIRUB SERPL-MCNC: 0.22 MG/DL (ref 0.2–1)
BILIRUB UR QL STRIP: NEGATIVE
BUN SERPL-MCNC: 25 MG/DL (ref 5–25)
BUN SERPL-MCNC: 26 MG/DL (ref 5–25)
CALCIUM SERPL-MCNC: 10.8 MG/DL (ref 8.4–10.2)
CALCIUM SERPL-MCNC: 11 MG/DL (ref 8.4–10.2)
CHLORIDE SERPL-SCNC: 98 MMOL/L (ref 96–108)
CHLORIDE SERPL-SCNC: 99 MMOL/L (ref 96–108)
CLARITY UR: ABNORMAL
CO2 SERPL-SCNC: 27 MMOL/L (ref 21–32)
CO2 SERPL-SCNC: 28 MMOL/L (ref 21–32)
COLOR UR: YELLOW
CREAT SERPL-MCNC: 1.04 MG/DL (ref 0.6–1.3)
CREAT SERPL-MCNC: 1.09 MG/DL (ref 0.6–1.3)
EOSINOPHIL # BLD AUTO: 0.04 THOUSAND/ÂΜL (ref 0–0.61)
EOSINOPHIL NFR BLD AUTO: 1 % (ref 0–6)
ERYTHROCYTE [DISTWIDTH] IN BLOOD BY AUTOMATED COUNT: 13.2 % (ref 11.6–15.1)
ERYTHROCYTE [DISTWIDTH] IN BLOOD BY AUTOMATED COUNT: 13.2 % (ref 11.6–15.1)
FLUAV RNA RESP QL NAA+PROBE: NEGATIVE
FLUBV RNA RESP QL NAA+PROBE: NEGATIVE
GFR SERPL CREATININE-BSD FRML MDRD: 54 ML/MIN/1.73SQ M
GFR SERPL CREATININE-BSD FRML MDRD: 57 ML/MIN/1.73SQ M
GLUCOSE SERPL-MCNC: 168 MG/DL (ref 65–140)
GLUCOSE SERPL-MCNC: 265 MG/DL (ref 65–140)
GLUCOSE UR STRIP-MCNC: NEGATIVE MG/DL
HCO3 BLDV-SCNC: 29.6 MMOL/L (ref 24–30)
HCT VFR BLD AUTO: 36.3 % (ref 34.8–46.1)
HCT VFR BLD AUTO: 36.8 % (ref 34.8–46.1)
HGB BLD-MCNC: 11.9 G/DL (ref 11.5–15.4)
HGB BLD-MCNC: 12 G/DL (ref 11.5–15.4)
HGB UR QL STRIP.AUTO: ABNORMAL
IMM GRANULOCYTES # BLD AUTO: 0.01 THOUSAND/UL (ref 0–0.2)
IMM GRANULOCYTES NFR BLD AUTO: 0 % (ref 0–2)
INR PPP: 0.9 (ref 0.85–1.19)
INR PPP: 0.94 (ref 0.85–1.19)
KETONES UR STRIP-MCNC: ABNORMAL MG/DL
LACTATE SERPL-SCNC: 2.3 MMOL/L (ref 0.5–2)
LACTATE SERPL-SCNC: 2.8 MMOL/L (ref 0.5–2)
LACTATE SERPL-SCNC: 3.9 MMOL/L (ref 0.5–2)
LEUKOCYTE ESTERASE UR QL STRIP: ABNORMAL
LYMPHOCYTES # BLD AUTO: 0.63 THOUSANDS/ÂΜL (ref 0.6–4.47)
LYMPHOCYTES NFR BLD AUTO: 15 % (ref 14–44)
MCH RBC QN AUTO: 32.8 PG (ref 26.8–34.3)
MCH RBC QN AUTO: 32.8 PG (ref 26.8–34.3)
MCHC RBC AUTO-ENTMCNC: 32.6 G/DL (ref 31.4–37.4)
MCHC RBC AUTO-ENTMCNC: 32.8 G/DL (ref 31.4–37.4)
MCV RBC AUTO: 100 FL (ref 82–98)
MCV RBC AUTO: 101 FL (ref 82–98)
MONOCYTES # BLD AUTO: 0.3 THOUSAND/ÂΜL (ref 0.17–1.22)
MONOCYTES NFR BLD AUTO: 7 % (ref 4–12)
NEUTROPHILS # BLD AUTO: 3.3 THOUSANDS/ÂΜL (ref 1.85–7.62)
NEUTS SEG NFR BLD AUTO: 76 % (ref 43–75)
NITRITE UR QL STRIP: NEGATIVE
NON-SQ EPI CELLS URNS QL MICRO: ABNORMAL /HPF
NRBC BLD AUTO-RTO: 0 /100 WBCS
O2 CT BLDV-SCNC: 12.5 ML/DL
PCO2 BLDV: 48.4 MM HG (ref 42–50)
PH BLDV: 7.41 [PH] (ref 7.3–7.4)
PH UR STRIP.AUTO: 6.5 [PH]
PLATELET # BLD AUTO: 106 THOUSANDS/UL (ref 149–390)
PLATELET # BLD AUTO: 111 THOUSANDS/UL (ref 149–390)
PMV BLD AUTO: 12 FL (ref 8.9–12.7)
PMV BLD AUTO: 12.3 FL (ref 8.9–12.7)
PO2 BLDV: 49.8 MM HG (ref 35–45)
POTASSIUM SERPL-SCNC: 4.5 MMOL/L (ref 3.5–5.3)
POTASSIUM SERPL-SCNC: 4.8 MMOL/L (ref 3.5–5.3)
PROCALCITONIN SERPL-MCNC: 1.8 NG/ML
PROT SERPL-MCNC: 7.3 G/DL (ref 6.4–8.4)
PROT SERPL-MCNC: 7.4 G/DL (ref 6.4–8.4)
PROT UR STRIP-MCNC: ABNORMAL MG/DL
PROTHROMBIN TIME: 12.4 SECONDS (ref 12.3–15)
PROTHROMBIN TIME: 13.3 SECONDS (ref 12.3–15)
RBC # BLD AUTO: 3.63 MILLION/UL (ref 3.81–5.12)
RBC # BLD AUTO: 3.66 MILLION/UL (ref 3.81–5.12)
RBC #/AREA URNS AUTO: ABNORMAL /HPF
RSV RNA RESP QL NAA+PROBE: NEGATIVE
SARS-COV-2 RNA RESP QL NAA+PROBE: NEGATIVE
SODIUM SERPL-SCNC: 137 MMOL/L (ref 135–147)
SODIUM SERPL-SCNC: 139 MMOL/L (ref 135–147)
SP GR UR STRIP.AUTO: 1.02 (ref 1–1.03)
UROBILINOGEN UR STRIP-ACNC: <2 MG/DL
WBC # BLD AUTO: 3.56 THOUSAND/UL (ref 4.31–10.16)
WBC # BLD AUTO: 4.31 THOUSAND/UL (ref 4.31–10.16)
WBC #/AREA URNS AUTO: ABNORMAL /HPF
WBC CLUMPS # UR AUTO: PRESENT /UL

## 2024-08-20 PROCEDURE — 85027 COMPLETE CBC AUTOMATED: CPT

## 2024-08-20 PROCEDURE — 80165 DIPROPYLACETIC ACID FREE: CPT

## 2024-08-20 PROCEDURE — 82140 ASSAY OF AMMONIA: CPT | Performed by: INTERNAL MEDICINE

## 2024-08-20 PROCEDURE — 83605 ASSAY OF LACTIC ACID: CPT

## 2024-08-20 PROCEDURE — 81001 URINALYSIS AUTO W/SCOPE: CPT

## 2024-08-20 PROCEDURE — 85610 PROTHROMBIN TIME: CPT

## 2024-08-20 PROCEDURE — 80053 COMPREHEN METABOLIC PANEL: CPT

## 2024-08-20 PROCEDURE — 0241U HB NFCT DS VIR RESP RNA 4 TRGT: CPT

## 2024-08-20 PROCEDURE — 36415 COLL VENOUS BLD VENIPUNCTURE: CPT

## 2024-08-20 PROCEDURE — 85025 COMPLETE CBC W/AUTO DIFF WBC: CPT

## 2024-08-20 PROCEDURE — 87186 SC STD MICRODIL/AGAR DIL: CPT

## 2024-08-20 PROCEDURE — 82805 BLOOD GASES W/O2 SATURATION: CPT | Performed by: INTERNAL MEDICINE

## 2024-08-20 PROCEDURE — 87086 URINE CULTURE/COLONY COUNT: CPT

## 2024-08-20 PROCEDURE — 85730 THROMBOPLASTIN TIME PARTIAL: CPT

## 2024-08-20 PROCEDURE — 99285 EMERGENCY DEPT VISIT HI MDM: CPT

## 2024-08-20 PROCEDURE — 71045 X-RAY EXAM CHEST 1 VIEW: CPT

## 2024-08-20 PROCEDURE — 96365 THER/PROPH/DIAG IV INF INIT: CPT

## 2024-08-20 PROCEDURE — 87077 CULTURE AEROBIC IDENTIFY: CPT

## 2024-08-20 PROCEDURE — 87040 BLOOD CULTURE FOR BACTERIA: CPT

## 2024-08-20 PROCEDURE — 93005 ELECTROCARDIOGRAM TRACING: CPT

## 2024-08-20 PROCEDURE — 96367 TX/PROPH/DG ADDL SEQ IV INF: CPT

## 2024-08-20 PROCEDURE — 84145 PROCALCITONIN (PCT): CPT

## 2024-08-20 PROCEDURE — 99223 1ST HOSP IP/OBS HIGH 75: CPT | Performed by: INTERNAL MEDICINE

## 2024-08-20 RX ORDER — ALBUTEROL SULFATE 0.83 MG/ML
2.5 SOLUTION RESPIRATORY (INHALATION) EVERY 4 HOURS PRN
Status: DISCONTINUED | OUTPATIENT
Start: 2024-08-20 | End: 2024-08-23 | Stop reason: HOSPADM

## 2024-08-20 RX ADMIN — SODIUM CHLORIDE 1000 ML: 0.9 INJECTION, SOLUTION INTRAVENOUS at 13:49

## 2024-08-20 RX ADMIN — SODIUM CHLORIDE 1000 ML: 0.9 INJECTION, SOLUTION INTRAVENOUS at 12:45

## 2024-08-20 RX ADMIN — CEFTRIAXONE SODIUM 1000 MG: 10 INJECTION, POWDER, FOR SOLUTION INTRAVENOUS at 13:50

## 2024-08-20 RX ADMIN — Medication 600 MG: at 13:07

## 2024-08-20 NOTE — ASSESSMENT & PLAN NOTE
Patient has hypoxic respiratory failure acute-due to possibly atelectasis or poor inspiratory effort.  However official read of chest x-ray pending.  In the meanwhile we will continue treating the patient with IV ceftriaxone.  COVID-19 swab and RSV and flu were negative.  Cannot rule out aspiration pneumonia which could be present on admission due to lethargy and weakness.  Patient chronically has dysphagia as well.  Follow culture data.  Trend procalcitonin.  Treat the patient with oxygen, however patient lung sounds are clear, may consider offering nebulizers for congestion as needed.

## 2024-08-20 NOTE — H&P
[facility]  H&P  Name: Terrie Alicea 64 y.o. female I MRN: [id number]  Unit/Bed#: ED-31 I Date of Admission: 8/20/2024   Date of Service: 8/20/2024 I Hospital Day: 0      Assessment & Plan   * Severe sepsis (HCC)  Assessment & Plan  Most likely secondary to urinary tract infection.  We will admit the patient to medical surgical unit as inpatient.  Patient has severe sepsis, lactic acid 3.9, however patient chronically has low blood pressure sometimes running in the low 100s.  After aggressive IV fluid resuscitation 30 mL/kg IV bolus, patient's lactic acid is trending downwards, will continue to check lactic acid and trend.  Continue maintenance IV fluids with D5 half-normal saline at 75 cc an hour.      Acute metabolic encephalopathy  Assessment & Plan  Most likely secondary to severe sepsis from UTI.  Chest x-ray officially not read but does not show any significant opacities.  Treat the patient with IV ceftriaxone, follow-up culture data.  Change patient's trazodone to 50 mg nightly as needed insomnia  Discontinue Ativan nightly.  Until patient mental status much better.  Discussed this with the patient's routine caregiver Lisa and she is in agreement with this plan of care.    Severe Intellectual disability  Assessment & Plan  Patient chronically lives in a care home.  Bedbound and wheelchair-bound and dependent for all her ADLs.      Dysphagia  Assessment & Plan  Patient chronically has had dysphagia and was scheduled to get up PEG tube placed however was not able to achieve that by GI.  Hence supposed to see general surgery as an outpatient-Dr. Walker.   After patient's active issue resolved she can follow-up with general surgery as an outpatient.  In the meanwhile we will have the patient on dysphagia level 1 with honey thick liquid diet.  Consult speech therapist.    Acute respiratory failure with hypoxia (HCC)  Assessment & Plan  Patient has hypoxic respiratory failure  "acute-due to possibly atelectasis or poor inspiratory effort.  However official read of chest x-ray pending.  In the meanwhile we will continue treating the patient with IV ceftriaxone.  COVID-19 swab and RSV and flu were negative.  Cannot rule out aspiration pneumonia which could be present on admission due to lethargy and weakness.  Patient chronically has dysphagia as well.  Follow culture data.  Trend procalcitonin.    Dystonic cerebral palsy (HCC)  Assessment & Plan  Continue patient on Sinemet,  continue patient on baclofen.    Functional quadriplegia (HCC)  Assessment & Plan  Continue patient on baclofen due to spasticity.    Hypothyroidism  Assessment & Plan  Continue on Synthroid daily.    Constipation  Assessment & Plan  Continue on MiraLAX daily.    Type 2 diabetes mellitus, without long-term current use of insulin ([facility])  Assessment & Plan  Lab Results   Component Value Date    HGBA1C 6.2 (H) 08/13/2024       No results for input(s): \"POCGLU\" in the last 72 hours.    Blood Sugar Average: Last 72 hrs:  Carbohydrate diet. -Puréed honey thick liquid, avoid concentrated sweets.  Will start patient on low-dose sliding scale insulin with Accu-Chek 4 times daily.    Bipolar disorder ([facility])  Assessment & Plan  We will hold patient's Depakote dose for now until Depakote level is back and patient mentally more awake.  Check ammonia level.           Acute hypoxic respiratory failure: Possible secondary to atelectasis and poor inspiratory effort, however aspiration pneumonia cannot be ruled out.  This could be present on admission.  In the meanwhile treat the patient with IV ceftriaxone, follow culture data.  Nebulizers and oxygen.    Date of Service:   08/20/24    VTE Prophylaxis: VTE Score: 4 Moderate Risk (Score 3-4) - Pharmacological DVT Prophylaxis Ordered: enoxaparin (Lovenox).  Code Status: Full Code.   POLST:There is no POLST form on file for this patient (pre-hospital)   Discussion with family:  Updated "  (Care Giver) at bedside.    Anticipated Length of Stay:  Patient will be admitted on an Inpatient basis with an anticipated length of stay of  > 2 midnights.   Justification for Hospital Stay: Severe Sepsis 2/2 UTI Suspected.     Total Time for Visit, including Counseling / Coordination of Care: 45 minutes.  Greater than 50% of this total time spent on direct patient counseling and coordination of care.    Chief Complaint:     Fever (As per caregiver pt had chills today, lethargic, fevers, also had a med error yesterday received extra meds)    History of Present Illness:    Terrie Alicea is a 64 y.o. female with PMHx significant for spastic cerebral palsy, wheelchair-bound, dysphagia who presents with chief complaints of fevers, chills, and lethargy since 1 day.    Patient yesterday accidentally received extra dose of Depakote along with some laxative medications however her evening dose of Depakote routine was held.  Overall patient received slightly lower dose total compared to her routine of Depakote.  But this morning patient was lethargic and having chills after her  visit.  Patient then was brought to the ED for further evaluation.    In the ED patient was found to have a Tmax of 102.8, patient was tachycardic 212 bpm, and      Review of Systems:  Review of Systems: A thorough 10 point review of System was done which was negative for other than that mentioned in HPI.     Past Medical and Surgical History:     Past Medical History:   Diagnosis Date    Acute metabolic encephalopathy 12/11/2015    Adjustment disorder     Altered mental status 12/11/2015    Anemia     Bipolar 1 disorder (HCC)     Cerebral palsy (HCC)     Chronic hypernatremia 02/06/2016    Closed fracture of left hip (HCC) 01/19/2016    Closed left hip fracture (HCC)     no surgery    Constipation     Dehydration 02/20/2016    Developmental delay     Diabetes mellitus (HCC)     Difficulty walking     Disease of thyroid  gland     Diverticulosis     Dysphagia     Worsening    Fracture of multiple ribs of right side     Herpes zoster without complication 06/02/2021    Hip fracture ([facility]) 07/26/2015    left    Hyperlipidemia     Hypernatremia 12/28/2018    Hypotension     Impulse control disorder     Incontinence     Intellectual disability due to developmental disorder, unspecified     Microalbuminuria     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)     Osteopathia     Osteoporosis     Peripheral neuropathy     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HCC)     Thrombocytopenia ([facility]) 07/31/2015       Past Surgical History:   Procedure Laterality Date    ABDOMINAL SURGERY      COLECTOMY MIN      re-anastomosis 7/22/16    COLON SURGERY  1/31/16    COLONOSCOPY N/A 07/21/2016    Procedure: COLONOSCOPY;  Surgeon: Rosalino Jacome MD;  Location: [facility];  Service:     COLONOSCOPY N/A 01/31/2016    Procedure: COLONOSCOPY;  Surgeon: Rosalino Jacome MD;  Location: [facility];  Service:     COLONOSCOPY N/A 07/25/2017    Procedure: COLONOSCOPY;  Surgeon: Rosalino Jacome MD;  Location: [facility];  Service: Colorectal    COLOSTOMY      EXPLORATORY LAPAROTOMY W/ BOWEL RESECTION N/A 01/31/2016    Procedure: exploratory laparotomy, left sigmoidectomy, coloproctostomy, take down splenic flexure, loop colostomy;  Surgeon: Rosalino Jacome MD;  Location: [facility];  Service:     MI CLOSURE ENTEROSTOMY LG/SMALL INTESTINE N/A 07/22/2016    Procedure: SEGMENTAL COLECTOMY WITH COLOCOLOSTOMY;  Surgeon: Rosalino Jacome MD;  Location: [facility];  Service: Colorectal    UPPER GASTROINTESTINAL ENDOSCOPY         Meds/Allergies:  Prior to Admission medications    Medication Sig Start Date End Date Taking? Authorizing Provider   ARIPiprazole (ABILIFY) 2 mg tablet 1 tablet by Per G Tube route daily at bedtime 6/8/22   Historical Provider, MD   Ascorbic Acid (vitamin C) 1000 MG tablet Crush 1 tablet daily 7/19/24   LEATHA Wright   baclofen 10 mg tablet 1 tablet (10 mg total) by  Per G Tube route 3 (three) times a day 8/18/24   Aden Mcgregor MD   benzonatate (TESSALON PERLES) 100 mg capsule TAKE 1 CAPSULE BY MOUTH EVERY 8 HOURS AS NEEDED FOR COUGH **DO NOT CRUSH** 3/13/23   Juan David Villar MD   calcium carbonate (OS-MORRIS) 600 MG tablet 1 tablet (600 mg total) by Per G Tube route 2 (two) times a day with meals 8/18/24   Aden Mcgregor MD   carbamide peroxide (DEBROX) 6.5 % otic solution 2 drops 2 drops in the affected ear prn x 5 days    Historical Provider, MD   carbidopa-levodopa (SINEMET CR)  mg TBCR per ER tablet TAKE 2 TABLETS BY MOUTH (50/200MG) TWICE A DAY (PARKINSONS DISEASE) 3/26/24   Juan David Villar MD   cholecalciferol (VITAMIN D3) 1,000 units tablet 1 tablet (1,000 Units total) by Per G Tube route daily 8/18/24   Aden Mcgregor MD   citalopram (CeleXA) 40 mg tablet 1 tablet by Per G Tube route daily 9/28/22   Historical Provider, MD   denosumab (Prolia) 60 mg/mL Inject 1 mL (60 mg total) under the skin once for 1 dose To be administered on 7/2/24 5/30/24 7/19/24  Juan David Villar MD   divalproex sodium (DEPAKOTE SPRINKLE) 125 MG capsule 3 capsules by Per G Tube route 2 (two) times a day 4 caps BID 4/11/22   Historical Provider, MD   docosanol (ABREVA) 10 % Apply topically 5 (five) times a day Apply and rub in 5x a day until healed as needed for lesion 10/2/18   Usha Florence MD   ferrous sulfate 324 (65 Fe) mg Take 1 tablet by mouth 2 (two) times a day    Historical Provider, MD   fexofenadine (ALLEGRA) 30 MG/5ML suspension 30 mL (180 mg total) by Per G Tube route daily as needed (seasonal allergies) 8/18/24   Aden Mcgregor MD   fluticasone (FLONASE) 50 mcg/act nasal spray 1 spray into each nostril daily as needed for rhinitis or allergies 4/7/20   Chely Ny MD   glimepiride (AMARYL) 1 mg tablet 1 tablet (1 mg total) by Per G Tube route daily with breakfast 8/18/24   Aden Mcgregor MD   guaiFENesin (DIABETIC TUSSIN EX) 100 MG/5ML oral liquid 10 mL  (200 mg total) by Per G Tube route 4 (four) times a day as needed for cough 8/18/24   Aden Mcgregor MD   hydrocortisone 1 % cream Apply topically to affected area twice daily as needed for rash 6/11/20   Chely Ny MD   levothyroxine 25 mcg tablet 1 tablet (25 mcg total) by Per G Tube route every morning TAKE 1 TABLET BY MOUTH EVERY MORNING (HYPOTHYROIDISM) 8/18/24   Aden Mcgregor MD   LORazepam (ATIVAN) 0.5 mg tablet Take 0.5 mg by mouth daily Every day at 8pm    Historical Provider, MD   metFORMIN (GLUCOPHAGE) 1000 MG tablet 1 tablet (1,000 mg total) by Per G Tube route 2 (two) times a day with meals 8/18/24   Aden Mcgregor MD   methenamine hippurate (HIPREX) 1 g tablet Crush 1 tablet two times a day with meals 7/19/24   LEATHA Wright   neomycin-bacitracin-polymyxin b (NEOSPORIN) ointment Apply 1 application topically 2 (two) times a day as needed Apply to affected area BID PRN    Historical Provider, MD   olopatadine HCl (PATADAY) 0.2 % opth drops Administer 1 drop to both eyes as needed Instill 1 drop into affected eyes daily PRN for eye redness    Historical Provider, MD   omeprazole (PriLOSEC) 2 mg/mL oral suspension 10 mL (20 mg total) by Per G Tube route 2 (two) times a day before meals 8/13/24 2/9/25  Corrina Leos DO   oxybutynin (DITROPAN) 5 mg tablet 1 tablet (5 mg total) by Per G Tube route 3 (three) times a day 8/18/24   Aden Mcgregor MD   polyethylene glycol (GLYCOLAX) 17 GM/SCOOP powder 17 g by Per G Tube route daily 8/18/24   Aden Mcgregor MD   Refresh Liquigel 1 % GEL 2 (two) times a day 1 drop Bid bilaterally 8/13/23   Historical Provider, MD   simvastatin (ZOCOR) 20 mg tablet Take 1 tablet (20 mg total) by mouth daily at bedtime 8/18/24   Aden Mcgregor MD   Starch, Thickening, POWD Take by mouth daily Use on food and liquid as directed 11/17/20   Sofie Frank DO   traZODone (DESYREL) 100 mg tablet 1 tablet (100 mg  total) by Per G Tube route daily at bedtime Take 1 tablet (100 mg total) by mouth daily at  8 pm for depression 8/18/24   Aden Mcgregor MD   Vitamins A & D (VITAMIN A & D) ointment Apply topically as needed for skin irritation    Historical Provider, MD     I have reviewed home medications with a medical source (PCP, Pharmacy, other).    Allergies:   Allergies   Allergen Reactions    Keflex [Cephalexin] GI Intolerance       Social History:     Marital Status: Single   Occupation: Reviewed and Non Contributory   Patient Pre-hospital Living Situation: Personal Care Home   Patient Pre-hospital Level of Mobility: Walks   Patient Pre-hospital Diet Restrictions: None     Substance Use History:   Social History     Substance and Sexual Activity   Alcohol Use Not Currently     Social History     Tobacco Use   Smoking Status Never   Smokeless Tobacco Never     Social History     Substance and Sexual Activity   Drug Use No       Family History:    Family History   Problem Relation Age of Onset    Alcohol abuse Mother     Alcohol abuse Father     Diabetes Sister     No Known Problems Sister     No Known Problems Sister        Physical Exam:     Vitals:   Blood Pressure: 102/63 (08/20/24 1445)  Pulse: 85 (08/20/24 1445)  Temperature: (!) 100.7 °F (38.2 °C) (08/20/24 1536)  Temp Source: Rectal (08/20/24 1536)  Respirations: 18 (08/20/24 1445)  SpO2: 95 % (08/20/24 1445)    Physical Exam  General Exam: Very simple commands like opening her hands more with the perspiration by her caregiver, does not open eyes to commands.  Nonverbal, does not look to be in any acute distress.    Eyes: FAREED  Neck: Supple.   CVS: S1, S2 Drew, tachycardic but regular rhythm.  R/S: Clear to auscultate anteriorly.  Faint crackles heard bibasilar however poor inspiratory effort.  Difficult to auscultate secondary to poor inspiratory effort.  Abd: Soft, NT, ND, BS+ve  Extremities: No edema noted.   Skin: No acute Rash noted.   CNS: Functional  "quadriplegia noted, bilateral upper and lower extremity spasticity noted.  Tardive dyskinesia noted on face exam features.   Psych: Co-operative, Not agitated.   Lethargic and drowsy, winces to pain stimuli, follow        Lab Results: I have personally reviewed pertinent reports.    Results from last 7 days   Lab Units 08/20/24  1239   WBC Thousand/uL 4.31   HEMOGLOBIN g/dL 11.9   HEMATOCRIT % 36.3   PLATELETS Thousands/uL 106*     Results from last 7 days   Lab Units 08/20/24  1239   POTASSIUM mmol/L 4.8   CHLORIDE mmol/L 99   CO2 mmol/L 27   BUN mg/dL 26*   CREATININE mg/dL 1.04   CALCIUM mg/dL 10.8*   ALK PHOS U/L 85   ALT U/L 33   AST U/L 57*     Results from last 7 days   Lab Units 08/20/24  1349   INR  0.94         Results from last 7 days   Lab Units 08/20/24  1425 08/20/24  1239   LACTIC ACID mmol/L 2.8* 3.9*   PROCALCITONIN ng/ml  --  1.80*       Imaging: I have personally reviewed pertinent reports.      XR chest 1 view portable   ED Interpretation by Jarrett Woody PA-C (08/20 1517)   No acute cardiopulmonary process.          XR chest 1 view portable   ED Interpretation   No acute cardiopulmonary process.          EKG, Imaging, and Other Studies Reviewed on Admission:   Sinus tachycardia noted on telemetry monitor.    Discussed the case with ED Provider  Discussed the case with n/a   [facility] Yes     ** Please Note: \"This note has been constructed using a voice recognition system.Therefore there may be syntax, spelling, and/or grammatical errors. Please call if you have any questions. \"**    "

## 2024-08-20 NOTE — ED PROVIDER NOTES
History  Chief Complaint   Patient presents with    Fever     As per caregiver pt had chills today, lethargic, fevers, also had a med error yesterday received extra meds     64 year old female presenting today with concerns of fever, lethargyl, weakness since this morning.  Patient is nonverbal at baseline, history of cerebral palsy, developmental delay and intellectual disability, diabetes type 2.  Patient per staff was not complaining of any pain but just felt more weak and lethargic than normal and did have a fever.  There was a medication error yesterday, she was given twice the amount of Depakote in the morning, 24 hours ago.  No respiratory distress noted by staff.        Prior to Admission Medications   Prescriptions Last Dose Informant Patient Reported? Taking?   ARIPiprazole (ABILIFY) 2 mg tablet  Care Giver Yes Yes   Si tablet by Per G Tube route daily at bedtime   Ascorbic Acid (vitamin C) 1000 MG tablet   No Yes   Sig: Crush 1 tablet daily   LORazepam (ATIVAN) 0.5 mg tablet  Care Giver Yes Yes   Sig: Take 0.5 mg by mouth daily Every day at 8pm   Refresh Liquigel 1 % GEL  Care Giver Yes No   Si (two) times a day 1 drop Bid bilaterally   Starch, Thickening, POWD  Care Giver No No   Sig: Take by mouth daily Use on food and liquid as directed   Vitamins A & D (VITAMIN A & D) ointment  Care Giver Yes No   Sig: Apply topically as needed for skin irritation   baclofen 10 mg tablet   No Yes   Si tablet (10 mg total) by Per G Tube route 3 (three) times a day   benzonatate (TESSALON PERLES) 100 mg capsule  Care Giver No Yes   Sig: TAKE 1 CAPSULE BY MOUTH EVERY 8 HOURS AS NEEDED FOR COUGH **DO NOT CRUSH**   calcium carbonate (OS-MORRIS) 600 MG tablet   No Yes   Si tablet (600 mg total) by Per G Tube route 2 (two) times a day with meals   carbamide peroxide (DEBROX) 6.5 % otic solution  Care Giver Yes No   Si drops 2 drops in the affected ear prn x 5 days   carbidopa-levodopa (SINEMET CR)  mg  TBCR per ER tablet  Care Giver No No   Sig: TAKE 2 TABLETS BY MOUTH (50/200MG) TWICE A DAY (PARKINSONS DISEASE)   cholecalciferol (VITAMIN D3) 1,000 units tablet   No No   Si tablet (1,000 Units total) by Per G Tube route daily   citalopram (CeleXA) 40 mg tablet  Care Giver Yes Yes   Si tablet by Per G Tube route daily   denosumab (Prolia) 60 mg/mL  Care Giver No Yes   Sig: Inject 1 mL (60 mg total) under the skin once for 1 dose To be administered on 24   divalproex sodium (DEPAKOTE SPRINKLE) 125 MG capsule  Care Giver Yes Yes   Sig: 3 capsules by Per G Tube route 2 (two) times a day 4 caps BID   docosanol (ABREVA) 10 %  Care Giver No No   Sig: Apply topically 5 (five) times a day Apply and rub in 5x a day until healed as needed for lesion   ferrous sulfate 324 (65 Fe) mg  Care Giver Yes No   Sig: Take 1 tablet by mouth 2 (two) times a day   fexofenadine (ALLEGRA) 30 MG/5ML suspension   No No   Si mL (180 mg total) by Per G Tube route daily as needed (seasonal allergies)   fluticasone (FLONASE) 50 mcg/act nasal spray  Care Giver No No   Si spray into each nostril daily as needed for rhinitis or allergies   glimepiride (AMARYL) 1 mg tablet   No No   Si tablet (1 mg total) by Per G Tube route daily with breakfast   guaiFENesin (DIABETIC TUSSIN EX) 100 MG/5ML oral liquid   No No   Sig: 10 mL (200 mg total) by Per G Tube route 4 (four) times a day as needed for cough   hydrocortisone 1 % cream  Care Giver No No   Sig: Apply topically to affected area twice daily as needed for rash   levothyroxine 25 mcg tablet   No No   Si tablet (25 mcg total) by Per G Tube route every morning TAKE 1 TABLET BY MOUTH EVERY MORNING (HYPOTHYROIDISM)   metFORMIN (GLUCOPHAGE) 1000 MG tablet   No No   Si tablet (1,000 mg total) by Per G Tube route 2 (two) times a day with meals   methenamine hippurate (HIPREX) 1 g tablet   No No   Sig: Crush 1 tablet two times a day with meals   neomycin-bacitracin-polymyxin  b (NEOSPORIN) ointment  Care Giver Yes No   Sig: Apply 1 application topically 2 (two) times a day as needed Apply to affected area BID PRN   olopatadine HCl (PATADAY) 0.2 % opth drops  Care Giver Yes No   Sig: Administer 1 drop to both eyes as needed Instill 1 drop into affected eyes daily PRN for eye redness   omeprazole (PriLOSEC) 2 mg/mL oral suspension   No Yes   Sig: 10 mL (20 mg total) by Per G Tube route 2 (two) times a day before meals   oxybutynin (DITROPAN) 5 mg tablet   No Yes   Si tablet (5 mg total) by Per G Tube route 3 (three) times a day   polyethylene glycol (GLYCOLAX) 17 GM/SCOOP powder   No No   Si g by Per G Tube route daily   simvastatin (ZOCOR) 20 mg tablet   No Yes   Sig: Take 1 tablet (20 mg total) by mouth daily at bedtime   traZODone (DESYREL) 100 mg tablet   No No   Si tablet (100 mg total) by Per G Tube route daily at bedtime Take 1 tablet (100 mg total) by mouth daily at  8 pm for depression      Facility-Administered Medications: None       Past Medical History:   Diagnosis Date    Acute metabolic encephalopathy 2015    Adjustment disorder     Altered mental status 2015    Anemia     Bipolar 1 disorder ([facility])     Cerebral palsy ([facility])     Chronic hypernatremia 2016    Closed fracture of left hip ([facility]) 2016    Closed left hip fracture ([facility])     no surgery    Constipation     Dehydration 2016    Developmental delay     Diabetes mellitus ([facility])     Difficulty walking     Disease of thyroid gland     Diverticulosis     Dysphagia     Worsening    Fracture of multiple ribs of right side     Herpes zoster without complication 2021    Hip fracture ([facility]) 2015    left    Hyperlipidemia     Hypernatremia 2018    Hypotension     Impulse control disorder     Incontinence     Intellectual disability due to developmental disorder, unspecified     Microalbuminuria     Neuropathy in diabetes ([facility])     Osteopathia     Osteoporosis     Peripheral  neuropathy     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[facility])     Thrombocytopenia ([facility]) 07/31/2015       Past Surgical History:   Procedure Laterality Date    ABDOMINAL SURGERY      COLECTOMY MIN      re-anastomosis 7/22/16    COLON SURGERY  1/31/16    COLONOSCOPY N/A 07/21/2016    Procedure: COLONOSCOPY;  Surgeon: Rosalino Jacome MD;  Location: [facility];  Service:     COLONOSCOPY N/A 01/31/2016    Procedure: COLONOSCOPY;  Surgeon: Rosalino Jacome MD;  Location[facility];  Service:     COLONOSCOPY N/A 07/25/2017    Procedure: COLONOSCOPY;  Surgeon: Rosalino Jacome MD;  Location: [facility];  Service: Colorectal    COLOSTOMY      EXPLORATORY LAPAROTOMY W/ BOWEL RESECTION N/A 01/31/2016    Procedure: exploratory laparotomy, left sigmoidectomy, coloproctostomy, take down splenic flexure, loop colostomy;  Surgeon: Rosalino Jacome MD;  Location: [facility];  Service:     [place] CLOSURE ENTEROSTOMY LG/SMALL INTESTINE N/A 07/22/2016    Procedure: SEGMENTAL COLECTOMY WITH COLOCOLOSTOMY;  Surgeon: Rosalino Jacome MD;  Location: [facility];  Service: Colorectal    UPPER GASTROINTESTINAL ENDOSCOPY         Family History   Problem Relation Age of Onset    Alcohol abuse Mother     Alcohol abuse Father     Diabetes Sister     No Known Problems Sister     No Known Problems Sister      I have reviewed and agree with the history as documented.    E-Cigarette/Vaping    E-Cigarette Use Never User      E-Cigarette/Vaping Substances    Nicotine No     THC No     CBD No     Flavoring No     Other No     Unknown No      Social History     Tobacco Use    Smoking status: Never    Smokeless tobacco: Never   Vaping Use    Vaping status: Never Used   Substance Use Topics    Alcohol use: Not Currently    Drug use: No       Review of Systems   Unable to perform ROS: Patient nonverbal   Constitutional:  Negative for chills and fever.   Gastrointestinal:  Negative for vomiting.   Skin:  Negative for color change and rash.   Neurological:  Positive for  weakness.   All other systems reviewed and are negative.      Physical Exam  Physical Exam  Vitals and nursing note reviewed.   Constitutional:       General: She is not in acute distress.     Appearance: She is well-developed. She is ill-appearing.   HENT:      Head: Normocephalic and atraumatic.   Eyes:      Conjunctiva/sclera: Conjunctivae normal.   Cardiovascular:      Rate and Rhythm: Regular rhythm. Tachycardia present.      Pulses: Normal pulses.      Heart sounds: Normal heart sounds. No murmur heard.  Pulmonary:      Effort: Pulmonary effort is normal. No respiratory distress.      Breath sounds: Normal breath sounds.   Abdominal:      Palpations: Abdomen is soft.      Tenderness: There is no abdominal tenderness.   Musculoskeletal:         General: No swelling.      Cervical back: Neck supple.      Right lower leg: No edema.      Left lower leg: No edema.   Skin:     General: Skin is warm and dry.      Capillary Refill: Capillary refill takes less than 2 seconds.      Coloration: Skin is not jaundiced or pale.      Findings: Erythema (portion between bilateral buttock skin folds, no ulcer. No wounds/) present. No bruising, lesion or rash.   Neurological:      Mental Status: She is alert.   Psychiatric:         Mood and Affect: Mood normal.         Vital Signs  ED Triage Vitals [08/20/24 1209]   Temperature Pulse Respirations Blood Pressure SpO2   (!) 102.8 °F (39.3 °C) (!) 112 17 133/69 91 %      Temp Source Heart Rate Source Patient Position - Orthostatic VS BP Location FiO2 (%)   Axillary Monitor Sitting Right arm --      Pain Score       --           Vitals:    08/20/24 1430 08/20/24 1445 08/20/24 1545 08/20/24 1700   BP: 97/57 102/63 101/66 98/59   Pulse: 84 85 83 77   Patient Position - Orthostatic VS: Sitting Lying           Visual Acuity      ED Medications  Medications   albuterol inhalation solution 2.5 mg (has no administration in time range)   acetaminophen (Ofirmev) IVPB 600 mg (0 mg  Intravenous Stopped 8/20/24 1351)   sodium chloride 0.9 % bolus 1,000 mL (0 mL Intravenous Stopped 8/20/24 1424)     Followed by   sodium chloride 0.9 % bolus 1,000 mL (0 mL Intravenous Stopped 8/20/24 1424)   ceftriaxone (ROCEPHIN) 1 g/50 mL in dextrose IVPB (0 mg Intravenous Stopped 8/20/24 1424)       Diagnostic Studies  Results Reviewed       Procedure Component Value Units Date/Time    Blood culture #1 [[id number]] Collected: 08/20/24 1239    Lab Status: Preliminary result Specimen: Blood from Arm, Left Updated: 08/20/24 2001     Blood Culture Received in Microbiology Lab. Culture in Progress.    Blood culture #2 [[id number]] Collected: 08/20/24 1239    Lab Status: Preliminary result Specimen: Blood from Arm, Right Updated: 08/20/24 2001     Blood Culture Received in Microbiology Lab. Culture in Progress.    Lactic acid 2 Hours [[id number]]  (Abnormal) Collected: 08/20/24 1727    Lab Status: Final result Specimen: Blood from Hand, Right Updated: 08/20/24 1756     LACTIC ACID 2.3 mmol/L     Narrative:      Result may be elevated if tourniquet was used during collection.    Ammonia [[id number]]  (Normal) Collected: 08/20/24 1727    Lab Status: Final result Specimen: Blood from Hand, Right Updated: 08/20/24 1756     Ammonia 31 umol/L     Blood gas, venous [[id number]]  (Abnormal) Collected: 08/20/24 1727    Lab Status: Final result Specimen: Blood from Hand, Right Updated: 08/20/24 1737     pH, Chris 7.405     pCO2, Chris 48.4 mm Hg      pO2, Chris 49.8 mm Hg      HCO3, Chris 29.6 mmol/L      Base Excess, Chris 4.2 mmol/L      O2 Content, Chris 12.5 ml/dL      O2 HGB, VENOUS 83.3 %     Lactic acid 2 Hours [[id number]]  (Abnormal) Collected: 08/20/24 1425    Lab Status: Final result Specimen: Blood from Arm, Right Updated: 08/20/24 1448     LACTIC ACID 2.8 mmol/L     Narrative:      Result may be elevated if tourniquet was used during collection.    Procalcitonin [[id number]]  (Abnormal) Collected: 08/20/24 1239    Lab Status: Final  result Specimen: Blood from Arm, Left Updated: 08/20/24 1440     Procalcitonin 1.80 ng/ml     FLU/RSV/COVID - if FLU/RSV clinically relevant [[id number]]  (Normal) Collected: 08/20/24 1239    Lab Status: Final result Specimen: Nares from Nose Updated: 08/20/24 1415     SARS-CoV-2 Negative     INFLUENZA A PCR Negative     INFLUENZA B PCR Negative     RSV PCR Negative    Narrative:      This test has been performed using the CoV-2/Flu/RSV plus assay on the [employer] platform. This test has been validated by the  and verified by the performing laboratory.     This test is designed to amplify and detect the following: nucleocapsid (N), envelope (E), and RNA-dependent RNA polymerase (RdRP) genes of the SARS-CoV-2 genome; matrix (M), basic polymerase (PB2), and acidic protein (PA) segments of the influenza A genome; matrix (M) and non-structural protein (NS) segments of the influenza B genome, and the nucleocapsid genes of RSV A and RSV B.     Positive results are indicative of the presence of Flu A, Flu B, RSV, and/or SARS-CoV-2 RNA. Positive results for SARS-CoV-2 or suspected novel influenza should be reported to state, local, or federal health departments according to local reporting requirements.      All results should be assessed in conjunction with clinical presentation and other laboratory markers for clinical management.     FOR PEDIATRIC PATIENTS - copy/paste COVID Guidelines URL to browser: [email or web address]       Protime-INR [[id number]]  (Normal) Collected: 08/20/24 1349    Lab Status: Final result Specimen: Blood from Arm, Right Updated: 08/20/24 1414     Protime 13.3 seconds      INR 0.94    Narrative:      INR Therapeutic Range    Indication                                             INR Range      Atrial Fibrillation                                               2.0-3.0  Hypercoagulable State                                     2.0.2.3  Left Ventricular Asist Device                            2.0-3.0  Mechanical Heart Valve                                  -    Aortic(with afib, MI, embolism, HF, LA enlargement,    and/or coagulopathy)                                     2.0-3.0 (2.5-3.5)     Mitral                                                             2.5-3.5  Prosthetic/Bioprosthetic Heart Valve               2.0-3.0  Venous thromboembolism (VTE: VT, PE        2.0-3.0    APTT [[id number]]  (Normal) Collected: 08/20/24 1349    Lab Status: Final result Specimen: Blood from Arm, Right Updated: 08/20/24 1414     PTT 25 seconds     Comprehensive metabolic panel [[id number]]  (Abnormal) Collected: 08/20/24 1239    Lab Status: Final result Specimen: Blood from Arm, Left Updated: 08/20/24 1349     Sodium 137 mmol/L      Potassium 4.8 mmol/L      Chloride 99 mmol/L      CO2 27 mmol/L      ANION GAP 11 mmol/L      BUN 26 mg/dL      Creatinine 1.04 mg/dL      Glucose 168 mg/dL      Calcium 10.8 mg/dL      AST 57 U/L      ALT 33 U/L      Alkaline Phosphatase 85 U/L      Total Protein 7.4 g/dL      Albumin 3.9 g/dL      Total Bilirubin 0.22 mg/dL      eGFR 57 ml/min/1.73sq m     Narrative:      National Kid[employer] Foundation guidelines for Chronic Kidney Disease (CKD):     Stage 1 with normal or high GFR (GFR > 90 mL/min/1.73 square meters)    Stage 2 Mild CKD (GFR = 60-89 mL/min/1.73 square meters)    Stage 3A Moderate CKD (GFR = 45-59 mL/min/1.73 square meters)    Stage 3B Moderate CKD (GFR = 30-44 mL/min/1.73 square meters)    Stage 4 Severe CKD (GFR = 15-29 mL/min/1.73 square meters)    Stage 5 End Stage CKD (GFR <15 mL/min/1.73 square meters)  Note: GFR calculation is accurate only with a steady state creatinine    Lactic acid [[id number]]  (Abnormal) Collected: 08/20/24 1239    Lab Status: Final result Specimen: Blood from Arm, Left Updated: 08/20/24 1326     LACTIC ACID 3.9 mmol/L     Narrative:      Result may be elevated if tourniquet  was used during collection.    Urine Microscopic [[id number]]  (Abnormal) Collected: 08/20/24 1303    Lab Status: Final result Specimen: Urine, Straight Cath Updated: 08/20/24 1326     RBC, UA 30-50 /hpf      WBC, UA Innumerable /hpf      Epithelial Cells None Seen /hpf      Bacteria, UA Moderate /hpf      WBC Clumps Present    Urine culture [[id number]] Collected: 08/20/24 1303    Lab Status: In process Specimen: Urine, Straight Cath Updated: 08/20/24 1326    UA w Reflex to Microscopic w Reflex to Culture [[id number]]  (Abnormal) Collected: 08/20/24 1303    Lab Status: Final result Specimen: Urine, Straight Cath Updated: 08/20/24 1320     Color, UA Yellow     Clarity, UA Extra Turbid     Specific Gravity, UA 1.016     pH, UA 6.5     Leukocytes, UA Large     Nitrite, UA Negative     Protein, UA 30 (1+) mg/dl      Glucose, UA Negative mg/dl      Ketones, UA 10 (1+) mg/dl      Urobilinogen, UA <2.0 mg/dl      Bilirubin, UA Negative     Occult Blood, UA Small    CBC and differential [[id number]]  (Abnormal) Collected: 08/20/24 1239    Lab Status: Final result Specimen: Blood from Arm, Left Updated: 08/20/24 1319     WBC 4.31 Thousand/uL      RBC 3.63 Million/uL      Hemoglobin 11.9 g/dL      Hematocrit 36.3 %       fL      MCH 32.8 pg      MCHC 32.8 g/dL      RDW 13.2 %      MPV 12.0 fL      Platelets 106 Thousands/uL      nRBC 0 /100 WBCs      Segmented % 76 %      Immature Grans % 0 %      Lymphocytes % 15 %      Monocytes % 7 %      Eosinophils Relative 1 %      Basophils Relative 1 %      Absolute Neutrophils 3.30 Thousands/µL      Absolute Immature Grans 0.01 Thousand/uL      Absolute Lymphocytes 0.63 Thousands/µL      Absolute Monocytes 0.30 Thousand/µL      Eosinophils Absolute 0.04 Thousand/µL      Basophils Absolute 0.03 Thousands/µL                    XR chest 1 view portable   ED Interpretation by Jarrett Woody PA-C (08/20 1517)   No acute cardiopulmonary process.                 Procedures  ECG 12  Lead Documentation Only    Date/Time: 8/20/2024 9:23 PM    Performed by: Jarrett Woody PA-C  Authorized by: Jarrett Woody PA-C    Indications / Diagnosis:  Sepsis  ECG reviewed by me, the ED Provider: yes    Patient location:  ED  Previous ECG:     Previous ECG:  Compared to current    Similarity:  Changes noted    Comparison to cardiac monitor: Yes    Interpretation:     Interpretation: abnormal    Rate:     ECG rate:  110    ECG rate assessment: tachycardic    Rhythm:     Rhythm: sinus rhythm    Ectopy:     Ectopy: none    QRS:     QRS axis:  Normal    QRS intervals:  Normal  Conduction:     Conduction: normal    ST segments:     ST segments:  Normal  T waves:     T waves: normal             ED Course  ED Course as of 08/20/24 2124   Tue Aug 20, 2024   1321 Leukocytes, UA(!): Large   1321 Blood, UA(!): Small   1339 LACTIC ACID(!): 3.9  trend   1418 PTT: 25   1418 PROTIME: 13.3   1418 POCT INR: 0.94   1418 SARS-COV-2: Negative   1418 INFLU A PCR: Negative   1418 INFLU B PCR: Negative   1418 RSV PCR: Negative                              Initial Sepsis Screening       Row Name 08/20/24 1528 08/20/24 1355             Is the patient's history suggestive of a new or worsening infection? Yes (Proceed)  -VL Yes (Proceed)  -ST       Suspected source of infection urinary tract infection  -VL pneumonia;urinary tract infection  -ST       Indicate SIRS criteria Tachycardia > 90 bpm;Hyperthemia > 38.3C (100.9F) OR Hypothermia <36C (96.8F)  -VL Hyperthemia > 38.3C (100.9F) OR Hypothermia <36C (96.8F);Tachycardia > 90 bpm  -ST       Are two or more of the above signs & symptoms of infection both present and new to the patient? Yes (Proceed)  -VL Yes (Proceed)  -ST       Assess for evidence of organ dysfunction: Are any of the below criteria present within 6 hours of suspected infection and SIRS criteria that are NOT considered to be chronic conditions? Lactate > 2.0  -VL Lactate > 2.0  -ST       Date of presentation of  "severe sepsis 08/20/24  -VL 08/20/24  -ST       Time of presentation of severe sepsis -- 1355  -ST       Date of presentation of septic shock -- --       Time of presentation of septic shock -- --       Fluid Resuscitation: -- --       Sepsis Note: Click \"NEXT\" below (NOT \"close\") to generate sepsis note based on above information. -- YES (proceed by clicking \"NEXT\")  -ST                 User Key  (r) = Recorded By, (t) = Taken By, (c) = Cosigned By      Initials Name Provider Type     Jennifer Hernandez MD Physician    ST Jarrett Woody PA-C Physician Assistant                  Default Flowsheet Data (Last 720 Hours)       Sepsis Reassess       Row Name 08/20/24 1356                   Repeat Volume Status and Tissue Perfusion Assessment Performed    Date of Reassessment: 08/20/24  -ST        Time of Reassessment: 1356  -ST        Sepsis Reassessment Note: Click \"NEXT\" below (NOT \"close\") to generate sepsis reassessment note. YES (proceed by clicking \"NEXT\")  -ST        Repeat Volume Status and Tissue Perfusion Assessment Performed --                  User Key  (r) = Recorded By, (t) = Taken By, (c) = Cosigned By      Initials Name Provider Type    ST Jarrett Woody PA-C Physician Assistant                                Medical Decision Making  64-year-old female, tachycardic, febrile, sepsis.  Patient nonverbal at baseline.  Sepsis workup.  Lab workup revealing UTI, lactic acidosis, will trend.  Patient given IV Tylenol, sepsis documentation filed.  Patient did have improvement of heart rate, blood pressure remained around baseline.  Chest x-ray reviewed by me, patient above documentation.  EKG reviewed by me, please see above documentation.  Patient was given empiric antibiotics.  COVID flu RSV negative.  Lactate did trend down, still abnormal, trend further.  Discussed with inpatient team, will admit for severe sepsis secondary to urinary tract infection as cause.  Patient at time of admission was " stable, albeit ill.    Amount and/or Complexity of Data Reviewed  Labs: ordered. Decision-making details documented in ED Course.  Radiology: ordered and independent interpretation performed.    Risk  Decision regarding hospitalization.                 Disposition  Final diagnoses:   Sepsis (HCC)   Urinary tract infection   Lactic acidosis     Time reflects when diagnosis was documented in both MDM as applicable and the Disposition within this note       Time User Action Codes Description Comment    8/20/2024  2:46 PM Jarrett Woody [A41.9] Sepsis (HCC)     8/20/2024  2:47 PM Jarrett Woody [N39.0] Urinary tract infection     8/20/2024  9:21 PM Jarrett Woody [E87.20] Lactic acidosis           ED Disposition       ED Disposition   Admit    Condition   Stable    Date/Time   Tue Aug 20, 2024  2:47 PM    Comment   Case was discussed with Dr Cosme and the patient's admission status was agreed to be Admission Status: inpatient status to the service of Dr. cosme .               Follow-up Information    None         Current Discharge Medication List        CONTINUE these medications which have NOT CHANGED    Details   ARIPiprazole (ABILIFY) 2 mg tablet 1 tablet by Per G Tube route daily at bedtime      Ascorbic Acid (vitamin C) 1000 MG tablet Crush 1 tablet daily  Qty: 30 tablet, Refills: 3    Associated Diagnoses: Recurrent UTI      baclofen 10 mg tablet 1 tablet (10 mg total) by Per G Tube route 3 (three) times a day  Qty: 84 tablet, Refills: 0    Associated Diagnoses: Spasticity as late effect of cerebrovascular accident (CVA)      benzonatate (TESSALON PERLES) 100 mg capsule TAKE 1 CAPSULE BY MOUTH EVERY 8 HOURS AS NEEDED FOR COUGH **DO NOT CRUSH**  Qty: 5 capsule, Refills: 11    Associated Diagnoses: Cough      calcium carbonate (OS-MORRIS) 600 MG tablet 1 tablet (600 mg total) by Per G Tube route 2 (two) times a day with meals  Qty: 60 tablet, Refills: 0    Associated Diagnoses: Osteoporosis,  unspecified osteoporosis type, unspecified pathological fracture presence      citalopram (CeleXA) 40 mg tablet 1 tablet by Per G Tube route daily      denosumab (Prolia) 60 mg/mL Inject 1 mL (60 mg total) under the skin once for 1 dose To be administered on 7/2/24  Qty: 1 mL, Refills: 0    Associated Diagnoses: Osteoporosis, unspecified osteoporosis type, unspecified pathological fracture presence      divalproex sodium (DEPAKOTE SPRINKLE) 125 MG capsule 3 capsules by Per G Tube route 2 (two) times a day 4 caps BID      LORazepam (ATIVAN) 0.5 mg tablet Take 0.5 mg by mouth daily Every day at 8pm      omeprazole (PriLOSEC) 2 mg/mL oral suspension 10 mL (20 mg total) by Per G Tube route 2 (two) times a day before meals  Qty: 900 mL, Refills: 1    Associated Diagnoses: Gastroesophageal reflux disease, unspecified whether esophagitis present      oxybutynin (DITROPAN) 5 mg tablet 1 tablet (5 mg total) by Per G Tube route 3 (three) times a day  Qty: 84 tablet, Refills: 0    Associated Diagnoses: Urinary incontinence, unspecified type      simvastatin (ZOCOR) 20 mg tablet Take 1 tablet (20 mg total) by mouth daily at bedtime  Qty: 28 tablet, Refills: 0    Associated Diagnoses: Hyperlipidemia, unspecified hyperlipidemia type      carbamide peroxide (DEBROX) 6.5 % otic solution 2 drops 2 drops in the affected ear prn x 5 days      carbidopa-levodopa (SINEMET CR)  mg TBCR per ER tablet TAKE 2 TABLETS BY MOUTH (50/200MG) TWICE A DAY (PARKINSONS DISEASE)  Qty: 112 tablet, Refills: 10    Associated Diagnoses: Parkinsonism, unspecified Parkinsonism type      cholecalciferol (VITAMIN D3) 1,000 units tablet 1 tablet (1,000 Units total) by Per G Tube route daily  Qty: 90 tablet, Refills: 0    Associated Diagnoses: Osteoporosis      docosanol (ABREVA) 10 % Apply topically 5 (five) times a day Apply and rub in 5x a day until healed as needed for lesion  Refills: 0    Associated Diagnoses: Herpes labialis      ferrous sulfate  324 (65 Fe) mg Take 1 tablet by mouth 2 (two) times a day      fexofenadine (ALLEGRA) 30 MG/5ML suspension 30 mL (180 mg total) by Per G Tube route daily as needed (seasonal allergies)  Qty: 237 mL, Refills: 0    Associated Diagnoses: Seasonal allergies      fluticasone (FLONASE) 50 mcg/act nasal spray 1 spray into each nostril daily as needed for rhinitis or allergies  Qty: 1 Bottle, Refills: 1    Associated Diagnoses: Seasonal allergies      glimepiride (AMARYL) 1 mg tablet 1 tablet (1 mg total) by Per G Tube route daily with breakfast  Qty: 28 tablet, Refills: 0    Associated Diagnoses: Type 2 diabetes mellitus with other specified complication, without long-term current use of insulin ([facility])      guaiFENesin (DIABETIC TUSSIN EX) 100 MG/5ML oral liquid 10 mL (200 mg total) by Per G Tube route 4 (four) times a day as needed for cough  Qty: 118 mL, Refills: 0    Associated Diagnoses: Viral illness      hydrocortisone 1 % cream Apply topically to affected area twice daily as needed for rash  Qty: 30 g, Refills: 0    Associated Diagnoses: Skin irritation      levothyroxine 25 mcg tablet 1 tablet (25 mcg total) by Per G Tube route every morning TAKE 1 TABLET BY MOUTH EVERY MORNING (HYPOTHYROIDISM)  Qty: 28 tablet, Refills: 0    Associated Diagnoses: Hypothyroidism, unspecified type      metFORMIN (GLUCOPHAGE) 1000 MG tablet 1 tablet (1,000 mg total) by Per G Tube route 2 (two) times a day with meals  Qty: 56 tablet, Refills: 0    Associated Diagnoses: Type 2 diabetes mellitus without complication, without long-term current use of insulin ([facility])      methenamine hippurate (HIPREX) 1 g tablet Crush 1 tablet two times a day with meals  Qty: 60 tablet, Refills: 3    Associated Diagnoses: Recurrent UTI      neomycin-bacitracin-polymyxin b (NEOSPORIN) ointment Apply 1 application topically 2 (two) times a day as needed Apply to affected area BID PRN      olopatadine HCl (PATADAY) 0.2 % opth drops Administer 1 drop to both  eyes as needed Instill 1 drop into affected eyes daily PRN for eye redness      polyethylene glycol (GLYCOLAX) 17 GM/SCOOP powder 17 g by Per G Tube route daily  Qty: 510 g, Refills: 0    Associated Diagnoses: Constipation, unspecified constipation type      Refresh Liquigel 1 % GEL 2 (two) times a day 1 drop Bid bilaterally      Starch, Thickening, POWD Take by mouth daily Use on food and liquid as directed  Qty: 1020 g, Refills: 3    Associated Diagnoses: Oropharyngeal dysphagia      traZODone (DESYREL) 100 mg tablet 1 tablet (100 mg total) by Per G Tube route daily at bedtime Take 1 tablet (100 mg total) by mouth daily at  8 pm for depression  Qty: 90 tablet, Refills: 0    Associated Diagnoses: Bipolar affective disorder, remission status unspecified ([facility])      Vitamins A & D (VITAMIN A & D) ointment Apply topically as needed for skin irritation             No discharge procedures on file.    PDMP Review         Value Time User    PDMP Reviewed  Yes 9/1/2020  3:35 PM Edward Pickering MD            ED Provider  Electronically Signed by             Jarrett Woody PA-C  08/20/24 2106

## 2024-08-20 NOTE — ASSESSMENT & PLAN NOTE
"Lab Results   Component Value Date    HGBA1C 6.2 (H) 08/13/2024       No results for input(s): \"POCGLU\" in the last 72 hours.    Blood Sugar Average: Last 72 hrs:  Carbohydrate diet. -Puréed honey thick liquid, avoid concentrated sweets.  Will start patient on low-dose sliding scale insulin with Accu-Chek 4 times daily.  "

## 2024-08-20 NOTE — ASSESSMENT & PLAN NOTE
We will hold patient's Depakote dose for now until Depakote level is back and patient mentally more awake.  Check ammonia level.

## 2024-08-20 NOTE — LETTER
[facility] SURGICAL  [address] [place] [address]  Dept: [phone]    August 23, 2024     Patient: Terrie Alicea   YOB: 1960   Date of Visit: 8/20/2024       To Whom it May Concern:    Terrie Alicea is under my professional care. She was seen in the hospital from 8/20/2024 to 08/23/24. She  may return to day program on Monday 8/26 without restriction .    If you have any questions or concerns, please don't hesitate to call.         Sincerely,            Aniket Whatley PA-C

## 2024-08-20 NOTE — ASSESSMENT & PLAN NOTE
Patient chronically has had dysphagia and was scheduled to get up PEG tube placed however was not able to achieve that by GI.  Hence supposed to see general surgery as an outpatient-Dr. Walker.   After patient's active issue resolved she can follow-up with general surgery as an outpatient.  In the meanwhile we will have the patient on dysphagia level 1 with honey thick liquid diet.  Consult speech therapist.

## 2024-08-20 NOTE — ASSESSMENT & PLAN NOTE
Patient chronically lives in a care home.  Bedbound and wheelchair-bound and dependent for all her ADLs.

## 2024-08-20 NOTE — SEPSIS NOTE
"  Sepsis Note   Terrie Alicea 64 y.o. female MRN: [id number]  Unit/Bed#: ED-31 Encounter: [id number]       Initial Sepsis Screening       Row Name 08/20/24 1528 08/20/24 1355             Is the patient's history suggestive of a new or worsening infection? Yes (Proceed)  -VL Yes (Proceed)  -ST       Suspected source of infection urinary tract infection  -VL pneumonia;urinary tract infection  -ST       Indicate SIRS criteria Tachycardia > 90 bpm;Hyperthemia > 38.3C (100.9F) OR Hypothermia <36C (96.8F)  -VL Hyperthemia > 38.3C (100.9F) OR Hypothermia <36C (96.8F);Tachycardia > 90 bpm  -ST       Are two or more of the above signs & symptoms of infection both present and new to the patient? Yes (Proceed)  -VL Yes (Proceed)  -ST       Assess for evidence of organ dysfunction: Are any of the below criteria present within 6 hours of suspected infection and SIRS criteria that are NOT considered to be chronic conditions? Lactate > 2.0  -VL Lactate > 2.0  -ST       Date of presentation of severe sepsis 08/20/24  -VL 08/20/24  -ST       Time of presentation of severe sepsis -- 1355  -ST       Date of presentation of septic shock -- --       Time of presentation of septic shock -- --       Fluid Resuscitation: -- --       Sepsis Note: Click \"NEXT\" below (NOT \"close\") to generate sepsis note based on above information. -- YES (proceed by clicking \"NEXT\")  -ST                 User Key  (r) = Recorded By, (t) = Taken By, (c) = Cosigned By      Initials Name Provider Type     Jennifer Hernandez MD Physician    ST Jarrett Woody PA-C Physician Assistant                    Default Flowsheet Data (Last 720 Hours)       Sepsis Reassess       Row Name 08/20/24 1356                   Repeat Volume Status and Tissue Perfusion Assessment Performed    Date of Reassessment: 08/20/24  -ST        Time of Reassessment: 1356  -ST        Sepsis Reassessment Note: Click \"NEXT\" below (NOT \"close\") to generate sepsis reassessment note. YES " "(proceed by clicking \"NEXT\")  -ST        Repeat Volume Status and Tissue Perfusion Assessment Performed --                  User Key  (r) = Recorded By, (t) = Taken By, (c) = Cosigned By      Initials Name Provider Type    ST Jarrett Woody PA-C Physician Assistant                    There is no height or weight on file to calculate BMI.  Wt Readings from Last 1 Encounters:   08/13/24 39.9 kg (88 lb)        Ideal body weight: 47.1 kg (103 lb 14.5 oz)    "

## 2024-08-20 NOTE — ASSESSMENT & PLAN NOTE
Most likely secondary to severe sepsis from UTI.  Chest x-ray officially not read but does not show any significant opacities.  Treat the patient with IV ceftriaxone, follow-up culture data.  Change patient's trazodone to 50 mg nightly as needed insomnia  Discontinue Ativan nightly.  Until patient mental status much better.  Discussed this with the patient's routine caregiver Lisa and she is in agreement with this plan of care.

## 2024-08-20 NOTE — SEPSIS NOTE
"  Sepsis Note   Terrie Alicea 64 y.o. female MRN: [id number]  Unit/Bed#: ED-31 Encounter: [id number]       Initial Sepsis Screening       Row Name 08/20/24 1355                Is the patient's history suggestive of a new or worsening infection? Yes (Proceed)  -ST        Suspected source of infection pneumonia;urinary tract infection  -ST        Indicate SIRS criteria Hyperthemia > 38.3C (100.9F) OR Hypothermia <36C (96.8F);Tachycardia > 90 bpm  -ST        Are two or more of the above signs & symptoms of infection both present and new to the patient? Yes (Proceed)  -ST        Assess for evidence of organ dysfunction: Are any of the below criteria present within 6 hours of suspected infection and SIRS criteria that are NOT considered to be chronic conditions? Lactate > 2.0  -ST        Date of presentation of severe sepsis 08/20/24  -ST        Time of presentation of severe sepsis 1355  -ST        Sepsis Note: Click \"NEXT\" below (NOT \"close\") to generate sepsis note based on above information. YES (proceed by clicking \"NEXT\")  -ST                  User Key  (r) = Recorded By, (t) = Taken By, (c) = Cosigned By      Initials Name Provider Type    ST Jarrett Woody PA-C Physician Assistant                        There is no height or weight on file to calculate BMI.  Wt Readings from Last 1 Encounters:   08/13/24 39.9 kg (88 lb)        Ideal body weight: 47.1 kg (103 lb 14.5 oz)    "

## 2024-08-20 NOTE — ASSESSMENT & PLAN NOTE
Most likely secondary to urinary tract infection.  We will admit the patient to medical surgical unit as inpatient.  Patient has severe sepsis, lactic acid 3.9, however patient chronically has low blood pressure sometimes running in the low 100s.  After aggressive IV fluid resuscitation 30 mL/kg IV bolus, patient's lactic acid is trending downwards, will continue to check lactic acid and trend.  Continue maintenance IV fluids with D5 half-normal saline at 75 cc an hour.

## 2024-08-20 NOTE — SEPSIS NOTE
"  Sepsis Note   Terrie Alicea 64 y.o. female MRN: [id number]  Unit/Bed#: ED-31 Encounter: [id number]       Initial Sepsis Screening       Row Name 08/20/24 1528 08/20/24 1355             Is the patient's history suggestive of a new or worsening infection? Yes (Proceed)  -VL Yes (Proceed)  -ST       Suspected source of infection urinary tract infection  -VL pneumonia;urinary tract infection  -ST       Indicate SIRS criteria Tachycardia > 90 bpm;Hyperthemia > 38.3C (100.9F) OR Hypothermia <36C (96.8F)  -VL Hyperthemia > 38.3C (100.9F) OR Hypothermia <36C (96.8F);Tachycardia > 90 bpm  -ST       Are two or more of the above signs & symptoms of infection both present and new to the patient? Yes (Proceed)  -VL Yes (Proceed)  -ST       Assess for evidence of organ dysfunction: Are any of the below criteria present within 6 hours of suspected infection and SIRS criteria that are NOT considered to be chronic conditions? Lactate > 2.0  -VL Lactate > 2.0  -ST       Date of presentation of severe sepsis 08/20/24  -VL 08/20/24  -ST       Time of presentation of severe sepsis -- 1355  -ST       Date of presentation of septic shock -- --       Time of presentation of septic shock -- --       Fluid Resuscitation: -- --       Sepsis Note: Click \"NEXT\" below (NOT \"close\") to generate sepsis note based on above information. -- YES (proceed by clicking \"NEXT\")  -ST                 User Key  (r) = Recorded By, (t) = Taken By, (c) = Cosigned By      Initials Name Provider Type     Jennifer Hernandez MD Physician    ST Jarrett Woody PA-C Physician Assistant                    Default Flowsheet Data (Last 720 Hours)       Sepsis Reassess       Row Name 08/20/24 1356                   Repeat Volume Status and Tissue Perfusion Assessment Performed    Date of Reassessment: 08/20/24  -ST        Time of Reassessment: 1356  -ST        Sepsis Reassessment Note: Click \"NEXT\" below (NOT \"close\") to generate sepsis reassessment note. YES " "(proceed by clicking \"NEXT\")  -ST        Repeat Volume Status and Tissue Perfusion Assessment Performed --                  User Key  (r) = Recorded By, (t) = Taken By, (c) = Cosigned By      Initials Name Provider Type    ST Jarrett Woody PA-C Physician Assistant                    There is no height or weight on file to calculate BMI.  Wt Readings from Last 1 Encounters:   08/13/24 39.9 kg (88 lb)        Ideal body weight: 47.1 kg (103 lb 14.5 oz)    "

## 2024-08-21 LAB
ALBUMIN SERPL BCG-MCNC: 3.4 G/DL (ref 3.5–5)
ALP SERPL-CCNC: 64 U/L (ref 34–104)
ALT SERPL W P-5'-P-CCNC: 53 U/L (ref 7–52)
ANION GAP SERPL CALCULATED.3IONS-SCNC: 5 MMOL/L (ref 4–13)
AST SERPL W P-5'-P-CCNC: 45 U/L (ref 13–39)
ATRIAL RATE: 107 BPM
BASOPHILS # BLD AUTO: 0.05 THOUSANDS/ÂΜL (ref 0–0.1)
BASOPHILS NFR BLD AUTO: 1 % (ref 0–1)
BILIRUB DIRECT SERPL-MCNC: 0 MG/DL (ref 0–0.2)
BILIRUB SERPL-MCNC: 0.19 MG/DL (ref 0.2–1)
BUN SERPL-MCNC: 22 MG/DL (ref 5–25)
CALCIUM SERPL-MCNC: 9.6 MG/DL (ref 8.4–10.2)
CHLORIDE SERPL-SCNC: 105 MMOL/L (ref 96–108)
CO2 SERPL-SCNC: 31 MMOL/L (ref 21–32)
CREAT SERPL-MCNC: 0.89 MG/DL (ref 0.6–1.3)
EOSINOPHIL # BLD AUTO: 0.1 THOUSAND/ÂΜL (ref 0–0.61)
EOSINOPHIL NFR BLD AUTO: 2 % (ref 0–6)
ERYTHROCYTE [DISTWIDTH] IN BLOOD BY AUTOMATED COUNT: 13.5 % (ref 11.6–15.1)
GFR SERPL CREATININE-BSD FRML MDRD: 69 ML/MIN/1.73SQ M
GLUCOSE SERPL-MCNC: 116 MG/DL (ref 65–140)
GLUCOSE SERPL-MCNC: 99 MG/DL (ref 65–140)
HCT VFR BLD AUTO: 31.5 % (ref 34.8–46.1)
HGB BLD-MCNC: 9.8 G/DL (ref 11.5–15.4)
IMM GRANULOCYTES # BLD AUTO: 0.02 THOUSAND/UL (ref 0–0.2)
IMM GRANULOCYTES NFR BLD AUTO: 0 % (ref 0–2)
LACTATE SERPL-SCNC: 1.1 MMOL/L (ref 0.5–2)
LYMPHOCYTES # BLD AUTO: 1.55 THOUSANDS/ÂΜL (ref 0.6–4.47)
LYMPHOCYTES NFR BLD AUTO: 25 % (ref 14–44)
MCH RBC QN AUTO: 32.1 PG (ref 26.8–34.3)
MCHC RBC AUTO-ENTMCNC: 31.1 G/DL (ref 31.4–37.4)
MCV RBC AUTO: 103 FL (ref 82–98)
MONOCYTES # BLD AUTO: 0.54 THOUSAND/ÂΜL (ref 0.17–1.22)
MONOCYTES NFR BLD AUTO: 9 % (ref 4–12)
NEUTROPHILS # BLD AUTO: 3.95 THOUSANDS/ÂΜL (ref 1.85–7.62)
NEUTS SEG NFR BLD AUTO: 63 % (ref 43–75)
NRBC BLD AUTO-RTO: 0 /100 WBCS
P AXIS: 69 DEGREES
PLATELET # BLD AUTO: 106 THOUSANDS/UL (ref 149–390)
PMV BLD AUTO: 12.8 FL (ref 8.9–12.7)
POTASSIUM SERPL-SCNC: 4 MMOL/L (ref 3.5–5.3)
PR INTERVAL: 150 MS
PROCALCITONIN SERPL-MCNC: 13.29 NG/ML
PROT SERPL-MCNC: 6.3 G/DL (ref 6.4–8.4)
QRS AXIS: 88 DEGREES
QRSD INTERVAL: 68 MS
QT INTERVAL: 312 MS
QTC INTERVAL: 416 MS
RBC # BLD AUTO: 3.05 MILLION/UL (ref 3.81–5.12)
SODIUM SERPL-SCNC: 141 MMOL/L (ref 135–147)
T WAVE AXIS: 80 DEGREES
VENTRICULAR RATE: 107 BPM
WBC # BLD AUTO: 6.21 THOUSAND/UL (ref 4.31–10.16)

## 2024-08-21 PROCEDURE — 84145 PROCALCITONIN (PCT): CPT | Performed by: INTERNAL MEDICINE

## 2024-08-21 PROCEDURE — 92610 EVALUATE SWALLOWING FUNCTION: CPT

## 2024-08-21 PROCEDURE — 85025 COMPLETE CBC W/AUTO DIFF WBC: CPT | Performed by: INTERNAL MEDICINE

## 2024-08-21 PROCEDURE — 83605 ASSAY OF LACTIC ACID: CPT | Performed by: INTERNAL MEDICINE

## 2024-08-21 PROCEDURE — 80076 HEPATIC FUNCTION PANEL: CPT | Performed by: INTERNAL MEDICINE

## 2024-08-21 PROCEDURE — 93010 ELECTROCARDIOGRAM REPORT: CPT | Performed by: INTERNAL MEDICINE

## 2024-08-21 PROCEDURE — 80048 BASIC METABOLIC PNL TOTAL CA: CPT | Performed by: INTERNAL MEDICINE

## 2024-08-21 PROCEDURE — 82948 REAGENT STRIP/BLOOD GLUCOSE: CPT

## 2024-08-21 PROCEDURE — 99232 SBSQ HOSP IP/OBS MODERATE 35: CPT | Performed by: PHYSICIAN ASSISTANT

## 2024-08-21 RX ORDER — CARBIDOPA AND LEVODOPA 25; 100 MG/1; MG/1
1 TABLET, EXTENDED RELEASE ORAL 2 TIMES DAILY
Status: DISCONTINUED | OUTPATIENT
Start: 2024-08-21 | End: 2024-08-23 | Stop reason: HOSPADM

## 2024-08-21 RX ORDER — CITALOPRAM HYDROBROMIDE 20 MG/1
20 TABLET ORAL DAILY
Status: DISCONTINUED | OUTPATIENT
Start: 2024-08-21 | End: 2024-08-23 | Stop reason: HOSPADM

## 2024-08-21 RX ORDER — LEVOTHYROXINE SODIUM 25 UG/1
25 TABLET ORAL
Status: DISCONTINUED | OUTPATIENT
Start: 2024-08-21 | End: 2024-08-23 | Stop reason: HOSPADM

## 2024-08-21 RX ORDER — BACLOFEN 10 MG/1
10 TABLET ORAL 3 TIMES DAILY
Status: DISCONTINUED | OUTPATIENT
Start: 2024-08-21 | End: 2024-08-23 | Stop reason: HOSPADM

## 2024-08-21 RX ORDER — ACETAMINOPHEN 325 MG/1
650 TABLET ORAL EVERY 4 HOURS PRN
Status: DISCONTINUED | OUTPATIENT
Start: 2024-08-21 | End: 2024-08-23 | Stop reason: HOSPADM

## 2024-08-21 RX ORDER — ENOXAPARIN SODIUM 100 MG/ML
40 INJECTION SUBCUTANEOUS DAILY
Status: DISCONTINUED | OUTPATIENT
Start: 2024-08-21 | End: 2024-08-23 | Stop reason: HOSPADM

## 2024-08-21 RX ORDER — ALBUMIN, HUMAN INJ 5% 5 %
12.5 SOLUTION INTRAVENOUS ONCE
Status: COMPLETED | OUTPATIENT
Start: 2024-08-21 | End: 2024-08-21

## 2024-08-21 RX ORDER — POLYETHYLENE GLYCOL 3350 17 G/17G
17 POWDER, FOR SOLUTION ORAL DAILY
Status: DISCONTINUED | OUTPATIENT
Start: 2024-08-21 | End: 2024-08-23 | Stop reason: HOSPADM

## 2024-08-21 RX ORDER — PANTOPRAZOLE SODIUM 20 MG/1
20 TABLET, DELAYED RELEASE ORAL
Status: DISCONTINUED | OUTPATIENT
Start: 2024-08-21 | End: 2024-08-23 | Stop reason: HOSPADM

## 2024-08-21 RX ORDER — ARIPIPRAZOLE 2 MG/1
2 TABLET ORAL
Status: DISCONTINUED | OUTPATIENT
Start: 2024-08-21 | End: 2024-08-23 | Stop reason: HOSPADM

## 2024-08-21 RX ORDER — TRAZODONE HYDROCHLORIDE 50 MG/1
50 TABLET, FILM COATED ORAL
Status: DISCONTINUED | OUTPATIENT
Start: 2024-08-21 | End: 2024-08-23 | Stop reason: HOSPADM

## 2024-08-21 RX ORDER — ONDANSETRON 2 MG/ML
4 INJECTION INTRAMUSCULAR; INTRAVENOUS EVERY 6 HOURS PRN
Status: DISCONTINUED | OUTPATIENT
Start: 2024-08-21 | End: 2024-08-23 | Stop reason: HOSPADM

## 2024-08-21 RX ORDER — SODIUM CHLORIDE 9 MG/ML
75 INJECTION, SOLUTION INTRAVENOUS CONTINUOUS
Status: DISCONTINUED | OUTPATIENT
Start: 2024-08-21 | End: 2024-08-22

## 2024-08-21 RX ADMIN — BACLOFEN 10 MG: 10 TABLET ORAL at 09:44

## 2024-08-21 RX ADMIN — BACLOFEN 10 MG: 10 TABLET ORAL at 21:20

## 2024-08-21 RX ADMIN — POLYETHYLENE GLYCOL 3350 17 G: 17 POWDER, FOR SOLUTION ORAL at 09:33

## 2024-08-21 RX ADMIN — ALBUMIN (HUMAN) 12.5 G: 12.5 INJECTION, SOLUTION INTRAVENOUS at 06:49

## 2024-08-21 RX ADMIN — ENOXAPARIN SODIUM 40 MG: 40 INJECTION SUBCUTANEOUS at 09:33

## 2024-08-21 RX ADMIN — ARIPIPRAZOLE 2 MG: 2 TABLET ORAL at 21:20

## 2024-08-21 RX ADMIN — CEFTRIAXONE SODIUM 1000 MG: 10 INJECTION, POWDER, FOR SOLUTION INTRAVENOUS at 10:27

## 2024-08-21 RX ADMIN — SODIUM CHLORIDE, SODIUM LACTATE, POTASSIUM CHLORIDE, AND CALCIUM CHLORIDE 250 ML: .6; .31; .03; .02 INJECTION, SOLUTION INTRAVENOUS at 05:38

## 2024-08-21 RX ADMIN — SODIUM CHLORIDE 75 ML/HR: 0.9 INJECTION, SOLUTION INTRAVENOUS at 01:38

## 2024-08-21 RX ADMIN — CITALOPRAM HYDROBROMIDE 20 MG: 20 TABLET ORAL at 09:33

## 2024-08-21 RX ADMIN — GLYCERIN 2 DROP: .002; .002; .01 SOLUTION/ DROPS OPHTHALMIC at 09:32

## 2024-08-21 RX ADMIN — CARBIDOPA AND LEVODOPA 1 TABLET: 25; 100 TABLET, EXTENDED RELEASE ORAL at 09:33

## 2024-08-21 RX ADMIN — GLYCERIN 2 DROP: .002; .002; .01 SOLUTION/ DROPS OPHTHALMIC at 18:00

## 2024-08-21 NOTE — ASSESSMENT & PLAN NOTE
Patient inadvertently got another resident's Depakote on Monday   NH3 normal   Holding Depakote for now   Follow VPA level

## 2024-08-21 NOTE — CASE MANAGEMENT
Case Management Assessment & Discharge Planning Note    Patient name Terrie Alicea  Location W /W -01 MRN [id number]  : 1960 Date 2024       Current Admission Date: 2024  Current Admission Diagnosis:Severe sepsis (HCC)   Patient Active Problem List    Diagnosis Date Noted Date Diagnosed    Functional quadriplegia (HCC) 2024     Protein-calorie malnutrition, unspecified severity ([facility]) 2024     Hospital discharge follow-up 2024     SIRS (systemic inflammatory response syndrome) ([facility]) 2024     Developmental delay      Preop examination 2023     Cataract 2023     Internal hemorrhoids 2023     Contracture of right hand 2023     Need for shingles vaccine 2023     Viral illness 2022     Dystonic cerebral palsy ([facility]) 2022     Cervical dystonia 2022     Parkinsonism 2022     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[facility]) 10/18/2021     Polyneuropathy associated with underlying disease ([facility]) 10/18/2021     Hypercalcemia 2021     Herpes zoster without complication 2021     Abnormal finding on lung imaging 2021     Anemia 2020     Eosinophilic leukocytosis 2020     Underweight 2020     Dysphagia 2020     History of vitamin D deficiency 2020     History of fall 10/28/2019     Breast asymmetry 2019     Hypernatremia 2018     Severe sepsis ([facility]) 2018     Gait disturbance 2018     Osteoarthritis of both hips 2018     Severe Intellectual disability 2018     Type 2 diabetes mellitus, without long-term current use of insulin ([facility]) 2018     Abnormal albumin 2017     Peripheral neuropathy 2017     Seasonal allergies 2017     Physical deconditioning 2017     Hyperlipidemia 2017     Gastroesophageal reflux disease 2017     Mixed cerebral palsy (HCC) 2017     Spasticity 2017     Ambulatory dysfunction  03/27/2017     Bipolar disorder (HCC) 03/27/2017     Hypotension 04/11/2016     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/2016     Acute respiratory failure with hypoxia (HCC) 02/01/2016     Weight loss 01/21/2016     Osteoporosis 01/19/2016     Resting tremor 01/19/2016     Acute metabolic encephalopathy 12/11/2015     Gait abnormality 12/11/2015     Platelets decreased (HCC) 07/31/2015     Other chronic pain 07/30/2015     Postmenopausal vaginal bleeding 07/23/2015     Anxiety 07/15/2015     Menopause 09/25/2014     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/28/2014     Atopic dermatitis 06/02/2014     Dry eye 06/02/2014     Constipation 04/12/2013     Iron deficiency anemia 03/27/2013     Urinary incontinence 03/27/2013     Hypothyroidism 01/10/2013       LOS (days): 1  Geometric Mean LOS (GMLOS) (days):   Days to GMLOS:     OBJECTIVE:    Risk of Unplanned Readmission Score: 22.17         Current admission status: Inpatient       Preferred Pharmacy:   [facility] - [address]  [address]  [address]  [place] [address]  Phone: [phone] Fax: [phone]    [facility]  [address]  [place] [address]  Phone: [phone] Fax: [phone]    [facility] - [place] - [address]  [address] [address]  Phone: [phone] Fax: [phone]    Primary Care Provider: Gunnar Sylvester DO    Primary Insurance: [employer] MEDICARE [facility] RE[facility]  Secondary Insurance: [place] MEDICAL [place]    ASSESSMENT:  Active Health Care Proxies       Ella Morgan  [facility] Care Representative - Other   Primary Phone: [phone] (Mobile)  Work Phone: [phone]                           Readmission Root Cause  30 Day Readmission: No    Patient Information  Admitted from:: Other (comment) ([facility])  Mental Status: Other (Comment) (Non verbal)  During Assessment patient was  accompanied by: Not accompanied during assessment  Assessment information provided by:: Other - please comment (Lisa Penny, group home director)  Primary Caregiver: Other (Comment)  Caregiver's Name:: Staff at [facility]  Caregiver's Relationship to Patient:: Other (Specify) (Staff at facility)  Caregiver's Telephone Number:: [phone]  Support Systems: Home care staff  Home entry access options. Select all that apply.: No steps to enter home  Type of Current Residence: [place]  Living Arrangements: Other (Comment) ([facility])  Is patient a ?: No    Activities of Daily Living Prior to Admission  Functional Status: Total dependent  Completes ADLs independently?: No  Level of ADL dependence: Total Dependent  Ambulates independently?: No  Level of ambulatory dependence: Total Dependent  Does patient use assisted devices?: Yes  Assisted Devices (DME) used: Wheelchair, Other (Comment), Gerardo lift (Gerardo lift at day program, stand pivot with assist at )  Does patient currently own DME?: Yes  What DME does the patient currently own?: Wheelchair  Does patient have a history of Outpatient Therapy (PT/OT)?: Yes  Does the patient have a history of Short-Term Rehab?: No  Does patient have a history of HHC?: Yes  Does patient currently have HHC?: No         Patient Information Continued  Income Source: SSI/SSD  Does patient have prescription coverage?: Yes  Does patient receive dialysis treatments?: No  Does patient have a history of substance abuse?: No         Means of Transportation  Means of Transport to Appts:: Other (Comment) (Group Home transport)      Social Determinants of Health (SDOH)      Flowsheet Row Most Recent Value   Housing Stability    In the last 12 months, was there a time when you were not able to pay the mortgage or rent on time? N   In the past 12 months, how many times have you moved where you were living? 0   At any time in the past 12 months, were you  homeless or living in a shelter (including now)? N   Transportation Needs    In the past 12 months, has lack of transportation kept you from medical appointments or from getting medications? no   In the past 12 months, has lack of transportation kept you from meetings, work, or from getting things needed for daily living? No   Food Insecurity    Within the past 12 months, you worried that your food would run out before you got the money to buy more. Never true   Within the past 12 months, the food you bought just didn't last and you didn't have money to get more. Never true   Utilities    In the past 12 months has the electric, gas, oil, or water company threatened to shut off services in your home? No            DISCHARGE DETAILS:    Discharge planning discussed with:: Lisa group home director  Freedom of Choice: Yes  Comments - Freedom of Choice: CM spoke with Lisa on phone, patient non verbal. Patient is dependent for ADLs, stand pivot to , goes to adult day program M-F. At program they uses a adela lift. No O2, no HHC at this time. Group home provides transportation  CM contacted family/caregiver?: Yes  Were Treatment Team discharge recommendations reviewed with patient/caregiver?: Yes  Did patient/caregiver verbalize understanding of patient care needs?: Yes  Were patient/caregiver advised of the risks associated with not following Treatment Team discharge recommendations?: Yes    Contacts  Patient Contacts: Lisa  Relationship to Patient:: Other (Comment) (Group home director)  Contact Method: Phone  Phone Number: [phone]  Reason/Outcome: Continuity of Care, Emergency Contact, Discharge Planning              Other Referral/Resources/Interventions Provided:  Referral Comments: Evaluations pending

## 2024-08-21 NOTE — NUTRITION
08/21/24 7602   Recommendations/Interventions   Interventions/Recommendations Coordination of care;Tube feeding recs provided;Supplement initiate   Intervention Comments Noted poor PO intake. WIll add Magic Cup supplements   Recommendations to Provider When able to gain EN access, recommend starting Jevity 1.5 @ 20ml/hr and advance 10ml q 4 hrs to goal: 70ml/hr run x 12hrs overnight (840ml).   Provides 1260kcal, 54g PRO, 638ml free water.   Water flush of 140ml every 6hrs (560ml total).     At discharge, water flushes can be adjusted to 60ml before/after TF cycle with an additional 500mL water daily that can be divided amongst med passes (per Group Home preference).

## 2024-08-21 NOTE — MALNUTRITION/BMI
This medical record reflects one or more clinical indicators suggestive of malnutrition and underweight.    Malnutrition Findings:   Adult Malnutrition type: Chronic illness  Adult Degree of Malnutrition: Other severe protein calorie malnutrition  Malnutrition Characteristics: Inadequate energy, Weight loss                  360 Statement: Chronic/severe malnutrition r/t condition, as evidenced by intake meeting <75% of estimated needs x > 1 month, and 11% wt loss x 5 months (8/13/24: 88#, 3/12/24: 99#). Treatment: add oral nutrition supplements. Planning for EN, recommendations provided    BMI Findings:  Adult BMI Classifications: Underweight < 18.5        There is no height or weight on file to calculate BMI.     See Nutrition note dated 8/21/2024 for additional details.  Completed nutrition assessment is viewable in the nutrition documentation.

## 2024-08-21 NOTE — PROGRESS NOTES
[facility]  Progress Note  Name: Terrie Alicea I  MRN: [id number]  Unit/Bed#: W -01 I Date of Admission: 8/20/2024   Date of Service: 8/21/2024 I Hospital Day: 1    Assessment & Plan   * Severe sepsis (HCC)  Assessment & Plan  POA, evidenced by fever, tachycardia, elevated lactic acid. Most likely secondary to urinary tract infection vs pneumonia vs both   Lactic acid normalized   Fever curve trended down   HR improved   Procalcitonin elevated   Continue Rocephin   Follow up urine culture   Doubtful sputum culture will be obtainable   Trend procalcitonin   Continue fluids and monitor       Acute metabolic encephalopathy  Assessment & Plan  Most likely secondary to severe sepsis from UTI vs pneumonia, possibly due to medication error on Monday   Chest x-ray with early RLL infiltrate   Mental status improved   Continue IV ceftriaxone  Follow-up culture data.  Holding Depakote for now pending level   Change patient's trazodone to 50 mg nightly as needed insomnia  Discontinue Ativan nightly until patient mental status much better.  Discussed this with the patient's routine caregiver Lisa and she is in agreement with this plan of care.    Acute respiratory failure with hypoxia (HCC)  Assessment & Plan  Patient has hypoxic respiratory failure acute due to suspected pneumonia on CXR  Weaned to room air today   COVID/Flu negative   Continue with IV ceftriaxone.  Cannot rule out aspiration pneumonia which could be present on admission due to lethargy and weakness.  Patient chronically has dysphagia as well.  Follow culture data.    Trend procalcitonin.  Monitor O2 demand     Dystonic cerebral palsy (HCC)  Assessment & Plan  Noted, appears at baseline   Continue patient on Sinemet  Continue patient on baclofen.    Functional quadriplegia (HCC)  Assessment & Plan  Continue patient on baclofen due to spasticity.    Bipolar disorder (HCC)  Assessment & Plan  Patient inadvertently got another  resident's Depakote on Monday   NH3 normal   Holding Depakote for now   Follow VPA level     Severe Intellectual disability  Assessment & Plan  Patient lives in a group home  Bedbound and wheelchair-bound and dependent for all her ADLs.  Return to group home at discharge       Type 2 diabetes mellitus, without long-term current use of insulin ([facility])  Assessment & Plan  Lab Results   Component Value Date    HGBA1C 6.2 (H) 08/13/2024       Recent Labs     08/21/24  0429   POCGLU 99       Blood Sugar Average: Last 72 hrs:  (P) 99  Prior A1c noted and acceptable   Hold home regimen   Diabetic diet   QID accuchecks       Dysphagia  Assessment & Plan  Patient chronically has had dysphagia and was scheduled to get up PEG tube placed however was not able to achieve that by GI  She has surgery follow up planned   Follow up with General Surgery as planned  Continue current diet with modifications   Consult speech therapist.               VTE Pharmacologic Prophylaxis: VTE Score: 3 Moderate Risk (Score 3-4) - Pharmacological DVT Prophylaxis Ordered: enoxaparin (Lovenox).    Mobility:   -[facility] Achieved: 2: Bed activities/Dependent transfer  -[facility] Goal achieved. Continue to encourage appropriate mobility.    Patient Centered Rounds: I performed bedside rounds with nursing staff today.   Discussions with Specialists or Other Care Team Provider: Discussed with RN, CM    Education and Discussions with Family / Patient: Updated  (caretaker) via phone.    Total Time Spent on Date of Encounter in care of patient: 35 mins. This time was spent on one or more of the following: performing physical exam; counseling and coordination of care; obtaining or reviewing history; documenting in the medical record; reviewing/ordering tests, medications or procedures; communicating with other healthcare professionals and discussing with patient's family/caregivers.    Current Length of Stay: 1 day(s)  Current Patient Status: Inpatient    Certification Statement: The patient will continue to require additional inpatient hospital stay due to monitoring infection, further culture data pending   Discharge Plan: Anticipate discharge in 24-48 hrs to group home.    Code Status: Level 1 - Full Code    Subjective:   Patient unable to provide history.     Objective:     Vitals:   Temp (24hrs), Av.8 °F (37.7 °C), Min:98 °F (36.7 °C), Max:101.6 °F (38.7 °C)    Temp:  [98 °F (36.7 °C)-101.6 °F (38.7 °C)] 98 °F (36.7 °C)  HR:  [67-89] 67  Resp:  [18] 18  BP: ()/(50-66) 90/60  SpO2:  [94 %-99 %] 98 %  There is no height or weight on file to calculate BMI.     Input and Output Summary (last 24 hours):     Intake/Output Summary (Last 24 hours) at 2024 1252  Last data filed at 2024 1100  Gross per 24 hour   Intake 2050 ml   Output 419 ml   Net 1631 ml       Physical Exam:   Physical Exam  Constitutional:       General: She is not in acute distress.     Appearance: Normal appearance. She is underweight. She is not ill-appearing or diaphoretic.      Interventions: Nasal cannula in place.   HENT:      Head: Normocephalic and atraumatic.      Mouth/Throat:      Mouth: Mucous membranes are moist.   Eyes:      General: No scleral icterus.     Pupils: Pupils are equal, round, and reactive to light.   Cardiovascular:      Rate and Rhythm: Normal rate and regular rhythm.      Pulses: Normal pulses.      Heart sounds: Normal heart sounds, S1 normal and S2 normal. No murmur heard.     No systolic murmur is present.      No diastolic murmur is present.      No gallop. No S3 or S4 sounds.   Pulmonary:      Effort: Pulmonary effort is normal. No accessory muscle usage or respiratory distress.      Breath sounds: Normal breath sounds. No stridor. No decreased breath sounds, wheezing, rhonchi or rales.   Chest:      Chest wall: No tenderness.   Abdominal:      General: Bowel sounds are normal. There is no distension.      Palpations: Abdomen is soft.       Tenderness: There is no abdominal tenderness. There is no guarding.   Musculoskeletal:      Right lower leg: No edema.      Left lower leg: No edema.   Skin:     General: Skin is warm and dry.      Coloration: Skin is not jaundiced.   Neurological:      Mental Status: She is alert. Mental status is at baseline.      Motor: Tremor and abnormal muscle tone present. No seizure activity.   Psychiatric:         Behavior: Behavior is cooperative.          Additional Data:     Labs:  Results from last 7 days   Lab Units 08/21/24  0442   WBC Thousand/uL 6.21   HEMOGLOBIN g/dL 9.8*   HEMATOCRIT % 31.5*   PLATELETS Thousands/uL 106*   SEGS PCT % 63   LYMPHO PCT % 25   MONO PCT % 9   EOS PCT % 2     Results from last 7 days   Lab Units 08/21/24  0442   SODIUM mmol/L 141   POTASSIUM mmol/L 4.0   CHLORIDE mmol/L 105   CO2 mmol/L 31   BUN mg/dL 22   CREATININE mg/dL 0.89   ANION GAP mmol/L 5   CALCIUM mg/dL 9.6   ALBUMIN g/dL 3.4*   TOTAL BILIRUBIN mg/dL 0.19*   ALK PHOS U/L 64   ALT U/L 53*   AST U/L 45*   GLUCOSE RANDOM mg/dL 116     Results from last 7 days   Lab Units 08/20/24  1349   INR  0.94     Results from last 7 days   Lab Units 08/21/24  0429   POC GLUCOSE mg/dl 99         Results from last 7 days   Lab Units 08/21/24  0442 08/20/24  1727 08/20/24  1425 08/20/24  1239   LACTIC ACID mmol/L 1.1 2.3* 2.8* 3.9*   PROCALCITONIN ng/ml 13.29*  --   --  1.80*       Lines/Drains:  Invasive Devices       Peripheral Intravenous Line  Duration             Peripheral IV 08/20/24 Proximal;Right;Ventral (anterior) Forearm 1 day                          Imaging: Reviewed radiology reports from this admission including: chest xray    Recent Cultures (last 7 days):   Results from last 7 days   Lab Units 08/20/24  1239   BLOOD CULTURE  Received in Microbiology Lab. Culture in Progress.  Received in Microbiology Lab. Culture in Progress.       Last 24 Hours Medication List:   Current Facility-Administered Medications   Medication Dose  Route Frequency Provider Last Rate    acetaminophen  650 mg Oral Q4H PRN Jennifer Hernandez MD      albuterol  2.5 mg Nebulization Q4H PRN Jennifer Hernandez MD      ARIPiprazole  2 mg Oral HS Jennifer Hernandez MD      Artificial Tears  2 drop Both Eyes BID Jennifer Hernandez MD      baclofen  10 mg Oral TID Jennifer Hernandez MD      carbidopa-levodopa  1 tablet Oral BID Jennifer Hernandez MD      cefTRIAXone  1,000 mg Intravenous Q24H Jennifer Hernandez MD 1,000 mg (08/21/24 1027)    citalopram  20 mg Oral Daily Jennifer Hernandez MD      enoxaparin  40 mg Subcutaneous Daily Jennifer Hernandez MD      levothyroxine  25 mcg Oral Early Morning Jennifer Hernandez MD      ondansetron  4 mg Intravenous Q6H PRN Jennifer Hernandez MD      pantoprazole  20 mg Oral Early Morning Jennifer Hernandez MD      polyethylene glycol  17 g Oral Daily Jennifer Hernandez MD      sodium chloride  75 mL/hr Intravenous Continuous Jennifer Hernandez MD 75 mL/hr (08/21/24 0718)    traZODone  50 mg Per G Tube HS PRN Jennifer Hernandez MD          Today, Patient Was Seen By: Aniket Whatley PA-C    **Please Note: This note may have been constructed using a voice recognition system.**

## 2024-08-21 NOTE — SPEECH THERAPY NOTE
Speech-Language Pathology Bedside Swallow Evaluation        Patient Name: Terrie Alicea    Today's Date: 8/21/2024     Problem List  Principal Problem:    Severe sepsis (HCC)  Active Problems:    Acute respiratory failure with hypoxia (HCC)    Bipolar disorder (HCC)    Severe Intellectual disability    Type 2 diabetes mellitus, without long-term current use of insulin (HCC)    Acute metabolic encephalopathy    Constipation    Hypothyroidism    Dysphagia    Dystonic cerebral palsy (HCC)    Functional quadriplegia (HCC)         Summary    Pt presents with baseline hx of significant oropharyngeal dysphagia with known silent aspiration per previous VBS's.  Pt w/ limited po intake, resistant to open mouth and take po. Per chart review, pt is tent for surgery consult as outpt for possible G- tube placement. GI  not able to be place PEG during EGD 8/13/24.     Recommendations:   Diet: puree/level 1 diet and honey thick liquids by tsp only  Meds: crushed with puree   Frequent Oral care: 2x/day  Aspiration precautions   Other Recommendations/ considerations: will follow up x 1-2  as needed for diet tolerance .       Current Medical Status  Pt is a 64 y.o. female who presented to [facility]  with severe sepsis. Pt  presents with chief complaints of fevers, chills, and lethargy since 1 day.     Past medical history:   Please see H&P for details    Special Studies:  CXR: 8/20/24 Development of right basal groundglass opacity consistent with infiltrate   VBS: 11/14/23 Pt presents with moderate to severe oral-pharyngeal dysphagia characterized by impaired lip closure, bolus control and transfer, swallow initiation, soft palate elevation, hyolaryngeal elevation, and airway entrance closure. Pt with silent aspiration prior to the swallow given HTL with reduced head position in 2/2 trials. High flash penetration in 1/2 trials given HTL via teaspoon with improved head control/position. No penetration/aspiration observed  "with pudding or puree.     Social/Education/Vocational Hx:  Pt lives in group home    Swallow Information     Current Risks for Dysphagia & Aspiration: known history of dysphagia and known history of aspiration  Current Symptoms/Concerns:  hx of aspiration and dysphagia   Current Diet: puree/level 1 diet and honey thick liquids   Baseline Diet: puree/level 1 diet and honey thick liquids  Takes pills-crushed     Baseline Assessment   Behavior/Cognition: alert; occas verbalization \"No\"   Speech/Language Status: not able to to follow commands and no verbal output noted  Patient Positioning: upright in bed     Swallow Mechanism Exam   Facial: symmetrical  Labial: unable to test 2/2 limited command following  Lingual: unable to test 2/2 limited command following  Velum: unable to visualize  Mandible: adequate ROM  Dentition: adequate  Vocal quality:clear/adequate   Volitional Cough: unable to initiate volitional cough -spont moist cough  Respiratory: NC    Consistencies Assessed and Performance   Consistencies Administered: honey thick and puree (1/2 yogurt, few tsps of HTL, and assisted RN w/ meds crushed)     Oral Stage: pt occas opens mouth for utensil, but blocks entry w/ tongue. Sig anterior spill noted with HTL, less w/ puree. Increased drooling noted during session. Occas oral hold of bolus, delayed transfer. Oral residue noted.    Pharyngeal Stage: swallow initiation delayed with adequate laryngeal excursion palpated. Delayed moist cough noted x1.       Esophageal Concerns: none reported      Results Reviewed with: RN and PA   Dysphagia Goals: pt will tolerate puree with honey thick liquids by tsp without s/s of aspiration x1-2 sessions       Sade Hirsch MA CCC-SLP  Speech Pathologist  [place] license # [id number]  [place] license # [id number]  Available via Secure Chat       "

## 2024-08-21 NOTE — PLAN OF CARE
Problem: Prexisting or High Potential for Compromised Skin Integrity  Goal: Skin integrity is maintained or improved  Description: INTERVENTIONS:  - Identify patients at risk for skin breakdown  - Assess and monitor skin integrity  - Assess and monitor nutrition and hydration status  - Monitor labs   - Assess for incontinence   - Turn and reposition patient  - Assist with mobility/ambulation  - Relieve pressure over bony prominences  - Avoid friction and shearing  - Provide appropriate hygiene as needed including keeping skin clean and dry  - Evaluate need for skin moisturizer/barrier cream  - Collaborate with interdisciplinary team   - Patient/family teaching  - Consider wound care consult   Outcome: Progressing     Problem: PAIN - ADULT  Goal: Verbalizes/displays adequate comfort level or baseline comfort level  Description: Interventions:  - Encourage patient to monitor pain and request assistance  - Assess pain using appropriate pain scale  - Administer analgesics based on type and severity of pain and evaluate response  - Implement non-pharmacological measures as appropriate and evaluate response  - Consider cultural and social influences on pain and pain management  - Notify physician/advanced practitioner if interventions unsuccessful or patient reports new pain  Outcome: Progressing     Problem: INFECTION - ADULT  Goal: Absence or prevention of progression during hospitalization  Description: INTERVENTIONS:  - Assess and monitor for signs and symptoms of infection  - Monitor lab/diagnostic results  - Monitor all insertion sites, i.e. indwelling lines, tubes, and drains  - Monitor endotracheal if appropriate and nasal secretions for changes in amount and color  - [place] appropriate cooling/warming therapies per order  - Administer medications as ordered  - Instruct and encourage patient and family to use good hand hygiene technique  - Identify and instruct in appropriate isolation precautions for  identified infection/condition  Outcome: Progressing  Goal: Absence of fever/infection during neutropenic period  Description: INTERVENTIONS:  - Monitor WBC    Outcome: Progressing     Problem: SAFETY ADULT  Goal: Patient will remain free of falls  Description: INTERVENTIONS:  - Educate patient/family on patient safety including physical limitations  - Instruct patient to call for assistance with activity   - Consult OT/PT to assist with strengthening/mobility   - Keep Call bell within reach  - Keep bed low and locked with side rails adjusted as appropriate  - Keep care items and personal belongings within reach  - Initiate and maintain comfort rounds  - Make Fall Risk Sign visible to staff  - Offer Toileting every  Hours, in advance of need  - Initiate/Maintain alarm  - Obtain necessary fall risk management equipment:  - Apply yellow socks and bracelet for high fall risk patients  - Consider moving patient to room near nurses station  Outcome: Progressing  Goal: Maintain or return to baseline ADL function  Description: INTERVENTIONS:  -  Assess patient's ability to carry out ADLs; assess patient's baseline for ADL function and identify physical deficits which impact ability to perform ADLs (bathing, care of mouth/teeth, toileting, grooming, dressing, etc.)  - Assess/evaluate cause of self-care deficits   - Assess range of motion  - Assess patient's mobility; develop plan if impaired  - Assess patient's need for assistive devices and provide as appropriate  - Encourage maximum independence but intervene and supervise when necessary  - Involve family in performance of ADLs  - Assess for home care needs following discharge   - Consider OT consult to assist with ADL evaluation and planning for discharge  - Provide patient education as appropriate  Outcome: Progressing  Goal: Maintains/Returns to pre admission functional level  Description: INTERVENTIONS:  - Perform AM-PAC 6 Click Basic Mobility/ Daily Activity  assessment daily.  - Set and communicate daily mobility goal to care team and patient/family/caregiver.   - Collaborate with rehabilitation services on mobility goals if consulted  - Perform Range of Motion times a day.  - Reposition patient every  hours.  - Dangle patient  times a day  - Stand patient  times a day  - Ambulate patient  times a day  - Out of bed to chair  times a day   - Out of bed for meals  times a day  - Out of bed for toileting  - Record patient progress and toleration of activity level   Outcome: Progressing     Problem: DISCHARGE PLANNING  Goal: Discharge to home or other facility with appropriate resources  Description: INTERVENTIONS:  - Identify barriers to discharge w/patient and caregiver  - Arrange for needed discharge resources and transportation as appropriate  - Identify discharge learning needs (meds, wound care, etc.)  - Arrange for interpretive services to assist at discharge as needed  - Refer to Case Management Department for coordinating discharge planning if the patient needs post-hospital services based on physician/advanced practitioner order or complex needs related to functional status, cognitive ability, or social support system  Outcome: Progressing     Problem: Knowledge Deficit  Goal: Patient/family/caregiver demonstrates understanding of disease process, treatment plan, medications, and discharge instructions  Description: Complete learning assessment and assess knowledge base.  Interventions:  - Provide teaching at level of understanding  - Provide teaching via preferred learning methods  Outcome: Progressing

## 2024-08-21 NOTE — ASSESSMENT & PLAN NOTE
Lab Results   Component Value Date    HGBA1C 6.2 (H) 08/13/2024       Recent Labs     08/21/24  0429   POCGLU 99       Blood Sugar Average: Last 72 hrs:  (P) 99  Prior A1c noted and acceptable   Hold home regimen   Diabetic diet   QID accuchecks

## 2024-08-21 NOTE — UTILIZATION REVIEW
Initial Clinical Review    Admission: Date/Time/Statement:   Admission Orders (From admission, onward)       Ordered        08/20/24 1447  INPATIENT ADMISSION  Once                          Orders Placed This Encounter   Procedures    INPATIENT ADMISSION     Standing Status:   Standing     Number of Occurrences:   1     Order Specific Question:   Level of Care     Answer:   Med Surg [16]     Order Specific Question:   Estimated length of stay     Answer:   More than 2 Midnights     Order Specific Question:   Certification     Answer:   I certify that inpatient services are medically necessary for this patient for a duration of greater than two midnights. See H&P and MD Progress Notes for additional information about the patient's course of treatment.     ED Arrival Information       Expected   -    Arrival   8/20/2024 12:01    Acuity   Emergent              Means of arrival   Wheelchair    Escorted by   Caregiver    Service   Hospitalist    Admission type   Emergency              Arrival complaint   High fever, chills             Chief Complaint   Patient presents with    Fever     As per caregiver pt had chills today, lethargic, fevers, also had a med error yesterday received extra meds       Initial Presentation: 64 y.o. female with PMHx of spastic cerebral palsy, wheelchair-bound,  DM2, severe intellectual disability, and dysphagia who [facility] of fevers, chills, and lethargy.  Patient yesterday accidentally received extra dose of Depakote along with some laxative medications however her evening dose of Depakote  was held.  Overall patient received slightly lower dose total compared to her routine of Depakote.  But this morning patient was lethargic and having chills after her  visit.  In the ED patient was found to have a Tmax of 102.8. Initial room air O2 sat was 91% and she was placed on supplemental oxygen.  ED work up revealed WBC 3.56 with elevated  procalcitonin of 1.8 and lactic acid of 3.9.  UA positive for leukocytes, WBC and bacteria. Final read of CXR pending.  The patient received IV abx. And IVF in the ED.  Exam:  Alert. Tachycardia. Normal breath sounds. Erythema, portion between B/L buttock skin folds, no wounds.      8/20 Inpatient admission for evaluation and treatment of severe sepsis, likely secondary to UTI,  acute metabolic encephalopathy, dysphagia, acute respiratory failure with hypoxia:  Continue IV ceftriaxone, follow cultures, trend procalcitonin.  Continue IVF, Discontinue Ativan nightly. Consult Speech. O2 and nebulizers.  Anticipated Length of Stay:  Patient will be admitted on an Inpatient basis with an anticipated length of stay of  > 2 midnights.   Justification for Hospital Stay: Severe Sepsis 2/2 UTI Suspected.     8/21 Internal Medicine:  Fever curve trended down, lactic acid normalized, weaned to room air today. Suspected pneumonia on CXR.  Procalcitonin elevated, follow cultures. Continue Rocephin, continue IVF.  Mental status improved, holding Depakote for now pending level.  Exam:  Alert. Mental status at baseline. Decreased breath sounds. Speech Bedside swallow eval:   Pt presents with baseline hx of significant oropharyngeal dysphagia with known silent aspiration per previous VBS's.  Pt w/ limited po intake, resistant to open mouth and take po. Per chart review, pt is tent for surgery consult as outpt for possible G- tube placement. GI  not able to be place PEG during EGD 8/13/24. Recommendations:  Diet: puree/level 1 diet and honey thick liquids by tsp only. Meds: crushed with puree.    8/22  Day 3: Has surpassed a 2nd midnight with active treatments and services. Internal Medicine:   Urine culture with E. Coli, continue with IV ceftriaxone, follow urine culture sensitivities.  Procalcitonin down trending. Holding Depakote for now, pending level. Monitor O2 demand.   Chronic/severe malnutrition r/t condition, as evidenced by  intake meeting <75% of estimated needs x > 1 month, and 11% wt loss x 5 months (8/13/24: 88#, 3/12/24: 99#). Treatment: add oral nutrition supplements. Planning for EN, recommendations provided. She will be following up with General Surgery for hopeful feeding tube placement. BMI Findings: Adult BMI Classifications: Underweight < 18.5   Exam: Alert, mental status at baseline. Tremor present.         ED Triage Vitals [08/20/24 1209]   Temperature Pulse Respirations Blood Pressure SpO2 Pain Score   (!) 102.8 °F (39.3 °C) (!) 112 17 133/69 91 % --     Weight (last 2 days)       None            Vital Signs (last 3 days)       Date/Time Temp Pulse Resp BP MAP (mmHg) SpO2 Calculated FIO2 (%) - Nasal Cannula Nasal Cannula O2 Flow Rate (L/min) O2 Device    08/22/24 07:17:26 -- 88 17 116/64 81 93 % -- -- --    08/21/24 21:35:01 100 °F (37.8 °C) 83 20 114/62 79 89 % -- -- --    08/21/24 15:44:39 99.3 °F (37.4 °C) 95 16 114/61 79 90 % -- -- --    08/21/24 0900 -- -- -- -- -- -- -- -- None (Room air)    08/21/24 0720 -- -- -- 90/60 -- -- -- -- --    08/21/24 0719 -- -- 18 -- -- -- 28 2 L/min Nasal cannula    08/21/24 07:17:39 98 °F (36.7 °C) 67 -- 95/51 66 98 % -- -- --    08/21/24 0641 -- -- -- 84/60 -- -- -- -- --    08/21/24 06:38:34 98.6 °F (37 °C) 73 -- 87/50 62 97 % -- -- --    08/21/24 0638 -- 77 -- 87/50 62 97 % -- -- --    08/21/24 0533 -- -- -- 84/62 -- -- -- -- --    08/21/24 04:56:28 -- 77 -- 86/50 62 94 % -- -- --    08/21/24 04:42:13 -- 82 -- 88/51 63 95 % -- -- --    08/21/24 04:36:29 -- 89 -- 87/51 63 95 % -- -- --    08/21/24 04:31:06 -- 81 -- 86/50 62 96 % -- -- --    08/21/24 04:12:47 99.3 °F (37.4 °C) 87 -- -- -- 97 % -- -- --    08/20/24 22:36:15 -- 77 -- 96/60 72 99 % -- -- --    08/20/24 1700 100.7 °F (38.2 °C) 77 -- 98/59 72 97 % -- -- --    08/20/24 1545 -- 83 -- 101/66 78 94 % -- -- None (Room air)    08/20/24 1536 100.7 °F (38.2 °C) -- -- -- -- -- -- -- --    08/20/24 1445 -- 85 18 102/63 78 95 % 28 2  L/min Nasal cannula    08/20/24 1430 -- 84 18 97/57 72 95 % 28 2 L/min Nasal cannula    08/20/24 1415 -- 85 18 98/59 74 97 % -- -- --    08/20/24 1400 -- 89 18 104/60 77 96 % -- -- --    08/20/24 1302 101.6 °F (38.7 °C) -- -- -- -- -- -- -- --    08/20/24 1250 -- -- -- -- -- 93 % 28 2 L/min Nasal cannula    08/20/24 1245 -- 104 18 124/71 92 85 % -- -- None (Room air)    08/20/24 1211 102.8 °F (39.3 °C) -- -- -- -- -- -- -- --    08/20/24 1209 102.8 °F (39.3 °C) 112 17 133/69 93 91 % -- -- None (Room air)              Pertinent Labs/Diagnostic Test Results:       Radiology:  XR chest 1 view portable   ED Interpretation by Jarrett Woody PA-C (08/20 6146)   No acute cardiopulmonary process.      Final Interpretation by Manolo Allred MD (08/21 0531)      Development of right basal groundglass opacity consistent with infiltrate      Low lung volume            Workstation performed: [id number]           Cardiology:    ECG 12 lead   Final Result by Karen Salas DO (08/21 0449)   Sinus tachycardia   Otherwise normal ECG   When compared with ECG of 10-MAY-2024 09:19,   No significant change was found   Confirmed by Karen Salas ([id number]) on 8/21/2024 4:39:34 PM            Results from last 7 days   Lab Units 08/20/24  1239   SARS-COV-2  Negative     Results from last 7 days   Lab Units 08/22/24  0808 08/21/24  0442 08/20/24  1239 08/20/24  0759   WBC Thousand/uL 4.22* 6.21 4.31 3.56*   HEMOGLOBIN g/dL 10.1* 9.8* 11.9 12.0   HEMATOCRIT % 30.5* 31.5* 36.3 36.8   PLATELETS Thousands/uL 119* 106* 106* 111*   TOTAL NEUT ABS Thousands/µL  --  3.95 3.30  --          Results from last 7 days   Lab Units 08/22/24  0808 08/21/24  0442 08/20/24  1239 08/20/24  0759   SODIUM mmol/L 141 141 137 139   POTASSIUM mmol/L 4.0 4.0 4.8 4.5   CHLORIDE mmol/L 105 105 99 98   CO2 mmol/L 27 31 27 28   ANION GAP mmol/L 9 5 11 13   BUN mg/dL 18 22 26* 25   CREATININE mg/dL 0.82 0.89 1.04 1.09   EGFR ml/min/1.73sq m 76 69 57 54   CALCIUM mg/dL  9.1 9.6 10.8* 11.0*     Results from last 7 days   Lab Units 08/21/24  0442 08/20/24  1727 08/20/24  1239 08/20/24  0759   AST U/L 45*  --  57* 61*   ALT U/L 53*  --  33 22   ALK PHOS U/L 64  --  85 86   TOTAL PROTEIN g/dL 6.3*  --  7.4 7.3   ALBUMIN g/dL 3.4*  --  3.9 3.8   TOTAL BILIRUBIN mg/dL 0.19*  --  0.22 0.20   BILIRUBIN DIRECT mg/dL 0.00  --   --   --    AMMONIA umol/L  --  31  --   --      Results from last 7 days   Lab Units 08/21/24  0429   POC GLUCOSE mg/dl 99     Results from last 7 days   Lab Units 08/22/24  0808 08/21/24  0442 08/20/24  1239 08/20/24  0759   GLUCOSE RANDOM mg/dL 214* 116 168* 265*               Results from last 7 days   Lab Units 08/20/24  1727   PH LISSA  7.405*   PCO2 LISSA mm Hg 48.4   PO2 LISSA mm Hg 49.8*   HCO3 LISSA mmol/L 29.6   BASE EXC LISSA mmol/L 4.2   O2 CONTENT LISSA ml/dL 12.5   O2 HGB, VENOUS % 83.3*                     Results from last 7 days   Lab Units 08/20/24  1349 08/20/24  0759   PROTIME seconds 13.3 12.4   INR  0.94 0.90   PTT seconds 25  --          Results from last 7 days   Lab Units 08/22/24  0808 08/21/24  0442 08/20/24  1239   PROCALCITONIN ng/ml 7.11* 13.29* 1.80*     Results from last 7 days   Lab Units 08/21/24  0442 08/20/24  1727 08/20/24  1425 08/20/24  1239   LACTIC ACID mmol/L 1.1 2.3* 2.8* 3.9*               Results from last 7 days   Lab Units 08/20/24  1303   CLARITY UA  Extra Turbid   COLOR UA  Yellow   SPEC GRAV UA  1.016   PH UA  6.5   GLUCOSE UA mg/dl Negative   KETONES UA mg/dl 10 (1+)*   BLOOD UA  Small*   PROTEIN UA mg/dl 30 (1+)*   NITRITE UA  Negative   BILIRUBIN UA  Negative   UROBILINOGEN UA (BE) mg/dl <2.0   LEUKOCYTES UA  Large*   WBC UA /hpf Innumerable*   RBC UA /hpf 30-50*   BACTERIA UA /hpf Moderate*   EPITHELIAL CELLS WET PREP /hpf None Seen     Results from last 7 days   Lab Units 08/20/24  1239   INFLUENZA A PCR  Negative   INFLUENZA B PCR  Negative   RSV PCR  Negative             Results from last 7 days   Lab Units 08/20/24  1303  08/20/24  1239   BLOOD CULTURE   --  No Growth at 24 hrs.  No Growth at 24 hrs.   URINE CULTURE  >100,000 cfu/ml Escherichia coli*  --                    ED Treatment-Medication Administration from 08/20/2024 1201 to 08/20/2024 1641         Date/Time Order Dose Route Action     08/20/2024 1307 acetaminophen (Ofirmev) IVPB 600 mg 600 mg Intravenous New Bag     08/20/2024 1245 sodium chloride 0.9 % bolus 1,000 mL 1,000 mL Intravenous New Bag     08/20/2024 1349 sodium chloride 0.9 % bolus 1,000 mL 1,000 mL Intravenous New Bag     08/20/2024 1350 ceftriaxone (ROCEPHIN) 1 g/50 mL in dextrose IVPB 1,000 mg Intravenous New Bag            Past Medical History:   Diagnosis Date    Acute metabolic encephalopathy 12/11/2015    Adjustment disorder     Altered mental status 12/11/2015    Anemia     Bipolar 1 disorder ([facility])     Cerebral palsy ([facility])     Chronic hypernatremia 02/06/2016    Closed fracture of left hip ([facility]) 01/19/2016    Closed left hip fracture ([facility])     no surgery    Constipation     Dehydration 02/20/2016    Developmental delay     Diabetes mellitus ([facility])     Difficulty walking     Disease of thyroid gland     Diverticulosis     Dysphagia     Worsening    Fracture of multiple ribs of right side     Herpes zoster without complication 06/02/2021    Hip fracture ([facility]) 07/26/2015    left    Hyperlipidemia     Hypernatremia 12/28/2018    Hypotension     Impulse control disorder     Incontinence     Intellectual disability due to developmental disorder, unspecified     Microalbuminuria     Neuropathy in diabetes ([facility])     Osteopathia     Osteoporosis     Peripheral neuropathy     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[facility])     Thrombocytopenia ([facility]) 07/31/2015     Present on Admission:   Severe sepsis ([facility])   Bipolar disorder ([facility])   Severe Intellectual disability   Type 2 diabetes mellitus, without long-term current use of insulin ([facility])   Functional quadriplegia ([facility])   Dystonic cerebral palsy ([facility])   Dysphagia   Acute metabolic  encephalopathy   Acute respiratory failure with hypoxia ([facility])   Severe protein-calorie malnutrition ([facility])      Admitting Diagnosis: Urinary tract infection [N39.0]  Fever [R50.9]  Sepsis ([facility]) [A41.9]  Age/Sex: 64 y.o. female        Admission Orders:      Scheduled Medications:      ARIPiprazole, 2 mg, Oral, HS  Artificial Tears, 2 drop, Both Eyes, BID  baclofen, 10 mg, Oral, TID  carbidopa-levodopa, 1 tablet, Oral, BID  cefTRIAXone, 1,000 mg, Intravenous, Q24H  citalopram, 20 mg, Oral, Daily  enoxaparin, 40 mg, Subcutaneous, Daily  levothyroxine, 25 mcg, Oral, Early Morning  pantoprazole, 20 mg, Oral, Early Morning  polyethylene glycol, 17 g, Oral, Daily      Continuous IV Infusions:      sodium chloride, 75 mL/hr, Intravenous, Continuous      PRN Meds:  acetaminophen, 650 mg, Oral, Q4H PRN  albuterol, 2.5 mg, Nebulization, Q4H PRN  ondansetron, 4 mg, Intravenous, Q6H PRN  traZODone, 50 mg, Per G Tube, HS PRN          Network Utilization Review Department  ATTENTION: Please call with any questions or concerns to [phone] and carefully listen to the prompts so that you are directed to the right person. All voicemails are confidential.   For Discharge needs, contact Care Management DC Support Team at [phone] opt. 2  Send all requests for admission clinical reviews, approved or denied determinations and any other requests to dedicated fax number below belonging to the campus where the patient is receiving treatment. List of dedicated fax numbers for the Facilities:  FACILITY NAME UR FAX NUMBER   ADMISSION DENIALS (Administrative/Medical Necessity) [phone]   DISCHARGE SUPPORT TEAM (NETWORK) [phone]   PARENT CHILD HEALTH (Maternity/NICU/Pediatrics) [phone]   [facility] [phone]   [facility] [phone]   [facility] [phone]   [facility] [phone]   [facility]  [place] [phone]   [facility] [phone]   [facility] [phone]   [facility] [phone]   [facility] [phone]   [facility] [phone]   [facility] [phone]   [facility] [phone]

## 2024-08-21 NOTE — ASSESSMENT & PLAN NOTE
Most likely secondary to severe sepsis from UTI vs pneumonia, possibly due to medication error on Monday   Chest x-ray with early RLL infiltrate   Mental status improved   Continue IV ceftriaxone  Follow-up culture data.  Holding Depakote for now pending level   Change patient's trazodone to 50 mg nightly as needed insomnia  Discontinue Ativan nightly until patient mental status much better.  Discussed this with the patient's routine caregiver Lisa and she is in agreement with this plan of care.

## 2024-08-21 NOTE — PLAN OF CARE
Problem: Prexisting or High Potential for Compromised Skin Integrity  Goal: Skin integrity is maintained or improved  Description: INTERVENTIONS:  - Identify patients at risk for skin breakdown  - Assess and monitor skin integrity  - Assess and monitor nutrition and hydration status  - Monitor labs   - Assess for incontinence   - Turn and reposition patient  - Assist with mobility/ambulation  - Relieve pressure over bony prominences  - Avoid friction and shearing  - Provide appropriate hygiene as needed including keeping skin clean and dry  - Evaluate need for skin moisturizer/barrier cream  - Collaborate with interdisciplinary team   - Patient/family teaching  - Consider wound care consult   Outcome: Progressing     Problem: PAIN - ADULT  Goal: Verbalizes/displays adequate comfort level or baseline comfort level  Description: Interventions:  - Encourage patient to monitor pain and request assistance  - Assess pain using appropriate pain scale  - Administer analgesics based on type and severity of pain and evaluate response  - Implement non-pharmacological measures as appropriate and evaluate response  - Consider cultural and social influences on pain and pain management  - Notify physician/advanced practitioner if interventions unsuccessful or patient reports new pain  Outcome: Progressing     Problem: INFECTION - ADULT  Goal: Absence or prevention of progression during hospitalization  Description: INTERVENTIONS:  - Assess and monitor for signs and symptoms of infection  - Monitor lab/diagnostic results  - Monitor all insertion sites, i.e. indwelling lines, tubes, and drains  - Monitor endotracheal if appropriate and nasal secretions for changes in amount and color  - Allen appropriate cooling/warming therapies per order  - Administer medications as ordered  - Instruct and encourage patient and family to use good hand hygiene technique  - Identify and instruct in appropriate isolation precautions for  identified infection/condition  Outcome: Progressing  Goal: Absence of fever/infection during neutropenic period  Description: INTERVENTIONS:  - Monitor WBC    Outcome: Progressing     Problem: SAFETY ADULT  Goal: Patient will remain free of falls  Description: INTERVENTIONS:  - Educate patient/family on patient safety including physical limitations  - Instruct patient to call for assistance with activity   - Consult OT/PT to assist with strengthening/mobility   - Keep Call bell within reach  - Keep bed low and locked with side rails adjusted as appropriate  - Keep care items and personal belongings within reach  - Initiate and maintain comfort rounds  - Make Fall Risk Sign visible to staff  - Offer Toileting every  Hours, in advance of need  - Initiate/Maintain alarm  - Obtain necessary fall risk management equipment:   - Apply yellow socks and bracelet for high fall risk patients  - Consider moving patient to room near nurses station  Outcome: Progressing  Goal: Maintain or return to baseline ADL function  Description: INTERVENTIONS:  -  Assess patient's ability to carry out ADLs; assess patient's baseline for ADL function and identify physical deficits which impact ability to perform ADLs (bathing, care of mouth/teeth, toileting, grooming, dressing, etc.)  - Assess/evaluate cause of self-care deficits   - Assess range of motion  - Assess patient's mobility; develop plan if impaired  - Assess patient's need for assistive devices and provide as appropriate  - Encourage maximum independence but intervene and supervise when necessary  - Involve family in performance of ADLs  - Assess for home care needs following discharge   - Consider OT consult to assist with ADL evaluation and planning for discharge  - Provide patient education as appropriate  Outcome: Progressing  Goal: Maintains/Returns to pre admission functional level  Description: INTERVENTIONS:  - Perform AM-PAC 6 Click Basic Mobility/ Daily Activity  assessment daily.  - Set and communicate daily mobility goal to care team and patient/family/caregiver.   - Collaborate with rehabilitation services on mobility goals if consulted  - Perform Range of Motion  times a day.  - Reposition patient every  hours.  - Dangle patient  times a day  - Stand patient  times a day  - Ambulate patient  times a day  - Out of bed to chair  times a day   - Out of bed for meals  times a day  - Out of bed for toileting  - Record patient progress and toleration of activity level   Outcome: Progressing     Problem: DISCHARGE PLANNING  Goal: Discharge to home or other facility with appropriate resources  Description: INTERVENTIONS:  - Identify barriers to discharge w/patient and caregiver  - Arrange for needed discharge resources and transportation as appropriate  - Identify discharge learning needs (meds, wound care, etc.)  - Arrange for interpretive services to assist at discharge as needed  - Refer to Case Management Department for coordinating discharge planning if the patient needs post-hospital services based on physician/advanced practitioner order or complex needs related to functional status, cognitive ability, or social support system  Outcome: Progressing     Problem: Knowledge Deficit  Goal: Patient/family/caregiver demonstrates understanding of disease process, treatment plan, medications, and discharge instructions  Description: Complete learning assessment and assess knowledge base.  Interventions:  - Provide teaching at level of understanding  - Provide teaching via preferred learning methods  Outcome: Progressing     Problem: Nutrition/Hydration-ADULT  Goal: Nutrient/Hydration intake appropriate for improving, restoring or maintaining nutritional needs  Description: Monitor and assess patient's nutrition/hydration status for malnutrition. Collaborate with interdisciplinary team and initiate plan and interventions as ordered.  Monitor patient's weight and dietary intake as  ordered or per policy. Utilize nutrition screening tool and intervene as necessary. Determine patient's food preferences and provide high-protein, high-caloric foods as appropriate.     INTERVENTIONS:  - Monitor oral intake, urinary output, labs, and treatment plans  - Assess nutrition and hydration status and recommend course of action  - Evaluate amount of meals eaten  - Assist patient with eating if necessary   - Allow adequate time for meals  - Recommend/ encourage appropriate diets, oral nutritional supplements, and vitamin/mineral supplements  - Order, calculate, and assess calorie counts as needed  - Recommend, monitor, and adjust tube feedings and TPN/PPN based on assessed needs  - Assess need for intravenous fluids  - Provide specific nutrition/hydration education as appropriate  - Include patient/family/caregiver in decisions related to nutrition  Outcome: Progressing

## 2024-08-21 NOTE — ASSESSMENT & PLAN NOTE
POA, evidenced by fever, tachycardia, elevated lactic acid. Most likely secondary to urinary tract infection vs pneumonia vs both   Lactic acid normalized   Fever curve trended down   HR improved   Procalcitonin elevated   Continue Rocephin   Follow up urine culture   Doubtful sputum culture will be obtainable   Trend procalcitonin   Continue fluids and monitor

## 2024-08-21 NOTE — ASSESSMENT & PLAN NOTE
Patient chronically has had dysphagia and was scheduled to get up PEG tube placed however was not able to achieve that by GI  She has surgery follow up planned   Follow up with General Surgery as planned  Continue current diet with modifications   Consult speech therapist.

## 2024-08-21 NOTE — ASSESSMENT & PLAN NOTE
Patient has hypoxic respiratory failure acute due to suspected pneumonia on CXR  Weaned to room air today   COVID/Flu negative   Continue with IV ceftriaxone.  Cannot rule out aspiration pneumonia which could be present on admission due to lethargy and weakness.  Patient chronically has dysphagia as well.  Follow culture data.    Trend procalcitonin.  Monitor O2 demand

## 2024-08-21 NOTE — PLAN OF CARE
Cont puree diet w/ honey thick liquids by tsp  Meds crushed  Encourage po  Aspiration precautions   Will follow up x 1-2 as needed.

## 2024-08-22 PROBLEM — E43 SEVERE PROTEIN-CALORIE MALNUTRITION (HCC): Status: ACTIVE | Noted: 2024-08-22

## 2024-08-22 LAB
ANION GAP SERPL CALCULATED.3IONS-SCNC: 9 MMOL/L (ref 4–13)
BACTERIA UR CULT: ABNORMAL
BUN SERPL-MCNC: 18 MG/DL (ref 5–25)
CALCIUM SERPL-MCNC: 9.1 MG/DL (ref 8.4–10.2)
CHLORIDE SERPL-SCNC: 105 MMOL/L (ref 96–108)
CO2 SERPL-SCNC: 27 MMOL/L (ref 21–32)
CREAT SERPL-MCNC: 0.82 MG/DL (ref 0.6–1.3)
ERYTHROCYTE [DISTWIDTH] IN BLOOD BY AUTOMATED COUNT: 13.3 % (ref 11.6–15.1)
GFR SERPL CREATININE-BSD FRML MDRD: 76 ML/MIN/1.73SQ M
GLUCOSE SERPL-MCNC: 214 MG/DL (ref 65–140)
HCT VFR BLD AUTO: 30.5 % (ref 34.8–46.1)
HGB BLD-MCNC: 10.1 G/DL (ref 11.5–15.4)
MCH RBC QN AUTO: 33.1 PG (ref 26.8–34.3)
MCHC RBC AUTO-ENTMCNC: 33.1 G/DL (ref 31.4–37.4)
MCV RBC AUTO: 100 FL (ref 82–98)
PLATELET # BLD AUTO: 119 THOUSANDS/UL (ref 149–390)
PMV BLD AUTO: 11.8 FL (ref 8.9–12.7)
POTASSIUM SERPL-SCNC: 4 MMOL/L (ref 3.5–5.3)
PROCALCITONIN SERPL-MCNC: 7.11 NG/ML
RBC # BLD AUTO: 3.05 MILLION/UL (ref 3.81–5.12)
SODIUM SERPL-SCNC: 141 MMOL/L (ref 135–147)
VALPROATE FREE SERPL-MCNC: 6.2 UG/ML (ref 6–22)
WBC # BLD AUTO: 4.22 THOUSAND/UL (ref 4.31–10.16)

## 2024-08-22 PROCEDURE — 80048 BASIC METABOLIC PNL TOTAL CA: CPT | Performed by: PHYSICIAN ASSISTANT

## 2024-08-22 PROCEDURE — 85027 COMPLETE CBC AUTOMATED: CPT | Performed by: PHYSICIAN ASSISTANT

## 2024-08-22 PROCEDURE — 99232 SBSQ HOSP IP/OBS MODERATE 35: CPT | Performed by: PHYSICIAN ASSISTANT

## 2024-08-22 PROCEDURE — 84145 PROCALCITONIN (PCT): CPT | Performed by: PHYSICIAN ASSISTANT

## 2024-08-22 RX ORDER — LIDOCAINE HYDROCHLORIDE 20 MG/ML
JELLY TOPICAL ONCE
Status: DISCONTINUED | OUTPATIENT
Start: 2024-08-22 | End: 2024-08-23 | Stop reason: HOSPADM

## 2024-08-22 RX ADMIN — BACLOFEN 10 MG: 10 TABLET ORAL at 21:50

## 2024-08-22 RX ADMIN — GLYCERIN 2 DROP: .002; .002; .01 SOLUTION/ DROPS OPHTHALMIC at 10:10

## 2024-08-22 RX ADMIN — BACLOFEN 10 MG: 10 TABLET ORAL at 10:10

## 2024-08-22 RX ADMIN — SODIUM CHLORIDE 75 ML/HR: 0.9 INJECTION, SOLUTION INTRAVENOUS at 00:21

## 2024-08-22 RX ADMIN — CARBIDOPA AND LEVODOPA 1 TABLET: 25; 100 TABLET, EXTENDED RELEASE ORAL at 06:35

## 2024-08-22 RX ADMIN — CEFTRIAXONE SODIUM 1000 MG: 10 INJECTION, POWDER, FOR SOLUTION INTRAVENOUS at 10:11

## 2024-08-22 RX ADMIN — ENOXAPARIN SODIUM 40 MG: 40 INJECTION SUBCUTANEOUS at 10:10

## 2024-08-22 RX ADMIN — POLYETHYLENE GLYCOL 3350 17 G: 17 POWDER, FOR SOLUTION ORAL at 10:10

## 2024-08-22 RX ADMIN — LEVOTHYROXINE SODIUM 25 MCG: 25 TABLET ORAL at 06:35

## 2024-08-22 RX ADMIN — CARBIDOPA AND LEVODOPA 1 TABLET: 25; 100 TABLET, EXTENDED RELEASE ORAL at 17:09

## 2024-08-22 RX ADMIN — PANTOPRAZOLE SODIUM 20 MG: 20 TABLET, DELAYED RELEASE ORAL at 06:31

## 2024-08-22 RX ADMIN — CITALOPRAM HYDROBROMIDE 20 MG: 20 TABLET ORAL at 10:10

## 2024-08-22 RX ADMIN — ARIPIPRAZOLE 2 MG: 2 TABLET ORAL at 21:50

## 2024-08-22 RX ADMIN — BACLOFEN 10 MG: 10 TABLET ORAL at 17:09

## 2024-08-22 RX ADMIN — GLYCERIN 2 DROP: .002; .002; .01 SOLUTION/ DROPS OPHTHALMIC at 17:10

## 2024-08-22 NOTE — ASSESSMENT & PLAN NOTE
Malnutrition Findings:   Adult Malnutrition type: Chronic illness  Adult Degree of Malnutrition: Other severe protein calorie malnutrition  Malnutrition Characteristics: Inadequate energy, Weight loss                  360 Statement: Chronic/severe malnutrition r/t condition, as evidenced by intake meeting <75% of estimated needs x > 1 month, and 11% wt loss x 5 months (8/13/24: 88#, 3/12/24: 99#). Treatment: add oral nutrition supplements. Planning for EN, recommendations provided  She will be following up with General Surgery for hopeful feeding tube placement   BMI Findings:  Adult BMI Classifications: Underweight < 18.5        There is no height or weight on file to calculate BMI.      Time: 1:18 AM EDT  Date of encounter:  2023  Independent Historian/Clinical History and Information was obtained by:   Family  Chief Complaint: Cough, congestion    History is limited by: Age    History of Present Illness:  Patient is a 4 m.o. year old female who presents to the emergency department for evaluation of cough and congestion onset 2 days ago. Patient mother reports today she started coughing and sounded like she was choking.  She had no color change.  Per reports she initially had mild retractions that has since resolved.  She has been tolerating p.o. intake.  She has had normal amount of wet diapers.  She was born full-term with no complications.  Immunizations are up-to-date.  HPI    Patient Care Team  Primary Care Provider: Provider, No Known    Past Medical History:     No Known Allergies  History reviewed. No pertinent past medical history.  History reviewed. No pertinent surgical history.  Family History   Problem Relation Age of Onset   • Hypertension Maternal Grandfather         Copied from mother's family history at birth   • Sleep apnea Maternal Grandfather         Copied from mother's family history at birth   • Hyperlipidemia Maternal Grandmother         Copied from mother's family history at birth   • Restless legs syndrome Maternal Grandmother         Copied from mother's family history at birth   • Hypertension Maternal Grandmother         Copied from mother's family history at birth   • Sleep apnea Maternal Grandmother         Copied from mother's family history at birth   • No Known Problems Brother         Copied from mother's family history at birth   • No Known Problems Sister         Copied from mother's family history at birth   • Asthma Mother         Copied from mother's history at birth       Home Medications:  Prior to Admission medications    Not on File        Social History:          Review of Systems:  Review of Systems   Constitutional: Negative for appetite change,  decreased responsiveness and fever.   HENT: Positive for congestion. Negative for ear discharge and nosebleeds.    Eyes: Negative for redness.   Respiratory: Positive for cough. Negative for stridor.    Cardiovascular: Negative for cyanosis.   Gastrointestinal: Negative for blood in stool, diarrhea and vomiting.   Genitourinary: Negative for decreased urine volume and hematuria.   Musculoskeletal: Negative for joint swelling.   Skin: Negative for pallor.   Neurological: Negative for seizures.   Hematological: Negative for adenopathy.   All other systems reviewed and are negative.       Physical Exam:  Pulse 151   Temp 98.7 °F (37.1 °C) (Rectal)   Resp (!) 28   Wt 5570 g (12 lb 4.5 oz)   SpO2 96%     Physical Exam  Vitals and nursing note reviewed.   Constitutional:       General: She is active. She is not in acute distress.     Appearance: Normal appearance. She is not toxic-appearing.   HENT:      Head: Normocephalic and atraumatic. Anterior fontanelle is flat.      Right Ear: Tympanic membrane is erythematous and bulging.      Nose: Nose normal.      Mouth/Throat:      Mouth: Mucous membranes are moist.   Eyes:      Extraocular Movements: Extraocular movements intact.      Pupils: Pupils are equal, round, and reactive to light.   Cardiovascular:      Rate and Rhythm: Normal rate and regular rhythm.      Pulses: Normal pulses.      Heart sounds: Normal heart sounds.   Pulmonary:      Effort: Pulmonary effort is normal.      Breath sounds: Normal breath sounds.   Abdominal:      General: Abdomen is flat.      Palpations: Abdomen is soft.      Tenderness: There is no abdominal tenderness.   Musculoskeletal:         General: No swelling. Normal range of motion.      Cervical back: Normal range of motion.   Skin:     General: Skin is warm.      Turgor: Normal.   Neurological:      Mental Status: She is alert.                  Procedures:  Procedures      Medical Decision Making:      Comorbidities that affect  care:    None    External Notes reviewed:    Previous Admission Note: Reviewed delivery note.  Patient was born at 30 weeks 2 days with no complication was able to go home with her mother.      The following orders were placed and all results were independently analyzed by me:  Orders Placed This Encounter   Procedures   • RSV Screen - Wash, Nasopharynx   • Influenza Antigen, Rapid - Swab, Nasopharynx   • COVID-19,APTIMA PANTHER(ELYSIA),[facility], NP/OP SWAB IN UTM/VTM/SALINE TRANSPORT MEDIA,24 HR TAT - Swab, Nasal Cavity   • XR Chest 1 View       Medications Given in the Emergency Department:  Medications - No data to display     ED Course:         Labs:    Lab Results (last 24 hours)     Procedure Component Value Units Date/Time    Influenza Antigen, Rapid - Swab, Nasopharynx [[id number]]  (Normal) Collected: 05/23/23 2301    Specimen: Swab from Nasopharynx Updated: 05/23/23 2335     Influenza A Ag, EIA Negative     Influenza B Ag, EIA Negative    COVID-19,APTIMA PANTHER(ELYSIA),[facility], NP/OP SWAB IN UTM/VTM/SALINE TRANSPORT MEDIA,24 HR TAT - Swab, Nasal Cavity [[id number]] Collected: 05/23/23 2301    Specimen: Swab from Nasal Cavity Updated: 05/23/23 2304    RSV Screen - Wash, Nasopharynx [[id number]]  (Normal) Collected: 05/23/23 2302    Specimen: Wash from Nasopharynx Updated: 05/23/23 2335     RSV Rapid Ag Negative           Imaging:    XR Chest 1 View    Result Date: 2023  PROCEDURE: XR CHEST 1 VW  COMPARISON: None.  INDICATIONS: COUGH; SHORTNESS OF BREATH.  FINDINGS: A single frontal (AP or PA upright portable) chest radiograph reveals no cardiothymic enlargement and no acute infiltrate. No pneumothorax is seen.  External artifacts obscure detail.  The imaged airway is patent.  The patient was shielded for the exam.       No acute cardiopulmonary disease is seen radiographically.     Please note that portions of this note were completed with a voice recognition program.  AMRITA SOMMER JR, MD        Electronically Signed and Approved By: AMRITA SOMMER JR, MD on 2023 at 0:49                  Differential Diagnosis and Discussion:    Cough: Differential diagnosis includes but is not limited to pneumonia, acute bronchitis, upper respiratory infection, ACE inhibitor use, allergic reaction, epiglottitis, seasonal allergies, chemical irritants, exercise-induced asthma, viral syndrome.    All labs were reviewed and interpreted by me.    MDM  Number of Diagnoses or Management Options  Acute otitis media, unspecified otitis media type  Upper respiratory tract infection, unspecified type  Diagnosis management comments: Patient is afebrile nontoxic-appearing.  Vital signs are stable.  At time of evaluation she is comfortably sleeping in her mother's arms.  Lungs are clear.  X-ray showed no acute finding.  Flu and COVID both negative.  On exam she does appear to have right otitis media.  Will treat with antibiotic.  Recommend close follow-up with her pediatrician.  Discussed return precautions, discharge instructions and answered all their questions.       Amount and/or Complexity of Data Reviewed  Clinical lab tests: reviewed  Tests in the radiology section of CPT®: reviewed    Risk of Complications, Morbidity, and/or Mortality  Presenting problems: low  Management options: low             Patient Care Considerations:    LABS: I considered ordering labs, however patient appears well and labs not indicated at this time      Consultants/Shared Management Plan:    None    Social Determinants of Health:    Patient has presented with family members who are responsible, reliable and will ensure follow up care.      Disposition and Care Coordination:    Discharged: The patient is suitable and stable for discharge with no need for consideration of observation or admission.    The patient was evaluated in the emergency department. The patient is well-appearing. The patient is able to tolerate po intake in the emergency  department. The patient´s vital signs have been stable. On re-examination the patient does not appear toxic, has no meningeal signs, has no intractable vomiting, no respiratory distress and no apparent pain.  The caretaker was counseled to return to the ER for uncontrollable fever, intractable vomiting, excessive crying, altered mental status, decreased po intake, or any signs of distress that they may perceive. Caretaker was counseled to return at any time for any concerns that they may have. The caretaker will pursue further outpatient evaluation with the primary care physician or other designated or consultant physician as indicated in the discharge instructions.  I have explained discharge medications and the need for follow up with the patient/caretakers. This was also printed in the discharge instructions. Patient was discharged with the following medications and follow up:      Medication List      New Prescriptions    amoxicillin 400 MG/5ML suspension  Commonly known as: AMOXIL  Take 3.1 mL by mouth 2 (Two) Times a Day for 10 days.           Where to Get Your Medications      These medications were sent to [facility]/pharmacy #[id number] - [place], [place] - [address] - [phone] [facility] [phone]   [address] [place] [address]    Hours: 24-hours Phone: [phone]   · amoxicillin 400 MG/5ML suspension      Provider, No Known  [facility]  [place] [address]    In 2 days         Final diagnoses:   Acute otitis media, unspecified otitis media type   Upper respiratory tract infection, unspecified type        ED Disposition     ED Disposition   Discharge    Condition   Stable    Comment   --             This medical record created using voice recognition software.       Mick Elmore Jr.  05/24/23 0132       Sarbjit Silverio MD  05/24/23 0204

## 2024-08-22 NOTE — PLAN OF CARE
Problem: Prexisting or High Potential for Compromised Skin Integrity  Goal: Skin integrity is maintained or improved  Description: INTERVENTIONS:  - Identify patients at risk for skin breakdown  - Assess and monitor skin integrity  - Assess and monitor nutrition and hydration status  - Monitor labs   - Assess for incontinence   - Turn and reposition patient  - Assist with mobility/ambulation  - Relieve pressure over bony prominences  - Avoid friction and shearing  - Provide appropriate hygiene as needed including keeping skin clean and dry  - Evaluate need for skin moisturizer/barrier cream  - Collaborate with interdisciplinary team   - Patient/family teaching  - Consider wound care consult   Outcome: Progressing     Problem: PAIN - ADULT  Goal: Verbalizes/displays adequate comfort level or baseline comfort level  Description: Interventions:  - Encourage patient to monitor pain and request assistance  - Assess pain using appropriate pain scale  - Administer analgesics based on type and severity of pain and evaluate response  - Implement non-pharmacological measures as appropriate and evaluate response  - Consider cultural and social influences on pain and pain management  - Notify physician/advanced practitioner if interventions unsuccessful or patient reports new pain  Outcome: Progressing     Problem: INFECTION - ADULT  Goal: Absence or prevention of progression during hospitalization  Description: INTERVENTIONS:  - Assess and monitor for signs and symptoms of infection  - Monitor lab/diagnostic results  - Monitor all insertion sites, i.e. indwelling lines, tubes, and drains  - Monitor endotracheal if appropriate and nasal secretions for changes in amount and color  - [place] appropriate cooling/warming therapies per order  - Administer medications as ordered  - Instruct and encourage patient and family to use good hand hygiene technique  - Identify and instruct in appropriate isolation precautions for  identified infection/condition  Outcome: Progressing  Goal: Absence of fever/infection during neutropenic period  Description: INTERVENTIONS:  - Monitor WBC    Outcome: Progressing     Problem: SAFETY ADULT  Goal: Patient will remain free of falls  Description: INTERVENTIONS:  - Educate patient/family on patient safety including physical limitations  - Instruct patient to call for assistance with activity   - Consult OT/PT to assist with strengthening/mobility   - Keep Call bell within reach  - Keep bed low and locked with side rails adjusted as appropriate  - Keep care items and personal belongings within reach  - Initiate and maintain comfort rounds  - Make Fall Risk Sign visible to staff  - Offer Toileting every  Hours, in advance of need  - Initiate/Maintain alarm  - Obtain necessary fall risk management equipment:   - Apply yellow socks and bracelet for high fall risk patients  - Consider moving patient to room near nurses station  Outcome: Progressing  Goal: Maintain or return to baseline ADL function  Description: INTERVENTIONS:  -  Assess patient's ability to carry out ADLs; assess patient's baseline for ADL function and identify physical deficits which impact ability to perform ADLs (bathing, care of mouth/teeth, toileting, grooming, dressing, etc.)  - Assess/evaluate cause of self-care deficits   - Assess range of motion  - Assess patient's mobility; develop plan if impaired  - Assess patient's need for assistive devices and provide as appropriate  - Encourage maximum independence but intervene and supervise when necessary  - Involve family in performance of ADLs  - Assess for home care needs following discharge   - Consider OT consult to assist with ADL evaluation and planning for discharge  - Provide patient education as appropriate  Outcome: Progressing  Goal: Maintains/Returns to pre admission functional level  Description: INTERVENTIONS:  - Perform AM-PAC 6 Click Basic Mobility/ Daily Activity  assessment daily.  - Set and communicate daily mobility goal to care team and patient/family/caregiver.   - Collaborate with rehabilitation services on mobility goals if consulted  - Perform Range of Motion  times a day.  - Reposition patient every  hours.  - Dangle patient  times a day  - Stand patient  times a day  - Ambulate patient times a day  - Out of bed to chair  times a day   - Out of bed for meals  times a day  - Out of bed for toileting  - Record patient progress and toleration of activity level   Outcome: Progressing     Problem: DISCHARGE PLANNING  Goal: Discharge to home or other facility with appropriate resources  Description: INTERVENTIONS:  - Identify barriers to discharge w/patient and caregiver  - Arrange for needed discharge resources and transportation as appropriate  - Identify discharge learning needs (meds, wound care, etc.)  - Arrange for interpretive services to assist at discharge as needed  - Refer to Case Management Department for coordinating discharge planning if the patient needs post-hospital services based on physician/advanced practitioner order or complex needs related to functional status, cognitive ability, or social support system  Outcome: Progressing     Problem: Knowledge Deficit  Goal: Patient/family/caregiver demonstrates understanding of disease process, treatment plan, medications, and discharge instructions  Description: Complete learning assessment and assess knowledge base.  Interventions:  - Provide teaching at level of understanding  - Provide teaching via preferred learning methods  Outcome: Progressing     Problem: Nutrition/Hydration-ADULT  Goal: Nutrient/Hydration intake appropriate for improving, restoring or maintaining nutritional needs  Description: Monitor and assess patient's nutrition/hydration status for malnutrition. Collaborate with interdisciplinary team and initiate plan and interventions as ordered.  Monitor patient's weight and dietary intake as  ordered or per policy. Utilize nutrition screening tool and intervene as necessary. Determine patient's food preferences and provide high-protein, high-caloric foods as appropriate.     INTERVENTIONS:  - Monitor oral intake, urinary output, labs, and treatment plans  - Assess nutrition and hydration status and recommend course of action  - Evaluate amount of meals eaten  - Assist patient with eating if necessary   - Allow adequate time for meals  - Recommend/ encourage appropriate diets, oral nutritional supplements, and vitamin/mineral supplements  - Order, calculate, and assess calorie counts as needed  - Recommend, monitor, and adjust tube feedings and TPN/PPN based on assessed needs  - Assess need for intravenous fluids  - Provide specific nutrition/hydration education as appropriate  - Include patient/family/caregiver in decisions related to nutrition  Outcome: Progressing

## 2024-08-22 NOTE — PLAN OF CARE
Problem: Prexisting or High Potential for Compromised Skin Integrity  Goal: Skin integrity is maintained or improved  Description: INTERVENTIONS:  - Identify patients at risk for skin breakdown  - Assess and monitor skin integrity  - Assess and monitor nutrition and hydration status  - Monitor labs   - Assess for incontinence   - Turn and reposition patient  - Assist with mobility/ambulation  - Relieve pressure over bony prominences  - Avoid friction and shearing  - Provide appropriate hygiene as needed including keeping skin clean and dry  - Evaluate need for skin moisturizer/barrier cream  - Collaborate with interdisciplinary team   - Patient/family teaching  - Consider wound care consult   Outcome: Progressing     Problem: PAIN - ADULT  Goal: Verbalizes/displays adequate comfort level or baseline comfort level  Description: Interventions:  - Encourage patient to monitor pain and request assistance  - Assess pain using appropriate pain scale  - Administer analgesics based on type and severity of pain and evaluate response  - Implement non-pharmacological measures as appropriate and evaluate response  - Consider cultural and social influences on pain and pain management  - Notify physician/advanced practitioner if interventions unsuccessful or patient reports new pain  Outcome: Progressing     Problem: INFECTION - ADULT  Goal: Absence or prevention of progression during hospitalization  Description: INTERVENTIONS:  - Assess and monitor for signs and symptoms of infection  - Monitor lab/diagnostic results  - Monitor all insertion sites, i.e. indwelling lines, tubes, and drains  - Monitor endotracheal if appropriate and nasal secretions for changes in amount and color  - [place] appropriate cooling/warming therapies per order  - Administer medications as ordered  - Instruct and encourage patient and family to use good hand hygiene technique  - Identify and instruct in appropriate isolation precautions for  identified infection/condition  Outcome: Progressing  Goal: Absence of fever/infection during neutropenic period  Description: INTERVENTIONS:  - Monitor WBC    Outcome: Progressing     Problem: SAFETY ADULT  Goal: Patient will remain free of falls  Description: INTERVENTIONS:  - Educate patient/family on patient safety including physical limitations  - Instruct patient to call for assistance with activity   - Consult OT/PT to assist with strengthening/mobility   - Keep Call bell within reach  - Keep bed low and locked with side rails adjusted as appropriate  - Keep care items and personal belongings within reach  - Initiate and maintain comfort rounds  - Make Fall Risk Sign visible to staff  - Offer Toileting every  Hours, in advance of need  - Initiate/Maintain alarm  - Obtain necessary fall risk management equipment:   - Apply yellow socks and bracelet for high fall risk patients  - Consider moving patient to room near nurses station  Outcome: Progressing  Goal: Maintain or return to baseline ADL function  Description: INTERVENTIONS:  -  Assess patient's ability to carry out ADLs; assess patient's baseline for ADL function and identify physical deficits which impact ability to perform ADLs (bathing, care of mouth/teeth, toileting, grooming, dressing, etc.)  - Assess/evaluate cause of self-care deficits   - Assess range of motion  - Assess patient's mobility; develop plan if impaired  - Assess patient's need for assistive devices and provide as appropriate  - Encourage maximum independence but intervene and supervise when necessary  - Involve family in performance of ADLs  - Assess for home care needs following discharge   - Consider OT consult to assist with ADL evaluation and planning for discharge  - Provide patient education as appropriate  Outcome: Progressing  Goal: Maintains/Returns to pre admission functional level  Description: INTERVENTIONS:  - Perform AM-PAC 6 Click Basic Mobility/ Daily Activity  assessment daily.  - Set and communicate daily mobility goal to care team and patient/family/caregiver.   - Collaborate with rehabilitation services on mobility goals if consulted  - Perform Range of Motion  times a day.  - Reposition patient every  hours.  - Dangle patient  times a day  - Stand patient  times a day  - Ambulate patient  times a day  - Out of bed to chair  times a day   - Out of bed for meals  times a day  - Out of bed for toileting  - Record patient progress and toleration of activity level   Outcome: Progressing     Problem: DISCHARGE PLANNING  Goal: Discharge to home or other facility with appropriate resources  Description: INTERVENTIONS:  - Identify barriers to discharge w/patient and caregiver  - Arrange for needed discharge resources and transportation as appropriate  - Identify discharge learning needs (meds, wound care, etc.)  - Arrange for interpretive services to assist at discharge as needed  - Refer to Case Management Department for coordinating discharge planning if the patient needs post-hospital services based on physician/advanced practitioner order or complex needs related to functional status, cognitive ability, or social support system  Outcome: Progressing     Problem: Knowledge Deficit  Goal: Patient/family/caregiver demonstrates understanding of disease process, treatment plan, medications, and discharge instructions  Description: Complete learning assessment and assess knowledge base.  Interventions:  - Provide teaching at level of understanding  - Provide teaching via preferred learning methods  Outcome: Progressing     Problem: Nutrition/Hydration-ADULT  Goal: Nutrient/Hydration intake appropriate for improving, restoring or maintaining nutritional needs  Description: Monitor and assess patient's nutrition/hydration status for malnutrition. Collaborate with interdisciplinary team and initiate plan and interventions as ordered.  Monitor patient's weight and dietary intake as  ordered or per policy. Utilize nutrition screening tool and intervene as necessary. Determine patient's food preferences and provide high-protein, high-caloric foods as appropriate.     INTERVENTIONS:  - Monitor oral intake, urinary output, labs, and treatment plans  - Assess nutrition and hydration status and recommend course of action  - Evaluate amount of meals eaten  - Assist patient with eating if necessary   - Allow adequate time for meals  - Recommend/ encourage appropriate diets, oral nutritional supplements, and vitamin/mineral supplements  - Order, calculate, and assess calorie counts as needed  - Recommend, monitor, and adjust tube feedings and TPN/PPN based on assessed needs  - Assess need for intravenous fluids  - Provide specific nutrition/hydration education as appropriate  - Include patient/family/caregiver in decisions related to nutrition  Outcome: Progressing

## 2024-08-22 NOTE — ASSESSMENT & PLAN NOTE
POA, evidenced by fever, tachycardia, elevated lactic acid. Most likely secondary to urinary tract infection vs pneumonia vs both   Lactic acid normalized   Fever curve trended down   HR improved   Procalcitonin elevated, trending down   UCx with E.coli  Continue Rocephin   Follow up urine culture sensitivities   Doubtful sputum culture will be obtainable   Trend procalcitonin   Continue fluids and monitor

## 2024-08-22 NOTE — ASSESSMENT & PLAN NOTE
Patient lives in a group home  Bedbound and wheelchair-bound and dependent for all her ADLs.  Return to group home at discharge

## 2024-08-22 NOTE — PROGRESS NOTES
[facility]  Progress Note  Name: Terrie Alicea I  MRN: [id number]  Unit/Bed#: W -01 I Date of Admission: 8/20/2024   Date of Service: 8/22/2024 I Hospital Day: 2    Assessment & Plan   * Severe sepsis (HCC)  Assessment & Plan  POA, evidenced by fever, tachycardia, elevated lactic acid. Most likely secondary to urinary tract infection vs pneumonia vs both   Lactic acid normalized   Fever curve trended down   HR improved   Procalcitonin elevated, trending down   UCx with E.coli  Continue Rocephin   Follow up urine culture sensitivities   Doubtful sputum culture will be obtainable   Trend procalcitonin   Continue fluids and monitor       Acute metabolic encephalopathy  Assessment & Plan  Most likely secondary to severe sepsis from UTI vs pneumonia, possibly due to medication error on Monday   Chest x-ray with early RLL infiltrate   Mental status improved   Continue IV ceftriaxone  Follow-up culture data.  Holding Depakote for now pending level   Change patient's trazodone to 50 mg nightly as needed insomnia  Discontinue Ativan nightly until patient mental status much better.  Discussed this with the patient's routine caregiver Lisa and she is in agreement with this plan of care.    Acute respiratory failure with hypoxia (HCC)  Assessment & Plan  Patient has hypoxic respiratory failure acute due to suspected pneumonia on CXR  Weaned to room air today   COVID/Flu negative   Continue with IV ceftriaxone.  Cannot rule out aspiration pneumonia which could be present on admission due to lethargy and weakness.  Patient chronically has dysphagia as well.  Follow culture data.    Trend procalcitonin.  Monitor O2 demand     Dystonic cerebral palsy (HCC)  Assessment & Plan  Noted, appears at baseline   Continue patient on Sinemet  Continue patient on baclofen.    Functional quadriplegia (HCC)  Assessment & Plan  Continue patient on baclofen due to spasticity.    Bipolar disorder  ([facility])  Assessment & Plan  Patient inadvertently got another resident's Depakote on Monday   NH3 normal   Holding Depakote for now   Follow VPA level     Severe Intellectual disability  Assessment & Plan  Patient lives in a group home  Bedbound and wheelchair-bound and dependent for all her ADLs.  Return to group home at discharge       Type 2 diabetes mellitus, without long-term current use of insulin ([facility])  Assessment & Plan  Lab Results   Component Value Date    HGBA1C 6.2 (H) 08/13/2024       Recent Labs     08/21/24  0429   POCGLU 99         Blood Sugar Average: Last 72 hrs:  (P) 99  Prior A1c noted and acceptable   Hold home regimen   Diabetic diet   QID accuchecks       Dysphagia  Assessment & Plan  Patient chronically has had dysphagia and was scheduled to get up PEG tube placed however was not able to achieve that by GI  She has surgery follow up planned   Follow up with General Surgery as planned  Continue current diet with modifications   Consult speech therapist.    Severe protein-calorie malnutrition ([facility])  Assessment & Plan  Malnutrition Findings:   Adult Malnutrition type: Chronic illness  Adult Degree of Malnutrition: Other severe protein calorie malnutrition  Malnutrition Characteristics: Inadequate energy, Weight loss                  360 Statement: Chronic/severe malnutrition r/t condition, as evidenced by intake meeting <75% of estimated needs x > 1 month, and 11% wt loss x 5 months (8/13/24: 88#, 3/12/24: 99#). Treatment: add oral nutrition supplements. Planning for EN, recommendations provided  She will be following up with General Surgery for hopeful feeding tube placement   BMI Findings:  Adult BMI Classifications: Underweight < 18.5        There is no height or weight on file to calculate BMI.                  VTE Pharmacologic Prophylaxis: VTE Score: 3 Moderate Risk (Score 3-4) - Pharmacological DVT Prophylaxis Ordered: enoxaparin (Lovenox).    Mobility:   Basic Mobility Inpatient Raw Score:  6  JH-HLM Goal: 2: Bed activities/Dependent transfer  JH-HLM Achieved: 2: Bed activities/Dependent transfer  JH-HLM Goal achieved. Continue to encourage appropriate mobility.    Patient Centered Rounds: I performed bedside rounds with nursing staff today.   Discussions with Specialists or Other Care Team Provider: Discussed with RNKOBY    Education and Discussions with Family / Patient: Updated  (caregiver) via phone.    Total Time Spent on Date of Encounter in care of patient: 35 mins. This time was spent on one or more of the following: performing physical exam; counseling and coordination of care; obtaining or reviewing history; documenting in the medical record; reviewing/ordering tests, medications or procedures; communicating with other healthcare professionals and discussing with patient's family/caregivers.    Current Length of Stay: 2 day(s)  Current Patient Status: Inpatient   Certification Statement: The patient will continue to require additional inpatient hospital stay due to monitoring culture data   Discharge Plan: Anticipate discharge tomorrow to [facility].    Code Status: Level 1 - Full Code    Subjective:   Patient unable to provide history.     Objective:     Vitals:   Temp (24hrs), Av.7 °F (37.6 °C), Min:99.3 °F (37.4 °C), Max:100 °F (37.8 °C)    Temp:  [99.3 °F (37.4 °C)-100 °F (37.8 °C)] 100 °F (37.8 °C)  HR:  [83-95] 88  Resp:  [16-20] 17  BP: (114-116)/(61-64) 116/64  SpO2:  [89 %-93 %] 93 %  There is no height or weight on file to calculate BMI.     Input and Output Summary (last 24 hours):     Intake/Output Summary (Last 24 hours) at 2024 1148  Last data filed at 2024 0717  Gross per 24 hour   Intake 100 ml   Output --   Net 100 ml       Physical Exam:   Physical Exam  Constitutional:       General: She is not in acute distress.     Appearance: Normal appearance. She is normal weight. She is not ill-appearing or diaphoretic.   HENT:      Head: Normocephalic and  atraumatic.      Mouth/Throat:      Mouth: Mucous membranes are moist.   Eyes:      General: No scleral icterus.     Pupils: Pupils are equal, round, and reactive to light.   Cardiovascular:      Rate and Rhythm: Normal rate and regular rhythm.      Pulses: Normal pulses.      Heart sounds: Normal heart sounds, S1 normal and S2 normal. No murmur heard.     No systolic murmur is present.      No diastolic murmur is present.      No gallop. No S3 or S4 sounds.   Pulmonary:      Effort: Pulmonary effort is normal. No accessory muscle usage or respiratory distress.      Breath sounds: Normal breath sounds. No stridor. No decreased breath sounds, wheezing, rhonchi or rales.   Chest:      Chest wall: No tenderness.   Abdominal:      General: Bowel sounds are normal. There is no distension.      Palpations: Abdomen is soft.      Tenderness: There is no abdominal tenderness. There is no guarding.   Musculoskeletal:      Right lower leg: No edema.      Left lower leg: No edema.   Skin:     General: Skin is warm and dry.      Coloration: Skin is not jaundiced.   Neurological:      Mental Status: She is alert. Mental status is at baseline.      Motor: Tremor present. No seizure activity.   Psychiatric:         Behavior: Behavior is cooperative.          Additional Data:     Labs:  Results from last 7 days   Lab Units 08/22/24  0808 08/21/24  0442   WBC Thousand/uL 4.22* 6.21   HEMOGLOBIN g/dL 10.1* 9.8*   HEMATOCRIT % 30.5* 31.5*   PLATELETS Thousands/uL 119* 106*   SEGS PCT %  --  63   LYMPHO PCT %  --  25   MONO PCT %  --  9   EOS PCT %  --  2     Results from last 7 days   Lab Units 08/22/24  0808 08/21/24  0442   SODIUM mmol/L 141 141   POTASSIUM mmol/L 4.0 4.0   CHLORIDE mmol/L 105 105   CO2 mmol/L 27 31   BUN mg/dL 18 22   CREATININE mg/dL 0.82 0.89   ANION GAP mmol/L 9 5   CALCIUM mg/dL 9.1 9.6   ALBUMIN g/dL  --  3.4*   TOTAL BILIRUBIN mg/dL  --  0.19*   ALK PHOS U/L  --  64   ALT U/L  --  53*   AST U/L  --  45*    GLUCOSE RANDOM mg/dL 214* 116     Results from last 7 days   Lab Units 08/20/24  1349   INR  0.94     Results from last 7 days   Lab Units 08/21/24  0429   POC GLUCOSE mg/dl 99         Results from last 7 days   Lab Units 08/22/24  0808 08/21/24  0442 08/20/24  1727 08/20/24  1425 08/20/24  1239   LACTIC ACID mmol/L  --  1.1 2.3* 2.8* 3.9*   PROCALCITONIN ng/ml 7.11* 13.29*  --   --  1.80*       Lines/Drains:  Invasive Devices       Peripheral Intravenous Line  Duration             Peripheral IV 08/20/24 Proximal;Right;Ventral (anterior) Forearm 1 day                          Imaging: No pertinent imaging reviewed.    Recent Cultures (last 7 days):   Results from last 7 days   Lab Units 08/20/24  1303 08/20/24  1239   BLOOD CULTURE   --  No Growth at 24 hrs.  No Growth at 24 hrs.   URINE CULTURE  >100,000 cfu/ml Escherichia coli*  --        Last 24 Hours Medication List:   Current Facility-Administered Medications   Medication Dose Route Frequency Provider Last Rate    acetaminophen  650 mg Oral Q4H PRN Jennifer Hernandez MD      albuterol  2.5 mg Nebulization Q4H PRN Jennifer Hernandez MD      ARIPiprazole  2 mg Oral HS Jennifer Hernandez MD      Artificial Tears  2 drop Both Eyes BID Jennifer Hernandez MD      baclofen  10 mg Oral TID Jennifer Hernandez MD      carbidopa-levodopa  1 tablet Oral BID Jennifer Hernandez MD      cefTRIAXone  1,000 mg Intravenous Q24H Jennifer Hernandez MD 1,000 mg (08/22/24 1011)    citalopram  20 mg Oral Daily Jennifer Hernandez MD      enoxaparin  40 mg Subcutaneous Daily Jennifer Hernandez MD      levothyroxine  25 mcg Oral Early Morning Jennifer Hernandez MD      ondansetron  4 mg Intravenous Q6H PRN Jennifer Hernandez MD      pantoprazole  20 mg Oral Early Morning Jennifer Hernandez MD      polyethylene glycol  17 g Oral Daily Jennifer Hernandez MD      traZODone  50 mg Per G Tube HS PRN Jennifer Hernandez MD          Today, Patient Was Seen By:  Aniket Whatley PA-C    **Please Note: This note may have been constructed using a voice recognition system.**

## 2024-08-23 ENCOUNTER — TELEPHONE (OUTPATIENT)
Dept: FAMILY MEDICINE CLINIC | Facility: CLINIC | Age: 64
End: 2024-08-23

## 2024-08-23 VITALS
OXYGEN SATURATION: 93 % | SYSTOLIC BLOOD PRESSURE: 129 MMHG | RESPIRATION RATE: 16 BRPM | TEMPERATURE: 99.7 F | DIASTOLIC BLOOD PRESSURE: 67 MMHG | HEART RATE: 86 BPM

## 2024-08-23 LAB
ANION GAP SERPL CALCULATED.3IONS-SCNC: 9 MMOL/L (ref 4–13)
BUN SERPL-MCNC: 18 MG/DL (ref 5–25)
CALCIUM SERPL-MCNC: 9.6 MG/DL (ref 8.4–10.2)
CHLORIDE SERPL-SCNC: 105 MMOL/L (ref 96–108)
CO2 SERPL-SCNC: 26 MMOL/L (ref 21–32)
CREAT SERPL-MCNC: 0.93 MG/DL (ref 0.6–1.3)
ERYTHROCYTE [DISTWIDTH] IN BLOOD BY AUTOMATED COUNT: 13.1 % (ref 11.6–15.1)
GFR SERPL CREATININE-BSD FRML MDRD: 66 ML/MIN/1.73SQ M
GLUCOSE SERPL-MCNC: 182 MG/DL (ref 65–140)
GLUCOSE SERPL-MCNC: 229 MG/DL (ref 65–140)
GLUCOSE SERPL-MCNC: 296 MG/DL (ref 65–140)
GLUCOSE SERPL-MCNC: 399 MG/DL (ref 65–140)
GLUCOSE SERPL-MCNC: 430 MG/DL (ref 65–140)
HCT VFR BLD AUTO: 30.4 % (ref 34.8–46.1)
HGB BLD-MCNC: 10 G/DL (ref 11.5–15.4)
MCH RBC QN AUTO: 32.6 PG (ref 26.8–34.3)
MCHC RBC AUTO-ENTMCNC: 32.9 G/DL (ref 31.4–37.4)
MCV RBC AUTO: 99 FL (ref 82–98)
PLATELET # BLD AUTO: 156 THOUSANDS/UL (ref 149–390)
PMV BLD AUTO: 12.2 FL (ref 8.9–12.7)
POTASSIUM SERPL-SCNC: 4.2 MMOL/L (ref 3.5–5.3)
PROCALCITONIN SERPL-MCNC: 3.98 NG/ML
RBC # BLD AUTO: 3.07 MILLION/UL (ref 3.81–5.12)
SODIUM SERPL-SCNC: 140 MMOL/L (ref 135–147)
WBC # BLD AUTO: 4.03 THOUSAND/UL (ref 4.31–10.16)

## 2024-08-23 PROCEDURE — 80048 BASIC METABOLIC PNL TOTAL CA: CPT | Performed by: PHYSICIAN ASSISTANT

## 2024-08-23 PROCEDURE — 99239 HOSP IP/OBS DSCHRG MGMT >30: CPT | Performed by: PHYSICIAN ASSISTANT

## 2024-08-23 PROCEDURE — 85027 COMPLETE CBC AUTOMATED: CPT | Performed by: PHYSICIAN ASSISTANT

## 2024-08-23 PROCEDURE — 84145 PROCALCITONIN (PCT): CPT | Performed by: PHYSICIAN ASSISTANT

## 2024-08-23 PROCEDURE — 82948 REAGENT STRIP/BLOOD GLUCOSE: CPT

## 2024-08-23 RX ORDER — GLIMEPIRIDE 2 MG/1
1 TABLET ORAL
Status: DISCONTINUED | OUTPATIENT
Start: 2024-08-23 | End: 2024-08-23 | Stop reason: HOSPADM

## 2024-08-23 RX ORDER — CEFPODOXIME PROXETIL 200 MG/1
200 TABLET, FILM COATED ORAL 2 TIMES DAILY
Qty: 6 TABLET | Refills: 0 | Status: ON HOLD | OUTPATIENT
Start: 2024-08-23 | End: 2024-08-26

## 2024-08-23 RX ORDER — GLIMEPIRIDE 2 MG/1
1 TABLET ORAL
Status: DISCONTINUED | OUTPATIENT
Start: 2024-08-24 | End: 2024-08-23

## 2024-08-23 RX ORDER — VALACYCLOVIR HYDROCHLORIDE 500 MG/1
1000 TABLET, FILM COATED ORAL EVERY 12 HOURS SCHEDULED
Status: DISCONTINUED | OUTPATIENT
Start: 2024-08-23 | End: 2024-08-23 | Stop reason: HOSPADM

## 2024-08-23 RX ORDER — VALACYCLOVIR HYDROCHLORIDE 1 G/1
1000 TABLET, FILM COATED ORAL EVERY 12 HOURS SCHEDULED
Qty: 2 TABLET | Refills: 0 | Status: ON HOLD | OUTPATIENT
Start: 2024-08-23 | End: 2024-08-24

## 2024-08-23 RX ADMIN — GLIMEPIRIDE 1 MG: 2 TABLET ORAL at 11:43

## 2024-08-23 RX ADMIN — CITALOPRAM HYDROBROMIDE 20 MG: 20 TABLET ORAL at 09:21

## 2024-08-23 RX ADMIN — PANTOPRAZOLE SODIUM 20 MG: 20 TABLET, DELAYED RELEASE ORAL at 06:17

## 2024-08-23 RX ADMIN — CEFTRIAXONE SODIUM 1000 MG: 10 INJECTION, POWDER, FOR SOLUTION INTRAVENOUS at 09:46

## 2024-08-23 RX ADMIN — BACLOFEN 10 MG: 10 TABLET ORAL at 09:21

## 2024-08-23 RX ADMIN — INSULIN HUMAN 10 UNITS: 100 INJECTION, SOLUTION PARENTERAL at 09:31

## 2024-08-23 RX ADMIN — ENOXAPARIN SODIUM 40 MG: 40 INJECTION SUBCUTANEOUS at 09:21

## 2024-08-23 RX ADMIN — METFORMIN HYDROCHLORIDE 1000 MG: 500 TABLET ORAL at 11:43

## 2024-08-23 RX ADMIN — GLYCERIN 2 DROP: .002; .002; .01 SOLUTION/ DROPS OPHTHALMIC at 09:21

## 2024-08-23 RX ADMIN — POLYETHYLENE GLYCOL 3350 17 G: 17 POWDER, FOR SOLUTION ORAL at 09:21

## 2024-08-23 RX ADMIN — CARBIDOPA AND LEVODOPA 1 TABLET: 25; 100 TABLET, EXTENDED RELEASE ORAL at 06:17

## 2024-08-23 RX ADMIN — VALACYCLOVIR 1000 MG: 500 TABLET, FILM COATED ORAL at 13:26

## 2024-08-23 RX ADMIN — LEVOTHYROXINE SODIUM 25 MCG: 25 TABLET ORAL at 06:17

## 2024-08-23 NOTE — ASSESSMENT & PLAN NOTE
Lab Results   Component Value Date    HGBA1C 6.2 (H) 08/13/2024       Recent Labs     08/23/24  1003 08/23/24  1142 08/23/24  1218 08/23/24  1318   POCGLU 399* 296* 229* 182*         Blood Sugar Average: Last 72 hrs:  (P) 241  Prior A1c noted and acceptable   Hyperglycemic morning of discharge, improved with regular insulin   Restart home regimen

## 2024-08-23 NOTE — ASSESSMENT & PLAN NOTE
Patient has hypoxic respiratory failure acute due to suspected pneumonia on CXR  Weaned to room air today   COVID/Flu negative   Antibiotics as above

## 2024-08-23 NOTE — TELEPHONE ENCOUNTER
Labs done as a result of the extra doses of medications have resulted and the depakote level is fine    I see she is currently in the hospital for severe UTI infection

## 2024-08-23 NOTE — PLAN OF CARE
Problem: Prexisting or High Potential for Compromised Skin Integrity  Goal: Skin integrity is maintained or improved  Description: INTERVENTIONS:  - Identify patients at risk for skin breakdown  - Assess and monitor skin integrity  - Assess and monitor nutrition and hydration status  - Monitor labs   - Assess for incontinence   - Turn and reposition patient  - Assist with mobility/ambulation  - Relieve pressure over bony prominences  - Avoid friction and shearing  - Provide appropriate hygiene as needed including keeping skin clean and dry  - Evaluate need for skin moisturizer/barrier cream  - Collaborate with interdisciplinary team   - Patient/family teaching  - Consider wound care consult   Outcome: Progressing     Problem: PAIN - ADULT  Goal: Verbalizes/displays adequate comfort level or baseline comfort level  Description: Interventions:  - Encourage patient to monitor pain and request assistance  - Assess pain using appropriate pain scale  - Administer analgesics based on type and severity of pain and evaluate response  - Implement non-pharmacological measures as appropriate and evaluate response  - Consider cultural and social influences on pain and pain management  - Notify physician/advanced practitioner if interventions unsuccessful or patient reports new pain  Outcome: Progressing     Problem: INFECTION - ADULT  Goal: Absence or prevention of progression during hospitalization  Description: INTERVENTIONS:  - Assess and monitor for signs and symptoms of infection  - Monitor lab/diagnostic results  - Monitor all insertion sites, i.e. indwelling lines, tubes, and drains  - Monitor endotracheal if appropriate and nasal secretions for changes in amount and color  - [place] appropriate cooling/warming therapies per order  - Administer medications as ordered  - Instruct and encourage patient and family to use good hand hygiene technique  - Identify and instruct in appropriate isolation precautions for  identified infection/condition  Outcome: Progressing  Goal: Absence of fever/infection during neutropenic period  Description: INTERVENTIONS:  - Monitor WBC    Outcome: Progressing     Problem: SAFETY ADULT  Goal: Patient will remain free of falls  Description: INTERVENTIONS:  - Educate patient/family on patient safety including physical limitations  - Instruct patient to call for assistance with activity   - Consult OT/PT to assist with strengthening/mobility   - Keep Call bell within reach  - Keep bed low and locked with side rails adjusted as appropriate  - Keep care items and personal belongings within reach  - Initiate and maintain comfort rounds  - Make Fall Risk Sign visible to staff  - Offer Toileting every  Hours, in advance of need  - Initiate/Maintain alarm  - Obtain necessary fall risk management equipment:   - Apply yellow socks and bracelet for high fall risk patients  - Consider moving patient to room near nurses station  Outcome: Progressing  Goal: Maintain or return to baseline ADL function  Description: INTERVENTIONS:  -  Assess patient's ability to carry out ADLs; assess patient's baseline for ADL function and identify physical deficits which impact ability to perform ADLs (bathing, care of mouth/teeth, toileting, grooming, dressing, etc.)  - Assess/evaluate cause of self-care deficits   - Assess range of motion  - Assess patient's mobility; develop plan if impaired  - Assess patient's need for assistive devices and provide as appropriate  - Encourage maximum independence but intervene and supervise when necessary  - Involve family in performance of ADLs  - Assess for home care needs following discharge   - Consider OT consult to assist with ADL evaluation and planning for discharge  - Provide patient education as appropriate  Outcome: Progressing  Goal: Maintains/Returns to pre admission functional level  Description: INTERVENTIONS:  - Perform AM-PAC 6 Click Basic Mobility/ Daily Activity  assessment daily.  - Set and communicate daily mobility goal to care team and patient/family/caregiver.   - Collaborate with rehabilitation services on mobility goals if consulted  - Perform Range of Motion  times a day.  - Reposition patient every  hours.  - Dangle patient  times a day  - Stand patient  times a day  - Ambulate patient  times a day  - Out of bed to chair  times a day   - Out of bed for meals  times a day  - Out of bed for toileting  - Record patient progress and toleration of activity level   Outcome: Progressing     Problem: DISCHARGE PLANNING  Goal: Discharge to home or other facility with appropriate resources  Description: INTERVENTIONS:  - Identify barriers to discharge w/patient and caregiver  - Arrange for needed discharge resources and transportation as appropriate  - Identify discharge learning needs (meds, wound care, etc.)  - Arrange for interpretive services to assist at discharge as needed  - Refer to Case Management Department for coordinating discharge planning if the patient needs post-hospital services based on physician/advanced practitioner order or complex needs related to functional status, cognitive ability, or social support system  Outcome: Progressing     Problem: Knowledge Deficit  Goal: Patient/family/caregiver demonstrates understanding of disease process, treatment plan, medications, and discharge instructions  Description: Complete learning assessment and assess knowledge base.  Interventions:  - Provide teaching at level of understanding  - Provide teaching via preferred learning methods  Outcome: Progressing     Problem: Nutrition/Hydration-ADULT  Goal: Nutrient/Hydration intake appropriate for improving, restoring or maintaining nutritional needs  Description: Monitor and assess patient's nutrition/hydration status for malnutrition. Collaborate with interdisciplinary team and initiate plan and interventions as ordered.  Monitor patient's weight and dietary intake as  ordered or per policy. Utilize nutrition screening tool and intervene as necessary. Determine patient's food preferences and provide high-protein, high-caloric foods as appropriate.     INTERVENTIONS:  - Monitor oral intake, urinary output, labs, and treatment plans  - Assess nutrition and hydration status and recommend course of action  - Evaluate amount of meals eaten  - Assist patient with eating if necessary   - Allow adequate time for meals  - Recommend/ encourage appropriate diets, oral nutritional supplements, and vitamin/mineral supplements  - Order, calculate, and assess calorie counts as needed  - Recommend, monitor, and adjust tube feedings and TPN/PPN based on assessed needs  - Assess need for intravenous fluids  - Provide specific nutrition/hydration education as appropriate  - Include patient/family/caregiver in decisions related to nutrition  Outcome: Progressing

## 2024-08-23 NOTE — ASSESSMENT & PLAN NOTE
Patient inadvertently got another resident's Depakote on Monday   NH3 normal   VPA level normal   Restart Depakote, further discussion with group home staff and PCP

## 2024-08-23 NOTE — ASSESSMENT & PLAN NOTE
Most likely secondary to severe sepsis from UTI vs pneumonia, possibly due to medication error on Monday   Chest x-ray with early RLL infiltrate   Mental status improved   Antibiotics as above   Can restart Depakote as her outpatient level was normal   Defer further discussion to PCP/group home team   Restart the rest of her home regimen

## 2024-08-23 NOTE — DISCHARGE SUMMARY
[facility]  Discharge- Terrie Alicea 1960, 64 y.o. female MRN: [id number]  Unit/Bed#: W -01 Encounter: [id number]  Primary Care Provider: Gunnar Sylvester DO   Date and time admitted to hospital: 8/20/2024 12:13 PM    * Severe sepsis (HCC)  Assessment & Plan  POA, evidenced by fever, tachycardia, elevated lactic acid. Most likely secondary to urinary tract infection vs pneumonia vs both   Lactic acid normalized   Fever curve trended down   HR improved   Procalcitonin elevated, trending down   UCx with E.coli  Stable for discharge   Continue Vantin for a total course of 7 days which will cover both UTI and pneumonia if was present     Acute metabolic encephalopathy  Assessment & Plan  Most likely secondary to severe sepsis from UTI vs pneumonia, possibly due to medication error on Monday   Chest x-ray with early RLL infiltrate   Mental status improved   Antibiotics as above   Can restart Depakote as her outpatient level was normal   Defer further discussion to PCP/group home team   Restart the rest of her home regimen     Acute respiratory failure with hypoxia (HCC)-resolved as of 8/23/2024  Assessment & Plan  Patient has hypoxic respiratory failure acute due to suspected pneumonia on CXR  Weaned to room air today   COVID/Flu negative   Antibiotics as above    Dystonic cerebral palsy (HCC)  Assessment & Plan  Noted, appears at baseline   Continue patient on Sinemet  Continue patient on baclofen.    Functional quadriplegia (HCC)  Assessment & Plan  Continue patient on baclofen due to spasticity.    Bipolar disorder (HCC)  Assessment & Plan  Patient inadvertently got another resident's Depakote on Monday   NH3 normal   VPA level normal   Restart Depakote, further discussion with group home staff and PCP     Severe Intellectual disability  Assessment & Plan  Patient lives in a group home  Bedbound and wheelchair-bound and dependent for all her ADLs.  Return to group home at discharge        Type 2 diabetes mellitus, without long-term current use of insulin (HCC)  Assessment & Plan  Lab Results   Component Value Date    HGBA1C 6.2 (H) 08/13/2024       Recent Labs     08/23/24  1003 08/23/24  1142 08/23/24  1218 08/23/24  1318   POCGLU 399* 296* 229* 182*         Blood Sugar Average: Last 72 hrs:  (P) 241  Prior A1c noted and acceptable   Hyperglycemic morning of discharge, improved with regular insulin   Restart home regimen         Dysphagia  Assessment & Plan  Patient chronically has had dysphagia and was scheduled to get up PEG tube placed however was not able to achieve that by GI  She has surgery follow up planned   Follow up with General Surgery as planned  Continue current diet with modifications   Consult speech therapist.    Severe protein-calorie malnutrition (HCC)  Assessment & Plan  Malnutrition Findings:   Adult Malnutrition type: Chronic illness  Adult Degree of Malnutrition: Other severe protein calorie malnutrition  Malnutrition Characteristics: Inadequate energy, Weight loss                  360 Statement: Chronic/severe malnutrition r/t condition, as evidenced by intake meeting <75% of estimated needs x > 1 month, and 11% wt loss x 5 months (8/13/24: 88#, 3/12/24: 99#). Treatment: add oral nutrition supplements. Planning for EN, recommendations provided  She will be following up with General Surgery for hopeful feeding tube placement   BMI Findings:  Adult BMI Classifications: Underweight < 18.5        There is no height or weight on file to calculate BMI.           Medical Problems       Resolved Problems  Date Reviewed: 8/23/2024            Resolved    Acute respiratory failure with hypoxia (HCC) 8/23/2024     Resolved by  Aniket Whatley PA-C        Discharging Physician / Practitioner: Aniket Whatley PA-C  PCP: Gunnar Sylvester DO  Admission Date:   Admission Orders (From admission, onward)       Ordered        08/20/24 1447  INPATIENT ADMISSION  Once                           Discharge Date: 08/23/24    Consultations During Hospital Stay:  None    Procedures Performed:   CXR    Significant Findings / Test Results:   CXR: Development of right basal groundglass opacity consistent with infiltrate. Low lung volume     Incidental Findings:   None     Test Results Pending at Discharge (will require follow up):   None     Outpatient Tests Requested:  None    Complications:  None    Reason for Admission: Acute Encephalopathy, Severe Sepsis     Hospital Course:   Terrie Alicea is a 64 y.o. female patient who originally presented to the hospital on 8/20/2024 due to mental status change. Patient met SIRS criteria in the ER with fever and tachycardia. She was suspected to have either a UTI and or Pneumonia and had elevated lactic acid making her severe sepsis. She has low BP at baseline. She was admitted, fluids were given, IV Rocephin was started, and culture data was obtained. With antibiotics and IV fluids her mental status improved to her baseline which is essentially having her eyes open and looking around. Her urine culture grew E. Coli which was sensitive to cephalosporin antibiotic. She is being discharged on Vantin to continue coverage for urine and pneumonia pathogens. Due to baseline mental status sputum culture was unobtainable. She was found to have oral herpes and will be treated with Valacyclovir for 2 doses for acute infection. She was hyperglycemic day of discharge and was treated with insulin x 1 and her home oral regimen was restarted. She was discharged back to her group home in stable condition     Hospital Course: No notes on file        Please see above list of diagnoses and related plan for additional information.     Condition at Discharge: stable    Discharge Day Visit / Exam:   Subjective:  Patient at baseline day of discharge.   Vitals: Blood Pressure: 129/67 (08/23/24 0727)  Pulse: 86 (08/23/24 0727)  Temperature: 99.7 °F (37.6 °C) (08/23/24 0727)  Temp Source:  Axillary (08/21/24 0719)  Respirations: 16 (08/23/24 0727)  SpO2: 93 % (08/23/24 0727)  Exam:   Physical Exam  Constitutional:       General: She is not in acute distress.     Appearance: Normal appearance. She is normal weight. She is not ill-appearing or diaphoretic.   HENT:      Head: Normocephalic and atraumatic.      Mouth/Throat:      Lips: Lesions present.      Mouth: Mucous membranes are moist.   Eyes:      General: No scleral icterus.     Pupils: Pupils are equal, round, and reactive to light.   Cardiovascular:      Rate and Rhythm: Normal rate and regular rhythm.      Pulses: Normal pulses.      Heart sounds: Normal heart sounds, S1 normal and S2 normal. No murmur heard.     No systolic murmur is present.      No diastolic murmur is present.      No gallop. No S3 or S4 sounds.   Pulmonary:      Effort: Pulmonary effort is normal. No accessory muscle usage or respiratory distress.      Breath sounds: Normal breath sounds. No stridor. No decreased breath sounds, wheezing, rhonchi or rales.   Chest:      Chest wall: No tenderness.   Abdominal:      General: Bowel sounds are normal. There is no distension.      Palpations: Abdomen is soft.      Tenderness: There is no abdominal tenderness. There is no guarding.   Musculoskeletal:      Right lower leg: No edema.      Left lower leg: No edema.   Skin:     General: Skin is warm and dry.      Coloration: Skin is not jaundiced.   Neurological:      General: No focal deficit present.      Mental Status: She is alert. Mental status is at baseline.      Motor: Tremor present. No seizure activity.          Discussion with Family: Updated  (caregiver) at bedside.    Discharge instructions/Information to patient and family:   See after visit summary for information provided to patient and family.      Provisions for Follow-Up Care:  See after visit summary for information related to follow-up care and any pertinent home health orders.      Mobility at time  of Discharge:   Basic Mobility Inpatient Raw Score: 6  JH-HLM Goal: 2: Bed activities/Dependent transfer  JH-HLM Achieved: 2: Bed activities/Dependent transfer  HLM Goal achieved. Continue to encourage appropriate mobility.     Disposition:   Other: group home     Planned Readmission: None     Discharge Statement:  I spent 45 minutes discharging the patient. This time was spent on the day of discharge. I had direct contact with the patient on the day of discharge. Greater than 50% of the total time was spent examining patient, answering all patient questions, arranging and discussing plan of care with patient as well as directly providing post-discharge instructions.  Additional time then spent on discharge activities.    Discharge Medications:  See after visit summary for reconciled discharge medications provided to patient and/or family.      **Please Note: This note may have been constructed using a voice recognition system**

## 2024-08-23 NOTE — TREATMENT PLAN
Discussed with charge RN. Prefer to trial patient off Depakote, therefore will discontinue as this has already been held during this hospitalization without ill effect.     Aniket Whatley Jr., PA-C

## 2024-08-23 NOTE — ASSESSMENT & PLAN NOTE
Malnutrition Findings:   Adult Malnutrition type: Chronic illness  Adult Degree of Malnutrition: Other severe protein calorie malnutrition  Malnutrition Characteristics: Inadequate energy, Weight loss                  360 Statement: Chronic/severe malnutrition r/t condition, as evidenced by intake meeting <75% of estimated needs x > 1 month, and 11% wt loss x 5 months (8/13/24: 88#, 3/12/24: 99#). Treatment: add oral nutrition supplements. Planning for EN, recommendations provided  She will be following up with General Surgery for hopeful feeding tube placement   BMI Findings:  Adult BMI Classifications: Underweight < 18.5        There is no height or weight on file to calculate BMI.

## 2024-08-23 NOTE — ASSESSMENT & PLAN NOTE
POA, evidenced by fever, tachycardia, elevated lactic acid. Most likely secondary to urinary tract infection vs pneumonia vs both   Lactic acid normalized   Fever curve trended down   HR improved   Procalcitonin elevated, trending down   UCx with E.coli  Stable for discharge   Continue Vantin for a total course of 7 days which will cover both UTI and pneumonia if was present

## 2024-08-24 ENCOUNTER — HOSPITAL ENCOUNTER (INPATIENT)
Facility: HOSPITAL | Age: 64
LOS: 26 days | Discharge: NURSING, REHAB OR LONG-TERM CARE | DRG: 391 | End: 2024-09-19
Attending: EMERGENCY MEDICINE | Admitting: INTERNAL MEDICINE
Payer: MEDICARE

## 2024-08-24 ENCOUNTER — APPOINTMENT (EMERGENCY)
Dept: RADIOLOGY | Facility: HOSPITAL | Age: 64
DRG: 391 | End: 2024-08-24
Payer: MEDICARE

## 2024-08-24 ENCOUNTER — APPOINTMENT (EMERGENCY)
Dept: CT IMAGING | Facility: HOSPITAL | Age: 64
DRG: 391 | End: 2024-08-24
Payer: MEDICARE

## 2024-08-24 DIAGNOSIS — R73.9 HYPERGLYCEMIA: ICD-10-CM

## 2024-08-24 DIAGNOSIS — E03.9 HYPOTHYROIDISM, UNSPECIFIED TYPE: ICD-10-CM

## 2024-08-24 DIAGNOSIS — E08.10 DIABETES MELLITUS DUE TO UNDERLYING CONDITION WITH KETOACIDOSIS WITHOUT COMA, WITH LONG-TERM CURRENT USE OF INSULIN (HCC): ICD-10-CM

## 2024-08-24 DIAGNOSIS — F31.9 BIPOLAR DISORDER (HCC): ICD-10-CM

## 2024-08-24 DIAGNOSIS — R65.20 SEVERE SEPSIS (HCC): Primary | ICD-10-CM

## 2024-08-24 DIAGNOSIS — Z79.4 DIABETES MELLITUS DUE TO UNDERLYING CONDITION WITH KETOACIDOSIS WITHOUT COMA, WITH LONG-TERM CURRENT USE OF INSULIN (HCC): ICD-10-CM

## 2024-08-24 DIAGNOSIS — E11.9 DIABETES MELLITUS TYPE 2, NONINSULIN DEPENDENT (HCC): ICD-10-CM

## 2024-08-24 DIAGNOSIS — I95.9 HYPOTENSION: ICD-10-CM

## 2024-08-24 DIAGNOSIS — E11.9 TYPE 2 DIABETES MELLITUS WITHOUT COMPLICATION, WITH LONG-TERM CURRENT USE OF INSULIN (HCC): ICD-10-CM

## 2024-08-24 DIAGNOSIS — E43 SEVERE PROTEIN-CALORIE MALNUTRITION (HCC): ICD-10-CM

## 2024-08-24 DIAGNOSIS — Z79.4 TYPE 2 DIABETES MELLITUS WITHOUT COMPLICATION, WITH LONG-TERM CURRENT USE OF INSULIN (HCC): ICD-10-CM

## 2024-08-24 DIAGNOSIS — R05.9 COUGH: ICD-10-CM

## 2024-08-24 DIAGNOSIS — A41.9 SEVERE SEPSIS (HCC): Primary | ICD-10-CM

## 2024-08-24 DIAGNOSIS — R93.5 ABNORMAL CT OF THE ABDOMEN: ICD-10-CM

## 2024-08-24 DIAGNOSIS — K21.9 GASTROESOPHAGEAL REFLUX DISEASE: ICD-10-CM

## 2024-08-24 DIAGNOSIS — N39.0 RECURRENT UTI: ICD-10-CM

## 2024-08-24 DIAGNOSIS — F99 PSYCHIATRIC DIAGNOSIS: ICD-10-CM

## 2024-08-24 DIAGNOSIS — E46 PROTEIN-CALORIE MALNUTRITION, UNSPECIFIED SEVERITY (HCC): ICD-10-CM

## 2024-08-24 DIAGNOSIS — T17.908A ASPIRATION INTO RESPIRATORY TRACT, INITIAL ENCOUNTER: ICD-10-CM

## 2024-08-24 DIAGNOSIS — R13.12 OROPHARYNGEAL DYSPHAGIA: ICD-10-CM

## 2024-08-24 DIAGNOSIS — D50.9 IRON DEFICIENCY ANEMIA: ICD-10-CM

## 2024-08-24 DIAGNOSIS — R63.8 DECREASED ORAL INTAKE: ICD-10-CM

## 2024-08-24 DIAGNOSIS — E78.5 HYPERLIPIDEMIA, UNSPECIFIED HYPERLIPIDEMIA TYPE: ICD-10-CM

## 2024-08-24 DIAGNOSIS — N30.90 CYSTITIS: ICD-10-CM

## 2024-08-24 DIAGNOSIS — G20.C PARKINSONISM, UNSPECIFIED PARKINSONISM TYPE (HCC): ICD-10-CM

## 2024-08-24 PROBLEM — G20.A1 PARKINSON'S DISEASE: Status: ACTIVE | Noted: 2022-01-21

## 2024-08-24 LAB
ALBUMIN SERPL BCG-MCNC: 3.4 G/DL (ref 3.5–5)
ALP SERPL-CCNC: 88 U/L (ref 34–104)
ALT SERPL W P-5'-P-CCNC: 49 U/L (ref 7–52)
ANION GAP SERPL CALCULATED.3IONS-SCNC: 4 MMOL/L (ref 4–13)
AST SERPL W P-5'-P-CCNC: 17 U/L (ref 13–39)
BASOPHILS # BLD AUTO: 0.03 THOUSANDS/ΜL (ref 0–0.1)
BASOPHILS NFR BLD AUTO: 1 % (ref 0–1)
BILIRUB SERPL-MCNC: 0.28 MG/DL (ref 0.2–1)
BUN SERPL-MCNC: 16 MG/DL (ref 5–25)
CALCIUM ALBUM COR SERPL-MCNC: 10 MG/DL (ref 8.3–10.1)
CALCIUM SERPL-MCNC: 9.5 MG/DL (ref 8.4–10.2)
CHLORIDE SERPL-SCNC: 102 MMOL/L (ref 96–108)
CO2 SERPL-SCNC: 34 MMOL/L (ref 21–32)
CREAT SERPL-MCNC: 0.96 MG/DL (ref 0.6–1.3)
EOSINOPHIL # BLD AUTO: 0.14 THOUSAND/ΜL (ref 0–0.61)
EOSINOPHIL NFR BLD AUTO: 2 % (ref 0–6)
ERYTHROCYTE [DISTWIDTH] IN BLOOD BY AUTOMATED COUNT: 13 % (ref 11.6–15.1)
GFR SERPL CREATININE-BSD FRML MDRD: 63 ML/MIN/1.73SQ M
GLUCOSE SERPL-MCNC: 264 MG/DL (ref 65–140)
GLUCOSE SERPL-MCNC: 427 MG/DL (ref 65–140)
HCT VFR BLD AUTO: 30.5 % (ref 34.8–46.1)
HGB BLD-MCNC: 10 G/DL (ref 11.5–15.4)
IMM GRANULOCYTES # BLD AUTO: 0.03 THOUSAND/UL (ref 0–0.2)
IMM GRANULOCYTES NFR BLD AUTO: 1 % (ref 0–2)
LYMPHOCYTES # BLD AUTO: 1.15 THOUSANDS/ΜL (ref 0.6–4.47)
LYMPHOCYTES NFR BLD AUTO: 20 % (ref 14–44)
MCH RBC QN AUTO: 32.6 PG (ref 26.8–34.3)
MCHC RBC AUTO-ENTMCNC: 32.8 G/DL (ref 31.4–37.4)
MCV RBC AUTO: 99 FL (ref 82–98)
MONOCYTES # BLD AUTO: 0.52 THOUSAND/ΜL (ref 0.17–1.22)
MONOCYTES NFR BLD AUTO: 9 % (ref 4–12)
NEUTROPHILS # BLD AUTO: 3.98 THOUSANDS/ΜL (ref 1.85–7.62)
NEUTS SEG NFR BLD AUTO: 67 % (ref 43–75)
NRBC BLD AUTO-RTO: 0 /100 WBCS
PLATELET # BLD AUTO: 153 THOUSANDS/UL (ref 149–390)
PMV BLD AUTO: 11.1 FL (ref 8.9–12.7)
POTASSIUM SERPL-SCNC: 4.7 MMOL/L (ref 3.5–5.3)
PROCALCITONIN SERPL-MCNC: 1.66 NG/ML
PROT SERPL-MCNC: 6.4 G/DL (ref 6.4–8.4)
RBC # BLD AUTO: 3.07 MILLION/UL (ref 3.81–5.12)
SODIUM SERPL-SCNC: 140 MMOL/L (ref 135–147)
WBC # BLD AUTO: 5.85 THOUSAND/UL (ref 4.31–10.16)

## 2024-08-24 PROCEDURE — 71260 CT THORAX DX C+: CPT

## 2024-08-24 PROCEDURE — 99285 EMERGENCY DEPT VISIT HI MDM: CPT

## 2024-08-24 PROCEDURE — 74177 CT ABD & PELVIS W/CONTRAST: CPT

## 2024-08-24 PROCEDURE — 80053 COMPREHEN METABOLIC PANEL: CPT | Performed by: EMERGENCY MEDICINE

## 2024-08-24 PROCEDURE — 70491 CT SOFT TISSUE NECK W/DYE: CPT

## 2024-08-24 PROCEDURE — 71045 X-RAY EXAM CHEST 1 VIEW: CPT

## 2024-08-24 PROCEDURE — 96374 THER/PROPH/DIAG INJ IV PUSH: CPT

## 2024-08-24 PROCEDURE — 36415 COLL VENOUS BLD VENIPUNCTURE: CPT | Performed by: EMERGENCY MEDICINE

## 2024-08-24 PROCEDURE — 84145 PROCALCITONIN (PCT): CPT

## 2024-08-24 PROCEDURE — 99223 1ST HOSP IP/OBS HIGH 75: CPT | Performed by: INTERNAL MEDICINE

## 2024-08-24 PROCEDURE — 85025 COMPLETE CBC W/AUTO DIFF WBC: CPT | Performed by: EMERGENCY MEDICINE

## 2024-08-24 PROCEDURE — 82948 REAGENT STRIP/BLOOD GLUCOSE: CPT

## 2024-08-24 RX ORDER — LORAZEPAM 0.5 MG/1
0.5 TABLET ORAL DAILY
Status: DISCONTINUED | OUTPATIENT
Start: 2024-08-24 | End: 2024-08-26

## 2024-08-24 RX ORDER — ACETAMINOPHEN 160 MG/5ML
650 SUSPENSION ORAL EVERY 4 HOURS PRN
Status: DISCONTINUED | OUTPATIENT
Start: 2024-08-24 | End: 2024-09-02

## 2024-08-24 RX ORDER — CEFPODOXIME PROXETIL 200 MG/1
200 TABLET, FILM COATED ORAL ONCE
Status: COMPLETED | OUTPATIENT
Start: 2024-08-24 | End: 2024-08-24

## 2024-08-24 RX ORDER — MINERAL OIL AND WHITE PETROLATUM 150; 830 MG/G; MG/G
0.5 OINTMENT OPHTHALMIC
Status: DISCONTINUED | OUTPATIENT
Start: 2024-08-24 | End: 2024-08-24

## 2024-08-24 RX ORDER — FERROUS SULFATE 300 MG/5ML
300 LIQUID (ML) ORAL
Status: DISCONTINUED | OUTPATIENT
Start: 2024-08-25 | End: 2024-09-19 | Stop reason: HOSPADM

## 2024-08-24 RX ORDER — BENZONATATE 100 MG/1
100 CAPSULE ORAL 3 TIMES DAILY PRN
Status: DISCONTINUED | OUTPATIENT
Start: 2024-08-24 | End: 2024-08-26

## 2024-08-24 RX ORDER — INSULIN GLARGINE 100 [IU]/ML
5 INJECTION, SOLUTION SUBCUTANEOUS
Status: DISCONTINUED | OUTPATIENT
Start: 2024-08-24 | End: 2024-08-25

## 2024-08-24 RX ORDER — INSULIN LISPRO 100 [IU]/ML
10 INJECTION, SOLUTION INTRAVENOUS; SUBCUTANEOUS ONCE
Status: COMPLETED | OUTPATIENT
Start: 2024-08-24 | End: 2024-08-25

## 2024-08-24 RX ORDER — LORATADINE ORAL 5 MG/5ML
10 SOLUTION ORAL DAILY PRN
Status: DISCONTINUED | OUTPATIENT
Start: 2024-08-24 | End: 2024-09-19 | Stop reason: HOSPADM

## 2024-08-24 RX ORDER — BACLOFEN 10 MG/1
10 TABLET ORAL 3 TIMES DAILY
Status: DISCONTINUED | OUTPATIENT
Start: 2024-08-24 | End: 2024-08-26

## 2024-08-24 RX ORDER — CARBIDOPA AND LEVODOPA 25; 100 MG/1; MG/1
2 TABLET, EXTENDED RELEASE ORAL 2 TIMES DAILY
Status: DISCONTINUED | OUTPATIENT
Start: 2024-08-24 | End: 2024-08-30

## 2024-08-24 RX ORDER — FLUTICASONE PROPIONATE 50 MCG
1 SPRAY, SUSPENSION (ML) NASAL DAILY PRN
Status: DISCONTINUED | OUTPATIENT
Start: 2024-08-24 | End: 2024-09-19 | Stop reason: HOSPADM

## 2024-08-24 RX ORDER — KETOTIFEN FUMARATE 0.35 MG/ML
1 SOLUTION/ DROPS OPHTHALMIC DAILY PRN
Status: DISCONTINUED | OUTPATIENT
Start: 2024-08-24 | End: 2024-09-19 | Stop reason: HOSPADM

## 2024-08-24 RX ORDER — MINERAL OIL AND PETROLATUM 150; 830 MG/G; MG/G
OINTMENT OPHTHALMIC
Status: DISCONTINUED | OUTPATIENT
Start: 2024-08-24 | End: 2024-09-19 | Stop reason: HOSPADM

## 2024-08-24 RX ORDER — GUAIFENESIN 100 MG/5ML
200 SOLUTION ORAL 4 TIMES DAILY PRN
Status: DISCONTINUED | OUTPATIENT
Start: 2024-08-24 | End: 2024-08-26

## 2024-08-24 RX ORDER — LEVOTHYROXINE SODIUM 25 UG/1
25 TABLET ORAL EVERY MORNING
Status: DISCONTINUED | OUTPATIENT
Start: 2024-08-25 | End: 2024-09-17

## 2024-08-24 RX ORDER — ASCORBIC ACID 500 MG
1000 TABLET ORAL DAILY
Status: DISCONTINUED | OUTPATIENT
Start: 2024-08-25 | End: 2024-08-26

## 2024-08-24 RX ORDER — BACITRACIN ZINC, NEOMYCIN SULFATE, AND POLYMYXIN B SULFATE 400; 3.5; 5 [IU]/G; MG/G; [IU]/G
1 OINTMENT TOPICAL 2 TIMES DAILY PRN
Status: DISCONTINUED | OUTPATIENT
Start: 2024-08-24 | End: 2024-08-24

## 2024-08-24 RX ORDER — KETOROLAC TROMETHAMINE 30 MG/ML
15 INJECTION, SOLUTION INTRAMUSCULAR; INTRAVENOUS ONCE
Status: COMPLETED | OUTPATIENT
Start: 2024-08-24 | End: 2024-08-24

## 2024-08-24 RX ORDER — ARIPIPRAZOLE 2 MG/1
2 TABLET ORAL
Status: DISCONTINUED | OUTPATIENT
Start: 2024-08-24 | End: 2024-08-26

## 2024-08-24 RX ORDER — SODIUM CHLORIDE, SODIUM LACTATE, POTASSIUM CHLORIDE, CALCIUM CHLORIDE 600; 310; 30; 20 MG/100ML; MG/100ML; MG/100ML; MG/100ML
75 INJECTION, SOLUTION INTRAVENOUS CONTINUOUS
Status: DISCONTINUED | OUTPATIENT
Start: 2024-08-24 | End: 2024-08-27

## 2024-08-24 RX ORDER — AMMONIUM LACTATE 12 G/100G
CREAM TOPICAL 2 TIMES DAILY PRN
Status: DISCONTINUED | OUTPATIENT
Start: 2024-08-24 | End: 2024-09-19 | Stop reason: HOSPADM

## 2024-08-24 RX ORDER — METHENAMINE HIPPURATE 1000 MG/1
1 TABLET ORAL 2 TIMES DAILY
Status: DISCONTINUED | OUTPATIENT
Start: 2024-08-24 | End: 2024-09-19 | Stop reason: HOSPADM

## 2024-08-24 RX ORDER — INSULIN LISPRO 100 [IU]/ML
1-5 INJECTION, SOLUTION INTRAVENOUS; SUBCUTANEOUS
Status: DISCONTINUED | OUTPATIENT
Start: 2024-08-24 | End: 2024-08-27

## 2024-08-24 RX ORDER — MINERAL OIL AND PETROLATUM 150; 830 MG/G; MG/G
OINTMENT OPHTHALMIC
Status: DISCONTINUED | OUTPATIENT
Start: 2024-08-24 | End: 2024-08-24

## 2024-08-24 RX ORDER — VALACYCLOVIR HYDROCHLORIDE 500 MG/1
1000 TABLET, FILM COATED ORAL EVERY 12 HOURS SCHEDULED
Status: DISCONTINUED | OUTPATIENT
Start: 2024-08-24 | End: 2024-08-26

## 2024-08-24 RX ORDER — CALCIUM CARBONATE 500 MG/1
750 TABLET, CHEWABLE ORAL 2 TIMES DAILY
Status: DISCONTINUED | OUTPATIENT
Start: 2024-08-24 | End: 2024-09-19 | Stop reason: HOSPADM

## 2024-08-24 RX ORDER — ENOXAPARIN SODIUM 100 MG/ML
40 INJECTION SUBCUTANEOUS DAILY
Status: DISCONTINUED | OUTPATIENT
Start: 2024-08-25 | End: 2024-09-19 | Stop reason: HOSPADM

## 2024-08-24 RX ORDER — TRAZODONE HYDROCHLORIDE 100 MG/1
100 TABLET ORAL
Status: DISCONTINUED | OUTPATIENT
Start: 2024-08-24 | End: 2024-08-26

## 2024-08-24 RX ORDER — PRAVASTATIN SODIUM 40 MG
40 TABLET ORAL
Status: DISCONTINUED | OUTPATIENT
Start: 2024-08-24 | End: 2024-08-26

## 2024-08-24 RX ORDER — DOCOSANOL 100 MG/G
CREAM TOPICAL
Status: DISCONTINUED | OUTPATIENT
Start: 2024-08-24 | End: 2024-09-19 | Stop reason: HOSPADM

## 2024-08-24 RX ORDER — CITALOPRAM HYDROBROMIDE 20 MG/1
40 TABLET ORAL DAILY
Status: DISCONTINUED | OUTPATIENT
Start: 2024-08-25 | End: 2024-08-26

## 2024-08-24 RX ORDER — BENZOCAINE/MENTHOL 6 MG-10 MG
LOZENGE MUCOUS MEMBRANE 4 TIMES DAILY PRN
Status: DISCONTINUED | OUTPATIENT
Start: 2024-08-24 | End: 2024-09-19 | Stop reason: HOSPADM

## 2024-08-24 RX ORDER — OXYBUTYNIN CHLORIDE 5 MG/1
5 TABLET ORAL 3 TIMES DAILY
Status: DISCONTINUED | OUTPATIENT
Start: 2024-08-24 | End: 2024-08-26

## 2024-08-24 RX ORDER — CARBIDOPA AND LEVODOPA 25; 100 MG/1; MG/1
1 TABLET, EXTENDED RELEASE ORAL
Status: DISCONTINUED | OUTPATIENT
Start: 2024-08-24 | End: 2024-08-24

## 2024-08-24 RX ORDER — POLYETHYLENE GLYCOL 3350 17 G/17G
17 POWDER, FOR SOLUTION ORAL DAILY
Status: DISCONTINUED | OUTPATIENT
Start: 2024-08-25 | End: 2024-09-19 | Stop reason: HOSPADM

## 2024-08-24 RX ADMIN — IOHEXOL 90 ML: 350 INJECTION, SOLUTION INTRAVENOUS at 14:56

## 2024-08-24 RX ADMIN — METHENAMINE HIPPURATE 1 G: 1 TABLET ORAL at 23:46

## 2024-08-24 RX ADMIN — OXYBUTYNIN CHLORIDE 5 MG: 5 TABLET ORAL at 23:37

## 2024-08-24 RX ADMIN — CEFPODOXIME PROXETIL 200 MG: 200 TABLET, FILM COATED ORAL at 13:19

## 2024-08-24 RX ADMIN — KETOROLAC TROMETHAMINE 15 MG: 30 INJECTION, SOLUTION INTRAMUSCULAR; INTRAVENOUS at 13:04

## 2024-08-24 RX ADMIN — CEFTRIAXONE SODIUM 1000 MG: 10 INJECTION, POWDER, FOR SOLUTION INTRAVENOUS at 17:39

## 2024-08-24 RX ADMIN — PRAVASTATIN SODIUM 40 MG: 40 TABLET ORAL at 23:37

## 2024-08-24 RX ADMIN — VALACYCLOVIR 1000 MG: 500 TABLET, FILM COATED ORAL at 23:37

## 2024-08-24 RX ADMIN — DOCOSANOL: 100 CREAM TOPICAL at 23:40

## 2024-08-24 RX ADMIN — CALCIUM CARBONATE (ANTACID) CHEW TAB 500 MG 750 MG: 500 CHEW TAB at 23:36

## 2024-08-24 RX ADMIN — LORAZEPAM 0.5 MG: 0.5 TABLET ORAL at 23:37

## 2024-08-24 RX ADMIN — INSULIN GLARGINE 5 UNITS: 100 INJECTION, SOLUTION SUBCUTANEOUS at 23:38

## 2024-08-24 RX ADMIN — TRAZODONE HYDROCHLORIDE 100 MG: 50 TABLET ORAL at 23:37

## 2024-08-24 RX ADMIN — BACLOFEN 10 MG: 10 TABLET ORAL at 23:37

## 2024-08-24 RX ADMIN — SODIUM CHLORIDE, SODIUM LACTATE, POTASSIUM CHLORIDE, AND CALCIUM CHLORIDE 75 ML/HR: .6; .31; .03; .02 INJECTION, SOLUTION INTRAVENOUS at 23:40

## 2024-08-24 NOTE — ASSESSMENT & PLAN NOTE
History of mood disturbances, previously on neuroleptics  Tardive dyskinesia on exam    PTA Sinemet   Please evaluate the utility of this medication

## 2024-08-24 NOTE — ASSESSMENT & PLAN NOTE
Previous culture 8/20-grew E. coli, pansensitive  CT abdomen pelvis demonstrates wall thickening of urinary bladder  Concurrent aspiration pneumonia    Plan  Will continue ceftriaxone on 1 g every 24 hours

## 2024-08-24 NOTE — ED NOTES
Caregiver at bedside noted patient brief was soiled with urine. Brief changed and linens refreshed. Patient positioned upright. No further needs at this time.      Donna Corbin RN  08/24/24 7825

## 2024-08-24 NOTE — ASSESSMENT & PLAN NOTE
Lab Results   Component Value Date    HGBA1C 6.2 (H) 08/13/2024       Recent Labs     08/23/24  1003 08/23/24  1142 08/23/24  1218 08/23/24  1318   POCGLU 399* 296* 229* 182*       Blood Sugar Average: Last 72 hrs:    Plan  Currently at [facility] 5 unit nightly  Algorithm 1 sliding scale

## 2024-08-24 NOTE — ASSESSMENT & PLAN NOTE
CT neck no organic obstructions in the mediastinum  transfer and oropharyngeal dysphagia etiology    Plan  GEN surge consult for PEG tube  Speech eval  IV fluid running currently at 75 cc an hour

## 2024-08-24 NOTE — H&P
[facility]  H&P  Name: Terrie Alicea 64 y.o. female I MRN: [id number]  Unit/Bed#: S -01 I Date of Admission: 8/24/2024   Date of Service: 8/24/2024 I Hospital Day: 0      Assessment & Plan   UTI (urinary tract infection)  Assessment & Plan  Previous culture 8/20-grew E. coli, pansensitive  CT abdomen pelvis demonstrates wall thickening of urinary bladder  Concurrent aspiration pneumonia    Plan  Will continue ceftriaxone on 1 g every 24 hours        Dysphagia  Assessment & Plan  CT neck no organic obstructions in the mediastinum  transfer and oropharyngeal dysphagia etiology    Plan  GEN surge consult for PEG tube  Speech eval  IV fluid running currently at 75 cc an hour        DM (diabetes mellitus) ([facility])  Assessment & Plan  Lab Results   Component Value Date    HGBA1C 6.2 (H) 08/13/2024       Recent Labs     08/23/24  1003 08/23/24  1142 08/23/24  1218 08/23/24  1318   POCGLU 399* 296* 229* 182*       Blood Sugar Average: Last 72 hrs:    Plan  Currently at Lantus 5 unit nightly  Algorithm 1 sliding scale    Mood disturbances  Assessment & Plan  Continue PTA   Abilify 2 mg  Citalopram 40 mg  Trazodone 100 mg      Parkinson's disease  Assessment & Plan  History of mood disturbances, previously on neuroleptics  Tardive dyskinesia on exam    PTA Sinemet   Please evaluate the utility of this medication    Incontinence  Assessment & Plan  Continue PTA oxybutynin 5 mg    Cerebral palsy (HCC)  Assessment & Plan  Continue PTA baclofen 10 mg                     VTE Pharmacologic Prophylaxis: VTE Score: 7 High Risk (Score >/= 5) - Pharmacological DVT Prophylaxis Ordered: enoxaparin (Lovenox). Sequential Compression Devices Ordered.  Code Status: Level 1 - Full Code full code  Discussion with family:  With charge nurse from care facility.     Anticipated Length of Stay: Patient will be admitted on an inpatient basis with an anticipated length of stay of greater than 2 midnights  secondary to surgery.    Chief Complaint: Altered mental status and fever    History of Present Illness:  Terrie Alicea is a 64 y.o. female with a PMH of dystonic cerebral palsy with intellectual disability, functional quadriplegia, bipolar who presents with fever and altered mental status.    Onset of this morning, she was found to be hypoxic, and lethargic at her care facility, leading to her admission.  Per facility caregiver, they noted no abnormal body movements, and currently her functional status and mobility returned to she was previously, no noted asymmetry in strength or hypoactivity were noted in all extremities.    She was recently discharged on the August 23 after 4 days of hospitalization for severe sepsis secondary to aspiration pneumonia and UTI, and she was discharged on Vantin.  Her urine culture grew E. coli.    Upon arrival patient was hemodynamically stable, afebrile, without oxygen requirement.  Workup with CT chest abdomen pelvis showing cystitis, distention of colon, and possible superimposed aspiration.  She was given Rocephin 1 g.  White count was normal    Her past medical history significant for malnutrition and dysphagia.  Barium swallow study in November 2023 demonstrated problem with bolus control and transfer, swallow initiation, soft palate elevation, hypopharyngeal elevation and reinsurance closure.  She has been on puréed thick liquid.  She was scheduled to have PEG tube on August 13 by GI, however her gastric anatomy was elevated, and scheduled PEG tube placement with surgery on August 27.       Review of Systems:  Per HPI    Past Medical and Surgical History:   Past Medical History:   Diagnosis Date    Acute metabolic encephalopathy 12/11/2015    Adjustment disorder     Altered mental status 12/11/2015    Anemia     Bipolar 1 disorder (HCC)     Cerebral palsy (HCC)     Chronic hypernatremia 02/06/2016    Closed fracture of left hip (HCC) 01/19/2016    Closed left hip fracture  ([facility])     no surgery    Constipation     Dehydration 02/20/2016    Developmental delay     Diabetes mellitus ([facility])     Difficulty walking     Disease of thyroid gland     Diverticulosis     Dysphagia     Worsening    Fracture of multiple ribs of right side     Herpes zoster without complication 06/02/2021    Hip fracture ([facility]) 07/26/2015    left    Hyperlipidemia     Hypernatremia 12/28/2018    Hypotension     Impulse control disorder     Incontinence     Intellectual disability due to developmental disorder, unspecified     Microalbuminuria     Neuropathy in diabetes ([facility])     Osteopathia     Osteoporosis     Peripheral neuropathy     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[facility])     Thrombocytopenia ([facility]) 07/31/2015       Past Surgical History:   Procedure Laterality Date    ABDOMINAL SURGERY      COLECTOMY MIN      re-anastomosis 7/22/16    COLON SURGERY  1/31/16    COLONOSCOPY N/A 07/21/2016    Procedure: COLONOSCOPY;  Surgeon: Rosalino Jacome MD;  Location: [facility];  Service:     COLONOSCOPY N/A 01/31/2016    Procedure: COLONOSCOPY;  Surgeon: Rosalino Jacome MD;  [facility];  Service:     COLONOSCOPY N/A 07/25/2017    Procedure: COLONOSCOPY;  Surgeon: Rosalino Jacome MD;  Location: [facility];  Service: Colorectal    COLOSTOMY      EXPLORATORY LAPAROTOMY W/ BOWEL RESECTION N/A 01/31/2016    Procedure: exploratory laparotomy, left sigmoidectomy, coloproctostomy, take down splenic flexure, loop colostomy;  Surgeon: Rosalino Jacome MD;  [facility];  Service:     WI CLOSURE ENTEROSTOMY LG/SMALL INTESTINE N/A 07/22/2016    Procedure: SEGMENTAL COLECTOMY WITH COLOCOLOSTOMY;  Surgeon: Rosalino Jacome MD;  [facility];  Service: Colorectal    UPPER GASTROINTESTINAL ENDOSCOPY         Meds/Allergies:  Prior to Admission medications    Medication Sig Start Date End Date Taking? Authorizing Provider   ARIPiprazole (ABILIFY) 2 mg tablet 1 tablet by Per G Tube route daily at bedtime 6/8/22   Historical  Provider, MD   Ascorbic Acid (vitamin C) 1000 MG tablet Crush 1 tablet daily 7/19/24   LEATHA Wright   baclofen 10 mg tablet 1 tablet (10 mg total) by Per G Tube route 3 (three) times a day 8/18/24   Aden Mcgregor MD   benzonatate (TESSALON PERLES) 100 mg capsule TAKE 1 CAPSULE BY MOUTH EVERY 8 HOURS AS NEEDED FOR COUGH **DO NOT CRUSH** 3/13/23   Juan David Villar MD   calcium carbonate (OS-MORRIS) 600 MG tablet 1 tablet (600 mg total) by Per G Tube route 2 (two) times a day with meals 8/18/24   Aden Mcgregor MD   carbamide peroxide (DEBROX) 6.5 % otic solution 2 drops 2 drops in the affected ear prn x 5 days    Historical Provider, MD   carbidopa-levodopa (SINEMET CR)  mg TBCR per ER tablet TAKE 2 TABLETS BY MOUTH (50/200MG) TWICE A DAY (PARKINSONS DISEASE) 3/26/24   Juan David Villar MD   cefpodoxime (VANTIN) 200 mg tablet Take 1 tablet (200 mg total) by mouth 2 (two) times a day for 3 days 8/23/24 8/26/24  Aniket Whatley PA-C   cholecalciferol (VITAMIN D3) 1,000 units tablet 1 tablet (1,000 Units total) by Per G Tube route daily 8/18/24   Aden Mcgregor MD   citalopram (CeleXA) 40 mg tablet 1 tablet by Per G Tube route daily 9/28/22   Historical Provider, MD   denosumab (Prolia) 60 mg/mL Inject 1 mL (60 mg total) under the skin once for 1 dose To be administered on 7/2/24 5/30/24 8/20/24  Juan David Villar MD   docosanol (ABREVA) 10 % Apply topically 5 (five) times a day Apply and rub in 5x a day until healed as needed for lesion 10/2/18   Usha [place] MD   ferrous sulfate 324 (65 Fe) mg Take 1 tablet by mouth 2 (two) times a day    Historical Provider, MD   fexofenadine (ALLEGRA) 30 MG/5ML suspension 30 mL (180 mg total) by Per G Tube route daily as needed (seasonal allergies) 8/18/24   Aden Mcgregor MD   fluticasone (FLONASE) 50 mcg/act nasal spray 1 spray into each nostril daily as needed for rhinitis or allergies 4/7/20   Chely Ny MD   glimepiride (AMARYL) 1 mg tablet 1 tablet  (1 mg total) by Per G Tube route daily with breakfast 8/18/24   Aden Mcgregor MD   guaiFENesin (DIABETIC TUSSIN EX) 100 MG/5ML oral liquid 10 mL (200 mg total) by Per G Tube route 4 (four) times a day as needed for cough 8/18/24   Aden Mcgregor MD   hydrocortisone 1 % cream Apply topically to affected area twice daily as needed for rash 6/11/20   Chely Ny MD   levothyroxine 25 mcg tablet 1 tablet (25 mcg total) by Per G Tube route every morning TAKE 1 TABLET BY MOUTH EVERY MORNING (HYPOTHYROIDISM) 8/18/24   Aden Mcgregor MD   LORazepam (ATIVAN) 0.5 mg tablet Take 0.5 mg by mouth daily Every day at 8pm    Historical Provider, MD   metFORMIN (GLUCOPHAGE) 1000 MG tablet 1 tablet (1,000 mg total) by Per G Tube route 2 (two) times a day with meals 8/18/24   Aden Mcgregor MD   methenamine hippurate (HIPREX) 1 g tablet Crush 1 tablet two times a day with meals 7/19/24   LEATHA Wright   neomycin-bacitracin-polymyxin b (NEOSPORIN) ointment Apply 1 application topically 2 (two) times a day as needed Apply to affected area BID PRN    Historical Provider, MD   olopatadine HCl (PATADAY) 0.2 % opth drops Administer 1 drop to both eyes as needed Instill 1 drop into affected eyes daily PRN for eye redness    Historical Provider, MD   omeprazole (PriLOSEC) 2 mg/mL oral suspension 10 mL (20 mg total) by Per G Tube route 2 (two) times a day before meals 8/13/24 2/9/25  Corrina Leos DO   oxybutynin (DITROPAN) 5 mg tablet 1 tablet (5 mg total) by Per G Tube route 3 (three) times a day 8/18/24   Aden Mcgregor MD   polyethylene glycol (GLYCOLAX) 17 GM/SCOOP powder 17 g by Per G Tube route daily 8/18/24   Aden Mcgregor MD   Refresh Liquigel 1 % GEL 2 (two) times a day 1 drop Bid bilaterally 8/13/23   Historical Provider, MD   simvastatin (ZOCOR) 20 mg tablet Take 1 tablet (20 mg total) by mouth daily at bedtime 8/18/24   Aden Mcgregor MD   Starch,  Thickening, POWD Take by mouth daily Use on food and liquid as directed 11/17/20   Sofie Frank DO   traZODone (DESYREL) 100 mg tablet 1 tablet (100 mg total) by Per G Tube route daily at bedtime Take 1 tablet (100 mg total) by mouth daily at  8 pm for depression 8/18/24   Aden Mcgregor MD   valACYclovir (VALTREX) 1,000 mg tablet Take 1 tablet (1,000 mg total) by mouth every 12 (twelve) hours for 2 doses 8/23/24 8/24/24  Aniket Whatley PA-C   Vitamins A & D (VITAMIN A & D) ointment Apply topically as needed for skin irritation    Historical Provider, MD     I have reviewed home medications with patient personally.    Allergies:   Allergies   Allergen Reactions    Keflex [Cephalexin] GI Intolerance       Social History:  Marital Status: Single   Occupation: Retired  Patient Pre-hospital Living Situation: Skilled nursing facility  Patient Pre-hospital Level of Mobility: power wheelchair  Patient Pre-hospital Diet Restrictions: Thick liquid  Substance Use History:   Social History     Substance and Sexual Activity   Alcohol Use Not Currently     Social History     Tobacco Use   Smoking Status Never   Smokeless Tobacco Never     Social History     Substance and Sexual Activity   Drug Use No       Family History:  Family History   Problem Relation Age of Onset    Alcohol abuse Mother     Alcohol abuse Father     Diabetes Sister     No Known Problems Sister     No Known Problems Sister        Physical Exam:     Vitals:   Blood Pressure: 133/74 (08/24/24 1924)  Pulse: 75 (08/24/24 1924)  Temperature: 99.7 °F (37.6 °C) (08/24/24 1924)  Temp Source: Rectal (08/24/24 1343)  Respirations: 16 (08/24/24 1924)  Weight - Scale: 41.8 kg (92 lb 2.4 oz) (08/24/24 0903)  SpO2: (!) 89 % (08/24/24 1924)    Physical Exam  Constitutional:       Comments: Alert and awake   HENT:      Mouth/Throat:      Comments: No erythema, no exudate  Cardiovascular:      Comments: Normal rate regular rhythm  Pulmonary:      Comments:  Lungs are clear  Abdominal:      Palpations: Abdomen is rigid.   Skin:     Comments: Warm   Neurological:      Comments: Spasticity in all extremities   Psychiatric:      Comments: Developmental delay          Additional Data:     Lab Results:  Results from last 7 days   Lab Units 08/24/24  0951   WBC Thousand/uL 5.85   HEMOGLOBIN g/dL 10.0*   HEMATOCRIT % 30.5*   PLATELETS Thousands/uL 153   SEGS PCT % 67   LYMPHO PCT % 20   MONO PCT % 9   EOS PCT % 2     Results from last 7 days   Lab Units 08/24/24  0951   SODIUM mmol/L 140   POTASSIUM mmol/L 4.7   CHLORIDE mmol/L 102   CO2 mmol/L 34*   BUN mg/dL 16   CREATININE mg/dL 0.96   ANION GAP mmol/L 4   CALCIUM mg/dL 9.5   ALBUMIN g/dL 3.4*   TOTAL BILIRUBIN mg/dL 0.28   ALK PHOS U/L 88   ALT U/L 49   AST U/L 17   GLUCOSE RANDOM mg/dL 427*     Results from last 7 days   Lab Units 08/20/24  1349   INR  0.94     Results from last 7 days   Lab Units 08/23/24  1318 08/23/24  1218 08/23/24  1142 08/23/24  1003 08/21/24  0429   POC GLUCOSE mg/dl 182* 229* 296* 399* 99     Lab Results   Component Value Date    HGBA1C 6.2 (H) 08/13/2024    HGBA1C 6.3 (H) 05/10/2024    HGBA1C 6.6 (H) 12/20/2023     Results from last 7 days   Lab Units 08/23/24  0522 08/22/24  0808 08/21/24  0442 08/20/24  1727 08/20/24  1425 08/20/24  1239   LACTIC ACID mmol/L  --   --  1.1 2.3* 2.8* 3.9*   PROCALCITONIN ng/ml 3.98* 7.11* 13.29*  --   --  1.80*       Lines/Drains:  Invasive Devices       Peripheral Intravenous Line  Duration             Peripheral IV 08/24/24 Proximal;Right;Ventral (anterior) Forearm <1 day                        Imaging: Reviewed radiology reports from this admission including: chest xray, chest CT scan, and abdominal/pelvic CT  CT soft tissue neck with contrast   Final Result by Prudencio Rowan MD (08/24 1531)      No acute finding in the neck by CT. See additional findings above.            Workstation performed: [id number]         CT chest abdomen pelvis w contrast   Final  Result by Prudencio Rowan MD (08/24 1524)      Cystitis with wall thickening the urinary bladder and mucosal enhancement. No findings of upper urinary tract infection.      Distention of the colon probably representing chronic colonic pseudoobstruction or adynamic colonic ileus.      Decubitus pressure change in the subcutaneous tissues along the bilateral buttocks.      Increasing dependent density in the lungs probably representing increasing atelectasis and possible superimposed aspiration. Early aspiration pneumonia is not excluded.      Layering secretions in the trachea      Focus of hyperenhancement along the anterior lower uterine segment. Differential diagnosis would include a cervical mass or fibroid. Correlation with pelvic ultrasound or MRI may be considered.      The study was marked in EPIC for immediate notification.      Workstation performed: [id number]         XR chest 1 view portable   ED Interpretation by Jamin Castro DO (08/24 1002)   No infiltrate now apparent      Final Result by Ezra Kaur MD (08/24 1216)      Clear lungs with interval resolution of the right basilar airspace opacity.            Workstation performed: [id number]             EKG and Other Studies Reviewed on Admission:   EKG: No EKG obtained.    ** Please Note: This note has been constructed using a voice recognition system. **

## 2024-08-25 LAB
ABO GROUP BLD: NORMAL
ANION GAP SERPL CALCULATED.3IONS-SCNC: 7 MMOL/L (ref 4–13)
BACTERIA BLD CULT: NORMAL
BACTERIA BLD CULT: NORMAL
BLD GP AB SCN SERPL QL: NEGATIVE
BUN SERPL-MCNC: 16 MG/DL (ref 5–25)
CALCIUM SERPL-MCNC: 9.4 MG/DL (ref 8.4–10.2)
CHLORIDE SERPL-SCNC: 106 MMOL/L (ref 96–108)
CO2 SERPL-SCNC: 31 MMOL/L (ref 21–32)
CREAT SERPL-MCNC: 0.94 MG/DL (ref 0.6–1.3)
ERYTHROCYTE [DISTWIDTH] IN BLOOD BY AUTOMATED COUNT: 12.8 % (ref 11.6–15.1)
GFR SERPL CREATININE-BSD FRML MDRD: 65 ML/MIN/1.73SQ M
GLUCOSE SERPL-MCNC: 167 MG/DL (ref 65–140)
GLUCOSE SERPL-MCNC: 169 MG/DL (ref 65–140)
GLUCOSE SERPL-MCNC: 172 MG/DL (ref 65–140)
GLUCOSE SERPL-MCNC: 52 MG/DL (ref 65–140)
GLUCOSE SERPL-MCNC: 83 MG/DL (ref 65–140)
HCT VFR BLD AUTO: 29.4 % (ref 34.8–46.1)
HGB BLD-MCNC: 9.6 G/DL (ref 11.5–15.4)
MCH RBC QN AUTO: 32.3 PG (ref 26.8–34.3)
MCHC RBC AUTO-ENTMCNC: 32.7 G/DL (ref 31.4–37.4)
MCV RBC AUTO: 99 FL (ref 82–98)
PLATELET # BLD AUTO: 178 THOUSANDS/UL (ref 149–390)
PMV BLD AUTO: 10.9 FL (ref 8.9–12.7)
POTASSIUM SERPL-SCNC: 3.4 MMOL/L (ref 3.5–5.3)
PROCALCITONIN SERPL-MCNC: 1.44 NG/ML
RBC # BLD AUTO: 2.97 MILLION/UL (ref 3.81–5.12)
RH BLD: POSITIVE
SODIUM SERPL-SCNC: 144 MMOL/L (ref 135–147)
SPECIMEN EXPIRATION DATE: NORMAL
WBC # BLD AUTO: 5.62 THOUSAND/UL (ref 4.31–10.16)

## 2024-08-25 PROCEDURE — 85027 COMPLETE CBC AUTOMATED: CPT | Performed by: INTERNAL MEDICINE

## 2024-08-25 PROCEDURE — 84145 PROCALCITONIN (PCT): CPT | Performed by: INTERNAL MEDICINE

## 2024-08-25 PROCEDURE — 99285 EMERGENCY DEPT VISIT HI MDM: CPT | Performed by: EMERGENCY MEDICINE

## 2024-08-25 PROCEDURE — 86850 RBC ANTIBODY SCREEN: CPT

## 2024-08-25 PROCEDURE — 92610 EVALUATE SWALLOWING FUNCTION: CPT

## 2024-08-25 PROCEDURE — 82948 REAGENT STRIP/BLOOD GLUCOSE: CPT

## 2024-08-25 PROCEDURE — 86901 BLOOD TYPING SEROLOGIC RH(D): CPT

## 2024-08-25 PROCEDURE — 80048 BASIC METABOLIC PNL TOTAL CA: CPT | Performed by: INTERNAL MEDICINE

## 2024-08-25 PROCEDURE — 99232 SBSQ HOSP IP/OBS MODERATE 35: CPT | Performed by: INTERNAL MEDICINE

## 2024-08-25 PROCEDURE — 86900 BLOOD TYPING SEROLOGIC ABO: CPT

## 2024-08-25 PROCEDURE — 99222 1ST HOSP IP/OBS MODERATE 55: CPT | Performed by: SURGERY

## 2024-08-25 RX ORDER — POTASSIUM CHLORIDE 14.9 MG/ML
20 INJECTION INTRAVENOUS
Status: COMPLETED | OUTPATIENT
Start: 2024-08-25 | End: 2024-08-25

## 2024-08-25 RX ORDER — DEXTROSE MONOHYDRATE 25 G/50ML
25 INJECTION, SOLUTION INTRAVENOUS ONCE
Status: COMPLETED | OUTPATIENT
Start: 2024-08-25 | End: 2024-08-25

## 2024-08-25 RX ORDER — METRONIDAZOLE 500 MG/100ML
500 INJECTION, SOLUTION INTRAVENOUS
Status: ACTIVE | OUTPATIENT
Start: 2024-08-26 | End: 2024-08-27

## 2024-08-25 RX ORDER — CEFAZOLIN SODIUM 2 G/50ML
2000 SOLUTION INTRAVENOUS
Status: ACTIVE | OUTPATIENT
Start: 2024-08-26 | End: 2024-08-27

## 2024-08-25 RX ADMIN — DOCOSANOL: 100 CREAM TOPICAL at 22:31

## 2024-08-25 RX ADMIN — INSULIN LISPRO 1 UNITS: 100 INJECTION, SOLUTION INTRAVENOUS; SUBCUTANEOUS at 22:33

## 2024-08-25 RX ADMIN — DEXTROSE MONOHYDRATE 25 ML: 25 INJECTION, SOLUTION INTRAVENOUS at 07:21

## 2024-08-25 RX ADMIN — SODIUM CHLORIDE, SODIUM LACTATE, POTASSIUM CHLORIDE, AND CALCIUM CHLORIDE 75 ML/HR: .6; .31; .03; .02 INJECTION, SOLUTION INTRAVENOUS at 22:30

## 2024-08-25 RX ADMIN — INSULIN LISPRO 10 UNITS: 100 INJECTION, SOLUTION INTRAVENOUS; SUBCUTANEOUS at 00:31

## 2024-08-25 RX ADMIN — INSULIN LISPRO 2 UNITS: 100 INJECTION, SOLUTION INTRAVENOUS; SUBCUTANEOUS at 00:32

## 2024-08-25 RX ADMIN — DOCOSANOL: 100 CREAM TOPICAL at 10:28

## 2024-08-25 RX ADMIN — POTASSIUM CHLORIDE 20 MEQ: 14.9 INJECTION, SOLUTION INTRAVENOUS at 10:25

## 2024-08-25 RX ADMIN — DOCOSANOL: 100 CREAM TOPICAL at 17:36

## 2024-08-25 RX ADMIN — DOCOSANOL: 100 CREAM TOPICAL at 16:07

## 2024-08-25 RX ADMIN — ARIPIPRAZOLE 2 MG: 2 TABLET ORAL at 00:41

## 2024-08-25 RX ADMIN — ENOXAPARIN SODIUM 40 MG: 40 INJECTION SUBCUTANEOUS at 10:28

## 2024-08-25 RX ADMIN — CEFTRIAXONE SODIUM 1000 MG: 10 INJECTION, POWDER, FOR SOLUTION INTRAVENOUS at 17:36

## 2024-08-25 RX ADMIN — DOCOSANOL: 100 CREAM TOPICAL at 07:28

## 2024-08-25 RX ADMIN — POTASSIUM CHLORIDE 20 MEQ: 14.9 INJECTION, SOLUTION INTRAVENOUS at 06:44

## 2024-08-25 NOTE — UTILIZATION REVIEW
Initial Clinical Review    Admission: Date/Time/Statement:   Admission Orders (From admission, onward)       Ordered        08/24/24 1613  INPATIENT ADMISSION  Once                          Orders Placed This Encounter   Procedures    INPATIENT ADMISSION     Standing Status:   Standing     Number of Occurrences:   1     Order Specific Question:   Level of Care     Answer:   Med Surg [16]     Order Specific Question:   Estimated length of stay     Answer:   More than 2 Midnights     Order Specific Question:   Certification     Answer:   I certify that inpatient services are medically necessary for this patient for a duration of greater than two midnights. See H&P and MD Progress Notes for additional information about the patient's course of treatment.     ED Arrival Information       Expected   -    Arrival   8/24/2024 08:56    Acuity   Urgent              Means of arrival   Ambulance    Escorted by   [facility]    Service   Hospitalist    Admission type   Emergency              Arrival complaint   ems             Chief Complaint   Patient presents with    Medical Problem     [facility]. Was discharged from hospital yesterday for PNA and UTI. Non-compliance with medications, per staff. Concerned that temp was 101F this AM and SpO2 was 86% on room air. After having a bath, temp reduced and oxygen saturation stabilized. Patient has cognitive impairment at baseline, caregiver at bedside.       Initial Presentation: 64 y.o. female  to ED via EMS from [facility].    Admitted to inpatient with Dx: UTI/Dysphagia/DM/Mood disturbances/parkinson/Cerebral palsy.  Presented to ED with refusal to take medications on day of arrival, febrile to 102 and transient hypoxia.  Making gurgling sounds. Patient was just discharged from hospital yesterday after being treated for UTI/pneumonia. PMHx: mental retardation, functional quadriplegia, dysphagia and cerebral palsy. Previously GI tried to  place feeding tube but was unsuccessful due to stomach being higher up and close to rib cage.   On exam: appears ill.   Herpetic lesions scattered across right lateral aspect of mouth.   Abdominal tenderness.  Non verbal. Pt will attempt to ingest very little until crying and spitting remainder out.    Procalcitonin 1.66.  glucose 427.wbc 5.85.  H&H 10/30.5.  Imaging shows cystitis.  Distention of colon; Decubitus pressure change in the subcutaneous tissues along the bilateral buttocks; Increasing dependent density in the lungs probably representing increasing atelectasis and possible superimposed aspiration; Layering secretions in the trache.    ED treatment:  Given Toradol, started on antibiotics.    Plan includes  to continue antibiotics.  Consult surgery for feeding tube.  Consult speech.  Start SSI.  Continue IVF.     Anticipated Length of Stay/Certification Statement: Patient will be admitted on an inpatient basis with an anticipated length of stay of greater than 2 midnights secondary to surgery.     Date: 8/25/24    Day 2: alert. Mental status at baseline, is not able to communicate or move around.   Wbc 5.62.  H&H 9.6/29.4.  K 3.4.   glucose 172>169.  Early am glucose to 83.  Insulin adjusted, given D50.   Continue ceftriaxone and IVF.   Seen by speech - recommend NPO with tube feeds.      Per surgery 8/25/24:  admitted for aspiration pneumonia and general surgery was consulted to perform an open gastrostomy while she is admitted to the hospital. pmhx of cerebral palsy and Parkinson's with a history of failure to thrive.   PEG tube placement was attempted on 8/13 but given her high riding stomach a safe window could not be established. She was referred to general surgery as an outpatient for evaluation for open gastrostomy tube placement.  Plan is open gastrostomy tube placement on 8/26.  IVF.       ED Triage Vitals [08/24/24 0903]   Temperature Pulse Respirations Blood Pressure SpO2 Pain Score   98.2 °F  (36.8 °C) 70 20 112/65 93 % --     Weight (last 2 days)       Date/Time Weight    08/24/24 0903 41.8 (92.15)            Vital Signs (last 3 days)       Date/Time Temp Pulse Resp BP MAP (mmHg) SpO2 O2 Device Patient Position - Orthostatic VS    08/25/24 07:09:30 98.5 °F (36.9 °C) 71 15 110/58 75 93 % -- --    08/24/24 22:34:31 99.9 °F (37.7 °C) 74 16 122/76 91 93 % -- --    08/24/24 2000 -- -- -- -- -- -- None (Room air) --    08/24/24 19:24:25 99.7 °F (37.6 °C) 75 16 133/74 94 89 % -- --    08/24/24 1900 -- 79 -- 134/70 98 91 % -- --    08/24/24 1830 -- 82 -- 157/78 108 92 % -- --    08/24/24 1608 -- 67 -- 122/64 86 92 % None (Room air) Lying    08/24/24 1343 99 °F (37.2 °C) -- -- -- -- -- -- --    08/24/24 1308 -- 71 20 130/63 -- 94 % None (Room air) Lying    08/24/24 1100 -- 65 -- 124/68 90 95 % -- --    08/24/24 0903 98.2 °F (36.8 °C) 70 20 112/65 -- 93 % None (Room air) Lying            Pertinent Labs/Diagnostic Test Results:   Radiology:  CT soft tissue neck with contrast   Final Interpretation by Prudencio Rowan MD (08/24 1531)      No acute finding in the neck by CT. See additional findings above.            Workstation performed: [id number]         CT chest abdomen pelvis w contrast   Final Interpretation by Prudencio Rowan MD (08/24 1524)      Cystitis with wall thickening the urinary bladder and mucosal enhancement. No findings of upper urinary tract infection.      Distention of the colon probably representing chronic colonic pseudoobstruction or adynamic colonic ileus.      Decubitus pressure change in the subcutaneous tissues along the bilateral buttocks.      Increasing dependent density in the lungs probably representing increasing atelectasis and possible superimposed aspiration. Early aspiration pneumonia is not excluded.      Layering secretions in the trachea      Focus of hyperenhancement along the anterior lower uterine segment. Differential diagnosis would include a cervical mass or  fibroid. Correlation with pelvic ultrasound or MRI may be considered.      The study was marked in EPIC for immediate notification.      Workstation performed: [id number]         XR chest 1 view portable   ED Interpretation by Jamin Castro DO (08/24 1002)   No infiltrate now apparent      Final Interpretation by Ezra aKur MD (08/24 1216)      Clear lungs with interval resolution of the right basilar airspace opacity.            Workstation performed: [id number]           Results from last 7 days   Lab Units 08/20/24  1239   SARS-COV-2  Negative     Results from last 7 days   Lab Units 08/25/24  0344 08/24/24  0951 08/23/24  0522 08/22/24  0808 08/21/24  0442 08/20/24  1239   WBC Thousand/uL 5.62 5.85 4.03* 4.22* 6.21 4.31   HEMOGLOBIN g/dL 9.6* 10.0* 10.0* 10.1* 9.8* 11.9   HEMATOCRIT % 29.4* 30.5* 30.4* 30.5* 31.5* 36.3   PLATELETS Thousands/uL 178 153 156 119* 106* 106*   TOTAL NEUT ABS Thousands/µL  --  3.98  --   --  3.95 3.30     Results from last 7 days   Lab Units 08/25/24  0344 08/24/24  0951 08/23/24  0821 08/22/24  0808 08/21/24  0442   SODIUM mmol/L 144 140 140 141 141   POTASSIUM mmol/L 3.4* 4.7 4.2 4.0 4.0   CHLORIDE mmol/L 106 102 105 105 105   CO2 mmol/L 31 34* 26 27 31   ANION GAP mmol/L 7 4 9 9 5   BUN mg/dL 16 16 18 18 22   CREATININE mg/dL 0.94 0.96 0.93 0.82 0.89   EGFR ml/min/1.73sq m 65 63 66 76 69   CALCIUM mg/dL 9.4 9.5 9.6 9.1 9.6     Results from last 7 days   Lab Units 08/24/24  0951 08/21/24  0442 08/20/24  1727 08/20/24  1239 08/20/24  0759   AST U/L 17 45*  --  57* 61*   ALT U/L 49 53*  --  33 22   ALK PHOS U/L 88 64  --  85 86   TOTAL PROTEIN g/dL 6.4 6.3*  --  7.4 7.3   ALBUMIN g/dL 3.4* 3.4*  --  3.9 3.8   TOTAL BILIRUBIN mg/dL 0.28 0.19*  --  0.22 0.20   BILIRUBIN DIRECT mg/dL  --  0.00  --   --   --    AMMONIA umol/L  --   --  31  --   --      Results from last 7 days   Lab Units 08/25/24  1115 08/25/24  0748 08/25/24  0707 08/24/24  2107 08/23/24  1318 08/23/24  1218  08/23/24  1142 08/23/24  1003 08/21/24  0429   POC GLUCOSE mg/dl 169* 172* 52* 264* 182* 229* 296* 399* 99     Results from last 7 days   Lab Units 08/25/24  0344 08/24/24  0951 08/23/24  0821 08/22/24  0808 08/21/24  0442 08/20/24  1239 08/20/24  0759   GLUCOSE RANDOM mg/dL 83 427* 430* 214* 116 168* 265*     Results from last 7 days   Lab Units 08/20/24  1727   PH LISSA  7.405*   PCO2 LISSA mm Hg 48.4   PO2 LISSA mm Hg 49.8*   HCO3 LISSA mmol/L 29.6   BASE EXC LISSA mmol/L 4.2   O2 CONTENT LISSA ml/dL 12.5   O2 HGB, VENOUS % 83.3*     Results from last 7 days   Lab Units 08/20/24  1349 08/20/24  0759   PROTIME seconds 13.3 12.4   INR  0.94 0.90   PTT seconds 25  --      Results from last 7 days   Lab Units 08/25/24  0344 08/24/24  1740 08/23/24  0522 08/22/24  0808 08/21/24  0442   PROCALCITONIN ng/ml 1.44* 1.66* 3.98* 7.11* 13.29*     Results from last 7 days   Lab Units 08/21/24  0442 08/20/24  1727 08/20/24  1425 08/20/24  1239   LACTIC ACID mmol/L 1.1 2.3* 2.8* 3.9*     Results from last 7 days   Lab Units 08/20/24  1303   CLARITY UA  Extra Turbid   COLOR UA  Yellow   SPEC GRAV UA  1.016   PH UA  6.5   GLUCOSE UA mg/dl Negative   KETONES UA mg/dl 10 (1+)*   BLOOD UA  Small*   PROTEIN UA mg/dl 30 (1+)*   NITRITE UA  Negative   BILIRUBIN UA  Negative   UROBILINOGEN UA (BE) mg/dl <2.0   LEUKOCYTES UA  Large*   WBC UA /hpf Innumerable*   RBC UA /hpf 30-50*   BACTERIA UA /hpf Moderate*   EPITHELIAL CELLS WET PREP /hpf None Seen     Results from last 7 days   Lab Units 08/20/24  1239   INFLUENZA A PCR  Negative   INFLUENZA B PCR  Negative   RSV PCR  Negative     Results from last 7 days   Lab Units 08/20/24  1303 08/20/24  1239   BLOOD CULTURE   --  No Growth After 4 Days.  No Growth After 4 Days.   URINE CULTURE  >100,000 cfu/ml Escherichia coli*  --      ED Treatment-Medication Administration from 08/24/2024 0856 to 08/24/2024 1915         Date/Time Order Dose Route Action     08/24/2024 1319 cefpodoxime (VANTIN) tablet 200  mg 200 mg Oral Given     08/24/2024 1304 ketorolac (TORADOL) injection 15 mg 15 mg Intravenous Given     08/24/2024 1456 iohexol (OMNIPAQUE) 350 MG/ML injection (MULTI-DOSE) 90 mL 90 mL Intravenous Given     08/24/2024 3085 ceftriaxone (ROCEPHIN) 1 g/50 mL in dextrose IVPB 1,000 mg Intravenous New Bag            Past Medical History:   Diagnosis Date    Acute metabolic encephalopathy 12/11/2015    Adjustment disorder     Altered mental status 12/11/2015    Anemia     Bipolar 1 disorder ([facility])     Cerebral palsy ([facility])     Chronic hypernatremia 02/06/2016    Closed fracture of left hip ([facility]) 01/19/2016    Closed left hip fracture ([facility])     no surgery    Constipation     Dehydration 02/20/2016    Developmental delay     Diabetes mellitus ([facility])     Difficulty walking     Disease of thyroid gland     Diverticulosis     Dysphagia     Worsening    Fracture of multiple ribs of right side     Herpes zoster without complication 06/02/2021    Hip fracture ([facility]) 07/26/2015    left    Hyperlipidemia     Hypernatremia 12/28/2018    Hypotension     Impulse control disorder     Incontinence     Intellectual disability due to developmental disorder, unspecified     Microalbuminuria     Neuropathy in diabetes ([facility])     Osteopathia     Osteoporosis     Peripheral neuropathy     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[facility])     Thrombocytopenia ([facility]) 07/31/2015     Present on Admission:   UTI (urinary tract infection)   DM (diabetes mellitus) ([facility])   Dysphagia   Parkinson's disease   Incontinence   Cerebral palsy ([facility])      Admitting Diagnosis: Oropharyngeal dysphagia [R13.12]  Cystitis [N30.90]  Hyperglycemia [R73.9]  Abnormal CT of the abdomen [R93.5]  Decreased oral intake [R63.8]  Aspiration into respiratory tract, initial encounter [T17.528A]  Age/Sex: 64 y.o. female  Admission Orders:  Scheduled Medications:  ARIPiprazole, 2 mg, Oral, HS  vitamin C, 1,000 mg, Oral, Daily  baclofen, 10 mg, Oral, TID  calcium carbonate, 750 mg, Oral,  BID  carbidopa-levodopa, 2 tablet, Oral, BID  [START ON 8/26/2024] cefazolin, 2,000 mg, Intravenous, On Call To OR  cefTRIAXone, 1,000 mg, Intravenous, Q24H  cholecalciferol, 1,000 Units, Oral, Daily  citalopram, 40 mg, Oral, Daily  docosanol, , Topical, 5x Daily  enoxaparin, 40 mg, Subcutaneous, Daily  Ferrous Sulfate, 300 mg, Oral, BID AC  insulin lispro, 1-5 Units, Subcutaneous, 4 times day  insulin lispro, 1-5 Units, Subcutaneous, HS  levothyroxine, 25 mcg, Per G Tube, QAM  LORazepam, 0.5 mg, Oral, Daily  methenamine hippurate, 1 g, Oral, BID  [START ON 8/26/2024] metroNIDAZOLE, 500 mg, Intravenous, On Call To OR  omeprazole (PRILOSEC) suspension 2 mg/mL, 20 mg, Oral, Daily  oxybutynin, 5 mg, Oral, TID  polyethylene glycol, 17 g, Oral, Daily  pravastatin, 40 mg, Oral, Daily With Dinner  traZODone, 100 mg, Oral, HS  valACYclovir, 1,000 mg, Oral, Q12H ROSEMARY    dextrose 50 % IV solution 25 mL  Dose: 25 mL  Freq: Once Route: IV  Start: 08/25/24 0730 End: 08/25/24 0721     insulin glargine (LANTUS) subcutaneous injection 5 Units 0.05 mL  Dose: 5 Units  Freq: Daily at bedtime Route: SC  Start: 08/24/24 2200 End: 08/25/24 1040     insulin lispro (HumALOG/ADMELOG) 100 units/mL subcutaneous injection 10 Units  Dose: 10 Units  Freq: Once Route: SC  Indications of Use: HYPERGLYCEMIA  Start: 08/24/24 2000 End: 08/25/24 0031   potassium chloride 20 mEq IVPB (premix)  Dose: 20 mEq  Freq: Every 2 hours Route: IV  Last Dose: 20 mEq (08/25/24 1025)  Start: 08/25/24 0645 End: 08/25/24 1225    Continuous IV Infusions:  lactated ringers, 75 mL/hr, Intravenous, Continuous      PRN Meds: not used.   acetaminophen, 650 mg, Oral, Q4H PRN  ammonium lactate, , Topical, BID PRN  artificial tear, , Both Eyes, HS PRN  benzonatate, 100 mg, Oral, TID PRN  carbamide peroxide, 2 drop, Both Ears, BID PRN  fluticasone, 1 spray, Nasal, Daily PRN  guaiFENesin, 200 mg, Per G Tube, 4x Daily PRN  hydrocortisone, , Topical, 4x Daily PRN  Ketotifen  Fumarate, 1 drop, Both Eyes, Daily PRN  Loratadine, 10 mg, Oral, Daily PRN    NPO; sips with medications     IP CONSULT TO ACUTE CARE SURGERY  IP CONSULT TO NUTRITION SERVICES    Network Utilization Review Department  ATTENTION: Please call with any questions or concerns to [phone] and carefully listen to the prompts so that you are directed to the right person. All voicemails are confidential.   For Discharge needs, contact Care Management DC Support Team at [phone] opt. [phone]  Send all requests for admission clinical reviews, approved or denied determinations and any other requests to dedicated fax number below belonging to the campus where the patient is receiving treatment. List of dedicated fax numbers for the Facilities:  FACILITY NAME UR FAX NUMBER   ADMISSION DENIALS (Administrative/Medical Necessity) [phone]   DISCHARGE SUPPORT TEAM (NETWORK) [phone]   PARENT CHILD HEALTH (Maternity/NICU/Pediatrics) [phone]   [facility] [phone]   [facility] [phone]   [facility] [phone]   [facility] [phone]   [facility] [phone]   [facility] [phone]   [facility] [phone]   [facility] [phone]   [facility] [phone]   [facility] [phone]   [facility] [phone]   [facility] [phone]

## 2024-08-25 NOTE — ASSESSMENT & PLAN NOTE
History of mood disturbances, previously on neuroleptics  Tardive dyskinesia on exam    PTA Sinemet

## 2024-08-25 NOTE — PLAN OF CARE
NPO for meals  Non-oral meds if possible, otherwise crushed in puree as best able.  Aspiration & reflux precautions      Erma Klein MS, CCC-SLP  Speech Pathologist  08/25/24  10:25 AM

## 2024-08-25 NOTE — ED PROVIDER NOTES
"History  Chief Complaint   Patient presents with    Medical Problem     [facility]. Was discharged from hospital yesterday for PNA and UTI. Non-compliance with medications, per staff. Concerned that temp was 101F this AM and SpO2 was 86% on room air. After having a bath, temp reduced and oxygen saturation stabilized. Patient has cognitive impairment at baseline, caregiver at bedside.       Medical Problem  Associated symptoms: fever        64yoF, pmhx below, presenting with RN from group home with concern for refusing PO, continuing to be febrile 102, and transient hypoxia. Pt also making \"gulping\" sound which RN states appears to be maneuver to attempt to compensate for pain vs other as she PO's. Otherwise, pt fatigued but near baseline. HPI/ROS limited 2/2 pt baseline nonverbal.    Prior to Admission Medications   Prescriptions Last Dose Informant Patient Reported? Taking?   ARIPiprazole (ABILIFY) 2 mg tablet  Care Giver Yes No   Si tablet by Per G Tube route daily at bedtime   Ascorbic Acid (vitamin C) 1000 MG tablet   No No   Sig: Crush 1 tablet daily   LORazepam (ATIVAN) 0.5 mg tablet  Care Giver Yes No   Sig: Take 0.5 mg by mouth daily Every day at 8pm   Refresh Liquigel 1 % GEL  Care Giver Yes No   Si (two) times a day 1 drop Bid bilaterally   Starch, Thickening, POWD  Care Giver No No   Sig: Take by mouth daily Use on food and liquid as directed   Vitamins A & D (VITAMIN A & D) ointment  Care Giver Yes No   Sig: Apply topically as needed for skin irritation   baclofen 10 mg tablet   No No   Si tablet (10 mg total) by Per G Tube route 3 (three) times a day   benzonatate (TESSALON PERLES) 100 mg capsule  Care Giver No No   Sig: TAKE 1 CAPSULE BY MOUTH EVERY 8 HOURS AS NEEDED FOR COUGH **DO NOT CRUSH**   calcium carbonate (OS-MORRIS) 600 MG tablet   No No   Si tablet (600 mg total) by Per G Tube route 2 (two) times a day with meals   carbamide peroxide (DEBROX) 6.5 % " otic solution  Care Giver Yes No   Si drops 2 drops in the affected ear prn x 5 days   carbidopa-levodopa (SINEMET CR)  mg TBCR per ER tablet  Care Giver No No   Sig: TAKE 2 TABLETS BY MOUTH (50/200MG) TWICE A DAY (PARKINSONS DISEASE)   cefpodoxime (VANTIN) 200 mg tablet   No No   Sig: Take 1 tablet (200 mg total) by mouth 2 (two) times a day for 3 days   cholecalciferol (VITAMIN D3) 1,000 units tablet   No No   Si tablet (1,000 Units total) by Per G Tube route daily   citalopram (CeleXA) 40 mg tablet  Care Giver Yes No   Si tablet by Per G Tube route daily   denosumab (Prolia) 60 mg/mL  Care Giver No No   Sig: Inject 1 mL (60 mg total) under the skin once for 1 dose To be administered on 24   docosanol (ABREVA) 10 %  Care Giver No No   Sig: Apply topically 5 (five) times a day Apply and rub in 5x a day until healed as needed for lesion   ferrous sulfate 324 (65 Fe) mg  Care Giver Yes No   Sig: Take 1 tablet by mouth 2 (two) times a day   fexofenadine (ALLEGRA) 30 MG/5ML suspension   No No   Si mL (180 mg total) by Per G Tube route daily as needed (seasonal allergies)   fluticasone (FLONASE) 50 mcg/act nasal spray  Care Giver No No   Si spray into each nostril daily as needed for rhinitis or allergies   glimepiride (AMARYL) 1 mg tablet   No No   Si tablet (1 mg total) by Per G Tube route daily with breakfast   guaiFENesin (DIABETIC TUSSIN EX) 100 MG/5ML oral liquid   No No   Sig: 10 mL (200 mg total) by Per G Tube route 4 (four) times a day as needed for cough   hydrocortisone 1 % cream  Care Giver No No   Sig: Apply topically to affected area twice daily as needed for rash   levothyroxine 25 mcg tablet   No No   Si tablet (25 mcg total) by Per G Tube route every morning TAKE 1 TABLET BY MOUTH EVERY MORNING (HYPOTHYROIDISM)   metFORMIN (GLUCOPHAGE) 1000 MG tablet   No No   Si tablet (1,000 mg total) by Per G Tube route 2 (two) times a day with meals   methenamine hippurate  (HIPREX) 1 g tablet   No No   Sig: Crush 1 tablet two times a day with meals   neomycin-bacitracin-polymyxin b (NEOSPORIN) ointment  Care Giver Yes No   Sig: Apply 1 application topically 2 (two) times a day as needed Apply to affected area BID PRN   olopatadine HCl (PATADAY) 0.2 % opth drops  Care Giver Yes No   Sig: Administer 1 drop to both eyes as needed Instill 1 drop into affected eyes daily PRN for eye redness   omeprazole (PriLOSEC) 2 mg/mL oral suspension   No No   Sig: 10 mL (20 mg total) by Per G Tube route 2 (two) times a day before meals   oxybutynin (DITROPAN) 5 mg tablet   No No   Si tablet (5 mg total) by Per G Tube route 3 (three) times a day   polyethylene glycol (GLYCOLAX) 17 GM/SCOOP powder   No No   Si g by Per G Tube route daily   simvastatin (ZOCOR) 20 mg tablet   No No   Sig: Take 1 tablet (20 mg total) by mouth daily at bedtime   traZODone (DESYREL) 100 mg tablet   No No   Si tablet (100 mg total) by Per G Tube route daily at bedtime Take 1 tablet (100 mg total) by mouth daily at  8 pm for depression   valACYclovir (VALTREX) 1,000 mg tablet   No No   Sig: Take 1 tablet (1,000 mg total) by mouth every 12 (twelve) hours for 2 doses      Facility-Administered Medications: None       Past Medical History:   Diagnosis Date    Acute metabolic encephalopathy 2015    Adjustment disorder     Altered mental status 2015    Anemia     Bipolar 1 disorder (HCC)     Cerebral palsy (HCC)     Chronic hypernatremia 2016    Closed fracture of left hip (HCC) 2016    Closed left hip fracture (HCC)     no surgery    Constipation     Dehydration 2016    Developmental delay     Diabetes mellitus (HCC)     Difficulty walking     Disease of thyroid gland     Diverticulosis     Dysphagia     Worsening    Fracture of multiple ribs of right side     Herpes zoster without complication 2021    Hip fracture (HCC) 2015    left    Hyperlipidemia     Hypernatremia  12/28/2018    Hypotension     Impulse control disorder     Incontinence     Intellectual disability due to developmental disorder, unspecified     Microalbuminuria     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)     Osteopathia     Osteoporosis     Peripheral neuropathy     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HCC)     Thrombocytopenia (HCC) 07/31/2015       Past Surgical History:   Procedure Laterality Date    ABDOMINAL SURGERY      COLECTOMY MIN      re-anastomosis 7/22/16    COLON SURGERY  1/31/16    COLONOSCOPY N/A 07/21/2016    Procedure: COLONOSCOPY;  Surgeon: Rosalino Jacome MD;  Location: [facility];  Service:     COLONOSCOPY N/A 01/31/2016    Procedure: COLONOSCOPY;  Surgeon: Rosalino Jacome MD;  Location[facility];  Service:     COLONOSCOPY N/A 07/25/2017    Procedure: COLONOSCOPY;  Surgeon: Rosalino Jacome MD;  Location: [facility];  Service: Colorectal    COLOSTOMY      EXPLORATORY LAPAROTOMY W/ BOWEL RESECTION N/A 01/31/2016    Procedure: exploratory laparotomy, left sigmoidectomy, coloproctostomy, take down splenic flexure, loop colostomy;  Surgeon: Rosalino Jacome MD;  Location[facility];  Service:     LA CLOSURE ENTEROSTOMY LG/SMALL INTESTINE N/A 07/22/2016    Procedure: SEGMENTAL COLECTOMY WITH COLOCOLOSTOMY;  Surgeon: Rosalino Jacome MD;  Location: [facility];  Service: Colorectal    UPPER GASTROINTESTINAL ENDOSCOPY         Family History   Problem Relation Age of Onset    Alcohol abuse Mother     Alcohol abuse Father     Diabetes Sister     No Known Problems Sister     No Known Problems Sister      I have reviewed and agree with the history as documented.    E-Cigarette/Vaping    E-Cigarette Use Never User      E-Cigarette/Vaping Substances    Nicotine No     THC No     CBD No     Flavoring No     Other No     Unknown No      Social History     Tobacco Use    Smoking status: Never    Smokeless tobacco: Never   Vaping Use    Vaping status: Never Used   Substance Use Topics    Alcohol use: Not Currently    Drug  use: No       Review of Systems   Constitutional:  Positive for fever.       Physical Exam  Physical Exam  Vitals and nursing note reviewed.   Constitutional:       Appearance: She is well-developed. She is ill-appearing.   HENT:      Head: Normocephalic and atraumatic.      Comments: Herpetic lesions scattered across rt lateral aspect of mouth  Eyes:      Conjunctiva/sclera: Conjunctivae normal.   Cardiovascular:      Rate and Rhythm: Normal rate and regular rhythm.      Heart sounds: No murmur heard.  Pulmonary:      Effort: Pulmonary effort is normal. No respiratory distress.      Breath sounds: Normal breath sounds.   Abdominal:      Palpations: Abdomen is soft.      Tenderness: There is abdominal tenderness.   Musculoskeletal:         General: No swelling.      Cervical back: Neck supple.   Skin:     General: Skin is warm and dry.      Capillary Refill: Capillary refill takes less than 2 seconds.   Neurological:      Mental Status: She is alert. Mental status is at baseline.      Comments: Non-verbal         Vital Signs  ED Triage Vitals [08/24/24 0903]   Temperature Pulse Respirations Blood Pressure SpO2   98.2 °F (36.8 °C) 70 20 112/65 93 %      Temp Source Heart Rate Source Patient Position - Orthostatic VS BP Location FiO2 (%)   Axillary Monitor Lying Right arm --      Pain Score       --           Vitals:    08/24/24 1830 08/24/24 1900 08/24/24 1924 08/24/24 2234   BP: 157/78 134/70 133/74 122/76   Pulse: 82 79 75 74   Patient Position - Orthostatic VS:             Visual Acuity      ED Medications  Medications   ARIPiprazole (ABILIFY) tablet 2 mg (2 mg Oral Given 8/25/24 0041)   ascorbic acid (VITAMIN C) tablet 1,000 mg (has no administration in time range)   baclofen tablet 10 mg (10 mg Oral Given 8/24/24 2337)   benzonatate (TESSALON PERLES) capsule 100 mg (has no administration in time range)   calcium carbonate (TUMS) chewable tablet 750 mg (750 mg Oral Given 8/24/24 2336)   carbamide peroxide  (DEBROX) 6.5 % otic solution 2 drop (has no administration in time range)   Cholecalciferol (VITAMIN D3) tablet 1,000 Units (has no administration in time range)   citalopram (CeleXA) tablet 40 mg (has no administration in time range)   docosanol (ABREVA) 10 % cream ( Topical Given 8/24/24 2340)   Ferrous Sulfate oral syrup 300 mg (has no administration in time range)   fluticasone (FLONASE) 50 mcg/act nasal spray 1 spray (has no administration in time range)   Loratadine (CLARITIN) oral soln 10 mg (has no administration in time range)   guaiFENesin (ROBITUSSIN) oral liquid 200 mg (has no administration in time range)   hydrocortisone 1 % cream (has no administration in time range)   levothyroxine tablet 25 mcg (has no administration in time range)   LORazepam (ATIVAN) tablet 0.5 mg (0.5 mg Oral Given 8/24/24 2337)   methenamine hippurate (HIPREX) tablet 1 g (1 g Oral Given 8/24/24 2346)   Ketotifen Fumarate (ZADITOR) 0.035 % ophthalmic solution 1 drop (has no administration in time range)   oxybutynin (DITROPAN) tablet 5 mg (5 mg Oral Given 8/24/24 2337)   polyethylene glycol (MIRALAX) packet 17 g (has no administration in time range)   pravastatin (PRAVACHOL) tablet 40 mg (40 mg Oral Given 8/24/24 2337)   traZODone (DESYREL) tablet 100 mg (100 mg Oral Given 8/24/24 2337)   valACYclovir (VALTREX) tablet 1,000 mg (1,000 mg Oral Given 8/24/24 2337)   ammonium lactate (LAC-HYDRIN) 12 % cream (has no administration in time range)   lactated ringers infusion (75 mL/hr Intravenous New Bag 8/24/24 2340)   acetaminophen (TYLENOL) oral suspension 650 mg (has no administration in time range)   enoxaparin (LOVENOX) subcutaneous injection 40 mg (has no administration in time range)   insulin lispro (HumALOG/ADMELOG) 100 units/mL subcutaneous injection 1-5 Units ( Subcutaneous Canceled Entry 8/24/24 1845)   insulin lispro (HumALOG/ADMELOG) 100 units/mL subcutaneous injection 1-5 Units (2 Units Subcutaneous Given 8/25/24 0032)    insulin glargine (LANTUS) subcutaneous injection 5 Units 0.05 mL (5 Units Subcutaneous Given 8/24/24 2338)   ceftriaxone (ROCEPHIN) 1 g/50 mL in dextrose IVPB ( Intravenous Canceled Entry 8/25/24 0030)   omeprazole (PRILOSEC) suspension 2 mg/mL (has no administration in time range)   artificial tear ophthalmic ointment (has no administration in time range)   carbidopa-levodopa (SINEMET CR)  mg per ER tablet 2 tablet (0 tablets Oral Hold 8/25/24 0041)   cefpodoxime (VANTIN) tablet 200 mg (200 mg Oral Given 8/24/24 1319)   ketorolac (TORADOL) injection 15 mg (15 mg Intravenous Given 8/24/24 1304)   iohexol (OMNIPAQUE) 350 MG/ML injection (MULTI-DOSE) 90 mL (90 mL Intravenous Given 8/24/24 1456)   ceftriaxone (ROCEPHIN) 1 g/50 mL in dextrose IVPB (0 mg Intravenous Stopped 8/24/24 1858)   insulin lispro (HumALOG/ADMELOG) 100 units/mL subcutaneous injection 10 Units (10 Units Subcutaneous Given 8/25/24 0031)       Diagnostic Studies  Results Reviewed       Procedure Component Value Units Date/Time    Procalcitonin [[id number]]  (Abnormal) Collected: 08/24/24 1740    Lab Status: Final result Specimen: Blood from Arm, Right Updated: 08/24/24 1942     Procalcitonin 1.66 ng/ml     UA w Reflex to Microscopic w Reflex to Culture [[id number]]     Lab Status: No result Specimen: Urine     Platelet count [[id number]]     Lab Status: No result Specimen: Blood     Comprehensive metabolic panel [[id number]]  (Abnormal) Collected: 08/24/24 0951    Lab Status: Final result Specimen: Blood from Arm, Right Updated: 08/24/24 1135     Sodium 140 mmol/L      Potassium 4.7 mmol/L      Chloride 102 mmol/L      CO2 34 mmol/L      ANION GAP 4 mmol/L      BUN 16 mg/dL      Creatinine 0.96 mg/dL      Glucose 427 mg/dL      Calcium 9.5 mg/dL      Corrected Calcium 10.0 mg/dL      AST 17 U/L      ALT 49 U/L      Alkaline Phosphatase 88 U/L      Total Protein 6.4 g/dL      Albumin 3.4 g/dL      Total Bilirubin 0.28 mg/dL      eGFR 63  ml/min/1.73sq m     Narrative:      [employer] guidelines for Chronic Kidney Disease (CKD):     Stage 1 with normal or high GFR (GFR > 90 mL/min/1.73 square meters)    Stage 2 Mild CKD (GFR = 60-89 mL/min/1.73 square meters)    Stage 3A Moderate CKD (GFR = 45-59 mL/min/1.73 square meters)    Stage 3B Moderate CKD (GFR = 30-44 mL/min/1.73 square meters)    Stage 4 Severe CKD (GFR = 15-29 mL/min/1.73 square meters)    Stage 5 End Stage CKD (GFR <15 mL/min/1.73 square meters)  Note: GFR calculation is accurate only with a steady state creatinine    CBC and differential [[id number]]  (Abnormal) Collected: 08/24/24 0951    Lab Status: Final result Specimen: Blood from Arm, Right Updated: 08/24/24 0958     WBC 5.85 Thousand/uL      RBC 3.07 Million/uL      Hemoglobin 10.0 g/dL      Hematocrit 30.5 %      MCV 99 fL      MCH 32.6 pg      MCHC 32.8 g/dL      RDW 13.0 %      MPV 11.1 fL      Platelets 153 Thousands/uL      nRBC 0 /100 WBCs      Segmented % 67 %      Immature Grans % 1 %      Lymphocytes % 20 %      Monocytes % 9 %      Eosinophils Relative 2 %      Basophils Relative 1 %      Absolute Neutrophils 3.98 Thousands/µL      Absolute Immature Grans 0.03 Thousand/uL      Absolute Lymphocytes 1.15 Thousands/µL      Absolute Monocytes 0.52 Thousand/µL      Eosinophils Absolute 0.14 Thousand/µL      Basophils Absolute 0.03 Thousands/µL                    CT soft tissue neck with contrast   Final Result by Prudencio Rowan MD (08/24 1536)      No acute finding in the neck by CT. See additional findings above.            Workstation performed: [id number]         CT chest abdomen pelvis w contrast   Final Result by Prudencio Rowan MD (08/24 1520)      Cystitis with wall thickening the urinary bladder and mucosal enhancement. No findings of upper urinary tract infection.      Distention of the colon probably representing chronic colonic pseudoobstruction or adynamic colonic ileus.      Decubitus  "pressure change in the subcutaneous tissues along the bilateral buttocks.      Increasing dependent density in the lungs probably representing increasing atelectasis and possible superimposed aspiration. Early aspiration pneumonia is not excluded.      Layering secretions in the trachea      Focus of hyperenhancement along the anterior lower uterine segment. Differential diagnosis would include a cervical mass or fibroid. Correlation with pelvic ultrasound or MRI may be considered.      The study was marked in EPIC for immediate notification.      Workstation performed: [id number]         XR chest 1 view portable   ED Interpretation by Jamin Castro DO (08/24 1002)   No infiltrate now apparent      Final Result by Ezra Kaur MD (08/24 1216)      Clear lungs with interval resolution of the right basilar airspace opacity.            Workstation performed: [id number]                    Procedures  Procedures         ED Course  ED Course as of 08/25/24 0623   Sat Aug 24, 2024   1109 101 Fever this AM                                               Medical Decision Making  64yoF presenting to ER with febrile illness, uptrending WBC, and failure to PO. Pt will attempt to ingest very little until crying and spitting remainder out. She was provided toradol with thought of possible pharyngitis, but this changed nothing clinically. CMP with hyperglycemia as pt not tolerating oral DM meds. CT neck without abscess, obvious mechanical limtiation. Chest with aspiration which likely secondary to odd swallowing mechanics as of recent. Abdomen with considerable gas which is likely secondary to new behavior \"gulping\" rather than ileus. Pt to require IV abx, consultation with surgery as likely to require more prompt G tube placement, and further supportive care. .    Amount and/or Complexity of Data Reviewed  Independent Historian: caregiver  External Data Reviewed: notes.  Labs: ordered.  Radiology: ordered and independent " interpretation performed.    Risk  Prescription drug management.  Decision regarding hospitalization.                 Disposition  Final diagnoses:   Cystitis   Aspiration into respiratory tract, initial encounter   Abnormal CT of the abdomen - uterine abnormality   Decreased oral intake   Hyperglycemia     Time reflects when diagnosis was documented in both MDM as applicable and the Disposition within this note       Time User Action Codes Description Comment    2024  3:51 PM MatthewJamin ellis Add [N30.90] Cystitis     2024  3:52 PM MatthewJamin ellis Add [P24.81] Aspiration by  with respiratory symptoms     2024  3:52 PM Matthew, Jamin Remove [P24.81] Aspiration by  with respiratory symptoms     2024  3:52 PM Matthew, Jamin Add [T17.908A] Aspiration into respiratory tract, initial encounter     2024  3:52 PM Jamin Castro Add [R93.5] Abnormal CT of the abdomen     2024  3:52 PM Matthew, Jamin Modify [R93.5] Abnormal CT of the abdomen uterine abnormality    2024  3:53 PM Jamin Castro Add [R63.8] Decreased oral intake     2024  4:04 PM Jamin Castro Add [R73.9] Hyperglycemia     2024  6:34 PM Donal Elder Add [R13.12] Oropharyngeal dysphagia     2024  7:26 PM Donal Elder Modify [N30.90] Cystitis     2024  7:26 PM Donal Elder Add [E43] Severe protein-calorie malnutrition (HCC)           ED Disposition       ED Disposition   Admit    Condition   Stable    Date/Time   Sat Aug 24, 2024  3:51 PM    Comment   Case was discussed with LINDA and the patient's admission status was agreed to be Admission Status: inpatient status .               Follow-up Information    None         Current Discharge Medication List        CONTINUE these medications which have NOT CHANGED    Details   ARIPiprazole (ABILIFY) 2 mg tablet 1 tablet by Per G Tube route daily at bedtime      Ascorbic Acid (vitamin C) 1000 MG tablet Crush 1 tablet daily  Qty: 30 tablet, Refills: 3    Associated  Diagnoses: Recurrent UTI      baclofen 10 mg tablet 1 tablet (10 mg total) by Per G Tube route 3 (three) times a day  Qty: 84 tablet, Refills: 0    Associated Diagnoses: Spasticity as late effect of cerebrovascular accident (CVA)      benzonatate (TESSALON PERLES) 100 mg capsule TAKE 1 CAPSULE BY MOUTH EVERY 8 HOURS AS NEEDED FOR COUGH **DO NOT CRUSH**  Qty: 5 capsule, Refills: 11    Associated Diagnoses: Cough      calcium carbonate (OS-MORRIS) 600 MG tablet 1 tablet (600 mg total) by Per G Tube route 2 (two) times a day with meals  Qty: 60 tablet, Refills: 0    Associated Diagnoses: Osteoporosis, unspecified osteoporosis type, unspecified pathological fracture presence      carbamide peroxide (DEBROX) 6.5 % otic solution 2 drops 2 drops in the affected ear prn x 5 days      carbidopa-levodopa (SINEMET CR)  mg TBCR per ER tablet TAKE 2 TABLETS BY MOUTH (50/200MG) TWICE A DAY (PARKINSONS DISEASE)  Qty: 112 tablet, Refills: 10    Associated Diagnoses: Parkinsonism, unspecified Parkinsonism type      cefpodoxime (VANTIN) 200 mg tablet Take 1 tablet (200 mg total) by mouth 2 (two) times a day for 3 days  Qty: 6 tablet, Refills: 0    Associated Diagnoses: Sepsis (HCC); Urinary tract infection      cholecalciferol (VITAMIN D3) 1,000 units tablet 1 tablet (1,000 Units total) by Per G Tube route daily  Qty: 90 tablet, Refills: 0    Associated Diagnoses: Osteoporosis      citalopram (CeleXA) 40 mg tablet 1 tablet by Per G Tube route daily      denosumab (Prolia) 60 mg/mL Inject 1 mL (60 mg total) under the skin once for 1 dose To be administered on 7/2/24  Qty: 1 mL, Refills: 0    Associated Diagnoses: Osteoporosis, unspecified osteoporosis type, unspecified pathological fracture presence      docosanol (ABREVA) 10 % Apply topically 5 (five) times a day Apply and rub in 5x a day until healed as needed for lesion  Refills: 0    Associated Diagnoses: Herpes labialis      ferrous sulfate 324 (65 Fe) mg Take 1 tablet by  mouth 2 (two) times a day      fexofenadine (ALLEGRA) 30 MG/5ML suspension 30 mL (180 mg total) by Per G Tube route daily as needed (seasonal allergies)  Qty: 237 mL, Refills: 0    Associated Diagnoses: Seasonal allergies      fluticasone (FLONASE) 50 mcg/act nasal spray 1 spray into each nostril daily as needed for rhinitis or allergies  Qty: 1 Bottle, Refills: 1    Associated Diagnoses: Seasonal allergies      glimepiride (AMARYL) 1 mg tablet 1 tablet (1 mg total) by Per G Tube route daily with breakfast  Qty: 28 tablet, Refills: 0    Associated Diagnoses: Type 2 diabetes mellitus with other specified complication, without long-term current use of insulin ([facility])      guaiFENesin (DIABETIC TUSSIN EX) 100 MG/5ML oral liquid 10 mL (200 mg total) by Per G Tube route 4 (four) times a day as needed for cough  Qty: 118 mL, Refills: 0    Associated Diagnoses: Viral illness      hydrocortisone 1 % cream Apply topically to affected area twice daily as needed for rash  Qty: 30 g, Refills: 0    Associated Diagnoses: Skin irritation      levothyroxine 25 mcg tablet 1 tablet (25 mcg total) by Per G Tube route every morning TAKE 1 TABLET BY MOUTH EVERY MORNING (HYPOTHYROIDISM)  Qty: 28 tablet, Refills: 0    Associated Diagnoses: Hypothyroidism, unspecified type      LORazepam (ATIVAN) 0.5 mg tablet Take 0.5 mg by mouth daily Every day at 8pm      metFORMIN (GLUCOPHAGE) 1000 MG tablet 1 tablet (1,000 mg total) by Per G Tube route 2 (two) times a day with meals  Qty: 56 tablet, Refills: 0    Associated Diagnoses: Type 2 diabetes mellitus without complication, without long-term current use of insulin ([facility])      methenamine hippurate (HIPREX) 1 g tablet Crush 1 tablet two times a day with meals  Qty: 60 tablet, Refills: 3    Associated Diagnoses: Recurrent UTI      neomycin-bacitracin-polymyxin b (NEOSPORIN) ointment Apply 1 application topically 2 (two) times a day as needed Apply to affected area BID PRN      olopatadine HCl  (PATADAY) 0.2 % opth drops Administer 1 drop to both eyes as needed Instill 1 drop into affected eyes daily PRN for eye redness      omeprazole (PriLOSEC) 2 mg/mL oral suspension 10 mL (20 mg total) by Per G Tube route 2 (two) times a day before meals  Qty: 900 mL, Refills: 1    Associated Diagnoses: Gastroesophageal reflux disease, unspecified whether esophagitis present      oxybutynin (DITROPAN) 5 mg tablet 1 tablet (5 mg total) by Per G Tube route 3 (three) times a day  Qty: 84 tablet, Refills: 0    Associated Diagnoses: Urinary incontinence, unspecified type      polyethylene glycol (GLYCOLAX) 17 GM/SCOOP powder 17 g by Per G Tube route daily  Qty: 510 g, Refills: 0    Associated Diagnoses: Constipation, unspecified constipation type      Refresh Liquigel 1 % GEL 2 (two) times a day 1 drop Bid bilaterally      simvastatin (ZOCOR) 20 mg tablet Take 1 tablet (20 mg total) by mouth daily at bedtime  Qty: 28 tablet, Refills: 0    Associated Diagnoses: Hyperlipidemia, unspecified hyperlipidemia type      Starch, Thickening, POWD Take by mouth daily Use on food and liquid as directed  Qty: 1020 g, Refills: 3    Associated Diagnoses: Oropharyngeal dysphagia      traZODone (DESYREL) 100 mg tablet 1 tablet (100 mg total) by Per G Tube route daily at bedtime Take 1 tablet (100 mg total) by mouth daily at  8 pm for depression  Qty: 90 tablet, Refills: 0    Associated Diagnoses: Bipolar affective disorder, remission status unspecified ([facility])      valACYclovir (VALTREX) 1,000 mg tablet Take 1 tablet (1,000 mg total) by mouth every 12 (twelve) hours for 2 doses  Qty: 2 tablet, Refills: 0    Associated Diagnoses: Herpes labialis      Vitamins A & D (VITAMIN A & D) ointment Apply topically as needed for skin irritation             No discharge procedures on file.    PDMP Review         Value Time User    PDMP Reviewed  Yes 9/1/2020  3:35 PM Edward Pickering MD            ED Provider  Electronically Signed by             Jamin  DO Matthew  08/25/24 0623

## 2024-08-25 NOTE — PROGRESS NOTES
Progress Note  Terrie Alicea 64 y.o. female MRN: [id number]  Unit/Bed#: S -01 Encounter: [id number]    Assessment:  64F with cerebral palsy, Parkinson's, multiple abdominal surgeries, p/w failure to thrive, c/f dysphagia s/p 6/11 unsuccessful PEG due to high-riding stomach.    Plan:  - NPO, IVF  - open G today  - ctx/valcyclovir for recent UTI/pna, oral herpetic lesions  - DVT ppx    Subjective/Objective   NAOE. Nonverbal. No acute distress.    Physical Exam:  General: NAD. Herpetic lesions at R mouth.  CV: nl rate  Lungs: nl wob. No resp distress.  ABD: Soft, ND, NT  Extrem: No CCE    Patient Vitals for the past 24 hrs:   BP Temp Temp src Pulse Resp SpO2   08/26/24 0700 111/68 100 °F (37.8 °C) Axillary 85 18 92 %   08/26/24 0658 111/68 100 °F (37.8 °C) -- 84 18 93 %   08/25/24 2314 138/86 99.9 °F (37.7 °C) -- 95 16 95 %   08/25/24 1624 133/71 -- -- 74 16 91 %       I/O         08/23 0701  08/24 0700 08/24 0701  08/25 0700 08/25 0701  08/26 0700    P.O.  0     IV Piggyback  50     Total Intake(mL/kg)  50 (1.2)     Net  +50            Unmeasured Urine Occurrence  2 x             Recent Labs     08/24/24  0951 08/25/24  0344 08/26/24  0451   WBC 5.85 5.62 8.35   HGB 10.0* 9.6* 10.0*    178 239   SODIUM 140 144 143   K 4.7 3.4* 4.3    106 103   CO2 34* 31 29   BUN 16 16 12   CREATININE 0.96 0.94 0.86   GLUC 427* 83 179*   CALCIUM 9.5 9.4 9.5   AST 17  --   --    ALT 49  --   --    ALKPHOS 88  --   --    TBILI 0.28  --   --    EGFR 63 65 72     Lab Results   Component Value Date    BLOODCX No Growth After 5 Days. 08/20/2024    BLOODCX No Growth After 5 Days. 08/20/2024    URINECX >100,000 cfu/ml Escherichia coli (A) 08/20/2024    LEUKOCYTESUR Large (A) 08/20/2024    LEUKOCYTESUR Small (A) 12/11/2015

## 2024-08-25 NOTE — ASSESSMENT & PLAN NOTE
Previous culture 8/20-grew E. coli, pansensitive  CT abdomen pelvis demonstrates wall thickening of urinary bladder  Concurrent aspiration pneumonia    Plan  continue ceftriaxone on 1 g every 24 hours

## 2024-08-25 NOTE — DISCHARGE INSTR - DIET
NPO for meals  Non-oral meds if possible, otherwise crushed in puree as best able.  Aspiration & reflux precautions

## 2024-08-25 NOTE — PROGRESS NOTES
[facility]  Progress Note  Name: Terrie Alicea I  MRN: [id number]  Unit/Bed#: S -01 I Date of Admission: 8/24/2024   Date of Service: 8/25/2024 I Hospital Day: 1    Assessment & Plan   * UTI (urinary tract infection)  Assessment & Plan  Previous culture 8/20-grew E. coli, pansensitive  CT abdomen pelvis demonstrates wall thickening of urinary bladder  Concurrent aspiration pneumonia    Plan  continue ceftriaxone on 1 g every 24 hours        Mood disturbances  Assessment & Plan  Continue PTA   Abilify 2 mg  Citalopram 40 mg  Trazodone 100 mg      Parkinson's disease  Assessment & Plan  History of mood disturbances, previously on neuroleptics  Tardive dyskinesia on exam    PTA Sinemet       Dysphagia  Assessment & Plan  CT neck no organic obstructions in the mediastinum  transfer and oropharyngeal dysphagia etiology    Plan  GEN surge consult for PEG tube  Speech eval  IV fluid running currently at 75 cc an hour        Incontinence  Assessment & Plan  Continue PTA oxybutynin 5 mg    DM (diabetes mellitus) ([facility])  Assessment & Plan  Lab Results   Component Value Date    HGBA1C 6.2 (H) 08/13/2024       Recent Labs     08/24/24  2107 08/25/24  0707 08/25/24  0748 08/25/24  1115   POCGLU 264* 52* 172* 169*         Blood Sugar Average: Last 72 hrs:  (P) 164.25  Plan  Discontinue Lantus  Algorithm 1 sliding scale    Cerebral palsy (HCC)  Assessment & Plan  Continue PTA baclofen 10 mg               VTE Pharmacologic Prophylaxis: VTE Score: 7 High Risk (Score >/= 5) - Pharmacological DVT Prophylaxis Ordered: enoxaparin (Lovenox). Sequential Compression Devices Ordered.    Mobility:   -[facility] Achieved: 2: Bed activities/Dependent transfer  Patient is nonambulatory at baseline    Patient Centered Rounds: I performed bedside rounds with nursing staff today.   Discussions with Specialists or Other Care Team Provider: IM    Education and Discussions with Family / Patient: Updated contact  Pt completed drug screening. Please advise of refill.    person (caregiver) via phone.    Total Time Spent on Date of Encounter in care of patient: 30 mins. This time was spent on one or more of the following: performing physical exam; counseling and coordination of care; obtaining or reviewing history; documenting in the medical record; reviewing/ordering tests, medications or procedures; communicating with other healthcare professionals and discussing with patient's family/caregivers.    Current Length of Stay: 1 day(s)  Current Patient Status: Inpatient   Certification Statement: The patient will continue to require additional inpatient hospital stay due to pending PEG placement  Discharge Plan: Anticipate discharge in 48-72 hrs to prior assisted or independent living facility.    Code Status: Level 1 - Full Code    Subjective:   Patient is alert, patient is at her baseline.  Patient is not able to communicate or move around.    Objective:     Vitals:   Temp (24hrs), Av.3 °F (37.4 °C), Min:98.5 °F (36.9 °C), Max:99.9 °F (37.7 °C)    Temp:  [98.5 °F (36.9 °C)-99.9 °F (37.7 °C)] 98.5 °F (36.9 °C)  HR:  [67-82] 71  Resp:  [15-20] 15  BP: (110-157)/(58-78) 110/58  SpO2:  [89 %-94 %] 93 %  Body mass index is 19.26 kg/m².     Input and Output Summary (last 24 hours):     Intake/Output Summary (Last 24 hours) at 2024 1227  Last data filed at 2024 2235  Gross per 24 hour   Intake 50 ml   Output --   Net 50 ml       Physical Exam:   Physical Exam  HENT:      Head: Normocephalic.      Right Ear: External ear normal.      Left Ear: External ear normal.      Nose: Nose normal.      Mouth/Throat:      Pharynx: Oropharynx is clear.   Eyes:      Extraocular Movements: Extraocular movements intact.      Conjunctiva/sclera: Conjunctivae normal.   Cardiovascular:      Rate and Rhythm: Normal rate and regular rhythm.      Pulses: Normal pulses.      Heart sounds: Normal heart sounds.   Pulmonary:      Effort: Pulmonary effort is normal.      Breath sounds: Normal breath  sounds.   Abdominal:      General: Bowel sounds are normal.      Palpations: Abdomen is soft.   Musculoskeletal:      Cervical back: Normal range of motion.   Skin:     General: Skin is warm.      Capillary Refill: Capillary refill takes less than 2 seconds.   Neurological:      Mental Status: She is alert. Mental status is at baseline.      Physical Exam  HENT:      Head: Normocephalic.      Right Ear: External ear normal.      Left Ear: External ear normal.      Nose: Nose normal.      Mouth/Throat:      Pharynx: Oropharynx is clear.   Eyes:      Extraocular Movements: Extraocular movements intact.      Conjunctiva/sclera: Conjunctivae normal.   Cardiovascular:      Rate and Rhythm: Normal rate and regular rhythm.      Pulses: Normal pulses.      Heart sounds: Normal heart sounds.   Pulmonary:      Effort: Pulmonary effort is normal.      Breath sounds: Normal breath sounds.   Abdominal:      General: Bowel sounds are normal.      Palpations: Abdomen is soft.   Musculoskeletal:      Cervical back: Normal range of motion.   Skin:     General: Skin is warm.      Capillary Refill: Capillary refill takes less than 2 seconds.   Neurological:      Mental Status: She is alert. Mental status is at baseline.          Additional Data:     Labs:  Results from last 7 days   Lab Units 08/25/24  0344 08/24/24  0951   WBC Thousand/uL 5.62 5.85   HEMOGLOBIN g/dL 9.6* 10.0*   HEMATOCRIT % 29.4* 30.5*   PLATELETS Thousands/uL 178 153   SEGS PCT %  --  67   LYMPHO PCT %  --  20   MONO PCT %  --  9   EOS PCT %  --  2     Results from last 7 days   Lab Units 08/25/24  0344 08/24/24  0951   SODIUM mmol/L 144 140   POTASSIUM mmol/L 3.4* 4.7   CHLORIDE mmol/L 106 102   CO2 mmol/L 31 34*   BUN mg/dL 16 16   CREATININE mg/dL 0.94 0.96   ANION GAP mmol/L 7 4   CALCIUM mg/dL 9.4 9.5   ALBUMIN g/dL  --  3.4*   TOTAL BILIRUBIN mg/dL  --  0.28   ALK PHOS U/L  --  88   ALT U/L  --  49   AST U/L  --  17   GLUCOSE RANDOM mg/dL 83 427*     Results  from last 7 days   Lab Units 08/20/24  1349   INR  0.94     Results from last 7 days   Lab Units 08/25/24  1115 08/25/24  0748 08/25/24  0707 08/24/24  2107 08/23/24  1318 08/23/24  1218 08/23/24  1142 08/23/24  1003 08/21/24  0429   POC GLUCOSE mg/dl 169* 172* 52* 264* 182* 229* 296* 399* 99         Results from last 7 days   Lab Units 08/25/24  0344 08/24/24  1740 08/23/24  0522 08/22/24  0808 08/21/24  0442 08/20/24  1727 08/20/24  1425 08/20/24  1239   LACTIC ACID mmol/L  --   --   --   --  1.1 2.3* 2.8* 3.9*   PROCALCITONIN ng/ml 1.44* 1.66* 3.98* 7.11* 13.29*  --   --  1.80*       Lines/Drains:  Invasive Devices       Peripheral Intravenous Line  Duration             Peripheral IV 08/24/24 Proximal;Right;Ventral (anterior) Forearm 1 day                          Imaging: Reviewed radiology reports from this admission including: chest CT scan    Recent Cultures (last 7 days):   Results from last 7 days   Lab Units 08/20/24  1303 08/20/24  1239   BLOOD CULTURE   --  No Growth After 4 Days.  No Growth After 4 Days.   URINE CULTURE  >100,000 cfu/ml Escherichia coli*  --        Last 24 Hours Medication List:   Current Facility-Administered Medications   Medication Dose Route Frequency Provider Last Rate    acetaminophen  650 mg Oral Q4H PRN Donal Elder MD      ammonium lactate   Topical BID PRN Donal Elder MD      ARIPiprazole  2 mg Oral HS Donal Elder MD      artificial tear   Both Eyes HS PRN Daphnie Cosme MD      vitamin C  1,000 mg Oral Daily Doanl Elder MD      baclofen  10 mg Oral TID Donal Elder MD      benzonatate  100 mg Oral TID PRN Donal Elder MD      calcium carbonate  750 mg Oral BID Donal Elder MD      carbamide peroxide  2 drop Both Ears BID PRN Donal Elder MD      carbidopa-levodopa  2 tablet Oral BID Reg Canada MD      [START ON 8/26/2024] cefazolin  2,000 mg Intravenous On Call To OR Zack Sharma MD      cefTRIAXone  1,000 mg Intravenous Q24H Katie Montero  MD Zulema      cholecalciferol  1,000 Units Oral Daily Donal Elder MD      citalopram  40 mg Oral Daily Donal Elder MD      docosanol   Topical 5x Daily Donal Elder MD      enoxaparin  40 mg Subcutaneous Daily Donal Elder MD      Ferrous Sulfate  300 mg Oral BID AC Donal Elder MD      fluticasone  1 spray Nasal Daily PRN Donal Elder MD      guaiFENesin  200 mg Per G Tube 4x Daily PRN Donal Elder MD      hydrocortisone   Topical 4x Daily PRN Donal Elder MD      insulin lispro  1-5 Units Subcutaneous 4 times day Donal Elder MD      insulin lispro  1-5 Units Subcutaneous HS Donal Elder MD      Ketotifen Fumarate  1 drop Both Eyes Daily PRN Donal Elder MD      lactated ringers  75 mL/hr Intravenous Continuous Donal Elder MD 75 mL/hr (08/24/24 2340)    levothyroxine  25 mcg Per G Tube QAM Donal Elder MD      Loratadine  10 mg Oral Daily PRN Donal Elder MD      LORazepam  0.5 mg Oral Daily Donal Elder MD      methenamine hippurate  1 g Oral BID Donal Elder MD      [START ON 8/26/2024] metroNIDAZOLE  500 mg Intravenous On Call To OR Zack Sharma MD      omeprazole (PRILOSEC) suspension 2 mg/mL  20 mg Oral Daily Donal Elder MD      oxybutynin  5 mg Oral TID Donal Elder MD      polyethylene glycol  17 g Oral Daily Donal Elder MD      pravastatin  40 mg Oral Daily With Dinner Donal Elder MD      traZODone  100 mg Oral HS Donal Elder MD      valACYclovir  1,000 mg Oral Q12H [facility] Donal Elder MD          Today, Patient Was Seen By: Bo Canada MD    **Please Note: This note may have been constructed using a voice recognition system.**

## 2024-08-25 NOTE — ASSESSMENT & PLAN NOTE
Lab Results   Component Value Date    HGBA1C 6.2 (H) 08/13/2024       Recent Labs     08/24/24  2107 08/25/24  0707 08/25/24  0748 08/25/24  1115   POCGLU 264* 52* 172* 169*         Blood Sugar Average: Last 72 hrs:  (P) 164.25  Plan  Discontinue Lantus  Algorithm 1 sliding scale

## 2024-08-26 ENCOUNTER — TRANSITIONAL CARE MANAGEMENT (OUTPATIENT)
Dept: FAMILY MEDICINE CLINIC | Facility: CLINIC | Age: 64
End: 2024-08-26

## 2024-08-26 ENCOUNTER — TELEPHONE (OUTPATIENT)
Dept: FAMILY MEDICINE CLINIC | Facility: CLINIC | Age: 64
End: 2024-08-26

## 2024-08-26 ENCOUNTER — ANESTHESIA (INPATIENT)
Dept: PERIOP | Facility: HOSPITAL | Age: 64
End: 2024-08-26
Payer: MEDICARE

## 2024-08-26 ENCOUNTER — ANESTHESIA EVENT (INPATIENT)
Dept: PERIOP | Facility: HOSPITAL | Age: 64
End: 2024-08-26
Payer: MEDICARE

## 2024-08-26 LAB
ANION GAP SERPL CALCULATED.3IONS-SCNC: 11 MMOL/L (ref 4–13)
BUN SERPL-MCNC: 12 MG/DL (ref 5–25)
CALCIUM SERPL-MCNC: 9.5 MG/DL (ref 8.4–10.2)
CHLORIDE SERPL-SCNC: 103 MMOL/L (ref 96–108)
CO2 SERPL-SCNC: 29 MMOL/L (ref 21–32)
CREAT SERPL-MCNC: 0.86 MG/DL (ref 0.6–1.3)
ERYTHROCYTE [DISTWIDTH] IN BLOOD BY AUTOMATED COUNT: 12.9 % (ref 11.6–15.1)
GFR SERPL CREATININE-BSD FRML MDRD: 72 ML/MIN/1.73SQ M
GLUCOSE SERPL-MCNC: 165 MG/DL (ref 65–140)
GLUCOSE SERPL-MCNC: 179 MG/DL (ref 65–140)
GLUCOSE SERPL-MCNC: 233 MG/DL (ref 65–140)
GLUCOSE SERPL-MCNC: 284 MG/DL (ref 65–140)
HCT VFR BLD AUTO: 31.1 % (ref 34.8–46.1)
HGB BLD-MCNC: 10 G/DL (ref 11.5–15.4)
MCH RBC QN AUTO: 32.2 PG (ref 26.8–34.3)
MCHC RBC AUTO-ENTMCNC: 32.2 G/DL (ref 31.4–37.4)
MCV RBC AUTO: 100 FL (ref 82–98)
PLATELET # BLD AUTO: 239 THOUSANDS/UL (ref 149–390)
PMV BLD AUTO: 11.6 FL (ref 8.9–12.7)
POTASSIUM SERPL-SCNC: 4.3 MMOL/L (ref 3.5–5.3)
PROCALCITONIN SERPL-MCNC: 0.62 NG/ML
RBC # BLD AUTO: 3.11 MILLION/UL (ref 3.81–5.12)
SODIUM SERPL-SCNC: 143 MMOL/L (ref 135–147)
WBC # BLD AUTO: 8.35 THOUSAND/UL (ref 4.31–10.16)

## 2024-08-26 PROCEDURE — 99232 SBSQ HOSP IP/OBS MODERATE 35: CPT | Performed by: HOSPITALIST

## 2024-08-26 PROCEDURE — 3E0G76Z INTRODUCTION OF NUTRITIONAL SUBSTANCE INTO UPPER GI, VIA NATURAL OR ARTIFICIAL OPENING: ICD-10-PCS | Performed by: HOSPITALIST

## 2024-08-26 PROCEDURE — 80048 BASIC METABOLIC PNL TOTAL CA: CPT

## 2024-08-26 PROCEDURE — 43830 GSTRST OPEN WO CONSTJ TUBE: CPT | Performed by: SURGERY

## 2024-08-26 PROCEDURE — 85027 COMPLETE CBC AUTOMATED: CPT

## 2024-08-26 PROCEDURE — 0DH60UZ INSERTION OF FEEDING DEVICE INTO STOMACH, OPEN APPROACH: ICD-10-PCS | Performed by: HOSPITALIST

## 2024-08-26 PROCEDURE — 99232 SBSQ HOSP IP/OBS MODERATE 35: CPT | Performed by: SURGERY

## 2024-08-26 PROCEDURE — 82948 REAGENT STRIP/BLOOD GLUCOSE: CPT

## 2024-08-26 PROCEDURE — 84145 PROCALCITONIN (PCT): CPT | Performed by: INTERNAL MEDICINE

## 2024-08-26 DEVICE — 16FR BALLOON G-TUBE WITH ENFIT® PORT (BOXED 1)
Type: IMPLANTABLE DEVICE | Site: STOMACH | Status: FUNCTIONAL
Brand: AMT BALLOON G-TUBE

## 2024-08-26 RX ORDER — OXYBUTYNIN CHLORIDE 5 MG/1
5 TABLET ORAL 3 TIMES DAILY
Status: DISCONTINUED | OUTPATIENT
Start: 2024-08-27 | End: 2024-09-19 | Stop reason: HOSPADM

## 2024-08-26 RX ORDER — ROCURONIUM BROMIDE 10 MG/ML
INJECTION, SOLUTION INTRAVENOUS AS NEEDED
Status: DISCONTINUED | OUTPATIENT
Start: 2024-08-26 | End: 2024-08-26

## 2024-08-26 RX ORDER — METOCLOPRAMIDE HYDROCHLORIDE 5 MG/ML
5 INJECTION INTRAMUSCULAR; INTRAVENOUS ONCE AS NEEDED
Status: DISCONTINUED | OUTPATIENT
Start: 2024-08-26 | End: 2024-08-26 | Stop reason: HOSPADM

## 2024-08-26 RX ORDER — CEFAZOLIN SODIUM 1 G/3ML
INJECTION, POWDER, FOR SOLUTION INTRAMUSCULAR; INTRAVENOUS AS NEEDED
Status: DISCONTINUED | OUTPATIENT
Start: 2024-08-26 | End: 2024-08-26

## 2024-08-26 RX ORDER — ASCORBIC ACID 500 MG
1000 TABLET ORAL DAILY
Status: DISCONTINUED | OUTPATIENT
Start: 2024-08-27 | End: 2024-09-19 | Stop reason: HOSPADM

## 2024-08-26 RX ORDER — OXYCODONE HCL 5 MG/5 ML
5 SOLUTION, ORAL ORAL EVERY 4 HOURS PRN
Status: DISCONTINUED | OUTPATIENT
Start: 2024-08-26 | End: 2024-08-30

## 2024-08-26 RX ORDER — ESMOLOL HYDROCHLORIDE 10 MG/ML
INJECTION INTRAVENOUS AS NEEDED
Status: DISCONTINUED | OUTPATIENT
Start: 2024-08-26 | End: 2024-08-26

## 2024-08-26 RX ORDER — SODIUM CHLORIDE, SODIUM LACTATE, POTASSIUM CHLORIDE, CALCIUM CHLORIDE 600; 310; 30; 20 MG/100ML; MG/100ML; MG/100ML; MG/100ML
125 INJECTION, SOLUTION INTRAVENOUS CONTINUOUS
Status: DISCONTINUED | OUTPATIENT
Start: 2024-08-26 | End: 2024-08-27

## 2024-08-26 RX ORDER — HYDROMORPHONE HCL/PF 1 MG/ML
0.5 SYRINGE (ML) INJECTION
Status: DISCONTINUED | OUTPATIENT
Start: 2024-08-26 | End: 2024-08-26 | Stop reason: HOSPADM

## 2024-08-26 RX ORDER — CITALOPRAM HYDROBROMIDE 20 MG/1
40 TABLET ORAL DAILY
Status: DISCONTINUED | OUTPATIENT
Start: 2024-08-27 | End: 2024-09-19 | Stop reason: HOSPADM

## 2024-08-26 RX ORDER — BACLOFEN 10 MG/1
10 TABLET ORAL 3 TIMES DAILY
Status: DISCONTINUED | OUTPATIENT
Start: 2024-08-27 | End: 2024-08-26

## 2024-08-26 RX ORDER — PRAVASTATIN SODIUM 40 MG
40 TABLET ORAL
Status: DISCONTINUED | OUTPATIENT
Start: 2024-08-27 | End: 2024-09-19 | Stop reason: HOSPADM

## 2024-08-26 RX ORDER — TRAZODONE HYDROCHLORIDE 100 MG/1
100 TABLET ORAL
Status: DISCONTINUED | OUTPATIENT
Start: 2024-08-27 | End: 2024-08-30

## 2024-08-26 RX ORDER — DEXAMETHASONE SODIUM PHOSPHATE 10 MG/ML
INJECTION, SOLUTION INTRAMUSCULAR; INTRAVENOUS AS NEEDED
Status: DISCONTINUED | OUTPATIENT
Start: 2024-08-26 | End: 2024-08-26

## 2024-08-26 RX ORDER — LORAZEPAM 0.5 MG/1
0.5 TABLET ORAL DAILY
Status: DISCONTINUED | OUTPATIENT
Start: 2024-08-27 | End: 2024-08-30

## 2024-08-26 RX ORDER — CLINDAMYCIN PHOSPHATE 150 MG/ML
600 INJECTION, SOLUTION INTRAVENOUS ONCE
Status: DISCONTINUED | OUTPATIENT
Start: 2024-08-26 | End: 2024-08-26 | Stop reason: ALTCHOICE

## 2024-08-26 RX ORDER — OXYCODONE HCL 5 MG/5 ML
2.5 SOLUTION, ORAL ORAL EVERY 4 HOURS PRN
Status: DISCONTINUED | OUTPATIENT
Start: 2024-08-26 | End: 2024-08-30

## 2024-08-26 RX ORDER — MIDAZOLAM HYDROCHLORIDE 2 MG/2ML
INJECTION, SOLUTION INTRAMUSCULAR; INTRAVENOUS AS NEEDED
Status: DISCONTINUED | OUTPATIENT
Start: 2024-08-26 | End: 2024-08-26

## 2024-08-26 RX ORDER — LABETALOL HYDROCHLORIDE 5 MG/ML
10 INJECTION, SOLUTION INTRAVENOUS
Status: DISCONTINUED | OUTPATIENT
Start: 2024-08-26 | End: 2024-08-26 | Stop reason: HOSPADM

## 2024-08-26 RX ORDER — BUPIVACAINE HYDROCHLORIDE AND EPINEPHRINE 2.5; 5 MG/ML; UG/ML
INJECTION, SOLUTION INFILTRATION; PERINEURAL AS NEEDED
Status: DISCONTINUED | OUTPATIENT
Start: 2024-08-26 | End: 2024-08-26 | Stop reason: HOSPADM

## 2024-08-26 RX ORDER — ONDANSETRON 2 MG/ML
INJECTION INTRAMUSCULAR; INTRAVENOUS AS NEEDED
Status: DISCONTINUED | OUTPATIENT
Start: 2024-08-26 | End: 2024-08-26

## 2024-08-26 RX ORDER — FENTANYL CITRATE/PF 50 MCG/ML
25 SYRINGE (ML) INJECTION
Status: DISCONTINUED | OUTPATIENT
Start: 2024-08-26 | End: 2024-08-26 | Stop reason: HOSPADM

## 2024-08-26 RX ORDER — POTASSIUM CHLORIDE 1500 MG/1
40 TABLET, EXTENDED RELEASE ORAL ONCE
Status: DISCONTINUED | OUTPATIENT
Start: 2024-08-26 | End: 2024-08-26

## 2024-08-26 RX ORDER — BACLOFEN 10 MG/1
10 TABLET ORAL 3 TIMES DAILY
Status: DISCONTINUED | OUTPATIENT
Start: 2024-08-27 | End: 2024-09-19 | Stop reason: HOSPADM

## 2024-08-26 RX ORDER — VALACYCLOVIR HYDROCHLORIDE 500 MG/1
1000 TABLET, FILM COATED ORAL EVERY 12 HOURS SCHEDULED
Status: DISCONTINUED | OUTPATIENT
Start: 2024-08-27 | End: 2024-08-30

## 2024-08-26 RX ORDER — PROMETHAZINE HYDROCHLORIDE 25 MG/ML
12.5 INJECTION, SOLUTION INTRAMUSCULAR; INTRAVENOUS ONCE AS NEEDED
Status: DISCONTINUED | OUTPATIENT
Start: 2024-08-26 | End: 2024-08-26 | Stop reason: HOSPADM

## 2024-08-26 RX ORDER — GUAIFENESIN/DEXTROMETHORPHAN 100-10MG/5
10 SYRUP ORAL EVERY 4 HOURS PRN
Status: DISCONTINUED | OUTPATIENT
Start: 2024-08-26 | End: 2024-09-19 | Stop reason: HOSPADM

## 2024-08-26 RX ORDER — ONDANSETRON 2 MG/ML
4 INJECTION INTRAMUSCULAR; INTRAVENOUS ONCE AS NEEDED
Status: DISCONTINUED | OUTPATIENT
Start: 2024-08-26 | End: 2024-08-26 | Stop reason: HOSPADM

## 2024-08-26 RX ORDER — HYDROMORPHONE HCL IN WATER/PF 6 MG/30 ML
0.2 PATIENT CONTROLLED ANALGESIA SYRINGE INTRAVENOUS
Status: DISCONTINUED | OUTPATIENT
Start: 2024-08-26 | End: 2024-08-26 | Stop reason: HOSPADM

## 2024-08-26 RX ORDER — ALBUTEROL SULFATE 0.83 MG/ML
2.5 SOLUTION RESPIRATORY (INHALATION) ONCE AS NEEDED
Status: DISCONTINUED | OUTPATIENT
Start: 2024-08-26 | End: 2024-08-26 | Stop reason: HOSPADM

## 2024-08-26 RX ORDER — KETOROLAC TROMETHAMINE 30 MG/ML
INJECTION, SOLUTION INTRAMUSCULAR; INTRAVENOUS AS NEEDED
Status: DISCONTINUED | OUTPATIENT
Start: 2024-08-26 | End: 2024-08-26

## 2024-08-26 RX ORDER — PROPOFOL 10 MG/ML
INJECTION, EMULSION INTRAVENOUS AS NEEDED
Status: DISCONTINUED | OUTPATIENT
Start: 2024-08-26 | End: 2024-08-26

## 2024-08-26 RX ORDER — LIDOCAINE HYDROCHLORIDE 10 MG/ML
INJECTION, SOLUTION EPIDURAL; INFILTRATION; INTRACAUDAL; PERINEURAL AS NEEDED
Status: DISCONTINUED | OUTPATIENT
Start: 2024-08-26 | End: 2024-08-26

## 2024-08-26 RX ORDER — ARIPIPRAZOLE 2 MG/1
2 TABLET ORAL
Status: DISCONTINUED | OUTPATIENT
Start: 2024-08-27 | End: 2024-09-19 | Stop reason: HOSPADM

## 2024-08-26 RX ORDER — HYDRALAZINE HYDROCHLORIDE 20 MG/ML
5 INJECTION INTRAMUSCULAR; INTRAVENOUS
Status: DISCONTINUED | OUTPATIENT
Start: 2024-08-26 | End: 2024-08-26 | Stop reason: HOSPADM

## 2024-08-26 RX ORDER — MAGNESIUM HYDROXIDE 1200 MG/15ML
LIQUID ORAL AS NEEDED
Status: DISCONTINUED | OUTPATIENT
Start: 2024-08-26 | End: 2024-08-26 | Stop reason: HOSPADM

## 2024-08-26 RX ORDER — FENTANYL CITRATE 50 UG/ML
INJECTION, SOLUTION INTRAMUSCULAR; INTRAVENOUS AS NEEDED
Status: DISCONTINUED | OUTPATIENT
Start: 2024-08-26 | End: 2024-08-26

## 2024-08-26 RX ADMIN — INSULIN LISPRO 2 UNITS: 100 INJECTION, SOLUTION INTRAVENOUS; SUBCUTANEOUS at 19:02

## 2024-08-26 RX ADMIN — INSULIN LISPRO 2 UNITS: 100 INJECTION, SOLUTION INTRAVENOUS; SUBCUTANEOUS at 23:41

## 2024-08-26 RX ADMIN — DOCOSANOL 1 APPLICATION: 100 CREAM TOPICAL at 07:04

## 2024-08-26 RX ADMIN — ROCURONIUM BROMIDE 20 MG: 10 INJECTION INTRAVENOUS at 16:20

## 2024-08-26 RX ADMIN — PROPOFOL 30 MG: 10 INJECTION, EMULSION INTRAVENOUS at 16:20

## 2024-08-26 RX ADMIN — CARBIDOPA AND LEVODOPA 2 TABLET: 25; 100 TABLET, EXTENDED RELEASE ORAL at 18:59

## 2024-08-26 RX ADMIN — MIDAZOLAM 0.5 MG: 1 INJECTION INTRAMUSCULAR; INTRAVENOUS at 16:10

## 2024-08-26 RX ADMIN — ROCURONIUM BROMIDE 10 MG: 10 INJECTION INTRAVENOUS at 16:42

## 2024-08-26 RX ADMIN — DOCOSANOL: 100 CREAM TOPICAL at 19:01

## 2024-08-26 RX ADMIN — VALACYCLOVIR 1000 MG: 500 TABLET, FILM COATED ORAL at 22:16

## 2024-08-26 RX ADMIN — CEFAZOLIN 1000 MG: 1 INJECTION, POWDER, FOR SOLUTION INTRAMUSCULAR; INTRAVENOUS at 16:28

## 2024-08-26 RX ADMIN — ONDANSETRON 4 MG: 2 INJECTION INTRAMUSCULAR; INTRAVENOUS at 17:17

## 2024-08-26 RX ADMIN — ARIPIPRAZOLE 2 MG: 2 TABLET ORAL at 22:18

## 2024-08-26 RX ADMIN — BACLOFEN 10 MG: 10 TABLET ORAL at 23:54

## 2024-08-26 RX ADMIN — METHENAMINE HIPPURATE 1 G: 1 TABLET ORAL at 19:00

## 2024-08-26 RX ADMIN — KETOROLAC TROMETHAMINE 15 MG: 30 INJECTION, SOLUTION INTRAMUSCULAR; INTRAVENOUS at 17:17

## 2024-08-26 RX ADMIN — SODIUM CHLORIDE, SODIUM LACTATE, POTASSIUM CHLORIDE, AND CALCIUM CHLORIDE 125 ML/HR: .6; .31; .03; .02 INJECTION, SOLUTION INTRAVENOUS at 18:59

## 2024-08-26 RX ADMIN — LORAZEPAM 0.5 MG: 0.5 TABLET ORAL at 19:01

## 2024-08-26 RX ADMIN — FENTANYL CITRATE 25 MCG: 50 INJECTION INTRAMUSCULAR; INTRAVENOUS at 17:29

## 2024-08-26 RX ADMIN — ESMOLOL HYDROCHLORIDE 10 MG: 10 INJECTION, SOLUTION INTRAVENOUS at 17:06

## 2024-08-26 RX ADMIN — INSULIN LISPRO 2 UNITS: 100 INJECTION, SOLUTION INTRAVENOUS; SUBCUTANEOUS at 07:45

## 2024-08-26 RX ADMIN — LIDOCAINE HYDROCHLORIDE 50 MG: 10 INJECTION, SOLUTION EPIDURAL; INFILTRATION; INTRACAUDAL at 16:20

## 2024-08-26 RX ADMIN — OXYBUTYNIN CHLORIDE 5 MG: 5 TABLET ORAL at 22:16

## 2024-08-26 RX ADMIN — OXYCODONE HYDROCHLORIDE 5 MG: 5 SOLUTION ORAL at 23:54

## 2024-08-26 RX ADMIN — DOCOSANOL: 100 CREAM TOPICAL at 22:17

## 2024-08-26 RX ADMIN — FENTANYL CITRATE 25 MCG: 50 INJECTION INTRAMUSCULAR; INTRAVENOUS at 16:20

## 2024-08-26 RX ADMIN — DEXAMETHASONE SODIUM PHOSPHATE 5 MG: 10 INJECTION, SOLUTION INTRAMUSCULAR; INTRAVENOUS at 16:24

## 2024-08-26 RX ADMIN — SUGAMMADEX 200 MG: 100 INJECTION, SOLUTION INTRAVENOUS at 16:58

## 2024-08-26 RX ADMIN — TRAZODONE HYDROCHLORIDE 100 MG: 50 TABLET ORAL at 19:01

## 2024-08-26 NOTE — MALNUTRITION/BMI
This medical record reflects one or more clinical indicators suggestive of malnutrition and/or morbid obesity.    Malnutrition Findings:   Adult Malnutrition type: Acute illness (in the setting of chronic illness)  Adult Degree of Malnutrition: Other severe protein calorie malnutrition  Malnutrition Characteristics: Fat loss, Muscle loss, Inadequate energy, Weight loss                  360 Statement: Severe protein calorie malnutrition related to inadequate oral intake as evidenced by < 75% estimated nutritional needs X > 1 month, 23.9% weight decrease x 9 months. Currently treated with plan to initiate enteral nutrition support.    BMI Findings:           Body mass index is 19.26 kg/m².     See Nutrition note dated 8/26/2024 for additional details.  Completed nutrition assessment is viewable in the nutrition documentation.

## 2024-08-26 NOTE — TELEPHONE ENCOUNTER
Signature requested     Dr Fernando    [facility]   Wound Care    [address]     Fax [phone]

## 2024-08-26 NOTE — ANESTHESIA PREPROCEDURE EVALUATION
Procedure:  INSERTION GASTROSTOMY TUBE OPEN (Abdomen)    Relevant Problems   ANESTHESIA (within normal limits)      CARDIO   (+) Hyperlipidemia      ENDO   (+) Hypothyroidism      GI/HEPATIC   (+) Dysphagia   (+) Gastroesophageal reflux disease   (+) 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HCC)      HEMATOLOGY   (+) Anemia   (+) Iron deficiency anemia      MUSCULOSKELETAL   (+) Osteoarthritis of both hips      NEURO/PSYCH   (+) Anxiety   (+) Other chronic pain      Endocrine   (+) DM (diabetes mellitus) (HCC)      Behavioral Health   (+) Bipolar disorder (HCC)      Neurology/Sleep   (+) Cerebral palsy ([facility])   (+) Developmental delay   (+) Dystonic cerebral palsy ([facility])   (+) Parkinson's disease   (+) Peripheral neuropathy   (+) 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      Anesthesia Plan  ASA Score- 3     Anesthesia Type- general with ASA Monitors.         Additional Monitors:     Airway Plan: ETT.           Plan Factors-Exercise tolerance (METS): <4 METS.    Chart reviewed.   Existing labs reviewed. Patient summary reviewed.                  Induction- intravenous.    Postoperative Plan- Plan for postoperative opioid use. Planned trial extubation    Perioperative Resuscitation Plan - Level 1 - Full Code.       Informed Consent- Anesthetic plan and risks discussed with healthcare power of .  I personally reviewed this patient with the CRNA. Discussed and agreed on the Anesthesia Plan with the CRNA..

## 2024-08-26 NOTE — UTILIZATION REVIEW
Continued Stay Review    Date: 8/26/24                           Current Patient Class: Inpatient  Current Level of Care: MS     HPI:64 y.o. female  with hx s/p 6/11 unsuccessful PEG due to high-riding stomach initially admitted on 8/24/24  with UTI/Dysphagia. Cachectic with a BMI of 19.26. Failure to thrive.     Assessment/Plan: Date: 8/26   Day 3: Has surpassed a 2nd midnight with active treatments and services.     Pt is non verbal. Has herpetic lesions at R mouth . Pt receiving po Acyclovir. IV ceftriaxone for recent UTI/PNA. Pt NPO with IVF infusing . Pt for o OR today approx 1600 for open G tube placement .   Per nutrition,  Severe protein calorie malnutrition related to inadequate oral intake as evidenced by < 75% estimated nutritional needs X > 1 month, 23.9% weight decrease x 9 months. Once G tube inserted, recommend Jevity 1.5 formula, start at 20mL/hr, advance rate by 10mL/hr every 4 hours to goal rate of 70mL/hr x12 hrs. eeds to be administered via pump from 6pm-6am. Recommend  60mL water flush before and after TF cycle, and an additional 500mL water daily that can be divided amongst med passes. At goal, will provide 1260 kcals, 54g protein, 638mL water in TF formula     8/26/24 @ 1630   NSERTION GASTROSTOMY TUBE OPEN    General anesthesia  Operative Findings:  -Abel gastrostomy, 16F G tube  -G tube 3.5cm at skin, 4cm at bumper     Medicine- plan  48 hrs for tube to mature and gradually introduce tube feeds . SLP eval pending for dysphagia. Continue IVF .    Vital Signs (last 3 days)       Date/Time Temp Pulse Resp BP MAP (mmHg) SpO2 Calculated FIO2 (%) - Nasal Cannula Nasal Cannula O2 Flow Rate (L/min) O2 Device Patient Position - Orthostatic VS    08/26/24 0712 -- -- -- -- -- -- 28 2 L/min Nasal cannula --    08/26/24 0700 100 °F (37.8 °C) 85 18 111/68 82 92 % -- -- -- Lying    08/26/24 06:58:44 100 °F (37.8 °C) 84 18 111/68 82 93 % -- -- -- --    08/25/24 23:14:26 99.9 °F (37.7 °C) 95 16 138/86 103  95 % -- -- -- --    08/25/24 2056 -- -- -- -- -- -- 28 2 L/min Nasal cannula --    08/25/24 16:24:01 -- 74 16 133/71 92 91 % -- -- None (Room air) Lying    08/25/24 1359 -- -- -- -- -- -- -- -- None (Room air) --    08/25/24 07:09:30 98.5 °F (36.9 °C) 71 15 110/58 75 93 % -- -- -- --    08/24/24 22:34:31 99.9 °F (37.7 °C) 74 16 122/76 91 93 % -- -- -- --    08/24/24 2000 -- -- -- -- -- -- -- -- None (Room air) --    08/24/24 19:24:25 99.7 °F (37.6 °C) 75 16 133/74 94 89 % -- -- -- --    08/24/24 1900 -- 79 -- 134/70 98 91 % -- -- -- --    08/24/24 1830 -- 82 -- 157/78 108 92 % -- -- -- --    08/24/24 1608 -- 67 -- 122/64 86 92 % -- -- None (Room air) Lying    08/24/24 1343 99 °F (37.2 °C) -- -- -- -- -- -- -- -- --    08/24/24 1308 -- 71 20 130/63 -- 94 % -- -- None (Room air) Lying    08/24/24 1100 -- 65 -- 124/68 90 95 % -- -- -- --    08/24/24 0903 98.2 °F (36.8 °C) 70 20 112/65 -- 93 % -- -- None (Room air) Lying          Weight (last 2 days)       Date/Time Weight    08/24/24 0903 41.8 (92.15)              Pertinent Labs/Diagnostic Results:   Radiology:  CT soft tissue neck with contrast   Final Interpretation by Prudencio Rowan MD (08/24 1531)      No acute finding in the neck by CT. See additional findings above.            Workstation performed: [id number]         CT chest abdomen pelvis w contrast   Final Interpretation by Prudencio Rowan MD (08/24 1524)      Cystitis with wall thickening the urinary bladder and mucosal enhancement. No findings of upper urinary tract infection.      Distention of the colon probably representing chronic colonic pseudoobstruction or adynamic colonic ileus.      Decubitus pressure change in the subcutaneous tissues along the bilateral buttocks.      Increasing dependent density in the lungs probably representing increasing atelectasis and possible superimposed aspiration. Early aspiration pneumonia is not excluded.      Layering secretions in the trachea      Focus of  hyperenhancement along the anterior lower uterine segment. Differential diagnosis would include a cervical mass or fibroid. Correlation with pelvic ultrasound or MRI may be considered.      The study was marked in EPIC for immediate notification.      Workstation performed: [id number]         XR chest 1 view portable   ED Interpretation by Jamin Castro DO (08/24 1002)   No infiltrate now apparent      Final Interpretation by Ezra Kaur MD (08/24 1216)      Clear lungs with interval resolution of the right basilar airspace opacity.            Workstation performed: [id number]           Cardiology:  No orders to display     GI:  No orders to display       Results from last 7 days   Lab Units 08/20/24  1239   SARS-COV-2  Negative     Results from last 7 days   Lab Units 08/26/24  0451 08/25/24  0344 08/24/24  0951 08/23/24  0522 08/22/24  0808 08/21/24  0442 08/20/24  1239   WBC Thousand/uL 8.35 5.62 5.85 4.03* 4.22* 6.21 4.31   HEMOGLOBIN g/dL 10.0* 9.6* 10.0* 10.0* 10.1* 9.8* 11.9   HEMATOCRIT % 31.1* 29.4* 30.5* 30.4* 30.5* 31.5* 36.3   PLATELETS Thousands/uL 239 178 153 156 119* 106* 106*   TOTAL NEUT ABS Thousands/µL  --   --  3.98  --   --  3.95 3.30         Results from last 7 days   Lab Units 08/26/24  0451 08/25/24  0344 08/24/24  0951 08/23/24  0821 08/22/24  0808   SODIUM mmol/L 143 144 140 140 141   POTASSIUM mmol/L 4.3 3.4* 4.7 4.2 4.0   CHLORIDE mmol/L 103 106 102 105 105   CO2 mmol/L 29 31 34* 26 27   ANION GAP mmol/L 11 7 4 9 9   BUN mg/dL 12 16 16 18 18   CREATININE mg/dL 0.86 0.94 0.96 0.93 0.82   EGFR ml/min/1.73sq m 72 65 63 66 76   CALCIUM mg/dL 9.5 9.4 9.5 9.6 9.1     Results from last 7 days   Lab Units 08/24/24  0951 08/21/24  0442 08/20/24  1727 08/20/24  1239 08/20/24  0759   AST U/L 17 45*  --  57* 61*   ALT U/L 49 53*  --  33 22   ALK PHOS U/L 88 64  --  85 86   TOTAL PROTEIN g/dL 6.4 6.3*  --  7.4 7.3   ALBUMIN g/dL 3.4* 3.4*  --  3.9 3.8   TOTAL BILIRUBIN mg/dL 0.28 0.19*  --  0.22 0.20  "  BILIRUBIN DIRECT mg/dL  --  0.00  --   --   --    AMMONIA umol/L  --   --  31  --   --      Results from last 7 days   Lab Units 08/26/24  0657 08/25/24  2053 08/25/24  1115 08/25/24  0748 08/25/24  0707 08/24/24  2107 08/23/24  1318 08/23/24  1218 08/23/24  1142 08/23/24  1003 08/21/24  0429   POC GLUCOSE mg/dl 233* 167* 169* 172* 52* 264* 182* 229* 296* 399* 99     Results from last 7 days   Lab Units 08/26/24  0451 08/25/24  0344 08/24/24  0951 08/23/24  0821 08/22/24  0808 08/21/24  0442 08/20/24  1239 08/20/24  0759   GLUCOSE RANDOM mg/dL 179* 83 427* 430* 214* 116 168* 265*             No results found for: \"BETA-HYDROXYBUTYRATE\"       Results from last 7 days   Lab Units 08/20/24  1727   PH LISSA  7.405*   PCO2 LISSA mm Hg 48.4   PO2 LISSA mm Hg 49.8*   HCO3 LISSA mmol/L 29.6   BASE EXC LISSA mmol/L 4.2   O2 CONTENT LISSA ml/dL 12.5   O2 HGB, VENOUS % 83.3*                     Results from last 7 days   Lab Units 08/20/24  1349 08/20/24  0759   PROTIME seconds 13.3 12.4   INR  0.94 0.90   PTT seconds 25  --          Results from last 7 days   Lab Units 08/25/24  0344 08/24/24  1740 08/23/24  0522 08/22/24  0808 08/21/24  0442   PROCALCITONIN ng/ml 1.44* 1.66* 3.98* 7.11* 13.29*     Results from last 7 days   Lab Units 08/21/24  0442 08/20/24  1727 08/20/24  1425 08/20/24  1239   LACTIC ACID mmol/L 1.1 2.3* 2.8* 3.9*                                                 Results from last 7 days   Lab Units 08/20/24  1303   CLARITY UA  Extra Turbid   COLOR UA  Yellow   SPEC GRAV UA  1.016   PH UA  6.5   GLUCOSE UA mg/dl Negative   KETONES UA mg/dl 10 (1+)*   BLOOD UA  Small*   PROTEIN UA mg/dl 30 (1+)*   NITRITE UA  Negative   BILIRUBIN UA  Negative   UROBILINOGEN UA (BE) mg/dl <2.0   LEUKOCYTES UA  Large*   WBC UA /hpf Innumerable*   RBC UA /hpf 30-50*   BACTERIA UA /hpf Moderate*   EPITHELIAL CELLS WET PREP /hpf None Seen     Results from last 7 days   Lab Units 08/20/24  1239   INFLUENZA A PCR  Negative   INFLUENZA B PCR  " Negative   RSV PCR  Negative                             Results from last 7 days   Lab Units 08/20/24  1303 08/20/24  1239   BLOOD CULTURE   --  No Growth After 5 Days.  No Growth After 5 Days.   URINE CULTURE  >100,000 cfu/ml Escherichia coli*  --                    Medications:   Scheduled Medications:  ARIPiprazole, 2 mg, Oral, HS  vitamin C, 1,000 mg, Oral, Daily  baclofen, 10 mg, Oral, TID  calcium carbonate, 750 mg, Oral, BID  carbidopa-levodopa, 2 tablet, Oral, BID  cefazolin, 2,000 mg, Intravenous, On Call To OR  cefTRIAXone, 1,000 mg, Intravenous, Q24H  cholecalciferol, 1,000 Units, Oral, Daily  citalopram, 40 mg, Oral, Daily  docosanol, , Topical, 5x Daily  enoxaparin, 40 mg, Subcutaneous, Daily  Ferrous Sulfate, 300 mg, Oral, BID AC  insulin lispro, 1-5 Units, Subcutaneous, 4 times day  insulin lispro, 1-5 Units, Subcutaneous, HS  levothyroxine, 25 mcg, Per G Tube, QAM  LORazepam, 0.5 mg, Oral, Daily  methenamine hippurate, 1 g, Oral, BID  metroNIDAZOLE, 500 mg, Intravenous, On Call To OR  omeprazole (PRILOSEC) suspension 2 mg/mL, 20 mg, Oral, Daily  oxybutynin, 5 mg, Oral, TID  polyethylene glycol, 17 g, Oral, Daily  pravastatin, 40 mg, Oral, Daily With Dinner  traZODone, 100 mg, Oral, HS  valACYclovir, 1,000 mg, Oral, Q12H ROSEMARY      Continuous IV Infusions:  lactated ringers, 75 mL/hr, Intravenous, Continuous      PRN Meds:  acetaminophen, 650 mg, Oral, Q4H PRN  ammonium lactate, , Topical, BID PRN  artificial tear, , Both Eyes, HS PRN  benzonatate, 100 mg, Oral, TID PRN  carbamide peroxide, 2 drop, Both Ears, BID PRN  fluticasone, 1 spray, Nasal, Daily PRN  guaiFENesin, 200 mg, Per G Tube, 4x Daily PRN  hydrocortisone, , Topical, 4x Daily PRN  Ketotifen Fumarate, 1 drop, Both Eyes, Daily PRN  Loratadine, 10 mg, Oral, Daily PRN        Discharge Plan: [facility] [[email or web address]    Network Utilization Review Department  ATTENTION: Please call with any questions or concerns to [phone] and carefully listen to the prompts so  that you are directed to the right person. All voicemails are confidential.   For Discharge needs, contact Care Management DC Support Team at [phone] opt. 2  Send all requests for admission clinical reviews, approved or denied determinations and any other requests to dedicated fax number below belonging to the campus where the patient is receiving treatment. List of dedicated fax numbers for the Facilities:  FACILITY NAME UR FAX NUMBER   ADMISSION DENIALS (Administrative/Medical Necessity) [phone]   DISCHARGE SUPPORT TEAM (NETWORK) [phone]   PARENT CHILD HEALTH (Maternity/NICU/Pediatrics) [phone]   [facility] [phone]   [facility] [phone]   [facility] [phone]   [facility] [phone]   [facility] [phone]   [facility] [phone]   [facility] [phone]   [facility] [phone]   [facility] [phone]   [facility] [phone]   [facility] [phone]   [facility] [phone]

## 2024-08-26 NOTE — ANESTHESIA POSTPROCEDURE EVALUATION
Post-Op Assessment Note    CV Status:  Stable  Pain Score: 0    Pain management: adequate       Mental Status:  Alert and awake   Hydration Status:  Euvolemic   PONV Controlled:  Controlled   Airway Patency:  Patent     Post Op Vitals Reviewed: Yes    No anethesia notable event occurred.    Staff: Anesthesiologist, CRNA               BP   178/89   Temp   98   Pulse  100   Resp   16   SpO2   95     
Normal vision: sees adequately in most situations; can see medication labels, newsprint

## 2024-08-26 NOTE — ASSESSMENT & PLAN NOTE
History of mood disturbances, previously on neuroleptics  Tardive dyskinesia on exam    Pt previously on Sinemet  ER 2 tablets BID, which cannot be crushed to deliver via g-tube. Per pt's caregiver, pt had been receiving Sinemet ER crushed so it is unclear how much active medication pt was truly receiving d/t damage of formulation. Consider dosage adjustment when converting to Sinemet  IR.

## 2024-08-26 NOTE — ASSESSMENT & PLAN NOTE
Previous culture 8/20-grew E. coli, pansensitive  CT abdomen pelvis demonstrates wall thickening of urinary bladder  Concurrent aspiration pneumonia    Plan:  Continue IV Rocephin 1g/50mL every 24 hours

## 2024-08-26 NOTE — UTILIZATION REVIEW
NOTIFICATION OF ADMISSION DISCHARGE   This is a Notification of Discharge from [facility]. Please be advised that this patient has been discharge from our facility. Below you will find the admission and discharge date and time including the patient’s disposition.   UTILIZATION REVIEW CONTACT:  Shyla Garcia  Utilization   Network Utilization Review Department  Phone: [phone] x carefully listen to the prompts. All voicemails are confidential.  Email: [email or web address]     ADMISSION INFORMATION  PRESENTATION DATE: 8/20/2024 12:13 PM  OBERVATION ADMISSION DATE: N/A  INPATIENT ADMISSION DATE: 8/20/24  2:47 PM   DISCHARGE DATE: 8/23/2024  2:29 PM   DISPOSITION:Home/Self Care    Network Utilization Review Department  ATTENTION: Please call with any questions or concerns to [phone] and carefully listen to the prompts so that you are directed to the right person. All voicemails are confidential.   For Discharge needs, contact Care Management DC Support Team at [phone] opt. 2  Send all requests for admission clinical reviews, approved or denied determinations and any other requests to dedicated fax number below belonging to the campus where the patient is receiving treatment. List of dedicated fax numbers for the Facilities:  FACILITY NAME UR FAX NUMBER   ADMISSION DENIALS (Administrative/Medical Necessity) [phone]   DISCHARGE SUPPORT TEAM ([facility]) [phone]   PARENT CHILD HEALTH (Maternity/NICU/Pediatrics) [phone]   [facility] [phone]   [facility] [phone]   [facility] [phone]   [facility] [phone]   [facility] [phone]   [facility] [phone]   [facility] [phone]   [facility] [phone]    [facility] [phone]   [facility] [phone]   [facility] [phone]   [facility] [phone]

## 2024-08-26 NOTE — PROGRESS NOTES
[facility]  Progress Note  Name: Terrie Alicea I  MRN: [id number]  Unit/Bed#: S -01 I Date of Admission: 8/24/2024   Date of Service: 8/26/2024 I Hospital Day: 2    Assessment & Plan   * Dysphagia  Assessment & Plan  CT neck no organic obstructions in the mediastinum. Pt is s/p open gastrostomy tube placement today.    Plan:  ~48h for tube to mature and gradually introduce tube feeds  Speech eval pending  IV fluids    UTI (urinary tract infection)  Assessment & Plan  Previous culture 8/20-grew E. coli, pansensitive  CT abdomen pelvis demonstrates wall thickening of urinary bladder  Concurrent aspiration pneumonia    Plan  continue ceftriaxone IV 1 g every 24 hours        Mood disturbances  Assessment & Plan  Continue PTA   Abilify 2 mg  Citalopram 40 mg  Trazodone 100 mg      Parkinson's disease  Assessment & Plan  History of mood disturbances, previously on neuroleptics  Tardive dyskinesia on exam    PTA Sinemet       Incontinence  Assessment & Plan  Continue PTA oxybutynin 5 mg    DM (diabetes mellitus) ([facility])  Assessment & Plan  Lab Results   Component Value Date    HGBA1C 6.2 (H) 08/13/2024       Recent Labs     08/25/24  1115 08/25/24  2053 08/26/24  0657 08/26/24  1042   POCGLU 169* 167* 233* 165*         Blood Sugar Average: Last 72 hrs:  (P) [id number]  Plan  Discontinue Lantus  Algorithm 1 sliding scale    Cerebral palsy (HCC)  Assessment & Plan  Continue PTA baclofen 10 mg             VTE Pharmacologic Prophylaxis: VTE Score: 7 High Risk (Score >/= 5) - Pharmacological DVT Prophylaxis Ordered: enoxaparin (Lovenox). Sequential Compression Devices Ordered.    Mobility:   Basic Mobility Inpatient Raw Score: 6  JH-HLM Goal: 2: Bed activities/Dependent transfer  JH-HLM Achieved: 2: Bed activities/Dependent transfer  Patient is nonambulatory at baseline    Patient Centered Rounds: I performed bedside rounds with nursing staff today.   Discussions with Specialists  or Other Care Team Provider:     Education and Discussions with Family / Patient: Updated Lisa (RN caregiver) via telephone.    Total Time Spent on Date of Encounter in care of patient: 30 mins. This time was spent on one or more of the following: performing physical exam; counseling and coordination of care; obtaining or reviewing history; documenting in the medical record; reviewing/ordering tests, medications or procedures; communicating with other healthcare professionals and discussing with patient's family/caregivers.    Current Length of Stay: 2 day(s)  Current Patient Status: Inpatient   Certification Statement: The patient will continue to require additional inpatient hospital stay due to monitoring required s/p gastrostomy tube placement and safe introduction of tube feeds.  Discharge Plan: Anticipate discharge in 48-72 hrs to prior assisted living facility.    Code Status: Level 1 - Full Code    Subjective:   Patient seen and examined at bedside today for continuity of care. Pt is alert, although unable to assess orientation as pt is largely nonverbal and unable to communicate.     Objective:     Vitals:   Temp (24hrs), Av.7 °F (37.1 °C), Min:97.2 °F (36.2 °C), Max:100 °F (37.8 °C)    Temp:  [97.2 °F (36.2 °C)-100 °F (37.8 °C)] 97.5 °F (36.4 °C)  HR:  [] 86  Resp:  [16-21] 20  BP: (111-181)/(68-86) 123/72  SpO2:  [92 %-99 %] 95 %  Body mass index is 19.26 kg/m².     Input and Output Summary (last 24 hours):     Intake/Output Summary (Last 24 hours) at 2024 1820  Last data filed at 2024 1713  Gross per 24 hour   Intake 1600 ml   Output 368 ml   Net 1232 ml       Physical Exam:  Physical Exam  Vitals reviewed.   Constitutional:       Appearance: She is ill-appearing.   HENT:      Head: Normocephalic.      Right Ear: External ear normal.      Left Ear: External ear normal.      Nose: Nose normal.      Mouth/Throat:      Pharynx: Oropharynx is clear.   Eyes:       Extraocular Movements: Extraocular movements intact.      Conjunctiva/sclera: Conjunctivae normal.   Cardiovascular:      Rate and Rhythm: Normal rate and regular rhythm.      Pulses: Normal pulses.      Heart sounds: Normal heart sounds.   Pulmonary:      Effort: Pulmonary effort is normal.      Breath sounds: Normal breath sounds.   Abdominal:      General: Bowel sounds are normal.      Palpations: Abdomen is soft.   Musculoskeletal:         General: Deformity (R arm, baseline) present.      Cervical back: Normal range of motion.   Skin:     General: Skin is warm.      Capillary Refill: Capillary refill takes less than 2 seconds.   Neurological:      Mental Status: She is alert. Mental status is at baseline.          Additional Data:     Labs:  Results from last 7 days   Lab Units 08/26/24  0451 08/25/24  0344 08/24/24  0951   WBC Thousand/uL 8.35   < > 5.85   HEMOGLOBIN g/dL 10.0*   < > 10.0*   HEMATOCRIT % 31.1*   < > 30.5*   PLATELETS Thousands/uL 239   < > 153   SEGS PCT %  --   --  67   LYMPHO PCT %  --   --  20   MONO PCT %  --   --  9   EOS PCT %  --   --  2    < > = values in this interval not displayed.     Results from last 7 days   Lab Units 08/26/24  0451 08/25/24  0344 08/24/24  0951   SODIUM mmol/L 143   < > 140   POTASSIUM mmol/L 4.3   < > 4.7   CHLORIDE mmol/L 103   < > 102   CO2 mmol/L 29   < > 34*   BUN mg/dL 12   < > 16   CREATININE mg/dL 0.86   < > 0.96   ANION GAP mmol/L 11   < > 4   CALCIUM mg/dL 9.5   < > 9.5   ALBUMIN g/dL  --   --  3.4*   TOTAL BILIRUBIN mg/dL  --   --  0.28   ALK PHOS U/L  --   --  88   ALT U/L  --   --  49   AST U/L  --   --  17   GLUCOSE RANDOM mg/dL 179*   < > 427*    < > = values in this interval not displayed.     Results from last 7 days   Lab Units 08/20/24  1349   INR  0.94     Results from last 7 days   Lab Units 08/26/24  1042 08/26/24  0657 08/25/24  2053 08/25/24  1115 08/25/24  0748 08/25/24  0707 08/24/24  2107 08/23/24  1318 08/23/24  1218 08/23/24  1142  08/23/24  1003 08/21/24  0429   POC GLUCOSE mg/dl 165* 233* 167* 169* 172* 52* 264* 182* 229* 296* 399* 99         Results from last 7 days   Lab Units 08/26/24  0451 08/25/24  0344 08/24/24  1740 08/23/24  0522 08/22/24  0808 08/21/24  0442 08/20/24  1727 08/20/24  1425 08/20/24  1239   LACTIC ACID mmol/L  --   --   --   --   --  1.1 2.3* 2.8* 3.9*   PROCALCITONIN ng/ml 0.62* 1.44* 1.66* 3.98* 7.11* 13.29*  --   --  1.80*       Lines/Drains:  Invasive Devices       Peripheral Intravenous Line  Duration             Peripheral IV 08/25/24 Left;Proximal;Ventral (anterior) Forearm <1 day              Drain  Duration             Gastrostomy/Enterostomy  16 Fr. LUQ <1 day                          Imaging: Reviewed radiology reports from this admission including: chest CT scan    Recent Cultures (last 7 days):   Results from last 7 days   Lab Units 08/20/24  1303 08/20/24  1239   BLOOD CULTURE   --  No Growth After 5 Days.  No Growth After 5 Days.   URINE CULTURE  >100,000 cfu/ml Escherichia coli*  --        Last 24 Hours Medication List:   Current Facility-Administered Medications   Medication Dose Route Frequency Provider Last Rate    acetaminophen  650 mg Oral Q4H PRN Rosalino P Allsbrook, DO      ammonium lactate   Topical BID PRN Rosalino P Allsbrook, DO      ARIPiprazole  2 mg Oral HS Rosalino P Allsbrook, DO      artificial tear   Both Eyes HS PRN Rosalino P Allsbrook, DO      vitamin C  1,000 mg Oral Daily Rosalino P Allsbrook, DO      baclofen  10 mg Oral TID Rosalino P Allsbrook, DO      benzonatate  100 mg Oral TID PRN Rosalino P Allsbrook, DO      calcium carbonate  750 mg Oral BID Rosalino P Allsbrook, DO      carbamide peroxide  2 drop Both Ears BID PRN Rosalino P Allsbrook, DO      carbidopa-levodopa  2 tablet Oral BID Rosalino P Allsbrook, DO      cefazolin  2,000 mg Intravenous On Call To OR Rosalino P Allsbrook, DO      cefTRIAXone  1,000 mg Intravenous Q24H Rosalino ESPINOSA Allsbrook, DO 1,000 mg (08/25/24 2255)     cholecalciferol  1,000 Units Oral Daily Rosalino P Allsbrook, DO      citalopram  40 mg Oral Daily Rosalino P Allsbrook, DO      docosanol   Topical 5x Daily Rosalino P Allsbrook, DO      enoxaparin  40 mg Subcutaneous Daily Rosalino P Allsbrook, DO      Ferrous Sulfate  300 mg Oral BID AC Rosalino P Allsbrook, DO      fluticasone  1 spray Nasal Daily PRN Rosalino P Allsbrook, DO      guaiFENesin  200 mg Per G Tube 4x Daily PRN Rosalino P Allsbrook, DO      hydrocortisone   Topical 4x Daily PRN Rosalino P Allsbrook, DO      insulin lispro  1-5 Units Subcutaneous 4 times day Rosalino P Allsbrook, DO      insulin lispro  1-5 Units Subcutaneous HS Rosalino P Allsbrook, DO      Ketotifen Fumarate  1 drop Both Eyes Daily PRN Rosalino P Allsbrook, DO      lactated ringers  75 mL/hr Intravenous Continuous Rosalino P Allsbrook, DO Stopped (08/26/24 1713)    lactated ringers  125 mL/hr Intravenous Continuous Ramon Galicia MD      levothyroxine  25 mcg Per G Tube QAM Rosalino P Allsbrook, DO      Loratadine  10 mg Oral Daily PRN Rosalino P Allsbrook, DO      LORazepam  0.5 mg Oral Daily Rosalino P Allsbrook, DO      methenamine hippurate  1 g Oral BID Rosalino P Allsbrook, DO      metroNIDAZOLE  500 mg Intravenous On Call To OR Rosalino P Allsbrook, DO      omeprazole (PRILOSEC) suspension 2 mg/mL  20 mg Oral Daily Rosalino P Allsbrook, DO      oxybutynin  5 mg Oral TID Rosalino P Allsbrook, DO      oxyCODONE  2.5 mg Per G Tube Q4H PRN Rosalino P Allsbrook, DO      oxyCODONE  5 mg Per G Tube Q4H PRN Rosalino P Allsbrook, DO      polyethylene glycol  17 g Oral Daily Rosalino P Allsbrook, DO      pravastatin  40 mg Oral Daily With Dinner Rosalino P Allsbrook, DO      traZODone  100 mg Oral HS Rosalino P Allsbrook, DO      valACYclovir  1,000 mg Oral Q12H ROSEMARY Rosalino P Allsbrook, DO          Today, Patient Was Seen By: Silvana Miek DO  PGY-1    **Please Note: This note may have been constructed using a voice recognition system.**

## 2024-08-26 NOTE — OP NOTE
OPERATIVE REPORT  PATIENT NAME: Terrie Alicea    :  1960  MRN: [id number]  Pt Location: AN OR ROOM 01    SURGERY DATE: 2024    Surgeons and Role:     * Monroe Jean, DO - Primary     * Rosalino Szymanski, DO - Assisting    Preop Diagnosis:  Decreased oral intake [R63.8]  Oropharyngeal dysphagia [R13.12]    Post-Op Diagnosis Codes:     * Decreased oral intake [R63.8]     * Oropharyngeal dysphagia [R13.12]    Procedure(s):  INSERTION GASTROSTOMY TUBE OPEN    Specimen(s):  * No specimens in log *    Estimated Blood Loss:   Minimal    Drains:  Gastrostomy/Enterostomy  16 Fr. LUQ (Active)   Number of days: 0       Anesthesia Type:   General    Operative Indications:  Decreased oral intake [R63.8]  Oropharyngeal dysphagia [R13.12]      Operative Findings:  -Abel gastrostomy, 16F G tube  -G tube 3.5cm at skin, 4cm at bumper      Complications:   None    Procedure and Technique:  Patient was met and identified in the preop holding area by name and patient identification bracelet. She was brought to the OR where general anesthesia was induced. Preoperative abx were administered. The abdomen was prepped and draped in usual sterile fashion. A time-out was called and all parties were in agreement.    We began by making a small upper midline incision. This was taken down through the subcutaneous tissues and fascia with electrocautery. The peritoneum was identified and was entered with cautery. The abdomen was opened along the length of the incision over a gloved finger, care taken to avoid the underlying viscera. The stomach was identified and grasped with a lester. Our gastrostomy site was chosen in the body of the stomach. We first placed two purse string sutures around the site with 2-0 silk suture. A gastrotomy was made and a 16F pre-tested G tube was placed through the LUQ into our gastrotomy. The balloon was filled with 10cc of water and pulled to the gastric wall. We then tied our pursestring  sutures to secure our gastrostomy tube. Using each of the purse string sutures we started our gastropexy by securing these posteriorly to the abdominal wall. An additional 2-0 silk suture was placed anteriorly taking a seromuscular bite of the stomach and anchoring it to the abdominal wall. This secured our stomach at 3 sites. At this point the abdomen was irrigated. The fascia was closed using a running #1 nonlooped PDS. The subcutaneous tissue was closed with interrupted 2-0 vicryl suture. Skin closed with 4-0 monocryl subcuticular suture and skin glue. Our gastrotomy tube bumper was secured with interrupted 2-0 nylon suture. An abdominal binder was placed. Instrument, needle, and sponge counts were correct and confirmed by RF wanding. Patient was extubated and brought to PACU in stable condition.    Dr. Jean was present for the entire procedure.         Patient Disposition:  PACU         SIGNATURE: Rosalino Szymanski DO  DATE: August 26, 2024  TIME: 6:27 PM

## 2024-08-26 NOTE — ASSESSMENT & PLAN NOTE
Lab Results   Component Value Date    HGBA1C 6.2 (H) 08/13/2024       Recent Labs     08/26/24  1042 08/26/24  1840 08/27/24  0724 08/27/24  1146   POCGLU 165* 284* 243* 186*       Blood Sugar Average: Last 72 hrs:  (P) 193.5  Plan  Sliding scale: insulin lispro 1-5 units  Q6h

## 2024-08-26 NOTE — ASSESSMENT & PLAN NOTE
CT neck no organic obstructions in the mediastinum. Pt is POD1 s/p open gastrostomy tube placement.    Plan:  Per gen surg recommendations: can begin trickle feeds today. Per nutrition - goal rate is 70 cc/hr.  Initiated Jevity 1.5 at 10 cc today. Increase by 10 cc q 8h until goal rate is achieved. Water flush 100 mL q4h.

## 2024-08-26 NOTE — SPEECH THERAPY NOTE
Speech Language/Pathology    Speech/Language Pathology Cancel Note    Patient Name: Terrie Alicea  Today's Date: 8/26/2024     Problem List  Principal Problem:    UTI (urinary tract infection)  Active Problems:    Cerebral palsy (HCC)    DM (diabetes mellitus) (HCC)    Incontinence    Dysphagia    Parkinson's disease    Mood disturbances     pt NPO for open G tube by surgery today. Will follow up as needed post procedure.       Sade Hirsch MA CCC-SLP  Speech Pathologist  Available via [employer]

## 2024-08-26 NOTE — CONSULTS
Open G tube scheduled. Recs placed 8/8/24 per OP RD as below.      Patient resides in a group home. Tube feedings will need to be run overnight from 6pm-6am as they do not have staff available during the daytime who can administer bolus feeds.     The group home has a dietitian consultant who completes quarterly reviews on all residents and can help to monitor an established enteral nutrition pt. Facility prefers to provide water flushes with medications; pt receives meds at 6am, 4pm, 8pm.     Recommendations:  Feeds to be administered via pump from 6pm-6am.   Recommend Jevity 1.5 formula, start at 20mL/hr, advance rate by 10mL/hr every 4 hours to goal rate of 70mL/hr x12 hrs.   At goal, will provide 1260 kcals, 54g protein, 638mL water in TF formula  Recommend 60mL water flush before and after TF cycle, and an additional 500mL water daily that can be divided amongst med passes.

## 2024-08-27 LAB
ANION GAP SERPL CALCULATED.3IONS-SCNC: 14 MMOL/L (ref 4–13)
BASOPHILS # BLD AUTO: 0.03 THOUSANDS/ΜL (ref 0–0.1)
BASOPHILS NFR BLD AUTO: 0 % (ref 0–1)
BUN SERPL-MCNC: 16 MG/DL (ref 5–25)
CALCIUM SERPL-MCNC: 8.2 MG/DL (ref 8.4–10.2)
CHLORIDE SERPL-SCNC: 104 MMOL/L (ref 96–108)
CO2 SERPL-SCNC: 18 MMOL/L (ref 21–32)
CREAT SERPL-MCNC: 0.82 MG/DL (ref 0.6–1.3)
EOSINOPHIL # BLD AUTO: 0 THOUSAND/ΜL (ref 0–0.61)
EOSINOPHIL NFR BLD AUTO: 0 % (ref 0–6)
ERYTHROCYTE [DISTWIDTH] IN BLOOD BY AUTOMATED COUNT: 12.9 % (ref 11.6–15.1)
GFR SERPL CREATININE-BSD FRML MDRD: 76 ML/MIN/1.73SQ M
GLUCOSE SERPL-MCNC: 186 MG/DL (ref 65–140)
GLUCOSE SERPL-MCNC: 203 MG/DL (ref 65–140)
GLUCOSE SERPL-MCNC: 223 MG/DL (ref 65–140)
GLUCOSE SERPL-MCNC: 232 MG/DL (ref 65–140)
GLUCOSE SERPL-MCNC: 243 MG/DL (ref 65–140)
HCT VFR BLD AUTO: 29.5 % (ref 34.8–46.1)
HGB BLD-MCNC: 9.4 G/DL (ref 11.5–15.4)
IMM GRANULOCYTES # BLD AUTO: 0.07 THOUSAND/UL (ref 0–0.2)
IMM GRANULOCYTES NFR BLD AUTO: 1 % (ref 0–2)
LACTATE SERPL-SCNC: 1.5 MMOL/L (ref 0.5–2)
LYMPHOCYTES # BLD AUTO: 1.47 THOUSANDS/ΜL (ref 0.6–4.47)
LYMPHOCYTES NFR BLD AUTO: 19 % (ref 14–44)
MCH RBC QN AUTO: 32.9 PG (ref 26.8–34.3)
MCHC RBC AUTO-ENTMCNC: 31.9 G/DL (ref 31.4–37.4)
MCV RBC AUTO: 103 FL (ref 82–98)
MONOCYTES # BLD AUTO: 0.61 THOUSAND/ΜL (ref 0.17–1.22)
MONOCYTES NFR BLD AUTO: 8 % (ref 4–12)
NEUTROPHILS # BLD AUTO: 5.77 THOUSANDS/ΜL (ref 1.85–7.62)
NEUTS SEG NFR BLD AUTO: 72 % (ref 43–75)
NRBC BLD AUTO-RTO: 0 /100 WBCS
PLATELET # BLD AUTO: 206 THOUSANDS/UL (ref 149–390)
PMV BLD AUTO: 11.1 FL (ref 8.9–12.7)
POTASSIUM SERPL-SCNC: 5 MMOL/L (ref 3.5–5.3)
PROCALCITONIN SERPL-MCNC: 0.58 NG/ML
RBC # BLD AUTO: 2.86 MILLION/UL (ref 3.81–5.12)
SODIUM SERPL-SCNC: 136 MMOL/L (ref 135–147)
WBC # BLD AUTO: 7.95 THOUSAND/UL (ref 4.31–10.16)

## 2024-08-27 PROCEDURE — 82948 REAGENT STRIP/BLOOD GLUCOSE: CPT

## 2024-08-27 PROCEDURE — 83605 ASSAY OF LACTIC ACID: CPT | Performed by: INTERNAL MEDICINE

## 2024-08-27 PROCEDURE — 84145 PROCALCITONIN (PCT): CPT

## 2024-08-27 PROCEDURE — 85025 COMPLETE CBC W/AUTO DIFF WBC: CPT

## 2024-08-27 PROCEDURE — 99024 POSTOP FOLLOW-UP VISIT: CPT | Performed by: SURGERY

## 2024-08-27 PROCEDURE — 80048 BASIC METABOLIC PNL TOTAL CA: CPT

## 2024-08-27 PROCEDURE — 99232 SBSQ HOSP IP/OBS MODERATE 35: CPT | Performed by: HOSPITALIST

## 2024-08-27 RX ORDER — INSULIN LISPRO 100 [IU]/ML
1-5 INJECTION, SOLUTION INTRAVENOUS; SUBCUTANEOUS EVERY 6 HOURS
Status: DISCONTINUED | OUTPATIENT
Start: 2024-08-27 | End: 2024-08-28

## 2024-08-27 RX ORDER — INSULIN LISPRO 100 [IU]/ML
1-5 INJECTION, SOLUTION INTRAVENOUS; SUBCUTANEOUS EVERY 6 HOURS
Status: CANCELLED | OUTPATIENT
Start: 2024-08-27

## 2024-08-27 RX ADMIN — CARBIDOPA AND LEVODOPA 2 TABLET: 25; 100 TABLET, EXTENDED RELEASE ORAL at 10:04

## 2024-08-27 RX ADMIN — BACLOFEN 10 MG: 10 TABLET ORAL at 21:41

## 2024-08-27 RX ADMIN — DOCOSANOL: 100 CREAM TOPICAL at 21:43

## 2024-08-27 RX ADMIN — INSULIN LISPRO 1 UNITS: 100 INJECTION, SOLUTION INTRAVENOUS; SUBCUTANEOUS at 16:37

## 2024-08-27 RX ADMIN — DOCOSANOL: 100 CREAM TOPICAL at 17:56

## 2024-08-27 RX ADMIN — LEVOTHYROXINE SODIUM 25 MCG: 25 TABLET ORAL at 09:56

## 2024-08-27 RX ADMIN — METHENAMINE HIPPURATE 1 G: 1 TABLET ORAL at 17:56

## 2024-08-27 RX ADMIN — Medication 1000 UNITS: at 09:56

## 2024-08-27 RX ADMIN — OXYBUTYNIN CHLORIDE 5 MG: 5 TABLET ORAL at 21:41

## 2024-08-27 RX ADMIN — CEFTRIAXONE SODIUM 1000 MG: 10 INJECTION, POWDER, FOR SOLUTION INTRAVENOUS at 17:50

## 2024-08-27 RX ADMIN — BACLOFEN 10 MG: 10 TABLET ORAL at 10:03

## 2024-08-27 RX ADMIN — VALACYCLOVIR 1000 MG: 500 TABLET, FILM COATED ORAL at 09:55

## 2024-08-27 RX ADMIN — DOCOSANOL: 100 CREAM TOPICAL at 06:00

## 2024-08-27 RX ADMIN — INSULIN LISPRO 1 UNITS: 100 INJECTION, SOLUTION INTRAVENOUS; SUBCUTANEOUS at 21:29

## 2024-08-27 RX ADMIN — CALCIUM CARBONATE (ANTACID) CHEW TAB 500 MG 750 MG: 500 CHEW TAB at 21:41

## 2024-08-27 RX ADMIN — INSULIN LISPRO 2 UNITS: 100 INJECTION, SOLUTION INTRAVENOUS; SUBCUTANEOUS at 07:46

## 2024-08-27 RX ADMIN — BACLOFEN 10 MG: 10 TABLET ORAL at 17:51

## 2024-08-27 RX ADMIN — DOCOSANOL: 100 CREAM TOPICAL at 13:33

## 2024-08-27 RX ADMIN — ARIPIPRAZOLE 2 MG: 2 TABLET ORAL at 21:41

## 2024-08-27 RX ADMIN — FERROUS SULFATE 300 MG: 300 SOLUTION ORAL at 05:55

## 2024-08-27 RX ADMIN — OXYBUTYNIN CHLORIDE 5 MG: 5 TABLET ORAL at 09:55

## 2024-08-27 RX ADMIN — PRAVASTATIN SODIUM 40 MG: 40 TABLET ORAL at 17:50

## 2024-08-27 RX ADMIN — LORAZEPAM 0.5 MG: 0.5 TABLET ORAL at 21:28

## 2024-08-27 RX ADMIN — VALACYCLOVIR 1000 MG: 500 TABLET, FILM COATED ORAL at 21:40

## 2024-08-27 RX ADMIN — OXYCODONE HYDROCHLORIDE AND ACETAMINOPHEN 1000 MG: 500 TABLET ORAL at 09:55

## 2024-08-27 RX ADMIN — ENOXAPARIN SODIUM 40 MG: 40 INJECTION SUBCUTANEOUS at 09:55

## 2024-08-27 RX ADMIN — CITALOPRAM HYDROBROMIDE 40 MG: 20 TABLET ORAL at 09:56

## 2024-08-27 RX ADMIN — CARBIDOPA AND LEVODOPA 2 TABLET: 25; 100 TABLET, EXTENDED RELEASE ORAL at 17:50

## 2024-08-27 RX ADMIN — SODIUM CHLORIDE, SODIUM LACTATE, POTASSIUM CHLORIDE, AND CALCIUM CHLORIDE 75 ML/HR: .6; .31; .03; .02 INJECTION, SOLUTION INTRAVENOUS at 13:29

## 2024-08-27 RX ADMIN — METHENAMINE HIPPURATE 1 G: 1 TABLET ORAL at 13:30

## 2024-08-27 RX ADMIN — DOCOSANOL: 100 CREAM TOPICAL at 13:32

## 2024-08-27 RX ADMIN — TRAZODONE HYDROCHLORIDE 100 MG: 100 TABLET ORAL at 21:28

## 2024-08-27 RX ADMIN — OXYBUTYNIN CHLORIDE 5 MG: 5 TABLET ORAL at 17:50

## 2024-08-27 NOTE — PROGRESS NOTES
[facility]  Progress Note  Name: Terrie Alicea I  MRN: [id number]  Unit/Bed#: S -01 I Date of Admission: 8/24/2024   Date of Service: 8/27/2024 I Hospital Day: 3    Assessment & Plan   * Dysphagia  Assessment & Plan  CT neck no organic obstructions in the mediastinum. Pt is POD1 s/p open gastrostomy tube placement.    Plan:  Per gen surg recommendations: can begin trickle feeds today. Per nutrition - goal rate is 70 cc/hr.  Initiated Jevity 1.5 at 10 cc today. Increase by 10 cc q 8h until goal rate is achieved. Water flush 100 mL q4h.    UTI (urinary tract infection)  Assessment & Plan  Previous culture 8/20-grew E. coli, pansensitive  CT abdomen pelvis demonstrates wall thickening of urinary bladder  Concurrent aspiration pneumonia    Plan:  Continue IV Rocephin 1g/50mL every 24 hours        Mood disturbances  Assessment & Plan  Continue PTA   Abilify 2 mg  Citalopram 40 mg  Trazodone 100 mg      Parkinson's disease  Assessment & Plan  History of mood disturbances, previously on neuroleptics  Tardive dyskinesia on exam    Pt previously on Sinemet  ER 2 tablets BID, which cannot be crushed to deliver via g-tube. Per pt's caregiver, pt had been receiving Sinemet ER crushed so it is unclear how much active medication pt was truly receiving d/t damage of formulation. Consider dosage adjustment when converting to Sinemet  IR.     Incontinence  Assessment & Plan  Continue oxybutynin 5 mg TID    DM (diabetes mellitus) ([facility])  Assessment & Plan  Lab Results   Component Value Date    HGBA1C 6.2 (H) 08/13/2024       Recent Labs     08/26/24  1042 08/26/24  1840 08/27/24  0724 08/27/24  1146   POCGLU 165* 284* 243* 186*       Blood Sugar Average: Last 72 hrs:  (P) 193.5  Plan  Sliding scale: insulin lispro 1-5 units  Q6h    Cerebral palsy (HCC)  Assessment & Plan  Continue baclofen 10 mg TID         VTE Pharmacologic Prophylaxis: VTE Score: 7 High Risk (Score >/= 5) -  Pharmacological DVT Prophylaxis Ordered: enoxaparin (Lovenox). Sequential Compression Devices Ordered.    Mobility:   Basic Mobility Inpatient Raw Score: 6  JH-HLM Goal: 2: Bed activities/Dependent transfer  JH-HLM Achieved: 2: Bed activities/Dependent transfer  JH-HLM Goal achieved. Continue to encourage appropriate mobility.    Patient Centered Rounds: I performed bedside rounds with nursing staff today.  Discussions with Specialists or Other Care Team Provider: Called pharmacy to discuss Sinemet dosage & formulation considerations.    Education and Discussions with Family / Patient: Updated , Lisa (RN caregiver) via telephone. Lisa stated the tube feeds she received were Jevity 1.5, consistent with what pt is receiving in hospital now.     Current Length of Stay: 3 day(s)  Current Patient Status: Inpatient   Discharge Plan: Anticipate discharge in 48-72 hrs to prior assisted living facility once goal rate of tube feeds are achieved.    Code Status: Level 1 - Full Code    Subjective:   Patient seen and examined at bedside today. Per nursing report, no acute events overnight. Pt is POD1 s/p G-tube placement. At the time of this encounter, pt is alert and awake. Pt nods when asked if feeling okay. No acute concerns observed on physical exam.        Objective:     Vitals:   Temp (24hrs), Av.1 °F (36.7 °C), Min:97.1 °F (36.2 °C), Max:99.4 °F (37.4 °C)    Temp:  [97.1 °F (36.2 °C)-99.4 °F (37.4 °C)] 98.1 °F (36.7 °C)  HR:  [56-88] 73  Resp:  [12-21] 16  BP: (104-181)/(50-88) 104/50  SpO2:  [94 %-99 %] 97 %  Body mass index is 19.26 kg/m².     Input and Output Summary (last 24 hours):     Intake/Output Summary (Last 24 hours) at 2024 1704  Last data filed at 2024 1016  Gross per 24 hour   Intake 700 ml   Output --   Net 700 ml       Physical Exam:   Physical Exam  Vitals reviewed.   Constitutional:       Appearance: She is underweight.   HENT:      Head: Normocephalic and atraumatic.       Right Ear: External ear normal.      Left Ear: External ear normal.   Eyes:      Extraocular Movements: Extraocular movements intact.      Conjunctiva/sclera: Conjunctivae normal.   Cardiovascular:      Rate and Rhythm: Normal rate and regular rhythm.      Pulses: Normal pulses.      Heart sounds: Normal heart sounds.   Pulmonary:      Effort: No respiratory distress.   Musculoskeletal:         General: Deformity (R arm, baseline) present. Normal range of motion.      Cervical back: Neck supple. No rigidity.   Skin:     General: Skin is warm and dry.      Capillary Refill: Capillary refill takes less than 2 seconds.   Neurological:      Mental Status: She is alert. Mental status is at baseline.         Additional Data:     Labs:  Results from last 7 days   Lab Units 08/27/24  0538   WBC Thousand/uL 7.95   HEMOGLOBIN g/dL 9.4*   HEMATOCRIT % 29.5*   PLATELETS Thousands/uL 206   SEGS PCT % 72   LYMPHO PCT % 19   MONO PCT % 8   EOS PCT % 0     Results from last 7 days   Lab Units 08/27/24  0538 08/25/24  0344 08/24/24  0951   SODIUM mmol/L 136   < > 140   POTASSIUM mmol/L 5.0   < > 4.7   CHLORIDE mmol/L 104   < > 102   CO2 mmol/L 18*   < > 34*   BUN mg/dL 16   < > 16   CREATININE mg/dL 0.82   < > 0.96   ANION GAP mmol/L 14*   < > 4   CALCIUM mg/dL 8.2*   < > 9.5   ALBUMIN g/dL  --   --  3.4*   TOTAL BILIRUBIN mg/dL  --   --  0.28   ALK PHOS U/L  --   --  88   ALT U/L  --   --  49   AST U/L  --   --  17   GLUCOSE RANDOM mg/dL 232*   < > 427*    < > = values in this interval not displayed.         Results from last 7 days   Lab Units 08/27/24  1627 08/27/24  1146 08/27/24  0724 08/26/24  1840 08/26/24  1042 08/26/24  0657 08/25/24  2053 08/25/24  1115 08/25/24  0748 08/25/24  0707 08/24/24  2107 08/23/24  1318   POC GLUCOSE mg/dl 203* 186* 243* 284* 165* 233* 167* 169* 172* 52* 264* 182*         Results from last 7 days   Lab Units 08/27/24  1151 08/27/24  0538 08/26/24  0451 08/25/24  0344 08/24/24  1740  08/23/24  0522 08/22/24  0808 08/21/24  0442 08/20/24  1727   LACTIC ACID mmol/L 1.5  --   --   --   --   --   --  1.1 2.3*   PROCALCITONIN ng/ml  --  0.58* 0.62* 1.44* 1.66* 3.98*   < > 13.29*  --     < > = values in this interval not displayed.       Lines/Drains:  Invasive Devices       Peripheral Intravenous Line  Duration             Peripheral IV 08/25/24 Left;Proximal;Ventral (anterior) Forearm 1 day              Drain  Duration             Gastrostomy/Enterostomy  16 Fr. LUQ 1 day                          Imaging: Reviewed radiology reports from this admission including: abdominal/pelvic CT, xray(s), and procedure reports    Recent Cultures (last 7 days):         Last 24 Hours Medication List:   Current Facility-Administered Medications   Medication Dose Route Frequency Provider Last Rate    acetaminophen  650 mg Oral Q4H PRN Rosalino P Allsbrook, DO      ammonium lactate   Topical BID PRN Rosalino P Allsbrook, DO      ARIPiprazole  2 mg Per G Tube HS Reg Canada MD      artificial tear   Both Eyes HS PRN Rosalino P Allsbrook, DO      vitamin C  1,000 mg Per G Tube Daily Reg Canada MD      baclofen  10 mg Per G Tube TID Reg Canada MD      calcium carbonate  750 mg Oral BID Rosalino P Allsbrook, DO      carbamide peroxide  2 drop Both Ears BID PRN Rosalino P Allsbrook, DO      carbidopa-levodopa  2 tablet Oral BID Rosalino P Allsbrook, DO      cefTRIAXone  1,000 mg Intravenous Q24H Katie Poole MD      cholecalciferol  1,000 Units Per G Tube Daily Reg Canada MD      citalopram  40 mg Per G Tube Daily Reg [place] MD      dextromethorphan-guaiFENesin  10 mL Per G Tube Q4H PRN Reg Canada MD      docosanol   Topical 5x Daily Rosalino P Allsbrook, DO      enoxaparin  40 mg Subcutaneous Daily Rosalino P Allsbrook, DO      Ferrous Sulfate  300 mg Oral BID AC Rosalino P Allsbrook, DO      fluticasone  1 spray Nasal Daily PRN Rosalino P Allsbrook, DO      hydrocortisone    Topical 4x Daily PRN Rosalino P Allsbrook, DO      insulin lispro  1-5 Units Subcutaneous Q6H Katie Poole MD      Ketotifen Fumarate  1 drop Both Eyes Daily PRN Rosalino P Allsbrook, DO      levothyroxine  25 mcg Per G Tube QAM Rosalino P Allsbrook, DO      Loratadine  10 mg Oral Daily PRN Rosalino P Allsbrook, DO      LORazepam  0.5 mg Per G Tube Daily Reg Canada MD      methenamine hippurate  1 g Oral BID Reg Canada MD      omeprazole (PRILOSEC) suspension 2 mg/mL  20 mg Oral Daily Rosalino P Allsbrook, DO      oxybutynin  5 mg Per G Tube TID Reg Canada MD      oxyCODONE  2.5 mg Per G Tube Q4H PRN Rosalino P Allsbrook, DO      oxyCODONE  5 mg Per G Tube Q4H PRN Rosalino P Allsbrook, DO      polyethylene glycol  17 g Oral Daily Rosalino P Allsbrook, DO      pravastatin  40 mg Per G Tube Daily With Dinner Reg Canada MD      traZODone  100 mg Per G Tube HS Reg Canada MD      valACYclovir  1,000 mg Per G Tube Q12H [facility] Reg Canada MD          Today, Patient Was Seen By: Silvana Mike DO  PGY-1    **Please Note: This note may have been constructed using a voice recognition system.**

## 2024-08-27 NOTE — SPEECH THERAPY NOTE
Speech Language/Pathology    Speech/Language Pathology Cancel Note    Patient Name: Terrie Alicea  Today's Date: 8/27/2024     Problem List  Principal Problem:    Dysphagia  Active Problems:    Cerebral palsy (HCC)    UTI (urinary tract infection)    DM (diabetes mellitus) (HCC)    Incontinence    Parkinson's disease    Mood disturbances     pt now s/p G tube placement, remains NPO, can do pleasure feeds of puree & HTL if desired, although pt does not accept po feeds easily.   Will sign off, reconsult if needed      Sade Hirsch MA CCC-SLP  Speech Pathologist  Available via [employer]

## 2024-08-27 NOTE — CASE MANAGEMENT
Case Management Assessment & Discharge Planning Note    Patient name Terrie Alicea  Location S /S -01 MRN [id number]  : 1960 Date 2024       Current Admission Date: 2024  Current Admission Diagnosis:Dysphagia   Patient Active Problem List    Diagnosis Date Noted Date Diagnosed    Mood disturbances 2024     Severe protein-calorie malnutrition (HCC) 2024     Functional quadriplegia (HCC) 2024     Protein-calorie malnutrition, unspecified severity (HCC) 2024     Hospital discharge follow-up 2024     SIRS (systemic inflammatory response syndrome) ([facility]) 2024     Developmental delay      Preop examination 2023     Cataract 2023     Internal hemorrhoids 2023     Contracture of right hand 2023     Need for shingles vaccine 2023     Viral illness 2022     Dystonic cerebral palsy ([facility]) 2022     Cervical dystonia 2022     Parkinson's disease 2022     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[facility]) 10/18/2021     Polyneuropathy associated with underlying disease ([facility]) 10/18/2021     Hypercalcemia 2021     Herpes zoster without complication 2021     Abnormal finding on lung imaging 2021     Anemia 2020     Eosinophilic leukocytosis 2020     Underweight 2020     Dysphagia 2020     History of vitamin D deficiency 2020     History of fall 10/28/2019     Breast asymmetry 2019     Hypernatremia 2018     Severe sepsis ([facility]) 2018     Gait disturbance 2018     Osteoarthritis of both hips 2018     Severe Intellectual disability 2018     DM (diabetes mellitus) ([facility]) 2018     Abnormal albumin 2017     Peripheral neuropathy 2017     Seasonal allergies 2017     Physical deconditioning 2017     Hyperlipidemia 2017     Gastroesophageal reflux disease 2017     Cerebral palsy ([facility]) 2017     Spasticity 2017      Ambulatory dysfunction 03/27/2017     UTI (urinary tract infection) 03/27/2017     Bipolar disorder (HCC) 03/27/2017     Hypotension 04/11/2016     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/2016     Weight loss 01/21/2016     Osteoporosis 01/19/2016     Resting tremor 01/19/2016     Acute metabolic encephalopathy 12/11/2015     Gait abnormality 12/11/2015     Platelets decreased (HCC) 07/31/2015     Other chronic pain 07/30/2015     Postmenopausal vaginal bleeding 07/23/2015     Anxiety 07/15/2015     Menopause 09/25/2014     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/28/2014     Atopic dermatitis 06/02/2014     Dry eye 06/02/2014     Constipation 04/12/2013     Iron deficiency anemia 03/27/2013     Incontinence 03/27/2013     Hypothyroidism 01/10/2013       LOS (days): 3  Geometric Mean LOS (GMLOS) (days):   Days to GMLOS:     OBJECTIVE:  PATIENT READMITTED TO HOSPITAL  Risk of Unplanned Readmission Score: 34         Current admission status: Inpatient       Preferred Pharmacy:   [facility] - [address]  [address]  [address]  [facility] [place] [facility] [address]  Phone: [phone] Fax: [phone]    [facility]  [address]  [place] [address]  Phone: [phone] Fax: [phone]    [facility] - [place], [place] - [address]  [address] [address]  Phone: [phone] Fax: [phone]    Primary Care Provider: Gunnar Sylvester DO    Primary Insurance: [employer] [facility] RE[employer]  Secondary Insurance: [place] MEDICAL [employer]ANCE    ASSESSMENT:  Active Health Care Proxies       Ella Morgan  [facility] Care Representative - Other   Primary Phone: [phone] (Mobile)  Work Phone: [phone]                           Readmission Root Cause  30 Day Readmission: Yes  Who directed you to return to the hospital?: Other (comment)  Did you understand whom to contact if you had questions or problems?: Yes  Did  you get your prescriptions before you left the hospital?: No  Reason:: Preference for own pharmacy  Were you able to get your prescriptions filled when you left the hospital?: Yes  Did you take your medications as prescribed?: Yes  Were you able to get to your follow-up appointments?: No  Reason:: Readmitted prior to appointment  During previous admission, was a post-acute recommendation made?: No  Patient was readmitted due to: [facility] mental retardation, functional quadriplegia, dysphagia and cerebral palsy presenting to the emergency department due to lethargy and unable to take her medications.  Patient was just discharged from hospital yesterday after being treated for UTI/pneumonia.  Since being at the group home she was unable to take her medications and was lethargic per group home.  Action Plan: Surgery consult for G tube placement    Patient Information  Admitted from:: Facility  Mental Status: Other (Comment) (IDD)  During Assessment patient was accompanied by: Not accompanied during assessment  Assessment information provided by:: Other - please comment (Lisa - Group Home)  Primary Caregiver: Other (Comment)  Caregiver's Name:: [facility]  Caregiver's Relationship to Patient:: Other (Specify)  Caregiver's Telephone Number:: [phone]  Support Systems: Home care staff  County of Residence: [place]  What city do you live in?: [place]  Home entry access options. Select all that apply.: No steps to enter home  Type of Current Residence: Group home  Upon entering residence, is there a bedroom on the main floor (no further steps)?: Yes  Upon entering residence, is there a bathroom on the main floor (no further steps)?: Yes  Living Arrangements: Other (Comment)  Is patient a ?: No    Activities of Daily Living Prior to Admission  Functional Status: Total dependent  Completes ADLs independently?: No  Level of ADL dependence: Total Dependent  Ambulates independently?: No  Level of  ambulatory dependence: Total Dependent  Does patient use assisted devices?: Yes  Assisted Devices (DME) used: Wheelchair, Gerardo lift (Gerardo Lift at day program and stand pivot with assistance at home)  Does patient currently own DME?: Yes  What DME does the patient currently own?: Wheelchair  Does patient have a history of Outpatient Therapy (PT/OT)?: Yes  Does the patient have a history of Short-Term Rehab?: No  Does patient have a history of HHC?: Yes  Does patient currently have HHC?: No         Patient Information Continued  Income Source: SSI/SSD  Does patient have prescription coverage?: Yes  Does patient receive dialysis treatments?: No  Does patient have a history of substance abuse?: No  Does patient have a history of Mental Health Diagnosis?: Yes (Bipolar and Mood Disorders)  Is patient receiving treatment for mental health?: Yes  Has patient received inpatient treatment related to mental health in the last 2 years?: No         Means of Transportation  Means of Transport to App:: Other (Comment) (Group Home)      Social Determinants of Health (SDOH)      Flowsheet Row Most Recent Value   Housing Stability    In the last 12 months, was there a time when you were not able to pay the mortgage or rent on time? N   In the past 12 months, how many times have you moved where you were living? 0   At any time in the past 12 months, were you homeless or living in a shelter (including now)? N   Transportation Needs    In the past 12 months, has lack of transportation kept you from medical appointments or from getting medications? no   In the past 12 months, has lack of transportation kept you from meetings, work, or from getting things needed for daily living? No   Food Insecurity    Within the past 12 months, you worried that your food would run out before you got the money to buy more. Never true   Within the past 12 months, the food you bought just didn't last and you didn't have money to get more. Never true    Utilities    In the past 12 months has the electric, gas, oil, or water company threatened to shut off services in your home? No            DISCHARGE DETAILS:    Discharge planning discussed with:: Lisa Penny (Care Giver)  Freedom of Choice: Yes  Comments - Freedom of Choice: Reviewed DCP of returning to Group Home  CM contacted family/caregiver?: Yes  Were Treatment Team discharge recommendations reviewed with patient/caregiver?: Yes  Did patient/caregiver verbalize understanding of patient care needs?: Yes  Were patient/caregiver advised of the risks associated with not following Treatment Team discharge recommendations?: Yes    Contacts  Patient Contacts: Lisa Penny (Care Giver)  Relationship to Patient:: Other (Comment) (Group Home Director)  Contact Method: Phone  Phone Number: [phone]  Reason/Outcome: Discharge Planning, Emergency Contact, Continuity of Care    Requested Home Health Care         Is the patient interested in HHC at discharge?: No    DME Referral Provided  Referral made for DME?: No    Other Referral/Resources/Interventions Provided:  Interventions: HHC, Other (Specify) (Tube feeds)         Treatment Team Recommendation: Group Home  Discharge Destination Plan:: Group Home  Transport at Discharge : Other (Comment) (Group Home)                                       Upon review of chart patient has ID so CM reached out to Group Home staff Lisa.    CM name and role reviewed.  CM reviewed assessment that was just completed on 8/21/24.  No changes to the assessment at this time.    CM discussed if they would like HHC set up for nurse teaching on the tube feed set up at DC.  Lisa stated that they already had the tube feeds supplies delivered to the home and she would be able to set up the pump no need for HHC to be set up.    Lisa also confirmed that the group home will be providing transportation home when cleared for DC.    CM discussed with [facility] provider to ensure  that there are no changes being made to the tube feed orders since the supplies were already delivered prior to her admission.      CM reviewed discharge planning process including the following: identifying caregivers at home, preference for d/c planning needs, [facility] program, availability of treatment team to discuss questions or concerns patient and/or family may have regarding diagnosis, plan of care, old or new medications and discharge planning.  CM will continue to follow for care coordination and update assessment as necessary.

## 2024-08-27 NOTE — UTILIZATION REVIEW
Initial Clinical Review    Admission: Date/Time/Statement:   Admission Orders (From admission, onward)       Ordered        08/24/24 1613  INPATIENT ADMISSION  Once                          Orders Placed This Encounter   Procedures    INPATIENT ADMISSION     Standing Status:   Standing     Number of Occurrences:   1     Order Specific Question:   Level of Care     Answer:   Med Surg [16]     Order Specific Question:   Estimated length of stay     Answer:   More than 2 Midnights     Order Specific Question:   Certification     Answer:   I certify that inpatient services are medically necessary for this patient for a duration of greater than two midnights. See H&P and MD Progress Notes for additional information about the patient's course of treatment.     ED Arrival Information       Expected   -    Arrival   8/24/2024 08:56    Acuity   Urgent              Means of arrival   Ambulance    Escorted by   [facility]    Service   Hospitalist    Admission type   Emergency              Arrival complaint   ems             Chief Complaint   Patient presents with    Medical Problem     [facility]. Was discharged from hospital yesterday for PNA and UTI. Non-compliance with medications, per staff. Concerned that temp was 101F this AM and SpO2 was 86% on room air. After having a bath, temp reduced and oxygen saturation stabilized. Patient has cognitive impairment at baseline, caregiver at bedside.       Initial Presentation: 64 y.o. female  to ED via EMS from [facility].    Admitted to inpatient with Dx: UTI/Dysphagia/DM/Mood disturbances/parkinson/Cerebral palsy.  Presented to ED with refusal to take medications on day of arrival, febrile to 102 and transient hypoxia.  Making gurgling sounds. Patient was just discharged from hospital yesterday after being treated for UTI/pneumonia. PMHx: mental retardation, functional quadriplegia, dysphagia and cerebral palsy. Previously GI tried to  place feeding tube but was unsuccessful due to stomach being higher up and close to rib cage.   On exam: appears ill.   Herpetic lesions scattered across right lateral aspect of mouth.   Abdominal tenderness.  Non verbal. Pt will attempt to ingest very little until crying and spitting remainder out.    Procalcitonin 1.66.  glucose 427.wbc 5.85.  H&H 10/30.5.  Imaging shows cystitis.  Distention of colon; Decubitus pressure change in the subcutaneous tissues along the bilateral buttocks; Increasing dependent density in the lungs probably representing increasing atelectasis and possible superimposed aspiration; Layering secretions in the trache.    ED treatment:  Given Toradol, started on antibiotics.    Plan includes  to continue antibiotics.  Consult surgery for feeding tube.  Consult speech.  Start SSI.  Continue IVF.     Anticipated Length of Stay/Certification Statement: Patient will be admitted on an inpatient basis with an anticipated length of stay of greater than 2 midnights secondary to surgery.     Date: 8/25/24    Day 2: alert. Mental status at baseline, is not able to communicate or move around.   Wbc 5.62.  H&H 9.6/29.4.  K 3.4.   glucose 172>169.  Early am glucose to 83.  Insulin adjusted, given D50.   Continue ceftriaxone and IVF.   Seen by speech - recommend NPO with tube feeds.      Per surgery 8/25/24:  admitted for aspiration pneumonia and general surgery was consulted to perform an open gastrostomy while she is admitted to the hospital. pmhx of cerebral palsy and Parkinson's with a history of failure to thrive.   PEG tube placement was attempted on 8/13 but given her high riding stomach a safe window could not be established. She was referred to general surgery as an outpatient for evaluation for open gastrostomy tube placement.  Plan is open gastrostomy tube placement on 8/26.  IVF.       ED Triage Vitals   Temperature Pulse Respirations Blood Pressure SpO2 Pain Score   08/24/24 0903 08/24/24  0903 08/24/24 0903 08/24/24 0903 08/24/24 0903 08/26/24 1715   98.2 °F (36.8 °C) 70 20 112/65 93 % 10 - Worst Possible Pain     Weight (last 2 days)       None            Vital Signs (last 3 days)       Date/Time Temp Pulse Resp BP MAP (mmHg) SpO2 Calculated FIO2 (%) - Nasal Cannula Nasal Cannula O2 Flow Rate (L/min) O2 Device Cardiac (WDL) Patient Position - Orthostatic VS Pain    08/27/24 08:07:43 97.1 °F (36.2 °C) 56 18 108/71 83 96 % -- -- -- -- -- --    08/26/24 2354 -- -- -- -- -- -- -- -- -- -- -- 8    08/26/24 21:02:27 98.8 °F (37.1 °C) 70 16 113/75 88 99 % -- -- -- -- -- --    08/26/24 20:01:13 98.9 °F (37.2 °C) 79 12 114/76 89 94 % -- -- -- -- -- --    08/26/24 1913 -- -- -- -- -- -- -- -- -- -- -- No Pain    08/26/24 18:38:56 99.4 °F (37.4 °C) 84 16 121/88 99 97 % -- -- -- -- -- --    08/26/24 1800 97.5 °F (36.4 °C) 86 20 123/72 91 95 % 32 3 L/min Nasal cannula X -- --    08/26/24 1745 97.5 °F (36.4 °C) 78 21 132/79 97 99 % 32 3 L/min Nasal cannula X -- --    08/26/24 1730 -- 88 17 132/79 97 99 % 32 3 L/min Nasal cannula -- -- --    08/26/24 1729 -- 88 18 161/85 113 99 % 32 3 L/min Nasal cannula -- -- 10 - Worst Possible Pain    08/26/24 1715 97.5 °F (36.4 °C) 78 17 181/75 -- 98 % 40 5 L/min Nasal cannula X -- 10 - Worst Possible Pain    08/26/24 1446 97.2 °F (36.2 °C) 111 18 179/85 -- 95 % -- -- None (Room air) -- -- --    08/26/24 10:09:19 -- 104 -- 134/72 93 98 % -- -- -- -- -- --    08/26/24 0712 -- -- -- -- -- -- 28 2 L/min Nasal cannula -- -- --    08/26/24 0700 100 °F (37.8 °C) 85 18 111/68 82 92 % -- -- -- -- Lying --    08/26/24 06:58:44 100 °F (37.8 °C) 84 18 111/68 82 93 % -- -- -- -- -- --    08/25/24 23:14:26 99.9 °F (37.7 °C) 95 16 138/86 103 95 % -- -- -- -- -- --    08/25/24 2056 -- -- -- -- -- -- 28 2 L/min Nasal cannula -- -- --    08/25/24 16:24:01 -- 74 16 133/71 92 91 % -- -- None (Room air) -- Lying --    08/25/24 1359 -- -- -- -- -- -- -- -- None (Room air) -- -- --    08/25/24  07:09:30 98.5 °F (36.9 °C) 71 15 110/58 75 93 % -- -- -- -- -- --    08/24/24 22:34:31 99.9 °F (37.7 °C) 74 16 122/76 91 93 % -- -- -- -- -- --    08/24/24 2000 -- -- -- -- -- -- -- -- None (Room air) -- -- --    08/24/24 19:24:25 99.7 °F (37.6 °C) 75 16 133/74 94 89 % -- -- -- -- -- --    08/24/24 1900 -- 79 -- 134/70 98 91 % -- -- -- -- -- --    08/24/24 1830 -- 82 -- 157/78 108 92 % -- -- -- -- -- --    08/24/24 1608 -- 67 -- 122/64 86 92 % -- -- None (Room air) -- Lying --    08/24/24 1343 99 °F (37.2 °C) -- -- -- -- -- -- -- -- -- -- --    08/24/24 1308 -- 71 20 130/63 -- 94 % -- -- None (Room air) -- Lying --    08/24/24 1100 -- 65 -- 124/68 90 95 % -- -- -- -- -- --    08/24/24 0903 98.2 °F (36.8 °C) 70 20 112/65 -- 93 % -- -- None (Room air) -- Lying --            Pertinent Labs/Diagnostic Test Results:   Radiology:  CT soft tissue neck with contrast   Final Interpretation by Prudencio Rowan MD (08/24 1531)      No acute finding in the neck by CT. See additional findings above.            Workstation performed: [id number]         CT chest abdomen pelvis w contrast   Final Interpretation by Prudencio Rowan MD (08/24 1524)      Cystitis with wall thickening the urinary bladder and mucosal enhancement. No findings of upper urinary tract infection.      Distention of the colon probably representing chronic colonic pseudoobstruction or adynamic colonic ileus.      Decubitus pressure change in the subcutaneous tissues along the bilateral buttocks.      Increasing dependent density in the lungs probably representing increasing atelectasis and possible superimposed aspiration. Early aspiration pneumonia is not excluded.      Layering secretions in the trachea      Focus of hyperenhancement along the anterior lower uterine segment. Differential diagnosis would include a cervical mass or fibroid. Correlation with pelvic ultrasound or MRI may be considered.      The study was marked in EPIC for immediate  notification.      Workstation performed: [id number]         XR chest 1 view portable   ED Interpretation by Jamin Castro DO (08/24 1002)   No infiltrate now apparent      Final Interpretation by Ezra Kaur MD (08/24 1216)      Clear lungs with interval resolution of the right basilar airspace opacity.            Workstation performed: [id number]           Results from last 7 days   Lab Units 08/20/24  1239   SARS-COV-2  Negative     Results from last 7 days   Lab Units 08/27/24  0538 08/26/24  0451 08/25/24  0344 08/24/24  0951 08/23/24  0522 08/22/24  0808 08/21/24  0442   WBC Thousand/uL 7.95 8.35 5.62 5.85 4.03*   < > 6.21   HEMOGLOBIN g/dL 9.4* 10.0* 9.6* 10.0* 10.0*   < > 9.8*   HEMATOCRIT % 29.5* 31.1* 29.4* 30.5* 30.4*   < > 31.5*   PLATELETS Thousands/uL 206 239 178 153 156   < > 106*   TOTAL NEUT ABS Thousands/µL 5.77  --   --  3.98  --   --  3.95    < > = values in this interval not displayed.     Results from last 7 days   Lab Units 08/27/24  0538 08/26/24  0451 08/25/24  0344 08/24/24  0951 08/23/24  0821   SODIUM mmol/L 136 143 144 140 140   POTASSIUM mmol/L 5.0 4.3 3.4* 4.7 4.2   CHLORIDE mmol/L 104 103 106 102 105   CO2 mmol/L 18* 29 31 34* 26   ANION GAP mmol/L 14* 11 7 4 9   BUN mg/dL 16 12 16 16 18   CREATININE mg/dL 0.82 0.86 0.94 0.96 0.93   EGFR ml/min/1.73sq m 76 72 65 63 66   CALCIUM mg/dL 8.2* 9.5 9.4 9.5 9.6     Results from last 7 days   Lab Units 08/24/24  0951 08/21/24  0442 08/20/24  1727 08/20/24  1239   AST U/L 17 45*  --  57*   ALT U/L 49 53*  --  33   ALK PHOS U/L 88 64  --  85   TOTAL PROTEIN g/dL 6.4 6.3*  --  7.4   ALBUMIN g/dL 3.4* 3.4*  --  3.9   TOTAL BILIRUBIN mg/dL 0.28 0.19*  --  0.22   BILIRUBIN DIRECT mg/dL  --  0.00  --   --    AMMONIA umol/L  --   --  31  --      Results from last 7 days   Lab Units 08/27/24  0724 08/26/24  1840 08/26/24  1042 08/26/24  0657 08/25/24  2053 08/25/24  1115 08/25/24  0748 08/25/24  0707 08/24/24  2107 08/23/24  1318 08/23/24  1218  08/23/24  1142   POC GLUCOSE mg/dl 243* 284* 165* 233* 167* 169* 172* 52* 264* 182* 229* 296*     Results from last 7 days   Lab Units 08/27/24  0538 08/26/24  0451 08/25/24  0344 08/24/24  0951 08/23/24  0821 08/22/24  0808 08/21/24  0442 08/20/24  1239   GLUCOSE RANDOM mg/dL 232* 179* 83 427* 430* 214* 116 168*     Results from last 7 days   Lab Units 08/20/24  1727   PH LISSA  7.405*   PCO2 LISSA mm Hg 48.4   PO2 LISSA mm Hg 49.8*   HCO3 LISSA mmol/L 29.6   BASE EXC LISSA mmol/L 4.2   O2 CONTENT LISSA ml/dL 12.5   O2 HGB, VENOUS % 83.3*     Results from last 7 days   Lab Units 08/20/24  1349   PROTIME seconds 13.3   INR  0.94   PTT seconds 25     Results from last 7 days   Lab Units 08/27/24  0538 08/26/24  0451 08/25/24  0344 08/24/24  1740 08/23/24  0522   PROCALCITONIN ng/ml 0.58* 0.62* 1.44* 1.66* 3.98*     Results from last 7 days   Lab Units 08/21/24  0442 08/20/24  1727 08/20/24  1425 08/20/24  1239   LACTIC ACID mmol/L 1.1 2.3* 2.8* 3.9*     Results from last 7 days   Lab Units 08/20/24  1303   CLARITY UA  Extra Turbid   COLOR UA  Yellow   SPEC GRAV UA  1.016   PH UA  6.5   GLUCOSE UA mg/dl Negative   KETONES UA mg/dl 10 (1+)*   BLOOD UA  Small*   PROTEIN UA mg/dl 30 (1+)*   NITRITE UA  Negative   BILIRUBIN UA  Negative   UROBILINOGEN UA (BE) mg/dl <2.0   LEUKOCYTES UA  Large*   WBC UA /hpf Innumerable*   RBC UA /hpf 30-50*   BACTERIA UA /hpf Moderate*   EPITHELIAL CELLS WET PREP /hpf None Seen     Results from last 7 days   Lab Units 08/20/24  1239   INFLUENZA A PCR  Negative   INFLUENZA B PCR  Negative   RSV PCR  Negative     Results from last 7 days   Lab Units 08/20/24  1303 08/20/24  1239   BLOOD CULTURE   --  No Growth After 5 Days.  No Growth After 5 Days.   URINE CULTURE  >100,000 cfu/ml Escherichia coli*  --      ED Treatment-Medication Administration from 08/24/2024 0856 to 08/24/2024 1915         Date/Time Order Dose Route Action     08/24/2024 1319 cefpodoxime (VANTIN) tablet 200 mg 200 mg Oral Given      08/24/2024 1304 ketorolac (TORADOL) injection 15 mg 15 mg Intravenous Given     08/24/2024 1456 iohexol (OMNIPAQUE) 350 MG/ML injection (MULTI-DOSE) 90 mL 90 mL Intravenous Given     08/24/2024 1739 ceftriaxone (ROCEPHIN) 1 g/50 mL in dextrose IVPB 1,000 mg Intravenous New Bag            Past Medical History:   Diagnosis Date    Acute metabolic encephalopathy 12/11/2015    Adjustment disorder     Altered mental status 12/11/2015    Anemia     Bipolar 1 disorder ([facility])     Cerebral palsy ([facility])     Chronic hypernatremia 02/06/2016    Closed fracture of left hip ([facility]) 01/19/2016    Closed left hip fracture ([facility])     no surgery    Constipation     Dehydration 02/20/2016    Developmental delay     Diabetes mellitus ([facility])     Difficulty walking     Disease of thyroid gland     Diverticulosis     Dysphagia     Worsening    Fracture of multiple ribs of right side     Herpes zoster without complication 06/02/2021    Hip fracture ([facility]) 07/26/2015    left    Hyperlipidemia     Hypernatremia 12/28/2018    Hypotension     Impulse control disorder     Incontinence     Intellectual disability due to developmental disorder, unspecified     Microalbuminuria     Neuropathy in diabetes ([facility])     Osteopathia     Osteoporosis     Peripheral neuropathy     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 female  Admission Orders:  Scheduled Medications:  ARIPiprazole, 2 mg, Oral, HS  vitamin C, 1,000 mg, Oral, Daily  baclofen, 10 mg, Oral, TID  calcium carbonate, 750 mg, Oral, BID  carbidopa-levodopa, 2 tablet, Oral,  BID  [START ON 8/26/2024] cefazolin, 2,000 mg, Intravenous, On Call To OR  cefTRIAXone, 1,000 mg, Intravenous, Q24H  cholecalciferol, 1,000 Units, Oral, Daily  citalopram, 40 mg, Oral, Daily  docosanol, , Topical, 5x Daily  enoxaparin, 40 mg, Subcutaneous, Daily  Ferrous Sulfate, 300 mg, Oral, BID AC  insulin lispro, 1-5 Units, Subcutaneous, 4 times day  insulin lispro, 1-5 Units, Subcutaneous, HS  levothyroxine, 25 mcg, Per G Tube, QAM  LORazepam, 0.5 mg, Oral, Daily  methenamine hippurate, 1 g, Oral, BID  [START ON 8/26/2024] metroNIDAZOLE, 500 mg, Intravenous, On Call To OR  omeprazole (PRILOSEC) suspension 2 mg/mL, 20 mg, Oral, Daily  oxybutynin, 5 mg, Oral, TID  polyethylene glycol, 17 g, Oral, Daily  pravastatin, 40 mg, Oral, Daily With Dinner  traZODone, 100 mg, Oral, HS  valACYclovir, 1,000 mg, Oral, Q12H ROSEMARY    dextrose 50 % IV solution 25 mL  Dose: 25 mL  Freq: Once Route: IV  Start: 08/25/24 0730 End: 08/25/24 0721     insulin glargine (LANTUS) subcutaneous injection 5 Units 0.05 mL  Dose: 5 Units  Freq: Daily at bedtime Route: SC  Start: 08/24/24 2200 End: 08/25/24 1040     insulin lispro (HumALOG/ADMELOG) 100 units/mL subcutaneous injection 10 Units  Dose: 10 Units  Freq: Once Route: SC  Indications of Use: HYPERGLYCEMIA  Start: 08/24/24 2000 End: 08/25/24 0031   potassium chloride 20 mEq IVPB (premix)  Dose: 20 mEq  Freq: Every 2 hours Route: IV  Last Dose: 20 mEq (08/25/24 1025)  Start: 08/25/24 0645 End: 08/25/24 1225    Continuous IV Infusions:  lactated ringers, 75 mL/hr, Intravenous, Continuous      PRN Meds: not used.   acetaminophen, 650 mg, Oral, Q4H PRN  ammonium lactate, , Topical, BID PRN  artificial tear, , Both Eyes, HS PRN  carbamide peroxide, 2 drop, Both Ears, BID PRN  dextromethorphan-guaiFENesin, 10 mL, Per G Tube, Q4H PRN  fluticasone, 1 spray, Nasal, Daily PRN  hydrocortisone, , Topical, 4x Daily PRN  Ketotifen Fumarate, 1 drop, Both Eyes, Daily PRN  Loratadine, 10 mg, Oral, Daily  PRN  oxyCODONE, 2.5 mg, Per G Tube, Q4H PRN  oxyCODONE, 5 mg, Per G Tube, Q4H PRN    NPO; sips with medications     IP CONSULT TO ACUTE CARE SURGERY  IP CONSULT TO NUTRITION SERVICES    Network Utilization Review Department  ATTENTION: Please call with any questions or concerns to [phone] and carefully listen to the prompts so that you are directed to the right person. All voicemails are confidential.   For Discharge needs, contact Care Management DC Support Team at [phone] opt. [phone]  Send all requests for admission clinical reviews, approved or denied determinations and any other requests to dedicated fax number below belonging to the campus where the patient is receiving treatment. List of dedicated fax numbers for the Facilities:  FACILITY NAME UR FAX NUMBER   ADMISSION DENIALS (Administrative/Medical Necessity) [phone]   DISCHARGE SUPPORT TEAM (NETWORK) [phone]   PARENT CHILD HEALTH (Maternity/NICU/Pediatrics) [phone]   [facility] [phone]   [facility] [phone]   [facility] [phone]   [facility] [phone]   [facility] [phone]   [facility] [phone]   [facility] [phone]   [facility] [phone]   [facility] [phone]   [facility] [phone]   [facility] [phone]   [facility] [phone]

## 2024-08-27 NOTE — PROGRESS NOTES
Progress Note - General Surgery  Terrie Alicea 64 y.o. female MRN: [id number]  Unit/Bed#: S -01 Encounter: [id number]    Assessment:  64 y.o. female with cerebral palsy, Parkinson's, multiple abdominal surgeries, p/w failure to thrive, c/f dysphagia s/p 6/11 unsuccessful PEG, is now 1 Day Post-Op s/p Procedure(s) (LRB):  INSERTION GASTROSTOMY TUBE OPEN (N/A).  G-tube left to gravity overnight.  G-tube measures 4 cm at the bumper    Plan:  - Diet NPO; Sips with meds  - Pain and Nausea control PRN  - Can start trickle feeds today  - If tolerating trickle rate, may progressively advance to goal feeds  - Additional care per primary team  - Please message the General surgery resident role with any concerns    Subjective/Objective     Subjective: No acute overnight events.      Objective:   Vitals: Blood pressure 113/75, pulse 70, temperature 98.8 °F (37.1 °C), resp. rate 16, weight 41.8 kg (92 lb 2.4 oz), last menstrual period 11/29/2009, SpO2 99%, not currently breastfeeding.,Body mass index is 19.26 kg/m².    I/O         08/25 0701  08/26 0700 08/26 0701  08/27 0700    P.O.  0    I.V. (mL/kg) 1000 (23.9) 600 (14.4)    Total Intake(mL/kg) 1000 (23.9) 600 (14.4)    Urine (mL/kg/hr) 943 (0.9)     Total Output 943     Net +57 +600          Unmeasured Urine Occurrence 3 x 1 x    Unmeasured Stool Occurrence  1 x            Physical Exam:  Gen: NAD, Comfortable in bed  Chest: Normal work of breathing, no respiratory distress  Abd: Soft, ND, appropriately tender.  G-tube in place with no drainage or erythema.  G-tube is 4cm at the skin  Ext: No Edema  Skin: Warm, Dry, Intact      Lab, Imaging and other studies: I have personally reviewed pertinent reports.  , CBC with diff:   Lab Results   Component Value Date    WBC 7.95 08/27/2024    HGB 9.4 (L) 08/27/2024    HCT 29.5 (L) 08/27/2024     (H) 08/27/2024     08/27/2024    RBC 2.86 (L) 08/27/2024    MCH 32.9 08/27/2024    MCHC 31.9 08/27/2024    RDW 12.9  "08/27/2024    MPV 11.1 08/27/2024    NRBC 0 08/27/2024   , BMP/CMP: No results found for: \"SODIUM\", \"K\", \"CL\", \"CO2\", \"ANIONGAP\", \"BUN\", \"CREATININE\", \"GLUCOSE\", \"CALCIUM\", \"AST\", \"ALT\", \"ALKPHOS\", \"PROT\", \"BILITOT\", \"EGFR\", Magnesium: No components found for: \"MAG\"  VTE Pharmacologic Prophylaxis: Enoxaparin (Lovenox)  VTE Mechanical Prophylaxis: sequential compression device        Amalia Delvalle MD  8/27/2024  6:03 AM      "

## 2024-08-28 LAB
ALBUMIN SERPL BCG-MCNC: 2.9 G/DL (ref 3.5–5)
ALP SERPL-CCNC: 56 U/L (ref 34–104)
ALT SERPL W P-5'-P-CCNC: 5 U/L (ref 7–52)
ANION GAP SERPL CALCULATED.3IONS-SCNC: 6 MMOL/L (ref 4–13)
AST SERPL W P-5'-P-CCNC: 4 U/L (ref 13–39)
BASOPHILS # BLD AUTO: 0.02 THOUSANDS/ΜL (ref 0–0.1)
BASOPHILS NFR BLD AUTO: 0 % (ref 0–1)
BILIRUB SERPL-MCNC: 0.15 MG/DL (ref 0.2–1)
BUN SERPL-MCNC: 12 MG/DL (ref 5–25)
CALCIUM ALBUM COR SERPL-MCNC: 10.2 MG/DL (ref 8.3–10.1)
CALCIUM SERPL-MCNC: 9.3 MG/DL (ref 8.4–10.2)
CHLORIDE SERPL-SCNC: 104 MMOL/L (ref 96–108)
CO2 SERPL-SCNC: 30 MMOL/L (ref 21–32)
CREAT SERPL-MCNC: 0.88 MG/DL (ref 0.6–1.3)
EOSINOPHIL # BLD AUTO: 0.13 THOUSAND/ΜL (ref 0–0.61)
EOSINOPHIL NFR BLD AUTO: 2 % (ref 0–6)
ERYTHROCYTE [DISTWIDTH] IN BLOOD BY AUTOMATED COUNT: 13.2 % (ref 11.6–15.1)
GFR SERPL CREATININE-BSD FRML MDRD: 70 ML/MIN/1.73SQ M
GLUCOSE SERPL-MCNC: 254 MG/DL (ref 65–140)
GLUCOSE SERPL-MCNC: 261 MG/DL (ref 65–140)
GLUCOSE SERPL-MCNC: 268 MG/DL (ref 65–140)
GLUCOSE SERPL-MCNC: 362 MG/DL (ref 65–140)
GLUCOSE SERPL-MCNC: 380 MG/DL (ref 65–140)
GLUCOSE SERPL-MCNC: 387 MG/DL (ref 65–140)
HCT VFR BLD AUTO: 29.4 % (ref 34.8–46.1)
HGB BLD-MCNC: 9.7 G/DL (ref 11.5–15.4)
IMM GRANULOCYTES # BLD AUTO: 0.04 THOUSAND/UL (ref 0–0.2)
IMM GRANULOCYTES NFR BLD AUTO: 1 % (ref 0–2)
LYMPHOCYTES # BLD AUTO: 0.74 THOUSANDS/ΜL (ref 0.6–4.47)
LYMPHOCYTES NFR BLD AUTO: 12 % (ref 14–44)
MAGNESIUM SERPL-MCNC: 1.7 MG/DL (ref 1.9–2.7)
MCH RBC QN AUTO: 32.6 PG (ref 26.8–34.3)
MCHC RBC AUTO-ENTMCNC: 33 G/DL (ref 31.4–37.4)
MCV RBC AUTO: 99 FL (ref 82–98)
MONOCYTES # BLD AUTO: 0.52 THOUSAND/ΜL (ref 0.17–1.22)
MONOCYTES NFR BLD AUTO: 9 % (ref 4–12)
NEUTROPHILS # BLD AUTO: 4.69 THOUSANDS/ΜL (ref 1.85–7.62)
NEUTS SEG NFR BLD AUTO: 76 % (ref 43–75)
NRBC BLD AUTO-RTO: 0 /100 WBCS
PHOSPHATE SERPL-MCNC: 4.4 MG/DL (ref 2.3–4.1)
PLATELET # BLD AUTO: 245 THOUSANDS/UL (ref 149–390)
PMV BLD AUTO: 10.7 FL (ref 8.9–12.7)
POTASSIUM SERPL-SCNC: 3.9 MMOL/L (ref 3.5–5.3)
PROT SERPL-MCNC: 5.6 G/DL (ref 6.4–8.4)
RBC # BLD AUTO: 2.98 MILLION/UL (ref 3.81–5.12)
SODIUM SERPL-SCNC: 140 MMOL/L (ref 135–147)
WBC # BLD AUTO: 6.14 THOUSAND/UL (ref 4.31–10.16)

## 2024-08-28 PROCEDURE — 84100 ASSAY OF PHOSPHORUS: CPT | Performed by: INTERNAL MEDICINE

## 2024-08-28 PROCEDURE — 99232 SBSQ HOSP IP/OBS MODERATE 35: CPT | Performed by: HOSPITALIST

## 2024-08-28 PROCEDURE — 80053 COMPREHEN METABOLIC PANEL: CPT

## 2024-08-28 PROCEDURE — 85025 COMPLETE CBC W/AUTO DIFF WBC: CPT

## 2024-08-28 PROCEDURE — 82948 REAGENT STRIP/BLOOD GLUCOSE: CPT

## 2024-08-28 PROCEDURE — 83735 ASSAY OF MAGNESIUM: CPT | Performed by: INTERNAL MEDICINE

## 2024-08-28 RX ORDER — INSULIN LISPRO 100 [IU]/ML
1-6 INJECTION, SOLUTION INTRAVENOUS; SUBCUTANEOUS EVERY 6 HOURS
Status: DISCONTINUED | OUTPATIENT
Start: 2024-08-28 | End: 2024-08-29

## 2024-08-28 RX ORDER — MAGNESIUM SULFATE HEPTAHYDRATE 40 MG/ML
2 INJECTION, SOLUTION INTRAVENOUS ONCE
Status: COMPLETED | OUTPATIENT
Start: 2024-08-28 | End: 2024-08-28

## 2024-08-28 RX ADMIN — LEVOTHYROXINE SODIUM 25 MCG: 25 TABLET ORAL at 08:48

## 2024-08-28 RX ADMIN — OXYBUTYNIN CHLORIDE 5 MG: 5 TABLET ORAL at 20:51

## 2024-08-28 RX ADMIN — VALACYCLOVIR 1000 MG: 500 TABLET, FILM COATED ORAL at 08:47

## 2024-08-28 RX ADMIN — DOCOSANOL: 100 CREAM TOPICAL at 15:07

## 2024-08-28 RX ADMIN — OXYBUTYNIN CHLORIDE 5 MG: 5 TABLET ORAL at 16:18

## 2024-08-28 RX ADMIN — VALACYCLOVIR 1000 MG: 500 TABLET, FILM COATED ORAL at 20:51

## 2024-08-28 RX ADMIN — INSULIN LISPRO 5 UNITS: 100 INJECTION, SOLUTION INTRAVENOUS; SUBCUTANEOUS at 15:07

## 2024-08-28 RX ADMIN — DOCOSANOL: 100 CREAM TOPICAL at 20:09

## 2024-08-28 RX ADMIN — CARBIDOPA AND LEVODOPA 2 TABLET: 25; 100 TABLET, EXTENDED RELEASE ORAL at 08:47

## 2024-08-28 RX ADMIN — BACLOFEN 10 MG: 10 TABLET ORAL at 20:51

## 2024-08-28 RX ADMIN — TRAZODONE HYDROCHLORIDE 100 MG: 100 TABLET ORAL at 20:51

## 2024-08-28 RX ADMIN — METHENAMINE HIPPURATE 1 G: 1 TABLET ORAL at 08:48

## 2024-08-28 RX ADMIN — CALCIUM CARBONATE (ANTACID) CHEW TAB 500 MG 750 MG: 500 CHEW TAB at 20:51

## 2024-08-28 RX ADMIN — ARIPIPRAZOLE 2 MG: 2 TABLET ORAL at 21:09

## 2024-08-28 RX ADMIN — CARBIDOPA AND LEVODOPA 2 TABLET: 25; 100 TABLET, EXTENDED RELEASE ORAL at 20:51

## 2024-08-28 RX ADMIN — OXYCODONE HYDROCHLORIDE AND ACETAMINOPHEN 1000 MG: 500 TABLET ORAL at 08:48

## 2024-08-28 RX ADMIN — Medication 1000 UNITS: at 08:48

## 2024-08-28 RX ADMIN — LORAZEPAM 0.5 MG: 0.5 TABLET ORAL at 20:51

## 2024-08-28 RX ADMIN — METHENAMINE HIPPURATE 1 G: 1 TABLET ORAL at 20:10

## 2024-08-28 RX ADMIN — BACLOFEN 10 MG: 10 TABLET ORAL at 08:48

## 2024-08-28 RX ADMIN — INSULIN LISPRO 6 UNITS: 100 INJECTION, SOLUTION INTRAVENOUS; SUBCUTANEOUS at 08:50

## 2024-08-28 RX ADMIN — DOCOSANOL: 100 CREAM TOPICAL at 12:48

## 2024-08-28 RX ADMIN — INSULIN LISPRO 3 UNITS: 100 INJECTION, SOLUTION INTRAVENOUS; SUBCUTANEOUS at 20:10

## 2024-08-28 RX ADMIN — CITALOPRAM HYDROBROMIDE 40 MG: 20 TABLET ORAL at 08:48

## 2024-08-28 RX ADMIN — PRAVASTATIN SODIUM 40 MG: 40 TABLET ORAL at 16:18

## 2024-08-28 RX ADMIN — OXYBUTYNIN CHLORIDE 5 MG: 5 TABLET ORAL at 08:47

## 2024-08-28 RX ADMIN — DOCOSANOL: 100 CREAM TOPICAL at 09:03

## 2024-08-28 RX ADMIN — MAGNESIUM SULFATE HEPTAHYDRATE 2 G: 40 INJECTION, SOLUTION INTRAVENOUS at 16:18

## 2024-08-28 RX ADMIN — BACLOFEN 10 MG: 10 TABLET ORAL at 16:18

## 2024-08-28 RX ADMIN — INSULIN LISPRO 2 UNITS: 100 INJECTION, SOLUTION INTRAVENOUS; SUBCUTANEOUS at 01:31

## 2024-08-28 RX ADMIN — ENOXAPARIN SODIUM 40 MG: 40 INJECTION SUBCUTANEOUS at 08:47

## 2024-08-28 RX ADMIN — FERROUS SULFATE 300 MG: 300 SOLUTION ORAL at 16:18

## 2024-08-28 RX ADMIN — DOCOSANOL: 100 CREAM TOPICAL at 22:06

## 2024-08-28 NOTE — ASSESSMENT & PLAN NOTE
History of mood disturbances, previously on neuroleptics  Tardive dyskinesia on exam    Sinemet  BID. Can defer changes to outpatient neurology.

## 2024-08-28 NOTE — CASE MANAGEMENT
Case Management Progress Note    Patient name Terrie Alicea  Location S /S -01 MRN [id number]  : 1960 Date 2024       LOS (days): 4  Geometric Mean LOS (GMLOS) (days):   Days to GMLOS:        OBJECTIVE:        Current admission status: Inpatient  Preferred Pharmacy:   [facility] - [address]  [address]  [address]  [place] [address]  Phone: [phone] Fax: [phone]    [facility]  [address]  [place] [address]  Phone: [phone] Fax: [phone]    [facility] - [place], [place] - [address]  [address] [address]  Phone: [phone] Fax: [phone]    Primary Care Provider: Gunnar Sylvester DO    Primary Insurance: [employer]  REP  Secondary Insurance: [place] [employer]    PROGRESS NOTE:    Weekly Care Management Length of Stay Review     Current LOS: 4 Days    Most Recent Labs:     Lab Results   Component Value Date/Time    WBC 6.14 2024 11:32 AM    HGB 9.7 (L) 2024 11:32 AM    HCT 29.4 (L) 2024 11:32 AM     2024 11:32 AM    SODIUM 136 2024 05:38 AM    K 5.0 2024 05:38 AM     2024 05:38 AM    CO2 18 (L) 2024 05:38 AM    BUN 16 2024 05:38 AM    CREATININE 0.82 2024 05:38 AM    GLUC 232 (H) 2024 05:38 AM       Most Recent Vitals:   Vitals:    24 0718   BP: 121/68   Pulse: 85   Resp: 12   Temp: 98.5 °F (36.9 °C)   SpO2: 94%        Identified Barriers to Discharge/Discharge Goals/Care Management Interventions: UTi, PEG placed-pending tube feed goals.     Intended Discharge Disposition: Returning to group home    Expected Discharge Date:    48hrs

## 2024-08-28 NOTE — NUTRITION
Open G tube scheduled. Recs placed 8/8/24 per OP RD as below.       Patient resides in a group home. Tube feedings will need to be run overnight from 6pm-6am as they do not have staff available during the daytime who can administer bolus feeds.      The group home has a dietitian consultant who completes quarterly reviews on all residents and can help to monitor an established enteral nutrition pt. Facility prefers to provide water flushes with medications; pt receives meds at 6am, 4pm, 8pm.     Recommendations:  Feeds to be administered via pump from 6pm-6am.   Recommend Jevity 1.5 formula, start at 20mL/hr, advance rate by 10mL/hr every 4 hours to goal rate of 70mL/hr x12 hrs.   At goal, will provide 1260 kcals, 54g protein, 638mL water in TF formula  Recommend 60mL water flush before and after TF cycle, and an additional 500mL water daily that can be divided amongst med passes.                See above TF recommendations from 8/26  The current order is for 70 ml continuous and patient will be receiving double her recs. Blood sugars are 380.     Spoke to Dr Mike at length.    8/28 Suggest restart at 6pm at 40 ml/hr advancing by 10 ml q 4 hours as tolerated to goal rate of 70 ml /hr. Flush 60 ml pre and post feeding.

## 2024-08-28 NOTE — PROGRESS NOTES
[facility]  Progress Note  Name: Terrie Alicea I  MRN: [id number]  Unit/Bed#: S -01 I Date of Admission: 8/24/2024   Date of Service: 8/28/2024 I Hospital Day: 4    Assessment & Plan   * Dysphagia  Assessment & Plan  CT neck no organic obstructions in the mediastinum. Pt is POD2 s/p open gastrostomy tube placement. Initiated trickle feeds yesterday. Per group home, pt attends day program which cannot manage tube feeds so requesting her to receive it from 6pm to 6am. Settings adjusted to take this into consideration per nutrition.     Plan:  Continue Jevity 1.5.   Per nutrition - goal rate is 70 cc/hr x 12h (6pm-6am)  Restart TF @ 40 mL at 8/26 6pm. Increase by 10 cc/hr q4h as tolerated until goal rate is achieved. 60 mL water flush before & after TF cycle, and additional 500 mL water daily that can be divided amongst med passes.    DM (diabetes mellitus) ([facility])  Assessment & Plan  Lab Results   Component Value Date    HGBA1C 6.2 (H) 08/13/2024       Recent Labs     08/28/24  0105 08/28/24  0128 08/28/24  0714 08/28/24  1202   POCGLU 254* 261* 387* 362*       Blood Sugar Average: Last 72 hrs:  (P) 224.3013594763178791  Plan  Sliding scale: increased insulin lispro to 1-6 units  Q6h    Mood disturbances  Assessment & Plan  Continue PTA   Abilify 2 mg  Citalopram 40 mg  Trazodone 100 mg      Parkinson's disease  Assessment & Plan  History of mood disturbances, previously on neuroleptics  Tardive dyskinesia on exam    Sinemet  BID. Can defer changes to outpatient neurology.     Incontinence  Assessment & Plan  Continue oxybutynin 5 mg TID    UTI (urinary tract infection)  Assessment & Plan  Previous culture 8/20-grew E. coli, pansensitive  CT abdomen pelvis demonstrates wall thickening of urinary bladder  Concurrent aspiration pneumonia  ABX course complete      Cerebral palsy (HCC)  Assessment & Plan  Continue baclofen 10 mg TID             VTE Pharmacologic Prophylaxis: VTE  Score: 7 High Risk (Score >/= 5) - Pharmacological DVT Prophylaxis Ordered: enoxaparin (Lovenox). Sequential Compression Devices Ordered.    Mobility:   Basic Mobility Inpatient Raw Score: 6  JH-HLM Goal: 2: Bed activities/Dependent transfer  JH-HLM Achieved: 2: Bed activities/Dependent transfer  JH-HLM Goal achieved. Continue to encourage appropriate mobility.    Patient Centered Rounds: I performed bedside rounds with nursing staff today.  Discussions with Specialists or Other Care Team Provider: Discussed with RD at length regarding changes to tube feed settings    Education and Discussions with Family / Patient: Updated  (caregiver, Lisa) via phone.    Current Length of Stay: 4 day(s)  Current Patient Status: Inpatient   Discharge Plan: Anticipate discharge in 24-48 hrs to group home as pt tolerates feeds.    Code Status: Level 1 - Full Code    Subjective:   Pt seen and evaluated at bedside for continuity of care. Per nursing report, no acute events overnight. Pt awake and watching television at the time of this encounter. Pt is POD2 s/p G-tube insertion.     Objective:     Vitals:   Temp (24hrs), Av.6 °F (37 °C), Min:98.3 °F (36.8 °C), Max:99.1 °F (37.3 °C)    Temp:  [98.3 °F (36.8 °C)-99.1 °F (37.3 °C)] 99.1 °F (37.3 °C)  HR:  [77-87] 81  Resp:  [12-18] 18  BP: ()/(56-68) 120/61  SpO2:  [92 %-99 %] 92 %  Body mass index is 19.26 kg/m².     Input and Output Summary (last 24 hours):     Intake/Output Summary (Last 24 hours) at 2024 1849  Last data filed at 2024 0901  Gross per 24 hour   Intake 220 ml   Output 947 ml   Net -727 ml       Physical Exam:   Physical Exam  Vitals reviewed.   Constitutional:       General: She is not in acute distress.     Appearance: She is underweight.   HENT:      Head: Atraumatic.   Eyes:      Conjunctiva/sclera: Conjunctivae normal.   Cardiovascular:      Rate and Rhythm: Normal rate and regular rhythm.      Pulses: Normal pulses.      Heart  sounds: Normal heart sounds.   Pulmonary:      Effort: Pulmonary effort is normal. No respiratory distress.      Breath sounds: Normal breath sounds.   Abdominal:      General: There is no distension.      Tenderness: There is no guarding.      Comments: G-tube in place, no erythema or warmth noted   Musculoskeletal:         General: Deformity (R arm, baseline) present.   Skin:     General: Skin is warm.      Capillary Refill: Capillary refill takes less than 2 seconds.   Neurological:      Mental Status: She is alert. Mental status is at baseline.          Additional Data:     Labs:  Results from last 7 days   Lab Units 08/28/24  1132   WBC Thousand/uL 6.14   HEMOGLOBIN g/dL 9.7*   HEMATOCRIT % 29.4*   PLATELETS Thousands/uL 245   SEGS PCT % 76*   LYMPHO PCT % 12*   MONO PCT % 9   EOS PCT % 2     Results from last 7 days   Lab Units 08/28/24  1132   SODIUM mmol/L 140   POTASSIUM mmol/L 3.9   CHLORIDE mmol/L 104   CO2 mmol/L 30   BUN mg/dL 12   CREATININE mg/dL 0.88   ANION GAP mmol/L 6   CALCIUM mg/dL 9.3   ALBUMIN g/dL 2.9*   TOTAL BILIRUBIN mg/dL 0.15*   ALK PHOS U/L 56   ALT U/L 5*   AST U/L 4*   GLUCOSE RANDOM mg/dL 380*         Results from last 7 days   Lab Units 08/28/24  1202 08/28/24  0714 08/28/24  0128 08/28/24  0105 08/27/24  2107 08/27/24  1627 08/27/24  1146 08/27/24  0724 08/26/24  1840 08/26/24  1042 08/26/24  0657 08/25/24  2053   POC GLUCOSE mg/dl 362* 387* 261* 254* 223* 203* 186* 243* 284* 165* 233* 167*         Results from last 7 days   Lab Units 08/27/24  1151 08/27/24  0538 08/26/24  0451 08/25/24  0344 08/24/24  1740 08/23/24  0522   LACTIC ACID mmol/L 1.5  --   --   --   --   --    PROCALCITONIN ng/ml  --  0.58* 0.62* 1.44* 1.66* 3.98*       Lines/Drains:  Invasive Devices       Peripheral Intravenous Line  Duration             Peripheral IV 08/25/24 Left;Proximal;Ventral (anterior) Forearm 3 days              Drain  Duration             Gastrostomy/Enterostomy  16 Fr. LUQ 2 days                           Imaging: Reviewed radiology reports from this admission including: chest CT scan and CT neck    Recent Cultures (last 7 days):         Last 24 Hours Medication List:   Current Facility-Administered Medications   Medication Dose Route Frequency Provider Last Rate    acetaminophen  650 mg Oral Q4H PRN Rosalino P Allsbrook, DO      ammonium lactate   Topical BID PRN Rosalino P Allsbrook, DO      ARIPiprazole  2 mg Per G Tube HS Reg Canada MD      artificial tear   Both Eyes HS PRN Rosalino P Jersonsbrook, DO      vitamin C  1,000 mg Per G Tube Daily Reg Canada MD      baclofen  10 mg Per G Tube TID Reg Canada MD      calcium carbonate  750 mg Oral BID Rosalino P Allsbrook, DO      carbamide peroxide  2 drop Both Ears BID PRN Rosalino P Allsbrook, DO      carbidopa-levodopa  2 tablet Oral BID Rosalino P Allsbrook, DO      cholecalciferol  1,000 Units Per G Tube Daily Reg Canada MD      citalopram  40 mg Per G Tube Daily Reg Canada MD      dextromethorphan-guaiFENesin  10 mL Per G Tube Q4H PRN Reg Canada MD      docosanol   Topical 5x Daily Rosalino P Allsbrook, DO      enoxaparin  40 mg Subcutaneous Daily Rosalino P Allsbrook, DO      Ferrous Sulfate  300 mg Oral BID AC Rosalino P Allsbrook, DO      fluticasone  1 spray Nasal Daily PRN Rosalino P Allsbrook, DO      hydrocortisone   Topical 4x Daily PRN Rosalino P Allsbrook, DO      insulin lispro  1-6 Units Subcutaneous Q6H Katie Poole MD      Ketotifen Fumarate  1 drop Both Eyes Daily PRN Rosalino P Allsbrook, DO      levothyroxine  25 mcg Per G Tube QAM Rosalino P Allsbrook, DO      Loratadine  10 mg Oral Daily PRN Rosalino P Allsbrook, DO      LORazepam  0.5 mg Per G Tube Daily Reg Canada MD      methenamine hippurate  1 g Oral BID Reg Canada MD      omeprazole (PRILOSEC) suspension 2 mg/mL  20 mg Oral Daily Rosalino P Jersonsprieto, DO      oxybutynin  5 mg Per G Tube TID Reg Canada MD      oxyCODONE   2.5 mg Per G Tube Q4H PRN Rosalino P Allsbrook, DO      oxyCODONE  5 mg Per G Tube Q4H PRN Rosalino P Allsbrook, DO      polyethylene glycol  17 g Oral Daily Rosalino P Allsbrook, DO      pravastatin  40 mg Per G Tube Daily With Dinner Reg Canada MD      traZODone  100 mg Per G Tube HS Reg Canada MD      valACYclovir  1,000 mg Per G Tube Q12H [facility] Reg Canada MD          Today, Patient Was Seen By: Silvana Mike DO  PGY-1    **Please Note: This note may have been constructed using a voice recognition system.**

## 2024-08-28 NOTE — ASSESSMENT & PLAN NOTE
Lab Results   Component Value Date    HGBA1C 6.2 (H) 08/13/2024       Recent Labs     08/28/24  0105 08/28/24  0128 08/28/24  0714 08/28/24  1202   POCGLU 254* 261* 387* 362*       Blood Sugar Average: Last 72 hrs:  (P) 224.0695934534298743  Plan  Sliding scale: increased insulin lispro to 1-6 units  Q6h

## 2024-08-28 NOTE — ASSESSMENT & PLAN NOTE
Bennie Schaeffer is a 62 year old male Subacromial Bursitis, AC Joint Arthritis, Loose Body and Glenohumeral Osteoarthritis; LEFT Shoulder.     Plan as follows:  1. Reviewed the MRI images.... degenerative changes more prominent than on x-ray. Loose Body(s) as well as some AC Joint degenerative change. Rotator Cuff looked pretty good.   2. Discussed his symptoms and treatment options... surgery is likely the next step and this was discussed. He is clearly not ready for a total shoulder as of yet.... discussed arthroscopy to remove any loose bodies.... explaining that there is a chance that the loose bodies are not actually intra-articular, but certainly can perform a decompression and distal clavicle resection to help as well. No guarantee of complete pain relief but at least should improve the catching her gets.   3. Non-surgical is always an option as well.   4. Reviewed the diagnosis and treatment options at this point. Surgery is the next step to consider. Surgery was explained in detail, the procedure, the risks and benefits and the post-operative rehab and recovery. All questions were answered. When ready, the patient will contact my Surgical Scheduler to arrange for LEFT Shoulder Arthroscopic Removal of Loose Body, Distal Clavicle Resection and Acromioplasty  5. He will consider his options and contact my surgery scheduler to proceed when ready.    Previous culture 8/20-grew E. coli, pansensitive  CT abdomen pelvis demonstrates wall thickening of urinary bladder  Concurrent aspiration pneumonia  ABX course complete

## 2024-08-28 NOTE — ASSESSMENT & PLAN NOTE
CT neck no organic obstructions in the mediastinum. Pt is POD2 s/p open gastrostomy tube placement. Initiated trickle feeds yesterday. Per group home, pt attends day program which cannot manage tube feeds so requesting her to receive it from 6pm to 6am. Settings adjusted to take this into consideration per nutrition.     Plan:  Continue Jevity 1.5.   Per nutrition - goal rate is 70 cc/hr x 12h (6pm-6am)  Restart TF @ 40 mL at 8/26 6pm. Increase by 10 cc/hr q4h as tolerated until goal rate is achieved. 60 mL water flush before & after TF cycle, and additional 500 mL water daily that can be divided amongst med passes.

## 2024-08-28 NOTE — QUICK NOTE
Patient was seen and evaluated at bedside by the general surgery team this morning. Tube feeds may be advanced to goal as tolerated.     General surgery team will sign off at this time, please reach out with any questions or concerns.     Nedra Lloyd MD  PGY1 General Surgery

## 2024-08-29 LAB
ALBUMIN SERPL BCG-MCNC: 2.8 G/DL (ref 3.5–5)
ALP SERPL-CCNC: 57 U/L (ref 34–104)
ALT SERPL W P-5'-P-CCNC: 5 U/L (ref 7–52)
ANION GAP SERPL CALCULATED.3IONS-SCNC: 7 MMOL/L (ref 4–13)
AST SERPL W P-5'-P-CCNC: 5 U/L (ref 13–39)
BASOPHILS # BLD AUTO: 0.03 THOUSANDS/ΜL (ref 0–0.1)
BASOPHILS NFR BLD AUTO: 1 % (ref 0–1)
BILIRUB SERPL-MCNC: 0.18 MG/DL (ref 0.2–1)
BUN SERPL-MCNC: 11 MG/DL (ref 5–25)
CALCIUM ALBUM COR SERPL-MCNC: 10.1 MG/DL (ref 8.3–10.1)
CALCIUM SERPL-MCNC: 9.1 MG/DL (ref 8.4–10.2)
CHLORIDE SERPL-SCNC: 101 MMOL/L (ref 96–108)
CO2 SERPL-SCNC: 31 MMOL/L (ref 21–32)
CREAT SERPL-MCNC: 0.84 MG/DL (ref 0.6–1.3)
EOSINOPHIL # BLD AUTO: 0.2 THOUSAND/ΜL (ref 0–0.61)
EOSINOPHIL NFR BLD AUTO: 4 % (ref 0–6)
ERYTHROCYTE [DISTWIDTH] IN BLOOD BY AUTOMATED COUNT: 13.3 % (ref 11.6–15.1)
GFR SERPL CREATININE-BSD FRML MDRD: 74 ML/MIN/1.73SQ M
GLUCOSE SERPL-MCNC: 130 MG/DL (ref 65–140)
GLUCOSE SERPL-MCNC: 193 MG/DL (ref 65–140)
GLUCOSE SERPL-MCNC: 200 MG/DL (ref 65–140)
GLUCOSE SERPL-MCNC: 314 MG/DL (ref 65–140)
GLUCOSE SERPL-MCNC: 433 MG/DL (ref 65–140)
GLUCOSE SERPL-MCNC: 479 MG/DL (ref 65–140)
GLUCOSE SERPL-MCNC: 529 MG/DL (ref 65–140)
GLUCOSE SERPL-MCNC: 561 MG/DL (ref 65–140)
GLUCOSE SERPL-MCNC: 563 MG/DL (ref 65–140)
GLUCOSE SERPL-MCNC: 588 MG/DL (ref 65–140)
GLUCOSE SERPL-MCNC: 97 MG/DL (ref 65–140)
HCT VFR BLD AUTO: 29 % (ref 34.8–46.1)
HGB BLD-MCNC: 9.3 G/DL (ref 11.5–15.4)
IMM GRANULOCYTES # BLD AUTO: 0.04 THOUSAND/UL (ref 0–0.2)
IMM GRANULOCYTES NFR BLD AUTO: 1 % (ref 0–2)
LYMPHOCYTES # BLD AUTO: 1.16 THOUSANDS/ΜL (ref 0.6–4.47)
LYMPHOCYTES NFR BLD AUTO: 21 % (ref 14–44)
MAGNESIUM SERPL-MCNC: 2.2 MG/DL (ref 1.9–2.7)
MCH RBC QN AUTO: 32.3 PG (ref 26.8–34.3)
MCHC RBC AUTO-ENTMCNC: 32.1 G/DL (ref 31.4–37.4)
MCV RBC AUTO: 101 FL (ref 82–98)
MONOCYTES # BLD AUTO: 0.56 THOUSAND/ΜL (ref 0.17–1.22)
MONOCYTES NFR BLD AUTO: 10 % (ref 4–12)
NEUTROPHILS # BLD AUTO: 3.59 THOUSANDS/ΜL (ref 1.85–7.62)
NEUTS SEG NFR BLD AUTO: 63 % (ref 43–75)
NRBC BLD AUTO-RTO: 0 /100 WBCS
PHOSPHATE SERPL-MCNC: 4.7 MG/DL (ref 2.3–4.1)
PLATELET # BLD AUTO: 240 THOUSANDS/UL (ref 149–390)
PMV BLD AUTO: 11.3 FL (ref 8.9–12.7)
POTASSIUM SERPL-SCNC: 4.1 MMOL/L (ref 3.5–5.3)
PROT SERPL-MCNC: 5.3 G/DL (ref 6.4–8.4)
RBC # BLD AUTO: 2.88 MILLION/UL (ref 3.81–5.12)
SODIUM SERPL-SCNC: 139 MMOL/L (ref 135–147)
WBC # BLD AUTO: 5.58 THOUSAND/UL (ref 4.31–10.16)

## 2024-08-29 PROCEDURE — 80053 COMPREHEN METABOLIC PANEL: CPT

## 2024-08-29 PROCEDURE — 99232 SBSQ HOSP IP/OBS MODERATE 35: CPT | Performed by: HOSPITALIST

## 2024-08-29 PROCEDURE — 84100 ASSAY OF PHOSPHORUS: CPT

## 2024-08-29 PROCEDURE — 85025 COMPLETE CBC W/AUTO DIFF WBC: CPT

## 2024-08-29 PROCEDURE — 82948 REAGENT STRIP/BLOOD GLUCOSE: CPT

## 2024-08-29 PROCEDURE — 83735 ASSAY OF MAGNESIUM: CPT

## 2024-08-29 RX ORDER — INSULIN LISPRO 100 [IU]/ML
2-12 INJECTION, SOLUTION INTRAVENOUS; SUBCUTANEOUS
Status: DISCONTINUED | OUTPATIENT
Start: 2024-08-29 | End: 2024-08-29

## 2024-08-29 RX ORDER — INSULIN LISPRO 100 [IU]/ML
10 INJECTION, SOLUTION INTRAVENOUS; SUBCUTANEOUS ONCE
Status: DISCONTINUED | OUTPATIENT
Start: 2024-08-29 | End: 2024-08-29

## 2024-08-29 RX ORDER — INSULIN LISPRO 100 [IU]/ML
2-12 INJECTION, SOLUTION INTRAVENOUS; SUBCUTANEOUS EVERY 6 HOURS
Status: DISCONTINUED | OUTPATIENT
Start: 2024-08-29 | End: 2024-08-30

## 2024-08-29 RX ORDER — POTASSIUM CHLORIDE 20MEQ/15ML
20 LIQUID (ML) ORAL ONCE
Status: COMPLETED | OUTPATIENT
Start: 2024-08-29 | End: 2024-08-29

## 2024-08-29 RX ADMIN — BACLOFEN 10 MG: 10 TABLET ORAL at 22:18

## 2024-08-29 RX ADMIN — DOCOSANOL: 100 CREAM TOPICAL at 08:41

## 2024-08-29 RX ADMIN — Medication 1000 UNITS: at 08:36

## 2024-08-29 RX ADMIN — POLYETHYLENE GLYCOL 3350 17 G: 17 POWDER, FOR SOLUTION ORAL at 08:36

## 2024-08-29 RX ADMIN — DOCOSANOL: 100 CREAM TOPICAL at 17:25

## 2024-08-29 RX ADMIN — OXYCODONE HYDROCHLORIDE AND ACETAMINOPHEN 1000 MG: 500 TABLET ORAL at 08:35

## 2024-08-29 RX ADMIN — POTASSIUM CHLORIDE 20 MEQ: 1.5 SOLUTION ORAL at 08:36

## 2024-08-29 RX ADMIN — BACLOFEN 10 MG: 10 TABLET ORAL at 08:36

## 2024-08-29 RX ADMIN — DOCOSANOL: 100 CREAM TOPICAL at 22:17

## 2024-08-29 RX ADMIN — DOCOSANOL: 100 CREAM TOPICAL at 12:29

## 2024-08-29 RX ADMIN — CARBIDOPA AND LEVODOPA 2 TABLET: 25; 100 TABLET, EXTENDED RELEASE ORAL at 08:36

## 2024-08-29 RX ADMIN — INSULIN LISPRO 1 UNITS: 100 INJECTION, SOLUTION INTRAVENOUS; SUBCUTANEOUS at 02:20

## 2024-08-29 RX ADMIN — SODIUM CHLORIDE 10 UNITS/HR: 9 INJECTION, SOLUTION INTRAVENOUS at 07:54

## 2024-08-29 RX ADMIN — METFORMIN HYDROCHLORIDE 1000 MG: 500 TABLET ORAL at 17:24

## 2024-08-29 RX ADMIN — FERROUS SULFATE 300 MG: 300 SOLUTION ORAL at 17:24

## 2024-08-29 RX ADMIN — CALCIUM CARBONATE (ANTACID) CHEW TAB 500 MG 750 MG: 500 CHEW TAB at 22:18

## 2024-08-29 RX ADMIN — ENOXAPARIN SODIUM 40 MG: 40 INJECTION SUBCUTANEOUS at 08:36

## 2024-08-29 RX ADMIN — OXYBUTYNIN CHLORIDE 5 MG: 5 TABLET ORAL at 17:24

## 2024-08-29 RX ADMIN — OXYBUTYNIN CHLORIDE 5 MG: 5 TABLET ORAL at 22:19

## 2024-08-29 RX ADMIN — CALCIUM CARBONATE (ANTACID) CHEW TAB 500 MG 750 MG: 500 CHEW TAB at 08:35

## 2024-08-29 RX ADMIN — OXYBUTYNIN CHLORIDE 5 MG: 5 TABLET ORAL at 08:36

## 2024-08-29 RX ADMIN — VALACYCLOVIR 1000 MG: 500 TABLET, FILM COATED ORAL at 22:18

## 2024-08-29 RX ADMIN — FERROUS SULFATE 300 MG: 300 SOLUTION ORAL at 06:11

## 2024-08-29 RX ADMIN — Medication 20 MG: at 08:38

## 2024-08-29 RX ADMIN — PRAVASTATIN SODIUM 40 MG: 40 TABLET ORAL at 17:24

## 2024-08-29 RX ADMIN — BACLOFEN 10 MG: 10 TABLET ORAL at 17:24

## 2024-08-29 RX ADMIN — LEVOTHYROXINE SODIUM 25 MCG: 25 TABLET ORAL at 08:36

## 2024-08-29 RX ADMIN — CITALOPRAM HYDROBROMIDE 40 MG: 20 TABLET ORAL at 08:35

## 2024-08-29 RX ADMIN — METHENAMINE HIPPURATE 1 G: 1 TABLET ORAL at 08:38

## 2024-08-29 RX ADMIN — TRAZODONE HYDROCHLORIDE 100 MG: 100 TABLET ORAL at 19:51

## 2024-08-29 RX ADMIN — LORAZEPAM 0.5 MG: 0.5 TABLET ORAL at 19:51

## 2024-08-29 RX ADMIN — OXYCODONE HYDROCHLORIDE 5 MG: 5 SOLUTION ORAL at 19:51

## 2024-08-29 RX ADMIN — CARBIDOPA AND LEVODOPA 2 TABLET: 25; 100 TABLET, EXTENDED RELEASE ORAL at 17:24

## 2024-08-29 RX ADMIN — ARIPIPRAZOLE 2 MG: 2 TABLET ORAL at 22:18

## 2024-08-29 RX ADMIN — METHENAMINE HIPPURATE 1 G: 1 TABLET ORAL at 17:25

## 2024-08-29 RX ADMIN — VALACYCLOVIR 1000 MG: 500 TABLET, FILM COATED ORAL at 08:35

## 2024-08-29 NOTE — ASSESSMENT & PLAN NOTE
Lab Results   Component Value Date    HGBA1C 6.2 (H) 08/13/2024     Recent Labs     08/31/24  0231 08/31/24  0504 08/31/24  0703 08/31/24  0927   POCGLU 254* 209* 256* 298*     Blood Sugar Average: Last 72 hrs:  (P) 296.1689800417569553    PMHx of diabetes. Pt having hyperglycemic episodes in the setting of tube feeds, possibly related to refeeding syndrome although pt has not had hypokalemia, hypomagnesemia, hypophosphatemia. Required insulin drip on 8/29 as BG>500, discontinued when BG<200. However, pt became hyperglycemic overnight 8/30 in the setting of tube feeds, MAP ~54 w minimal response to fluids and albumin, transferred to ICU. Per review of crit care notes, pt was briefly on Levophed; low suspicion of infectious etiology as pt has not had leukocytosis and has been afebrile, imaging unremarkable. Pt stable, nutrition switched feed from Jevity 1.5 to Glucerna 1.2, transferred back to med/surg on 8/31. Continue sliding scale, algorithm 3. Initiating insulin lantus 10 units bedtime to achieve better sustained control of BG.    Plan:  - Increasing to 15u q HS, first dose tonight 9/1  - Continue insulin sliding scale, algorithm 3.  - BG checks q6h  - Repeat BMP, Magnesium  - Continue to hold metformin until repeat lactic acid normalizes

## 2024-08-29 NOTE — ASSESSMENT & PLAN NOTE
History of mood disturbances, previously on neuroleptics  Tardive dyskinesia on exam    Sinemet  qid. Can defer changes to outpatient neurology.

## 2024-08-29 NOTE — ASSESSMENT & PLAN NOTE
CT neck no organic obstructions in the mediastinum. Pt has had poor PO intake, malnutrition requiring gastrostomy tube placement on 08/26. Per group home, pt attends day program which cannot manage tube feeds so requesting her to receive it from 6pm to 6am. Settings adjusted to take this into consideration per nutrition.    Plan:  Per Nutrition: Recommend trial cyclic feeds of Glucerna 1.2 @ 80 ml/hr X 12 hrs (Run 6 pm-6am). This provides 960 ml total volume, 1152 kcals, 58 g protein, and 109 g CHO. If blood sugars remain elevated on cyclic feeds, switch to continuous feeds of Glucerna 1.2 @ 40 ml/hr X 24 hrs to see if lower volume/more time improves blood sugars. Re-consult RD as needed.   - Continue Glucerna 1.2 as tolerated per nutrition   - increase by 10 cc/hr q4h until goal rate achieved  - 60 mL water flush before & after TF cycle, and additional 500 mL water daily that can be divided amongst med passes  Per SLP: can introduce pleasure feeds or puree & HTL as tolerated  Currently at tube feed rate of 60, will increase to 70 later today evening  Lantus will be increased to 15 unites HS tonight  CXR and procal ordered for 9/2 am to assess for possibility of aspiration

## 2024-08-29 NOTE — PROGRESS NOTES
[facility]  Progress Note  Name: Terrie Alicea I  MRN: [id number]  Unit/Bed#: S -01 I Date of Admission: 8/24/2024   Date of Service: 8/29/2024 I Hospital Day: 5    Assessment & Plan   * Dysphagia  Assessment & Plan  CT neck no organic obstructions in the mediastinum. Pt is POD2 s/p open gastrostomy tube placement. Initiated trickle feeds yesterday. Per group home, pt attends day program which cannot manage tube feeds so requesting her to receive it from 6pm to 6am. Settings adjusted to take this into consideration per nutrition. Overnight pt reached 60 cc/hr, goal is 70 cc/hr.    Plan:  Continue Jevity 1.5. Per nutrition - goal rate is 70 cc/hr x 12h (6pm-6am)   - overnight pt reached 60 cc/hr   - tonight increase by 10 cc/hr q4h until goal rate of 70 cc/h is achieved  - 60 mL water flush before & after TF cycle, and additional 500 mL water daily that can be divided amongst med passes.    DM (diabetes mellitus) (HCC)  Assessment & Plan  Lab Results   Component Value Date    HGBA1C 6.2 (H) 08/13/2024     Recent Labs     08/29/24  0943 08/29/24  1036 08/29/24  1200 08/29/24  1345   POCGLU 314* 193* 97 130     Blood Sugar Average: Last 72 hrs:  (P) 296.75    Pt w hyperglycemic episodes in the setting of tube feeds, possibly related to refeeding syndrome. However, low suspicion for this as pt does not have hypokalemia, hypomagnesemia, hypophosphatemia, etc. Pt is asymptomatic, stable, and appears alert and awake. Initiated insulin drip as BG > 500, administered K 20 mEq, with q1h BG checks, discontinued insulin drip when BG < 200. Pt has been stable on sliding scale insulin.    Plan:  - Continue insulin sliding scale, algorithm 4.  - BG checks q6h  - Repeat BMP, Magnesium, Phosphorous labs tomorrow AM    Mood disturbances  Assessment & Plan  Continue PTA   Abilify 2 mg  Citalopram 40 mg  Trazodone 100 mg      Parkinson's disease  Assessment & Plan  History of mood disturbances,  previously on neuroleptics  Tardive dyskinesia on exam    Sinemet  BID. Can defer changes to outpatient neurology.     Incontinence  Assessment & Plan  Continue oxybutynin 5 mg TID    UTI (urinary tract infection)  Assessment & Plan  Previous culture -grew E. coli, pansensitive  CT abdomen pelvis demonstrates wall thickening of urinary bladder  Concurrent aspiration pneumonia  ABX course complete      Cerebral palsy (HCC)  Assessment & Plan  Continue baclofen 10 mg TID           VTE Pharmacologic Prophylaxis: VTE Score: 7 High Risk (Score >/= 5) - Pharmacological DVT Prophylaxis Ordered: enoxaparin (Lovenox). Sequential Compression Devices Ordered.    Mobility:   Basic Mobility Inpatient Raw Score: 6  JH-HLM Goal: 2: Bed activities/Dependent transfer  JH-HLM Achieved: 2: Bed activities/Dependent transfer  JH-HLM Goal achieved. Continue to encourage appropriate mobility.    Patient Centered Rounds: I performed bedside rounds with nursing staff today.  Discussions with Specialists or Other Care Team Provider: Discussed with RN regarding BG checks    Education and Discussions with Family / Patient: Updated  (caregiver, Lisa) via phone.    Current Length of Stay: 5 day(s)  Current Patient Status: Inpatient   Discharge Plan: Anticipate discharge in 24-48 hrs to group home as pt tolerates feeds.    Code Status: Level 1 - Full Code    Subjective:   Pt seen and evaluated at bedside for continuity of care. Pt's BG have been elevated throughout the night in the setting of tube feeds. Per nursing report, no behavioral or functional changes overnight, not noted to be obtunded or lethargic despite hyperglycemia. At the time of this encounter, pt awake and opens eyes to verbal stimuli. Pt is POD3 s/p G-tube insertion. Not observed to be in any acute distress.     Objective:     Vitals:   Temp (24hrs), Av.2 °F (37.3 °C), Min:99.1 °F (37.3 °C), Max:99.5 °F (37.5 °C)    Temp:  [99.1 °F (37.3 °C)-99.5  °F (37.5 °C)] 99.5 °F (37.5 °C)  HR:  [] 65  Resp:  [16-18] 18  BP: ()/(61-76) 120/76  SpO2:  [92 %-95 %] 94 %  Body mass index is 19.26 kg/m².     Input and Output Summary (last 24 hours):     Intake/Output Summary (Last 24 hours) at 8/29/2024 1446  Last data filed at 8/29/2024 0951  Gross per 24 hour   Intake 200 ml   Output 2029 ml   Net -1829 ml       Physical Exam:   Physical Exam  Vitals reviewed.   Constitutional:       General: She is not in acute distress.     Appearance: She is underweight.   HENT:      Head: Atraumatic.   Eyes:      Conjunctiva/sclera: Conjunctivae normal.   Cardiovascular:      Rate and Rhythm: Normal rate and regular rhythm.      Pulses: Normal pulses.      Heart sounds: Normal heart sounds.   Pulmonary:      Effort: Pulmonary effort is normal. No respiratory distress.      Breath sounds: Normal breath sounds.   Abdominal:      General: There is no distension.      Tenderness: There is no guarding.      Comments: G-tube in place, no erythema or warmth noted   Musculoskeletal:         General: Deformity (R arm, baseline) present.   Skin:     General: Skin is warm.      Capillary Refill: Capillary refill takes less than 2 seconds.   Neurological:      Mental Status: She is alert. Mental status is at baseline.          Additional Data:     Labs:  Results from last 7 days   Lab Units 08/29/24  0526   WBC Thousand/uL 5.58   HEMOGLOBIN g/dL 9.3*   HEMATOCRIT % 29.0*   PLATELETS Thousands/uL 240   SEGS PCT % 63   LYMPHO PCT % 21   MONO PCT % 10   EOS PCT % 4     Results from last 7 days   Lab Units 08/29/24  0526   SODIUM mmol/L 139   POTASSIUM mmol/L 4.1   CHLORIDE mmol/L 101   CO2 mmol/L 31   BUN mg/dL 11   CREATININE mg/dL 0.84   ANION GAP mmol/L 7   CALCIUM mg/dL 9.1   ALBUMIN g/dL 2.8*   TOTAL BILIRUBIN mg/dL 0.18*   ALK PHOS U/L 57   ALT U/L 5*   AST U/L 5*   GLUCOSE RANDOM mg/dL 588*         Results from last 7 days   Lab Units 08/29/24  1345 08/29/24  1200 08/29/24  1036  08/29/24  0943 08/29/24  0825 08/29/24  0731 08/29/24  0602 08/29/24  0601 08/28/24  1957 08/28/24  1202 08/28/24  0714 08/28/24  0128   POC GLUCOSE mg/dl 130 97 193* 314* 479* 563* 529* 561* 268* 362* 387* 261*         Results from last 7 days   Lab Units 08/27/24  1151 08/27/24  0538 08/26/24  0451 08/25/24  0344 08/24/24  1740 08/23/24  0522   LACTIC ACID mmol/L 1.5  --   --   --   --   --    PROCALCITONIN ng/ml  --  0.58* 0.62* 1.44* 1.66* 3.98*       Lines/Drains:  Invasive Devices       Peripheral Intravenous Line  Duration             Peripheral IV 08/28/24 Left;Proximal;Ventral (anterior) Forearm <1 day              Drain  Duration             Gastrostomy/Enterostomy  16 Fr. LUQ 2 days                          Imaging: Reviewed radiology reports from this admission including: chest CT scan and CT neck    Recent Cultures (last 7 days):         Last 24 Hours Medication List:   Current Facility-Administered Medications   Medication Dose Route Frequency Provider Last Rate    acetaminophen  650 mg Oral Q4H PRN Rosalino P Allsbrook, DO      ammonium lactate   Topical BID PRN Rosalino P Allsbrook, DO      ARIPiprazole  2 mg Per G Tube HS Reg Canada MD      artificial tear   Both Eyes HS PRN Roslaino P Jersonsprieto, DO      vitamin C  1,000 mg Per G Tube Daily Reg Canada MD      baclofen  10 mg Per G Tube TID Reg Canada MD      calcium carbonate  750 mg Oral BID Rosalino P Allsbrook, DO      carbamide peroxide  2 drop Both Ears BID PRN Rosalino P Allsbrook, DO      carbidopa-levodopa  2 tablet Oral BID Rosalino P Jersonsprieto, DO      cholecalciferol  1,000 Units Per G Tube Daily Reg Canada MD      citalopram  40 mg Per G Tube Daily Reg Canada MD      dextromethorphan-guaiFENesin  10 mL Per G Tube Q4H PRN Reg Canada MD      docosanol   Topical 5x Daily Rosalino P Jersonsprieto, DO      enoxaparin  40 mg Subcutaneous Daily Rosalino P Allsbrook,       Ferrous Sulfate  300 mg Oral BID AC Rosalino P Stephon, DO       fluticasone  1 spray Nasal Daily PRN Rosalino P Allsbrook, DO      hydrocortisone   Topical 4x Daily PRN Rosalino P Allsbrook, DO      insulin lispro  2-12 Units Subcutaneous Q6H Silvana Mike DO      Ketotifen Fumarate  1 drop Both Eyes Daily PRN Rosalino P Allsbrook, DO      levothyroxine  25 mcg Per G Tube QAM Rosalino P Allsbrook, DO      Loratadine  10 mg Oral Daily PRN Rosalino P Allsbrook, DO      LORazepam  0.5 mg Per G Tube Daily Reg Canada MD      metFORMIN  1,000 mg Oral BID With Meals Katie Poole MD      methenamine hippurate  1 g Oral BID Reg Canada MD      omeprazole (PRILOSEC) suspension 2 mg/mL  20 mg Oral Daily Rosalino P Allsbrook, DO      oxybutynin  5 mg Per G Tube TID Reg Canada MD      oxyCODONE  2.5 mg Per G Tube Q4H PRN Rosalino P Allsbrook, DO      oxyCODONE  5 mg Per G Tube Q4H PRN Rosalino P Allsbrook, DO      polyethylene glycol  17 g Oral Daily Rosalino P Allsbrook, DO      pravastatin  40 mg Per G Tube Daily With Dinner Reg Canada MD      traZODone  100 mg Per G Tube HS Reg Candaa MD      valACYclovir  1,000 mg Per G Tube Q12H [facility] Reg Canada MD          Today, Patient Was Seen By: Silvana Mike DO  PGY-1    **Please Note: This note may have been constructed using a voice recognition system.**

## 2024-08-29 NOTE — ASSESSMENT & PLAN NOTE
Previous culture 8/20-grew E. coli, pansensitive  CT abdomen pelvis demonstrates wall thickening of urinary bladder  Concurrent aspiration pneumonia  ABX course complete

## 2024-08-29 NOTE — PLAN OF CARE
Problem: Nutrition/Hydration-ADULT  Goal: Nutrient/Hydration intake appropriate for improving, restoring or maintaining nutritional needs  Description: Monitor and assess patient's nutrition/hydration status for malnutrition. Collaborate with interdisciplinary team and initiate plan and interventions as ordered.  Monitor patient's weight and dietary intake as ordered or per policy. Utilize nutrition screening tool and intervene as necessary. Determine patient's food preferences and provide high-protein, high-caloric foods as appropriate.     INTERVENTIONS:  - Monitor oral intake, urinary output, labs, and treatment plans  - Assess nutrition and hydration status and recommend course of action  - Evaluate amount of meals eaten  - Assist patient with eating if necessary   - Allow adequate time for meals  - Recommend/ encourage appropriate diets, oral nutritional supplements, and vitamin/mineral supplements  - Order, calculate, and assess calorie counts as needed  - Recommend, monitor, and adjust tube feedings and TPN/PPN based on assessed needs  - Assess need for intravenous fluids  - Provide specific nutrition/hydration education as appropriate  - Include patient/family/caregiver in decisions related to nutrition  Outcome: Progressing     Problem: Prexisting or High Potential for Compromised Skin Integrity  Goal: Skin integrity is maintained or improved  Description: INTERVENTIONS:  - Identify patients at risk for skin breakdown  - Assess and monitor skin integrity  - Assess and monitor nutrition and hydration status  - Monitor labs   - Assess for incontinence   - Turn and reposition patient  - Assist with mobility/ambulation  - Relieve pressure over bony prominences  - Avoid friction and shearing  - Provide appropriate hygiene as needed including keeping skin clean and dry  - Evaluate need for skin moisturizer/barrier cream  - Collaborate with interdisciplinary team   - Patient/family teaching  - Consider wound  care consult   Outcome: Progressing     Problem: Potential for Falls  Goal: Patient will remain free of falls  Description: INTERVENTIONS:  - Educate patient/family on patient safety including physical limitations  - Instruct patient to call for assistance with activity   - Consult OT/PT to assist with strengthening/mobility   - Keep Call bell within reach  - Keep bed low and locked with side rails adjusted as appropriate  - Keep care items and personal belongings within reach  - Initiate and maintain comfort rounds  - Make Fall Risk Sign visible to staff  - Offer Toileting every  Hours, in advance of need  - Initiate/Maintain alarm  - Obtain necessary fall risk management equipmen  - Apply yellow socks and bracelet for high fall risk patients  - Consider moving patient to room near nurses station  Outcome: Progressing

## 2024-08-29 NOTE — PLAN OF CARE
Problem: Nutrition/Hydration-ADULT  Goal: Nutrient/Hydration intake appropriate for improving, restoring or maintaining nutritional needs  Description: Monitor and assess patient's nutrition/hydration status for malnutrition. Collaborate with interdisciplinary team and initiate plan and interventions as ordered.  Monitor patient's weight and dietary intake as ordered or per policy. Utilize nutrition screening tool and intervene as necessary. Determine patient's food preferences and provide high-protein, high-caloric foods as appropriate.     INTERVENTIONS:  - Monitor oral intake, urinary output, labs, and treatment plans  - Assess nutrition and hydration status and recommend course of action  - Evaluate amount of meals eaten  - Assist patient with eating if necessary   - Allow adequate time for meals  - Recommend/ encourage appropriate diets, oral nutritional supplements, and vitamin/mineral supplements  - Order, calculate, and assess calorie counts as needed  - Recommend, monitor, and adjust tube feedings and TPN/PPN based on assessed needs  - Assess need for intravenous fluids  - Provide specific nutrition/hydration education as appropriate  - Include patient/family/caregiver in decisions related to nutrition  Outcome: Progressing     Problem: Prexisting or High Potential for Compromised Skin Integrity  Goal: Skin integrity is maintained or improved  Description: INTERVENTIONS:  - Identify patients at risk for skin breakdown  - Assess and monitor skin integrity  - Assess and monitor nutrition and hydration status  - Monitor labs   - Assess for incontinence   - Turn and reposition patient  - Assist with mobility/ambulation  - Relieve pressure over bony prominences  - Avoid friction and shearing  - Provide appropriate hygiene as needed including keeping skin clean and dry  - Evaluate need for skin moisturizer/barrier cream  - Collaborate with interdisciplinary team   - Patient/family teaching  - Consider wound  care consult   Outcome: Progressing     Problem: Potential for Falls  Goal: Patient will remain free of falls  Description: INTERVENTIONS:  - Educate patient/family on patient safety including physical limitations  - Instruct patient to call for assistance with activity   - Consult OT/PT to assist with strengthening/mobility   - Keep Call bell within reach  - Keep bed low and locked with side rails adjusted as appropriate  - Keep care items and personal belongings within reach  - Initiate and maintain comfort rounds  - Make Fall Risk Sign visible to staff  - Offer Toileting every  Hours, in advance of need  - Initiate/Maintain alarm  - Obtain necessary fall risk management equipment  - Apply yellow socks and bracelet for high fall risk patients  - Consider moving patient to room near nurses station  Outcome: Progressing     Problem: GASTROINTESTINAL - ADULT  Goal: Minimal or absence of nausea and/or vomiting  Description: INTERVENTIONS:  - Administer IV fluids if ordered to ensure adequate hydration  - Maintain NPO status until nausea and vomiting are resolved  - Nasogastric tube if ordered  - Administer ordered antiemetic medications as needed  - Provide nonpharmacologic comfort measures as appropriate  - Advance diet as tolerated, if ordered  - Consider nutrition services referral to assist patient with adequate nutrition and appropriate food choices  Outcome: Progressing  Goal: Maintains or returns to baseline bowel function  Description: INTERVENTIONS:  - Assess bowel function  - Encourage oral fluids to ensure adequate hydration  - Administer IV fluids if ordered to ensure adequate hydration  - Administer ordered medications as needed  - Encourage mobilization and activity  - Consider nutritional services referral to assist patient with adequate nutrition and appropriate food choices  Outcome: Progressing  Goal: Maintains adequate nutritional intake  Description: INTERVENTIONS:  - Monitor percentage of each  meal consumed  - Identify factors contributing to decreased intake, treat as appropriate  - Assist with meals as needed  - Monitor I&O, weight, and lab values if indicated  - Obtain nutrition services referral as needed  Outcome: Progressing  Goal: Oral mucous membranes remain intact  Description: INTERVENTIONS  - Assess oral mucosa and hygiene practices  - Implement preventative oral hygiene regimen  - Implement oral medicated treatments as ordered  - Initiate Nutrition services referral as needed  Outcome: Progressing

## 2024-08-29 NOTE — ASSESSMENT & PLAN NOTE
Continue home meds: Abilify 2 mg, Citalopram 40 mg.  Pt previously on trazodone 100 mg q HS which was discontinued on 8/30 while in the ICU d/t contributions to sedation.

## 2024-08-29 NOTE — UTILIZATION REVIEW
Continued Stay Review    Date: 8/29/24                           Current Patient Class: Inpatient  Current Level of Care: MS    HPI:64 y.o. female initially admitted on 8/24/24  with dysphagia. s/p open gastrostomy tube placement 8/26/24     Assessment/Plan: 8/29 POD #3  Initiated trickle feeds Jevity 1.5 on 8/27. G tube in place w/o erythema. Per group home, pt attends day program which cannot manage tube feeds so requesting her to receive it from 6pm to 6am. Settings adjusted yesterday to take this into consideration  Pt had been  receiving tube feed 70 ml/hr continuous  . Blood sugars elevated yesterday. Nutrition on 8/28 recommended to  restart at 6pm at 40 ml/hr advancing by 10 ml q 4 hours as tolerated to goal rate of 70 ml /hr. Flush 60 ml pre and post feeding.    Increased SSI insulin lispro to 1-6 units.    Overnight and this am, blood sugars elevated  blood sugars in 500's this am in setting of tube feeds , concomitant with hyperphosphatemia (4.7 up from 4.4) likely related to refeeding syndrome. K 4.0. Pt not noted to be obtunded or lethargic despite hyperglycemia. At the time of this encounter, pt awake and opens eyes to verbal stimuli .   Pt started on reg insulin drip this am  that was d/c'ed 4 hrs later with glucose improved to 193. SSI increased and pt started on metformin today . Potassium supplementation via oral solution 20 mEq .      Vital Signs (last 3 days)       Date/Time Temp Pulse Resp BP MAP (mmHg) SpO2 Calculated FIO2 (%) - Nasal Cannula Nasal Cannula O2 Flow Rate (L/min) O2 Device Cardiac (WDL) Patient Position - Orthostatic VS Pain    08/29/24 07:12:03 99.5 °F (37.5 °C) 65 18 120/76 91 94 % -- -- -- -- -- --    08/29/24 0030 -- -- -- -- -- 94 % 24 1 L/min Nasal cannula -- -- No Pain    08/28/24 22:06:12 99.1 °F (37.3 °C) 112 16 92/62 72 95 % -- -- -- -- -- --    08/28/24 15:45:17 99.1 °F (37.3 °C) 81 18 120/61 81 92 % -- -- -- -- -- --    08/28/24 07:18:19 98.5 °F (36.9 °C) 85 12 121/68  86 94 % -- -- -- -- -- --    08/28/24 0110 -- -- -- -- -- -- -- -- None (Room air) -- -- No Pain    08/28/24 00:11:01 -- 77 -- 94/56 69 99 % -- -- -- -- -- --    08/27/24 23:46:41 98.3 °F (36.8 °C) 87 -- 86/56 66 98 % -- -- -- -- -- No Pain    08/27/24 16:06:13 98.1 °F (36.7 °C) 73 16 104/50 68 97 % -- -- -- -- -- --    08/27/24 0900 -- -- -- -- -- -- -- -- -- -- -- No Pain    08/27/24 08:07:43 97.1 °F (36.2 °C) 56 18 108/71 83 96 % -- -- -- -- -- --    08/26/24 2354 -- -- -- -- -- -- -- -- -- -- -- 8    08/26/24 21:02:27 98.8 °F (37.1 °C) 70 16 113/75 88 99 % -- -- -- -- -- --    08/26/24 20:01:13 98.9 °F (37.2 °C) 79 12 114/76 89 94 % -- -- -- -- -- --    08/26/24 1913 -- -- -- -- -- -- -- -- -- -- -- No Pain    08/26/24 18:38:56 99.4 °F (37.4 °C) 84 16 121/88 99 97 % -- -- -- -- -- --    08/26/24 1800 97.5 °F (36.4 °C) 86 20 123/72 91 95 % 32 3 L/min Nasal cannula X -- --    08/26/24 1745 97.5 °F (36.4 °C) 78 21 132/79 97 99 % 32 3 L/min Nasal cannula X -- --    08/26/24 1730 -- 88 17 132/79 97 99 % 32 3 L/min Nasal cannula -- -- --    08/26/24 1729 -- 88 18 161/85 113 99 % 32 3 L/min Nasal cannula -- -- 10 - Worst Possible Pain    08/26/24 1715 97.5 °F (36.4 °C) 78 17 181/75 -- 98 % 40 5 L/min Nasal cannula X -- 10 - Worst Possible Pain    08/26/24 1446 97.2 °F (36.2 °C) 111 18 179/85 -- 95 % -- -- None (Room air) -- -- --    08/26/24 10:09:19 -- 104 -- 134/72 93 98 % -- -- -- -- -- --    08/26/24 0712 -- -- -- -- -- -- 28 2 L/min Nasal cannula -- -- --    08/26/24 0700 100 °F (37.8 °C) 85 18 111/68 82 92 % -- -- -- -- Lying --    08/26/24 06:58:44 100 °F (37.8 °C) 84 18 111/68 82 93 % -- -- -- -- -- --          Weight (last 2 days)       None              Pertinent Labs/Diagnostic Results:   Radiology:  CT soft tissue neck with contrast   Final Interpretation by Prudencio Rowan MD (08/24 1531)      No acute finding in the neck by CT. See additional findings above.            Workstation performed: [id number]          CT chest abdomen pelvis w contrast   Final Interpretation by Prudencio Rowan MD (08/24 1524)      Cystitis with wall thickening the urinary bladder and mucosal enhancement. No findings of upper urinary tract infection.      Distention of the colon probably representing chronic colonic pseudoobstruction or adynamic colonic ileus.      Decubitus pressure change in the subcutaneous tissues along the bilateral buttocks.      Increasing dependent density in the lungs probably representing increasing atelectasis and possible superimposed aspiration. Early aspiration pneumonia is not excluded.      Layering secretions in the trachea      Focus of hyperenhancement along the anterior lower uterine segment. Differential diagnosis would include a cervical mass or fibroid. Correlation with pelvic ultrasound or MRI may be considered.      The study was marked in EPIC for immediate notification.      Workstation performed: [id number]         XR chest 1 view portable   ED Interpretation by Jamin Castro DO (08/24 1002)   No infiltrate now apparent      Final Interpretation by Ezra Kaur MD (08/24 1216)      Clear lungs with interval resolution of the right basilar airspace opacity.            Workstation performed: [id number]                 Results from last 7 days   Lab Units 08/29/24  0526 08/28/24  1132 08/27/24  0538 08/26/24  0451 08/25/24  0344   WBC Thousand/uL 5.58 6.14 7.95 8.35 5.62   HEMOGLOBIN g/dL 9.3* 9.7* 9.4* 10.0* 9.6*   HEMATOCRIT % 29.0* 29.4* 29.5* 31.1* 29.4*   PLATELETS Thousands/uL 240 245 206 239 178   TOTAL NEUT ABS Thousands/µL 3.59 4.69 5.77  --   --          Results from last 7 days   Lab Units 08/29/24  0526 08/28/24  1132 08/27/24  0538 08/26/24  0451 08/25/24  0344   SODIUM mmol/L 139 140 136 143 144   POTASSIUM mmol/L 4.1 3.9 5.0 4.3 3.4*   CHLORIDE mmol/L 101 104 104 103 106   CO2 mmol/L 31 30 18* 29 31   ANION GAP mmol/L 7 6 14* 11 7   BUN mg/dL 11 12 16 12 16   CREATININE mg/dL 0.84  0.88 0.82 0.86 0.94   EGFR ml/min/1.73sq m 74 70 76 72 65   CALCIUM mg/dL 9.1 9.3 8.2* 9.5 9.4   MAGNESIUM mg/dL 2.2 1.7*  --   --   --    PHOSPHORUS mg/dL 4.7* 4.4*  --   --   --      Results from last 7 days   Lab Units 08/29/24  0526 08/28/24  1132 08/24/24  0951   AST U/L 5* 4* 17   ALT U/L 5* 5* 49   ALK PHOS U/L 57 56 88   TOTAL PROTEIN g/dL 5.3* 5.6* 6.4   ALBUMIN g/dL 2.8* 2.9* 3.4*   TOTAL BILIRUBIN mg/dL 0.18* 0.15* 0.28     Results from last 7 days   Lab Units 08/29/24  1200 08/29/24  1036 08/29/24  0943 08/29/24  0825 08/29/24  0731 08/29/24  0602 08/29/24  0601 08/28/24  1957 08/28/24  1202 08/28/24  0714 08/28/24  0128 08/28/24  0105   POC GLUCOSE mg/dl 97 193* 314* 479* 563* 529* 561* 268* 362* 387* 261* 254*     Results from last 7 days   Lab Units 08/29/24  0526 08/28/24  1132 08/27/24  0538 08/26/24  0451 08/25/24  0344 08/24/24  0951 08/23/24  0821   GLUCOSE RANDOM mg/dL 588* 380* 232* 179* 83 427* 430*               Results from last 7 days   Lab Units 08/27/24  0538 08/26/24  0451 08/25/24  0344 08/24/24  1740 08/23/24  0522   PROCALCITONIN ng/ml 0.58* 0.62* 1.44* 1.66* 3.98*     Results from last 7 days   Lab Units 08/27/24  1151   LACTIC ACID mmol/L 1.5             Medications:   Scheduled Medications:  ARIPiprazole, 2 mg, Per G Tube, HS  vitamin C, 1,000 mg, Per G Tube, Daily  baclofen, 10 mg, Per G Tube, TID  calcium carbonate, 750 mg, Oral, BID  carbidopa-levodopa, 2 tablet, Oral, BID  cholecalciferol, 1,000 Units, Per G Tube, Daily  citalopram, 40 mg, Per G Tube, Daily  docosanol, , Topical, 5x Daily  enoxaparin, 40 mg, Subcutaneous, Daily  Ferrous Sulfate, 300 mg, Oral, BID AC  insulin lispro, 2-12 Units, Subcutaneous, 4x Daily (AC & HS)  levothyroxine, 25 mcg, Per G Tube, QAM  LORazepam, 0.5 mg, Per G Tube, Daily  metFORMIN, 1,000 mg, Oral, BID With Meals  methenamine hippurate, 1 g, Oral, BID  omeprazole (PRILOSEC) suspension 2 mg/mL, 20 mg, Oral, Daily  oxybutynin, 5 mg, Per G Tube,  TID  polyethylene glycol, 17 g, Oral, Daily  pravastatin, 40 mg, Per G Tube, Daily With Dinner  traZODone, 100 mg, Per G Tube, HS  valACYclovir, 1,000 mg, Per G Tube, Q12H ROSEMARY    potassium chloride oral solution 20 mEq  Dose: 20 mEq  Freq: Once Route: PO  Start: 08/29/24 0730 End: 08/29/24 0836  insulin lispro (HumALOG/ADMELOG) 100 units/mL subcutaneous injection 1-5 Units  Dose: 1-5 Units  Freq: Every 6 hours Route: SC  Start: 08/27/24 2000 End: 08/28/24 0801    insulin lispro (HumALOG/ADMELOG) 100 units/mL subcutaneous injection 1-6 Units  Dose: 1-6 Units  Freq: Every 6 hours Route: SC  Start: 08/28/24 0815 End: 08/29/24 0619      Continuous IV Infusions:   insulin regular (HumuLIN R,NovoLIN R) 1 Units/mL in sodium chloride 0.9 % 100 mL infusion  Rate: 0.3-21 mL/hr Dose: 0.3-21 Units/hr  Freq: Titrated Route: IV  Last Dose: Stopped (08/29/24 1125)  Start: 08/29/24 0630 End: 08/29/24 1039  PRN Meds:  acetaminophen, 650 mg, Oral, Q4H PRN  ammonium lactate, , Topical, BID PRN  artificial tear, , Both Eyes, HS PRN  carbamide peroxide, 2 drop, Both Ears, BID PRN  dextromethorphan-guaiFENesin, 10 mL, Per G Tube, Q4H PRN  fluticasone, 1 spray, Nasal, Daily PRN  hydrocortisone, , Topical, 4x Daily PRN  Ketotifen Fumarate, 1 drop, Both Eyes, Daily PRN  Loratadine, 10 mg, Oral, Daily PRN  oxyCODONE, 2.5 mg, Per G Tube, Q4H PRN  oxyCODONE, 5 mg, Per G Tube, Q4H PRN        Discharge Plan: TBD    Network Utilization Review Department  ATTENTION: Please call with any questions or concerns to [phone] and carefully listen to the prompts so that you are directed to the right person. All voicemails are confidential.   For Discharge needs, contact Care Management DC Support Team at [phone] opt. [phone]  Send all requests for admission clinical reviews, approved or denied determinations and any other requests to dedicated fax number below belonging to the campus where the patient is receiving treatment. List of dedicated fax  numbers for the Facilities:  FACILITY NAME UR FAX NUMBER   ADMISSION DENIALS (Administrative/Medical Necessity) [phone]   DISCHARGE SUPPORT TEAM (NETWORK) [phone]   PARENT CHILD HEALTH (Maternity/NICU/Pediatrics) [phone]   [facility] [phone]   [facility] [phone]   [facility] [phone]   [facility] [phone]   [facility] [phone]   [facility] [phone]   [facility] [phone]   [facility] [phone]   [facility] [phone]   [facility] [phone]   [facility] [phone]   [facility] [phone]

## 2024-08-30 ENCOUNTER — APPOINTMENT (INPATIENT)
Dept: CT IMAGING | Facility: HOSPITAL | Age: 64
DRG: 391 | End: 2024-08-30
Payer: MEDICARE

## 2024-08-30 ENCOUNTER — APPOINTMENT (INPATIENT)
Dept: RADIOLOGY | Facility: HOSPITAL | Age: 64
DRG: 391 | End: 2024-08-30
Payer: MEDICARE

## 2024-08-30 LAB
ANION GAP SERPL CALCULATED.3IONS-SCNC: 3 MMOL/L (ref 4–13)
ANION GAP SERPL CALCULATED.3IONS-SCNC: 7 MMOL/L (ref 4–13)
ATRIAL RATE: 63 BPM
BASE EX.OXY STD BLDV CALC-SCNC: 93 % (ref 60–80)
BASE EXCESS BLDV CALC-SCNC: 4.4 MMOL/L
BASOPHILS # BLD AUTO: 0.03 THOUSANDS/ΜL (ref 0–0.1)
BASOPHILS NFR BLD AUTO: 0 % (ref 0–1)
BUN SERPL-MCNC: 17 MG/DL (ref 5–25)
BUN SERPL-MCNC: 19 MG/DL (ref 5–25)
CALCIUM SERPL-MCNC: 9.1 MG/DL (ref 8.4–10.2)
CALCIUM SERPL-MCNC: 9.3 MG/DL (ref 8.4–10.2)
CHLORIDE SERPL-SCNC: 100 MMOL/L (ref 96–108)
CHLORIDE SERPL-SCNC: 107 MMOL/L (ref 96–108)
CO2 SERPL-SCNC: 32 MMOL/L (ref 21–32)
CO2 SERPL-SCNC: 32 MMOL/L (ref 21–32)
CREAT SERPL-MCNC: 0.91 MG/DL (ref 0.6–1.3)
CREAT SERPL-MCNC: 0.94 MG/DL (ref 0.6–1.3)
EOSINOPHIL # BLD AUTO: 0.18 THOUSAND/ΜL (ref 0–0.61)
EOSINOPHIL NFR BLD AUTO: 2 % (ref 0–6)
ERYTHROCYTE [DISTWIDTH] IN BLOOD BY AUTOMATED COUNT: 13.5 % (ref 11.6–15.1)
GFR SERPL CREATININE-BSD FRML MDRD: 65 ML/MIN/1.73SQ M
GFR SERPL CREATININE-BSD FRML MDRD: 67 ML/MIN/1.73SQ M
GLUCOSE SERPL-MCNC: 102 MG/DL (ref 65–140)
GLUCOSE SERPL-MCNC: 119 MG/DL (ref 65–140)
GLUCOSE SERPL-MCNC: 135 MG/DL (ref 65–140)
GLUCOSE SERPL-MCNC: 140 MG/DL (ref 65–140)
GLUCOSE SERPL-MCNC: 188 MG/DL (ref 65–140)
GLUCOSE SERPL-MCNC: 237 MG/DL (ref 65–140)
GLUCOSE SERPL-MCNC: 270 MG/DL (ref 65–140)
GLUCOSE SERPL-MCNC: 385 MG/DL (ref 65–140)
GLUCOSE SERPL-MCNC: 391 MG/DL (ref 65–140)
GLUCOSE SERPL-MCNC: 485 MG/DL (ref 65–140)
GLUCOSE SERPL-MCNC: 490 MG/DL (ref 65–140)
GLUCOSE SERPL-MCNC: 555 MG/DL (ref 65–140)
HCO3 BLDV-SCNC: 30.1 MMOL/L (ref 24–30)
HCT VFR BLD AUTO: 29.3 % (ref 34.8–46.1)
HGB BLD-MCNC: 9 G/DL (ref 11.5–15.4)
IMM GRANULOCYTES # BLD AUTO: 0.03 THOUSAND/UL (ref 0–0.2)
IMM GRANULOCYTES NFR BLD AUTO: 0 % (ref 0–2)
LACTATE SERPL-SCNC: 2.2 MMOL/L (ref 0.5–2)
LACTATE SERPL-SCNC: 2.8 MMOL/L (ref 0.5–2)
LYMPHOCYTES # BLD AUTO: 1.32 THOUSANDS/ΜL (ref 0.6–4.47)
LYMPHOCYTES NFR BLD AUTO: 18 % (ref 14–44)
MAGNESIUM SERPL-MCNC: 1.7 MG/DL (ref 1.9–2.7)
MCH RBC QN AUTO: 32.4 PG (ref 26.8–34.3)
MCHC RBC AUTO-ENTMCNC: 30.7 G/DL (ref 31.4–37.4)
MCV RBC AUTO: 105 FL (ref 82–98)
MONOCYTES # BLD AUTO: 0.57 THOUSAND/ΜL (ref 0.17–1.22)
MONOCYTES NFR BLD AUTO: 8 % (ref 4–12)
NEUTROPHILS # BLD AUTO: 5.23 THOUSANDS/ΜL (ref 1.85–7.62)
NEUTS SEG NFR BLD AUTO: 72 % (ref 43–75)
NRBC BLD AUTO-RTO: 0 /100 WBCS
O2 CT BLDV-SCNC: 12.3 ML/DL
P AXIS: 77 DEGREES
PCO2 BLDV: 51.2 MM HG (ref 42–50)
PH BLDV: 7.39 [PH] (ref 7.3–7.4)
PHOSPHATE SERPL-MCNC: 3 MG/DL (ref 2.3–4.1)
PLATELET # BLD AUTO: 217 THOUSANDS/UL (ref 149–390)
PMV BLD AUTO: 11.2 FL (ref 8.9–12.7)
PO2 BLDV: 82.9 MM HG (ref 35–45)
POTASSIUM SERPL-SCNC: 4.6 MMOL/L (ref 3.5–5.3)
POTASSIUM SERPL-SCNC: 4.7 MMOL/L (ref 3.5–5.3)
PR INTERVAL: 146 MS
PROCALCITONIN SERPL-MCNC: 0.22 NG/ML
QRS AXIS: 91 DEGREES
QRSD INTERVAL: 76 MS
QT INTERVAL: 424 MS
QTC INTERVAL: 433 MS
RBC # BLD AUTO: 2.78 MILLION/UL (ref 3.81–5.12)
SODIUM SERPL-SCNC: 139 MMOL/L (ref 135–147)
SODIUM SERPL-SCNC: 142 MMOL/L (ref 135–147)
T WAVE AXIS: 110 DEGREES
TSH SERPL DL<=0.05 MIU/L-ACNC: 1.27 UIU/ML (ref 0.45–4.5)
VENTRICULAR RATE: 63 BPM
WBC # BLD AUTO: 7.36 THOUSAND/UL (ref 4.31–10.16)

## 2024-08-30 PROCEDURE — 71045 X-RAY EXAM CHEST 1 VIEW: CPT

## 2024-08-30 PROCEDURE — 87081 CULTURE SCREEN ONLY: CPT

## 2024-08-30 PROCEDURE — 80048 BASIC METABOLIC PNL TOTAL CA: CPT

## 2024-08-30 PROCEDURE — 83605 ASSAY OF LACTIC ACID: CPT

## 2024-08-30 PROCEDURE — 82948 REAGENT STRIP/BLOOD GLUCOSE: CPT

## 2024-08-30 PROCEDURE — NC001 PR NO CHARGE: Performed by: STUDENT IN AN ORGANIZED HEALTH CARE EDUCATION/TRAINING PROGRAM

## 2024-08-30 PROCEDURE — 74018 RADEX ABDOMEN 1 VIEW: CPT

## 2024-08-30 PROCEDURE — 83735 ASSAY OF MAGNESIUM: CPT

## 2024-08-30 PROCEDURE — 93005 ELECTROCARDIOGRAM TRACING: CPT

## 2024-08-30 PROCEDURE — 84443 ASSAY THYROID STIM HORMONE: CPT

## 2024-08-30 PROCEDURE — 82805 BLOOD GASES W/O2 SATURATION: CPT

## 2024-08-30 PROCEDURE — 84145 PROCALCITONIN (PCT): CPT

## 2024-08-30 PROCEDURE — 93010 ELECTROCARDIOGRAM REPORT: CPT | Performed by: INTERNAL MEDICINE

## 2024-08-30 PROCEDURE — 87040 BLOOD CULTURE FOR BACTERIA: CPT

## 2024-08-30 PROCEDURE — 99291 CRITICAL CARE FIRST HOUR: CPT | Performed by: STUDENT IN AN ORGANIZED HEALTH CARE EDUCATION/TRAINING PROGRAM

## 2024-08-30 PROCEDURE — 74176 CT ABD & PELVIS W/O CONTRAST: CPT

## 2024-08-30 PROCEDURE — 85025 COMPLETE CBC W/AUTO DIFF WBC: CPT

## 2024-08-30 PROCEDURE — 84100 ASSAY OF PHOSPHORUS: CPT

## 2024-08-30 RX ORDER — ALBUMIN, HUMAN INJ 5% 5 %
25 SOLUTION INTRAVENOUS ONCE
Status: COMPLETED | OUTPATIENT
Start: 2024-08-30 | End: 2024-08-30

## 2024-08-30 RX ORDER — MAGNESIUM SULFATE HEPTAHYDRATE 40 MG/ML
2 INJECTION, SOLUTION INTRAVENOUS ONCE
Status: COMPLETED | OUTPATIENT
Start: 2024-08-30 | End: 2024-08-30

## 2024-08-30 RX ORDER — VANCOMYCIN HYDROCHLORIDE 1 G/200ML
1000 INJECTION, SOLUTION INTRAVENOUS EVERY 24 HOURS
Status: DISCONTINUED | OUTPATIENT
Start: 2024-08-30 | End: 2024-08-30

## 2024-08-30 RX ORDER — VANCOMYCIN HYDROCHLORIDE 1 G/200ML
25 INJECTION, SOLUTION INTRAVENOUS ONCE
Status: COMPLETED | OUTPATIENT
Start: 2024-08-30 | End: 2024-08-30

## 2024-08-30 RX ORDER — VANCOMYCIN HYDROCHLORIDE 750 MG/150ML
15 INJECTION, SOLUTION INTRAVENOUS EVERY 12 HOURS
Status: DISCONTINUED | OUTPATIENT
Start: 2024-08-30 | End: 2024-08-30 | Stop reason: DRUGHIGH

## 2024-08-30 RX ORDER — POTASSIUM CHLORIDE 20MEQ/15ML
20 LIQUID (ML) ORAL ONCE
Status: COMPLETED | OUTPATIENT
Start: 2024-08-30 | End: 2024-08-30

## 2024-08-30 RX ORDER — INSULIN LISPRO 100 [IU]/ML
1-6 INJECTION, SOLUTION INTRAVENOUS; SUBCUTANEOUS EVERY 6 HOURS SCHEDULED
Status: DISCONTINUED | OUTPATIENT
Start: 2024-08-30 | End: 2024-09-11

## 2024-08-30 RX ORDER — LORAZEPAM 0.5 MG/1
0.5 TABLET ORAL
Status: DISCONTINUED | OUTPATIENT
Start: 2024-08-30 | End: 2024-09-19 | Stop reason: HOSPADM

## 2024-08-30 RX ORDER — CARBIDOPA AND LEVODOPA 25; 100 MG/1; MG/1
1 TABLET ORAL 4 TIMES DAILY
Status: DISCONTINUED | OUTPATIENT
Start: 2024-08-30 | End: 2024-09-19 | Stop reason: HOSPADM

## 2024-08-30 RX ADMIN — DOCOSANOL: 100 CREAM TOPICAL at 17:07

## 2024-08-30 RX ADMIN — OXYBUTYNIN CHLORIDE 5 MG: 5 TABLET ORAL at 16:17

## 2024-08-30 RX ADMIN — ALBUMIN (HUMAN) 25 G: 12.5 INJECTION, SOLUTION INTRAVENOUS at 05:34

## 2024-08-30 RX ADMIN — PRAVASTATIN SODIUM 40 MG: 40 TABLET ORAL at 16:46

## 2024-08-30 RX ADMIN — CARBIDOPA AND LEVODOPA 1 TABLET: 25; 100 TABLET ORAL at 21:00

## 2024-08-30 RX ADMIN — SODIUM CHLORIDE 2000 ML: 0.9 INJECTION, SOLUTION INTRAVENOUS at 01:46

## 2024-08-30 RX ADMIN — ACETAMINOPHEN 650 MG: 650 SUSPENSION ORAL at 20:58

## 2024-08-30 RX ADMIN — BACLOFEN 10 MG: 10 TABLET ORAL at 16:46

## 2024-08-30 RX ADMIN — METHENAMINE HIPPURATE 1 G: 1 TABLET ORAL at 18:01

## 2024-08-30 RX ADMIN — Medication 20 MEQ: at 01:23

## 2024-08-30 RX ADMIN — DOCOSANOL: 100 CREAM TOPICAL at 21:01

## 2024-08-30 RX ADMIN — FERROUS SULFATE 300 MG: 300 SOLUTION ORAL at 16:17

## 2024-08-30 RX ADMIN — DOCOSANOL: 100 CREAM TOPICAL at 16:17

## 2024-08-30 RX ADMIN — NOREPINEPHRINE BITARTRATE 2 MCG/MIN: 1 INJECTION INTRAVENOUS at 05:26

## 2024-08-30 RX ADMIN — DOCOSANOL: 100 CREAM TOPICAL at 08:27

## 2024-08-30 RX ADMIN — OXYBUTYNIN CHLORIDE 5 MG: 5 TABLET ORAL at 20:58

## 2024-08-30 RX ADMIN — BACLOFEN 10 MG: 10 TABLET ORAL at 20:58

## 2024-08-30 RX ADMIN — CARBIDOPA AND LEVODOPA 1 TABLET: 25; 100 TABLET ORAL at 11:33

## 2024-08-30 RX ADMIN — CARBIDOPA AND LEVODOPA 1 TABLET: 25; 100 TABLET ORAL at 17:03

## 2024-08-30 RX ADMIN — LORAZEPAM 0.5 MG: 0.5 TABLET ORAL at 21:00

## 2024-08-30 RX ADMIN — DOCOSANOL: 100 CREAM TOPICAL at 11:32

## 2024-08-30 RX ADMIN — INSULIN LISPRO 3 UNITS: 100 INJECTION, SOLUTION INTRAVENOUS; SUBCUTANEOUS at 17:00

## 2024-08-30 RX ADMIN — VANCOMYCIN HYDROCHLORIDE 1000 MG: 1 INJECTION, SOLUTION INTRAVENOUS at 06:29

## 2024-08-30 RX ADMIN — SODIUM CHLORIDE 10 UNITS/HR: 9 INJECTION, SOLUTION INTRAVENOUS at 01:01

## 2024-08-30 RX ADMIN — CEFEPIME 2000 MG: 2 INJECTION, POWDER, FOR SOLUTION INTRAVENOUS at 06:04

## 2024-08-30 RX ADMIN — MAGNESIUM SULFATE HEPTAHYDRATE 2 G: 40 INJECTION, SOLUTION INTRAVENOUS at 06:34

## 2024-08-30 RX ADMIN — ARIPIPRAZOLE 2 MG: 2 TABLET ORAL at 21:31

## 2024-08-30 RX ADMIN — ENOXAPARIN SODIUM 40 MG: 40 INJECTION SUBCUTANEOUS at 08:06

## 2024-08-30 RX ADMIN — CALCIUM CARBONATE (ANTACID) CHEW TAB 500 MG 750 MG: 500 CHEW TAB at 20:59

## 2024-08-30 RX ADMIN — INSULIN LISPRO 12 UNITS: 100 INJECTION, SOLUTION INTRAVENOUS; SUBCUTANEOUS at 00:14

## 2024-08-30 NOTE — PROGRESS NOTES
Critical Care Interval Transfer Note:    Brief Hospital Summary:     64-year-old female past medical history of cerebral palsy, intellectual disability nonverbal at baseline, DM, dysphagia now status post open gastrostomy tube placement postop day 3, recent admissions for sepsis was admitted to the ICU this morning due to hypotension that persisted despite 2 L IV fluid bolus. She was also given albumin 25 g and started on levophed. She was discharged on 8/23 from a hospitalization for aspiration pneumonia and UTI for which she received antibiotic therapy. Her pressures improved later during the morning on 8/30 even with weaning off of Levophed.  She is now off of vasopressor therapy with MAPs in the 70s to high 80's. It appears that her blood pressure runs low in the outpatient setting per chart review.  Of note, when looking at the chart she received her normal medication regimen in the evening however she did receive a dose of oxycodone 5 mg around 8 PM (which she did not receive recently-only one other dose on 8/26) which could have contributed to her drop in blood pressure along with her other sedating medications.  Regardless, we pursued workup to evaluate further etiologies for her hypotension. Bedside POCUS of IVC was around 2, and non collapsible.  Her white blood cell count was not elevated, lactic acid this morning 2.2.  Blood cultures were drawn and patient was given 1 dose of cefepime and vancomycin.  Chest x-ray was obtained which showed no cardiopulmonary disease, there was a concern this morning to rule out abdominal pathology so a KUB x-ray was performed which showed nonobstructive bowel gas pattern a large volume of stool.  There was also a question whether pneumoperitoneum was seen so CT abdomen pelvis was obtained which showed findings compatible with open placement and no definite perforation.  She has already been treated for her UTI and there are no concerns for ongoing infection at this time  so antibiotics were discontinued.  Can be reevaluated if fever or other signs of infection are noted.    In addition, her glucose levels have improved while tube feeds were held so her insulin drip was discontinued and replaced with sliding scale insulin.  Reached out to nutrition regarding tube feeds to see if Glucerna would be a better option given the hyperglycemia and history of diabetes.  Nutrition recommended trial cyclic feeds of Glucerna 1.2 @ 80 ml/hr X 12 hrs (Run 6 pm-6am). This provides 960 ml total volume, 1152 kcals, 58 g protein, and 109 g CHO. If blood sugars remain elevated on cyclic feeds, switch to continuous feeds of Glucerna 1.2 @ 40 ml/hr X 24 hrs to see if lower volume/more time improves blood sugars.      Will restart her back on her home medications with the exception of discontinuing Ativan. Would recommend caution with opioids in the setting of her other psych and sedating medications.  On 8/30 patient was hemodynamically stable and cleared to be transferred out of the ICU. Called POA to let him know of the transfer out of the ICU.     Barriers to discharge:   Monitor on tube feeding for any electrolyte or metabolic abnormalities  Care of gastrostomy tube     Consults: IP CONSULT TO ACUTE CARE SURGERY  IP CONSULT TO NUTRITION SERVICES  IP CONSULT TO PHARMACY  IP CONSULT TO NUTRITION SERVICES    Recommended to review admission imaging for incidental findings and document in discharge navigator: Chart reviewed, no known incidental findings noted at this time.     Incidental finding on CT chest abdomen pelvis 8/24 (not done during her ICU admission-just noticed per chart review): Focus of hyperenhancement along the anterior lower uterine segment. Differential diagnosis would include a cervical mass or fibroid. Correlation with pelvic ultrasound or MRI may be considered.      Discharge Plan: Anticipate discharge in 24-48 hrs to group home.          Patient seen and evaluated by Critical Care  today and deemed to be appropriate for transfer to Med Surg. Spoke to Dr. Young from [facility] to accept transfer. Critical care can be contacted via Secure Chat with any questions or concerns.

## 2024-08-30 NOTE — QUICK NOTE
Progress Note - Triage Asssessment   Terrie Alicea 64 y.o. female MRN: [id number]    Time Called ( Time): 0021  Date Called: 08/30/24  Room#: S411  Person requesting evaluation: Reg Canada MD    Situation:    Low BP reading     Interventions:   Obtain IV access with critical care nursing going to aid, IV fluid bolus, and stat labs         Triage Assessment:     63 yo F with PMHx of cerebral palsy, dysphagia, non verbal at baseline with low BP reading, prompting SLIM team to reach out to crit care. Pt was also noted to have IV issues. Upon evaluation, patient is non verbal, MAP of 53, satting 97% on RA, contracture of upper extremities, otherwise no other acute findings on exam. Nursing at bedside in process of obtaining new IV access.     Recommendations discussed with SLIM. Obtain IV access with critical care nursing going to help, continue with IV fluid bolus with BP monitoring, and f/u stat labs. Critical care is available if pt remains hypotensive.       Continue to monitor BP in medsurg while pending labs and IV fluids

## 2024-08-30 NOTE — QUICK NOTE
64 year old female with cerebral palsy POD4 open gastrostomy tube placement with transfer to ICU secondary to hypotension.    On exam, abdomen is soft, nondistended, patient doesn't react to palpation.    Incision looks well, no erythema, warmth, drainage.    CTAP obtained by ICU and reviewed by chief surgical resident - expected free air noted for recent procedure, will await final read.

## 2024-08-30 NOTE — ASSESSMENT & PLAN NOTE
Previous culture 8/20-grew pansensitive E. coli  CT abdomen pelvis demonstrates wall thickening of urinary bladder  Not currently on abx, received 3 doses of Ceftraxone    Plan:  Start Cefepime and Vanc  UA and urine culture  30 cc/kg fluids

## 2024-08-30 NOTE — ASSESSMENT & PLAN NOTE
Lab Results   Component Value Date    HGBA1C 6.2 (H) 08/13/2024       Recent Labs     08/30/24  0104 08/30/24  0208 08/30/24  0301 08/30/24  0416   POCGLU 490* 485* 391* 270*       Blood Sugar Average: Last 72 hrs:  (P) [id number]    Pt has been on insulin drip, monitoring K, and q1h BG checks.    Continue insulin drip, Q2H BG checks

## 2024-08-30 NOTE — ASSESSMENT & PLAN NOTE
Previously on Abillify 2 mg, Citalopram 40 mg, and Trazodone 100 mg.    Continue Citalopram and Aripiprazole.  D/C Trazodone, Ativan

## 2024-08-30 NOTE — QUICK NOTE
65 y/o female h/o Cerebral palsy/failure to thrive known to your service for recent g-tube placement (8/26). She was transferred at 5:30 this am to ICU for hypotension despite volume rescucitation. CXR/kub shown gaseous distention in bowel. Stat CT abd performed this am  showing presence of free air.I see free air however has not been formally read.  Not sure if this is post procedural g-tube vs pathological. She is tender on exam however abd soft. Attempted to place tube to gravity but unable to find an adapter that fits.     Notified surgery that was previously on for g-tube placement and requested them to evaluate patient and review images.       Sepsis workup in place. On going resuscitation in process. Will notify family of transfer to the ICU.

## 2024-08-30 NOTE — CONSULTS
[facility]  Consult  Name: Terrie Alicea 64 y.o. female I MRN: [id number]  Unit/Bed#: ICU 09 I Date of Admission: 8/24/2024   Date of Service: 8/30/2024 I Hospital Day: 6    Consults    Assessment & Plan   Hypotension (SIRS vs Sepsis)  Assessment & Plan  MAP 54 with 2L IV fluids, prompting critical care evaluation.  Infectious vs medication side effect  No WBC and does not meet sepsis criteria  Lactic acid 2.8  Was previously admitted with aspiration PNA and recurrent UTI  Urine cx on 8/20 grew pan sensitive E. Coli.  Currently not on abx  Bedside POCUS, IVC ~2, non collapsible    Plan:  30 cc/kg IV fluids  Levophed started, Maintain MAP > 65  Ordered CXR (pending)  Blood cx, UA/urine culture, and MRSA culture ordered  D/c Metformin and other sedating meds  Monitor BP    Mood disturbances  Assessment & Plan  Previously on Abillify 2 mg, Citalopram 40 mg, and Trazodone 100 mg.    Continue Citalopram and Aripiprazole.  D/C Trazodone, Ativan    Parkinson's disease  Assessment & Plan  Prior history and was previously on neuroleptics  On Sinemet    Plan:  Continue Sinemet  BID    Incontinence  Assessment & Plan  Continue Oxybutynin 5 mg TID    DM (diabetes mellitus) ([facility])  Assessment & Plan  Lab Results   Component Value Date    HGBA1C 6.2 (H) 08/13/2024       Recent Labs     08/30/24  0104 08/30/24  0208 08/30/24  0301 08/30/24  0416   POCGLU 490* 485* 391* 270*       Blood Sugar Average: Last 72 hrs:  (P) [id number]    Pt has been on insulin drip, monitoring K, and q1h BG checks.    Continue insulin drip, Q2H BG checks    UTI (urinary tract infection)  Assessment & Plan  Previous culture 8/20-grew pansensitive E. coli  CT abdomen pelvis demonstrates wall thickening of urinary bladder  Not currently on abx, received 3 doses of Ceftraxone    Plan:  Start Cefepime and Vanc  UA and urine culture  30 cc/kg fluids      Cerebral palsy (HCC)  Assessment & Plan  Continue Baclofen 10  mg TID    * Dysphagia  Assessment & Plan  Pt is POD2 s/p open gastrostomy tube placement and is currently on trickle feeds from 6 pm to 6 am.    Plan:   Continue Jevity 1.5 per nutrition           History of Present Illness     HPI: Terrie Alicea is a 64 y.o. with PMHx of cerebral palsy, cognitive impairment initially presented on 8/24 for fever and AMS. History limited due to patient's nonverbal baseline. Critical care team was contacted due to patient's persistent hypotension despite 2L IV fluids, MAP 54. On initial evaluation, patient is non verbal, awake, unable to follow commands, contractures to extremities, continues to be hypotensive. Pt was brought to the ICU, started on Levophed, 30 cc/kg IV fluids continued, CXR pending, UA, blood/urine/MRSA cultures drawn, EKG, bedside POCUS done.    History obtained from chart review and unobtainable from patient due to mental status.      Review of Systems: See HPI for Review of Systems  Disposition: Stepdown Level 1   Historical Information   Past Medical History:  12/11/2015: Acute metabolic encephalopathy  No date: Adjustment disorder  12/11/2015: Altered mental status  No date: Anemia  No date: Bipolar 1 disorder ([facility])  No date: Cerebral palsy (HCC)  02/06/2016: Chronic hypernatremia  01/19/2016: Closed fracture of left hip ([facility])  No date: Closed left hip fracture ([facility])      Comment:  no surgery  No date: Constipation  02/20/2016: Dehydration  No date: Developmental delay  No date: Diabetes mellitus (HCC)  No date: Difficulty walking  No date: Disease of thyroid gland  No date: Diverticulosis  No date: Dysphagia      Comment:  Worsening  No date: Fracture of multiple ribs of right side  06/02/2021: Herpes zoster without complication  07/26/2015: Hip fracture ([facility])      Comment:  left  No date: Hyperlipidemia  12/28/2018: Hypernatremia  No date: Hypotension  No date: Impulse control disorder  No date: Incontinence  No date: Intellectual disability due to  developmental disorder,   unspecified  No date: Microalbuminuria  No date: Neuropathy in diabetes ([facility])  No date: Osteopathia  No date: Osteoporosis  No date: Peripheral neuropathy  No date: 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[facility])  07/31/2015: Thrombocytopenia ([facility]) Past Surgical History:  No date: ABDOMINAL SURGERY  No date: COLECTOMY MIN      Comment:  re-anastomosis 7/22/16 1/31/16: COLON SURGERY  07/21/2016: COLONOSCOPY; N/A      Comment:  Procedure: COLONOSCOPY;  Surgeon: Rosalino Jacome MD;               Location: [facility];  Service:   01/31/2016: COLONOSCOPY; N/A      Comment:  Procedure: COLONOSCOPY;  Surgeon: Rosalino Jacome MD;               Location: [facility];  Service:   07/25/2017: COLONOSCOPY; N/A      Comment:  Procedure: COLONOSCOPY;  Surgeon: Rosalino Jacome MD;               [facility];  Service: Colorectal  No date: COLOSTOMY  01/31/2016: EXPLORATORY LAPAROTOMY W/ BOWEL RESECTION; N/A      Comment:  Procedure: exploratory laparotomy, left sigmoidectomy,                coloproctostomy, take down splenic flexure, loop                colostomy;  Surgeon: Rosalino Jacome MD;  Location: [facility];  Service:   8/26/2024: GASTROSTOMY TUBE PLACEMENT; N/A      Comment:  Procedure: INSERTION GASTROSTOMY TUBE OPEN;  Surgeon:                Monroe Jean DO;  Location: [facility];                 Service: General  07/22/2016: UT CLOSURE ENTEROSTOMY LG/SMALL INTESTINE; N/A      Comment:  Procedure: SEGMENTAL COLECTOMY WITH COLOCOLOSTOMY;                 Surgeon: Rosalino Jacome MD;  Location: [facility];                 Service: Colorectal  No date: UPPER GASTROINTESTINAL ENDOSCOPY   Current Outpatient Medications   Medication Instructions    ARIPiprazole (ABILIFY) 2 mg tablet 1 tablet, Per G Tube, Daily at bedtime    Ascorbic Acid (vitamin C) 1000 MG tablet Crush 1 tablet daily    baclofen 10 mg, Per G Tube, 3 times daily    benzonatate (TESSALON PERLES) 100 mg capsule TAKE  1 CAPSULE BY MOUTH EVERY 8 HOURS AS NEEDED FOR COUGH **DO NOT CRUSH**    calcium carbonate (OS-MORRIS) 600 mg, Per G Tube, 2 times daily with meals    carbamide peroxide (DEBROX) 6.5 % otic solution 2 drops, 2 drops in the affected ear prn x 5 days    carbidopa-levodopa (SINEMET CR)  mg TBCR per ER tablet TAKE 2 TABLETS BY MOUTH (50/200MG) TWICE A DAY (PARKINSONS DISEASE)    cholecalciferol (VITAMIN D3) 1,000 Units, Per G Tube, Daily    citalopram (CeleXA) 40 mg tablet 1 tablet, Per G Tube, Daily    docosanol (ABREVA) 10 % Topical, 5 times daily, Apply and rub in 5x a day until healed as needed for lesion    ferrous sulfate 324 (65 Fe) mg 1 tablet, Oral, 2 times daily    fexofenadine (ALLEGRA) 180 mg, Per G Tube, Daily PRN    fluticasone (FLONASE) 50 mcg/act nasal spray 1 spray, Nasal, Daily PRN    glimepiride (AMARYL) 1 mg, Per G Tube, Daily with breakfast    guaiFENesin (DIABETIC TUSSIN EX) 200 mg, Per G Tube, 4 times daily PRN    hydrocortisone 1 % cream Apply topically to affected area twice daily as needed for rash    levothyroxine 25 mcg, Per G Tube, Every morning, TAKE 1 TABLET BY MOUTH EVERY MORNING (HYPOTHYROIDISM)    LORazepam (ATIVAN) 0.5 mg, Oral, Daily, Every day at 8pm    metFORMIN (GLUCOPHAGE) 1,000 mg, Per G Tube, 2 times daily with meals    methenamine hippurate (HIPREX) 1 g tablet Crush 1 tablet two times a day with meals    neomycin-bacitracin-polymyxin b (NEOSPORIN) ointment 1 application., Topical, 2 times daily PRN, Apply to affected area BID PRN    olopatadine HCl (PATADAY) 0.2 % opth drops 1 drop, Both Eyes, As needed, Instill 1 drop into affected eyes daily PRN for eye redness    omeprazole 20 mg, Per G Tube, 2 times daily before meals    oxybutynin (DITROPAN) 5 mg, Per G Tube, 3 times daily    polyethylene glycol (GLYCOLAX) 17 g, Per G Tube, Daily    Prolia 60 mg, Subcutaneous, Once, To be administered on 7/2/24    Refresh Liquigel 1 % GEL 2 times daily, 1 drop Bid bilaterally      simvastatin (ZOCOR) 20 mg, Oral, Daily at bedtime    Starch, Thickening, POWD Oral, Daily, Use on food and liquid as directed    traZODone (DESYREL) 100 mg, Per G Tube, Daily at bedtime, Take 1 tablet (100 mg total) by mouth daily at  8 pm for depression    valACYclovir (VALTREX) 1,000 mg, Oral, Every 12 hours scheduled    Vitamins A & D (VITAMIN A & D) ointment Topical, As needed    Allergies   Allergen Reactions    Ingrezza [Valbenazine Tosylate] Other (See Comments)     Behavioral changes per caregiver    Keflex [Cephalexin] GI Intolerance      Social History     Tobacco Use    Smoking status: Never    Smokeless tobacco: Never   Vaping Use    Vaping status: Never Used   Substance Use Topics    Alcohol use: Not Currently    Drug use: No    Family History   Problem Relation Age of Onset    Alcohol abuse Mother     Alcohol abuse Father     Diabetes Sister     No Known Problems Sister     No Known Problems Sister         Objective                            Vitals I/O      Most Recent Min/Max in 24hrs   Temp 98 °F (36.7 °C) Temp  Min: 98 °F (36.7 °C)  Max: 99.5 °F (37.5 °C)   Pulse 62 Pulse  Min: 59  Max: 79   Resp 20 Resp  Min: 16  Max: 24   /63 BP  Min: 76/38  Max: 122/56   O2 Sat 100 % SpO2  Min: 93 %  Max: 100 %      Intake/Output Summary (Last 24 hours) at 8/30/2024 0651  Last data filed at 8/30/2024 0144  Gross per 24 hour   Intake 335 ml   Output 792 ml   Net -457 ml       Diet NPO    Invasive Monitoring           Physical Exam   Physical Exam  Vitals and nursing note reviewed.   Eyes:      General:         Right eye: No discharge.         Left eye: No discharge.      Extraocular Movements: Extraocular movements intact.      Conjunctiva/sclera: Conjunctivae normal.   Skin:     General: Skin is cool and dry.      Capillary Refill: Capillary refill takes less than 2 seconds.   HENT:      Head: Normocephalic and atraumatic.      Nose: No congestion or rhinorrhea.      Mouth/Throat:      Mouth: Mucous  membranes are dry.      Pharynx: No oropharyngeal exudate.   Cardiovascular:      Rate and Rhythm: Normal rate and regular rhythm.      Pulses: Normal pulses.      Heart sounds: Normal heart sounds.   Musculoskeletal:      Right lower leg: No edema.      Left lower leg: No edema.      Comments: Contractures of extremities   Abdominal: General: Bowel sounds are normal. There is abdominal type feeding tube.There is no distension.      Palpations: Abdomen is soft.   Constitutional:       General: She is not in acute distress.     Appearance: She is ill-appearing (chronically).   Pulmonary:      Effort: Pulmonary effort is normal. No accessory muscle usage or accessory muscle usage.      Breath sounds: No wheezing.      Comments: Coarse breath sounds bilaterally, improved with cough  Neurological:      Mental Status: Mental status is at baseline.      Comments: Awake, eyes open, no tracking, withdraws from pain. Nonverbal at baseline.            Diagnostic Studies      EKG: reviewed, NSR, rate 63, borderline R axis, nonspecific ST and T-wave changes with T-wave flattening.  Imaging: CT chest/abd/pelvis on 8/24 reviewed. CXR 8/30 pending. I have personally reviewed pertinent reports.       Medications:  Scheduled PRN   ARIPiprazole, 2 mg, HS  vitamin C, 1,000 mg, Daily  baclofen, 10 mg, TID  calcium carbonate, 750 mg, BID  carbidopa-levodopa, 2 tablet, BID  cefepime, 2,000 mg, Q12H  cholecalciferol, 1,000 Units, Daily  citalopram, 40 mg, Daily  docosanol, , 5x Daily  enoxaparin, 40 mg, Daily  Ferrous Sulfate, 300 mg, BID AC  levothyroxine, 25 mcg, QAM  magnesium sulfate, 2 g, Once  methenamine hippurate, 1 g, BID  omeprazole (PRILOSEC) suspension 2 mg/mL, 20 mg, Daily  oxybutynin, 5 mg, TID  polyethylene glycol, 17 g, Daily  pravastatin, 40 mg, Daily With Dinner  valACYclovir, 1,000 mg, Q12H ROSEMARY  vancomycin, 25 mg/kg (Adjusted), Once      acetaminophen, 650 mg, Q4H PRN  ammonium lactate, , BID PRN  artificial tear, , HS  PRN  carbamide peroxide, 2 drop, BID PRN  dextromethorphan-guaiFENesin, 10 mL, Q4H PRN  fluticasone, 1 spray, Daily PRN  hydrocortisone, , 4x Daily PRN  Ketotifen Fumarate, 1 drop, Daily PRN  Loratadine, 10 mg, Daily PRN  oxyCODONE, 2.5 mg, Q4H PRN  oxyCODONE, 5 mg, Q4H PRN       Continuous    insulin regular (HumuLIN R,NovoLIN R) 1 Units/mL in sodium chloride 0.9 % 100 mL infusion, 0.3-21 Units/hr, Last Rate: 4 Units/hr (08/30/24 0614)  norepinephrine, 1-30 mcg/min, Last Rate: 2 mcg/min (08/30/24 0526)         Labs:    CBC    Recent Labs     08/29/24  0526 08/30/24  0345   WBC 5.58 7.36   HGB 9.3* 9.0*   HCT 29.0* 29.3*    217     BMP    Recent Labs     08/30/24  0004 08/30/24  0345   SODIUM 139 142   K 4.7 4.6    107   CO2 32 32   AGAP 7 3*   BUN 17 19   CREATININE 0.91 0.94   CALCIUM 9.3 9.1       Coags    No recent results     Additional Electrolytes  Recent Labs     08/29/24  0526 08/30/24  0345   MG 2.2 1.7*   PHOS 4.7* 3.0          Blood Gas    No recent results  Recent Labs     08/30/24  0345   PHVEN 7.387   BBM0KML 51.2*   PO2VEN 82.9*   KZQ1WYL 30.1*   BEVEN 4.4   R9CPLOP 93.0*    LFTs  Recent Labs     08/28/24  1132 08/29/24  0526   ALT 5* 5*   AST 4* 5*   ALKPHOS 56 57   ALB 2.9* 2.8*   TBILI 0.15* 0.18*       Infectious  Recent Labs     08/30/24  0345   PROCALCITONI 0.22     Glucose  Recent Labs     08/28/24  1132 08/29/24  0526 08/30/24  0004 08/30/24  0345   GLUC 380* 588* 555* 385*               Jenna Maynard MD

## 2024-08-30 NOTE — CONSULTS
Recommend switching to Glucerna formula as pt with elevated blood sugars despite medication management.     Per group home staff, pt needs to be on nightly feeds from 6pm-6am given staff not available to provide bolus feeds and inability to run feeds while pt is at .    Recommend trial cyclic feeds of Glucerna 1.2 @ 80 ml/hr X 12 hrs (Run 6 pm-6am). This provides 960 ml total volume, 1152 kcals, 58 g protein, and 109 g CHO. If blood sugars remain elevated on cyclic feeds, switch to continuous feeds of Glucerna 1.2 @ 40 ml/hr X 24 hrs to see if lower volume/more time improves blood sugars. Re-consult RD as needed.

## 2024-08-30 NOTE — ASSESSMENT & PLAN NOTE
Pt is POD2 s/p open gastrostomy tube placement and is currently on trickle feeds from 6 pm to 6 am.    Plan:   Continue Jevity 1.5 per nutrition

## 2024-08-30 NOTE — CASE MANAGEMENT
Case Management Discharge Planning Note    Patient name Terrie Alicea  Location S /S -01 MRN [id number]  : 1960 Date 2024       Current Admission Date: 2024  Current Admission Diagnosis:Dysphagia   Patient Active Problem List    Diagnosis Date Noted Date Diagnosed    Mood disturbances 2024     Severe protein-calorie malnutrition (HCC) 2024     Functional quadriplegia ([facility]) 2024     Protein-calorie malnutrition, unspecified severity ([facility]) 2024     Hospital discharge follow-up 2024     SIRS (systemic inflammatory response syndrome) ([facility]) 2024     Developmental delay      Preop examination 2023     Cataract 2023     Internal hemorrhoids 2023     Contracture of right hand 2023     Need for shingles vaccine 2023     Viral illness 2022     Dystonic cerebral palsy ([facility]) 2022     Cervical dystonia 2022     Parkinson's disease 2022     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[facility]) 10/18/2021     Polyneuropathy associated with underlying disease ([facility]) 10/18/2021     Hypercalcemia 2021     Herpes zoster without complication 2021     Abnormal finding on lung imaging 2021     Anemia 2020     Eosinophilic leukocytosis 2020     Underweight 2020     Dysphagia 2020     History of vitamin D deficiency 2020     History of fall 10/28/2019     Breast asymmetry 2019     Hypernatremia 2018     Severe sepsis ([facility]) 2018     Gait disturbance 2018     Osteoarthritis of both hips 2018     Severe Intellectual disability 2018     DM (diabetes mellitus) ([facility]) 2018     Abnormal albumin 2017     Peripheral neuropathy 2017     Seasonal allergies 2017     Physical deconditioning 2017     Hyperlipidemia 2017     Gastroesophageal reflux disease 2017     Cerebral palsy ([facility]) 2017     Spasticity 2017     Ambulatory  dysfunction 03/27/2017     UTI (urinary tract infection) 03/27/2017     Bipolar disorder ([facility]) 03/27/2017     Hypotension (SIRS vs Sepsis) 04/11/2016     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[facility]) 02/01/2016     Weight loss 01/21/2016     Osteoporosis 01/19/2016     Resting tremor 01/19/2016     Acute metabolic encephalopathy 12/11/2015     Gait abnormality 12/11/2015     Platelets decreased ([facility]) 07/31/2015     Other chronic pain 07/30/2015     Postmenopausal vaginal bleeding 07/23/2015     Anxiety 07/15/2015     Menopause 09/25/2014     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/28/2014     Atopic dermatitis 06/02/2014     Dry eye 06/02/2014     Constipation 04/12/2013     Iron deficiency anemia 03/27/2013     Incontinence 03/27/2013     Hypothyroidism 01/10/2013       LOS (days): 6  Geometric Mean LOS (GMLOS) (days):   Days to GMLOS:     OBJECTIVE:  Risk of Unplanned Readmission Score: 32.22         Current admission status: Inpatient   Preferred Pharmacy:   [facility] - [address]  [address]  [address]  [place] [address]  Phone: [phone] Fax: [phone]    [facility]  [address]  [place] [address]  Phone: [phone] Fax: [phone]    [facility] [place] - [address]  [address] [address]  Phone: [phone] Fax: [phone]    Primary Care Provider: Gunnar Sylvester DO    Primary Insurance: [employer] MEDICARE [facility] REP  Secondary Insurance: [place] [employer]    DISCHARGE DETAILS:    Additional Comments: CM spoke with group home nurse (Lisa) and was told they do not have enough staffing this weekend to accept pt back due to the holiday weekend. Per Lisa they cannot accept pt back until Tuesday.

## 2024-08-30 NOTE — PROGRESS NOTES
Terrie Alicea is a 64 y.o. female who is currently ordered Vancomycin IV with management by the Pharmacy Consult service.  Relevant clinical data and objective / subjective history reviewed.  Vancomycin Assessment:  Indication and Goal AUC/Trough: Other, MRSA, -600, trough >10  Clinical Status:  critical care  Micro:     Renal Function:  SCr: 0.94 mg/dL  CrCl: 39.9 mL/min  Renal replacement: Not on dialysis  Days of Therapy: 1  Current Dose: 750 mg IV q12h  Vancomycin Plan:  New Dosin mg IV q24h  Estimated AUC: 542 mcg*hr/mL  Estimated Trough: 13.2 mcg/mL  Next Level:  at 0600  Renal Function Monitoring: Daily BMP and UOP  Pharmacy will continue to follow closely for s/sx of nephrotoxicity, infusion reactions and appropriateness of therapy.  BMP and CBC will be ordered per protocol. We will continue to follow the patient’s culture results and clinical progress daily.    Mary Jane Bailon, Pharmacist

## 2024-08-30 NOTE — ASSESSMENT & PLAN NOTE
MAP 54 with 2L IV fluids, prompting critical care evaluation.  Infectious vs medication side effect  No WBC and does not meet sepsis criteria  Lactic acid 2.8  Was previously admitted with aspiration PNA and recurrent UTI  Urine cx on 8/20 grew pan sensitive E. Coli.  Currently not on abx  Bedside POCUS, IVC ~2, non collapsible    Plan:  30 cc/kg IV fluids  Levophed started, Maintain MAP > 65  Ordered CXR (pending)  Blood cx, UA/urine culture, and MRSA culture ordered  D/c Metformin and other sedating meds  Monitor BP

## 2024-08-30 NOTE — QUICK NOTE
Spoke to POA on the phone at 0723 on 8/30 and updated him regarding patient's transfer to the ICU and current plan of management.

## 2024-08-30 NOTE — CONSULTS
Vancomycin IV Pharmacy-to-Dose Consultation    Terrie Alicea is a 64 y.o. female who was receiving Vancomycin IV with management by the Pharmacy Consult service for treatment of Other MRSA.       The patient’s Vancomycin therapy has been discontinued. Thank you for allowing us to take part in this patient's care. Pharmacy will sign-off now; please call or re-consult if there are any questions.      Martha May, PharmD  Critical Care & Internal Medicine Clinical Pharmacist   Available via Epic Secure Chat

## 2024-08-31 LAB
ALBUMIN SERPL BCG-MCNC: 3.2 G/DL (ref 3.5–5)
ALP SERPL-CCNC: 50 U/L (ref 34–104)
ALT SERPL W P-5'-P-CCNC: 4 U/L (ref 7–52)
ANION GAP SERPL CALCULATED.3IONS-SCNC: 4 MMOL/L (ref 4–13)
AST SERPL W P-5'-P-CCNC: 10 U/L (ref 13–39)
BASOPHILS # BLD AUTO: 0.04 THOUSANDS/ΜL (ref 0–0.1)
BASOPHILS NFR BLD AUTO: 1 % (ref 0–1)
BILIRUB SERPL-MCNC: 0.26 MG/DL (ref 0.2–1)
BUN SERPL-MCNC: 13 MG/DL (ref 5–25)
CALCIUM ALBUM COR SERPL-MCNC: 10.1 MG/DL (ref 8.3–10.1)
CALCIUM SERPL-MCNC: 9.5 MG/DL (ref 8.4–10.2)
CHLORIDE SERPL-SCNC: 104 MMOL/L (ref 96–108)
CO2 SERPL-SCNC: 31 MMOL/L (ref 21–32)
CREAT SERPL-MCNC: 0.82 MG/DL (ref 0.6–1.3)
EOSINOPHIL # BLD AUTO: 0.65 THOUSAND/ΜL (ref 0–0.61)
EOSINOPHIL NFR BLD AUTO: 8 % (ref 0–6)
ERYTHROCYTE [DISTWIDTH] IN BLOOD BY AUTOMATED COUNT: 13.4 % (ref 11.6–15.1)
GFR SERPL CREATININE-BSD FRML MDRD: 76 ML/MIN/1.73SQ M
GLUCOSE SERPL-MCNC: 123 MG/DL (ref 65–140)
GLUCOSE SERPL-MCNC: 141 MG/DL (ref 65–140)
GLUCOSE SERPL-MCNC: 176 MG/DL (ref 65–140)
GLUCOSE SERPL-MCNC: 206 MG/DL (ref 65–140)
GLUCOSE SERPL-MCNC: 209 MG/DL (ref 65–140)
GLUCOSE SERPL-MCNC: 222 MG/DL (ref 65–140)
GLUCOSE SERPL-MCNC: 254 MG/DL (ref 65–140)
GLUCOSE SERPL-MCNC: 256 MG/DL (ref 65–140)
GLUCOSE SERPL-MCNC: 261 MG/DL (ref 65–140)
GLUCOSE SERPL-MCNC: 296 MG/DL (ref 65–140)
GLUCOSE SERPL-MCNC: 298 MG/DL (ref 65–140)
HCT VFR BLD AUTO: 29 % (ref 34.8–46.1)
HGB BLD-MCNC: 9.3 G/DL (ref 11.5–15.4)
IMM GRANULOCYTES # BLD AUTO: 0.04 THOUSAND/UL (ref 0–0.2)
IMM GRANULOCYTES NFR BLD AUTO: 1 % (ref 0–2)
LYMPHOCYTES # BLD AUTO: 1.67 THOUSANDS/ΜL (ref 0.6–4.47)
LYMPHOCYTES NFR BLD AUTO: 19 % (ref 14–44)
MAGNESIUM SERPL-MCNC: 1.8 MG/DL (ref 1.9–2.7)
MCH RBC QN AUTO: 32.3 PG (ref 26.8–34.3)
MCHC RBC AUTO-ENTMCNC: 32.1 G/DL (ref 31.4–37.4)
MCV RBC AUTO: 101 FL (ref 82–98)
MONOCYTES # BLD AUTO: 0.51 THOUSAND/ΜL (ref 0.17–1.22)
MONOCYTES NFR BLD AUTO: 6 % (ref 4–12)
MRSA NOSE QL CULT: NORMAL
NEUTROPHILS # BLD AUTO: 5.68 THOUSANDS/ΜL (ref 1.85–7.62)
NEUTS SEG NFR BLD AUTO: 65 % (ref 43–75)
NRBC BLD AUTO-RTO: 0 /100 WBCS
PHOSPHATE SERPL-MCNC: 2.7 MG/DL (ref 2.3–4.1)
PLATELET # BLD AUTO: 237 THOUSANDS/UL (ref 149–390)
PMV BLD AUTO: 11.3 FL (ref 8.9–12.7)
POTASSIUM SERPL-SCNC: 4 MMOL/L (ref 3.5–5.3)
PROT SERPL-MCNC: 5.7 G/DL (ref 6.4–8.4)
RBC # BLD AUTO: 2.88 MILLION/UL (ref 3.81–5.12)
SODIUM SERPL-SCNC: 139 MMOL/L (ref 135–147)
WBC # BLD AUTO: 8.59 THOUSAND/UL (ref 4.31–10.16)

## 2024-08-31 PROCEDURE — 80053 COMPREHEN METABOLIC PANEL: CPT

## 2024-08-31 PROCEDURE — 84100 ASSAY OF PHOSPHORUS: CPT

## 2024-08-31 PROCEDURE — 82948 REAGENT STRIP/BLOOD GLUCOSE: CPT

## 2024-08-31 PROCEDURE — 83735 ASSAY OF MAGNESIUM: CPT

## 2024-08-31 PROCEDURE — 99232 SBSQ HOSP IP/OBS MODERATE 35: CPT | Performed by: HOSPITALIST

## 2024-08-31 PROCEDURE — 85025 COMPLETE CBC W/AUTO DIFF WBC: CPT

## 2024-08-31 PROCEDURE — 99024 POSTOP FOLLOW-UP VISIT: CPT | Performed by: SURGERY

## 2024-08-31 RX ORDER — INSULIN GLARGINE 100 [IU]/ML
10 INJECTION, SOLUTION SUBCUTANEOUS
Status: DISCONTINUED | OUTPATIENT
Start: 2024-08-31 | End: 2024-09-01

## 2024-08-31 RX ORDER — MAGNESIUM SULFATE HEPTAHYDRATE 40 MG/ML
2 INJECTION, SOLUTION INTRAVENOUS ONCE
Status: COMPLETED | OUTPATIENT
Start: 2024-08-31 | End: 2024-08-31

## 2024-08-31 RX ORDER — INSULIN GLARGINE 100 [IU]/ML
15 INJECTION, SOLUTION SUBCUTANEOUS
Status: DISCONTINUED | OUTPATIENT
Start: 2024-08-31 | End: 2024-08-31

## 2024-08-31 RX ADMIN — CARBIDOPA AND LEVODOPA 1 TABLET: 25; 100 TABLET ORAL at 21:35

## 2024-08-31 RX ADMIN — DOCOSANOL: 100 CREAM TOPICAL at 21:36

## 2024-08-31 RX ADMIN — DOCOSANOL: 100 CREAM TOPICAL at 05:15

## 2024-08-31 RX ADMIN — INSULIN LISPRO 3 UNITS: 100 INJECTION, SOLUTION INTRAVENOUS; SUBCUTANEOUS at 02:38

## 2024-08-31 RX ADMIN — OXYBUTYNIN CHLORIDE 5 MG: 5 TABLET ORAL at 17:16

## 2024-08-31 RX ADMIN — INSULIN LISPRO 3 UNITS: 100 INJECTION, SOLUTION INTRAVENOUS; SUBCUTANEOUS at 08:47

## 2024-08-31 RX ADMIN — MAGNESIUM SULFATE HEPTAHYDRATE 2 G: 40 INJECTION, SOLUTION INTRAVENOUS at 10:19

## 2024-08-31 RX ADMIN — BACLOFEN 10 MG: 10 TABLET ORAL at 17:16

## 2024-08-31 RX ADMIN — METHENAMINE HIPPURATE 1 G: 1 TABLET ORAL at 17:17

## 2024-08-31 RX ADMIN — DOCOSANOL: 100 CREAM TOPICAL at 15:10

## 2024-08-31 RX ADMIN — FERROUS SULFATE 300 MG: 300 SOLUTION ORAL at 17:16

## 2024-08-31 RX ADMIN — BACLOFEN 10 MG: 10 TABLET ORAL at 08:48

## 2024-08-31 RX ADMIN — ARIPIPRAZOLE 2 MG: 2 TABLET ORAL at 21:36

## 2024-08-31 RX ADMIN — POLYETHYLENE GLYCOL 3350 17 G: 17 POWDER, FOR SOLUTION ORAL at 08:48

## 2024-08-31 RX ADMIN — METHENAMINE HIPPURATE 1 G: 1 TABLET ORAL at 10:19

## 2024-08-31 RX ADMIN — CALCIUM CARBONATE (ANTACID) CHEW TAB 500 MG 750 MG: 500 CHEW TAB at 21:35

## 2024-08-31 RX ADMIN — CARBIDOPA AND LEVODOPA 1 TABLET: 25; 100 TABLET ORAL at 08:48

## 2024-08-31 RX ADMIN — OXYBUTYNIN CHLORIDE 5 MG: 5 TABLET ORAL at 08:48

## 2024-08-31 RX ADMIN — DOCOSANOL: 100 CREAM TOPICAL at 17:16

## 2024-08-31 RX ADMIN — OXYCODONE HYDROCHLORIDE AND ACETAMINOPHEN 1000 MG: 500 TABLET ORAL at 08:48

## 2024-08-31 RX ADMIN — CARBIDOPA AND LEVODOPA 1 TABLET: 25; 100 TABLET ORAL at 12:14

## 2024-08-31 RX ADMIN — BACLOFEN 10 MG: 10 TABLET ORAL at 21:35

## 2024-08-31 RX ADMIN — OXYBUTYNIN CHLORIDE 5 MG: 5 TABLET ORAL at 21:35

## 2024-08-31 RX ADMIN — PRAVASTATIN SODIUM 40 MG: 40 TABLET ORAL at 17:16

## 2024-08-31 RX ADMIN — INSULIN LISPRO 2 UNITS: 100 INJECTION, SOLUTION INTRAVENOUS; SUBCUTANEOUS at 12:17

## 2024-08-31 RX ADMIN — Medication 20 MG: at 08:53

## 2024-08-31 RX ADMIN — INSULIN GLARGINE 10 UNITS: 100 INJECTION, SOLUTION SUBCUTANEOUS at 21:34

## 2024-08-31 RX ADMIN — FERROUS SULFATE 300 MG: 300 SOLUTION ORAL at 05:15

## 2024-08-31 RX ADMIN — CARBIDOPA AND LEVODOPA 1 TABLET: 25; 100 TABLET ORAL at 17:16

## 2024-08-31 RX ADMIN — CITALOPRAM HYDROBROMIDE 40 MG: 20 TABLET ORAL at 08:48

## 2024-08-31 RX ADMIN — CALCIUM CARBONATE (ANTACID) CHEW TAB 500 MG 750 MG: 500 CHEW TAB at 08:49

## 2024-08-31 RX ADMIN — LORAZEPAM 0.5 MG: 0.5 TABLET ORAL at 21:35

## 2024-08-31 RX ADMIN — Medication 1000 UNITS: at 08:49

## 2024-08-31 RX ADMIN — LEVOTHYROXINE SODIUM 25 MCG: 25 TABLET ORAL at 08:49

## 2024-08-31 RX ADMIN — ENOXAPARIN SODIUM 40 MG: 40 INJECTION SUBCUTANEOUS at 08:48

## 2024-08-31 RX ADMIN — ACETAMINOPHEN 650 MG: 650 SUSPENSION ORAL at 21:35

## 2024-08-31 NOTE — PROGRESS NOTES
[facility]  Progress Note  Name: Terrie Alicea I  MRN: [id number]  Unit/Bed#: S -01 I Date of Admission: 8/24/2024   Date of Service: 8/31/2024 I Hospital Day: 7    Assessment & Plan   * Dysphagia  Assessment & Plan  CT neck no organic obstructions in the mediastinum. Pt has had poor PO intake, malnutrition requiring gastrostomy tube placement on 08/26. Per group home, pt attends day program which cannot manage tube feeds so requesting her to receive it from 6pm to 6am. Settings adjusted to take this into consideration per nutrition.    Plan:  - Per Nutrition: Recommend trial cyclic feeds of Glucerna 1.2 @ 80 ml/hr X 12 hrs (Run 6 pm-6am). This provides 960 ml total volume, 1152 kcals, 58 g protein, and 109 g CHO. If blood sugars remain elevated on cyclic feeds, switch to continuous feeds of Glucerna 1.2 @ 40 ml/hr X 24 hrs to see if lower volume/more time improves blood sugars. Re-consult RD as needed.   - Continue Glucerna 1.2 as tolerated per nutrition   - increase by 10 cc/hr q4h until goal rate achieved  - 60 mL water flush before & after TF cycle, and additional 500 mL water daily that can be divided amongst med passes  - Per SLP: can introduce pleasure feeds or puree & HTL as tolerated    DM (diabetes mellitus) (HCC)  Assessment & Plan  Lab Results   Component Value Date    HGBA1C 6.2 (H) 08/13/2024     Recent Labs     08/31/24  0231 08/31/24  0504 08/31/24  0703 08/31/24  0927   POCGLU 254* 209* 256* 298*     Blood Sugar Average: Last 72 hrs:  (P) [id number]    PMHx of diabetes. Pt having hyperglycemic episodes in the setting of tube feeds, possibly related to refeeding syndrome although pt has not had hypokalemia, hypomagnesemia, hypophosphatemia. Required insulin drip on 8/29 as BG>500, discontinued when BG<200. However, pt became hyperglycemic overnight 8/30 in the setting of tube feeds, MAP ~54 w minimal response to fluids and albumin, transferred to ICU. Per  review of crit care notes, pt was briefly on Levophed; low suspicion of infectious etiology as pt has not had leukocytosis and has been afebrile, imaging unremarkable. Pt stable, nutrition switched feed from Jevity 1.5 to Glucerna 1.2, transferred back to med/surg on 8/31. Continue sliding scale, algorithm 3. Initiating insulin lantus 10 units bedtime to achieve better sustained control of BG.    Plan:  - Initiate insulin lantus 10 units q HS, first dose tonight 08/31.  - Continue insulin sliding scale, algorithm 3.  - BG checks q6h  - Repeat BMP, Magnesium  - Continue to hold metformin until repeat lactic acid normalizes    Mood disturbances  Assessment & Plan  Continue home meds: Abilify 2 mg, Citalopram 40 mg.  Pt previously on trazodone 100 mg q HS which was discontinued on 8/30 while in the ICU d/t contributions to sedation.        Parkinson's disease  Assessment & Plan  History of mood disturbances, previously on neuroleptics  Tardive dyskinesia on exam    Sinemet  qid. Can defer changes to outpatient neurology.     Incontinence  Assessment & Plan  Continue oxybutynin 5 mg TID    UTI (urinary tract infection)  Assessment & Plan  Previous culture 8/20-grew E. coli, pansensitive  CT abdomen pelvis demonstrates wall thickening of urinary bladder  Concurrent aspiration pneumonia  ABX course complete      Cerebral palsy (HCC)  Assessment & Plan  Continue baclofen 10 mg TID         VTE Pharmacologic Prophylaxis: VTE Score: 7 High Risk (Score >/= 5) - Pharmacological DVT Prophylaxis Ordered: enoxaparin (Lovenox). Sequential Compression Devices Ordered.    Mobility:   Basic Mobility Inpatient Raw Score: 6  JH-HLM Goal: 2: Bed activities/Dependent transfer  JH-HLM Achieved: 2: Bed activities/Dependent transfer  JH-HLM Goal achieved. Continue to encourage appropriate mobility.    Patient Centered Rounds: I performed bedside rounds with nursing staff today.  Discussions with Specialists or Other Care Team Provider:      Education and Discussions with Family / Patient: Updated  (caregiver, Lisa) via phone.    Current Length of Stay: 7 day(s)  Current Patient Status: Inpatient   Discharge Plan: Anticipate discharge in 24-48 hrs to group home as pt tolerates feeds.    Code Status: Level 1 - Full Code    Subjective:   Pt evaluated at bedside for continuity of care, pt is resting comfortably in bed and does not appear in any acute distress, opens her eyes in response to her name. No evidence of acute distress on exam, physical examination largely at baseline.     Objective:     Vitals:   Temp (24hrs), Av.5 °F (36.9 °C), Min:97.3 °F (36.3 °C), Max:99.5 °F (37.5 °C)    Temp:  [97.3 °F (36.3 °C)-99.5 °F (37.5 °C)] 99.5 °F (37.5 °C)  HR:  [60-79] 61  Resp:  [14-20] 20  BP: ()/(52-65) 107/62  SpO2:  [91 %-96 %] 95 %  Body mass index is 19.26 kg/m².     Input and Output Summary (last 24 hours):     Intake/Output Summary (Last 24 hours) at 2024 1048  Last data filed at 2024 0301  Gross per 24 hour   Intake 240 ml   Output 167 ml   Net 73 ml       Physical Exam:   Physical Exam  Vitals reviewed.   Constitutional:       General: She is not in acute distress.     Appearance: She is underweight.   HENT:      Head: Atraumatic.   Eyes:      Conjunctiva/sclera: Conjunctivae normal.   Cardiovascular:      Rate and Rhythm: Normal rate and regular rhythm.      Pulses: Normal pulses.      Heart sounds: Normal heart sounds.   Pulmonary:      Effort: Pulmonary effort is normal. No respiratory distress.      Breath sounds: Normal breath sounds.   Abdominal:      General: There is no distension.      Tenderness: There is no guarding.      Comments: G-tube in place   Musculoskeletal:         General: Deformity (R arm, baseline) present.   Skin:     General: Skin is warm.      Capillary Refill: Capillary refill takes less than 2 seconds.   Neurological:      Mental Status: She is alert. Mental status is at baseline.           Additional Data:     Labs:  Results from last 7 days   Lab Units 08/31/24  0501   WBC Thousand/uL 8.59   HEMOGLOBIN g/dL 9.3*   HEMATOCRIT % 29.0*   PLATELETS Thousands/uL 237   SEGS PCT % 65   LYMPHO PCT % 19   MONO PCT % 6   EOS PCT % 8*     Results from last 7 days   Lab Units 08/31/24  0501   SODIUM mmol/L 139   POTASSIUM mmol/L 4.0   CHLORIDE mmol/L 104   CO2 mmol/L 31   BUN mg/dL 13   CREATININE mg/dL 0.82   ANION GAP mmol/L 4   CALCIUM mg/dL 9.5   ALBUMIN g/dL 3.2*   TOTAL BILIRUBIN mg/dL 0.26   ALK PHOS U/L 50   ALT U/L 4*   AST U/L 10*   GLUCOSE RANDOM mg/dL 222*         Results from last 7 days   Lab Units 08/31/24  0927 08/31/24  0703 08/31/24  0504 08/31/24  0231 08/30/24  2046 08/30/24  1602 08/30/24  1104 08/30/24  0804 08/30/24  0753 08/30/24  0601 08/30/24  0416 08/30/24  0301   POC GLUCOSE mg/dl 298* 256* 209* 254* 140 237* 135 102 119 188* 270* 391*         Results from last 7 days   Lab Units 08/30/24  0512 08/30/24  0345 08/27/24  1151 08/27/24  0538 08/26/24  0451 08/25/24  0344 08/24/24  1740   LACTIC ACID mmol/L 2.2* 2.8* 1.5  --   --   --   --    PROCALCITONIN ng/ml  --  0.22  --  0.58* 0.62* 1.44* 1.66*       Lines/Drains:  Invasive Devices       Peripheral Intravenous Line  Duration             Peripheral IV 08/28/24 Left;Proximal;Ventral (anterior) Forearm 2 days    Peripheral IV 08/30/24 Distal;Right;Upper;Ventral (anterior) Arm 1 day    Peripheral IV 08/30/24 Proximal;Right;Ventral (anterior) Forearm 1 day    Peripheral IV 08/30/24 Right;Ventral (anterior) Forearm 1 day              Drain  Duration             Gastrostomy/Enterostomy  16 Fr. LUQ 4 days                          Imaging: Reviewed radiology reports from this admission including: chest CT scan and CT neck    Recent Cultures (last 7 days):   Results from last 7 days   Lab Units 08/30/24  0523   BLOOD CULTURE  No Growth at 24 hrs.  No Growth at 24 hrs.       Last 24 Hours Medication List:   Current  Facility-Administered Medications   Medication Dose Route Frequency Provider Last Rate    acetaminophen  650 mg Oral Q4H PRN Lisa Carty MD      ammonium lactate   Topical BID PRN Lisa Carty MD      ARIPiprazole  2 mg Per G Tube HS Lisa Carty MD      artificial tear   Both Eyes HS PRN Lisa Carty MD      vitamin C  1,000 mg Per G Tube Daily Lisa Carty MD      baclofen  10 mg Per G Tube TID Lisa Carty MD      calcium carbonate  750 mg Oral BID Lisa Carty MD      carbamide peroxide  2 drop Both Ears BID PRN Lisa Carty MD      carbidopa-levodopa  1 tablet Oral 4x Daily Lisa Carty MD      cholecalciferol  1,000 Units Per G Tube Daily Lisa Carty MD      citalopram  40 mg Per G Tube Daily Lisa Carty MD      dextromethorphan-guaiFENesin  10 mL Per G Tube Q4H PRN Lisa Carty MD      docosanol   Topical 5x Daily Lisa Carty MD      enoxaparin  40 mg Subcutaneous Daily Lisa Carty MD      Ferrous Sulfate  300 mg Oral BID AC Lisa Carty MD      fluticasone  1 spray Nasal Daily PRN Lisa Carty MD      hydrocortisone   Topical 4x Daily PRN Lisa Carty MD      insulin glargine  10 Units Subcutaneous HS Silvana Mike DO      insulin lispro  1-6 Units Subcutaneous Q6H [facility] Lisa Carty MD      Ketotifen Fumarate  1 drop Both Eyes Daily PRN Lisa Carty MD      levothyroxine  25 mcg Per G Tube QAM Lisa Carty MD      Loratadine  10 mg Oral Daily PRN Lisa Carty MD      LORazepam  0.5 mg Per G Tube HS Sonya Young MD      magnesium sulfate  2 g Intravenous Once Katie Poole MD 2 g (08/31/24 1019)    methenamine hippurate  1 g Oral BID Lisa Carty MD      omeprazole (PRILOSEC) suspension 2 mg/mL  20 mg Oral Daily Lisa Carty MD      oxybutynin  5 mg Per G Tube TID Lisa Carty MD      polyethylene glycol  17 g Oral Daily Lisa Carty MD      pravastatin  40 mg Per G Tube Daily With Dinner Lisa Carty MD          Today, Patient Was Seen By: Mosam Cee, DO  PGY-1    **Please Note:  This note may have been constructed using a voice recognition system.**

## 2024-08-31 NOTE — PROGRESS NOTES
Progress Note - General Surgery  Terrie Alicea 64 y.o. female MRN: [id number]  Unit/Bed#: S -01 Encounter: [id number]    Assessment:  64 y.o. female with history of cerebral palsy, admitted for failure to thrive, is s/p open gastrostomy tube placement on 8/26, who was transferred to ICU secondary to hypotension 8/30.  Patient was briefly on vasopressin before being weaned off.  CT scan reviewed showed appropriate postoperative changes and free air.  She has since stabilized and been transferred back to the floor.  G-tube is measuring 5 cm at the bumper    Plan:  - Diet Enteral/Parenteral; Tube Feeding No Oral Diet; Glucerna 1.2; Cyclic; 80; 12 hours; 60; Water; Before and After each Cycled Nocturnal TF  - Pain and Nausea control PRN  - Incentive spirometry  - OOB, ambulate  - Continue tube feeds at goal  - Blood glucose control  - Management per ICU team  - Please reach out if needed  - Please message the General surgery resident role with any concerns    Subjective/Objective     Subjective: No acute overnight events.      Objective:   Vitals: Blood pressure 104/56, pulse 79, temperature (!) 97.3 °F (36.3 °C), resp. rate 20, weight 41.8 kg (92 lb 2.4 oz), last menstrual period 11/29/2009, SpO2 93%, not currently breastfeeding.,Body mass index is 19.26 kg/m².    I/O         08/29 0701  08/30 0700 08/30 0701  08/31 0700    P.O.  0    I.V. (mL/kg)  87 (2.1)    NG/ 240    IV Piggyback  750    Total Intake(mL/kg) 335 (8) 1077 (25.8)    Urine (mL/kg/hr) 792 (0.8) 167 (0.2)    Total Output 792 167    Net -457 +910          Unmeasured Urine Occurrence 2 x 2 x            Physical Exam:  Gen: NAD, Aox3, Comfortable in bed  Chest: Normal work of breathing, no respiratory distress  Abd: Soft, ND, NT.  Patient resting comfortably.  G-tube is 5 cm of the bumper.  Tube feeds running at 30 cc per  Ext: No Edema  Skin: Warm, Dry, Intact      Lab, Imaging and other studies: I have personally reviewed pertinent reports.   ", CBC with diff:   Lab Results   Component Value Date    WBC 8.59 08/31/2024    HGB 9.3 (L) 08/31/2024    HCT 29.0 (L) 08/31/2024     (H) 08/31/2024     08/31/2024    RBC 2.88 (L) 08/31/2024    MCH 32.3 08/31/2024    MCHC 32.1 08/31/2024    RDW 13.4 08/31/2024    MPV 11.3 08/31/2024    NRBC 0 08/31/2024   , BMP/CMP:   Lab Results   Component Value Date    SODIUM 139 08/31/2024    K 4.0 08/31/2024     08/31/2024    CO2 31 08/31/2024    BUN 13 08/31/2024    CREATININE 0.82 08/31/2024    CALCIUM 9.5 08/31/2024    AST 10 (L) 08/31/2024    ALT 4 (L) 08/31/2024    ALKPHOS 50 08/31/2024    EGFR 76 08/31/2024   , Magnesium: No components found for: \"MAG\"  VTE Pharmacologic Prophylaxis: Enoxaparin (Lovenox)  VTE Mechanical Prophylaxis: sequential compression device        Amalia Delvalle MD  8/31/2024  5:13 AM      "

## 2024-08-31 NOTE — PLAN OF CARE
Problem: Nutrition/Hydration-ADULT  Goal: Nutrient/Hydration intake appropriate for improving, restoring or maintaining nutritional needs  Description: Monitor and assess patient's nutrition/hydration status for malnutrition. Collaborate with interdisciplinary team and initiate plan and interventions as ordered.  Monitor patient's weight and dietary intake as ordered or per policy. Utilize nutrition screening tool and intervene as necessary. Determine patient's food preferences and provide high-protein, high-caloric foods as appropriate.     INTERVENTIONS:  - Monitor oral intake, urinary output, labs, and treatment plans  - Assess nutrition and hydration status and recommend course of action  - Evaluate amount of meals eaten  - Assist patient with eating if necessary   - Allow adequate time for meals  - Recommend/ encourage appropriate diets, oral nutritional supplements, and vitamin/mineral supplements  - Order, calculate, and assess calorie counts as needed  - Recommend, monitor, and adjust tube feedings and TPN/PPN based on assessed needs  - Assess need for intravenous fluids  - Provide specific nutrition/hydration education as appropriate  - Include patient/family/caregiver in decisions related to nutrition  Outcome: Progressing     Problem: Prexisting or High Potential for Compromised Skin Integrity  Goal: Skin integrity is maintained or improved  Description: INTERVENTIONS:  - Identify patients at risk for skin breakdown  - Assess and monitor skin integrity  - Assess and monitor nutrition and hydration status  - Monitor labs   - Assess for incontinence   - Turn and reposition patient  - Assist with mobility/ambulation  - Relieve pressure over bony prominences  - Avoid friction and shearing  - Provide appropriate hygiene as needed including keeping skin clean and dry  - Evaluate need for skin moisturizer/barrier cream  - Collaborate with interdisciplinary team   - Patient/family teaching  - Consider wound  care consult   Outcome: Progressing     Problem: Potential for Falls  Goal: Patient will remain free of falls  Description: INTERVENTIONS:  - Educate patient/family on patient safety including physical limitations  - Instruct patient to call for assistance with activity   - Consult OT/PT to assist with strengthening/mobility   - Keep Call bell within reach  - Keep bed low and locked with side rails adjusted as appropriate  - Keep care items and personal belongings within reach  - Initiate and maintain comfort rounds  - Make Fall Risk Sign visible to staff  - Offer Toileting every  Hours, in advance of need  - Initiate/Maintain alarm  - Obtain necessary fall risk management equipment:   - Apply yellow socks and bracelet for high fall risk patients  - Consider moving patient to room near nurses station  Outcome: Progressing

## 2024-08-31 NOTE — PLAN OF CARE
Problem: Nutrition/Hydration-ADULT  Goal: Nutrient/Hydration intake appropriate for improving, restoring or maintaining nutritional needs  Description: Monitor and assess patient's nutrition/hydration status for malnutrition. Collaborate with interdisciplinary team and initiate plan and interventions as ordered.  Monitor patient's weight and dietary intake as ordered or per policy. Utilize nutrition screening tool and intervene as necessary. Determine patient's food preferences and provide high-protein, high-caloric foods as appropriate.     INTERVENTIONS:  - Monitor oral intake, urinary output, labs, and treatment plans  - Assess nutrition and hydration status and recommend course of action  - Evaluate amount of meals eaten  - Assist patient with eating if necessary   - Allow adequate time for meals  - Recommend/ encourage appropriate diets, oral nutritional supplements, and vitamin/mineral supplements  - Order, calculate, and assess calorie counts as needed  - Recommend, monitor, and adjust tube feedings and TPN/PPN based on assessed needs  - Assess need for intravenous fluids  - Provide specific nutrition/hydration education as appropriate  - Include patient/family/caregiver in decisions related to nutrition  Outcome: Progressing     Problem: Prexisting or High Potential for Compromised Skin Integrity  Goal: Skin integrity is maintained or improved  Description: INTERVENTIONS:  - Identify patients at risk for skin breakdown  - Assess and monitor skin integrity  - Assess and monitor nutrition and hydration status  - Monitor labs   - Assess for incontinence   - Turn and reposition patient  - Assist with mobility/ambulation  - Relieve pressure over bony prominences  - Avoid friction and shearing  - Provide appropriate hygiene as needed including keeping skin clean and dry  - Evaluate need for skin moisturizer/barrier cream  - Collaborate with interdisciplinary team   - Patient/family teaching  - Consider wound  care consult   Outcome: Progressing     Problem: Potential for Falls  Goal: Patient will remain free of falls  Description: INTERVENTIONS:  - Educate patient/family on patient safety including physical limitations  - Instruct patient to call for assistance with activity   - Consult OT/PT to assist with strengthening/mobility   - Keep Call bell within reach  - Keep bed low and locked with side rails adjusted as appropriate  - Keep care items and personal belongings within reach  - Initiate and maintain comfort rounds  - Make Fall Risk Sign visible to staff  - Offer Toileting every 2 Hours, in advance of need  - Initiate/Maintain alarm  - Obtain necessary fall risk management equipment:   - Apply yellow socks and bracelet for high fall risk patients  - Consider moving patient to room near nurses station  Outcome: Progressing     Problem: GASTROINTESTINAL - ADULT  Goal: Minimal or absence of nausea and/or vomiting  Description: INTERVENTIONS:  - Administer IV fluids if ordered to ensure adequate hydration  - Maintain NPO status until nausea and vomiting are resolved  - Nasogastric tube if ordered  - Administer ordered antiemetic medications as needed  - Provide nonpharmacologic comfort measures as appropriate  - Advance diet as tolerated, if ordered  - Consider nutrition services referral to assist patient with adequate nutrition and appropriate food choices  Outcome: Progressing  Goal: Maintains or returns to baseline bowel function  Description: INTERVENTIONS:  - Assess bowel function  - Encourage oral fluids to ensure adequate hydration  - Administer IV fluids if ordered to ensure adequate hydration  - Administer ordered medications as needed  - Encourage mobilization and activity  - Consider nutritional services referral to assist patient with adequate nutrition and appropriate food choices  Outcome: Progressing  Goal: Maintains adequate nutritional intake  Description: INTERVENTIONS:  - Monitor percentage of each  meal consumed  - Identify factors contributing to decreased intake, treat as appropriate  - Assist with meals as needed  - Monitor I&O, weight, and lab values if indicated  - Obtain nutrition services referral as needed  Outcome: Progressing  Goal: Oral mucous membranes remain intact  Description: INTERVENTIONS  - Assess oral mucosa and hygiene practices  - Implement preventative oral hygiene regimen  - Implement oral medicated treatments as ordered  - Initiate Nutrition services referral as needed  Outcome: Progressing

## 2024-09-01 ENCOUNTER — APPOINTMENT (INPATIENT)
Dept: RADIOLOGY | Facility: HOSPITAL | Age: 64
DRG: 391 | End: 2024-09-01
Payer: MEDICARE

## 2024-09-01 LAB
ANION GAP SERPL CALCULATED.3IONS-SCNC: 8 MMOL/L (ref 4–13)
BASOPHILS # BLD AUTO: 0.03 THOUSANDS/ΜL (ref 0–0.1)
BASOPHILS NFR BLD AUTO: 0 % (ref 0–1)
BUN SERPL-MCNC: 14 MG/DL (ref 5–25)
CALCIUM SERPL-MCNC: 10 MG/DL (ref 8.4–10.2)
CHLORIDE SERPL-SCNC: 99 MMOL/L (ref 96–108)
CO2 SERPL-SCNC: 31 MMOL/L (ref 21–32)
CREAT SERPL-MCNC: 0.95 MG/DL (ref 0.6–1.3)
EOSINOPHIL # BLD AUTO: 0.24 THOUSAND/ΜL (ref 0–0.61)
EOSINOPHIL NFR BLD AUTO: 3 % (ref 0–6)
ERYTHROCYTE [DISTWIDTH] IN BLOOD BY AUTOMATED COUNT: 13.3 % (ref 11.6–15.1)
GFR SERPL CREATININE-BSD FRML MDRD: 64 ML/MIN/1.73SQ M
GLUCOSE SERPL-MCNC: 167 MG/DL (ref 65–140)
GLUCOSE SERPL-MCNC: 220 MG/DL (ref 65–140)
GLUCOSE SERPL-MCNC: 235 MG/DL (ref 65–140)
GLUCOSE SERPL-MCNC: 241 MG/DL (ref 65–140)
GLUCOSE SERPL-MCNC: 243 MG/DL (ref 65–140)
GLUCOSE SERPL-MCNC: 247 MG/DL (ref 65–140)
GLUCOSE SERPL-MCNC: 251 MG/DL (ref 65–140)
GLUCOSE SERPL-MCNC: 294 MG/DL (ref 65–140)
GLUCOSE SERPL-MCNC: 332 MG/DL (ref 65–140)
GLUCOSE SERPL-MCNC: 333 MG/DL (ref 65–140)
GLUCOSE SERPL-MCNC: 335 MG/DL (ref 65–140)
HCT VFR BLD AUTO: 31.1 % (ref 34.8–46.1)
HGB BLD-MCNC: 10.2 G/DL (ref 11.5–15.4)
IMM GRANULOCYTES # BLD AUTO: 0.04 THOUSAND/UL (ref 0–0.2)
IMM GRANULOCYTES NFR BLD AUTO: 1 % (ref 0–2)
LACTATE SERPL-SCNC: 1.1 MMOL/L (ref 0.5–2)
LYMPHOCYTES # BLD AUTO: 1.31 THOUSANDS/ΜL (ref 0.6–4.47)
LYMPHOCYTES NFR BLD AUTO: 17 % (ref 14–44)
MAGNESIUM SERPL-MCNC: 2 MG/DL (ref 1.9–2.7)
MCH RBC QN AUTO: 32.5 PG (ref 26.8–34.3)
MCHC RBC AUTO-ENTMCNC: 32.8 G/DL (ref 31.4–37.4)
MCV RBC AUTO: 99 FL (ref 82–98)
MONOCYTES # BLD AUTO: 0.44 THOUSAND/ΜL (ref 0.17–1.22)
MONOCYTES NFR BLD AUTO: 6 % (ref 4–12)
NEUTROPHILS # BLD AUTO: 5.46 THOUSANDS/ΜL (ref 1.85–7.62)
NEUTS SEG NFR BLD AUTO: 73 % (ref 43–75)
NRBC BLD AUTO-RTO: 0 /100 WBCS
PLATELET # BLD AUTO: 275 THOUSANDS/UL (ref 149–390)
PMV BLD AUTO: 11.1 FL (ref 8.9–12.7)
POTASSIUM SERPL-SCNC: 4.1 MMOL/L (ref 3.5–5.3)
RBC # BLD AUTO: 3.14 MILLION/UL (ref 3.81–5.12)
SODIUM SERPL-SCNC: 138 MMOL/L (ref 135–147)
WBC # BLD AUTO: 7.52 THOUSAND/UL (ref 4.31–10.16)

## 2024-09-01 PROCEDURE — 83605 ASSAY OF LACTIC ACID: CPT | Performed by: HOSPITALIST

## 2024-09-01 PROCEDURE — 80048 BASIC METABOLIC PNL TOTAL CA: CPT | Performed by: HOSPITALIST

## 2024-09-01 PROCEDURE — 99232 SBSQ HOSP IP/OBS MODERATE 35: CPT | Performed by: HOSPITALIST

## 2024-09-01 PROCEDURE — 85025 COMPLETE CBC W/AUTO DIFF WBC: CPT | Performed by: HOSPITALIST

## 2024-09-01 PROCEDURE — 71045 X-RAY EXAM CHEST 1 VIEW: CPT

## 2024-09-01 PROCEDURE — 82948 REAGENT STRIP/BLOOD GLUCOSE: CPT

## 2024-09-01 PROCEDURE — 83735 ASSAY OF MAGNESIUM: CPT | Performed by: HOSPITALIST

## 2024-09-01 RX ORDER — INSULIN GLARGINE 100 [IU]/ML
15 INJECTION, SOLUTION SUBCUTANEOUS
Status: DISCONTINUED | OUTPATIENT
Start: 2024-09-01 | End: 2024-09-02

## 2024-09-01 RX ADMIN — Medication 1000 UNITS: at 09:04

## 2024-09-01 RX ADMIN — INSULIN LISPRO 5 UNITS: 100 INJECTION, SOLUTION INTRAVENOUS; SUBCUTANEOUS at 12:01

## 2024-09-01 RX ADMIN — METHENAMINE HIPPURATE 1 G: 1 TABLET ORAL at 09:06

## 2024-09-01 RX ADMIN — CARBIDOPA AND LEVODOPA 1 TABLET: 25; 100 TABLET ORAL at 11:59

## 2024-09-01 RX ADMIN — CARBIDOPA AND LEVODOPA 1 TABLET: 25; 100 TABLET ORAL at 09:04

## 2024-09-01 RX ADMIN — OXYCODONE HYDROCHLORIDE AND ACETAMINOPHEN 1000 MG: 500 TABLET ORAL at 09:04

## 2024-09-01 RX ADMIN — BACLOFEN 10 MG: 10 TABLET ORAL at 17:33

## 2024-09-01 RX ADMIN — CARBIDOPA AND LEVODOPA 1 TABLET: 25; 100 TABLET ORAL at 22:04

## 2024-09-01 RX ADMIN — DOCOSANOL: 100 CREAM TOPICAL at 17:33

## 2024-09-01 RX ADMIN — POLYETHYLENE GLYCOL 3350 17 G: 17 POWDER, FOR SOLUTION ORAL at 09:04

## 2024-09-01 RX ADMIN — CARBIDOPA AND LEVODOPA 1 TABLET: 25; 100 TABLET ORAL at 17:33

## 2024-09-01 RX ADMIN — METHENAMINE HIPPURATE 1 G: 1 TABLET ORAL at 17:34

## 2024-09-01 RX ADMIN — ACETAMINOPHEN 650 MG: 650 SUSPENSION ORAL at 09:04

## 2024-09-01 RX ADMIN — DOCOSANOL: 100 CREAM TOPICAL at 22:22

## 2024-09-01 RX ADMIN — Medication 20 MG: at 09:06

## 2024-09-01 RX ADMIN — DOCOSANOL: 100 CREAM TOPICAL at 11:59

## 2024-09-01 RX ADMIN — ACETAMINOPHEN 650 MG: 650 SUSPENSION ORAL at 12:59

## 2024-09-01 RX ADMIN — OXYBUTYNIN CHLORIDE 5 MG: 5 TABLET ORAL at 22:04

## 2024-09-01 RX ADMIN — OXYBUTYNIN CHLORIDE 5 MG: 5 TABLET ORAL at 17:33

## 2024-09-01 RX ADMIN — LEVOTHYROXINE SODIUM 25 MCG: 25 TABLET ORAL at 09:05

## 2024-09-01 RX ADMIN — CALCIUM CARBONATE (ANTACID) CHEW TAB 500 MG 750 MG: 500 CHEW TAB at 09:04

## 2024-09-01 RX ADMIN — ARIPIPRAZOLE 2 MG: 2 TABLET ORAL at 22:24

## 2024-09-01 RX ADMIN — DOCOSANOL: 100 CREAM TOPICAL at 14:40

## 2024-09-01 RX ADMIN — INSULIN LISPRO 5 UNITS: 100 INJECTION, SOLUTION INTRAVENOUS; SUBCUTANEOUS at 00:07

## 2024-09-01 RX ADMIN — FERROUS SULFATE 300 MG: 300 SOLUTION ORAL at 17:33

## 2024-09-01 RX ADMIN — CITALOPRAM HYDROBROMIDE 40 MG: 20 TABLET ORAL at 09:04

## 2024-09-01 RX ADMIN — FERROUS SULFATE 300 MG: 300 SOLUTION ORAL at 04:55

## 2024-09-01 RX ADMIN — INSULIN LISPRO 2 UNITS: 100 INJECTION, SOLUTION INTRAVENOUS; SUBCUTANEOUS at 17:33

## 2024-09-01 RX ADMIN — DOCOSANOL: 100 CREAM TOPICAL at 04:54

## 2024-09-01 RX ADMIN — CALCIUM CARBONATE (ANTACID) CHEW TAB 500 MG 750 MG: 500 CHEW TAB at 22:04

## 2024-09-01 RX ADMIN — LORAZEPAM 0.5 MG: 0.5 TABLET ORAL at 22:04

## 2024-09-01 RX ADMIN — BACLOFEN 10 MG: 10 TABLET ORAL at 22:04

## 2024-09-01 RX ADMIN — ENOXAPARIN SODIUM 40 MG: 40 INJECTION SUBCUTANEOUS at 09:04

## 2024-09-01 RX ADMIN — INSULIN LISPRO 3 UNITS: 100 INJECTION, SOLUTION INTRAVENOUS; SUBCUTANEOUS at 04:55

## 2024-09-01 RX ADMIN — BACLOFEN 10 MG: 10 TABLET ORAL at 09:05

## 2024-09-01 RX ADMIN — PRAVASTATIN SODIUM 40 MG: 40 TABLET ORAL at 17:33

## 2024-09-01 RX ADMIN — OXYBUTYNIN CHLORIDE 5 MG: 5 TABLET ORAL at 09:05

## 2024-09-01 RX ADMIN — INSULIN GLARGINE 15 UNITS: 100 INJECTION, SOLUTION SUBCUTANEOUS at 22:28

## 2024-09-01 NOTE — PROGRESS NOTES
[facility] [place]  Progress Note  Name: Terrie Alicea I  MRN: [id number]  Unit/Bed#: S -01 I Date of Admission: 8/24/2024   Date of Service: 9/1/2024 I Hospital Day: 8    Assessment & Plan   * Dysphagia  Assessment & Plan  CT neck no organic obstructions in the mediastinum. Pt has had poor PO intake, malnutrition requiring gastrostomy tube placement on 08/26. Per group home, pt attends day program which cannot manage tube feeds so requesting her to receive it from 6pm to 6am. Settings adjusted to take this into consideration per nutrition.    Plan:  Per Nutrition: Recommend trial cyclic feeds of Glucerna 1.2 @ 80 ml/hr X 12 hrs (Run 6 pm-6am). This provides 960 ml total volume, 1152 kcals, 58 g protein, and 109 g CHO. If blood sugars remain elevated on cyclic feeds, switch to continuous feeds of Glucerna 1.2 @ 40 ml/hr X 24 hrs to see if lower volume/more time improves blood sugars. Re-consult RD as needed.   - Continue Glucerna 1.2 as tolerated per nutrition   - increase by 10 cc/hr q4h until goal rate achieved  - 60 mL water flush before & after TF cycle, and additional 500 mL water daily that can be divided amongst med passes  Per SLP: can introduce pleasure feeds or puree & HTL as tolerated  Currently at tube feed rate of 60, will increase to 70 later today evening  Lantus will be increased to 15 unites HS tonight  CXR and procal ordered for 9/2 am to assess for possibility of aspiration    DM (diabetes mellitus) (HCC)  Assessment & Plan  Lab Results   Component Value Date    HGBA1C 6.2 (H) 08/13/2024     Recent Labs     08/31/24  0231 08/31/24  0504 08/31/24  0703 08/31/24  0927   POCGLU 254* 209* 256* 298*     Blood Sugar Average: Last 72 hrs:  (P) 296.8150921023864087    PMHx of diabetes. Pt having hyperglycemic episodes in the setting of tube feeds, possibly related to refeeding syndrome although pt has not had hypokalemia, hypomagnesemia, hypophosphatemia. Required insulin drip  on 8/29 as BG>500, discontinued when BG<200. However, pt became hyperglycemic overnight 8/30 in the setting of tube feeds, MAP ~54 w minimal response to fluids and albumin, transferred to ICU. Per review of crit care notes, pt was briefly on Levophed; low suspicion of infectious etiology as pt has not had leukocytosis and has been afebrile, imaging unremarkable. Pt stable, nutrition switched feed from Jevity 1.5 to Glucerna 1.2, transferred back to med/surg on 8/31. Continue sliding scale, algorithm 3. Initiating insulin lantus 10 units bedtime to achieve better sustained control of BG.    Plan:  - Increasing to 15u q HS, first dose tonight 9/1  - Continue insulin sliding scale, algorithm 3.  - BG checks q6h  - Repeat BMP, Magnesium  - Continue to hold metformin until repeat lactic acid normalizes    UTI (urinary tract infection)  Assessment & Plan  Previous culture 8/20-grew E. coli, pansensitive  CT abdomen pelvis demonstrates wall thickening of urinary bladder  Concurrent aspiration pneumonia  ABX course complete      Mood disturbances  Assessment & Plan  Continue home meds: Abilify 2 mg, Citalopram 40 mg.  Pt previously on trazodone 100 mg q HS which was discontinued on 8/30 while in the ICU d/t contributions to sedation.        Parkinson's disease  Assessment & Plan  History of mood disturbances, previously on neuroleptics  Tardive dyskinesia on exam    Sinemet  qid. Can defer changes to outpatient neurology.     Incontinence  Assessment & Plan  Continue oxybutynin 5 mg TID    Cerebral palsy (HCC)  Assessment & Plan  Continue baclofen 10 mg TID         VTE Pharmacologic Prophylaxis: VTE Score: 7 High Risk (Score >/= 5) - Pharmacological DVT Prophylaxis Ordered: enoxaparin (Lovenox). Sequential Compression Devices Ordered.    Mobility:   Basic Mobility Inpatient Raw Score: 6  JH-HLM Goal: 2: Bed activities/Dependent transfer  JH-HLM Achieved: 2: Bed activities/Dependent transfer  JH-HLM Goal NOT achieved.  Continue with multidisciplinary rounding and encourage appropriate mobility to improve upon -[facility] goals.    Patient Centered Rounds: I performed bedside rounds with nursing staff today.  Discussions with Specialists or Other Care Team Provider: Critical care, General surgery    Education and Discussions with Family / Patient: Updated  (charge nurse at facility) at bedside.    Current Length of Stay: 8 day(s)  Current Patient Status: Inpatient   Discharge Plan: Anticipate discharge in 48-72 hrs to rehab facility.    Code Status: Level 1 - Full Code    Subjective:   Patient is nonverbal at baseline, so unable to get any history from her. Exam does not look changed from priorr week. Spoke to charge nurse from facility about to increase her dose of lantus to achieve proper glucose control and new feed rate,      Objective:     Vitals:   Temp (24hrs), Av.4 °F (38 °C), Min:99.8 °F (37.7 °C), Max:101.2 °F (38.4 °C)    Temp:  [99.8 °F (37.7 °C)-101.2 °F (38.4 °C)] 100.6 °F (38.1 °C)  HR:  [] 93  BP: (127-152)/(74-92) 149/92  SpO2:  [87 %-94 %] 92 %  Body mass index is 19.26 kg/m².     Input and Output Summary (last 24 hours):     Intake/Output Summary (Last 24 hours) at 2024 1114  Last data filed at 2024 0529  Gross per 24 hour   Intake 847 ml   Output 2388 ml   Net -1541 ml       Physical Exam:   Physical Exam  Vitals and nursing note reviewed.   Constitutional:       General: She is not in acute distress.     Appearance: She is well-developed.   HENT:      Head: Normocephalic and atraumatic.   Eyes:      Conjunctiva/sclera: Conjunctivae normal.   Cardiovascular:      Rate and Rhythm: Normal rate and regular rhythm.      Heart sounds: No murmur heard.  Pulmonary:      Effort: Pulmonary effort is normal. No respiratory distress.      Breath sounds: Normal breath sounds. No wheezing or rales.   Abdominal:      General: There is no distension.      Palpations: Abdomen is soft.       Tenderness: There is no abdominal tenderness. There is no guarding.   Musculoskeletal:         General: No swelling.      Cervical back: Neck supple.      Right lower leg: No edema.      Left lower leg: No edema.   Skin:     General: Skin is warm and dry.      Capillary Refill: Capillary refill takes less than 2 seconds.   Neurological:      Mental Status: She is alert.   Psychiatric:         Mood and Affect: Mood normal.        Additional Data:     Labs:  Results from last 7 days   Lab Units 09/01/24  0349   WBC Thousand/uL 7.52   HEMOGLOBIN g/dL 10.2*   HEMATOCRIT % 31.1*   PLATELETS Thousands/uL 275   SEGS PCT % 73   LYMPHO PCT % 17   MONO PCT % 6   EOS PCT % 3     Results from last 7 days   Lab Units 09/01/24  0349 08/31/24  0501   SODIUM mmol/L 138 139   POTASSIUM mmol/L 4.1 4.0   CHLORIDE mmol/L 99 104   CO2 mmol/L 31 31   BUN mg/dL 14 13   CREATININE mg/dL 0.95 0.82   ANION GAP mmol/L 8 4   CALCIUM mg/dL 10.0 9.5   ALBUMIN g/dL  --  3.2*   TOTAL BILIRUBIN mg/dL  --  0.26   ALK PHOS U/L  --  50   ALT U/L  --  4*   AST U/L  --  10*   GLUCOSE RANDOM mg/dL 235* 222*         Results from last 7 days   Lab Units 09/01/24  1107 09/01/24  0750 09/01/24  0536 09/01/24  0154 09/01/24  0019 09/01/24  0007 08/31/24  2225 08/31/24  1938 08/31/24  1613 08/31/24  1426 08/31/24  1203 08/31/24  1100   POC GLUCOSE mg/dl 335* 243* 294* 251* 332* 333* 296* 176* 123 141* 206* 261*         Results from last 7 days   Lab Units 09/01/24  0349 08/30/24  0512 08/30/24  0345 08/27/24  1151 08/27/24  0538 08/26/24  0451   LACTIC ACID mmol/L 1.1 2.2* 2.8* 1.5  --   --    PROCALCITONIN ng/ml  --   --  0.22  --  0.58* 0.62*       Lines/Drains:  Invasive Devices       Peripheral Intravenous Line  Duration             Peripheral IV 08/28/24 Left;Proximal;Ventral (anterior) Forearm 3 days    Peripheral IV 08/30/24 Distal;Right;Upper;Ventral (anterior) Arm 2 days    Peripheral IV 08/30/24 Proximal;Right;Ventral (anterior) Forearm 2 days     Peripheral IV 08/30/24 Right;Ventral (anterior) Forearm 2 days              Drain  Duration             Gastrostomy/Enterostomy  16 Fr. LUQ 5 days                          Imaging: Reviewed radiology reports from this admission including: chest xray, chest CT scan, and abdominal/pelvic CT    Recent Cultures (last 7 days):   Results from last 7 days   Lab Units 08/30/24  0523   BLOOD CULTURE  No Growth at 48 hrs.  No Growth at 48 hrs.       Last 24 Hours Medication List:   Current Facility-Administered Medications   Medication Dose Route Frequency Provider Last Rate    acetaminophen  650 mg Oral Q4H PRN Lisa Carty MD      ammonium lactate   Topical BID PRN Lisa Carty MD      ARIPiprazole  2 mg Per G Tube HS Lisa Carty MD      artificial tear   Both Eyes HS PRN Lisa Carty MD      vitamin C  1,000 mg Per G Tube Daily Lisa Carty MD      baclofen  10 mg Per G Tube TID Lisa Carty MD      calcium carbonate  750 mg Oral BID Lisa Carty MD      carbamide peroxide  2 drop Both Ears BID PRN Lisa Carty MD      carbidopa-levodopa  1 tablet Oral 4x Daily Lisa Carty MD      cholecalciferol  1,000 Units Per G Tube Daily Lisa Carty MD      citalopram  40 mg Per G Tube Daily Lisa Carty MD      dextromethorphan-guaiFENesin  10 mL Per G Tube Q4H PRN Lisa Carty MD      docosanol   Topical 5x Daily Lisa Carty MD      enoxaparin  40 mg Subcutaneous Daily Lisa Carty MD      Ferrous Sulfate  300 mg Oral BID AC Lisa Carty MD      fluticasone  1 spray Nasal Daily PRN Lisa Carty MD      hydrocortisone   Topical 4x Daily PRN Lisa Carty MD      insulin glargine  15 Units Subcutaneous HS Janel Corbin DO      insulin lispro  1-6 Units Subcutaneous Q6H [facility] Lisa Carty MD      Ketotifen Fumarate  1 drop Both Eyes Daily PRN Lisa Carty MD      levothyroxine  25 mcg Per G Tube QAM Lisa Carty MD      Loratadine  10 mg Oral Daily PRN Lisa Carty MD      LORazepam  0.5 mg Per G Tube HS Sonya Young MD      methenamine  hippurate  1 g Oral BID Lisa Carty MD      omeprazole (PRILOSEC) suspension 2 mg/mL  20 mg Oral Daily Lisa Carty MD      oxybutynin  5 mg Per G Tube TID Lisa Carty MD      polyethylene glycol  17 g Oral Daily Lisa Carty MD      pravastatin  40 mg Per G Tube Daily With Dinner Lisa Carty MD          Today, Patient Was Seen By: Janel Corbin DO    **Please Note: This note may have been constructed using a voice recognition system.**

## 2024-09-02 LAB
ANION GAP SERPL CALCULATED.3IONS-SCNC: 7 MMOL/L (ref 4–13)
BUN SERPL-MCNC: 23 MG/DL (ref 5–25)
CALCIUM SERPL-MCNC: 9.8 MG/DL (ref 8.4–10.2)
CHLORIDE SERPL-SCNC: 96 MMOL/L (ref 96–108)
CO2 SERPL-SCNC: 31 MMOL/L (ref 21–32)
CREAT SERPL-MCNC: 0.93 MG/DL (ref 0.6–1.3)
GFR SERPL CREATININE-BSD FRML MDRD: 66 ML/MIN/1.73SQ M
GLUCOSE SERPL-MCNC: 137 MG/DL (ref 65–140)
GLUCOSE SERPL-MCNC: 199 MG/DL (ref 65–140)
GLUCOSE SERPL-MCNC: 301 MG/DL (ref 65–140)
GLUCOSE SERPL-MCNC: 322 MG/DL (ref 65–140)
GLUCOSE SERPL-MCNC: 353 MG/DL (ref 65–140)
GLUCOSE SERPL-MCNC: 81 MG/DL (ref 65–140)
GLUCOSE SERPL-MCNC: 89 MG/DL (ref 65–140)
POTASSIUM SERPL-SCNC: 4.7 MMOL/L (ref 3.5–5.3)
PROCALCITONIN SERPL-MCNC: 0.12 NG/ML
SODIUM SERPL-SCNC: 134 MMOL/L (ref 135–147)

## 2024-09-02 PROCEDURE — 82948 REAGENT STRIP/BLOOD GLUCOSE: CPT

## 2024-09-02 PROCEDURE — 99232 SBSQ HOSP IP/OBS MODERATE 35: CPT | Performed by: HOSPITALIST

## 2024-09-02 PROCEDURE — 84145 PROCALCITONIN (PCT): CPT

## 2024-09-02 PROCEDURE — 80048 BASIC METABOLIC PNL TOTAL CA: CPT

## 2024-09-02 RX ORDER — DIVALPROEX SODIUM 125 MG/1
125 CAPSULE, COATED PELLETS ORAL EVERY 12 HOURS SCHEDULED
Status: DISCONTINUED | OUTPATIENT
Start: 2024-09-02 | End: 2024-09-04

## 2024-09-02 RX ORDER — DIVALPROEX SODIUM 125 MG/1
125 TABLET, DELAYED RELEASE ORAL EVERY 12 HOURS SCHEDULED
Status: DISCONTINUED | OUTPATIENT
Start: 2024-09-02 | End: 2024-09-02

## 2024-09-02 RX ORDER — INSULIN GLARGINE 100 [IU]/ML
20 INJECTION, SOLUTION SUBCUTANEOUS
Status: DISCONTINUED | OUTPATIENT
Start: 2024-09-02 | End: 2024-09-04

## 2024-09-02 RX ADMIN — ACETAMINOPHEN 650 MG: 650 SUSPENSION ORAL at 17:19

## 2024-09-02 RX ADMIN — OXYBUTYNIN CHLORIDE 5 MG: 5 TABLET ORAL at 22:11

## 2024-09-02 RX ADMIN — DOCOSANOL: 100 CREAM TOPICAL at 14:56

## 2024-09-02 RX ADMIN — DOCOSANOL: 100 CREAM TOPICAL at 17:02

## 2024-09-02 RX ADMIN — CALCIUM CARBONATE (ANTACID) CHEW TAB 500 MG 750 MG: 500 CHEW TAB at 22:11

## 2024-09-02 RX ADMIN — OXYBUTYNIN CHLORIDE 5 MG: 5 TABLET ORAL at 16:52

## 2024-09-02 RX ADMIN — INSULIN LISPRO 6 UNITS: 100 INJECTION, SOLUTION INTRAVENOUS; SUBCUTANEOUS at 23:00

## 2024-09-02 RX ADMIN — ENOXAPARIN SODIUM 40 MG: 40 INJECTION SUBCUTANEOUS at 09:22

## 2024-09-02 RX ADMIN — CARBIDOPA AND LEVODOPA 1 TABLET: 25; 100 TABLET ORAL at 12:34

## 2024-09-02 RX ADMIN — ARIPIPRAZOLE 2 MG: 2 TABLET ORAL at 22:33

## 2024-09-02 RX ADMIN — ACETAMINOPHEN 650 MG: 650 SUSPENSION ORAL at 12:34

## 2024-09-02 RX ADMIN — LEVOTHYROXINE SODIUM 25 MCG: 25 TABLET ORAL at 09:22

## 2024-09-02 RX ADMIN — Medication 20 MG: at 09:24

## 2024-09-02 RX ADMIN — CARBIDOPA AND LEVODOPA 1 TABLET: 25; 100 TABLET ORAL at 09:22

## 2024-09-02 RX ADMIN — INSULIN LISPRO 3 UNITS: 100 INJECTION, SOLUTION INTRAVENOUS; SUBCUTANEOUS at 00:03

## 2024-09-02 RX ADMIN — CARBIDOPA AND LEVODOPA 1 TABLET: 25; 100 TABLET ORAL at 22:10

## 2024-09-02 RX ADMIN — CARBIDOPA AND LEVODOPA 1 TABLET: 25; 100 TABLET ORAL at 17:02

## 2024-09-02 RX ADMIN — PRAVASTATIN SODIUM 40 MG: 40 TABLET ORAL at 16:52

## 2024-09-02 RX ADMIN — METHENAMINE HIPPURATE 1 G: 1 TABLET ORAL at 09:23

## 2024-09-02 RX ADMIN — DOCOSANOL: 100 CREAM TOPICAL at 12:35

## 2024-09-02 RX ADMIN — METHENAMINE HIPPURATE 1 G: 1 TABLET ORAL at 17:02

## 2024-09-02 RX ADMIN — DOCOSANOL: 100 CREAM TOPICAL at 09:24

## 2024-09-02 RX ADMIN — BACLOFEN 10 MG: 10 TABLET ORAL at 16:52

## 2024-09-02 RX ADMIN — LORAZEPAM 0.5 MG: 0.5 TABLET ORAL at 22:10

## 2024-09-02 RX ADMIN — INSULIN GLARGINE 20 UNITS: 100 INJECTION, SOLUTION SUBCUTANEOUS at 23:00

## 2024-09-02 RX ADMIN — BACLOFEN 10 MG: 10 TABLET ORAL at 22:11

## 2024-09-02 RX ADMIN — BACLOFEN 10 MG: 10 TABLET ORAL at 09:22

## 2024-09-02 RX ADMIN — FERROUS SULFATE 300 MG: 300 SOLUTION ORAL at 16:52

## 2024-09-02 RX ADMIN — CALCIUM CARBONATE (ANTACID) CHEW TAB 500 MG 750 MG: 500 CHEW TAB at 09:22

## 2024-09-02 RX ADMIN — CITALOPRAM HYDROBROMIDE 40 MG: 20 TABLET ORAL at 09:22

## 2024-09-02 RX ADMIN — DIVALPROEX SODIUM 125 MG: 125 CAPSULE, COATED PELLETS ORAL at 22:33

## 2024-09-02 RX ADMIN — OXYBUTYNIN CHLORIDE 5 MG: 5 TABLET ORAL at 09:22

## 2024-09-02 RX ADMIN — FERROUS SULFATE 300 MG: 300 SOLUTION ORAL at 09:22

## 2024-09-02 RX ADMIN — GUAIFENESIN AND DEXTROMETHORPHAN 10 ML: 100; 10 SYRUP ORAL at 12:34

## 2024-09-02 RX ADMIN — Medication 1000 UNITS: at 09:22

## 2024-09-02 RX ADMIN — POLYETHYLENE GLYCOL 3350 17 G: 17 POWDER, FOR SOLUTION ORAL at 09:22

## 2024-09-02 RX ADMIN — DOCOSANOL: 100 CREAM TOPICAL at 22:56

## 2024-09-02 RX ADMIN — OXYCODONE HYDROCHLORIDE AND ACETAMINOPHEN 1000 MG: 500 TABLET ORAL at 09:22

## 2024-09-02 RX ADMIN — INSULIN LISPRO 5 UNITS: 100 INJECTION, SOLUTION INTRAVENOUS; SUBCUTANEOUS at 05:59

## 2024-09-02 NOTE — ASSESSMENT & PLAN NOTE
CT neck no organic obstructions in the mediastinum. Pt has had poor PO intake, malnutrition requiring gastrostomy tube placement on 08/26. Per group home, pt attends day program which cannot manage tube feeds so requesting her to receive it from 6pm to 6am. Settings adjusted to take this into consideration per nutrition. CXR ordered 9/2 d/t pt's fever that has now resolved, suspecting post-op atelectasis vs aspiration PNA. Procalcitonin unremarkable.     Plan:  Per Nutrition: Recommend trial cyclic feeds of Glucerna 1.2 @ 80 ml/hr X 12 hrs (Run 6 pm-6am). This provides 960 ml total volume, 1152 kcals, 58 g protein, and 109 g CHO. If blood sugars remain elevated on cyclic feeds, switch to continuous feeds of Glucerna 1.2 @ 40 ml/hr X 24 hrs to see if lower volume/more time improves blood sugars. Re-consult RD as needed.   Continue Glucerna 1.2 as tolerated per nutrition   Increase by 10 cc/hr q4h until goal rate achieved  60 mL water flush before & after TF cycle, and additional 500 mL water daily that can be divided amongst med passes  Per SLP: can introduce pleasure feeds or puree & HTL as tolerated  CXR pending

## 2024-09-02 NOTE — ASSESSMENT & PLAN NOTE
Lab Results   Component Value Date    HGBA1C 6.2 (H) 08/13/2024     Recent Labs     09/02/24  0511 09/02/24  0703 09/02/24  1101 09/02/24  1224   POCGLU 322* 199* 81 89     Blood Sugar Average: Last 72 hrs:  (P) 239.6416258397196123    PMHx of diabetes. Pt having hyperglycemic episodes in the setting of tube feeds, possibly related to refeeding syndrome although pt has not had hypokalemia, hypomagnesemia, hypophosphatemia. Required insulin drip on 8/29 as BG>500, discontinued when BG<200. However, pt became hyperglycemic overnight 8/30 in the setting of tube feeds, MAP ~54 w minimal response to fluids and albumin, transferred to ICU. Per review of crit care notes, pt was briefly on Levophed; low suspicion of infectious etiology as pt has not had leukocytosis and has been afebrile, imaging unremarkable. Pt stable, nutrition switched feed from Jevity 1.5 to Glucerna 1.2, transferred back to med/surg on 8/31. Continue sliding scale, algorithm 3. Pt tolerated lantus 10 units and now 15 units well; however, there is still room for improvement. Initiating lantus 20 units tonight.     Plan:  - Increasing Lantus to 20u q HS, first dose tonight 9/2  - Continue insulin sliding scale, algorithm 3  - BG checks q6h  - Repeat BMP, Magnesium

## 2024-09-02 NOTE — PROGRESS NOTES
[facility]  Progress Note  Name: Terrie Alicea I  MRN: [id number]  Unit/Bed#: S -01 I Date of Admission: 8/24/2024   Date of Service: 9/2/2024 I Hospital Day: 9    Assessment & Plan   * Dysphagia  Assessment & Plan  CT neck no organic obstructions in the mediastinum. Pt has had poor PO intake, malnutrition requiring gastrostomy tube placement on 08/26. Per group home, pt attends day program which cannot manage tube feeds so requesting her to receive it from 6pm to 6am. Settings adjusted to take this into consideration per nutrition. CXR ordered 9/2 d/t pt's fever that has now resolved, suspecting post-op atelectasis vs aspiration PNA. Procalcitonin unremarkable.     Plan:  Per Nutrition: Recommend trial cyclic feeds of Glucerna 1.2 @ 80 ml/hr X 12 hrs (Run 6 pm-6am). This provides 960 ml total volume, 1152 kcals, 58 g protein, and 109 g CHO. If blood sugars remain elevated on cyclic feeds, switch to continuous feeds of Glucerna 1.2 @ 40 ml/hr X 24 hrs to see if lower volume/more time improves blood sugars. Re-consult RD as needed.   Continue Glucerna 1.2 as tolerated per nutrition   Increase by 10 cc/hr q4h until goal rate achieved  60 mL water flush before & after TF cycle, and additional 500 mL water daily that can be divided amongst med passes  Per SLP: can introduce pleasure feeds or puree & HTL as tolerated  CXR pending    DM (diabetes mellitus) ([facility])  Assessment & Plan  Lab Results   Component Value Date    HGBA1C 6.2 (H) 08/13/2024     Recent Labs     09/02/24  0511 09/02/24  0703 09/02/24  1101 09/02/24  1224   POCGLU 322* 199* 81 89     Blood Sugar Average: Last 72 hrs:  (P) 239.3522673283903568    PMHx of diabetes. Pt having hyperglycemic episodes in the setting of tube feeds, possibly related to refeeding syndrome although pt has not had hypokalemia, hypomagnesemia, hypophosphatemia. Required insulin drip on 8/29 as BG>500, discontinued when BG<200. However, pt became  hyperglycemic overnight 8/30 in the setting of tube feeds, MAP ~54 w minimal response to fluids and albumin, transferred to ICU. Per review of crit care notes, pt was briefly on Levophed; low suspicion of infectious etiology as pt has not had leukocytosis and has been afebrile, imaging unremarkable. Pt stable, nutrition switched feed from Jevity 1.5 to Glucerna 1.2, transferred back to med/surg on 8/31. Continue sliding scale, algorithm 3. Pt tolerated lantus 10 units and now 15 units well; however, there is still room for improvement. Initiating lantus 20 units tonight.     Plan:  - Increasing Lantus to 20u q HS, first dose tonight 9/2  - Continue insulin sliding scale, algorithm 3  - BG checks q6h  - Repeat BMP, Magnesium    Mood disturbances  Assessment & Plan  Continue home meds: Abilify 2 mg, Citalopram 40 mg.  Pt previously on trazodone 100 mg q HS which was discontinued on 8/30 while in the ICU d/t contributions to sedation.        Parkinson's disease  Assessment & Plan  History of mood disturbances, previously on neuroleptics  Tardive dyskinesia on exam    Sinemet  qid. Can defer changes to outpatient neurology.     Incontinence  Assessment & Plan  Continue oxybutynin 5 mg TID    UTI (urinary tract infection)  Assessment & Plan  Previous culture 8/20-grew E. coli, pansensitive  CT abdomen pelvis demonstrates wall thickening of urinary bladder  Concurrent aspiration pneumonia  ABX course complete      Cerebral palsy (HCC)  Assessment & Plan  Continue baclofen 10 mg TID         VTE Pharmacologic Prophylaxis: VTE Score: 7 High Risk (Score >/= 5) - Pharmacological DVT Prophylaxis Ordered: enoxaparin (Lovenox). Sequential Compression Devices Ordered.    Mobility:   Basic Mobility Inpatient Raw Score: 6  JH-HLM Goal: 2: Bed activities/Dependent transfer  JH-HLM Achieved: 2: Bed activities/Dependent transfer  JH-HLM Goal achieved. Continue to encourage appropriate mobility.    Patient Centered Rounds: I  performed bedside rounds with nursing staff today.  Discussions with Specialists or Other Care Team Provider:     Education and Discussions with Family / Patient:  defer call to group home tomorrow as today is a holiday.     Current Length of Stay: 9 day(s)  Current Patient Status: Inpatient   Discharge Plan:  Anticipate discharge in >48h until BG stabilization is achieved and insulin regimen is optimized     Code Status: Level 1 - Full Code    Subjective:   Patient is seen and examined at bedside. Pt does not appear to be in acute distress; she is resting in bed, intermittently opening her eyes in response to verbal stimuli.     Objective:     Vitals:   Temp (24hrs), Av.4 °F (37.4 °C), Min:97.7 °F (36.5 °C), Max:101.1 °F (38.4 °C)    Temp:  [97.7 °F (36.5 °C)-101.1 °F (38.4 °C)] 101.1 °F (38.4 °C)  HR:  [] 105  BP: ()/(57-88) 95/68  SpO2:  [92 %-95 %] 92 %  Body mass index is 19.26 kg/m².     Input and Output Summary (last 24 hours):     Intake/Output Summary (Last 24 hours) at 2024 1250  Last data filed at 2024 0900  Gross per 24 hour   Intake 1673 ml   Output 425 ml   Net 1248 ml       Physical Exam:   Physical Exam  Vitals reviewed.   Constitutional:       General: She is not in acute distress.     Appearance: She is underweight.   HENT:      Right Ear: External ear normal.      Left Ear: External ear normal.   Eyes:      Conjunctiva/sclera: Conjunctivae normal.   Cardiovascular:      Rate and Rhythm: Regular rhythm. Tachycardia present.   Pulmonary:      Effort: No respiratory distress.      Breath sounds: Normal breath sounds.   Abdominal:      Tenderness: There is no abdominal tenderness. There is no guarding.      Comments: G-tube in place   Musculoskeletal:      Comments: R-arm deformity, baseline   Skin:     Capillary Refill: Capillary refill takes less than 2 seconds.   Neurological:      Mental Status: Mental status is at baseline.          Additional Data:     Labs:  Results from  last 7 days   Lab Units 09/01/24  0349   WBC Thousand/uL 7.52   HEMOGLOBIN g/dL 10.2*   HEMATOCRIT % 31.1*   PLATELETS Thousands/uL 275   SEGS PCT % 73   LYMPHO PCT % 17   MONO PCT % 6   EOS PCT % 3     Results from last 7 days   Lab Units 09/02/24  0456 09/01/24  0349 08/31/24  0501   SODIUM mmol/L 134*   < > 139   POTASSIUM mmol/L 4.7   < > 4.0   CHLORIDE mmol/L 96   < > 104   CO2 mmol/L 31   < > 31   BUN mg/dL 23   < > 13   CREATININE mg/dL 0.93   < > 0.82   ANION GAP mmol/L 7   < > 4   CALCIUM mg/dL 9.8   < > 9.5   ALBUMIN g/dL  --   --  3.2*   TOTAL BILIRUBIN mg/dL  --   --  0.26   ALK PHOS U/L  --   --  50   ALT U/L  --   --  4*   AST U/L  --   --  10*   GLUCOSE RANDOM mg/dL 301*   < > 222*    < > = values in this interval not displayed.         Results from last 7 days   Lab Units 09/02/24  1224 09/02/24  1101 09/02/24  0703 09/02/24  0511 09/01/24  2304 09/01/24  2031 09/01/24  1539 09/01/24  1443 09/01/24  1107 09/01/24  0750 09/01/24  0536 09/01/24  0154   POC GLUCOSE mg/dl 89 81 199* 322* 241* 167* 220* 247* 335* 243* 294* 251*         Results from last 7 days   Lab Units 09/02/24  0456 09/01/24  0349 08/30/24  0512 08/30/24  0345 08/27/24  1151 08/27/24  0538   LACTIC ACID mmol/L  --  1.1 2.2* 2.8* 1.5  --    PROCALCITONIN ng/ml 0.12  --   --  0.22  --  0.58*       Lines/Drains:  Invasive Devices       Peripheral Intravenous Line  Duration             Peripheral IV 08/28/24 Left;Proximal;Ventral (anterior) Forearm 4 days    Peripheral IV 08/30/24 Distal;Right;Upper;Ventral (anterior) Arm 3 days    Peripheral IV 08/30/24 Proximal;Right;Ventral (anterior) Forearm 3 days    Peripheral IV 08/30/24 Right;Ventral (anterior) Forearm 3 days              Drain  Duration             Gastrostomy/Enterostomy  16 Fr. LUQ 6 days                          Imaging: Reviewed radiology reports from this admission including: chest CT scan and CT neck    Recent Cultures (last 7 days):   Results from last 7 days   Lab Units  08/30/24  0523   BLOOD CULTURE  No Growth at 72 hrs.  No Growth at 72 hrs.       Last 24 Hours Medication List:   Current Facility-Administered Medications   Medication Dose Route Frequency Provider Last Rate    acetaminophen  650 mg Oral Q4H PRN Lisa Carty MD      ammonium lactate   Topical BID PRN Lisa Carty MD      ARIPiprazole  2 mg Per G Tube HS Lisa Carty MD      artificial tear   Both Eyes HS PRN Lisa Carty MD      vitamin C  1,000 mg Per G Tube Daily Lisa Carty MD      baclofen  10 mg Per G Tube TID Lisa Carty MD      calcium carbonate  750 mg Oral BID Lisa Carty MD      carbamide peroxide  2 drop Both Ears BID PRN Lisa Carty MD      carbidopa-levodopa  1 tablet Oral 4x Daily Lisa Carty MD      cholecalciferol  1,000 Units Per G Tube Daily Lisa Carty MD      citalopram  40 mg Per G Tube Daily Lisa Carty MD      dextromethorphan-guaiFENesin  10 mL Per G Tube Q4H PRN Lisa Carty MD      docosanol   Topical 5x Daily Lisa Carty MD      enoxaparin  40 mg Subcutaneous Daily Lisa Carty MD      Ferrous Sulfate  300 mg Oral BID AC Lisa Carty MD      fluticasone  1 spray Nasal Daily PRN Lisa Carty MD      hydrocortisone   Topical 4x Daily PRN Lisa Carty MD      insulin glargine  20 Units Subcutaneous HS Silvana Mike DO      insulin lispro  1-6 Units Subcutaneous Q6H [facility] Lisa Carty MD      Ketotifen Fumarate  1 drop Both Eyes Daily PRN Lisa Carty MD      levothyroxine  25 mcg Per G Tube QAM Lisa Carty MD      Loratadine  10 mg Oral Daily PRN Lisa Carty MD      LORazepam  0.5 mg Per G Tube HS Sonya Young MD      methenamine hippurate  1 g Oral BID Lisa Carty MD      omeprazole (PRILOSEC) suspension 2 mg/mL  20 mg Oral Daily Lisa Carty MD      oxybutynin  5 mg Per G Tube TID Lisa Carty MD      polyethylene glycol  17 g Oral Daily Lisa Carty MD      pravastatin  40 mg Per G Tube Daily With Dinner Lisa Carty MD          Today, Patient Was Seen By: Silvana Mike DO  PGY-1    **Please Note:  This note may have been constructed using a voice recognition system.**

## 2024-09-02 NOTE — ASSESSMENT & PLAN NOTE
- Continue home meds: Abilify 2 mg, Citalopram 40 mg.  - Pt previously on trazodone 100 mg q HS which was discontinued on 8/30 while in the ICU d/t contributions to sedation.    - Pt was previously on Depakote sprinkles (125 mg, 3 capsules BID) discontinued at previous admission from 8/23. [facility] requested to restart it during this current hospitalization. Re-introduced Depakote sprinkles 9/2 at 125 mg q12h. Will uptitrate as necessary.

## 2024-09-03 ENCOUNTER — APPOINTMENT (INPATIENT)
Dept: CT IMAGING | Facility: HOSPITAL | Age: 64
DRG: 391 | End: 2024-09-03
Payer: MEDICARE

## 2024-09-03 ENCOUNTER — TELEPHONE (OUTPATIENT)
Dept: CT IMAGING | Facility: HOSPITAL | Age: 64
End: 2024-09-03

## 2024-09-03 PROBLEM — R50.9 FEVER OF UNKNOWN ORIGIN: Status: ACTIVE | Noted: 2024-09-03

## 2024-09-03 LAB
ANION GAP SERPL CALCULATED.3IONS-SCNC: 6 MMOL/L (ref 4–13)
BASOPHILS # BLD AUTO: 0.06 THOUSANDS/ΜL (ref 0–0.1)
BASOPHILS NFR BLD AUTO: 1 % (ref 0–1)
BILIRUB UR QL STRIP: NEGATIVE
BUN SERPL-MCNC: 26 MG/DL (ref 5–25)
CALCIUM SERPL-MCNC: 10 MG/DL (ref 8.4–10.2)
CHLORIDE SERPL-SCNC: 95 MMOL/L (ref 96–108)
CLARITY UR: CLEAR
CO2 SERPL-SCNC: 36 MMOL/L (ref 21–32)
COLOR UR: NORMAL
CREAT SERPL-MCNC: 1.04 MG/DL (ref 0.6–1.3)
EOSINOPHIL # BLD AUTO: 0.41 THOUSAND/ΜL (ref 0–0.61)
EOSINOPHIL NFR BLD AUTO: 5 % (ref 0–6)
ERYTHROCYTE [DISTWIDTH] IN BLOOD BY AUTOMATED COUNT: 13.9 % (ref 11.6–15.1)
FLUAV RNA RESP QL NAA+PROBE: NEGATIVE
FLUBV RNA RESP QL NAA+PROBE: NEGATIVE
GFR SERPL CREATININE-BSD FRML MDRD: 57 ML/MIN/1.73SQ M
GLUCOSE SERPL-MCNC: 106 MG/DL (ref 65–140)
GLUCOSE SERPL-MCNC: 119 MG/DL (ref 65–140)
GLUCOSE SERPL-MCNC: 172 MG/DL (ref 65–140)
GLUCOSE SERPL-MCNC: 191 MG/DL (ref 65–140)
GLUCOSE SERPL-MCNC: 72 MG/DL (ref 65–140)
GLUCOSE SERPL-MCNC: 98 MG/DL (ref 65–140)
GLUCOSE UR STRIP-MCNC: NEGATIVE MG/DL
HCT VFR BLD AUTO: 33.3 % (ref 34.8–46.1)
HGB BLD-MCNC: 10.8 G/DL (ref 11.5–15.4)
HGB UR QL STRIP.AUTO: NEGATIVE
IMM GRANULOCYTES # BLD AUTO: 0.04 THOUSAND/UL (ref 0–0.2)
IMM GRANULOCYTES NFR BLD AUTO: 1 % (ref 0–2)
KETONES UR STRIP-MCNC: NEGATIVE MG/DL
LACTATE SERPL-SCNC: 1.8 MMOL/L (ref 0.5–2)
LEUKOCYTE ESTERASE UR QL STRIP: NEGATIVE
LYMPHOCYTES # BLD AUTO: 2.3 THOUSANDS/ΜL (ref 0.6–4.47)
LYMPHOCYTES NFR BLD AUTO: 27 % (ref 14–44)
MCH RBC QN AUTO: 32.6 PG (ref 26.8–34.3)
MCHC RBC AUTO-ENTMCNC: 32.4 G/DL (ref 31.4–37.4)
MCV RBC AUTO: 101 FL (ref 82–98)
MONOCYTES # BLD AUTO: 0.77 THOUSAND/ΜL (ref 0.17–1.22)
MONOCYTES NFR BLD AUTO: 9 % (ref 4–12)
NEUTROPHILS # BLD AUTO: 4.96 THOUSANDS/ΜL (ref 1.85–7.62)
NEUTS SEG NFR BLD AUTO: 57 % (ref 43–75)
NITRITE UR QL STRIP: NEGATIVE
NRBC BLD AUTO-RTO: 0 /100 WBCS
PH UR STRIP.AUTO: 7.5 [PH]
PLATELET # BLD AUTO: 255 THOUSANDS/UL (ref 149–390)
PMV BLD AUTO: 11.3 FL (ref 8.9–12.7)
POTASSIUM SERPL-SCNC: 4.8 MMOL/L (ref 3.5–5.3)
PROT UR STRIP-MCNC: NEGATIVE MG/DL
RBC # BLD AUTO: 3.31 MILLION/UL (ref 3.81–5.12)
RSV RNA RESP QL NAA+PROBE: NEGATIVE
SARS-COV-2 RNA RESP QL NAA+PROBE: NEGATIVE
SODIUM SERPL-SCNC: 137 MMOL/L (ref 135–147)
SP GR UR STRIP.AUTO: 1.01 (ref 1–1.03)
UROBILINOGEN UR STRIP-ACNC: <2 MG/DL
WBC # BLD AUTO: 8.54 THOUSAND/UL (ref 4.31–10.16)

## 2024-09-03 PROCEDURE — 87040 BLOOD CULTURE FOR BACTERIA: CPT

## 2024-09-03 PROCEDURE — 85025 COMPLETE CBC W/AUTO DIFF WBC: CPT

## 2024-09-03 PROCEDURE — 99232 SBSQ HOSP IP/OBS MODERATE 35: CPT | Performed by: INTERNAL MEDICINE

## 2024-09-03 PROCEDURE — 82948 REAGENT STRIP/BLOOD GLUCOSE: CPT

## 2024-09-03 PROCEDURE — 80048 BASIC METABOLIC PNL TOTAL CA: CPT

## 2024-09-03 PROCEDURE — 0241U HB NFCT DS VIR RESP RNA 4 TRGT: CPT

## 2024-09-03 PROCEDURE — 81003 URINALYSIS AUTO W/O SCOPE: CPT

## 2024-09-03 PROCEDURE — 83605 ASSAY OF LACTIC ACID: CPT

## 2024-09-03 PROCEDURE — 74177 CT ABD & PELVIS W/CONTRAST: CPT

## 2024-09-03 PROCEDURE — 71260 CT THORAX DX C+: CPT

## 2024-09-03 RX ORDER — SODIUM CHLORIDE 9 MG/ML
75 INJECTION, SOLUTION INTRAVENOUS CONTINUOUS
Status: DISCONTINUED | OUTPATIENT
Start: 2024-09-03 | End: 2024-09-04

## 2024-09-03 RX ORDER — SODIUM CHLORIDE, SODIUM GLUCONATE, SODIUM ACETATE, POTASSIUM CHLORIDE, MAGNESIUM CHLORIDE, SODIUM PHOSPHATE, DIBASIC, AND POTASSIUM PHOSPHATE .53; .5; .37; .037; .03; .012; .00082 G/100ML; G/100ML; G/100ML; G/100ML; G/100ML; G/100ML; G/100ML
100 INJECTION, SOLUTION INTRAVENOUS CONTINUOUS
Status: DISPENSED | OUTPATIENT
Start: 2024-09-03 | End: 2024-09-03

## 2024-09-03 RX ORDER — SODIUM CHLORIDE, SODIUM GLUCONATE, SODIUM ACETATE, POTASSIUM CHLORIDE, MAGNESIUM CHLORIDE, SODIUM PHOSPHATE, DIBASIC, AND POTASSIUM PHOSPHATE .53; .5; .37; .037; .03; .012; .00082 G/100ML; G/100ML; G/100ML; G/100ML; G/100ML; G/100ML; G/100ML
500 INJECTION, SOLUTION INTRAVENOUS ONCE
Status: COMPLETED | OUTPATIENT
Start: 2024-09-03 | End: 2024-09-03

## 2024-09-03 RX ADMIN — Medication 1000 UNITS: at 10:39

## 2024-09-03 RX ADMIN — ENOXAPARIN SODIUM 40 MG: 40 INJECTION SUBCUTANEOUS at 10:39

## 2024-09-03 RX ADMIN — INSULIN LISPRO 2 UNITS: 100 INJECTION, SOLUTION INTRAVENOUS; SUBCUTANEOUS at 05:54

## 2024-09-03 RX ADMIN — CALCIUM CARBONATE (ANTACID) CHEW TAB 500 MG 750 MG: 500 CHEW TAB at 10:39

## 2024-09-03 RX ADMIN — SODIUM CHLORIDE 75 ML/HR: 0.9 INJECTION, SOLUTION INTRAVENOUS at 16:10

## 2024-09-03 RX ADMIN — DIVALPROEX SODIUM 125 MG: 125 CAPSULE, COATED PELLETS ORAL at 22:27

## 2024-09-03 RX ADMIN — CARBIDOPA AND LEVODOPA 1 TABLET: 25; 100 TABLET ORAL at 13:54

## 2024-09-03 RX ADMIN — OXYBUTYNIN CHLORIDE 5 MG: 5 TABLET ORAL at 17:53

## 2024-09-03 RX ADMIN — DOCOSANOL: 100 CREAM TOPICAL at 22:27

## 2024-09-03 RX ADMIN — IOHEXOL 100 ML: 350 INJECTION, SOLUTION INTRAVENOUS at 22:08

## 2024-09-03 RX ADMIN — SODIUM CHLORIDE, SODIUM GLUCONATE, SODIUM ACETATE, POTASSIUM CHLORIDE, MAGNESIUM CHLORIDE, SODIUM PHOSPHATE, DIBASIC, AND POTASSIUM PHOSPHATE 500 ML: .53; .5; .37; .037; .03; .012; .00082 INJECTION, SOLUTION INTRAVENOUS at 02:53

## 2024-09-03 RX ADMIN — FERROUS SULFATE 300 MG: 300 SOLUTION ORAL at 17:54

## 2024-09-03 RX ADMIN — FERROUS SULFATE 300 MG: 300 SOLUTION ORAL at 10:39

## 2024-09-03 RX ADMIN — BACLOFEN 10 MG: 10 TABLET ORAL at 17:54

## 2024-09-03 RX ADMIN — METHENAMINE HIPPURATE 1 G: 1 TABLET ORAL at 17:54

## 2024-09-03 RX ADMIN — OXYCODONE HYDROCHLORIDE AND ACETAMINOPHEN 1000 MG: 500 TABLET ORAL at 10:39

## 2024-09-03 RX ADMIN — ARIPIPRAZOLE 2 MG: 2 TABLET ORAL at 22:27

## 2024-09-03 RX ADMIN — OXYBUTYNIN CHLORIDE 5 MG: 5 TABLET ORAL at 10:39

## 2024-09-03 RX ADMIN — SODIUM CHLORIDE, SODIUM GLUCONATE, SODIUM ACETATE, POTASSIUM CHLORIDE, MAGNESIUM CHLORIDE, SODIUM PHOSPHATE, DIBASIC, AND POTASSIUM PHOSPHATE 100 ML/HR: .53; .5; .37; .037; .03; .012; .00082 INJECTION, SOLUTION INTRAVENOUS at 04:14

## 2024-09-03 RX ADMIN — POLYETHYLENE GLYCOL 3350 17 G: 17 POWDER, FOR SOLUTION ORAL at 10:39

## 2024-09-03 RX ADMIN — PRAVASTATIN SODIUM 40 MG: 40 TABLET ORAL at 17:54

## 2024-09-03 RX ADMIN — INSULIN GLARGINE 20 UNITS: 100 INJECTION, SOLUTION SUBCUTANEOUS at 22:44

## 2024-09-03 RX ADMIN — DOCOSANOL: 100 CREAM TOPICAL at 15:13

## 2024-09-03 RX ADMIN — DIVALPROEX SODIUM 125 MG: 125 CAPSULE, COATED PELLETS ORAL at 10:47

## 2024-09-03 RX ADMIN — METHENAMINE HIPPURATE 1 G: 1 TABLET ORAL at 10:46

## 2024-09-03 RX ADMIN — DOCOSANOL: 100 CREAM TOPICAL at 18:02

## 2024-09-03 RX ADMIN — OXYBUTYNIN CHLORIDE 5 MG: 5 TABLET ORAL at 22:28

## 2024-09-03 RX ADMIN — BACLOFEN 10 MG: 10 TABLET ORAL at 22:28

## 2024-09-03 RX ADMIN — LORAZEPAM 0.5 MG: 0.5 TABLET ORAL at 22:28

## 2024-09-03 RX ADMIN — Medication 20 MG: at 13:54

## 2024-09-03 RX ADMIN — LEVOTHYROXINE SODIUM 25 MCG: 25 TABLET ORAL at 10:39

## 2024-09-03 RX ADMIN — Medication 630 MG: at 00:38

## 2024-09-03 RX ADMIN — CARBIDOPA AND LEVODOPA 1 TABLET: 25; 100 TABLET ORAL at 17:54

## 2024-09-03 RX ADMIN — CARBIDOPA AND LEVODOPA 1 TABLET: 25; 100 TABLET ORAL at 22:27

## 2024-09-03 RX ADMIN — CITALOPRAM HYDROBROMIDE 40 MG: 20 TABLET ORAL at 10:39

## 2024-09-03 RX ADMIN — BACLOFEN 10 MG: 10 TABLET ORAL at 10:39

## 2024-09-03 RX ADMIN — DOCOSANOL: 100 CREAM TOPICAL at 12:16

## 2024-09-03 RX ADMIN — CARBIDOPA AND LEVODOPA 1 TABLET: 25; 100 TABLET ORAL at 10:39

## 2024-09-03 RX ADMIN — CALCIUM CARBONATE (ANTACID) CHEW TAB 500 MG 750 MG: 500 CHEW TAB at 22:27

## 2024-09-03 NOTE — QUICK NOTE
Pt persistently tachycardic and exhibiting continued fever despite tylenol and ice. Pt evaluated at bedside and no apparent cause identified on exam, she has clear lungs, active bowels, no apparent pain/tenderness. EKG is sinus. She does exhibit some shivering. Will order for repeat blood cultures, CBC, BMP, lactate, and add Isolyte 500cc bolus with 100cc/h x7h.

## 2024-09-03 NOTE — ASSESSMENT & PLAN NOTE
Lab Results   Component Value Date    HGBA1C 6.2 (H) 08/13/2024     Recent Labs     09/06/24  2035 09/06/24  2343 09/07/24  0630 09/07/24  1054   POCGLU 90 127 138 90     Blood Sugar Average: Last 72 hrs:  (P) [id number]    PMHx of diabetes. Pt having hyperglycemic episodes in the setting of tube feeds, possibly related to refeeding syndrome although pt has not had hypokalemia, hypomagnesemia, hypophosphatemia. Required insulin drip on 8/29 as BG>500, discontinued when BG<200. However, pt became hyperglycemic overnight 8/30 in the setting of tube feeds, MAP ~54 w minimal response to fluids and albumin, transferred to ICU. Per review of crit care notes, pt was briefly on Levophed; low suspicion of infectious etiology as pt has not had leukocytosis and has been afebrile, imaging unremarkable. Pt stable, nutrition switched feed from Jevity 1.5 to Glucerna 1.2, transferred back to med/surg on 8/31. Pt noted to be hyperglycemic, could be attributable to underlying infectious process as well. Pt was doing well on current insulin regimen, however today pt noted to have BG 52 in the afternoon and was given D5 bolus.    Plan:  - Decreased Lantus to 15 u qHS d/t hypoglycemia, blood sugars are in better range, around 130s over today, no hypoglycemic episodes  - Continue insulin sliding scale, algorithm 3  - BG checks q6h  - Repeat CMP

## 2024-09-03 NOTE — ASSESSMENT & PLAN NOTE
History of mood disturbances, previously on neuroleptics. Tardive dyskinesia on exam.    Sinemet  qid. Can defer changes to outpatient neurology.

## 2024-09-03 NOTE — ASSESSMENT & PLAN NOTE
"On 9/1 pt noted to be spiking fevers intermittently. Tmax recorded as 102.4F on 9/3. CXR 9/1 was ordered to r/o aspiration PNA vs atelectasis; showed no acute cardiopulmonary process, although did reveal dilated bowel loops in LUQ. However, pt is passing flatus and having bowel movements. CTAP 8/30 showed postoperative air compatible with open g-tube placement, no evidence of perforation. Repeat skin exam 9/3 without any acutely infected ulcers, disruption of skin integrity, injuries, tenderness. Site of g-tube is healing appropriately. Pt is unable to participate in incentive spirometry for atelectasis prophylaxis. COVID/Flu/RSV and UA negative 9/3. Blood cultures negative. Procalcitonin & lactic acid unremarkable, no leukocytosis. Per report from CT chest, abdomen, pelvis w contrast report 9/3 shows new opacification of \"R lower lobe bronchus, R atelectasis, w sequela of aspiration in R lower lobe. Initiated IV Rocephin 1 g on 9/4. Pt afebrile, no tachycardia, no tachypnea.      Plan:  - Continue IV Rocephin 1 g. Can consider switching to PO Keflex upon discharge.  - Symptomatic relief w IVPB Ofirmev 630 q8h, and ice packs  - VS q4h  "

## 2024-09-03 NOTE — ASSESSMENT & PLAN NOTE
On 9/1 pt noted to be spiking fevers intermittently. Tmax recorded as 101.6F on 9/2. CXR was ordered to r/o aspiration PNA vs atelectasis; showed no acute cardiopulmonary process on 09/02 although did reveal dilated bowel loops in LUQ. However, pt is passing flatus and having bowel movements. CTAP 8/30 showed postoperative air compatible with open g-tube placement, no evidence of perforation. Today, repeat full body examination was performed again and pt does not have any evidence of acutely infected ulcers, disruption of skin integrity, injuries, tenderness. Site of g-tube is healing appropriately. Pt is unable to participate in incentive spirometry for atelectasis prophylaxis. COVID/Flu/RSV resulted negative 9/3. UA unremarkable 9/3. Procalcitonin unremarkable, no leukocytosis.    Plan:  - Blood cultures pending  - Symptomatic relief w IVPB Ofirmev 630 q8h, and ice packs  - Consider CT chest, abdomen, pelvis for further investigation if this does not resolve

## 2024-09-03 NOTE — PROGRESS NOTES
[facility]  Progress Note  Name: Terrie Alicea I  MRN: [id number]  Unit/Bed#: S -01 I Date of Admission: 8/24/2024   Date of Service: 9/3/2024 I Hospital Day: 10    Assessment & Plan   Fever of unknown origin  Assessment & Plan  On 9/1 pt noted to be spiking fevers intermittently. Tmax recorded as 101.6F on 9/2. CXR 9/1 was ordered to r/o aspiration PNA vs atelectasis; showed no acute cardiopulmonary process, although did reveal dilated bowel loops in LUQ. However, pt is passing flatus and having bowel movements. CTAP 8/30 showed postoperative air compatible with open g-tube placement, no evidence of perforation. Today, repeat full body examination was performed again and pt does not have any evidence of acutely infected ulcers, disruption of skin integrity, injuries, tenderness. Site of g-tube is healing appropriately. Pt is unable to participate in incentive spirometry for atelectasis prophylaxis. COVID/Flu/RSV resulted negative 9/3. UA unremarkable 9/3. Procalcitonin unremarkable, no leukocytosis.    Plan:  - Blood cultures pending  - Symptomatic relief w IVPB Ofirmev 630 q8h, and ice packs  - Consider CT chest, abdomen, pelvis for further investigation if this does not resolve    * Dysphagia  Assessment & Plan  CT neck no organic obstructions in the mediastinum. Pt has had poor PO intake, malnutrition requiring gastrostomy tube placement on 08/26. Per group home, pt attends day program which cannot manage tube feeds so requesting her to receive it from 6pm to 6am. Settings adjusted to take this into consideration per nutrition.     Plan:  Per Nutrition: Recommend trial cyclic feeds of Glucerna 1.2 @ 80 ml/hr X 12 hrs (Run 6 pm-6am). This provides 960 ml total volume, 1152 kcals, 58 g protein, and 109 g CHO. If blood sugars remain elevated on cyclic feeds, switch to continuous feeds of Glucerna 1.2 @ 40 ml/hr X 24 hrs to see if lower volume/more time improves blood sugars.  Re-consult RD as needed.   Continue Glucerna 1.2 as tolerated per nutrition   60 mL water flush before & after TF cycle  Initiate 70 mL q2h water flushes  Per SLP: can introduce pleasure feeds or puree & HTL as tolerated    DM (diabetes mellitus) (Formerly [facility])  Assessment & Plan  Lab Results   Component Value Date    HGBA1C 6.2 (H) 08/13/2024     Recent Labs     09/02/24  2258 09/03/24  0551 09/03/24  0757 09/03/24  1135   POCGLU 353* 191* 119 98       Blood Sugar Average: Last 72 hrs:  (P) [id number]    PMHx of diabetes. Pt having hyperglycemic episodes in the setting of tube feeds, possibly related to refeeding syndrome although pt has not had hypokalemia, hypomagnesemia, hypophosphatemia. Required insulin drip on 8/29 as BG>500, discontinued when BG<200. However, pt became hyperglycemic overnight 8/30 in the setting of tube feeds, MAP ~54 w minimal response to fluids and albumin, transferred to ICU. Per review of crit care notes, pt was briefly on Levophed; low suspicion of infectious etiology as pt has not had leukocytosis and has been afebrile, imaging unremarkable. Pt stable, nutrition switched feed from Jevity 1.5 to Glucerna 1.2, transferred back to med/surg on 8/31. Continue sliding scale, algorithm 3. Continue Lantus 20 units.     Plan:  - Continue Lantus to 20u q HS  - Continue insulin sliding scale, algorithm 3  - BG checks q6h  - Repeat [facility]    Mood disturbances  Assessment & Plan  - Continue home meds: Abilify 2 mg, Citalopram 40 mg.  - Pt previously on trazodone 100 mg q HS which was discontinued on 8/30 while in the ICU d/t contributions to sedation.    - Pt was previously on Depakote sprinkles (125 mg, 3 capsules BID) discontinued at previous admission from 8/23. snf requested to restart it during this current hospitalization. Re-introduced Depakote sprinkles 9/2 at 125 mg q12h. Will uptitrate as necessary.       Parkinson's disease  Assessment & Plan  History of mood disturbances,  previously on neuroleptics  Tardive dyskinesia on exam    Sinemet  qid. Can defer changes to outpatient neurology.     Incontinence  Assessment & Plan  Continue oxybutynin 5 mg TID    UTI (urinary tract infection)  Assessment & Plan  Previous culture 8/20-grew E. coli, pansensitive. CT abdomen pelvis demonstrated wall thickening of urinary bladder. Concurrent aspiration pneumonia. Pt completed course of IV abx at that time. Presently, pt has fever of unknown origin. Repeat UA unremarkable 9/3.      Cerebral palsy (HCC)  Assessment & Plan  Continue baclofen 10 mg TID          VTE Pharmacologic Prophylaxis: VTE Score: 7 High Risk (Score >/= 5) - Pharmacological DVT Prophylaxis Ordered: enoxaparin (Lovenox). Sequential Compression Devices Ordered.    Mobility:   Basic Mobility Inpatient Raw Score: 6  JH-HLM Goal: 2: Bed activities/Dependent transfer  JH-HLM Achieved: 2: Bed activities/Dependent transfer  JH-HLM Goal achieved. Continue to encourage appropriate mobility.    Patient Centered Rounds: I performed bedside rounds with nursing staff today.  Discussions with Specialists or Other Care Team Provider:       Education and Discussions with Family / Patient: Updated  (Lisa, caregiver) at bedside.    Current Length of Stay: 10 day(s)  Current Patient Status: Inpatient   Discharge Plan: Anticipate discharge in >48 hrs to group home. Still evaluating fever of unknown origin.     Code Status: Level 1 - Full Code    Subjective:   Per nursing report overnight, pt noted to be persistently tachycardic and febrile despite Tylenol and ice. She was evaluated by overnight team and appeared to have no apparent cause on physical exam and EKG. She was given Isolyte 500cc bolus, with 100cc/h for 7 hours.     Today, pt is seen and examined at beside.  Pt is resting in bed, occasionally opening her eyes to verbal stimuli. On physical examination, pt is not observed to have any disruptions of skin integrity or  erythematous changes. No evidence of acute infectious process at site of g-tube, under abdominal binder, on sacrum, inner knees, heels, shoulders, back, elbows, and hands. Caregiver is at bedside; updates and support provided.       Objective:     Vitals:   Temp (24hrs), Av.4 °F (38 °C), Min:97.3 °F (36.3 °C), Max:102.4 °F (39.1 °C)    Temp:  [97.3 °F (36.3 °C)-102.4 °F (39.1 °C)] 97.3 °F (36.3 °C)  HR:  [] 85  Resp:  [14-16] 14  BP: ()/() 89/53  SpO2:  [95 %-98 %] 95 %  Body mass index is 19.03 kg/m².     Input and Output Summary (last 24 hours):     Intake/Output Summary (Last 24 hours) at 9/3/2024 1458  Last data filed at 9/3/2024 1414  Gross per 24 hour   Intake 2242 ml   Output 200 ml   Net 2042 ml       Physical Exam:   Physical Exam  Vitals reviewed.   Constitutional:       General: She is not in acute distress.     Appearance: She is underweight.      Interventions: Nasal cannula in place.   HENT:      Right Ear: External ear normal.      Left Ear: External ear normal.      Mouth/Throat:      Comments: Lip smacking (chronic)  Cardiovascular:      Rate and Rhythm: Regular rhythm. Tachycardia present.   Pulmonary:      Effort: No respiratory distress.   Abdominal:      General: Bowel sounds are normal.      Tenderness: There is no abdominal tenderness. There is no guarding.      Comments: G-tube in place   Musculoskeletal:         General: Deformity present. No swelling or tenderness.   Skin:     General: Skin is warm.      Capillary Refill: Capillary refill takes 2 to 3 seconds.      Coloration: Skin is not jaundiced.      Findings: No erythema or rash.   Neurological:      Mental Status: Mental status is at baseline.         Additional Data:     Labs:  Results from last 7 days   Lab Units 24  0323   WBC Thousand/uL 8.54   HEMOGLOBIN g/dL 10.8*   HEMATOCRIT % 33.3*   PLATELETS Thousands/uL 255   SEGS PCT % 57   LYMPHO PCT % 27   MONO PCT % 9   EOS PCT % 5     Results from last 7  days   Lab Units 09/03/24  0324 09/01/24  0349 08/31/24  0501   SODIUM mmol/L 137   < > 139   POTASSIUM mmol/L 4.8   < > 4.0   CHLORIDE mmol/L 95*   < > 104   CO2 mmol/L 36*   < > 31   BUN mg/dL 26*   < > 13   CREATININE mg/dL 1.04   < > 0.82   ANION GAP mmol/L 6   < > 4   CALCIUM mg/dL 10.0   < > 9.5   ALBUMIN g/dL  --   --  3.2*   TOTAL BILIRUBIN mg/dL  --   --  0.26   ALK PHOS U/L  --   --  50   ALT U/L  --   --  4*   AST U/L  --   --  10*   GLUCOSE RANDOM mg/dL 172*   < > 222*    < > = values in this interval not displayed.         Results from last 7 days   Lab Units 09/03/24  1135 09/03/24  0757 09/03/24  0551 09/02/24  2258 09/02/24  1657 09/02/24  1224 09/02/24  1101 09/02/24  0703 09/02/24  0511 09/01/24  2304 09/01/24  2031 09/01/24  1539   POC GLUCOSE mg/dl 98 119 191* 353* 137 89 81 199* 322* 241* 167* 220*         Results from last 7 days   Lab Units 09/03/24  0323 09/02/24  0456 09/01/24  0349 08/30/24  0512 08/30/24  0345   LACTIC ACID mmol/L 1.8  --  1.1 2.2* 2.8*   PROCALCITONIN ng/ml  --  0.12  --   --  0.22       Lines/Drains:  Invasive Devices       Peripheral Intravenous Line  Duration             Peripheral IV 08/30/24 Distal;Right;Upper;Ventral (anterior) Arm 4 days    Peripheral IV 08/30/24 Proximal;Right;Ventral (anterior) Forearm 4 days    Peripheral IV 09/03/24 Distal;Left;Ventral (anterior) Forearm <1 day              Drain  Duration             Gastrostomy/Enterostomy  16 Fr. LUQ 7 days                      Imaging: Reviewed radiology reports from this admission including: chest xray and abdominal/pelvic CT    Recent Cultures (last 7 days):   Results from last 7 days   Lab Units 09/03/24  0324 08/30/24  0523   BLOOD CULTURE  Received in Microbiology Lab. Culture in Progress.  Received in Microbiology Lab. Culture in Progress. No Growth After 4 Days.  No Growth After 4 Days.       Last 24 Hours Medication List:   Current Facility-Administered Medications   Medication Dose Route  Frequency Provider Last Rate    acetaminophen  15 mg/kg Intravenous Q8H PRN [place]  mg (09/03/24 0038)    ammonium lactate   Topical BID PRN Lisa Carty MD      ARIPiprazole  2 mg Per G Tube HS Lisa Carty MD      artificial tear   Both Eyes HS PRN Lisa Carty MD      vitamin C  1,000 mg Per G Tube Daily Lisa Carty MD      baclofen  10 mg Per G Tube TID Lisa Carty MD      calcium carbonate  750 mg Oral BID Lisa Carty MD      carbamide peroxide  2 drop Both Ears BID PRN Lisa Carty MD      carbidopa-levodopa  1 tablet Oral 4x Daily Lisa Carty MD      cholecalciferol  1,000 Units Per G Tube Daily Lisa Carty MD      citalopram  40 mg Per G Tube Daily Lisa Carty MD      dextromethorphan-guaiFENesin  10 mL Per G Tube Q4H PRN Lisa Carty MD      divalproex sodium  125 mg Per G Tube Q12H ROSEMARY Silvana Mike DO      docosanol   Topical 5x Daily Lisa Carty MD      enoxaparin  40 mg Subcutaneous Daily Lisa Carty MD      Ferrous Sulfate  300 mg Oral BID AC Lisa Carty MD      fluticasone  1 spray Nasal Daily PRN Lisa Carty MD      hydrocortisone   Topical 4x Daily PRN Lisa Carty MD      insulin glargine  20 Units Subcutaneous HS Silvana Mike DO      insulin lispro  1-6 Units Subcutaneous Q6H ROSEMARY Lsia Carty MD      Ketotifen Fumarate  1 drop Both Eyes Daily PRN Lisa Carty MD      levothyroxine  25 mcg Per G Tube QA Lisa Carty MD      Loratadine  10 mg Oral Daily PRN Lisa Carty MD      LORazepam  0.5 mg Per G Tube HS Sonya Young MD      methenamine hippurate  1 g Oral BID Lisa Carty MD      omeprazole (PRILOSEC) suspension 2 mg/mL  20 mg Oral Daily Lisa Carty MD      oxybutynin  5 mg Per G Tube TID Lisa Carty MD      polyethylene glycol  17 g Oral Daily Lisa Carty MD      pravastatin  40 mg Per G Tube Daily With Dinner Lisa Carty MD          Today, Patient Was Seen By: Silvana Mike DO  PGY-1    **Please Note: This note may have been constructed using a voice recognition system.**

## 2024-09-03 NOTE — ASSESSMENT & PLAN NOTE
CT neck no organic obstructions in the mediastinum. Pt has had poor PO intake, malnutrition requiring gastrostomy tube placement on 08/26. Per group home, pt attends day program which cannot manage tube feeds so requesting her to receive it from 6pm to 6am. Settings adjusted to take this into consideration per nutrition.     Plan:  Per Nutrition: Recommend trial cyclic feeds of Glucerna 1.2 @ 80 ml/hr X 12 hrs (Run 6 pm-6am). This provides 960 ml total volume, 1152 kcals, 58 g protein, and 109 g CHO. If blood sugars remain elevated on cyclic feeds, switch to continuous feeds of Glucerna 1.2 @ 40 ml/hr X 24 hrs to see if lower volume/more time improves blood sugars. Re-consult RD as needed.   Continue Glucerna 1.2 as tolerated per nutrition   60 mL water flush before & after TF cycle  Initiate 70 mL q2h water flushes  Per SLP: can introduce pleasure feeds or puree & HTL as tolerated

## 2024-09-03 NOTE — ASSESSMENT & PLAN NOTE
Previous culture 8/20-grew E. coli, pansensitive. CT abdomen pelvis demonstrated wall thickening of urinary bladder. Concurrent aspiration pneumonia. Pt completed course of IV abx at that time. Repeat UA unremarkable 9/3.

## 2024-09-03 NOTE — ASSESSMENT & PLAN NOTE
Lab Results   Component Value Date    HGBA1C 6.2 (H) 08/13/2024     Recent Labs     09/02/24  2258 09/03/24  0551 09/03/24  0757 09/03/24  1135   POCGLU 353* 191* 119 98     Blood Sugar Average: Last 72 hrs:  (P) 223.3398251224124437    PMHx of diabetes. Pt having hyperglycemic episodes in the setting of tube feeds, possibly related to refeeding syndrome although pt has not had hypokalemia, hypomagnesemia, hypophosphatemia. Required insulin drip on 8/29 as BG>500, discontinued when BG<200. However, pt became hyperglycemic overnight 8/30 in the setting of tube feeds, MAP ~54 w minimal response to fluids and albumin, transferred to ICU. Per review of crit care notes, pt was briefly on Levophed; low suspicion of infectious etiology as pt has not had leukocytosis and has been afebrile, imaging unremarkable. Pt stable, nutrition switched feed from Jevity 1.5 to Glucerna 1.2, transferred back to med/surg on 8/31. Continue sliding scale, algorithm 3. Continue Lantus 20 units.     Plan:  - Continue Lantus to 20u q HS, first dose tonight 9/2  - Continue insulin sliding scale, algorithm 3  - BG checks q6h  - Repeat BMP

## 2024-09-03 NOTE — ASSESSMENT & PLAN NOTE
CT neck no organic obstructions in the mediastinum. Pt has had poor PO intake, malnutrition requiring gastrostomy tube placement on 08/26. Per group home, pt attends day program which cannot manage tube feeds so requesting her to receive it from 6pm to 6am. Settings adjusted to take this into consideration per nutrition.     Plan:  Per Nutrition: Recommend trial cyclic feeds of Glucerna 1.2 @ 80 ml/hr X 12 hrs (Run 6 pm-6am). This provides 960 ml total volume, 1152 kcals, 58 g protein, and 109 g CHO. If blood sugars remain elevated on cyclic feeds, switch to continuous feeds of Glucerna 1.2 @ 40 ml/hr X 24 hrs to see if lower volume/more time improves blood sugars. Re-consult RD as needed.   Continue Glucerna 1.2 as tolerated per nutrition   60 mL water flush before & after TF cycle  70 mL q2h water flushes  Per SLP: can introduce pleasure feeds or puree & HTL as tolerated

## 2024-09-03 NOTE — ASSESSMENT & PLAN NOTE
- Continue home meds: Abilify 2 mg, Citalopram 40 mg.  - Pt previously on trazodone 100 mg q HS which was discontinued on 8/30 while in the ICU d/t contributions to sedation.    - Continue Depakene oral solution 125 mg q12h for mood, can defer changes per outpatient psychiatry as deemed necessary.  - Pt has been stable from this standpoint throughout hospitalization, hasn't required restarting of trazodone or escalation in valproic acid thus far.

## 2024-09-03 NOTE — ASSESSMENT & PLAN NOTE
Chief Complaint   Patient presents with   • Surgical Followup     p/o - Right trigger thumb release + CMC interposition arthroplasty, DOS 02/23/22, + sutures out for foreign body removal (03/17/22)       Esperanza Pan, 73 year old, adult presenting for follow up of the above procedures. Sutures removed today from foreign body removal.     Patient has no concerns with their incision at this time.  Incision is clean, dry, and intact with no signs of infection. No significant redness, warmth, or drainage at incision site. Dressing Removed.  Sutures were removed.     Tobacco/Drug/Alcohol use verified. Latex allergy reviewed. Medications reviewed. Surgical and Medical History reviewed.     Previous culture 8/20-grew E. coli, pansensitive. CT abdomen pelvis demonstrated wall thickening of urinary bladder. Concurrent aspiration pneumonia. Pt completed course of IV abx at that time. Presently, pt has fever of unknown origin. Repeat UA unremarkable 9/3.

## 2024-09-04 ENCOUNTER — TELEPHONE (OUTPATIENT)
Dept: FAMILY MEDICINE CLINIC | Facility: CLINIC | Age: 64
End: 2024-09-04

## 2024-09-04 LAB
ANION GAP SERPL CALCULATED.3IONS-SCNC: 5 MMOL/L (ref 4–13)
BACTERIA BLD CULT: NORMAL
BACTERIA BLD CULT: NORMAL
BASOPHILS # BLD AUTO: 0.04 THOUSANDS/ΜL (ref 0–0.1)
BASOPHILS NFR BLD AUTO: 1 % (ref 0–1)
BUN SERPL-MCNC: 24 MG/DL (ref 5–25)
CALCIUM SERPL-MCNC: 8.7 MG/DL (ref 8.4–10.2)
CHLORIDE SERPL-SCNC: 100 MMOL/L (ref 96–108)
CO2 SERPL-SCNC: 31 MMOL/L (ref 21–32)
CREAT SERPL-MCNC: 0.87 MG/DL (ref 0.6–1.3)
EOSINOPHIL # BLD AUTO: 0.64 THOUSAND/ΜL (ref 0–0.61)
EOSINOPHIL NFR BLD AUTO: 10 % (ref 0–6)
ERYTHROCYTE [DISTWIDTH] IN BLOOD BY AUTOMATED COUNT: 14.1 % (ref 11.6–15.1)
GFR SERPL CREATININE-BSD FRML MDRD: 71 ML/MIN/1.73SQ M
GLUCOSE SERPL-MCNC: 121 MG/DL (ref 65–140)
GLUCOSE SERPL-MCNC: 195 MG/DL (ref 65–140)
GLUCOSE SERPL-MCNC: 225 MG/DL (ref 65–140)
GLUCOSE SERPL-MCNC: 244 MG/DL (ref 65–140)
GLUCOSE SERPL-MCNC: 309 MG/DL (ref 65–140)
GLUCOSE SERPL-MCNC: 89 MG/DL (ref 65–140)
HCT VFR BLD AUTO: 26.7 % (ref 34.8–46.1)
HGB BLD-MCNC: 8.5 G/DL (ref 11.5–15.4)
IMM GRANULOCYTES # BLD AUTO: 0.03 THOUSAND/UL (ref 0–0.2)
IMM GRANULOCYTES NFR BLD AUTO: 1 % (ref 0–2)
LYMPHOCYTES # BLD AUTO: 2.44 THOUSANDS/ΜL (ref 0.6–4.47)
LYMPHOCYTES NFR BLD AUTO: 37 % (ref 14–44)
MCH RBC QN AUTO: 32.8 PG (ref 26.8–34.3)
MCHC RBC AUTO-ENTMCNC: 31.8 G/DL (ref 31.4–37.4)
MCV RBC AUTO: 103 FL (ref 82–98)
MONOCYTES # BLD AUTO: 0.63 THOUSAND/ΜL (ref 0.17–1.22)
MONOCYTES NFR BLD AUTO: 10 % (ref 4–12)
NEUTROPHILS # BLD AUTO: 2.85 THOUSANDS/ΜL (ref 1.85–7.62)
NEUTS SEG NFR BLD AUTO: 41 % (ref 43–75)
NRBC BLD AUTO-RTO: 0 /100 WBCS
PLATELET # BLD AUTO: 188 THOUSANDS/UL (ref 149–390)
PMV BLD AUTO: 11.9 FL (ref 8.9–12.7)
POTASSIUM SERPL-SCNC: 4.9 MMOL/L (ref 3.5–5.3)
RBC # BLD AUTO: 2.59 MILLION/UL (ref 3.81–5.12)
SODIUM SERPL-SCNC: 136 MMOL/L (ref 135–147)
WBC # BLD AUTO: 6.63 THOUSAND/UL (ref 4.31–10.16)

## 2024-09-04 PROCEDURE — 99232 SBSQ HOSP IP/OBS MODERATE 35: CPT | Performed by: INTERNAL MEDICINE

## 2024-09-04 PROCEDURE — 80048 BASIC METABOLIC PNL TOTAL CA: CPT

## 2024-09-04 PROCEDURE — 85025 COMPLETE CBC W/AUTO DIFF WBC: CPT

## 2024-09-04 PROCEDURE — 82948 REAGENT STRIP/BLOOD GLUCOSE: CPT

## 2024-09-04 RX ORDER — VALPROIC ACID 250 MG/5ML
125 SOLUTION ORAL EVERY 12 HOURS SCHEDULED
Status: DISCONTINUED | OUTPATIENT
Start: 2024-09-04 | End: 2024-09-19 | Stop reason: HOSPADM

## 2024-09-04 RX ORDER — INSULIN GLARGINE 100 [IU]/ML
25 INJECTION, SOLUTION SUBCUTANEOUS
Status: DISCONTINUED | OUTPATIENT
Start: 2024-09-04 | End: 2024-09-06

## 2024-09-04 RX ADMIN — Medication 20 MG: at 10:40

## 2024-09-04 RX ADMIN — BACLOFEN 10 MG: 10 TABLET ORAL at 10:37

## 2024-09-04 RX ADMIN — INSULIN LISPRO 4 UNITS: 100 INJECTION, SOLUTION INTRAVENOUS; SUBCUTANEOUS at 06:16

## 2024-09-04 RX ADMIN — DOCOSANOL: 100 CREAM TOPICAL at 21:07

## 2024-09-04 RX ADMIN — PRAVASTATIN SODIUM 40 MG: 40 TABLET ORAL at 17:35

## 2024-09-04 RX ADMIN — OXYBUTYNIN CHLORIDE 5 MG: 5 TABLET ORAL at 10:37

## 2024-09-04 RX ADMIN — VALPROIC ACID 125 MG: 250 SOLUTION ORAL at 21:04

## 2024-09-04 RX ADMIN — INSULIN GLARGINE 25 UNITS: 100 INJECTION, SOLUTION SUBCUTANEOUS at 21:19

## 2024-09-04 RX ADMIN — DOCOSANOL: 100 CREAM TOPICAL at 17:37

## 2024-09-04 RX ADMIN — ENOXAPARIN SODIUM 40 MG: 40 INJECTION SUBCUTANEOUS at 10:38

## 2024-09-04 RX ADMIN — CALCIUM CARBONATE (ANTACID) CHEW TAB 500 MG 750 MG: 500 CHEW TAB at 10:36

## 2024-09-04 RX ADMIN — CALCIUM CARBONATE (ANTACID) CHEW TAB 500 MG 750 MG: 500 CHEW TAB at 21:05

## 2024-09-04 RX ADMIN — OXYBUTYNIN CHLORIDE 5 MG: 5 TABLET ORAL at 17:35

## 2024-09-04 RX ADMIN — CITALOPRAM HYDROBROMIDE 40 MG: 20 TABLET ORAL at 10:37

## 2024-09-04 RX ADMIN — OXYCODONE HYDROCHLORIDE AND ACETAMINOPHEN 1000 MG: 500 TABLET ORAL at 10:37

## 2024-09-04 RX ADMIN — METHENAMINE HIPPURATE 1 G: 1 TABLET ORAL at 10:39

## 2024-09-04 RX ADMIN — LEVOTHYROXINE SODIUM 25 MCG: 25 TABLET ORAL at 10:37

## 2024-09-04 RX ADMIN — POLYETHYLENE GLYCOL 3350 17 G: 17 POWDER, FOR SOLUTION ORAL at 10:40

## 2024-09-04 RX ADMIN — ARIPIPRAZOLE 2 MG: 2 TABLET ORAL at 21:07

## 2024-09-04 RX ADMIN — METHENAMINE HIPPURATE 1 G: 1 TABLET ORAL at 17:35

## 2024-09-04 RX ADMIN — FERROUS SULFATE 300 MG: 300 SOLUTION ORAL at 17:36

## 2024-09-04 RX ADMIN — DIVALPROEX SODIUM 125 MG: 125 CAPSULE, COATED PELLETS ORAL at 10:39

## 2024-09-04 RX ADMIN — BACLOFEN 10 MG: 10 TABLET ORAL at 17:35

## 2024-09-04 RX ADMIN — Medication 1000 UNITS: at 10:37

## 2024-09-04 RX ADMIN — CARBIDOPA AND LEVODOPA 1 TABLET: 25; 100 TABLET ORAL at 21:06

## 2024-09-04 RX ADMIN — DOCOSANOL: 100 CREAM TOPICAL at 10:41

## 2024-09-04 RX ADMIN — DOCOSANOL: 100 CREAM TOPICAL at 14:00

## 2024-09-04 RX ADMIN — BACLOFEN 10 MG: 10 TABLET ORAL at 21:06

## 2024-09-04 RX ADMIN — LORATADINE 10 MG: 5 SOLUTION ORAL at 10:39

## 2024-09-04 RX ADMIN — FERROUS SULFATE 300 MG: 300 SOLUTION ORAL at 10:36

## 2024-09-04 RX ADMIN — CEFTRIAXONE SODIUM 1000 MG: 10 INJECTION, POWDER, FOR SOLUTION INTRAVENOUS at 11:10

## 2024-09-04 RX ADMIN — CARBIDOPA AND LEVODOPA 1 TABLET: 25; 100 TABLET ORAL at 17:35

## 2024-09-04 RX ADMIN — LORAZEPAM 0.5 MG: 0.5 TABLET ORAL at 21:06

## 2024-09-04 RX ADMIN — INSULIN LISPRO 2 UNITS: 100 INJECTION, SOLUTION INTRAVENOUS; SUBCUTANEOUS at 00:11

## 2024-09-04 RX ADMIN — OXYBUTYNIN CHLORIDE 5 MG: 5 TABLET ORAL at 21:06

## 2024-09-04 RX ADMIN — CARBIDOPA AND LEVODOPA 1 TABLET: 25; 100 TABLET ORAL at 10:37

## 2024-09-04 NOTE — PLAN OF CARE
Problem: Nutrition/Hydration-ADULT  Goal: Nutrient/Hydration intake appropriate for improving, restoring or maintaining nutritional needs  Description: Monitor and assess patient's nutrition/hydration status for malnutrition. Collaborate with interdisciplinary team and initiate plan and interventions as ordered.  Monitor patient's weight and dietary intake as ordered or per policy. Utilize nutrition screening tool and intervene as necessary. Determine patient's food preferences and provide high-protein, high-caloric foods as appropriate.     INTERVENTIONS:  - Monitor oral intake, urinary output, labs, and treatment plans  - Assess nutrition and hydration status and recommend course of action  - Evaluate amount of meals eaten  - Assist patient with eating if necessary   - Allow adequate time for meals  - Recommend/ encourage appropriate diets, oral nutritional supplements, and vitamin/mineral supplements  - Order, calculate, and assess calorie counts as needed  - Recommend, monitor, and adjust tube feedings and TPN/PPN based on assessed needs  - Assess need for intravenous fluids  - Provide specific nutrition/hydration education as appropriate  - Include patient/family/caregiver in decisions related to nutrition  Outcome: Progressing     Problem: Prexisting or High Potential for Compromised Skin Integrity  Goal: Skin integrity is maintained or improved  Description: INTERVENTIONS:  - Identify patients at risk for skin breakdown  - Assess and monitor skin integrity  - Assess and monitor nutrition and hydration status  - Monitor labs   - Assess for incontinence   - Turn and reposition patient  - Assist with mobility/ambulation  - Relieve pressure over bony prominences  - Avoid friction and shearing  - Provide appropriate hygiene as needed including keeping skin clean and dry  - Evaluate need for skin moisturizer/barrier cream  - Collaborate with interdisciplinary team   - Patient/family teaching  - Consider wound  care consult   Outcome: Progressing     Problem: Potential for Falls  Goal: Patient will remain free of falls  Description: INTERVENTIONS:  - Educate patient/family on patient safety including physical limitations  - Instruct patient to call for assistance with activity   - Consult OT/PT to assist with strengthening/mobility   - Keep Call bell within reach  - Keep bed low and locked with side rails adjusted as appropriate  - Keep care items and personal belongings within reach  - Initiate and maintain comfort rounds  - Make Fall Risk Sign visible to staff  - Offer Toileting every 2 Hours, in advance of need  - Initiate/Maintain bed and chair alarm  - Obtain necessary fall risk management equipment:   - Apply yellow socks and bracelet for high fall risk patients  - Consider moving patient to room near nurses station  Outcome: Progressing     Problem: GASTROINTESTINAL - ADULT  Goal: Minimal or absence of nausea and/or vomiting  Description: INTERVENTIONS:  - Administer IV fluids if ordered to ensure adequate hydration  - Maintain NPO status until nausea and vomiting are resolved  - Nasogastric tube if ordered  - Administer ordered antiemetic medications as needed  - Provide nonpharmacologic comfort measures as appropriate  - Advance diet as tolerated, if ordered  - Consider nutrition services referral to assist patient with adequate nutrition and appropriate food choices  Outcome: Progressing  Goal: Maintains or returns to baseline bowel function  Description: INTERVENTIONS:  - Assess bowel function  - Encourage oral fluids to ensure adequate hydration  - Administer IV fluids if ordered to ensure adequate hydration  - Administer ordered medications as needed  - Encourage mobilization and activity  - Consider nutritional services referral to assist patient with adequate nutrition and appropriate food choices  Outcome: Progressing  Goal: Maintains adequate nutritional intake  Description: INTERVENTIONS:  - Monitor  percentage of each meal consumed  - Identify factors contributing to decreased intake, treat as appropriate  - Assist with meals as needed  - Monitor I&O, weight, and lab values if indicated  - Obtain nutrition services referral as needed  Outcome: Progressing  Goal: Oral mucous membranes remain intact  Description: INTERVENTIONS  - Assess oral mucosa and hygiene practices  - Implement preventative oral hygiene regimen  - Implement oral medicated treatments as ordered  - Initiate Nutrition services referral as needed  Outcome: Progressing

## 2024-09-04 NOTE — PROGRESS NOTES
"[facility]  Progress Note  Name: Terrie Alicea I  MRN: [id number]  Unit/Bed#: S -01 I Date of Admission: 8/24/2024   Date of Service: 9/4/2024 I Hospital Day: 11    Assessment & Plan   Fever of unknown origin, likely aspiration pneumonia  Assessment & Plan  On 9/1 pt noted to be spiking fevers intermittently. Tmax recorded as 102.4F on 9/3. CXR 9/1 was ordered to r/o aspiration PNA vs atelectasis; showed no acute cardiopulmonary process, although did reveal dilated bowel loops in LUQ. However, pt is passing flatus and having bowel movements. CTAP 8/30 showed postoperative air compatible with open g-tube placement, no evidence of perforation. Repeat skin exam 9/3 without any acutely infected ulcers, disruption of skin integrity, injuries, tenderness. Site of g-tube is healing appropriately. Pt is unable to participate in incentive spirometry for atelectasis prophylaxis. COVID/Flu/RSV and UA negative 9/3. Blood cultures at 24h showed no growth. Procalcitonin unremarkable, no leukocytosis. Per report from CT chest, abdomen, pelvis w contrast report 9/3 shows new opacification of \"R lower lobe bronchus, R atelectasis, w sequela of aspiration in R lower lobe, Multiple indeterminate nodular densities in the right breast, possibly reflecting sequela of trauma/fat necrosis.\" As of today 9/4 pt has been afebrile, however now hypotensive. No tachycardia, tachypnea. Could be attributed to autonomic dysfunction secondary to Parkinson's, will continue to monitor.     Plan:  - Initiate IV Rocephin 1000 mg q 5 d for aspiration PNA.   - Repeat procalcitonin, lactic acid, CBC  - Symptomatic relief w IVPB Ofirmev 630 q8h, and ice packs  - Continue 70 mL flushes q2h   - VS q2h until BP appears to be in a stable range    * Dysphagia  Assessment & Plan  CT neck no organic obstructions in the mediastinum. Pt has had poor PO intake, malnutrition requiring gastrostomy tube placement on 08/26. Per group " home, pt attends day program which cannot manage tube feeds so requesting her to receive it from 6pm to 6am. Settings adjusted to take this into consideration per nutrition.     Plan:  Per Nutrition: Recommend trial cyclic feeds of Glucerna 1.2 @ 80 ml/hr X 12 hrs (Run 6 pm-6am). This provides 960 ml total volume, 1152 kcals, 58 g protein, and 109 g CHO. If blood sugars remain elevated on cyclic feeds, switch to continuous feeds of Glucerna 1.2 @ 40 ml/hr X 24 hrs to see if lower volume/more time improves blood sugars. Re-consult RD as needed.   Continue Glucerna 1.2 as tolerated per nutrition   60 mL water flush before & after TF cycle  70 mL q2h water flushes  Per SLP: can introduce pleasure feeds or puree & HTL as tolerated    DM (diabetes mellitus) (HCC)  Assessment & Plan  Lab Results   Component Value Date    HGBA1C 6.2 (H) 08/13/2024     Recent Labs     09/03/24  1537 09/03/24  1756 09/04/24  0009 09/04/24  0756   POCGLU 72 106 225* 244*     Blood Sugar Average: Last 72 hrs:  (P) 213    PMHx of diabetes. Pt having hyperglycemic episodes in the setting of tube feeds, possibly related to refeeding syndrome although pt has not had hypokalemia, hypomagnesemia, hypophosphatemia. Required insulin drip on 8/29 as BG>500, discontinued when BG<200. However, pt became hyperglycemic overnight 8/30 in the setting of tube feeds, MAP ~54 w minimal response to fluids and albumin, transferred to ICU. Per review of crit care notes, pt was briefly on Levophed; low suspicion of infectious etiology as pt has not had leukocytosis and has been afebrile, imaging unremarkable. Pt stable, nutrition switched feed from Jevity 1.5 to Glucerna 1.2, transferred back to med/surg on 8/31. Pt noted to be hyperglycemic, could be attributable to underlying infectious process as well.    Plan:  - Increase Lantus to 25u q HS  - Continue insulin sliding scale, algorithm 3  - BG checks q6h  - Repeat CMP    Mood disturbances  Assessment & Plan  -  Continue home meds: Abilify 2 mg, Citalopram 40 mg.  - Pt previously on trazodone 100 mg q HS which was discontinued on 8/30 while in the ICU d/t contributions to sedation.    - Pt was previously on Depakote sprinkles (125 mg, 3 capsules BID) discontinued at previous admission from 8/23. correction requested to restart it during this current hospitalization. Re-introduced Depakote sprinkles 9/2 at 125 mg q12h. Will uptitrate as necessary.     Parkinson's disease  Assessment & Plan  History of mood disturbances, previously on neuroleptics  Tardive dyskinesia on exam  Potential autonomic dysfunction related to PD, evidenced by hypotension.     Sinemet  qid. Can defer changes to outpatient neurology.     Incontinence  Assessment & Plan  Continue oxybutynin 5 mg TID    UTI (urinary tract infection)  Assessment & Plan  Previous culture 8/20-grew E. coli, pansensitive. CT abdomen pelvis demonstrated wall thickening of urinary bladder. Concurrent aspiration pneumonia. Pt completed course of IV abx at that time. Presently, pt has fever of unknown origin. Repeat UA unremarkable 9/3.      Cerebral palsy (HCC)  Assessment & Plan  Continue baclofen 10 mg TID          VTE Pharmacologic Prophylaxis: VTE Score: 7 High Risk (Score >/= 5) - Pharmacological DVT Prophylaxis Ordered: enoxaparin (Lovenox). Sequential Compression Devices Ordered.    Mobility:   Basic Mobility Inpatient Raw Score: 6  JH-HLM Goal: 2: Bed activities/Dependent transfer  JH-HLM Achieved: 2: Bed activities/Dependent transfer  JH-HLM Goal achieved. Continue to encourage appropriate mobility.    Patient Centered Rounds: I performed bedside rounds with nursing staff today.  Discussions with Specialists or Other Care Team Provider:       Education and Discussions with Family / Patient: Updated  (caregiver) via phone.    Current Length of Stay: 11 day(s)  Current Patient Status: Inpatient   Discharge Plan: Anticipate discharge in >48 hrs to  group home. Completing IV antibiotics course and achieving stable VS    Code Status: Level 1 - Full Code    Subjective:   Per nursing report, pt noted to be hypotensive earlier this morning (85/71). However, pt is afebrile and not tachypneic or tachycardic. Today, pt is seen and examined at beside. She is seen resting in bed, awake, and does not appear to be in acute distress. Physical examination largely at baseline.     Objective:     Vitals:   Temp (24hrs), Av.6 °F (37 °C), Min:97.9 °F (36.6 °C), Max:99.3 °F (37.4 °C)    Temp:  [97.9 °F (36.6 °C)-99.3 °F (37.4 °C)] 97.9 °F (36.6 °C)  HR:  [64-98] 71  Resp:  [14] 14  BP: ()/(48-71) 83/53  SpO2:  [91 %-100 %] 96 %  Body mass index is 20.41 kg/m².     Input and Output Summary (last 24 hours):     Intake/Output Summary (Last 24 hours) at 2024 1155  Last data filed at 2024 0900  Gross per 24 hour   Intake 2500 ml   Output 200 ml   Net 2300 ml       Physical Exam:   Physical Exam  Vitals reviewed.   Constitutional:       General: She is not in acute distress.     Appearance: She is underweight.      Interventions: Nasal cannula in place.   HENT:      Right Ear: External ear normal.      Left Ear: External ear normal.      Mouth/Throat:      Comments: Lip smacking (chronic)  Cardiovascular:      Rate and Rhythm: Regular rhythm. Tachycardia present.   Pulmonary:      Effort: No tachypnea or respiratory distress.      Breath sounds: No stridor.   Abdominal:      General: Bowel sounds are normal.      Tenderness: There is no abdominal tenderness. There is no guarding.      Comments: G-tube in place   Musculoskeletal:         General: Deformity present. No swelling or tenderness.   Skin:     General: Skin is warm.      Capillary Refill: Capillary refill takes less than 2 seconds.      Coloration: Skin is not jaundiced.      Findings: No erythema or rash.   Neurological:      Mental Status: Mental status is at baseline.         Additional Data:      Labs:  Results from last 7 days   Lab Units 09/04/24  0508   WBC Thousand/uL 6.63   HEMOGLOBIN g/dL 8.5*   HEMATOCRIT % 26.7*   PLATELETS Thousands/uL 188   SEGS PCT % 41*   LYMPHO PCT % 37   MONO PCT % 10   EOS PCT % 10*     Results from last 7 days   Lab Units 09/04/24  0508 09/01/24  0349 08/31/24  0501   SODIUM mmol/L 136   < > 139   POTASSIUM mmol/L 4.9   < > 4.0   CHLORIDE mmol/L 100   < > 104   CO2 mmol/L 31   < > 31   BUN mg/dL 24   < > 13   CREATININE mg/dL 0.87   < > 0.82   ANION GAP mmol/L 5   < > 4   CALCIUM mg/dL 8.7   < > 9.5   ALBUMIN g/dL  --   --  3.2*   TOTAL BILIRUBIN mg/dL  --   --  0.26   ALK PHOS U/L  --   --  50   ALT U/L  --   --  4*   AST U/L  --   --  10*   GLUCOSE RANDOM mg/dL 309*   < > 222*    < > = values in this interval not displayed.         Results from last 7 days   Lab Units 09/04/24  0756 09/04/24  0009 09/03/24  1756 09/03/24  1537 09/03/24  1135 09/03/24  0757 09/03/24  0551 09/02/24  2258 09/02/24  1657 09/02/24  1224 09/02/24  1101 09/02/24  0703   POC GLUCOSE mg/dl 244* 225* 106 72 98 119 191* 353* 137 89 81 199*         Results from last 7 days   Lab Units 09/03/24  0323 09/02/24  0456 09/01/24  0349 08/30/24  0512 08/30/24  0345   LACTIC ACID mmol/L 1.8  --  1.1 2.2* 2.8*   PROCALCITONIN ng/ml  --  0.12  --   --  0.22       Lines/Drains:  Invasive Devices       Peripheral Intravenous Line  Duration             Peripheral IV 09/03/24 Distal;Left;Ventral (anterior) Forearm 1 day              Drain  Duration             Gastrostomy/Enterostomy  16 Fr. LUQ 8 days                      Imaging: Reviewed radiology reports from this admission including: chest xray and abdominal/pelvic CT    Recent Cultures (last 7 days):   Results from last 7 days   Lab Units 09/03/24  0324 08/30/24  0523   BLOOD CULTURE  No Growth at 24 hrs.  No Growth at 24 hrs. No Growth After 5 Days.  No Growth After 5 Days.       Last 24 Hours Medication List:   Current Facility-Administered Medications    Medication Dose Route Frequency Provider Last Rate    acetaminophen  15 mg/kg Intravenous Q8H PRN Reg [place]  mg (09/03/24 0038)    ammonium lactate   Topical BID PRN Lisa Carty MD      ARIPiprazole  2 mg Per G Tube HS Lisa Carty MD      artificial tear   Both Eyes HS PRN Lisa Carty MD      vitamin C  1,000 mg Per G Tube Daily Lisa Carty MD      baclofen  10 mg Per G Tube TID Lisa Carty MD      calcium carbonate  750 mg Oral BID Lisa Carty MD      carbamide peroxide  2 drop Both Ears BID PRN Lisa Carty MD      carbidopa-levodopa  1 tablet Oral 4x Daily Lisa Carty MD      cefTRIAXone  1,000 mg Intravenous Q24H Karen Moreno MD 1,000 mg (09/04/24 1110)    cholecalciferol  1,000 Units Per G Tube Daily Lisa Carty MD      citalopram  40 mg Per G Tube Daily Lisa Carty MD      dextromethorphan-guaiFENesin  10 mL Per G Tube Q4H PRN Lisa Carty MD      divalproex sodium  125 mg Per G Tube Q12H [facility] Silvana Mike DO      docosanol   Topical 5x Daily Lisa Carty MD      enoxaparin  40 mg Subcutaneous Daily Lisa Carty MD      Ferrous Sulfate  300 mg Oral BID AC Lisa Carty MD      fluticasone  1 spray Nasal Daily PRN Lisa Carty MD      hydrocortisone   Topical 4x Daily PRN Lisa Carty MD      insulin glargine  25 Units Subcutaneous HS Karen Moreno MD      insulin lispro  1-6 Units Subcutaneous Q6H [facility] Lisa Carty MD      Ketotifen Fumarate  1 drop Both Eyes Daily PRN Lisa Carty MD      levothyroxine  25 mcg Per G Tube QAM Lisa Carty MD      Loratadine  10 mg Oral Daily PRN Lisa Carty MD      LORazepam  0.5 mg Per G Tube HS Sonya Young MD      methenamine hippurate  1 g Oral BID Lisa Carty MD      omeprazole (PRILOSEC) suspension 2 mg/mL  20 mg Oral Daily Lisa Carty MD      oxybutynin  5 mg Per G Tube TID Lisa Carty MD      polyethylene glycol  17 g Oral Daily Lisa Carty MD      pravastatin  40 mg Per G Tube Daily With Dinner Lisa Carty MD      sodium chloride  75 mL/hr Intravenous  Jeffery Poole MD Stopped (09/04/24 0400)        Today, Patient Was Seen By: Silvana Mike DO  PGY-1    **Please Note: This note may have been constructed using a voice recognition system.**

## 2024-09-04 NOTE — CASE MANAGEMENT
Case Management Progress Note    Patient name Terrie Alicea  Location S /S -01 MRN [id number]  : 1960 Date 2024       LOS (days): 11  Geometric Mean LOS (GMLOS) (days):   Days to GMLOS:        OBJECTIVE:        Current admission status: Inpatient  Preferred Pharmacy:   [facility] - [address]  [address]  [address]  [place] [address]  Phone: [phone] Fax: [phone]    [facility]  [address]  [place] [address]  Phone: [phone] Fax: [phone]    [facility] - [place], [place] - [address]  [address] [address] [facility] [address]  Phone: [phone] Fax: [phone]    Primary Care Provider: Gunnar Sylvester DO    Primary Insurance: [employer] [facility]  Secondary Insurance: [place] [employer]    PROGRESS NOTE:  Patient discussed in weekly LOS meeting held this date. Assigned CM following for eventual return to group home when medically cleared.  Anticipating possible readiness for d/c in 48 hours.

## 2024-09-04 NOTE — PLAN OF CARE
Problem: Potential for Falls  Goal: Patient will remain free of falls  Description: INTERVENTIONS:  - Educate patient/family on patient safety including physical limitations  - Instruct patient to call for assistance with activity   - Consult OT/PT to assist with strengthening/mobility   - Keep Call bell within reach  - Keep bed low and locked with side rails adjusted as appropriate  - Keep care items and personal belongings within reach  - Initiate and maintain comfort rounds  - Make Fall Risk Sign visible to staff  - Apply yellow socks and bracelet for high fall risk patients  - Consider moving patient to room near nurses station  Outcome: Progressing     Problem: GASTROINTESTINAL - ADULT  Goal: Minimal or absence of nausea and/or vomiting  Description: INTERVENTIONS:  - Administer IV fluids if ordered to ensure adequate hydration  - Maintain NPO status until nausea and vomiting are resolved  - Nasogastric tube if ordered  - Administer ordered antiemetic medications as needed  - Provide nonpharmacologic comfort measures as appropriate  - Advance diet as tolerated, if ordered  - Consider nutrition services referral to assist patient with adequate nutrition and appropriate food choices  Outcome: Progressing

## 2024-09-04 NOTE — HOSPITAL COURSE
Terrie Alicea presented to the ED on 08/24/24 from group home with caregiver for poor PO intake, fever, and transient hypoxia. She had been discharged from her previous hospital admission on antibiotics for UTI and pneumonia. Upon admission, her home medications were continued, and the course of IV antibiotics was completed for the previously diagnosed UTI and pneumonia.     She underwent open gastrostomy tube placement on 08/26/24 without any immediate complications. She had been initiated on tube feeds (originally Jevity 1.5 per Nutrition's recommendations, with a goal rate of 70 cc/h from 6pm-6am to emulate the group home's environment). However, pt became hyperglycemic and there were concerns for refeeding syndrome although pt did not have any electrolyte abnormalities. Feed was changed to Glucerna 1.2 and pt was initiated on Lantus (initially 5u, titrated up gradually to 25u) at bedtime with improvement. On 8/30 she was noted to be hypotensive with minimal response to fluid bolus, albumin, and transferred to ICU step down level 1 with improvement on brief course of Levophed, after which she was able to return to [facility] floor. She then intermittently spiked fevers, original extensive workup was unremarkable, no leukocytosis or elevated procalcitonin. Pt was managed symptomatically. Repeat CT abdomen/pelvis showed aspiration pneumonia; pt was started on course of IV Rocephin with improvement. Pt had occasional hypotension that was managed with fluids, compression stockings, and midodrine 5 mg TID. Lantus was optimized at 18 units.    - CXR (8/24) - clear lungs with interval resolution of right basilar airspace opacity  - CT soft tissue neck w/ (8/24) - no acute findings  - CT C/A/P w/ (8/24) - cystitis with wall thickening of the urinary bladder and mucosal enhancement.  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  Increasing dependent density in the  lungs probably representing increasing atelectasis and possible superimposed aspiration.  Early aspiration pneumonia is not excluded.  - CT C/A/P wo (8/30) - findings compatible with with open PEG tube placement.  Persistent findings of cystitis.  - CXR (9/1) - no acute cardiopulmonary disease  - CTA C/A/P (9/3) - new opacification of right lower lobe bronchus with sequela of aspiration right lower lobe.  No acute findings in the abdomen and pelvis. Indeterminate 1.0 cm left renal interpolar lesion, for which dedicated renal sonogram is recommended on emergent basis.

## 2024-09-05 LAB
ALBUMIN SERPL BCG-MCNC: 3.1 G/DL (ref 3.5–5)
ALP SERPL-CCNC: 43 U/L (ref 34–104)
ALT SERPL W P-5'-P-CCNC: 8 U/L (ref 7–52)
ANION GAP SERPL CALCULATED.3IONS-SCNC: 3 MMOL/L (ref 4–13)
AST SERPL W P-5'-P-CCNC: 18 U/L (ref 13–39)
BASOPHILS # BLD AUTO: 0.04 THOUSANDS/ΜL (ref 0–0.1)
BASOPHILS NFR BLD AUTO: 1 % (ref 0–1)
BILIRUB SERPL-MCNC: 0.18 MG/DL (ref 0.2–1)
BUN SERPL-MCNC: 24 MG/DL (ref 5–25)
CALCIUM ALBUM COR SERPL-MCNC: 10.1 MG/DL (ref 8.3–10.1)
CALCIUM SERPL-MCNC: 9.4 MG/DL (ref 8.4–10.2)
CHLORIDE SERPL-SCNC: 101 MMOL/L (ref 96–108)
CO2 SERPL-SCNC: 35 MMOL/L (ref 21–32)
CREAT SERPL-MCNC: 0.88 MG/DL (ref 0.6–1.3)
DME PARACHUTE DELIVERY DATE REQUESTED: NORMAL
DME PARACHUTE DELIVERY DATE REQUESTED: NORMAL
DME PARACHUTE ITEM DESCRIPTION: NORMAL
DME PARACHUTE ORDER STATUS: NORMAL
DME PARACHUTE ORDER STATUS: NORMAL
DME PARACHUTE SUPPLIER NAME: NORMAL
DME PARACHUTE SUPPLIER NAME: NORMAL
DME PARACHUTE SUPPLIER PHONE: NORMAL
DME PARACHUTE SUPPLIER PHONE: NORMAL
EOSINOPHIL # BLD AUTO: 0.64 THOUSAND/ΜL (ref 0–0.61)
EOSINOPHIL NFR BLD AUTO: 11 % (ref 0–6)
ERYTHROCYTE [DISTWIDTH] IN BLOOD BY AUTOMATED COUNT: 14.1 % (ref 11.6–15.1)
GFR SERPL CREATININE-BSD FRML MDRD: 70 ML/MIN/1.73SQ M
GLUCOSE SERPL-MCNC: 106 MG/DL (ref 65–140)
GLUCOSE SERPL-MCNC: 127 MG/DL (ref 65–140)
GLUCOSE SERPL-MCNC: 163 MG/DL (ref 65–140)
GLUCOSE SERPL-MCNC: 170 MG/DL (ref 65–140)
GLUCOSE SERPL-MCNC: 65 MG/DL (ref 65–140)
GLUCOSE SERPL-MCNC: 98 MG/DL (ref 65–140)
HCT VFR BLD AUTO: 27.5 % (ref 34.8–46.1)
HGB BLD-MCNC: 8.7 G/DL (ref 11.5–15.4)
IMM GRANULOCYTES # BLD AUTO: 0.02 THOUSAND/UL (ref 0–0.2)
IMM GRANULOCYTES NFR BLD AUTO: 0 % (ref 0–2)
LACTATE SERPL-SCNC: 1 MMOL/L (ref 0.5–2)
LYMPHOCYTES # BLD AUTO: 2.12 THOUSANDS/ΜL (ref 0.6–4.47)
LYMPHOCYTES NFR BLD AUTO: 38 % (ref 14–44)
MCH RBC QN AUTO: 32.2 PG (ref 26.8–34.3)
MCHC RBC AUTO-ENTMCNC: 31.6 G/DL (ref 31.4–37.4)
MCV RBC AUTO: 102 FL (ref 82–98)
MONOCYTES # BLD AUTO: 0.52 THOUSAND/ΜL (ref 0.17–1.22)
MONOCYTES NFR BLD AUTO: 9 % (ref 4–12)
NEUTROPHILS # BLD AUTO: 2.27 THOUSANDS/ΜL (ref 1.85–7.62)
NEUTS SEG NFR BLD AUTO: 41 % (ref 43–75)
NRBC BLD AUTO-RTO: 0 /100 WBCS
PLATELET # BLD AUTO: 186 THOUSANDS/UL (ref 149–390)
PMV BLD AUTO: 11.9 FL (ref 8.9–12.7)
POTASSIUM SERPL-SCNC: 4.7 MMOL/L (ref 3.5–5.3)
PROCALCITONIN SERPL-MCNC: 0.09 NG/ML
PROT SERPL-MCNC: 5.7 G/DL (ref 6.4–8.4)
RBC # BLD AUTO: 2.7 MILLION/UL (ref 3.81–5.12)
SODIUM SERPL-SCNC: 139 MMOL/L (ref 135–147)
WBC # BLD AUTO: 5.61 THOUSAND/UL (ref 4.31–10.16)

## 2024-09-05 PROCEDURE — 99232 SBSQ HOSP IP/OBS MODERATE 35: CPT | Performed by: INTERNAL MEDICINE

## 2024-09-05 PROCEDURE — 85025 COMPLETE CBC W/AUTO DIFF WBC: CPT

## 2024-09-05 PROCEDURE — 84145 PROCALCITONIN (PCT): CPT

## 2024-09-05 PROCEDURE — 80053 COMPREHEN METABOLIC PANEL: CPT

## 2024-09-05 PROCEDURE — 82948 REAGENT STRIP/BLOOD GLUCOSE: CPT

## 2024-09-05 PROCEDURE — 83605 ASSAY OF LACTIC ACID: CPT

## 2024-09-05 RX ORDER — GLUCOSAMINE HCL/CHONDROITIN SU 500-400 MG
CAPSULE ORAL
Qty: 100 EACH | Refills: 0 | Status: SHIPPED | OUTPATIENT
Start: 2024-09-05 | End: 2024-09-05 | Stop reason: CLARIF

## 2024-09-05 RX ORDER — INSULIN ASPART 100 [IU]/ML
5 INJECTION, SOLUTION INTRAVENOUS; SUBCUTANEOUS
Qty: 4.5 ML | Refills: 0 | Status: SHIPPED | OUTPATIENT
Start: 2024-09-05 | End: 2024-09-05 | Stop reason: CLARIF

## 2024-09-05 RX ORDER — PEN NEEDLE, DIABETIC 32GX 5/32"
NEEDLE, DISPOSABLE MISCELLANEOUS
Qty: 100 EACH | Refills: 0 | Status: CANCELLED | OUTPATIENT
Start: 2024-09-05

## 2024-09-05 RX ORDER — BLOOD-GLUCOSE METER
KIT MISCELLANEOUS
Qty: 1 KIT | Refills: 0 | Status: SHIPPED | OUTPATIENT
Start: 2024-09-05 | End: 2024-09-05 | Stop reason: CLARIF

## 2024-09-05 RX ORDER — BLOOD SUGAR DIAGNOSTIC
STRIP MISCELLANEOUS
Qty: 100 EACH | Refills: 0 | Status: SHIPPED | OUTPATIENT
Start: 2024-09-05 | End: 2024-09-05 | Stop reason: CLARIF

## 2024-09-05 RX ORDER — BLOOD SUGAR DIAGNOSTIC
STRIP MISCELLANEOUS
Qty: 100 EACH | Refills: 0 | Status: ON HOLD | OUTPATIENT
Start: 2024-09-05

## 2024-09-05 RX ORDER — INSULIN LISPRO 100 [IU]/ML
4 INJECTION, SOLUTION INTRAVENOUS; SUBCUTANEOUS
Qty: 3.6 ML | Refills: 0 | Status: SHIPPED | OUTPATIENT
Start: 2024-09-05 | End: 2024-09-05

## 2024-09-05 RX ORDER — PEN NEEDLE, DIABETIC 32GX 5/32"
NEEDLE, DISPOSABLE MISCELLANEOUS
Qty: 100 EACH | Refills: 0 | Status: ON HOLD | OUTPATIENT
Start: 2024-09-05

## 2024-09-05 RX ORDER — MIDODRINE HYDROCHLORIDE 2.5 MG/1
2.5 TABLET ORAL
Status: DISCONTINUED | OUTPATIENT
Start: 2024-09-05 | End: 2024-09-05

## 2024-09-05 RX ORDER — INSULIN GLARGINE 100 [IU]/ML
25 INJECTION, SOLUTION SUBCUTANEOUS
Qty: 7.5 ML | Refills: 0 | Status: SHIPPED | OUTPATIENT
Start: 2024-09-05 | End: 2024-09-05 | Stop reason: CLARIF

## 2024-09-05 RX ORDER — LANCETS 33 GAUGE
EACH MISCELLANEOUS
Qty: 100 EACH | Refills: 0 | Status: SHIPPED | OUTPATIENT
Start: 2024-09-05 | End: 2024-09-05 | Stop reason: CLARIF

## 2024-09-05 RX ORDER — MIDODRINE HYDROCHLORIDE 5 MG/1
5 TABLET ORAL
Status: DISCONTINUED | OUTPATIENT
Start: 2024-09-06 | End: 2024-09-19 | Stop reason: HOSPADM

## 2024-09-05 RX ORDER — INSULIN GLARGINE 100 [IU]/ML
25 INJECTION, SOLUTION SUBCUTANEOUS
Qty: 7.5 ML | Refills: 0 | Status: SHIPPED | OUTPATIENT
Start: 2024-09-05 | End: 2024-09-19

## 2024-09-05 RX ORDER — GLUCOSAMINE HCL/CHONDROITIN SU 500-400 MG
CAPSULE ORAL
Qty: 100 EACH | Refills: 0 | Status: SHIPPED | OUTPATIENT
Start: 2024-09-05 | End: 2024-09-23 | Stop reason: SDUPTHER

## 2024-09-05 RX ORDER — PEN NEEDLE, DIABETIC 32GX 5/32"
NEEDLE, DISPOSABLE MISCELLANEOUS
Qty: 100 EACH | Refills: 0 | Status: SHIPPED | OUTPATIENT
Start: 2024-09-05 | End: 2024-09-05 | Stop reason: CLARIF

## 2024-09-05 RX ORDER — LANCETS 33 GAUGE
EACH MISCELLANEOUS
Qty: 100 EACH | Refills: 0 | Status: SHIPPED | OUTPATIENT
Start: 2024-09-05 | End: 2024-09-23 | Stop reason: SDUPTHER

## 2024-09-05 RX ORDER — BLOOD-GLUCOSE METER
KIT MISCELLANEOUS
Qty: 1 KIT | Refills: 0 | Status: SHIPPED | OUTPATIENT
Start: 2024-09-05 | End: 2024-09-23 | Stop reason: SDUPTHER

## 2024-09-05 RX ADMIN — Medication 20 MG: at 09:50

## 2024-09-05 RX ADMIN — INSULIN LISPRO 2 UNITS: 100 INJECTION, SOLUTION INTRAVENOUS; SUBCUTANEOUS at 00:28

## 2024-09-05 RX ADMIN — DOCOSANOL: 100 CREAM TOPICAL at 17:21

## 2024-09-05 RX ADMIN — VALPROIC ACID 125 MG: 250 SOLUTION ORAL at 22:14

## 2024-09-05 RX ADMIN — CEFTRIAXONE SODIUM 1000 MG: 10 INJECTION, POWDER, FOR SOLUTION INTRAVENOUS at 09:50

## 2024-09-05 RX ADMIN — CITALOPRAM HYDROBROMIDE 40 MG: 20 TABLET ORAL at 09:34

## 2024-09-05 RX ADMIN — CARBIDOPA AND LEVODOPA 1 TABLET: 25; 100 TABLET ORAL at 22:13

## 2024-09-05 RX ADMIN — MIDODRINE HYDROCHLORIDE 2.5 MG: 2.5 TABLET ORAL at 11:42

## 2024-09-05 RX ADMIN — FERROUS SULFATE 300 MG: 300 SOLUTION ORAL at 06:00

## 2024-09-05 RX ADMIN — OXYBUTYNIN CHLORIDE 5 MG: 5 TABLET ORAL at 22:10

## 2024-09-05 RX ADMIN — POLYETHYLENE GLYCOL 3350 17 G: 17 POWDER, FOR SOLUTION ORAL at 09:34

## 2024-09-05 RX ADMIN — LORAZEPAM 0.5 MG: 0.5 TABLET ORAL at 22:11

## 2024-09-05 RX ADMIN — ARIPIPRAZOLE 2 MG: 2 TABLET ORAL at 22:10

## 2024-09-05 RX ADMIN — METHENAMINE HIPPURATE 1 G: 1 TABLET ORAL at 09:33

## 2024-09-05 RX ADMIN — DOCOSANOL: 100 CREAM TOPICAL at 14:45

## 2024-09-05 RX ADMIN — CALCIUM CARBONATE (ANTACID) CHEW TAB 500 MG 750 MG: 500 CHEW TAB at 22:11

## 2024-09-05 RX ADMIN — OXYBUTYNIN CHLORIDE 5 MG: 5 TABLET ORAL at 09:34

## 2024-09-05 RX ADMIN — CARBIDOPA AND LEVODOPA 1 TABLET: 25; 100 TABLET ORAL at 17:19

## 2024-09-05 RX ADMIN — FERROUS SULFATE 300 MG: 300 SOLUTION ORAL at 17:19

## 2024-09-05 RX ADMIN — METHENAMINE HIPPURATE 1 G: 1 TABLET ORAL at 17:20

## 2024-09-05 RX ADMIN — ENOXAPARIN SODIUM 40 MG: 40 INJECTION SUBCUTANEOUS at 09:34

## 2024-09-05 RX ADMIN — Medication 1000 UNITS: at 09:34

## 2024-09-05 RX ADMIN — OXYBUTYNIN CHLORIDE 5 MG: 5 TABLET ORAL at 17:19

## 2024-09-05 RX ADMIN — INSULIN LISPRO 1 UNITS: 100 INJECTION, SOLUTION INTRAVENOUS; SUBCUTANEOUS at 07:40

## 2024-09-05 RX ADMIN — INSULIN GLARGINE 25 UNITS: 100 INJECTION, SOLUTION SUBCUTANEOUS at 22:12

## 2024-09-05 RX ADMIN — BACLOFEN 10 MG: 10 TABLET ORAL at 09:34

## 2024-09-05 RX ADMIN — CARBIDOPA AND LEVODOPA 1 TABLET: 25; 100 TABLET ORAL at 09:35

## 2024-09-05 RX ADMIN — VALPROIC ACID 125 MG: 250 SOLUTION ORAL at 09:34

## 2024-09-05 RX ADMIN — OXYCODONE HYDROCHLORIDE AND ACETAMINOPHEN 1000 MG: 500 TABLET ORAL at 09:33

## 2024-09-05 RX ADMIN — PRAVASTATIN SODIUM 40 MG: 40 TABLET ORAL at 17:19

## 2024-09-05 RX ADMIN — CALCIUM CARBONATE (ANTACID) CHEW TAB 500 MG 750 MG: 500 CHEW TAB at 09:34

## 2024-09-05 RX ADMIN — LEVOTHYROXINE SODIUM 25 MCG: 25 TABLET ORAL at 09:34

## 2024-09-05 RX ADMIN — BACLOFEN 10 MG: 10 TABLET ORAL at 17:19

## 2024-09-05 RX ADMIN — DOCOSANOL: 100 CREAM TOPICAL at 06:02

## 2024-09-05 RX ADMIN — DOCOSANOL: 100 CREAM TOPICAL at 11:30

## 2024-09-05 RX ADMIN — BACLOFEN 10 MG: 10 TABLET ORAL at 22:11

## 2024-09-05 RX ADMIN — SODIUM CHLORIDE 500 ML: 0.9 INJECTION, SOLUTION INTRAVENOUS at 11:42

## 2024-09-05 RX ADMIN — DOCOSANOL 1 APPLICATION: 100 CREAM TOPICAL at 22:14

## 2024-09-05 RX ADMIN — CARBIDOPA AND LEVODOPA 1 TABLET: 25; 100 TABLET ORAL at 11:42

## 2024-09-05 NOTE — ASSESSMENT & PLAN NOTE
Pt noted to be hypotensive intermittently throughout hospitalization. Per caregivers and record review, pt's BP tends to run on softer side. Today RN informed team pt had BP of 82/51 and similar read on repeat; pt was then given 500 cc NS bolus with resolution. Not associated with tachycardia, tachypnea, distress. Physical exam at baseline.    - Midodrine 5 mg TID  - Compression stockings  - VS q4h  - current water flush regimen: 60 mL before & after TF cycle; 70 mL q2h

## 2024-09-05 NOTE — PROGRESS NOTES
"[facility]  Progress Note  Name: Terrie Alicea I  MRN: [id number]  Unit/Bed#: S -01 I Date of Admission: 8/24/2024   Date of Service: 9/5/2024 I Hospital Day: 12    Assessment & Plan   Fever of unknown origin, likely aspiration pneumonia  Assessment & Plan  On 9/1 pt noted to be spiking fevers intermittently. Tmax recorded as 102.4F on 9/3. CXR 9/1 was ordered to r/o aspiration PNA vs atelectasis; showed no acute cardiopulmonary process, although did reveal dilated bowel loops in LUQ. However, pt is passing flatus and having bowel movements. CTAP 8/30 showed postoperative air compatible with open g-tube placement, no evidence of perforation. Repeat skin exam 9/3 without any acutely infected ulcers, disruption of skin integrity, injuries, tenderness. Site of g-tube is healing appropriately. Pt is unable to participate in incentive spirometry for atelectasis prophylaxis. COVID/Flu/RSV and UA negative 9/3. Blood cultures negative. Procalcitonin & lactic acid unremarkable, no leukocytosis. Per report from CT chest, abdomen, pelvis w contrast report 9/3 shows new opacification of \"R lower lobe bronchus, R atelectasis, w sequela of aspiration in R lower lobe. Initiated IV Rocephin 1 g on 9/4. Pt afebrile, no tachycardia, no tachypnea.      Plan:  - Continue IV Rocephin 1 g. Will switch to PO Keflex upon discharge.  - Symptomatic relief w IVPB Ofirmev 630 q8h, and ice packs  - VS q4h    * Dysphagia  Assessment & Plan  CT neck no organic obstructions in the mediastinum. Pt has had poor PO intake, malnutrition requiring gastrostomy tube placement on 08/26. Per group home, pt attends day program which cannot manage tube feeds so requesting her to receive it from 6pm to 6am. Settings adjusted to take this into consideration per nutrition.     Plan:  Per Nutrition: Recommend trial cyclic feeds of Glucerna 1.2 @ 80 ml/hr X 12 hrs (Run 6 pm-6am). This provides 960 ml total volume, 1152 kcals, 58 " g protein, and 109 g CHO. If blood sugars remain elevated on cyclic feeds, switch to continuous feeds of Glucerna 1.2 @ 40 ml/hr X 24 hrs to see if lower volume/more time improves blood sugars. Re-consult RD as needed.   Continue Glucerna 1.2 as tolerated per nutrition   60 mL water flush before & after TF cycle  70 mL q2h water flushes  Per SLP: can introduce pleasure feeds or puree & HTL as tolerated    DM (diabetes mellitus) ([facility])  Assessment & Plan  Lab Results   Component Value Date    HGBA1C 6.2 (H) 08/13/2024     Recent Labs     09/03/24  1537 09/03/24  1756 09/04/24  0009 09/04/24  0756   POCGLU 72 106 225* 244*     Blood Sugar Average: Last 72 hrs:  (P) 213    PMHx of diabetes. Pt having hyperglycemic episodes in the setting of tube feeds, possibly related to refeeding syndrome although pt has not had hypokalemia, hypomagnesemia, hypophosphatemia. Required insulin drip on 8/29 as BG>500, discontinued when BG<200. However, pt became hyperglycemic overnight 8/30 in the setting of tube feeds, MAP ~54 w minimal response to fluids and albumin, transferred to ICU. Per review of crit care notes, pt was briefly on Levophed; low suspicion of infectious etiology as pt has not had leukocytosis and has been afebrile, imaging unremarkable. Pt stable, nutrition switched feed from Jevity 1.5 to Glucerna 1.2, transferred back to med/surg on 8/31. Pt noted to be hyperglycemic, could be attributable to underlying infectious process as well. Pt doing well on current insulin regimen.     Plan:  - Continue Lantus to 25u q HS  - Continue insulin sliding scale, algorithm 3  - BG checks q6h  - Repeat CMP    Hypotension  Assessment & Plan  Pt noted to be hypotensive intermittently throughout hospitalization. Per caregivers and record review, pt's BP tends to run on softer side. Today RN informed team pt had BP of 82/51 and similar read on repeat; pt was then given 500 cc NS bolus with resolution. Not associated with tachycardia,  tachypnea, distress. Physical exam at baseline.    - Midodrine 2.5 mg TID  - Compression stockings  - VS q4h  - current water flush regimen: 60 mL before & after TF cycle; 70 mL q2h    Mood disturbances  Assessment & Plan  - Continue home meds: Abilify 2 mg, Citalopram 40 mg.  - Pt previously on trazodone 100 mg q HS which was discontinued on 8/30 while in the ICU d/t contributions to sedation.    - Continue Depakene oral solution 125 mg q12h for mood, can defer changes per outpatient psychiatry as deemed necessary.  - Pt has been stable from this standpoint throughout hospitalization, hasn't required restarting of trazodone or escalation in valproic acid thus far.     Parkinson's disease  Assessment & Plan  History of mood disturbances, previously on neuroleptics. Tardive dyskinesia on exam.    Sinemet  qid. Can defer changes to outpatient neurology.     Incontinence  Assessment & Plan  Continue oxybutynin 5 mg TID    UTI (urinary tract infection)  Assessment & Plan  Previous culture 8/20-grew E. coli, pansensitive. CT abdomen pelvis demonstrated wall thickening of urinary bladder. Concurrent aspiration pneumonia. Pt completed course of IV abx at that time. Presently, pt has fever of unknown origin. Repeat UA unremarkable 9/3.      Cerebral palsy (HCC)  Assessment & Plan  Continue baclofen 10 mg TID          VTE Pharmacologic Prophylaxis: VTE Score: 7 High Risk (Score >/= 5) - Pharmacological DVT Prophylaxis Ordered: enoxaparin (Lovenox). Sequential Compression Devices Ordered.    Mobility:   Basic Mobility Inpatient Raw Score: 6  JH-HLM Goal: 2: Bed activities/Dependent transfer  JH-HLM Achieved: 1: Laying in bed  JH-HLM Goal achieved. Continue to encourage appropriate mobility.    Patient Centered Rounds: I performed bedside rounds with nursing staff today.  Discussions with Specialists or Other Care Team Provider:       Education and Discussions with Family / Patient: Updated  (caregivers) at  bedside.    Current Length of Stay: 12 day(s)  Current Patient Status: Inpatient   Discharge Plan: Anticipate discharge in 24-48 hrs to group home.     Code Status: Level 1 - Full Code    Subjective:   Pt seen and examined at bedside today. Per report, pt noted to be hypotensive earlier this morning; repeat manual BPs were adequate. Pt is seen laying in bed, awake, not appearing in any acute distress. Physical examination at baseline.     Later in the morning, RN notified team pt BP 82/51; pt was given 500 cc NS bolus, midodrine, and compression stockings. Repeat BP wnl. Pt is not tachycardic, tachypneic, or lethargic.     Objective:     Vitals:   Temp (24hrs), Av.5 °F (36.9 °C), Min:98.4 °F (36.9 °C), Max:98.6 °F (37 °C)    Temp:  [98.4 °F (36.9 °C)-98.6 °F (37 °C)] 98.4 °F (36.9 °C)  HR:  [57-85] 60  Resp:  [18] 18  BP: ()/(42-58) 91/58  SpO2:  [92 %-96 %] 96 %  Body mass index is 20.41 kg/m².     Input and Output Summary (last 24 hours):     Intake/Output Summary (Last 24 hours) at 2024 1453  Last data filed at 2024 0600  Gross per 24 hour   Intake 0 ml   Output 0 ml   Net 0 ml       Physical Exam:   Physical Exam  Vitals reviewed.   Constitutional:       General: She is awake. She is not in acute distress.     Interventions: Nasal cannula in place.   HENT:      Right Ear: External ear normal.      Left Ear: External ear normal.      Mouth/Throat:      Comments: Lip smacking (chronic)  Cardiovascular:      Rate and Rhythm: Normal rate and regular rhythm.   Pulmonary:      Effort: No tachypnea or respiratory distress.      Breath sounds: No stridor.   Abdominal:      General: Bowel sounds are normal.      Tenderness: There is no abdominal tenderness. There is no guarding.      Comments: G-tube in place   Musculoskeletal:         General: Deformity present. No swelling or tenderness.   Skin:     General: Skin is warm.      Capillary Refill: Capillary refill takes less than 2 seconds.       Coloration: Skin is not jaundiced.      Findings: No erythema or rash.   Neurological:      Mental Status: Mental status is at baseline.         Additional Data:     Labs:  Results from last 7 days   Lab Units 09/05/24  0440   WBC Thousand/uL 5.61   HEMOGLOBIN g/dL 8.7*   HEMATOCRIT % 27.5*   PLATELETS Thousands/uL 186   SEGS PCT % 41*   LYMPHO PCT % 38   MONO PCT % 9   EOS PCT % 11*     Results from last 7 days   Lab Units 09/05/24  0440   SODIUM mmol/L 139   POTASSIUM mmol/L 4.7   CHLORIDE mmol/L 101   CO2 mmol/L 35*   BUN mg/dL 24   CREATININE mg/dL 0.88   ANION GAP mmol/L 3*   CALCIUM mg/dL 9.4   ALBUMIN g/dL 3.1*   TOTAL BILIRUBIN mg/dL 0.18*   ALK PHOS U/L 43   ALT U/L 8   AST U/L 18   GLUCOSE RANDOM mg/dL 127         Results from last 7 days   Lab Units 09/05/24  1252 09/05/24  0701 09/05/24  0557 09/04/24  2353 09/04/24  1815 09/04/24  1155 09/04/24  0756 09/04/24  0009 09/03/24  1756 09/03/24  1537 09/03/24  1135 09/03/24  0757   POC GLUCOSE mg/dl 98 163* 170* 195* 89 121 244* 225* 106 72 98 119         Results from last 7 days   Lab Units 09/05/24  0440 09/03/24  0323 09/02/24  0456 09/01/24  0349 08/30/24  0512 08/30/24  0345   LACTIC ACID mmol/L 1.0 1.8  --  1.1 2.2* 2.8*   PROCALCITONIN ng/ml 0.09  --  0.12  --   --  0.22       Lines/Drains:  Invasive Devices       Peripheral Intravenous Line  Duration             Peripheral IV 09/03/24 Distal;Left;Ventral (anterior) Forearm 2 days              Drain  Duration             Gastrostomy/Enterostomy  16 Fr. LUQ 9 days                      Imaging: Reviewed radiology reports from this admission including: chest xray and abdominal/pelvic CT    Recent Cultures (last 7 days):   Results from last 7 days   Lab Units 09/03/24  0324 08/30/24  0523   BLOOD CULTURE  No Growth at 48 hrs.  No Growth at 48 hrs. No Growth After 5 Days.  No Growth After 5 Days.       Last 24 Hours Medication List:   Current Facility-Administered Medications   Medication Dose Route  Frequency Provider Last Rate    acetaminophen  15 mg/kg Intravenous Q8H PRN Reg [place]  mg (09/03/24 0038)    ammonium lactate   Topical BID PRN Lisa Carty MD      ARIPiprazole  2 mg Per G Tube HS Lisa Carty MD      artificial tear   Both Eyes HS PRN Lisa Carty MD      vitamin C  1,000 mg Per G Tube Daily Lisa Carty MD      baclofen  10 mg Per G Tube TID Lisa Carty MD      calcium carbonate  750 mg Oral BID Lisa Carty MD      carbamide peroxide  2 drop Both Ears BID PRN Lisa Carty MD      carbidopa-levodopa  1 tablet Oral 4x Daily Lisa Carty MD      cefTRIAXone  1,000 mg Intravenous Q24H Karen Moreno MD 1,000 mg (09/05/24 0950)    cholecalciferol  1,000 Units Per G Tube Daily Lisa Carty MD      citalopram  40 mg Per G Tube Daily Lisa Carty MD      dextromethorphan-guaiFENesin  10 mL Per G Tube Q4H PRN Lisa Carty MD      docosanol   Topical 5x Daily Lisa Carty MD      enoxaparin  40 mg Subcutaneous Daily Lisa Carty MD      Ferrous Sulfate  300 mg Oral BID AC Lisa Carty MD      fluticasone  1 spray Nasal Daily PRN Lisa Carty MD      hydrocortisone   Topical 4x Daily PRN Lisa Carty MD      insulin glargine  25 Units Subcutaneous HS Karen Moreno MD      insulin lispro  1-6 Units Subcutaneous Q6H [facility] Lisa Carty MD      Ketotifen Fumarate  1 drop Both Eyes Daily PRN Lisa Carty MD      levothyroxine  25 mcg Per G Tube QAM Lisa Carty MD      Loratadine  10 mg Oral Daily PRN Lisa Carty MD      LORazepam  0.5 mg Per G Tube HS Sonya Young MD      methenamine hippurate  1 g Oral BID Lisa Carty MD      midodrine  2.5 mg Oral TID AC Silvana Mike DO      omeprazole (PRILOSEC) suspension 2 mg/mL  20 mg Oral Daily Lisa Carty MD      oxybutynin  5 mg Per G Tube TID Lisa Carty MD      polyethylene glycol  17 g Oral Daily Lisa Carty MD      pravastatin  40 mg Per G Tube Daily With Dinner Lisa Carty MD      valproic acid  125 mg Oral Q12H ROSEMARY Mike DO          Today, Patient Was Seen  By: Silvana Mike,   PGY-1    **Please Note: This note may have been constructed using a voice recognition system.**

## 2024-09-05 NOTE — CASE MANAGEMENT
Case Management Discharge Planning Note    Patient name Terrie Alicea  Location S /S -01 MRN [id number]  : 1960 Date 2024       Current Admission Date: 2024  Current Admission Diagnosis:Dysphagia   Patient Active Problem List    Diagnosis Date Noted Date Diagnosed    Fever of unknown origin, likely aspiration pneumonia 2024     Mood disturbances 2024     Severe protein-calorie malnutrition (HCC) 2024     Functional quadriplegia (HCC) 2024     Protein-calorie malnutrition, unspecified severity (HCC) 2024     Hospital discharge follow-up 2024     SIRS (systemic inflammatory response syndrome) ([facility]) 2024     Developmental delay      Preop examination 2023     Cataract 2023     Internal hemorrhoids 2023     Contracture of right hand 2023     Need for shingles vaccine 2023     Viral illness 2022     Dystonic cerebral palsy (HCC) 2022     Cervical dystonia 2022     Parkinson's disease 2022     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HCC) 10/18/2021     Polyneuropathy associated with underlying disease ([facility]) 10/18/2021     Hypercalcemia 2021     Herpes zoster without complication 2021     Abnormal finding on lung imaging 2021     Anemia 2020     Eosinophilic leukocytosis 2020     Underweight 2020     Dysphagia 2020     History of vitamin D deficiency 2020     History of fall 10/28/2019     Breast asymmetry 2019     Hypernatremia 2018     Severe sepsis (HCC) 2018     Gait disturbance 2018     Osteoarthritis of both hips 2018     Severe Intellectual disability 2018     DM (diabetes mellitus) (HCC) 2018     Abnormal albumin 2017     Peripheral neuropathy 2017     Seasonal allergies 2017     Physical deconditioning 2017     Hyperlipidemia 2017     Gastroesophageal reflux disease 2017      Cerebral palsy (HCC) 03/27/2017     Spasticity 03/27/2017     Ambulatory dysfunction 03/27/2017     UTI (urinary tract infection) 03/27/2017     Bipolar disorder (HCC) 03/27/2017     Hypotension (SIRS vs Sepsis) 04/11/2016     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     Weight loss 01/21/2016     Osteoporosis 01/19/2016     Resting tremor 01/19/2016     Acute metabolic encephalopathy 12/11/2015     Gait abnormality 12/11/2015     Platelets decreased ([facility]) 07/31/2015     Other chronic pain 07/30/2015     Postmenopausal vaginal bleeding 07/23/2015     Anxiety 07/15/2015     Menopause 09/25/2014     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/28/2014     Atopic dermatitis 06/02/2014     Dry eye 06/02/2014     Constipation 04/12/2013     Iron deficiency anemia 03/27/2013     Incontinence 03/27/2013     Hypothyroidism 01/10/2013       LOS (days): 12  Geometric Mean LOS (GMLOS) (days):   Days to GMLOS:     OBJECTIVE:  Risk of Unplanned Readmission Score: 32.42         Current admission status: Inpatient   Preferred Pharmacy:   [facility] [place] [facility] [place], [place] - [address]  [address]  [address]  [place] [address]  Phone: [phone] Fax: [phone]    [facility]  [address]  [place] [address]  Phone: [phone] Fax: [phone]    [facility] [place] - [address]  [address] [address]  Phone: [phone] Fax: [phone]    [facility] ([place] - [place] - [address]  [address]  [place] [address]  Phone: [phone] Fax: [phone]    Primary Care Provider: Gunnar Sylvester DO    Primary Insurance: [employer]  REP  Secondary Insurance: [place] [employer]    DISCHARGE DETAILS:    Discharge planning discussed with:: Lisa ePnny  Freedom of Choice: Yes  Comments - Freedom of Choice: Return to group home  CM contacted family/caregiver?:  Yes (Caregiver present at bedside)  Were Treatment Team discharge recommendations reviewed with patient/caregiver?: Yes  Did patient/caregiver verbalize understanding of patient care needs?: Yes  Were patient/caregiver advised of the risks associated with not following Treatment Team discharge recommendations?: Yes    Contacts  Patient Contacts: Lisa Penny (Care Giver)  Contact Method: In Person  Reason/Outcome: Discharge Planning, Emergency Contact, Continuity of Care    Requested Home Health Care         Is the patient interested in HHC at discharge?: No    DME Referral Provided  Referral made for DME?: No    Other Referral/Resources/Interventions Provided:  Interventions: Other (Specify), Group Home  Referral Comments: CM spoke with pt group home caregiver, Lisa, at bedside to f/u on dcp. Pt primary nurse also present. Lisa aware updated tube feed supplies have been ordered via [employer] pending delivery. Lisa aware that has per medical team, pt is nearing medical stability. Lisa had several questions regarding pt insulin regimen. Lisa reported she needs to train her staff on insulin management. Lisa reported she is comfortable with administering pt insulin and tube feeds. Lisa requested to speak with provider regarding medical questions, CM sent secure chat to provider to notify of same. Lisa verified pt is at her baseline level of function however is requesting a PT/OT eval. Provider notified of same. CM to complete price check of insulin supplies once sent to pharmacy. Lisa reported she will provide transportation back to group home, denied CM offer to set up transport. CM later went back to speak with caregiver with [facility] provider. Caregiver again reported she is the only nurse on staff until Sunday when the night shift nurse comes back to work and group home is still in the process of providing insulin training. CM reviewed IMM, provided copy for caregiver records.  Aware CM will continue to follow to assist with dcp.      Treatment Team Recommendation: Group Home  Discharge Destination Plan:: Group Home  Transport at Discharge : Other (Comment) (Group home)

## 2024-09-06 LAB
ANION GAP SERPL CALCULATED.3IONS-SCNC: 7 MMOL/L (ref 4–13)
BUN SERPL-MCNC: 28 MG/DL (ref 5–25)
CALCIUM SERPL-MCNC: 10.1 MG/DL (ref 8.4–10.2)
CHLORIDE SERPL-SCNC: 99 MMOL/L (ref 96–108)
CO2 SERPL-SCNC: 32 MMOL/L (ref 21–32)
CREAT SERPL-MCNC: 0.88 MG/DL (ref 0.6–1.3)
ERYTHROCYTE [DISTWIDTH] IN BLOOD BY AUTOMATED COUNT: 13.9 % (ref 11.6–15.1)
GFR SERPL CREATININE-BSD FRML MDRD: 70 ML/MIN/1.73SQ M
GLUCOSE SERPL-MCNC: 127 MG/DL (ref 65–140)
GLUCOSE SERPL-MCNC: 145 MG/DL (ref 65–140)
GLUCOSE SERPL-MCNC: 175 MG/DL (ref 65–140)
GLUCOSE SERPL-MCNC: 176 MG/DL (ref 65–140)
GLUCOSE SERPL-MCNC: 180 MG/DL (ref 65–140)
GLUCOSE SERPL-MCNC: 52 MG/DL (ref 65–140)
GLUCOSE SERPL-MCNC: 89 MG/DL (ref 65–140)
GLUCOSE SERPL-MCNC: 90 MG/DL (ref 65–140)
HCT VFR BLD AUTO: 28.6 % (ref 34.8–46.1)
HGB BLD-MCNC: 9.2 G/DL (ref 11.5–15.4)
MCH RBC QN AUTO: 32.7 PG (ref 26.8–34.3)
MCHC RBC AUTO-ENTMCNC: 32.2 G/DL (ref 31.4–37.4)
MCV RBC AUTO: 102 FL (ref 82–98)
PLATELET # BLD AUTO: 206 THOUSANDS/UL (ref 149–390)
PMV BLD AUTO: 12.2 FL (ref 8.9–12.7)
POTASSIUM SERPL-SCNC: 4.7 MMOL/L (ref 3.5–5.3)
RBC # BLD AUTO: 2.81 MILLION/UL (ref 3.81–5.12)
SODIUM SERPL-SCNC: 138 MMOL/L (ref 135–147)
WBC # BLD AUTO: 6.26 THOUSAND/UL (ref 4.31–10.16)

## 2024-09-06 PROCEDURE — 97530 THERAPEUTIC ACTIVITIES: CPT

## 2024-09-06 PROCEDURE — 99232 SBSQ HOSP IP/OBS MODERATE 35: CPT | Performed by: INTERNAL MEDICINE

## 2024-09-06 PROCEDURE — 97163 PT EVAL HIGH COMPLEX 45 MIN: CPT

## 2024-09-06 PROCEDURE — 85027 COMPLETE CBC AUTOMATED: CPT

## 2024-09-06 PROCEDURE — 97167 OT EVAL HIGH COMPLEX 60 MIN: CPT

## 2024-09-06 PROCEDURE — 80048 BASIC METABOLIC PNL TOTAL CA: CPT

## 2024-09-06 PROCEDURE — 82948 REAGENT STRIP/BLOOD GLUCOSE: CPT

## 2024-09-06 RX ORDER — INSULIN GLARGINE 100 [IU]/ML
18 INJECTION, SOLUTION SUBCUTANEOUS
Status: DISCONTINUED | OUTPATIENT
Start: 2024-09-06 | End: 2024-09-07

## 2024-09-06 RX ORDER — DEXTROSE MONOHYDRATE 25 G/50ML
25 INJECTION, SOLUTION INTRAVENOUS ONCE
Status: COMPLETED | OUTPATIENT
Start: 2024-09-06 | End: 2024-09-06

## 2024-09-06 RX ORDER — INSULIN GLARGINE 100 [IU]/ML
17 INJECTION, SOLUTION SUBCUTANEOUS
Status: DISCONTINUED | OUTPATIENT
Start: 2024-09-06 | End: 2024-09-06

## 2024-09-06 RX ORDER — DIPHENHYDRAMINE HYDROCHLORIDE 25 MG/1
CAPSULE, LIQUID FILLED ORAL 3 TIMES DAILY
Qty: 1 KIT | Refills: 0 | Status: SHIPPED | OUTPATIENT
Start: 2024-09-06 | End: 2024-09-06 | Stop reason: SDUPTHER

## 2024-09-06 RX ADMIN — OXYBUTYNIN CHLORIDE 5 MG: 5 TABLET ORAL at 08:37

## 2024-09-06 RX ADMIN — FERROUS SULFATE 300 MG: 300 SOLUTION ORAL at 06:05

## 2024-09-06 RX ADMIN — PRAVASTATIN SODIUM 40 MG: 40 TABLET ORAL at 17:19

## 2024-09-06 RX ADMIN — ENOXAPARIN SODIUM 40 MG: 40 INJECTION SUBCUTANEOUS at 08:37

## 2024-09-06 RX ADMIN — VALPROIC ACID 125 MG: 250 SOLUTION ORAL at 08:37

## 2024-09-06 RX ADMIN — INSULIN LISPRO 1 UNITS: 100 INJECTION, SOLUTION INTRAVENOUS; SUBCUTANEOUS at 00:30

## 2024-09-06 RX ADMIN — DEXTROSE MONOHYDRATE 25 ML: 25 INJECTION, SOLUTION INTRAVENOUS at 13:39

## 2024-09-06 RX ADMIN — DOCOSANOL: 100 CREAM TOPICAL at 23:28

## 2024-09-06 RX ADMIN — POLYETHYLENE GLYCOL 3350 17 G: 17 POWDER, FOR SOLUTION ORAL at 08:37

## 2024-09-06 RX ADMIN — MIDODRINE HYDROCHLORIDE 5 MG: 5 TABLET ORAL at 17:19

## 2024-09-06 RX ADMIN — CARBIDOPA AND LEVODOPA 1 TABLET: 25; 100 TABLET ORAL at 08:37

## 2024-09-06 RX ADMIN — CARBIDOPA AND LEVODOPA 1 TABLET: 25; 100 TABLET ORAL at 23:12

## 2024-09-06 RX ADMIN — FERROUS SULFATE 300 MG: 300 SOLUTION ORAL at 17:19

## 2024-09-06 RX ADMIN — DOCOSANOL: 100 CREAM TOPICAL at 11:00

## 2024-09-06 RX ADMIN — LEVOTHYROXINE SODIUM 25 MCG: 25 TABLET ORAL at 08:37

## 2024-09-06 RX ADMIN — DOCOSANOL: 100 CREAM TOPICAL at 14:34

## 2024-09-06 RX ADMIN — CITALOPRAM HYDROBROMIDE 40 MG: 20 TABLET ORAL at 08:37

## 2024-09-06 RX ADMIN — ARIPIPRAZOLE 2 MG: 2 TABLET ORAL at 23:13

## 2024-09-06 RX ADMIN — BACLOFEN 10 MG: 10 TABLET ORAL at 17:19

## 2024-09-06 RX ADMIN — VALPROIC ACID 125 MG: 250 SOLUTION ORAL at 23:12

## 2024-09-06 RX ADMIN — Medication 1000 UNITS: at 08:37

## 2024-09-06 RX ADMIN — OXYCODONE HYDROCHLORIDE AND ACETAMINOPHEN 1000 MG: 500 TABLET ORAL at 08:37

## 2024-09-06 RX ADMIN — CALCIUM CARBONATE (ANTACID) CHEW TAB 500 MG 750 MG: 500 CHEW TAB at 23:12

## 2024-09-06 RX ADMIN — CARBIDOPA AND LEVODOPA 1 TABLET: 25; 100 TABLET ORAL at 17:19

## 2024-09-06 RX ADMIN — INSULIN GLARGINE 18 UNITS: 100 INJECTION, SOLUTION SUBCUTANEOUS at 23:11

## 2024-09-06 RX ADMIN — BACLOFEN 10 MG: 10 TABLET ORAL at 23:12

## 2024-09-06 RX ADMIN — METHENAMINE HIPPURATE 1 G: 1 TABLET ORAL at 17:20

## 2024-09-06 RX ADMIN — INSULIN LISPRO 1 UNITS: 100 INJECTION, SOLUTION INTRAVENOUS; SUBCUTANEOUS at 05:46

## 2024-09-06 RX ADMIN — Medication 20 MG: at 08:38

## 2024-09-06 RX ADMIN — MIDODRINE HYDROCHLORIDE 5 MG: 5 TABLET ORAL at 06:05

## 2024-09-06 RX ADMIN — DOCOSANOL: 100 CREAM TOPICAL at 17:20

## 2024-09-06 RX ADMIN — DOCOSANOL 1 APPLICATION: 100 CREAM TOPICAL at 06:11

## 2024-09-06 RX ADMIN — LORAZEPAM 0.5 MG: 0.5 TABLET ORAL at 23:13

## 2024-09-06 RX ADMIN — CEFTRIAXONE SODIUM 1000 MG: 10 INJECTION, POWDER, FOR SOLUTION INTRAVENOUS at 09:28

## 2024-09-06 RX ADMIN — CALCIUM CARBONATE (ANTACID) CHEW TAB 500 MG 750 MG: 500 CHEW TAB at 08:37

## 2024-09-06 RX ADMIN — OXYBUTYNIN CHLORIDE 5 MG: 5 TABLET ORAL at 23:13

## 2024-09-06 RX ADMIN — OXYBUTYNIN CHLORIDE 5 MG: 5 TABLET ORAL at 17:19

## 2024-09-06 RX ADMIN — METHENAMINE HIPPURATE 1 G: 1 TABLET ORAL at 08:39

## 2024-09-06 RX ADMIN — CARBIDOPA AND LEVODOPA 1 TABLET: 25; 100 TABLET ORAL at 12:21

## 2024-09-06 RX ADMIN — MIDODRINE HYDROCHLORIDE 5 MG: 5 TABLET ORAL at 12:21

## 2024-09-06 RX ADMIN — BACLOFEN 10 MG: 10 TABLET ORAL at 08:37

## 2024-09-06 NOTE — DISCHARGE INSTR - AVS FIRST PAGE
Dear Terrie Alicea,     It was our pleasure to care for you here at [facility].  It is our hope that we were always able to exceed the expected standards for your care during your stay.  You were hospitalized due to fever, altered mental status.  You were cared for on the 3rd floor by Yolanda Kay DO under the service of Amandeep Anderson MD with the [facility]ist [facility] who covers for your primary care physician (PCP), Gunnar Sylvester DO, while you were hospitalized.  If you have any questions or concerns related to this hospitalization, you may contact us at .  For follow up as well as any medication refills, we recommend that you follow up with your primary care physician.  A registered nurse will reach out to you by phone within a few days after your discharge to answer any additional questions that you may have after going home.  However, at this time we provide for you here, the most important instructions / recommendations at discharge:     Notable Medication Adjustments -   Stop taking glimepiride and metformin.   Start taking insulin regular 4 units every 6 hours.  Start taking insulin regular sliding scale as follows every 6 hours:              Blood Glucose 150 - 224: 1 Unit of Insulin              Blood Glucose 225 - 299: 2 Units of Insulin              Blood Glucose 300 - 374: 3 Units of Insulin              Blood Glucose 375 - 449: 4 Units of Insulin              Blood Glucose greater than or equal to 450: 5 Units of Insulin  Start taking famotidine 20 mg in the morning. This is because Prilosec prescription is not covered by your insurance. Prevacid is not available in liquid form.   Start taking midodrine 5 mg three times per day.  Start taking valproic acid 125 mg two times per day.  Start taking Ferrous sulfate solution 300 mg daily.  Stop taking levothyroxine first thing in the morning. Start taking levothyroxine 1 hour before the start of  tube feeds (at 5:00 pm).  Continue Glucerna 1.2 tube feeds 80 mL/hr from 6 pm to 6 am with 125 mL water tube feed flushes every 4 hours.  For low blood sugar levels, you may give glucose 4 g tablets through PEG tube as necessary to bring up sugar levels.  Testing Required after Discharge -   Please have your sugar levels checked with primary care physician and endocrinologist.  Your insulin regimen may require further adjustments.  ** Please contact your PCP to request testing orders for any of the testing recommended here **  Important follow up information -   Follow-up with your primary care physician within 1 week.  We have given you a referral for endocrinology.  Somebody from their office will call to schedule an appointment. If you do not hear back within a week, please call [phone] and follow up.  Other Instructions -   Return to the emergency department if patient is unarousable, altered from baseline, has fevers, chills, difficulty breathing, abnormal vital signs, or any other concerning symptoms.  Please review this entire after visit summary as additional general instructions including medication list, appointments, activity, diet, any pertinent wound care, and other additional recommendations from your care team that may be provided for you.    Sincerely,     Yolanda Kay, DO    Please remove sutures around PEG tube on 9/27/24.

## 2024-09-06 NOTE — PROGRESS NOTES
Adjust tube feed flush order to   70ml water before and after running the feeding, 70ml every 2 hours while feeding is running. 1263 mL total fee water daily with formula and flushes.   Continue Glucerna 1.2 at 80ml/hr x 12 hours. 960mL glucerna, 1152 kcal daily.   Monitor blood glucose during 12 hour down time due to lantus orders.

## 2024-09-06 NOTE — CASE MANAGEMENT
Case Management Progress Note    Patient name Terrie Alicea  Location S /S -01 MRN [id number]  : 1960 Date 2024       LOS (days): 13  Geometric Mean LOS (GMLOS) (days):   Days to GMLOS:        OBJECTIVE:        Current admission status: Inpatient  Preferred Pharmacy:   [facility] [place]  [place], [place] - [address]  [address]  [address]  [place] [address]  Phone: [phone] Fax: [phone]    [facility]  [address]  [place] [address]  Phone: [phone] Fax: [phone]    [facility] - [place], [place] - [address]  [address]  [facility] [address]  Phone: [phone] Fax: [phone]    [facility] - [place], [place] - [address]  [address]  [facility] [address]  Phone: [phone] Fax: [phone]    Primary Care Provider: Gunnar Sylvester DO    Primary Insurance: [employer] [facility]  Secondary Insurance: [place] [employer]    PROGRESS NOTE:    KOBY placed call to pt caregiver Lisa to see when insulin teaching will be completed for her staff. As per Lisa, she sent an email to her regional nurse to determine when teaching can be completed. Lias reported she has not received a response and could not pin point a timeline on when insulin training will be completed. Lisa aware pt is medically stable for d/c as per provider. Lisa reported they are unable to accommodate pt until staff are trained on insulin administration. Lisa inquired about SNF placement, aware pt will need insurance authorization and letter from [facility].    KOBY placed call to [facility] MH/ID program office [phone]), spoke with Breanna Dorado who reported pt has not had an active case since , however is active with [facility]. KOBY was provided contact information for Alondra Dale (MH/ID supervisor) [phone],  Nathalie Morejon ( of [email or web address]) [phone], and Breanna () [phone] ext [phone]. CM placed call to above, no answers, VMs left inquiring who is pt Supports Coordinator, and also to inquire of possible respite group homes pt can d/c to as pt is medically stable for d/c and group home is unable to provide timeline on insulin training of staff. CM also placed call to Atty. Ezra Morgan to provide dcp update and inquire if he could determine who pt Supports Coordinator is. CM awaits return phone calls.    CM sent referrals for SNF placement and sent LOC request to [facility] to review. CM will remain available.

## 2024-09-06 NOTE — PLAN OF CARE
Problem: Nutrition/Hydration-ADULT  Goal: Nutrient/Hydration intake appropriate for improving, restoring or maintaining nutritional needs  Description: Monitor and assess patient's nutrition/hydration status for malnutrition. Collaborate with interdisciplinary team and initiate plan and interventions as ordered.  Monitor patient's weight and dietary intake as ordered or per policy. Utilize nutrition screening tool and intervene as necessary. Determine patient's food preferences and provide high-protein, high-caloric foods as appropriate.     INTERVENTIONS:  - Monitor oral intake, urinary output, labs, and treatment plans  - Assess nutrition and hydration status and recommend course of action  - Evaluate amount of meals eaten  - Assist patient with eating if necessary   - Allow adequate time for meals  - Recommend/ encourage appropriate diets, oral nutritional supplements, and vitamin/mineral supplements  - Order, calculate, and assess calorie counts as needed  - Recommend, monitor, and adjust tube feedings and TPN/PPN based on assessed needs  - Assess need for intravenous fluids  - Provide specific nutrition/hydration education as appropriate  - Include patient/family/caregiver in decisions related to nutrition  Outcome: Progressing     Problem: Prexisting or High Potential for Compromised Skin Integrity  Goal: Skin integrity is maintained or improved  Description: INTERVENTIONS:  - Identify patients at risk for skin breakdown  - Assess and monitor skin integrity  - Assess and monitor nutrition and hydration status  - Monitor labs   - Assess for incontinence   - Turn and reposition patient  - Assist with mobility/ambulation  - Relieve pressure over bony prominences  - Avoid friction and shearing  - Provide appropriate hygiene as needed including keeping skin clean and dry  - Evaluate need for skin moisturizer/barrier cream  - Collaborate with interdisciplinary team   - Patient/family teaching  - Consider wound  care consult   Outcome: Progressing     Problem: Potential for Falls  Goal: Patient will remain free of falls  Description: INTERVENTIONS:  - Educate patient/family on patient safety including physical limitations  - Instruct patient to call for assistance with activity   - Consult OT/PT to assist with strengthening/mobility   - Keep Call bell within reach  - Keep bed low and locked with side rails adjusted as appropriate  - Keep care items and personal belongings within reach  - Initiate and maintain comfort rounds  - Make Fall Risk Sign visible to staff  - Offer Toileting every  Hours, in advance of need  - Initiate/Maintain alarm  - Obtain necessary fall risk management equipment:   - Apply yellow socks and bracelet for high fall risk patients  - Consider moving patient to room near nurses station  Outcome: Progressing     Problem: Potential for Falls  Goal: Patient will remain free of falls  Description: INTERVENTIONS:  - Educate patient/family on patient safety including physical limitations  - Instruct patient to call for assistance with activity   - Consult OT/PT to assist with strengthening/mobility   - Keep Call bell within reach  - Keep bed low and locked with side rails adjusted as appropriate  - Keep care items and personal belongings within reach  - Initiate and maintain comfort rounds  - Make Fall Risk Sign visible to staff  - Offer Toileting every  Hours, in advance of need  - Initiate/Maintain alarm  - Obtain necessary fall risk management equipment:   - Apply yellow socks and bracelet for high fall risk patients  - Consider moving patient to room near nurses station  Outcome: Progressing     Problem: GASTROINTESTINAL - ADULT  Goal: Minimal or absence of nausea and/or vomiting  Description: INTERVENTIONS:  - Administer IV fluids if ordered to ensure adequate hydration  - Maintain NPO status until nausea and vomiting are resolved  - Nasogastric tube if ordered  - Administer ordered antiemetic  medications as needed  - Provide nonpharmacologic comfort measures as appropriate  - Advance diet as tolerated, if ordered  - Consider nutrition services referral to assist patient with adequate nutrition and appropriate food choices  Outcome: Progressing  Goal: Maintains or returns to baseline bowel function  Description: INTERVENTIONS:  - Assess bowel function  - Encourage oral fluids to ensure adequate hydration  - Administer IV fluids if ordered to ensure adequate hydration  - Administer ordered medications as needed  - Encourage mobilization and activity  - Consider nutritional services referral to assist patient with adequate nutrition and appropriate food choices  Outcome: Progressing  Goal: Maintains adequate nutritional intake  Description: INTERVENTIONS:  - Monitor percentage of each meal consumed  - Identify factors contributing to decreased intake, treat as appropriate  - Assist with meals as needed  - Monitor I&O, weight, and lab values if indicated  - Obtain nutrition services referral as needed  Outcome: Progressing  Goal: Oral mucous membranes remain intact  Description: INTERVENTIONS  - Assess oral mucosa and hygiene practices  - Implement preventative oral hygiene regimen  - Implement oral medicated treatments as ordered  - Initiate Nutrition services referral as needed  Outcome: Progressing     Problem: GASTROINTESTINAL - ADULT  Goal: Maintains or returns to baseline bowel function  Description: INTERVENTIONS:  - Assess bowel function  - Encourage oral fluids to ensure adequate hydration  - Administer IV fluids if ordered to ensure adequate hydration  - Administer ordered medications as needed  - Encourage mobilization and activity  - Consider nutritional services referral to assist patient with adequate nutrition and appropriate food choices  Outcome: Progressing     Problem: GASTROINTESTINAL - ADULT  Goal: Maintains adequate nutritional intake  Description: INTERVENTIONS:  - Monitor percentage  of each meal consumed  - Identify factors contributing to decreased intake, treat as appropriate  - Assist with meals as needed  - Monitor I&O, weight, and lab values if indicated  - Obtain nutrition services referral as needed  Outcome: Progressing     Problem: GASTROINTESTINAL - ADULT  Goal: Oral mucous membranes remain intact  Description: INTERVENTIONS  - Assess oral mucosa and hygiene practices  - Implement preventative oral hygiene regimen  - Implement oral medicated treatments as ordered  - Initiate Nutrition services referral as needed  Outcome: Progressing

## 2024-09-06 NOTE — PROGRESS NOTES
"[facility]  Progress Note  Name: Terrie Alicea I  MRN: [id number]  Unit/Bed#: S -01 I Date of Admission: 8/24/2024   Date of Service: 9/6/2024 I Hospital Day: 13    Assessment & Plan   Fever of unknown origin, likely aspiration pneumonia  Assessment & Plan  On 9/1 pt noted to be spiking fevers intermittently. Tmax recorded as 102.4F on 9/3. CXR 9/1 was ordered to r/o aspiration PNA vs atelectasis; showed no acute cardiopulmonary process, although did reveal dilated bowel loops in LUQ. However, pt is passing flatus and having bowel movements. CTAP 8/30 showed postoperative air compatible with open g-tube placement, no evidence of perforation. Repeat skin exam 9/3 without any acutely infected ulcers, disruption of skin integrity, injuries, tenderness. Site of g-tube is healing appropriately. Pt is unable to participate in incentive spirometry for atelectasis prophylaxis. COVID/Flu/RSV and UA negative 9/3. Blood cultures negative. Procalcitonin & lactic acid unremarkable, no leukocytosis. Per report from CT chest, abdomen, pelvis w contrast report 9/3 shows new opacification of \"R lower lobe bronchus, R atelectasis, w sequela of aspiration in R lower lobe. Initiated IV Rocephin 1 g on 9/4. Pt afebrile, no tachycardia, no tachypnea.      Plan:  - Continue IV Rocephin 1 g. Can consider switching to PO Keflex upon discharge.  - Symptomatic relief w IVPB Ofirmev 630 q8h, and ice packs  - VS q4h    * Dysphagia  Assessment & Plan  CT neck no organic obstructions in the mediastinum. Pt has had poor PO intake, malnutrition requiring gastrostomy tube placement on 08/26. Per group home, pt attends day program which cannot manage tube feeds so requesting her to receive it from 6pm to 6am. Settings adjusted to take this into consideration per nutrition.     Plan:  Per Nutrition: Recommend trial cyclic feeds of Glucerna 1.2 @ 80 ml/hr X 12 hrs (Run 6 pm-6am). This provides 960 ml total volume, " 1152 kcals, 58 g protein, and 109 g CHO. If blood sugars remain elevated on cyclic feeds, switch to continuous feeds of Glucerna 1.2 @ 40 ml/hr X 24 hrs to see if lower volume/more time improves blood sugars. Re-consult RD as needed.   Continue Glucerna 1.2 as tolerated per nutrition   60 mL water flush before & after TF cycle  70 mL q2h water flushes  Per SLP: can introduce pleasure feeds or puree & HTL as tolerated    DM (diabetes mellitus) ([facility])  Assessment & Plan  Lab Results   Component Value Date    HGBA1C 6.2 (H) 08/13/2024     Recent Labs     09/05/24 2002 09/06/24  0020 09/06/24  0533 09/06/24  1325   POCGLU 106 176* 180* 52*     Blood Sugar Average: Last 72 hrs:  (P) [id number]    PMHx of diabetes. Pt having hyperglycemic episodes in the setting of tube feeds, possibly related to refeeding syndrome although pt has not had hypokalemia, hypomagnesemia, hypophosphatemia. Required insulin drip on 8/29 as BG>500, discontinued when BG<200. However, pt became hyperglycemic overnight 8/30 in the setting of tube feeds, MAP ~54 w minimal response to fluids and albumin, transferred to ICU. Per review of crit care notes, pt was briefly on Levophed; low suspicion of infectious etiology as pt has not had leukocytosis and has been afebrile, imaging unremarkable. Pt stable, nutrition switched feed from Jevity 1.5 to Glucerna 1.2, transferred back to med/surg on 8/31. Pt noted to be hyperglycemic, could be attributable to underlying infectious process as well. Pt was doing well on current insulin regimen, however today pt noted to have BG 52 in the afternoon and was given D5 bolus.    Plan:  - Decrease Lantus to 18 u qHS d/t hypoglycemia. Will continue to monitor  - Continue insulin sliding scale, algorithm 3  - BG checks q6h  - Repeat [facility]    Hypotension  Assessment & Plan  Pt noted to be hypotensive intermittently throughout hospitalization. Per caregivers and record review, pt's BP tends to run on softer side.  Today RN informed team pt had BP of 82/51 and similar read on repeat; pt was then given 500 cc NS bolus with resolution. Not associated with tachycardia, tachypnea, distress. Physical exam at baseline.    - Midodrine 5 mg TID  - Compression stockings  - VS q4h  - current water flush regimen: 60 mL before & after TF cycle; 70 mL q2h    Mood disturbances  Assessment & Plan  - Continue home meds: Abilify 2 mg, Citalopram 40 mg.  - Pt previously on trazodone 100 mg q HS which was discontinued on 8/30 while in the ICU d/t contributions to sedation.    - Continue Depakene oral solution 125 mg q12h for mood, can defer changes per outpatient psychiatry as deemed necessary.  - Pt has been stable from this standpoint throughout hospitalization, hasn't required restarting of trazodone or escalation in valproic acid thus far.     Parkinson's disease  Assessment & Plan  History of mood disturbances, previously on neuroleptics. Tardive dyskinesia on exam.    Sinemet  qid. Can defer changes to outpatient neurology.     Incontinence  Assessment & Plan  Continue oxybutynin 5 mg TID    UTI (urinary tract infection)  Assessment & Plan  Previous culture 8/20-grew E. coli, pansensitive. CT abdomen pelvis demonstrated wall thickening of urinary bladder. Concurrent aspiration pneumonia. Pt completed course of IV abx at that time. Repeat UA unremarkable 9/3.      Cerebral palsy (HCC)  Assessment & Plan  Continue baclofen 10 mg TID          VTE Pharmacologic Prophylaxis: VTE Score: 7 High Risk (Score >/= 5) - Pharmacological DVT Prophylaxis Ordered: enoxaparin (Lovenox). Sequential Compression Devices Ordered.    Mobility:   Basic Mobility Inpatient Raw Score: 6  JH-HLM Goal: 2: Bed activities/Dependent transfer  JH-HLM Achieved: 1: Laying in bed  JH-HLM Goal achieved. Continue to encourage appropriate mobility.    Patient Centered Rounds: I performed bedside rounds with nursing staff today.  Discussions with Specialists or Other Care  Team Provider:       Education and Discussions with Family / Patient: Updated  (caregivers) at bedside.    Current Length of Stay: 13 day(s)  Current Patient Status: Inpatient   Discharge Plan: Anticipate discharge in 24-48 hrs to group home.     Code Status: Level 1 - Full Code    Subjective:   Pt seen and examined at bedside today, does not appear to be in any acute distress.     Later in the afternoon, RN notified team pt BG 52, pt was given D5 bolus with improvement.    Objective:     Vitals:   Temp (24hrs), Av.8 °F (37.1 °C), Min:97.7 °F (36.5 °C), Max:100.7 °F (38.2 °C)    Temp:  [97.7 °F (36.5 °C)-100.7 °F (38.2 °C)] 98.5 °F (36.9 °C)  HR:  [55-79] 55  Resp:  [14-16] 16  BP: ()/(40-71) 105/71  SpO2:  [92 %-96 %] 94 %  Body mass index is 21.01 kg/m².     Input and Output Summary (last 24 hours):     Intake/Output Summary (Last 24 hours) at 2024 1421  Last data filed at 2024 0600  Gross per 24 hour   Intake 1380 ml   Output 395 ml   Net 985 ml       Physical Exam:   Physical Exam  Vitals reviewed.   Constitutional:       General: She is awake. She is not in acute distress.     Interventions: Nasal cannula in place.   HENT:      Right Ear: External ear normal.      Left Ear: External ear normal.      Mouth/Throat:      Comments: Lip smacking (chronic)  Cardiovascular:      Rate and Rhythm: Normal rate and regular rhythm.   Pulmonary:      Effort: No tachypnea or respiratory distress.      Breath sounds: No stridor.   Abdominal:      General: Bowel sounds are normal.      Tenderness: There is no abdominal tenderness. There is no guarding.      Comments: G-tube in place   Musculoskeletal:         General: Deformity present. No swelling or tenderness.   Skin:     General: Skin is warm.      Capillary Refill: Capillary refill takes less than 2 seconds.      Coloration: Skin is not jaundiced.      Findings: No erythema or rash.   Neurological:      Mental Status: Mental status is at  baseline.         Additional Data:     Labs:  Results from last 7 days   Lab Units 09/06/24  0543 09/05/24  0440   WBC Thousand/uL 6.26 5.61   HEMOGLOBIN g/dL 9.2* 8.7*   HEMATOCRIT % 28.6* 27.5*   PLATELETS Thousands/uL 206 186   SEGS PCT %  --  41*   LYMPHO PCT %  --  38   MONO PCT %  --  9   EOS PCT %  --  11*     Results from last 7 days   Lab Units 09/06/24  0543 09/05/24  0440   SODIUM mmol/L 138 139   POTASSIUM mmol/L 4.7 4.7   CHLORIDE mmol/L 99 101   CO2 mmol/L 32 35*   BUN mg/dL 28* 24   CREATININE mg/dL 0.88 0.88   ANION GAP mmol/L 7 3*   CALCIUM mg/dL 10.1 9.4   ALBUMIN g/dL  --  3.1*   TOTAL BILIRUBIN mg/dL  --  0.18*   ALK PHOS U/L  --  43   ALT U/L  --  8   AST U/L  --  18   GLUCOSE RANDOM mg/dL 175* 127         Results from last 7 days   Lab Units 09/06/24  1416 09/06/24  1325 09/06/24  0533 09/06/24  0020 09/05/24  2002 09/05/24  1525 09/05/24  1252 09/05/24  0701 09/05/24  0557 09/04/24  2353 09/04/24  1815 09/04/24  1155   POC GLUCOSE mg/dl 145* 52* 180* 176* 106 65 98 163* 170* 195* 89 121         Results from last 7 days   Lab Units 09/05/24  0440 09/03/24  0323 09/02/24  0456 09/01/24  0349   LACTIC ACID mmol/L 1.0 1.8  --  1.1   PROCALCITONIN ng/ml 0.09  --  0.12  --        Lines/Drains:  Invasive Devices       Peripheral Intravenous Line  Duration             Peripheral IV 09/03/24 Distal;Left;Ventral (anterior) Forearm 3 days              Drain  Duration             Gastrostomy/Enterostomy  16 Fr. LUQ 10 days                      Imaging: Reviewed radiology reports from this admission including: chest xray and abdominal/pelvic CT    Recent Cultures (last 7 days):   Results from last 7 days   Lab Units 09/03/24  0324   BLOOD CULTURE  No Growth at 72 hrs.  No Growth at 72 hrs.       Last 24 Hours Medication List:   Current Facility-Administered Medications   Medication Dose Route Frequency Provider Last Rate    acetaminophen  15 mg/kg Intravenous Q8H PRN [place]  mg (09/03/24  0038)    ammonium lactate   Topical BID PRN Lisa Carty MD      ARIPiprazole  2 mg Per G Tube HS Lisa Carty MD      artificial tear   Both Eyes HS PRN Lisa Carty MD      vitamin C  1,000 mg Per G Tube Daily Lisa Carty MD      baclofen  10 mg Per G Tube TID Lisa Carty MD      calcium carbonate  750 mg Oral BID Lisa Carty MD      carbamide peroxide  2 drop Both Ears BID PRN Lisa Carty MD      carbidopa-levodopa  1 tablet Oral 4x Daily Lisa Carty MD      cefTRIAXone  1,000 mg Intravenous Q24H Karen Moreno MD 1,000 mg (09/06/24 0928)    cholecalciferol  1,000 Units Per G Tube Daily Lisa Carty MD      citalopram  40 mg Per G Tube Daily Lisa Carty MD      dextromethorphan-guaiFENesin  10 mL Per G Tube Q4H PRN Lisa Carty MD      docosanol   Topical 5x Daily Lisa Carty MD      enoxaparin  40 mg Subcutaneous Daily Lisa Carty MD      Ferrous Sulfate  300 mg Oral BID AC Lisa Carty MD      fluticasone  1 spray Nasal Daily PRN Lisa Carty MD      hydrocortisone   Topical 4x Daily PRN Lisa Carty MD      insulin glargine  18 Units Subcutaneous HS Silvana Mike DO      insulin lispro  1-6 Units Subcutaneous Q6H [facility] Lisa Carty MD      Ketotifen Fumarate  1 drop Both Eyes Daily PRN Lisa Carty MD      levothyroxine  25 mcg Per G Tube QAM Lisa Carty MD      Loratadine  10 mg Oral Daily PRN Lisa Carty MD      LORazepam  0.5 mg Per G Tube HS Sonya Young MD      methenamine hippurate  1 g Oral BID Lisa Carty MD      midodrine  5 mg Oral TID AC Silvana Mike DO      omeprazole (PRILOSEC) suspension 2 mg/mL  20 mg Oral Daily Lisa Carty MD      oxybutynin  5 mg Per G Tube TID Lisa Carty MD      polyethylene glycol  17 g Oral Daily Lisa Carty MD      pravastatin  40 mg Per G Tube Daily With Dinner Lisa Carty MD      valproic acid  125 mg Oral Q12H [facility] Silvana Mike DO          Today, Patient Was Seen By: Silvana Mike DO  PGY-1    **Please Note: This note may have been constructed using a voice recognition  system.**

## 2024-09-06 NOTE — OCCUPATIONAL THERAPY NOTE
Occupational Therapy Evaluation     Patient Name: Terrie Alicea  Today's Date: 9/6/2024  Problem List  Principal Problem:    Dysphagia  Active Problems:    Cerebral palsy (HCC)    UTI (urinary tract infection)    DM (diabetes mellitus) (HCC)    Hypotension    Incontinence    Parkinson's disease    Mood disturbances    Fever of unknown origin, likely aspiration pneumonia    Past Medical History  Past Medical History:   Diagnosis Date    Acute metabolic encephalopathy 12/11/2015    Adjustment disorder     Altered mental status 12/11/2015    Anemia     Bipolar 1 disorder (HCC)     Cerebral palsy (HCC)     Chronic hypernatremia 02/06/2016    Closed fracture of left hip (HCC) 01/19/2016    Closed left hip fracture (HCC)     no surgery    Constipation     Dehydration 02/20/2016    Developmental delay     Diabetes mellitus (HCC)     Difficulty walking     Disease of thyroid gland     Diverticulosis     Dysphagia     Worsening    Fracture of multiple ribs of right side     Herpes zoster without complication 06/02/2021    Hip fracture ([facility]) 07/26/2015    left    Hyperlipidemia     Hypernatremia 12/28/2018    Hypotension     Impulse control disorder     Incontinence     Intellectual disability due to developmental disorder, unspecified     Microalbuminuria     Neuropathy in diabetes ([facility])     Osteopathia     Osteoporosis     Peripheral neuropathy     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HCC)     Thrombocytopenia ([facility]) 07/31/2015     Past Surgical History  Past Surgical History:   Procedure Laterality Date    ABDOMINAL SURGERY      COLECTOMY MIN      re-anastomosis 7/22/16    COLON SURGERY  1/31/16    COLONOSCOPY N/A 07/21/2016    Procedure: COLONOSCOPY;  Surgeon: Rosalino Jacome MD;  [facility]: [facility];  Service:     COLONOSCOPY N/A 01/31/2016    Procedure: COLONOSCOPY;  Surgeon: Rosalino Jacome MD;  [facility];  Service:     COLONOSCOPY N/A 07/25/2017    Procedure: COLONOSCOPY;  Surgeon: Rosalino Jacome MD;   [facility]: [facility];  Service: Colorectal    COLOSTOMY      EXPLORATORY LAPAROTOMY W/ BOWEL RESECTION N/A 01/31/2016    Procedure: exploratory laparotomy, left sigmoidectomy, coloproctostomy, take down splenic flexure, loop colostomy;  Surgeon: Rosalino Jacome MD;  [facility];  Service:     GASTROSTOMY TUBE PLACEMENT N/A 8/26/2024    Procedure: INSERTION GASTROSTOMY TUBE OPEN;  Surgeon: Monroe Jean DO;  [facility];  Service: General    MI CLOSURE ENTEROSTOMY LG/SMALL INTESTINE N/A 07/22/2016    Procedure: SEGMENTAL COLECTOMY WITH COLOCOLOSTOMY;  Surgeon: Rosalino Jacome MD;  [facility];  Service: Colorectal    UPPER GASTROINTESTINAL ENDOSCOPY               09/06/24 0847   OT Last Visit   OT Visit Date 09/06/24   Note Type   Note type Evaluation +treat   Pain Assessment   Pain Assessment Tool FLACC   Pain Rating: FLACC (Rest) - Face 0   Pain Rating: FLACC (Rest) - Legs 0   Pain Rating: FLACC (Rest) - Activity 0   Pain Rating: FLACC (Rest) - Cry 0   Pain Rating: FLACC (Rest) - Consolability 0   Score: FLACC (Rest) 0   Pain Rating: FLACC (Activity) - Face 1   Pain Rating: FLACC (Activity) - Legs 2   Pain Rating: FLACC (Activity) - Activity 2   Pain Rating: FLACC (Activity) - Cry 0   Pain Rating: FLACC (Activity) - Consolability 0   Score: FLACC (Activity) 5   Restrictions/Precautions   Weight Bearing Precautions Per Order No   Other Precautions Bed Alarm;Chair Alarm;Cognitive;Fall Risk   Home Living   Type of Home Group Home   Home Equipment   (Per EMR:Assisted Devices (DME) used: Wheelchair, Gerardo lift (Gerardo Lift at day program and stand pivot with assistance at home))   Prior Function   Level of [place] Total dependent  (per notes Pt is dep for all ADLs)   Lives With Facility staff   IADLs Family/Friend/Other provides transportation;Family/Friend/Other provides medication management;Family/Friend/Other provides meals   Vocational On disability   Comments Above info  obtained from chart, patient is nonverbal   Lifestyle   Autonomy Per chart patient resides in a group home, is dependent for ADLs, utilized wheelchair, Gerardo lift vs stand pivot transfers   Service to Others On disability   Intrinsic Gratification Likes holding her iva bear   General   Family/Caregiver Present No   ADL   Eating Assistance Other (Comment)  (PEG)   Grooming Assistance 1  Total Assistance   Grooming Deficit Wash/dry hands;Wash/dry face;Brushing hair   UB Bathing Assistance 1  Total Assistance   LB Bathing Assistance 1  Total Assistance   UB Dressing Assistance 2  Maximal Assistance   UB Dressing Deficit   (gown management)   LB Dressing Assistance 1  Total Assistance   LB Dressing Deficit Don/doff R sock;Don/doff L sock   Toileting Assistance  1  Total Assistance   Toileting Deficit Perineal hygiene   Bed Mobility   Rolling R 1  Dependent   Additional items Assist x 1   Rolling L 1  Dependent   Additional items Assist x 1   Supine to Sit 1  Dependent   Additional items Assist x 1;Bedrails;Increased time required   Transfers   Sit pivot 1  Dependent   Additional items Assist x 1  (After 2 attempts, Pt with UNSuccessful on trials to Perform pivot to L side due to consistent L knee flexion/hypertonicity placing L foot under bed, limiting WB onto L foot.)   RUE Assessment   RUE Assessment X   RUE Tone   RUE Tone Hypertonic  (RUE in flexor synergy, limited PROM @ shoulder grossly throughout, elbow extension.)   LUE Assessment   LUE Assessment   (slightly more active/PROM noted compared to RUE, unable to follow commands for testing)   Cognition   Overall Cognitive Status Impaired   Arousal/Participation Arousable   Orientation Level Unable to assess   Memory Unable to assess   Following Commands Unable to follow one step commands   Assessment   Limitation Decreased ADL status;Decreased UE ROM;Decreased UE strength;Decreased cognition;Decreased endurance;Decreased self-care trans   Assessment Pt is a 64  y.o. female seen for OT evaluation s/p admission to [facility] on 8/24/2024 due to fever. Diagnosed with Dysphagia. PMH: mental retardation, functional quadriplegia, dysphagia and cerebral palsy, bipolar , UTI. Personal and env factors supporting pt at time of IE include supportive staff, FFSU, no DANII, and (A) with all ADLs. Pt was living in a group home in requiring full assistance for self-help skills, dependent IADLs, wheelchair level mobility, Gerardo lift vs stand pivot transfer with assistance.  Patient presents today with decreased core strength, hypertonia, functional cognitive deficits, and positive dysphagia (PEG) Decreased range of motion, Decreased endurance, Impaired balance, Decreased mobility, Decreased coordination, Decreased safety awareness, Impaired judgement. nonverbal.   Despite pt's current functional limitations and medical complications pt is functioning at baseline for self help skills. No further acute OT needs identified at this time. Recommend continued active ADL participation  with hospital staff while in the hospital to increase pt’s endurance and strength upon D/C. From OT standpoint, recommend D/C to group home if facility is able to provide needed level of care. D/C pt from OT caseload at this time.   Goals   Patient Goals none stated, pt non verbal. However pt not resistant to participation   Plan   OT Treatment Day 0   OT Frequency Eval only   Discharge Recommendation   Rehab Resource Intensity Level, OT No post-acute rehabilitation needs   AM-PAC Daily Activity Inpatient   Lower Body Dressing 1   Bathing 1   Toileting 1   Upper Body Dressing 2   Grooming 1   Eating 1   Daily Activity Raw Score 7   Additional Treatment Session   Start Time 0909   End Time 0927   Treatment Assessment Patient seen today for OT follow-up session with emphasis on functional transfers, core strengthening, education.  Patient was residing in a group home per chart was dependent for all  I/ADLs, wheelchair level mobility and Gerardo lift vs stand pivot transfer with assistance.  Patient presents today with decreased core strength, hypertonia, functional cognitive deficits, and positive dysphagia (PEG)  Decreased range of motion, Decreased endurance, Impaired balance, Decreased mobility, Decreased coordination, Decreased safety awareness, Impaired judgement.  Patient required full assist of 1 to complete supine to sit and is dependent for bed to recliner transfer.  Transfer completed to patient's right side due to left lower extremity hypertonia and anatomical deformities on left side.  Nursing educated on positioning for increased independence and safety during transfers.  Patient is dependent for grooming tasks seated at end of bed, mod assist for gown management, and dependent to don socks.  Patient requires assistance to sit end of bed due to core weakness.  Patient is at baseline level of function help skills, will sign off for OT at this time.  No further OT recommended after discharge.   End of Consult   Education Provided Yes   Patient Position at End of Consult Bedside chair;Bed/Chair alarm activated;All needs within reach   Nurse Communication Nurse aware of consult   End of Consult Comments This session, pt required and most appropriately benefited from skilled OT/PT co-treatment due to extensive physical assistance of SKILLED therapists, significant regression from functional baseline, and decreased activity tolerance. OT and PT goals were addressed separately as seen in documentation.

## 2024-09-06 NOTE — PLAN OF CARE
"  Problem: PHYSICAL THERAPY ADULT  Goal: Performs mobility at highest level of function for planned discharge setting.  See evaluation for individualized goals.  Description: Treatment/Interventions: Functional transfer training, Patient/family training, Equipment eval/education, Bed mobility, Spoke to nursing, Spoke to case management, OT  Equipment Recommended: Wheelchair (anticipate pt likely has specialty WC, but no further information available.)       See flowsheet documentation for full assessment, interventions and recommendations.  Note: Prognosis: Guarded  Problem List: Decreased strength, Decreased range of motion, Decreased endurance, Impaired balance, Decreased mobility, Decreased coordination, Decreased safety awareness, Impaired judgement, Impaired tone (limited command following, non verbal)  Assessment: Pt is a 64 y.o. female seen for PT evaluation s/p admit to [facility] on 8/24/2024 w/ Dysphagia. However group home requested PT consult prior to DC.      Prior to admission per EMR (pt is nonverbal): Pt lived at group home and was DEPENDENT FOR ADLs  AND TRANSFERS.  Pt had WC and adela lift  which was used at day program, but staff performed \"stand pivot with assistance\" at home. Upon evaluation: Pt was dependent for bed mobility, and was unsuccessful with performing transfers toward L side target surface.      Pt's clinical presentation is currently unstable/unpredictable given the functional mobility deficits above, especially (but not limited to) decreased ROM and impaired tone, and combined with medical complications including abnormal H&H and abnormal CO2 values.  Pt IS CLOSE to her mobility baseline.  She is at risk for falls based on impaired balance, impaired coordination, and hypertonicity .       During this admission, pt would benefit from 1-2 continued skilled inpatient PT in the acute care setting in order to address deficits as defined above to maximize function and mobility.   "    Recommendations:     From a PT standpoint, recommend next several sessions focus on attempts on successful transfers, manual techniques, and care coordination for staff education.  Barriers to Discharge: Decreased caregiver support     Rehab Resource Intensity Level, PT: III (Minimum Resource Intensity) (return to facility)    See flowsheet documentation for full assessment.

## 2024-09-06 NOTE — PHYSICAL THERAPY NOTE
PHYSICAL THERAPY EVALUATION  NAME:  Terrie Alicea  DATE: 09/06/24    AGE:   64 y.o.  Mrn:   [id number]  Principal problem: Principal Problem:    Dysphagia  Active Problems:    Cerebral palsy (HCC)    UTI (urinary tract infection)    DM (diabetes mellitus) (HCC)    Hypotension    Incontinence    Parkinson's disease    Mood disturbances    Fever of unknown origin, likely aspiration pneumonia      Vitals:    09/06/24 0600 09/06/24 0757 09/06/24 0859 09/06/24 0903   BP:  102/54 97/53 105/71   BP Location:  Right arm     Pulse:  62     Resp:  16     Temp:  99.5 °F (37.5 °C)     TempSrc:  Axillary     SpO2:  96%     Weight: 45.6 kg (100 lb 8.5 oz)          Length Of Stay: 13  Performed at least 2 patient identifiers during session: Name and Birthday  PHYSICAL THERAPY EVALUATION :    09/06/24 0846   PT Last Visit   PT Visit Date 09/06/24   Note Type   Note type Evaluation   Pain Assessment   Pain Assessment Tool FLACC   Pain Rating: FLACC (Rest) - Face 0   Pain Rating: FLACC (Rest) - Legs 0   Pain Rating: FLACC (Rest) - Activity 0   Pain Rating: FLACC (Rest) - Cry 0   Pain Rating: FLACC (Rest) - Consolability 0   Score: FLACC (Rest) 0   Pain Rating: FLACC (Activity) - Face 1   Pain Rating: FLACC (Activity) - Legs 2   Pain Rating: FLACC (Activity) - Activity 2   Pain Rating: FLACC (Activity) - Cry 0   Pain Rating: FLACC (Activity) - Consolability 0   Score: FLACC (Activity) 5   Restrictions/Precautions   Weight Bearing Precautions Per Order No   Other Precautions Bed Alarm;Chair Alarm;Cognitive;Fall Risk   Home Living   Type of Home Group Home   Home Equipment   (Per EMR:Assisted Devices (DME) used: Wheelchair, Gerardo lift (Gerardo Lift at day program and stand pivot with assistance at home))   Prior Function   Level of [place] Total dependent  (D for ADL's, uncertain if pt Performs any active part of functional mobility on command)   Lives With Facility staff   Vocational On disability   General   Additional Pertinent  History 8/26: G tube; stay complicated by tachycardia, elevated BS   Family/Caregiver Present No   Cognition   Overall Cognitive Status Impaired   Arousal/Participation   (initially lethargic, but more alert w/ activity. Nursing staff reports pt typically wakes closer to 10 am)   Orientation Level Unable to assess   Memory Unable to assess   Following Commands Unable to follow one step commands   Subjective   Subjective Pt non verbal, displays tartive dyskinesia-like movement @ face. Intermittent tracking   RUE Assessment   RUE Assessment X   RUE Tone   RUE Tone Hypertonic  (RUE in flexor synergy, limited PROM @ shoulder grossly throughout, elbow extension.)   LUE Assessment   LUE Assessment   (More active/PROM noted compared to RUE, but does not follow commands for testing)   RLE Assessment   RLE Assessment   (More active/PROM noted compared to LLE, but does not follow commands for testing)   RLE Overall PROM   R Knee Flexion WFL   R Knee Extension unable to achieve neutral   R Ankle Dorsiflexion unable to achieve neutral DF, but able to achieve neutral inv/EV   LLE Overall PROM   L Knee Flexion WFL   L Knee Extension limited from neutral   L Ankle Dorsiflexion unable to achieve neutral DF, and ankle fixed in inversion position.   Tone LLE   LLE Tone (S)  Hypertonic  (flexion synergy, especially with attempts @ transfers)   Coordination   Movements are Fluid and Coordinated 0   Coordination and Movement Description No rigid at trunk,  little to no AROM throughout. No protective reactions noted, and provided <25% A to correct unsupported LOB @ EOB.   Light Touch   RLE Light Touch Not tested   LLE Light Touch Not tested   Bed Mobility   Rolling R 1  Dependent  (not resistive)   Additional items Assist x 1   Rolling L 1  Dependent  (not resistive)   Additional items Assist x 1   Supine to Sit 1  Dependent   Additional items Assist x 1;Bedrails;Increased time required  (L egress; 2 trials)   Sit to Supine Unable to  "assess   Additional Comments Pt incontinent of urine prior to transfer, A above for rolling and cleaning prior to OOB   Transfers   Sit to Stand (S)  Unable to assess  (due to pt's limited ability to fully extend @ knees, hips and trunk)   Additional items Assist x 1  (After 2 attempts, Pt with UNSuccessful on trials to Perform pivot to L side due to consistent L knee flexion/hypertonicity placing L foot under bed, limiting WB onto L foot.)   Ambulation/Elevation   Gait pattern Not appropriate  (Pt non amb prior to this admission)   Balance   Static Sitting Zero  (Between poor- and zero: Pt does not PUSH into extension, flexion, SB, but only self corrects her balance < 25% of the time.)   Static Standing Zero   Endurance Deficit   Endurance Deficit Yes   Endurance Deficit Description initially lethargic but more alert at end of session   Activity Tolerance   Activity Tolerance Patient limited by fatigue;Other (Comment)  (hypertonicity)   Medical Staff Made Aware care coordination w/ Cathie from case management and Annabelle from OT   Nurse Made Aware spoke to RN at length prior to session, who reports that does not believe pt has been OOB here.     Assessment:   Pt is a 64 y.o. female seen for PT evaluation s/p admit to [facility] on 8/24/2024 w/ Dysphagia. However group home requested PT consult prior to DC.      Prior to admission per EMR (pt is nonverbal): Pt lived at group home and was DEPENDENT FOR ADLs  AND TRANSFERS.  Pt had WC and adela lift  which was used at day program, but staff performed \"stand pivot with assistance\" at home. Upon evaluation: Pt was dependent for bed mobility, and was unsuccessful with performing transfers toward L side target surface.      Pt's clinical presentation is currently unstable/unpredictable given the functional mobility deficits above, especially (but not limited to) decreased ROM and impaired tone, and combined with medical complications including abnormal H&H and " abnormal CO2 values.  Pt IS CLOSE to her mobility baseline.  She is at risk for falls based on impaired balance, impaired coordination, and hypertonicity.       During this admission, pt would benefit from 1-2 continued skilled inpatient PT in the acute care setting in order to address deficits as defined above to maximize function and mobility.      Recommendations:    From a PT standpoint, recommend next several sessions focus on attempts on successful transfers, manual techniques, and care coordination for staff education.     Prognosis Guarded   Problem List Decreased strength;Decreased range of motion;Decreased endurance;Impaired balance;Decreased mobility;Decreased coordination;Decreased safety awareness;Impaired judgement;Impaired tone  (limited command following, non verbal)   Barriers to Discharge Decreased caregiver support   Goals   Patient Goals none stated, pt non verbal. However pt not resistent to participation   STG Expiration Date 09/07/24   Short Term Goal #1 Goals: Pt will: Perform successful transfers with  A of 1-2  to decrease burden of care and decrease risk for falls. Staff will verbalize understanding of recommendations for transfers and positioning in chair to promote consistency with transfers and decrease risk for falls.   PT Treatment Day 1   Plan   Treatment/Interventions Functional transfer training;Patient/family training;Equipment eval/education;Bed mobility;Spoke to nursing;Spoke to case management;OT   PT Frequency Other (Comment)  (1-2 sessions then DC from IPPT)   Discharge Recommendation   Rehab Resource Intensity Level, PT III (Minimum Resource Intensity)  (return to facility)   Equipment Recommended Wheelchair  (anticipate pt likely has specialty WC, but no further information available.)   Wheelchair Package Recommended   (hemiheight WC due to pt's short stature, ? reclining w/ pt's limited balance self correction. Recommend removable (ie foldback) armrests for ease of SIT  PIVOT transfer.)   Additional Comments 2 Personal factors affecting pt at time of IE include: limited home support, advanced age, past experience, behavioral pattern, inability to perform IADLs, inability to perform ADLs, inability to ambulate household distances, inability to navigate community distances, and limited insight into impairments.   AM-PAC Basic Mobility Inpatient   Turning in Flat Bed Without Bedrails 1   Lying on Back to Sitting on Edge of Flat Bed Without Bedrails 1   Moving Bed to Chair 1   Standing Up From Chair Using Arms 1   Walk in Room 1   Climb 3-5 Stairs With Railing 1   Basic Mobility Inpatient Raw Score 6   Turning Head Towards Sound 2   Follow Simple Instructions 1   Low Function Basic Mobility Raw Score  9   Low Function Basic Mobility Standardized Score  12.55   [facility] Highest Level Of Mobility   -[facility] Goal 2: Bed activities/Dependent transfer   -[facility] Achieved 4: Move to chair/commode   Additional Treatment Session   Start Time 0915   End Time 0930   Treatment Assessment Pt was successful in getting to WC toward R side using strategies. Additionally RN and PCA verbalized understanding of purpose of recommendations for pt transfers while here and stated that they would reach out for A if needed to pt's return back to bed.  Ideally staff may benefit from 1 more session for training, and signage over bed for consistency of mobility.   Additional Treatment Day 1   Exercises   Neuro re-ed Sit pivot trials: Successful trials to R side pivot to target surface: dependent A of 1 using pad as sling, with second person A to limit L knee flexion synergy/hypertonicity and to keep part of L foot on floor. Additional time for pt positioning in recliner for  promoting hip flexion w/o trunk SB, knee flexion and foot placement with stool under feet.  RN and PCA present for care coordination of skilled recommendations and strategies, including to transfer pt to R side if able, and limit sitting  "tolerance in chair w/ cushion to 2 hrs as she may be limited w/ repositioning in chair   End of Consult   Patient Position at End of Consult All needs within reach;Bed/Chair alarm activated;Bedside chair   Pt requires PT /OT co-treat and co-eval due to required skilled interventions of at least 2 clinicians for care delivery, medical complexity, limited activity tolerance, and cognitive-behavioral impairments.   PT and OT goals addressed separately.   (Please find full objective findings from PT assessment regarding body systems outlined above).     The patient's -[facility] Basic Mobility Inpatient Short Form Raw Score is 6. A Raw score of less than or equal to 16 suggests the patient may benefit from discharge to post-acute rehabilitation services, which DOES NOT coincide with CURRENT above PT recommendations.     However please refer to therapist recommendation for discharge planning given other factors that may influence destination.     Adapted from Rodney FREED, Brett J, Kofi J, [place]. [facility] “6-Clicks” Basic Mobility and Daily Activity Scores With Discharge Destination. Physical Therapy, 2021;101:1-9. DOI: [email or web address]    Portions of the record may have been created with voice recognition software.  Occasional wrong word or \"sound a like\" substitutions may have occurred due to the inherent limitations of voice recognition software.  Read the chart carefully and recognize, using context, where substitutions have occurred    "

## 2024-09-06 NOTE — CASE MANAGEMENT
Case Management Discharge Planning Note    Patient name Terrie Alicea  Location S /S -01 MRN [id number]  : 1960 Date 2024       Current Admission Date: 2024  Current Admission Diagnosis:Dysphagia   Patient Active Problem List    Diagnosis Date Noted Date Diagnosed    Fever of unknown origin, likely aspiration pneumonia 2024     Mood disturbances 2024     Severe protein-calorie malnutrition (HCC) 2024     Functional quadriplegia (HCC) 2024     Protein-calorie malnutrition, unspecified severity (HCC) 2024     Hospital discharge follow-up 2024     SIRS (systemic inflammatory response syndrome) (Formerly [facility]) 2024     Developmental delay      Preop examination 2023     Cataract 2023     Internal hemorrhoids 2023     Contracture of right hand 2023     Need for shingles vaccine 2023     Viral illness 2022     Dystonic cerebral palsy (HCC) 2022     Cervical dystonia 2022     Parkinson's disease 2022     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HCC) 10/18/2021     Polyneuropathy associated with underlying disease (Formerly [facility]) 10/18/2021     Hypercalcemia 2021     Herpes zoster without complication 2021     Abnormal finding on lung imaging 2021     Anemia 2020     Eosinophilic leukocytosis 2020     Underweight 2020     Dysphagia 2020     History of vitamin D deficiency 2020     History of fall 10/28/2019     Breast asymmetry 2019     Hypernatremia 2018     Severe sepsis (HCC) 2018     Gait disturbance 2018     Osteoarthritis of both hips 2018     Severe Intellectual disability 2018     DM (diabetes mellitus) (HCC) 2018     Abnormal albumin 2017     Peripheral neuropathy 2017     Seasonal allergies 2017     Physical deconditioning 2017     Hyperlipidemia 2017     Gastroesophageal reflux disease 2017      Cerebral palsy (HCC) 03/27/2017     Spasticity 03/27/2017     Ambulatory dysfunction 03/27/2017     UTI (urinary tract infection) 03/27/2017     Bipolar disorder (HCC) 03/27/2017     Hypotension 04/11/2016     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Formerly [facility]) 02/01/2016     Weight loss 01/21/2016     Osteoporosis 01/19/2016     Resting tremor 01/19/2016     Acute metabolic encephalopathy 12/11/2015     Gait abnormality 12/11/2015     Platelets decreased ([facility]ly [facility]) 07/31/2015     Other chronic pain 07/30/2015     Postmenopausal vaginal bleeding 07/23/2015     Anxiety 07/15/2015     Menopause 09/25/2014     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 [address]  Phone: [phone] Fax: [phone]    [facility]  [address]  [place] [address]  Phone: [phone] Fax: [phone]    [facility] [place] - [address]  [address] [facility] [address]  Phone: [phone] Fax: [phone]    [facility] [place], [place] - [address]  [address]  [facility] [address]  Phone: [phone] Fax: [phone]    Primary Care Provider: Gunnar Sylvester DO    Primary Insurance: [employer] [facility] [employer]  Secondary Insurance: [place] MEDICAL [employer]    DISCHARGE DETAILS:    Other Referral/Resources/Interventions Provided:  Interventions: Other (Specify), Prescription Price Check  Referral Comments: CM placed call to [facility] pharmacy [phone] to  obtain price of insulin supplies. KOBY spoke with Dolores from billing department who reported alcohol swabs are covered 100%, glargine, covered 100%, and pens are covered 100% by pt prescription plan. Dolores reported test strips are NOT covered by pt insurance plan, OOP cost $79.78. Dolores reported pharmacy does NOT keep lancets in stock and will need to order them. Dolores reported if they use their direct supplier, it will take 24hrs to deliver. Dolores reported if direct supplier does not have it in stock, it can take up to a week for delivery. Dolores reported she spoke with her supplier department and was told the One Touch Frida Reflect Device Kit will no longer be manufactured at the end of the month, and could NOT provide CM a price for device and are requesting if pt can be switched to a different device. CM notified provider of above. CM will remain available.

## 2024-09-07 LAB
ANION GAP SERPL CALCULATED.3IONS-SCNC: 4 MMOL/L (ref 4–13)
BUN SERPL-MCNC: 27 MG/DL (ref 5–25)
CALCIUM SERPL-MCNC: 10.4 MG/DL (ref 8.4–10.2)
CHLORIDE SERPL-SCNC: 100 MMOL/L (ref 96–108)
CO2 SERPL-SCNC: 36 MMOL/L (ref 21–32)
CREAT SERPL-MCNC: 0.97 MG/DL (ref 0.6–1.3)
ERYTHROCYTE [DISTWIDTH] IN BLOOD BY AUTOMATED COUNT: 13.9 % (ref 11.6–15.1)
GFR SERPL CREATININE-BSD FRML MDRD: 62 ML/MIN/1.73SQ M
GLUCOSE SERPL-MCNC: 133 MG/DL (ref 65–140)
GLUCOSE SERPL-MCNC: 138 MG/DL (ref 65–140)
GLUCOSE SERPL-MCNC: 149 MG/DL (ref 65–140)
GLUCOSE SERPL-MCNC: 55 MG/DL (ref 65–140)
GLUCOSE SERPL-MCNC: 60 MG/DL (ref 65–140)
GLUCOSE SERPL-MCNC: 90 MG/DL (ref 65–140)
HCT VFR BLD AUTO: 29.5 % (ref 34.8–46.1)
HGB BLD-MCNC: 9.3 G/DL (ref 11.5–15.4)
MCH RBC QN AUTO: 32.4 PG (ref 26.8–34.3)
MCHC RBC AUTO-ENTMCNC: 31.5 G/DL (ref 31.4–37.4)
MCV RBC AUTO: 103 FL (ref 82–98)
PLATELET # BLD AUTO: 200 THOUSANDS/UL (ref 149–390)
PMV BLD AUTO: 12.4 FL (ref 8.9–12.7)
POTASSIUM SERPL-SCNC: 5.1 MMOL/L (ref 3.5–5.3)
RBC # BLD AUTO: 2.87 MILLION/UL (ref 3.81–5.12)
SODIUM SERPL-SCNC: 140 MMOL/L (ref 135–147)
WBC # BLD AUTO: 5.23 THOUSAND/UL (ref 4.31–10.16)

## 2024-09-07 PROCEDURE — 99232 SBSQ HOSP IP/OBS MODERATE 35: CPT | Performed by: INTERNAL MEDICINE

## 2024-09-07 PROCEDURE — 82948 REAGENT STRIP/BLOOD GLUCOSE: CPT

## 2024-09-07 PROCEDURE — 85027 COMPLETE CBC AUTOMATED: CPT

## 2024-09-07 PROCEDURE — 80048 BASIC METABOLIC PNL TOTAL CA: CPT

## 2024-09-07 RX ORDER — DEXTROSE MONOHYDRATE 25 G/50ML
INJECTION, SOLUTION INTRAVENOUS
Status: COMPLETED
Start: 2024-09-07 | End: 2024-09-07

## 2024-09-07 RX ORDER — DEXTROSE MONOHYDRATE 25 G/50ML
INJECTION, SOLUTION INTRAVENOUS
Status: COMPLETED
Start: 2024-09-07 | End: 2024-09-08

## 2024-09-07 RX ORDER — INSULIN GLARGINE 100 [IU]/ML
15 INJECTION, SOLUTION SUBCUTANEOUS
Status: DISCONTINUED | OUTPATIENT
Start: 2024-09-07 | End: 2024-09-08

## 2024-09-07 RX ADMIN — ARIPIPRAZOLE 2 MG: 2 TABLET ORAL at 22:00

## 2024-09-07 RX ADMIN — METHENAMINE HIPPURATE 1 G: 1 TABLET ORAL at 09:26

## 2024-09-07 RX ADMIN — CEFTRIAXONE SODIUM 1000 MG: 10 INJECTION, POWDER, FOR SOLUTION INTRAVENOUS at 09:34

## 2024-09-07 RX ADMIN — PRAVASTATIN SODIUM 40 MG: 40 TABLET ORAL at 16:49

## 2024-09-07 RX ADMIN — DOCOSANOL: 100 CREAM TOPICAL at 13:27

## 2024-09-07 RX ADMIN — CALCIUM CARBONATE (ANTACID) CHEW TAB 500 MG 750 MG: 500 CHEW TAB at 09:24

## 2024-09-07 RX ADMIN — DOCOSANOL: 100 CREAM TOPICAL at 12:25

## 2024-09-07 RX ADMIN — BACLOFEN 10 MG: 10 TABLET ORAL at 16:49

## 2024-09-07 RX ADMIN — OXYCODONE HYDROCHLORIDE AND ACETAMINOPHEN 1000 MG: 500 TABLET ORAL at 09:25

## 2024-09-07 RX ADMIN — BACLOFEN 10 MG: 10 TABLET ORAL at 09:24

## 2024-09-07 RX ADMIN — DOCOSANOL: 100 CREAM TOPICAL at 05:55

## 2024-09-07 RX ADMIN — OXYBUTYNIN CHLORIDE 5 MG: 5 TABLET ORAL at 22:00

## 2024-09-07 RX ADMIN — CARBIDOPA AND LEVODOPA 1 TABLET: 25; 100 TABLET ORAL at 12:24

## 2024-09-07 RX ADMIN — Medication 20 MG: at 09:37

## 2024-09-07 RX ADMIN — CARBIDOPA AND LEVODOPA 1 TABLET: 25; 100 TABLET ORAL at 09:25

## 2024-09-07 RX ADMIN — LORAZEPAM 0.5 MG: 0.5 TABLET ORAL at 22:00

## 2024-09-07 RX ADMIN — INSULIN GLARGINE 15 UNITS: 100 INJECTION, SOLUTION SUBCUTANEOUS at 22:00

## 2024-09-07 RX ADMIN — CITALOPRAM HYDROBROMIDE 40 MG: 20 TABLET ORAL at 09:25

## 2024-09-07 RX ADMIN — CARBIDOPA AND LEVODOPA 1 TABLET: 25; 100 TABLET ORAL at 22:00

## 2024-09-07 RX ADMIN — VALPROIC ACID 125 MG: 250 SOLUTION ORAL at 09:23

## 2024-09-07 RX ADMIN — FERROUS SULFATE 300 MG: 300 SOLUTION ORAL at 16:49

## 2024-09-07 RX ADMIN — METHENAMINE HIPPURATE 1 G: 1 TABLET ORAL at 18:51

## 2024-09-07 RX ADMIN — MIDODRINE HYDROCHLORIDE 5 MG: 5 TABLET ORAL at 12:24

## 2024-09-07 RX ADMIN — DEXTROSE MONOHYDRATE 50 ML: 25 INJECTION, SOLUTION INTRAVENOUS at 17:20

## 2024-09-07 RX ADMIN — LEVOTHYROXINE SODIUM 25 MCG: 25 TABLET ORAL at 09:24

## 2024-09-07 RX ADMIN — MIDODRINE HYDROCHLORIDE 5 MG: 5 TABLET ORAL at 16:49

## 2024-09-07 RX ADMIN — ENOXAPARIN SODIUM 40 MG: 40 INJECTION SUBCUTANEOUS at 09:25

## 2024-09-07 RX ADMIN — BACLOFEN 10 MG: 10 TABLET ORAL at 22:00

## 2024-09-07 RX ADMIN — CALCIUM CARBONATE (ANTACID) CHEW TAB 500 MG 750 MG: 500 CHEW TAB at 22:00

## 2024-09-07 RX ADMIN — OXYBUTYNIN CHLORIDE 5 MG: 5 TABLET ORAL at 09:26

## 2024-09-07 RX ADMIN — DOCOSANOL: 100 CREAM TOPICAL at 22:10

## 2024-09-07 RX ADMIN — Medication 1000 UNITS: at 09:24

## 2024-09-07 RX ADMIN — VALPROIC ACID 125 MG: 250 SOLUTION ORAL at 22:00

## 2024-09-07 RX ADMIN — MIDODRINE HYDROCHLORIDE 5 MG: 5 TABLET ORAL at 05:54

## 2024-09-07 RX ADMIN — FERROUS SULFATE 300 MG: 300 SOLUTION ORAL at 05:54

## 2024-09-07 RX ADMIN — DOCOSANOL: 100 CREAM TOPICAL at 18:10

## 2024-09-07 RX ADMIN — OXYBUTYNIN CHLORIDE 5 MG: 5 TABLET ORAL at 16:49

## 2024-09-07 RX ADMIN — CARBIDOPA AND LEVODOPA 1 TABLET: 25; 100 TABLET ORAL at 18:00

## 2024-09-07 NOTE — PROGRESS NOTES
[facility]  Progress Note  Name: Terrie Alicea I  MRN: [id number]  Unit/Bed#: S -01 I Date of Admission: 8/24/2024   Date of Service: 9/7/2024 I Hospital Day: 14    Assessment & Plan   * Dysphagia  Assessment & Plan  CT neck no organic obstructions in the mediastinum. Pt has had poor PO intake, malnutrition requiring gastrostomy tube placement on 08/26. Per group home, pt attends day program which cannot manage tube feeds so requesting her to receive it from 6pm to 6am. Settings adjusted to take this into consideration per nutrition.     Plan:  Per Nutrition: Recommend trial cyclic feeds of Glucerna 1.2 @ 80 ml/hr X 12 hrs (Run 6 pm-6am). This provides 960 ml total volume, 1152 kcals, 58 g protein, and 109 g CHO. If blood sugars remain elevated on cyclic feeds, switch to continuous feeds of Glucerna 1.2 @ 40 ml/hr X 24 hrs to see if lower volume/more time improves blood sugars. Re-consult RD as needed.   Continue Glucerna 1.2 as tolerated per nutrition   60 mL water flush before & after TF cycle  70 mL q2h water flushes  Per SLP: can introduce pleasure feeds or puree & HTL as tolerated    DM (diabetes mellitus) (HCC)  Assessment & Plan  Lab Results   Component Value Date    HGBA1C 6.2 (H) 08/13/2024     Recent Labs     09/06/24  2035 09/06/24  2343 09/07/24  0630 09/07/24  1054   POCGLU 90 127 138 90     Blood Sugar Average: Last 72 hrs:  (P) [id number]    PMHx of diabetes. Pt having hyperglycemic episodes in the setting of tube feeds, possibly related to refeeding syndrome although pt has not had hypokalemia, hypomagnesemia, hypophosphatemia. Required insulin drip on 8/29 as BG>500, discontinued when BG<200. However, pt became hyperglycemic overnight 8/30 in the setting of tube feeds, MAP ~54 w minimal response to fluids and albumin, transferred to ICU. Per review of crit care notes, pt was briefly on Levophed; low suspicion of infectious etiology as pt has not had  "leukocytosis and has been afebrile, imaging unremarkable. Pt stable, nutrition switched feed from Jevity 1.5 to Glucerna 1.2, transferred back to med/surg on 8/31. Pt noted to be hyperglycemic, could be attributable to underlying infectious process as well. Pt was doing well on current insulin regimen, however today pt noted to have BG 52 in the afternoon and was given D5 bolus.    Plan:  - Decreased Lantus to 15 u qHS d/t hypoglycemia, blood sugars are in better range, around 130s over today, no hypoglycemic episodes  - Continue insulin sliding scale, algorithm 3  - BG checks q6h  - Repeat CMP    UTI (urinary tract infection)  Assessment & Plan  Previous culture 8/20-grew E. coli, pansensitive. CT abdomen pelvis demonstrated wall thickening of urinary bladder. Concurrent aspiration pneumonia. Pt completed course of IV abx at that time. Repeat UA unremarkable 9/3.      Fever of unknown origin, likely aspiration pneumonia  Assessment & Plan  On 9/1 pt noted to be spiking fevers intermittently. Tmax recorded as 102.4F on 9/3. CXR 9/1 was ordered to r/o aspiration PNA vs atelectasis; showed no acute cardiopulmonary process, although did reveal dilated bowel loops in LUQ. However, pt is passing flatus and having bowel movements. CTAP 8/30 showed postoperative air compatible with open g-tube placement, no evidence of perforation. Repeat skin exam 9/3 without any acutely infected ulcers, disruption of skin integrity, injuries, tenderness. Site of g-tube is healing appropriately. Pt is unable to participate in incentive spirometry for atelectasis prophylaxis. COVID/Flu/RSV and UA negative 9/3. Blood cultures negative. Procalcitonin & lactic acid unremarkable, no leukocytosis. Per report from CT chest, abdomen, pelvis w contrast report 9/3 shows new opacification of \"R lower lobe bronchus, R atelectasis, w sequela of aspiration in R lower lobe. Initiated IV Rocephin 1 g on 9/4. Pt afebrile, no tachycardia, no tachypnea.  "     Plan:  - Continue IV Rocephin 1 g. Can consider switching to PO Keflex upon discharge.  - Symptomatic relief w IVPB Ofirmev 630 q8h, and ice packs  - VS q4h    Mood disturbances  Assessment & Plan  - Continue home meds: Abilify 2 mg, Citalopram 40 mg.  - Pt previously on trazodone 100 mg q HS which was discontinued on 8/30 while in the ICU d/t contributions to sedation.    - Continue Depakene oral solution 125 mg q12h for mood, can defer changes per outpatient psychiatry as deemed necessary.  - Pt has been stable from this standpoint throughout hospitalization, hasn't required restarting of trazodone or escalation in valproic acid thus far.     Parkinson's disease  Assessment & Plan  History of mood disturbances, previously on neuroleptics. Tardive dyskinesia on exam.    Sinemet  qid. Can defer changes to outpatient neurology.     Incontinence  Assessment & Plan  Continue oxybutynin 5 mg TID    Hypotension  Assessment & Plan  Pt noted to be hypotensive intermittently throughout hospitalization. Per caregivers and record review, pt's BP tends to run on softer side. Today RN informed team pt had BP of 82/51 and similar read on repeat; pt was then given 500 cc NS bolus with resolution. Not associated with tachycardia, tachypnea, distress. Physical exam at baseline.    - Midodrine 5 mg TID  - Compression stockings  - VS q4h  - current water flush regimen: 60 mL before & after TF cycle; 70 mL q2h    Cerebral palsy (HCC)  Assessment & Plan  Continue baclofen 10 mg TID         VTE Pharmacologic Prophylaxis: VTE Score: 7 High Risk (Score >/= 5) - Pharmacological DVT Prophylaxis Ordered: enoxaparin (Lovenox). Sequential Compression Devices Ordered.    Mobility:   Basic Mobility Inpatient Raw Score: 6  JH-HLM Goal: 2: Bed activities/Dependent transfer  JH-HLM Achieved: 2: Bed activities/Dependent transfer  JH-HLM Goal NOT achieved. Continue with multidisciplinary rounding and encourage appropriate mobility to  improve upon [facility] goals.    Patient Centered Rounds: I performed bedside rounds with nursing staff today.  Discussions with Specialists or Other Care Team Provider: None    Education and Discussions with Family / Patient: Attempted to update  (charge nurse at facility) via phone. Unable to contact.    Current Length of Stay: 14 day(s)  Current Patient Status: Inpatient   Discharge Plan: Anticipate discharge in 24-48 hrs to group home.    Code Status: Level 1 - Full Code    Subjective:   Patient seen today on morning rounds.  Only change made today his Lantus dose decreased to 15 units daily.  Patient had good blood sugars over the last 12 hours, mostly in the 130s.  No hypoglycemic episodes.  Decreasing Lantus only because in order to avoid hypoglycemia, okay for blood sugars to go up to 180 if necessary in this patient.    Objective:     Vitals:   Temp (24hrs), Av °F (37.2 °C), Min:98.5 °F (36.9 °C), Max:99.5 °F (37.5 °C)    Temp:  [98.5 °F (36.9 °C)-99.5 °F (37.5 °C)] 98.7 °F (37.1 °C)  HR:  [55-77] 55  Resp:  [16] 16  BP: (106-116)/(54-76) 116/62  SpO2:  [94 %-98 %] 98 %  Body mass index is 21.01 kg/m².     Input and Output Summary (last 24 hours):     Intake/Output Summary (Last 24 hours) at 2024 1315  Last data filed at 2024 0601  Gross per 24 hour   Intake 920 ml   Output --   Net 920 ml       Physical Exam:   Physical Exam  Vitals and nursing note reviewed.   Constitutional:       General: She is not in acute distress.     Appearance: She is well-developed.   HENT:      Head: Normocephalic and atraumatic.   Eyes:      Conjunctiva/sclera: Conjunctivae normal.   Cardiovascular:      Rate and Rhythm: Normal rate and regular rhythm.      Heart sounds: Normal heart sounds. No murmur heard.  Pulmonary:      Effort: Pulmonary effort is normal. No respiratory distress.      Breath sounds: Normal breath sounds. No wheezing or rales.   Abdominal:      General: There is no distension.       Palpations: Abdomen is soft.      Tenderness: There is no abdominal tenderness. There is no guarding.   Musculoskeletal:         General: No swelling.      Cervical back: Neck supple.      Right lower leg: No edema.      Left lower leg: No edema.   Skin:     General: Skin is warm and dry.      Capillary Refill: Capillary refill takes less than 2 seconds.   Neurological:      Mental Status: She is alert. Mental status is at baseline.   Psychiatric:         Mood and Affect: Mood normal.          Additional Data:     Labs:  Results from last 7 days   Lab Units 09/07/24  0558 09/06/24  0543 09/05/24  0440   WBC Thousand/uL 5.23   < > 5.61   HEMOGLOBIN g/dL 9.3*   < > 8.7*   HEMATOCRIT % 29.5*   < > 27.5*   PLATELETS Thousands/uL 200   < > 186   SEGS PCT %  --   --  41*   LYMPHO PCT %  --   --  38   MONO PCT %  --   --  9   EOS PCT %  --   --  11*    < > = values in this interval not displayed.     Results from last 7 days   Lab Units 09/07/24  0558 09/06/24  0543 09/05/24  0440   SODIUM mmol/L 140   < > 139   POTASSIUM mmol/L 5.1   < > 4.7   CHLORIDE mmol/L 100   < > 101   CO2 mmol/L 36*   < > 35*   BUN mg/dL 27*   < > 24   CREATININE mg/dL 0.97   < > 0.88   ANION GAP mmol/L 4   < > 3*   CALCIUM mg/dL 10.4*   < > 9.4   ALBUMIN g/dL  --   --  3.1*   TOTAL BILIRUBIN mg/dL  --   --  0.18*   ALK PHOS U/L  --   --  43   ALT U/L  --   --  8   AST U/L  --   --  18   GLUCOSE RANDOM mg/dL 133   < > 127    < > = values in this interval not displayed.         Results from last 7 days   Lab Units 09/07/24  1054 09/07/24  0630 09/06/24  2343 09/06/24  2035 09/06/24  1531 09/06/24  1416 09/06/24  1325 09/06/24  0533 09/06/24  0020 09/05/24  2002 09/05/24  1525 09/05/24  1252   POC GLUCOSE mg/dl 90 138 127 90 89 145* 52* 180* 176* 106 65 98         Results from last 7 days   Lab Units 09/05/24  0440 09/03/24  0323 09/02/24  0456 09/01/24  0349   LACTIC ACID mmol/L 1.0 1.8  --  1.1   PROCALCITONIN ng/ml 0.09  --  0.12  --         Lines/Drains:  Invasive Devices       Peripheral Intravenous Line  Duration             Peripheral IV 09/03/24 Distal;Left;Ventral (anterior) Forearm 4 days    Peripheral IV 09/07/24 Dorsal (posterior);Right Forearm <1 day              Drain  Duration             Gastrostomy/Enterostomy  16 Fr. LUQ 11 days                          Imaging: No pertinent imaging reviewed.    Recent Cultures (last 7 days):   Results from last 7 days   Lab Units 09/03/24  0324   BLOOD CULTURE  No Growth After 4 Days.  No Growth After 4 Days.       Last 24 Hours Medication List:   Current Facility-Administered Medications   Medication Dose Route Frequency Provider Last Rate    acetaminophen  15 mg/kg Intravenous Q8H PRN [place]  mg (09/03/24 0038)    ammonium lactate   Topical BID PRN Lisa Carty MD      ARIPiprazole  2 mg Per G Tube HS Lisa Carty MD      artificial tear   Both Eyes HS PRN Lisa Carty MD      vitamin C  1,000 mg Per G Tube Daily Lisa Carty MD      baclofen  10 mg Per G Tube TID Lisa Carty MD      calcium carbonate  750 mg Oral BID Lisa Carty MD      carbamide peroxide  2 drop Both Ears BID PRN Lisa Carty MD      carbidopa-levodopa  1 tablet Oral 4x Daily Lisa Carty MD      cefTRIAXone  1,000 mg Intravenous Q24H Karen Moreno MD 1,000 mg (09/07/24 0934)    cholecalciferol  1,000 Units Per G Tube Daily Lisa Carty MD      citalopram  40 mg Per G Tube Daily Lisa Carty MD      dextromethorphan-guaiFENesin  10 mL Per G Tube Q4H PRN Lisa Carty MD      docosanol   Topical 5x Daily Lisa Carty MD      enoxaparin  40 mg Subcutaneous Daily Lisa Carty MD      Ferrous Sulfate  300 mg Oral BID AC Lisa Carty MD      fluticasone  1 spray Nasal Daily PRN Lisa Carty MD      hydrocortisone   Topical 4x Daily PRN Lisa Carty MD      insulin glargine  15 Units Subcutaneous HS Yolanda Kay DO      insulin lispro  1-6 Units Subcutaneous Q6H [facility] Lisa Carty MD      Ketotifen Fumarate  1 drop Both Eyes Daily PRN  Lisa Carty MD      levothyroxine  25 mcg Per G Tube QAM Lisa Carty MD      Loratadine  10 mg Oral Daily PRN Lisa Carty MD      LORazepam  0.5 mg Per G Tube HS Sonya Young MD      methenamine hippurate  1 g Oral BID Lisa Carty MD      midodrine  5 mg Oral TID AC Silvana Mike DO      omeprazole (PRILOSEC) suspension 2 mg/mL  20 mg Oral Daily Lisa Carty MD      oxybutynin  5 mg Per G Tube TID Lisa Carty MD      polyethylene glycol  17 g Oral Daily Lisa Carty MD      pravastatin  40 mg Per G Tube Daily With Dinner Lisa Carty MD      valproic acid  125 mg Oral Q12H [facility] Silvana Mike DO          Today, Patient Was Seen By: Janel Corbin DO    **Please Note: This note may have been constructed using a voice recognition system.**

## 2024-09-08 PROBLEM — J69.0 ASPIRATION PNEUMONIA (HCC): Status: ACTIVE | Noted: 2024-09-03

## 2024-09-08 LAB
BACTERIA BLD CULT: NORMAL
BACTERIA BLD CULT: NORMAL
GLUCOSE SERPL-MCNC: 153 MG/DL (ref 65–140)
GLUCOSE SERPL-MCNC: 175 MG/DL (ref 65–140)
GLUCOSE SERPL-MCNC: 203 MG/DL (ref 65–140)
GLUCOSE SERPL-MCNC: 203 MG/DL (ref 65–140)
GLUCOSE SERPL-MCNC: 225 MG/DL (ref 65–140)
GLUCOSE SERPL-MCNC: 75 MG/DL (ref 65–140)

## 2024-09-08 PROCEDURE — 99232 SBSQ HOSP IP/OBS MODERATE 35: CPT | Performed by: INTERNAL MEDICINE

## 2024-09-08 PROCEDURE — 82948 REAGENT STRIP/BLOOD GLUCOSE: CPT

## 2024-09-08 RX ADMIN — DOCOSANOL: 100 CREAM TOPICAL at 18:01

## 2024-09-08 RX ADMIN — BACLOFEN 10 MG: 10 TABLET ORAL at 21:00

## 2024-09-08 RX ADMIN — CARBIDOPA AND LEVODOPA 1 TABLET: 25; 100 TABLET ORAL at 21:00

## 2024-09-08 RX ADMIN — CARBIDOPA AND LEVODOPA 1 TABLET: 25; 100 TABLET ORAL at 13:14

## 2024-09-08 RX ADMIN — FERROUS SULFATE 300 MG: 300 SOLUTION ORAL at 18:01

## 2024-09-08 RX ADMIN — DOCOSANOL: 100 CREAM TOPICAL at 06:17

## 2024-09-08 RX ADMIN — VALPROIC ACID 125 MG: 250 SOLUTION ORAL at 21:00

## 2024-09-08 RX ADMIN — OXYBUTYNIN CHLORIDE 5 MG: 5 TABLET ORAL at 21:00

## 2024-09-08 RX ADMIN — CITALOPRAM HYDROBROMIDE 40 MG: 20 TABLET ORAL at 09:26

## 2024-09-08 RX ADMIN — INSULIN HUMAN 15 UNITS: 100 INJECTION, SUSPENSION SUBCUTANEOUS at 22:30

## 2024-09-08 RX ADMIN — CARBIDOPA AND LEVODOPA 1 TABLET: 25; 100 TABLET ORAL at 09:27

## 2024-09-08 RX ADMIN — CARBIDOPA AND LEVODOPA 1 TABLET: 25; 100 TABLET ORAL at 18:01

## 2024-09-08 RX ADMIN — POLYETHYLENE GLYCOL 3350 17 G: 17 POWDER, FOR SOLUTION ORAL at 09:26

## 2024-09-08 RX ADMIN — VALPROIC ACID 125 MG: 250 SOLUTION ORAL at 09:26

## 2024-09-08 RX ADMIN — CALCIUM CARBONATE (ANTACID) CHEW TAB 500 MG 750 MG: 500 CHEW TAB at 21:00

## 2024-09-08 RX ADMIN — ARIPIPRAZOLE 2 MG: 2 TABLET ORAL at 22:24

## 2024-09-08 RX ADMIN — DOCOSANOL: 100 CREAM TOPICAL at 10:39

## 2024-09-08 RX ADMIN — OXYBUTYNIN CHLORIDE 5 MG: 5 TABLET ORAL at 09:26

## 2024-09-08 RX ADMIN — CEFTRIAXONE SODIUM 1000 MG: 10 INJECTION, POWDER, FOR SOLUTION INTRAVENOUS at 10:39

## 2024-09-08 RX ADMIN — OXYBUTYNIN CHLORIDE 5 MG: 5 TABLET ORAL at 18:07

## 2024-09-08 RX ADMIN — MIDODRINE HYDROCHLORIDE 5 MG: 5 TABLET ORAL at 13:14

## 2024-09-08 RX ADMIN — DOCOSANOL: 100 CREAM TOPICAL at 22:00

## 2024-09-08 RX ADMIN — MIDODRINE HYDROCHLORIDE 5 MG: 5 TABLET ORAL at 09:26

## 2024-09-08 RX ADMIN — Medication 1000 UNITS: at 09:26

## 2024-09-08 RX ADMIN — CALCIUM CARBONATE (ANTACID) CHEW TAB 500 MG 750 MG: 500 CHEW TAB at 09:26

## 2024-09-08 RX ADMIN — ENOXAPARIN SODIUM 40 MG: 40 INJECTION SUBCUTANEOUS at 09:26

## 2024-09-08 RX ADMIN — METHENAMINE HIPPURATE 1 G: 1 TABLET ORAL at 09:44

## 2024-09-08 RX ADMIN — DOCOSANOL: 100 CREAM TOPICAL at 13:19

## 2024-09-08 RX ADMIN — LEVOTHYROXINE SODIUM 25 MCG: 25 TABLET ORAL at 09:26

## 2024-09-08 RX ADMIN — LORAZEPAM 0.5 MG: 0.5 TABLET ORAL at 21:00

## 2024-09-08 RX ADMIN — MIDODRINE HYDROCHLORIDE 5 MG: 5 TABLET ORAL at 18:05

## 2024-09-08 RX ADMIN — METHENAMINE HIPPURATE 1 G: 1 TABLET ORAL at 18:01

## 2024-09-08 RX ADMIN — INSULIN LISPRO 2 UNITS: 100 INJECTION, SOLUTION INTRAVENOUS; SUBCUTANEOUS at 06:16

## 2024-09-08 RX ADMIN — BACLOFEN 10 MG: 10 TABLET ORAL at 09:26

## 2024-09-08 RX ADMIN — OXYCODONE HYDROCHLORIDE AND ACETAMINOPHEN 1000 MG: 500 TABLET ORAL at 09:26

## 2024-09-08 RX ADMIN — Medication 20 MG: at 09:44

## 2024-09-08 RX ADMIN — DEXTROSE MONOHYDRATE 50 ML: 25 INJECTION, SOLUTION INTRAVENOUS at 00:00

## 2024-09-08 RX ADMIN — BACLOFEN 10 MG: 10 TABLET ORAL at 18:01

## 2024-09-08 RX ADMIN — FERROUS SULFATE 300 MG: 300 SOLUTION ORAL at 06:14

## 2024-09-08 RX ADMIN — INSULIN LISPRO 2 UNITS: 100 INJECTION, SOLUTION INTRAVENOUS; SUBCUTANEOUS at 13:10

## 2024-09-08 RX ADMIN — PRAVASTATIN SODIUM 40 MG: 40 TABLET ORAL at 18:01

## 2024-09-08 NOTE — ASSESSMENT & PLAN NOTE
Pt noted to be hypotensive intermittently throughout hospitalization. Per caregivers and record review, pt's BP tends to run on softer side. Pt's BP have been appropriate.      - Midodrine 5 mg TID  - Compression stockings  - VS q4h  - fluid intake per Nutrition, see plan under dysphagia

## 2024-09-08 NOTE — ASSESSMENT & PLAN NOTE
"CT neck no organic obstructions in the mediastinum. Pt has had poor PO intake, malnutrition requiring gastrostomy tube placement on 08/26. Per group home, pt attends day program which cannot manage tube feeds so requesting her to receive it from 6pm to 6am. Settings adjusted to take this into consideration per nutrition.     Plan:  Per Nutrition: \"Adjust tube feed order to: 70ml water before and after running the feeding, 70ml q 2 hours while feeding is running. 1263 mL total fee water daily with formula and flushes. Continue Glucerna 1.2 at 80ml/hr x 12 hours. 960mL glucerna, 1152 kcal daily.\"  Continue Glucerna 1.2 at 80 cc/h x 12h  70 mL water flush before & after TF cycle  70 mL q2h water flushes while feeding is running  1263 mL total free water daily with formula & flushes  Per SLP: can introduce pleasure feeds or puree & HTL as tolerated  "

## 2024-09-08 NOTE — PLAN OF CARE
Problem: Nutrition/Hydration-ADULT  Goal: Nutrient/Hydration intake appropriate for improving, restoring or maintaining nutritional needs  Description: Monitor and assess patient's nutrition/hydration status for malnutrition. Collaborate with interdisciplinary team and initiate plan and interventions as ordered.  Monitor patient's weight and dietary intake as ordered or per policy. Utilize nutrition screening tool and intervene as necessary. Determine patient's food preferences and provide high-protein, high-caloric foods as appropriate.     INTERVENTIONS:  - Monitor oral intake, urinary output, labs, and treatment plans  - Assess nutrition and hydration status and recommend course of action  - Evaluate amount of meals eaten  - Assist patient with eating if necessary   - Allow adequate time for meals  - Recommend/ encourage appropriate diets, oral nutritional supplements, and vitamin/mineral supplements  - Order, calculate, and assess calorie counts as needed  - Recommend, monitor, and adjust tube feedings and TPN/PPN based on assessed needs  - Assess need for intravenous fluids  - Provide specific nutrition/hydration education as appropriate  - Include patient/family/caregiver in decisions related to nutrition  Outcome: Progressing     Problem: Prexisting or High Potential for Compromised Skin Integrity  Goal: Skin integrity is maintained or improved  Description: INTERVENTIONS:  - Identify patients at risk for skin breakdown  - Assess and monitor skin integrity  - Assess and monitor nutrition and hydration status  - Monitor labs   - Assess for incontinence   - Turn and reposition patient  - Assist with mobility/ambulation  - Relieve pressure over bony prominences  - Avoid friction and shearing  - Provide appropriate hygiene as needed including keeping skin clean and dry  - Evaluate need for skin moisturizer/barrier cream  - Collaborate with interdisciplinary team   - Patient/family teaching  - Consider wound  care consult   Outcome: Progressing     Problem: Potential for Falls  Goal: Patient will remain free of falls  Description: INTERVENTIONS:  - Educate patient/family on patient safety including physical limitations  - Instruct patient to call for assistance with activity   - Consult OT/PT to assist with strengthening/mobility   - Keep Call bell within reach  - Keep bed low and locked with side rails adjusted as appropriate  - Keep care items and personal belongings within reach  - Initiate and maintain comfort rounds  - Make Fall Risk Sign visible to staff  - Offer Toileting every  Hours, in advance of need  - Initiate/Maintain alarm  - Obtain necessary fall risk management equipment:   - Apply yellow socks and bracelet for high fall risk patients  - Consider moving patient to room near nurses station  Outcome: Progressing     Problem: GASTROINTESTINAL - ADULT  Goal: Minimal or absence of nausea and/or vomiting  Description: INTERVENTIONS:  - Administer IV fluids if ordered to ensure adequate hydration  - Maintain NPO status until nausea and vomiting are resolved  - Nasogastric tube if ordered  - Administer ordered antiemetic medications as needed  - Provide nonpharmacologic comfort measures as appropriate  - Advance diet as tolerated, if ordered  - Consider nutrition services referral to assist patient with adequate nutrition and appropriate food choices  Outcome: Progressing  Goal: Maintains or returns to baseline bowel function  Description: INTERVENTIONS:  - Assess bowel function  - Encourage oral fluids to ensure adequate hydration  - Administer IV fluids if ordered to ensure adequate hydration  - Administer ordered medications as needed  - Encourage mobilization and activity  - Consider nutritional services referral to assist patient with adequate nutrition and appropriate food choices  Outcome: Progressing  Goal: Maintains adequate nutritional intake  Description: INTERVENTIONS:  - Monitor percentage of each  meal consumed  - Identify factors contributing to decreased intake, treat as appropriate  - Assist with meals as needed  - Monitor I&O, weight, and lab values if indicated  - Obtain nutrition services referral as needed  Outcome: Progressing  Goal: Oral mucous membranes remain intact  Description: INTERVENTIONS  - Assess oral mucosa and hygiene practices  - Implement preventative oral hygiene regimen  - Implement oral medicated treatments as ordered  - Initiate Nutrition services referral as needed  Outcome: Progressing

## 2024-09-08 NOTE — PROGRESS NOTES
"[facility]  Progress Note  Name: Terrie Alicea I  MRN: [id number]  Unit/Bed#: S -01 I Date of Admission: 8/24/2024   Date of Service: 9/8/2024 I Hospital Day: 15    Assessment & Plan   Aspiration pneumonia (HCC)  Assessment & Plan  On 9/1 pt noted to be spiking fevers intermittently. Tmax recorded as 102.4F on 9/3. CXR 9/1 showed no acute cardiopulmonary process. Pt is unable to participate in incentive spirometry for atelectasis prophylaxis. CT chest, abdomen, pelvis w contrast report 9/3 shows new opacification of \"R lower lobe bronchus, R atelectasis, w sequela of aspiration in R lower lobe.\" Initiated IV Rocephin 1 g on 9/4 for aspiration PNA. Pt afebrile, no tachycardia, no tachypnea.    Plan:  - Continue IV Rocephin 1 g, pt receiving last dose today 9/8.  - Symptomatic relief w IVPB Ofirmev 630 q8h, and ice packs  - VS q4h    * Dysphagia  Assessment & Plan  CT neck no organic obstructions in the mediastinum. Pt has had poor PO intake, malnutrition requiring gastrostomy tube placement on 08/26. Per group home, pt attends day program which cannot manage tube feeds so requesting her to receive it from 6pm to 6am. Settings adjusted to take this into consideration per nutrition.     Plan:  Per Nutrition: Recommend trial cyclic feeds of Glucerna 1.2 @ 80 ml/hr X 12 hrs (Run 6 pm-6am). This provides 960 ml total volume, 1152 kcals, 58 g protein, and 109 g CHO. If blood sugars remain elevated on cyclic feeds, switch to continuous feeds of Glucerna 1.2 @ 40 ml/hr X 24 hrs to see if lower volume/more time improves blood sugars. Re-consult RD as needed.   Continue Glucerna 1.2 as tolerated per nutrition   60 mL water flush before & after TF cycle  70 mL q2h water flushes  Per SLP: can introduce pleasure feeds or puree & HTL as tolerated    DM (diabetes mellitus) (HCC)  Assessment & Plan  Lab Results   Component Value Date    HGBA1C 6.2 (H) 08/13/2024     Recent Labs     09/08/24  0410 " 09/08/24  0601 09/08/24  0724 09/08/24  1209   POCGLU 175* 203* 203* 225*     Blood Sugar Average: Last 72 hrs:  (P) 131.997554475879902    PMHx of diabetes. Pt having hyperglycemic episodes in the setting of tube feeds, possibly related to refeeding syndrome although pt has not had hypokalemia, hypomagnesemia, hypophosphatemia. Required insulin drip on 8/29 as BG>500, discontinued when BG<200. However, pt became hyperglycemic overnight 8/30 in the setting of tube feeds, MAP ~54 w minimal response to fluids and albumin, transferred to ICU. Per review of crit care notes, pt was briefly on Levophed; low suspicion of infectious etiology as pt has not had leukocytosis and has been afebrile, imaging unremarkable. Pt stable, nutrition switched feed from Jevity 1.5 to Glucerna 1.2, transferred back to med/surg on 8/31. Pt noted to be hyperglycemic, could be attributable to underlying infectious process as well. Pt was doing well on 25u Lantus however became hypoglycemic; Lantus decreased to 18u on 9/6, then to 15u on 9/7.     Plan:  - Continue Lantus 15 units qHS.    - if pt becomes hypoglycemic in the evening, can consider switching to 15 units of NPH  - Continue insulin sliding scale, algorithm 3  - BG checks q6h  - Repeat CMP    Hypotension  Assessment & Plan  Pt noted to be hypotensive intermittently throughout hospitalization. Per caregivers and record review, pt's BP tends to run on softer side. Pt's BP have been appropriate.      - Midodrine 5 mg TID  - Compression stockings  - VS q4h  - current water flush regimen: 60 mL before & after TF cycle; 70 mL q2h    Mood disturbances  Assessment & Plan  - Continue home meds: Abilify 2 mg, Citalopram 40 mg.  - Pt previously on trazodone 100 mg q HS which was discontinued on 8/30 while in the ICU d/t contributions to sedation.    - Continue Depakene oral solution 125 mg q12h for mood, can defer changes per outpatient psychiatry as deemed necessary.  - Pt has been stable from  this standpoint throughout hospitalization, hasn't required restarting of trazodone or escalation in valproic acid thus far.     Parkinson's disease  Assessment & Plan  History of mood disturbances, previously on neuroleptics. Tardive dyskinesia on exam.    Sinemet  qid. Can defer changes to outpatient neurology.     Incontinence  Assessment & Plan  Continue oxybutynin 5 mg TID    UTI (urinary tract infection)  Assessment & Plan  Previous culture -grew E. coli, pansensitive. CT abdomen pelvis demonstrated wall thickening of urinary bladder. Concurrent aspiration pneumonia. Pt completed course of IV abx at that time. Repeat UA unremarkable 9/3.      Cerebral palsy (HCC)  Assessment & Plan  Continue baclofen 10 mg TID          VTE Pharmacologic Prophylaxis: VTE Score: 7 High Risk (Score >/= 5) - Pharmacological DVT Prophylaxis Ordered: enoxaparin (Lovenox). Sequential Compression Devices Ordered.    Mobility:   Basic Mobility Inpatient Raw Score: 6  JH-HLM Goal: 2: Bed activities/Dependent transfer  JH-HLM Achieved: 2: Bed activities/Dependent transfer  JH-HLM Goal achieved. Continue to encourage appropriate mobility.    Patient Centered Rounds: I performed bedside rounds with nursing staff today.  Discussions with Specialists or Other Care Team Provider:       Education and Discussions with Family / Patient: Updated  (caregiver at facility) via telephone.    Current Length of Stay: 15 day(s)  Current Patient Status: Inpatient   Discharge Plan: Anticipate discharge in 24-48 hrs to group home.     Code Status: Level 1 - Full Code    Subjective:   Pt seen and examined at bedside today, does not appear to be in any acute distress. She is awake and responds to verbal stimuli. No changes in physical examination.     Objective:     Vitals:   Temp (24hrs), Av °F (37.2 °C), Min:98.2 °F (36.8 °C), Max:99.9 °F (37.7 °C)    Temp:  [98.2 °F (36.8 °C)-99.9 °F (37.7 °C)] 99 °F (37.2 °C)  HR:  [62-73]  65  Resp:  [16-18] 18  BP: ()/(57-73) 103/58  SpO2:  [91 %-100 %] 93 %  Body mass index is 20.41 kg/m².     Input and Output Summary (last 24 hours):     Intake/Output Summary (Last 24 hours) at 9/8/2024 1433  Last data filed at 9/8/2024 0630  Gross per 24 hour   Intake 1380 ml   Output 1599 ml   Net -219 ml       Physical Exam:   Physical Exam  Vitals reviewed.   Constitutional:       General: She is awake. She is not in acute distress.     Interventions: Nasal cannula in place.   HENT:      Right Ear: External ear normal.      Left Ear: External ear normal.      Mouth/Throat:      Comments: Lip smacking (chronic)  Cardiovascular:      Rate and Rhythm: Normal rate and regular rhythm.   Pulmonary:      Effort: No tachypnea or respiratory distress.      Breath sounds: No stridor.   Abdominal:      General: Bowel sounds are normal.      Tenderness: There is no abdominal tenderness. There is no guarding.      Comments: G-tube in place   Musculoskeletal:         General: Deformity present. No swelling or tenderness.   Skin:     General: Skin is warm.      Capillary Refill: Capillary refill takes less than 2 seconds.      Coloration: Skin is not jaundiced.      Findings: No erythema or rash.   Neurological:      Mental Status: Mental status is at baseline.         Additional Data:     Labs:  Results from last 7 days   Lab Units 09/07/24 0558 09/06/24  0543 09/05/24  0440   WBC Thousand/uL 5.23   < > 5.61   HEMOGLOBIN g/dL 9.3*   < > 8.7*   HEMATOCRIT % 29.5*   < > 27.5*   PLATELETS Thousands/uL 200   < > 186   SEGS PCT %  --   --  41*   LYMPHO PCT %  --   --  38   MONO PCT %  --   --  9   EOS PCT %  --   --  11*    < > = values in this interval not displayed.     Results from last 7 days   Lab Units 09/07/24 0558 09/06/24  0543 09/05/24  0440   SODIUM mmol/L 140   < > 139   POTASSIUM mmol/L 5.1   < > 4.7   CHLORIDE mmol/L 100   < > 101   CO2 mmol/L 36*   < > 35*   BUN mg/dL 27*   < > 24   CREATININE mg/dL 0.97    < > 0.88   ANION GAP mmol/L 4   < > 3*   CALCIUM mg/dL 10.4*   < > 9.4   ALBUMIN g/dL  --   --  3.1*   TOTAL BILIRUBIN mg/dL  --   --  0.18*   ALK PHOS U/L  --   --  43   ALT U/L  --   --  8   AST U/L  --   --  18   GLUCOSE RANDOM mg/dL 133   < > 127    < > = values in this interval not displayed.         Results from last 7 days   Lab Units 09/08/24  1209 09/08/24  0724 09/08/24  0601 09/08/24  0410 09/07/24  2342 09/07/24  1746 09/07/24  1707 09/07/24  1054 09/07/24  0630 09/06/24  2343 09/06/24  2035 09/06/24  1531   POC GLUCOSE mg/dl 225* 203* 203* 175* 55* 149* 60* 90 138 127 90 89         Results from last 7 days   Lab Units 09/05/24  0440 09/03/24  0323 09/02/24  0456   LACTIC ACID mmol/L 1.0 1.8  --    PROCALCITONIN ng/ml 0.09  --  0.12       Lines/Drains:  Invasive Devices       Peripheral Intravenous Line  Duration             Peripheral IV 09/03/24 Distal;Left;Ventral (anterior) Forearm 5 days    Peripheral IV 09/07/24 Dorsal (posterior);Right Forearm 1 day              Drain  Duration             Gastrostomy/Enterostomy  16 Fr. LUQ 12 days                      Imaging: Reviewed radiology reports from this admission including: chest xray and abdominal/pelvic CT    Recent Cultures (last 7 days):   Results from last 7 days   Lab Units 09/03/24  0324   BLOOD CULTURE  No Growth After 5 Days.  No Growth After 5 Days.       Last 24 Hours Medication List:   Current Facility-Administered Medications   Medication Dose Route Frequency Provider Last Rate    acetaminophen  15 mg/kg Intravenous Q8H PRN Reg [place]  mg (09/03/24 0038)    ammonium lactate   Topical BID PRN Lisa Carty MD      ARIPiprazole  2 mg Per G Tube HS Lisa Carty MD      artificial tear   Both Eyes HS PRN Lisa Carty MD      vitamin C  1,000 mg Per G Tube Daily Lisa Carty MD      baclofen  10 mg Per G Tube TID Lisa Carty MD      calcium carbonate  750 mg Oral BID Lisa Carty MD      carbamide peroxide  2 drop Both Ears BID PRN Lisa  MD Machelle      carbidopa-levodopa  1 tablet Oral 4x Daily Lisa Carty MD      cefTRIAXone  1,000 mg Intravenous Q24H Karen Moreno MD 1,000 mg (09/08/24 1039)    cholecalciferol  1,000 Units Per G Tube Daily Lisa Carty MD      citalopram  40 mg Per G Tube Daily Lisa Carty MD      dextromethorphan-guaiFENesin  10 mL Per G Tube Q4H PRN Lisa Carty MD      docosanol   Topical 5x Daily Lisa Carty MD      enoxaparin  40 mg Subcutaneous Daily Lisa Carty MD      Ferrous Sulfate  300 mg Oral BID AC Lisa Carty MD      fluticasone  1 spray Nasal Daily PRN Lisa Carty MD      hydrocortisone   Topical 4x Daily PRN Lisa Carty MD      insulin glargine  15 Units Subcutaneous HS Yolanda Kay DO      insulin lispro  1-6 Units Subcutaneous Q6H ROSEMARY Lisa Carty MD      Ketotifen Fumarate  1 drop Both Eyes Daily PRN Lisa Carty MD      levothyroxine  25 mcg Per G Tube QAM Lisa Carty MD      Loratadine  10 mg Oral Daily PRN Lisa Carty MD      LORazepam  0.5 mg Per G Tube HS Sonya Yuliana Young MD      methenamine hippurate  1 g Oral BID Lisa Carty MD      midodrine  5 mg Oral TID AC Silvana Mike DO      omeprazole (PRILOSEC) suspension 2 mg/mL  20 mg Oral Daily Lisa Carty MD      oxybutynin  5 mg Per G Tube TID Lisa Carty MD      polyethylene glycol  17 g Oral Daily Lisa Carty MD      pravastatin  40 mg Per G Tube Daily With Dinner Lisa Carty MD      valproic acid  125 mg Oral Q12H [facility] Silvana Mike DO          Today, Patient Was Seen By: Silvana Mike DO  PGY-1    **Please Note: This note may have been constructed using a voice recognition system.**

## 2024-09-08 NOTE — ASSESSMENT & PLAN NOTE
"On 9/1 pt noted to be spiking fevers intermittently. Tmax recorded as 102.4F on 9/3. CXR 9/1 showed no acute cardiopulmonary process. Pt is unable to participate in incentive spirometry for atelectasis prophylaxis. CT chest, abdomen, pelvis w contrast report 9/3 shows new opacification of \"R lower lobe bronchus, R atelectasis, w sequela of aspiration in R lower lobe.\" Initiated IV Rocephin 1 g on 9/4 for aspiration PNA. Pt afebrile, no tachycardia, no tachypnea. Pt completed ABX course. Continues to be afebrile, not tachycardic or tachypneic.  "

## 2024-09-08 NOTE — ASSESSMENT & PLAN NOTE
Lab Results   Component Value Date    HGBA1C 6.2 (H) 08/13/2024     Recent Labs     09/10/24  1236 09/10/24  1713 09/10/24  1732 09/10/24  1820   POCGLU 161* 57* 58* 222*     Blood Sugar Average: Last 72 hrs:  (P) [id number]    PMHx of diabetes. Pt having hyperglycemic episodes in the setting of tube feeds, possibly related to refeeding syndrome although pt has not had hypokalemia, hypomagnesemia, hypophosphatemia. Required insulin drip on 8/29 as BG>500, discontinued when BG<200. However, pt became hyperglycemic overnight 8/30 in the setting of tube feeds, MAP ~54 w minimal response to fluids and albumin, transferred to ICU. Per review of crit care notes, pt was briefly on Levophed; low suspicion of infectious etiology as pt has not had leukocytosis and has been afebrile, imaging unremarkable. Pt stable, nutrition switched feed from Jevity 1.5 to Glucerna 1.2, transferred back to med/surg on 8/31. Pt was doing well on 25u Lantus however became hypoglycemic; Lantus decreased to 18u on 9/6, then to 15u on 9/7. On 9/8 switched Lantus 15u to NPH 15u to prevent evening hypoglycemia in the setting of cyclic tube feeds; however pt still hyperglycemic w BG>200 in the AM. 9/9 started NPH 20u at 6PM, and 5u at 6AM, to correspond to tube feed timeline. BGs improved.    - Continue NPH 20 units at 6PM, to be given when initiating tube feeds.  - Continue NPH 5 units at 6AM to be given when tube feeds are complete.   - Continue insulin sliding scale, algorithm 3  - BG checks q6h  - Hypoglycemia protocol in place

## 2024-09-09 PROBLEM — N39.0 UTI (URINARY TRACT INFECTION): Status: RESOLVED | Noted: 2017-03-27 | Resolved: 2024-09-09

## 2024-09-09 LAB
ANION GAP SERPL CALCULATED.3IONS-SCNC: 7 MMOL/L (ref 4–13)
BUN SERPL-MCNC: 34 MG/DL (ref 5–25)
CALCIUM SERPL-MCNC: 10.3 MG/DL (ref 8.4–10.2)
CHLORIDE SERPL-SCNC: 94 MMOL/L (ref 96–108)
CO2 SERPL-SCNC: 34 MMOL/L (ref 21–32)
CREAT SERPL-MCNC: 1.17 MG/DL (ref 0.6–1.3)
DME PARACHUTE DELIVERY DATE ACTUAL: NORMAL
DME PARACHUTE DELIVERY DATE REQUESTED: NORMAL
DME PARACHUTE ITEM DESCRIPTION: NORMAL
DME PARACHUTE ORDER STATUS: NORMAL
DME PARACHUTE SUPPLIER NAME: NORMAL
DME PARACHUTE SUPPLIER PHONE: NORMAL
ERYTHROCYTE [DISTWIDTH] IN BLOOD BY AUTOMATED COUNT: 13.3 % (ref 11.6–15.1)
GFR SERPL CREATININE-BSD FRML MDRD: 50 ML/MIN/1.73SQ M
GLUCOSE SERPL-MCNC: 202 MG/DL (ref 65–140)
GLUCOSE SERPL-MCNC: 237 MG/DL (ref 65–140)
GLUCOSE SERPL-MCNC: 248 MG/DL (ref 65–140)
GLUCOSE SERPL-MCNC: 254 MG/DL (ref 65–140)
GLUCOSE SERPL-MCNC: 96 MG/DL (ref 65–140)
HCT VFR BLD AUTO: 29.9 % (ref 34.8–46.1)
HGB BLD-MCNC: 9.7 G/DL (ref 11.5–15.4)
MCH RBC QN AUTO: 32.8 PG (ref 26.8–34.3)
MCHC RBC AUTO-ENTMCNC: 32.4 G/DL (ref 31.4–37.4)
MCV RBC AUTO: 101 FL (ref 82–98)
PLATELET # BLD AUTO: 213 THOUSANDS/UL (ref 149–390)
PMV BLD AUTO: 12.6 FL (ref 8.9–12.7)
POTASSIUM SERPL-SCNC: 4.8 MMOL/L (ref 3.5–5.3)
RBC # BLD AUTO: 2.96 MILLION/UL (ref 3.81–5.12)
SODIUM SERPL-SCNC: 135 MMOL/L (ref 135–147)
WBC # BLD AUTO: 6.18 THOUSAND/UL (ref 4.31–10.16)

## 2024-09-09 PROCEDURE — 85027 COMPLETE CBC AUTOMATED: CPT

## 2024-09-09 PROCEDURE — 99232 SBSQ HOSP IP/OBS MODERATE 35: CPT | Performed by: INTERNAL MEDICINE

## 2024-09-09 PROCEDURE — 80048 BASIC METABOLIC PNL TOTAL CA: CPT

## 2024-09-09 PROCEDURE — 82948 REAGENT STRIP/BLOOD GLUCOSE: CPT

## 2024-09-09 RX ADMIN — CARBIDOPA AND LEVODOPA 1 TABLET: 25; 100 TABLET ORAL at 17:50

## 2024-09-09 RX ADMIN — METHENAMINE HIPPURATE 1 G: 1 TABLET ORAL at 17:51

## 2024-09-09 RX ADMIN — MIDODRINE HYDROCHLORIDE 5 MG: 5 TABLET ORAL at 11:55

## 2024-09-09 RX ADMIN — POLYETHYLENE GLYCOL 3350 17 G: 17 POWDER, FOR SOLUTION ORAL at 08:59

## 2024-09-09 RX ADMIN — BACLOFEN 10 MG: 10 TABLET ORAL at 08:58

## 2024-09-09 RX ADMIN — LORATADINE 10 MG: 5 SOLUTION ORAL at 09:00

## 2024-09-09 RX ADMIN — OXYCODONE HYDROCHLORIDE AND ACETAMINOPHEN 1000 MG: 500 TABLET ORAL at 08:59

## 2024-09-09 RX ADMIN — Medication 1000 UNITS: at 08:58

## 2024-09-09 RX ADMIN — ENOXAPARIN SODIUM 40 MG: 40 INJECTION SUBCUTANEOUS at 08:59

## 2024-09-09 RX ADMIN — LORAZEPAM 0.5 MG: 0.5 TABLET ORAL at 21:54

## 2024-09-09 RX ADMIN — CALCIUM CARBONATE (ANTACID) CHEW TAB 500 MG 750 MG: 500 CHEW TAB at 08:57

## 2024-09-09 RX ADMIN — CITALOPRAM HYDROBROMIDE 40 MG: 20 TABLET ORAL at 08:58

## 2024-09-09 RX ADMIN — INSULIN LISPRO 3 UNITS: 100 INJECTION, SOLUTION INTRAVENOUS; SUBCUTANEOUS at 00:10

## 2024-09-09 RX ADMIN — CALCIUM CARBONATE (ANTACID) CHEW TAB 500 MG 750 MG: 500 CHEW TAB at 21:55

## 2024-09-09 RX ADMIN — CEFTRIAXONE SODIUM 1000 MG: 10 INJECTION, POWDER, FOR SOLUTION INTRAVENOUS at 09:15

## 2024-09-09 RX ADMIN — VALPROIC ACID 125 MG: 250 SOLUTION ORAL at 08:57

## 2024-09-09 RX ADMIN — DOCOSANOL: 100 CREAM TOPICAL at 11:56

## 2024-09-09 RX ADMIN — FERROUS SULFATE 300 MG: 300 SOLUTION ORAL at 06:21

## 2024-09-09 RX ADMIN — CARBIDOPA AND LEVODOPA 1 TABLET: 25; 100 TABLET ORAL at 21:55

## 2024-09-09 RX ADMIN — INSULIN HUMAN 20 UNITS: 100 INJECTION, SUSPENSION SUBCUTANEOUS at 17:50

## 2024-09-09 RX ADMIN — BACLOFEN 10 MG: 10 TABLET ORAL at 17:50

## 2024-09-09 RX ADMIN — DOCOSANOL: 100 CREAM TOPICAL at 22:00

## 2024-09-09 RX ADMIN — OXYBUTYNIN CHLORIDE 5 MG: 5 TABLET ORAL at 17:51

## 2024-09-09 RX ADMIN — MIDODRINE HYDROCHLORIDE 5 MG: 5 TABLET ORAL at 06:21

## 2024-09-09 RX ADMIN — CARBIDOPA AND LEVODOPA 1 TABLET: 25; 100 TABLET ORAL at 11:55

## 2024-09-09 RX ADMIN — Medication 20 MG: at 09:13

## 2024-09-09 RX ADMIN — CARBIDOPA AND LEVODOPA 1 TABLET: 25; 100 TABLET ORAL at 08:58

## 2024-09-09 RX ADMIN — INSULIN LISPRO 2 UNITS: 100 INJECTION, SOLUTION INTRAVENOUS; SUBCUTANEOUS at 12:08

## 2024-09-09 RX ADMIN — DOCOSANOL: 100 CREAM TOPICAL at 06:22

## 2024-09-09 RX ADMIN — INSULIN LISPRO 3 UNITS: 100 INJECTION, SOLUTION INTRAVENOUS; SUBCUTANEOUS at 09:36

## 2024-09-09 RX ADMIN — LEVOTHYROXINE SODIUM 25 MCG: 25 TABLET ORAL at 08:58

## 2024-09-09 RX ADMIN — VALPROIC ACID 125 MG: 250 SOLUTION ORAL at 21:30

## 2024-09-09 RX ADMIN — ARIPIPRAZOLE 2 MG: 2 TABLET ORAL at 21:55

## 2024-09-09 RX ADMIN — OXYBUTYNIN CHLORIDE 5 MG: 5 TABLET ORAL at 21:30

## 2024-09-09 RX ADMIN — DOCOSANOL: 100 CREAM TOPICAL at 17:50

## 2024-09-09 RX ADMIN — OXYBUTYNIN CHLORIDE 5 MG: 5 TABLET ORAL at 08:58

## 2024-09-09 RX ADMIN — MIDODRINE HYDROCHLORIDE 5 MG: 5 TABLET ORAL at 17:50

## 2024-09-09 RX ADMIN — PRAVASTATIN SODIUM 40 MG: 40 TABLET ORAL at 17:50

## 2024-09-09 RX ADMIN — METHENAMINE HIPPURATE 1 G: 1 TABLET ORAL at 09:00

## 2024-09-09 RX ADMIN — DOCOSANOL: 100 CREAM TOPICAL at 13:15

## 2024-09-09 RX ADMIN — BACLOFEN 10 MG: 10 TABLET ORAL at 21:30

## 2024-09-09 RX ADMIN — FERROUS SULFATE 300 MG: 300 SOLUTION ORAL at 17:50

## 2024-09-09 NOTE — PROGRESS NOTES
"[facility]  Progress Note  Name: Terrie Alicea I  MRN: [id number]  Unit/Bed#: S -01 I Date of Admission: 8/24/2024   Date of Service: 9/9/2024 I Hospital Day: 16    Assessment & Plan   * Dysphagia  Assessment & Plan  CT neck no organic obstructions in the mediastinum. Pt has had poor PO intake, malnutrition requiring gastrostomy tube placement on 08/26. Per group home, pt attends day program which cannot manage tube feeds so requesting her to receive it from 6pm to 6am. Settings adjusted to take this into consideration per nutrition.     Plan:  Per Nutrition: \"Adjust tube feed order to: 70ml water before and after running the feeding, 70ml q 2 hours while feeding is running. 1263 mL total fee water daily with formula and flushes. Continue Glucerna 1.2 at 80ml/hr x 12 hours. 960mL glucerna, 1152 kcal daily.\"  Continue Glucerna 1.2 at 80 cc/h x 12h  70 mL water flush before & after TF cycle  70 mL q2h water flushes while feeding is running  1263 mL total free water daily with formula & flushes  Per SLP: can introduce pleasure feeds or puree & HTL as tolerated    DM (diabetes mellitus) (HCC)  Assessment & Plan  Lab Results   Component Value Date    HGBA1C 6.2 (H) 08/13/2024     Recent Labs     09/08/24  2115 09/09/24  0004 09/09/24  0725 09/09/24  1116   POCGLU 153* 254* 248* 202*     Blood Sugar Average: Last 72 hrs:  (P) [id number]    PMHx of diabetes. Pt having hyperglycemic episodes in the setting of tube feeds, possibly related to refeeding syndrome although pt has not had hypokalemia, hypomagnesemia, hypophosphatemia. Required insulin drip on 8/29 as BG>500, discontinued when BG<200. However, pt became hyperglycemic overnight 8/30 in the setting of tube feeds, MAP ~54 w minimal response to fluids and albumin, transferred to ICU. Per review of crit care notes, pt was briefly on Levophed; low suspicion of infectious etiology as pt has not had leukocytosis and has been " "afebrile, imaging unremarkable. Pt stable, nutrition switched feed from Jevity 1.5 to Glucerna 1.2, transferred back to med/surg on 8/31. Pt was doing well on 25u Lantus however became hypoglycemic; Lantus decreased to 18u on 9/6, then to 15u on 9/7. On 9/8 switched Lantus 15u to NPH 15u to prevent evening hypoglycemia in the setting of cyclic tube feeds; however pt still hyperglycemic w BG>200 in the AM.     Plan:  - Increase to NPH 20 units at 6PM, to be given when initiating tube feeds.  - Initiate NPH 5 units at 6AM to be given when tube feeds are complete.   - Continue insulin sliding scale, algorithm 3  - BG checks q6h  - Hypoglycemia protocol in place    Aspiration pneumonia (HCC)  Assessment & Plan  On 9/1 pt noted to be spiking fevers intermittently. Tmax recorded as 102.4F on 9/3. CXR 9/1 showed no acute cardiopulmonary process. Pt is unable to participate in incentive spirometry for atelectasis prophylaxis. CT chest, abdomen, pelvis w contrast report 9/3 shows new opacification of \"R lower lobe bronchus, R atelectasis, w sequela of aspiration in R lower lobe.\" Initiated IV Rocephin 1 g on 9/4 for aspiration PNA. Pt afebrile, no tachycardia, no tachypnea. Pt completed ABX course. Continues to be afebrile, not tachycardic or tachypneic.    Hypotension  Assessment & Plan  Pt noted to be hypotensive intermittently throughout hospitalization. Per caregivers and record review, pt's BP tends to run on softer side. Pt's BP have been appropriate.      - Midodrine 5 mg TID  - Compression stockings  - VS q4h  - fluid intake per Nutrition, see plan under dysphagia    Mood disturbances  Assessment & Plan  - Continue home meds: Abilify 2 mg, Citalopram 40 mg.  - Pt previously on trazodone 100 mg q HS which was discontinued on 8/30 while in the ICU d/t contributions to sedation.    - Continue Depakene oral solution 125 mg q12h for mood, can defer changes per outpatient psychiatry as deemed necessary.  - Pt has been " stable from this standpoint throughout hospitalization, hasn't required restarting of trazodone or escalation in valproic acid thus far.     Parkinson's disease  Assessment & Plan  History of mood disturbances, previously on neuroleptics. Tardive dyskinesia on exam.    Sinemet  qid. Can defer changes to outpatient neurology.     Incontinence  Assessment & Plan  Continue oxybutynin 5 mg TID    Cerebral palsy (HCC)  Assessment & Plan  Continue baclofen 10 mg TID    UTI (urinary tract infection)-resolved as of 9/9/2024  Assessment & Plan  Previous culture 8/20-grew E. coli, pansensitive. CT abdomen pelvis demonstrated wall thickening of urinary bladder. Concurrent aspiration pneumonia. Pt completed course of IV abx at that time. Repeat UA unremarkable 9/3.          VTE Pharmacologic Prophylaxis: VTE Score: 7 High Risk (Score >/= 5) - Pharmacological DVT Prophylaxis Ordered: enoxaparin (Lovenox). Sequential Compression Devices Ordered.    Mobility:   Basic Mobility Inpatient Raw Score: 6  JH-HLM Goal: 2: Bed activities/Dependent transfer  JH-HLM Achieved: 2: Bed activities/Dependent transfer  JH-HLM Goal achieved. Continue to encourage appropriate mobility.    Patient Centered Rounds: I performed bedside rounds with nursing staff today.  Discussions with Specialists or Other Care Team Provider:       Education and Discussions with Family / Patient: Updated  (caregiver at facility) via telephone.    Current Length of Stay: 16 day(s)  Current Patient Status: Inpatient   Discharge Plan: TBD. Pt at baseline to be discharged back to group home; however group home is in the process of training their staff for insulin administration. In the interim, referrals to SNF have been sent; awaiting updates per .     Code Status: Level 1 - Full Code    Subjective:   Pt seen and examined at bedside today. She is seen resting in bed, awake, observing her surrounding. She does not appear to be in any acute distress.  Physical examination continues to be at baseline.     Objective:     Vitals:   Temp (24hrs), Av.1 °F (37.3 °C), Min:98.3 °F (36.8 °C), Max:99.9 °F (37.7 °C)    Temp:  [98.3 °F (36.8 °C)-99.9 °F (37.7 °C)] 99.9 °F (37.7 °C)  HR:  [69-76] 73  Resp:  [18] 18  BP: (100-122)/(59-75) 122/70  SpO2:  [93 %-96 %] 94 %  Body mass index is 19.77 kg/m².     Input and Output Summary (last 24 hours):     Intake/Output Summary (Last 24 hours) at 2024 1406  Last data filed at 2024 0800  Gross per 24 hour   Intake 1630 ml   Output 810 ml   Net 820 ml       Physical Exam:   Physical Exam  Vitals reviewed.   Constitutional:       General: She is awake. She is not in acute distress.  HENT:      Right Ear: External ear normal.      Left Ear: External ear normal.      Mouth/Throat:      Comments: Lip smacking (chronic)  Cardiovascular:      Rate and Rhythm: Normal rate and regular rhythm.   Pulmonary:      Effort: No tachypnea or respiratory distress.      Breath sounds: No stridor.   Abdominal:      General: Bowel sounds are normal.      Tenderness: There is no abdominal tenderness. There is no guarding.      Comments: G-tube in place   Musculoskeletal:         General: Deformity present. No swelling or tenderness.   Skin:     General: Skin is warm.      Capillary Refill: Capillary refill takes less than 2 seconds.      Coloration: Skin is not jaundiced.      Findings: No erythema or rash.   Neurological:      Mental Status: She is alert. Mental status is at baseline.         Additional Data:     Labs:  Results from last 7 days   Lab Units 24  0536 24  0543 24  0440   WBC Thousand/uL 6.18   < > 5.61   HEMOGLOBIN g/dL 9.7*   < > 8.7*   HEMATOCRIT % 29.9*   < > 27.5*   PLATELETS Thousands/uL 213   < > 186   SEGS PCT %  --   --  41*   LYMPHO PCT %  --   --  38   MONO PCT %  --   --  9   EOS PCT %  --   --  11*    < > = values in this interval not displayed.     Results from last 7 days   Lab Units  09/09/24  0536 09/06/24  0543 09/05/24  0440   SODIUM mmol/L 135   < > 139   POTASSIUM mmol/L 4.8   < > 4.7   CHLORIDE mmol/L 94*   < > 101   CO2 mmol/L 34*   < > 35*   BUN mg/dL 34*   < > 24   CREATININE mg/dL 1.17   < > 0.88   ANION GAP mmol/L 7   < > 3*   CALCIUM mg/dL 10.3*   < > 9.4   ALBUMIN g/dL  --   --  3.1*   TOTAL BILIRUBIN mg/dL  --   --  0.18*   ALK PHOS U/L  --   --  43   ALT U/L  --   --  8   AST U/L  --   --  18   GLUCOSE RANDOM mg/dL 237*   < > 127    < > = values in this interval not displayed.         Results from last 7 days   Lab Units 09/09/24  1116 09/09/24  0725 09/09/24  0004 09/08/24  2115 09/08/24  1800 09/08/24  1209 09/08/24  0724 09/08/24  0601 09/08/24  0410 09/07/24  2342 09/07/24  1746 09/07/24  1707   POC GLUCOSE mg/dl 202* 248* 254* 153* 75 225* 203* 203* 175* 55* 149* 60*         Results from last 7 days   Lab Units 09/05/24  0440 09/03/24  0323   LACTIC ACID mmol/L 1.0 1.8   PROCALCITONIN ng/ml 0.09  --        Lines/Drains:  Invasive Devices       Peripheral Intravenous Line  Duration             Peripheral IV 09/07/24 Dorsal (posterior);Right Forearm 2 days              Drain  Duration             Gastrostomy/Enterostomy  16 Fr. LUQ 13 days                      Imaging: Reviewed radiology reports from this admission including: chest xray and abdominal/pelvic CT    Recent Cultures (last 7 days):   Results from last 7 days   Lab Units 09/03/24  0324   BLOOD CULTURE  No Growth After 5 Days.  No Growth After 5 Days.       Last 24 Hours Medication List:   Current Facility-Administered Medications   Medication Dose Route Frequency Provider Last Rate    acetaminophen  15 mg/kg Intravenous Q8H PRN [place]  mg (09/03/24 0038)    ammonium lactate   Topical BID PRN Lisa Carty MD      ARIPiprazole  2 mg Per G Tube HS Lisa Carty MD      artificial tear   Both Eyes HS PRN Lisa Carty MD      vitamin C  1,000 mg Per G Tube Daily Lisa Carty MD      baclofen  10 mg Per G Tube TID  Lisa Carty MD      calcium carbonate  750 mg Oral BID Lisa Carty MD      carbamide peroxide  2 drop Both Ears BID PRN Lisa Carty MD      carbidopa-levodopa  1 tablet Oral 4x Daily Lisa Carty MD      cholecalciferol  1,000 Units Per G Tube Daily Lisa Carty MD      citalopram  40 mg Per G Tube Daily Lisa Carty MD      dextromethorphan-guaiFENesin  10 mL Per G Tube Q4H PRN Lisa Carty MD      docosanol   Topical 5x Daily Lisa Carty MD      enoxaparin  40 mg Subcutaneous Daily Lisa Carty MD      Ferrous Sulfate  300 mg Oral BID AC Lisa Carty MD      fluticasone  1 spray Nasal Daily PRN Lisa Carty MD      hydrocortisone   Topical 4x Daily PRN Lisa Carty MD      insulin lispro  1-6 Units Subcutaneous Q6H ROSEMAYR Lisa Carty MD      insulin NPH  20 Units Subcutaneous HS Silvana Mike DO      [START ON 9/10/2024] insulin NPH  5 Units Subcutaneous Daily Before Breakfast Silvana Mike DO      Ketotifen Fumarate  1 drop Both Eyes Daily PRN Lisa Carty MD      levothyroxine  25 mcg Per G Tube QAM Lisa Carty MD      Loratadine  10 mg Oral Daily PRN Lisa Carty MD      LORazepam  0.5 mg Per G Tube HS Sonya Young MD      methenamine hippurate  1 g Oral BID Lisa Carty MD      midodrine  5 mg Oral TID AC Silvana Mike DO      omeprazole (PRILOSEC) suspension 2 mg/mL  20 mg Oral Daily Lisa Carty MD      oxybutynin  5 mg Per G Tube TID Lisa Carty MD      polyethylene glycol  17 g Oral Daily Lisa Carty MD      pravastatin  40 mg Per G Tube Daily With Dinner Lisa Carty MD      valproic acid  125 mg Oral Q12H [facility] Silvana Mike DO          Today, Patient Was Seen By: Silvana Mike DO  PGY-1    **Please Note: This note may have been constructed using a voice recognition system.**

## 2024-09-09 NOTE — UTILIZATION REVIEW
Continued Stay Review    Date: 9/8/24                           Current Patient Class: Inpatient  Current Level of Care: MS    HPI:64 y.o. female initially admitted on 8/24/24 - DX:  Aspiration pneumonia / DM (diabetes mellitus) /     Assessment/Plan:   9/8/24:  She is awake and responds to verbal stimuli. No changes in physical examination. Pt was doing well on 25u Lantus however became hypoglycemic; Lantus decreased to 18u on 9/6, then to 15u on 9/7. Continue Lantus 15 units qHS - if pt becomes hypoglycemic in the evening, can consider switching to 15 units of NPH  Plan: cont iv abx; monitor labs; Accuchecks with ssic; adjusting insulin        Vital Signs (last 3 days)       Date/Time Temp Pulse Resp BP MAP (mmHg) SpO2 Calculated FIO2 (%) - Nasal Cannula Nasal Cannula O2 Flow Rate (L/min) O2 Device Patient Position - Orthostatic VS [place] Coma Scale Score Pain    09/09/24 07:19:55 99.9 °F (37.7 °C) 73 18 122/70 87 94 % -- -- None (Room air) Lying -- --    09/08/24 21:10:32 -- 76 -- 112/60 77 93 % -- -- -- -- -- --    09/08/24 2015 -- -- -- -- -- 94 % -- -- None (Room air) -- 9 No Pain    09/08/24 17:59:15 98.3 °F (36.8 °C) 69 -- 110/75 87 96 % -- -- None (Room air) Lying -- --    09/08/24 15:07:18 -- 69 -- 100/59 73 95 % -- -- -- -- -- --    09/08/24 13:08:16 -- 65 -- 103/58 73 93 % -- -- -- -- -- --    09/08/24 10:59:48 99 °F (37.2 °C) 73 -- 120/73 89 94 % -- -- -- -- -- --    09/08/24 0920 -- -- -- -- -- -- -- -- -- -- 9 No Pain    09/08/24 07:18:51 99.5 °F (37.5 °C) 68 18 108/70 83 99 % 28 2 L/min Nasal cannula Lying -- --    09/07/24 21:36:25 98.3 °F (36.8 °C) 62 16 97/57 70 94 % -- -- -- -- -- --    09/07/24 1945 -- -- -- -- -- 99 % -- -- None (Room air) -- 9 No Pain    09/07/24 19:11:34 98.2 °F (36.8 °C) 68 16 112/72 85 100 % -- -- -- -- -- --    09/07/24 15:18:19 99.9 °F (37.7 °C) 69 18 119/66 84 91 % -- -- -- -- -- --    09/07/24 0800 -- -- -- -- -- -- -- -- None (Room air) -- 9 No Pain    09/07/24  07:51:59 98.7 °F (37.1 °C) 55 -- 116/62 80 98 % -- -- -- -- -- --    09/07/24 05:41:14 -- 66 -- 113/63 80 96 % -- -- -- -- -- --    09/06/24 2100 -- -- -- -- -- 94 % -- -- None (Room air) -- 9 No Pain    09/06/24 20:29:55 99.5 °F (37.5 °C) 73 -- 106/54 71 95 % -- -- -- -- -- --    09/06/24 20:27:13 99.5 °F (37.5 °C) 77 -- 106/54 71 95 % -- -- -- -- -- --    09/06/24 15:26:32 98.6 °F (37 °C) 62 16 112/76 -- 95 % -- -- -- Lying -- --    09/06/24 13:37:28 98.5 °F (36.9 °C) 55 -- -- -- 94 % -- -- -- -- -- --    09/06/24 09:03:11 -- -- -- 105/71 82 -- -- -- -- -- -- --    09/06/24 0900 -- -- -- -- -- -- -- -- -- -- 9 No Pain    09/06/24 08:59:10 -- -- -- 97/53 68 -- -- -- -- -- -- --    09/06/24 0757 99.5 °F (37.5 °C) 62 16 102/54 -- 96 % -- -- None (Room air) Lying -- --    09/06/24 05:39:18 100.7 °F (38.2 °C) 79 14 102/50 -- 92 % -- -- None (Room air) Lying -- --    09/06/24 0100 99.1 °F (37.3 °C) 75 14 104/60 -- 94 % -- -- None (Room air) Lying -- --          Weight (last 2 days)       Date/Time Weight    09/09/24 0600 42.9 (94.58)    09/08/24 0552 44.3 (97.66)    09/07/24 0600 45.6 (100.53)    09/07/24 0541 45.6 (100.53)              Pertinent Labs/Diagnostic Results:   Radiology:  CT chest abdomen pelvis w contrast   Final Interpretation by Aron Ugalde MD (09/04 0846)      1. New opacification of the right lower lobe bronchus with sequela of aspiration in the right lower lobe.      2. No acute findings in the abdomen and pelvis.      3. There is an indeterminate 1.0 cm left renal interpolar lesion, for which dedicated renal sonogram is recommended on a nonemergent basis if not previously performed.      4. Multiple indeterminate nodular densities in the right breast, possibly reflecting sequela of trauma/fat necrosis. Recommend correlation with clinical history and consideration for dedicated breast imaging, as clinically appropriate.      5. Postsurgical sequela related to gastrostomy tube placement.       The study was marked in EPIC for immediate notification.               Workstation performed: [id number]         XR chest portable   Final Interpretation by Carlos Espinoza MD (09/03 0635)      No acute cardiopulmonary disease.            Workstation performed: [id number]         CT abdomen pelvis wo contrast   Final Interpretation by Sherine Gibbons MD (08/30 0710)      Findings compatible with open PEG tube placement. No definite perforation identified.      Persistent findings of cystitis.      Increased dependent lung opacities, usually atelectasis, but cannot exclude consolidation.            Workstation performed: [id number]         XR abdomen 1 view kub   Final Interpretation by Kp Gayle MD (08/30 0948)      Nonobstructive bowel gas pattern. Large volume of stool suggestive of constipation.               Workstation performed: [id number]         XR chest portable ICU   Final Interpretation by Kp Gayle MD (08/30 0947)      No acute cardiopulmonary disease.            Workstation performed: [id number]         CT soft tissue neck with contrast   Final Interpretation by Prudencio Rowan MD (08/24 1531)      No acute finding in the neck by CT. See additional findings above.            Workstation performed: [id number]         CT chest abdomen pelvis w contrast   Final Interpretation by Prudencio Rowan MD (08/24 1524)      Cystitis with wall thickening the urinary bladder and mucosal enhancement. No findings of upper urinary tract infection.      Distention of the colon probably representing chronic colonic pseudoobstruction or adynamic colonic ileus.      Decubitus pressure change in the subcutaneous tissues along the bilateral buttocks.      Increasing dependent density in the lungs probably representing increasing atelectasis and possible superimposed aspiration. Early aspiration pneumonia is not excluded.      Layering secretions in the trachea      Focus of  hyperenhancement along the anterior lower uterine segment. Differential diagnosis would include a cervical mass or fibroid. Correlation with pelvic ultrasound or MRI may be considered.      The study was marked in EPIC for immediate notification.      Workstation performed: [id number]         XR chest 1 view portable   ED Interpretation by Jamin Castro DO (08/24 1002)   No infiltrate now apparent      Final Interpretation by Ezra Kaur MD (08/24 1216)      Clear lungs with interval resolution of the right basilar airspace opacity.            Workstation performed: [id number]           Cardiology:  ECG 12 lead   Final Result by Anderson Gasca MD (08/30 0921)   Normal sinus rhythm   Rightward axis   Nonspecific ST abnormality   Abnormal ECG   When compared with ECG of 20-AUG-2024 12:24,   Vent. rate has decreased BY  44 BPM   Confirmed by Anderson Gasca ([id number]) on 8/30/2024 9:21:05 AM          Results from last 7 days   Lab Units 09/03/24  1143   SARS-COV-2  Negative     Results from last 7 days   Lab Units 09/09/24  0536 09/07/24  0558 09/06/24  0543 09/05/24  0440 09/04/24  0508 09/03/24  0323   WBC Thousand/uL 6.18 5.23 6.26 5.61 6.63 8.54   HEMOGLOBIN g/dL 9.7* 9.3* 9.2* 8.7* 8.5* 10.8*   HEMATOCRIT % 29.9* 29.5* 28.6* 27.5* 26.7* 33.3*   PLATELETS Thousands/uL 213 200 206 186 188 255   TOTAL NEUT ABS Thousands/µL  --   --   --  2.27 2.85 4.96        Results from last 7 days   Lab Units 09/09/24  0536 09/07/24  0558 09/06/24  0543 09/05/24  0440 09/04/24  0508   SODIUM mmol/L 135 140 138 139 136   POTASSIUM mmol/L 4.8 5.1 4.7 4.7 4.9   CHLORIDE mmol/L 94* 100 99 101 100   CO2 mmol/L 34* 36* 32 35* 31   ANION GAP mmol/L 7 4 7 3* 5   BUN mg/dL 34* 27* 28* 24 24   CREATININE mg/dL 1.17 0.97 0.88 0.88 0.87   EGFR ml/min/1.73sq m 50 62 70 70 71   CALCIUM mg/dL 10.3* 10.4* 10.1 9.4 8.7     Results from last 7 days   Lab Units 09/05/24  0440   AST U/L 18   ALT U/L 8   ALK PHOS U/L 43   TOTAL PROTEIN g/dL 5.7*   ALBUMIN  g/dL 3.1*   TOTAL BILIRUBIN mg/dL 0.18*     Results from last 7 days   Lab Units 09/09/24  0725 09/09/24  0004 09/08/24  2115 09/08/24  1800 09/08/24  1209 09/08/24  0724 09/08/24  0601 09/08/24  0410 09/07/24  2342 09/07/24  1746 09/07/24  1707 09/07/24  1054   POC GLUCOSE mg/dl 248* 254* 153* 75 225* 203* 203* 175* 55* 149* 60* 90     Results from last 7 days   Lab Units 09/09/24  0536 09/07/24  0558 09/06/24  0543 09/05/24  0440 09/04/24  0508 09/03/24  0324   GLUCOSE RANDOM mg/dL 237* 133 175* 127 309* 172*        Results from last 7 days   Lab Units 09/05/24  0440   PROCALCITONIN ng/ml 0.09     Results from last 7 days   Lab Units 09/05/24  0440 09/03/24  0323   LACTIC ACID mmol/L 1.0 1.8        Results from last 7 days   Lab Units 09/03/24  1249   CLARITY UA  Clear   COLOR UA  Light Yellow   SPEC GRAV UA  1.012   PH UA  7.5   GLUCOSE UA mg/dl Negative   KETONES UA mg/dl Negative   BLOOD UA  Negative   PROTEIN UA mg/dl Negative   NITRITE UA  Negative   BILIRUBIN UA  Negative   UROBILINOGEN UA (BE) mg/dl <2.0   LEUKOCYTES UA  Negative     Results from last 7 days   Lab Units 09/03/24  1143   INFLUENZA A PCR  Negative   INFLUENZA B PCR  Negative   RSV PCR  Negative        Results from last 7 days   Lab Units 09/03/24  0324   BLOOD CULTURE  No Growth After 5 Days.  No Growth After 5 Days.          Medications:   Scheduled Medications:  ARIPiprazole, 2 mg, Per G Tube, HS  vitamin C, 1,000 mg, Per G Tube, Daily  baclofen, 10 mg, Per G Tube, TID  calcium carbonate, 750 mg, Oral, BID  carbidopa-levodopa, 1 tablet, Oral, 4x Daily  cefTRIAXone, 1,000 mg, Intravenous, Q24H  cholecalciferol, 1,000 Units, Per G Tube, Daily  citalopram, 40 mg, Per G Tube, Daily  docosanol, , Topical, 5x Daily  enoxaparin, 40 mg, Subcutaneous, Daily  Ferrous Sulfate, 300 mg, Oral, BID AC  insulin lispro, 1-6 Units, Subcutaneous, Q6H RO[place]  insulin NPH, 15 Units, Subcutaneous, HS  levothyroxine, 25 mcg, Per G Tube, QAM  LORazepam, 0.5 mg, Per  G Tube, HS  methenamine hippurate, 1 g, Oral, BID  midodrine, 5 mg, Oral, TID AC  omeprazole (PRILOSEC) suspension 2 mg/mL, 20 mg, Oral, Daily  oxybutynin, 5 mg, Per G Tube, TID  polyethylene glycol, 17 g, Oral, Daily  pravastatin, 40 mg, Per G Tube, Daily With Dinner  valproic acid, 125 mg, Oral, Q12H [place]SEMARY      Continuous IV Infusions: None       PRN Meds:  acetaminophen, 15 mg/kg, Intravenous, Q8H PRN  ammonium lactate, , Topical, BID PRN  artificial tear, , Both Eyes, HS PRN  carbamide peroxide, 2 drop, Both Ears, BID PRN  dextromethorphan-guaiFENesin, 10 mL, Per G Tube, Q4H PRN  fluticasone, 1 spray, Nasal, Daily PRN  hydrocortisone, , Topical, 4x Daily PRN  Ketotifen Fumarate, 1 drop, Both Eyes, Daily PRN  Loratadine, 10 mg, Oral, Daily PRN        Discharge Plan: D    Network Utilization Review Department  ATTENTION: Please call with any questions or concerns to [phone] and carefully listen to the prompts so that you are directed to the right person. All voicemails are confidential.   For Discharge needs, contact Care Management DC Support Team at [phone] opt. [phone]  Send all requests for admission clinical reviews, approved or denied determinations and any other requests to dedicated fax number below belonging to the [facility] where the patient is receiving treatment. List of dedicated fax numbers for the Facilities:  FACILITY NAME UR FAX NUMBER   ADMISSION DENIALS (Administrative/Medical Necessity) [phone]   DISCHARGE SUPPORT TEAM (NETWORK) [phone]   PARENT CHILD HEALTH (Maternity/NICU/Pediatrics) [phone]   [facility] [phone]   General acute hospital [phone]   [facility] [phone]   [facility] [phone]   [facility] [phone]   [facility] [phone]   [facility] [phone]   [facility]  [facility] [phone]   [facility] [phone]   Critical access hospital [phone]   [facility] [phone]   [facility] [phone]

## 2024-09-09 NOTE — CASE MANAGEMENT
Case Management Discharge Planning Note    Patient name Terrie Alicea  Location S /S -01 MRN [id number]  : 1960 Date 2024       Current Admission Date: 2024  Current Admission Diagnosis:Dysphagia   Patient Active Problem List    Diagnosis Date Noted Date Diagnosed    Aspiration pneumonia (HCC) 2024     Mood disturbances 2024     Severe protein-calorie malnutrition (HCC) 2024     Functional quadriplegia (Formerly [facility]) 2024     Protein-calorie malnutrition, unspecified severity (Formerly [facility]) 2024     Hospital discharge follow-up 2024     SIRS (systemic inflammatory response syndrome) (Formerly [facility]) 2024     Developmental delay      Preop examination 2023     Cataract 2023     Internal hemorrhoids 2023     Contracture of right hand 2023     Need for shingles vaccine 2023     Viral illness 2022     Dystonic cerebral palsy (HCC) 2022     Cervical dystonia 2022     Parkinson's disease 2022     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Formerly [facility]) 10/18/2021     Polyneuropathy associated with underlying disease ([facility]ly [facility]) 10/18/2021     Hypercalcemia 2021     Herpes zoster without complication 2021     Abnormal finding on lung imaging 2021     Anemia 2020     Eosinophilic leukocytosis 2020     Underweight 2020     Dysphagia 2020     History of vitamin D deficiency 2020     History of fall 10/28/2019     Breast asymmetry 2019     Hypernatremia 2018     Severe sepsis ([facility]) 2018     Gait disturbance 2018     Osteoarthritis of both hips 2018     Severe Intellectual disability 2018     DM (diabetes mellitus) ([facility]) 2018     Abnormal albumin 2017     Peripheral neuropathy 2017     Seasonal allergies 2017     Physical deconditioning 2017     Hyperlipidemia 2017     Gastroesophageal reflux disease 2017     Cerebral palsy ([facility]) 2017      Spasticity 03/27/2017     Ambulatory dysfunction 03/27/2017     UTI (urinary tract infection) 03/27/2017     Bipolar disorder (HCC) 03/27/2017     Hypotension 04/11/2016     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/2016     Weight loss 01/21/2016     Osteoporosis 01/19/2016     Resting tremor 01/19/2016     Acute metabolic encephalopathy 12/11/2015     Gait abnormality 12/11/2015     Platelets decreased (HCC) 07/31/2015     Other chronic pain 07/30/2015     Postmenopausal vaginal bleeding 07/23/2015     Anxiety 07/15/2015     Menopause 09/25/2014     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/28/2014     Atopic dermatitis 06/02/2014     Dry eye 06/02/2014     Constipation 04/12/2013     Iron deficiency anemia 03/27/2013     Incontinence 03/27/2013     Hypothyroidism 01/10/2013       LOS (days): 16  Geometric Mean LOS (GMLOS) (days):   Days to GMLOS:     OBJECTIVE:  Risk of Unplanned Readmission Score: 39.66         Current admission status: Inpatient   Preferred Pharmacy:   [facility] [place] - [address]  [address]  [address]  [facility] [address]  Phone: [phone] Fax: [phone]    [facility]  [address] [facility]  [place] [address]  Phone: [phone] Fax: [phone]    [facility] [place] - [address]  [address] [facility] [address]  Phone: [phone] Fax: [phone]    [facility] [place] [address]  [address]  [facility] [address]  Phone: [phone] Fax: [phone]    Primary Care Provider: Gunnar Sylvester DO    Primary Insurance: [employer]  REP  Secondary Insurance: [place] MED[employer]    DISCHARGE DETAILS:      CM spoke with Lisa, [facility]  who states that she has not solidified a training date for group home staff to be trained on the Pts insulin regimen.  Lisa is to call this CM as soon as  the group home has a plan in place.  SNF referrals in [place]. Pt is awaiting a LOC from [facility] before she can transfer to a SNF.

## 2024-09-10 PROBLEM — J69.0 ASPIRATION PNEUMONIA (HCC): Status: RESOLVED | Noted: 2024-09-03 | Resolved: 2024-09-10

## 2024-09-10 LAB
ANION GAP SERPL CALCULATED.3IONS-SCNC: 5 MMOL/L (ref 4–13)
BUN SERPL-MCNC: 33 MG/DL (ref 5–25)
CALCIUM SERPL-MCNC: 10.8 MG/DL (ref 8.4–10.2)
CHLORIDE SERPL-SCNC: 97 MMOL/L (ref 96–108)
CO2 SERPL-SCNC: 38 MMOL/L (ref 21–32)
CREAT SERPL-MCNC: 1.06 MG/DL (ref 0.6–1.3)
ERYTHROCYTE [DISTWIDTH] IN BLOOD BY AUTOMATED COUNT: 13.7 % (ref 11.6–15.1)
GFR SERPL CREATININE-BSD FRML MDRD: 56 ML/MIN/1.73SQ M
GLUCOSE SERPL-MCNC: 109 MG/DL (ref 65–140)
GLUCOSE SERPL-MCNC: 161 MG/DL (ref 65–140)
GLUCOSE SERPL-MCNC: 165 MG/DL (ref 65–140)
GLUCOSE SERPL-MCNC: 177 MG/DL (ref 65–140)
GLUCOSE SERPL-MCNC: 187 MG/DL (ref 65–140)
GLUCOSE SERPL-MCNC: 217 MG/DL (ref 65–140)
GLUCOSE SERPL-MCNC: 222 MG/DL (ref 65–140)
GLUCOSE SERPL-MCNC: 57 MG/DL (ref 65–140)
GLUCOSE SERPL-MCNC: 58 MG/DL (ref 65–140)
HCT VFR BLD AUTO: 29.1 % (ref 34.8–46.1)
HGB BLD-MCNC: 9.3 G/DL (ref 11.5–15.4)
MCH RBC QN AUTO: 32.9 PG (ref 26.8–34.3)
MCHC RBC AUTO-ENTMCNC: 32 G/DL (ref 31.4–37.4)
MCV RBC AUTO: 103 FL (ref 82–98)
PLATELET # BLD AUTO: 202 THOUSANDS/UL (ref 149–390)
PMV BLD AUTO: 12.9 FL (ref 8.9–12.7)
POTASSIUM SERPL-SCNC: 5 MMOL/L (ref 3.5–5.3)
RBC # BLD AUTO: 2.83 MILLION/UL (ref 3.81–5.12)
SODIUM SERPL-SCNC: 140 MMOL/L (ref 135–147)
WBC # BLD AUTO: 5.98 THOUSAND/UL (ref 4.31–10.16)

## 2024-09-10 PROCEDURE — 97110 THERAPEUTIC EXERCISES: CPT

## 2024-09-10 PROCEDURE — 99232 SBSQ HOSP IP/OBS MODERATE 35: CPT | Performed by: HOSPITALIST

## 2024-09-10 PROCEDURE — 97530 THERAPEUTIC ACTIVITIES: CPT

## 2024-09-10 PROCEDURE — 85027 COMPLETE CBC AUTOMATED: CPT

## 2024-09-10 PROCEDURE — 82948 REAGENT STRIP/BLOOD GLUCOSE: CPT

## 2024-09-10 PROCEDURE — 80048 BASIC METABOLIC PNL TOTAL CA: CPT

## 2024-09-10 RX ORDER — DEXTROSE MONOHYDRATE 25 G/50ML
INJECTION, SOLUTION INTRAVENOUS
Status: COMPLETED
Start: 2024-09-10 | End: 2024-09-10

## 2024-09-10 RX ADMIN — BACLOFEN 10 MG: 10 TABLET ORAL at 15:51

## 2024-09-10 RX ADMIN — ARIPIPRAZOLE 2 MG: 2 TABLET ORAL at 21:51

## 2024-09-10 RX ADMIN — OXYBUTYNIN CHLORIDE 5 MG: 5 TABLET ORAL at 09:52

## 2024-09-10 RX ADMIN — POLYETHYLENE GLYCOL 3350 17 G: 17 POWDER, FOR SOLUTION ORAL at 09:57

## 2024-09-10 RX ADMIN — BACLOFEN 10 MG: 10 TABLET ORAL at 09:40

## 2024-09-10 RX ADMIN — CALCIUM CARBONATE (ANTACID) CHEW TAB 500 MG 750 MG: 500 CHEW TAB at 09:39

## 2024-09-10 RX ADMIN — INSULIN HUMAN 20 UNITS: 100 INJECTION, SUSPENSION SUBCUTANEOUS at 18:43

## 2024-09-10 RX ADMIN — Medication 20 MG: at 09:49

## 2024-09-10 RX ADMIN — OXYCODONE HYDROCHLORIDE AND ACETAMINOPHEN 1000 MG: 500 TABLET ORAL at 09:40

## 2024-09-10 RX ADMIN — Medication 1000 UNITS: at 09:40

## 2024-09-10 RX ADMIN — OXYBUTYNIN CHLORIDE 5 MG: 5 TABLET ORAL at 15:51

## 2024-09-10 RX ADMIN — CARBIDOPA AND LEVODOPA 1 TABLET: 25; 100 TABLET ORAL at 09:39

## 2024-09-10 RX ADMIN — INSULIN HUMAN 5 UNITS: 100 INJECTION, SUSPENSION SUBCUTANEOUS at 09:26

## 2024-09-10 RX ADMIN — BACLOFEN 10 MG: 10 TABLET ORAL at 21:50

## 2024-09-10 RX ADMIN — CARBIDOPA AND LEVODOPA 1 TABLET: 25; 100 TABLET ORAL at 21:50

## 2024-09-10 RX ADMIN — DOCOSANOL: 100 CREAM TOPICAL at 18:27

## 2024-09-10 RX ADMIN — CALCIUM CARBONATE (ANTACID) CHEW TAB 500 MG 750 MG: 500 CHEW TAB at 21:50

## 2024-09-10 RX ADMIN — DOCOSANOL: 100 CREAM TOPICAL at 12:28

## 2024-09-10 RX ADMIN — MIDODRINE HYDROCHLORIDE 5 MG: 5 TABLET ORAL at 12:27

## 2024-09-10 RX ADMIN — VALPROIC ACID 125 MG: 250 SOLUTION ORAL at 09:52

## 2024-09-10 RX ADMIN — FERROUS SULFATE 300 MG: 300 SOLUTION ORAL at 15:51

## 2024-09-10 RX ADMIN — DOCOSANOL: 100 CREAM TOPICAL at 15:50

## 2024-09-10 RX ADMIN — MIDODRINE HYDROCHLORIDE 5 MG: 5 TABLET ORAL at 06:00

## 2024-09-10 RX ADMIN — MIDODRINE HYDROCHLORIDE 5 MG: 5 TABLET ORAL at 15:51

## 2024-09-10 RX ADMIN — LEVOTHYROXINE SODIUM 25 MCG: 25 TABLET ORAL at 09:39

## 2024-09-10 RX ADMIN — DEXTROSE MONOHYDRATE 50 ML: 25 INJECTION, SOLUTION INTRAVENOUS at 17:30

## 2024-09-10 RX ADMIN — LORAZEPAM 0.5 MG: 0.5 TABLET ORAL at 21:50

## 2024-09-10 RX ADMIN — INSULIN LISPRO 2 UNITS: 100 INJECTION, SOLUTION INTRAVENOUS; SUBCUTANEOUS at 12:28

## 2024-09-10 RX ADMIN — LORATADINE 10 MG: 5 SOLUTION ORAL at 09:49

## 2024-09-10 RX ADMIN — VALPROIC ACID 125 MG: 250 SOLUTION ORAL at 21:50

## 2024-09-10 RX ADMIN — PRAVASTATIN SODIUM 40 MG: 40 TABLET ORAL at 15:51

## 2024-09-10 RX ADMIN — CARBIDOPA AND LEVODOPA 1 TABLET: 25; 100 TABLET ORAL at 18:23

## 2024-09-10 RX ADMIN — INSULIN LISPRO 1 UNITS: 100 INJECTION, SOLUTION INTRAVENOUS; SUBCUTANEOUS at 06:09

## 2024-09-10 RX ADMIN — ENOXAPARIN SODIUM 40 MG: 40 INJECTION SUBCUTANEOUS at 09:57

## 2024-09-10 RX ADMIN — METHENAMINE HIPPURATE 1 G: 1 TABLET ORAL at 09:40

## 2024-09-10 RX ADMIN — FERROUS SULFATE 300 MG: 300 SOLUTION ORAL at 06:00

## 2024-09-10 RX ADMIN — CARBIDOPA AND LEVODOPA 1 TABLET: 25; 100 TABLET ORAL at 12:27

## 2024-09-10 RX ADMIN — CITALOPRAM HYDROBROMIDE 40 MG: 20 TABLET ORAL at 09:40

## 2024-09-10 RX ADMIN — OXYBUTYNIN CHLORIDE 5 MG: 5 TABLET ORAL at 21:50

## 2024-09-10 RX ADMIN — DOCOSANOL: 100 CREAM TOPICAL at 21:51

## 2024-09-10 RX ADMIN — METHENAMINE HIPPURATE 1 G: 1 TABLET ORAL at 18:24

## 2024-09-10 RX ADMIN — DOCOSANOL: 100 CREAM TOPICAL at 06:00

## 2024-09-10 NOTE — PLAN OF CARE
Problem: GASTROINTESTINAL - ADULT  Goal: Minimal or absence of nausea and/or vomiting  Description: INTERVENTIONS:  - Administer IV fluids if ordered to ensure adequate hydration  - Maintain NPO status until nausea and vomiting are resolved  - Nasogastric tube if ordered  - Administer ordered antiemetic medications as needed  - Provide nonpharmacologic comfort measures as appropriate  - Advance diet as tolerated, if ordered  - Consider nutrition services referral to assist patient with adequate nutrition and appropriate food choices  Outcome: Progressing  Goal: Maintains or returns to baseline bowel function  Description: INTERVENTIONS:  - Assess bowel function  - Encourage oral fluids to ensure adequate hydration  - Administer IV fluids if ordered to ensure adequate hydration  - Administer ordered medications as needed  - Encourage mobilization and activity  - Consider nutritional services referral to assist patient with adequate nutrition and appropriate food choices  Outcome: Progressing  Goal: Maintains adequate nutritional intake  Description: INTERVENTIONS:  - Monitor percentage of each meal consumed  - Identify factors contributing to decreased intake, treat as appropriate  - Assist with meals as needed  - Monitor I&O, weight, and lab values if indicated  - Obtain nutrition services referral as needed  Outcome: Progressing  Goal: Oral mucous membranes remain intact  Description: INTERVENTIONS  - Assess oral mucosa and hygiene practices  - Implement preventative oral hygiene regimen  - Implement oral medicated treatments as ordered  - Initiate Nutrition services referral as needed  Outcome: Progressing     Problem: Potential for Falls  Goal: Patient will remain free of falls  Description: INTERVENTIONS:  - Educate patient/family on patient safety including physical limitations  - Instruct patient to call for assistance with activity   - Consult OT/PT to assist with strengthening/mobility   - Keep Call bell  within reach  - Keep bed low and locked with side rails adjusted as appropriate  - Keep care items and personal belongings within reach  - Initiate and maintain comfort rounds  - Make Fall Risk Sign visible to staff  - Offer Toileting every  Hours, in advance of need  - Initiate/Maintain alarm  - Obtain necessary fall risk management equipment:   - Apply yellow socks and bracelet for high fall risk patients  - Consider moving patient to room near nurses station  Outcome: Progressing     Problem: Prexisting or High Potential for Compromised Skin Integrity  Goal: Skin integrity is maintained or improved  Description: INTERVENTIONS:  - Identify patients at risk for skin breakdown  - Assess and monitor skin integrity  - Assess and monitor nutrition and hydration status  - Monitor labs   - Assess for incontinence   - Turn and reposition patient  - Assist with mobility/ambulation  - Relieve pressure over bony prominences  - Avoid friction and shearing  - Provide appropriate hygiene as needed including keeping skin clean and dry  - Evaluate need for skin moisturizer/barrier cream  - Collaborate with interdisciplinary team   - Patient/family teaching  - Consider wound care consult   Outcome: Progressing     Problem: Nutrition/Hydration-ADULT  Goal: Nutrient/Hydration intake appropriate for improving, restoring or maintaining nutritional needs  Description: Monitor and assess patient's nutrition/hydration status for malnutrition. Collaborate with interdisciplinary team and initiate plan and interventions as ordered.  Monitor patient's weight and dietary intake as ordered or per policy. Utilize nutrition screening tool and intervene as necessary. Determine patient's food preferences and provide high-protein, high-caloric foods as appropriate.     INTERVENTIONS:  - Monitor oral intake, urinary output, labs, and treatment plans  - Assess nutrition and hydration status and recommend course of action  - Evaluate amount of meals  eaten  - Assist patient with eating if necessary   - Allow adequate time for meals  - Recommend/ encourage appropriate diets, oral nutritional supplements, and vitamin/mineral supplements  - Order, calculate, and assess calorie counts as needed  - Recommend, monitor, and adjust tube feedings and TPN/PPN based on assessed needs  - Assess need for intravenous fluids  - Provide specific nutrition/hydration education as appropriate  - Include patient/family/caregiver in decisions related to nutrition  Outcome: Progressing

## 2024-09-10 NOTE — PROGRESS NOTES
"Progress Note - Hospitalist   Name: Terrie Alicea 64 y.o. female I MRN: [id number]  Unit/Bed#: S -01 I Date of Admission: 8/24/2024   Date of Service: 9/10/2024 I Hospital Day: 17    Assessment & Plan  Dysphagia  CT neck no organic obstructions in the mediastinum. Pt has had poor PO intake, malnutrition requiring gastrostomy tube placement on 08/26. Per group home, pt attends day program which cannot manage tube feeds so requesting her to receive it from 6pm to 6am. Settings adjusted to take this into consideration per nutrition.     Plan:  Per Nutrition: \"Adjust tube feed order to: 70ml water before and after running the feeding, 70ml q 2 hours while feeding is running. 1263 mL total fee water daily with formula and flushes. Continue Glucerna 1.2 at 80ml/hr x 12 hours. 960mL glucerna, 1152 kcal daily.\"  Continue Glucerna 1.2 at 80 cc/h x 12h  70 mL water flush before & after TF cycle  70 mL q2h water flushes while feeding is running  1263 mL total free water daily with formula & flushes  Per SLP: can introduce pleasure feeds or puree & HTL as tolerated  DM (diabetes mellitus) (HCC)  Lab Results   Component Value Date    HGBA1C 6.2 (H) 08/13/2024     Recent Labs     09/10/24  1236 09/10/24  1713 09/10/24  1732 09/10/24  1820   POCGLU 161* 57* 58* 222*     Blood Sugar Average: Last 72 hrs:  (P) [id number]    PMHx of diabetes. Pt having hyperglycemic episodes in the setting of tube feeds, possibly related to refeeding syndrome although pt has not had hypokalemia, hypomagnesemia, hypophosphatemia. Required insulin drip on 8/29 as BG>500, discontinued when BG<200. However, pt became hyperglycemic overnight 8/30 in the setting of tube feeds, MAP ~54 w minimal response to fluids and albumin, transferred to ICU. Per review of crit care notes, pt was briefly on Levophed; low suspicion of infectious etiology as pt has not had leukocytosis and has been afebrile, imaging unremarkable. Pt stable, nutrition " switched feed from Jevity 1.5 to Glucerna 1.2, transferred back to med/surg on 8/31. Pt was doing well on 25u Lantus however became hypoglycemic; Lantus decreased to 18u on 9/6, then to 15u on 9/7. On 9/8 switched Lantus 15u to NPH 15u to prevent evening hypoglycemia in the setting of cyclic tube feeds; however pt still hyperglycemic w BG>200 in the AM. 9/9 started NPH 20u at 6PM, and 5u at 6AM, to correspond to tube feed timeline. BGs improved.    - Continue NPH 20 units at 6PM, to be given when initiating tube feeds.  - Continue NPH 5 units at 6AM to be given when tube feeds are complete.   - Continue insulin sliding scale, algorithm 3  - BG checks q6h  - Hypoglycemia protocol in place  Hypotension  Pt noted to be hypotensive intermittently throughout hospitalization. Per caregivers and record review, pt's BP tends to run on softer side. Pt's BP have been appropriate.      - Midodrine 5 mg TID  - Compression stockings  - VS q4h  - fluid intake per Nutrition, see plan under dysphagia  Cerebral palsy (HCC)  Continue baclofen 10 mg TID  Incontinence  Continue oxybutynin 5 mg TID  Parkinson's disease  History of mood disturbances, previously on neuroleptics. Tardive dyskinesia on exam.    Sinemet  qid. Can defer changes to outpatient neurology.   Mood disturbances  - Continue home meds: Abilify 2 mg, Citalopram 40 mg.  - Pt previously on trazodone 100 mg q HS which was discontinued on 8/30 while in the ICU d/t contributions to sedation.    - Continue Depakene oral solution 125 mg q12h for mood, can defer changes per outpatient psychiatry as deemed necessary.  - Pt has been stable from this standpoint throughout hospitalization, hasn't required restarting of trazodone or escalation in valproic acid thus far.   Aspiration pneumonia (HCC) (Resolved: 9/10/2024)  On 9/1 pt noted to be spiking fevers intermittently. Tmax recorded as 102.4F on 9/3. CXR 9/1 showed no acute cardiopulmonary process. Pt is unable to  "participate in incentive spirometry for atelectasis prophylaxis. CT chest, abdomen, pelvis w contrast report 9/3 shows new opacification of \"R lower lobe bronchus, R atelectasis, w sequela of aspiration in R lower lobe.\" Initiated IV Rocephin 1 g on  for aspiration PNA. Pt afebrile, no tachycardia, no tachypnea. Pt completed ABX course. Continues to be afebrile, not tachycardic or tachypneic.  UTI (urinary tract infection) (Resolved: 2024)  Previous culture -grew E. coli, pansensitive. CT abdomen pelvis demonstrated wall thickening of urinary bladder. Concurrent aspiration pneumonia. Pt completed course of IV abx at that time. Repeat UA unremarkable 9/3.      VTE Pharmacologic Prophylaxis: VTE Score: 7 High Risk (Score >/= 5) - Pharmacological DVT Prophylaxis Ordered: enoxaparin (Lovenox). Sequential Compression Devices Ordered.    Mobility:   Basic Mobility Inpatient Raw Score: 6  JH-HLM Goal: 2: Bed activities/Dependent transfer  JH-HLM Achieved: 2: Bed activities/Dependent transfer  JH-HLM Goal achieved. Continue to encourage appropriate mobility.      Education and Discussions with Family / Patient:  Attempted to update group home, via telephone, no answer.     Current Length of Stay: 17 day(s)  Current Patient Status: Inpatient   Discharge Plan:  TBD as pt can be discharged; however group home needs time to train staff to administer insulin and pt is in the process of being evaluated for SNFs.    Code Status: Level 1 - Full Code    Subjective   Pt seen and evaluated at bedside today. She is asleep, although responds to verbal stimuli. Does not appear to be in any acute distress.    Objective     Vitals:   Temp (24hrs), Av.3 °F (36.8 °C), Min:98.1 °F (36.7 °C), Max:98.6 °F (37 °C)    Temp:  [98.1 °F (36.7 °C)-98.6 °F (37 °C)] 98.6 °F (37 °C)  HR:  [51-70] 65  Resp:  [16-18] 18  BP: ()/(55-66) 85/55  SpO2:  [94 %-96 %] 95 %  Body mass index is 19.67 kg/m².     Input and Output Summary (last " 24 hours):     Intake/Output Summary (Last 24 hours) at 9/10/2024 1842  Last data filed at 9/10/2024 1230  Gross per 24 hour   Intake 1750 ml   Output 1076 ml   Net 674 ml       Physical Exam  Vitals reviewed.   Constitutional:       General: She is not in acute distress.     Appearance: She is not toxic-appearing.   HENT:      Head: Normocephalic.      Mouth/Throat:      Pharynx: No oropharyngeal exudate.      Comments: Lip smacking present (chronic)  Eyes:      Extraocular Movements: Extraocular movements intact.      Conjunctiva/sclera: Conjunctivae normal.   Cardiovascular:      Rate and Rhythm: Normal rate and regular rhythm.      Pulses: Normal pulses.   Pulmonary:      Effort: No respiratory distress.      Breath sounds: Normal breath sounds.   Chest:      Chest wall: No tenderness.   Abdominal:      General: Bowel sounds are normal.      Palpations: Abdomen is soft.      Tenderness: There is no guarding.      Comments: G-tube in place   Musculoskeletal:         General: Deformity (chronic) present. No tenderness.   Skin:     Capillary Refill: Capillary refill takes less than 2 seconds.   Neurological:      Mental Status: She is alert. Mental status is at baseline.         Lines/Drains:  Lines/Drains/Airways       Active Status       Name Placement date Placement time Site Days    Gastrostomy/Enterostomy  16 Fr. LUQ 08/26/24  1643  LUQ  15                            Lab Results: I have reviewed the following results:    Results from last 7 days   Lab Units 09/10/24  0512 09/06/24  0543 09/05/24  0440   WBC Thousand/uL 5.98   < > 5.61   HEMOGLOBIN g/dL 9.3*   < > 8.7*   HEMATOCRIT % 29.1*   < > 27.5*   PLATELETS Thousands/uL 202   < > 186   SEGS PCT %  --   --  41*   LYMPHO PCT %  --   --  38   MONO PCT %  --   --  9   EOS PCT %  --   --  11*    < > = values in this interval not displayed.     Results from last 7 days   Lab Units 09/10/24  0512 09/06/24  0543 09/05/24  0440   SODIUM mmol/L 140   < > 139    POTASSIUM mmol/L 5.0   < > 4.7   CHLORIDE mmol/L 97   < > 101   CO2 mmol/L 38*   < > 35*   BUN mg/dL 33*   < > 24   CREATININE mg/dL 1.06   < > 0.88   ANION GAP mmol/L 5   < > 3*   CALCIUM mg/dL 10.8*   < > 9.4   ALBUMIN g/dL  --   --  3.1*   TOTAL BILIRUBIN mg/dL  --   --  0.18*   ALK PHOS U/L  --   --  43   ALT U/L  --   --  8   AST U/L  --   --  18   GLUCOSE RANDOM mg/dL 165*   < > 127    < > = values in this interval not displayed.         Results from last 7 days   Lab Units 09/10/24  1820 09/10/24  1732 09/10/24  1713 09/10/24  1236 09/10/24  1034 09/10/24  0728 09/10/24  0551 09/10/24  0003 09/09/24  1459 09/09/24  1116 09/09/24  0725 09/09/24  0004   POC GLUCOSE mg/dl 222* 58* 57* 161* 217* 177* 187* 109 96 202* 248* 254*         Results from last 7 days   Lab Units 09/05/24  0440   LACTIC ACID mmol/L 1.0   PROCALCITONIN ng/ml 0.09       Recent Cultures (last 7 days):         Imaging Review: Reviewed radiology reports from this admission including: chest xray and CT abdomen/pelvis.    Last 24 Hours Medication List:     Current Facility-Administered Medications:     acetaminophen (Ofirmev) IVPB 630 mg, Q8H PRN, Last Rate: 630 mg (09/03/24 0038)    ammonium lactate (LAC-HYDRIN) 12 % cream, BID PRN    ARIPiprazole (ABILIFY) tablet 2 mg, HS    artificial tear ophthalmic ointment, HS PRN    ascorbic acid (VITAMIN C) tablet 1,000 mg, Daily    baclofen tablet 10 mg, TID    calcium carbonate (TUMS) chewable tablet 750 mg, BID    carbamide peroxide (DEBROX) 6.5 % otic solution 2 drop, BID PRN    carbidopa-levodopa (SINEMET)  mg per tablet 1 tablet, 4x Daily    Cholecalciferol (VITAMIN D3) tablet 1,000 Units, Daily    citalopram (CeleXA) tablet 40 mg, Daily    dextromethorphan-guaiFENesin (ROBITUSSIN DM) oral syrup 10 mL, Q4H PRN    docosanol (ABREVA) 10 % cream, 5x Daily    enoxaparin (LOVENOX) subcutaneous injection 40 mg, Daily    Ferrous Sulfate oral syrup 300 mg, BID AC    fluticasone (FLONASE) 50 mcg/act  nasal spray 1 spray, Daily PRN    hydrocortisone 1 % cream, 4x Daily PRN    insulin lispro (HumALOG/ADMELOG) 100 units/mL subcutaneous injection 1-6 Units, Q6H ROSEMARY **AND** Fingerstick Glucose (POCT), Q6H    insulin NPH (HumuLIN N,NovoLIN N) 100 Units/mL subcutaneous injection 20 Units, HS    insulin NPH (HumuLIN N,NovoLIN N) 100 Units/mL subcutaneous injection 5 Units, Daily Before Breakfast    Ketotifen Fumarate (ZADITOR) 0.035 % ophthalmic solution 1 drop, Daily PRN    levothyroxine tablet 25 mcg, QAM    Loratadine (CLARITIN) oral soln 10 mg, Daily PRN    LORazepam (ATIVAN) tablet 0.5 mg, HS    methenamine hippurate (HIPREX) tablet 1 g, BID    midodrine (PROAMATINE) tablet 5 mg, TID AC    omeprazole (PRILOSEC) suspension 2 mg/mL, Daily    oxybutynin (DITROPAN) tablet 5 mg, TID    polyethylene glycol (MIRALAX) packet 17 g, Daily    pravastatin (PRAVACHOL) tablet 40 mg, Daily With Dinner    valproic acid (DEPAKENE) oral soln 125 mg, Q12H ROSEMARY    Administrative Statements   Today, Patient Was Seen By: Silvana Mike DO  PGY-1      **Please Note: This note may have been constructed using a voice recognition system.**

## 2024-09-10 NOTE — CASE MANAGEMENT
Case Management Discharge Planning Note    Patient name Terrie Alicea  Location S /S -01 MRN [id number]  : 1960 Date 9/10/2024       Current Admission Date: 2024  Current Admission Diagnosis:Dysphagia   Patient Active Problem List    Diagnosis Date Noted Date Diagnosed    Aspiration pneumonia (HCC) 2024     Mood disturbances 2024     Severe protein-calorie malnutrition (HCC) 2024     Functional quadriplegia (Former[facility]) 2024     Protein-calorie malnutrition, unspecified severity ([facility]) 2024     Hospital discharge follow-up 2024     SIRS (systemic inflammatory response syndrome) ([facility]) 2024     Developmental delay      Preop examination 2023     Cataract 2023     Internal hemorrhoids 2023     Contracture of right hand 2023     Need for shingles vaccine 2023     Viral illness 2022     Dystonic cerebral palsy (HCC) 2022     Cervical dystonia 2022     Parkinson's disease 2022     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[facility]) 10/18/2021     Polyneuropathy associated with underlying disease ([facility]) 10/18/2021     Hypercalcemia 2021     Herpes zoster without complication 2021     Abnormal finding on lung imaging 2021     Anemia 2020     Eosinophilic leukocytosis 2020     Underweight 2020     Dysphagia 2020     History of vitamin D deficiency 2020     History of fall 10/28/2019     Breast asymmetry 2019     Hypernatremia 2018     Severe sepsis (HCC) 2018     Gait disturbance 2018     Osteoarthritis of both hips 2018     Severe Intellectual disability 2018     DM (diabetes mellitus) ([facility]) 2018     Abnormal albumin 2017     Peripheral neuropathy 2017     Seasonal allergies 2017     Physical deconditioning 2017     Hyperlipidemia 2017     Gastroesophageal reflux disease 2017     Cerebral palsy ([facility])  03/27/2017     Spasticity 03/27/2017     Ambulatory dysfunction 03/27/2017     Bipolar disorder (HCC) 03/27/2017     Hypotension 04/11/2016     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/2016     Weight loss 01/21/2016     Osteoporosis 01/19/2016     Resting tremor 01/19/2016     Acute metabolic encephalopathy 12/11/2015     Gait abnormality 12/11/2015     Platelets decreased (HCC) 07/31/2015     Other chronic pain 07/30/2015     Postmenopausal vaginal bleeding 07/23/2015     Anxiety 07/15/2015     Menopause 09/25/2014     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/28/2014     Atopic dermatitis 06/02/2014     Dry eye 06/02/2014     Constipation 04/12/2013     Iron deficiency anemia 03/27/2013     Incontinence 03/27/2013     Hypothyroidism 01/10/2013       LOS (days): 17  Geometric Mean LOS (GMLOS) (days):   Days to GMLOS:     OBJECTIVE:  Risk of Unplanned Readmission Score: 40.15         Current admission status: Inpatient   Preferred Pharmacy:   [facility]  [place] - [address]  [address]  [address]  [facility] [address]  Phone: [phone] Fax: [phone]    [facility]  [address]  [place] [address]  Phone: [phone] Fax: [phone]    [facility] [place] - [address]  [address] [facility] [address]  Phone: [phone] Fax: [phone]    [facility] [place] - [address]  [address]  [facility] [address]  Phone: [phone] Fax: [phone]    Primary Care Provider: Gunnar Sylvester DO    Primary Insurance: [employer]  REP  Secondary Insurance: [place] [employer] [employer]    DISCHARGE DETAILS:    TC to Lisa at [facility]. Per Lisa, she still does not have a date from her regional management team as to when the group home staff will be trained on the Pts insulin.     Pt is still waiting for a Level of Care determination from [facility] for possible SNF placement.

## 2024-09-10 NOTE — PLAN OF CARE
Problem: PHYSICAL THERAPY ADULT  Goal: Performs mobility at highest level of function for planned discharge setting.  See evaluation for individualized goals.  Description: Treatment/Interventions: Functional transfer training, Patient/family training, Equipment eval/education, Bed mobility, Spoke to nursing, Spoke to case management, OT  Equipment Recommended: Wheelchair (anticipate pt likely has specialty WC, but no further information available.)       See flowsheet documentation for full assessment, interventions and recommendations.  Outcome: Not Progressing  Note: Prognosis: Guarded  Problem List: Decreased strength, Decreased range of motion, Decreased endurance, Impaired balance, Decreased mobility, Decreased coordination, Decreased cognition, Impaired judgement, Decreased safety awareness, Impaired tone (non verbal)  Assessment: pt began tx session lying supine in the bed prior to PT intervention. Upon arrival to pt room pt with urinary incontinence. pt required cleaning and changing of gown. In comparison to previous tx sessions pt continues to be dependent for all bed mobility and pt required dependent means for all rolling and repositioning in the bed from L to R with LE and trunk management. pt was unable to maintain sidelying position w/o physical assistance. TE/PROM activities were provided to the pt in todays tx session in order to reduce pt hypertonicity, increase pt flexibility and to facilitate decreased assistance for all functional transfers when appropriate. pt was unable to sit EOB static w/o requiring mod to max ax1 for safety and balance. pt would benefit from continued skilled PT intervention in order to increase safety and balance with transfers when appropriate.  Barriers to Discharge: Decreased caregiver support     Rehab Resource Intensity Level, PT: III (Minimum Resource Intensity) (return to facility)    See flowsheet documentation for full assessment.

## 2024-09-11 LAB
ANION GAP SERPL CALCULATED.3IONS-SCNC: 7 MMOL/L (ref 4–13)
ANION GAP SERPL CALCULATED.3IONS-SCNC: 7 MMOL/L (ref 4–13)
BUN SERPL-MCNC: 32 MG/DL (ref 5–25)
BUN SERPL-MCNC: 36 MG/DL (ref 5–25)
CALCIUM SERPL-MCNC: 10.6 MG/DL (ref 8.4–10.2)
CALCIUM SERPL-MCNC: 10.8 MG/DL (ref 8.4–10.2)
CHLORIDE SERPL-SCNC: 97 MMOL/L (ref 96–108)
CHLORIDE SERPL-SCNC: 98 MMOL/L (ref 96–108)
CO2 SERPL-SCNC: 36 MMOL/L (ref 21–32)
CO2 SERPL-SCNC: 38 MMOL/L (ref 21–32)
CREAT SERPL-MCNC: 1.08 MG/DL (ref 0.6–1.3)
CREAT SERPL-MCNC: 1.18 MG/DL (ref 0.6–1.3)
ERYTHROCYTE [DISTWIDTH] IN BLOOD BY AUTOMATED COUNT: 13.9 % (ref 11.6–15.1)
GFR SERPL CREATININE-BSD FRML MDRD: 49 ML/MIN/1.73SQ M
GFR SERPL CREATININE-BSD FRML MDRD: 55 ML/MIN/1.73SQ M
GLUCOSE SERPL-MCNC: 149 MG/DL (ref 65–140)
GLUCOSE SERPL-MCNC: 159 MG/DL (ref 65–140)
GLUCOSE SERPL-MCNC: 169 MG/DL (ref 65–140)
GLUCOSE SERPL-MCNC: 181 MG/DL (ref 65–140)
GLUCOSE SERPL-MCNC: 245 MG/DL (ref 65–140)
GLUCOSE SERPL-MCNC: 45 MG/DL (ref 65–140)
GLUCOSE SERPL-MCNC: 52 MG/DL (ref 65–140)
GLUCOSE SERPL-MCNC: 74 MG/DL (ref 65–140)
GLUCOSE SERPL-MCNC: 83 MG/DL (ref 65–140)
HCT VFR BLD AUTO: 29.4 % (ref 34.8–46.1)
HGB BLD-MCNC: 9.3 G/DL (ref 11.5–15.4)
MCH RBC QN AUTO: 32.9 PG (ref 26.8–34.3)
MCHC RBC AUTO-ENTMCNC: 31.6 G/DL (ref 31.4–37.4)
MCV RBC AUTO: 104 FL (ref 82–98)
PLATELET # BLD AUTO: 197 THOUSANDS/UL (ref 149–390)
PMV BLD AUTO: 12.6 FL (ref 8.9–12.7)
POTASSIUM SERPL-SCNC: 3.8 MMOL/L (ref 3.5–5.3)
POTASSIUM SERPL-SCNC: 5.4 MMOL/L (ref 3.5–5.3)
RBC # BLD AUTO: 2.83 MILLION/UL (ref 3.81–5.12)
SODIUM SERPL-SCNC: 140 MMOL/L (ref 135–147)
SODIUM SERPL-SCNC: 143 MMOL/L (ref 135–147)
WBC # BLD AUTO: 5.42 THOUSAND/UL (ref 4.31–10.16)

## 2024-09-11 PROCEDURE — 99232 SBSQ HOSP IP/OBS MODERATE 35: CPT | Performed by: HOSPITALIST

## 2024-09-11 PROCEDURE — 80048 BASIC METABOLIC PNL TOTAL CA: CPT

## 2024-09-11 PROCEDURE — 82948 REAGENT STRIP/BLOOD GLUCOSE: CPT

## 2024-09-11 PROCEDURE — 85027 COMPLETE CBC AUTOMATED: CPT

## 2024-09-11 RX ORDER — DEXTROSE MONOHYDRATE 25 G/50ML
INJECTION, SOLUTION INTRAVENOUS
Status: COMPLETED
Start: 2024-09-11 | End: 2024-09-11

## 2024-09-11 RX ORDER — DEXTROSE MONOHYDRATE 25 G/50ML
25 INJECTION, SOLUTION INTRAVENOUS ONCE
Status: COMPLETED | OUTPATIENT
Start: 2024-09-11 | End: 2024-09-11

## 2024-09-11 RX ADMIN — CARBIDOPA AND LEVODOPA 1 TABLET: 25; 100 TABLET ORAL at 12:30

## 2024-09-11 RX ADMIN — LEVOTHYROXINE SODIUM 25 MCG: 25 TABLET ORAL at 09:53

## 2024-09-11 RX ADMIN — BACLOFEN 10 MG: 10 TABLET ORAL at 09:53

## 2024-09-11 RX ADMIN — POLYETHYLENE GLYCOL 3350 17 G: 17 POWDER, FOR SOLUTION ORAL at 09:53

## 2024-09-11 RX ADMIN — CARBIDOPA AND LEVODOPA 1 TABLET: 25; 100 TABLET ORAL at 09:55

## 2024-09-11 RX ADMIN — FERROUS SULFATE 300 MG: 300 SOLUTION ORAL at 17:26

## 2024-09-11 RX ADMIN — CALCIUM CARBONATE (ANTACID) CHEW TAB 500 MG 750 MG: 500 CHEW TAB at 22:12

## 2024-09-11 RX ADMIN — DOCOSANOL: 100 CREAM TOPICAL at 12:30

## 2024-09-11 RX ADMIN — CITALOPRAM HYDROBROMIDE 40 MG: 20 TABLET ORAL at 09:54

## 2024-09-11 RX ADMIN — LORAZEPAM 0.5 MG: 0.5 TABLET ORAL at 22:12

## 2024-09-11 RX ADMIN — METHENAMINE HIPPURATE 1 G: 1 TABLET ORAL at 09:55

## 2024-09-11 RX ADMIN — DOCOSANOL: 100 CREAM TOPICAL at 17:27

## 2024-09-11 RX ADMIN — INSULIN LISPRO 1 UNITS: 100 INJECTION, SOLUTION INTRAVENOUS; SUBCUTANEOUS at 06:26

## 2024-09-11 RX ADMIN — Medication 1000 UNITS: at 09:53

## 2024-09-11 RX ADMIN — INSULIN HUMAN 5 UNITS: 100 INJECTION, SUSPENSION SUBCUTANEOUS at 06:26

## 2024-09-11 RX ADMIN — METHENAMINE HIPPURATE 1 G: 1 TABLET ORAL at 17:27

## 2024-09-11 RX ADMIN — DOCOSANOL: 100 CREAM TOPICAL at 15:03

## 2024-09-11 RX ADMIN — ENOXAPARIN SODIUM 40 MG: 40 INJECTION SUBCUTANEOUS at 09:53

## 2024-09-11 RX ADMIN — DEXTROSE MONOHYDRATE 25 ML: 25 INJECTION, SOLUTION INTRAVENOUS at 12:30

## 2024-09-11 RX ADMIN — OXYBUTYNIN CHLORIDE 5 MG: 5 TABLET ORAL at 22:12

## 2024-09-11 RX ADMIN — OXYCODONE HYDROCHLORIDE AND ACETAMINOPHEN 1000 MG: 500 TABLET ORAL at 09:53

## 2024-09-11 RX ADMIN — MIDODRINE HYDROCHLORIDE 5 MG: 5 TABLET ORAL at 17:26

## 2024-09-11 RX ADMIN — DOCOSANOL: 100 CREAM TOPICAL at 22:13

## 2024-09-11 RX ADMIN — BACLOFEN 10 MG: 10 TABLET ORAL at 22:12

## 2024-09-11 RX ADMIN — DEXTROSE MONOHYDRATE 50 ML: 25 INJECTION, SOLUTION INTRAVENOUS at 19:39

## 2024-09-11 RX ADMIN — CALCIUM CARBONATE (ANTACID) CHEW TAB 500 MG 750 MG: 500 CHEW TAB at 09:53

## 2024-09-11 RX ADMIN — VALPROIC ACID 125 MG: 250 SOLUTION ORAL at 09:53

## 2024-09-11 RX ADMIN — OXYBUTYNIN CHLORIDE 5 MG: 5 TABLET ORAL at 09:53

## 2024-09-11 RX ADMIN — PRAVASTATIN SODIUM 40 MG: 40 TABLET ORAL at 17:26

## 2024-09-11 RX ADMIN — DOCOSANOL: 100 CREAM TOPICAL at 06:03

## 2024-09-11 RX ADMIN — ARIPIPRAZOLE 2 MG: 2 TABLET ORAL at 22:14

## 2024-09-11 RX ADMIN — CARBIDOPA AND LEVODOPA 1 TABLET: 25; 100 TABLET ORAL at 17:26

## 2024-09-11 RX ADMIN — OXYBUTYNIN CHLORIDE 5 MG: 5 TABLET ORAL at 17:26

## 2024-09-11 RX ADMIN — INSULIN HUMAN 20 UNITS: 100 INJECTION, SUSPENSION SUBCUTANEOUS at 17:26

## 2024-09-11 RX ADMIN — CARBIDOPA AND LEVODOPA 1 TABLET: 25; 100 TABLET ORAL at 22:11

## 2024-09-11 RX ADMIN — Medication 20 MG: at 09:56

## 2024-09-11 RX ADMIN — BACLOFEN 10 MG: 10 TABLET ORAL at 17:26

## 2024-09-11 RX ADMIN — MIDODRINE HYDROCHLORIDE 5 MG: 5 TABLET ORAL at 06:04

## 2024-09-11 RX ADMIN — FERROUS SULFATE 300 MG: 300 SOLUTION ORAL at 06:03

## 2024-09-11 RX ADMIN — VALPROIC ACID 125 MG: 250 SOLUTION ORAL at 22:11

## 2024-09-11 RX ADMIN — MIDODRINE HYDROCHLORIDE 5 MG: 5 TABLET ORAL at 12:30

## 2024-09-11 NOTE — CASE MANAGEMENT
Case Management Discharge Planning Note    Patient name Terrie Alicea  Location S /S -01 MRN [id number]  : 1960 Date 2024       Current Admission Date: 2024  Current Admission Diagnosis:Dysphagia   Patient Active Problem List    Diagnosis Date Noted Date Diagnosed    Mood disturbances 2024     Severe protein-calorie malnutrition (HCC) 2024     Functional quadriplegia ([facility]) 2024     Protein-calorie malnutrition, unspecified severity ([facility]) 2024     Hospital discharge follow-up 2024     SIRS (systemic inflammatory response syndrome) ([facility]) 2024     Developmental delay      Preop examination 2023     Cataract 2023     Internal hemorrhoids 2023     Contracture of right hand 2023     Need for shingles vaccine 2023     Viral illness 2022     Dystonic cerebral palsy ([facility]) 2022     Cervical dystonia 2022     Parkinson's disease 2022     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[facility]) 10/18/2021     Polyneuropathy associated with underlying disease ([facility]) 10/18/2021     Hypercalcemia 2021     Herpes zoster without complication 2021     Abnormal finding on lung imaging 2021     Anemia 2020     Eosinophilic leukocytosis 2020     Underweight 2020     Dysphagia 2020     History of vitamin D deficiency 2020     History of fall 10/28/2019     Breast asymmetry 2019     Hypernatremia 2018     Severe sepsis ([facility]) 2018     Gait disturbance 2018     Osteoarthritis of both hips 2018     Severe Intellectual disability 2018     DM (diabetes mellitus) ([facility]) 2018     Abnormal albumin 2017     Peripheral neuropathy 2017     Seasonal allergies 2017     Physical deconditioning 2017     Hyperlipidemia 2017     Gastroesophageal reflux disease 2017     Cerebral palsy ([facility]) 2017     Spasticity 2017      Ambulatory dysfunction 03/27/2017     Bipolar disorder (HCC) 03/27/2017     Hypotension 04/11/2016     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/2016     Weight loss 01/21/2016     Osteoporosis 01/19/2016     Resting tremor 01/19/2016     Acute metabolic encephalopathy 12/11/2015     Gait abnormality 12/11/2015     Platelets decreased (HCC) 07/31/2015     Other chronic pain 07/30/2015     Postmenopausal vaginal bleeding 07/23/2015     Anxiety 07/15/2015     Menopause 09/25/2014     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/28/2014     Atopic dermatitis 06/02/2014     Dry eye 06/02/2014     Constipation 04/12/2013     Iron deficiency anemia 03/27/2013     Incontinence 03/27/2013     Hypothyroidism 01/10/2013       LOS (days): 18  Geometric Mean LOS (GMLOS) (days):   Days to GMLOS:     OBJECTIVE:  Risk of Unplanned Readmission Score: 39.8         Current admission status: Inpatient   Preferred Pharmacy:   [facility]  [place] - [address]  [address]  [address]  [facility] [address]  Phone: [phone] Fax: [phone]    [facility]  [address]  [place] [address]  Phone: [phone] Fax: [phone]    [facility] [place], [place] - [address]  [address] [address]  Phone: [phone] Fax: [phone]    [facility] [place] - [address]  [address] [facility] [address]  Phone: [phone] Fax: [phone]    Primary Care Provider: Gunnar Sylvester DO    Primary Insurance: [employer]  REP  Secondary Insurance: [place] [employer]    DISCHARGE DETAILS:    VM left for pts Supports Coordinator, Stephania Damon at [employer] (PH: [phone]).  Per her VM she will be out of the office until 9/16.  CM left VM for Kirsten Morejon,  at [employer] (PH: [phone]) requesting a call back to discuss the Pts d/c plan.  Pt remains medically  stable for d/c.     CM received a returned phone call from Supports Coordinator, Stephania Damon at [employer]. stating that it would take just as long to find the Pt another ICF group home as it will for SNF placement.     CM is still awaiting a LOC determination from [facility].

## 2024-09-11 NOTE — PROGRESS NOTES
"Progress Note - Hospitalist   Name: Terrie Alicea 64 y.o. female I MRN: [id number]  Unit/Bed#: S -01 I Date of Admission: 8/24/2024   Date of Service: 9/11/2024 I Hospital Day: 18    Assessment & Plan  Dysphagia  CT neck no organic obstructions in the mediastinum. Pt has had poor PO intake, malnutrition requiring gastrostomy tube placement on 08/26. Per group home, pt attends day program which cannot manage tube feeds so requesting her to receive it from 6pm to 6am. Settings adjusted to take this into consideration per nutrition.     Plan:  Per Nutrition: \"Adjust tube feed order to: 70ml water before and after running the feeding, 70ml q 2 hours while feeding is running. 1263 mL total fee water daily with formula and flushes. Continue Glucerna 1.2 at 80ml/hr x 12 hours. 960mL glucerna, 1152 kcal daily.\"  Continue Glucerna 1.2 at 80 cc/h x 12h  70 mL water flush before & after TF cycle  70 mL q2h water flushes while feeding is running  1263 mL total free water daily with formula & flushes  Per SLP: can introduce pleasure feeds or puree & HTL as tolerated  DM (diabetes mellitus) (HCC)  Lab Results   Component Value Date    HGBA1C 6.2 (H) 08/13/2024     Recent Labs     09/11/24  0545 09/11/24  1144 09/11/24  1356 09/11/24  1559   POCGLU 181* 74 159* 83     Blood Sugar Average: Last 72 hrs:  (P) [id number]    PMHx of diabetes. Pt having hyperglycemic episodes in the setting of tube feeds, possibly related to refeeding syndrome although pt has not had hypokalemia, hypomagnesemia, hypophosphatemia. Required insulin drip on 8/29 as BG>500, discontinued when BG<200. However, pt became hyperglycemic overnight 8/30 in the setting of tube feeds, MAP ~54 w minimal response to fluids and albumin, transferred to ICU. Per review of crit care notes, pt was briefly on Levophed; low suspicion of infectious etiology as pt has not had leukocytosis and has been afebrile, imaging unremarkable. Pt stable, nutrition " switched feed from Jevity 1.5 to Glucerna 1.2, transferred back to med/surg on 8/31. Pt was doing well on 25u Lantus however became hypoglycemic; Lantus decreased to 18u on 9/6, then to 15u on 9/7. On 9/8 switched Lantus 15u to NPH 15u to prevent evening hypoglycemia in the setting of cyclic tube feeds; however pt still hyperglycemic w BG>200 in the AM. 9/9 started NPH 20u at 6PM, and 5u at 6AM, to correspond to tube feed timeline. BGs improved. Discontinued sliding scale insulin d/t pt's afternoon hypoglycemia, managed with D5 bolus, resolved.      - Continue NPH 20 units at 6PM, to be given when initiating tube feeds.  - Continue NPH 5 units at 6AM to be given when tube feeds are complete.   - BG checks q6h  - Hypoglycemia protocol in place  Hypotension  Pt noted to be hypotensive intermittently throughout hospitalization. Per caregivers and record review, pt's BP tends to run on softer side. Pt's BP have been appropriate.      - Midodrine 5 mg TID  - Compression stockings  - VS q4h  - fluid intake per Nutrition, see plan under dysphagia  Cerebral palsy (HCC)  Continue baclofen 10 mg TID  Incontinence  Continue oxybutynin 5 mg TID  Parkinson's disease  History of mood disturbances, previously on neuroleptics. Tardive dyskinesia on exam.    Sinemet  qid. Can defer changes to outpatient neurology.   Mood disturbances  - Continue home meds: Abilify 2 mg, Citalopram 40 mg.  - Pt previously on trazodone 100 mg q HS which was discontinued on 8/30 while in the ICU d/t contributions to sedation.    - Continue Depakene oral solution 125 mg q12h for mood, can defer changes per outpatient psychiatry as deemed necessary.  - Pt has been stable from this standpoint throughout hospitalization, hasn't required restarting of trazodone or escalation in valproic acid thus far.     VTE Pharmacologic Prophylaxis: VTE Score: 7 High Risk (Score >/= 5) - Pharmacological DVT Prophylaxis Ordered: enoxaparin (Lovenox). Sequential  Compression Devices Ordered.    Mobility:   Basic Mobility Inpatient Raw Score: 6  JH-HLM Goal: 2: Bed activities/Dependent transfer  JH-HLM Achieved: 2: Bed activities/Dependent transfer  JH-HLM Goal achieved. Continue to encourage appropriate mobility.    Patient Centered Rounds: I performed bedside rounds with nursing staff today.     Education and Discussions with Family / Patient: Attempted to update  (facility) via phone. Unable to contact.    Current Length of Stay: 18 day(s)  Current Patient Status: Inpatient   Discharge Plan:  TBD, pt is pending placement    Code Status: Level 1 - Full Code    Subjective   Pt seen and evaluated at bedside. Does not appear to be in any acute distress. Normal respirations observed. Physical examination unchanged.     Objective     Vitals:   Temp (24hrs), Av.5 °F (36.9 °C), Min:97.8 °F (36.6 °C), Max:98.9 °F (37.2 °C)    Temp:  [97.8 °F (36.6 °C)-98.9 °F (37.2 °C)] 98.9 °F (37.2 °C)  HR:  [49-58] 49  Resp:  [18] 18  BP: ()/(47-64) 102/58  SpO2:  [93 %-96 %] 96 %  Body mass index is 19.58 kg/m².     Input and Output Summary (last 24 hours):     Intake/Output Summary (Last 24 hours) at 2024 1805  Last data filed at 2024 1330  Gross per 24 hour   Intake 0 ml   Output --   Net 0 ml       Physical Exam  Vitals reviewed.   Constitutional:       General: She is not in acute distress.     Appearance: She is not toxic-appearing.   HENT:      Head: Normocephalic and atraumatic.      Right Ear: External ear normal.      Left Ear: External ear normal.      Nose: Nose normal.      Mouth/Throat:      Mouth: Mucous membranes are dry.      Comments: Orofacial dyskinesia (chronic)  Eyes:      Extraocular Movements: Extraocular movements intact.   Cardiovascular:      Rate and Rhythm: Normal rate and regular rhythm.      Pulses: Normal pulses.      Heart sounds: Normal heart sounds.   Pulmonary:      Effort: Pulmonary effort is normal. No respiratory distress.    Abdominal:      General: Bowel sounds are normal. There is no distension.      Tenderness: There is no guarding.   Musculoskeletal:         General: Deformity (chronic) present.   Skin:     General: Skin is warm and dry.      Capillary Refill: Capillary refill takes less than 2 seconds.      Coloration: Skin is not jaundiced or pale.   Neurological:      Mental Status: She is alert. Mental status is at baseline.          Lines/Drains:  Lines/Drains/Airways       Active Status       Name Placement date Placement time Site Days    Gastrostomy/Enterostomy  16 Fr. LUQ 08/26/24  1643  LUQ  16                            Lab Results: I have reviewed the following results:    Results from last 7 days   Lab Units 09/11/24  0611 09/06/24  0543 09/05/24  0440   WBC Thousand/uL 5.42   < > 5.61   HEMOGLOBIN g/dL 9.3*   < > 8.7*   HEMATOCRIT % 29.4*   < > 27.5*   PLATELETS Thousands/uL 197   < > 186   SEGS PCT %  --   --  41*   LYMPHO PCT %  --   --  38   MONO PCT %  --   --  9   EOS PCT %  --   --  11*    < > = values in this interval not displayed.     Results from last 7 days   Lab Units 09/11/24  0611 09/06/24  0543 09/05/24  0440   SODIUM mmol/L 140   < > 139   POTASSIUM mmol/L 5.4*   < > 4.7   CHLORIDE mmol/L 97   < > 101   CO2 mmol/L 36*   < > 35*   BUN mg/dL 36*   < > 24   CREATININE mg/dL 1.08   < > 0.88   ANION GAP mmol/L 7   < > 3*   CALCIUM mg/dL 10.6*   < > 9.4   ALBUMIN g/dL  --   --  3.1*   TOTAL BILIRUBIN mg/dL  --   --  0.18*   ALK PHOS U/L  --   --  43   ALT U/L  --   --  8   AST U/L  --   --  18   GLUCOSE RANDOM mg/dL 169*   < > 127    < > = values in this interval not displayed.         Results from last 7 days   Lab Units 09/11/24  1559 09/11/24  1356 09/11/24  1144 09/11/24  0545 09/11/24  0000 09/10/24  1820 09/10/24  1732 09/10/24  1713 09/10/24  1236 09/10/24  1034 09/10/24  0728 09/10/24  0551   POC GLUCOSE mg/dl 83 159* 74 181* 149* 222* 58* 57* 161* 217* 177* 187*         Results from last 7 days    Lab Units 09/05/24  0440   LACTIC ACID mmol/L 1.0   PROCALCITONIN ng/ml 0.09       Recent Cultures (last 7 days):         Imaging Review: Reviewed radiology reports from this admission including: chest xray and CT abdomen/pelvis.    Last 24 Hours Medication List:     Current Facility-Administered Medications:     acetaminophen (Ofirmev) IVPB 630 mg, Q8H PRN, Last Rate: 630 mg (09/03/24 0038)    ammonium lactate (LAC-HYDRIN) 12 % cream, BID PRN    ARIPiprazole (ABILIFY) tablet 2 mg, HS    artificial tear ophthalmic ointment, HS PRN    ascorbic acid (VITAMIN C) tablet 1,000 mg, Daily    baclofen tablet 10 mg, TID    calcium carbonate (TUMS) chewable tablet 750 mg, BID    carbamide peroxide (DEBROX) 6.5 % otic solution 2 drop, BID PRN    carbidopa-levodopa (SINEMET)  mg per tablet 1 tablet, 4x Daily    Cholecalciferol (VITAMIN D3) tablet 1,000 Units, Daily    citalopram (CeleXA) tablet 40 mg, Daily    dextromethorphan-guaiFENesin (ROBITUSSIN DM) oral syrup 10 mL, Q4H PRN    docosanol (ABREVA) 10 % cream, 5x Daily    enoxaparin (LOVENOX) subcutaneous injection 40 mg, Daily    Ferrous Sulfate oral syrup 300 mg, BID AC    fluticasone (FLONASE) 50 mcg/act nasal spray 1 spray, Daily PRN    hydrocortisone 1 % cream, 4x Daily PRN    insulin NPH (HumuLIN N,NovoLIN N) 100 Units/mL subcutaneous injection 20 Units, HS    [START ON 9/12/2024] insulin NPH (HumuLIN N,NovoLIN N) 100 Units/mL subcutaneous injection 4 Units, Daily Before Breakfast    Ketotifen Fumarate (ZADITOR) 0.035 % ophthalmic solution 1 drop, Daily PRN    levothyroxine tablet 25 mcg, QAM    Loratadine (CLARITIN) oral soln 10 mg, Daily PRN    LORazepam (ATIVAN) tablet 0.5 mg, HS    methenamine hippurate (HIPREX) tablet 1 g, BID    midodrine (PROAMATINE) tablet 5 mg, TID AC    omeprazole (PRILOSEC) suspension 2 mg/mL, Daily    oxybutynin (DITROPAN) tablet 5 mg, TID    polyethylene glycol (MIRALAX) packet 17 g, Daily    pravastatin (PRAVACHOL) tablet 40 mg,  Daily With Dinner    valproic acid (DEPAKENE) oral soln 125 mg, Q12H ROSEMARY    Administrative Statements   Today, Patient Was Seen By: Silvana Mike DO  PGY-1      **Please Note: This note may have been constructed using a voice recognition system.**

## 2024-09-11 NOTE — CASE MANAGEMENT
Case Management Progress Note    Patient name Terrie Alicea  Location S /S -01 MRN [id number]  : 1960 Date 2024       LOS (days): 18  Geometric Mean LOS (GMLOS) (days):   Days to GMLOS:        OBJECTIVE:        Current admission status: Inpatient  Preferred Pharmacy:   [facility]  [place] - [address]  [address] [facility]  [address]  [place] [address]  Phone: [phone] Fax: [phone]    [facility]  [address]  [place] [address]  Phone: [phone] Fax: [phone]    [facility] [place], [place] - [address]  [address] [facility] [address]  Phone: [phone] Fax: [phone]    [facility] [place] [facility] [place] - [address]  [address]  [place] [address]  Phone: [phone] Fax: [phone]    Primary Care Provider: Gunnar Sylvester DO    Primary Insurance: [employer] MEDICARE [facility]  Secondary Insurance: [place] MEDICAL [employer]ISTANCE    PROGRESS NOTE:    Weekly Care Management Length of Stay Review     Current LOS: 18 Days    Most Recent Labs:     Lab Results   Component Value Date/Time    WBC 5.42 2024 06:11 AM    HGB 9.3 (L) 2024 06:11 AM    HCT 29.4 (L) 2024 06:11 AM     2024 06:11 AM    SODIUM 140 2024 06:11 AM    K 5.4 (H) 2024 06:11 AM    CL 97 2024 06:11 AM    CO2 36 (H) 2024 06:11 AM    BUN 36 (H) 2024 06:11 AM    CREATININE 1.08 2024 06:11 AM    GLUC 169 (H) 2024 06:11 AM       Most Recent Vitals:   Vitals:    24 0711   BP: 107/64   Pulse: (!) 54   Resp:    Temp: 98.8 °F (37.1 °C)   SpO2: 96%        Identified Barriers to Discharge/Discharge Goals/Care Management Interventions: dysphagia, medically stable    Intended Discharge Disposition: Return to group home-pending training of staff on insulin, vs a SNF placement-level 2 needed    Expected Discharge Date:   TBD

## 2024-09-11 NOTE — ASSESSMENT & PLAN NOTE
Lab Results   Component Value Date    HGBA1C 6.2 (H) 08/13/2024     Recent Labs     09/11/24  0545 09/11/24  1144 09/11/24  1356 09/11/24  1559   POCGLU 181* 74 159* 83     Blood Sugar Average: Last 72 hrs:  (P) [id number]    PMHx of diabetes. Pt having hyperglycemic episodes in the setting of tube feeds, possibly related to refeeding syndrome although pt has not had hypokalemia, hypomagnesemia, hypophosphatemia. Required insulin drip on 8/29 as BG>500, discontinued when BG<200. However, pt became hyperglycemic overnight 8/30 in the setting of tube feeds, MAP ~54 w minimal response to fluids and albumin, transferred to ICU. Per review of crit care notes, pt was briefly on Levophed; low suspicion of infectious etiology as pt has not had leukocytosis and has been afebrile, imaging unremarkable. Pt stable, nutrition switched feed from Jevity 1.5 to Glucerna 1.2, transferred back to med/surg on 8/31. Pt was doing well on 25u Lantus however became hypoglycemic; Lantus decreased to 18u on 9/6, then to 15u on 9/7. On 9/8 switched Lantus 15u to NPH 15u to prevent evening hypoglycemia in the setting of cyclic tube feeds; however pt still hyperglycemic w BG>200 in the AM. 9/9 started NPH 20u at 6PM, and 5u at 6AM, to correspond to tube feed timeline. BGs improved. Discontinued sliding scale insulin d/t pt's afternoon hypoglycemia, managed with D5 bolus, resolved.      - Continue NPH 20 units at 6PM, to be given when initiating tube feeds.  - Continue NPH 5 units at 6AM to be given when tube feeds are complete.   - BG checks q6h  - Hypoglycemia protocol in place

## 2024-09-12 LAB
ANION GAP SERPL CALCULATED.3IONS-SCNC: 7 MMOL/L (ref 4–13)
BUN SERPL-MCNC: 37 MG/DL (ref 5–25)
CALCIUM SERPL-MCNC: 10.2 MG/DL (ref 8.4–10.2)
CHLORIDE SERPL-SCNC: 97 MMOL/L (ref 96–108)
CO2 SERPL-SCNC: 36 MMOL/L (ref 21–32)
CREAT SERPL-MCNC: 1.14 MG/DL (ref 0.6–1.3)
ERYTHROCYTE [DISTWIDTH] IN BLOOD BY AUTOMATED COUNT: 13.8 % (ref 11.6–15.1)
GFR SERPL CREATININE-BSD FRML MDRD: 51 ML/MIN/1.73SQ M
GLUCOSE SERPL-MCNC: 108 MG/DL (ref 65–140)
GLUCOSE SERPL-MCNC: 120 MG/DL (ref 65–140)
GLUCOSE SERPL-MCNC: 135 MG/DL (ref 65–140)
GLUCOSE SERPL-MCNC: 153 MG/DL (ref 65–140)
GLUCOSE SERPL-MCNC: 194 MG/DL (ref 65–140)
GLUCOSE SERPL-MCNC: 208 MG/DL (ref 65–140)
GLUCOSE SERPL-MCNC: 56 MG/DL (ref 65–140)
GLUCOSE SERPL-MCNC: 61 MG/DL (ref 65–140)
GLUCOSE SERPL-MCNC: 71 MG/DL (ref 65–140)
GLUCOSE SERPL-MCNC: 79 MG/DL (ref 65–140)
GLUCOSE SERPL-MCNC: 81 MG/DL (ref 65–140)
GLUCOSE SERPL-MCNC: 94 MG/DL (ref 65–140)
HCT VFR BLD AUTO: 29 % (ref 34.8–46.1)
HGB BLD-MCNC: 8.7 G/DL (ref 11.5–15.4)
MCH RBC QN AUTO: 32.8 PG (ref 26.8–34.3)
MCHC RBC AUTO-ENTMCNC: 30 G/DL (ref 31.4–37.4)
MCV RBC AUTO: 109 FL (ref 82–98)
PLATELET # BLD AUTO: 164 THOUSANDS/UL (ref 149–390)
PMV BLD AUTO: 12.4 FL (ref 8.9–12.7)
POTASSIUM SERPL-SCNC: 4.9 MMOL/L (ref 3.5–5.3)
RBC # BLD AUTO: 2.65 MILLION/UL (ref 3.81–5.12)
SODIUM SERPL-SCNC: 140 MMOL/L (ref 135–147)
WBC # BLD AUTO: 5.53 THOUSAND/UL (ref 4.31–10.16)

## 2024-09-12 PROCEDURE — 97530 THERAPEUTIC ACTIVITIES: CPT

## 2024-09-12 PROCEDURE — 85027 COMPLETE CBC AUTOMATED: CPT

## 2024-09-12 PROCEDURE — 80048 BASIC METABOLIC PNL TOTAL CA: CPT

## 2024-09-12 PROCEDURE — 99232 SBSQ HOSP IP/OBS MODERATE 35: CPT | Performed by: HOSPITALIST

## 2024-09-12 PROCEDURE — 82948 REAGENT STRIP/BLOOD GLUCOSE: CPT

## 2024-09-12 RX ORDER — DEXTROSE MONOHYDRATE 25 G/50ML
INJECTION, SOLUTION INTRAVENOUS
Status: COMPLETED
Start: 2024-09-12 | End: 2024-09-12

## 2024-09-12 RX ORDER — DEXTROSE MONOHYDRATE 25 G/50ML
25 INJECTION, SOLUTION INTRAVENOUS ONCE
Status: COMPLETED | OUTPATIENT
Start: 2024-09-12 | End: 2024-09-12

## 2024-09-12 RX ADMIN — ENOXAPARIN SODIUM 40 MG: 40 INJECTION SUBCUTANEOUS at 08:59

## 2024-09-12 RX ADMIN — BACLOFEN 10 MG: 10 TABLET ORAL at 09:00

## 2024-09-12 RX ADMIN — DOCOSANOL: 100 CREAM TOPICAL at 06:08

## 2024-09-12 RX ADMIN — OXYBUTYNIN CHLORIDE 5 MG: 5 TABLET ORAL at 09:00

## 2024-09-12 RX ADMIN — DEXTROSE MONOHYDRATE 25 ML: 25 INJECTION, SOLUTION INTRAVENOUS at 18:10

## 2024-09-12 RX ADMIN — CARBIDOPA AND LEVODOPA 1 TABLET: 25; 100 TABLET ORAL at 22:40

## 2024-09-12 RX ADMIN — DEXTROSE MONOHYDRATE 50 ML: 25 INJECTION, SOLUTION INTRAVENOUS at 22:31

## 2024-09-12 RX ADMIN — VALPROIC ACID 125 MG: 250 SOLUTION ORAL at 22:40

## 2024-09-12 RX ADMIN — Medication 20 MG: at 10:00

## 2024-09-12 RX ADMIN — CALCIUM CARBONATE (ANTACID) CHEW TAB 500 MG 750 MG: 500 CHEW TAB at 09:00

## 2024-09-12 RX ADMIN — CARBIDOPA AND LEVODOPA 1 TABLET: 25; 100 TABLET ORAL at 11:36

## 2024-09-12 RX ADMIN — METHENAMINE HIPPURATE 1 G: 1 TABLET ORAL at 09:01

## 2024-09-12 RX ADMIN — DOCOSANOL: 100 CREAM TOPICAL at 17:20

## 2024-09-12 RX ADMIN — LEVOTHYROXINE SODIUM 25 MCG: 25 TABLET ORAL at 09:00

## 2024-09-12 RX ADMIN — Medication 1000 UNITS: at 09:00

## 2024-09-12 RX ADMIN — CALCIUM CARBONATE (ANTACID) CHEW TAB 500 MG 750 MG: 500 CHEW TAB at 22:40

## 2024-09-12 RX ADMIN — DOCOSANOL: 100 CREAM TOPICAL at 11:36

## 2024-09-12 RX ADMIN — PRAVASTATIN SODIUM 40 MG: 40 TABLET ORAL at 17:20

## 2024-09-12 RX ADMIN — ARIPIPRAZOLE 2 MG: 2 TABLET ORAL at 22:40

## 2024-09-12 RX ADMIN — MIDODRINE HYDROCHLORIDE 5 MG: 5 TABLET ORAL at 06:08

## 2024-09-12 RX ADMIN — METHENAMINE HIPPURATE 1 G: 1 TABLET ORAL at 17:20

## 2024-09-12 RX ADMIN — CARBIDOPA AND LEVODOPA 1 TABLET: 25; 100 TABLET ORAL at 17:19

## 2024-09-12 RX ADMIN — INSULIN HUMAN 20 UNITS: 100 INJECTION, SUSPENSION SUBCUTANEOUS at 18:47

## 2024-09-12 RX ADMIN — BACLOFEN 10 MG: 10 TABLET ORAL at 22:40

## 2024-09-12 RX ADMIN — CARBIDOPA AND LEVODOPA 1 TABLET: 25; 100 TABLET ORAL at 09:00

## 2024-09-12 RX ADMIN — INSULIN HUMAN 4 UNITS: 100 INJECTION, SUSPENSION SUBCUTANEOUS at 06:09

## 2024-09-12 RX ADMIN — OXYBUTYNIN CHLORIDE 5 MG: 5 TABLET ORAL at 17:19

## 2024-09-12 RX ADMIN — CITALOPRAM HYDROBROMIDE 40 MG: 20 TABLET ORAL at 09:00

## 2024-09-12 RX ADMIN — DEXTROSE MONOHYDRATE 25 ML: 25 INJECTION, SOLUTION INTRAVENOUS at 11:35

## 2024-09-12 RX ADMIN — VALPROIC ACID 125 MG: 250 SOLUTION ORAL at 08:59

## 2024-09-12 RX ADMIN — MIDODRINE HYDROCHLORIDE 5 MG: 5 TABLET ORAL at 11:36

## 2024-09-12 RX ADMIN — LORAZEPAM 0.5 MG: 0.5 TABLET ORAL at 22:40

## 2024-09-12 RX ADMIN — DOCOSANOL: 100 CREAM TOPICAL at 15:34

## 2024-09-12 RX ADMIN — OXYCODONE HYDROCHLORIDE AND ACETAMINOPHEN 1000 MG: 500 TABLET ORAL at 09:00

## 2024-09-12 RX ADMIN — MIDODRINE HYDROCHLORIDE 5 MG: 5 TABLET ORAL at 17:19

## 2024-09-12 RX ADMIN — FERROUS SULFATE 300 MG: 300 SOLUTION ORAL at 17:19

## 2024-09-12 RX ADMIN — OXYBUTYNIN CHLORIDE 5 MG: 5 TABLET ORAL at 22:40

## 2024-09-12 RX ADMIN — FERROUS SULFATE 300 MG: 300 SOLUTION ORAL at 06:08

## 2024-09-12 RX ADMIN — POLYETHYLENE GLYCOL 3350 17 G: 17 POWDER, FOR SOLUTION ORAL at 08:59

## 2024-09-12 RX ADMIN — BACLOFEN 10 MG: 10 TABLET ORAL at 17:19

## 2024-09-12 RX ADMIN — DOCOSANOL: 100 CREAM TOPICAL at 23:07

## 2024-09-12 NOTE — PROGRESS NOTES
"Progress Note - Hospitalist   Name: Terrie Alicea 64 y.o. female I MRN: [id number]  Unit/Bed#: S -01 I Date of Admission: 8/24/2024   Date of Service: 9/12/2024 I Hospital Day: 19    Assessment & Plan  Dysphagia  CT neck no organic obstructions in the mediastinum. Pt has had poor PO intake, malnutrition requiring gastrostomy tube placement on 08/26. Per group home, pt attends day program which cannot manage tube feeds so requesting her to receive it from 6pm to 6am. Settings adjusted to take this into consideration per nutrition. Nutrition's recommendations to change tube feed from Glucerna to Nepro d/t lower potassium content of the latter.     Plan:  Per Nutrition: \"Nepro at 60ml/hr x12hrs with 125ml H2O flushes q 4hrs will provide 720ml, 1296kcal (30kcal/kg), 58g protein (1.4g/kg), 115g CHO, 1273ml H2O from TF formula and flushes (30ml/kg), ~683mg K (850mg K less than glucerna). calorie, protein and CHO content of nepro are similar glucerna 1.2.\"  Re-consult speech for swallow eval   DM (diabetes mellitus) ([facility])  Lab Results   Component Value Date    HGBA1C 6.2 (H) 08/13/2024     Recent Labs     09/12/24  1016 09/12/24  1311 09/12/24  1706 09/12/24  1717   POCGLU 61* 79 81 71     Blood Sugar Average: Last 72 hrs:  (P) [id number]    PMHx of diabetes. Pt having hyperglycemic episodes in the setting of tube feeds, possibly related to refeeding syndrome although pt has not had hypokalemia, hypomagnesemia, hypophosphatemia. Required insulin drip on 8/29 as BG>500, discontinued when BG<200. However, pt became hyperglycemic overnight 8/30 in the setting of tube feeds, MAP ~54 w minimal response to fluids and albumin, transferred to ICU. Pt stable, nutrition switched feed from Jevity 1.5 to Glucerna 1.2, transferred back to med/surg on 8/31. Pt was doing well on 25u Lantus however became hypoglycemic; Lantus decreased to 18u on 9/6, then to 15u on 9/7. On 9/8 switched Lantus 15u to NPH 15u to prevent " evening hypoglycemia in the setting of cyclic tube feeds; however pt still hyperglycemic w BG>200 in the AM.  9/11 NPH 15u evening and NPH 5u morning, and discontinued sliding scale insulin d/t pt's afternoon hypoglycemia. 9/12 discontinued morning NPH d/t patient's mid-morning hypoglycemia.     - Continue NPH 20 units at 6PM, to be given when initiating tube feeds.  - Discontinue NPH 5 units at 6AM  - BG checks q6h  - Hypoglycemia protocol in place  Hypotension  Pt noted to be hypotensive intermittently throughout hospitalization. Per caregivers and record review, pt's BP tends to run on softer side. Pt's BP have been appropriate.      - Midodrine 5 mg TID  - Compression stockings  - VS q4h  - fluid intake per Nutrition, see plan under dysphagia  Cerebral palsy (HCC)  Continue baclofen 10 mg TID  Incontinence  Continue oxybutynin 5 mg TID  Parkinson's disease  History of mood disturbances, previously on neuroleptics. Tardive dyskinesia on exam.    Sinemet  qid. Can defer changes to outpatient neurology.   Mood disturbances  - Continue home meds: Abilify 2 mg, Citalopram 40 mg.  - Pt previously on trazodone 100 mg q HS which was discontinued on 8/30 while in the ICU d/t contributions to sedation.    - Continue Depakene oral solution 125 mg q12h for mood, can defer changes per outpatient psychiatry as deemed necessary.  - Pt has been stable from this standpoint throughout hospitalization, hasn't required restarting of trazodone or escalation in valproic acid thus far.     VTE Pharmacologic Prophylaxis: VTE Score: 7 High Risk (Score >/= 5) - Pharmacological DVT Prophylaxis Ordered: enoxaparin (Lovenox). Sequential Compression Devices Ordered.    Mobility:   Basic Mobility Inpatient Raw Score: 6  JH-HLM Goal: 2: Bed activities/Dependent transfer  JH-HLM Achieved: 4: Move to chair/commode  JH-HLM Goal achieved. Continue to encourage appropriate mobility.    Patient Centered Rounds: I performed bedside rounds with  nursing staff today.   Discussions with Specialists or Other Care Team Provider: Nutrition    Education and Discussions with Family / Patient: Updated  (caregiver) at bedside.    Current Length of Stay: 19 day(s)  Current Patient Status: Inpatient   Discharge Plan:  TBD, pt is pending placement as group home still needs staff training for insulin administration and pt is still being evaluated for SNF placement - referrals have been sent out per CM    Code Status: Level 1 - Full Code    Subjective   Pt seen and examined at bedside. She is resting bed, tracking movement with her eyes, and responding to verbal stimuli. She does not appear to be in any acute distress.     Per RN, pt's BG 61 mid-morning, given D5 bolus with improvement.     Objective     Vitals:   Temp (24hrs), Av.9 °F (37.2 °C), Min:98.8 °F (37.1 °C), Max:99.1 °F (37.3 °C)    Temp:  [98.8 °F (37.1 °C)-99.1 °F (37.3 °C)] 98.9 °F (37.2 °C)  HR:  [51-65] 60  BP: ()/(44-61) 95/61  SpO2:  [93 %-96 %] 95 %  Body mass index is 19.63 kg/m².     Input and Output Summary (last 24 hours):     Intake/Output Summary (Last 24 hours) at 2024 1723  Last data filed at 2024 0900  Gross per 24 hour   Intake 240 ml   Output --   Net 240 ml       Physical Exam  Vitals reviewed.   Constitutional:       General: She is not in acute distress.     Appearance: She is not toxic-appearing.   HENT:      Head: Normocephalic and atraumatic.      Right Ear: External ear normal.      Left Ear: External ear normal.      Nose: Nose normal.      Mouth/Throat:      Mouth: Mucous membranes are dry.      Comments: Orofacial dyskinesia (chronic)  Eyes:      Extraocular Movements: Extraocular movements intact.   Cardiovascular:      Rate and Rhythm: Normal rate and regular rhythm.      Pulses: Normal pulses.      Heart sounds: Normal heart sounds.   Pulmonary:      Effort: Pulmonary effort is normal. No respiratory distress.   Abdominal:      General: Bowel  sounds are normal. There is no distension.      Tenderness: There is no guarding.   Musculoskeletal:         General: Deformity (chronic) present.   Skin:     General: Skin is warm and dry.      Capillary Refill: Capillary refill takes less than 2 seconds.      Coloration: Skin is not jaundiced or pale.   Neurological:      Mental Status: She is alert. Mental status is at baseline.          Lines/Drains:  Lines/Drains/Airways       Active Status       Name Placement date Placement time Site Days    Gastrostomy/Enterostomy  16 Fr. LUQ 08/26/24  1643  LUQ  17                            Lab Results: I have reviewed the following results: CBC/BMP:   .     09/12/24  0514   WBC 5.53   HGB 8.7*   HCT 29.0*      SODIUM 140   K 4.9   CL 97   CO2 36*   BUN 37*   CREATININE 1.14   GLUC 108       Results from last 7 days   Lab Units 09/12/24  0514   WBC Thousand/uL 5.53   HEMOGLOBIN g/dL 8.7*   HEMATOCRIT % 29.0*   PLATELETS Thousands/uL 164     Results from last 7 days   Lab Units 09/12/24  0514   SODIUM mmol/L 140   POTASSIUM mmol/L 4.9   CHLORIDE mmol/L 97   CO2 mmol/L 36*   BUN mg/dL 37*   CREATININE mg/dL 1.14   ANION GAP mmol/L 7   CALCIUM mg/dL 10.2   GLUCOSE RANDOM mg/dL 108         Results from last 7 days   Lab Units 09/12/24  1717 09/12/24  1706 09/12/24  1311 09/12/24  1016 09/12/24  0718 09/12/24  0603 09/12/24  0406 09/11/24  2002 09/11/24  1924 09/11/24  1559 09/11/24  1356 09/11/24  1144   POC GLUCOSE mg/dl 71 81 79 61* 120 135 94 245* 52* 83 159* 74               Recent Cultures (last 7 days):         Imaging Review: Reviewed radiology reports from this admission including: chest xray and CT abdomen/pelvis.    Last 24 Hours Medication List:     Current Facility-Administered Medications:     acetaminophen (Ofirmev) IVPB 630 mg, Q8H PRN, Last Rate: 630 mg (09/03/24 0038)    ammonium lactate (LAC-HYDRIN) 12 % cream, BID PRN    ARIPiprazole (ABILIFY) tablet 2 mg, HS    artificial tear ophthalmic ointment, HS  PRN    ascorbic acid (VITAMIN C) tablet 1,000 mg, Daily    baclofen tablet 10 mg, TID    calcium carbonate (TUMS) chewable tablet 750 mg, BID    carbamide peroxide (DEBROX) 6.5 % otic solution 2 drop, BID PRN    carbidopa-levodopa (SINEMET)  mg per tablet 1 tablet, 4x Daily    Cholecalciferol (VITAMIN D3) tablet 1,000 Units, Daily    citalopram (CeleXA) tablet 40 mg, Daily    dextromethorphan-guaiFENesin (ROBITUSSIN DM) oral syrup 10 mL, Q4H PRN    docosanol (ABREVA) 10 % cream, 5x Daily    enoxaparin (LOVENOX) subcutaneous injection 40 mg, Daily    Ferrous Sulfate oral syrup 300 mg, BID AC    fluticasone (FLONASE) 50 mcg/act nasal spray 1 spray, Daily PRN    hydrocortisone 1 % cream, 4x Daily PRN    insulin NPH (HumuLIN N,NovoLIN N) 100 Units/mL subcutaneous injection 20 Units, HS    Ketotifen Fumarate (ZADITOR) 0.035 % ophthalmic solution 1 drop, Daily PRN    levothyroxine tablet 25 mcg, QAM    Loratadine (CLARITIN) oral soln 10 mg, Daily PRN    LORazepam (ATIVAN) tablet 0.5 mg, HS    methenamine hippurate (HIPREX) tablet 1 g, BID    midodrine (PROAMATINE) tablet 5 mg, TID AC    omeprazole (PRILOSEC) suspension 2 mg/mL, Daily    oxybutynin (DITROPAN) tablet 5 mg, TID    polyethylene glycol (MIRALAX) packet 17 g, Daily    pravastatin (PRAVACHOL) tablet 40 mg, Daily With Dinner    valproic acid (DEPAKENE) oral soln 125 mg, Q12H ROSEMARY    Administrative Statements   Today, Patient Was Seen By: Silvana Mike DO  PGY-1      **Please Note: This note may have been constructed using a voice recognition system.**

## 2024-09-12 NOTE — PHYSICAL THERAPY NOTE
PHYSICAL THERAPY TREATMENT NOTE  NAME:  Terrie Alicea  DATE: 09/12/24    Length Of Stay: 19  Performed at least 2 patient identifiers during session: ID bracelet and Epic photo    TREATMENT:    09/12/24 1425   PT Last Visit   PT Visit Date 09/12/24   Note Type   Note Type Treatment   Pain Assessment   Pain Assessment Tool FLACC   Pain Score   (Pt non verbal)   Hospital Pain Intervention(s) Ambulation/increased activity;Medication (See MAR);Repositioned;Emotional support  (LE stretching, soft care cushion)   Pain Rating: FLACC (Rest) - Face 0   Pain Rating: FLACC (Rest) - Legs 1   Pain Rating: FLACC (Rest) - Activity 1   Pain Rating: FLACC (Rest) - Cry 0   Pain Rating: FLACC (Rest) - Consolability 0   Score: FLACC (Rest) 2   Pain Rating: FLACC (Activity) - Face 1   Pain Rating: FLACC (Activity) - Legs 1   Pain Rating: FLACC (Activity) - Activity 1   Pain Rating: FLACC (Activity) - Cry 0   Pain Rating: FLACC (Activity) - Consolability 0   Score: FLACC (Activity) 3   Restrictions/Precautions   Weight Bearing Precautions Per Order No   Other Precautions Bed Alarm;Chair Alarm;Cognitive;Fall Risk  (non verbal)   General   Chart Reviewed Yes   Response to Previous Treatment Other (Comment)  (Pt unable to report, non verbal)   Family/Caregiver Present No   Cognition   Overall Cognitive Status Impaired   Arousal/Participation Alert  (tracking via eyes and head movement throughout session)   Orientation Level Unable to assess   Memory Unable to assess   Following Commands   (Does follow facilitation/gestures at times)   Subjective   Subjective Pt non verbal, displays tartive dyskinesia-like movement @ face   , but sunjective displayes more tracking and head movements   Bed Mobility   Rolling R 1  Dependent   Additional items Assist x 1;Increased time required;Verbal cues;LE management   Rolling L 1  Dependent   Additional items Assist x 1;Increased time required;LE management;Verbal cues   Supine to Sit 1  Dependent    Additional items Assist x 1;Increased time required;Verbal cues  (L egress)   Sit to Supine   (Pt OOB in chair upon exiting room)   Transfers   Sit pivot 1  Dependent   Additional items Assist x 1  (Successful on first trial to Perform pivot to R side into drop arm recliner, staff present for second S if needed. Pt does display L knee flexion/hypertonicity , but less degree and does NOT place L foot under bed prior to transfer.)   Ambulation/Elevation   Gait pattern Not appropriate  (Pt non amb prior to admission)   Balance   Static Sitting   (Between poor and zero: Pt does not PUSH into extension, flexion, SB, but only self corrects her balance < 25% of the time.)   Static Standing Zero   Endurance Deficit   Endurance Deficit Yes   Endurance Deficit Description Pt closed eyes more often once in chair. BP monitored pre and post transfer   Activity Tolerance   Activity Tolerance Other (Comment)  (hypertonicity)   Medical Staff Made Aware prior to today's session spoke to  Chase from OT, Lisa from PT   Nurse Made Aware care coordination w/Karen Alvarado RN--charge RN not immediately available post session   Exercises   Hamstring Stretch Supine;Bilateral;PROM;AAROM  (x1- 15 seconds)   Ankle Pumps Supine;Right  (with grade 1-2 joint mobilizations @ foot)   Assessment   Prognosis Guarded   Problem List Decreased strength;Decreased range of motion;Decreased endurance;Impaired balance;Decreased mobility;Decreased coordination;Decreased cognition;Impaired judgement;Decreased safety awareness;Impaired tone  (limited command following, non verbal)   Assessment Based on report from staff nurse and PTA, is is not clear if pt has been OOB other than w/ PT.   Care coordination w/ RN before, during and post session both for S and physical A if needed, as well as suggestions to promote Pt being OOB at least once a day by nursing staff. Pt now has drop arm recliner in room for ease of transfers/minimizing burden of care. Recommend  nursing staff assist pt OOB to recliner either with sit pivot transfers (preferably to R side if possible) or use of adela lift. Recommend pt positioning in recliner w/ wedges @ side to facilitate upright sitting posture, and step stool under pt's feet to promote 90-90 positioning in pt w/ shorter stature. No further IPPT indicated at this time. Will reach out to charge nurse to promote daily nursing mobility as medically appropriate, and RN to pass on in her report.   Barriers to Discharge Decreased caregiver support;Inaccessible home environment   Goals   Patient Goals none stated   PT Treatment Day 0   Plan   Treatment/Interventions Functional transfer training;LE strengthening/ROM;Therapeutic exercise;Patient/family training;Equipment eval/education;Bed mobility;Spoke to nursing   Progress Discontinue PT   Discharge Recommendation   Rehab Resource Intensity Level, PT III (Minimum Resource Intensity)  (Pt appears consistently at reported mobility baseline)   Equipment Recommended Wheelchair  (anticipate pt likely has specialty WC, but no further information available other that staff use adela lift intermittently)   AM-PAC Basic Mobility Inpatient   Turning in Flat Bed Without Bedrails 1   Lying on Back to Sitting on Edge of Flat Bed Without Bedrails 1   Moving Bed to Chair 1   Standing Up From Chair Using Arms 1   Walk in Room 1   Climb 3-5 Stairs With Railing 1   Basic Mobility Inpatient Raw Score 6   Turning Head Towards Sound 3   Follow Simple Instructions 2   Low Function Basic Mobility Raw Score  11   Low Function Basic Mobility Standardized Score  16.55   [facility] Highest Level Of Mobility   -HLM Goal 2: Bed activities/Dependent transfer   -HLM Achieved 4: Move to chair/commode   Education   Education Provided Mobility training;Home exercise program   Patient Reinforcement needed   End of Consult   Patient Position at End of Consult All needs within reach;Bed/Chair alarm activated;Bedside  chair  (with RN provided at least 2 options on how to A pt back to bed)     The patient's -[facility] Basic Mobility Inpatient Short Form Raw Score is 6. A Raw score of less than or equal to 16 suggests the patient may benefit from discharge to post-acute rehabilitation services, which MAY coincide with CURRENT above PT recommendations. However please note that this is pt's mobility baseline     However please refer to therapist recommendation for discharge planning given other factors that may influence destination.     Adapted from Rodney FREED, Brett J, Kofi J, Candy J. [employer] [facility] “6-Clicks” Basic Mobility and Daily Activity Scores With Discharge Destination. Physical Therapy, 2021;101:1-9. DOI: [email or web address]    Osmani Jacome PT, DPT

## 2024-09-12 NOTE — NUTRITION
09/12/24 1342   Recommendations/Interventions   Intervention Comments nepro at 60ml/hr x12hrs with 125ml H2O flushes q 4hrs will provide 720ml, 1296kcal (30kcal/kg), 58g protein (1.4g/kg), 115g CHO, 1273ml H2O from TF formula and flushes (30ml/kg), ~683mg K (850mg K less than glucerna). calorie, protein and CHO content of nepro are similar glucerna 1.2     Due to hypoglycemia when off cyclic feeds, consider bolus feeds if Group Home able to accommodate

## 2024-09-12 NOTE — ASSESSMENT & PLAN NOTE
Lab Results   Component Value Date    HGBA1C 6.2 (H) 08/13/2024     Recent Labs     09/12/24  1016 09/12/24  1311 09/12/24  1706 09/12/24  1717   POCGLU 61* 79 81 71     Blood Sugar Average: Last 72 hrs:  (P) 137.0241932098179186    PMHx of diabetes. Pt having hyperglycemic episodes in the setting of tube feeds, possibly related to refeeding syndrome although pt has not had hypokalemia, hypomagnesemia, hypophosphatemia. Required insulin drip on 8/29 as BG>500, discontinued when BG<200. However, pt became hyperglycemic overnight 8/30 in the setting of tube feeds, MAP ~54 w minimal response to fluids and albumin, transferred to ICU. Pt stable, nutrition switched feed from Jevity 1.5 to Glucerna 1.2, transferred back to med/surg on 8/31. Pt was doing well on 25u Lantus however became hypoglycemic; Lantus decreased to 18u on 9/6, then to 15u on 9/7. On 9/8 switched Lantus 15u to NPH 15u to prevent evening hypoglycemia in the setting of cyclic tube feeds; however pt still hyperglycemic w BG>200 in the AM.  9/11 NPH 15u evening and NPH 5u morning, and discontinued sliding scale insulin d/t pt's afternoon hypoglycemia. 9/12 discontinued morning NPH d/t patient's mid-morning hypoglycemia.     - Continue NPH 20 units at 6PM, to be given when initiating tube feeds.  - Discontinue NPH 5 units at 6AM  - BG checks q6h  - Hypoglycemia protocol in place

## 2024-09-12 NOTE — ASSESSMENT & PLAN NOTE
"CT neck no organic obstructions in the mediastinum. Pt has had poor PO intake, malnutrition requiring gastrostomy tube placement on 08/26. Per group home, pt attends day program which cannot manage tube feeds so requesting her to receive it from 6pm to 6am. Settings adjusted to take this into consideration per nutrition. Nutrition's recommendations to change tube feed from Glucerna to Nepro d/t lower potassium content of the latter.     Plan:  Per Nutrition: \"Nepro at 60ml/hr x12hrs with 125ml H2O flushes q 4hrs will provide 720ml, 1296kcal (30kcal/kg), 58g protein (1.4g/kg), 115g CHO, 1273ml H2O from TF formula and flushes (30ml/kg), ~683mg K (850mg K less than glucerna). calorie, protein and CHO content of nepro are similar glucerna 1.2.\"  Re-consult speech for swallow eval   "

## 2024-09-12 NOTE — PLAN OF CARE
Problem: PHYSICAL THERAPY ADULT  Goal: Performs mobility at highest level of function for planned discharge setting.  See evaluation for individualized goals.  Description: Treatment/Interventions: Functional transfer training, Patient/family training, Equipment eval/education, Bed mobility, Spoke to nursing, Spoke to case management, OT  Equipment Recommended: Wheelchair (anticipate pt likely has specialty WC, but no further information available.)       See flowsheet documentation for full assessment, interventions and recommendations.  Outcome: Completed  Note: Prognosis: Guarded  Problem List: Decreased strength, Decreased range of motion, Decreased endurance, Impaired balance, Decreased mobility, Decreased coordination, Decreased cognition, Impaired judgement, Decreased safety awareness, Impaired tone (limited command following, non verbal)  Assessment: Based on report from staff nurse and PTA, is is not clear if pt has been OOB other than w/ PT.   Care coordination w/ RN before, during and post session both for S and physical A if needed, as well as suggestions to promote Pt being OOB at least once a day by nursing staff. Pt now has drop arm recliner in room for ease of transfers/minimizing burden of care. Recommend nursing staff assist pt OOB to recliner either with sit pivot transfers (preferably to R side if possible) or use of adela lift. Recommend pt positioning in recliner w/ wedges @ side to facilitate upright sitting posture, and step stool under pt's feet to promote 90-90 positioning in pt w/ shorter stature. No further IPPT indicated at this time. Will reach out to charge nurse to promote daily nursing mobility as medically appropriate, and RN to pass on in her report.  Barriers to Discharge: Decreased caregiver support, Inaccessible home environment     Rehab Resource Intensity Level, PT: III (Minimum Resource Intensity) (Pt appears consistently at reported mobility baseline)    See flowsheet  documentation for full assessment.

## 2024-09-13 LAB
ANION GAP SERPL CALCULATED.3IONS-SCNC: 5 MMOL/L (ref 4–13)
BUN SERPL-MCNC: 34 MG/DL (ref 5–25)
CALCIUM SERPL-MCNC: 10.7 MG/DL (ref 8.4–10.2)
CHLORIDE SERPL-SCNC: 97 MMOL/L (ref 96–108)
CO2 SERPL-SCNC: 36 MMOL/L (ref 21–32)
CREAT SERPL-MCNC: 1.15 MG/DL (ref 0.6–1.3)
ERYTHROCYTE [DISTWIDTH] IN BLOOD BY AUTOMATED COUNT: 13.6 % (ref 11.6–15.1)
GFR SERPL CREATININE-BSD FRML MDRD: 51 ML/MIN/1.73SQ M
GLUCOSE SERPL-MCNC: 166 MG/DL (ref 65–140)
GLUCOSE SERPL-MCNC: 167 MG/DL (ref 65–140)
GLUCOSE SERPL-MCNC: 194 MG/DL (ref 65–140)
GLUCOSE SERPL-MCNC: 195 MG/DL (ref 65–140)
GLUCOSE SERPL-MCNC: 204 MG/DL (ref 65–140)
GLUCOSE SERPL-MCNC: 209 MG/DL (ref 65–140)
GLUCOSE SERPL-MCNC: 214 MG/DL (ref 65–140)
GLUCOSE SERPL-MCNC: 238 MG/DL (ref 65–140)
GLUCOSE SERPL-MCNC: 258 MG/DL (ref 65–140)
HCT VFR BLD AUTO: 29.1 % (ref 34.8–46.1)
HGB BLD-MCNC: 9.2 G/DL (ref 11.5–15.4)
MCH RBC QN AUTO: 33 PG (ref 26.8–34.3)
MCHC RBC AUTO-ENTMCNC: 32.2 G/DL (ref 31.4–37.4)
MCV RBC AUTO: 102 FL (ref 82–98)
PLATELET # BLD AUTO: 213 THOUSANDS/UL (ref 149–390)
PMV BLD AUTO: 11.9 FL (ref 8.9–12.7)
POTASSIUM SERPL-SCNC: 4.6 MMOL/L (ref 3.5–5.3)
RBC # BLD AUTO: 2.82 MILLION/UL (ref 3.81–5.12)
SODIUM SERPL-SCNC: 138 MMOL/L (ref 135–147)
WBC # BLD AUTO: 5.86 THOUSAND/UL (ref 4.31–10.16)

## 2024-09-13 PROCEDURE — 80048 BASIC METABOLIC PNL TOTAL CA: CPT

## 2024-09-13 PROCEDURE — NC001 PR NO CHARGE: Performed by: STUDENT IN AN ORGANIZED HEALTH CARE EDUCATION/TRAINING PROGRAM

## 2024-09-13 PROCEDURE — 99232 SBSQ HOSP IP/OBS MODERATE 35: CPT | Performed by: HOSPITALIST

## 2024-09-13 PROCEDURE — 82948 REAGENT STRIP/BLOOD GLUCOSE: CPT

## 2024-09-13 PROCEDURE — 85027 COMPLETE CBC AUTOMATED: CPT

## 2024-09-13 RX ADMIN — CITALOPRAM HYDROBROMIDE 40 MG: 20 TABLET ORAL at 08:14

## 2024-09-13 RX ADMIN — CALCIUM CARBONATE (ANTACID) CHEW TAB 500 MG 750 MG: 500 CHEW TAB at 08:14

## 2024-09-13 RX ADMIN — ENOXAPARIN SODIUM 40 MG: 40 INJECTION SUBCUTANEOUS at 08:25

## 2024-09-13 RX ADMIN — DOCOSANOL: 100 CREAM TOPICAL at 23:08

## 2024-09-13 RX ADMIN — DOCOSANOL: 100 CREAM TOPICAL at 18:26

## 2024-09-13 RX ADMIN — METHENAMINE HIPPURATE 1 G: 1 TABLET ORAL at 08:20

## 2024-09-13 RX ADMIN — VALPROIC ACID 125 MG: 250 SOLUTION ORAL at 08:22

## 2024-09-13 RX ADMIN — FERROUS SULFATE 300 MG: 300 SOLUTION ORAL at 18:25

## 2024-09-13 RX ADMIN — DOCOSANOL: 100 CREAM TOPICAL at 08:22

## 2024-09-13 RX ADMIN — Medication 20 MG: at 08:22

## 2024-09-13 RX ADMIN — OXYBUTYNIN CHLORIDE 5 MG: 5 TABLET ORAL at 18:30

## 2024-09-13 RX ADMIN — VALPROIC ACID 125 MG: 250 SOLUTION ORAL at 23:00

## 2024-09-13 RX ADMIN — CARBIDOPA AND LEVODOPA 1 TABLET: 25; 100 TABLET ORAL at 18:26

## 2024-09-13 RX ADMIN — OXYCODONE HYDROCHLORIDE AND ACETAMINOPHEN 1000 MG: 500 TABLET ORAL at 08:14

## 2024-09-13 RX ADMIN — DOCOSANOL: 100 CREAM TOPICAL at 12:08

## 2024-09-13 RX ADMIN — LORAZEPAM 0.5 MG: 0.5 TABLET ORAL at 23:00

## 2024-09-13 RX ADMIN — CARBIDOPA AND LEVODOPA 1 TABLET: 25; 100 TABLET ORAL at 23:00

## 2024-09-13 RX ADMIN — FERROUS SULFATE 300 MG: 300 SOLUTION ORAL at 08:22

## 2024-09-13 RX ADMIN — CALCIUM CARBONATE (ANTACID) CHEW TAB 500 MG 750 MG: 500 CHEW TAB at 23:00

## 2024-09-13 RX ADMIN — POLYETHYLENE GLYCOL 3350 17 G: 17 POWDER, FOR SOLUTION ORAL at 08:25

## 2024-09-13 RX ADMIN — BACLOFEN 10 MG: 10 TABLET ORAL at 18:26

## 2024-09-13 RX ADMIN — CARBIDOPA AND LEVODOPA 1 TABLET: 25; 100 TABLET ORAL at 08:14

## 2024-09-13 RX ADMIN — PRAVASTATIN SODIUM 40 MG: 40 TABLET ORAL at 18:26

## 2024-09-13 RX ADMIN — CARBIDOPA AND LEVODOPA 1 TABLET: 25; 100 TABLET ORAL at 12:08

## 2024-09-13 RX ADMIN — LEVOTHYROXINE SODIUM 25 MCG: 25 TABLET ORAL at 08:14

## 2024-09-13 RX ADMIN — MIDODRINE HYDROCHLORIDE 5 MG: 5 TABLET ORAL at 08:14

## 2024-09-13 RX ADMIN — Medication 1000 UNITS: at 08:14

## 2024-09-13 RX ADMIN — MIDODRINE HYDROCHLORIDE 5 MG: 5 TABLET ORAL at 18:29

## 2024-09-13 RX ADMIN — OXYBUTYNIN CHLORIDE 5 MG: 5 TABLET ORAL at 08:14

## 2024-09-13 RX ADMIN — MIDODRINE HYDROCHLORIDE 5 MG: 5 TABLET ORAL at 12:08

## 2024-09-13 RX ADMIN — METHENAMINE HIPPURATE 1 G: 1 TABLET ORAL at 18:31

## 2024-09-13 RX ADMIN — DOCOSANOL: 100 CREAM TOPICAL at 14:43

## 2024-09-13 RX ADMIN — BACLOFEN 10 MG: 10 TABLET ORAL at 23:00

## 2024-09-13 RX ADMIN — INSULIN HUMAN 20 UNITS: 100 INJECTION, SUSPENSION SUBCUTANEOUS at 18:38

## 2024-09-13 RX ADMIN — OXYBUTYNIN CHLORIDE 5 MG: 5 TABLET ORAL at 23:00

## 2024-09-13 RX ADMIN — BACLOFEN 10 MG: 10 TABLET ORAL at 08:14

## 2024-09-13 RX ADMIN — ARIPIPRAZOLE 2 MG: 2 TABLET ORAL at 23:08
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
## 2024-09-13 NOTE — ASSESSMENT & PLAN NOTE
Lab Results   Component Value Date    HGBA1C 6.2 (H) 08/13/2024     Recent Labs     09/13/24  0554 09/13/24  0809 09/13/24  1108 09/13/24  1431   POCGLU 258* 204* 238* 195*     Blood Sugar Average: Last 72 hrs:  (P) 147.0163670793985661    PMHx of diabetes. Pt having hyperglycemic episodes in the setting of tube feeds, possibly related to refeeding syndrome although pt has not had hypokalemia, hypomagnesemia, hypophosphatemia. Nutrition switched feed from Jevity 1.5 to Glucerna 1.2 for hyperglycemia, then to Nepro on 9/12 for hyperkalemia. Pt was doing well on 25u Lantus however became hypoglycemic; Lantus decreased to 18u on 9/6, then to 15u on 9/7. On 9/8 switched Lantus 15u to NPH 15u to prevent evening hypoglycemia in the setting of cyclic tube feeds; however pt still hyperglycemic w BG>200 in the AM.  9/11 NPH 15u evening and NPH 5u morning. 9/12 discontinued morning NPH d/t patient's mid-morning hypoglycemia. However pt became hypoglycemic after initiation of tube feeds last night, was given dextrose bolus with improvement. Will continue to monitor and see how pt tolerates this new feed.    - Continue NPH 20 units at 6PM, to be given when initiating tube feeds.  - BG checks q6h  - Hypoglycemia protocol in place

## 2024-09-13 NOTE — ASSESSMENT & PLAN NOTE
- Continue current medication regimen of Abilify 2 mg qhs, Celexa 40 mg once daily, Ativan 0.5 mg qhs, Depakene 125 mg q12 hours.  - No changes to psychotropic medications required at this time and no indication for inpatient psychiatric treatment as patient does not pose an imminent risk of harm to herself or others, patient is in good behavioral control and no concern for acute psychiatric symptoms.

## 2024-09-13 NOTE — CASE MANAGEMENT
Case Management Discharge Planning Note    Patient name Terrie Alicea  Location S /S -01 MRN [id number]  : 1960 Date 2024       Current Admission Date: 2024  Current Admission Diagnosis:Dysphagia   Patient Active Problem List    Diagnosis Date Noted Date Diagnosed    Mood disturbances 2024     Severe protein-calorie malnutrition (HCC) 2024     Functional quadriplegia ([facility]) 2024     Protein-calorie malnutrition, unspecified severity ([facility]) 2024     Hospital discharge follow-up 2024     SIRS (systemic inflammatory response syndrome) ([facility]) 2024     Developmental delay      Preop examination 2023     Cataract 2023     Internal hemorrhoids 2023     Contracture of right hand 2023     Need for shingles vaccine 2023     Viral illness 2022     Dystonic cerebral palsy ([facility]) 2022     Cervical dystonia 2022     Parkinson's disease 2022     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[facility]) 10/18/2021     Polyneuropathy associated with underlying disease ([facility]) 10/18/2021     Hypercalcemia 2021     Herpes zoster without complication 2021     Abnormal finding on lung imaging 2021     Anemia 2020     Eosinophilic leukocytosis 2020     Underweight 2020     Dysphagia 2020     History of vitamin D deficiency 2020     History of fall 10/28/2019     Breast asymmetry 2019     Hypernatremia 2018     Severe sepsis ([facility]) 2018     Gait disturbance 2018     Osteoarthritis of both hips 2018     Severe Intellectual disability 2018     DM (diabetes mellitus) ([facility]) 2018     Abnormal albumin 2017     Peripheral neuropathy 2017     Seasonal allergies 2017     Physical deconditioning 2017     Hyperlipidemia 2017     Gastroesophageal reflux disease 2017     Cerebral palsy ([facility]) 2017     Spasticity 2017     Ambulatory  dysfunction 03/27/2017     Bipolar disorder (HCC) 03/27/2017     Hypotension 04/11/2016     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/2016     Weight loss 01/21/2016     Osteoporosis 01/19/2016     Resting tremor 01/19/2016     Acute metabolic encephalopathy 12/11/2015     Gait abnormality 12/11/2015     Platelets decreased (HCC) 07/31/2015     Other chronic pain 07/30/2015     Postmenopausal vaginal bleeding 07/23/2015     Anxiety 07/15/2015     Menopause 09/25/2014     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/28/2014     Atopic dermatitis 06/02/2014     Dry eye 06/02/2014     Constipation 04/12/2013     Iron deficiency anemia 03/27/2013     Incontinence 03/27/2013     Hypothyroidism 01/10/2013       LOS (days): 20  Geometric Mean LOS (GMLOS) (days):   Days to GMLOS:     OBJECTIVE:  Risk of Unplanned Readmission Score: 40.41         Current admission status: Inpatient   Preferred Pharmacy:   [facility] - [address]  [address]  [address]  [place] [address]  Phone: [phone] Fax: [phone]    [facility]  [address]  [place] [address]  Phone: [phone] Fax: [phone]    [facility] [place] - [address]  [address]  [facility] [address]  Phone: [phone] Fax: [phone]    [facility]) - [place] - [address]  [address]  [facility] [address]  Phone: [phone] Fax: [phone]    Primary Care Provider: Gunnar Sylvester DO    Primary Insurance: [employer]  REP  Secondary Insurance: [place] [employer]    DISCHARGE DETAILS:    All requested clinical information emailed to Madelin Siddiqui  ([email or web address]) at [facility].  Pt is a [employer], requiring a level 2 passr for nursing home clinical eligibility.

## 2024-09-13 NOTE — ASSESSMENT & PLAN NOTE
Lab Results   Component Value Date    HGBA1C 6.2 (H) 08/13/2024       Recent Labs     09/13/24  0238 09/13/24  0356 09/13/24  0554 09/13/24  0809   POCGLU 194* 214* 258* 204*       Blood Sugar Average: Last 72 hrs:  (P) [id number]

## 2024-09-13 NOTE — PROGRESS NOTES
"Progress Note - Hospitalist   Name: Terrie Alicea 64 y.o. female I MRN: [id number]  Unit/Bed#: S -01 I Date of Admission: 8/24/2024   Date of Service: 9/13/2024 I Hospital Day: 20    Assessment & Plan  Dysphagia  CT neck no organic obstructions in the mediastinum. Pt has had poor PO intake, malnutrition requiring gastrostomy tube placement on 08/26. Per group home, pt attends day program which cannot manage tube feeds so requesting her to receive it from 6pm to 6am. Settings adjusted to take this into consideration per nutrition. Nutrition's recommendations to change tube feed from Glucerna to Nepro d/t lower potassium content of the latter.     Plan:  Per Nutrition:   \"Nepro at 60ml/hr x12hrs with 125ml H2O flushes q 4hrs will provide 720ml, 1296kcal (30kcal/kg), 58g protein (1.4g/kg), 115g CHO, 1273ml H2O from TF formula and flushes (30ml/kg), ~683mg K (850mg K less than glucerna). calorie, protein and CHO content of nepro are similar glucerna 1.2.\"  Cleared by speech for pleasure feeds, HTL  Despite extensive discussion and education regarding risk of aspiration, group home charge RN would like to trial pleasure feeds of pureed and honey thick liquids in the hospital to see if pt will tolerate it as pt was previously requesting food  DM (diabetes mellitus) ([facility])  Lab Results   Component Value Date    HGBA1C 6.2 (H) 08/13/2024     Recent Labs     09/13/24  0554 09/13/24  0809 09/13/24  1108 09/13/24  1431   POCGLU 258* 204* 238* 195*     Blood Sugar Average: Last 72 hrs:  (P) 147.4091418637834959    PMHx of diabetes. Pt having hyperglycemic episodes in the setting of tube feeds, possibly related to refeeding syndrome although pt has not had hypokalemia, hypomagnesemia, hypophosphatemia. Nutrition switched feed from Jevity 1.5 to Glucerna 1.2 for hyperglycemia, then to Nepro on 9/12 for hyperkalemia. Pt was doing well on 25u Lantus however became hypoglycemic; Lantus decreased to 18u on 9/6, then to 15u on " 9/7. On 9/8 switched Lantus 15u to NPH 15u to prevent evening hypoglycemia in the setting of cyclic tube feeds; however pt still hyperglycemic w BG>200 in the AM.  9/11 NPH 15u evening and NPH 5u morning. 9/12 discontinued morning NPH d/t patient's mid-morning hypoglycemia. However pt became hypoglycemic after initiation of tube feeds last night, was given dextrose bolus with improvement. Will continue to monitor and see how pt tolerates this new feed.    - Continue NPH 20 units at 6PM, to be given when initiating tube feeds.  - BG checks q6h  - Hypoglycemia protocol in place  Hypotension  Pt noted to be hypotensive intermittently throughout hospitalization. Per caregivers and record review, pt's BP tends to run on softer side. Pt's BP have been appropriate.      - Midodrine 5 mg TID  - Compression stockings  - VS q4h  - fluid intake per Nutrition, see plan under dysphagia  Cerebral palsy (HCC)  Continue baclofen 10 mg TID  Incontinence  Continue oxybutynin 5 mg TID  Parkinson's disease  History of mood disturbances, previously on neuroleptics. Tardive dyskinesia on exam.    Sinemet  qid. Can defer changes to outpatient neurology.   [facility]/clearance for rehab placement d/t a chart history of bipolar and IDD. Per psychiatry, pt is stable on current regimen, no indication for inpatient psychiatric treatment, and pt is not an imminent risk of harm to herself or others.     - Continue home meds: Abilify 2 mg, Citalopram 40 mg.  - Pt previously on trazodone 100 mg q HS which was discontinued on 8/30 while in the ICU d/t contributions to sedation.    - Continue Depakene oral solution 125 mg q12h for mood, can defer changes per outpatient psychiatry as deemed necessary.  - Pt has been stable from this standpoint throughout hospitalization, hasn't required restarting of trazodone or escalation in valproic acid thus far.     VTE Pharmacologic Prophylaxis: VTE  Score: 7 High Risk (Score >/= 5) - Pharmacological DVT Prophylaxis Ordered: enoxaparin (Lovenox). Sequential Compression Devices Ordered.    Mobility:   Basic Mobility Inpatient Raw Score: 6  JH-HLM Goal: 2: Bed activities/Dependent transfer  JH-HLM Achieved: 2: Bed activities/Dependent transfer  JH-HLM Goal achieved. Continue to encourage appropriate mobility.    Patient Centered Rounds: I performed bedside rounds with nursing staff today.   Discussions with Specialists or Other Care Team Provider: psychiatry, nutrition    Education and Discussions with Family / Patient: Attending updated caregiver at bedside.    Current Length of Stay: 20 day(s)  Current Patient Status: Inpatient   Discharge Plan:  TBD, pending placement    Code Status: Level 1 - Full Code    Subjective   Pt is seen and examined at bedside. Per report, pt became hypoglycemic in the evening and was given a dextrose bolus, resolved. This morning she is sleeping in bed, normal respirations observed. She opens her eyes to verbal stimuli. Does not appear to be in any acute distress. Physical examination unchanged.    Objective     Vitals:   Temp (24hrs), Av.6 °F (37 °C), Min:97.7 °F (36.5 °C), Max:99.9 °F (37.7 °C)    Temp:  [97.7 °F (36.5 °C)-99.9 °F (37.7 °C)] 98.6 °F (37 °C)  HR:  [54-89] 59  Resp:  [12] 12  BP: ()/(49-68) 90/56  SpO2:  [92 %-96 %] 92 %  Body mass index is 20.69 kg/m².     Input and Output Summary (last 24 hours):     Intake/Output Summary (Last 24 hours) at 2024 0389  Last data filed at 2024 1300  Gross per 24 hour   Intake 480 ml   Output --   Net 480 ml       Physical Exam  Vitals reviewed.   Constitutional:       General: She is not in acute distress.     Appearance: She is not toxic-appearing.   HENT:      Head: Normocephalic and atraumatic.      Right Ear: External ear normal.      Left Ear: External ear normal.      Nose: Nose normal.      Mouth/Throat:      Pharynx: No oropharyngeal exudate or posterior  oropharyngeal erythema.      Comments: Orofacial dyskinesia (chronic)  Eyes:      Extraocular Movements: Extraocular movements intact.   Cardiovascular:      Rate and Rhythm: Normal rate and regular rhythm.      Pulses: Normal pulses.      Heart sounds: Normal heart sounds.   Pulmonary:      Effort: Pulmonary effort is normal. No respiratory distress.   Abdominal:      General: Bowel sounds are normal. There is no distension.      Tenderness: There is no guarding.   Musculoskeletal:         General: Deformity (chronic) present.   Skin:     General: Skin is warm and dry.      Capillary Refill: Capillary refill takes less than 2 seconds.      Coloration: Skin is not jaundiced or pale.   Neurological:      Mental Status: She is alert. Mental status is at baseline.          Lines/Drains:  Lines/Drains/Airways       Active Status       Name Placement date Placement time Site Days    Gastrostomy/Enterostomy  16 Fr. LUQ 08/26/24  1643  LUQ  18                            Lab Results: I have reviewed the following results:  Results from last 7 days   Lab Units 09/13/24  1309   WBC Thousand/uL 5.86   HEMOGLOBIN g/dL 9.2*   HEMATOCRIT % 29.1*   PLATELETS Thousands/uL 213     Results from last 7 days   Lab Units 09/13/24  1309   SODIUM mmol/L 138   POTASSIUM mmol/L 4.6   CHLORIDE mmol/L 97   CO2 mmol/L 36*   BUN mg/dL 34*   CREATININE mg/dL 1.15   ANION GAP mmol/L 5   CALCIUM mg/dL 10.7*   GLUCOSE RANDOM mg/dL 209*         Results from last 7 days   Lab Units 09/13/24  1431 09/13/24  1108 09/13/24  0809 09/13/24  0554 09/13/24  0356 09/13/24  0238 09/13/24  0102 09/12/24  2336 09/12/24  2311 09/12/24  2222 09/12/24  1845 09/12/24  1717   POC GLUCOSE mg/dl 195* 238* 204* 258* 214* 194* 166* 153* 208* 56* 194* 71               Recent Cultures (last 7 days):         Imaging Review: Reviewed radiology reports from this admission including: chest xray and CT abdomen/pelvis.    Last 24 Hours Medication List:     Current  Facility-Administered Medications:     acetaminophen (Ofirmev) IVPB 630 mg, Q8H PRN, Last Rate: 630 mg (09/03/24 0038)    ammonium lactate (LAC-HYDRIN) 12 % cream, BID PRN    ARIPiprazole (ABILIFY) tablet 2 mg, HS    artificial tear ophthalmic ointment, HS PRN    ascorbic acid (VITAMIN C) tablet 1,000 mg, Daily    baclofen tablet 10 mg, TID    calcium carbonate (TUMS) chewable tablet 750 mg, BID    carbamide peroxide (DEBROX) 6.5 % otic solution 2 drop, BID PRN    carbidopa-levodopa (SINEMET)  mg per tablet 1 tablet, 4x Daily    Cholecalciferol (VITAMIN D3) tablet 1,000 Units, Daily    citalopram (CeleXA) tablet 40 mg, Daily    dextromethorphan-guaiFENesin (ROBITUSSIN DM) oral syrup 10 mL, Q4H PRN    docosanol (ABREVA) 10 % cream, 5x Daily    enoxaparin (LOVENOX) subcutaneous injection 40 mg, Daily    Ferrous Sulfate oral syrup 300 mg, BID AC    fluticasone (FLONASE) 50 mcg/act nasal spray 1 spray, Daily PRN    hydrocortisone 1 % cream, 4x Daily PRN    insulin NPH (HumuLIN N,NovoLIN N) 100 Units/mL subcutaneous injection 20 Units, HS    Ketotifen Fumarate (ZADITOR) 0.035 % ophthalmic solution 1 drop, Daily PRN    levothyroxine tablet 25 mcg, QAM    Loratadine (CLARITIN) oral soln 10 mg, Daily PRN    LORazepam (ATIVAN) tablet 0.5 mg, HS    methenamine hippurate (HIPREX) tablet 1 g, BID    midodrine (PROAMATINE) tablet 5 mg, TID AC    omeprazole (PRILOSEC) suspension 2 mg/mL, Daily    oxybutynin (DITROPAN) tablet 5 mg, TID    polyethylene glycol (MIRALAX) packet 17 g, Daily    pravastatin (PRAVACHOL) tablet 40 mg, Daily With Dinner    valproic acid (DEPAKENE) oral soln 125 mg, Q12H ROSEMARY    Administrative Statements   Today, Patient Was Seen By: Silvana Mike DO  PGY-1      **Please Note: This note may have been constructed using a voice recognition system.**

## 2024-09-13 NOTE — ASSESSMENT & PLAN NOTE
[facility] is requesting psychiatric evaluation/clearance for rehab placement d/t a chart history of bipolar and IDD. Per psychiatry, pt is stable on current regimen, no indication for inpatient psychiatric treatment, and pt is not an imminent risk of harm to herself or others.     - Continue home meds: Abilify 2 mg, Citalopram 40 mg.  - Pt previously on trazodone 100 mg q HS which was discontinued on 8/30 while in the ICU d/t contributions to sedation.    - Continue Depakene oral solution 125 mg q12h for mood, can defer changes per outpatient psychiatry as deemed necessary.  - Pt has been stable from this standpoint throughout hospitalization, hasn't required restarting of trazodone or escalation in valproic acid thus far.

## 2024-09-14 LAB
ANION GAP SERPL CALCULATED.3IONS-SCNC: 3 MMOL/L (ref 4–13)
BUN SERPL-MCNC: 35 MG/DL (ref 5–25)
CALCIUM SERPL-MCNC: 10.7 MG/DL (ref 8.4–10.2)
CHLORIDE SERPL-SCNC: 99 MMOL/L (ref 96–108)
CO2 SERPL-SCNC: 38 MMOL/L (ref 21–32)
CREAT SERPL-MCNC: 1.13 MG/DL (ref 0.6–1.3)
ERYTHROCYTE [DISTWIDTH] IN BLOOD BY AUTOMATED COUNT: 13.4 % (ref 11.6–15.1)
GFR SERPL CREATININE-BSD FRML MDRD: 52 ML/MIN/1.73SQ M
GLUCOSE SERPL-MCNC: 109 MG/DL (ref 65–140)
GLUCOSE SERPL-MCNC: 193 MG/DL (ref 65–140)
GLUCOSE SERPL-MCNC: 201 MG/DL (ref 65–140)
GLUCOSE SERPL-MCNC: 224 MG/DL (ref 65–140)
GLUCOSE SERPL-MCNC: 246 MG/DL (ref 65–140)
GLUCOSE SERPL-MCNC: 328 MG/DL (ref 65–140)
HCT VFR BLD AUTO: 29 % (ref 34.8–46.1)
HGB BLD-MCNC: 9.1 G/DL (ref 11.5–15.4)
MCH RBC QN AUTO: 32.7 PG (ref 26.8–34.3)
MCHC RBC AUTO-ENTMCNC: 31.4 G/DL (ref 31.4–37.4)
MCV RBC AUTO: 104 FL (ref 82–98)
PLATELET # BLD AUTO: 212 THOUSANDS/UL (ref 149–390)
PMV BLD AUTO: 12.4 FL (ref 8.9–12.7)
POTASSIUM SERPL-SCNC: 4.8 MMOL/L (ref 3.5–5.3)
RBC # BLD AUTO: 2.78 MILLION/UL (ref 3.81–5.12)
SODIUM SERPL-SCNC: 140 MMOL/L (ref 135–147)
WBC # BLD AUTO: 5.96 THOUSAND/UL (ref 4.31–10.16)

## 2024-09-14 PROCEDURE — 85027 COMPLETE CBC AUTOMATED: CPT

## 2024-09-14 PROCEDURE — 82948 REAGENT STRIP/BLOOD GLUCOSE: CPT

## 2024-09-14 PROCEDURE — 99232 SBSQ HOSP IP/OBS MODERATE 35: CPT | Performed by: INTERNAL MEDICINE

## 2024-09-14 PROCEDURE — 80048 BASIC METABOLIC PNL TOTAL CA: CPT

## 2024-09-14 RX ORDER — INSULIN LISPRO 100 [IU]/ML
1-5 INJECTION, SOLUTION INTRAVENOUS; SUBCUTANEOUS EVERY 6 HOURS SCHEDULED
Status: DISCONTINUED | OUTPATIENT
Start: 2024-09-14 | End: 2024-09-15

## 2024-09-14 RX ADMIN — POLYETHYLENE GLYCOL 3350 17 G: 17 POWDER, FOR SOLUTION ORAL at 10:22

## 2024-09-14 RX ADMIN — CARBIDOPA AND LEVODOPA 1 TABLET: 25; 100 TABLET ORAL at 10:22

## 2024-09-14 RX ADMIN — PRAVASTATIN SODIUM 40 MG: 40 TABLET ORAL at 18:31

## 2024-09-14 RX ADMIN — BACLOFEN 10 MG: 10 TABLET ORAL at 18:31

## 2024-09-14 RX ADMIN — DOCOSANOL: 100 CREAM TOPICAL at 22:13

## 2024-09-14 RX ADMIN — OXYBUTYNIN CHLORIDE 5 MG: 5 TABLET ORAL at 18:31

## 2024-09-14 RX ADMIN — METHENAMINE HIPPURATE 1 G: 1 TABLET ORAL at 10:25

## 2024-09-14 RX ADMIN — METHENAMINE HIPPURATE 1 G: 1 TABLET ORAL at 18:32

## 2024-09-14 RX ADMIN — DOCOSANOL: 100 CREAM TOPICAL at 10:25

## 2024-09-14 RX ADMIN — ENOXAPARIN SODIUM 40 MG: 40 INJECTION SUBCUTANEOUS at 10:22

## 2024-09-14 RX ADMIN — Medication 1000 UNITS: at 10:22

## 2024-09-14 RX ADMIN — OXYBUTYNIN CHLORIDE 5 MG: 5 TABLET ORAL at 10:22

## 2024-09-14 RX ADMIN — CARBIDOPA AND LEVODOPA 1 TABLET: 25; 100 TABLET ORAL at 18:31

## 2024-09-14 RX ADMIN — ARIPIPRAZOLE 2 MG: 2 TABLET ORAL at 22:14

## 2024-09-14 RX ADMIN — INSULIN LISPRO 3 UNITS: 100 INJECTION, SOLUTION INTRAVENOUS; SUBCUTANEOUS at 13:22

## 2024-09-14 RX ADMIN — MIDODRINE HYDROCHLORIDE 5 MG: 5 TABLET ORAL at 06:34

## 2024-09-14 RX ADMIN — Medication 20 MG: at 10:24

## 2024-09-14 RX ADMIN — CITALOPRAM HYDROBROMIDE 40 MG: 20 TABLET ORAL at 10:22

## 2024-09-14 RX ADMIN — Medication 630 MG: at 14:05

## 2024-09-14 RX ADMIN — BACLOFEN 10 MG: 10 TABLET ORAL at 22:13

## 2024-09-14 RX ADMIN — FERROUS SULFATE 300 MG: 300 SOLUTION ORAL at 06:34

## 2024-09-14 RX ADMIN — OXYCODONE HYDROCHLORIDE AND ACETAMINOPHEN 1000 MG: 500 TABLET ORAL at 10:22

## 2024-09-14 RX ADMIN — FERROUS SULFATE 300 MG: 300 SOLUTION ORAL at 18:31

## 2024-09-14 RX ADMIN — MIDODRINE HYDROCHLORIDE 5 MG: 5 TABLET ORAL at 12:47

## 2024-09-14 RX ADMIN — MIDODRINE HYDROCHLORIDE 5 MG: 5 TABLET ORAL at 18:31

## 2024-09-14 RX ADMIN — BACLOFEN 10 MG: 10 TABLET ORAL at 10:22

## 2024-09-14 RX ADMIN — LEVOTHYROXINE SODIUM 25 MCG: 25 TABLET ORAL at 10:22

## 2024-09-14 RX ADMIN — CALCIUM CARBONATE (ANTACID) CHEW TAB 500 MG 750 MG: 500 CHEW TAB at 10:22

## 2024-09-14 RX ADMIN — INSULIN HUMAN 20 UNITS: 100 INJECTION, SUSPENSION SUBCUTANEOUS at 18:33

## 2024-09-14 RX ADMIN — CALCIUM CARBONATE (ANTACID) CHEW TAB 500 MG 750 MG: 500 CHEW TAB at 22:13

## 2024-09-14 RX ADMIN — DOCOSANOL: 100 CREAM TOPICAL at 06:35

## 2024-09-14 RX ADMIN — VALPROIC ACID 125 MG: 250 SOLUTION ORAL at 22:13

## 2024-09-14 RX ADMIN — CARBIDOPA AND LEVODOPA 1 TABLET: 25; 100 TABLET ORAL at 22:13

## 2024-09-14 RX ADMIN — VALPROIC ACID 125 MG: 250 SOLUTION ORAL at 10:22

## 2024-09-14 RX ADMIN — DOCOSANOL: 100 CREAM TOPICAL at 18:32

## 2024-09-14 RX ADMIN — OXYBUTYNIN CHLORIDE 5 MG: 5 TABLET ORAL at 22:13

## 2024-09-14 RX ADMIN — DOCOSANOL: 100 CREAM TOPICAL at 14:13

## 2024-09-14 RX ADMIN — LORAZEPAM 0.5 MG: 0.5 TABLET ORAL at 22:13

## 2024-09-14 NOTE — ASSESSMENT & PLAN NOTE
Lab Results   Component Value Date    HGBA1C 6.2 (H) 08/13/2024     Recent Labs     09/13/24  1837 09/14/24  0020 09/14/24  0658 09/14/24  1242   POCGLU 167* 109 246* 328*     Blood Sugar Average: Last 72 hrs:  (P) 155.4288064315137248    PMHx of diabetes. Pt having hyperglycemic episodes in the setting of tube feeds, possibly related to refeeding syndrome although pt has not had hypokalemia, hypomagnesemia, hypophosphatemia. Nutrition switched feed from Jevity 1.5 to Glucerna 1.2 for hyperglycemia, then to Nepro on 9/12 for hyperkalemia. Pt was doing well on 25u Lantus however became hypoglycemic; Lantus decreased to 18u on 9/6, then to 15u on 9/7. On 9/8 switched Lantus 15u to NPH 15u to prevent evening hypoglycemia in the setting of cyclic tube feeds; however pt still hyperglycemic w BG>200 in the AM.  9/11 NPH 15u evening and NPH 5u morning. 9/12 discontinued morning NPH d/t patient's mid-morning hypoglycemia. However pt became hypoglycemic after initiation of tube feeds last night, was given dextrose bolus with improvement. Will continue to monitor and see how pt tolerates this new feed.    - Continue NPH 20 units at 6PM, to be given when initiating tube feeds.  Add sliding scale insulin with blood glucose checks every 6h to further assess insulin requirement  - Hypoglycemia protocol in place

## 2024-09-14 NOTE — ASSESSMENT & PLAN NOTE
[facility] is requesting psychiatric evaluation/clearance for rehab placement d/t a chart history of bipolar and IDD. Per psychiatry, pt is stable on current regimen, no indication for inpatient psychiatric treatment, and pt is not an imminent risk of harm to herself or others.     - Continue home meds: Abilify 2 mg, Citalopram 40 mg.  - Pt previously on trazodone 100 mg q HS which was discontinued on 8/30 while in the ICU d/t contributions to sedation.    - Continue Depakene oral solution 125 mg q12h for mood, can defer changes per outpatient psychiatry as deemed necessary.  - Pt has been stable from this standpoint throughout hospitalization, hasn't required restarting of trazodone or escalation in valproic acid thus far.  Per psychiatry, cleared for clearance to return back to [facility] home

## 2024-09-14 NOTE — QUICK NOTE
Updated patient's caregiver, Lisa, over the phone regarding plan of care.  Answered all questions and concerns.

## 2024-09-14 NOTE — PROGRESS NOTES
"Progress Note - Hospitalist   Name: Terrie Alicea 64 y.o. female I MRN: [id number]  Unit/Bed#: S -01 I Date of Admission: 8/24/2024   Date of Service: 9/14/2024 I Hospital Day: 21    Assessment & Plan  Dysphagia  CT neck no organic obstructions in the mediastinum. Pt has had poor PO intake, malnutrition requiring gastrostomy tube placement on 08/26. Per group home, pt attends day program which cannot manage tube feeds so requesting her to receive it from 6pm to 6am. Settings adjusted to take this into consideration per nutrition. Nutrition's recommendations to change tube feed from Glucerna to Nepro d/t lower potassium content of the latter.     Plan:  Per Nutrition:   \"Nepro at 60ml/hr x12hrs with 125ml H2O flushes q 4hrs will provide 720ml, 1296kcal (30kcal/kg), 58g protein (1.4g/kg), 115g CHO, 1273ml H2O from TF formula and flushes (30ml/kg), ~683mg K (850mg K less than glucerna). calorie, protein and CHO content of nepro are similar glucerna 1.2.\"  Cleared by speech for pleasure feeds, HTL  Despite extensive discussion and education regarding risk of aspiration, group home charge RN would like to trial pleasure feeds of pureed and honey thick liquids in the hospital to see if pt will tolerate it as pt was previously requesting food  Touch base with patient's POA regarding this decision  DM (diabetes mellitus) ([facility])  Lab Results   Component Value Date    HGBA1C 6.2 (H) 08/13/2024     Recent Labs     09/13/24  1837 09/14/24  0020 09/14/24  0658 09/14/24  1242   POCGLU 167* 109 246* 328*     Blood Sugar Average: Last 72 hrs:  (P) 155.6920464214421485    PMHx of diabetes. Pt having hyperglycemic episodes in the setting of tube feeds, possibly related to refeeding syndrome although pt has not had hypokalemia, hypomagnesemia, hypophosphatemia. Nutrition switched feed from Jevity 1.5 to Glucerna 1.2 for hyperglycemia, then to Nepro on 9/12 for hyperkalemia. Pt was doing well on 25u Lantus however became " hypoglycemic; Lantus decreased to 18u on 9/6, then to 15u on 9/7. On 9/8 switched Lantus 15u to NPH 15u to prevent evening hypoglycemia in the setting of cyclic tube feeds; however pt still hyperglycemic w BG>200 in the AM.  9/11 NPH 15u evening and NPH 5u morning. 9/12 discontinued morning NPH d/t patient's mid-morning hypoglycemia. However pt became hypoglycemic after initiation of tube feeds last night, was given dextrose bolus with improvement. Will continue to monitor and see how pt tolerates this new feed.    - Continue NPH 20 units at 6PM, to be given when initiating tube feeds.  Add sliding scale insulin with blood glucose checks every 6h to further assess insulin requirement  - Hypoglycemia protocol in place  Hypotension  Pt noted to be hypotensive intermittently throughout hospitalization. Per caregivers and record review, pt's BP tends to run on softer side. Pt's BP have been appropriate.      - Midodrine 5 mg TID  - Compression stockings  - VS q4h  - fluid intake per Nutrition, see plan under dysphagia  Cerebral palsy (HCC)  Continue baclofen 10 mg TID  Incontinence  Continue oxybutynin 5 mg TID  Parkinson's disease  History of mood disturbances, previously on neuroleptics. Tardive dyskinesia on exam.    Sinemet  qid. Can defer changes to outpatient neurology.   Mood disturbances  [facility] is requesting psychiatric evaluation/clearance for rehab placement d/t a chart history of bipolar and IDD. Per psychiatry, pt is stable on current regimen, no indication for inpatient psychiatric treatment, and pt is not an imminent risk of harm to herself or others.     - Continue home meds: Abilify 2 mg, Citalopram 40 mg.  - Pt previously on trazodone 100 mg q HS which was discontinued on 8/30 while in the ICU d/t contributions to sedation.    - Continue Depakene oral solution 125 mg q12h for mood, can defer changes per outpatient psychiatry as deemed necessary.  - Pt has been stable from this standpoint  throughout hospitalization, hasn't required restarting of trazodone or escalation in valproic acid thus far.  Per psychiatry, cleared for clearance to return back to group home    VTE Pharmacologic Prophylaxis: VTE Score: 7 High Risk (Score >/= 5) - Pharmacological DVT Prophylaxis Ordered: enoxaparin (Lovenox). Sequential Compression Devices Ordered.    Mobility:   Basic Mobility Inpatient Raw Score: 6  JH-HLM Goal: 2: Bed activities/Dependent transfer  JH-HLM Achieved: 4: Move to chair/commode  JH-HLM Goal achieved. Continue to encourage appropriate mobility.    Patient Centered Rounds: I performed bedside rounds with nursing staff today.   Discussions with Specialists or Other Care Team Provider: General Surgery, nutrition services, psychiatry    Education and Discussions with Family / Patient:  Will update family.     Current Length of Stay: 21 day(s)  Current Patient Status: Inpatient   Certification Statement: The patient will continue to require additional inpatient hospital stay due to hypoglycemia  Discharge Plan: Anticipate discharge in 24-48 hrs to group home.    Code Status: Level 1 - Full Code    Subjective   Patient was seen and examined at bedside.  Patient is nonverbal, noninteractive.  She opens eyes and does not track.    Objective     Vitals:   Temp (24hrs), Av.8 °F (37.1 °C), Min:98.1 °F (36.7 °C), Max:99.6 °F (37.6 °C)    Temp:  [98.1 °F (36.7 °C)-99.6 °F (37.6 °C)] 99.6 °F (37.6 °C)  HR:  [49-79] 70  Resp:  [18] 18  BP: ()/() 136/115  SpO2:  [92 %-96 %] 96 %  Body mass index is 19.63 kg/m².     Input and Output Summary (last 24 hours):     Intake/Output Summary (Last 24 hours) at 2024 1251  Last data filed at 2024 0601  Gross per 24 hour   Intake 1345 ml   Output --   Net 1345 ml       Physical Exam  Constitutional:       Appearance: Normal appearance.   HENT:      Head: Normocephalic and atraumatic.      Mouth/Throat:      Comments: Dystonic orofacial  movements  Cardiovascular:      Rate and Rhythm: Normal rate and regular rhythm.      Heart sounds: No murmur heard.  Pulmonary:      Breath sounds: No wheezing, rhonchi or rales.   Chest:      Chest wall: No tenderness.   Abdominal:      General: Bowel sounds are normal.      Palpations: Abdomen is soft.      Tenderness: There is no abdominal tenderness. There is no guarding or rebound.   Musculoskeletal:      Cervical back: Normal range of motion.      Right lower leg: No edema.      Left lower leg: No edema.   Neurological:      Mental Status: She is alert.      Comments: Opens eyes, not interactive, nonverbal, does not track.          Lines/Drains:  Lines/Drains/Airways       Active Status       Name Placement date Placement time Site Days    Gastrostomy/Enterostomy  16 Fr. LUQ 08/26/24  1643  LUQ  18                        Lab Results: I have reviewed the following results:    Results from last 7 days   Lab Units 09/14/24  0542   WBC Thousand/uL 5.96   HEMOGLOBIN g/dL 9.1*   HEMATOCRIT % 29.0*   PLATELETS Thousands/uL 212     Results from last 7 days   Lab Units 09/14/24  0542   SODIUM mmol/L 140   POTASSIUM mmol/L 4.8   CHLORIDE mmol/L 99   CO2 mmol/L 38*   BUN mg/dL 35*   CREATININE mg/dL 1.13   ANION GAP mmol/L 3*   CALCIUM mg/dL 10.7*   GLUCOSE RANDOM mg/dL 224*         Results from last 7 days   Lab Units 09/14/24  1242 09/14/24  0658 09/14/24  0020 09/13/24  1837 09/13/24  1431 09/13/24  1108 09/13/24  0809 09/13/24  0554 09/13/24  0356 09/13/24  0238 09/13/24  0102 09/12/24  2336   POC GLUCOSE mg/dl 328* 246* 109 167* 195* 238* 204* 258* 214* 194* 166* 153*               Recent Cultures (last 7 days):         Imaging Review: Reviewed radiology reports from this admission including: chest xray, CT chest, CT abdomen/pelvis, and CT soft tissue neck.  Other Studies: EKG was reviewed.     Last 24 Hours Medication List:     Current Facility-Administered Medications:     acetaminophen (Ofirmev) IVPB 630 mg, Q8H  PRN, Last Rate: 630 mg (09/03/24 0038)    ammonium lactate (LAC-HYDRIN) 12 % cream, BID PRN    ARIPiprazole (ABILIFY) tablet 2 mg, HS    artificial tear ophthalmic ointment, HS PRN    ascorbic acid (VITAMIN C) tablet 1,000 mg, Daily    baclofen tablet 10 mg, TID    calcium carbonate (TUMS) chewable tablet 750 mg, BID    carbamide peroxide (DEBROX) 6.5 % otic solution 2 drop, BID PRN    carbidopa-levodopa (SINEMET)  mg per tablet 1 tablet, 4x Daily    Cholecalciferol (VITAMIN D3) tablet 1,000 Units, Daily    citalopram (CeleXA) tablet 40 mg, Daily    dextromethorphan-guaiFENesin (ROBITUSSIN DM) oral syrup 10 mL, Q4H PRN    docosanol (ABREVA) 10 % cream, 5x Daily    enoxaparin (LOVENOX) subcutaneous injection 40 mg, Daily    Ferrous Sulfate oral syrup 300 mg, BID AC    fluticasone (FLONASE) 50 mcg/act nasal spray 1 spray, Daily PRN    hydrocortisone 1 % cream, 4x Daily PRN    insulin lispro (HumALOG/ADMELOG) 100 units/mL subcutaneous injection 1-5 Units, Q6H ROSEMARY **AND** Fingerstick Glucose (POCT), Q6H    insulin NPH (HumuLIN N,NovoLIN N) 100 Units/mL subcutaneous injection 20 Units, HS    Ketotifen Fumarate (ZADITOR) 0.035 % ophthalmic solution 1 drop, Daily PRN    levothyroxine tablet 25 mcg, QAM    Loratadine (CLARITIN) oral soln 10 mg, Daily PRN    LORazepam (ATIVAN) tablet 0.5 mg, HS    methenamine hippurate (HIPREX) tablet 1 g, BID    midodrine (PROAMATINE) tablet 5 mg, TID AC    omeprazole (PRILOSEC) suspension 2 mg/mL, Daily    oxybutynin (DITROPAN) tablet 5 mg, TID    polyethylene glycol (MIRALAX) packet 17 g, Daily    pravastatin (PRAVACHOL) tablet 40 mg, Daily With Dinner    valproic acid (DEPAKENE) oral soln 125 mg, Q12H ROSEMARY    Administrative Statements   Today, Patient Was Seen By: Shreyan Kay, DO      **Please Note: This note may have been constructed using a voice recognition system.**

## 2024-09-14 NOTE — QUICK NOTE
Spoke with patient's caregiver, Lisa, at bedside.  She would only like honey thick liquids to be given as pleasure feeds, not dysphagia 1 diet.  She does not want patient to accidentally get any other foods.  She does not want any of our staff to feed patient.  She would like her staff to only feed the patient.  Caregiver is aware of risk of aspiration pneumonia and would like to continue with pleasure feeds.

## 2024-09-14 NOTE — ASSESSMENT & PLAN NOTE
"CT neck no organic obstructions in the mediastinum. Pt has had poor PO intake, malnutrition requiring gastrostomy tube placement on 08/26. Per group home, pt attends day program which cannot manage tube feeds so requesting her to receive it from 6pm to 6am. Settings adjusted to take this into consideration per nutrition. Nutrition's recommendations to change tube feed from Glucerna to Nepro d/t lower potassium content of the latter.     Plan:  Per Nutrition:   \"Nepro at 60ml/hr x12hrs with 125ml H2O flushes q 4hrs will provide 720ml, 1296kcal (30kcal/kg), 58g protein (1.4g/kg), 115g CHO, 1273ml H2O from TF formula and flushes (30ml/kg), ~683mg K (850mg K less than glucerna). calorie, protein and CHO content of nepro are similar glucerna 1.2.\"  Cleared by speech for pleasure feeds, HTL  Despite extensive discussion and education regarding risk of aspiration, group home charge RN would like to trial pleasure feeds of pureed and honey thick liquids in the hospital to see if pt will tolerate it as pt was previously requesting food  Touch base with patient's POA regarding this decision  "

## 2024-09-15 ENCOUNTER — APPOINTMENT (INPATIENT)
Dept: RADIOLOGY | Facility: HOSPITAL | Age: 64
DRG: 391 | End: 2024-09-15
Payer: MEDICARE

## 2024-09-15 PROBLEM — R50.9 FEVER OF UNKNOWN ORIGIN: Status: ACTIVE | Noted: 2024-09-15

## 2024-09-15 LAB
ANION GAP SERPL CALCULATED.3IONS-SCNC: 10 MMOL/L (ref 4–13)
BASOPHILS # BLD AUTO: 0.03 THOUSANDS/ΜL (ref 0–0.1)
BASOPHILS NFR BLD AUTO: 1 % (ref 0–1)
BUN SERPL-MCNC: 32 MG/DL (ref 5–25)
CALCIUM SERPL-MCNC: 10.7 MG/DL (ref 8.4–10.2)
CHLORIDE SERPL-SCNC: 98 MMOL/L (ref 96–108)
CO2 SERPL-SCNC: 29 MMOL/L (ref 21–32)
CREAT SERPL-MCNC: 1.06 MG/DL (ref 0.6–1.3)
EOSINOPHIL # BLD AUTO: 0.21 THOUSAND/ΜL (ref 0–0.61)
EOSINOPHIL NFR BLD AUTO: 3 % (ref 0–6)
ERYTHROCYTE [DISTWIDTH] IN BLOOD BY AUTOMATED COUNT: 13.5 % (ref 11.6–15.1)
GFR SERPL CREATININE-BSD FRML MDRD: 56 ML/MIN/1.73SQ M
GLUCOSE SERPL-MCNC: 126 MG/DL (ref 65–140)
GLUCOSE SERPL-MCNC: 179 MG/DL (ref 65–140)
GLUCOSE SERPL-MCNC: 183 MG/DL (ref 65–140)
GLUCOSE SERPL-MCNC: 242 MG/DL (ref 65–140)
GLUCOSE SERPL-MCNC: 274 MG/DL (ref 65–140)
GLUCOSE SERPL-MCNC: 289 MG/DL (ref 65–140)
GLUCOSE SERPL-MCNC: 45 MG/DL (ref 65–140)
GLUCOSE SERPL-MCNC: 48 MG/DL (ref 65–140)
HCT VFR BLD AUTO: 31.5 % (ref 34.8–46.1)
HGB BLD-MCNC: 9.9 G/DL (ref 11.5–15.4)
IMM GRANULOCYTES # BLD AUTO: 0.01 THOUSAND/UL (ref 0–0.2)
IMM GRANULOCYTES NFR BLD AUTO: 0 % (ref 0–2)
LYMPHOCYTES # BLD AUTO: 1.45 THOUSANDS/ΜL (ref 0.6–4.47)
LYMPHOCYTES NFR BLD AUTO: 23 % (ref 14–44)
MCH RBC QN AUTO: 32.6 PG (ref 26.8–34.3)
MCHC RBC AUTO-ENTMCNC: 31.4 G/DL (ref 31.4–37.4)
MCV RBC AUTO: 104 FL (ref 82–98)
MONOCYTES # BLD AUTO: 0.52 THOUSAND/ΜL (ref 0.17–1.22)
MONOCYTES NFR BLD AUTO: 8 % (ref 4–12)
NEUTROPHILS # BLD AUTO: 4.22 THOUSANDS/ΜL (ref 1.85–7.62)
NEUTS SEG NFR BLD AUTO: 65 % (ref 43–75)
NRBC BLD AUTO-RTO: 0 /100 WBCS
PLATELET # BLD AUTO: 198 THOUSANDS/UL (ref 149–390)
PMV BLD AUTO: 12.2 FL (ref 8.9–12.7)
POTASSIUM SERPL-SCNC: 4.5 MMOL/L (ref 3.5–5.3)
PROCALCITONIN SERPL-MCNC: 0.11 NG/ML
RBC # BLD AUTO: 3.04 MILLION/UL (ref 3.81–5.12)
SODIUM SERPL-SCNC: 137 MMOL/L (ref 135–147)
WBC # BLD AUTO: 6.44 THOUSAND/UL (ref 4.31–10.16)

## 2024-09-15 PROCEDURE — 85025 COMPLETE CBC W/AUTO DIFF WBC: CPT

## 2024-09-15 PROCEDURE — 84145 PROCALCITONIN (PCT): CPT | Performed by: INTERNAL MEDICINE

## 2024-09-15 PROCEDURE — 80048 BASIC METABOLIC PNL TOTAL CA: CPT

## 2024-09-15 PROCEDURE — 82948 REAGENT STRIP/BLOOD GLUCOSE: CPT

## 2024-09-15 PROCEDURE — 71045 X-RAY EXAM CHEST 1 VIEW: CPT

## 2024-09-15 PROCEDURE — 99232 SBSQ HOSP IP/OBS MODERATE 35: CPT | Performed by: HOSPITALIST

## 2024-09-15 RX ORDER — INSULIN LISPRO 100 [IU]/ML
1-5 INJECTION, SOLUTION INTRAVENOUS; SUBCUTANEOUS EVERY 6 HOURS SCHEDULED
Status: DISPENSED | OUTPATIENT
Start: 2024-09-15 | End: 2024-09-15

## 2024-09-15 RX ORDER — DEXTROSE MONOHYDRATE 25 G/50ML
INJECTION, SOLUTION INTRAVENOUS
Status: COMPLETED
Start: 2024-09-15 | End: 2024-09-15

## 2024-09-15 RX ORDER — ACETAMINOPHEN 325 MG/1
650 TABLET ORAL EVERY 6 HOURS PRN
Status: DISCONTINUED | OUTPATIENT
Start: 2024-09-15 | End: 2024-09-19 | Stop reason: HOSPADM

## 2024-09-15 RX ADMIN — DEXTROSE MONOHYDRATE 25 ML: 25 INJECTION, SOLUTION INTRAVENOUS at 17:15

## 2024-09-15 RX ADMIN — BACLOFEN 10 MG: 10 TABLET ORAL at 23:32

## 2024-09-15 RX ADMIN — OXYCODONE HYDROCHLORIDE AND ACETAMINOPHEN 1000 MG: 500 TABLET ORAL at 09:33

## 2024-09-15 RX ADMIN — DOCOSANOL: 100 CREAM TOPICAL at 18:04

## 2024-09-15 RX ADMIN — CITALOPRAM HYDROBROMIDE 40 MG: 20 TABLET ORAL at 09:32

## 2024-09-15 RX ADMIN — MIDODRINE HYDROCHLORIDE 5 MG: 5 TABLET ORAL at 18:04

## 2024-09-15 RX ADMIN — VALPROIC ACID 125 MG: 250 SOLUTION ORAL at 23:33

## 2024-09-15 RX ADMIN — DOCOSANOL: 100 CREAM TOPICAL at 09:35

## 2024-09-15 RX ADMIN — BACLOFEN 10 MG: 10 TABLET ORAL at 09:33

## 2024-09-15 RX ADMIN — CARBIDOPA AND LEVODOPA 1 TABLET: 25; 100 TABLET ORAL at 09:32

## 2024-09-15 RX ADMIN — DOCOSANOL: 100 CREAM TOPICAL at 23:47

## 2024-09-15 RX ADMIN — BACLOFEN 10 MG: 10 TABLET ORAL at 18:04

## 2024-09-15 RX ADMIN — ENOXAPARIN SODIUM 40 MG: 40 INJECTION SUBCUTANEOUS at 09:34

## 2024-09-15 RX ADMIN — METHENAMINE HIPPURATE 1 G: 1 TABLET ORAL at 18:04

## 2024-09-15 RX ADMIN — CALCIUM CARBONATE (ANTACID) CHEW TAB 500 MG 750 MG: 500 CHEW TAB at 09:33

## 2024-09-15 RX ADMIN — LORAZEPAM 0.5 MG: 0.5 TABLET ORAL at 23:32

## 2024-09-15 RX ADMIN — INSULIN LISPRO 1 UNITS: 100 INJECTION, SOLUTION INTRAVENOUS; SUBCUTANEOUS at 12:26

## 2024-09-15 RX ADMIN — CALCIUM CARBONATE (ANTACID) CHEW TAB 500 MG 750 MG: 500 CHEW TAB at 23:32

## 2024-09-15 RX ADMIN — CARBIDOPA AND LEVODOPA 1 TABLET: 25; 100 TABLET ORAL at 18:06

## 2024-09-15 RX ADMIN — CARBIDOPA AND LEVODOPA 1 TABLET: 25; 100 TABLET ORAL at 12:12

## 2024-09-15 RX ADMIN — OXYBUTYNIN CHLORIDE 5 MG: 5 TABLET ORAL at 18:04

## 2024-09-15 RX ADMIN — POLYETHYLENE GLYCOL 3350 17 G: 17 POWDER, FOR SOLUTION ORAL at 09:35

## 2024-09-15 RX ADMIN — METHENAMINE HIPPURATE 1 G: 1 TABLET ORAL at 09:35

## 2024-09-15 RX ADMIN — OXYBUTYNIN CHLORIDE 5 MG: 5 TABLET ORAL at 09:33

## 2024-09-15 RX ADMIN — MIDODRINE HYDROCHLORIDE 5 MG: 5 TABLET ORAL at 09:32

## 2024-09-15 RX ADMIN — INSULIN LISPRO 1 UNITS: 100 INJECTION, SOLUTION INTRAVENOUS; SUBCUTANEOUS at 01:00

## 2024-09-15 RX ADMIN — ARIPIPRAZOLE 2 MG: 2 TABLET ORAL at 23:32

## 2024-09-15 RX ADMIN — OXYBUTYNIN CHLORIDE 5 MG: 5 TABLET ORAL at 23:32

## 2024-09-15 RX ADMIN — Medication 630 MG: at 07:06

## 2024-09-15 RX ADMIN — DOCOSANOL: 100 CREAM TOPICAL at 12:12

## 2024-09-15 RX ADMIN — PRAVASTATIN SODIUM 40 MG: 40 TABLET ORAL at 18:04

## 2024-09-15 RX ADMIN — DOCOSANOL: 100 CREAM TOPICAL at 14:00

## 2024-09-15 RX ADMIN — VALPROIC ACID 125 MG: 250 SOLUTION ORAL at 09:32

## 2024-09-15 RX ADMIN — Medication 20 MG: at 09:34

## 2024-09-15 RX ADMIN — Medication 1000 UNITS: at 09:33

## 2024-09-15 RX ADMIN — FERROUS SULFATE 300 MG: 300 SOLUTION ORAL at 18:04

## 2024-09-15 RX ADMIN — LEVOTHYROXINE SODIUM 25 MCG: 25 TABLET ORAL at 09:33

## 2024-09-15 RX ADMIN — CARBIDOPA AND LEVODOPA 1 TABLET: 25; 100 TABLET ORAL at 23:32

## 2024-09-15 RX ADMIN — FERROUS SULFATE 300 MG: 300 SOLUTION ORAL at 09:32

## 2024-09-15 RX ADMIN — MIDODRINE HYDROCHLORIDE 5 MG: 5 TABLET ORAL at 12:12

## 2024-09-15 NOTE — ASSESSMENT & PLAN NOTE
Pt noted to be hypotensive intermittently throughout hospitalization. Per caregivers and record review, pt's BP tends to run on softer side. Pt has hx of PD which could contribute to autonomic dysfunction as well. Pt's BP have been appropriate since initiation of Midodrine 5 mg TID.     Continue midodrine 5 mg TID  Compression stockings  VS q4h  Fluid intake per Nutrition, see plan as under dysphagia

## 2024-09-15 NOTE — ASSESSMENT & PLAN NOTE
Continue home meds: Abilify 2 mg, Citalopram 40 mg.  Pt previously on trazodone 100 mg q HS which was discontinued on 8/30 while in the ICU d/t contributions to sedation.    Continue Depakene oral solution 125 mg q12h for mood, can defer changes per outpatient psychiatry as deemed necessary.  Pt has been stable from this standpoint throughout hospitalization, hasn't required restarting of trazodone or escalation in valproic acid thus far.  [facility] evaluation/clearance for rehab placement d/t a chart history of bipolar and IDD. Per psychiatry, pt is stable on current regimen, no indication for inpatient psychiatric treatment, and pt is not an imminent risk of harm to herself or others.

## 2024-09-15 NOTE — ASSESSMENT & PLAN NOTE
"CT neck no organic obstructions in the mediastinum. Pt has had poor PO intake, malnutrition requiring gastrostomy tube placement on 08/26. Per group home, pt attends day program which cannot manage tube feeds so requesting her to receive it from 6pm to 6am. Settings adjusted to take this into consideration per nutrition. Nutrition's recommendations to change tube feed from Glucerna to Nepro d/t lower potassium content of the latter. Originally switched from Glucerna to Nepro d/t elevated potassium; however pt tolerated Glucerna better as evidenced by glucose readings.     Plan:  Will switch back to Glucerna 1.2 d/t hyperglycemia.  Per Nutrition:   \"70ml water before and after running the feeding, 70ml q 2 hours while feeding is running. 1263 mL total fee water daily with formula and flushes. Continue Glucerna 1.2 at 80ml/hr x 12 hours. 960mL glucerna, 1152 kcal daily.\"  Pt not to receive oral feeds due to risk of aspiration, evidenced by: episode previous to this hospitalization, during this hospitalization, and now current fever   Pt's group home insisting on attempting pleasure feeds despite education. However, given new fever and risk factors, we will not be ordering oral diet. Discussed this with group home at length.  "

## 2024-09-15 NOTE — ASSESSMENT & PLAN NOTE
Lab Results   Component Value Date    HGBA1C 6.2 (H) 08/13/2024     Recent Labs     09/14/24  2355 09/15/24  0711 09/15/24  1045 09/15/24  1215   POCGLU 193* 289* 242* 183*     Blood Sugar Average: Last 72 hrs:  (P) [id number]    PMHx of diabetes. Pt having hyperglycemic episodes in the setting of tube feeds, possibly related to refeeding syndrome although pt has not had electrolyte abnormalities. Nutrition switched feed from Jevity 1.5 to Glucerna 1.2 for hyperglycemia, then to Nepro on 9/12 for hyperkalemia. Pt was doing well on 25u Lantus however became hypoglycemic; Lantus decreased to 18u on 9/6, then to 15u on 9/7. On 9/8 switched Lantus 15u to NPH 15u to prevent evening hypoglycemia in the setting of cyclic tube feeds; however pt still hyperglycemic w BG>200 in the AM.  9/11 NPH 15u evening and NPH 5u morning. 9/12 discontinued morning NPH d/t patient's mid-morning hypoglycemia. This AM pt hyperglycemic likely secondary to Nepro tube feed. Pt's recent hyperglycemia could also be attributed to new fever and new potential source of infection, workup pending. For now:     Continue NPH 20 units at 6PM, to be given when initiating tube feeds  Will discontinue sliding scale insulin after today as we anticipate better glycemic control once tube feed switches to Glucerna tonight, and pt had been doing well previously without sliding scale  Can reassess tomorrow   Hypoglycemia protocol in place

## 2024-09-15 NOTE — ASSESSMENT & PLAN NOTE
9/14 pt had new onset fevers, Tmax 102.1F. Symptomatic relief with Tylenol. This morning pt continues to be febrile and required another PRN of Tylenol. Current workup is pending to rule-out source of fever. No leucocytosis or tachycardia today. Suspecting another aspiration pneumonia vs aspiration pneumonitis.    CXR, UA pending  Procalcitonin and lactic acid pending  PRN Tylenol for fever

## 2024-09-15 NOTE — PROGRESS NOTES
"Progress Note - Hospitalist   Name: Terrie Alicea 64 y.o. female I MRN: [id number]  Unit/Bed#: S -01 I Date of Admission: 8/24/2024   Date of Service: 9/15/2024 I Hospital Day: 22    Assessment & Plan  Dysphagia  CT neck no organic obstructions in the mediastinum. Pt has had poor PO intake, malnutrition requiring gastrostomy tube placement on 08/26. Per group home, pt attends day program which cannot manage tube feeds so requesting her to receive it from 6pm to 6am. Settings adjusted to take this into consideration per nutrition. Nutrition's recommendations to change tube feed from Glucerna to Nepro d/t lower potassium content of the latter. Originally switched from Glucerna to Nepro d/t elevated potassium; however pt tolerated Glucerna better as evidenced by glucose readings.     Plan:  Will switch back to Glucerna 1.2 d/t hyperglycemia.  Per Nutrition:   \"70ml water before and after running the feeding, 70ml q 2 hours while feeding is running. 1263 mL total fee water daily with formula and flushes. Continue Glucerna 1.2 at 80ml/hr x 12 hours. 960mL glucerna, 1152 kcal daily.\"  Pt not to receive oral feeds due to risk of aspiration, evidenced by: episode previous to this hospitalization, during this hospitalization, and now current fever   Pt's group home insisting on attempting pleasure feeds despite education. However, given new fever and risk factors, we will not be ordering oral diet. Discussed this with group home at length.  DM (diabetes mellitus) ([facility])  Lab Results   Component Value Date    HGBA1C 6.2 (H) 08/13/2024     Recent Labs     09/14/24  2355 09/15/24  0711 09/15/24  1045 09/15/24  1215   POCGLU 193* 289* 242* 183*     Blood Sugar Average: Last 72 hrs:  (P) [id number]    PMHx of diabetes. Pt having hyperglycemic episodes in the setting of tube feeds, possibly related to refeeding syndrome although pt has not had electrolyte abnormalities. Nutrition switched feed from Jevity 1.5 to " Glucerna 1.2 for hyperglycemia, then to Nepro on 9/12 for hyperkalemia. Pt was doing well on 25u Lantus however became hypoglycemic; Lantus decreased to 18u on 9/6, then to 15u on 9/7. On 9/8 switched Lantus 15u to NPH 15u to prevent evening hypoglycemia in the setting of cyclic tube feeds; however pt still hyperglycemic w BG>200 in the AM.  9/11 NPH 15u evening and NPH 5u morning. 9/12 discontinued morning NPH d/t patient's mid-morning hypoglycemia. This AM pt hyperglycemic likely secondary to Nepro tube feed. Pt's recent hyperglycemia could also be attributed to new fever and new potential source of infection, workup pending. For now:     Continue NPH 20 units at 6PM, to be given when initiating tube feeds  Will discontinue sliding scale insulin after today as we anticipate better glycemic control once tube feed switches to Glucerna tonight, and pt had been doing well previously without sliding scale  Can reassess tomorrow   Hypoglycemia protocol in place  Fever  9/14 pt had new onset fevers, Tmax 102.1F. Symptomatic relief with Tylenol. This morning pt continues to be febrile and required another PRN of Tylenol. Current workup is pending to rule-out source of fever. No leucocytosis or tachycardia today. Suspecting another aspiration pneumonia vs aspiration pneumonitis.    CXR, UA pending  Procalcitonin and lactic acid pending  PRN Tylenol for fever  Hypotension  Pt noted to be hypotensive intermittently throughout hospitalization. Per caregivers and record review, pt's BP tends to run on softer side. Pt has hx of PD which could contribute to autonomic dysfunction as well. Pt's BP have been appropriate since initiation of Midodrine 5 mg TID.     Continue midodrine 5 mg TID  Compression stockings  VS q4h  Fluid intake per Nutrition, see plan as under dysphagia  Cerebral palsy (HCC)  Continue baclofen 10 mg TID  Incontinence  Continue oxybutynin 5 mg TID  Parkinson's disease  History of mood disturbances, previously  on neuroleptics. Tardive dyskinesia on exam.    Sinemet  qid. Can defer changes to outpatient neurology.   Mood disturbances  Continue home meds: Abilify 2 mg, Citalopram 40 mg.  Pt previously on trazodone 100 mg q HS which was discontinued on 8/30 while in the ICU d/t contributions to sedation.    Continue Depakene oral solution 125 mg q12h for mood, can defer changes per outpatient psychiatry as deemed necessary.  Pt has been stable from this standpoint throughout hospitalization, hasn't required restarting of trazodone or escalation in valproic acid thus far.  [facility] requested psychiatric evaluation/clearance for rehab placement d/t a chart history of bipolar and IDD. Per psychiatry, pt is stable on current regimen, no indication for inpatient psychiatric treatment, and pt is not an imminent risk of harm to herself or others.     VTE Pharmacologic Prophylaxis: VTE Score: 7 High Risk (Score >/= 5) - Pharmacological DVT Prophylaxis Ordered: enoxaparin (Lovenox). Sequential Compression Devices Ordered.    Mobility:   Basic Mobility Inpatient Raw Score: 6  JH-HLM Goal: 2: Bed activities/Dependent transfer  JH-HLM Achieved: 2: Bed activities/Dependent transfer  JH-HLM Goal achieved. Continue to encourage appropriate mobility.    Patient Centered Rounds: I performed bedside rounds with nursing staff today.   Discussions with Specialists or Other Care Team Provider: nursing staff    Education and Discussions with Family / Patient: Updated  (Lisa) via phone.    Current Length of Stay: 22 day(s)  Current Patient Status: Inpatient   Discharge Plan:  patient is pending placement, updates from [facility] per CM    Code Status: Level 1 - Full Code    Subjective   Pt seen and examined at bedside. Per nursing report, pt became febrile yesterday and has required PRN Tylenol for relief. Pt today is seen laying in bed, not observed to be in any acute distress. She is sleeping; however, opens her eyes to stimuli.  Physical examination unchanged.     Objective     Vitals:   Temp (24hrs), Av.4 °F (38 °C), Min:98.3 °F (36.8 °C), Max:102.1 °F (38.9 °C)    Temp:  [98.3 °F (36.8 °C)-102.1 °F (38.9 °C)] 100.1 °F (37.8 °C)  HR:  [] 90  Resp:  [16-20] 18  BP: (108-150)/(67-95) 130/86  SpO2:  [89 %-97 %] 97 %  Body mass index is 19.08 kg/m².     Input and Output Summary (last 24 hours):     Intake/Output Summary (Last 24 hours) at 9/15/2024 1238  Last data filed at 9/15/2024 0900  Gross per 24 hour   Intake 908 ml   Output 538 ml   Net 370 ml       Physical Exam  Vitals reviewed.   Constitutional:       General: She is not in acute distress.     Appearance: She is not toxic-appearing.   HENT:      Head: Normocephalic and atraumatic.      Right Ear: External ear normal.      Left Ear: External ear normal.      Nose: Nose normal.      Mouth/Throat:      Pharynx: No oropharyngeal exudate or posterior oropharyngeal erythema.      Comments: Orofacial dyskinesia (chronic)  Eyes:      Extraocular Movements: Extraocular movements intact.   Cardiovascular:      Rate and Rhythm: Normal rate and regular rhythm.      Pulses: Normal pulses.      Heart sounds: Normal heart sounds.   Pulmonary:      Effort: Pulmonary effort is normal. No respiratory distress.   Abdominal:      General: Bowel sounds are normal. There is no distension.      Tenderness: There is no guarding.   Musculoskeletal:         General: Deformity (chronic) present.   Skin:     General: Skin is warm and dry.      Capillary Refill: Capillary refill takes less than 2 seconds.      Coloration: Skin is not jaundiced or pale.   Neurological:      Mental Status: She is alert. Mental status is at baseline.          Lines/Drains:  Lines/Drains/Airways       Active Status       Name Placement date Placement time Site Days    Gastrostomy/Enterostomy  16 Fr. LUQ 24  1643  LUQ  19                            Lab Results: I have reviewed the following results:   Results from  last 7 days   Lab Units 09/15/24  0624   WBC Thousand/uL 6.44   HEMOGLOBIN g/dL 9.9*   HEMATOCRIT % 31.5*   PLATELETS Thousands/uL 198   SEGS PCT % 65   LYMPHO PCT % 23   MONO PCT % 8   EOS PCT % 3     Results from last 7 days   Lab Units 09/15/24  0624   SODIUM mmol/L 137   POTASSIUM mmol/L 4.5   CHLORIDE mmol/L 98   CO2 mmol/L 29   BUN mg/dL 32*   CREATININE mg/dL 1.06   ANION GAP mmol/L 10   CALCIUM mg/dL 10.7*   GLUCOSE RANDOM mg/dL 274*         Results from last 7 days   Lab Units 09/15/24  1215 09/15/24  1045 09/15/24  0711 09/14/24  2355 09/14/24  1807 09/14/24  1242 09/14/24  0658 09/14/24  0020 09/13/24  1837 09/13/24  1431 09/13/24  1108 09/13/24  0809   POC GLUCOSE mg/dl 183* 242* 289* 193* 201* 328* 246* 109 167* 195* 238* 204*         Results from last 7 days   Lab Units 09/15/24  0624   PROCALCITONIN ng/ml 0.11       Recent Cultures (last 7 days):         Imaging Review: Reviewed radiology reports from this admission including: chest xray and CT abdomen/pelvis.    Last 24 Hours Medication List:     Current Facility-Administered Medications:     acetaminophen (TYLENOL) tablet 650 mg, Q6H PRN    ammonium lactate (LAC-HYDRIN) 12 % cream, BID PRN    ARIPiprazole (ABILIFY) tablet 2 mg, HS    artificial tear ophthalmic ointment, HS PRN    ascorbic acid (VITAMIN C) tablet 1,000 mg, Daily    baclofen tablet 10 mg, TID    calcium carbonate (TUMS) chewable tablet 750 mg, BID    carbamide peroxide (DEBROX) 6.5 % otic solution 2 drop, BID PRN    carbidopa-levodopa (SINEMET)  mg per tablet 1 tablet, 4x Daily    Cholecalciferol (VITAMIN D3) tablet 1,000 Units, Daily    citalopram (CeleXA) tablet 40 mg, Daily    dextromethorphan-guaiFENesin (ROBITUSSIN DM) oral syrup 10 mL, Q4H PRN    docosanol (ABREVA) 10 % cream, 5x Daily    enoxaparin (LOVENOX) subcutaneous injection 40 mg, Daily    Ferrous Sulfate oral syrup 300 mg, BID AC    fluticasone (FLONASE) 50 mcg/act nasal spray 1 spray, Daily PRN    hydrocortisone  1 % cream, 4x Daily PRN    insulin lispro (HumALOG/ADMELOG) 100 units/mL subcutaneous injection 1-5 Units, Q6H ROSEMARY **AND** Fingerstick Glucose (POCT), Q6H    insulin NPH (HumuLIN N,NovoLIN N) 100 Units/mL subcutaneous injection 20 Units, HS    Ketotifen Fumarate (ZADITOR) 0.035 % ophthalmic solution 1 drop, Daily PRN    levothyroxine tablet 25 mcg, QAM    Loratadine (CLARITIN) oral soln 10 mg, Daily PRN    LORazepam (ATIVAN) tablet 0.5 mg, HS    methenamine hippurate (HIPREX) tablet 1 g, BID    midodrine (PROAMATINE) tablet 5 mg, TID AC    omeprazole (PRILOSEC) suspension 2 mg/mL, Daily    oxybutynin (DITROPAN) tablet 5 mg, TID    polyethylene glycol (MIRALAX) packet 17 g, Daily    pravastatin (PRAVACHOL) tablet 40 mg, Daily With Dinner    valproic acid (DEPAKENE) oral soln 125 mg, Q12H ROSEMARY    Administrative Statements   Today, Patient Was Seen By: Silvana Mike DO  PGY-1    **Please Note: This note may have been constructed using a voice recognition system.**

## 2024-09-16 LAB
ANION GAP SERPL CALCULATED.3IONS-SCNC: 5 MMOL/L (ref 4–13)
BACTERIA UR QL AUTO: ABNORMAL /HPF
BASOPHILS # BLD AUTO: 0.05 THOUSANDS/ΜL (ref 0–0.1)
BASOPHILS NFR BLD AUTO: 1 % (ref 0–1)
BILIRUB UR QL STRIP: NEGATIVE
BUN SERPL-MCNC: 32 MG/DL (ref 5–25)
CALCIUM SERPL-MCNC: 9.7 MG/DL (ref 8.4–10.2)
CHLORIDE SERPL-SCNC: 97 MMOL/L (ref 96–108)
CLARITY UR: CLEAR
CO2 SERPL-SCNC: 35 MMOL/L (ref 21–32)
COLOR UR: ABNORMAL
CREAT SERPL-MCNC: 1.02 MG/DL (ref 0.6–1.3)
EOSINOPHIL # BLD AUTO: 0.57 THOUSAND/ΜL (ref 0–0.61)
EOSINOPHIL NFR BLD AUTO: 9 % (ref 0–6)
ERYTHROCYTE [DISTWIDTH] IN BLOOD BY AUTOMATED COUNT: 13.6 % (ref 11.6–15.1)
GFR SERPL CREATININE-BSD FRML MDRD: 59 ML/MIN/1.73SQ M
GLUCOSE SERPL-MCNC: 169 MG/DL (ref 65–140)
GLUCOSE SERPL-MCNC: 170 MG/DL (ref 65–140)
GLUCOSE SERPL-MCNC: 245 MG/DL (ref 65–140)
GLUCOSE SERPL-MCNC: 276 MG/DL (ref 65–140)
GLUCOSE SERPL-MCNC: 315 MG/DL (ref 65–140)
GLUCOSE UR STRIP-MCNC: NEGATIVE MG/DL
HCT VFR BLD AUTO: 32 % (ref 34.8–46.1)
HGB BLD-MCNC: 10.3 G/DL (ref 11.5–15.4)
HGB UR QL STRIP.AUTO: NEGATIVE
IMM GRANULOCYTES # BLD AUTO: 0.03 THOUSAND/UL (ref 0–0.2)
IMM GRANULOCYTES NFR BLD AUTO: 1 % (ref 0–2)
KETONES UR STRIP-MCNC: NEGATIVE MG/DL
LEUKOCYTE ESTERASE UR QL STRIP: NEGATIVE
LYMPHOCYTES # BLD AUTO: 2.28 THOUSANDS/ΜL (ref 0.6–4.47)
LYMPHOCYTES NFR BLD AUTO: 36 % (ref 14–44)
MCH RBC QN AUTO: 33 PG (ref 26.8–34.3)
MCHC RBC AUTO-ENTMCNC: 32.2 G/DL (ref 31.4–37.4)
MCV RBC AUTO: 103 FL (ref 82–98)
MONOCYTES # BLD AUTO: 0.57 THOUSAND/ΜL (ref 0.17–1.22)
MONOCYTES NFR BLD AUTO: 9 % (ref 4–12)
NEUTROPHILS # BLD AUTO: 2.87 THOUSANDS/ΜL (ref 1.85–7.62)
NEUTS SEG NFR BLD AUTO: 44 % (ref 43–75)
NITRITE UR QL STRIP: NEGATIVE
NON-SQ EPI CELLS URNS QL MICRO: ABNORMAL /HPF
NRBC BLD AUTO-RTO: 0 /100 WBCS
PH UR STRIP.AUTO: 6.5 [PH]
PLATELET # BLD AUTO: 215 THOUSANDS/UL (ref 149–390)
PMV BLD AUTO: 12.3 FL (ref 8.9–12.7)
POTASSIUM SERPL-SCNC: 4.8 MMOL/L (ref 3.5–5.3)
PROT UR STRIP-MCNC: ABNORMAL MG/DL
RBC # BLD AUTO: 3.12 MILLION/UL (ref 3.81–5.12)
RBC #/AREA URNS AUTO: ABNORMAL /HPF
SODIUM SERPL-SCNC: 137 MMOL/L (ref 135–147)
SP GR UR STRIP.AUTO: 1.02 (ref 1–1.03)
UROBILINOGEN UR STRIP-ACNC: <2 MG/DL
WBC # BLD AUTO: 6.37 THOUSAND/UL (ref 4.31–10.16)
WBC #/AREA URNS AUTO: ABNORMAL /HPF

## 2024-09-16 PROCEDURE — 99232 SBSQ HOSP IP/OBS MODERATE 35: CPT | Performed by: HOSPITALIST

## 2024-09-16 PROCEDURE — 82948 REAGENT STRIP/BLOOD GLUCOSE: CPT

## 2024-09-16 PROCEDURE — 85025 COMPLETE CBC W/AUTO DIFF WBC: CPT

## 2024-09-16 PROCEDURE — 80048 BASIC METABOLIC PNL TOTAL CA: CPT

## 2024-09-16 PROCEDURE — 81001 URINALYSIS AUTO W/SCOPE: CPT | Performed by: INTERNAL MEDICINE

## 2024-09-16 RX ORDER — INSULIN LISPRO 100 [IU]/ML
1-5 INJECTION, SOLUTION INTRAVENOUS; SUBCUTANEOUS EVERY 6 HOURS SCHEDULED
Status: COMPLETED | OUTPATIENT
Start: 2024-09-16 | End: 2024-09-16

## 2024-09-16 RX ADMIN — INSULIN LISPRO 3 UNITS: 100 INJECTION, SOLUTION INTRAVENOUS; SUBCUTANEOUS at 12:51

## 2024-09-16 RX ADMIN — CALCIUM CARBONATE (ANTACID) CHEW TAB 500 MG 750 MG: 500 CHEW TAB at 22:50

## 2024-09-16 RX ADMIN — MIDODRINE HYDROCHLORIDE 5 MG: 5 TABLET ORAL at 12:40

## 2024-09-16 RX ADMIN — Medication 1000 UNITS: at 09:06

## 2024-09-16 RX ADMIN — BACLOFEN 10 MG: 10 TABLET ORAL at 09:06

## 2024-09-16 RX ADMIN — LEVOTHYROXINE SODIUM 25 MCG: 25 TABLET ORAL at 09:06

## 2024-09-16 RX ADMIN — OXYBUTYNIN CHLORIDE 5 MG: 5 TABLET ORAL at 17:59

## 2024-09-16 RX ADMIN — METHENAMINE HIPPURATE 1 G: 1 TABLET ORAL at 18:07

## 2024-09-16 RX ADMIN — CARBIDOPA AND LEVODOPA 1 TABLET: 25; 100 TABLET ORAL at 22:51

## 2024-09-16 RX ADMIN — OXYBUTYNIN CHLORIDE 5 MG: 5 TABLET ORAL at 22:50

## 2024-09-16 RX ADMIN — BACLOFEN 10 MG: 10 TABLET ORAL at 22:50

## 2024-09-16 RX ADMIN — DOCOSANOL: 100 CREAM TOPICAL at 18:08

## 2024-09-16 RX ADMIN — FERROUS SULFATE 300 MG: 300 SOLUTION ORAL at 18:01

## 2024-09-16 RX ADMIN — DOCOSANOL: 100 CREAM TOPICAL at 12:48

## 2024-09-16 RX ADMIN — MIDODRINE HYDROCHLORIDE 5 MG: 5 TABLET ORAL at 17:59

## 2024-09-16 RX ADMIN — MIDODRINE HYDROCHLORIDE 5 MG: 5 TABLET ORAL at 06:21

## 2024-09-16 RX ADMIN — VALPROIC ACID 125 MG: 250 SOLUTION ORAL at 22:50

## 2024-09-16 RX ADMIN — CARBIDOPA AND LEVODOPA 1 TABLET: 25; 100 TABLET ORAL at 12:40

## 2024-09-16 RX ADMIN — ENOXAPARIN SODIUM 40 MG: 40 INJECTION SUBCUTANEOUS at 09:13

## 2024-09-16 RX ADMIN — DOCOSANOL: 100 CREAM TOPICAL at 15:28

## 2024-09-16 RX ADMIN — Medication 20 MG: at 09:48

## 2024-09-16 RX ADMIN — PRAVASTATIN SODIUM 40 MG: 40 TABLET ORAL at 17:59

## 2024-09-16 RX ADMIN — INSULIN LISPRO 1 UNITS: 100 INJECTION, SOLUTION INTRAVENOUS; SUBCUTANEOUS at 18:23

## 2024-09-16 RX ADMIN — CITALOPRAM HYDROBROMIDE 40 MG: 20 TABLET ORAL at 09:06

## 2024-09-16 RX ADMIN — FERROUS SULFATE 300 MG: 300 SOLUTION ORAL at 06:21

## 2024-09-16 RX ADMIN — CALCIUM CARBONATE (ANTACID) CHEW TAB 500 MG 750 MG: 500 CHEW TAB at 09:07

## 2024-09-16 RX ADMIN — BACLOFEN 10 MG: 10 TABLET ORAL at 17:59

## 2024-09-16 RX ADMIN — ARIPIPRAZOLE 2 MG: 2 TABLET ORAL at 22:55

## 2024-09-16 RX ADMIN — OXYCODONE HYDROCHLORIDE AND ACETAMINOPHEN 1000 MG: 500 TABLET ORAL at 09:06

## 2024-09-16 RX ADMIN — METHENAMINE HIPPURATE 1 G: 1 TABLET ORAL at 09:12

## 2024-09-16 RX ADMIN — POLYETHYLENE GLYCOL 3350 17 G: 17 POWDER, FOR SOLUTION ORAL at 09:13

## 2024-09-16 RX ADMIN — CARBIDOPA AND LEVODOPA 1 TABLET: 25; 100 TABLET ORAL at 17:59

## 2024-09-16 RX ADMIN — CARBIDOPA AND LEVODOPA 1 TABLET: 25; 100 TABLET ORAL at 09:06

## 2024-09-16 RX ADMIN — OXYBUTYNIN CHLORIDE 5 MG: 5 TABLET ORAL at 09:06

## 2024-09-16 RX ADMIN — LORATADINE 10 MG: 5 SOLUTION ORAL at 09:13

## 2024-09-16 RX ADMIN — LORAZEPAM 0.5 MG: 0.5 TABLET ORAL at 22:50

## 2024-09-16 RX ADMIN — VALPROIC ACID 125 MG: 250 SOLUTION ORAL at 09:07

## 2024-09-16 RX ADMIN — INSULIN HUMAN 20 UNITS: 100 INJECTION, SUSPENSION SUBCUTANEOUS at 18:33

## 2024-09-16 RX ADMIN — DOCOSANOL: 100 CREAM TOPICAL at 06:22

## 2024-09-16 RX ADMIN — DOCOSANOL: 100 CREAM TOPICAL at 23:02

## 2024-09-16 NOTE — PROGRESS NOTES
"Progress Note - Hospitalist   Name: Terrie Alicea 64 y.o. female I MRN: [id number]  Unit/Bed#: S -01 I Date of Admission: 8/24/2024   Date of Service: 9/16/2024 I Hospital Day: 23    Assessment & Plan  Dysphagia  CT neck no organic obstructions in the mediastinum. Pt has had poor PO intake, malnutrition requiring gastrostomy tube placement on 08/26. Per group home, pt attends day program which cannot manage tube feeds so requesting her to receive it from 6pm to 6am. Settings adjusted to take this into consideration per nutrition. Nutrition's recommendations to change tube feed from Glucerna to Nepro d/t lower potassium content of the latter. Originally switched from Glucerna to Nepro d/t elevated potassium; however pt tolerated Glucerna better as evidenced by glucose readings.     Plan:  Will switch back to Glucerna 1.2 d/t hyperglycemia.  Per Nutrition:   \"70ml water before and after running the feeding, 70ml q 2 hours while feeding is running. 1263 mL total fee water daily with formula and flushes. Continue Glucerna 1.2 at 80ml/hr x 12 hours. 960mL glucerna, 1152 kcal daily.\"  Pt not to receive oral feeds due to risk of aspiration, evidenced by: episode previous to this hospitalization, during this hospitalization, and now current fever   Pt's group home insisting on attempting pleasure feeds despite education. However, given new fever and risk factors, we will not be ordering oral diet. Discussed this with group home at length.  DM (diabetes mellitus) ([facility])  Lab Results   Component Value Date    HGBA1C 6.2 (H) 08/13/2024     Recent Labs     09/15/24  1739 09/16/24  0031 09/16/24  0715 09/16/24  1112   POCGLU 126 170* 276* 315*     Blood Sugar Average: Last 72 hrs:  (P) [id number]    PMHx of diabetes. Pt having hyperglycemic episodes in the setting of tube feeds, possibly related to refeeding syndrome although pt has not had electrolyte abnormalities. Nutrition switched feed from Jevity 1.5 to " Glucerna 1.2 for hyperglycemia, then to Nepro on 9/12 for hyperkalemia. Pt was doing well on 25u Lantus however became hypoglycemic; Lantus decreased to 18u on 9/6, then to 15u on 9/7. On 9/8 switched Lantus 15u to NPH 15u to prevent evening hypoglycemia in the setting of cyclic tube feeds; however pt still hyperglycemic w BG>200 in the AM.  9/11 NPH 15u evening and NPH 5u morning. 9/12 discontinued morning NPH d/t patient's mid-morning hypoglycemia. This AM pt hyperglycemic likely secondary to Nepro tube feed. Pt's recent hyperglycemia could also be attributed to new fever and new potential source of infection, workup pending. For now:     Continue NPH 20 units at 6PM, to be given when initiating tube feeds  Pt on Glucerna last night. One BS of 48. After tube feeds 248. Will place on SSI in daytime for now with NPH 20 units at night. If BS still fluctuating will consult Endo.  Hypoglycemia protocol in place  Fever  9/14 pt had new onset fevers, Tmax 102.1F. Symptomatic relief with Tylenol. Current workup is pending to rule-out source of fever. No leucocytosis or tachycardia today. Suspecting another aspiration pneumonia vs aspiration pneumonitis.    CXR, UA pending  Procal 0.11.  PRN Tylenol for fever  Hypotension  Pt noted to be hypotensive intermittently throughout hospitalization. Per caregivers and record review, pt's BP tends to run on softer side. Pt has hx of PD which could contribute to autonomic dysfunction as well. Pt's BP have been appropriate since initiation of Midodrine 5 mg TID.     Continue midodrine 5 mg TID  Compression stockings  VS q4h  Fluid intake per Nutrition, see plan as under dysphagia  MAPs remain above 65.   Cerebral palsy (HCC)  Continue baclofen 10 mg TID  Incontinence  Continue oxybutynin 5 mg TID  Parkinson's disease  History of mood disturbances, previously on neuroleptics. Tardive dyskinesia on exam.    Sinemet  qid. Can defer changes to outpatient neurology.   Mood  disturbances  Continue home meds: Abilify 2 mg, Citalopram 40 mg.  Pt previously on trazodone 100 mg q HS which was discontinued on 8/30 while in the ICU d/t contributions to sedation.    Continue Depakene oral solution 125 mg q12h for mood, can defer changes per outpatient psychiatry as deemed necessary.  Pt has been stable from this standpoint throughout hospitalization, hasn't required restarting of trazodone or escalation in valproic acid thus far.  [facility] requested psychiatric evaluation/clearance for rehab placement d/t a chart history of bipolar and IDD. Per psychiatry, pt is stable on current regimen, no indication for inpatient psychiatric treatment, and pt is not an imminent risk of harm to herself or others.     VTE Pharmacologic Prophylaxis: VTE Score: 7 Moderate Risk (Score 3-4) - Pharmacological DVT Prophylaxis Ordered: enoxaparin (Lovenox).    Mobility:   Basic Mobility Inpatient Raw Score: 6  -[facility] Goal: 2: Bed activities/Dependent transfer  -HL Achieved: 2: Bed activities/Dependent transfer  Pt with CP.    Patient Centered Rounds: I performed bedside rounds with nursing staff today.   Discussions with Specialists or Other Care Team Provider: None  Family: Will update caregiver.  Current Length of Stay: 23 day(s)  Current Patient Status: Inpatient   Certification Statement: The patient will continue to require additional inpatient hospital stay due to BS control.  Discharge Plan: Anticipate discharge in 24-48 hrs to Skilled nursing facility    Code Status: Level 1 - Full Code    Subjective   Pt was resting today when the team entered the room. Otherwise pt is doing well. Pt has been afebrile for >24 hrs.CXR is not concerning for aspiration pneumonitis or pneumonia. Pt has one episode of 48 BG, but increased to 248 overnight with tube feeds. Will start pt back  on SS for now with 20u Lantus at night for better BG control. If BG remain uncontrolled will consult endo.    Objective     Vitals:   Temp  (24hrs), Av.6 °F (37 °C), Min:97.5 °F (36.4 °C), Max:99.3 °F (37.4 °C)    Temp:  [97.5 °F (36.4 °C)-99.3 °F (37.4 °C)] 99.2 °F (37.3 °C)  HR:  [] 100  Resp:  [16-17] 17  BP: ()/(59-78) 119/78  SpO2:  [94 %-99 %] 95 %  Body mass index is 18.94 kg/m².     Input and Output Summary (last 24 hours):     Intake/Output Summary (Last 24 hours) at 2024 1322  Last data filed at 2024 0800  Gross per 24 hour   Intake 30 ml   Output 0 ml   Net 30 ml       Physical Exam  Constitutional:       General: She is awake. She is not in acute distress.     Appearance: Normal appearance.   HENT:      Head: Normocephalic and atraumatic.      Nose: Nose normal.      Mouth/Throat:      Mouth: Mucous membranes are moist.      Pharynx: Oropharynx is clear.   Eyes:      General: Lids are normal.      Conjunctiva/sclera: Conjunctivae normal.   Cardiovascular:      Rate and Rhythm: Normal rate and regular rhythm.      Heart sounds: Normal heart sounds.   Pulmonary:      Effort: Pulmonary effort is normal.      Breath sounds: Normal breath sounds.   Abdominal:      General: Bowel sounds are normal.      Palpations: Abdomen is soft.   Musculoskeletal:         General: Deformity present.      Comments: Chronic   Skin:     General: Skin is warm and dry.   Neurological:      General: No focal deficit present.      Mental Status: She is alert and oriented to person, place, and time.   Psychiatric:         Mood and Affect: Mood normal.         Behavior: Behavior normal. Behavior is cooperative.         Thought Content: Thought content normal.         Judgment: Judgment normal.          Lines/Drains:  Lines/Drains/Airways       Active Status       Name Placement date Placement time Site Days    Gastrostomy/Enterostomy  16 Fr. LUQ 24  1643  LUQ  20                            Lab Results: I have reviewed the following results: CBC/BMP:   .     24  0621   WBC 6.37   HGB 10.3*   HCT 32.0*      SODIUM 137   K 4.8  "  CL 97   CO2 35*   BUN 32*   CREATININE 1.02   GLUC 245*    , Procalcitonin: No results found for: \"PROCALCITONI\"   Results from last 7 days   Lab Units 09/16/24  0621   WBC Thousand/uL 6.37   HEMOGLOBIN g/dL 10.3*   HEMATOCRIT % 32.0*   PLATELETS Thousands/uL 215   SEGS PCT % 44   LYMPHO PCT % 36   MONO PCT % 9   EOS PCT % 9*     Results from last 7 days   Lab Units 09/16/24  0621   SODIUM mmol/L 137   POTASSIUM mmol/L 4.8   CHLORIDE mmol/L 97   CO2 mmol/L 35*   BUN mg/dL 32*   CREATININE mg/dL 1.02   ANION GAP mmol/L 5   CALCIUM mg/dL 9.7   GLUCOSE RANDOM mg/dL 245*         Results from last 7 days   Lab Units 09/16/24  1112 09/16/24  0715 09/16/24  0031 09/15/24  1739 09/15/24  1650 09/15/24  1649 09/15/24  1255 09/15/24  1215 09/15/24  1045 09/15/24  0711 09/14/24  2355 09/14/24  1807   POC GLUCOSE mg/dl 315* 276* 170* 126 48* 45* 179* 183* 242* 289* 193* 201*         Results from last 7 days   Lab Units 09/15/24  0624   PROCALCITONIN ng/ml 0.11       Recent Cultures (last 7 days):         Imaging Review: Reviewed radiology reports from this admission including: chest xray.  Other Studies: No additional pertinent studies reviewed.    Last 24 Hours Medication List:     Current Facility-Administered Medications:     acetaminophen (TYLENOL) tablet 650 mg, Q6H PRN    ammonium lactate (LAC-HYDRIN) 12 % cream, BID PRN    ARIPiprazole (ABILIFY) tablet 2 mg, HS    artificial tear ophthalmic ointment, HS PRN    ascorbic acid (VITAMIN C) tablet 1,000 mg, Daily    baclofen tablet 10 mg, TID    calcium carbonate (TUMS) chewable tablet 750 mg, BID    carbamide peroxide (DEBROX) 6.5 % otic solution 2 drop, BID PRN    carbidopa-levodopa (SINEMET)  mg per tablet 1 tablet, 4x Daily    Cholecalciferol (VITAMIN D3) tablet 1,000 Units, Daily    citalopram (CeleXA) tablet 40 mg, Daily    dextromethorphan-guaiFENesin (ROBITUSSIN DM) oral syrup 10 mL, Q4H PRN    docosanol (ABREVA) 10 % cream, 5x Daily    enoxaparin (LOVENOX) " subcutaneous injection 40 mg, Daily    Ferrous Sulfate oral syrup 300 mg, BID AC    fluticasone (FLONASE) 50 mcg/act nasal spray 1 spray, Daily PRN    hydrocortisone 1 % cream, 4x Daily PRN    insulin lispro (HumALOG/ADMELOG) 100 units/mL subcutaneous injection 1-5 Units, Q6H ROSEMARY    insulin NPH (HumuLIN N,NovoLIN N) 100 Units/mL subcutaneous injection 20 Units, HS    Ketotifen Fumarate (ZADITOR) 0.035 % ophthalmic solution 1 drop, Daily PRN    levothyroxine tablet 25 mcg, QAM    Loratadine (CLARITIN) oral soln 10 mg, Daily PRN    LORazepam (ATIVAN) tablet 0.5 mg, HS    methenamine hippurate (HIPREX) tablet 1 g, BID    midodrine (PROAMATINE) tablet 5 mg, TID AC    omeprazole (PRILOSEC) suspension 2 mg/mL, Daily    oxybutynin (DITROPAN) tablet 5 mg, TID    polyethylene glycol (MIRALAX) packet 17 g, Daily    pravastatin (PRAVACHOL) tablet 40 mg, Daily With Dinner    valproic acid (DEPAKENE) oral soln 125 mg, Q12H ROSEMARY    Administrative Statements   Today, Patient Was Seen By: Jaun Yoon  I have spent a total time of 20 minutes in caring for this patient on the day of the visit/encounter including Obtaining or reviewing history  .    Jaun Yoon, Medical Student.  **Please Note: This note may have been constructed using a voice recognition system.**

## 2024-09-16 NOTE — QUICK NOTE
Updated patient's caregiver, Lisa, over the phone regarding plan of care.  She states that the one time PO intake of 480 mL documented on 9/13 at 1 PM is erroneous.  She states that there is no way she could have taken that much oral intake.  She would like for urinalysis to be collected to ensure that there is no UTI given that patient cannot tell us if she has any symptoms.  Patient does not follow with endocrinology in the outpatient setting and was on oral antihyperglycemic agents prior to hospitalization.  She is requesting that we have endocrinology consulted so that patient can be followed in the outpatient setting.

## 2024-09-16 NOTE — CASE MANAGEMENT
Case Management Discharge Planning Note    Patient name Terrie Alicea  Location S /S -01 MRN [id number]  : 1960 Date 2024       Current Admission Date: 2024  Current Admission Diagnosis:Dysphagia   Patient Active Problem List    Diagnosis Date Noted Date Diagnosed    Fever 09/15/2024     Mood disturbances 2024     Severe protein-calorie malnutrition (HCC) 2024     Functional quadriplegia ([facility]) 2024     Protein-calorie malnutrition, unspecified severity ([facility]) 2024     Hospital discharge follow-up 2024     SIRS (systemic inflammatory response syndrome) ([facility]) 2024     Developmental delay      Preop examination 2023     Cataract 2023     Internal hemorrhoids 2023     Contracture of right hand 2023     Need for shingles vaccine 2023     Viral illness 2022     Dystonic cerebral palsy ([facility]) 2022     Cervical dystonia 2022     Parkinson's disease 2022     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[facility]) 10/18/2021     Polyneuropathy associated with underlying disease ([facility]) 10/18/2021     Hypercalcemia 2021     Herpes zoster without complication 2021     Abnormal finding on lung imaging 2021     Anemia 2020     Eosinophilic leukocytosis 2020     Underweight 2020     Dysphagia 2020     History of vitamin D deficiency 2020     History of fall 10/28/2019     Breast asymmetry 2019     Hypernatremia 2018     Severe sepsis ([facility]) 2018     Gait disturbance 2018     Osteoarthritis of both hips 2018     Severe Intellectual disability 2018     DM (diabetes mellitus) ([facility]) 2018     Abnormal albumin 2017     Peripheral neuropathy 2017     Seasonal allergies 2017     Physical deconditioning 2017     Hyperlipidemia 2017     Gastroesophageal reflux disease 2017     Cerebral palsy ([facility]) 2017     Spasticity  03/27/2017     Ambulatory dysfunction 03/27/2017     Bipolar disorder (HCC) 03/27/2017     Hypotension 04/11/2016     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/2016     Weight loss 01/21/2016     Osteoporosis 01/19/2016     Resting tremor 01/19/2016     Acute metabolic encephalopathy 12/11/2015     Gait abnormality 12/11/2015     Platelets decreased (HCC) 07/31/2015     Other chronic pain 07/30/2015     Postmenopausal vaginal bleeding 07/23/2015     Anxiety 07/15/2015     Menopause 09/25/2014     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/28/2014     Atopic dermatitis 06/02/2014     Dry eye 06/02/2014     Constipation 04/12/2013     Iron deficiency anemia 03/27/2013     Incontinence 03/27/2013     Hypothyroidism 01/10/2013       LOS (days): 23  Geometric Mean LOS (GMLOS) (days):   Days to GMLOS:     OBJECTIVE:  Risk of Unplanned Readmission Score: 41.68         Current admission status: Inpatient   Preferred Pharmacy:   [facility] - [address]  [address]  [address]  [place] [address]  Phone: [phone] Fax: [phone]    [facility]  [address]  [place] [address]  Phone: [phone] Fax: [phone]    [facility] [place] - [address]  [address] [address]  Phone: [phone] Fax: [phone]    [facility] [place] - [address]  [address]  [facility] [address]  Phone: [phone] Fax: [phone]    Primary Care Provider: Gunnar Sylvester DO    Primary Insurance: [employer]  REP  Secondary Insurance: [place] [employer]    DISCHARGE DETAILS:    CM dept is still awaiting the Level II LOC determination form [facility] before SNF placement can occur.     TC placed to Pts legal guardian,  Ezra Morgan at PH: [phone] to discuss Pts eventual SNF placement as her group home cannot accept her  back until 15 staff members are trained on how to administer insulin. KOBY spoke with the  who will let him know I called.

## 2024-09-16 NOTE — ASSESSMENT & PLAN NOTE
Lab Results   Component Value Date    HGBA1C 6.2 (H) 08/13/2024     Recent Labs     09/15/24  1739 09/16/24  0031 09/16/24  0715 09/16/24  1112   POCGLU 126 170* 276* 315*     Blood Sugar Average: Last 72 hrs:  (P) 199.114585726655276    PMHx of diabetes. Pt having hyperglycemic episodes in the setting of tube feeds, possibly related to refeeding syndrome although pt has not had electrolyte abnormalities. Nutrition switched feed from Jevity 1.5 to Glucerna 1.2 for hyperglycemia, then to Nepro on 9/12 for hyperkalemia. Pt was doing well on 25u Lantus however became hypoglycemic; Lantus decreased to 18u on 9/6, then to 15u on 9/7. On 9/8 switched Lantus 15u to NPH 15u to prevent evening hypoglycemia in the setting of cyclic tube feeds; however pt still hyperglycemic w BG>200 in the AM.  9/11 NPH 15u evening and NPH 5u morning. 9/12 discontinued morning NPH d/t patient's mid-morning hypoglycemia. This AM pt hyperglycemic likely secondary to Nepro tube feed. Pt's recent hyperglycemia could also be attributed to new fever and new potential source of infection, workup pending. For now:     Continue NPH 20 units at 6PM, to be given when initiating tube feeds  Pt on Glucerna last night. One BS of 48. After tube feeds 248. Will place on SSI in daytime for now with NPH 20 units at night. If BS still fluctuating will consult Endo.  Hypoglycemia protocol in place

## 2024-09-16 NOTE — ASSESSMENT & PLAN NOTE
Pt noted to be hypotensive intermittently throughout hospitalization. Per caregivers and record review, pt's BP tends to run on softer side. Pt has hx of PD which could contribute to autonomic dysfunction as well. Pt's BP have been appropriate since initiation of Midodrine 5 mg TID.     Continue midodrine 5 mg TID  Compression stockings  VS q4h  Fluid intake per Nutrition, see plan as under dysphagia  MAPs remain above 65.

## 2024-09-16 NOTE — ASSESSMENT & PLAN NOTE
9/14 pt had new onset fevers, Tmax 102.1F. Symptomatic relief with Tylenol. Current workup is pending to rule-out source of fever. No leucocytosis or tachycardia today. Suspecting another aspiration pneumonia vs aspiration pneumonitis.    CXR, UA pending  Procal 0.11.  PRN Tylenol for fever

## 2024-09-17 ENCOUNTER — TELEPHONE (OUTPATIENT)
Dept: GASTROENTEROLOGY | Facility: CLINIC | Age: 64
End: 2024-09-17

## 2024-09-17 ENCOUNTER — TELEPHONE (OUTPATIENT)
Age: 64
End: 2024-09-17

## 2024-09-17 LAB
ANION GAP SERPL CALCULATED.3IONS-SCNC: 6 MMOL/L (ref 4–13)
BASOPHILS # BLD AUTO: 0.07 THOUSANDS/ΜL (ref 0–0.1)
BASOPHILS NFR BLD AUTO: 1 % (ref 0–1)
BUN SERPL-MCNC: 35 MG/DL (ref 5–25)
CALCIUM SERPL-MCNC: 9.8 MG/DL (ref 8.4–10.2)
CHLORIDE SERPL-SCNC: 97 MMOL/L (ref 96–108)
CO2 SERPL-SCNC: 35 MMOL/L (ref 21–32)
CREAT SERPL-MCNC: 1.09 MG/DL (ref 0.6–1.3)
EOSINOPHIL # BLD AUTO: 0.56 THOUSAND/ΜL (ref 0–0.61)
EOSINOPHIL NFR BLD AUTO: 8 % (ref 0–6)
ERYTHROCYTE [DISTWIDTH] IN BLOOD BY AUTOMATED COUNT: 13.8 % (ref 11.6–15.1)
GFR SERPL CREATININE-BSD FRML MDRD: 54 ML/MIN/1.73SQ M
GLUCOSE SERPL-MCNC: 108 MG/DL (ref 65–140)
GLUCOSE SERPL-MCNC: 137 MG/DL (ref 65–140)
GLUCOSE SERPL-MCNC: 162 MG/DL (ref 65–140)
GLUCOSE SERPL-MCNC: 168 MG/DL (ref 65–140)
GLUCOSE SERPL-MCNC: 170 MG/DL (ref 65–140)
GLUCOSE SERPL-MCNC: 198 MG/DL (ref 65–140)
GLUCOSE SERPL-MCNC: 214 MG/DL (ref 65–140)
GLUCOSE SERPL-MCNC: 58 MG/DL (ref 65–140)
GLUCOSE SERPL-MCNC: 77 MG/DL (ref 65–140)
HBV SURFACE AG SER QL: NORMAL
HCT VFR BLD AUTO: 32.6 % (ref 34.8–46.1)
HCV AB SER QL: NORMAL
HGB BLD-MCNC: 10.5 G/DL (ref 11.5–15.4)
HIV 1+2 AB+HIV1 P24 AG SERPL QL IA: NORMAL
HIV1 P24 AG SER QL: NORMAL
IMM GRANULOCYTES # BLD AUTO: 0.03 THOUSAND/UL (ref 0–0.2)
IMM GRANULOCYTES NFR BLD AUTO: 1 % (ref 0–2)
LYMPHOCYTES # BLD AUTO: 2.63 THOUSANDS/ΜL (ref 0.6–4.47)
LYMPHOCYTES NFR BLD AUTO: 40 % (ref 14–44)
MCH RBC QN AUTO: 33.3 PG (ref 26.8–34.3)
MCHC RBC AUTO-ENTMCNC: 32.2 G/DL (ref 31.4–37.4)
MCV RBC AUTO: 104 FL (ref 82–98)
MONOCYTES # BLD AUTO: 0.65 THOUSAND/ΜL (ref 0.17–1.22)
MONOCYTES NFR BLD AUTO: 10 % (ref 4–12)
NEUTROPHILS # BLD AUTO: 2.71 THOUSANDS/ΜL (ref 1.85–7.62)
NEUTS SEG NFR BLD AUTO: 40 % (ref 43–75)
NRBC BLD AUTO-RTO: 0 /100 WBCS
PLATELET # BLD AUTO: 212 THOUSANDS/UL (ref 149–390)
PMV BLD AUTO: 12.3 FL (ref 8.9–12.7)
POTASSIUM SERPL-SCNC: 4.9 MMOL/L (ref 3.5–5.3)
RBC # BLD AUTO: 3.15 MILLION/UL (ref 3.81–5.12)
SODIUM SERPL-SCNC: 138 MMOL/L (ref 135–147)
WBC # BLD AUTO: 6.65 THOUSAND/UL (ref 4.31–10.16)

## 2024-09-17 PROCEDURE — 86803 HEPATITIS C AB TEST: CPT

## 2024-09-17 PROCEDURE — 99222 1ST HOSP IP/OBS MODERATE 55: CPT | Performed by: INTERNAL MEDICINE

## 2024-09-17 PROCEDURE — 87340 HEPATITIS B SURFACE AG IA: CPT

## 2024-09-17 PROCEDURE — 87806 HIV AG W/HIV1&2 ANTB W/OPTIC: CPT

## 2024-09-17 PROCEDURE — 85025 COMPLETE CBC W/AUTO DIFF WBC: CPT

## 2024-09-17 PROCEDURE — 80048 BASIC METABOLIC PNL TOTAL CA: CPT

## 2024-09-17 PROCEDURE — 82948 REAGENT STRIP/BLOOD GLUCOSE: CPT

## 2024-09-17 PROCEDURE — 99232 SBSQ HOSP IP/OBS MODERATE 35: CPT | Performed by: HOSPITALIST

## 2024-09-17 RX ORDER — DEXTROSE MONOHYDRATE 25 G/50ML
INJECTION, SOLUTION INTRAVENOUS
Status: COMPLETED
Start: 2024-09-17 | End: 2024-09-17

## 2024-09-17 RX ORDER — DEXTROSE MONOHYDRATE 25 G/50ML
INJECTION, SOLUTION INTRAVENOUS
Status: COMPLETED
Start: 2024-09-17 | End: 2024-09-18

## 2024-09-17 RX ORDER — LEVOTHYROXINE SODIUM 25 UG/1
25 TABLET ORAL EVERY MORNING
Status: DISCONTINUED | OUTPATIENT
Start: 2024-09-18 | End: 2024-09-18

## 2024-09-17 RX ORDER — INSULIN LISPRO 100 [IU]/ML
1-5 INJECTION, SOLUTION INTRAVENOUS; SUBCUTANEOUS
Status: DISCONTINUED | OUTPATIENT
Start: 2024-09-17 | End: 2024-09-17

## 2024-09-17 RX ADMIN — DOCOSANOL: 100 CREAM TOPICAL at 22:03

## 2024-09-17 RX ADMIN — DOCOSANOL: 100 CREAM TOPICAL at 18:13

## 2024-09-17 RX ADMIN — ENOXAPARIN SODIUM 40 MG: 40 INJECTION SUBCUTANEOUS at 09:43

## 2024-09-17 RX ADMIN — BACLOFEN 10 MG: 10 TABLET ORAL at 09:26

## 2024-09-17 RX ADMIN — CARBIDOPA AND LEVODOPA 1 TABLET: 25; 100 TABLET ORAL at 18:13

## 2024-09-17 RX ADMIN — OXYBUTYNIN CHLORIDE 5 MG: 5 TABLET ORAL at 09:26

## 2024-09-17 RX ADMIN — CALCIUM CARBONATE (ANTACID) CHEW TAB 500 MG 750 MG: 500 CHEW TAB at 09:26

## 2024-09-17 RX ADMIN — FERROUS SULFATE 300 MG: 300 SOLUTION ORAL at 18:13

## 2024-09-17 RX ADMIN — LORATADINE 10 MG: 5 SOLUTION ORAL at 09:42

## 2024-09-17 RX ADMIN — VALPROIC ACID 125 MG: 250 SOLUTION ORAL at 22:03

## 2024-09-17 RX ADMIN — VALPROIC ACID 125 MG: 250 SOLUTION ORAL at 09:26

## 2024-09-17 RX ADMIN — CARBIDOPA AND LEVODOPA 1 TABLET: 25; 100 TABLET ORAL at 22:04

## 2024-09-17 RX ADMIN — OXYBUTYNIN CHLORIDE 5 MG: 5 TABLET ORAL at 22:04

## 2024-09-17 RX ADMIN — ARIPIPRAZOLE 2 MG: 2 TABLET ORAL at 22:03

## 2024-09-17 RX ADMIN — MIDODRINE HYDROCHLORIDE 5 MG: 5 TABLET ORAL at 18:13

## 2024-09-17 RX ADMIN — CALCIUM CARBONATE (ANTACID) CHEW TAB 500 MG 750 MG: 500 CHEW TAB at 22:04

## 2024-09-17 RX ADMIN — OXYCODONE HYDROCHLORIDE AND ACETAMINOPHEN 1000 MG: 500 TABLET ORAL at 09:26

## 2024-09-17 RX ADMIN — CARBIDOPA AND LEVODOPA 1 TABLET: 25; 100 TABLET ORAL at 12:27

## 2024-09-17 RX ADMIN — DOCOSANOL: 100 CREAM TOPICAL at 14:51

## 2024-09-17 RX ADMIN — LORAZEPAM 0.5 MG: 0.5 TABLET ORAL at 22:04

## 2024-09-17 RX ADMIN — Medication 20 MG: at 10:04

## 2024-09-17 RX ADMIN — MIDODRINE HYDROCHLORIDE 5 MG: 5 TABLET ORAL at 06:09

## 2024-09-17 RX ADMIN — BACLOFEN 10 MG: 10 TABLET ORAL at 22:04

## 2024-09-17 RX ADMIN — OXYBUTYNIN CHLORIDE 5 MG: 5 TABLET ORAL at 18:13

## 2024-09-17 RX ADMIN — INSULIN HUMAN 20 UNITS: 100 INJECTION, SUSPENSION SUBCUTANEOUS at 18:27

## 2024-09-17 RX ADMIN — METHENAMINE HIPPURATE 1 G: 1 TABLET ORAL at 09:27

## 2024-09-17 RX ADMIN — POLYETHYLENE GLYCOL 3350 17 G: 17 POWDER, FOR SOLUTION ORAL at 09:43

## 2024-09-17 RX ADMIN — LEVOTHYROXINE SODIUM 25 MCG: 25 TABLET ORAL at 09:26

## 2024-09-17 RX ADMIN — DOCOSANOL: 100 CREAM TOPICAL at 06:11

## 2024-09-17 RX ADMIN — PRAVASTATIN SODIUM 40 MG: 40 TABLET ORAL at 18:13

## 2024-09-17 RX ADMIN — DEXTROSE MONOHYDRATE 50 ML: 25 INJECTION, SOLUTION INTRAVENOUS at 00:32

## 2024-09-17 RX ADMIN — BACLOFEN 10 MG: 10 TABLET ORAL at 18:13

## 2024-09-17 RX ADMIN — CITALOPRAM HYDROBROMIDE 40 MG: 20 TABLET ORAL at 09:26

## 2024-09-17 RX ADMIN — METHENAMINE HIPPURATE 1 G: 1 TABLET ORAL at 18:26

## 2024-09-17 RX ADMIN — Medication 1000 UNITS: at 09:26

## 2024-09-17 RX ADMIN — FERROUS SULFATE 300 MG: 300 SOLUTION ORAL at 06:09

## 2024-09-17 RX ADMIN — ACETAMINOPHEN 650 MG: 325 TABLET ORAL at 00:37

## 2024-09-17 RX ADMIN — MIDODRINE HYDROCHLORIDE 5 MG: 5 TABLET ORAL at 12:27

## 2024-09-17 RX ADMIN — DOCOSANOL: 100 CREAM TOPICAL at 10:04

## 2024-09-17 RX ADMIN — CARBIDOPA AND LEVODOPA 1 TABLET: 25; 100 TABLET ORAL at 09:26

## 2024-09-17 NOTE — ASSESSMENT & PLAN NOTE
Lab Results   Component Value Date    HGBA1C 6.2 (H) 08/13/2024     Recent Labs     09/17/24  0100 09/17/24  0215 09/17/24  0600 09/17/24  1152   POCGLU 198* 168* 170* 214*     Blood Sugar Average: Last 72 hrs:  (P) 187.2543350276740196    Pt having hyperglycemic episodes in the setting of tube feeds, possibly related to refeeding syndrome although pt has not had electrolyte abnormalities. Nutrition switched feed from Jevity 1.5 to Glucerna 1.2 for hyperglycemia, then to Nepro on 9/12 for hyperkalemia. Pt was doing well on 25u Lantus however became hypoglycemic; Lantus decreased to 18u on 9/6, then to 15u on 9/7. On 9/8 switched Lantus 15u to NPH 15u to prevent evening hypoglycemia in the setting of cyclic tube feeds; however pt still hyperglycemic w BG>200 in the AM.  9/11 NPH 15u evening and NPH 5u morning. 9/12 discontinued morning NPH d/t patient's mid-morning hypoglycemia. This AM pt hyperglycemic likely secondary to Nepro tube feed. Pt's recent hyperglycemia could also be attributed to new fever and new potential source of infection, workup pending. For now:     Continue NPH 20 units at 6PM, to be given when initiating tube feeds. BG with better control last night 160-190s.  Pt on Glucerna at night. Will place on SSI in daytime for now with NPH 20 units at night.   Hypoglycemia protocol in place. Received 1/2 amp of dextrose yesterday for hypoglycemia.  Will place endo consult due to uncontrolled sugars.

## 2024-09-17 NOTE — QUICK NOTE
Updated patient's caregiver, Anna (who also works with Lisa) at bedside regarding plan of care.  Answered all questions and concerns.  She states that she will also update Lisa.

## 2024-09-17 NOTE — ASSESSMENT & PLAN NOTE
Lab Results   Component Value Date    HGBA1C 6.2 (H) 08/13/2024       Recent Labs     09/17/24  0100 09/17/24  0215 09/17/24  0600 09/17/24  1152   POCGLU 198* 168* 170* 214*       Blood Sugar Average: Last 72 hrs:  (P) 187.0481349092684116

## 2024-09-17 NOTE — PROGRESS NOTES
"Progress Note - Hospitalist   Name: Terrie Alicea 64 y.o. female I MRN: [id number]  Unit/Bed#: S -01 I Date of Admission: 8/24/2024   Date of Service: 9/17/2024 I Hospital Day: 24    Assessment & Plan  Dysphagia  - s/p PEG tube on 8/26 during this admission  - Per group home, pt attends day program which cannot manage tube feeds so requesting her to receive it from 6pm to 6am. Settings adjusted to take this into consideration per nutrition. Nutrition's recommendations to change tube feed from Glucerna to Nepro d/t lower potassium content of the latter. Originally switched from Glucerna to Nepro d/t elevated potassium; however pt tolerated Glucerna better as evidenced by glucose readings.     Plan:  Continue Glucerna 1.2 tube feeds  Per Nutrition:   \"70ml water before and after running the feeding, 70ml q 2 hours while feeding is running. 1263 mL total fee water daily with formula and flushes. Continue Glucerna 1.2 at 80ml/hr x 12 hours. 960mL glucerna, 1152 kcal daily.\"  Pt not to receive oral feeds due to risk of aspiration, evidenced by: episode previous to this hospitalization, during this hospitalization, and now current fever   Pt's group home insisting on attempting pleasure feeds despite education. However, given new fever and risk factors, we will not be ordering oral diet. Discussed this with group home at length.  DM (diabetes mellitus) ([facility])  Lab Results   Component Value Date    HGBA1C 6.2 (H) 08/13/2024     Recent Labs     09/17/24  0100 09/17/24  0215 09/17/24  0600 09/17/24  1152   POCGLU 198* 168* 170* 214*     Blood Sugar Average: Last 72 hrs:  (P) 187.0017047304348560    Pt having hyperglycemic episodes in the setting of tube feeds, possibly related to refeeding syndrome although pt has not had electrolyte abnormalities. Nutrition switched feed from Jevity 1.5 to Glucerna 1.2 for hyperglycemia, then to Nepro on 9/12 for hyperkalemia. Pt was doing well on 25u Lantus however became " hypoglycemic; Lantus decreased to 18u on 9/6, then to 15u on 9/7. On 9/8 switched Lantus 15u to NPH 15u to prevent evening hypoglycemia in the setting of cyclic tube feeds; however pt still hyperglycemic w BG>200 in the AM.  9/11 NPH 15u evening and NPH 5u morning. 9/12 discontinued morning NPH d/t patient's mid-morning hypoglycemia. This AM pt hyperglycemic likely secondary to Nepro tube feed. Pt's recent hyperglycemia could also be attributed to new fever and new potential source of infection, workup pending. For now:     Continue NPH 20 units at 6PM, to be given when initiating tube feeds. BG with better control last night 160-190s.  Pt on Glucerna at night. Will place on SSI in daytime for now with NPH 20 units at night.   Hypoglycemia protocol in place. Received 1/2 amp of dextrose yesterday for hypoglycemia.  Will place endo consult due to uncontrolled sugars.   Fever  9/14 pt had new onset fevers, Tmax 102.1F. Symptomatic relief with Tylenol. Current workup is pending to rule-out source of fever. No leucocytosis or tachycardia today. Suspecting another aspiration pneumonia vs aspiration pneumonitis.    CXR, UA WNL   Procal 0.11.  PRN Tylenol for fever  Afebrile over last 24 hrs.  Hypotension  Pt noted to be hypotensive intermittently throughout hospitalization. Per caregivers and record review, pt's BP tends to run on softer side. Pt has hx of PD which could contribute to autonomic dysfunction as well. Pt's BP have been appropriate since initiation of Midodrine 5 mg TID.     Continue midodrine 5 mg TID  Compression stockings  VS q4h  Fluid intake per Nutrition, see plan as under dysphagia  MAPs remain above 65.   Cerebral palsy (HCC)  Continue baclofen 10 mg TID  Incontinence  Continue oxybutynin 5 mg TID  UA WNL  Parkinson's disease  History of mood disturbances, previously on neuroleptics. Tardive dyskinesia on exam.    Sinemet  qid. Can defer changes to outpatient neurology.   Mood  disturbances  Continue home meds: Abilify 2 mg, Citalopram 40 mg.  Pt previously on trazodone 100 mg q HS which was discontinued on 8/30 while in the ICU d/t contributions to sedation.    Continue Depakene oral solution 125 mg q12h for mood, can defer changes per outpatient psychiatry as deemed necessary.  Pt has been stable from this standpoint throughout hospitalization, hasn't required restarting of trazodone or escalation in valproic acid thus far.  [facility] requested psychiatric evaluation/clearance for rehab placement d/t a chart history of bipolar and IDD. Per psychiatry, pt is stable on current regimen, no indication for inpatient psychiatric treatment, and pt is not an imminent risk of harm to herself or others.     VTE Pharmacologic Prophylaxis: VTE Score: 7 High Risk (Score >/= 5) - Pharmacological DVT Prophylaxis Ordered: enoxaparin (Lovenox). Sequential Compression Devices Ordered.    Mobility:   Basic Mobility Inpatient Raw Score: 6  JH-HLM Goal: 2: Bed activities/Dependent transfer  JH-HLM Achieved: 1: Laying in bed  JH-HLM Goal NOT achieved. Continue with multidisciplinary rounding and encourage appropriate mobility to improve upon JH-HLM goals.    Patient Centered Rounds: I performed bedside rounds with nursing staff today.   Discussions with Specialists or Other Care Team Provider: Endocrinology    Education and Discussions with Family / Patient: Will update care giver on phone    Current Length of Stay: 24 day(s)  Current Patient Status: Inpatient   Certification Statement: The patient will continue to require additional inpatient hospital stay due to BG  Discharge Plan: Anticipate discharge in 24-48 hrs to group home.    Code Status: Level 1 - Full Code    Subjective   Pt was seen and examined at bedside today. BG numbers were better controlled last night, but had one episode of 70s sugars. An 1/2 amp of dextrose was given during that time and sugar numbers improved. Otherwise BG in the 160s-190s.  Will consult Endo for input on better blood sugar control.    Objective     Vitals:   Temp (24hrs), Av.9 °F (37.2 °C), Min:98.5 °F (36.9 °C), Max:99.4 °F (37.4 °C)    Temp:  [98.5 °F (36.9 °C)-99.4 °F (37.4 °C)] 98.6 °F (37 °C)  HR:  [] 83  Resp:  [16-18] 16  BP: ()/(58-71) 127/71  SpO2:  [88 %-96 %] 94 %  Body mass index is 18.43 kg/m².     Input and Output Summary (last 24 hours):     Intake/Output Summary (Last 24 hours) at 2024 1422  Last data filed at 2024 0900  Gross per 24 hour   Intake 250 ml   Output 0 ml   Net 250 ml       Physical Exam  Constitutional:       General: She is awake. She is not in acute distress.     Appearance: Normal appearance.   HENT:      Head: Normocephalic and atraumatic.      Nose: Nose normal.      Mouth/Throat:      Mouth: Mucous membranes are moist.      Pharynx: Oropharynx is clear.   Eyes:      General: Lids are normal.      Conjunctiva/sclera: Conjunctivae normal.   Cardiovascular:      Rate and Rhythm: Normal rate and regular rhythm.      Heart sounds: Normal heart sounds.   Pulmonary:      Effort: Pulmonary effort is normal.      Breath sounds: Normal breath sounds.   Abdominal:      General: Bowel sounds are normal.      Palpations: Abdomen is soft.   Musculoskeletal:         General: Deformity present.      Comments: Chronic   Skin:     General: Skin is warm and dry.   Neurological:      General: No focal deficit present.      Mental Status: She is alert and oriented to person, place, and time.      Comments: Orofacial dystonic movements   Psychiatric:         Mood and Affect: Mood normal.         Behavior: Behavior normal. Behavior is cooperative.         Thought Content: Thought content normal.         Judgment: Judgment normal.        Lines/Drains:  Lines/Drains/Airways       Active Status       Name Placement date Placement time Site Days    Gastrostomy/Enterostomy  16 Fr. LUQ 24  1643  LUQ  21                            Lab Results: I  have reviewed the following results: Urinalysis:   Lab Results   Component Value Date    COLORU Light Yellow 09/16/2024    CLARITYU Clear 09/16/2024    SPECGRAV 1.019 09/16/2024    PHUR 6.5 09/16/2024    LEUKOCYTESUR Negative 09/16/2024    NITRITE Negative 09/16/2024    GLUCOSEU Negative 09/16/2024    KETONESU Negative 09/16/2024    BILIRUBINUR Negative 09/16/2024    BLOODU Negative 09/16/2024      Results from last 7 days   Lab Units 09/17/24  0628   WBC Thousand/uL 6.65   HEMOGLOBIN g/dL 10.5*   HEMATOCRIT % 32.6*   PLATELETS Thousands/uL 212   SEGS PCT % 40*   LYMPHO PCT % 40   MONO PCT % 10   EOS PCT % 8*     Results from last 7 days   Lab Units 09/17/24  0628   SODIUM mmol/L 138   POTASSIUM mmol/L 4.9   CHLORIDE mmol/L 97   CO2 mmol/L 35*   BUN mg/dL 35*   CREATININE mg/dL 1.09   ANION GAP mmol/L 6   CALCIUM mg/dL 9.8   GLUCOSE RANDOM mg/dL 162*         Results from last 7 days   Lab Units 09/17/24  1152 09/17/24  0600 09/17/24  0215 09/17/24  0100 09/17/24  0011 09/16/24  1822 09/16/24  1112 09/16/24  0715 09/16/24  0031 09/15/24  1739 09/15/24  1650 09/15/24  1649   POC GLUCOSE mg/dl 214* 170* 168* 198* 77 169* 315* 276* 170* 126 48* 45*         Results from last 7 days   Lab Units 09/15/24  0624   PROCALCITONIN ng/ml 0.11       Recent Cultures (last 7 days):         Imaging Review: Reviewed radiology reports from this admission including: chest xray.  Other Studies: No additional pertinent studies reviewed.    Last 24 Hours Medication List:     Current Facility-Administered Medications:     acetaminophen (TYLENOL) tablet 650 mg, Q6H PRN    ammonium lactate (LAC-HYDRIN) 12 % cream, BID PRN    ARIPiprazole (ABILIFY) tablet 2 mg, HS    artificial tear ophthalmic ointment, HS PRN    ascorbic acid (VITAMIN C) tablet 1,000 mg, Daily    baclofen tablet 10 mg, TID    calcium carbonate (TUMS) chewable tablet 750 mg, BID    carbamide peroxide (DEBROX) 6.5 % otic solution 2 drop, BID PRN    carbidopa-levodopa (SINEMET)   mg per tablet 1 tablet, 4x Daily    Cholecalciferol (VITAMIN D3) tablet 1,000 Units, Daily    citalopram (CeleXA) tablet 40 mg, Daily    dextromethorphan-guaiFENesin (ROBITUSSIN DM) oral syrup 10 mL, Q4H PRN    docosanol (ABREVA) 10 % cream, 5x Daily    enoxaparin (LOVENOX) subcutaneous injection 40 mg, Daily    Ferrous Sulfate oral syrup 300 mg, BID AC    fluticasone (FLONASE) 50 mcg/act nasal spray 1 spray, Daily PRN    hydrocortisone 1 % cream, 4x Daily PRN    insulin NPH (HumuLIN N,NovoLIN N) 100 Units/mL subcutaneous injection 20 Units, HS    Ketotifen Fumarate (ZADITOR) 0.035 % ophthalmic solution 1 drop, Daily PRN    levothyroxine tablet 25 mcg, QAM    Loratadine (CLARITIN) oral soln 10 mg, Daily PRN    LORazepam (ATIVAN) tablet 0.5 mg, HS    methenamine hippurate (HIPREX) tablet 1 g, BID    midodrine (PROAMATINE) tablet 5 mg, TID AC    omeprazole (PRILOSEC) suspension 2 mg/mL, Daily    oxybutynin (DITROPAN) tablet 5 mg, TID    polyethylene glycol (MIRALAX) packet 17 g, Daily    pravastatin (PRAVACHOL) tablet 40 mg, Daily With Dinner    valproic acid (DEPAKENE) oral soln 125 mg, Q12H ROSEMARY    Administrative Statements   Today, Patient Was Seen By: Yolanda Kay, DO  I have spent a total time of 15 minutes in caring for this patient on the day of the visit/encounter including Obtaining or reviewing history  .    Jaun Yoon, Medical student  **Please Note: This note may have been constructed using a voice recognition system.**

## 2024-09-17 NOTE — PLAN OF CARE
Problem: Nutrition/Hydration-ADULT  Goal: Nutrient/Hydration intake appropriate for improving, restoring or maintaining nutritional needs  Description: Monitor and assess patient's nutrition/hydration status for malnutrition. Collaborate with interdisciplinary team and initiate plan and interventions as ordered.  Monitor patient's weight and dietary intake as ordered or per policy. Utilize nutrition screening tool and intervene as necessary. Determine patient's food preferences and provide high-protein, high-caloric foods as appropriate.     INTERVENTIONS:  - Monitor oral intake, urinary output, labs, and treatment plans  - Assess nutrition and hydration status and recommend course of action  - Evaluate amount of meals eaten  - Assist patient with eating if necessary   - Allow adequate time for meals  - Recommend/ encourage appropriate diets, oral nutritional supplements, and vitamin/mineral supplements  - Order, calculate, and assess calorie counts as needed  - Recommend, monitor, and adjust tube feedings and TPN/PPN based on assessed needs  - Assess need for intravenous fluids  - Provide specific nutrition/hydration education as appropriate  - Include patient/family/caregiver in decisions related to nutrition  Outcome: Progressing     Problem: Potential for Falls  Goal: Patient will remain free of falls  Description: INTERVENTIONS:  - Educate patient/family on patient safety including physical limitations  - Instruct patient to call for assistance with activity   - Consult OT/PT to assist with strengthening/mobility   - Keep Call bell within reach  - Keep bed low and locked with side rails adjusted as appropriate  - Keep care items and personal belongings within reach  - Initiate and maintain comfort rounds  - Make Fall Risk Sign visible to staff  - Offer Toileting every  Hours, in advance of need  - Initiate/Maintain alarm  - Obtain necessary fall risk management equipment:  - Apply yellow socks and bracelet  for high fall risk patients  - Consider moving patient to room near nurses station  Outcome: Progressing     Problem: GASTROINTESTINAL - ADULT  Goal: Minimal or absence of nausea and/or vomiting  Description: INTERVENTIONS:  - Administer IV fluids if ordered to ensure adequate hydration  - Maintain NPO status until nausea and vomiting are resolved  - Nasogastric tube if ordered  - Administer ordered antiemetic medications as needed  - Provide nonpharmacologic comfort measures as appropriate  - Advance diet as tolerated, if ordered  - Consider nutrition services referral to assist patient with adequate nutrition and appropriate food choices  Outcome: Progressing  Goal: Maintains or returns to baseline bowel function  Description: INTERVENTIONS:  - Assess bowel function  - Encourage oral fluids to ensure adequate hydration  - Administer IV fluids if ordered to ensure adequate hydration  - Administer ordered medications as needed  - Encourage mobilization and activity  - Consider nutritional services referral to assist patient with adequate nutrition and appropriate food choices  Outcome: Progressing  Goal: Maintains adequate nutritional intake  Description: INTERVENTIONS:  - Monitor percentage of each meal consumed  - Identify factors contributing to decreased intake, treat as appropriate  - Assist with meals as needed  - Monitor I&O, weight, and lab values if indicated  - Obtain nutrition services referral as needed  Outcome: Progressing  Goal: Oral mucous membranes remain intact  Description: INTERVENTIONS  - Assess oral mucosa and hygiene practices  - Implement preventative oral hygiene regimen  - Implement oral medicated treatments as ordered  - Initiate Nutrition services referral as needed  Outcome: Progressing

## 2024-09-17 NOTE — TELEPHONE ENCOUNTER
"Spoke with caregiver Lisa she wanted to know if she could get feed flushed bag for pt omni pump. I spoke with Lebron from [employer] she confirmed that for [facility] pt we can get the feed flush bags for the omin it just has to say \"feed flush bags for omni pump in the order.\" I called Lisa back to make her aware.   "

## 2024-09-17 NOTE — ASSESSMENT & PLAN NOTE
9/14 pt had new onset fevers, Tmax 102.1F. Symptomatic relief with Tylenol. Current workup is pending to rule-out source of fever. No leucocytosis or tachycardia today. Suspecting another aspiration pneumonia vs aspiration pneumonitis.    CXR, UA WNL   Procal 0.11.  PRN Tylenol for fever  Afebrile over last 24 hrs.

## 2024-09-17 NOTE — CASE MANAGEMENT
Case Management Discharge Planning Note    Patient name Terrie Alicea  Location S /S -01 MRN [id number]  : 1960 Date 2024       Current Admission Date: 2024  Current Admission Diagnosis:Dysphagia   Patient Active Problem List    Diagnosis Date Noted Date Diagnosed    Fever 09/15/2024     Mood disturbances 2024     Severe protein-calorie malnutrition (HCC) 2024     Functional quadriplegia ([facility]) 2024     Protein-calorie malnutrition, unspecified severity ([facility]) 2024     Hospital discharge follow-up 2024     SIRS (systemic inflammatory response syndrome) ([facility]) 2024     Developmental delay      Preop examination 2023     Cataract 2023     Internal hemorrhoids 2023     Contracture of right hand 2023     Need for shingles vaccine 2023     Viral illness 2022     Dystonic cerebral palsy ([facility]) 2022     Cervical dystonia 2022     Parkinson's disease 2022     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[facility]) 10/18/2021     Polyneuropathy associated with underlying disease ([facility]) 10/18/2021     Hypercalcemia 2021     Herpes zoster without complication 2021     Abnormal finding on lung imaging 2021     Anemia 2020     Eosinophilic leukocytosis 2020     Underweight 2020     Dysphagia 2020     History of vitamin D deficiency 2020     History of fall 10/28/2019     Breast asymmetry 2019     Hypernatremia 2018     Severe sepsis ([facility]) 2018     Gait disturbance 2018     Osteoarthritis of both hips 2018     Severe Intellectual disability 2018     DM (diabetes mellitus) ([facility]) 2018     Abnormal albumin 2017     Peripheral neuropathy 2017     Seasonal allergies 2017     Physical deconditioning 2017     Hyperlipidemia 2017     Gastroesophageal reflux disease 2017     Cerebral palsy ([facility]) 2017     Spasticity  03/27/2017     Ambulatory dysfunction 03/27/2017     Bipolar disorder (HCC) 03/27/2017     Hypotension 04/11/2016     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/2016     Weight loss 01/21/2016     Osteoporosis 01/19/2016     Resting tremor 01/19/2016     Acute metabolic encephalopathy 12/11/2015     Gait abnormality 12/11/2015     Platelets decreased (HCC) 07/31/2015     Other chronic pain 07/30/2015     Postmenopausal vaginal bleeding 07/23/2015     Anxiety 07/15/2015     Menopause 09/25/2014     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/28/2014     Atopic dermatitis 06/02/2014     Dry eye 06/02/2014     Constipation 04/12/2013     Iron deficiency anemia 03/27/2013     Incontinence 03/27/2013     Hypothyroidism 01/10/2013       LOS (days): 24  Geometric Mean LOS (GMLOS) (days):   Days to GMLOS:     OBJECTIVE:  Risk of Unplanned Readmission Score: 42.1         Current admission status: Inpatient   Preferred Pharmacy:   [facility] - [address]  [address]  [address]  [place] [address]  Phone: [phone] Fax: [phone]    [facility]  [address]  [place] [address]  Phone: [phone] Fax: [phone]    [facility] [place] - [address]  [address] [address]  Phone: [phone] Fax: [phone]    [facility] [place], [place] - [address]  [address]  [facility] [address]  Phone: [phone] Fax: [phone]    Primary Care Provider: Gunnar Sylvester DO    Primary Insurance: [employer]  REP  Secondary Insurance: [place] [employer]    DISCHARGE DETAILS:    Level II LOC Determination received form [facility]. Pt is Nursing Facility Clinically Eligible.      KOBY spoke with Pts legal guardian,  Ezra Morgan.  He is aware that the Pt will need temporary SNF placement. Pts legal guardian has selected  [facility], reserved in [place].     Pt will need Insurance auth prior to discharge.  Insurance auth tasked to CM DC Support.

## 2024-09-17 NOTE — CASE MANAGEMENT
[facility] received request for authorization from Care Manager.  Authorization request submitted for: SNF  Facility Name: [facility]  NPI: [id number]   Facility MD:   Hamilton Sharma  NPI: [id number]  Authorization initiated by contacting insurance:  [employer]  Via: H&CC Portal   Clinicals submitted via Portal attachment   Pending Reference #: [id number]     Care Manager notified: Donna Pelletier    Updates to authorization status will be noted in chart. Please reach out to CM for updates on any clinical information.

## 2024-09-17 NOTE — CONSULTS
ASSESSMENT  Type 2 Diabetes with hyperglycemia     PLAN  Check residual feeds 6 am tomorrow due to concerns of gastroparesis  Stop NPH insulin   Post void residual urine volume   Regular insulin 4 units every 6hrs   Give levothyroxine 1hr before tube feeds.   Give correctional insulin algorithm 1 with regular insulin every 6hrs        History of Present Illness   Terrie Alicea is a 64 y.o. female who presents 64yr old patient with dysphagia, DM, parkinson's disease been treated for UTI, poor oral intake, malnutrition requiring gastrostomy tubes placement on 08/26.   She was diagnosed with type 2 DM over 16years ago, has been on metformin 1000mg twice daily and Amaryl 1mg daily. She lives in a group home and is compliant with her medications, with no known symptoms of medication side effects.Has been on NPH 20mg since admission. Blood glucose is ranging from 160 to 300. Most recent HBA1C on 08/13 is 6.2.   Prior to admission, her glucose has been ranging from 60's to 90's as it's been checked 3 times per week.   No past histories of diabetic emergencies. Patient has been hypotensive and has been placed on midodrine during this admission.   She sees the eye doctor every 6 months and the podiatry every 10 weeks.   Has no history of eye disease but care givers are concerned she might have developed some visual problems during this admission. Has an eGFR of 55 ( stage 3A CKD). Has trace proteinuria.Has no history of hypertension, stroke, CVDx. Patient takes simvastatin 20 mg daily, last lipid panel done was 2years ago with triglycerides of 198 and HDL of 46.   DIET: Patient has had dysphagia for the past 4years and has been on 1500 jerry ADA diet puree with honey thick liquids, has been placed on tube feeds during this admission    FHX: diabetes in her sister    O/E: tardive diskinesia

## 2024-09-17 NOTE — ASSESSMENT & PLAN NOTE
"- s/p PEG tube on 8/26 during this admission  - Per group home, pt attends day program which cannot manage tube feeds so requesting her to receive it from 6pm to 6am. Settings adjusted to take this into consideration per nutrition. Nutrition's recommendations to change tube feed from Glucerna to Nepro d/t lower potassium content of the latter. Originally switched from Glucerna to Nepro d/t elevated potassium; however pt tolerated Glucerna better as evidenced by glucose readings.     Plan:  Continue Glucerna 1.2 tube feeds  Per Nutrition:   \"70ml water before and after running the feeding, 70ml q 2 hours while feeding is running. 1263 mL total fee water daily with formula and flushes. Continue Glucerna 1.2 at 80ml/hr x 12 hours. 960mL glucerna, 1152 kcal daily.\"  Pt not to receive oral feeds due to risk of aspiration, evidenced by: episode previous to this hospitalization, during this hospitalization, and now current fever   Pt's group home insisting on attempting pleasure feeds despite education. However, given new fever and risk factors, we will not be ordering oral diet. Discussed this with group home at length.  "

## 2024-09-18 LAB
ANION GAP SERPL CALCULATED.3IONS-SCNC: 6 MMOL/L (ref 4–13)
BUN SERPL-MCNC: 35 MG/DL (ref 5–25)
CALCIUM SERPL-MCNC: 9.6 MG/DL (ref 8.4–10.2)
CHLORIDE SERPL-SCNC: 98 MMOL/L (ref 96–108)
CO2 SERPL-SCNC: 34 MMOL/L (ref 21–32)
CREAT SERPL-MCNC: 1 MG/DL (ref 0.6–1.3)
GFR SERPL CREATININE-BSD FRML MDRD: 60 ML/MIN/1.73SQ M
GLUCOSE SERPL-MCNC: 106 MG/DL (ref 65–140)
GLUCOSE SERPL-MCNC: 108 MG/DL (ref 65–140)
GLUCOSE SERPL-MCNC: 113 MG/DL (ref 65–140)
GLUCOSE SERPL-MCNC: 136 MG/DL (ref 65–140)
GLUCOSE SERPL-MCNC: 137 MG/DL (ref 65–140)
GLUCOSE SERPL-MCNC: 178 MG/DL (ref 65–140)
GLUCOSE SERPL-MCNC: 197 MG/DL (ref 65–140)
GLUCOSE SERPL-MCNC: 225 MG/DL (ref 65–140)
POTASSIUM SERPL-SCNC: 4.7 MMOL/L (ref 3.5–5.3)
SODIUM SERPL-SCNC: 138 MMOL/L (ref 135–147)

## 2024-09-18 PROCEDURE — 80048 BASIC METABOLIC PNL TOTAL CA: CPT

## 2024-09-18 PROCEDURE — 99232 SBSQ HOSP IP/OBS MODERATE 35: CPT | Performed by: INTERNAL MEDICINE

## 2024-09-18 PROCEDURE — 99232 SBSQ HOSP IP/OBS MODERATE 35: CPT | Performed by: HOSPITALIST

## 2024-09-18 PROCEDURE — 82948 REAGENT STRIP/BLOOD GLUCOSE: CPT

## 2024-09-18 RX ORDER — LEVOTHYROXINE SODIUM 25 UG/1
25 TABLET ORAL DAILY
Status: DISCONTINUED | OUTPATIENT
Start: 2024-09-18 | End: 2024-09-19 | Stop reason: HOSPADM

## 2024-09-18 RX ADMIN — MIDODRINE HYDROCHLORIDE 5 MG: 5 TABLET ORAL at 09:17

## 2024-09-18 RX ADMIN — CARBIDOPA AND LEVODOPA 1 TABLET: 25; 100 TABLET ORAL at 17:45

## 2024-09-18 RX ADMIN — FERROUS SULFATE 300 MG: 300 SOLUTION ORAL at 16:45

## 2024-09-18 RX ADMIN — Medication 20 MG: at 09:23

## 2024-09-18 RX ADMIN — CARBIDOPA AND LEVODOPA 1 TABLET: 25; 100 TABLET ORAL at 09:17

## 2024-09-18 RX ADMIN — MIDODRINE HYDROCHLORIDE 5 MG: 5 TABLET ORAL at 14:10

## 2024-09-18 RX ADMIN — FERROUS SULFATE 300 MG: 300 SOLUTION ORAL at 09:18

## 2024-09-18 RX ADMIN — MIDODRINE HYDROCHLORIDE 5 MG: 5 TABLET ORAL at 16:51

## 2024-09-18 RX ADMIN — DOCOSANOL: 100 CREAM TOPICAL at 09:19

## 2024-09-18 RX ADMIN — METHENAMINE HIPPURATE 1 G: 1 TABLET ORAL at 09:18

## 2024-09-18 RX ADMIN — CARBIDOPA AND LEVODOPA 1 TABLET: 25; 100 TABLET ORAL at 14:10

## 2024-09-18 RX ADMIN — ARIPIPRAZOLE 2 MG: 2 TABLET ORAL at 21:29

## 2024-09-18 RX ADMIN — ENOXAPARIN SODIUM 40 MG: 40 INJECTION SUBCUTANEOUS at 09:16

## 2024-09-18 RX ADMIN — INSULIN HUMAN 4 UNITS: 100 INJECTION, SOLUTION PARENTERAL at 23:26

## 2024-09-18 RX ADMIN — CITALOPRAM HYDROBROMIDE 40 MG: 20 TABLET ORAL at 09:16

## 2024-09-18 RX ADMIN — INSULIN HUMAN 4 UNITS: 100 INJECTION, SOLUTION PARENTERAL at 17:52

## 2024-09-18 RX ADMIN — VALPROIC ACID 125 MG: 250 SOLUTION ORAL at 09:17

## 2024-09-18 RX ADMIN — DOCOSANOL: 100 CREAM TOPICAL at 21:29

## 2024-09-18 RX ADMIN — OXYCODONE HYDROCHLORIDE AND ACETAMINOPHEN 1000 MG: 500 TABLET ORAL at 09:16

## 2024-09-18 RX ADMIN — VALPROIC ACID 125 MG: 250 SOLUTION ORAL at 21:29

## 2024-09-18 RX ADMIN — Medication 1000 UNITS: at 09:18

## 2024-09-18 RX ADMIN — LORAZEPAM 0.5 MG: 0.5 TABLET ORAL at 21:30

## 2024-09-18 RX ADMIN — CARBIDOPA AND LEVODOPA 1 TABLET: 25; 100 TABLET ORAL at 21:30

## 2024-09-18 RX ADMIN — METHENAMINE HIPPURATE 1 G: 1 TABLET ORAL at 17:47

## 2024-09-18 RX ADMIN — BACLOFEN 10 MG: 10 TABLET ORAL at 16:44

## 2024-09-18 RX ADMIN — PRAVASTATIN SODIUM 40 MG: 40 TABLET ORAL at 16:47

## 2024-09-18 RX ADMIN — LEVOTHYROXINE SODIUM 25 MCG: 25 TABLET ORAL at 17:48

## 2024-09-18 RX ADMIN — DEXTROSE MONOHYDRATE 25 ML: 25 INJECTION, SOLUTION INTRAVENOUS at 00:01

## 2024-09-18 RX ADMIN — BACLOFEN 10 MG: 10 TABLET ORAL at 09:16

## 2024-09-18 RX ADMIN — OXYBUTYNIN CHLORIDE 5 MG: 5 TABLET ORAL at 21:30

## 2024-09-18 RX ADMIN — DOCOSANOL: 100 CREAM TOPICAL at 17:46

## 2024-09-18 RX ADMIN — CALCIUM CARBONATE (ANTACID) CHEW TAB 500 MG 750 MG: 500 CHEW TAB at 09:16

## 2024-09-18 RX ADMIN — OXYBUTYNIN CHLORIDE 5 MG: 5 TABLET ORAL at 09:16

## 2024-09-18 RX ADMIN — BACLOFEN 10 MG: 10 TABLET ORAL at 21:30

## 2024-09-18 RX ADMIN — INSULIN HUMAN 1 UNITS: 100 INJECTION, SOLUTION PARENTERAL at 23:29

## 2024-09-18 RX ADMIN — CALCIUM CARBONATE (ANTACID) CHEW TAB 500 MG 750 MG: 500 CHEW TAB at 21:30

## 2024-09-18 RX ADMIN — OXYBUTYNIN CHLORIDE 5 MG: 5 TABLET ORAL at 16:51

## 2024-09-18 NOTE — PROGRESS NOTES
Progress Note - Endocrinology   Name: Terrie Alicea 64 y.o. female I MRN: [id number]  Unit/Bed#: S -01 I Date of Admission: 8/24/2024   Date of Service: 9/18/2024 I Hospital Day: 25     Assessment & Plan  Diabetes mellitus type 2, noninsulin dependent (HCC)  A1c (8/2024): 6.2%  - home regimen: Metformin 1000 BID, Glimepiride 1 mg daily  - Tube feeds via PEG: cyclic (q12hrs); Glucerna 1.2, 80 mL/hr  - currently on Regular Insulin 4 units every 6 hours with correctional scale (regular insulin, A1)  - will continue to follow and adjust as needed    Other specified hypothyroidism  TSH (8/2024): 1.27  - continue home Levothyroxine 25 mcg (one hour prior to first meal of the day)    History of Present Illness     Subjective :   Patient is non-verbal and does not interact; unable to obtain ROS    Objective      Temp:  [97.7 °F (36.5 °C)-98.8 °F (37.1 °C)] 97.7 °F (36.5 °C)  HR:  [65-93] 65  Resp:  [16-18] 16  BP: ()/(56-68) 104/68  O2 Device: None (Room air)          I/O last 24 hours:  In: 4420 [NG/GT:1090; Feedings:3330]  Out: 209 [Urine:209]  Lines/Drains/Airways       Active Status       Name Placement date Placement time Site Days    Gastrostomy/Enterostomy  16 Fr. LUQ 08/26/24  1643  LUQ  22                  Physical Exam  Constitutional:       General: She is not in acute distress.     Comments: Awake  Thin, diffuse muscle wasting   HENT:      Head: Normocephalic and atraumatic.      Nose: Nose normal.      Mouth/Throat:      Mouth: Mucous membranes are dry.      Pharynx: Oropharynx is clear.   Eyes:      General: Lids are normal.      Extraocular Movements: Extraocular movements intact.      Conjunctiva/sclera: Conjunctivae normal.      Comments: No pallor   Cardiovascular:      Rate and Rhythm: Normal rate and regular rhythm.      Pulses: Normal pulses.   Pulmonary:      Effort: Pulmonary effort is normal.   Abdominal:      General: Abdomen is flat.      Palpations: Abdomen is soft.      Comments:  Gastrostomy tube in place, no leakage noted around site. Skin appears intact, no erythema or breakdown noted.    Musculoskeletal:      Comments: Bilateral upper arm stiffness, with hands contracted into fists   Feet:      Comments: Lower extremity stiffness  Skin:     General: Skin is warm and dry.      Capillary Refill: Capillary refill takes 2 to 3 seconds.   Neurological:      Comments: Non-verbal. Non-interactive.   Persistent Tardive Dyskinesia (tongue)          Lab Results: I have reviewed the following results: CBC/BMP:   .     09/18/24  0552   SODIUM 138   K 4.7   CL 98   CO2 34*   BUN 35*   CREATININE 1.00   GLUC 106    , Creatinine Clearance: Estimated Creatinine Clearance: 37.4 mL/min (by C-G formula based on SCr of 1 mg/dL)., TSH:         Administrative Statements

## 2024-09-18 NOTE — ASSESSMENT & PLAN NOTE
Home med: Levothyroxine 25 mcg    Plan:  Per endocrinology, continue home levothyroxine at 1700, 1 hour prior to starting tube feeds

## 2024-09-18 NOTE — ASSESSMENT & PLAN NOTE
9/14 pt had new onset fevers, Tmax 102.1F. Symptomatic relief with Tylenol. Current workup is pending to rule-out source of fever. No leucocytosis or tachycardia today. Suspecting another aspiration pneumonia vs aspiration pneumonitis.    CXR, UA WNL   Procal 0.11.  PRN Tylenol for fever  Afebrile over last 48 hrs

## 2024-09-18 NOTE — CASE MANAGEMENT
Case Management Progress Note    Patient name Terrie Alicea  Location S /S -01 MRN [id number]  : 1960 Date 2024       LOS (days): 25  Geometric Mean LOS (GMLOS) (days):   Days to GMLOS:        OBJECTIVE:        Current admission status: Inpatient  Preferred Pharmacy:   [facility] - [address]  [address]  [address]  [place] [address]  Phone: [phone] Fax: [phone]    [facility]  [address]  [place] [address]  Phone: [phone] Fax: [phone]    [facility] [place], [place] - [address]  [address] [facility] [address]  Phone: [phone] Fax: [phone]    [facility] [place] - [address]  [address]  [place] [address]  Phone: [phone] Fax: [phone]    Primary Care Provider: Gunnar Sylvester DO    Primary Insurance: [employer]ICARE [facility]  Secondary Insurance: [place] [employer]    PROGRESS NOTE:    Weekly Care Management Length of Stay Review     Current LOS: 25 Days    Most Recent Labs:     Lab Results   Component Value Date/Time    WBC 6.65 2024 06:28 AM    HGB 10.5 (L) 2024 06:28 AM    HCT 32.6 (L) 2024 06:28 AM     2024 06:28 AM    SODIUM 138 2024 05:52 AM    K 4.7 2024 05:52 AM    CL 98 2024 05:52 AM    CO2 34 (H) 2024 05:52 AM    BUN 35 (H) 2024 05:52 AM    CREATININE 1.00 2024 05:52 AM    GLUC 106 2024 05:52 AM       Most Recent Vitals:   Vitals:    24 0739   BP: 104/68   Pulse: 65   Resp: 16   Temp: 97.7 °F (36.5 °C)   SpO2: 93%        Identified Barriers to Discharge/Discharge Goals/Care Management Interventions: dysphagia, medically stable.     Intended Discharge Disposition: Group home needs training on administrating insulin. Level 2 completed-for SNF training while awaiting group home training.   [facility]-auth pending     Expected Discharge Date:  24-48hrs pending auth

## 2024-09-18 NOTE — ASSESSMENT & PLAN NOTE
TSH (8/2024): 1.27  - continue home Levothyroxine 25 mcg (one hour prior to first meal of the day)

## 2024-09-18 NOTE — CASE MANAGEMENT
[facility] has received APPROVED authorization.  Insurance:   [employer]   Authorization received for: SNF  Facility: [facility]   Authorization #: [id number]   Start of Care: 9/18  Next Review Date: 9/20  Continued Stay Care Coordinator:  none given  Submit next review to: H & CC Portal or F: [phone]     Care Manager notified: Donna Pelletier    Please reach out to  for updates on any clinical information.

## 2024-09-18 NOTE — UTILIZATION REVIEW
Continued Stay Review    Date: 9/12/24                          Current Patient Class: Inpatient  Current Level of Care: Med Surg    HPI:64 y.o. female initially admitted on 8/24    Assessment/Plan: s/p peg tube placement on 8/26. Continue TF from 6 pm to 6 am. Continues with hypoglycemia during the day - will stop morning NPH. Due to hyperkalemia - d/w nutritionist who recommend changing TF to nepro from glucerna.      Vital Signs (last 3 days)       Date/Time Temp Pulse Resp BP MAP (mmHg) SpO2 O2 Device Patient Position - Orthostatic VS [place] Coma Scale Score Pain    09/18/24 07:39:40 97.7 °F (36.5 °C) 65 16 104/68 80 93 % -- -- -- --    09/18/24 02:13:04 98.8 °F (37.1 °C) 78 18 105/58 74 96 % -- -- 9 No Pain    09/17/24 23:16:41 98.8 °F (37.1 °C) 93 18 104/56 72 96 % -- -- -- --    09/17/24 21:22:29 -- 77 17 91/59 70 87 % -- -- -- --    09/17/24 15:12:16 -- 68 16 86/57 67 96 % -- -- -- --    09/17/24 12:32:19 -- 83 -- -- -- 94 % -- -- -- --    09/17/24 0800 -- -- -- -- -- -- -- -- -- No Pain    09/17/24 07:28:07 98.6 °F (37 °C) 68 16 127/71 90 96 % -- -- -- --    09/17/24 0037 -- -- -- -- -- -- -- -- -- Med Not Given for Pain - for MAR use only    09/17/24 00:30:11 99.4 °F (37.4 °C) 76 -- -- -- 88 % -- -- -- --    09/16/24 2300 -- -- -- 108/58 -- -- -- Lying -- --    09/16/24 21:55:23 99 °F (37.2 °C) 82 -- 93/58 70 95 % None (Room air) Lying -- --    09/16/24 2030 -- -- -- -- -- -- None (Room air) -- 9 No Pain    09/16/24 16:08:07 98.5 °F (36.9 °C) 103 18 128/71 90 95 % -- -- -- --    09/16/24 12:42:58 99.2 °F (37.3 °C) 100 -- 119/78 92 95 % -- -- -- --    09/16/24 12:42:09 -- 101 -- 119/78 92 96 % -- -- -- --    09/16/24 10:32:37 97.5 °F (36.4 °C) 94 -- -- -- 96 % -- -- -- --    09/16/24 0800 -- -- -- -- -- -- -- -- -- No Pain    09/16/24 07:06:25 99.3 °F (37.4 °C) 82 17 116/73 87 94 % None (Room air) Lying -- --    09/16/24 06:10:54 98.9 °F (37.2 °C) 89 -- 109/73 85 94 % -- -- -- --    09/15/24 23:11:48 98.5  °F (36.9 °C) 64 16 93/59 70 97 % -- -- -- --    09/15/24 23:10:57 -- 63 -- 93/59 70 99 % -- -- -- --    09/15/24 2015 -- -- -- -- -- -- None (Room air) -- 9 No Pain    09/15/24 14:29:36 98 °F (36.7 °C) 74 -- 126/75 92 95 % -- -- -- --    09/15/24 11:38:58 100.1 °F (37.8 °C) 90 18 130/86 101 97 % None (Room air) Lying -- --    09/15/24 09:50:36 99.4 °F (37.4 °C) 84 -- -- -- 94 % -- -- -- --    09/15/24 07:03:05 101 °F (38.3 °C) 95 18 144/94 111 94 % None (Room air) Lying -- --    09/15/24 0702 100.9 °F (38.3 °C) -- -- -- -- -- -- -- -- --    09/15/24 0600 101.9 °F (38.8 °C) 96 20 150/93 112 94 % None (Room air) Lying -- --          Weight (last 2 days)       Date/Time Weight    09/18/24 0600 40.6 (89.51)    09/17/24 0600 40 (88.18)    09/16/24 0600 41.1 (90.61)              Pertinent Labs/Diagnostic Results:   Radiology:  XR chest portable   Final Interpretation by Aden Reyna MD (09/16 1041)      No acute cardiopulmonary disease.            Workstation performed: [id number]         CT chest abdomen pelvis w contrast   Final Interpretation by Aron Ugalde MD (09/04 0846)      1. New opacification of the right lower lobe bronchus with sequela of aspiration in the right lower lobe.      2. No acute findings in the abdomen and pelvis.      3. There is an indeterminate 1.0 cm left renal interpolar lesion, for which dedicated renal sonogram is recommended on a nonemergent basis if not previously performed.      4. Multiple indeterminate nodular densities in the right breast, possibly reflecting sequela of trauma/fat necrosis. Recommend correlation with clinical history and consideration for dedicated breast imaging, as clinically appropriate.      5. Postsurgical sequela related to gastrostomy tube placement.      The study was marked in EPIC for immediate notification.               Workstation performed: [id number]         XR chest portable   Final Interpretation by Carlos Espinoza MD (09/03 0635)       No acute cardiopulmonary disease.            Workstation performed: [id number]         CT abdomen pelvis wo contrast   Final Interpretation by Sherine Gibbons MD (08/30 0710)      Findings compatible with open PEG tube placement. No definite perforation identified.      Persistent findings of cystitis.      Increased dependent lung opacities, usually atelectasis, but cannot exclude consolidation.            Workstation performed: [id number]         XR abdomen 1 view kub   Final Interpretation by Kp Gayle MD (08/30 0948)      Nonobstructive bowel gas pattern. Large volume of stool suggestive of constipation.               Workstation performed: [id number]         XR chest portable ICU   Final Interpretation by Kp Gayle MD (08/30 0947)      No acute cardiopulmonary disease.            Workstation performed: [id number]         CT soft tissue neck with contrast   Final Interpretation by Prudencio Rowan MD (08/24 1531)      No acute finding in the neck by CT. See additional findings above.            Workstation performed: [id number]         CT chest abdomen pelvis w contrast   Final Interpretation by Prudencio Rowan MD (08/24 1524)      Cystitis with wall thickening the urinary bladder and mucosal enhancement. No findings of upper urinary tract infection.      Distention of the colon probably representing chronic colonic pseudoobstruction or adynamic colonic ileus.      Decubitus pressure change in the subcutaneous tissues along the bilateral buttocks.      Increasing dependent density in the lungs probably representing increasing atelectasis and possible superimposed aspiration. Early aspiration pneumonia is not excluded.      Layering secretions in the trachea      Focus of hyperenhancement along the anterior lower uterine segment. Differential diagnosis would include a cervical mass or fibroid. Correlation with pelvic ultrasound or MRI may be considered.      The study was  marked in EPIC for immediate notification.      Workstation performed: [id number]         XR chest 1 view portable   ED Interpretation by Jamin Castro DO (08/24 1002)   No infiltrate now apparent      Final Interpretation by Ezra Kaur MD (08/24 1216)      Clear lungs with interval resolution of the right basilar airspace opacity.            Workstation performed: [id number]           Cardiology:  ECG 12 lead   Final Result by Anderson Gasca MD (08/30 0921)   Normal sinus rhythm   Rightward axis   Nonspecific ST abnormality   Abnormal ECG   When compared with ECG of 20-AUG-2024 12:24,   Vent. rate has decreased BY  44 BPM   Confirmed by Anderson Gasca ([id number]) on 8/30/2024 9:21:05 AM        GI:  No orders to display           Results from last 7 days   Lab Units 09/17/24  0628 09/16/24  0621 09/15/24  0624 09/14/24  0542 09/13/24  1309   WBC Thousand/uL 6.65 6.37 6.44 5.96 5.86   HEMOGLOBIN g/dL 10.5* 10.3* 9.9* 9.1* 9.2*   HEMATOCRIT % 32.6* 32.0* 31.5* 29.0* 29.1*   PLATELETS Thousands/uL 212 215 198 212 213   TOTAL NEUT ABS Thousands/µL 2.71 2.87 4.22  --   --          Results from last 7 days   Lab Units 09/18/24  0552 09/17/24  0628 09/16/24  0621 09/15/24  0624 09/14/24  0542   SODIUM mmol/L 138 138 137 137 140   POTASSIUM mmol/L 4.7 4.9 4.8 4.5 4.8   CHLORIDE mmol/L 98 97 97 98 99   CO2 mmol/L 34* 35* 35* 29 38*   ANION GAP mmol/L 6 6 5 10 3*   BUN mg/dL 35* 35* 32* 32* 35*   CREATININE mg/dL 1.00 1.09 1.02 1.06 1.13   EGFR ml/min/1.73sq m 60 54 59 56 52   CALCIUM mg/dL 9.6 9.8 9.7 10.7* 10.7*         Results from last 7 days   Lab Units 09/18/24  0553 09/18/24  0223 09/18/24  0018 09/17/24  2318 09/17/24  1846 09/17/24  1552 09/17/24  1152 09/17/24  0600 09/17/24  0215 09/17/24  0100 09/17/24  0011 09/16/24  1822   POC GLUCOSE mg/dl 113 108 178* 58* 108 137 214* 170* 168* 198* 77 169*     Results from last 7 days   Lab Units 09/18/24  0552 09/17/24  0628 09/16/24  0621 09/15/24  0624 09/14/24  0542  09/13/24  1309 09/12/24  0514 09/11/24  1839   GLUCOSE RANDOM mg/dL 106 162* 245* 274* 224* 209* 108 45*           Results from last 7 days   Lab Units 09/15/24  0624   PROCALCITONIN ng/ml 0.11           Results from last 7 days   Lab Units 09/17/24  0900   HEP B S AG  Non-reactive   HEP C AB  Non-reactive           Results from last 7 days   Lab Units 09/16/24  1848   CLARITY UA  Clear   COLOR UA  Light Yellow   SPEC GRAV UA  1.019   PH UA  6.5   GLUCOSE UA mg/dl Negative   KETONES UA mg/dl Negative   BLOOD UA  Negative   PROTEIN UA mg/dl Trace*   NITRITE UA  Negative   BILIRUBIN UA  Negative   UROBILINOGEN UA (BE) mg/dl <2.0   LEUKOCYTES UA  Negative   WBC UA /hpf 2-4*   RBC UA /hpf 1-2   BACTERIA UA /hpf Occasional   EPITHELIAL CELLS WET PREP /hpf Occasional         Medications:   Scheduled Medications:  ARIPiprazole, 2 mg, Per G Tube, HS  vitamin C, 1,000 mg, Per G Tube, Daily  baclofen, 10 mg, Per G Tube, TID  calcium carbonate, 750 mg, Oral, BID  carbidopa-levodopa, 1 tablet, Oral, 4x Daily  cholecalciferol, 1,000 Units, Per G Tube, Daily  citalopram, 40 mg, Per G Tube, Daily  docosanol, , Topical, 5x Daily  enoxaparin, 40 mg, Subcutaneous, Daily  Ferrous Sulfate, 300 mg, Oral, BID AC  insulin regular, 1-5 Units, Subcutaneous, Q6H  insulin regular, 4 Units, Subcutaneous, Q6H ROSEMARY  levothyroxine, 25 mcg, Per G Tube, Daily  LORazepam, 0.5 mg, Per G Tube, HS  methenamine hippurate, 1 g, Oral, BID  midodrine, 5 mg, Oral, TID AC  omeprazole (PRILOSEC) suspension 2 mg/mL, 20 mg, Oral, Daily  oxybutynin, 5 mg, Per G Tube, TID  polyethylene glycol, 17 g, Oral, Daily  pravastatin, 40 mg, Per G Tube, Daily With Dinner  valproic acid, 125 mg, Oral, Q12H ROSEMARY      Continuous IV Infusions:     PRN Meds:  acetaminophen, 650 mg, Oral, Q6H PRN  ammonium lactate, , Topical, BID PRN  artificial tear, , Both Eyes, HS PRN  carbamide peroxide, 2 drop, Both Ears, BID PRN  dextromethorphan-guaiFENesin, 10 mL, Per G Tube, Q4H  PRN  fluticasone, 1 spray, Nasal, Daily PRN  hydrocortisone, , Topical, 4x Daily PRN  Ketotifen Fumarate, 1 drop, Both Eyes, Daily PRN  Loratadine, 10 mg, Oral, Daily PRN        Discharge Plan: TBD    Network Utilization Review Department  ATTENTION: Please call with any questions or concerns to [phone] and carefully listen to the prompts so that you are directed to the right person. All voicemails are confidential.   For Discharge needs, contact Care Management DC Support Team at [phone] opt. [phone]  Send all requests for admission clinical reviews, approved or denied determinations and any other requests to dedicated fax number below belonging to the campus where the patient is receiving treatment. List of dedicated fax numbers for the Facilities:  FACILITY NAME UR FAX NUMBER   ADMISSION DENIALS (Administrative/Medical Necessity) [phone]   DISCHARGE SUPPORT TEAM (NETWORK) [phone]   PARENT CHILD HEALTH (Maternity/NICU/Pediatrics) [phone]   [facility] [phone]   [facility] [phone]   [facility] [phone]   [facility] [phone]   [facility] [phone]   [facility] [phone]   [facility] [phone]   [facility] [phone]   [facility] [phone]   [facility] [phone]   General acute hospital [phone]   [facility] [phone]

## 2024-09-18 NOTE — PROGRESS NOTES
Progress Note - Hospitalist   Name: Terrie Alicea 64 y.o. female I MRN: [id number]  Unit/Bed#: S -01 I Date of Admission: 8/24/2024   Date of Service: 9/18/2024 I Hospital Day: 25    Assessment & Plan  Dysphagia  - s/p PEG tube on 8/26 during this admission  - Per group home, pt attends day program which cannot manage tube feeds so requesting her to receive it from 6pm to 6am. Settings adjusted to take this into consideration per nutrition. Nutrition's recommendations to change tube feed from Glucerna to Nepro d/t lower potassium content of the latter. Originally switched from Glucerna to Nepro d/t elevated potassium; however pt tolerated Glucerna better as evidenced by glucose readings.     Plan:  Continue Glucerna 1.2 tube feeds  Pt not to receive oral feeds due to risk of aspiration, evidenced by: episode previous to this hospitalization, during this hospitalization, and now current fever   See plan under type 2 diabetes  Diabetes mellitus type 2, noninsulin dependent (HCC)  Lab Results   Component Value Date    HGBA1C 6.2 (H) 08/13/2024     Recent Labs     09/18/24  0018 09/18/24  0223 09/18/24  0553 09/18/24  1146   POCGLU 178* 108 113 137     Blood Sugar Average: Last 72 hrs:  (P) [id number]    Pt having hyperglycemic episodes in the setting of tube feeds, possibly related to refeeding syndrome although pt has not had electrolyte abnormalities. Nutrition switched feed from Jevity 1.5 to Glucerna 1.2 for hyperglycemia, then to Nepro on 9/12 for hyperkalemia. Pt was doing well on 25u Lantus however became hypoglycemic; Lantus decreased to 18u on 9/6, then to 15u on 9/7. On 9/8 switched Lantus 15u to NPH 15u to prevent evening hypoglycemia in the setting of cyclic tube feeds; however pt still hyperglycemic w BG>200 in the AM.  9/11 NPH 15u evening and NPH 5u morning. 9/12 discontinued morning NPH d/t patient's mid-morning hypoglycemia.     Plan:  Endocrinology consulted, appreciate  recommendations  Regular insulin 4 units q6h  Regular insulin SSI AG 1  Hypoglycemia protocol  Continue Jevity tube feed 6p-6a  Fever  9/14 pt had new onset fevers, Tmax 102.1F. Symptomatic relief with Tylenol. Current workup is pending to rule-out source of fever. No leucocytosis or tachycardia today. Suspecting another aspiration pneumonia vs aspiration pneumonitis.    CXR, UA WNL   Procal 0.11.  PRN Tylenol for fever  Afebrile over last 48 hrs  Hypotension  Pt noted to be hypotensive intermittently throughout hospitalization. Per caregivers and record review, pt's BP tends to run on softer side. Pt has hx of PD which could contribute to autonomic dysfunction as well. Pt's BP have been appropriate since initiation of Midodrine 5 mg TID.     Continue midodrine 5 mg TID  Compression stockings  VS q4h  Fluid intake per Nutrition, see plan as under dysphagia  MAPs remain above 65.   Cerebral palsy (HCC)  Continue baclofen 10 mg TID  Incontinence  Continue oxybutynin 5 mg TID  UA WNL  Check postvoid residual  Parkinson's disease  History of mood disturbances, previously on neuroleptics. Tardive dyskinesia on exam.    Sinemet  qid. Can defer changes to outpatient neurology.   Mood disturbances  Continue home meds: Abilify 2 mg, Citalopram 40 mg.  Pt previously on trazodone 100 mg q HS which was discontinued on 8/30 while in the ICU d/t contributions to sedation.    Continue Depakene oral solution 125 mg q12h for mood, can defer changes per outpatient psychiatry as deemed necessary.  Pt has been stable from this standpoint throughout hospitalization, hasn't required restarting of trazodone or escalation in valproic acid thus far.  [facility] requested psychiatric evaluation/clearance for rehab placement d/t a chart history of bipolar and IDD. Per psychiatry, pt is stable on current regimen, no indication for inpatient psychiatric treatment, and pt is not an imminent risk of harm to herself or others.    Hypothyroidism  Home med: Levothyroxine 25 mcg    Plan:  Per endocrinology, continue home levothyroxine at 1700, 1 hour prior to starting tube feeds    VTE Pharmacologic Prophylaxis: VTE Score: 7 High Risk (Score >/= 5) - Pharmacological DVT Prophylaxis Ordered: enoxaparin (Lovenox). Sequential Compression Devices Ordered.    Mobility:   Basic Mobility Inpatient Raw Score: 6  JH-HLM Goal: 2: Bed activities/Dependent transfer  JH-HLM Achieved: 2: Bed activities/Dependent transfer  JH-HLM Goal achieved. Continue to encourage appropriate mobility.    Patient Centered Rounds: I performed bedside rounds with nursing staff today.   Discussions with Specialists or Other Care Team Provider: General Surgery, psychiatry, endocrinology, nutrition services    Education and Discussions with Family / Patient:  caregiver Lisa.     Current Length of Stay: 25 day(s)  Current Patient Status: Inpatient   Certification Statement: The patient will continue to require additional inpatient hospital stay due to type 2 diabetes mellitus with labile blood sugars  Discharge Plan: Anticipate discharge in 24-48 hrs to group home.    Code Status: Level 1 - Full Code    Subjective   Patient was seen and evaluated at bedside. She is non-interactive, non-verbal, and non-communicative. At baseline.    Objective     Vitals:   Temp (24hrs), Av.5 °F (36.9 °C), Min:97.7 °F (36.5 °C), Max:98.8 °F (37.1 °C)    Temp:  [97.7 °F (36.5 °C)-98.8 °F (37.1 °C)] 98.8 °F (37.1 °C)  HR:  [65-93] 72  Resp:  [16-18] 16  BP: ()/(56-68) 104/60  SpO2:  [87 %-96 %] 92 %  Body mass index is 18.71 kg/m².     Input and Output Summary (last 24 hours):     Intake/Output Summary (Last 24 hours) at 2024 1728  Last data filed at 2024 1001  Gross per 24 hour   Intake 4420 ml   Output 209 ml   Net 4211 ml       Physical Exam  Constitutional:       Appearance: Normal appearance.   HENT:      Head: Normocephalic and atraumatic.      Mouth/Throat:      Mouth:  Mucous membranes are dry.      Comments: Dystonic orofacial movements  Cardiovascular:      Rate and Rhythm: Normal rate and regular rhythm.      Heart sounds: No murmur heard.  Pulmonary:      Breath sounds: No wheezing, rhonchi or rales.   Chest:      Chest wall: No tenderness.   Abdominal:      General: Bowel sounds are normal. There is no distension.      Palpations: Abdomen is soft.      Tenderness: There is no abdominal tenderness. There is no guarding or rebound.   Musculoskeletal:      Cervical back: Normal range of motion.      Right lower leg: No edema.      Left lower leg: No edema.      Comments: Chronic contractures to all extremities   Skin:     Capillary Refill: Capillary refill takes 2 to 3 seconds.   Neurological:      Mental Status: She is alert.      Comments: Opens eyes, not interactive, nonverbal         Lines/Drains:  Lines/Drains/Airways       Active Status       Name Placement date Placement time Site Days    Gastrostomy/Enterostomy  16 Fr. LUQ 08/26/24  1643  LUQ  23                      Lab Results: I have reviewed the following results:    Results from last 7 days   Lab Units 09/17/24  0628   WBC Thousand/uL 6.65   HEMOGLOBIN g/dL 10.5*   HEMATOCRIT % 32.6*   PLATELETS Thousands/uL 212   SEGS PCT % 40*   LYMPHO PCT % 40   MONO PCT % 10   EOS PCT % 8*     Results from last 7 days   Lab Units 09/18/24  0552   SODIUM mmol/L 138   POTASSIUM mmol/L 4.7   CHLORIDE mmol/L 98   CO2 mmol/L 34*   BUN mg/dL 35*   CREATININE mg/dL 1.00   ANION GAP mmol/L 6   CALCIUM mg/dL 9.6   GLUCOSE RANDOM mg/dL 106         Results from last 7 days   Lab Units 09/18/24  1146 09/18/24  0553 09/18/24  0223 09/18/24  0018 09/17/24  2318 09/17/24  1846 09/17/24  1552 09/17/24  1152 09/17/24  0600 09/17/24  0215 09/17/24  0100 09/17/24  0011   POC GLUCOSE mg/dl 137 113 108 178* 58* 108 137 214* 170* 168* 198* 77         Results from last 7 days   Lab Units 09/15/24  0624   PROCALCITONIN ng/ml 0.11       Recent Cultures  (last 7 days):         Imaging Review: Reviewed radiology reports from this admission including: chest xray, CT chest, and CT abdomen/pelvis.  Other Studies: EKG was reviewed.     Last 24 Hours Medication List:     Current Facility-Administered Medications:     acetaminophen (TYLENOL) tablet 650 mg, Q6H PRN    ammonium lactate (LAC-HYDRIN) 12 % cream, BID PRN    ARIPiprazole (ABILIFY) tablet 2 mg, HS    artificial tear ophthalmic ointment, HS PRN    ascorbic acid (VITAMIN C) tablet 1,000 mg, Daily    baclofen tablet 10 mg, TID    calcium carbonate (TUMS) chewable tablet 750 mg, BID    carbamide peroxide (DEBROX) 6.5 % otic solution 2 drop, BID PRN    carbidopa-levodopa (SINEMET)  mg per tablet 1 tablet, 4x Daily    Cholecalciferol (VITAMIN D3) tablet 1,000 Units, Daily    citalopram (CeleXA) tablet 40 mg, Daily    dextromethorphan-guaiFENesin (ROBITUSSIN DM) oral syrup 10 mL, Q4H PRN    docosanol (ABREVA) 10 % cream, 5x Daily    enoxaparin (LOVENOX) subcutaneous injection 40 mg, Daily    Ferrous Sulfate oral syrup 300 mg, BID AC    fluticasone (FLONASE) 50 mcg/act nasal spray 1 spray, Daily PRN    hydrocortisone 1 % cream, 4x Daily PRN    insulin regular (HumuLIN R,NovoLIN R) injection 1-5 Units, Q6H    insulin regular (HumuLIN R,NovoLIN R) injection 4 Units, Q6H ROSEMARY    Ketotifen Fumarate (ZADITOR) 0.035 % ophthalmic solution 1 drop, Daily PRN    levothyroxine tablet 25 mcg, Daily    Loratadine (CLARITIN) oral soln 10 mg, Daily PRN    LORazepam (ATIVAN) tablet 0.5 mg, HS    methenamine hippurate (HIPREX) tablet 1 g, BID    midodrine (PROAMATINE) tablet 5 mg, TID AC    omeprazole (PRILOSEC) suspension 2 mg/mL, Daily    oxybutynin (DITROPAN) tablet 5 mg, TID    polyethylene glycol (MIRALAX) packet 17 g, Daily    pravastatin (PRAVACHOL) tablet 40 mg, Daily With Dinner    valproic acid (DEPAKENE) oral soln 125 mg, Q12H ROSEMARY    Administrative Statements   Today, Patient Was Seen By: Yolanda Kay DO      **Please  Note: This note may have been constructed using a voice recognition system.**

## 2024-09-18 NOTE — CASE MANAGEMENT
Case Management Discharge Planning Note    Patient name Terrie Alicea  Location S /S -01 MRN [id number]  : 1960 Date 2024       Current Admission Date: 2024  Current Admission Diagnosis:Dysphagia   Patient Active Problem List    Diagnosis Date Noted Date Diagnosed    Fever 09/15/2024     Mood disturbances 2024     Severe protein-calorie malnutrition (HCC) 2024     Functional quadriplegia (HCC) 2024     Protein-calorie malnutrition, unspecified severity (HCC) 2024     Hospital discharge follow-up 2024     SIRS (systemic inflammatory response syndrome) ([facility]) 2024     Developmental delay      Preop examination 2023     Cataract 2023     Internal hemorrhoids 2023     Contracture of right hand 2023     Need for shingles vaccine 2023     Viral illness 2022     Dystonic cerebral palsy (HCC) 2022     Cervical dystonia 2022     Parkinson's disease 2022     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HCC) 10/18/2021     Polyneuropathy associated with underlying disease ([facility]) 10/18/2021     Hypercalcemia 2021     Herpes zoster without complication 2021     Abnormal finding on lung imaging 2021     Anemia 2020     Eosinophilic leukocytosis 2020     Underweight 2020     Dysphagia 2020     History of vitamin D deficiency 2020     History of fall 10/28/2019     Breast asymmetry 2019     Hypernatremia 2018     Severe sepsis (HCC) 2018     Gait disturbance 2018     Osteoarthritis of both hips 2018     Severe Intellectual disability 2018     Diabetes mellitus type 2, noninsulin dependent (HCC) 2018     Abnormal albumin 2017     Peripheral neuropathy 2017     Seasonal allergies 2017     Physical deconditioning 2017     Hyperlipidemia 2017     Gastroesophageal reflux disease 2017     Cerebral palsy ([facility])  03/27/2017     Spasticity 03/27/2017     Ambulatory dysfunction 03/27/2017     Bipolar disorder (HCC) 03/27/2017     Hypotension 04/11/2016     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/2016     Weight loss 01/21/2016     Osteoporosis 01/19/2016     Resting tremor 01/19/2016     Acute metabolic encephalopathy 12/11/2015     Gait abnormality 12/11/2015     Platelets decreased (HCC) 07/31/2015     Other chronic pain 07/30/2015     Postmenopausal vaginal bleeding 07/23/2015     Anxiety 07/15/2015     Menopause 09/25/2014     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/28/2014     Atopic dermatitis 06/02/2014     Dry eye 06/02/2014     Constipation 04/12/2013     Iron deficiency anemia 03/27/2013     Incontinence 03/27/2013     Other specified hypothyroidism 01/10/2013       LOS (days): 25  Geometric Mean LOS (GMLOS) (days):   Days to GMLOS:     OBJECTIVE:  Risk of Unplanned Readmission Score: 42.53         Current admission status: Inpatient   Preferred Pharmacy:   [facility] [place] - [address] [facility]  [address]  [address]  [place] [address]  Phone: [phone] Fax: [phone]    [facility]  [address]  [place] [address]  Phone: [phone] Fax: [phone]    [facility] [place] - [address]  [address] [facility] [address]  Phone: [phone] Fax: [phone]    [facility] - [place] - [address]  [address]  [facility] [address]  Phone: [phone] Fax: [phone]    Primary Care Provider: Gunnar Sylvester DO    Primary Insurance: [employer]ICARE  REP  Secondary Insurance[place]    DISCHARGE DETAILS:    TC to Lisa Penny RN at [facility] group [place].  Per lisa, the majority of group home staff will be trained on how to administer insulin tomorrow, Thursday 9/19. The remaining staff will either be trained on Saturday, 9/21 or  Monday, 9/23.  Per Lisa, the earliest they would be able to safely accept the Pt home would be on Monday afternoon.      Per SLIM provider, Pt is expected to be medically ready for discharge tomorrow, pending controlled blood sugars.  Lisa is aware that if Pt is medically ready, we will need to D/C her to a SNF. Lisa states that she needs additional tube feed supplies and new formular ordered as it has changed since the Pt was admitted.  Lisa states that she will make a list of supplies she needs for the Pts Kangaroo Omni pump.

## 2024-09-18 NOTE — ASSESSMENT & PLAN NOTE
Lab Results   Component Value Date    HGBA1C 6.2 (H) 08/13/2024     Recent Labs     09/18/24  0018 09/18/24  0223 09/18/24  0553 09/18/24  1146   POCGLU 178* 108 113 137     Blood Sugar Average: Last 72 hrs:  (P) [id number]    Pt having hyperglycemic episodes in the setting of tube feeds, possibly related to refeeding syndrome although pt has not had electrolyte abnormalities. Nutrition switched feed from Jevity 1.5 to Glucerna 1.2 for hyperglycemia, then to Nepro on 9/12 for hyperkalemia. Pt was doing well on 25u Lantus however became hypoglycemic; Lantus decreased to 18u on 9/6, then to 15u on 9/7. On 9/8 switched Lantus 15u to NPH 15u to prevent evening hypoglycemia in the setting of cyclic tube feeds; however pt still hyperglycemic w BG>200 in the AM.  9/11 NPH 15u evening and NPH 5u morning. 9/12 discontinued morning NPH d/t patient's mid-morning hypoglycemia.     Plan:  Endocrinology consulted, appreciate recommendations  Regular insulin 4 units q6h  Regular insulin SSI AG 1  Hypoglycemia protocol  Continue Jevity tube feed 6p-6a

## 2024-09-18 NOTE — ASSESSMENT & PLAN NOTE
A1c (8/2024): 6.2%  - home regimen: Metformin 1000 BID, Glimepiride 1 mg daily  - Tube feeds via PEG: cyclic (q12hrs); Glucerna 1.2, 80 mL/hr  - currently on Regular Insulin 4 units every 6 hours with correctional scale (regular insulin, A1)  - will continue to follow and adjust as needed

## 2024-09-18 NOTE — ASSESSMENT & PLAN NOTE
- s/p PEG tube on 8/26 during this admission  - Per group home, pt attends day program which cannot manage tube feeds so requesting her to receive it from 6pm to 6am. Settings adjusted to take this into consideration per nutrition. Nutrition's recommendations to change tube feed from Glucerna to Nepro d/t lower potassium content of the latter. Originally switched from Glucerna to Nepro d/t elevated potassium; however pt tolerated Glucerna better as evidenced by glucose readings.     Plan:  Continue Glucerna 1.2 tube feeds  Pt not to receive oral feeds due to risk of aspiration, evidenced by: episode previous to this hospitalization, during this hospitalization, and now current fever   See plan under type 2 diabetes

## 2024-09-18 NOTE — NUTRITION
If pt is not D/Cing to group home recommend switching to continuous feeds for better blood sugar control. Glucerna 1.2 @ 40 ml/hr X 24 hrs. Water flushes of 65 ml q 4 hrs. Total volume 960 ml, 1152 kcals, 58 g protein, 109 g CHO, and 1163 ml total water from formula + flushes.     If plan to return to group home, continue current order. Glucerna 1.2 @ 80 ml/hr X 12 hrs. Water flushes of 125 ml q 4 hrs.

## 2024-09-18 NOTE — CASE MANAGEMENT
Case Management Discharge Planning Note    Patient name Terrie Alicea  Location S /S -01 MRN [id number]  : 1960 Date 2024       Current Admission Date: 2024  Current Admission Diagnosis:Dysphagia   Patient Active Problem List    Diagnosis Date Noted Date Diagnosed    Fever 09/15/2024     Mood disturbances 2024     Severe protein-calorie malnutrition (HCC) 2024     Functional quadriplegia (HCC) 2024     Protein-calorie malnutrition, unspecified severity (HCC) 2024     Hospital discharge follow-up 2024     SIRS (systemic inflammatory response syndrome) ([facility]) 2024     Developmental delay      Preop examination 2023     Cataract 2023     Internal hemorrhoids 2023     Contracture of right hand 2023     Need for shingles vaccine 2023     Viral illness 2022     Dystonic cerebral palsy (HCC) 2022     Cervical dystonia 2022     Parkinson's disease 2022     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HCC) 10/18/2021     Polyneuropathy associated with underlying disease ([facility]) 10/18/2021     Hypercalcemia 2021     Herpes zoster without complication 2021     Abnormal finding on lung imaging 2021     Anemia 2020     Eosinophilic leukocytosis 2020     Underweight 2020     Dysphagia 2020     History of vitamin D deficiency 2020     History of fall 10/28/2019     Breast asymmetry 2019     Hypernatremia 2018     Severe sepsis (HCC) 2018     Gait disturbance 2018     Osteoarthritis of both hips 2018     Severe Intellectual disability 2018     Diabetes mellitus type 2, noninsulin dependent (HCC) 2018     Abnormal albumin 2017     Peripheral neuropathy 2017     Seasonal allergies 2017     Physical deconditioning 2017     Hyperlipidemia 2017     Gastroesophageal reflux disease 2017     Cerebral palsy ([facility])  03/27/2017     Spasticity 03/27/2017     Ambulatory dysfunction 03/27/2017     Bipolar disorder (HCC) 03/27/2017     Hypotension 04/11/2016     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/2016     Weight loss 01/21/2016     Osteoporosis 01/19/2016     Resting tremor 01/19/2016     Acute metabolic encephalopathy 12/11/2015     Gait abnormality 12/11/2015     Platelets decreased (HCC) 07/31/2015     Other chronic pain 07/30/2015     Postmenopausal vaginal bleeding 07/23/2015     Anxiety 07/15/2015     Menopause 09/25/2014     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/28/2014     Atopic dermatitis 06/02/2014     Dry eye 06/02/2014     Constipation 04/12/2013     Iron deficiency anemia 03/27/2013     Incontinence 03/27/2013     Other specified hypothyroidism 01/10/2013       LOS (days): 25  Geometric Mean LOS (GMLOS) (days):   Days to GMLOS:     OBJECTIVE:  Risk of Unplanned Readmission Score: 42.15         Current admission status: Inpatient   Preferred Pharmacy:   [facility] [place] - [address]  [address]  [address]  [place] [address]  Phone: [phone] Fax: [phone]    [facility]  [address]  [place] [address]  Phone: [phone] Fax: [phone]    [facility] [place], [place] - [address]  [address] [facility] [address]  Phone: [phone] Fax: [phone]    [facility] [place] - [address]  [address]  [place] [address]  Phone: [phone] Fax: [phone]    Primary Care Provider: Gunnar Sylvester DO    Primary Insurance: [employer] MEDICARE  REP  Secondary Insurance[place] MED[place]    DISCHARGE DETAILS:                                                                                                               Facility Insurance Auth Number: [id number]

## 2024-09-19 ENCOUNTER — DOCUMENTATION (OUTPATIENT)
Dept: OTHER | Facility: HOSPITAL | Age: 64
End: 2024-09-19

## 2024-09-19 ENCOUNTER — TELEPHONE (OUTPATIENT)
Dept: OTHER | Facility: OTHER | Age: 64
End: 2024-09-19

## 2024-09-19 VITALS
OXYGEN SATURATION: 95 % | HEART RATE: 87 BPM | RESPIRATION RATE: 20 BRPM | DIASTOLIC BLOOD PRESSURE: 73 MMHG | SYSTOLIC BLOOD PRESSURE: 132 MMHG | TEMPERATURE: 100.5 F | WEIGHT: 89.95 LBS | BODY MASS INDEX: 18.8 KG/M2

## 2024-09-19 DIAGNOSIS — F99 PSYCHIATRIC DIAGNOSIS: Primary | ICD-10-CM

## 2024-09-19 PROBLEM — A41.9 SEVERE SEPSIS (HCC): Status: RESOLVED | Noted: 2018-12-17 | Resolved: 2024-09-19

## 2024-09-19 PROBLEM — R65.20 SEVERE SEPSIS (HCC): Status: RESOLVED | Noted: 2018-12-17 | Resolved: 2024-09-19

## 2024-09-19 LAB
ANION GAP SERPL CALCULATED.3IONS-SCNC: 8 MMOL/L (ref 4–13)
BASOPHILS # BLD AUTO: 0.06 THOUSANDS/ΜL (ref 0–0.1)
BASOPHILS NFR BLD AUTO: 1 % (ref 0–1)
BUN SERPL-MCNC: 34 MG/DL (ref 5–25)
CALCIUM SERPL-MCNC: 9.7 MG/DL (ref 8.4–10.2)
CHLORIDE SERPL-SCNC: 98 MMOL/L (ref 96–108)
CO2 SERPL-SCNC: 32 MMOL/L (ref 21–32)
CREAT SERPL-MCNC: 1.11 MG/DL (ref 0.6–1.3)
DME PARACHUTE DELIVERY DATE REQUESTED: NORMAL
DME PARACHUTE DELIVERY NOTE: NORMAL
DME PARACHUTE ORDER STATUS: NORMAL
DME PARACHUTE SUPPLIER NAME: NORMAL
DME PARACHUTE SUPPLIER PHONE: NORMAL
EOSINOPHIL # BLD AUTO: 0.13 THOUSAND/ΜL (ref 0–0.61)
EOSINOPHIL NFR BLD AUTO: 2 % (ref 0–6)
ERYTHROCYTE [DISTWIDTH] IN BLOOD BY AUTOMATED COUNT: 13.7 % (ref 11.6–15.1)
GFR SERPL CREATININE-BSD FRML MDRD: 53 ML/MIN/1.73SQ M
GLUCOSE SERPL-MCNC: 100 MG/DL (ref 65–140)
GLUCOSE SERPL-MCNC: 121 MG/DL (ref 65–140)
GLUCOSE SERPL-MCNC: 169 MG/DL (ref 65–140)
GLUCOSE SERPL-MCNC: 176 MG/DL (ref 65–140)
HCT VFR BLD AUTO: 30.8 % (ref 34.8–46.1)
HGB BLD-MCNC: 10 G/DL (ref 11.5–15.4)
IMM GRANULOCYTES # BLD AUTO: 0.03 THOUSAND/UL (ref 0–0.2)
IMM GRANULOCYTES NFR BLD AUTO: 0 % (ref 0–2)
LYMPHOCYTES # BLD AUTO: 2.61 THOUSANDS/ΜL (ref 0.6–4.47)
LYMPHOCYTES NFR BLD AUTO: 36 % (ref 14–44)
MCH RBC QN AUTO: 33 PG (ref 26.8–34.3)
MCHC RBC AUTO-ENTMCNC: 32.5 G/DL (ref 31.4–37.4)
MCV RBC AUTO: 102 FL (ref 82–98)
MONOCYTES # BLD AUTO: 0.61 THOUSAND/ΜL (ref 0.17–1.22)
MONOCYTES NFR BLD AUTO: 8 % (ref 4–12)
NEUTROPHILS # BLD AUTO: 3.79 THOUSANDS/ΜL (ref 1.85–7.62)
NEUTS SEG NFR BLD AUTO: 53 % (ref 43–75)
NRBC BLD AUTO-RTO: 0 /100 WBCS
PLATELET # BLD AUTO: 202 THOUSANDS/UL (ref 149–390)
PMV BLD AUTO: 11.8 FL (ref 8.9–12.7)
POTASSIUM SERPL-SCNC: 4.8 MMOL/L (ref 3.5–5.3)
RBC # BLD AUTO: 3.03 MILLION/UL (ref 3.81–5.12)
SODIUM SERPL-SCNC: 138 MMOL/L (ref 135–147)
WBC # BLD AUTO: 7.23 THOUSAND/UL (ref 4.31–10.16)

## 2024-09-19 PROCEDURE — 85025 COMPLETE CBC W/AUTO DIFF WBC: CPT

## 2024-09-19 PROCEDURE — 99239 HOSP IP/OBS DSCHRG MGMT >30: CPT | Performed by: HOSPITALIST

## 2024-09-19 PROCEDURE — 99232 SBSQ HOSP IP/OBS MODERATE 35: CPT | Performed by: INTERNAL MEDICINE

## 2024-09-19 PROCEDURE — 80048 BASIC METABOLIC PNL TOTAL CA: CPT

## 2024-09-19 PROCEDURE — 82948 REAGENT STRIP/BLOOD GLUCOSE: CPT

## 2024-09-19 RX ORDER — LORAZEPAM 0.5 MG/1
0.5 TABLET ORAL DAILY
Qty: 4 TABLET | Refills: 0 | Status: SHIPPED | OUTPATIENT
Start: 2024-09-19 | End: 2024-09-23 | Stop reason: SDUPTHER

## 2024-09-19 RX ORDER — MIDODRINE HYDROCHLORIDE 5 MG/1
5 TABLET ORAL
Qty: 90 TABLET | Refills: 0 | Status: ON HOLD
Start: 2024-09-19 | End: 2024-10-19

## 2024-09-19 RX ORDER — FERROUS SULFATE 300 MG/5ML
LIQUID (ML) ORAL
Qty: 30 ML | Refills: 0 | Status: ON HOLD
Start: 2024-09-19

## 2024-09-19 RX ORDER — FAMOTIDINE 40 MG/5ML
20 POWDER, FOR SUSPENSION ORAL DAILY
Qty: 75 ML | Refills: 0
Start: 2024-09-19 | End: 2024-09-23

## 2024-09-19 RX ORDER — LEVOTHYROXINE SODIUM 25 UG/1
25 TABLET ORAL EVERY MORNING
Qty: 28 TABLET | Refills: 0 | Status: ON HOLD
Start: 2024-09-19

## 2024-09-19 RX ORDER — GLUCOSAMINE HCL/CHONDROITIN SU 500-400 MG
CAPSULE ORAL
Qty: 100 EACH | Refills: 0 | Status: ON HOLD | OUTPATIENT
Start: 2024-09-19

## 2024-09-19 RX ORDER — VALPROIC ACID 250 MG/5ML
125 SOLUTION ORAL EVERY 12 HOURS SCHEDULED
Qty: 150 ML | Refills: 0 | Status: ON HOLD
Start: 2024-09-19 | End: 2024-10-19

## 2024-09-19 RX ORDER — SIMVASTATIN 20 MG
20 TABLET ORAL
Qty: 28 TABLET | Refills: 0 | Status: ON HOLD
Start: 2024-09-19

## 2024-09-19 RX ORDER — LANCETS 33 GAUGE
EACH MISCELLANEOUS
Qty: 100 EACH | Refills: 0 | Status: ON HOLD | OUTPATIENT
Start: 2024-09-19

## 2024-09-19 RX ORDER — PEN NEEDLE, DIABETIC 32GX 5/32"
NEEDLE, DISPOSABLE MISCELLANEOUS
Qty: 100 EACH | Refills: 0 | Status: SHIPPED | OUTPATIENT
Start: 2024-09-19 | End: 2024-09-23 | Stop reason: SDUPTHER

## 2024-09-19 RX ORDER — BLOOD SUGAR DIAGNOSTIC
STRIP MISCELLANEOUS
Qty: 100 EACH | Refills: 0 | Status: SHIPPED | OUTPATIENT
Start: 2024-09-19 | End: 2024-09-23 | Stop reason: SDUPTHER

## 2024-09-19 RX ORDER — HUMAN INSULIN 100 [IU]/ML
4 INJECTION, SOLUTION SUBCUTANEOUS EVERY 6 HOURS
Qty: 15 ML | Refills: 0 | Status: ON HOLD | OUTPATIENT
Start: 2024-09-19 | End: 2024-10-19

## 2024-09-19 RX ORDER — BLOOD-GLUCOSE METER
KIT MISCELLANEOUS
Qty: 1 KIT | Refills: 0 | Status: ON HOLD | OUTPATIENT
Start: 2024-09-19

## 2024-09-19 RX ADMIN — Medication 20 MG: at 11:05

## 2024-09-19 RX ADMIN — INSULIN HUMAN 4 UNITS: 100 INJECTION, SOLUTION PARENTERAL at 12:01

## 2024-09-19 RX ADMIN — INSULIN HUMAN 1 UNITS: 100 INJECTION, SOLUTION PARENTERAL at 12:02

## 2024-09-19 RX ADMIN — POLYETHYLENE GLYCOL 3350 17 G: 17 POWDER, FOR SOLUTION ORAL at 08:35

## 2024-09-19 RX ADMIN — OXYBUTYNIN CHLORIDE 5 MG: 5 TABLET ORAL at 08:35

## 2024-09-19 RX ADMIN — VALPROIC ACID 125 MG: 250 SOLUTION ORAL at 08:35

## 2024-09-19 RX ADMIN — DOCOSANOL: 100 CREAM TOPICAL at 11:01

## 2024-09-19 RX ADMIN — INSULIN HUMAN 4 UNITS: 100 INJECTION, SOLUTION PARENTERAL at 06:07

## 2024-09-19 RX ADMIN — BACLOFEN 10 MG: 10 TABLET ORAL at 08:35

## 2024-09-19 RX ADMIN — DOCOSANOL: 100 CREAM TOPICAL at 08:38

## 2024-09-19 RX ADMIN — CARBIDOPA AND LEVODOPA 1 TABLET: 25; 100 TABLET ORAL at 11:00

## 2024-09-19 RX ADMIN — OXYCODONE HYDROCHLORIDE AND ACETAMINOPHEN 1000 MG: 500 TABLET ORAL at 08:35

## 2024-09-19 RX ADMIN — MIDODRINE HYDROCHLORIDE 5 MG: 5 TABLET ORAL at 08:38

## 2024-09-19 RX ADMIN — CARBIDOPA AND LEVODOPA 1 TABLET: 25; 100 TABLET ORAL at 08:35

## 2024-09-19 RX ADMIN — Medication 1000 UNITS: at 08:35

## 2024-09-19 RX ADMIN — CALCIUM CARBONATE (ANTACID) CHEW TAB 500 MG 750 MG: 500 CHEW TAB at 08:35

## 2024-09-19 RX ADMIN — ENOXAPARIN SODIUM 40 MG: 40 INJECTION SUBCUTANEOUS at 08:35

## 2024-09-19 RX ADMIN — METHENAMINE HIPPURATE 1 G: 1 TABLET ORAL at 08:38

## 2024-09-19 RX ADMIN — INSULIN HUMAN 1 UNITS: 100 INJECTION, SOLUTION PARENTERAL at 06:11

## 2024-09-19 RX ADMIN — FERROUS SULFATE 300 MG: 300 SOLUTION ORAL at 08:38

## 2024-09-19 RX ADMIN — CITALOPRAM HYDROBROMIDE 40 MG: 20 TABLET ORAL at 08:35

## 2024-09-19 RX ADMIN — MIDODRINE HYDROCHLORIDE 5 MG: 5 TABLET ORAL at 11:00

## 2024-09-19 RX ADMIN — DOCOSANOL: 100 CREAM TOPICAL at 13:27

## 2024-09-19 RX ADMIN — ACETAMINOPHEN 650 MG: 325 TABLET ORAL at 08:51

## 2024-09-19 NOTE — ASSESSMENT & PLAN NOTE
Patient had intermittent fevers Tmax 102.1F. Symptomatic relief with IV Tylenol. Current workup is pending to rule-out source of fever. No leukocytosis or tachycardia today. Suspecting microaspiration pneumonia secondary to reflux of tube feeds

## 2024-09-19 NOTE — CASE MANAGEMENT
Case Management Discharge Planning Note    Patient name Terrie Alicea  Location S /S -01 MRN [id number]  : 1960 Date 2024       Current Admission Date: 2024  Current Admission Diagnosis:Dysphagia   Patient Active Problem List    Diagnosis Date Noted Date Diagnosed    Fever 09/15/2024     Mood disturbances 2024     Severe protein-calorie malnutrition (HCC) 2024     Functional quadriplegia (HCC) 2024     Protein-calorie malnutrition, unspecified severity (HCC) 2024     Hospital discharge follow-up 2024     SIRS (systemic inflammatory response syndrome) ([facility]) 2024     Developmental delay      Preop examination 2023     Cataract 2023     Internal hemorrhoids 2023     Contracture of right hand 2023     Need for shingles vaccine 2023     Viral illness 2022     Dystonic cerebral palsy (HCC) 2022     Cervical dystonia 2022     Parkinson's disease 2022     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HCC) 10/18/2021     Polyneuropathy associated with underlying disease ([facility]) 10/18/2021     Hypercalcemia 2021     Herpes zoster without complication 2021     Abnormal finding on lung imaging 2021     Anemia 2020     Eosinophilic leukocytosis 2020     Underweight 2020     Dysphagia 2020     History of vitamin D deficiency 2020     History of fall 10/28/2019     Breast asymmetry 2019     Hypernatremia 2018     Severe sepsis (HCC) 2018     Gait disturbance 2018     Osteoarthritis of both hips 2018     Severe Intellectual disability 2018     Diabetes mellitus type 2, noninsulin dependent (HCC) 2018     Abnormal albumin 2017     Peripheral neuropathy 2017     Seasonal allergies 2017     Physical deconditioning 2017     Hyperlipidemia 2017     Gastroesophageal reflux disease 2017     Cerebral palsy (HCC)  03/27/2017     Spasticity 03/27/2017     Ambulatory dysfunction 03/27/2017     Bipolar disorder (HCC) 03/27/2017     Hypotension 04/11/2016     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/2016     Weight loss 01/21/2016     Osteoporosis 01/19/2016     Resting tremor 01/19/2016     Acute metabolic encephalopathy 12/11/2015     Gait abnormality 12/11/2015     Platelets decreased (HCC) 07/31/2015     Other chronic pain 07/30/2015     Postmenopausal vaginal bleeding 07/23/2015     Anxiety 07/15/2015     Menopause 09/25/2014     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/28/2014     Atopic dermatitis 06/02/2014     Dry eye 06/02/2014     Constipation 04/12/2013     Iron deficiency anemia 03/27/2013     Incontinence 03/27/2013     Hypothyroidism 01/10/2013       LOS (days): 26  Geometric Mean LOS (GMLOS) (days):   Days to GMLOS:     OBJECTIVE:  Risk of Unplanned Readmission Score: 42.65         Current admission status: Inpatient   Preferred Pharmacy:   [facility]  [place] - [address] [facility]  [address] [facility] [address]  [address]  [facility] [address]  Phone: [phone] Fax: [phone]    [facility]  [address]  [place] [address]  Phone: [phone] Fax: [phone]    [facility] [place] - [address]  [address] [facility] [address]  Phone: [phone] Fax: [phone]    [facility]) - [place], [place] - [address]  [address]  [facility] [address]  Phone: [phone] Fax: [phone]    Primary Care Provider: Gunnar Sylvester DO    Primary Insurance: [employer] MEDICARE  REP  [place] Insurance[place]    DISCHARGE DETAILS:      Accepting Facility Name, City & State : [facility]  Receiving Facility/Agency Phone Number: [phone]  Facility/Agency Fax Number: [phone]         TC to Pts Caregiver, Lisa to inform her that the Pt is medically ready for  discharge today. Pt will be d/c'd to [facility] for placement while Pts group home staff are trained on how to administer insulin. SNF address and phone number provided to Lisa.  All medical questions answered by Dr. Kay.     VM left for Pts POA-  Ezra Morgan informing him that the Pt will be d/c'd to [facility] today.     RN and SLIM provider updated.

## 2024-09-19 NOTE — ASSESSMENT & PLAN NOTE
A1c (8/2024): 6.2%  - home regimen: Metformin 1000 BID, Glimepiride 1 mg daily  - Tube feeds via PEG: cyclic (q12hrs); Glucerna 1.2, 80 mL/hr  - currently on Regular Insulin 4 units every 6 hours with correctional scale (regular insulin, A1)  Blood glucose in the past 24 hrs ranging from 100 to 225.  Highest values occur in the evenings 9 pm to midnight and early mornings around 6 am.   Correctional Insulin requirement over the past 6 hrs is 10 units.     PLAN  Continue regular insulin 4 units every 6 hrs  Correctional insulin algorithm 1 regular insulin every 6 hrs.

## 2024-09-19 NOTE — CASE MANAGEMENT
Case Management Discharge Planning Note    Patient name Terrie Alicea  Location S /S -01 MRN [id number]  : 1960 Date 2024       Current Admission Date: 2024  Current Admission Diagnosis:Dysphagia   Patient Active Problem List    Diagnosis Date Noted Date Diagnosed    Fever 09/15/2024     Mood disturbances 2024     Severe protein-calorie malnutrition (HCC) 2024     Functional quadriplegia (HCC) 2024     Protein-calorie malnutrition, unspecified severity (HCC) 2024     Hospital discharge follow-up 2024     SIRS (systemic inflammatory response syndrome) ([facility]) 2024     Developmental delay      Preop examination 2023     Cataract 2023     Internal hemorrhoids 2023     Contracture of right hand 2023     Need for shingles vaccine 2023     Viral illness 2022     Dystonic cerebral palsy (HCC) 2022     Cervical dystonia 2022     Parkinson's disease 2022     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HCC) 10/18/2021     Polyneuropathy associated with underlying disease ([facility]) 10/18/2021     Hypercalcemia 2021     Herpes zoster without complication 2021     Abnormal finding on lung imaging 2021     Anemia 2020     Eosinophilic leukocytosis 2020     Underweight 2020     Dysphagia 2020     History of vitamin D deficiency 2020     History of fall 10/28/2019     Breast asymmetry 2019     Hypernatremia 2018     Severe sepsis (HCC) 2018     Gait disturbance 2018     Osteoarthritis of both hips 2018     Severe Intellectual disability 2018     Diabetes mellitus type 2, noninsulin dependent (HCC) 2018     Abnormal albumin 2017     Peripheral neuropathy 2017     Seasonal allergies 2017     Physical deconditioning 2017     Hyperlipidemia 2017     Gastroesophageal reflux disease 2017     Cerebral palsy (HCC)  03/27/2017     Spasticity 03/27/2017     Ambulatory dysfunction 03/27/2017     Bipolar disorder (HCC) 03/27/2017     Hypotension 04/11/2016     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/2016     Weight loss 01/21/2016     Osteoporosis 01/19/2016     Resting tremor 01/19/2016     Acute metabolic encephalopathy 12/11/2015     Gait abnormality 12/11/2015     Platelets decreased (HCC) 07/31/2015     Other chronic pain 07/30/2015     Postmenopausal vaginal bleeding 07/23/2015     Anxiety 07/15/2015     Menopause 09/25/2014     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/28/2014     Atopic dermatitis 06/02/2014     Dry eye 06/02/2014     Constipation 04/12/2013     Iron deficiency anemia 03/27/2013     Incontinence 03/27/2013     Hypothyroidism 01/10/2013       LOS (days): 26  Geometric Mean LOS (GMLOS) (days):   Days to GMLOS:     OBJECTIVE:  Risk of Unplanned Readmission Score: 42.58         Current admission status: Inpatient   Preferred Pharmacy:   [facility]  [place] - [address] [facility]  [address]  [address]  [facility] [address]  Phone: [phone] Fax: [phone]    [facility]  [address]  [facility] [address]  Phone: [phone] Fax: [phone]    [facility] [place] - [address]  [address] [facility] [address]  Phone: [phone] Fax: [phone]    [facility] [place] - [address]  [address]  [facility] [address]  Phone: [phone] Fax: [phone]    Primary Care Provider: Gunnar Sylvester DO    Primary Insurance: [employer] MEDICARE  REP  [place] Insurance[place]    DISCHARGE DETAILS:    Ryan Bentley already has a kangaroo pump at home from a previous order.  Pt resides in a group home and her primary care giver is requesting the following supplies: Feed flush bags, and irrigation tray, Y En-fit connector and a cleaning brush.  I did not  see these supplies listed to order in parachute.     KOBY met with Lisa RN/ Primary caregiver for Pt at her group home. Per Lisa, the Pt already has a Kangaroo pump at home as it was ordered/delivered earlier during this admission.  Lisa is requesting for CM to order a En-fit connector, feed flush bags, an irrigation tray, and a cleaning bush.  KOBY requested these items in parachute.  KOBY ordered Tube-feed formula (based off of RD's note from 9/19) as her TF formula has changed since it was first ordered.      Lisa states that the earliest the Pt can safely return to her group home is Monday afternoon.  KOBY spoke with LINDA Resident who states that the Pt is medically ready for discharge as of today. SLIM resident will update/address Lisa's medical/follow-up questions.

## 2024-09-19 NOTE — PROGRESS NOTES
Progress Note - Endocrinology   Name: Terrie Alicea 64 y.o. female I MRN: [id number]  Unit/Bed#: S -01 I Date of Admission: 8/24/2024   Date of Service: 9/19/2024 I Hospital Day: 26    Assessment & Plan  Diabetes mellitus type 2, noninsulin dependent (HCC)  A1c (8/2024): 6.2%  - home regimen: Metformin 1000 BID, Glimepiride 1 mg daily  - Tube feeds via PEG: cyclic (q12hrs); Glucerna 1.2, 80 mL/hr  - currently on Regular Insulin 4 units every 6 hours with correctional scale (regular insulin, A1)  - Blood glucose in the past 24 hrs ranging from 100 to 225  With highest values around evening 11 pm to midnight and early morning 6 am  -Per nurse, No residuals drawn with before each feed   -No hyper/hypoglycemic events  -has required 15 units of correctional insulin over the past 12hrs  PLAN  Continue Regular insulin 4 units every 6 hrs  Continue correctional insulin A1 with regular insulin    Hypothyroidism  TSH (8/2024): 1.27  - continue home Levothyroxine 25 mcg (one hour prior to first meal of the day)    History of Present Illness   24 Hour Events : none  Subjective : patient was sleeping at time of my assessment and none vocal at baseline. Spoke with her nurse, she has remained stable.     Objective      Temp:  [99.6 °F (37.6 °C)-100.5 °F (38.1 °C)] 100.5 °F (38.1 °C)  HR:  [87-99] 87  Resp:  [20] 20  BP: (107-132)/(72-75) 132/73  O2 Device: None (Room air)          I/O last 24 hours:  In: 390 [NG/GT:390]  Out: 279 [Urine:279]  Lines/Drains/Airways       Active Status       Name Placement date Placement time Site Days    Gastrostomy/Enterostomy  16 Fr. LUQ 08/26/24  1643  LUQ  24                  Physical Exam  Vitals and nursing note reviewed.   Constitutional:       Appearance: She is well-developed.   HENT:      Head: Normocephalic and atraumatic.   Eyes:      Conjunctiva/sclera: Conjunctivae normal.   Cardiovascular:      Rate and Rhythm: Normal rate and regular rhythm.      Heart sounds: No murmur  heard.  Pulmonary:      Effort: Pulmonary effort is normal. No respiratory distress.      Breath sounds: Normal breath sounds.   Abdominal:      Palpations: Abdomen is soft.      Tenderness: There is no abdominal tenderness. There is no right CVA tenderness, left CVA tenderness or guarding.   Musculoskeletal:         General: No swelling, tenderness or signs of injury.      Cervical back: Neck supple.   Skin:     General: Skin is warm and dry.      Capillary Refill: Capillary refill takes less than 2 seconds.   Neurological:      Mental Status: She is alert.   Psychiatric:         Mood and Affect: Mood normal.

## 2024-09-19 NOTE — ASSESSMENT & PLAN NOTE
- s/p PEG tube on 8/26 during this admission  - Per group home, pt attends day program which cannot manage tube feeds so requesting her to receive it from 6pm to 6am. Settings adjusted to take this into consideration per nutrition. Nutrition's recommendations to change tube feed from Glucerna to Nepro d/t lower potassium content of the latter. Originally switched from Glucerna to Nepro d/t elevated potassium; however pt tolerated Glucerna better as evidenced by glucose readings.     Plan:  See plan under type 2 diabetes

## 2024-09-19 NOTE — INCIDENTAL FINDINGS
The following findings require follow up:  Radiographic finding   Finding:      CT chest abdomen pelvis w contrast: Cystitis with wall thickening the urinary bladder and mucosal enhancement. No findings of upper urinary tract infection., Distention of the colon probably representing chronic colonic pseudoobstruction or adynamic colonic ileus., Decubitus pressure change in the subcutaneous tissues along the bilateral buttocks., Increasing dependent density in the lungs probably representing increasing atelectasis and possible superimposed aspiration. Early aspiration pneumonia is not excluded., Layering secretions in the trachea, Focus of hyperenhancement along the anterior lower uterine segment. Differential diagnosis would include a cervical mass or fibroid. Correlation with pelvic ultrasound or MRI may be considered., The study was marked in EPIC for immediate notification., Workstation performed: [id number]   CT abdomen pelvis wo contrast: Findings compatible with open PEG tube placement. No definite perforation identified., Persistent findings of cystitis., Increased dependent lung opacities, usually atelectasis, but cannot exclude consolidation., Workstation performed: [id number]   CT chest abdomen pelvis w contrast: 1. New opacification of the right lower lobe bronchus with sequela of aspiration in the right lower lobe., 2. No acute findings in the abdomen and pelvis., 3. There is an indeterminate 1.0 cm left renal interpolar lesion, for which dedicated renal sonogram is recommended on a nonemergent basis if not previously performed., 4. Multiple indeterminate nodular densities in the right breast, possibly reflecting sequela of trauma/fat necrosis. Recommend correlation with clinical history and consideration for dedicated breast imaging, as clinically appropriate., 5. Postsurgical sequela related to gastrostomy tube placement., The study was marked in EPIC for immediate notification., Workstation performed:  [id number]     Follow up required: No, this was addressed during hospital stay.    Incidental finding results were discussed with the Patient's POA and Patient's Legal Guardian by Yolanda Kay DO on 09/19/24.   They expressed understanding and all questions answered.

## 2024-09-19 NOTE — NUTRITION
Plan for pt to return to group home. TF regimen at discharge:    Glucerna 1.2 @ 80 ml/hr X 12 hrs to run nightly. Water flushes of 125 ml q 4 hrs while TF is running. EN regimen provides 960 ml total volume, 1152 kcals, 58 grams of protein, 109 g CHO, and 1148 ml total water.

## 2024-09-19 NOTE — ASSESSMENT & PLAN NOTE
Lab Results   Component Value Date    HGBA1C 6.2 (H) 08/13/2024     Recent Labs     09/18/24  2325 09/19/24  0258 09/19/24  0551 09/19/24  1100   POCGLU 225* 121 169* 100     Blood Sugar Average: Last 72 hrs:  (P) 161.3578867755663450    Pt having hyperglycemic episodes in the setting of tube feeds, possibly related to refeeding syndrome although pt has not had electrolyte abnormalities. Nutrition switched feed from Jevity 1.5 to Glucerna 1.2 for hyperglycemia, then to Nepro on 9/12 for hyperkalemia. Pt was doing well on 25u Lantus however became hypoglycemic; Lantus decreased to 18u on 9/6, then to 15u on 9/7. On 9/8 switched Lantus 15u to NPH 15u to prevent evening hypoglycemia in the setting of cyclic tube feeds; however pt still hyperglycemic w BG>200 in the AM.  9/11 NPH 15u evening and NPH 5u morning. 9/12 discontinued morning NPH d/t patient's mid-morning hypoglycemia.     Plan:  Endocrinology consulted, appreciate recommendations.  Follow-up with endocrinology in the outpatient setting  Discharge home on:  Regular insulin 4 units q6h  Regular insulin SSI AG 1    Blood Glucose 150 - 224: 1 Unit of Insulin    Blood Glucose 225 - 299: 2 Units of Insulin    Blood Glucose 300 - 374: 3 Units of Insulin    Blood Glucose 375 - 449: 4 Units of Insulin    Blood Glucose greater than or equal to 450: 5 Units of Insulin  Continue Jevity tube feed 6p-6a

## 2024-09-19 NOTE — ASSESSMENT & PLAN NOTE
Pt noted to be hypotensive intermittently throughout hospitalization. Per caregivers and record review, pt's BP tends to run on softer side. Pt has hx of PD which could contribute to autonomic dysfunction as well. Pt's BP have been appropriate since initiation of Midodrine 5 mg TID.     Continue midodrine 5 mg TID

## 2024-09-19 NOTE — ASSESSMENT & PLAN NOTE
A1c (8/2024): 6.2%  - home regimen: Metformin 1000 BID, Glimepiride 1 mg daily  - Tube feeds via PEG: cyclic (q12hrs); Glucerna 1.2, 80 mL/hr  - currently on Regular Insulin 4 units every 6 hours with correctional scale (regular insulin, A1)  - Blood glucose in the past 24 hrs ranging from 100 to 225  With highest values around evening 11 pm to midnight and early morning 6 am  -Per nurse, No residuals drawn with before each feed   -No hyper/hypoglycemic events  -has required 15 units of correctional insulin over the past 12hrs  PLAN  Continue Regular insulin 4 units every 6 hrs  Continue correctional insulin A1 with regular insulin

## 2024-09-19 NOTE — DISCHARGE SUMMARY
Discharge Summary - Hospitalist   Name: Terrie Alicea 64 y.o. female I MRN: [id number]  Unit/Bed#: S -01 I Date of Admission: 8/24/2024   Date of Service: 9/19/2024 I Hospital Day: 26     Assessment & Plan  Dysphagia  - s/p PEG tube on 8/26 during this admission  - Per group home, pt attends day program which cannot manage tube feeds so requesting her to receive it from 6pm to 6am. Settings adjusted to take this into consideration per nutrition. Nutrition's recommendations to change tube feed from Glucerna to Nepro d/t lower potassium content of the latter. Originally switched from Glucerna to Nepro d/t elevated potassium; however pt tolerated Glucerna better as evidenced by glucose readings.     Plan:  See plan under type 2 diabetes  Diabetes mellitus type 2, noninsulin dependent (HCC)  Lab Results   Component Value Date    HGBA1C 6.2 (H) 08/13/2024     Recent Labs     09/18/24  2325 09/19/24  0258 09/19/24  0551 09/19/24  1100   POCGLU 225* 121 169* 100     Blood Sugar Average: Last 72 hrs:  (P) 161.1700321030699164    Pt having hyperglycemic episodes in the setting of tube feeds, possibly related to refeeding syndrome although pt has not had electrolyte abnormalities. Nutrition switched feed from Jevity 1.5 to Glucerna 1.2 for hyperglycemia, then to Nepro on 9/12 for hyperkalemia. Pt was doing well on 25u Lantus however became hypoglycemic; Lantus decreased to 18u on 9/6, then to 15u on 9/7. On 9/8 switched Lantus 15u to NPH 15u to prevent evening hypoglycemia in the setting of cyclic tube feeds; however pt still hyperglycemic w BG>200 in the AM.  9/11 NPH 15u evening and NPH 5u morning. 9/12 discontinued morning NPH d/t patient's mid-morning hypoglycemia.     Plan:  Endocrinology consulted, appreciate recommendations.  Follow-up with endocrinology in the outpatient setting  Discharge home on:  Regular insulin 4 units q6h  Regular insulin SSI AG 1    Blood Glucose 150 - 224: 1 Unit of Insulin    Blood  Glucose 225 - 299: 2 Units of Insulin    Blood Glucose 300 - 374: 3 Units of Insulin    Blood Glucose 375 - 449: 4 Units of Insulin    Blood Glucose greater than or equal to 450: 5 Units of Insulin  Continue Jevity tube feed 6p-6a  Fever  Patient had intermittent fevers Tmax 102.1F. Symptomatic relief with IV Tylenol. Current workup is pending to rule-out source of fever. No leukocytosis or tachycardia today. Suspecting microaspiration pneumonia secondary to reflux of tube feeds  Hypotension  Pt noted to be hypotensive intermittently throughout hospitalization. Per caregivers and record review, pt's BP tends to run on softer side. Pt has hx of PD which could contribute to autonomic dysfunction as well. Pt's BP have been appropriate since initiation of Midodrine 5 mg TID.     Continue midodrine 5 mg TID  Cerebral palsy (HCC)  Continue baclofen 10 mg TID  At baseline mentation  Incontinence  Continue oxybutynin 5 mg TID  UA WNL  Parkinson's disease  History of mood disturbances, previously on neuroleptics. Tardive dyskinesia on exam.    Sinemet  qid. Can defer changes to outpatient neurology.   Mood disturbances  Continue home meds: Abilify 2 mg, Citalopram 40 mg.  Pt previously on trazodone 100 mg q HS which was discontinued on 8/30 while in the ICU d/t contributions to sedation.    Continue Depakene oral solution 125 mg q12h for mood, can defer changes per outpatient psychiatry as deemed necessary.  Pt has been stable from this standpoint throughout hospitalization, hasn't required restarting of trazodone or escalation in valproic acid thus far.  [facility] requested psychiatric evaluation/clearance for rehab placement d/t a chart history of bipolar and IDD. Per psychiatry, pt is stable on current regimen, no indication for inpatient psychiatric treatment, and pt is not an imminent risk of harm to herself or others.   Hypothyroidism  Home med: Levothyroxine 25 mcg    Plan:  Per endocrinology, continue home  levothyroxine at 1700, 1 hour prior to starting tube feeds     Medical Problems       Resolved Problems  Date Reviewed: 9/19/2024            Resolved    UTI (urinary tract infection) 9/9/2024     Resolved by  Silvana Mike DO    Aspiration pneumonia (HCC) 9/10/2024     Resolved by  Silvana Mike DO    Overview Deleted 9/3/2024  2:16 PM by Silvana Mike DO                Discharging Physician / Practitioner: Yolanda Kay DO  PCP: Gunnar Sylvester DO  Admission Date:   Admission Orders (From admission, onward)       Ordered        08/24/24 1613  INPATIENT ADMISSION  Once                          Discharge Date: 09/19/24    Consultations During Hospital Stay:  General surgery, nutrition services, psychiatry, endocrinology    Procedures Performed:   PEG tube placement    Significant Findings / Test Results:   CXR (8/24) - clear lungs with interval resolution of right basilar airspace opacity  CT soft tissue neck w/ (8/24) - no acute findings  CT C/A/P with (8/24) - cystitis with wall thickening of the urinary bladder and mucosal enhancement.  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  Increasing dependent density in the lungs probably representing increasing atelectasis and possible superimposed aspiration.  Early aspiration pneumonia is not excluded.  CXR (8/30) - No acute cardiopulmonary disease.  XR abd KUB (8/30) - Nonobstructive bowel gas pattern. Large volume of stool suggestive of constipation.  CT C/A/P without (8/30) - Findings compatible with open PEG tube placement. No definite perforation identified. Persistent findings of cystitis. Increased dependent lung opacities, usually atelectasis, but cannot exclude consolidation.  CXR (9/1) - no acute cardiopulmonary disease  CTA C/A/P (9/3) - New opacification of the right lower lobe bronchus with sequela of aspiration in the right lower lobe. No acute findings in the abdomen and pelvis. There is an indeterminate 1.0  cm left renal interpolar lesion, for which dedicated renal sonogram is recommended on a nonemergent basis if not previously performed. Multiple indeterminate nodular densities in the right breast, possibly reflecting sequela of trauma/fat necrosis. Recommend correlation with clinical history and consideration for dedicated breast imaging, as clinically appropriate. Postsurgical sequela related to gastrostomy tube placement.  CXR (9/15) - No acute cardiopulmonary disease    Incidental Findings:    CT chest abdomen pelvis w contrast: Cystitis with wall thickening the urinary bladder and mucosal enhancement. No findings of upper urinary tract infection., Distention of the colon probably representing chronic colonic pseudoobstruction or adynamic colonic ileus., Decubitus pressure change in the subcutaneous tissues along the bilateral buttocks., Increasing dependent density in the lungs probably representing increasing atelectasis and possible superimposed aspiration. Early aspiration pneumonia is not excluded., Layering secretions in the trachea, Focus of hyperenhancement along the anterior lower uterine segment. Differential diagnosis would include a cervical mass or fibroid. Correlation with pelvic ultrasound or MRI may be considered., The study was marked in EPIC for immediate notification., Workstation performed: [id number]   CT abdomen pelvis wo contrast: Findings compatible with open PEG tube placement. No definite perforation identified., Persistent findings of cystitis., Increased dependent lung opacities, usually atelectasis, but cannot exclude consolidation., Workstation performed: [id number]   CT chest abdomen pelvis w contrast: 1. New opacification of the right lower lobe bronchus with sequela of aspiration in the right lower lobe., 2. No acute findings in the abdomen and pelvis., 3. There is an indeterminate 1.0 cm left renal interpolar lesion, for which dedicated renal sonogram is recommended on a  nonemergent basis if not previously performed., 4. Multiple indeterminate nodular densities in the right breast, possibly reflecting sequela of trauma/fat necrosis. Recommend correlation with clinical history and consideration for dedicated breast imaging, as clinically appropriate., 5. Postsurgical sequela related to gastrostomy tube placement., The study was marked in EPIC for immediate notification    Test Results Pending at Discharge (will require follow up):   No     Outpatient Tests Requested:  No    Complications:  Dysphagia requiring PEG tube placement    Reason for Admission: Altered mental status, fever    Hospital Course:   Terrie Alicea is a 64 y.o. female patient who originally presented to the hospital on 8/24/2024 due to poor PO intake, fever, altered mental status from group home. She had been discharged from her previous hospital admission on antibiotics for UTI and pneumonia. Upon admission, her home medications were continued, and the course of IV antibiotics was completed for the previously diagnosed UTI and pneumonia.  During this admission, she underwent open gastrostomy tube placement on 8/26/2024. On 8/30 she was noted to be hypotensive with minimal response to fluid bolus, albumin, and transferred to ICU step down level 1 with improvement on brief course of Levophed, after which she was able to return to [facility] floor. She then intermittently spiked fevers, original extensive workup was unremarkable, no leukocytosis or elevated procalcitonin. Pt was managed symptomatically. Repeat CT abdomen/pelvis showed aspiration pneumonia; patient was started on course of IV Rocephin with improvement. After PEG tube placement, she was started on tube feeds, which required fine-tuning due to episodes of hypoglycemia, hyperglycemia, hyperkalemia.  Per patient's group home, they can only accommodate nocturnal tube feeds from 6 PM to 6 AM as patient has to go to activities during the day.  She was tried  on Jevity, Nepro, and Glucerna tube feeds.  Due to labile blood sugars, endocrinology was consulted.  Discharge insulin regimen was selected to be insulin regular 4 units every 6 hours along with insulin regular sliding scale sliding scale algorithm 1 on nocturnal Glucerna 1.2 tube feeds 80 mL/hr from 6 pm to 6 am with 125 mL water tube feed flushes every 4 hours. [facility] requested psychiatric clearance for rehab placement due to a chart history of bipolar and IDD. Per psychiatry, patient is stable on current regimen, no indication for inpatient psychiatric treatment, and patient is not an imminent risk of harm to herself or others. Medications added during this hospital admission included: Midodrine 5 mg 3 times per day, valproic acid 125 mg 2 times per day, switching levothyroxine 25 mcg from first thing in the morning to 1 hour before start of tube feeds as per endocrinology recommendations.  Ambulatory referral for endocrinology was provided.  Plan for outpatient follow-up with primary care physician.  Patient will be discharged to [facility].  Stable for discharge.    Please see above list of diagnoses and related plan for additional information.     Condition at Discharge: stable    Discharge Day Visit / Exam:   Subjective: Patient was seen and examined at bedside.  She is not interactive, noncommunicative, nonverbal.  Does not appear to be in any acute distress.  Vitals: Blood Pressure: 132/73 (09/19/24 0755)  Pulse: 87 (09/19/24 1006)  Temperature: 100.5 °F (38.1 °C) (09/19/24 1006)  Temp Source: Axillary (09/18/24 1941)  Respirations: 20 (09/19/24 0755)  Weight - Scale: 40.8 kg (89 lb 15.2 oz) (09/19/24 0600)  SpO2: 95 % (09/19/24 1006)  Exam:   Physical Exam  Constitutional:       Appearance: Normal appearance.   HENT:      Head: Normocephalic and atraumatic.      Mouth/Throat:      Mouth: Mucous membranes are dry.      Comments: Dystonic orofacial movements  Cardiovascular:      Rate  and Rhythm: Normal rate and regular rhythm.      Heart sounds: No murmur heard.  Pulmonary:      Breath sounds: No wheezing, rhonchi or rales.   Chest:      Chest wall: No tenderness.   Abdominal:      General: Bowel sounds are normal. There is no distension.      Palpations: Abdomen is soft.      Tenderness: There is no abdominal tenderness. There is no guarding or rebound.   Musculoskeletal:      Cervical back: Normal range of motion.      Right lower leg: No edema.      Left lower leg: No edema.      Comments: Chronic contractures to all extremities   Skin:     Capillary Refill: Capillary refill takes 2 to 3 seconds.   Neurological:      Mental Status: She is alert.      Comments: Opens eyes, not interactive, nonverbal        Discussion with Family: Updated  (caregiver Lisa) via phone.    Discharge instructions/Information to patient and family:   See after visit summary for information provided to patient and family.      Provisions for Follow-Up Care:  See after visit summary for information related to follow-up care and any pertinent home health orders.      Mobility at time of Discharge:   Basic Mobility Inpatient Raw Score: 6  JH-HLM Goal: 2: Bed activities/Dependent transfer  JH-HLM Achieved: 2: Bed activities/Dependent transfer  HLM Goal achieved. Continue to encourage appropriate mobility.     Disposition:   Other Skilled Nursing Facility at [facility]    Planned Readmission: No    Discharge Medications:  See after visit summary for reconciled discharge medications provided to patient and/or family.      Administrative Statements   Discharge Statement:  I have spent a total time of 30 minutes in caring for this patient on the day of the visit/encounter. .    **Please Note: This note may have been constructed using a voice recognition system**

## 2024-09-20 ENCOUNTER — TRANSITIONAL CARE MANAGEMENT (OUTPATIENT)
Dept: FAMILY MEDICINE CLINIC | Facility: CLINIC | Age: 64
End: 2024-09-20

## 2024-09-20 ENCOUNTER — NURSING HOME VISIT (OUTPATIENT)
Dept: GERIATRICS | Facility: OTHER | Age: 64
End: 2024-09-20
Payer: MEDICARE

## 2024-09-20 DIAGNOSIS — F99 PSYCHIATRIC DIAGNOSIS: ICD-10-CM

## 2024-09-20 DIAGNOSIS — R13.12 OROPHARYNGEAL DYSPHAGIA: Primary | ICD-10-CM

## 2024-09-20 DIAGNOSIS — G80.0 SPASTIC QUADRIPLEGIC CEREBRAL PALSY (HCC): ICD-10-CM

## 2024-09-20 DIAGNOSIS — G20.A1 PARKINSON'S DISEASE, UNSPECIFIED WHETHER DYSKINESIA PRESENT, UNSPECIFIED WHETHER MANIFESTATIONS FLUCTUATE (HCC): ICD-10-CM

## 2024-09-20 DIAGNOSIS — I95.9 HYPOTENSION, UNSPECIFIED HYPOTENSION TYPE: ICD-10-CM

## 2024-09-20 DIAGNOSIS — Z79.4 DIABETES MELLITUS TYPE 2, INSULIN DEPENDENT (HCC): ICD-10-CM

## 2024-09-20 DIAGNOSIS — E11.9 DIABETES MELLITUS TYPE 2, INSULIN DEPENDENT (HCC): ICD-10-CM

## 2024-09-20 DIAGNOSIS — E03.8 OTHER SPECIFIED HYPOTHYROIDISM: ICD-10-CM

## 2024-09-20 DIAGNOSIS — R32 URINARY INCONTINENCE, UNSPECIFIED TYPE: ICD-10-CM

## 2024-09-20 PROCEDURE — 99306 1ST NF CARE HIGH MDM 50: CPT | Performed by: FAMILY MEDICINE

## 2024-09-20 RX ORDER — OXYBUTYNIN CHLORIDE 5 MG/1
5 TABLET ORAL DAILY
Status: ON HOLD
Start: 2024-09-20

## 2024-09-20 RX ORDER — LORAZEPAM 0.5 MG/1
0.5 TABLET ORAL
Qty: 10 TABLET | Refills: 0 | Status: ON HOLD | OUTPATIENT
Start: 2024-09-20 | End: 2024-09-30

## 2024-09-20 NOTE — UTILIZATION REVIEW
NOTIFICATION OF ADMISSION DISCHARGE   This is a Notification of Discharge from [facility]. Please be advised that this patient has been discharge from our facility. Below you will find the admission and discharge date and time including the patient’s disposition.   UTILIZATION REVIEW CONTACT:  Shyla Garcia  Utilization   Network Utilization Review Department  Phone: [phone] x carefully listen to the prompts. All voicemails are confidential.  Email: [email or web address]     ADMISSION INFORMATION  PRESENTATION DATE: 8/24/2024  8:56 AM  OBERVATION ADMISSION DATE: N/A  INPATIENT ADMISSION DATE: 8/24/24  4:13 PM   DISCHARGE DATE: 9/19/2024  5:19 PM   [facility]    Network Utilization Review Department  ATTENTION: Please call with any questions or concerns to [phone] and carefully listen to the prompts so that you are directed to the right person. All voicemails are confidential.   For Discharge needs, contact Care Management DC Support Team at [phone] opt. 2  Send all requests for admission clinical reviews, approved or denied determinations and any other requests to dedicated fax number below belonging to the campus where the patient is receiving treatment. List of dedicated fax numbers for the Facilities:  FACILITY NAME UR FAX NUMBER   ADMISSION DENIALS (Administrative/Medical Necessity) [phone]   DISCHARGE SUPPORT TEAM ([facility]) [phone]   PARENT CHILD HEALTH (Maternity/NICU/Pediatrics) [phone]   [facility] [phone]   [facility] [phone]   [facility] [phone]   General acute hospital [phone]   [facility] [phone]   [facility] [phone]   [facility] [phone]   [facility]  [phone]   [facility] [phone]   [facility] [phone]   [facility] [phone]   [facility] ([place], [place]) [phone]

## 2024-09-20 NOTE — PROGRESS NOTES
[facility]  [address]  [place] [address]   [facility]  History and Physical    NAME: Terrie Alicea  AGE: 64 y.o. SEX: female [id number]    DATE OF ENCOUNTER: 9/20/2024    Code status:  CPR    Assessment and Plan   1. Oropharyngeal dysphagia  S/p peg tube placement 8/26  Feeds to be given from 6PM to 6AM so patient may attend day program which cannot administer PEG tube feeds  Glucerna 1.2 tube feeds 80mL/hr with 125 mL water tube feed flushes every 4 hours    2. Other specified hypothyroidism  Levothyroxine 25 mcg, to be given 1 hour prior to feeds    3. Diabetes mellitus type 2, insulin dependent (HCC)  seen by endocrine during admission.   Insulin regimen: reg insulin 4U q6 hours with sliding scale  Blood Glucose 150 - 224: 1 Unit of Insulin  Blood Glucose 225 - 299: 2 Units of Insulin  Blood Glucose 300 - 374: 3 Units of Insulin  Blood Glucose 375 - 449: 4 Units of Insulin  Blood Glucose greater than or equal to 450: 5 Units of Insulin  Continue Jevity tube feed 6p-6a  May modify insulin regimen to make it less intensive  monitor BID accucheck    4. Parkinson's disease, unspecified whether dyskinesia present, unspecified whether manifestations fluctuate  Sinemet  qid  Follow up with outpatient neurology scheduled 12/30/2024    5. Spastic quadriplegic cerebral palsy (HCC)  Continue baclofen 10 mg TID  Would consider palliative care consultation for discussion of symptom management    6. Mood disturbances  Continue home meds: Abilify 2 mg, citalopram 40 mg  Continue Depakene solution 125 mg q12 hours  Also prescribed ativan 0.5 mg HS  Discontinued trazodone while inpatient, continue to hold for now    7. Urinary incontinence, unspecified type  Continue oxybutynin 5 mg decrease dosing to daily    8. Hypotension, unspecified hypotension type  Monitor blood pressure  Continue midodrine 5 mg TID    All medications and routine orders were reviewed and updated  as needed.    Chief Complaint     Seen for admission at Nursing Facility    History of Present Illness   Terrie Alicea is a 63 yo F who presented on 8/24 due to poor PO intake, fever and AMS from group home. She was treated for UTI/pneumonia while admitted. She also underwent G-tube placement on 8/26/2024. She required a brief stay in the ICU for pressors in the setting of hypotension. Found to have aspiration pna on repeat imaging, and was treated with a course of rocephin. During stay, endocrinology was consulted, and tube feeds/insulin were fine tuned. Psychiatry clearance was also obtained, who deemed patient was stable on current regimen.     On exam today patient is lying comfortably in bed. Opens eyes to touch, but not to name initially. No cough, difficulty breathing appreciated.      HISTORY:  Past Medical History:   Diagnosis Date    Acute metabolic encephalopathy 12/11/2015    Adjustment disorder     Altered mental status 12/11/2015    Anemia     Bipolar 1 disorder ([facility])     Cerebral palsy ([facility])     Chronic hypernatremia 02/06/2016    Closed fracture of left hip ([facility]) 01/19/2016    Closed left hip fracture ([facility])     no surgery    Constipation     Dehydration 02/20/2016    Developmental delay     Diabetes mellitus ([facility])     Difficulty walking     Disease of thyroid gland     Diverticulosis     Dysphagia     Worsening    Fracture of multiple ribs of right side     Herpes zoster without complication 06/02/2021    Hip fracture ([facility]) 07/26/2015    left    Hyperlipidemia     Hypernatremia 12/28/2018    Hypotension     Impulse control disorder     Incontinence     Intellectual disability due to developmental disorder, unspecified     Microalbuminuria     Neuropathy in diabetes ([facility])     Osteopathia     Osteoporosis     Peripheral neuropathy     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[facility])     Thrombocytopenia ([facility]) 07/31/2015     Family History   Problem Relation Age of Onset    Alcohol abuse Mother     Alcohol abuse Father      Diabetes Sister     No Known Problems Sister     No Known Problems Sister      Social History     Socioeconomic History    Marital status: Single     Spouse name: Not on file    Number of children: Not on file    Years of education: Not on file    Highest education level: Not on file   Occupational History    Not on file   Tobacco Use    Smoking status: Never    Smokeless tobacco: Never   Vaping Use    Vaping status: Never Used   Substance and Sexual Activity    Alcohol use: Not Currently    Drug use: No    Sexual activity: Never   Other Topics Concern    Not on file   Social History Narrative    Lives in a group home      Social Determinants of Health     Financial Resource Strain: Low Risk  (5/15/2024)    Overall Financial Resource Strain (CARDIA)     Difficulty of Paying Living Expenses: Not hard at all   Food Insecurity: No Food Insecurity (8/27/2024)    Hunger Vital Sign     Worried About Running Out of Food in the Last Year: Never true     Ran Out of Food in the Last Year: Never true   Transportation Needs: No Transportation Needs (8/27/2024)    PRAPARE - Transportation     Lack of Transportation (Medical): No     Lack of Transportation (Non-Medical): No   Physical Activity: Inactive (11/2/2023)    Exercise Vital Sign     Days of Exercise per Week: 0 days     Minutes of Exercise per Session: 0 min   Stress: No Stress Concern Present (5/20/2024)    [employer] [place] [employer] - Occupational Stress Questionnaire     Feeling of Stress : Not at all   Social Connections: Socially Isolated (11/2/2023)    Social Connection and Isolation Panel [NHANES]     Frequency of Communication with Friends and Family: Never     Frequency of Social Gatherings with Friends and Family: Never     Attends [employer] Services: Never     Active Member of Clubs or Organizations: No     Attends Club or Organization Meetings: Never     Marital Status: Never    Intimate Partner Violence: Not At Risk (5/20/2024)     Humiliation, Afraid, Rape, and Kick questionnaire     Fear of Current or Ex-Partner: No     Emotionally Abused: No     Physically Abused: No     Sexually Abused: No   Housing Stability: Low Risk  (8/27/2024)    Housing Stability Vital Sign     Unable to Pay for Housing in the Last Year: No     Number of Times Moved in the Last Year: 0     Homeless in the Last Year: No     Allergies:  Allergies   Allergen Reactions    Ingrezza [Valbenazine Tosylate] Other (See Comments)     Behavioral changes per caregiver    Keflex [Cephalexin] GI Intolerance       Review of Systems     Review of Systems   Unable to perform ROS: Patient nonverbal     Medications and orders     All medications reviewed and updated in [facility] EMR.    Objective   Vitals:   Temp 98.2  HR 69  RR 16  /73  SpO2 99%  Wt 85.6 lbs    Physical Exam  Vitals and nursing note reviewed.   Constitutional:       General: She is not in acute distress.     Comments: Frail, laying comfortably in bed.   HENT:      Head: Normocephalic and atraumatic.      Nose: Nose normal.      Mouth/Throat:      Mouth: Mucous membranes are dry.   Cardiovascular:      Rate and Rhythm: Normal rate and regular rhythm.      Pulses: Normal pulses.   Pulmonary:      Effort: Pulmonary effort is normal. No respiratory distress.      Breath sounds: No wheezing, rhonchi or rales.   Abdominal:      General: There is no distension.      Palpations: Abdomen is soft.      Tenderness: There is no abdominal tenderness.      Comments: PEG site clean, dry, no signs of infection   Musculoskeletal:      Right lower leg: No edema.      Left lower leg: No edema.      Comments: +chronic contractures of all extremities   Skin:     General: Skin is warm and dry.   Neurological:      Comments: Nonverbal, does not respond     Pertinent Laboratory/Diagnostic Studies:   The following labs/studies were reviewed please see chart or hospital paperwork for details.  CBC and differential  Order: [id number]    Status: Final result       Visible to patient: Yes (seen)       Next appt: 09/25/2024 at 08:30 AM in Radiology ([facility])    0 Result Notes              Component  Ref Range & Units 9/19/24 1143 9/17/24 0628 9/16/24 0621 9/15/24 0624 9/14/24 0542 9/13/24 1309 9/12/24 0514   WBC  4.31 - 10.16 Thousand/uL 7.23 6.65 6.37 6.44 5.96 5.86 5.53   RBC  3.81 - 5.12 Million/uL 3.03 Low  3.15 Low  3.12 Low  3.04 Low  2.78 Low  2.82 Low  2.65 Low    Hemoglobin  11.5 - 15.4 g/dL 10.0 Low  10.5 Low  10.3 Low  9.9 Low  9.1 Low  9.2 Low  8.7 Low    Hematocrit  34.8 - 46.1 % 30.8 Low  32.6 Low  32.0 Low  31.5 Low  29.0 Low  29.1 Low  29.0 Low    MCV  82 - 98 fL 102 High  104 High  103 High  104 High  104 High  102 High   High    MCH  26.8 - 34.3 pg 33.0 33.3 33.0 32.6 32.7 33.0 32.8   MCHC  31.4 - 37.4 g/dL 32.5 32.2 32.2 31.4 31.4 32.2 30.0 Low    RDW  11.6 - 15.1 % 13.7 13.8 13.6 13.5 13.4 13.6 13.8   MPV  8.9 - 12.7 fL 11.8 12.3 12.3 12.2 12.4 11.9 12.4   Platelets  149 - 390 Thousands/uL 202 212 215 198 212 213 164   nRBC  /100 WBCs 0 0 0 0      Segmented %  43 - 75 % 53 40 Low  44 65      Immature Grans %  0 - 2 % 0 1 1 0      Lymphocytes %  14 - 44 % 36 40 36 23      Monocytes %  4 - 12 % 8 10 9 8      Eosinophils Relative  0 - 6 % 2 8 High  9 High  3      Basophils Relative  0 - 1 % 1 1 1 1      Absolute Neutrophils  1.85 - 7.62 Thousands/µL 3.79 2.71 2.87 4.22      Absolute Immature Grans  0.00 - 0.20 Thousand/uL 0.03 0.03 0.03 0.01      Absolute Lymphocytes  0.60 - 4.47 Thousands/µL 2.61 2.63 2.28 1.45      Absolute Monocytes  0.17 - 1.22 Thousand/µL 0.61 0.65 0.57 0.52      Eosinophils Absolute  0.00 - 0.61 Thousand/µL 0.13 0.56 0.57 0.21      Basophils Absolute  0.00 - 0.10 Thousands/µL 0.06 0.07 0.05 0.03         ontains abnormal data Basic metabolic panel  Order: [id number]   Status: Final result       Visible to patient: Yes (seen)       Next appt: 09/25/2024 at 08:30 AM in Radiology ([facility])    0  Result Notes       1  Topic            Component  Ref Range & Units 9/19/24 0604 9/18/24 0552 9/17/24 0628 9/16/24 0621 9/15/24 0624 9/14/24 0542 9/13/24 1309   Sodium  135 - 147 mmol/L 138 138 138 137 137 140 138   Potassium  3.5 - 5.3 mmol/L 4.8 4.7 4.9 4.8 4.5 4.8 4.6   Chloride  96 - 108 mmol/L 98 98 97 97 98 99 97   CO2  21 - 32 mmol/L 32 34 High  35 High  35 High  CM 29 CM 38 High  36 High    ANION GAP  4 - 13 mmol/L 8 6 6 5 10 3 Low  5   BUN  5 - 25 mg/dL 34 High  35 High  35 High  32 High  32 High  35 High  34 High    Creatinine  0.60 - 1.30 mg/dL 1.11 1.00 CM 1.09 CM 1.02 CM 1.06 CM 1.13 CM 1.15 CM   Comment: Standardized to IDMS reference method   Glucose  65 - 140 mg/dL 176 High  106  High   High   High   High   High  CM   Comment: If the patient is fasting, the ADA then defines impaired fasting glucose as > 100 mg/dL and diabetes as > or equal to 123 mg/dL.   Calcium  8.4 - 10.2 mg/dL 9.7 9.6 9.8 9.7 10.7 High  CM 10.7 High  CM 10.7 High  CM   eGFR  ml/min/1.73sq m 53 60 54 59 56 52 51      Fingerstick Glucose (POCT)  Order: [id number]   Status: Final result       Visible to patient: Yes (seen)       Next appt: 09/25/2024 at 08:30 AM in Radiology ([facility] 1)    0 Result Notes            Component  Ref Range & Units 9/19/24 1100 9/19/24 0551 9/19/24 0258 9/18/24 2325 9/18/24 2137 9/18/24 1826 9/18/24 1146   POC Glucose  65 - 140 mg/dl 100 169 High  121 225 High  197 High  136 137        - Counseling Documentation: patient was counseled regarding: instructions for management, risk factor reductions, and importance of compliance with treatment    Radha Herbert MD  9/20/2024, 9:04 AM  PGY-2 Family Medicine [place]

## 2024-09-23 ENCOUNTER — TELEPHONE (OUTPATIENT)
Age: 64
End: 2024-09-23

## 2024-09-23 ENCOUNTER — NURSING HOME VISIT (OUTPATIENT)
Dept: GERIATRICS | Facility: OTHER | Age: 64
End: 2024-09-23
Payer: MEDICARE

## 2024-09-23 VITALS
SYSTOLIC BLOOD PRESSURE: 100 MMHG | HEART RATE: 72 BPM | OXYGEN SATURATION: 97 % | RESPIRATION RATE: 18 BRPM | WEIGHT: 86.9 LBS | BODY MASS INDEX: 18.16 KG/M2 | TEMPERATURE: 97.8 F | DIASTOLIC BLOOD PRESSURE: 66 MMHG

## 2024-09-23 DIAGNOSIS — E11.9 TYPE 2 DIABETES MELLITUS WITHOUT COMPLICATION, WITH LONG-TERM CURRENT USE OF INSULIN (HCC): ICD-10-CM

## 2024-09-23 DIAGNOSIS — R13.12 OROPHARYNGEAL DYSPHAGIA: ICD-10-CM

## 2024-09-23 DIAGNOSIS — Z79.4 DIABETES MELLITUS TYPE 2, INSULIN DEPENDENT (HCC): Primary | ICD-10-CM

## 2024-09-23 DIAGNOSIS — G80.0 SPASTIC QUADRIPLEGIC CEREBRAL PALSY (HCC): ICD-10-CM

## 2024-09-23 DIAGNOSIS — Z79.4 TYPE 2 DIABETES MELLITUS WITHOUT COMPLICATION, WITH LONG-TERM CURRENT USE OF INSULIN (HCC): ICD-10-CM

## 2024-09-23 DIAGNOSIS — E11.9 DIABETES MELLITUS TYPE 2, INSULIN DEPENDENT (HCC): Primary | ICD-10-CM

## 2024-09-23 DIAGNOSIS — I95.9 HYPOTENSION, UNSPECIFIED HYPOTENSION TYPE: ICD-10-CM

## 2024-09-23 DIAGNOSIS — E11.9 DIABETES MELLITUS TYPE 2, NONINSULIN DEPENDENT (HCC): ICD-10-CM

## 2024-09-23 PROCEDURE — 99316 NF DSCHRG MGMT 30 MIN+: CPT | Performed by: NURSE PRACTITIONER

## 2024-09-23 RX ORDER — RIBOFLAVIN (VITAMIN B2) 100 MG
100 TABLET ORAL DAILY
Status: ON HOLD | COMMUNITY

## 2024-09-23 RX ORDER — TRAZODONE HYDROCHLORIDE 100 MG/1
100 TABLET ORAL
Status: ON HOLD | COMMUNITY

## 2024-09-23 NOTE — ASSESSMENT & PLAN NOTE
S/p PEG tube placement 8/26/2024  Lives in group home and attends a day program and needs feeding overnight  Continue TF 6pm to 6am  She is tolerating TF's at rehab   Continue Glucerna 1.2 Bon at 80ml/hr x 12 hrs with 125 ml free water flushes Q 4 hrs

## 2024-09-23 NOTE — TELEPHONE ENCOUNTER
Spoke with [facility] they did need updated orders. The last order was for Jevity and pt is now on Glucerna 1.2. Updated orders sent through [employer] for signature.

## 2024-09-23 NOTE — TELEPHONE ENCOUNTER
Patients GI provider:  Dr. ZELAYA    Number to return call: [phone]     Reason for call: Lisa from [facility] contacted office to speak with Niyah. Patient being discharged from hospital tomorrow and they put in incorrect orders for the formula she gets. [facility] does not have supplies. Lisa is requesting call back ASAP. Office: [phone]. Cell: [phone].

## 2024-09-23 NOTE — ASSESSMENT & PLAN NOTE
Hypotensive while inpatient briefly required ICU transfer with pressors  BP's tend to run low per caregivers and review of records  Started on Midodrine with improvement  Continue Midodrine 5 mg TID

## 2024-09-23 NOTE — PROGRESS NOTES
[address] [address]  [phone]  DISCHARGE SUMMARY  FACILITY: [facility]  Code 31    ADDENDUM: Patient was scheduled for discharge back to her group home, she developed fever with elevated heart rate from her baseline one day prior to her scheduled discharge and was sent to ED for evaluation.     NAME: Terrie Alicea  AGE: 64 y.o. SEX: female   CODE STATUS: CPR    DATE OF ADMISSION: 9/19/2024   DATE OF DISCHARGE: 9/24/2024  DISCHARGE DISPOSITION: Stable for discharge back to Group [place].       Reason for Admission: Patient was admitted to [facility] for rehabilitation after hospitalization for poor po intake and dysphagia.    Past Medical and Surgical History:   Past Medical History:   Diagnosis Date    Acute metabolic encephalopathy 12/11/2015    Adjustment disorder     Altered mental status 12/11/2015    Anemia     Bipolar 1 disorder (Formerly [facility])     Cerebral palsy (Formerly [facility])     Chronic hypernatremia 02/06/2016    Closed fracture of left hip (Formerly [facility]) 01/19/2016    Closed left hip fracture (Formerly [facility])     no surgery    Constipation     Dehydration 02/20/2016    Developmental delay     Diabetes mellitus (Formerly [facility])     Difficulty walking     Disease of thyroid gland     Diverticulosis     Dysphagia     Worsening    Fracture of multiple ribs of right side     Herpes zoster without complication 06/02/2021    Hip fracture (Formerly [facility]) 07/26/2015    left    Hyperlipidemia     Hypernatremia 12/28/2018    Hypotension     Impulse control disorder     Incontinence     Intellectual disability due to developmental disorder, unspecified     Microalbuminuria     Neuropathy in diabetes (Formerly [facility])     Osteopathia     Osteoporosis     Peripheral neuropathy     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Formerly [facility])     Thrombocytopenia (Formerly [facility]) 07/31/2015      Past Surgical History:   Procedure Laterality Date    ABDOMINAL SURGERY      COLECTOMY MIN      re-anastomosis 7/22/16    COLON SURGERY  1/31/16    COLONOSCOPY N/A  07/21/2016    Procedure: COLONOSCOPY;  Surgeon: Rosalino Jacome MD;  Location: [facility];  Service:     COLONOSCOPY N/A 01/31/2016    Procedure: COLONOSCOPY;  Surgeon: Rosalino Jacome MD;  Location[facility];  Service:     COLONOSCOPY N/A 07/25/2017    Procedure: COLONOSCOPY;  Surgeon: Rosalino Jacome MD;  Location: [facility];  Service: Colorectal    COLOSTOMY      EXPLORATORY LAPAROTOMY W/ BOWEL RESECTION N/A 01/31/2016    Procedure: exploratory laparotomy, left sigmoidectomy, coloproctostomy, take down splenic flexure, loop colostomy;  Surgeon: Rosalino Jacome MD;  Location[facility];  Service:     GASTROSTOMY TUBE PLACEMENT N/A 8/26/2024    Procedure: INSERTION GASTROSTOMY TUBE OPEN;  Surgeon: Monroe Jean DO;  Location: [facility];  Service: General    IN CLOSURE ENTEROSTOMY LG/SMALL INTESTINE N/A 07/22/2016    Procedure: SEGMENTAL COLECTOMY WITH COLOCOLOSTOMY;  Surgeon: Rsoalino Jacome MD;  Location: [facility];  Service: Colorectal    UPPER GASTROINTESTINAL ENDOSCOPY         Course of stay:   Terrie Alicea is a 64 y.o. female admitted to [facility] following hospital stay for poor po intake and dysphagia. Patient has a past medical Hx including but not limited to Hypothyroidism, DM, Parkinson's Disease, Spastic Quadriplegic Cerebral Palsy, Bipolar disorder. Patient is seen in collaboration with nursing for medical mgmt and Discharge visit.     [facility] - 9/19/2024 (26 days) due to concerns for poor p.o. intake with fever and altered mental status. She presented from her group home where she resides.  She was diagnosed with a UTI and pneumonia and completed a course of IV antibiotics.  Given the issues with poor oral intake and dysphagia, a PEG tube was placed on 8/26/2024 and patient was started on tube feeds.  Hospitalization was complicated by hypotensive episode and required management in the ICU.   Patient was briefly on vasopressor support but did recover.  Once tube feeds could be reliably given patient had issues with hypo and hyperglycemia.  Endocrinology was consulted to assist with management and patient was monitored on a updated insulin regimen with improvement in BG control.     Seen and examined at bedside today. Patient is a poor historian due to Cerebral Palsy. She opens eye to touch. Nonverbal, wheelchair bound. She does not appear in any distress. Nursing have no concerns at this time. She is tolerating tube feeds. She is medically stable to return to her group home.                       ROS:  Review of Systems   Unable to perform ROS: Patient nonverbal       PHYSICAL EXAM:  VITALS:   Vitals:    09/23/24 1413   BP: 100/66   Pulse: 72   Resp: 18   Temp: 97.8 °F (36.6 °C)   SpO2: 97%        Physical Exam  Vitals and nursing note reviewed.   Constitutional:       General: She is not in acute distress.     Appearance: Normal appearance. She is ill-appearing. She is not toxic-appearing or diaphoretic.      Comments: Frail , cachectic    HENT:      Head: Normocephalic and atraumatic.      Nose: No congestion or rhinorrhea.      Mouth/Throat:      Mouth: Mucous membranes are moist.   Eyes:      General: No scleral icterus.     Conjunctiva/sclera: Conjunctivae normal.      Pupils: Pupils are equal, round, and reactive to light.   Cardiovascular:      Rate and Rhythm: Normal rate and regular rhythm.      Pulses: Normal pulses.      Heart sounds: Normal heart sounds. No murmur heard.  Pulmonary:      Effort: Pulmonary effort is normal. No respiratory distress.      Breath sounds: Normal breath sounds. No wheezing, rhonchi or rales.   Abdominal:      General: Bowel sounds are normal. There is no distension.      Palpations: Abdomen is soft. There is no mass.      Tenderness: There is no abdominal tenderness.      Hernia: No hernia is present.   Musculoskeletal:         General: No swelling or tenderness.       Comments: Contractions to all four extremities    Lymphadenopathy:      Cervical: No cervical adenopathy.   Skin:     General: Skin is warm and dry.      Coloration: Skin is not pale.      Findings: No rash.   Neurological:      General: No focal deficit present.      Mental Status: She is alert. Mental status is at baseline. She is disoriented.      GCS: GCS eye subscore is 3. GCS verbal subscore is 1. GCS motor subscore is 5.      Motor: Weakness present.      Gait: Gait abnormal.   Psychiatric:         Mood and Affect: Mood normal.         Behavior: Behavior normal.         Admission Diagnoses:   1. Diabetes mellitus type 2, insulin dependent (HCC)  Assessment & Plan:    Lab Results   Component Value Date    HGBA1C 6.2 (H) 08/13/2024     Good A1c control   Was having hyperglycemia after starting TF's  Jevity switched to Glucerna   Endocrinology consulted- continue following regimen upon d/c to group home  Regular insulin 4 units q6h  Regular insulin SSI AG 1  ?   Blood Glucose 150 - 224: 1 Unit of Insulin  ?   Blood Glucose 225 - 299: 2 Units of Insulin  ?   Blood Glucose 300 - 374: 3 Units of Insulin  ?   Blood Glucose 375 - 449: 4 Units of Insulin  ?   Blood Glucose greater than or equal to 450: 5 Units of Insulin  Continue Glucerna 1.2 Tube feeds 6p-6a  Follow-up with endocrinology in the outpatient setting  2. Spastic quadriplegic cerebral palsy (HCC)  Assessment & Plan:  At baseline is non verbal , totally dependent on care, resides in group [facility]   Continue baclofen 10 mg TID for spacticity   3. Oropharyngeal dysphagia  Assessment & Plan:  S/p PEG tube placement 8/26/2024  Lives in group home and attends a day program and needs feeding overnight  Continue TF 6pm to 6am  She is tolerating TF's at rehab   Continue Glucerna 1.2 Bon at 80ml/hr x 12 hrs with 125 ml free water flushes Q 4 hrs  4. Hypotension, unspecified hypotension type  Assessment & Plan:  Hypotensive while inpatient briefly required ICU  transfer with pressors  BP's tend to run low per caregivers and review of records  Started on Midodrine with improvement  Continue Midodrine 5 mg TID  5. Diabetes mellitus type 2, noninsulin dependent (HCC)  -     insulin regular (HumuLIN R,NovoLIN R) 100 units/mL injection; Inject 1-5 Units under the skin every 6 (six) hours if 0 - 149 = 0; 150 - 224 = 1 unit; 225 - 299 = 2 unit; 300 - 374 = 3 unit; 375 - 449 = 4 unit; 450+ = 5 unit call MD for bs less than 60 or greater than 450,  6. Type 2 diabetes mellitus without complication, with long-term current use of insulin (HCC)       Follow-up Recommendations:    Outpatient Follow up with PCP in the next 2 weeks  Home Health PT/OT/SN services     Labs and testing performed during stay:    Reviewed in chart    Discharge Medications: See discharge medication list which was reviewed and signed.      Current Outpatient Medications:     Alcohol Swabs 70 % PADS, May substitute brand based on insurance coverage. Check glucose TID., Disp: 100 each, Rfl: 0    ARIPiprazole (ABILIFY) 2 mg tablet, 1 tablet by Per G Tube route daily at bedtime, Disp: , Rfl:     Ascorbic Acid (vitamin C) 100 MG tablet, Take 100 mg by mouth daily, Disp: , Rfl:     baclofen 10 mg tablet, 1 tablet (10 mg total) by Per G Tube route 3 (three) times a day, Disp: 84 tablet, Rfl: 0    calcium carbonate (OS-MORRIS) 600 MG tablet, 1 tablet (600 mg total) by Per G Tube route 2 (two) times a day with meals, Disp: 60 tablet, Rfl: 0    carbidopa-levodopa (SINEMET CR)  mg TBCR per ER tablet, TAKE 2 TABLETS BY MOUTH (50/200MG) TWICE A DAY (PARKINSONS DISEASE), Disp: 112 tablet, Rfl: 10    cholecalciferol (VITAMIN D3) 1,000 units tablet, 1 tablet (1,000 Units total) by Per G Tube route daily, Disp: 90 tablet, Rfl: 0    citalopram (CeleXA) 40 mg tablet, 0.5 tablets by Per G Tube route daily 20 mg daily, Disp: , Rfl:     Ferrous Sulfate 300 (60 Fe) mg/5 mL solution, Take 5 ml by PEG tube every other day., Disp: 30  mL, Rfl: 0    glucose 4 g chewable tablet, Chew 4 tablets (16 g total) as needed for low blood sugar for up to 15 doses, Disp: 32 tablet, Rfl: 0    glucose blood (OneTouch Verio) test strip, May substitute brand based on insurance coverage. Check glucose TID., Disp: 100 each, Rfl: 0    guaiFENesin (DIABETIC TUSSIN EX) 100 MG/5ML oral liquid, 10 mL (200 mg total) by Per G Tube route 4 (four) times a day as needed for cough, Disp: 118 mL, Rfl: 0    hydrocortisone 1 % cream, Apply topically to affected area twice daily as needed for rash, Disp: 30 g, Rfl: 0    Insulin Pen Needle (BD Pen Needle Yue 2nd Gen) 32G X 4 MM MISC, For use with insulin pen. Pharmacy may dispense brand covered by insurance., Disp: 100 each, Rfl: 0    Insulin Regular Human (NovoLIN R FlexPen ReliOn) 100 UNIT/ML SOPN, Inject 4 Units under the skin every 6 (six) hours, Disp: 15 mL, Rfl: 0    levothyroxine 25 mcg tablet, 1 tablet (25 mcg total) by Per G Tube route every morning TAKE 1 TABLET BY MOUTH 1 HOUR BEFORE STARTING TUBE FEEDS (HYPOTHYROIDISM), Disp: 28 tablet, Rfl: 0    LORazepam (Ativan) 0.5 mg tablet, Take 1 tablet (0.5 mg total) by mouth daily at bedtime for 10 days, Disp: 10 tablet, Rfl: 0    methenamine hippurate (HIPREX) 1 g tablet, Crush 1 tablet two times a day with meals, Disp: 60 tablet, Rfl: 3    midodrine (PROAMATINE) 5 mg tablet, Take 1 tablet (5 mg total) by mouth 3 (three) times a day before meals, Disp: 90 tablet, Rfl: 0    neomycin-bacitracin-polymyxin b (NEOSPORIN) ointment, Apply 1 application topically 2 (two) times a day as needed Apply to affected area BID PRN, Disp: , Rfl:     olopatadine HCl (PATADAY) 0.2 % opth drops, Administer 1 drop to both eyes as needed Instill 1 drop into affected eyes daily PRN for eye redness, Disp: , Rfl:     OneTouch Delica Lancets 33G MISC, May substitute brand based on insurance coverage. Check glucose TID., Disp: 100 each, Rfl: 0    oxybutynin (DITROPAN) 5 mg tablet, 1 tablet (5 mg  "total) by Per G Tube route in the morning, Disp: , Rfl:     polyethylene glycol (GLYCOLAX) 17 GM/SCOOP powder, 17 g by Per G Tube route daily, Disp: 510 g, Rfl: 0    Refresh Liquigel 1 % GEL, 2 (two) times a day 1 drop Bid bilaterally, Disp: , Rfl:     simvastatin (ZOCOR) 20 mg tablet, 1 tablet (20 mg total) by Per G Tube route daily at bedtime, Disp: 28 tablet, Rfl: 0    Starch, Thickening, POWD, Take by mouth daily Use on food and liquid as directed, Disp: 1020 g, Rfl: 3    traZODone (DESYREL) 100 mg tablet, Take 100 mg by mouth daily at bedtime, Disp: , Rfl:     valproic acid (DEPAKENE) 250 MG/5ML soln, Take 2.5 mL (125 mg total) by mouth every 12 (twelve) hours, Disp: 150 mL, Rfl: 0    Vitamins A & D (VITAMIN A & D) ointment, Apply topically as needed for skin irritation, Disp: , Rfl:     Blood Glucose Monitoring Suppl (OneTouch Verio Reflect) w/Device KIT, May substitute brand based on insurance coverage. Check glucose TID., Disp: 1 kit, Rfl: 0     Discussion with patient/family and further instructions:  -Fall precautions  -Aspiration precautions  -Bleeding precautions  -Monitor for signs/symptoms of infection  -Medication list was reviewed and signed  -DME form was completed    Status at time of discharge: Stable      Billing based on time. Time spent on unit, 40 minutes. Time spent counseling pt on debility/condition, 30 minutes.      Please note:  Voice-recognition software may have been used in the preparation of this document.  Occasional wrong word or \"sound-alike\" substitutions may have occurred due to the inherent limitations of voice recognition software.  Interpretation should be guided by context.        LEATHA Fernandez  9/23/2024   "

## 2024-09-23 NOTE — TELEPHONE ENCOUNTER
Spoke with Lisa pt caregiver she has not received the needed tube feeding supplies. I told her I would contact [employer] and find out what happened to the order. She is agreeable to this. Pt is still in care facility anticipated DC is tomorrow.

## 2024-09-23 NOTE — ASSESSMENT & PLAN NOTE
Lab Results   Component Value Date    HGBA1C 6.2 (H) 08/13/2024     Good A1c control   Was having hyperglycemia after starting TF's  Jevity switched to Glucerna   Endocrinology consulted- continue following regimen upon d/c to group home  Regular insulin 4 units q6h  Regular insulin SSI AG 1  ?   Blood Glucose 150 - 224: 1 Unit of Insulin  ?   Blood Glucose 225 - 299: 2 Units of Insulin  ?   Blood Glucose 300 - 374: 3 Units of Insulin  ?   Blood Glucose 375 - 449: 4 Units of Insulin  ?   Blood Glucose greater than or equal to 450: 5 Units of Insulin  Continue Glucerna 1.2 Tube feeds 6p-6a  Follow-up with endocrinology in the outpatient setting

## 2024-09-24 ENCOUNTER — TELEPHONE (OUTPATIENT)
Age: 64
End: 2024-09-24

## 2024-09-24 NOTE — TELEPHONE ENCOUNTER
Nursing staff from patient's residential facility called with questions about a postop visit with Dr. Jean. Made a warm transfer to Irina in his office.

## 2024-09-25 ENCOUNTER — HOSPITAL ENCOUNTER (INPATIENT)
Facility: HOSPITAL | Age: 64
LOS: 6 days | Discharge: HOME | DRG: 871 | End: 2024-10-01
Attending: EMERGENCY MEDICINE | Admitting: HOSPITALIST
Payer: MEDICARE

## 2024-09-25 ENCOUNTER — APPOINTMENT (EMERGENCY)
Dept: RADIOLOGY | Facility: HOSPITAL | Age: 64
DRG: 871 | End: 2024-09-25
Payer: MEDICARE

## 2024-09-25 ENCOUNTER — TELEPHONE (OUTPATIENT)
Dept: SURGERY | Facility: CLINIC | Age: 64
End: 2024-09-25

## 2024-09-25 ENCOUNTER — TELEPHONE (OUTPATIENT)
Dept: OTHER | Facility: OTHER | Age: 64
End: 2024-09-25

## 2024-09-25 DIAGNOSIS — B34.9 VIRAL ILLNESS: ICD-10-CM

## 2024-09-25 DIAGNOSIS — A41.9 SEVERE SEPSIS (HCC): Primary | ICD-10-CM

## 2024-09-25 DIAGNOSIS — R32 URINARY INCONTINENCE, UNSPECIFIED TYPE: ICD-10-CM

## 2024-09-25 DIAGNOSIS — Z79.4 DIABETES MELLITUS TYPE 2, INSULIN DEPENDENT (HCC): ICD-10-CM

## 2024-09-25 DIAGNOSIS — E03.9 HYPOTHYROIDISM, UNSPECIFIED TYPE: ICD-10-CM

## 2024-09-25 DIAGNOSIS — G82.50 SPASTIC QUADRIPARESIS (HCC): ICD-10-CM

## 2024-09-25 DIAGNOSIS — I95.9 HYPOTENSION, UNSPECIFIED HYPOTENSION TYPE: ICD-10-CM

## 2024-09-25 DIAGNOSIS — N39.0 RECURRENT UTI: ICD-10-CM

## 2024-09-25 DIAGNOSIS — E11.9 DIABETES MELLITUS TYPE 2, INSULIN DEPENDENT (HCC): ICD-10-CM

## 2024-09-25 DIAGNOSIS — K21.00 GASTROESOPHAGEAL REFLUX DISEASE WITH ESOPHAGITIS WITHOUT HEMORRHAGE: ICD-10-CM

## 2024-09-25 DIAGNOSIS — E78.5 HYPERLIPIDEMIA, UNSPECIFIED HYPERLIPIDEMIA TYPE: ICD-10-CM

## 2024-09-25 DIAGNOSIS — R65.20 SEVERE SEPSIS (HCC): Primary | ICD-10-CM

## 2024-09-25 DIAGNOSIS — E43 SEVERE PROTEIN-CALORIE MALNUTRITION (HCC): ICD-10-CM

## 2024-09-25 DIAGNOSIS — F31.9 BIPOLAR DISORDER (HCC): ICD-10-CM

## 2024-09-25 DIAGNOSIS — E46 PROTEIN-CALORIE MALNUTRITION, UNSPECIFIED SEVERITY (HCC): ICD-10-CM

## 2024-09-25 DIAGNOSIS — G80.3 DYSTONIC CEREBRAL PALSY (HCC): ICD-10-CM

## 2024-09-25 DIAGNOSIS — R45.86 MOOD DISTURBANCE: ICD-10-CM

## 2024-09-25 DIAGNOSIS — E11.9 DIABETES MELLITUS TYPE 2, NONINSULIN DEPENDENT (HCC): ICD-10-CM

## 2024-09-25 DIAGNOSIS — M81.0 OSTEOPOROSIS, UNSPECIFIED OSTEOPOROSIS TYPE, UNSPECIFIED PATHOLOGICAL FRACTURE PRESENCE: ICD-10-CM

## 2024-09-25 LAB
ALBUMIN SERPL BCG-MCNC: 3.9 G/DL (ref 3.5–5)
ALP SERPL-CCNC: 45 U/L (ref 34–104)
ALT SERPL W P-5'-P-CCNC: 22 U/L (ref 7–52)
ANION GAP SERPL CALCULATED.3IONS-SCNC: 7 MMOL/L (ref 4–13)
APTT PPP: 22 SECONDS (ref 23–34)
AST SERPL W P-5'-P-CCNC: 23 U/L (ref 13–39)
BASOPHILS # BLD AUTO: 0.06 THOUSANDS/ΜL (ref 0–0.1)
BASOPHILS NFR BLD AUTO: 1 % (ref 0–1)
BILIRUB SERPL-MCNC: 0.43 MG/DL (ref 0.2–1)
BILIRUB UR QL STRIP: NEGATIVE
BUN SERPL-MCNC: 34 MG/DL (ref 5–25)
CALCIUM SERPL-MCNC: 10.5 MG/DL (ref 8.4–10.2)
CARDIAC TROPONIN I PNL SERPL HS: 5 NG/L
CHLORIDE SERPL-SCNC: 97 MMOL/L (ref 96–108)
CLARITY UR: ABNORMAL
CO2 SERPL-SCNC: 35 MMOL/L (ref 21–32)
COLOR UR: ABNORMAL
CREAT SERPL-MCNC: 1.14 MG/DL (ref 0.6–1.3)
EOSINOPHIL # BLD AUTO: 0.07 THOUSAND/ΜL (ref 0–0.61)
EOSINOPHIL NFR BLD AUTO: 1 % (ref 0–6)
ERYTHROCYTE [DISTWIDTH] IN BLOOD BY AUTOMATED COUNT: 13.2 % (ref 11.6–15.1)
GFR SERPL CREATININE-BSD FRML MDRD: 51 ML/MIN/1.73SQ M
GLUCOSE SERPL-MCNC: 178 MG/DL (ref 65–140)
GLUCOSE UR STRIP-MCNC: ABNORMAL MG/DL
HCT VFR BLD AUTO: 32.8 % (ref 34.8–46.1)
HGB BLD-MCNC: 10.6 G/DL (ref 11.5–15.4)
HGB UR QL STRIP.AUTO: NEGATIVE
IMM GRANULOCYTES # BLD AUTO: 0.02 THOUSAND/UL (ref 0–0.2)
IMM GRANULOCYTES NFR BLD AUTO: 0 % (ref 0–2)
INR PPP: 0.92 (ref 0.85–1.19)
KETONES UR STRIP-MCNC: NEGATIVE MG/DL
LACTATE SERPL-SCNC: 2.3 MMOL/L (ref 0.5–2)
LEUKOCYTE ESTERASE UR QL STRIP: NEGATIVE
LYMPHOCYTES # BLD AUTO: 2.01 THOUSANDS/ΜL (ref 0.6–4.47)
LYMPHOCYTES NFR BLD AUTO: 26 % (ref 14–44)
MCH RBC QN AUTO: 33.1 PG (ref 26.8–34.3)
MCHC RBC AUTO-ENTMCNC: 32.3 G/DL (ref 31.4–37.4)
MCV RBC AUTO: 103 FL (ref 82–98)
MONOCYTES # BLD AUTO: 0.72 THOUSAND/ΜL (ref 0.17–1.22)
MONOCYTES NFR BLD AUTO: 9 % (ref 4–12)
NEUTROPHILS # BLD AUTO: 4.89 THOUSANDS/ΜL (ref 1.85–7.62)
NEUTS SEG NFR BLD AUTO: 63 % (ref 43–75)
NITRITE UR QL STRIP: NEGATIVE
NRBC BLD AUTO-RTO: 0 /100 WBCS
PH UR STRIP.AUTO: 7.5 [PH]
PLATELET # BLD AUTO: 196 THOUSANDS/UL (ref 149–390)
PMV BLD AUTO: 12.8 FL (ref 8.9–12.7)
POTASSIUM SERPL-SCNC: 4.3 MMOL/L (ref 3.5–5.3)
PROCALCITONIN SERPL-MCNC: 0.09 NG/ML
PROT SERPL-MCNC: 7.2 G/DL (ref 6.4–8.4)
PROT UR STRIP-MCNC: NEGATIVE MG/DL
PROTHROMBIN TIME: 13.1 SECONDS (ref 12.3–15)
RBC # BLD AUTO: 3.2 MILLION/UL (ref 3.81–5.12)
SODIUM SERPL-SCNC: 139 MMOL/L (ref 135–147)
SP GR UR STRIP.AUTO: 1.02 (ref 1–1.03)
UROBILINOGEN UR STRIP-ACNC: <2 MG/DL
WBC # BLD AUTO: 7.77 THOUSAND/UL (ref 4.31–10.16)

## 2024-09-25 PROCEDURE — 84145 PROCALCITONIN (PCT): CPT | Performed by: EMERGENCY MEDICINE

## 2024-09-25 PROCEDURE — 99285 EMERGENCY DEPT VISIT HI MDM: CPT

## 2024-09-25 PROCEDURE — 71045 X-RAY EXAM CHEST 1 VIEW: CPT

## 2024-09-25 PROCEDURE — 85610 PROTHROMBIN TIME: CPT | Performed by: EMERGENCY MEDICINE

## 2024-09-25 PROCEDURE — 83605 ASSAY OF LACTIC ACID: CPT | Performed by: EMERGENCY MEDICINE

## 2024-09-25 PROCEDURE — 87040 BLOOD CULTURE FOR BACTERIA: CPT | Performed by: EMERGENCY MEDICINE

## 2024-09-25 PROCEDURE — 85730 THROMBOPLASTIN TIME PARTIAL: CPT | Performed by: EMERGENCY MEDICINE

## 2024-09-25 PROCEDURE — 84484 ASSAY OF TROPONIN QUANT: CPT | Performed by: EMERGENCY MEDICINE

## 2024-09-25 PROCEDURE — 36415 COLL VENOUS BLD VENIPUNCTURE: CPT | Performed by: EMERGENCY MEDICINE

## 2024-09-25 PROCEDURE — 96367 TX/PROPH/DG ADDL SEQ IV INF: CPT

## 2024-09-25 PROCEDURE — 80053 COMPREHEN METABOLIC PANEL: CPT | Performed by: EMERGENCY MEDICINE

## 2024-09-25 PROCEDURE — 85025 COMPLETE CBC W/AUTO DIFF WBC: CPT | Performed by: EMERGENCY MEDICINE

## 2024-09-25 PROCEDURE — 93005 ELECTROCARDIOGRAM TRACING: CPT

## 2024-09-25 PROCEDURE — 99291 CRITICAL CARE FIRST HOUR: CPT | Performed by: EMERGENCY MEDICINE

## 2024-09-25 PROCEDURE — 96365 THER/PROPH/DIAG IV INF INIT: CPT

## 2024-09-25 RX ORDER — VANCOMYCIN HYDROCHLORIDE 1 G/200ML
25 INJECTION, SOLUTION INTRAVENOUS ONCE
Status: COMPLETED | OUTPATIENT
Start: 2024-09-25 | End: 2024-09-26

## 2024-09-25 RX ORDER — SODIUM CHLORIDE, SODIUM GLUCONATE, SODIUM ACETATE, POTASSIUM CHLORIDE, MAGNESIUM CHLORIDE, SODIUM PHOSPHATE, DIBASIC, AND POTASSIUM PHOSPHATE .53; .5; .37; .037; .03; .012; .00082 G/100ML; G/100ML; G/100ML; G/100ML; G/100ML; G/100ML; G/100ML
500 INJECTION, SOLUTION INTRAVENOUS ONCE
Status: COMPLETED | OUTPATIENT
Start: 2024-09-25 | End: 2024-09-25

## 2024-09-25 RX ADMIN — VANCOMYCIN HYDROCHLORIDE 1000 MG: 1 INJECTION, SOLUTION INTRAVENOUS at 23:21

## 2024-09-25 RX ADMIN — CEFEPIME 2000 MG: 2 INJECTION, POWDER, FOR SOLUTION INTRAVENOUS at 22:54

## 2024-09-25 RX ADMIN — SODIUM CHLORIDE, SODIUM GLUCONATE, SODIUM ACETATE, POTASSIUM CHLORIDE, MAGNESIUM CHLORIDE, SODIUM PHOSPHATE, DIBASIC, AND POTASSIUM PHOSPHATE 500 ML: .53; .5; .37; .037; .03; .012; .00082 INJECTION, SOLUTION INTRAVENOUS at 22:45

## 2024-09-25 NOTE — TELEPHONE ENCOUNTER
Spoke with Lisa regarding Terrie.  [facility] [phone]  Fax [phone].  Order faxed for suture removal.  Terrie does not need a post op appt with Dr Jean unless she is having a problem with her incision or feeding tube.

## 2024-09-26 ENCOUNTER — APPOINTMENT (INPATIENT)
Dept: CT IMAGING | Facility: HOSPITAL | Age: 64
DRG: 871 | End: 2024-09-26
Payer: MEDICARE

## 2024-09-26 PROBLEM — R45.86 MOOD DISTURBANCE: Status: ACTIVE | Noted: 2024-09-26

## 2024-09-26 LAB
2HR DELTA HS TROPONIN: 0 NG/L
4HR DELTA HS TROPONIN: 0 NG/L
ANION GAP SERPL CALCULATED.3IONS-SCNC: 10 MMOL/L (ref 4–13)
ANION GAP SERPL CALCULATED.3IONS-SCNC: 7 MMOL/L (ref 4–13)
ATRIAL RATE: 104 BPM
BASE EX.OXY STD BLDV CALC-SCNC: 58.6 % (ref 60–80)
BASE EXCESS BLDV CALC-SCNC: 2.6 MMOL/L
BASOPHILS # BLD AUTO: 0.05 THOUSANDS/ΜL (ref 0–0.1)
BASOPHILS NFR BLD AUTO: 1 % (ref 0–1)
BUN SERPL-MCNC: 27 MG/DL (ref 5–25)
BUN SERPL-MCNC: 30 MG/DL (ref 5–25)
CALCIUM SERPL-MCNC: 9 MG/DL (ref 8.4–10.2)
CALCIUM SERPL-MCNC: 9.8 MG/DL (ref 8.4–10.2)
CARDIAC TROPONIN I PNL SERPL HS: 5 NG/L
CARDIAC TROPONIN I PNL SERPL HS: 5 NG/L
CHLORIDE SERPL-SCNC: 98 MMOL/L (ref 96–108)
CHLORIDE SERPL-SCNC: 99 MMOL/L (ref 96–108)
CO2 SERPL-SCNC: 28 MMOL/L (ref 21–32)
CO2 SERPL-SCNC: 33 MMOL/L (ref 21–32)
CREAT SERPL-MCNC: 0.99 MG/DL (ref 0.6–1.3)
CREAT SERPL-MCNC: 1.03 MG/DL (ref 0.6–1.3)
EOSINOPHIL # BLD AUTO: 0.09 THOUSAND/ΜL (ref 0–0.61)
EOSINOPHIL NFR BLD AUTO: 1 % (ref 0–6)
ERYTHROCYTE [DISTWIDTH] IN BLOOD BY AUTOMATED COUNT: 13.3 % (ref 11.6–15.1)
ERYTHROCYTE [DISTWIDTH] IN BLOOD BY AUTOMATED COUNT: 13.4 % (ref 11.6–15.1)
FLUAV RNA RESP QL NAA+PROBE: NEGATIVE
FLUBV RNA RESP QL NAA+PROBE: NEGATIVE
GFR SERPL CREATININE-BSD FRML MDRD: 58 ML/MIN/1.73SQ M
GFR SERPL CREATININE-BSD FRML MDRD: 61 ML/MIN/1.73SQ M
GLUCOSE SERPL-MCNC: 128 MG/DL (ref 65–140)
GLUCOSE SERPL-MCNC: 167 MG/DL (ref 65–140)
GLUCOSE SERPL-MCNC: 168 MG/DL (ref 65–140)
GLUCOSE SERPL-MCNC: 191 MG/DL (ref 65–140)
GLUCOSE SERPL-MCNC: 191 MG/DL (ref 65–140)
GLUCOSE SERPL-MCNC: 222 MG/DL (ref 65–140)
GLUCOSE SERPL-MCNC: 281 MG/DL (ref 65–140)
GLUCOSE SERPL-MCNC: 309 MG/DL (ref 65–140)
GLUCOSE SERPL-MCNC: 326 MG/DL (ref 65–140)
GLUCOSE SERPL-MCNC: 59 MG/DL (ref 65–140)
GLUCOSE SERPL-MCNC: 77 MG/DL (ref 65–140)
HCO3 BLDV-SCNC: 27.8 MMOL/L (ref 24–30)
HCT VFR BLD AUTO: 28.5 % (ref 34.8–46.1)
HCT VFR BLD AUTO: 30.8 % (ref 34.8–46.1)
HGB BLD-MCNC: 9.3 G/DL (ref 11.5–15.4)
HGB BLD-MCNC: 9.9 G/DL (ref 11.5–15.4)
IMM GRANULOCYTES # BLD AUTO: 0.05 THOUSAND/UL (ref 0–0.2)
IMM GRANULOCYTES NFR BLD AUTO: 1 % (ref 0–2)
LACTATE SERPL-SCNC: 1 MMOL/L (ref 0.5–2)
LACTATE SERPL-SCNC: 1.5 MMOL/L (ref 0.5–2)
LYMPHOCYTES # BLD AUTO: 2.26 THOUSANDS/ΜL (ref 0.6–4.47)
LYMPHOCYTES NFR BLD AUTO: 22 % (ref 14–44)
MCH RBC QN AUTO: 32.7 PG (ref 26.8–34.3)
MCH RBC QN AUTO: 33.6 PG (ref 26.8–34.3)
MCHC RBC AUTO-ENTMCNC: 32.1 G/DL (ref 31.4–37.4)
MCHC RBC AUTO-ENTMCNC: 32.6 G/DL (ref 31.4–37.4)
MCV RBC AUTO: 102 FL (ref 82–98)
MCV RBC AUTO: 103 FL (ref 82–98)
MONOCYTES # BLD AUTO: 0.75 THOUSAND/ΜL (ref 0.17–1.22)
MONOCYTES NFR BLD AUTO: 7 % (ref 4–12)
NEUTROPHILS # BLD AUTO: 7.06 THOUSANDS/ΜL (ref 1.85–7.62)
NEUTS SEG NFR BLD AUTO: 68 % (ref 43–75)
NRBC BLD AUTO-RTO: 0 /100 WBCS
O2 CT BLDV-SCNC: 7.8 ML/DL
PCO2 BLDV: 46.2 MM HG (ref 42–50)
PH BLDV: 7.4 [PH] (ref 7.3–7.4)
PLATELET # BLD AUTO: 172 THOUSANDS/UL (ref 149–390)
PLATELET # BLD AUTO: 177 THOUSANDS/UL (ref 149–390)
PMV BLD AUTO: 12.4 FL (ref 8.9–12.7)
PMV BLD AUTO: 12.4 FL (ref 8.9–12.7)
PO2 BLDV: 31 MM HG (ref 35–45)
POTASSIUM SERPL-SCNC: 4.2 MMOL/L (ref 3.5–5.3)
POTASSIUM SERPL-SCNC: 4.4 MMOL/L (ref 3.5–5.3)
PROCALCITONIN SERPL-MCNC: 0.08 NG/ML
QRS AXIS: 80 DEGREES
QRSD INTERVAL: 80 MS
QT INTERVAL: 350 MS
QTC INTERVAL: 460 MS
RBC # BLD AUTO: 2.77 MILLION/UL (ref 3.81–5.12)
RBC # BLD AUTO: 3.03 MILLION/UL (ref 3.81–5.12)
RSV RNA RESP QL NAA+PROBE: NEGATIVE
SARS-COV-2 RNA RESP QL NAA+PROBE: NEGATIVE
SODIUM SERPL-SCNC: 136 MMOL/L (ref 135–147)
SODIUM SERPL-SCNC: 139 MMOL/L (ref 135–147)
T WAVE AXIS: 53 DEGREES
VENTRICULAR RATE: 104 BPM
WBC # BLD AUTO: 10.26 THOUSAND/UL (ref 4.31–10.16)
WBC # BLD AUTO: 8.89 THOUSAND/UL (ref 4.31–10.16)

## 2024-09-26 PROCEDURE — 36415 COLL VENOUS BLD VENIPUNCTURE: CPT | Performed by: EMERGENCY MEDICINE

## 2024-09-26 PROCEDURE — 93010 ELECTROCARDIOGRAM REPORT: CPT | Performed by: INTERNAL MEDICINE

## 2024-09-26 PROCEDURE — 82805 BLOOD GASES W/O2 SATURATION: CPT

## 2024-09-26 PROCEDURE — 0241U HB NFCT DS VIR RESP RNA 4 TRGT: CPT

## 2024-09-26 PROCEDURE — 87081 CULTURE SCREEN ONLY: CPT | Performed by: HOSPITALIST

## 2024-09-26 PROCEDURE — 99223 1ST HOSP IP/OBS HIGH 75: CPT | Performed by: INTERNAL MEDICINE

## 2024-09-26 PROCEDURE — 85025 COMPLETE CBC W/AUTO DIFF WBC: CPT

## 2024-09-26 PROCEDURE — 80048 BASIC METABOLIC PNL TOTAL CA: CPT

## 2024-09-26 PROCEDURE — 83605 ASSAY OF LACTIC ACID: CPT

## 2024-09-26 PROCEDURE — 84484 ASSAY OF TROPONIN QUANT: CPT | Performed by: EMERGENCY MEDICINE

## 2024-09-26 PROCEDURE — 82948 REAGENT STRIP/BLOOD GLUCOSE: CPT

## 2024-09-26 PROCEDURE — 83605 ASSAY OF LACTIC ACID: CPT | Performed by: EMERGENCY MEDICINE

## 2024-09-26 PROCEDURE — 84145 PROCALCITONIN (PCT): CPT

## 2024-09-26 PROCEDURE — 85027 COMPLETE CBC AUTOMATED: CPT

## 2024-09-26 PROCEDURE — 70450 CT HEAD/BRAIN W/O DYE: CPT

## 2024-09-26 RX ORDER — KETOTIFEN FUMARATE 0.35 MG/ML
1 SOLUTION/ DROPS OPHTHALMIC 2 TIMES DAILY
Status: DISCONTINUED | OUTPATIENT
Start: 2024-09-26 | End: 2024-10-01 | Stop reason: HOSPADM

## 2024-09-26 RX ORDER — VALPROIC ACID 250 MG/5ML
125 SOLUTION ORAL EVERY 12 HOURS SCHEDULED
Status: DISCONTINUED | OUTPATIENT
Start: 2024-09-26 | End: 2024-10-01 | Stop reason: HOSPADM

## 2024-09-26 RX ORDER — CALCIUM CARBONATE 500 MG/1
500 TABLET, CHEWABLE ORAL 2 TIMES DAILY PRN
Status: DISCONTINUED | OUTPATIENT
Start: 2024-09-26 | End: 2024-10-01 | Stop reason: HOSPADM

## 2024-09-26 RX ORDER — ACETAMINOPHEN 325 MG/1
650 TABLET ORAL EVERY 6 HOURS PRN
Status: DISCONTINUED | OUTPATIENT
Start: 2024-09-26 | End: 2024-09-26

## 2024-09-26 RX ORDER — INSULIN LISPRO 100 [IU]/ML
1-5 INJECTION, SOLUTION INTRAVENOUS; SUBCUTANEOUS
Status: DISCONTINUED | OUTPATIENT
Start: 2024-09-26 | End: 2024-09-26

## 2024-09-26 RX ORDER — POLYETHYLENE GLYCOL 3350 17 G/17G
17 POWDER, FOR SOLUTION ORAL DAILY
Status: DISCONTINUED | OUTPATIENT
Start: 2024-09-26 | End: 2024-09-29

## 2024-09-26 RX ORDER — OXYBUTYNIN CHLORIDE 5 MG/1
5 TABLET ORAL DAILY
Status: DISCONTINUED | OUTPATIENT
Start: 2024-09-26 | End: 2024-10-01 | Stop reason: HOSPADM

## 2024-09-26 RX ORDER — ACETAMINOPHEN 325 MG/1
975 TABLET ORAL EVERY 6 HOURS PRN
Status: DISCONTINUED | OUTPATIENT
Start: 2024-09-26 | End: 2024-10-01 | Stop reason: HOSPADM

## 2024-09-26 RX ORDER — METRONIDAZOLE 500 MG/100ML
500 INJECTION, SOLUTION INTRAVENOUS EVERY 8 HOURS
Status: DISCONTINUED | OUTPATIENT
Start: 2024-09-26 | End: 2024-09-27

## 2024-09-26 RX ORDER — TRAZODONE HYDROCHLORIDE 100 MG/1
100 TABLET ORAL
Status: DISCONTINUED | OUTPATIENT
Start: 2024-09-26 | End: 2024-10-01 | Stop reason: HOSPADM

## 2024-09-26 RX ORDER — INSULIN LISPRO 100 [IU]/ML
4 INJECTION, SOLUTION INTRAVENOUS; SUBCUTANEOUS EVERY 6 HOURS
Status: DISCONTINUED | OUTPATIENT
Start: 2024-09-26 | End: 2024-09-26

## 2024-09-26 RX ORDER — ARIPIPRAZOLE 2 MG/1
2 TABLET ORAL
Status: DISCONTINUED | OUTPATIENT
Start: 2024-09-26 | End: 2024-10-01 | Stop reason: HOSPADM

## 2024-09-26 RX ORDER — MIDODRINE HYDROCHLORIDE 5 MG/1
5 TABLET ORAL
Status: DISCONTINUED | OUTPATIENT
Start: 2024-09-26 | End: 2024-10-01 | Stop reason: HOSPADM

## 2024-09-26 RX ORDER — CARBIDOPA AND LEVODOPA 25; 100 MG/1; MG/1
2 TABLET, EXTENDED RELEASE ORAL 2 TIMES DAILY
Status: DISCONTINUED | OUTPATIENT
Start: 2024-09-26 | End: 2024-09-28

## 2024-09-26 RX ORDER — ENOXAPARIN SODIUM 100 MG/ML
40 INJECTION SUBCUTANEOUS DAILY
Status: DISCONTINUED | OUTPATIENT
Start: 2024-09-26 | End: 2024-10-01 | Stop reason: HOSPADM

## 2024-09-26 RX ORDER — BACLOFEN 10 MG/1
10 TABLET ORAL 3 TIMES DAILY
Status: DISCONTINUED | OUTPATIENT
Start: 2024-09-26 | End: 2024-10-01 | Stop reason: HOSPADM

## 2024-09-26 RX ORDER — GUAIFENESIN 100 MG/5ML
200 SOLUTION ORAL 4 TIMES DAILY PRN
Status: DISCONTINUED | OUTPATIENT
Start: 2024-09-26 | End: 2024-10-01 | Stop reason: HOSPADM

## 2024-09-26 RX ORDER — MINERAL OIL AND PETROLATUM 150; 830 MG/G; MG/G
OINTMENT OPHTHALMIC
Status: DISCONTINUED | OUTPATIENT
Start: 2024-09-26 | End: 2024-10-01 | Stop reason: HOSPADM

## 2024-09-26 RX ORDER — SODIUM CHLORIDE, SODIUM GLUCONATE, SODIUM ACETATE, POTASSIUM CHLORIDE, MAGNESIUM CHLORIDE, SODIUM PHOSPHATE, DIBASIC, AND POTASSIUM PHOSPHATE .53; .5; .37; .037; .03; .012; .00082 G/100ML; G/100ML; G/100ML; G/100ML; G/100ML; G/100ML; G/100ML
100 INJECTION, SOLUTION INTRAVENOUS CONTINUOUS
Status: DISCONTINUED | OUTPATIENT
Start: 2024-09-26 | End: 2024-09-27

## 2024-09-26 RX ORDER — FERROUS SULFATE 300 MG/5ML
300 LIQUID (ML) ORAL EVERY OTHER DAY
Status: DISCONTINUED | OUTPATIENT
Start: 2024-09-26 | End: 2024-10-01 | Stop reason: HOSPADM

## 2024-09-26 RX ORDER — MULTIVIT WITH MINERALS/LUTEIN
125 TABLET ORAL DAILY
Status: DISCONTINUED | OUTPATIENT
Start: 2024-09-26 | End: 2024-10-01 | Stop reason: HOSPADM

## 2024-09-26 RX ORDER — PRAVASTATIN SODIUM 40 MG
40 TABLET ORAL
Status: DISCONTINUED | OUTPATIENT
Start: 2024-09-26 | End: 2024-10-01 | Stop reason: HOSPADM

## 2024-09-26 RX ORDER — LORAZEPAM 0.5 MG/1
0.5 TABLET ORAL
Status: DISCONTINUED | OUTPATIENT
Start: 2024-09-26 | End: 2024-10-01 | Stop reason: HOSPADM

## 2024-09-26 RX ORDER — INSULIN LISPRO 100 [IU]/ML
1-5 INJECTION, SOLUTION INTRAVENOUS; SUBCUTANEOUS EVERY 6 HOURS
Status: DISCONTINUED | OUTPATIENT
Start: 2024-09-26 | End: 2024-09-26

## 2024-09-26 RX ORDER — LEVOTHYROXINE SODIUM 25 UG/1
25 TABLET ORAL
Status: DISCONTINUED | OUTPATIENT
Start: 2024-09-26 | End: 2024-10-01 | Stop reason: HOSPADM

## 2024-09-26 RX ORDER — METHENAMINE HIPPURATE 1000 MG/1
1 TABLET ORAL 2 TIMES DAILY
Status: DISCONTINUED | OUTPATIENT
Start: 2024-09-26 | End: 2024-09-26

## 2024-09-26 RX ORDER — POLYETHYLENE GLYCOL 3350 17 G/17G
17 POWDER, FOR SOLUTION ORAL DAILY
Status: DISCONTINUED | OUTPATIENT
Start: 2024-09-26 | End: 2024-09-26 | Stop reason: SDUPTHER

## 2024-09-26 RX ORDER — CITALOPRAM HYDROBROMIDE 20 MG/1
20 TABLET ORAL DAILY
Status: DISCONTINUED | OUTPATIENT
Start: 2024-09-26 | End: 2024-10-01 | Stop reason: HOSPADM

## 2024-09-26 RX ADMIN — ACETAMINOPHEN 650 MG: 325 TABLET ORAL at 08:53

## 2024-09-26 RX ADMIN — MIDODRINE HYDROCHLORIDE 5 MG: 5 TABLET ORAL at 16:10

## 2024-09-26 RX ADMIN — METHENAMINE HIPPURATE 1 G: 1 TABLET ORAL at 08:57

## 2024-09-26 RX ADMIN — METRONIDAZOLE 500 MG: 500 INJECTION, SOLUTION INTRAVENOUS at 04:29

## 2024-09-26 RX ADMIN — POLYETHYLENE GLYCOL 3350 17 G: 17 POWDER, FOR SOLUTION ORAL at 08:53

## 2024-09-26 RX ADMIN — LEVOTHYROXINE SODIUM 25 MCG: 25 TABLET ORAL at 06:13

## 2024-09-26 RX ADMIN — BACLOFEN 10 MG: 10 TABLET ORAL at 08:53

## 2024-09-26 RX ADMIN — PRAVASTATIN SODIUM 40 MG: 40 TABLET ORAL at 16:10

## 2024-09-26 RX ADMIN — INSULIN LISPRO 3 UNITS: 100 INJECTION, SOLUTION INTRAVENOUS; SUBCUTANEOUS at 13:17

## 2024-09-26 RX ADMIN — ENOXAPARIN SODIUM 40 MG: 40 INJECTION SUBCUTANEOUS at 08:53

## 2024-09-26 RX ADMIN — LORAZEPAM 0.5 MG: 0.5 TABLET ORAL at 21:04

## 2024-09-26 RX ADMIN — KETOTIFEN FUMARATE 1 DROP: 0.25 SOLUTION OPHTHALMIC at 09:09

## 2024-09-26 RX ADMIN — METRONIDAZOLE 500 MG: 500 INJECTION, SOLUTION INTRAVENOUS at 12:59

## 2024-09-26 RX ADMIN — INSULIN HUMAN 4 UNITS: 100 INJECTION, SOLUTION PARENTERAL at 23:41

## 2024-09-26 RX ADMIN — METRONIDAZOLE 500 MG: 500 INJECTION, SOLUTION INTRAVENOUS at 21:04

## 2024-09-26 RX ADMIN — OXYBUTYNIN CHLORIDE 5 MG: 5 TABLET ORAL at 08:53

## 2024-09-26 RX ADMIN — INSULIN HUMAN 1 UNITS: 100 INJECTION, SOLUTION PARENTERAL at 21:17

## 2024-09-26 RX ADMIN — VALPROIC ACID 125 MG: 250 SOLUTION ORAL at 21:04

## 2024-09-26 RX ADMIN — ARIPIPRAZOLE 2 MG: 2 TABLET ORAL at 21:40

## 2024-09-26 RX ADMIN — MIDODRINE HYDROCHLORIDE 5 MG: 5 TABLET ORAL at 06:13

## 2024-09-26 RX ADMIN — INSULIN LISPRO 4 UNITS: 100 INJECTION, SOLUTION INTRAVENOUS; SUBCUTANEOUS at 09:56

## 2024-09-26 RX ADMIN — METHENAMINE HIPPURATE 1 G: 1 TABLET ORAL at 17:58

## 2024-09-26 RX ADMIN — CITALOPRAM HYDROBROMIDE 20 MG: 20 TABLET ORAL at 08:53

## 2024-09-26 RX ADMIN — TRAZODONE HYDROCHLORIDE 100 MG: 100 TABLET ORAL at 21:04

## 2024-09-26 RX ADMIN — SODIUM CHLORIDE, SODIUM GLUCONATE, SODIUM ACETATE, POTASSIUM CHLORIDE, MAGNESIUM CHLORIDE, SODIUM PHOSPHATE, DIBASIC, AND POTASSIUM PHOSPHATE 100 ML/HR: .53; .5; .37; .037; .03; .012; .00082 INJECTION, SOLUTION INTRAVENOUS at 23:27

## 2024-09-26 RX ADMIN — VALPROIC ACID 125 MG: 250 SOLUTION ORAL at 08:53

## 2024-09-26 RX ADMIN — ACETAMINOPHEN 325MG 975 MG: 325 TABLET ORAL at 16:10

## 2024-09-26 RX ADMIN — BACLOFEN 10 MG: 10 TABLET ORAL at 16:09

## 2024-09-26 RX ADMIN — KETOTIFEN FUMARATE 1 DROP: 0.25 SOLUTION OPHTHALMIC at 17:58

## 2024-09-26 RX ADMIN — MIDODRINE HYDROCHLORIDE 5 MG: 5 TABLET ORAL at 12:58

## 2024-09-26 RX ADMIN — INSULIN HUMAN 4 UNITS: 100 INJECTION, SOLUTION PARENTERAL at 13:13

## 2024-09-26 RX ADMIN — INSULIN LISPRO 4 UNITS: 100 INJECTION, SOLUTION INTRAVENOUS; SUBCUTANEOUS at 04:29

## 2024-09-26 RX ADMIN — SODIUM CHLORIDE, SODIUM GLUCONATE, SODIUM ACETATE, POTASSIUM CHLORIDE, MAGNESIUM CHLORIDE, SODIUM PHOSPHATE, DIBASIC, AND POTASSIUM PHOSPHATE 125 ML/HR: .53; .5; .37; .037; .03; .012; .00082 INJECTION, SOLUTION INTRAVENOUS at 13:04

## 2024-09-26 RX ADMIN — FERROUS SULFATE 300 MG: 300 SOLUTION ORAL at 08:53

## 2024-09-26 RX ADMIN — Medication 1000 UNITS: at 08:53

## 2024-09-26 RX ADMIN — Medication 125 MG: at 08:53

## 2024-09-26 RX ADMIN — CEFTRIAXONE SODIUM 1000 MG: 10 INJECTION, POWDER, FOR SOLUTION INTRAVENOUS at 06:13

## 2024-09-26 RX ADMIN — WHITE PETROLATUM 57.7 %-MINERAL OIL 31.9 % EYE OINTMENT: at 21:40

## 2024-09-26 RX ADMIN — BACLOFEN 10 MG: 10 TABLET ORAL at 21:04

## 2024-09-26 NOTE — ASSESSMENT & PLAN NOTE
Lab Results   Component Value Date    HGBA1C 6.2 (H) 08/13/2024       Recent Labs     09/26/24  0355   POCGLU 191*       Blood Sugar Average: Last 72 hrs:  (P) 191   Continue home lispro 4 units every 6 hours  Lispro sliding scale

## 2024-09-26 NOTE — ASSESSMENT & PLAN NOTE
-Patient was brought in by EMS from rehab facility for a fever, her fever was initially 100.9F, was given Tylenol at the facility which had than dropped to 100 F  - Met SIRS on admission with Temp 102 F.    -LA: 2.3    CXR: Pneumonia inferomedial right lung    Plan:  Initiate IV antibiotic therapy with ceftriaxone and Flagyl  Follow blood cultures  Follow MRSA culture

## 2024-09-26 NOTE — H&P
H&P - Hospitalist   Name: Terrie Alicea 64 y.o. female I MRN: [id number]  Unit/Bed#: W -01 I Date of Admission: 9/25/2024   Date of Service: 9/26/2024 I Hospital Day: 1     Assessment & Plan  Severe sepsis (HCC)  -Patient was brought in by EMS from rehab facility for a fever, her fever was initially 100.9F, was given Tylenol at the facility which had than dropped to 100 F  - Met SIRS on admission with Temp 102 F.    -LA: 2.3    CXR: Pneumonia inferomedial right lung    Plan:  Initiate IV antibiotic therapy with ceftriaxone and Flagyl  Follow blood cultures  Follow MRSA culture  Pneumonia  -She was recently admitted for aspiration pneumonia in August treated with ceftriaxone, and discharged on Vantin    CXR this admission: inferomedial right lung pneumonia     Plan:  See plan for sepsis   Diabetes mellitus type 2, insulin dependent (HCC)  Lab Results   Component Value Date    HGBA1C 6.2 (H) 08/13/2024       Recent Labs     09/26/24  0355   POCGLU 191*       Blood Sugar Average: Last 72 hrs:  (P) 191   Continue home lispro 4 units every 6 hours  Lispro sliding scale       Parkinson's disease (HCC)  Continue home Sinemet    Urinary incontinence  Continue home oxybutynin  Hypotension  Continue home midodrine   Hypothyroidism  Continue home levothyroxine   Mood disturbance  Continue home abilify, citalopram, and trazodone     VTE Pharmacologic Prophylaxis: VTE Score: 6 High Risk (Score >/= 5) - Pharmacological DVT Prophylaxis Ordered: enoxaparin (Lovenox). Sequential Compression Devices Ordered.  Code Status: Level 1 - Full Code   Discussion with family: Updated  (caregiver) via phone.    Anticipated Length of Stay: Patient will be admitted on an inpatient basis with an anticipated length of stay of greater than 2 midnights secondary to sepsis.    History of Present Illness   Chief Complaint: Fever    Terrie Alicea is a 64 y.o. female with a PMH of cerebral palsy, T2DM, parkinson's disease,  "and mood disorders who presents with fever.  She presented from rehab facility where caregiver had noticed she had a fever around 100.8 F, she was given Tylenol which reduced her fever to 100 F.  The wound care physician at the facility recommended she come to the hospital for further management.  Patient is nonverbal.  When examined at bedside patient appears to be in some distress with facial grimacing, and twitching.  According to facility staff this is not her baseline.    She was previously admitted for severe sepsis this past August and discharged on 9/19 , and was treated for aspiration pneumonia.  Per chart review she has had multiple infections in the past.    Review of Systems   Unable to perform ROS: Patient nonverbal       Patient's prior history (PMH, PSH, SH, FH, etc) not obtainable due to nonverbal status   Social History:  Marital Status: Single   Occupation: N/A  Patient Pre-hospital Living Situation: Skilled Nursing Facility: [facility]   Patient Pre-hospital Level of Mobility: non-ambulatory/bed bound  Patient Pre-hospital Diet Restrictions: Enteral feeding tube    Objective     Vitals:   Blood Pressure: 117/70 (09/26/24 0128)  Pulse: 84 (09/26/24 0128)  Temperature: 100.1 °F (37.8 °C) (09/26/24 0128)  Temp Source: Axillary (09/26/24 0128)  Respirations: 22 (09/26/24 0128)  Height: 4' 10\" (147.3 cm) (09/26/24 0128)  Weight - Scale: 44.6 kg (98 lb 5.2 oz) (09/26/24 0128)  SpO2: 98 % (09/26/24 0128)    Physical Exam  Constitutional:       General: She is in acute distress.      Appearance: She is ill-appearing.   HENT:      Mouth/Throat:      Mouth: Mucous membranes are moist.      Pharynx: Oropharynx is clear.   Cardiovascular:      Rate and Rhythm: Normal rate and regular rhythm.      Pulses: Normal pulses.      Heart sounds: Normal heart sounds. No murmur heard.  Pulmonary:      Effort: Pulmonary effort is normal.      Breath sounds: Rhonchi (R lower field) present.   Abdominal:      Palpations: " Abdomen is soft.      Tenderness: There is no abdominal tenderness.      Comments: PEG tube in place - could not assess due to patient contracture status at the time of exam   Musculoskeletal:      Right lower leg: No edema.      Left lower leg: No edema.   Neurological:      Comments: Contracture upper and lower extremities BL       Lines/Drains:  Lines/Drains/Airways       Active Status       Name Placement date Placement time Site Days    Gastrostomy/Enterostomy  16 Fr. LUQ 08/26/24  1643  LUQ  30    Urethral Catheter Latex 18 Fr. 09/25/24  2258  Latex  less than 1                  Urinary Catheter:  Goal for removal: Voiding trial when ambulation improves             Additional Data:   Lab Results: I have reviewed the following results:   Results from last 7 days   Lab Units 09/25/24  2226   WBC Thousand/uL 7.77   HEMOGLOBIN g/dL 10.6*   HEMATOCRIT % 32.8*   PLATELETS Thousands/uL 196   SEGS PCT % 63   LYMPHO PCT % 26   MONO PCT % 9   EOS PCT % 1     Results from last 7 days   Lab Units 09/25/24  2226   SODIUM mmol/L 139   POTASSIUM mmol/L 4.3   CHLORIDE mmol/L 97   CO2 mmol/L 35*   BUN mg/dL 34*   CREATININE mg/dL 1.14   ANION GAP mmol/L 7   CALCIUM mg/dL 10.5*   ALBUMIN g/dL 3.9   TOTAL BILIRUBIN mg/dL 0.43   ALK PHOS U/L 45   ALT U/L 22   AST U/L 23   GLUCOSE RANDOM mg/dL 178*     Results from last 7 days   Lab Units 09/25/24  2226   INR  0.92     Results from last 7 days   Lab Units 09/26/24  0355 09/19/24  1100 09/19/24  0551   POC GLUCOSE mg/dl 191* 100 169*     Lab Results   Component Value Date    HGBA1C 6.2 (H) 08/13/2024    HGBA1C 6.3 (H) 05/10/2024    HGBA1C 6.6 (H) 12/20/2023     Results from last 7 days   Lab Units 09/26/24  0201 09/25/24  2226   LACTIC ACID mmol/L 1.5 2.3*   PROCALCITONIN ng/ml  --  0.09       Imaging Review: Reviewed radiology reports from this admission including: chest xray.  Other Studies: EKG was reviewed.     Administrative Statements   I have spent a total time of 45 minutes  in caring for this patient on the day of the visit/encounter including Diagnostic results, Prognosis, Risks and benefits of tx options, Instructions for management, Patient and family education, Importance of tx compliance, Risk factor reductions, Impressions, Counseling / Coordination of care, Documenting in the medical record, Reviewing / ordering tests, medicine, procedures  , Obtaining or reviewing history  , and Communicating with other healthcare professionals .    ** Please Note: This note has been constructed using a voice recognition system. **

## 2024-09-26 NOTE — QUICK NOTE
QUICK NOTE - Deterioration Index  Terrie Alicea 64 y.o. female MRN: [id number]  Unit/Bed#: W -01 Encounter: [id number]      Time Paged: 1616  Room #: W410  Primary RN: Lisa Campos  Arrival Time: 1630  Deterioration index score at time of page: 73.18    PROBLEMS:   SIRS/Sepsis secondary to Pneumonia  Type 2 DM  Dysphagia on tube feeds  Parkinson's disease  Urinary Incontinence  Hypothyroidism  Mood disorder  Cerebral Palsy      PLAN:    Consider repeat CBC, BMP, Lactic acid, vbg, evaluate metabolic component in the setting of SIRS/Sepsis .  Continue IVF, abx with close follow up respiratory status fortuately not hypoxic  Otherwise patient HD stable  Ok to continue MS unit not higher level of care needs at this time.       HPI Statement (Background):   Terrie Alicea is a 64 y.o. year old female who presents from SNF with fevers upon evaluation met SIRS due to Tachycardia, Tmax 102F imagine concerning for PNA started on CTX+flagyl. IVF due to been unable to start tube feeds.     Historical Information   Past Medical History:   Diagnosis Date    Acute metabolic encephalopathy 12/11/2015    Adjustment disorder     Altered mental status 12/11/2015    Anemia     Bipolar 1 disorder (HCC)     Cerebral palsy (HCC)     Chronic hypernatremia 02/06/2016    Closed fracture of left hip (HCC) 01/19/2016    Closed left hip fracture (HCC)     no surgery    Constipation     Dehydration 02/20/2016    Developmental delay     Diabetes mellitus (HCC)     Difficulty walking     Disease of thyroid gland     Diverticulosis     Dysphagia     Worsening    Fracture of multiple ribs of right side     Herpes zoster without complication 06/02/2021    Hip fracture (HCC) 07/26/2015    left    Hyperlipidemia     Hypernatremia 12/28/2018    Hypotension     Impulse control disorder     Incontinence     Intellectual disability due to developmental disorder, unspecified     Microalbuminuria     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)     Osteopathia      Osteoporosis     Peripheral neuropathy     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HCC)     Thrombocytopenia (HCC) 2015     Past Surgical History:   Procedure Laterality Date    ABDOMINAL SURGERY      COLECTOMY MIN      re-anastomosis 16    COLON SURGERY  16    COLONOSCOPY N/A 2016    Procedure: COLONOSCOPY;  Surgeon: Rosalino Jacome MD;  Location: [facility];  Service:     COLONOSCOPY N/A 2016    Procedure: COLONOSCOPY;  Surgeon: Rosalino Jacome MD;  [facility];  Service:     COLONOSCOPY N/A 2017    Procedure: COLONOSCOPY;  Surgeon: Rosalino Jacome MD;  Location: [facility];  Service: Colorectal    COLOSTOMY      EXPLORATORY LAPAROTOMY W/ BOWEL RESECTION N/A 2016    Procedure: exploratory laparotomy, left sigmoidectomy, coloproctostomy, take down splenic flexure, loop colostomy;  Surgeon: Rosalino Jacome MD;  [facility];  Service:     GASTROSTOMY TUBE PLACEMENT N/A 2024    Procedure: INSERTION GASTROSTOMY TUBE OPEN;  Surgeon: Monroe Jean DO;  [facility];  Service: General    NE CLOSURE ENTEROSTOMY LG/SMALL INTESTINE N/A 2016    Procedure: SEGMENTAL COLECTOMY WITH COLOCOLOSTOMY;  Surgeon: Rosalino Jacome MD;  [facility];  Service: Colorectal    UPPER GASTROINTESTINAL ENDOSCOPY         Vitals:   Vitals:    24 1026 24 1130 24 1602 24 1715   BP:   148/86 130/85   BP Location:       Pulse: (!) 119 (!) 120 (!) 122 97   Resp:   (!) 44 (!) 50   Temp: (!) 102.8 °F (39.3 °C) (!) 102.5 °F (39.2 °C) (!) 102.5 °F (39.2 °C) 99.4 °F (37.4 °C)   TempSrc:       SpO2: 99% 100% 100% 99%   Weight:       Height:           Temperature: Temp (24hrs), Av.6 °F (38.7 °C), Min:99.4 °F (37.4 °C), Max:102.8 °F (39.3 °C)  Current: Temperature: 99.4 °F (37.4 °C)    DIAGNOSTIC DATA:    Labs:   Results from last 7 days   Lab Units 24  0451 24  2226   WBC Thousand/uL 10.26* 7.77   HEMOGLOBIN g/dL 9.9* 10.6*  "  HEMATOCRIT % 30.8* 32.8*   PLATELETS Thousands/uL 177 196   SEGS PCT % 68 63   MONO PCT % 7 9   EOS PCT % 1 1     Results from last 7 days   Lab Units 09/26/24  0451 09/25/24  2226   SODIUM mmol/L 136 139   POTASSIUM mmol/L 4.4 4.3   CHLORIDE mmol/L 98 97   CO2 mmol/L 28 35*   BUN mg/dL 30* 34*   CREATININE mg/dL 1.03 1.14   CALCIUM mg/dL 9.8 10.5*   ALK PHOS U/L  --  45   ALT U/L  --  22   AST U/L  --  23              Results from last 7 days   Lab Units 09/25/24  2226   INR  0.92   PTT seconds 22*     Results from last 7 days   Lab Units 09/26/24  0201 09/25/24  2226   LACTIC ACID mmol/L 1.5 2.3*             Results from last 7 days   Lab Units 09/26/24  0451 09/25/24  2226   PROCALCITONIN ng/ml 0.08 0.09     No results found for: \"VANCOTROUGH\"             Applicable Imaging Studies: None    Code Status: Level 1 - Full Code   "

## 2024-09-26 NOTE — PLAN OF CARE
Problem: Prexisting or High Potential for Compromised Skin Integrity  Goal: Skin integrity is maintained or improved  Description: INTERVENTIONS:  - Identify patients at risk for skin breakdown  - Assess and monitor skin integrity  - Assess and monitor nutrition and hydration status  - Monitor labs   - Assess for incontinence   - Turn and reposition patient  - Assist with mobility/ambulation  - Relieve pressure over bony prominences  - Avoid friction and shearing  - Provide appropriate hygiene as needed including keeping skin clean and dry  - Evaluate need for skin moisturizer/barrier cream  - Collaborate with interdisciplinary team   - Patient/family teaching  - Consider wound care consult   Outcome: Progressing     Problem: PAIN - ADULT  Goal: Verbalizes/displays adequate comfort level or baseline comfort level  Description: Interventions:  - Encourage patient to monitor pain and request assistance  - Assess pain using appropriate pain scale  - Administer analgesics based on type and severity of pain and evaluate response  - Implement non-pharmacological measures as appropriate and evaluate response  - Consider cultural and social influences on pain and pain management  - Notify physician/advanced practitioner if interventions unsuccessful or patient reports new pain  Outcome: Progressing     Problem: INFECTION - ADULT  Goal: Absence or prevention of progression during hospitalization  Description: INTERVENTIONS:  - Assess and monitor for signs and symptoms of infection  - Monitor lab/diagnostic results  - Monitor all insertion sites, i.e. indwelling lines, tubes, and drains  - Monitor endotracheal if appropriate and nasal secretions for changes in amount and color  - Sigel appropriate cooling/warming therapies per order  - Administer medications as ordered  - Instruct and encourage patient and family to use good hand hygiene technique  - Identify and instruct in appropriate isolation precautions for  identified infection/condition  Outcome: Progressing  Goal: Absence of fever/infection during neutropenic period  Description: INTERVENTIONS:  - Monitor WBC    Outcome: Progressing     Problem: SAFETY ADULT  Goal: Patient will remain free of falls  Description: INTERVENTIONS:  - Educate patient/family on patient safety including physical limitations  - Instruct patient to call for assistance with activity   - Consult OT/PT to assist with strengthening/mobility   - Keep Call bell within reach  - Keep bed low and locked with side rails adjusted as appropriate  - Keep care items and personal belongings within reach  - Initiate and maintain comfort rounds  - Make Fall Risk Sign visible to staff  - Offer Toileting every  Hours, in advance of need  - Initiate/Maintain alarm  - Obtain necessary fall risk management equipment  - Apply yellow socks and bracelet for high fall risk patients  - Consider moving patient to room near nurses station  Outcome: Progressing     Problem: SAFETY ADULT  Goal: Patient will remain free of falls  Description: INTERVENTIONS:  - Educate patient/family on patient safety including physical limitations  - Instruct patient to call for assistance with activity   - Consult OT/PT to assist with strengthening/mobility   - Keep Call bell within reach  - Keep bed low and locked with side rails adjusted as appropriate  - Keep care items and personal belongings within reach  - Initiate and maintain comfort rounds  - Make Fall Risk Sign visible to staff  - Offer Toileting every  Hours, in advance of need  - Initiate/Maintain alarm  - Obtain necessary fall risk management equipment  - Apply yellow socks and bracelet for high fall risk patients  - Consider moving patient to room near nurses station  Outcome: Progressing  Goal: Maintain or return to baseline ADL function  Description: INTERVENTIONS:  -  Assess patient's ability to carry out ADLs; assess patient's baseline for ADL function and identify  physical deficits which impact ability to perform ADLs (bathing, care of mouth/teeth, toileting, grooming, dressing, etc.)  - Assess/evaluate cause of self-care deficits   - Assess range of motion  - Assess patient's mobility; develop plan if impaired  - Assess patient's need for assistive devices and provide as appropriate  - Encourage maximum independence but intervene and supervise when necessary  - Involve family in performance of ADLs  - Assess for home care needs following discharge   - Consider OT consult to assist with ADL evaluation and planning for discharge  - Provide patient education as appropriate  Outcome: Progressing  Goal: Maintains/Returns to pre admission functional level  Description: INTERVENTIONS:  - Perform AM-PAC 6 Click Basic Mobility/ Daily Activity assessment daily.  - Set and communicate daily mobility goal to care team and patient/family/caregiver.   - Collaborate with rehabilitation services on mobility goals if consulted  - Perform Range of Motion times a day.  - Reposition patient every  hours.  - Dangle patient  times a day  - Stand patient times a day  - Ambulate patient  times a day  - Out of bed to chair  times a day   - Out of bed for meals times a day  - Out of bed for toileting  - Record patient progress and toleration of activity level   Outcome: Progressing     Problem: DISCHARGE PLANNING  Goal: Discharge to home or other facility with appropriate resources  Description: INTERVENTIONS:  - Identify barriers to discharge w/patient and caregiver  - Arrange for needed discharge resources and transportation as appropriate  - Identify discharge learning needs (meds, wound care, etc.)  - Arrange for interpretive services to assist at discharge as needed  - Refer to Case Management Department for coordinating discharge planning if the patient needs post-hospital services based on physician/advanced practitioner order or complex needs related to functional status, cognitive ability,  or social support system  Outcome: Progressing     Problem: Nutrition/Hydration-ADULT  Goal: Nutrient/Hydration intake appropriate for improving, restoring or maintaining nutritional needs  Description: Monitor and assess patient's nutrition/hydration status for malnutrition. Collaborate with interdisciplinary team and initiate plan and interventions as ordered.  Monitor patient's weight and dietary intake as ordered or per policy. Utilize nutrition screening tool and intervene as necessary. Determine patient's food preferences and provide high-protein, high-caloric foods as appropriate.     INTERVENTIONS:  - Monitor oral intake, urinary output, labs, and treatment plans  - Assess nutrition and hydration status and recommend course of action  - Evaluate amount of meals eaten  - Assist patient with eating if necessary   - Allow adequate time for meals  - Recommend/ encourage appropriate diets, oral nutritional supplements, and vitamin/mineral supplements  - Order, calculate, and assess calorie counts as needed  - Recommend, monitor, and adjust tube feedings and TPN/PPN based on assessed needs  - Assess need for intravenous fluids  - Provide specific nutrition/hydration education as appropriate  - Include patient/family/caregiver in decisions related to nutrition  Outcome: Progressing

## 2024-09-26 NOTE — ASSESSMENT & PLAN NOTE
Lab Results   Component Value Date    HGBA1C 6.2 (H) 08/13/2024     Recent Labs     09/26/24  0355   POCGLU 191*     Blood Sugar Average: Last 72 hrs:  (P) 191  Continue home regular insulin 4 units every 6 hours  Regular insulin sliding scale every 6 hours   Nutrition consulted this morning for recommendations for peg tub feeds

## 2024-09-26 NOTE — PROGRESS NOTES
Progress Note - Hospitalist   Name: Terrie Alicea 64 y.o. female I MRN: [id number]  Unit/Bed#: W -01 I Date of Admission: 9/25/2024   Date of Service: 9/26/2024 I Hospital Day: 1    Assessment & Plan  Severe sepsis (HCC)  -Patient was brought in by EMS from rehab facility for a fever, her fever was initially 100.9F, was given Tylenol at the facility which had than dropped to 100 F  - Met SIRS on admission with Temp 102 F.    -LA: 2.3  CXR: Pneumonia inferomedial right lung  Plan:  Initiate IV antibiotic therapy with ceftriaxone and Flagyl  Follow blood cultures, sputum culture, MRSA culture  Will obtain procal, CBC, and BMP repeat in the AM  Tylenol 975 mg as needed every 6 hours for fevers   Discontinued boateng as patient does not have boateng at baseline and no longer requires  Due to recent hospitalization with ICU after reported hypotension earlier this month followed by changes to patient's baseline, CT head negative   Pneumonia  -She was recently admitted for aspiration pneumonia in August treated with ceftriaxone, and discharged on Vantin  CXR this admission: inferomedial right lung pneumonia  Plan:  See plan for sepsis   Diabetes mellitus type 2, insulin dependent ([facility])  Lab Results   Component Value Date    HGBA1C 6.2 (H) 08/13/2024     Recent Labs     09/26/24  0355   POCGLU 191*     Blood Sugar Average: Last 72 hrs:  (P) 191  Continue home regular insulin 4 units every 6 hours  Regular insulin sliding scale every 6 hours   Nutrition consulted this morning for recommendations for peg tub feeds  Parkinson's disease (HCC)  Continue home Sinemet  Urinary incontinence  Continue home oxybutynin  Hypotension  Continue home midodrine   Hypothyroidism  Continue home levothyroxine   Mood disturbance  Continue home abilify, citalopram, and trazodone     VTE Pharmacologic Prophylaxis: VTE Score: 6 High Risk (Score >/= 5) - Pharmacological DVT Prophylaxis Ordered: enoxaparin (Lovenox). Sequential  Compression Devices Ordered.    Mobility:   Basic Mobility Inpatient Raw Score: 6  JH-HLM Goal: 2: Bed activities/Dependent transfer  JH-HLM Achieved: 1: Laying in bed  JH-HLM Goal achieved. Continue to encourage appropriate mobility.    Patient Centered Rounds: I performed bedside rounds with nursing staff today.   Discussions with Specialists or Other Care Team Provider: None     Education and Discussions with Family / Patient: Updated  (caregiver Lisa and Anna) at bedside.    Current Length of Stay: 1 day(s)  Current Patient Status: Inpatient   Certification Statement: The patient will continue to require additional inpatient hospital stay due to IV antibiotics and sepsis   Discharge Plan: Anticipate discharge in 48-72 hrs to rehab facility.    Code Status: Level 1 - Full Code    Subjective   Patient was seen this morning laying in bed alert and looks comfortable with her eyes open.  She is nonverbal however looks around the room during evaluation.  Writer rounded with nursing in the room, nursing reports whether or not patient needs Kenny catheter in.  Per chart review during previous admission patient needed to be straight cathed.  Writer will follow up with caregiver for further clarification.  Patient does not seem to be in any pain or acute distress, no diaphoresis seen, and no guarding or wincing with abdominal exam.     Patient was evaluated on rounds with treatment team with caregiver, Lisa and group home coordinator Anna at bedside. Discussed treatment plan at this time, questions were answered and acknowledged. Lisa and Anna report patient's change in mentation and neuro finding such as less concentration and dilated eyes since ICU admission previously. In addition, patient would say yes and no to questions at baseline however has been nonverbal since ICU admission due to hypotension and sepsis. Due to caregivers concerns and the changes to baseline mentation CT head will be  done to rule out possible past stroke or acute findings. At baseline, patient does not have a boateng, this will be discontinued. They report moist cough starting last Monday as well. Patient will be able to return to group home once medically cleared for discharge.     Objective     Vitals:   Temp (24hrs), Av °F (38.3 °C), Min:100.1 °F (37.8 °C), Max:102 °F (38.9 °C)    Temp:  [100.1 °F (37.8 °C)-102 °F (38.9 °C)] 100.9 °F (38.3 °C)  HR:  [] 111  Resp:  [16-22] 22  BP: (117-138)/(56-86) 117/70  SpO2:  [82 %-100 %] 100 %  Body mass index is 20.55 kg/m².     Input and Output Summary (last 24 hours):     Intake/Output Summary (Last 24 hours) at 2024 0657  Last data filed at 2024 2259  Gross per 24 hour   Intake --   Output 175 ml   Net -175 ml       Physical Exam  Constitutional:       General: She is in acute distress.      Appearance: She is ill-appearing.   HENT:      Head: Normocephalic and atraumatic.      Comments: Dystonic orofacial movements     Mouth/Throat:      Mouth: Mucous membranes are moist.      Pharynx: Oropharynx is clear.   Eyes:      Conjunctiva/sclera: Conjunctivae normal.      Pupils: Pupils are equal, round, and reactive to light.   Cardiovascular:      Rate and Rhythm: Normal rate and regular rhythm.      Pulses: Normal pulses.      Heart sounds: Normal heart sounds. No murmur heard.  Pulmonary:      Effort: Pulmonary effort is normal.      Breath sounds: Rhonchi (R lower field) present.   Abdominal:      Palpations: Abdomen is soft.      Tenderness: There is no abdominal tenderness. There is no guarding.      Comments: PEG tube in place, no drainage or erythema noted   Genitourinary:     Comments: Boateng catheter in place   Musculoskeletal:      Right lower leg: No edema.      Left lower leg: No edema.   Neurological:      Comments: Contracture upper and lower extremities BL        Lines/Drains:  Lines/Drains/Airways       Active Status       Name Placement date Placement  "time Site Days    Gastrostomy/Enterostomy  16 Fr. LUQ 08/26/24  1643  LUQ  30    Urethral Catheter Latex 18 Fr. 09/25/24  2258  Latex  less than 1                  Urinary Catheter:  Goal for removal: No longer needed. Will place order to discontinue           Lab Results: I have reviewed the following results: none   Results from last 7 days   Lab Units 09/26/24  0451   WBC Thousand/uL 10.26*   HEMOGLOBIN g/dL 9.9*   HEMATOCRIT % 30.8*   PLATELETS Thousands/uL 177   SEGS PCT % 68   LYMPHO PCT % 22   MONO PCT % 7   EOS PCT % 1     Results from last 7 days   Lab Units 09/26/24  0451 09/25/24  2226   SODIUM mmol/L 136 139   POTASSIUM mmol/L 4.4 4.3   CHLORIDE mmol/L 98 97   CO2 mmol/L 28 35*   BUN mg/dL 30* 34*   CREATININE mg/dL 1.03 1.14   ANION GAP mmol/L 10 7   CALCIUM mg/dL 9.8 10.5*   ALBUMIN g/dL  --  3.9   TOTAL BILIRUBIN mg/dL  --  0.43   ALK PHOS U/L  --  45   ALT U/L  --  22   AST U/L  --  23   GLUCOSE RANDOM mg/dL 222* 178*     Results from last 7 days   Lab Units 09/25/24  2226   INR  0.92     Results from last 7 days   Lab Units 09/26/24  0355 09/19/24  1100   POC GLUCOSE mg/dl 191* 100         Results from last 7 days   Lab Units 09/26/24  0451 09/26/24  0201 09/25/24  2226   LACTIC ACID mmol/L  --  1.5 2.3*   PROCALCITONIN ng/ml 0.08  --  0.09       Recent Cultures (last 7 days):         Imaging Review: Reviewed radiology reports from this admission including: xray(s). CT head.   9/25/24 X-ray chest portable: Pneumonia inferomedial right lung. Follow-up after treatment to ensure resolution is recommended.    9/26/24 CT head without contrast: \"No acute intracranial abnormality. Cerebral and cerebellar atrophy again demonstrated, greater than expected for the patient's age\"     Other Studies: EKG was reviewed.     Last 24 Hours Medication List:     Current Facility-Administered Medications:     ARIPiprazole (ABILIFY) tablet 2 mg, HS    artificial tear ophthalmic ointment, HS    ascorbic acid (VITAMIN C) " tablet 125 mg, Daily    baclofen tablet 10 mg, TID    calcium carbonate (TUMS) chewable tablet 500 mg, BID PRN    carbidopa-levodopa (SINEMET CR)  mg per ER tablet 2 tablet, BID    ceftriaxone (ROCEPHIN) 1 g/50 mL in dextrose IVPB, Q24H, Last Rate: 1,000 mg (09/26/24 0613)    Cholecalciferol (VITAMIN D3) tablet 1,000 Units, Daily    citalopram (CeleXA) tablet 20 mg, Daily    enoxaparin (LOVENOX) subcutaneous injection 40 mg, Daily    Ferrous Sulfate oral syrup 300 mg, Every Other Day    guaiFENesin (ROBITUSSIN) oral liquid 200 mg, 4x Daily PRN    insulin lispro (HumALOG/ADMELOG) 100 units/mL subcutaneous injection 1-5 Units, 4x Daily (AC & HS) **AND** Fingerstick Glucose (POCT), 4x Daily AC and at bedtime    insulin lispro (HumALOG/ADMELOG) 100 units/mL subcutaneous injection 4 Units, Q6H    Ketotifen Fumarate (ZADITOR) 0.035 % ophthalmic solution 1 drop, BID    levothyroxine tablet 25 mcg, Early Morning    LORazepam (ATIVAN) tablet 0.5 mg, HS    methenamine hippurate (HIPREX) tablet 1 g, BID    metroNIDAZOLE (FLAGYL) IVPB (premix) 500 mg 100 mL, Q8H, Last Rate: 500 mg (09/26/24 0429)    midodrine (PROAMATINE) tablet 5 mg, TID AC    oxybutynin (DITROPAN) tablet 5 mg, Daily    polyethylene glycol (MIRALAX) packet 17 g, Daily    pravastatin (PRAVACHOL) tablet 40 mg, Daily With Dinner    traZODone (DESYREL) tablet 100 mg, HS    valproic acid (DEPAKENE) oral soln 125 mg, Q12H ROSEMARY    Administrative Statements   Today, Patient Was Seen By: Brooke Mendelson, DO  I have spent a total time of 30 minutes in caring for this patient on the day of the visit/encounter including Diagnostic results, Prognosis, Patient and family education, Risk factor reductions, Counseling / Coordination of care, Documenting in the medical record, Reviewing / ordering tests, medicine, procedures  , Obtaining or reviewing history  , and Communicating with other healthcare professionals . Topics discussed with the patient / family include  symptom assessment and management and medication review.    **Please Note: This note may have been constructed using a voice recognition system.**

## 2024-09-26 NOTE — SEPSIS NOTE
"  Sepsis Note   Terrie Alicea 64 y.o. female MRN: [id number]  Unit/Bed#: ED-03 Encounter: [id number]       Initial Sepsis Screening       Row Name 09/25/24 2302                Is the patient's history suggestive of a new or worsening infection? Yes (Proceed)  -CL        Suspected source of infection suspect infection, source unknown  -CL        Indicate SIRS criteria Tachycardia > 90 bpm;Hyperthemia > 38.3C (100.9F) OR Hypothermia <36C (96.8F)  -CL        Are two or more of the above signs & symptoms of infection both present and new to the patient? Yes (Proceed)  -CL        Assess for evidence of organ dysfunction: Are any of the below criteria present within 6 hours of suspected infection and SIRS criteria that are NOT considered to be chronic conditions? Lactate > 2.0  -CL        Date of presentation of severe sepsis 09/25/24  -CL        Time of presentation of severe sepsis 2302  -CL        Sepsis Note: Click \"NEXT\" below (NOT \"close\") to generate sepsis note based on above information. YES (proceed by clicking \"NEXT\")  -CL                  User Key  (r) = Recorded By, (t) = Taken By, (c) = Cosigned By      Initials Name Provider Type    CL Binh Parra MD Physician                    Default Flowsheet Data (Last 720 Hours)       Sepsis Reassess       Row Name 09/25/24 2317                   Repeat Volume Status and Tissue Perfusion Assessment Performed    Date of Reassessment: 09/25/24  -CL        Time of Reassessment: 2317  -CL        Sepsis Reassessment Note: Click \"NEXT\" below (NOT \"close\") to generate sepsis reassessment note. YES (proceed by clicking \"NEXT\")  -CL        Repeat Volume Status and Tissue Perfusion Assessment Performed --                  User Key  (r) = Recorded By, (t) = Taken By, (c) = Cosigned By      Initials Name Provider Type    CL Binh Parra MD Physician                    There is no height or weight on file to calculate BMI.  Wt Readings from Last 1 Encounters: "   09/23/24 39.4 kg (86 lb 14.4 oz)        Ideal body weight: 47.1 kg (103 lb 14.5 oz)

## 2024-09-26 NOTE — UTILIZATION REVIEW
Initial Clinical Review    Admission: Date/Time/Statement:   Admission Orders (From admission, onward)       Ordered        09/25/24 2354  INPATIENT ADMISSION  Once                          Orders Placed This Encounter   Procedures    INPATIENT ADMISSION     Standing Status:   Standing     Number of Occurrences:   1     Order Specific Question:   Level of Care     Answer:   Med Surg [16]     Order Specific Question:   Estimated length of stay     Answer:   More than 2 Midnights     Order Specific Question:   Certification     Answer:   I certify that inpatient services are medically necessary for this patient for a duration of greater than two midnights. See H&P and MD Progress Notes for additional information about the patient's course of treatment.     ED Arrival Information       Expected   -    Arrival   9/25/2024 22:03    Acuity   Emergent              Means of arrival   Ambulance    Escorted by   [facility]    Service   Hospitalist    Admission type   Emergency              Arrival complaint   Fever             Chief Complaint   Patient presents with    Fever     Presents via ems from [facility]. Pt recently d/c from here for aspiration pneumonia and sepsis. Per facility, temp was 100.1 tonight. Pt nonverbal at baseline.        Initial Presentation: 64 y.o. female with hx cerebral palsy, T2DM, parkinson's disease, and mood disorders, d/c'ed from hospital 9/19/24 with sepsis, aspiration PNA  who presents to ED via EMS from rehab facility with fever of 100.8 at facility  , given Tylenol at facility . Pt was supposed to dc from [facility] today back to group home. Pt non verbal  . On exam, pt febrile 102 F , tachycardic .Pt appears to be in some distress with facial grimacing, and twitching. According to facility staff this is not her baseline. Rhonchi R lower lung field. RA sat 82 %, O2 @ 2 L applied with O2 sat 95-98 % .  PEG in place , contractures BUE, BLE  . Labs : LA 2.3--->1.5   CXR shows Pneumonia inferomedial right lung . Pt given IVF, IV abx in ED.  Admitted as Inpatient with severe sepsis, PNA. PLan- IV abx- Rocephin and Flagyl . F/U blood and MRSA cx . Obtain sputum cx . Continue enteric tube feeds. Continue PTA meds .   Anticipated Length of Stay/Certification Statement:  Patient will be admitted on an inpatient basis with an anticipated length of stay of greater than 2 midnights secondary to sepsis.     Date: 9/26   Day 2:    Temp 102.8 F this am . Receiving IV abx-  ceftriaxone and Flagyl . WBC up to 10.26 today .F./U blood, sputum, MRSA cx .  Obtain procal, CBC, and BMP repeat in the AM  .  PRN Tylenol for fevers.  Discontinue boateng as patient does not have boateng at baseline and no longer requires . CT head obtained today 2/2  recent hospitalization with ICU after reported hypotension earlier this month followed by changes to patient's baseline-(patient would say yes and no to questions at baseline prior to ICU admission in September  , has been nonverbal since  - no acute findings on CT  . Pt non verbal, eyes open, looking around room .  Dystonic orofacial movements . . Once medically stable plan is for d/c back to group home .     Date: 9/27   Day 3: Has surpassed a 2nd midnight with active treatments and services.  T max yesterday was 102.5 F with tachycardia, tachypnea, labored breathing around 4 pm.  Repeat sepsis workup was unremarkable.  Fever broke with tylenol. She also had hypotensive episodes, which self resolved. IVF infusing , started yesterday afternoon .   Overnight, she was also retaining urine.  She had to be straight cathed once after which she was able to urinate on her own.  On exam, pt awake and not in any distress.  She is not interactive and noncommunicative at baseline.+ PAEZ per nsg . Diminished breath sounds . ON RA .  PLan- Continue IV ceftriaxone to complete 5-day course. Discontinue Flagyl . Follow blood cultures, sputum culture, MRSA culture . Trend  CBC, and BMP. Tylenol 975 mg as needed every 6 hours for fevers                  Scheduled Medications:  ARIPiprazole, 2 mg, Per G Tube, HS  artificial tear, , Both Eyes, HS  ascorbic acid, 125 mg, Per G Tube, Daily  baclofen, 10 mg, Per G Tube, TID  carbidopa-levodopa, 2 tablet, Oral, BID  cefTRIAXone, 1,000 mg, Intravenous, Q24H  cholecalciferol, 1,000 Units, Per G Tube, Daily  citalopram, 20 mg, Per G Tube, Daily  enoxaparin, 40 mg, Subcutaneous, Daily  Ferrous Sulfate, 300 mg, Per PEG Tube, Every Other Day  insulin regular, 1-5 Units, Subcutaneous, Q6H  insulin regular, 4 Units, Subcutaneous, Q6H  Ketotifen Fumarate, 1 drop, Both Eyes, BID  levothyroxine, 25 mcg, Per G Tube, Early Morning  LORazepam, 0.5 mg, Per PEG Tube, HS  midodrine, 5 mg, Per G Tube, TID AC  oxybutynin, 5 mg, Per G Tube, Daily  polyethylene glycol, 17 g, Per PEG Tube, Daily  pravastatin, 40 mg, Oral, Daily With Dinner  traZODone, 100 mg, Per G Tube, HS  valproic acid, 125 mg, Per PEG Tube, Q12H ROSEMARY    metroNIDAZOLE (FLAGYL) IVPB (premix) 500 mg 100 mL  Dose: 500 mg  Freq: Every 8 hours Route: IV  Last Dose: 500 mg (09/27/24 0410)  Start: 09/26/24 0400 End: 09/27/24 1025    Continuous IV Infusions:  multi-electrolyte, 100 mL/hr, Intravenous, Continuous      PRN Meds:  acetaminophen, 650 mg, Per PEG Tube, Q6H PRN x1 9/26   end: 09/26/24 1421   calcium carbonate, 500 mg, Oral, BID PRN  guaiFENesin, 200 mg, Per G Tube, 4x Daily PRN  acetaminophen (TYLENOL) tablet 975 mg  Dose: 975 mg  Freq: Every 6 hours PRN Route: PEG TUBE  PRN Reasons: mild pain,headaches,fever  Start: 09/26/24 1421 x1 9/26       ED Treatment-Medication Administration from 09/25/2024 2203 to 09/26/2024 0125         Date/Time Order Dose Route Action     09/25/2024 2251 cefepime (MAXIPIME) 2 g/50 mL dextrose IVPB 2,000 mg Intravenous New Bag     09/25/2024 2321 vancomycin (VANCOCIN) IVPB (premix in dextrose) 1,000 mg 200 mL 1,000 mg Intravenous New Bag     09/25/2024 2245  multi-electrolyte (ISOLYTE-S PH 7.4) bolus 500 mL 500 mL Intravenous New Bag         ED Triage Vitals   Temperature Pulse Respirations Blood Pressure SpO2 Pain Score   09/25/24 2211 09/25/24 2207 09/25/24 2207 09/25/24 2207 09/25/24 2207 09/26/24 0853   (!) 102 °F (38.9 °C) 100 18 138/85 97 % Med Not Given for Pain - for MAR use only     Weight (last 2 days)       Date/Time Weight    09/26/24 01:28:38 44.6 (98.33)            Vital Signs (last 3 days)       Date/Time Temp Pulse Resp BP MAP (mmHg) SpO2 Calculated FIO2 (%) - Nasal Cannula Nasal Cannula O2 Flow Rate (L/min) O2 Device Patient Position - Orthostatic VS Pain    09/27/24 1525 -- -- -- 114/82 -- -- -- -- -- -- --    09/27/24 15:24:30 99.5 °F (37.5 °C) 70 18 88/56 67 98 % -- -- -- -- --    09/27/24 0938 -- -- -- -- -- -- -- -- -- -- No Pain    09/27/24 07:45:51 99.5 °F (37.5 °C) 82 18 121/67 85 97 % -- -- -- -- --    09/26/24 22:19:12 -- 70 -- 90/57 68 97 % -- -- -- -- --    09/26/24 21:51:04 -- 64 -- 88/55 66 96 % -- -- -- -- --    09/26/24 2138 99.6 °F (37.6 °C) -- -- -- -- -- -- -- -- -- --    09/26/24 1924 -- -- -- -- -- -- -- -- -- -- No Pain    09/26/24 18:53:37 98.4 °F (36.9 °C) 94 -- -- -- 98 % -- -- -- -- --    09/26/24 17:15:08 99.4 °F (37.4 °C) 97 50 130/85 100 99 % -- -- -- -- --    09/26/24 1610 -- -- -- -- -- -- -- -- -- -- Med Not Given for Pain - for MAR use only    09/26/24 16:02:25 102.5 °F (39.2 °C) 122 44 148/86 107 100 % -- -- -- -- --    09/26/24 11:30:19 102.5 °F (39.2 °C) 120 -- -- -- 100 % -- -- -- -- --    09/26/24 10:26:26 102.8 °F (39.3 °C) 119 -- -- -- 99 % -- -- -- -- --    09/26/24 0853 -- -- -- -- -- -- -- -- -- -- Med Not Given for Pain - for MAR use only    09/26/24 07:50:30 102.2 °F (39 °C) -- -- 152/95 114 -- -- -- -- -- --    09/26/24 06:21:17 100.9 °F (38.3 °C) 111 -- -- -- 100 % -- -- -- -- --    09/26/24 01:28:38 100.1 °F (37.8 °C) 84 22 117/70 86 98 % -- -- -- -- --    09/26/24 0015 -- 85 20 131/56 85 99 % 28 2 L/min  Nasal cannula Lying --    09/26/24 0000 -- 88 18 127/64 86 98 % 28 2 L/min Nasal cannula Lying --    09/25/24 2345 -- 88 16 126/65 89 97 % 28 2 L/min Nasal cannula Lying --    09/25/24 2330 -- 85 18 128/72 92 95 % 28 2 L/min Nasal cannula Lying --    09/25/24 2317 -- -- -- -- -- -- -- -- Nasal cannula -- --    09/25/24 2315 -- 83 20 136/74 99 98 % 28 2 L/min Nasal cannula Lying --    09/25/24 2300 -- 92 16 136/86 106 97 % 28 2 L/min Nasal cannula Lying --    09/25/24 2245 -- 109 20 130/82 101 82 % -- --  None (Room air) Lying --    09/25/24 2211 102 °F (38.9 °C) -- -- -- -- -- -- -- -- -- --    09/25/24 2207 -- 100 18 138/85 -- 97 % -- -- None (Room air) -- --              Pertinent Labs/Diagnostic Test Results:   Radiology:  CT head wo contrast   Final Interpretation by Calli Bailon MD (09/26 1453)      No acute intracranial abnormality. Cerebral and cerebellar atrophy again demonstrated, greater than expected for the patient's age.                  Workstation performed: [id number]         XR chest 1 view portable   ED Interpretation by Binh Parra MD (09/25 6282)   Per my independent interpretation: Right lower lung field consolidation      Final Interpretation by Lisa Us MD (09/26 0244)      Pneumonia inferomedial right lung. Follow-up after treatment to ensure resolution is recommended.      The study was marked in EPIC for immediate notification.            Workstation performed: [id number]           Cardiology:  ECG 12 lead   Final Result by Abraham Mcfarlane MD (09/26 2207)   Normal sinus rhythm   Nonspecific ST and T wave abnormality   Abnormal ECG   When compared with ECG of 30-AUG-2024 05:34,   Vent. rate has increased BY  41 BPM   Confirmed by Abraham Mcfarlane ([id number]) on 9/26/2024 10:07:31 PM        GI:  No orders to display       Results from last 7 days   Lab Units 09/26/24  0201   SARS-COV-2  Negative     Results from last 7 days   Lab Units 09/27/24  0533 09/26/24  6296  09/26/24  0451 09/25/24  2226   WBC Thousand/uL 6.55 8.89 10.26* 7.77   HEMOGLOBIN g/dL 8.9* 9.3* 9.9* 10.6*   HEMATOCRIT % 27.3* 28.5* 30.8* 32.8*   PLATELETS Thousands/uL 141* 172 177 196   TOTAL NEUT ABS Thousands/µL 4.48  --  7.06 4.89         Results from last 7 days   Lab Units 09/27/24  0533 09/26/24  2139 09/26/24  0451 09/25/24  2226   SODIUM mmol/L 138 139 136 139   POTASSIUM mmol/L 5.0 4.2 4.4 4.3   CHLORIDE mmol/L 100 99 98 97   CO2 mmol/L 32 33* 28 35*   ANION GAP mmol/L 6 7 10 7   BUN mg/dL 33* 27* 30* 34*   CREATININE mg/dL 0.87 0.99 1.03 1.14   EGFR ml/min/1.73sq m 71 61 58 51   CALCIUM mg/dL 8.6 9.0 9.8 10.5*     Results from last 7 days   Lab Units 09/25/24  2226   AST U/L 23   ALT U/L 22   ALK PHOS U/L 45   TOTAL PROTEIN g/dL 7.2   ALBUMIN g/dL 3.9   TOTAL BILIRUBIN mg/dL 0.43     Results from last 7 days   Lab Units 09/27/24  1145 09/27/24  0635 09/27/24  0247 09/26/24  2326 09/26/24  2036 09/26/24  1857 09/26/24  1729 09/26/24  1555 09/26/24  1129 09/26/24  0928 09/26/24  0752 09/26/24  0355   POC GLUCOSE mg/dl 82 259* 261* 281* 167* 128 59* 77 309* 326* 191* 191*     Results from last 7 days   Lab Units 09/27/24  0533 09/26/24  2139 09/26/24  0451 09/25/24  2226   GLUCOSE RANDOM mg/dL 263* 168* 222* 178*             Results from last 7 days   Lab Units 09/26/24  0201 09/26/24  0044 09/25/24  2226   HS TNI 0HR ng/L  --   --  5   HS TNI 2HR ng/L  --  5  --    HSTNI D2 ng/L  --  0  --    HS TNI 4HR ng/L 5  --   --    HSTNI D4 ng/L 0  --   --          Results from last 7 days   Lab Units 09/25/24  2226   PROTIME seconds 13.1   INR  0.92   PTT seconds 22*         Results from last 7 days   Lab Units 09/27/24  0533 09/26/24  0451 09/25/24  2226   PROCALCITONIN ng/ml 0.13 0.08 0.09     Results from last 7 days   Lab Units 09/27/24  0533 09/26/24  2139 09/26/24  0201 09/25/24  2226   LACTIC ACID mmol/L 1.4 1.0 1.5 2.3*           Results from last 7 days   Lab Units 09/25/24  2240   CLARITY UA  Turbid    COLOR UA  Light Yellow   SPEC GRAV UA  1.017   PH UA  7.5   GLUCOSE UA mg/dl 100 (1/10%)*   KETONES UA mg/dl Negative   BLOOD UA  Negative   PROTEIN UA mg/dl Negative   NITRITE UA  Negative   BILIRUBIN UA  Negative   UROBILINOGEN UA (BE) mg/dl <2.0   LEUKOCYTES UA  Negative     Results from last 7 days   Lab Units 09/26/24  0201   INFLUENZA A PCR  Negative   INFLUENZA B PCR  Negative   RSV PCR  Negative                             Results from last 7 days   Lab Units 09/25/24  2235 09/25/24  2226   BLOOD CULTURE  No Growth at 24 hrs. No Growth at 24 hrs.                   Past Medical History:   Diagnosis Date    Acute metabolic encephalopathy 12/11/2015    Adjustment disorder     Altered mental status 12/11/2015    Anemia     Bipolar 1 disorder ([facility])     Cerebral palsy ([facility])     Chronic hypernatremia 02/06/2016    Closed fracture of left hip ([facility]) 01/19/2016    Closed left hip fracture ([facility])     no surgery    Constipation     Dehydration 02/20/2016    Developmental delay     Diabetes mellitus ([facility])     Difficulty walking     Disease of thyroid gland     Diverticulosis     Dysphagia     Worsening    Fracture of multiple ribs of right side     Herpes zoster without complication 06/02/2021    Hip fracture ([facility]) 07/26/2015    left    Hyperlipidemia     Hypernatremia 12/28/2018    Hypotension     Impulse control disorder     Incontinence     Intellectual disability due to developmental disorder, unspecified     Microalbuminuria     Neuropathy in diabetes ([facility])     Osteopathia     Osteoporosis     Peripheral neuropathy     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 female    Network Utilization Review Department  ATTENTION: Please call  with any questions or concerns to [phone] and carefully listen to the prompts so that you are directed to the right person. All voicemails are confidential.   For Discharge needs, contact Care Management DC Support Team at [phone] opt. [phone]  Send all requests for admission clinical reviews, approved or denied determinations and any other requests to dedicated fax number below belonging to the campus where the patient is receiving treatment. List of dedicated fax numbers for the Facilities:  FACILITY NAME UR FAX NUMBER   ADMISSION DENIALS (Administrative/Medical Necessity) [phone]   DISCHARGE SUPPORT TEAM (NETWORK) [phone]   PARENT CHILD HEALTH (Maternity/NICU/Pediatrics) [phone]   [facility] [phone]   [facility] [phone]   [facility] [phone]   [facility] [phone]   [facility] [phone]   [facility] [phone]   [facility] [phone]   [facility] [phone]   [facility] [phone]   [facility] [phone]   [facility] [phone]   [facility] [phone]

## 2024-09-26 NOTE — PLAN OF CARE
Problem: Prexisting or High Potential for Compromised Skin Integrity  Goal: Skin integrity is maintained or improved  Description: INTERVENTIONS:  - Identify patients at risk for skin breakdown  - Assess and monitor skin integrity  - Assess and monitor nutrition and hydration status  - Monitor labs   - Assess for incontinence   - Turn and reposition patient  - Assist with mobility/ambulation  - Relieve pressure over bony prominences  - Avoid friction and shearing  - Provide appropriate hygiene as needed including keeping skin clean and dry  - Evaluate need for skin moisturizer/barrier cream  - Collaborate with interdisciplinary team   - Patient/family teaching  - Consider wound care consult   Outcome: Progressing     Problem: PAIN - ADULT  Goal: Verbalizes/displays adequate comfort level or baseline comfort level  Description: Interventions:  - Encourage patient to monitor pain and request assistance  - Assess pain using appropriate pain scale  - Administer analgesics based on type and severity of pain and evaluate response  - Implement non-pharmacological measures as appropriate and evaluate response  - Consider cultural and social influences on pain and pain management  - Notify physician/advanced practitioner if interventions unsuccessful or patient reports new pain  Outcome: Progressing     Problem: INFECTION - ADULT  Goal: Absence or prevention of progression during hospitalization  Description: INTERVENTIONS:  - Assess and monitor for signs and symptoms of infection  - Monitor lab/diagnostic results  - Monitor all insertion sites, i.e. indwelling lines, tubes, and drains  - Monitor endotracheal if appropriate and nasal secretions for changes in amount and color  - [place] appropriate cooling/warming therapies per order  - Administer medications as ordered  - Instruct and encourage patient and family to use good hand hygiene technique  - Identify and instruct in appropriate isolation precautions for  identified infection/condition  Outcome: Progressing  Goal: Absence of fever/infection during neutropenic period  Description: INTERVENTIONS:  - Monitor WBC    Outcome: Progressing     Problem: SAFETY ADULT  Goal: Patient will remain free of falls  Description: INTERVENTIONS:  - Educate patient/family on patient safety including physical limitations  - Instruct patient to call for assistance with activity   - Consult OT/PT to assist with strengthening/mobility   - Keep Call bell within reach  - Keep bed low and locked with side rails adjusted as appropriate  - Keep care items and personal belongings within reach  - Initiate and maintain comfort rounds  - Make Fall Risk Sign visible to staff  - Offer Toileting every  Hours, in advance of need  - Initiate/Maintain alarm  - Obtain necessary fall risk management equipment  - Apply yellow socks and bracelet for high fall risk patients  - Consider moving patient to room near nurses station  Outcome: Progressing  Goal: Maintain or return to baseline ADL function  Description: INTERVENTIONS:  -  Assess patient's ability to carry out ADLs; assess patient's baseline for ADL function and identify physical deficits which impact ability to perform ADLs (bathing, care of mouth/teeth, toileting, grooming, dressing, etc.)  - Assess/evaluate cause of self-care deficits   - Assess range of motion  - Assess patient's mobility; develop plan if impaired  - Assess patient's need for assistive devices and provide as appropriate  - Encourage maximum independence but intervene and supervise when necessary  - Involve family in performance of ADLs  - Assess for home care needs following discharge   - Consider OT consult to assist with ADL evaluation and planning for discharge  - Provide patient education as appropriate  Outcome: Progressing  Goal: Maintains/Returns to pre admission functional level  Description: INTERVENTIONS:  - Perform AM-PAC 6 Click Basic Mobility/ Daily Activity  assessment daily.  - Set and communicate daily mobility goal to care team and patient/family/caregiver.   - Collaborate with rehabilitation services on mobility goals if consulted  - Perform Range of Motion times a day.  - Reposition patient every  hours.  - Dangle patient  times a day  - Stand patient times a day  - Ambulate patient  times a day  - Out of bed to chair  times a day   - Out of bed for meals times a day  - Out of bed for toileting  - Record patient progress and toleration of activity level   Outcome: Progressing     Problem: DISCHARGE PLANNING  Goal: Discharge to home or other facility with appropriate resources  Description: INTERVENTIONS:  - Identify barriers to discharge w/patient and caregiver  - Arrange for needed discharge resources and transportation as appropriate  - Identify discharge learning needs (meds, wound care, etc.)  - Arrange for interpretive services to assist at discharge as needed  - Refer to Case Management Department for coordinating discharge planning if the patient needs post-hospital services based on physician/advanced practitioner order or complex needs related to functional status, cognitive ability, or social support system  Outcome: Progressing     Problem: Knowledge Deficit  Goal: Patient/family/caregiver demonstrates understanding of disease process, treatment plan, medications, and discharge instructions  Description: Complete learning assessment and assess knowledge base.  Interventions:  - Provide teaching at level of understanding  - Provide teaching via preferred learning methods  Outcome: Not Met     Problem: Nutrition/Hydration-ADULT  Goal: Nutrient/Hydration intake appropriate for improving, restoring or maintaining nutritional needs  Description: Monitor and assess patient's nutrition/hydration status for malnutrition. Collaborate with interdisciplinary team and initiate plan and interventions as ordered.  Monitor patient's weight and dietary intake as ordered  or per policy. Utilize nutrition screening tool and intervene as necessary. Determine patient's food preferences and provide high-protein, high-caloric foods as appropriate.     INTERVENTIONS:  - Monitor oral intake, urinary output, labs, and treatment plans  - Assess nutrition and hydration status and recommend course of action  - Evaluate amount of meals eaten  - Assist patient with eating if necessary   - Allow adequate time for meals  - Recommend/ encourage appropriate diets, oral nutritional supplements, and vitamin/mineral supplements  - Order, calculate, and assess calorie counts as needed  - Recommend, monitor, and adjust tube feedings and TPN/PPN based on assessed needs  - Assess need for intravenous fluids  - Provide specific nutrition/hydration education as appropriate  - Include patient/family/caregiver in decisions related to nutrition  Outcome: Progressing

## 2024-09-26 NOTE — CONSULTS
TF     Glucerna 1.2 @ 80 ml/hr X 12 hrs to run nightly.     Water flushes of 125 ml q 4 hrs while TF is running.     EN regimen provides 960 ml total volume, 1152 kcals, 58 grams of protein, 109 g CHO, and 1148 ml total water.

## 2024-09-26 NOTE — QUICK NOTE
Received a message from critical care about patient's deterioration index.  Patient was examined at bedside.  Nursing notes that the patient has had a respiratory rate in the 20s to 30s, tachycardia to the 120s, and fever to 102.8.  On my exam patient had respiratory rate in the 40s, heart rate in the 90s, temp of 99.  Patient was on 1 L satting at 100% and was weaned to room air still satting in the upper 90s.  Patient does not appear to be in respiratory distress.  Physical exam is limited as the patient does not follow commands however limited lung auscultation did not note diffuse wheezing, rhonchi, or crackles.    Follow-up on CBC BMP lactate and VBG

## 2024-09-26 NOTE — ASSESSMENT & PLAN NOTE
-She was recently admitted for aspiration pneumonia in August treated with ceftriaxone, and discharged on Vantin    CXR this admission: inferomedial right lung pneumonia     Plan:  See plan for sepsis

## 2024-09-26 NOTE — SEPSIS NOTE
"  Sepsis Note   Terrie Alicea 64 y.o. female MRN: [id number]  Unit/Bed#: ED-03 Encounter: [id number]       Initial Sepsis Screening       Row Name 09/25/24 2302                Is the patient's history suggestive of a new or worsening infection? Yes (Proceed)  -CL        Suspected source of infection suspect infection, source unknown  -CL        Indicate SIRS criteria Tachycardia > 90 bpm;Hyperthemia > 38.3C (100.9F) OR Hypothermia <36C (96.8F)  -CL        Are two or more of the above signs & symptoms of infection both present and new to the patient? Yes (Proceed)  -CL        Assess for evidence of organ dysfunction: Are any of the below criteria present within 6 hours of suspected infection and SIRS criteria that are NOT considered to be chronic conditions? Lactate > 2.0  -CL        Date of presentation of severe sepsis 09/25/24  -CL        Time of presentation of severe sepsis 2302  -CL        Sepsis Note: Click \"NEXT\" below (NOT \"close\") to generate sepsis note based on above information. YES (proceed by clicking \"NEXT\")  -CL                  User Key  (r) = Recorded By, (t) = Taken By, (c) = Cosigned By      Initials Name Provider Type    CL Binh Parra MD Physician                        There is no height or weight on file to calculate BMI.  Wt Readings from Last 1 Encounters:   09/23/24 39.4 kg (86 lb 14.4 oz)        Ideal body weight: 47.1 kg (103 lb 14.5 oz)    "

## 2024-09-26 NOTE — ASSESSMENT & PLAN NOTE
-Patient was brought in by EMS from rehab facility for a fever, her fever was initially 100.9F, was given Tylenol at the facility which had than dropped to 100 F  - Met SIRS on admission with Temp 102 F.    -LA: 2.3  CXR: Pneumonia inferomedial right lung  Plan:  Initiate IV antibiotic therapy with ceftriaxone and Flagyl  Follow blood cultures, sputum culture, MRSA culture  Will obtain procal, CBC, and BMP repeat in the AM  Tylenol 975 mg as needed every 6 hours for fevers   Discontinued boateng as patient does not have boateng at baseline and no longer requires  Due to recent hospitalization with ICU after reported hypotension earlier this month followed by changes to patient's baseline, CT head negative

## 2024-09-26 NOTE — ASSESSMENT & PLAN NOTE
Lab Results   Component Value Date    HGBA1C 6.2 (H) 08/13/2024       Recent Labs     09/26/24  0355   POCGLU 191*       Blood Sugar Average: Last 72 hrs:  (P) 191    Plan  Continue home regimen:  Lispro 4 units q 6hr  Lispro sliding scale

## 2024-09-26 NOTE — ASSESSMENT & PLAN NOTE
Continue home oxybutynin   Patient received covid testing at [facility].  Grace Gonzalez LPN ............... 8/30/2021, 11:37 AM

## 2024-09-26 NOTE — CASE MANAGEMENT
Case Management Assessment & Discharge Planning Note    Patient name Terrie Alicea  Location W /W -01 MRN [id number]  : 1960 Date 2024       Current Admission Date: 2024  Current Admission Diagnosis:Severe sepsis (HCC)   Patient Active Problem List    Diagnosis Date Noted Date Diagnosed    Mood disturbance 2024     Fever 09/15/2024     Mood disturbances 2024     Severe protein-calorie malnutrition (HCC) 2024     Functional quadriplegia (HCC) 2024     Protein-calorie malnutrition, unspecified severity (HCC) 2024     Hospital discharge follow-up 2024     SIRS (systemic inflammatory response syndrome) (HCC) 2024     Developmental delay      Preop examination 2023     Cataract 2023     Internal hemorrhoids 2023     Contracture of right hand 2023     Need for shingles vaccine 2023     Viral illness 2022     Dystonic cerebral palsy (HCC) 2022     Cervical dystonia 2022     Parkinson's disease 2022     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HCC) 10/18/2021     Polyneuropathy associated with underlying disease (HCC) 10/18/2021     Hypercalcemia 2021     Herpes zoster without complication 2021     Abnormal finding on lung imaging 2021     Anemia 2020     Eosinophilic leukocytosis 2020     Underweight 2020     Dysphagia 2020     History of vitamin D deficiency 2020     History of fall 10/28/2019     Breast asymmetry 2019     Hypernatremia 2018     Pneumonia 2018     Severe sepsis (HCC) 2018     Gait disturbance 2018     Osteoarthritis of both hips 2018     Severe Intellectual disability 2018     Diabetes mellitus type 2, insulin dependent (HCC) 2018     Abnormal albumin 2017     Peripheral neuropathy 2017     Seasonal allergies 2017     Physical deconditioning 2017     Hyperlipidemia 2017      Gastroesophageal reflux disease 03/27/2017     Cerebral palsy (HCC) 03/27/2017     Spasticity 03/27/2017     Ambulatory dysfunction 03/27/2017     Bipolar disorder (HCC) 03/27/2017     Hypotension 04/11/2016     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/2016     Weight loss 01/21/2016     Osteoporosis 01/19/2016     Resting tremor 01/19/2016     Acute metabolic encephalopathy 12/11/2015     Gait abnormality 12/11/2015     Platelets decreased ([facility]) 07/31/2015     Other chronic pain 07/30/2015     Postmenopausal vaginal bleeding 07/23/2015     Anxiety 07/15/2015     Menopause 09/25/2014     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/28/2014     Atopic dermatitis 06/02/2014     Dry eye 06/02/2014     Constipation 04/12/2013     Iron deficiency anemia 03/27/2013     Urinary incontinence 03/27/2013     Hypothyroidism 01/10/2013       LOS (days): 1  Geometric Mean LOS (GMLOS) (days):   Days to GMLOS:     OBJECTIVE:  PATIENT READMITTED TO HOSPITAL  Risk of Unplanned Readmission Score: 36.73         Current admission status: Inpatient       Preferred Pharmacy:   [facility] [place], [place] - [address]  [address]  [address]  [place] [address]  Phone: [phone] Fax: [phone]    [facility]  [address]  [place] [address]  Phone: [phone] Fax: [phone]    [facility] [place] - [address]  [address] [address]  Phone: [phone] Fax: [phone]    [facility] ([place] - [place] - [address]  [address]  [place] [address]  Phone: [phone] Fax: [phone]    [place], [place] - [address]  [address] [address]  Phone: [phone] Fax: [phone]    Primary Care Provider: Gunnar Sylvester DO    Primary Insurance: [employer]  REP  Secondary Insurance: [place] MEDICAL  [place]SISTANCE    ASSESSMENT:  Active Health Care Proxies       Ella Morgan  [facility] Care Representative - Other   Primary Phone: [phone] (Mobile)  Work Phone: [phone]                           Readmission Root Cause  30 Day Readmission: Yes  During your hospital stay, did someone (provider, nurse, ) explain your care to you in a way you could understand?: Yes  Did you feel medically stable to leave the hospital?: Yes  Were you able to pay for your medication at the pharmacy?: Yes  Did you have reliable transportation to take you to your appointments?: Yes  During previous admission, was a post-acute recommendation made?: Yes  What post-acute resources were offered?: STR  Patient was readmitted due to: Severe sepsis  Action Plan: IV abx    Patient Information  Admitted from:: Facility  Mental Status: Confused  During Assessment patient was accompanied by: Not accompanied during assessment  Assessment information provided by:: Other - please comment (Pt caregiver)  Primary Caregiver: Other (Comment)  Caregiver's Name:: [facility]  Caregiver's Telephone Number:: [phone]  Support Systems: Home care staff  County of Residence: [place]  What [facility] do you live in?: [place]  Home entry access options. Select all that apply.: No steps to enter home  Type of Current Residence: Group home  Is patient a ?: No    Activities of Daily Living Prior to Admission  Functional Status: Total dependent  Completes ADLs independently?: No  Level of ADL dependence: Total Dependent  Ambulates independently?: No  Level of ambulatory dependence: Total Dependent  Does patient use assisted devices?: Yes  Assisted Devices (DME) used: Wheelchair  Does the patient have a history of Short-Term Rehab?: Yes  Does patient have a history of HHC?: No  Does patient currently have HHC?: No         Patient Information Continued  Income Source: SSI/SSD  Does patient have prescription coverage?:  Yes  Does patient receive dialysis treatments?: No  Does patient have a history of substance abuse?: No  Does patient have a history of Mental Health Diagnosis?: Yes (Mood disorder, bipolar disorder)  Is patient receiving treatment for mental health?: Yes  Has patient received inpatient treatment related to mental health in the last 2 years?: Yes         Means of Transportation  Means of Transport to [facility]:: Other (Comment) (Group home transport)      Social Determinants of Health (SDOH)      Flowsheet Row Most Recent Value   Housing Stability    In the last 12 months, was there a time when you were not able to pay the mortgage or rent on time? N   In the past 12 months, how many times have you moved where you were living? 0   At any time in the past 12 months, were you homeless or living in a shelter (including now)? N   Transportation Needs    In the past 12 months, has lack of transportation kept you from medical appointments or from getting medications? no   In the past 12 months, has lack of transportation kept you from meetings, work, or from getting things needed for daily living? No   Food Insecurity    Within the past 12 months, you worried that your food would run out before you got the money to buy more. Never true   Within the past 12 months, the food you bought just didn't last and you didn't have money to get more. Never true   Utilities    In the past 12 months has the electric, gas, oil, or water company threatened to shut off services in your home? No            DISCHARGE DETAILS:    Discharge planning discussed with:: Patient caregiver  Freedom of Choice: Yes  Comments - Freedom of Choice: Return to group home  CM contacted family/caregiver?: Yes (Caregiver via phone, VM left w/ legal guardian)  Were Treatment Team discharge recommendations reviewed with patient/caregiver?: Yes  Did patient/caregiver verbalize understanding of patient care needs?: Yes  Were patient/caregiver advised of the risks  associated with not following Treatment Team discharge recommendations?: Yes    Contacts  Patient Contacts: Lisa Penny (Care Giver)  Relationship to Patient:: Other (Comment) (Group home director)  Contact Method: Phone  Phone Number: [phone]  Reason/Outcome: Discharge Planning, Emergency Contact, Continuity of Care    Requested Home Health Care         Is the patient interested in [facility] at discharge?: No    DME Referral Provided  Referral made for DME?: No    Other Referral/Resources/Interventions Provided:  Interventions: Other (Specify)  Referral Comments: Pt noted to be a 30 day readmission. CM placed call to pt legal guardian to verify dcp, VM left requesting return call back to discuss same. CM placed call to pt group home caregiver Lisa. As per Lisa, all of her staff have been trained on administering insulin -- training was completed this past Friday. Lisa reported she received all supplies for pt G tube. Lisa reported pt was supposed to dc from [facility] today, however pt was readmitted to the hospital. Lisa reported dc plan is for pt to return to group [facility] once medically stable, as insulin training has been completed. Lisa reported she will be at pt bedside shortly and requested medical update from provider -- CM sent secure chat to provider to notify of same. Lisa reported they are unable to accommodate dc over the weekend due to staffing. CM will remain available.    Discharge Destination Plan:: Group Home

## 2024-09-27 LAB
ANION GAP SERPL CALCULATED.3IONS-SCNC: 6 MMOL/L (ref 4–13)
BASE EX.OXY STD BLDV CALC-SCNC: 67.6 % (ref 60–80)
BASE EXCESS BLDV CALC-SCNC: 5.7 MMOL/L
BASOPHILS # BLD AUTO: 0.04 THOUSANDS/ΜL (ref 0–0.1)
BASOPHILS NFR BLD AUTO: 1 % (ref 0–1)
BUN SERPL-MCNC: 33 MG/DL (ref 5–25)
CALCIUM SERPL-MCNC: 8.6 MG/DL (ref 8.4–10.2)
CHLORIDE SERPL-SCNC: 100 MMOL/L (ref 96–108)
CO2 SERPL-SCNC: 32 MMOL/L (ref 21–32)
CREAT SERPL-MCNC: 0.87 MG/DL (ref 0.6–1.3)
EOSINOPHIL # BLD AUTO: 0.01 THOUSAND/ΜL (ref 0–0.61)
EOSINOPHIL NFR BLD AUTO: 0 % (ref 0–6)
ERYTHROCYTE [DISTWIDTH] IN BLOOD BY AUTOMATED COUNT: 13.4 % (ref 11.6–15.1)
GFR SERPL CREATININE-BSD FRML MDRD: 71 ML/MIN/1.73SQ M
GLUCOSE SERPL-MCNC: 135 MG/DL (ref 65–140)
GLUCOSE SERPL-MCNC: 259 MG/DL (ref 65–140)
GLUCOSE SERPL-MCNC: 261 MG/DL (ref 65–140)
GLUCOSE SERPL-MCNC: 263 MG/DL (ref 65–140)
GLUCOSE SERPL-MCNC: 82 MG/DL (ref 65–140)
HCO3 BLDV-SCNC: 30.6 MMOL/L (ref 24–30)
HCT VFR BLD AUTO: 27.3 % (ref 34.8–46.1)
HGB BLD-MCNC: 8.9 G/DL (ref 11.5–15.4)
IMM GRANULOCYTES # BLD AUTO: 0.03 THOUSAND/UL (ref 0–0.2)
IMM GRANULOCYTES NFR BLD AUTO: 1 % (ref 0–2)
LACTATE SERPL-SCNC: 1.4 MMOL/L (ref 0.5–2)
LYMPHOCYTES # BLD AUTO: 1.43 THOUSANDS/ΜL (ref 0.6–4.47)
LYMPHOCYTES NFR BLD AUTO: 22 % (ref 14–44)
MCH RBC QN AUTO: 33.7 PG (ref 26.8–34.3)
MCHC RBC AUTO-ENTMCNC: 32.6 G/DL (ref 31.4–37.4)
MCV RBC AUTO: 103 FL (ref 82–98)
MONOCYTES # BLD AUTO: 0.56 THOUSAND/ΜL (ref 0.17–1.22)
MONOCYTES NFR BLD AUTO: 9 % (ref 4–12)
MRSA NOSE QL CULT: NORMAL
NEUTROPHILS # BLD AUTO: 4.48 THOUSANDS/ΜL (ref 1.85–7.62)
NEUTS SEG NFR BLD AUTO: 67 % (ref 43–75)
NRBC BLD AUTO-RTO: 0 /100 WBCS
O2 CT BLDV-SCNC: 9.4 ML/DL
PCO2 BLDV: 46.2 MM HG (ref 42–50)
PH BLDV: 7.44 [PH] (ref 7.3–7.4)
PLATELET # BLD AUTO: 141 THOUSANDS/UL (ref 149–390)
PMV BLD AUTO: 12.1 FL (ref 8.9–12.7)
PO2 BLDV: 34.4 MM HG (ref 35–45)
POTASSIUM SERPL-SCNC: 5 MMOL/L (ref 3.5–5.3)
PROCALCITONIN SERPL-MCNC: 0.13 NG/ML
RBC # BLD AUTO: 2.64 MILLION/UL (ref 3.81–5.12)
SODIUM SERPL-SCNC: 138 MMOL/L (ref 135–147)
WBC # BLD AUTO: 6.55 THOUSAND/UL (ref 4.31–10.16)

## 2024-09-27 PROCEDURE — 84145 PROCALCITONIN (PCT): CPT

## 2024-09-27 PROCEDURE — 80048 BASIC METABOLIC PNL TOTAL CA: CPT

## 2024-09-27 PROCEDURE — 82805 BLOOD GASES W/O2 SATURATION: CPT

## 2024-09-27 PROCEDURE — 99232 SBSQ HOSP IP/OBS MODERATE 35: CPT | Performed by: INTERNAL MEDICINE

## 2024-09-27 PROCEDURE — 85025 COMPLETE CBC W/AUTO DIFF WBC: CPT

## 2024-09-27 PROCEDURE — 83605 ASSAY OF LACTIC ACID: CPT

## 2024-09-27 PROCEDURE — 82948 REAGENT STRIP/BLOOD GLUCOSE: CPT

## 2024-09-27 RX ADMIN — POLYETHYLENE GLYCOL 3350 17 G: 17 POWDER, FOR SOLUTION ORAL at 09:22

## 2024-09-27 RX ADMIN — WHITE PETROLATUM 57.7 %-MINERAL OIL 31.9 % EYE OINTMENT: at 21:03

## 2024-09-27 RX ADMIN — VALPROIC ACID 125 MG: 250 SOLUTION ORAL at 20:54

## 2024-09-27 RX ADMIN — BACLOFEN 10 MG: 10 TABLET ORAL at 20:55

## 2024-09-27 RX ADMIN — BACLOFEN 10 MG: 10 TABLET ORAL at 09:22

## 2024-09-27 RX ADMIN — BACLOFEN 10 MG: 10 TABLET ORAL at 17:18

## 2024-09-27 RX ADMIN — Medication 125 MG: at 09:24

## 2024-09-27 RX ADMIN — METRONIDAZOLE 500 MG: 500 INJECTION, SOLUTION INTRAVENOUS at 04:10

## 2024-09-27 RX ADMIN — INSULIN HUMAN 4 UNITS: 100 INJECTION, SOLUTION PARENTERAL at 17:39

## 2024-09-27 RX ADMIN — SODIUM CHLORIDE, SODIUM GLUCONATE, SODIUM ACETATE, POTASSIUM CHLORIDE, MAGNESIUM CHLORIDE, SODIUM PHOSPHATE, DIBASIC, AND POTASSIUM PHOSPHATE 100 ML/HR: .53; .5; .37; .037; .03; .012; .00082 INJECTION, SOLUTION INTRAVENOUS at 14:56

## 2024-09-27 RX ADMIN — Medication 1000 UNITS: at 09:22

## 2024-09-27 RX ADMIN — CEFTRIAXONE SODIUM 1000 MG: 10 INJECTION, POWDER, FOR SOLUTION INTRAVENOUS at 06:24

## 2024-09-27 RX ADMIN — MIDODRINE HYDROCHLORIDE 5 MG: 5 TABLET ORAL at 06:24

## 2024-09-27 RX ADMIN — KETOTIFEN FUMARATE 1 DROP: 0.25 SOLUTION OPHTHALMIC at 17:36

## 2024-09-27 RX ADMIN — ARIPIPRAZOLE 2 MG: 2 TABLET ORAL at 21:03

## 2024-09-27 RX ADMIN — MIDODRINE HYDROCHLORIDE 5 MG: 5 TABLET ORAL at 17:15

## 2024-09-27 RX ADMIN — LEVOTHYROXINE SODIUM 25 MCG: 25 TABLET ORAL at 06:24

## 2024-09-27 RX ADMIN — TRAZODONE HYDROCHLORIDE 100 MG: 100 TABLET ORAL at 21:03

## 2024-09-27 RX ADMIN — VALPROIC ACID 125 MG: 250 SOLUTION ORAL at 09:22

## 2024-09-27 RX ADMIN — KETOTIFEN FUMARATE 1 DROP: 0.25 SOLUTION OPHTHALMIC at 09:24

## 2024-09-27 RX ADMIN — LORAZEPAM 0.5 MG: 0.5 TABLET ORAL at 21:03

## 2024-09-27 RX ADMIN — CITALOPRAM HYDROBROMIDE 20 MG: 20 TABLET ORAL at 09:22

## 2024-09-27 RX ADMIN — PRAVASTATIN SODIUM 40 MG: 40 TABLET ORAL at 17:15

## 2024-09-27 RX ADMIN — INSULIN HUMAN 4 UNITS: 100 INJECTION, SOLUTION PARENTERAL at 06:45

## 2024-09-27 RX ADMIN — OXYBUTYNIN CHLORIDE 5 MG: 5 TABLET ORAL at 09:22

## 2024-09-27 RX ADMIN — ENOXAPARIN SODIUM 40 MG: 40 INJECTION SUBCUTANEOUS at 09:22

## 2024-09-27 RX ADMIN — MIDODRINE HYDROCHLORIDE 5 MG: 5 TABLET ORAL at 12:04

## 2024-09-27 RX ADMIN — INSULIN HUMAN 2 UNITS: 100 INJECTION, SOLUTION PARENTERAL at 07:43

## 2024-09-27 RX ADMIN — INSULIN HUMAN 2 UNITS: 100 INJECTION, SOLUTION PARENTERAL at 02:54

## 2024-09-27 NOTE — ASSESSMENT & PLAN NOTE
Lab Results   Component Value Date    HGB 8.9 (L) 09/27/2024    HGB 9.3 (L) 09/26/2024    HGB 9.9 (L) 09/26/2024     No active bleeding  Continue to monitor

## 2024-09-27 NOTE — PLAN OF CARE
Problem: Prexisting or High Potential for Compromised Skin Integrity  Goal: Skin integrity is maintained or improved  Description: INTERVENTIONS:  - Identify patients at risk for skin breakdown  - Assess and monitor skin integrity  - Assess and monitor nutrition and hydration status  - Monitor labs   - Assess for incontinence   - Turn and reposition patient  - Assist with mobility/ambulation  - Relieve pressure over bony prominences  - Avoid friction and shearing  - Provide appropriate hygiene as needed including keeping skin clean and dry  - Evaluate need for skin moisturizer/barrier cream  - Collaborate with interdisciplinary team   - Patient/family teaching  - Consider wound care consult   Outcome: Progressing     Problem: PAIN - ADULT  Goal: Verbalizes/displays adequate comfort level or baseline comfort level  Description: Interventions:  - Encourage patient to monitor pain and request assistance  - Assess pain using appropriate pain scale  - Administer analgesics based on type and severity of pain and evaluate response  - Implement non-pharmacological measures as appropriate and evaluate response  - Consider cultural and social influences on pain and pain management  - Notify physician/advanced practitioner if interventions unsuccessful or patient reports new pain  Outcome: Progressing     Problem: INFECTION - ADULT  Goal: Absence or prevention of progression during hospitalization  Description: INTERVENTIONS:  - Assess and monitor for signs and symptoms of infection  - Monitor lab/diagnostic results  - Monitor all insertion sites, i.e. indwelling lines, tubes, and drains  - Monitor endotracheal if appropriate and nasal secretions for changes in amount and color  - [place] appropriate cooling/warming therapies per order  - Administer medications as ordered  - Instruct and encourage patient and family to use good hand hygiene technique  - Identify and instruct in appropriate isolation precautions for  identified infection/condition  Outcome: Progressing  Goal: Absence of fever/infection during neutropenic period  Description: INTERVENTIONS:  - Monitor WBC    Outcome: Progressing     Problem: SAFETY ADULT  Goal: Patient will remain free of falls  Description: INTERVENTIONS:  - Educate patient/family on patient safety including physical limitations  - Instruct patient to call for assistance with activity   - Consult OT/PT to assist with strengthening/mobility   - Keep Call bell within reach  - Keep bed low and locked with side rails adjusted as appropriate  - Keep care items and personal belongings within reach  - Initiate and maintain comfort rounds  - Make Fall Risk Sign visible to staff  - Offer Toileting every  Hours, in advance of need  - Initiate/Maintain alarm  - Obtain necessary fall risk management equipment  - Apply yellow socks and bracelet for high fall risk patients  - Consider moving patient to room near nurses station  Outcome: Progressing  Goal: Maintain or return to baseline ADL function  Description: INTERVENTIONS:  -  Assess patient's ability to carry out ADLs; assess patient's baseline for ADL function and identify physical deficits which impact ability to perform ADLs (bathing, care of mouth/teeth, toileting, grooming, dressing, etc.)  - Assess/evaluate cause of self-care deficits   - Assess range of motion  - Assess patient's mobility; develop plan if impaired  - Assess patient's need for assistive devices and provide as appropriate  - Encourage maximum independence but intervene and supervise when necessary  - Involve family in performance of ADLs  - Assess for home care needs following discharge   - Consider OT consult to assist with ADL evaluation and planning for discharge  - Provide patient education as appropriate  Outcome: Progressing  Goal: Maintains/Returns to pre admission functional level  Description: INTERVENTIONS:  - Perform AM-PAC 6 Click Basic Mobility/ Daily Activity  assessment daily.  - Set and communicate daily mobility goal to care team and patient/family/caregiver.   - Collaborate with rehabilitation services on mobility goals if consulted  - Perform Range of Motion times a day.  - Reposition patient every  hours.  - Dangle patient  times a day  - Stand patient times a day  - Ambulate patient  times a day  - Out of bed to chair  times a day   - Out of bed for meals times a day  - Out of bed for toileting  - Record patient progress and toleration of activity level   Outcome: Progressing     Problem: DISCHARGE PLANNING  Goal: Discharge to home or other facility with appropriate resources  Description: INTERVENTIONS:  - Identify barriers to discharge w/patient and caregiver  - Arrange for needed discharge resources and transportation as appropriate  - Identify discharge learning needs (meds, wound care, etc.)  - Arrange for interpretive services to assist at discharge as needed  - Refer to Case Management Department for coordinating discharge planning if the patient needs post-hospital services based on physician/advanced practitioner order or complex needs related to functional status, cognitive ability, or social support system  Outcome: Progressing     Problem: Nutrition/Hydration-ADULT  Goal: Nutrient/Hydration intake appropriate for improving, restoring or maintaining nutritional needs  Description: Monitor and assess patient's nutrition/hydration status for malnutrition. Collaborate with interdisciplinary team and initiate plan and interventions as ordered.  Monitor patient's weight and dietary intake as ordered or per policy. Utilize nutrition screening tool and intervene as necessary. Determine patient's food preferences and provide high-protein, high-caloric foods as appropriate.     INTERVENTIONS:  - Monitor oral intake, urinary output, labs, and treatment plans  - Assess nutrition and hydration status and recommend course of action  - Evaluate amount of meals  eaten  - Assist patient with eating if necessary   - Allow adequate time for meals  - Recommend/ encourage appropriate diets, oral nutritional supplements, and vitamin/mineral supplements  - Order, calculate, and assess calorie counts as needed  - Recommend, monitor, and adjust tube feedings and TPN/PPN based on assessed needs  - Assess need for intravenous fluids  - Provide specific nutrition/hydration education as appropriate  - Include patient/family/caregiver in decisions related to nutrition  Outcome: Progressing

## 2024-09-27 NOTE — ASSESSMENT & PLAN NOTE
-Patient was brought in by EMS from rehab facility for a fever, her fever was initially 100.9F, was given Tylenol at the facility which had than dropped to 100 F  - Met SIRS on admission with Temp 102 F.    -LA: 2.3  CXR: Pneumonia inferomedial right lung    Plan:  Continue IV ceftriaxone.  Discontinue Flagyl  Follow blood cultures, sputum culture, MRSA culture  Trend CBC, and BMP repeat in the AM  Tylenol 975 mg as needed every 6 hours for fevers

## 2024-09-27 NOTE — ASSESSMENT & PLAN NOTE
- Patient was brought in by EMS from rehab facility for a fever, her fever was initially 100.9F, was given Tylenol at the facility which had than dropped to 100 F  - Met SIRS on admission with Temp 102 F.    - LA: 2.3  CXR: Pneumonia inferomedial right lung  Due to recent hospitalization with ICU after reported hypotension earlier this month followed by changes to patient's baseline, CT head negative   Was febrile overnight to Tmax 102.5 F    Plan:  Continue IV ceftriaxone to complete 5-day course.  Discontinue Flagyl  Follow blood cultures, sputum culture, MRSA culture  Trend CBC, and BMP repeat in the AM  Tylenol 975 mg as needed every 6 hours for fevers

## 2024-09-27 NOTE — PLAN OF CARE
Problem: PAIN - ADULT  Goal: Verbalizes/displays adequate comfort level or baseline comfort level  Description: Interventions:  - Encourage patient to monitor pain and request assistance  - Assess pain using appropriate pain scale  - Administer analgesics based on type and severity of pain and evaluate response  - Implement non-pharmacological measures as appropriate and evaluate response  - Consider cultural and social influences on pain and pain management  - Notify physician/advanced practitioner if interventions unsuccessful or patient reports new pain  Outcome: Progressing     Problem: SAFETY ADULT  Goal: Patient will remain free of falls  Description: INTERVENTIONS:  - Educate patient/family on patient safety including physical limitations  - Instruct patient to call for assistance with activity   - Consult OT/PT to assist with strengthening/mobility   - Keep Call bell within reach  - Keep bed low and locked with side rails adjusted as appropriate  - Keep care items and personal belongings within reach  - Initiate and maintain comfort rounds  - Make Fall Risk Sign visible to staff  - Offer Toileting every  Hours, in advance of need  - Initiate/Maintain alarm  - Obtain necessary fall risk management equipment  - Apply yellow socks and bracelet for high fall risk patients  - Consider moving patient to room near nurses station  Outcome: Progressing

## 2024-09-27 NOTE — PROGRESS NOTES
Progress Note - Hospitalist   Name: Terrie Alicea 64 y.o. female I MRN: [id number]  Unit/Bed#: W -01 I Date of Admission: 9/25/2024   Date of Service: 9/27/2024 I Hospital Day: 2    Assessment & Plan  Severe sepsis (HCC)  - Patient was brought in by EMS from rehab facility for a fever, her fever was initially 100.9F, was given Tylenol at the facility which had than dropped to 100 F  - Met SIRS on admission with Temp 102 F.    - LA: 2.3  CXR: Pneumonia inferomedial right lung  Due to recent hospitalization with ICU after reported hypotension earlier this month followed by changes to patient's baseline, CT head negative   Was febrile overnight to Tmax 102.5 F    Plan:  Continue IV ceftriaxone to complete 5-day course.  Discontinue Flagyl  Follow blood cultures, sputum culture, MRSA culture  Trend CBC, and BMP repeat in the AM  Tylenol 975 mg as needed every 6 hours for fevers     Pneumonia  -She was recently admitted for aspiration pneumonia in August treated with ceftriaxone, and discharged on Vantin  CXR this admission: inferomedial right lung pneumonia    Plan:  See plan for sepsis   Diabetes mellitus type 2, insulin dependent ([facility])  Lab Results   Component Value Date    HGBA1C 6.2 (H) 08/13/2024     Recent Labs     09/26/24  2326 09/27/24  0247 09/27/24  0635 09/27/24  1145   POCGLU 281* 261* 259* 82     Blood Sugar Average: Last 72 hrs:  (P) 194.25  Continue home regular insulin 4 units every 6 hours  Regular insulin sliding scale every 6 hours   Nutrition consulted, appreciate recommendations in regards to tube feeds  Parkinson's disease ([facility])  Continue home Sinemet  Urinary incontinence  Continue home oxybutynin  Hypotension  Continue home midodrine   Hypothyroidism  Continue home levothyroxine   Mood disturbance  Continue home abilify, citalopram, and trazodone   Platelets decreased ([facility])  Lab Results   Component Value Date     (L) 09/27/2024     09/26/2024     09/26/2024      No active bleeding  Continue to monitor  Anemia  Lab Results   Component Value Date    HGB 8.9 (L) 2024    HGB 9.3 (L) 2024    HGB 9.9 (L) 2024     No active bleeding  Continue to monitor    VTE Pharmacologic Prophylaxis: VTE Score: 6 High Risk (Score >/= 5) - Pharmacological DVT Prophylaxis Ordered: enoxaparin (Lovenox). Sequential Compression Devices Ordered.    Mobility:   Basic Mobility Inpatient Raw Score: 6  -HLM Goal: 2: Bed activities/Dependent transfer  JH-HLM Achieved: 1: Laying in bed  JH-HLM Goal NOT achieved. Continue with multidisciplinary rounding and encourage appropriate mobility to improve upon -HLM goals.    Patient Centered Rounds: I performed bedside rounds with nursing staff today.   Discussions with Specialists or Other Care Team Provider: none    Education and Discussions with Family / Patient: Updated  (caregiver Lisa) via phone.    Current Length of Stay: 2 day(s)  Current Patient Status: Inpatient   Certification Statement: The patient will continue to require additional inpatient hospital stay due to pneumonia and labile blood sugars  Discharge Plan: Anticipate discharge in 24-48 hrs to group home.    Code Status: Level 1 - Full Code    Subjective   Yesterday afternoon, patient had fever Tmax 102.5 F with tachycardia, tachypnea, labored breathing around 4 pm.  Repeat sepsis workup was unremarkable.  She received Tylenol after which fever broke.  She has not had a fever since this episode yesterday afternoon.  She also had hypotensive episodes, which self resolved.  Overnight, she was also retaining urine.  She had to be straight cathed once after which she was able to urinate on her own.  Patient was seen and examined at bedside.  She is awake and not in any distress.  She is not interactive and noncommunicative at baseline.    Objective     Vitals:   Temp (24hrs), Av.9 °F (37.7 °C), Min:98.4 °F (36.9 °C), Max:102.5 °F (39.2 °C)    Temp:   [98.4 °F (36.9 °C)-102.5 °F (39.2 °C)] 99.5 °F (37.5 °C)  HR:  [] 82  Resp:  [18-50] 18  BP: ()/(55-86) 121/67  SpO2:  [96 %-100 %] 97 %  Body mass index is 20.55 kg/m².     Input and Output Summary (last 24 hours):     Intake/Output Summary (Last 24 hours) at 9/27/2024 1432  Last data filed at 9/27/2024 1036  Gross per 24 hour   Intake 125 ml   Output 559 ml   Net -434 ml       Physical Exam  Constitutional:       General: She is not in acute distress.     Appearance: She is ill-appearing.   HENT:      Head: Normocephalic and atraumatic.      Mouth/Throat:      Mouth: Mucous membranes are dry.   Cardiovascular:      Rate and Rhythm: Normal rate and regular rhythm.      Heart sounds: No murmur heard.  Pulmonary:      Breath sounds: No wheezing, rhonchi or rales.   Abdominal:      General: Bowel sounds are normal.      Palpations: Abdomen is soft.      Tenderness: There is no abdominal tenderness. There is no guarding or rebound.      Comments: PEG tube in place, no drainage or erythema noted   Musculoskeletal:      Right lower leg: No edema.      Left lower leg: No edema.      Comments: BUE and BLE contracted at baseline   Neurological:      Mental Status: Mental status is at baseline.      Comments: Awake, opens eyes.  Noncommunicative, not interactive at baseline.       Lines/Drains:  Lines/Drains/Airways       Active Status       Name Placement date Placement time Site Days    Gastrostomy/Enterostomy  16 Fr. LUQ 08/26/24  1643  LUQ  31                      Lab Results: I have reviewed the following results:    Results from last 7 days   Lab Units 09/27/24  0533   WBC Thousand/uL 6.55   HEMOGLOBIN g/dL 8.9*   HEMATOCRIT % 27.3*   PLATELETS Thousands/uL 141*   SEGS PCT % 67   LYMPHO PCT % 22   MONO PCT % 9   EOS PCT % 0     Results from last 7 days   Lab Units 09/27/24  0533 09/26/24  0451 09/25/24  2226   SODIUM mmol/L 138   < > 139   POTASSIUM mmol/L 5.0   < > 4.3   CHLORIDE mmol/L 100   < > 97   CO2  mmol/L 32   < > 35*   BUN mg/dL 33*   < > 34*   CREATININE mg/dL 0.87   < > 1.14   ANION GAP mmol/L 6   < > 7   CALCIUM mg/dL 8.6   < > 10.5*   ALBUMIN g/dL  --   --  3.9   TOTAL BILIRUBIN mg/dL  --   --  0.43   ALK PHOS U/L  --   --  45   ALT U/L  --   --  22   AST U/L  --   --  23   GLUCOSE RANDOM mg/dL 263*   < > 178*    < > = values in this interval not displayed.     Results from last 7 days   Lab Units 09/25/24  2226   INR  0.92     Results from last 7 days   Lab Units 09/27/24  1145 09/27/24  0635 09/27/24  0247 09/26/24  2326 09/26/24  2036 09/26/24  1857 09/26/24  1729 09/26/24  1555 09/26/24  1129 09/26/24  0928 09/26/24  0752 09/26/24  0355   POC GLUCOSE mg/dl 82 259* 261* 281* 167* 128 59* 77 309* 326* 191* 191*         Results from last 7 days   Lab Units 09/27/24  0533 09/26/24  2139 09/26/24  0451 09/26/24  0201 09/25/24  2226   LACTIC ACID mmol/L 1.4 1.0  --  1.5 2.3*   PROCALCITONIN ng/ml 0.13  --  0.08  --  0.09       Recent Cultures (last 7 days):   Results from last 7 days   Lab Units 09/25/24  2235 09/25/24  2226   BLOOD CULTURE  No Growth at 24 hrs. No Growth at 24 hrs.       Imaging Review: Reviewed radiology reports from this admission including: chest xray and CT head.  Other Studies: EKG was reviewed.     Last 24 Hours Medication List:     Current Facility-Administered Medications:     acetaminophen (TYLENOL) tablet 975 mg, Q6H PRN    ARIPiprazole (ABILIFY) tablet 2 mg, HS    artificial tear ophthalmic ointment, HS    ascorbic acid (VITAMIN C) tablet 125 mg, Daily    baclofen tablet 10 mg, TID    calcium carbonate (TUMS) chewable tablet 500 mg, BID PRN    carbidopa-levodopa (SINEMET CR)  mg per ER tablet 2 tablet, BID    ceftriaxone (ROCEPHIN) 1 g/50 mL in dextrose IVPB, Q24H, Last Rate: 1,000 mg (09/27/24 0624)    Cholecalciferol (VITAMIN D3) tablet 1,000 Units, Daily    citalopram (CeleXA) tablet 20 mg, Daily    enoxaparin (LOVENOX) subcutaneous injection 40 mg, Daily    Ferrous  Sulfate oral syrup 300 mg, Every Other Day    guaiFENesin (ROBITUSSIN) oral liquid 200 mg, 4x Daily PRN    insulin regular (HumuLIN R,NovoLIN R) injection 1-5 Units, Q6H    insulin regular (HumuLIN R,NovoLIN R) injection 4 Units, Q6H    Ketotifen Fumarate (ZADITOR) 0.035 % ophthalmic solution 1 drop, BID    levothyroxine tablet 25 mcg, Early Morning    LORazepam (ATIVAN) tablet 0.5 mg, HS    midodrine (PROAMATINE) tablet 5 mg, TID AC    multi-electrolyte (PLASMALYTE-A/ISOLYTE-S PH 7.4) IV solution, Continuous, Last Rate: 100 mL/hr (09/26/24 1667)    oxybutynin (DITROPAN) tablet 5 mg, Daily    polyethylene glycol (MIRALAX) packet 17 g, Daily    pravastatin (PRAVACHOL) tablet 40 mg, Daily With Dinner    traZODone (DESYREL) tablet 100 mg, HS    valproic acid (DEPAKENE) oral soln 125 mg, Q12H ROSEMARY    Administrative Statements   Today, Patient Was Seen By: Yolanda Kay DO      **Please Note: This note may have been constructed using a voice recognition system.**

## 2024-09-27 NOTE — ASSESSMENT & PLAN NOTE
Lab Results   Component Value Date     (L) 09/27/2024     09/26/2024     09/26/2024     No active bleeding  Continue to monitor

## 2024-09-27 NOTE — ASSESSMENT & PLAN NOTE
Lab Results   Component Value Date    HGBA1C 6.2 (H) 08/13/2024     Recent Labs     09/26/24  2326 09/27/24  0247 09/27/24  0635 09/27/24  1145   POCGLU 281* 261* 259* 82     Blood Sugar Average: Last 72 hrs:  (P) 194.25  Continue home regular insulin 4 units every 6 hours  Regular insulin sliding scale every 6 hours   Nutrition consulted, appreciate recommendations in regards to tube feeds

## 2024-09-28 LAB
ANION GAP SERPL CALCULATED.3IONS-SCNC: 4 MMOL/L (ref 4–13)
BASOPHILS # BLD AUTO: 0.05 THOUSANDS/ΜL (ref 0–0.1)
BASOPHILS NFR BLD AUTO: 1 % (ref 0–1)
BUN SERPL-MCNC: 30 MG/DL (ref 5–25)
CALCIUM SERPL-MCNC: 8.5 MG/DL (ref 8.4–10.2)
CHLORIDE SERPL-SCNC: 104 MMOL/L (ref 96–108)
CO2 SERPL-SCNC: 32 MMOL/L (ref 21–32)
CREAT SERPL-MCNC: 0.89 MG/DL (ref 0.6–1.3)
EOSINOPHIL # BLD AUTO: 0.26 THOUSAND/ΜL (ref 0–0.61)
EOSINOPHIL NFR BLD AUTO: 4 % (ref 0–6)
ERYTHROCYTE [DISTWIDTH] IN BLOOD BY AUTOMATED COUNT: 13.8 % (ref 11.6–15.1)
GFR SERPL CREATININE-BSD FRML MDRD: 69 ML/MIN/1.73SQ M
GLUCOSE SERPL-MCNC: 119 MG/DL (ref 65–140)
GLUCOSE SERPL-MCNC: 120 MG/DL (ref 65–140)
GLUCOSE SERPL-MCNC: 121 MG/DL (ref 65–140)
GLUCOSE SERPL-MCNC: 136 MG/DL (ref 65–140)
GLUCOSE SERPL-MCNC: 158 MG/DL (ref 65–140)
GLUCOSE SERPL-MCNC: 161 MG/DL (ref 65–140)
HCT VFR BLD AUTO: 29.7 % (ref 34.8–46.1)
HGB BLD-MCNC: 9.3 G/DL (ref 11.5–15.4)
IMM GRANULOCYTES # BLD AUTO: 0.04 THOUSAND/UL (ref 0–0.2)
IMM GRANULOCYTES NFR BLD AUTO: 1 % (ref 0–2)
LYMPHOCYTES # BLD AUTO: 1.43 THOUSANDS/ΜL (ref 0.6–4.47)
LYMPHOCYTES NFR BLD AUTO: 20 % (ref 14–44)
MCH RBC QN AUTO: 33.1 PG (ref 26.8–34.3)
MCHC RBC AUTO-ENTMCNC: 31.3 G/DL (ref 31.4–37.4)
MCV RBC AUTO: 106 FL (ref 82–98)
MONOCYTES # BLD AUTO: 0.56 THOUSAND/ΜL (ref 0.17–1.22)
MONOCYTES NFR BLD AUTO: 8 % (ref 4–12)
NEUTROPHILS # BLD AUTO: 4.88 THOUSANDS/ΜL (ref 1.85–7.62)
NEUTS SEG NFR BLD AUTO: 66 % (ref 43–75)
NRBC BLD AUTO-RTO: 0 /100 WBCS
PLATELET # BLD AUTO: 161 THOUSANDS/UL (ref 149–390)
PMV BLD AUTO: 12.7 FL (ref 8.9–12.7)
POTASSIUM SERPL-SCNC: 4.8 MMOL/L (ref 3.5–5.3)
RBC # BLD AUTO: 2.81 MILLION/UL (ref 3.81–5.12)
SODIUM SERPL-SCNC: 140 MMOL/L (ref 135–147)
WBC # BLD AUTO: 7.22 THOUSAND/UL (ref 4.31–10.16)

## 2024-09-28 PROCEDURE — 99232 SBSQ HOSP IP/OBS MODERATE 35: CPT | Performed by: INTERNAL MEDICINE

## 2024-09-28 PROCEDURE — 80048 BASIC METABOLIC PNL TOTAL CA: CPT

## 2024-09-28 PROCEDURE — 82948 REAGENT STRIP/BLOOD GLUCOSE: CPT

## 2024-09-28 PROCEDURE — 85025 COMPLETE CBC W/AUTO DIFF WBC: CPT

## 2024-09-28 RX ORDER — CARBIDOPA AND LEVODOPA 25; 100 MG/1; MG/1
2 TABLET ORAL 2 TIMES DAILY
Status: DISCONTINUED | OUTPATIENT
Start: 2024-09-28 | End: 2024-10-01 | Stop reason: HOSPADM

## 2024-09-28 RX ADMIN — BACLOFEN 10 MG: 10 TABLET ORAL at 21:42

## 2024-09-28 RX ADMIN — INSULIN HUMAN 4 UNITS: 100 INJECTION, SOLUTION PARENTERAL at 00:56

## 2024-09-28 RX ADMIN — CITALOPRAM HYDROBROMIDE 20 MG: 20 TABLET ORAL at 09:18

## 2024-09-28 RX ADMIN — CARBIDOPA AND LEVODOPA 2 TABLET: 25; 100 TABLET ORAL at 21:42

## 2024-09-28 RX ADMIN — INSULIN HUMAN 4 UNITS: 100 INJECTION, SOLUTION PARENTERAL at 19:03

## 2024-09-28 RX ADMIN — MIDODRINE HYDROCHLORIDE 5 MG: 5 TABLET ORAL at 17:07

## 2024-09-28 RX ADMIN — BACLOFEN 10 MG: 10 TABLET ORAL at 17:07

## 2024-09-28 RX ADMIN — KETOTIFEN FUMARATE 1 DROP: 0.25 SOLUTION OPHTHALMIC at 17:08

## 2024-09-28 RX ADMIN — ARIPIPRAZOLE 2 MG: 2 TABLET ORAL at 21:42

## 2024-09-28 RX ADMIN — TRAZODONE HYDROCHLORIDE 100 MG: 100 TABLET ORAL at 21:42

## 2024-09-28 RX ADMIN — FERROUS SULFATE 300 MG: 300 SOLUTION ORAL at 09:17

## 2024-09-28 RX ADMIN — INSULIN HUMAN 1 UNITS: 100 INJECTION, SOLUTION PARENTERAL at 05:59

## 2024-09-28 RX ADMIN — CEFTRIAXONE SODIUM 1000 MG: 10 INJECTION, POWDER, FOR SOLUTION INTRAVENOUS at 06:07

## 2024-09-28 RX ADMIN — Medication 1000 UNITS: at 09:18

## 2024-09-28 RX ADMIN — Medication 125 MG: at 09:19

## 2024-09-28 RX ADMIN — LEVOTHYROXINE SODIUM 25 MCG: 25 TABLET ORAL at 05:47

## 2024-09-28 RX ADMIN — WHITE PETROLATUM 57.7 %-MINERAL OIL 31.9 % EYE OINTMENT: at 21:45

## 2024-09-28 RX ADMIN — INSULIN HUMAN 4 UNITS: 100 INJECTION, SOLUTION PARENTERAL at 05:58

## 2024-09-28 RX ADMIN — INSULIN HUMAN 1 UNITS: 100 INJECTION, SOLUTION PARENTERAL at 00:58

## 2024-09-28 RX ADMIN — ENOXAPARIN SODIUM 40 MG: 40 INJECTION SUBCUTANEOUS at 09:21

## 2024-09-28 RX ADMIN — BACLOFEN 10 MG: 10 TABLET ORAL at 09:18

## 2024-09-28 RX ADMIN — MIDODRINE HYDROCHLORIDE 5 MG: 5 TABLET ORAL at 11:54

## 2024-09-28 RX ADMIN — CARBIDOPA AND LEVODOPA 2 TABLET: 25; 100 TABLET ORAL at 11:30

## 2024-09-28 RX ADMIN — VALPROIC ACID 125 MG: 250 SOLUTION ORAL at 09:21

## 2024-09-28 RX ADMIN — VALPROIC ACID 125 MG: 250 SOLUTION ORAL at 21:42

## 2024-09-28 RX ADMIN — OXYBUTYNIN CHLORIDE 5 MG: 5 TABLET ORAL at 09:17

## 2024-09-28 RX ADMIN — MIDODRINE HYDROCHLORIDE 5 MG: 5 TABLET ORAL at 06:00

## 2024-09-28 RX ADMIN — LORAZEPAM 0.5 MG: 0.5 TABLET ORAL at 21:42

## 2024-09-28 RX ADMIN — PRAVASTATIN SODIUM 40 MG: 40 TABLET ORAL at 17:07

## 2024-09-28 RX ADMIN — KETOTIFEN FUMARATE 1 DROP: 0.25 SOLUTION OPHTHALMIC at 09:19

## 2024-09-28 NOTE — ASSESSMENT & PLAN NOTE
Lab Results   Component Value Date     09/28/2024     (L) 09/27/2024     09/26/2024     No active bleeding  Continue to monitor

## 2024-09-28 NOTE — HOSPITAL COURSE
63 yo F PMH cerebral palsy, Parkinson's Disease, T2DM, GERD, and intellectual disability who presented on 9/25/24 for evaluation of fever. Patient comes from a group home. She underwent PEG placement previously on 8/26/24 due to concern for dysphagia. Patient is nonverbal at baseline. Initial lactic acid was 2.3. Chest x-ray suggested pneumonia in the inferomedial right lung. Caregivers concurred that patient was having a moist cough starting 9/23. Patient met sepsis criteria with presumed source from pneumonia, in the setting of possible aspiration. She received a dose of cefepime and was started on ceftriaxone for total treatment course of five days. CT head was obtained due to caregiver reporting change in alertness ever since hypotension episodes requiring ICU stay. Findings did not show any acute intracranial abnormality to explain those findings. Blood cultures showed no growth, MRSA culture was negative, and COVID/Flu/RSV panel were negative. At her group home, she receives tube feeds from 6pm-6am, there was a concern for noon-time hypoglycemia so her regular insulin was adjusted to q8hr from q6hr with SSI. Patient was febrile with Tmax 102.8 F on 9/26, lactic acid later that day normalized to 1.0. Her fevers resolved and she did not require any additional PRN anti-pyretics.

## 2024-09-28 NOTE — ASSESSMENT & PLAN NOTE
Lab Results   Component Value Date    HGB 9.3 (L) 09/28/2024    HGB 8.9 (L) 09/27/2024    HGB 9.3 (L) 09/26/2024     No active bleeding  Continue to monitor

## 2024-09-28 NOTE — PLAN OF CARE
Problem: Prexisting or High Potential for Compromised Skin Integrity  Goal: Skin integrity is maintained or improved  Description: INTERVENTIONS:  - Identify patients at risk for skin breakdown  - Assess and monitor skin integrity  - Assess and monitor nutrition and hydration status  - Monitor labs   - Assess for incontinence   - Turn and reposition patient  - Assist with mobility/ambulation  - Relieve pressure over bony prominences  - Avoid friction and shearing  - Provide appropriate hygiene as needed including keeping skin clean and dry  - Evaluate need for skin moisturizer/barrier cream  - Collaborate with interdisciplinary team   - Patient/family teaching  - Consider wound care consult   Outcome: Progressing     Problem: PAIN - ADULT  Goal: Verbalizes/displays adequate comfort level or baseline comfort level  Description: Interventions:  - Encourage patient to monitor pain and request assistance  - Assess pain using appropriate pain scale  - Administer analgesics based on type and severity of pain and evaluate response  - Implement non-pharmacological measures as appropriate and evaluate response  - Consider cultural and social influences on pain and pain management  - Notify physician/advanced practitioner if interventions unsuccessful or patient reports new pain  Outcome: Progressing     Problem: INFECTION - ADULT  Goal: Absence or prevention of progression during hospitalization  Description: INTERVENTIONS:  - Assess and monitor for signs and symptoms of infection  - Monitor lab/diagnostic results  - Monitor all insertion sites, i.e. indwelling lines, tubes, and drains  - Monitor endotracheal if appropriate and nasal secretions for changes in amount and color  - [place] appropriate cooling/warming therapies per order  - Administer medications as ordered  - Instruct and encourage patient and family to use good hand hygiene technique  - Identify and instruct in appropriate isolation precautions for  identified infection/condition  Outcome: Progressing  Goal: Absence of fever/infection during neutropenic period  Description: INTERVENTIONS:  - Monitor WBC    Outcome: Progressing     Problem: SAFETY ADULT  Goal: Patient will remain free of falls  Description: INTERVENTIONS:  - Educate patient/family on patient safety including physical limitations  - Instruct patient to call for assistance with activity   - Consult OT/PT to assist with strengthening/mobility   - Keep Call bell within reach  - Keep bed low and locked with side rails adjusted as appropriate  - Keep care items and personal belongings within reach  - Initiate and maintain comfort rounds  - Make Fall Risk Sign visible to staff  - Offer Toileting every  Hours, in advance of need  - Initiate/Maintain alarm  - Obtain necessary fall risk management equipment  - Apply yellow socks and bracelet for high fall risk patients  - Consider moving patient to room near nurses station  Outcome: Progressing  Goal: Maintain or return to baseline ADL function  Description: INTERVENTIONS:  -  Assess patient's ability to carry out ADLs; assess patient's baseline for ADL function and identify physical deficits which impact ability to perform ADLs (bathing, care of mouth/teeth, toileting, grooming, dressing, etc.)  - Assess/evaluate cause of self-care deficits   - Assess range of motion  - Assess patient's mobility; develop plan if impaired  - Assess patient's need for assistive devices and provide as appropriate  - Encourage maximum independence but intervene and supervise when necessary  - Involve family in performance of ADLs  - Assess for home care needs following discharge   - Consider OT consult to assist with ADL evaluation and planning for discharge  - Provide patient education as appropriate  Outcome: Progressing  Goal: Maintains/Returns to pre admission functional level  Description: INTERVENTIONS:  - Perform AM-PAC 6 Click Basic Mobility/ Daily Activity  assessment daily.  - Set and communicate daily mobility goal to care team and patient/family/caregiver.   - Collaborate with rehabilitation services on mobility goals if consulted  - Perform Range of Motion times a day.  - Reposition patient every  hours.  - Dangle patient  times a day  - Stand patient times a day  - Ambulate patient  times a day  - Out of bed to chair  times a day   - Out of bed for meals times a day  - Out of bed for toileting  - Record patient progress and toleration of activity level   Outcome: Progressing

## 2024-09-28 NOTE — ASSESSMENT & PLAN NOTE
Lab Results   Component Value Date    HGBA1C 6.2 (H) 08/13/2024     Recent Labs     09/27/24  1145 09/27/24  1722 09/28/24  0027 09/28/24  0549   POCGLU 82 135 161* 158*     Blood Sugar Average: Last 72 hrs:  (P) 185.5174361228480243  Was receiving home regular insulin 4 units every 6 hours. Tube feeds run 12 hours (6pm-6am). Noon doses previously held due to hypoglycemia. Can trial 4 units regular insulin q 8 hr with SSI  Nutrition consulted, appreciate recommendations in regards to tube feeds

## 2024-09-28 NOTE — ASSESSMENT & PLAN NOTE
At baseline is non verbal , totally dependent on care, resides in group hoe   Continue baclofen 10 mg TID for spacticity

## 2024-09-28 NOTE — PROGRESS NOTES
Progress Note - Hospitalist   Name: Terrie Alicea 64 y.o. female I MRN: [id number]  Unit/Bed#: W -01 I Date of Admission: 9/25/2024   Date of Service: 9/28/2024 I Hospital Day: 3    Assessment & Plan  Severe sepsis (HCC)  RESOLVED. Patient was brought in by EMS from rehab facility for a fever, her fever was initially 100.9F, was given Tylenol at the facility which had than dropped to 100 F  - Met SIRS on admission with Temp 102 F.    - LA: 2.3, repeat 9/28 1.4  CXR: Pneumonia inferomedial right lung  Due to recent hospitalization with ICU after reported hypotension earlier this month followed by changes to patient's baseline, CT head negative   Was febrile 9/26 to Tmax 102.5 F    Afebrile in past 24 hours. Hypotensive at 88/56 although MAP >65 around 1530 yesterday--repeat manually a minute later was 114/82    Plan:  Continue IV ceftriaxone to complete 5-day course (today is DAY 4 of abx).  Discontinued Flagyl  Patient stable for discharge today, however Lisa from her group home does not have staff this weekend to facilitate that.  Plan for likely discharge on Monday pending she remains clinically stable.  Follow blood cultures (no growth so far)  MRSA culture negative  Trend CBC, and BMP repeat in the AM  Tylenol 975 mg as needed every 6 hours for fevers     Pneumonia  -She was recently admitted for aspiration pneumonia in August treated with ceftriaxone, and discharged on Vantin  CXR this admission: inferomedial right lung pneumonia. No signs of abscess or putrid smelling breath    Plan:  See plan for sepsis   Diabetes mellitus type 2, insulin dependent (HCC)  Lab Results   Component Value Date    HGBA1C 6.2 (H) 08/13/2024     Recent Labs     09/27/24  1145 09/27/24  1722 09/28/24  0027 09/28/24  0549   POCGLU 82 135 161* 158*     Blood Sugar Average: Last 72 hrs:  (P) 185.6814243472902632  Was receiving home regular insulin 4 units every 6 hours. Tube feeds run 12 hours (6pm-6am). Noon doses  previously held due to hypoglycemia. Can trial 4 units regular insulin q 8 hr with SSI  Nutrition consulted, appreciate recommendations in regards to tube feeds  Parkinson's disease (HCC)  Continue home Sinemet  Urinary incontinence  Continue home oxybutynin  Hypotension  Continue home midodrine   Hypothyroidism  Continue home levothyroxine   Mood disturbance  Continue home abilify, citalopram, and trazodone   Platelets decreased (HCC)  Lab Results   Component Value Date     09/28/2024     (L) 09/27/2024     09/26/2024     No active bleeding  Continue to monitor  Anemia  Lab Results   Component Value Date    HGB 9.3 (L) 09/28/2024    HGB 8.9 (L) 09/27/2024    HGB 9.3 (L) 09/26/2024     No active bleeding  Continue to monitor      VTE Pharmacologic Prophylaxis: VTE Score: 6 lovenox    Mobility:   Basic Mobility Inpatient Raw Score: 6  JH-HLM Goal: 2: Bed activities/Dependent transfer  JH-HLM Achieved: 2: Bed activities/Dependent transfer  JH-HLM Goal NOT achieved. Continue with multidisciplinary rounding and encourage appropriate mobility to improve upon JH-HLM goals.    Patient Centered Rounds: I performed bedside rounds with nursing staff today.   Discussions with Specialists or Other Care Team Provider: none    Education and Discussions with Family / Patient: Spoke to patient's POA (Ezra) in addition to her caregiver (Lisa) and provided updates on plan of care. See separate quick note.  Results of CT head showing no acute abnormalities were discussed.    Current Length of Stay: 3 day(s)  Current Patient Status: Inpatient   Certification Statement: The patient will continue to require additional inpatient hospital stay due to pneumonia  Discharge Plan: Anticipate discharge in 24-48 hrs to group home.    Code Status: Level 1 - Full Code    Subjective     Patient noted to be hypotensive yesterday afternoon at 88/56 however repeat manual BP was 114/82. She has been afebrile and has not  required tylenol PRN. Today is day 4 of antibiotics. She was not interactive today, but was opening her eyes spontaneously to my voice. She is receiving cyclic tube feeds.       Objective     Vitals:   Temp (24hrs), Av.5 °F (36.9 °C), Min:97.1 °F (36.2 °C), Max:99.5 °F (37.5 °C)    Temp:  [97.1 °F (36.2 °C)-99.5 °F (37.5 °C)] 98.7 °F (37.1 °C)  HR:  [66-71] 70  Resp:  [16-18] 18  BP: ()/(56-82) 119/60  SpO2:  [98 %-99 %] 98 %  Body mass index is 20.55 kg/m².     Input and Output Summary (last 24 hours):     Intake/Output Summary (Last 24 hours) at 2024 0835  Last data filed at 2024 1801  Gross per 24 hour   Intake 250 ml   Output --   Net 250 ml       Physical Exam  HENT:      Head: Normocephalic.      Mouth/Throat:      Mouth: Mucous membranes are dry.   Eyes:      Pupils: Pupils are equal, round, and reactive to light.      Comments: Clear discharge in both eyes   Cardiovascular:      Rate and Rhythm: Normal rate and regular rhythm.   Pulmonary:      Effort: Pulmonary effort is normal. No respiratory distress.      Breath sounds: Normal breath sounds.      Comments: Saturating well on room air  Abdominal:      Palpations: Abdomen is soft.      Tenderness: There is no abdominal tenderness. There is no guarding.      Comments: PEG tube in place, no erythema or drainage   Musculoskeletal:      Right lower leg: No edema.      Left lower leg: No edema.   Skin:     General: Skin is warm and dry.   Neurological:      Mental Status: Mental status is at baseline.      Comments: Upper and lower extremity contractures. Blink to threat in tact bilaterally          Lines/Drains:  Lines/Drains/Airways       Active Status       Name Placement date Placement time Site Days    Gastrostomy/Enterostomy  16 Fr. LUQ 24  1643  LUQ  32                            Lab Results: I have reviewed the following results: CBC/BMP:   .     24  0530   WBC 7.22   HGB 9.3*   HCT 29.7*      SODIUM 140   K 4.8       CO2 32   BUN 30*   CREATININE 0.89   GLUC 136       Results from last 7 days   Lab Units 09/28/24  0530   WBC Thousand/uL 7.22   HEMOGLOBIN g/dL 9.3*   HEMATOCRIT % 29.7*   PLATELETS Thousands/uL 161   SEGS PCT % 66   LYMPHO PCT % 20   MONO PCT % 8   EOS PCT % 4     Results from last 7 days   Lab Units 09/28/24  0530 09/26/24  0451 09/25/24  2226   SODIUM mmol/L 140   < > 139   POTASSIUM mmol/L 4.8   < > 4.3   CHLORIDE mmol/L 104   < > 97   CO2 mmol/L 32   < > 35*   BUN mg/dL 30*   < > 34*   CREATININE mg/dL 0.89   < > 1.14   ANION GAP mmol/L 4   < > 7   CALCIUM mg/dL 8.5   < > 10.5*   ALBUMIN g/dL  --   --  3.9   TOTAL BILIRUBIN mg/dL  --   --  0.43   ALK PHOS U/L  --   --  45   ALT U/L  --   --  22   AST U/L  --   --  23   GLUCOSE RANDOM mg/dL 136   < > 178*    < > = values in this interval not displayed.     Results from last 7 days   Lab Units 09/25/24  2226   INR  0.92     Results from last 7 days   Lab Units 09/28/24  0549 09/28/24  0027 09/27/24  1722 09/27/24  1145 09/27/24  0635 09/27/24  0247 09/26/24  2326 09/26/24  2036 09/26/24  1857 09/26/24  1729 09/26/24  1555 09/26/24  1129   POC GLUCOSE mg/dl 158* 161* 135 82 259* 261* 281* 167* 128 59* 77 309*         Results from last 7 days   Lab Units 09/27/24  0533 09/26/24  2139 09/26/24  0451 09/26/24  0201 09/25/24  2226   LACTIC ACID mmol/L 1.4 1.0  --  1.5 2.3*   PROCALCITONIN ng/ml 0.13  --  0.08  --  0.09       Recent Cultures (last 7 days):   Results from last 7 days   Lab Units 09/25/24  2235 09/25/24  2226   BLOOD CULTURE  No Growth at 48 hrs. No Growth at 48 hrs.       Imaging Review: Reviewed radiology reports from this admission including: chest xray and CT head.  Other Studies: EKG was reviewed.     Last 24 Hours Medication List:     Current Facility-Administered Medications:     acetaminophen (TYLENOL) tablet 975 mg, Q6H PRN    ARIPiprazole (ABILIFY) tablet 2 mg, HS    artificial tear ophthalmic ointment, HS    ascorbic acid (VITAMIN  C) tablet 125 mg, Daily    baclofen tablet 10 mg, TID    calcium carbonate (TUMS) chewable tablet 500 mg, BID PRN    carbidopa-levodopa (SINEMET CR)  mg per ER tablet 2 tablet, BID    ceftriaxone (ROCEPHIN) 1 g/50 mL in dextrose IVPB, Q24H, Last Rate: 1,000 mg (09/28/24 0607)    Cholecalciferol (VITAMIN D3) tablet 1,000 Units, Daily    citalopram (CeleXA) tablet 20 mg, Daily    enoxaparin (LOVENOX) subcutaneous injection 40 mg, Daily    Ferrous Sulfate oral syrup 300 mg, Every Other Day    guaiFENesin (ROBITUSSIN) oral liquid 200 mg, 4x Daily PRN    insulin regular (HumuLIN R,NovoLIN R) injection 1-5 Units, Q6H    insulin regular (HumuLIN R,NovoLIN R) injection 4 Units, Q6H    Ketotifen Fumarate (ZADITOR) 0.035 % ophthalmic solution 1 drop, BID    levothyroxine tablet 25 mcg, Early Morning    LORazepam (ATIVAN) tablet 0.5 mg, HS    midodrine (PROAMATINE) tablet 5 mg, TID AC    oxybutynin (DITROPAN) tablet 5 mg, Daily    polyethylene glycol (MIRALAX) packet 17 g, Daily    pravastatin (PRAVACHOL) tablet 40 mg, Daily With Dinner    traZODone (DESYREL) tablet 100 mg, HS    valproic acid (DEPAKENE) oral soln 125 mg, Q12H ROSEMARY    Administrative Statements   Today, Patient Was Seen By: Lisa Carty MD  I have spent a total time of 25 minutes in caring for this patient on the day of the visit/encounter including Reviewing / ordering tests, medicine, procedures  , Obtaining or reviewing history  , and Communicating with other healthcare professionals .    **Please Note: This note may have been constructed using a voice recognition system.**

## 2024-09-28 NOTE — ASSESSMENT & PLAN NOTE
RESOLVED. Patient was brought in by EMS from rehab facility for a fever, her fever was initially 100.9F, was given Tylenol at the facility which had than dropped to 100 F  - Met SIRS on admission with Temp 102 F.    - LA: 2.3, repeat 9/28 1.4  CXR: Pneumonia inferomedial right lung  Due to recent hospitalization with ICU after reported hypotension earlier this month followed by changes to patient's baseline, CT head negative   Was febrile 9/26 to Tmax 102.5 F    Afebrile in past 24 hours. Hypotensive at 88/56 although MAP >65 around 1530 yesterday--repeat manually a minute later was 114/82    Plan:  Continue IV ceftriaxone to complete 5-day course (today is DAY 4 of abx).  Discontinued Flagyl  Patient stable for discharge today, however Lisa from her group home does not have staff this weekend to facilitate that.  Plan for likely discharge on Monday pending she remains clinically stable.  Follow blood cultures (no growth so far)  MRSA culture negative  Trend CBC, and BMP repeat in the AM  Tylenol 975 mg as needed every 6 hours for fevers

## 2024-09-28 NOTE — PLAN OF CARE
Problem: Prexisting or High Potential for Compromised Skin Integrity  Goal: Skin integrity is maintained or improved  Description: INTERVENTIONS:  - Identify patients at risk for skin breakdown  - Assess and monitor skin integrity  - Assess and monitor nutrition and hydration status  - Monitor labs   - Assess for incontinence   - Turn and reposition patient  - Assist with mobility/ambulation  - Relieve pressure over bony prominences  - Avoid friction and shearing  - Provide appropriate hygiene as needed including keeping skin clean and dry  - Evaluate need for skin moisturizer/barrier cream  - Collaborate with interdisciplinary team   - Patient/family teaching  - Consider wound care consult   Outcome: Progressing     Problem: PAIN - ADULT  Goal: Verbalizes/displays adequate comfort level or baseline comfort level  Description: Interventions:  - Encourage patient to monitor pain and request assistance  - Assess pain using appropriate pain scale  - Administer analgesics based on type and severity of pain and evaluate response  - Implement non-pharmacological measures as appropriate and evaluate response  - Consider cultural and social influences on pain and pain management  - Notify physician/advanced practitioner if interventions unsuccessful or patient reports new pain  Outcome: Progressing     Problem: INFECTION - ADULT  Goal: Absence or prevention of progression during hospitalization  Description: INTERVENTIONS:  - Assess and monitor for signs and symptoms of infection  - Monitor lab/diagnostic results  - Monitor all insertion sites, i.e. indwelling lines, tubes, and drains  - Monitor endotracheal if appropriate and nasal secretions for changes in amount and color  - [place] appropriate cooling/warming therapies per order  - Administer medications as ordered  - Instruct and encourage patient and family to use good hand hygiene technique  - Identify and instruct in appropriate isolation precautions for  identified infection/condition  Outcome: Progressing  Goal: Absence of fever/infection during neutropenic period  Description: INTERVENTIONS:  - Monitor WBC    Outcome: Progressing     Problem: SAFETY ADULT  Goal: Patient will remain free of falls  Description: INTERVENTIONS:  - Educate patient/family on patient safety including physical limitations  - Instruct patient to call for assistance with activity   - Consult OT/PT to assist with strengthening/mobility   - Keep Call bell within reach  - Keep bed low and locked with side rails adjusted as appropriate  - Keep care items and personal belongings within reach  - Initiate and maintain comfort rounds  - Make Fall Risk Sign visible to staff  - Offer Toileting every  Hours, in advance of need  - Initiate/Maintain alarm  - Obtain necessary fall risk management equipment  - Apply yellow socks and bracelet for high fall risk patients  - Consider moving patient to room near nurses station  Outcome: Progressing  Goal: Maintain or return to baseline ADL function  Description: INTERVENTIONS:  -  Assess patient's ability to carry out ADLs; assess patient's baseline for ADL function and identify physical deficits which impact ability to perform ADLs (bathing, care of mouth/teeth, toileting, grooming, dressing, etc.)  - Assess/evaluate cause of self-care deficits   - Assess range of motion  - Assess patient's mobility; develop plan if impaired  - Assess patient's need for assistive devices and provide as appropriate  - Encourage maximum independence but intervene and supervise when necessary  - Involve family in performance of ADLs  - Assess for home care needs following discharge   - Consider OT consult to assist with ADL evaluation and planning for discharge  - Provide patient education as appropriate  Outcome: Progressing  Goal: Maintains/Returns to pre admission functional level  Description: INTERVENTIONS:  - Perform AM-PAC 6 Click Basic Mobility/ Daily Activity  assessment daily.  - Set and communicate daily mobility goal to care team and patient/family/caregiver.   - Collaborate with rehabilitation services on mobility goals if consulted  - Perform Range of Motion times a day.  - Reposition patient every  hours.  - Dangle patient  times a day  - Stand patient times a day  - Ambulate patient  times a day  - Out of bed to chair  times a day   - Out of bed for meals times a day  - Out of bed for toileting  - Record patient progress and toleration of activity level   Outcome: Progressing     Problem: DISCHARGE PLANNING  Goal: Discharge to home or other facility with appropriate resources  Description: INTERVENTIONS:  - Identify barriers to discharge w/patient and caregiver  - Arrange for needed discharge resources and transportation as appropriate  - Identify discharge learning needs (meds, wound care, etc.)  - Arrange for interpretive services to assist at discharge as needed  - Refer to Case Management Department for coordinating discharge planning if the patient needs post-hospital services based on physician/advanced practitioner order or complex needs related to functional status, cognitive ability, or social support system  Outcome: Progressing     Problem: Nutrition/Hydration-ADULT  Goal: Nutrient/Hydration intake appropriate for improving, restoring or maintaining nutritional needs  Description: Monitor and assess patient's nutrition/hydration status for malnutrition. Collaborate with interdisciplinary team and initiate plan and interventions as ordered.  Monitor patient's weight and dietary intake as ordered or per policy. Utilize nutrition screening tool and intervene as necessary. Determine patient's food preferences and provide high-protein, high-caloric foods as appropriate.     INTERVENTIONS:  - Monitor oral intake, urinary output, labs, and treatment plans  - Assess nutrition and hydration status and recommend course of action  - Evaluate amount of meals  eaten  - Assist patient with eating if necessary   - Allow adequate time for meals  - Recommend/ encourage appropriate diets, oral nutritional supplements, and vitamin/mineral supplements  - Order, calculate, and assess calorie counts as needed  - Recommend, monitor, and adjust tube feedings and TPN/PPN based on assessed needs  - Assess need for intravenous fluids  - Provide specific nutrition/hydration education as appropriate  - Include patient/family/caregiver in decisions related to nutrition  Outcome: Progressing

## 2024-09-28 NOTE — QUICK NOTE
Spoke to both the patient's POA (Ezra) and her caregiver (Lisa) on the phone to update them on the plan of care.  I discussed the patient's treatment course and her overall improvement with regards to hemodynamics and clinical status.  Per Lisa, she does not have available staff this weekend for the patient to be discharged. She said Monday will work well for the patient to be discharged. All questions and concerns addressed.

## 2024-09-28 NOTE — ASSESSMENT & PLAN NOTE
-She was recently admitted for aspiration pneumonia in August treated with ceftriaxone, and discharged on Vantin  CXR this admission: inferomedial right lung pneumonia. No signs of abscess or putrid smelling breath    Plan:  See plan for sepsis

## 2024-09-29 LAB
ANION GAP SERPL CALCULATED.3IONS-SCNC: 6 MMOL/L (ref 4–13)
BASOPHILS # BLD AUTO: 0.03 THOUSANDS/ÂΜL (ref 0–0.1)
BASOPHILS NFR BLD AUTO: 1 % (ref 0–1)
BUN SERPL-MCNC: 29 MG/DL (ref 5–25)
CALCIUM SERPL-MCNC: 9 MG/DL (ref 8.4–10.2)
CHLORIDE SERPL-SCNC: 101 MMOL/L (ref 96–108)
CO2 SERPL-SCNC: 29 MMOL/L (ref 21–32)
CREAT SERPL-MCNC: 0.84 MG/DL (ref 0.6–1.3)
EOSINOPHIL # BLD AUTO: 0.24 THOUSAND/ÂΜL (ref 0–0.61)
EOSINOPHIL NFR BLD AUTO: 4 % (ref 0–6)
ERYTHROCYTE [DISTWIDTH] IN BLOOD BY AUTOMATED COUNT: 13.6 % (ref 11.6–15.1)
FOLATE SERPL-MCNC: 19.5 NG/ML
GFR SERPL CREATININE-BSD FRML MDRD: 74 ML/MIN/1.73SQ M
GLUCOSE SERPL-MCNC: 141 MG/DL (ref 65–140)
GLUCOSE SERPL-MCNC: 155 MG/DL (ref 65–140)
GLUCOSE SERPL-MCNC: 206 MG/DL (ref 65–140)
GLUCOSE SERPL-MCNC: 217 MG/DL (ref 65–140)
GLUCOSE SERPL-MCNC: 224 MG/DL (ref 65–140)
HCT VFR BLD AUTO: 29.4 % (ref 34.8–46.1)
HGB BLD-MCNC: 9.3 G/DL (ref 11.5–15.4)
IMM GRANULOCYTES # BLD AUTO: 0.02 THOUSAND/UL (ref 0–0.2)
IMM GRANULOCYTES NFR BLD AUTO: 0 % (ref 0–2)
LYMPHOCYTES # BLD AUTO: 1.37 THOUSANDS/ÂΜL (ref 0.6–4.47)
LYMPHOCYTES NFR BLD AUTO: 23 % (ref 14–44)
MCH RBC QN AUTO: 33.1 PG (ref 26.8–34.3)
MCHC RBC AUTO-ENTMCNC: 31.6 G/DL (ref 31.4–37.4)
MCV RBC AUTO: 105 FL (ref 82–98)
MONOCYTES # BLD AUTO: 0.42 THOUSAND/ÂΜL (ref 0.17–1.22)
MONOCYTES NFR BLD AUTO: 7 % (ref 4–12)
NEUTROPHILS # BLD AUTO: 3.8 THOUSANDS/ÂΜL (ref 1.85–7.62)
NEUTS SEG NFR BLD AUTO: 65 % (ref 43–75)
NRBC BLD AUTO-RTO: 0 /100 WBCS
PLATELET # BLD AUTO: 163 THOUSANDS/UL (ref 149–390)
PMV BLD AUTO: 12.5 FL (ref 8.9–12.7)
POTASSIUM SERPL-SCNC: 5.3 MMOL/L (ref 3.5–5.3)
RBC # BLD AUTO: 2.81 MILLION/UL (ref 3.81–5.12)
SODIUM SERPL-SCNC: 136 MMOL/L (ref 135–147)
VIT B12 SERPL-MCNC: 835 PG/ML (ref 180–914)
WBC # BLD AUTO: 5.88 THOUSAND/UL (ref 4.31–10.16)

## 2024-09-29 PROCEDURE — 82746 ASSAY OF FOLIC ACID SERUM: CPT | Performed by: INTERNAL MEDICINE

## 2024-09-29 PROCEDURE — 85025 COMPLETE CBC W/AUTO DIFF WBC: CPT

## 2024-09-29 PROCEDURE — 99232 SBSQ HOSP IP/OBS MODERATE 35: CPT | Performed by: INTERNAL MEDICINE

## 2024-09-29 PROCEDURE — 80048 BASIC METABOLIC PNL TOTAL CA: CPT

## 2024-09-29 PROCEDURE — 82607 VITAMIN B-12: CPT | Performed by: INTERNAL MEDICINE

## 2024-09-29 PROCEDURE — 82948 REAGENT STRIP/BLOOD GLUCOSE: CPT

## 2024-09-29 RX ORDER — POLYETHYLENE GLYCOL 3350 17 G/17G
17 POWDER, FOR SOLUTION ORAL DAILY PRN
Status: DISCONTINUED | OUTPATIENT
Start: 2024-09-29 | End: 2024-10-01 | Stop reason: HOSPADM

## 2024-09-29 RX ADMIN — MIDODRINE HYDROCHLORIDE 5 MG: 5 TABLET ORAL at 12:19

## 2024-09-29 RX ADMIN — KETOTIFEN FUMARATE 1 DROP: 0.25 SOLUTION OPHTHALMIC at 08:23

## 2024-09-29 RX ADMIN — INSULIN HUMAN 4 UNITS: 100 INJECTION, SOLUTION PARENTERAL at 17:26

## 2024-09-29 RX ADMIN — ACETAMINOPHEN 325MG 975 MG: 325 TABLET ORAL at 23:09

## 2024-09-29 RX ADMIN — POLYETHYLENE GLYCOL 3350 17 G: 17 POWDER, FOR SOLUTION ORAL at 08:15

## 2024-09-29 RX ADMIN — BACLOFEN 10 MG: 10 TABLET ORAL at 08:15

## 2024-09-29 RX ADMIN — ARIPIPRAZOLE 2 MG: 2 TABLET ORAL at 22:55

## 2024-09-29 RX ADMIN — KETOTIFEN FUMARATE 1 DROP: 0.25 SOLUTION OPHTHALMIC at 17:00

## 2024-09-29 RX ADMIN — TRAZODONE HYDROCHLORIDE 100 MG: 100 TABLET ORAL at 22:55

## 2024-09-29 RX ADMIN — CEFTRIAXONE SODIUM 1000 MG: 10 INJECTION, POWDER, FOR SOLUTION INTRAVENOUS at 06:00

## 2024-09-29 RX ADMIN — Medication 125 MG: at 08:23

## 2024-09-29 RX ADMIN — CARBIDOPA AND LEVODOPA 2 TABLET: 25; 100 TABLET ORAL at 22:55

## 2024-09-29 RX ADMIN — LORAZEPAM 0.5 MG: 0.5 TABLET ORAL at 22:55

## 2024-09-29 RX ADMIN — PRAVASTATIN SODIUM 40 MG: 40 TABLET ORAL at 17:00

## 2024-09-29 RX ADMIN — CITALOPRAM HYDROBROMIDE 20 MG: 20 TABLET ORAL at 08:15

## 2024-09-29 RX ADMIN — OXYBUTYNIN CHLORIDE 5 MG: 5 TABLET ORAL at 08:16

## 2024-09-29 RX ADMIN — WHITE PETROLATUM 57.7 %-MINERAL OIL 31.9 % EYE OINTMENT: at 22:55

## 2024-09-29 RX ADMIN — VALPROIC ACID 125 MG: 250 SOLUTION ORAL at 08:15

## 2024-09-29 RX ADMIN — MIDODRINE HYDROCHLORIDE 5 MG: 5 TABLET ORAL at 06:00

## 2024-09-29 RX ADMIN — BACLOFEN 10 MG: 10 TABLET ORAL at 17:00

## 2024-09-29 RX ADMIN — INSULIN HUMAN 1 UNITS: 100 INJECTION, SOLUTION PARENTERAL at 23:59

## 2024-09-29 RX ADMIN — INSULIN HUMAN 4 UNITS: 100 INJECTION, SOLUTION PARENTERAL at 00:06

## 2024-09-29 RX ADMIN — INSULIN HUMAN 1 UNITS: 100 INJECTION, SOLUTION PARENTERAL at 05:54

## 2024-09-29 RX ADMIN — BACLOFEN 10 MG: 10 TABLET ORAL at 22:55

## 2024-09-29 RX ADMIN — Medication 1000 UNITS: at 08:15

## 2024-09-29 RX ADMIN — MIDODRINE HYDROCHLORIDE 5 MG: 5 TABLET ORAL at 17:00

## 2024-09-29 RX ADMIN — LEVOTHYROXINE SODIUM 25 MCG: 25 TABLET ORAL at 05:53

## 2024-09-29 RX ADMIN — VALPROIC ACID 125 MG: 250 SOLUTION ORAL at 22:54

## 2024-09-29 RX ADMIN — CARBIDOPA AND LEVODOPA 2 TABLET: 25; 100 TABLET ORAL at 08:15

## 2024-09-29 RX ADMIN — ENOXAPARIN SODIUM 40 MG: 40 INJECTION SUBCUTANEOUS at 08:15

## 2024-09-29 RX ADMIN — INSULIN HUMAN 4 UNITS: 100 INJECTION, SOLUTION PARENTERAL at 05:53

## 2024-09-29 NOTE — PLAN OF CARE
Problem: Prexisting or High Potential for Compromised Skin Integrity  Goal: Skin integrity is maintained or improved  Description: INTERVENTIONS:  - Identify patients at risk for skin breakdown  - Assess and monitor skin integrity  - Assess and monitor nutrition and hydration status  - Monitor labs   - Assess for incontinence   - Turn and reposition patient  - Assist with mobility/ambulation  - Relieve pressure over bony prominences  - Avoid friction and shearing  - Provide appropriate hygiene as needed including keeping skin clean and dry  - Evaluate need for skin moisturizer/barrier cream  - Collaborate with interdisciplinary team   - Patient/family teaching  - Consider wound care consult   Outcome: Progressing     Problem: PAIN - ADULT  Goal: Verbalizes/displays adequate comfort level or baseline comfort level  Description: Interventions:  - Encourage patient to monitor pain and request assistance  - Assess pain using appropriate pain scale  - Administer analgesics based on type and severity of pain and evaluate response  - Implement non-pharmacological measures as appropriate and evaluate response  - Consider cultural and social influences on pain and pain management  - Notify physician/advanced practitioner if interventions unsuccessful or patient reports new pain  Outcome: Progressing     Problem: INFECTION - ADULT  Goal: Absence or prevention of progression during hospitalization  Description: INTERVENTIONS:  - Assess and monitor for signs and symptoms of infection  - Monitor lab/diagnostic results  - Monitor all insertion sites, i.e. indwelling lines, tubes, and drains  - Monitor endotracheal if appropriate and nasal secretions for changes in amount and color  - [place] appropriate cooling/warming therapies per order  - Administer medications as ordered  - Instruct and encourage patient and family to use good hand hygiene technique  - Identify and instruct in appropriate isolation precautions for  identified infection/condition  Outcome: Progressing  Goal: Absence of fever/infection during neutropenic period  Description: INTERVENTIONS:  - Monitor WBC    Outcome: Progressing     Problem: SAFETY ADULT  Goal: Patient will remain free of falls  Description: INTERVENTIONS:  - Educate patient/family on patient safety including physical limitations  - Instruct patient to call for assistance with activity   - Consult OT/PT to assist with strengthening/mobility   - Keep Call bell within reach  - Keep bed low and locked with side rails adjusted as appropriate  - Keep care items and personal belongings within reach  - Initiate and maintain comfort rounds  - Make Fall Risk Sign visible to staff  - Offer Toileting every  Hours, in advance of need  - Initiate/Maintain alarm  - Obtain necessary fall risk management equipment  - Apply yellow socks and bracelet for high fall risk patients  - Consider moving patient to room near nurses station  Outcome: Progressing  Goal: Maintain or return to baseline ADL function  Description: INTERVENTIONS:  -  Assess patient's ability to carry out ADLs; assess patient's baseline for ADL function and identify physical deficits which impact ability to perform ADLs (bathing, care of mouth/teeth, toileting, grooming, dressing, etc.)  - Assess/evaluate cause of self-care deficits   - Assess range of motion  - Assess patient's mobility; develop plan if impaired  - Assess patient's need for assistive devices and provide as appropriate  - Encourage maximum independence but intervene and supervise when necessary  - Involve family in performance of ADLs  - Assess for home care needs following discharge   - Consider OT consult to assist with ADL evaluation and planning for discharge  - Provide patient education as appropriate  Outcome: Progressing  Goal: Maintains/Returns to pre admission functional level  Description: INTERVENTIONS:  - Perform AM-PAC 6 Click Basic Mobility/ Daily Activity  assessment daily.  - Set and communicate daily mobility goal to care team and patient/family/caregiver.   - Collaborate with rehabilitation services on mobility goals if consulted  - Perform Range of Motion times a day.  - Reposition patient every  hours.  - Dangle patient  times a day  - Stand patient times a day  - Ambulate patient  times a day  - Out of bed to chair  times a day   - Out of bed for meals times a day  - Out of bed for toileting  - Record patient progress and toleration of activity level   Outcome: Progressing     Problem: DISCHARGE PLANNING  Goal: Discharge to home or other facility with appropriate resources  Description: INTERVENTIONS:  - Identify barriers to discharge w/patient and caregiver  - Arrange for needed discharge resources and transportation as appropriate  - Identify discharge learning needs (meds, wound care, etc.)  - Arrange for interpretive services to assist at discharge as needed  - Refer to Case Management Department for coordinating discharge planning if the patient needs post-hospital services based on physician/advanced practitioner order or complex needs related to functional status, cognitive ability, or social support system  Outcome: Progressing     Problem: Nutrition/Hydration-ADULT  Goal: Nutrient/Hydration intake appropriate for improving, restoring or maintaining nutritional needs  Description: Monitor and assess patient's nutrition/hydration status for malnutrition. Collaborate with interdisciplinary team and initiate plan and interventions as ordered.  Monitor patient's weight and dietary intake as ordered or per policy. Utilize nutrition screening tool and intervene as necessary. Determine patient's food preferences and provide high-protein, high-caloric foods as appropriate.     INTERVENTIONS:  - Monitor oral intake, urinary output, labs, and treatment plans  - Assess nutrition and hydration status and recommend course of action  - Evaluate amount of meals  eaten  - Assist patient with eating if necessary   - Allow adequate time for meals  - Recommend/ encourage appropriate diets, oral nutritional supplements, and vitamin/mineral supplements  - Order, calculate, and assess calorie counts as needed  - Recommend, monitor, and adjust tube feedings and TPN/PPN based on assessed needs  - Assess need for intravenous fluids  - Provide specific nutrition/hydration education as appropriate  - Include patient/family/caregiver in decisions related to nutrition  Outcome: Progressing

## 2024-09-29 NOTE — ASSESSMENT & PLAN NOTE
RESOLVED. Patient was brought in by EMS from rehab facility for a fever, her fever was initially 100.9F, was given Tylenol at the facility which had than dropped to 100 F  - Met SIRS on admission with Temp 102 F.    - LA: 2.3, repeat 9/28 1.4  CXR: Pneumonia inferomedial right lung  Due to recent hospitalization with ICU after reported hypotension earlier this month followed by changes to patient's baseline, CT head negative   Was febrile 9/26 to Tmax 102.5 F  Afebrile in past 24 hours.    Plan:  Continue IV ceftriaxone to complete 5-day course (today is DAY 5 of abx, will discontinue).  Discontinued Flagyl  Patient stable for discharge over the weekend, however Lisa from her group home does not have staff this weekend to facilitate that. Plan for likely discharge on Monday 9/30 pending she remains clinically stable.  Follow blood cultures (no growth so far)  MRSA culture negative  Trend CBC, and BMP repeat in the AM  Tylenol 975 mg as needed every 6 hours for fevers

## 2024-09-29 NOTE — ASSESSMENT & PLAN NOTE
Lab Results   Component Value Date    HGBA1C 6.2 (H) 08/13/2024     Recent Labs     09/28/24  1200 09/28/24  1805 09/28/24  2358 09/29/24  0537   POCGLU 121 119 120 224*     Blood Sugar Average: Last 72 hrs:  (P) [id number]  Was receiving home regular insulin 4 units every 6 hours. Tube feeds run 12 hours (6pm-6am). Noon doses previously held due to hypoglycemia. Continue 4 units regular insulin at 6 PM, midnight, and 6 AM during tube feeds. Continue q6 SSI. Monitor blood sugars.   Nutrition consulted, appreciate recommendations in regards to tube feeds

## 2024-09-29 NOTE — PLAN OF CARE
Problem: Prexisting or High Potential for Compromised Skin Integrity  Goal: Skin integrity is maintained or improved  Description: INTERVENTIONS:  - Identify patients at risk for skin breakdown  - Assess and monitor skin integrity  - Assess and monitor nutrition and hydration status  - Monitor labs   - Assess for incontinence   - Turn and reposition patient  - Assist with mobility/ambulation  - Relieve pressure over bony prominences  - Avoid friction and shearing  - Provide appropriate hygiene as needed including keeping skin clean and dry  - Evaluate need for skin moisturizer/barrier cream  - Collaborate with interdisciplinary team   - Patient/family teaching  - Consider wound care consult   Outcome: Progressing     Problem: PAIN - ADULT  Goal: Verbalizes/displays adequate comfort level or baseline comfort level  Description: Interventions:  - Encourage patient to monitor pain and request assistance  - Assess pain using appropriate pain scale  - Administer analgesics based on type and severity of pain and evaluate response  - Implement non-pharmacological measures as appropriate and evaluate response  - Consider cultural and social influences on pain and pain management  - Notify physician/advanced practitioner if interventions unsuccessful or patient reports new pain  Outcome: Progressing     Problem: INFECTION - ADULT  Goal: Absence or prevention of progression during hospitalization  Description: INTERVENTIONS:  - Assess and monitor for signs and symptoms of infection  - Monitor lab/diagnostic results  - Monitor all insertion sites, i.e. indwelling lines, tubes, and drains  - Monitor endotracheal if appropriate and nasal secretions for changes in amount and color  - [place] appropriate cooling/warming therapies per order  - Administer medications as ordered  - Instruct and encourage patient and family to use good hand hygiene technique  - Identify and instruct in appropriate isolation precautions for  identified infection/condition  Outcome: Progressing  Goal: Absence of fever/infection during neutropenic period  Description: INTERVENTIONS:  - Monitor WBC    Outcome: Progressing     Problem: SAFETY ADULT  Goal: Patient will remain free of falls  Description: INTERVENTIONS:  - Educate patient/family on patient safety including physical limitations  - Instruct patient to call for assistance with activity   - Consult OT/PT to assist with strengthening/mobility   - Keep Call bell within reach  - Keep bed low and locked with side rails adjusted as appropriate  - Keep care items and personal belongings within reach  - Initiate and maintain comfort rounds  - Make Fall Risk Sign visible to staff  - Offer Toileting every  Hours, in advance of need  - Initiate/Maintain alarm  - Obtain necessary fall risk management equipment  - Apply yellow socks and bracelet for high fall risk patients  - Consider moving patient to room near nurses station  Outcome: Progressing  Goal: Maintain or return to baseline ADL function  Description: INTERVENTIONS:  -  Assess patient's ability to carry out ADLs; assess patient's baseline for ADL function and identify physical deficits which impact ability to perform ADLs (bathing, care of mouth/teeth, toileting, grooming, dressing, etc.)  - Assess/evaluate cause of self-care deficits   - Assess range of motion  - Assess patient's mobility; develop plan if impaired  - Assess patient's need for assistive devices and provide as appropriate  - Encourage maximum independence but intervene and supervise when necessary  - Involve family in performance of ADLs  - Assess for home care needs following discharge   - Consider OT consult to assist with ADL evaluation and planning for discharge  - Provide patient education as appropriate  Outcome: Progressing  Goal: Maintains/Returns to pre admission functional level  Description: INTERVENTIONS:  - Perform AM-PAC 6 Click Basic Mobility/ Daily Activity  assessment daily.  - Set and communicate daily mobility goal to care team and patient/family/caregiver.   - Collaborate with rehabilitation services on mobility goals if consulted  - Perform Range of Motion times a day.  - Reposition patient every  hours.  - Dangle patient  times a day  - Stand patient times a day  - Ambulate patient  times a day  - Out of bed to chair  times a day   - Out of bed for meals times a day  - Out of bed for toileting  - Record patient progress and toleration of activity level   Outcome: Progressing

## 2024-09-29 NOTE — ASSESSMENT & PLAN NOTE
RESOLVED. Patient was brought in by EMS from rehab facility for a fever, her fever was initially 100.9F, was given Tylenol at the facility which had than dropped to 100 F  - Met SIRS on admission with Temp 102 F.    - LA: 2.3, repeat 9/28 1.4  CXR: Pneumonia inferomedial right lung  Due to recent hospitalization with ICU after reported hypotension earlier this month followed by changes to patient's baseline, CT head negative   Was febrile 9/26 to Tmax 102.5 F  Patient was afebrile overnight     Plan:  9/29 last day of 5-day course of IV ceftriaxone to complete.   Discontinued Flagyl  Patient was stable over weekend, afebrile and with no signs or symptoms of infection, plan was to discharge yesterday to group home however due to patient's fever 2 nights ago ordered chest x-ray and procalcitonin to rule out pneumonia, likely aspiration pneumonia               Pro calcitonin 0.08, Chest x-ray unremarkable, WBC within normal limits  Follow blood cultures (no growth so far)  MRSA culture negative  Trend CBC, and BMP repeat in the AM  Tylenol 975 mg as needed every 6 hours for fevers   Plan for discharge today, educated group home about risk factors for reoccurring aspiration precautions

## 2024-09-29 NOTE — ASSESSMENT & PLAN NOTE
Lab Results   Component Value Date    HGB 9.3 (L) 09/29/2024    HGB 9.3 (L) 09/28/2024    HGB 8.9 (L) 09/27/2024     No active bleeding  Continue to monitor  Folate and vitamin B12 within normal limits

## 2024-09-29 NOTE — ASSESSMENT & PLAN NOTE
Lab Results   Component Value Date    HGBA1C 6.2 (H) 08/13/2024     Recent Labs     09/30/24  1718 10/01/24  0027 10/01/24  0632 10/01/24  1115   POCGLU 115 155* 226* 177*     Blood Sugar Average: Last 72 hrs:  (P) [id number]  Was receiving home regular insulin 4 units every 6 hours. Tube feeds run 12 hours (6pm-6am). Noon doses previously held due to hypoglycemia. Continue 4 units regular insulin at 6 PM, midnight, and 6 AM during tube feeds. Continue q6 SSI. Monitor blood sugars.   Nutrition consulted, appreciate recommendations in regards to tube feeds  - Increased free water flushes to 170 mL to increase daily water intake during tube feeds  - Per nutrition recommendation, add 180 mL free water bolus twice daily during day, per patient's caregiver will increase medication bolus to 60 mL before and after medication pass through tube due to patient's timing at day programs  - Recommend adequate oral care that includes brushing mouth 2-3 times daily and moisten mouth throughout day as poor oral care can increase risk for aspiration

## 2024-09-29 NOTE — DISCHARGE INSTR - AVS FIRST PAGE
Dear Terrie Alicea,     It was our pleasure to care for you here at [facility].  It is our hope that we were always able to exceed the expected standards for your care during your stay.  You were hospitalized due to sepsis likely secondary to pneumonia vs pneumonitis.  You were cared for on the west fourth floor by Brooke Mendelson, DO under the service of Irvin Martins* with the [facility] who covers for your primary care physician (PCP), Gunnar Sylvester DO, while you were hospitalized.  If you have any questions or concerns related to this hospitalization, you may contact us at .  For follow up as well as any medication refills, we recommend that you follow up with your primary care physician.  A registered nurse will reach out to you by phone within a few days after your discharge to answer any additional questions that you may have after going home.  However, at this time we provide for you here, the most important instructions / recommendations at discharge:     Notable Medication Adjustments -   Insulin regular 4 units, 5:30 PM, 12 AM, 6 AM during tube feeds   Please give 170 mL free water flushes every 4 hours during tube feeds from 6 pm to 6 am  Testing Required after Discharge -   None   Other Instructions -   Please ensure adequate oral hygiene and care by brushing mouth 2-3 times daily and frequent moistening mouth to decrease risk of aspiration   Due to increased risk of aspiration pneumonia vs pneumonitis, please ensure head of bed is elevated to at least 30 degrees at all times and positioning of patient remains in stable during feeds  Please review this entire after visit summary as additional general instructions including medication list, appointments, activity, diet, any pertinent wound care, and other additional recommendations from your care team that may be provided for you.      Sincerely,     Brooke Mendelson,  DO

## 2024-09-29 NOTE — ASSESSMENT & PLAN NOTE
Lab Results   Component Value Date     10/01/2024     09/30/2024     09/29/2024     Resolved. No active bleeding, 9/29 platelets within normal limits at 163  Continue to monitor

## 2024-09-29 NOTE — DISCHARGE SUMMARY
Discharge Summary - Hospitalist   Name: Terrie Alicea 64 y.o. female I MRN: [id number]  Unit/Bed#: W -01 I Date of Admission: 9/25/2024   Date of Service: 10/1/2024 I Hospital Day: 6     Assessment & Plan  Severe sepsis (HCC) (Resolved: 10/1/2024)  RESOLVED. Patient was brought in by EMS from rehab facility for a fever, her fever was initially 100.9F, was given Tylenol at the facility which had than dropped to 100 F  - Met SIRS on admission with Temp 102 F.    - LA: 2.3, repeat 9/28 1.4  CXR: Pneumonia inferomedial right lung  Due to recent hospitalization with ICU after reported hypotension earlier this month followed by changes to patient's baseline, CT head negative   Was febrile 9/26 to Tmax 102.5 F  Patient was afebrile overnight     Plan:  9/29 last day of 5-day course of IV ceftriaxone to complete.   Discontinued Flagyl  Patient was stable over weekend, afebrile and with no signs or symptoms of infection, plan was to discharge yesterday to group home however due to patient's fever 2 nights ago ordered chest x-ray and procalcitonin to rule out pneumonia, likely aspiration pneumonia               Pro calcitonin 0.08, Chest x-ray unremarkable, WBC within normal limits  Follow blood cultures (no growth so far)  MRSA culture negative  Trend CBC, and BMP repeat in the AM  Tylenol 975 mg as needed every 6 hours for fevers   Plan for discharge today, educated group home about risk factors for reoccurring aspiration precautions   Pneumonia  -She was recently admitted for aspiration pneumonia in August treated with ceftriaxone, and discharged on Vantin  CXR this admission: inferomedial right lung pneumonia. No signs of abscess or putrid smelling breath  Repeat chest x ray unremarkable 9/30     Plan:  See plan for sepsis   Diabetes mellitus type 2, insulin dependent (HCC)  Lab Results   Component Value Date    HGBA1C 6.2 (H) 08/13/2024     Recent Labs     09/30/24  1718 10/01/24  0027 10/01/24  0632  10/01/24  1115   POCGLU 115 155* 226* 177*     Blood Sugar Average: Last 72 hrs:  (P) 159.5163781078245044  Was receiving home regular insulin 4 units every 6 hours. Tube feeds run 12 hours (6pm-6am). Noon doses previously held due to hypoglycemia. Continue 4 units regular insulin at 6 PM, midnight, and 6 AM during tube feeds. Continue q6 SSI. Monitor blood sugars.   Nutrition consulted, appreciate recommendations in regards to tube feeds  - Increased free water flushes to 170 mL to increase daily water intake during tube feeds  - Per nutrition recommendation, add 180 mL free water bolus twice daily during day, per patient's caregiver will increase medication bolus to 60 mL before and after medication pass through tube due to patient's timing at day programs  - Recommend adequate oral care that includes brushing mouth 2-3 times daily and moisten mouth throughout day as poor oral care can increase risk for aspiration   Parkinson's disease (HCC)  Continue home Sinemet  Urinary incontinence  Continue home oxybutynin  Hypothyroidism  Continue home levothyroxine   Mood disturbance  Continue home abilify, citalopram, and trazodone   Platelets decreased (HCC) (Resolved: 9/29/2024)  Lab Results   Component Value Date     10/01/2024     09/30/2024     09/29/2024     Resolved. No active bleeding, 9/29 platelets within normal limits at 163  Continue to monitor  Anemia  Lab Results   Component Value Date    HGB 9.3 (L) 10/01/2024    HGB 9.3 (L) 09/30/2024    HGB 9.3 (L) 09/29/2024     No active bleeding  Continue to monitor  Folate and vitamin B12 within normal limits       Medical Problems       Resolved Problems  Date Reviewed: 10/1/2024            Resolved    Hypotension 9/29/2024     Resolved by  Irvin Terrell MD    Platelets decreased (HCC) 9/29/2024     Resolved by  Irvin Terrell MD    * (Principal) Severe sepsis (HCC) 10/1/2024     Resolved by  Brooke Mendelson, DO    Fever  "10/1/2024     Resolved by  Brooke Mendelson, DO        Discharging Physician / Practitioner: Brooke Mendelson, DO  PCP: Gunnar Sylvester DO  Admission Date:   Admission Orders (From admission, onward)       Ordered        09/25/24 2354  INPATIENT ADMISSION  Once                          Discharge Date: 10/01/24    Consultations During Hospital Stay:  Nutrition     Procedures Performed:   None     Significant Findings / Test Results:   Xray chest 9/25/24: \"Pneumonia inferomedial right lung. Follow-up after treatment to ensure resolution is recommended.\"  Macrocytic anemia, hemoglobin 9.3, hematocrit 29.4, , folate and vitamin b12 within normal limits.   Xray chest 9/30/24: unremarkable compared to previous with \"Small right lung opacity may be secondary to rotated positioning. Recommend follow-up PA and lateral chest radiograph when the patient is able.\"    Incidental Findings:   None     Test Results Pending at Discharge (will require follow up):   None     Outpatient Tests Requested:  None     Complications:  None     Reason for Admission: Sepsis     Hospital Course:   Terrie Alicea is a 64 y.o. female patient who PMH of cerebral palsy, T2DM, parkinson's disease, and mood disorders who originally presented to the hospital on 9/25/2024 due to fever. She presented from rehab facility where caregiver had noticed she had a fever around 100.8 F, she was given Tylenol which reduced her fever to 100 F.  The wound care physician at the facility recommended she come to the hospital for further management. According to facility staff she was not at her baseline. She was previously admitted for severe sepsis this past August and discharged on 9/19 , and was treated for aspiration pneumonia.  Per chart review she has had multiple infections in the past.  At the beginning of patient's hospitalization her caregiver was concerned that patient has not been back to baseline since ICU admission earlier this month.  It was noted at " that time that the patient's pupils are dilated more than usual and the patient was not answering yes or no questions like she used to prior to this.  Throughout her hospitalization her pupils have been equal and reactive to light.  CT head was obtained at this time to rule out any organic cause for changes to baseline, CT was negative at this time.  Patient was septic on presentation, this was likely due to pneumonia possibly of aspiration pneumonia as patient has been on tube feeding.  Consideration for pneumonitis with recurrent aspiration was also considered during hospitalization.  Patient was on a 5-day course of antibiotics which included 1 day of cefepime and 4 days of ceftriaxone.  Patient also had course of Flagyl.  Results of cultures showed no growth.  It was also noted on patient's blood work that she had macrocytic anemia that is compared to her baseline shows chronic anemia.  Folate and vitamin B12 were within normal limits.  At time of discharge patient has been afebrile for past 48 hours and not showing any signs or symptoms of sepsis or infection.  During hospitalization patient's tube feeding occurred from 6 PM to 6 AM the following day, patient's at home insulin of 4 units subcutaneous has been changed to 6 PM, midnight, and 6 AM and insulin sliding scale was continued for every 6 hours.  Educated patient's caregiver of the importance of elevated head of bed and aspiration precautions. Recommend increased oral hygiene to limit risk of aspiration and discussed increasing free water bolus with medications to increase total daily water intake. At time of discharge patient was stable and not showing any signs or symptoms of infection, patient to be discharged to group home.    Please see above list of diagnoses and related plan for additional information.     Condition at Discharge: stable    Discussion with Family: Updated  (patient caregiver Lisa) at bedside. Called patient's POA  to update this afternoon.    Discharge instructions/Information to patient and family:   See after visit summary for information provided to patient and family.      Provisions for Follow-Up Care:  See after visit summary for information related to follow-up care and any pertinent home health orders.      Mobility at time of Discharge:   Basic Mobility Inpatient Raw Score: 6  JH-HLM Goal: 2: Bed activities/Dependent transfer  JH-HLM Achieved: 1: Laying in bed  HLM Goal NOT achieved. Continue to encourage mobility in post discharge setting.     Disposition:   Group Home / Personal Care [facility] at [facility]    Planned Readmission: No    Discharge Medications:  See after visit summary for reconciled discharge medications provided to patient and/or family.      Administrative Statements   Discharge Statement:  I have spent a total time of 60 minutes in caring for this patient on the day of the visit/encounter. >30 minutes of time was spent on: Diagnostic results, Prognosis, Risks and benefits of tx options, Instructions for management, Patient and family education, Importance of tx compliance, Risk factor reductions, Counseling / Coordination of care, Documenting in the medical record, Reviewing / ordering tests, medicine, procedures  , and Communicating with other healthcare professionals .    **Please Note: This note may have been constructed using a voice recognition system**

## 2024-09-29 NOTE — PROGRESS NOTES
Progress Note - Hospitalist   Name: Terrie Alicea 64 y.o. female I MRN: [id number]  Unit/Bed#: W -01 I Date of Admission: 9/25/2024   Date of Service: 9/29/2024 I Hospital Day: 4    Assessment & Plan  Severe sepsis (HCC)  RESOLVED. Patient was brought in by EMS from rehab facility for a fever, her fever was initially 100.9F, was given Tylenol at the facility which had than dropped to 100 F  - Met SIRS on admission with Temp 102 F.    - LA: 2.3, repeat 9/28 1.4  CXR: Pneumonia inferomedial right lung  Due to recent hospitalization with ICU after reported hypotension earlier this month followed by changes to patient's baseline, CT head negative   Was febrile 9/26 to Tmax 102.5 F  Afebrile in past 24 hours.    Plan:  Continue IV ceftriaxone to complete 5-day course (today is DAY 5 of abx, will discontinue).  Discontinued Flagyl  Patient stable for discharge over the weekend, however Lisa from her group home does not have staff this weekend to facilitate that. Plan for likely discharge on Monday 9/30 pending she remains clinically stable.  Follow blood cultures (no growth so far)  MRSA culture negative  Trend CBC, and BMP repeat in the AM  Tylenol 975 mg as needed every 6 hours for fevers   Pneumonia  -She was recently admitted for aspiration pneumonia in August treated with ceftriaxone, and discharged on Vantin  CXR this admission: inferomedial right lung pneumonia. No signs of abscess or putrid smelling breath    Plan:  See plan for sepsis   Diabetes mellitus type 2, insulin dependent (HCC)  Lab Results   Component Value Date    HGBA1C 6.2 (H) 08/13/2024     Recent Labs     09/28/24  1200 09/28/24  1805 09/28/24  2358 09/29/24  0537   POCGLU 121 119 120 224*     Blood Sugar Average: Last 72 hrs:  (P) 177.0883552704746624  Was receiving home regular insulin 4 units every 6 hours. Tube feeds run 12 hours (6pm-6am). Noon doses previously held due to hypoglycemia. Continue 4 units regular insulin at 6 PM,  midnight, and 6 AM during tube feeds. Continue q6 SSI. Monitor blood sugars.   Nutrition consulted, appreciate recommendations in regards to tube feeds  Parkinson's disease (HCC)  Continue home Sinemet  Urinary incontinence  Continue home oxybutynin  Hypotension (Resolved: 9/29/2024)  Continue home midodrine   Hypothyroidism  Continue home levothyroxine   Mood disturbance  Continue home abilify, citalopram, and trazodone   Platelets decreased (HCC)  Lab Results   Component Value Date     09/29/2024     09/28/2024     (L) 09/27/2024     Resolved. No active bleeding, 9/29 platelets within normal limits at 163  Continue to monitor  Anemia  Lab Results   Component Value Date    HGB 9.3 (L) 09/29/2024    HGB 9.3 (L) 09/28/2024    HGB 8.9 (L) 09/27/2024     No active bleeding  Continue to monitor  Folate and vitamin B12 within normal limits      VTE Pharmacologic Prophylaxis: VTE Score: 6 High Risk (Score >/= 5) - Pharmacological DVT Prophylaxis Ordered: enoxaparin (Lovenox). Sequential Compression Devices Ordered.    Mobility:   Basic Mobility Inpatient Raw Score: 6  JH-HLM Goal: 2: Bed activities/Dependent transfer  JH-HLM Achieved: 2: Bed activities/Dependent transfer  JH-HLM Goal NOT achieved. Continue with multidisciplinary rounding and encourage appropriate mobility to improve upon JH-HLM goals.    Patient Centered Rounds: I performed bedside rounds with nursing staff today.   Discussions with Specialists or Other Care Team Provider: None    Education and Discussions with Family / Patient: Updated  (caregiver, Lisa and HANSEL Morgan) via phone.    Current Length of Stay: 4 day(s)  Current Patient Status: Inpatient   Certification Statement: The patient will continue to require additional inpatient hospital stay due to pneumonia.  Discharge Plan: Anticipate discharge in 24-48 hrs to group home.    Code Status: Level 1 - Full Code    Subjective   Patient was sleeping  this morning in no acute distress with cartoons on in the background.  Patient does not seem to be in any pain during today's evaluation, she continues to be nonverbal however opens her eyes looks around the room during physical exam.  Per conversation with her nurse this morning he has no concerns at this time patient has been afebrile overnight and has been tolerating her tube feed overnight which she is currently getting this morning.  Patient received last dose of IV ceftriaxone this morning.  Likely discharge tomorrow to group home as patient is clinically stable and not showing any signs or symptoms of infection at this time.    Per nursing staff, patient had episode of diarrhea, Miralax changed to as needed.     Per patient's caregiver, Lisa, patient was supposed to have sutures removed from peg tube on  and would like to know if she could have these removed prior to discharge, will follow up with Lisa tomorrow.     Objective     Vitals:   Temp (24hrs), Av.3 °F (36.8 °C), Min:98 °F (36.7 °C), Max:98.5 °F (36.9 °C)    Temp:  [98 °F (36.7 °C)-98.5 °F (36.9 °C)] 98.5 °F (36.9 °C)  HR:  [62-80] 62  Resp:  [16-20] 20  BP: (102-125)/(59-68) 104/59  SpO2:  [95 %-98 %] 98 %  Body mass index is 20.55 kg/m².     Input and Output Summary (last 24 hours):     Intake/Output Summary (Last 24 hours) at 2024 0835  Last data filed at 2024 1745  Gross per 24 hour   Intake 0 ml   Output --   Net 0 ml       Physical Exam  HENT:      Head: Normocephalic.      Mouth/Throat:      Mouth: Mucous membranes are moist.   Eyes:      Pupils: Pupils are equal, round, and reactive to light.   Cardiovascular:      Rate and Rhythm: Normal rate and regular rhythm.   Pulmonary:      Effort: Pulmonary effort is normal. No respiratory distress.      Breath sounds: Normal breath sounds.      Comments: Saturating well on room air  Abdominal:      Palpations: Abdomen is soft.      Tenderness: There is no abdominal tenderness.  There is no guarding.      Comments: PEG tube in place, no erythema or drainage, currently getting tube feed   Musculoskeletal:      Right lower leg: No edema.      Left lower leg: No edema.   Skin:     General: Skin is warm and dry.   Neurological:      Mental Status: Mental status is at baseline.      Comments: Upper and lower extremity contractures.          Lines/Drains:  Lines/Drains/Airways       Active Status       Name Placement date Placement time Site Days    Gastrostomy/Enterostomy  16 Fr. LUQ 08/26/24  1643  LUQ  33                      Lab Results: I have reviewed the following results:    Results from last 7 days   Lab Units 09/29/24  0544   WBC Thousand/uL 5.88   HEMOGLOBIN g/dL 9.3*   HEMATOCRIT % 29.4*   PLATELETS Thousands/uL 163   SEGS PCT % 65   LYMPHO PCT % 23   MONO PCT % 7   EOS PCT % 4     Results from last 7 days   Lab Units 09/29/24  0544 09/26/24  0451 09/25/24  2226   SODIUM mmol/L 136   < > 139   POTASSIUM mmol/L 5.3   < > 4.3   CHLORIDE mmol/L 101   < > 97   CO2 mmol/L 29   < > 35*   BUN mg/dL 29*   < > 34*   CREATININE mg/dL 0.84   < > 1.14   ANION GAP mmol/L 6   < > 7   CALCIUM mg/dL 9.0   < > 10.5*   ALBUMIN g/dL  --   --  3.9   TOTAL BILIRUBIN mg/dL  --   --  0.43   ALK PHOS U/L  --   --  45   ALT U/L  --   --  22   AST U/L  --   --  23   GLUCOSE RANDOM mg/dL 206*   < > 178*    < > = values in this interval not displayed.     Results from last 7 days   Lab Units 09/25/24  2226   INR  0.92     Results from last 7 days   Lab Units 09/29/24  0537 09/28/24  2358 09/28/24  1805 09/28/24  1200 09/28/24  0549 09/28/24  0027 09/27/24  1722 09/27/24  1145 09/27/24  0635 09/27/24  0247 09/26/24  2326 09/26/24  2036   POC GLUCOSE mg/dl 224* 120 119 121 158* 161* 135 82 259* 261* 281* 167*         Results from last 7 days   Lab Units 09/27/24  0533 09/26/24  2139 09/26/24  0451 09/26/24  0201 09/25/24  2226   LACTIC ACID mmol/L 1.4 1.0  --  1.5 2.3*   PROCALCITONIN ng/ml 0.13  --  0.08  --   0.09       Recent Cultures (last 7 days):   Results from last 7 days   Lab Units 09/25/24  2235 09/25/24  2226   BLOOD CULTURE  No Growth at 72 hrs. No Growth at 72 hrs.       Imaging Review: Reviewed radiology reports from this admission including: chest xray and CT head.  Other Studies: EKG was reviewed.     Last 24 Hours Medication List:     Current Facility-Administered Medications:     acetaminophen (TYLENOL) tablet 975 mg, Q6H PRN    ARIPiprazole (ABILIFY) tablet 2 mg, HS    artificial tear ophthalmic ointment, HS    ascorbic acid (VITAMIN C) tablet 125 mg, Daily    baclofen tablet 10 mg, TID    calcium carbonate (TUMS) chewable tablet 500 mg, BID PRN    carbidopa-levodopa (SINEMET)  mg per tablet 2 tablet, BID    ceftriaxone (ROCEPHIN) 1 g/50 mL in dextrose IVPB, Q24H, Last Rate: 1,000 mg (09/29/24 0600)    Cholecalciferol (VITAMIN D3) tablet 1,000 Units, Daily    citalopram (CeleXA) tablet 20 mg, Daily    enoxaparin (LOVENOX) subcutaneous injection 40 mg, Daily    Ferrous Sulfate oral syrup 300 mg, Every Other Day    guaiFENesin (ROBITUSSIN) oral liquid 200 mg, 4x Daily PRN    insulin regular (HumuLIN R,NovoLIN R) injection 1-5 Units, Q6H    insulin regular (HumuLIN R,NovoLIN R) injection 4 Units, TID    Ketotifen Fumarate (ZADITOR) 0.035 % ophthalmic solution 1 drop, BID    levothyroxine tablet 25 mcg, Early Morning    LORazepam (ATIVAN) tablet 0.5 mg, HS    midodrine (PROAMATINE) tablet 5 mg, TID AC    oxybutynin (DITROPAN) tablet 5 mg, Daily    polyethylene glycol (MIRALAX) packet 17 g, Daily    pravastatin (PRAVACHOL) tablet 40 mg, Daily With Dinner    traZODone (DESYREL) tablet 100 mg, HS    valproic acid (DEPAKENE) oral soln 125 mg, Q12H ROSEMARY    Administrative Statements   Today, Patient Was Seen By: Brooke Mendelson, DO  I have spent a total time of 30 minutes in caring for this patient on the day of the visit/encounter including Diagnostic results, Prognosis, Risks and benefits of tx options,  Instructions for management, Patient and family education, Counseling / Coordination of care, Documenting in the medical record, Reviewing / ordering tests, medicine, procedures  , Obtaining or reviewing history  , and Communicating with other healthcare professionals . Topics discussed with the patient / family include symptom assessment and management, medication review, and medication adjustment.    **Please Note: This note may have been constructed using a voice recognition system.**

## 2024-09-29 NOTE — ASSESSMENT & PLAN NOTE
Lab Results   Component Value Date     09/29/2024     09/28/2024     (L) 09/27/2024     Resolved. No active bleeding, 9/29 platelets within normal limits at 163  Continue to monitor

## 2024-09-29 NOTE — ASSESSMENT & PLAN NOTE
-She was recently admitted for aspiration pneumonia in August treated with ceftriaxone, and discharged on Vantin  CXR this admission: inferomedial right lung pneumonia. No signs of abscess or putrid smelling breath  Repeat chest x ray unremarkable 9/30     Plan:  See plan for sepsis

## 2024-09-29 NOTE — ASSESSMENT & PLAN NOTE
Continue home oxybutynin   4-calculated by average/Not able to assess (calculate score using [facility] averaging method) 4-calculated by average/Not able to assess (calculate score using [facility] averaging method)

## 2024-09-29 NOTE — ASSESSMENT & PLAN NOTE
Lab Results   Component Value Date    HGB 9.3 (L) 10/01/2024    HGB 9.3 (L) 09/30/2024    HGB 9.3 (L) 09/29/2024     No active bleeding  Continue to monitor  Folate and vitamin B12 within normal limits

## 2024-09-30 ENCOUNTER — APPOINTMENT (INPATIENT)
Dept: RADIOLOGY | Facility: HOSPITAL | Age: 64
DRG: 871 | End: 2024-09-30
Payer: MEDICARE

## 2024-09-30 LAB
BASOPHILS # BLD AUTO: 0.04 THOUSANDS/ÂΜL (ref 0–0.1)
BASOPHILS NFR BLD AUTO: 1 % (ref 0–1)
EOSINOPHIL # BLD AUTO: 0.16 THOUSAND/ÂΜL (ref 0–0.61)
EOSINOPHIL NFR BLD AUTO: 3 % (ref 0–6)
ERYTHROCYTE [DISTWIDTH] IN BLOOD BY AUTOMATED COUNT: 13.2 % (ref 11.6–15.1)
GLUCOSE SERPL-MCNC: 115 MG/DL (ref 65–140)
GLUCOSE SERPL-MCNC: 212 MG/DL (ref 65–140)
GLUCOSE SERPL-MCNC: 92 MG/DL (ref 65–140)
HCT VFR BLD AUTO: 28.9 % (ref 34.8–46.1)
HGB BLD-MCNC: 9.3 G/DL (ref 11.5–15.4)
IMM GRANULOCYTES # BLD AUTO: 0.02 THOUSAND/UL (ref 0–0.2)
IMM GRANULOCYTES NFR BLD AUTO: 0 % (ref 0–2)
LYMPHOCYTES # BLD AUTO: 1.95 THOUSANDS/ÂΜL (ref 0.6–4.47)
LYMPHOCYTES NFR BLD AUTO: 37 % (ref 14–44)
MCH RBC QN AUTO: 33.2 PG (ref 26.8–34.3)
MCHC RBC AUTO-ENTMCNC: 32.2 G/DL (ref 31.4–37.4)
MCV RBC AUTO: 103 FL (ref 82–98)
MONOCYTES # BLD AUTO: 0.37 THOUSAND/ÂΜL (ref 0.17–1.22)
MONOCYTES NFR BLD AUTO: 7 % (ref 4–12)
NEUTROPHILS # BLD AUTO: 2.71 THOUSANDS/ÂΜL (ref 1.85–7.62)
NEUTS SEG NFR BLD AUTO: 52 % (ref 43–75)
NRBC BLD AUTO-RTO: 0 /100 WBCS
PLATELET # BLD AUTO: 178 THOUSANDS/UL (ref 149–390)
PMV BLD AUTO: 12.3 FL (ref 8.9–12.7)
PROCALCITONIN SERPL-MCNC: 0.08 NG/ML
RBC # BLD AUTO: 2.8 MILLION/UL (ref 3.81–5.12)
WBC # BLD AUTO: 5.25 THOUSAND/UL (ref 4.31–10.16)

## 2024-09-30 PROCEDURE — 85025 COMPLETE CBC W/AUTO DIFF WBC: CPT

## 2024-09-30 PROCEDURE — 99232 SBSQ HOSP IP/OBS MODERATE 35: CPT | Performed by: INTERNAL MEDICINE

## 2024-09-30 PROCEDURE — 71045 X-RAY EXAM CHEST 1 VIEW: CPT

## 2024-09-30 PROCEDURE — 82948 REAGENT STRIP/BLOOD GLUCOSE: CPT

## 2024-09-30 PROCEDURE — 84145 PROCALCITONIN (PCT): CPT

## 2024-09-30 RX ADMIN — OXYBUTYNIN CHLORIDE 5 MG: 5 TABLET ORAL at 09:02

## 2024-09-30 RX ADMIN — FERROUS SULFATE 300 MG: 300 SOLUTION ORAL at 09:02

## 2024-09-30 RX ADMIN — KETOTIFEN FUMARATE 1 DROP: 0.25 SOLUTION OPHTHALMIC at 17:03

## 2024-09-30 RX ADMIN — CARBIDOPA AND LEVODOPA 2 TABLET: 25; 100 TABLET ORAL at 22:52

## 2024-09-30 RX ADMIN — CARBIDOPA AND LEVODOPA 2 TABLET: 25; 100 TABLET ORAL at 09:02

## 2024-09-30 RX ADMIN — VALPROIC ACID 125 MG: 250 SOLUTION ORAL at 09:02

## 2024-09-30 RX ADMIN — BACLOFEN 10 MG: 10 TABLET ORAL at 22:48

## 2024-09-30 RX ADMIN — PRAVASTATIN SODIUM 40 MG: 40 TABLET ORAL at 17:03

## 2024-09-30 RX ADMIN — INSULIN HUMAN 1 UNITS: 100 INJECTION, SOLUTION PARENTERAL at 06:23

## 2024-09-30 RX ADMIN — BACLOFEN 10 MG: 10 TABLET ORAL at 09:02

## 2024-09-30 RX ADMIN — ENOXAPARIN SODIUM 40 MG: 40 INJECTION SUBCUTANEOUS at 09:02

## 2024-09-30 RX ADMIN — WHITE PETROLATUM 57.7 %-MINERAL OIL 31.9 % EYE OINTMENT: at 22:56

## 2024-09-30 RX ADMIN — ARIPIPRAZOLE 2 MG: 2 TABLET ORAL at 22:56

## 2024-09-30 RX ADMIN — INSULIN HUMAN 4 UNITS: 100 INJECTION, SOLUTION PARENTERAL at 18:39

## 2024-09-30 RX ADMIN — VALPROIC ACID 125 MG: 250 SOLUTION ORAL at 22:48

## 2024-09-30 RX ADMIN — Medication 1000 UNITS: at 09:02

## 2024-09-30 RX ADMIN — INSULIN HUMAN 4 UNITS: 100 INJECTION, SOLUTION PARENTERAL at 00:00

## 2024-09-30 RX ADMIN — MIDODRINE HYDROCHLORIDE 5 MG: 5 TABLET ORAL at 17:03

## 2024-09-30 RX ADMIN — LORAZEPAM 0.5 MG: 0.5 TABLET ORAL at 22:47

## 2024-09-30 RX ADMIN — MIDODRINE HYDROCHLORIDE 5 MG: 5 TABLET ORAL at 12:19

## 2024-09-30 RX ADMIN — TRAZODONE HYDROCHLORIDE 100 MG: 100 TABLET ORAL at 22:47

## 2024-09-30 RX ADMIN — KETOTIFEN FUMARATE 1 DROP: 0.25 SOLUTION OPHTHALMIC at 09:02

## 2024-09-30 RX ADMIN — INSULIN HUMAN 4 UNITS: 100 INJECTION, SOLUTION PARENTERAL at 06:29

## 2024-09-30 RX ADMIN — Medication 125 MG: at 09:41

## 2024-09-30 RX ADMIN — CITALOPRAM HYDROBROMIDE 20 MG: 20 TABLET ORAL at 09:02

## 2024-09-30 RX ADMIN — BACLOFEN 10 MG: 10 TABLET ORAL at 17:03

## 2024-09-30 RX ADMIN — MIDODRINE HYDROCHLORIDE 5 MG: 5 TABLET ORAL at 06:23

## 2024-09-30 RX ADMIN — LEVOTHYROXINE SODIUM 25 MCG: 25 TABLET ORAL at 06:23

## 2024-09-30 NOTE — ASSESSMENT & PLAN NOTE
Lab Results   Component Value Date    HGBA1C 6.2 (H) 08/13/2024     Recent Labs     09/29/24  1726 09/29/24  2350 09/30/24  0612 09/30/24  1115   POCGLU 141* 217* 212* 92     Blood Sugar Average: Last 72 hrs:  (P) 163.8  Was receiving home regular insulin 4 units every 6 hours. Tube feeds run 12 hours (6pm-6am). Noon doses previously held due to hypoglycemia. Continue 4 units regular insulin at 6 PM, midnight, and 6 AM during tube feeds. Continue q6 SSI. Monitor blood sugars.   Nutrition consulted, appreciate recommendations in regards to tube feeds  - Increase free water flushes to 170 mL to increase daily water intake   Patient receives 1008 mL free water from tube feed with 6 flushes within a day, this equals about 2 L daily

## 2024-09-30 NOTE — QUICK NOTE
Writer spoke to patient's POA,  Emmtet Morgan, to discuss goals of care for patient.  Due to patient having a guardianship, legally patient will remain a full code for the remainder of her life.  An update was provided to POA, no further questions at this time.

## 2024-09-30 NOTE — PROGRESS NOTES
Progress Note - Hospitalist   Name: Terrie Alicea 64 y.o. female I MRN: [id number]  Unit/Bed#: W -01 I Date of Admission: 9/25/2024   Date of Service: 9/30/2024 I Hospital Day: 5    Assessment & Plan  Severe sepsis (HCC)  RESOLVED. Patient was brought in by EMS from rehab facility for a fever, her fever was initially 100.9F, was given Tylenol at the facility which had than dropped to 100 F  - Met SIRS on admission with Temp 102 F.    - LA: 2.3, repeat 9/28 1.4  CXR: Pneumonia inferomedial right lung  Due to recent hospitalization with ICU after reported hypotension earlier this month followed by changes to patient's baseline, CT head negative   Was febrile 9/26 to Tmax 102.5 F  Patient had fever overnight of 101.2 F, tylenol brought fever down to 99.9    Plan:  9/29 last day of 5-day course of IV ceftriaxone to complete.   Discontinued Flagyl  Patient was stable over weekend, afebrile and with no signs or symptoms of infection, plan was to discharge today to group home however due to patient's fever overnight will order chest x-ray and procalcitonin to rule out pneumonia, likely aspiration pneumonia   Pro calcitonin 0.08, Chest x-ray unremarkable, WBC within normal limits  Follow blood cultures (no growth so far)  MRSA culture negative  Trend CBC, and BMP repeat in the AM  Tylenol 975 mg as needed every 6 hours for fevers   Continue monitoring patient overnight  Pneumonia  -She was recently admitted for aspiration pneumonia in August treated with ceftriaxone, and discharged on Vantin  CXR this admission: inferomedial right lung pneumonia. No signs of abscess or putrid smelling breath  Repeat chest x ray unremarkable 9/30    Plan:  See plan for sepsis   Diabetes mellitus type 2, insulin dependent (HCC)  Lab Results   Component Value Date    HGBA1C 6.2 (H) 08/13/2024     Recent Labs     09/29/24  1726 09/29/24  2350 09/30/24  0612 09/30/24  1115   POCGLU 141* 217* 212* 92     Blood Sugar Average: Last 72  hrs:  (P) 163.8  Was receiving home regular insulin 4 units every 6 hours. Tube feeds run 12 hours (6pm-6am). Noon doses previously held due to hypoglycemia. Continue 4 units regular insulin at 6 PM, midnight, and 6 AM during tube feeds. Continue q6 SSI. Monitor blood sugars.   Nutrition consulted, appreciate recommendations in regards to tube feeds  - Increase free water flushes to 170 mL to increase daily water intake   Patient receives 1008 mL free water from tube feed with 6 flushes within a day, this equals about 2 L daily   Parkinson's disease (HCC)  Continue home Sinemet  Urinary incontinence  Continue home oxybutynin  Hypothyroidism  Continue home levothyroxine   Mood disturbance  Continue home abilify, citalopram, and trazodone   Anemia  Lab Results   Component Value Date    HGB 9.3 (L) 09/30/2024    HGB 9.3 (L) 09/29/2024    HGB 9.3 (L) 09/28/2024     No active bleeding  Continue to monitor  Folate and vitamin B12 within normal limits    Fever  Please see plan above    VTE Pharmacologic Prophylaxis: VTE Score: 6 High Risk (Score >/= 5) - Pharmacological DVT Prophylaxis Ordered: enoxaparin (Lovenox). Sequential Compression Devices Ordered.    Mobility:   Basic Mobility Inpatient Raw Score: 6  JH-HLM Goal: 2: Bed activities/Dependent transfer  JH-HLM Achieved: 2: Bed activities/Dependent transfer  JH-HLM Goal NOT achieved. Continue with multidisciplinary rounding and encourage appropriate mobility to improve upon JH-HLM goals.    Patient Centered Rounds: I performed bedside rounds with nursing staff today.   Discussions with Specialists or Other Care Team Provider: None    Education and Discussions with Family / Patient: Updated  (caregiver, rex and HANSEL Munguia via phone) at bedside.    Current Length of Stay: 5 day(s)  Current Patient Status: Inpatient   Certification Statement: The patient will continue to require additional inpatient hospital stay due to fever overnight with suspicion for  aspiration pneumonia vs pneumonitis    Discharge Plan: Anticipate discharge in 24-48 hrs to group home.    Code Status: Level 1 - Full Code    Subjective   Patient was seen sleeping in bed this morning in no acute distress.  Patient did not awake for writer during evaluation, however was resting peacefully.  On physical exam patient was not diaphoretic however was noted to have shallow breathing.  Per nursing overnight patient had fever of 101.2, Tylenol was given and recheck temperature was 99.9.  Patient completed her feet overnight with no difficulties.  It was also noted that patient had no changes to her physical exam overnight, she was not tachypneic and her vitals remained stable other than fever.     Objective     Vitals:   Temp (24hrs), Av.8 °F (37.7 °C), Min:97.4 °F (36.3 °C), Max:101.2 °F (38.4 °C)    Temp:  [97.4 °F (36.3 °C)-101.2 °F (38.4 °C)] 98.3 °F (36.8 °C)  HR:  [56-93] 56  Resp:  [16-20] 16  BP: ()/(54-83) 110/59  SpO2:  [96 %-100 %] 100 %  Body mass index is 20.55 kg/m².     Input and Output Summary (last 24 hours):     Intake/Output Summary (Last 24 hours) at 2024 1432  Last data filed at 2024 1200  Gross per 24 hour   Intake 200 ml   Output --   Net 200 ml       Physical Exam  HENT:      Head: Normocephalic.      Mouth/Throat:      Mouth: Mucous membranes are moist.   Eyes:      Pupils: Pupils are equal, round, and reactive to light.   Cardiovascular:      Rate and Rhythm: Normal rate and regular rhythm.   Pulmonary:      Effort: No respiratory distress.      Breath sounds: Normal breath sounds.      Comments: Saturating well on room air, shallow breathing noted  Abdominal:      Palpations: Abdomen is soft.      Tenderness: There is no abdominal tenderness. There is no guarding.      Comments: PEG tube in place, no erythema or drainage   Musculoskeletal:      Right lower leg: No edema.      Left lower leg: No edema.   Skin:     General: Skin is warm and dry.   Neurological:       Mental Status: Mental status is at baseline.      Comments: Upper and lower extremity contractures.          Lines/Drains:  Lines/Drains/Airways       Active Status       Name Placement date Placement time Site Days    Gastrostomy/Enterostomy  16 Fr. LUQ 08/26/24  1643  LUQ  34                      Lab Results: I have reviewed the following results:    Results from last 7 days   Lab Units 09/30/24  0908   WBC Thousand/uL 5.25   HEMOGLOBIN g/dL 9.3*   HEMATOCRIT % 28.9*   PLATELETS Thousands/uL 178   SEGS PCT % 52   LYMPHO PCT % 37   MONO PCT % 7   EOS PCT % 3     Results from last 7 days   Lab Units 09/29/24  0544 09/26/24  0451 09/25/24  2226   SODIUM mmol/L 136   < > 139   POTASSIUM mmol/L 5.3   < > 4.3   CHLORIDE mmol/L 101   < > 97   CO2 mmol/L 29   < > 35*   BUN mg/dL 29*   < > 34*   CREATININE mg/dL 0.84   < > 1.14   ANION GAP mmol/L 6   < > 7   CALCIUM mg/dL 9.0   < > 10.5*   ALBUMIN g/dL  --   --  3.9   TOTAL BILIRUBIN mg/dL  --   --  0.43   ALK PHOS U/L  --   --  45   ALT U/L  --   --  22   AST U/L  --   --  23   GLUCOSE RANDOM mg/dL 206*   < > 178*    < > = values in this interval not displayed.     Results from last 7 days   Lab Units 09/25/24  2226   INR  0.92     Results from last 7 days   Lab Units 09/30/24  1115 09/30/24  0612 09/29/24  2350 09/29/24  1726 09/29/24  1207 09/29/24  0537 09/28/24  2358 09/28/24  1805 09/28/24  1200 09/28/24  0549 09/28/24  0027 09/27/24  1722   POC GLUCOSE mg/dl 92 212* 217* 141* 155* 224* 120 119 121 158* 161* 135         Results from last 7 days   Lab Units 09/30/24  0828 09/27/24  0533 09/26/24  2139 09/26/24  0451 09/26/24  0201 09/25/24  2226   LACTIC ACID mmol/L  --  1.4 1.0  --  1.5 2.3*   PROCALCITONIN ng/ml 0.08 0.13  --  0.08  --  0.09       Recent Cultures (last 7 days):   Results from last 7 days   Lab Units 09/25/24  2235 09/25/24  2226   BLOOD CULTURE  No Growth After 4 Days. No Growth After 4 Days.       Imaging Review: Reviewed radiology reports  "from this admission including: chest xray.    Chest x ray: 9/30 \"Small right lung opacity may be secondary to rotated positioning. Recommend follow-up PA and lateral chest radiograph when the patient is able.\"    Other Studies: No additional pertinent studies reviewed.    Last 24 Hours Medication List:     Current Facility-Administered Medications:     acetaminophen (TYLENOL) tablet 975 mg, Q6H PRN    ARIPiprazole (ABILIFY) tablet 2 mg, HS    artificial tear ophthalmic ointment, HS    ascorbic acid (VITAMIN C) tablet 125 mg, Daily    baclofen tablet 10 mg, TID    calcium carbonate (TUMS) chewable tablet 500 mg, BID PRN    carbidopa-levodopa (SINEMET)  mg per tablet 2 tablet, BID    Cholecalciferol (VITAMIN D3) tablet 1,000 Units, Daily    citalopram (CeleXA) tablet 20 mg, Daily    enoxaparin (LOVENOX) subcutaneous injection 40 mg, Daily    Ferrous Sulfate oral syrup 300 mg, Every Other Day    guaiFENesin (ROBITUSSIN) oral liquid 200 mg, 4x Daily PRN    insulin regular (HumuLIN R,NovoLIN R) injection 1-5 Units, Q6H    insulin regular (HumuLIN R,NovoLIN R) injection 4 Units, TID    Ketotifen Fumarate (ZADITOR) 0.035 % ophthalmic solution 1 drop, BID    levothyroxine tablet 25 mcg, Early Morning    LORazepam (ATIVAN) tablet 0.5 mg, HS    midodrine (PROAMATINE) tablet 5 mg, TID AC    oxybutynin (DITROPAN) tablet 5 mg, Daily    polyethylene glycol (MIRALAX) packet 17 g, Daily PRN    pravastatin (PRAVACHOL) tablet 40 mg, Daily With Dinner    traZODone (DESYREL) tablet 100 mg, HS    valproic acid (DEPAKENE) oral soln 125 mg, Q12H ROSEMARY    Administrative Statements   Today, Patient Was Seen By: Brooke Mendelson, DO  I have spent a total time of 30 minutes in caring for this patient on the day of the visit/encounter including Diagnostic results, Prognosis, Risks and benefits of tx options, Instructions for management, Patient and family education, Importance of tx compliance, Risk factor reductions, Counseling / " Coordination of care, Documenting in the medical record, Reviewing / ordering tests, medicine, procedures  , Obtaining or reviewing history  , and Communicating with other healthcare professionals . Topics discussed with the patient / family include symptom assessment and management, medication review, medication adjustment, psychosocial support, goals of care, and supportive listening.    **Please Note: This note may have been constructed using a voice recognition system.**

## 2024-09-30 NOTE — ASSESSMENT & PLAN NOTE
Lab Results   Component Value Date    HGB 9.3 (L) 09/30/2024    HGB 9.3 (L) 09/29/2024    HGB 9.3 (L) 09/28/2024     No active bleeding  Continue to monitor  Folate and vitamin B12 within normal limits

## 2024-09-30 NOTE — PLAN OF CARE
Problem: Prexisting or High Potential for Compromised Skin Integrity  Goal: Skin integrity is maintained or improved  Description: INTERVENTIONS:  - Identify patients at risk for skin breakdown  - Assess and monitor skin integrity  - Assess and monitor nutrition and hydration status  - Monitor labs   - Assess for incontinence   - Turn and reposition patient  - Assist with mobility/ambulation  - Relieve pressure over bony prominences  - Avoid friction and shearing  - Provide appropriate hygiene as needed including keeping skin clean and dry  - Evaluate need for skin moisturizer/barrier cream  - Collaborate with interdisciplinary team   - Patient/family teaching  - Consider wound care consult   Outcome: Progressing     Problem: PAIN - ADULT  Goal: Verbalizes/displays adequate comfort level or baseline comfort level  Description: Interventions:  - Encourage patient to monitor pain and request assistance  - Assess pain using appropriate pain scale  - Administer analgesics based on type and severity of pain and evaluate response  - Implement non-pharmacological measures as appropriate and evaluate response  - Consider cultural and social influences on pain and pain management  - Notify physician/advanced practitioner if interventions unsuccessful or patient reports new pain  Outcome: Progressing     Problem: INFECTION - ADULT  Goal: Absence or prevention of progression during hospitalization  Description: INTERVENTIONS:  - Assess and monitor for signs and symptoms of infection  - Monitor lab/diagnostic results  - Monitor all insertion sites, i.e. indwelling lines, tubes, and drains  - Monitor endotracheal if appropriate and nasal secretions for changes in amount and color  - [place] appropriate cooling/warming therapies per order  - Administer medications as ordered  - Instruct and encourage patient and family to use good hand hygiene technique  - Identify and instruct in appropriate isolation precautions for  identified infection/condition  Outcome: Progressing  Goal: Absence of fever/infection during neutropenic period  Description: INTERVENTIONS:  - Monitor WBC    Outcome: Progressing     Problem: SAFETY ADULT  Goal: Patient will remain free of falls  Description: INTERVENTIONS:  - Educate patient/family on patient safety including physical limitations  - Instruct patient to call for assistance with activity   - Consult OT/PT to assist with strengthening/mobility   - Keep Call bell within reach  - Keep bed low and locked with side rails adjusted as appropriate  - Keep care items and personal belongings within reach  - Initiate and maintain comfort rounds  - Make Fall Risk Sign visible to staff  - Offer Toileting every  Hours, in advance of need  - Initiate/Maintain alarm  - Obtain necessary fall risk management equipment  - Apply yellow socks and bracelet for high fall risk patients  - Consider moving patient to room near nurses station  Outcome: Progressing  Goal: Maintain or return to baseline ADL function  Description: INTERVENTIONS:  -  Assess patient's ability to carry out ADLs; assess patient's baseline for ADL function and identify physical deficits which impact ability to perform ADLs (bathing, care of mouth/teeth, toileting, grooming, dressing, etc.)  - Assess/evaluate cause of self-care deficits   - Assess range of motion  - Assess patient's mobility; develop plan if impaired  - Assess patient's need for assistive devices and provide as appropriate  - Encourage maximum independence but intervene and supervise when necessary  - Involve family in performance of ADLs  - Assess for home care needs following discharge   - Consider OT consult to assist with ADL evaluation and planning for discharge  - Provide patient education as appropriate  Outcome: Progressing  Goal: Maintains/Returns to pre admission functional level  Description: INTERVENTIONS:  - Perform AM-PAC 6 Click Basic Mobility/ Daily Activity  assessment daily.  - Set and communicate daily mobility goal to care team and patient/family/caregiver.   - Collaborate with rehabilitation services on mobility goals if consulted  - Perform Range of Motion times a day.  - Reposition patient every  hours.  - Dangle patient  times a day  - Stand patient times a day  - Ambulate patient  times a day  - Out of bed to chair  times a day   - Out of bed for meals times a day  - Out of bed for toileting  - Record patient progress and toleration of activity level   Outcome: Progressing     Problem: DISCHARGE PLANNING  Goal: Discharge to home or other facility with appropriate resources  Description: INTERVENTIONS:  - Identify barriers to discharge w/patient and caregiver  - Arrange for needed discharge resources and transportation as appropriate  - Identify discharge learning needs (meds, wound care, etc.)  - Arrange for interpretive services to assist at discharge as needed  - Refer to Case Management Department for coordinating discharge planning if the patient needs post-hospital services based on physician/advanced practitioner order or complex needs related to functional status, cognitive ability, or social support system  Outcome: Progressing     Problem: Nutrition/Hydration-ADULT  Goal: Nutrient/Hydration intake appropriate for improving, restoring or maintaining nutritional needs  Description: Monitor and assess patient's nutrition/hydration status for malnutrition. Collaborate with interdisciplinary team and initiate plan and interventions as ordered.  Monitor patient's weight and dietary intake as ordered or per policy. Utilize nutrition screening tool and intervene as necessary. Determine patient's food preferences and provide high-protein, high-caloric foods as appropriate.     INTERVENTIONS:  - Monitor oral intake, urinary output, labs, and treatment plans  - Assess nutrition and hydration status and recommend course of action  - Evaluate amount of meals  eaten  - Assist patient with eating if necessary   - Allow adequate time for meals  - Recommend/ encourage appropriate diets, oral nutritional supplements, and vitamin/mineral supplements  - Order, calculate, and assess calorie counts as needed  - Recommend, monitor, and adjust tube feedings and TPN/PPN based on assessed needs  - Assess need for intravenous fluids  - Provide specific nutrition/hydration education as appropriate  - Include patient/family/caregiver in decisions related to nutrition  Outcome: Progressing

## 2024-09-30 NOTE — ASSESSMENT & PLAN NOTE
RESOLVED. Patient was brought in by EMS from rehab facility for a fever, her fever was initially 100.9F, was given Tylenol at the facility which had than dropped to 100 F  - Met SIRS on admission with Temp 102 F.    - LA: 2.3, repeat 9/28 1.4  CXR: Pneumonia inferomedial right lung  Due to recent hospitalization with ICU after reported hypotension earlier this month followed by changes to patient's baseline, CT head negative   Was febrile 9/26 to Tmax 102.5 F  Patient had fever overnight of 101.2 F, tylenol brought fever down to 99.9    Plan:  9/29 last day of 5-day course of IV ceftriaxone to complete.   Discontinued Flagyl  Patient was stable over weekend, afebrile and with no signs or symptoms of infection, plan was to discharge today to group home however due to patient's fever overnight will order chest x-ray and procalcitonin to rule out pneumonia, likely aspiration pneumonia   Pro calcitonin 0.08, Chest x-ray unremarkable, WBC within normal limits  Follow blood cultures (no growth so far)  MRSA culture negative  Trend CBC, and BMP repeat in the AM  Tylenol 975 mg as needed every 6 hours for fevers   Continue monitoring patient overnight

## 2024-09-30 NOTE — PLAN OF CARE
Problem: Prexisting or High Potential for Compromised Skin Integrity  Goal: Skin integrity is maintained or improved  Description: INTERVENTIONS:  - Identify patients at risk for skin breakdown  - Assess and monitor skin integrity  - Assess and monitor nutrition and hydration status  - Monitor labs   - Assess for incontinence   - Turn and reposition patient  - Assist with mobility/ambulation  - Relieve pressure over bony prominences  - Avoid friction and shearing  - Provide appropriate hygiene as needed including keeping skin clean and dry  - Evaluate need for skin moisturizer/barrier cream  - Collaborate with interdisciplinary team   - Patient/family teaching  - Consider wound care consult   Outcome: Progressing     Problem: PAIN - ADULT  Goal: Verbalizes/displays adequate comfort level or baseline comfort level  Description: Interventions:  - Encourage patient to monitor pain and request assistance  - Assess pain using appropriate pain scale  - Administer analgesics based on type and severity of pain and evaluate response  - Implement non-pharmacological measures as appropriate and evaluate response  - Consider cultural and social influences on pain and pain management  - Notify physician/advanced practitioner if interventions unsuccessful or patient reports new pain  Outcome: Progressing     Problem: INFECTION - ADULT  Goal: Absence or prevention of progression during hospitalization  Description: INTERVENTIONS:  - Assess and monitor for signs and symptoms of infection  - Monitor lab/diagnostic results  - Monitor all insertion sites, i.e. indwelling lines, tubes, and drains  - Monitor endotracheal if appropriate and nasal secretions for changes in amount and color  - [place] appropriate cooling/warming therapies per order  - Administer medications as ordered  - Instruct and encourage patient and family to use good hand hygiene technique  - Identify and instruct in appropriate isolation precautions for  identified infection/condition  Outcome: Progressing  Goal: Absence of fever/infection during neutropenic period  Description: INTERVENTIONS:  - Monitor WBC    Outcome: Progressing     Problem: SAFETY ADULT  Goal: Patient will remain free of falls  Description: INTERVENTIONS:  - Educate patient/family on patient safety including physical limitations  - Instruct patient to call for assistance with activity   - Consult OT/PT to assist with strengthening/mobility   - Keep Call bell within reach  - Keep bed low and locked with side rails adjusted as appropriate  - Keep care items and personal belongings within reach  - Initiate and maintain comfort rounds  - Make Fall Risk Sign visible to staff  - Offer Toileting every  Hours, in advance of need  - Initiate/Maintain alarm  - Obtain necessary fall risk management equipment  - Apply yellow socks and bracelet for high fall risk patients  - Consider moving patient to room near nurses station  Outcome: Progressing  Goal: Maintain or return to baseline ADL function  Description: INTERVENTIONS:  -  Assess patient's ability to carry out ADLs; assess patient's baseline for ADL function and identify physical deficits which impact ability to perform ADLs (bathing, care of mouth/teeth, toileting, grooming, dressing, etc.)  - Assess/evaluate cause of self-care deficits   - Assess range of motion  - Assess patient's mobility; develop plan if impaired  - Assess patient's need for assistive devices and provide as appropriate  - Encourage maximum independence but intervene and supervise when necessary  - Involve family in performance of ADLs  - Assess for home care needs following discharge   - Consider OT consult to assist with ADL evaluation and planning for discharge  - Provide patient education as appropriate  Outcome: Progressing  Goal: Maintains/Returns to pre admission functional level  Description: INTERVENTIONS:  - Perform AM-PAC 6 Click Basic Mobility/ Daily Activity  assessment daily.  - Set and communicate daily mobility goal to care team and patient/family/caregiver.   - Collaborate with rehabilitation services on mobility goals if consulted  - Perform Range of Motion times a day.  - Reposition patient every  hours.  - Dangle patient  times a day  - Stand patient times a day  - Ambulate patient  times a day  - Out of bed to chair  times a day   - Out of bed for meals times a day  - Out of bed for toileting  - Record patient progress and toleration of activity level   Outcome: Progressing

## 2024-10-01 ENCOUNTER — TELEPHONE (OUTPATIENT)
Dept: FAMILY MEDICINE CLINIC | Facility: CLINIC | Age: 64
End: 2024-10-01

## 2024-10-01 VITALS
BODY MASS INDEX: 20.64 KG/M2 | SYSTOLIC BLOOD PRESSURE: 108 MMHG | HEART RATE: 91 BPM | OXYGEN SATURATION: 97 % | WEIGHT: 98.33 LBS | RESPIRATION RATE: 12 BRPM | DIASTOLIC BLOOD PRESSURE: 65 MMHG | HEIGHT: 58 IN | TEMPERATURE: 98.9 F

## 2024-10-01 PROBLEM — R50.9 FEVER: Status: RESOLVED | Noted: 2024-09-15 | Resolved: 2024-10-01

## 2024-10-01 PROBLEM — R65.20 SEVERE SEPSIS (HCC): Status: RESOLVED | Noted: 2018-12-17 | Resolved: 2024-10-01

## 2024-10-01 PROBLEM — A41.9 SEVERE SEPSIS (HCC): Status: RESOLVED | Noted: 2018-12-17 | Resolved: 2024-10-01

## 2024-10-01 LAB
ALBUMIN SERPL BCG-MCNC: 3.4 G/DL (ref 3.5–5)
ALP SERPL-CCNC: 39 U/L (ref 34–104)
ALT SERPL W P-5'-P-CCNC: 13 U/L (ref 7–52)
ANION GAP SERPL CALCULATED.3IONS-SCNC: 7 MMOL/L (ref 4–13)
AST SERPL W P-5'-P-CCNC: 37 U/L (ref 13–39)
BACTERIA BLD CULT: NORMAL
BACTERIA BLD CULT: NORMAL
BASOPHILS # BLD AUTO: 0.03 THOUSANDS/ÂΜL (ref 0–0.1)
BASOPHILS NFR BLD AUTO: 1 % (ref 0–1)
BILIRUB SERPL-MCNC: 0.18 MG/DL (ref 0.2–1)
BUN SERPL-MCNC: 29 MG/DL (ref 5–25)
CALCIUM ALBUM COR SERPL-MCNC: 10.3 MG/DL (ref 8.3–10.1)
CALCIUM SERPL-MCNC: 9.8 MG/DL (ref 8.4–10.2)
CHLORIDE SERPL-SCNC: 100 MMOL/L (ref 96–108)
CO2 SERPL-SCNC: 31 MMOL/L (ref 21–32)
CREAT SERPL-MCNC: 0.81 MG/DL (ref 0.6–1.3)
EOSINOPHIL # BLD AUTO: 0.25 THOUSAND/ÂΜL (ref 0–0.61)
EOSINOPHIL NFR BLD AUTO: 5 % (ref 0–6)
ERYTHROCYTE [DISTWIDTH] IN BLOOD BY AUTOMATED COUNT: 13.5 % (ref 11.6–15.1)
GFR SERPL CREATININE-BSD FRML MDRD: 78 ML/MIN/1.73SQ M
GLUCOSE SERPL-MCNC: 155 MG/DL (ref 65–140)
GLUCOSE SERPL-MCNC: 177 MG/DL (ref 65–140)
GLUCOSE SERPL-MCNC: 226 MG/DL (ref 65–140)
GLUCOSE SERPL-MCNC: 227 MG/DL (ref 65–140)
HCT VFR BLD AUTO: 28.9 % (ref 34.8–46.1)
HGB BLD-MCNC: 9.3 G/DL (ref 11.5–15.4)
IMM GRANULOCYTES # BLD AUTO: 0.02 THOUSAND/UL (ref 0–0.2)
IMM GRANULOCYTES NFR BLD AUTO: 0 % (ref 0–2)
LYMPHOCYTES # BLD AUTO: 1.3 THOUSANDS/ÂΜL (ref 0.6–4.47)
LYMPHOCYTES NFR BLD AUTO: 24 % (ref 14–44)
MCH RBC QN AUTO: 33.5 PG (ref 26.8–34.3)
MCHC RBC AUTO-ENTMCNC: 32.2 G/DL (ref 31.4–37.4)
MCV RBC AUTO: 104 FL (ref 82–98)
MONOCYTES # BLD AUTO: 0.46 THOUSAND/ÂΜL (ref 0.17–1.22)
MONOCYTES NFR BLD AUTO: 9 % (ref 4–12)
NEUTROPHILS # BLD AUTO: 3.3 THOUSANDS/ÂΜL (ref 1.85–7.62)
NEUTS SEG NFR BLD AUTO: 61 % (ref 43–75)
NRBC BLD AUTO-RTO: 0 /100 WBCS
PLATELET # BLD AUTO: 183 THOUSANDS/UL (ref 149–390)
PMV BLD AUTO: 12.9 FL (ref 8.9–12.7)
POTASSIUM SERPL-SCNC: 4.8 MMOL/L (ref 3.5–5.3)
PROT SERPL-MCNC: 6.2 G/DL (ref 6.4–8.4)
RBC # BLD AUTO: 2.78 MILLION/UL (ref 3.81–5.12)
SODIUM SERPL-SCNC: 138 MMOL/L (ref 135–147)
WBC # BLD AUTO: 5.36 THOUSAND/UL (ref 4.31–10.16)

## 2024-10-01 PROCEDURE — 99239 HOSP IP/OBS DSCHRG MGMT >30: CPT | Performed by: HOSPITALIST

## 2024-10-01 PROCEDURE — 82948 REAGENT STRIP/BLOOD GLUCOSE: CPT

## 2024-10-01 PROCEDURE — 85025 COMPLETE CBC W/AUTO DIFF WBC: CPT

## 2024-10-01 PROCEDURE — 80053 COMPREHEN METABOLIC PANEL: CPT

## 2024-10-01 RX ORDER — CARBIDOPA AND LEVODOPA 25; 100 MG/1; MG/1
2 TABLET ORAL 2 TIMES DAILY
Status: CANCELLED
Start: 2024-10-01

## 2024-10-01 RX ORDER — NICOTINE POLACRILEX 4 MG
15 LOZENGE BUCCAL 4 TIMES DAILY PRN
Qty: 12.5 G | Refills: 1 | Status: SHIPPED | OUTPATIENT
Start: 2024-10-01 | End: 2024-10-04

## 2024-10-01 RX ORDER — TRAZODONE HYDROCHLORIDE 100 MG/1
100 TABLET ORAL
Start: 2024-10-01

## 2024-10-01 RX ORDER — METHENAMINE HIPPURATE 1000 MG/1
TABLET ORAL
Start: 2024-10-01

## 2024-10-01 RX ORDER — PHENOL 1.4 %
600 AEROSOL, SPRAY (ML) MUCOUS MEMBRANE 2 TIMES DAILY
Start: 2024-10-01

## 2024-10-01 RX ORDER — GLUCAGON INJECTION, SOLUTION 0.5 MG/.1ML
1 INJECTION, SOLUTION SUBCUTANEOUS
Qty: 0.2 ML | Refills: 0 | Status: SHIPPED | OUTPATIENT
Start: 2024-10-01 | End: 2024-10-04

## 2024-10-01 RX ORDER — CITALOPRAM HYDROBROMIDE 20 MG/1
20 TABLET ORAL DAILY
Qty: 30 TABLET | Refills: 1 | Status: SHIPPED | OUTPATIENT
Start: 2024-10-02

## 2024-10-01 RX ORDER — FERROUS SULFATE 300 MG/5ML
300 LIQUID (ML) ORAL EVERY OTHER DAY
Qty: 38 ML | Refills: 1 | Status: SHIPPED | OUTPATIENT
Start: 2024-10-02

## 2024-10-01 RX ORDER — OXYBUTYNIN CHLORIDE 5 MG/1
5 TABLET ORAL 3 TIMES DAILY
Start: 2024-10-01

## 2024-10-01 RX ORDER — PRAVASTATIN SODIUM 40 MG
40 TABLET ORAL
Status: CANCELLED
Start: 2024-10-01

## 2024-10-01 RX ORDER — VALPROIC ACID 250 MG/5ML
125 SOLUTION ORAL 2 TIMES DAILY
Qty: 473 ML | Refills: 1 | Status: SHIPPED | OUTPATIENT
Start: 2024-10-01

## 2024-10-01 RX ORDER — POLYETHYLENE GLYCOL 3350 17 G/17G
17 POWDER, FOR SOLUTION ORAL DAILY PRN
Status: CANCELLED
Start: 2024-10-01

## 2024-10-01 RX ORDER — MIDODRINE HYDROCHLORIDE 5 MG/1
5 TABLET ORAL 3 TIMES DAILY
Qty: 90 TABLET | Refills: 1 | Status: SHIPPED | OUTPATIENT
Start: 2024-10-01

## 2024-10-01 RX ORDER — LORAZEPAM 0.5 MG/1
0.5 TABLET ORAL
Start: 2024-10-01 | End: 2024-10-01

## 2024-10-01 RX ORDER — LORAZEPAM 0.5 MG/1
0.5 TABLET ORAL
Status: CANCELLED
Start: 2024-10-01 | End: 2024-10-11

## 2024-10-01 RX ORDER — SIMVASTATIN 20 MG
20 TABLET ORAL DAILY
Start: 2024-10-01 | End: 2024-10-10 | Stop reason: SDUPTHER

## 2024-10-01 RX ORDER — MINERAL OIL AND PETROLATUM 150; 830 MG/G; MG/G
OINTMENT OPHTHALMIC
Status: CANCELLED
Start: 2024-10-01

## 2024-10-01 RX ORDER — LORAZEPAM 0.5 MG/1
0.5 TABLET ORAL
Start: 2024-10-01 | End: 2024-10-11

## 2024-10-01 RX ORDER — KETOTIFEN FUMARATE 0.35 MG/ML
1 SOLUTION/ DROPS OPHTHALMIC 2 TIMES DAILY
Status: CANCELLED
Start: 2024-10-01

## 2024-10-01 RX ORDER — LEVOTHYROXINE SODIUM 25 UG/1
25 TABLET ORAL DAILY
Start: 2024-10-01

## 2024-10-01 RX ORDER — GUAIFENESIN 200 MG/10ML
200 LIQUID ORAL 4 TIMES DAILY PRN
Start: 2024-10-01

## 2024-10-01 RX ORDER — ACETAMINOPHEN 325 MG/1
975 TABLET ORAL EVERY 6 HOURS PRN
Status: CANCELLED
Start: 2024-10-01

## 2024-10-01 RX ADMIN — OXYBUTYNIN CHLORIDE 5 MG: 5 TABLET ORAL at 09:00

## 2024-10-01 RX ADMIN — BACLOFEN 10 MG: 10 TABLET ORAL at 09:00

## 2024-10-01 RX ADMIN — Medication 1000 UNITS: at 09:00

## 2024-10-01 RX ADMIN — LEVOTHYROXINE SODIUM 25 MCG: 25 TABLET ORAL at 06:31

## 2024-10-01 RX ADMIN — CARBIDOPA AND LEVODOPA 2 TABLET: 25; 100 TABLET ORAL at 09:00

## 2024-10-01 RX ADMIN — INSULIN HUMAN 4 UNITS: 100 INJECTION, SOLUTION PARENTERAL at 06:43

## 2024-10-01 RX ADMIN — Medication 125 MG: at 09:01

## 2024-10-01 RX ADMIN — INSULIN HUMAN 4 UNITS: 100 INJECTION, SOLUTION PARENTERAL at 00:46

## 2024-10-01 RX ADMIN — ENOXAPARIN SODIUM 40 MG: 40 INJECTION SUBCUTANEOUS at 09:05

## 2024-10-01 RX ADMIN — MIDODRINE HYDROCHLORIDE 5 MG: 5 TABLET ORAL at 11:23

## 2024-10-01 RX ADMIN — INSULIN HUMAN 1 UNITS: 100 INJECTION, SOLUTION PARENTERAL at 00:45

## 2024-10-01 RX ADMIN — INSULIN HUMAN 2 UNITS: 100 INJECTION, SOLUTION PARENTERAL at 06:43

## 2024-10-01 RX ADMIN — VALPROIC ACID 125 MG: 250 SOLUTION ORAL at 09:00

## 2024-10-01 RX ADMIN — MIDODRINE HYDROCHLORIDE 5 MG: 5 TABLET ORAL at 06:31

## 2024-10-01 RX ADMIN — INSULIN HUMAN 1 UNITS: 100 INJECTION, SOLUTION PARENTERAL at 11:23

## 2024-10-01 RX ADMIN — CITALOPRAM HYDROBROMIDE 20 MG: 20 TABLET ORAL at 09:01

## 2024-10-01 RX ADMIN — KETOTIFEN FUMARATE 1 DROP: 0.25 SOLUTION OPHTHALMIC at 09:05

## 2024-10-01 NOTE — ASSESSMENT & PLAN NOTE
RESOLVED. Patient was brought in by EMS from rehab facility for a fever, her fever was initially 100.9F, was given Tylenol at the facility which had than dropped to 100 F  - Met SIRS on admission with Temp 102 F.    - LA: 2.3, repeat 9/28 1.4  CXR: Pneumonia inferomedial right lung  Due to recent hospitalization with ICU after reported hypotension earlier this month followed by changes to patient's baseline, CT head negative   Was febrile 9/26 to Tmax 102.5 F  Patient was afebrile overnight    Plan:  9/29 last day of 5-day course of IV ceftriaxone to complete.   Discontinued Flagyl  Patient was stable over weekend, afebrile and with no signs or symptoms of infection, plan was to discharge today to group home however due to patient's fever overnight will order chest x-ray and procalcitonin to rule out pneumonia, likely aspiration pneumonia   Pro calcitonin 0.08, Chest x-ray unremarkable, WBC within normal limits  Follow blood cultures (no growth so far)  MRSA culture negative  Trend CBC, and BMP repeat in the AM  Tylenol 975 mg as needed every 6 hours for fevers   Continue monitoring, patient afebrile overnight

## 2024-10-01 NOTE — PLAN OF CARE
Problem: Prexisting or High Potential for Compromised Skin Integrity  Goal: Skin integrity is maintained or improved  Description: INTERVENTIONS:  - Identify patients at risk for skin breakdown  - Assess and monitor skin integrity  - Assess and monitor nutrition and hydration status  - Monitor labs   - Assess for incontinence   - Turn and reposition patient  - Assist with mobility/ambulation  - Relieve pressure over bony prominences  - Avoid friction and shearing  - Provide appropriate hygiene as needed including keeping skin clean and dry  - Evaluate need for skin moisturizer/barrier cream  - Collaborate with interdisciplinary team   - Patient/family teaching  - Consider wound care consult   Outcome: Progressing     Problem: PAIN - ADULT  Goal: Verbalizes/displays adequate comfort level or baseline comfort level  Description: Interventions:  - Encourage patient to monitor pain and request assistance  - Assess pain using appropriate pain scale  - Administer analgesics based on type and severity of pain and evaluate response  - Implement non-pharmacological measures as appropriate and evaluate response  - Consider cultural and social influences on pain and pain management  - Notify physician/advanced practitioner if interventions unsuccessful or patient reports new pain  Outcome: Progressing     Problem: INFECTION - ADULT  Goal: Absence or prevention of progression during hospitalization  Description: INTERVENTIONS:  - Assess and monitor for signs and symptoms of infection  - Monitor lab/diagnostic results  - Monitor all insertion sites, i.e. indwelling lines, tubes, and drains  - Monitor endotracheal if appropriate and nasal secretions for changes in amount and color  - [place] appropriate cooling/warming therapies per order  - Administer medications as ordered  - Instruct and encourage patient and family to use good hand hygiene technique  - Identify and instruct in appropriate isolation precautions for  identified infection/condition  Outcome: Progressing  Goal: Absence of fever/infection during neutropenic period  Description: INTERVENTIONS:  - Monitor WBC    Outcome: Progressing     Problem: SAFETY ADULT  Goal: Patient will remain free of falls  Description: INTERVENTIONS:  - Educate patient/family on patient safety including physical limitations  - Instruct patient to call for assistance with activity   - Consult OT/PT to assist with strengthening/mobility   - Keep Call bell within reach  - Keep bed low and locked with side rails adjusted as appropriate  - Keep care items and personal belongings within reach  - Initiate and maintain comfort rounds  - Make Fall Risk Sign visible to staff  - Offer Toileting every  Hours, in advance of need  - Initiate/Maintain alarm  - Obtain necessary fall risk management equipment  - Apply yellow socks and bracelet for high fall risk patients  - Consider moving patient to room near nurses station  Outcome: Progressing  Goal: Maintain or return to baseline ADL function  Description: INTERVENTIONS:  -  Assess patient's ability to carry out ADLs; assess patient's baseline for ADL function and identify physical deficits which impact ability to perform ADLs (bathing, care of mouth/teeth, toileting, grooming, dressing, etc.)  - Assess/evaluate cause of self-care deficits   - Assess range of motion  - Assess patient's mobility; develop plan if impaired  - Assess patient's need for assistive devices and provide as appropriate  - Encourage maximum independence but intervene and supervise when necessary  - Involve family in performance of ADLs  - Assess for home care needs following discharge   - Consider OT consult to assist with ADL evaluation and planning for discharge  - Provide patient education as appropriate  Outcome: Progressing  Goal: Maintains/Returns to pre admission functional level  Description: INTERVENTIONS:  - Perform AM-PAC 6 Click Basic Mobility/ Daily Activity  assessment daily.  - Set and communicate daily mobility goal to care team and patient/family/caregiver.   - Collaborate with rehabilitation services on mobility goals if consulted  - Perform Range of Motion times a day.  - Reposition patient every  hours.  - Dangle patient  times a day  - Stand patient times a day  - Ambulate patient  times a day  - Out of bed to chair  times a day   - Out of bed for meals times a day  - Out of bed for toileting  - Record patient progress and toleration of activity level   Outcome: Progressing     Problem: DISCHARGE PLANNING  Goal: Discharge to home or other facility with appropriate resources  Description: INTERVENTIONS:  - Identify barriers to discharge w/patient and caregiver  - Arrange for needed discharge resources and transportation as appropriate  - Identify discharge learning needs (meds, wound care, etc.)  - Arrange for interpretive services to assist at discharge as needed  - Refer to Case Management Department for coordinating discharge planning if the patient needs post-hospital services based on physician/advanced practitioner order or complex needs related to functional status, cognitive ability, or social support system  Outcome: Progressing     Problem: Nutrition/Hydration-ADULT  Goal: Nutrient/Hydration intake appropriate for improving, restoring or maintaining nutritional needs  Description: Monitor and assess patient's nutrition/hydration status for malnutrition. Collaborate with interdisciplinary team and initiate plan and interventions as ordered.  Monitor patient's weight and dietary intake as ordered or per policy. Utilize nutrition screening tool and intervene as necessary. Determine patient's food preferences and provide high-protein, high-caloric foods as appropriate.     INTERVENTIONS:  - Monitor oral intake, urinary output, labs, and treatment plans  - Assess nutrition and hydration status and recommend course of action  - Evaluate amount of meals  eaten  - Assist patient with eating if necessary   - Allow adequate time for meals  - Recommend/ encourage appropriate diets, oral nutritional supplements, and vitamin/mineral supplements  - Order, calculate, and assess calorie counts as needed  - Recommend, monitor, and adjust tube feedings and TPN/PPN based on assessed needs  - Assess need for intravenous fluids  - Provide specific nutrition/hydration education as appropriate  - Include patient/family/caregiver in decisions related to nutrition  Outcome: Progressing

## 2024-10-01 NOTE — ASSESSMENT & PLAN NOTE
Lab Results   Component Value Date    HGBA1C 6.2 (H) 08/13/2024     Recent Labs     09/30/24  1718 10/01/24  0027 10/01/24  0632 10/01/24  1115   POCGLU 115 155* 226* 177*     Blood Sugar Average: Last 72 hrs:  (P) [id number]  Was receiving home regular insulin 4 units every 6 hours. Tube feeds run 12 hours (6pm-6am). Noon doses previously held due to hypoglycemia. Continue 4 units regular insulin at 6 PM, midnight, and 6 AM during tube feeds. Continue q6 SSI. Monitor blood sugars.   Nutrition consulted, appreciate recommendations in regards to tube feeds  - Increase free water flushes to 170 mL to increase daily water intake during tube feeds, discussed with nutrition

## 2024-10-01 NOTE — TELEPHONE ENCOUNTER
Signature [facility]     [facility]     [facility] [address]     Fax [phone]

## 2024-10-01 NOTE — PLAN OF CARE
Problem: Prexisting or High Potential for Compromised Skin Integrity  Goal: Skin integrity is maintained or improved  Description: INTERVENTIONS:  - Identify patients at risk for skin breakdown  - Assess and monitor skin integrity  - Assess and monitor nutrition and hydration status  - Monitor labs   - Assess for incontinence   - Turn and reposition patient  - Assist with mobility/ambulation  - Relieve pressure over bony prominences  - Avoid friction and shearing  - Provide appropriate hygiene as needed including keeping skin clean and dry  - Evaluate need for skin moisturizer/barrier cream  - Collaborate with interdisciplinary team   - Patient/family teaching  - Consider wound care consult   Outcome: Progressing     Problem: PAIN - ADULT  Goal: Verbalizes/displays adequate comfort level or baseline comfort level  Description: Interventions:  - Encourage patient to monitor pain and request assistance  - Assess pain using appropriate pain scale  - Administer analgesics based on type and severity of pain and evaluate response  - Implement non-pharmacological measures as appropriate and evaluate response  - Consider cultural and social influences on pain and pain management  - Notify physician/advanced practitioner if interventions unsuccessful or patient reports new pain  Outcome: Progressing     Problem: INFECTION - ADULT  Goal: Absence or prevention of progression during hospitalization  Description: INTERVENTIONS:  - Assess and monitor for signs and symptoms of infection  - Monitor lab/diagnostic results  - Monitor all insertion sites, i.e. indwelling lines, tubes, and drains  - Monitor endotracheal if appropriate and nasal secretions for changes in amount and color  - [place] appropriate cooling/warming therapies per order  - Administer medications as ordered  - Instruct and encourage patient and family to use good hand hygiene technique  - Identify and instruct in appropriate isolation precautions for  identified infection/condition  Outcome: Progressing  Goal: Absence of fever/infection during neutropenic period  Description: INTERVENTIONS:  - Monitor WBC    Outcome: Progressing     Problem: SAFETY ADULT  Goal: Patient will remain free of falls  Description: INTERVENTIONS:  - Educate patient/family on patient safety including physical limitations  - Instruct patient to call for assistance with activity   - Consult OT/PT to assist with strengthening/mobility   - Keep Call bell within reach  - Keep bed low and locked with side rails adjusted as appropriate  - Keep care items and personal belongings within reach  - Initiate and maintain comfort rounds  - Make Fall Risk Sign visible to staff  - Offer Toileting every  Hours, in advance of need  - Initiate/Maintain alarm  - Obtain necessary fall risk management equipment  - Apply yellow socks and bracelet for high fall risk patients  - Consider moving patient to room near nurses station  Outcome: Progressing  Goal: Maintain or return to baseline ADL function  Description: INTERVENTIONS:  -  Assess patient's ability to carry out ADLs; assess patient's baseline for ADL function and identify physical deficits which impact ability to perform ADLs (bathing, care of mouth/teeth, toileting, grooming, dressing, etc.)  - Assess/evaluate cause of self-care deficits   - Assess range of motion  - Assess patient's mobility; develop plan if impaired  - Assess patient's need for assistive devices and provide as appropriate  - Encourage maximum independence but intervene and supervise when necessary  - Involve family in performance of ADLs  - Assess for home care needs following discharge   - Consider OT consult to assist with ADL evaluation and planning for discharge  - Provide patient education as appropriate  Outcome: Progressing  Goal: Maintains/Returns to pre admission functional level  Description: INTERVENTIONS:  - Perform AM-PAC 6 Click Basic Mobility/ Daily Activity  assessment daily.  - Set and communicate daily mobility goal to care team and patient/family/caregiver.   - Collaborate with rehabilitation services on mobility goals if consulted  - Perform Range of Motion times a day.  - Reposition patient every  hours.  - Dangle patient  times a day  - Stand patient times a day  - Ambulate patient  times a day  - Out of bed to chair  times a day   - Out of bed for meals times a day  - Out of bed for toileting  - Record patient progress and toleration of activity level   Outcome: Progressing     Problem: DISCHARGE PLANNING  Goal: Discharge to home or other facility with appropriate resources  Description: INTERVENTIONS:  - Identify barriers to discharge w/patient and caregiver  - Arrange for needed discharge resources and transportation as appropriate  - Identify discharge learning needs (meds, wound care, etc.)  - Arrange for interpretive services to assist at discharge as needed  - Refer to Case Management Department for coordinating discharge planning if the patient needs post-hospital services based on physician/advanced practitioner order or complex needs related to functional status, cognitive ability, or social support system  Outcome: Progressing     Problem: Nutrition/Hydration-ADULT  Goal: Nutrient/Hydration intake appropriate for improving, restoring or maintaining nutritional needs  Description: Monitor and assess patient's nutrition/hydration status for malnutrition. Collaborate with interdisciplinary team and initiate plan and interventions as ordered.  Monitor patient's weight and dietary intake as ordered or per policy. Utilize nutrition screening tool and intervene as necessary. Determine patient's food preferences and provide high-protein, high-caloric foods as appropriate.     INTERVENTIONS:  - Monitor oral intake, urinary output, labs, and treatment plans  - Assess nutrition and hydration status and recommend course of action  - Evaluate amount of meals  eaten  - Assist patient with eating if necessary   - Allow adequate time for meals  - Recommend/ encourage appropriate diets, oral nutritional supplements, and vitamin/mineral supplements  - Order, calculate, and assess calorie counts as needed  - Recommend, monitor, and adjust tube feedings and TPN/PPN based on assessed needs  - Assess need for intravenous fluids  - Provide specific nutrition/hydration education as appropriate  - Include patient/family/caregiver in decisions related to nutrition  Outcome: Progressing

## 2024-10-01 NOTE — CASE MANAGEMENT
Case Management Discharge Planning Note    Patient name Terrie Alicea  Location W /W -01 MRN [id number]  : 1960 Date 10/1/2024       Current Admission Date: 2024  Current Admission Diagnosis:Pneumonia   Patient Active Problem List    Diagnosis Date Noted Date Diagnosed    Mood disturbance 2024     Mood disturbances 2024     Severe protein-calorie malnutrition (HCC) 2024     Functional quadriplegia (HCC) 2024     Protein-calorie malnutrition, unspecified severity (HCC) 2024     Hospital discharge follow-up 2024     SIRS (systemic inflammatory response syndrome) ([facility]) 2024     Developmental delay      Preop examination 2023     Cataract 2023     Internal hemorrhoids 2023     Contracture of right hand 2023     Need for shingles vaccine 2023     Viral illness 2022     Dystonic cerebral palsy (HCC) 2022     Cervical dystonia 2022     Parkinson's disease ([facility]) 2022     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[facility]) 10/18/2021     Polyneuropathy associated with underlying disease ([facility]) 10/18/2021     Hypercalcemia 2021     Herpes zoster without complication 2021     Abnormal finding on lung imaging 2021     Anemia 2020     Eosinophilic leukocytosis 2020     Underweight 2020     Dysphagia 2020     History of vitamin D deficiency 2020     History of fall 10/28/2019     Breast asymmetry 2019     Hypernatremia 2018     Pneumonia 2018     Gait disturbance 2018     Osteoarthritis of both hips 2018     Severe Intellectual disability 2018     Diabetes mellitus type 2, insulin dependent (HCC) 2018     Abnormal albumin 2017     Peripheral neuropathy 2017     Seasonal allergies 2017     Physical deconditioning 2017     Hyperlipidemia 2017     Gastroesophageal reflux disease 2017     Cerebral palsy ([facility])  03/27/2017     Spasticity 03/27/2017     Ambulatory dysfunction 03/27/2017     Bipolar disorder (HCC) 03/27/2017     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/2016     Weight loss 01/21/2016     Osteoporosis 01/19/2016     Resting tremor 01/19/2016     Acute metabolic encephalopathy 12/11/2015     Gait abnormality 12/11/2015     Other chronic pain 07/30/2015     Postmenopausal vaginal bleeding 07/23/2015     Anxiety 07/15/2015     Menopause 09/25/2014     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/28/2014     Atopic dermatitis 06/02/2014     Dry eye 06/02/2014     Constipation 04/12/2013     Iron deficiency anemia 03/27/2013     Urinary incontinence 03/27/2013     Hypothyroidism 01/10/2013       LOS (days): 6  Geometric Mean LOS (GMLOS) (days):   Days to GMLOS:     OBJECTIVE:  Risk of Unplanned Readmission Score: 37.38         Current admission status: Inpatient   Preferred Pharmacy:   [facility] - [address]  [address]  [address]  [place] [address]  Phone: [phone] Fax: [phone]    [facility]  [address]  [place] [address]  Phone: [phone] Fax: [phone]    [facility] - [place], [place] - [address]  [address]  [facility] [address]  Phone: [phone] Fax: [phone]    [facility] [place] - [place] - [address]  [address]  [place] [address]  Phone: [phone] Fax: [phone]    [place], [place] - [address]  [address] [facility] [address]  Phone: [phone] Fax: [phone]    Primary Care Provider: Gunnar Sylvester DO    Primary Insurance: [employer] MEDICARE  REP  Secondary Insurance: [place] [employer]    DISCHARGE DETAILS:    Discharge planning discussed with:: Patient caregiver  Freedom of Choice: Yes  Comments - Freedom of Choice: Return to group home  CM contacted family/caregiver?:  Yes (Caregiver via phone, VM left w/ POA)  Were Treatment Team discharge recommendations reviewed with patient/caregiver?: Yes  Did patient/caregiver verbalize understanding of patient care needs?: N/A- going to facility  Were patient/caregiver advised of the risks associated with not following Treatment Team discharge recommendations?: Yes    Contacts  Patient Contacts: Lisa Penny (Care Giver)  Relationship to Patient:: Other (Comment) (Group home director)  Contact Method: Phone  Phone Number: [phone]  Reason/Outcome: Discharge Planning, Emergency Contact, Continuity of Care    Requested Home Health Care         Is the patient interested in HHC at discharge?: No    DME Referral Provided  Referral made for DME?: No    Other Referral/Resources/Interventions Provided:  Interventions: Other (Specify)  Referral Comments: CM received notification that pt is medically stable for dc today back to group home. Pt TF changed to having increased fluid bolus. CM placed call to Donna Tipton ([phone]) from [facility] to verify if updated order needs to be completed. As per Donna, if group [place] is comfortable with changing the settings themselves an updated script is not needed. CM placed call to group home director Lisa to verify same. As per Lisa, she is comfortable to updating the settings. Lisa agreeable to pt returning to group home today. Lisa reported she will be at the hospital this afternoon and can provide transportation. KOBY placed call to pt POA Atty Ezra Morgan to review IMM and notify him of pt dc today. No answer, VM left with above and to return call if any questions or concerns regarding dc. CM will remain available.    Treatment Team Recommendation: Group Home  Discharge Destination Plan:: Group Home  Transport at Discharge : Other (Comment)        Transported by (Company and Unit #): Group home  ETA of Transport (Date): 10/01/24    IMM Given (Date):: 10/01/24  IMM Given  to:: Designee  Family notified:: Ella Morgan  ..IMM reviewed with patient's caregiver, patient's caregiver agrees with discharge determination.

## 2024-10-01 NOTE — PROGRESS NOTES
Progress Note - Hospitalist   Name: Terrie Alicea 64 y.o. female I MRN: [id number]  Unit/Bed#: W -01 I Date of Admission: 9/25/2024   Date of Service: 10/1/2024 I Hospital Day: 6    Assessment & Plan  Severe sepsis (HCC) (Resolved: 10/1/2024)  RESOLVED. Patient was brought in by EMS from rehab facility for a fever, her fever was initially 100.9F, was given Tylenol at the facility which had than dropped to 100 F  - Met SIRS on admission with Temp 102 F.    - LA: 2.3, repeat 9/28 1.4  CXR: Pneumonia inferomedial right lung  Due to recent hospitalization with ICU after reported hypotension earlier this month followed by changes to patient's baseline, CT head negative   Was febrile 9/26 to Tmax 102.5 F  Patient was afebrile overnight    Plan:  9/29 last day of 5-day course of IV ceftriaxone to complete.   Discontinued Flagyl  Patient was stable over weekend, afebrile and with no signs or symptoms of infection, plan was to discharge today to group home however due to patient's fever overnight will order chest x-ray and procalcitonin to rule out pneumonia, likely aspiration pneumonia   Pro calcitonin 0.08, Chest x-ray unremarkable, WBC within normal limits  Follow blood cultures (no growth so far)  MRSA culture negative  Trend CBC, and BMP repeat in the AM  Tylenol 975 mg as needed every 6 hours for fevers   Continue monitoring, patient afebrile overnight   Pneumonia  -She was recently admitted for aspiration pneumonia in August treated with ceftriaxone, and discharged on Vantin  CXR this admission: inferomedial right lung pneumonia. No signs of abscess or putrid smelling breath  Repeat chest x ray unremarkable 9/30    Plan:  See plan for sepsis   Diabetes mellitus type 2, insulin dependent (HCC)  Lab Results   Component Value Date    HGBA1C 6.2 (H) 08/13/2024     Recent Labs     09/30/24  1718 10/01/24  0027 10/01/24  0632 10/01/24  1115   POCGLU 115 155* 226* 177*     Blood Sugar Average: Last 72 hrs:  (P)  [id number]  Was receiving home regular insulin 4 units every 6 hours. Tube feeds run 12 hours (6pm-6am). Noon doses previously held due to hypoglycemia. Continue 4 units regular insulin at 6 PM, midnight, and 6 AM during tube feeds. Continue q6 SSI. Monitor blood sugars.   Nutrition consulted, appreciate recommendations in regards to tube feeds  - Increase free water flushes to 170 mL to increase daily water intake during tube feeds, discussed with nutrition   Parkinson's disease (HCC)  Continue home Sinemet  Urinary incontinence  Continue home oxybutynin  Hypothyroidism  Continue home levothyroxine   Mood disturbance  Continue home abilify, citalopram, and trazodone   Anemia  Lab Results   Component Value Date    HGB 9.3 (L) 10/01/2024    HGB 9.3 (L) 09/30/2024    HGB 9.3 (L) 09/29/2024     No active bleeding  Continue to monitor  Folate and vitamin B12 within normal limits    Fever (Resolved: 10/1/2024)  Please see plan above    VTE Pharmacologic Prophylaxis: VTE Score: 6 High Risk (Score >/= 5) - Pharmacological DVT Prophylaxis Ordered: enoxaparin (Lovenox). Sequential Compression Devices Ordered.    Mobility:   Basic Mobility Inpatient Raw Score: 6  JH-HLM Goal: 2: Bed activities/Dependent transfer  JH-HLM Achieved: 1: Laying in bed  JH-HLM Goal NOT achieved. Continue with multidisciplinary rounding and encourage appropriate mobility to improve upon JH-HLM goals.    Patient Centered Rounds: I performed bedside rounds with nursing staff today.   Discussions with Specialists or Other Care Team Provider: Nutrition    Education and Discussions with Family / Patient: Updated  (caregiver Lisa) at bedside. Attempted to call POA but was unable to get a hold of him.     Current Length of Stay: 6 day(s)  Current Patient Status: Inpatient   Certification Statement:  Plan to be discharged this afternoon  Discharge Plan: Anticipate discharge later today or tomorrow to group home.    Code Status:  Level 1 - Full Code    Subjective   Patient was seen and evaluated this morning while she was awake and alert.  Patient was laying in bed on her side, receiving tube feeds, in no acute distress. She continues to be nonverbal however tracts writer with her eyes as I walked around the room. She does not appear to be in any pain and is not diaphoretic at this time. Per nursing shift, patient tolerated tube feeds overnight without complication and her bed has remained above 30 degrees at least overnight. They have been turning her every 2 hours due to sacral sores and have been flushing medications with her tube feeds with about 90 mL of fluids.     Objective     Vitals:   Temp (24hrs), Av.6 °F (37 °C), Min:98.3 °F (36.8 °C), Max:98.9 °F (37.2 °C)    Temp:  [98.3 °F (36.8 °C)-98.9 °F (37.2 °C)] 98.9 °F (37.2 °C)  HR:  [58-91] 91  Resp:  [12] 12  BP: (108-120)/(46-65) 108/65  SpO2:  [97 %-98 %] 97 %  Body mass index is 20.55 kg/m².     Input and Output Summary (last 24 hours):     Intake/Output Summary (Last 24 hours) at 10/1/2024 1641  Last data filed at 10/1/2024 1300  Gross per 24 hour   Intake 100 ml   Output 410 ml   Net -310 ml       Physical Exam  HENT:      Head: Normocephalic.      Mouth/Throat:      Mouth: Mucous membranes are moist.   Eyes:      Pupils: Pupils are equal, round, and reactive to light.   Cardiovascular:      Rate and Rhythm: Normal rate and regular rhythm.   Pulmonary:      Effort: No respiratory distress.      Breath sounds: Normal breath sounds.      Comments: Saturating well on room air, shallow breathing noted  Abdominal:      Palpations: Abdomen is soft.      Tenderness: There is no abdominal tenderness. There is no guarding.      Comments: PEG tube in place, no erythema or drainage   Musculoskeletal:      Right lower leg: No edema.      Left lower leg: No edema.   Skin:     General: Skin is warm and dry.   Neurological:      Mental Status: Mental status is at baseline.      Comments:  Upper and lower extremity contractures.          Lines/Drains: Peg tube        Lab Results: I have reviewed the following results: none   Results from last 7 days   Lab Units 10/01/24  0638   WBC Thousand/uL 5.36   HEMOGLOBIN g/dL 9.3*   HEMATOCRIT % 28.9*   PLATELETS Thousands/uL 183   SEGS PCT % 61   LYMPHO PCT % 24   MONO PCT % 9   EOS PCT % 5     Results from last 7 days   Lab Units 10/01/24  0638   SODIUM mmol/L 138   POTASSIUM mmol/L 4.8   CHLORIDE mmol/L 100   CO2 mmol/L 31   BUN mg/dL 29*   CREATININE mg/dL 0.81   ANION GAP mmol/L 7   CALCIUM mg/dL 9.8   ALBUMIN g/dL 3.4*   TOTAL BILIRUBIN mg/dL 0.18*   ALK PHOS U/L 39   ALT U/L 13   AST U/L 37   GLUCOSE RANDOM mg/dL 227*     Results from last 7 days   Lab Units 09/25/24  2226   INR  0.92     Results from last 7 days   Lab Units 10/01/24  1115 10/01/24  0632 10/01/24  0027 09/30/24  1718 09/30/24  1115 09/30/24  0612 09/29/24  2350 09/29/24  1726 09/29/24  1207 09/29/24  0537 09/28/24  2358 09/28/24  1805   POC GLUCOSE mg/dl 177* 226* 155* 115 92 212* 217* 141* 155* 224* 120 119         Results from last 7 days   Lab Units 09/30/24  0828 09/27/24  0533 09/26/24  2139 09/26/24  0451 09/26/24  0201 09/25/24  2226   LACTIC ACID mmol/L  --  1.4 1.0  --  1.5 2.3*   PROCALCITONIN ng/ml 0.08 0.13  --  0.08  --  0.09       Recent Cultures (last 7 days):   Results from last 7 days   Lab Units 09/25/24  2235 09/25/24  2226   BLOOD CULTURE  No Growth After 5 Days. No Growth After 5 Days.       Imaging Review: Reviewed radiology reports from this admission including: chest xray.  Other Studies: No additional pertinent studies reviewed.    Last 24 Hours Medication List:     Current Facility-Administered Medications:     acetaminophen (TYLENOL) tablet 975 mg, Q6H PRN    ARIPiprazole (ABILIFY) tablet 2 mg, HS    artificial tear ophthalmic ointment, HS    ascorbic acid (VITAMIN C) tablet 125 mg, Daily    baclofen tablet 10 mg, TID    calcium carbonate (TUMS) chewable tablet  500 mg, BID PRN    carbidopa-levodopa (SINEMET)  mg per tablet 2 tablet, BID    Cholecalciferol (VITAMIN D3) tablet 1,000 Units, Daily    citalopram (CeleXA) tablet 20 mg, Daily    enoxaparin (LOVENOX) subcutaneous injection 40 mg, Daily    Ferrous Sulfate oral syrup 300 mg, Every Other Day    guaiFENesin (ROBITUSSIN) oral liquid 200 mg, 4x Daily PRN    insulin regular (HumuLIN R,NovoLIN R) injection 1-5 Units, Q6H    insulin regular (HumuLIN R,NovoLIN R) injection 4 Units, TID    Ketotifen Fumarate (ZADITOR) 0.035 % ophthalmic solution 1 drop, BID    levothyroxine tablet 25 mcg, Early Morning    LORazepam (ATIVAN) tablet 0.5 mg, HS    midodrine (PROAMATINE) tablet 5 mg, TID AC    oxybutynin (DITROPAN) tablet 5 mg, Daily    polyethylene glycol (MIRALAX) packet 17 g, Daily PRN    pravastatin (PRAVACHOL) tablet 40 mg, Daily With Dinner    traZODone (DESYREL) tablet 100 mg, HS    valproic acid (DEPAKENE) oral soln 125 mg, Q12H ROSEMARY    Administrative Statements   Today, Patient Was Seen By: Brooke Mendelson, DO  I have spent a total time of 30 minutes in caring for this patient on the day of the visit/encounter including Diagnostic results, Prognosis, Risks and benefits of tx options, Instructions for management, Patient and family education, Importance of tx compliance, Risk factor reductions, Counseling / Coordination of care, Documenting in the medical record, Reviewing / ordering tests, medicine, procedures  , Obtaining or reviewing history  , and Communicating with other healthcare professionals . Topics discussed with the patient / family include symptom assessment and management, medication review, psychosocial support, and supportive listening.    **Please Note: This note may have been constructed using a voice recognition system.**

## 2024-10-02 ENCOUNTER — TRANSITIONAL CARE MANAGEMENT (OUTPATIENT)
Dept: FAMILY MEDICINE CLINIC | Facility: CLINIC | Age: 64
End: 2024-10-02

## 2024-10-02 RX ORDER — GLIMEPIRIDE 1 MG/1
1 TABLET ORAL
COMMUNITY
Start: 2024-09-25 | End: 2024-10-04 | Stop reason: ALTCHOICE

## 2024-10-02 NOTE — UTILIZATION REVIEW
NOTIFICATION OF ADMISSION DISCHARGE   This is a Notification of Discharge from [facility]. Please be advised that this patient has been discharge from our facility. Below you will find the admission and discharge date and time including the patient’s disposition.   UTILIZATION REVIEW CONTACT:  Shyla Garcia  Utilization   Network Utilization Review Department  Phone: [phone] x carefully listen to the prompts. All voicemails are confidential.  Email: [email or web address]     ADMISSION INFORMATION  PRESENTATION DATE: 9/25/2024 10:03 PM  OBERVATION ADMISSION DATE: N/A  INPATIENT ADMISSION DATE: 9/25/24 11:54 PM   DISCHARGE DATE: 10/1/2024  4:58 PM   DISPOSITION:[facility]    Network Utilization Review Department  ATTENTION: Please call with any questions or concerns to [phone] and carefully listen to the prompts so that you are directed to the right person. All voicemails are confidential.   For Discharge needs, contact Care Management DC Support Team at [phone] opt. [phone]  Send all requests for admission clinical reviews, approved or denied determinations and any other requests to dedicated fax number below belonging to the campus where the patient is receiving treatment. List of dedicated fax numbers for the Facilities:  FACILITY NAME UR FAX NUMBER   ADMISSION DENIALS (Administrative/Medical Necessity) [phone]   DISCHARGE SUPPORT TEAM ([facility]) [phone]   PARENT CHILD HEALTH (Maternity/NICU/Pediatrics) [phone]   [facility] [phone]   [facility] [phone]   [facility] [phone]   [facility] [phone]   [facility] [phone]   [facility] [phone]   [facility] [phone]   [facility]  [phone]   [facility] [phone]   [facility] [phone]   [facility] [phone]   [facility] [phone]

## 2024-10-03 NOTE — ASSESSMENT & PLAN NOTE
A1c at goal of less than 7%    Recently discharged from [facility] and is now on tube feeds from 6 PM to 6 AM.  Administer regular insulin 4 units subcutaneously at 6 PM and midnight.  Sliding scale insulin 1-5 units every 6 hours.    Glucagon for blood glucose levels less than 60 mg/dL and if unconscious or seizing.  Glucose gel for asymptomatic blood glucose levels less than 60 mg/dL.    Continue fingersticks every 6 hours.  Will send for Dexcom G7 continuous glucose monitor so that patient may have benefit of alarms for hypoglycemia as she is nonverbal.    Follow-up in 3 months.  Labs prior to next visit.  Lab Results   Component Value Date    HGBA1C 6.2 (H) 08/13/2024       Orders:    Ambulatory referral to Podiatry    Hemoglobin A1C; Future    Comprehensive metabolic panel; Future    Ambulatory Referral to Diabetic Education    Glucagon (Gvoke HypoPen 2-Pack) 0.5 MG/0.1ML SOAJ; Inject 1 mg under the skin every 15 (fifteen) minutes as needed (for symptomatic blood glucose <60 and/or if patient is unconcious or seizing) Call 911 after use of pen and then notify Endocrinology provider    glucose 40 %; DISSOLVE IN WARM WATER and administer via peg tube as needed for asymptomatic for blood sugars less than 60 mg/dL and notify endocrine provider. Check blood glucose level in 15 minutes and repeat administration if blood glucose levels are less than 100. Repeat process until blood glucose level is above 100.    insulin regular (HumuLIN R,NovoLIN R) 100 units/mL injection; Inject 4 units subcutaneously at 6 PM and midnight while tube feeds are running    insulin regular (HumuLIN R,NovoLIN R) 100 units/mL injection; Inject 1-5 Units under the skin every 6 (six) hours if 0 - 179 = 0; 180 - 224 = 1 unit; 225 - 299 = 2 unit; 300 - 374 = 3 unit; 375 - 449 = 4 unit; 450+ = 5 unit.  Call MD for BS less than 60 or greater than 450

## 2024-10-03 NOTE — PROGRESS NOTES
Ambulatory Visit  Name: Terrie Alicea      : 1960      MRN: [id number]  Encounter Provider: LEATHA Siddiqi  Encounter Date: 10/4/2024   Encounter department: [facility] [place]    Assessment & Plan  Diabetes mellitus type 2, insulin dependent (HCC)  A1c at goal of less than 7%    Recently discharged from [facility] and is now on tube feeds from 6 PM to 6 AM.  Administer regular insulin 4 units subcutaneously at 6 PM and midnight.  Sliding scale insulin 1-5 units every 6 hours.    Glucagon for blood glucose levels less than 60 mg/dL and if unconscious or seizing.  Glucose gel for asymptomatic blood glucose levels less than 60 mg/dL.    Continue fingersticks every 6 hours.  Will send for Dexcom G7 continuous glucose monitor so that patient may have benefit of alarms for hypoglycemia as she is nonverbal.    Follow-up in 3 months.  Labs prior to next visit.  Lab Results   Component Value Date    HGBA1C 6.2 (H) 2024       Orders:    Ambulatory referral to Podiatry    Hemoglobin A1C; Future    Comprehensive metabolic panel; Future    Ambulatory Referral to Diabetic Education    Glucagon (Gvoke HypoPen 2-Pack) 0.5 MG/0.1ML SOAJ; Inject 1 mg under the skin every 15 (fifteen) minutes as needed (for symptomatic blood glucose <60 and/or if patient is unconcious or seizing) Call 911 after use of pen and then notify Endocrinology provider    glucose 40 %; DISSOLVE IN WARM WATER and administer via peg tube as needed for asymptomatic for blood sugars less than 60 mg/dL and notify endocrine provider. Check blood glucose level in 15 minutes and repeat administration if blood glucose levels are less than 100. Repeat process until blood glucose level is above 100.    insulin regular (HumuLIN R,NovoLIN R) 100 units/mL injection; Inject 4 units subcutaneously at 6 PM and midnight while tube feeds are running    insulin regular (HumuLIN R,NovoLIN R) 100  units/mL injection; Inject 1-5 Units under the skin every 6 (six) hours if 0 - 179 = 0; 180 - 224 = 1 unit; 225 - 299 = 2 unit; 300 - 374 = 3 unit; 375 - 449 = 4 unit; 450+ = 5 unit.  Call MD for BS less than 60 or greater than 450    Mixed hyperlipidemia  Continue simvastatin 20 mg daily.       Acquired hypothyroidism  Clinically and biochemically euthyroid.  Continue management as per PCP with levothyroxine 25 mcg daily.         History of Present Illness     Terrie Alicea is a 64 y.o. female who presents to the office today for hospital follow-up of type 2 diabetes, with new long term insulin use. Diabetes course has been stable.   Past medical history is significant for cerebral palsy, Parkinson's disease, pneumonitis.  She was recently hospitalized 9/25/2024 for severe sepsis due to recurrent aspiration pneumonia.  Her most recent A1c was 6.2%.  She was last seen 9/18/2024 by April Caldwell and Luiz while hospitalized and was transitioned to regular insulin every 6 hours due to newly placed PEG tube.  She is nonverbal and unable to provide history.  She is accompanied today by Lisa (charge nurse) and Anna () from [facility].    Care team denies recent episodes of hypoglycemia.  Symptoms of hypoglycemia include: Unknown, patient unable to verbalize    Current home glucose monitoring: Fingersticks every 6 hours by facility staff  0000: 126-155  0600: 140-262  1200: 128-177  1800: 126-261    Current Medication Regimen:   Humulin R 4 units every 6P, 12A, 6A, plus correctional scale    Diabetic Eye Exam: Scheduled, [facility]  Follows with Podiatry: Scheduled, Dr. Eubanks  Influenza Vaccine: Scheduled 10/2024  Has Thyroid Disorder: Hypothyroidism, on Levothyroxine 25 mcg  Hypertension, followed by PCP, taking: N/A  Hyperlipidemia, followed by PCP, taking: Simvastatin 20 mg daily, Tolerating well with no myalgias  History of Pancreatitis: No      History  obtained from : Patient's charge nurse  Review of Systems   Constitutional:  Negative for chills and fever.   HENT:  Negative for congestion and sore throat.    Eyes:  Negative for pain.   Respiratory:  Negative for cough and shortness of breath.    Cardiovascular:  Negative for chest pain and palpitations.   Gastrointestinal:  Positive for constipation. Negative for diarrhea, nausea and vomiting.   Endocrine: Negative for polydipsia and polyuria.   Musculoskeletal:  Negative for arthralgias and back pain.   Skin:  Positive for wound.   Neurological:  Positive for weakness.   Psychiatric/Behavioral:  Negative for sleep disturbance. The patient is not nervous/anxious.      Current Outpatient Medications on File Prior to Visit   Medication Sig Dispense Refill    Alcohol Swabs 70 % PADS May substitute brand based on insurance coverage. Check glucose TID. 100 each 0    ARIPiprazole (ABILIFY) 2 mg tablet 1 tablet by Per G Tube route daily at bedtime      ascorbic acid (VITAMIN C) 250 MG tablet 0.5 tablets (125 mg total) by Per G Tube route daily (Patient taking differently: 1,000 mg by Per G Tube route daily)      baclofen 10 mg tablet 1 tablet (10 mg total) by Per G Tube route 3 (three) times a day 84 tablet 0    Blood Glucose Monitoring Suppl (OneTouch Verio Reflect) w/Device KIT May substitute brand based on insurance coverage. Check glucose TID. (Patient taking differently: QID and PRN) 1 kit 0    calcium carbonate (OS-MORRIS) 600 MG tablet 1 tablet (600 mg total) by Per G Tube route 2 (two) times a day      carbidopa-levodopa (SINEMET CR)  mg TBCR per ER tablet TAKE 2 TABLETS BY MOUTH (50/200MG) TWICE A DAY (PARKINSONS DISEASE) 112 tablet 10    cholecalciferol (VITAMIN D3) 1,000 units tablet 1 tablet (1,000 Units total) by Per G Tube route daily 90 tablet 0    citalopram (CeleXA) 20 mg tablet 1 tablet (20 mg total) by Per G Tube route daily 30 tablet 1    Ferrous Sulfate 300 (60 Fe) mg/5 mL solution 5 mL (300 mg  total) by Per G Tube route every other day 38 mL 1    glucose blood (OneTouch Verio) test strip May substitute brand based on insurance coverage. Check glucose TID. (Patient taking differently: daily as needed QID and PRN) 100 each 0    guaiFENesin (DIABETIC TUSSIN EX) 100 MG/5ML oral liquid 10 mL (200 mg total) by Per G Tube route 4 (four) times a day as needed for cough or congestion      hydrocortisone 1 % cream Apply topically to affected area twice daily as needed for rash 30 g 0    Insulin Pen Needle (BD Pen Needle Yue 2nd Gen) 32G X 4 MM MISC For use with insulin pen. Pharmacy may dispense brand covered by insurance. 100 each 0    lansoprazole (PREVACID SOLUTAB) 15 mg disintegrating tablet 1 tablet (15 mg total) by Per G Tube route daily Please dissolve in water. 30 tablet 1    levothyroxine 25 mcg tablet 1 tablet (25 mcg total) by Per G Tube route daily TAKE 1 TABLET BY MOUTH 1 HOUR BEFORE STARTING TUBE FEEDS (HYPOTHYROIDISM)      LORazepam (Ativan) 0.5 mg tablet 1 tablet (0.5 mg total) by Per G Tube route daily at bedtime for 10 days Hold for sedation      methenamine hippurate (HIPREX) 1 g tablet Crush 1 tablet two times a day per peg tube      midodrine (PROAMATINE) 5 mg tablet 1 tablet (5 mg total) by Per G Tube route 3 (three) times a day For hypotension, hold for systolic blood pressure greater than 110. 90 tablet 1    neomycin-bacitracin-polymyxin b (NEOSPORIN) ointment Apply 1 application topically 2 (two) times a day as needed Apply to affected area BID PRN      olopatadine HCl (PATADAY) 0.2 % opth drops Administer 1 drop to both eyes as needed Instill 1 drop into affected eyes daily PRN for eye redness      OneTouch Delica Lancets 33G MISC May substitute brand based on insurance coverage. Check glucose TID. 100 each 0    oxybutynin (DITROPAN) 5 mg tablet 1 tablet (5 mg total) by Per G Tube route 3 (three) times a day      polyethylene glycol (GLYCOLAX) 17 GM/SCOOP powder 17 g by Per G Tube route  daily 510 g 0    Refresh Liquigel 1 % GEL 2 (two) times a day 1 drop Bid bilaterally      simvastatin (ZOCOR) 20 mg tablet 1 tablet (20 mg total) by Per G Tube route in the morning Daily at 4:00 PM.      traZODone (DESYREL) 100 mg tablet 1 tablet (100 mg total) by Per G Tube route daily at bedtime      valproic acid (DEPAKENE) 250 MG/5ML soln 2.5 mL (125 mg total) by Per G Tube route 2 (two) times a day 473 mL 1    Vitamins A & D (VITAMIN A & D) ointment Apply topically as needed for skin irritation      [DISCONTINUED] Glucagon (Gvoke HypoPen 2-Pack) 0.5 MG/0.1ML SOAJ Inject 1 mg under the skin every 15 (fifteen) minutes as needed (for blood glucose <60 if unable to administer glucose or failure to respond to glucose) 0.2 mL 0    [DISCONTINUED] glucose 40 % Take 15 g by mouth 4 (four) times a day as needed for low blood sugar Give one packet via peg tube as needed for hypoglycemia and patient is asymptomatic for blood sugars less than 60 and notify endocrine provider. 12.5 g 1    [DISCONTINUED] insulin regular (HumuLIN R,NovoLIN R) 100 units/mL injection Inject 4 Units under the skin 3 (three) times a day Please give at 6 PM, midnight, and 6 AM with tube feeds 3 mL 1    [DISCONTINUED] insulin regular (HumuLIN R,NovoLIN R) 100 units/mL injection Inject 1-5 Units under the skin every 6 (six) hours if 0 - 149 = 0; 150 - 224 = 1 unit; 225 - 299 = 2 unit; 300 - 374 = 3 unit; 375 - 449 = 4 unit; 450+ = 5 unit call MD for bs less than 60 or greater than 450, 3 mL 1    Calcium Carbonate 1500 (600 Ca) MG TABS 1,500 mg (Patient not taking: Reported on 10/4/2024)      omeprazole (PriLOSEC) 20 mg delayed release capsule Take 20 mg by mouth daily (Patient not taking: Reported on 10/4/2024)      [DISCONTINUED] FeroSul 325 (65 Fe) MG tablet Take 325 mg by mouth (Patient not taking: Reported on 10/4/2024)      [DISCONTINUED] glimepiride (AMARYL) 1 mg tablet Take 1 mg by mouth daily with breakfast (Patient not taking: Reported on  "10/4/2024)      [DISCONTINUED] metFORMIN (GLUCOPHAGE) 1000 MG tablet Take 1,000 mg by mouth 2 (two) times a day with meals (Patient not taking: Reported on 10/4/2024)       No current facility-administered medications on file prior to visit.      Social History     Tobacco Use    Smoking status: Never    Smokeless tobacco: Never   Vaping Use    Vaping status: Never Used   Substance and Sexual Activity    Alcohol use: Not Currently    Drug use: No    Sexual activity: Never         Objective     BP 97/60   Pulse 70   Ht 4' 10\" (1.473 m)   LMP 11/29/2009 (Exact Date)   BMI 20.55 kg/m²     Physical Exam  Vitals reviewed.   Constitutional:       General: She is not in acute distress.     Appearance: Normal appearance. She is well-developed.   HENT:      Head: Normocephalic and atraumatic.      Mouth/Throat:      Mouth: Mucous membranes are moist.   Eyes:      Conjunctiva/sclera: Conjunctivae normal.   Cardiovascular:      Rate and Rhythm: Normal rate.   Pulmonary:      Effort: Pulmonary effort is normal. No respiratory distress.   Abdominal:      Palpations: Abdomen is soft.      Tenderness: There is no abdominal tenderness.   Musculoskeletal:         General: No swelling.      Cervical back: Normal range of motion.   Skin:     General: Skin is warm and dry.      Capillary Refill: Capillary refill takes less than 2 seconds.   Neurological:      General: No focal deficit present.      Mental Status: She is alert.      Gait: Gait abnormal.   Psychiatric:         Mood and Affect: Mood normal.       Administrative Statements   I have spent a total time of 39 minutes in caring for this patient on the day of the visit/encounter including Diagnostic results, Prognosis, Risks and benefits of tx options, Instructions for management, Patient and family education, Importance of tx compliance, Risk factor reductions, Impressions, Counseling / Coordination of care, Documenting in the medical record, Reviewing / ordering tests, " medicine, procedures  , and Obtaining or reviewing history  .

## 2024-10-03 NOTE — PATIENT INSTRUCTIONS
Low Blood Sugar  Steps to treat low blood sugar.    1. Test blood sugar if you have symptoms of low blood sugar:   Low Blood Sugar Symptoms:  o Sweaty  o Dizzy  o Rapid heartbeat  o Shaky  o Bad mood  o Hungry    2. Treat blood sugar less than 70 with 15 grams of fast-acting carbohydrate:   Examples of 15 grams Fast-Acting Carbohydrate:  o 4 oz juice/ 4 oz regular soda  o 1 tablespoon honey  o 1 pack of fun size skittles (about 15 individual skittles)  o 12 gummy bears  o 4 starburst   o 3-4 hard candies (chew)  o 3-4 glucose tablets (chew)            3.   Wait 15 minutes and test your blood sugar again         4.   If blood sugar is less than 100, repeat steps 2-3.    5.   When your blood sugar is 100 or more, eat a snack if it will be longer than one hour until your next meal. The snack should be 15 grams of carbohydrate and a protein:   Examples of snacks:  o ½ sandwich  o 6 crackers with cheese or with peanut butter  o Piece of fruit with cheese or peanut butter

## 2024-10-04 ENCOUNTER — OFFICE VISIT (OUTPATIENT)
Dept: ENDOCRINOLOGY | Facility: CLINIC | Age: 64
End: 2024-10-04
Payer: MEDICARE

## 2024-10-04 ENCOUNTER — TELEPHONE (OUTPATIENT)
Dept: ENDOCRINOLOGY | Facility: CLINIC | Age: 64
End: 2024-10-04

## 2024-10-04 ENCOUNTER — TELEPHONE (OUTPATIENT)
Age: 64
End: 2024-10-04

## 2024-10-04 ENCOUNTER — TELEPHONE (OUTPATIENT)
Dept: PSYCHIATRY | Facility: CLINIC | Age: 64
End: 2024-10-04

## 2024-10-04 VITALS
BODY MASS INDEX: 20.55 KG/M2 | HEART RATE: 70 BPM | SYSTOLIC BLOOD PRESSURE: 97 MMHG | HEIGHT: 58 IN | DIASTOLIC BLOOD PRESSURE: 60 MMHG

## 2024-10-04 DIAGNOSIS — Z79.4 DIABETES MELLITUS TYPE 2, INSULIN DEPENDENT (HCC): Primary | ICD-10-CM

## 2024-10-04 DIAGNOSIS — E78.2 MIXED HYPERLIPIDEMIA: ICD-10-CM

## 2024-10-04 DIAGNOSIS — E11.9 DIABETES MELLITUS TYPE 2, INSULIN DEPENDENT (HCC): Primary | ICD-10-CM

## 2024-10-04 DIAGNOSIS — E03.9 ACQUIRED HYPOTHYROIDISM: ICD-10-CM

## 2024-10-04 DIAGNOSIS — E11.00 TYPE 2 DIABETES MELLITUS WITH HYPEROSMOLARITY WITHOUT COMA, WITHOUT LONG-TERM CURRENT USE OF INSULIN (HCC): ICD-10-CM

## 2024-10-04 LAB
DME PARACHUTE DELIVERY DATE REQUESTED: NORMAL
DME PARACHUTE ITEM DESCRIPTION: NORMAL
DME PARACHUTE ITEM DESCRIPTION: NORMAL
DME PARACHUTE ORDER STATUS: NORMAL
DME PARACHUTE SUPPLIER NAME: NORMAL
DME PARACHUTE SUPPLIER PHONE: NORMAL

## 2024-10-04 PROCEDURE — 99214 OFFICE O/P EST MOD 30 MIN: CPT

## 2024-10-04 RX ORDER — FERROUS SULFATE 325(65) MG
325 TABLET ORAL
COMMUNITY
Start: 2024-09-25 | End: 2024-10-04 | Stop reason: SDUPTHER

## 2024-10-04 RX ORDER — GLUCAGON INJECTION, SOLUTION 0.5 MG/.1ML
1 INJECTION, SOLUTION SUBCUTANEOUS
Qty: 0.2 ML | Refills: 0 | Status: SHIPPED | OUTPATIENT
Start: 2024-10-04 | End: 2024-10-04

## 2024-10-04 RX ORDER — GLUCAGON INJECTION, SOLUTION 0.5 MG/.1ML
1 INJECTION, SOLUTION SUBCUTANEOUS
Qty: 0.2 ML | Refills: 0 | Status: SHIPPED | OUTPATIENT
Start: 2024-10-04 | End: 2024-10-04 | Stop reason: SDUPTHER

## 2024-10-04 RX ORDER — GLUCAGON INJECTION, SOLUTION 0.5 MG/.1ML
1 INJECTION, SOLUTION SUBCUTANEOUS
Qty: 0.2 ML | Refills: 0 | Status: SHIPPED | OUTPATIENT
Start: 2024-10-04 | End: 2024-10-10 | Stop reason: SDUPTHER

## 2024-10-04 RX ORDER — NICOTINE POLACRILEX 4 MG
LOZENGE BUCCAL
Qty: 12.5 G | Refills: 1 | Status: SHIPPED | OUTPATIENT
Start: 2024-10-04

## 2024-10-04 NOTE — TELEPHONE ENCOUNTER
Patients care giver called stating provider needs to look over the order for the glucose gel, it needs to be dissolved to go into gtube, its for if she is asymptomatic

## 2024-10-04 NOTE — ASSESSMENT & PLAN NOTE
Clinically and biochemically euthyroid.  Continue management as per PCP with levothyroxine 25 mcg daily.

## 2024-10-04 NOTE — TELEPHONE ENCOUNTER
[facility] fax requesting PA for Lansoprazole.  Called pharmacy to inform them she is not our patient and they should submit accordingly.

## 2024-10-04 NOTE — TELEPHONE ENCOUNTER
Dexcom ordered through [employer] [facility]

## 2024-10-04 NOTE — TELEPHONE ENCOUNTER
Lisa said with the glucose 40% order that was done in the hospital needs to be fixed. they need gel that can be dissolved in water, they can't use packets bc patient has a g tube. She said this was mentioned at the appt.     Spoke to moreno in office.

## 2024-10-07 ENCOUNTER — TELEPHONE (OUTPATIENT)
Dept: FAMILY MEDICINE CLINIC | Facility: CLINIC | Age: 64
End: 2024-10-07

## 2024-10-08 ENCOUNTER — TELEPHONE (OUTPATIENT)
Dept: FAMILY MEDICINE CLINIC | Facility: CLINIC | Age: 64
End: 2024-10-08

## 2024-10-08 NOTE — TELEPHONE ENCOUNTER
Folder (To be completed by) -  Dr. Fernando     Name of Form - [employer] Detailed Written Order   Non-Sterile Gloves    Color folder ([place])    Form to be Faxed (Fax #), [phone]    Patient was made aware of the 7-10 business day form policy.

## 2024-10-08 NOTE — TELEPHONE ENCOUNTER
To be completed by: Dr. Fernando    Form:  [facility] [place]    Fax# [phone]    Folder: [place]

## 2024-10-10 ENCOUNTER — OFFICE VISIT (OUTPATIENT)
Dept: FAMILY MEDICINE CLINIC | Facility: CLINIC | Age: 64
End: 2024-10-10

## 2024-10-10 ENCOUNTER — TELEPHONE (OUTPATIENT)
Dept: FAMILY MEDICINE CLINIC | Facility: CLINIC | Age: 64
End: 2024-10-10

## 2024-10-10 VITALS
HEIGHT: 59 IN | TEMPERATURE: 97.6 F | DIASTOLIC BLOOD PRESSURE: 59 MMHG | BODY MASS INDEX: 17.18 KG/M2 | WEIGHT: 85.2 LBS | OXYGEN SATURATION: 96 % | HEART RATE: 71 BPM | RESPIRATION RATE: 18 BRPM | SYSTOLIC BLOOD PRESSURE: 108 MMHG

## 2024-10-10 DIAGNOSIS — E11.9 DIABETES MELLITUS TYPE 2, INSULIN DEPENDENT (HCC): ICD-10-CM

## 2024-10-10 DIAGNOSIS — Z79.4 DIABETES MELLITUS TYPE 2, INSULIN DEPENDENT (HCC): ICD-10-CM

## 2024-10-10 DIAGNOSIS — E61.1 IRON DEFICIENCY: ICD-10-CM

## 2024-10-10 DIAGNOSIS — G63 POLYNEUROPATHY ASSOCIATED WITH UNDERLYING DISEASE (HCC): ICD-10-CM

## 2024-10-10 DIAGNOSIS — H21.563 PUPILLARY ABNORMALITY, BILATERAL: ICD-10-CM

## 2024-10-10 DIAGNOSIS — G20.A1 PARKINSON'S DISEASE, UNSPECIFIED WHETHER DYSKINESIA PRESENT, UNSPECIFIED WHETHER MANIFESTATIONS FLUCTUATE (HCC): ICD-10-CM

## 2024-10-10 DIAGNOSIS — N39.0 RECURRENT UTI: ICD-10-CM

## 2024-10-10 DIAGNOSIS — R13.10 DYSPHAGIA, UNSPECIFIED TYPE: ICD-10-CM

## 2024-10-10 DIAGNOSIS — G80.3 DYSTONIC CEREBRAL PALSY (HCC): ICD-10-CM

## 2024-10-10 DIAGNOSIS — G82.50 SPASTIC QUADRIPARESIS (HCC): ICD-10-CM

## 2024-10-10 DIAGNOSIS — E78.5 HYPERLIPIDEMIA, UNSPECIFIED HYPERLIPIDEMIA TYPE: ICD-10-CM

## 2024-10-10 DIAGNOSIS — Z87.01 HISTORY OF ASPIRATION PNEUMONIA: ICD-10-CM

## 2024-10-10 DIAGNOSIS — F31.9 BIPOLAR AFFECTIVE DISORDER, REMISSION STATUS UNSPECIFIED (HCC): ICD-10-CM

## 2024-10-10 DIAGNOSIS — I95.9 HYPOTENSION, UNSPECIFIED HYPOTENSION TYPE: Primary | ICD-10-CM

## 2024-10-10 PROCEDURE — 99495 TRANSJ CARE MGMT MOD F2F 14D: CPT | Performed by: FAMILY MEDICINE

## 2024-10-10 RX ORDER — MULTIVIT WITH MINERALS/LUTEIN
1000 TABLET ORAL DAILY
Qty: 90 TABLET | Refills: 3 | Status: ON HOLD | OUTPATIENT
Start: 2024-10-10

## 2024-10-10 RX ORDER — SIMVASTATIN 20 MG
TABLET ORAL
Qty: 90 TABLET | Refills: 3 | Status: SHIPPED | OUTPATIENT
Start: 2024-10-10 | End: 2024-10-10

## 2024-10-10 RX ORDER — SIMVASTATIN 20 MG
TABLET ORAL
Status: ON HOLD
Start: 2024-10-10

## 2024-10-10 RX ORDER — GLUCAGON INJECTION, SOLUTION 0.5 MG/.1ML
1 INJECTION, SOLUTION SUBCUTANEOUS
Qty: 0.2 ML | Refills: 0 | Status: ON HOLD | OUTPATIENT
Start: 2024-10-10

## 2024-10-10 RX ORDER — MULTIVIT WITH MINERALS/LUTEIN
1000 TABLET ORAL DAILY
Qty: 90 TABLET | Refills: 3 | Status: SHIPPED | OUTPATIENT
Start: 2024-10-10 | End: 2024-10-10

## 2024-10-10 NOTE — TELEPHONE ENCOUNTER
Please let Terrie's caregivers know when they take her for blood work, I would also like to get a chest XR to follow-up pneumonia. Thanks!

## 2024-10-10 NOTE — PROGRESS NOTES
" Terrie Alicea 1960 female MRN: [id number]    [facility] Medicine Transition of Care Visit      SUBJECTIVE    CC: ELYSSA Visit     Transitional Care Management Review:  Terrie Alicea is a 64 y.o. female here for TCM follow up. Admitted 9/25/24 to 10/1/24 for severe sepsis. Hospital course as per discharge summary as below-     \"Severe sepsis (HCC) (Resolved: 10/1/2024)  RESOLVED. Patient was brought in by EMS from rehab facility for a fever, her fever was initially 100.9F, was given Tylenol at the facility which had than dropped to 100 F  - Met SIRS on admission with Temp 102 F.    - LA: 2.3, repeat 9/28 1.4  CXR: Pneumonia inferomedial right lung  Due to recent hospitalization with ICU after reported hypotension earlier this month followed by changes to patient's baseline, CT head negative   Was febrile 9/26 to Tmax 102.5 F  Patient was afebrile overnight     Plan:  9/29 last day of 5-day course of IV ceftriaxone to complete.   Discontinued Flagyl  Patient was stable over weekend, afebrile and with no signs or symptoms of infection, plan was to discharge yesterday to group home however due to patient's fever 2 nights ago ordered chest x-ray and procalcitonin to rule out pneumonia, likely aspiration pneumonia               Pro calcitonin 0.08, Chest x-ray unremarkable, WBC within normal limits  Follow blood cultures (no growth so far)  MRSA culture negative  Trend CBC, and BMP repeat in the AM  Tylenol 975 mg as needed every 6 hours for fevers   Plan for discharge today, educated group home about risk factors for reoccurring aspiration precautions   Pneumonia  -She was recently admitted for aspiration pneumonia in August treated with ceftriaxone, and discharged on Vantin  CXR this admission: inferomedial right lung pneumonia. No signs of abscess or putrid smelling breath  Repeat chest x ray unremarkable 9/30      Plan:  See plan for sepsis   Diabetes mellitus type 2, insulin dependent (HCC)        Lab Results "   Component Value Date     HGBA1C 6.2 (H) 08/13/2024             Recent Labs     09/30/24  1718 10/01/24  0027 10/01/24  0632 10/01/24  1115   POCGLU 115 155* 226* 177*      Blood Sugar Average: Last 72 hrs:  (P) [id number]  Was receiving home regular insulin 4 units every 6 hours. Tube feeds run 12 hours (6pm-6am). Noon doses previously held due to hypoglycemia. Continue 4 units regular insulin at 6 PM, midnight, and 6 AM during tube feeds. Continue q6 SSI. Monitor blood sugars.   Nutrition consulted, appreciate recommendations in regards to tube feeds  - Increased free water flushes to 170 mL to increase daily water intake during tube feeds  - Per nutrition recommendation, add 180 mL free water bolus twice daily during day, per patient's caregiver will increase medication bolus to 60 mL before and after medication pass through tube due to patient's timing at day programs  - Recommend adequate oral care that includes brushing mouth 2-3 times daily and moisten mouth throughout day as poor oral care can increase risk for aspiration   Parkinson's disease (HCC)  Continue home Sinemet  Urinary incontinence  Continue home oxybutynin  Hypothyroidism  Continue home levothyroxine   Mood disturbance  Continue home abilify, citalopram, and trazodone   Platelets decreased (HCC) (Resolved: 9/29/2024)        Lab Results   Component Value Date      10/01/2024      09/30/2024      09/29/2024      Resolved. No active bleeding, 9/29 platelets within normal limits at 163  Continue to monitor  Anemia        Lab Results   Component Value Date     HGB 9.3 (L) 10/01/2024     HGB 9.3 (L) 09/30/2024     HGB 9.3 (L) 09/29/2024      No active bleeding  Continue to monitor  Folate and vitamin B12 within normal limits       Medical Problems         Resolved Problems  Date Reviewed: 10/1/2024              Resolved     Hypotension 9/29/2024       Resolved by  Irvin Terrell MD     Platelets decreased  "([facility]) 9/29/2024       Resolved by  Irvin Terrell MD     * (Principal) Severe sepsis ([facility]) 10/1/2024       Resolved by  Brooke Mendelson, DO     Fever 10/1/2024       Resolved by  Brooke Mendelson, DO      \"    \"Significant Findings / Test Results:   Xray chest 9/25/24: \"Pneumonia inferomedial right lung. Follow-up after treatment to ensure resolution is recommended.\"  Macrocytic anemia, hemoglobin 9.3, hematocrit 29.4, , folate and vitamin b12 within normal limits.   Xray chest 9/30/24: unremarkable compared to previous with \"Small right lung opacity may be secondary to rotated positioning. Recommend follow-up PA and lateral chest radiograph when the patient is able.\"\"    \"Terrie Alicea is a 64 y.o. female patient who PMH of cerebral palsy, T2DM, parkinson's disease, and mood disorders who originally presented to the hospital on 9/25/2024 due to fever. She presented from rehab facility where caregiver had noticed she had a fever around 100.8 F, she was given Tylenol which reduced her fever to 100 F.  The wound care physician at the facility recommended she come to the hospital for further management. According to facility staff she was not at her baseline. She was previously admitted for severe sepsis this past August and discharged on 9/19 , and was treated for aspiration pneumonia.  Per chart review she has had multiple infections in the past.  At the beginning of patient's hospitalization her caregiver was concerned that patient has not been back to baseline since ICU admission earlier this month.  It was noted at that time that the patient's pupils are dilated more than usual and the patient was not answering yes or no questions like she used to prior to this.  Throughout her hospitalization her pupils have been equal and reactive to light.  CT head was obtained at this time to rule out any organic cause for changes to baseline, CT was negative at this time.  Patient was septic on " "presentation, this was likely due to pneumonia possibly of aspiration pneumonia as patient has been on tube feeding.  Consideration for pneumonitis with recurrent aspiration was also considered during hospitalization.  Patient was on a 5-day course of antibiotics which included 1 day of cefepime and 4 days of ceftriaxone.  Patient also had course of Flagyl.  Results of cultures showed no growth.  It was also noted on patient's blood work that she had macrocytic anemia that is compared to her baseline shows chronic anemia.  Folate and vitamin B12 were within normal limits.  At time of discharge patient has been afebrile for past 48 hours and not showing any signs or symptoms of sepsis or infection.  During hospitalization patient's tube feeding occurred from 6 PM to 6 AM the following day, patient's at home insulin of 4 units subcutaneous has been changed to 6 PM, midnight, and 6 AM and insulin sliding scale was continued for every 6 hours.  Educated patient's caregiver of the importance of elevated head of bed and aspiration precautions. Recommend increased oral hygiene to limit risk of aspiration and discussed increasing free water bolus with medications to increase total daily water intake. At time of discharge patient was stable and not showing any signs or symptoms of infection, patient to be discharged to group home.\"    During the TCM phone call patient stated:    TCM Call       Date and time call was made  10/2/2024 11:57 AM    Hospital care reviewed  Records reviewed    Patient was hospitialized at  [facility]    Date of Admission  09/25/24    Date of discharge  10/01/24    Diagnosis  Severe Sepsis, Pneumonia, Cerebral Palsy, Type 2 DM, insulin dependent, Parkinson's disease, hypothyroidism, urinary incontinence    Disposition  Home    Were the patients medications reviewed and updated  Yes    Current Symptoms  None    Weakness severity  Moderate    Fatigue severity  Moderate          TCM Call       " Post hospital issues  Reduced activity; Poor ADL (Activities of Daily Living) performance    Should patient be enrolled in anticoag monitoring?  No    Scheduled for follow up?  Yes    Did you obtain your prescribed medications  Yes    Do you need help managing your prescriptions or medications  No    Why type of assitance do you need  Group home    Is transportation to your appointment needed  No    Specify why  Group home    I have advised the patient to call PCP with any new or worsening symptoms  Sherron Eller LPN    Support System  Home care staff    The type of support provided  Emotional; Physical    Do you have social support  Yes, as much as I need    Are you recieving any outpatient services  No    What type of services  PT/ Speech     Are you recieving home care services  No    Are you using any community resources  No    Current waiver services  No    Have you fallen in the last 12 months  No    Interperter language line needed  No    Counseling  Caregiver    Counseling topics  Activities of daily living; Importance of RX compliance; Diagnostic results; patient and family education; instructions for management            During today's visit:    Hypotension- Here today with her group home caregivers who provide the history. Vital sign record reviewed, she has been afebrile, oxygen sats 96-99%, HR 60-70 on average, BP /50-60, occasional 120 systolic, per caregivers at her baseline. Taking midodrine TID. Overdue for cardiology follow-up. Referral provided. Continue midodrine current dose for now, hold for systolic >110.     Diabetes- Currently insulin dependent, following with endocrinology. Regular insulin scheduled decreased frequency from TID to 6 PM and midnight. Since then, they have noticed glucose increase, especially before noon 200-220, fasting 120-170. Glucose log scanned. They will reach out to endocrinology regarding this.     Her caregivers express concern that her pupils always seem to be  dilated, they are reactive and equal but don't constrict as they would expect out in bright light. This was brought up in the hospital, investigated and CT was negative. Possibly medication side effect, recommended follow-up ophthalmology. They will schedule an appointment.     Taking vitamin C for UTI prophylaxis, needs refill.     Dysphagia, history of aspiration/aspiration pneumonia- Currently NPO, all feeding/hydration/medication through G tube. They would like to revisit with speech eval if she allowed anything PO, prior was on pureed foods and honey thick liquids.     Iron deficiency- Taking supplement every other day right now.     They report she is doing great otherwise, no coughing, no fevers.     They are complying with their medication changes and discharge instructions.     They have the following questions: As above    Review of Systems   All other systems reviewed and are negative.      Historical Information     The patient history was reviewed as follows:    Past Medical History:   Diagnosis Date    Acute metabolic encephalopathy 12/11/2015    Adjustment disorder     Altered mental status 12/11/2015    Anemia     Bipolar 1 disorder ([facility])     Cerebral palsy ([facility])     Chronic hypernatremia 02/06/2016    Closed fracture of left hip ([facility]) 01/19/2016    Closed left hip fracture ([facility])     no surgery    Constipation     Dehydration 02/20/2016    Developmental delay     Diabetes mellitus ([facility])     Difficulty walking     Disease of thyroid gland     Diverticulosis     Dysphagia     Worsening    Fracture of multiple ribs of right side     Herpes zoster without complication 06/02/2021    Hip fracture ([facility]) 07/26/2015    left    Hyperlipidemia     Hypernatremia 12/28/2018    Hypotension     Impulse control disorder     Incontinence     Intellectual disability due to developmental disorder, unspecified     Microalbuminuria     Neuropathy in diabetes ([facility])     Osteopathia     Osteoporosis     Peripheral  neuropathy     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HCC)     Thrombocytopenia (HCC) 07/31/2015     Past Surgical History:   Procedure Laterality Date    ABDOMINAL SURGERY      COLECTOMY MIN      re-anastomosis 7/22/16    COLON SURGERY  1/31/16    COLONOSCOPY N/A 07/21/2016    Procedure: COLONOSCOPY;  Surgeon: Rosalino Jacome MD;  Location: [facility];  Service:     COLONOSCOPY N/A 01/31/2016    Procedure: COLONOSCOPY;  Surgeon: Rosalino Jacome MD;  [facility];  Service:     COLONOSCOPY N/A 07/25/2017    Procedure: COLONOSCOPY;  Surgeon: Rosalino Jacome MD;  [facility]: [facility];  Service: Colorectal    COLOSTOMY      EXPLORATORY LAPAROTOMY W/ BOWEL RESECTION N/A 01/31/2016    Procedure: exploratory laparotomy, left sigmoidectomy, coloproctostomy, take down splenic flexure, loop colostomy;  Surgeon: Rosalino Jacome MD;  [facility];  Service:     GASTROSTOMY TUBE PLACEMENT N/A 8/26/2024    Procedure: INSERTION GASTROSTOMY TUBE OPEN;  Surgeon: Monroe Jean DO;  [facility];  Service: General    MT CLOSURE ENTEROSTOMY LG/SMALL INTESTINE N/A 07/22/2016    Procedure: SEGMENTAL COLECTOMY WITH COLOCOLOSTOMY;  Surgeon: Rosalino Jacome MD;  [facility];  Service: Colorectal    UPPER GASTROINTESTINAL ENDOSCOPY       Family History   Problem Relation Age of Onset    Alcohol abuse Mother     Alcohol abuse Father     Diabetes Sister     No Known Problems Sister     No Known Problems Sister       Social History   Social History     Substance and Sexual Activity   Alcohol Use Not Currently     Social History     Substance and Sexual Activity   Drug Use No     Social History     Tobacco Use   Smoking Status Never   Smokeless Tobacco Never       Medications:   Meds/Allergies     Current Outpatient Medications:     Alcohol Swabs 70 % PADS, May substitute brand based on insurance coverage. Check glucose TID., Disp: 100 each, Rfl: 0    ARIPiprazole (ABILIFY) 2 mg tablet, 1 tablet by Per G Tube  route daily at bedtime, Disp: , Rfl:     Ascorbic Acid (vitamin C) 1000 MG tablet, 1 tablet (1,000 mg total) by Per G Tube route daily, Disp: 90 tablet, Rfl: 3    baclofen 10 mg tablet, 1 tablet (10 mg total) by Per G Tube route 3 (three) times a day, Disp: 84 tablet, Rfl: 0    Blood Glucose Monitoring Suppl (OneTouch Verio Reflect) w/Device KIT, May substitute brand based on insurance coverage. Check glucose TID. (Patient taking differently: QID and PRN), Disp: 1 kit, Rfl: 0    calcium carbonate (OS-MORRIS) 600 MG tablet, 1 tablet (600 mg total) by Per G Tube route 2 (two) times a day, Disp: , Rfl:     carbidopa-levodopa (SINEMET CR)  mg TBCR per ER tablet, TAKE 2 TABLETS BY MOUTH (50/200MG) TWICE A DAY (PARKINSONS DISEASE) (Patient taking differently: Take 2 tablets by mouth 2 (two) times a day Crushed for Via G tube), Disp: 112 tablet, Rfl: 10    cholecalciferol (VITAMIN D3) 1,000 units tablet, 1 tablet (1,000 Units total) by Per G Tube route daily, Disp: 90 tablet, Rfl: 0    citalopram (CeleXA) 20 mg tablet, 1 tablet (20 mg total) by Per G Tube route daily, Disp: 30 tablet, Rfl: 1    Ferrous Sulfate 300 (60 Fe) mg/5 mL solution, 5 mL (300 mg total) by Per G Tube route every other day (Patient taking differently: 300 mg by Per G Tube route every other day), Disp: 38 mL, Rfl: 1    Glucagon (Gvoke HypoPen 2-Pack) 0.5 MG/0.1ML SOAJ, Inject 1 mg under the skin every 15 (fifteen) minutes as needed (for symptomatic blood glucose <60 and/or if patient is unconcious or seizing) Call 911 after use of pen and then notify Endocrinology provider, Disp: 0.2 mL, Rfl: 0    glucose 40 %, DISSOLVE IN WARM WATER and administer via peg tube as needed for asymptomatic for blood sugars less than 60 mg/dL and notify endocrine provider. Check blood glucose level in 15 minutes and repeat administration if blood glucose levels are less than 100. Repeat process until blood glucose level is above 100., Disp: 12.5 g, Rfl: 1    glucose  blood (OneTouch Verio) test strip, May substitute brand based on insurance coverage. Check glucose TID. (Patient taking differently: daily as needed QID and PRN), Disp: 100 each, Rfl: 0    guaiFENesin (DIABETIC TUSSIN EX) 100 MG/5ML oral liquid, 10 mL (200 mg total) by Per G Tube route 4 (four) times a day as needed for cough or congestion, Disp: , Rfl:     hydrocortisone 1 % cream, Apply topically to affected area twice daily as needed for rash, Disp: 30 g, Rfl: 0    Insulin Pen Needle (BD Pen Needle Yue 2nd Gen) 32G X 4 MM MISC, For use with insulin pen. Pharmacy may dispense brand covered by insurance., Disp: 100 each, Rfl: 0    insulin regular (HumuLIN R,NovoLIN R) 100 units/mL injection, Inject 4 units subcutaneously at 6 PM and midnight while tube feeds are running, Disp: 3 mL, Rfl: 1    insulin regular (HumuLIN R,NovoLIN R) 100 units/mL injection, Inject 1-5 Units under the skin every 6 (six) hours if 0 - 179 = 0; 180 - 224 = 1 unit; 225 - 299 = 2 unit; 300 - 374 = 3 unit; 375 - 449 = 4 unit; 450+ = 5 unit.  Call MD for BS less than 60 or greater than 450, Disp: 15 mL, Rfl: 1    lansoprazole (PREVACID SOLUTAB) 15 mg disintegrating tablet, 1 tablet (15 mg total) by Per G Tube route daily Please dissolve in water., Disp: 30 tablet, Rfl: 1    levothyroxine 25 mcg tablet, 1 tablet (25 mcg total) by Per G Tube route daily TAKE 1 TABLET BY MOUTH 1 HOUR BEFORE STARTING TUBE FEEDS (HYPOTHYROIDISM), Disp: , Rfl:     LORazepam (Ativan) 0.5 mg tablet, 1 tablet (0.5 mg total) by Per G Tube route daily at bedtime for 10 days Hold for sedation, Disp: , Rfl:     methenamine hippurate (HIPREX) 1 g tablet, Crush 1 tablet two times a day per peg tube, Disp: , Rfl:     midodrine (PROAMATINE) 5 mg tablet, 1 tablet (5 mg total) by Per G Tube route 3 (three) times a day For hypotension, hold for systolic blood pressure greater than 110., Disp: 90 tablet, Rfl: 1    neomycin-bacitracin-polymyxin b (NEOSPORIN) ointment, Apply 1  application topically 2 (two) times a day as needed Apply to affected area BID PRN, Disp: , Rfl:     olopatadine HCl (PATADAY) 0.2 % opth drops, Administer 1 drop to both eyes as needed Instill 1 drop into affected eyes daily PRN for eye redness, Disp: , Rfl:     OneTouch Delica Lancets 33G MISC, May substitute brand based on insurance coverage. Check glucose TID., Disp: 100 each, Rfl: 0    oxybutynin (DITROPAN) 5 mg tablet, 1 tablet (5 mg total) by Per G Tube route 3 (three) times a day, Disp: , Rfl:     polyethylene glycol (GLYCOLAX) 17 GM/SCOOP powder, 17 g by Per G Tube route daily (Patient taking differently: 17 g by Per G Tube route daily Hold one day for loose stools), Disp: 510 g, Rfl: 0    Refresh Liquigel 1 % GEL, 2 (two) times a day 1 drop Bid bilaterally, Disp: , Rfl:     simvastatin (ZOCOR) 20 mg tablet, Daily at 4:00 PM., Disp: , Rfl:     traZODone (DESYREL) 100 mg tablet, 1 tablet (100 mg total) by Per G Tube route daily at bedtime (Patient taking differently: 100 mg by Per G Tube route daily at bedtime), Disp: , Rfl:     valproic acid (DEPAKENE) 250 MG/5ML soln, 2.5 mL (125 mg total) by Per G Tube route 2 (two) times a day (Patient taking differently: 125 mg by Per G Tube route 2 (two) times a day), Disp: 473 mL, Rfl: 1    Vitamins A & D (VITAMIN A & D) ointment, Apply topically as needed for skin irritation, Disp: , Rfl:     calcium carbonate (OS-MORRIS) 600 MG tablet, 1,500 mg (Patient not taking: Reported on 10/10/2024), Disp: , Rfl:     omeprazole (PriLOSEC) 20 mg delayed release capsule, Take 20 mg by mouth daily (Patient not taking: Reported on 10/4/2024), Disp: , Rfl:   Allergies   Allergen Reactions    Ingrezza [Valbenazine Tosylate] Other (See Comments)     Behavioral changes per caregiver    Keflex [Cephalexin] GI Intolerance       OBJECTIVE    Vitals:   Vitals:    10/10/24 1031   BP: 108/59   BP Location: Left arm   Patient Position: Sitting   Cuff Size: Standard   Pulse: 71   Resp: 18  "  Temp: 97.6 °F (36.4 °C)   TempSrc: Temporal   SpO2: 96%   Weight: 38.6 kg (85 lb 3.2 oz)   Height: 4' 11\" (1.499 m)       Body mass index is 17.21 kg/m².    Physical Exam:    Physical Exam  Vitals reviewed.   Constitutional:       General: She is not in acute distress.     Appearance: She is not ill-appearing.   Cardiovascular:      Rate and Rhythm: Normal rate and regular rhythm.      Comments: Possibly soft systolic murmur, distant heart sounds, will reassess next visit/referral cardiology   Pulmonary:      Effort: Pulmonary effort is normal. No respiratory distress.      Breath sounds: Normal breath sounds. No stridor. No wheezing or rhonchi.   Skin:     General: Skin is warm.      Findings: No erythema or rash.   Neurological:      Mental Status: She is alert and oriented to person, place, and time.      Motor: No weakness.   Psychiatric:         Mood and Affect: Mood normal.         Behavior: Behavior normal.          Labs:  I have personally reviewed all pertinent results.     Telephone on 10/04/2024   Component Date Value Ref Range Status    Supplier Name 10/04/2024 [employer]   In process    Supplier Phone Number 10/04/2024 [phone]   In process    Order Status 10/04/2024 Pending Review   In process    Delivery Request Date 10/04/2024 10/04/2024   In process    Item Description 10/04/2024 Dexcom G7    In process    Comment: Qty: 1  Directions for CGM use: Use per  directions      Item Description 10/04/2024 Dexcom G7 Sensor (All-in-One), Change Every 10 Days, Pack (1)   In process    Comment: Qty: 9  Directions for CGM use: Use per  directions         Imaging:  I have personally reviewed all pertinent results.    Assessment & Plan    No problem-specific Assessment & Plan notes found for this encounter.    Terrie was seen today for transition of care management.    Diagnoses and all orders for this visit:    Hypotension, unspecified hypotension type  -     " Ambulatory Referral to Cardiology; Future    Diabetes mellitus type 2, insulin dependent (HCC)  -     Glucagon (Gvoke HypoPen 2-Pack) 0.5 MG/0.1ML SOAJ; Inject 1 mg under the skin every 15 (fifteen) minutes as needed (for symptomatic blood glucose <60 and/or if patient is unconcious or seizing) Call 911 after use of pen and then notify Endocrinology provider  -     insulin regular (HumuLIN R,NovoLIN R) 100 units/mL injection; Inject 1-5 Units under the skin every 6 (six) hours if 0 - 179 = 0; 180 - 224 = 1 unit; 225 - 299 = 2 unit; 300 - 374 = 3 unit; 375 - 449 = 4 unit; 450+ = 5 unit.  Call MD for BS less than 60 or greater than 450  -     CBC and differential; Future  -     Comprehensive metabolic panel; Future  -     Iron Panel (Includes Ferritin, Iron Sat%, Iron, and TIBC); Future    Pupillary abnormality, bilateral    Hyperlipidemia, unspecified hyperlipidemia type  -     Discontinue: simvastatin (ZOCOR) 20 mg tablet; Daily at 4:00 PM.  -     simvastatin (ZOCOR) 20 mg tablet; Daily at 4:00 PM.    Recurrent UTI  -     Discontinue: Ascorbic Acid (vitamin C) 1000 MG tablet; Take 1 tablet (1,000 mg total) by mouth daily  -     Ascorbic Acid (vitamin C) 1000 MG tablet; 1 tablet (1,000 mg total) by Per G Tube route daily    History of aspiration pneumonia  -     Ambulatory Referral to Speech Therapy; Future  -     XR chest pa and lateral; Future    Dysphagia, unspecified type  -     Ambulatory Referral to Speech Therapy; Future  -     XR chest pa and lateral; Future    Iron deficiency  -     CBC and differential; Future  -     Comprehensive metabolic panel; Future  -     Iron Panel (Includes Ferritin, Iron Sat%, Iron, and TIBC); Future    Parkinson's disease, unspecified whether dyskinesia present, unspecified whether manifestations fluctuate (HCC)    Dystonic cerebral palsy (HCC)    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HCC)    Polyneuropathy associated with underlying disease (HCC)    Bipolar affective disorder, remission status  unspecified (HCC)          Future Appointments   Date Time Provider Department [place]   10/21/2024 10:40 AM LEATHA Wright [facility]   10/23/2024  2:00 PM Manolo Eubanks DPM Pod [facility]   10/24/2024  9:50 AM Lila Dodd DO [facility]   12/6/2024 10:00 AM Drea Camarena MD CARD [facility]   12/23/2024  2:00 PM BE [facility]MO [facility] 2 [facility] [facility] [place]   12/30/2024  9:00 AM [facility]   1/14/2025  8:30 AM BE DEXA SHA SLN 1 BE SLN Dexa BE [place]   1/21/2025  1:00 PM Ashley Depadua, [facility]   1/24/2025  8:20 AM Shantelle Sargent MD DIAB [facility]   2/12/2025 10:40 AM Cathie Barroso DO GASTRO  [facility]          Lila Dodd DO  [facility] Primary [facility]

## 2024-10-15 ENCOUNTER — TELEPHONE (OUTPATIENT)
Dept: FAMILY MEDICINE CLINIC | Facility: CLINIC | Age: 64
End: 2024-10-15

## 2024-10-15 NOTE — TELEPHONE ENCOUNTER
Folder (To be completed by) -Dr. Fernanod     Name of Form - [facility] patient care        Form to be Faxed [phone]    Patient was made aware of the 7-10 business day form policy.

## 2024-10-17 LAB

## 2024-10-18 ENCOUNTER — HOSPITAL ENCOUNTER (OUTPATIENT)
Dept: RADIOLOGY | Facility: HOSPITAL | Age: 64
Discharge: HOME | End: 2024-10-18
Payer: MEDICARE

## 2024-10-18 ENCOUNTER — HOSPITAL ENCOUNTER (INPATIENT)
Facility: HOSPITAL | Age: 64
LOS: 17 days | Discharge: HOME | DRG: 640 | End: 2024-11-04
Attending: EMERGENCY MEDICINE | Admitting: STUDENT IN AN ORGANIZED HEALTH CARE EDUCATION/TRAINING PROGRAM
Payer: MEDICARE

## 2024-10-18 ENCOUNTER — APPOINTMENT (OUTPATIENT)
Dept: LAB | Facility: CLINIC | Age: 64
DRG: 640 | End: 2024-10-18
Payer: MEDICARE

## 2024-10-18 ENCOUNTER — TELEPHONE (OUTPATIENT)
Dept: FAMILY MEDICINE CLINIC | Facility: CLINIC | Age: 64
End: 2024-10-18

## 2024-10-18 DIAGNOSIS — R41.82 ALTERED MENTAL STATUS, UNSPECIFIED ALTERED MENTAL STATUS TYPE: ICD-10-CM

## 2024-10-18 DIAGNOSIS — E83.52 HYPERCALCEMIA: Primary | ICD-10-CM

## 2024-10-18 DIAGNOSIS — E61.1 IRON DEFICIENCY: ICD-10-CM

## 2024-10-18 DIAGNOSIS — Z87.01 HISTORY OF ASPIRATION PNEUMONIA: ICD-10-CM

## 2024-10-18 DIAGNOSIS — T14.8XXA HEMATOMA: ICD-10-CM

## 2024-10-18 DIAGNOSIS — M81.0 OSTEOPOROSIS, UNSPECIFIED OSTEOPOROSIS TYPE, UNSPECIFIED PATHOLOGICAL FRACTURE PRESENCE: ICD-10-CM

## 2024-10-18 DIAGNOSIS — N17.9 AKI (ACUTE KIDNEY INJURY) (HCC): ICD-10-CM

## 2024-10-18 DIAGNOSIS — E11.9 DIABETES MELLITUS TYPE 2, INSULIN DEPENDENT (HCC): ICD-10-CM

## 2024-10-18 DIAGNOSIS — Z79.4 DIABETES MELLITUS TYPE 2, INSULIN DEPENDENT (HCC): ICD-10-CM

## 2024-10-18 DIAGNOSIS — G80.0 SPASTIC QUADRIPLEGIC CEREBRAL PALSY (HCC): ICD-10-CM

## 2024-10-18 DIAGNOSIS — R13.10 DYSPHAGIA, UNSPECIFIED TYPE: ICD-10-CM

## 2024-10-18 DIAGNOSIS — R50.9 FEVER: ICD-10-CM

## 2024-10-18 LAB
ALBUMIN SERPL BCG-MCNC: 4.3 G/DL (ref 3.5–5)
ALBUMIN SERPL BCG-MCNC: 4.4 G/DL (ref 3.5–5)
ALP SERPL-CCNC: 54 U/L (ref 34–104)
ALP SERPL-CCNC: 54 U/L (ref 34–104)
ALT SERPL W P-5'-P-CCNC: 3 U/L (ref 7–52)
ALT SERPL W P-5'-P-CCNC: 5 U/L (ref 7–52)
ANION GAP SERPL CALCULATED.3IONS-SCNC: 7 MMOL/L (ref 4–13)
ANION GAP SERPL CALCULATED.3IONS-SCNC: 9 MMOL/L (ref 4–13)
AST SERPL W P-5'-P-CCNC: 4 U/L (ref 13–39)
AST SERPL W P-5'-P-CCNC: 5 U/L (ref 13–39)
BASOPHILS # BLD AUTO: 0.03 THOUSANDS/ΜL (ref 0–0.1)
BASOPHILS # BLD AUTO: 0.06 THOUSANDS/ΜL (ref 0–0.1)
BASOPHILS NFR BLD AUTO: 0 % (ref 0–1)
BASOPHILS NFR BLD AUTO: 1 % (ref 0–1)
BILIRUB SERPL-MCNC: 0.3 MG/DL (ref 0.2–1)
BILIRUB SERPL-MCNC: 0.33 MG/DL (ref 0.2–1)
BUN SERPL-MCNC: 70 MG/DL (ref 5–25)
BUN SERPL-MCNC: 72 MG/DL (ref 5–25)
CA-I BLD-SCNC: 1.69 MMOL/L (ref 1.12–1.32)
CALCIUM SERPL-MCNC: 14.2 MG/DL (ref 8.4–10.2)
CALCIUM SERPL-MCNC: 14.8 MG/DL (ref 8.4–10.2)
CHLORIDE SERPL-SCNC: 98 MMOL/L (ref 96–108)
CHLORIDE SERPL-SCNC: 99 MMOL/L (ref 96–108)
CO2 SERPL-SCNC: 36 MMOL/L (ref 21–32)
CO2 SERPL-SCNC: 36 MMOL/L (ref 21–32)
CREAT SERPL-MCNC: 2.83 MG/DL (ref 0.6–1.3)
CREAT SERPL-MCNC: 2.86 MG/DL (ref 0.6–1.3)
EOSINOPHIL # BLD AUTO: 0.09 THOUSAND/ΜL (ref 0–0.61)
EOSINOPHIL # BLD AUTO: 0.12 THOUSAND/ΜL (ref 0–0.61)
EOSINOPHIL NFR BLD AUTO: 1 % (ref 0–6)
EOSINOPHIL NFR BLD AUTO: 2 % (ref 0–6)
ERYTHROCYTE [DISTWIDTH] IN BLOOD BY AUTOMATED COUNT: 13 % (ref 11.6–15.1)
ERYTHROCYTE [DISTWIDTH] IN BLOOD BY AUTOMATED COUNT: 13 % (ref 11.6–15.1)
GFR SERPL CREATININE-BSD FRML MDRD: 16 ML/MIN/1.73SQ M
GFR SERPL CREATININE-BSD FRML MDRD: 17 ML/MIN/1.73SQ M
GLUCOSE P FAST SERPL-MCNC: 217 MG/DL (ref 65–99)
GLUCOSE SERPL-MCNC: 95 MG/DL (ref 65–140)
HCT VFR BLD AUTO: 32.4 % (ref 34.8–46.1)
HCT VFR BLD AUTO: 34.4 % (ref 34.8–46.1)
HGB BLD-MCNC: 10.4 G/DL (ref 11.5–15.4)
HGB BLD-MCNC: 11 G/DL (ref 11.5–15.4)
IMM GRANULOCYTES # BLD AUTO: 0.01 THOUSAND/UL (ref 0–0.2)
IMM GRANULOCYTES # BLD AUTO: 0.03 THOUSAND/UL (ref 0–0.2)
IMM GRANULOCYTES NFR BLD AUTO: 0 % (ref 0–2)
IMM GRANULOCYTES NFR BLD AUTO: 0 % (ref 0–2)
IRON SATN MFR SERPL: 19 % (ref 15–50)
IRON SERPL-MCNC: 66 UG/DL (ref 50–212)
LYMPHOCYTES # BLD AUTO: 1.46 THOUSANDS/ΜL (ref 0.6–4.47)
LYMPHOCYTES # BLD AUTO: 1.6 THOUSANDS/ΜL (ref 0.6–4.47)
LYMPHOCYTES NFR BLD AUTO: 21 % (ref 14–44)
LYMPHOCYTES NFR BLD AUTO: 23 % (ref 14–44)
MCH RBC QN AUTO: 33 PG (ref 26.8–34.3)
MCH RBC QN AUTO: 33.3 PG (ref 26.8–34.3)
MCHC RBC AUTO-ENTMCNC: 32 G/DL (ref 31.4–37.4)
MCHC RBC AUTO-ENTMCNC: 32.1 G/DL (ref 31.4–37.4)
MCV RBC AUTO: 103 FL (ref 82–98)
MCV RBC AUTO: 104 FL (ref 82–98)
MONOCYTES # BLD AUTO: 0.53 THOUSAND/ΜL (ref 0.17–1.22)
MONOCYTES # BLD AUTO: 0.67 THOUSAND/ΜL (ref 0.17–1.22)
MONOCYTES NFR BLD AUTO: 10 % (ref 4–12)
MONOCYTES NFR BLD AUTO: 8 % (ref 4–12)
NEUTROPHILS # BLD AUTO: 4.56 THOUSANDS/ΜL (ref 1.85–7.62)
NEUTROPHILS # BLD AUTO: 4.56 THOUSANDS/ΜL (ref 1.85–7.62)
NEUTS SEG NFR BLD AUTO: 66 % (ref 43–75)
NEUTS SEG NFR BLD AUTO: 68 % (ref 43–75)
NRBC BLD AUTO-RTO: 0 /100 WBCS
NRBC BLD AUTO-RTO: 0 /100 WBCS
PLATELET # BLD AUTO: 156 THOUSANDS/UL (ref 149–390)
PLATELET # BLD AUTO: 173 THOUSANDS/UL (ref 149–390)
PMV BLD AUTO: 13 FL (ref 8.9–12.7)
PMV BLD AUTO: 13.3 FL (ref 8.9–12.7)
POTASSIUM SERPL-SCNC: 4.5 MMOL/L (ref 3.5–5.3)
POTASSIUM SERPL-SCNC: 4.6 MMOL/L (ref 3.5–5.3)
PROT SERPL-MCNC: 8 G/DL (ref 6.4–8.4)
PROT SERPL-MCNC: 8.1 G/DL (ref 6.4–8.4)
RBC # BLD AUTO: 3.15 MILLION/UL (ref 3.81–5.12)
RBC # BLD AUTO: 3.3 MILLION/UL (ref 3.81–5.12)
SODIUM SERPL-SCNC: 141 MMOL/L (ref 135–147)
SODIUM SERPL-SCNC: 144 MMOL/L (ref 135–147)
TIBC SERPL-MCNC: 340 UG/DL (ref 250–450)
TSH SERPL DL<=0.05 MIU/L-ACNC: 0.85 UIU/ML (ref 0.45–4.5)
UIBC SERPL-MCNC: 274 UG/DL (ref 155–355)
WBC # BLD AUTO: 6.82 THOUSAND/UL (ref 4.31–10.16)
WBC # BLD AUTO: 6.9 THOUSAND/UL (ref 4.31–10.16)

## 2024-10-18 PROCEDURE — 93005 ELECTROCARDIOGRAM TRACING: CPT

## 2024-10-18 PROCEDURE — 84439 ASSAY OF FREE THYROXINE: CPT

## 2024-10-18 PROCEDURE — 83550 IRON BINDING TEST: CPT

## 2024-10-18 PROCEDURE — 71046 X-RAY EXAM CHEST 2 VIEWS: CPT

## 2024-10-18 PROCEDURE — 85025 COMPLETE CBC W/AUTO DIFF WBC: CPT | Performed by: EMERGENCY MEDICINE

## 2024-10-18 PROCEDURE — 99285 EMERGENCY DEPT VISIT HI MDM: CPT | Performed by: EMERGENCY MEDICINE

## 2024-10-18 PROCEDURE — 85025 COMPLETE CBC W/AUTO DIFF WBC: CPT

## 2024-10-18 PROCEDURE — 96360 HYDRATION IV INFUSION INIT: CPT

## 2024-10-18 PROCEDURE — 82728 ASSAY OF FERRITIN: CPT

## 2024-10-18 PROCEDURE — 83970 ASSAY OF PARATHORMONE: CPT | Performed by: EMERGENCY MEDICINE

## 2024-10-18 PROCEDURE — 82306 VITAMIN D 25 HYDROXY: CPT | Performed by: EMERGENCY MEDICINE

## 2024-10-18 PROCEDURE — 36415 COLL VENOUS BLD VENIPUNCTURE: CPT

## 2024-10-18 PROCEDURE — 82330 ASSAY OF CALCIUM: CPT | Performed by: EMERGENCY MEDICINE

## 2024-10-18 PROCEDURE — 96361 HYDRATE IV INFUSION ADD-ON: CPT

## 2024-10-18 PROCEDURE — 80053 COMPREHEN METABOLIC PANEL: CPT

## 2024-10-18 PROCEDURE — 84443 ASSAY THYROID STIM HORMONE: CPT | Performed by: EMERGENCY MEDICINE

## 2024-10-18 PROCEDURE — 80053 COMPREHEN METABOLIC PANEL: CPT | Performed by: EMERGENCY MEDICINE

## 2024-10-18 PROCEDURE — 83540 ASSAY OF IRON: CPT

## 2024-10-18 PROCEDURE — 99284 EMERGENCY DEPT VISIT MOD MDM: CPT

## 2024-10-18 RX ORDER — OXYBUTYNIN CHLORIDE 5 MG/1
5 TABLET ORAL 3 TIMES DAILY
Status: DISCONTINUED | OUTPATIENT
Start: 2024-10-18 | End: 2024-11-04 | Stop reason: HOSPADM

## 2024-10-18 RX ORDER — FERROUS SULFATE 300 MG/5ML
300 LIQUID (ML) ORAL EVERY OTHER DAY
Status: DISCONTINUED | OUTPATIENT
Start: 2024-10-19 | End: 2024-11-04 | Stop reason: HOSPADM

## 2024-10-18 RX ORDER — TRAZODONE HYDROCHLORIDE 100 MG/1
100 TABLET ORAL
Status: DISCONTINUED | OUTPATIENT
Start: 2024-10-18 | End: 2024-10-22

## 2024-10-18 RX ORDER — PANTOPRAZOLE SODIUM 20 MG/1
20 TABLET, DELAYED RELEASE ORAL
Status: DISCONTINUED | OUTPATIENT
Start: 2024-10-19 | End: 2024-10-18

## 2024-10-18 RX ORDER — HEPARIN SODIUM 5000 [USP'U]/ML
5000 INJECTION, SOLUTION INTRAVENOUS; SUBCUTANEOUS EVERY 8 HOURS SCHEDULED
Status: DISCONTINUED | OUTPATIENT
Start: 2024-10-19 | End: 2024-10-22

## 2024-10-18 RX ORDER — PRAVASTATIN SODIUM 40 MG
40 TABLET ORAL
Status: DISCONTINUED | OUTPATIENT
Start: 2024-10-19 | End: 2024-11-04 | Stop reason: HOSPADM

## 2024-10-18 RX ORDER — VALPROIC ACID 250 MG/5ML
125 SOLUTION ORAL 2 TIMES DAILY
Status: DISCONTINUED | OUTPATIENT
Start: 2024-10-18 | End: 2024-11-04 | Stop reason: HOSPADM

## 2024-10-18 RX ORDER — KETOTIFEN FUMARATE 0.35 MG/ML
1 SOLUTION/ DROPS OPHTHALMIC 2 TIMES DAILY PRN
Status: DISCONTINUED | OUTPATIENT
Start: 2024-10-18 | End: 2024-11-04 | Stop reason: HOSPADM

## 2024-10-18 RX ORDER — POLYETHYLENE GLYCOL 3350 17 G/17G
17 POWDER, FOR SOLUTION ORAL DAILY PRN
Status: DISCONTINUED | OUTPATIENT
Start: 2024-10-18 | End: 2024-10-25

## 2024-10-18 RX ORDER — ARIPIPRAZOLE 2 MG/1
2 TABLET ORAL
Status: DISCONTINUED | OUTPATIENT
Start: 2024-10-18 | End: 2024-11-04 | Stop reason: HOSPADM

## 2024-10-18 RX ORDER — CITALOPRAM HYDROBROMIDE 20 MG/1
20 TABLET ORAL DAILY
Status: DISCONTINUED | OUTPATIENT
Start: 2024-10-19 | End: 2024-11-04 | Stop reason: HOSPADM

## 2024-10-18 RX ORDER — CALCITONIN SALMON 200 [USP'U]/ML
100 INJECTION, SOLUTION INTRAMUSCULAR; SUBCUTANEOUS ONCE
Status: DISCONTINUED | OUTPATIENT
Start: 2024-10-18 | End: 2024-10-18

## 2024-10-18 RX ORDER — PANTOPRAZOLE SODIUM 40 MG/10ML
20 INJECTION, POWDER, LYOPHILIZED, FOR SOLUTION INTRAVENOUS
Status: DISCONTINUED | OUTPATIENT
Start: 2024-10-19 | End: 2024-10-29

## 2024-10-18 RX ORDER — ASCORBIC ACID 500 MG
1000 TABLET ORAL DAILY
Status: DISCONTINUED | OUTPATIENT
Start: 2024-10-19 | End: 2024-11-04 | Stop reason: HOSPADM

## 2024-10-18 RX ORDER — SODIUM CHLORIDE 9 MG/ML
100 INJECTION, SOLUTION INTRAVENOUS CONTINUOUS
Status: DISCONTINUED | OUTPATIENT
Start: 2024-10-18 | End: 2024-10-19

## 2024-10-18 RX ORDER — LEVOTHYROXINE SODIUM 25 UG/1
25 TABLET ORAL
Status: DISCONTINUED | OUTPATIENT
Start: 2024-10-19 | End: 2024-11-04 | Stop reason: HOSPADM

## 2024-10-18 RX ORDER — METHENAMINE HIPPURATE 1000 MG/1
1 TABLET ORAL 2 TIMES DAILY
Status: DISCONTINUED | OUTPATIENT
Start: 2024-10-19 | End: 2024-10-19

## 2024-10-18 RX ORDER — CARBIDOPA AND LEVODOPA 25; 100 MG/1; MG/1
2 TABLET, EXTENDED RELEASE ORAL 2 TIMES DAILY
Status: DISCONTINUED | OUTPATIENT
Start: 2024-10-19 | End: 2024-10-28

## 2024-10-18 RX ORDER — MIDODRINE HYDROCHLORIDE 5 MG/1
5 TABLET ORAL 3 TIMES DAILY PRN
Status: DISCONTINUED | OUTPATIENT
Start: 2024-10-18 | End: 2024-11-02

## 2024-10-18 RX ADMIN — SODIUM CHLORIDE 1000 ML: 0.9 INJECTION, SOLUTION INTRAVENOUS at 20:02

## 2024-10-18 RX ADMIN — CALCITONIN SALMON 1 UNITS: 200 INJECTION, SOLUTION INTRAMUSCULAR; SUBCUTANEOUS at 23:52

## 2024-10-18 NOTE — TELEPHONE ENCOUNTER
Paged about critical result-     Critical result on Terrie Alicea, 1960, MRN [id number] Calcium: 14.2 mg/dL High Critical (Ref. Range: 8.4-10.2)     Spoke to care giver Lisa, the past 2 days she has not been herself, change from baseline. Recommended ED eval for hypercalcemia and AMS. She is agreeable and will take her now.

## 2024-10-18 NOTE — TELEPHONE ENCOUNTER
Labs reviewed, not reported to me initially but noted after result was released- severe SCAR with creatinine 2.83, GFR 17. ED disposition already discussed.

## 2024-10-19 ENCOUNTER — APPOINTMENT (INPATIENT)
Dept: ULTRASOUND IMAGING | Facility: HOSPITAL | Age: 64
DRG: 640 | End: 2024-10-19
Payer: MEDICARE

## 2024-10-19 PROBLEM — E87.3 METABOLIC ALKALOSIS: Status: ACTIVE | Noted: 2024-10-19

## 2024-10-19 LAB
25(OH)D3 SERPL-MCNC: 91.8 NG/ML (ref 30–100)
ALBUMIN SERPL BCG-MCNC: 3.7 G/DL (ref 3.5–5)
ALP SERPL-CCNC: 47 U/L (ref 34–104)
ALT SERPL W P-5'-P-CCNC: <3 U/L (ref 7–52)
ANION GAP SERPL CALCULATED.3IONS-SCNC: 8 MMOL/L (ref 4–13)
AST SERPL W P-5'-P-CCNC: 3 U/L (ref 13–39)
BILIRUB SERPL-MCNC: 0.36 MG/DL (ref 0.2–1)
BUN SERPL-MCNC: 61 MG/DL (ref 5–25)
CA-I BLD-SCNC: 1.51 MMOL/L (ref 1.12–1.32)
CALCIUM SERPL-MCNC: 12.4 MG/DL (ref 8.4–10.2)
CALCIUM SERPL-MCNC: 13.5 MG/DL (ref 8.4–10.2)
CHLORIDE SERPL-SCNC: 109 MMOL/L (ref 96–108)
CO2 SERPL-SCNC: 28 MMOL/L (ref 21–32)
CREAT SERPL-MCNC: 2.62 MG/DL (ref 0.6–1.3)
ERYTHROCYTE [DISTWIDTH] IN BLOOD BY AUTOMATED COUNT: 13 % (ref 11.6–15.1)
FERRITIN SERPL-MCNC: 116 NG/ML (ref 11–307)
GFR SERPL CREATININE-BSD FRML MDRD: 18 ML/MIN/1.73SQ M
GLUCOSE SERPL-MCNC: 128 MG/DL (ref 65–140)
GLUCOSE SERPL-MCNC: 146 MG/DL (ref 65–140)
GLUCOSE SERPL-MCNC: 155 MG/DL (ref 65–140)
GLUCOSE SERPL-MCNC: 236 MG/DL (ref 65–140)
GLUCOSE SERPL-MCNC: 241 MG/DL (ref 65–140)
HCT VFR BLD AUTO: 29.9 % (ref 34.8–46.1)
HGB BLD-MCNC: 9.4 G/DL (ref 11.5–15.4)
MCH RBC QN AUTO: 32.8 PG (ref 26.8–34.3)
MCHC RBC AUTO-ENTMCNC: 31.4 G/DL (ref 31.4–37.4)
MCV RBC AUTO: 104 FL (ref 82–98)
PLATELET # BLD AUTO: 145 THOUSANDS/UL (ref 149–390)
PMV BLD AUTO: 13.5 FL (ref 8.9–12.7)
POTASSIUM SERPL-SCNC: 4.3 MMOL/L (ref 3.5–5.3)
PROT SERPL-MCNC: 6.9 G/DL (ref 6.4–8.4)
PTH-INTACT SERPL-MCNC: 4.7 PG/ML (ref 12–88)
RBC # BLD AUTO: 2.87 MILLION/UL (ref 3.81–5.12)
SODIUM SERPL-SCNC: 145 MMOL/L (ref 135–147)
T4 FREE SERPL-MCNC: 1.08 NG/DL (ref 0.61–1.12)
WBC # BLD AUTO: 6.25 THOUSAND/UL (ref 4.31–10.16)

## 2024-10-19 PROCEDURE — 82397 CHEMILUMINESCENT ASSAY: CPT

## 2024-10-19 PROCEDURE — 99223 1ST HOSP IP/OBS HIGH 75: CPT | Performed by: STUDENT IN AN ORGANIZED HEALTH CARE EDUCATION/TRAINING PROGRAM

## 2024-10-19 PROCEDURE — 87081 CULTURE SCREEN ONLY: CPT | Performed by: STUDENT IN AN ORGANIZED HEALTH CARE EDUCATION/TRAINING PROGRAM

## 2024-10-19 PROCEDURE — 76775 US EXAM ABDO BACK WALL LIM: CPT

## 2024-10-19 PROCEDURE — 82948 REAGENT STRIP/BLOOD GLUCOSE: CPT

## 2024-10-19 PROCEDURE — 85027 COMPLETE CBC AUTOMATED: CPT

## 2024-10-19 PROCEDURE — 80053 COMPREHEN METABOLIC PANEL: CPT

## 2024-10-19 PROCEDURE — 82330 ASSAY OF CALCIUM: CPT

## 2024-10-19 RX ORDER — SODIUM CHLORIDE, SODIUM GLUCONATE, SODIUM ACETATE, POTASSIUM CHLORIDE, MAGNESIUM CHLORIDE, SODIUM PHOSPHATE, DIBASIC, AND POTASSIUM PHOSPHATE .53; .5; .37; .037; .03; .012; .00082 G/100ML; G/100ML; G/100ML; G/100ML; G/100ML; G/100ML; G/100ML
75 INJECTION, SOLUTION INTRAVENOUS CONTINUOUS
Status: DISCONTINUED | OUTPATIENT
Start: 2024-10-19 | End: 2024-10-20

## 2024-10-19 RX ORDER — ACETAMINOPHEN 325 MG/1
650 TABLET ORAL ONCE
Status: COMPLETED | OUTPATIENT
Start: 2024-10-20 | End: 2024-10-20

## 2024-10-19 RX ORDER — CALCITONIN SALMON 200 [USP'U]/ML
100 INJECTION, SOLUTION INTRAMUSCULAR; SUBCUTANEOUS EVERY 12 HOURS SCHEDULED
Status: DISCONTINUED | OUTPATIENT
Start: 2024-10-19 | End: 2024-10-19

## 2024-10-19 RX ORDER — CALCITONIN SALMON 200 [USP'U]/ML
100 INJECTION, SOLUTION INTRAMUSCULAR; SUBCUTANEOUS EVERY 12 HOURS SCHEDULED
Status: COMPLETED | OUTPATIENT
Start: 2024-10-19 | End: 2024-10-20

## 2024-10-19 RX ORDER — CALCITONIN SALMON 200 [USP'U]/ML
4 INJECTION, SOLUTION INTRAMUSCULAR; SUBCUTANEOUS EVERY 12 HOURS SCHEDULED
Status: DISCONTINUED | OUTPATIENT
Start: 2024-10-19 | End: 2024-10-19

## 2024-10-19 RX ORDER — CALCITONIN SALMON 200 [USP'U]/ML
4 INJECTION, SOLUTION INTRAMUSCULAR; SUBCUTANEOUS EVERY 12 HOURS SCHEDULED
Status: COMPLETED | OUTPATIENT
Start: 2024-10-19 | End: 2024-10-19

## 2024-10-19 RX ORDER — INSULIN LISPRO 100 [IU]/ML
1-5 INJECTION, SOLUTION INTRAVENOUS; SUBCUTANEOUS EVERY 6 HOURS SCHEDULED
Status: DISCONTINUED | OUTPATIENT
Start: 2024-10-19 | End: 2024-11-04 | Stop reason: HOSPADM

## 2024-10-19 RX ADMIN — CARBIDOPA AND LEVODOPA 2 TABLET: 25; 100 TABLET, EXTENDED RELEASE ORAL at 17:31

## 2024-10-19 RX ADMIN — CALCITONIN SALMON 154 UNITS: 200 INJECTION, SOLUTION INTRAMUSCULAR; SUBCUTANEOUS at 01:09

## 2024-10-19 RX ADMIN — VALPROIC ACID 125 MG: 250 SOLUTION ORAL at 08:44

## 2024-10-19 RX ADMIN — INSULIN LISPRO 1 UNITS: 100 INJECTION, SOLUTION INTRAVENOUS; SUBCUTANEOUS at 06:09

## 2024-10-19 RX ADMIN — HEPARIN SODIUM 5000 UNITS: 5000 INJECTION INTRAVENOUS; SUBCUTANEOUS at 06:09

## 2024-10-19 RX ADMIN — CARBIDOPA AND LEVODOPA 2 TABLET: 25; 100 TABLET, EXTENDED RELEASE ORAL at 08:44

## 2024-10-19 RX ADMIN — CITALOPRAM HYDROBROMIDE 20 MG: 20 TABLET, FILM COATED ORAL at 08:45

## 2024-10-19 RX ADMIN — VALPROIC ACID 125 MG: 250 SOLUTION ORAL at 00:25

## 2024-10-19 RX ADMIN — SODIUM CHLORIDE, SODIUM GLUCONATE, SODIUM ACETATE, POTASSIUM CHLORIDE, MAGNESIUM CHLORIDE, SODIUM PHOSPHATE, DIBASIC, AND POTASSIUM PHOSPHATE 75 ML/HR: .53; .5; .37; .037; .03; .012; .00082 INJECTION, SOLUTION INTRAVENOUS at 22:42

## 2024-10-19 RX ADMIN — INSULIN LISPRO 2 UNITS: 100 INJECTION, SOLUTION INTRAVENOUS; SUBCUTANEOUS at 18:28

## 2024-10-19 RX ADMIN — TRAZODONE HYDROCHLORIDE 100 MG: 100 TABLET ORAL at 22:42

## 2024-10-19 RX ADMIN — PANTOPRAZOLE SODIUM 20 MG: 40 INJECTION, POWDER, FOR SOLUTION INTRAVENOUS at 08:44

## 2024-10-19 RX ADMIN — SODIUM CHLORIDE, SODIUM GLUCONATE, SODIUM ACETATE, POTASSIUM CHLORIDE, MAGNESIUM CHLORIDE, SODIUM PHOSPHATE, DIBASIC, AND POTASSIUM PHOSPHATE 100 ML/HR: .53; .5; .37; .037; .03; .012; .00082 INJECTION, SOLUTION INTRAVENOUS at 08:42

## 2024-10-19 RX ADMIN — HEPARIN SODIUM 5000 UNITS: 5000 INJECTION INTRAVENOUS; SUBCUTANEOUS at 22:42

## 2024-10-19 RX ADMIN — CALCITONIN SALMON 100 UNITS: 200 INJECTION, SOLUTION INTRAMUSCULAR; SUBCUTANEOUS at 12:49

## 2024-10-19 RX ADMIN — SODIUM CHLORIDE 100 ML/HR: 0.9 INJECTION, SOLUTION INTRAVENOUS at 00:07

## 2024-10-19 RX ADMIN — OXYBUTYNIN CHLORIDE 5 MG: 5 TABLET ORAL at 00:25

## 2024-10-19 RX ADMIN — PRAVASTATIN SODIUM 40 MG: 40 TABLET ORAL at 17:31

## 2024-10-19 RX ADMIN — ARIPIPRAZOLE 2 MG: 2 TABLET ORAL at 01:09

## 2024-10-19 RX ADMIN — OXYBUTYNIN CHLORIDE 5 MG: 5 TABLET ORAL at 17:31

## 2024-10-19 RX ADMIN — OXYBUTYNIN CHLORIDE 5 MG: 5 TABLET ORAL at 08:44

## 2024-10-19 RX ADMIN — OXYCODONE HYDROCHLORIDE AND ACETAMINOPHEN 1000 MG: 500 TABLET ORAL at 08:44

## 2024-10-19 RX ADMIN — INSULIN LISPRO 2 UNITS: 100 INJECTION, SOLUTION INTRAVENOUS; SUBCUTANEOUS at 12:48

## 2024-10-19 RX ADMIN — HEPARIN SODIUM 5000 UNITS: 5000 INJECTION INTRAVENOUS; SUBCUTANEOUS at 14:03

## 2024-10-19 RX ADMIN — FERROUS SULFATE 300 MG: 300 SOLUTION ORAL at 08:44

## 2024-10-19 RX ADMIN — ARIPIPRAZOLE 2 MG: 2 TABLET ORAL at 22:42

## 2024-10-19 RX ADMIN — VALPROIC ACID 125 MG: 250 SOLUTION ORAL at 22:42

## 2024-10-19 RX ADMIN — TRAZODONE HYDROCHLORIDE 100 MG: 100 TABLET ORAL at 00:25

## 2024-10-19 RX ADMIN — OXYBUTYNIN CHLORIDE 5 MG: 5 TABLET ORAL at 22:42

## 2024-10-19 RX ADMIN — LEVOTHYROXINE SODIUM 25 MCG: 25 TABLET ORAL at 06:09

## 2024-10-19 NOTE — CONSULTS
Nutrition Note:    Consult received for nutrition assessment + TF recommendations.  Spoke with kitchen-currently out of Glucerna 1.2 but able to receive 1 case from [facility]. Arriving today.  See rec's below.    Recommend adjusting TF order back to home regimen: Glucerna 1.2 @80ml/hr x12hrs (6pm-6am). This provides 960ml formula, 1152kcal, 57.6g protein, 9.12g CHO per hr.  FWF 170ml q4hrs. (1792ml total water with flushes)   If able, recommend adjusting synthroid admin time to be atleast 1 hr post TF stop or 1 hr pre TF start time. Notified MD.

## 2024-10-19 NOTE — ASSESSMENT & PLAN NOTE
Lab Results   Component Value Date    HGBA1C 6.2 (H) 08/13/2024       Recent Labs     10/19/24  0116 10/19/24  0607   POCGLU 128 155*       Blood Sugar Average: Last 72 hrs:  (P) 141.5  HbA1c 6.2  Advised to maintain a good DM control to prevent progression of CKD   Maintain healthy diet   Management as per primary team

## 2024-10-19 NOTE — CONSULTS
NEPHROLOGY HOSPITAL CONSULTATION   Terrie Alicea 64 y.o. female MRN: [id number]  Unit/Bed#: S -01 Encounter: [id number]    Brief History of Admission - 64 y.o. woman  with PMH of diabetes, Parkinson disease, hyperlipidemia p/w hypercalcemia and SCAR.  Nephrology is consulted for management of SCAR.    Assessment & Plan  Hypercalcemia  Serum calcium 14.2 mg/dL on admission  Started on IV fluids overnight and calcitonin  Current calcium 12.4 mg/dL  Discontinue NS due to hyperchloremia and transition to Plasma-Lyte to avoid sodium load  Repeat calcitonin today 100 twice daily x 2  PTH pending, monitor ionized calcium  SCAR (acute kidney injury) (HCC)  #Non-Oliguric KDIGO SCAR stage 3 3 severe  Etiology: Likely secondary to vasoconstriction and hemodynamic changes in the settings of hyperkalemia  Baseline creatinine 0.8 mg/dL  Peak creatinine: 2.8 mg/dL  Current creatinine: 2.6 mg/dL, started to improve  UA: No hematuria  Renal imaging : Ordered  Treatment:  No urgent indication of dialysis at this time  Maintain MAP:  Over 65 mmHg if possible/avoid hypoperfusion:  Hold parameters on blood pressure medications  Avoid nephrotoxic agents such as NSAIDs, and IV contrast if possible. Avoid opioids   Adjust medications to GFR    Cerebral palsy (HCC)  Immobilization complicated with hypercalcemia  IV fluids as above  Diabetes mellitus type 2, insulin dependent (HCC)  Lab Results   Component Value Date    HGBA1C 6.2 (H) 08/13/2024       Recent Labs     10/19/24  0116 10/19/24  0607   POCGLU 128 155*       Blood Sugar Average: Last 72 hrs:  (P) 141.5  HbA1c 6.2  Advised to maintain a good DM control to prevent progression of CKD   Maintain healthy diet   Management as per primary team  Iron deficiency anemia  Hemoglobin 9.4 mg/dL  Management as per primary team  Metabolic alkalosis  serum HCO3 28 mmol/L  metabolic alkalosis likely secondary to poor fluid intake     I have reviewed the nephrology recommendations including  "Plasmalyte and calcitonin , with primary team , and we are in agreement with renal plan including the information outlined above.    Portions of the record may have been created with voice recognition software. Occasional wrong word or \"sound a like\" substitutions may have occurred due to the inherent limitations of voice recognition software. Read the chart carefully and recognize, using context, where substitutions have occurred.If you have any questions, please contact the dictating provider.    HISTORY OF PRESENT ILLNESS:  Requesting Physician: Nicolas Lopez DO  Reason for Consult: Hypercalcemia and SCAR    Terrie Alicea is a 64 y.o. female with PMH of diabetes, Parkinson disease, hyperlipidemia who was admitted to [facility] after presenting with abnormal labs with SCAR and hypercalcemia. A renal consultation is requested today for assistance in the management of hypercalcemia.    PAST MEDICAL HISTORY:  Past Medical History:   Diagnosis Date    Acute metabolic encephalopathy 12/11/2015    Adjustment disorder     Altered mental status 12/11/2015    Anemia     Bipolar 1 disorder ([facility])     Cerebral palsy ([facility])     Chronic hypernatremia 02/06/2016    Closed fracture of left hip ([facility]) 01/19/2016    Closed left hip fracture ([facility])     no surgery    Constipation     Dehydration 02/20/2016    Developmental delay     Diabetes mellitus ([facility])     Difficulty walking     Disease of thyroid gland     Diverticulosis     Dysphagia     Worsening    Fracture of multiple ribs of right side     Herpes zoster without complication 06/02/2021    Hip fracture ([facility]) 07/26/2015    left    Hyperlipidemia     Hypernatremia 12/28/2018    Hypotension     Impulse control disorder     Incontinence     Intellectual disability due to developmental disorder, unspecified     Microalbuminuria     Neuropathy in diabetes ([facility])     Osteopathia     Osteoporosis     Peripheral neuropathy     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[facility])     Thrombocytopenia ([facility]) 07/31/2015 "       PAST SURGICAL HISTORY:  Past Surgical History:   Procedure Laterality Date    ABDOMINAL SURGERY      COLECTOMY MIN      re-anastomosis 7/22/16    COLON SURGERY  1/31/16    COLONOSCOPY N/A 07/21/2016    Procedure: COLONOSCOPY;  Surgeon: Rosalino Jacome MD;  Location: [facility] GI LAB;  Service:     COLONOSCOPY N/A 01/31/2016    Procedure: COLONOSCOPY;  Surgeon: Rosalino Jacome MD;  Location: [facility] MAIN OR;  Service:     COLONOSCOPY N/A 07/25/2017    Procedure: COLONOSCOPY;  Surgeon: Rosalino Jacome MD;  Location: [facility];  Service: Colorectal    COLOSTOMY      EXPLORATORY LAPAROTOMY W/ BOWEL RESECTION N/A 01/31/2016    Procedure: exploratory laparotomy, left sigmoidectomy, coloproctostomy, take down splenic flexure, loop colostomy;  Surgeon: Rosalino Jacome MD;  Location: [facility] MAIN OR;  Service:     GASTROSTOMY TUBE PLACEMENT N/A 8/26/2024    Procedure: INSERTION GASTROSTOMY TUBE OPEN;  Surgeon: Monroe Jaen DO;  Location: [facility] Main OR;  Service: General    ID CLOSURE ENTEROSTOMY LG/SMALL INTESTINE N/A 07/22/2016    Procedure: SEGMENTAL COLECTOMY WITH COLOCOLOSTOMY;  Surgeon: Rosalino Jacome MD;  Location: [facility] MA[facility] OR;  Service: Colorectal    UPPER GASTROINTESTINAL ENDOSCOPY         ALLERGIES:  Allergies   Allergen Reactions    Ingrezza [Valbenazine Tosylate] Other (See Comments)     Behavioral changes per caregiver    Keflex [Cephalexin] GI Intolerance       SOCIAL HISTORY:  Social History     Substance and Sexual Activity   Alcohol Use Not Currently     Social History     Substance and Sexual Activity   Drug Use No     Social History     Tobacco Use   Smoking Status Never   Smokeless Tobacco Never       FAMILY HISTORY:  Family History   Problem Relation Age of Onset    Alcohol abuse Mother     Alcohol abuse Father     Diabetes Sister     No Known Problems Sister     No Known Problems Sister        MEDICATIONS:    Current Facility-Administered Medications:     ARIPiprazole (ABILIFY) tablet 2 mg, 2  mg, Per G Tube, HS, Leonidas Sharma MD, 2 mg at 10/19/24 0109    ascorbic acid (VITAMIN C) tablet 1,000 mg, 1,000 mg, Per G Tube, Daily, Leonidas Sharma MD    calcitonin (salmon) (MIACALCIN) injection 154 Units, 4 Units/kg, Intramuscular, Q12H ROSEMARY, Leonidas Sharma MD    carbidopa-levodopa (SINEMET CR)  mg per ER tablet 2 tablet, 2 tablet, Oral, BID, Leonidas Sharma MD    citalopram (CeleXA) tablet 20 mg, 20 mg, Per G Tube, Daily, Leonidas Sharma MD    Ferrous Sulfate oral syrup 300 mg, 300 mg, Per G Tube, Every Other Day, Leonidas Sharma MD    heparin (porcine) subcutaneous injection 5,000 Units, 5,000 Units, Subcutaneous, Q8H ROSEMARY, Leonidas Sharma MD, 5,000 Units at 10/19/24 0609    influenza vaccine, recombinant (PF) (Flublok) IM injection 0.5 mL, 0.5 mL, Intramuscular, Once, Nicolas Lopez DO    insulin lispro (HumALOG/ADMELOG) 100 units/mL subcutaneous injection 1-5 Units, 1-5 Units, Subcutaneous, Q6H ROSEMARY, 1 Units at 10/19/24 0609 **AND** Fingerstick Glucose (POCT), , , Q6H, Leonidas Sharma MD    Ketotifen Fumarate (ZADITOR) 0.035 % ophthalmic solution 1 drop, 1 drop, Both Eyes, BID PRN, Leonidas Sharma MD    levothyroxine tablet 25 mcg, 25 mcg, Per G Tube, Early Morning, Leonidas Sharma MD, 25 mcg at 10/19/24 0609    midodrine (PROAMATINE) tablet 5 mg, 5 mg, Per G Tube, TID PRN, Leonidas Sharma MD    oxybutynin (DITROPAN) tablet 5 mg, 5 mg, Per G Tube, TID, Leonidas Sharma MD, 5 mg at 10/19/24 0025    pantoprazole (PROTONIX) injection 20 mg, 20 mg, Intravenous, Q24H ROSEMARY, Leonidas Sharma MD    polyethylene glycol (MIRALAX) packet 17 g, 17 g, Per G Tube, Daily PRN, Leonidas Sharma MD    pravastatin (PRAVACHOL) tablet 40 mg, 40 mg, Per G Tube, Daily With Dinner, Leonidas Sharma MD    sodium chloride 0.9 % bolus 1,000 mL, 1,000 mL, Intravenous, Once, Leonidas Sharma MD    sodium chloride 0.9 % infusion, 100 mL/hr, Intravenous, Continuous, Leonidas Sharma MD, Last Rate: 100 mL/hr at 10/19/24 0007, 100 mL/hr at 10/19/24 0007    traZODone (DESYREL)  tablet 100 mg, 100 mg, Per G Tube, HS, Leonidas Sharma MD, 100 mg at 10/19/24 0025    valproic acid (DEPAKENE) oral soln 125 mg, 125 mg, Per G Tube, BID, Leonidas Sharma MD, 125 mg at 10/19/24 0025    REVIEW OF SYSTEMS:  Unable to complete Ros due to altered mental status    PHYSICAL EXAM:  Current Weight:    First Weight:    Vitals:    10/18/24 1843 10/18/24 1844 10/18/24 2350   BP:  148/72 118/67   Pulse: 83 80 78   Resp:  16 18   Temp:  97.5 °F (36.4 °C) 98.8 °F (37.1 °C)   TempSrc:  Axillary    SpO2:  97% 96%       Intake/Output Summary (Last 24 hours) at 10/19/2024 0712  Last data filed at 10/18/2024 2244  Gross per 24 hour   Intake 1000 ml   Output --   Net 1000 ml     Physical Exam  General:  no acute distress at this time, chronically ill, cachectic  Skin:  No acute rash  Eyes:  No scleral icterus and noninjected  ENT:  mucous membranes moist  Neck:  no carotid bruits  Chest:  Clear to auscultation percussion, good respiratory effort, no use of accessory respiratory muscles  CVS:  Regular rate and rhythm without rub   Abdomen:  soft and nontender   Extremities: no significant lower extremity edema  Neuro: Cerebral palsy  Psych: Nonverbal  e    Invasive Devices:      Lab Results:   Results from last 7 days   Lab Units 10/19/24  0436 10/19/24  0037 10/18/24  1959 10/18/24  1518   WBC Thousand/uL 6.25  --  6.82 6.90   HEMOGLOBIN g/dL 9.4*  --  10.4* 11.0*   HEMATOCRIT % 29.9*  --  32.4* 34.4*   PLATELETS Thousands/uL 145*  --  156 173   POTASSIUM mmol/L 4.3  --  4.5 4.6   CHLORIDE mmol/L 109*  --  99 98   CO2 mmol/L 28  --  36* 36*   BUN mg/dL 61*  --  70* 72*   CREATININE mg/dL 2.62*  --  2.86* 2.83*   CALCIUM mg/dL 12.4* 13.5* 14.8* 14.2*   ALK PHOS U/L 47  --  54 54   ALT U/L <3*  --  3* 5*   AST U/L 3*  --  4* 5*     Other Studies:

## 2024-10-19 NOTE — ASSESSMENT & PLAN NOTE
Recent Labs     10/18/24  1518 10/18/24  1959   CREATININE 2.83* 2.86*   EGFR 17 16     Estimated Creatinine Clearance: 12.4 mL/min (A) (by C-G formula based on SCr of 2.86 mg/dL (H)).    POA 2.83; (baseline 0.8-1)  Likely prerenal due to decreased p.o. intake(was not getting adequate fluid from tube feed, per care nurse was getting 1700 mL a day)    Plan:  IV Fluids isotonic saline at 100 mL/h  Monitor BMP  Avoid hypoperfusion of the kidneys, minimize nephrotoxins/IV contrast if possible  RAAS Blockers/Diuretics held: None

## 2024-10-19 NOTE — ASSESSMENT & PLAN NOTE
Continue PTA levothyroxine  TSH is normal on admission  Ordered free T4 to look for subclinical hyperthyroidism as a cause of hypercalcemia

## 2024-10-19 NOTE — PLAN OF CARE
Problem: Prexisting or High Potential for Compromised Skin Integrity  Goal: Skin integrity is maintained or improved  Description: INTERVENTIONS:  - Identify patients at risk for skin breakdown  - Assess and monitor skin integrity  - Assess and monitor nutrition and hydration status  - Monitor labs   - Assess for incontinence   - Turn and reposition patient  - Assist with mobility/ambulation  - Relieve pressure over bony prominences  - Avoid friction and shearing  - Provide appropriate hygiene as needed including keeping skin clean and dry  - Evaluate need for skin moisturizer/barrier cream  - Collaborate with interdisciplinary team   - Patient/family teaching  - Consider wound care consult   Outcome: Progressing     Problem: PAIN - ADULT  Goal: Verbalizes/displays adequate comfort level or baseline comfort level  Description: Interventions:  - Encourage patient to monitor pain and request assistance  - Assess pain using appropriate pain scale  - Administer analgesics based on type and severity of pain and evaluate response  - Implement non-pharmacological measures as appropriate and evaluate response  - Consider cultural and social influences on pain and pain management  - Notify physician/advanced practitioner if interventions unsuccessful or patient reports new pain  Outcome: Progressing     Problem: INFECTION - ADULT  Goal: Absence or prevention of progression during hospitalization  Description: INTERVENTIONS:  - Assess and monitor for signs and symptoms of infection  - Monitor lab/diagnostic results  - Monitor all insertion sites, i.e. indwelling lines, tubes, and drains  - Monitor endotracheal if appropriate and nasal secretions for changes in amount and color  - [place] appropriate cooling/warming therapies per order  - Administer medications as ordered  - Instruct and encourage patient and family to use good hand hygiene technique  - Identify and instruct in appropriate isolation precautions for  identified infection/condition  Outcome: Progressing  Goal: Absence of fever/infection during neutropenic period  Description: INTERVENTIONS:  - Monitor WBC    Outcome: Progressing     Problem: SAFETY ADULT  Goal: Patient will remain free of falls  Description: INTERVENTIONS:  - Educate patient/family on patient safety including physical limitations  - Instruct patient to call for assistance with activity   - Consult OT/PT to assist with strengthening/mobility   - Keep Call bell within reach  - Keep bed low and locked with side rails adjusted as appropriate  - Keep care items and personal belongings within reach  - Initiate and maintain comfort rounds  - Make Fall Risk Sign visible to staff  - Initiate/Maintain bed alarm  - Obtain necessary fall risk management equipment: wheelchair  - Apply yellow socks and bracelet for high fall risk patients  - Consider moving patient to room near nurses station  Outcome: Progressing  Goal: Maintain or return to baseline ADL function  Description: INTERVENTIONS:  -  Assess patient's ability to carry out ADLs; assess patient's baseline for ADL function and identify physical deficits which impact ability to perform ADLs (bathing, care of mouth/teeth, toileting, grooming, dressing, etc.)  - Assess/evaluate cause of self-care deficits   - Assess range of motion  - Assess patient's mobility; develop plan if impaired  - Assess patient's need for assistive devices and provide as appropriate  - Encourage maximum independence but intervene and supervise when necessary  - Involve family in performance of ADLs  - Assess for home care needs following discharge   - Consider OT consult to assist with ADL evaluation and planning for discharge  - Provide patient education as appropriate  Outcome: Progressing  Goal: Maintains/Returns to pre admission functional level  Description: INTERVENTIONS:  - Perform AM-PAC 6 Click Basic Mobility/ Daily Activity assessment daily.  - Set and communicate  daily mobility goal to care team and patient/family/caregiver.   - Collaborate with rehabilitation services on mobility goals if consulted  - Perform Range of Motion 3 times a day.  - Reposition patient every 2 hours.  - Out of bed to chair 3 times a day   - Out of bed for toileting  - Record patient progress and toleration of activity level   Outcome: Progressing     Problem: DISCHARGE PLANNING  Goal: Discharge to home or other facility with appropriate resources  Description: INTERVENTIONS:  - Identify barriers to discharge w/patient and caregiver  - Arrange for needed discharge resources and transportation as appropriate  - Identify discharge learning needs (meds, wound care, etc.)  - Arrange for interpretive services to assist at discharge as needed  - Refer to Case Management Department for coordinating discharge planning if the patient needs post-hospital services based on physician/advanced practitioner order or complex needs related to functional status, cognitive ability, or social support system  Outcome: Progressing     Problem: Knowledge Deficit  Goal: Patient/family/caregiver demonstrates understanding of disease process, treatment plan, medications, and discharge instructions  Description: Complete learning assessment and assess knowledge base.  Interventions:  - Provide teaching at level of understanding  - Provide teaching via preferred learning methods  Outcome: Progressing

## 2024-10-19 NOTE — ASSESSMENT & PLAN NOTE
Recent Labs     10/18/24  1518 10/18/24  1959   CALCIUM 14.2* 14.8*   ALB 4.4 4.3     Presented with abnormal lab work showing hypercalcemia of 14.2, repeat CMP showed calcium of 14.8  Per care nurse her mental status was abnormal yesterday but it improved prior to coming to the ER  Corrected calcium: 14.6, iCal: 1.68, TSH: 0.8  Nephrology was reach out to in the ED recommended calcitonin  Etiology: Unclear right now, possibly from severe dehydration/possible malignancy considering multiple intermediate nodular densities in right breast(reflecting trauma/fat necrosis) in CT scan in 9/2024    Plan:  Ordered test dose of calcitonin no allergic response to it, placed order for calcitonin 4 units/kg every 12 as per pharmacy for 24 hours to prevent tachyphylaxis  Follow-up PTH/vitamin D/PTHrp  Ordered repeat calcium at 12 AM today  Ordered repeat CMP/iCal AM draw  Hold calcium carbonate outpatient med  Consulted nephrology, defer further work up including SPEP/UPEP on nephro

## 2024-10-19 NOTE — ASSESSMENT & PLAN NOTE
#Non-Oliguric KDIGO SCAR stage 3 3 severe  Etiology: Likely secondary to vasoconstriction and hemodynamic changes in the settings of hyperkalemia  Baseline creatinine 0.8 mg/dL  Peak creatinine: 2.8 mg/dL  Current creatinine: 2.6 mg/dL, started to improve  UA: No hematuria  Renal imaging : Ordered  Treatment:  No urgent indication of dialysis at this time  Maintain MAP:  Over 65 mmHg if possible/avoid hypoperfusion:  Hold parameters on blood pressure medications  Avoid nephrotoxic agents such as NSAIDs, and IV contrast if possible. Avoid opioids   Adjust medications to GFR

## 2024-10-19 NOTE — ASSESSMENT & PLAN NOTE
Recent Labs     10/18/24  1518 10/18/24  1959   HGB 11.0* 10.4*   Baseline: 9-11, currently hemoglobin is baseline  Ordered iron studies  Daily CBC  Continue PTA ferrous sulfate  Transfuse if hemoglobin less than 7

## 2024-10-19 NOTE — ASSESSMENT & PLAN NOTE
"Lab Results   Component Value Date    HGBA1C 6.2 (H) 08/13/2024       No results for input(s): \"POCGLU\" in the last 72 hours.    Blood Sugar Average: Last 72 hrs:  Hold home regimen while inpatient and resume on discharge NovoLog 4 units at 6 PM and midnight while tube feeds are running and sliding scale  On Glucerna 1.2 tube feeds  Insulin regimen  Sliding scale  Glucose checks and Insulin correction ACHS  Goal -180 while admitted, adjusting insulin regimen as appropriate  Monitor for hypoglycemia and treat per protocol  "

## 2024-10-19 NOTE — UTILIZATION REVIEW
Initial Clinical Review    Admission: Date/Time/Statement:   Admission Orders (From admission, onward)       Ordered        10/18/24 2136  INPATIENT ADMISSION  Once                          Orders Placed This Encounter   Procedures    INPATIENT ADMISSION     Standing Status:   Standing     Number of Occurrences:   1     Order Specific Question:   Level of Care     Answer:   Med Surg [16]     Order Specific Question:   Estimated length of stay     Answer:   More than 2 Midnights     Order Specific Question:   Certification     Answer:   I certify that inpatient services are medically necessary for this patient for a duration of greater than two midnights. See H&P and MD Progress Notes for additional information about the patient's course of treatment.     ED Arrival Information       Expected   10/18/2024     Arrival   10/18/2024 18:20    Acuity   Urgent              Means of arrival   Wheelchair    Escorted by   Caregiver    Service   Hospitalist    Admission type   Emergency              Arrival complaint   Lab resutls             Chief Complaint   Patient presents with    Abnormal Lab     Pt had out pt lab work done and pts care giver was told pt in in ARF and to go to ER. Pt has not been herself but no specific concerns but intake has been down       Initial Presentation: 64 y.o. female  to ED via private vehicle from Group home.    Admitted to inpatient with Dx: Hypercalcemia/SCAR.  Presented to ED with abnormal lab work,  calcium of 14.2.  Since the day prior to arrival more nonverbal compared to baseline of usually awake and alert, intermittently verbalizes,  responds yes, no; totally dependent for care . PMHx:cerebral palsy nonverbal at baseline, diabetes, Parkinson disease, hyperlipidemia. On exam:  mucous membranes dry.  alert. Calcium 14.8.   bun 70. Creatinine 2.86 with baseline of 0.8 - 1.  ionized calcium 1.69.  H&H 10.4/32.4.  ED treatment:  1 liter IVF bolus x 2.  Given Miacalcin.    Plan includes  to  trend calcium and continue IVF.  Monitor BMP.   Follow-up PTH/vitamin D/PTHrp.  Repeat CMP and iCal in am.   Hold home calcium carbonate.   Consult nephrology.  Monitor mental status.  Hold Baclofen.   Start SSI.  On Glucerna tube feeds at baseline  .     Anticipated Length of Stay/Certification Statement:  Patient will be admitted on an inpatient basis with an anticipated length of stay of greater than 2 midnights secondary to severe hypercalcemia/SCAR.     Date: 10/19/24   Day 2:  on exam: cerebral palsy.  Non verbal.    Bun 61. Creatinine 2.62.  calcium 12.4.   continue hydration with IVF.   BMP in am.   Calcitonin as per nephrology.  Nutrition consult for tube feeds.  SSI     10/19/24 per nephrology - Hypercalcemia/SCAR likely secondary to vasoconstriction and hemodynamic changes in the settings of hypercalcemia/Metabolic alkalosis secondary to poor fluid intake/IDDM/Cerebral Palsy.   Baseline creatinine of 0.8.   current calcium 12.4. Discontinue NS due to hyperchloremia and transition to Plasma-Lyte to avoid sodium load . Repeat calcitonin today 100 twice daily x 2.   PTH pending, monitor ionized calcium.  Maintain MAP: Over 65 mmHg if possible/avoid hypoperfusion: Hold parameters on blood pressure medications.      Patient has crossed 3 midnights and requires ongoing care    10/20/2024 .  Patient presents with  being more alert today.     On exam: mouth dry with contracted extremities.   Appears ill  Abnormal labs or imaging:  bun 50. Creatinine 2.29.  calcium 10.1.  glucose 315>269  Diagnosis/Plan    Hypercalcemia/SCAR .  Dc plasma lyte.  Increase free water flush ot 200 ml.  Maintain MAP: Over 65 mmHg if possible/avoid hypoperfusion .  Start Lantus.  SSI.     ED Treatment-Medication Administration from 10/18/2024 1724 to 10/18/2024 2252         Date/Time Order Dose Route Action     10/18/2024 2002 sodium chloride 0.9 % bolus 1,000 mL 1,000 mL Intravenous New Bag            Scheduled Medications:  ARIPiprazole,  2 mg, Per G Tube, HS  vitamin C, 1,000 mg, Per G Tube, Daily  carbidopa-levodopa, 2 tablet, Oral, BID  citalopram, 20 mg, Per G Tube, Daily  Ferrous Sulfate, 300 mg, Per G Tube, Every Other Day  heparin (porcine), 5,000 Units, Subcutaneous, Q8H ROSEMARY  influenza vaccine, 0.5 mL, Intramuscular, Once  insulin glargine, 10 Units, Subcutaneous, QAM  insulin lispro, 1-5 Units, Subcutaneous, Q6H ROSEMARY  levothyroxine, 25 mcg, Per G Tube, Early Morning  oxybutynin, 5 mg, Per G Tube, TID  pantoprazole, 20 mg, Intravenous, Q24H ROSEMARY  pravastatin, 40 mg, Per G Tube, Daily With Dinner  sodium chloride, 1,000 mL, Intravenous, Once  traZODone, 100 mg, Per G Tube, HS  valproic acid, 125 mg, Per G Tube, BID    acetaminophen (TYLENOL) tablet 650 mg  Dose: 650 mg  Freq: Once Route: PO  Start: 10/20/24 0000 End: 10/20/24 0107     calcitonin (salmon) (MIACALCIN) injection 100 Units  Dose: 100 Units  Freq: Every 12 hours scheduled Route: IM  Indications of Use: HYPERCALCEMIA  Start: 10/19/24 1300 End: 10/20/24 0431  calcitonin (salmon) (MIACALCIN) injection 154 Units  Dose: 4 Units/kg  Weight Dosing Info: 38.6 kg  Freq: Every 12 hours scheduled Route: IM  Indications of Use: HYPERCALCEMIA  Start: 10/19/24 0100 End: 10/19/24 0109    Continuous IV Infusions:  multi-electrolyte (PLASMALYTE-A/ISOLYTE-S PH 7.4) IV solution  Rate: 75 mL/hr Dose: 75 mL/hr  Freq: Continuous Route: IV  Indications of Use: IV Hydration  Last Dose: Stopped (10/20/24 0751)  Start: 10/19/24 0730 End: 10/20/24 0841  sodium chloride 0.9 % infusion  Rate: 100 mL/hr Dose: 100 mL/hr  Freq: Continuous Route: IV  Indications of Use: IV Hydration  Last Dose: 100 mL/hr (10/19/24 0007)  Start: 10/18/24 2115 End: 10/19/24 0922        PRN Meds: not used.   acetaminophen, 650 mg, Oral, Q6H PRN  Ketotifen Fumarate, 1 drop, Both Eyes, BID PRN  midodrine, 5 mg, Per G Tube, TID PRN  polyethylene glycol, 17 g, Per G Tube, Daily PRN      ED Triage Vitals   Temperature Pulse Respirations  Blood Pressure SpO2 Pain Score   10/18/24 1844 10/18/24 1843 10/18/24 1844 10/18/24 1844 10/18/24 1844 10/20/24 0107   97.5 °F (36.4 °C) 83 16 148/72 97 % Med Not Given for Pain - for MAR use only     Weight (last 2 days)       None          Vital Signs (last 3 days)       Date/Time Temp Pulse Resp BP MAP (mmHg) SpO2 Pain    10/20/24 07:37:31 99.4 °F (37.4 °C) 78 16 128/71 90 97 % --    10/20/24 0107 -- -- -- -- -- -- Med Not Given for Pain - for MAR use only    10/19/24 23:21:41 100 °F (37.8 °C) 84 16 111/61 78 96 % --    10/19/24 16:17:27 -- 90 -- 120/83 95 96 % --    10/19/24 15:39:16 99.3 °F (37.4 °C) 95 16 125/101 109 97 % --    10/19/24 07:57:05 98.5 °F (36.9 °C) 81 -- 121/70 87 97 % --    10/19/24 07:54:36 98.5 °F (36.9 °C) 84 16 94/46 62 98 % --    10/18/24 23:50:21 98.8 °F (37.1 °C) 78 18 118/67 84 96 % --    10/18/24 1844 97.5 °F (36.4 °C) 80 16 148/72 -- 97 % --    10/18/24 1843 -- 83 -- -- -- -- --              Pertinent Labs/Diagnostic Test Results:   Radiology:  US kidney and bladder   Final Interpretation by Austin Nelson MD (10/19 1250)   Study technically limited.      Bilateral small echogenic kidneys of medical renal disease.   Possible debris in the bladder. Correlate with urinalysis.            Workstation performed: [id number]             Results from last 7 days   Lab Units 10/19/24  0436 10/18/24  1959 10/18/24  1518   WBC Thousand/uL 6.25 6.82 6.90   HEMOGLOBIN g/dL 9.4* 10.4* 11.0*   HEMATOCRIT % 29.9* 32.4* 34.4*   PLATELETS Thousands/uL 145* 156 173   TOTAL NEUT ABS Thousands/µL  --  4.56 4.56     Results from last 7 days   Lab Units 10/20/24  0532 10/19/24  0436 10/19/24  0037 10/18/24  1959 10/18/24  1518   SODIUM mmol/L 144 145  --  144 141   POTASSIUM mmol/L 4.6 4.3  --  4.5 4.6   CHLORIDE mmol/L 108 109*  --  99 98   CO2 mmol/L 29 28  --  36* 36*   ANION GAP mmol/L 7 8  --  9 7   BUN mg/dL 50* 61*  --  70* 72*   CREATININE mg/dL 2.49* 2.62*  --  2.86* 2.83*   EGFR  ml/min/1.73sq m 20 18  --  16 17   CALCIUM mg/dL 10.1 12.4* 13.5* 14.8* 14.2*   CALCIUM, IONIZED mmol/L 1.23 1.51*  --  1.69*  --    MAGNESIUM mg/dL 2.1  --   --   --   --    PHOSPHORUS mg/dL 3.2  --   --   --   --      Results from last 7 days   Lab Units 10/19/24  0436 10/18/24  1959 10/18/24  1518   AST U/L 3* 4* 5*   ALT U/L <3* 3* 5*   ALK PHOS U/L 47 54 54   TOTAL PROTEIN g/dL 6.9 8.0 8.1   ALBUMIN g/dL 3.7 4.3 4.4   TOTAL BILIRUBIN mg/dL 0.36 0.33 0.30     Results from last 7 days   Lab Units 10/20/24  0639 10/20/24  0050 10/19/24  1811 10/19/24  1241 10/19/24  0607 10/19/24  0116   POC GLUCOSE mg/dl 269* 315* 236* 241* 155* 128     Results from last 7 days   Lab Units 10/20/24  0532 10/19/24  0436 10/18/24  1959   GLUCOSE RANDOM mg/dL 278* 146* 95     Results from last 7 days   Lab Units 10/18/24  1959   TSH 3RD GENERATON uIU/mL 0.848     Results from last 7 days   Lab Units 10/18/24  1518   FERRITIN ng/mL 116   IRON SATURATION % 19   IRON ug/dL 66   TIBC ug/dL 340     Past Medical History:   Diagnosis Date    Acute metabolic encephalopathy 12/11/2015    Adjustment disorder     Altered mental status 12/11/2015    Anemia     Bipolar 1 disorder ([facility])     Cerebral palsy ([facility])     Chronic hypernatremia 02/06/2016    Closed fracture of left hip ([facility]) 01/19/2016    Closed left hip fracture ([facility])     no surgery    Constipation     Dehydration 02/20/2016    Developmental delay     Diabetes mellitus ([facility])     Difficulty walking     Disease of thyroid gland     Diverticulosis     Dysphagia     Worsening    Fracture of multiple ribs of right side     Herpes zoster without complication 06/02/2021    Hip fracture ([facility]) 07/26/2015    left    Hyperlipidemia     Hypernatremia 12/28/2018    Hypotension     Impulse control disorder     Incontinence     Intellectual disability due to developmental disorder, unspecified     Microalbuminuria     Neuropathy in diabetes ([facility])     Osteopathia     Osteoporosis     Peripheral  neuropathy     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HCC)     Thrombocytopenia (HCC) 07/31/2015     Present on Admission:   Hypercalcemia   SCAR (acute kidney injury) (HCC)   Cerebral palsy (HCC)   Bipolar disorder (HCC)   Iron deficiency anemia   Hypothyroidism    Admitting Diagnosis: Hypercalcemia [E83.52]  SCAR (acute kidney injury) (HCC) [N17.9]  Age/Sex: 64 y.o. female    Network Utilization Review Department  ATTENTION: Please call with any questions or concerns to [phone] and carefully listen to the prompts so that you are directed to the right person. All voicemails are confidential.   For Discharge needs, contact Care Management DC Support Team at [phone] opt. [phone]  Send all requests for admission clinical reviews, approved or denied determinations and any other requests to dedicated fax number below belonging to the campus where the patient is receiving treatment. List of dedicated fax numbers for the Facilities:  FACILITY NAME UR FAX NUMBER   ADMISSION DENIALS (Administrative/Medical Necessity) [phone]   DISCHARGE SUPPORT TEAM (NETWORK) [phone]   PARENT CHILD HEALTH (Maternity/NICU/Pediatrics) [phone]   [facility] [phone]   [facility] [phone]   [facility] [phone]   [facility] [phone]   [facility] [phone]   [facility] [phone]   [facility] [phone]   [facility] [phone]   [facility] [phone]   [facility] [phone]   [facility] [phone]   [facility] [phone]

## 2024-10-19 NOTE — ASSESSMENT & PLAN NOTE
At baseline is non verbal , totally dependent on care, resides in intermediate care facility  Holding baclofen due to altered mental status prior to arrival

## 2024-10-19 NOTE — H&P
H&P - Hospitalist   Name: Terrie Alicea 64 y.o. female I MRN: [id number]  Unit/Bed#: S -01 I Date of Admission: 10/18/2024   Date of Service: 10/18/2024 I Hospital Day: 0     Assessment & Plan  Hypercalcemia  Recent Labs     10/18/24  1518 10/18/24  1957   CALCIUM 14.2* 14.8*   ALB 4.4 4.3     Presented with abnormal lab work showing hypercalcemia of 14.2, repeat CMP showed calcium of 14.8  Per care nurse her mental status was abnormal yesterday but it improved prior to coming to the ER  Corrected calcium: 14.6, iCal: 1.68, TSH: 0.8  Nephrology was reach out to in the ED recommended calcitonin  Etiology: Unclear right now, possibly from severe dehydration/possible malignancy considering multiple intermediate nodular densities in right breast(reflecting trauma/fat necrosis) in CT scan in 9/2024    Plan:  Ordered test dose of calcitonin no allergic response to it, placed order for calcitonin 4 units/kg every 12 as per pharmacy for 24 hours to prevent tachyphylaxis  Follow-up PTH/vitamin D/PTHrp  Ordered repeat calcium at 12 AM today  Ordered repeat CMP/iCal AM draw  Hold calcium carbonate outpatient med  Consulted nephrology, defer further work up including SPEP/UPEP on nephro  SCAR (acute kidney injury) (HCC)  Recent Labs     10/18/24  1518 10/18/24  1957   CREATININE 2.83* 2.86*   EGFR 17 16     Estimated Creatinine Clearance: 12.4 mL/min (A) (by C-G formula based on SCr of 2.86 mg/dL (H)).    POA 2.83; (baseline 0.8-1)  Likely prerenal due to decreased p.o. intake(was not getting adequate fluid from tube feed, per care nurse was getting 1700 mL a day)    Plan:  IV Fluids isotonic saline at 100 mL/h  Monitor BMP  Avoid hypoperfusion of the kidneys, minimize nephrotoxins/IV contrast if possible  RAAS Blockers/Diuretics held: None  Cerebral palsy (HCC)  At baseline is non verbal , totally dependent on care, resides in intermediate care facility  Holding baclofen due to altered mental status prior to  "arrival  Bipolar disorder (HCC)  Continue PTA Depakote/Abilify  Diabetes mellitus type 2, insulin dependent (HCC)  Lab Results   Component Value Date    HGBA1C 6.2 (H) 08/13/2024       No results for input(s): \"POCGLU\" in the last 72 hours.    Blood Sugar Average: Last 72 hrs:  Hold home regimen while inpatient and resume on discharge NovoLog 4 units at 6 PM and midnight while tube feeds are running and sliding scale  On Glucerna 1.2 tube feeds  Insulin regimen  Sliding scale  Glucose checks and Insulin correction ACHS  Goal -180 while admitted, adjusting insulin regimen as appropriate  Monitor for hypoglycemia and treat per protocol  Iron deficiency anemia  Recent Labs     10/18/24  1518 10/18/24  1957   HGB 11.0* 10.4*   Baseline: 9-11, currently hemoglobin is baseline  Ordered iron studies  Daily CBC  Continue PTA ferrous sulfate  Transfuse if hemoglobin less than 7  Hypothyroidism  Continue PTA levothyroxine  TSH is normal on admission  Ordered free T4 to look for subclinical hyperthyroidism as a cause of hypercalcemia      VTE Pharmacologic Prophylaxis: VTE Score: 4 Moderate Risk (Score 3-4) - Pharmacological DVT Prophylaxis Ordered: heparin.  Code Status: Level 1 - Full Code   Discussion with family: Updated  (Care nurse) at bedside.    Anticipated Length of Stay: Patient will be admitted on an inpatient basis with an anticipated length of stay of greater than 2 midnights secondary to severe hypercalcemia/SCAR.    History of Present Illness   Chief Complaint: Hypercalcemia/abnormal labs    Terrie Alicea is a 64 y.o. female with a PMH of cerebral palsy, type II DM, Parkinson's disease, HLD who presents with abnormal labs.  Patient is from intermediate care facilities, today she got her blood work done as outpatient showing calcium of 14.2, caretaker nurse was advised to bring her to the ED for further evaluation and monitoring.  At baseline patient has cerebral palsy and usually she is " alert and awake, she is semiverbal, per discussion with care nurse at bedside she told that patient sometimes responds to questions with yes or no and intermittently verbalize, according to her since yesterday patient has been more nonverbal/confused compared to her baseline but also mentioned that she has been improving since then, upon my evaluation at bedside patient was awake and alert and care nurse told me that currently she is at baseline which she has been since arrival in ED.   In ED repeat blood work showed hypercalcemia of 14.8 with iCal of 1.68, nephrology was contacted at the time recommended to start patient on isotonic saline at 100 mL/h, she also got 1 L bolus of normal saline and advised to started on calcitonin, per pharmacy she will get her calcitonin test dose if no allergic response then will order hypercalcemia dose.    Review of Systems   Unable to perform ROS: Patient nonverbal       Historical Information   Past Medical History:   Diagnosis Date    Acute metabolic encephalopathy 12/11/2015    Adjustment disorder     Altered mental status 12/11/2015    Anemia     Bipolar 1 disorder ([facility])     Cerebral palsy ([facility])     Chronic hypernatremia 02/06/2016    Closed fracture of left hip ([facility]) 01/19/2016    Closed left hip fracture ([facility])     no surgery    Constipation     Dehydration 02/20/2016    Developmental delay     Diabetes mellitus ([facility])     Difficulty walking     Disease of thyroid gland     Diverticulosis     Dysphagia     Worsening    Fracture of multiple ribs of right side     Herpes zoster without complication 06/02/2021    Hip fracture ([facility]) 07/26/2015    left    Hyperlipidemia     Hypernatremia 12/28/2018    Hypotension     Impulse control disorder     Incontinence     Intellectual disability due to developmental disorder, unspecified     Microalbuminuria     Neuropathy in diabetes ([facility])     Osteopathia     Osteoporosis     Peripheral neuropathy     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[facility])      Thrombocytopenia (HCC) 07/31/2015     Past Surgical History:   Procedure Laterality Date    ABDOMINAL SURGERY      COLECTOMY MIN      re-anastomosis 7/22/16    COLON SURGERY  1/31/16    COLONOSCOPY N/A 07/21/2016    Procedure: COLONOSCOPY;  Surgeon: Rosalino Jacome MD;  Location: [facility];  Service:     COLONOSCOPY N/A 01/31/2016    Procedure: COLONOSCOPY;  Surgeon: Rosalino Jacome MD;  [facility];  Service:     COLONOSCOPY N/A 07/25/2017    Procedure: COLONOSCOPY;  Surgeon: Rosalino Jacome MD;  Location: [facility];  Service: Colorectal    COLOSTOMY      EXPLORATORY LAPAROTOMY W/ BOWEL RESECTION N/A 01/31/2016    Procedure: exploratory laparotomy, left sigmoidectomy, coloproctostomy, take down splenic flexure, loop colostomy;  Surgeon: Rosalino Jacome MD;  [facility];  Service:     GASTROSTOMY TUBE PLACEMENT N/A 8/26/2024    Procedure: INSERTION GASTROSTOMY TUBE OPEN;  Surgeon: Monroe Jean DO;  [facility];  Service: General    MI CLOSURE ENTEROSTOMY LG/SMALL INTESTINE N/A 07/22/2016    Procedure: SEGMENTAL COLECTOMY WITH COLOCOLOSTOMY;  Surgeon: Rosalino Jacome MD;  [facility];  Service: Colorectal    UPPER GASTROINTESTINAL ENDOSCOPY       Social History     Tobacco Use    Smoking status: Never    Smokeless tobacco: Never   Vaping Use    Vaping status: Never Used   Substance and Sexual Activity    Alcohol use: Not Currently    Drug use: No    Sexual activity: Never     E-Cigarette/Vaping    E-Cigarette Use Never User      E-Cigarette/Vaping Substances    Nicotine No     THC No     CBD No     Flavoring No     Other No     Unknown No        Social History:  Marital Status: Single   Patient Pre-hospital Living Situation: Long Term Care Facility  Patient Pre-hospital Level of Mobility: unable to be assessed at time of evaluation  Patient Pre-hospital Diet Restrictions: Tube feeds Glucerna 1.2    Meds/Allergies   I have reviewed home medications with  patient family member.  Prior to Admission medications    Medication Sig Start Date End Date Taking? Authorizing Provider   ARIPiprazole (ABILIFY) 2 mg tablet 1 tablet by Per G Tube route daily at bedtime 6/8/22  Yes Historical Provider, MD   Ascorbic Acid (vitamin C) 1000 MG tablet 1 tablet (1,000 mg total) by Per G Tube route daily 10/10/24  Yes Lila Dodd,    baclofen 10 mg tablet 1 tablet (10 mg total) by Per G Tube route 3 (three) times a day 8/18/24  Yes Aden Mcgregor MD   calcium carbonate (OS-MORRIS) 600 MG tablet 1 tablet (600 mg total) by Per G Tube route 2 (two) times a day 10/1/24  Yes Brooke Mendelson, DO   carbidopa-levodopa (SINEMET CR)  mg TBCR per ER tablet TAKE 2 TABLETS BY MOUTH (50/200MG) TWICE A DAY (PARKINSONS DISEASE)  Patient taking differently: Take 2 tablets by mouth 2 (two) times a day Crushed for Via G tube 3/26/24  Yes Juan David Villar MD   cholecalciferol (VITAMIN D3) 1,000 units tablet 1 tablet (1,000 Units total) by Per G Tube route daily 8/18/24  Yes Aden Mcgregor MD   citalopram (CeleXA) 20 mg tablet 1 tablet (20 mg total) by Per G Tube route daily 10/2/24  Yes Brooke Mendelson, DO   Ferrous Sulfate 300 (60 Fe) mg/5 mL solution 5 mL (300 mg total) by Per G Tube route every other day  Patient taking differently: 300 mg by Per G Tube route every other day 10/2/24  Yes Brooke Mendelson, DO   insulin regular (HumuLIN R,NovoLIN R) 100 units/mL injection Inject 4 units subcutaneously at 6 PM and midnight while tube feeds are running 10/4/24  Yes LEATHA Siddiqi   insulin regular (HumuLIN R,NovoLIN R) 100 units/mL injection Inject 1-5 Units under the skin every 6 (six) hours if 0 - 179 = 0; 180 - 224 = 1 unit; 225 - 299 = 2 unit; 300 - 374 = 3 unit; 375 - 449 = 4 unit; 450+ = 5 unit.  Call MD for BS less than 60 or greater than 450 10/10/24  Yes Lila Dodd,    lansoprazole (PREVACID SOLUTAB) 15 mg disintegrating tablet 1 tablet (15 mg total)  by Per G Tube route daily Please dissolve in water. 10/1/24  Yes Brooke Mendelson, DO   levothyroxine 25 mcg tablet 1 tablet (25 mcg total) by Per G Tube route daily TAKE 1 TABLET BY MOUTH 1 HOUR BEFORE STARTING TUBE FEEDS (HYPOTHYROIDISM) 10/1/24  Yes Brooke Mendelson, DO   LORazepam (Ativan) 0.5 mg tablet 1 tablet (0.5 mg total) by Per G Tube route daily at bedtime for 10 days Hold for sedation 10/1/24 10/18/24 Yes Brooke Mendelson, DO   methenamine hippurate (HIPREX) 1 g tablet Crush 1 tablet two times a day per peg tube 10/1/24  Yes Brooke Mendelson, DO   midodrine (PROAMATINE) 5 mg tablet 1 tablet (5 mg total) by Per G Tube route 3 (three) times a day For hypotension, hold for systolic blood pressure greater than 110.  Patient taking differently: 5 mg by Per G Tube route 3 (three) times a day before meals For hypotension, hold for systolic blood pressure greater than 110. 10/1/24  Yes Brooke Mendelson, DO   oxybutynin (DITROPAN) 5 mg tablet 1 tablet (5 mg total) by Per G Tube route 3 (three) times a day 10/1/24  Yes Brooke Mendelson, DO   polyethylene glycol (GLYCOLAX) 17 GM/SCOOP powder 17 g by Per G Tube route daily  Patient taking differently: 17 g by Per G Tube route daily Hold one day for loose stools 8/18/24  Yes Aden Mcgregor MD   Refresh Liquigel 1 % GEL 2 (two) times a day 1 drop Bid bilaterally 8/13/23  Yes Kyle Brennan MD   simvastatin (ZOCOR) 20 mg tablet Daily at 4:00 PM. 10/10/24  Yes Lila Dodd DO   traZODone (DESYREL) 100 mg tablet 1 tablet (100 mg total) by Per G Tube route daily at bedtime  Patient taking differently: 100 mg by Per G Tube route daily at bedtime 10/1/24  Yes Brooke Mendelson, DO   valproic acid (DEPAKENE) 250 MG/5ML soln 2.5 mL (125 mg total) by Per G Tube route 2 (two) times a day  Patient taking differently: 125 mg by Per G Tube route 2 (two) times a day 10/1/24  Yes Brooke Mendelson, DO   Vitamins A & D (VITAMIN A & D) ointment Apply topically as  needed for skin irritation   Yes Historical Provider, MD   Alcohol Swabs 70 % PADS May substitute brand based on insurance coverage. Check glucose TID. 9/19/24   Yolanda Kay, DO   Blood Glucose Monitoring Suppl (OneTouch Verio Reflect) w/Device KIT May substitute brand based on insurance coverage. Check glucose TID.  Patient taking differently: QID and PRN 9/19/24   Yolanda Kay DO   calcium carbonate (OS-MORRIS) 600 MG tablet 1,500 mg  Patient not taking: Reported on 10/10/2024 9/25/24   Historical Provider, MD   Glucagon (Gvoke HypoPen 2-Pack) 0.5 MG/0.1ML SOAJ Inject 1 mg under the skin every 15 (fifteen) minutes as needed (for symptomatic blood glucose <60 and/or if patient is unconcious or seizing) Call 911 after use of pen and then notify Endocrinology provider 10/10/24   Lila Dodd,    glucose 40 % DISSOLVE IN WARM WATER and administer via peg tube as needed for asymptomatic for blood sugars less than 60 mg/dL and notify endocrine provider. Check blood glucose level in 15 minutes and repeat administration if blood glucose levels are less than 100. Repeat process until blood glucose level is above 100. 10/4/24   LEATHA Siddiqi   glucose blood (OneTouch Verio) test strip May substitute brand based on insurance coverage. Check glucose TID.  Patient taking differently: daily as needed QID and PRN 9/5/24   Silvana Mike DO   guaiFENesin (DIABETIC TUSSIN EX) 100 MG/5ML oral liquid 10 mL (200 mg total) by Per G Tube route 4 (four) times a day as needed for cough or congestion 10/1/24   Brooke Mendelson,    Gvoke HypoPen 2-Pack 1 MG/0.2ML SOAJ  10/11/24   Historical Provider, MD   hydrocortisone 1 % cream Apply topically to affected area twice daily as needed for rash 6/11/20   Chely Ny MD   Insulin Pen Needle (BD Pen Needle Yue 2nd Gen) 32G X 4 MM MISC For use with insulin pen. Pharmacy may dispense brand covered by insurance. 9/5/24   Silvana Mike DO    neomycin-bacitracin-polymyxin b (NEOSPORIN) ointment Apply 1 application topically 2 (two) times a day as needed Apply to affected area BID PRN    Historical Provider, MD   NovoLIN R FlexPen 100 UNIT/ML SOPN  10/1/24   Historical Provider, MD   olopatadine HCl (PATADAY) 0.2 % opth drops Administer 1 drop to both eyes as needed Instill 1 drop into affected eyes daily PRN for eye redness    Historical Provider, MD Bowden Delica Lancets 33G MISC May substitute brand based on insurance coverage. Check glucose TID. 9/19/24   Yolanda Kay,    omeprazole (PriLOSEC) 20 mg delayed release capsule Take 20 mg by mouth daily  Patient not taking: Reported on 10/4/2024 9/25/24 10/18/24  Historical Provider, MD     Allergies   Allergen Reactions    Ingrezza [Valbenazine Tosylate] Other (See Comments)     Behavioral changes per caregiver    Keflex [Cephalexin] GI Intolerance       Objective :  Temp:  [97.5 °F (36.4 °C)] 97.5 °F (36.4 °C)  HR:  [80-83] 80  BP: (148)/(72) 148/72  Resp:  [16] 16  SpO2:  [97 %] 97 %    Physical Exam  Vitals and nursing note reviewed.   Constitutional:       General: She is awake. She is not in acute distress.     Appearance: Normal appearance. She is underweight.   HENT:      Head: Normocephalic and atraumatic.      Mouth/Throat:      Mouth: Mucous membranes are dry.   Eyes:      Conjunctiva/sclera: Conjunctivae normal.   Cardiovascular:      Rate and Rhythm: Normal rate and regular rhythm.      Heart sounds: No murmur heard.  Pulmonary:      Effort: Pulmonary effort is normal. No respiratory distress.      Breath sounds: Normal breath sounds.   Abdominal:      Palpations: Abdomen is soft.      Tenderness: There is no abdominal tenderness.   Musculoskeletal:         General: No swelling.      Cervical back: Neck supple.      Right lower leg: No edema.      Left lower leg: No edema.   Skin:     General: Skin is warm and dry.      Capillary Refill: Capillary refill takes less than 2 seconds.    Neurological:      Mental Status: She is alert.   Psychiatric:         Mood and Affect: Mood normal.          Lines/Drains:            Lab Results: I have reviewed the following results:  Results from last 7 days   Lab Units 10/18/24  1957   WBC Thousand/uL 6.82   HEMOGLOBIN g/dL 10.4*   HEMATOCRIT % 32.4*   PLATELETS Thousands/uL 156   SEGS PCT % 68   LYMPHO PCT % 21   MONO PCT % 10   EOS PCT % 1     Results from last 7 days   Lab Units 10/18/24  1957   SODIUM mmol/L 144   POTASSIUM mmol/L 4.5   CHLORIDE mmol/L 99   CO2 mmol/L 36*   BUN mg/dL 70*   CREATININE mg/dL 2.86*   ANION GAP mmol/L 9   CALCIUM mg/dL 14.8*   ALBUMIN g/dL 4.3   TOTAL BILIRUBIN mg/dL 0.33   ALK PHOS U/L 54   ALT U/L 3*   AST U/L 4*   GLUCOSE RANDOM mg/dL 95             Lab Results   Component Value Date    HGBA1C 6.2 (H) 08/13/2024    HGBA1C 6.3 (H) 05/10/2024    HGBA1C 6.6 (H) 12/20/2023           Imaging Results Review: I personally reviewed the following image studies in PACS and associated radiology reports: chest xray. My interpretation of the radiology images/reports is: No acute cardiopulmonary abnormality.  Other Study Results Review: EKG was reviewed.     Administrative Statements       ** Please Note: This note has been constructed using a voice recognition system. **

## 2024-10-19 NOTE — ASSESSMENT & PLAN NOTE
Serum calcium 14.2 mg/dL on admission  Started on IV fluids overnight and calcitonin  Current calcium 12.4 mg/dL  Discontinue NS due to hyperchloremia and transition to Plasma-Lyte to avoid sodium load  Repeat calcitonin today 100 twice daily x 2  PTH pending, monitor ionized calcium

## 2024-10-20 LAB
ANION GAP SERPL CALCULATED.3IONS-SCNC: 7 MMOL/L (ref 4–13)
BUN SERPL-MCNC: 50 MG/DL (ref 5–25)
CA-I BLD-SCNC: 1.23 MMOL/L (ref 1.12–1.32)
CALCIUM SERPL-MCNC: 10.1 MG/DL (ref 8.4–10.2)
CHLORIDE SERPL-SCNC: 108 MMOL/L (ref 96–108)
CO2 SERPL-SCNC: 29 MMOL/L (ref 21–32)
CREAT SERPL-MCNC: 2.49 MG/DL (ref 0.6–1.3)
GFR SERPL CREATININE-BSD FRML MDRD: 20 ML/MIN/1.73SQ M
GLUCOSE SERPL-MCNC: 146 MG/DL (ref 65–140)
GLUCOSE SERPL-MCNC: 160 MG/DL (ref 65–140)
GLUCOSE SERPL-MCNC: 269 MG/DL (ref 65–140)
GLUCOSE SERPL-MCNC: 278 MG/DL (ref 65–140)
GLUCOSE SERPL-MCNC: 315 MG/DL (ref 65–140)
MAGNESIUM SERPL-MCNC: 2.1 MG/DL (ref 1.9–2.7)
MRSA NOSE QL CULT: NORMAL
PHOSPHATE SERPL-MCNC: 3.2 MG/DL (ref 2.3–4.1)
POTASSIUM SERPL-SCNC: 4.6 MMOL/L (ref 3.5–5.3)
SODIUM SERPL-SCNC: 144 MMOL/L (ref 135–147)

## 2024-10-20 PROCEDURE — 99233 SBSQ HOSP IP/OBS HIGH 50: CPT | Performed by: STUDENT IN AN ORGANIZED HEALTH CARE EDUCATION/TRAINING PROGRAM

## 2024-10-20 PROCEDURE — 80048 BASIC METABOLIC PNL TOTAL CA: CPT

## 2024-10-20 PROCEDURE — 82330 ASSAY OF CALCIUM: CPT | Performed by: STUDENT IN AN ORGANIZED HEALTH CARE EDUCATION/TRAINING PROGRAM

## 2024-10-20 PROCEDURE — 99232 SBSQ HOSP IP/OBS MODERATE 35: CPT | Performed by: STUDENT IN AN ORGANIZED HEALTH CARE EDUCATION/TRAINING PROGRAM

## 2024-10-20 PROCEDURE — 83735 ASSAY OF MAGNESIUM: CPT

## 2024-10-20 PROCEDURE — 82948 REAGENT STRIP/BLOOD GLUCOSE: CPT

## 2024-10-20 PROCEDURE — 84100 ASSAY OF PHOSPHORUS: CPT

## 2024-10-20 RX ORDER — INSULIN GLARGINE 100 [IU]/ML
10 INJECTION, SOLUTION SUBCUTANEOUS EVERY MORNING
Status: DISCONTINUED | OUTPATIENT
Start: 2024-10-20 | End: 2024-10-21

## 2024-10-20 RX ORDER — ACETAMINOPHEN 325 MG/1
650 TABLET ORAL EVERY 6 HOURS PRN
Status: DISCONTINUED | OUTPATIENT
Start: 2024-10-20 | End: 2024-10-24

## 2024-10-20 RX ADMIN — HEPARIN SODIUM 5000 UNITS: 5000 INJECTION INTRAVENOUS; SUBCUTANEOUS at 06:44

## 2024-10-20 RX ADMIN — OXYBUTYNIN CHLORIDE 5 MG: 5 TABLET ORAL at 22:01

## 2024-10-20 RX ADMIN — INSULIN LISPRO 2 UNITS: 100 INJECTION, SOLUTION INTRAVENOUS; SUBCUTANEOUS at 06:44

## 2024-10-20 RX ADMIN — ACETAMINOPHEN 650 MG: 325 TABLET ORAL at 01:07

## 2024-10-20 RX ADMIN — PRAVASTATIN SODIUM 40 MG: 40 TABLET ORAL at 17:43

## 2024-10-20 RX ADMIN — VALPROIC ACID 125 MG: 250 SOLUTION ORAL at 09:04

## 2024-10-20 RX ADMIN — OXYCODONE HYDROCHLORIDE AND ACETAMINOPHEN 1000 MG: 500 TABLET ORAL at 09:05

## 2024-10-20 RX ADMIN — CALCITONIN SALMON 100 UNITS: 200 INJECTION, SOLUTION INTRAMUSCULAR; SUBCUTANEOUS at 04:31

## 2024-10-20 RX ADMIN — OXYBUTYNIN CHLORIDE 5 MG: 5 TABLET ORAL at 17:43

## 2024-10-20 RX ADMIN — VALPROIC ACID 125 MG: 250 SOLUTION ORAL at 22:01

## 2024-10-20 RX ADMIN — PANTOPRAZOLE SODIUM 20 MG: 40 INJECTION, POWDER, FOR SOLUTION INTRAVENOUS at 09:05

## 2024-10-20 RX ADMIN — CARBIDOPA AND LEVODOPA 2 TABLET: 25; 100 TABLET, EXTENDED RELEASE ORAL at 17:43

## 2024-10-20 RX ADMIN — CARBIDOPA AND LEVODOPA 2 TABLET: 25; 100 TABLET, EXTENDED RELEASE ORAL at 09:05

## 2024-10-20 RX ADMIN — INSULIN GLARGINE 10 UNITS: 100 INJECTION, SOLUTION SUBCUTANEOUS at 09:04

## 2024-10-20 RX ADMIN — CITALOPRAM HYDROBROMIDE 20 MG: 20 TABLET, FILM COATED ORAL at 09:05

## 2024-10-20 RX ADMIN — HEPARIN SODIUM 5000 UNITS: 5000 INJECTION INTRAVENOUS; SUBCUTANEOUS at 22:01

## 2024-10-20 RX ADMIN — OXYBUTYNIN CHLORIDE 5 MG: 5 TABLET ORAL at 09:05

## 2024-10-20 RX ADMIN — INSULIN LISPRO 1 UNITS: 100 INJECTION, SOLUTION INTRAVENOUS; SUBCUTANEOUS at 12:59

## 2024-10-20 RX ADMIN — ARIPIPRAZOLE 2 MG: 2 TABLET ORAL at 22:02

## 2024-10-20 RX ADMIN — INSULIN LISPRO 3 UNITS: 100 INJECTION, SOLUTION INTRAVENOUS; SUBCUTANEOUS at 01:07

## 2024-10-20 RX ADMIN — LEVOTHYROXINE SODIUM 25 MCG: 25 TABLET ORAL at 06:44

## 2024-10-20 RX ADMIN — TRAZODONE HYDROCHLORIDE 100 MG: 100 TABLET ORAL at 22:01

## 2024-10-20 RX ADMIN — HEPARIN SODIUM 5000 UNITS: 5000 INJECTION INTRAVENOUS; SUBCUTANEOUS at 13:27

## 2024-10-20 NOTE — ASSESSMENT & PLAN NOTE
Lab Results   Component Value Date    HGBA1C 6.2 (H) 08/13/2024       Recent Labs     10/19/24  1241 10/19/24  1811 10/20/24  0050 10/20/24  0639   POCGLU 241* 236* 315* 269*       Blood Sugar Average: Last 72 hrs:  (P) 224Hold home regimen while inpatient and resume on discharge NovoLog 4 units at 6 PM and midnight while tube feeds are running and sliding scale  Goal -180 while admitted, adjusting insulin regimen as appropriate  Glucose levels noted to be elevated.  Continue sliding scale.  Add Lantus 10 units every morning.

## 2024-10-20 NOTE — ASSESSMENT & PLAN NOTE
Recent Labs     10/18/24  1518 10/18/24  1957 10/19/24  0436   HGB 11.0* 10.4* 9.4*   Baseline: 9-11, currently hemoglobin is baseline  Ordered iron studies  Daily CBC  Continue PTA ferrous sulfate  Transfuse if hemoglobin less than 7

## 2024-10-20 NOTE — PROGRESS NOTES
Progress Note - Hospitalist   Name: Terrie Alicea 64 y.o. female I MRN: [id number]  Unit/Bed#: S -01 I Date of Admission: 10/18/2024   Date of Service: 10/20/2024 I Hospital Day: 2    Assessment & Plan  Hypercalcemia  Recent Labs     10/18/24  1518 10/18/24  1959 10/19/24  0037 10/19/24  0436 10/20/24  0532   CALCIUM 14.2* 14.8* 13.5* 12.4* 10.1   ALB 4.4 4.3  --  3.7  --      Presented with abnormal lab work showing hypercalcemia of 14.2, repeat CMP showed calcium of 14.8  Per care nurse her mental status was abnormal yesterday but it improved prior to coming to the ER  Corrected calcium: 14.6, iCal: 1.68, TSH: 0.8  Nephrology was reach out to in the ED recommended calcitonin  Etiology: Unclear right now, possibly from severe dehydration/possible malignancy considering multiple intermediate nodular densities in right breast(reflecting trauma/fat necrosis) in CT scan in 9/2024    Plan:  Ordered test dose of calcitonin no allergic response to it, placed order for calcitonin 4 units/kg every 12 as per pharmacy for 24 hours to prevent tachyphylaxis  Follow-up PTH/vitamin D/PTHrp  Consulted nephrology  And calcium pending, continue with IV fluids per nephrology recommendations  SCAR (acute kidney injury) (HCC)  Recent Labs     10/18/24  1959 10/19/24  0436 10/20/24  0532   CREATININE 2.86* 2.62* 2.49*   EGFR 16 18 20     Estimated Creatinine Clearance: 14.3 mL/min (A) (by C-G formula based on SCr of 2.49 mg/dL (H)).    POA 2.83; (baseline 0.8-1)  Likely prerenal due to decreased p.o. intake(was not getting adequate fluid from tube feed, per care nurse was getting 1700 mL a day)    Plan:  IV Fluids isotonic saline at 100 mL/h  BMP from today pending  Cerebral palsy (HCC)  At baseline is non verbal , totally dependent on care, resides in intermediate care facility  Will consider resuming baclofen today based on mentation throughout day  Bipolar disorder (HCC)  Continue PTA Depakote/Abilify  Diabetes mellitus type  2, insulin dependent (HCC)  Lab Results   Component Value Date    HGBA1C 6.2 (H) 08/13/2024       Recent Labs     10/19/24  1241 10/19/24  1811 10/20/24  0050 10/20/24  0639   POCGLU 241* 236* 315* 269*       Blood Sugar Average: Last 72 hrs:  (P) 224Hold home regimen while inpatient and resume on discharge NovoLog 4 units at 6 PM and midnight while tube feeds are running and sliding scale  Goal -180 while admitted, adjusting insulin regimen as appropriate  Glucose levels noted to be elevated.  Continue sliding scale.  Add Lantus 10 units every morning.  Iron deficiency anemia  Recent Labs     10/18/24  1518 10/18/24  1959 10/19/24  0436   HGB 11.0* 10.4* 9.4*   Baseline: 9-11, currently hemoglobin is baseline  Ordered iron studies  Daily CBC  Continue PTA ferrous sulfate  Transfuse if hemoglobin less than 7  Hypothyroidism  Continue PTA levothyroxine  TSH and T4 within normal limits  Metabolic alkalosis      VTE Pharmacologic Prophylaxis: VTE Score: 4 Moderate Risk (Score 3-4) - Pharmacological DVT Prophylaxis Ordered: heparin.    Mobility:   Basic Mobility Inpatient Raw Score: 6  JH-HLM Goal: 2: Bed activities/Dependent transfer  JH-HLM Achieved: 2: Bed activities/Dependent transfer  JH-HLM Goal achieved. Continue to encourage appropriate mobility.    Patient Centered Rounds: I performed bedside rounds with nursing staff today.   Discussions with Specialists or Other Care Team Provider: discussed with nephrology    Education and Discussions with Family / Patient: Devendra Gonzalez    Current Length of Stay: 2 day(s)  Current Patient Status: Inpatient   Certification Statement: The patient will continue to require additional inpatient hospital stay due to hypercalcemia  Discharge Plan: Anticipate discharge in 48-72 hrs to rehab facility.    Code Status: Level 1 - Full Code    Subjective   Patient seen examined at bedside.  Appears to be more alert this morning.  Mouth is dry with contracted  extremities    Objective :  Temp:  [98.5 °F (36.9 °C)-100 °F (37.8 °C)] 100 °F (37.8 °C)  HR:  [81-95] 84  BP: ()/() 111/61  Resp:  [16] 16  SpO2:  [96 %-98 %] 96 %    There is no height or weight on file to calculate BMI.     Input and Output Summary (last 24 hours):     Intake/Output Summary (Last 24 hours) at 10/20/2024 0719  Last data filed at 10/19/2024 2242  Gross per 24 hour   Intake 1075 ml   Output 397 ml   Net 678 ml       Physical Exam  Vitals and nursing note reviewed.   Constitutional:       General: She is not in acute distress.     Appearance: She is well-developed. She is ill-appearing.   HENT:      Head: Normocephalic and atraumatic.   Eyes:      Conjunctiva/sclera: Conjunctivae normal.   Cardiovascular:      Rate and Rhythm: Normal rate and regular rhythm.      Heart sounds: No murmur heard.  Pulmonary:      Effort: Pulmonary effort is normal. No respiratory distress.      Breath sounds: Normal breath sounds.   Abdominal:      Palpations: Abdomen is soft.      Tenderness: There is no abdominal tenderness.   Musculoskeletal:         General: No swelling.      Cervical back: Neck supple.   Skin:     General: Skin is warm and dry.   Neurological:      Mental Status: She is alert. Mental status is at baseline.      Comments: Contracted upper and lower extremities   Psychiatric:         Mood and Affect: Mood normal.           Lines/Drains:                       Lab Results: I have reviewed the following results:   Results from last 7 days   Lab Units 10/19/24  0436 10/18/24  1959   WBC Thousand/uL 6.25 6.82   HEMOGLOBIN g/dL 9.4* 10.4*   HEMATOCRIT % 29.9* 32.4*   PLATELETS Thousands/uL 145* 156   SEGS PCT %  --  68   LYMPHO PCT %  --  21   MONO PCT %  --  10   EOS PCT %  --  1     Results from last 7 days   Lab Units 10/20/24  0532 10/19/24  0436   SODIUM mmol/L 144 145   POTASSIUM mmol/L 4.6 4.3   CHLORIDE mmol/L 108 109*   CO2 mmol/L 29 28   BUN mg/dL 50* 61*   CREATININE mg/dL 2.49*  2.62*   ANION GAP mmol/L 7 8   CALCIUM mg/dL 10.1 12.4*   ALBUMIN g/dL  --  3.7   TOTAL BILIRUBIN mg/dL  --  0.36   ALK PHOS U/L  --  47   ALT U/L  --  <3*   AST U/L  --  3*   GLUCOSE RANDOM mg/dL 278* 146*         Results from last 7 days   Lab Units 10/20/24  0639 10/20/24  0050 10/19/24  1811 10/19/24  1241 10/19/24  0607 10/19/24  0116   POC GLUCOSE mg/dl 269* 315* 236* 241* 155* 128               Recent Cultures (last 7 days):         Imaging Results Review: I reviewed radiology reports from this admission including: Ultrasound(s).  Other Study Results Review: EKG was reviewed.     Last 24 Hours Medication List:     Current Facility-Administered Medications:     ARIPiprazole (ABILIFY) tablet 2 mg, HS    ascorbic acid (VITAMIN C) tablet 1,000 mg, Daily    carbidopa-levodopa (SINEMET CR)  mg per ER tablet 2 tablet, BID    citalopram (CeleXA) tablet 20 mg, Daily    Ferrous Sulfate oral syrup 300 mg, Every Other Day    heparin (porcine) subcutaneous injection 5,000 Units, Q8H [facility]    influenza vaccine, recombinant (PF) (Flublok) IM injection 0.5 mL, Once    insulin glargine (LANTUS) subcutaneous injection 10 Units 0.1 mL, QAM    insulin lispro (HumALOG/ADMELOG) 100 units/mL subcutaneous injection 1-5 Units, Q6H [facility] **AND** Fingerstick Glucose (POCT), Q6H    Ketotifen Fumarate (ZADITOR) 0.035 % ophthalmic solution 1 drop, BID PRN    levothyroxine tablet 25 mcg, Early Morning    midodrine (PROAMATINE) tablet 5 mg, TID PRN    multi-electrolyte (PLASMALYTE-A/ISOLYTE-S PH 7.4) IV solution, Continuous, Last Rate: 75 mL/hr (10/19/24 2242)    oxybutynin (DITROPAN) tablet 5 mg, TID    pantoprazole (PROTONIX) injection 20 mg, Q24H [facility]    polyethylene glycol (MIRALAX) packet 17 g, Daily PRN    pravastatin (PRAVACHOL) tablet 40 mg, Daily With Dinner    sodium chloride 0.9 % bolus 1,000 mL, Once    traZODone (DESYREL) tablet 100 mg, HS    valproic acid (DEPAKENE) oral soln 125 mg, BID    Administrative Statements   Today,  Patient Was Seen By: Nicolas Lopez, DO  I have spent a total time of 20 minutes in caring for this patient on the day of the visit/encounter including Diagnostic results, Prognosis, Risks and benefits of tx options, Documenting in the medical record, Reviewing / ordering tests, medicine, procedures  , Obtaining or reviewing history  , and Communicating with other healthcare professionals .    **Please Note: This note may have been constructed using a voice recognition system.**

## 2024-10-20 NOTE — ASSESSMENT & PLAN NOTE
serum HCO3 28 mmol/L 29  metabolic alkalosis likely secondary to poor fluid intake   Increase free water flush as above

## 2024-10-20 NOTE — ASSESSMENT & PLAN NOTE
#Non-Oliguric KDIGO SCAR stage 3 severe   Etiology: Likely secondary to vasoconstriction and hemodynamic changes in the settings of hyperkalemia  Baseline creatinine 0.8 mg/dL  Peak creatinine: 2.8 mg/dL  Current creatinine: 2.49 mg/dL, improving  UA: No hematuria  Renal imaging : No hydronephrosis  Treatment:  No indication of dialysis  Maintain MAP:  Over 65 mmHg if possible/avoid hypoperfusion:  Hold parameters on blood pressure medications  Avoid nephrotoxic agents such as NSAIDs, and IV contrast if possible. Avoid opioids   Adjust medications to GFR

## 2024-10-20 NOTE — PROGRESS NOTES
Progress Note - Nephrology   Name: Terrie Alicea 64 y.o. female I MRN: [id number]  Unit/Bed#: S -01 I Date of Admission: 10/18/2024   Date of Service: 10/20/2024 I Hospital Day: 2     Assessment & Plan  Hypercalcemia  Serum calcium 14.2 mg/dL on admission  PTH appropriately suppressed  Etiology: Likely secondary to immobilization with calcium supplements  Current calcium 10.1 mg/dL, improved  Status post IV fluids and calcitonin  Treatment  discontinue Plasma-Lyte  Increase free water flush to 200ml     SCAR (acute kidney injury) (HCC)  #Non-Oliguric KDIGO SCAR stage 3 severe   Etiology: Likely secondary to vasoconstriction and hemodynamic changes in the settings of hyperkalemia  Baseline creatinine 0.8 mg/dL  Peak creatinine: 2.8 mg/dL  Current creatinine: 2.49 mg/dL, improving  UA: No hematuria  Renal imaging : No hydronephrosis  Treatment:  No indication of dialysis  Maintain MAP:  Over 65 mmHg if possible/avoid hypoperfusion:  Hold parameters on blood pressure medications  Avoid nephrotoxic agents such as NSAIDs, and IV contrast if possible. Avoid opioids   Adjust medications to GFR    Cerebral palsy (HCC)  Immobilization complicated with hypercalcemia  IV fluids as above  Diabetes mellitus type 2, insulin dependent (HCC)  Lab Results   Component Value Date    HGBA1C 6.2 (H) 08/13/2024       Recent Labs     10/19/24  0607 10/19/24  1241 10/19/24  1811 10/20/24  0050   POCGLU 155* 241* 236* 315*       Blood Sugar Average: Last 72 hrs:  (P) 215  HbA1c 6.2  Advised to maintain a good DM control   Maintain healthy diet   Management as per primary team  Iron deficiency anemia  Hemoglobin 9.4 mg/dL  Management as per primary team  Metabolic alkalosis  serum HCO3 28 mmol/L 29  metabolic alkalosis likely secondary to poor fluid intake   Increase free water flush as above    I have reviewed the nephrology recommendations including discontinue IV fluids and increase free water flush, with primary team, and we are in  agreement with renal plan including the information outlined above. I have discussed the above management plan in detail with the primary service.     Subjective   Brief History of Admission - 64 y.o. woman  with PMH of diabetes, Parkinson disease, hyperlipidemia p/w hypercalcemia and SCAR.  Nephrology is consulted for management of SCAR.    Calcium improving, no events overnight    Objective :  Temp:  [98.5 °F (36.9 °C)-100 °F (37.8 °C)] 100 °F (37.8 °C)  HR:  [81-95] 84  BP: ()/() 111/61  Resp:  [16] 16  SpO2:  [96 %-98 %] 96 %    Current Weight:    First Weight:    I/O         10/18 0701  10/19 0700 10/19 0701  10/20 0700    I.V.  1075    IV Piggyback 1000     Total Intake 1000 1075    Urine  397    Total Output  397    Net +1000 +678                Physical Exam  General:  no acute distress at this time, cachectic  Skin:  No acute rash  Eyes:  No scleral icterus and noninjected  ENT:  mucous membranes moist  Neck:  no carotid bruits  Chest:  Clear to auscultation percussion, good respiratory effort, no use of accessory respiratory muscles  CVS:  Regular rate and rhythm without rub   Abdomen:  soft and nontender   Extremities: no significant lower extremity edema  Neuro: Cerebral palsy  Psych:  AMS    Medications:    Current Facility-Administered Medications:     ARIPiprazole (ABILIFY) tablet 2 mg, 2 mg, Per G Tube, HS, Leonidas Sharma MD, 2 mg at 10/19/24 2242    ascorbic acid (VITAMIN C) tablet 1,000 mg, 1,000 mg, Per G Tube, Daily, Leonidas Sharma MD, 1,000 mg at 10/19/24 0844    carbidopa-levodopa (SINEMET CR)  mg per ER tablet 2 tablet, 2 tablet, Oral, BID, Leonidas Sharma MD, 2 tablet at 10/19/24 1731    citalopram (CeleXA) tablet 20 mg, 20 mg, Per G Tube, Daily, Leonidas Sharma MD, 20 mg at 10/19/24 0845    Ferrous Sulfate oral syrup 300 mg, 300 mg, Per G Tube, Every Other Day, Leonidas Shamra MD, 300 mg at 10/19/24 0844    heparin (porcine) subcutaneous injection 5,000 Units, 5,000 Units,  Subcutaneous, Q8H [facility], Leonidas Sharma MD, 5,000 Units at 10/19/24 2242    influenza vaccine, recombinant (PF) (Flublok) IM injection 0.5 mL, 0.5 mL, Intramuscular, Once, Nicolas Lopez DO    insulin lispro (HumALOG/ADMELOG) 100 units/mL subcutaneous injection 1-5 Units, 1-5 Units, Subcutaneous, Q6H [facility], 3 Units at 10/20/24 0107 **AND** Fingerstick Glucose (POCT), , , Q6H, Leonidas Sharma MD    Ketotifen Fumarate (ZADITOR) 0.035 % ophthalmic solution 1 drop, 1 drop, Both Eyes, BID PRN, Leonidas Sharma MD    levothyroxine tablet 25 mcg, 25 mcg, Per G Tube, Early Morning, Leonidas Sharma MD, 25 mcg at 10/19/24 0609    midodrine (PROAMATINE) tablet 5 mg, 5 mg, Per G Tube, TID PRN, Leonidas Sharma MD    multi-electrolyte (PLASMALYTE-A/ISOLYTE-S PH 7.4) IV solution, 75 mL/hr, Intravenous, Continuous, Joselyn Reyes Bahamonde, MD, Last Rate: 75 mL/hr at 10/19/24 2242, 75 mL/hr at 10/19/24 2242    oxybutynin (DITROPAN) tablet 5 mg, 5 mg, Per G Tube, TID, Leonidas Sharma MD, 5 mg at 10/19/24 2242    pantoprazole (PROTONIX) injection 20 mg, 20 mg, Intravenous, Q24H [facility], Leonidas Sharma MD, 20 mg at 10/19/24 0844    polyethylene glycol (MIRALAX) packet 17 g, 17 g, Per G Tube, Daily PRN, Leonidas Sharma MD    pravastatin (PRAVACHOL) tablet 40 mg, 40 mg, Per G Tube, Daily With Dinner, Leonidas Sharma MD, 40 mg at 10/19/24 1731    sodium chloride 0.9 % bolus 1,000 mL, 1,000 mL, Intravenous, Once, Leonidas Sharma MD    traZODone (DESYREL) tablet 100 mg, 100 mg, Per G Tube, HS, Leonidas Sharma MD, 100 mg at 10/19/24 2242    valproic acid (DEPAKENE) oral soln 125 mg, 125 mg, Per G Tube, BID, Leonidas Sharma MD, 125 mg at 10/19/24 2242      Lab Results: I have reviewed the following results:  Results from last 7 days   Lab Units 10/19/24  0436 10/19/24  0037 10/18/24  1959 10/18/24  1518   WBC Thousand/uL 6.25  --  6.82 6.90   HEMOGLOBIN g/dL 9.4*  --  10.4* 11.0*   HEMATOCRIT % 29.9*  --  32.4* 34.4*   PLATELETS Thousands/uL 145*  --  156 173   POTASSIUM  "mmol/L 4.3  --  4.5 4.6   CHLORIDE mmol/L 109*  --  99 98   CO2 mmol/L 28  --  36* 36*   BUN mg/dL 61*  --  70* 72*   CREATININE mg/dL 2.62*  --  2.86* 2.83*   CALCIUM mg/dL 12.4* 13.5* 14.8* 14.2*   ALBUMIN g/dL 3.7  --  4.3 4.4       Administrative Statements     Portions of the record may have been created with voice recognition software. Occasional wrong word or \"sound a like\" substitutions may have occurred due to the inherent limitations of voice recognition software. Read the chart carefully and recognize, using context, where substitutions have occurred.If you have any questions, please contact the dictating provider.  "

## 2024-10-20 NOTE — ASSESSMENT & PLAN NOTE
Lab Results   Component Value Date    HGBA1C 6.2 (H) 08/13/2024       Recent Labs     10/19/24  0607 10/19/24  1241 10/19/24  1811 10/20/24  0050   POCGLU 155* 241* 236* 315*       Blood Sugar Average: Last 72 hrs:  (P) 215  HbA1c 6.2  Advised to maintain a good DM control   Maintain healthy diet   Management as per primary team

## 2024-10-20 NOTE — CASE MANAGEMENT
Case Management Progress Note    Patient name Terrie Alicea  Location S /S -01 MRN [id number]  : 1960 Date 10/20/2024       LOS (days): 2  Geometric Mean LOS (GMLOS) (days):   Days to GMLOS:        OBJECTIVE:        Current admission status: Inpatient  Preferred Pharmacy:   [facility] [place] - [address]  [address]  [address]  [place] [address]  Phone: [phone] Fax: [phone]    [facility]  [address]  [place] [address]  Phone: [phone] Fax: [phone]    [facility] - [place], [place] - [address]  [address]  [facility] [address]  Phone: [phone] Fax: [phone]    [facility] - [place] - [address]  [address]  [place] [address]  Phone: [phone] Fax: [phone]    [place], [place] - [address] [facility]  [address] [place] [address]  Phone: [phone] Fax: [phone]    Primary Care Provider: Gunnar Sylvester DO    Primary Insurance: [facility] [employer] [facility]  Secondary Insurance: [place] [employer]    PROGRESS NOTE:     made aware patient cleared for discharge. Call made to [facility] at [phone] and was directed to call Lisa @ [phone]. Call made to Lisa to discuss discharge. Lisa stating that they are unable to admit patient to facility today due to no clinical staff on-site at [facility] to do an admission. Lisa stating that she needs to s/w provider before she is able to coordinate a return to facility and that earliest availability is tomorrow. Provider made aware of request for phone call with medical update and availability for patient to return to  tomorrow.

## 2024-10-20 NOTE — ASSESSMENT & PLAN NOTE
Recent Labs     10/18/24  1518 10/18/24  1959 10/19/24  0037 10/19/24  0436 10/20/24  0532   CALCIUM 14.2* 14.8* 13.5* 12.4* 10.1   ALB 4.4 4.3  --  3.7  --      Presented with abnormal lab work showing hypercalcemia of 14.2, repeat CMP showed calcium of 14.8  Per care nurse her mental status was abnormal yesterday but it improved prior to coming to the ER  Corrected calcium: 14.6, iCal: 1.68, TSH: 0.8  Nephrology was reach out to in the ED recommended calcitonin  Etiology: Unclear right now, possibly from severe dehydration/possible malignancy considering multiple intermediate nodular densities in right breast(reflecting trauma/fat necrosis) in CT scan in 9/2024    Plan:  Ordered test dose of calcitonin no allergic response to it, placed order for calcitonin 4 units/kg every 12 as per pharmacy for 24 hours to prevent tachyphylaxis  Follow-up PTH/vitamin D/PTHrp  Consulted nephrology  And calcium pending, continue with IV fluids per nephrology recommendations

## 2024-10-20 NOTE — ASSESSMENT & PLAN NOTE
Recent Labs     10/18/24  1959 10/19/24  0436 10/20/24  0532   CREATININE 2.86* 2.62* 2.49*   EGFR 16 18 20     Estimated Creatinine Clearance: 14.3 mL/min (A) (by C-G formula based on SCr of 2.49 mg/dL (H)).    POA 2.83; (baseline 0.8-1)  Likely prerenal due to decreased p.o. intake(was not getting adequate fluid from tube feed, per care nurse was getting 1700 mL a day)    Plan:  IV Fluids isotonic saline at 100 mL/h  BMP from today pending

## 2024-10-20 NOTE — ASSESSMENT & PLAN NOTE
At baseline is non verbal , totally dependent on care, resides in intermediate care facility  Will consider resuming baclofen today based on mentation throughout day

## 2024-10-20 NOTE — ASSESSMENT & PLAN NOTE
Serum calcium 14.2 mg/dL on admission  PTH appropriately suppressed  Etiology: Likely secondary to immobilization with calcium supplements  Current calcium 10.1 mg/dL, improved  Status post IV fluids and calcitonin  Treatment  discontinue Plasma-Lyte  Increase free water flush to 200ml

## 2024-10-21 ENCOUNTER — APPOINTMENT (INPATIENT)
Dept: RADIOLOGY | Facility: HOSPITAL | Age: 64
DRG: 640 | End: 2024-10-21
Payer: MEDICARE

## 2024-10-21 LAB
ALBUMIN SERPL BCG-MCNC: 3.7 G/DL (ref 3.5–5)
ANION GAP SERPL CALCULATED.3IONS-SCNC: 8 MMOL/L (ref 4–13)
ATRIAL RATE: 94 BPM
BUN SERPL-MCNC: 52 MG/DL (ref 5–25)
CA-I BLD-SCNC: 1.26 MMOL/L (ref 1.12–1.32)
CALCIUM SERPL-MCNC: 9.6 MG/DL (ref 8.4–10.2)
CHLORIDE SERPL-SCNC: 108 MMOL/L (ref 96–108)
CO2 SERPL-SCNC: 27 MMOL/L (ref 21–32)
CREAT SERPL-MCNC: 2.26 MG/DL (ref 0.6–1.3)
DME PARACHUTE DELIVERY DATE REQUESTED: NORMAL
DME PARACHUTE ITEM DESCRIPTION: NORMAL
DME PARACHUTE ITEM DESCRIPTION: NORMAL
DME PARACHUTE ORDER STATUS: NORMAL
DME PARACHUTE SUPPLIER NAME: NORMAL
DME PARACHUTE SUPPLIER PHONE: NORMAL
ERYTHROCYTE [DISTWIDTH] IN BLOOD BY AUTOMATED COUNT: 12.7 % (ref 11.6–15.1)
GFR SERPL CREATININE-BSD FRML MDRD: 22 ML/MIN/1.73SQ M
GLUCOSE SERPL-MCNC: 144 MG/DL (ref 65–140)
GLUCOSE SERPL-MCNC: 270 MG/DL (ref 65–140)
GLUCOSE SERPL-MCNC: 272 MG/DL (ref 65–140)
GLUCOSE SERPL-MCNC: 331 MG/DL (ref 65–140)
GLUCOSE SERPL-MCNC: 340 MG/DL (ref 65–140)
HCT VFR BLD AUTO: 29.1 % (ref 34.8–46.1)
HGB BLD-MCNC: 9.1 G/DL (ref 11.5–15.4)
MAGNESIUM SERPL-MCNC: 1.9 MG/DL (ref 1.9–2.7)
MCH RBC QN AUTO: 33.1 PG (ref 26.8–34.3)
MCHC RBC AUTO-ENTMCNC: 31.3 G/DL (ref 31.4–37.4)
MCV RBC AUTO: 106 FL (ref 82–98)
P AXIS: 11 DEGREES
PHOSPHATE SERPL-MCNC: 2.5 MG/DL (ref 2.3–4.1)
PLATELET # BLD AUTO: 127 THOUSANDS/UL (ref 149–390)
PMV BLD AUTO: 13.4 FL (ref 8.9–12.7)
POTASSIUM SERPL-SCNC: 4.6 MMOL/L (ref 3.5–5.3)
PR INTERVAL: 140 MS
QRS AXIS: 74 DEGREES
QRSD INTERVAL: 88 MS
QT INTERVAL: 398 MS
QTC INTERVAL: 400 MS
RBC # BLD AUTO: 2.75 MILLION/UL (ref 3.81–5.12)
SODIUM SERPL-SCNC: 143 MMOL/L (ref 135–147)
T WAVE AXIS: 55 DEGREES
VENTRICULAR RATE: 61 BPM
WBC # BLD AUTO: 5.82 THOUSAND/UL (ref 4.31–10.16)

## 2024-10-21 PROCEDURE — 82330 ASSAY OF CALCIUM: CPT | Performed by: STUDENT IN AN ORGANIZED HEALTH CARE EDUCATION/TRAINING PROGRAM

## 2024-10-21 PROCEDURE — 82948 REAGENT STRIP/BLOOD GLUCOSE: CPT

## 2024-10-21 PROCEDURE — 85027 COMPLETE CBC AUTOMATED: CPT | Performed by: STUDENT IN AN ORGANIZED HEALTH CARE EDUCATION/TRAINING PROGRAM

## 2024-10-21 PROCEDURE — 93010 ELECTROCARDIOGRAM REPORT: CPT | Performed by: INTERNAL MEDICINE

## 2024-10-21 PROCEDURE — 99233 SBSQ HOSP IP/OBS HIGH 50: CPT | Performed by: INTERNAL MEDICINE

## 2024-10-21 PROCEDURE — 83735 ASSAY OF MAGNESIUM: CPT | Performed by: STUDENT IN AN ORGANIZED HEALTH CARE EDUCATION/TRAINING PROGRAM

## 2024-10-21 PROCEDURE — 71045 X-RAY EXAM CHEST 1 VIEW: CPT

## 2024-10-21 PROCEDURE — 99232 SBSQ HOSP IP/OBS MODERATE 35: CPT | Performed by: STUDENT IN AN ORGANIZED HEALTH CARE EDUCATION/TRAINING PROGRAM

## 2024-10-21 PROCEDURE — 80069 RENAL FUNCTION PANEL: CPT | Performed by: STUDENT IN AN ORGANIZED HEALTH CARE EDUCATION/TRAINING PROGRAM

## 2024-10-21 RX ORDER — INSULIN GLARGINE 100 [IU]/ML
10 INJECTION, SOLUTION SUBCUTANEOUS EVERY 12 HOURS SCHEDULED
Status: DISCONTINUED | OUTPATIENT
Start: 2024-10-21 | End: 2024-10-22

## 2024-10-21 RX ORDER — BACLOFEN 10 MG/1
10 TABLET ORAL 3 TIMES DAILY
Status: DISCONTINUED | OUTPATIENT
Start: 2024-10-21 | End: 2024-10-22

## 2024-10-21 RX ADMIN — CARBIDOPA AND LEVODOPA 2 TABLET: 25; 100 TABLET, EXTENDED RELEASE ORAL at 10:42

## 2024-10-21 RX ADMIN — INSULIN GLARGINE 10 UNITS: 100 INJECTION, SOLUTION SUBCUTANEOUS at 10:43

## 2024-10-21 RX ADMIN — INSULIN LISPRO 3 UNITS: 100 INJECTION, SOLUTION INTRAVENOUS; SUBCUTANEOUS at 06:38

## 2024-10-21 RX ADMIN — INSULIN LISPRO 2 UNITS: 100 INJECTION, SOLUTION INTRAVENOUS; SUBCUTANEOUS at 17:54

## 2024-10-21 RX ADMIN — FERROUS SULFATE 300 MG: 300 SOLUTION ORAL at 10:42

## 2024-10-21 RX ADMIN — PANTOPRAZOLE SODIUM 20 MG: 40 INJECTION, POWDER, FOR SOLUTION INTRAVENOUS at 10:43

## 2024-10-21 RX ADMIN — OXYBUTYNIN CHLORIDE 5 MG: 5 TABLET ORAL at 17:23

## 2024-10-21 RX ADMIN — OXYBUTYNIN CHLORIDE 5 MG: 5 TABLET ORAL at 23:12

## 2024-10-21 RX ADMIN — VALPROIC ACID 125 MG: 250 SOLUTION ORAL at 10:42

## 2024-10-21 RX ADMIN — PRAVASTATIN SODIUM 40 MG: 40 TABLET ORAL at 17:23

## 2024-10-21 RX ADMIN — HEPARIN SODIUM 5000 UNITS: 5000 INJECTION INTRAVENOUS; SUBCUTANEOUS at 23:36

## 2024-10-21 RX ADMIN — OXYCODONE HYDROCHLORIDE AND ACETAMINOPHEN 1000 MG: 500 TABLET ORAL at 10:42

## 2024-10-21 RX ADMIN — CITALOPRAM HYDROBROMIDE 20 MG: 20 TABLET, FILM COATED ORAL at 10:58

## 2024-10-21 RX ADMIN — BACLOFEN 10 MG: 10 TABLET ORAL at 10:42

## 2024-10-21 RX ADMIN — ARIPIPRAZOLE 2 MG: 2 TABLET ORAL at 23:14

## 2024-10-21 RX ADMIN — BACLOFEN 10 MG: 10 TABLET ORAL at 23:13

## 2024-10-21 RX ADMIN — INSULIN GLARGINE 10 UNITS: 100 INJECTION, SOLUTION SUBCUTANEOUS at 23:36

## 2024-10-21 RX ADMIN — INSULIN LISPRO 2 UNITS: 100 INJECTION, SOLUTION INTRAVENOUS; SUBCUTANEOUS at 23:15

## 2024-10-21 RX ADMIN — OXYBUTYNIN CHLORIDE 5 MG: 5 TABLET ORAL at 10:43

## 2024-10-21 RX ADMIN — HEPARIN SODIUM 5000 UNITS: 5000 INJECTION INTRAVENOUS; SUBCUTANEOUS at 14:09

## 2024-10-21 RX ADMIN — VALPROIC ACID 125 MG: 250 SOLUTION ORAL at 23:13

## 2024-10-21 RX ADMIN — TRAZODONE HYDROCHLORIDE 100 MG: 100 TABLET ORAL at 23:13

## 2024-10-21 RX ADMIN — ACETAMINOPHEN 650 MG: 325 TABLET ORAL at 23:13

## 2024-10-21 RX ADMIN — LEVOTHYROXINE SODIUM 25 MCG: 25 TABLET ORAL at 06:23

## 2024-10-21 RX ADMIN — INSULIN LISPRO 2 UNITS: 100 INJECTION, SOLUTION INTRAVENOUS; SUBCUTANEOUS at 01:06

## 2024-10-21 RX ADMIN — BACLOFEN 10 MG: 10 TABLET ORAL at 17:23

## 2024-10-21 RX ADMIN — HEPARIN SODIUM 5000 UNITS: 5000 INJECTION INTRAVENOUS; SUBCUTANEOUS at 06:23

## 2024-10-21 RX ADMIN — CARBIDOPA AND LEVODOPA 2 TABLET: 25; 100 TABLET, EXTENDED RELEASE ORAL at 17:23

## 2024-10-21 RX ADMIN — IRON SUCROSE 300 MG: 20 INJECTION, SOLUTION INTRAVENOUS at 08:58

## 2024-10-21 NOTE — NURSING NOTE
As per provider order - tube feeding was administered from 1800 and stopped at 0600.     Endorsed to the oncoming RN to reach out to team to place the order for tube feeds to include time 1800 to 0600    Gauri Martinez Team resident made aware.

## 2024-10-21 NOTE — PLAN OF CARE
Problem: Prexisting or High Potential for Compromised Skin Integrity  Goal: Skin integrity is maintained or improved  Description: INTERVENTIONS:  - Identify patients at risk for skin breakdown  - Assess and monitor skin integrity  - Assess and monitor nutrition and hydration status  - Monitor labs   - Assess for incontinence   - Turn and reposition patient  - Assist with mobility/ambulation  - Relieve pressure over bony prominences  - Avoid friction and shearing  - Provide appropriate hygiene as needed including keeping skin clean and dry  - Evaluate need for skin moisturizer/barrier cream  - Collaborate with interdisciplinary team   - Patient/family teaching  - Consider wound care consult   Outcome: Progressing     Problem: PAIN - ADULT  Goal: Verbalizes/displays adequate comfort level or baseline comfort level  Description: Interventions:  - Encourage patient to monitor pain and request assistance  - Assess pain using appropriate pain scale  - Administer analgesics based on type and severity of pain and evaluate response  - Implement non-pharmacological measures as appropriate and evaluate response  - Consider cultural and social influences on pain and pain management  - Notify physician/advanced practitioner if interventions unsuccessful or patient reports new pain  Outcome: Progressing     Problem: INFECTION - ADULT  Goal: Absence or prevention of progression during hospitalization  Description: INTERVENTIONS:  - Assess and monitor for signs and symptoms of infection  - Monitor lab/diagnostic results  - Monitor all insertion sites, i.e. indwelling lines, tubes, and drains  - Monitor endotracheal if appropriate and nasal secretions for changes in amount and color  - [place] appropriate cooling/warming therapies per order  - Administer medications as ordered  - Instruct and encourage patient and family to use good hand hygiene technique  - Identify and instruct in appropriate isolation precautions for  identified infection/condition  Outcome: Progressing  Goal: Absence of fever/infection during neutropenic period  Description: INTERVENTIONS:  - Monitor WBC    Outcome: Progressing     Problem: SAFETY ADULT  Goal: Patient will remain free of falls  Description: INTERVENTIONS:  - Educate patient/family on patient safety including physical limitations  - Instruct patient to call for assistance with activity   - Consult OT/PT to assist with strengthening/mobility   - Keep Call bell within reach  - Keep bed low and locked with side rails adjusted as appropriate  - Keep care items and personal belongings within reach  - Initiate and maintain comfort rounds  - Make Fall Risk Sign visible to staff  - Offer Toileting every 2 Hours, in advance of need  - Initiate/Maintain bed/chair alarm  - Obtain necessary fall risk management equipment:   - Apply yellow socks and bracelet for high fall risk patients  - Consider moving patient to room near nurses station  Outcome: Progressing  Goal: Maintain or return to baseline ADL function  Description: INTERVENTIONS:  -  Assess patient's ability to carry out ADLs; assess patient's baseline for ADL function and identify physical deficits which impact ability to perform ADLs (bathing, care of mouth/teeth, toileting, grooming, dressing, etc.)  - Assess/evaluate cause of self-care deficits   - Assess range of motion  - Assess patient's mobility; develop plan if impaired  - Assess patient's need for assistive devices and provide as appropriate  - Encourage maximum independence but intervene and supervise when necessary  - Involve family in performance of ADLs  - Assess for home care needs following discharge   - Consider OT consult to assist with ADL evaluation and planning for discharge  - Provide patient education as appropriate  Outcome: Progressing  Goal: Maintains/Returns to pre admission functional level  Description: INTERVENTIONS:  - Perform AM-PAC 6 Click Basic Mobility/ Daily  Activity assessment daily.  - Set and communicate daily mobility goal to care team and patient/family/caregiver.   - Collaborate with rehabilitation services on mobility goals if consulted  - Perform Range of Motion 3 times a day.  - Reposition patient every 2 hours.  - Dangle patient 3 times a day  - Stand patient 3 times a day  - Ambulate patient 3 times a day  - Out of bed to chair 3 times a day   - Out of bed for meals 3 times a day  - Out of bed for toileting  - Record patient progress and toleration of activity level   Outcome: Progressing     Problem: DISCHARGE PLANNING  Goal: Discharge to home or other facility with appropriate resources  Description: INTERVENTIONS:  - Identify barriers to discharge w/patient and caregiver  - Arrange for needed discharge resources and transportation as appropriate  - Identify discharge learning needs (meds, wound care, etc.)  - Arrange for interpretive services to assist at discharge as needed  - Refer to Case Management Department for coordinating discharge planning if the patient needs post-hospital services based on physician/advanced practitioner order or complex needs related to functional status, cognitive ability, or social support system  Outcome: Progressing     Problem: Knowledge Deficit  Goal: Patient/family/caregiver demonstrates understanding of disease process, treatment plan, medications, and discharge instructions  Description: Complete learning assessment and assess knowledge base.  Interventions:  - Provide teaching at level of understanding  - Provide teaching via preferred learning methods  Outcome: Progressing     Problem: Nutrition/Hydration-ADULT  Goal: Nutrient/Hydration intake appropriate for improving, restoring or maintaining nutritional needs  Description: Monitor and assess patient's nutrition/hydration status for malnutrition. Collaborate with interdisciplinary team and initiate plan and interventions as ordered.  Monitor patient's weight and  dietary intake as ordered or per policy. Utilize nutrition screening tool and intervene as necessary. Determine patient's food preferences and provide high-protein, high-caloric foods as appropriate.     INTERVENTIONS:  - Monitor oral intake, urinary output, labs, and treatment plans  - Assess nutrition and hydration status and recommend course of action  - Evaluate amount of meals eaten  - Assist patient with eating if necessary   - Allow adequate time for meals  - Recommend/ encourage appropriate diets, oral nutritional supplements, and vitamin/mineral supplements  - Order, calculate, and assess calorie counts as needed  - Recommend, monitor, and adjust tube feedings and TPN/PPN based on assessed needs  - Assess need for intravenous fluids  - Provide specific nutrition/hydration education as appropriate  - Include patient/family/caregiver in decisions related to nutrition  Outcome: Progressing

## 2024-10-21 NOTE — ASSESSMENT & PLAN NOTE
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

## 2024-10-21 NOTE — PROGRESS NOTES
Progress Note - Hospitalist   Name: Terrie Alicea 64 y.o. female I MRN: [id number]  Unit/Bed#: S -01 I Date of Admission: 10/18/2024   Date of Service: 10/21/2024 I Hospital Day: 3    Assessment & Plan  Hypercalcemia  Recent Labs     10/18/24  1959 10/19/24  0037 10/19/24  0436 10/20/24  0532 10/21/24  0525   CALCIUM 14.8*   < > 12.4* 10.1 9.6   ALB 4.3  --  3.7  --  3.7    < > = values in this interval not displayed.     Presented with abnormal lab work showing hypercalcemia of 14.2, repeat CMP showed calcium of 14.8  Per care nurse her mental status was abnormal day prior to admission, but it improved before arrival to ER  Corrected calcium: 14.6, iCal: 1.68, TSH: 0.8  Nephrology was reach out to in the ED recommended calcitonin  Etiology: Unclear right now, possibly from severe dehydration/possible malignancy considering multiple intermediate nodular densities in right breast(reflecting trauma/fat necrosis) in CT scan in 9/2024  Resolved s/p calcitonin    Plan:  PTHrp pending  Nephrology consulted, appreciate recommendations  SPEP/UPEP/FLC ratio ordered  SCAR (acute kidney injury) (HCC)  Recent Labs     10/19/24  0436 10/20/24  0532 10/21/24  0525   CREATININE 2.62* 2.49* 2.26*   EGFR 18 20 22     Estimated Creatinine Clearance: 15.7 mL/min (A) (by C-G formula based on SCr of 2.26 mg/dL (H)).    POA 2.83; (baseline 0.8-1)  US kidney and bladder - Bilateral small echogenic kidneys of medical renal disease.   Per Nephrology, likely secondary to vasoconstriction and hemodynamic changes in setting of hypercalcemia     Plan:  Nephrology consulted, appreciate recommendations  Holding further fluids  Increased free water flushes  Monitor I/Os  Avoid nephrotoxins  Avoid hypoperfusion  Cerebral palsy (HCC)  At baseline is non verbal , totally dependent on care, resides in intermediate care facility  Continue PTA baclofen 10 mg TID  Bipolar disorder (HCC)  Continue PTA Depakene and Abilify  Diabetes mellitus type 2,  insulin dependent (HCC)  Lab Results   Component Value Date    HGBA1C 6.2 (H) 08/13/2024       Recent Labs     10/20/24  1747 10/21/24  0016 10/21/24  0630 10/21/24  1142   POCGLU 146* 270* 340* 144*       Blood Sugar Average: Last 72 hrs:  (P) [id number]  Hold home regimen while inpatient and resume on discharge NovoLog 4 units at 6 PM and midnight while tube feeds are running and sliding scale  Goal -180 while admitted, adjusting insulin regimen as appropriate  Glucose levels noted to be elevated.  Continue sliding scale.  Continue Lantus 10 units BID  Iron deficiency anemia  Recent Labs     10/18/24  1518 10/18/24  1959 10/19/24  0436   HGB 11.0* 10.4* 9.4*   Baseline: 9-11, currently hemoglobin is baseline  Ordered iron studies  Daily CBC  Continue PTA ferrous sulfate  Transfuse if hemoglobin less than 7  Hypothyroidism  Continue PTA levothyroxine  TSH and T4 within normal limits    VTE Pharmacologic Prophylaxis: VTE Score: 4 Moderate Risk (Score 3-4) - Pharmacological DVT Prophylaxis Ordered: heparin.    Mobility:   Basic Mobility Inpatient Raw Score: 6  JH-HLM Goal: 2: Bed activities/Dependent transfer  JH-HLM Achieved: 2: Bed activities/Dependent transfer  JH-HLM Goal achieved. Continue to encourage appropriate mobility.    Patient Centered Rounds: I performed bedside rounds with nursing staff today.   Discussions with Specialists or Other Care Team Provider: Attending, Nephrology    Education and Discussions with Family / Patient:  Will contact caregiver later today..     Current Length of Stay: 3 day(s)  Current Patient Status: Inpatient   Certification Statement: The patient will continue to require additional inpatient hospital stay due to SCAR  Discharge Plan: Anticipate discharge in 48-72 hrs to group home.    Code Status: Level 1 - Full Code    Subjective   No acute overnight events. Patient was seen and examined at bedside. Her lips are extremely dry. Oral care and lip balm applied by  nursing, but patient continues to lick her lips. She is nonverbal at baseline, limiting subjective history and ROS.    Objective :  Temp:  [98.4 °F (36.9 °C)-99.9 °F (37.7 °C)] 99.9 °F (37.7 °C)  HR:  [85-86] 86  BP: ()/(64-74) 113/64  Resp:  [16-18] 17  SpO2:  [89 %-97 %] 94 %    There is no height or weight on file to calculate BMI.     Input and Output Summary (last 24 hours):     Intake/Output Summary (Last 24 hours) at 10/21/2024 1224  Last data filed at 10/20/2024 1430  Gross per 24 hour   Intake --   Output 150 ml   Net -150 ml       Physical Exam  Vitals and nursing note reviewed.   Constitutional:       General: She is not in acute distress.     Appearance: She is well-developed. She is ill-appearing.   HENT:      Head: Normocephalic and atraumatic.      Mouth/Throat:      Comments: Extremely dry lips with scaling  Eyes:      Conjunctiva/sclera: Conjunctivae normal.   Cardiovascular:      Rate and Rhythm: Normal rate and regular rhythm.      Heart sounds: No murmur heard.  Pulmonary:      Effort: Pulmonary effort is normal. No respiratory distress.      Breath sounds: Normal breath sounds.   Abdominal:      Palpations: Abdomen is soft.      Tenderness: There is no abdominal tenderness.   Musculoskeletal:         General: No swelling.      Cervical back: Neck supple.   Skin:     General: Skin is warm and dry.   Neurological:      Mental Status: She is alert. Mental status is at baseline.      Comments: Contracted upper and lower extremities   Psychiatric:         Mood and Affect: Mood normal.           Lines/Drains:              Lab Results: I have reviewed the following results:   Results from last 7 days   Lab Units 10/19/24  0436 10/18/24  1959   WBC Thousand/uL 6.25 6.82   HEMOGLOBIN g/dL 9.4* 10.4*   HEMATOCRIT % 29.9* 32.4*   PLATELETS Thousands/uL 145* 156   SEGS PCT %  --  68   LYMPHO PCT %  --  21   MONO PCT %  --  10   EOS PCT %  --  1     Results from last 7 days   Lab Units 10/21/24  0553  10/20/24  0532 10/19/24  0436   SODIUM mmol/L 143   < > 145   POTASSIUM mmol/L 4.6   < > 4.3   CHLORIDE mmol/L 108   < > 109*   CO2 mmol/L 27   < > 28   BUN mg/dL 52*   < > 61*   CREATININE mg/dL 2.26*   < > 2.62*   ANION GAP mmol/L 8   < > 8   CALCIUM mg/dL 9.6   < > 12.4*   ALBUMIN g/dL 3.7  --  3.7   TOTAL BILIRUBIN mg/dL  --   --  0.36   ALK PHOS U/L  --   --  47   ALT U/L  --   --  <3*   AST U/L  --   --  3*   GLUCOSE RANDOM mg/dL 331*   < > 146*    < > = values in this interval not displayed.         Results from last 7 days   Lab Units 10/21/24  1142 10/21/24  0630 10/21/24  0016 10/20/24  1747 10/20/24  1126 10/20/24  0639 10/20/24  0050 10/19/24  1811 10/19/24  1241 10/19/24  0607 10/19/24  0116   POC GLUCOSE mg/dl 144* 340* 270* 146* 160* 269* 315* 236* 241* 155* 128               Recent Cultures (last 7 days):               Last 24 Hours Medication List:     Current Facility-Administered Medications:     acetaminophen (TYLENOL) tablet 650 mg, Q6H PRN    ARIPiprazole (ABILIFY) tablet 2 mg, HS    ascorbic acid (VITAMIN C) tablet 1,000 mg, Daily    baclofen tablet 10 mg, TID    carbidopa-levodopa (SINEMET CR)  mg per ER tablet 2 tablet, BID    citalopram (CeleXA) tablet 20 mg, Daily    Ferrous Sulfate oral syrup 300 mg, Every Other Day    heparin (porcine) subcutaneous injection 5,000 Units, Q8H [facility]    influenza vaccine, recombinant (PF) (Flublok) IM injection 0.5 mL, Once    insulin glargine (LANTUS) subcutaneous injection 10 Units 0.1 mL, Q12H [facility]    insulin lispro (HumALOG/ADMELOG) 100 units/mL subcutaneous injection 1-5 Units, Q6H [facility] **AND** Fingerstick Glucose (POCT), Q6H    Ketotifen Fumarate (ZADITOR) 0.035 % ophthalmic solution 1 drop, BID PRN    levothyroxine tablet 25 mcg, Early Morning    midodrine (PROAMATINE) tablet 5 mg, TID PRN    oxybutynin (DITROPAN) tablet 5 mg, TID    pantoprazole (PROTONIX) injection 20 mg, Q24H [facility]    polyethylene glycol (MIRALAX) packet 17 g, Daily PRN     pravastatin (PRAVACHOL) tablet 40 mg, Daily With Dinner    sodium chloride 0.9 % bolus 1,000 mL, Once    traZODone (DESYREL) tablet 100 mg, HS    valproic acid (DEPAKENE) oral soln 125 mg, BID    Administrative Statements   Today, Patient Was Seen By: Keiry Ricardo MD      **Please Note: This note may have been constructed using a voice recognition system.**

## 2024-10-21 NOTE — ASSESSMENT & PLAN NOTE
At baseline is non verbal , totally dependent on care, resides in intermediate care facility  Continue PTA baclofen 10 mg TID

## 2024-10-21 NOTE — ASSESSMENT & PLAN NOTE
Etiology: Likely secondary to vasoconstriction and hemodynamic changes in setting of hypercalcemia  Baseline creatinine 0.8  Creatinine on admission 2.83  Creatinine today 2.26  UA: No hematuria  Renal imaging: No hydronephrosis  Kidney function continues to improve slowly  Okay to observe off IV fluids  Continue free water flushes

## 2024-10-21 NOTE — ASSESSMENT & PLAN NOTE
Lab Results   Component Value Date    HGBA1C 6.2 (H) 08/13/2024       Recent Labs     10/20/24  1747 10/21/24  0016 10/21/24  0630 10/21/24  1142   POCGLU 146* 270* 340* 144*       Blood Sugar Average: Last 72 hrs:  (P) [id number]  Hold home regimen while inpatient and resume on discharge NovoLog 4 units at 6 PM and midnight while tube feeds are running and sliding scale  Goal -180 while admitted, adjusting insulin regimen as appropriate  Glucose levels noted to be elevated.  Continue sliding scale.  Continue Lantus 10 units BID

## 2024-10-21 NOTE — PROGRESS NOTES
NEPHROLOGY HOSPITAL PROGRESS NOTE   Terrie Alicea 64 y.o. 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)  Calcium level today 9.6  Status post calcitonin x 4 doses  Continue free water flushes with tube feeds  Continue to observe off IV fluids  Trend calcium level daily  SCAR (acute kidney injury) (HCC)  Etiology: Likely secondary to vasoconstriction and hemodynamic changes in setting of hypercalcemia  Baseline creatinine 0.8  Creatinine on admission 2.83  Creatinine today 2.26  UA: No hematuria  Renal imaging: No hydronephrosis  Kidney function continues to improve slowly  Okay to observe off IV fluids  Continue free water flushes   Cerebral palsy (HCC)  Management per primary team  Diabetes mellitus type 2, insulin dependent (HCC)  Management per primary team  Iron deficiency anemia  Hemoglobin 9.4 mg/dL on 10/19  Ferritin 116/iron saturation 19%  Give Venofer 300 mg x 1  Metabolic alkalosis  Currently resolved    Discussed with internal medicine team.  After discussion, we agreed that calcium level is currently normalized and kidney function is slowly improving and to continue free water flushes and to observe off IV fluids.    SUBJECTIVE / 24H INTERVAL HISTORY:  Review of systems is not available.    OBJECTIVE:  Current Weight:    Vitals:    10/20/24 2310 10/21/24 0014 10/21/24 0730 10/21/24 0730   BP: 99/66  113/64 113/64   Pulse: 85  85 86   Resp: 16  17    Temp: 99.3 °F (37.4 °C)  99.9 °F (37.7 °C) 99.9 °F (37.7 °C)   TempSrc:       SpO2: (!) 89% 90% 94% 94%       Intake/Output Summary (Last 24 hours) at 10/21/2024 0820  Last data filed at 10/20/2024 1430  Gross per 24  hour   Intake --   Output 150 ml   Net -150 ml     Review of Systems   Unable to perform ROS: Patient nonverbal     Physical Exam  Vitals and nursing note reviewed.   Constitutional:       General: She is not in acute distress.     Appearance: She is well-developed.   HENT:      Head: Normocephalic and atraumatic.   Eyes:      Conjunctiva/sclera: Conjunctivae normal.   Cardiovascular:      Rate and Rhythm: Normal rate and regular rhythm.      Pulses: Normal pulses.      Heart sounds: Normal heart sounds. No murmur heard.  Pulmonary:      Effort: Pulmonary effort is normal. No respiratory distress.      Breath sounds: Normal breath sounds.   Abdominal:      Palpations: Abdomen is soft.      Tenderness: There is no abdominal tenderness.   Musculoskeletal:         General: No swelling.      Cervical back: Neck supple.      Right lower leg: No edema.      Left lower leg: No edema.   Skin:     General: Skin is warm and dry.      Capillary Refill: Capillary refill takes less than 2 seconds.   Neurological:      Mental Status: She is alert. Mental status is at baseline.   Psychiatric:         Mood and Affect: Mood normal.       Medications:    Current Facility-Administered Medications:     acetaminophen (TYLENOL) tablet 650 mg, 650 mg, Oral, Q6H PRN, Nicolas Lopez DO    ARIPiprazole (ABILIFY) tablet 2 mg, 2 mg, Per G Tube, HS, Leonidas Sharma MD, 2 mg at 10/20/24 2202    ascorbic acid (VITAMIN C) tablet 1,000 mg, 1,000 mg, Per G Tube, Daily, Leonidas Sharma MD, 1,000 mg at 10/20/24 0905    carbidopa-levodopa (SINEMET CR)  mg per ER tablet 2 tablet, 2 tablet, Oral, BID, Leonidas Sharma MD, 2 tablet at 10/20/24 1743    citalopram (CeleXA) tablet 20 mg, 20 mg, Per G Tube, Daily, Leonidas Sharma MD, 20 mg at 10/20/24 0905    Ferrous Sulfate oral syrup 300 mg, 300 mg, Per G Tube, Every Other Day, Leonidas Sharma MD, 300 mg at 10/19/24 0844    heparin (porcine) subcutaneous injection 5,000 Units, 5,000 Units, Subcutaneous, Q8H [facility],  Leonidas Sharma MD, 5,000 Units at 10/21/24 0623    influenza vaccine, recombinant (PF) (Flublok) IM injection 0.5 mL, 0.5 mL, Intramuscular, Once, Nicolas Lopez,     insulin glargine (LANTUS) subcutaneous injection 10 Units 0.1 mL, 10 Units, Subcutaneous, QAM, Nicolas Lopez DO, 10 Units at 10/20/24 0904    insulin lispro (HumALOG/ADMELOG) 100 units/mL subcutaneous injection 1-5 Units, 1-5 Units, Subcutaneous, Q6H [facility], 3 Units at 10/21/24 0638 **AND** Fingerstick Glucose (POCT), , , Q6H, Leonidas Sharma MD    iron sucrose (VENOFER) 300 mg in sodium chloride 0.9 % 250 mL IVPB, 300 mg, Intravenous, Once, Radames Gómez MD    Ketotifen Fumarate (ZADITOR) 0.035 % ophthalmic solution 1 drop, 1 drop, Both Eyes, BID PRN, Leonidas Sharma MD    levothyroxine tablet 25 mcg, 25 mcg, Per G Tube, Early Morning, Leonidas Sharma MD, 25 mcg at 10/21/24 0623    midodrine (PROAMATINE) tablet 5 mg, 5 mg, Per G Tube, TID PRN, Leonidas Sharma MD    oxybutynin (DITROPAN) tablet 5 mg, 5 mg, Per G Tube, TID, Leonidas Sharma MD, 5 mg at 10/20/24 2201    pantoprazole (PROTONIX) injection 20 mg, 20 mg, Intravenous, Q24H [facility], Leonidas Sharma MD, 20 mg at 10/20/24 0905    polyethylene glycol (MIRALAX) packet 17 g, 17 g, Per G Tube, Daily PRN, Leonidas Sharma MD    pravastatin (PRAVACHOL) tablet 40 mg, 40 mg, Per G Tube, Daily With Dinner, Leonidas Sharma MD, 40 mg at 10/20/24 1743    sodium chloride 0.9 % bolus 1,000 mL, 1,000 mL, Intravenous, Once, Leonidas Sharma MD    traZODone (DESYREL) tablet 100 mg, 100 mg, Per G Tube, HS, Leonidas Sharma MD, 100 mg at 10/20/24 2201    valproic acid (DEPAKENE) oral soln 125 mg, 125 mg, Per G Tube, BID, Leoindas Sharma MD, 125 mg at 10/20/24 2201    Laboratory Results:  Results from last 7 days   Lab Units 10/21/24  0525 10/20/24  0532 10/19/24  0436 10/19/24  0037 10/18/24  1959 10/18/24  1518   WBC Thousand/uL  --   --  6.25  --  6.82 6.90   HEMOGLOBIN g/dL  --   --  9.4*  --  10.4* 11.0*   HEMATOCRIT %  --   --  29.9*  --   "32.4* 34.4*   PLATELETS Thousands/uL  --   --  145*  --  156 173   POTASSIUM mmol/L 4.6 4.6 4.3  --  4.5 4.6   CHLORIDE mmol/L 108 108 109*  --  99 98   CO2 mmol/L 27 29 28  --  36* 36*   BUN mg/dL 52* 50* 61*  --  70* 72*   CREATININE mg/dL 2.26* 2.49* 2.62*  --  2.86* 2.83*   CALCIUM mg/dL 9.6 10.1 12.4* 13.5* 14.8* 14.2*   MAGNESIUM mg/dL 1.9 2.1  --   --   --   --    PHOSPHORUS mg/dL 2.5 3.2  --   --   --   --        Portions of the record may have been created with voice recognition software. Occasional wrong word or \"sound a like\" substitutions may have occurred due to the inherent limitations of voice recognition software. Read the chart carefully and recognize, using context, where substitutions have occurred.If you have any questions, please contact the dictating provider.    "

## 2024-10-21 NOTE — ASSESSMENT & PLAN NOTE
Recent Labs     10/18/24  1959 10/19/24  0037 10/19/24  0436 10/20/24  0532 10/21/24  0525   CALCIUM 14.8*   < > 12.4* 10.1 9.6   ALB 4.3  --  3.7  --  3.7    < > = values in this interval not displayed.     Presented with abnormal lab work showing hypercalcemia of 14.2, repeat CMP showed calcium of 14.8  Per care nurse her mental status was abnormal day prior to admission, but it improved before arrival to ER  Corrected calcium: 14.6, iCal: 1.68, TSH: 0.8  Nephrology was reach out to in the ED recommended calcitonin  Etiology: Unclear right now, possibly from severe dehydration/possible malignancy considering multiple intermediate nodular densities in right breast(reflecting trauma/fat necrosis) in CT scan in 9/2024  Resolved s/p calcitonin    Plan:  PTHrp pending  Nephrology consulted, appreciate recommendations  SPEP/UPEP/FLC ratio ordered

## 2024-10-21 NOTE — ASSESSMENT & PLAN NOTE
Recent Labs     10/19/24  0436 10/20/24  0532 10/21/24  0525   CREATININE 2.62* 2.49* 2.26*   EGFR 18 20 22     Estimated Creatinine Clearance: 15.7 mL/min (A) (by C-G formula based on SCr of 2.26 mg/dL (H)).    POA 2.83; (baseline 0.8-1)  US kidney and bladder - Bilateral small echogenic kidneys of medical renal disease.   Per Nephrology, likely secondary to vasoconstriction and hemodynamic changes in setting of hypercalcemia     Plan:  Nephrology consulted, appreciate recommendations  Holding further fluids  Increased free water flushes  Monitor I/Os  Avoid nephrotoxins  Avoid hypoperfusion

## 2024-10-22 PROBLEM — E83.39 HYPOPHOSPHATEMIA: Status: ACTIVE | Noted: 2024-10-22

## 2024-10-22 LAB
ALBUMIN SERPL BCG-MCNC: 3.7 G/DL (ref 3.5–5)
ANION GAP SERPL CALCULATED.3IONS-SCNC: 12 MMOL/L (ref 4–13)
APTT PPP: 161 SECONDS (ref 23–34)
APTT PPP: 92 SECONDS (ref 23–34)
BACTERIA UR QL AUTO: ABNORMAL /HPF
BILIRUB UR QL STRIP: NEGATIVE
BUN SERPL-MCNC: 60 MG/DL (ref 5–25)
CALCIUM SERPL-MCNC: 9.7 MG/DL (ref 8.4–10.2)
CHLORIDE SERPL-SCNC: 112 MMOL/L (ref 96–108)
CLARITY UR: ABNORMAL
CO2 SERPL-SCNC: 22 MMOL/L (ref 21–32)
COLOR UR: YELLOW
CREAT SERPL-MCNC: 2.31 MG/DL (ref 0.6–1.3)
ERYTHROCYTE [DISTWIDTH] IN BLOOD BY AUTOMATED COUNT: 12.9 % (ref 11.6–15.1)
FLUAV RNA RESP QL NAA+PROBE: NEGATIVE
FLUBV RNA RESP QL NAA+PROBE: NEGATIVE
GFR SERPL CREATININE-BSD FRML MDRD: 21 ML/MIN/1.73SQ M
GLUCOSE SERPL-MCNC: 100 MG/DL (ref 65–140)
GLUCOSE SERPL-MCNC: 108 MG/DL (ref 65–140)
GLUCOSE SERPL-MCNC: 109 MG/DL (ref 65–140)
GLUCOSE SERPL-MCNC: 179 MG/DL (ref 65–140)
GLUCOSE SERPL-MCNC: 190 MG/DL (ref 65–140)
GLUCOSE SERPL-MCNC: 213 MG/DL (ref 65–140)
GLUCOSE SERPL-MCNC: 238 MG/DL (ref 65–140)
GLUCOSE SERPL-MCNC: 254 MG/DL (ref 65–140)
GLUCOSE SERPL-MCNC: 258 MG/DL (ref 65–140)
GLUCOSE SERPL-MCNC: 275 MG/DL (ref 65–140)
GLUCOSE SERPL-MCNC: 475 MG/DL (ref 65–140)
GLUCOSE SERPL-MCNC: 494 MG/DL (ref 65–140)
GLUCOSE SERPL-MCNC: 496 MG/DL (ref 65–140)
GLUCOSE SERPL-MCNC: 500 MG/DL (ref 65–140)
GLUCOSE SERPL-MCNC: 503 MG/DL (ref 65–140)
GLUCOSE UR STRIP-MCNC: ABNORMAL MG/DL
HCT VFR BLD AUTO: 32 % (ref 34.8–46.1)
HGB BLD-MCNC: 9.9 G/DL (ref 11.5–15.4)
HGB UR QL STRIP.AUTO: ABNORMAL
INR PPP: 0.98 (ref 0.85–1.19)
KETONES UR STRIP-MCNC: NEGATIVE MG/DL
LEUKOCYTE ESTERASE UR QL STRIP: ABNORMAL
MAGNESIUM SERPL-MCNC: 2 MG/DL (ref 1.9–2.7)
MCH RBC QN AUTO: 32.9 PG (ref 26.8–34.3)
MCHC RBC AUTO-ENTMCNC: 30.9 G/DL (ref 31.4–37.4)
MCV RBC AUTO: 106 FL (ref 82–98)
NITRITE UR QL STRIP: NEGATIVE
NON-SQ EPI CELLS URNS QL MICRO: ABNORMAL /HPF
PH UR STRIP.AUTO: 6 [PH]
PHOSPHATE SERPL-MCNC: 1.1 MG/DL (ref 2.3–4.1)
PLATELET # BLD AUTO: 151 THOUSANDS/UL (ref 149–390)
PMV BLD AUTO: 13 FL (ref 8.9–12.7)
POTASSIUM SERPL-SCNC: 3.9 MMOL/L (ref 3.5–5.3)
PROCALCITONIN SERPL-MCNC: 4.79 NG/ML
PROT UR STRIP-MCNC: ABNORMAL MG/DL
PROTHROMBIN TIME: 13.7 SECONDS (ref 12.3–15)
RBC # BLD AUTO: 3.01 MILLION/UL (ref 3.81–5.12)
RBC #/AREA URNS AUTO: ABNORMAL /HPF
RSV RNA RESP QL NAA+PROBE: NEGATIVE
SARS-COV-2 RNA RESP QL NAA+PROBE: NEGATIVE
SODIUM SERPL-SCNC: 146 MMOL/L (ref 135–147)
SP GR UR STRIP.AUTO: 1.01 (ref 1–1.03)
UROBILINOGEN UR STRIP-ACNC: <2 MG/DL
WBC # BLD AUTO: 6.26 THOUSAND/UL (ref 4.31–10.16)
WBC #/AREA URNS AUTO: ABNORMAL /HPF
WBC CLUMPS # UR AUTO: PRESENT /UL

## 2024-10-22 PROCEDURE — 87086 URINE CULTURE/COLONY COUNT: CPT

## 2024-10-22 PROCEDURE — 85730 THROMBOPLASTIN TIME PARTIAL: CPT

## 2024-10-22 PROCEDURE — 85027 COMPLETE CBC AUTOMATED: CPT | Performed by: STUDENT IN AN ORGANIZED HEALTH CARE EDUCATION/TRAINING PROGRAM

## 2024-10-22 PROCEDURE — 80069 RENAL FUNCTION PANEL: CPT | Performed by: STUDENT IN AN ORGANIZED HEALTH CARE EDUCATION/TRAINING PROGRAM

## 2024-10-22 PROCEDURE — 85730 THROMBOPLASTIN TIME PARTIAL: CPT | Performed by: INTERNAL MEDICINE

## 2024-10-22 PROCEDURE — 84166 PROTEIN E-PHORESIS/URINE/CSF: CPT | Performed by: INTERNAL MEDICINE

## 2024-10-22 PROCEDURE — 84145 PROCALCITONIN (PCT): CPT

## 2024-10-22 PROCEDURE — 87186 SC STD MICRODIL/AGAR DIL: CPT

## 2024-10-22 PROCEDURE — 99233 SBSQ HOSP IP/OBS HIGH 50: CPT | Performed by: INTERNAL MEDICINE

## 2024-10-22 PROCEDURE — 82948 REAGENT STRIP/BLOOD GLUCOSE: CPT

## 2024-10-22 PROCEDURE — 86334 IMMUNOFIX E-PHORESIS SERUM: CPT | Performed by: INTERNAL MEDICINE

## 2024-10-22 PROCEDURE — 83521 IG LIGHT CHAINS FREE EACH: CPT | Performed by: INTERNAL MEDICINE

## 2024-10-22 PROCEDURE — 86335 IMMUNFIX E-PHORSIS/URINE/CSF: CPT | Performed by: INTERNAL MEDICINE

## 2024-10-22 PROCEDURE — 83735 ASSAY OF MAGNESIUM: CPT | Performed by: STUDENT IN AN ORGANIZED HEALTH CARE EDUCATION/TRAINING PROGRAM

## 2024-10-22 PROCEDURE — 0241U HB NFCT DS VIR RESP RNA 4 TRGT: CPT | Performed by: INTERNAL MEDICINE

## 2024-10-22 PROCEDURE — 85610 PROTHROMBIN TIME: CPT

## 2024-10-22 PROCEDURE — 81001 URINALYSIS AUTO W/SCOPE: CPT

## 2024-10-22 PROCEDURE — 99232 SBSQ HOSP IP/OBS MODERATE 35: CPT | Performed by: INTERNAL MEDICINE

## 2024-10-22 PROCEDURE — 87040 BLOOD CULTURE FOR BACTERIA: CPT

## 2024-10-22 PROCEDURE — 84165 PROTEIN E-PHORESIS SERUM: CPT | Performed by: INTERNAL MEDICINE

## 2024-10-22 PROCEDURE — 87077 CULTURE AEROBIC IDENTIFY: CPT

## 2024-10-22 RX ORDER — BACLOFEN 10 MG/1
10 TABLET ORAL 3 TIMES DAILY PRN
Status: DISCONTINUED | OUTPATIENT
Start: 2024-10-22 | End: 2024-10-25

## 2024-10-22 RX ORDER — HEPARIN SODIUM 1000 [USP'U]/ML
3200 INJECTION, SOLUTION INTRAVENOUS; SUBCUTANEOUS ONCE
Status: COMPLETED | OUTPATIENT
Start: 2024-10-22 | End: 2024-10-22

## 2024-10-22 RX ORDER — SODIUM CHLORIDE, SODIUM GLUCONATE, SODIUM ACETATE, POTASSIUM CHLORIDE, MAGNESIUM CHLORIDE, SODIUM PHOSPHATE, DIBASIC, AND POTASSIUM PHOSPHATE .53; .5; .37; .037; .03; .012; .00082 G/100ML; G/100ML; G/100ML; G/100ML; G/100ML; G/100ML; G/100ML
75 INJECTION, SOLUTION INTRAVENOUS CONTINUOUS
Status: DISCONTINUED | OUTPATIENT
Start: 2024-10-22 | End: 2024-10-23

## 2024-10-22 RX ORDER — TRAZODONE HYDROCHLORIDE 100 MG/1
100 TABLET ORAL
Status: DISCONTINUED | OUTPATIENT
Start: 2024-10-22 | End: 2024-11-04 | Stop reason: HOSPADM

## 2024-10-22 RX ORDER — HEPARIN SODIUM 1000 [USP'U]/ML
3200 INJECTION, SOLUTION INTRAVENOUS; SUBCUTANEOUS EVERY 6 HOURS PRN
Status: DISCONTINUED | OUTPATIENT
Start: 2024-10-22 | End: 2024-10-24

## 2024-10-22 RX ORDER — INSULIN GLARGINE 100 [IU]/ML
10 INJECTION, SOLUTION SUBCUTANEOUS
Status: DISCONTINUED | OUTPATIENT
Start: 2024-10-22 | End: 2024-10-23

## 2024-10-22 RX ORDER — HEPARIN SODIUM 10000 [USP'U]/100ML
3-30 INJECTION, SOLUTION INTRAVENOUS
Status: DISCONTINUED | OUTPATIENT
Start: 2024-10-22 | End: 2024-10-24

## 2024-10-22 RX ORDER — SODIUM CHLORIDE, SODIUM GLUCONATE, SODIUM ACETATE, POTASSIUM CHLORIDE, MAGNESIUM CHLORIDE, SODIUM PHOSPHATE, DIBASIC, AND POTASSIUM PHOSPHATE .53; .5; .37; .037; .03; .012; .00082 G/100ML; G/100ML; G/100ML; G/100ML; G/100ML; G/100ML; G/100ML
50 INJECTION, SOLUTION INTRAVENOUS CONTINUOUS
Status: DISCONTINUED | OUTPATIENT
Start: 2024-10-22 | End: 2024-10-22

## 2024-10-22 RX ORDER — HEPARIN SODIUM 1000 [USP'U]/ML
1600 INJECTION, SOLUTION INTRAVENOUS; SUBCUTANEOUS EVERY 6 HOURS PRN
Status: DISCONTINUED | OUTPATIENT
Start: 2024-10-22 | End: 2024-10-24

## 2024-10-22 RX ORDER — INSULIN LISPRO 100 [IU]/ML
2 INJECTION, SOLUTION INTRAVENOUS; SUBCUTANEOUS ONCE
Status: COMPLETED | OUTPATIENT
Start: 2024-10-22 | End: 2024-10-22

## 2024-10-22 RX ORDER — ENOXAPARIN SODIUM 100 MG/ML
1 INJECTION SUBCUTANEOUS EVERY 12 HOURS
Status: DISCONTINUED | OUTPATIENT
Start: 2024-10-22 | End: 2024-10-22

## 2024-10-22 RX ADMIN — PANTOPRAZOLE SODIUM 20 MG: 40 INJECTION, POWDER, FOR SOLUTION INTRAVENOUS at 10:56

## 2024-10-22 RX ADMIN — HEPARIN SODIUM 3200 UNITS: 1000 INJECTION INTRAVENOUS; SUBCUTANEOUS at 13:23

## 2024-10-22 RX ADMIN — ACETAMINOPHEN 650 MG: 325 TABLET ORAL at 23:57

## 2024-10-22 RX ADMIN — VALPROIC ACID 125 MG: 250 SOLUTION ORAL at 10:54

## 2024-10-22 RX ADMIN — INSULIN LISPRO 2 UNITS: 100 INJECTION, SOLUTION INTRAVENOUS; SUBCUTANEOUS at 00:27

## 2024-10-22 RX ADMIN — ARIPIPRAZOLE 2 MG: 2 TABLET ORAL at 23:57

## 2024-10-22 RX ADMIN — HEPARIN SODIUM 3200 UNITS: 1000 INJECTION INTRAVENOUS; SUBCUTANEOUS at 13:26

## 2024-10-22 RX ADMIN — INSULIN GLARGINE 10 UNITS: 100 INJECTION, SOLUTION SUBCUTANEOUS at 23:54

## 2024-10-22 RX ADMIN — OXYBUTYNIN CHLORIDE 5 MG: 5 TABLET ORAL at 10:55

## 2024-10-22 RX ADMIN — POTASSIUM PHOSPHATE, MONOBASIC AND POTASSIUM PHOSPHATE, DIBASIC 30 MMOL: 224; 236 INJECTION, SOLUTION, CONCENTRATE INTRAVENOUS at 11:04

## 2024-10-22 RX ADMIN — INSULIN LISPRO 2 UNITS: 100 INJECTION, SOLUTION INTRAVENOUS; SUBCUTANEOUS at 23:54

## 2024-10-22 RX ADMIN — CARBIDOPA AND LEVODOPA 2 TABLET: 25; 100 TABLET, EXTENDED RELEASE ORAL at 17:21

## 2024-10-22 RX ADMIN — HEPARIN SODIUM 5000 UNITS: 5000 INJECTION INTRAVENOUS; SUBCUTANEOUS at 06:14

## 2024-10-22 RX ADMIN — SODIUM CHLORIDE, SODIUM GLUCONATE, SODIUM ACETATE, POTASSIUM CHLORIDE, MAGNESIUM CHLORIDE, SODIUM PHOSPHATE, DIBASIC, AND POTASSIUM PHOSPHATE 75 ML/HR: .53; .5; .37; .037; .03; .012; .00082 INJECTION, SOLUTION INTRAVENOUS at 18:18

## 2024-10-22 RX ADMIN — CITALOPRAM HYDROBROMIDE 20 MG: 20 TABLET, FILM COATED ORAL at 10:55

## 2024-10-22 RX ADMIN — OXYBUTYNIN CHLORIDE 5 MG: 5 TABLET ORAL at 15:52

## 2024-10-22 RX ADMIN — ACETAMINOPHEN 650 MG: 325 TABLET ORAL at 15:57

## 2024-10-22 RX ADMIN — CARBIDOPA AND LEVODOPA 2 TABLET: 25; 100 TABLET, EXTENDED RELEASE ORAL at 10:55

## 2024-10-22 RX ADMIN — SODIUM CHLORIDE 10 UNITS/HR: 9 INJECTION, SOLUTION INTRAVENOUS at 02:00

## 2024-10-22 RX ADMIN — LEVOTHYROXINE SODIUM 25 MCG: 25 TABLET ORAL at 06:06

## 2024-10-22 RX ADMIN — INSULIN LISPRO 1 UNITS: 100 INJECTION, SOLUTION INTRAVENOUS; SUBCUTANEOUS at 18:11

## 2024-10-22 RX ADMIN — OXYBUTYNIN CHLORIDE 5 MG: 5 TABLET ORAL at 23:57

## 2024-10-22 RX ADMIN — HEPARIN SODIUM 18 UNITS/KG/HR: 10000 INJECTION, SOLUTION INTRAVENOUS at 13:24

## 2024-10-22 RX ADMIN — POTASSIUM PHOSPHATE, MONOBASIC AND POTASSIUM PHOSPHATE, DIBASIC 30 MMOL: 224; 236 INJECTION, SOLUTION, CONCENTRATE INTRAVENOUS at 10:59

## 2024-10-22 RX ADMIN — PRAVASTATIN SODIUM 40 MG: 40 TABLET ORAL at 15:52

## 2024-10-22 RX ADMIN — OXYCODONE HYDROCHLORIDE AND ACETAMINOPHEN 1000 MG: 500 TABLET ORAL at 10:55

## 2024-10-22 RX ADMIN — CEFTRIAXONE 1000 MG: 2 INJECTION, POWDER, FOR SOLUTION INTRAMUSCULAR; INTRAVENOUS at 17:21

## 2024-10-22 RX ADMIN — VALPROIC ACID 125 MG: 250 SOLUTION ORAL at 23:57

## 2024-10-22 NOTE — ASSESSMENT & PLAN NOTE
Recent Labs     10/20/24  0532 10/21/24  0525 10/22/24  0506   CREATININE 2.49* 2.26* 2.31*   EGFR 20 22 21     Estimated Creatinine Clearance: 15.9 mL/min (A) (by C-G formula based on SCr of 2.31 mg/dL (H)).    POA 2.83; (baseline 0.8-1)  US kidney and bladder - Bilateral small echogenic kidneys of medical renal disease.   Per Nephrology, likely secondary to vasoconstriction and hemodynamic changes in setting of hypercalcemia     Plan:  Nephrology consulted, appreciate recommendations  Fluids 75 cc/hr for 10 hours  Free water flushes  Monitor I/Os  Avoid nephrotoxins  Avoid hypoperfusion

## 2024-10-22 NOTE — ED PROVIDER NOTES
Time reflects when diagnosis was documented in both MDM as applicable and the Disposition within this note       Time User Action Codes Description Comment    10/18/2024  9:34 PM Rocio Ayon Add [E83.52] Hypercalcemia     10/18/2024  9:34 PM Rocio Ayon Add [N17.9] SCAR (acute kidney injury) (HCC)     10/19/2024  8:55 AM Davion Keiry Add [G80.0] Spastic quadriplegic cerebral palsy (HCC)           ED Disposition       ED Disposition   Admit    Condition   Stable    Date/Time   Fri Oct 18, 2024  9:34 PM    Comment   Case was discussed with Dr. Sharma and the patient's admission status was agreed to be Admission Status: inpatient status to the service of Dr. Lopez .               Assessment & Plan       Medical Decision Making  64-year-old female presents with abnormal outpatient labs.  Patient was noted to have acute kidney injury and hypercalcemia.  Differential diagnosis includes but not limited to lab abnormality secondary to dehydration, medical renal disease, underlying malignancy, parathyroid disease    Problems Addressed:  SCAR (acute kidney injury) (HCC): acute illness or injury  Hypercalcemia: acute illness or injury    Amount and/or Complexity of Data Reviewed  Independent Historian: caregiver     Details: Patient's primary caregiver for the past 16 years at the bedside to help her right history  External Data Reviewed: labs.     Details: Labs that were obtained earlier today and prompted admission to ED reviewed by me.  Labs: ordered.     Details: Labs ordered and independently interpreted by me, SCAR and hypercalcemia confirmed.  ECG/medicine tests: ordered and independent interpretation performed. Decision-making details documented in ED Course.    Risk  Decision regarding hospitalization.             Medications   sodium chloride 0.9 % bolus 1,000 mL (has no administration in time range)   sodium chloride 0.9 % bolus 1,000 mL (0 mL Intravenous Stopped 10/18/24 9704)   calcitonin (salmon) (MIACALCIN) 1  Unit/0.1 mL skin test 1 Units (has no administration in time range)       ED Risk Strat Scores                                               History of Present Illness       Chief Complaint   Patient presents with    Abnormal Lab     Pt had out pt lab work done and pts care giver was told pt in in ARF and to go to ER. Pt has not been herself but no specific concerns but intake has been down       Past Medical History:   Diagnosis Date    Acute metabolic encephalopathy 12/11/2015    Adjustment disorder     Altered mental status 12/11/2015    Anemia     Bipolar 1 disorder (HCC)     Cerebral palsy (HCC)     Chronic hypernatremia 02/06/2016    Closed fracture of left hip (HCC) 01/19/2016    Closed left hip fracture (HCC)     no surgery    Constipation     Dehydration 02/20/2016    Developmental delay     Diabetes mellitus ([facility])     Difficulty walking     Disease of thyroid gland     Diverticulosis     Dysphagia     Worsening    Fracture of multiple ribs of right side     Herpes zoster without complication 06/02/2021    Hip fracture (Former[facility]) 07/26/2015    left    Hyperlipidemia     Hypernatremia 12/28/2018    Hypotension     Impulse control disorder     Incontinence     Intellectual disability due to developmental disorder, unspecified     Microalbuminuria     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)     Osteopathia     Osteoporosis     Peripheral neuropathy     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HCC)     Thrombocytopenia ([facility]) 07/31/2015      Past Surgical History:   Procedure Laterality Date    ABDOMINAL SURGERY      COLECTOMY MIN      re-anastomosis 7/22/16    COLON SURGERY  1/31/16    COLONOSCOPY N/A 07/21/2016    Procedure: COLONOSCOPY;  Surgeon: Rosalino Jacome MD;  [facility]: [facility];  Service:     COLONOSCOPY N/A 01/31/2016    Procedure: COLONOSCOPY;  Surgeon: Rosalino Jacome MD;  [facility];  Service:     COLONOSCOPY N/A 07/25/2017    Procedure: COLONOSCOPY;  Surgeon: Rosalino Jacome MD;  [facility];  Service:  Colorectal    COLOSTOMY      EXPLORATORY LAPAROTOMY W/ BOWEL RESECTION N/A 01/31/2016    Procedure: exploratory laparotomy, left sigmoidectomy, coloproctostomy, take down splenic flexure, loop colostomy;  Surgeon: Rosalino Jacome MD;  [facility];  Service:     GASTROSTOMY TUBE PLACEMENT N/A 8/26/2024    Procedure: INSERTION GASTROSTOMY TUBE OPEN;  Surgeon: Monroe Jean DO;  [facility];  Service: General    TX CLOSURE ENTEROSTOMY LG/SMALL INTESTINE N/A 07/22/2016    Procedure: SEGMENTAL COLECTOMY WITH COLOCOLOSTOMY;  Surgeon: Rosalino Jacome MD;  [facility];  Service: Colorectal    UPPER GASTROINTESTINAL ENDOSCOPY        Family History   Problem Relation Age of Onset    Alcohol abuse Mother     Alcohol abuse Father     Diabetes Sister     No Known Problems Sister     No Known Problems Sister       Social History     Tobacco Use    Smoking status: Never    Smokeless tobacco: Never   Vaping Use    Vaping status: Never Used   Substance Use Topics    Alcohol use: Not Currently    Drug use: No      E-Cigarette/Vaping    E-Cigarette Use Never User       E-Cigarette/Vaping Substances    Nicotine No     THC No     CBD No     Flavoring No     Other No     Unknown No       I have reviewed and agree with the history as documented.     64-year-old nonverbal female with a history of cerebral palsy presents to the emergency department accompanied by her caregiver for evaluation of abnormal outpatient labs.  Patient's caregiver for the past 16 years at the bedside.  She reports that she took the patient for routine blood work that was ordered by her PCP last week.  They are contacted today with results that were significantly abnormal.  Patient was noted to have an acute kidney injury and very high calcium levels.  Patient's caregiver felt that the patient was slightly off from her baseline.  She is nonverbal.  She was noted to have some tremors today which was not something that they  have witnessed her to have.  Patient is also nonambulatory.  No reported fevers.  No cough.      History provided by:  Caregiver and medical records  History limited by:  Patient nonverbal  Other  Location:  Abnormal outpatient labs  Quality:  Patient noted to have slight tremors today  Severity:  Severe  Onset quality:  Unable to specify  Timing:  Unable to specify  Progression:  Unchanged  Chronicity:  New  Context:  Patient receives all nutrition through a PEG tube  Ineffective treatments:  None attempted prior to arrival  Associated symptoms: no diarrhea, no fever and no loss of consciousness        Review of Systems   Unable to perform ROS: Patient nonverbal   Constitutional:  Negative for fever.   Gastrointestinal:  Negative for diarrhea.   Neurological:  Negative for loss of consciousness.           Objective       ED Triage Vitals   Temperature Pulse Blood Pressure Respirations SpO2 Patient Position - Orthostatic VS   10/18/24 1844 10/18/24 1843 10/18/24 1844 10/18/24 1844 10/18/24 1844 --   97.5 °F (36.4 °C) 83 148/72 16 97 %       Temp Source Heart Rate Source BP Location FiO2 (%) Pain Score    10/18/24 1844 -- -- -- 10/20/24 0107    Axillary    Med Not Given for Pain - for MAR use only      Vitals      Date and Time Temp Pulse SpO2 Resp BP Pain Score FACES Pain Rating User   10/21/24 2033 99.1 °F (37.3 °C) -- -- -- -- -- -- BM   10/21/24 1928 101 °F (38.3 °C) 102 92 % -- -- -- -- DII   10/21/24 1506 99.6 °F (37.6 °C) 102 89 % 18 132/74 -- -- DII   10/21/24 0900 -- -- -- -- -- No Pain -- EH   10/21/24 0730 99.9 °F (37.7 °C) 86 94 % -- 113/64 -- -- DII   10/21/24 0730 99.9 °F (37.7 °C) 85 94 % 17 113/64 -- -- DII   10/21/24 0014 -- -- 90 % -- -- -- -- AV   10/20/24 2310 -- -- -- -- -- No Pain -- AV   10/20/24 2310 99.3 °F (37.4 °C) 85 89 % 16 99/66 -- -- DII   10/20/24 2300 -- -- 91 % -- -- -- -- AV   10/20/24 1609 98.4 °F (36.9 °C) 85 97 % 18 128/74 -- -- DII   10/20/24 1100 -- -- -- -- -- No Pain -- FB    10/20/24 0737 99.4 °F (37.4 °C) 78 97 % 16 128/71 -- -- DII   10/20/24 0107 -- -- -- -- -- Med Not Given for Pain - for MAR use only -- LM   10/19/24 2321 100 °F (37.8 °C) 84 96 % 16 111/61 -- -- DII   10/19/24 1617 -- 90 96 % -- 120/83 -- -- DII   10/19/24 1539 99.3 °F (37.4 °C) 95 97 % 16 125/101 -- -- DII   10/19/24 0757 98.5 °F (36.9 °C) 81 97 % -- 121/70 -- -- DII   10/19/24 0754 98.5 °F (36.9 °C) 84 98 % 16 94/46 -- -- DII   10/18/24 2350 98.8 °F (37.1 °C) 78 96 % 18 118/67 -- -- DII   10/18/24 1844 97.5 °F (36.4 °C) 80 97 % 16 148/72 -- -- RLN   10/18/24 1843 -- 83 -- -- -- -- -- RLN            Physical Exam  Vitals reviewed.   Constitutional:       General: She is not in acute distress.     Appearance: She is not ill-appearing.   HENT:      Head: Normocephalic.      Right Ear: External ear normal.      Left Ear: External ear normal.      Nose: Nose normal.      Mouth/Throat:      Mouth: Mucous membranes are dry.   Eyes:      General: No scleral icterus.     Conjunctiva/sclera: Conjunctivae normal.   Cardiovascular:      Rate and Rhythm: Normal rate.   Pulmonary:      Effort: Pulmonary effort is normal. No respiratory distress.      Breath sounds: No wheezing or rhonchi.   Abdominal:      Palpations: Abdomen is soft.      Tenderness: There is no abdominal tenderness. There is no right CVA tenderness or left CVA tenderness.      Comments: G-tube in place insertion site is clean and dry   Musculoskeletal:         General: No tenderness.      Cervical back: Normal range of motion and neck supple.      Right lower leg: No edema.      Left lower leg: No edema.   Skin:     General: Skin is warm and dry.      Capillary Refill: Capillary refill takes less than 2 seconds.   Neurological:      Mental Status: She is alert. Mental status is at baseline.         Results Reviewed       Procedure Component Value Units Date/Time    T4, free [[id number]]  (Normal) Collected: 10/18/24 1959    Lab Status: Final result  "Specimen: Blood from Arm, Right Updated: 10/19/24 0458     Free T4 1.08 ng/dL     Narrative:        \"Therapeutic range for patients medicated with thyroid disorders: 0.61-1.24 ng/dL.\"    Vitamin D 25 hydroxy [[id number]]  (Normal) Collected: 10/18/24 1959    Lab Status: Final result Specimen: Blood from Arm, Right Updated: 10/19/24 0111     Vit D, 25-Hydroxy 91.8 ng/mL     PTH, intact [[id number]]  (Abnormal) Collected: 10/18/24 1959    Lab Status: Final result Specimen: Blood from Arm, Right Updated: 10/19/24 0052     PTH 4.7 pg/mL     Comprehensive metabolic panel [[id number]]  (Abnormal) Collected: 10/18/24 1959    Lab Status: Final result Specimen: Blood from Arm, Right Updated: 10/18/24 2053     Sodium 144 mmol/L      Potassium 4.5 mmol/L      Chloride 99 mmol/L      CO2 36 mmol/L      ANION GAP 9 mmol/L      BUN 70 mg/dL      Creatinine 2.86 mg/dL      Glucose 95 mg/dL      Calcium 14.8 mg/dL      AST 4 U/L      ALT 3 U/L      Alkaline Phosphatase 54 U/L      Total Protein 8.0 g/dL      Albumin 4.3 g/dL      Total Bilirubin 0.33 mg/dL      eGFR 16 ml/min/1.73sq m     Narrative:      National Kid[employer] Foundation guidelines for Chronic Kidney Disease (CKD):     Stage 1 with normal or high GFR (GFR > 90 mL/min/1.73 square meters)    Stage 2 Mild CKD (GFR = 60-89 mL/min/1.73 square meters)    Stage 3A Moderate CKD (GFR = 45-59 mL/min/1.73 square meters)    Stage 3B Moderate CKD (GFR = 30-44 mL/min/1.73 square meters)    Stage 4 Severe CKD (GFR = 15-29 mL/min/1.73 square meters)    Stage 5 End Stage CKD (GFR <15 mL/min/1.73 square meters)  Note: GFR calculation is accurate only with a steady state creatinine    TSH, 3rd generation with Free T4 reflex [[id number]]  (Normal) Collected: 10/18/24 1959    Lab Status: Final result Specimen: Blood from Arm, Right Updated: 10/18/24 2044     TSH 3RD GENERATON 0.848 uIU/mL     Calcium, ionized [[id number]]  (Abnormal) Collected: 10/18/24 1959    Lab Status: Final result " Specimen: Blood from Arm, Right Updated: 10/18/24 2027     Calcium, Ionized 1.69 mmol/L     CBC and differential [[id number]]  (Abnormal) Collected: 10/18/24 1959    Lab Status: Final result Specimen: Blood from Arm, Right Updated: 10/18/24 2010     WBC 6.82 Thousand/uL      RBC 3.15 Million/uL      Hemoglobin 10.4 g/dL      Hematocrit 32.4 %       fL      MCH 33.0 pg      MCHC 32.1 g/dL      RDW 13.0 %      MPV 13.0 fL      Platelets 156 Thousands/uL      nRBC 0 /100 WBCs      Segmented % 68 %      Immature Grans % 0 %      Lymphocytes % 21 %      Monocytes % 10 %      Eosinophils Relative 1 %      Basophils Relative 0 %      Absolute Neutrophils 4.56 Thousands/µL      Absolute Immature Grans 0.01 Thousand/uL      Absolute Lymphocytes 1.46 Thousands/µL      Absolute Monocytes 0.67 Thousand/µL      Eosinophils Absolute 0.09 Thousand/µL      Basophils Absolute 0.03 Thousands/µL             US kidney and bladder   Final Interpretation by Austin Nelson MD (10/19 125)   Study technically limited.      Bilateral small echogenic kidneys of medical renal disease.   Possible debris in the bladder. Correlate with urinalysis.            Workstation performed: [id number]         XR chest portable    (Results Pending)       ECG 12 Lead Documentation Only    Date/Time: 10/18/2024 8:33 PM    Performed by: Rocio Ayon DO  Authorized by: Rocio Ayon DO    Indications / Diagnosis:  Hypercalcemia  ECG reviewed by me, the ED Provider: yes    Patient location:  ED  Interpretation:     Interpretation: normal    Rate:     ECG rate:  61    ECG rate assessment: normal    Rhythm:     Rhythm: sinus rhythm    Ectopy:     Ectopy: none    QRS:     QRS axis:  Normal  T waves:     T waves: normal        ED Medication and Procedure Management   Prior to Admission Medications   Prescriptions Last Dose Informant Patient Reported? Taking?   ARIPiprazole (ABILIFY) 2 mg tablet 10/17/2024 Care Giver Yes Yes   Si tablet by Per G Tube  route daily at bedtime   Alcohol Swabs 70 % PADS   No No   Sig: May substitute brand based on insurance coverage. Check glucose TID.   Ascorbic Acid (vitamin C) 1000 MG tablet 10/18/2024  No Yes   Si tablet (1,000 mg total) by Per G Tube route daily   Blood Glucose Monitoring Suppl (OneTouch Verio Reflect) w/Device KIT   No No   Sig: May substitute brand based on insurance coverage. Check glucose TID.   Patient taking differently: QID and PRN   Ferrous Sulfate 300 (60 Fe) mg/5 mL solution 10/18/2024  No Yes   Si mL (300 mg total) by Per G Tube route every other day   Patient taking differently: 300 mg by Per G Tube route every other day   Glucagon (Gvoke HypoPen 2-Pack) 0.5 MG/0.1ML SOAJ   No No   Sig: Inject 1 mg under the skin every 15 (fifteen) minutes as needed (for symptomatic blood glucose <60 and/or if patient is unconcious or seizing) Call 911 after use of pen and then notify Endocrinology provider   Gvoke HypoPen 2-Pack 1 MG/0.2ML SOAJ   Yes No   Insulin Pen Needle (BD Pen Needle Yue 2nd Gen) 32G X 4 MM MISC   No No   Sig: For use with insulin pen. Pharmacy may dispense brand covered by insurance.   LORazepam (Ativan) 0.5 mg tablet 10/17/2024  No Yes   Si tablet (0.5 mg total) by Per G Tube route daily at bedtime for 10 days Hold for sedation   NovoLIN R FlexPen 100 UNIT/ML SOPN   Yes No   OneTouch Delica Lancets 33G MISC   No No   Sig: May substitute brand based on insurance coverage. Check glucose TID.   Refresh Liquigel 1 % GEL 10/18/2024 Care Giver Yes Yes   Si (two) times a day 1 drop Bid bilaterally   Vitamins A & D (VITAMIN A & D) ointment 10/18/2024 Care Giver Yes Yes   Sig: Apply topically as needed for skin irritation   baclofen 10 mg tablet 10/18/2024  No Yes   Si tablet (10 mg total) by Per G Tube route 3 (three) times a day   calcium carbonate (OS-MORRIS) 600 MG tablet 10/18/2024  No Yes   Si tablet (600 mg total) by Per G Tube route 2 (two) times a day   calcium carbonate  (OS-MORRIS) 600 MG tablet   Yes No   Si,500 mg   Patient not taking: Reported on 10/10/2024   carbidopa-levodopa (SINEMET CR)  mg TBCR per ER tablet 10/18/2024 Care Giver No Yes   Sig: TAKE 2 TABLETS BY MOUTH (50/200MG) TWICE A DAY (PARKINSONS DISEASE)   Patient taking differently: Take 2 tablets by mouth 2 (two) times a day Crushed for Via G tube   cholecalciferol (VITAMIN D3) 1,000 units tablet 10/18/2024  No Yes   Si tablet (1,000 Units total) by Per G Tube route daily   citalopram (CeleXA) 20 mg tablet 10/18/2024  No Yes   Si tablet (20 mg total) by Per G Tube route daily   escitalopram (LEXAPRO) 10 mg tablet   Yes No   glucose 40 %   No No   Sig: DISSOLVE IN WARM WATER and administer via peg tube as needed for asymptomatic for blood sugars less than 60 mg/dL and notify endocrine provider. Check blood glucose level in 15 minutes and repeat administration if blood glucose levels are less than 100. Repeat process until blood glucose level is above 100.   glucose blood (OneTouch Verio) test strip   No No   Sig: May substitute brand based on insurance coverage. Check glucose TID.   Patient taking differently: daily as needed QID and PRN   guaiFENesin (DIABETIC TUSSIN EX) 100 MG/5ML oral liquid   No No   Sig: 10 mL (200 mg total) by Per G Tube route 4 (four) times a day as needed for cough or congestion   hydrocortisone 1 % cream  Care Giver No No   Sig: Apply topically to affected area twice daily as needed for rash   insulin regular (HumuLIN R,NovoLIN R) 100 units/mL injection 10/18/2024  No Yes   Sig: Inject 4 units subcutaneously at 6 PM and midnight while tube feeds are running   insulin regular (HumuLIN R,NovoLIN R) 100 units/mL injection 10/18/2024  No Yes   Sig: Inject 1-5 Units under the skin every 6 (six) hours if 0 - 179 = 0; 180 - 224 = 1 unit; 225 - 299 = 2 unit; 300 - 374 = 3 unit; 375 - 449 = 4 unit; 450+ = 5 unit.  Call MD for BS less than 60 or greater than 450   lansoprazole (PREVACID  SOLUTAB) 15 mg disintegrating tablet 10/18/2024  No Yes   Si tablet (15 mg total) by Per G Tube route daily Please dissolve in water.   levothyroxine 25 mcg tablet 10/18/2024  No Yes   Si tablet (25 mcg total) by Per G Tube route daily TAKE 1 TABLET BY MOUTH 1 HOUR BEFORE STARTING TUBE FEEDS (HYPOTHYROIDISM)   methenamine hippurate (HIPREX) 1 g tablet 10/18/2024  No Yes   Sig: Crush 1 tablet two times a day per peg tube   midodrine (PROAMATINE) 5 mg tablet 10/18/2024  No Yes   Si tablet (5 mg total) by Per G Tube route 3 (three) times a day For hypotension, hold for systolic blood pressure greater than 110.   Patient taking differently: 5 mg by Per G Tube route 3 (three) times a day before meals For hypotension, hold for systolic blood pressure greater than 110.   neomycin-bacitracin-polymyxin b (NEOSPORIN) ointment  Care Giver Yes No   Sig: Apply 1 application topically 2 (two) times a day as needed Apply to affected area BID PRN   olopatadine HCl (PATADAY) 0.2 % opth drops  Care Giver Yes No   Sig: Administer 1 drop to both eyes as needed Instill 1 drop into affected eyes daily PRN for eye redness   oxybutynin (DITROPAN) 5 mg tablet 10/18/2024  No Yes   Si tablet (5 mg total) by Per G Tube route 3 (three) times a day   polyethylene glycol (GLYCOLAX) 17 GM/SCOOP powder 10/18/2024  No Yes   Si g by Per G Tube route daily   Patient taking differently: 17 g by Per G Tube route daily Hold one day for loose stools   simvastatin (ZOCOR) 20 mg tablet 10/18/2024  No Yes   Sig: Daily at 4:00 PM.   traZODone (DESYREL) 100 mg tablet 10/17/2024  No Yes   Si tablet (100 mg total) by Per G Tube route daily at bedtime   Patient taking differently: 100 mg by Per G Tube route daily at bedtime   valproic acid (DEPAKENE) 250 MG/5ML soln 10/18/2024  No Yes   Si.5 mL (125 mg total) by Per G Tube route 2 (two) times a day   Patient taking differently: 125 mg by Per G Tube route 2 (two) times a day       Facility-Administered Medications: None     Current Discharge Medication List        CONTINUE these medications which have NOT CHANGED    Details   ARIPiprazole (ABILIFY) 2 mg tablet 1 tablet by Per G Tube route daily at bedtime      Ascorbic Acid (vitamin C) 1000 MG tablet 1 tablet (1,000 mg total) by Per G Tube route daily  Qty: 90 tablet, Refills: 3    Associated Diagnoses: Recurrent UTI      baclofen 10 mg tablet 1 tablet (10 mg total) by Per G Tube route 3 (three) times a day  Qty: 84 tablet, Refills: 0    Associated Diagnoses: Spasticity as late effect of cerebrovascular accident (CVA)      !! calcium carbonate (OS-MORRIS) 600 MG tablet 1 tablet (600 mg total) by Per G Tube route 2 (two) times a day    Associated Diagnoses: Osteoporosis, unspecified osteoporosis type, unspecified pathological fracture presence      carbidopa-levodopa (SINEMET CR)  mg TBCR per ER tablet TAKE 2 TABLETS BY MOUTH (50/200MG) TWICE A DAY (PARKINSONS DISEASE)  Qty: 112 tablet, Refills: 10    Associated Diagnoses: Parkinsonism, unspecified Parkinsonism type ([facility])      cholecalciferol (VITAMIN D3) 1,000 units tablet 1 tablet (1,000 Units total) by Per G Tube route daily  Qty: 90 tablet, Refills: 0    Associated Diagnoses: Osteoporosis      citalopram (CeleXA) 20 mg tablet 1 tablet (20 mg total) by Per G Tube route daily  Qty: 30 tablet, Refills: 1    Associated Diagnoses: Mood disturbance      Ferrous Sulfate 300 (60 Fe) mg/5 mL solution 5 mL (300 mg total) by Per G Tube route every other day  Qty: 38 mL, Refills: 1    Associated Diagnoses: Severe protein-calorie malnutrition (HCC)      !! insulin regular (HumuLIN R,NovoLIN R) 100 units/mL injection Inject 4 units subcutaneously at 6 PM and midnight while tube feeds are running  Qty: 3 mL, Refills: 1    Associated Diagnoses: Diabetes mellitus type 2, insulin dependent (HCC)      !! insulin regular (HumuLIN R,NovoLIN R) 100 units/mL injection Inject 1-5 Units under the skin every 6  (six) hours if 0 - 179 = 0; 180 - 224 = 1 unit; 225 - 299 = 2 unit; 300 - 374 = 3 unit; 375 - 449 = 4 unit; 450+ = 5 unit.  Call MD for BS less than 60 or greater than 450  Qty: 15 mL, Refills: 1    Associated Diagnoses: Diabetes mellitus type 2, insulin dependent (HCC)      lansoprazole (PREVACID SOLUTAB) 15 mg disintegrating tablet 1 tablet (15 mg total) by Per G Tube route daily Please dissolve in water.  Qty: 30 tablet, Refills: 1    Associated Diagnoses: Gastroesophageal reflux disease with esophagitis without hemorrhage      levothyroxine 25 mcg tablet 1 tablet (25 mcg total) by Per G Tube route daily TAKE 1 TABLET BY MOUTH 1 HOUR BEFORE STARTING TUBE FEEDS (HYPOTHYROIDISM)    Associated Diagnoses: Hypothyroidism, unspecified type      LORazepam (Ativan) 0.5 mg tablet 1 tablet (0.5 mg total) by Per G Tube route daily at bedtime for 10 days Hold for sedation    Associated Diagnoses: Bipolar disorder (HCC)      methenamine hippurate (HIPREX) 1 g tablet Crush 1 tablet two times a day per peg tube    Associated Diagnoses: Recurrent UTI      midodrine (PROAMATINE) 5 mg tablet 1 tablet (5 mg total) by Per G Tube route 3 (three) times a day For hypotension, hold for systolic blood pressure greater than 110.  Qty: 90 tablet, Refills: 1    Associated Diagnoses: Hypotension, unspecified hypotension type      oxybutynin (DITROPAN) 5 mg tablet 1 tablet (5 mg total) by Per G Tube route 3 (three) times a day    Associated Diagnoses: Urinary incontinence, unspecified type      polyethylene glycol (GLYCOLAX) 17 GM/SCOOP powder 17 g by Per G Tube route daily  Qty: 510 g, Refills: 0    Associated Diagnoses: Constipation, unspecified constipation type      Refresh Liquigel 1 % GEL 2 (two) times a day 1 drop Bid bilaterally      simvastatin (ZOCOR) 20 mg tablet Daily at 4:00 PM.    Associated Diagnoses: Hyperlipidemia, unspecified hyperlipidemia type      traZODone (DESYREL) 100 mg tablet 1 tablet (100 mg total) by Per G Tube  route daily at bedtime    Associated Diagnoses: Mood disturbance      valproic acid (DEPAKENE) 250 MG/5ML soln 2.5 mL (125 mg total) by Per G Tube route 2 (two) times a day  Qty: 473 mL, Refills: 1    Associated Diagnoses: Mood disturbance      Vitamins A & D (VITAMIN A & D) ointment Apply topically as needed for skin irritation      Alcohol Swabs 70 % PADS May substitute brand based on insurance coverage. Check glucose TID.  Qty: 100 each, Refills: 0    Associated Diagnoses: Type 2 diabetes mellitus without complication, with long-term current use of insulin ([facility])      Blood Glucose Monitoring Suppl (OneTouch Verio Reflect) w/Device KIT May substitute brand based on insurance coverage. Check glucose TID.  Qty: 1 kit, Refills: 0    Associated Diagnoses: Type 2 diabetes mellitus without complication, with long-term current use of insulin ([facility])      !! calcium carbonate (OS-MORRIS) 600 MG tablet 1,500 mg      escitalopram (LEXAPRO) 10 mg tablet       Glucagon (Gvoke HypoPen 2-Pack) 0.5 MG/0.1ML SOAJ Inject 1 mg under the skin every 15 (fifteen) minutes as needed (for symptomatic blood glucose <60 and/or if patient is unconcious or seizing) Call 911 after use of pen and then notify Endocrinology provider  Qty: 0.2 mL, Refills: 0    Associated Diagnoses: Diabetes mellitus type 2, insulin dependent (HCC)      glucose 40 % DISSOLVE IN WARM WATER and administer via peg tube as needed for asymptomatic for blood sugars less than 60 mg/dL and notify endocrine provider. Check blood glucose level in 15 minutes and repeat administration if blood glucose levels are less than 100. Repeat process until blood glucose level is above 100.  Qty: 12.5 g, Refills: 1    Associated Diagnoses: Diabetes mellitus type 2, insulin dependent (HCC)      glucose blood (OneTouch Verio) test strip May substitute brand based on insurance coverage. Check glucose TID.  Qty: 100 each, Refills: 0    Comments: For price check  Associated Diagnoses: Diabetes  mellitus due to underlying condition with ketoacidosis without coma, with long-term current use of insulin ([facility])      guaiFENesin (DIABETIC TUSSIN EX) 100 MG/5ML oral liquid 10 mL (200 mg total) by Per G Tube route 4 (four) times a day as needed for cough or congestion    Associated Diagnoses: Viral illness      Gvoke HypoPen 2-Pack 1 MG/0.2ML SOAJ       hydrocortisone 1 % cream Apply topically to affected area twice daily as needed for rash  Qty: 30 g, Refills: 0    Associated Diagnoses: Skin irritation      Insulin Pen Needle (BD Pen Needle Yue 2nd Gen) 32G X 4 MM MISC For use with insulin pen. Pharmacy may dispense brand covered by insurance.  Qty: 100 each, Refills: 0    Associated Diagnoses: Diabetes mellitus due to underlying condition with ketoacidosis without coma, with long-term current use of insulin ([facility])      neomycin-bacitracin-polymyxin b (NEOSPORIN) ointment Apply 1 application topically 2 (two) times a day as needed Apply to affected area BID PRN      NovoLIN R FlexPen 100 UNIT/ML SOPN       olopatadine HCl (PATADAY) 0.2 % opth drops Administer 1 drop to both eyes as needed Instill 1 drop into affected eyes daily PRN for eye redness      OneTouch Delica Lancets 33G MISC May substitute brand based on insurance coverage. Check glucose TID.  Qty: 100 each, Refills: 0    Associated Diagnoses: Type 2 diabetes mellitus without complication, with long-term current use of insulin ([facility])       !! - Potential duplicate medications found. Please discuss with provider.        No discharge procedures on file.  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

## 2024-10-22 NOTE — ASSESSMENT & PLAN NOTE
Chest x-ray 10/21 showing faint inferior perihilar patchiness with subtle obscuration of right cardiomediastinal silhouette could represent findings of aspiration, no evidence of pulmonary edema  Primary team has a concern for PE, can do VQ scan for further evaluation  Currently on empiric IV heparin for PE  Monitor respiratory status with low-dose IV fluids as above

## 2024-10-22 NOTE — ASSESSMENT & PLAN NOTE
Hemoglobin 9.9 today  Status post IV Venofer 300 mg x 1 on 10/21  Ferritin 116/iron saturation 19%

## 2024-10-22 NOTE — NURSING NOTE
Confirmed with provider, Keiry Ricardo MD to hold insulin drip due to pt's blood glucose being at 100. According to algorithm 1, insulin drip to be stopped. Writer also confirmed with provider to hold morning dose of Lantus.

## 2024-10-22 NOTE — PROGRESS NOTES
Progress Note - Hospitalist   Name: Terrie Alicea 64 y.o. female I MRN: [id number]  Unit/Bed#: S -01 I Date of Admission: 10/18/2024   Date of Service: 10/22/2024 I Hospital Day: 4    Assessment & Plan  Acute hypoxic respiratory failure (HCC)  10/21 Patient requiring up to 6L/min O2 to maintain saturations > 90%.   10/21 CXR - Faint inferior perihilar patchiness with subtle obscuration of the right cardiomediastinal silhouette could represent findings of aspiration in the appropriate clinical context.  10/22 Fever overnight. Intermittent shallow breathing noted. Lung examination clear    Plan  Maintain supplemental O2 to SpO2 > 92%, titrate down as able  Defer CTA chest PE study given SCAR  Will consider VQ scan  Will empirically treat with heparin gtt for now  SCAR (acute kidney injury) ([facility])  Recent Labs     10/20/24  0532 10/21/24  0525 10/22/24  0506   CREATININE 2.49* 2.26* 2.31*   EGFR 20 22 21     Estimated Creatinine Clearance: 15.9 mL/min (A) (by C-G formula based on SCr of 2.31 mg/dL (H)).    POA 2.83; (baseline 0.8-1)  US kidney and bladder - Bilateral small echogenic kidneys of medical renal disease.   Per Nephrology, likely secondary to vasoconstriction and hemodynamic changes in setting of hypercalcemia     Plan:  Nephrology consulted, appreciate recommendations  Fluids 75 cc/hr for 10 hours  Free water flushes  Monitor I/Os  Avoid nephrotoxins  Avoid hypoperfusion  SIRS (systemic inflammatory response syndrome) ([facility])  10/21 Met SIRS criteria with fever, tachycardia  No suspected source of infection at this time    Plan  Ordered UA  Blood cultures obtained  Monitor off antibiotics pending above data  Diabetes mellitus type 2, insulin dependent ([facility])  Lab Results   Component Value Date    HGBA1C 6.2 (H) 08/13/2024       Recent Labs     10/22/24  0821 10/22/24  1007 10/22/24  1151 10/22/24  1403   POCGLU 108 100 109 179*       Blood Sugar Average: Last 72 hrs:  (P) [id number]  Hold home  regimen while inpatient and resume on discharge NovoLog 4 units at 6 PM and midnight while tube feeds are running and sliding scale  Goal -180 while admitted, adjusting insulin regimen as appropriate  10/22 Glucose levels elevated overnight, requiring insulin gtt    Plan  Continue Lantus 10 units qHS for now. Will reassess insulin needs now that Glucerna is available. Can likely switch to home regimen if able.  Continue sliding scale Algorithm 1 q6h  Hypercalcemia  Recent Labs     10/20/24  0532 10/21/24  0525 10/22/24  0506   CALCIUM 10.1 9.6 9.7   ALB  --  3.7 3.7     Presented with abnormal lab work showing hypercalcemia of 14.2, repeat CMP showed calcium of 14.8  Per care nurse her mental status was abnormal day prior to admission, but it improved before arrival to ER  Corrected calcium: 14.6, iCal: 1.68, TSH: 0.8  Nephrology was reach out to in the ED recommended calcitonin  Etiology: Unclear right now, possibly from severe dehydration/possible malignancy considering multiple intermediate nodular densities in right breast(reflecting trauma/fat necrosis) in CT scan in 9/2024  Resolved s/p calcitonin    Plan:  PTHrp pending  Nephrology consulted, appreciate recommendations  SPEP/UPEP/FLC ratio pending  Cerebral palsy (HCC)  At baseline is non verbal , totally dependent on care, resides in intermediate care facility  Continue PTA baclofen as TID prn for muscle spasms  Bipolar disorder (HCC)  Continue PTA Depakene and Abilify  Iron deficiency anemia  Recent Labs     10/21/24  0525 10/22/24  0506   HGB 9.1* 9.9*   Baseline: 9-11, currently hemoglobin is baseline  Ordered iron studies  Daily CBC  Continue PTA ferrous sulfate  Transfuse if hemoglobin less than 7  Hypothyroidism  Continue PTA levothyroxine  TSH and T4 within normal limits  Hypophosphatemia  Recent Labs     10/20/24  0532 10/21/24  0525 10/22/24  0506   PHOS 3.2 2.5 1.1*     Replete as needed    VTE Pharmacologic Prophylaxis: VTE Score: 4 Moderate  Risk (Score 3-4) - Pharmacological DVT Prophylaxis Ordered: heparin drip.    Mobility:   Basic Mobility Inpatient Raw Score: 6  JH-HLM Goal: 2: Bed activities/Dependent transfer  JH-HLM Achieved: 2: Bed activities/Dependent transfer  JH-HLM Goal achieved. Continue to encourage appropriate mobility.    Patient Centered Rounds: I performed bedside rounds with nursing staff today.   Discussions with Specialists or Other Care Team Provider: Attending, Nephrology    Education and Discussions with Family / Patient:  Will contact later today.     Current Length of Stay: 4 day(s)  Current Patient Status: Inpatient   Certification Statement: The patient will continue to require additional inpatient hospital stay due to acute hypoxic respiratory failure  Discharge Plan: Anticipate discharge in 48-72 hrs to group home.    Code Status: Level 1 - Full Code    Subjective   Overnight, patient developed fever to 101.6. Patient was seen and examined at bedside. On my examination, she was awake. Nonverbal at baseline. Remians on 6L/min O2 with SpO2 90%.     Objective :  Temp:  [99.1 °F (37.3 °C)-101.6 °F (38.7 °C)] 100.9 °F (38.3 °C)  HR:  [] 111  BP: (102-132)/(54-83) 102/54  Resp:  [16-18] 16  SpO2:  [89 %-93 %] 93 %  O2 Device: None (Room air)    Body mass index is 17.81 kg/m².     Input and Output Summary (last 24 hours):     Intake/Output Summary (Last 24 hours) at 10/22/2024 1423  Last data filed at 10/21/2024 1807  Gross per 24 hour   Intake 0 ml   Output 1023 ml   Net -1023 ml       Physical Exam  Vitals and nursing note reviewed.   Constitutional:       General: She is not in acute distress.     Appearance: She is well-developed. She is ill-appearing.      Comments: Nonverbal at baseline   HENT:      Head: Normocephalic and atraumatic.   Eyes:      Conjunctiva/sclera: Conjunctivae normal.   Cardiovascular:      Rate and Rhythm: Normal rate and regular rhythm.      Heart sounds: No murmur heard.  Pulmonary:      Effort:  Pulmonary effort is normal. No respiratory distress.      Breath sounds: Normal breath sounds.   Abdominal:      Palpations: Abdomen is soft.      Tenderness: There is no abdominal tenderness.   Musculoskeletal:         General: No swelling.      Cervical back: Neck supple.   Skin:     General: Skin is warm and dry.   Neurological:      Mental Status: She is alert. Mental status is at baseline.   Psychiatric:         Mood and Affect: Mood normal.       Lines/Drains:              Lab Results: I have reviewed the following results:   Results from last 7 days   Lab Units 10/22/24  0506 10/19/24  0436 10/18/24  1959   WBC Thousand/uL 6.26   < > 6.82   HEMOGLOBIN g/dL 9.9*   < > 10.4*   HEMATOCRIT % 32.0*   < > 32.4*   PLATELETS Thousands/uL 151   < > 156   SEGS PCT %  --   --  68   LYMPHO PCT %  --   --  21   MONO PCT %  --   --  10   EOS PCT %  --   --  1    < > = values in this interval not displayed.     Results from last 7 days   Lab Units 10/22/24  0506 10/20/24  0532 10/19/24  0436   SODIUM mmol/L 146   < > 145   POTASSIUM mmol/L 3.9   < > 4.3   CHLORIDE mmol/L 112*   < > 109*   CO2 mmol/L 22   < > 28   BUN mg/dL 60*   < > 61*   CREATININE mg/dL 2.31*   < > 2.62*   ANION GAP mmol/L 12   < > 8   CALCIUM mg/dL 9.7   < > 12.4*   ALBUMIN g/dL 3.7   < > 3.7   TOTAL BILIRUBIN mg/dL  --   --  0.36   ALK PHOS U/L  --   --  47   ALT U/L  --   --  <3*   AST U/L  --   --  3*   GLUCOSE RANDOM mg/dL 238*   < > 146*    < > = values in this interval not displayed.     Results from last 7 days   Lab Units 10/22/24  1209   INR  0.98     Results from last 7 days   Lab Units 10/22/24  1403 10/22/24  1151 10/22/24  1007 10/22/24  0821 10/22/24  0600 10/22/24  0419 10/22/24  0159 10/22/24  0107 10/22/24  0013 10/22/24  0007 10/22/24  0006 10/21/24  1753   POC GLUCOSE mg/dl 179* 109 100 108 258* 254* 494* 500* 503* 475* 496* 272*         Results from last 7 days   Lab Units 10/22/24  0506   PROCALCITONIN ng/ml 4.79*       Recent  Cultures (last 7 days):               Last 24 Hours Medication List:     Current Facility-Administered Medications:     acetaminophen (TYLENOL) tablet 650 mg, Q6H PRN    ARIPiprazole (ABILIFY) tablet 2 mg, HS    ascorbic acid (VITAMIN C) tablet 1,000 mg, Daily    baclofen tablet 10 mg, TID PRN    carbidopa-levodopa (SINEMET CR)  mg per ER tablet 2 tablet, BID    citalopram (CeleXA) tablet 20 mg, Daily    Ferrous Sulfate oral syrup 300 mg, Every Other Day    heparin (porcine) 25,000 units in 0.45% NaCl 250 mL infusion (premix), Titrated, Last Rate: 18 Units/kg/hr (10/22/24 1324)    heparin (porcine) injection 1,600 Units, Q6H PRN    heparin (porcine) injection 3,200 Units, Q6H PRN    influenza vaccine, recombinant (PF) (Flublok) IM injection 0.5 mL, Once    insulin glargine (LANTUS) subcutaneous injection 10 Units 0.1 mL, HS    insulin lispro (HumALOG/ADMELOG) 100 units/mL subcutaneous injection 1-5 Units, Q6H ROSEMARY **AND** Fingerstick Glucose (POCT), Q6H    Ketotifen Fumarate (ZADITOR) 0.035 % ophthalmic solution 1 drop, BID PRN    levothyroxine tablet 25 mcg, Early Morning    midodrine (PROAMATINE) tablet 5 mg, TID PRN    multi-electrolyte (PLASMALYTE-A/ISOLYTE-S PH 7.4) IV solution, Continuous    oxybutynin (DITROPAN) tablet 5 mg, TID    pantoprazole (PROTONIX) injection 20 mg, Q24H ROSEMARY    polyethylene glycol (MIRALAX) packet 17 g, Daily PRN    [COMPLETED] potassium phosphates 30 mmol in sodium chloride 0.9 % 250 mL infusion, Once, Last Rate: 30 mmol (10/22/24 1104)    pravastatin (PRAVACHOL) tablet 40 mg, Daily With Dinner    sodium chloride 0.9 % bolus 1,000 mL, Once    traZODone (DESYREL) tablet 100 mg, HS PRN    valproic acid (DEPAKENE) oral soln 125 mg, BID    Administrative Statements   Today, Patient Was Seen By: Keiry Ricardo MD      **Please Note: This note may have been constructed using a voice recognition system.**

## 2024-10-22 NOTE — ASSESSMENT & PLAN NOTE
Recent Labs     10/21/24  0525 10/22/24  0506   HGB 9.1* 9.9*   Baseline: 9-11, currently hemoglobin is baseline  Ordered iron studies  Daily CBC  Continue PTA ferrous sulfate  Transfuse if hemoglobin less than 7

## 2024-10-22 NOTE — ASSESSMENT & PLAN NOTE
Lab Results   Component Value Date    HGBA1C 6.2 (H) 08/13/2024       Recent Labs     10/22/24  0821 10/22/24  1007 10/22/24  1151 10/22/24  1403   POCGLU 108 100 109 179*       Blood Sugar Average: Last 72 hrs:  (P) [id number]  Hold home regimen while inpatient and resume on discharge NovoLog 4 units at 6 PM and midnight while tube feeds are running and sliding scale  Goal -180 while admitted, adjusting insulin regimen as appropriate  10/22 Glucose levels elevated overnight, requiring insulin gtt    Plan  Continue Lantus 10 units qHS for now. Will reassess insulin needs now that Glucerna is available. Can likely switch to home regimen if able.  Continue sliding scale Algorithm 1 q6h

## 2024-10-22 NOTE — ASSESSMENT & PLAN NOTE
Management per primary team  Control hyperglycemia as this can cause volume depletion and worsening of SCAR

## 2024-10-22 NOTE — ASSESSMENT & PLAN NOTE
10/21 Met SIRS criteria with fever, tachycardia  No suspected source of infection at this time    Plan  Ordered UA  Blood cultures obtained  Monitor off antibiotics pending above data

## 2024-10-22 NOTE — ASSESSMENT & PLAN NOTE
At baseline is non verbal , totally dependent on care, resides in intermediate care facility  Continue PTA baclofen as TID prn for muscle spasms

## 2024-10-22 NOTE — PROGRESS NOTES
NEPHROLOGY HOSPITAL PROGRESS NOTE   Terrie Alicea 64 y.o. 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)  Calcium level today 9.7  Status post calcitonin x 4 doses  Continue free water flushes with tube feeds  Resume IV fluids as below due to rising creatinine  Trend calcium level daily  SCAR (acute kidney injury) (HCC)  Etiology: Likely secondary to vasoconstriction and hemodynamic changes in setting of hypercalcemia leading to ATN  Baseline creatinine 0.8  Creatinine on admission 2.83  Creatinine edith 2.26 on 10/21  Creatinine level today 2.31 higher than yesterday  UA: No hematuria  Renal imaging: No hydronephrosis  Continue free water flushes  Start Isolyte 75 cc/h for 10 hours  Trend BMP daily  Cerebral palsy (HCC)  Management per primary team  Diabetes mellitus type 2, insulin dependent (HCC)  Management per primary team  Control hyperglycemia as this can cause volume depletion and worsening of SCAR  Iron deficiency anemia  Hemoglobin 9.9 today  Status post IV Venofer 300 mg x 1 on 10/21  Ferritin 116/iron saturation 19%  Metabolic alkalosis  Currently resolved  Hypophosphatemia  Serum phosphorus level 1.1  Give IV potassium phosphate 30 mmol x 1 today  Acute hypoxic respiratory failure (HCC)  Chest x-ray 10/21 showing faint inferior perihilar patchiness with subtle obscuration of right cardiomediastinal silhouette could represent findings of aspiration, no evidence of pulmonary edema  Primary team has a concern for PE, can do VQ scan for further evaluation  Currently on empiric IV heparin for PE  Monitor respiratory status with low-dose IV fluids as above    Discussed  with internal medicine team.  After discussion, we agreed that there has been some worsening of kidney function today and to provide limited dose of IV fluids and to consider VQ scan for possibility of PE.    SUBJECTIVE / 24H INTERVAL HISTORY:  Urine output recorded as 1023 cc.  Patient is nonverbal.    OBJECTIVE:  Current Weight: Weight - Scale: 40 kg (88 lb 2.9 oz)  Vitals:    10/21/24 2033 10/21/24 2221 10/22/24 0950 10/22/24 1100   BP:  123/83 102/54    BP Location:  Left arm     Pulse:  104 98    Resp:  16     Temp: 99.1 °F (37.3 °C) (!) 101.6 °F (38.7 °C) 100.4 °F (38 °C)    TempSrc: Axillary Oral     SpO2:  90% (!) 89%    Weight:    40 kg (88 lb 2.9 oz)       Intake/Output Summary (Last 24 hours) at 10/22/2024 1245  Last data filed at 10/21/2024 1807  Gross per 24 hour   Intake 0 ml   Output 1023 ml   Net -1023 ml     Review of Systems   Unable to perform ROS: Patient nonverbal     Physical Exam  Vitals and nursing note reviewed.   Constitutional:       General: She is not in acute distress.     Appearance: She is well-developed.   HENT:      Head: Normocephalic and atraumatic.   Eyes:      Conjunctiva/sclera: Conjunctivae normal.   Cardiovascular:      Rate and Rhythm: Normal rate and regular rhythm.      Pulses: Normal pulses.      Heart sounds: Normal heart sounds. No murmur heard.  Pulmonary:      Effort: Pulmonary effort is normal. No respiratory distress.      Breath sounds: Normal breath sounds.   Abdominal:      Palpations: Abdomen is soft.      Tenderness: There is no abdominal tenderness.   Musculoskeletal:         General: No swelling.      Cervical back: Neck supple.      Right lower leg: No edema.      Left lower leg: No edema.   Skin:     General: Skin is warm and dry.      Capillary Refill: Capillary refill takes less than 2 seconds.   Neurological:      Mental Status: She is alert. Mental status is at baseline.       Medications:    Current Facility-Administered Medications:     acetaminophen  (TYLENOL) tablet 650 mg, 650 mg, Oral, Q6H PRN, Nicolas Lopez DO, 650 mg at 10/21/24 2313    ARIPiprazole (ABILIFY) tablet 2 mg, 2 mg, Per G Tube, HS, Leonidas Sharma MD, 2 mg at 10/21/24 2314    ascorbic acid (VITAMIN C) tablet 1,000 mg, 1,000 mg, Per G Tube, Daily, Leonidas Sharma MD, 1,000 mg at 10/22/24 1055    baclofen tablet 10 mg, 10 mg, Oral, TID PRN, Jennifer Hernandez MD    carbidopa-levodopa (SINEMET CR)  mg per ER tablet 2 tablet, 2 tablet, Oral, BID, Leonidas Sharma MD, 2 tablet at 10/22/24 1055    citalopram (CeleXA) tablet 20 mg, 20 mg, Per G Tube, Daily, Leonidas Sharma MD, 20 mg at 10/22/24 1055    Ferrous Sulfate oral syrup 300 mg, 300 mg, Per G Tube, Every Other Day, Leonidas Sharma MD, 300 mg at 10/21/24 1042    heparin (porcine) 25,000 units in 0.45% NaCl 250 mL infusion (premix), 3-30 Units/kg/hr (Order-Specific), Intravenous, Titrated, Jennifer Hernandez MD    heparin (porcine) injection 1,600 Units, 1,600 Units, Intravenous, Q6H PRN, Jennifer Hernandez MD    heparin (porcine) injection 3,200 Units, 3,200 Units, Intravenous, Once, Jennifer Hernandez MD    heparin (porcine) injection 3,200 Units, 3,200 Units, Intravenous, Q6H PRN, Jennifer Hernandez MD    influenza vaccine, recombinant (PF) (Flublok) IM injection 0.5 mL, 0.5 mL, Intramuscular, Once, Nicolas Lopez DO    insulin glargine (LANTUS) subcutaneous injection 10 Units 0.1 mL, 10 Units, Subcutaneous, Q12H [facility], Keiry Ricardo MD, 10 Units at 10/21/24 2336    insulin lispro (HumALOG/ADMELOG) 100 units/mL subcutaneous injection 1-5 Units, 1-5 Units, Subcutaneous, Q6H ROSEMARY, 2 Units at 10/21/24 2315 **AND** Fingerstick Glucose (POCT), , , Q6H, Leonidas Sharma MD    Ketotifen Fumarate (ZADITOR) 0.035 % ophthalmic solution 1 drop, 1 drop, Both Eyes, BID PRN, Leonidas Sharma MD    levothyroxine tablet 25 mcg, 25 mcg, Per G Tube, Early Morning, Leonidas Sharma MD, 25 mcg at 10/22/24 0606    midodrine (PROAMATINE) tablet 5 mg, 5 mg, Per G Tube, TID  PRN, Leonidas Sharma MD    multi-electrolyte (PLASMALYTE-A/ISOLYTE-S PH 7.4) IV solution, 75 mL/hr, Intravenous, Continuous, Radames Gómez MD    oxybutynin (DITROPAN) tablet 5 mg, 5 mg, Per G Tube, TID, Leonidas Sharma MD, 5 mg at 10/22/24 1055    pantoprazole (PROTONIX) injection 20 mg, 20 mg, Intravenous, Q24H ROSEMARY, Leonidas hSarma MD, 20 mg at 10/22/24 1056    polyethylene glycol (MIRALAX) packet 17 g, 17 g, Per G Tube, Daily PRN, Leonidas Sharma MD    [COMPLETED] potassium phosphates 30 mmol in sodium chloride 0.9 % 250 mL infusion, 30 mmol, Intravenous, Once, Radames Gómez MD, Last Rate: 41.7 mL/hr at 10/22/24 1104, 30 mmol at 10/22/24 1104    pravastatin (PRAVACHOL) tablet 40 mg, 40 mg, Per G Tube, Daily With Dinner, Leonidas Sharma MD, 40 mg at 10/21/24 1723    sodium chloride 0.9 % bolus 1,000 mL, 1,000 mL, Intravenous, Once, Leonidas Sharma MD    traZODone (DESYREL) tablet 100 mg, 100 mg, Per G Tube, HS PRN, Jennifer Hernandez MD    valproic acid (DEPAKENE) oral soln 125 mg, 125 mg, Per G Tube, BID, Leonidas Sharma MD, 125 mg at 10/22/24 1054    Laboratory Results:  Results from last 7 days   Lab Units 10/22/24  0506 10/21/24  0525 10/20/24  0532 10/19/24  0436 10/19/24  0037 10/18/24  1959 10/18/24  1518   WBC Thousand/uL 6.26 5.82  --  6.25  --  6.82 6.90   HEMOGLOBIN g/dL 9.9* 9.1*  --  9.4*  --  10.4* 11.0*   HEMATOCRIT % 32.0* 29.1*  --  29.9*  --  32.4* 34.4*   PLATELETS Thousands/uL 151 127*  --  145*  --  156 173   POTASSIUM mmol/L 3.9 4.6 4.6 4.3  --  4.5 4.6   CHLORIDE mmol/L 112* 108 108 109*  --  99 98   CO2 mmol/L 22 27 29 28  --  36* 36*   BUN mg/dL 60* 52* 50* 61*  --  70* 72*   CREATININE mg/dL 2.31* 2.26* 2.49* 2.62*  --  2.86* 2.83*   CALCIUM mg/dL 9.7 9.6 10.1 12.4* 13.5* 14.8* 14.2*   MAGNESIUM mg/dL 2.0 1.9 2.1  --   --   --   --    PHOSPHORUS mg/dL 1.1* 2.5 3.2  --   --   --   --        Portions of the record may have been created with voice recognition software. Occasional wrong word or  "\"sound a like\" substitutions may have occurred due to the inherent limitations of voice recognition software. Read the chart carefully and recognize, using context, where substitutions have occurred.If you have any questions, please contact the dictating provider.    "

## 2024-10-22 NOTE — PLAN OF CARE
Problem: Prexisting or High Potential for Compromised Skin Integrity  Goal: Skin integrity is maintained or improved  Description: INTERVENTIONS:  - Identify patients at risk for skin breakdown  - Assess and monitor skin integrity  - Assess and monitor nutrition and hydration status  - Monitor labs   - Assess for incontinence   - Turn and reposition patient  - Assist with mobility/ambulation  - Relieve pressure over bony prominences  - Avoid friction and shearing  - Provide appropriate hygiene as needed including keeping skin clean and dry  - Evaluate need for skin moisturizer/barrier cream  - Collaborate with interdisciplinary team   - Patient/family teaching  - Consider wound care consult   10/22/2024 1838 by Rissa Mccall  Outcome: Progressing  10/22/2024 1838 by Rissa Mccall  Outcome: Progressing     Problem: PAIN - ADULT  Goal: Verbalizes/displays adequate comfort level or baseline comfort level  Description: Interventions:  - Encourage patient to monitor pain and request assistance  - Assess pain using appropriate pain scale  - Administer analgesics based on type and severity of pain and evaluate response  - Implement non-pharmacological measures as appropriate and evaluate response  - Consider cultural and social influences on pain and pain management  - Notify physician/advanced practitioner if interventions unsuccessful or patient reports new pain  10/22/2024 1838 by Rissa Mccall  Outcome: Progressing  10/22/2024 1838 by Rissa Mccall  Outcome: Progressing     Problem: INFECTION - ADULT  Goal: Absence or prevention of progression during hospitalization  Description: INTERVENTIONS:  - Assess and monitor for signs and symptoms of infection  - Monitor lab/diagnostic results  - Monitor all insertion sites, i.e. indwelling lines, tubes, and drains  - Monitor endotracheal if appropriate and nasal secretions for changes in amount and color  - [place] appropriate cooling/warming therapies per  order  - Administer medications as ordered  - Instruct and encourage patient and family to use good hand hygiene technique  - Identify and instruct in appropriate isolation precautions for identified infection/condition  10/22/2024 1838 by Rissa Mccall  Outcome: Progressing  10/22/2024 1838 by Rissa Mccall  Outcome: Progressing  Goal: Absence of fever/infection during neutropenic period  Description: INTERVENTIONS:  - Monitor WBC    10/22/2024 1838 by Rissa Mccall  Outcome: Progressing  10/22/2024 1838 by Rissa Mccall  Outcome: Progressing     Problem: SAFETY ADULT  Goal: Patient will remain free of falls  Description: INTERVENTIONS:  - Educate patient/family on patient safety including physical limitations  - Instruct patient to call for assistance with activity   - Consult OT/PT to assist with strengthening/mobility   - Keep Call bell within reach  - Keep bed low and locked with side rails adjusted as appropriate  - Keep care items and personal belongings within reach  - Initiate and maintain comfort rounds  - Make Fall Risk Sign visible to staff  - Offer Toileting every 2 Hours, in advance of need  - Initiate/Maintain bed/chair alarm  - Obtain necessary fall risk management equipment:   - Apply yellow socks and bracelet for high fall risk patients  - Consider moving patient to room near nurses station  10/22/2024 1838 by Rissa Mccall  Outcome: Progressing  10/22/2024 1838 by Rissa Mccall  Outcome: Progressing  Goal: Maintain or return to baseline ADL function  Description: INTERVENTIONS:  -  Assess patient's ability to carry out ADLs; assess patient's baseline for ADL function and identify physical deficits which impact ability to perform ADLs (bathing, care of mouth/teeth, toileting, grooming, dressing, etc.)  - Assess/evaluate cause of self-care deficits   - Assess range of motion  - Assess patient's mobility; develop plan if impaired  - Assess patient's need for assistive devices  and provide as appropriate  - Encourage maximum independence but intervene and supervise when necessary  - Involve family in performance of ADLs  - Assess for home care needs following discharge   - Consider OT consult to assist with ADL evaluation and planning for discharge  - Provide patient education as appropriate  10/22/2024 1838 by Rissa Mccall  Outcome: Progressing  10/22/2024 1838 by Rissa Mccall  Outcome: Progressing  Goal: Maintains/Returns to pre admission functional level  Description: INTERVENTIONS:  - Perform AM-PAC 6 Click Basic Mobility/ Daily Activity assessment daily.  - Set and communicate daily mobility goal to care team and patient/family/caregiver.   - Collaborate with rehabilitation services on mobility goals if consulted  - Perform Range of Motion 3 times a day.  - Reposition patient every 2 hours.  - Dangle patient 3 times a day  - Stand patient 3 times a day  - Ambulate patient 3 times a day  - Out of bed to chair 3 times a day   - Out of bed for meals 3 times a day  - Out of bed for toileting  - Record patient progress and toleration of activity level   10/22/2024 1838 by Rissa Mccall  Outcome: Progressing  10/22/2024 1838 by Rissa Mccall  Outcome: Progressing     Problem: DISCHARGE PLANNING  Goal: Discharge to home or other facility with appropriate resources  Description: INTERVENTIONS:  - Identify barriers to discharge w/patient and caregiver  - Arrange for needed discharge resources and transportation as appropriate  - Identify discharge learning needs (meds, wound care, etc.)  - Arrange for interpretive services to assist at discharge as needed  - Refer to Case Management Department for coordinating discharge planning if the patient needs post-hospital services based on physician/advanced practitioner order or complex needs related to functional status, cognitive ability, or social support system  10/22/2024 1838 by Rissa Mccall  Outcome:  Progressing  10/22/2024 1838 by Rissa Mccall  Outcome: Progressing     Problem: Knowledge Deficit  Goal: Patient/family/caregiver demonstrates understanding of disease process, treatment plan, medications, and discharge instructions  Description: Complete learning assessment and assess knowledge base.  Interventions:  - Provide teaching at level of understanding  - Provide teaching via preferred learning methods  10/22/2024 1838 by Rissa Mccall  Outcome: Progressing  10/22/2024 1838 by Rissa Mccall  Outcome: Progressing     Problem: Nutrition/Hydration-ADULT  Goal: Nutrient/Hydration intake appropriate for improving, restoring or maintaining nutritional needs  Description: Monitor and assess patient's nutrition/hydration status for malnutrition. Collaborate with interdisciplinary team and initiate plan and interventions as ordered.  Monitor patient's weight and dietary intake as ordered or per policy. Utilize nutrition screening tool and intervene as necessary. Determine patient's food preferences and provide high-protein, high-caloric foods as appropriate.     INTERVENTIONS:  - Monitor oral intake, urinary output, labs, and treatment plans  - Assess nutrition and hydration status and recommend course of action  - Evaluate amount of meals eaten  - Assist patient with eating if necessary   - Allow adequate time for meals  - Recommend/ encourage appropriate diets, oral nutritional supplements, and vitamin/mineral supplements  - Order, calculate, and assess calorie counts as needed  - Recommend, monitor, and adjust tube feedings and TPN/PPN based on assessed needs  - Assess need for intravenous fluids  - Provide specific nutrition/hydration education as appropriate  - Include patient/family/caregiver in decisions related to nutrition  10/22/2024 1838 by Rissa Mccall  Outcome: Progressing  10/22/2024 1838 by Rissa Mccall  Outcome: Progressing

## 2024-10-22 NOTE — ASSESSMENT & PLAN NOTE
10/21 Patient requiring up to 6L/min O2 to maintain saturations > 90%.   10/21 CXR - Faint inferior perihilar patchiness with subtle obscuration of the right cardiomediastinal silhouette could represent findings of aspiration in the appropriate clinical context.  10/22 Fever overnight. Intermittent shallow breathing noted. Lung examination clear    Plan  Maintain supplemental O2 to SpO2 > 92%, titrate down as able  Defer CTA chest PE study given SCAR  Will consider VQ scan  Will empirically treat with heparin gtt for now

## 2024-10-22 NOTE — ASSESSMENT & PLAN NOTE
Etiology: Likely secondary to vasoconstriction and hemodynamic changes in setting of hypercalcemia leading to ATN  Baseline creatinine 0.8  Creatinine on admission 2.83  Creatinine edith 2.26 on 10/21  Creatinine level today 2.31 higher than yesterday  UA: No hematuria  Renal imaging: No hydronephrosis  Continue free water flushes  Start Isolyte 75 cc/h for 10 hours  Trend BMP daily

## 2024-10-22 NOTE — ASSESSMENT & PLAN NOTE
Recent Labs     10/20/24  0532 10/21/24  0525 10/22/24  0506   CALCIUM 10.1 9.6 9.7   ALB  --  3.7 3.7     Presented with abnormal lab work showing hypercalcemia of 14.2, repeat CMP showed calcium of 14.8  Per care nurse her mental status was abnormal day prior to admission, but it improved before arrival to ER  Corrected calcium: 14.6, iCal: 1.68, TSH: 0.8  Nephrology was reach out to in the ED recommended calcitonin  Etiology: Unclear right now, possibly from severe dehydration/possible malignancy considering multiple intermediate nodular densities in right breast(reflecting trauma/fat necrosis) in CT scan in 9/2024  Resolved s/p calcitonin    Plan:  PTHrp pending  Nephrology consulted, appreciate recommendations  SPEP/UPEP/FLC ratio pending

## 2024-10-22 NOTE — ASSESSMENT & PLAN NOTE
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

## 2024-10-22 NOTE — ASSESSMENT & PLAN NOTE
Recent Labs     10/20/24  0532 10/21/24  0525 10/22/24  0506   PHOS 3.2 2.5 1.1*     Replete as needed

## 2024-10-23 ENCOUNTER — APPOINTMENT (OUTPATIENT)
Dept: NUCLEAR MEDICINE | Facility: HOSPITAL | Age: 64
DRG: 640 | End: 2024-10-23
Payer: MEDICARE

## 2024-10-23 ENCOUNTER — APPOINTMENT (INPATIENT)
Dept: CT IMAGING | Facility: HOSPITAL | Age: 64
DRG: 640 | End: 2024-10-23
Payer: MEDICARE

## 2024-10-23 ENCOUNTER — TELEPHONE (OUTPATIENT)
Age: 64
End: 2024-10-23

## 2024-10-23 ENCOUNTER — APPOINTMENT (INPATIENT)
Dept: RADIOLOGY | Facility: HOSPITAL | Age: 64
DRG: 640 | End: 2024-10-23
Payer: MEDICARE

## 2024-10-23 LAB
ALBUMIN SERPL BCG-MCNC: 3.1 G/DL (ref 3.5–5)
ALBUMIN SERPL ELPH-MCNC: 3.22 G/DL (ref 3.2–5.1)
ALBUMIN SERPL ELPH-MCNC: 48.8 % (ref 48–70)
ALBUMIN UR ELPH-MCNC: 25.5 %
ALPHA1 GLOB MFR UR ELPH: 16.6 %
ALPHA1 GLOB SERPL ELPH-MCNC: 0.47 G/DL (ref 0.15–0.47)
ALPHA1 GLOB SERPL ELPH-MCNC: 7.1 % (ref 1.8–7)
ALPHA2 GLOB MFR UR ELPH: 26.5 %
ALPHA2 GLOB SERPL ELPH-MCNC: 1.04 G/DL (ref 0.42–1.04)
ALPHA2 GLOB SERPL ELPH-MCNC: 15.8 % (ref 5.9–14.9)
ANION GAP SERPL CALCULATED.3IONS-SCNC: 12 MMOL/L (ref 4–13)
APTT PPP: 119 SECONDS (ref 23–34)
APTT PPP: 38 SECONDS (ref 23–34)
B-GLOBULIN MFR UR ELPH: 11.8 %
BASOPHILS # BLD MANUAL: 0 THOUSAND/UL (ref 0–0.1)
BASOPHILS NFR MAR MANUAL: 0 % (ref 0–1)
BETA GLOB ABNORMAL SERPL ELPH-MCNC: 0.42 G/DL (ref 0.31–0.57)
BETA1 GLOB SERPL ELPH-MCNC: 6.3 % (ref 4.7–7.7)
BETA2 GLOB SERPL ELPH-MCNC: 7 % (ref 3.1–7.9)
BETA2+GAMMA GLOB SERPL ELPH-MCNC: 0.46 G/DL (ref 0.2–0.58)
BUN SERPL-MCNC: 67 MG/DL (ref 5–25)
CA-I BLD-SCNC: 1.08 MMOL/L (ref 1.12–1.32)
CALCIUM ALBUM COR SERPL-MCNC: 9.2 MG/DL (ref 8.3–10.1)
CALCIUM SERPL-MCNC: 8.5 MG/DL (ref 8.4–10.2)
CHLORIDE SERPL-SCNC: 110 MMOL/L (ref 96–108)
CO2 SERPL-SCNC: 22 MMOL/L (ref 21–32)
CREAT SERPL-MCNC: 2.08 MG/DL (ref 0.6–1.3)
EOSINOPHIL # BLD MANUAL: 0.35 THOUSAND/UL (ref 0–0.4)
EOSINOPHIL NFR BLD MANUAL: 3 % (ref 0–6)
ERYTHROCYTE [DISTWIDTH] IN BLOOD BY AUTOMATED COUNT: 13.2 % (ref 11.6–15.1)
GAMMA GLOB ABNORMAL SERPL ELPH-MCNC: 0.99 G/DL (ref 0.4–1.66)
GAMMA GLOB MFR UR ELPH: 19.6 %
GAMMA GLOB SERPL ELPH-MCNC: 15 % (ref 6.9–22.3)
GFR SERPL CREATININE-BSD FRML MDRD: 24 ML/MIN/1.73SQ M
GLUCOSE SERPL-MCNC: 118 MG/DL (ref 65–140)
GLUCOSE SERPL-MCNC: 194 MG/DL (ref 65–140)
GLUCOSE SERPL-MCNC: 282 MG/DL (ref 65–140)
GLUCOSE SERPL-MCNC: 287 MG/DL (ref 65–140)
GLUCOSE SERPL-MCNC: 323 MG/DL (ref 65–140)
HCT VFR BLD AUTO: 25.2 % (ref 34.8–46.1)
HGB BLD-MCNC: 7.9 G/DL (ref 11.5–15.4)
IGG/ALB SER: 0.95 {RATIO} (ref 1.1–1.8)
INTERPRETATION UR IFE-IMP: NORMAL
INTERPRETATION UR IFE-IMP: NORMAL
KAPPA LC FREE SER-MCNC: 59 MG/L (ref 3.3–19.4)
KAPPA LC FREE/LAMBDA FREE SER: 1.22 {RATIO} (ref 0.26–1.65)
LAMBDA LC FREE SERPL-MCNC: 48.5 MG/L (ref 5.7–26.3)
LYMPHOCYTES # BLD AUTO: 1.84 THOUSAND/UL (ref 0.6–4.47)
LYMPHOCYTES # BLD AUTO: 16 % (ref 14–44)
MAGNESIUM SERPL-MCNC: 2.1 MG/DL (ref 1.9–2.7)
MCH RBC QN AUTO: 33.1 PG (ref 26.8–34.3)
MCHC RBC AUTO-ENTMCNC: 31.3 G/DL (ref 31.4–37.4)
MCV RBC AUTO: 105 FL (ref 82–98)
METAMYELOCYTE ABSOLUTE CT: 0.35 THOUSAND/UL (ref 0–0.1)
METAMYELOCYTES NFR BLD MANUAL: 3 % (ref 0–1)
MONOCYTES # BLD AUTO: 0.35 THOUSAND/UL (ref 0–1.22)
MONOCYTES NFR BLD: 3 % (ref 4–12)
NEUTROPHILS # BLD MANUAL: 8.63 THOUSAND/UL (ref 1.85–7.62)
NEUTS BAND NFR BLD MANUAL: 14 % (ref 0–8)
NEUTS SEG NFR BLD AUTO: 61 % (ref 43–75)
PHOSPHATE SERPL-MCNC: 3.3 MG/DL (ref 2.3–4.1)
PLATELET # BLD AUTO: 131 THOUSANDS/UL (ref 149–390)
PLATELET BLD QL SMEAR: ABNORMAL
PMV BLD AUTO: 13 FL (ref 8.9–12.7)
POTASSIUM SERPL-SCNC: 4.7 MMOL/L (ref 3.5–5.3)
PROT PATTERN SERPL ELPH-IMP: ABNORMAL
PROT PATTERN UR ELPH-IMP: NORMAL
PROT SERPL-MCNC: 6.6 G/DL (ref 6.4–8.2)
PROT UR-MCNC: 103.8 MG/DL
RBC # BLD AUTO: 2.39 MILLION/UL (ref 3.81–5.12)
RBC MORPH BLD: NORMAL
SODIUM SERPL-SCNC: 144 MMOL/L (ref 135–147)
WBC # BLD AUTO: 11.5 THOUSAND/UL (ref 4.31–10.16)

## 2024-10-23 PROCEDURE — 85027 COMPLETE CBC AUTOMATED: CPT

## 2024-10-23 PROCEDURE — 80069 RENAL FUNCTION PANEL: CPT | Performed by: STUDENT IN AN ORGANIZED HEALTH CARE EDUCATION/TRAINING PROGRAM

## 2024-10-23 PROCEDURE — 86335 IMMUNFIX E-PHORSIS/URINE/CSF: CPT | Performed by: PATHOLOGY

## 2024-10-23 PROCEDURE — 85730 THROMBOPLASTIN TIME PARTIAL: CPT | Performed by: INTERNAL MEDICINE

## 2024-10-23 PROCEDURE — 99233 SBSQ HOSP IP/OBS HIGH 50: CPT | Performed by: INTERNAL MEDICINE

## 2024-10-23 PROCEDURE — 86334 IMMUNOFIX E-PHORESIS SERUM: CPT | Performed by: PATHOLOGY

## 2024-10-23 PROCEDURE — 83735 ASSAY OF MAGNESIUM: CPT | Performed by: STUDENT IN AN ORGANIZED HEALTH CARE EDUCATION/TRAINING PROGRAM

## 2024-10-23 PROCEDURE — 82948 REAGENT STRIP/BLOOD GLUCOSE: CPT

## 2024-10-23 PROCEDURE — 71045 X-RAY EXAM CHEST 1 VIEW: CPT

## 2024-10-23 PROCEDURE — A9540 TC99M MAA: HCPCS

## 2024-10-23 PROCEDURE — 85007 BL SMEAR W/DIFF WBC COUNT: CPT

## 2024-10-23 PROCEDURE — 78580 LUNG PERFUSION IMAGING: CPT

## 2024-10-23 PROCEDURE — 84166 PROTEIN E-PHORESIS/URINE/CSF: CPT | Performed by: PATHOLOGY

## 2024-10-23 PROCEDURE — 82330 ASSAY OF CALCIUM: CPT

## 2024-10-23 PROCEDURE — 84165 PROTEIN E-PHORESIS SERUM: CPT | Performed by: PATHOLOGY

## 2024-10-23 RX ORDER — SODIUM CHLORIDE, SODIUM GLUCONATE, SODIUM ACETATE, POTASSIUM CHLORIDE, MAGNESIUM CHLORIDE, SODIUM PHOSPHATE, DIBASIC, AND POTASSIUM PHOSPHATE .53; .5; .37; .037; .03; .012; .00082 G/100ML; G/100ML; G/100ML; G/100ML; G/100ML; G/100ML; G/100ML
75 INJECTION, SOLUTION INTRAVENOUS CONTINUOUS
Status: DISCONTINUED | OUTPATIENT
Start: 2024-10-23 | End: 2024-10-23

## 2024-10-23 RX ORDER — INSULIN GLARGINE 100 [IU]/ML
15 INJECTION, SOLUTION SUBCUTANEOUS
Status: DISCONTINUED | OUTPATIENT
Start: 2024-10-23 | End: 2024-10-24

## 2024-10-23 RX ADMIN — CARBIDOPA AND LEVODOPA 2 TABLET: 25; 100 TABLET, EXTENDED RELEASE ORAL at 17:49

## 2024-10-23 RX ADMIN — OXYBUTYNIN CHLORIDE 5 MG: 5 TABLET ORAL at 08:12

## 2024-10-23 RX ADMIN — LEVOTHYROXINE SODIUM 25 MCG: 25 TABLET ORAL at 05:25

## 2024-10-23 RX ADMIN — INSULIN GLARGINE 15 UNITS: 100 INJECTION, SOLUTION SUBCUTANEOUS at 23:42

## 2024-10-23 RX ADMIN — VALPROIC ACID 125 MG: 250 SOLUTION ORAL at 23:42

## 2024-10-23 RX ADMIN — PANTOPRAZOLE SODIUM 20 MG: 40 INJECTION, POWDER, FOR SOLUTION INTRAVENOUS at 08:11

## 2024-10-23 RX ADMIN — HEPARIN SODIUM 3200 UNITS: 1000 INJECTION INTRAVENOUS; SUBCUTANEOUS at 15:45

## 2024-10-23 RX ADMIN — CITALOPRAM HYDROBROMIDE 20 MG: 20 TABLET, FILM COATED ORAL at 08:12

## 2024-10-23 RX ADMIN — CEFTRIAXONE 1000 MG: 2 INJECTION, POWDER, FOR SOLUTION INTRAMUSCULAR; INTRAVENOUS at 16:03

## 2024-10-23 RX ADMIN — PRAVASTATIN SODIUM 40 MG: 40 TABLET ORAL at 17:49

## 2024-10-23 RX ADMIN — HEPARIN SODIUM 12 UNITS/KG/HR: 10000 INJECTION, SOLUTION INTRAVENOUS at 08:08

## 2024-10-23 RX ADMIN — ARIPIPRAZOLE 2 MG: 2 TABLET ORAL at 23:42

## 2024-10-23 RX ADMIN — VALPROIC ACID 125 MG: 250 SOLUTION ORAL at 08:11

## 2024-10-23 RX ADMIN — INSULIN LISPRO 2 UNITS: 100 INJECTION, SOLUTION INTRAVENOUS; SUBCUTANEOUS at 06:34

## 2024-10-23 RX ADMIN — CARBIDOPA AND LEVODOPA 2 TABLET: 25; 100 TABLET, EXTENDED RELEASE ORAL at 08:11

## 2024-10-23 RX ADMIN — ACETAMINOPHEN 650 MG: 325 TABLET ORAL at 08:12

## 2024-10-23 RX ADMIN — OXYBUTYNIN CHLORIDE 5 MG: 5 TABLET ORAL at 17:49

## 2024-10-23 RX ADMIN — SODIUM CHLORIDE, SODIUM GLUCONATE, SODIUM ACETATE, POTASSIUM CHLORIDE, MAGNESIUM CHLORIDE, SODIUM PHOSPHATE, DIBASIC, AND POTASSIUM PHOSPHATE 75 ML/HR: .53; .5; .37; .037; .03; .012; .00082 INJECTION, SOLUTION INTRAVENOUS at 13:25

## 2024-10-23 RX ADMIN — OXYCODONE HYDROCHLORIDE AND ACETAMINOPHEN 1000 MG: 500 TABLET ORAL at 08:11

## 2024-10-23 RX ADMIN — ACETAMINOPHEN 650 MG: 325 TABLET ORAL at 23:42

## 2024-10-23 RX ADMIN — OXYBUTYNIN CHLORIDE 5 MG: 5 TABLET ORAL at 23:42

## 2024-10-23 RX ADMIN — INSULIN LISPRO 1 UNITS: 100 INJECTION, SOLUTION INTRAVENOUS; SUBCUTANEOUS at 12:05

## 2024-10-23 RX ADMIN — FERROUS SULFATE 300 MG: 300 SOLUTION ORAL at 08:12

## 2024-10-23 NOTE — PLAN OF CARE
Problem: Prexisting or High Potential for Compromised Skin Integrity  Goal: Skin integrity is maintained or improved  Description: INTERVENTIONS:  - Identify patients at risk for skin breakdown  - Assess and monitor skin integrity  - Assess and monitor nutrition and hydration status  - Monitor labs   - Assess for incontinence   - Turn and reposition patient  - Assist with mobility/ambulation  - Relieve pressure over bony prominences  - Avoid friction and shearing  - Provide appropriate hygiene as needed including keeping skin clean and dry  - Evaluate need for skin moisturizer/barrier cream  - Collaborate with interdisciplinary team   - Patient/family teaching  - Consider wound care consult   Outcome: Progressing     Problem: PAIN - ADULT  Goal: Verbalizes/displays adequate comfort level or baseline comfort level  Description: Interventions:  - Encourage patient to monitor pain and request assistance  - Assess pain using appropriate pain scale  - Administer analgesics based on type and severity of pain and evaluate response  - Implement non-pharmacological measures as appropriate and evaluate response  - Consider cultural and social influences on pain and pain management  - Notify physician/advanced practitioner if interventions unsuccessful or patient reports new pain  Outcome: Progressing     Problem: INFECTION - ADULT  Goal: Absence or prevention of progression during hospitalization  Description: INTERVENTIONS:  - Assess and monitor for signs and symptoms of infection  - Monitor lab/diagnostic results  - Monitor all insertion sites, i.e. indwelling lines, tubes, and drains  - Monitor endotracheal if appropriate and nasal secretions for changes in amount and color  - [place] appropriate cooling/warming therapies per order  - Administer medications as ordered  - Instruct and encourage patient and family to use good hand hygiene technique  - Identify and instruct in appropriate isolation precautions for  identified infection/condition  Outcome: Progressing  Goal: Absence of fever/infection during neutropenic period  Description: INTERVENTIONS:  - Monitor WBC    Outcome: Progressing     Problem: SAFETY ADULT  Goal: Patient will remain free of falls  Description: INTERVENTIONS:  - Educate patient/family on patient safety including physical limitations  - Instruct patient to call for assistance with activity   - Consult OT/PT to assist with strengthening/mobility   - Keep Call bell within reach  - Keep bed low and locked with side rails adjusted as appropriate  - Keep care items and personal belongings within reach  - Initiate and maintain comfort rounds  - Make Fall Risk Sign visible to staff  - Offer Toileting every 2 Hours, in advance of need  - Initiate/Maintain bed/chair alarm  - Obtain necessary fall risk management equipment  - Apply yellow socks and bracelet for high fall risk patients  - Consider moving patient to room near nurses station  Outcome: Progressing  Goal: Maintain or return to baseline ADL function  Description: INTERVENTIONS:  -  Assess patient's ability to carry out ADLs; assess patient's baseline for ADL function and identify physical deficits which impact ability to perform ADLs (bathing, care of mouth/teeth, toileting, grooming, dressing, etc.)  - Assess/evaluate cause of self-care deficits   - Assess range of motion  - Assess patient's mobility; develop plan if impaired  - Assess patient's need for assistive devices and provide as appropriate  - Encourage maximum independence but intervene and supervise when necessary  - Involve family in performance of ADLs  - Assess for home care needs following discharge   - Consider OT consult to assist with ADL evaluation and planning for discharge  - Provide patient education as appropriate  Outcome: Progressing  Goal: Maintains/Returns to pre admission functional level  Description: INTERVENTIONS:  - Perform AM-PAC 6 Click Basic Mobility/ Daily  Activity assessment daily.  - Set and communicate daily mobility goal to care team and patient/family/caregiver.   - Collaborate with rehabilitation services on mobility goals if consulted  - Perform Range of Motion 3 times a day.  - Reposition patient every 2 hours.  - Dangle patient 3 times a day  - Stand patient 3 times a day  - Ambulate patient 3 times a day  - Out of bed to chair 3 times a day   - Out of bed for meals 3 times a day  - Out of bed for toileting  - Record patient progress and toleration of activity level   Outcome: Progressing     Problem: DISCHARGE PLANNING  Goal: Discharge to home or other facility with appropriate resources  Description: INTERVENTIONS:  - Identify barriers to discharge w/patient and caregiver  - Arrange for needed discharge resources and transportation as appropriate  - Identify discharge learning needs (meds, wound care, etc.)  - Arrange for interpretive services to assist at discharge as needed  - Refer to Case Management Department for coordinating discharge planning if the patient needs post-hospital services based on physician/advanced practitioner order or complex needs related to functional status, cognitive ability, or social support system  Outcome: Progressing     Problem: Knowledge Deficit  Goal: Patient/family/caregiver demonstrates understanding of disease process, treatment plan, medications, and discharge instructions  Description: Complete learning assessment and assess knowledge base.  Interventions:  - Provide teaching at level of understanding  - Provide teaching via preferred learning methods  Outcome: Progressing     Problem: Nutrition/Hydration-ADULT  Goal: Nutrient/Hydration intake appropriate for improving, restoring or maintaining nutritional needs  Description: Monitor and assess patient's nutrition/hydration status for malnutrition. Collaborate with interdisciplinary team and initiate plan and interventions as ordered.  Monitor patient's weight and  dietary intake as ordered or per policy. Utilize nutrition screening tool and intervene as necessary. Determine patient's food preferences and provide high-protein, high-caloric foods as appropriate.     INTERVENTIONS:  - Monitor oral intake, urinary output, labs, and treatment plans  - Assess nutrition and hydration status and recommend course of action  - Evaluate amount of meals eaten  - Assist patient with eating if necessary   - Allow adequate time for meals  - Recommend/ encourage appropriate diets, oral nutritional supplements, and vitamin/mineral supplements  - Order, calculate, and assess calorie counts as needed  - Recommend, monitor, and adjust tube feedings and TPN/PPN based on assessed needs  - Assess need for intravenous fluids  - Provide specific nutrition/hydration education as appropriate  - Include patient/family/caregiver in decisions related to nutrition  Outcome: Progressing

## 2024-10-23 NOTE — ASSESSMENT & PLAN NOTE
Lab Results   Component Value Date    HGBA1C 6.2 (H) 08/13/2024       Recent Labs     10/22/24  2351 10/23/24  0630 10/23/24  0655 10/23/24  1149   POCGLU 275* 287* 282* 194*       Blood Sugar Average: Last 72 hrs:  (P) 273.32  Hold home regimen while inpatient and resume on discharge NovoLog 4 units at 6 PM and midnight while tube feeds are running and sliding scale  Goal -180 while admitted, adjusting insulin regimen as appropriate  10/22 Glucose levels elevated overnight, requiring insulin gtt    Plan  Increased to Lantus to 15 units  Continue sliding scale Algorithm 1 q6h

## 2024-10-23 NOTE — ASSESSMENT & PLAN NOTE
Recent Labs     10/21/24  0525 10/22/24  0506 10/23/24  0520   CALCIUM 9.6 9.7 8.5   ALB 3.7 3.7 3.1*   CORRECTEDCA  --   --  9.2     Presented with abnormal lab work showing hypercalcemia of 14.2, repeat CMP showed calcium of 14.8  Per care nurse her mental status was abnormal day prior to admission, but it improved before arrival to ER  Corrected calcium: 14.6, iCal: 1.68, TSH: 0.8  Nephrology was reach out to in the ED recommended calcitonin  Etiology: Unclear right now, possibly from severe dehydration/possible malignancy considering multiple intermediate nodular densities in right breast(reflecting trauma/fat necrosis) in CT scan in 9/2024  Resolved s/p calcitonin    Plan:  PTHrp pending  Nephrology consulted, appreciate recommendations  SPEP/UPEP/FLC ratio pending

## 2024-10-23 NOTE — ASSESSMENT & PLAN NOTE
Hemoglobin 7.9 today  Status post IV Venofer 300 mg x 1 on 10/21  Ferritin 116/iron saturation 19%  Transfuse for hemoglobin less than 7

## 2024-10-23 NOTE — PROGRESS NOTES
NEPHROLOGY HOSPITAL PROGRESS NOTE   Terrie Alicea 64 y.o. 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)  Calcium level 8.5, corrected calcium 9.2 ionized calcium 1.08 today  Status post calcitonin x 4 doses  Continue free water flushes (increase to 200 cc every 4 hours)  Monitor for hypocalcemia  Trend calcium level daily  SCAR (acute kidney injury) (HCC)  Etiology: Likely secondary to vasoconstriction and hemodynamic changes in setting of hypercalcemia leading to ATN, now having insensible losses in setting of ongoing fevers  Baseline creatinine 0.8  Creatinine on admission 2.83  Creatinine edith 2.26 on 10/21  Creatinine increased to 2.31 on 10/22  Creatinine today improved to 2.08 after IV fluid challenge  UA: No hematuria  Renal imaging: No hydronephrosis  Continue free water flushes  Give Isolyte 75 cc/h for 10 hours due to ongoing fevers and insensible losses  Cerebral palsy (HCC)  Management per primary team  Diabetes mellitus type 2, insulin dependent (HCC)  Management per primary team  Control hyperglycemia as this can cause volume depletion and worsening of SCAR  Iron deficiency anemia  Hemoglobin 7.9 today  Status post IV Venofer 300 mg x 1 on 10/21  Ferritin 116/iron saturation 19%  Transfuse for hemoglobin less than 7  Metabolic alkalosis  Currently resolved, serum bicarbonate level today 22  Hypophosphatemia  Serum phosphorus level improved to .3.3 today  No need for phosphate supplementation today  Acute hypoxic respiratory failure (HCC)  Chest x-ray 10/21 showing faint inferior perihilar patchiness with subtle obscuration of right cardiomediastinal silhouette  could represent findings of aspiration, no evidence of pulmonary edema  Chest x-ray today with perihilar opacities may be due to atelectasis versus pneumonia  Primary team has a concern for PE  Follow-up VQ scan  Currently on empiric IV heparin for PE  Sepsis (HCC)  Patient has been having fevers  COVID/influenza negative  RSV negative and urine culture is pending  Blood cultures are pending    Discussed with internal medicine team.  After discussion, we agreed that kidney function is better today with administration of IV fluids and given ongoing fevers and insensible losses, give limited dose of IV fluids again today.    SUBJECTIVE / 24H INTERVAL HISTORY:  Urine output not recorded.  Patient is nonverbal.    OBJECTIVE:  Current Weight: Weight - Scale: 40 kg (88 lb 2.9 oz)  Vitals:    10/23/24 0736 10/23/24 0809 10/23/24 0906 10/23/24 1023   BP: 107/71      Pulse: 101 96 94 90   Resp:       Temp: (!) 102.7 °F (39.3 °C) (!) 101.4 °F (38.6 °C) 99.8 °F (37.7 °C) 99.8 °F (37.7 °C)   TempSrc: Oral      SpO2: 90% 92% 91% 94%   Weight:         No intake or output data in the 24 hours ending 10/23/24 1149    Review of Systems   Unable to perform ROS: Patient nonverbal     Physical Exam  Vitals and nursing note reviewed.   Constitutional:       General: She is not in acute distress.     Appearance: She is well-developed.   HENT:      Head: Normocephalic and atraumatic.   Eyes:      Conjunctiva/sclera: Conjunctivae normal.   Cardiovascular:      Rate and Rhythm: Normal rate and regular rhythm.      Pulses: Normal pulses.      Heart sounds: Normal heart sounds. No murmur heard.  Pulmonary:      Effort: Pulmonary effort is normal. No respiratory distress.      Breath sounds: Normal breath sounds.   Abdominal:      Palpations: Abdomen is soft.      Tenderness: There is no abdominal tenderness.   Musculoskeletal:         General: No swelling.      Cervical back: Neck supple.      Right lower leg: No edema.      Left lower leg:  No edema.   Skin:     General: Skin is warm and dry.   Neurological:      Mental Status: She is alert. Mental status is at baseline.   Psychiatric:         Mood and Affect: Mood normal.       Medications:    Current Facility-Administered Medications:     acetaminophen (TYLENOL) tablet 650 mg, 650 mg, Oral, Q6H PRN, Nicolas Lopez DO, 650 mg at 10/23/24 0812    ARIPiprazole (ABILIFY) tablet 2 mg, 2 mg, Per G Tube, HS, Leonidas Sharma MD, 2 mg at 10/22/24 2357    ascorbic acid (VITAMIN C) tablet 1,000 mg, 1,000 mg, Per G Tube, Daily, Leonidas Sharma MD, 1,000 mg at 10/23/24 0811    baclofen tablet 10 mg, 10 mg, Oral, TID PRN, Jennifer Hernandez MD    carbidopa-levodopa (SINEMET CR)  mg per ER tablet 2 tablet, 2 tablet, Oral, BID, Leonidas Sharma MD, 2 tablet at 10/23/24 0811    ceftriaxone (ROCEPHIN) 1 g/50 mL in dextrose IVPB, 1,000 mg, Intravenous, Q24H, Keiry Ricardo MD, Last Rate: 100 mL/hr at 10/22/24 1721, 1,000 mg at 10/22/24 1721    citalopram (CeleXA) tablet 20 mg, 20 mg, Per G Tube, Daily, Leonidas Sharma MD, 20 mg at 10/23/24 0812    Ferrous Sulfate oral syrup 300 mg, 300 mg, Per G Tube, Every Other Day, Leonidas Sharma MD, 300 mg at 10/23/24 0812    heparin (porcine) 25,000 units in 0.45% NaCl 250 mL infusion (premix), 3-30 Units/kg/hr (Order-Specific), Intravenous, Titrated, Jennifer Hernandez MD, Last Rate: 4.8 mL/hr at 10/23/24 0808, 12 Units/kg/hr at 10/23/24 0808    heparin (porcine) injection 1,600 Units, 1,600 Units, Intravenous, Q6H PRN, Jennifer Hernandez MD    heparin (porcine) injection 3,200 Units, 3,200 Units, Intravenous, Q6H PRN, Jennifer Hernandez MD, 3,200 Units at 10/22/24 1326    influenza vaccine, recombinant (PF) (Flublok) IM injection 0.5 mL, 0.5 mL, Intramuscular, Once, Nicolas Lopez DO    insulin glargine (LANTUS) subcutaneous injection 15 Units 0.15 mL, 15 Units, Subcutaneous, HS, Jennifer Hernandez MD    insulin lispro (HumALOG/ADMELOG) 100 units/mL subcutaneous injection 1-5  Units, 1-5 Units, Subcutaneous, Q6H ROSEMARY, 2 Units at 10/23/24 0634 **AND** Fingerstick Glucose (POCT), , , Q6H, Leonidas Sharma MD    Ketotifen Fumarate (ZADITOR) 0.035 % ophthalmic solution 1 drop, 1 drop, Both Eyes, BID PRN, Leonidas Sharma MD    levothyroxine tablet 25 mcg, 25 mcg, Per G Tube, Early Morning, Leonidas Sharma MD, 25 mcg at 10/23/24 0525    midodrine (PROAMATINE) tablet 5 mg, 5 mg, Per G Tube, TID PRN, Leonidas Sharma MD    oxybutynin (DITROPAN) tablet 5 mg, 5 mg, Per G Tube, TID, Leonidas Sharma MD, 5 mg at 10/23/24 0812    pantoprazole (PROTONIX) injection 20 mg, 20 mg, Intravenous, Q24H ROSEMARY, Leonidas Sharma MD, 20 mg at 10/23/24 0811    polyethylene glycol (MIRALAX) packet 17 g, 17 g, Per G Tube, Daily PRN, Leonidas Sharma MD    pravastatin (PRAVACHOL) tablet 40 mg, 40 mg, Per G Tube, Daily With Dinner, Leonidas Sharma MD, 40 mg at 10/22/24 1552    sodium chloride 0.9 % bolus 1,000 mL, 1,000 mL, Intravenous, Once, Leonidas Sharma MD    traZODone (DESYREL) tablet 100 mg, 100 mg, Per G Tube, HS PRN, Jennifer Hernandez MD    valproic acid (DEPAKENE) oral soln 125 mg, 125 mg, Per G Tube, BID, Leonidas Sharma MD, 125 mg at 10/23/24 0811    Laboratory Results:  Results from last 7 days   Lab Units 10/23/24  0520 10/22/24  0506 10/21/24  0525 10/20/24  0532 10/19/24  0436 10/19/24  0037 10/18/24  1959 10/18/24  1518   WBC Thousand/uL 11.50* 6.26 5.82  --  6.25  --  6.82 6.90   HEMOGLOBIN g/dL 7.9* 9.9* 9.1*  --  9.4*  --  10.4* 11.0*   HEMATOCRIT % 25.2* 32.0* 29.1*  --  29.9*  --  32.4* 34.4*   PLATELETS Thousands/uL 131* 151 127*  --  145*  --  156 173   POTASSIUM mmol/L 4.7 3.9 4.6 4.6 4.3  --  4.5 4.6   CHLORIDE mmol/L 110* 112* 108 108 109*  --  99 98   CO2 mmol/L 22 22 27 29 28  --  36* 36*   BUN mg/dL 67* 60* 52* 50* 61*  --  70* 72*   CREATININE mg/dL 2.08* 2.31* 2.26* 2.49* 2.62*  --  2.86* 2.83*   CALCIUM mg/dL 8.5 9.7 9.6 10.1 12.4* 13.5* 14.8* 14.2*   MAGNESIUM mg/dL 2.1 2.0 1.9 2.1  --   --   --   --   "  PHOSPHORUS mg/dL 3.3 1.1* 2.5 3.2  --   --   --   --        Portions of the record may have been created with voice recognition software. Occasional wrong word or \"sound a like\" substitutions may have occurred due to the inherent limitations of voice recognition software. Read the chart carefully and recognize, using context, where substitutions have occurred.If you have any questions, please contact the dictating provider.    "

## 2024-10-23 NOTE — PROGRESS NOTES
Progress Note - Hospitalist   Name: Terrie Alicea 64 y.o. female I MRN: [id number]  Unit/Bed#: S -01 I Date of Admission: 10/18/2024   Date of Service: 10/23/2024 I Hospital Day: 5    Assessment & Plan  Acute hypoxic respiratory failure (HCC)  10/21 Patient requiring up to 6L/min O2 to maintain saturations > 90%.   10/21 CXR - Faint inferior perihilar patchiness with subtle obscuration of the right cardiomediastinal silhouette could represent findings of aspiration in the appropriate clinical context.  10/22 Fever overnight. Intermittent shallow breathing noted. Lung examination clear    Plan  Maintain supplemental O2 to SpO2 > 92%, titrate down as able  Defer CTA chest PE study given SCAR  Ventilation/perfusion lung scan pending  Will empirically treat with heparin gtt  Sepsis (HCC)  10/21 Met SIRS criteria with fever, tachycardia  10/21 CXR - Faint inferior perihilar patchiness with subtle obscuration of the right cardiomediastinal silhouette could represent findings of aspiration in the appropriate clinical context.  Urinalysis - innumerable leukocytes and moderate bacteria on microscopy  Urine cx > [id number] cfu GNR  Elevated procalcitonin, bandemia    Plan  Continue IV Rocephin  Patient continues to have recurrent fevers. Will obtain CT CAP  Follow urine culture  Follow blood cultures  Monitor fever curve  UTI (urinary tract infection)  See plan for sepsis  SCAR (acute kidney injury) (HCC)  Recent Labs     10/21/24  0525 10/22/24  0506 10/23/24  0520   CREATININE 2.26* 2.31* 2.08*   EGFR 22 21 24     Estimated Creatinine Clearance: 17.7 mL/min (A) (by C-G formula based on SCr of 2.08 mg/dL (H)).    POA 2.83; (baseline 0.8-1)  US kidney and bladder - Bilateral small echogenic kidneys of medical renal disease.   Per Nephrology, likely secondary to vasoconstriction and hemodynamic changes in setting of hypercalcemia     Plan:  Nephrology consulted, appreciate recommendations  Fluids 75 cc/hr for 10 hours  Free  water flushes  Monitor I/Os  Avoid nephrotoxins  Avoid hypoperfusion  Diabetes mellitus type 2, insulin dependent (HCC)  Lab Results   Component Value Date    HGBA1C 6.2 (H) 08/13/2024       Recent Labs     10/22/24  2351 10/23/24  0630 10/23/24  0655 10/23/24  1149   POCGLU 275* 287* 282* 194*       Blood Sugar Average: Last 72 hrs:  (P) 273.32  Hold home regimen while inpatient and resume on discharge NovoLog 4 units at 6 PM and midnight while tube feeds are running and sliding scale  Goal -180 while admitted, adjusting insulin regimen as appropriate  10/22 Glucose levels elevated overnight, requiring insulin gtt    Plan  Increased to Lantus to 15 units  Continue sliding scale Algorithm 1 q6h  Hypercalcemia  Recent Labs     10/21/24  0525 10/22/24  0506 10/23/24  0520   CALCIUM 9.6 9.7 8.5   ALB 3.7 3.7 3.1*   CORRECTEDCA  --   --  9.2     Presented with abnormal lab work showing hypercalcemia of 14.2, repeat CMP showed calcium of 14.8  Per care nurse her mental status was abnormal day prior to admission, but it improved before arrival to ER  Corrected calcium: 14.6, iCal: 1.68, TSH: 0.8  Nephrology was reach out to in the ED recommended calcitonin  Etiology: Unclear right now, possibly from severe dehydration/possible malignancy considering multiple intermediate nodular densities in right breast(reflecting trauma/fat necrosis) in CT scan in 9/2024  Resolved s/p calcitonin    Plan:  PTHrp pending  Nephrology consulted, appreciate recommendations  SPEP/UPEP/FLC ratio pending  Cerebral palsy (HCC)  At baseline is non verbal , totally dependent on care, resides in intermediate care facility  Continue PTA baclofen as TID prn for muscle spasms  Bipolar disorder (HCC)  Continue PTA Depakene and Abilify  Iron deficiency anemia  Recent Labs     10/21/24  0525 10/22/24  0506 10/23/24  0520   HGB 9.1* 9.9* 7.9*   Baseline: 9-11, currently hemoglobin is baseline  Ordered iron studies  Daily CBC  Continue PTA ferrous  sulfate  Transfuse if hemoglobin less than 7  Hypothyroidism  Continue PTA levothyroxine  TSH and T4 within normal limits  Hypophosphatemia  Recent Labs     10/21/24  0525 10/22/24  0506 10/23/24  0520   PHOS 2.5 1.1* 3.3     Replete as needed    VTE Pharmacologic Prophylaxis: VTE Score: 4 Moderate Risk (Score 3-4) - Pharmacological DVT Prophylaxis Ordered: heparin drip.    Mobility:   Basic Mobility Inpatient Raw Score: 6  JH-HLM Goal: 2: Bed activities/Dependent transfer  JH-HLM Achieved: 2: Bed activities/Dependent transfer  JH-HLM Goal achieved. Continue to encourage appropriate mobility.    Patient Centered Rounds: I performed bedside rounds with nursing staff today.   Discussions with Specialists or Other Care Team Provider: Attending, Nephrology    Education and Discussions with Family / Patient: Updated  (group home caregiver Lisa) via phone.    Current Length of Stay: 5 day(s)  Current Patient Status: Inpatient   Certification Statement: The patient will continue to require additional inpatient hospital stay due to recurrent fever, SCAR  Discharge Plan: Anticipate discharge in >72 hrs to [facility].    Code Status: Level 1 - Full Code    Subjective   Patient was seen and examined at bedside this morning. She is saturating > 92% on 2L/min. History and ROS limited by baseline nonverbal status.    Objective :  Temp:  [98.7 °F (37.1 °C)-102.7 °F (39.3 °C)] 99.4 °F (37.4 °C)  HR:  [] 92  BP: ()/(58-84) 107/71  Resp:  [16-18] 16  SpO2:  [89 %-96 %] 91 %    Body mass index is 17.81 kg/m².     Input and Output Summary (last 24 hours):   No intake or output data in the 24 hours ending 10/23/24 1336    Physical Exam  Vitals and nursing note reviewed.   Constitutional:       General: She is not in acute distress.     Appearance: She is well-developed. She is ill-appearing.      Comments: Nonverbal at baseline   HENT:      Head: Normocephalic and atraumatic.      Mouth/Throat:      Comments:  Dry lips  Eyes:      Conjunctiva/sclera: Conjunctivae normal.   Cardiovascular:      Rate and Rhythm: Normal rate and regular rhythm.      Heart sounds: No murmur heard.  Pulmonary:      Effort: Pulmonary effort is normal. No respiratory distress.      Breath sounds: Normal breath sounds.   Abdominal:      Palpations: Abdomen is soft.      Tenderness: There is no abdominal tenderness.   Musculoskeletal:         General: No swelling.      Cervical back: Neck supple.   Skin:     General: Skin is warm and dry.   Neurological:      Mental Status: She is alert. Mental status is at baseline.   Psychiatric:         Mood and Affect: Mood normal.       Lines/Drains:              Lab Results: I have reviewed the following results:   Results from last 7 days   Lab Units 10/23/24  0520 10/19/24  0436 10/18/24  1959   WBC Thousand/uL 11.50*   < > 6.82   HEMOGLOBIN g/dL 7.9*   < > 10.4*   HEMATOCRIT % 25.2*   < > 32.4*   PLATELETS Thousands/uL 131*   < > 156   BANDS PCT % 14*  --   --    SEGS PCT %  --   --  68   LYMPHO PCT % 16  --  21   MONO PCT % 3*  --  10   EOS PCT % 3  --  1    < > = values in this interval not displayed.     Results from last 7 days   Lab Units 10/23/24  0520 10/20/24  0532 10/19/24  0436   SODIUM mmol/L 144   < > 145   POTASSIUM mmol/L 4.7   < > 4.3   CHLORIDE mmol/L 110*   < > 109*   CO2 mmol/L 22   < > 28   BUN mg/dL 67*   < > 61*   CREATININE mg/dL 2.08*   < > 2.62*   ANION GAP mmol/L 12   < > 8   CALCIUM mg/dL 8.5   < > 12.4*   ALBUMIN g/dL 3.1*   < > 3.7   TOTAL BILIRUBIN mg/dL  --   --  0.36   ALK PHOS U/L  --   --  47   ALT U/L  --   --  <3*   AST U/L  --   --  3*   GLUCOSE RANDOM mg/dL 323*   < > 146*    < > = values in this interval not displayed.     Results from last 7 days   Lab Units 10/22/24  1209   INR  0.98     Results from last 7 days   Lab Units 10/23/24  1149 10/23/24  0655 10/23/24  0630 10/22/24  2351 10/22/24  1745 10/22/24  1552 10/22/24  1403 10/22/24  1151 10/22/24  1007  10/22/24  0821 10/22/24  0600 10/22/24  0419   POC GLUCOSE mg/dl 194* 282* 287* 275* 213* 190* 179* 109 100 108 258* 254*         Results from last 7 days   Lab Units 10/22/24  0506   PROCALCITONIN ng/ml 4.79*       Recent Cultures (last 7 days):   Results from last 7 days   Lab Units 10/22/24  1046 10/22/24  0902   BLOOD CULTURE   --  Received in Microbiology Lab. Culture in Progress.  Received in Microbiology Lab. Culture in Progress.   URINE CULTURE  >100,000 cfu/ml Gram Negative Hakeem*  --              Last 24 Hours Medication List:     Current Facility-Administered Medications:     acetaminophen (TYLENOL) tablet 650 mg, Q6H PRN    ARIPiprazole (ABILIFY) tablet 2 mg, HS    ascorbic acid (VITAMIN C) tablet 1,000 mg, Daily    baclofen tablet 10 mg, TID PRN    carbidopa-levodopa (SINEMET CR)  mg per ER tablet 2 tablet, BID    ceftriaxone (ROCEPHIN) 1 g/50 mL in dextrose IVPB, Q24H, Last Rate: 1,000 mg (10/22/24 1721)    citalopram (CeleXA) tablet 20 mg, Daily    Ferrous Sulfate oral syrup 300 mg, Every Other Day    heparin (porcine) 25,000 units in 0.45% NaCl 250 mL infusion (premix), Titrated, Last Rate: 12 Units/kg/hr (10/23/24 0808)    heparin (porcine) injection 1,600 Units, Q6H PRN    heparin (porcine) injection 3,200 Units, Q6H PRN    influenza vaccine, recombinant (PF) (Flublok) IM injection 0.5 mL, Once    insulin glargine (LANTUS) subcutaneous injection 15 Units 0.15 mL, HS    insulin lispro (HumALOG/ADMELOG) 100 units/mL subcutaneous injection 1-5 Units, Q6H ROSEMARY **AND** Fingerstick Glucose (POCT), Q6H    Ketotifen Fumarate (ZADITOR) 0.035 % ophthalmic solution 1 drop, BID PRN    levothyroxine tablet 25 mcg, Early Morning    midodrine (PROAMATINE) tablet 5 mg, TID PRN    multi-electrolyte (PLASMALYTE-A/ISOLYTE-S PH 7.4) IV solution, Continuous, Last Rate: 75 mL/hr (10/23/24 1325)    oxybutynin (DITROPAN) tablet 5 mg, TID    pantoprazole (PROTONIX) injection 20 mg, Q24H ROSEMARY    polyethylene glycol  (MIRALAX) packet 17 g, Daily PRN    pravastatin (PRAVACHOL) tablet 40 mg, Daily With Dinner    sodium chloride 0.9 % bolus 1,000 mL, Once    traZODone (DESYREL) tablet 100 mg, HS PRN    valproic acid (DEPAKENE) oral soln 125 mg, BID    Administrative Statements   Today, Patient Was Seen By: Keiry Ricardo MD      **Please Note: This note may have been constructed using a voice recognition system.**

## 2024-10-23 NOTE — ASSESSMENT & PLAN NOTE
Chest x-ray 10/21 showing faint inferior perihilar patchiness with subtle obscuration of right cardiomediastinal silhouette could represent findings of aspiration, no evidence of pulmonary edema  Chest x-ray today with perihilar opacities may be due to atelectasis versus pneumonia  Primary team has a concern for PE  Follow-up VQ scan  Currently on empiric IV heparin for PE

## 2024-10-23 NOTE — TELEPHONE ENCOUNTER
Caller: Lisa [facility]    Doctor and/or Office: Dr. Eubanks    #: [phone]    Escalation: Appointment Calling on behalf of patient Terrie. Her appt was canceled because of being hospitalized. Lisa is bringing 5 patients to appts with Dr. Eubanks on Nov 20th and she is wondering if Terrie can be added to the schedule that day too. She will be bringing 2 patients at 8 am and 3 at 1245. Either time works for them. I let her know he was fully booked but she asked me to send a message. Thank you

## 2024-10-23 NOTE — ASSESSMENT & PLAN NOTE
Patient has been having fevers  COVID/influenza negative  RSV negative and urine culture is pending  Blood cultures are pending

## 2024-10-23 NOTE — ASSESSMENT & PLAN NOTE
Etiology: Likely secondary to vasoconstriction and hemodynamic changes in setting of hypercalcemia leading to ATN, now having insensible losses in setting of ongoing fevers  Baseline creatinine 0.8  Creatinine on admission 2.83  Creatinine edith 2.26 on 10/21  Creatinine increased to 2.31 on 10/22  Creatinine today improved to 2.08 after IV fluid challenge  UA: No hematuria  Renal imaging: No hydronephrosis  Continue free water flushes  Give Isolyte 75 cc/h for 10 hours due to ongoing fevers and insensible losses

## 2024-10-23 NOTE — ASSESSMENT & PLAN NOTE
10/21 Patient requiring up to 6L/min O2 to maintain saturations > 90%.   10/21 CXR - Faint inferior perihilar patchiness with subtle obscuration of the right cardiomediastinal silhouette could represent findings of aspiration in the appropriate clinical context.  10/22 Fever overnight. Intermittent shallow breathing noted. Lung examination clear    Plan  Maintain supplemental O2 to SpO2 > 92%, titrate down as able  Defer CTA chest PE study given SCAR  Ventilation/perfusion lung scan pending  Will empirically treat with heparin gtt

## 2024-10-23 NOTE — ASSESSMENT & PLAN NOTE
Recent Labs     10/21/24  0525 10/22/24  0506 10/23/24  0520   HGB 9.1* 9.9* 7.9*   Baseline: 9-11, currently hemoglobin is baseline  Ordered iron studies  Daily CBC  Continue PTA ferrous sulfate  Transfuse if hemoglobin less than 7

## 2024-10-23 NOTE — ASSESSMENT & PLAN NOTE
Recent Labs     10/21/24  0525 10/22/24  0506 10/23/24  0520   PHOS 2.5 1.1* 3.3     Replete as needed

## 2024-10-23 NOTE — ASSESSMENT & PLAN NOTE
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

## 2024-10-23 NOTE — ASSESSMENT & PLAN NOTE
10/21 Met SIRS criteria with fever, tachycardia  10/21 CXR - Faint inferior perihilar patchiness with subtle obscuration of the right cardiomediastinal silhouette could represent findings of aspiration in the appropriate clinical context.  Urinalysis - innumerable leukocytes and moderate bacteria on microscopy  Urine cx > [id number] cfu GNR  Elevated procalcitonin, bandemia    Plan  Continue IV Rocephin  Patient continues to have recurrent fevers. Will obtain CT CAP  Follow urine culture  Follow blood cultures  Monitor fever curve

## 2024-10-23 NOTE — ASSESSMENT & PLAN NOTE
Recent Labs     10/21/24  0525 10/22/24  0506 10/23/24  0520   CREATININE 2.26* 2.31* 2.08*   EGFR 22 21 24     Estimated Creatinine Clearance: 17.7 mL/min (A) (by C-G formula based on SCr of 2.08 mg/dL (H)).    POA 2.83; (baseline 0.8-1)  US kidney and bladder - Bilateral small echogenic kidneys of medical renal disease.   Per Nephrology, likely secondary to vasoconstriction and hemodynamic changes in setting of hypercalcemia     Plan:  Nephrology consulted, appreciate recommendations  Fluids 75 cc/hr for 10 hours  Free water flushes  Monitor I/Os  Avoid nephrotoxins  Avoid hypoperfusion

## 2024-10-24 ENCOUNTER — APPOINTMENT (INPATIENT)
Dept: CT IMAGING | Facility: HOSPITAL | Age: 64
DRG: 640 | End: 2024-10-24
Payer: MEDICARE

## 2024-10-24 LAB
ANION GAP SERPL CALCULATED.3IONS-SCNC: 8 MMOL/L (ref 4–13)
APTT PPP: 60 SECONDS (ref 23–34)
APTT PPP: 63 SECONDS (ref 23–34)
BACTERIA UR CULT: ABNORMAL
BASOPHILS # BLD MANUAL: 0 THOUSAND/UL (ref 0–0.1)
BASOPHILS NFR MAR MANUAL: 0 % (ref 0–1)
BUN SERPL-MCNC: 49 MG/DL (ref 5–25)
CALCIUM SERPL-MCNC: 8.4 MG/DL (ref 8.4–10.2)
CHLORIDE SERPL-SCNC: 113 MMOL/L (ref 96–108)
CO2 SERPL-SCNC: 26 MMOL/L (ref 21–32)
CREAT SERPL-MCNC: 1.57 MG/DL (ref 0.6–1.3)
EOSINOPHIL # BLD MANUAL: 0 THOUSAND/UL (ref 0–0.4)
EOSINOPHIL NFR BLD MANUAL: 0 % (ref 0–6)
ERYTHROCYTE [DISTWIDTH] IN BLOOD BY AUTOMATED COUNT: 13.3 % (ref 11.6–15.1)
GFR SERPL CREATININE-BSD FRML MDRD: 35 ML/MIN/1.73SQ M
GLUCOSE SERPL-MCNC: 132 MG/DL (ref 65–140)
GLUCOSE SERPL-MCNC: 142 MG/DL (ref 65–140)
GLUCOSE SERPL-MCNC: 154 MG/DL (ref 65–140)
GLUCOSE SERPL-MCNC: 193 MG/DL (ref 65–140)
GLUCOSE SERPL-MCNC: 193 MG/DL (ref 65–140)
GLUCOSE SERPL-MCNC: 203 MG/DL (ref 65–140)
GLUCOSE SERPL-MCNC: 217 MG/DL (ref 65–140)
GLUCOSE SERPL-MCNC: 262 MG/DL (ref 65–140)
GLUCOSE SERPL-MCNC: 67 MG/DL (ref 65–140)
GLUCOSE SERPL-MCNC: 73 MG/DL (ref 65–140)
HCT VFR BLD AUTO: 25.7 % (ref 34.8–46.1)
HGB BLD-MCNC: 8.1 G/DL (ref 11.5–15.4)
LYMPHOCYTES # BLD AUTO: 1.82 THOUSAND/UL (ref 0.6–4.47)
LYMPHOCYTES # BLD AUTO: 14 % (ref 14–44)
MCH RBC QN AUTO: 32.9 PG (ref 26.8–34.3)
MCHC RBC AUTO-ENTMCNC: 31.5 G/DL (ref 31.4–37.4)
MCV RBC AUTO: 105 FL (ref 82–98)
METAMYELOCYTE ABSOLUTE CT: 0.26 THOUSAND/UL (ref 0–0.1)
METAMYELOCYTES NFR BLD MANUAL: 2 % (ref 0–1)
MONOCYTES # BLD AUTO: 0.26 THOUSAND/UL (ref 0–1.22)
MONOCYTES NFR BLD: 2 % (ref 4–12)
MYELOCYTE ABSOLUTE CT: 0.13 THOUSAND/UL (ref 0–0.1)
MYELOCYTES NFR BLD MANUAL: 1 % (ref 0–1)
NEUTROPHILS # BLD MANUAL: 10.54 THOUSAND/UL (ref 1.85–7.62)
NEUTS BAND NFR BLD MANUAL: 15 % (ref 0–8)
NEUTS SEG NFR BLD AUTO: 66 % (ref 43–75)
NRBC BLD AUTO-RTO: 1 /100 WBC (ref 0–2)
PHOSPHATE SERPL-MCNC: 2 MG/DL (ref 2.3–4.1)
PLATELET # BLD AUTO: 175 THOUSANDS/UL (ref 149–390)
PLATELET BLD QL SMEAR: ADEQUATE
PMV BLD AUTO: 12.5 FL (ref 8.9–12.7)
POTASSIUM SERPL-SCNC: 3.9 MMOL/L (ref 3.5–5.3)
PROCALCITONIN SERPL-MCNC: 3.26 NG/ML
RBC # BLD AUTO: 2.46 MILLION/UL (ref 3.81–5.12)
RBC MORPH BLD: NORMAL
SODIUM SERPL-SCNC: 147 MMOL/L (ref 135–147)
WBC # BLD AUTO: 13.01 THOUSAND/UL (ref 4.31–10.16)

## 2024-10-24 PROCEDURE — 85007 BL SMEAR W/DIFF WBC COUNT: CPT

## 2024-10-24 PROCEDURE — 82948 REAGENT STRIP/BLOOD GLUCOSE: CPT

## 2024-10-24 PROCEDURE — 74176 CT ABD & PELVIS W/O CONTRAST: CPT

## 2024-10-24 PROCEDURE — 85730 THROMBOPLASTIN TIME PARTIAL: CPT | Performed by: INTERNAL MEDICINE

## 2024-10-24 PROCEDURE — 71250 CT THORAX DX C-: CPT

## 2024-10-24 PROCEDURE — 85027 COMPLETE CBC AUTOMATED: CPT

## 2024-10-24 PROCEDURE — 99223 1ST HOSP IP/OBS HIGH 75: CPT | Performed by: INTERNAL MEDICINE

## 2024-10-24 PROCEDURE — 99232 SBSQ HOSP IP/OBS MODERATE 35: CPT | Performed by: INTERNAL MEDICINE

## 2024-10-24 PROCEDURE — 84145 PROCALCITONIN (PCT): CPT

## 2024-10-24 PROCEDURE — 80048 BASIC METABOLIC PNL TOTAL CA: CPT

## 2024-10-24 PROCEDURE — 99233 SBSQ HOSP IP/OBS HIGH 50: CPT | Performed by: INTERNAL MEDICINE

## 2024-10-24 PROCEDURE — 84100 ASSAY OF PHOSPHORUS: CPT

## 2024-10-24 RX ORDER — INSULIN GLARGINE 100 [IU]/ML
20 INJECTION, SOLUTION SUBCUTANEOUS
Status: DISCONTINUED | OUTPATIENT
Start: 2024-10-24 | End: 2024-10-25

## 2024-10-24 RX ORDER — INSULIN GLARGINE 100 [IU]/ML
20 INJECTION, SOLUTION SUBCUTANEOUS
Status: DISCONTINUED | OUTPATIENT
Start: 2024-10-24 | End: 2024-10-24

## 2024-10-24 RX ORDER — AMOXICILLIN 250 MG
1 CAPSULE ORAL
Status: DISCONTINUED | OUTPATIENT
Start: 2024-10-24 | End: 2024-11-01

## 2024-10-24 RX ORDER — HEPARIN SODIUM 5000 [USP'U]/ML
5000 INJECTION, SOLUTION INTRAVENOUS; SUBCUTANEOUS EVERY 8 HOURS SCHEDULED
Status: DISCONTINUED | OUTPATIENT
Start: 2024-10-24 | End: 2024-10-29

## 2024-10-24 RX ORDER — DEXTROSE MONOHYDRATE 25 G/50ML
INJECTION, SOLUTION INTRAVENOUS
Status: DISPENSED
Start: 2024-10-24 | End: 2024-10-24

## 2024-10-24 RX ORDER — DEXTROSE MONOHYDRATE 25 G/50ML
25 INJECTION, SOLUTION INTRAVENOUS ONCE
Status: COMPLETED | OUTPATIENT
Start: 2024-10-24 | End: 2024-10-24

## 2024-10-24 RX ORDER — ACETAMINOPHEN 10 MG/ML
1000 INJECTION, SOLUTION INTRAVENOUS EVERY 6 HOURS PRN
Status: DISCONTINUED | OUTPATIENT
Start: 2024-10-24 | End: 2024-10-24

## 2024-10-24 RX ORDER — LORAZEPAM 0.5 MG/1
0.5 TABLET ORAL
Status: DISCONTINUED | OUTPATIENT
Start: 2024-10-24 | End: 2024-11-04 | Stop reason: HOSPADM

## 2024-10-24 RX ADMIN — LORAZEPAM 0.5 MG: 0.5 TABLET ORAL at 21:36

## 2024-10-24 RX ADMIN — INSULIN GLARGINE 20 UNITS: 100 INJECTION, SOLUTION SUBCUTANEOUS at 17:56

## 2024-10-24 RX ADMIN — ARIPIPRAZOLE 2 MG: 2 TABLET ORAL at 21:37

## 2024-10-24 RX ADMIN — OXYBUTYNIN CHLORIDE 5 MG: 5 TABLET ORAL at 21:36

## 2024-10-24 RX ADMIN — CARBIDOPA AND LEVODOPA 2 TABLET: 25; 100 TABLET, EXTENDED RELEASE ORAL at 17:34

## 2024-10-24 RX ADMIN — POTASSIUM PHOSPHATE, MONOBASIC AND POTASSIUM PHOSPHATE, DIBASIC 21 MMOL: 224; 236 INJECTION, SOLUTION, CONCENTRATE INTRAVENOUS at 09:48

## 2024-10-24 RX ADMIN — INSULIN LISPRO 1 UNITS: 100 INJECTION, SOLUTION INTRAVENOUS; SUBCUTANEOUS at 06:03

## 2024-10-24 RX ADMIN — CARBIDOPA AND LEVODOPA 2 TABLET: 25; 100 TABLET, EXTENDED RELEASE ORAL at 09:47

## 2024-10-24 RX ADMIN — ACETAMINOPHEN 650 MG: 325 TABLET ORAL at 05:38

## 2024-10-24 RX ADMIN — OXYBUTYNIN CHLORIDE 5 MG: 5 TABLET ORAL at 09:47

## 2024-10-24 RX ADMIN — Medication 600 MG: at 21:35

## 2024-10-24 RX ADMIN — PANTOPRAZOLE SODIUM 20 MG: 40 INJECTION, POWDER, FOR SOLUTION INTRAVENOUS at 09:48

## 2024-10-24 RX ADMIN — CITALOPRAM HYDROBROMIDE 20 MG: 20 TABLET, FILM COATED ORAL at 09:48

## 2024-10-24 RX ADMIN — OXYCODONE HYDROCHLORIDE AND ACETAMINOPHEN 1000 MG: 500 TABLET ORAL at 09:48

## 2024-10-24 RX ADMIN — HEPARIN SODIUM 5000 UNITS: 5000 INJECTION INTRAVENOUS; SUBCUTANEOUS at 21:36

## 2024-10-24 RX ADMIN — LEVOTHYROXINE SODIUM 25 MCG: 25 TABLET ORAL at 05:38

## 2024-10-24 RX ADMIN — INSULIN LISPRO 2 UNITS: 100 INJECTION, SOLUTION INTRAVENOUS; SUBCUTANEOUS at 00:13

## 2024-10-24 RX ADMIN — PRAVASTATIN SODIUM 40 MG: 40 TABLET ORAL at 17:34

## 2024-10-24 RX ADMIN — OXYBUTYNIN CHLORIDE 5 MG: 5 TABLET ORAL at 15:06

## 2024-10-24 RX ADMIN — DEXTROSE MONOHYDRATE 25 ML: 25 INJECTION, SOLUTION INTRAVENOUS at 10:03

## 2024-10-24 RX ADMIN — ACETAMINOPHEN 650 MG: 325 TABLET ORAL at 15:06

## 2024-10-24 RX ADMIN — CEFTRIAXONE 1000 MG: 2 INJECTION, POWDER, FOR SOLUTION INTRAMUSCULAR; INTRAVENOUS at 14:56

## 2024-10-24 RX ADMIN — VALPROIC ACID 125 MG: 250 SOLUTION ORAL at 21:35

## 2024-10-24 RX ADMIN — HEPARIN SODIUM 5000 UNITS: 5000 INJECTION INTRAVENOUS; SUBCUTANEOUS at 14:56

## 2024-10-24 RX ADMIN — SENNOSIDES AND DOCUSATE SODIUM 1 TABLET: 50; 8.6 TABLET ORAL at 21:36

## 2024-10-24 RX ADMIN — VALPROIC ACID 125 MG: 250 SOLUTION ORAL at 09:47

## 2024-10-24 NOTE — ASSESSMENT & PLAN NOTE
Recent Labs     10/22/24  0506 10/23/24  0520 10/24/24  0802   HGB 9.9* 7.9* 8.1*   Baseline: 9-11, currently hemoglobin is baseline  Ordered iron studies  Daily CBC  Continue PTA ferrous sulfate  Transfuse if hemoglobin less than 7

## 2024-10-24 NOTE — ASSESSMENT & PLAN NOTE
Recent Labs     10/22/24  0506 10/23/24  0520 10/24/24  0802   CREATININE 2.31* 2.08* 1.57*   EGFR 21 24 35     Estimated Creatinine Clearance: 23.5 mL/min (A) (by C-G formula based on SCr of 1.57 mg/dL (H)).    POA 2.83; (baseline 0.8-1)  US kidney and bladder - Bilateral small echogenic kidneys of medical renal disease.   Per Nephrology, likely secondary to vasoconstriction and hemodynamic changes in setting of hypercalcemia     Plan:  Nephrology consulted, appreciate recommendations  Free water flushes increased  Monitor I/Os  Avoid nephrotoxins  Avoid hypoperfusion

## 2024-10-24 NOTE — ASSESSMENT & PLAN NOTE
Recent Labs     10/22/24  0506 10/23/24  0520 10/24/24  0802   PHOS 1.1* 3.3 2.0*     Replete as needed

## 2024-10-24 NOTE — PROGRESS NOTES
NEPHROLOGY HOSPITAL PROGRESS NOTE   Terrie Alicea 64 y.o. 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)  Calcium level today 8.4  Status post calcitonin x 4 doses  Continue free water flushes, increase to 250 cc every 4 hours  Monitor for hypocalcemia  Trend calcium level daily  SCAR (acute kidney injury) (HCC)  Etiology: Likely secondary to vasoconstriction and hemodynamic changes in setting of hypercalcemia leading to ATN, now having insensible losses in setting of ongoing fevers  Baseline creatinine 0.8  Creatinine on admission 2.83  Creatinine edith 2.26 on 10/21  Creatinine increased to 2.31 on 10/22  Creatinine today improved to 1.57 after IV fluid challenge on 10/22 and stent/23  UA: No hematuria  Renal imaging: No hydronephrosis  Continue free water flushes, increase to 250 cc every 4 hours  Hold off further IV fluids today  Cerebral palsy (HCC)  Management per primary team  Diabetes mellitus type 2, insulin dependent (HCC)  Management per primary team  Control hyperglycemia as this can cause volume depletion and worsening of SCAR  Iron deficiency anemia  Hemoglobin 8.1 today  Status post IV Venofer 300 mg x 1 on 10/21  Ferritin 116/iron saturation 19%  Transfuse for hemoglobin less than 7  Metabolic alkalosis  Currently resolved, serum bicarbonate level 26 today  Hypophosphatemia  Serum phosphorus level improved to 2.0 today  Agree with IV phosphate supplementation  Acute hypoxic respiratory failure (HCC)  Chest x-ray 10/21 showing faint inferior perihilar patchiness with subtle obscuration of right cardiomediastinal silhouette could represent findings of aspiration,  no evidence of pulmonary edema  Chest x-ray 10/23 with perihilar opacities may be due to atelectasis versus pneumonia  VQ scan: Low probability of PE  CT chest pending today  Sepsis (HCC)  Patient has been having fevers  COVID/influenza negative  RSV negative and urine culture is pending  Blood cultures are pending  UTI (urinary tract infection)  Urine culture growing more than 100,000 Morganella  Currently on IV ceftriaxone per primary team    Discussed with internal medicine team.  After discussion, we agreed that kidney function continues to improve and to increase free water flushes 250 cc every 4 hours.    SUBJECTIVE / 24H INTERVAL HISTORY:  Urine output not recorded.  Patient is nonverbal.    OBJECTIVE:  Current Weight: Weight - Scale: 40 kg (88 lb 2.9 oz)  Vitals:    10/24/24 0535 10/24/24 0717 10/24/24 0758 10/24/24 1159   BP:   122/73    Pulse: 101 90 89    Resp:   16    Temp: (!) 102.9 °F (39.4 °C) (!) 101.9 °F (38.8 °C) (!) 101.5 °F (38.6 °C) 98.8 °F (37.1 °C)   TempSrc:    Axillary   SpO2: 94% 95% 95%    Weight:         No intake or output data in the 24 hours ending 10/24/24 1219    Review of Systems   Unable to perform ROS: Patient nonverbal     Physical Exam  Vitals and nursing note reviewed.   Constitutional:       General: She is not in acute distress.     Appearance: She is well-developed. She is ill-appearing.   HENT:      Head: Normocephalic and atraumatic.   Eyes:      Conjunctiva/sclera: Conjunctivae normal.   Cardiovascular:      Rate and Rhythm: Normal rate and regular rhythm.      Heart sounds: No murmur heard.  Pulmonary:      Effort: Pulmonary effort is normal. No respiratory distress.      Breath sounds: Normal breath sounds.   Abdominal:      Palpations: Abdomen is soft.      Tenderness: There is no abdominal tenderness.      Comments: PEG tube present   Musculoskeletal:         General: No swelling.      Cervical back: Neck supple.      Right lower leg: No edema.      Left lower leg: No  edema.   Skin:     General: Skin is warm and dry.      Capillary Refill: Capillary refill takes less than 2 seconds.   Neurological:      Mental Status: She is alert. Mental status is at baseline.   Psychiatric:         Mood and Affect: Mood normal.       Medications:    Current Facility-Administered Medications:     acetaminophen (TYLENOL) tablet 650 mg, 650 mg, Oral, Q6H PRN, Nicolas Lopez DO, 650 mg at 10/24/24 0538    ARIPiprazole (ABILIFY) tablet 2 mg, 2 mg, Per G Tube, HS, Leonidas Sharma MD, 2 mg at 10/23/24 2342    ascorbic acid (VITAMIN C) tablet 1,000 mg, 1,000 mg, Per G Tube, Daily, Leonidas Sharma MD, 1,000 mg at 10/24/24 0948    baclofen tablet 10 mg, 10 mg, Oral, TID PRN, Jennifer Hernandez MD    carbidopa-levodopa (SINEMET CR)  mg per ER tablet 2 tablet, 2 tablet, Oral, BID, Leonidas Sharma MD, 2 tablet at 10/24/24 0947    ceftriaxone (ROCEPHIN) 1 g/50 mL in dextrose IVPB, 1,000 mg, Intravenous, Q24H, Keiry Ricardo MD, Last Rate: 100 mL/hr at 10/23/24 1603, 1,000 mg at 10/23/24 1603    citalopram (CeleXA) tablet 20 mg, 20 mg, Per G Tube, Daily, Leonidas Sharma MD, 20 mg at 10/24/24 0948    dextrose 50 % IV solution **ADS Override Pull**, , , ,     Ferrous Sulfate oral syrup 300 mg, 300 mg, Per G Tube, Every Other Day, Leonidas Sharma MD, 300 mg at 10/23/24 0812    influenza vaccine, recombinant (PF) (Flublok) IM injection 0.5 mL, 0.5 mL, Intramuscular, Once, Nicolas Lopez DO    insulin glargine (LANTUS) subcutaneous injection 20 Units 0.2 mL, 20 Units, Subcutaneous, HS, Keiry Ricardo MD    insulin lispro (HumALOG/ADMELOG) 100 units/mL subcutaneous injection 1-5 Units, 1-5 Units, Subcutaneous, Q6H ROSEMARY, 1 Units at 10/24/24 0603 **AND** Fingerstick Glucose (POCT), , , Q6H, Leonidas Sharma MD    Ketotifen Fumarate (ZADITOR) 0.035 % ophthalmic solution 1 drop, 1 drop, Both Eyes, BID PRN, Leonidas Sharma MD    levothyroxine tablet 25 mcg, 25 mcg, Per G Tube, Early Morning, Leonidas Sharma MD, 25 mcg at 10/24/24 0582     midodrine (PROAMATINE) tablet 5 mg, 5 mg, Per G Tube, TID PRN, Leonidas Sharma MD    oxybutynin (DITROPAN) tablet 5 mg, 5 mg, Per G Tube, TID, Leonidas Sharma MD, 5 mg at 10/24/24 0947    pantoprazole (PROTONIX) injection 20 mg, 20 mg, Intravenous, Q24H ROSEMARY, Leonidas Sharma MD, 20 mg at 10/24/24 0948    polyethylene glycol (MIRALAX) packet 17 g, 17 g, Per G Tube, Daily PRN, Leonidas Sharma MD    potassium phosphates 21 mmol in sodium chloride 0.9 % 250 mL infusion, 21 mmol, Intravenous, Once, eKiry Ricardo MD, Last Rate: 62.5 mL/hr at 10/24/24 0948, 21 mmol at 10/24/24 0948    pravastatin (PRAVACHOL) tablet 40 mg, 40 mg, Per G Tube, Daily With Dinner, Leonidas Sharma MD, 40 mg at 10/23/24 1749    sodium chloride 0.9 % bolus 1,000 mL, 1,000 mL, Intravenous, Once, Leonidas Sharma MD    traZODone (DESYREL) tablet 100 mg, 100 mg, Per G Tube, HS PRN, Jennifer Hernandez MD    valproic acid (DEPAKENE) oral soln 125 mg, 125 mg, Per G Tube, BID, Leonidas Sharma MD, 125 mg at 10/24/24 0947    Laboratory Results:  Results from last 7 days   Lab Units 10/24/24  0802 10/23/24  0520 10/22/24  0506 10/21/24  0525 10/20/24  0532 10/19/24  0436 10/19/24  0037 10/18/24  1959 10/18/24  1518   WBC Thousand/uL 13.01* 11.50* 6.26 5.82  --  6.25  --  6.82 6.90   HEMOGLOBIN g/dL 8.1* 7.9* 9.9* 9.1*  --  9.4*  --  10.4* 11.0*   HEMATOCRIT % 25.7* 25.2* 32.0* 29.1*  --  29.9*  --  32.4* 34.4*   PLATELETS Thousands/uL 175 131* 151 127*  --  145*  --  156 173   POTASSIUM mmol/L 3.9 4.7 3.9 4.6 4.6 4.3  --  4.5 4.6   CHLORIDE mmol/L 113* 110* 112* 108 108 109*  --  99 98   CO2 mmol/L 26 22 22 27 29 28  --  36* 36*   BUN mg/dL 49* 67* 60* 52* 50* 61*  --  70* 72*   CREATININE mg/dL 1.57* 2.08* 2.31* 2.26* 2.49* 2.62*  --  2.86* 2.83*   CALCIUM mg/dL 8.4 8.5 9.7 9.6 10.1 12.4* 13.5* 14.8* 14.2*   MAGNESIUM mg/dL  --  2.1 2.0 1.9 2.1  --   --   --   --    PHOSPHORUS mg/dL 2.0* 3.3 1.1* 2.5 3.2  --   --   --   --        Portions of the record may have been  "created with voice recognition software. Occasional wrong word or \"sound a like\" substitutions may have occurred due to the inherent limitations of voice recognition software. Read the chart carefully and recognize, using context, where substitutions have occurred.If you have any questions, please contact the dictating provider.    "

## 2024-10-24 NOTE — PLAN OF CARE
Problem: Prexisting or High Potential for Compromised Skin Integrity  Goal: Skin integrity is maintained or improved  Description: INTERVENTIONS:  - Identify patients at risk for skin breakdown  - Assess and monitor skin integrity  - Assess and monitor nutrition and hydration status  - Monitor labs   - Assess for incontinence   - Turn and reposition patient  - Assist with mobility/ambulation  - Relieve pressure over bony prominences  - Avoid friction and shearing  - Provide appropriate hygiene as needed including keeping skin clean and dry  - Evaluate need for skin moisturizer/barrier cream  - Collaborate with interdisciplinary team   - Patient/family teaching  - Consider wound care consult   10/24/2024 1243 by Genna Montalvo RN  Outcome: Progressing  10/24/2024 1243 by Genna Montalvo RN  Outcome: Progressing     Problem: PAIN - ADULT  Goal: Verbalizes/displays adequate comfort level or baseline comfort level  Description: Interventions:  - Encourage patient to monitor pain and request assistance  - Assess pain using appropriate pain scale  - Administer analgesics based on type and severity of pain and evaluate response  - Implement non-pharmacological measures as appropriate and evaluate response  - Consider cultural and social influences on pain and pain management  - Notify physician/advanced practitioner if interventions unsuccessful or patient reports new pain  10/24/2024 1243 by Genna Montalvo RN  Outcome: Progressing  10/24/2024 1243 by Genna Montalvo RN  Outcome: Progressing     Problem: INFECTION - ADULT  Goal: Absence or prevention of progression during hospitalization  Description: INTERVENTIONS:  - Assess and monitor for signs and symptoms of infection  - Monitor lab/diagnostic results  - Monitor all insertion sites, i.e. indwelling lines, tubes, and drains  - Monitor endotracheal if appropriate and nasal secretions for changes in amount and color  - [place] appropriate cooling/warming  therapies per order  - Administer medications as ordered  - Instruct and encourage patient and family to use good hand hygiene technique  - Identify and instruct in appropriate isolation precautions for identified infection/condition  10/24/2024 1243 by Genna Montalvo RN  Outcome: Progressing  10/24/2024 1243 by Genna Montalvo RN  Outcome: Progressing  Goal: Absence of fever/infection during neutropenic period  Description: INTERVENTIONS:  - Monitor WBC    10/24/2024 1243 by Genna Montalvo RN  Outcome: Progressing  10/24/2024 1243 by Genna Montalvo RN  Outcome: Progressing     Problem: SAFETY ADULT  Goal: Patient will remain free of falls  Description: INTERVENTIONS:  - Educate patient/family on patient safety including physical limitations  - Instruct patient to call for assistance with activity   - Consult OT/PT to assist with strengthening/mobility   - Keep Call bell within reach  - Keep bed low and locked with side rails adjusted as appropriate  - Keep care items and personal belongings within reach  - Initiate and maintain comfort rounds  - Make Fall Risk Sign visible to staff  - Obtain necessary fall risk management equipment  - Apply yellow socks and bracelet for high fall risk patients  - Consider moving patient to room near nurses station  10/24/2024 1243 by Genna Montalvo RN  Outcome: Progressing  10/24/2024 1243 by Genna Montalvo RN  Outcome: Progressing  Goal: Maintain or return to baseline ADL function  Description: INTERVENTIONS:  -  Assess patient's ability to carry out ADLs; assess patient's baseline for ADL function and identify physical deficits which impact ability to perform ADLs (bathing, care of mouth/teeth, toileting, grooming, dressing, etc.)  - Assess/evaluate cause of self-care deficits   - Assess range of motion  - Assess patient's mobility; develop plan if impaired  - Assess patient's need for assistive devices and provide as appropriate  - Encourage maximum independence  but intervene and supervise when necessary  - Involve family in performance of ADLs  - Assess for home care needs following discharge   - Consider OT consult to assist with ADL evaluation and planning for discharge  - Provide patient education as appropriate  10/24/2024 1243 by Genna Montalvo RN  Outcome: Progressing  10/24/2024 1243 by Genna Montalvo RN  Outcome: Progressing  Goal: Maintains/Returns to pre admission functional level  Description: INTERVENTIONS:  - Perform AM-PAC 6 Click Basic Mobility/ Daily Activity assessment daily.  - Set and communicate daily mobility goal to care team and patient/family/caregiver.   - Collaborate with rehabilitation services on mobility goals if consulted  - Out of bed for toileting  - Record patient progress and toleration of activity level   10/24/2024 1243 by Genna Montalvo RN  Outcome: Progressing  10/24/2024 1243 by Genna Montalvo RN  Outcome: Progressing     Problem: DISCHARGE PLANNING  Goal: Discharge to home or other facility with appropriate resources  Description: INTERVENTIONS:  - Identify barriers to discharge w/patient and caregiver  - Arrange for needed discharge resources and transportation as appropriate  - Identify discharge learning needs (meds, wound care, etc.)  - Arrange for interpretive services to assist at discharge as needed  - Refer to Case Management Department for coordinating discharge planning if the patient needs post-hospital services based on physician/advanced practitioner order or complex needs related to functional status, cognitive ability, or social support system  10/24/2024 1243 by Genna Montalvo RN  Outcome: Progressing  10/24/2024 1243 by Genna Montalvo RN  Outcome: Progressing     Problem: Knowledge Deficit  Goal: Patient/family/caregiver demonstrates understanding of disease process, treatment plan, medications, and discharge instructions  Description: Complete learning assessment and assess knowledge  base.  Interventions:  - Provide teaching at level of understanding  - Provide teaching via preferred learning methods  10/24/2024 1243 by Genna Montalvo RN  Outcome: Progressing  10/24/2024 1243 by Genna Montalvo RN  Outcome: Progressing     Problem: Nutrition/Hydration-ADULT  Goal: Nutrient/Hydration intake appropriate for improving, restoring or maintaining nutritional needs  Description: Monitor and assess patient's nutrition/hydration status for malnutrition. Collaborate with interdisciplinary team and initiate plan and interventions as ordered.  Monitor patient's weight and dietary intake as ordered or per policy. Utilize nutrition screening tool and intervene as necessary. Determine patient's food preferences and provide high-protein, high-caloric foods as appropriate.     INTERVENTIONS:  - Monitor oral intake, urinary output, labs, and treatment plans  - Assess nutrition and hydration status and recommend course of action  - Evaluate amount of meals eaten  - Assist patient with eating if necessary   - Allow adequate time for meals  - Recommend/ encourage appropriate diets, oral nutritional supplements, and vitamin/mineral supplements  - Order, calculate, and assess calorie counts as needed  - Recommend, monitor, and adjust tube feedings and TPN/PPN based on assessed needs  - Assess need for intravenous fluids  - Provide specific nutrition/hydration education as appropriate  - Include patient/family/caregiver in decisions related to nutrition  10/24/2024 1243 by Genna Montalvo RN  Outcome: Progressing  10/24/2024 1243 by Genna Montalvo RN  Outcome: Progressing     Problem: Potential for Falls  Goal: Patient will remain free of falls  Description: INTERVENTIONS:  - Educate patient/family on patient safety including physical limitations  - Instruct patient to call for assistance with activity   - Consult OT/PT to assist with strengthening/mobility   - Keep Call bell within reach  - Keep bed low and  locked with side rails adjusted as appropriate  - Keep care items and personal belongings within reach  - Initiate and maintain comfort rounds  - Make Fall Risk Sign visible to staff  - Obtain necessary fall risk management equipment  - Apply yellow socks and bracelet for high fall risk patients  - Consider moving patient to room near nurses station  10/24/2024 1243 by Genna Montalvo RN  Outcome: Progressing  10/24/2024 1243 by Genna Montalvo, RN  Outcome: Progressing

## 2024-10-24 NOTE — ASSESSMENT & PLAN NOTE
10/21 Patient requiring up to 6L/min O2 to maintain saturations > 90%.   10/21 CXR - Faint inferior perihilar patchiness with subtle obscuration of the right cardiomediastinal silhouette could represent findings of aspiration in the appropriate clinical context.  10/22 Fever overnight. Intermittent shallow breathing noted. Lung examination clear  10/23 Ventilation/perfusion lung scan (Only lung perfusion only was performed due to the patient's condition) - The probability for pulmonary embolus is low.    Plan  Currently saturating well on room air  Maintain supplemental O2 prn to SpO2 > 92%, titrate down as able  Defer CTA chest PE study given SCAR

## 2024-10-24 NOTE — ASSESSMENT & PLAN NOTE
Lab Results   Component Value Date    HGBA1C 6.2 (H) 08/13/2024       Recent Labs     10/24/24  0602 10/24/24  0956 10/24/24  1024 10/24/24  1156   POCGLU 193* 67 217* 154*       Blood Sugar Average: Last 72 hrs:  (P) [id number]  Hold home regimen while inpatient and resume on discharge NovoLog 4 units at 6 PM and midnight while tube feeds are running and sliding scale  Goal -180 while admitted, adjusting insulin regimen as appropriate  10/22 Glucose levels elevated overnight, requiring insulin gtt    Plan  Increased to Lantus to 20 units  Continue sliding scale Algorithm 1 q6h

## 2024-10-24 NOTE — PROGRESS NOTES
Progress Note - Hospitalist   Name: Terrie Alicea 64 y.o. female I MRN: [id number]  Unit/Bed#: S -01 I Date of Admission: 10/18/2024   Date of Service: 10/24/2024 I Hospital Day: 6    Assessment & Plan  Acute hypoxic respiratory failure (HCC)  10/21 Patient requiring up to 6L/min O2 to maintain saturations > 90%.   10/21 CXR - Faint inferior perihilar patchiness with subtle obscuration of the right cardiomediastinal silhouette could represent findings of aspiration in the appropriate clinical context.  10/22 Fever overnight. Intermittent shallow breathing noted. Lung examination clear  10/23 Ventilation/perfusion lung scan (Only lung perfusion only was performed due to the patient's condition) - The probability for pulmonary embolus is low.    Plan  Currently saturating well on room air  Maintain supplemental O2 prn to SpO2 > 92%, titrate down as able  Defer CTA chest PE study given SCAR  Sepsis (HCC)  10/21 Met SIRS criteria with fever, tachycardia  10/21 CXR - Faint inferior perihilar patchiness with subtle obscuration of the right cardiomediastinal silhouette could represent findings of aspiration in the appropriate clinical context.  Urinalysis - innumerable leukocytes and moderate bacteria on microscopy  Urine culture growing Morganella (susceptible to ceftriaxone)  Elevated procalcitonin, bandemia    Plan  Infectious disease consulted, appreciate recommendations  Continue IV Rocephin (Day 3)  Patient continues to have recurrent fevers  CT CAP pending  Follow urine culture  Follow blood cultures  Monitor fever curve  UTI (urinary tract infection)  See plan for sepsis  SCAR (acute kidney injury) (HCC)  Recent Labs     10/22/24  0506 10/23/24  0520 10/24/24  0802   CREATININE 2.31* 2.08* 1.57*   EGFR 21 24 35     Estimated Creatinine Clearance: 23.5 mL/min (A) (by C-G formula based on SCr of 1.57 mg/dL (H)).    POA 2.83; (baseline 0.8-1)  US kidney and bladder - Bilateral small echogenic kidneys of medical  renal disease.   Per Nephrology, likely secondary to vasoconstriction and hemodynamic changes in setting of hypercalcemia     Plan:  Nephrology consulted, appreciate recommendations  Free water flushes increased  Monitor I/Os  Avoid nephrotoxins  Avoid hypoperfusion  Diabetes mellitus type 2, insulin dependent (HCC)  Lab Results   Component Value Date    HGBA1C 6.2 (H) 08/13/2024       Recent Labs     10/24/24  0602 10/24/24  0956 10/24/24  1024 10/24/24  1156   POCGLU 193* 67 217* 154*       Blood Sugar Average: Last 72 hrs:  (P) [id number]  Hold home regimen while inpatient and resume on discharge NovoLog 4 units at 6 PM and midnight while tube feeds are running and sliding scale  Goal -180 while admitted, adjusting insulin regimen as appropriate  10/22 Glucose levels elevated overnight, requiring insulin gtt    Plan  Increased to Lantus to 20 units  Continue sliding scale Algorithm 1 q6h  Hypercalcemia  Recent Labs     10/22/24  0506 10/23/24  0520 10/24/24  0802   CALCIUM 9.7 8.5 8.4   ALB 3.7 3.1*  --    CORRECTEDCA  --  9.2  --      Presented with abnormal lab work showing hypercalcemia of 14.2, repeat CMP showed calcium of 14.8  Per care nurse her mental status was abnormal day prior to admission, but it improved before arrival to ER  Corrected calcium: 14.6, iCal: 1.68, TSH: 0.8  Nephrology was reach out to in the ED recommended calcitonin  Etiology: Unclear right now, possibly from severe dehydration/possible malignancy considering multiple intermediate nodular densities in right breast(reflecting trauma/fat necrosis) in CT scan in 9/2024  Resolved s/p calcitonin    Plan:  PTHrp pending  Nephrology consulted, appreciate recommendations  SPEP/UPEP - No monoclonal immunoglobulins on immunofixation  Cerebral palsy (HCC)  At baseline is non verbal , totally dependent on care, resides in intermediate care facility  Continue PTA baclofen as TID prn for muscle spasms  Bipolar disorder  (HCC)  Continue PTA Depakene and Abilify  Iron deficiency anemia  Recent Labs     10/22/24  0506 10/23/24  0520 10/24/24  0802   HGB 9.9* 7.9* 8.1*   Baseline: 9-11, currently hemoglobin is baseline  Ordered iron studies  Daily CBC  Continue PTA ferrous sulfate  Transfuse if hemoglobin less than 7  Hypothyroidism  Continue PTA levothyroxine  TSH and T4 within normal limits  Hypophosphatemia  Recent Labs     10/22/24  0506 10/23/24  0520 10/24/24  0802   PHOS 1.1* 3.3 2.0*     Replete as needed  Metabolic alkalosis      VTE Pharmacologic Prophylaxis: VTE Score: 4 Moderate Risk (Score 3-4) - Pharmacological DVT Prophylaxis Ordered: heparin.    Mobility:   Basic Mobility Inpatient Raw Score: 6  JH-HLM Goal: 2: Bed activities/Dependent transfer  JH-HLM Achieved: 2: Bed activities/Dependent transfer  JH-HLM Goal achieved. Continue to encourage appropriate mobility.    Patient Centered Rounds: I performed bedside rounds with nursing staff today.   Discussions with Specialists or Other Care Team Provider: Attending, ID    Education and Discussions with Family / Patient: Updated  (caregiver Lisa) via phone.    Current Length of Stay: 6 day(s)  Current Patient Status: Inpatient   Certification Statement: The patient will continue to require additional inpatient hospital stay due to SCAR, recurrent fevers  Discharge Plan: [facility]    Code Status: Level 1 - Full Code    Subjective   No acute overnight events. Patient was seen and examined at bedside. She is febrile this morning. Patient is nonverbal at baseline.    Objective :  Temp:  [98.8 °F (37.1 °C)-102.9 °F (39.4 °C)] 98.8 °F (37.1 °C)  HR:  [] 89  BP: (122)/(73) 122/73  Resp:  [16] 16  SpO2:  [94 %-95 %] 95 %  O2 Device: None (Room air)    Body mass index is 17.81 kg/m².     Input and Output Summary (last 24 hours):     Intake/Output Summary (Last 24 hours) at 10/24/2024 1423  Last data filed at 10/24/2024  1201  Gross per 24 hour   Intake 170 ml   Output --   Net 170 ml       Physical Exam  Vitals and nursing note reviewed.   Constitutional:       General: She is not in acute distress.     Appearance: She is well-developed. She is ill-appearing.      Comments: Nonverbal at baseline   HENT:      Head: Normocephalic and atraumatic.      Mouth/Throat:      Comments: Dry lips  Eyes:      Conjunctiva/sclera: Conjunctivae normal.   Cardiovascular:      Rate and Rhythm: Normal rate and regular rhythm.      Heart sounds: No murmur heard.  Pulmonary:      Effort: Pulmonary effort is normal. No respiratory distress.      Breath sounds: Normal breath sounds.   Abdominal:      Palpations: Abdomen is soft.      Tenderness: There is no abdominal tenderness.   Musculoskeletal:         General: No swelling.      Cervical back: Neck supple.   Skin:     General: Skin is warm and dry.   Neurological:      Mental Status: She is alert. Mental status is at baseline.   Psychiatric:         Mood and Affect: Mood normal.       Lines/Drains:              Lab Results: I have reviewed the following results:   Results from last 7 days   Lab Units 10/24/24  0802 10/19/24  0436 10/18/24  1959   WBC Thousand/uL 13.01*   < > 6.82   HEMOGLOBIN g/dL 8.1*   < > 10.4*   HEMATOCRIT % 25.7*   < > 32.4*   PLATELETS Thousands/uL 175   < > 156   BANDS PCT % 15*   < >  --    SEGS PCT %  --   --  68   LYMPHO PCT % 14   < > 21   MONO PCT % 2*   < > 10   EOS PCT % 0   < > 1    < > = values in this interval not displayed.     Results from last 7 days   Lab Units 10/24/24  0802 10/23/24  0520 10/20/24  0532 10/19/24  0436   SODIUM mmol/L 147 144   < > 145   POTASSIUM mmol/L 3.9 4.7   < > 4.3   CHLORIDE mmol/L 113* 110*   < > 109*   CO2 mmol/L 26 22   < > 28   BUN mg/dL 49* 67*   < > 61*   CREATININE mg/dL 1.57* 2.08*   < > 2.62*   ANION GAP mmol/L 8 12   < > 8   CALCIUM mg/dL 8.4 8.5   < > 12.4*   ALBUMIN g/dL  --  3.1*   < > 3.7   TOTAL BILIRUBIN mg/dL  --   --    --  0.36   ALK PHOS U/L  --   --   --  47   ALT U/L  --   --   --  <3*   AST U/L  --   --   --  3*   GLUCOSE RANDOM mg/dL 73 323*   < > 146*    < > = values in this interval not displayed.     Results from last 7 days   Lab Units 10/22/24  1209   INR  0.98     Results from last 7 days   Lab Units 10/24/24  1156 10/24/24  1024 10/24/24  0956 10/24/24  0602 10/24/24  0009 10/23/24  1752 10/23/24  1149 10/23/24  0655 10/23/24  0630 10/22/24  2351 10/22/24  1745 10/22/24  1552   POC GLUCOSE mg/dl 154* 217* 67 193* 262* 118 194* 282* 287* 275* 213* 190*         Results from last 7 days   Lab Units 10/24/24  0802 10/22/24  0506   PROCALCITONIN ng/ml 3.26* 4.79*       Recent Cultures (last 7 days):   Results from last 7 days   Lab Units 10/22/24  1046 10/22/24  0902   BLOOD CULTURE   --  No Growth at 48 hrs.  No Growth at 48 hrs.   URINE CULTURE  >100,000 cfu/ml Morganella morganii*  --        Imaging Results Review: I reviewed radiology reports from this admission including: VQ scan.      Last 24 Hours Medication List:     Current Facility-Administered Medications:     acetaminophen (TYLENOL) tablet 650 mg, Q6H PRN    ARIPiprazole (ABILIFY) tablet 2 mg, HS    ascorbic acid (VITAMIN C) tablet 1,000 mg, Daily    baclofen tablet 10 mg, TID PRN    carbidopa-levodopa (SINEMET CR)  mg per ER tablet 2 tablet, BID    ceftriaxone (ROCEPHIN) 1 g/50 mL in dextrose IVPB, Q24H, Last Rate: 1,000 mg (10/23/24 1603)    citalopram (CeleXA) tablet 20 mg, Daily    dextrose 50 % IV solution **ADS Override Pull**,     Ferrous Sulfate oral syrup 300 mg, Every Other Day    influenza vaccine, recombinant (PF) (Flublok) IM injection 0.5 mL, Once    insulin glargine (LANTUS) subcutaneous injection 20 Units 0.2 mL, HS    insulin lispro (HumALOG/ADMELOG) 100 units/mL subcutaneous injection 1-5 Units, Q6H ROSEMARY **AND** Fingerstick Glucose (POCT), Q6H    Ketotifen Fumarate (ZADITOR) 0.035 % ophthalmic solution 1 drop, BID PRN    levothyroxine  tablet 25 mcg, Early Morning    midodrine (PROAMATINE) tablet 5 mg, TID PRN    oxybutynin (DITROPAN) tablet 5 mg, TID    pantoprazole (PROTONIX) injection 20 mg, Q24H ROSEMARY    polyethylene glycol (MIRALAX) packet 17 g, Daily PRN    pravastatin (PRAVACHOL) tablet 40 mg, Daily With Dinner    sodium chloride 0.9 % bolus 1,000 mL, Once    traZODone (DESYREL) tablet 100 mg, HS PRN    valproic acid (DEPAKENE) oral soln 125 mg, BID    Administrative Statements   Today, Patient Was Seen By: Keiry Ricardo MD      **Please Note: This note may have been constructed using a voice recognition system.**

## 2024-10-24 NOTE — ASSESSMENT & PLAN NOTE
Lab Results   Component Value Date    HGBA1C 6.2 (H) 08/13/2024     Recent Labs     10/23/24  1149 10/23/24  1752 10/24/24  0009 10/24/24  0602   POCGLU 194* 118 262* 193*   This is a risk factor for infection.  Well-controlled in this patient.  - Maintain euglycemia to improve WBC function

## 2024-10-24 NOTE — ASSESSMENT & PLAN NOTE
Estimated Creatinine Clearance: 23.5 mL/min (A) (by C-G formula based on SCr of 1.57 mg/dL (H)).   - Dose adjust antimicrobials / medications

## 2024-10-24 NOTE — ASSESSMENT & PLAN NOTE
Chest x-ray 10/21 showing faint inferior perihilar patchiness with subtle obscuration of right cardiomediastinal silhouette could represent findings of aspiration, no evidence of pulmonary edema  Chest x-ray 10/23 with perihilar opacities may be due to atelectasis versus pneumonia  VQ scan: Low probability of PE  CT chest pending today

## 2024-10-24 NOTE — ASSESSMENT & PLAN NOTE
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

## 2024-10-24 NOTE — ASSESSMENT & PLAN NOTE
Vs SIRS.  As evidenced by fever, tachycardia, and leukocytosis.  Possible sources include urinary tract infection, abdominal, or aspiration pneumonia/pneumonitis.  Blood cultures without growth.  Urine culture growing a susceptible Morganella, with TNTC WBC on UA.  COVID/flu/RSV negative.  No LFT abnormalities.  Fortunately, she remains hemodynamically stable.  Lung VQ scan with no clear evidence of PE.  Does not appear to be receiving any new drugs, though she is not receiving benzo's (confirmed chronic dispenses of lorazepam in [facility] - possibly withdrawing?).  - Continue ceftriaxone 1 g every 24 hours  - Check CT chest abdomen pelvis to eval other sources  - Would confirm dose of lorazepam and consider restarting  - Monitor CBC with differential, BMP, fever trend

## 2024-10-24 NOTE — ASSESSMENT & PLAN NOTE
Urine culture growing Morganella, TNTC WBC.  No indwelling catheter.  - Continue with ceftriaxone as above

## 2024-10-24 NOTE — PLAN OF CARE
Problem: Prexisting or High Potential for Compromised Skin Integrity  Goal: Skin integrity is maintained or improved  Description: INTERVENTIONS:  - Identify patients at risk for skin breakdown  - Assess and monitor skin integrity  - Assess and monitor nutrition and hydration status  - Monitor labs   - Assess for incontinence   - Turn and reposition patient  - Assist with mobility/ambulation  - Relieve pressure over bony prominences  - Avoid friction and shearing  - Provide appropriate hygiene as needed including keeping skin clean and dry  - Evaluate need for skin moisturizer/barrier cream  - Collaborate with interdisciplinary team   - Patient/family teaching  - Consider wound care consult   Outcome: Progressing     Problem: PAIN - ADULT  Goal: Verbalizes/displays adequate comfort level or baseline comfort level  Description: Interventions:  - Encourage patient to monitor pain and request assistance  - Assess pain using appropriate pain scale  - Administer analgesics based on type and severity of pain and evaluate response  - Implement non-pharmacological measures as appropriate and evaluate response  - Consider cultural and social influences on pain and pain management  - Notify physician/advanced practitioner if interventions unsuccessful or patient reports new pain  Outcome: Progressing     Problem: INFECTION - ADULT  Goal: Absence or prevention of progression during hospitalization  Description: INTERVENTIONS:  - Assess and monitor for signs and symptoms of infection  - Monitor lab/diagnostic results  - Monitor all insertion sites, i.e. indwelling lines, tubes, and drains  - Monitor endotracheal if appropriate and nasal secretions for changes in amount and color  - Hunter appropriate cooling/warming therapies per order  - Administer medications as ordered  - Instruct and encourage patient and family to use good hand hygiene technique  - Identify and instruct in appropriate isolation precautions for  identified infection/condition  Outcome: Progressing  Goal: Absence of fever/infection during neutropenic period  Description: INTERVENTIONS:  - Monitor WBC    Outcome: Progressing     Problem: SAFETY ADULT  Goal: Patient will remain free of falls  Description: INTERVENTIONS:  - Educate patient/family on patient safety including physical limitations  - Instruct patient to call for assistance with activity   - Consult OT/PT to assist with strengthening/mobility   - Keep Call bell within reach  - Keep bed low and locked with side rails adjusted as appropriate  - Keep care items and personal belongings within reach  - Initiate and maintain comfort rounds  - Make Fall Risk Sign visible to staff  - Offer Toileting every 2 Hours, in advance of need  - Initiate/Maintain bed/chair alarm  - Obtain necessary fall risk management equipment  - Apply yellow socks and bracelet for high fall risk patients  - Consider moving patient to room near nurses station  Outcome: Progressing  Goal: Maintain or return to baseline ADL function  Description: INTERVENTIONS:  -  Assess patient's ability to carry out ADLs; assess patient's baseline for ADL function and identify physical deficits which impact ability to perform ADLs (bathing, care of mouth/teeth, toileting, grooming, dressing, etc.)  - Assess/evaluate cause of self-care deficits   - Assess range of motion  - Assess patient's mobility; develop plan if impaired  - Assess patient's need for assistive devices and provide as appropriate  - Encourage maximum independence but intervene and supervise when necessary  - Involve family in performance of ADLs  - Assess for home care needs following discharge   - Consider OT consult to assist with ADL evaluation and planning for discharge  - Provide patient education as appropriate  Outcome: Progressing  Goal: Maintains/Returns to pre admission functional level  Description: INTERVENTIONS:  - Perform AM-PAC 6 Click Basic Mobility/ Daily  Activity assessment daily.  - Set and communicate daily mobility goal to care team and patient/family/caregiver.   - Collaborate with rehabilitation services on mobility goals if consulted  - Perform Range of Motion 3 times a day.  - Reposition patient every 2 hours.  - Dangle patient 3 times a day  - Stand patient 3 times a day  - Ambulate patient 3 times a day  - Out of bed to chair 3 times a day   - Out of bed for meals 3 times a day  - Out of bed for toileting  - Record patient progress and toleration of activity level   Outcome: Progressing     Problem: DISCHARGE PLANNING  Goal: Discharge to home or other facility with appropriate resources  Description: INTERVENTIONS:  - Identify barriers to discharge w/patient and caregiver  - Arrange for needed discharge resources and transportation as appropriate  - Identify discharge learning needs (meds, wound care, etc.)  - Arrange for interpretive services to assist at discharge as needed  - Refer to Case Management Department for coordinating discharge planning if the patient needs post-hospital services based on physician/advanced practitioner order or complex needs related to functional status, cognitive ability, or social support system  Outcome: Progressing     Problem: Knowledge Deficit  Goal: Patient/family/caregiver demonstrates understanding of disease process, treatment plan, medications, and discharge instructions  Description: Complete learning assessment and assess knowledge base.  Interventions:  - Provide teaching at level of understanding  - Provide teaching via preferred learning methods  Outcome: Progressing     Problem: Nutrition/Hydration-ADULT  Goal: Nutrient/Hydration intake appropriate for improving, restoring or maintaining nutritional needs  Description: Monitor and assess patient's nutrition/hydration status for malnutrition. Collaborate with interdisciplinary team and initiate plan and interventions as ordered.  Monitor patient's weight and  dietary intake as ordered or per policy. Utilize nutrition screening tool and intervene as necessary. Determine patient's food preferences and provide high-protein, high-caloric foods as appropriate.     INTERVENTIONS:  - Monitor oral intake, urinary output, labs, and treatment plans  - Assess nutrition and hydration status and recommend course of action  - Evaluate amount of meals eaten  - Assist patient with eating if necessary   - Allow adequate time for meals  - Recommend/ encourage appropriate diets, oral nutritional supplements, and vitamin/mineral supplements  - Order, calculate, and assess calorie counts as needed  - Recommend, monitor, and adjust tube feedings and TPN/PPN based on assessed needs  - Assess need for intravenous fluids  - Provide specific nutrition/hydration education as appropriate  - Include patient/family/caregiver in decisions related to nutrition  Outcome: Progressing

## 2024-10-24 NOTE — ASSESSMENT & PLAN NOTE
10/21 Met SIRS criteria with fever, tachycardia  10/21 CXR - Faint inferior perihilar patchiness with subtle obscuration of the right cardiomediastinal silhouette could represent findings of aspiration in the appropriate clinical context.  Urinalysis - innumerable leukocytes and moderate bacteria on microscopy  Urine culture growing Morganella (susceptible to ceftriaxone)  Elevated procalcitonin, bandemia    Plan  Infectious disease consulted, appreciate recommendations  Continue IV Rocephin (Day 3)  Patient continues to have recurrent fevers  CT CAP pending  Follow urine culture  Follow blood cultures  Monitor fever curve

## 2024-10-24 NOTE — CONSULTS
Consultation - Infectious Disease   Name: Terrie Alicea 64 y.o. female I MRN: [id number]  Unit/Bed#: S -01 I Date of Admission: 10/18/2024   Date of Service: 10/24/2024 I Hospital Day: 6   Inpatient consult to Infectious Diseases  Consult performed by: Leander Dodd MD  Consult ordered by: Jennifer Hernandez MD      Physician Requesting Evaluation: Jennifer Hernandez MD   Reason for Evaluation / Principal Problem: Fever    Assessment & Plan  Sepsis (HCC)  Vs SIRS.  As evidenced by fever, tachycardia, and leukocytosis.  Possible sources include urinary tract infection, abdominal, or aspiration pneumonia/pneumonitis.  Blood cultures without growth.  Urine culture growing a susceptible Morganella, with TNTC WBC on UA.  COVID/flu/RSV negative.  No LFT abnormalities.  Fortunately, she remains hemodynamically stable.  Lung VQ scan with no clear evidence of PE.  Does not appear to be receiving any new drugs, though she is not receiving benzo's (confirmed chronic dispenses of lorazepam in PDMP - possibly withdrawing?).  - Continue ceftriaxone 1 g every 24 hours  - Check CT chest abdomen pelvis to eval other sources  - Would confirm dose of lorazepam and consider restarting  - Monitor CBC with differential, BMP, fever trend  Acute hypoxic respiratory failure (HCC)  Possibly related to aspiration as above.  Some evidence of this on chest x-ray.  She is now on room air.  - Follow-up CT scans as above  - Aspiration precautions  UTI (urinary tract infection)  Urine culture growing Morganella, TNTC WBC.  No indwelling catheter.  - Continue with ceftriaxone as above  Hypercalcemia  Improved.  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  Cerebral palsy (HCC)  Continue home medications  Bipolar disorder (HCC)  Continue home medications  Diabetes mellitus type 2, insulin dependent (HCC)  Lab Results   Component Value Date    HGBA1C 6.2 (H) 08/13/2024     Recent Labs     10/23/24  1149 10/23/24  1752  10/24/24  0009 10/24/24  0602   POCGLU 194* 118 262* 193*   This is a risk factor for infection.  Well-controlled in this patient.  - Maintain euglycemia to improve WBC function    SCAR (acute kidney injury) (HCC)  Estimated Creatinine Clearance: 23.5 mL/min (A) (by C-G formula based on SCr of 1.57 mg/dL (H)).   - Dose adjust antimicrobials / medications      I have discussed the above management plan in detail with the primary service, Dr. Ricardo, who agrees with the plan to obtain CT scan and continue antibiotics.    I have performed an extensive review of the medical records in Epic including review of the notes, radiographs, and laboratory results     HISTORY OF PRESENT ILLNESS:  Reason for Consult: Fever    HPI: Terrie Alicea is a 64 y.o. female with PMH of cerebral palsy (semiverbal at baseline), T2DM, Parkinson disease, and HLD who presented on 10/18/2024 with abnormal outpatient labs (calcium 14.2, Cr 2.83 from baseline 0.81).  She was initially treated with IV fluids and calcitonin.  SPEP/UPEP/[place] showed no monoclonal gammopathy.  This was ultimately thought to be due to alkali syndrome and immobilization.  Her hospital course has been complicated by fevers and acute hypoxic respiratory failure.  ID is consulted for further recommendations.    Fevers began on 10/21, reaching Tmax 102.9 this morning.  Patient was nonverbal for me today.  The charge nurse at her facility was at bedside, and provided additional history.  She reports that Terrie gets a urinary tract infection maybe once a year.  She was hospitalized in September for an aspiration pneumonia.  Due to GERD and stomach positioning, she has been receiving gravity feeds and flushes at the facility.  She notes that when flushes were given as a bolus, the patient does not tolerate and risks aspiration.  No significant skin issues.    REVIEW OF SYSTEMS:  A complete review of systems is negative other than that noted in the HPI.    PAST MEDICAL  HISTORY:  Past Medical History:   Diagnosis Date    Acute metabolic encephalopathy 12/11/2015    Adjustment disorder     Altered mental status 12/11/2015    Anemia     Bipolar 1 disorder (HCC)     Cerebral palsy (HCC)     Chronic hypernatremia 02/06/2016    Closed fracture of left hip (HCC) 01/19/2016    Closed left hip fracture (HCC)     no surgery    Constipation     Dehydration 02/20/2016    Developmental delay     Diabetes mellitus (HCC)     Difficulty walking     Disease of thyroid gland     Diverticulosis     Dysphagia     Worsening    Fracture of multiple ribs of right side     Herpes zoster without complication 06/02/2021    Hip fracture (HCC) 07/26/2015    left    Hyperlipidemia     Hypernatremia 12/28/2018    Hypotension     Impulse control disorder     Incontinence     Intellectual disability due to developmental disorder, unspecified     Microalbuminuria     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)     Osteopathia     Osteoporosis     Peripheral neuropathy     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HCC)     Thrombocytopenia (HCC) 07/31/2015     Past Surgical History:   Procedure Laterality Date    ABDOMINAL SURGERY      COLECTOMY MIN      re-anastomosis 7/22/16    COLON SURGERY  1/31/16    COLONOSCOPY N/A 07/21/2016    Procedure: COLONOSCOPY;  Surgeon: Rosalino Jacome MD;  Location: [facility];  Service:     COLONOSCOPY N/A 01/31/2016    Procedure: COLONOSCOPY;  Surgeon: Rosalino Jacome MD;  Location[facility];  Service:     COLONOSCOPY N/A 07/25/2017    Procedure: COLONOSCOPY;  Surgeon: Rosalino Jacome MD;  Location: [facility];  Service: Colorectal    COLOSTOMY      EXPLORATORY LAPAROTOMY W/ BOWEL RESECTION N/A 01/31/2016    Procedure: exploratory laparotomy, left sigmoidectomy, coloproctostomy, take down splenic flexure, loop colostomy;  Surgeon: Rosalino Jacome MD;  Location[facility];  Service:     GASTROSTOMY TUBE PLACEMENT N/A 8/26/2024    Procedure: INSERTION GASTROSTOMY TUBE OPEN;  Surgeon: Monroe Jean  DO;  Location[facility];  Service: General    DC CLOSURE ENTEROSTOMY LG/SMALL INTESTINE N/A 2016    Procedure: SEGMENTAL COLECTOMY WITH COLOCOLOSTOMY;  Surgeon: Rosalino Jacome MD;  Location: [facility];  Service: Colorectal    UPPER GASTROINTESTINAL ENDOSCOPY         FAMILY HISTORY:  Non-contributory     SOCIAL HISTORY:  Social History   Social History     Substance and Sexual Activity   Alcohol Use Not Currently     Social History     Substance and Sexual Activity   Drug Use No     Social History     Tobacco Use   Smoking Status Never   Smokeless Tobacco Never       ALLERGIES:  Allergies   Allergen Reactions    Ingrezza [Valbenazine Tosylate] Other (See Comments)     Behavioral changes per caregiver    Valbenazine Other (See Comments)    Keflex [Cephalexin] GI Intolerance       MEDICATIONS:  All current active medications have been reviewed.      PHYSICAL EXAM:  Temp:  [99.1 °F (37.3 °C)-102.9 °F (39.4 °C)] 101.5 °F (38.6 °C)  HR:  [] 89  Resp:  [16] 16  BP: (122)/(73) 122/73  SpO2:  [91 %-95 %] 95 %  Temp (24hrs), Av.8 °F (38.2 °C), Min:99.1 °F (37.3 °C), Max:102.9 °F (39.4 °C)  Current: Temperature: (!) 101.5 °F (38.6 °C)  No intake or output data in the 24 hours ending 10/24/24 0947    General: Appears chronically ill  HEENT:  Normocephalic, sclera anicteric, MM dry  Heart:  RRR, no murmurs, rubs, or gallops  Lungs:  Clear to auscultation bilaterally  Abdomen:  Soft, nontender, nondistended, normal bowel sounds; PEG site clean  Extremities:  No cyanosis or edema  Skin:  No rashes, lesions on exposed skin  Neuro:  Awake, alert, nonverbal    LABS, IMAGING, & OTHER STUDIES:  Lab Results:  I have personally reviewed pertinent labs.  Results from last 7 days   Lab Units 10/24/24  0802 10/23/24  0520 10/22/24  0506   WBC Thousand/uL 13.01* 11.50* 6.26   HEMOGLOBIN g/dL 8.1* 7.9* 9.9*   PLATELETS Thousands/uL 175 131* 151     Results from last 7 days   Lab Units 10/24/24  0802 10/23/24  0520  "10/22/24  0506 10/20/24  0532 10/19/24  0436 10/19/24  0037 10/18/24  1959 10/18/24  1518   SODIUM mmol/L 147 144 146   < > 145  --  144 141   POTASSIUM mmol/L 3.9 4.7 3.9   < > 4.3  --  4.5 4.6   CHLORIDE mmol/L 113* 110* 112*   < > 109*  --  99 98   CO2 mmol/L 26 22 22   < > 28  --  36* 36*   BUN mg/dL 49* 67* 60*   < > 61*  --  70* 72*   CREATININE mg/dL 1.57* 2.08* 2.31*   < > 2.62*  --  2.86* 2.83*   EGFR ml/min/1.73sq m 35 24 21   < > 18  --  16 17   CALCIUM mg/dL 8.4 8.5 9.7   < > 12.4*   < > 14.8* 14.2*   AST U/L  --   --   --   --  3*  --  4* 5*   ALT U/L  --   --   --   --  <3*  --  3* 5*   ALK PHOS U/L  --   --   --   --  47  --  54 54    < > = values in this interval not displayed.     Results from last 7 days   Lab Units 10/22/24  1046 10/22/24  0902 10/19/24  0718   BLOOD CULTURE   --  No Growth at 24 hrs.  No Growth at 24 hrs.  --    URINE CULTURE  >100,000 cfu/ml Morganella morganii*  --   --    MRSA CULTURE ONLY   --   --  No Methicillin Resistant Staphlyococcus aureus (MRSA) isolated     Results from last 7 days   Lab Units 10/22/24  0506   PROCALCITONIN ng/ml 4.79*         Results from last 7 days   Lab Units 10/18/24  1518   FERRITIN ng/mL 116         No components found for: \"HIV1X2\"      Imaging Studies:   Chest x-ray 10/23/2024  I have personally reviewed the imaging study reports and images in PACS.  Faint hazy perihilar opacities, more so on the right than left.      Other Studies:   I have personally reviewed pertinent reports.      Leander Dodd MD  [facility]   "

## 2024-10-24 NOTE — ASSESSMENT & PLAN NOTE
Hemoglobin 8.1 today  Status post IV Venofer 300 mg x 1 on 10/21  Ferritin 116/iron saturation 19%  Transfuse for hemoglobin less than 7

## 2024-10-24 NOTE — ASSESSMENT & PLAN NOTE
Possibly related to aspiration as above.  Some evidence of this on chest x-ray.  She is now on room air.  - Follow-up CT scans as above  - Aspiration precautions

## 2024-10-24 NOTE — ASSESSMENT & PLAN NOTE
Recent Labs     10/22/24  0506 10/23/24  0520 10/24/24  0802   CALCIUM 9.7 8.5 8.4   ALB 3.7 3.1*  --    CORRECTEDCA  --  9.2  --      Presented with abnormal lab work showing hypercalcemia of 14.2, repeat CMP showed calcium of 14.8  Per care nurse her mental status was abnormal day prior to admission, but it improved before arrival to ER  Corrected calcium: 14.6, iCal: 1.68, TSH: 0.8  Nephrology was reach out to in the ED recommended calcitonin  Etiology: Unclear right now, possibly from severe dehydration/possible malignancy considering multiple intermediate nodular densities in right breast(reflecting trauma/fat necrosis) in CT scan in 9/2024  Resolved s/p calcitonin    Plan:  PTHrp pending  Nephrology consulted, appreciate recommendations  SPEP/UPEP - No monoclonal immunoglobulins on immunofixation

## 2024-10-24 NOTE — ASSESSMENT & PLAN NOTE
Subjective   Shadi Johansen is a 18 y.o.   male.  HPI    Objective   Neurological Exam  Physical Exam      Assessment/Plan      Urine culture growing more than 100,000 Morganella  Currently on IV ceftriaxone per primary team

## 2024-10-24 NOTE — ASSESSMENT & PLAN NOTE
Etiology: Likely secondary to vasoconstriction and hemodynamic changes in setting of hypercalcemia leading to ATN, now having insensible losses in setting of ongoing fevers  Baseline creatinine 0.8  Creatinine on admission 2.83  Creatinine edith 2.26 on 10/21  Creatinine increased to 2.31 on 10/22  Creatinine today improved to 1.57 after IV fluid challenge on 10/22 and stent/23  UA: No hematuria  Renal imaging: No hydronephrosis  Continue free water flushes, increase to 250 cc every 4 hours  Hold off further IV fluids today

## 2024-10-25 LAB
ANION GAP SERPL CALCULATED.3IONS-SCNC: 4 MMOL/L (ref 4–13)
BASOPHILS # BLD MANUAL: 0.09 THOUSAND/UL (ref 0–0.1)
BASOPHILS NFR MAR MANUAL: 1 % (ref 0–1)
BUN SERPL-MCNC: 44 MG/DL (ref 5–25)
CA-I BLD-SCNC: 1.09 MMOL/L (ref 1.12–1.32)
CALCIUM SERPL-MCNC: 7.9 MG/DL (ref 8.4–10.2)
CHLORIDE SERPL-SCNC: 118 MMOL/L (ref 96–108)
CO2 SERPL-SCNC: 23 MMOL/L (ref 21–32)
CREAT SERPL-MCNC: 1.43 MG/DL (ref 0.6–1.3)
DME PARACHUTE DELIVERY DATE REQUESTED: NORMAL
DME PARACHUTE ITEM DESCRIPTION: NORMAL
DME PARACHUTE ORDER STATUS: NORMAL
DME PARACHUTE SUPPLIER NAME: NORMAL
DME PARACHUTE SUPPLIER PHONE: NORMAL
EOSINOPHIL # BLD MANUAL: 0.19 THOUSAND/UL (ref 0–0.4)
EOSINOPHIL NFR BLD MANUAL: 2 % (ref 0–6)
ERYTHROCYTE [DISTWIDTH] IN BLOOD BY AUTOMATED COUNT: 13.7 % (ref 11.6–15.1)
GFR SERPL CREATININE-BSD FRML MDRD: 39 ML/MIN/1.73SQ M
GLUCOSE SERPL-MCNC: 103 MG/DL (ref 65–140)
GLUCOSE SERPL-MCNC: 103 MG/DL (ref 65–140)
GLUCOSE SERPL-MCNC: 120 MG/DL (ref 65–140)
GLUCOSE SERPL-MCNC: 160 MG/DL (ref 65–140)
GLUCOSE SERPL-MCNC: 172 MG/DL (ref 65–140)
GLUCOSE SERPL-MCNC: 176 MG/DL (ref 65–140)
GLUCOSE SERPL-MCNC: 180 MG/DL (ref 65–140)
GLUCOSE SERPL-MCNC: 74 MG/DL (ref 65–140)
GLUCOSE SERPL-MCNC: 99 MG/DL (ref 65–140)
HCT VFR BLD AUTO: 29.9 % (ref 34.8–46.1)
HGB BLD-MCNC: 8.5 G/DL (ref 11.5–15.4)
LYMPHOCYTES # BLD AUTO: 2.36 THOUSAND/UL (ref 0.6–4.47)
LYMPHOCYTES # BLD AUTO: 25 % (ref 14–44)
MAGNESIUM SERPL-MCNC: 2 MG/DL (ref 1.9–2.7)
MCH RBC QN AUTO: 33.2 PG (ref 26.8–34.3)
MCHC RBC AUTO-ENTMCNC: 28.4 G/DL (ref 31.4–37.4)
MCV RBC AUTO: 117 FL (ref 82–98)
MONOCYTES # BLD AUTO: 0.19 THOUSAND/UL (ref 0–1.22)
MONOCYTES NFR BLD: 2 % (ref 4–12)
MYELOCYTE ABSOLUTE CT: 0.28 THOUSAND/UL (ref 0–0.1)
MYELOCYTES NFR BLD MANUAL: 3 % (ref 0–1)
NEUTROPHILS # BLD MANUAL: 6.32 THOUSAND/UL (ref 1.85–7.62)
NEUTS BAND NFR BLD MANUAL: 1 % (ref 0–8)
NEUTS SEG NFR BLD AUTO: 66 % (ref 43–75)
PHOSPHATE SERPL-MCNC: 3.2 MG/DL (ref 2.3–4.1)
PLATELET # BLD AUTO: 160 THOUSANDS/UL (ref 149–390)
PLATELET BLD QL SMEAR: ADEQUATE
PMV BLD AUTO: 12.3 FL (ref 8.9–12.7)
POTASSIUM SERPL-SCNC: 4.8 MMOL/L (ref 3.5–5.3)
POTASSIUM SERPL-SCNC: 6.1 MMOL/L (ref 3.5–5.3)
RBC # BLD AUTO: 2.56 MILLION/UL (ref 3.81–5.12)
RBC MORPH BLD: NORMAL
SODIUM SERPL-SCNC: 145 MMOL/L (ref 135–147)
WBC # BLD AUTO: 9.44 THOUSAND/UL (ref 4.31–10.16)

## 2024-10-25 PROCEDURE — 99233 SBSQ HOSP IP/OBS HIGH 50: CPT | Performed by: INTERNAL MEDICINE

## 2024-10-25 PROCEDURE — 83735 ASSAY OF MAGNESIUM: CPT

## 2024-10-25 PROCEDURE — 80048 BASIC METABOLIC PNL TOTAL CA: CPT

## 2024-10-25 PROCEDURE — 85027 COMPLETE CBC AUTOMATED: CPT

## 2024-10-25 PROCEDURE — 99232 SBSQ HOSP IP/OBS MODERATE 35: CPT | Performed by: INTERNAL MEDICINE

## 2024-10-25 PROCEDURE — 85007 BL SMEAR W/DIFF WBC COUNT: CPT

## 2024-10-25 PROCEDURE — 84132 ASSAY OF SERUM POTASSIUM: CPT

## 2024-10-25 PROCEDURE — 82948 REAGENT STRIP/BLOOD GLUCOSE: CPT

## 2024-10-25 PROCEDURE — 82330 ASSAY OF CALCIUM: CPT

## 2024-10-25 PROCEDURE — 84100 ASSAY OF PHOSPHORUS: CPT

## 2024-10-25 RX ORDER — POLYETHYLENE GLYCOL 3350 17 G/17G
17 POWDER, FOR SOLUTION ORAL DAILY
Status: DISCONTINUED | OUTPATIENT
Start: 2024-10-25 | End: 2024-11-03

## 2024-10-25 RX ORDER — DEXTROSE MONOHYDRATE 25 G/50ML
25 INJECTION, SOLUTION INTRAVENOUS ONCE
Status: COMPLETED | OUTPATIENT
Start: 2024-10-25 | End: 2024-10-25

## 2024-10-25 RX ORDER — BACLOFEN 10 MG/1
10 TABLET ORAL 3 TIMES DAILY
Status: DISCONTINUED | OUTPATIENT
Start: 2024-10-25 | End: 2024-11-04 | Stop reason: HOSPADM

## 2024-10-25 RX ADMIN — CITALOPRAM HYDROBROMIDE 20 MG: 20 TABLET, FILM COATED ORAL at 09:01

## 2024-10-25 RX ADMIN — PANTOPRAZOLE SODIUM 20 MG: 40 INJECTION, POWDER, FOR SOLUTION INTRAVENOUS at 09:00

## 2024-10-25 RX ADMIN — INSULIN LISPRO 1 UNITS: 100 INJECTION, SOLUTION INTRAVENOUS; SUBCUTANEOUS at 00:23

## 2024-10-25 RX ADMIN — Medication 600 MG: at 03:37

## 2024-10-25 RX ADMIN — HEPARIN SODIUM 5000 UNITS: 5000 INJECTION INTRAVENOUS; SUBCUTANEOUS at 14:10

## 2024-10-25 RX ADMIN — LEVOTHYROXINE SODIUM 25 MCG: 25 TABLET ORAL at 05:23

## 2024-10-25 RX ADMIN — PRAVASTATIN SODIUM 40 MG: 40 TABLET ORAL at 17:01

## 2024-10-25 RX ADMIN — BACLOFEN 10 MG: 10 TABLET ORAL at 10:24

## 2024-10-25 RX ADMIN — ARIPIPRAZOLE 2 MG: 2 TABLET ORAL at 21:14

## 2024-10-25 RX ADMIN — OXYCODONE HYDROCHLORIDE AND ACETAMINOPHEN 1000 MG: 500 TABLET ORAL at 09:01

## 2024-10-25 RX ADMIN — POLYETHYLENE GLYCOL 3350 17 G: 17 POWDER, FOR SOLUTION ORAL at 09:00

## 2024-10-25 RX ADMIN — BACLOFEN 10 MG: 10 TABLET ORAL at 17:01

## 2024-10-25 RX ADMIN — CEFTRIAXONE 1000 MG: 2 INJECTION, POWDER, FOR SOLUTION INTRAMUSCULAR; INTRAVENOUS at 14:10

## 2024-10-25 RX ADMIN — CARBIDOPA AND LEVODOPA 2 TABLET: 25; 100 TABLET, EXTENDED RELEASE ORAL at 17:00

## 2024-10-25 RX ADMIN — VALPROIC ACID 125 MG: 250 SOLUTION ORAL at 21:13

## 2024-10-25 RX ADMIN — FERROUS SULFATE 300 MG: 300 SOLUTION ORAL at 09:00

## 2024-10-25 RX ADMIN — OXYBUTYNIN CHLORIDE 5 MG: 5 TABLET ORAL at 17:01

## 2024-10-25 RX ADMIN — DEXTROSE MONOHYDRATE 25 ML: 25 INJECTION, SOLUTION INTRAVENOUS at 08:52

## 2024-10-25 RX ADMIN — VALPROIC ACID 125 MG: 250 SOLUTION ORAL at 09:01

## 2024-10-25 RX ADMIN — OXYBUTYNIN CHLORIDE 5 MG: 5 TABLET ORAL at 21:14

## 2024-10-25 RX ADMIN — LORAZEPAM 0.5 MG: 0.5 TABLET ORAL at 21:14

## 2024-10-25 RX ADMIN — CARBIDOPA AND LEVODOPA 2 TABLET: 25; 100 TABLET, EXTENDED RELEASE ORAL at 09:01

## 2024-10-25 RX ADMIN — BACLOFEN 10 MG: 10 TABLET ORAL at 21:14

## 2024-10-25 RX ADMIN — SENNOSIDES AND DOCUSATE SODIUM 1 TABLET: 50; 8.6 TABLET ORAL at 21:14

## 2024-10-25 RX ADMIN — OXYBUTYNIN CHLORIDE 5 MG: 5 TABLET ORAL at 09:01

## 2024-10-25 RX ADMIN — HEPARIN SODIUM 5000 UNITS: 5000 INJECTION INTRAVENOUS; SUBCUTANEOUS at 21:15

## 2024-10-25 RX ADMIN — HEPARIN SODIUM 5000 UNITS: 5000 INJECTION INTRAVENOUS; SUBCUTANEOUS at 05:23

## 2024-10-25 NOTE — ASSESSMENT & PLAN NOTE
Recent Labs     10/23/24  0520 10/24/24  0802 10/25/24  0520   HGB 7.9* 8.1* 8.5*   Baseline: 9-11, currently hemoglobin is baseline  Ordered iron studies  Daily CBC  Continue PTA ferrous sulfate  Transfuse if hemoglobin less than 7

## 2024-10-25 NOTE — ASSESSMENT & PLAN NOTE
Lab Results   Component Value Date    HGBA1C 6.2 (H) 08/13/2024       Recent Labs     10/25/24  0545 10/25/24  0823 10/25/24  1032 10/25/24  1208   POCGLU 103 74 160* 120       Blood Sugar Average: Last 72 hrs:  (P) [phone]  Hold home regimen while inpatient and resume on discharge regular insulin 4 units at 6 PM and midnight while tube feeds are running and sliding scale  10/22 Glucose levels elevated overnight, requiring insulin gtt    Plan  Resumed PTA regimen of regular insulin 4 units twice daily (6PM and midnight)  Continue sliding scale Algorithm 1 q6h  Monitor blood glucose. Goal -180 while admitted, adjusting insulin regimen as appropriate

## 2024-10-25 NOTE — ASSESSMENT & PLAN NOTE
Recent Labs     10/23/24  0520 10/24/24  0802 10/25/24  0520   CALCIUM 8.5 8.4 7.9*   ALB 3.1*  --   --    CORRECTEDCA 9.2  --   --      Presented with abnormal lab work showing hypercalcemia of 14.2, repeat CMP showed calcium of 14.8  Per care nurse her mental status was abnormal day prior to admission, but it improved before arrival to ER  Corrected calcium: 14.6, iCal: 1.68, TSH: 0.8  Nephrology was reach out to in the ED recommended calcitonin  Etiology: Possibly calcium supplementation in the setting of immobility  Resolved s/p calcitonin  SPEP/UPEP - No monoclonal immunoglobulins on immunofixation    Plan:  PTHrp pending  Nephrology consulted, appreciate recommendations

## 2024-10-25 NOTE — PROGRESS NOTES
Progress Note - Infectious Disease   Name: Terrie Alicea 64 y.o. female I MRN: [id number]  Unit/Bed#: S -01 I Date of Admission: 10/18/2024   Date of Service: 10/25/2024 I Hospital Day: 7    Assessment & Plan  Sepsis (HCC)  Vs SIRS.  As evidenced by fever, tachycardia, and leukocytosis.  Possible sources include urinary tract infection, abdominal, or aspiration pneumonia/pneumonitis.  Blood cultures without growth.  Urine culture growing a susceptible Morganella, with TNTC WBC on UA.  COVID/flu/RSV negative.  No LFT abnormalities.  Fortunately, she remains hemodynamically stable.  Lung VQ scan with no clear evidence of PE.  CTAP with multifocal pulmonary infiltrates, no abdominal pathology.  Does not appear to be receiving any new drugs, though she was not receiving benzo's (confirmed chronic dispenses of lorazepam in PDMP - possible withdrawal?).  - Continue ceftriaxone 1 g every 24 hours, plan for 7 days total antibiotics (10/28 end); could consider cefpodoxime if fevers improve to finish course  - Would like to see fevers normalize over the weekend  - Monitor CBC with differential, BMP, fever trend to evaluate for medication toxicities and treatment response  Acute hypoxic respiratory failure (HCC)  Possibly related to aspiration as above.  Some evidence of this on chest x-ray.  She is now on room air.  - Aspiration precautions  UTI (urinary tract infection)  Urine culture growing Morganella, TNTC WBC.  No indwelling catheter.  - Continue with ceftriaxone as above  Hypercalcemia  Improved.  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  Cerebral palsy (HCC)  Continue home medications  Bipolar disorder (HCC)  Continue home medications  Diabetes mellitus type 2, insulin dependent ([facility])  Lab Results   Component Value Date    HGBA1C 6.2 (H) 08/13/2024     Recent Labs     10/25/24  0545 10/25/24  0823 10/25/24  1032 10/25/24  1208   POCGLU 103 74 160* 120   This is a risk factor for infection.   Well-controlled in this patient.  - Maintain euglycemia to improve WBC function    SCAR (acute kidney injury) (HCC)  Estimated Creatinine Clearance: 25.8 mL/min (A) (by C-G formula based on SCr of 1.43 mg/dL (H)).   - Dose adjust antimicrobials / medications      I have discussed the above management plan in detail with the primary service, Dr Ricardo, who agrees with continued antibiotics and monitoring fever trend.  We will continue to follow along with you..     I have performed an extensive review of the medical records in Epic including review of the notes, radiographs, and laboratory results     Antibiotics:  Ceftriaxone day 4    24 Hour Events:  Fevers lower overnight.    Subjective:  Patient's home caregiver at bedside.  Reports it seems she is doing better.  No difficulty stooling or urinating.  Patient nonverbal.    Objective:  Vitals:  Temp:  [98.9 °F (37.2 °C)-101.2 °F (38.4 °C)] 100 °F (37.8 °C)  HR:  [] 78  Resp:  [16-20] 16  BP: (124-144)/(66-75) 124/68  SpO2:  [95 %-99 %] 98 %  Temp (24hrs), Av.4 °F (38 °C), Min:98.9 °F (37.2 °C), Max:101.2 °F (38.4 °C)  Current: Temperature: 100 °F (37.8 °C)      Physical Exam:   General: Appears chronically ill, in no apparent distress  HEENT:  Normocephalic, sclera anicteric, MMM  Heart:  RRR, no murmurs, rubs, or gallops  Lungs:  Clear to auscultation bilaterally  Abdomen:  Soft, nontender, nondistended, normal bowel sounds  :  No suprapubic tenderness  Extremities:  No cyanosis or edema  Skin:  No rashes, lesions on exposed skin  Neuro:  Awake, alert, tracking to voice      Labs:   All pertinent labs and imaging studies were personally reviewed  Results from last 7 days   Lab Units 10/25/24  0520 10/24/24  0802 10/23/24  0520   WBC Thousand/uL 9.44 13.01* 11.50*   HEMOGLOBIN g/dL 8.5* 8.1* 7.9*   PLATELETS Thousands/uL 160 175 131*     Results from last 7 days   Lab Units 10/25/24  0817 10/25/24  0520 10/24/24  0802 10/23/24  0520 10/20/24  0532  10/19/24  0436 10/19/24  0037 10/18/24  1959 10/18/24  1518   SODIUM mmol/L  --  145 147 144   < > 145  --  144 141   POTASSIUM mmol/L 4.8 6.1* 3.9 4.7   < > 4.3  --  4.5 4.6   CHLORIDE mmol/L  --  118* 113* 110*   < > 109*  --  99 98   CO2 mmol/L  --  23 26 22   < > 28  --  36* 36*   BUN mg/dL  --  44* 49* 67*   < > 61*  --  70* 72*   CREATININE mg/dL  --  1.43* 1.57* 2.08*   < > 2.62*  --  2.86* 2.83*   EGFR ml/min/1.73sq m  --  39 35 24   < > 18  --  16 17   CALCIUM mg/dL  --  7.9* 8.4 8.5   < > 12.4*   < > 14.8* 14.2*   AST U/L  --   --   --   --   --  3*  --  4* 5*   ALT U/L  --   --   --   --   --  <3*  --  3* 5*   ALK PHOS U/L  --   --   --   --   --  47  --  54 54    < > = values in this interval not displayed.     Results from last 7 days   Lab Units 10/24/24  0802 10/22/24  0506   PROCALCITONIN ng/ml 3.26* 4.79*         Results from last 7 days   Lab Units 10/18/24  1518   FERRITIN ng/mL 116           Microbiology:  Results from last 7 days   Lab Units 10/22/24  1046 10/22/24  0902 10/19/24  0718   BLOOD CULTURE   --  No Growth at 48 hrs.  No Growth at 48 hrs.  --    URINE CULTURE  >100,000 cfu/ml Morganella morganii*  --   --    MRSA CULTURE ONLY   --   --  No Methicillin Resistant Staphlyococcus aureus (MRSA) isolated       Imaging:  CT chest abdomen pelvis without contrast 10/24/2024  I have independently reviewed pertinent imaging study reports and images in PACS.  Consolidative and groundglass changes in the bilateral lung bases.  No stranding around kidneys or bladder, though bladder distended.  Moderate stool burden.      Leander Dodd MD  [facility]     No

## 2024-10-25 NOTE — ASSESSMENT & PLAN NOTE
Estimated Creatinine Clearance: 25.8 mL/min (A) (by C-G formula based on SCr of 1.43 mg/dL (H)).   - Dose adjust antimicrobials / medications

## 2024-10-25 NOTE — ASSESSMENT & PLAN NOTE
Possibly related to aspiration as above.  Some evidence of this on chest x-ray.  She is now on room air.  - Aspiration precautions

## 2024-10-25 NOTE — CASE MANAGEMENT
Case Management Discharge Planning Note    Patient name Terrie Ailcea  Location S /S -01 MRN [id number]  : 1960 Date 10/25/2024       Current Admission Date: 10/18/2024  Current Admission Diagnosis:Hypercalcemia   Patient Active Problem List    Diagnosis Date Noted Date Diagnosed    Hypophosphatemia 10/22/2024     Metabolic alkalosis 10/19/2024     History of aspiration pneumonia 10/10/2024     Mood disturbance 2024     Mood disturbances 2024     Severe protein-calorie malnutrition (HCC) 2024     Functional quadriplegia ([facility]) 2024     Protein-calorie malnutrition, unspecified severity ([facility]) 2024     Hospital discharge follow-up 2024     SIRS (systemic inflammatory response syndrome) ([facility]) 2024     SCAR (acute kidney injury) ([facility]) 05/10/2024     Developmental delay      Preop examination 2023     Cataract 2023     Internal hemorrhoids 2023     Contracture of right hand 2023     Need for shingles vaccine 2023     Viral illness 2022     Dystonic cerebral palsy ([facility]) 2022     Cervical dystonia 2022     Parkinson's disease ([facility]) 2022     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[facility]) 10/18/2021     Polyneuropathy associated with underlying disease ([facility]) 10/18/2021     Hypercalcemia 2021     Herpes zoster without complication 2021     Abnormal finding on lung imaging 2021     Anemia 2020     Eosinophilic leukocytosis 2020     Underweight 2020     Dysphagia 2020     History of vitamin D deficiency 2020     History of fall 10/28/2019     Breast asymmetry 2019     Hypernatremia 2018     Pneumonia 2018     Sepsis (HCC) 2018     Gait disturbance 2018     Osteoarthritis of both hips 2018     Severe Intellectual disability 2018     Diabetes mellitus type 2, insulin dependent (HCC) 2018     Abnormal albumin 2017     Peripheral  neuropathy 09/18/2017     Seasonal allergies 06/29/2017     Physical deconditioning 05/09/2017     Hyperlipidemia 03/27/2017     Gastroesophageal reflux disease 03/27/2017     Cerebral palsy (HCC) 03/27/2017     Spasticity 03/27/2017     Ambulatory dysfunction 03/27/2017     UTI (urinary tract infection) 03/27/2017     Bipolar disorder (HCC) 03/27/2017     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[facility]) 02/01/2016     Weight loss 01/21/2016     Osteoporosis 01/19/2016     Resting tremor 01/19/2016     Acute metabolic encephalopathy 12/11/2015     Gait abnormality 12/11/2015     Other chronic pain 07/30/2015     Postmenopausal vaginal bleeding 07/23/2015     Anxiety 07/15/2015     Menopause 09/25/2014     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/28/2014     Atopic dermatitis 06/02/2014     Dry eye 06/02/2014     Constipation 04/12/2013     Iron deficiency anemia 03/27/2013     Urinary incontinence 03/27/2013     Hypothyroidism 01/10/2013       LOS (days): 7  Geometric Mean LOS (GMLOS) (days):   Days to GMLOS:     OBJECTIVE:  Risk of Unplanned Readmission Score: 54.34         Current admission status: Inpatient   Preferred Pharmacy:   [facility] [place] - [address]  [address]  [address]  [place] [address]  Phone: [phone] Fax: [phone]    [facility]  [address]  [place] [address]  Phone: [phone] Fax: [phone]    [facility] - [place], [place] - [address]  [address]  [facility] [place] [address]  Phone: [phone] Fax: [phone]    [facility] ([place] - [place] - [address]  [address]  [place] [address]  Phone: [phone] Fax: [phone]    [place], [place] - [address]  [address] [facility] [address]  Phone: [phone] Fax: [phone]    Primary Care Provider: Gunnar Sylvester DO    [facility]  Secondary Insurance: [place] [employer]    DISCHARGE DETAILS:       CM spoke with provider in care coordination rounds today -- As per provider, patient is NOT yet medically stable for discharge. Patient continues to have recurrent fevers secondary to sepsis -- Plan is to continue IV abx, monitor fever curve, consult ID, and treat pneumonia. Anticipate medical stability is >72 hours.             CM spoke with patient's  Manager, Lisa ([phone]) to discuss discharge planning before the weekend. As per Lisa, the  does not accept weekend admissions. Patient has no medications at home, which need to be ordered in advance on a weekday.    Lisa confirmed patient may return once medically stable (on a weekday) -- She will provide transportation home with the support of other staff members.    Lisa requests a script to [facility] via [place] for \"test strips and lancets 4x/day + PRN\" -- Order placed as requested.    No further discharge planning needs -- CM to provide at least 24 hours notice of discharge.                                                                                                    "

## 2024-10-25 NOTE — ASSESSMENT & PLAN NOTE
Hemoglobin 8.5 today  Status post IV Venofer 300 mg x 1 on 10/21  Ferritin 116/iron saturation 19%  Transfuse for hemoglobin less than 7

## 2024-10-25 NOTE — ASSESSMENT & PLAN NOTE
Lab Results   Component Value Date    HGBA1C 6.2 (H) 08/13/2024     Recent Labs     10/25/24  0545 10/25/24  0823 10/25/24  1032 10/25/24  1208   POCGLU 103 74 160* 120   This is a risk factor for infection.  Well-controlled in this patient.  - Maintain euglycemia to improve WBC function

## 2024-10-25 NOTE — ASSESSMENT & PLAN NOTE
Vs SIRS.  As evidenced by fever, tachycardia, and leukocytosis.  Possible sources include urinary tract infection, abdominal, or aspiration pneumonia/pneumonitis.  Blood cultures without growth.  Urine culture growing a susceptible Morganella, with TNTC WBC on UA.  COVID/flu/RSV negative.  No LFT abnormalities.  Fortunately, she remains hemodynamically stable.  Lung VQ scan with no clear evidence of PE.  CTAP with multifocal pulmonary infiltrates, no abdominal pathology.  Does not appear to be receiving any new drugs, though she was not receiving benzo's (confirmed chronic dispenses of lorazepam in PDMP - possible withdrawal?).  - Continue ceftriaxone 1 g every 24 hours, plan for 7 days total antibiotics (10/28 end); could consider cefpodoxime if fevers improve to finish course  - Would like to see fevers normalize over the weekend  - Monitor CBC with differential, BMP, fever trend to evaluate for medication toxicities and treatment response

## 2024-10-25 NOTE — ASSESSMENT & PLAN NOTE
Etiology: Likely secondary to vasoconstriction and hemodynamic changes in setting of hypercalcemia leading to ATN, now having insensible losses in setting of ongoing fevers  Baseline creatinine 0.8  Creatinine on admission 2.83  Creatinine edith 2.26 on 10/21  Creatinine increased to 2.31 on 10/22  Creatinine today improved to 1.43  UA: No hematuria  Renal imaging: No hydronephrosis  Continue free water flushes at 250 cc every 4 hours  No need for further IV fluids

## 2024-10-25 NOTE — PROGRESS NOTES
Progress Note - Hospitalist   Name: Terrie Alicea 64 y.o. female I MRN: [id number]  Unit/Bed#: S -01 I Date of Admission: 10/18/2024   Date of Service: 10/25/2024 I Hospital Day: 7    Assessment & Plan  Acute hypoxic respiratory failure (HCC) (Resolved: 10/25/2024)  10/21 Patient requiring up to 6L/min O2 to maintain saturations > 90%.   10/21 CXR - Faint inferior perihilar patchiness with subtle obscuration of the right cardiomediastinal silhouette could represent findings of aspiration in the appropriate clinical context.  10/22 Fever overnight. Intermittent shallow breathing noted. Lung examination clear  10/23 Ventilation/perfusion lung scan (Only lung perfusion only was performed due to the patient's condition) - The probability for pulmonary embolus is low.    Plan  Currently saturating well on room air  Maintain supplemental O2 prn to SpO2 > 92%, titrate down as able  Defer CTA chest PE study given SCAR  Sepsis (HCC)  10/21 Met SIRS criteria with fever, tachycardia  10/21 CXR - Faint inferior perihilar patchiness with subtle obscuration of the right cardiomediastinal silhouette could represent findings of aspiration in the appropriate clinical context.  Urinalysis - innumerable leukocytes and moderate bacteria on microscopy  Urine culture growing Morganella (susceptible to ceftriaxone)  Blood cultures negative to date  Elevated procalcitonin, bandemia  CT CAP - Multilobar pneumonia. No acute findings in the abdomen or pelvis. Prominent rectal vault stool without bowel obstruction.    Plan  Infectious disease consulted, appreciate recommendations  Continue IV Rocephin (Day 4). Likely additional 1-2 day course.  Patient continues to have recurrent fevers  Monitor fever curve  Pneumonia  See plan for sepsis  UTI (urinary tract infection)  See plan for sepsis  SCAR (acute kidney injury) ([facility])  Recent Labs     10/23/24  0520 10/24/24  0802 10/25/24  0520   CREATININE 2.08* 1.57* 1.43*   EGFR 24 35 39      Estimated Creatinine Clearance: 25.8 mL/min (A) (by C-G formula based on SCr of 1.43 mg/dL (H)).    POA 2.83; (baseline 0.8-1)  US kidney and bladder - Bilateral small echogenic kidneys of medical renal disease.   Per Nephrology, likely secondary to vasoconstriction and hemodynamic changes in setting of hypercalcemia     Plan:  Nephrology consulted, appreciate recommendations  Free water flushes increased  Monitor I/Os  Avoid nephrotoxins  Avoid hypoperfusion  Diabetes mellitus type 2, insulin dependent (HCC)  Lab Results   Component Value Date    HGBA1C 6.2 (H) 08/13/2024       Recent Labs     10/25/24  0545 10/25/24  0823 10/25/24  1032 10/25/24  1208   POCGLU 103 74 160* 120       Blood Sugar Average: Last 72 hrs:  (P) 225.76816  Hold home regimen while inpatient and resume on discharge regular insulin 4 units at 6 PM and midnight while tube feeds are running and sliding scale  10/22 Glucose levels elevated overnight, requiring insulin gtt    Plan  Resumed PTA regimen of regular insulin 4 units twice daily (6PM and midnight)  Continue sliding scale Algorithm 1 q6h  Monitor blood glucose. Goal -180 while admitted, adjusting insulin regimen as appropriate  Hypercalcemia  Recent Labs     10/23/24  0520 10/24/24  0802 10/25/24  0520   CALCIUM 8.5 8.4 7.9*   ALB 3.1*  --   --    CORRECTEDCA 9.2  --   --      Presented with abnormal lab work showing hypercalcemia of 14.2, repeat CMP showed calcium of 14.8  Per care nurse her mental status was abnormal day prior to admission, but it improved before arrival to ER  Corrected calcium: 14.6, iCal: 1.68, TSH: 0.8  Nephrology was reach out to in the ED recommended calcitonin  Etiology: Possibly calcium supplementation in the setting of immobility  Resolved s/p calcitonin  SPEP/UPEP - No monoclonal immunoglobulins on immunofixation    Plan:  PTHrp pending  Nephrology consulted, appreciate recommendations  Cerebral palsy (HCC)  At baseline is non verbal , totally  dependent on care, resides in intermediate care facility  Continue PTA baclofen TID  Bipolar disorder (HCC)  Continue PTA Depakene and Abilify  Iron deficiency anemia  Recent Labs     10/23/24  0520 10/24/24  0802 10/25/24  0520   HGB 7.9* 8.1* 8.5*   Baseline: 9-11, currently hemoglobin is baseline  Ordered iron studies  Daily CBC  Continue PTA ferrous sulfate  Transfuse if hemoglobin less than 7  Hypothyroidism  Continue PTA levothyroxine  TSH and T4 within normal limits    VTE Pharmacologic Prophylaxis: VTE Score: 4 Moderate Risk (Score 3-4) - Pharmacological DVT Prophylaxis Ordered: heparin.    Mobility:   Basic Mobility Inpatient Raw Score: 6  JH-HLM Goal: 2: Bed activities/Dependent transfer  JH-HLM Achieved: 2: Bed activities/Dependent transfer  JH-HLM Goal achieved. Continue to encourage appropriate mobility.    Patient Centered Rounds: I performed bedside rounds with nursing staff today.   Discussions with Specialists or Other Care Team Provider: Attending, Infectious disease    Education and Discussions with Family / Patient: Updated  (caregiver Lisa) via phone.    Current Length of Stay: 7 day(s)  Current Patient Status: Inpatient   Certification Statement: The patient will continue to require additional inpatient hospital stay due to SCAR, pneumonia  Discharge Plan: Anticipate discharge in >72 hrs to [facility].    Code Status: Level 1 - Full Code    Subjective   Patient was seen and examined at bedside. Patient is nonverbal at baseline. She had a loose bowel movement this morning.    Objective :  Temp:  [98.9 °F (37.2 °C)-101.2 °F (38.4 °C)] 100 °F (37.8 °C)  HR:  [] 78  BP: (124-144)/(66-75) 124/68  Resp:  [16-20] 16  SpO2:  [95 %-99 %] 98 %  O2 Device: None (Room air)    Body mass index is 17.81 kg/m².     Input and Output Summary (last 24 hours):     Intake/Output Summary (Last 24 hours) at 10/25/2024 1319  Last data filed at 10/25/2024 0156  Gross per 24 hour   Intake 500 ml    Output 420 ml   Net 80 ml       Physical Exam  Vitals and nursing note reviewed.   Constitutional:       General: She is not in acute distress.     Appearance: She is well-developed. She is ill-appearing.      Comments: Nonverbal at baseline   HENT:      Head: Normocephalic and atraumatic.      Mouth/Throat:      Comments: Dry lips  Eyes:      Conjunctiva/sclera: Conjunctivae normal.   Cardiovascular:      Rate and Rhythm: Normal rate and regular rhythm.      Heart sounds: No murmur heard.  Pulmonary:      Effort: Pulmonary effort is normal. No respiratory distress.      Breath sounds: Normal breath sounds.   Abdominal:      Palpations: Abdomen is soft.      Tenderness: There is no abdominal tenderness.   Musculoskeletal:         General: No swelling.      Cervical back: Neck supple.   Skin:     General: Skin is warm and dry.   Neurological:      Mental Status: She is alert. Mental status is at baseline.   Psychiatric:         Mood and Affect: Mood normal.       Lines/Drains:              Lab Results: I have reviewed the following results:   Results from last 7 days   Lab Units 10/25/24  0520 10/19/24  0436 10/18/24  1959   WBC Thousand/uL 9.44   < > 6.82   HEMOGLOBIN g/dL 8.5*   < > 10.4*   HEMATOCRIT % 29.9*   < > 32.4*   PLATELETS Thousands/uL 160   < > 156   BANDS PCT % 1   < >  --    SEGS PCT %  --   --  68   LYMPHO PCT % 25   < > 21   MONO PCT % 2*   < > 10   EOS PCT % 2   < > 1    < > = values in this interval not displayed.     Results from last 7 days   Lab Units 10/25/24  0817 10/25/24  0520 10/24/24  0802 10/23/24  0520 10/20/24  0532 10/19/24  0436   SODIUM mmol/L  --  145   < > 144   < > 145   POTASSIUM mmol/L 4.8 6.1*   < > 4.7   < > 4.3   CHLORIDE mmol/L  --  118*   < > 110*   < > 109*   CO2 mmol/L  --  23   < > 22   < > 28   BUN mg/dL  --  44*   < > 67*   < > 61*   CREATININE mg/dL  --  1.43*   < > 2.08*   < > 2.62*   ANION GAP mmol/L  --  4   < > 12   < > 8   CALCIUM mg/dL  --  7.9*   < > 8.5   <  > 12.4*   ALBUMIN g/dL  --   --   --  3.1*   < > 3.7   TOTAL BILIRUBIN mg/dL  --   --   --   --   --  0.36   ALK PHOS U/L  --   --   --   --   --  47   ALT U/L  --   --   --   --   --  <3*   AST U/L  --   --   --   --   --  3*   GLUCOSE RANDOM mg/dL  --  99   < > 323*   < > 146*    < > = values in this interval not displayed.     Results from last 7 days   Lab Units 10/22/24  1209   INR  0.98     Results from last 7 days   Lab Units 10/25/24  1208 10/25/24  1032 10/25/24  0823 10/25/24  0545 10/25/24  0011 10/24/24  2257 10/24/24  1934 10/24/24  1730 10/24/24  1443 10/24/24  1156 10/24/24  1024 10/24/24  0956   POC GLUCOSE mg/dl 120 160* 74 103 176* 193* 203* 142* 132 154* 217* 67         Results from last 7 days   Lab Units 10/24/24  0802 10/22/24  0506   PROCALCITONIN ng/ml 3.26* 4.79*       Recent Cultures (last 7 days):   Results from last 7 days   Lab Units 10/22/24  1046 10/22/24  0902   BLOOD CULTURE   --  No Growth at 48 hrs.  No Growth at 48 hrs.   URINE CULTURE  >100,000 cfu/ml Morganella morganii*  --        Imaging Results Review: I reviewed radiology reports from this admission including: CT chest and CT abdomen/pelvis.      Last 24 Hours Medication List:     Current Facility-Administered Medications:     acetaminophen (Ofirmev) IVPB 600 mg, Q6H PRN, Last Rate: 600 mg (10/25/24 0337)    ARIPiprazole (ABILIFY) tablet 2 mg, HS    ascorbic acid (VITAMIN C) tablet 1,000 mg, Daily    baclofen tablet 10 mg, TID    carbidopa-levodopa (SINEMET CR)  mg per ER tablet 2 tablet, BID    ceftriaxone (ROCEPHIN) 1 g/50 mL in dextrose IVPB, Q24H, Last Rate: 1,000 mg (10/24/24 1456)    citalopram (CeleXA) tablet 20 mg, Daily    Ferrous Sulfate oral syrup 300 mg, Every Other Day    heparin (porcine) subcutaneous injection 5,000 Units, Q8H [facility]    influenza vaccine, recombinant (PF) (Flublok) IM injection 0.5 mL, Once    insulin lispro (HumALOG/ADMELOG) 100 units/mL subcutaneous injection 1-5 Units, Q6H [facility] **AND**  Fingerstick Glucose (POCT), Q6H    insulin regular (HumuLIN R,NovoLIN R) injection 4 Units, BID    Ketotifen Fumarate (ZADITOR) 0.035 % ophthalmic solution 1 drop, BID PRN    levothyroxine tablet 25 mcg, Early Morning    LORazepam (ATIVAN) tablet 0.5 mg, HS    midodrine (PROAMATINE) tablet 5 mg, TID PRN    oxybutynin (DITROPAN) tablet 5 mg, TID    pantoprazole (PROTONIX) injection 20 mg, Q24H ROSEMARY    polyethylene glycol (MIRALAX) packet 17 g, Daily    pravastatin (PRAVACHOL) tablet 40 mg, Daily With Dinner    senna-docusate sodium (SENOKOT S) 8.6-50 mg per tablet 1 tablet, HS    sodium chloride 0.9 % bolus 1,000 mL, Once    traZODone (DESYREL) tablet 100 mg, HS PRN    valproic acid (DEPAKENE) oral soln 125 mg, BID    Administrative Statements   Today, Patient Was Seen By: Keiry Ricardo MD      **Please Note: This note may have been constructed using a voice recognition system.**

## 2024-10-25 NOTE — ASSESSMENT & PLAN NOTE
Urine culture growing more than 100,000 Morganella  Currently on IV ceftriaxone per infectious disease

## 2024-10-25 NOTE — ASSESSMENT & PLAN NOTE
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

## 2024-10-25 NOTE — ASSESSMENT & PLAN NOTE
Management per primary team  Control hyperglycemia as this can cause volume depletion and worsening of SCAR   PDMP reviewed, refill provided.

## 2024-10-25 NOTE — PROGRESS NOTES
NEPHROLOGY HOSPITAL PROGRESS NOTE   Terrie Alicea 64 y.o. 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)  Ionized calcium level today mildly low at 1.09  Status post calcitonin x 4 doses  Continue free water flushes, increase to 250 cc every 4 hours  Continue to monitor ionized calcium level  No need for calcium supplement at this time but she may need calcium supplementation if ionized calcium continues to drop  SCAR (acute kidney injury) (HCC)  Etiology: Likely secondary to vasoconstriction and hemodynamic changes in setting of hypercalcemia leading to ATN, now having insensible losses in setting of ongoing fevers  Baseline creatinine 0.8  Creatinine on admission 2.83  Creatinine edith 2.26 on 10/21  Creatinine increased to 2.31 on 10/22  Creatinine today improved to 1.43  UA: No hematuria  Renal imaging: No hydronephrosis  Continue free water flushes at 250 cc every 4 hours  No need for further IV fluids  Cerebral palsy (HCC)  Management per primary team  Diabetes mellitus type 2, insulin dependent (HCC)  Management per primary team  Control hyperglycemia as this can cause volume depletion and worsening of SCAR  Iron deficiency anemia  Hemoglobin 8.5 today  Status post IV Venofer 300 mg x 1 on 10/21  Ferritin 116/iron saturation 19%  Transfuse for hemoglobin less than 7  Metabolic alkalosis  Currently resolved, serum bicarbonate level 23 today  Hypophosphatemia  Serum phosphorus level improved to 3.2 today  No need for phosphorus supplementation today  Sepsis (HCC)  Patient has been having fevers  COVID/influenza negative  RSV negative and urine culture is pending  Blood  cultures are pending  CT showing multilobar pneumonia 10/24  Currently on IV ceftriaxone per infectious disease  UTI (urinary tract infection)  Urine culture growing more than 100,000 Morganella  Currently on IV ceftriaxone per infectious disease    Discussed with internal medicine team.  After discussion, we agreed that kidney function continues to improve and to continue free water flushes through PEG tube at current rate.    SUBJECTIVE / 24H INTERVAL HISTORY:  Patient is nonverbal at baseline.  However she recognizes her name and opens her eyes upon command.    OBJECTIVE:  Current Weight: Weight - Scale: 40 kg (88 lb 2.9 oz)  Vitals:    10/25/24 0011 10/25/24 0155 10/25/24 0410 10/25/24 0926   BP:    124/68   BP Location:       Pulse: 93 91 71 78   Resp:    16   Temp: (!) 101.2 °F (38.4 °C) (!) 100.6 °F (38.1 °C) 98.9 °F (37.2 °C) 100 °F (37.8 °C)   TempSrc:       SpO2: 98% 95% 98% 98%   Weight:           Intake/Output Summary (Last 24 hours) at 10/25/2024 1355  Last data filed at 10/25/2024 0156  Gross per 24 hour   Intake 500 ml   Output 420 ml   Net 80 ml       Review of Systems   Unable to perform ROS: Patient nonverbal     Physical Exam  Vitals and nursing note reviewed.   Constitutional:       Appearance: She is well-developed. She is ill-appearing.   HENT:      Head: Normocephalic and atraumatic.   Eyes:      Conjunctiva/sclera: Conjunctivae normal.   Cardiovascular:      Rate and Rhythm: Normal rate and regular rhythm.      Pulses: Normal pulses.      Heart sounds: Normal heart sounds. No murmur heard.  Pulmonary:      Effort: Pulmonary effort is normal. No respiratory distress.      Breath sounds: Normal breath sounds.   Abdominal:      Palpations: Abdomen is soft.      Tenderness: There is no abdominal tenderness.      Comments: PEG tube present   Musculoskeletal:         General: No swelling.      Cervical back: Neck supple.   Skin:     General: Skin is warm and dry.      Capillary Refill: Capillary  refill takes less than 2 seconds.   Neurological:      Mental Status: Mental status is at baseline.   Psychiatric:         Mood and Affect: Mood normal.       Medications:    Current Facility-Administered Medications:     acetaminophen (Ofirmev) IVPB 600 mg, 15 mg/kg, Intravenous, Q6H PRN, Aden Hastings MD, Last Rate: 240 mL/hr at 10/25/24 0337, 600 mg at 10/25/24 0337    ARIPiprazole (ABILIFY) tablet 2 mg, 2 mg, Per G Tube, HS, Leonidas Sharma MD, 2 mg at 10/24/24 2137    ascorbic acid (VITAMIN C) tablet 1,000 mg, 1,000 mg, Per G Tube, Daily, Leonidas Sharma MD, 1,000 mg at 10/25/24 0901    baclofen tablet 10 mg, 10 mg, Per G Tube, TID, Keiry Ricardo MD, 10 mg at 10/25/24 1024    carbidopa-levodopa (SINEMET CR)  mg per ER tablet 2 tablet, 2 tablet, Oral, BID, Leonidas Sharma MD, 2 tablet at 10/25/24 0901    ceftriaxone (ROCEPHIN) 1 g/50 mL in dextrose IVPB, 1,000 mg, Intravenous, Q24H, Keiry Ricardo MD, Last Rate: 100 mL/hr at 10/24/24 1456, 1,000 mg at 10/24/24 1456    citalopram (CeleXA) tablet 20 mg, 20 mg, Per G Tube, Daily, Leonidas Sharma MD, 20 mg at 10/25/24 0901    Ferrous Sulfate oral syrup 300 mg, 300 mg, Per G Tube, Every Other Day, Leonidas Sharma MD, 300 mg at 10/25/24 0900    heparin (porcine) subcutaneous injection 5,000 Units, 5,000 Units, Subcutaneous, Q8H ROSEMARY, Keiry Ricardo MD, 5,000 Units at 10/25/24 0523    influenza vaccine, recombinant (PF) (Flublok) IM injection 0.5 mL, 0.5 mL, Intramuscular, Once, Nicolas Lopez DO    insulin lispro (HumALOG/ADMELOG) 100 units/mL subcutaneous injection 1-5 Units, 1-5 Units, Subcutaneous, Q6H ROSEMARY, 1 Units at 10/25/24 0023 **AND** Fingerstick Glucose (POCT), , , Q6H, Leonidas Sharma MD    insulin regular (HumuLIN R,NovoLIN R) injection 4 Units, 4 Units, Subcutaneous, BID, Keiry Ricardo MD    Ketotifen Fumarate (ZADITOR) 0.035 % ophthalmic solution 1 drop, 1 drop, Both Eyes, BID PRN, Leonidas Sharma MD    levothyroxine tablet 25 mcg, 25 mcg, Per G Tube, Early Morning, Leonidas  MD Zachary, 25 mcg at 10/25/24 0523    LORazepam (ATIVAN) tablet 0.5 mg, 0.5 mg, Per G Tube, HS, Keiry Ricardo MD, 0.5 mg at 10/24/24 2136    midodrine (PROAMATINE) tablet 5 mg, 5 mg, Per G Tube, TID PRN, Leonidas Sharma MD    oxybutynin (DITROPAN) tablet 5 mg, 5 mg, Per G Tube, TID, Leonidas Sharma MD, 5 mg at 10/25/24 0901    pantoprazole (PROTONIX) injection 20 mg, 20 mg, Intravenous, Q24H ROSEMARY, Leonidas Sharma MD, 20 mg at 10/25/24 0900    polyethylene glycol (MIRALAX) packet 17 g, 17 g, Per G Tube, Daily, Keiry Ricardo MD, 17 g at 10/25/24 0900    pravastatin (PRAVACHOL) tablet 40 mg, 40 mg, Per G Tube, Daily With Dinner, Leonidas Sharma MD, 40 mg at 10/24/24 1734    senna-docusate sodium (SENOKOT S) 8.6-50 mg per tablet 1 tablet, 1 tablet, Per G Tube, HS, Yolanda Kay, , 1 tablet at 10/24/24 2136    sodium chloride 0.9 % bolus 1,000 mL, 1,000 mL, Intravenous, Once, Leonidas Sharma MD    traZODone (DESYREL) tablet 100 mg, 100 mg, Per G Tube, HS PRN, Jennifer Hernandez MD    valproic acid (DEPAKENE) oral soln 125 mg, 125 mg, Per G Tube, BID, Leonidas Sharma MD, 125 mg at 10/25/24 0901    Laboratory Results:  Results from last 7 days   Lab Units 10/25/24  0817 10/25/24  0520 10/24/24  0802 10/23/24  0520 10/22/24  0506 10/21/24  0525 10/20/24  0532 10/19/24  0436 10/19/24  0037 10/18/24  1959   WBC Thousand/uL  --  9.44 13.01* 11.50* 6.26 5.82  --  6.25  --  6.82   HEMOGLOBIN g/dL  --  8.5* 8.1* 7.9* 9.9* 9.1*  --  9.4*  --  10.4*   HEMATOCRIT %  --  29.9* 25.7* 25.2* 32.0* 29.1*  --  29.9*  --  32.4*   PLATELETS Thousands/uL  --  160 175 131* 151 127*  --  145*  --  156   POTASSIUM mmol/L 4.8 6.1* 3.9 4.7 3.9 4.6 4.6 4.3  --  4.5   CHLORIDE mmol/L  --  118* 113* 110* 112* 108 108 109*  --  99   CO2 mmol/L  --  23 26 22 22 27 29 28  --  36*   BUN mg/dL  --  44* 49* 67* 60* 52* 50* 61*  --  70*   CREATININE mg/dL  --  1.43* 1.57* 2.08* 2.31* 2.26* 2.49* 2.62*  --  2.86*   CALCIUM mg/dL  --  7.9* 8.4 8.5 9.7 9.6 10.1 12.4*    "< > 14.8*   MAGNESIUM mg/dL  --  2.0  --  2.1 2.0 1.9 2.1  --   --   --    PHOSPHORUS mg/dL  --  3.2 2.0* 3.3 1.1* 2.5 3.2  --   --   --     < > = values in this interval not displayed.       Portions of the record may have been created with voice recognition software. Occasional wrong word or \"sound a like\" substitutions may have occurred due to the inherent limitations of voice recognition software. Read the chart carefully and recognize, using context, where substitutions have occurred.If you have any questions, please contact the dictating provider.    "

## 2024-10-25 NOTE — ASSESSMENT & PLAN NOTE
Patient has been having fevers  COVID/influenza negative  RSV negative and urine culture is pending  Blood cultures are pending  CT showing multilobar pneumonia 10/24  Currently on IV ceftriaxone per infectious disease

## 2024-10-25 NOTE — ASSESSMENT & PLAN NOTE
10/21 Met SIRS criteria with fever, tachycardia  10/21 CXR - Faint inferior perihilar patchiness with subtle obscuration of the right cardiomediastinal silhouette could represent findings of aspiration in the appropriate clinical context.  Urinalysis - innumerable leukocytes and moderate bacteria on microscopy  Urine culture growing Morganella (susceptible to ceftriaxone)  Blood cultures negative to date  Elevated procalcitonin, bandemia  CT CAP - Multilobar pneumonia. No acute findings in the abdomen or pelvis. Prominent rectal vault stool without bowel obstruction.    Plan  Infectious disease consulted, appreciate recommendations  Continue IV Rocephin (Day 4). Likely additional 1-2 day course.  Patient continues to have recurrent fevers  Monitor fever curve

## 2024-10-25 NOTE — ASSESSMENT & PLAN NOTE
At baseline is non verbal , totally dependent on care, resides in intermediate care facility  Continue PTA baclofen TID

## 2024-10-25 NOTE — ASSESSMENT & PLAN NOTE
Recent Labs     10/23/24  0520 10/24/24  0802 10/25/24  0520   CREATININE 2.08* 1.57* 1.43*   EGFR 24 35 39     Estimated Creatinine Clearance: 25.8 mL/min (A) (by C-G formula based on SCr of 1.43 mg/dL (H)).    POA 2.83; (baseline 0.8-1)  US kidney and bladder - Bilateral small echogenic kidneys of medical renal disease.   Per Nephrology, likely secondary to vasoconstriction and hemodynamic changes in setting of hypercalcemia     Plan:  Nephrology consulted, appreciate recommendations  Free water flushes increased  Monitor I/Os  Avoid nephrotoxins  Avoid hypoperfusion

## 2024-10-26 LAB
ALBUMIN SERPL BCG-MCNC: 3.5 G/DL (ref 3.5–5)
ALP SERPL-CCNC: 60 U/L (ref 34–104)
ALT SERPL W P-5'-P-CCNC: 14 U/L (ref 7–52)
ANION GAP SERPL CALCULATED.3IONS-SCNC: 6 MMOL/L (ref 4–13)
AST SERPL W P-5'-P-CCNC: 37 U/L (ref 13–39)
BILIRUB SERPL-MCNC: 0.19 MG/DL (ref 0.2–1)
BUN SERPL-MCNC: 44 MG/DL (ref 5–25)
CA-I BLD-SCNC: 1.1 MMOL/L (ref 1.12–1.32)
CALCIUM SERPL-MCNC: 8 MG/DL (ref 8.4–10.2)
CHLORIDE SERPL-SCNC: 114 MMOL/L (ref 96–108)
CO2 SERPL-SCNC: 23 MMOL/L (ref 21–32)
CREAT SERPL-MCNC: 1.32 MG/DL (ref 0.6–1.3)
ERYTHROCYTE [DISTWIDTH] IN BLOOD BY AUTOMATED COUNT: 13.6 % (ref 11.6–15.1)
GFR SERPL CREATININE-BSD FRML MDRD: 43 ML/MIN/1.73SQ M
GLUCOSE SERPL-MCNC: 155 MG/DL (ref 65–140)
GLUCOSE SERPL-MCNC: 165 MG/DL (ref 65–140)
GLUCOSE SERPL-MCNC: 206 MG/DL (ref 65–140)
GLUCOSE SERPL-MCNC: 246 MG/DL (ref 65–140)
GLUCOSE SERPL-MCNC: 260 MG/DL (ref 65–140)
HCT VFR BLD AUTO: 31.1 % (ref 34.8–46.1)
HGB BLD-MCNC: 9.6 G/DL (ref 11.5–15.4)
MCH RBC QN AUTO: 33 PG (ref 26.8–34.3)
MCHC RBC AUTO-ENTMCNC: 30.9 G/DL (ref 31.4–37.4)
MCV RBC AUTO: 107 FL (ref 82–98)
PLATELET # BLD AUTO: 214 THOUSANDS/UL (ref 149–390)
PMV BLD AUTO: 12.1 FL (ref 8.9–12.7)
POTASSIUM SERPL-SCNC: 5.4 MMOL/L (ref 3.5–5.3)
PROT SERPL-MCNC: 7.2 G/DL (ref 6.4–8.4)
RBC # BLD AUTO: 2.91 MILLION/UL (ref 3.81–5.12)
SODIUM SERPL-SCNC: 143 MMOL/L (ref 135–147)
WBC # BLD AUTO: 10.43 THOUSAND/UL (ref 4.31–10.16)

## 2024-10-26 PROCEDURE — 80053 COMPREHEN METABOLIC PANEL: CPT | Performed by: INTERNAL MEDICINE

## 2024-10-26 PROCEDURE — 82948 REAGENT STRIP/BLOOD GLUCOSE: CPT

## 2024-10-26 PROCEDURE — 82330 ASSAY OF CALCIUM: CPT | Performed by: INTERNAL MEDICINE

## 2024-10-26 PROCEDURE — 99233 SBSQ HOSP IP/OBS HIGH 50: CPT | Performed by: INTERNAL MEDICINE

## 2024-10-26 PROCEDURE — 85027 COMPLETE CBC AUTOMATED: CPT | Performed by: INTERNAL MEDICINE

## 2024-10-26 PROCEDURE — 99232 SBSQ HOSP IP/OBS MODERATE 35: CPT | Performed by: INTERNAL MEDICINE

## 2024-10-26 RX ADMIN — VALPROIC ACID 125 MG: 250 SOLUTION ORAL at 21:52

## 2024-10-26 RX ADMIN — INSULIN LISPRO 1 UNITS: 100 INJECTION, SOLUTION INTRAVENOUS; SUBCUTANEOUS at 18:09

## 2024-10-26 RX ADMIN — HEPARIN SODIUM 5000 UNITS: 5000 INJECTION INTRAVENOUS; SUBCUTANEOUS at 21:52

## 2024-10-26 RX ADMIN — POLYETHYLENE GLYCOL 3350 17 G: 17 POWDER, FOR SOLUTION ORAL at 09:51

## 2024-10-26 RX ADMIN — PANTOPRAZOLE SODIUM 20 MG: 40 INJECTION, POWDER, FOR SOLUTION INTRAVENOUS at 09:51

## 2024-10-26 RX ADMIN — CEFTRIAXONE 1000 MG: 2 INJECTION, POWDER, FOR SOLUTION INTRAMUSCULAR; INTRAVENOUS at 15:21

## 2024-10-26 RX ADMIN — INSULIN LISPRO 1 UNITS: 100 INJECTION, SOLUTION INTRAVENOUS; SUBCUTANEOUS at 00:02

## 2024-10-26 RX ADMIN — INSULIN LISPRO 2 UNITS: 100 INJECTION, SOLUTION INTRAVENOUS; SUBCUTANEOUS at 13:38

## 2024-10-26 RX ADMIN — OXYBUTYNIN CHLORIDE 5 MG: 5 TABLET ORAL at 15:20

## 2024-10-26 RX ADMIN — Medication 600 MG: at 23:28

## 2024-10-26 RX ADMIN — CITALOPRAM HYDROBROMIDE 20 MG: 20 TABLET, FILM COATED ORAL at 09:51

## 2024-10-26 RX ADMIN — INSULIN HUMAN 4 UNITS: 100 INJECTION, SOLUTION PARENTERAL at 00:02

## 2024-10-26 RX ADMIN — BACLOFEN 10 MG: 10 TABLET ORAL at 21:52

## 2024-10-26 RX ADMIN — INSULIN LISPRO 2 UNITS: 100 INJECTION, SOLUTION INTRAVENOUS; SUBCUTANEOUS at 23:30

## 2024-10-26 RX ADMIN — PRAVASTATIN SODIUM 40 MG: 40 TABLET ORAL at 15:34

## 2024-10-26 RX ADMIN — CARBIDOPA AND LEVODOPA 2 TABLET: 25; 100 TABLET, EXTENDED RELEASE ORAL at 18:08

## 2024-10-26 RX ADMIN — OXYBUTYNIN CHLORIDE 5 MG: 5 TABLET ORAL at 21:52

## 2024-10-26 RX ADMIN — SENNOSIDES AND DOCUSATE SODIUM 1 TABLET: 50; 8.6 TABLET ORAL at 21:52

## 2024-10-26 RX ADMIN — OXYCODONE HYDROCHLORIDE AND ACETAMINOPHEN 1000 MG: 500 TABLET ORAL at 09:51

## 2024-10-26 RX ADMIN — LEVOTHYROXINE SODIUM 25 MCG: 25 TABLET ORAL at 05:58

## 2024-10-26 RX ADMIN — BACLOFEN 10 MG: 10 TABLET ORAL at 09:51

## 2024-10-26 RX ADMIN — CARBIDOPA AND LEVODOPA 2 TABLET: 25; 100 TABLET, EXTENDED RELEASE ORAL at 09:51

## 2024-10-26 RX ADMIN — VALPROIC ACID 125 MG: 250 SOLUTION ORAL at 09:51

## 2024-10-26 RX ADMIN — INSULIN LISPRO 1 UNITS: 100 INJECTION, SOLUTION INTRAVENOUS; SUBCUTANEOUS at 05:58

## 2024-10-26 RX ADMIN — HEPARIN SODIUM 5000 UNITS: 5000 INJECTION INTRAVENOUS; SUBCUTANEOUS at 15:20

## 2024-10-26 RX ADMIN — OXYBUTYNIN CHLORIDE 5 MG: 5 TABLET ORAL at 09:51

## 2024-10-26 RX ADMIN — INSULIN HUMAN 15 UNITS: 100 INJECTION, SUSPENSION SUBCUTANEOUS at 18:09

## 2024-10-26 RX ADMIN — HEPARIN SODIUM 5000 UNITS: 5000 INJECTION INTRAVENOUS; SUBCUTANEOUS at 05:59

## 2024-10-26 RX ADMIN — Medication 600 MG: at 16:01

## 2024-10-26 RX ADMIN — BACLOFEN 10 MG: 10 TABLET ORAL at 15:20

## 2024-10-26 RX ADMIN — ARIPIPRAZOLE 2 MG: 2 TABLET ORAL at 21:53

## 2024-10-26 RX ADMIN — LORAZEPAM 0.5 MG: 0.5 TABLET ORAL at 21:52

## 2024-10-26 NOTE — ASSESSMENT & PLAN NOTE
-Admission calcium 14.2  - Etiology multifactorial in setting of volume depletion, immobilization, calcium supplements  - Started on intravenous fluids and calcitonin initially  - PTH-appropriately suppressed  - Vitamin D 25 level-91  - PTH RP-  - SPEP-immunofixation no monoclonal band  - UPEP-  - Kappa/lambda ratio 1.2    Overall, calcium appears to be stabilizing at 8.  Creatinine improving 1.3.  Potassium borderline elevated 5.4.  Sodium level improved to 143 from hypernatremia.    Plan  - Follow-up PTH RP  - Trend calcium for now  - Continue free water flush  - Please change tube feeds to low potassium feeds  - BMP in a.m.  - If potassium remains elevated may need to consider Lokelma

## 2024-10-26 NOTE — ASSESSMENT & PLAN NOTE
10/21 Met SIRS criteria with fever, tachycardia  10/21 CXR - Faint inferior perihilar patchiness with subtle obscuration of the right cardiomediastinal silhouette could represent findings of aspiration in the appropriate clinical context.  Urinalysis - innumerable leukocytes and moderate bacteria on microscopy  Urine culture growing Morganella (susceptible to ceftriaxone)  Blood cultures negative to date  Elevated procalcitonin, bandemia  CT CAP - Multilobar pneumonia. No acute findings in the abdomen or pelvis. Prominent rectal vault stool without bowel obstruction.    Plan  Infectious disease consulted, appreciate recommendations  Continue IV Rocephin (Day 5).   Follow-up ID recommendations, plan to transition the patient to cefpodoxime on discharge through 10/28/2024.

## 2024-10-26 NOTE — ASSESSMENT & PLAN NOTE
Recent Labs     10/24/24  0802 10/25/24  0520 10/26/24  0424   CREATININE 1.57* 1.43* 1.32*   EGFR 35 39 43     Estimated Creatinine Clearance: 27.9 mL/min (A) (by C-G formula based on SCr of 1.32 mg/dL (H)).    POA 2.83; (baseline 0.8-1)  US kidney and bladder - Bilateral small echogenic kidneys of medical renal disease.   Per Nephrology, likely secondary to vasoconstriction and hemodynamic changes in setting of hypercalcemia     Plan:  Nephrology consulted, appreciate recommendations  Free water flushes increased  Monitor I/Os  Avoid nephrotoxins  Avoid hypoperfusion  Creatinine improving.

## 2024-10-26 NOTE — ASSESSMENT & PLAN NOTE
-Likely secondary to hypercalcemia/volume depletion leading to prerenal/ATN etiology  - Baseline creatinine 0.8 mg/dL  - Admission creatinine 2.8 mg/dL  - Urinalysis without hematuria  - Renal imaging-no hydronephrosis  - Initially started on intravenous fluids and renal function is improving

## 2024-10-26 NOTE — ASSESSMENT & PLAN NOTE
Recent Labs     10/24/24  0802 10/25/24  0520 10/26/24  0424   HGB 8.1* 8.5* 9.6*   Baseline: 9-11, currently hemoglobin is baseline  Ordered iron studies  Daily CBC  Continue PTA ferrous sulfate  Transfuse if hemoglobin less than 7

## 2024-10-26 NOTE — ASSESSMENT & PLAN NOTE
-Chest x-ray possible opacity  - VQ scan-low probability of PE  - Concern for ongoing fevers.  Workup in progress by primary team    Hyperkalemia-potassium 5.4 this morning.  Renal function is overall improving.  Improvement in blood sugars control will help potassium control also.  Would also recommend changing tube feeds to low potassium feeds

## 2024-10-26 NOTE — PROGRESS NOTES
Progress Note - Nephrology   Name: Terrie Alicea 64 y.o. female I MRN: [id number]  Unit/Bed#: S -01 I Date of Admission: 10/18/2024   Date of Service: 10/26/2024 I Hospital Day: 8    Assessment & Plan  Hypercalcemia  -Admission calcium 14.2  - Etiology multifactorial in setting of volume depletion, immobilization, calcium supplements  - Started on intravenous fluids and calcitonin initially  - PTH-appropriately suppressed  - Vitamin D 25 level-91  - PTH RP-  - SPEP-immunofixation no monoclonal band  - UPEP-  - Kappa/lambda ratio 1.2    Overall, calcium appears to be stabilizing at 8.  Creatinine improving 1.3.  Potassium borderline elevated 5.4.  Sodium level improved to 143 from hypernatremia.    Plan  - Follow-up PTH RP  - Trend calcium for now  - Continue free water flush  - Please change tube feeds to low potassium feeds  - BMP in a.m.  - If potassium remains elevated may need to consider Lokelma    SCAR (acute kidney injury) (HCC)  -Likely secondary to hypercalcemia/volume depletion leading to prerenal/ATN etiology  - Baseline creatinine 0.8 mg/dL  - Admission creatinine 2.8 mg/dL  - Urinalysis without hematuria  - Renal imaging-no hydronephrosis  - Initially started on intravenous fluids and renal function is improving  Metabolic alkalosis  -Initially with metabolic alkalosis.  Bicarbonate improving  Cerebral palsy (HCC)  -Per primary team  Diabetes mellitus type 2, insulin dependent (HCC)  -Per primary team  Iron deficiency anemia  -Per primary team  - Received IV Venofer 10/21  Hypophosphatemia  -Received intermittent phosphorus repletion due to hypophosphatemia  Sepsis (HCC)  -Management per primary team  - ID team on board  - On ceftriaxone  UTI (urinary tract infection)  -Urine culture with Morganella  - On ceftriaxone  Pneumonia  -Chest x-ray possible opacity  - VQ scan-low probability of PE  - Concern for ongoing fevers.  Workup in progress by primary team    Hyperkalemia-potassium 5.4 this  morning.  Renal function is overall improving.  Improvement in blood sugars control will help potassium control also.  Would also recommend changing tube feeds to low potassium feeds    I have reviewed the nephrology recommendations including changing feeds to low potassium feeds, with primary team resident, and we are in agreement with renal plan including the information outlined above.     Subjective   Brief History of Admission -64-year-old female with a past medical history of cerebral palsy, diabetes, Parkinson, hyperlipidemia who initially presents with hypercalcemia and SCAR.  Nephrology is consulted for SCAR and hypercalcemia.  Course has been complicated by sepsis and fevers.  Hypercalcemia has resolved    Blood pressures 1 17-1 31 systolic overnight and this morning.  Did have 1 time decreased to 90 systolic last night.  On room air.  Patient is nonverbal.  Is awake.  Does not appear to be in distress.  Last fever was 100.6 at 1 AM on 10/25    Objective :  Temp:  [98.1 °F (36.7 °C)-99.5 °F (37.5 °C)] 99.1 °F (37.3 °C)  HR:  [74-91] 91  BP: ()/(66-84) 131/84  Resp:  [15-18] 18  SpO2:  [94 %-99 %] 94 %  O2 Device: None (Room air)    Current Weight: Weight - Scale: 40 kg (88 lb 2.9 oz)  First Weight: Weight - Scale: 40 kg (88 lb 2.9 oz)  I/O         10/24 0701  10/25 0700 10/25 0701  10/26 0700 10/26 0701  10/27 0700    NG/ 250     Total Intake(mL/kg) 670 (16.8) 250 (6.3)     Urine (mL/kg/hr) 420 (0.4)      Total Output 420      Net +250 +250            Unmeasured Urine Occurrence  1 x     Unmeasured Stool Occurrence  1 x           Physical Exam  General: NAD  Skin: no rash  Eyes: anicteric sclera  ENT: Dry mucous membrane  Neck: supple  Chest: CTA b/l, no ronchii, no wheeze, no rubs, no rales but limited exam  CVS: s1s2, no murmur, no gallop, no rub  Abdomen: soft, nl sounds  Extremities: no edema LE b/l chronic contractures  : no boateng  Neuro: Awake but noncommunicative so difficult to  assess  Psych: Cannot assess due to patient nonverbal    Medications:    Current Facility-Administered Medications:     acetaminophen (Ofirmev) IVPB 600 mg, 15 mg/kg, Intravenous, Q6H PRN, Aden Hastings MD, Last Rate: 240 mL/hr at 10/25/24 0337, 600 mg at 10/25/24 0337    ARIPiprazole (ABILIFY) tablet 2 mg, 2 mg, Per G Tube, HS, Leonidas Sharma MD, 2 mg at 10/25/24 2114    ascorbic acid (VITAMIN C) tablet 1,000 mg, 1,000 mg, Per G Tube, Daily, Leonidas Sharma MD, 1,000 mg at 10/26/24 0951    baclofen tablet 10 mg, 10 mg, Per G Tube, TID, Keiry Ricardo MD, 10 mg at 10/26/24 0951    carbidopa-levodopa (SINEMET CR)  mg per ER tablet 2 tablet, 2 tablet, Oral, BID, Leonidas Sharma MD, 2 tablet at 10/26/24 0951    ceftriaxone (ROCEPHIN) 1 g/50 mL in dextrose IVPB, 1,000 mg, Intravenous, Q24H, Keiry Ricardo MD, Last Rate: 100 mL/hr at 10/25/24 1410, 1,000 mg at 10/25/24 1410    citalopram (CeleXA) tablet 20 mg, 20 mg, Per G Tube, Daily, Leonidas Sharma MD, 20 mg at 10/26/24 0951    Ferrous Sulfate oral syrup 300 mg, 300 mg, Per G Tube, Every Other Day, Leonidas Sharma MD, 300 mg at 10/25/24 0900    heparin (porcine) subcutaneous injection 5,000 Units, 5,000 Units, Subcutaneous, Q8H ROSEMARY, Keiry Ricardo MD, 5,000 Units at 10/26/24 0559    influenza vaccine, recombinant (PF) (Flublok) IM injection 0.5 mL, 0.5 mL, Intramuscular, Once, Nicolas Lopez DO    insulin lispro (HumALOG/ADMELOG) 100 units/mL subcutaneous injection 1-5 Units, 1-5 Units, Subcutaneous, Q6H ROSEMARY, 1 Units at 10/26/24 0558 **AND** Fingerstick Glucose (POCT), , , Q6H, Leonidas Sharma MD    insulin NPH (HumuLIN N,NovoLIN N) 100 Units/mL subcutaneous injection 15 Units, 15 Units, Subcutaneous, Q24H [facility], Jennifer Hernandez MD    Ketotifen Fumarate (ZADITOR) 0.035 % ophthalmic solution 1 drop, 1 drop, Both Eyes, BID PRN, Leonidas Sharma MD    levothyroxine tablet 25 mcg, 25 mcg, Per G Tube, Early Morning, Leonidas Sharma MD, 25 mcg at 10/26/24 0558    LORazepam (ATIVAN) tablet  0.5 mg, 0.5 mg, Per G Tube, HS, Keiry Ricardo MD, 0.5 mg at 10/25/24 2114    midodrine (PROAMATINE) tablet 5 mg, 5 mg, Per G Tube, TID PRN, Leonidas Sharma MD    oxybutynin (DITROPAN) tablet 5 mg, 5 mg, Per G Tube, TID, Leonidas Sharma MD, 5 mg at 10/26/24 0951    pantoprazole (PROTONIX) injection 20 mg, 20 mg, Intravenous, Q24H ROSEMARY, Leonidas Sharma MD, 20 mg at 10/26/24 0951    polyethylene glycol (MIRALAX) packet 17 g, 17 g, Per G Tube, Daily, Keiry Ricardo MD, 17 g at 10/26/24 0951    pravastatin (PRAVACHOL) tablet 40 mg, 40 mg, Per G Tube, Daily With Dinner, Leonidas Sharma MD, 40 mg at 10/25/24 1701    senna-docusate sodium (SENOKOT S) 8.6-50 mg per tablet 1 tablet, 1 tablet, Per G Tube, HS, Yolanda Kay, , 1 tablet at 10/25/24 2114    sodium chloride 0.9 % bolus 1,000 mL, 1,000 mL, Intravenous, Once, Leonidas Sharma MD    traZODone (DESYREL) tablet 100 mg, 100 mg, Per G Tube, HS PRN, Jennifer Hernandez MD    valproic acid (DEPAKENE) oral soln 125 mg, 125 mg, Per G Tube, BID, Leonidas Sharma MD, 125 mg at 10/26/24 0951      Lab Results: I have reviewed the following results:  Results from last 7 days   Lab Units 10/26/24  0424 10/25/24  0817 10/25/24  0520 10/24/24  0802 10/23/24  0520 10/22/24  0506 10/21/24  0525 10/20/24  0532   WBC Thousand/uL 10.43*  --  9.44 13.01* 11.50* 6.26 5.82  --    HEMOGLOBIN g/dL 9.6*  --  8.5* 8.1* 7.9* 9.9* 9.1*  --    HEMATOCRIT % 31.1*  --  29.9* 25.7* 25.2* 32.0* 29.1*  --    PLATELETS Thousands/uL 214  --  160 175 131* 151 127*  --    POTASSIUM mmol/L 5.4* 4.8 6.1* 3.9 4.7 3.9 4.6 4.6   CHLORIDE mmol/L 114*  --  118* 113* 110* 112* 108 108   CO2 mmol/L 23  --  23 26 22 22 27 29   BUN mg/dL 44*  --  44* 49* 67* 60* 52* 50*   CREATININE mg/dL 1.32*  --  1.43* 1.57* 2.08* 2.31* 2.26* 2.49*   CALCIUM mg/dL 8.0*  --  7.9* 8.4 8.5 9.7 9.6 10.1   MAGNESIUM mg/dL  --   --  2.0  --  2.1 2.0 1.9 2.1   PHOSPHORUS mg/dL  --   --  3.2 2.0* 3.3 1.1* 2.5 3.2   ALBUMIN g/dL 3.5  --   --   --  3.1*  "3.7 3.7  --        Administrative Statements     Portions of the record may have been created with voice recognition software. Occasional wrong word or \"sound a like\" substitutions may have occurred due to the inherent limitations of voice recognition software. Read the chart carefully and recognize, using context, where substitutions have occurred.If you have any questions, please contact the dictating provider.  "

## 2024-10-26 NOTE — PROGRESS NOTES
Progress Note - Hospitalist   Name: Terrie Alicea 64 y.o. female I MRN: [id number]  Unit/Bed#: S -01 I Date of Admission: 10/18/2024   Date of Service: 10/26/2024 I Hospital Day: 8    Assessment & Plan  Sepsis (Formerly [facility])  10/21 Met SIRS criteria with fever, tachycardia  10/21 CXR - Faint inferior perihilar patchiness with subtle obscuration of the right cardiomediastinal silhouette could represent findings of aspiration in the appropriate clinical context.  Urinalysis - innumerable leukocytes and moderate bacteria on microscopy  Urine culture growing Morganella (susceptible to ceftriaxone)  Blood cultures negative to date  Elevated procalcitonin, bandemia  CT CAP - Multilobar pneumonia. No acute findings in the abdomen or pelvis. Prominent rectal vault stool without bowel obstruction.    Plan  Infectious disease consulted, appreciate recommendations  Continue IV Rocephin (Day 5).   Follow-up ID recommendations, plan to transition the patient to cefpodoxime on discharge through 10/28/2024.      Pneumonia  See plan for sepsis  UTI (urinary tract infection)  See plan for sepsis  SCAR (acute kidney injury) (Formerly [facility])  Recent Labs     10/24/24  0802 10/25/24  0520 10/26/24  0424   CREATININE 1.57* 1.43* 1.32*   EGFR 35 39 43     Estimated Creatinine Clearance: 27.9 mL/min (A) (by C-G formula based on SCr of 1.32 mg/dL (H)).    POA 2.83; (baseline 0.8-1)  US kidney and bladder - Bilateral small echogenic kidneys of medical renal disease.   Per Nephrology, likely secondary to vasoconstriction and hemodynamic changes in setting of hypercalcemia     Plan:  Nephrology consulted, appreciate recommendations  Free water flushes increased  Monitor I/Os  Avoid nephrotoxins  Avoid hypoperfusion  Creatinine improving.   Diabetes mellitus type 2, insulin dependent (Formerly [facility])  Lab Results   Component Value Date    HGBA1C 6.2 (H) 08/13/2024       Recent Labs     10/25/24  2125 10/25/24  2351 10/26/24  0554 10/26/24  1207   POCGLU 172* 180*  206* 260*       Blood Sugar Average: Last 72 hrs:  (P) [id number]  Hold home regimen while inpatient and resume on discharge regular insulin 4 units at 6 PM and midnight while tube feeds are running and sliding scale  10/22 Glucose levels elevated overnight, requiring insulin gtt    Plan  Resumed PTA regimen of regular insulin 4 units twice daily (6PM and midnight)  Continue sliding scale Algorithm 1 q6h  Monitor blood glucose. Goal -180 while admitted, adjusting insulin regimen as appropriate  Hypercalcemia  Recent Labs     10/24/24  0802 10/25/24  0520 10/26/24  0424   CALCIUM 8.4 7.9* 8.0*   ALB  --   --  3.5     Presented with abnormal lab work showing hypercalcemia of 14.2, repeat CMP showed calcium of 14.8  Per care nurse her mental status was abnormal day prior to admission, but it improved before arrival to ER  Corrected calcium: 14.6, iCal: 1.68, TSH: 0.8  Nephrology was reach out to in the ED recommended calcitonin  Etiology: Possibly calcium supplementation in the setting of immobility  Resolved s/p calcitonin  SPEP/UPEP - No monoclonal immunoglobulins on immunofixation    Plan:  PTHrp pending  Nephrology consulted, appreciate recommendations  Cerebral palsy (HCC)  At baseline is non verbal , totally dependent on care, resides in intermediate care facility  Continue PTA baclofen TID  Bipolar disorder (HCC)  Continue PTA Depakene and Abilify  Iron deficiency anemia  Recent Labs     10/24/24  0802 10/25/24  0520 10/26/24  0424   HGB 8.1* 8.5* 9.6*   Baseline: 9-11, currently hemoglobin is baseline  Ordered iron studies  Daily CBC  Continue PTA ferrous sulfate  Transfuse if hemoglobin less than 7  Hypothyroidism  Continue PTA levothyroxine  TSH and T4 within normal limits  Metabolic alkalosis  Currently Resolved.   Hypophosphatemia  Recent Labs     10/24/24  0802 10/25/24  0520   PHOS 2.0* 3.2     Replete as needed          Mobility:     Basic Mobility Inpatient Raw Score: 6  -[facility] Goal: 2:  Bed activities/Dependent transfer  JH-HLM Achieved: 2: Bed activities/Dependent transfer  HLM Goal achieved. Continue to encourage appropriate mobility.    Pharmacologic VTE Prophylaxis: Yes   Mechanical VTE Prophylaxis in Place: No   Patient Centered Rounds: I have performed bedside rounds with the Nursing staff today.   Current Length of Stay: 8 day(s)  Current Patient Status: Inpatient   Code Status: Level 1 - Full Code  Time Spent for Care:  35 minutes.  More than 50% of total time spent on counseling and coordination of care as described above.  Discussions with Specialists or Other Care Team Provider: Yes  Education and Discussions with Family / Patient: Yes, Updated family member. Caregiver Lisa.   Discharge Plan: 24-48 hrs.   Case Discussed with  regarding updating plan of care and disposition planning.   Certification Statement: The patient will continue to require additional inpatient hospital stay due to Dysphagia, UTI, Sepsis.     Subjective:   I have seen and Examined the patient at the bedside. No CP or Sob. No fevers or chills, No nausea or vomiting. Overnight events reviewed with the RN. No Other complains. Patient is afebrile for more than 24 hours.  Patient is nonverbal and most of the subjective and review of system was obtained from patient's nurse.    Review of System:   Denies any CP or SOB  Denies any Cough or Cold  Denies any Fevers or chills.   Denies any focal tingling numbness or weakness in any extremities.   Denies any abdominal pain, Nausea or vomiting.     Objective:   Temp (24hrs), Av °F (37.2 °C), Min:98.1 °F (36.7 °C), Max:99.5 °F (37.5 °C)    Temp:  [98.1 °F (36.7 °C)-99.5 °F (37.5 °C)] 99.1 °F (37.3 °C)  HR:  [74-91] 91  Resp:  [15-18] 18  BP: ()/(66-84) 131/84  SpO2:  [94 %-99 %] 94 %  Body mass index is 17.81 kg/m².     Input and Output Summary (last 24 hours):     Intake/Output Summary (Last 24 hours) at 10/26/2024 1416  Last data filed at 10/26/2024  1103  Gross per 24 hour   Intake 250 ml   Output 680 ml   Net -430 ml     I/O         10/24 0701  10/25 0700 10/25 0701  10/26 0700 10/26 0701  10/27 0700    NG/ 250     Total Intake(mL/kg) 670 (16.8) 250 (6.3)     Urine (mL/kg/hr) 420 (0.4)  680 (2.3)    Total Output 420  680    Net +250 +250 -680           Unmeasured Urine Occurrence  1 x 1 x    Unmeasured Stool Occurrence  1 x             Physical Exam:   General Exam: Patient is sleepy currently however awakens to stimuli.  Does not look to be any acute distress.  Nonverbal at baseline.  Eyes: FAREED  Neck: Supple.   CVS: S1, S2 [place], RRR.   R/S: Clear to auscultate anteriorly.  Decreased breath sounds secondary to poor inspiratory effort though.  No rhonchi or wheezes heard.  Abd: Soft, NT, ND, BS+ve PEG tube in place.  Extremities: Bilateral upper and lower extremity contractures noted.  No edema appreciated.  Skin: No acute Rash noted.   CNS: Functional quadriplegia noted.  Psych: Co-operative, Not agitated.     Additional Data:     Labs, Culture & Imaging Data Reviewed:    Results from last 7 days   Lab Units 10/26/24  0424   WBC Thousand/uL 10.43*   HEMOGLOBIN g/dL 9.6*   HEMATOCRIT % 31.1*   PLATELETS Thousands/uL 214     Results from last 7 days   Lab Units 10/26/24  0424   POTASSIUM mmol/L 5.4*   CHLORIDE mmol/L 114*   CO2 mmol/L 23   BUN mg/dL 44*   CREATININE mg/dL 1.32*   CALCIUM mg/dL 8.0*   ALK PHOS U/L 60   ALT U/L 14   AST U/L 37     Results from last 7 days   Lab Units 10/22/24  1209   INR  0.98     Lab Results   Component Value Date    HGBA1C 6.2 (H) 08/13/2024      CT chest abdomen pelvis wo contrast   Final Result by Kp Gayle MD (10/24 4498)      Multilobar pneumonia.      No acute findings in the abdomen or pelvis.      Prominent rectal vault stool without bowel obstruction.      The study was marked in EPIC for immediate notification.               Workstation performed: [id number]         NM lung perfusion imaging  "(particulate)   Final Result by Ezra Guevara MD (10/23 1536)      The probability for pulmonary embolus is low.      Workstation performed: [id number]         XR chest portable   Final Result by Freddie Manuel MD (10/23 1132)      Perihilar opacities may be due to atelectasis versus pneumonia.            Workstation performed: [id number]         XR chest portable   Final Result by Flynn Gonzalez MD (10/22 1047)      Faint inferior perihilar patchiness with subtle obscuration of the right cardiomediastinal silhouette could represent findings of aspiration in the appropriate clinical context.            Resident: Ezra Andrews I, the attending radiologist, have reviewed the images and agree with the final report above.      Workstation performed: [id number]         US kidney and bladder   Final Result by Austin Nelson MD (10/19 1250)   Study technically limited.      Bilateral small echogenic kidneys of medical renal disease.   Possible debris in the bladder. Correlate with urinalysis.            Workstation performed: [id number]             Cultures:   Blood Culture:   Lab Results   Component Value Date    BLOODCX No Growth After 4 Days. 10/22/2024    BLOODCX No Growth After 4 Days. 10/22/2024     Urine Culture:   Lab Results   Component Value Date    URINECX >100,000 cfu/ml Morganella morganii (A) 10/22/2024    URINECX >100,000 cfu/ml Escherichia coli (A) 08/20/2024     Sputum Culture: No components found for: \"SPUTUMCX\"  Wound Culture: No results found for: \"WOUNDCULT\"    Last 24 Hours Medication List:   Current Facility-Administered Medications   Medication Dose Route Frequency Provider Last Rate    acetaminophen  15 mg/kg Intravenous Q6H PRN Aden Hastings  mg (10/25/24 9336)    ARIPiprazole  2 mg Per G Tube HS Leonidas Sharma MD      vitamin C  1,000 mg Per G Tube Daily Leonidas Sharma MD      baclofen  10 mg Per G Tube TID Keiry Ricardo MD      carbidopa-levodopa  2 tablet Oral BID Leonidas " MD Zachary      cefTRIAXone  1,000 mg Intravenous Q24H Keiry Ricardo MD 1,000 mg (10/25/24 1410)    citalopram  20 mg Per G Tube Daily Leonidas Sharma MD      Ferrous Sulfate  300 mg Per G Tube Every Other Day Leonidas Sharma MD      heparin (porcine)  5,000 Units Subcutaneous Q8H [facility] Keiry Ricardo MD      influenza vaccine  0.5 mL Intramuscular Once Nicolas Lopez DO      insulin lispro  1-5 Units Subcutaneous Q6H [facility] Leonidas Sharma MD      insulin NPH  15 Units Subcutaneous Q24H [facility] Jennifer Hernandez MD      Ketotifen Fumarate  1 drop Both Eyes BID PRN Leonidas Sharma MD      levothyroxine  25 mcg Per G Tube Early Morning Leonidas Sharma MD      LORazepam  0.5 mg Per G Tube HS Keiry Ricardo MD      midodrine  5 mg Per G Tube TID PRN Leonidas Sharma MD      oxybutynin  5 mg Per G Tube TID Leonidas Sharma MD      pantoprazole  20 mg Intravenous Q24H [facility] Leonidas Sharma MD      polyethylene glycol  17 g Per G Tube Daily Keiry Ricardo MD      pravastatin  40 mg Per G Tube Daily With Dinner Leonidas Sharma MD      senna-docusate sodium  1 tablet Per G Tube HS Yolanda Kay,       sodium chloride  1,000 mL Intravenous Once Leonidas Sharma MD      traZODone  100 mg Per G Tube HS PRN Jennifer Hernandez MD      valproic acid  125 mg Per G Tube BID Leonidas Sharma MD           Patient is at moderate risk for morbidity and mortality due to above mentioned illness and comorbidities.

## 2024-10-26 NOTE — ASSESSMENT & PLAN NOTE
Recent Labs     10/24/24  0802 10/25/24  0520 10/26/24  0424   CALCIUM 8.4 7.9* 8.0*   ALB  --   --  3.5     Presented with abnormal lab work showing hypercalcemia of 14.2, repeat CMP showed calcium of 14.8  Per care nurse her mental status was abnormal day prior to admission, but it improved before arrival to ER  Corrected calcium: 14.6, iCal: 1.68, TSH: 0.8  Nephrology was reach out to in the ED recommended calcitonin  Etiology: Possibly calcium supplementation in the setting of immobility  Resolved s/p calcitonin  SPEP/UPEP - No monoclonal immunoglobulins on immunofixation    Plan:  PTHrp pending  Nephrology consulted, appreciate recommendations

## 2024-10-27 ENCOUNTER — APPOINTMENT (INPATIENT)
Dept: CT IMAGING | Facility: HOSPITAL | Age: 64
DRG: 640 | End: 2024-10-27
Payer: MEDICARE

## 2024-10-27 LAB
ANION GAP SERPL CALCULATED.3IONS-SCNC: 8 MMOL/L (ref 4–13)
ANION GAP SERPL CALCULATED.3IONS-SCNC: 9 MMOL/L (ref 4–13)
BACTERIA BLD CULT: NORMAL
BACTERIA BLD CULT: NORMAL
BUN SERPL-MCNC: 35 MG/DL (ref 5–25)
BUN SERPL-MCNC: 44 MG/DL (ref 5–25)
CALCIUM SERPL-MCNC: 8.4 MG/DL (ref 8.4–10.2)
CALCIUM SERPL-MCNC: 9.2 MG/DL (ref 8.4–10.2)
CHLORIDE SERPL-SCNC: 110 MMOL/L (ref 96–108)
CHLORIDE SERPL-SCNC: 114 MMOL/L (ref 96–108)
CO2 SERPL-SCNC: 23 MMOL/L (ref 21–32)
CO2 SERPL-SCNC: 24 MMOL/L (ref 21–32)
CREAT SERPL-MCNC: 1.27 MG/DL (ref 0.6–1.3)
CREAT SERPL-MCNC: 1.29 MG/DL (ref 0.6–1.3)
ERYTHROCYTE [DISTWIDTH] IN BLOOD BY AUTOMATED COUNT: 13.3 % (ref 11.6–15.1)
GFR SERPL CREATININE-BSD FRML MDRD: 44 ML/MIN/1.73SQ M
GFR SERPL CREATININE-BSD FRML MDRD: 45 ML/MIN/1.73SQ M
GLUCOSE SERPL-MCNC: 263 MG/DL (ref 65–140)
GLUCOSE SERPL-MCNC: 271 MG/DL (ref 65–140)
GLUCOSE SERPL-MCNC: 293 MG/DL (ref 65–140)
GLUCOSE SERPL-MCNC: 59 MG/DL (ref 65–140)
GLUCOSE SERPL-MCNC: 61 MG/DL (ref 65–140)
HCT VFR BLD AUTO: 31.2 % (ref 34.8–46.1)
HGB BLD-MCNC: 9.7 G/DL (ref 11.5–15.4)
MAGNESIUM SERPL-MCNC: 2.1 MG/DL (ref 1.9–2.7)
MCH RBC QN AUTO: 32.9 PG (ref 26.8–34.3)
MCHC RBC AUTO-ENTMCNC: 31.1 G/DL (ref 31.4–37.4)
MCV RBC AUTO: 106 FL (ref 82–98)
PHOSPHATE SERPL-MCNC: 2.6 MG/DL (ref 2.3–4.1)
PLATELET # BLD AUTO: 223 THOUSANDS/UL (ref 149–390)
PMV BLD AUTO: 11.7 FL (ref 8.9–12.7)
POTASSIUM SERPL-SCNC: 4.3 MMOL/L (ref 3.5–5.3)
POTASSIUM SERPL-SCNC: 5.5 MMOL/L (ref 3.5–5.3)
PTH RELATED PROT SERPL-SCNC: <2 PMOL/L
RBC # BLD AUTO: 2.95 MILLION/UL (ref 3.81–5.12)
SODIUM SERPL-SCNC: 141 MMOL/L (ref 135–147)
SODIUM SERPL-SCNC: 147 MMOL/L (ref 135–147)
WBC # BLD AUTO: 11.66 THOUSAND/UL (ref 4.31–10.16)

## 2024-10-27 PROCEDURE — 99232 SBSQ HOSP IP/OBS MODERATE 35: CPT | Performed by: INTERNAL MEDICINE

## 2024-10-27 PROCEDURE — 80048 BASIC METABOLIC PNL TOTAL CA: CPT | Performed by: INTERNAL MEDICINE

## 2024-10-27 PROCEDURE — 82948 REAGENT STRIP/BLOOD GLUCOSE: CPT

## 2024-10-27 PROCEDURE — 99233 SBSQ HOSP IP/OBS HIGH 50: CPT | Performed by: INTERNAL MEDICINE

## 2024-10-27 PROCEDURE — 70480 CT ORBIT/EAR/FOSSA W/O DYE: CPT

## 2024-10-27 PROCEDURE — 84100 ASSAY OF PHOSPHORUS: CPT | Performed by: INTERNAL MEDICINE

## 2024-10-27 PROCEDURE — 85027 COMPLETE CBC AUTOMATED: CPT | Performed by: INTERNAL MEDICINE

## 2024-10-27 PROCEDURE — 83735 ASSAY OF MAGNESIUM: CPT | Performed by: INTERNAL MEDICINE

## 2024-10-27 RX ORDER — ACETAMINOPHEN 325 MG/1
650 TABLET ORAL EVERY 6 HOURS PRN
Status: DISCONTINUED | OUTPATIENT
Start: 2024-10-27 | End: 2024-11-04 | Stop reason: HOSPADM

## 2024-10-27 RX ORDER — SODIUM CHLORIDE 9 MG/ML
50 INJECTION, SOLUTION INTRAVENOUS CONTINUOUS
Status: DISPENSED | OUTPATIENT
Start: 2024-10-27 | End: 2024-10-27

## 2024-10-27 RX ADMIN — INSULIN LISPRO 2 UNITS: 100 INJECTION, SOLUTION INTRAVENOUS; SUBCUTANEOUS at 05:41

## 2024-10-27 RX ADMIN — FERROUS SULFATE 300 MG: 300 SOLUTION ORAL at 09:53

## 2024-10-27 RX ADMIN — BACLOFEN 10 MG: 10 TABLET ORAL at 17:01

## 2024-10-27 RX ADMIN — SENNOSIDES AND DOCUSATE SODIUM 1 TABLET: 50; 8.6 TABLET ORAL at 21:42

## 2024-10-27 RX ADMIN — ARIPIPRAZOLE 2 MG: 2 TABLET ORAL at 22:05

## 2024-10-27 RX ADMIN — INSULIN LISPRO 2 UNITS: 100 INJECTION, SOLUTION INTRAVENOUS; SUBCUTANEOUS at 13:59

## 2024-10-27 RX ADMIN — OXYBUTYNIN CHLORIDE 5 MG: 5 TABLET ORAL at 17:01

## 2024-10-27 RX ADMIN — INSULIN HUMAN 4 UNITS: 100 INJECTION, SOLUTION PARENTERAL at 17:01

## 2024-10-27 RX ADMIN — OXYBUTYNIN CHLORIDE 5 MG: 5 TABLET ORAL at 21:42

## 2024-10-27 RX ADMIN — CARBIDOPA AND LEVODOPA 2 TABLET: 25; 100 TABLET, EXTENDED RELEASE ORAL at 17:01

## 2024-10-27 RX ADMIN — HEPARIN SODIUM 5000 UNITS: 5000 INJECTION INTRAVENOUS; SUBCUTANEOUS at 05:41

## 2024-10-27 RX ADMIN — PRAVASTATIN SODIUM 40 MG: 40 TABLET ORAL at 17:01

## 2024-10-27 RX ADMIN — VALPROIC ACID 125 MG: 250 SOLUTION ORAL at 09:53

## 2024-10-27 RX ADMIN — CITALOPRAM HYDROBROMIDE 20 MG: 20 TABLET, FILM COATED ORAL at 09:54

## 2024-10-27 RX ADMIN — BACLOFEN 10 MG: 10 TABLET ORAL at 21:42

## 2024-10-27 RX ADMIN — VALPROIC ACID 125 MG: 250 SOLUTION ORAL at 21:46

## 2024-10-27 RX ADMIN — LEVOTHYROXINE SODIUM 25 MCG: 25 TABLET ORAL at 05:41

## 2024-10-27 RX ADMIN — INSULIN HUMAN 15 UNITS: 100 INJECTION, SUSPENSION SUBCUTANEOUS at 10:03

## 2024-10-27 RX ADMIN — OXYCODONE HYDROCHLORIDE AND ACETAMINOPHEN 1000 MG: 500 TABLET ORAL at 09:53

## 2024-10-27 RX ADMIN — PANTOPRAZOLE SODIUM 20 MG: 40 INJECTION, POWDER, FOR SOLUTION INTRAVENOUS at 09:54

## 2024-10-27 RX ADMIN — BACLOFEN 10 MG: 10 TABLET ORAL at 09:53

## 2024-10-27 RX ADMIN — HEPARIN SODIUM 5000 UNITS: 5000 INJECTION INTRAVENOUS; SUBCUTANEOUS at 13:59

## 2024-10-27 RX ADMIN — HEPARIN SODIUM 5000 UNITS: 5000 INJECTION INTRAVENOUS; SUBCUTANEOUS at 22:01

## 2024-10-27 RX ADMIN — POLYETHYLENE GLYCOL 3350 17 G: 17 POWDER, FOR SOLUTION ORAL at 09:54

## 2024-10-27 RX ADMIN — Medication 600 MG: at 07:00

## 2024-10-27 RX ADMIN — LORAZEPAM 0.5 MG: 0.5 TABLET ORAL at 21:41

## 2024-10-27 RX ADMIN — CARBIDOPA AND LEVODOPA 2 TABLET: 25; 100 TABLET, EXTENDED RELEASE ORAL at 09:53

## 2024-10-27 RX ADMIN — OXYBUTYNIN CHLORIDE 5 MG: 5 TABLET ORAL at 09:53

## 2024-10-27 RX ADMIN — SODIUM CHLORIDE 50 ML/HR: 0.9 INJECTION, SOLUTION INTRAVENOUS at 13:59

## 2024-10-27 RX ADMIN — CEFTRIAXONE 1000 MG: 2 INJECTION, POWDER, FOR SOLUTION INTRAMUSCULAR; INTRAVENOUS at 14:04

## 2024-10-27 NOTE — PROGRESS NOTES
Progress Note - Hospitalist   Name: Terrie Alicea 64 y.o. female I MRN: [id number]  Unit/Bed#: S -01 I Date of Admission: 10/18/2024   Date of Service: 10/27/2024 I Hospital Day: 9    Assessment & Plan  Sepsis ([facility])  10/21 Met SIRS criteria with fever, tachycardia  10/21 CXR - Faint inferior perihilar patchiness with subtle obscuration of the right cardiomediastinal silhouette could represent findings of aspiration in the appropriate clinical context.  Urinalysis - innumerable leukocytes and moderate bacteria on microscopy  Urine culture growing Morganella (susceptible to ceftriaxone)  Blood cultures negative to date  Elevated procalcitonin, bandemia  CT CAP - Multilobar pneumonia. No acute findings in the abdomen or pelvis. Prominent rectal vault stool without bowel obstruction.  Recurrent fevers. Mental status unchanged    Plan  Infectious disease consulted, appreciate recommendations  Continue IV Rocephin (Day 6/7)   Obtain CT temporal bones to rule out mastoiditis    Pneumonia  See plan for sepsis  UTI (urinary tract infection)  See plan for sepsis  SCAR (acute kidney injury) ([facility])  Recent Labs     10/25/24  0520 10/26/24  0424 10/27/24  0424   CREATININE 1.43* 1.32* 1.29   EGFR 39 43 44     Estimated Creatinine Clearance: 28.6 mL/min (by C-G formula based on SCr of 1.29 mg/dL).    POA 2.83; (baseline 0.8-1)  US kidney and bladder - Bilateral small echogenic kidneys of medical renal disease.   Per Nephrology, likely secondary to vasoconstriction and hemodynamic changes in setting of hypercalcemia     Plan:  Nephrology consulted, appreciate recommendations  Free water flushes increased  Monitor I/Os  Avoid nephrotoxins  Avoid hypoperfusion  Creatinine improving.   Diabetes mellitus type 2, insulin dependent ([facility])  Lab Results   Component Value Date    HGBA1C 6.2 (H) 08/13/2024       Recent Labs     10/26/24  1207 10/26/24  1738 10/26/24  2330 10/27/24  0509   POCGLU 260* 155* 246* 293*       Blood Sugar  Average: Last 72 hrs:  (P) [id number]  Hold home regimen while inpatient and resume on discharge regular insulin 4 units at 6 PM and midnight while tube feeds are running and sliding scale  10/22 Glucose levels elevated overnight, requiring insulin gtt    Plan  Resumed PTA regimen of regular insulin 4 units twice daily (6PM and midnight)  Continue sliding scale Algorithm 1 q6h  Monitor blood glucose. Goal -180 while admitted, adjusting insulin regimen as appropriate  Hypercalcemia  Recent Labs     10/25/24  0520 10/26/24  0424 10/27/24  0424   CALCIUM 7.9* 8.0* 8.4   ALB  --  3.5  --      Presented with abnormal lab work showing hypercalcemia of 14.2, repeat CMP showed calcium of 14.8  Per care nurse her mental status was abnormal day prior to admission, but it improved before arrival to ER  Corrected calcium: 14.6, iCal: 1.68, TSH: 0.8  Nephrology was reach out to in the ED recommended calcitonin  Etiology: Possibly calcium supplementation in the setting of immobility  Resolved s/p calcitonin  SPEP/UPEP - No monoclonal immunoglobulins on immunofixation    Plan:  PTHrp pending  Nephrology consulted, appreciate recommendations  Cerebral palsy (HCC)  At baseline is non verbal , totally dependent on care, resides in intermediate care facility  Continue PTA baclofen TID  Bipolar disorder (HCC)  Continue PTA Depakene and Abilify  Iron deficiency anemia  Recent Labs     10/25/24  0520 10/26/24  0424 10/27/24  0424   HGB 8.5* 9.6* 9.7*   Baseline: 9-11, currently hemoglobin is baseline  Ordered iron studies  Daily CBC  Continue PTA ferrous sulfate  Transfuse if hemoglobin less than 7  Hypothyroidism  Continue PTA levothyroxine  TSH and T4 within normal limits  Metabolic alkalosis  Currently Resolved.   Hypophosphatemia  Recent Labs     10/25/24  0520 10/27/24  0424   PHOS 3.2 2.6     Replete as needed      VTE Pharmacologic Prophylaxis: VTE Score: 4 Moderate Risk (Score 3-4) - Pharmacological DVT  Prophylaxis Ordered: heparin.    Mobility:   Basic Mobility Inpatient Raw Score: 6  JH-HLM Goal: 2: Bed activities/Dependent transfer  JH-HLM Achieved: 2: Bed activities/Dependent transfer  JH-HLM Goal achieved. Continue to encourage appropriate mobility.    Patient Centered Rounds: I performed bedside rounds with nursing staff today.   Discussions with Specialists or Other Care Team Provider: Attending    Education and Discussions with Family / Patient:  Will contact caregiver Lisa later in the day.     Current Length of Stay: 9 day(s)  Current Patient Status: Inpatient   Certification Statement: The patient will continue to require additional inpatient hospital stay due to recurrent fevers of unknown origin  Discharge Plan: [facility].    Code Status: Level 1 - Full Code    Subjective   No acute overnight events. Patient was seen and examined at bedside. She is nonverbal at baseline. Febrile to 102.4 this morning. Per nursing, she has not been grimacing to pain. No concerning wounds. Large bowel movement yesterday.     Objective :  Temp:  [99.1 °F (37.3 °C)-102.4 °F (39.1 °C)] 100.1 °F (37.8 °C)  HR:  [] 103  BP: (109-140)/(76-87) 140/87  Resp:  [14-18] 18  SpO2:  [95 %-98 %] 95 %  O2 Device: None (Room air)    Body mass index is 17.81 kg/m².     Input and Output Summary (last 24 hours):     Intake/Output Summary (Last 24 hours) at 10/27/2024 1023  Last data filed at 10/27/2024 0757  Gross per 24 hour   Intake 500 ml   Output 680 ml   Net -180 ml       Physical Exam  Vitals and nursing note reviewed.   Constitutional:       General: She is not in acute distress.     Appearance: She is well-developed. She is ill-appearing.      Comments: Nonverbal at baseline   HENT:      Head: Normocephalic and atraumatic.      Mouth/Throat:      Comments: Dry lips  Eyes:      Conjunctiva/sclera: Conjunctivae normal.   Cardiovascular:      Rate and Rhythm: Normal rate and regular rhythm.       Heart sounds: No murmur heard.  Pulmonary:      Effort: Pulmonary effort is normal. No respiratory distress.      Breath sounds: Normal breath sounds.   Abdominal:      Palpations: Abdomen is soft.      Tenderness: There is no abdominal tenderness.   Musculoskeletal:         General: No swelling.      Cervical back: Neck supple.   Skin:     General: Skin is warm and dry.   Neurological:      Mental Status: She is alert. Mental status is at baseline.   Psychiatric:         Mood and Affect: Mood normal.           Lines/Drains:              Lab Results: I have reviewed the following results:   Results from last 7 days   Lab Units 10/27/24  0424 10/26/24  0424 10/25/24  0520   WBC Thousand/uL 11.66*   < > 9.44   HEMOGLOBIN g/dL 9.7*   < > 8.5*   HEMATOCRIT % 31.2*   < > 29.9*   PLATELETS Thousands/uL 223   < > 160   BANDS PCT %  --   --  1   LYMPHO PCT %  --   --  25   MONO PCT %  --   --  2*   EOS PCT %  --   --  2    < > = values in this interval not displayed.     Results from last 7 days   Lab Units 10/27/24  0424 10/26/24  0424   SODIUM mmol/L 141 143   POTASSIUM mmol/L 5.5* 5.4*   CHLORIDE mmol/L 110* 114*   CO2 mmol/L 23 23   BUN mg/dL 44* 44*   CREATININE mg/dL 1.29 1.32*   ANION GAP mmol/L 8 6   CALCIUM mg/dL 8.4 8.0*   ALBUMIN g/dL  --  3.5   TOTAL BILIRUBIN mg/dL  --  0.19*   ALK PHOS U/L  --  60   ALT U/L  --  14   AST U/L  --  37   GLUCOSE RANDOM mg/dL 263* 165*     Results from last 7 days   Lab Units 10/22/24  1209   INR  0.98     Results from last 7 days   Lab Units 10/27/24  0509 10/26/24  2330 10/26/24  1738 10/26/24  1207 10/26/24  0554 10/25/24  2351 10/25/24  2125 10/25/24  1708 10/25/24  1208 10/25/24  1032 10/25/24  0823 10/25/24  0545   POC GLUCOSE mg/dl 293* 246* 155* 260* 206* 180* 172* 103 120 160* 74 103         Results from last 7 days   Lab Units 10/24/24  0802 10/22/24  0506   PROCALCITONIN ng/ml 3.26* 4.79*       Recent Cultures (last 7 days):   Results from last 7 days   Lab Units  10/22/24  1046 10/22/24  0902   BLOOD CULTURE   --  No Growth After 4 Days.  No Growth After 4 Days.   URINE CULTURE  >100,000 cfu/ml Morganella morganii*  --              Last 24 Hours Medication List:     Current Facility-Administered Medications:     acetaminophen (TYLENOL) tablet 650 mg, Q6H PRN    ARIPiprazole (ABILIFY) tablet 2 mg, HS    ascorbic acid (VITAMIN C) tablet 1,000 mg, Daily    baclofen tablet 10 mg, TID    carbidopa-levodopa (SINEMET CR)  mg per ER tablet 2 tablet, BID    ceftriaxone (ROCEPHIN) 1 g/50 mL in dextrose IVPB, Q24H, Last Rate: 1,000 mg (10/26/24 1521)    citalopram (CeleXA) tablet 20 mg, Daily    Ferrous Sulfate oral syrup 300 mg, Every Other Day    heparin (porcine) subcutaneous injection 5,000 Units, Q8H [facility]    influenza vaccine, recombinant (PF) (Flublok) IM injection 0.5 mL, Once    insulin lispro (HumALOG/ADMELOG) 100 units/mL subcutaneous injection 1-5 Units, Q6H ROSEMARY **AND** Fingerstick Glucose (POCT), Q6H    insulin regular (HumuLIN R,NovoLIN R) injection 4 Units, BID    Ketotifen Fumarate (ZADITOR) 0.035 % ophthalmic solution 1 drop, BID PRN    levothyroxine tablet 25 mcg, Early Morning    LORazepam (ATIVAN) tablet 0.5 mg, HS    midodrine (PROAMATINE) tablet 5 mg, TID PRN    oxybutynin (DITROPAN) tablet 5 mg, TID    pantoprazole (PROTONIX) injection 20 mg, Q24H ROSEMARY    polyethylene glycol (MIRALAX) packet 17 g, Daily    pravastatin (PRAVACHOL) tablet 40 mg, Daily With Dinner    senna-docusate sodium (SENOKOT S) 8.6-50 mg per tablet 1 tablet, HS    sodium chloride 0.9 % bolus 1,000 mL, Once    traZODone (DESYREL) tablet 100 mg, HS PRN    valproic acid (DEPAKENE) oral soln 125 mg, BID    Administrative Statements   Today, Patient Was Seen By: Keiry Ricardo MD      **Please Note: This note may have been constructed using a voice recognition system.**

## 2024-10-27 NOTE — ASSESSMENT & PLAN NOTE
Recent Labs     10/25/24  0520 10/26/24  0424 10/27/24  0424   HGB 8.5* 9.6* 9.7*   Baseline: 9-11, currently hemoglobin is baseline  Ordered iron studies  Daily CBC  Continue PTA ferrous sulfate  Transfuse if hemoglobin less than 7

## 2024-10-27 NOTE — ASSESSMENT & PLAN NOTE
Recent Labs     10/25/24  0520 10/26/24  0424 10/27/24  0424   CREATININE 1.43* 1.32* 1.29   EGFR 39 43 44     Estimated Creatinine Clearance: 28.6 mL/min (by C-G formula based on SCr of 1.29 mg/dL).    POA 2.83; (baseline 0.8-1)  US kidney and bladder - Bilateral small echogenic kidneys of medical renal disease.   Per Nephrology, likely secondary to vasoconstriction and hemodynamic changes in setting of hypercalcemia     Plan:  Nephrology consulted, appreciate recommendations  Free water flushes increased  Monitor I/Os  Avoid nephrotoxins  Avoid hypoperfusion  Creatinine improving.

## 2024-10-27 NOTE — PROGRESS NOTES
Progress Note - Nephrology   Name: Terrie Alicea 64 y.o. female I MRN: [id number]  Unit/Bed#: S -01 I Date of Admission: 10/18/2024   Date of Service: 10/27/2024 I Hospital Day: 9    Assessment & Plan  Hypercalcemia  -Admission calcium 14.2  - Etiology multifactorial in setting of volume depletion, immobilization, calcium supplements  - Started on intravenous fluids and calcitonin initially  - PTH-appropriately suppressed  - Vitamin D 25 level-91  - PTH RP-  - SPEP-immunofixation no monoclonal band  - UPEP-  - Kappa/lambda ratio 1.2    Overall,  calcium has improved and subsequently was decreasing to hypocalcemia.  But overall is improving 10/27-8.4.  Renal function is slowly improving with creatinine 1.2.  Patient is having higher fevers.  Likely insensible losses.  Borderline elevated potassium 5.5.  Though hyperglycemia is likely contributing to this also.  We will give normal saline.  Continue free water flush for now.    Plan  - Follow-up PTH RP  - Trend calcium for now  - Continue free water flush  - give low dose normal saline today  - Please change tube feeds to low potassium feeds  - Check lab work this evening  - If potassium rises will need Lokelma  - First recommend blood sugar control and repeat BMP this evening  - Fever workup in progress by primary team    SCAR (acute kidney injury) (HCC)  -Likely secondary to hypercalcemia/volume depletion leading to prerenal/ATN etiology  - Baseline creatinine 0.8 mg/dL  - Admission creatinine 2.8 mg/dL  - Urinalysis without hematuria  - Renal imaging-no hydronephrosis  - Initially started on intravenous fluids and renal function is improving  Metabolic alkalosis  -Initially with metabolic alkalosis.  Bicarbonate improving  Cerebral palsy (HCC)  -Per primary team  Diabetes mellitus type 2, insulin dependent (HCC)  -Per primary team  Iron deficiency anemia  -Per primary team  - Received IV Venofer 10/21  Hypophosphatemia  -Received intermittent phosphorus  repletion due to hypophosphatemia  Sepsis (HCC)  -Management per primary team  - ID team on board  - On ceftriaxone  UTI (urinary tract infection)  -Urine culture with Morganella  - On ceftriaxone  Pneumonia  -Chest x-ray possible opacity  - VQ scan-low probability of PE  - Concern for ongoing fevers.  Workup in progress by primary team  - CT head pending    Hyperkalemia-potassium 5.5-elevated.  See discussion above    I have reviewed the nephrology recommendations including tube feed change, fluids, repeat BMP this evening, with primary team resident, and we are in agreement with renal plan including the information outlined above.     Subjective   Brief History of Admission - 64-year-old female with a past medical history of cerebral palsy, diabetes, Parkinson, hyperlipidemia who initially presents with hypercalcemia and SCAR. Nephrology is consulted for SCAR and hypercalcemia. Course has been complicated by sepsis and fevers. Hypercalcemia has resolved     Patient is nonverbal.  Blood pressures 10 9-1 40 systolic.  Febrile 102.4.  On room air.  Urine output not strictly recorded.    Objective :  Temp:  [99.1 °F (37.3 °C)-102.4 °F (39.1 °C)] 100.1 °F (37.8 °C)  HR:  [] 103  BP: (109-140)/(76-87) 140/87  Resp:  [14-18] 18  SpO2:  [95 %-98 %] 95 %  O2 Device: None (Room air)    Current Weight: Weight - Scale: 40 kg (88 lb 2.9 oz)  First Weight: Weight - Scale: 40 kg (88 lb 2.9 oz)  I/O         10/25 0701  10/26 0700 10/26 0701  10/27 0700 10/27 0701  10/28 0700    P.O.   0    NG/ 500     Total Intake(mL/kg) 250 (6.3) 500 (12.5) 0 (0)    Urine (mL/kg/hr)  680 (0.7)     Total Output  680     Net +250 -180 0           Unmeasured Urine Occurrence 1 x 4 x 2 x    Unmeasured Stool Occurrence 1 x            Physical Exam  General: NAD but chronically ill-appearing  Skin: no rash  Eyes: anicteric sclera  Neck: supple  Chest: Clear to auscultation, but chronic contractures  CVS: s1s2, no murmur, no gallop, no  rub  Abdomen: soft, nontender, nl sounds  Extremities: no edema LE b/l  : no boateng  Neuro: Awake but cannot assess further  Psych:  awake but cannot assess further    Medications:    Current Facility-Administered Medications:     acetaminophen (TYLENOL) tablet 650 mg, 650 mg, Per G Tube, Q6H PRN, Jennifer Hernandez MD    ARIPiprazole (ABILIFY) tablet 2 mg, 2 mg, Per G Tube, HS, Leonidas Sharma MD, 2 mg at 10/26/24 2153    ascorbic acid (VITAMIN C) tablet 1,000 mg, 1,000 mg, Per G Tube, Daily, Leonidas Sharma MD, 1,000 mg at 10/27/24 0953    baclofen tablet 10 mg, 10 mg, Per G Tube, TID, Keiry Ricardo MD, 10 mg at 10/27/24 0953    carbidopa-levodopa (SINEMET CR)  mg per ER tablet 2 tablet, 2 tablet, Oral, BID, Leonidas Sharma MD, 2 tablet at 10/27/24 0953    ceftriaxone (ROCEPHIN) 1 g/50 mL in dextrose IVPB, 1,000 mg, Intravenous, Q24H, Keiry Ricardo MD, Last Rate: 100 mL/hr at 10/26/24 1521, 1,000 mg at 10/26/24 1521    citalopram (CeleXA) tablet 20 mg, 20 mg, Per G Tube, Daily, Leonidas Sharma MD, 20 mg at 10/27/24 0954    Ferrous Sulfate oral syrup 300 mg, 300 mg, Per G Tube, Every Other Day, Leonidas Sharma MD, 300 mg at 10/27/24 0953    heparin (porcine) subcutaneous injection 5,000 Units, 5,000 Units, Subcutaneous, Q8H ROSEMARY, Keiry Ricardo MD, 5,000 Units at 10/27/24 0541    influenza vaccine, recombinant (PF) (Flublok) IM injection 0.5 mL, 0.5 mL, Intramuscular, Once, Nicolas Lopez DO    insulin lispro (HumALOG/ADMELOG) 100 units/mL subcutaneous injection 1-5 Units, 1-5 Units, Subcutaneous, Q6H ROSEMARY, 2 Units at 10/27/24 0541 **AND** Fingerstick Glucose (POCT), , , Q6H, Leonidas Sharma MD    insulin regular (HumuLIN R,NovoLIN R) injection 4 Units, 4 Units, Subcutaneous, BID, Jennifer Hernandez MD    Ketotifen Fumarate (ZADITOR) 0.035 % ophthalmic solution 1 drop, 1 drop, Both Eyes, BID PRN, Leonidas Sharma MD    levothyroxine tablet 25 mcg, 25 mcg, Per G Tube, Early Morning, Leonidas Sharma MD, 25 mcg at 10/27/24 0597     LORazepam (ATIVAN) tablet 0.5 mg, 0.5 mg, Per G Tube, HS, Keiry Ricardo MD, 0.5 mg at 10/26/24 2152    midodrine (PROAMATINE) tablet 5 mg, 5 mg, Per G Tube, TID PRN, Leonidas Sharma MD    oxybutynin (DITROPAN) tablet 5 mg, 5 mg, Per G Tube, TID, Leonidas Sharma MD, 5 mg at 10/27/24 0953    pantoprazole (PROTONIX) injection 20 mg, 20 mg, Intravenous, Q24H ROSEMARY, Leonidas Sharma MD, 20 mg at 10/27/24 0954    polyethylene glycol (MIRALAX) packet 17 g, 17 g, Per G Tube, Daily, Keiry Ricardo MD, 17 g at 10/27/24 0954    pravastatin (PRAVACHOL) tablet 40 mg, 40 mg, Per G Tube, Daily With Dinner, Leonidas Sharma MD, 40 mg at 10/26/24 1534    senna-docusate sodium (SENOKOT S) 8.6-50 mg per tablet 1 tablet, 1 tablet, Per G Tube, HS, Yolanda Kay, DO, 1 tablet at 10/26/24 2152    sodium chloride 0.9 % bolus 1,000 mL, 1,000 mL, Intravenous, Once, Leonidas Sharma MD    traZODone (DESYREL) tablet 100 mg, 100 mg, Per G Tube, HS PRN, Jennifer Hernandez MD    valproic acid (DEPAKENE) oral soln 125 mg, 125 mg, Per G Tube, BID, Leonidas Sharma MD, 125 mg at 10/27/24 0953      Lab Results: I have reviewed the following results:  Results from last 7 days   Lab Units 10/27/24  0424 10/26/24  0424 10/25/24  0817 10/25/24  0520 10/24/24  0802 10/23/24  0520 10/22/24  0506 10/21/24  0525   WBC Thousand/uL 11.66* 10.43*  --  9.44 13.01* 11.50* 6.26 5.82   HEMOGLOBIN g/dL 9.7* 9.6*  --  8.5* 8.1* 7.9* 9.9* 9.1*   HEMATOCRIT % 31.2* 31.1*  --  29.9* 25.7* 25.2* 32.0* 29.1*   PLATELETS Thousands/uL 223 214  --  160 175 131* 151 127*   POTASSIUM mmol/L 5.5* 5.4* 4.8 6.1* 3.9 4.7 3.9 4.6   CHLORIDE mmol/L 110* 114*  --  118* 113* 110* 112* 108   CO2 mmol/L 23 23  --  23 26 22 22 27   BUN mg/dL 44* 44*  --  44* 49* 67* 60* 52*   CREATININE mg/dL 1.29 1.32*  --  1.43* 1.57* 2.08* 2.31* 2.26*   CALCIUM mg/dL 8.4 8.0*  --  7.9* 8.4 8.5 9.7 9.6   MAGNESIUM mg/dL 2.1  --   --  2.0  --  2.1 2.0 1.9   PHOSPHORUS mg/dL 2.6  --   --  3.2 2.0* 3.3 1.1* 2.5   ALBUMIN  "g/dL  --  3.5  --   --   --  3.1* 3.7 3.7       Administrative Statements     Portions of the record may have been created with voice recognition software. Occasional wrong word or \"sound a like\" substitutions may have occurred due to the inherent limitations of voice recognition software. Read the chart carefully and recognize, using context, where substitutions have occurred.If you have any questions, please contact the dictating provider.  "

## 2024-10-27 NOTE — ASSESSMENT & PLAN NOTE
-Chest x-ray possible opacity  - VQ scan-low probability of PE  - Concern for ongoing fevers.  Workup in progress by primary team  - CT head pending    Hyperkalemia-potassium 5.5-elevated.  See discussion above

## 2024-10-27 NOTE — ASSESSMENT & PLAN NOTE
10/21 Met SIRS criteria with fever, tachycardia  10/21 CXR - Faint inferior perihilar patchiness with subtle obscuration of the right cardiomediastinal silhouette could represent findings of aspiration in the appropriate clinical context.  Urinalysis - innumerable leukocytes and moderate bacteria on microscopy  Urine culture growing Morganella (susceptible to ceftriaxone)  Blood cultures negative to date  Elevated procalcitonin, bandemia  CT CAP - Multilobar pneumonia. No acute findings in the abdomen or pelvis. Prominent rectal vault stool without bowel obstruction.  Recurrent fevers. Mental status unchanged    Plan  Infectious disease consulted, appreciate recommendations  Continue IV Rocephin (Day 6/7)   Obtain CT temporal bones to rule out mastoiditis

## 2024-10-27 NOTE — ASSESSMENT & PLAN NOTE
-Admission calcium 14.2  - Etiology multifactorial in setting of volume depletion, immobilization, calcium supplements  - Started on intravenous fluids and calcitonin initially  - PTH-appropriately suppressed  - Vitamin D 25 level-91  - PTH RP-  - SPEP-immunofixation no monoclonal band  - UPEP-  - Kappa/lambda ratio 1.2    Overall,  calcium has improved and subsequently was decreasing to hypocalcemia.  But overall is improving 10/27-8.4.  Renal function is slowly improving with creatinine 1.2.  Patient is having higher fevers.  Likely insensible losses.  Borderline elevated potassium 5.5.  Though hyperglycemia is likely contributing to this also.  We will give normal saline.  Continue free water flush for now.    Plan  - Follow-up PTH RP  - Trend calcium for now  - Continue free water flush  - give low dose normal saline today  - Please change tube feeds to low potassium feeds  - Check lab work this evening  - If potassium rises will need Lokelma  - First recommend blood sugar control and repeat BMP this evening  - Fever workup in progress by primary team

## 2024-10-27 NOTE — ASSESSMENT & PLAN NOTE
Lab Results   Component Value Date    HGBA1C 6.2 (H) 08/13/2024       Recent Labs     10/26/24  1207 10/26/24  1738 10/26/24  2330 10/27/24  0509   POCGLU 260* 155* 246* 293*       Blood Sugar Average: Last 72 hrs:  (P) [id number]  Hold home regimen while inpatient and resume on discharge regular insulin 4 units at 6 PM and midnight while tube feeds are running and sliding scale  10/22 Glucose levels elevated overnight, requiring insulin gtt    Plan  Resumed PTA regimen of regular insulin 4 units twice daily (6PM and midnight)  Continue sliding scale Algorithm 1 q6h  Monitor blood glucose. Goal -180 while admitted, adjusting insulin regimen as appropriate

## 2024-10-27 NOTE — ASSESSMENT & PLAN NOTE
Recent Labs     10/25/24  0520 10/26/24  0424 10/27/24  0424   CALCIUM 7.9* 8.0* 8.4   ALB  --  3.5  --      Presented with abnormal lab work showing hypercalcemia of 14.2, repeat CMP showed calcium of 14.8  Per care nurse her mental status was abnormal day prior to admission, but it improved before arrival to ER  Corrected calcium: 14.6, iCal: 1.68, TSH: 0.8  Nephrology was reach out to in the ED recommended calcitonin  Etiology: Possibly calcium supplementation in the setting of immobility  Resolved s/p calcitonin  SPEP/UPEP - No monoclonal immunoglobulins on immunofixation    Plan:  PTHrp pending  Nephrology consulted, appreciate recommendations

## 2024-10-28 ENCOUNTER — APPOINTMENT (INPATIENT)
Dept: RADIOLOGY | Facility: HOSPITAL | Age: 64
DRG: 640 | End: 2024-10-28
Payer: MEDICARE

## 2024-10-28 ENCOUNTER — TELEPHONE (OUTPATIENT)
Dept: FAMILY MEDICINE CLINIC | Facility: CLINIC | Age: 64
End: 2024-10-28

## 2024-10-28 ENCOUNTER — APPOINTMENT (INPATIENT)
Dept: VASCULAR ULTRASOUND | Facility: HOSPITAL | Age: 64
DRG: 640 | End: 2024-10-28
Payer: MEDICARE

## 2024-10-28 PROBLEM — N17.9 AKI (ACUTE KIDNEY INJURY) (HCC): Status: RESOLVED | Noted: 2024-05-10 | Resolved: 2024-10-28

## 2024-10-28 PROBLEM — E83.39 HYPOPHOSPHATEMIA: Status: RESOLVED | Noted: 2024-10-22 | Resolved: 2024-10-28

## 2024-10-28 PROBLEM — E83.52 HYPERCALCEMIA: Status: RESOLVED | Noted: 2021-08-19 | Resolved: 2024-10-28

## 2024-10-28 PROBLEM — E87.3 METABOLIC ALKALOSIS: Status: RESOLVED | Noted: 2024-10-19 | Resolved: 2024-10-28

## 2024-10-28 LAB
ANION GAP SERPL CALCULATED.3IONS-SCNC: 8 MMOL/L (ref 4–13)
ANISOCYTOSIS BLD QL SMEAR: PRESENT
B PARAP IS1001 DNA NPH QL NAA+NON-PROBE: NOT DETECTED
B PERT.PT PRMT NPH QL NAA+NON-PROBE: NOT DETECTED
BUN SERPL-MCNC: 40 MG/DL (ref 5–25)
C PNEUM DNA NPH QL NAA+NON-PROBE: NOT DETECTED
CA-I BLD-SCNC: 1.11 MMOL/L (ref 1.12–1.32)
CALCIUM SERPL-MCNC: 8.7 MG/DL (ref 8.4–10.2)
CHLORIDE SERPL-SCNC: 111 MMOL/L (ref 96–108)
CO2 SERPL-SCNC: 22 MMOL/L (ref 21–32)
CREAT SERPL-MCNC: 1.17 MG/DL (ref 0.6–1.3)
EOSINOPHIL # BLD AUTO: 0.26 THOUSAND/UL (ref 0–0.61)
EOSINOPHIL NFR BLD MANUAL: 2 % (ref 0–6)
ERYTHROCYTE [DISTWIDTH] IN BLOOD BY AUTOMATED COUNT: 13.4 % (ref 11.6–15.1)
FLUAV RNA NPH QL NAA+NON-PROBE: NOT DETECTED
FLUBV RNA NPH QL NAA+NON-PROBE: NOT DETECTED
GFR SERPL CREATININE-BSD FRML MDRD: 50 ML/MIN/1.73SQ M
GLUCOSE SERPL-MCNC: 111 MG/DL (ref 65–140)
GLUCOSE SERPL-MCNC: 143 MG/DL (ref 65–140)
GLUCOSE SERPL-MCNC: 161 MG/DL (ref 65–140)
GLUCOSE SERPL-MCNC: 180 MG/DL (ref 65–140)
GLUCOSE SERPL-MCNC: 196 MG/DL (ref 65–140)
GLUCOSE SERPL-MCNC: 220 MG/DL (ref 65–140)
HADV DNA NPH QL NAA+NON-PROBE: NOT DETECTED
HCOV 229E RNA NPH QL NAA+NON-PROBE: NOT DETECTED
HCOV HKU1 RNA NPH QL NAA+NON-PROBE: NOT DETECTED
HCOV NL63 RNA NPH QL NAA+NON-PROBE: NOT DETECTED
HCOV OC43 RNA NPH QL NAA+NON-PROBE: NOT DETECTED
HCT VFR BLD AUTO: 30.1 % (ref 34.8–46.1)
HGB BLD-MCNC: 9.5 G/DL (ref 11.5–15.4)
HMPV RNA NPH QL NAA+NON-PROBE: NOT DETECTED
HPIV1 RNA NPH QL NAA+NON-PROBE: NOT DETECTED
HPIV2 RNA NPH QL NAA+NON-PROBE: NOT DETECTED
HPIV3 RNA NPH QL NAA+NON-PROBE: NOT DETECTED
HPIV4 RNA NPH QL NAA+NON-PROBE: NOT DETECTED
L PNEUMO1 AG UR QL IA.RAPID: NEGATIVE
LG PLATELETS BLD QL SMEAR: PRESENT
LYMPHOCYTES # BLD AUTO: 27 %
LYMPHOCYTES # BLD AUTO: 3.44 THOUSAND/UL (ref 0.6–4.47)
M PNEUMO DNA NPH QL NAA+NON-PROBE: NOT DETECTED
MAGNESIUM SERPL-MCNC: 2.1 MG/DL (ref 1.9–2.7)
MCH RBC QN AUTO: 33.2 PG (ref 26.8–34.3)
MCHC RBC AUTO-ENTMCNC: 31.6 G/DL (ref 31.4–37.4)
MCV RBC AUTO: 105 FL (ref 82–98)
MONOCYTES # BLD AUTO: 1.02 THOUSAND/UL (ref 0–1.22)
MONOCYTES NFR BLD AUTO: 8 % (ref 4–12)
MYELOCYTES # BLD MANUAL: 0.51 THOUSAND/UL (ref 0–0.1)
MYELOCYTES NFR BLD MANUAL: 4 % (ref 0–1)
NEUTS BAND NFR BLD MANUAL: 10 % (ref 0–8)
NEUTS SEG # BLD: 7.52 THOUSAND/UL (ref 1.81–6.82)
NEUTS SEG NFR BLD AUTO: 49 %
PHOSPHATE SERPL-MCNC: 3.1 MG/DL (ref 2.3–4.1)
PLATELET # BLD AUTO: 256 THOUSANDS/UL (ref 149–390)
PLATELET BLD QL SMEAR: ADEQUATE
PMV BLD AUTO: 12.1 FL (ref 8.9–12.7)
POTASSIUM SERPL-SCNC: 5.1 MMOL/L (ref 3.5–5.3)
RBC # BLD AUTO: 2.86 MILLION/UL (ref 3.81–5.12)
RBC MORPH BLD: PRESENT
RSV RNA NPH QL NAA+NON-PROBE: NOT DETECTED
RV+EV RNA NPH QL NAA+NON-PROBE: NOT DETECTED
SARS-COV-2 RNA NPH QL NAA+NON-PROBE: NOT DETECTED
SODIUM SERPL-SCNC: 141 MMOL/L (ref 135–147)
TOTAL CELLS COUNTED SPEC: 100
WBC # BLD AUTO: 12.75 THOUSAND/UL (ref 4.31–10.16)

## 2024-10-28 PROCEDURE — 84100 ASSAY OF PHOSPHORUS: CPT | Performed by: INTERNAL MEDICINE

## 2024-10-28 PROCEDURE — 99233 SBSQ HOSP IP/OBS HIGH 50: CPT | Performed by: INTERNAL MEDICINE

## 2024-10-28 PROCEDURE — 93970 EXTREMITY STUDY: CPT

## 2024-10-28 PROCEDURE — 93970 EXTREMITY STUDY: CPT | Performed by: SURGERY

## 2024-10-28 PROCEDURE — 80048 BASIC METABOLIC PNL TOTAL CA: CPT | Performed by: INTERNAL MEDICINE

## 2024-10-28 PROCEDURE — 0202U NFCT DS 22 TRGT SARS-COV-2: CPT | Performed by: INTERNAL MEDICINE

## 2024-10-28 PROCEDURE — 85027 COMPLETE CBC AUTOMATED: CPT | Performed by: INTERNAL MEDICINE

## 2024-10-28 PROCEDURE — 87449 NOS EACH ORGANISM AG IA: CPT | Performed by: INTERNAL MEDICINE

## 2024-10-28 PROCEDURE — 82330 ASSAY OF CALCIUM: CPT

## 2024-10-28 PROCEDURE — 83735 ASSAY OF MAGNESIUM: CPT | Performed by: INTERNAL MEDICINE

## 2024-10-28 PROCEDURE — 85007 BL SMEAR W/DIFF WBC COUNT: CPT

## 2024-10-28 PROCEDURE — 71045 X-RAY EXAM CHEST 1 VIEW: CPT

## 2024-10-28 PROCEDURE — 82948 REAGENT STRIP/BLOOD GLUCOSE: CPT

## 2024-10-28 PROCEDURE — 99232 SBSQ HOSP IP/OBS MODERATE 35: CPT | Performed by: INTERNAL MEDICINE

## 2024-10-28 RX ORDER — CARBIDOPA AND LEVODOPA 25; 100 MG/1; MG/1
1 TABLET ORAL 4 TIMES DAILY
Status: DISCONTINUED | OUTPATIENT
Start: 2024-10-28 | End: 2024-11-04 | Stop reason: HOSPADM

## 2024-10-28 RX ADMIN — VALPROIC ACID 125 MG: 250 SOLUTION ORAL at 21:44

## 2024-10-28 RX ADMIN — POLYETHYLENE GLYCOL 3350 17 G: 17 POWDER, FOR SOLUTION ORAL at 09:58

## 2024-10-28 RX ADMIN — HEPARIN SODIUM 5000 UNITS: 5000 INJECTION INTRAVENOUS; SUBCUTANEOUS at 12:51

## 2024-10-28 RX ADMIN — INSULIN LISPRO 1 UNITS: 100 INJECTION, SOLUTION INTRAVENOUS; SUBCUTANEOUS at 12:51

## 2024-10-28 RX ADMIN — OXYCODONE HYDROCHLORIDE AND ACETAMINOPHEN 1000 MG: 500 TABLET ORAL at 09:58

## 2024-10-28 RX ADMIN — PANTOPRAZOLE SODIUM 20 MG: 40 INJECTION, POWDER, FOR SOLUTION INTRAVENOUS at 09:58

## 2024-10-28 RX ADMIN — LORAZEPAM 0.5 MG: 0.5 TABLET ORAL at 21:43

## 2024-10-28 RX ADMIN — ARIPIPRAZOLE 2 MG: 2 TABLET ORAL at 21:44

## 2024-10-28 RX ADMIN — INSULIN LISPRO 1 UNITS: 100 INJECTION, SOLUTION INTRAVENOUS; SUBCUTANEOUS at 18:34

## 2024-10-28 RX ADMIN — PRAVASTATIN SODIUM 40 MG: 40 TABLET ORAL at 15:22

## 2024-10-28 RX ADMIN — INSULIN HUMAN 2 UNITS: 100 INJECTION, SOLUTION PARENTERAL at 01:06

## 2024-10-28 RX ADMIN — CEFTRIAXONE 1000 MG: 2 INJECTION, POWDER, FOR SOLUTION INTRAMUSCULAR; INTRAVENOUS at 15:16

## 2024-10-28 RX ADMIN — CARBIDOPA AND LEVODOPA 1 TABLET: 25; 100 TABLET ORAL at 12:51

## 2024-10-28 RX ADMIN — HEPARIN SODIUM 5000 UNITS: 5000 INJECTION INTRAVENOUS; SUBCUTANEOUS at 06:20

## 2024-10-28 RX ADMIN — HEPARIN SODIUM 5000 UNITS: 5000 INJECTION INTRAVENOUS; SUBCUTANEOUS at 21:44

## 2024-10-28 RX ADMIN — INSULIN LISPRO 1 UNITS: 100 INJECTION, SOLUTION INTRAVENOUS; SUBCUTANEOUS at 06:27

## 2024-10-28 RX ADMIN — BACLOFEN 10 MG: 10 TABLET ORAL at 15:22

## 2024-10-28 RX ADMIN — INSULIN HUMAN 4 UNITS: 100 INJECTION, SOLUTION PARENTERAL at 18:33

## 2024-10-28 RX ADMIN — SENNOSIDES AND DOCUSATE SODIUM 1 TABLET: 50; 8.6 TABLET ORAL at 21:44

## 2024-10-28 RX ADMIN — OXYBUTYNIN CHLORIDE 5 MG: 5 TABLET ORAL at 15:22

## 2024-10-28 RX ADMIN — VALPROIC ACID 125 MG: 250 SOLUTION ORAL at 09:57

## 2024-10-28 RX ADMIN — BACLOFEN 10 MG: 10 TABLET ORAL at 21:44

## 2024-10-28 RX ADMIN — ACETAMINOPHEN 650 MG: 325 TABLET ORAL at 09:58

## 2024-10-28 RX ADMIN — LEVOTHYROXINE SODIUM 25 MCG: 25 TABLET ORAL at 06:29

## 2024-10-28 RX ADMIN — OXYBUTYNIN CHLORIDE 5 MG: 5 TABLET ORAL at 21:44

## 2024-10-28 RX ADMIN — CARBIDOPA AND LEVODOPA 1 TABLET: 25; 100 TABLET ORAL at 18:33

## 2024-10-28 RX ADMIN — BACLOFEN 10 MG: 10 TABLET ORAL at 09:58

## 2024-10-28 RX ADMIN — CITALOPRAM HYDROBROMIDE 20 MG: 20 TABLET, FILM COATED ORAL at 09:58

## 2024-10-28 RX ADMIN — OXYBUTYNIN CHLORIDE 5 MG: 5 TABLET ORAL at 09:58

## 2024-10-28 RX ADMIN — CARBIDOPA AND LEVODOPA 1 TABLET: 25; 100 TABLET ORAL at 21:44

## 2024-10-28 NOTE — ASSESSMENT & PLAN NOTE
-Admission calcium 14.2  - Etiology multifactorial in setting of volume depletion, immobilization, calcium supplements  - Off fluids and calcitonin  - PTH-appropriately suppressed  - Vitamin D 25 level-91  - PTH RP-negative as it is less than 2  - SPEP-immunofixation no monoclonal band  - UPEP-shows no monoclonal bands  - Kappa/lambda ratio 1.2    Initially hypercalcemic then became hypocalcemic.  Ionized calcium level has improved to 1.11 which is close to normal.    Plan  --Can monitor off fluids  -- Can check ionized calcium level once a week  -- Stable from his standpoint we will sign off call for questions or concerns.

## 2024-10-28 NOTE — ASSESSMENT & PLAN NOTE
Recent Labs     10/26/24  0424 10/27/24  0424 10/27/24  1744 10/28/24  0502   CALCIUM 8.0* 8.4 9.2 8.7   ALB 3.5  --   --   --      Presented with abnormal lab work showing hypercalcemia of 14.2, repeat CMP showed calcium of 14.8  Per care nurse her mental status was abnormal day prior to admission, but it improved before arrival to ER  Corrected calcium: 14.6, iCal: 1.68, TSH: 0.8  Nephrology was reach out to in the ED recommended calcitonin  Etiology: Possibly calcium supplementation in the setting of immobility  Resolved s/p calcitonin  SPEP/UPEP - No monoclonal immunoglobulins on immunofixation  PTHrp <2.0, negative    Plan:  Nephrology on board, appreciate recommendations  Calcium levels improved, no additional management at this time  Will check ionized calcium level once a week while admitted

## 2024-10-28 NOTE — ASSESSMENT & PLAN NOTE
-Chest x-ray possible opacity  - VQ scan-low probability of PE  - Concern for ongoing fevers.  Workup in progress by primary team  - CT head pending    Potassium is normal at 5.1

## 2024-10-28 NOTE — PROGRESS NOTES
Progress Note - Infectious Disease   Name: Terrie Alicea 64 y.o. female I MRN: [id number]  Unit/Bed#: S -01 I Date of Admission: 10/18/2024   Date of Service: 10/28/2024 I Hospital Day: 10    Assessment & Plan  Sepsis (HCC)  Vs SIRS.  As evidenced by fever, tachycardia, and leukocytosis.  Blood cultures without growth.  UA with TNTC WBC, growing Morganella.  COVID/flu/RSV negative.  No LFT abnormalities.  Fortunately, she remains hemodynamically stable.  CTAP with multifocal pulmonary infiltrates, no abdominal pathology.  She completed 7 day course of ceftriaxone for pneumonia vs UTI (cx Morganella).    - Continued fevers:  repeat Chest XR, check RP2 and urine Legionella antigen, dopplers to r/o DVT  - Hold on further antibiotics for now  - Monitor CBC with differential  UTI (urinary tract infection)  Urine culture growing Morganella, TNTC WBC.  No indwelling catheter.  - Completed ceftriaxone course  Hypercalcemia  Improved.  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  Cerebral palsy (HCC)  Continue home medications  Bipolar disorder ([facility])  Continue home medications  Diabetes mellitus type 2, insulin dependent ([facility])  Lab Results   Component Value Date    HGBA1C 6.2 (H) 08/13/2024     Recent Labs     10/28/24  0024 10/28/24  0211 10/28/24  0612 10/28/24  1243   POCGLU 111 143* 196* 220*   This is a risk factor for infection.  Well-controlled in this patient.  - Maintain euglycemia to improve WBC function    SCAR (acute kidney injury) ([facility])  Estimated Creatinine Clearance: 31.5 mL/min (by C-G formula based on SCr of 1.17 mg/dL).   - Dose adjust antimicrobials / medications      I have discussed the above management plan in detail with the primary service, Dr Ricardo, who agrees with additional workup above for fever.    I have performed an extensive review of the medical records in Epic including review of the notes, radiographs, and laboratory results     Antibiotics:  Ceftriaxone day 7    24  Hour Events:  Continued low-grade fevers    Subjective:  Patient non-verbal.    Objective:  Vitals:  Temp:  [99.1 °F (37.3 °C)-100.9 °F (38.3 °C)] 100.3 °F (37.9 °C)  HR:  [] 96  Resp:  [16-20] 20  BP: (109-135)/(74-81) 126/79  SpO2:  [96 %-98 %] 97 %  Temp (24hrs), Av °F (37.8 °C), Min:99.1 °F (37.3 °C), Max:100.9 °F (38.3 °C)  Current: Temperature: 100.3 °F (37.9 °C)      Physical Exam:   General: Appears chronically ill, in no apparent distress  HEENT:  Normocephalic, sclera anicteric, MMM  Heart:  RRR, no murmurs, rubs, or gallops  Lungs:  Clear to auscultation bilaterally  Abdomen:  Soft, nontender, nondistended, normal bowel sounds  :  No suprapubic tenderness  Extremities:  No cyanosis or edema  Skin:  No rashes, lesions on exposed skin; PEG site clean  Neuro:  Awake, alert, tracking to voice      Labs:   All pertinent labs and imaging studies were personally reviewed  Results from last 7 days   Lab Units 10/28/24  0502 10/27/24  0424 10/26/24  0424   WBC Thousand/uL 12.75* 11.66* 10.43*   HEMOGLOBIN g/dL 9.5* 9.7* 9.6*   PLATELETS Thousands/uL 256 223 214     Results from last 7 days   Lab Units 10/28/24  0502 10/27/24  1744 10/27/24  0424 10/26/24  0424   SODIUM mmol/L 141 147 141 143   POTASSIUM mmol/L 5.1 4.3 5.5* 5.4*   CHLORIDE mmol/L 111* 114* 110* 114*   CO2 mmol/L 22 24 23 23   BUN mg/dL 40* 35* 44* 44*   CREATININE mg/dL 1.17 1.27 1.29 1.32*   EGFR ml/min/1.73sq m 50 45 44 43   CALCIUM mg/dL 8.7 9.2 8.4 8.0*   AST U/L  --   --   --  37   ALT U/L  --   --   --  14   ALK PHOS U/L  --   --   --  60     Results from last 7 days   Lab Units 10/24/24  0802 10/22/24  0506   PROCALCITONIN ng/ml 3.26* 4.79*                     Microbiology:  Results from last 7 days   Lab Units 10/22/24  1046 10/22/24  0902   BLOOD CULTURE   --  No Growth After 5 Days.  No Growth After 5 Days.   URINE CULTURE  >100,000 cfu/ml Morganella morganii*  --        Imaging:  Chest XR 10/28/24:  I have independently  reviewed pertinent imaging study reports and images in PACS.  There is some patchy perihilar hazy opacities (more so on the left).      Leander Dodd MD  [facility]

## 2024-10-28 NOTE — ASSESSMENT & PLAN NOTE
Recent Labs     10/26/24  0424 10/27/24  0424 10/28/24  0502   HGB 9.6* 9.7* 9.5*   Baseline: 9-11, currently hemoglobin is baseline  Ordered iron studies  Daily CBC  Continue PTA ferrous sulfate  Transfuse if hemoglobin less than 7

## 2024-10-28 NOTE — ASSESSMENT & PLAN NOTE
Urine culture growing Morganella, TNTC WBC.  No indwelling catheter.  - Completed ceftriaxone course

## 2024-10-28 NOTE — ASSESSMENT & PLAN NOTE
On Sinemet CR  mg 2 tablets BID at home  Switched to Sinemet immediate release  mg 1 tablet 4 times daily while admitted

## 2024-10-28 NOTE — PLAN OF CARE
Problem: PAIN - ADULT  Goal: Verbalizes/displays adequate comfort level or baseline comfort level  Description: Interventions:  - Encourage patient to monitor pain and request assistance  - Assess pain using appropriate pain scale  - Administer analgesics based on type and severity of pain and evaluate response  - Implement non-pharmacological measures as appropriate and evaluate response  - Consider cultural and social influences on pain and pain management  - Notify physician/advanced practitioner if interventions unsuccessful or patient reports new pain  Outcome: Progressing     Problem: INFECTION - ADULT  Goal: Absence or prevention of progression during hospitalization  Description: INTERVENTIONS:  - Assess and monitor for signs and symptoms of infection  - Monitor lab/diagnostic results  - Monitor all insertion sites, i.e. indwelling lines, tubes, and drains  - Monitor endotracheal if appropriate and nasal secretions for changes in amount and color  - [place] appropriate cooling/warming therapies per order  - Administer medications as ordered  - Instruct and encourage patient and family to use good hand hygiene technique  - Identify and instruct in appropriate isolation precautions for identified infection/condition  Outcome: Progressing     Problem: SAFETY ADULT  Goal: Patient will remain free of falls  Description: INTERVENTIONS:  - Educate patient/family on patient safety including physical limitations  - Instruct patient to call for assistance with activity   - Consult OT/PT to assist with strengthening/mobility   - Keep Call bell within reach  - Keep bed low and locked with side rails adjusted as appropriate  - Keep care items and personal belongings within reach  - Initiate and maintain comfort rounds  - Make Fall Risk Sign visible to staff  - Apply yellow socks and bracelet for high fall risk patients  - Consider moving patient to room near nurses station  Outcome: Progressing

## 2024-10-28 NOTE — PROGRESS NOTES
Progress Note - Hospitalist   Name: Terrie Alicea 64 y.o. female I MRN: [id number]  Unit/Bed#: S -01 I Date of Admission: 10/18/2024   Date of Service: 10/28/2024 I Hospital Day: 10    Assessment & Plan  Sepsis (HCC)  10/21 Met SIRS criteria with fever, tachycardia  10/21 CXR - Faint inferior perihilar patchiness with subtle obscuration of the right cardiomediastinal silhouette could represent findings of aspiration in the appropriate clinical context.  Urinalysis - innumerable leukocytes and moderate bacteria on microscopy  Urine culture growing Morganella (susceptible to ceftriaxone)  Blood cultures negative to date  Elevated procalcitonin, bandemia  CT CAP - Multilobar pneumonia. No acute findings in the abdomen or pelvis. Prominent rectal vault stool without bowel obstruction.  CT temporal bones - no evidence of mastoiditis  Recurrent fevers. Worsening leukocytosis, bandemia. Mental status unchanged  10/28 CXR showed improved bilateral perihilar consolidations suggesting improved pneumonia    Plan  Infectious disease consulted, appreciate recommendations  Day 7/7 of IV Rocephin. Will hold further abx after  Follow-up RP2  Follow-up LE Dopplers  Follow-up Legionella antigen    Pneumonia  See plan for sepsis  UTI (urinary tract infection)  See plan for sepsis  SCAR (acute kidney injury) ([facility]) (Resolved: 10/28/2024)  Recent Labs     10/27/24  0424 10/27/24  1744 10/28/24  0502   CREATININE 1.29 1.27 1.17   EGFR 44 45 50     Estimated Creatinine Clearance: 31.5 mL/min (by C-G formula based on SCr of 1.17 mg/dL).    POA 2.83; (baseline 0.8-1)  US kidney and bladder - Bilateral small echogenic kidneys of medical renal disease.   Per Nephrology, likely secondary to vasoconstriction and hemodynamic changes in setting of hypercalcemia     Plan:  Nephrology consulted, appreciate recommendations  Creatinine improving, monitor off fluids  Continue free water flushes  Monitor I/Os  Avoid nephrotoxins  Avoid  hypoperfusion  Diabetes mellitus type 2, insulin dependent ([facility])  Lab Results   Component Value Date    HGBA1C 6.2 (H) 08/13/2024       Recent Labs     10/28/24  0024 10/28/24  0211 10/28/24  0612 10/28/24  1243   POCGLU 111 143* 196* 220*       Blood Sugar Average: Last 72 hrs:  (P) [id number]  Home regimen: regular insulin 4 units twice daily at 6PM and midnight  10/22 Glucose levels elevated overnight, requiring insulin gtt    Plan  Resumed PTA regimen of regular insulin 4 units twice daily (6PM and midnight)  Continue sliding scale Algorithm 1 q6h  Monitor blood glucose.   Goal -180 while admitted, adjusting insulin regimen as appropriate  Hypercalcemia (Resolved: 10/28/2024)  Recent Labs     10/26/24  0424 10/27/24  0424 10/27/24  1744 10/28/24  0502   CALCIUM 8.0* 8.4 9.2 8.7   ALB 3.5  --   --   --      Presented with abnormal lab work showing hypercalcemia of 14.2, repeat CMP showed calcium of 14.8  Per care nurse her mental status was abnormal day prior to admission, but it improved before arrival to ER  Corrected calcium: 14.6, iCal: 1.68, TSH: 0.8  Nephrology was reach out to in the ED recommended calcitonin  Etiology: Possibly calcium supplementation in the setting of immobility  Resolved s/p calcitonin  SPEP/UPEP - No monoclonal immunoglobulins on immunofixation  PTHrp <2.0, negative    Plan:  Nephrology on board, appreciate recommendations  Calcium levels improved, no additional management at this time  Will check ionized calcium level once a week while admitted  Cerebral palsy (HCC)  At baseline is non verbal , totally dependent on care, resides in intermediate care facility  Continue PTA baclofen TID  Bipolar disorder (HCC)  Continue PTA Depakene and Abilify  Iron deficiency anemia  Recent Labs     10/26/24  0424 10/27/24  0424 10/28/24  0502   HGB 9.6* 9.7* 9.5*   Baseline: 9-11, currently hemoglobin is baseline  Ordered iron studies  Daily CBC  Continue PTA ferrous  sulfate  Transfuse if hemoglobin less than 7  Hypothyroidism  Continue PTA levothyroxine  TSH and T4 within normal limits  Parkinson's disease (HCC)  On Sinemet CR  mg 2 tablets BID at home  Switched to Sinemet immediate release  mg 1 tablet 4 times daily while admitted    VTE Pharmacologic Prophylaxis: VTE Score: 4 Moderate Risk (Score 3-4) - Pharmacological DVT Prophylaxis Ordered: heparin.    Mobility:   Basic Mobility Inpatient Raw Score: 6  JH-HLM Goal: 2: Bed activities/Dependent transfer  JH-HLM Achieved: 2: Bed activities/Dependent transfer  JH-HLM Goal achieved. Continue to encourage appropriate mobility.    Patient Centered Rounds: I performed bedside rounds with nursing staff today.   Discussions with Specialists or Other Care Team Provider: ID, Nephrology    Education and Discussions with Family / Patient: Updated  (caregiver, Lisa) via phone.    Current Length of Stay: 10 day(s)  Current Patient Status: Inpatient   Certification Statement: The patient will continue to require additional inpatient hospital stay due to continued fevers despite antibiotic management  Discharge Plan: Anticipate discharge in 48-72 hrs to group home.    Code Status: Level 1 - Full Code    Subjective   Patient had no acute events overnight. Patient was seen and examined at bedside. She was lying in bed, in no acute distress. Patient continues to be febrile, Tmax today of 100.9 F. No new skin breakdown noted. Last BM recorded was yesterday.     Objective :  Temp:  [99.1 °F (37.3 °C)-100.9 °F (38.3 °C)] 100.3 °F (37.9 °C)  HR:  [] 96  BP: (109-135)/(74-81) 126/79  Resp:  [16-20] 20  SpO2:  [96 %-98 %] 97 %  O2 Device: None (Room air)    Body mass index is 17.81 kg/m².     Input and Output Summary (last 24 hours):     Intake/Output Summary (Last 24 hours) at 10/28/2024 1314  Last data filed at 10/28/2024 1302  Gross per 24 hour   Intake 250 ml   Output 763 ml   Net -513 ml       Physical  Exam  Vitals reviewed.   Constitutional:       General: She is not in acute distress.     Appearance: She is well-developed.   HENT:      Head: Normocephalic and atraumatic.   Eyes:      Conjunctiva/sclera: Conjunctivae normal.   Cardiovascular:      Rate and Rhythm: Normal rate and regular rhythm.      Heart sounds: No murmur heard.  Pulmonary:      Effort: Pulmonary effort is normal. No respiratory distress.      Breath sounds: Normal breath sounds.   Abdominal:      Palpations: Abdomen is soft.      Tenderness: There is no abdominal tenderness.   Musculoskeletal:         General: No swelling.      Cervical back: Neck supple.   Skin:     General: Skin is warm and dry.      Capillary Refill: Capillary refill takes less than 2 seconds.   Neurological:      Mental Status: She is alert.   Psychiatric:         Mood and Affect: Mood normal.         Lines/Drains:        Lab Results: I have reviewed the following results:   Results from last 7 days   Lab Units 10/28/24  0502   WBC Thousand/uL 12.75*   HEMOGLOBIN g/dL 9.5*   HEMATOCRIT % 30.1*   PLATELETS Thousands/uL 256   BANDS PCT % 10*   LYMPHO PCT % 27   MONO PCT % 8   EOS PCT % 2     Results from last 7 days   Lab Units 10/28/24  0502 10/27/24  0424 10/26/24  0424   SODIUM mmol/L 141   < > 143   POTASSIUM mmol/L 5.1   < > 5.4*   CHLORIDE mmol/L 111*   < > 114*   CO2 mmol/L 22   < > 23   BUN mg/dL 40*   < > 44*   CREATININE mg/dL 1.17   < > 1.32*   ANION GAP mmol/L 8   < > 6   CALCIUM mg/dL 8.7   < > 8.0*   ALBUMIN g/dL  --   --  3.5   TOTAL BILIRUBIN mg/dL  --   --  0.19*   ALK PHOS U/L  --   --  60   ALT U/L  --   --  14   AST U/L  --   --  37   GLUCOSE RANDOM mg/dL 180*   < > 165*    < > = values in this interval not displayed.     Results from last 7 days   Lab Units 10/22/24  1209   INR  0.98     Results from last 7 days   Lab Units 10/28/24  1243 10/28/24  0612 10/28/24  0211 10/28/24  0024 10/27/24  1743 10/27/24  1205 10/27/24  0509 10/26/24  2330  10/26/24  1738 10/26/24  1207 10/26/24  0554 10/25/24  2351   POC GLUCOSE mg/dl 220* 196* 143* 111 61* 271* 293* 246* 155* 260* 206* 180*         Results from last 7 days   Lab Units 10/24/24  0802 10/22/24  0506   PROCALCITONIN ng/ml 3.26* 4.79*       Recent Cultures (last 7 days):   Results from last 7 days   Lab Units 10/22/24  1046 10/22/24  0902   BLOOD CULTURE   --  No Growth After 5 Days.  No Growth After 5 Days.   URINE CULTURE  >100,000 cfu/ml Morganella morganii*  --              Last 24 Hours Medication List:     Current Facility-Administered Medications:     acetaminophen (TYLENOL) tablet 650 mg, Q6H PRN    ARIPiprazole (ABILIFY) tablet 2 mg, HS    ascorbic acid (VITAMIN C) tablet 1,000 mg, Daily    baclofen tablet 10 mg, TID    carbidopa-levodopa (SINEMET)  mg per tablet 1 tablet, 4x Daily    ceftriaxone (ROCEPHIN) 1 g/50 mL in dextrose IVPB, Q24H, Last Rate: 1,000 mg (10/27/24 1404)    citalopram (CeleXA) tablet 20 mg, Daily    Ferrous Sulfate oral syrup 300 mg, Every Other Day    heparin (porcine) subcutaneous injection 5,000 Units, Q8H [facility]    influenza vaccine, recombinant (PF) (Flublok) IM injection 0.5 mL, Once    insulin lispro (HumALOG/ADMELOG) 100 units/mL subcutaneous injection 1-5 Units, Q6H [facility] **AND** Fingerstick Glucose (POCT), Q6H    insulin regular (HumuLIN R,NovoLIN R) injection 4 Units, BID    Ketotifen Fumarate (ZADITOR) 0.035 % ophthalmic solution 1 drop, BID PRN    levothyroxine tablet 25 mcg, Early Morning    LORazepam (ATIVAN) tablet 0.5 mg, HS    midodrine (PROAMATINE) tablet 5 mg, TID PRN    oxybutynin (DITROPAN) tablet 5 mg, TID    pantoprazole (PROTONIX) injection 20 mg, Q24H [facility]    polyethylene glycol (MIRALAX) packet 17 g, Daily    pravastatin (PRAVACHOL) tablet 40 mg, Daily With Dinner    senna-docusate sodium (SENOKOT S) 8.6-50 mg per tablet 1 tablet, HS    traZODone (DESYREL) tablet 100 mg, HS PRN    valproic acid (DEPAKENE) oral soln 125 mg, BID    Administrative  Statements   Today, Patient Was Seen By: Melonie Coleman MD      **Please Note: This note may have been constructed using a voice recognition system.**

## 2024-10-28 NOTE — ASSESSMENT & PLAN NOTE
Recent Labs     10/27/24  0424 10/27/24  1744 10/28/24  0502   CREATININE 1.29 1.27 1.17   EGFR 44 45 50     Estimated Creatinine Clearance: 31.5 mL/min (by C-G formula based on SCr of 1.17 mg/dL).    POA 2.83; (baseline 0.8-1)  US kidney and bladder - Bilateral small echogenic kidneys of medical renal disease.   Per Nephrology, likely secondary to vasoconstriction and hemodynamic changes in setting of hypercalcemia     Plan:  Nephrology consulted, appreciate recommendations  Creatinine improving, monitor off fluids  Continue free water flushes  Monitor I/Os  Avoid nephrotoxins  Avoid hypoperfusion

## 2024-10-28 NOTE — PROGRESS NOTES
Progress Note - Nephrology   Name: Terrie Alicea 64 y.o. female I MRN: [id number]  Unit/Bed#: S -01 I Date of Admission: 10/18/2024   Date of Service: 10/28/2024 I Hospital Day: 10     Assessment & Plan  Hypercalcemia (Resolved: 10/28/2024)  -Admission calcium 14.2  - Etiology multifactorial in setting of volume depletion, immobilization, calcium supplements  - Off fluids and calcitonin  - PTH-appropriately suppressed  - Vitamin D 25 level-91  - PTH RP-negative as it is less than 2  - SPEP-immunofixation no monoclonal band  - UPEP-shows no monoclonal bands  - Kappa/lambda ratio 1.2    Initially hypercalcemic then became hypocalcemic.  Ionized calcium level has improved to 1.11 which is close to normal.    Plan  --Can monitor off fluids  -- Can check ionized calcium level once a week  -- Stable from his standpoint we will sign off call for questions or concerns.    SCAR (acute kidney injury) (HCC) (Resolved: 10/28/2024)  -Likely secondary to hypercalcemia/volume depletion leading to prerenal/ATN etiology  - Baseline creatinine 0.8 mg/dL  - Admission creatinine 2.8 mg/dL  - Urinalysis without hematuria  - Renal imaging-no hydronephrosis  --Creatinine improved to 1.2 mg/dL  Cerebral palsy (HCC)  -Per primary team  Diabetes mellitus type 2, insulin dependent (HCC)  -Per primary team  Iron deficiency anemia  -Per primary team  - Received IV Venofer 10/21  Sepsis (HCC)  -Management per primary team  - ID team on board  - On ceftriaxone  UTI (urinary tract infection)  -Urine culture with Morganella  - On ceftriaxone  Pneumonia  -Chest x-ray possible opacity  - VQ scan-low probability of PE  - Concern for ongoing fevers.  Workup in progress by primary team  - CT head pending    Potassium is normal at 5.1    I have reviewed the nephrology recommendations including assessment and plan, with nurse, and we are in agreement with renal plan including the information outlined above. Nephrology service signing  off.    Subjective   Brief History of Admission - 64-year-old female with a past medical history of cerebral palsy, diabetes, Parkinson, hyperlipidemia who initially presents with hypercalcemia and SCAR. Nephrology is consulted for SCAR and hypercalcemia. Course has been complicated by sepsis and fevers. Hypercalcemia has resolved     No overnight events.  COVID rule out.    Objective :  Temp:  [99.1 °F (37.3 °C)-100.9 °F (38.3 °C)] 100.3 °F (37.9 °C)  HR:  [] 96  BP: (109-135)/(74-81) 126/79  Resp:  [16-20] 20  SpO2:  [96 %-98 %] 97 %  O2 Device: None (Room air)    Current Weight: Weight - Scale: 40 kg (88 lb 2.9 oz)  First Weight: Weight - Scale: 40 kg (88 lb 2.9 oz)  I/O         10/26 0701  10/27 0700 10/27 0701  10/28 0700 10/28 0701  10/29 0700    P.O.  0 0    NG/ 250     Total Intake(mL/kg) 500 (12.5) 250 (6.3) 0 (0)    Urine (mL/kg/hr) 680 (0.7)  763 (2.9)    Total Output 680  763    Net -180 +250 -763           Unmeasured Urine Occurrence 4 x 4 x     Unmeasured Stool Occurrence  2 x           Physical Exam  Vitals and nursing note reviewed. Exam conducted with a chaperone present.   Constitutional:       General: She is not in acute distress.     Appearance: Normal appearance. She is not ill-appearing, toxic-appearing or diaphoretic.   HENT:      Head: Normocephalic and atraumatic.      Mouth/Throat:      Pharynx: No oropharyngeal exudate.   Eyes:      General: No scleral icterus.        Right eye: No discharge.         Left eye: No discharge.   Cardiovascular:      Rate and Rhythm: Normal rate.   Pulmonary:      Effort: No respiratory distress.   Abdominal:      General: There is no distension.      Tenderness: There is no guarding.   Musculoskeletal:         General: No swelling.   Skin:     Coloration: Skin is pale. Skin is not jaundiced.      Findings: No bruising, erythema, lesion or rash.   Neurological:      Mental Status: She is alert.         Medications:    Current Facility-Administered  Medications:     acetaminophen (TYLENOL) tablet 650 mg, 650 mg, Per G Tube, Q6H PRN, Jennifer Hernandez MD, 650 mg at 10/28/24 0958    ARIPiprazole (ABILIFY) tablet 2 mg, 2 mg, Per G Tube, HS, Leonidas Sharma MD, 2 mg at 10/27/24 2205    ascorbic acid (VITAMIN C) tablet 1,000 mg, 1,000 mg, Per G Tube, Daily, Leonidas Sharma MD, 1,000 mg at 10/28/24 0958    baclofen tablet 10 mg, 10 mg, Per G Tube, TID, Keiry Ricardo MD, 10 mg at 10/28/24 0958    carbidopa-levodopa (SINEMET)  mg per tablet 1 tablet, 1 tablet, Oral, 4x Daily, Melonie Coleman MD, 1 tablet at 10/28/24 1251    ceftriaxone (ROCEPHIN) 1 g/50 mL in dextrose IVPB, 1,000 mg, Intravenous, Q24H, Leander Dodd MD, Last Rate: 100 mL/hr at 10/27/24 1404, 1,000 mg at 10/27/24 1404    citalopram (CeleXA) tablet 20 mg, 20 mg, Per G Tube, Daily, Leonidas Sharma MD, 20 mg at 10/28/24 0958    Ferrous Sulfate oral syrup 300 mg, 300 mg, Per G Tube, Every Other Day, Leonidas Sharma MD, 300 mg at 10/27/24 0953    heparin (porcine) subcutaneous injection 5,000 Units, 5,000 Units, Subcutaneous, Q8H ROSEMARY, Keiry Ricardo MD, 5,000 Units at 10/28/24 1251    influenza vaccine, recombinant (PF) (Flublok) IM injection 0.5 mL, 0.5 mL, Intramuscular, Once, Nicolas Lopez DO    insulin lispro (HumALOG/ADMELOG) 100 units/mL subcutaneous injection 1-5 Units, 1-5 Units, Subcutaneous, Q6H ROSEMARY, 1 Units at 10/28/24 1251 **AND** Fingerstick Glucose (POCT), , , Q6H, Leonidas Sharma MD    insulin regular (HumuLIN R,NovoLIN R) injection 4 Units, 4 Units, Subcutaneous, BID, Jennifer Hernandez MD, 2 Units at 10/28/24 0106    Ketotifen Fumarate (ZADITOR) 0.035 % ophthalmic solution 1 drop, 1 drop, Both Eyes, BID PRN, Leonidas Sharma MD    levothyroxine tablet 25 mcg, 25 mcg, Per G Tube, Early Morning, Leonidas Sharma MD, 25 mcg at 10/28/24 0629    LORazepam (ATIVAN) tablet 0.5 mg, 0.5 mg, Per G Tube, HS, Keiry Ricardo MD, 0.5 mg at 10/27/24 4435    midodrine (PROAMATINE) tablet 5 mg, 5 mg, Per G  Tube, TID PRN, Leonidas Sharma MD    oxybutynin (DITROPAN) tablet 5 mg, 5 mg, Per G Tube, TID, Leonidas Sharma MD, 5 mg at 10/28/24 0958    pantoprazole (PROTONIX) injection 20 mg, 20 mg, Intravenous, Q24H ROSEMARY, Leonidas Sharma MD, 20 mg at 10/28/24 0958    polyethylene glycol (MIRALAX) packet 17 g, 17 g, Per G Tube, Daily, Keiry Ricardo MD, 17 g at 10/28/24 0958    pravastatin (PRAVACHOL) tablet 40 mg, 40 mg, Per G Tube, Daily With Dinner, Leonidas Sharma MD, 40 mg at 10/27/24 1701    senna-docusate sodium (SENOKOT S) 8.6-50 mg per tablet 1 tablet, 1 tablet, Per G Tube, HS, Yolanda Kay DO, 1 tablet at 10/27/24 2142    traZODone (DESYREL) tablet 100 mg, 100 mg, Per G Tube, HS PRN, Jennifer Hernandez MD    valproic acid (DEPAKENE) oral soln 125 mg, 125 mg, Per G Tube, BID, Leonidas Sharma MD, 125 mg at 10/28/24 0957      Lab Results: I have reviewed the following results:  Results from last 7 days   Lab Units 10/28/24  0502 10/27/24  1744 10/27/24  0424 10/26/24  0424 10/25/24  0817 10/25/24  0520 10/24/24  0802 10/23/24  0520 10/22/24  0506   WBC Thousand/uL 12.75*  --  11.66* 10.43*  --  9.44 13.01* 11.50* 6.26   HEMOGLOBIN g/dL 9.5*  --  9.7* 9.6*  --  8.5* 8.1* 7.9* 9.9*   HEMATOCRIT % 30.1*  --  31.2* 31.1*  --  29.9* 25.7* 25.2* 32.0*   PLATELETS Thousands/uL 256  --  223 214  --  160 175 131* 151   POTASSIUM mmol/L 5.1 4.3 5.5* 5.4* 4.8 6.1* 3.9 4.7 3.9   CHLORIDE mmol/L 111* 114* 110* 114*  --  118* 113* 110* 112*   CO2 mmol/L 22 24 23 23  --  23 26 22 22   BUN mg/dL 40* 35* 44* 44*  --  44* 49* 67* 60*   CREATININE mg/dL 1.17 1.27 1.29 1.32*  --  1.43* 1.57* 2.08* 2.31*   CALCIUM mg/dL 8.7 9.2 8.4 8.0*  --  7.9* 8.4 8.5 9.7   MAGNESIUM mg/dL 2.1  --  2.1  --   --  2.0  --  2.1 2.0   PHOSPHORUS mg/dL 3.1  --  2.6  --   --  3.2 2.0* 3.3 1.1*   ALBUMIN g/dL  --   --   --  3.5  --   --   --  3.1* 3.7       Administrative Statements   I have spent a total time of 15 minutes in caring for this patient on the day of the  "visit/encounter including Documenting in the medical record, Reviewing / ordering tests, medicine, procedures  , Obtaining or reviewing history  , and Communicating with other healthcare professionals .  Portions of the record may have been created with voice recognition software. Occasional wrong word or \"sound a like\" substitutions may have occurred due to the inherent limitations of voice recognition software. Read the chart carefully and recognize, using context, where substitutions have occurred.If you have any questions, please contact the dictating provider.  "

## 2024-10-28 NOTE — ASSESSMENT & PLAN NOTE
Estimated Creatinine Clearance: 31.5 mL/min (by C-G formula based on SCr of 1.17 mg/dL).   - Dose adjust antimicrobials / medications

## 2024-10-28 NOTE — TELEPHONE ENCOUNTER
Form faxed and scanned to chart    Folder (To be completed by) -  Dr. Fernando     Name of Form - [facility] Detailed Written Order   Blood Glucose/reagent strips    Color folder ([place])    Form to be Faxed (Fax #), [phone]    Patient was made aware of the 7-10 business day form policy.

## 2024-10-28 NOTE — ASSESSMENT & PLAN NOTE
-Likely secondary to hypercalcemia/volume depletion leading to prerenal/ATN etiology  - Baseline creatinine 0.8 mg/dL  - Admission creatinine 2.8 mg/dL  - Urinalysis without hematuria  - Renal imaging-no hydronephrosis  --Creatinine improved to 1.2 mg/dL

## 2024-10-28 NOTE — ASSESSMENT & PLAN NOTE
Lab Results   Component Value Date    HGBA1C 6.2 (H) 08/13/2024       Recent Labs     10/28/24  0024 10/28/24  0211 10/28/24  0612 10/28/24  1243   POCGLU 111 143* 196* 220*       Blood Sugar Average: Last 72 hrs:  (P) [id number]  Home regimen: regular insulin 4 units twice daily at 6PM and midnight  10/22 Glucose levels elevated overnight, requiring insulin gtt    Plan  Resumed PTA regimen of regular insulin 4 units twice daily (6PM and midnight)  Continue sliding scale Algorithm 1 q6h  Monitor blood glucose.   Goal -180 while admitted, adjusting insulin regimen as appropriate

## 2024-10-28 NOTE — ASSESSMENT & PLAN NOTE
10/21 Met SIRS criteria with fever, tachycardia  10/21 CXR - Faint inferior perihilar patchiness with subtle obscuration of the right cardiomediastinal silhouette could represent findings of aspiration in the appropriate clinical context.  Urinalysis - innumerable leukocytes and moderate bacteria on microscopy  Urine culture growing Morganella (susceptible to ceftriaxone)  Blood cultures negative to date  Elevated procalcitonin, bandemia  CT CAP - Multilobar pneumonia. No acute findings in the abdomen or pelvis. Prominent rectal vault stool without bowel obstruction.  CT temporal bones - no evidence of mastoiditis  Recurrent fevers. Worsening leukocytosis, bandemia. Mental status unchanged  10/28 CXR showed improved bilateral perihilar consolidations suggesting improved pneumonia    Plan  Infectious disease consulted, appreciate recommendations  Day 7/7 of IV Rocephin. Will hold further abx after  Follow-up RP2  Follow-up LE Dopplers  Follow-up Legionella antigen

## 2024-10-28 NOTE — ASSESSMENT & PLAN NOTE
Lab Results   Component Value Date    HGBA1C 6.2 (H) 08/13/2024     Recent Labs     10/28/24  0024 10/28/24  0211 10/28/24  0612 10/28/24  1243   POCGLU 111 143* 196* 220*   This is a risk factor for infection.  Well-controlled in this patient.  - Maintain euglycemia to improve WBC function

## 2024-10-28 NOTE — UTILIZATION REVIEW
Continued Stay Review    Date: 10/28/24                          Current Patient Class: Inpatient  Current Level of Care: Med Surg    HPI:64 y.o. female initially admitted on 10/18/24     10/28/24 Assessment/Plan: Pt continues w/ low grade fevers past 24hrs. No deficits on exam. Last BM yesterday. Blood cultures no growth. Abnormal labs: Bun 40, WBC 12.75. H/H 9.5/30.1. Urine culture Morganella but completed 7 day course of Ceftriaxone today. Repeat CXR, Check RP2 and urine Legionella antigen. Doppler LE to R/O DVT. Hold on further abx for now. Monitor CBC and Fever trend. Continue Protonix IV. Creat improving , monitor off IV fluids. Avoid Neprotoxins. CMP in am.     Certification Statement: The patient will continue to require additional inpatient hospital stay due to continued fevers despite antibiotic management     Medications:   Scheduled Medications:  ARIPiprazole, 2 mg, Per G Tube, HS  vitamin C, 1,000 mg, Per G Tube, Daily  baclofen, 10 mg, Per G Tube, TID  carbidopa-levodopa, 1 tablet, Oral, 4x Dailycitalopram, 20 mg, Per G Tube, Daily  Ferrous Sulfate, 300 mg, Per G Tube, Every Other Day  heparin (porcine), 5,000 Units, Subcutaneous, Q8H ROSEMARY  influenza vaccine, 0.5 mL, Intramuscular, Once  insulin lispro, 1-5 Units, Subcutaneous, Q6H ROSEMARY  insulin regular, 4 Units, Subcutaneous, BID  levothyroxine, 25 mcg, Per G Tube, Early Morning  LORazepam, 0.5 mg, Per G Tube, HS  oxybutynin, 5 mg, Per G Tube, TID  pantoprazole, 20 mg, Intravenous, Q24H ROSEMARY  polyethylene glycol, 17 g, Per G Tube, Daily  pravastatin, 40 mg, Per G Tube, Daily With Dinner  senna-docusate sodium, 1 tablet, Per G Tube, HS  valproic acid, 125 mg, Per G Tube, BID  ceftriaxone (ROCEPHIN) 1 g/50 mL in dextrose IVPB  Dose: 1,000 mg  Freq: Every 24 hours Route: IV  Last Dose: 1,000 mg (10/27/24 0667)  Start: 10/22/24 1500 End: 10/28/24 4965    Continuous IV Infusions:  sodium chloride 0.9 % infusion  Rate: 50 mL/hr Dose: 50 mL/hr  Freq: Continuous  Route: IV  Last Dose: Stopped (10/28/24 0755)  Start: 10/27/24 1230 End: 10/27/24 2358        PRN Meds:  acetaminophen, 650 mg, Per G Tube, Q6H PRN - given x1 10/28.  Ketotifen Fumarate, 1 drop, Both Eyes, BID PRN  midodrine, 5 mg, Per G Tube, TID PRN  traZODone, 100 mg, Per G Tube, HS PRN  acetaminophen (Ofirmev) IVPB 600 mg  Dose: 15 mg/kg  Weight Dosing Info: 40 kg  Freq: Every 6 hours PRN Route: IV  PRN Reasons: fever,mild pain,moderate pain  Start: 10/24/24 2033 End: 10/27/24 1017 - given x2 10/26, x1 10/27.     Discharge Plan: TBD    Vital Signs (last 3 days)       Date/Time Temp Pulse Resp BP MAP (mmHg) SpO2 O2 Device Patient Position - Orthostatic VS Black Coma Scale Score Pain    10/28/24 12:44:43 100.3 °F (37.9 °C) 96 -- -- -- 97 % -- -- -- --    10/28/24 0958 -- -- -- -- -- -- -- -- -- Med Not Given for Pain - for MAR use only    10/28/24 09:55:42 100.9 °F (38.3 °C) 96 -- -- -- 96 % -- -- -- --    10/28/24 0840 -- -- -- -- -- -- -- -- 9 --    10/28/24 07:43:35 100.5 °F (38.1 °C) 98 20 126/79 95 96 % -- -- -- --    10/27/24 22:45:31 99.1 °F (37.3 °C) 104 16 109/74 86 98 % -- -- -- --    10/27/24 2030 -- -- -- -- -- -- None (Room air) -- -- No Pain    10/27/24 16:12:41 -- 89 -- 135/81 99 98 % -- -- -- --    10/27/24 1420 99.1 °F (37.3 °C) -- -- -- -- -- -- -- -- --    10/27/24 1012 100.1 °F (37.8 °C) 103 -- -- -- -- -- -- -- --    10/27/24 0900 101.2 °F (38.4 °C) -- -- -- -- 96 % None (Room air) -- -- --    10/27/24 07:09:04 102.4 °F (39.1 °C) 112 18 140/87 105 95 % -- -- -- --    10/27/24 0655 100.6 °F (38.1 °C) -- -- -- -- -- -- -- -- --    10/27/24 0136 99.2 °F (37.3 °C) -- -- -- -- -- -- -- -- --    10/27/24 0100 -- -- -- -- -- -- None (Room air) -- -- --    10/26/24 22:49:20 100.7 °F (38.2 °C) 106 17 109/76 87 98 % -- -- -- --    10/26/24 1700 -- 96 -- -- -- 96 % None (Room air) -- -- --    10/26/24 1647 99.1 °F (37.3 °C) 85 -- -- -- 96 % -- -- -- --    10/26/24 15:03:33 101 °F (38.3 °C) 93 14 127/84  98 97 % None (Room air) Lying -- --    10/26/24 1100 -- -- -- -- -- 95 % None (Room air) -- -- --    10/26/24 0815 99.1 °F (37.3 °C) 91 18 131/84 100 94 % -- -- -- --    10/26/24 02:03:49 -- 91 -- 117/73 88 95 % -- -- -- --    10/26/24 0000 -- -- -- -- -- -- None (Room air) -- -- --    10/25/24 22:43:46 99.1 °F (37.3 °C) 81 18 95/72 80 99 % -- -- -- --    10/25/24 21:23:08 99.5 °F (37.5 °C) 78 -- 122/70 87 97 % -- -- -- --    10/25/24 17:26:56 99 °F (37.2 °C) 74 16 115/66 82 98 % -- -- -- --    10/25/24 15:03:26 98.9 °F (37.2 °C) 75 15 123/73 90 97 % -- -- -- --    10/25/24 1421 98.1 °F (36.7 °C) -- -- -- -- -- -- -- -- --    10/25/24 1319 -- -- -- -- -- -- None (Room air) -- -- --    10/25/24 09:26:18 100 °F (37.8 °C) 78 16 124/68 87 98 % -- -- -- --    10/25/24 04:10:45 98.9 °F (37.2 °C) 71 -- -- -- 98 % -- -- -- --    10/25/24 01:55:03 100.6 °F (38.1 °C) 91 -- -- -- 95 % -- -- -- --    10/25/24 00:11:15 101.2 °F (38.4 °C) 93 -- -- -- 98 % -- -- -- --          Weight (last 2 days)       None            Pertinent Labs/Diagnostic Results:   Radiology:  XR chest portable   Final Interpretation by Easton Lindsay MD (10/28 1402)      Improved bilateral patchy perihilar consolidations suggesting improving pneumonia.         Resident: Ezra Andrews I, the attending radiologist, have reviewed the images and agree with the final report above.      Workstation performed: [id number]         CT Temporal Bones/IACs/Mastoids WO Contrast   Final Interpretation by Pallav N Shah, MD (10/27 1434)      Unremarkable CT of the temporal bones other than incidental findings of age advanced cerebral and cerebellar atrophy.      No findings to suggest mastoiditis.            Workstation performed: [id number]         CT chest abdomen pelvis wo contrast   Final Interpretation by Kp Gayle MD (10/24 4190)      Multilobar pneumonia.      No acute findings in the abdomen or pelvis.      Prominent rectal vault  stool without bowel obstruction.      The study was marked in EPIC for immediate notification.               Workstation performed: [id number]         NM lung perfusion imaging (particulate)   Final Interpretation by Ezra Guevara MD (10/23 1536)      The probability for pulmonary embolus is low.      Workstation performed: [id number]         XR chest portable   Final Interpretation by Freddie Manuel MD (10/23 1132)      Perihilar opacities may be due to atelectasis versus pneumonia.            Workstation performed: [id number]         XR chest portable   Final Interpretation by Flynn Gonzalez MD (10/22 1047)      Faint inferior perihilar patchiness with subtle obscuration of the right cardiomediastinal silhouette could represent findings of aspiration in the appropriate clinical context.            Resident: Ezra Andrews I, the attending radiologist, have reviewed the images and agree with the final report above.      Workstation performed: [id number]         US kidney and bladder   Final Interpretation by Austin Nelson MD (10/19 1250)   Study technically limited.      Bilateral small echogenic kidneys of medical renal disease.   Possible debris in the bladder. Correlate with urinalysis.            Workstation performed: [id number]          VAS VENOUS DUPLEX - LOWER LIMB BILATERAL    (Results Pending)     Cardiology:  ECG 12 lead   Final Result by Matthieu Yeboah MD (10/21 1032)   Sinus rhythm   Normal ECG   When compared with ECG of 25-SEP-2024 22:25,   QT has shortened   Confirmed by Matthieu Yeboah ([id number]) on 10/21/2024 10:32:05 AM        GI:  No orders to display       Results from last 7 days   Lab Units 10/22/24  1552   SARS-COV-2  Negative     Results from last 7 days   Lab Units 10/28/24  0502 10/27/24  0424 10/26/24  0424 10/25/24  0520 10/24/24  0802   WBC Thousand/uL 12.75* 11.66* 10.43* 9.44 13.01*   HEMOGLOBIN g/dL 9.5* 9.7* 9.6* 8.5* 8.1*   HEMATOCRIT % 30.1* 31.2* 31.1*  29.9* 25.7*   PLATELETS Thousands/uL 256 223 214 160 175   TOTAL NEUT ABS Thousand/uL 7.52*  --   --   --   --    BANDS PCT % 10*  --   --  1 15*         Results from last 7 days   Lab Units 10/28/24  0502 10/27/24  1744 10/27/24  0424 10/26/24  0424 10/25/24  0817 10/25/24  0520 10/24/24  0802 10/23/24  0520 10/22/24  0506   SODIUM mmol/L 141 147 141 143  --  145 147 144 146   POTASSIUM mmol/L 5.1 4.3 5.5* 5.4* 4.8 6.1* 3.9 4.7 3.9   CHLORIDE mmol/L 111* 114* 110* 114*  --  118* 113* 110* 112*   CO2 mmol/L 22 24 23 23  --  23 26 22 22   ANION GAP mmol/L 8 9 8 6  --  4 8 12 12   BUN mg/dL 40* 35* 44* 44*  --  44* 49* 67* 60*   CREATININE mg/dL 1.17 1.27 1.29 1.32*  --  1.43* 1.57* 2.08* 2.31*   EGFR ml/min/1.73sq m 50 45 44 43  --  39 35 24 21   CALCIUM mg/dL 8.7 9.2 8.4 8.0*  --  7.9* 8.4 8.5 9.7   CALCIUM, IONIZED mmol/L 1.11*  --   --  1.10* 1.09*  --   --  1.08*  --    MAGNESIUM mg/dL 2.1  --  2.1  --   --  2.0  --  2.1 2.0   PHOSPHORUS mg/dL 3.1  --  2.6  --   --  3.2 2.0* 3.3 1.1*     Results from last 7 days   Lab Units 10/26/24  0424 10/23/24  0520 10/22/24  0902 10/22/24  0506   AST U/L 37  --   --   --    ALT U/L 14  --   --   --    ALK PHOS U/L 60  --   --   --    TOTAL PROTEIN g/dL 7.2  --  6.6  --    ALBUMIN g/dL 3.5 3.1*  --  3.7   TOTAL BILIRUBIN mg/dL 0.19*  --   --   --      Results from last 7 days   Lab Units 10/28/24  1243 10/28/24  0612 10/28/24  0211 10/28/24  0024 10/27/24  1743 10/27/24  1205 10/27/24  0509 10/26/24  2330 10/26/24  1738 10/26/24  1207 10/26/24  0554 10/25/24  2351   POC GLUCOSE mg/dl 220* 196* 143* 111 61* 271* 293* 246* 155* 260* 206* 180*     Results from last 7 days   Lab Units 10/28/24  0502 10/27/24  1744 10/27/24  0424 10/26/24  0424 10/25/24  0520 10/24/24  0802 10/23/24  0520 10/22/24  0506   GLUCOSE RANDOM mg/dL 180* 59* 263* 165* 99 73 323* 238*     Results from last 7 days   Lab Units 10/24/24  0632 10/24/24  0012 10/23/24  1409 10/22/24  2059 10/22/24  1209    PROTIME seconds  --   --   --   --  13.7   INR   --   --   --   --  0.98   PTT seconds 63* 60* 38*   < > 92*    < > = values in this interval not displayed.         Results from last 7 days   Lab Units 10/24/24  0802 10/22/24  0506   PROCALCITONIN ng/ml 3.26* 4.79*     Results from last 7 days   Lab Units 10/22/24  1046   CLARITY UA  Turbid   COLOR UA  Yellow   SPEC GRAV UA  1.013   PH UA  6.0   GLUCOSE UA mg/dl 500 (1/2%)*   KETONES UA mg/dl Negative   BLOOD UA  Moderate*   PROTEIN UA mg/dl 50 (1+)*   NITRITE UA  Negative   BILIRUBIN UA  Negative   UROBILINOGEN UA (BE) mg/dl <2.0   LEUKOCYTES UA  Large*   WBC UA /hpf Innumerable*   RBC UA /hpf 2-4*   BACTERIA UA /hpf Moderate*   EPITHELIAL CELLS WET PREP /hpf Occasional     Results from last 7 days   Lab Units 10/22/24  1552   INFLUENZA A PCR  Negative   INFLUENZA B PCR  Negative   RSV PCR  Negative       Results from last 7 days   Lab Units 10/22/24  1046 10/22/24  0902   BLOOD CULTURE   --  No Growth After 5 Days.  No Growth After 5 Days.   URINE CULTURE  >100,000 cfu/ml Morganella morganii*  --      Results from last 7 days   Lab Units 10/28/24  0502   TOTAL COUNTED  100               Network Utilization Review Department  ATTENTION: Please call with any questions or concerns to [phone] and carefully listen to the prompts so that you are directed to the right person. All voicemails are confidential.   For Discharge needs, contact Care Management DC Support Team at [phone] opt. [phone]  Send all requests for admission clinical reviews, approved or denied determinations and any other requests to dedicated fax number below belonging to the campus where the patient is receiving treatment. List of dedicated fax numbers for the Facilities:  FACILITY NAME UR FAX NUMBER   ADMISSION DENIALS (Administrative/Medical Necessity) [phone]   DISCHARGE SUPPORT TEAM (NETWORK) [phone]   PARENT CHILD HEALTH (Maternity/NICU/Pediatrics) [phone]   [facility]  [facility] [phone]   [facility] [phone]   [facility] [phone]   [facility] [phone]   [facility] [phone]   [facility] [phone]   [facility] [phone]   [facility] [phone]   [facility] [phone]   [facility] [phone]   [facility] [phone]   [facility] [phone]

## 2024-10-28 NOTE — ASSESSMENT & PLAN NOTE
Vs SIRS.  As evidenced by fever, tachycardia, and leukocytosis.  Blood cultures without growth.  UA with TNTC WBC, growing Morganella.  COVID/flu/RSV negative.  No LFT abnormalities.  Fortunately, she remains hemodynamically stable.  CTAP with multifocal pulmonary infiltrates, no abdominal pathology.  She completed 7 day course of ceftriaxone for pneumonia vs UTI (cx Morganella).    - Continued fevers:  repeat Chest XR, check RP2 and urine Legionella antigen, dopplers to r/o DVT  - Hold on further antibiotics for now  - Monitor CBC with differential

## 2024-10-29 ENCOUNTER — APPOINTMENT (INPATIENT)
Dept: CT IMAGING | Facility: HOSPITAL | Age: 64
DRG: 640 | End: 2024-10-29
Payer: MEDICARE

## 2024-10-29 PROBLEM — J18.9 PNEUMONIA: Status: RESOLVED | Noted: 2018-12-28 | Resolved: 2024-10-29

## 2024-10-29 LAB
ANION GAP SERPL CALCULATED.3IONS-SCNC: 8 MMOL/L (ref 4–13)
BACTERIA UR QL AUTO: ABNORMAL /HPF
BASOPHILS # BLD MANUAL: 0 THOUSAND/UL (ref 0–0.1)
BASOPHILS NFR MAR MANUAL: 0 % (ref 0–1)
BILIRUB UR QL STRIP: NEGATIVE
BUN SERPL-MCNC: 42 MG/DL (ref 5–25)
CALCIUM SERPL-MCNC: 8.6 MG/DL (ref 8.4–10.2)
CHLORIDE SERPL-SCNC: 104 MMOL/L (ref 96–108)
CLARITY UR: CLEAR
CO2 SERPL-SCNC: 25 MMOL/L (ref 21–32)
COLOR UR: ABNORMAL
CREAT SERPL-MCNC: 1.12 MG/DL (ref 0.6–1.3)
CRP SERPL QL: 7.4 MG/L
EOSINOPHIL # BLD MANUAL: 0.12 THOUSAND/UL (ref 0–0.4)
EOSINOPHIL NFR BLD MANUAL: 1 % (ref 0–6)
ERYTHROCYTE [DISTWIDTH] IN BLOOD BY AUTOMATED COUNT: 13.6 % (ref 11.6–15.1)
FERRITIN SERPL-MCNC: 447 NG/ML (ref 11–307)
GFR SERPL CREATININE-BSD FRML MDRD: 52 ML/MIN/1.73SQ M
GLUCOSE SERPL-MCNC: 173 MG/DL (ref 65–140)
GLUCOSE SERPL-MCNC: 204 MG/DL (ref 65–140)
GLUCOSE SERPL-MCNC: 205 MG/DL (ref 65–140)
GLUCOSE SERPL-MCNC: 220 MG/DL (ref 65–140)
GLUCOSE SERPL-MCNC: 269 MG/DL (ref 65–140)
GLUCOSE UR STRIP-MCNC: ABNORMAL MG/DL
HCT VFR BLD AUTO: 31.9 % (ref 34.8–46.1)
HGB BLD-MCNC: 10.2 G/DL (ref 11.5–15.4)
HGB UR QL STRIP.AUTO: NEGATIVE
KETONES UR STRIP-MCNC: NEGATIVE MG/DL
LEUKOCYTE ESTERASE UR QL STRIP: NEGATIVE
LYMPHOCYTES # BLD AUTO: 27 % (ref 14–44)
LYMPHOCYTES # BLD AUTO: 3.14 THOUSAND/UL (ref 0.6–4.47)
MCH RBC QN AUTO: 33.4 PG (ref 26.8–34.3)
MCHC RBC AUTO-ENTMCNC: 32 G/DL (ref 31.4–37.4)
MCV RBC AUTO: 105 FL (ref 82–98)
MONOCYTES # BLD AUTO: 0.58 THOUSAND/UL (ref 0–1.22)
MONOCYTES NFR BLD: 5 % (ref 4–12)
NEUTROPHILS # BLD MANUAL: 7.79 THOUSAND/UL (ref 1.85–7.62)
NEUTS BAND NFR BLD MANUAL: 8 % (ref 0–8)
NEUTS SEG NFR BLD AUTO: 59 % (ref 43–75)
NITRITE UR QL STRIP: NEGATIVE
NON-SQ EPI CELLS URNS QL MICRO: ABNORMAL /HPF
PH UR STRIP.AUTO: 6 [PH]
PLATELET # BLD AUTO: 244 THOUSANDS/UL (ref 149–390)
PLATELET BLD QL SMEAR: ADEQUATE
PMV BLD AUTO: 11.8 FL (ref 8.9–12.7)
POTASSIUM SERPL-SCNC: 4.9 MMOL/L (ref 3.5–5.3)
PROCALCITONIN SERPL-MCNC: 0.28 NG/ML
PROT UR STRIP-MCNC: ABNORMAL MG/DL
RBC # BLD AUTO: 3.05 MILLION/UL (ref 3.81–5.12)
RBC #/AREA URNS AUTO: ABNORMAL /HPF
RBC MORPH BLD: NORMAL
SODIUM SERPL-SCNC: 137 MMOL/L (ref 135–147)
SP GR UR STRIP.AUTO: 1.01 (ref 1–1.03)
UROBILINOGEN UR STRIP-ACNC: <2 MG/DL
WBC # BLD AUTO: 11.63 THOUSAND/UL (ref 4.31–10.16)
WBC #/AREA URNS AUTO: ABNORMAL /HPF

## 2024-10-29 PROCEDURE — 85007 BL SMEAR W/DIFF WBC COUNT: CPT

## 2024-10-29 PROCEDURE — 71260 CT THORAX DX C+: CPT

## 2024-10-29 PROCEDURE — 84145 PROCALCITONIN (PCT): CPT | Performed by: STUDENT IN AN ORGANIZED HEALTH CARE EDUCATION/TRAINING PROGRAM

## 2024-10-29 PROCEDURE — 82728 ASSAY OF FERRITIN: CPT | Performed by: INTERNAL MEDICINE

## 2024-10-29 PROCEDURE — 81001 URINALYSIS AUTO W/SCOPE: CPT | Performed by: INTERNAL MEDICINE

## 2024-10-29 PROCEDURE — 74177 CT ABD & PELVIS W/CONTRAST: CPT

## 2024-10-29 PROCEDURE — 99233 SBSQ HOSP IP/OBS HIGH 50: CPT | Performed by: INTERNAL MEDICINE

## 2024-10-29 PROCEDURE — 86140 C-REACTIVE PROTEIN: CPT | Performed by: INTERNAL MEDICINE

## 2024-10-29 PROCEDURE — 82948 REAGENT STRIP/BLOOD GLUCOSE: CPT

## 2024-10-29 PROCEDURE — 80048 BASIC METABOLIC PNL TOTAL CA: CPT

## 2024-10-29 PROCEDURE — 87040 BLOOD CULTURE FOR BACTERIA: CPT

## 2024-10-29 PROCEDURE — 99232 SBSQ HOSP IP/OBS MODERATE 35: CPT | Performed by: STUDENT IN AN ORGANIZED HEALTH CARE EDUCATION/TRAINING PROGRAM

## 2024-10-29 PROCEDURE — 85027 COMPLETE CBC AUTOMATED: CPT

## 2024-10-29 RX ORDER — ENOXAPARIN SODIUM 100 MG/ML
40 INJECTION SUBCUTANEOUS
Status: DISCONTINUED | OUTPATIENT
Start: 2024-10-29 | End: 2024-11-04 | Stop reason: HOSPADM

## 2024-10-29 RX ORDER — PANTOPRAZOLE SODIUM 20 MG/1
20 TABLET, DELAYED RELEASE ORAL
Status: DISCONTINUED | OUTPATIENT
Start: 2024-10-29 | End: 2024-11-04 | Stop reason: HOSPADM

## 2024-10-29 RX ORDER — SODIUM CHLORIDE 9 MG/ML
75 INJECTION, SOLUTION INTRAVENOUS CONTINUOUS
Status: DISPENSED | OUTPATIENT
Start: 2024-10-29 | End: 2024-10-30

## 2024-10-29 RX ORDER — SODIUM CHLORIDE 9 MG/ML
75 INJECTION, SOLUTION INTRAVENOUS CONTINUOUS
Status: DISPENSED | OUTPATIENT
Start: 2024-10-29 | End: 2024-10-29

## 2024-10-29 RX ADMIN — BACLOFEN 10 MG: 10 TABLET ORAL at 16:47

## 2024-10-29 RX ADMIN — PRAVASTATIN SODIUM 40 MG: 40 TABLET ORAL at 16:46

## 2024-10-29 RX ADMIN — CARBIDOPA AND LEVODOPA 1 TABLET: 25; 100 TABLET ORAL at 12:37

## 2024-10-29 RX ADMIN — INSULIN LISPRO 2 UNITS: 100 INJECTION, SOLUTION INTRAVENOUS; SUBCUTANEOUS at 12:33

## 2024-10-29 RX ADMIN — INSULIN HUMAN 4 UNITS: 100 INJECTION, SOLUTION PARENTERAL at 18:19

## 2024-10-29 RX ADMIN — BACLOFEN 10 MG: 10 TABLET ORAL at 09:36

## 2024-10-29 RX ADMIN — INSULIN LISPRO 1 UNITS: 100 INJECTION, SOLUTION INTRAVENOUS; SUBCUTANEOUS at 00:27

## 2024-10-29 RX ADMIN — SENNOSIDES AND DOCUSATE SODIUM 1 TABLET: 50; 8.6 TABLET ORAL at 21:55

## 2024-10-29 RX ADMIN — HEPARIN SODIUM 5000 UNITS: 5000 INJECTION INTRAVENOUS; SUBCUTANEOUS at 05:47

## 2024-10-29 RX ADMIN — INSULIN LISPRO 1 UNITS: 100 INJECTION, SOLUTION INTRAVENOUS; SUBCUTANEOUS at 06:19

## 2024-10-29 RX ADMIN — SODIUM CHLORIDE 75 ML/HR: 0.9 INJECTION, SOLUTION INTRAVENOUS at 13:58

## 2024-10-29 RX ADMIN — CARBIDOPA AND LEVODOPA 1 TABLET: 25; 100 TABLET ORAL at 09:35

## 2024-10-29 RX ADMIN — CITALOPRAM HYDROBROMIDE 20 MG: 20 TABLET, FILM COATED ORAL at 09:35

## 2024-10-29 RX ADMIN — IOHEXOL 80 ML: 350 INJECTION, SOLUTION INTRAVENOUS at 20:08

## 2024-10-29 RX ADMIN — OXYBUTYNIN CHLORIDE 5 MG: 5 TABLET ORAL at 16:46

## 2024-10-29 RX ADMIN — CARBIDOPA AND LEVODOPA 1 TABLET: 25; 100 TABLET ORAL at 18:19

## 2024-10-29 RX ADMIN — CARBIDOPA AND LEVODOPA 1 TABLET: 25; 100 TABLET ORAL at 21:55

## 2024-10-29 RX ADMIN — PANTOPRAZOLE SODIUM 20 MG: 40 INJECTION, POWDER, FOR SOLUTION INTRAVENOUS at 09:51

## 2024-10-29 RX ADMIN — VALPROIC ACID 125 MG: 250 SOLUTION ORAL at 09:35

## 2024-10-29 RX ADMIN — LEVOTHYROXINE SODIUM 25 MCG: 25 TABLET ORAL at 05:47

## 2024-10-29 RX ADMIN — POLYETHYLENE GLYCOL 3350 17 G: 17 POWDER, FOR SOLUTION ORAL at 09:34

## 2024-10-29 RX ADMIN — ENOXAPARIN SODIUM 40 MG: 40 INJECTION SUBCUTANEOUS at 12:37

## 2024-10-29 RX ADMIN — INSULIN LISPRO 1 UNITS: 100 INJECTION, SOLUTION INTRAVENOUS; SUBCUTANEOUS at 18:07

## 2024-10-29 RX ADMIN — ACETAMINOPHEN 650 MG: 325 TABLET ORAL at 05:50

## 2024-10-29 RX ADMIN — LORAZEPAM 0.5 MG: 0.5 TABLET ORAL at 21:54

## 2024-10-29 RX ADMIN — ARIPIPRAZOLE 2 MG: 2 TABLET ORAL at 22:00

## 2024-10-29 RX ADMIN — BACLOFEN 10 MG: 10 TABLET ORAL at 21:54

## 2024-10-29 RX ADMIN — OXYCODONE HYDROCHLORIDE AND ACETAMINOPHEN 1000 MG: 500 TABLET ORAL at 09:35

## 2024-10-29 RX ADMIN — ACETAMINOPHEN 650 MG: 325 TABLET ORAL at 00:31

## 2024-10-29 RX ADMIN — INSULIN HUMAN 4 UNITS: 100 INJECTION, SOLUTION PARENTERAL at 00:27

## 2024-10-29 RX ADMIN — OXYBUTYNIN CHLORIDE 5 MG: 5 TABLET ORAL at 21:54

## 2024-10-29 RX ADMIN — VALPROIC ACID 125 MG: 250 SOLUTION ORAL at 21:55

## 2024-10-29 RX ADMIN — OXYBUTYNIN CHLORIDE 5 MG: 5 TABLET ORAL at 09:35

## 2024-10-29 RX ADMIN — FERROUS SULFATE 300 MG: 300 SOLUTION ORAL at 09:35

## 2024-10-29 NOTE — PLAN OF CARE
Problem: Prexisting or High Potential for Compromised Skin Integrity  Goal: Skin integrity is maintained or improved  Description: INTERVENTIONS:  - Identify patients at risk for skin breakdown  - Assess and monitor skin integrity  - Assess and monitor nutrition and hydration status  - Monitor labs   - Assess for incontinence   - Turn and reposition patient  - Assist with mobility/ambulation  - Relieve pressure over bony prominences  - Avoid friction and shearing  - Provide appropriate hygiene as needed including keeping skin clean and dry  - Evaluate need for skin moisturizer/barrier cream  - Collaborate with interdisciplinary team   - Patient/family teaching  - Consider wound care consult   Outcome: Progressing     Problem: PAIN - ADULT  Goal: Verbalizes/displays adequate comfort level or baseline comfort level  Description: Interventions:  - Encourage patient to monitor pain and request assistance  - Assess pain using appropriate pain scale  - Administer analgesics based on type and severity of pain and evaluate response  - Implement non-pharmacological measures as appropriate and evaluate response  - Consider cultural and social influences on pain and pain management  - Notify physician/advanced practitioner if interventions unsuccessful or patient reports new pain  Outcome: Progressing     Problem: INFECTION - ADULT  Goal: Absence or prevention of progression during hospitalization  Description: INTERVENTIONS:  - Assess and monitor for signs and symptoms of infection  - Monitor lab/diagnostic results  - Monitor all insertion sites, i.e. indwelling lines, tubes, and drains  - Monitor endotracheal if appropriate and nasal secretions for changes in amount and color  - [place] appropriate cooling/warming therapies per order  - Administer medications as ordered  - Instruct and encourage patient and family to use good hand hygiene technique  - Identify and instruct in appropriate isolation precautions for  identified infection/condition  Outcome: Progressing  Goal: Absence of fever/infection during neutropenic period  Description: INTERVENTIONS:  - Monitor WBC    Outcome: Progressing     Problem: SAFETY ADULT  Goal: Patient will remain free of falls  Description: INTERVENTIONS:  - Educate patient/family on patient safety including physical limitations  - Instruct patient to call for assistance with activity   - Consult OT/PT to assist with strengthening/mobility   - Keep Call bell within reach  - Keep bed low and locked with side rails adjusted as appropriate  - Keep care items and personal belongings within reach  - Initiate and maintain comfort rounds  - Make Fall Risk Sign visible to staff  - Offer Toileting every  Hours, in advance of need  - Initiate/Maintain alarm  - Obtain necessary fall risk management equipment:   - Apply yellow socks and bracelet for high fall risk patients  - Consider moving patient to room near nurses station  Outcome: Progressing  Goal: Maintain or return to baseline ADL function  Description: INTERVENTIONS:  -  Assess patient's ability to carry out ADLs; assess patient's baseline for ADL function and identify physical deficits which impact ability to perform ADLs (bathing, care of mouth/teeth, toileting, grooming, dressing, etc.)  - Assess/evaluate cause of self-care deficits   - Assess range of motion  - Assess patient's mobility; develop plan if impaired  - Assess patient's need for assistive devices and provide as appropriate  - Encourage maximum independence but intervene and supervise when necessary  - Involve family in performance of ADLs  - Assess for home care needs following discharge   - Consider OT consult to assist with ADL evaluation and planning for discharge  - Provide patient education as appropriate  Outcome: Progressing  Goal: Maintains/Returns to pre admission functional level  Description: INTERVENTIONS:  - Perform AM-PAC 6 Click Basic Mobility/ Daily Activity  assessment daily.  - Set and communicate daily mobility goal to care team and patient/family/caregiver.   - Collaborate with rehabilitation services on mobility goals if consulted  - Perform Range of Motion  times a day.  - Reposition patient every  hours.  - Dangle patient  times a day  - Stand patient  times a day  - Ambulate patient  times a day  - Out of bed to chair  times a day   - Out of bed for meals  times a day  - Out of bed for toileting  - Record patient progress and toleration of activity level   Outcome: Progressing     Problem: DISCHARGE PLANNING  Goal: Discharge to home or other facility with appropriate resources  Description: INTERVENTIONS:  - Identify barriers to discharge w/patient and caregiver  - Arrange for needed discharge resources and transportation as appropriate  - Identify discharge learning needs (meds, wound care, etc.)  - Arrange for interpretive services to assist at discharge as needed  - Refer to Case Management Department for coordinating discharge planning if the patient needs post-hospital services based on physician/advanced practitioner order or complex needs related to functional status, cognitive ability, or social support system  Outcome: Progressing     Problem: Knowledge Deficit  Goal: Patient/family/caregiver demonstrates understanding of disease process, treatment plan, medications, and discharge instructions  Description: Complete learning assessment and assess knowledge base.  Interventions:  - Provide teaching at level of understanding  - Provide teaching via preferred learning methods  Outcome: Progressing     Problem: Nutrition/Hydration-ADULT  Goal: Nutrient/Hydration intake appropriate for improving, restoring or maintaining nutritional needs  Description: Monitor and assess patient's nutrition/hydration status for malnutrition. Collaborate with interdisciplinary team and initiate plan and interventions as ordered.  Monitor patient's weight and dietary intake as  ordered or per policy. Utilize nutrition screening tool and intervene as necessary. Determine patient's food preferences and provide high-protein, high-caloric foods as appropriate.     INTERVENTIONS:  - Monitor oral intake, urinary output, labs, and treatment plans  - Assess nutrition and hydration status and recommend course of action  - Evaluate amount of meals eaten  - Assist patient with eating if necessary   - Allow adequate time for meals  - Recommend/ encourage appropriate diets, oral nutritional supplements, and vitamin/mineral supplements  - Order, calculate, and assess calorie counts as needed  - Recommend, monitor, and adjust tube feedings and TPN/PPN based on assessed needs  - Assess need for intravenous fluids  - Provide specific nutrition/hydration education as appropriate  - Include patient/family/caregiver in decisions related to nutrition  Outcome: Progressing     Problem: Potential for Falls  Goal: Patient will remain free of falls  Description: INTERVENTIONS:  - Educate patient/family on patient safety including physical limitations  - Instruct patient to call for assistance with activity   - Consult OT/PT to assist with strengthening/mobility   - Keep Call bell within reach  - Keep bed low and locked with side rails adjusted as appropriate  - Keep care items and personal belongings within reach  - Initiate and maintain comfort rounds  - Make Fall Risk Sign visible to staff  - Offer Toileting every  Hours, in advance of need  - Initiate/Maintain alarm  - Obtain necessary fall risk management equipment:   - Apply yellow socks and bracelet for high fall risk patients  - Consider moving patient to room near nurses station  Outcome: Progressing

## 2024-10-29 NOTE — PROGRESS NOTES
Progress Note - Infectious Disease   Name: Terrie Alicea 64 y.o. female I MRN: [id number]  Unit/Bed#: S -01 I Date of Admission: 10/18/2024   Date of Service: 10/29/2024 I Hospital Day: 11    Assessment & Plan  Fever of unknown origin  Patient persistently febrile since ~hospital day 3.  Blood cultures negative.  UA with TNTC WBC, Morganella on culture.  COVID/flu/RSV and Rp2 panel negative.  No LFT abnormalities.  CTAP with multifocal pulmonary infiltrates, no abdominal pathology and PEG site clean.  She completed 7 day course of ceftriaxone for pneumonia vs UTI (cx Morganella).   Follow up chest XR showed improved infiltrates 10/28.  Full skin exam unremarkable.  Difficult to assess oropharynx - she has some whitish plaques on her tongue but may be from dry mouth.  CT of sinuses did not show pathology.  4 extremity dopplers negative for DVT and lung VQ scan low probability of PE.  Resides in a care facility, so shouldn't have any unusual exposures.  Urine Legionella negative.  Normal bowel movements.  Tolerating tube feeds.  She is receiving all home medications, aside from heparin now.  Procalcitonin down-trended.  CBC shows persistent percentage of bands but stable WBC count, Hgb, and platelets.  Thyroid studies were normal earlier in admission.    Fortunately, she remains hemodynamically stable, now off antibiotics.  - Let's continue to monitor off antibiotics  - Trend CBC with differential  - Would stop heparin to assess for drug fever, stop IV PPI  - Check repeat blood cultures, UA, ferritin, ESR/CRP  - Consider CT chest/AP with contrast (if sufficient renal recovery)  UTI (urinary tract infection)  Urine culture growing Morganella, TNTC WBC.  No indwelling catheter.  - Completed ceftriaxone course  Cerebral palsy (HCC)  Continue home medications  Bipolar disorder (HCC)  Continue home medications  Diabetes mellitus type 2, insulin dependent ([facility])  Lab Results   Component Value Date    HGBA1C 6.2 (H)  2024     Recent Labs     10/28/24  1243 10/28/24  1805 10/29/24  0004 10/29/24  0616   POCGLU 220* 161* 205* 220*   This is a risk factor for infection.  Well-controlled in this patient.  - Maintain euglycemia to improve WBC function    Parkinson's disease (HCC)  Continue home medication.      I have discussed the above management plan in detail with the primary service, Dr Ricardo, who agrees with additional workup above for fever and monitoring off antibiotics.  We will continue to follow along with you.    I have performed an extensive review of the medical records in Epic including review of the notes, radiographs, and laboratory results     Antibiotics:  Off antibiotics day 1    24 Hour Events:  Continued low-grade fevers    Subjective:  Patient non-verbal.  Saw with bedside RN.  Complete skin exam without wounds, rashes, lesions.  Reporting normal once daily bowel movements.      Objective:  Vitals:  Temp:  [98.3 °F (36.8 °C)-100.6 °F (38.1 °C)] 100.6 °F (38.1 °C)  HR:  [] 86  Resp:  [16] 16  BP: (103-120)/(68-72) 120/72  SpO2:  [96 %-100 %] 96 %  Temp (24hrs), Av.7 °F (37.6 °C), Min:98.3 °F (36.8 °C), Max:100.6 °F (38.1 °C)  Current: Temperature: (!) 100.6 °F (38.1 °C)      Physical Exam:   General: Appears chronically ill, in no apparent distress  HEENT:  Normocephalic, sclera anicteric, MMM with whitish plaques on tongue - pt would not cooperate with exam to better visualize  Heart:  RRR, no murmurs, rubs, or gallops  Lungs:  Clear to auscultation bilaterally  Abdomen:  Soft, nontender, nondistended, normal bowel sounds  :  No suprapubic tenderness  Extremities:  No cyanosis or edema  Skin:  No rashes, lesions on complete skin exam; PEG site clean  Neuro:  Awake, alert, tracking to voice      Labs:   All pertinent labs and imaging studies were personally reviewed  Results from last 7 days   Lab Units 10/29/24  0516 10/28/24  0502 10/27/24  0424   WBC Thousand/uL 11.63* 12.75* 11.66*    HEMOGLOBIN g/dL 10.2* 9.5* 9.7*   PLATELETS Thousands/uL 244 256 223     Results from last 7 days   Lab Units 10/29/24  0516 10/28/24  0502 10/27/24  1744 10/27/24  0424 10/26/24  0424   SODIUM mmol/L 137 141 147   < > 143   POTASSIUM mmol/L 4.9 5.1 4.3   < > 5.4*   CHLORIDE mmol/L 104 111* 114*   < > 114*   CO2 mmol/L 25 22 24   < > 23   BUN mg/dL 42* 40* 35*   < > 44*   CREATININE mg/dL 1.12 1.17 1.27   < > 1.32*   EGFR ml/min/1.73sq m 52 50 45   < > 43   CALCIUM mg/dL 8.6 8.7 9.2   < > 8.0*   AST U/L  --   --   --   --  37   ALT U/L  --   --   --   --  14   ALK PHOS U/L  --   --   --   --  60    < > = values in this interval not displayed.     Results from last 7 days   Lab Units 10/29/24  0516 10/24/24  0802   PROCALCITONIN ng/ml 0.28* 3.26*                     Microbiology:  Results from last 7 days   Lab Units 10/28/24  1432   LEGIONELLA URINARY ANTIGEN  Negative       Imaging:        Leander Dodd MD  [facility]

## 2024-10-29 NOTE — ASSESSMENT & PLAN NOTE
Lab Results   Component Value Date    HGBA1C 6.2 (H) 08/13/2024       Recent Labs     10/28/24  1243 10/28/24  1805 10/29/24  0004 10/29/24  0616   POCGLU 220* 161* 205* 220*       Blood Sugar Average: Last 72 hrs:  (P) 196.0708491589838747  Home regimen: regular insulin 4 units twice daily at 6PM and midnight  10/22 Glucose levels elevated overnight, requiring insulin gtt    Plan  Resumed PTA regimen of regular insulin 4 units twice daily (6PM and midnight)  Continue sliding scale Algorithm 1 q6h  Monitor blood glucose.   Goal -180 while admitted, adjusting insulin regimen as appropriate

## 2024-10-29 NOTE — ASSESSMENT & PLAN NOTE
10/21 Met SIRS criteria with fever, tachycardia  10/21 CXR - Faint inferior perihilar patchiness with subtle obscuration of the right cardiomediastinal silhouette could represent findings of aspiration in the appropriate clinical context.  Urinalysis - innumerable leukocytes and moderate bacteria on microscopy  Urine culture growing Morganella (susceptible to ceftriaxone)  10/22 Blood cultures negative to date  Elevated procalcitonin, bandemia  CT CAP - Multilobar pneumonia. No acute findings in the abdomen or pelvis. Prominent rectal vault stool without bowel obstruction.  CT temporal bones - no evidence of mastoiditis  Recurrent fevers. Worsening leukocytosis, bandemia. Mental status unchanged  10/28 CXR showed improved bilateral perihilar consolidations suggesting improved pneumonia  RP2 negative  Legionella urine antigen negative  LE Dopplers show no evidence of acute/chronic DVT  Continues to have fevers. Leukocytosis mildly improved. Mental status at baseline.   Fever etiology unclear at this time. Considering infectious vs. Drug-induced vs. Malignancy (though less likely at this time as work-up so far has been unremarkable)    Plan  Infectious disease on board, appreciate recommendations  Completed 7-day course of Ceftriaxone on 10/28  Repeat blood cultures  Switch IV Pantoprazole to oral pantoprazole  Switched DVT ppx from heparin to lovenox  Ordered CTA CAP w/contrast  Monitor temp  Trend WBCs

## 2024-10-29 NOTE — ASSESSMENT & PLAN NOTE
Patient persistently febrile since ~hospital day 3.  Blood cultures negative.  UA with TNTC WBC, Morganella on culture.  COVID/flu/RSV and Rp2 panel negative.  No LFT abnormalities.  CTAP with multifocal pulmonary infiltrates, no abdominal pathology and PEG site clean.  She completed 7 day course of ceftriaxone for pneumonia vs UTI (cx Morganella).   Follow up chest XR showed improved infiltrates 10/28.  Full skin exam unremarkable.  Difficult to assess oropharynx - she has some whitish plaques on her tongue but may be from dry mouth.  CT of sinuses did not show pathology.  4 extremity dopplers negative for DVT and lung VQ scan low probability of PE.  Resides in a care facility, so shouldn't have any unusual exposures.  Urine Legionella negative.  Normal bowel movements.  Tolerating tube feeds.  She is receiving all home medications, aside from heparin now.  Procalcitonin down-trended.  CBC shows persistent percentage of bands but stable WBC count, Hgb, and platelets.  Thyroid studies were normal earlier in admission.    Fortunately, she remains hemodynamically stable, now off antibiotics.  - Let's continue to monitor off antibiotics  - Trend CBC with differential  - Would stop heparin to assess for drug fever, stop IV PPI  - Check repeat blood cultures, UA, ferritin, ESR/CRP  - Consider CT chest/AP with contrast (if sufficient renal recovery)

## 2024-10-29 NOTE — ASSESSMENT & PLAN NOTE
Lab Results   Component Value Date    HGBA1C 6.2 (H) 08/13/2024     Recent Labs     10/28/24  1243 10/28/24  1805 10/29/24  0004 10/29/24  0616   POCGLU 220* 161* 205* 220*   This is a risk factor for infection.  Well-controlled in this patient.  - Maintain euglycemia to improve WBC function

## 2024-10-29 NOTE — PROGRESS NOTES
Progress Note - Hospitalist   Name: Terrie Alicea 64 y.o. female I MRN: [id number]  Unit/Bed#: S -01 I Date of Admission: 10/18/2024   Date of Service: 10/29/2024 I Hospital Day: 11    Assessment & Plan  Sepsis (HCC)  10/21 Met SIRS criteria with fever, tachycardia  10/21 CXR - Faint inferior perihilar patchiness with subtle obscuration of the right cardiomediastinal silhouette could represent findings of aspiration in the appropriate clinical context.  Urinalysis - innumerable leukocytes and moderate bacteria on microscopy  Urine culture growing Morganella (susceptible to ceftriaxone)  10/22 Blood cultures negative to date  Elevated procalcitonin, bandemia  CT CAP - Multilobar pneumonia. No acute findings in the abdomen or pelvis. Prominent rectal vault stool without bowel obstruction.  CT temporal bones - no evidence of mastoiditis  Recurrent fevers. Worsening leukocytosis, bandemia. Mental status unchanged  10/28 CXR showed improved bilateral perihilar consolidations suggesting improved pneumonia  RP2 negative  Legionella urine antigen negative  LE Dopplers show no evidence of acute/chronic DVT  Continues to have fevers. Leukocytosis mildly improved. Mental status at baseline.   Fever etiology unclear at this time. Considering infectious vs. Drug-induced vs. Malignancy (though less likely at this time as work-up so far has been unremarkable)    Plan  Infectious disease on board, appreciate recommendations  Completed 7-day course of Ceftriaxone on 10/28  Repeat blood cultures  Switch IV Pantoprazole to oral pantoprazole  Switched DVT ppx from heparin to lovenox  Ordered CTA CAP w/contrast  Monitor temp  Trend WBCs    Pneumonia  See plan for sepsis  UTI (urinary tract infection)  See plan for sepsis  Diabetes mellitus type 2, insulin dependent (HCC)  Lab Results   Component Value Date    HGBA1C 6.2 (H) 08/13/2024       Recent Labs     10/28/24  1243 10/28/24  1805 10/29/24  0004 10/29/24  0616   POCGLU 220*  161* 205* 220*       Blood Sugar Average: Last 72 hrs:  (P) [id number]  Home regimen: regular insulin 4 units twice daily at 6PM and midnight  10/22 Glucose levels elevated overnight, requiring insulin gtt    Plan  Resumed PTA regimen of regular insulin 4 units twice daily (6PM and midnight)  Continue sliding scale Algorithm 1 q6h  Monitor blood glucose.   Goal -180 while admitted, adjusting insulin regimen as appropriate  Cerebral palsy (HCC)  At baseline is non verbal , totally dependent on care, resides in intermediate care facility  Continue PTA baclofen TID  Bipolar disorder (HCC)  Continue PTA Depakene and Abilify  Iron deficiency anemia  Recent Labs     10/27/24  0424 10/28/24  0502 10/29/24  0516   HGB 9.7* 9.5* 10.2*   Baseline: 9-11, currently hemoglobin is baseline  Ordered iron studies  Daily CBC  Continue PTA ferrous sulfate  Transfuse if hemoglobin less than 7  Hypothyroidism  Continue PTA levothyroxine  TSH and T4 within normal limits  Parkinson's disease (HCC)  On Sinemet CR  mg 2 tablets BID at home  Switched to Sinemet immediate release  mg 1 tablet 4 times daily while admitted    VTE Pharmacologic Prophylaxis: VTE Score: 4 Moderate Risk (Score 3-4) - Pharmacological DVT Prophylaxis Ordered: heparin.    Mobility:   Basic Mobility Inpatient Raw Score: 6  JH-HLM Goal: 2: Bed activities/Dependent transfer  JH-HLM Achieved: 2: Bed activities/Dependent transfer  JH-HLM Goal achieved. Continue to encourage appropriate mobility.    Patient Centered Rounds: I performed bedside rounds with nursing staff today.   Discussions with Specialists or Other Care Team Provider: ID, Nephrology    Education and Discussions with Family / Patient: Updated  (caregiver) via phone.    Current Length of Stay: 11 day(s)  Current Patient Status: Inpatient   Certification Statement: The patient will continue to require additional inpatient hospital stay due to hyperthermia  Discharge  Plan: Anticipate discharge in 48-72 hrs to prior assisted or independent living facility.    Code Status: Level 1 - Full Code    Subjective   Patient seen and evaluated at bedside. She had no acute events overnight. Continues to have fevers. Vitals otherwise stable. She does appear to be in acute distress. Alert, mental status at baseline.    Objective :  Temp:  [98.3 °F (36.8 °C)-100.6 °F (38.1 °C)] 100.6 °F (38.1 °C)  HR:  [] 86  BP: (103-120)/(68-72) 120/72  Resp:  [16] 16  SpO2:  [96 %-100 %] 96 %  O2 Device: None (Room air)    Body mass index is 17.81 kg/m².     Input and Output Summary (last 24 hours):     Intake/Output Summary (Last 24 hours) at 10/29/2024 1029  Last data filed at 10/29/2024 0620  Gross per 24 hour   Intake 250 ml   Output 762 ml   Net -512 ml       Physical Exam  Vitals reviewed.   Constitutional:       General: She is not in acute distress.     Appearance: She is well-developed.   HENT:      Head: Normocephalic and atraumatic.   Eyes:      Conjunctiva/sclera: Conjunctivae normal.   Cardiovascular:      Rate and Rhythm: Normal rate and regular rhythm.      Pulses: Normal pulses.      Heart sounds: Normal heart sounds. No murmur heard.  Pulmonary:      Effort: Pulmonary effort is normal. No respiratory distress.      Breath sounds: Normal breath sounds.   Abdominal:      General: There is no distension.      Palpations: Abdomen is soft.      Tenderness: There is no abdominal tenderness.   Musculoskeletal:         General: No swelling or tenderness.   Skin:     General: Skin is warm and dry.      Capillary Refill: Capillary refill takes less than 2 seconds.   Neurological:      Mental Status: She is alert. Mental status is at baseline.   Psychiatric:         Mood and Affect: Mood normal.         Lines/Drains:          Lab Results: I have reviewed the following results:   Results from last 7 days   Lab Units 10/29/24  0516   WBC Thousand/uL 11.63*   HEMOGLOBIN g/dL 10.2*   HEMATOCRIT %  31.9*   PLATELETS Thousands/uL 244   BANDS PCT % 8   LYMPHO PCT % 27   MONO PCT % 5   EOS PCT % 1     Results from last 7 days   Lab Units 10/29/24  0516 10/27/24  0424 10/26/24  0424   SODIUM mmol/L 137   < > 143   POTASSIUM mmol/L 4.9   < > 5.4*   CHLORIDE mmol/L 104   < > 114*   CO2 mmol/L 25   < > 23   BUN mg/dL 42*   < > 44*   CREATININE mg/dL 1.12   < > 1.32*   ANION GAP mmol/L 8   < > 6   CALCIUM mg/dL 8.6   < > 8.0*   ALBUMIN g/dL  --   --  3.5   TOTAL BILIRUBIN mg/dL  --   --  0.19*   ALK PHOS U/L  --   --  60   ALT U/L  --   --  14   AST U/L  --   --  37   GLUCOSE RANDOM mg/dL 204*   < > 165*    < > = values in this interval not displayed.     Results from last 7 days   Lab Units 10/22/24  1209   INR  0.98     Results from last 7 days   Lab Units 10/29/24  0616 10/29/24  0004 10/28/24  1805 10/28/24  1243 10/28/24  0612 10/28/24  0211 10/28/24  0024 10/27/24  1743 10/27/24  1205 10/27/24  0509 10/26/24  2330 10/26/24  1738   POC GLUCOSE mg/dl 220* 205* 161* 220* 196* 143* 111 61* 271* 293* 246* 155*         Results from last 7 days   Lab Units 10/29/24  0516 10/24/24  0802   PROCALCITONIN ng/ml 0.28* 3.26*       Recent Cultures (last 7 days):   Results from last 7 days   Lab Units 10/28/24  1432 10/22/24  1046   URINE CULTURE   --  >100,000 cfu/ml Morganella morganii*   LEGIONELLA URINARY ANTIGEN  Negative  --              Last 24 Hours Medication List:     Current Facility-Administered Medications:     acetaminophen (TYLENOL) tablet 650 mg, Q6H PRN    ARIPiprazole (ABILIFY) tablet 2 mg, HS    ascorbic acid (VITAMIN C) tablet 1,000 mg, Daily    baclofen tablet 10 mg, TID    carbidopa-levodopa (SINEMET)  mg per tablet 1 tablet, 4x Daily    citalopram (CeleXA) tablet 20 mg, Daily    Ferrous Sulfate oral syrup 300 mg, Every Other Day    heparin (porcine) subcutaneous injection 5,000 Units, Q8H ROSEMARY    influenza vaccine, recombinant (PF) (Flublok) IM injection 0.5 mL, Once    insulin lispro  (HumALOG/ADMELOG) 100 units/mL subcutaneous injection 1-5 Units, Q6H ROSEMARY **AND** Fingerstick Glucose (POCT), Q6H    insulin regular (HumuLIN R,NovoLIN R) injection 4 Units, BID    Ketotifen Fumarate (ZADITOR) 0.035 % ophthalmic solution 1 drop, BID PRN    levothyroxine tablet 25 mcg, Early Morning    LORazepam (ATIVAN) tablet 0.5 mg, HS    midodrine (PROAMATINE) tablet 5 mg, TID PRN    oxybutynin (DITROPAN) tablet 5 mg, TID    pantoprazole (PROTONIX) EC tablet 20 mg, Early Morning    polyethylene glycol (MIRALAX) packet 17 g, Daily    pravastatin (PRAVACHOL) tablet 40 mg, Daily With Dinner    senna-docusate sodium (SENOKOT S) 8.6-50 mg per tablet 1 tablet, HS    traZODone (DESYREL) tablet 100 mg, HS PRN    valproic acid (DEPAKENE) oral soln 125 mg, BID    Administrative Statements   Today, Patient Was Seen By: Melonie Coleman MD      **Please Note: This note may have been constructed using a voice recognition system.**

## 2024-10-29 NOTE — ASSESSMENT & PLAN NOTE
Recent Labs     10/27/24  0424 10/28/24  0502 10/29/24  0516   HGB 9.7* 9.5* 10.2*   Baseline: 9-11, currently hemoglobin is baseline  Ordered iron studies  Daily CBC  Continue PTA ferrous sulfate  Transfuse if hemoglobin less than 7

## 2024-10-30 ENCOUNTER — APPOINTMENT (INPATIENT)
Dept: VASCULAR ULTRASOUND | Facility: HOSPITAL | Age: 64
DRG: 640 | End: 2024-10-30
Payer: MEDICARE

## 2024-10-30 LAB
ALBUMIN SERPL BCG-MCNC: 3.4 G/DL (ref 3.5–5)
ALP SERPL-CCNC: 43 U/L (ref 34–104)
ALT SERPL W P-5'-P-CCNC: 18 U/L (ref 7–52)
ANION GAP SERPL CALCULATED.3IONS-SCNC: 4 MMOL/L (ref 4–13)
AST SERPL W P-5'-P-CCNC: 36 U/L (ref 13–39)
BASOPHILS # BLD AUTO: 0.06 THOUSANDS/ΜL (ref 0–0.1)
BASOPHILS NFR BLD AUTO: 1 % (ref 0–1)
BILIRUB DIRECT SERPL-MCNC: 0.02 MG/DL (ref 0–0.2)
BILIRUB SERPL-MCNC: 0.34 MG/DL (ref 0.2–1)
BUN SERPL-MCNC: 27 MG/DL (ref 5–25)
CALCIUM SERPL-MCNC: 8.3 MG/DL (ref 8.4–10.2)
CHLORIDE SERPL-SCNC: 101 MMOL/L (ref 96–108)
CK SERPL-CCNC: 45 U/L (ref 26–192)
CO2 SERPL-SCNC: 25 MMOL/L (ref 21–32)
CORTIS AM PEAK SERPL-MCNC: 12.4 UG/DL (ref 6.7–22.6)
CREAT SERPL-MCNC: 1.01 MG/DL (ref 0.6–1.3)
EOSINOPHIL # BLD AUTO: 0.24 THOUSAND/ΜL (ref 0–0.61)
EOSINOPHIL NFR BLD AUTO: 2 % (ref 0–6)
ERYTHROCYTE [DISTWIDTH] IN BLOOD BY AUTOMATED COUNT: 13.5 % (ref 11.6–15.1)
ERYTHROCYTE [SEDIMENTATION RATE] IN BLOOD: 59 MM/HOUR (ref 0–29)
GFR SERPL CREATININE-BSD FRML MDRD: 59 ML/MIN/1.73SQ M
GLUCOSE SERPL-MCNC: 228 MG/DL (ref 65–140)
GLUCOSE SERPL-MCNC: 265 MG/DL (ref 65–140)
GLUCOSE SERPL-MCNC: 278 MG/DL (ref 65–140)
GLUCOSE SERPL-MCNC: 318 MG/DL (ref 65–140)
GLUCOSE SERPL-MCNC: 366 MG/DL (ref 65–140)
HCT VFR BLD AUTO: 27.4 % (ref 34.8–46.1)
HGB BLD-MCNC: 8.8 G/DL (ref 11.5–15.4)
IMM GRANULOCYTES # BLD AUTO: 0.22 THOUSAND/UL (ref 0–0.2)
IMM GRANULOCYTES NFR BLD AUTO: 2 % (ref 0–2)
LYMPHOCYTES # BLD AUTO: 1.78 THOUSANDS/ΜL (ref 0.6–4.47)
LYMPHOCYTES NFR BLD AUTO: 17 % (ref 14–44)
MCH RBC QN AUTO: 33 PG (ref 26.8–34.3)
MCHC RBC AUTO-ENTMCNC: 32.1 G/DL (ref 31.4–37.4)
MCV RBC AUTO: 103 FL (ref 82–98)
MONOCYTES # BLD AUTO: 0.56 THOUSAND/ΜL (ref 0.17–1.22)
MONOCYTES NFR BLD AUTO: 5 % (ref 4–12)
NEUTROPHILS # BLD AUTO: 7.52 THOUSANDS/ΜL (ref 1.85–7.62)
NEUTS SEG NFR BLD AUTO: 73 % (ref 43–75)
NRBC BLD AUTO-RTO: 0 /100 WBCS
PLATELET # BLD AUTO: 264 THOUSANDS/UL (ref 149–390)
PMV BLD AUTO: 11.9 FL (ref 8.9–12.7)
POTASSIUM SERPL-SCNC: 5.1 MMOL/L (ref 3.5–5.3)
POTASSIUM SERPL-SCNC: 5.8 MMOL/L (ref 3.5–5.3)
PROT SERPL-MCNC: 6.9 G/DL (ref 6.4–8.4)
RBC # BLD AUTO: 2.67 MILLION/UL (ref 3.81–5.12)
SODIUM SERPL-SCNC: 130 MMOL/L (ref 135–147)
WBC # BLD AUTO: 10.38 THOUSAND/UL (ref 4.31–10.16)

## 2024-10-30 PROCEDURE — 82533 TOTAL CORTISOL: CPT | Performed by: INTERNAL MEDICINE

## 2024-10-30 PROCEDURE — 85652 RBC SED RATE AUTOMATED: CPT | Performed by: STUDENT IN AN ORGANIZED HEALTH CARE EDUCATION/TRAINING PROGRAM

## 2024-10-30 PROCEDURE — 85025 COMPLETE CBC W/AUTO DIFF WBC: CPT

## 2024-10-30 PROCEDURE — 80048 BASIC METABOLIC PNL TOTAL CA: CPT

## 2024-10-30 PROCEDURE — 99233 SBSQ HOSP IP/OBS HIGH 50: CPT | Performed by: INTERNAL MEDICINE

## 2024-10-30 PROCEDURE — 83540 ASSAY OF IRON: CPT | Performed by: STUDENT IN AN ORGANIZED HEALTH CARE EDUCATION/TRAINING PROGRAM

## 2024-10-30 PROCEDURE — 82728 ASSAY OF FERRITIN: CPT | Performed by: STUDENT IN AN ORGANIZED HEALTH CARE EDUCATION/TRAINING PROGRAM

## 2024-10-30 PROCEDURE — 99232 SBSQ HOSP IP/OBS MODERATE 35: CPT | Performed by: STUDENT IN AN ORGANIZED HEALTH CARE EDUCATION/TRAINING PROGRAM

## 2024-10-30 PROCEDURE — 82948 REAGENT STRIP/BLOOD GLUCOSE: CPT

## 2024-10-30 PROCEDURE — 84132 ASSAY OF SERUM POTASSIUM: CPT

## 2024-10-30 PROCEDURE — 80076 HEPATIC FUNCTION PANEL: CPT | Performed by: INTERNAL MEDICINE

## 2024-10-30 PROCEDURE — 82550 ASSAY OF CK (CPK): CPT | Performed by: INTERNAL MEDICINE

## 2024-10-30 PROCEDURE — 83550 IRON BINDING TEST: CPT | Performed by: STUDENT IN AN ORGANIZED HEALTH CARE EDUCATION/TRAINING PROGRAM

## 2024-10-30 RX ORDER — SODIUM CHLORIDE 9 MG/ML
75 INJECTION, SOLUTION INTRAVENOUS CONTINUOUS
Status: DISPENSED | OUTPATIENT
Start: 2024-10-30 | End: 2024-10-30

## 2024-10-30 RX ADMIN — ENOXAPARIN SODIUM 40 MG: 40 INJECTION SUBCUTANEOUS at 08:54

## 2024-10-30 RX ADMIN — SENNOSIDES AND DOCUSATE SODIUM 1 TABLET: 50; 8.6 TABLET ORAL at 21:41

## 2024-10-30 RX ADMIN — CARBIDOPA AND LEVODOPA 1 TABLET: 25; 100 TABLET ORAL at 17:45

## 2024-10-30 RX ADMIN — INSULIN LISPRO 2 UNITS: 100 INJECTION, SOLUTION INTRAVENOUS; SUBCUTANEOUS at 17:45

## 2024-10-30 RX ADMIN — OXYBUTYNIN CHLORIDE 5 MG: 5 TABLET ORAL at 21:41

## 2024-10-30 RX ADMIN — INSULIN LISPRO 2 UNITS: 100 INJECTION, SOLUTION INTRAVENOUS; SUBCUTANEOUS at 12:22

## 2024-10-30 RX ADMIN — BACLOFEN 10 MG: 10 TABLET ORAL at 21:41

## 2024-10-30 RX ADMIN — BACLOFEN 10 MG: 10 TABLET ORAL at 08:53

## 2024-10-30 RX ADMIN — BACLOFEN 10 MG: 10 TABLET ORAL at 17:45

## 2024-10-30 RX ADMIN — LEVOTHYROXINE SODIUM 25 MCG: 25 TABLET ORAL at 06:32

## 2024-10-30 RX ADMIN — PRAVASTATIN SODIUM 40 MG: 40 TABLET ORAL at 17:44

## 2024-10-30 RX ADMIN — LORAZEPAM 0.5 MG: 0.5 TABLET ORAL at 21:41

## 2024-10-30 RX ADMIN — CARBIDOPA AND LEVODOPA 1 TABLET: 25; 100 TABLET ORAL at 08:54

## 2024-10-30 RX ADMIN — OXYCODONE HYDROCHLORIDE AND ACETAMINOPHEN 1000 MG: 500 TABLET ORAL at 08:53

## 2024-10-30 RX ADMIN — INSULIN LISPRO 2 UNITS: 100 INJECTION, SOLUTION INTRAVENOUS; SUBCUTANEOUS at 00:10

## 2024-10-30 RX ADMIN — CARBIDOPA AND LEVODOPA 1 TABLET: 25; 100 TABLET ORAL at 12:23

## 2024-10-30 RX ADMIN — INSULIN LISPRO 3 UNITS: 100 INJECTION, SOLUTION INTRAVENOUS; SUBCUTANEOUS at 06:47

## 2024-10-30 RX ADMIN — SODIUM CHLORIDE 75 ML/HR: 0.9 INJECTION, SOLUTION INTRAVENOUS at 09:30

## 2024-10-30 RX ADMIN — ARIPIPRAZOLE 2 MG: 2 TABLET ORAL at 21:42

## 2024-10-30 RX ADMIN — INSULIN HUMAN 4 UNITS: 100 INJECTION, SOLUTION PARENTERAL at 00:09

## 2024-10-30 RX ADMIN — ACETAMINOPHEN 650 MG: 325 TABLET ORAL at 08:53

## 2024-10-30 RX ADMIN — PANTOPRAZOLE SODIUM 20 MG: 20 TABLET, DELAYED RELEASE ORAL at 06:32

## 2024-10-30 RX ADMIN — CARBIDOPA AND LEVODOPA 1 TABLET: 25; 100 TABLET ORAL at 21:41

## 2024-10-30 RX ADMIN — CITALOPRAM HYDROBROMIDE 20 MG: 20 TABLET, FILM COATED ORAL at 08:54

## 2024-10-30 RX ADMIN — VALPROIC ACID 125 MG: 250 SOLUTION ORAL at 21:40

## 2024-10-30 RX ADMIN — OXYBUTYNIN CHLORIDE 5 MG: 5 TABLET ORAL at 08:53

## 2024-10-30 RX ADMIN — VALPROIC ACID 125 MG: 250 SOLUTION ORAL at 08:53

## 2024-10-30 RX ADMIN — POLYETHYLENE GLYCOL 3350 17 G: 17 POWDER, FOR SOLUTION ORAL at 08:54

## 2024-10-30 RX ADMIN — OXYBUTYNIN CHLORIDE 5 MG: 5 TABLET ORAL at 17:44

## 2024-10-30 RX ADMIN — INSULIN HUMAN 4 UNITS: 100 INJECTION, SOLUTION PARENTERAL at 17:45

## 2024-10-30 NOTE — ASSESSMENT & PLAN NOTE
Patient persistently febrile since ~hospital day 3.  Blood cultures negative, multiple sets.  UA initially with TNTC WBC, Morganella on culture.  COVID/flu/RSV and Rp2 panel negative.  No LFT abnormalities.  CTAP with multifocal pulmonary infiltrates, no abdominal pathology and PEG site clean.  She completed 7 day course of ceftriaxone for pneumonia vs UTI.   Follow up chest XR showed improved infiltrates 10/28.  CTAP with contrast repeated 10/29 - largely resolving pulmonary opacities with call of posterior thigh muscle enhancement.  Full skin exam unremarkable, including posterior thigh - no fluctuance or erythema on exam.  Difficult to assess oropharynx - she has some whitish plaques on her tongue but may be from dry mouth.  CT of sinuses did not show pathology.  Lower extremity dopplers negative for DVT and lung VQ scan low probability of PE.  Resides in a care facility, so shouldn't have any unusual exposures.  Urine Legionella negative.  Normal bowel movements.  Tolerating tube feeds.  She is receiving all home medications, aside from heparin, which was changed to enoxaparin on 10/29.  Procalcitonin down-trended.  CBC shows persistent percentage of bands but stable WBC count, Hgb, and platelets.  Thyroid studies were normal earlier in admission.    Fortunately, she remains hemodynamically stable, off antibiotics, without a rising WBC count.  - Let's continue to monitor off antibiotics  - Would consider recurring aspiration vs drug fever - stop all nonessential meds  - Low suspicion for AI, but will check cortisol on AM labs  - Upper extremity dopplers for complete exam  - Trend CBC with differential  - Will consider repeat CT of left thigh in 48 hrs to assess developing collection.  CK is normal

## 2024-10-30 NOTE — ASSESSMENT & PLAN NOTE
Lab Results   Component Value Date    HGBA1C 6.2 (H) 08/13/2024       Recent Labs     10/29/24  0616 10/29/24  1230 10/29/24  1807 10/30/24  0006   POCGLU 220* 269* 173* 265*       Blood Sugar Average: Last 72 hrs:  (P) 199.5811871359074013  Home regimen: regular insulin 4 units twice daily at 6PM and midnight  10/22 Glucose levels elevated overnight, requiring insulin gtt  Unable to obtain Glucerna at this time due to supplier shortage    Plan  Continue PTA regimen of regular insulin 4 units twice daily (6PM and midnight)  Nutrition on board, appreciate recs  Switched tube feeds to vital 1.2 AF @ 80 mL/hr x12 hours per nutrition recs  Continue sliding scale Algorithm 1 q6h  Monitor blood glucose  Goal -180 while admitted, adjusting insulin regimen as appropriate

## 2024-10-30 NOTE — ASSESSMENT & PLAN NOTE
10/21 Met SIRS criteria with fever, tachycardia  10/21 CXR - Faint inferior perihilar patchiness with subtle obscuration of the right cardiomediastinal silhouette could represent findings of aspiration in the appropriate clinical context.  Urinalysis - innumerable leukocytes and moderate bacteria on microscopy  Urine culture growing Morganella (susceptible to ceftriaxone)  10/22 Blood cultures negative to date  Elevated procalcitonin, bandemia  CT CAP - Multilobar pneumonia. No acute findings in the abdomen or pelvis. Prominent rectal vault stool without bowel obstruction.  CT temporal bones - no evidence of mastoiditis  Recurrent fevers. Worsening leukocytosis, bandemia. Mental status unchanged  10/28 CXR showed improved bilateral perihilar consolidations suggesting improved pneumonia  RP2 negative  Legionella urine antigen negative  LE Dopplers show no evidence of acute/chronic DVT  Continues to have fevers. Leukocytosis mildly improved. Mental status at baseline.   CT CAP showed decreased left greater than right bibasilar opacities, likely representing combination of atelectasis and pneumonia. Thickening on left proximal posterior thigh muscle with ill-defined linear hyperenhancement likely infectious vs inflammatory. Small nodular rim-enhancing focus in lower uterus/cervix possible small fibroid  10/29 blood cx pending  Fever etiology unclear at this time. Considering infectious vs. Drug-induced vs. Malignancy (though less likely at this time as work-up so far has been unremarkable)    Plan  Infectious disease on board, appreciate recommendations  Completed 7-day course of Ceftriaxone on 10/28  Switched IV Pantoprazole to oral pantoprazole  Switched DVT ppx from heparin to lovenox  Avoid nonessential meds  Will obtain UE dopplers  If continues to be febrile, will consider repeating CT of left thigh in 48 hours to assess developing collection.  Monitor temp  Trend WBCs

## 2024-10-30 NOTE — PROGRESS NOTES
Progress Note - Infectious Disease   Name: Terrie Alicea 64 y.o. female I MRN: [id number]  Unit/Bed#: S -01 I Date of Admission: 10/18/2024   Date of Service: 10/30/2024 I Hospital Day: 12    Assessment & Plan  Fever of unknown origin  Patient persistently febrile since ~hospital day 3.  Blood cultures negative, multiple sets.  UA initially with TNTC WBC, Morganella on culture.  COVID/flu/RSV and Rp2 panel negative.  No LFT abnormalities.  CTAP with multifocal pulmonary infiltrates, no abdominal pathology and PEG site clean.  She completed 7 day course of ceftriaxone for pneumonia vs UTI.   Follow up chest XR showed improved infiltrates 10/28.  CTAP with contrast repeated 10/29 - largely resolving pulmonary opacities with call of posterior thigh muscle enhancement.  Full skin exam unremarkable, including posterior thigh - no fluctuance or erythema on exam.  Difficult to assess oropharynx - she has some whitish plaques on her tongue but may be from dry mouth.  CT of sinuses did not show pathology.  Lower extremity dopplers negative for DVT and lung VQ scan low probability of PE.  Resides in a care facility, so shouldn't have any unusual exposures.  Urine Legionella negative.  Normal bowel movements.  Tolerating tube feeds.  She is receiving all home medications, aside from heparin, which was changed to enoxaparin on 10/29.  Procalcitonin down-trended.  CBC shows persistent percentage of bands but stable WBC count, Hgb, and platelets.  Thyroid studies were normal earlier in admission.    Fortunately, she remains hemodynamically stable, off antibiotics, without a rising WBC count.  - Let's continue to monitor off antibiotics  - Would consider recurring aspiration vs drug fever - stop all nonessential meds  - Low suspicion for AI, but will check cortisol on AM labs  - Upper extremity dopplers for complete exam  - Trend CBC with differential  - Will consider repeat CT of left thigh in 48 hrs to assess developing  collection.  CK is normal  UTI (urinary tract infection)  Urine culture growing Morganella, TNTC WBC.  No indwelling catheter.  - Completed ceftriaxone course  Cerebral palsy (HCC)  Continue home medications  Bipolar disorder ([facility])  Continue home medications  Diabetes mellitus type 2, insulin dependent ([facility])  Lab Results   Component Value Date    HGBA1C 6.2 (H) 2024     Recent Labs     10/29/24  1807 10/30/24  0006 10/30/24  0645 10/30/24  1217   POCGLU 173* 265* 366* 278*   This is a risk factor for infection.  Well-controlled in this patient.  - Maintain euglycemia to improve WBC function    Parkinson's disease (HCC)  Continue home medication.  Hypothyroidism  TFT's were normal earlier in admission.      I have discussed the above management plan in detail with the primary service, Dr Ricardo, who agrees with additional workup above for fever and monitoring off antibiotics.  We will continue to follow along with you.    I have performed an extensive review of the medical records in Epic including review of the notes, radiographs, and laboratory results     Antibiotics:  Off antibiotics day 2    24 Hour Events:  Continued fevers.      Subjective:  Patient non-verbal.  Saw with bedside RN.  Examined left posterior thigh area - no pain with palpation.    Objective:  Vitals:  Temp:  [98.5 °F (36.9 °C)-101.7 °F (38.7 °C)] 100.9 °F (38.3 °C)  HR:  [] 115  Resp:  [15-20] 20  BP: (100-130)/(61-75) 130/75  SpO2:  [94 %-97 %] 95 %  Temp (24hrs), Av.3 °F (37.9 °C), Min:98.5 °F (36.9 °C), Max:101.7 °F (38.7 °C)  Current: Temperature: (!) 100.9 °F (38.3 °C)      Physical Exam:   General: Appears chronically ill, in no apparent distress  HEENT:  Normocephalic, sclera anicteric  Heart:  RRR, no murmurs, rubs, or gallops  Lungs:  Clear to auscultation bilaterally  Abdomen:  Soft, nontender, nondistended, normal bowel sounds  :  No suprapubic tenderness  Extremities:  No cyanosis or edema  Skin:  No rashes,  lesions.  No tenderness, fluctuance, or erythema of posterior thigh  Neuro:  Awake, alert, tracking to voice      Labs:   All pertinent labs and imaging studies were personally reviewed  Results from last 7 days   Lab Units 10/30/24  0734 10/29/24  0516 10/28/24  0502   WBC Thousand/uL 10.38* 11.63* 12.75*   HEMOGLOBIN g/dL 8.8* 10.2* 9.5*   PLATELETS Thousands/uL 264 244 256     Results from last 7 days   Lab Units 10/30/24  0855 10/30/24  0734 10/29/24  0516 10/28/24  0502 10/27/24  0424 10/26/24  0424   SODIUM mmol/L  --  130* 137 141   < > 143   POTASSIUM mmol/L 5.1 5.8* 4.9 5.1   < > 5.4*   CHLORIDE mmol/L  --  101 104 111*   < > 114*   CO2 mmol/L  --  25 25 22   < > 23   BUN mg/dL  --  27* 42* 40*   < > 44*   CREATININE mg/dL  --  1.01 1.12 1.17   < > 1.32*   EGFR ml/min/1.73sq m  --  59 52 50   < > 43   CALCIUM mg/dL  --  8.3* 8.6 8.7   < > 8.0*   AST U/L  --  36  --   --   --  37   ALT U/L  --  18  --   --   --  14   ALK PHOS U/L  --  43  --   --   --  60    < > = values in this interval not displayed.     Results from last 7 days   Lab Units 10/29/24  0516 10/24/24  0802   PROCALCITONIN ng/ml 0.28* 3.26*     Results from last 7 days   Lab Units 10/29/24  0516   CRP mg/L 7.4*     Results from last 7 days   Lab Units 10/29/24  0516   FERRITIN ng/mL 447*             Microbiology:  Results from last 7 days   Lab Units 10/29/24  1148 10/28/24  1432   BLOOD CULTURE  Received in Microbiology Lab. Culture in Progress.  Received in Microbiology Lab. Culture in Progress.  --    LEGIONELLA URINARY ANTIGEN   --  Negative       Imaging:  CTAP with contrast 10/29/24:  I independently reviewed the images and reports in PACS.  There appears to be resolving pulmonary opacities.  Left thigh muscle with area of enhancement.      Leander Dodd MD  [facility]

## 2024-10-30 NOTE — PLAN OF CARE
Problem: Prexisting or High Potential for Compromised Skin Integrity  Goal: Skin integrity is maintained or improved  Description: INTERVENTIONS:  - Identify patients at risk for skin breakdown  - Assess and monitor skin integrity  - Assess and monitor nutrition and hydration status  - Monitor labs   - Assess for incontinence   - Turn and reposition patient  - Assist with mobility/ambulation  - Relieve pressure over bony prominences  - Avoid friction and shearing  - Provide appropriate hygiene as needed including keeping skin clean and dry  - Evaluate need for skin moisturizer/barrier cream  - Collaborate with interdisciplinary team   - Patient/family teaching  - Consider wound care consult   Outcome: Progressing     Problem: PAIN - ADULT  Goal: Verbalizes/displays adequate comfort level or baseline comfort level  Description: Interventions:  - Encourage patient to monitor pain and request assistance  - Assess pain using appropriate pain scale  - Administer analgesics based on type and severity of pain and evaluate response  - Implement non-pharmacological measures as appropriate and evaluate response  - Consider cultural and social influences on pain and pain management  - Notify physician/advanced practitioner if interventions unsuccessful or patient reports new pain  Outcome: Progressing     Problem: INFECTION - ADULT  Goal: Absence or prevention of progression during hospitalization  Description: INTERVENTIONS:  - Assess and monitor for signs and symptoms of infection  - Monitor lab/diagnostic results  - Monitor all insertion sites, i.e. indwelling lines, tubes, and drains  - Monitor endotracheal if appropriate and nasal secretions for changes in amount and color  - [place] appropriate cooling/warming therapies per order  - Administer medications as ordered  - Instruct and encourage patient and family to use good hand hygiene technique  - Identify and instruct in appropriate isolation precautions for  identified infection/condition  Outcome: Progressing  Goal: Absence of fever/infection during neutropenic period  Description: INTERVENTIONS:  - Monitor WBC    Outcome: Progressing     Problem: SAFETY ADULT  Goal: Patient will remain free of falls  Description: INTERVENTIONS:  - Educate patient/family on patient safety including physical limitations  - Instruct patient to call for assistance with activity   - Consult OT/PT to assist with strengthening/mobility   - Keep Call bell within reach  - Keep bed low and locked with side rails adjusted as appropriate  - Keep care items and personal belongings within reach  - Initiate and maintain comfort rounds  - Make Fall Risk Sign visible to staff  - Offer Toileting every 2 Hours, in advance of need  - Initiate/Maintain bed/chair alarm  - Obtain necessary fall risk management equipment: yellow bracelet/socks  - Apply yellow socks and bracelet for high fall risk patients  - Consider moving patient to room near nurses station  Outcome: Progressing  Goal: Maintain or return to baseline ADL function  Description: INTERVENTIONS:  -  Assess patient's ability to carry out ADLs; assess patient's baseline for ADL function and identify physical deficits which impact ability to perform ADLs (bathing, care of mouth/teeth, toileting, grooming, dressing, etc.)  - Assess/evaluate cause of self-care deficits   - Assess range of motion  - Assess patient's mobility; develop plan if impaired  - Assess patient's need for assistive devices and provide as appropriate  - Encourage maximum independence but intervene and supervise when necessary  - Involve family in performance of ADLs  - Assess for home care needs following discharge   - Consider OT consult to assist with ADL evaluation and planning for discharge  - Provide patient education as appropriate  Outcome: Progressing  Goal: Maintains/Returns to pre admission functional level  Description: INTERVENTIONS:  - Perform AM-PAC 6 Click Basic  Mobility/ Daily Activity assessment daily.  - Set and communicate daily mobility goal to care team and patient/family/caregiver.   - Collaborate with rehabilitation services on mobility goals if consulted  - Perform Range of Motion 3 times a day.  - Reposition patient every 2 hours.  - Dangle patient 3 times a day  - Stand patient 3 times a day  - Ambulate patient 3 times a day  - Out of bed to chair 3 times a day   - Out of bed for meals 3 times a day  - Out of bed for toileting  - Record patient progress and toleration of activity level   Outcome: Progressing     Problem: DISCHARGE PLANNING  Goal: Discharge to home or other facility with appropriate resources  Description: INTERVENTIONS:  - Identify barriers to discharge w/patient and caregiver  - Arrange for needed discharge resources and transportation as appropriate  - Identify discharge learning needs (meds, wound care, etc.)  - Arrange for interpretive services to assist at discharge as needed  - Refer to Case Management Department for coordinating discharge planning if the patient needs post-hospital services based on physician/advanced practitioner order or complex needs related to functional status, cognitive ability, or social support system  Outcome: Progressing     Problem: Knowledge Deficit  Goal: Patient/family/caregiver demonstrates understanding of disease process, treatment plan, medications, and discharge instructions  Description: Complete learning assessment and assess knowledge base.  Interventions:  - Provide teaching at level of understanding  - Provide teaching via preferred learning methods  Outcome: Progressing     Problem: Nutrition/Hydration-ADULT  Goal: Nutrient/Hydration intake appropriate for improving, restoring or maintaining nutritional needs  Description: Monitor and assess patient's nutrition/hydration status for malnutrition. Collaborate with interdisciplinary team and initiate plan and interventions as ordered.  Monitor  patient's weight and dietary intake as ordered or per policy. Utilize nutrition screening tool and intervene as necessary. Determine patient's food preferences and provide high-protein, high-caloric foods as appropriate.     INTERVENTIONS:  - Monitor oral intake, urinary output, labs, and treatment plans  - Assess nutrition and hydration status and recommend course of action  - Evaluate amount of meals eaten  - Assist patient with eating if necessary   - Allow adequate time for meals  - Recommend/ encourage appropriate diets, oral nutritional supplements, and vitamin/mineral supplements  - Order, calculate, and assess calorie counts as needed  - Recommend, monitor, and adjust tube feedings and TPN/PPN based on assessed needs  - Assess need for intravenous fluids  - Provide specific nutrition/hydration education as appropriate  - Include patient/family/caregiver in decisions related to nutrition  Outcome: Progressing     Problem: Potential for Falls  Goal: Patient will remain free of falls  Description: INTERVENTIONS:  - Educate patient/family on patient safety including physical limitations  - Instruct patient to call for assistance with activity   - Consult OT/PT to assist with strengthening/mobility   - Keep Call bell within reach  - Keep bed low and locked with side rails adjusted as appropriate  - Keep care items and personal belongings within reach  - Initiate and maintain comfort rounds  - Make Fall Risk Sign visible to staff  - Offer Toileting every 2 Hours, in advance of need  - Initiate/Maintain bed/chair alarm  - Obtain necessary fall risk management equipment: yellow bracelet/socks  - Apply yellow socks and bracelet for high fall risk patients  - Consider moving patient to room near nurses station  Outcome: Progressing

## 2024-10-30 NOTE — PLAN OF CARE
Problem: Prexisting or High Potential for Compromised Skin Integrity  Goal: Skin integrity is maintained or improved  Description: INTERVENTIONS:  - Identify patients at risk for skin breakdown  - Assess and monitor skin integrity  - Assess and monitor nutrition and hydration status  - Monitor labs   - Assess for incontinence   - Turn and reposition patient  - Assist with mobility/ambulation  - Relieve pressure over bony prominences  - Avoid friction and shearing  - Provide appropriate hygiene as needed including keeping skin clean and dry  - Evaluate need for skin moisturizer/barrier cream  - Collaborate with interdisciplinary team   - Patient/family teaching  - Consider wound care consult   Outcome: Progressing     Problem: PAIN - ADULT  Goal: Verbalizes/displays adequate comfort level or baseline comfort level  Description: Interventions:  - Encourage patient to monitor pain and request assistance  - Assess pain using appropriate pain scale  - Administer analgesics based on type and severity of pain and evaluate response  - Implement non-pharmacological measures as appropriate and evaluate response  - Consider cultural and social influences on pain and pain management  - Notify physician/advanced practitioner if interventions unsuccessful or patient reports new pain  Outcome: Progressing     Problem: INFECTION - ADULT  Goal: Absence or prevention of progression during hospitalization  Description: INTERVENTIONS:  - Assess and monitor for signs and symptoms of infection  - Monitor lab/diagnostic results  - Monitor all insertion sites, i.e. indwelling lines, tubes, and drains  - Monitor endotracheal if appropriate and nasal secretions for changes in amount and color  - [place] appropriate cooling/warming therapies per order  - Administer medications as ordered  - Instruct and encourage patient and family to use good hand hygiene technique  - Identify and instruct in appropriate isolation precautions for  identified infection/condition  Outcome: Progressing  Goal: Absence of fever/infection during neutropenic period  Description: INTERVENTIONS:  - Monitor WBC    Outcome: Progressing     Problem: SAFETY ADULT  Goal: Patient will remain free of falls  Description: INTERVENTIONS:  - Educate patient/family on patient safety including physical limitations  - Instruct patient to call for assistance with activity   - Consult OT/PT to assist with strengthening/mobility   - Keep Call bell within reach  - Keep bed low and locked with side rails adjusted as appropriate  - Keep care items and personal belongings within reach  - Initiate and maintain comfort rounds  - Make Fall Risk Sign visible to staff  - Apply yellow socks and bracelet for high fall risk patients  - Consider moving patient to room near nurses station  Outcome: Progressing  Goal: Maintain or return to baseline ADL function  Description: INTERVENTIONS:  -  Assess patient's ability to carry out ADLs; assess patient's baseline for ADL function and identify physical deficits which impact ability to perform ADLs (bathing, care of mouth/teeth, toileting, grooming, dressing, etc.)  - Assess/evaluate cause of self-care deficits   - Assess range of motion  - Assess patient's mobility; develop plan if impaired  - Assess patient's need for assistive devices and provide as appropriate  - Encourage maximum independence but intervene and supervise when necessary  - Involve family in performance of ADLs  - Assess for home care needs following discharge   - Consider OT consult to assist with ADL evaluation and planning for discharge  - Provide patient education as appropriate  Outcome: Progressing  Goal: Maintains/Returns to pre admission functional level  Description: INTERVENTIONS:  - Perform AM-PAC 6 Click Basic Mobility/ Daily Activity assessment daily.  - Set and communicate daily mobility goal to care team and patient/family/caregiver.   - Collaborate with rehabilitation  services on mobility goals if consulted  - Perform Range of Motion 3 times a day.  - Reposition patient every 2 hours.  - Record patient progress and toleration of activity level   Outcome: Progressing     Problem: DISCHARGE PLANNING  Goal: Discharge to home or other facility with appropriate resources  Description: INTERVENTIONS:  - Identify barriers to discharge w/patient and caregiver  - Arrange for needed discharge resources and transportation as appropriate  - Identify discharge learning needs (meds, wound care, etc.)  - Arrange for interpretive services to assist at discharge as needed  - Refer to Case Management Department for coordinating discharge planning if the patient needs post-hospital services based on physician/advanced practitioner order or complex needs related to functional status, cognitive ability, or social support system  Outcome: Progressing     Problem: Knowledge Deficit  Goal: Patient/family/caregiver demonstrates understanding of disease process, treatment plan, medications, and discharge instructions  Description: Complete learning assessment and assess knowledge base.  Interventions:  - Provide teaching at level of understanding  - Provide teaching via preferred learning methods  Outcome: Progressing     Problem: Nutrition/Hydration-ADULT  Goal: Nutrient/Hydration intake appropriate for improving, restoring or maintaining nutritional needs  Description: Monitor and assess patient's nutrition/hydration status for malnutrition. Collaborate with interdisciplinary team and initiate plan and interventions as ordered.  Monitor patient's weight and dietary intake as ordered or per policy. Utilize nutrition screening tool and intervene as necessary. Determine patient's food preferences and provide high-protein, high-caloric foods as appropriate.     INTERVENTIONS:  - Monitor oral intake, urinary output, labs, and treatment plans  - Assess nutrition and hydration status and recommend course of  action  - Evaluate amount of meals eaten  - Assist patient with eating if necessary   - Allow adequate time for meals  - Recommend/ encourage appropriate diets, oral nutritional supplements, and vitamin/mineral supplements  - Order, calculate, and assess calorie counts as needed  - Recommend, monitor, and adjust tube feedings and TPN/PPN based on assessed needs  - Assess need for intravenous fluids  - Provide specific nutrition/hydration education as appropriate  - Include patient/family/caregiver in decisions related to nutrition  Outcome: Progressing     Problem: Potential for Falls  Goal: Patient will remain free of falls  Description: INTERVENTIONS:  - Educate patient/family on patient safety including physical limitations  - Instruct patient to call for assistance with activity   - Consult OT/PT to assist with strengthening/mobility   - Keep Call bell within reach  - Keep bed low and locked with side rails adjusted as appropriate  - Keep care items and personal belongings within reach  - Initiate and maintain comfort rounds  - Make Fall Risk Sign visible to staff  - Initiate/Maintain bed alarm  - Apply yellow socks and bracelet for high fall risk patients  - Consider moving patient to room near nurses station  Outcome: Progressing

## 2024-10-30 NOTE — CASE MANAGEMENT
Case Management Discharge Planning Note    Patient name Terrie Alicea  Location S /S -01 MRN [id number]  : 1960 Date 10/30/2024       Current Admission Date: 10/18/2024  Current Admission Diagnosis:Cerebral palsy (HCC)   Patient Active Problem List    Diagnosis Date Noted Date Diagnosed    History of aspiration pneumonia 10/10/2024     Mood disturbance 2024     Fever of unknown origin 09/15/2024     Mood disturbances 2024     Severe protein-calorie malnutrition (HCC) 2024     Functional quadriplegia (HCC) 2024     Protein-calorie malnutrition, unspecified severity (HCC) 2024     Hospital discharge follow-up 2024     SIRS (systemic inflammatory response syndrome) ([facility]) 2024     Developmental delay      Preop examination 2023     Cataract 2023     Internal hemorrhoids 2023     Contracture of right hand 2023     Need for shingles vaccine 2023     Viral illness 2022     Dystonic cerebral palsy (HCC) 2022     Cervical dystonia 2022     Parkinson's disease ([facility]) 2022     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[facility]) 10/18/2021     Polyneuropathy associated with underlying disease ([facility]) 10/18/2021     Herpes zoster without complication 2021     Abnormal finding on lung imaging 2021     Anemia 2020     Eosinophilic leukocytosis 2020     Underweight 2020     Dysphagia 2020     History of vitamin D deficiency 2020     History of fall 10/28/2019     Breast asymmetry 2019     Hypernatremia 2018     Sepsis ([facility]) 2018     Gait disturbance 2018     Osteoarthritis of both hips 2018     Severe Intellectual disability 2018     Diabetes mellitus type 2, insulin dependent (HCC) 2018     Abnormal albumin 2017     Peripheral neuropathy 2017     Seasonal allergies 2017     Physical deconditioning 2017     Hyperlipidemia 2017      Gastroesophageal reflux disease 03/27/2017     Cerebral palsy (HCC) 03/27/2017     Spasticity 03/27/2017     Ambulatory dysfunction 03/27/2017     UTI (urinary tract infection) 03/27/2017     Bipolar disorder ([facility]) 03/27/2017     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[facility]) 02/01/2016     Weight loss 01/21/2016     Osteoporosis 01/19/2016     Resting tremor 01/19/2016     Acute metabolic encephalopathy 12/11/2015     Gait abnormality 12/11/2015     Other chronic pain 07/30/2015     Postmenopausal vaginal bleeding 07/23/2015     Anxiety 07/15/2015     Menopause 09/25/2014     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/28/2014     Atopic dermatitis 06/02/2014     Dry eye 06/02/2014     Constipation 04/12/2013     Iron deficiency anemia 03/27/2013     Urinary incontinence 03/27/2013     Hypothyroidism 01/10/2013       LOS (days): 12  Geometric Mean LOS (GMLOS) (days):   Days to GMLOS:     OBJECTIVE:  Risk of Unplanned Readmission Score: 49.73         Current admission status: Inpatient   Preferred Pharmacy:   [facility] [place], [place] - [address]  [address]  [address]  [place] [address]  Phone: [phone] Fax: [phone]    [facility]  [address]  [place] [address]  Phone: [phone] Fax: [phone]    [facility] - [place], [place] - [address]  [address] [facility] [address]  Phone: [phone] Fax: [phone]    [facility]) - [place] - [address]  [address]  [place] [address]  Phone: [phone] Fax: [phone]    [place], [place] - [address] [facility] [address] [facility]  [address] [facility] [address]  Phone: [phone] Fax: [phone]    Primary Care Provider: Gunnar Sylvester DO    Primary Insurance: [employer] MEDICARE MC REP  Secondary Insurance: [place] MEDICAL [place]    DISCHARGE DETAILS:    Discharge planning discussed with:: Patient  caregiver  Freedom of Choice: Yes  Comments - Freedom of Choice: Return to group home  CM contacted family/caregiver?: Yes (Caregiver contacted via phone)  Were Treatment Team discharge recommendations reviewed with patient/caregiver?: Yes  Did patient/caregiver verbalize understanding of patient care needs?: Yes  Were patient/caregiver advised of the risks associated with not following Treatment Team discharge recommendations?: Yes    Contacts  Patient Contacts: Lisa Penny (Care Giver)  Contact Method: Phone  Phone Number: [phone]  Reason/Outcome: Discharge Planning, Emergency Contact, Continuity of Care    Other Referral/Resources/Interventions Provided:  Interventions: Other (Specify), DME  Referral Comments: CM placed call to pt caregiver Lisa to f/u on dcp. Lisa reported she has not ehard from [facility] re: order for test strips and lancets that was ordered. CM sent f/u message to [facility] team to request an update on order status, as order was sent to [email or web address] [facility]. Awaiting response. CM will continue to follow re: dcp.

## 2024-10-30 NOTE — NUTRITION
Unable to procure Glucerna 1.2 at this time - currently on order per kitchen/supplier    Suggest Vital 1.2 AF @ 80 ml/hr x 12 hours.     Provides 1152 kcal, 72 g protein, 779 ml free water. Suggest 100 ml flush q 4 hours x 24 hours. Total free water 1379 ml

## 2024-10-30 NOTE — ASSESSMENT & PLAN NOTE
Recent Labs     10/28/24  0502 10/29/24  0516   HGB 9.5* 10.2*   Baseline: 9-11, currently hemoglobin is baseline  Ordered iron studies  Daily CBC  Continue PTA ferrous sulfate  Transfuse if hemoglobin less than 7

## 2024-10-30 NOTE — CASE MANAGEMENT
Case Management Progress Note    Patient name Terrie Alicea  Location S /S -01 MRN [id number]  : 1960 Date 10/30/2024       LOS (days): 12  Geometric Mean LOS (GMLOS) (days):   Days to GMLOS:        OBJECTIVE:        Current admission status: Inpatient  Preferred Pharmacy:   [facility] [place] - [address]  [address]  [address]  [place] [address]  Phone: [phone] Fax: [phone]    [facility]  [address]  [place] [address]  Phone: [phone] Fax: [phone]    [facility] - [place], [place] - [address]  [address]  [facility] [address]  Phone: [phone] Fax: [phone]    [facility]) - [place] - [address]  [address]  [place] [address]  Phone: [phone] Fax: [phone]    [place], [place] - [address] [facility]  [address] [facility] [address]  Phone: [phone] Fax: [phone]    Primary Care Provider: Gunnar Sylvester DO    Primary Insurance: [employer] MEDICARE MC REP  Secondary Insurance[place]    PROGRESS NOTE:    Weekly Care Management Length of Stay Review     Current LOS: 12 Days    Most Recent Labs:     Lab Results   Component Value Date/Time    WBC 10.38 (H) 10/30/2024 07:34 AM    HGB 8.8 (L) 10/30/2024 07:34 AM    HCT 27.4 (L) 10/30/2024 07:34 AM     10/30/2024 07:34 AM    BANDSPCT 8 10/29/2024 05:16 AM    SODIUM 130 (L) 10/30/2024 07:34 AM    K 5.1 10/30/2024 08:55 AM     10/30/2024 07:34 AM    CO2 25 10/30/2024 07:34 AM    BUN 27 (H) 10/30/2024 07:34 AM    CREATININE 1.01 10/30/2024 07:34 AM    GLUC 318 (H) 10/30/2024 07:34 AM    ALKPHOS 43 10/30/2024 07:34 AM    ALT 18 10/30/2024 07:34 AM    AST 36 10/30/2024 07:34 AM    ALB 3.4 (L) 10/30/2024 07:34 AM    TBILI 0.34 10/30/2024 07:34 AM       Most Recent Vitals:   Vitals:    10/30/24 1033    BP:    Pulse: (!) 115   Resp:    Temp: (!) 100.9 °F (38.3 °C)   SpO2: 95%        Identified Barriers to Discharge/Discharge Goals/Care Management Interventions: sepsis, ID on board, repeat Blood cultures, UTI, hx of cerebral palsy, parkinson's disease.     Intended Discharge Disposition: Return to group home-no weekend dc    Expected Discharge Date:  TBD

## 2024-10-30 NOTE — ASSESSMENT & PLAN NOTE
Lab Results   Component Value Date    HGBA1C 6.2 (H) 08/13/2024     Recent Labs     10/29/24  1807 10/30/24  0006 10/30/24  0645 10/30/24  1217   POCGLU 173* 265* 366* 278*   This is a risk factor for infection.  Well-controlled in this patient.  - Maintain euglycemia to improve WBC function

## 2024-10-30 NOTE — CASE MANAGEMENT
Case Management Assessment    Patient name Terrie Alicea  Location S /S -01 MRN [id number]  : 1960 Date 10/30/2024       Current Admission Date: 10/18/2024  Current Admission Diagnosis:Cerebral palsy (HCC)   Patient Active Problem List    Diagnosis Date Noted Date Diagnosed    History of aspiration pneumonia 10/10/2024     Mood disturbance 2024     Fever of unknown origin 09/15/2024     Mood disturbances 2024     Severe protein-calorie malnutrition (HCC) 2024     Functional quadriplegia (HCC) 2024     Protein-calorie malnutrition, unspecified severity ([facility]) 2024     Hospital discharge follow-up 2024     SIRS (systemic inflammatory response syndrome) ([facility]) 2024     Developmental delay      Preop examination 2023     Cataract 2023     Internal hemorrhoids 2023     Contracture of right hand 2023     Need for shingles vaccine 2023     Viral illness 2022     Dystonic cerebral palsy (HCC) 2022     Cervical dystonia 2022     Parkinson's disease ([facility]) 2022     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[facility]) 10/18/2021     Polyneuropathy associated with underlying disease ([facility]) 10/18/2021     Herpes zoster without complication 2021     Abnormal finding on lung imaging 2021     Anemia 2020     Eosinophilic leukocytosis 2020     Underweight 2020     Dysphagia 2020     History of vitamin D deficiency 2020     History of fall 10/28/2019     Breast asymmetry 2019     Hypernatremia 2018     Sepsis ([facility]) 2018     Gait disturbance 2018     Osteoarthritis of both hips 2018     Severe Intellectual disability 2018     Diabetes mellitus type 2, insulin dependent (HCC) 2018     Abnormal albumin 2017     Peripheral neuropathy 2017     Seasonal allergies 2017     Physical deconditioning 2017     Hyperlipidemia 2017      Gastroesophageal reflux disease 03/27/2017     Cerebral palsy (HCC) 03/27/2017     Spasticity 03/27/2017     Ambulatory dysfunction 03/27/2017     UTI (urinary tract infection) 03/27/2017     Bipolar disorder ([facility]) 03/27/2017     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/2016     Weight loss 01/21/2016     Osteoporosis 01/19/2016     Resting tremor 01/19/2016     Acute metabolic encephalopathy 12/11/2015     Gait abnormality 12/11/2015     Other chronic pain 07/30/2015     Postmenopausal vaginal bleeding 07/23/2015     Anxiety 07/15/2015     Menopause 09/25/2014     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/28/2014     Atopic dermatitis 06/02/2014     Dry eye 06/02/2014     Constipation 04/12/2013     Iron deficiency anemia 03/27/2013     Urinary incontinence 03/27/2013     Hypothyroidism 01/10/2013       LOS (days): 12  Geometric Mean LOS (GMLOS) (days):   Days to GMLOS:     OBJECTIVE:  PATIENT READMITTED TO HOSPITAL  Risk of Unplanned Readmission Score: 54.26         Current admission status: Inpatient       Preferred Pharmacy:   [facility] [place], [place] - [address] [facility]  [address]  [address]  [place] [address]  Phone: [phone] Fax: [phone]    [facility]  [address]  [place] [address]  Phone: [phone] Fax: [phone]    [facility] - [place], [place] - [address]  [address] [address]  Phone: [phone] Fax: [phone]    [facility] [place] - [address]  [address]  [place] [address]  Phone: [phone] Fax: [phone]    [place], [place] - [address] [facility]  [address] [facility] [address]  Phone: [phone] Fax: [phone]    Primary Care Provider: Gunnar Sylvester DO    Primary Insurance: [employer] MEDICARE  REP  Secondary Insurance: [place] MED[place]    ASSESSMENT:  Active Health Care  Proxies       Ella Morgan [facility] Representative - Other, Legal Guardian   Primary Phone: [phone] (Mobile)  Work Phone: [phone]                           Readmission Root Cause  30 Day Readmission: Yes  During your hospital stay, did someone (provider, nurse, ) explain your care to you in a way you could understand?: Yes  Did you feel medically stable to leave the hospital?: Yes  Were you able to pay for your medication at the pharmacy?: Yes  Did you have reliable transportation to take you to your appointments?: Yes  During previous admission, was a post-acute recommendation made?: No  Patient was readmitted due to: Cerebral palsy, fevers, sepsis  Action Plan: ID consult    Patient Information  Admitted from:: Facility  Mental Status: Confused  During Assessment patient was accompanied by: Not accompanied during assessment  Assessment information provided by:: Other - please comment ([facility] Caregiver)  Support Systems: Organized support group (Comment), /  County of Residence: [place]  What city do you live in?: [place]  Home entry access options. Select all that apply.: No steps to enter home  Type of Current Residence: Group home  Living Arrangements: Lives in Facility    Activities of Daily Living Prior to Admission  Functional Status: Total dependent  Completes ADLs independently?: No  Level of ADL dependence: Total Dependent  Ambulates independently?: No  Level of ambulatory dependence: Total Dependent  Does patient use assisted devices?: Yes  Assisted Devices (DME) used: Gerardo lift, Wheelchair  Does the patient have a history of Short-Term Rehab?: Yes  Does patient have a history of HHC?: No  Does patient currently have HHC?: No    Patient Information Continued  Income Source: SSI/SSD  Does patient have prescription coverage?: Yes  Does patient receive dialysis treatments?: No  Does patient have a history of substance abuse?: No  Does patient  have a history of Mental Health Diagnosis?: Yes  Is patient receiving treatment for mental health?: Yes (Mood disorder, bipolar disorder)  Has patient received inpatient treatment related to mental health in the last 2 years?: Yes    Means of Transportation  Means of Transport to Appts:: Other (Comment) (Group home)

## 2024-10-31 ENCOUNTER — APPOINTMENT (INPATIENT)
Dept: VASCULAR ULTRASOUND | Facility: HOSPITAL | Age: 64
DRG: 640 | End: 2024-10-31
Payer: MEDICARE

## 2024-10-31 ENCOUNTER — TELEPHONE (OUTPATIENT)
Dept: FAMILY MEDICINE CLINIC | Facility: CLINIC | Age: 64
End: 2024-10-31

## 2024-10-31 ENCOUNTER — APPOINTMENT (INPATIENT)
Dept: ULTRASOUND IMAGING | Facility: HOSPITAL | Age: 64
DRG: 640 | End: 2024-10-31
Payer: MEDICARE

## 2024-10-31 LAB
ANION GAP SERPL CALCULATED.3IONS-SCNC: 5 MMOL/L (ref 4–13)
BASOPHILS # BLD AUTO: 0.04 THOUSANDS/ΜL (ref 0–0.1)
BASOPHILS NFR BLD AUTO: 0 % (ref 0–1)
BUN SERPL-MCNC: 29 MG/DL (ref 5–25)
CALCIUM SERPL-MCNC: 8.3 MG/DL (ref 8.4–10.2)
CHLORIDE SERPL-SCNC: 107 MMOL/L (ref 96–108)
CO2 SERPL-SCNC: 25 MMOL/L (ref 21–32)
CREAT SERPL-MCNC: 0.87 MG/DL (ref 0.6–1.3)
EOSINOPHIL # BLD AUTO: 0.18 THOUSAND/ΜL (ref 0–0.61)
EOSINOPHIL NFR BLD AUTO: 2 % (ref 0–6)
ERYTHROCYTE [DISTWIDTH] IN BLOOD BY AUTOMATED COUNT: 14 % (ref 11.6–15.1)
FERRITIN SERPL-MCNC: 440 NG/ML (ref 11–307)
GFR SERPL CREATININE-BSD FRML MDRD: 71 ML/MIN/1.73SQ M
GLUCOSE SERPL-MCNC: 194 MG/DL (ref 65–140)
GLUCOSE SERPL-MCNC: 207 MG/DL (ref 65–140)
GLUCOSE SERPL-MCNC: 220 MG/DL (ref 65–140)
GLUCOSE SERPL-MCNC: 242 MG/DL (ref 65–140)
GLUCOSE SERPL-MCNC: 263 MG/DL (ref 65–140)
HCT VFR BLD AUTO: 26.5 % (ref 34.8–46.1)
HGB BLD-MCNC: 8.3 G/DL (ref 11.5–15.4)
HIV 1+2 AB+HIV1 P24 AG SERPL QL IA: NORMAL
HIV 2 AB SERPL QL IA: NORMAL
HIV1 AB SERPL QL IA: NORMAL
HIV1 P24 AG SERPL QL IA: NORMAL
IMM GRANULOCYTES # BLD AUTO: 0.19 THOUSAND/UL (ref 0–0.2)
IMM GRANULOCYTES NFR BLD AUTO: 2 % (ref 0–2)
IRON SATN MFR SERPL: 19 % (ref 15–50)
IRON SERPL-MCNC: 49 UG/DL (ref 50–212)
LYMPHOCYTES # BLD AUTO: 2.15 THOUSANDS/ΜL (ref 0.6–4.47)
LYMPHOCYTES NFR BLD AUTO: 23 % (ref 14–44)
MCH RBC QN AUTO: 32.9 PG (ref 26.8–34.3)
MCHC RBC AUTO-ENTMCNC: 31.3 G/DL (ref 31.4–37.4)
MCV RBC AUTO: 105 FL (ref 82–98)
MONOCYTES # BLD AUTO: 0.57 THOUSAND/ΜL (ref 0.17–1.22)
MONOCYTES NFR BLD AUTO: 6 % (ref 4–12)
NEUTROPHILS # BLD AUTO: 6.37 THOUSANDS/ΜL (ref 1.85–7.62)
NEUTS SEG NFR BLD AUTO: 67 % (ref 43–75)
NRBC BLD AUTO-RTO: 0 /100 WBCS
PLATELET # BLD AUTO: 258 THOUSANDS/UL (ref 149–390)
PMV BLD AUTO: 11.5 FL (ref 8.9–12.7)
POTASSIUM SERPL-SCNC: 4.7 MMOL/L (ref 3.5–5.3)
RBC # BLD AUTO: 2.52 MILLION/UL (ref 3.81–5.12)
SODIUM SERPL-SCNC: 137 MMOL/L (ref 135–147)
TIBC SERPL-MCNC: 261 UG/DL (ref 250–450)
UIBC SERPL-MCNC: 212 UG/DL (ref 155–355)
WBC # BLD AUTO: 9.5 THOUSAND/UL (ref 4.31–10.16)

## 2024-10-31 PROCEDURE — 85025 COMPLETE CBC W/AUTO DIFF WBC: CPT

## 2024-10-31 PROCEDURE — 87389 HIV-1 AG W/HIV-1&-2 AB AG IA: CPT | Performed by: INTERNAL MEDICINE

## 2024-10-31 PROCEDURE — 99232 SBSQ HOSP IP/OBS MODERATE 35: CPT | Performed by: STUDENT IN AN ORGANIZED HEALTH CARE EDUCATION/TRAINING PROGRAM

## 2024-10-31 PROCEDURE — 99233 SBSQ HOSP IP/OBS HIGH 50: CPT | Performed by: INTERNAL MEDICINE

## 2024-10-31 PROCEDURE — 76856 US EXAM PELVIC COMPLETE: CPT

## 2024-10-31 PROCEDURE — 93970 EXTREMITY STUDY: CPT

## 2024-10-31 PROCEDURE — 82948 REAGENT STRIP/BLOOD GLUCOSE: CPT

## 2024-10-31 PROCEDURE — 93970 EXTREMITY STUDY: CPT | Performed by: SURGERY

## 2024-10-31 PROCEDURE — 80048 BASIC METABOLIC PNL TOTAL CA: CPT

## 2024-10-31 RX ADMIN — PRAVASTATIN SODIUM 40 MG: 40 TABLET ORAL at 16:18

## 2024-10-31 RX ADMIN — ARIPIPRAZOLE 2 MG: 2 TABLET ORAL at 23:32

## 2024-10-31 RX ADMIN — INSULIN LISPRO 1 UNITS: 100 INJECTION, SOLUTION INTRAVENOUS; SUBCUTANEOUS at 06:22

## 2024-10-31 RX ADMIN — POLYETHYLENE GLYCOL 3350 17 G: 17 POWDER, FOR SOLUTION ORAL at 08:08

## 2024-10-31 RX ADMIN — INSULIN LISPRO 1 UNITS: 100 INJECTION, SOLUTION INTRAVENOUS; SUBCUTANEOUS at 17:57

## 2024-10-31 RX ADMIN — INSULIN HUMAN 4 UNITS: 100 INJECTION, SOLUTION PARENTERAL at 17:57

## 2024-10-31 RX ADMIN — FERROUS SULFATE 300 MG: 300 SOLUTION ORAL at 08:08

## 2024-10-31 RX ADMIN — BACLOFEN 10 MG: 10 TABLET ORAL at 23:30

## 2024-10-31 RX ADMIN — ACETAMINOPHEN 650 MG: 325 TABLET ORAL at 16:18

## 2024-10-31 RX ADMIN — VALPROIC ACID 125 MG: 250 SOLUTION ORAL at 23:35

## 2024-10-31 RX ADMIN — CARBIDOPA AND LEVODOPA 1 TABLET: 25; 100 TABLET ORAL at 08:08

## 2024-10-31 RX ADMIN — CARBIDOPA AND LEVODOPA 1 TABLET: 25; 100 TABLET ORAL at 11:28

## 2024-10-31 RX ADMIN — OXYBUTYNIN CHLORIDE 5 MG: 5 TABLET ORAL at 16:18

## 2024-10-31 RX ADMIN — BACLOFEN 10 MG: 10 TABLET ORAL at 08:09

## 2024-10-31 RX ADMIN — OXYBUTYNIN CHLORIDE 5 MG: 5 TABLET ORAL at 23:30

## 2024-10-31 RX ADMIN — SENNOSIDES AND DOCUSATE SODIUM 1 TABLET: 50; 8.6 TABLET ORAL at 23:31

## 2024-10-31 RX ADMIN — VALPROIC ACID 125 MG: 250 SOLUTION ORAL at 08:08

## 2024-10-31 RX ADMIN — LORAZEPAM 0.5 MG: 0.5 TABLET ORAL at 23:30

## 2024-10-31 RX ADMIN — INSULIN HUMAN 4 UNITS: 100 INJECTION, SOLUTION PARENTERAL at 00:49

## 2024-10-31 RX ADMIN — ENOXAPARIN SODIUM 40 MG: 40 INJECTION SUBCUTANEOUS at 08:09

## 2024-10-31 RX ADMIN — CARBIDOPA AND LEVODOPA 1 TABLET: 25; 100 TABLET ORAL at 17:56

## 2024-10-31 RX ADMIN — LEVOTHYROXINE SODIUM 25 MCG: 25 TABLET ORAL at 06:21

## 2024-10-31 RX ADMIN — OXYBUTYNIN CHLORIDE 5 MG: 5 TABLET ORAL at 08:09

## 2024-10-31 RX ADMIN — INSULIN LISPRO 2 UNITS: 100 INJECTION, SOLUTION INTRAVENOUS; SUBCUTANEOUS at 00:43

## 2024-10-31 RX ADMIN — INSULIN LISPRO 2 UNITS: 100 INJECTION, SOLUTION INTRAVENOUS; SUBCUTANEOUS at 14:34

## 2024-10-31 RX ADMIN — CITALOPRAM HYDROBROMIDE 20 MG: 20 TABLET, FILM COATED ORAL at 08:09

## 2024-10-31 RX ADMIN — CARBIDOPA AND LEVODOPA 1 TABLET: 25; 100 TABLET ORAL at 23:31

## 2024-10-31 RX ADMIN — BACLOFEN 10 MG: 10 TABLET ORAL at 16:18

## 2024-10-31 NOTE — PROGRESS NOTES
Progress Note - Infectious Disease   Name: Terrie Alicea 64 y.o. female I MRN: [id number]  Unit/Bed#: S -01 I Date of Admission: 10/18/2024   Date of Service: 10/31/2024 I Hospital Day: 13    Assessment & Plan  Fever of unknown origin  Patient persistently febrile since ~hospital day 3.  Blood cultures negative, multiple sets.  UA initially with TNTC WBC, Morganella on culture.  COVID/flu/RSV and Rp2 panel negative.  No LFT abnormalities.  CTAP with multifocal pulmonary infiltrates, no abdominal pathology and PEG site clean.  She completed 7 day course of ceftriaxone for pneumonia vs UTI.   Follow up chest XR showed improved infiltrates 10/28.  CTAP with contrast repeated 10/29 - largely resolving pulmonary opacities with call of posterior thigh muscle enhancement.  Full skin exam unremarkable, including posterior thigh - no fluctuance or erythema on exam.  Difficult to assess oropharynx - she has some whitish plaques on her tongue but may be from dry mouth.  CT of sinuses did not show pathology.  Lower extremity dopplers negative for DVT and lung VQ scan low probability of PE.  Resides in a care facility, so shouldn't have any unusual exposures.  Urine Legionella negative.  Normal bowel movements.  Tolerating tube feeds.  She is receiving all home medications, aside from heparin, which was changed to enoxaparin on 10/29.  Procalcitonin down-trended.  WBC continues to downtrend since 10/28.  Fortunately, she remains hemodynamically stable, off antibiotics, without a rising WBC count.  - Let's continue to monitor off antibiotics  - Will consider repeat CT of left thigh in 48 hrs to assess developing collection if febrile  - If remains afebrile for 24 hrs, OK for discharge  UTI (urinary tract infection)  Urine culture growing Morganella, TNTC WBC.  No indwelling catheter.  - Completed ceftriaxone course  Cerebral palsy (HCC)  Continue home medications  Bipolar disorder (HCC)  Continue home  medications  Diabetes mellitus type 2, insulin dependent (HCC)  Lab Results   Component Value Date    HGBA1C 6.2 (H) 2024     Recent Labs     10/30/24  1217 10/30/24  1742 10/31/24  0031 10/31/24  0457   POCGLU 278* 228* 242* 207*   This is a risk factor for infection.  Well-controlled in this patient.  - Maintain euglycemia to improve WBC function    Parkinson's disease (HCC)  Continue home medication.  Hypothyroidism  TFT's were normal earlier in admission.      I have discussed the above management plan in detail with the primary service, Dr Coleman, who agrees with the plan to monitor off antibiotics.  We will continue to follow along with you.    I have performed an extensive review of the medical records in Epic including review of the notes, radiographs, and laboratory results     Antibiotics:  Off antibiotics day 3    24 Hour Events:  No fevers overnight.    Subjective:  Patient non-verbal.  Saw with bedside RN.  Examined left posterior thigh area - no pain with palpation.      Objective:  Vitals:  Temp:  [98 °F (36.7 °C)-101.5 °F (38.6 °C)] 98 °F (36.7 °C)  HR:  [] 87  Resp:  [18] 18  BP: ()/(69-83) 112/74  SpO2:  [94 %-97 %] 97 %  Temp (24hrs), Av.5 °F (37.5 °C), Min:98 °F (36.7 °C), Max:101.5 °F (38.6 °C)  Current: Temperature: 98 °F (36.7 °C)      Physical Exam:   General: Appears chronically ill, in no apparent distress  HEENT:  Normocephalic, sclera anicteric  Heart:  RRR, no murmurs, rubs, or gallops  Lungs:  Clear to auscultation bilaterally  Abdomen:  Soft, nontender, nondistended, normal bowel sounds  :  No suprapubic tenderness  Extremities:  No cyanosis or edema  Skin:  No rashes, lesions.  No tenderness, fluctuance, or erythema of posterior thigh  Neuro:  Awake, alert, tracking to voice      Labs:   All pertinent labs and imaging studies were personally reviewed  Results from last 7 days   Lab Units 10/31/24  0453 10/30/24  0734 10/29/24  0516   WBC Thousand/uL 9.50 10.38*  11.63*   HEMOGLOBIN g/dL 8.3* 8.8* 10.2*   PLATELETS Thousands/uL 258 264 244     Results from last 7 days   Lab Units 10/31/24  0453 10/30/24  0855 10/30/24  0734 10/29/24  0516 10/27/24  0424 10/26/24  0424   SODIUM mmol/L 137  --  130* 137   < > 143   POTASSIUM mmol/L 4.7   < > 5.8* 4.9   < > 5.4*   CHLORIDE mmol/L 107  --  101 104   < > 114*   CO2 mmol/L 25  --  25 25   < > 23   BUN mg/dL 29*  --  27* 42*   < > 44*   CREATININE mg/dL 0.87  --  1.01 1.12   < > 1.32*   EGFR ml/min/1.73sq m 71  --  59 52   < > 43   CALCIUM mg/dL 8.3*  --  8.3* 8.6   < > 8.0*   AST U/L  --   --  36  --   --  37   ALT U/L  --   --  18  --   --  14   ALK PHOS U/L  --   --  43  --   --  60    < > = values in this interval not displayed.     Results from last 7 days   Lab Units 10/29/24  0516   PROCALCITONIN ng/ml 0.28*     Results from last 7 days   Lab Units 10/29/24  0516   CRP mg/L 7.4*     Results from last 7 days   Lab Units 10/29/24  0516   FERRITIN ng/mL 447*             Microbiology:  Results from last 7 days   Lab Units 10/29/24  1148 10/28/24  1432   BLOOD CULTURE  No Growth at 24 hrs.  No Growth at 24 hrs.  --    LEGIONELLA URINARY ANTIGEN   --  Negative       Imaging:        Leander Dodd MD  [facility]

## 2024-10-31 NOTE — ASSESSMENT & PLAN NOTE
Lab Results   Component Value Date    HGBA1C 6.2 (H) 08/13/2024     Recent Labs     10/30/24  1217 10/30/24  1742 10/31/24  0031 10/31/24  0457   POCGLU 278* 228* 242* 207*   This is a risk factor for infection.  Well-controlled in this patient.  - Maintain euglycemia to improve WBC function

## 2024-10-31 NOTE — ASSESSMENT & PLAN NOTE
Lab Results   Component Value Date    HGBA1C 6.2 (H) 08/13/2024       Recent Labs     10/30/24  1217 10/30/24  1742 10/31/24  0031 10/31/24  0457   POCGLU 278* 228* 242* 207*       Blood Sugar Average: Last 72 hrs:  (P) [id number]  Home regimen: regular insulin 4 units twice daily at 6PM and midnight  10/22 Glucose levels elevated overnight, requiring insulin gtt    Plan  Continue PTA regimen of regular insulin 4 units twice daily (6PM and midnight)  Nutrition on board, appreciate recs  Glucerna available again as of today. Will switch tube feeds back to Glucerna   If Glucerna is not available, switch to vital 1.2 AF @ 80 mL/hr x12 hours per nutrition recs.  Continue sliding scale Algorithm 1 q6h  Monitor blood glucose  Goal -180 while admitted, adjust insulin regimen as appropriate

## 2024-10-31 NOTE — NUTRITION
Glucerna 1.2 again in stock      RD adjusted to Glucerna 1.2 @ 80 ml/hr continuous nocturnal. Provides 1152 kcal, 58 g protein, 773 ml free water, 960 ml total volume.     Current flush order per physician 250 q 4 hours.

## 2024-10-31 NOTE — PROGRESS NOTES
Progress Note - Hospitalist   Name: Terrie Alicea 64 y.o. female I MRN: [id number]  Unit/Bed#: S -01 I Date of Admission: 10/18/2024   Date of Service: 10/31/2024 I Hospital Day: 13    Assessment & Plan  Sepsis (HCC)  10/21 Met SIRS criteria with fever, tachycardia  10/21 CXR - Faint inferior perihilar patchiness with subtle obscuration of the right cardiomediastinal silhouette could represent findings of aspiration in the appropriate clinical context.  Urinalysis - innumerable leukocytes and moderate bacteria on microscopy  Urine culture growing Morganella (susceptible to ceftriaxone)  10/22 Blood cultures negative to date  Elevated procalcitonin, bandemia  CT CAP - Multilobar pneumonia. No acute findings in the abdomen or pelvis. Prominent rectal vault stool without bowel obstruction.  Patient continued to have recurrent fevers, leukocytosis, despite antibiotic management. Further work-up of fever as below:  CT temporal bones - no evidence of mastoiditis  10/28 CXR showed improved bilateral perihilar consolidations suggesting improved pneumonia  RP2 negative  Legionella urine antigen negative  LE Dopplers show no evidence of acute/chronic DVT  CT CAP showed decreased left greater than right bibasilar opacities, likely representing combination of atelectasis and pneumonia. Thickening on left proximal posterior thigh muscle with ill-defined linear hyperenhancement likely infectious vs inflammatory. Small nodular rim-enhancing focus in lower uterus/cervix possible small fibroid  10/29 blood cx pending  UE Dopplers pending    Fever etiology unclear at this time. More likely drug-induced than infectious (leukocytosis improved) and malignancy (workup overall unremarkable).   Did reach out to heme/onc Dr. Judge via [employer] secure chat. After their review, less suspicious that etiology of her persistent fevers are from occult malignancy/tumor. Her initial hypercalcemia improved. Workup for myeloma was unremarkable.  Recent CT CAP showed no evidence of lymphadenopathy or occult malignancy.    Plan  Infectious disease on board, appreciate recommendations  Completed 7-day course of Ceftriaxone on 10/28  Switched IV Pantoprazole to oral pantoprazole  Switched DVT ppx from heparin to lovenox  Avoid nonessential meds  Last fever noted on 10/30/24 at 1525  If patient continue to be afebrile today, plan for possible discharge back to facility tomorrow  If continues to have recurrent fevers, will consider repeating CT of left thigh tomorrow to assess developing collection. Possible pelvic ultrasound to further assess nodule in the lower uterus/cervix  Monitor temp  Trend WBCs    UTI (urinary tract infection)  See plan for sepsis  Diabetes mellitus type 2, insulin dependent ([facility])  Lab Results   Component Value Date    HGBA1C 6.2 (H) 08/13/2024       Recent Labs     10/30/24  1217 10/30/24  1742 10/31/24  0031 10/31/24  0457   POCGLU 278* 228* 242* 207*       Blood Sugar Average: Last 72 hrs:  (P) [id number]  Home regimen: regular insulin 4 units twice daily at 6PM and midnight  10/22 Glucose levels elevated overnight, requiring insulin gtt    Plan  Continue PTA regimen of regular insulin 4 units twice daily (6PM and midnight)  Nutrition on board, appreciate recs  Glucerna available again as of today. Will switch tube feeds back to Glucerna   If Glucerna is not available, switch to vital 1.2 AF @ 80 mL/hr x12 hours per nutrition recs.  Continue sliding scale Algorithm 1 q6h  Monitor blood glucose  Goal -180 while admitted, adjust insulin regimen as appropriate  Cerebral palsy (HCC)  At baseline is non verbal , totally dependent on care, resides in intermediate care facility  Continue PTA baclofen TID  Bipolar disorder (HCC)  Continue PTA Depakene and Abilify  Iron deficiency anemia  Recent Labs     10/29/24  0516 10/30/24  0734 10/31/24  0453   HGB 10.2* 8.8* 8.3*   Baseline: 9-11, currently hemoglobin is baseline  Ordered  iron studies  Daily CBC  Continue PTA ferrous sulfate  Transfuse if hemoglobin less than 7  Hypothyroidism  Continue PTA levothyroxine  TSH and T4 within normal limits  Parkinson's disease (HCC)  On Sinemet CR  mg 2 tablets BID at home  Switched to Sinemet immediate release  mg 1 tablet 4 times daily while admitted    VTE Pharmacologic Prophylaxis: VTE Score: 4 Moderate Risk (Score 3-4) - Pharmacological DVT Prophylaxis Ordered: enoxaparin (Lovenox).    Mobility:   Basic Mobility Inpatient Raw Score: 6  -HLM Goal: 2: Bed activities/Dependent transfer  JH-HLM Achieved: 2: Bed activities/Dependent transfer  JH-HLM Goal achieved. Continue to encourage appropriate mobility.    Patient Centered Rounds: I performed bedside rounds with nursing staff today.   Discussions with Specialists or Other Care Team Provider: ID    Education and Discussions with Family / Patient: Updated  (caregiver) via phone.    Current Length of Stay: 13 day(s)  Current Patient Status: Inpatient   Certification Statement: The patient will continue to require additional inpatient hospital stay due to fevers  Discharge Plan: Anticipate discharge in 24-48 hrs to prior assisted or independent living facility.    Code Status: Level 1 - Full Code    Subjective   Patient had no acute events overnight. Evaluated and seen at bedside. Patient does not appear to be in acute distress and is at baseline mental status. Currently afebrile, last elevated temp recorded was 100.7 F on 10/30 at 1525.     Objective :  Temp:  [98.5 °F (36.9 °C)-101.5 °F (38.6 °C)] 98.8 °F (37.1 °C)  HR:  [] 99  BP: ()/(69-83) 97/69  Resp:  [18-20] 18  SpO2:  [94 %-97 %] 94 %  O2 Device: None (Room air)    Body mass index is 17.81 kg/m².     Input and Output Summary (last 24 hours):     Intake/Output Summary (Last 24 hours) at 10/31/2024 0656  Last data filed at 10/31/2024 0601  Gross per 24 hour   Intake 1253.75 ml   Output --   Net 1253.75 ml        Physical Exam  Vitals reviewed.   Constitutional:       General: She is not in acute distress.     Appearance: She is well-developed.   HENT:      Head: Normocephalic and atraumatic.   Eyes:      Conjunctiva/sclera: Conjunctivae normal.   Cardiovascular:      Rate and Rhythm: Normal rate and regular rhythm.      Heart sounds: Normal heart sounds. No murmur heard.  Pulmonary:      Effort: Pulmonary effort is normal. No respiratory distress.      Breath sounds: Normal breath sounds.   Abdominal:      General: There is no distension.      Palpations: Abdomen is soft.      Tenderness: There is no abdominal tenderness.   Musculoskeletal:         General: No swelling or tenderness.      Cervical back: Neck supple.   Skin:     General: Skin is warm and dry.      Capillary Refill: Capillary refill takes less than 2 seconds.      Findings: No bruising or lesion.   Neurological:      Mental Status: She is alert. Mental status is at baseline.       Lines/Drains:        Lab Results: I have reviewed the following results:   Results from last 7 days   Lab Units 10/31/24  0453 10/30/24  0734 10/29/24  0516   WBC Thousand/uL 9.50   < > 11.63*   HEMOGLOBIN g/dL 8.3*   < > 10.2*   HEMATOCRIT % 26.5*   < > 31.9*   PLATELETS Thousands/uL 258   < > 244   BANDS PCT %  --   --  8   SEGS PCT % 67   < >  --    LYMPHO PCT % 23   < > 27   MONO PCT % 6   < > 5   EOS PCT % 2   < > 1    < > = values in this interval not displayed.     Results from last 7 days   Lab Units 10/31/24  0453 10/30/24  0855 10/30/24  0734   SODIUM mmol/L 137  --  130*   POTASSIUM mmol/L 4.7   < > 5.8*   CHLORIDE mmol/L 107  --  101   CO2 mmol/L 25  --  25   BUN mg/dL 29*  --  27*   CREATININE mg/dL 0.87  --  1.01   ANION GAP mmol/L 5  --  4   CALCIUM mg/dL 8.3*  --  8.3*   ALBUMIN g/dL  --   --  3.4*   TOTAL BILIRUBIN mg/dL  --   --  0.34   ALK PHOS U/L  --   --  43   ALT U/L  --   --  18   AST U/L  --   --  36   GLUCOSE RANDOM mg/dL 220*  --  318*    < > =  values in this interval not displayed.         Results from last 7 days   Lab Units 10/31/24  0457 10/31/24  0031 10/30/24  1742 10/30/24  1217 10/30/24  0645 10/30/24  0006 10/29/24  1807 10/29/24  1230 10/29/24  0616 10/29/24  0004 10/28/24  1805 10/28/24  1243   POC GLUCOSE mg/dl 207* 242* 228* 278* 366* 265* 173* 269* 220* 205* 161* 220*         Results from last 7 days   Lab Units 10/29/24  0516 10/24/24  0802   PROCALCITONIN ng/ml 0.28* 3.26*       Recent Cultures (last 7 days):   Results from last 7 days   Lab Units 10/29/24  1148 10/28/24  1432   BLOOD CULTURE  No Growth at 24 hrs.  No Growth at 24 hrs.  --    LEGIONELLA URINARY ANTIGEN   --  Negative             Last 24 Hours Medication List:     Current Facility-Administered Medications:     acetaminophen (TYLENOL) tablet 650 mg, Q6H PRN    ARIPiprazole (ABILIFY) tablet 2 mg, HS    [Held by provider] ascorbic acid (VITAMIN C) tablet 1,000 mg, Daily    baclofen tablet 10 mg, TID    carbidopa-levodopa (SINEMET)  mg per tablet 1 tablet, 4x Daily    citalopram (CeleXA) tablet 20 mg, Daily    enoxaparin (LOVENOX) subcutaneous injection 40 mg, Q24H ROSEMARY    Ferrous Sulfate oral syrup 300 mg, Every Other Day    influenza vaccine, recombinant (PF) (Flublok) IM injection 0.5 mL, Once    insulin lispro (HumALOG/ADMELOG) 100 units/mL subcutaneous injection 1-5 Units, Q6H ROSEMARY **AND** Fingerstick Glucose (POCT), Q6H    insulin regular (HumuLIN R,NovoLIN R) injection 4 Units, BID    Ketotifen Fumarate (ZADITOR) 0.035 % ophthalmic solution 1 drop, BID PRN    levothyroxine tablet 25 mcg, Early Morning    LORazepam (ATIVAN) tablet 0.5 mg, HS    midodrine (PROAMATINE) tablet 5 mg, TID PRN    oxybutynin (DITROPAN) tablet 5 mg, TID    [Held by provider] pantoprazole (PROTONIX) EC tablet 20 mg, Early Morning    polyethylene glycol (MIRALAX) packet 17 g, Daily    pravastatin (PRAVACHOL) tablet 40 mg, Daily With Dinner    senna-docusate sodium (SENOKOT S) 8.6-50 mg per  tablet 1 tablet, HS    traZODone (DESYREL) tablet 100 mg, HS PRN    valproic acid (DEPAKENE) oral soln 125 mg, BID    Administrative Statements   Today, Patient Was Seen By: Melonie Coleman MD      **Please Note: This note may have been constructed using a voice recognition system.**

## 2024-10-31 NOTE — PLAN OF CARE
Problem: Prexisting or High Potential for Compromised Skin Integrity  Goal: Skin integrity is maintained or improved  Description: INTERVENTIONS:  - Identify patients at risk for skin breakdown  - Assess and monitor skin integrity  - Assess and monitor nutrition and hydration status  - Monitor labs   - Assess for incontinence   - Turn and reposition patient  - Assist with mobility/ambulation  - Relieve pressure over bony prominences  - Avoid friction and shearing  - Provide appropriate hygiene as needed including keeping skin clean and dry  - Evaluate need for skin moisturizer/barrier cream  - Collaborate with interdisciplinary team   - Patient/family teaching  - Consider wound care consult   Outcome: Progressing     Problem: PAIN - ADULT  Goal: Verbalizes/displays adequate comfort level or baseline comfort level  Description: Interventions:  - Encourage patient to monitor pain and request assistance  - Assess pain using appropriate pain scale  - Administer analgesics based on type and severity of pain and evaluate response  - Implement non-pharmacological measures as appropriate and evaluate response  - Consider cultural and social influences on pain and pain management  - Notify physician/advanced practitioner if interventions unsuccessful or patient reports new pain  Outcome: Progressing     Problem: INFECTION - ADULT  Goal: Absence or prevention of progression during hospitalization  Description: INTERVENTIONS:  - Assess and monitor for signs and symptoms of infection  - Monitor lab/diagnostic results  - Monitor all insertion sites, i.e. indwelling lines, tubes, and drains  - Monitor endotracheal if appropriate and nasal secretions for changes in amount and color  - [place] appropriate cooling/warming therapies per order  - Administer medications as ordered  - Instruct and encourage patient and family to use good hand hygiene technique  - Identify and instruct in appropriate isolation precautions for  identified infection/condition  Outcome: Progressing  Goal: Absence of fever/infection during neutropenic period  Description: INTERVENTIONS:  - Monitor WBC    Outcome: Progressing     Problem: SAFETY ADULT  Goal: Patient will remain free of falls  Description: INTERVENTIONS:  - Educate patient/family on patient safety including physical limitations  - Instruct patient to call for assistance with activity   - Consult OT/PT to assist with strengthening/mobility   - Keep Call bell within reach  - Keep bed low and locked with side rails adjusted as appropriate  - Keep care items and personal belongings within reach  - Initiate and maintain comfort rounds  - Make Fall Risk Sign visible to staff  - Offer Toileting every 2 Hours, in advance of need  - Initiate/Maintain bed/chair alarm  - Obtain necessary fall risk management equipment: yellow bracelet/socks  - Apply yellow socks and bracelet for high fall risk patients  - Consider moving patient to room near nurses station  Outcome: Progressing  Goal: Maintain or return to baseline ADL function  Description: INTERVENTIONS:  -  Assess patient's ability to carry out ADLs; assess patient's baseline for ADL function and identify physical deficits which impact ability to perform ADLs (bathing, care of mouth/teeth, toileting, grooming, dressing, etc.)  - Assess/evaluate cause of self-care deficits   - Assess range of motion  - Assess patient's mobility; develop plan if impaired  - Assess patient's need for assistive devices and provide as appropriate  - Encourage maximum independence but intervene and supervise when necessary  - Involve family in performance of ADLs  - Assess for home care needs following discharge   - Consider OT consult to assist with ADL evaluation and planning for discharge  - Provide patient education as appropriate  Outcome: Progressing  Goal: Maintains/Returns to pre admission functional level  Description: INTERVENTIONS:  - Perform AM-PAC 6 Click Basic  Mobility/ Daily Activity assessment daily.  - Set and communicate daily mobility goal to care team and patient/family/caregiver.   - Collaborate with rehabilitation services on mobility goals if consulted  - Perform Range of Motion 3 times a day.  - Reposition patient every 2hours.  - Dangle patient 3 times a day  - Stand patient 3 times a day  - Ambulate patient 3 times a day  - Out of bed to chair 3 times a day   - Out of bed for meals 3 times a day  - Out of bed for toileting  - Record patient progress and toleration of activity level   Outcome: Progressing     Problem: DISCHARGE PLANNING  Goal: Discharge to home or other facility with appropriate resources  Description: INTERVENTIONS:  - Identify barriers to discharge w/patient and caregiver  - Arrange for needed discharge resources and transportation as appropriate  - Identify discharge learning needs (meds, wound care, etc.)  - Arrange for interpretive services to assist at discharge as needed  - Refer to Case Management Department for coordinating discharge planning if the patient needs post-hospital services based on physician/advanced practitioner order or complex needs related to functional status, cognitive ability, or social support system  Outcome: Progressing     Problem: Knowledge Deficit  Goal: Patient/family/caregiver demonstrates understanding of disease process, treatment plan, medications, and discharge instructions  Description: Complete learning assessment and assess knowledge base.  Interventions:  - Provide teaching at level of understanding  - Provide teaching via preferred learning methods  Outcome: Progressing     Problem: Nutrition/Hydration-ADULT  Goal: Nutrient/Hydration intake appropriate for improving, restoring or maintaining nutritional needs  Description: Monitor and assess patient's nutrition/hydration status for malnutrition. Collaborate with interdisciplinary team and initiate plan and interventions as ordered.  Monitor  patient's weight and dietary intake as ordered or per policy. Utilize nutrition screening tool and intervene as necessary. Determine patient's food preferences and provide high-protein, high-caloric foods as appropriate.     INTERVENTIONS:  - Monitor oral intake, urinary output, labs, and treatment plans  - Assess nutrition and hydration status and recommend course of action  - Evaluate amount of meals eaten  - Assist patient with eating if necessary   - Allow adequate time for meals  - Recommend/ encourage appropriate diets, oral nutritional supplements, and vitamin/mineral supplements  - Order, calculate, and assess calorie counts as needed  - Recommend, monitor, and adjust tube feedings and TPN/PPN based on assessed needs  - Assess need for intravenous fluids  - Provide specific nutrition/hydration education as appropriate  - Include patient/family/caregiver in decisions related to nutrition  Outcome: Progressing     Problem: Potential for Falls  Goal: Patient will remain free of falls  Description: INTERVENTIONS:  - Educate patient/family on patient safety including physical limitations  - Instruct patient to call for assistance with activity   - Consult OT/PT to assist with strengthening/mobility   - Keep Call bell within reach  - Keep bed low and locked with side rails adjusted as appropriate  - Keep care items and personal belongings within reach  - Initiate and maintain comfort rounds  - Make Fall Risk Sign visible to staff  - Offer Toileting every 2 Hours, in advance of need  - Initiate/Maintain bed/chair alarm  - Obtain necessary fall risk management equipment: yellow bracelet/socks  - Apply yellow socks and bracelet for high fall risk patients  - Consider moving patient to room near nurses station  Outcome: Progressing

## 2024-10-31 NOTE — ASSESSMENT & PLAN NOTE
10/21 Met SIRS criteria with fever, tachycardia  10/21 CXR - Faint inferior perihilar patchiness with subtle obscuration of the right cardiomediastinal silhouette could represent findings of aspiration in the appropriate clinical context.  Urinalysis - innumerable leukocytes and moderate bacteria on microscopy  Urine culture growing Morganella (susceptible to ceftriaxone)  10/22 Blood cultures negative to date  Elevated procalcitonin, bandemia  CT CAP - Multilobar pneumonia. No acute findings in the abdomen or pelvis. Prominent rectal vault stool without bowel obstruction.  Patient continued to have recurrent fevers, leukocytosis, despite antibiotic management. Further work-up of fever as below:  CT temporal bones - no evidence of mastoiditis  10/28 CXR showed improved bilateral perihilar consolidations suggesting improved pneumonia  RP2 negative  Legionella urine antigen negative  LE Dopplers show no evidence of acute/chronic DVT  CT CAP showed decreased left greater than right bibasilar opacities, likely representing combination of atelectasis and pneumonia. Thickening on left proximal posterior thigh muscle with ill-defined linear hyperenhancement likely infectious vs inflammatory. Small nodular rim-enhancing focus in lower uterus/cervix possible small fibroid  10/29 blood cx pending  UE Dopplers pending    Fever etiology unclear at this time. More likely drug-induced than infectious (leukocytosis improved) and malignancy (workup overall unremarkable).   Did reach out to heme/onc Dr. Judge via [employer] secure chat. After their review, less suspicious that etiology of her persistent fevers are from occult malignancy/tumor. Her initial hypercalcemia improved. Workup for myeloma was unremarkable. Recent CT CAP showed no evidence of lymphadenopathy or occult malignancy.    Plan  Infectious disease on board, appreciate recommendations  Completed 7-day course of Ceftriaxone on 10/28  Switched IV Pantoprazole to oral  pantoprazole  Switched DVT ppx from heparin to lovenox  Avoid nonessential meds  Last fever noted on 10/30/24 at 1525  If patient continue to be afebrile today, plan for possible discharge back to facility tomorrow  If continues to have recurrent fevers, will consider repeating CT of left thigh tomorrow to assess developing collection. Possible pelvic ultrasound to further assess nodule in the lower uterus/cervix  Monitor temp  Trend WBCs

## 2024-10-31 NOTE — ASSESSMENT & PLAN NOTE
Recent Labs     10/29/24  0516 10/30/24  0734 10/31/24  0453   HGB 10.2* 8.8* 8.3*   Baseline: 9-11, currently hemoglobin is baseline  Ordered iron studies  Daily CBC  Continue PTA ferrous sulfate  Transfuse if hemoglobin less than 7

## 2024-10-31 NOTE — ASSESSMENT & PLAN NOTE
Patient persistently febrile since ~hospital day 3.  Blood cultures negative, multiple sets.  UA initially with TNTC WBC, Morganella on culture.  COVID/flu/RSV and Rp2 panel negative.  No LFT abnormalities.  CTAP with multifocal pulmonary infiltrates, no abdominal pathology and PEG site clean.  She completed 7 day course of ceftriaxone for pneumonia vs UTI.   Follow up chest XR showed improved infiltrates 10/28.  CTAP with contrast repeated 10/29 - largely resolving pulmonary opacities with call of posterior thigh muscle enhancement.  Full skin exam unremarkable, including posterior thigh - no fluctuance or erythema on exam.  Difficult to assess oropharynx - she has some whitish plaques on her tongue but may be from dry mouth.  CT of sinuses did not show pathology.  Lower extremity dopplers negative for DVT and lung VQ scan low probability of PE.  Resides in a care facility, so shouldn't have any unusual exposures.  Urine Legionella negative.  Normal bowel movements.  Tolerating tube feeds.  She is receiving all home medications, aside from heparin, which was changed to enoxaparin on 10/29.  Procalcitonin down-trended.  WBC continues to downtrend since 10/28.  Fortunately, she remains hemodynamically stable, off antibiotics, without a rising WBC count.  - Let's continue to monitor off antibiotics  - Will consider repeat CT of left thigh in 48 hrs to assess developing collection if febrile  - If remains afebrile for 24 hrs, OK for discharge

## 2024-11-01 ENCOUNTER — APPOINTMENT (INPATIENT)
Dept: CT IMAGING | Facility: HOSPITAL | Age: 64
DRG: 640 | End: 2024-11-01
Payer: MEDICARE

## 2024-11-01 LAB
ALBUMIN SERPL BCG-MCNC: 3.3 G/DL (ref 3.5–5)
ALP SERPL-CCNC: 44 U/L (ref 34–104)
ALT SERPL W P-5'-P-CCNC: 14 U/L (ref 7–52)
ANION GAP SERPL CALCULATED.3IONS-SCNC: 7 MMOL/L (ref 4–13)
AST SERPL W P-5'-P-CCNC: 28 U/L (ref 13–39)
BILIRUB SERPL-MCNC: 0.21 MG/DL (ref 0.2–1)
BUN SERPL-MCNC: 30 MG/DL (ref 5–25)
CALCIUM ALBUM COR SERPL-MCNC: 10.4 MG/DL (ref 8.3–10.1)
CALCIUM SERPL-MCNC: 9.8 MG/DL (ref 8.4–10.2)
CHLORIDE SERPL-SCNC: 102 MMOL/L (ref 96–108)
CO2 SERPL-SCNC: 29 MMOL/L (ref 21–32)
CREAT SERPL-MCNC: 1.02 MG/DL (ref 0.6–1.3)
DME PARACHUTE DELIVERY DATE ACTUAL: NORMAL
DME PARACHUTE DELIVERY DATE REQUESTED: NORMAL
DME PARACHUTE ITEM DESCRIPTION: NORMAL
DME PARACHUTE ORDER STATUS: NORMAL
DME PARACHUTE SUPPLIER NAME: NORMAL
DME PARACHUTE SUPPLIER PHONE: NORMAL
ERYTHROCYTE [DISTWIDTH] IN BLOOD BY AUTOMATED COUNT: 14.5 % (ref 11.6–15.1)
GFR SERPL CREATININE-BSD FRML MDRD: 59 ML/MIN/1.73SQ M
GLUCOSE SERPL-MCNC: 132 MG/DL (ref 65–140)
GLUCOSE SERPL-MCNC: 164 MG/DL (ref 65–140)
GLUCOSE SERPL-MCNC: 174 MG/DL (ref 65–140)
GLUCOSE SERPL-MCNC: 200 MG/DL (ref 65–140)
GLUCOSE SERPL-MCNC: 226 MG/DL (ref 65–140)
HCT VFR BLD AUTO: 27 % (ref 34.8–46.1)
HGB BLD-MCNC: 8.6 G/DL (ref 11.5–15.4)
MCH RBC QN AUTO: 33.6 PG (ref 26.8–34.3)
MCHC RBC AUTO-ENTMCNC: 31.9 G/DL (ref 31.4–37.4)
MCV RBC AUTO: 106 FL (ref 82–98)
PLATELET # BLD AUTO: 282 THOUSANDS/UL (ref 149–390)
PMV BLD AUTO: 11.4 FL (ref 8.9–12.7)
POTASSIUM SERPL-SCNC: 4.7 MMOL/L (ref 3.5–5.3)
PROT SERPL-MCNC: 6.5 G/DL (ref 6.4–8.4)
RBC # BLD AUTO: 2.56 MILLION/UL (ref 3.81–5.12)
SODIUM SERPL-SCNC: 138 MMOL/L (ref 135–147)
WBC # BLD AUTO: 11.5 THOUSAND/UL (ref 4.31–10.16)

## 2024-11-01 PROCEDURE — 82948 REAGENT STRIP/BLOOD GLUCOSE: CPT

## 2024-11-01 PROCEDURE — 85027 COMPLETE CBC AUTOMATED: CPT

## 2024-11-01 PROCEDURE — 99232 SBSQ HOSP IP/OBS MODERATE 35: CPT | Performed by: STUDENT IN AN ORGANIZED HEALTH CARE EDUCATION/TRAINING PROGRAM

## 2024-11-01 PROCEDURE — 73701 CT LOWER EXTREMITY W/DYE: CPT

## 2024-11-01 PROCEDURE — 99233 SBSQ HOSP IP/OBS HIGH 50: CPT | Performed by: INTERNAL MEDICINE

## 2024-11-01 PROCEDURE — 80053 COMPREHEN METABOLIC PANEL: CPT

## 2024-11-01 RX ORDER — BISACODYL 5 MG/1
10 TABLET, DELAYED RELEASE ORAL ONCE
Status: COMPLETED | OUTPATIENT
Start: 2024-11-01 | End: 2024-11-01

## 2024-11-01 RX ORDER — AMOXICILLIN 250 MG
2 CAPSULE ORAL 2 TIMES DAILY
Status: DISCONTINUED | OUTPATIENT
Start: 2024-11-01 | End: 2024-11-03

## 2024-11-01 RX ORDER — SODIUM CHLORIDE 9 MG/ML
100 INJECTION, SOLUTION INTRAVENOUS CONTINUOUS
Status: DISPENSED | OUTPATIENT
Start: 2024-11-01 | End: 2024-11-02

## 2024-11-01 RX ADMIN — LORAZEPAM 0.5 MG: 0.5 TABLET ORAL at 22:39

## 2024-11-01 RX ADMIN — POLYETHYLENE GLYCOL 3350 17 G: 17 POWDER, FOR SOLUTION ORAL at 10:25

## 2024-11-01 RX ADMIN — OXYBUTYNIN CHLORIDE 5 MG: 5 TABLET ORAL at 22:39

## 2024-11-01 RX ADMIN — LEVOTHYROXINE SODIUM 25 MCG: 25 TABLET ORAL at 07:49

## 2024-11-01 RX ADMIN — BACLOFEN 10 MG: 10 TABLET ORAL at 10:25

## 2024-11-01 RX ADMIN — INSULIN HUMAN 4 UNITS: 100 INJECTION, SOLUTION PARENTERAL at 18:11

## 2024-11-01 RX ADMIN — CARBIDOPA AND LEVODOPA 1 TABLET: 25; 100 TABLET ORAL at 12:53

## 2024-11-01 RX ADMIN — SODIUM CHLORIDE 100 ML/HR: 0.9 INJECTION, SOLUTION INTRAVENOUS at 14:18

## 2024-11-01 RX ADMIN — TRAZODONE HYDROCHLORIDE 100 MG: 100 TABLET ORAL at 22:39

## 2024-11-01 RX ADMIN — INSULIN LISPRO 2 UNITS: 100 INJECTION, SOLUTION INTRAVENOUS; SUBCUTANEOUS at 01:10

## 2024-11-01 RX ADMIN — CARBIDOPA AND LEVODOPA 1 TABLET: 25; 100 TABLET ORAL at 22:39

## 2024-11-01 RX ADMIN — BACLOFEN 10 MG: 10 TABLET ORAL at 18:11

## 2024-11-01 RX ADMIN — BISACODYL 10 MG: 5 TABLET, COATED ORAL at 18:11

## 2024-11-01 RX ADMIN — ENOXAPARIN SODIUM 40 MG: 40 INJECTION SUBCUTANEOUS at 10:25

## 2024-11-01 RX ADMIN — VALPROIC ACID 125 MG: 250 SOLUTION ORAL at 10:25

## 2024-11-01 RX ADMIN — SENNOSIDES AND DOCUSATE SODIUM 2 TABLET: 8.6; 5 TABLET ORAL at 18:11

## 2024-11-01 RX ADMIN — CARBIDOPA AND LEVODOPA 1 TABLET: 25; 100 TABLET ORAL at 10:25

## 2024-11-01 RX ADMIN — INSULIN LISPRO 1 UNITS: 100 INJECTION, SOLUTION INTRAVENOUS; SUBCUTANEOUS at 12:54

## 2024-11-01 RX ADMIN — CITALOPRAM HYDROBROMIDE 20 MG: 20 TABLET, FILM COATED ORAL at 10:25

## 2024-11-01 RX ADMIN — VALPROIC ACID 125 MG: 250 SOLUTION ORAL at 22:38

## 2024-11-01 RX ADMIN — BACLOFEN 10 MG: 10 TABLET ORAL at 22:39

## 2024-11-01 RX ADMIN — OXYBUTYNIN CHLORIDE 5 MG: 5 TABLET ORAL at 18:11

## 2024-11-01 RX ADMIN — CARBIDOPA AND LEVODOPA 1 TABLET: 25; 100 TABLET ORAL at 18:11

## 2024-11-01 RX ADMIN — PRAVASTATIN SODIUM 40 MG: 40 TABLET ORAL at 18:27

## 2024-11-01 RX ADMIN — IOHEXOL 90 ML: 350 INJECTION, SOLUTION INTRAVENOUS at 11:38

## 2024-11-01 RX ADMIN — INSULIN HUMAN 4 UNITS: 100 INJECTION, SOLUTION PARENTERAL at 01:14

## 2024-11-01 RX ADMIN — INSULIN LISPRO 1 UNITS: 100 INJECTION, SOLUTION INTRAVENOUS; SUBCUTANEOUS at 07:50

## 2024-11-01 RX ADMIN — OXYBUTYNIN CHLORIDE 5 MG: 5 TABLET ORAL at 10:25

## 2024-11-01 RX ADMIN — ARIPIPRAZOLE 2 MG: 2 TABLET ORAL at 22:40

## 2024-11-01 NOTE — PROGRESS NOTES
Progress Note - Hospitalist   Name: Terrie Alicea 64 y.o. female I MRN: [id number]  Unit/Bed#: S -01 I Date of Admission: 10/18/2024   Date of Service: 11/1/2024 I Hospital Day: 14    Assessment & Plan  Fever of unknown origin  Patient met sepsis criteria on 10/21 with fever, tachycardia, leukocytosis suspected source of UTI and pneumonia. Treated with ceftriaxone. However, patient continued to have recurrent fevers, leukocytosis, despite antibiotic management. Completed 7-day course of ceftriaxone on 10/28.  Further work-up of fever as below:  CT CAP - Multilobar pneumonia. No acute findings in the abdomen or pelvis. Prominent rectal vault stool without bowel obstruction.  CT temporal bones - no evidence of mastoiditis, sinusitis  10/28 CXR showed improved bilateral perihilar consolidations suggesting improved pneumonia  RP2 negative  Repeat UA negative  Legionella urine antigen negative  UE and LE Dopplers show no evidence of acute/chronic DVT  CT CAP showed decreased left greater than right bibasilar opacities, likely representing combination of atelectasis and pneumonia. Thickening on left proximal posterior thigh muscle with ill-defined linear hyperenhancement likely infectious vs inflammatory. Small nodular rim-enhancing focus in lower uterus/cervix possible small fibroid  Procalcitonin improved  10/29 blood cx pending  Pelvic US showed no sonographic findings to correlate CT finding in the lower uterus. Diffuse endometrial thickening and heterogeneity.    Reached out to heme/onc Dr. Judge via [employer] secure chat. After their review, less suspicious that etiology of her persistent fevers are from occult malignancy/tumor. Her initial hypercalcemia improved. Workup for myeloma was unremarkable. Recent CT CAP showed no evidence of lymphadenopathy or occult malignancy.  Reached out to Gyn  via "[employer] chat. After their review, low suspicion about the nodular focus in the TU/cervix since it  wasn't visualized on US which is the better imaging nodality for gyn. Regarding the thickened endometrium, she had prior workup for this a year ago which was benign. EMS is down to 5 mm from 9 mm prior. Given patient is not actively bleeding, no further work-up would be needed. Unlikely that gyn findings are the etiology of her fevers.     Fever etiology remain unclear at this time. Considering drug-induced vs infectious vs. Rheumatologic?. Less likely to be malignancy given work-up overall has been unremarkable.     Plan:  ID on board, appreciate recs  F/u CT report of left thigh  Switched IV Pantoprazole to oral pantoprazole  Switched DVT ppx from heparin to lovenox  Avoid nonessential meds  Last fever noted on 10/31/24 at 1514  Monitor temp  Trend WBCs  Tylenol prn  Sepsis (HCC)  10/21 Met SIRS criteria with fever, tachycardia  10/21 CXR - Faint inferior perihilar patchiness with subtle obscuration of the right cardiomediastinal silhouette could represent findings of aspiration in the appropriate clinical context.  Urinalysis - innumerable leukocytes and moderate bacteria on microscopy  Urine culture growing Morganella (susceptible to ceftriaxone)  10/22 Blood cultures negative to date  Elevated procalcitonin, bandemia    Plan  Infectious disease on board, appreciate recommendations  Completed 7-day course of Ceftriaxone on 10/28    UTI (urinary tract infection)  See plan for sepsis  Diabetes mellitus type 2, insulin dependent ([facility])  Lab Results   Component Value Date    HGBA1C 6.2 (H) 08/13/2024       Recent Labs     10/31/24  1217 10/31/24  1753 11/01/24  0106 11/01/24  0630   POCGLU 263* 194* 226* 164*       Blood Sugar Average: Last 72 hrs:  (P) [id number]  Home regimen: regular insulin 4 units twice daily at 6PM and midnight  10/22 Glucose levels elevated overnight, requiring insulin gtt    Plan  Continue PTA regimen of regular insulin 4 units twice daily (6PM and midnight)  Nutrition on board,  appreciate recs  Glucerna available again as of 10/31. Will switch tube feeds back to Glucerna   If Glucerna is not available, switch to vital 1.2 AF @ 80 mL/hr x12 hours per nutrition recs.  Continue sliding scale Algorithm 1 q6h  Monitor blood glucose  Goal -180 while admitted, adjust insulin regimen as appropriate  Cerebral palsy (HCC)  At baseline is non verbal , totally dependent on care, resides in intermediate care facility  Continue PTA baclofen TID  Bipolar disorder (HCC)  Continue PTA Depakene and Abilify  Iron deficiency anemia  Recent Labs     10/30/24  0734 10/31/24  0453 11/01/24  0531   HGB 8.8* 8.3* 8.6*   Baseline: 9-11, currently hemoglobin is baseline  Ordered iron studies  Daily CBC  Continue PTA ferrous sulfate  Transfuse if hemoglobin less than 7  Hypothyroidism  Continue PTA levothyroxine  TSH and T4 within normal limits  Parkinson's disease (HCC)  On Sinemet CR  mg 2 tablets BID at home  Switched to Sinemet immediate release  mg 1 tablet 4 times daily while admitted    VTE Pharmacologic Prophylaxis: VTE Score: 4 Moderate Risk (Score 3-4) - Pharmacological DVT Prophylaxis Ordered: enoxaparin (Lovenox).    Mobility:   Basic Mobility Inpatient Raw Score: 6  JH-HLM Goal: 2: Bed activities/Dependent transfer  JH-HLM Achieved: 2: Bed activities/Dependent transfer  JH-HLM Goal achieved. Continue to encourage appropriate mobility.    Patient Centered Rounds: I performed bedside rounds with nursing staff today.   Discussions with Specialists or Other Care Team Provider: ID    Education and Discussions with Family / Patient: Updated  (caregiver) via phone.    Current Length of Stay: 14 day(s)  Current Patient Status: Inpatient   Certification Statement: The patient will continue to require additional inpatient hospital stay due to fever of unknown origin, leukocytosis  Discharge Plan: Anticipate discharge in 48-72 hrs to prior assisted or independent living  facility.    Code Status: Level 1 - Full Code    Subjective   No acute events overnight. However, she did spike a fever of 101.1 F on 10/31 1514 (last recorded fever). Currently afebrile.    Objective :  Temp:  [98 °F (36.7 °C)-101.1 °F (38.4 °C)] 99.3 °F (37.4 °C)  HR:  [] 85  BP: (112-137)/(69-86) 118/69  Resp:  [18] 18  SpO2:  [96 %-98 %] 98 %  O2 Device: None (Room air)    Body mass index is 17.81 kg/m².     Input and Output Summary (last 24 hours):     Intake/Output Summary (Last 24 hours) at 11/1/2024 0654  Last data filed at 10/31/2024 1805  Gross per 24 hour   Intake 250 ml   Output --   Net 250 ml       Physical Exam  Vitals reviewed.   Constitutional:       General: She is not in acute distress.     Appearance: She is well-developed.   HENT:      Head: Normocephalic and atraumatic.   Eyes:      Conjunctiva/sclera: Conjunctivae normal.   Cardiovascular:      Rate and Rhythm: Normal rate and regular rhythm.      Pulses: Normal pulses.      Heart sounds: Normal heart sounds. No murmur heard.  Pulmonary:      Effort: Pulmonary effort is normal. No respiratory distress.      Breath sounds: Normal breath sounds.   Abdominal:      General: There is no distension.      Palpations: Abdomen is soft.      Tenderness: There is no abdominal tenderness.   Musculoskeletal:         General: No swelling.      Cervical back: Neck supple.   Skin:     General: Skin is warm and dry.      Capillary Refill: Capillary refill takes less than 2 seconds.      Findings: No bruising or lesion.   Neurological:      Mental Status: She is alert. Mental status is at baseline.       Lines/Drains:              Lab Results: I have reviewed the following results:   Results from last 7 days   Lab Units 11/01/24  0531 10/31/24  0453 10/30/24  0734 10/29/24  0516   WBC Thousand/uL 11.50* 9.50   < > 11.63*   HEMOGLOBIN g/dL 8.6* 8.3*   < > 10.2*   HEMATOCRIT % 27.0* 26.5*   < > 31.9*   PLATELETS Thousands/uL 282 258   < > 244   BANDS PCT %   --   --   --  8   SEGS PCT %  --  67   < >  --    LYMPHO PCT %  --  23   < > 27   MONO PCT %  --  6   < > 5   EOS PCT %  --  2   < > 1    < > = values in this interval not displayed.     Results from last 7 days   Lab Units 10/31/24  0453 10/30/24  0855 10/30/24  0734   SODIUM mmol/L 137  --  130*   POTASSIUM mmol/L 4.7   < > 5.8*   CHLORIDE mmol/L 107  --  101   CO2 mmol/L 25  --  25   BUN mg/dL 29*  --  27*   CREATININE mg/dL 0.87  --  1.01   ANION GAP mmol/L 5  --  4   CALCIUM mg/dL 8.3*  --  8.3*   ALBUMIN g/dL  --   --  3.4*   TOTAL BILIRUBIN mg/dL  --   --  0.34   ALK PHOS U/L  --   --  43   ALT U/L  --   --  18   AST U/L  --   --  36   GLUCOSE RANDOM mg/dL 220*  --  318*    < > = values in this interval not displayed.         Results from last 7 days   Lab Units 11/01/24  0630 11/01/24  0106 10/31/24  1753 10/31/24  1217 10/31/24  0457 10/31/24  0031 10/30/24  1742 10/30/24  1217 10/30/24  0645 10/30/24  0006 10/29/24  1807 10/29/24  1230   POC GLUCOSE mg/dl 164* 226* 194* 263* 207* 242* 228* 278* 366* 265* 173* 269*         Results from last 7 days   Lab Units 10/29/24  0516   PROCALCITONIN ng/ml 0.28*       Recent Cultures (last 7 days):   Results from last 7 days   Lab Units 10/29/24  1148 10/28/24  1432   BLOOD CULTURE  No Growth at 48 hrs.  No Growth at 48 hrs.  --    LEGIONELLA URINARY ANTIGEN   --  Negative             Last 24 Hours Medication List:     Current Facility-Administered Medications:     acetaminophen (TYLENOL) tablet 650 mg, Q6H PRN    ARIPiprazole (ABILIFY) tablet 2 mg, HS    [Held by provider] ascorbic acid (VITAMIN C) tablet 1,000 mg, Daily    baclofen tablet 10 mg, TID    carbidopa-levodopa (SINEMET)  mg per tablet 1 tablet, 4x Daily    citalopram (CeleXA) tablet 20 mg, Daily    enoxaparin (LOVENOX) subcutaneous injection 40 mg, Q24H ROSEMARY    Ferrous Sulfate oral syrup 300 mg, Every Other Day    influenza vaccine, recombinant (PF) (Flublok) IM injection 0.5 mL, Once    insulin  lispro (HumALOG/ADMELOG) 100 units/mL subcutaneous injection 1-5 Units, Q6H ROSEMARY **AND** Fingerstick Glucose (POCT), Q6H    insulin regular (HumuLIN R,NovoLIN R) injection 4 Units, BID    Ketotifen Fumarate (ZADITOR) 0.035 % ophthalmic solution 1 drop, BID PRN    levothyroxine tablet 25 mcg, Early Morning    LORazepam (ATIVAN) tablet 0.5 mg, HS    midodrine (PROAMATINE) tablet 5 mg, TID PRN    oxybutynin (DITROPAN) tablet 5 mg, TID    [Held by provider] pantoprazole (PROTONIX) EC tablet 20 mg, Early Morning    polyethylene glycol (MIRALAX) packet 17 g, Daily    pravastatin (PRAVACHOL) tablet 40 mg, Daily With Dinner    senna-docusate sodium (SENOKOT S) 8.6-50 mg per tablet 1 tablet, HS    traZODone (DESYREL) tablet 100 mg, HS PRN    valproic acid (DEPAKENE) oral soln 125 mg, BID    Administrative Statements   Today, Patient Was Seen By: Melonie Coleman MD      **Please Note: This note may have been constructed using a voice recognition system.**

## 2024-11-01 NOTE — ASSESSMENT & PLAN NOTE
Recent Labs     10/30/24  0734 10/31/24  0453 11/01/24  0531   HGB 8.8* 8.3* 8.6*   Baseline: 9-11, currently hemoglobin is baseline  Ordered iron studies  Daily CBC  Continue PTA ferrous sulfate  Transfuse if hemoglobin less than 7

## 2024-11-01 NOTE — PROGRESS NOTES
Progress Note - Infectious Disease   Name: Terrie Alicea 64 y.o. female I MRN: [id number]  Unit/Bed#: S -01 I Date of Admission: 10/18/2024   Date of Service: 11/1/2024 I Hospital Day: 14    Assessment & Plan  Fever of unknown origin  Patient persistently febrile since ~hospital day 3.  Blood cultures negative, multiple sets.  UA initially with TNTC WBC, Morganella on culture.  COVID/flu/RSV and Rp2 panel negative.  No LFT abnormalities.  CTAP with multifocal pulmonary infiltrates, no abdominal pathology and PEG site clean.  She completed 7 day course of ceftriaxone for pneumonia vs UTI.   Follow up chest XR showed improved infiltrates 10/28.  CTAP with contrast repeated 10/29 - largely resolving pulmonary opacities with call of posterior thigh muscle enhancement.  Full skin exam unremarkable, including posterior thigh - no fluctuance or erythema on exam.  CT of sinuses did not show pathology.  All 4 extremity dopplers negative for DVT and lung VQ scan low probability of PE.  Resides in a care facility, so shouldn't have any unusual exposures.  Urine Legionella negative.  Normal bowel movements.  Tolerating tube feeds.  She is receiving all home medications, aside from heparin, which was changed to enoxaparin on 10/29.  Procalcitonin down-trended.  WBC continues to downtrend since 10/28.  Fortunately, she remains hemodynamically stable, off antibiotics, with stable WBC count.  - Let's continue to monitor off antibiotics  - Repeat CT of left thigh to assess developing collection (still nothing on exam)  - Consider hematology consult (though no notable LAD on imaging) vs rheumatology consult  UTI (urinary tract infection)  Urine culture growing Morganella, TNTC WBC.  No indwelling catheter.  - Completed ceftriaxone course  Cerebral palsy (HCC)  Continue home medications  Bipolar disorder (HCC)  Continue home medications  Diabetes mellitus type 2, insulin dependent ([facility])  Lab Results   Component Value Date     HGBA1C 6.2 (H) 2024     Recent Labs     10/31/24  1217 10/31/24  1753 24  0106 24  0630   POCGLU 263* 194* 226* 164*   This is a risk factor for infection.  Well-controlled in this patient.  - Maintain euglycemia to improve WBC function  Parkinson's disease (HCC)  Continue home medication.  Hypothyroidism  TFT's were normal earlier in admission.    I have discussed the above management plan in detail with the primary service, Dr Coleman, who agrees with the plan to monitor off antibiotics and repeat imaging.  We will continue to follow along with you.    I have performed an extensive review of the medical records in Epic including review of the notes, radiographs, and laboratory results     Antibiotics:  Off antibiotics day 4    24 Hour Events:  No fevers overnight.    Subjective:  Patient non-verbal.  Resting comfortably this morning, sleeping.    Objective:  Vitals:  Temp:  [98.6 °F (37 °C)-101.1 °F (38.4 °C)] 99.1 °F (37.3 °C)  HR:  [] 80  Resp:  [18] 18  BP: (118-137)/(69-86) 121/84  SpO2:  [96 %-98 %] 98 %  Temp (24hrs), Av.7 °F (37.6 °C), Min:98.6 °F (37 °C), Max:101.1 °F (38.4 °C)  Current: Temperature: 99.1 °F (37.3 °C)      Physical Exam:   General: Appears chronically ill, in no apparent distress  HEENT:  Normocephalic, sclera anicteric  Lungs:  Unlabored respirations   Abdomen:  Soft, nontender, nondistended  Extremities:  No cyanosis or edema  Skin:  No rashes, lesions.  No tenderness, fluctuance, or erythema of posterior thigh  Neuro:  Awakens to voice      Labs:   All pertinent labs and imaging studies were personally reviewed  Results from last 7 days   Lab Units 24  0531 10/31/24  0453 10/30/24  0734   WBC Thousand/uL 11.50* 9.50 10.38*   HEMOGLOBIN g/dL 8.6* 8.3* 8.8*   PLATELETS Thousands/uL 282 258 264     Results from last 7 days   Lab Units 24  0531 10/31/24  0453 10/30/24  0855 10/30/24  0734 10/27/24  0424 10/26/24  0424   SODIUM mmol/L 138 137  --  130*    < > 143   POTASSIUM mmol/L 4.7 4.7   < > 5.8*   < > 5.4*   CHLORIDE mmol/L 102 107  --  101   < > 114*   CO2 mmol/L 29 25  --  25   < > 23   BUN mg/dL 30* 29*  --  27*   < > 44*   CREATININE mg/dL 1.02 0.87  --  1.01   < > 1.32*   EGFR ml/min/1.73sq m 59 71  --  59   < > 43   CALCIUM mg/dL 9.8 8.3*  --  8.3*   < > 8.0*   AST U/L 28  --   --  36  --  37   ALT U/L 14  --   --  18  --  14   ALK PHOS U/L 44  --   --  43  --  60    < > = values in this interval not displayed.     Results from last 7 days   Lab Units 10/29/24  0516   PROCALCITONIN ng/ml 0.28*     Results from last 7 days   Lab Units 10/29/24  0516   CRP mg/L 7.4*     Results from last 7 days   Lab Units 10/30/24  0855 10/29/24  0516   FERRITIN ng/mL 440* 447*             Microbiology:  Results from last 7 days   Lab Units 10/29/24  1148 10/28/24  1432   BLOOD CULTURE  No Growth at 48 hrs.  No Growth at 48 hrs.  --    LEGIONELLA URINARY ANTIGEN   --  Negative       Imaging:      Leander Dodd MD  [facility]

## 2024-11-01 NOTE — ASSESSMENT & PLAN NOTE
Patient met sepsis criteria on 10/21 with fever, tachycardia, leukocytosis suspected source of UTI and pneumonia. Treated with ceftriaxone. However, patient continued to have recurrent fevers, leukocytosis, despite antibiotic management. Completed 7-day course of ceftriaxone on 10/28.  Further work-up of fever as below:  CT CAP - Multilobar pneumonia. No acute findings in the abdomen or pelvis. Prominent rectal vault stool without bowel obstruction.  CT temporal bones - no evidence of mastoiditis, sinusitis  10/28 CXR showed improved bilateral perihilar consolidations suggesting improved pneumonia  RP2 negative  Repeat UA negative  Legionella urine antigen negative  UE and LE Dopplers show no evidence of acute/chronic DVT  CT CAP showed decreased left greater than right bibasilar opacities, likely representing combination of atelectasis and pneumonia. Thickening on left proximal posterior thigh muscle with ill-defined linear hyperenhancement likely infectious vs inflammatory. Small nodular rim-enhancing focus in lower uterus/cervix possible small fibroid  Procalcitonin improved  10/29 blood cx pending  Pelvic US showed no sonographic findings to correlate CT finding in the lower uterus. Diffuse endometrial thickening and heterogeneity.    Reached out to heme/onc Dr. Judge via [employer] chat. After their review, less suspicious that etiology of her persistent fevers are from occult malignancy/tumor. Her initial hypercalcemia improved. Workup for myeloma was unremarkable. Recent CT CAP showed no evidence of lymphadenopathy or occult malignancy.  Reached out to Gyn  via [employer] chat. After their review, low suspicion about the nodular focus in the TU/cervix since it wasn't visualized on US which is the better imaging nodality for gyn. Regarding the thickened endometrium, she had prior workup for this a year ago which was benign. EMS is down to 5 mm from 9 mm prior. Given patient is not actively  bleeding, no further work-up would be needed. Unlikely that gyn findings are the etiology of her fevers.     Fever etiology remain unclear at this time. Considering drug-induced vs infectious vs. Rheumatologic?. Less likely to be malignancy given work-up overall has been unremarkable.     Plan:  ID on board, appreciate recs  F/u CT report of left thigh  Switched IV Pantoprazole to oral pantoprazole  Switched DVT ppx from heparin to lovenox  Avoid nonessential meds  Last fever noted on 10/31/24 at 1514  Monitor temp  Trend WBCs  Tylenol prn

## 2024-11-01 NOTE — ASSESSMENT & PLAN NOTE
Lab Results   Component Value Date    HGBA1C 6.2 (H) 08/13/2024     Recent Labs     10/31/24  1217 10/31/24  1753 11/01/24  0106 11/01/24  0630   POCGLU 263* 194* 226* 164*   This is a risk factor for infection.  Well-controlled in this patient.  - Maintain euglycemia to improve WBC function

## 2024-11-01 NOTE — ASSESSMENT & PLAN NOTE
Lab Results   Component Value Date    HGBA1C 6.2 (H) 08/13/2024       Recent Labs     10/31/24  1217 10/31/24  1753 11/01/24  0106 11/01/24  0630   POCGLU 263* 194* 226* 164*       Blood Sugar Average: Last 72 hrs:  (P) 235.1150191385056084  Home regimen: regular insulin 4 units twice daily at 6PM and midnight  10/22 Glucose levels elevated overnight, requiring insulin gtt    Plan  Continue PTA regimen of regular insulin 4 units twice daily (6PM and midnight)  Nutrition on board, appreciate recs  Glucerna available again as of 10/31. Will switch tube feeds back to Glucerna   If Glucerna is not available, switch to vital 1.2 AF @ 80 mL/hr x12 hours per nutrition recs.  Continue sliding scale Algorithm 1 q6h  Monitor blood glucose  Goal -180 while admitted, adjust insulin regimen as appropriate

## 2024-11-01 NOTE — ASSESSMENT & PLAN NOTE
10/21 Met SIRS criteria with fever, tachycardia  10/21 CXR - Faint inferior perihilar patchiness with subtle obscuration of the right cardiomediastinal silhouette could represent findings of aspiration in the appropriate clinical context.  Urinalysis - innumerable leukocytes and moderate bacteria on microscopy  Urine culture growing Morganella (susceptible to ceftriaxone)  10/22 Blood cultures negative to date  Elevated procalcitonin, bandemia  CT CAP - Multilobar pneumonia. No acute findings in the abdomen or pelvis. Prominent rectal vault stool without bowel obstruction.  Patient continued to have recurrent fevers, leukocytosis, despite antibiotic management. Further work-up of fever as below:  CT temporal bones - no evidence of mastoiditis  10/28 CXR showed improved bilateral perihilar consolidations suggesting improved pneumonia  RP2 negative  Legionella urine antigen negative  LE Dopplers show no evidence of acute/chronic DVT  CT CAP showed decreased left greater than right bibasilar opacities, likely representing combination of atelectasis and pneumonia. Thickening on left proximal posterior thigh muscle with ill-defined linear hyperenhancement likely infectious vs inflammatory. Small nodular rim-enhancing focus in lower uterus/cervix possible small fibroid  10/29 blood cx pending  UE Dopplers pending    Fever etiology unclear at this time. More likely drug-induced than infectious (leukocytosis improved) and malignancy (workup overall unremarkable).   Did reach out to heme/onc Dr. Judge via [employer] secure chat. After their review, less suspicious that etiology of her persistent fevers are from occult malignancy/tumor. Her initial hypercalcemia improved. Workup for myeloma was unremarkable. Recent CT CAP showed no evidence of lymphadenopathy or occult malignancy.    Plan  Infectious disease on board, appreciate recommendations  Completed 7-day course of Ceftriaxone on 10/28  Switched IV Pantoprazole to oral  pantoprazole  Switched DVT ppx from heparin to lovenox  Avoid nonessential meds  Last fever noted on 10/30/24 at 1525  If patient continue to be afebrile today, plan for possible discharge back to facility tomorrow  If continues to have recurrent fevers, will consider repeating CT of left thigh tomorrow to assess developing collection. Possible pelvic ultrasound to further assess nodule in the lower uterus/cervix  Monitor temp  Trend WBCs

## 2024-11-01 NOTE — ASSESSMENT & PLAN NOTE
Lab Results   Component Value Date    HGBA1C 6.2 (H) 08/13/2024       Recent Labs     10/31/24  1217 10/31/24  1753 11/01/24  0106 11/01/24  0630   POCGLU 263* 194* 226* 164*       Blood Sugar Average: Last 72 hrs:  (P) 235.1117591475305227  Home regimen: regular insulin 4 units twice daily at 6PM and midnight  10/22 Glucose levels elevated overnight, requiring insulin gtt    Plan  Continue PTA regimen of regular insulin 4 units twice daily (6PM and midnight)  Nutrition on board, appreciate recs  Glucerna available again as of today. Will switch tube feeds back to Glucerna   If Glucerna is not available, switch to vital 1.2 AF @ 80 mL/hr x12 hours per nutrition recs.  Continue sliding scale Algorithm 1 q6h  Monitor blood glucose  Goal -180 while admitted, adjust insulin regimen as appropriate

## 2024-11-01 NOTE — ASSESSMENT & PLAN NOTE
10/21 Met SIRS criteria with fever, tachycardia  10/21 CXR - Faint inferior perihilar patchiness with subtle obscuration of the right cardiomediastinal silhouette could represent findings of aspiration in the appropriate clinical context.  Urinalysis - innumerable leukocytes and moderate bacteria on microscopy  Urine culture growing Morganella (susceptible to ceftriaxone)  10/22 Blood cultures negative to date  Elevated procalcitonin, bandemia    Plan  Infectious disease on board, appreciate recommendations  Completed 7-day course of Ceftriaxone on 10/28

## 2024-11-01 NOTE — PLAN OF CARE
Problem: Prexisting or High Potential for Compromised Skin Integrity  Goal: Skin integrity is maintained or improved  Description: INTERVENTIONS:  - Identify patients at risk for skin breakdown  - Assess and monitor skin integrity  - Assess and monitor nutrition and hydration status  - Monitor labs   - Assess for incontinence   - Turn and reposition patient  - Assist with mobility/ambulation  - Relieve pressure over bony prominences  - Avoid friction and shearing  - Provide appropriate hygiene as needed including keeping skin clean and dry  - Evaluate need for skin moisturizer/barrier cream  - Collaborate with interdisciplinary team   - Patient/family teaching  - Consider wound care consult   Outcome: Progressing     Problem: PAIN - ADULT  Goal: Verbalizes/displays adequate comfort level or baseline comfort level  Description: Interventions:  - Encourage patient to monitor pain and request assistance  - Assess pain using appropriate pain scale  - Administer analgesics based on type and severity of pain and evaluate response  - Implement non-pharmacological measures as appropriate and evaluate response  - Consider cultural and social influences on pain and pain management  - Notify physician/advanced practitioner if interventions unsuccessful or patient reports new pain  Outcome: Progressing     Problem: INFECTION - ADULT  Goal: Absence or prevention of progression during hospitalization  Description: INTERVENTIONS:  - Assess and monitor for signs and symptoms of infection  - Monitor lab/diagnostic results  - Monitor all insertion sites, i.e. indwelling lines, tubes, and drains  - Monitor endotracheal if appropriate and nasal secretions for changes in amount and color  - [place] appropriate cooling/warming therapies per order  - Administer medications as ordered  - Instruct and encourage patient and family to use good hand hygiene technique  - Identify and instruct in appropriate isolation precautions for  identified infection/condition  Outcome: Progressing  Goal: Absence of fever/infection during neutropenic period  Description: INTERVENTIONS:  - Monitor WBC    Outcome: Progressing     Problem: SAFETY ADULT  Goal: Patient will remain free of falls  Description: INTERVENTIONS:  - Educate patient/family on patient safety including physical limitations  - Instruct patient to call for assistance with activity   - Consult OT/PT to assist with strengthening/mobility   - Keep Call bell within reach  - Keep bed low and locked with side rails adjusted as appropriate  - Keep care items and personal belongings within reach  - Initiate and maintain comfort rounds  - Make Fall Risk Sign visible to staff  - Offer Toileting every  Hours, in advance of need  - Initiate/Maintain alarm  - Obtain necessary fall risk management equipment:   - Apply yellow socks and bracelet for high fall risk patients  - Consider moving patient to room near nurses station  Outcome: Progressing  Goal: Maintain or return to baseline ADL function  Description: INTERVENTIONS:  -  Assess patient's ability to carry out ADLs; assess patient's baseline for ADL function and identify physical deficits which impact ability to perform ADLs (bathing, care of mouth/teeth, toileting, grooming, dressing, etc.)  - Assess/evaluate cause of self-care deficits   - Assess range of motion  - Assess patient's mobility; develop plan if impaired  - Assess patient's need for assistive devices and provide as appropriate  - Encourage maximum independence but intervene and supervise when necessary  - Involve family in performance of ADLs  - Assess for home care needs following discharge   - Consider OT consult to assist with ADL evaluation and planning for discharge  - Provide patient education as appropriate  Outcome: Progressing  Goal: Maintains/Returns to pre admission functional level  Description: INTERVENTIONS:  - Perform AM-PAC 6 Click Basic Mobility/ Daily Activity  assessment daily.  - Set and communicate daily mobility goal to care team and patient/family/caregiver.   - Collaborate with rehabilitation services on mobility goals if consulted  - Perform Range of Motion  times a day.  - Reposition patient every  hours.  - Dangle patient  times a day  - Stand patient  times a day  - Ambulate patient  times a day  - Out of bed to chair  times a day   - Out of bed for meals  times a day  - Out of bed for toileting  - Record patient progress and toleration of activity level   Outcome: Progressing     Problem: DISCHARGE PLANNING  Goal: Discharge to home or other facility with appropriate resources  Description: INTERVENTIONS:  - Identify barriers to discharge w/patient and caregiver  - Arrange for needed discharge resources and transportation as appropriate  - Identify discharge learning needs (meds, wound care, etc.)  - Arrange for interpretive services to assist at discharge as needed  - Refer to Case Management Department for coordinating discharge planning if the patient needs post-hospital services based on physician/advanced practitioner order or complex needs related to functional status, cognitive ability, or social support system  Outcome: Progressing     Problem: Knowledge Deficit  Goal: Patient/family/caregiver demonstrates understanding of disease process, treatment plan, medications, and discharge instructions  Description: Complete learning assessment and assess knowledge base.  Interventions:  - Provide teaching at level of understanding  - Provide teaching via preferred learning methods  Outcome: Progressing     Problem: Nutrition/Hydration-ADULT  Goal: Nutrient/Hydration intake appropriate for improving, restoring or maintaining nutritional needs  Description: Monitor and assess patient's nutrition/hydration status for malnutrition. Collaborate with interdisciplinary team and initiate plan and interventions as ordered.  Monitor patient's weight and dietary intake as  ordered or per policy. Utilize nutrition screening tool and intervene as necessary. Determine patient's food preferences and provide high-protein, high-caloric foods as appropriate.     INTERVENTIONS:  - Monitor oral intake, urinary output, labs, and treatment plans  - Assess nutrition and hydration status and recommend course of action  - Evaluate amount of meals eaten  - Assist patient with eating if necessary   - Allow adequate time for meals  - Recommend/ encourage appropriate diets, oral nutritional supplements, and vitamin/mineral supplements  - Order, calculate, and assess calorie counts as needed  - Recommend, monitor, and adjust tube feedings and TPN/PPN based on assessed needs  - Assess need for intravenous fluids  - Provide specific nutrition/hydration education as appropriate  - Include patient/family/caregiver in decisions related to nutrition  Outcome: Progressing     Problem: Potential for Falls  Goal: Patient will remain free of falls  Description: INTERVENTIONS:  - Educate patient/family on patient safety including physical limitations  - Instruct patient to call for assistance with activity   - Consult OT/PT to assist with strengthening/mobility   - Keep Call bell within reach  - Keep bed low and locked with side rails adjusted as appropriate  - Keep care items and personal belongings within reach  - Initiate and maintain comfort rounds  - Make Fall Risk Sign visible to staff  - Offer Toileting every  Hours, in advance of need  - Initiate/Maintain alarm  - Obtain necessary fall risk management equipment:   - Apply yellow socks and bracelet for high fall risk patients  - Consider moving patient to room near nurses station  Outcome: Progressing

## 2024-11-01 NOTE — ASSESSMENT & PLAN NOTE
Patient persistently febrile since ~hospital day 3.  Blood cultures negative, multiple sets.  UA initially with TNTC WBC, Morganella on culture.  COVID/flu/RSV and Rp2 panel negative.  No LFT abnormalities.  CTAP with multifocal pulmonary infiltrates, no abdominal pathology and PEG site clean.  She completed 7 day course of ceftriaxone for pneumonia vs UTI.   Follow up chest XR showed improved infiltrates 10/28.  CTAP with contrast repeated 10/29 - largely resolving pulmonary opacities with call of posterior thigh muscle enhancement.  Full skin exam unremarkable, including posterior thigh - no fluctuance or erythema on exam.  CT of sinuses did not show pathology.  All 4 extremity dopplers negative for DVT and lung VQ scan low probability of PE.  Resides in a care facility, so shouldn't have any unusual exposures.  Urine Legionella negative.  Normal bowel movements.  Tolerating tube feeds.  She is receiving all home medications, aside from heparin, which was changed to enoxaparin on 10/29.  Procalcitonin down-trended.  WBC continues to downtrend since 10/28.  Fortunately, she remains hemodynamically stable, off antibiotics, with stable WBC count.  - Let's continue to monitor off antibiotics  - Repeat CT of left thigh to assess developing collection (still nothing on exam)  - Consider hematology consult (though no notable LAD on imaging) vs rheumatology consult

## 2024-11-02 PROBLEM — N39.0 UTI (URINARY TRACT INFECTION): Status: RESOLVED | Noted: 2017-03-27 | Resolved: 2024-11-02

## 2024-11-02 PROBLEM — A41.9 SEPSIS (HCC): Status: RESOLVED | Noted: 2018-12-17 | Resolved: 2024-11-02

## 2024-11-02 PROBLEM — T14.8XXA HEMATOMA: Status: ACTIVE | Noted: 2024-11-02

## 2024-11-02 LAB
BASOPHILS # BLD AUTO: 0.05 THOUSANDS/ΜL (ref 0–0.1)
BASOPHILS NFR BLD AUTO: 0 % (ref 0–1)
EOSINOPHIL # BLD AUTO: 0.22 THOUSAND/ΜL (ref 0–0.61)
EOSINOPHIL NFR BLD AUTO: 2 % (ref 0–6)
ERYTHROCYTE [DISTWIDTH] IN BLOOD BY AUTOMATED COUNT: 14.6 % (ref 11.6–15.1)
GLUCOSE SERPL-MCNC: 122 MG/DL (ref 65–140)
GLUCOSE SERPL-MCNC: 147 MG/DL (ref 65–140)
GLUCOSE SERPL-MCNC: 154 MG/DL (ref 65–140)
GLUCOSE SERPL-MCNC: 161 MG/DL (ref 65–140)
HCT VFR BLD AUTO: 23.4 % (ref 34.8–46.1)
HCT VFR BLD AUTO: 24.8 % (ref 34.8–46.1)
HGB BLD-MCNC: 7.6 G/DL (ref 11.5–15.4)
HGB BLD-MCNC: 7.9 G/DL (ref 11.5–15.4)
IMM GRANULOCYTES # BLD AUTO: 0.09 THOUSAND/UL (ref 0–0.2)
IMM GRANULOCYTES NFR BLD AUTO: 1 % (ref 0–2)
LYMPHOCYTES # BLD AUTO: 1.72 THOUSANDS/ΜL (ref 0.6–4.47)
LYMPHOCYTES NFR BLD AUTO: 15 % (ref 14–44)
MCH RBC QN AUTO: 34.1 PG (ref 26.8–34.3)
MCHC RBC AUTO-ENTMCNC: 32.5 G/DL (ref 31.4–37.4)
MCV RBC AUTO: 105 FL (ref 82–98)
MONOCYTES # BLD AUTO: 0.53 THOUSAND/ΜL (ref 0.17–1.22)
MONOCYTES NFR BLD AUTO: 5 % (ref 4–12)
NEUTROPHILS # BLD AUTO: 8.94 THOUSANDS/ΜL (ref 1.85–7.62)
NEUTS SEG NFR BLD AUTO: 77 % (ref 43–75)
NRBC BLD AUTO-RTO: 0 /100 WBCS
PLATELET # BLD AUTO: 244 THOUSANDS/UL (ref 149–390)
PMV BLD AUTO: 11.6 FL (ref 8.9–12.7)
RBC # BLD AUTO: 2.23 MILLION/UL (ref 3.81–5.12)
WBC # BLD AUTO: 11.55 THOUSAND/UL (ref 4.31–10.16)

## 2024-11-02 PROCEDURE — 82948 REAGENT STRIP/BLOOD GLUCOSE: CPT

## 2024-11-02 PROCEDURE — 85018 HEMOGLOBIN: CPT

## 2024-11-02 PROCEDURE — 85025 COMPLETE CBC W/AUTO DIFF WBC: CPT

## 2024-11-02 PROCEDURE — 99222 1ST HOSP IP/OBS MODERATE 55: CPT | Performed by: SURGERY

## 2024-11-02 PROCEDURE — 85014 HEMATOCRIT: CPT

## 2024-11-02 PROCEDURE — 99233 SBSQ HOSP IP/OBS HIGH 50: CPT | Performed by: INTERNAL MEDICINE

## 2024-11-02 PROCEDURE — 99232 SBSQ HOSP IP/OBS MODERATE 35: CPT | Performed by: STUDENT IN AN ORGANIZED HEALTH CARE EDUCATION/TRAINING PROGRAM

## 2024-11-02 RX ORDER — SODIUM CHLORIDE 9 MG/ML
100 INJECTION, SOLUTION INTRAVENOUS CONTINUOUS
Status: DISPENSED | OUTPATIENT
Start: 2024-11-02 | End: 2024-11-02

## 2024-11-02 RX ORDER — MIDODRINE HYDROCHLORIDE 5 MG/1
5 TABLET ORAL 3 TIMES DAILY PRN
Status: DISCONTINUED | OUTPATIENT
Start: 2024-11-02 | End: 2024-11-04 | Stop reason: HOSPADM

## 2024-11-02 RX ADMIN — POLYETHYLENE GLYCOL 3350 17 G: 17 POWDER, FOR SOLUTION ORAL at 11:07

## 2024-11-02 RX ADMIN — CITALOPRAM HYDROBROMIDE 20 MG: 20 TABLET, FILM COATED ORAL at 11:07

## 2024-11-02 RX ADMIN — ARIPIPRAZOLE 2 MG: 2 TABLET ORAL at 22:01

## 2024-11-02 RX ADMIN — CARBIDOPA AND LEVODOPA 1 TABLET: 25; 100 TABLET ORAL at 18:47

## 2024-11-02 RX ADMIN — BACLOFEN 10 MG: 10 TABLET ORAL at 22:00

## 2024-11-02 RX ADMIN — SENNOSIDES AND DOCUSATE SODIUM 2 TABLET: 8.6; 5 TABLET ORAL at 18:47

## 2024-11-02 RX ADMIN — PRAVASTATIN SODIUM 40 MG: 40 TABLET ORAL at 15:45

## 2024-11-02 RX ADMIN — CARBIDOPA AND LEVODOPA 1 TABLET: 25; 100 TABLET ORAL at 21:58

## 2024-11-02 RX ADMIN — OXYBUTYNIN CHLORIDE 5 MG: 5 TABLET ORAL at 11:07

## 2024-11-02 RX ADMIN — TRAZODONE HYDROCHLORIDE 100 MG: 100 TABLET ORAL at 22:00

## 2024-11-02 RX ADMIN — OXYBUTYNIN CHLORIDE 5 MG: 5 TABLET ORAL at 22:00

## 2024-11-02 RX ADMIN — OXYBUTYNIN CHLORIDE 5 MG: 5 TABLET ORAL at 15:45

## 2024-11-02 RX ADMIN — INSULIN HUMAN 4 UNITS: 100 INJECTION, SOLUTION PARENTERAL at 01:04

## 2024-11-02 RX ADMIN — INSULIN LISPRO 1 UNITS: 100 INJECTION, SOLUTION INTRAVENOUS; SUBCUTANEOUS at 18:44

## 2024-11-02 RX ADMIN — INSULIN LISPRO 1 UNITS: 100 INJECTION, SOLUTION INTRAVENOUS; SUBCUTANEOUS at 05:59

## 2024-11-02 RX ADMIN — BACLOFEN 10 MG: 10 TABLET ORAL at 15:45

## 2024-11-02 RX ADMIN — VALPROIC ACID 125 MG: 250 SOLUTION ORAL at 21:58

## 2024-11-02 RX ADMIN — BACLOFEN 10 MG: 10 TABLET ORAL at 11:07

## 2024-11-02 RX ADMIN — SODIUM CHLORIDE 100 ML/HR: 0.9 INJECTION, SOLUTION INTRAVENOUS at 11:08

## 2024-11-02 RX ADMIN — INSULIN HUMAN 4 UNITS: 100 INJECTION, SOLUTION PARENTERAL at 18:48

## 2024-11-02 RX ADMIN — VALPROIC ACID 125 MG: 250 SOLUTION ORAL at 11:07

## 2024-11-02 RX ADMIN — LORAZEPAM 0.5 MG: 0.5 TABLET ORAL at 21:58

## 2024-11-02 RX ADMIN — CARBIDOPA AND LEVODOPA 1 TABLET: 25; 100 TABLET ORAL at 15:45

## 2024-11-02 RX ADMIN — LEVOTHYROXINE SODIUM 25 MCG: 25 TABLET ORAL at 05:59

## 2024-11-02 RX ADMIN — SENNOSIDES AND DOCUSATE SODIUM 2 TABLET: 8.6; 5 TABLET ORAL at 11:07

## 2024-11-02 RX ADMIN — CARBIDOPA AND LEVODOPA 1 TABLET: 25; 100 TABLET ORAL at 11:07

## 2024-11-02 NOTE — PROGRESS NOTES
Progress Note - Infectious Disease   Name: Terrie Alicea 64 y.o. female I MRN: [id number]  Unit/Bed#: S -01 I Date of Admission: 10/18/2024   Date of Service: 11/2/2024 I Hospital Day: 15    Assessment & Plan  Fever of unknown origin  Patient persistently febrile since ~hospital day 3.  Blood cultures negative, multiple sets.  UA initially with TNTC WBC, Morganella on culture.  COVID/flu/RSV and Rp2 panel negative.  No LFT abnormalities.  CTAP with multifocal pulmonary infiltrates, no abdominal pathology and PEG site clean.  She completed 7 day course of ceftriaxone for pneumonia vs UTI.   Follow up chest XR showed improved infiltrates 10/28.  CTAP with contrast repeated 10/29 - largely resolving pulmonary opacities with call of posterior thigh muscle enhancement.  Full skin exam unremarkable, including posterior thigh - no fluctuance or erythema on exam.  CT of sinuses did not show pathology.  All 4 extremity dopplers negative for DVT and lung VQ scan low probability of PE.  Resides in a care facility, so shouldn't have any unusual exposures.  Urine Legionella negative.  Normal bowel movements.  Tolerating tube feeds.  She is receiving all home medications, aside from heparin, which was changed to enoxaparin on 10/29.  Procalcitonin down-trended.  Repeat CT on 11/1 of left thigh shows enlarging collection likely hematoma within the muscle.  She remains hemodynamically stable, with stable WBC count.  Fever likely due to intramuscular hematoma.  - Let's continue to monitor off antibiotics  - Would hold enoxaparin and use mechanical DVT ppx  - Also with fecal impaction (could also be causing fevers) - may need manual disimpaction, and would escalate bowel regimen   UTI (urinary tract infection)  Urine culture growing Morganella, TNTC WBC.  No indwelling catheter.  - Completed ceftriaxone course  Cerebral palsy (HCC)  Continue home medications  Bipolar disorder (HCC)  Continue home medications  Diabetes  mellitus type 2, insulin dependent (HCC)  Lab Results   Component Value Date    HGBA1C 6.2 (H) 2024     Recent Labs     24  1234 24  1756 24  0017 24  0547   POCGLU 200* 132 122 161*   This is a risk factor for infection.  Well-controlled in this patient.  - Maintain euglycemia to improve WBC function  Parkinson's disease (HCC)  Continue home medication.  Hypothyroidism  TFT's were normal earlier in admission.    I have discussed the above management plan in detail with the primary service, Dr Coleman, who agrees with the plan to monitor off antibiotics.  We will continue to follow along with you.    I have performed an extensive review of the medical records in Epic including review of the notes, radiographs, and laboratory results     Antibiotics:  Off antibiotics day 5    24 Hour Events:  No fevers overnight.    Subjective:  Patient non-verbal.  Resting comfortably this morning, sleeping but arousable.    Objective:  Vitals:  Temp:  [97.6 °F (36.4 °C)-99.1 °F (37.3 °C)] 98.9 °F (37.2 °C)  HR:  [60-80] 68  Resp:  [15-19] 19  BP: ()/(48-84) 108/55  SpO2:  [96 %-100 %] 98 %  Temp (24hrs), Av.4 °F (36.9 °C), Min:97.6 °F (36.4 °C), Max:99.1 °F (37.3 °C)  Current: Temperature: 98.9 °F (37.2 °C)      Physical Exam:   General: Appears chronically ill, in no apparent distress  HEENT:  Normocephalic, sclera anicteric  Lungs:  Unlabored respirations   Abdomen:  Soft, nontender, nondistended  Extremities:  No cyanosis or edema  Skin:  No rashes, lesions.  No tenderness, fluctuance, or erythema of posterior thigh  Neuro:  Awakens to voice      Labs:   All pertinent labs and imaging studies were personally reviewed  Results from last 7 days   Lab Units 24  0543 24  0531 10/31/24  0453   WBC Thousand/uL 11.55* 11.50* 9.50   HEMOGLOBIN g/dL 7.6* 8.6* 8.3*   PLATELETS Thousands/uL 244 282 258     Results from last 7 days   Lab Units 24  0531 10/31/24  0453 10/30/24  0870  10/30/24  0734   SODIUM mmol/L 138 137  --  130*   POTASSIUM mmol/L 4.7 4.7   < > 5.8*   CHLORIDE mmol/L 102 107  --  101   CO2 mmol/L 29 25  --  25   BUN mg/dL 30* 29*  --  27*   CREATININE mg/dL 1.02 0.87  --  1.01   EGFR ml/min/1.73sq m 59 71  --  59   CALCIUM mg/dL 9.8 8.3*  --  8.3*   AST U/L 28  --   --  36   ALT U/L 14  --   --  18   ALK PHOS U/L 44  --   --  43    < > = values in this interval not displayed.     Results from last 7 days   Lab Units 10/29/24  0516   PROCALCITONIN ng/ml 0.28*     Results from last 7 days   Lab Units 10/29/24  0516   CRP mg/L 7.4*     Results from last 7 days   Lab Units 10/30/24  0855 10/29/24  0516   FERRITIN ng/mL 440* 447*             Microbiology:  Results from last 7 days   Lab Units 10/29/24  1148 10/28/24  1432   BLOOD CULTURE  No Growth at 72 hrs.  No Growth at 72 hrs.  --    LEGIONELLA URINARY ANTIGEN   --  Negative       Imaging:  CT LLE with contrast 11/1/24:  I independently reviewed the above images and reports in PACS.  There is enlarged left posterior thigh collection and large stool burden.    Leander Dodd MD  [facility]

## 2024-11-02 NOTE — ASSESSMENT & PLAN NOTE
Patient persistently febrile since ~hospital day 3.  Blood cultures negative, multiple sets.  UA initially with TNTC WBC, Morganella on culture.  COVID/flu/RSV and Rp2 panel negative.  No LFT abnormalities.  CTAP with multifocal pulmonary infiltrates, no abdominal pathology and PEG site clean.  She completed 7 day course of ceftriaxone for pneumonia vs UTI.   Follow up chest XR showed improved infiltrates 10/28.  CTAP with contrast repeated 10/29 - largely resolving pulmonary opacities with call of posterior thigh muscle enhancement.  Full skin exam unremarkable, including posterior thigh - no fluctuance or erythema on exam.  CT of sinuses did not show pathology.  All 4 extremity dopplers negative for DVT and lung VQ scan low probability of PE.  Resides in a care facility, so shouldn't have any unusual exposures.  Urine Legionella negative.  Normal bowel movements.  Tolerating tube feeds.  She is receiving all home medications, aside from heparin, which was changed to enoxaparin on 10/29.  Procalcitonin down-trended.  Repeat CT on 11/1 of left thigh shows enlarging collection likely hematoma within the muscle.  She remains hemodynamically stable, with stable WBC count.  Fever likely due to intramuscular hematoma.  - Let's continue to monitor off antibiotics  - Would hold enoxaparin and use mechanical DVT ppx  - Also with fecal impaction (could also be causing fevers) - may need manual disimpaction, and would escalate bowel regimen    Attending Only

## 2024-11-02 NOTE — PROGRESS NOTES
Progress Note - Hospitalist   Name: Terrie Alicea 64 y.o. female I MRN: [id number]  Unit/Bed#: S -01 I Date of Admission: 10/18/2024   Date of Service: 11/2/2024 I Hospital Day: 15    Assessment & Plan  Fever of unknown origin  Patient met sepsis criteria on 10/21 with fever, tachycardia, leukocytosis suspected source of UTI and pneumonia. Treated with ceftriaxone. However, patient continued to have recurrent fevers, leukocytosis, despite antibiotic management. Completed 7-day course of ceftriaxone on 10/28.  Further work-up of fever as below:  CT CAP - Multilobar pneumonia. No acute findings in the abdomen or pelvis. Prominent rectal vault stool without bowel obstruction.  CT temporal bones - no evidence of mastoiditis, sinusitis  10/28 CXR showed improved bilateral perihilar consolidations suggesting improved pneumonia  RP2 negative  Repeat UA negative  Legionella urine antigen negative  UE and LE Dopplers show no evidence of acute/chronic DVT  CT CAP showed decreased left greater than right bibasilar opacities, likely representing combination of atelectasis and pneumonia. Thickening on left proximal posterior thigh muscle with ill-defined linear hyperenhancement likely infectious vs inflammatory. Small nodular rim-enhancing focus in lower uterus/cervix possible small fibroid  Procalcitonin improved  10/29 blood cx pending  Pelvic US showed no sonographic findings to correlate CT finding in the lower uterus. Diffuse endometrial thickening and heterogeneity.  Reached out to heme/onc Dr. Judge via [employer] secure chat. After their review, less suspicious that etiology of her persistent fevers are from occult malignancy/tumor. Her initial hypercalcemia improved. Workup for myeloma was unremarkable. Recent CT CAP showed no evidence of lymphadenopathy or occult malignancy.  Reached out to Gyn  via [employer] chat. After their review, low suspicion about the nodular focus in the TU/cervix since it wasn't  visualized on US which is the better imaging nodality for gyn. Regarding the thickened endometrium, she had prior workup for this a year ago which was benign. EMS is down to 5 mm from 9 mm prior. Given patient is not actively bleeding, no further work-up would be needed. Unlikely that gyn findings are the etiology of her fevers.   CT of left lower extremity showed intramuscular hematoma within the left gluteus young muscle.    Fever etiology likely secondary to hematoma.   Considering drug-induced vs infectious vs. Rheumatologic?. Less likely to be malignancy given work-up overall has been unremarkable.     Plan:  ID on board, appreciate recs  Stop Lovenox  Resumed PO pantoprazole  Avoid nonessential meds  Last fever noted on 10/31/24 at 1514  Monitor temp  Trend WBCs  Tylenol prn  Hematoma  CT of left lower extremity showed intramuscular hematoma within the left gluteus young muscle.  Surgery consulted  No plan for surgical intervention at this time  Iron deficiency anemia  Recent Labs     10/31/24  0453 11/01/24  0531 11/02/24  0543   HGB 8.3* 8.6* 7.6*   Baseline: 9-11  Iron studies done, suggestive of iron deficiency vs anemia of chronic disease  Daily CBC  Continue PTA ferrous sulfate  Resumed PO pantoprazole  Transfuse if hemoglobin less than 7  Diabetes mellitus type 2, insulin dependent (HCC)  Lab Results   Component Value Date    HGBA1C 6.2 (H) 08/13/2024       Recent Labs     11/01/24  1234 11/01/24  1756 11/02/24  0017 11/02/24  0547   POCGLU 200* 132 122 161*       Blood Sugar Average: Last 72 hrs:  (P) 217.1662505038025096  Home regimen: regular insulin 4 units twice daily at 6PM and midnight  10/22 Glucose levels elevated overnight, requiring insulin gtt    Plan  Continue PTA regimen of regular insulin 4 units twice daily (6PM and midnight)  Nutrition on board, appreciate recs  Glucerna available again as of 10/31. Will switch tube feeds back to Glucerna   If Glucerna is not available, switch to  vital 1.2 AF @ 80 mL/hr x12 hours per nutrition recs.  Continue sliding scale Algorithm 1 q6h  Monitor blood glucose  Goal -180 while admitted, adjust insulin regimen as appropriate  Cerebral palsy (HCC)  At baseline is non verbal , totally dependent on care, resides in intermediate care facility  Continue PTA baclofen TID  Bipolar disorder (HCC)  Continue PTA Depakene and Abilify  Hypothyroidism  Continue PTA levothyroxine  TSH and T4 within normal limits  Parkinson's disease (HCC)  On Sinemet CR  mg 2 tablets BID at home  Switched to Sinemet immediate release  mg 1 tablet 4 times daily while admitted    VTE Pharmacologic Prophylaxis: VTE Score: 4 Moderate Risk (Score 3-4) - Pharmacological DVT Prophylaxis Contraindicated. Sequential Compression Devices Ordered.    Mobility:   Basic Mobility Inpatient Raw Score: 6  JH-HLM Goal: 2: Bed activities/Dependent transfer  JH-HLM Achieved: 1: Laying in bed  JH-HLM Goal NOT achieved. Continue with multidisciplinary rounding and encourage appropriate mobility to improve upon JH-HLM goals.    Patient Centered Rounds: I performed bedside rounds with nursing staff today.   Discussions with Specialists or Other Care Team Provider: ID, Surgery, Nutrition    Education and Discussions with Family / Patient: Updated  (caregiver) via phone.    Current Length of Stay: 15 day(s)  Current Patient Status: Inpatient   Certification Statement: The patient will continue to require additional inpatient hospital stay due to hematoma  Discharge Plan: Anticipate discharge in 48-72 hrs to prior assisted or independent living facility.    Code Status: Level 1 - Full Code    Subjective   No acute events overnight. Patient was lying in bed, no acute distress on evaluation. No fevers overnight. She did have an episode of BP with a MAP <65. Improved with recheck. No bowel movements yet per daytime nurse.    Objective :  Temp:  [97.6 °F (36.4 °C)-98.9 °F (37.2 °C)] 98.9  °F (37.2 °C)  HR:  [60-76] 68  BP: ()/(48-62) 108/55  Resp:  [15-19] 19  SpO2:  [96 %-100 %] 98 %    Body mass index is 17.81 kg/m².     Input and Output Summary (last 24 hours):     Intake/Output Summary (Last 24 hours) at 11/2/2024 0947  Last data filed at 11/1/2024 1300  Gross per 24 hour   Intake 0 ml   Output --   Net 0 ml       Physical Exam  Vitals reviewed.   Constitutional:       General: She is not in acute distress.     Appearance: She is well-developed.   HENT:      Head: Normocephalic and atraumatic.   Eyes:      Conjunctiva/sclera: Conjunctivae normal.   Cardiovascular:      Rate and Rhythm: Normal rate and regular rhythm.      Pulses: Normal pulses.      Heart sounds: Normal heart sounds.   Pulmonary:      Effort: No respiratory distress.      Breath sounds: Normal breath sounds.   Abdominal:      General: There is no distension.      Palpations: Abdomen is soft.      Tenderness: There is no abdominal tenderness.   Musculoskeletal:         General: No swelling or tenderness.   Skin:     General: Skin is warm and dry.      Capillary Refill: Capillary refill takes less than 2 seconds.   Neurological:      Mental Status: She is alert. Mental status is at baseline.         Lines/Drains:              Lab Results: I have reviewed the following results:   Results from last 7 days   Lab Units 11/02/24  0543 10/30/24  0734 10/29/24  0516   WBC Thousand/uL 11.55*   < > 11.63*   HEMOGLOBIN g/dL 7.6*   < > 10.2*   HEMATOCRIT % 23.4*   < > 31.9*   PLATELETS Thousands/uL 244   < > 244   BANDS PCT %  --   --  8   SEGS PCT % 77*   < >  --    LYMPHO PCT % 15   < > 27   MONO PCT % 5   < > 5   EOS PCT % 2   < > 1    < > = values in this interval not displayed.     Results from last 7 days   Lab Units 11/01/24  0531   SODIUM mmol/L 138   POTASSIUM mmol/L 4.7   CHLORIDE mmol/L 102   CO2 mmol/L 29   BUN mg/dL 30*   CREATININE mg/dL 1.02   ANION GAP mmol/L 7   CALCIUM mg/dL 9.8   ALBUMIN g/dL 3.3*   TOTAL BILIRUBIN  mg/dL 0.21   ALK PHOS U/L 44   ALT U/L 14   AST U/L 28   GLUCOSE RANDOM mg/dL 174*         Results from last 7 days   Lab Units 11/02/24  0547 11/02/24  0017 11/01/24  1756 11/01/24  1234 11/01/24  0630 11/01/24  0106 10/31/24  1753 10/31/24  1217 10/31/24  0457 10/31/24  0031 10/30/24  1742 10/30/24  1217   POC GLUCOSE mg/dl 161* 122 132 200* 164* 226* 194* 263* 207* 242* 228* 278*         Results from last 7 days   Lab Units 10/29/24  0516   PROCALCITONIN ng/ml 0.28*       Recent Cultures (last 7 days):   Results from last 7 days   Lab Units 10/29/24  1148 10/28/24  1432   BLOOD CULTURE  No Growth at 72 hrs.  No Growth at 72 hrs.  --    LEGIONELLA URINARY ANTIGEN   --  Negative             Last 24 Hours Medication List:     Current Facility-Administered Medications:     acetaminophen (TYLENOL) tablet 650 mg, Q6H PRN    ARIPiprazole (ABILIFY) tablet 2 mg, HS    [Held by provider] ascorbic acid (VITAMIN C) tablet 1,000 mg, Daily    baclofen tablet 10 mg, TID    carbidopa-levodopa (SINEMET)  mg per tablet 1 tablet, 4x Daily    citalopram (CeleXA) tablet 20 mg, Daily    [Held by provider] enoxaparin (LOVENOX) subcutaneous injection 40 mg, Q24H [facility]    Ferrous Sulfate oral syrup 300 mg, Every Other Day    influenza vaccine, recombinant (PF) (Flublok) IM injection 0.5 mL, Once    insulin lispro (HumALOG/ADMELOG) 100 units/mL subcutaneous injection 1-5 Units, Q6H [facility] **AND** Fingerstick Glucose (POCT), Q6H    insulin regular (HumuLIN R,NovoLIN R) injection 4 Units, BID    Ketotifen Fumarate (ZADITOR) 0.035 % ophthalmic solution 1 drop, BID PRN    levothyroxine tablet 25 mcg, Early Morning    LORazepam (ATIVAN) tablet 0.5 mg, HS    midodrine (PROAMATINE) tablet 5 mg, TID PRN    oxybutynin (DITROPAN) tablet 5 mg, TID    pantoprazole (PROTONIX) EC tablet 20 mg, Early Morning    polyethylene glycol (MIRALAX) packet 17 g, Daily    pravastatin (PRAVACHOL) tablet 40 mg, Daily With Dinner    senna-docusate sodium (SENOKOT  S) 8.6-50 mg per tablet 2 tablet, BID    sodium chloride 0.9 % infusion, Continuous    traZODone (DESYREL) tablet 100 mg, HS PRN    valproic acid (DEPAKENE) oral soln 125 mg, BID    Administrative Statements   Today, Patient Was Seen By: Melonie Coleman MD      **Please Note: This note may have been constructed using a voice recognition system.**

## 2024-11-02 NOTE — ASSESSMENT & PLAN NOTE
CT of left lower extremity showed intramuscular hematoma within the left gluteus young muscle.  Surgery consulted  No plan for surgical intervention at this time

## 2024-11-02 NOTE — ASSESSMENT & PLAN NOTE
Patient met sepsis criteria on 10/21 with fever, tachycardia, leukocytosis suspected source of UTI and pneumonia. Treated with ceftriaxone. However, patient continued to have recurrent fevers, leukocytosis, despite antibiotic management. Completed 7-day course of ceftriaxone on 10/28.  Further work-up of fever as below:  CT CAP - Multilobar pneumonia. No acute findings in the abdomen or pelvis. Prominent rectal vault stool without bowel obstruction.  CT temporal bones - no evidence of mastoiditis, sinusitis  10/28 CXR showed improved bilateral perihilar consolidations suggesting improved pneumonia  RP2 negative  Repeat UA negative  Legionella urine antigen negative  UE and LE Dopplers show no evidence of acute/chronic DVT  CT CAP showed decreased left greater than right bibasilar opacities, likely representing combination of atelectasis and pneumonia. Thickening on left proximal posterior thigh muscle with ill-defined linear hyperenhancement likely infectious vs inflammatory. Small nodular rim-enhancing focus in lower uterus/cervix possible small fibroid  Procalcitonin improved  10/29 blood cx pending  Pelvic US showed no sonographic findings to correlate CT finding in the lower uterus. Diffuse endometrial thickening and heterogeneity.  Reached out to heme/onc Dr. Judge via [employer] chat. After their review, less suspicious that etiology of her persistent fevers are from occult malignancy/tumor. Her initial hypercalcemia improved. Workup for myeloma was unremarkable. Recent CT CAP showed no evidence of lymphadenopathy or occult malignancy.  Reached out to Gyn  via [employer] chat. After their review, low suspicion about the nodular focus in the TU/cervix since it wasn't visualized on US which is the better imaging nodality for gyn. Regarding the thickened endometrium, she had prior workup for this a year ago which was benign. EMS is down to 5 mm from 9 mm prior. Given patient is not actively bleeding,  no further work-up would be needed. Unlikely that gyn findings are the etiology of her fevers.   CT of left lower extremity showed intramuscular hematoma within the left gluteus young muscle.    Fever etiology likely secondary to hematoma.   Considering drug-induced vs infectious vs. Rheumatologic?. Less likely to be malignancy given work-up overall has been unremarkable.     Plan:  ID on board, appreciate recs  Stop Lovenox  Resumed PO pantoprazole  Avoid nonessential meds  Last fever noted on 10/31/24 at 1514  Monitor temp  Trend WBCs  Tylenol prn

## 2024-11-02 NOTE — ASSESSMENT & PLAN NOTE
CT on 11/1 of left thigh shows collection likely hematoma within the muscle. Patient remains hemodynamically stable, with stable WBC count.  Fever possibly due to intramuscular hematoma.  Exam largely unremarkable and unconcerning with no skin breakdown noted.    Plan:  - No plan for surgical intervention at this time  - Continue abx plan and infectious workup per ID   - Fecal impaction also noted on CT, would consider manual disimpaction  - Rest of care per primary

## 2024-11-02 NOTE — PLAN OF CARE
Problem: PAIN - ADULT  Goal: Verbalizes/displays adequate comfort level or baseline comfort level  Description: Interventions:  - Encourage patient to monitor pain and request assistance  - Assess pain using appropriate pain scale  - Administer analgesics based on type and severity of pain and evaluate response  - Implement non-pharmacological measures as appropriate and evaluate response  - Consider cultural and social influences on pain and pain management  - Notify physician/advanced practitioner if interventions unsuccessful or patient reports new pain  Outcome: Progressing     Problem: INFECTION - ADULT  Goal: Absence or prevention of progression during hospitalization  Description: INTERVENTIONS:  - Assess and monitor for signs and symptoms of infection  - Monitor lab/diagnostic results  - Monitor all insertion sites, i.e. indwelling lines, tubes, and drains  - Monitor endotracheal if appropriate and nasal secretions for changes in amount and color  - [place] appropriate cooling/warming therapies per order  - Administer medications as ordered  - Instruct and encourage patient and family to use good hand hygiene technique  - Identify and instruct in appropriate isolation precautions for identified infection/condition  Outcome: Progressing     Problem: SAFETY ADULT  Goal: Patient will remain free of falls  Description: INTERVENTIONS:  - Educate patient/family on patient safety including physical limitations  - Instruct patient to call for assistance with activity   - Consult OT/PT to assist with strengthening/mobility   - Keep Call bell within reach  - Keep bed low and locked with side rails adjusted as appropriate  - Keep care items and personal belongings within reach  - Initiate and maintain comfort rounds  - Make Fall Risk Sign visible to staff  - Offer Toileting every two hours, in advance of need  - Initiate/Maintain bed alarm  - Obtain necessary fall risk management equipment:   - Apply yellow socks  and bracelet for high fall risk patients  - Consider moving patient to room near nurses station  Outcome: Progressing     Problem: Nutrition/Hydration-ADULT  Goal: Nutrient/Hydration intake appropriate for improving, restoring or maintaining nutritional needs  Description: Monitor and assess patient's nutrition/hydration status for malnutrition. Collaborate with interdisciplinary team and initiate plan and interventions as ordered.  Monitor patient's weight and dietary intake as ordered or per policy. Utilize nutrition screening tool and intervene as necessary. Determine patient's food preferences and provide high-protein, high-caloric foods as appropriate.     INTERVENTIONS:  - Monitor oral intake, urinary output, labs, and treatment plans  - Assess nutrition and hydration status and recommend course of action  - Evaluate amount of meals eaten  - Assist patient with eating if necessary   - Allow adequate time for meals  - Recommend/ encourage appropriate diets, oral nutritional supplements, and vitamin/mineral supplements  - Order, calculate, and assess calorie counts as needed  - Recommend, monitor, and adjust tube feedings and TPN/PPN based on assessed needs  - Assess need for intravenous fluids  - Provide specific nutrition/hydration education as appropriate  - Include patient/family/caregiver in decisions related to nutrition  Outcome: Progressing

## 2024-11-02 NOTE — ASSESSMENT & PLAN NOTE
Lab Results   Component Value Date    HGBA1C 6.2 (H) 08/13/2024       Recent Labs     11/01/24  1234 11/01/24  1756 11/02/24  0017 11/02/24  0547   POCGLU 200* 132 122 161*       Blood Sugar Average: Last 72 hrs:  (P) [id number]  Home regimen: regular insulin 4 units twice daily at 6PM and midnight  10/22 Glucose levels elevated overnight, requiring insulin gtt    Plan  Continue PTA regimen of regular insulin 4 units twice daily (6PM and midnight)  Nutrition on board, appreciate recs  Glucerna available again as of 10/31. Will switch tube feeds back to Glucerna   If Glucerna is not available, switch to vital 1.2 AF @ 80 mL/hr x12 hours per nutrition recs.  Continue sliding scale Algorithm 1 q6h  Monitor blood glucose  Goal -180 while admitted, adjust insulin regimen as appropriate

## 2024-11-02 NOTE — ASSESSMENT & PLAN NOTE
Recent Labs     10/31/24  0453 11/01/24  0531 11/02/24  0543   HGB 8.3* 8.6* 7.6*   Baseline: 9-11  Iron studies done, suggestive of iron deficiency vs anemia of chronic disease  Daily CBC  Continue PTA ferrous sulfate  Resumed PO pantoprazole  Transfuse if hemoglobin less than 7

## 2024-11-02 NOTE — ASSESSMENT & PLAN NOTE
Lab Results   Component Value Date    HGBA1C 6.2 (H) 08/13/2024     Recent Labs     11/01/24  1234 11/01/24  1756 11/02/24  0017 11/02/24  0547   POCGLU 200* 132 122 161*   This is a risk factor for infection.  Well-controlled in this patient.  - Maintain euglycemia to improve WBC function

## 2024-11-02 NOTE — CONSULTS
Consultation - Surgery-General   Name: Terrie Alicea 64 y.o. female I MRN: [id number]  Unit/Bed#: S -01 I Date of Admission: 10/18/2024   Date of Service: 11/2/2024 I Hospital Day: 15   Inpatient consult to Acute Care Surgery  Consult performed by: Nedra Lloyd MD  Consult ordered by: Melonie Coleman MD        Physician Requesting Evaluation: Nicolas Lopez DO   Reason for Evaluation / Principal Problem: Fever, hematoma    Assessment & Plan  Fever of unknown origin   CT on 11/1 of left thigh shows collection likely hematoma within the muscle. Patient remains hemodynamically stable, with stable WBC count.  Fever possibly due to intramuscular hematoma.  Exam largely unremarkable and unconcerning with no skin breakdown noted.    Plan:  - No plan for surgical intervention at this time  - Continue abx plan and infectious workup per ID   - Fecal impaction also noted on CT, would consider manual disimpaction  - Rest of care per primary    Please contact the SecureChat role for the Surgery-General service with any questions/concerns.    History of Present Illness   Terrie Alicea is a 64 y.o. female who presents with fever of unknown origin. At baseline patient is non verbal, totally dependent on care, resides in intermediate care facility.  Fever workup thus far has been largely unremarkable, infectious disease team is on board. General Surgery team consulted for evaluation of left thigh intramuscular hematoma noted on CT on 11/1.  WBC stable at 11.5 from 11.5, hemoglobin 7.6 from 8.6 from 8.3.  Most recent fever was on 10/31 with a Tmax of 101.1 at 3 PM.    Review of Systems   Unable to perform ROS: Patient nonverbal     I have reviewed the patient's PMH, PSH, Social History, Family History, Meds, and Allergies  Historical Information   Past Medical History:   Diagnosis Date    Acute metabolic encephalopathy 12/11/2015    Adjustment disorder     SCAR (acute kidney injury) (HCC) 05/10/2024    Altered  mental status 12/11/2015    Anemia     Bipolar 1 disorder (HCC)     Cerebral palsy (HCC)     Chronic hypernatremia 02/06/2016    Closed fracture of left hip (HCC) 01/19/2016    Closed left hip fracture (HCC)     no surgery    Constipation     Dehydration 02/20/2016    Developmental delay     Diabetes mellitus (HCC)     Difficulty walking     Disease of thyroid gland     Diverticulosis     Dysphagia     Worsening    Fracture of multiple ribs of right side     Herpes zoster without complication 06/02/2021    Hip fracture (HCC) 07/26/2015    left    Hypercalcemia 08/19/2021    Hyperlipidemia     Hypernatremia 12/28/2018    Hypotension     Impulse control disorder     Incontinence     Intellectual disability due to developmental disorder, unspecified     Microalbuminuria     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)     Osteopathia     Osteoporosis     Peripheral neuropathy     Pneumonia 12/28/2018    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HCC)     Thrombocytopenia ([facility]) 07/31/2015     Past Surgical History:   Procedure Laterality Date    ABDOMINAL SURGERY      COLECTOMY MIN      re-anastomosis 7/22/16    COLON SURGERY  1/31/16    COLONOSCOPY N/A 07/21/2016    Procedure: COLONOSCOPY;  Surgeon: Rosalino Jacome MD;  Location: [facility];  Service:     COLONOSCOPY N/A 01/31/2016    Procedure: COLONOSCOPY;  Surgeon: Rosalino Jacome MD;  [facility];  Service:     COLONOSCOPY N/A 07/25/2017    Procedure: COLONOSCOPY;  Surgeon: Rosalino Jacome MD;  Location: [facility];  Service: Colorectal    COLOSTOMY      EXPLORATORY LAPAROTOMY W/ BOWEL RESECTION N/A 01/31/2016    Procedure: exploratory laparotomy, left sigmoidectomy, coloproctostomy, take down splenic flexure, loop colostomy;  Surgeon: Rosalino Jacome MD;  [facility];  Service:     GASTROSTOMY TUBE PLACEMENT N/A 8/26/2024    Procedure: INSERTION GASTROSTOMY TUBE OPEN;  Surgeon: Monroe Jean DO;  [facility];  Service: General    WY CLOSURE ENTEROSTOMY  LG/SMALL INTESTINE N/A 07/22/2016    Procedure: SEGMENTAL COLECTOMY WITH COLOCOLOSTOMY;  Surgeon: Rosalino Jacome MD;  [facility];  Service: Colorectal    UPPER GASTROINTESTINAL ENDOSCOPY       Social History     Tobacco Use    Smoking status: Never    Smokeless tobacco: Never   Vaping Use    Vaping status: Never Used   Substance and Sexual Activity    Alcohol use: Not Currently    Drug use: No    Sexual activity: Never     E-Cigarette/Vaping    E-Cigarette Use Never User      E-Cigarette/Vaping Substances    Nicotine No     THC No     CBD No     Flavoring No     Other No     Unknown No      Family History   Problem Relation Age of Onset    Alcohol abuse Mother     Alcohol abuse Father     Diabetes Sister     No Known Problems Sister     No Known Problems Sister      Social History     Tobacco Use    Smoking status: Never    Smokeless tobacco: Never   Vaping Use    Vaping status: Never Used   Substance and Sexual Activity    Alcohol use: Not Currently    Drug use: No    Sexual activity: Never       Current Facility-Administered Medications:     acetaminophen (TYLENOL) tablet 650 mg, Q6H PRN    ARIPiprazole (ABILIFY) tablet 2 mg, HS    [Held by provider] ascorbic acid (VITAMIN C) tablet 1,000 mg, Daily    baclofen tablet 10 mg, TID    carbidopa-levodopa (SINEMET)  mg per tablet 1 tablet, 4x Daily    citalopram (CeleXA) tablet 20 mg, Daily    [Held by provider] enoxaparin (LOVENOX) subcutaneous injection 40 mg, Q24H ROSEMARY    Ferrous Sulfate oral syrup 300 mg, Every Other Day    influenza vaccine, recombinant (PF) (Flublok) IM injection 0.5 mL, Once    insulin lispro (HumALOG/ADMELOG) 100 units/mL subcutaneous injection 1-5 Units, Q6H ROSEMARY **AND** Fingerstick Glucose (POCT), Q6H    insulin regular (HumuLIN R,NovoLIN R) injection 4 Units, BID    Ketotifen Fumarate (ZADITOR) 0.035 % ophthalmic solution 1 drop, BID PRN    levothyroxine tablet 25 mcg, Early Morning    LORazepam (ATIVAN) tablet 0.5 mg, HS     midodrine (PROAMATINE) tablet 5 mg, TID PRN    oxybutynin (DITROPAN) tablet 5 mg, TID    pantoprazole (PROTONIX) EC tablet 20 mg, Early Morning    polyethylene glycol (MIRALAX) packet 17 g, Daily    pravastatin (PRAVACHOL) tablet 40 mg, Daily With Dinner    senna-docusate sodium (SENOKOT S) 8.6-50 mg per tablet 2 tablet, BID    sodium chloride 0.9 % infusion, Continuous, Last Rate: 100 mL/hr (24 1108)    traZODone (DESYREL) tablet 100 mg, HS PRN    valproic acid (DEPAKENE) oral soln 125 mg, BID  Prior to Admission Medications   Prescriptions Last Dose Informant Patient Reported? Taking?   ARIPiprazole (ABILIFY) 2 mg tablet 10/17/2024 Care Giver Yes Yes   Si tablet by Per G Tube route daily at bedtime   Alcohol Swabs 70 % PADS   No No   Sig: May substitute brand based on insurance coverage. Check glucose TID.   Ascorbic Acid (vitamin C) 1000 MG tablet 10/18/2024  No Yes   Si tablet (1,000 mg total) by Per G Tube route daily   Blood Glucose Monitoring Suppl (OneTouch Verio Reflect) w/Device KIT   No No   Sig: May substitute brand based on insurance coverage. Check glucose TID.   Patient taking differently: QID and PRN   Ferrous Sulfate 300 (60 Fe) mg/5 mL solution 10/18/2024  No Yes   Si mL (300 mg total) by Per G Tube route every other day   Patient taking differently: 300 mg by Per G Tube route every other day   Glucagon (Gvoke HypoPen 2-Pack) 0.5 MG/0.1ML SOAJ   No No   Sig: Inject 1 mg under the skin every 15 (fifteen) minutes as needed (for symptomatic blood glucose <60 and/or if patient is unconcious or seizing) Call 911 after use of pen and then notify Endocrinology provider   Gvoke HypoPen 2-Pack 1 MG/0.2ML SOAJ   Yes No   Insulin Pen Needle (BD Pen Needle Yue 2nd Gen) 32G X 4 MM MISC   No No   Sig: For use with insulin pen. Pharmacy may dispense brand covered by insurance.   LORazepam (Ativan) 0.5 mg tablet 10/17/2024  No Yes   Si tablet (0.5 mg total) by Per G Tube route daily at  bedtime for 10 days Hold for sedation   NovoLIN R FlexPen 100 UNIT/ML SOPN   Yes No   OneTouch Delica Lancets 33G MISC   No No   Sig: May substitute brand based on insurance coverage. Check glucose TID.   Refresh Liquigel 1 % GEL 10/18/2024 Care Giver Yes Yes   Si (two) times a day 1 drop Bid bilaterally   Vitamins A & D (VITAMIN A & D) ointment 10/18/2024 Care Giver Yes Yes   Sig: Apply topically as needed for skin irritation   baclofen 10 mg tablet 10/18/2024  No Yes   Si tablet (10 mg total) by Per G Tube route 3 (three) times a day   calcium carbonate (OS-MORRIS) 600 MG tablet 10/18/2024  No Yes   Si tablet (600 mg total) by Per G Tube route 2 (two) times a day   calcium carbonate (OS-MORRIS) 600 MG tablet   Yes No   Si,500 mg   Patient not taking: Reported on 10/10/2024   carbidopa-levodopa (SINEMET CR)  mg TBCR per ER tablet 10/18/2024 Care Giver No Yes   Sig: TAKE 2 TABLETS BY MOUTH (50/200MG) TWICE A DAY (PARKINSONS DISEASE)   Patient taking differently: Take 2 tablets by mouth 2 (two) times a day Crushed for Via G tube   cholecalciferol (VITAMIN D3) 1,000 units tablet 10/18/2024  No Yes   Si tablet (1,000 Units total) by Per G Tube route daily   citalopram (CeleXA) 20 mg tablet 10/18/2024  No Yes   Si tablet (20 mg total) by Per G Tube route daily   escitalopram (LEXAPRO) 10 mg tablet   Yes No   glucose 40 %   No No   Sig: DISSOLVE IN WARM WATER and administer via peg tube as needed for asymptomatic for blood sugars less than 60 mg/dL and notify endocrine provider. Check blood glucose level in 15 minutes and repeat administration if blood glucose levels are less than 100. Repeat process until blood glucose level is above 100.   glucose blood (OneTouch Verio) test strip   No No   Sig: May substitute brand based on insurance coverage. Check glucose TID.   Patient taking differently: daily as needed QID and PRN   guaiFENesin (DIABETIC TUSSIN EX) 100 MG/5ML oral liquid   No No   Sig: 10  mL (200 mg total) by Per G Tube route 4 (four) times a day as needed for cough or congestion   hydrocortisone 1 % cream  Care Giver No No   Sig: Apply topically to affected area twice daily as needed for rash   insulin regular (HumuLIN R,NovoLIN R) 100 units/mL injection 10/18/2024  No Yes   Sig: Inject 4 units subcutaneously at 6 PM and midnight while tube feeds are running   insulin regular (HumuLIN R,NovoLIN R) 100 units/mL injection 10/18/2024  No Yes   Sig: Inject 1-5 Units under the skin every 6 (six) hours if 0 - 179 = 0; 180 - 224 = 1 unit; 225 - 299 = 2 unit; 300 - 374 = 3 unit; 375 - 449 = 4 unit; 450+ = 5 unit.  Call MD for BS less than 60 or greater than 450   lansoprazole (PREVACID SOLUTAB) 15 mg disintegrating tablet 10/18/2024  No Yes   Si tablet (15 mg total) by Per G Tube route daily Please dissolve in water.   levothyroxine 25 mcg tablet 10/18/2024  No Yes   Si tablet (25 mcg total) by Per G Tube route daily TAKE 1 TABLET BY MOUTH 1 HOUR BEFORE STARTING TUBE FEEDS (HYPOTHYROIDISM)   methenamine hippurate (HIPREX) 1 g tablet 10/18/2024  No Yes   Sig: Crush 1 tablet two times a day per peg tube   midodrine (PROAMATINE) 5 mg tablet 10/18/2024  No Yes   Si tablet (5 mg total) by Per G Tube route 3 (three) times a day For hypotension, hold for systolic blood pressure greater than 110.   Patient taking differently: 5 mg by Per G Tube route 3 (three) times a day before meals For hypotension, hold for systolic blood pressure greater than 110.   neomycin-bacitracin-polymyxin b (NEOSPORIN) ointment  Care Giver Yes No   Sig: Apply 1 application topically 2 (two) times a day as needed Apply to affected area BID PRN   olopatadine HCl (PATADAY) 0.2 % opth drops  Care Giver Yes No   Sig: Administer 1 drop to both eyes as needed Instill 1 drop into affected eyes daily PRN for eye redness   oxybutynin (DITROPAN) 5 mg tablet 10/18/2024  No Yes   Si tablet (5 mg total) by Per G Tube route 3 (three)  times a day   polyethylene glycol (GLYCOLAX) 17 GM/SCOOP powder 10/18/2024  No Yes   Si g by Per G Tube route daily   Patient taking differently: 17 g by Per G Tube route daily Hold one day for loose stools   simvastatin (ZOCOR) 20 mg tablet 10/18/2024  No Yes   Sig: Daily at 4:00 PM.   traZODone (DESYREL) 100 mg tablet 10/17/2024  No Yes   Si tablet (100 mg total) by Per G Tube route daily at bedtime   Patient taking differently: 100 mg by Per G Tube route daily at bedtime   valproic acid (DEPAKENE) 250 MG/5ML soln 10/18/2024  No Yes   Si.5 mL (125 mg total) by Per G Tube route 2 (two) times a day   Patient taking differently: 125 mg by Per G Tube route 2 (two) times a day      Facility-Administered Medications: None     Ingrezza [valbenazine tosylate], Valbenazine, and Keflex [cephalexin]    Objective :  Temp:  [97.6 °F (36.4 °C)-98.9 °F (37.2 °C)] 98.9 °F (37.2 °C)  HR:  [60-76] 68  BP: ()/(48-62) 108/55  Resp:  [15-19] 19  SpO2:  [96 %-100 %] 98 %      Physical Exam  Constitutional:       General: She is not in acute distress.  HENT:      Head: Normocephalic and atraumatic.      Right Ear: External ear normal.      Left Ear: External ear normal.      Nose: Nose normal.      Mouth/Throat:      Pharynx: Oropharynx is clear.   Eyes:      Extraocular Movements: Extraocular movements intact.   Cardiovascular:      Rate and Rhythm: Normal rate.   Pulmonary:      Effort: Pulmonary effort is normal.   Abdominal:      General: Abdomen is flat.   Musculoskeletal:      Cervical back: Normal range of motion.      Comments: Contractures   Skin:     Comments: No skin breakdown noted over proposed hematoma.  Covered sacral decubitus ulcer noted.   Neurological:      Mental Status: Mental status is at baseline.   Psychiatric:         Mood and Affect: Mood normal.         Lab Results: I have reviewed the following results:  Recent Labs     24  0531 24  0543   WBC 11.50* 11.55*   HGB 8.6* 7.6*   HCT  27.0* 23.4*    244   SODIUM 138  --    K 4.7  --      --    CO2 29  --    BUN 30*  --    CREATININE 1.02  --    GLUC 174*  --    AST 28  --    ALT 14  --    ALB 3.3*  --    TBILI 0.21  --    ALKPHOS 44  --        Imaging Results Review: I reviewed radiology reports from this admission including: CT LLE.  Other Study Results Review: No additional pertinent studies reviewed.    VTE Pharmacologic Prophylaxis: VTE covered by:  [Held by provider] enoxaparin, Subcutaneous, 40 mg at 11/01/24 1025     VTE Mechanical Prophylaxis: sequential compression device

## 2024-11-03 LAB
ANION GAP SERPL CALCULATED.3IONS-SCNC: 2 MMOL/L (ref 4–13)
BACTERIA BLD CULT: NORMAL
BACTERIA BLD CULT: NORMAL
BUN SERPL-MCNC: 27 MG/DL (ref 5–25)
CALCIUM SERPL-MCNC: 9.7 MG/DL (ref 8.4–10.2)
CHLORIDE SERPL-SCNC: 103 MMOL/L (ref 96–108)
CO2 SERPL-SCNC: 33 MMOL/L (ref 21–32)
CREAT SERPL-MCNC: 1.05 MG/DL (ref 0.6–1.3)
ERYTHROCYTE [DISTWIDTH] IN BLOOD BY AUTOMATED COUNT: 14.6 % (ref 11.6–15.1)
GFR SERPL CREATININE-BSD FRML MDRD: 57 ML/MIN/1.73SQ M
GLUCOSE SERPL-MCNC: 142 MG/DL (ref 65–140)
GLUCOSE SERPL-MCNC: 164 MG/DL (ref 65–140)
GLUCOSE SERPL-MCNC: 190 MG/DL (ref 65–140)
GLUCOSE SERPL-MCNC: 200 MG/DL (ref 65–140)
GLUCOSE SERPL-MCNC: 231 MG/DL (ref 65–140)
HCT VFR BLD AUTO: 27.5 % (ref 34.8–46.1)
HGB BLD-MCNC: 9.1 G/DL (ref 11.5–15.4)
MCH RBC QN AUTO: 34.5 PG (ref 26.8–34.3)
MCHC RBC AUTO-ENTMCNC: 33.1 G/DL (ref 31.4–37.4)
MCV RBC AUTO: 104 FL (ref 82–98)
PLATELET # BLD AUTO: 304 THOUSANDS/UL (ref 149–390)
PMV BLD AUTO: 11.3 FL (ref 8.9–12.7)
POTASSIUM SERPL-SCNC: 4.9 MMOL/L (ref 3.5–5.3)
RBC # BLD AUTO: 2.64 MILLION/UL (ref 3.81–5.12)
SODIUM SERPL-SCNC: 138 MMOL/L (ref 135–147)
WBC # BLD AUTO: 9.55 THOUSAND/UL (ref 4.31–10.16)

## 2024-11-03 PROCEDURE — 80048 BASIC METABOLIC PNL TOTAL CA: CPT

## 2024-11-03 PROCEDURE — 82948 REAGENT STRIP/BLOOD GLUCOSE: CPT

## 2024-11-03 PROCEDURE — 99232 SBSQ HOSP IP/OBS MODERATE 35: CPT | Performed by: STUDENT IN AN ORGANIZED HEALTH CARE EDUCATION/TRAINING PROGRAM

## 2024-11-03 PROCEDURE — 85027 COMPLETE CBC AUTOMATED: CPT

## 2024-11-03 PROCEDURE — 99233 SBSQ HOSP IP/OBS HIGH 50: CPT | Performed by: INTERNAL MEDICINE

## 2024-11-03 RX ADMIN — INSULIN LISPRO 2 UNITS: 100 INJECTION, SOLUTION INTRAVENOUS; SUBCUTANEOUS at 17:33

## 2024-11-03 RX ADMIN — BACLOFEN 10 MG: 10 TABLET ORAL at 17:19

## 2024-11-03 RX ADMIN — CITALOPRAM HYDROBROMIDE 20 MG: 20 TABLET, FILM COATED ORAL at 10:31

## 2024-11-03 RX ADMIN — VALPROIC ACID 125 MG: 250 SOLUTION ORAL at 21:05

## 2024-11-03 RX ADMIN — LEVOTHYROXINE SODIUM 25 MCG: 25 TABLET ORAL at 05:22

## 2024-11-03 RX ADMIN — OXYBUTYNIN CHLORIDE 5 MG: 5 TABLET ORAL at 17:33

## 2024-11-03 RX ADMIN — INSULIN HUMAN 4 UNITS: 100 INJECTION, SOLUTION PARENTERAL at 17:33

## 2024-11-03 RX ADMIN — CARBIDOPA AND LEVODOPA 1 TABLET: 25; 100 TABLET ORAL at 13:00

## 2024-11-03 RX ADMIN — INSULIN LISPRO 1 UNITS: 100 INJECTION, SOLUTION INTRAVENOUS; SUBCUTANEOUS at 00:24

## 2024-11-03 RX ADMIN — BACLOFEN 10 MG: 10 TABLET ORAL at 21:05

## 2024-11-03 RX ADMIN — VALPROIC ACID 125 MG: 250 SOLUTION ORAL at 10:31

## 2024-11-03 RX ADMIN — CARBIDOPA AND LEVODOPA 1 TABLET: 25; 100 TABLET ORAL at 22:39

## 2024-11-03 RX ADMIN — OXYBUTYNIN CHLORIDE 5 MG: 5 TABLET ORAL at 21:05

## 2024-11-03 RX ADMIN — INSULIN HUMAN 4 UNITS: 100 INJECTION, SOLUTION PARENTERAL at 00:25

## 2024-11-03 RX ADMIN — LORAZEPAM 0.5 MG: 0.5 TABLET ORAL at 22:39

## 2024-11-03 RX ADMIN — OXYBUTYNIN CHLORIDE 5 MG: 5 TABLET ORAL at 10:31

## 2024-11-03 RX ADMIN — BACLOFEN 10 MG: 10 TABLET ORAL at 10:31

## 2024-11-03 RX ADMIN — INSULIN LISPRO 2 UNITS: 100 INJECTION, SOLUTION INTRAVENOUS; SUBCUTANEOUS at 12:21

## 2024-11-03 RX ADMIN — CARBIDOPA AND LEVODOPA 1 TABLET: 25; 100 TABLET ORAL at 10:31

## 2024-11-03 RX ADMIN — INSULIN LISPRO 1 UNITS: 100 INJECTION, SOLUTION INTRAVENOUS; SUBCUTANEOUS at 06:05

## 2024-11-03 RX ADMIN — PRAVASTATIN SODIUM 40 MG: 40 TABLET ORAL at 17:19

## 2024-11-03 RX ADMIN — CARBIDOPA AND LEVODOPA 1 TABLET: 25; 100 TABLET ORAL at 17:19

## 2024-11-03 NOTE — ASSESSMENT & PLAN NOTE
Recent Labs     11/02/24  0543 11/02/24  1356 11/03/24  0916   HGB 7.6* 7.9* 9.1*   Baseline: 9-11  Iron studies done, suggestive of iron deficiency vs anemia of chronic disease  Daily CBC  Continue PTA ferrous sulfate  Resumed PO pantoprazole  Transfuse if hemoglobin less than 7

## 2024-11-03 NOTE — ASSESSMENT & PLAN NOTE
Lab Results   Component Value Date    HGBA1C 6.2 (H) 08/13/2024       Recent Labs     11/02/24  1735 11/03/24  0022 11/03/24  0604 11/03/24  1211   POCGLU 154* 164* 190* 231*       Blood Sugar Average: Last 72 hrs:  (P) [id number]  Home regimen: regular insulin 4 units twice daily at 6PM and midnight  10/22 Glucose levels elevated overnight, requiring insulin gtt    Plan  Continue PTA regimen of regular insulin 4 units twice daily (6PM and midnight)  Nutrition on board, appreciate recs  Glucerna available again as of 10/31. Will switch tube feeds back to Glucerna   If Glucerna is not available, switch to vital 1.2 AF @ 80 mL/hr x12 hours per nutrition recs.  Continue sliding scale Algorithm 1 q6h  Monitor blood glucose  Goal -180 while admitted, adjust insulin regimen as appropriate

## 2024-11-03 NOTE — PLAN OF CARE
Problem: Prexisting or High Potential for Compromised Skin Integrity  Goal: Skin integrity is maintained or improved  Description: INTERVENTIONS:  - Identify patients at risk for skin breakdown  - Assess and monitor skin integrity  - Assess and monitor nutrition and hydration status  - Monitor labs   - Assess for incontinence   - Turn and reposition patient  - Assist with mobility/ambulation  - Relieve pressure over bony prominences  - Avoid friction and shearing  - Provide appropriate hygiene as needed including keeping skin clean and dry  - Evaluate need for skin moisturizer/barrier cream  - Collaborate with interdisciplinary team   - Patient/family teaching  - Consider wound care consult   Outcome: Progressing     Problem: PAIN - ADULT  Goal: Verbalizes/displays adequate comfort level or baseline comfort level  Description: Interventions:  - Encourage patient to monitor pain and request assistance  - Assess pain using appropriate pain scale  - Administer analgesics based on type and severity of pain and evaluate response  - Implement non-pharmacological measures as appropriate and evaluate response  - Consider cultural and social influences on pain and pain management  - Notify physician/advanced practitioner if interventions unsuccessful or patient reports new pain  Outcome: Progressing     Problem: INFECTION - ADULT  Goal: Absence or prevention of progression during hospitalization  Description: INTERVENTIONS:  - Assess and monitor for signs and symptoms of infection  - Monitor lab/diagnostic results  - Monitor all insertion sites, i.e. indwelling lines, tubes, and drains  - Monitor endotracheal if appropriate and nasal secretions for changes in amount and color  - [place] appropriate cooling/warming therapies per order  - Administer medications as ordered  - Instruct and encourage patient and family to use good hand hygiene technique  - Identify and instruct in appropriate isolation precautions for  identified infection/condition  Outcome: Progressing  Goal: Absence of fever/infection during neutropenic period  Description: INTERVENTIONS:  - Monitor WBC    Outcome: Progressing     Problem: SAFETY ADULT  Goal: Patient will remain free of falls  Description: INTERVENTIONS:  - Educate patient/family on patient safety including physical limitations  - Instruct patient to call for assistance with activity   - Consult OT/PT to assist with strengthening/mobility   - Keep Call bell within reach  - Keep bed low and locked with side rails adjusted as appropriate  - Keep care items and personal belongings within reach  - Initiate and maintain comfort rounds  - Make Fall Risk Sign visible to staff  - Offer Toileting every  Hours, in advance of need  - Initiate/Maintain alarm  - Obtain necessary fall risk management equipment:   - Apply yellow socks and bracelet for high fall risk patients  - Consider moving patient to room near nurses station  Outcome: Progressing  Goal: Maintain or return to baseline ADL function  Description: INTERVENTIONS:  -  Assess patient's ability to carry out ADLs; assess patient's baseline for ADL function and identify physical deficits which impact ability to perform ADLs (bathing, care of mouth/teeth, toileting, grooming, dressing, etc.)  - Assess/evaluate cause of self-care deficits   - Assess range of motion  - Assess patient's mobility; develop plan if impaired  - Assess patient's need for assistive devices and provide as appropriate  - Encourage maximum independence but intervene and supervise when necessary  - Involve family in performance of ADLs  - Assess for home care needs following discharge   - Consider OT consult to assist with ADL evaluation and planning for discharge  - Provide patient education as appropriate  Outcome: Progressing  Goal: Maintains/Returns to pre admission functional level  Description: INTERVENTIONS:  - Perform AM-PAC 6 Click Basic Mobility/ Daily Activity  assessment daily.  - Set and communicate daily mobility goal to care team and patient/family/caregiver.   - Collaborate with rehabilitation services on mobility goals if consulted  - Perform Range of Motion  times a day.  - Reposition patient every  hours.  - Dangle patient  times a day  - Stand patient  times a day  - Ambulate patient  times a day  - Out of bed to chair  times a day   - Out of bed for meals  times a day  - Out of bed for toileting  - Record patient progress and toleration of activity level   Outcome: Progressing     Problem: DISCHARGE PLANNING  Goal: Discharge to home or other facility with appropriate resources  Description: INTERVENTIONS:  - Identify barriers to discharge w/patient and caregiver  - Arrange for needed discharge resources and transportation as appropriate  - Identify discharge learning needs (meds, wound care, etc.)  - Arrange for interpretive services to assist at discharge as needed  - Refer to Case Management Department for coordinating discharge planning if the patient needs post-hospital services based on physician/advanced practitioner order or complex needs related to functional status, cognitive ability, or social support system  Outcome: Progressing     Problem: Knowledge Deficit  Goal: Patient/family/caregiver demonstrates understanding of disease process, treatment plan, medications, and discharge instructions  Description: Complete learning assessment and assess knowledge base.  Interventions:  - Provide teaching at level of understanding  - Provide teaching via preferred learning methods  Outcome: Progressing     Problem: Nutrition/Hydration-ADULT  Goal: Nutrient/Hydration intake appropriate for improving, restoring or maintaining nutritional needs  Description: Monitor and assess patient's nutrition/hydration status for malnutrition. Collaborate with interdisciplinary team and initiate plan and interventions as ordered.  Monitor patient's weight and dietary intake as  ordered or per policy. Utilize nutrition screening tool and intervene as necessary. Determine patient's food preferences and provide high-protein, high-caloric foods as appropriate.     INTERVENTIONS:  - Monitor oral intake, urinary output, labs, and treatment plans  - Assess nutrition and hydration status and recommend course of action  - Evaluate amount of meals eaten  - Assist patient with eating if necessary   - Allow adequate time for meals  - Recommend/ encourage appropriate diets, oral nutritional supplements, and vitamin/mineral supplements  - Order, calculate, and assess calorie counts as needed  - Recommend, monitor, and adjust tube feedings and TPN/PPN based on assessed needs  - Assess need for intravenous fluids  - Provide specific nutrition/hydration education as appropriate  - Include patient/family/caregiver in decisions related to nutrition  Outcome: Progressing     Problem: Potential for Falls  Goal: Patient will remain free of falls  Description: INTERVENTIONS:  - Educate patient/family on patient safety including physical limitations  - Instruct patient to call for assistance with activity   - Consult OT/PT to assist with strengthening/mobility   - Keep Call bell within reach  - Keep bed low and locked with side rails adjusted as appropriate  - Keep care items and personal belongings within reach  - Initiate and maintain comfort rounds  - Make Fall Risk Sign visible to staff  - Offer Toileting every  Hours, in advance of need  - Initiate/Maintain alarm  - Obtain necessary fall risk management equipment:   - Apply yellow socks and bracelet for high fall risk patients  - Consider moving patient to room near nurses station  Outcome: Progressing     Problem: METABOLIC, FLUID AND ELECTROLYTES - ADULT  Goal: Electrolytes maintained within normal limits  Description: INTERVENTIONS:  - Monitor labs and assess patient for signs and symptoms of electrolyte imbalances  - Administer electrolyte replacement as  ordered  - Monitor response to electrolyte replacements, including repeat lab results as appropriate  - Instruct patient on fluid and nutrition as appropriate  Outcome: Progressing  Goal: Fluid balance maintained  Description: INTERVENTIONS:  - Monitor labs   - Monitor I/O and WT  - Instruct patient on fluid and nutrition as appropriate  - Assess for signs & symptoms of volume excess or deficit  Outcome: Progressing  Goal: Glucose maintained within target range  Description: INTERVENTIONS:  - Monitor Blood Glucose as ordered  - Assess for signs and symptoms of hyperglycemia and hypoglycemia  - Administer ordered medications to maintain glucose within target range  - Assess nutritional intake and initiate nutrition service referral as needed  Outcome: Progressing

## 2024-11-03 NOTE — ASSESSMENT & PLAN NOTE
Lab Results   Component Value Date    HGBA1C 6.2 (H) 08/13/2024     Recent Labs     11/02/24  1121 11/02/24  1735 11/03/24  0022 11/03/24  0604   POCGLU 147* 154* 164* 190*   This is a risk factor for infection.  Well-controlled in this patient.  - Maintain euglycemia to improve WBC function

## 2024-11-03 NOTE — PROGRESS NOTES
Progress Note - Infectious Disease   Name: Terrie Alicea 64 y.o. female I MRN: [id number]  Unit/Bed#: S -01 I Date of Admission: 10/18/2024   Date of Service: 11/3/2024 I Hospital Day: 16    Assessment & Plan  Fever of unknown origin  Patient persistently febrile since ~hospital day 3.  Blood cultures negative, multiple sets.  UA initially with TNTC WBC, Morganella on culture.  COVID/flu/RSV and Rp2 panel negative.  No LFT abnormalities.  CTAP with multifocal pulmonary infiltrates, no abdominal pathology and PEG site clean.  She completed 7 day course of ceftriaxone for pneumonia vs UTI.   Follow up chest XR showed improved infiltrates 10/28.  CTAP with contrast repeated 10/29 - largely resolving pulmonary opacities with call of posterior thigh muscle enhancement.  Full skin exam unremarkable, including posterior thigh - no fluctuance or erythema on exam.  CT of sinuses did not show pathology.  All 4 extremity dopplers negative for DVT and lung VQ scan low probability of PE.  Resides in a care facility, so shouldn't have any unusual exposures.  Urine Legionella negative.  Normal bowel movements.  Tolerating tube feeds.  She is receiving all home medications, aside from heparin, which was changed to enoxaparin on 10/29.  Procalcitonin down-trended.  Repeat CT on 11/1 of left thigh shows enlarging collection likely hematoma within the muscle.  She remains hemodynamically stable, with stable WBC count.  Fever likely due to intramuscular hematoma.  - Let's continue to monitor off antibiotics  - Would hold enoxaparin and use mechanical DVT ppx  - Also with fecal impaction (could also be causing fevers) - may need manual disimpaction, and would escalate bowel regimen   - OK for discharge from ID standpoint  Hematoma  Left posterior thigh intramuscular hematoma.  Possibly from prophylactic heparin enoxaparin received during this admission.  Likely explains her fever.  - Monitor off antibiotics as above  Cerebral  palsy (HCC)  Continue home medications  Bipolar disorder (HCC)  Continue home medications  Diabetes mellitus type 2, insulin dependent (HCC)  Lab Results   Component Value Date    HGBA1C 6.2 (H) 2024     Recent Labs     24  1121 24  1735 24  0022 24  0604   POCGLU 147* 154* 164* 190*   This is a risk factor for infection.  Well-controlled in this patient.  - Maintain euglycemia to improve WBC function  Parkinson's disease (HCC)  Continue home medication.  Hypothyroidism  TFT's were normal earlier in admission.    I have discussed the above management plan in detail with the primary service, Dr Lopez, who agrees with the plan to monitor off antibiotics.      I will stop following for now, but please call back with any questions or concerns.    I have performed an extensive review of the medical records in Epic including review of the notes, radiographs, and laboratory results     Antibiotics:  Off antibiotics day 6    24 Hour Events:  No fevers in the last 24 hours.    Subjective:  Patient non-verbal.  Resting comfortably this morning.    Objective:  Vitals:  Temp:  [98.1 °F (36.7 °C)-98.8 °F (37.1 °C)] 98.8 °F (37.1 °C)  HR:  [65-78] 78  Resp:  [18-19] 19  BP: ()/(60-70) 99/70  SpO2:  [96 %-98 %] 97 %  Temp (24hrs), Av.5 °F (36.9 °C), Min:98.1 °F (36.7 °C), Max:98.8 °F (37.1 °C)  Current: Temperature: 98.8 °F (37.1 °C)      Physical Exam:   General: Appears chronically ill, in no apparent distress  HEENT:  Normocephalic, sclera anicteric  Lungs:  Unlabored respirations   Abdomen:  Soft, nontender, nondistended  Extremities:  No cyanosis or edema  Skin:  No rashes, lesions.  No tenderness, fluctuance, or erythema of posterior thigh  Neuro:  Awakens to voice      Labs:   All pertinent labs and imaging studies were personally reviewed  Results from last 7 days   Lab Units 24  0916 24  1356 24  0543 24  0531   WBC Thousand/uL 9.55  --  11.55* 11.50*    HEMOGLOBIN g/dL 9.1* 7.9* 7.6* 8.6*   PLATELETS Thousands/uL 304  --  244 282     Results from last 7 days   Lab Units 11/01/24  0531 10/31/24  0453 10/30/24  0855 10/30/24  0734   SODIUM mmol/L 138 137  --  130*   POTASSIUM mmol/L 4.7 4.7   < > 5.8*   CHLORIDE mmol/L 102 107  --  101   CO2 mmol/L 29 25  --  25   BUN mg/dL 30* 29*  --  27*   CREATININE mg/dL 1.02 0.87  --  1.01   EGFR ml/min/1.73sq m 59 71  --  59   CALCIUM mg/dL 9.8 8.3*  --  8.3*   AST U/L 28  --   --  36   ALT U/L 14  --   --  18   ALK PHOS U/L 44  --   --  43    < > = values in this interval not displayed.     Results from last 7 days   Lab Units 10/29/24  0516   PROCALCITONIN ng/ml 0.28*     Results from last 7 days   Lab Units 10/29/24  0516   CRP mg/L 7.4*     Results from last 7 days   Lab Units 10/30/24  0855 10/29/24  0516   FERRITIN ng/mL 440* 447*             Microbiology:  Results from last 7 days   Lab Units 10/29/24  1148 10/28/24  1432   BLOOD CULTURE  No Growth After 4 Days.  No Growth After 4 Days.  --    LEGIONELLA URINARY ANTIGEN   --  Negative       Imaging:      Leander Dodd MD  [facility]

## 2024-11-03 NOTE — ASSESSMENT & PLAN NOTE
Left posterior thigh intramuscular hematoma.  Possibly from prophylactic heparin enoxaparin received during this admission.  Likely explains her fever.  - Monitor off antibiotics as above

## 2024-11-03 NOTE — ASSESSMENT & PLAN NOTE
Patient persistently febrile since ~hospital day 3.  Blood cultures negative, multiple sets.  UA initially with TNTC WBC, Morganella on culture.  COVID/flu/RSV and Rp2 panel negative.  No LFT abnormalities.  CTAP with multifocal pulmonary infiltrates, no abdominal pathology and PEG site clean.  She completed 7 day course of ceftriaxone for pneumonia vs UTI.   Follow up chest XR showed improved infiltrates 10/28.  CTAP with contrast repeated 10/29 - largely resolving pulmonary opacities with call of posterior thigh muscle enhancement.  Full skin exam unremarkable, including posterior thigh - no fluctuance or erythema on exam.  CT of sinuses did not show pathology.  All 4 extremity dopplers negative for DVT and lung VQ scan low probability of PE.  Resides in a care facility, so shouldn't have any unusual exposures.  Urine Legionella negative.  Normal bowel movements.  Tolerating tube feeds.  She is receiving all home medications, aside from heparin, which was changed to enoxaparin on 10/29.  Procalcitonin down-trended.  Repeat CT on 11/1 of left thigh shows enlarging collection likely hematoma within the muscle.  She remains hemodynamically stable, with stable WBC count.  Fever likely due to intramuscular hematoma.  - Let's continue to monitor off antibiotics  - Would hold enoxaparin and use mechanical DVT ppx  - Also with fecal impaction (could also be causing fevers) - may need manual disimpaction, and would escalate bowel regimen   - OK for discharge from ID standpoint

## 2024-11-03 NOTE — PLAN OF CARE
Problem: PAIN - ADULT  Goal: Verbalizes/displays adequate comfort level or baseline comfort level  Description: Interventions:  - Encourage patient to monitor pain and request assistance  - Assess pain using appropriate pain scale  - Administer analgesics based on type and severity of pain and evaluate response  - Implement non-pharmacological measures as appropriate and evaluate response  - Consider cultural and social influences on pain and pain management  - Notify physician/advanced practitioner if interventions unsuccessful or patient reports new pain  Outcome: Progressing     Problem: INFECTION - ADULT  Goal: Absence or prevention of progression during hospitalization  Description: INTERVENTIONS:  - Assess and monitor for signs and symptoms of infection  - Monitor lab/diagnostic results  - Monitor all insertion sites, i.e. indwelling lines, tubes, and drains  - Monitor endotracheal if appropriate and nasal secretions for changes in amount and color  - [place] appropriate cooling/warming therapies per order  - Administer medications as ordered  - Instruct and encourage patient and family to use good hand hygiene technique  - Identify and instruct in appropriate isolation precautions for identified infection/condition  Outcome: Progressing     Problem: SAFETY ADULT  Goal: Patient will remain free of falls  Description: INTERVENTIONS:  - Educate patient/family on patient safety including physical limitations  - Instruct patient to call for assistance with activity   - Consult OT/PT to assist with strengthening/mobility   - Keep Call bell within reach  - Keep bed low and locked with side rails adjusted as appropriate  - Keep care items and personal belongings within reach  - Initiate and maintain comfort rounds  - Make Fall Risk Sign visible to staff  - Offer Toileting every two hours, in advance of need  - Initiate/Maintain bed alarm  - Obtain necessary fall risk management equipment:   - Apply yellow socks  and bracelet for high fall risk patients  - Consider moving patient to room near nurses station  Outcome: Progressing     Problem: Prexisting or High Potential for Compromised Skin Integrity  Goal: Skin integrity is maintained or improved  Description: INTERVENTIONS:  - Identify patients at risk for skin breakdown  - Assess and monitor skin integrity  - Assess and monitor nutrition and hydration status  - Monitor labs   - Assess for incontinence   - Turn and reposition patient  - Assist with mobility/ambulation  - Relieve pressure over bony prominences  - Avoid friction and shearing  - Provide appropriate hygiene as needed including keeping skin clean and dry  - Evaluate need for skin moisturizer/barrier cream  - Collaborate with interdisciplinary team   - Patient/family teaching  - Consider wound care consult   Outcome: Progressing     Problem: Nutrition/Hydration-ADULT  Goal: Nutrient/Hydration intake appropriate for improving, restoring or maintaining nutritional needs  Description: Monitor and assess patient's nutrition/hydration status for malnutrition. Collaborate with interdisciplinary team and initiate plan and interventions as ordered.  Monitor patient's weight and dietary intake as ordered or per policy. Utilize nutrition screening tool and intervene as necessary. Determine patient's food preferences and provide high-protein, high-caloric foods as appropriate.     INTERVENTIONS:  - Monitor oral intake, urinary output, labs, and treatment plans  - Assess nutrition and hydration status and recommend course of action  - Evaluate amount of meals eaten  - Assist patient with eating if necessary   - Allow adequate time for meals  - Recommend/ encourage appropriate diets, oral nutritional supplements, and vitamin/mineral supplements  - Order, calculate, and assess calorie counts as needed  - Recommend, monitor, and adjust tube feedings and TPN/PPN based on assessed needs  - Assess need for intravenous fluids  -  Provide specific nutrition/hydration education as appropriate  - Include patient/family/caregiver in decisions related to nutrition  Outcome: Progressing     Problem: METABOLIC, FLUID AND ELECTROLYTES - ADULT  Goal: Electrolytes maintained within normal limits  Description: INTERVENTIONS:  - Monitor labs and assess patient for signs and symptoms of electrolyte imbalances  - Administer electrolyte replacement as ordered  - Monitor response to electrolyte replacements, including repeat lab results as appropriate  - Instruct patient on fluid and nutrition as appropriate  Outcome: Progressing     Problem: METABOLIC, FLUID AND ELECTROLYTES - ADULT  Goal: Fluid balance maintained  Description: INTERVENTIONS:  - Monitor labs   - Monitor I/O and WT  - Instruct patient on fluid and nutrition as appropriate  - Assess for signs & symptoms of volume excess or deficit  Outcome: Progressing     Problem: METABOLIC, FLUID AND ELECTROLYTES - ADULT  Goal: Glucose maintained within target range  Description: INTERVENTIONS:  - Monitor Blood Glucose as ordered  - Assess for signs and symptoms of hyperglycemia and hypoglycemia  - Administer ordered medications to maintain glucose within target range  - Assess nutritional intake and initiate nutrition service referral as needed  Outcome: Progressing

## 2024-11-03 NOTE — PROGRESS NOTES
Progress Note - Hospitalist   Name: Terrie Alicea 64 y.o. female I MRN: [id number]  Unit/Bed#: S -01 I Date of Admission: 10/18/2024   Date of Service: 11/3/2024 I Hospital Day: 16    Assessment & Plan  Fever of unknown origin  Patient met sepsis criteria on 10/21 with fever, tachycardia, leukocytosis suspected source of UTI and pneumonia. Treated with ceftriaxone. However, patient continued to have recurrent fevers, leukocytosis, despite antibiotic management. Completed 7-day course of ceftriaxone on 10/28.  Further work-up of fever as below:  CT CAP - Multilobar pneumonia. No acute findings in the abdomen or pelvis. Prominent rectal vault stool without bowel obstruction.  CT temporal bones - no evidence of mastoiditis, sinusitis  10/28 CXR showed improved bilateral perihilar consolidations suggesting improved pneumonia  RP2 negative  Repeat UA negative  Legionella urine antigen negative  UE and LE Dopplers show no evidence of acute/chronic DVT  CT CAP showed decreased left greater than right bibasilar opacities, likely representing combination of atelectasis and pneumonia. Thickening on left proximal posterior thigh muscle with ill-defined linear hyperenhancement likely infectious vs inflammatory. Small nodular rim-enhancing focus in lower uterus/cervix possible small fibroid  Procalcitonin improved  10/29 blood cx pending  Pelvic US showed no sonographic findings to correlate CT finding in the lower uterus. Diffuse endometrial thickening and heterogeneity.  Reached out to heme/onc Dr. Judge via [employer] secure chat. After their review, less suspicious that etiology of her persistent fevers are from occult malignancy/tumor. Her initial hypercalcemia improved. Workup for myeloma was unremarkable. Recent CT CAP showed no evidence of lymphadenopathy or occult malignancy.  Reached out to Gyn  via [employer] chat. After their review, low suspicion about the nodular focus in the TU/cervix since it wasn't  visualized on US which is the better imaging nodality for gyn. Regarding the thickened endometrium, she had prior workup for this a year ago which was benign. EMS is down to 5 mm from 9 mm prior. Given patient is not actively bleeding, no further work-up would be needed. Unlikely that gyn findings are the etiology of her fevers.   CT of left lower extremity showed intramuscular hematoma within the left gluteus young muscle.    Fever etiology likely secondary to hematoma.       Plan:  ID on board, appreciate recs  Stop Lovenox  Resumed PO pantoprazole  Avoid nonessential meds  Last fever noted on 10/31/24 at 1514  Monitor temp  Trend WBCs  Tylenol prn  Patient finished course of IV antibiotics cleared for discharge as she has remained afebrile  Unfortunately facility unable to accept patient until tomorrow.  Discussed with POA plan for 3 PM pickup tomorrow  Hematoma  CT of left lower extremity showed intramuscular hematoma within the left gluteus young muscle.  Surgery consulted  No plan for surgical intervention at this time  Iron deficiency anemia  Recent Labs     11/02/24  0543 11/02/24  1356 11/03/24  0916   HGB 7.6* 7.9* 9.1*   Baseline: 9-11  Iron studies done, suggestive of iron deficiency vs anemia of chronic disease  Daily CBC  Continue PTA ferrous sulfate  Resumed PO pantoprazole  Transfuse if hemoglobin less than 7  Diabetes mellitus type 2, insulin dependent (HCC)  Lab Results   Component Value Date    HGBA1C 6.2 (H) 08/13/2024       Recent Labs     11/02/24  1735 11/03/24  0022 11/03/24  0604 11/03/24  1211   POCGLU 154* 164* 190* 231*       Blood Sugar Average: Last 72 hrs:  (P) [id number]  Home regimen: regular insulin 4 units twice daily at 6PM and midnight  10/22 Glucose levels elevated overnight, requiring insulin gtt    Plan  Continue PTA regimen of regular insulin 4 units twice daily (6PM and midnight)  Nutrition on board, appreciate recs  Glucerna available again as of 10/31. Will  switch tube feeds back to Glucerna   If Glucerna is not available, switch to vital 1.2 AF @ 80 mL/hr x12 hours per nutrition recs.  Continue sliding scale Algorithm 1 q6h  Monitor blood glucose  Goal -180 while admitted, adjust insulin regimen as appropriate  Cerebral palsy (HCC)  At baseline is non verbal , totally dependent on care, resides in intermediate care facility  Continue PTA baclofen TID  Bipolar disorder (HCC)  Continue PTA Depakene and Abilify  Hypothyroidism  Continue PTA levothyroxine  TSH and T4 within normal limits  Parkinson's disease (HCC)  On Sinemet CR  mg 2 tablets BID at home  Switched to Sinemet immediate release  mg 1 tablet 4 times daily while admitted    VTE Pharmacologic Prophylaxis: VTE Score: 4 Moderate Risk (Score 3-4) - Pharmacological DVT Prophylaxis Contraindicated. Sequential Compression Devices Ordered.    Mobility:   Basic Mobility Inpatient Raw Score: 6  JH-HLM Goal: 2: Bed activities/Dependent transfer  JH-HLM Achieved: 2: Bed activities/Dependent transfer  JH-HLM Goal achieved. Continue to encourage appropriate mobility.    Patient Centered Rounds: I performed bedside rounds with nursing staff today.   Discussions with Specialists or Other Care Team Provider: case management, id    Education and Discussions with Family / Patient: Updated  (POA) via phone.    Current Length of Stay: 16 day(s)  Current Patient Status: Inpatient   Certification Statement: The patient will continue to require additional inpatient hospital stay due to pending safe dispo  Discharge Plan: Anticipate discharge tomorrow to prior assisted or independent living facility.    Code Status: Level 1 - Full Code    Subjective   Patient seen and examined at bedside.  Appears in no acute distress.  Nonverbal at baseline    Objective :  Temp:  [98.1 °F (36.7 °C)-98.8 °F (37.1 °C)] 98.8 °F (37.1 °C)  HR:  [65-78] 78  BP: ()/(60-70) 99/70  Resp:  [18-19] 19  SpO2:  [96 %-98 %]  97 %  O2 Device: None (Room air)    Body mass index is 17.81 kg/m².     Input and Output Summary (last 24 hours):     Intake/Output Summary (Last 24 hours) at 11/3/2024 1338  Last data filed at 11/3/2024 0856  Gross per 24 hour   Intake 0 ml   Output --   Net 0 ml       Physical Exam  Vitals and nursing note reviewed.   Constitutional:       General: She is not in acute distress.     Appearance: She is well-developed.      Comments: Chronically ill-appearing   HENT:      Head: Normocephalic and atraumatic.   Eyes:      Conjunctiva/sclera: Conjunctivae normal.   Cardiovascular:      Rate and Rhythm: Normal rate and regular rhythm.      Heart sounds: No murmur heard.  Pulmonary:      Effort: Pulmonary effort is normal. No respiratory distress.      Breath sounds: Normal breath sounds.   Abdominal:      Palpations: Abdomen is soft.      Tenderness: There is no abdominal tenderness.   Musculoskeletal:         General: No swelling.      Cervical back: Neck supple.   Skin:     General: Skin is warm and dry.   Neurological:      Mental Status: She is alert. Mental status is at baseline.      Comments: Contracted upper and lower extremities   Psychiatric:         Mood and Affect: Mood normal.           Lines/Drains:              Lab Results: I have reviewed the following results:   Results from last 7 days   Lab Units 11/03/24  0916 11/02/24  1356 11/02/24  0543 10/30/24  0734 10/29/24  0516   WBC Thousand/uL 9.55  --  11.55*   < > 11.63*   HEMOGLOBIN g/dL 9.1*   < > 7.6*   < > 10.2*   HEMATOCRIT % 27.5*   < > 23.4*   < > 31.9*   PLATELETS Thousands/uL 304  --  244   < > 244   BANDS PCT %  --   --   --   --  8   SEGS PCT %  --   --  77*   < >  --    LYMPHO PCT %  --   --  15   < > 27   MONO PCT %  --   --  5   < > 5   EOS PCT %  --   --  2   < > 1    < > = values in this interval not displayed.     Results from last 7 days   Lab Units 11/03/24  1054 11/01/24  0531   SODIUM mmol/L 138 138   POTASSIUM mmol/L 4.9 4.7    CHLORIDE mmol/L 103 102   CO2 mmol/L 33* 29   BUN mg/dL 27* 30*   CREATININE mg/dL 1.05 1.02   ANION GAP mmol/L 2* 7   CALCIUM mg/dL 9.7 9.8   ALBUMIN g/dL  --  3.3*   TOTAL BILIRUBIN mg/dL  --  0.21   ALK PHOS U/L  --  44   ALT U/L  --  14   AST U/L  --  28   GLUCOSE RANDOM mg/dL 200* 174*         Results from last 7 days   Lab Units 11/03/24  1211 11/03/24  0604 11/03/24  0022 11/02/24  1735 11/02/24  1121 11/02/24  0547 11/02/24  0017 11/01/24  1756 11/01/24  1234 11/01/24  0630 11/01/24  0106 10/31/24  1753   POC GLUCOSE mg/dl 231* 190* 164* 154* 147* 161* 122 132 200* 164* 226* 194*         Results from last 7 days   Lab Units 10/29/24  0516   PROCALCITONIN ng/ml 0.28*       Recent Cultures (last 7 days):   Results from last 7 days   Lab Units 10/29/24  1148 10/28/24  1432   BLOOD CULTURE  No Growth After 4 Days.  No Growth After 4 Days.  --    LEGIONELLA URINARY ANTIGEN   --  Negative       Imaging Results Review: I reviewed radiology reports from this admission including: CT left lower extremity with contrast.  Other Study Results Review: EKG was reviewed.     Last 24 Hours Medication List:     Current Facility-Administered Medications:     acetaminophen (TYLENOL) tablet 650 mg, Q6H PRN    ARIPiprazole (ABILIFY) tablet 2 mg, HS    [Held by provider] ascorbic acid (VITAMIN C) tablet 1,000 mg, Daily    baclofen tablet 10 mg, TID    carbidopa-levodopa (SINEMET)  mg per tablet 1 tablet, 4x Daily    citalopram (CeleXA) tablet 20 mg, Daily    [Held by provider] enoxaparin (LOVENOX) subcutaneous injection 40 mg, Q24H ROSEMARY    Ferrous Sulfate oral syrup 300 mg, Every Other Day    influenza vaccine, recombinant (PF) (Flublok) IM injection 0.5 mL, Once    insulin lispro (HumALOG/ADMELOG) 100 units/mL subcutaneous injection 1-5 Units, Q6H ROSEMARY **AND** Fingerstick Glucose (POCT), Q6H    insulin regular (HumuLIN R,NovoLIN R) injection 4 Units, BID    Ketotifen Fumarate (ZADITOR) 0.035 % ophthalmic solution 1 drop,  BID PRN    levothyroxine tablet 25 mcg, Early Morning    LORazepam (ATIVAN) tablet 0.5 mg, HS    midodrine (PROAMATINE) tablet 5 mg, TID PRN    oxybutynin (DITROPAN) tablet 5 mg, TID    pantoprazole (PROTONIX) EC tablet 20 mg, Early Morning    pravastatin (PRAVACHOL) tablet 40 mg, Daily With Dinner    traZODone (DESYREL) tablet 100 mg, HS PRN    valproic acid (DEPAKENE) oral soln 125 mg, BID    Administrative Statements   Today, Patient Was Seen By: Nicolas Lopez, DO  I have spent a total time of 30 minutes in caring for this patient on the day of the visit/encounter including Diagnostic results, Prognosis, Risks and benefits of tx options, Instructions for management, Counseling / Coordination of care, Documenting in the medical record, Reviewing / ordering tests, medicine, procedures  , and Obtaining or reviewing history  .    **Please Note: This note may have been constructed using a voice recognition system.**

## 2024-11-03 NOTE — ASSESSMENT & PLAN NOTE
Patient met sepsis criteria on 10/21 with fever, tachycardia, leukocytosis suspected source of UTI and pneumonia. Treated with ceftriaxone. However, patient continued to have recurrent fevers, leukocytosis, despite antibiotic management. Completed 7-day course of ceftriaxone on 10/28.  Further work-up of fever as below:  CT CAP - Multilobar pneumonia. No acute findings in the abdomen or pelvis. Prominent rectal vault stool without bowel obstruction.  CT temporal bones - no evidence of mastoiditis, sinusitis  10/28 CXR showed improved bilateral perihilar consolidations suggesting improved pneumonia  RP2 negative  Repeat UA negative  Legionella urine antigen negative  UE and LE Dopplers show no evidence of acute/chronic DVT  CT CAP showed decreased left greater than right bibasilar opacities, likely representing combination of atelectasis and pneumonia. Thickening on left proximal posterior thigh muscle with ill-defined linear hyperenhancement likely infectious vs inflammatory. Small nodular rim-enhancing focus in lower uterus/cervix possible small fibroid  Procalcitonin improved  10/29 blood cx pending  Pelvic US showed no sonographic findings to correlate CT finding in the lower uterus. Diffuse endometrial thickening and heterogeneity.  Reached out to heme/onc Dr. Judge via [facility] chat. After their review, less suspicious that etiology of her persistent fevers are from occult malignancy/tumor. Her initial hypercalcemia improved. Workup for myeloma was unremarkable. Recent CT CAP showed no evidence of lymphadenopathy or occult malignancy.  Reached out to Gyn  via [facility] chat. After their review, low suspicion about the nodular focus in the TU/cervix since it wasn't visualized on US which is the better imaging nodality for gyn. Regarding the thickened endometrium, she had prior workup for this a year ago which was benign. EMS is down to 5 mm from 9 mm prior. Given patient is not actively bleeding,  no further work-up would be needed. Unlikely that gyn findings are the etiology of her fevers.   CT of left lower extremity showed intramuscular hematoma within the left gluteus young muscle.    Fever etiology likely secondary to hematoma.       Plan:  ID on board, appreciate recs  Stop Lovenox  Resumed PO pantoprazole  Avoid nonessential meds  Last fever noted on 10/31/24 at 1514  Monitor temp  Trend WBCs  Tylenol prn  Patient finished course of IV antibiotics cleared for discharge as she has remained afebrile  Unfortunately facility unable to accept patient until tomorrow.  Discussed with POA plan for 3 PM pickup tomorrow

## 2024-11-04 VITALS
TEMPERATURE: 98.1 F | RESPIRATION RATE: 17 BRPM | HEART RATE: 84 BPM | BODY MASS INDEX: 17.81 KG/M2 | SYSTOLIC BLOOD PRESSURE: 117 MMHG | OXYGEN SATURATION: 97 % | DIASTOLIC BLOOD PRESSURE: 69 MMHG | WEIGHT: 88.18 LBS

## 2024-11-04 LAB
DME PARACHUTE DELIVERY DATE ACTUAL: NORMAL
DME PARACHUTE DELIVERY DATE REQUESTED: NORMAL
DME PARACHUTE ITEM DESCRIPTION: NORMAL
DME PARACHUTE ORDER STATUS: NORMAL
DME PARACHUTE SUPPLIER NAME: NORMAL
DME PARACHUTE SUPPLIER PHONE: NORMAL
GLUCOSE SERPL-MCNC: 202 MG/DL (ref 65–140)
GLUCOSE SERPL-MCNC: 228 MG/DL (ref 65–140)
GLUCOSE SERPL-MCNC: 243 MG/DL (ref 65–140)

## 2024-11-04 PROCEDURE — 99239 HOSP IP/OBS DSCHRG MGMT >30: CPT | Performed by: STUDENT IN AN ORGANIZED HEALTH CARE EDUCATION/TRAINING PROGRAM

## 2024-11-04 PROCEDURE — 82948 REAGENT STRIP/BLOOD GLUCOSE: CPT

## 2024-11-04 RX ADMIN — OXYBUTYNIN CHLORIDE 5 MG: 5 TABLET ORAL at 08:49

## 2024-11-04 RX ADMIN — INSULIN LISPRO 1 UNITS: 100 INJECTION, SOLUTION INTRAVENOUS; SUBCUTANEOUS at 13:47

## 2024-11-04 RX ADMIN — CARBIDOPA AND LEVODOPA 1 TABLET: 25; 100 TABLET ORAL at 13:47

## 2024-11-04 RX ADMIN — PRAVASTATIN SODIUM 40 MG: 40 TABLET ORAL at 15:58

## 2024-11-04 RX ADMIN — INSULIN LISPRO 2 UNITS: 100 INJECTION, SOLUTION INTRAVENOUS; SUBCUTANEOUS at 06:54

## 2024-11-04 RX ADMIN — INSULIN LISPRO 2 UNITS: 100 INJECTION, SOLUTION INTRAVENOUS; SUBCUTANEOUS at 00:28

## 2024-11-04 RX ADMIN — LEVOTHYROXINE SODIUM 25 MCG: 25 TABLET ORAL at 06:48

## 2024-11-04 RX ADMIN — INSULIN HUMAN 4 UNITS: 100 INJECTION, SOLUTION PARENTERAL at 00:28

## 2024-11-04 RX ADMIN — BACLOFEN 10 MG: 10 TABLET ORAL at 15:59

## 2024-11-04 RX ADMIN — BACLOFEN 10 MG: 10 TABLET ORAL at 08:49

## 2024-11-04 RX ADMIN — OXYBUTYNIN CHLORIDE 5 MG: 5 TABLET ORAL at 15:58

## 2024-11-04 RX ADMIN — CARBIDOPA AND LEVODOPA 1 TABLET: 25; 100 TABLET ORAL at 08:49

## 2024-11-04 RX ADMIN — FERROUS SULFATE 300 MG: 300 SOLUTION ORAL at 08:49

## 2024-11-04 RX ADMIN — ARIPIPRAZOLE 2 MG: 2 TABLET ORAL at 00:27

## 2024-11-04 RX ADMIN — CITALOPRAM HYDROBROMIDE 20 MG: 20 TABLET, FILM COATED ORAL at 08:49

## 2024-11-04 RX ADMIN — VALPROIC ACID 125 MG: 250 SOLUTION ORAL at 08:49

## 2024-11-04 NOTE — ASSESSMENT & PLAN NOTE
Lab Results   Component Value Date    HGBA1C 6.2 (H) 08/13/2024       Recent Labs     11/03/24  1211 11/03/24  1839 11/04/24  0021 11/04/24  0653   POCGLU 231* 142* 228* 243*       Blood Sugar Average: Last 72 hrs:  (P) [id number]  Home regimen: regular insulin 4 units twice daily at 6PM and midnight    Plan  Continue PTA regimen of regular insulin 4 units twice daily (6PM and midnight)

## 2024-11-04 NOTE — DISCHARGE INSTR - AVS FIRST PAGE
Dear Terrie Alicea,     It was our pleasure to care for you here at [facility].  It is our hope that we were always able to exceed the expected standards for your care during your stay.  You were hospitalized due to hypercalcemia and UTI.  You were cared for on the fourth floor by Melonie Coleman MD under the service of Nicolas Lopez DO with the [facility] who covers for your primary care physician (PCP), Gunnar Sylvester DO, while you were hospitalized.  If you have any questions or concerns related to this hospitalization, you may contact us at .  For follow up as well as any medication refills, we recommend that you follow up with your primary care physician.  A registered nurse will reach out to you by phone within a few days after your discharge to answer any additional questions that you may have after going home.  However, at this time we provide for you here, the most important instructions / recommendations at discharge:     Notable Medication Adjustments -   STOP taking Calcium Carbonate 600 mg BID  STOP taking Vitamin D3 1000 units daily. Your vitamin D level was normal during this admission, which is why this medication was stopped, but we recommend that you have it checked at your PCP's discretion.   Continue the rest of your medications as prescribed  Testing Required after Discharge -   Serum calcium level in one week  Vitamin D level in one week  Basic Metabolic Panel in one week to check kidney function  Complete Blood Count in one week to check hemoglobin level  ** Please contact your PCP to request testing orders for any of the testing recommended here **  Important follow up information -   Please follow-up with your PCP at the soonest appointment for continuation of care  Other Instructions -   Please turn patient frequently to offload pressure on her buttock/ischial regions  Please review this entire after visit summary  as additional general instructions including medication list, appointments, activity, diet, any pertinent wound care, and other additional recommendations from your care team that may be provided for you.      Sincerely,     Melonie Coleman MD

## 2024-11-04 NOTE — PLAN OF CARE
Problem: Prexisting or High Potential for Compromised Skin Integrity  Goal: Skin integrity is maintained or improved  Description: INTERVENTIONS:  - Identify patients at risk for skin breakdown  - Assess and monitor skin integrity  - Assess and monitor nutrition and hydration status  - Monitor labs   - Assess for incontinence   - Turn and reposition patient  - Assist with mobility/ambulation  - Relieve pressure over bony prominences  - Avoid friction and shearing  - Provide appropriate hygiene as needed including keeping skin clean and dry  - Evaluate need for skin moisturizer/barrier cream  - Collaborate with interdisciplinary team   - Patient/family teaching  - Consider wound care consult   Outcome: Progressing     Problem: PAIN - ADULT  Goal: Verbalizes/displays adequate comfort level or baseline comfort level  Description: Interventions:  - Encourage patient to monitor pain and request assistance  - Assess pain using appropriate pain scale  - Administer analgesics based on type and severity of pain and evaluate response  - Implement non-pharmacological measures as appropriate and evaluate response  - Consider cultural and social influences on pain and pain management  - Notify physician/advanced practitioner if interventions unsuccessful or patient reports new pain  Outcome: Progressing     Problem: INFECTION - ADULT  Goal: Absence or prevention of progression during hospitalization  Description: INTERVENTIONS:  - Assess and monitor for signs and symptoms of infection  - Monitor lab/diagnostic results  - Monitor all insertion sites, i.e. indwelling lines, tubes, and drains  - Monitor endotracheal if appropriate and nasal secretions for changes in amount and color  - [place] appropriate cooling/warming therapies per order  - Administer medications as ordered  - Instruct and encourage patient and family to use good hand hygiene technique  - Identify and instruct in appropriate isolation precautions for  identified infection/condition  Outcome: Progressing  Goal: Absence of fever/infection during neutropenic period  Description: INTERVENTIONS:  - Monitor WBC    Outcome: Progressing     Problem: SAFETY ADULT  Goal: Patient will remain free of falls  Description: INTERVENTIONS:  - Educate patient/family on patient safety including physical limitations  - Instruct patient to call for assistance with activity   - Consult OT/PT to assist with strengthening/mobility   - Keep Call bell within reach  - Keep bed low and locked with side rails adjusted as appropriate  - Keep care items and personal belongings within reach  - Initiate and maintain comfort rounds  - Make Fall Risk Sign visible to staff  - Offer Toileting every  Hours, in advance of need  - Initiate/Maintain alarm  - Obtain necessary fall risk management equipment:   - Apply yellow socks and bracelet for high fall risk patients  - Consider moving patient to room near nurses station  Outcome: Progressing  Goal: Maintain or return to baseline ADL function  Description: INTERVENTIONS:  -  Assess patient's ability to carry out ADLs; assess patient's baseline for ADL function and identify physical deficits which impact ability to perform ADLs (bathing, care of mouth/teeth, toileting, grooming, dressing, etc.)  - Assess/evaluate cause of self-care deficits   - Assess range of motion  - Assess patient's mobility; develop plan if impaired  - Assess patient's need for assistive devices and provide as appropriate  - Encourage maximum independence but intervene and supervise when necessary  - Involve family in performance of ADLs  - Assess for home care needs following discharge   - Consider OT consult to assist with ADL evaluation and planning for discharge  - Provide patient education as appropriate  Outcome: Progressing  Goal: Maintains/Returns to pre admission functional level  Description: INTERVENTIONS:  - Perform AM-PAC 6 Click Basic Mobility/ Daily Activity  assessment daily.  - Set and communicate daily mobility goal to care team and patient/family/caregiver.   - Collaborate with rehabilitation services on mobility goals if consulted  - Perform Range of Motion  times a day.  - Reposition patient every  hours.  - Dangle patient  times a day  - Stand patient  times a day  - Ambulate patient  times a day  - Out of bed to chair  times a day   - Out of bed for meals  times a day  - Out of bed for toileting  - Record patient progress and toleration of activity level   Outcome: Progressing     Problem: DISCHARGE PLANNING  Goal: Discharge to home or other facility with appropriate resources  Description: INTERVENTIONS:  - Identify barriers to discharge w/patient and caregiver  - Arrange for needed discharge resources and transportation as appropriate  - Identify discharge learning needs (meds, wound care, etc.)  - Arrange for interpretive services to assist at discharge as needed  - Refer to Case Management Department for coordinating discharge planning if the patient needs post-hospital services based on physician/advanced practitioner order or complex needs related to functional status, cognitive ability, or social support system  Outcome: Progressing     Problem: Knowledge Deficit  Goal: Patient/family/caregiver demonstrates understanding of disease process, treatment plan, medications, and discharge instructions  Description: Complete learning assessment and assess knowledge base.  Interventions:  - Provide teaching at level of understanding  - Provide teaching via preferred learning methods  Outcome: Progressing     Problem: Nutrition/Hydration-ADULT  Goal: Nutrient/Hydration intake appropriate for improving, restoring or maintaining nutritional needs  Description: Monitor and assess patient's nutrition/hydration status for malnutrition. Collaborate with interdisciplinary team and initiate plan and interventions as ordered.  Monitor patient's weight and dietary intake as  ordered or per policy. Utilize nutrition screening tool and intervene as necessary. Determine patient's food preferences and provide high-protein, high-caloric foods as appropriate.     INTERVENTIONS:  - Monitor oral intake, urinary output, labs, and treatment plans  - Assess nutrition and hydration status and recommend course of action  - Evaluate amount of meals eaten  - Assist patient with eating if necessary   - Allow adequate time for meals  - Recommend/ encourage appropriate diets, oral nutritional supplements, and vitamin/mineral supplements  - Order, calculate, and assess calorie counts as needed  - Recommend, monitor, and adjust tube feedings and TPN/PPN based on assessed needs  - Assess need for intravenous fluids  - Provide specific nutrition/hydration education as appropriate  - Include patient/family/caregiver in decisions related to nutrition  Outcome: Progressing     Problem: Potential for Falls  Goal: Patient will remain free of falls  Description: INTERVENTIONS:  - Educate patient/family on patient safety including physical limitations  - Instruct patient to call for assistance with activity   - Consult OT/PT to assist with strengthening/mobility   - Keep Call bell within reach  - Keep bed low and locked with side rails adjusted as appropriate  - Keep care items and personal belongings within reach  - Initiate and maintain comfort rounds  - Make Fall Risk Sign visible to staff  - Offer Toileting every  Hours, in advance of need  - Initiate/Maintain alarm  - Obtain necessary fall risk management equipment:   - Apply yellow socks and bracelet for high fall risk patients  - Consider moving patient to room near nurses station  Outcome: Progressing     Problem: METABOLIC, FLUID AND ELECTROLYTES - ADULT  Goal: Electrolytes maintained within normal limits  Description: INTERVENTIONS:  - Monitor labs and assess patient for signs and symptoms of electrolyte imbalances  - Administer electrolyte replacement as  ordered  - Monitor response to electrolyte replacements, including repeat lab results as appropriate  - Instruct patient on fluid and nutrition as appropriate  Outcome: Progressing  Goal: Fluid balance maintained  Description: INTERVENTIONS:  - Monitor labs   - Monitor I/O and WT  - Instruct patient on fluid and nutrition as appropriate  - Assess for signs & symptoms of volume excess or deficit  Outcome: Progressing  Goal: Glucose maintained within target range  Description: INTERVENTIONS:  - Monitor Blood Glucose as ordered  - Assess for signs and symptoms of hyperglycemia and hypoglycemia  - Administer ordered medications to maintain glucose within target range  - Assess nutritional intake and initiate nutrition service referral as needed  Outcome: Progressing

## 2024-11-04 NOTE — ASSESSMENT & PLAN NOTE
Recent Labs     11/02/24  0543 11/02/24  1356 11/03/24  0916   HGB 7.6* 7.9* 9.1*   Baseline: 9-11  Iron studies done, suggestive of iron deficiency vs anemia of chronic disease  Continue PTA ferrous sulfate

## 2024-11-04 NOTE — CASE MANAGEMENT
Case Management Discharge Planning Note    Patient name Terrie Alicea  Location S /S -01 MRN [id number]  : 1960 Date 2024       Current Admission Date: 10/18/2024  Current Admission Diagnosis:Fever of unknown origin   Patient Active Problem List    Diagnosis Date Noted Date Diagnosed    Hematoma 2024     History of aspiration pneumonia 10/10/2024     Mood disturbance 2024     Fever of unknown origin 09/15/2024     Mood disturbances 2024     Severe protein-calorie malnutrition (HCC) 2024     Functional quadriplegia (HCC) 2024     Protein-calorie malnutrition, unspecified severity (HCC) 2024     Hospital discharge follow-up 2024     SIRS (systemic inflammatory response syndrome) ([facility]) 2024     Developmental delay      Preop examination 2023     Cataract 2023     Internal hemorrhoids 2023     Contracture of right hand 2023     Need for shingles vaccine 2023     Viral illness 2022     Dystonic cerebral palsy (HCC) 2022     Cervical dystonia 2022     Parkinson's disease (HCC) 2022     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[facility]) 10/18/2021     Polyneuropathy associated with underlying disease ([facility]) 10/18/2021     Herpes zoster without complication 2021     Abnormal finding on lung imaging 2021     Anemia 2020     Eosinophilic leukocytosis 2020     Underweight 2020     Dysphagia 2020     History of vitamin D deficiency 2020     History of fall 10/28/2019     Breast asymmetry 2019     Hypernatremia 2018     Gait disturbance 2018     Osteoarthritis of both hips 2018     Severe Intellectual disability 2018     Diabetes mellitus type 2, insulin dependent (HCC) 2018     Abnormal albumin 2017     Peripheral neuropathy 2017     Seasonal allergies 2017     Physical deconditioning 2017     Hyperlipidemia 2017      Gastroesophageal reflux disease 03/27/2017     Cerebral palsy (HCC) 03/27/2017     Spasticity 03/27/2017     Ambulatory dysfunction 03/27/2017     Bipolar disorder (HCC) 03/27/2017     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/2016     Weight loss 01/21/2016     Osteoporosis 01/19/2016     Resting tremor 01/19/2016     Acute metabolic encephalopathy 12/11/2015     Gait abnormality 12/11/2015     Other chronic pain 07/30/2015     Postmenopausal vaginal bleeding 07/23/2015     Anxiety 07/15/2015     Menopause 09/25/2014     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/28/2014     Atopic dermatitis 06/02/2014     Dry eye 06/02/2014     Constipation 04/12/2013     Iron deficiency anemia 03/27/2013     Urinary incontinence 03/27/2013     Hypothyroidism 01/10/2013       LOS (days): 17  Geometric Mean LOS (GMLOS) (days):   Days to GMLOS:     OBJECTIVE:  Risk of Unplanned Readmission Score: 47.65         Current admission status: Inpatient   Preferred Pharmacy:   [facility] - [address]  [address]  [address]  [place] [address]  Phone: [phone] Fax: [phone]    [facility]  [address]  [place] [address]  Phone: [phone] Fax: [phone]    [facility] [place], [place] - [address]  [address]  [facility] [place] [address]  Phone: [phone] Fax: [phone]    [facility] - [place] - [address]  [address]  [facility] [address]  Phone: [phone] Fax: [phone]    [place], [place] - [address]  [address] [facility] [address]  Phone: [phone] Fax: [phone]    Primary Care Provider: Gunnar Sylvester DO    Primary Insurance: [employer]  REP  Secondary Insurance: [place] [employer]    DISCHARGE DETAILS:    Discharge planning discussed with:: Patient caregiver, guardian  Freedom of Choice:  Yes  Comments - Freedom of Choice: Return to group home  CM contacted family/caregiver?: Yes (Caregiver and guardian via phone)  Were Treatment Team discharge recommendations reviewed with patient/caregiver?: Yes  Did patient/caregiver verbalize understanding of patient care needs?: Yes  Were patient/caregiver advised of the risks associated with not following Treatment Team discharge recommendations?: Yes    Contacts  Patient Contacts: Lisa Penny (Care Giver), Ella Morgan  Relationship to Patient:: Other (Comment) (Group home director)  Contact Method: Phone  Reason/Outcome: Discharge Planning, Emergency Contact, Continuity of Care    Requested Home Health Care         Is the patient interested in HHC at discharge?: No    DME Referral Provided  Referral made for DME?: No    Other Referral/Resources/Interventions Provided:  Interventions: Other (Specify)  Referral Comments: CM notified by provider that pt is medically stable for dc today. CM placed call to pt group home caregiver Lisa, who reported she will picking pt up around 3pm today. Lisa reported she will reach out to [facility] to determine deliver of test strips. Lisa reported she has test strips/lancets left over as well and believes [facility] order will be delivered today. No further CM needs noted. CM placed call to pt legal guardian Ella Morgan. CM reviewed IMM, Ezra agreeable to dc with no plans to appeal. Ezra aware that caregiver will provide transport around 3pm today. CM notified provider and primary nurse of same.    Would you like to participate in our [facility] service program?  : No - Declined    Treatment Team Recommendation: Group Home, Facility Return  Discharge Destination Plan:: Facility Return, Group Home  Transport at Discharge : Other (Comment) (Group home caregiver)    IMM Given (Date):: 11/04/24  IMM Given to:: Designee  Family notified:: Ella Morgan

## 2024-11-04 NOTE — DISCHARGE SUMMARY
Discharge Summary - Hospitalist   Name: Terrie Alicea 64 y.o. female I MRN: [id number]  Unit/Bed#: S -01 I Date of Admission: 10/18/2024   Date of Service: 11/4/2024 I Hospital Day: 17     Assessment & Plan  Fever of unknown origin  Patient met sepsis criteria on 10/21 with fever, tachycardia, leukocytosis suspected source of UTI and pneumonia. Treated with ceftriaxone. However, patient continued to have recurrent fevers, leukocytosis, despite antibiotic management. Completed 7-day course of ceftriaxone on 10/28.  Further work-up of fever as below:  CT CAP - Multilobar pneumonia. No acute findings in the abdomen or pelvis. Prominent rectal vault stool without bowel obstruction.  CT temporal bones - no evidence of mastoiditis, sinusitis  10/28 CXR showed improved bilateral perihilar consolidations suggesting improved pneumonia  RP2 negative  Repeat UA negative  Legionella urine antigen negative  UE and LE Dopplers show no evidence of acute/chronic DVT  CT CAP showed decreased left greater than right bibasilar opacities, likely representing combination of atelectasis and pneumonia. Thickening on left proximal posterior thigh muscle with ill-defined linear hyperenhancement likely infectious vs inflammatory. Small nodular rim-enhancing focus in lower uterus/cervix possible small fibroid  Procalcitonin improved  10/29 blood cx pending  Pelvic US showed no sonographic findings to correlate CT finding in the lower uterus. Diffuse endometrial thickening and heterogeneity.  Reached out to heme/onc Dr. Judge via [employer] chat. After their review, less suspicious that etiology of her persistent fevers are from occult malignancy/tumor. Her initial hypercalcemia improved. Workup for myeloma was unremarkable. Recent CT CAP showed no evidence of lymphadenopathy or occult malignancy.  Reached out to Gyn  via [employer] chat. After their review, low suspicion about the nodular focus in the TU/cervix since it  wasn't visualized on US which is the better imaging nodality for gyn. Regarding the thickened endometrium, she had prior workup for this a year ago which was benign. EMS is down to 5 mm from 9 mm prior. Given patient is not actively bleeding, no further work-up would be needed. Unlikely that gyn findings are the etiology of her fevers.   CT of left lower extremity showed intramuscular hematoma within the left gluteus young muscle.    Fever etiology likely secondary to hematoma.  She has remained afebrile since 10/31/24 at 1514. Leukocytosis resolved. Hb stable.    Plan:  She is medically cleared for discharge back to facility  Recommend frequent turning to avoid skin breakdown and pressure ulcers on her buttock/ischial regions  Hematoma  CT of left lower extremity showed intramuscular hematoma within the left gluteus young muscle.  No plan for surgical intervention at this time  Avoid anticoagulation.   Iron deficiency anemia  Recent Labs     11/02/24  0543 11/02/24  1356 11/03/24  0916   HGB 7.6* 7.9* 9.1*   Baseline: 9-11  Iron studies done, suggestive of iron deficiency vs anemia of chronic disease  Continue PTA ferrous sulfate  Diabetes mellitus type 2, insulin dependent (HCC)  Lab Results   Component Value Date    HGBA1C 6.2 (H) 08/13/2024       Recent Labs     11/03/24  1211 11/03/24  1839 11/04/24  0021 11/04/24  0653   POCGLU 231* 142* 228* 243*       Blood Sugar Average: Last 72 hrs:  (P) 178.0627295789661448  Home regimen: regular insulin 4 units twice daily at 6PM and midnight    Plan  Continue PTA regimen of regular insulin 4 units twice daily (6PM and midnight)  Cerebral palsy (HCC)  At baseline is non verbal , totally dependent on care, resides in intermediate care facility  Continue PTA baclofen TID  Bipolar disorder (HCC)  Continue PTA Depakene and Abilify  Hypothyroidism  Continue PTA levothyroxine  TSH and T4 within normal limits  Parkinson's disease (HCC)  Continue Sinemet CR  mg 2  tablets BID at home       Medical Problems       Resolved Problems  Date Reviewed: 10/26/2024            Resolved    Acute hypoxic respiratory failure (HCC) 10/25/2024     Resolved by  Keiry Ricardo MD    UTI (urinary tract infection) 11/2/2024     Resolved by  Melonie Coleman MD    Pneumonia 10/29/2024     Resolved by  Leander Dodd MD    Hypercalcemia 10/28/2024     Resolved by  Keiry Ricardo MD    SCAR (acute kidney injury) (HCC) 10/28/2024     Resolved by  Hoa Arevalo MD    Metabolic alkalosis 10/28/2024     Resolved by  Melonie Coleman MD    Hypophosphatemia 10/28/2024     Resolved by  Melonie Coleman MD        Discharging Physician / Practitioner: Melonie Coleman MD  PCP: Gunnar Sylvester DO  Admission Date:   Admission Orders (From admission, onward)       Ordered        10/18/24 2136  INPATIENT ADMISSION  Once                          Discharge Date: 11/04/24    Consultations During Hospital Stay:  Nephrology  Infectious Disease  General Surgery    Procedures Performed:   None    Significant Findings / Test Results:   10/21 Chest Xray - faint perihilar patchiness with subtle obscuration of the right cardiomediastinal silhouette  10/23 Chest Xray - perihilar opacities  10/23 VQ scan - low probability for pulmonary embolus  10/24 CT Chest Abdomen Pelvis - multilobar pneumonia, no acute findings in abdomen or pelvis, prominent rectal vault stool without bowel obstruction  10/27 CT Temporal Bones - no findings to suggest mastoiditis  10/28 Chest Xray - improved bilateral patchy perihilar consolidations  10/28 VAS Venous Duplex Lower Limb - no acute of chronic DVT  10/29 CT Chest Abdomen Pelvis w/ contrast - Decreased left greater than right bibasilar opacities, likely representing combination of atelectasis and pneumonia. Near complete resolution of left upper lobe opacity. Thickening of the left proximal posterior thigh muscle with ill-defined linear hyperenhancement. Small  nodular rim-enhancing focus in the lower uterux/cervix measuring 1.1 cm   10/31 VAS Upper Limb Venous Duplex - no acute or chronic DVT. No superficial thrombophlebitis  10/31 US Pelvis - No discrete sonographic findings to correlate with the CT finding in the lower uterus/cervix. Diffuse endometrial thickening and heterogeneity.  11/1 CT Left Lower Extremity w/ contrast - Region of ill-defined hypodensity within the left gluteus young muscle with peripheral slight increased attenuation. Overall the degree of hyperdensity has further decreased with increased hypodensity more centrally. Given the interval changes, this may reflect an evolving intramuscular hematoma.     Incidental Findings:   None     Test Results Pending at Discharge (will require follow up):   None     Outpatient Tests Requested:  Serum Calcium level  Vitamin D level  Basic Metabolic Panel  Complete Blood Count     Complications:  Sepsis    Reason for Admission: Hypercalcemia    Hospital Course:   Terrie is a 64 y.o woman with a PMH of Cerebral Palsy nonverbal at baseline, Type 2 Diabetes, Parkinson's Disease, Hyperlipidemia, initially presented to the hospital due to abnormal outpatient lab results which showed Calcium level of 14.2. Blood work done in the ED showed hypercalcemia of 14.8 with ionized Calcium of 1.68. Nephrology was consulted. Patient was started on IV fluids for resuscitation and was advised to start Calcitonin. Patient's hypercalcemia improving with fluids and calcitonin x4. Etiology of hypercalcemia likely secondary to dehydration and immobilization in the setting of calcium supplement use. During patient's hospital course, she started to have fevers. She met severe sepsis criteria with her fever, tachycardia, and leukocytosis. Patient also had acute hypoxic respiratory failure requiring 6L of oxygen to maintain O2 saturations of 93%-94%. Work-up at that time was pertinent for positive UA with UCx growing Morganella susceptible  to Ceftriaxone and chest x-ray findings suggestive of likely aspiration pneumonia. Patient was treated with Ceftriaxone. Despite ongoing antibiotic management, patient continued to spike fevers. Further work-up was done to evaluate ongoing fevers. Additional work-ups have been negative for probably infectious etiology. Medications adjusted to rule out drug-induced fever. No acute or chronic DVT was noted on bilateral UE and LE ultrasound. Gyn findings found on imaging were determined to be chronic and less likely to be etiology of ongoing fevers. She was found to have a hematoma on left gluteus young region. Determined to be likely etiology. Anticoagulations on hold. She continued to be afebrile for the past ~48 hours. No leukocytosis on recent lab. Hb stable. Patient overall stable for discharge back to prior facility.    Please see above list of diagnoses and related plan for additional information.     Condition at Discharge: fair    Discharge Day Visit / Exam:   Subjective:  Patient had no acute events overnight. No fevers noted.   Vitals: Blood Pressure: 117/69 (11/04/24 0735)  Pulse: 84 (11/04/24 0735)  Temperature: 98.1 °F (36.7 °C) (11/04/24 0735)  Temp Source: Axillary (11/04/24 0735)  Respirations: 17 (11/04/24 0735)  Weight - Scale: 40 kg (88 lb 2.9 oz) (10/22/24 1100)  SpO2: 97 % (11/04/24 0735)  Physical Exam  Vitals reviewed.   Constitutional:       General: She is not in acute distress.     Appearance: She is well-developed.   HENT:      Head: Normocephalic and atraumatic.   Eyes:      Conjunctiva/sclera: Conjunctivae normal.   Cardiovascular:      Rate and Rhythm: Normal rate and regular rhythm.      Pulses: Normal pulses.      Heart sounds: Normal heart sounds. No murmur heard.  Pulmonary:      Effort: Pulmonary effort is normal. No respiratory distress.      Breath sounds: Normal breath sounds.   Abdominal:      General: There is no distension.      Palpations: Abdomen is soft.      Tenderness:  There is no abdominal tenderness.   Musculoskeletal:         General: No swelling or tenderness.      Cervical back: Neck supple.   Skin:     General: Skin is warm and dry.      Capillary Refill: Capillary refill takes less than 2 seconds.   Neurological:      Mental Status: She is alert. Mental status is at baseline.          Discussion with Family: Updated  (caregiver) at bedside.    Discharge instructions/Information to patient and family:   See after visit summary for information provided to patient and family.      Provisions for Follow-Up Care:  See after visit summary for information related to follow-up care and any pertinent home health orders.      Mobility at time of Discharge:   Basic Mobility Inpatient Raw Score: 6  JH-HLM Goal: 2: Bed activities/Dependent transfer  JH-HLM Achieved: 2: Bed activities/Dependent transfer  HLM Goal achieved. Continue to encourage appropriate mobility.     Disposition:   Other: Prior group home/facility    Planned Readmission: No    Discharge Medications:  See after visit summary for reconciled discharge medications provided to patient and/or family.      Administrative Statements   Discharge Statement:  I have spent a total time of >45 minutes in caring for this patient on the day of the visit/encounter. .    **Please Note: This note may have been constructed using a voice recognition system**

## 2024-11-04 NOTE — ASSESSMENT & PLAN NOTE
CT of left lower extremity showed intramuscular hematoma within the left gluteus young muscle.  No plan for surgical intervention at this time  Avoid anticoagulation.

## 2024-11-04 NOTE — QUICK NOTE
64 y.o. woman with PMH of cerebral palsy nonverbal at baseline, diabetes, Parkinson disease, hyperlipidemia presents with hypercalcemia of 14.2 and SCAR with creatinine 2.8. Due to mental delay unable to obtain any history from patient. The ED patient was noted to be hypercalcemic with a calcium of 14.8 and ionized calcium 1.68. Nephrology was contacted who recommend starting isotonic saline at 100 mL an hour after she received 1 L bolus, patient was also started on calcitonin 1 unit x 1.

## 2024-11-04 NOTE — HOSPITAL COURSE
64 y.o woman with a PMH of Cerebral Palsy nonverbal at baseline, Type 2 Diabetes, Parkinson's Disease, Hyperlipidemia, initially presented to the hospital due to abnormal outpatient lab results which showed Calcium level of 14.2. Blood work done in the ED showed hypercalcemia of 14.8 with ionized Calcium of 1.68. Nephrology was consulted. Patient was started on IV fluids for resuscitation and was advised to start Calcitonin. Patient's hypercalcemia improving with fluids and calcitonin x4. Etiology of hypercalcemia likely secondary to dehydration and immobilization in the setting of calcium supplement use. During patient's hospital course, she started to have fevers. She met severe sepsis criteria with her fever, tachycardia, and leukocytosis. Patient also had acute hypoxic respiratory failure requiring 6L of oxygen to maintain O2 saturations of 93%-94%. Work-up at that time was pertinent for positive UA with UCx growing Morganella susceptible to Ceftriaxone and chest x-ray findings suggestive of likely aspiration pneumonia. Patient was treated with Ceftriaxone. Despite ongoing antibiotic management, patient continued to spike fevers. Further work-up was done to evaluate ongoing fevers. Additional work-ups have been negative for probably infectious etiology. Medications adjusted to rule out drug-induced fever. No acute or chronic DVT was noted on bilateral UE and LE ultrasound. Gyn findings found on imaging were determined to be chronic and less likely to be etiology of ongoing fevers. She was found to have a hematoma on left gluteus young region. Determined to be likely etiology. Anticoagulations on hold. She continued to be afebrile for the past ~48 hours. No leukocytosis on recent lab. Hb stable. Patient overall stable for discharge back to prior facility.

## 2024-11-04 NOTE — PLAN OF CARE
Problem: Prexisting or High Potential for Compromised Skin Integrity  Goal: Skin integrity is maintained or improved  Description: INTERVENTIONS:  - Identify patients at risk for skin breakdown  - Assess and monitor skin integrity  - Assess and monitor nutrition and hydration status  - Monitor labs   - Assess for incontinence   - Turn and reposition patient  - Assist with mobility/ambulation  - Relieve pressure over bony prominences  - Avoid friction and shearing  - Provide appropriate hygiene as needed including keeping skin clean and dry  - Evaluate need for skin moisturizer/barrier cream  - Collaborate with interdisciplinary team   - Patient/family teaching  - Consider wound care consult   Outcome: Adequate for Discharge     Problem: PAIN - ADULT  Goal: Verbalizes/displays adequate comfort level or baseline comfort level  Description: Interventions:  - Encourage patient to monitor pain and request assistance  - Assess pain using appropriate pain scale  - Administer analgesics based on type and severity of pain and evaluate response  - Implement non-pharmacological measures as appropriate and evaluate response  - Consider cultural and social influences on pain and pain management  - Notify physician/advanced practitioner if interventions unsuccessful or patient reports new pain  Outcome: Adequate for Discharge     Problem: INFECTION - ADULT  Goal: Absence or prevention of progression during hospitalization  Description: INTERVENTIONS:  - Assess and monitor for signs and symptoms of infection  - Monitor lab/diagnostic results  - Monitor all insertion sites, i.e. indwelling lines, tubes, and drains  - Monitor endotracheal if appropriate and nasal secretions for changes in amount and color  - [place] appropriate cooling/warming therapies per order  - Administer medications as ordered  - Instruct and encourage patient and family to use good hand hygiene technique  - Identify and instruct in appropriate isolation  precautions for identified infection/condition  Outcome: Adequate for Discharge     Problem: SAFETY ADULT  Goal: Maintain or return to baseline ADL function  Description: INTERVENTIONS:  -  Assess patient's ability to carry out ADLs; assess patient's baseline for ADL function and identify physical deficits which impact ability to perform ADLs (bathing, care of mouth/teeth, toileting, grooming, dressing, etc.)  - Assess/evaluate cause of self-care deficits   - Assess range of motion  - Assess patient's mobility; develop plan if impaired  - Assess patient's need for assistive devices and provide as appropriate  - Encourage maximum independence but intervene and supervise when necessary  - Involve family in performance of ADLs  - Assess for home care needs following discharge   - Consider OT consult to assist with ADL evaluation and planning for discharge  - Provide patient education as appropriate  Outcome: Adequate for Discharge     Problem: SAFETY ADULT  Goal: Patient will remain free of falls  Description: INTERVENTIONS:  - Educate patient/family on patient safety including physical limitations  - Instruct patient to call for assistance with activity   - Consult OT/PT to assist with strengthening/mobility   - Keep Call bell within reach  - Keep bed low and locked with side rails adjusted as appropriate  - Keep care items and personal belongings within reach  - Initiate and maintain comfort rounds  - Make Fall Risk Sign visible to staff  - Offer Toileting every 2 Hours, in advance of need  - Initiate/Maintain bed alarm  - Obtain necessary fall risk management equipment: offloading pillows  - Apply yellow socks and bracelet for high fall risk patients  - Consider moving patient to room near nurses station  Outcome: Adequate for Discharge     Problem: DISCHARGE PLANNING  Goal: Discharge to home or other facility with appropriate resources  Description: INTERVENTIONS:  - Identify barriers to discharge w/patient and  caregiver  - Arrange for needed discharge resources and transportation as appropriate  - Identify discharge learning needs (meds, wound care, etc.)  - Arrange for interpretive services to assist at discharge as needed  - Refer to Case Management Department for coordinating discharge planning if the patient needs post-hospital services based on physician/advanced practitioner order or complex needs related to functional status, cognitive ability, or social support system  Outcome: Adequate for Discharge     Problem: Knowledge Deficit  Goal: Patient/family/caregiver demonstrates understanding of disease process, treatment plan, medications, and discharge instructions  Description: Complete learning assessment and assess knowledge base.  Interventions:  - Provide teaching at level of understanding  - Provide teaching via preferred learning methods  Outcome: Adequate for Discharge

## 2024-11-04 NOTE — ASSESSMENT & PLAN NOTE
Patient met sepsis criteria on 10/21 with fever, tachycardia, leukocytosis suspected source of UTI and pneumonia. Treated with ceftriaxone. However, patient continued to have recurrent fevers, leukocytosis, despite antibiotic management. Completed 7-day course of ceftriaxone on 10/28.  Further work-up of fever as below:  CT CAP - Multilobar pneumonia. No acute findings in the abdomen or pelvis. Prominent rectal vault stool without bowel obstruction.  CT temporal bones - no evidence of mastoiditis, sinusitis  10/28 CXR showed improved bilateral perihilar consolidations suggesting improved pneumonia  RP2 negative  Repeat UA negative  Legionella urine antigen negative  UE and LE Dopplers show no evidence of acute/chronic DVT  CT CAP showed decreased left greater than right bibasilar opacities, likely representing combination of atelectasis and pneumonia. Thickening on left proximal posterior thigh muscle with ill-defined linear hyperenhancement likely infectious vs inflammatory. Small nodular rim-enhancing focus in lower uterus/cervix possible small fibroid  Procalcitonin improved  10/29 blood cx pending  Pelvic US showed no sonographic findings to correlate CT finding in the lower uterus. Diffuse endometrial thickening and heterogeneity.  Reached out to heme/onc Dr. Judge via [employer] chat. After their review, less suspicious that etiology of her persistent fevers are from occult malignancy/tumor. Her initial hypercalcemia improved. Workup for myeloma was unremarkable. Recent CT CAP showed no evidence of lymphadenopathy or occult malignancy.  Reached out to Gyn  via [employer] chat. After their review, low suspicion about the nodular focus in the TU/cervix since it wasn't visualized on US which is the better imaging nodality for gyn. Regarding the thickened endometrium, she had prior workup for this a year ago which was benign. EMS is down to 5 mm from 9 mm prior. Given patient is not actively bleeding,  no further work-up would be needed. Unlikely that gyn findings are the etiology of her fevers.   CT of left lower extremity showed intramuscular hematoma within the left gluteus young muscle.    Fever etiology likely secondary to hematoma.  She has remained afebrile since 10/31/24 at 1514. Leukocytosis resolved. Hb stable.    Plan:  She is medically cleared for discharge back to facility  Recommend frequent turning to avoid skin breakdown and pressure ulcers on her buttock/ischial regions

## 2024-11-05 ENCOUNTER — TELEPHONE (OUTPATIENT)
Dept: FAMILY MEDICINE CLINIC | Facility: CLINIC | Age: 64
End: 2024-11-05

## 2024-11-05 ENCOUNTER — TRANSITIONAL CARE MANAGEMENT (OUTPATIENT)
Dept: FAMILY MEDICINE CLINIC | Facility: CLINIC | Age: 64
End: 2024-11-05

## 2024-11-05 DIAGNOSIS — E83.52 HYPERCALCEMIA: Primary | ICD-10-CM

## 2024-11-05 NOTE — TELEPHONE ENCOUNTER
Patient d/c from hospital, scheduled TCM on 11/13/24 while you are precepting. On hospital d/c it was documented to have labs within one week for PCP, They are Serum calcium level, Vitamin D level, BMP, and CBS. Are you able to put orders to that Lisa from [facility] can arrange to have them done prior to her visit.

## 2024-11-05 NOTE — UTILIZATION REVIEW
NOTIFICATION OF ADMISSION DISCHARGE   This is a Notification of Discharge from [facility]. Please be advised that this patient has been discharge from our facility. Below you will find the admission and discharge date and time including the patient’s disposition.   UTILIZATION REVIEW CONTACT:  Shyla Garcia  Utilization   Network Utilization Review Department  Phone: [phone] x carefully listen to the prompts. All voicemails are confidential.  Email: [email or web address]     ADMISSION INFORMATION  PRESENTATION DATE: 10/18/2024  6:34 PM  OBERVATION ADMISSION DATE: N/A  INPATIENT ADMISSION DATE: 10/18/24  9:36 PM   DISCHARGE DATE: 11/4/2024  4:36 PM   DISPOSITION:Home/Self Care    Network Utilization Review Department  ATTENTION: Please call with any questions or concerns to [phone] and carefully listen to the prompts so that you are directed to the right person. All voicemails are confidential.   For Discharge needs, contact Care Management DC Support Team at [phone] opt. 2  Send all requests for admission clinical reviews, approved or denied determinations and any other requests to dedicated fax number below belonging to the campus where the patient is receiving treatment. List of dedicated fax numbers for the Facilities:  FACILITY NAME UR FAX NUMBER   ADMISSION DENIALS (Administrative/Medical Necessity) [phone]   DISCHARGE SUPPORT TEAM ([facility]) [phone]   PARENT CHILD HEALTH (Maternity/NICU/Pediatrics) [phone]   [facility] [phone]   [facility] [phone]   [facility] [phone]   [facility] [phone]   [facility] [phone]   [facility] [phone]   [facility] [phone]   [facility] [phone]    [facility] [phone]   [facility] [phone]   [facility] [phone]   [facility] [phone]

## 2024-11-11 ENCOUNTER — APPOINTMENT (OUTPATIENT)
Dept: LAB | Facility: CLINIC | Age: 64
End: 2024-11-11
Payer: MEDICARE

## 2024-11-11 DIAGNOSIS — E83.52 HYPERCALCEMIA: ICD-10-CM

## 2024-11-11 LAB
25(OH)D3 SERPL-MCNC: 68.2 NG/ML (ref 30–100)
ALBUMIN SERPL BCG-MCNC: 3.8 G/DL (ref 3.5–5)
ALP SERPL-CCNC: 48 U/L (ref 34–104)
ALT SERPL W P-5'-P-CCNC: 13 U/L (ref 7–52)
ANION GAP SERPL CALCULATED.3IONS-SCNC: 7 MMOL/L (ref 4–13)
AST SERPL W P-5'-P-CCNC: 4 U/L (ref 13–39)
BASOPHILS # BLD AUTO: 0.05 THOUSANDS/ÂΜL (ref 0–0.1)
BASOPHILS NFR BLD AUTO: 1 % (ref 0–1)
BILIRUB SERPL-MCNC: 0.28 MG/DL (ref 0.2–1)
BUN SERPL-MCNC: 34 MG/DL (ref 5–25)
CALCIUM SERPL-MCNC: 10.4 MG/DL (ref 8.4–10.2)
CHLORIDE SERPL-SCNC: 98 MMOL/L (ref 96–108)
CO2 SERPL-SCNC: 36 MMOL/L (ref 21–32)
CREAT SERPL-MCNC: 1.14 MG/DL (ref 0.6–1.3)
EOSINOPHIL # BLD AUTO: 0.11 THOUSAND/ÂΜL (ref 0–0.61)
EOSINOPHIL NFR BLD AUTO: 2 % (ref 0–6)
ERYTHROCYTE [DISTWIDTH] IN BLOOD BY AUTOMATED COUNT: 14.6 % (ref 11.6–15.1)
GFR SERPL CREATININE-BSD FRML MDRD: 51 ML/MIN/1.73SQ M
GLUCOSE P FAST SERPL-MCNC: 163 MG/DL (ref 65–99)
HCT VFR BLD AUTO: 29.5 % (ref 34.8–46.1)
HGB BLD-MCNC: 9.2 G/DL (ref 11.5–15.4)
IMM GRANULOCYTES # BLD AUTO: 0.01 THOUSAND/UL (ref 0–0.2)
IMM GRANULOCYTES NFR BLD AUTO: 0 % (ref 0–2)
LYMPHOCYTES # BLD AUTO: 1.7 THOUSANDS/ÂΜL (ref 0.6–4.47)
LYMPHOCYTES NFR BLD AUTO: 36 % (ref 14–44)
MCH RBC QN AUTO: 33.5 PG (ref 26.8–34.3)
MCHC RBC AUTO-ENTMCNC: 31.2 G/DL (ref 31.4–37.4)
MCV RBC AUTO: 107 FL (ref 82–98)
MONOCYTES # BLD AUTO: 0.45 THOUSAND/ÂΜL (ref 0.17–1.22)
MONOCYTES NFR BLD AUTO: 10 % (ref 4–12)
NEUTROPHILS # BLD AUTO: 2.42 THOUSANDS/ÂΜL (ref 1.85–7.62)
NEUTS SEG NFR BLD AUTO: 51 % (ref 43–75)
NRBC BLD AUTO-RTO: 0 /100 WBCS
PLATELET # BLD AUTO: 245 THOUSANDS/UL (ref 149–390)
PMV BLD AUTO: 12.9 FL (ref 8.9–12.7)
POTASSIUM SERPL-SCNC: 4.2 MMOL/L (ref 3.5–5.3)
PROT SERPL-MCNC: 7 G/DL (ref 6.4–8.4)
RBC # BLD AUTO: 2.75 MILLION/UL (ref 3.81–5.12)
SODIUM SERPL-SCNC: 141 MMOL/L (ref 135–147)
WBC # BLD AUTO: 4.74 THOUSAND/UL (ref 4.31–10.16)

## 2024-11-11 PROCEDURE — 36415 COLL VENOUS BLD VENIPUNCTURE: CPT

## 2024-11-11 PROCEDURE — 82306 VITAMIN D 25 HYDROXY: CPT

## 2024-11-11 PROCEDURE — 80053 COMPREHEN METABOLIC PANEL: CPT

## 2024-11-11 PROCEDURE — 85025 COMPLETE CBC W/AUTO DIFF WBC: CPT

## 2024-11-13 ENCOUNTER — OFFICE VISIT (OUTPATIENT)
Dept: FAMILY MEDICINE CLINIC | Facility: CLINIC | Age: 64
End: 2024-11-13

## 2024-11-13 VITALS
HEART RATE: 69 BPM | DIASTOLIC BLOOD PRESSURE: 61 MMHG | WEIGHT: 85.6 LBS | TEMPERATURE: 97.8 F | OXYGEN SATURATION: 97 % | SYSTOLIC BLOOD PRESSURE: 98 MMHG | BODY MASS INDEX: 17.26 KG/M2 | HEIGHT: 59 IN | RESPIRATION RATE: 18 BRPM

## 2024-11-13 DIAGNOSIS — G80.0 SPASTIC QUADRIPLEGIC CEREBRAL PALSY (HCC): ICD-10-CM

## 2024-11-13 DIAGNOSIS — D64.9 ANEMIA, UNSPECIFIED TYPE: ICD-10-CM

## 2024-11-13 DIAGNOSIS — E11.9 DIABETES MELLITUS TYPE 2, INSULIN DEPENDENT (HCC): ICD-10-CM

## 2024-11-13 DIAGNOSIS — Z79.4 DIABETES MELLITUS TYPE 2, INSULIN DEPENDENT (HCC): ICD-10-CM

## 2024-11-13 DIAGNOSIS — E83.52 HYPERCALCEMIA: Primary | ICD-10-CM

## 2024-11-13 DIAGNOSIS — E43 SEVERE PROTEIN-CALORIE MALNUTRITION (HCC): ICD-10-CM

## 2024-11-13 PROCEDURE — 99214 OFFICE O/P EST MOD 30 MIN: CPT

## 2024-11-13 PROCEDURE — G2211 COMPLEX E/M VISIT ADD ON: HCPCS

## 2024-11-13 RX ORDER — FERROUS SULFATE 300 MG/5ML
300 LIQUID (ML) ORAL EVERY OTHER DAY
Qty: 38 ML | Refills: 0 | Status: SHIPPED | OUTPATIENT
Start: 2024-11-13

## 2024-11-13 NOTE — PROGRESS NOTES
Ambulatory Visit  Name: Terrie Alicea      : 1960      MRN: [id number]  Encounter Provider: Nora Brown MD  Encounter Date: 2024   Encounter department: [facility] [place]    Assessment & Plan  Hypercalcemia  Recent hospitalization for hypercalcemia to 14.2. CMP 1 week post-hospitalization to 10.4.    Orders:    Basic metabolic panel; Future    Ambulatory Referral to Nephrology; Future    Severe protein-calorie malnutrition (HCC)  Malnutrition Findings:    Muscle atrophy    BMI Findings:  Body mass index is 17.29 kg/m².       Orders:    Ferrous Sulfate 300 (60 Fe) mg/5 mL solution; 5 mL (300 mg total) by Per G Tube route every other day    Diabetes mellitus type 2, insulin dependent (HCC)  Patient with hypoglycemia to 74. Caretaker requested glucose administration parameters for readings between 61-80 to avoid hospitalization.   Lab Results   Component Value Date    HGBA1C 6.2 (H) 2024       Orders:    Dextrose, Diabetic Use, 15 GM/33GM GEL; Take 1 Dose by mouth daily as needed (Please give as needed for blood sugars between 61 and 80 and recheck blood sugar in 20 minutes)    Spastic quadriplegic cerebral palsy (HCC)  Caretaker would like to continue pleasure feeds, however patient has a history of aspiration and was hospitalized from Aug. to Sept. for aspiration pneumonia.     Plan:  Re-evaluate with barium swallow  Orders:    FL barium swallow video w speech; Future    Anemia, unspecified type  Most recent hg of 9.2 with elevated   Orders:    CBC and differential; Future    Vitamin B12; Future       History of Present Illness     The patient is a(n) 64 y.o. female with PMH of cerebral palsy nonverbal, intellectual disability, DM II, hypothyroidism, who presents to the clinic for transition of care. She is accompanied by her caretaker. Patient was hospitalized from 10/18- for hypercalcemia and SCAR found on routine labs. Repeat labs one week later  "show resolution of SCAR, however calcium has slightly increased to 10.4. Caretaker has been keeping logs of fluid administration as there were concerns of dehydration as the cause of her hypercalcemia and SCAR, however caretaker does not believe dehydration was the contributing factor. She also has concerns of low blood glucose to 74 after receiving 1U of lispro for sugar reading of 200. Patient was asymptomatic at that time.           Review of Systems        Objective     BP 98/61 (BP Location: Left arm, Patient Position: Sitting, Cuff Size: Child)   Pulse 69   Temp 97.8 °F (36.6 °C) (Temporal)   Resp 18   Ht 4' 11\" (1.499 m)   Wt 38.8 kg (85 lb 9.6 oz)   LMP 11/29/2009 (Exact Date)   SpO2 97%   BMI 17.29 kg/m²     Physical Exam    "

## 2024-11-13 NOTE — ASSESSMENT & PLAN NOTE
Malnutrition Findings:    Muscle atrophy    BMI Findings:  Body mass index is 17.29 kg/m².       Orders:    Ferrous Sulfate 300 (60 Fe) mg/5 mL solution; 5 mL (300 mg total) by Per G Tube route every other day

## 2024-11-13 NOTE — ASSESSMENT & PLAN NOTE
Patient with hypoglycemia to 74. Caretaker requested glucose administration parameters for readings between 61-80 to avoid hospitalization.   Lab Results   Component Value Date    HGBA1C 6.2 (H) 08/13/2024       Orders:    Dextrose, Diabetic Use, 15 GM/33GM GEL; Take 1 Dose by mouth daily as needed (Please give as needed for blood sugars between 61 and 80 and recheck blood sugar in 20 minutes)

## 2024-11-13 NOTE — ASSESSMENT & PLAN NOTE
Caretaker would like to continue pleasure feeds, however patient has a history of aspiration and was hospitalized from Aug. to Sept. for aspiration pneumonia.     Plan:  Re-evaluate with barium swallow  Orders:    FL barium swallow video w speech; Future

## 2024-11-13 NOTE — ASSESSMENT & PLAN NOTE
Most recent hg of 9.2 with elevated   Orders:    CBC and differential; Future    Vitamin B12; Future

## 2024-11-14 ENCOUNTER — APPOINTMENT (OUTPATIENT)
Dept: LAB | Facility: CLINIC | Age: 64
End: 2024-11-14
Payer: MEDICARE

## 2024-11-14 DIAGNOSIS — E83.52 HYPERCALCEMIA: ICD-10-CM

## 2024-11-14 DIAGNOSIS — D64.9 ANEMIA, UNSPECIFIED TYPE: ICD-10-CM

## 2024-11-14 LAB
ANION GAP SERPL CALCULATED.3IONS-SCNC: 8 MMOL/L (ref 4–13)
BASOPHILS # BLD AUTO: 0.06 THOUSANDS/ÂΜL (ref 0–0.1)
BASOPHILS NFR BLD AUTO: 1 % (ref 0–1)
BUN SERPL-MCNC: 36 MG/DL (ref 5–25)
CALCIUM SERPL-MCNC: 10 MG/DL (ref 8.4–10.2)
CHLORIDE SERPL-SCNC: 98 MMOL/L (ref 96–108)
CO2 SERPL-SCNC: 36 MMOL/L (ref 21–32)
CREAT SERPL-MCNC: 1.05 MG/DL (ref 0.6–1.3)
EOSINOPHIL # BLD AUTO: 0.14 THOUSAND/ÂΜL (ref 0–0.61)
EOSINOPHIL NFR BLD AUTO: 2 % (ref 0–6)
ERYTHROCYTE [DISTWIDTH] IN BLOOD BY AUTOMATED COUNT: 14.5 % (ref 11.6–15.1)
GFR SERPL CREATININE-BSD FRML MDRD: 57 ML/MIN/1.73SQ M
GLUCOSE P FAST SERPL-MCNC: 196 MG/DL (ref 65–99)
HCT VFR BLD AUTO: 31.1 % (ref 34.8–46.1)
HGB BLD-MCNC: 9.6 G/DL (ref 11.5–15.4)
IMM GRANULOCYTES # BLD AUTO: 0.03 THOUSAND/UL (ref 0–0.2)
IMM GRANULOCYTES NFR BLD AUTO: 1 % (ref 0–2)
LYMPHOCYTES # BLD AUTO: 1.9 THOUSANDS/ÂΜL (ref 0.6–4.47)
LYMPHOCYTES NFR BLD AUTO: 31 % (ref 14–44)
MCH RBC QN AUTO: 33.6 PG (ref 26.8–34.3)
MCHC RBC AUTO-ENTMCNC: 30.9 G/DL (ref 31.4–37.4)
MCV RBC AUTO: 109 FL (ref 82–98)
MONOCYTES # BLD AUTO: 0.45 THOUSAND/ÂΜL (ref 0.17–1.22)
MONOCYTES NFR BLD AUTO: 7 % (ref 4–12)
NEUTROPHILS # BLD AUTO: 3.58 THOUSANDS/ÂΜL (ref 1.85–7.62)
NEUTS SEG NFR BLD AUTO: 58 % (ref 43–75)
NRBC BLD AUTO-RTO: 0 /100 WBCS
PLATELET # BLD AUTO: 222 THOUSANDS/UL (ref 149–390)
PMV BLD AUTO: 13.5 FL (ref 8.9–12.7)
POTASSIUM SERPL-SCNC: 4.4 MMOL/L (ref 3.5–5.3)
RBC # BLD AUTO: 2.86 MILLION/UL (ref 3.81–5.12)
SODIUM SERPL-SCNC: 142 MMOL/L (ref 135–147)
VIT B12 SERPL-MCNC: 1190 PG/ML (ref 180–914)
WBC # BLD AUTO: 6.16 THOUSAND/UL (ref 4.31–10.16)

## 2024-11-14 PROCEDURE — 82607 VITAMIN B-12: CPT

## 2024-11-14 PROCEDURE — 85025 COMPLETE CBC W/AUTO DIFF WBC: CPT

## 2024-11-14 PROCEDURE — 80048 BASIC METABOLIC PNL TOTAL CA: CPT

## 2024-11-14 PROCEDURE — 36415 COLL VENOUS BLD VENIPUNCTURE: CPT

## 2024-11-15 DIAGNOSIS — E08.10 DIABETES MELLITUS DUE TO UNDERLYING CONDITION WITH KETOACIDOSIS WITHOUT COMA, WITH LONG-TERM CURRENT USE OF INSULIN (HCC): ICD-10-CM

## 2024-11-15 DIAGNOSIS — Z79.4 DIABETES MELLITUS DUE TO UNDERLYING CONDITION WITH KETOACIDOSIS WITHOUT COMA, WITH LONG-TERM CURRENT USE OF INSULIN (HCC): ICD-10-CM

## 2024-11-15 RX ORDER — PEN NEEDLE, DIABETIC 32GX 5/32"
NEEDLE, DISPOSABLE MISCELLANEOUS
Qty: 400 EACH | Refills: 3 | Status: SHIPPED | OUTPATIENT
Start: 2024-11-15

## 2024-11-18 ENCOUNTER — OFFICE VISIT (OUTPATIENT)
Dept: NEPHROLOGY | Facility: CLINIC | Age: 64
End: 2024-11-18
Payer: MEDICARE

## 2024-11-18 ENCOUNTER — TELEPHONE (OUTPATIENT)
Dept: FAMILY MEDICINE CLINIC | Facility: CLINIC | Age: 64
End: 2024-11-18

## 2024-11-18 VITALS
HEART RATE: 65 BPM | DIASTOLIC BLOOD PRESSURE: 60 MMHG | HEIGHT: 59 IN | BODY MASS INDEX: 17.29 KG/M2 | SYSTOLIC BLOOD PRESSURE: 96 MMHG

## 2024-11-18 DIAGNOSIS — N17.9 AKI (ACUTE KIDNEY INJURY) (HCC): ICD-10-CM

## 2024-11-18 DIAGNOSIS — E83.52 HYPERCALCEMIA: Primary | ICD-10-CM

## 2024-11-18 DIAGNOSIS — Z79.4 DIABETES MELLITUS TYPE 2, INSULIN DEPENDENT (HCC): ICD-10-CM

## 2024-11-18 DIAGNOSIS — E11.9 DIABETES MELLITUS TYPE 2, INSULIN DEPENDENT (HCC): ICD-10-CM

## 2024-11-18 PROCEDURE — 99213 OFFICE O/P EST LOW 20 MIN: CPT | Performed by: STUDENT IN AN ORGANIZED HEALTH CARE EDUCATION/TRAINING PROGRAM

## 2024-11-18 NOTE — ASSESSMENT & PLAN NOTE
Lab Results   Component Value Date    HGBA1C 6.2 (H) 08/13/2024     HbA1c 6.2  Advised to maintain a good DM control to prevent  CKD   Maintain healthy diet (vegetables, fruits, whole grains, nonfat or low fat)  Weight loss  Physical activity (5 to 10 minutes to start the increase to 30 min a day)

## 2024-11-18 NOTE — PATIENT INSTRUCTIONS
Thank you for coming to your visit today. As we discussed you kidney function is back to your baseline , your creatinine is 1.05mg/dL. Your electrolytes are normal . Please follow the recommendations below       Avoid nonsteroidal anti-inflammatory drugs such as Naprosyn, ibuprofen, Aleve, Advil, Celebrex, Meloxicam (Mobic) etc.  You can use Tylenol as needed if you do not have any liver condition to be concerned about    You can continue follow up with PCP         Joselyn Reyes Bahamonde, MD  Nephrology Attending

## 2024-11-18 NOTE — PROGRESS NOTES
Name: Terrie Alicea      : 1960      MRN: [id number]  Encounter Provider: Joselyn Reyes Bahamonde, MD  Encounter Date: 2024   Encounter department: [facility] [place]  :  Assessment & Plan  Hypercalcemia  Patient was admitted in the hospital with elevated calcium in the settings of high calcium intake and supplements  Serum calcium 10 mg/dL  Status post IV hydration and calcitonin  Intake was decreased       SCAR (acute kidney injury) (HCC)  Serum creatinine 1.05 mg/dL, resolved  Etiology: Secondary to vasoconstriction in the settings of hypercalcemia and dehydration  Resolved with IV fluids and improve meant of hypercalcemia       Diabetes mellitus type 2, insulin dependent (HCC)    Lab Results   Component Value Date    HGBA1C 6.2 (H) 2024     HbA1c 6.2  Advised to maintain a good DM control to prevent  CKD   Maintain healthy diet (vegetables, fruits, whole grains, nonfat or low fat)  Weight loss  Physical activity (5 to 10 minutes to start the increase to 30 min a day)               History of Present Illness     HPI  Terrie Alicea is a 64 y.o. female PMH of diabetes, Parkinson disease, hyperlipidemia. Patient admitted with hypercalcemia and SCAR. Patient is here for follow up of hypercalcemia .   History obtained from: patient    Review of Systems unable to assess due to cerebral palsy  Pertinent Medical History   Was admitted with hypercalcemia and SCAR in the settings of high calcium intake    Medical History Reviewed by provider this encounter:     .  Current Outpatient Medications on File Prior to Visit   Medication Sig Dispense Refill    Alcohol Swabs 70 % PADS May substitute brand based on insurance coverage. Check glucose TID. 100 each 0    ARIPiprazole (ABILIFY) 2 mg tablet 1 tablet by Per G Tube route daily at bedtime      Ascorbic Acid (vitamin C) 1000 MG tablet 1 tablet (1,000 mg total) by Per G Tube route daily 90 tablet 3    baclofen 10 mg tablet 1 tablet (10  mg total) by Per G Tube route 3 (three) times a day 84 tablet 0    Blood Glucose Monitoring Suppl (OneTouch Verio Reflect) w/Device KIT May substitute brand based on insurance coverage. Check glucose TID. 1 kit 0    carbidopa-levodopa (SINEMET CR)  mg TBCR per ER tablet TAKE 2 TABLETS BY MOUTH (50/200MG) TWICE A DAY (PARKINSONS DISEASE) 112 tablet 10    citalopram (CeleXA) 20 mg tablet 1 tablet (20 mg total) by Per G Tube route daily (Patient not taking: Reported on 11/13/2024) 30 tablet 1    Dextrose, Diabetic Use, 15 GM/33GM GEL Take 1 Dose by mouth daily as needed (Please give as needed for blood sugars between 61 and 80 and recheck blood sugar in 20 minutes) 33 g 6    escitalopram (LEXAPRO) 10 mg tablet  (Patient taking differently: 10 mg by Per G Tube route daily)      Ferrous Sulfate 300 (60 Fe) mg/5 mL solution 5 mL (300 mg total) by Per G Tube route every other day 38 mL 0    Glucagon (Gvoke HypoPen 2-Pack) 0.5 MG/0.1ML SOAJ Inject 1 mg under the skin every 15 (fifteen) minutes as needed (for symptomatic blood glucose <60 and/or if patient is unconcious or seizing) Call 911 after use of pen and then notify Endocrinology provider 0.2 mL 0    glucose 40 % DISSOLVE IN WARM WATER and administer via peg tube as needed for asymptomatic for blood sugars less than 60 mg/dL and notify endocrine provider. Check blood glucose level in 15 minutes and repeat administration if blood glucose levels are less than 100. Repeat process until blood glucose level is above 100. 12.5 g 1    glucose blood (OneTouch Verio) test strip May substitute brand based on insurance coverage. Check glucose TID. 100 each 0    guaiFENesin (DIABETIC TUSSIN EX) 100 MG/5ML oral liquid 10 mL (200 mg total) by Per G Tube route 4 (four) times a day as needed for cough or congestion      Gvoke HypoPen 2-Pack 1 MG/0.2ML SOAJ  (Patient not taking: Reported on 11/13/2024)      hydrocortisone 1 % cream Apply topically to affected area twice daily as  needed for rash 30 g 0    Insulin Pen Needle (BD Pen Needle Yue 2nd Gen) 32G X 4 MM MISC Use to inject insulin 4 times daily 400 each 3    insulin regular (HumuLIN R,NovoLIN R) 100 units/mL injection Inject 4 units subcutaneously at 6 PM and midnight while tube feeds are running 3 mL 1    insulin regular (HumuLIN R,NovoLIN R) 100 units/mL injection Inject 1-5 Units under the skin every 6 (six) hours if 0 - 179 = 0; 180 - 224 = 1 unit; 225 - 299 = 2 unit; 300 - 374 = 3 unit; 375 - 449 = 4 unit; 450+ = 5 unit.  Call MD for BS less than 60 or greater than 450 15 mL 1    lansoprazole (PREVACID SOLUTAB) 15 mg disintegrating tablet 1 tablet (15 mg total) by Per G Tube route daily Please dissolve in water. 30 tablet 1    levothyroxine 25 mcg tablet 1 tablet (25 mcg total) by Per G Tube route daily TAKE 1 TABLET BY MOUTH 1 HOUR BEFORE STARTING TUBE FEEDS (HYPOTHYROIDISM)      LORazepam (Ativan) 0.5 mg tablet 1 tablet (0.5 mg total) by Per G Tube route daily at bedtime for 10 days Hold for sedation      methenamine hippurate (HIPREX) 1 g tablet Crush 1 tablet two times a day per peg tube      midodrine (PROAMATINE) 5 mg tablet 1 tablet (5 mg total) by Per G Tube route 3 (three) times a day For hypotension, hold for systolic blood pressure greater than 110. 90 tablet 1    neomycin-bacitracin-polymyxin b (NEOSPORIN) ointment Apply 1 application topically 2 (two) times a day as needed Apply to affected area BID PRN      NovoLIN R FlexPen 100 UNIT/ML SOPN  (Patient not taking: Reported on 11/13/2024)      olopatadine HCl (PATADAY) 0.2 % opth drops Administer 1 drop to both eyes as needed Instill 1 drop into affected eyes daily PRN for eye redness      OneTouch Delica Lancets 33G MISC May substitute brand based on insurance coverage. Check glucose TID. 100 each 0    oxybutynin (DITROPAN) 5 mg tablet 1 tablet (5 mg total) by Per G Tube route 3 (three) times a day      polyethylene glycol (GLYCOLAX) 17 GM/SCOOP powder 17 g by Per  G Tube route daily 510 g 0    Refresh Liquigel 1 % GEL 2 (two) times a day 1 drop Bid bilaterally      simvastatin (ZOCOR) 20 mg tablet Daily at 4:00 PM.      traZODone (DESYREL) 100 mg tablet 1 tablet (100 mg total) by Per G Tube route daily at bedtime      valproic acid (DEPAKENE) 250 MG/5ML soln 2.5 mL (125 mg total) by Per G Tube route 2 (two) times a day 473 mL 1    Vitamins A & D (VITAMIN A & D) ointment Apply topically as needed for skin irritation       No current facility-administered medications on file prior to visit.      Social History     Tobacco Use    Smoking status: Never    Smokeless tobacco: Never   Vaping Use    Vaping status: Never Used   Substance and Sexual Activity    Alcohol use: Not Currently    Drug use: No    Sexual activity: Never        Objective   LMP 11/29/2009 (Exact Date)      Physical Exam  General:  no acute distress at this time  Skin:  No acute rash  Eyes:  No scleral icterus and noninjected  ENT:  mucous membranes moist  Neck:  no carotid bruits  Chest:  Clear to auscultation percussion, good respiratory effort, no use of accessory respiratory muscles  CVS:  Regular rate and rhythm without rub   Abdomen:  soft and nontender   Extremities: no significant lower extremity edema  Neuro: Cerebral palsy  Psych: Awake, interactive

## 2024-11-20 ENCOUNTER — OFFICE VISIT (OUTPATIENT)
Dept: PODIATRY | Facility: CLINIC | Age: 64
End: 2024-11-20
Payer: MEDICARE

## 2024-11-20 VITALS — HEIGHT: 59 IN | WEIGHT: 85 LBS | BODY MASS INDEX: 17.14 KG/M2

## 2024-11-20 DIAGNOSIS — B35.1 ONYCHOMYCOSIS: ICD-10-CM

## 2024-11-20 DIAGNOSIS — G63 POLYNEUROPATHY ASSOCIATED WITH UNDERLYING DISEASE (HCC): Primary | ICD-10-CM

## 2024-11-20 DIAGNOSIS — G20.A1 PARKINSON'S DISEASE, UNSPECIFIED WHETHER DYSKINESIA PRESENT, UNSPECIFIED WHETHER MANIFESTATIONS FLUCTUATE (HCC): ICD-10-CM

## 2024-11-20 PROCEDURE — 11720 DEBRIDE NAIL 1-5: CPT | Performed by: PODIATRIST

## 2024-11-20 PROCEDURE — RECHECK: Performed by: PODIATRIST

## 2024-11-20 NOTE — PROGRESS NOTES
Name: Terrie Alicea      : 1960      MRN: [id number]  Encounter Provider: Manolo Eubanks DPM  Encounter Date: 2024   Encounter department: [facility] [place]  :  Assessment & Plan  Polyneuropathy associated with underlying disease (HCC)         Onychomycosis       Debride mycotic nails and thin the nail plates x 2 with the use of a nail nipper manually and an electric Dremel bur was used to reduce the thickness of the nail beds and smoothed the distal aspect of the nails.   Parkinson's disease, unspecified whether dyskinesia present, unspecified whether manifestations fluctuate (HCC)         Discussed proper shoe gear, daily inspections of feet, and general foot health with patient. Patient has Q9  findings and is recommended for at risk foot care every 9-10 weeks.     Return in about 10 weeks (around 2025).     History of Present Illness     HPI  Terrie Alicea is a 64 y.o. female who presents chief complaint of painful thick nails on both feet.  Her staff was with her today as she sat in her wheelchair.Patient presents for at-risk foot care.  Patient has no acute concerns today.  Patient has significant lower extremity risk due to neuropathy, parasthesia, edema, and trophic skin changes to the lower extremity.   History obtained from: patient's Legal Guardian    Review of Systems  Medical History Reviewed by provider this encounter:     .  Current Outpatient Medications on File Prior to Visit   Medication Sig Dispense Refill    Alcohol Swabs 70 % PADS May substitute brand based on insurance coverage. Check glucose TID. 100 each 0    ARIPiprazole (ABILIFY) 2 mg tablet 1 tablet by Per G Tube route daily at bedtime      Ascorbic Acid (vitamin C) 1000 MG tablet 1 tablet (1,000 mg total) by Per G Tube route daily 90 tablet 3    baclofen 10 mg tablet 1 tablet (10 mg total) by Per G Tube route 3 (three) times a day 84 tablet 0    Blood Glucose Monitoring Suppl (OneTouch Verio  Reflect) w/Device KIT May substitute brand based on insurance coverage. Check glucose TID. 1 kit 0    carbidopa-levodopa (SINEMET CR)  mg TBCR per ER tablet TAKE 2 TABLETS BY MOUTH (50/200MG) TWICE A DAY (PARKINSONS DISEASE) 112 tablet 10    Dextrose, Diabetic Use, 15 GM/33GM GEL Take 1 Dose by mouth daily as needed (Please give as needed for blood sugars between 61 and 80 and recheck blood sugar in 20 minutes) 33 g 6    escitalopram (LEXAPRO) 10 mg tablet       Ferrous Sulfate 300 (60 Fe) mg/5 mL solution 5 mL (300 mg total) by Per G Tube route every other day 38 mL 0    Glucagon (Gvoke HypoPen 2-Pack) 0.5 MG/0.1ML SOAJ Inject 1 mg under the skin every 15 (fifteen) minutes as needed (for symptomatic blood glucose <60 and/or if patient is unconcious or seizing) Call 911 after use of pen and then notify Endocrinology provider 0.2 mL 0    glucose 40 % DISSOLVE IN WARM WATER and administer via peg tube as needed for asymptomatic for blood sugars less than 60 mg/dL and notify endocrine provider. Check blood glucose level in 15 minutes and repeat administration if blood glucose levels are less than 100. Repeat process until blood glucose level is above 100. 12.5 g 1    glucose blood (OneTouch Verio) test strip May substitute brand based on insurance coverage. Check glucose TID. 100 each 0    guaiFENesin (DIABETIC TUSSIN EX) 100 MG/5ML oral liquid 10 mL (200 mg total) by Per G Tube route 4 (four) times a day as needed for cough or congestion      hydrocortisone 1 % cream Apply topically to affected area twice daily as needed for rash 30 g 0    Insulin Pen Needle (BD Pen Needle Yue 2nd Gen) 32G X 4 MM MISC Use to inject insulin 4 times daily 400 each 3    insulin regular (HumuLIN R,NovoLIN R) 100 units/mL injection Inject 4 units subcutaneously at 6 PM and midnight while tube feeds are running 3 mL 1    insulin regular (HumuLIN R,NovoLIN R) 100 units/mL injection Inject 1-5 Units under the skin every 6 (six) hours  if 0 - 179 = 0; 180 - 224 = 1 unit; 225 - 299 = 2 unit; 300 - 374 = 3 unit; 375 - 449 = 4 unit; 450+ = 5 unit.  Call MD for BS less than 60 or greater than 450 15 mL 1    lansoprazole (PREVACID SOLUTAB) 15 mg disintegrating tablet 1 tablet (15 mg total) by Per G Tube route daily Please dissolve in water. 30 tablet 1    levothyroxine 25 mcg tablet 1 tablet (25 mcg total) by Per G Tube route daily TAKE 1 TABLET BY MOUTH 1 HOUR BEFORE STARTING TUBE FEEDS (HYPOTHYROIDISM)      methenamine hippurate (HIPREX) 1 g tablet Crush 1 tablet two times a day per peg tube      midodrine (PROAMATINE) 5 mg tablet 1 tablet (5 mg total) by Per G Tube route 3 (three) times a day For hypotension, hold for systolic blood pressure greater than 110. 90 tablet 1    neomycin-bacitracin-polymyxin b (NEOSPORIN) ointment Apply 1 application topically 2 (two) times a day as needed Apply to affected area BID PRN      olopatadine HCl (PATADAY) 0.2 % opth drops Administer 1 drop to both eyes as needed Instill 1 drop into affected eyes daily PRN for eye redness      OneTouch Delica Lancets 33G MISC May substitute brand based on insurance coverage. Check glucose TID. 100 each 0    oxybutynin (DITROPAN) 5 mg tablet 1 tablet (5 mg total) by Per G Tube route 3 (three) times a day      polyethylene glycol (GLYCOLAX) 17 GM/SCOOP powder 17 g by Per G Tube route daily 510 g 0    Refresh Liquigel 1 % GEL 2 (two) times a day 1 drop Bid bilaterally      simvastatin (ZOCOR) 20 mg tablet Daily at 4:00 PM.      traZODone (DESYREL) 100 mg tablet 1 tablet (100 mg total) by Per G Tube route daily at bedtime      valproic acid (DEPAKENE) 250 MG/5ML soln 2.5 mL (125 mg total) by Per G Tube route 2 (two) times a day 473 mL 1    Vitamins A & D (VITAMIN A & D) ointment Apply topically as needed for skin irritation      citalopram (CeleXA) 20 mg tablet 1 tablet (20 mg total) by Per G Tube route daily (Patient not taking: Reported on 11/19/2024) 30 tablet 1    Gvoke  "HypoPen 2-Pack 1 MG/0.2ML SOAJ  (Patient not taking: Reported on 11/19/2024)      LORazepam (Ativan) 0.5 mg tablet 1 tablet (0.5 mg total) by Per G Tube route daily at bedtime for 10 days Hold for sedation      NovoLIN R FlexPen 100 UNIT/ML SOPN  (Patient not taking: Reported on 11/19/2024)       No current facility-administered medications on file prior to visit.      Social History     Tobacco Use    Smoking status: Never    Smokeless tobacco: Never   Vaping Use    Vaping status: Never Used   Substance and Sexual Activity    Alcohol use: Not Currently    Drug use: No    Sexual activity: Never        Objective   Ht 4' 11\" (1.499 m) Comment: verbal  Wt 38.6 kg (85 lb) Comment: verbal  LMP 11/29/2009 (Exact Date)   BMI 17.17 kg/m²      Physical Exam  Vascular status is a faint 1/4 DP PT negative digital hair normal distal cooling immediate capillary refill bilaterally.  Capillary refill is approximately 1 to 2 seconds.    Derm nails are brittle elongated hypertrophic white discoloration with subungual debris x 2.  There is an increased thickness and the nails are approximately 1 mm.  There is slight loss of turgor noted bilaterally.    Neuro light touch is intact and vibration is slightly diminished bilaterally.    Administrative Statements   I have spent a total time of 14 minutes in caring for this patient on the day of the visit/encounter including Risks and benefits of tx options, Instructions for management, Patient and family education, Importance of tx compliance, Counseling / Coordination of care, and Documenting in the medical record.   "

## 2024-11-22 ENCOUNTER — APPOINTMENT (EMERGENCY)
Dept: RADIOLOGY | Facility: HOSPITAL | Age: 64
End: 2024-11-22
Payer: MEDICARE

## 2024-11-22 ENCOUNTER — HOSPITAL ENCOUNTER (EMERGENCY)
Facility: HOSPITAL | Age: 64
Discharge: HOME | End: 2024-11-22
Attending: EMERGENCY MEDICINE
Payer: MEDICARE

## 2024-11-22 VITALS
TEMPERATURE: 100 F | DIASTOLIC BLOOD PRESSURE: 72 MMHG | SYSTOLIC BLOOD PRESSURE: 127 MMHG | OXYGEN SATURATION: 97 % | RESPIRATION RATE: 16 BRPM | HEART RATE: 73 BPM

## 2024-11-22 DIAGNOSIS — R53.83 LETHARGY: Primary | ICD-10-CM

## 2024-11-22 LAB
ALBUMIN SERPL BCG-MCNC: 3.8 G/DL (ref 3.5–5)
ALP SERPL-CCNC: 46 U/L (ref 34–104)
ALT SERPL W P-5'-P-CCNC: 10 U/L (ref 7–52)
ANION GAP SERPL CALCULATED.3IONS-SCNC: 8 MMOL/L (ref 4–13)
AST SERPL W P-5'-P-CCNC: 11 U/L (ref 13–39)
BASOPHILS # BLD AUTO: 0.03 THOUSANDS/ÂΜL (ref 0–0.1)
BASOPHILS NFR BLD AUTO: 0 % (ref 0–1)
BILIRUB SERPL-MCNC: 0.29 MG/DL (ref 0.2–1)
BUN SERPL-MCNC: 41 MG/DL (ref 5–25)
CALCIUM SERPL-MCNC: 9.4 MG/DL (ref 8.4–10.2)
CHLORIDE SERPL-SCNC: 99 MMOL/L (ref 96–108)
CO2 SERPL-SCNC: 31 MMOL/L (ref 21–32)
CREAT SERPL-MCNC: 1 MG/DL (ref 0.6–1.3)
EOSINOPHIL # BLD AUTO: 0.02 THOUSAND/ÂΜL (ref 0–0.61)
EOSINOPHIL NFR BLD AUTO: 0 % (ref 0–6)
ERYTHROCYTE [DISTWIDTH] IN BLOOD BY AUTOMATED COUNT: 13.8 % (ref 11.6–15.1)
GFR SERPL CREATININE-BSD FRML MDRD: 60 ML/MIN/1.73SQ M
GLUCOSE SERPL-MCNC: 164 MG/DL (ref 65–140)
GLUCOSE SERPL-MCNC: 224 MG/DL (ref 65–140)
HCT VFR BLD AUTO: 32.9 % (ref 34.8–46.1)
HGB BLD-MCNC: 10.5 G/DL (ref 11.5–15.4)
IMM GRANULOCYTES # BLD AUTO: 0.01 THOUSAND/UL (ref 0–0.2)
IMM GRANULOCYTES NFR BLD AUTO: 0 % (ref 0–2)
LYMPHOCYTES # BLD AUTO: 0.56 THOUSANDS/ÂΜL (ref 0.6–4.47)
LYMPHOCYTES NFR BLD AUTO: 7 % (ref 14–44)
MCH RBC QN AUTO: 33.9 PG (ref 26.8–34.3)
MCHC RBC AUTO-ENTMCNC: 31.9 G/DL (ref 31.4–37.4)
MCV RBC AUTO: 106 FL (ref 82–98)
MONOCYTES # BLD AUTO: 0.69 THOUSAND/ÂΜL (ref 0.17–1.22)
MONOCYTES NFR BLD AUTO: 8 % (ref 4–12)
NEUTROPHILS # BLD AUTO: 7.08 THOUSANDS/ÂΜL (ref 1.85–7.62)
NEUTS SEG NFR BLD AUTO: 85 % (ref 43–75)
NRBC BLD AUTO-RTO: 0 /100 WBCS
PLATELET # BLD AUTO: 142 THOUSANDS/UL (ref 149–390)
PMV BLD AUTO: 13.1 FL (ref 8.9–12.7)
POTASSIUM SERPL-SCNC: 4.3 MMOL/L (ref 3.5–5.3)
PROT SERPL-MCNC: 6.9 G/DL (ref 6.4–8.4)
RBC # BLD AUTO: 3.1 MILLION/UL (ref 3.81–5.12)
SODIUM SERPL-SCNC: 138 MMOL/L (ref 135–147)
WBC # BLD AUTO: 8.39 THOUSAND/UL (ref 4.31–10.16)

## 2024-11-22 PROCEDURE — 36415 COLL VENOUS BLD VENIPUNCTURE: CPT

## 2024-11-22 PROCEDURE — 99284 EMERGENCY DEPT VISIT MOD MDM: CPT | Performed by: EMERGENCY MEDICINE

## 2024-11-22 PROCEDURE — 71045 X-RAY EXAM CHEST 1 VIEW: CPT

## 2024-11-22 PROCEDURE — 99285 EMERGENCY DEPT VISIT HI MDM: CPT

## 2024-11-22 PROCEDURE — 80053 COMPREHEN METABOLIC PANEL: CPT

## 2024-11-22 PROCEDURE — 82948 REAGENT STRIP/BLOOD GLUCOSE: CPT

## 2024-11-22 PROCEDURE — 85025 COMPLETE CBC W/AUTO DIFF WBC: CPT

## 2024-11-22 NOTE — DISCHARGE INSTRUCTIONS
Please return to the emergency department if Terrie develops worsening respiratory distress, shortness of breath, increased lethargy, fever, chills, or overt wincing in pain. Her vitals were very reassuring today. Her SpO2 was 97-98% and she was in no distress. Her chest-xray did not show any obvious infiltrates or concerns for pneumonia. She is medically stable for discharge back to her facility. Please treat any fever 100.4 F or above with an anti-pyretic. She had a temperature of 100 F rectally but no changes in her vitals today.

## 2024-11-22 NOTE — ED PROVIDER NOTES
ED Disposition       None          Assessment & Plan       Medical Decision Making  65 yo female with PMH nonverbal cerebral palsy, intellectual disability, type 2 diabetes with recent hospital admissions presenting from her facility because her morning nurse noticed a low SpO2 of 87% and associated lethargy in addition to observed gurgling noise during med pass this morning. There was a concern for aspiration event. The charge nurse at the facility who is patient's main caretaker is present at bedside to provide history given that the patient is nonverbal.  She notes that overall the patient has been doing well since being discharged.  She is tolerating her nightly tube feeds and fluid intake with no changes in bowel habits.  She appeared back to to her baseline with regards to alertness while in the emergency department, arousing with verbal and tactile stimuli. Her SpO2 increased and stayed around 97-98% and she was in no respiratory distress.  She was afebrile and chest x-ray obtained did not show any focal infiltrates.  Her labs including CBC and CMP were largely unremarkable and not suggestive of any infection or SCAR.  Differential diagnosis includes aspiration pneumonitis, aspiration pneumonia, reflux. She is safe for medical discharge back to her facility with strict return to ED precautions discussed with her caretaker at bedside.     Amount and/or Complexity of Data Reviewed  Labs: ordered.  Radiology: ordered and independent interpretation performed.             Medications - No data to display    ED Risk Strat Scores                                               History of Present Illness       Chief Complaint   Patient presents with    Medical Problem     Pt from group home via ems; staff worried about aspiration. Staff also reports elevated blood sugar, weakness and low grade temp of 99       Past Medical History:   Diagnosis Date    Acute metabolic encephalopathy 12/11/2015    Adjustment disorder      SCAR (acute kidney injury) ([facility]) 05/10/2024    Altered mental status 12/11/2015    Anemia     Bipolar 1 disorder ([facility])     Cerebral palsy ([facility])     Chronic hypernatremia 02/06/2016    Closed fracture of left hip ([facility]) 01/19/2016    Closed left hip fracture ([facility])     no surgery    Constipation     Dehydration 02/20/2016    Developmental delay     Diabetes mellitus ([facility])     Difficulty walking     Disease of thyroid gland     Diverticulosis     Dysphagia     Worsening    Fracture of multiple ribs of right side     Herpes zoster without complication 06/02/2021    Hip fracture ([facility]) 07/26/2015    left    Hypercalcemia 08/19/2021    Hyperlipidemia     Hypernatremia 12/28/2018    Hypotension     Impulse control disorder     Incontinence     Intellectual disability due to developmental disorder, unspecified     Microalbuminuria     Neuropathy in diabetes ([facility])     Osteopathia     Osteoporosis     Peripheral neuropathy     Pneumonia 12/28/2018    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[facility])     Thrombocytopenia ([facility]) 07/31/2015      Past Surgical History:   Procedure Laterality Date    ABDOMINAL SURGERY      COLECTOMY MIN      re-anastomosis 7/22/16    COLON SURGERY  1/31/16    COLONOSCOPY N/A 07/21/2016    Procedure: COLONOSCOPY;  Surgeon: Rosalino Jacome MD;  Location: [facility];  Service:     COLONOSCOPY N/A 01/31/2016    Procedure: COLONOSCOPY;  Surgeon: Rosalino Jacome MD;  [facility];  Service:     COLONOSCOPY N/A 07/25/2017    Procedure: COLONOSCOPY;  Surgeon: Rosalino Jacome MD;  Location: [facility];  Service: Colorectal    COLOSTOMY      EXPLORATORY LAPAROTOMY W/ BOWEL RESECTION N/A 01/31/2016    Procedure: exploratory laparotomy, left sigmoidectomy, coloproctostomy, take down splenic flexure, loop colostomy;  Surgeon: Rosalino Jacome MD;  [facility];  Service:     GASTROSTOMY TUBE PLACEMENT N/A 8/26/2024    Procedure: INSERTION GASTROSTOMY TUBE OPEN;  Surgeon: Monroe Jean DO;  [facility]: [facility];  Service: General    NY CLOSURE ENTEROSTOMY LG/SMALL INTESTINE N/A 07/22/2016    Procedure: SEGMENTAL COLECTOMY WITH COLOCOLOSTOMY;  Surgeon: Rosalino Jacome MD;  [facility];  Service: Colorectal    UPPER GASTROINTESTINAL ENDOSCOPY        Family History   Problem Relation Age of Onset    Alcohol abuse Mother     Alcohol abuse Father     Diabetes Sister     No Known Problems Sister     No Known Problems Sister       Social History     Tobacco Use    Smoking status: Never    Smokeless tobacco: Never   Vaping Use    Vaping status: Never Used   Substance Use Topics    Alcohol use: Not Currently    Drug use: No      E-Cigarette/Vaping    E-Cigarette Use Never User       E-Cigarette/Vaping Substances    Nicotine No     THC No     CBD No     Flavoring No     Other No     Unknown No       I have reviewed and agree with the history as documented.     65 yo female PMH nonverbal cerebral palsy, intellectual disability, type 2 diabetes, hypothyroidism who has had recent hospitalizations for pneumonia, SCAR, and hypercalcemia presents with her caretaker today because her morning nurse noticed abnormal vital sign SpO2 87% with some lethargy and gurgling from her mouth this morning and a temperature of 99.6 F.  Her primary caretaker and charge nurse who is at bedside notes that patient appears improved and is at her baseline level of alertness and engagement.  Her SpO2 while in the room was 97-98%.  She is still getting her tube feeds 6 PM to 6 AM without any complications.  Her daily fluid intake is greater than 2400 mL.  Her urinary output has not decreased, and has remained the same.  There are no issues with her bowel habits as well.  No vomiting episodes or grimacing while being here.  No fever, rigors, cough, respiratory distress noted by caretaker.             Medical Problem  Associated symptoms: no diarrhea, no fever and no vomiting        Review of Systems   Reason unable to perform ROS: patient  nonverbal.   Constitutional:  Negative for fever.   Gastrointestinal:  Negative for constipation, diarrhea and vomiting.   Genitourinary:  Negative for decreased urine volume.   Psychiatric/Behavioral:  Negative for agitation and behavioral problems.    All other systems reviewed and are negative.          Objective       ED Triage Vitals [11/22/24 0752]   Temperature Pulse Blood Pressure Respirations SpO2 Patient Position - Orthostatic VS   98.1 °F (36.7 °C) 84 135/59 18 97 % Sitting      Temp Source Heart Rate Source BP Location FiO2 (%) Pain Score    Rectal Monitor Right arm -- --      Vitals      Date and Time Temp Pulse SpO2 Resp BP Pain Score FACES Pain Rating User   11/22/24 0752 98.1 °F (36.7 °C) 84 97 % 18 135/59 -- -- AS            Physical Exam  Vitals reviewed.   Constitutional:       General: She is not in acute distress.     Appearance: She is not diaphoretic.   HENT:      Head: Normocephalic and atraumatic.      Nose: Nose normal.      Mouth/Throat:      Mouth: Mucous membranes are moist.   Eyes:      Comments: Tracking the room, following my voice   Cardiovascular:      Rate and Rhythm: Normal rate and regular rhythm.   Pulmonary:      Effort: Pulmonary effort is normal.      Breath sounds: Normal breath sounds.      Comments: Saturating well on room air, in no respiratory distress. No coughing during my encounter  Abdominal:      General: There is no distension.      Palpations: Abdomen is soft.      Tenderness: There is no abdominal tenderness. There is no guarding.      Comments: G tube noted   Musculoskeletal:      Right lower leg: No edema.      Left lower leg: No edema.   Skin:     General: Skin is warm and dry.   Neurological:      Mental Status: She is alert. Mental status is at baseline.      Comments: At baseline alertness per caretaker at bedside. Contractures. Nonverbal       Results Reviewed       Procedure Component Value Units Date/Time    CBC and differential [[id number]]     Lab  Status: No result Specimen: Blood     Comprehensive metabolic panel [[id number]]     Lab Status: No result Specimen: Blood     Fingerstick Glucose (POCT) [[id number]]  (Abnormal) Collected: 24    Lab Status: Final result Specimen: Blood Updated: 24     POC Glucose 224 mg/dl             No orders to display       Procedures    ED Medication and Procedure Management   Prior to Admission Medications   Prescriptions Last Dose Informant Patient Reported? Taking?   ARIPiprazole (ABILIFY) 2 mg tablet  Care Giver Yes No   Si tablet by Per G Tube route daily at bedtime   Alcohol Swabs 70 % PADS   No No   Sig: May substitute brand based on insurance coverage. Check glucose TID.   Ascorbic Acid (vitamin C) 1000 MG tablet   No No   Si tablet (1,000 mg total) by Per G Tube route daily   Blood Glucose Monitoring Suppl (OneTouch Verio Reflect) w/Device KIT   No No   Sig: May substitute brand based on insurance coverage. Check glucose TID.   Dextrose, Diabetic Use, 15 GM/33GM GEL   No No   Sig: Take 1 Dose by mouth daily as needed (Please give as needed for blood sugars between 61 and 80 and recheck blood sugar in 20 minutes)   Ferrous Sulfate 300 (60 Fe) mg/5 mL solution   No No   Si mL (300 mg total) by Per G Tube route every other day   Glucagon (Gvoke HypoPen 2-Pack) 0.5 MG/0.1ML SOAJ   No No   Sig: Inject 1 mg under the skin every 15 (fifteen) minutes as needed (for symptomatic blood glucose <60 and/or if patient is unconcious or seizing) Call 911 after use of pen and then notify Endocrinology provider   Gvoke HypoPen 2-Pack 1 MG/0.2ML SOAJ   Yes No   Patient not taking: Reported on 2024   Insulin Pen Needle (BD Pen Needle Yue 2nd Gen) 32G X 4 MM MISC   No No   Sig: Use to inject insulin 4 times daily   LORazepam (Ativan) 0.5 mg tablet   No No   Si tablet (0.5 mg total) by Per G Tube route daily at bedtime for 10 days Hold for sedation   NovoLIN R FlexPen 100 UNIT/ML SOPN   Yes No    Patient not taking: Reported on 2024   OneTouch Delica Lancets 33G MISC   No No   Sig: May substitute brand based on insurance coverage. Check glucose TID.   Refresh Liquigel 1 % GEL  Care Giver Yes No   Si (two) times a day 1 drop Bid bilaterally   Vitamins A & D (VITAMIN A & D) ointment  Care Giver Yes No   Sig: Apply topically as needed for skin irritation   baclofen 10 mg tablet   No No   Si tablet (10 mg total) by Per G Tube route 3 (three) times a day   carbidopa-levodopa (SINEMET CR)  mg TBCR per ER tablet  Care Giver No No   Sig: TAKE 2 TABLETS BY MOUTH (50/200MG) TWICE A DAY (PARKINSONS DISEASE)   citalopram (CeleXA) 20 mg tablet   No No   Si tablet (20 mg total) by Per G Tube route daily   Patient not taking: Reported on 2024   escitalopram (LEXAPRO) 10 mg tablet   Yes No   glucose 40 %   No No   Sig: DISSOLVE IN WARM WATER and administer via peg tube as needed for asymptomatic for blood sugars less than 60 mg/dL and notify endocrine provider. Check blood glucose level in 15 minutes and repeat administration if blood glucose levels are less than 100. Repeat process until blood glucose level is above 100.   glucose blood (OneTouch Verio) test strip   No No   Sig: May substitute brand based on insurance coverage. Check glucose TID.   guaiFENesin (DIABETIC TUSSIN EX) 100 MG/5ML oral liquid   No No   Sig: 10 mL (200 mg total) by Per G Tube route 4 (four) times a day as needed for cough or congestion   hydrocortisone 1 % cream  Care Giver No No   Sig: Apply topically to affected area twice daily as needed for rash   insulin regular (HumuLIN R,NovoLIN R) 100 units/mL injection   No No   Sig: Inject 4 units subcutaneously at 6 PM and midnight while tube feeds are running   insulin regular (HumuLIN R,NovoLIN R) 100 units/mL injection   No No   Sig: Inject 1-5 Units under the skin every 6 (six) hours if 0 - 179 = 0; 180 - 224 = 1 unit; 225 - 299 = 2 unit; 300 - 374 = 3 unit; 375 -  449 = 4 unit; 450+ = 5 unit.  Call MD for BS less than 60 or greater than 450   lansoprazole (PREVACID SOLUTAB) 15 mg disintegrating tablet   No No   Si tablet (15 mg total) by Per G Tube route daily Please dissolve in water.   levothyroxine 25 mcg tablet   No No   Si tablet (25 mcg total) by Per G Tube route daily TAKE 1 TABLET BY MOUTH 1 HOUR BEFORE STARTING TUBE FEEDS (HYPOTHYROIDISM)   methenamine hippurate (HIPREX) 1 g tablet   No No   Sig: Crush 1 tablet two times a day per peg tube   midodrine (PROAMATINE) 5 mg tablet   No No   Si tablet (5 mg total) by Per G Tube route 3 (three) times a day For hypotension, hold for systolic blood pressure greater than 110.   neomycin-bacitracin-polymyxin b (NEOSPORIN) ointment  Care Giver Yes No   Sig: Apply 1 application topically 2 (two) times a day as needed Apply to affected area BID PRN   olopatadine HCl (PATADAY) 0.2 % opth drops  Care Giver Yes No   Sig: Administer 1 drop to both eyes as needed Instill 1 drop into affected eyes daily PRN for eye redness   oxybutynin (DITROPAN) 5 mg tablet   No No   Si tablet (5 mg total) by Per G Tube route 3 (three) times a day   polyethylene glycol (GLYCOLAX) 17 GM/SCOOP powder   No No   Si g by Per G Tube route daily   simvastatin (ZOCOR) 20 mg tablet   No No   Sig: Daily at 4:00 PM.   traZODone (DESYREL) 100 mg tablet   No No   Si tablet (100 mg total) by Per G Tube route daily at bedtime   valproic acid (DEPAKENE) 250 MG/5ML soln   No No   Si.5 mL (125 mg total) by Per G Tube route 2 (two) times a day      Facility-Administered Medications: None     Patient's Medications   Discharge Prescriptions    No medications on file     No discharge procedures on file.  ED SEPSIS DOCUMENTATION            Lisa Carty MD  [phone]

## 2024-11-23 NOTE — ED ATTENDING ATTESTATION
11/22/2024  IRocio DO, saw and evaluated the patient. I have discussed the patient with the resident/non-physician practitioner and agree with the resident's/non-physician practitioner's findings, Plan of Care, and MDM as documented in the resident's/non-physician practitioner's note, except where noted. All available labs and Radiology studies were reviewed.  I was present for key portions of any procedure(s) performed by the resident/non-physician practitioner and I was immediately available to provide assistance.       At this point I agree with the current assessment done in the Emergency Department.  I have conducted an independent evaluation of this patient a history and physical is as follows:    ED Course   64-year-old female presents to the emergency department for evaluation from her place of living after staff were concerned about a gurgling noise in her chest after she had an episode of hiccups.  Patient was recently hospitalized and had suspected aspiration pneumonitis.  Caregivers also note the patient seemed a bit more lethargic than normal and she had an elevated blood sugar this morning.  They do report that her sugars can be labile but are fairly well-controlled.  Patient is receiving nightly tube feeds and fluid flushes.  They have been monitoring her sodium level closely.  Patient is nonverbal.  No reported change in bowel habits.  Staff do prefer to care for the patient at home as she did develop complications when she was hospitalized which was thought to be related to aspiration.    Past medical history: Cerebral palsy, intellectual disability, type 2 diabetes, pneumonia    Physical exam: Patient is awake but drifts off to sleep frequently during my exam.  Pupils are equal and reactive.  Mucous membranes are slightly dry.  Neck is supple.  Heart is regular rate and rhythm.  PEG tube is in place insertion site is clean and dry.  Lungs are clear to auscultation.  Skin is warm and dry.   Lower extremities muscles are atrophied.    Assessment/plan: Will check labs to evaluate for abnormal sodium level and other possible abnormal electrolytes levels.  Will check chest x-ray though if patient had an aspiration event this morning I would not expect that there would be radiographic evidence of that as of yet.    Update: Patient's workup is reassuring.  Chest x-ray is clear.  Electrolyte abnormality is not currently present and her renal function appears improved.  Patient had a maximum temperature of 100 degrees rectally while in the department.  Caregivers will continue to monitor patient at her place of living and return should she develop difficulty breathing or fever.  Critical Care Time  Procedures

## 2024-11-24 ENCOUNTER — RESULTS FOLLOW-UP (OUTPATIENT)
Dept: LABOR AND DELIVERY | Facility: HOSPITAL | Age: 64
End: 2024-11-24

## 2024-11-25 ENCOUNTER — APPOINTMENT (EMERGENCY)
Dept: RADIOLOGY | Facility: HOSPITAL | Age: 64
End: 2024-11-25
Payer: MEDICARE

## 2024-11-25 ENCOUNTER — HOSPITAL ENCOUNTER (EMERGENCY)
Facility: HOSPITAL | Age: 64
Discharge: HOME | End: 2024-11-25
Attending: EMERGENCY MEDICINE
Payer: MEDICARE

## 2024-11-25 VITALS
RESPIRATION RATE: 20 BRPM | WEIGHT: 93.92 LBS | HEART RATE: 85 BPM | OXYGEN SATURATION: 96 % | BODY MASS INDEX: 18.97 KG/M2 | DIASTOLIC BLOOD PRESSURE: 54 MMHG | SYSTOLIC BLOOD PRESSURE: 97 MMHG | TEMPERATURE: 100.5 F

## 2024-11-25 DIAGNOSIS — J69.0 ASPIRATION PNEUMONITIS (HCC): Primary | ICD-10-CM

## 2024-11-25 PROCEDURE — 99284 EMERGENCY DEPT VISIT MOD MDM: CPT | Performed by: EMERGENCY MEDICINE

## 2024-11-25 PROCEDURE — 71045 X-RAY EXAM CHEST 1 VIEW: CPT

## 2024-11-25 PROCEDURE — 93005 ELECTROCARDIOGRAM TRACING: CPT

## 2024-11-25 PROCEDURE — 99285 EMERGENCY DEPT VISIT HI MDM: CPT

## 2024-11-25 RX ORDER — ACETAMINOPHEN 160 MG/5ML
650 SUSPENSION ORAL ONCE
Status: COMPLETED | OUTPATIENT
Start: 2024-11-25 | End: 2024-11-25

## 2024-11-25 RX ORDER — METRONIDAZOLE 500 MG/1
500 TABLET ORAL ONCE
Status: COMPLETED | OUTPATIENT
Start: 2024-11-25 | End: 2024-11-25

## 2024-11-25 RX ORDER — DOXYCYCLINE 100 MG/1
100 CAPSULE ORAL 2 TIMES DAILY
Qty: 20 CAPSULE | Refills: 0 | Status: SHIPPED | OUTPATIENT
Start: 2024-11-25 | End: 2024-12-05

## 2024-11-25 RX ORDER — DOXYCYCLINE 100 MG/1
100 CAPSULE ORAL ONCE
Status: COMPLETED | OUTPATIENT
Start: 2024-11-25 | End: 2024-11-25

## 2024-11-25 RX ORDER — METRONIDAZOLE 500 MG/1
500 TABLET ORAL 3 TIMES DAILY
Qty: 30 TABLET | Refills: 0 | Status: SHIPPED | OUTPATIENT
Start: 2024-11-25 | End: 2024-12-05

## 2024-11-25 RX ADMIN — METRONIDAZOLE 500 MG: 500 TABLET ORAL at 20:52

## 2024-11-25 RX ADMIN — ACETAMINOPHEN 650 MG: 650 SUSPENSION ORAL at 20:52

## 2024-11-25 RX ADMIN — DOXYCYCLINE 100 MG: 100 CAPSULE ORAL at 20:53

## 2024-11-26 LAB
ATRIAL RATE: 91 BPM
P AXIS: -14 DEGREES
PR INTERVAL: 128 MS
QRS AXIS: 56 DEGREES
QRSD INTERVAL: 78 MS
QT INTERVAL: 346 MS
QTC INTERVAL: 425 MS
T WAVE AXIS: 40 DEGREES
VENTRICULAR RATE: 91 BPM

## 2024-11-26 PROCEDURE — 93010 ELECTROCARDIOGRAM REPORT: CPT | Performed by: INTERNAL MEDICINE

## 2024-11-26 NOTE — ED PROVIDER NOTES
Time reflects when diagnosis was documented in both MDM as applicable and the Disposition within this note       Time User Action Codes Description Comment    11/25/2024  8:33 PM Brian Nunez Add [J69.0] Aspiration pneumonitis (HCC)           ED Disposition       ED Disposition   Discharge    Condition   Stable    Date/Time   Mon Nov 25, 2024  8:33 PM    Comment   Terrie Alicea discharge to home/self care.                   Assessment & Plan       Medical Decision Making        Initial ED assessment:   64-year-old female, nonverbal, brought in by caregiver, had a fever earlier today was concerned about pneumonia, had an aspiration episode a few days prior.    Pathology at risk for includes but is not limited to:   Pneumonia, aspiration pneumonia, doubt intra-abdominal infection as abdomen is nontender    Initial ED plan:   As patient is clinically been improving as per caregiver think her pneumonia is less likely caregiver very concerned about this as she has been septic in the past and still has a mild cough.  Will check chest x-ray        Final ED summary/disposition:   After evaluation and workup in the emergency department, ultimately discharged on doxycycline and Flagyl for concern for aspiration pneumonia although no evidence on x-ray likely a degree of aspiration pneumonitis, will discharge    Amount and/or Complexity of Data Reviewed  Radiology: ordered and independent interpretation performed.    Risk  OTC drugs.  Prescription drug management.             Medications   doxycycline hyclate (VIBRAMYCIN) capsule 100 mg (has no administration in time range)   metroNIDAZOLE (FLAGYL) tablet 500 mg (has no administration in time range)   acetaminophen (TYLENOL) oral suspension 650 mg (has no administration in time range)       ED Risk Strat Scores                                               History of Present Illness       Chief Complaint   Patient presents with    Fever     Was seen on Friday after she  has an aspiration episode, nurse was concerned she may have had aspiration pneumonia. Felt like she has done better over the weekend but today low grade temp       Past Medical History:   Diagnosis Date    Acute metabolic encephalopathy 12/11/2015    Adjustment disorder     SCAR (acute kidney injury) ([facility]) 05/10/2024    Altered mental status 12/11/2015    Anemia     Bipolar 1 disorder ([facility])     Cerebral palsy ([facility])     Chronic hypernatremia 02/06/2016    Closed fracture of left hip ([facility]) 01/19/2016    Closed left hip fracture ([facility])     no surgery    Constipation     Dehydration 02/20/2016    Developmental delay     Diabetes mellitus ([facility])     Difficulty walking     Disease of thyroid gland     Diverticulosis     Dysphagia     Worsening    Fracture of multiple ribs of right side     Herpes zoster without complication 06/02/2021    Hip fracture ([facility]) 07/26/2015    left    Hypercalcemia 08/19/2021    Hyperlipidemia     Hypernatremia 12/28/2018    Hypotension     Impulse control disorder     Incontinence     Intellectual disability due to developmental disorder, unspecified     Microalbuminuria     Neuropathy in diabetes ([facility])     Osteopathia     Osteoporosis     Peripheral neuropathy     Pneumonia 12/28/2018    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[facility])     Thrombocytopenia ([facility]) 07/31/2015      Past Surgical History:   Procedure Laterality Date    ABDOMINAL SURGERY      COLECTOMY MIN      re-anastomosis 7/22/16    COLON SURGERY  1/31/16    COLONOSCOPY N/A 07/21/2016    Procedure: COLONOSCOPY;  Surgeon: Rosalino Jacome MD;  Location: [facility];  Service:     COLONOSCOPY N/A 01/31/2016    Procedure: COLONOSCOPY;  Surgeon: Rosalino Jacome MD;  [facility];  Service:     COLONOSCOPY N/A 07/25/2017    Procedure: COLONOSCOPY;  Surgeon: Rosalino Jacome MD;  Location: [facility];  Service: Colorectal    COLOSTOMY      EXPLORATORY LAPAROTOMY W/ BOWEL RESECTION N/A 01/31/2016    Procedure: exploratory laparotomy, left  sigmoidectomy, coloproctostomy, take down splenic flexure, loop colostomy;  Surgeon: Rosalino Jacome MD;  [facility];  Service:     GASTROSTOMY TUBE PLACEMENT N/A 8/26/2024    Procedure: INSERTION GASTROSTOMY TUBE OPEN;  Surgeon: Monroe Jean DO;  [facility];  Service: General    MO CLOSURE ENTEROSTOMY LG/SMALL INTESTINE N/A 07/22/2016    Procedure: SEGMENTAL COLECTOMY WITH COLOCOLOSTOMY;  Surgeon: Rosalino Jacome MD;  [facility]: [facility];  Service: Colorectal    UPPER GASTROINTESTINAL ENDOSCOPY        Family History   Problem Relation Age of Onset    Alcohol abuse Mother     Alcohol abuse Father     Diabetes Sister     No Known Problems Sister     No Known Problems Sister       Social History     Tobacco Use    Smoking status: Never    Smokeless tobacco: Never   Vaping Use    Vaping status: Never Used   Substance Use Topics    Alcohol use: Not Currently    Drug use: No      E-Cigarette/Vaping    E-Cigarette Use Never User       E-Cigarette/Vaping Substances    Nicotine No     THC No     CBD No     Flavoring No     Other No     Unknown No       I have reviewed and agree with the history as documented.         64-year-old female, nonverbal, brought in by caregiver for concern for aspiration pneumonia.  She was seen here a few days prior for similar after an aspiration episode, since then has actually been improving but today developed a fever of 101 so she brought here for evaluation, states she is actually more alert, completely at her baseline, which is nonverbal and lipsmacking and subtle movements, no falls no injuries, normal urinary output normal bowel movements.  Taking tube feeds without difficulty no further aspiration episodes      Fever      Review of Systems   Unable to perform ROS: Patient nonverbal           Objective       ED Triage Vitals [11/25/24 1937]   Temperature Pulse Blood Pressure Respirations SpO2 Patient Position - Orthostatic VS   100.5 °F (38.1 °C) 94  124/63 20 95 % Sitting      Temp Source Heart Rate Source BP Location FiO2 (%) Pain Score    Rectal Monitor Right arm -- --      Vitals      Date and Time Temp Pulse SpO2 Resp BP Pain Score FACES Pain Rating User   11/25/24 2000 -- 84 98 % -- 103/59 -- -- DB   11/25/24 1945 -- 87 96 % -- 124/63 -- -- DB   11/25/24 1937 100.5 °F (38.1 °C) 94 95 % 20 124/63 -- -- DB            Physical Exam  Vitals reviewed.   Constitutional:       General: She is not in acute distress.     Appearance: She is well-developed. She is not diaphoretic.   HENT:      Head: Normocephalic and atraumatic.      Right Ear: External ear normal.      Left Ear: External ear normal.      Nose: Nose normal.   Eyes:      General:         Right eye: No discharge.         Left eye: No discharge.      Pupils: Pupils are equal, round, and reactive to light.   Neck:      Trachea: No tracheal deviation.   Cardiovascular:      Rate and Rhythm: Normal rate and regular rhythm.      Heart sounds: Normal heart sounds. No murmur heard.  Pulmonary:      Effort: Pulmonary effort is normal. No respiratory distress.      Breath sounds: Normal breath sounds. No stridor.   Abdominal:      General: There is no distension.      Palpations: Abdomen is soft.      Tenderness: There is no abdominal tenderness. There is no guarding or rebound.   Musculoskeletal:         General: Normal range of motion.      Cervical back: Normal range of motion and neck supple.   Skin:     General: Skin is warm and dry.      Coloration: Skin is not pale.      Findings: No erythema.   Neurological:      General: No focal deficit present.      Mental Status: She is alert.      Comments: At baseline as per caregiver         Results Reviewed       None            XR chest 1 view portable   ED Interpretation by Brian Nunez DO (11/25 2033)   No evidence of pneumonia at this time.  Large amount of intraintestinal air          Procedures    ED Medication and Procedure Management   Prior to  Admission Medications   Prescriptions Last Dose Informant Patient Reported? Taking?   ARIPiprazole (ABILIFY) 2 mg tablet  Care Giver Yes No   Si tablet by Per G Tube route daily at bedtime   Alcohol Swabs 70 % PADS   No No   Sig: May substitute brand based on insurance coverage. Check glucose TID.   Ascorbic Acid (vitamin C) 1000 MG tablet   No No   Si tablet (1,000 mg total) by Per G Tube route daily   Blood Glucose Monitoring Suppl (OneTouch Verio Reflect) w/Device KIT   No No   Sig: May substitute brand based on insurance coverage. Check glucose TID.   Dextrose, Diabetic Use, 15 GM/33GM GEL   No No   Sig: Take 1 Dose by mouth daily as needed (Please give as needed for blood sugars between 61 and 80 and recheck blood sugar in 20 minutes)   Ferrous Sulfate 300 (60 Fe) mg/5 mL solution   No No   Si mL (300 mg total) by Per G Tube route every other day   Glucagon (Gvoke HypoPen 2-Pack) 0.5 MG/0.1ML SOAJ   No No   Sig: Inject 1 mg under the skin every 15 (fifteen) minutes as needed (for symptomatic blood glucose <60 and/or if patient is unconcious or seizing) Call 911 after use of pen and then notify Endocrinology provider   Gvoke HypoPen 2-Pack 1 MG/0.2ML SOAJ   Yes No   Patient not taking: Reported on 2024   Insulin Pen Needle (BD Pen Needle Yue 2nd Gen) 32G X 4 MM MISC   No No   Sig: Use to inject insulin 4 times daily   LORazepam (Ativan) 0.5 mg tablet   No No   Si tablet (0.5 mg total) by Per G Tube route daily at bedtime for 10 days Hold for sedation   NovoLIN R FlexPen 100 UNIT/ML SOPN   Yes No   Patient not taking: Reported on 2024   OneTouch Delica Lancets 33G MISC   No No   Sig: May substitute brand based on insurance coverage. Check glucose TID.   Refresh Liquigel 1 % GEL  Care Giver Yes No   Si (two) times a day 1 drop Bid bilaterally   Vitamins A & D (VITAMIN A & D) ointment  Care Giver Yes No   Sig: Apply topically as needed for skin irritation   baclofen 10 mg tablet    No No   Si tablet (10 mg total) by Per G Tube route 3 (three) times a day   carbidopa-levodopa (SINEMET CR)  mg TBCR per ER tablet  Care Giver No No   Sig: TAKE 2 TABLETS BY MOUTH (50/200MG) TWICE A DAY (PARKINSONS DISEASE)   citalopram (CeleXA) 20 mg tablet   No No   Si tablet (20 mg total) by Per G Tube route daily   Patient not taking: Reported on 2024   escitalopram (LEXAPRO) 10 mg tablet   Yes No   glucose 40 %   No No   Sig: DISSOLVE IN WARM WATER and administer via peg tube as needed for asymptomatic for blood sugars less than 60 mg/dL and notify endocrine provider. Check blood glucose level in 15 minutes and repeat administration if blood glucose levels are less than 100. Repeat process until blood glucose level is above 100.   glucose blood (OneTouch Verio) test strip   No No   Sig: May substitute brand based on insurance coverage. Check glucose TID.   guaiFENesin (DIABETIC TUSSIN EX) 100 MG/5ML oral liquid   No No   Sig: 10 mL (200 mg total) by Per G Tube route 4 (four) times a day as needed for cough or congestion   hydrocortisone 1 % cream  Care Giver No No   Sig: Apply topically to affected area twice daily as needed for rash   insulin regular (HumuLIN R,NovoLIN R) 100 units/mL injection   No No   Sig: Inject 4 units subcutaneously at 6 PM and midnight while tube feeds are running   insulin regular (HumuLIN R,NovoLIN R) 100 units/mL injection   No No   Sig: Inject 1-5 Units under the skin every 6 (six) hours if 0 - 179 = 0; 180 - 224 = 1 unit; 225 - 299 = 2 unit; 300 - 374 = 3 unit; 375 - 449 = 4 unit; 450+ = 5 unit.  Call MD for BS less than 60 or greater than 450   lansoprazole (PREVACID SOLUTAB) 15 mg disintegrating tablet   No No   Si tablet (15 mg total) by Per G Tube route daily Please dissolve in water.   levothyroxine 25 mcg tablet   No No   Si tablet (25 mcg total) by Per G Tube route daily TAKE 1 TABLET BY MOUTH 1 HOUR BEFORE STARTING TUBE FEEDS (HYPOTHYROIDISM)    methenamine hippurate (HIPREX) 1 g tablet   No No   Sig: Crush 1 tablet two times a day per peg tube   midodrine (PROAMATINE) 5 mg tablet   No No   Si tablet (5 mg total) by Per G Tube route 3 (three) times a day For hypotension, hold for systolic blood pressure greater than 110.   neomycin-bacitracin-polymyxin b (NEOSPORIN) ointment  Care Giver Yes No   Sig: Apply 1 application topically 2 (two) times a day as needed Apply to affected area BID PRN   olopatadine HCl (PATADAY) 0.2 % opth drops  Care Giver Yes No   Sig: Administer 1 drop to both eyes as needed Instill 1 drop into affected eyes daily PRN for eye redness   oxybutynin (DITROPAN) 5 mg tablet   No No   Si tablet (5 mg total) by Per G Tube route 3 (three) times a day   polyethylene glycol (GLYCOLAX) 17 GM/SCOOP powder   No No   Si g by Per G Tube route daily   simvastatin (ZOCOR) 20 mg tablet   No No   Sig: Daily at 4:00 PM.   traZODone (DESYREL) 100 mg tablet   No No   Si tablet (100 mg total) by Per G Tube route daily at bedtime   valproic acid (DEPAKENE) 250 MG/5ML soln   No No   Si.5 mL (125 mg total) by Per G Tube route 2 (two) times a day      Facility-Administered Medications: None     Patient's Medications   Discharge Prescriptions    DOXYCYCLINE HYCLATE (VIBRAMYCIN) 100 MG CAPSULE    Take 1 capsule (100 mg total) by mouth 2 (two) times a day for 10 days       Start Date: 2024End Date: 2024       Order Dose: 100 mg       Quantity: 20 capsule    Refills: 0    METRONIDAZOLE (FLAGYL) 500 MG TABLET    Take 1 tablet (500 mg total) by mouth 3 (three) times a day for 10 days       Start Date: 2024End Date: 2024       Order Dose: 500 mg       Quantity: 30 tablet    Refills: 0     No discharge procedures on file.  ED SEPSIS DOCUMENTATION   Time reflects when diagnosis was documented in both MDM as applicable and the Disposition within this note       Time User Action Codes Description Comment    2024  8:33  PM Brian Nunez Add [J69.0] Aspiration pneumonitis (HCC)                  Brian Nunez,   11/25/24 204

## 2024-11-30 PROBLEM — R50.9 FEVER OF UNKNOWN ORIGIN: Status: RESOLVED | Noted: 2024-09-15 | Resolved: 2024-11-30

## 2024-12-03 ENCOUNTER — RA CDI HCC (OUTPATIENT)
Dept: OTHER | Facility: HOSPITAL | Age: 64
End: 2024-12-03

## 2024-12-05 ENCOUNTER — OFFICE VISIT (OUTPATIENT)
Dept: FAMILY MEDICINE CLINIC | Facility: CLINIC | Age: 64
End: 2024-12-05

## 2024-12-05 VITALS
HEART RATE: 79 BPM | RESPIRATION RATE: 18 BRPM | TEMPERATURE: 97.6 F | HEIGHT: 59 IN | SYSTOLIC BLOOD PRESSURE: 103 MMHG | DIASTOLIC BLOOD PRESSURE: 67 MMHG | BODY MASS INDEX: 17.74 KG/M2 | WEIGHT: 88 LBS | OXYGEN SATURATION: 97 %

## 2024-12-05 DIAGNOSIS — Z09 HOSPITAL DISCHARGE FOLLOW-UP: ICD-10-CM

## 2024-12-05 DIAGNOSIS — Z87.01 HISTORY OF ASPIRATION PNEUMONIA: Primary | ICD-10-CM

## 2024-12-05 PROCEDURE — 99213 OFFICE O/P EST LOW 20 MIN: CPT | Performed by: FAMILY MEDICINE

## 2024-12-05 PROCEDURE — G2211 COMPLEX E/M VISIT ADD ON: HCPCS | Performed by: FAMILY MEDICINE

## 2024-12-05 NOTE — ASSESSMENT & PLAN NOTE
Improved and back to baseline. Follow up from ED visit on 11/25 for aspiration pneumonia. Reviewed chest xray from 11/25 which showed no acute cardiopulmonary disease, no sign of infiltrate. Patient was treated for aspiration pneumonia with flagyl and doxycycline which she will complete full course of after today's dose.     Plan:  -Continue with tube feeds  - Has repeat barium swallow study previously scheduled, care giver states will call to schedule  -Finish both antibiotics as prescribed   - Has follow up with PCP in 1 week for follow up of chronic diagnosis

## 2024-12-05 NOTE — PROGRESS NOTES
Name: Terrie Alicea      : 1960      MRN: [id number]  Encounter Provider: Melissa Ayon MD  Encounter Date: 2024   [facility]  :  Assessment & Plan  History of aspiration pneumonia  Improved and back to baseline. Follow up from ED visit on  for aspiration pneumonia. Reviewed chest xray from  which showed no acute cardiopulmonary disease, no sign of infiltrate. Patient was treated for aspiration pneumonia with flagyl and doxycycline which she will complete full course of after today's dose.     Plan:  -Continue with tube feeds  - Has repeat barium swallow study previously scheduled, care giver states will call to schedule  -Finish both antibiotics as prescribed   - Has follow up with PCP in 1 week for follow up of chronic diagnosis          Hospital discharge follow-up                History of Present Illness     Patient is a 64 year old woman here today for follow-up after ED visit for aspiration pneumonitis. Caregiver is present and patient is nonverbal. Per caregiver, initial episode of aspiration happened while in the hospital and the patient experienced extreme lethargy, wet cough and temperatures that were elevated from her baseline into the 100 degree F range. At that point the care giver took patient to the ED on  for evaluation then again on  for worsening of symptoms. Patient was given flagyl and doxycycline for which she will complete her last dose of each today.  Today caregiver reports that patient has been doing significantly better since discharge on Doxycycline and Flagyl. She has been afebrile, had no episodes of coughing, no lethargy, and has no apparent respiratory difficulties. She states that she is back to herself and her baseline and how she was prior to this event. Patient is currently NPO and has a feeding tube. Per caregiver, she would like to look into a follow up barium swallow study  "to see if patient can be transitioned back to some pureed foods by mouth.       Review of Systems   Reason unable to perform ROS: nonverbal.   All other systems reviewed and are negative.         Objective   /67 (BP Location: Left arm, Patient Position: Sitting, Cuff Size: Standard)   Pulse 79   Temp 97.6 °F (36.4 °C) (Temporal)   Resp 18   Ht 4' 11\" (1.499 m)   Wt 39.9 kg (88 lb)   LMP 11/29/2009 (Exact Date)   SpO2 97%   BMI 17.77 kg/m²      Physical Exam  Vitals reviewed.   Constitutional:       General: She is not in acute distress.     Appearance: She is not ill-appearing.   HENT:      Head: Normocephalic and atraumatic.   Cardiovascular:      Rate and Rhythm: Normal rate and regular rhythm.      Pulses: Normal pulses.      Heart sounds: Normal heart sounds. No murmur heard.  Pulmonary:      Effort: Pulmonary effort is normal. No respiratory distress.      Breath sounds: Normal breath sounds. No stridor. No wheezing or rhonchi.   Abdominal:      General: Abdomen is flat. Bowel sounds are normal.      Palpations: Abdomen is soft.   Musculoskeletal:      Right lower leg: No edema.      Left lower leg: No edema.   Skin:     General: Skin is warm and dry.   Neurological:      Mental Status: She is alert. Mental status is at baseline.       "

## 2024-12-06 ENCOUNTER — CONSULT (OUTPATIENT)
Dept: CARDIOLOGY CLINIC | Facility: CLINIC | Age: 64
End: 2024-12-06
Payer: MEDICARE

## 2024-12-06 ENCOUNTER — TELEPHONE (OUTPATIENT)
Dept: CARDIOLOGY CLINIC | Facility: CLINIC | Age: 64
End: 2024-12-06

## 2024-12-06 VITALS
BODY MASS INDEX: 17.74 KG/M2 | DIASTOLIC BLOOD PRESSURE: 62 MMHG | SYSTOLIC BLOOD PRESSURE: 96 MMHG | HEIGHT: 59 IN | WEIGHT: 88 LBS

## 2024-12-06 DIAGNOSIS — I95.9 HYPOTENSION, UNSPECIFIED HYPOTENSION TYPE: Primary | ICD-10-CM

## 2024-12-06 DIAGNOSIS — R53.2 FUNCTIONAL QUADRIPLEGIA (HCC): ICD-10-CM

## 2024-12-06 DIAGNOSIS — E78.2 MIXED HYPERLIPIDEMIA: ICD-10-CM

## 2024-12-06 DIAGNOSIS — E43 SEVERE PROTEIN-CALORIE MALNUTRITION (HCC): Primary | ICD-10-CM

## 2024-12-06 PROCEDURE — 99204 OFFICE O/P NEW MOD 45 MIN: CPT | Performed by: INTERNAL MEDICINE

## 2024-12-06 RX ORDER — MIDODRINE HYDROCHLORIDE 5 MG/1
5 TABLET ORAL 3 TIMES DAILY
Qty: 84 TABLET | Refills: 3 | Status: SHIPPED | OUTPATIENT
Start: 2024-12-06 | End: 2025-01-03

## 2024-12-06 NOTE — PROGRESS NOTES
Cardiology Clinic Visit Note  Terrie Alicea 64 y.o. female   MRN: [id number]    Assessment and Plan      Diagnoses and all orders for this visit:    Hypotension, unspecified hypotension type  Terrie has longstanding history of hypotension, previously attributed to low-dose lisinopril she was receiving for proteinuria/diabetes.  Since discontinuation of this medication in 2018, blood pressure has been low stable with baseline systolic blood pressure in the  at her outpatient office visits.  Recently was initiated on midodrine 5 mg 3 times daily, with hold parameter of systolic blood pressure greater than 110.  It is unclear and tough to say if she is symptomatic from low blood pressure as she is nonambulatory.  She had a normal echocardiogram in 2016.  No current cardiopulmonary concerns as per her caregiver.  Will continue her on midodrine 5 mg 3 times daily, decrease hold parameters to SBP >100.  Refill sent to the patient's preferred pharmacy.  Continue to monitor blood pressure at home.  In the future, could consider taking her off of midodrine based of her blood pressure readings at home.  -     Ambulatory Referral to Cardiology  -     midodrine (PROAMATINE) 5 mg tablet; 1 tablet (5 mg total) by Per G Tube route 3 (three) times a day for 28 days For hypotension, hold for systolic blood pressure greater than 100.    Mixed hyperlipidemia  Lab Results   Component Value Date    CHOLESTEROL 162 09/14/2022    TRIG 198 (H) 09/14/2022    HDL 46 (L) 09/14/2022    LDLCALC 76 09/14/2022   -     Lipid Panel with Direct LDL reflex; Future        Schedule a follow-up appointment in 3 months.     Chief Complaint: Hypotension       History of Present Illness:    It's my pleasure meeting Terrie Alicea who is a 64 y.o. patient, referred by her PCP Dr Dodd for for evaluation of hypotension.    Patient has past medical history including but not limited to of cerebral palsy, minimally verbal at baseline, wheelchair/PEG  dependent, history of Parkinson's disease, diabetes mellitus, malnutrition, chronic anemia and hyperlipidemia.  She resides at [facility] which is an intermittent care facility with 24/7 nursing care.  She is here today with her caregiver Lisa who have known her for over 16 years.    At baseline, Terrie is alert, interactive, says few words/waves and can point to pain if she is experiencing any.  She is currently wheelchair-bound but they are trying to get physical therapy and trying to get her stand up for a little bit.  Patient was last seen in our office in 2018 by Dr. Weiss.  She initially saw Dr. Weiss in 2016 for hypotension.  At that time she was on low-dose lisinopril for renal protection in the setting of diabetes and proteinuria.  Echocardiogram at that time was unremarkable.  With discontinuation of lisinopril, blood pressure improved and she has not needed to follow with us.  Since May of this year, Terrie was hospitalized multiple times for different issues including fever, sepsis from urinary tract infection, pneumonia, aspiration, intramuscular hematoma attributed to subcu heparin for DVT prophylaxis, hypercalcemia and SCAR.  During one of her hospitalization, she was briefly in the ICU for vasopressor support for hypotension.  At that time she was prescribed midodrine which she is currently receiving.   She was seen last in the ED on 11/25 for fever and concern for aspiration.  She was given a 10-day course of metronidazole and doxycycline..  Her caregiver, she is the best that she has been since August after completing antibiotic course and she is back to her baseline and starting today with.  No complaints or concerns from her caregiver at this time.      Previous Cardiology Workup:  TREADMILL STRESS  No results found for this or any previous visit.     ----------------------------------------------------------------------------------------------  NUCLEAR STRESS TEST: No  results found for this or any previous visit.    No results found for this or any previous visit.      --------------------------------------------------------------------------------  CATH:  No results found for this or any previous visit.    --------------------------------------------------------------------------------  ECHO:   No results found for this or any previous visit.    No results found for this or any previous visit.    --------------------------------------------------------------------------------  HOLTER  No results found for this or any previous visit.    --------------------------------------------------------------------------------  CAROTIDS  No results found for this or any previous visit.       ---------------------------------------------------------------------------------  Review of Systems   Unable to perform ROS: Patient nonverbal         Current Outpatient Medications:     Alcohol Swabs 70 % PADS, May substitute brand based on insurance coverage. Check glucose TID., Disp: 100 each, Rfl: 0    ARIPiprazole (ABILIFY) 2 mg tablet, 1 tablet by Per G Tube route daily at bedtime, Disp: , Rfl:     Ascorbic Acid (vitamin C) 1000 MG tablet, 1 tablet (1,000 mg total) by Per G Tube route daily, Disp: 90 tablet, Rfl: 3    baclofen 10 mg tablet, 1 tablet (10 mg total) by Per G Tube route 3 (three) times a day, Disp: 84 tablet, Rfl: 0    Blood Glucose Monitoring Suppl (OneTouch Verio Reflect) w/Device KIT, May substitute brand based on insurance coverage. Check glucose TID., Disp: 1 kit, Rfl: 0    carbidopa-levodopa (SINEMET CR)  mg TBCR per ER tablet, TAKE 2 TABLETS BY MOUTH (50/200MG) TWICE A DAY (PARKINSONS DISEASE), Disp: 112 tablet, Rfl: 10    citalopram (CeleXA) 20 mg tablet, 1 tablet (20 mg total) by Per G Tube route daily (Patient not taking: Reported on 12/5/2024), Disp: 30 tablet, Rfl: 1    Dextrose, Diabetic Use, 15 GM/33GM GEL, Take 1 Dose by mouth daily as needed (Please give as  needed for blood sugars between 61 and 80 and recheck blood sugar in 20 minutes), Disp: 33 g, Rfl: 6    escitalopram (LEXAPRO) 10 mg tablet, , Disp: , Rfl:     Ferrous Sulfate 300 (60 Fe) mg/5 mL solution, 5 mL (300 mg total) by Per G Tube route every other day, Disp: 38 mL, Rfl: 0    Glucagon (Gvoke HypoPen 2-Pack) 0.5 MG/0.1ML SOAJ, Inject 1 mg under the skin every 15 (fifteen) minutes as needed (for symptomatic blood glucose <60 and/or if patient is unconcious or seizing) Call 911 after use of pen and then notify Endocrinology provider, Disp: 0.2 mL, Rfl: 0    glucose 40 %, DISSOLVE IN WARM WATER and administer via peg tube as needed for asymptomatic for blood sugars less than 60 mg/dL and notify endocrine provider. Check blood glucose level in 15 minutes and repeat administration if blood glucose levels are less than 100. Repeat process until blood glucose level is above 100., Disp: 12.5 g, Rfl: 1    glucose blood (OneTouch Verio) test strip, May substitute brand based on insurance coverage. Check glucose TID., Disp: 100 each, Rfl: 0    guaiFENesin (DIABETIC TUSSIN EX) 100 MG/5ML oral liquid, 10 mL (200 mg total) by Per G Tube route 4 (four) times a day as needed for cough or congestion, Disp: , Rfl:     Gvoke HypoPen 2-Pack 1 MG/0.2ML SOAJ, , Disp: , Rfl:     hydrocortisone 1 % cream, Apply topically to affected area twice daily as needed for rash, Disp: 30 g, Rfl: 0    Insulin Pen Needle (BD Pen Needle Yue 2nd Gen) 32G X 4 MM MISC, Use to inject insulin 4 times daily, Disp: 400 each, Rfl: 3    insulin regular (HumuLIN R,NovoLIN R) 100 units/mL injection, Inject 4 units subcutaneously at 6 PM and midnight while tube feeds are running, Disp: 3 mL, Rfl: 1    insulin regular (HumuLIN R,NovoLIN R) 100 units/mL injection, Inject 1-5 Units under the skin every 6 (six) hours if 0 - 179 = 0; 180 - 224 = 1 unit; 225 - 299 = 2 unit; 300 - 374 = 3 unit; 375 - 449 = 4 unit; 450+ = 5 unit.  Call MD for BS less than 60 or  greater than 450, Disp: 15 mL, Rfl: 1    lansoprazole (PREVACID SOLUTAB) 15 mg disintegrating tablet, 1 tablet (15 mg total) by Per G Tube route daily Please dissolve in water., Disp: 30 tablet, Rfl: 1    levothyroxine 25 mcg tablet, 1 tablet (25 mcg total) by Per G Tube route daily TAKE 1 TABLET BY MOUTH 1 HOUR BEFORE STARTING TUBE FEEDS (HYPOTHYROIDISM), Disp: , Rfl:     LORazepam (Ativan) 0.5 mg tablet, 1 tablet (0.5 mg total) by Per G Tube route daily at bedtime for 10 days Hold for sedation, Disp: , Rfl:     methenamine hippurate (HIPREX) 1 g tablet, Crush 1 tablet two times a day per peg tube, Disp: , Rfl:     midodrine (PROAMATINE) 5 mg tablet, 1 tablet (5 mg total) by Per G Tube route 3 (three) times a day For hypotension, hold for systolic blood pressure greater than 110., Disp: 90 tablet, Rfl: 1    neomycin-bacitracin-polymyxin b (NEOSPORIN) ointment, Apply 1 application topically 2 (two) times a day as needed Apply to affected area BID PRN, Disp: , Rfl:     NovoLIN R FlexPen 100 UNIT/ML SOPN, , Disp: , Rfl:     olopatadine HCl (PATADAY) 0.2 % opth drops, Administer 1 drop to both eyes as needed Instill 1 drop into affected eyes daily PRN for eye redness, Disp: , Rfl:     OneTouch Delica Lancets 33G MISC, May substitute brand based on insurance coverage. Check glucose TID., Disp: 100 each, Rfl: 0    oxybutynin (DITROPAN) 5 mg tablet, 1 tablet (5 mg total) by Per G Tube route 3 (three) times a day, Disp: , Rfl:     polyethylene glycol (GLYCOLAX) 17 GM/SCOOP powder, 17 g by Per G Tube route daily, Disp: 510 g, Rfl: 0    Refresh Liquigel 1 % GEL, 2 (two) times a day 1 drop Bid bilaterally, Disp: , Rfl:     simvastatin (ZOCOR) 20 mg tablet, Daily at 4:00 PM., Disp: , Rfl:     traZODone (DESYREL) 100 mg tablet, 1 tablet (100 mg total) by Per G Tube route daily at bedtime, Disp: , Rfl:     valproic acid (DEPAKENE) 250 MG/5ML soln, 2.5 mL (125 mg total) by Per G Tube route 2 (two) times a day, Disp: 473 mL, Rfl:  1    Vitamins A & D (VITAMIN A & D) ointment, Apply topically as needed for skin irritation, Disp: , Rfl:   Past Medical History:   Diagnosis Date    Acute metabolic encephalopathy 12/11/2015    Adjustment disorder     SCAR (acute kidney injury) ([facility]) 05/10/2024    Altered mental status 12/11/2015    Anemia     Bipolar 1 disorder ([facility])     Cerebral palsy ([facility])     Chronic hypernatremia 02/06/2016    Closed fracture of left hip ([facility]) 01/19/2016    Closed left hip fracture ([facility])     no surgery    Constipation     Dehydration 02/20/2016    Developmental delay     Diabetes mellitus ([facility])     Difficulty walking     Disease of thyroid gland     Diverticulosis     Dysphagia     Worsening    Fracture of multiple ribs of right side     Herpes zoster without complication 06/02/2021    Hip fracture ([facility]) 07/26/2015    left    Hypercalcemia 08/19/2021    Hyperlipidemia     Hypernatremia 12/28/2018    Hypotension     Impulse control disorder     Incontinence     Intellectual disability due to developmental disorder, unspecified     Microalbuminuria     Neuropathy in diabetes ([facility])     Osteopathia     Osteoporosis     Peripheral neuropathy     Pneumonia 12/28/2018    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[facility])     Thrombocytopenia ([facility]) 07/31/2015     Past Surgical History:   Procedure Laterality Date    ABDOMINAL SURGERY      COLECTOMY MIN      re-anastomosis 7/22/16    COLON SURGERY  1/31/16    COLONOSCOPY N/A 07/21/2016    Procedure: COLONOSCOPY;  Surgeon: Rosalino Jacome MD;  Location: [facility];  Service:     COLONOSCOPY N/A 01/31/2016    Procedure: COLONOSCOPY;  Surgeon: Rosalino Jacome MD;  [facility];  Service:     COLONOSCOPY N/A 07/25/2017    Procedure: COLONOSCOPY;  Surgeon: Rosalino Jacome MD;  Location: [facility];  Service: Colorectal    COLOSTOMY      EXPLORATORY LAPAROTOMY W/ BOWEL RESECTION N/A 01/31/2016    Procedure: exploratory laparotomy, left sigmoidectomy, coloproctostomy, take down splenic flexure, loop  colostomy;  Surgeon: Rosalino Jacome MD;  [facility];  Service:     GASTROSTOMY TUBE PLACEMENT N/A 8/26/2024    Procedure: INSERTION GASTROSTOMY TUBE OPEN;  Surgeon: Monroe Jean DO;  [facility];  Service: General    LA CLOSURE ENTEROSTOMY LG/SMALL INTESTINE N/A 07/22/2016    Procedure: SEGMENTAL COLECTOMY WITH COLOCOLOSTOMY;  Surgeon: Rosalino Jacome MD;  [facility];  Service: Colorectal    UPPER GASTROINTESTINAL ENDOSCOPY       Social History     Socioeconomic History    Marital status: Single     Spouse name: Not on file    Number of children: Not on file    Years of education: Not on file    Highest education level: Not on file   Occupational History    Not on file   Tobacco Use    Smoking status: Never    Smokeless tobacco: Never   Vaping Use    Vaping status: Never Used   Substance and Sexual Activity    Alcohol use: Not Currently    Drug use: No    Sexual activity: Never   Other Topics Concern    Not on file   Social History Narrative    Lives in a group home      Social Drivers of Health     Financial Resource Strain: Low Risk  (5/15/2024)    Overall Financial Resource Strain (CARDIA)     Difficulty of Paying Living Expenses: Not hard at all   Food Insecurity: No Food Insecurity (9/26/2024)    Nursing - Inadequate Food Risk Classification     Worried About Running Out of Food in the Last Year: Never true     Ran Out of Food in the Last Year: Never true     Ran Out of Food in the Last Year: Not on file   Transportation Needs: No Transportation Needs (9/26/2024)    PRAPARE - Transportation     Lack of Transportation (Medical): No     Lack of Transportation (Non-Medical): No   Physical Activity: Inactive (11/2/2023)    Exercise Vital Sign     Days of Exercise per Week: 0 days     Minutes of Exercise per Session: 0 min   Stress: No Stress Concern Present (5/20/2024)    [employer] [place] [employer] - Occupational Stress Questionnaire     Feeling of Stress :  "Not at all   Social Connections: Socially Isolated (11/2/2023)    Social Connection and Isolation Panel [NHANES]     Frequency of Communication with Friends and Family: Never     Frequency of Social Gatherings with Friends and Family: Never     Attends Synagogue Services: Never     Active Member of Clubs or Organizations: No     Attends Club or Organization Meetings: Never     Marital Status: Never    Intimate Partner Violence: Not At Risk (5/20/2024)    Humiliation, Afraid, Rape, and Kick questionnaire     Fear of Current or Ex-Partner: No     Emotionally Abused: No     Physically Abused: No     Sexually Abused: No   Housing Stability: Low Risk  (9/26/2024)    Housing Stability Vital Sign     Unable to Pay for Housing in the Last Year: No     Number of Times Moved in the Last Year: 0     Homeless in the Last Year: No     Family History   Problem Relation Age of Onset    Alcohol abuse Mother     Alcohol abuse Father     Diabetes Sister     No Known Problems Sister     No Known Problems Sister      Allergies   Allergen Reactions    Ingrezza [Valbenazine Tosylate] Other (See Comments)     Behavioral changes per caregiver    Valbenazine Other (See Comments)    Keflex [Cephalexin] GI Intolerance       Objective     There were no vitals filed for this visit.    Physical exam:     Physical Exam  Vitals and nursing note reviewed.   Constitutional:       General: She is not in acute distress.     Comments: Alert, interactive, says \"hi\" when I stated her name.   Cardiovascular:      Rate and Rhythm: Normal rate and regular rhythm.      Heart sounds: No murmur heard.  Pulmonary:      Breath sounds: Normal breath sounds. No wheezing.   Musculoskeletal:      Right lower leg: No edema.      Left lower leg: No edema.   Neurological:      Mental Status: She is alert.         Drea Camarena MD  Cardiology fellow-FY1  ==  PLEASE NOTE:  This encounter was completed utilizing the [employer] Direct Speech Voice Recognition " Software. Grammatical errors, random word insertions, pronoun errors and incomplete sentences are occasional consequences of the system due to software limitations, ambient noise and hardware issues.These may be missed by proof reading prior to affixing electronic signature. Any questions or concerns about the content, text or information contained within the body of this dictation should be directly addressed to the physician for clarification. Please do not hesitate to call me directly if you have any any questions or concerns.

## 2024-12-10 ENCOUNTER — OFFICE VISIT (OUTPATIENT)
Dept: FAMILY MEDICINE CLINIC | Facility: CLINIC | Age: 64
End: 2024-12-10

## 2024-12-10 ENCOUNTER — HOSPITAL ENCOUNTER (EMERGENCY)
Facility: HOSPITAL | Age: 64
Discharge: HOME | End: 2024-12-10
Attending: EMERGENCY MEDICINE
Payer: MEDICARE

## 2024-12-10 ENCOUNTER — APPOINTMENT (EMERGENCY)
Dept: RADIOLOGY | Facility: HOSPITAL | Age: 64
End: 2024-12-10
Payer: MEDICARE

## 2024-12-10 VITALS
DIASTOLIC BLOOD PRESSURE: 66 MMHG | RESPIRATION RATE: 16 BRPM | BODY MASS INDEX: 17.77 KG/M2 | WEIGHT: 88 LBS | OXYGEN SATURATION: 96 % | TEMPERATURE: 97.8 F | SYSTOLIC BLOOD PRESSURE: 95 MMHG | HEART RATE: 77 BPM

## 2024-12-10 VITALS
HEART RATE: 77 BPM | RESPIRATION RATE: 16 BRPM | SYSTOLIC BLOOD PRESSURE: 99 MMHG | OXYGEN SATURATION: 96 % | DIASTOLIC BLOOD PRESSURE: 66 MMHG | TEMPERATURE: 97.8 F

## 2024-12-10 VITALS
TEMPERATURE: 98.4 F | HEART RATE: 76 BPM | RESPIRATION RATE: 16 BRPM | SYSTOLIC BLOOD PRESSURE: 126 MMHG | OXYGEN SATURATION: 97 % | DIASTOLIC BLOOD PRESSURE: 85 MMHG

## 2024-12-10 DIAGNOSIS — Z79.4 DIABETES MELLITUS TYPE 2, INSULIN DEPENDENT (HCC): ICD-10-CM

## 2024-12-10 DIAGNOSIS — E11.9 DIABETES MELLITUS TYPE 2, INSULIN DEPENDENT (HCC): ICD-10-CM

## 2024-12-10 DIAGNOSIS — Z23 ENCOUNTER FOR IMMUNIZATION: ICD-10-CM

## 2024-12-10 DIAGNOSIS — G80.0 SPASTIC QUADRIPLEGIC CEREBRAL PALSY (HCC): ICD-10-CM

## 2024-12-10 DIAGNOSIS — Z46.59 ENCOUNTER FOR CARE RELATED TO FEEDING TUBE: Primary | ICD-10-CM

## 2024-12-10 DIAGNOSIS — Z00.00 MEDICARE ANNUAL WELLNESS VISIT, SUBSEQUENT: Primary | ICD-10-CM

## 2024-12-10 DIAGNOSIS — K94.23 GASTROSTOMY TUBE DYSFUNCTION (HCC): ICD-10-CM

## 2024-12-10 DIAGNOSIS — M81.0 OSTEOPOROSIS, UNSPECIFIED OSTEOPOROSIS TYPE, UNSPECIFIED PATHOLOGICAL FRACTURE PRESENCE: ICD-10-CM

## 2024-12-10 DIAGNOSIS — Z87.01 HISTORY OF ASPIRATION PNEUMONIA: Primary | ICD-10-CM

## 2024-12-10 DIAGNOSIS — E78.2 MIXED HYPERLIPIDEMIA: ICD-10-CM

## 2024-12-10 PROBLEM — Z93.1 COMPLAINT ASSOCIATED WITH GASTRIC TUBE (HCC): Status: ACTIVE | Noted: 2024-12-10

## 2024-12-10 PROBLEM — R68.89 COMPLAINT ASSOCIATED WITH GASTRIC TUBE (HCC): Status: ACTIVE | Noted: 2024-12-10

## 2024-12-10 PROCEDURE — 74018 RADEX ABDOMEN 1 VIEW: CPT

## 2024-12-10 PROCEDURE — G0439 PPPS, SUBSEQ VISIT: HCPCS | Performed by: FAMILY MEDICINE

## 2024-12-10 PROCEDURE — 99283 EMERGENCY DEPT VISIT LOW MDM: CPT

## 2024-12-10 PROCEDURE — 99213 OFFICE O/P EST LOW 20 MIN: CPT | Performed by: FAMILY MEDICINE

## 2024-12-10 PROCEDURE — 99284 EMERGENCY DEPT VISIT MOD MDM: CPT | Performed by: EMERGENCY MEDICINE

## 2024-12-10 PROCEDURE — 90471 IMMUNIZATION ADMIN: CPT | Performed by: FAMILY MEDICINE

## 2024-12-10 PROCEDURE — G2211 COMPLEX E/M VISIT ADD ON: HCPCS | Performed by: FAMILY MEDICINE

## 2024-12-10 PROCEDURE — NC001 PR NO CHARGE: Performed by: SURGERY

## 2024-12-10 PROCEDURE — 90673 RIV3 VACCINE NO PRESERV IM: CPT | Performed by: FAMILY MEDICINE

## 2024-12-10 RX ADMIN — IOHEXOL 50 ML: 300 INJECTION, SOLUTION INTRAVENOUS at 18:01

## 2024-12-10 NOTE — PATIENT INSTRUCTIONS
Medicare Preventive Visit Patient Instructions  Thank you for completing your Welcome to Medicare Visit or Medicare Annual Wellness Visit today. Your next wellness visit will be due in one year (12/11/2025).  The screening/preventive services that you may require over the next 5-10 years are detailed below. Some tests may not apply to you based off risk factors and/or age. Screening tests ordered at today's visit but not completed yet may show as past due. Also, please note that scanned in results may not display below.  Preventive Screenings:  Service Recommendations Previous Testing/Comments   Colorectal Cancer Screening  * Colonoscopy    * Fecal Occult Blood Test (FOBT)/Fecal Immunochemical Test (FIT)  * Fecal DNA/Cologuard Test  * Flexible Sigmoidoscopy Age: 45-75 years old   Colonoscopy: every 10 years (may be performed more frequently if at higher risk)  OR  FOBT/FIT: every 1 year  OR  Cologuard: every 3 years  OR  Sigmoidoscopy: every 5 years  Screening may be recommended earlier than age 45 if at higher risk for colorectal cancer. Also, an individualized decision between you and your healthcare provider will decide whether screening between the ages of 76-85 would be appropriate. Colonoscopy: 03/10/2022  FOBT/FIT: Not on file  Cologuard: Not on file  Sigmoidoscopy: Not on file    Screening Current     Breast Cancer Screening Age: 40+ years old  Frequency: every 1-2 years  Not required if history of left and right mastectomy Mammogram: 12/20/2023    Screening Current   Cervical Cancer Screening Between the ages of 21-29, pap smear recommended once every 3 years.   Between the ages of 30-65, can perform pap smear with HPV co-testing every 5 years.   Recommendations may differ for women with a history of total hysterectomy, cervical cancer, or abnormal pap smears in past. Pap Smear: 10/12/2023    Screening Current   Hepatitis C Screening Once for adults born between 1945 and 1965  More frequently in patients at  high risk for Hepatitis C Hep C Antibody: 09/17/2024    Screening Current   Diabetes Screening 1-2 times per year if you're at risk for diabetes or have pre-diabetes Fasting glucose: 196 mg/dL (11/14/2024)  A1C: 6.2 % (8/13/2024)  History Diabetes   Cholesterol Screening Once every 5 years if you don't have a lipid disorder. May order more often based on risk factors. Lipid panel: 09/14/2022    History Lipid Disorder  Risks and Benefits Discussed  Due for Lipid Panel     Other Preventive Screenings Covered by Medicare:  Abdominal Aortic Aneurysm (AAA) Screening: covered once if your at risk. You're considered to be at risk if you have a family history of AAA.  Lung Cancer Screening: covers low dose CT scan once per year if you meet all of the following conditions: (1) Age 55-77; (2) No signs or symptoms of lung cancer; (3) Current smoker or have quit smoking within the last 15 years; (4) You have a tobacco smoking history of at least 20 pack years (packs per day multiplied by number of years you smoked); (5) You get a written order from a healthcare provider.  Glaucoma Screening: covered annually if you're considered high risk: (1) You have diabetes OR (2) Family history of glaucoma OR (3)  aged 50 and older OR (4)  American aged 65 and older  Osteoporosis Screening: covered every 2 years if you meet one of the following conditions: (1) You're estrogen deficient and at risk for osteoporosis based off medical history and other findings; (2) Have a vertebral abnormality; (3) On glucocorticoid therapy for more than 3 months; (4) Have primary hyperparathyroidism; (5) On osteoporosis medications and need to assess response to drug therapy.   Last bone density test (DXA Scan): 09/08/2022.  HIV Screening: covered annually if you're between the age of 15-65. Also covered annually if you are younger than 15 and older than 65 with risk factors for HIV infection. For pregnant patients, it is covered up  to 3 times per pregnancy.    Immunizations:  Immunization Recommendations   Influenza Vaccine Annual influenza vaccination during flu season is recommended for all persons aged >= 6 months who do not have contraindications   Pneumococcal Vaccine   * Pneumococcal conjugate vaccine = PCV13 (Prevnar 13), PCV15 (Vaxneuvance), PCV20 (Prevnar 20)  * Pneumococcal polysaccharide vaccine = PPSV23 (Pneumovax) Adults 19-63 yo with certain risk factors or if 65+ yo  If never received any pneumonia vaccine: recommend Prevnar 20 (PCV20)  Give PCV20 if previously received 1 dose of PCV13 or PPSV23   Hepatitis B Vaccine 3 dose series if at intermediate or high risk (ex: diabetes, end stage renal disease, liver disease)   Respiratory syncytial virus (RSV) Vaccine - COVERED BY MEDICARE PART D  * RSVPreF3 (Arexvy) CDC recommends that adults 60 years of age and older may receive a single dose of RSV vaccine using shared clinical decision-making (SCDM)   Tetanus (Td) Vaccine - COST NOT COVERED BY MEDICARE PART B Following completion of primary series, a booster dose should be given every 10 years to maintain immunity against tetanus. Td may also be given as tetanus wound prophylaxis.   Tdap Vaccine - COST NOT COVERED BY MEDICARE PART B Recommended at least once for all adults. For pregnant patients, recommended with each pregnancy.   Shingles Vaccine (Shingrix) - COST NOT COVERED BY MEDICARE PART B  2 shot series recommended in those 19 years and older who have or will have weakened immune systems or those 50 years and older     Health Maintenance Due:      Topic Date Due   • Breast Cancer Screening: Mammogram  12/20/2025   • Cervical Cancer Screening  08/10/2026   • Colorectal Cancer Screening  03/09/2027   • HIV Screening  Completed   • Hepatitis C Screening  Completed     Immunizations Due:      Topic Date Due   • Pneumococcal Vaccine: Pediatrics (0 to 5 Years) and At-Risk Patients (6 to 64 Years) (2 of 2 - PCV) 01/23/2019   •  COVID-19 Vaccine (6 - 2024-25 season) 09/01/2024     Advance Directives   What are advance directives?  Advance directives are legal documents that state your wishes and plans for medical care. These plans are made ahead of time in case you lose your ability to make decisions for yourself. Advance directives can apply to any medical decision, such as the treatments you want, and if you want to donate organs.   What are the types of advance directives?  There are many types of advance directives, and each state has rules about how to use them. You may choose a combination of any of the following:  Living will:  This is a written record of the treatment you want. You can also choose which treatments you do not want, which to limit, and which to stop at a certain time. This includes surgery, medicine, IV fluid, and tube feedings.   Durable power of  for healthcare (DPAHC):  This is a written record that states who you want to make healthcare choices for you when you are unable to make them for yourself. This person, called a proxy, is usually a family member or a friend. You may choose more than 1 proxy.  Do not resuscitate (DNR) order:  A DNR order is used in case your heart stops beating or you stop breathing. It is a request not to have certain forms of treatment, such as CPR. A DNR order may be included in other types of advance directives.  Medical directive:  This covers the care that you want if you are in a coma, near death, or unable to make decisions for yourself. You can list the treatments you want for each condition. Treatment may include pain medicine, surgery, blood transfusions, dialysis, IV or tube feedings, and a ventilator (breathing machine).  Values history:  This document has questions about your views, beliefs, and how you feel and think about life. This information can help others choose the care that you would choose.  Why are advance directives important?  An advance directive helps  you control your care. Although spoken wishes may be used, it is better to have your wishes written down. Spoken wishes can be misunderstood, or not followed. Treatments may be given even if you do not want them. An advance directive may make it easier for your family to make difficult choices about your care.   Urinary Incontinence   Urinary incontinence (UI)  is when you lose control of your bladder. UI develops because your bladder cannot store or empty urine properly. The 3 most common types of UI are stress incontinence, urge incontinence, or both.  Medicines:   May be given to help strengthen your bladder control. Report any side effects of medication to your healthcare provider.  Do pelvic muscle exercises often:  Your pelvic muscles help you stop urinating. Squeeze these muscles tight for 5 seconds, then relax for 5 seconds. Gradually work up to squeezing for 10 seconds. Do 3 sets of 15 repetitions a day, or as directed. This will help strengthen your pelvic muscles and improve bladder control.  Train your bladder:  Go to the bathroom at set times, such as every 2 hours, even if you do not feel the urge to go. You can also try to hold your urine when you feel the urge to go. For example, hold your urine for 5 minutes when you feel the urge to go. As that becomes easier, hold your urine for 10 minutes.   Self-care:   Keep a UI record.  Write down how often you leak urine and how much you leak. Make a note of what you were doing when you leaked urine.  Drink liquids as directed. You may need to limit the amount of liquid you drink to help control your urine leakage. Do not drink any liquid right before you go to bed. Limit or do not have drinks that contain caffeine or alcohol.   Prevent constipation.  Eat a variety of high-fiber foods. Good examples are high-fiber cereals, beans, vegetables, and whole-grain breads. Walking is the best way to trigger your intestines to have a bowel movement.  Exercise  regularly and maintain a healthy weight.  Weight loss and exercise will decrease pressure on your bladder and help you control your leakage.   Use a catheter as directed  to help empty your bladder. A catheter is a tiny, plastic tube that is put into your bladder to drain your urine.   Go to behavior therapy as directed.  Behavior therapy may be used to help you learn to control your urge to urinate.    Underweight  Underweight is defined as having a body mass index (BMI) of less than 18.5 kg/m2   Anorexia  is a loss of appetite, decreased food intake, or both. Your appetite naturally decreases as you get older. You also get full faster than you used to. This occurs because your body needs less energy. Other body changes can also lead to a decreased appetite. Even though some appetite loss is normal, you still need to get enough calories and nutrients to keep you healthy. You can start to lose too much weight if you do not eat as much food as your body needs. Unwanted weight loss can cause health problems, or worsen health problems you already have. You can also become dehydrated if you do not drink enough liquid.  How to eat healthy and get enough nutrients:   Choose healthy foods.  Eat a variety of fruits, vegetables, whole grains, low-fat dairy foods, lean meats, and other protein foods. Limit foods high in fat, sugar, and salt. Limit or avoid alcohol as directed. Work with a dietitian to help you plan your meals if you need to follow a special diet. A dietitian can also teach you how to modify foods if you have trouble chewing or swallowing.   Snack on healthy foods between meals  if you only eat a small amount during meals. Snacks provide extra healthy nutrients and calories between meals. Examples include fruit, cheese, and whole grain crackers.   Drink liquids as directed  to avoid dehydration. Drink liquids between meals if they cause you to get full too quickly during meals. Ask how much liquid to drink  each day and which liquids are best for you.   Use herbs, spices, and flavor enhancers to add flavor to foods.  Avoid using herbs and spice blends that also contain sodium. Ask your healthcare provider or dietitian about flavor enhancers. Flavor enhancers with ham, natural ochoa, and roast beef flavors can also be sprinkled on food to add flavor.   Share meals with others as often as you can.  Eating with others may help you to eat better during meal time. Ask family members, neighbors, or friends to join you for lunch. There are also senior centers where you can meet people, and share meals with them.   Ask family and friends for help  with shopping or preparing foods. Ask for a ride to the grocery store, if needed.       © Copyright [employer] 2018 Information is for End User's use only and may not be sold, redistributed or otherwise used for commercial purposes. All illustrations and images included in CareNotes® are the copyrighted property of [employer] or [employer]

## 2024-12-10 NOTE — ASSESSMENT & PLAN NOTE
Patient that she feels patient would benefit from physical therapy as she is able to sit to stand to get to toilet and commode and pass her bowel movements.  She feels that with additional strengthening exercises by physical therapy she will be able to hold onto assistance railings and better be able to stand up.  Prescription provided.  She also states that she needs regular follow-up lab work to monitor medication side effects and illness complications.  TSH and CK ordered.  Orders:    Ambulatory Referral to Physical Therapy; Future    TSH, 3rd generation with Free T4 reflex; Future    CK; Future

## 2024-12-10 NOTE — Clinical Note
Terrie Alicea was seen and treated in our emergency department on 12/10/2024.                Diagnosis:     Terrie  .    She may return on this date:          If you have any questions or concerns, please don't hesitate to call.      Jenna Maynard MD    ______________________________           _______________          _______________  Hospital Representative                              Date                                Time

## 2024-12-10 NOTE — CONSULTS
Consultation - Surgery-General   Name: Terrie Alicea 64 y.o. female I MRN: [id number]  Unit/Bed#: ED-08 I Date of Admission: 12/10/2024   Date of Service: 12/10/2024 I Hospital Day: 0   Consults  Physician Requesting Evaluation: Delmi Bell MD   Reason for Evaluation / Principal Problem: Malfunctioning G tube/exchange G tube    Assessment & Plan  Complaint associated with gastric tube (HCC)  64-year-old female with recently had placed surgical gastrostomy tube by Dr. Jean.  The connecting port had been broken and a new tube needed to be placed.    Plan  G-tube exchanged at the bedside  KUB with enteric contrast was obtained and demonstrated good positioning of the G-tube  Patient can begin using the G-tube immediately  Patient stable for discharge from a general surgery perspective      History of Present Illness   Terrie Alicea is a 64 y.o. female who is with her caregiver and presents for exchange of G-tube.  Patient recently had G-tube placed by Dr. Jean surgically.  The patient's caregiver called Dr. Jean's office who instructed her to come into the emergency department for exchange of a new G-tube.  Exchange was performed at the bedside.  Radiographic evidence that the G-tube was in good position.  See above for assessment and plan:    Review of Systems  See above, otherwise negative    Objective :  Temp:  [97.8 °F (36.6 °C)-98.4 °F (36.9 °C)] 98.4 °F (36.9 °C)  HR:  [76-77] 76  BP: ()/(66-85) 126/85  Resp:  [16] 16  SpO2:  [96 %-97 %] 97 %  O2 Device: None (Room air)      Physical Exam    Physical Exam:  General: No acute distress, alert and oriented  Neuro: alert, oriented x3  HENT: PERRL, EOMI  CV: Well perfused, regular rate and rhythm  Lungs: Normal work of breathing, no increased respiratory effort  Abdomen: Soft, non-tender, non-distended. G tube in place. New G tube placed easily. Position confirmed radiographically.  MSK/Extremities: No edema, clubbing or cyanosis  Skin: Warm,  "dry      Lab Results: I have reviewed the following results:  No results for input(s): \"WBC\", \"HGB\", \"HCT\", \"PLT\", \"BANDSPCT\", \"SODIUM\", \"K\", \"CL\", \"CO2\", \"BUN\", \"CREATININE\", \"GLUC\", \"CAIONIZED\", \"MG\", \"PHOS\", \"AST\", \"ALT\", \"ALB\", \"TBILI\", \"DBILI\", \"ALKPHOS\", \"PTT\", \"INR\", \"HSTNI0\", \"HSTNI2\", \"BNP\", \"LACTICACID\" in the last 72 hours.      VTE Pharmacologic Prophylaxis: none  VTE Mechanical Prophylaxis: none      "

## 2024-12-10 NOTE — PROCEDURES
Gastrostomy tube exchange performed.  The previous 16 [place] gastrostomy tube was removed.  A new tube for gastrostomy tube was placed.  The new gastrostomy tube was 2 cm of skin 2-1/2 cm of the bumper.  Omnipaque contrast was injected through the tube and positioning of this tube was confirmed to be in the stomach radiographically.  Patient can use the gastrostomy tube beginning immediately.  Please reach out to general surgery with any additional questions or concerns.

## 2024-12-10 NOTE — PROGRESS NOTES
Name: Terrie Alicea      : 1960      MRN: [id number]  Encounter Provider: Jaun Batres MD  Encounter Date: 12/10/2024   [facility]  :  Assessment & Plan  History of aspiration pneumonia  Improved and back to baseline. Follow up from ED visit on  for aspiration pneumonia. Reviewed chest xray from  which showed no acute cardiopulmonary disease, no sign of infiltrate. Patient was treated for aspiration pneumonia with flagyl and doxycycline and has completed her course without issue.     Plan:  -Continue with tube feeds  - Has repeat barium swallow study previously scheduled, care giver states will call to schedule  -Antibiotics completed  - Has follow up with PCP 3/11/2025 week for follow up of chronic diagnosis  - Monitor labwork  Orders:  •  CBC and differential; Future  •  Comprehensive metabolic panel; Future    Spastic quadriplegic cerebral palsy (HCC)  Patient that she feels patient would benefit from physical therapy as she is able to sit to stand to get to toilet and commode and pass her bowel movements.  She feels that with additional strengthening exercises by physical therapy she will be able to hold onto assistance railings and better be able to stand up.  Prescription provided.  She also states that she needs regular follow-up lab work to monitor medication side effects and illness complications.  TSH and CK ordered.  Orders:  •  Ambulatory Referral to Physical Therapy; Future  •  TSH, 3rd generation with Free T4 reflex; Future  •  CK; Future    Mixed hyperlipidemia  Noted on previous labs.  Most recently stable.  Will follow-up repeat lipid panel  Orders:  •  Lipid panel; Future    Diabetes mellitus type 2, insulin dependent (HCC)  Patient has been stable on current dose of insulin.  No signs of hypo or hyperglycemia per caregiver.  Will get repeat A1c for monitoring purposes.  Lab Results   Component Value Date    HGBA1C  "6.2 (H) 08/13/2024       Orders:  •  Hemoglobin A1C; Future    Osteoporosis, unspecified osteoporosis type, unspecified pathological fracture presence  Patient has cruz osteoporosis managed with Prolia.  Scheduled for injection January 3.  Written prescription provided to caregiver to  prior to injection.  Orders:  •  denosumab (PROLIA) 60 mg/mL; Inject 1 mL (60 mg total) under the skin once for 1 dose           History of Present Illness     Patient is a 64 year old woman here today for follow-up after ED visit for aspiration pneumonitis. Caregiver is present and patient is nonverbal. Per caregiver, initial episode of aspiration happened while in the hospital and the patient experienced extreme lethargy, wet cough and temperatures that were elevated from her baseline into the 100 degree F range. At that point the care giver took patient to the ED on 11/22 for evaluation then again on 11/25 for worsening of symptoms. Patient was given flagyl and doxycycline for which she will complete her last dose of each today.  Today caregiver reports that patient has been doing significantly better since discharge. S/P Doxycycline and Flagyl. She has been afebrile, had no episodes of coughing, no lethargy, and has no apparent respiratory difficulties. She states that she is back to herself and her baseline and how she was prior to this event. Patient is currently NPO and has a feeding tube. Per caregiver, she would like to look into a follow up barium swallow study to see if patient can be transitioned back to some pureed foods by mouth.     Patient appears better than she has been in the past 6 months per caregiver. She has been speaking more and awake. She has been \"asking to eat\" per caregiver as well. She currently transfers to toilets and commode. She would like the patient to go through physical therapy to help with transfers and to help with hand dexterity.    Due for labwork. Needs prolia for January.    Review " of Systems   Reason unable to perform ROS: nonverbal.   All other systems reviewed and are negative.         Objective   BP 99/66 (BP Location: Left arm, Patient Position: Sitting, Cuff Size: Standard)   Pulse 77   Temp 97.8 °F (36.6 °C) (Temporal)   Resp 16   LMP 11/29/2009 (Exact Date)   SpO2 96%      Physical Exam  Vitals reviewed.   Constitutional:       General: She is not in acute distress.     Appearance: She is not ill-appearing.   HENT:      Head: Normocephalic and atraumatic.   Cardiovascular:      Rate and Rhythm: Normal rate and regular rhythm.      Pulses: Normal pulses.      Heart sounds: Normal heart sounds. No murmur heard.  Pulmonary:      Effort: Pulmonary effort is normal. No respiratory distress.      Breath sounds: Normal breath sounds. No stridor. No wheezing or rhonchi.   Abdominal:      General: Abdomen is flat. Bowel sounds are normal.      Palpations: Abdomen is soft.   Musculoskeletal:      Right lower leg: No edema.      Left lower leg: No edema.   Skin:     General: Skin is warm and dry.   Neurological:      Mental Status: She is alert. Mental status is at baseline.

## 2024-12-10 NOTE — ASSESSMENT & PLAN NOTE
Patient has cruz osteoporosis managed with Prolia.  Scheduled for injection January 3.  Written prescription provided to caregiver to  prior to injection.  Orders:    denosumab (PROLIA) 60 mg/mL; Inject 1 mL (60 mg total) under the skin once for 1 dose

## 2024-12-10 NOTE — ASSESSMENT & PLAN NOTE
64-year-old female with recently had placed surgical gastrostomy tube by Dr. Jean.  The connecting port had been broken and a new tube needed to be placed.    Plan  G-tube exchanged at the bedside  KUB with enteric contrast was obtained and demonstrated good positioning of the G-tube  Patient can begin using the G-tube immediately  Patient stable for discharge from a general surgery perspective

## 2024-12-10 NOTE — ED PROVIDER NOTES
Time reflects when diagnosis was documented in both MDM as applicable and the Disposition within this note       Time User Action Codes Description Comment    12/10/2024  5:03 PM Jenna Maynard Add [K94.23] Gastrostomy tube dysfunction (HCC)     12/10/2024  5:34 PM Jenna Maynard Add [Z46.59] Encounter for care related to feeding tube     12/10/2024  5:34 PM Jenna Maynard Modify [K94.23] Gastrostomy tube dysfunction (HCC)     12/10/2024  5:34 PM Jenna Maynard Modify [Z46.59] Encounter for care related to feeding tube           ED Disposition       ED Disposition   Discharge    Condition   Stable    Date/Time   Tue Dec 10, 2024  5:34 PM    Comment   Terrie Alicea discharge to home/self care.                   Assessment & Plan       Medical Decision Making  See ED course for MDM.      ED Course as of 12/10/24 1737   Tue Dec 10, 2024   1703 DDx includes but not limited to:  g-tube dysfunction, tube dislodgement   1731 Reached out to surgery, per request of caregiver who states she spoke with Dr. Jean and was specifically instructed to ask for surgery eval.   1732 Surgery resident evaluated patient at bedside, swap to G-tube, and KUB performed.   1732 Per surgery okay to discharge. KUB shows contrast in the appropriate place after tube replacement.   1733 Discussed results with patient and plan for discharge with outpatient follow up. Instructed caregiver to continue use and to return to ED for new or worsening symptoms. Patient voices understanding and agrees with plan. No other concerns at this time.         Medications - No data to display    ED Risk Strat Scores                                               History of Present Illness       Chief Complaint   Patient presents with    Medical Problem     Care taker reports Gtube connector has broken off and now cap has also fallen off. No fevers noticed per care taker. Pt needs new G tube        Past Medical History:   Diagnosis Date    Acute metabolic encephalopathy  12/11/2015    Adjustment disorder     SCAR (acute kidney injury) ([facility]) 05/10/2024    Altered mental status 12/11/2015    Anemia     Bipolar 1 disorder ([facility])     Cerebral palsy ([facility])     Chronic hypernatremia 02/06/2016    Closed fracture of left hip ([facility]) 01/19/2016    Closed left hip fracture ([facility])     no surgery    Constipation     Dehydration 02/20/2016    Developmental delay     Diabetes mellitus ([facility])     Difficulty walking     Disease of thyroid gland     Diverticulosis     Dysphagia     Worsening    Fracture of multiple ribs of right side     Herpes zoster without complication 06/02/2021    Hip fracture ([facility]) 07/26/2015    left    Hypercalcemia 08/19/2021    Hyperlipidemia     Hypernatremia 12/28/2018    Hypotension     Impulse control disorder     Incontinence     Intellectual disability due to developmental disorder, unspecified     Microalbuminuria     Neuropathy in diabetes ([facility])     Osteopathia     Osteoporosis     Peripheral neuropathy     Pneumonia 12/28/2018    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[facility])     Thrombocytopenia ([facility]) 07/31/2015      Past Surgical History:   Procedure Laterality Date    ABDOMINAL SURGERY      COLECTOMY MIN      re-anastomosis 7/22/16    COLON SURGERY  1/31/16    COLONOSCOPY N/A 07/21/2016    Procedure: COLONOSCOPY;  Surgeon: Rosalino Jacome MD;  Location: [facility];  Service:     COLONOSCOPY N/A 01/31/2016    Procedure: COLONOSCOPY;  Surgeon: Rosalino Jacome MD;  [facility];  Service:     COLONOSCOPY N/A 07/25/2017    Procedure: COLONOSCOPY;  Surgeon: Rosalino Jacome MD;  Location: [facility];  Service: Colorectal    COLOSTOMY      EXPLORATORY LAPAROTOMY W/ BOWEL RESECTION N/A 01/31/2016    Procedure: exploratory laparotomy, left sigmoidectomy, coloproctostomy, take down splenic flexure, loop colostomy;  Surgeon: Rosalino Jacome MD;  [facility];  Service:     GASTROSTOMY TUBE PLACEMENT N/A 8/26/2024    Procedure: INSERTION GASTROSTOMY TUBE OPEN;  Surgeon:  Monroe Jean DO;  [facility];  Service: General    NE CLOSURE ENTEROSTOMY LG/SMALL INTESTINE N/A 07/22/2016    Procedure: SEGMENTAL COLECTOMY WITH COLOCOLOSTOMY;  Surgeon: Rosalino Jacome MD;  [facility];  Service: Colorectal    UPPER GASTROINTESTINAL ENDOSCOPY        Family History   Problem Relation Age of Onset    Alcohol abuse Mother     Alcohol abuse Father     Diabetes Sister     No Known Problems Sister     No Known Problems Sister       Social History     Tobacco Use    Smoking status: Never    Smokeless tobacco: Never   Vaping Use    Vaping status: Never Used   Substance Use Topics    Alcohol use: Not Currently    Drug use: No      E-Cigarette/Vaping    E-Cigarette Use Never User       E-Cigarette/Vaping Substances    Nicotine No     THC No     CBD No     Flavoring No     Other No     Unknown No       I have reviewed and agree with the history as documented.     HPI  Patient is a 64-year-old female with a history of surgical G-tube placement in August, presenting with issues regarding her G-tube per caregiver.  Caregiver states that the tube connection broke off this morning.  States she spoke with Dr. Jean, and was instructed to present to the ER and request for surgical evaluation for exchange of G-tube.  Caregiver denies any changes any issues, any fevers, change in mentation, or any other symptoms at this time.    Review of Systems   Unable to perform ROS: Patient nonverbal           Objective       ED Triage Vitals [12/10/24 1507]   Temperature Pulse Blood Pressure Respirations SpO2 Patient Position - Orthostatic VS   98.4 °F (36.9 °C) 76 126/85 16 97 % --      Temp Source Heart Rate Source BP Location FiO2 (%) Pain Score    Oral Monitor Right arm -- --      Vitals      Date and Time Temp Pulse SpO2 Resp BP Pain Score FACES Pain Rating User   12/10/24 1507 98.4 °F (36.9 °C) 76 97 % 16 126/85 -- -- ZC            Physical Exam  Vitals and nursing note reviewed.    Constitutional:       General: She is not in acute distress.     Appearance: She is ill-appearing (chronic). She is not toxic-appearing or diaphoretic.   HENT:      Head: Normocephalic and atraumatic.      Right Ear: External ear normal.      Left Ear: External ear normal.      Nose: Nose normal.      Mouth/Throat:      Mouth: Mucous membranes are moist.   Eyes:      General: No scleral icterus.     Extraocular Movements: Extraocular movements intact.      Conjunctiva/sclera: Conjunctivae normal.   Cardiovascular:      Rate and Rhythm: Normal rate and regular rhythm.      Pulses: Normal pulses.           Radial pulses are 2+ on the right side.        Dorsalis pedis pulses are 2+ on the right side and 2+ on the left side.      Heart sounds: Normal heart sounds, S1 normal and S2 normal. No murmur heard.  Pulmonary:      Effort: Pulmonary effort is normal. No respiratory distress.      Breath sounds: Normal breath sounds. No stridor. No wheezing.   Abdominal:      Palpations: Abdomen is soft.      Tenderness: There is no abdominal tenderness. There is no guarding or rebound.      Comments: G-tube to the left upper abdominal wall, no erythema or cellulitis or drainage surrounding the tube.  End of the tube is broken, with a Band-Aid covering the opening.   Musculoskeletal:         General: Normal range of motion.      Cervical back: Normal range of motion.   Skin:     General: Skin is warm and dry.   Neurological:      General: No focal deficit present.      Mental Status: She is alert and oriented to person, place, and time.   Psychiatric:         Mood and Affect: Mood normal.         Results Reviewed       None            XR abdomen 1 view kub    (Results Pending)       Procedures    ED Medication and Procedure Management   Prior to Admission Medications   Prescriptions Last Dose Informant Patient Reported? Taking?   ARIPiprazole (ABILIFY) 2 mg tablet  Care Giver Yes No   Si tablet by Per G Tube route daily at  bedtime   Alcohol Swabs 70 % PADS   No No   Sig: May substitute brand based on insurance coverage. Check glucose TID.   Ascorbic Acid (vitamin C) 1000 MG tablet   No No   Si tablet (1,000 mg total) by Per G Tube route daily   Blood Glucose Monitoring Suppl (OneTouch Verio Reflect) w/Device KIT   No No   Sig: May substitute brand based on insurance coverage. Check glucose TID.   Dextrose, Diabetic Use, 15 GM/33GM GEL   No No   Sig: Take 1 Dose by mouth daily as needed (Please give as needed for blood sugars between 61 and 80 and recheck blood sugar in 20 minutes)   Ferrous Sulfate 300 (60 Fe) mg/5 mL solution   No No   Si mL (300 mg total) by Per G Tube route every other day   Glucagon (Gvoke HypoPen 2-Pack) 0.5 MG/0.1ML SOAJ   No No   Sig: Inject 1 mg under the skin every 15 (fifteen) minutes as needed (for symptomatic blood glucose <60 and/or if patient is unconcious or seizing) Call 911 after use of pen and then notify Endocrinology provider   Gvoke HypoPen 2-Pack 1 MG/0.2ML SOAJ   Yes No   Patient not taking: Reported on 12/10/2024   Insulin Pen Needle (BD Pen Needle Yue 2nd Gen) 32G X 4 MM MISC   No No   Sig: Use to inject insulin 4 times daily   LORazepam (Ativan) 0.5 mg tablet   No No   Si tablet (0.5 mg total) by Per G Tube route daily at bedtime for 10 days Hold for sedation   NovoLIN R FlexPen 100 UNIT/ML SOPN   Yes No   Patient not taking: Reported on 12/10/2024   Refresh Liquigel 1 % GEL  Care Giver Yes No   Si (two) times a day 1 drop Bid bilaterally   Syringe, Disposable, 10 ML MISC   No No   Sig: Use 3 (three) times a day To be used with tube feeding.   Vitamins A & D (VITAMIN A & D) ointment  Care Giver Yes No   Sig: Apply topically as needed for skin irritation   baclofen 10 mg tablet   No No   Si tablet (10 mg total) by Per G Tube route 3 (three) times a day   carbidopa-levodopa (SINEMET CR)  mg TBCR per ER tablet  Care Giver No No   Sig: TAKE 2 TABLETS BY MOUTH (50/200MG)  TWICE A DAY (PARKINSONS DISEASE)   citalopram (CeleXA) 20 mg tablet   No No   Si tablet (20 mg total) by Per G Tube route daily   Patient not taking: Reported on 12/10/2024   denosumab (PROLIA) 60 mg/mL   No No   Sig: Inject 1 mL (60 mg total) under the skin once for 1 dose   escitalopram (LEXAPRO) 10 mg tablet   Yes No   glucose 40 %   No No   Sig: DISSOLVE IN WARM WATER and administer via peg tube as needed for asymptomatic for blood sugars less than 60 mg/dL and notify endocrine provider. Check blood glucose level in 15 minutes and repeat administration if blood glucose levels are less than 100. Repeat process until blood glucose level is above 100.   glucose blood (OneTouch Verio) test strip   No No   Sig: May substitute brand based on insurance coverage. Check glucose TID.   guaiFENesin (DIABETIC TUSSIN EX) 100 MG/5ML oral liquid   No No   Sig: 10 mL (200 mg total) by Per G Tube route 4 (four) times a day as needed for cough or congestion   hydrocortisone 1 % cream  Care Giver No No   Sig: Apply topically to affected area twice daily as needed for rash   insulin regular (HumuLIN R,NovoLIN R) 100 units/mL injection   No No   Sig: Inject 4 units subcutaneously at 6 PM and midnight while tube feeds are running   insulin regular (HumuLIN R,NovoLIN R) 100 units/mL injection   No No   Sig: Inject 1-5 Units under the skin every 6 (six) hours if 0 - 179 = 0; 180 - 224 = 1 unit; 225 - 299 = 2 unit; 300 - 374 = 3 unit; 375 - 449 = 4 unit; 450+ = 5 unit.  Call MD for BS less than 60 or greater than 450   lansoprazole (PREVACID SOLUTAB) 15 mg disintegrating tablet   No No   Si tablet (15 mg total) by Per G Tube route daily Please dissolve in water.   levothyroxine 25 mcg tablet   No No   Si tablet (25 mcg total) by Per G Tube route daily TAKE 1 TABLET BY MOUTH 1 HOUR BEFORE STARTING TUBE FEEDS (HYPOTHYROIDISM)   methenamine hippurate (HIPREX) 1 g tablet   No No   Sig: Crush 1 tablet two times a day per peg tube    midodrine (PROAMATINE) 5 mg tablet   No No   Si tablet (5 mg total) by Per G Tube route 3 (three) times a day for 28 days For hypotension, hold for systolic blood pressure greater than 100.   neomycin-bacitracin-polymyxin b (NEOSPORIN) ointment  Care Giver Yes No   Sig: Apply 1 application topically 2 (two) times a day as needed Apply to affected area BID PRN   olopatadine HCl (PATADAY) 0.2 % opth drops  Care Giver Yes No   Sig: Administer 1 drop to both eyes as needed Instill 1 drop into affected eyes daily PRN for eye redness   oxybutynin (DITROPAN) 5 mg tablet   No No   Si tablet (5 mg total) by Per G Tube route 3 (three) times a day   polyethylene glycol (GLYCOLAX) 17 GM/SCOOP powder   No No   Si g by Per G Tube route daily   simvastatin (ZOCOR) 20 mg tablet   No No   Sig: Daily at 4:00 PM.   traZODone (DESYREL) 100 mg tablet   No No   Si tablet (100 mg total) by Per G Tube route daily at bedtime   valproic acid (DEPAKENE) 250 MG/5ML soln   No No   Si.5 mL (125 mg total) by Per G Tube route 2 (two) times a day      Facility-Administered Medications: None     Patient's Medications   Discharge Prescriptions    No medications on file     No discharge procedures on file.  ED SEPSIS DOCUMENTATION   Time reflects when diagnosis was documented in both MDM as applicable and the Disposition within this note       Time User Action Codes Description Comment    12/10/2024  5:03 PM Jenna Maynard Add [K94.23] Gastrostomy tube dysfunction (HCC)     12/10/2024  5:34 PM Jenna Maynard Add [Z46.59] Encounter for care related to feeding tube     12/10/2024  5:34 PM Jenna Maynard Modify [K94.23] Gastrostomy tube dysfunction (HCC)     12/10/2024  5:34 PM Jenna Maynard Modify [Z46.59] Encounter for care related to feeding tube                  Jenna Maynard MD  12/10/24 0089

## 2024-12-10 NOTE — ASSESSMENT & PLAN NOTE
Noted on previous labs.  Most recently stable.  Will follow-up repeat lipid panel  Orders:    Lipid panel; Future

## 2024-12-10 NOTE — ASSESSMENT & PLAN NOTE
Improved and back to baseline. Follow up from ED visit on 11/25 for aspiration pneumonia. Reviewed chest xray from 11/25 which showed no acute cardiopulmonary disease, no sign of infiltrate. Patient was treated for aspiration pneumonia with flagyl and doxycycline and has completed her course without issue.     Plan:  -Continue with tube feeds  - Has repeat barium swallow study previously scheduled, care giver states will call to schedule  -Antibiotics completed  - Has follow up with PCP 3/11/2025 week for follow up of chronic diagnosis  - Monitor labwork  Orders:    CBC and differential; Future    Comprehensive metabolic panel; Future

## 2024-12-10 NOTE — PROGRESS NOTES
Name: Terrie Alicea      : 1960      MRN: [id number]  Encounter Provider: Jaun Batres MD  Encounter Date: 12/10/2024   Encounter department: [facility] Plan  Medicare annual wellness visit, subsequent  Vaccine:  -flu vaccine administered  -TDaP vaccine utd  -pneumococcal utd   -VZV vaccine utd    Screening:  -depression Patient non-verbal  -no hypertension Blood Pressure: 95/66  -T2DM   Lab Results   Component Value Date    HGBA1C 6.2 (H) 2024      A1c repeat ordered  -cardiovascular 4.3%  -HIV utd  -HCV utd  -colorectal cancer utd  -osteoporosis actively being treated    Lifestyle  -BMI Body mass index is 17.77 kg/m².  -diet encouraged fruits, veg, limit carbs  -exercise: patient will participate in physical therapy     Encounter for immunization  Discussed risks and benefits of flu vaccination with caregiver vaccine administrated without issues  Orders:    influenza vaccine, recombinant, PF, 0.5 mL IM (Flublok)       Preventive health issues were discussed with patient, and age appropriate screening tests were ordered as noted in patient's After Visit Summary. Personalized health advice and appropriate referrals for health education or preventive services given if needed, as noted in patient's After Visit Summary.    History of Present Illness     Terrie Alicea 64 y.o. F also seen today for post hospitalization follow-up for aspiration pneumonia.  She reports doing well and has no other issues at this time per caregiver.           Patient Care Team:  Mirta Fernando MD as PCP - General (Family Medicine)  DO Karishma Jiang MD Patrick Joseph Brogle, MD Anthony Dippolito, MD as Endoscopist  LEATHA Siddiqi as Nurse Practitioner (Nurse Practitioner)  Shantelle Sargent MD (Endocrinology)    Review of Systems   Reason unable to perform ROS: nonverbal.   All other systems reviewed and are  negative.    Medical History Reviewed by provider this encounter:  Tobacco  Allergies  Meds  Problems  Med Hx  Surg Hx  Fam Hx       Annual Wellness Visit Questionnaire   Terrie is here for her Subsequent Wellness visit.     Health Risk Assessment:   Patient rates overall health as fair. Patient feels that their physical health rating is slightly worse. Patient is dissatisfied with their life. Eyesight was rated as same. Hearing was rated as same. Patient feels that their emotional and mental health rating is slightly worse. Patients states they are sometimes angry. Patient states they are sometimes unusually tired/fatigued. Pain experienced in the last 7 days has been none. Patient states that she has experienced weight loss or gain in last 6 months.     Fall Risk Screening:   In the past year, patient has experienced: no history of falling in past year      Urinary Incontinence Screening:   Patient has leaked urine accidently in the last six months. Wears briefs and is cared for by 24hr nursing    Home Safety:  Patient does not have trouble with stairs inside or outside of their home. Patient has working smoke alarms and has working carbon monoxide detector. Home safety hazards include: none.     Nutrition:   Current diet is Other (please comment). Tube Feeding-Glucerna 80cc/hr    Medications:   Patient is not currently taking any over-the-counter supplements. Patient is not able to manage medications. Lives in ICF/ID    Activities of Daily Living (ADLs)/Instrumental Activities of Daily Living (IADLs):   Walk and transfer into and out of bed and chair?: No  Dress and groom yourself?: No    Bathe or shower yourself?: No    Feed yourself? No  Do your laundry/housekeeping?: No  Manage your money, pay your bills and track your expenses?: No  Make your own meals?: No    Do your own shopping?: No    ADL comments: Lives in ICF/ID    Durable Medical Equipment Supp[facility]  [facility]    Previous  Hospitalizations:   Any hospitalizations or ED visits within the last 12 months?: Yes    How many hospitalizations have you had in the last year?: more than 4    Advance Care Planning:   Living will: No    Durable POA for healthcare: Yes    Advanced directive: No      PREVENTIVE SCREENINGS      Cardiovascular Screening:    General: History Lipid Disorder and Risks and Benefits Discussed    Due for: Lipid Panel      Diabetes Screening:     General: History Diabetes      Colorectal Cancer Screening:     General: Screening Current      Breast Cancer Screening:     General: Screening Current      Cervical Cancer Screening:    General: Screening Current      Osteoporosis Screening:    General: Screening Not Indicated and History Osteoporosis      Lung Cancer Screening:     General: Screening Not Indicated      Hepatitis C Screening:    General: Screening Current    Screening, Brief Intervention, and Referral to Treatment (SBIRT)    Screening  Typical number of drinks in a day: 0  Typical number of drinks in a week: 0  Interpretation: Low risk drinking behavior.    AUDIT-C Screenin) How often did you have a drink containing alcohol in the past year? never  2) How many drinks did you have on a typical day when you were drinking in the past year? 0  3) How often did you have 6 or more drinks on one occasion in the past year? never    AUDIT-C Score: 0  Interpretation: Score 0-2 (female): Negative screen for alcohol misuse    Single Item Drug Screening:  How often have you used an illegal drug (including marijuana) or a prescription medication for non-medical reasons in the past year? never    Single Item Drug Screen Score: 0  Interpretation: Negative screen for possible drug use disorder    Social Drivers of Health     Financial Resource Strain: Low Risk  (5/15/2024)    Overall Financial Resource Strain (CARDIA)     Difficulty of Paying Living Expenses: Not hard at all   Food Insecurity: No Food Insecurity (2024)     Hunger Vital Sign     Worried About Running Out of Food in the Last Year: Never true     Ran Out of Food in the Last Year: Never true   Transportation Needs: No Transportation Needs (12/6/2024)    PRAPARE - Transportation     Lack of Transportation (Medical): No     Lack of Transportation (Non-Medical): No   Housing Stability: Low Risk  (12/6/2024)    Housing Stability Vital Sign     Unable to Pay for Housing in the Last Year: No     Number of Times Moved in the Last Year: 0     Homeless in the Last Year: No   Utilities: Not At Risk (12/6/2024)    [facility] Utilities     Threatened with loss of utilities: No     No results found.    Objective   BP 95/66 (BP Location: Left arm, Patient Position: Sitting, Cuff Size: Standard)   Pulse 77   Temp 97.8 °F (36.6 °C) (Temporal)   Resp 16   Wt 39.9 kg (88 lb)   LMP 11/29/2009 (Exact Date)   SpO2 96%   BMI 17.77 kg/m²     Physical Exam  Vitals reviewed.   Constitutional:       General: She is not in acute distress.     Appearance: She is not ill-appearing.   HENT:      Head: Normocephalic and atraumatic.   Cardiovascular:      Rate and Rhythm: Normal rate and regular rhythm.      Pulses: Normal pulses.      Heart sounds: Normal heart sounds. No murmur heard.  Pulmonary:      Effort: Pulmonary effort is normal. No respiratory distress.      Breath sounds: Normal breath sounds. No stridor. No wheezing or rhonchi.   Abdominal:      General: Abdomen is flat. Bowel sounds are normal.      Palpations: Abdomen is soft.      Comments: G-tube dressed and in place   Musculoskeletal:      Right lower leg: No edema.      Left lower leg: No edema.   Skin:     General: Skin is warm and dry.   Neurological:      Mental Status: She is alert. Mental status is at baseline.

## 2024-12-10 NOTE — ASSESSMENT & PLAN NOTE
Patient has been stable on current dose of insulin.  No signs of hypo or hyperglycemia per caregiver.  Will get repeat A1c for monitoring purposes.  Lab Results   Component Value Date    HGBA1C 6.2 (H) 08/13/2024       Orders:    Hemoglobin A1C; Future

## 2024-12-11 ENCOUNTER — TELEPHONE (OUTPATIENT)
Dept: FAMILY MEDICINE CLINIC | Facility: CLINIC | Age: 64
End: 2024-12-11

## 2024-12-11 DIAGNOSIS — R53.2 FUNCTIONAL QUADRIPLEGIA (HCC): ICD-10-CM

## 2024-12-11 DIAGNOSIS — G82.50 SPASTIC QUADRIPARESIS (HCC): Primary | ICD-10-CM

## 2024-12-11 NOTE — TELEPHONE ENCOUNTER
Physical therapy order needs to be adjusted so patient can received PT at home (outpatient PT called and order is not able to be used the way it was written)

## 2024-12-11 NOTE — ED ATTENDING ATTESTATION
12/10/2024  IDelmi MD, saw and evaluated the patient. I have discussed the patient with the resident/non-physician practitioner and agree with the resident's/non-physician practitioner's findings, Plan of Care, and MDM as documented in the resident's/non-physician practitioner's note, except where noted. All available labs and Radiology studies were reviewed.  I was present for key portions of any procedure(s) performed by the resident/non-physician practitioner and I was immediately available to provide assistance.       At this point I agree with the current assessment done in the Emergency Department.  I have conducted an independent evaluation of this patient a history and physical is as follows:    64-year-old presenting to the ER at surgery tendons request to have the surgery resident replace feeding tube.    No complaints per caregiver.    Feeding tube replaced by surgery resident.  Post replacement x-ray shows normal contrast in the stomach      ED Course         Critical Care Time  Procedures

## 2024-12-12 ENCOUNTER — TELEPHONE (OUTPATIENT)
Dept: FAMILY MEDICINE CLINIC | Facility: CLINIC | Age: 64
End: 2024-12-12

## 2024-12-16 ENCOUNTER — TELEPHONE (OUTPATIENT)
Dept: FAMILY MEDICINE CLINIC | Facility: CLINIC | Age: 64
End: 2024-12-16

## 2024-12-16 NOTE — ASSESSMENT & PLAN NOTE
"CT neck no organic obstructions in the mediastinum. Pt has had poor PO intake, malnutrition requiring gastrostomy tube placement on 08/26. Per group home, pt attends day program which cannot manage tube feeds so requesting her to receive it from 6pm to 6am. Settings adjusted to take this into consideration per nutrition. Nutrition's recommendations to change tube feed from Glucerna to Nepro d/t lower potassium content of the latter.     Plan:  Per Nutrition:   \"Nepro at 60ml/hr x12hrs with 125ml H2O flushes q 4hrs will provide 720ml, 1296kcal (30kcal/kg), 58g protein (1.4g/kg), 115g CHO, 1273ml H2O from TF formula and flushes (30ml/kg), ~683mg K (850mg K less than glucerna). calorie, protein and CHO content of nepro are similar glucerna 1.2.\"  Cleared by speech for pleasure feeds, HTL  Despite extensive discussion and education regarding risk of aspiration, group home charge RN would like to trial pleasure feeds of pureed and honey thick liquids in the hospital to see if pt will tolerate it as pt was previously requesting food  " SURGERY

## 2024-12-20 ENCOUNTER — APPOINTMENT (OUTPATIENT)
Dept: LAB | Facility: CLINIC | Age: 64
End: 2024-12-20
Payer: MEDICARE

## 2024-12-20 ENCOUNTER — TELEPHONE (OUTPATIENT)
Dept: FAMILY MEDICINE CLINIC | Facility: CLINIC | Age: 64
End: 2024-12-20

## 2024-12-20 DIAGNOSIS — E11.9 DIABETES MELLITUS TYPE 2, INSULIN DEPENDENT (HCC): ICD-10-CM

## 2024-12-20 DIAGNOSIS — Z79.4 DIABETES MELLITUS TYPE 2, INSULIN DEPENDENT (HCC): ICD-10-CM

## 2024-12-20 DIAGNOSIS — Z87.01 HISTORY OF ASPIRATION PNEUMONIA: ICD-10-CM

## 2024-12-20 DIAGNOSIS — E78.2 MIXED HYPERLIPIDEMIA: ICD-10-CM

## 2024-12-20 DIAGNOSIS — G80.0 SPASTIC QUADRIPLEGIC CEREBRAL PALSY (HCC): ICD-10-CM

## 2024-12-20 LAB
ALBUMIN SERPL BCG-MCNC: 4.2 G/DL (ref 3.5–5)
ALP SERPL-CCNC: 68 U/L (ref 34–104)
ALT SERPL W P-5'-P-CCNC: 23 U/L (ref 7–52)
ANION GAP SERPL CALCULATED.3IONS-SCNC: 10 MMOL/L (ref 4–13)
AST SERPL W P-5'-P-CCNC: 11 U/L (ref 13–39)
BASOPHILS # BLD AUTO: 0.08 THOUSANDS/ΜL (ref 0–0.1)
BASOPHILS NFR BLD AUTO: 1 % (ref 0–1)
BILIRUB SERPL-MCNC: 0.28 MG/DL (ref 0.2–1)
BUN SERPL-MCNC: 27 MG/DL (ref 5–25)
CALCIUM SERPL-MCNC: 11.1 MG/DL (ref 8.4–10.2)
CHLORIDE SERPL-SCNC: 98 MMOL/L (ref 96–108)
CHOLEST SERPL-MCNC: 177 MG/DL (ref ?–200)
CK SERPL-CCNC: 37 U/L (ref 26–192)
CO2 SERPL-SCNC: 35 MMOL/L (ref 21–32)
CREAT SERPL-MCNC: 0.99 MG/DL (ref 0.6–1.3)
EOSINOPHIL # BLD AUTO: 0.17 THOUSAND/ΜL (ref 0–0.61)
EOSINOPHIL NFR BLD AUTO: 2 % (ref 0–6)
ERYTHROCYTE [DISTWIDTH] IN BLOOD BY AUTOMATED COUNT: 13.2 % (ref 11.6–15.1)
EST. AVERAGE GLUCOSE BLD GHB EST-MCNC: 163 MG/DL
GFR SERPL CREATININE-BSD FRML MDRD: 61 ML/MIN/1.73SQ M
GLUCOSE SERPL-MCNC: 233 MG/DL (ref 65–140)
HBA1C MFR BLD: 7.3 %
HCT VFR BLD AUTO: 35.7 % (ref 34.8–46.1)
HDLC SERPL-MCNC: 54 MG/DL
HGB BLD-MCNC: 11.4 G/DL (ref 11.5–15.4)
IMM GRANULOCYTES # BLD AUTO: 0.14 THOUSAND/UL (ref 0–0.2)
IMM GRANULOCYTES NFR BLD AUTO: 1 % (ref 0–2)
LDLC SERPL CALC-MCNC: 98 MG/DL (ref 0–100)
LYMPHOCYTES # BLD AUTO: 1.97 THOUSANDS/ΜL (ref 0.6–4.47)
LYMPHOCYTES NFR BLD AUTO: 20 % (ref 14–44)
MCH RBC QN AUTO: 32.4 PG (ref 26.8–34.3)
MCHC RBC AUTO-ENTMCNC: 31.9 G/DL (ref 31.4–37.4)
MCV RBC AUTO: 101 FL (ref 82–98)
MONOCYTES # BLD AUTO: 0.75 THOUSAND/ΜL (ref 0.17–1.22)
MONOCYTES NFR BLD AUTO: 8 % (ref 4–12)
NEUTROPHILS # BLD AUTO: 6.92 THOUSANDS/ΜL (ref 1.85–7.62)
NEUTS SEG NFR BLD AUTO: 68 % (ref 43–75)
NRBC BLD AUTO-RTO: 0 /100 WBCS
PLATELET # BLD AUTO: 214 THOUSANDS/UL (ref 149–390)
PMV BLD AUTO: 12.5 FL (ref 8.9–12.7)
POTASSIUM SERPL-SCNC: 4.3 MMOL/L (ref 3.5–5.3)
PROT SERPL-MCNC: 8.3 G/DL (ref 6.4–8.4)
RBC # BLD AUTO: 3.52 MILLION/UL (ref 3.81–5.12)
SODIUM SERPL-SCNC: 143 MMOL/L (ref 135–147)
TRIGL SERPL-MCNC: 124 MG/DL (ref ?–150)
TSH SERPL DL<=0.05 MIU/L-ACNC: 2.76 UIU/ML (ref 0.45–4.5)
WBC # BLD AUTO: 10.03 THOUSAND/UL (ref 4.31–10.16)

## 2024-12-20 PROCEDURE — 84443 ASSAY THYROID STIM HORMONE: CPT

## 2024-12-20 PROCEDURE — 82550 ASSAY OF CK (CPK): CPT

## 2024-12-20 PROCEDURE — 80061 LIPID PANEL: CPT

## 2024-12-20 PROCEDURE — 83036 HEMOGLOBIN GLYCOSYLATED A1C: CPT

## 2024-12-20 PROCEDURE — 80053 COMPREHEN METABOLIC PANEL: CPT

## 2024-12-20 PROCEDURE — 85025 COMPLETE CBC W/AUTO DIFF WBC: CPT

## 2024-12-20 PROCEDURE — 36415 COLL VENOUS BLD VENIPUNCTURE: CPT

## 2024-12-20 NOTE — TELEPHONE ENCOUNTER
Folder (To be completed by) -Dr. Fernando     Name of Form - [employer] written order      Form to be Faxed [phone]    Patient was made aware of the 7-10 business day form policy.

## 2024-12-23 ENCOUNTER — HOSPITAL ENCOUNTER (OUTPATIENT)
Dept: RADIOLOGY | Age: 64
Discharge: HOME | End: 2024-12-23
Payer: MEDICARE

## 2024-12-23 VITALS — BODY MASS INDEX: 17.74 KG/M2 | WEIGHT: 88 LBS | HEIGHT: 59 IN

## 2024-12-23 DIAGNOSIS — Z12.31 SCREENING MAMMOGRAM FOR BREAST CANCER: ICD-10-CM

## 2024-12-23 PROCEDURE — 77067 SCR MAMMO BI INCL CAD: CPT

## 2024-12-23 PROCEDURE — 77063 BREAST TOMOSYNTHESIS BI: CPT

## 2024-12-26 ENCOUNTER — OFFICE VISIT (OUTPATIENT)
Dept: GASTROENTEROLOGY | Facility: CLINIC | Age: 64
End: 2024-12-26
Payer: MEDICARE

## 2024-12-26 VITALS — SYSTOLIC BLOOD PRESSURE: 110 MMHG | DIASTOLIC BLOOD PRESSURE: 60 MMHG | TEMPERATURE: 97.4 F

## 2024-12-26 DIAGNOSIS — Z93.1 S/P PERCUTANEOUS ENDOSCOPIC GASTROSTOMY (PEG) TUBE PLACEMENT (HCC): ICD-10-CM

## 2024-12-26 DIAGNOSIS — G80.0 SPASTIC QUADRIPLEGIC CEREBRAL PALSY (HCC): ICD-10-CM

## 2024-12-26 DIAGNOSIS — E43 SEVERE PROTEIN-CALORIE MALNUTRITION (HCC): ICD-10-CM

## 2024-12-26 DIAGNOSIS — R13.12 OROPHARYNGEAL DYSPHAGIA: Primary | ICD-10-CM

## 2024-12-26 PROBLEM — E46 PROTEIN-CALORIE MALNUTRITION, UNSPECIFIED SEVERITY (HCC): Status: RESOLVED | Noted: 2024-07-19 | Resolved: 2024-12-26

## 2024-12-26 PROCEDURE — 99214 OFFICE O/P EST MOD 30 MIN: CPT | Performed by: INTERNAL MEDICINE

## 2024-12-26 NOTE — PROGRESS NOTES
Name: Terrie Alicea      : 1960      MRN: [id number]  Encounter Provider: Cathie Barroso DO  Encounter Date: 2024   Encounter department: [facility] [place]  :  Assessment & Plan  Oropharyngeal dysphagia  Now improved with peg placement , tolerating tube feeds, nurse inquiring about changing her free water flushes which I did today.       Spastic quadriplegic cerebral palsy (HCC)         Severe protein-calorie malnutrition (HCC)  Now improved and her weight is improved from 82 to 88 lbs   She is getting glucerna                                 BMI Findings:                   S/P percutaneous endoscopic gastrostomy (PEG) tube placement (HCC)  Placed by surgeon open and was just changed in the ER, we can be available to change PEG tube as needed in the future           History of Present Illness   HPI  Terrie Alicea is a 64 y.o. female who presents for follow up.  Her nurse accompanies her to this visit.  Has gained weight after placement of surgical peg by general surgery team.  Was changed in the ER by surgery residents.      Is on continuous tube feeds now.    Is on prevacid- lansoprazole through G tube daily.      History obtained from: patient's nurse Lisa    Review of Systems       Objective   /60 (BP Location: Left arm, Patient Position: Sitting, Cuff Size: Standard)   Temp (!) 97.4 °F (36.3 °C) (Tympanic Core)   LMP 2009 (Exact Date)      Physical Exam

## 2024-12-26 NOTE — ASSESSMENT & PLAN NOTE
Now improved with peg placement , tolerating tube feeds, nurse inquiring about changing her free water flushes which I did today.

## 2024-12-26 NOTE — ASSESSMENT & PLAN NOTE
Now improved and her weight is improved from 82 to 88 lbs   She is getting glucerna                                 BMI Findings:

## 2024-12-27 ENCOUNTER — TELEPHONE (OUTPATIENT)
Dept: FAMILY MEDICINE CLINIC | Facility: CLINIC | Age: 64
End: 2024-12-27

## 2024-12-27 NOTE — TELEPHONE ENCOUNTER
Folder (To be completed by) -Dr. Fernando     Name of Form - [employer] written order        Form to be Faxed [phone]    Patient was made aware of the 7-10 business day form policy.

## 2024-12-30 ENCOUNTER — OFFICE VISIT (OUTPATIENT)
Dept: NEUROLOGY | Facility: CLINIC | Age: 64
End: 2024-12-30
Payer: MEDICARE

## 2024-12-30 VITALS
TEMPERATURE: 97.6 F | HEART RATE: 79 BPM | BODY MASS INDEX: 17.74 KG/M2 | HEIGHT: 59 IN | WEIGHT: 88 LBS | SYSTOLIC BLOOD PRESSURE: 114 MMHG | DIASTOLIC BLOOD PRESSURE: 78 MMHG | OXYGEN SATURATION: 98 %

## 2024-12-30 DIAGNOSIS — I69.398 SPASTICITY AS LATE EFFECT OF CEREBROVASCULAR ACCIDENT (CVA): ICD-10-CM

## 2024-12-30 DIAGNOSIS — G20.C PARKINSONISM, UNSPECIFIED PARKINSONISM TYPE (HCC): ICD-10-CM

## 2024-12-30 DIAGNOSIS — R25.2 SPASTICITY AS LATE EFFECT OF CEREBROVASCULAR ACCIDENT (CVA): ICD-10-CM

## 2024-12-30 DIAGNOSIS — G80.3 DYSTONIC CEREBRAL PALSY (HCC): Primary | ICD-10-CM

## 2024-12-30 PROCEDURE — 99214 OFFICE O/P EST MOD 30 MIN: CPT | Performed by: NURSE PRACTITIONER

## 2024-12-30 RX ORDER — BACLOFEN 15 MG/1
TABLET ORAL
Qty: 90 TABLET | Refills: 2 | Status: SHIPPED | OUTPATIENT
Start: 2024-12-30

## 2024-12-30 NOTE — PROGRESS NOTES
Review of Systems   Constitutional:  Negative for appetite change, fatigue and fever.   HENT: Negative.  Negative for hearing loss, tinnitus, trouble swallowing and voice change.    Eyes: Negative.  Negative for photophobia, pain and visual disturbance.   Respiratory: Negative.  Negative for shortness of breath.    Cardiovascular: Negative.  Negative for palpitations.   Gastrointestinal: Negative.  Negative for nausea and vomiting.   Endocrine: Negative.  Negative for cold intolerance.   Genitourinary: Negative.  Negative for dysuria, frequency and urgency.   Musculoskeletal:  Negative for back pain, gait problem, myalgias, neck pain and neck stiffness.        Hand spasticity/ stiff      Skin: Negative.  Negative for rash.   Allergic/Immunologic: Negative.    Neurological: Negative.  Negative for dizziness, tremors, seizures, syncope, facial asymmetry, speech difficulty, weakness, light-headedness, numbness and headaches.   Hematological: Negative.  Does not bruise/bleed easily.   Psychiatric/Behavioral: Negative.  Negative for confusion, hallucinations and sleep disturbance.    All other systems reviewed and are negative.

## 2024-12-30 NOTE — PATIENT INSTRUCTIONS
Take baclofen 10 mg in the morning, afternoon, 15 mg in the evening for 3 days then 15 mg in the morning, 10 mg in the afternoon and 15 mg in the evening for 3 days then 15 mg 3x/day.     Can consider adding on amantadine in the future to see if this helps mouth movements

## 2024-12-30 NOTE — PROGRESS NOTES
"Name: Terrie Alicea      : 1960      MRN: [id number]  Encounter Provider: LEATHA Silva  Encounter Date: 2024   Encounter department: [facility] [place]  :  Assessment & Plan  Dystonic cerebral palsy (HCC)  Patient continues to follow with PM&R, she did not noted any improvement with botox injections in several areas therefore has not continued. Continues f/u with PM&R in case any need would arise given her condition.Staff does with her new wheelchair along with wrist and hand braces her condition is manageable. She was hospitalized several times since last seen in which staff feel they have more difficulty performing ROM exercise. Unclear if this is increased spasticity or patient resisting movement. None the less will attempt to slowly increase her baclofen to 15 mg TID to see if this assists, if any side effects should return to prior dose.          Parkinsonism, unspecified Parkinsonism type (HCC)  Tremor continues to be stable on sinemet She remains on low dose abilify. She continues with movements of the mouth consistent with tardive dyskinesia, have been present for over 10 years, slightly worsened with sinemet. Had side effects with ingrezza. Discussed possible reductions of sinemet vs addition of amantadine to see if this lessens movements, may consider in the future.      f/u in 3-4 months. To contact the office sooner with any concerns or worsening symptoms.          History of Present Illness   HPI  Terrie Alicea is a 64 y.o. female with a PMH of intellectual disability 2/2 cerebral palsy, diabetes, and dysphagia presenting for follow up.        To review, she has followed with our epilepsy team since 2016 for history of seizure like episodes.  When she was seen in 10/2021  her largest concern was increased tremors, and gait changes, possible \"freezing\"Her exam did show significant spasticity. Symptoms worsening over the past few months. She was started on " a trial of sinemet due to worsening parkinsonain features, she was referred to PM&R due to spasticity.  Did see PM&R for inital consult 4/2022 in which there was a question of dystonia vs spasticity, plan was for possible botox injections.      Last office visit 6/2024 in which      Interval History:  has held off on botox with PM&R due to risks, continues to follow every 6 months for any possible needs.   Did have PEG tube placed since last visit.   Was hopsialized for sepsis/aspiration pneumonias several times.   While she was in the hospital she was not utilizing her braces and is more contractured/spastic. Unsure if she is also resisting movement.         Mouth movements have been present for 10-15 years, sinemet has somewhat increased these. Was on ingrezza however had side effects.   Remains on abilify 2 mg daily (was on higher doses in the past), was switched to lexapro, depakote was lowered.   Has been very emotional-follows with psych.           She remains on sinemet 25/100 mg CR 2 tabs BID.  Baclofen 10 mg TID   Now on midodrine for hypotension.      MRI cspine 8/2023: Multilevel cervical degenerative disc disease as detailed with diffuse disc osteophyte complexes at the C4-5 through the C6-7 levels. Superimposed central disc protrusion at the C4-5 level causing moderate central canal narrowing and focal cord   impingement. There is no cord signal abnormality to suggest myelomalacia or edema. Moderate to severe right C4-5, right C5-6, and left C6-7 neural foraminal narrowing.     MRI brain 5/2023: Study severely degraded by patient motion artifact with limited pulse sequences obtained. No mass effect or midline shift and infarct identified. Diffuse cerebral volume loss.    Review of Systems   Constitutional:  Negative for appetite change, fatigue and fever.   HENT: Negative.  Negative for hearing loss, tinnitus, trouble swallowing and voice change.    Eyes: Negative.  Negative for photophobia, pain and  "visual disturbance.   Respiratory: Negative.  Negative for shortness of breath.    Cardiovascular: Negative.  Negative for palpitations.   Gastrointestinal: Negative.  Negative for nausea and vomiting.   Endocrine: Negative.  Negative for cold intolerance.   Genitourinary: Negative.  Negative for dysuria, frequency and urgency.   Musculoskeletal:  Negative for back pain, gait problem, myalgias, neck pain and neck stiffness.        Hand spasticity/ stiff      Skin: Negative.  Negative for rash.   Allergic/Immunologic: Negative.    Neurological: Negative.  Negative for dizziness, tremors, seizures, syncope, facial asymmetry, speech difficulty, weakness, light-headedness, numbness and headaches.   Hematological: Negative.  Does not bruise/bleed easily.   Psychiatric/Behavioral: Negative.  Negative for confusion, hallucinations and sleep disturbance.    All other systems reviewed and are negative.    I have personally reviewed the MA's review of systems and made changes as necessary.         Objective   /78 (BP Location: Left arm, Patient Position: Sitting, Cuff Size: Adult)   Pulse 79   Temp 97.6 °F (36.4 °C) (Temporal)   Ht 4' 11\" (1.499 m)   Wt 39.9 kg (88 lb)   LMP 11/29/2009 (Exact Date)   SpO2 98%   BMI 17.77 kg/m²     Physical Exam  Constitutional:       General: She is awake.   Eyes:      General: Lids are normal.      Extraocular Movements: Extraocular movements intact.      Pupils: Pupils are equal, round, and reactive to light.   Neurological:      Mental Status: She is alert.      Deep Tendon Reflexes: Reflexes are normal and symmetric.       Neurological Exam  Mental Status  Awake and alert. Speech: nonverbal. Follows one-step commands.    Cranial Nerves  CN III, IV, VI: Extraocular movements intact bilaterally. Normal lids and orbits bilaterally. Pupils equal round and reactive to light bilaterally.  CN V: Facial sensation is normal.  Right: Reacts symmetrically to light touch.  Left: Reacts " symmetrically to light touch.  CN VII: Full and symmetric facial movement.  CN VIII: Hearing is normal.  CN XI: Shoulder shrug strength is normal.  CN XII: Tongue midline without atrophy or fasciculations.    Motor    Moves all extremities antigravity normal  strength b/l, intermittently follows commands so had difficulty assessing full strength but appears equal.    Sensory  Light touch is normal in upper and lower extremities.     Reflexes  Deep tendon reflexes are 2+ and symmetric in all four extremities.    Coordination    Very minimal resting tremor in left hand, no postural tremor,  Unable to perform CARLYLE but no significant bradykinesia. She did have spasticity in the UE along with toritcollis.     Flexion of b/l wrists, was able to overcome with passive ROM but had great difficuly  Spasticity vs patient resistance in the b/l UE    Constant dyskinesia of the mouth.   .    Gait    Patient presented in wheel chair, not ambulated due to safety concerns. .

## 2025-01-02 ENCOUNTER — TELEPHONE (OUTPATIENT)
Dept: FAMILY MEDICINE CLINIC | Facility: CLINIC | Age: 65
End: 2025-01-02

## 2025-01-02 ENCOUNTER — TELEPHONE (OUTPATIENT)
Dept: CARDIOLOGY CLINIC | Facility: CLINIC | Age: 65
End: 2025-01-02

## 2025-01-02 NOTE — TELEPHONE ENCOUNTER
Lisa Charge nurse for [facility] called the RX Refill Line. Message is being forwarded to the office.     Lisa is requesting Clarification on Midodrine 5 mg direction states for 28 days please clarify will be holding medication blood pressure greater then 100  not just for 28 days      Please contact Lisa at [phone]

## 2025-01-03 ENCOUNTER — HOSPITAL ENCOUNTER (OUTPATIENT)
Dept: RADIOLOGY | Facility: HOSPITAL | Age: 65
Discharge: HOME | End: 2025-01-03
Payer: MEDICARE

## 2025-01-03 DIAGNOSIS — E83.52 HYPERCALCEMIA: ICD-10-CM

## 2025-01-03 DIAGNOSIS — G80.0 SPASTIC QUADRIPLEGIC CEREBRAL PALSY (HCC): ICD-10-CM

## 2025-01-03 DIAGNOSIS — Z86.39 HISTORY OF VITAMIN D DEFICIENCY: Primary | ICD-10-CM

## 2025-01-03 PROCEDURE — 74230 X-RAY XM SWLNG FUNCJ C+: CPT

## 2025-01-03 PROCEDURE — 92611 MOTION FLUOROSCOPY/SWALLOW: CPT

## 2025-01-03 NOTE — PROCEDURES
Video Swallow Study      Patient Name: Terrie Alicea  Today's Date: 1/3/2025         Assessment Summary: Pt presented with significant oral dysphagia characterized by slow, disorganized bolus manipulation with delayed, tongue pumping bolus transfer moreso with puree than honey thick liquids. Moderate oral residue noted.  Transient penetration observed x1 w/ puree.         Recommendations:Cont PEG TF as primary means of nutrition  Ok for puree and HTL by tsp for pleasure po  Feed slowly, watch for swallows  Alternate food and liquids  Meds crushed via PEG           General Information:   65 yo female referred to [facility]  for a VBS by Dr. Brown for dysphagia, assess for aspiration risk and ability to take pleasure po.   Pt well known to this dept from previous hospitalizations and swallow studies. Baseline diet was puree w/ HTL by tsp due to known hx of silent  aspiration.   Pt was hospitalized August 2024 for sepsis and pneumonia, suspected to be aspiration related. Pt also w/ poor intake. PEG was placed 8/26/24.    Cognition:  alert, crying at times, suspect due to anxiety, turning head away but accepted po w/ encouragement.     Speech/Swallow Mech:   Oral motor movements appeared  impaired;   Dentition was  edentulous;   Cough was NA.   Respiratory Status: RA;     Current diet: NPO w/ PEG TF.      Prior VBS 11/14/23 Pt presents with moderate to severe oral-pharyngeal dysphagia characterized by impaired lip closure, bolus control and transfer, swallow initiation, soft palate elevation, hyolaryngeal elevation, and airway entrance closure. Pt with silent aspiration prior to the swallow given HTL with reduced head position in 2/2 trials. High flash penetration in 1/2 trials given HTL via teaspoon with improved head control/position. No penetration/aspiration observed with pudding or puree.     Pt was seen in radiology for a Video Barium Swallow Study, seated in the upright  position and viewed laterally with the following consistencies: puree and Honey thick liquids by tsp      Results are as follows:     **Images are available for review on PACS      Oral Stage: significantly impaired       Lip Closure:  fair  Tongue Control During Bolus Hold: decreased hold  Bolus Preparation/Mastication:  no mastication assessed  Bolus Transport/Lingual Motion: slow,delayed   Oral Residue: moderate  Velopharyngeal Closure: complete          Pharyngeal Stage: mildly impaired for limited amts and consistencies  assessed         Initiation of the Pharyngeal Swallow: delayed  Laryngeal Elevation:  adequate elevation  Anterior Hyoid Excursion: adequate anterior mvt  Epiglottic Movement/Inversion: complete inversion   Laryngeal Vestibular Closure:  adequate   Pharyngeal Stripping Wave: adequate   Pharyngeal Contraction (from AP view): NA  Pharyngoesophageal segment Opening:  adequate distension  Base of Tongue Retraction: complete retraction  Pharyngeal residue: no residue         Penetration/Aspiration:  Thin: NA  Nectar: NA  Honey: 1  Puree:  2  Solid: NA  Response to Aspiration: no aspiration observed   Strategies/Efficacy: NA    8-Point Penetration-Aspiration Scale   1 Material does not enter the airway   2 Material enters the airway, remains above the vocal folds, and is ejected  from the  airway    3 Material enters the airway, remains above the vocal folds, and is not ejected from the airway   4 Material enters the airway, contacts the vocal folds, and is ejected from the airway   5 Material enters the airway, contacts the vocal folds, and is not ejected from the airway    6 Material enters the airway, passes below the vocal folds and is ejected into the larynx or out of the airway    7 Material enters the airway, passes below the vocal folds, and is not ejected from the trachea despite effort    8 Material enters the airway, passes below the vocal folds, and no effort is made to eject              Esophageal Stage: briefly assessed. Delayed clearance through the esophagus w/ intra-esophageal retropulsion to cervical region.       Sade Hirsch MA CCC-SLP  Speech Pathologist  [place] license # [id number]  [place] license # [id number]  Available via Secure Chat

## 2025-01-06 NOTE — ASSESSMENT & PLAN NOTE
Continue PTA Depakote/Abilify   Spoke with patient's wife. She reports that patient is still having some dizziness. She tried to reach Dr. Yost's office, but they are closed for the day. She states she will call them in the morning and to schedule an appointment and discuss 
his dizziness with them. Denies any other questions. Has new medication.
PAT Pedroza

## 2025-01-07 ENCOUNTER — RESULTS FOLLOW-UP (OUTPATIENT)
Dept: FAMILY MEDICINE CLINIC | Facility: CLINIC | Age: 65
End: 2025-01-07

## 2025-01-07 NOTE — ASSESSMENT & PLAN NOTE
Patient continues to follow with PM&R, she did not noted any improvement with botox injections in several areas therefore has not continued. Continues f/u with PM&R in case any need would arise given her condition.Staff does with her new wheelchair along with wrist and hand braces her condition is manageable. She was hospitalized several times since last seen in which staff feel they have more difficulty performing ROM exercise. Unclear if this is increased spasticity or patient resisting movement. None the less will attempt to slowly increase her baclofen to 15 mg TID to see if this assists, if any side effects should return to prior dose.

## 2025-01-08 ENCOUNTER — APPOINTMENT (OUTPATIENT)
Dept: LAB | Facility: AMBULARY SURGERY CENTER | Age: 65
End: 2025-01-08
Payer: MEDICARE

## 2025-01-08 DIAGNOSIS — E83.52 HYPERCALCEMIA: ICD-10-CM

## 2025-01-08 DIAGNOSIS — Z86.39 HISTORY OF VITAMIN D DEFICIENCY: ICD-10-CM

## 2025-01-08 LAB
25(OH)D3 SERPL-MCNC: 76.1 NG/ML (ref 30–100)
CA-I BLD-SCNC: 1.3 MMOL/L (ref 1.12–1.32)
PTH-INTACT SERPL-MCNC: 7.4 PG/ML (ref 12–88)

## 2025-01-08 PROCEDURE — 83970 ASSAY OF PARATHORMONE: CPT

## 2025-01-08 PROCEDURE — 80053 COMPREHEN METABOLIC PANEL: CPT

## 2025-01-08 PROCEDURE — 82306 VITAMIN D 25 HYDROXY: CPT

## 2025-01-08 PROCEDURE — 36415 COLL VENOUS BLD VENIPUNCTURE: CPT

## 2025-01-08 PROCEDURE — 82330 ASSAY OF CALCIUM: CPT

## 2025-01-09 ENCOUNTER — RESULTS FOLLOW-UP (OUTPATIENT)
Dept: FAMILY MEDICINE CLINIC | Facility: CLINIC | Age: 65
End: 2025-01-09

## 2025-01-09 LAB
ALBUMIN SERPL BCG-MCNC: 4.1 G/DL (ref 3.5–5)
ALP SERPL-CCNC: 65 U/L (ref 34–104)
ALT SERPL W P-5'-P-CCNC: 16 U/L (ref 7–52)
ANION GAP SERPL CALCULATED.3IONS-SCNC: 9 MMOL/L (ref 4–13)
AST SERPL W P-5'-P-CCNC: 7 U/L (ref 13–39)
BILIRUB SERPL-MCNC: 0.37 MG/DL (ref 0.2–1)
BUN SERPL-MCNC: 29 MG/DL (ref 5–25)
CALCIUM SERPL-MCNC: 10.9 MG/DL (ref 8.4–10.2)
CHLORIDE SERPL-SCNC: 97 MMOL/L (ref 96–108)
CO2 SERPL-SCNC: 37 MMOL/L (ref 21–32)
CREAT SERPL-MCNC: 0.92 MG/DL (ref 0.6–1.3)
GFR SERPL CREATININE-BSD FRML MDRD: 66 ML/MIN/1.73SQ M
GLUCOSE P FAST SERPL-MCNC: 171 MG/DL (ref 65–99)
POTASSIUM SERPL-SCNC: 4.1 MMOL/L (ref 3.5–5.3)
PROT SERPL-MCNC: 7.9 G/DL (ref 6.4–8.4)
SODIUM SERPL-SCNC: 143 MMOL/L (ref 135–147)

## 2025-01-14 ENCOUNTER — HOSPITAL ENCOUNTER (OUTPATIENT)
Dept: RADIOLOGY | Age: 65
Discharge: HOME | End: 2025-01-14
Payer: MEDICARE

## 2025-01-14 VITALS — BODY MASS INDEX: 18.47 KG/M2 | WEIGHT: 88 LBS | HEIGHT: 58 IN

## 2025-01-14 DIAGNOSIS — E11.9 DIABETES MELLITUS TYPE 2, INSULIN DEPENDENT (HCC): Primary | ICD-10-CM

## 2025-01-14 DIAGNOSIS — M81.0 OSTEOPOROSIS, UNSPECIFIED OSTEOPOROSIS TYPE, UNSPECIFIED PATHOLOGICAL FRACTURE PRESENCE: ICD-10-CM

## 2025-01-14 DIAGNOSIS — Z79.4 DIABETES MELLITUS TYPE 2, INSULIN DEPENDENT (HCC): Primary | ICD-10-CM

## 2025-01-14 PROCEDURE — 77080 DXA BONE DENSITY AXIAL: CPT

## 2025-01-14 RX ORDER — KETOROLAC TROMETHAMINE 30 MG/ML
INJECTION, SOLUTION INTRAMUSCULAR; INTRAVENOUS
Qty: 1 EACH | Refills: 0 | Status: SHIPPED | OUTPATIENT
Start: 2025-01-14

## 2025-01-14 RX ORDER — HYDROCHLOROTHIAZIDE 12.5 MG/1
CAPSULE ORAL
Qty: 2 EACH | Refills: 11 | Status: SHIPPED | OUTPATIENT
Start: 2025-01-14

## 2025-01-17 ENCOUNTER — OFFICE VISIT (OUTPATIENT)
Dept: UROLOGY | Facility: CLINIC | Age: 65
End: 2025-01-17
Payer: MEDICARE

## 2025-01-17 ENCOUNTER — CLINICAL SUPPORT (OUTPATIENT)
Dept: FAMILY MEDICINE CLINIC | Facility: CLINIC | Age: 65
End: 2025-01-17

## 2025-01-17 VITALS
HEART RATE: 85 BPM | HEIGHT: 58 IN | SYSTOLIC BLOOD PRESSURE: 108 MMHG | OXYGEN SATURATION: 97 % | BODY MASS INDEX: 18.39 KG/M2 | DIASTOLIC BLOOD PRESSURE: 72 MMHG

## 2025-01-17 DIAGNOSIS — M81.0 OSTEOPOROSIS, UNSPECIFIED OSTEOPOROSIS TYPE, UNSPECIFIED PATHOLOGICAL FRACTURE PRESENCE: Primary | ICD-10-CM

## 2025-01-17 DIAGNOSIS — N39.0 RECURRENT UTI: ICD-10-CM

## 2025-01-17 LAB

## 2025-01-17 PROCEDURE — 99213 OFFICE O/P EST LOW 20 MIN: CPT

## 2025-01-17 PROCEDURE — 96372 THER/PROPH/DIAG INJ SC/IM: CPT

## 2025-01-17 RX ORDER — MULTIVIT WITH MINERALS/LUTEIN
1000 TABLET ORAL DAILY
Qty: 90 TABLET | Refills: 3 | Status: SHIPPED | OUTPATIENT
Start: 2025-01-17

## 2025-01-17 RX ORDER — METHENAMINE HIPPURATE 1000 MG/1
TABLET ORAL
Qty: 60 TABLET | Refills: 7 | Status: SHIPPED | OUTPATIENT
Start: 2025-01-17

## 2025-01-17 NOTE — PROGRESS NOTES
"1/17/2025      No chief complaint on file.        Assessment and Plan    64 y.o. female         Recurrent UTIs  Urinary Incontinence  -Continue hiprex with vitamin C for UTI prevention.  Rx refill sent to pharmacy.  -Patient has not had any recurrent urinary infections since August 2024.  She was hospitalized in August 2024 with sepsis for an E. coli UTI.  -Although patient is doing well, patient's caregiver would prefer to return in 6 months for follow-up.  -All questions addressed.  -Please do not hesitate to reach out with further questions or concerns.          History of Present Illness  Terrie Alicea is a 64 y.o. female here follow-up of recurrent UTIs. Previously a patient of Dr. Fontenot in 2016. She has a past medical history of cerebral palsy, severe intellectual disability and developmental delay, spastic quadriparesis, parkinsonism, type 2 DM, hypothyroidism, iron deficiency anemia, osteoporosis, dysphagia, sigmoid volvulus, bipolar disorder, anxiety, and urinary incontinence.      She presents with her caregiver, who has taken care of her for the last 17 years. She resides in a group home.      She was hospitalized in May 2024 with altered mental status and sepsis d/t gram-negative UTI, and SCAR.  She was also hospitalized more recently in August 2020 for for sepsis and E. coli positive UTI.  Patient previously was on 50 mg of Macrobid and at our last visit we had discontinued Macrobid and put her on Hiprex and vitamin C.      Patient did have a CT chest abdomen pelvis with contrast on 10-29-24 showing no hydronephrosis or urinary tract calculi simple bilateral renal cysts.      Review of Systems   Reason unable to perform ROS: Nonverbal.                Vitals  Vitals:    01/17/25 1235   BP: 108/72   BP Location: Left arm   Patient Position: Sitting   Cuff Size: Standard   Pulse: 85   SpO2: 97%   Height: 4' 10\" (1.473 m)       Physical Exam  Constitutional:       General: She is not in acute " distress.  HENT:      Head: Normocephalic and atraumatic.   Eyes:      Extraocular Movements: Extraocular movements intact.      Pupils: Pupils are equal, round, and reactive to light.   Pulmonary:      Effort: Pulmonary effort is normal. No respiratory distress.   Musculoskeletal:      Cervical back: Normal range of motion.   Neurological:      Mental Status: She is alert. Mental status is at baseline.      Gait: Gait abnormal.      Comments: In wheelchair           Past History  Past Medical History:   Diagnosis Date    Acute metabolic encephalopathy 12/11/2015    Adjustment disorder     SCAR (acute kidney injury) ([facility]) 05/10/2024    Altered mental status 12/11/2015    Anemia     Bipolar 1 disorder ([facility])     Cerebral palsy ([facility])     Chronic hypernatremia 02/06/2016    Closed fracture of left hip ([facility]) 01/19/2016    Closed left hip fracture ([facility])     no surgery    Constipation     Dehydration 02/20/2016    Developmental delay     Diabetes mellitus ([facility])     Difficulty walking     Disease of thyroid gland     Diverticulosis     Dysphagia     Worsening    Fracture of multiple ribs of right side     Gastric ulcer     Herpes zoster without complication 06/02/2021    Hip fracture ([facility]) 07/26/2015    left    Hypercalcemia 08/19/2021    Hyperlipidemia     Hypernatremia 12/28/2018    Hypotension     Impulse control disorder     Incontinence     Intellectual disability due to developmental disorder, unspecified     Microalbuminuria     Neuropathy in diabetes ([facility])     Osteopathia     Osteoporosis     Peripheral neuropathy     Pneumonia 12/28/2018    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[facility])     Thrombocytopenia ([facility]) 07/31/2015     Social History     Socioeconomic History    Marital status: Single     Spouse name: None    Number of children: None    Years of education: None    Highest education level: None   Occupational History    None   Tobacco Use    Smoking status: Never    Smokeless tobacco: Never   Vaping Use    Vaping status: Never Used    Substance and Sexual Activity    Alcohol use: Not Currently    Drug use: No    Sexual activity: Never   Other Topics Concern    None   Social History Narrative    Lives in a group home      Social Drivers of Health     Financial Resource Strain: Low Risk  (5/15/2024)    Overall Financial Resource Strain (CARDIA)     Difficulty of Paying Living Expenses: Not hard at all   Food Insecurity: No Food Insecurity (12/6/2024)    Hunger Vital Sign     Worried About Running Out of Food in the Last Year: Never true     Ran Out of Food in the Last Year: Never true   Transportation Needs: No Transportation Needs (12/6/2024)    PRAPARE - Transportation     Lack of Transportation (Medical): No     Lack of Transportation (Non-Medical): No   Physical Activity: Inactive (11/2/2023)    Exercise Vital Sign     Days of Exercise per Week: 0 days     Minutes of Exercise per Session: 0 min   Stress: No Stress Concern Present (5/20/2024)    [place] [employer] - Occupational Stress Questionnaire     Feeling of Stress : Not at all   Social Connections: Socially Isolated (11/2/2023)    Social Connection and Isolation Panel [NHANES]     Frequency of Communication with Friends and Family: Never     Frequency of Social Gatherings with Friends and Family: Never     Attends Taoist Services: Never     Active Member of Clubs or Organizations: No     Attends Club or Organization Meetings: Never     Marital Status: Never    Intimate Partner Violence: Not At Risk (5/20/2024)    Humiliation, Afraid, Rape, and Kick questionnaire     Fear of Current or Ex-Partner: No     Emotionally Abused: No     Physically Abused: No     Sexually Abused: No   Housing Stability: Low Risk  (12/6/2024)    Housing Stability Vital Sign     Unable to Pay for Housing in the Last Year: No     Number of Times Moved in the Last Year: 0     Homeless in the Last Year: No     Social History     Tobacco Use   Smoking Status Never   Smokeless Tobacco  "Never     Family History   Problem Relation Age of Onset    Alcohol abuse Mother     Alcohol abuse Father     Diabetes Sister     No Known Problems Sister     No Known Problems Sister        The following portions of the patient's history were reviewed and updated as appropriate: allergies, current medications, past medical history, past social history, past surgical history and problem list.    Results  No results found for this or any previous visit (from the past hour).]  No results found for: \"PSA\"  Lab Results   Component Value Date    GLUCOSE 113 01/31/2016    CALCIUM 10.9 (H) 01/08/2025     04/06/2016    K 4.1 01/08/2025    CO2 37 (H) 01/08/2025    CL 97 01/08/2025    BUN 29 (H) 01/08/2025    CREATININE 0.92 01/08/2025     Lab Results   Component Value Date    WBC 10.03 12/20/2024    HGB 11.4 (L) 12/20/2024    HCT 35.7 12/20/2024     (H) 12/20/2024     12/20/2024       LEATHA Carrero  "

## 2025-01-20 ENCOUNTER — TELEPHONE (OUTPATIENT)
Dept: NEUROLOGY | Facility: CLINIC | Age: 65
End: 2025-01-20

## 2025-01-20 NOTE — TELEPHONE ENCOUNTER
LVM for caregiver reminding her of patient's appt tomorrow with Dr Depadua at 1:00 PM on 1/21/25  
05:30

## 2025-01-21 ENCOUNTER — OFFICE VISIT (OUTPATIENT)
Dept: NEUROLOGY | Facility: CLINIC | Age: 65
End: 2025-01-21
Payer: MEDICARE

## 2025-01-21 VITALS
HEART RATE: 71 BPM | OXYGEN SATURATION: 99 % | SYSTOLIC BLOOD PRESSURE: 108 MMHG | TEMPERATURE: 98.3 F | DIASTOLIC BLOOD PRESSURE: 72 MMHG

## 2025-01-21 DIAGNOSIS — G82.50 SPASTIC QUADRIPARESIS (HCC): Primary | ICD-10-CM

## 2025-01-21 DIAGNOSIS — G80.3 DYSTONIC CEREBRAL PALSY (HCC): ICD-10-CM

## 2025-01-21 PROCEDURE — 99213 OFFICE O/P EST LOW 20 MIN: CPT | Performed by: PHYSICAL MEDICINE & REHABILITATION

## 2025-01-21 NOTE — ASSESSMENT & PLAN NOTE
Ms. Alicea is a 65yo F with CP - mixed with components of spasticity, dystonia. She has bipolar disorder/mood disturbance with impulse control, and with Parkinsonism and tardive dyskinesia. She is here today for an overview of her functional status after a very eventful fall.    Overall she is doing well, and closer to her baseline. Her caregivers feel her core strength has improved, and would like to work on standing exercises for stretching. They have a PT script and are working on arranging visits to the home. Her function is otherwise stable.    From a spasticity standpoint, she is doing well on Baclofen 15mg TID which allows them to provide her with hygiene care. We discussed holding on additional adjustments upward until she is stabilized on her psych meds, but did review that she could go up to 20mg TID as long as she can tolerate it without adverse effects and she derives benefit. They are happy with her current dosing. They are in agreement with holding off on toxin at this time.     She now has a G-tube, and is doing well with oral trials with pureed/HTL for pleasure. They are strict with her aspiration precautions.     Her team would like to continue to follow-up every 6 months to monitor for any worsening functional issues. That is fine.

## 2025-01-21 NOTE — PATIENT INSTRUCTIONS
Keep an eye on the ankles, as she gets to standing more - per caregiver she can get foot flat at times.   Keep an eye on palms/hygiene areas and impact of tone on those.  Hold and continue current dose of Baclofen until psych meds stabilize.  Start PT.

## 2025-01-21 NOTE — PROGRESS NOTES
Name: Terrie Alicea      : 1960      MRN: [id number]  Encounter Provider: Ashley L De Padua, MD  Encounter Date: 2025   Encounter department: [facility]  :  Assessment & Plan  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HCC)       Ms. Alicea is a 63yo F with CP - mixed with components of spasticity, dystonia. She has bipolar disorder/mood disturbance with impulse control, and with Parkinsonism and tardive dyskinesia. She is here today for an overview of her functional status after a very eventful fall.    Overall she is doing well, and closer to her baseline. Her caregivers feel her core strength has improved, and would like to work on standing exercises for stretching. They have a PT script and are working on arranging visits to the home. Her function is otherwise stable.    From a spasticity standpoint, she is doing well on Baclofen 15mg TID which allows them to provide her with hygiene care. We discussed holding on additional adjustments upward until she is stabilized on her psych meds, but did review that she could go up to 20mg TID as long as she can tolerate it without adverse effects and she derives benefit. They are happy with her current dosing. They are in agreement with holding off on toxin at this time.     She now has a G-tube, and is doing well with oral trials with pureed/HTL for pleasure. They are strict with her aspiration precautions.     Her team would like to continue to follow-up every 6 months to monitor for any worsening functional issues. That is fine.     Dystonic cerebral palsy (HCC)               History of Present Illness   Last seen 2024. Here with long term caregiver, Lisa.     Since then, she has been admitted to the hospital twice for severe sepsis (diagnosed with UTI and pneumonia. PEG had been placed and she is on tube feeds.She was admitted again in September for again aspiration pneumonia - concern for reflux related/bottom-up aspiration and  episodes of pneumonitis and wast reated with abx. In October shew as admitted again for hypercalcemia with IVF and started on calcitonin. During that hospitalization also found to have possible L thigh hematoma, but hgb remained stable and Gen Surg did not recommend surgical intervention. She went to the ER several times in November for lethargy, aspiration pneumonitis.  In December, there was g-tube dislodgment and it was swapped out in the ER with replacement confirmed. In December, Neurology also increased her Baclofen to help with stiffness.     She has been advanced to pureed/HTL by speech therapy. She's doing better overall. Still with dystonic movements, function remains stable.     She is doing well with the increased dose of Baclofen, much easier to range her extremities for care. eGFR 66.     From a behavior standpoint, they noticed that as the patient started to feel better and recover from her hosiptalizations (during which some of her psych meds were decreased), they noticed increased behaviors. Subsequently, There have been some adjustments to her psych meds with marked improvement. This is being done in concert with her psychiatrist.     Has an order for physical therapy, and they are working on getting that arranged to continue to work on standing.     They have noticed her vision has seemed to be worse, they are speaking to an ophthalmologist about her cataracts. She may get surgery.     Dependent for care/function  Up to 2 person assist  Primary wheelchair mobility, dependent for propulsion.  Has been doing some stands with staff.  In the past attempted stand/pivots.  Engaged, enjoys coloring.  Verbalizes minimally with staff.  Uses the carrots to hold in palm for access and resting hand splints.     Review of Systems I have personally reviewed the MA's review of systems and made changes as necessary.         Objective   /72 (BP Location: Left arm, Patient Position: Sitting, Cuff Size:  Standard)   Pulse 71   Temp 98.3 °F (36.8 °C) (Temporal)   LMP 11/29/2009 (Exact Date)   SpO2 99%     Gen: No acute distress, global developmental delay.   HEENT: Moist mucus membranes, continues with oral dyskinetic/tardive dyskinesia movements.   Cardiovascular:  Distal pulses palpable  Heme/Extr: No edema  Pulmonary: Non-labored breathing. Lungs CTAB  : No boateng  GI: Soft, non-tender, non-distended. BS+. + G-tube in place.   Integumentary: Skin is warm, dry.   Neuro: Awake, alert, non-verbal. She has dystonic/dyskinetic movements in all 4 extremities, tightness in elbows, fist, and ankles. Able to access her palm nails, elbow, and marin region for care.    Radiology Results Review: I have reviewed radiology reports from [facility] including: CT head and xray(s).    9/26/2024 CTH:  No acute intracranial abnormality. Cerebral and cerebellar atrophy again demonstrated, greater than expected for the patient's age.     10/19/2024 Renal US:    Bilateral small echogenic kidneys of medical renal disease.  Possible debris in the bladder. Correlate with urinalysis.    10/24 CT CAP:  Multilobar pneumonia.     No acute findings in the abdomen or pelvis.     Prominent rectal vault stool without bowel obstruction.    10/29 CT CAP:  . Decreased left greater than right bibasilar opacities, likely representing combination of atelectasis and pneumonia. Near complete resolution of left upper lobe opacity with minimal residual groundglass opacity, likely resolving   infection/inflammation.     2. Thickening of the left proximal posterior thigh muscle with ill-defined linear hyperenhancement which is likely infectious/inflammatory and could represent phlegmonous change. Myositis is considered. Overlying subcutaneous soft tissue edema and skin   thickening. Correlate with physical exam. Consider short interval follow-up contrast-enhanced CT to exclude developing abscess.     3. Small nodular rim-enhancing focus in the lower  uterus/cervix measuring 1.1 cm is nonspecific, possibly a small fibroid. Consider nonemergent pelvic ultrasound for further evaluation.    10/28 LE Duplex:  Impression:  RIGHT LOWER LIMB:  No gross evidence of acute or chronic deep vein thrombosis.  Doppler evaluation shows a normal response to augmentation maneuvers.  Popliteal, posterior tibial and anterior tibial arterial Doppler waveforms are  triphasic.     LEFT LOWER LIMB:  No gross evidence of acute or chronic deep vein thrombosis.  Doppler evaluation shows a normal response to augmentation maneuvers.  Popliteal, posterior tibial and anterior tibial arterial Doppler waveforms are  triphasic.    10/31 LE Duplex:  Impression  RIGHT UPPER LIMB:  No evidence of acute or chronic deep vein thrombosis.  No evidence of superficial thrombophlebitis noted.  Doppler evaluation shows a normal response to augmentation maneuvers.     LEFT UPPER LIMB:  No evidence of acute or chronic deep vein thrombosis.  No evidence of superficial thrombophlebitis noted.  Doppler evaluation shows a normal response to augmentation maneuvers.    11/1 CT LE:  Region of ill-defined hypodensity within the left gluteus young muscle with peripheral slight increased attenuation. Overall the degree of hyperdensity has further decreased with increased hypodensity more centrally. Given the interval changes, this   may reflect an evolving intramuscular hematoma. Superimposed developing infection however cannot be fully excluded.     Fecal impaction.    1/3 VBS:  Pt presented with significant oral dysphagia characterized by slow, disorganized bolus manipulation with delayed, tongue pumping bolus transfer moreso with puree than honey thick liquids. Moderate oral residue noted.  Transient penetration observed x1 w/ puree.    Recommendations:Cont PEG TF as primary means of nutrition  Ok for puree and HTL by tsp for pleasure po  Feed slowly, watch for swallows  Alternate food and liquids  Meds crushed  via PEG        Administrative Statements   I have spent a total time of 25 minutes in caring for this patient on the day of the visit/encounter including Risks and benefits of tx options, Instructions for management, Patient and family education, Impressions, Documenting in the medical record, Reviewing / ordering tests, medicine, procedures  , and Obtaining or reviewing history  .

## 2025-01-21 NOTE — PROGRESS NOTES
Review of Systems   Constitutional: Negative.    HENT: Negative.     Eyes: Negative.    Respiratory: Negative.     Cardiovascular: Negative.    Gastrointestinal: Negative.    Endocrine: Negative.    Genitourinary: Negative.    Musculoskeletal: Negative.    Allergic/Immunologic: Negative.    Neurological: Negative.    Hematological: Negative.    Psychiatric/Behavioral: Negative.      I have personally reviewed the MA's review of systems and made changes as necessary.

## 2025-01-23 RX ORDER — BACLOFEN 10 MG/1
10 TABLET ORAL
COMMUNITY
Start: 2025-01-20

## 2025-01-24 ENCOUNTER — OFFICE VISIT (OUTPATIENT)
Dept: FAMILY MEDICINE CLINIC | Facility: CLINIC | Age: 65
End: 2025-01-24

## 2025-01-24 ENCOUNTER — OFFICE VISIT (OUTPATIENT)
Dept: ENDOCRINOLOGY | Facility: CLINIC | Age: 65
End: 2025-01-24
Payer: MEDICARE

## 2025-01-24 VITALS
DIASTOLIC BLOOD PRESSURE: 74 MMHG | SYSTOLIC BLOOD PRESSURE: 108 MMHG | RESPIRATION RATE: 16 BRPM | TEMPERATURE: 98.3 F | OXYGEN SATURATION: 96 %

## 2025-01-24 VITALS
SYSTOLIC BLOOD PRESSURE: 108 MMHG | OXYGEN SATURATION: 98 % | BODY MASS INDEX: 18.39 KG/M2 | HEIGHT: 58 IN | DIASTOLIC BLOOD PRESSURE: 74 MMHG | HEART RATE: 77 BPM

## 2025-01-24 DIAGNOSIS — E11.9 DIABETES MELLITUS TYPE 2, INSULIN DEPENDENT (HCC): Primary | ICD-10-CM

## 2025-01-24 DIAGNOSIS — G82.50 SPASTIC QUADRIPARESIS (HCC): Primary | ICD-10-CM

## 2025-01-24 DIAGNOSIS — M81.0 OSTEOPOROSIS, UNSPECIFIED OSTEOPOROSIS TYPE, UNSPECIFIED PATHOLOGICAL FRACTURE PRESENCE: ICD-10-CM

## 2025-01-24 DIAGNOSIS — Z79.4 DIABETES MELLITUS TYPE 2, INSULIN DEPENDENT (HCC): Primary | ICD-10-CM

## 2025-01-24 DIAGNOSIS — E03.9 ACQUIRED HYPOTHYROIDISM: ICD-10-CM

## 2025-01-24 PROCEDURE — 99213 OFFICE O/P EST LOW 20 MIN: CPT | Performed by: FAMILY MEDICINE

## 2025-01-24 PROCEDURE — G2211 COMPLEX E/M VISIT ADD ON: HCPCS | Performed by: FAMILY MEDICINE

## 2025-01-24 PROCEDURE — 99214 OFFICE O/P EST MOD 30 MIN: CPT | Performed by: INTERNAL MEDICINE

## 2025-01-24 NOTE — ASSESSMENT & PLAN NOTE
Lab Results   Component Value Date    HGBA1C 7.3 (H) 12/20/2024     Her glycemic control is improving overall but review of her glucose log shows hyperglycemia at 12p and 6p  Continue regular insulin 4 units at 6p and 12a and start 2 units at 6p to lessen the hyperglycemia burden during the day  Provided a referral to diabetes education so her facility staff can get trained on Eduardo 3. Once they have completed this training, she can begin using this.  In the meantime, continue checking blood glucose every 6 hours and her nurse was advised to send this office a log sheet in 4 weeks for review and to additionally contact this office if her blood glucose is consistently <70 mg/dL or >250 mg/dL for >2 days at a time.  Have lab work done for A1c, BMP, and urine alb/cr ratio done in mid-March  [facility] in 6 months for a follow up visit

## 2025-01-24 NOTE — ASSESSMENT & PLAN NOTE
Patient on Prolia injections for osteoporosis, CMP and ionized calcium ordered  Orders:    Comprehensive metabolic panel; Future    Calcium, ionized; Future

## 2025-01-24 NOTE — PROGRESS NOTES
Terrie Alicea  64 y.o.  Female MRN: [id number]  Encounter: [id number]     Established Patient Progress Note      CC: Type 2 diabetes mellitus    ASSESSMENT AND PLAN  Assessment:   Terrie Alicea is a 64 y.o. female with type 2 diabetes mellitus with long-term insulin use on tube feeds, hypothyroidism, and hyperlipidemia.     Plan:  1. Diabetes mellitus type 2, insulin dependent (HCC)  Assessment & Plan:    Lab Results   Component Value Date    HGBA1C 7.3 (H) 12/20/2024     Her glycemic control is improving overall but review of her glucose log shows hyperglycemia at 12p and 6p  Continue regular insulin 4 units at 6p and 12a and start 2 units at 6p to lessen the hyperglycemia burden during the day  Provided a referral to diabetes education so her facility staff can get trained on Edurado 3. Once they have completed this training, she can begin using this.  In the meantime, continue checking blood glucose every 6 hours and her nurse was advised to send this office a log sheet in 4 weeks for review and to additionally contact this office if her blood glucose is consistently <70 mg/dL or >250 mg/dL for >2 days at a time.  Have lab work done for A1c, BMP, and urine alb/cr ratio done in mid-March  [facility] in 6 months for a follow up visit   Orders:  -     insulin regular (HumuLIN R,NovoLIN R) 100 units/mL injection; Inject 4 units subcutaneously at 6 PM and midnight while tube feeds are running and 2 units at 6 AM  -     insulin regular (HumuLIN R,NovoLIN R) 100 units/mL injection; Inject 1-5 Units under the skin every 6 (six) hours if 0 - 179 = 0; 180 - 224 = 1 unit; 225 - 299 = 2 unit; 300 - 374 = 3 unit; 375 - 449 = 4 unit; 450+ = 5 unit.  Call MD for BS less than 60 or greater than 450  -     Hemoglobin A1C; Future; Expected date: 03/20/2025  -     Basic metabolic panel; Future; Expected date: 03/20/2025  -     Ambulatory Referral to Diabetic Education; Future  -     Albumin / creatinine urine ratio; Future; Expected  date: 03/20/2025  2. Acquired hypothyroidism  Assessment & Plan:  Continue levothyroxine per PCP       HISTORY OF PRESENT ILLNESS  HPI:  Terrie Alicea is a 64 y.o. female with a past medical history of type 2 diabetes with long-term insulin use, hypothyroidism, cerebral palsy, Parkinson's disease, hyperlipidemia, and osteoporosis who presents today for a follow up visit. She was previously seen on 10/4/24 by Miryam ZHANG. She is accompanied to this visit by her nurse who provides history.     Current medications: Regular insulin 4 units before 6 pm and 12 am, correctional scale 1:45 >180 (usually 1-2 units given at 12p and 6p)     Nurses inject in abdomen and rotate sites appropriately.     Recent hospitalizations: 12/2024  Recent steroid use: Denies   DKA/HHS: Denies     Glycemic Control   A1c: 7.3% (12/20/24)   Can give glucose if blood glucose 60s-80s   Facility-Monitored Blood Glucose      12a: 120-240        6a: 90s-200s      12p: 120s-260s        6p: 180s-280s    Hypoglycemia: Previously had overt hypoglycemia after her hospital discharge     Lifestyle    Diet: Tube feeds (Glucerna 1.2 80 cc/hr 6p-6a) with occasional 2-6 oz of pleasure feeds     Physical activity: Wheelchair-bound    Maintenance    Eye exam: 1/13/25, [facility]     Foot exam: Dr. Waters    Statin: Simvastatin 20 mg daily     ACE-I/ARB: N/A      Review of Systems   Unable to perform ROS: Patient nonverbal       Patient Active Problem List   Diagnosis    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HCC)    Hyperlipidemia    Gastroesophageal reflux disease    Cerebral palsy (HCC)    Spasticity    Ambulatory dysfunction    Bipolar disorder (HCC)    Severe Intellectual disability    Diabetes mellitus type 2, insulin dependent (HCC)    Abnormal albumin    Iron deficiency anemia    Acute metabolic encephalopathy    Anxiety    Atopic dermatitis    Chondrodermatitis nodularis helicis of left ear    Constipation    Dry eye    Gait abnormality    Hypothyroidism     Menopause    Osteoporosis    Other chronic pain    Peripheral neuropathy    Physical deconditioning    Postmenopausal vaginal bleeding    Resting tremor    Seasonal allergies    Urinary incontinence    Weight loss    Osteoarthritis of both hips    Gait disturbance    Hypernatremia    Breast asymmetry    History of fall    History of vitamin D deficiency    Dysphagia    Underweight    Eosinophilic leukocytosis    Anemia    Abnormal finding on lung imaging    Herpes zoster without complication    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[facility])    Polyneuropathy associated with underlying disease ([facility])    Parkinson's disease ([facility])    Dystonic cerebral palsy ([facility])    Cervical dystonia    Viral illness    Need for shingles vaccine    Contracture of right hand    Internal hemorrhoids    Cataract    Preop examination    Developmental delay    SIRS (systemic inflammatory response syndrome) ([facility])    Hospital discharge follow-up    Functional quadriplegia ([facility])    Severe protein-calorie malnutrition ([facility])    Mood disturbances    Mood disturbance    History of aspiration pneumonia    Hematoma    Complaint associated with gastric tube ([facility])      Past Medical History:   Diagnosis Date    Acute metabolic encephalopathy 12/11/2015    Adjustment disorder     SCAR (acute kidney injury) ([facility]) 05/10/2024    Altered mental status 12/11/2015    Anemia     Bipolar 1 disorder ([facility])     Cerebral palsy ([facility])     Chronic hypernatremia 02/06/2016    Closed fracture of left hip ([facility]) 01/19/2016    Closed left hip fracture ([facility])     no surgery    Constipation     Dehydration 02/20/2016    Developmental delay     Diabetes mellitus ([facility])     Difficulty walking     Disease of thyroid gland     Diverticulosis     Dysphagia     Worsening    Fracture of multiple ribs of right side     Gastric ulcer     Herpes zoster without complication 06/02/2021    Hip fracture ([facility]) 07/26/2015    left    Hypercalcemia 08/19/2021    Hyperlipidemia     Hypernatremia 12/28/2018     Hypotension     Impulse control disorder     Incontinence     Intellectual disability due to developmental disorder, unspecified     Microalbuminuria     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)     Osteopathia     Osteoporosis     Peripheral neuropathy     Pneumonia 12/28/2018    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HCC)     Thrombocytopenia (HCC) 07/31/2015      Past Surgical History:   Procedure Laterality Date    ABDOMINAL SURGERY      COLECTOMY MIN      re-anastomosis 7/22/16    COLON SURGERY  1/31/16    COLONOSCOPY N/A 07/21/2016    Procedure: COLONOSCOPY;  Surgeon: Rosalino Jacome MD;  Location: [facility];  Service:     COLONOSCOPY N/A 01/31/2016    Procedure: COLONOSCOPY;  Surgeon: Rosalino Jacome MD;  [facility];  Service:     COLONOSCOPY N/A 07/25/2017    Procedure: COLONOSCOPY;  Surgeon: Rosalino Jacome MD;  [facility]: [facility];  Service: Colorectal    COLOSTOMY      EXPLORATORY LAPAROTOMY W/ BOWEL RESECTION N/A 01/31/2016    Procedure: exploratory laparotomy, left sigmoidectomy, coloproctostomy, take down splenic flexure, loop colostomy;  Surgeon: Rosalino Jacome MD;  [facility];  Service:     GASTROSTOMY TUBE PLACEMENT N/A 8/26/2024    Procedure: INSERTION GASTROSTOMY TUBE OPEN;  Surgeon: Monroe Jean DO;  [facility];  Service: General    WV CLOSURE ENTEROSTOMY LG/SMALL INTESTINE N/A 07/22/2016    Procedure: SEGMENTAL COLECTOMY WITH COLOCOLOSTOMY;  Surgeon: Rosalino Jacome MD;  [facility];  Service: Colorectal    UPPER GASTROINTESTINAL ENDOSCOPY        Family History   Problem Relation Age of Onset    Alcohol abuse Mother     Alcohol abuse Father     Diabetes Sister     No Known Problems Sister     No Known Problems Sister      Social History     Tobacco Use    Smoking status: Never    Smokeless tobacco: Never   Substance Use Topics    Alcohol use: Not Currently     Allergies   Allergen Reactions    Ingrezza [Valbenazine Tosylate] Other (See Comments)     Behavioral  changes per caregiver    Valbenazine Other (See Comments)    Keflex [Cephalexin] GI Intolerance         Current Outpatient Medications:     Alcohol Swabs 70 % PADS, May substitute brand based on insurance coverage. Check glucose TID., Disp: 100 each, Rfl: 0    ARIPiprazole (ABILIFY) 2 mg tablet, 1 tablet by Per G Tube route daily at bedtime, Disp: , Rfl:     Ascorbic Acid (vitamin C) 1000 MG tablet, 1 tablet (1,000 mg total) by Per G Tube route daily, Disp: 90 tablet, Rfl: 3    baclofen 10 mg tablet, Take 10 mg by mouth, Disp: , Rfl:     Baclofen 15 MG TABS, Take 1 tab TID, Disp: 90 tablet, Rfl: 2    Blood Glucose Monitoring Suppl (OneTouch Verio Reflect) w/Device KIT, May substitute brand based on insurance coverage. Check glucose TID., Disp: 1 kit, Rfl: 0    carbidopa-levodopa (SINEMET CR)  mg TBCR per ER tablet, TAKE 2 TABLETS BY MOUTH (50/200MG) TWICE A DAY (PARKINSONS DISEASE), Disp: 112 tablet, Rfl: 10    Continuous Glucose  (FreeStyle Eduardo 3 [place]) SHAI, Use with freestyle eduardo 3+ sensor to monitor blood glucose levels continuously, Disp: 1 each, Rfl: 0    Continuous Glucose Sensor (FreeStyle Eduardo 3 Plus Sensor) MISC, Use to monitor blood glucose levels continuously., Disp: 2 each, Rfl: 11    Dextrose, Diabetic Use, 15 GM/33GM GEL, Take 1 Dose by mouth daily as needed (Please give as needed for blood sugars between 61 and 80 and recheck blood sugar in 20 minutes), Disp: 33 g, Rfl: 6    escitalopram (LEXAPRO) 10 mg tablet, Take 20 mg by mouth daily, Disp: , Rfl:     Ferrous Sulfate 300 (60 Fe) mg/5 mL solution, 5 mL (300 mg total) by Per G Tube route every other day, Disp: 38 mL, Rfl: 0    Glucagon (Gvoke HypoPen 2-Pack) 0.5 MG/0.1ML SOAJ, Inject 1 mg under the skin every 15 (fifteen) minutes as needed (for symptomatic blood glucose <60 and/or if patient is unconcious or seizing) Call 911 after use of pen and then notify Endocrinology provider, Disp: 0.2 mL, Rfl: 0    glucose 40 %, DISSOLVE  IN WARM WATER and administer via peg tube as needed for asymptomatic for blood sugars less than 60 mg/dL and notify endocrine provider. Check blood glucose level in 15 minutes and repeat administration if blood glucose levels are less than 100. Repeat process until blood glucose level is above 100., Disp: 12.5 g, Rfl: 1    glucose blood (OneTouch Verio) test strip, May substitute brand based on insurance coverage. Check glucose TID., Disp: 100 each, Rfl: 0    guaiFENesin (DIABETIC TUSSIN EX) 100 MG/5ML oral liquid, 10 mL (200 mg total) by Per G Tube route 4 (four) times a day as needed for cough or congestion, Disp: , Rfl:     hydrocortisone 1 % cream, Apply topically to affected area twice daily as needed for rash, Disp: 30 g, Rfl: 0    Insulin Pen Needle (BD Pen Needle Yue 2nd Gen) 32G X 4 MM MISC, Use to inject insulin 4 times daily, Disp: 400 each, Rfl: 3    insulin regular (HumuLIN R,NovoLIN R) 100 units/mL injection, Inject 4 units subcutaneously at 6 PM and midnight while tube feeds are running, Disp: 3 mL, Rfl: 1    insulin regular (HumuLIN R,NovoLIN R) 100 units/mL injection, Inject 1-5 Units under the skin every 6 (six) hours if 0 - 179 = 0; 180 - 224 = 1 unit; 225 - 299 = 2 unit; 300 - 374 = 3 unit; 375 - 449 = 4 unit; 450+ = 5 unit.  Call MD for BS less than 60 or greater than 450, Disp: 15 mL, Rfl: 1    lansoprazole (PREVACID SOLUTAB) 15 mg disintegrating tablet, 1 tablet (15 mg total) by Per G Tube route daily Please dissolve in water., Disp: 30 tablet, Rfl: 1    levothyroxine 25 mcg tablet, 1 tablet (25 mcg total) by Per G Tube route daily TAKE 1 TABLET BY MOUTH 1 HOUR BEFORE STARTING TUBE FEEDS (HYPOTHYROIDISM), Disp: , Rfl:     methenamine hippurate (HIPREX) 1 g tablet, Crush 1 tablet two times a day per peg tube, Disp: 60 tablet, Rfl: 7    midodrine (PROAMATINE) 5 mg tablet, 1 tablet (5 mg total) by Per G Tube route 3 (three) times a day For hypotension, hold for systolic blood pressure greater  "than 100., Disp: 90 tablet, Rfl: 3    neomycin-bacitracin-polymyxin b (NEOSPORIN) ointment, Apply 1 application topically 2 (two) times a day as needed Apply to affected area BID PRN, Disp: , Rfl:     olopatadine HCl (PATADAY) 0.2 % opth drops, Administer 1 drop to both eyes as needed Instill 1 drop into affected eyes daily PRN for eye redness, Disp: , Rfl:     oxybutynin (DITROPAN) 5 mg tablet, 1 tablet (5 mg total) by Per G Tube route 3 (three) times a day, Disp: , Rfl:     polyethylene glycol (GLYCOLAX) 17 GM/SCOOP powder, 17 g by Per G Tube route daily, Disp: 510 g, Rfl: 0    Refresh Liquigel 1 % GEL, 2 (two) times a day 1 drop Bid bilaterally, Disp: , Rfl:     simvastatin (ZOCOR) 20 mg tablet, Daily at 4:00 PM., Disp: , Rfl:     Syringe, Disposable, 10 ML MISC, Use 3 (three) times a day To be used with tube feeding., Disp: 100 each, Rfl: 5    traZODone (DESYREL) 100 mg tablet, 1 tablet (100 mg total) by Per G Tube route daily at bedtime, Disp: , Rfl:     valproic acid (DEPAKENE) 250 MG/5ML soln, 2.5 mL (125 mg total) by Per G Tube route 2 (two) times a day, Disp: 473 mL, Rfl: 1    Vitamins A & D (VITAMIN A & D) ointment, Apply topically as needed for skin irritation, Disp: , Rfl:     denosumab (PROLIA) 60 mg/mL, Inject 1 mL (60 mg total) under the skin once for 1 dose, Disp: , Rfl:     LORazepam (Ativan) 0.5 mg tablet, 1 tablet (0.5 mg total) by Per G Tube route daily at bedtime for 10 days Hold for sedation, Disp: , Rfl:       OBJECTIVE  Visit Vitals  Ht 4' 10\" (1.473 m)   LMP 11/29/2009 (Exact Date)   BMI 18.39 kg/m²   OB Status Postmenopausal   Smoking Status Never   BSA 1.28 m²       Physical Exam  Constitutional:       General: She is not in acute distress.     Appearance: Normal appearance.   HENT:      Head: Normocephalic and atraumatic.   Eyes:      Extraocular Movements: Extraocular movements intact.      Pupils: Pupils are equal, round, and reactive to light.   Cardiovascular:      Rate and Rhythm: " Normal rate and regular rhythm.   Pulmonary:      Effort: Pulmonary effort is normal.      Breath sounds: Normal breath sounds.   Abdominal:      Palpations: Abdomen is soft.      Tenderness: There is no abdominal tenderness.   Musculoskeletal:      Cervical back: Neck supple.      Right lower leg: No edema.      Left lower leg: No edema.   Skin:     General: Skin is warm and dry.   Neurological:      Mental Status: She is alert. Mental status is at baseline.       Labs:    Latest Reference Range & Units 12/20/24 17:04 01/08/25 14:59   Sodium 135 - 147 mmol/L 143 143   Potassium 3.5 - 5.3 mmol/L 4.3 4.1   Chloride 96 - 108 mmol/L 98 97   Carbon Dioxide 21 - 32 mmol/L 35 (H) 37 (H)   ANION GAP 4 - 13 mmol/L 10 9   BUN 5 - 25 mg/dL 27 (H) 29 (H)   Creatinine 0.60 - 1.30 mg/dL 0.99 0.92   GLUCOSE 65 - 140 mg/dL 233 (H)    GLUCOSE, FASTING 65 - 99 mg/dL  171 (H)   Calcium 8.4 - 10.2 mg/dL 11.1 (H) 10.9 (H)   AST 13 - 39 U/L 11 (L) 7 (L)   ALT 7 - 52 U/L 23 16   ALK PHOS 34 - 104 U/L 68 65   Total Protein 6.4 - 8.4 g/dL 8.3 7.9   Albumin 3.5 - 5.0 g/dL 4.2 4.1   Total Bilirubin 0.20 - 1.00 mg/dL 0.28 0.37   GFR, Calculated ml/min/1.73sq m 61 66   Total CK 26 - 192 U/L 37    Cholesterol See Comment mg/dL 177    Triglycerides See Comment mg/dL 124    HDL >=50 mg/dL 54    LDL Calculated 0 - 100 mg/dL 98    VITAMIN D, 25-HYDROXY 30.0 - 100.0 ng/mL  76.1   WBC 4.31 - 10.16 Thousand/uL 10.03    RBC 3.81 - 5.12 Million/uL 3.52 (L)    Hemoglobin 11.5 - 15.4 g/dL 11.4 (L)    Hematocrit 34.8 - 46.1 % 35.7    MCV 82 - 98 fL 101 (H)    MCH 26.8 - 34.3 pg 32.4    MCHC 31.4 - 37.4 g/dL 31.9    RDW 11.6 - 15.1 % 13.2    Platelet Count 149 - 390 Thousands/uL 214    MPV 8.9 - 12.7 fL 12.5    nRBC /100 WBCs 0    Segmented % 43 - 75 % 68    Lymphocytes % 14 - 44 % 20    Monocytes % 4 - 12 % 8    Eosinophils % 0 - 6 % 2    Basophils % 0 - 1 % 1    Immature Grans % 0 - 2 % 1    Absolute Immature Grans 0.00 - 0.20 Thousand/uL 0.14    Absolute  Neutrophils 1.85 - 7.62 Thousands/µL 6.92    Absolute Lymphocytes 0.60 - 4.47 Thousands/µL 1.97    Absolute Monocytes 0.17 - 1.22 Thousand/µL 0.75    Absolute Eosinophils 0.00 - 0.61 Thousand/µL 0.17    Absolute Basophils 0.00 - 0.10 Thousands/µL 0.08    Hemoglobin A1C Normal 4.0-5.6%; PreDiabetic 5.7-6.4%; Diabetic >=6.5%; Glycemic control for adults with diabetes <7.0% % 7.3 (H)    eAG, EST AVG Glucose mg/dl 163    PTH 12.0 - 88.0 pg/mL  7.4 (L)   TSH 3RD GENERATON 0.450 - 4.500 uIU/mL 2.764    Calcium, Ionized 1.12 - 1.32 mmol/L  1.30   (H): Data is abnormally high  (L): Data is abnormally low      Discussed with the patient and all questioned fully answered. She will call me if any problems arise.    Counseled patient on diagnostic results, prognosis, risk and benefit of treatment options, instruction for management, importance of treatment compliance, risk factor reduction, and impressions.

## 2025-01-24 NOTE — LETTER
January 24, 2025     Patient: Terrie Alicea  YOB: 1960  Date of Visit: 1/24/2025      To Whom it May Concern:    Terrie Alicea is under my professional care. Terrie was seen in my office on 1/24/2025. Terrie is medically cleared to return to adult day program on 1/27/25 or after. Patient now has a gtube for primary means of nutrition but is medically cleared for pleasure feeds of puree and honey thick liquids.     If you have any questions or concerns, please don't hesitate to call.         Sincerely,          Floridalma Phillips MD        CC: No Recipients

## 2025-01-24 NOTE — ASSESSMENT & PLAN NOTE
Has history of spastic quadriparesis.  Currently on baclofen 20 mg daily and following with PM&R patient now has a G-tube with oral feeds for pleasure and she states this is going well.    Plan:  Continue baclofen 20 mg 3 times daily  Continue G-tube feeds  Continue pleasure feeds  Provided with letter that she may return to day program

## 2025-01-24 NOTE — PATIENT INSTRUCTIONS
Take regular insulin at the following intervals:   6 pm: 4 units  12 am: 4 units  6 am: 2 units  12 pm: correctional scale only    Send this office a log in 1 month so we can adjust her insulin doses further     Call the office if her blood sugar is <70 or >300 consistently for >2 days at a time

## 2025-01-24 NOTE — PROGRESS NOTES
Name: Terrie Alicea      : 1960      MRN: [id number]  Encounter Provider: Floridalma Phillips MD  Encounter Date: 2025   Encounter department: [facility]  :  Assessment & Plan  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HCC)  Has history of spastic quadriparesis.  Currently on baclofen 20 mg daily and following with PM&R patient now has a G-tube with oral feeds for pleasure and she states this is going well.    Plan:  Continue baclofen 20 mg 3 times daily  Continue G-tube feeds  Continue pleasure feeds  Provided with letter that she may return to day program       Osteoporosis, unspecified osteoporosis type, unspecified pathological fracture presence  Patient on Prolia injections for osteoporosis, CMP and ionized calcium ordered  Orders:    Comprehensive metabolic panel; Future    Calcium, ionized; Future           History of Present Illness   Patient is a 64-year-old female with past medical history significant for type 2 diabetes, cerebral palsy, spastic quadriparesis, bipolar disorder, mood disturbances and peripheral neuropathy presenting for letter to return to day program. Patient follows with Dr. Depadua from PM&R and baclofen was increased to 20 mg 3 times daily.  Patient has G-tube with oral feeds per pleasure.  Patient had endocrine appointment earlier today and his insulin was increased.  Patient also had Lexapro increased to 20 mg by psych due to emotional outbursts.  Per caregiver, these have been improving since increase Lexapro dosing.        Review of Systems   Reason unable to perform ROS: Patient nonverbal.       Objective   /74 (BP Location: Right arm, Patient Position: Sitting, Cuff Size: Standard)   Temp 98.3 °F (36.8 °C) (Temporal)   Resp 16   LMP 2009 (Exact Date)   SpO2 96%      Physical Exam  Constitutional:       Comments: Patient in wheelchair   HENT:      Nose: Nose normal.      Mouth/Throat:      Mouth: Mucous membranes are moist.    Cardiovascular:      Rate and Rhythm: Normal rate and regular rhythm.      Pulses: Normal pulses.      Heart sounds: Normal heart sounds.   Pulmonary:      Effort: Pulmonary effort is normal.      Breath sounds: Normal breath sounds.   Musculoskeletal:      Cervical back: Normal range of motion.   Neurological:      Mental Status: She is alert. Mental status is at baseline.      Comments: Stable upper extremity contractures           Floridamla Phillips M.D.  [facility] PGY2  1/24/2025, 5:54 PM

## 2025-01-27 ENCOUNTER — TELEPHONE (OUTPATIENT)
Age: 65
End: 2025-01-27

## 2025-01-27 NOTE — TELEPHONE ENCOUNTER
Lisa calling for the pt . Wants to speak with  Educator holly regarding appt.     Please call [phone]. Or [phone]

## 2025-01-29 ENCOUNTER — OFFICE VISIT (OUTPATIENT)
Dept: PODIATRY | Facility: CLINIC | Age: 65
End: 2025-01-29
Payer: MEDICARE

## 2025-01-29 ENCOUNTER — TELEPHONE (OUTPATIENT)
Dept: DIABETES SERVICES | Facility: CLINIC | Age: 65
End: 2025-01-29

## 2025-01-29 VITALS — BODY MASS INDEX: 18.39 KG/M2 | HEIGHT: 58 IN

## 2025-01-29 DIAGNOSIS — R60.1 GENERALIZED EDEMA: ICD-10-CM

## 2025-01-29 DIAGNOSIS — E43 SEVERE PROTEIN-CALORIE MALNUTRITION (HCC): ICD-10-CM

## 2025-01-29 DIAGNOSIS — G20.A1 PARKINSON'S DISEASE, UNSPECIFIED WHETHER DYSKINESIA PRESENT, UNSPECIFIED WHETHER MANIFESTATIONS FLUCTUATE (HCC): ICD-10-CM

## 2025-01-29 DIAGNOSIS — E11.9 DIABETES MELLITUS TYPE 2, INSULIN DEPENDENT (HCC): Primary | ICD-10-CM

## 2025-01-29 DIAGNOSIS — M79.674 PAIN IN TOES OF BOTH FEET: ICD-10-CM

## 2025-01-29 DIAGNOSIS — G63 POLYNEUROPATHY ASSOCIATED WITH UNDERLYING DISEASE (HCC): ICD-10-CM

## 2025-01-29 DIAGNOSIS — B35.1 ONYCHOMYCOSIS: ICD-10-CM

## 2025-01-29 DIAGNOSIS — M79.675 PAIN IN TOES OF BOTH FEET: ICD-10-CM

## 2025-01-29 DIAGNOSIS — Z79.4 DIABETES MELLITUS TYPE 2, INSULIN DEPENDENT (HCC): Primary | ICD-10-CM

## 2025-01-29 PROCEDURE — 11720 DEBRIDE NAIL 1-5: CPT | Performed by: PODIATRIST

## 2025-01-29 PROCEDURE — RECHECK: Performed by: PODIATRIST

## 2025-01-29 RX ORDER — FERROUS SULFATE 300 MG/5ML
300 LIQUID (ML) ORAL EVERY OTHER DAY
Qty: 38 ML | Refills: 5 | Status: SHIPPED | OUTPATIENT
Start: 2025-01-29

## 2025-01-29 NOTE — TELEPHONE ENCOUNTER
Returned phone call to charge nurse, Lisa, at [facility]. Informed her that we can accommodate a few staff members at [place] [facility], but we would not be able to train all 13 staff members. Lisa expressed understanding. Requested to be put on cancellation list if any appointment opens up sooner.

## 2025-01-29 NOTE — PROGRESS NOTES
Name: Terrie Alicea      : 1960      MRN: [id number]  Encounter Provider: Manolo Eubanks DPM  Encounter Date: 2025   Encounter department: [facility] [place]  :  Assessment & Plan  Diabetes mellitus type 2, insulin dependent (HCC)    Lab Results   Component Value Date    HGBA1C 7.3 (H) 2024            Polyneuropathy associated with underlying disease (HCC)         Onychomycosis       Debride mycotic nails and thin the nail plates x 2 with the use of a nail nipper manually and an electric Dremel bur was used to reduce the thickness of the nail beds and smoothed the distal aspect of the nails.   Parkinson's disease, unspecified whether dyskinesia present, unspecified whether manifestations fluctuate ([facility])         Generalized edema         Pain in toes of both feet         Discussed proper shoe gear, daily inspections of feet, and general foot health with patient. Patient has Q9  findings and is recommended for at risk foot care every 9-10 weeks.    Patients most recent complete clinical foot exam was on: 2025      Return in about 10 weeks (around 2025).     History of Present Illness   HPI  Terrie Alicea is a 64 y.o. female who presents with chief complaint of painful thick nails on both feet and she was seen today with her feet down in her wheelchair with staff present.Patient presents for at-risk foot care.  Patient has no acute concerns today.  Patient has significant lower extremity risk due to neuropathy, parasthesia, edema, and trophic skin changes to the lower extremity.   History obtained from: patient's Legal Guardian    Review of Systems  Medical History Reviewed by provider this encounter:     .  Current Outpatient Medications on File Prior to Visit   Medication Sig Dispense Refill    Alcohol Swabs 70 % PADS May substitute brand based on insurance coverage. Check glucose TID. 100 each 0    ARIPiprazole (ABILIFY) 2 mg tablet 1 tablet by Per G Tube route daily  at bedtime      Ascorbic Acid (vitamin C) 1000 MG tablet 1 tablet (1,000 mg total) by Per G Tube route daily 90 tablet 3    baclofen 10 mg tablet Take 10 mg by mouth      Baclofen 15 MG TABS Take 1 tab TID 90 tablet 2    Blood Glucose Monitoring Suppl (OneTouch Verio Reflect) w/Device KIT May substitute brand based on insurance coverage. Check glucose TID. 1 kit 0    carbidopa-levodopa (SINEMET CR)  mg TBCR per ER tablet TAKE 2 TABLETS BY MOUTH (50/200MG) TWICE A DAY (PARKINSONS DISEASE) 112 tablet 10    Continuous Glucose  (FreeStyle Eduardo 3 Bowie) SHAI Use with freestyle eduardo 3+ sensor to monitor blood glucose levels continuously 1 each 0    Continuous Glucose Sensor (FreeStyle Eduardo 3 Plus Sensor) MISC Use to monitor blood glucose levels continuously. 2 each 11    Dextrose, Diabetic Use, 15 GM/33GM GEL Take 1 Dose by mouth daily as needed (Please give as needed for blood sugars between 61 and 80 and recheck blood sugar in 20 minutes) 33 g 6    escitalopram (LEXAPRO) 10 mg tablet Take 20 mg by mouth daily      Ferrous Sulfate 300 (60 Fe) mg/5 mL solution 5 mL (300 mg total) by Per G Tube route every other day 38 mL 0    Glucagon (Gvoke HypoPen 2-Pack) 0.5 MG/0.1ML SOAJ Inject 1 mg under the skin every 15 (fifteen) minutes as needed (for symptomatic blood glucose <60 and/or if patient is unconcious or seizing) Call 911 after use of pen and then notify Endocrinology provider 0.2 mL 0    glucose 40 % DISSOLVE IN WARM WATER and administer via peg tube as needed for asymptomatic for blood sugars less than 60 mg/dL and notify endocrine provider. Check blood glucose level in 15 minutes and repeat administration if blood glucose levels are less than 100. Repeat process until blood glucose level is above 100. 12.5 g 1    glucose blood (OneTouch Verio) test strip May substitute brand based on insurance coverage. Check glucose TID. 100 each 0    guaiFENesin (DIABETIC TUSSIN EX) 100 MG/5ML oral liquid 10 mL  (200 mg total) by Per G Tube route 4 (four) times a day as needed for cough or congestion      hydrocortisone 1 % cream Apply topically to affected area twice daily as needed for rash 30 g 0    Insulin Pen Needle (BD Pen Needle Yue 2nd Gen) 32G X 4 MM MISC Use to inject insulin 4 times daily 400 each 3    insulin regular (HumuLIN R,NovoLIN R) 100 units/mL injection Inject 4 units subcutaneously at 6 PM and midnight while tube feeds are running and 2 units at 6 AM 10 mL 2    insulin regular (HumuLIN R,NovoLIN R) 100 units/mL injection Inject 1-5 Units under the skin every 6 (six) hours if 0 - 179 = 0; 180 - 224 = 1 unit; 225 - 299 = 2 unit; 300 - 374 = 3 unit; 375 - 449 = 4 unit; 450+ = 5 unit.  Call MD for BS less than 60 or greater than 450 10 mL 2    lansoprazole (PREVACID SOLUTAB) 15 mg disintegrating tablet 1 tablet (15 mg total) by Per G Tube route daily Please dissolve in water. 30 tablet 1    levothyroxine 25 mcg tablet 1 tablet (25 mcg total) by Per G Tube route daily TAKE 1 TABLET BY MOUTH 1 HOUR BEFORE STARTING TUBE FEEDS (HYPOTHYROIDISM)      methenamine hippurate (HIPREX) 1 g tablet Crush 1 tablet two times a day per peg tube 60 tablet 7    midodrine (PROAMATINE) 5 mg tablet 1 tablet (5 mg total) by Per G Tube route 3 (three) times a day For hypotension, hold for systolic blood pressure greater than 100. 90 tablet 3    neomycin-bacitracin-polymyxin b (NEOSPORIN) ointment Apply 1 application topically 2 (two) times a day as needed Apply to affected area BID PRN      olopatadine HCl (PATADAY) 0.2 % opth drops Administer 1 drop to both eyes as needed Instill 1 drop into affected eyes daily PRN for eye redness      oxybutynin (DITROPAN) 5 mg tablet 1 tablet (5 mg total) by Per G Tube route 3 (three) times a day      polyethylene glycol (GLYCOLAX) 17 GM/SCOOP powder 17 g by Per G Tube route daily 510 g 0    Refresh Liquigel 1 % GEL 2 (two) times a day 1 drop Bid bilaterally      simvastatin (ZOCOR) 20 mg  "tablet Daily at 4:00 PM.      Syringe, Disposable, 10 ML MISC Use 3 (three) times a day To be used with tube feeding. 100 each 5    traZODone (DESYREL) 100 mg tablet 1 tablet (100 mg total) by Per G Tube route daily at bedtime      valproic acid (DEPAKENE) 250 MG/5ML soln 2.5 mL (125 mg total) by Per G Tube route 2 (two) times a day 473 mL 1    Vitamins A & D (VITAMIN A & D) ointment Apply topically as needed for skin irritation      denosumab (PROLIA) 60 mg/mL Inject 1 mL (60 mg total) under the skin once for 1 dose      LORazepam (Ativan) 0.5 mg tablet 1 tablet (0.5 mg total) by Per G Tube route daily at bedtime for 10 days Hold for sedation       No current facility-administered medications on file prior to visit.      Social History     Tobacco Use    Smoking status: Never    Smokeless tobacco: Never   Vaping Use    Vaping status: Never Used   Substance and Sexual Activity    Alcohol use: Not Currently    Drug use: No    Sexual activity: Never        Objective   Ht 4' 10\" (1.473 m) Comment: verbal  LMP 11/29/2009 (Exact Date)   BMI 18.39 kg/m²      Physical Exam  Vascular status is a faint 1/4 DP PT negative digital hair normal distal cooling immediate capillary refill bilaterally.  Capillary refill is approximately 2 to 3 seconds.  Mild amount of edema is present bilaterally    Derm nails are brittle elongated hypertrophic white-yellow discoloration with subungual debris x 2.  There is an increased thickness and the nails are approximately 1 to 2 mm.  There is loss of turgor noted bilaterally and thinning of the skin also seen bilaterally.    Ortho dropfoot deformities are noted bilaterally and hammertoes present on the fifth digits bilaterally.    Neuro is unchanged from her visit on 11/20/2024.      "

## 2025-01-30 NOTE — ASSESSMENT & PLAN NOTE
Called the [facility] at [address] where original rx was sent.  They confirmed that the miconazole should be in stock at [facility] on [address].  They will forward script to the other [facility] and notify patient.  No need to resend script.    Encounter closed.    Continue home medication regimen:  - abilify 30mg at bedtime  - ativan 0 5mg at bedtime   - citalopram 40mg daily   - valproic acid 500mg BID   - trazodone 100mg at bedtime

## 2025-02-04 ENCOUNTER — TELEPHONE (OUTPATIENT)
Age: 65
End: 2025-02-04

## 2025-02-04 NOTE — TELEPHONE ENCOUNTER
"Behavioral Health Outpatient Intake Questions    Referred By   : PCP    Please advise interviewee that they need to answer all questions truthfully to allow for best care, and any misrepresentations of information may affect their ability to be seen at this clinic   => Was this discussed? Yes     If Minor Child (under age 18)    Who is/are the legal guardian(s) of the child? Lisa Care giver    Is there a custody agreement? No     If \"YES\"- Custody orders must be obtained prior to scheduling the first appointment  In addition, Consent to Treatment must be signed by all legal guardians prior to scheduling the first appointment    If \"NO\"- Consent to Treatment must be signed by all legal guardians prior to scheduling the first appointment    Behavioral Health Outpatient Intake History -     Presenting Problem (in patient's own words): patient was seeing a doctor who does not take her insurance at [facility]. Patient is bipolar.     Are there any communication barriers for this patient?     Yes           ID                                    If yes, please describe barriers: limited verbage  If there is a unique situation, please refer to Mikel Merida/Anita Powers for final determination.    Are you taking any psychiatric medications? Yes     If \"YES\" -What are they  abilify,ativan, lexapro,Valproic acid, Trazadone.    If \"YES\" -Who prescribes? Dr. Da Silva at [facility]    Has the Patient previously received outpatient Talk Therapy or Medication Management from [facility]  No        If \"YES\"- When, Where and with Whom?         If \"NO\" -Has Patient received these services elsewhere?       If \"YES\" -When, Where, and with Whom?    Has the Patient abused alcohol or other substances in the last 6 months ? No       If \"YES\" -What substance, How much, How often?     If illegal substance: Refer to [facility] (for YOSELIN) or [facility] Off[facility].   If Alcohol in excess of 10 drinks per week:  Refer to [facility] (for YOSELIN) or [facility] " "Offices    Legal History-     Is this treatment court ordered? No   If \"yes \"send to :  Talk Therapy : Send to Mikel Merida for final determination   Med Management: Send to Dr. Henderson for final determination     Has the Patient been convicted of a felony?  NO   If \"Yes\" send to -When, What?  Talk Therapy: Send to Mikel Merida for final determination   Med Management: Send to Dr. Henderson for final determination     ACCEPTED as a patient Yes  If \"Yes\" Appointment Date: 5/14/25 at 11am    Referred Elsewhere? No  If “Yes” - (Where? Ex: [facility], [facility], [facility], [facility], etc.)       Name of Insurance Co:[facility]   Insurance ID# [id number]  Insurance Phone # [phone]  If ins is primary or secondary?primary   If patient is a minor, parents information such as Name, D.O.B of guarantor.  NP forms sent via [employer] Call  "

## 2025-02-05 ENCOUNTER — APPOINTMENT (OUTPATIENT)
Dept: LAB | Facility: CLINIC | Age: 65
End: 2025-02-05
Payer: MEDICARE

## 2025-02-05 DIAGNOSIS — M81.0 OSTEOPOROSIS, UNSPECIFIED OSTEOPOROSIS TYPE, UNSPECIFIED PATHOLOGICAL FRACTURE PRESENCE: ICD-10-CM

## 2025-02-05 LAB
ALBUMIN SERPL BCG-MCNC: 4.3 G/DL (ref 3.5–5)
ALP SERPL-CCNC: 73 U/L (ref 34–104)
ALT SERPL W P-5'-P-CCNC: 13 U/L (ref 7–52)
ANION GAP SERPL CALCULATED.3IONS-SCNC: 11 MMOL/L (ref 4–13)
AST SERPL W P-5'-P-CCNC: 18 U/L (ref 13–39)
BILIRUB SERPL-MCNC: 0.25 MG/DL (ref 0.2–1)
BUN SERPL-MCNC: 33 MG/DL (ref 5–25)
CA-I BLD-SCNC: 1.24 MMOL/L (ref 1.12–1.32)
CALCIUM SERPL-MCNC: 10.2 MG/DL (ref 8.4–10.2)
CHLORIDE SERPL-SCNC: 100 MMOL/L (ref 96–108)
CO2 SERPL-SCNC: 30 MMOL/L (ref 21–32)
CREAT SERPL-MCNC: 0.95 MG/DL (ref 0.6–1.3)
GFR SERPL CREATININE-BSD FRML MDRD: 64 ML/MIN/1.73SQ M
GLUCOSE SERPL-MCNC: 174 MG/DL (ref 65–140)
POTASSIUM SERPL-SCNC: 5.1 MMOL/L (ref 3.5–5.3)
PROT SERPL-MCNC: 8.4 G/DL (ref 6.4–8.4)
SODIUM SERPL-SCNC: 141 MMOL/L (ref 135–147)

## 2025-02-05 PROCEDURE — 80053 COMPREHEN METABOLIC PANEL: CPT

## 2025-02-05 PROCEDURE — 36415 COLL VENOUS BLD VENIPUNCTURE: CPT

## 2025-02-05 PROCEDURE — 82330 ASSAY OF CALCIUM: CPT

## 2025-02-06 ENCOUNTER — RESULTS FOLLOW-UP (OUTPATIENT)
Dept: FAMILY MEDICINE CLINIC | Facility: CLINIC | Age: 65
End: 2025-02-06

## 2025-02-25 DIAGNOSIS — I69.398 SPASTICITY AS LATE EFFECT OF CEREBROVASCULAR ACCIDENT (CVA): ICD-10-CM

## 2025-02-25 DIAGNOSIS — R25.2 SPASTICITY AS LATE EFFECT OF CEREBROVASCULAR ACCIDENT (CVA): ICD-10-CM

## 2025-02-26 RX ORDER — BACLOFEN 10 MG/1
TABLET ORAL
Qty: 135 TABLET | Refills: 10 | Status: SHIPPED | OUTPATIENT
Start: 2025-02-26

## 2025-03-05 ENCOUNTER — TELEPHONE (OUTPATIENT)
Dept: ENDOCRINOLOGY | Facility: CLINIC | Age: 65
End: 2025-03-05

## 2025-03-05 ENCOUNTER — OFFICE VISIT (OUTPATIENT)
Dept: DIABETES SERVICES | Facility: CLINIC | Age: 65
End: 2025-03-05
Payer: MEDICARE

## 2025-03-05 DIAGNOSIS — E11.9 DIABETES MELLITUS TYPE 2, INSULIN DEPENDENT (HCC): ICD-10-CM

## 2025-03-05 DIAGNOSIS — E11.9 DIABETES MELLITUS TYPE 2, INSULIN DEPENDENT (HCC): Primary | ICD-10-CM

## 2025-03-05 DIAGNOSIS — Z79.4 DIABETES MELLITUS TYPE 2, INSULIN DEPENDENT (HCC): ICD-10-CM

## 2025-03-05 DIAGNOSIS — Z79.4 DIABETES MELLITUS TYPE 2, INSULIN DEPENDENT (HCC): Primary | ICD-10-CM

## 2025-03-05 PROCEDURE — 95249 CONT GLUC MNTR PT PROV EQP: CPT

## 2025-03-05 NOTE — PATIENT INSTRUCTIONS
Call customer service for any technical concerns with your sensor. Call your doctor for any medical concerns with your blood sugars.    [facility] [employer] [facility] phone #[phone]

## 2025-03-05 NOTE — PROGRESS NOTES
Personal Eduardo 3 Training    Met with Terrie Alicea today for a personal Eduardo 3 training. Terrie was accompanied by 3 caregivers so they all could receive education on the device. Terrie brought her own supplies to the visit which included a reader and sensors.    Educator reviewed the following:    -- Skin Prep  -- How to place the Eduardo 3  -- Importance of site rotation  -- Eduardo 3 alerts and alarms  -- How to scan for sensor warm-up  -- 60 minute warm-up  -- If reading doesn't match symptoms, do a finger stick  -- Change sensor in 15 days    Lab Results   Component Value Date    HGBA1C 7.3 (H) 12/20/2024     Patient response to instruction    Comprehension: good  Motivation: good  Expected Compliance: good  Response to Teachback: 100%, demonstrated understanding    Thank you for referring your patient to [facility], it was a pleasure working with them today. Please feel free to call with any questions or concerns.    Start- Stop: 10:45-11:53  Total Minutes: 68 Minutes  Group or Individual Instruction: DSME-I  Other: DO Catalina Kaminski, MS, RD, LDN  [address]  [address]  [place] [phone]

## 2025-03-06 ENCOUNTER — RA CDI HCC (OUTPATIENT)
Dept: OTHER | Facility: HOSPITAL | Age: 65
End: 2025-03-06

## 2025-03-06 ENCOUNTER — PATIENT MESSAGE (OUTPATIENT)
Dept: ENDOCRINOLOGY | Facility: CLINIC | Age: 65
End: 2025-03-06

## 2025-03-06 DIAGNOSIS — E11.9 DIABETES MELLITUS TYPE 2, INSULIN DEPENDENT (HCC): ICD-10-CM

## 2025-03-06 DIAGNOSIS — Z79.4 DIABETES MELLITUS TYPE 2, INSULIN DEPENDENT (HCC): ICD-10-CM

## 2025-03-06 NOTE — TELEPHONE ENCOUNTER
Reviewed blood sugar log, blood sugars are low at 12 AM    Please inform patient to reduce Novolin R  to 4 units at 6 PM and 4 units at midnight     Thanks

## 2025-03-06 NOTE — PROGRESS NOTES
HCC coding opportunities          Chart Reviewed number of suggestions sent to Provider: 2     Patients Insurance     Medicare Insurance: [employer]          1) Type 2 diabetes mellitus with diabetic polyneuropathy [E11.42]     Per ICD 10 documentation & coding guidelines, DM & Polyneuropathy have an assumed causal-effect relationship, UNLESS otherwise documented by the provider.     2) E11.22: Type 2 diabetes mellitus with diabetic chronic kidney disease (HCC)     As per ICD 10 coding guidelines, DM & CKD are presumed to have a causal-effect relationship unless documented as unrelated please review and assess. if applicable

## 2025-03-06 NOTE — PATIENT COMMUNICATION
Please make prescription ready for eduardo 3 sensor and reader  to replace every 14 days and to switch arms.  She had a Eduardo 3 placed yesterday.

## 2025-03-07 ENCOUNTER — OFFICE VISIT (OUTPATIENT)
Dept: CARDIOLOGY CLINIC | Facility: CLINIC | Age: 65
End: 2025-03-07
Payer: MEDICARE

## 2025-03-07 VITALS
SYSTOLIC BLOOD PRESSURE: 108 MMHG | OXYGEN SATURATION: 96 % | HEIGHT: 58 IN | WEIGHT: 88.6 LBS | BODY MASS INDEX: 18.6 KG/M2 | HEART RATE: 64 BPM | DIASTOLIC BLOOD PRESSURE: 68 MMHG

## 2025-03-07 DIAGNOSIS — E78.2 MIXED HYPERLIPIDEMIA: Primary | ICD-10-CM

## 2025-03-07 DIAGNOSIS — I95.9 HYPOTENSION, UNSPECIFIED HYPOTENSION TYPE: ICD-10-CM

## 2025-03-07 PROCEDURE — 99203 OFFICE O/P NEW LOW 30 MIN: CPT | Performed by: INTERNAL MEDICINE

## 2025-03-07 RX ORDER — HYDROCHLOROTHIAZIDE 12.5 MG/1
CAPSULE ORAL
Qty: 2 EACH | Refills: 11 | Status: SHIPPED | OUTPATIENT
Start: 2025-03-07

## 2025-03-07 RX ORDER — KETOROLAC TROMETHAMINE 30 MG/ML
INJECTION, SOLUTION INTRAMUSCULAR; INTRAVENOUS
Qty: 1 EACH | Refills: 0 | Status: SHIPPED | OUTPATIENT
Start: 2025-03-07

## 2025-03-07 RX ORDER — MIDODRINE HYDROCHLORIDE 5 MG/1
5 TABLET ORAL 2 TIMES DAILY
Qty: 30 TABLET | Refills: 5 | Status: SHIPPED | OUTPATIENT
Start: 2025-03-07

## 2025-03-07 NOTE — PROGRESS NOTES
Cardiology Clinic Visit Note  Terrie Alicea 64 y.o. female   MRN: [id number]    Assessment and Plan        Hypotension  - Terrie has longstanding history of hypotension, previously attributed to low-dose lisinopril she was receiving for proteinuria/diabetes. Since discontinuation of this medication in 2018, blood pressure has been stable with baseline systolic blood pressure in the  at her outpatient office visits.   - She was started on midodrine in 8/2024 during one of her hospitalizations.  - It is unclear if Terrie is symptomatic from low blood pressure as she is primarily nonambulatory and would not tell.  - During our last visit 12/2024, we continued midodrine 5 mg 3 times daily, with hold parameters of SBP greater than 100.  - Her blood pressure has been stable on review of her home blood pressure log which is scanned in the chart.  She has rarely needed the midodrine.  - As Terrie is starting formal PT, will continue midodrine as she might need it, however will decrease the dose to 5 mg twice daily,in the morning and afternoon. Continue to hold if systolic blood pressure greater than 100.  - In the future, could consider taking her off of midodrine based of her blood pressure reading at home.  -     midodrine (PROAMATINE) 5 mg tablet; 1 tablet (5 mg total) by Per G Tube route 2 (two) times a day For hypotension, hold for systolic blood pressure greater than 100.             Interval History:  Terrie Alicea returns today for follow up. Since our last visit on 12/6/2024, Terrie is doing much better.  Her caregiver, Lisa, reports that she is more verbal, trying to stand and walk with assist.  They are planning to start formal home PT/OT.  She is tolerating her tube feeds and is cleared for pleasure foods.  Her weight is stable.  Her blood pressure has been stable and she rarely needs the midodrine.  No cardiopulmonary concerns or complaints today.      History of Present Illness:  I first met Terrie FREED  [place] on 12/6/2024 when she was referred for evaluation of hypotension.   She has past medical history including but not limited to of cerebral palsy, minimally verbal at baseline, wheelchair/PEG dependent, history of Parkinson's disease, diabetes mellitus, malnutrition, chronic anemia and hyperlipidemia.  [facility] which is an intermittent care facility with 24/7 nursing care.    Patient was previously seen in our office between 0718-1409 by Dr. Weiss for hypotension.  At that time she was on low-dose lisinopril for renal protection in the setting of diabetes and proteinuria.  Echocardiogram at that time was unremarkable.  With discontinuation of lisinopril, blood pressure improved and she has not needed to follow with us.      Terrie has a caregiver, Lisa, who have known her for over 16 years.  At baseline, Terrie is alert, interactive, says few words/waves and can point to pain if she is experiencing any. She is currently wheelchair-bound, however, trying to stand and walk a little with assistance from her staff at the facility.    In 2024 Terrie was hospitalized multiple times for different issues including fever, sepsis from urinary tract infection, pneumonia, aspiration, intramuscular hematoma attributed to subcu heparin for DVT prophylaxis, hypercalcemia and SCAR.  During one of her hospitalization, she was briefly in the ICU for vasopressor support for hypotension.  At that time she was prescribed midodrine which she is currently receiving.       Previous Cardiology Workup:    TREADMILL STRESS  No results found for this or any previous visit.     ----------------------------------------------------------------------------------------------  NUCLEAR STRESS TEST: No results found for this or any previous visit.    No results found for this or any previous visit.      --------------------------------------------------------------------------------  CATH:  No results found  for this or any previous visit.    --------------------------------------------------------------------------------  ECHO: 2016  SUMMARY     LEFT VENTRICLE:  Systolic function was normal. Ejection fraction was estimated to be 60 %.  There were no regional wall motion abnormalities.  Left ventricular diastolic function parameters were normal.     RIGHT VENTRICLE:  The size was normal.  Systolic function was normal.    --------------------------------------------------------------------------------  HOLTER  No results found for this or any previous visit.    --------------------------------------------------------------------------------  CAROTIDS  No results found for this or any previous visit.       ---------------------------------------------------------------------------------  Review of Systems   Unable to perform ROS: Patient nonverbal         Current Outpatient Medications:     Alcohol Swabs 70 % PADS, May substitute brand based on insurance coverage. Check glucose TID., Disp: 100 each, Rfl: 0    ARIPiprazole (ABILIFY) 2 mg tablet, 1 tablet by Per G Tube route daily at bedtime, Disp: , Rfl:     Ascorbic Acid (vitamin C) 1000 MG tablet, 1 tablet (1,000 mg total) by Per G Tube route daily, Disp: 90 tablet, Rfl: 3    baclofen 10 mg tablet, TAKE 1 AND 1/2 TABLETS BY MOUTH (15MG) THREE TIMES A DAY FOR SPASTICITY, Disp: 135 tablet, Rfl: 10    Blood Glucose Monitoring Suppl (OneTouch Verio Reflect) w/Device KIT, May substitute brand based on insurance coverage. Check glucose TID., Disp: 1 kit, Rfl: 0    carbidopa-levodopa (SINEMET CR)  mg TBCR per ER tablet, TAKE 2 TABLETS BY MOUTH (50/200MG) TWICE A DAY (PARKINSONS DISEASE), Disp: 112 tablet, Rfl: 10    Continuous Glucose  (FreeStyle Eduardo 3 [place]) SHAI, Use with freestyle eduardo 3+ sensor to monitor blood glucose levels continuously, Disp: 1 each, Rfl: 0    Continuous Glucose Sensor (FreeStyle Eduardo 3 Plus Sensor) MISC, Use to monitor blood glucose  levels continuously., Disp: 2 each, Rfl: 11    denosumab (PROLIA) 60 mg/mL, Inject 1 mL (60 mg total) under the skin once for 1 dose, Disp: , Rfl:     Dextrose, Diabetic Use, 15 GM/33GM GEL, Take 1 Dose by mouth daily as needed (Please give as needed for blood sugars between 61 and 80 and recheck blood sugar in 20 minutes), Disp: 33 g, Rfl: 6    escitalopram (LEXAPRO) 10 mg tablet, Take 20 mg by mouth daily, Disp: , Rfl:     Ferrous Sulfate 300 (60 Fe) mg/5 mL solution, 5 mL (300 mg total) by Per G Tube route every other day, Disp: 38 mL, Rfl: 5    Glucagon (Gvoke HypoPen 2-Pack) 0.5 MG/0.1ML SOAJ, Inject 1 mg under the skin every 15 (fifteen) minutes as needed (for symptomatic blood glucose <60 and/or if patient is unconcious or seizing) Call 911 after use of pen and then notify Endocrinology provider, Disp: 0.2 mL, Rfl: 0    glucose 40 %, DISSOLVE IN WARM WATER and administer via peg tube as needed for asymptomatic for blood sugars less than 60 mg/dL and notify endocrine provider. Check blood glucose level in 15 minutes and repeat administration if blood glucose levels are less than 100. Repeat process until blood glucose level is above 100., Disp: 12.5 g, Rfl: 1    glucose blood (OneTouch Verio) test strip, May substitute brand based on insurance coverage. Check glucose TID., Disp: 100 each, Rfl: 0    guaiFENesin (DIABETIC TUSSIN EX) 100 MG/5ML oral liquid, 10 mL (200 mg total) by Per G Tube route 4 (four) times a day as needed for cough or congestion, Disp: , Rfl:     hydrocortisone 1 % cream, Apply topically to affected area twice daily as needed for rash, Disp: 30 g, Rfl: 0    Insulin Pen Needle (BD Pen Needle Yue 2nd Gen) 32G X 4 MM MISC, Use to inject insulin 4 times daily, Disp: 400 each, Rfl: 3    insulin regular (HumuLIN R,NovoLIN R) 100 units/mL injection, Inject 1-5 Units under the skin every 6 (six) hours if 0 - 179 = 0; 180 - 224 = 1 unit; 225 - 299 = 2 unit; 300 - 374 = 3 unit; 375 - 449 = 4 unit;  450+ = 5 unit.  Call MD for BS less than 60 or greater than 450, Disp: 10 mL, Rfl: 2    insulin regular (HumuLIN R,NovoLIN R) 100 units/mL injection, inject 4 units subcutaneously at 6 PM and midnight while tube feeds are running and 2 units at 6 AM, Disp: , Rfl:     lansoprazole (PREVACID SOLUTAB) 15 mg disintegrating tablet, 1 tablet (15 mg total) by Per G Tube route daily Please dissolve in water., Disp: 30 tablet, Rfl: 1    levothyroxine 25 mcg tablet, 1 tablet (25 mcg total) by Per G Tube route daily TAKE 1 TABLET BY MOUTH 1 HOUR BEFORE STARTING TUBE FEEDS (HYPOTHYROIDISM), Disp: , Rfl:     LORazepam (Ativan) 0.5 mg tablet, 1 tablet (0.5 mg total) by Per G Tube route daily at bedtime for 10 days Hold for sedation, Disp: , Rfl:     methenamine hippurate (HIPREX) 1 g tablet, Crush 1 tablet two times a day per peg tube, Disp: 60 tablet, Rfl: 7    midodrine (PROAMATINE) 5 mg tablet, 1 tablet (5 mg total) by Per G Tube route 3 (three) times a day For hypotension, hold for systolic blood pressure greater than 100., Disp: 90 tablet, Rfl: 3    neomycin-bacitracin-polymyxin b (NEOSPORIN) ointment, Apply 1 application topically 2 (two) times a day as needed Apply to affected area BID PRN, Disp: , Rfl:     olopatadine HCl (PATADAY) 0.2 % opth drops, Administer 1 drop to both eyes as needed Instill 1 drop into affected eyes daily PRN for eye redness, Disp: , Rfl:     oxybutynin (DITROPAN) 5 mg tablet, 1 tablet (5 mg total) by Per G Tube route 3 (three) times a day, Disp: , Rfl:     polyethylene glycol (GLYCOLAX) 17 GM/SCOOP powder, 17 g by Per G Tube route daily, Disp: 510 g, Rfl: 0    Refresh Liquigel 1 % GEL, 2 (two) times a day 1 drop Bid bilaterally, Disp: , Rfl:     simvastatin (ZOCOR) 20 mg tablet, Daily at 4:00 PM., Disp: , Rfl:     Syringe, Disposable, 10 ML MISC, Use 3 (three) times a day To be used with tube feeding., Disp: 100 each, Rfl: 5    traZODone (DESYREL) 100 mg tablet, 1 tablet (100 mg total) by Per G  Tube route daily at bedtime, Disp: , Rfl:     valproic acid (DEPAKENE) 250 MG/5ML soln, 2.5 mL (125 mg total) by Per G Tube route 2 (two) times a day, Disp: 473 mL, Rfl: 1    Vitamins A & D (VITAMIN A & D) ointment, Apply topically as needed for skin irritation, Disp: , Rfl:     baclofen 10 mg tablet, Take 10 mg by mouth (Patient not taking: Reported on 3/7/2025), Disp: , Rfl:   Past Medical History:   Diagnosis Date    Acute metabolic encephalopathy 12/11/2015    Adjustment disorder     SCAR (acute kidney injury) ([facility]) 05/10/2024    Altered mental status 12/11/2015    Anemia     Bipolar 1 disorder ([facility])     Cerebral palsy ([facility])     Chronic hypernatremia 02/06/2016    Closed fracture of left hip ([facility]) 01/19/2016    Closed left hip fracture ([facility])     no surgery    Constipation     Dehydration 02/20/2016    Developmental delay     Diabetes mellitus ([facility])     Difficulty walking     Disease of thyroid gland     Diverticulosis     Dysphagia     Worsening    Fracture of multiple ribs of right side     Gastric ulcer     Herpes zoster without complication 06/02/2021    Hip fracture ([facility]) 07/26/2015    left    Hypercalcemia 08/19/2021    Hyperlipidemia     Hypernatremia 12/28/2018    Hypotension     Impulse control disorder     Incontinence     Intellectual disability due to developmental disorder, unspecified     Microalbuminuria     Neuropathy in diabetes ([facility])     Osteopathia     Osteoporosis     Peripheral neuropathy     Pneumonia 12/28/2018    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[facility])     Thrombocytopenia ([facility]) 07/31/2015     Past Surgical History:   Procedure Laterality Date    ABDOMINAL SURGERY      COLECTOMY MIN      re-anastomosis 7/22/16    COLON SURGERY  1/31/16    COLONOSCOPY N/A 07/21/2016    Procedure: COLONOSCOPY;  Surgeon: Rosalino Jacome MD;  Location: [facility];  Service:     COLONOSCOPY N/A 01/31/2016    Procedure: COLONOSCOPY;  Surgeon: Rosalino Jacome MD;  [facility];  Service:     COLONOSCOPY N/A  07/25/2017    Procedure: COLONOSCOPY;  Surgeon: Rosalino Jacome MD;  [facility]: [facility];  Service: Colorectal    COLOSTOMY      EXPLORATORY LAPAROTOMY W/ BOWEL RESECTION N/A 01/31/2016    Procedure: exploratory laparotomy, left sigmoidectomy, coloproctostomy, take down splenic flexure, loop colostomy;  Surgeon: Rosalino Jacome MD;  [facility];  Service:     GASTROSTOMY TUBE PLACEMENT N/A 8/26/2024    Procedure: INSERTION GASTROSTOMY TUBE OPEN;  Surgeon: Monroe Jean DO;  [facility];  Service: General    CO CLOSURE ENTEROSTOMY LG/SMALL INTESTINE N/A 07/22/2016    Procedure: SEGMENTAL COLECTOMY WITH COLOCOLOSTOMY;  Surgeon: Rosalino Jacome MD;  [facility];  Service: Colorectal    UPPER GASTROINTESTINAL ENDOSCOPY       Social History     Socioeconomic History    Marital status: Single     Spouse name: Not on file    Number of children: Not on file    Years of education: Not on file    Highest education level: Not on file   Occupational History    Not on file   Tobacco Use    Smoking status: Never    Smokeless tobacco: Never   Vaping Use    Vaping status: Never Used   Substance and Sexual Activity    Alcohol use: Not Currently    Drug use: No    Sexual activity: Never   Other Topics Concern    Not on file   Social History Narrative    Lives in a group home      Social Drivers of Health     Financial Resource Strain: Low Risk  (5/15/2024)    Overall Financial Resource Strain (CARDIA)     Difficulty of Paying Living Expenses: Not hard at all   Food Insecurity: No Food Insecurity (12/6/2024)    Hunger Vital Sign     Worried About Running Out of Food in the Last Year: Never true     Ran Out of Food in the Last Year: Never true   Transportation Needs: No Transportation Needs (12/6/2024)    PRAPARE - Transportation     Lack of Transportation (Medical): No     Lack of Transportation (Non-Medical): No   Physical Activity: Inactive (11/2/2023)    Exercise Vital Sign     Days of  "Exercise per Week: 0 days     Minutes of Exercise per Session: 0 min   Stress: No Stress Concern Present (5/20/2024)    [employer] - Occupational Stress Questionnaire     Feeling of Stress : Not at all   Social Connections: Socially Isolated (11/2/2023)    Social Connection and Isolation Panel [NHANES]     Frequency of Communication with Friends and Family: Never     Frequency of Social Gatherings with Friends and Family: Never     Attends [employer] Services: Never     Active Member of Clubs or Organizations: No     Attends Club or Organization Meetings: Never     Marital Status: Never    Intimate Partner Violence: Not At Risk (5/20/2024)    Humiliation, Afraid, Rape, and Kick questionnaire     Fear of Current or Ex-Partner: No     Emotionally Abused: No     Physically Abused: No     Sexually Abused: No   Housing Stability: Low Risk  (12/6/2024)    Housing Stability Vital Sign     Unable to Pay for Housing in the Last Year: No     Number of Times Moved in the Last Year: 0     Homeless in the Last Year: No     Family History   Problem Relation Age of Onset    Alcohol abuse Mother     Alcohol abuse Father     Diabetes Sister     No Known Problems Sister     No Known Problems Sister      Allergies   Allergen Reactions    Ingrezza [Valbenazine Tosylate] Other (See Comments)     Behavioral changes per caregiver    Valbenazine Other (See Comments)    Keflex [Cephalexin] GI Intolerance       Objective     Vitals:    03/07/25 0856   BP: 108/68   BP Location: Left arm   Patient Position: Sitting   Cuff Size: Standard   Pulse: 64   SpO2: 96%   Weight: 40.2 kg (88 lb 9.6 oz)   Height: 4' 10\" (1.473 m)       Physical Exam  Vitals and nursing note reviewed.   Constitutional:       General: She is not in acute distress.  Cardiovascular:      Rate and Rhythm: Normal rate.      Heart sounds: Normal heart sounds. No murmur heard.  Pulmonary:      Breath sounds: Normal breath sounds. No wheezing. "   Neurological:      Mental Status: She is alert.         Drea Camarena MD  Cardiology Fellow FY-1  ==  PLEASE NOTE:  This encounter was completed utilizing the [employer] Direct Speech Voice Recognition Software. Grammatical errors, random word insertions, pronoun errors and incomplete sentences are occasional consequences of the system due to software limitations, ambient noise and hardware issues.These may be missed by proof reading prior to affixing electronic signature. Any questions or concerns about the content, text or information contained within the body of this dictation should be directly addressed to the physician for clarification. Please do not hesitate to call me directly if you have any any questions or concerns.

## 2025-03-11 ENCOUNTER — OFFICE VISIT (OUTPATIENT)
Dept: FAMILY MEDICINE CLINIC | Facility: CLINIC | Age: 65
End: 2025-03-11

## 2025-03-11 VITALS
HEIGHT: 58 IN | OXYGEN SATURATION: 97 % | SYSTOLIC BLOOD PRESSURE: 110 MMHG | RESPIRATION RATE: 18 BRPM | BODY MASS INDEX: 18.56 KG/M2 | HEART RATE: 63 BPM | WEIGHT: 88.4 LBS | DIASTOLIC BLOOD PRESSURE: 68 MMHG | TEMPERATURE: 98 F

## 2025-03-11 DIAGNOSIS — I95.0 IDIOPATHIC HYPOTENSION: ICD-10-CM

## 2025-03-11 DIAGNOSIS — E11.9 DIABETES MELLITUS TYPE 2, INSULIN DEPENDENT (HCC): Primary | ICD-10-CM

## 2025-03-11 DIAGNOSIS — Z11.1 SCREENING-PULMONARY TB: ICD-10-CM

## 2025-03-11 DIAGNOSIS — Z79.4 DIABETES MELLITUS TYPE 2, INSULIN DEPENDENT (HCC): Primary | ICD-10-CM

## 2025-03-11 PROCEDURE — 99213 OFFICE O/P EST LOW 20 MIN: CPT | Performed by: FAMILY MEDICINE

## 2025-03-11 PROCEDURE — G2211 COMPLEX E/M VISIT ADD ON: HCPCS | Performed by: FAMILY MEDICINE

## 2025-03-11 NOTE — PROGRESS NOTES
Name: Terrie Alicea      : 1960      MRN: [id number]  Encounter Provider: Floridalma Phillips MD  Encounter Date: 3/11/2025   Encounter department: [facility] [place]  :  Assessment & Plan  Diabetes mellitus type 2, insulin dependent (HCC)  Patient with T2DM on insulin, now using CGM for BGL monitoring. BGL log reviewed with caregiver. No lows <60, though sugars remain occasionally labile. She was recently reduced to 2U novolin TID by endocrinology. Patient is up to date on diabetic eye and foot exams.   Due for urine albumin:Cr and A1c at the end of March  Lab Results   Component Value Date    HGBA1C 7.3 (H) 2024            Idiopathic hypotension  Home Bps at goal with SBP range . No symptomatic lows. Following with cardiology.   Continue midodrine 5mg BID with hold parameters       Screening-pulmonary TB  Quantiferon Gold ordered at request of caregiver  Orders:    Quantiferon TB Gold Plus Assay; Future           History of Present Illness   Patient is a 65 y/o F PMHx significant for cerebral palsy, hypotension, T2DM presenting to clinic for 3 month follow up. Patient is nonverbal, accompanied by caregiver who provides history. Terrie is overall doing well, no acute concerns raised today. She is currently following with endocrinology, cardiology, ophthalmology, and podiatry. Mentation has been baseline. She is currently receiving tube feeds overnight with daytime pleasure feeds as desired. Caregivers are measuring BP at home, SBP ranging mostly in  range. She is now receiving midodrine 2x daily as directed by cardiology and has not been reported to experience any symptomatic hypotensive episodes. She is following with endocrinology for diabetes management and has recently begun using the Eduardo 3 CGM. She is now receiving 2U novolin TID per endocrine, no hypoglycemic episodes.       Review of Systems   Unable to perform ROS: Patient nonverbal  "      Objective   /68   Pulse 63   Temp 98 °F (36.7 °C) (Temporal)   Resp 18   Ht 4' 10\" (1.473 m)   Wt 40.1 kg (88 lb 6.4 oz)   LMP 11/29/2009 (Exact Date)   SpO2 97%   BMI 18.48 kg/m²      Physical Exam  Vitals reviewed.   Constitutional:       Appearance: Normal appearance. She is not ill-appearing.   HENT:      Head: Normocephalic and atraumatic.   Eyes:      Conjunctiva/sclera: Conjunctivae normal.   Cardiovascular:      Rate and Rhythm: Normal rate and regular rhythm.      Pulses: Normal pulses.      Heart sounds: Normal heart sounds.   Pulmonary:      Effort: Pulmonary effort is normal.      Breath sounds: Normal breath sounds.   Musculoskeletal:      Right lower leg: No edema.      Left lower leg: No edema.   Skin:     General: Skin is warm and dry.   Neurological:      Mental Status: She is alert. Mental status is at baseline.           This note was written by medical student Alec Hardin. I have reviewed his note and I agree with his documentation.    Floridalma Phillips M.D.  [facility] Medicine PGY2  3/11/2025, 3:19 PM    "

## 2025-03-11 NOTE — ASSESSMENT & PLAN NOTE
Patient with T2DM on insulin, now using CGM for BGL monitoring. BGL log reviewed with caregiver. No lows <60, though sugars remain occasionally labile. She was recently reduced to 2U novolin TID by endocrinology. Patient is up to date on diabetic eye and foot exams.   Due for urine albumin:Cr and A1c at the end of March  Lab Results   Component Value Date    HGBA1C 7.3 (H) 12/20/2024

## 2025-03-18 ENCOUNTER — NURSE TRIAGE (OUTPATIENT)
Age: 65
End: 2025-03-18

## 2025-03-18 ENCOUNTER — PATIENT MESSAGE (OUTPATIENT)
Dept: ENDOCRINOLOGY | Facility: CLINIC | Age: 65
End: 2025-03-18

## 2025-03-18 DIAGNOSIS — E11.9 DIABETES MELLITUS TYPE 2, INSULIN DEPENDENT (HCC): ICD-10-CM

## 2025-03-18 DIAGNOSIS — Z79.4 DIABETES MELLITUS TYPE 2, INSULIN DEPENDENT (HCC): ICD-10-CM

## 2025-03-18 NOTE — TELEPHONE ENCOUNTER
Medication: Glucose 40%    Dose/Frequency: DISSOLVE IN WARM WATER and administer via peg tube as needed for asymptomatic for blood sugars less than 60 mg/dL and notify endocrine provider. Check blood glucose level in 15 minutes and repeat administration if blood glucose levels are less than 100. Repeat process until blood glucose level is above 100     Quantity: requesting 30 day with refills     Pharmacy: [facility] [place]     Office:   [] PCP/Provider -   [x] Speciality/Provider - endo    Does the patient have enough for 3 days?   [x] Yes   [] No - Send as HP to POD

## 2025-03-18 NOTE — TELEPHONE ENCOUNTER
Regarding: blood sugars  ----- Message from Irene BRAGA sent at 3/18/2025  9:45 AM EDT -----  Patient  blood sugar drop  while she  was  sleeping.  Please call rex- [phone]  from  step  by step.

## 2025-03-18 NOTE — TELEPHONE ENCOUNTER
Please inform pt to change Novolin R       inject 2 units subcutaneously at 6 PM at the start of tube feed and  2 units at 6 AM     Discontinue 12 midnight dose       Shantelle Sargent

## 2025-03-18 NOTE — TELEPHONE ENCOUNTER
"    FOLLOW UP: Discuss with provider and call back today    REASON FOR CONVERSATION: Hypoglycemia - no symptoms    SYMPTOMS: denies symptoms- was sleeping at time of low bg    OTHER: administered glucose gel via peg tube around 0330a- bg @ 97 15 mins after administration.    DISPOSITION: Discuss With PCP and Callback by Nurse Today        Reason for Disposition   Caller has NON-URGENT medication or insulin device (e.g., pump, continuous monitoring)  question and triager unable to answer question    Answer Assessment - Initial Assessment Questions  1. SYMPTOMS: \"What symptoms are you concerned about?\"      No symptoms   2. ONSET:  \"When did the symptoms start?\"      0300  3. BLOOD GLUCOSE: \"What is your blood glucose level?\"       0600 last reading- 187   4. USUAL RANGE: \"What is your blood glucose level usually?\" (e.g., usual fasting morning value, usual evening value)      Unsure- states she's up and down  5. TYPE 1 or 2:  \"Do you know what type of diabetes you have?\"  (e.g., Type 1, Type 2, Gestational; doesn't know)       Type 2   6. INSULIN: \"Do you take insulin?\" \"What type of insulin(s) do you use? What is the mode of delivery? (syringe, pen; injection or pump) \"When did you last give yourself an insulin dose?\" (i.e., time or hours/minutes ago) \"How much did you give?\" (i.e., how many units)      Yes. insulin regular (HumuLIN R,NovoLIN R) 100 units/mL injection-  inject 2 units subcutaneously at 6 PM and midnight while tube feeds are running and 2 units at 6 AM  7. DIABETES PILLS: \"Do you take any pills for your diabetes?\" If Yes, ask: \"What is the name of the medicine(s) that you take for high blood sugar?\"      Denies   8. OTHER SYMPTOMS: \"Do you have any symptoms?\" (e.g., fever, frequent urination, difficulty breathing, vomiting)      No symptoms   9. LOW BLOOD GLUCOSE TREATMENT: \"What have you done so far to treat the low blood glucose level?\"      Glucose gel via gtube 0330am   10. FOOD: \"When did you " "last eat or drink?\"        Tube feed- 6p-6am  11. ALONE: \"Are you alone right now or is someone with you?\"         With nursing staff    Protocols used: Diabetes - Low Blood Sugar-Adult-OH      Call out to Lisa, patients care giver due to above triage request. Lisa states Patient just started with FreeStyle Eduardo 3, and is not connected to office- states she's able to bring sensor in for download if needed. Reports Patient had hypogylcemic event last night at 0300- bg alerted at 55, Patient was asymptomatic and sleeping at time. Order noted on to call provider for bg less than 60. Staff admin glucose gel via peg tube and rechecked bg 15 mins after with bg of 97. States Patient typically never goes low, reports that her bg levels are typically all over, and she does not have a \"normal\". Reports patient takes- Novolin per sliding scale orders, and also administers Novlin- 2u at 6pm and 12am while tube feeds are running and 2u at 0600.     3/18- 12a- 137- 2 units Novlin given          0300- 55 - gave glucose gel          0315- 97          0600- 187 - 3u novlin   Reports patient is stable right now currently at group with no s/s hypogylcemia. States prior to leaving for group bg was around 170. Per protocol, discuss with provider and call patient back. Reviewed care advice with lisa. Please advise, please contact Lisa at [phone]. Thank you      "

## 2025-03-19 RX ORDER — NICOTINE POLACRILEX 4 MG
LOZENGE BUCCAL
Qty: 12.5 G | Refills: 1 | Status: SHIPPED | OUTPATIENT
Start: 2025-03-19

## 2025-03-19 NOTE — TELEPHONE ENCOUNTER
Caretaker states she wants to wait to change cause tube feeds night have been messed up they had switched her new tube feeds and she is doing fine....she states she will share the sugars.....states the sugar drop only happen once

## 2025-03-19 NOTE — TELEPHONE ENCOUNTER
Received a call from Lisa asking for the Eduardo share code which was given to her and she stated if you would like to review her sugars as she just set up new reader for the pt and if you need to reach her to call [phone]

## 2025-03-24 ENCOUNTER — APPOINTMENT (OUTPATIENT)
Dept: LAB | Facility: CLINIC | Age: 65
End: 2025-03-24
Payer: MEDICARE

## 2025-03-24 DIAGNOSIS — E11.9 DIABETES MELLITUS TYPE 2, INSULIN DEPENDENT (HCC): ICD-10-CM

## 2025-03-24 DIAGNOSIS — Z79.4 DIABETES MELLITUS TYPE 2, INSULIN DEPENDENT (HCC): ICD-10-CM

## 2025-03-24 LAB
ANION GAP SERPL CALCULATED.3IONS-SCNC: 6 MMOL/L (ref 4–13)
BUN SERPL-MCNC: 35 MG/DL (ref 5–25)
CALCIUM SERPL-MCNC: 9.9 MG/DL (ref 8.4–10.2)
CHLORIDE SERPL-SCNC: 102 MMOL/L (ref 96–108)
CO2 SERPL-SCNC: 35 MMOL/L (ref 21–32)
CREAT SERPL-MCNC: 0.97 MG/DL (ref 0.6–1.3)
CREAT UR-MCNC: 33.8 MG/DL
EST. AVERAGE GLUCOSE BLD GHB EST-MCNC: 166 MG/DL
GFR SERPL CREATININE-BSD FRML MDRD: 62 ML/MIN/1.73SQ M
GLUCOSE SERPL-MCNC: 113 MG/DL (ref 65–140)
HBA1C MFR BLD: 7.4 %
MICROALBUMIN UR-MCNC: <7 MG/L
POTASSIUM SERPL-SCNC: 4.5 MMOL/L (ref 3.5–5.3)
SODIUM SERPL-SCNC: 143 MMOL/L (ref 135–147)

## 2025-03-24 PROCEDURE — 80048 BASIC METABOLIC PNL TOTAL CA: CPT

## 2025-03-24 PROCEDURE — 83036 HEMOGLOBIN GLYCOSYLATED A1C: CPT

## 2025-03-24 PROCEDURE — 36415 COLL VENOUS BLD VENIPUNCTURE: CPT

## 2025-03-24 PROCEDURE — 82043 UR ALBUMIN QUANTITATIVE: CPT

## 2025-03-24 PROCEDURE — 82570 ASSAY OF URINE CREATININE: CPT

## 2025-03-24 NOTE — PATIENT COMMUNICATION
AUDIOLOGICAL EVALUATION      REASON FOR TESTING:  Audiometric evaluation per the request of Dr. Dolly Mcgarry, due to the diagnosis of decreased hearing of both ears. The patient reports hearing loss for at least one year. She notices episodic tinnitus in both ears and occasional aural fullness in the right ear. She denies a history of noise exposure. OTOSCOPY: Clear canal and normal appearing tympanic membrane, bilaterally. AUDIOGRAM            Reliability: Good  Audiometer Used:  [id number]        COMMENTS: Normal hearing sloping to a moderate sensorineural hearing loss for the left ear. Mild sloping to moderate mixed hearing loss for the right ear. Speech discrimination ability is good at 92% for the left ear and good at 96% for the right ear. Tympanometry revealed normal peak pressure and normal middle ear compliance for the left ear and normal middle ear compliance with excessively negative middle ear pressure in the right ear. RECOMMENDATION(S): ENT provider consultation is recommended due to unilateral middle ear dysfunction and mixed hearing loss in the left ear. Testing should be repeated following completion of any medical management. I am not able to see blood sugars, log   Please scan it or print and give it to me   Thanks     Shantelle Sargent

## 2025-03-25 ENCOUNTER — RESULTS FOLLOW-UP (OUTPATIENT)
Age: 65
End: 2025-03-25

## 2025-04-09 ENCOUNTER — OFFICE VISIT (OUTPATIENT)
Dept: PODIATRY | Facility: CLINIC | Age: 65
End: 2025-04-09
Payer: MEDICARE

## 2025-04-09 ENCOUNTER — TELEPHONE (OUTPATIENT)
Dept: FAMILY MEDICINE CLINIC | Facility: CLINIC | Age: 65
End: 2025-04-09

## 2025-04-09 DIAGNOSIS — G20.A1 PARKINSON'S DISEASE, UNSPECIFIED WHETHER DYSKINESIA PRESENT, UNSPECIFIED WHETHER MANIFESTATIONS FLUCTUATE (HCC): ICD-10-CM

## 2025-04-09 DIAGNOSIS — M79.674 PAIN IN TOES OF BOTH FEET: ICD-10-CM

## 2025-04-09 DIAGNOSIS — M79.675 PAIN IN TOES OF BOTH FEET: ICD-10-CM

## 2025-04-09 DIAGNOSIS — G63 POLYNEUROPATHY ASSOCIATED WITH UNDERLYING DISEASE (HCC): ICD-10-CM

## 2025-04-09 DIAGNOSIS — Z79.4 DIABETES MELLITUS TYPE 2, INSULIN DEPENDENT (HCC): Primary | ICD-10-CM

## 2025-04-09 DIAGNOSIS — E11.9 DIABETES MELLITUS TYPE 2, INSULIN DEPENDENT (HCC): Primary | ICD-10-CM

## 2025-04-09 DIAGNOSIS — B35.1 ONYCHOMYCOSIS: ICD-10-CM

## 2025-04-09 PROCEDURE — 11720 DEBRIDE NAIL 1-5: CPT | Performed by: PODIATRIST

## 2025-04-09 PROCEDURE — RECHECK: Performed by: PODIATRIST

## 2025-04-09 NOTE — PROGRESS NOTES
Name: Terrie Alicea      : 1960      MRN: [id number]  Encounter Provider: Manolo Eubanks DPM  Encounter Date: 2025   Encounter department: [facility] [place]  :  Assessment & Plan  Diabetes mellitus type 2, insulin dependent (HCC)    Lab Results   Component Value Date    HGBA1C 7.4 (H) 2025            Polyneuropathy associated with underlying disease (HCC)         Onychomycosis       Debride mycotic nails and thin the nail plates x 2 with the use of a nail nipper manually and an electric Dremel bur was used to reduce the thickness of the nail beds and smoothed the distal aspect of the nails.   Parkinson's disease, unspecified whether dyskinesia present, unspecified whether manifestations fluctuate (HCC)         Pain in toes of both feet         Pt was instructed to use lotion once a day on both feet such as cerave, Cetaphil or similar thick style of lotion.   Discussed proper shoe gear, daily inspections of feet, and general foot health with patient. Patient has Q8  findings and is recommended for at risk foot care every 9-10 weeks.    Patients most recent complete clinical foot exam was on: 2025    Return in about 10 weeks (around 2025).       History of Present Illness   HPI  Terrie Alicea is a 64 y.o. female who presents with chief complaint of painful thick nails on both feet.  She is a diabetic whose last A1c was 7.4 performed on 3/24/2025.  She was seen in her wheelchair with her support staff present.Patient presents for at-risk foot care.  Patient has no acute concerns today.  Patient has significant lower extremity risk due to neuropathy, parasthesia, edema, and trophic skin changes to the lower extremity.   History obtained from: patient's POA, patient's Legal Guardian, and patient's caregiver    Review of Systems  Medical History Reviewed by provider this encounter:     .  Current Outpatient Medications on File Prior to Visit   Medication Sig Dispense  Refill    Alcohol Swabs 70 % PADS May substitute brand based on insurance coverage. Check glucose TID. 100 each 0    ARIPiprazole (ABILIFY) 2 mg tablet 1 tablet by Per G Tube route daily at bedtime      Ascorbic Acid (vitamin C) 1000 MG tablet 1 tablet (1,000 mg total) by Per G Tube route daily 90 tablet 3    baclofen 10 mg tablet TAKE 1 AND 1/2 TABLETS BY MOUTH (15MG) THREE TIMES A DAY FOR SPASTICITY 135 tablet 10    Blood Glucose Monitoring Suppl (OneTouch Verio Reflect) w/Device KIT May substitute brand based on insurance coverage. Check glucose TID. 1 kit 0    carbidopa-levodopa (SINEMET CR)  mg TBCR per ER tablet TAKE 2 TABLETS BY MOUTH (50/200MG) TWICE A DAY (PARKINSONS DISEASE) 112 tablet 10    Continuous Glucose  (FreeStyle Eduardo 3 [place]) SHAI Use with freestyle eduardo 3+ sensor to monitor blood glucose levels continuously 1 each 0    Continuous Glucose Sensor (FreeStyle Eduardo 3 Plus Sensor) MISC Use to monitor blood glucose levels continuously. 2 each 11    denosumab (PROLIA) 60 mg/mL Inject 1 mL (60 mg total) under the skin once for 1 dose      Dextrose, Diabetic Use, 15 GM/33GM GEL Take 1 Dose by mouth daily as needed (Please give as needed for blood sugars between 61 and 80 and recheck blood sugar in 20 minutes) 33 g 6    escitalopram (LEXAPRO) 10 mg tablet Take 20 mg by mouth daily      Ferrous Sulfate 300 (60 Fe) mg/5 mL solution 5 mL (300 mg total) by Per G Tube route every other day 38 mL 5    Glucagon (Gvoke HypoPen 2-Pack) 0.5 MG/0.1ML SOAJ Inject 1 mg under the skin every 15 (fifteen) minutes as needed (for symptomatic blood glucose <60 and/or if patient is unconcious or seizing) Call 911 after use of pen and then notify Endocrinology provider 0.2 mL 0    glucose 40 % DISSOLVE IN WARM WATER and administer via peg tube as needed for asymptomatic for blood sugars less than 60 mg/dL and notify endocrine provider. Check blood glucose level in 15 minutes and repeat administration if blood  glucose levels are less than 100. Repeat process until blood glucose level is above 100. 12.5 g 1    glucose blood (OneTouch Verio) test strip May substitute brand based on insurance coverage. Check glucose TID. 100 each 0    guaiFENesin (DIABETIC TUSSIN EX) 100 MG/5ML oral liquid 10 mL (200 mg total) by Per G Tube route 4 (four) times a day as needed for cough or congestion      hydrocortisone 1 % cream Apply topically to affected area twice daily as needed for rash 30 g 0    Insulin Pen Needle (BD Pen Needle Yue 2nd Gen) 32G X 4 MM MISC Use to inject insulin 4 times daily 400 each 3    insulin regular (HumuLIN R,NovoLIN R) 100 units/mL injection inject 2 units subcutaneously at 6 PM at the start of tube feed and  2 units at 6 AM      insulin regular (HumuLIN R,NovoLIN R) 100 units/mL injection Inject 1-5 Units under the skin every 6 (six) hours if 0 - 179 = 0; 180 - 224 = 1 unit; 225 - 299 = 2 unit; 300 - 374 = 3 unit; 375 - 449 = 4 unit; 450+ = 5 unit.  Call MD for BS less than 60 or greater than 450 10 mL 2    lansoprazole (PREVACID SOLUTAB) 15 mg disintegrating tablet 1 tablet (15 mg total) by Per G Tube route daily Please dissolve in water. 30 tablet 1    levothyroxine 25 mcg tablet 1 tablet (25 mcg total) by Per G Tube route daily TAKE 1 TABLET BY MOUTH 1 HOUR BEFORE STARTING TUBE FEEDS (HYPOTHYROIDISM)      LORazepam (Ativan) 0.5 mg tablet 1 tablet (0.5 mg total) by Per G Tube route daily at bedtime for 10 days Hold for sedation      methenamine hippurate (HIPREX) 1 g tablet Crush 1 tablet two times a day per peg tube 60 tablet 7    midodrine (PROAMATINE) 5 mg tablet 1 tablet (5 mg total) by Per G Tube route 2 (two) times a day For hypotension, hold for systolic blood pressure greater than 100. 30 tablet 5    neomycin-bacitracin-polymyxin b (NEOSPORIN) ointment Apply 1 application topically 2 (two) times a day as needed Apply to affected area BID PRN      olopatadine HCl (PATADAY) 0.2 % opth drops  Administer 1 drop to both eyes as needed Instill 1 drop into affected eyes daily PRN for eye redness      oxybutynin (DITROPAN) 5 mg tablet 1 tablet (5 mg total) by Per G Tube route 3 (three) times a day      polyethylene glycol (GLYCOLAX) 17 GM/SCOOP powder 17 g by Per G Tube route daily 510 g 0    Refresh Liquigel 1 % GEL 2 (two) times a day 1 drop Bid bilaterally      simvastatin (ZOCOR) 20 mg tablet Daily at 4:00 PM.      Syringe, Disposable, 10 ML MISC Use 3 (three) times a day To be used with tube feeding. 100 each 5    traZODone (DESYREL) 100 mg tablet 1 tablet (100 mg total) by Per G Tube route daily at bedtime      valproic acid (DEPAKENE) 250 MG/5ML soln 2.5 mL (125 mg total) by Per G Tube route 2 (two) times a day 473 mL 1    Vitamins A & D (VITAMIN A & D) ointment Apply topically as needed for skin irritation       No current facility-administered medications on file prior to visit.      Social History     Tobacco Use    Smoking status: Never    Smokeless tobacco: Never   Vaping Use    Vaping status: Never Used   Substance and Sexual Activity    Alcohol use: Not Currently    Drug use: No    Sexual activity: Never        Objective   LMP 11/29/2009 (Exact Date)      Physical Exam  Vascular status is a faint 1/4 DP PT negative digital hair, normal distal cooling, immediate capillary refill bilaterally.  Capillary refill is approximately 2 to 3 seconds.  Mild amount of edema is present bilaterally     Derm nails are brittle elongated hypertrophic white-yellow discoloration with subungual debris x 2.  There is an increased thickness in the nails of approximately 1 to 2 mm.  There is loss of turgor noted bilaterally and thinning of the skin also seen bilaterally.     Ortho dropfoot deformities are noted bilaterally and hammertoes present on the fifth digits bilaterally.    Neuro light touch is intact and vibration is slightly diminished bilaterally.   Administrative Statements   I have spent a total time of 15  minutes in caring for this patient on the day of the visit/encounter including Risks and benefits of tx options, Instructions for management, Patient and family education, Importance of tx compliance, Risk factor reductions, Counseling / Coordination of care, Documenting in the medical record, and Obtaining or reviewing history  .

## 2025-04-09 NOTE — TELEPHONE ENCOUNTER
Office Notes faxed  Ailyn from [facility]  Has referral but needs office notes from last appointment notes in March, 2025    Ailyn can be reached at [phone]  Fax [phone]

## 2025-04-10 ENCOUNTER — TELEPHONE (OUTPATIENT)
Age: 65
End: 2025-04-10

## 2025-04-10 ENCOUNTER — PATIENT MESSAGE (OUTPATIENT)
Dept: ENDOCRINOLOGY | Facility: CLINIC | Age: 65
End: 2025-04-10

## 2025-04-10 ENCOUNTER — TELEPHONE (OUTPATIENT)
Dept: OTHER | Facility: OTHER | Age: 65
End: 2025-04-10

## 2025-04-10 DIAGNOSIS — Z79.4 DIABETES MELLITUS TYPE 2, INSULIN DEPENDENT (HCC): ICD-10-CM

## 2025-04-10 DIAGNOSIS — E11.9 DIABETES MELLITUS TYPE 2, INSULIN DEPENDENT (HCC): ICD-10-CM

## 2025-04-10 NOTE — TELEPHONE ENCOUNTER
Lisa from [facility] calling stating her blood sugars have been dropped this past week. She said she did a download of the CGM for the provider to review. She said they double check with a finger stick and it is pretty spot on. They had to give patient glucagon 2x this week. They are asking that the download be reviewed and call back [phone] with updates. They said she gets 2 units plus coverage at midnight and 3 units plus coverage in the morning due to her tube feedings. They are faxing her numbers and what her insulin coverage was.

## 2025-04-10 NOTE — TELEPHONE ENCOUNTER
Reviewed continuous glucose monitor sensor, patient usually have low blood sugar in 50-70 range around 8 PM    Please inform patient to reduce Humulin R to 1 unit at 6 PM at the start of tube feeds, continue 2 units at 6 AM  Shantelle Sargent

## 2025-04-10 NOTE — TELEPHONE ENCOUNTER
"Lisa RN stated, \"Patient's G tube fell out and I would like to know if I can put another one in or you want me to send patient to ED.\"     On call provider notified via secure chat   "

## 2025-04-11 DIAGNOSIS — K21.00 GASTROESOPHAGEAL REFLUX DISEASE WITH ESOPHAGITIS WITHOUT HEMORRHAGE: ICD-10-CM

## 2025-04-11 NOTE — TELEPHONE ENCOUNTER
Lisa calling back asking if Dr. Sargent can review the [employer] message and confirm she wants to move fwd with these changes. Thank you.

## 2025-04-14 ENCOUNTER — TELEPHONE (OUTPATIENT)
Age: 65
End: 2025-04-14

## 2025-04-18 LAB

## 2025-04-21 ENCOUNTER — TELEPHONE (OUTPATIENT)
Age: 65
End: 2025-04-21

## 2025-04-21 NOTE — TELEPHONE ENCOUNTER
received a call from bert Gonzalez's nurse, requesting to see if she missed a call for pt.  did not see anything for 4/21.

## 2025-04-24 LAB

## 2025-04-25 ENCOUNTER — TELEPHONE (OUTPATIENT)
Dept: FAMILY MEDICINE CLINIC | Facility: CLINIC | Age: 65
End: 2025-04-25

## 2025-04-25 NOTE — TELEPHONE ENCOUNTER
Signature Requested      Add on discipline   Order [id number]  Order [id number]  Dr Batres to review     Fax back to [phone]

## 2025-04-25 NOTE — TELEPHONE ENCOUNTER
Signature Requested '    Home health certification and plan of care Order [id number]    Dr Batres to review     Fax back to [phone]

## 2025-04-28 ENCOUNTER — TELEPHONE (OUTPATIENT)
Dept: FAMILY MEDICINE CLINIC | Facility: CLINIC | Age: 65
End: 2025-04-28

## 2025-04-28 NOTE — TELEPHONE ENCOUNTER
Signature Requested     Face to face encounter acknowledgement report     DR Fernando to review     Fax back to [phone]

## 2025-05-08 ENCOUNTER — OFFICE VISIT (OUTPATIENT)
Dept: NEUROLOGY | Facility: CLINIC | Age: 65
End: 2025-05-08
Payer: MEDICARE

## 2025-05-08 VITALS
SYSTOLIC BLOOD PRESSURE: 125 MMHG | DIASTOLIC BLOOD PRESSURE: 63 MMHG | HEIGHT: 58 IN | BODY MASS INDEX: 18.48 KG/M2 | HEART RATE: 63 BPM

## 2025-05-08 DIAGNOSIS — R25.2 SPASTICITY AS LATE EFFECT OF CEREBROVASCULAR ACCIDENT (CVA): ICD-10-CM

## 2025-05-08 DIAGNOSIS — G80.3 DYSTONIC CEREBRAL PALSY (HCC): Primary | ICD-10-CM

## 2025-05-08 DIAGNOSIS — I69.398 SPASTICITY AS LATE EFFECT OF CEREBROVASCULAR ACCIDENT (CVA): ICD-10-CM

## 2025-05-08 DIAGNOSIS — G20.C PARKINSONISM, UNSPECIFIED PARKINSONISM TYPE (HCC): ICD-10-CM

## 2025-05-08 PROCEDURE — 99213 OFFICE O/P EST LOW 20 MIN: CPT | Performed by: NURSE PRACTITIONER

## 2025-05-08 RX ORDER — BACLOFEN 10 MG/1
TABLET ORAL
Qty: 180 TABLET | Refills: 10 | Status: SHIPPED | OUTPATIENT
Start: 2025-05-08

## 2025-05-08 NOTE — PROGRESS NOTES
"Name: Terrie Alicea      : 1960      MRN: [id number]  Encounter Provider: LEATHA Silva  Encounter Date: 2025   Encounter department: [facility]  :  Assessment & Plan  Spasticity as late effect of cerebrovascular accident (CVA)    Orders:    baclofen 10 mg tablet; TAKE 2 TABLETS BY MOUTH  THREE TIMES A DAY FOR SPASTICITY    Dystonic cerebral palsy (HCC)  Patient continues to follow with PM&R, did have botox injections in several areas in the past with no improvement therefore was not continued. Has had new wheelchair the past year which has improved her symptoms. Increases in baclofen have been helpful for has spasticity which is improved from her prior visit with me. They are still having some difficulty with her hands-utilizing certain braces to assist. Will plan to further increase her baclofen to 20 mg TID to see if this assists, if side effects should return to prior dose. Should continue follow up with PM&R.         Parkinsonism, unspecified Parkinsonism type (HCC)  Tremor continues to be stable on low dose sinemet. Remains on ability. Contineus with movements of the mouth consistent with TD-present for over a decade with no significant change other then slight worsening with the addition of sinemet. Did have side effects with ingrezza in the past. Again discuss possible reductions of sinemet vs the addition of amantadine, given her stability opted to make no changes today.              History of Present Illness   HPI   Terrie Alicea is a 64 y.o. female with a PMH of intellectual disability 2/2 cerebral palsy, diabetes, and dysphagia presenting for follow up.        To review, she has followed with our epilepsy team since 2016 for history of seizure like episodes.  When she was seen in 10/2021  her largest concern was increased tremors, and gait changes, possible \"freezing\"Her exam did show significant spasticity. Symptoms worsening over the past few months. " She was started on a trial of sinemet due to worsening parkinsonain features, she was referred to PM&R due to spasticity.  Did see PM&R for inital consult 4/2022 in which there was a question of dystonia vs spasticity, plan was for possible botox injections.      Last office visit 12/2024 in which baclofen was increased.      Interval History:  Since last visit her spasticity has improved. Her hands are contractured, uses braces and splints to try to assist with opening her hands and preventing this.   Has tried botox in this area previously with not much improvement.   LE symptoms seem to be controlled with wheelchair positioning.   Currently in OT/PT.  They have been able to stand her and ambulate her several feet.              Mouth movements have been present for 10-15 years-  sinemet has somewhat increased these. No recent changes to this.   Remains on abilify 2 mg daily (was on higher doses in the past),   Continues to follow with psych  Hand tremor no longer occurring            She remains on sinemet 25/100 mg CR 2 tabs BID.  Baclofen 15 mg TID   Now on midodrine for hypotension.      MRI cspine 8/2023: Multilevel cervical degenerative disc disease as detailed with diffuse disc osteophyte complexes at the C4-5 through the C6-7 levels. Superimposed central disc protrusion at the C4-5 level causing moderate central canal narrowing and focal cord   impingement. There is no cord signal abnormality to suggest myelomalacia or edema. Moderate to severe right C4-5, right C5-6, and left C6-7 neural foraminal narrowing.     MRI brain 5/2023: Study severely degraded by patient motion artifact with limited pulse sequences obtained. No mass effect or midline shift and infarct identified. Diffuse cerebral volume loss.    Review of Systems  Review of Systems   Constitutional:  Negative for appetite change, fatigue and fever.   HENT: Negative.  Negative for hearing loss, tinnitus, trouble swallowing and voice change.     Eyes: Negative.  Negative for photophobia, pain and visual disturbance.   Respiratory: Negative.  Negative for shortness of breath.    Cardiovascular: Negative.  Negative for palpitations.   Gastrointestinal: Negative.  Negative for nausea and vomiting.   Endocrine: Negative.  Negative for cold intolerance.   Genitourinary: Negative.  Negative for dysuria, frequency and urgency.   Musculoskeletal:  Negative for back pain, gait problem, myalgias, neck pain and neck stiffness.   Skin: Negative.  Negative for rash.   Allergic/Immunologic: Negative.    Neurological:  Negative for dizziness, tremors, seizures, syncope, facial asymmetry, speech difficulty, weakness, light-headedness, numbness and headaches.   Hematological: Negative.  Does not bruise/bleed easily.   Psychiatric/Behavioral: Negative.  Negative for confusion, hallucinations and sleep disturbance   I have personally reviewed the MA's review of systems and made changes as necessary.         Objective   LMP 11/29/2009 (Exact Date)     Physical Exam  Constitutional:       General: She is awake.     Eyes:      General: Lids are normal.      Extraocular Movements: Extraocular movements intact.      Pupils: Pupils are equal, round, and reactive to light.       Neurological:      Mental Status: She is alert.      Deep Tendon Reflexes: Reflexes are normal and symmetric.     Neurological Exam  Mental Status  Awake and alert. Speech: nonverbal. Follows one-step commands.    Cranial Nerves  CN III, IV, VI: Extraocular movements intact bilaterally. Normal lids and orbits bilaterally. Pupils equal round and reactive to light bilaterally.  CN V: Facial sensation is normal.  Right: Reacts symmetrically to light touch.  Left: Reacts symmetrically to light touch.  CN VII: Full and symmetric facial movement.  CN VIII: Hearing is normal.  CN XI: Shoulder shrug strength is normal.  CN XII: Tongue midline without atrophy or fasciculations.    Motor    Moves all extremities  antigravity normal  strength b/l, intermittently follows commands so had difficulty assessing full strength but appears equal.    Sensory  Light touch is normal in upper and lower extremities.     Reflexes  Deep tendon reflexes are 2+ and symmetric in all four extremities.    Coordination    No resting tremor noted, no postural tremor,  Unable to perform CARLYLE but no significant bradykinesia. She did have spasticity in the UE along with toritcollis.       Spasticity vs patient resistance in the b/l UE    Constant dyskinesia of the mouth.   .    Gait    Patient presented in wheel chair, not ambulated due to safety concerns. .

## 2025-05-13 DIAGNOSIS — Z79.4 DIABETES MELLITUS TYPE 2, INSULIN DEPENDENT (HCC): ICD-10-CM

## 2025-05-13 DIAGNOSIS — E11.9 DIABETES MELLITUS TYPE 2, INSULIN DEPENDENT (HCC): ICD-10-CM

## 2025-05-14 ENCOUNTER — OFFICE VISIT (OUTPATIENT)
Dept: PSYCHIATRY | Facility: CLINIC | Age: 65
End: 2025-05-14
Payer: MEDICARE

## 2025-05-14 DIAGNOSIS — G80.3 DYSTONIC CEREBRAL PALSY (HCC): ICD-10-CM

## 2025-05-14 DIAGNOSIS — F31.9 BIPOLAR AFFECTIVE DISORDER, REMISSION STATUS UNSPECIFIED (HCC): ICD-10-CM

## 2025-05-14 DIAGNOSIS — F51.04 PSYCHOPHYSIOLOGICAL INSOMNIA: ICD-10-CM

## 2025-05-14 DIAGNOSIS — F41.9 ANXIETY: ICD-10-CM

## 2025-05-14 DIAGNOSIS — F99 PSYCHIATRIC DIAGNOSIS: ICD-10-CM

## 2025-05-14 DIAGNOSIS — F31.9 BIPOLAR DISORDER (HCC): ICD-10-CM

## 2025-05-14 DIAGNOSIS — R45.86 MOOD DISTURBANCE: ICD-10-CM

## 2025-05-14 DIAGNOSIS — G20.A1 PARKINSON'S DISEASE, UNSPECIFIED WHETHER DYSKINESIA PRESENT, UNSPECIFIED WHETHER MANIFESTATIONS FLUCTUATE (HCC): ICD-10-CM

## 2025-05-14 DIAGNOSIS — G80.0 SPASTIC QUADRIPLEGIC CEREBRAL PALSY (HCC): Primary | ICD-10-CM

## 2025-05-14 PROCEDURE — 90792 PSYCH DIAG EVAL W/MED SRVCS: CPT | Performed by: STUDENT IN AN ORGANIZED HEALTH CARE EDUCATION/TRAINING PROGRAM

## 2025-05-14 RX ORDER — VALPROIC ACID 250 MG/5ML
125 SOLUTION ORAL 2 TIMES DAILY
Qty: 450 ML | Refills: 1 | Status: SHIPPED | OUTPATIENT
Start: 2025-05-14

## 2025-05-14 RX ORDER — TRAZODONE HYDROCHLORIDE 100 MG/1
100 TABLET ORAL
Qty: 90 TABLET | Refills: 1 | Status: SHIPPED | OUTPATIENT
Start: 2025-05-14

## 2025-05-14 RX ORDER — ESCITALOPRAM OXALATE 10 MG/1
20 TABLET ORAL DAILY
Qty: 180 TABLET | Refills: 1 | Status: SHIPPED | OUTPATIENT
Start: 2025-05-14

## 2025-05-14 RX ORDER — LORAZEPAM 0.5 MG/1
0.5 TABLET ORAL
Qty: 90 TABLET | Refills: 1 | Status: SHIPPED | OUTPATIENT
Start: 2025-05-14 | End: 2025-11-10

## 2025-05-14 RX ORDER — ARIPIPRAZOLE 2 MG/1
2 TABLET ORAL
Qty: 90 TABLET | Refills: 1 | Status: SHIPPED | OUTPATIENT
Start: 2025-05-14

## 2025-05-14 RX ORDER — NICOTINE POLACRILEX 4 MG
LOZENGE BUCCAL
Qty: 112.5 G | Refills: 4 | Status: SHIPPED | OUTPATIENT
Start: 2025-05-14

## 2025-05-14 NOTE — ASSESSMENT & PLAN NOTE
Not yet at goal, some behaviors questionably indicating depressed mood as above  - Continue Depakene 250 mg twice daily for mood stabilization, patient today somewhat drowsy after starting new muscle relaxant so we will hold on an increase for now, but could consider in the future  -Patient's caretaker will bring in most recent labs  - Continue Lexapro 20 mg once daily for depressed mood
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
## 2025-05-15 NOTE — ASSESSMENT & PLAN NOTE
Patient continues to follow with PM&R, did have botox injections in several areas in the past with no improvement therefore was not continued. Has had new wheelchair the past year which has improved her symptoms. Increases in baclofen have been helpful for has spasticity which is improved from her prior visit with me. They are still having some difficulty with her hands-utilizing certain braces to assist. Will plan to further increase her baclofen to 20 mg TID to see if this assists, if side effects should return to prior dose. Should continue follow up with PM&R.

## 2025-05-22 DIAGNOSIS — E03.9 HYPOTHYROIDISM, UNSPECIFIED TYPE: ICD-10-CM

## 2025-05-22 DIAGNOSIS — G20.A1 PARKINSON'S DISEASE, UNSPECIFIED WHETHER DYSKINESIA PRESENT, UNSPECIFIED WHETHER MANIFESTATIONS FLUCTUATE (HCC): Primary | ICD-10-CM

## 2025-05-22 DIAGNOSIS — I95.9 HYPOTENSION, UNSPECIFIED HYPOTENSION TYPE: ICD-10-CM

## 2025-05-22 DIAGNOSIS — R32 URINARY INCONTINENCE, UNSPECIFIED TYPE: ICD-10-CM

## 2025-05-22 DIAGNOSIS — E78.5 HYPERLIPIDEMIA, UNSPECIFIED HYPERLIPIDEMIA TYPE: ICD-10-CM

## 2025-05-22 RX ORDER — SIMVASTATIN 20 MG
TABLET ORAL
Qty: 28 TABLET | Refills: 10 | Status: SHIPPED | OUTPATIENT
Start: 2025-05-22

## 2025-05-22 RX ORDER — LEVOTHYROXINE SODIUM 25 UG/1
TABLET ORAL
Qty: 30 TABLET | Refills: 10 | Status: SHIPPED | OUTPATIENT
Start: 2025-05-22

## 2025-05-22 RX ORDER — OXYBUTYNIN CHLORIDE 5 MG/1
TABLET ORAL
Qty: 84 TABLET | Refills: 10 | Status: SHIPPED | OUTPATIENT
Start: 2025-05-22

## 2025-05-22 RX ORDER — CARBIDOPA AND LEVODOPA 50; 200 MG/1; MG/1
TABLET, EXTENDED RELEASE ORAL
Qty: 56 TABLET | Refills: 10 | Status: SHIPPED | OUTPATIENT
Start: 2025-05-22

## 2025-05-23 ENCOUNTER — TELEPHONE (OUTPATIENT)
Dept: FAMILY MEDICINE CLINIC | Facility: CLINIC | Age: 65
End: 2025-05-23

## 2025-05-23 RX ORDER — MIDODRINE HYDROCHLORIDE 5 MG/1
TABLET ORAL
Qty: 60 TABLET | Refills: 10 | Status: SHIPPED | OUTPATIENT
Start: 2025-05-23

## 2025-05-23 NOTE — TELEPHONE ENCOUNTER
Folder (To be completed by) -Dr. Fernando     Name of Form - [facility] #[id number]        Form to be Faxed [phone]    Patient was made aware of the 7-10 business day form policy.

## 2025-06-02 ENCOUNTER — TELEPHONE (OUTPATIENT)
Dept: ENDOCRINOLOGY | Facility: CLINIC | Age: 65
End: 2025-06-02

## 2025-06-02 DIAGNOSIS — E11.9 DIABETES MELLITUS TYPE 2, INSULIN DEPENDENT (HCC): ICD-10-CM

## 2025-06-02 DIAGNOSIS — Z79.4 DIABETES MELLITUS TYPE 2, INSULIN DEPENDENT (HCC): ICD-10-CM

## 2025-06-03 ENCOUNTER — APPOINTMENT (OUTPATIENT)
Dept: LAB | Facility: CLINIC | Age: 65
End: 2025-06-03
Payer: MEDICARE

## 2025-06-03 ENCOUNTER — OFFICE VISIT (OUTPATIENT)
Dept: FAMILY MEDICINE CLINIC | Facility: CLINIC | Age: 65
End: 2025-06-03

## 2025-06-03 VITALS
SYSTOLIC BLOOD PRESSURE: 113 MMHG | DIASTOLIC BLOOD PRESSURE: 72 MMHG | HEART RATE: 67 BPM | RESPIRATION RATE: 20 BRPM | TEMPERATURE: 97.2 F | OXYGEN SATURATION: 94 %

## 2025-06-03 DIAGNOSIS — G82.50 SPASTIC QUADRIPARESIS (HCC): ICD-10-CM

## 2025-06-03 DIAGNOSIS — F99 PSYCHIATRIC DIAGNOSIS: ICD-10-CM

## 2025-06-03 DIAGNOSIS — M81.0 OSTEOPOROSIS, UNSPECIFIED OSTEOPOROSIS TYPE, UNSPECIFIED PATHOLOGICAL FRACTURE PRESENCE: ICD-10-CM

## 2025-06-03 DIAGNOSIS — G80.3 DYSTONIC CEREBRAL PALSY (HCC): ICD-10-CM

## 2025-06-03 DIAGNOSIS — Z11.1 SCREENING-PULMONARY TB: ICD-10-CM

## 2025-06-03 DIAGNOSIS — F31.9 BIPOLAR AFFECTIVE DISORDER, REMISSION STATUS UNSPECIFIED (HCC): ICD-10-CM

## 2025-06-03 DIAGNOSIS — R68.89 COMPLAINT ASSOCIATED WITH GASTRIC TUBE (HCC): ICD-10-CM

## 2025-06-03 DIAGNOSIS — Z93.1 COMPLAINT ASSOCIATED WITH GASTRIC TUBE (HCC): ICD-10-CM

## 2025-06-03 DIAGNOSIS — E11.9 DIABETES MELLITUS TYPE 2, INSULIN DEPENDENT (HCC): Primary | ICD-10-CM

## 2025-06-03 DIAGNOSIS — E11.9 DIABETES MELLITUS TYPE 2, INSULIN DEPENDENT (HCC): ICD-10-CM

## 2025-06-03 DIAGNOSIS — Z79.4 DIABETES MELLITUS TYPE 2, INSULIN DEPENDENT (HCC): ICD-10-CM

## 2025-06-03 DIAGNOSIS — Z79.4 DIABETES MELLITUS TYPE 2, INSULIN DEPENDENT (HCC): Primary | ICD-10-CM

## 2025-06-03 LAB — VALPROATE SERPL-MCNC: 29 UG/ML (ref 50–125)

## 2025-06-03 PROCEDURE — 36415 COLL VENOUS BLD VENIPUNCTURE: CPT

## 2025-06-03 PROCEDURE — 86480 TB TEST CELL IMMUN MEASURE: CPT

## 2025-06-03 PROCEDURE — G2211 COMPLEX E/M VISIT ADD ON: HCPCS | Performed by: FAMILY MEDICINE

## 2025-06-03 PROCEDURE — 80164 ASSAY DIPROPYLACETIC ACD TOT: CPT

## 2025-06-03 PROCEDURE — 99214 OFFICE O/P EST MOD 30 MIN: CPT | Performed by: FAMILY MEDICINE

## 2025-06-03 RX ORDER — NYSTATIN 100000 [USP'U]/G
POWDER TOPICAL 2 TIMES DAILY
Qty: 60 G | Refills: 3 | Status: SHIPPED | OUTPATIENT
Start: 2025-06-03

## 2025-06-03 NOTE — ASSESSMENT & PLAN NOTE
- Caregiver endorses no new symptoms related to osteoporosis, next Prolia injection due July 2025. Order placed and caregiver to  medication and administer per schedule.   Orders:  •  Calcium, ionized; Future  •  denosumab (PROLIA) 60 mg/mL; Inject 1 mL (60 mg total) under the skin once for 1 dose

## 2025-06-03 NOTE — ASSESSMENT & PLAN NOTE
- Patient with chronic history of spasticity, following with neurology.   - Neurology recently increased dose of baclofen 2/2 increased spasticity s/p hospitalization in the fall of 2024. Caregiver notes improvement with hand clinching.     Plan:   - Continue to work with PT/OT. Continues to wear splints at night.   Orders:  •  CK; Future  •  Comprehensive metabolic panel; Future

## 2025-06-03 NOTE — ASSESSMENT & PLAN NOTE
- Caregiver was noticing hypoglycemic episodes in the morning post sliding scale and in the evening associated with PM care. BG log reviewed with caregiver.   - Endocrinology decreased the basal insulin with no change with SSI coverage.Caregiver has noticed improvement in BG and this is supported by records   - Endocrinology managing insulin at this time.     Lab Results   Component Value Date    HGBA1C 7.4 (H) 03/24/2025     Plan:   - F/u with A1c no sooner than June   - Caregiver to call endocrinology for insulin changes.   - Maintain home supply of glucose and glucagon for hypoglycemia episodes.   Orders:  •  Comprehensive metabolic panel; Future

## 2025-06-03 NOTE — ASSESSMENT & PLAN NOTE
- Patient has G tube managed by GI. Recently had tube change and presents with no acute changes today. Caregiver endorses occasional redness/excoriation around tube insertion site.     Plan:  - PRN nystatin powder ordered. Caregiver instructions given for administration and use.   - Caregiver to follow up with GI with any G tube complications or issues.   Orders:  •  nystatin (MYCOSTATIN) powder; Apply topically 2 (two) times a day

## 2025-06-03 NOTE — PROGRESS NOTES
Name: Terrie Alicea      : 1960      MRN: [id number]  Encounter Provider: Mirta Fernando MD  Encounter Date: 6/3/2025   Encounter department: [facility] [place]  :  Assessment & Plan  Diabetes mellitus type 2, insulin dependent (HCC)  - Caregiver was noticing hypoglycemic episodes in the morning post sliding scale and in the evening associated with PM care. BG log reviewed with caregiver.   - Endocrinology decreased the basal insulin with no change with SSI coverage.Caregiver has noticed improvement in BG and this is supported by records   - Endocrinology managing insulin at this time.     Lab Results   Component Value Date    HGBA1C 7.4 (H) 2025     Plan:   - F/u with A1c no sooner than    - Caregiver to call endocrinology for insulin changes.   - Maintain home supply of glucose and glucagon for hypoglycemia episodes.   Orders:  •  Comprehensive metabolic panel; Future    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HCC)  - Patient with chronic history of spasticity, following with neurology.   - Neurology recently increased dose of baclofen 2/2 increased spasticity s/p hospitalization in the fall of . Caregiver notes improvement with hand clinching.     Plan:   - Continue to work with PT/OT. Continues to wear splints at night.   Orders:  •  CK; Future  •  Comprehensive metabolic panel; Future    Bipolar affective disorder, remission status unspecified ([facility])    Orders:  •  Valproic acid level, total; Future    Osteoporosis, unspecified osteoporosis type, unspecified pathological fracture presence  - Caregiver endorses no new symptoms related to osteoporosis, next Prolia injection due 2025. Order placed and caregiver to  medication and administer per schedule.   Orders:  •  Calcium, ionized; Future  •  denosumab (PROLIA) 60 mg/mL; Inject 1 mL (60 mg total) under the skin once for 1 dose    Mood disturbances  - Patient is stable on current regimen that is mamaged by  pyschology. Patient is establishing care with [facility].     Orders:  •  Bilirubin, direct; Future  •  Valproic acid level, total; Future    Dystonic cerebral palsy (HCC)    Orders:  •  CK; Future    Complaint associated with gastric tube (HCC)  - Patient has G tube managed by GI. Recently had tube change and presents with no acute changes today. Caregiver endorses occasional redness/excoriation around tube insertion site.     Plan:  - PRN nystatin powder ordered. Caregiver instructions given for administration and use.   - Caregiver to follow up with GI with any G tube complications or issues.   Orders:  •  nystatin (MYCOSTATIN) powder; Apply topically 2 (two) times a day           History of Present Illness   Patient is a 65 y/o F PMHx significant for cerebral palsy, hypotension, T2DM presenting to clinic for follow up. Patient is nonverbal, accompanied by caregiver who provides history. Terrie is overall doing well, no acute concerns raised today. She is currently following with endocrinology, cardiology, ophthalmology, and podiatry. Mentation has been baseline. She continues to receive tube feeds overnight with daytime pleasure feeds as desired. Caregivers are measuring BP at home, SBP ranging mostly in  range. She continues to receive midodrine 2x daily as directed by cardiology and has not been reported to experience any symptomatic hypotensive episodes. She is following with endocrinology for diabetes management and has recently begun using the Eduardo 3 CGM. BG has been stable with some recent adjustments of basal dose made by endocrinology. Caregiver has supply of glucagon and does not need refills today. Caregiver notes occasional minor redness/excoriation around G tube site and request nystatin powder PRN.       Review of Systems   Reason unable to perform ROS: Patient is nonverbal, unable to complete ROS.       Objective   /72 (BP Location: Left arm, Patient Position: Sitting, Cuff Size:  Standard)   Pulse 67   Temp (!) 97.2 °F (36.2 °C)   Resp 20   LMP 11/29/2009 (Exact Date)   SpO2 94%      Physical Exam  Constitutional:       General: She is not in acute distress.  HENT:      Head: Normocephalic.      Nose: Nose normal.      Mouth/Throat:      Mouth: Mucous membranes are moist.     Cardiovascular:      Rate and Rhythm: Normal rate and regular rhythm.   Pulmonary:      Effort: Pulmonary effort is normal.      Breath sounds: Normal breath sounds.   Abdominal:      General: Abdomen is flat. Bowel sounds are normal.      Palpations: Abdomen is soft.      Comments: G tube site assessed, C/D/I      Musculoskeletal:      Cervical back: Normal range of motion.     Skin:     General: Skin is warm and dry.     Neurological:      Mental Status: She is alert. Mental status is at baseline.      Motor: Atrophy and abnormal muscle tone present.     Psychiatric:      Comments: Lip smacking/tardive dyskinesia is at baseline per caregiver

## 2025-06-03 NOTE — ASSESSMENT & PLAN NOTE
- Patient is stable on current regimen that is mamaged by pyschology. Patient is establishing care with [facility].     Orders:  •  Bilirubin, direct; Future  •  Valproic acid level, total; Future

## 2025-06-04 LAB
GAMMA INTERFERON BACKGROUND BLD IA-ACNC: 0.05 IU/ML
M TB IFN-G BLD-IMP: NEGATIVE
M TB IFN-G CD4+ BCKGRND COR BLD-ACNC: 0.02 IU/ML
M TB IFN-G CD4+ BCKGRND COR BLD-ACNC: 0.02 IU/ML
MITOGEN IGNF BCKGRD COR BLD-ACNC: 9.95 IU/ML

## 2025-06-05 ENCOUNTER — RESULTS FOLLOW-UP (OUTPATIENT)
Dept: FAMILY MEDICINE CLINIC | Facility: CLINIC | Age: 65
End: 2025-06-05

## 2025-06-12 ENCOUNTER — RESULTS FOLLOW-UP (OUTPATIENT)
Dept: FAMILY MEDICINE CLINIC | Facility: CLINIC | Age: 65
End: 2025-06-12

## 2025-06-13 ENCOUNTER — TELEPHONE (OUTPATIENT)
Dept: FAMILY MEDICINE CLINIC | Facility: CLINIC | Age: 65
End: 2025-06-13

## 2025-06-13 DIAGNOSIS — Z93.1 COMPLAINT ASSOCIATED WITH GASTRIC TUBE (HCC): ICD-10-CM

## 2025-06-13 DIAGNOSIS — R68.89 COMPLAINT ASSOCIATED WITH GASTRIC TUBE (HCC): ICD-10-CM

## 2025-06-13 NOTE — PSYCH
MEDICATION MANAGEMENT NOTE        [facility]      Name and Date of Birth:  Terrie Alicea 65 y.o. 1960 MRN: [id number]    Date of Visit: Dawn 15, 2025    Subjective:    Patient was seen for follow-up examination and the chart was reviewed. Patient today was seen with Anna her behavioral specialist in the , and was seen again with Lisa.  They discussed today that patient's screaming and crying behaviors have vastly improved since I last saw her about a month ago.  Notably at that appointment she had just been placed on a larger dose of the baclofen to help with her spasticity which seems to have improved somewhat.  This episode seemed to go on for about a month or so.  She does have a history of manic episodes with her bipolar disorder and they believe that she, at some point, was not sleeping for about 3 days.  We discussed at length what to do with her medications.  Discussed that if she has another such episode we will discuss more potentially increasing her Depakene, she was previously on a dose of 500 mg twice daily.  We reviewed that her Depakote is low but that this is okay if clinically she is getting some benefit from the Depakene.    Otherwise, no other concerns and we will continue her current medications and meet again in about a month.    No other side effects noted    Psychiatric Review Of Systems:     Sleep changes: no  Appetite changes: no  Auditory hallucinations: no  Visual hallucinations: no  Delusional thinking: no    Suicidal ideation: no  Homicidal ideation: no    Medical Review Of Systems:    Constitutional negative   ENT negative   Cardiovascular negative   Respiratory negative   Gastrointestinal negative   Genitourinary negative   Musculoskeletal negative   Integumentary negative   Neurological negative   Endocrine negative   Other Symptoms none, all other systems are negative       Objective:    Vital signs in last  24 hours:    There were no vitals filed for this visit.  91 lbs over the weekend       Mental Status Evaluation:    Appearance age appropriate, casually dressed   Behavior cooperative, calm   Speech Patient is nonverbal also could not assess   Mood Patient is nonverbal so could not assess   Affect Blunted   Thought Processes Patient is nonverbal so could not assess   Associations Patient is nonverbal so could not assess   Thought Content Patient is nonverbal so could not assess   Perceptual Disturbances: does not appear responding to internal stimuli   Abnormal Thoughts  Risk Potential Suicidal ideation - None  Homicidal ideation - None  Potential for aggression - yes, due to history of such   Orientation Patient is nonverbal so could not assess   Memory Patient is nonverbal so could not assess   Consciousness alert and awake   Attention Span Concentration Span poor attention span  poor concentration   Intellect Not formerly assessed   Insight Patient is nonverbal so could not assess   Judgement Patient is nonverbal so could not assess   Language Patient is nonverbal   Fund of Knowledge Patient is nonverbal so could not assess       Laboratory Results: I have reviewed the following laboratory results.    CBC:   Lab Results   Component Value Date    WBC 10.03 12/20/2024    RBC 3.52 (L) 12/20/2024    HGB 11.4 (L) 12/20/2024    HCT 35.7 12/20/2024     (H) 12/20/2024     12/20/2024    MCH 32.4 12/20/2024    MCHC 31.9 12/20/2024    RDW 13.2 12/20/2024    MPV 12.5 12/20/2024    NEUTROABS 6.92 12/20/2024    TOTANEUTABS 7.79 (H) 10/29/2024     BMP:   Lab Results   Component Value Date    SODIUM 143 03/24/2025    K 4.5 03/24/2025     03/24/2025    CO2 35 (H) 03/24/2025    AGAP 6 03/24/2025    BUN 35 (H) 03/24/2025    CREATININE 0.97 03/24/2025    GLUC 113 03/24/2025    GLUF 171 (H) 01/08/2025    CALCIUM 9.9 03/24/2025    EGFR 62 03/24/2025     CMP:   Lab Results   Component Value Date    SODIUM 143  03/24/2025    K 4.5 03/24/2025     03/24/2025    CO2 35 (H) 03/24/2025    AGAP 6 03/24/2025    BUN 35 (H) 03/24/2025    CREATININE 0.97 03/24/2025    GLUC 113 03/24/2025    GLUF 171 (H) 01/08/2025    CALCIUM 9.9 03/24/2025    AST 18 02/05/2025    ALT 13 02/05/2025    ALKPHOS 73 02/05/2025    TP 8.4 02/05/2025    ALB 4.3 02/05/2025    TBILI 0.25 02/05/2025    EGFR 62 03/24/2025     Lipid Profile:   Lab Results   Component Value Date    CHOLESTEROL 177 12/20/2024    HDL 54 12/20/2024    TRIG 124 12/20/2024    LDLCALC 98 12/20/2024    NONHDLC 116 09/14/2022     Hemoglobin A1C:   Lab Results   Component Value Date    HGBA1C 7.4 (H) 03/24/2025     03/24/2025     Liver Enzymes:   Lab Results   Component Value Date    AST 18 02/05/2025    ALT 13 02/05/2025    ALKPHOS 73 02/05/2025    PROT 7.0 04/06/2016    BILITOT 0.1 (L) 04/06/2016     Thyroid Studies:   Lab Results   Component Value Date    AMF2FYMWSOXP 2.764 12/20/2024    T3FREE 1.05 (L) 01/31/2016    FREET4 1.08 10/18/2024    M5LBFHN 0.90 03/07/2019    E3FWKCY 9.5 03/07/2019     Vitamin D Level   Lab Results   Component Value Date    PHIN03ALBNSR 76.1 01/08/2025     Vitamin B12   Lab Results   Component Value Date    SFNJTJZE72 1,190 (H) 11/14/2024     EKG   Lab Results   Component Value Date    VENTRATE 91 11/25/2024    ATRIALRATE 91 11/25/2024    PRINT 128 11/25/2024    QRSDINT 78 11/25/2024    QTINT 346 11/25/2024    PAXIS -14 11/25/2024    QRSAXIS 56 11/25/2024    TWAVEAXIS 40 11/25/2024     1 Result Note       View Follow-Up Encounter            Component  Ref Range & Units (hover) 6/3/25 11:24 AM 2/2/24  9:28 AM 9/14/22  7:33 AM 10/13/21  1:07 PM 10/21/20  8:43 AM 8/1/20 10:06 AM 3/7/19  9:47 AM   Valproic Acid, Total 29 Low  39 Low  R 45 Low  R 46 Low  R 44 Low  R 96 R 48 Low  R          Suicide/Homicide Risk Assessment:    Risk of Harm to Self:  The following ratings are based on assessment at the time of the interview  Demographic risk factors  include:   Historical Risk Factors include: history of mood disorder  Recent Specific Risk Factors include: current possible depressive symptoms, unclear  Protective Factors: access to mental health treatment, compliant with medications, compliant with mental health treatment, restricted access to lethal means, stable living environment, patient lives in a group setting and has caretaker, very well-supported  Weapons: none. The following steps have been taken to ensure weapons are properly secured: not applicable  Based on today's assessment, Terrie presents the following risk of harm to self: minimal     Risk of Harm to Others:  The following ratings are based on assessment at the time of the interview  Demographic Risk Factors include: none.  Historical Risk Factors include: history of aggression towards others in setting of intellectual disability and CP  Recent Specific Risk Factors include: none.  Protective Factors: access to mental health treatment, compliant with medications, compliant with mental health treatment, restricted access to lethal means, stable living environment, patient lives in a group setting and has caretaker, very well-supported  Weapons: none. The following steps have been taken to ensure weapons are properly secured: not applicable  Based on today's assessmentTerrie presents the following risk of harm to others: minimal       The following interventions are recommended: continue medication management      Assessment/Plan:      Diagnoses and all orders for this visit:    Spastic quadriplegic cerebral palsy (HCC)    Dystonic cerebral palsy (HCC)    Bipolar affective disorder, remission status unspecified (HCC)    Anxiety    Mood disturbances    Psychophysiological insomnia          Assessment & Plan  Spastic quadriplegic cerebral palsy (HCC)            Dystonic cerebral palsy (HCC)            Bipolar affective disorder, remission status unspecified (HCC)  Improved with outbursts  consisting of screaming/yelling and crying - will continue to monitor  - Continue Depakene 250 mg twice daily for mood stabilization, could consider increase if needed  -Patient's caretaker will bring in most recent labs  - Continue Lexapro 20 mg once daily for depressed mood             Anxiety            Mood disturbances            Psychophysiological insomnia  At goal  - Continue trazodone 100 mg at nighttime for insomnia  -Continue Ativan 0.5 mg at bedtime for anxiety and insomnia, patient takes every night          Mood disturbance                    Patient to follow-up in 1 month.   Aware of 24 hour and weekend coverage for urgent situations accessed by calling [facility] main practice number    Medications Risks/Benefits:    Risks, Benefits And Possible Side Effects Of Medications:  Risks, benefits, and possible side effects of medications explained to Terrie and she verbalizes understanding and agreement for treatment.    Controlled Medication Discussion:   Terrie has been filling controlled prescriptions on time as prescribed according to [place] Prescription Drug Monitoring Program    Treatment Plan:  Completed and signed during the session: Yes - with patient's staff and Terrie    This note was not shared with the patient due to this is a psychotherapy note     Visit Time  Visit Start Time: 1:00 PM  Visit Stop Time: 1:30 PM  Total Visit Duration: 30 minutes    Neelam Franco MD 06/15/25    This note has been constructed in part using a voice recognition system.   There may be translation, syntax, or grammatical errors. If you have any questions, please contact the dictating provider.

## 2025-06-13 NOTE — TELEPHONE ENCOUNTER
Caregiver called and asked if you can you put in a new prn order in for:    nystatin (MYCOSTATIN) powder

## 2025-06-15 NOTE — ASSESSMENT & PLAN NOTE
Improved with outbursts consisting of screaming/yelling and crying - will continue to monitor  - Continue Depakene 250 mg twice daily for mood stabilization, could consider increase if needed  -Patient's caretaker will bring in most recent labs  - Continue Lexapro 20 mg once daily for depressed mood

## 2025-06-16 ENCOUNTER — OFFICE VISIT (OUTPATIENT)
Dept: PSYCHIATRY | Facility: CLINIC | Age: 65
End: 2025-06-16
Payer: MEDICARE

## 2025-06-16 DIAGNOSIS — F41.9 ANXIETY: ICD-10-CM

## 2025-06-16 DIAGNOSIS — R45.86 MOOD DISTURBANCE: ICD-10-CM

## 2025-06-16 DIAGNOSIS — F99 PSYCHIATRIC DIAGNOSIS: ICD-10-CM

## 2025-06-16 DIAGNOSIS — G80.3 DYSTONIC CEREBRAL PALSY (HCC): ICD-10-CM

## 2025-06-16 DIAGNOSIS — G80.0 SPASTIC QUADRIPLEGIC CEREBRAL PALSY (HCC): Primary | ICD-10-CM

## 2025-06-16 DIAGNOSIS — F31.9 BIPOLAR AFFECTIVE DISORDER, REMISSION STATUS UNSPECIFIED (HCC): ICD-10-CM

## 2025-06-16 DIAGNOSIS — F51.04 PSYCHOPHYSIOLOGICAL INSOMNIA: ICD-10-CM

## 2025-06-16 PROCEDURE — 99214 OFFICE O/P EST MOD 30 MIN: CPT | Performed by: STUDENT IN AN ORGANIZED HEALTH CARE EDUCATION/TRAINING PROGRAM

## 2025-06-16 RX ORDER — NYSTATIN 100000 [USP'U]/G
POWDER TOPICAL 2 TIMES DAILY PRN
Qty: 60 G | Refills: 3 | Status: SHIPPED | OUTPATIENT
Start: 2025-06-16

## 2025-06-16 NOTE — BH TREATMENT PLAN
TREATMENT PLAN (Medication Management Only)        [facility]    Name/Date of Birth/MRN:  Terrie Alicea 65 y.o. 1960 MRN: [id number]  Date of Treatment Plan: June 16, 2025  Diagnosis/Diagnoses:   1. Spastic quadriplegic cerebral palsy (HCC)    2. Dystonic cerebral palsy (HCC)    3. Bipolar affective disorder, remission status unspecified (HCC)    4. Anxiety    5. Mood disturbances    6. Psychophysiological insomnia    7. Mood disturbance      Strengths/Personal Resources for Self-Care: supportive staff, taking medications as prescribed.  Area/Areas of need (in own words): maintain control of mood  1. Long Term Goal:   maintain control of mood  Target Date: 6 months - 12/16/2025  Person/Persons responsible for completion of goal: Terrie  2.  Short Term Objective (s) - How will we reach this goal?:   A.  Provider new recommended medication/dosage changes and/or continue medication(s): continue current medications as prescribed.  B.  N/A.  C.  N/A.  Target Date: 3 months - 9/16/2025  Person/Persons Responsible for Completion of Goal: Terrie and facility staff   Progress Towards Goals: continuing treatment  Treatment Modality: medication management every 1-3 months  Review due 180 days from date of this plan: 6 months - 12/16/2025  Expected length of service: ongoing treatment unless revised  My Physician/PA/NP and I have developed this plan together and I agree to work on the goals and objectives. I understand the treatment goals that were developed for my treatment.  Patient gave verbal consent and agreement to this treatment plan.   Electronic Signatures: on file (unless signed below)    Neelam Franco MD 06/16/25

## 2025-06-17 ENCOUNTER — OFFICE VISIT (OUTPATIENT)
Dept: UROLOGY | Facility: CLINIC | Age: 65
End: 2025-06-17
Payer: MEDICARE

## 2025-06-17 VITALS — SYSTOLIC BLOOD PRESSURE: 114 MMHG | OXYGEN SATURATION: 96 % | DIASTOLIC BLOOD PRESSURE: 60 MMHG | HEART RATE: 64 BPM

## 2025-06-17 DIAGNOSIS — N39.0 RECURRENT UTI: ICD-10-CM

## 2025-06-17 PROCEDURE — 99213 OFFICE O/P EST LOW 20 MIN: CPT

## 2025-06-17 RX ORDER — METHENAMINE HIPPURATE 1000 MG/1
TABLET ORAL
Qty: 60 TABLET | Refills: 10 | Status: SHIPPED | OUTPATIENT
Start: 2025-06-17 | End: 2025-06-17

## 2025-06-17 RX ORDER — METHENAMINE HIPPURATE 1000 MG/1
TABLET ORAL
Qty: 60 TABLET | Refills: 12 | Status: SHIPPED | OUTPATIENT
Start: 2025-06-17

## 2025-06-17 RX ORDER — MULTIVIT WITH MINERALS/LUTEIN
1000 TABLET ORAL DAILY
Qty: 90 TABLET | Refills: 3 | Status: SHIPPED | OUTPATIENT
Start: 2025-06-17

## 2025-06-17 NOTE — PROGRESS NOTES
Name: Terrie Alicea      : 1960      MRN: [id number]  Encounter Provider: LEATHA Carrero  Encounter Date: 2025   Encounter department: [facility]  :  Assessment & Plan  Recurrent UTI  -Continue hiprex with vitamin C for UTI prevention.  Rx refills sent to pharmacy.  - She has not had any recent UTIs and is doing very well.  -Return in 1 year for follow-up, or sooner if needed.  All questions addressed.  Please do not hesitate to reach out with further questions or concerns.  Orders:    methenamine hippurate (HIPREX) 1 g tablet; Crush 1 tablet two times a day per peg tube    Ascorbic Acid (vitamin C) 1000 MG tablet; 1 tablet (1,000 mg total) by Per G Tube route daily        History of Present Illness   Terrie Alicea is a 65 y.o. female who presents here follow-up of recurrent UTIs.  Patient previously seen by me in 2025.  Previously a patient of Dr. Fontenot in 2016. She has a past medical history of cerebral palsy, severe intellectual disability and developmental delay, spastic quadriparesis, parkinsonism, type 2 DM, hypothyroidism, iron deficiency anemia, osteoporosis, dysphagia, sigmoid volvulus, bipolar disorder, anxiety, and urinary incontinence.      She presents with her caregiver, who has taken care of her for the last 17 years. She resides in a group home.      She was hospitalized in May 2024 with altered mental status and sepsis d/t gram-negative UTI, and SCAR.  She was also hospitalized more recently in 2020 for for sepsis and E. coli positive UTI.  Patient previously was on 50 mg of Macrobid and at our last visit we had discontinued Macrobid and put her on Hiprex and vitamin C.       Patient did have a CT chest abdomen pelvis with contrast on 10-29-24 showing no hydronephrosis or urinary tract calculi simple bilateral renal cysts.    She is doing well.  She has not had any recent infections.  She is taking Hiprex and daily vitamin C.       "  Review of Systems   Reason unable to perform ROS: Nonverbal.          Objective   LMP 11/29/2009 (Exact Date)     Physical Exam  Vitals reviewed.   Constitutional:       General: She is not in acute distress.     Appearance: She is well-developed.   HENT:      Head: Normocephalic and atraumatic.     Eyes:      Conjunctiva/sclera: Conjunctivae normal.     Pulmonary:      Effort: Pulmonary effort is normal. No respiratory distress.     Neurological:      Mental Status: She is alert.      Gait: Gait abnormal.      Comments: In wheelchair        Results   No results found for: \"PSA\"  Lab Results   Component Value Date    GLUCOSE 113 01/31/2016    CALCIUM 9.9 03/24/2025     04/06/2016    K 4.5 03/24/2025    CO2 35 (H) 03/24/2025     03/24/2025    BUN 35 (H) 03/24/2025    CREATININE 0.97 03/24/2025     Lab Results   Component Value Date    WBC 10.03 12/20/2024    HGB 11.4 (L) 12/20/2024    HCT 35.7 12/20/2024     (H) 12/20/2024     12/20/2024       Office Urine Dip  No results found for this or any previous visit (from the past hour).      "

## 2025-06-18 ENCOUNTER — PROCEDURE VISIT (OUTPATIENT)
Dept: PODIATRY | Facility: CLINIC | Age: 65
End: 2025-06-18
Payer: MEDICARE

## 2025-06-18 DIAGNOSIS — M79.674 PAIN IN TOES OF BOTH FEET: ICD-10-CM

## 2025-06-18 DIAGNOSIS — M79.674 PAIN OF RIGHT GREAT TOE: ICD-10-CM

## 2025-06-18 DIAGNOSIS — Z79.4 DIABETES MELLITUS TYPE 2, INSULIN DEPENDENT (HCC): Primary | ICD-10-CM

## 2025-06-18 DIAGNOSIS — M79.675 PAIN IN TOES OF BOTH FEET: ICD-10-CM

## 2025-06-18 DIAGNOSIS — T14.8XXA BLOOD BLISTER: ICD-10-CM

## 2025-06-18 DIAGNOSIS — B35.1 ONYCHOMYCOSIS: ICD-10-CM

## 2025-06-18 DIAGNOSIS — R60.1 GENERALIZED EDEMA: ICD-10-CM

## 2025-06-18 DIAGNOSIS — S90.111A CONTUSION OF RIGHT GREAT TOE WITHOUT DAMAGE TO NAIL, INITIAL ENCOUNTER: ICD-10-CM

## 2025-06-18 DIAGNOSIS — E11.9 DIABETES MELLITUS TYPE 2, INSULIN DEPENDENT (HCC): Primary | ICD-10-CM

## 2025-06-18 PROCEDURE — 11720 DEBRIDE NAIL 1-5: CPT | Performed by: PODIATRIST

## 2025-06-18 PROCEDURE — 99212 OFFICE O/P EST SF 10 MIN: CPT | Performed by: PODIATRIST

## 2025-06-18 NOTE — PROGRESS NOTES
Name: Terrie Alicea      : 1960      MRN: [id number]  Encounter Provider: Manolo Eubanks DPM  Encounter Date: 2025   Encounter department: [facility]ODIAT[facility] [place]  :  Assessment & Plan  Onychomycosis       Debride mycotic nails and thin the nail plates x 2 with the use of a nail nipper manually and an electric Dremel bur was used to reduce the thickness of the nail beds and smoothed the distal aspect of the nails.   Contusion of right great toe without damage to nail, initial encounter         Diabetes mellitus type 2, insulin dependent (HCC)    Lab Results   Component Value Date    HGBA1C 7.4 (H) 2025            Generalized edema         Blood blister       Lanced the blood blister on the distal aspect of the toe underneath the nail with the use of a nail nipper and expressed a fair amount of blood from within the blister.  Cover the area with triple antibiotic and a Band-Aid and instructed the staff to do the same.  Pain of right great toe         Pain in toes of both feet         Reviewed her last primary care doctor visit note from 6/3/2025.    Discussed proper shoe gear, daily inspections of feet, and general foot health with patient. Patient has Q8  findings and is recommended for at risk foot care every 9-10 weeks.    Patients most recent complete clinical foot exam was on: 2025    Return in about 10 weeks (around 2025).     History of Present Illness   HPI  Terrie Alicea is a 65 y.o. female who presents with chief complaint of painful thick nails on both feet and a dark discoloration to her right big toe.  Staff states that the patient hit the toe somewhere at some point and has been fairly recent.  Patient was seen in her wheelchair.  She is a diabetic whose last A1c was 7.4 dated 3/24/2025.  Patient presents for at-risk foot care.  Patient has no acute concerns today.  Patient has significant lower extremity risk due to neuropathy, parasthesia, edema, and trophic  skin changes to the lower extremity.   History obtained from: patient's Legal Guardian and patient's caregiver    Review of Systems  Medical History Reviewed by provider this encounter:     .  Medications Ordered Prior to Encounter[1]   Social History[2]     Objective   LMP 11/29/2009 (Exact Date)      Physical Exam  Vascular status is a faint 1/4 DP PT negative digital hair, normal distal cooling, immediate capillary refill bilaterally.  Capillary refill is approximately 2 to 3 seconds.  Mild amount of edema is present bilaterally     Derm nails are brittle elongated hypertrophic white-yellow discoloration with subungual debris x 2.  There is an increased thickness in the nails of approximately 1 to 2 mm.  There is loss of turgor noted bilaterally and thinning of the skin also seen bilaterally.  There is an ecchymotic spot that is fading on the dorsal aspect of the right foot.  Measures approximately 2 cm x 2 cm.  The right hallux nail has a blood blister present underneath the nail secondary to a contusion.  The blister is filled with old blood no signs of any infection there is slight pain with palpation of the toes noted by withdrawal of the foot.  The right hallux nail appears to be well adhered to the nailbed but lifted in the area of the blister.  The blood that is present is still bright red so fairly new blister had formed.       [1]   Current Outpatient Medications on File Prior to Visit   Medication Sig Dispense Refill    Alcohol Swabs 70 % PADS May substitute brand based on insurance coverage. Check glucose TID. 100 each 0    ARIPiprazole (ABILIFY) 2 mg tablet 1 tablet (2 mg total) by Per G Tube route daily at bedtime 90 tablet 1    Ascorbic Acid (vitamin C) 1000 MG tablet 1 tablet (1,000 mg total) by Per G Tube route daily 90 tablet 3    baclofen 10 mg tablet TAKE 2 TABLETS BY MOUTH  THREE TIMES A DAY FOR SPASTICITY 180 tablet 10    Blood Glucose Monitoring Suppl (OneTouch Verio Reflect) w/Device KIT  May substitute brand based on insurance coverage. Check glucose TID. 1 kit 0    carbidopa-levodopa (SINEMET CR)  mg per tablet TAKE 1 TABLET VIA G-TUBE TWICE A DAY FOR PARKINSONS DISEASE 56 tablet 10    Continuous Glucose  (FreeStyle Eduardo 3 [place]) SHAI Use with freestyle eduardo 3+ sensor to monitor blood glucose levels continuously 1 each 0    Continuous Glucose Sensor (FreeStyle Eduardo 3 Plus Sensor) MISC Use to monitor blood glucose levels continuously. 2 each 11    denosumab (PROLIA) 60 mg/mL Inject 1 mL (60 mg total) under the skin once for 1 dose      Dextrose, Diabetic Use, 15 GM/33GM GEL Take 1 Dose by mouth daily as needed (Please give as needed for blood sugars between 61 and 80 and recheck blood sugar in 20 minutes) 33 g 6    escitalopram (LEXAPRO) 10 mg tablet Take 2 tablets (20 mg total) by mouth daily 180 tablet 1    Ferrous Sulfate 300 (60 Fe) mg/5 mL solution 5 mL (300 mg total) by Per G Tube route every other day 38 mL 5    Glucagon (Gvoke HypoPen 2-Pack) 0.5 MG/0.1ML SOAJ Inject 1 mg under the skin every 15 (fifteen) minutes as needed (for symptomatic blood glucose <60 and/or if patient is unconcious or seizing) Call 911 after use of pen and then notify Endocrinology provider 0.2 mL 0    glucose (Glutose 15) 40 % DISSOLVE IN WARM WATER AND ADMINISTER VIA PEG TUBE AS NEEDED FOR ASYMPTOMATIC FOR BLOOD SUGAR LESS THAN 60 MG/DL AND NOTIFY ENDOCRINE PROVIDER. CHECK BLOOD GLUCOSE LEVEL IN 15 MINUTES AND REPEAT ADMINISTRATION IF BLOOD GLUCOSE LEVELS ARE LESS THAN 100. REPEAT PROCESS UNTIL BLOOD GLUCOSE LEVEL IS ABOVE 100. 112.5 g 4    glucose blood (OneTouch Verio) test strip May substitute brand based on insurance coverage. Check glucose TID. 100 each 0    guaiFENesin (DIABETIC TUSSIN EX) 100 MG/5ML oral liquid 10 mL (200 mg total) by Per G Tube route 4 (four) times a day as needed for cough or congestion      hydrocortisone 1 % cream Apply topically to affected area twice daily as needed  for rash 30 g 0    Insulin Pen Needle (BD Pen Needle Yue 2nd Gen) 32G X 4 MM MISC Use to inject insulin 4 times daily 400 each 3    insulin regular (HumuLIN R,NovoLIN R) 100 units/mL injection Inject 1-5 Units under the skin every 6 (six) hours if 0 - 179 = 0; 180 - 224 = 1 unit; 225 - 299 = 2 unit; 300 - 374 = 3 unit; 375 - 449 = 4 unit; 450+ = 5 unit.  Call MD for BS less than 60 or greater than 450 10 mL 2    insulin regular (HumuLIN R,NovoLIN R) 100 units/mL injection Inject Novolin R, 2 units at 12 AM + coverage,  2 units at 6 AM + coverage , only + coverage at 6 PM 15 mL 1    lansoprazole (PREVACID SOLUTAB) 15 mg disintegrating tablet 1 tablet (15 mg total) by Per G Tube route daily Please dissolve in water. 30 tablet 0    levothyroxine 25 mcg tablet TAKE 1 TABLET VIA G-TUBE DAILY AT 4PM (HYPOTHYROIDISM) 30 tablet 10    LORazepam (Ativan) 0.5 mg tablet 1 tablet (0.5 mg total) by Per G Tube route daily at bedtime Hold for sedation 90 tablet 1    methenamine hippurate (HIPREX) 1 g tablet Crush 1 tablet two times a day per peg tube 60 tablet 12    midodrine (PROAMATINE) 5 mg tablet TAKE 1 TABLET VIA G-TUBE TWICE A DAY FOR HYPOTENSION *HOLD FOR SBP GREATER THAN 100* 60 tablet 10    neomycin-bacitracin-polymyxin b (NEOSPORIN) ointment Apply 1 application. topically as needed in the morning and 1 application. as needed in the evening. Apply to affected area BID PRN.      nystatin (MYCOSTATIN) powder Apply topically 2 (two) times a day as needed (apply to skin irritations) 60 g 3    olopatadine HCl (PATADAY) 0.2 % opth drops Administer 1 drop to both eyes as needed Instill 1 drop into affected eyes daily PRN for eye redness      oxybutynin (DITROPAN) 5 mg tablet TAKE 1 TABLET VIA G-TUBE THREE TIMES A DAY (URINARY INCONTINENCE) 84 tablet 10    polyethylene glycol (GLYCOLAX) 17 GM/SCOOP powder 17 g by Per G Tube route daily 510 g 0    Refresh Liquigel 1 % GEL in the morning and before bedtime. 1 drop Bid bilaterally .       simvastatin (ZOCOR) 20 mg tablet TAKE 1 TABLET VIA G-TUBE EVERY EVENING (HYPERLIPIDEMIA) 28 tablet 10    Syringe, Disposable, 10 ML MISC Use 3 (three) times a day To be used with tube feeding. 100 each 5    traZODone (DESYREL) 100 mg tablet 1 tablet (100 mg total) by Per G Tube route daily at bedtime 90 tablet 1    valproic acid (DEPAKENE) 250 MG/5ML soln 2.5 mL (125 mg total) by Per G Tube route in the morning and 2.5 mL (125 mg total) before bedtime. 450 mL 1    Vitamins A & D (VITAMIN A & D) ointment Apply topically as needed for skin irritation       No current facility-administered medications on file prior to visit.   [2]   Social History  Tobacco Use    Smoking status: Never    Smokeless tobacco: Never   Vaping Use    Vaping status: Never Used   Substance and Sexual Activity    Alcohol use: Not Currently    Drug use: No    Sexual activity: Never

## 2025-06-26 ENCOUNTER — OFFICE VISIT (OUTPATIENT)
Dept: GASTROENTEROLOGY | Facility: CLINIC | Age: 65
End: 2025-06-26
Payer: MEDICARE

## 2025-06-26 VITALS
BODY MASS INDEX: 19.1 KG/M2 | HEIGHT: 58 IN | SYSTOLIC BLOOD PRESSURE: 90 MMHG | DIASTOLIC BLOOD PRESSURE: 64 MMHG | WEIGHT: 91 LBS

## 2025-06-26 DIAGNOSIS — D68.2 HEREDITARY DEFICIENCY OF OTHER CLOTTING FACTORS (HCC): ICD-10-CM

## 2025-06-26 DIAGNOSIS — R13.12 OROPHARYNGEAL DYSPHAGIA: ICD-10-CM

## 2025-06-26 DIAGNOSIS — Z93.1 S/P PERCUTANEOUS ENDOSCOPIC GASTROSTOMY (PEG) TUBE PLACEMENT (HCC): Primary | ICD-10-CM

## 2025-06-26 PROCEDURE — 99213 OFFICE O/P EST LOW 20 MIN: CPT | Performed by: PHYSICIAN ASSISTANT

## 2025-06-26 PROCEDURE — G2211 COMPLEX E/M VISIT ADD ON: HCPCS | Performed by: PHYSICIAN ASSISTANT

## 2025-06-26 NOTE — PROGRESS NOTES
Name: Terrie Alicea      : 1960      MRN: [id number]  Encounter Provider: Raysa Cheney PA-C  Encounter Date: 2025   Encounter department: [facility] [place]  :  Assessment & Plan  S/P percutaneous endoscopic gastrostomy (PEG) tube placement (HCC)  Patient stable and doing well tolerating nighttime tube feeds via PEG.  She is also self feeding pleasure foods throughout the day.  PEG site looks clean, dry, intact without any evidence or signs of infection.  No additional recommendations from a GI standpoint at this time.  All questions answered.       Oropharyngeal dysphagia  As above.        Hereditary deficiency of other clotting factors (HCC)  Patient to continue to follow up with PCP. Most recent CBC unremarkable.        Patient/patient nurse Lisa was instructed to call the office with any questions, concerns, new/ worsening/ persisting GI symptoms.      Will plan to follow up in 1 year    History of Present Illness   HPI  Terrie Alicea is a 65 y.o. female with a past medical history of type 2 diabetes mellitus, hypothyroidism, cerebral palsy with severe intellectual disability and developmental delay, functional quadriplegia, hyperlipidemia, seasonal allergies, ambulatory dysfunction, bipolar disorder, Parkinson's disease, history of oropharyngeal dysphagia status post PEG tube placement surgery 2024 who presents to the office today for follow-up.  Was last seen in the office 2024 by Dr. Barroso, this office note was reviewed.    Patient nurse Lisa present for visit today. She provides history. Patient is unable to provide history.   Lisa states patient is doing well. No acute complaints today.   Lisa states patient is receiving pleasure foods self- feeding during the day.   Patient undergoes tube feeds at night.   Patient receives lansoprazole 15 mg daily per G-tube.  Patient has gained weight since last office visit.  No indicators  "of abdominal pain.   Lisa states she has been doing great. Lisa has been changing G tube as needed at [facility] .    Review of Systems   Unable to perform ROS: Other   Patient with severe intellectual disability    Objective   BP 90/64 (BP Location: Left arm, Patient Position: Sitting, Cuff Size: Standard)   Ht 4' 10\" (1.473 m)   Wt 41.3 kg (91 lb)   LMP 11/29/2009 (Exact Date)   BMI 19.02 kg/m²      Physical Exam  Vitals reviewed.   Constitutional:       General: She is not in acute distress.     Appearance: She is not ill-appearing or toxic-appearing.      Comments: In wheelchair   HENT:      Nose: Nose normal.     Eyes:      Extraocular Movements: Extraocular movements intact.      Conjunctiva/sclera: Conjunctivae normal.       Cardiovascular:      Rate and Rhythm: Normal rate and regular rhythm.   Pulmonary:      Effort: Pulmonary effort is normal. No respiratory distress.      Breath sounds: Normal breath sounds.   Abdominal:      General: Bowel sounds are normal.      Palpations: Abdomen is soft.      Tenderness: There is no abdominal tenderness.      Comments:  PEG site looks clean, dry, intact without any evidence or signs of infection.      Musculoskeletal:      Cervical back: Normal range of motion and neck supple.      Comments: Spastic quadriplegia      Skin:     General: Skin is warm and dry.      Coloration: Skin is not jaundiced.     Neurological:      Mental Status: She is alert. Mental status is at baseline.     Psychiatric:         Mood and Affect: Mood normal.         Behavior: Behavior normal.       Lab Results   Component Value Date    WBC 10.03 12/20/2024    HGB 11.4 (L) 12/20/2024    HCT 35.7 12/20/2024     (H) 12/20/2024     12/20/2024     Lab Results   Component Value Date     04/06/2016    SODIUM 143 03/24/2025    K 4.5 03/24/2025     03/24/2025    CO2 35 (H) 03/24/2025    ANIONGAP 5 10/27/2015    AGAP 6 03/24/2025    BUN 35 (H) 03/24/2025    " CREATININE 0.97 03/24/2025    GLUC 113 03/24/2025    GLUF 171 (H) 01/08/2025    CALCIUM 9.9 03/24/2025    AST 18 02/05/2025    ALT 13 02/05/2025    ALKPHOS 73 02/05/2025    PROT 7.0 04/06/2016    TP 8.4 02/05/2025    BILITOT 0.1 (L) 04/06/2016    TBILI 0.25 02/05/2025    EGFR 62 03/24/2025

## 2025-07-02 ENCOUNTER — APPOINTMENT (OUTPATIENT)
Dept: LAB | Facility: CLINIC | Age: 65
End: 2025-07-02
Payer: MEDICARE

## 2025-07-02 ENCOUNTER — TELEPHONE (OUTPATIENT)
Dept: FAMILY MEDICINE CLINIC | Facility: CLINIC | Age: 65
End: 2025-07-02

## 2025-07-02 LAB
ALBUMIN SERPL BCG-MCNC: 4.2 G/DL (ref 3.5–5)
ALP SERPL-CCNC: 68 U/L (ref 34–104)
ALT SERPL W P-5'-P-CCNC: 51 U/L (ref 7–52)
ANION GAP SERPL CALCULATED.3IONS-SCNC: 6 MMOL/L (ref 4–13)
AST SERPL W P-5'-P-CCNC: 26 U/L (ref 13–39)
BILIRUB DIRECT SERPL-MCNC: 0.08 MG/DL (ref 0–0.2)
BILIRUB SERPL-MCNC: 0.28 MG/DL (ref 0.2–1)
BUN SERPL-MCNC: 36 MG/DL (ref 5–25)
CA-I BLD-SCNC: 1.26 MMOL/L (ref 1.12–1.32)
CALCIUM SERPL-MCNC: 10.6 MG/DL (ref 8.4–10.2)
CHLORIDE SERPL-SCNC: 98 MMOL/L (ref 96–108)
CK SERPL-CCNC: 88 U/L (ref 26–192)
CO2 SERPL-SCNC: 38 MMOL/L (ref 21–32)
CREAT SERPL-MCNC: 0.96 MG/DL (ref 0.6–1.3)
GFR SERPL CREATININE-BSD FRML MDRD: 63 ML/MIN/1.73SQ M
GLUCOSE P FAST SERPL-MCNC: 115 MG/DL (ref 65–99)
POTASSIUM SERPL-SCNC: 4.5 MMOL/L (ref 3.5–5.3)
PROT SERPL-MCNC: 8.2 G/DL (ref 6.4–8.4)
SODIUM SERPL-SCNC: 142 MMOL/L (ref 135–147)

## 2025-07-02 PROCEDURE — 82330 ASSAY OF CALCIUM: CPT

## 2025-07-02 PROCEDURE — 82248 BILIRUBIN DIRECT: CPT

## 2025-07-02 PROCEDURE — 80053 COMPREHEN METABOLIC PANEL: CPT

## 2025-07-02 PROCEDURE — 82550 ASSAY OF CK (CPK): CPT

## 2025-07-02 NOTE — TELEPHONE ENCOUNTER
Patient has upcoming appointment on 7/17 for nurse visit for Prolia-shot, at 11:15 am -called leaving voice message that the appointment has been rescheduled for same day at late time 2:15 pm.    Letter sent.

## 2025-07-17 ENCOUNTER — CLINICAL SUPPORT (OUTPATIENT)
Dept: FAMILY MEDICINE CLINIC | Facility: CLINIC | Age: 65
End: 2025-07-17

## 2025-07-17 DIAGNOSIS — M81.0 OSTEOPOROSIS, UNSPECIFIED OSTEOPOROSIS TYPE, UNSPECIFIED PATHOLOGICAL FRACTURE PRESENCE: Primary | ICD-10-CM

## 2025-07-17 PROCEDURE — 96372 THER/PROPH/DIAG INJ SC/IM: CPT

## 2025-07-17 NOTE — PROGRESS NOTES
The patient is present today with Caregiver Toya for vaccine visit. We discussed the pros and cons of the vaccine(s). They have no prior reaction to vaccines. We discussed possible common reactions. The patient not experiencing fever or illness recently.     Prefilled syringe, of Denosumab (Prolia), dose 60 mg/mL, lot number [id number] , NDC number 93286-820-11, expiration date 2/29/2028  .   Skin - Injection administered on Left upper arm Subcutaneously with no erythema, normal appearing skin.     The patient tolerate the vaccine administration well. We discussed follow up precautions including but not limited to injection site redness and swelling, tongue or throat swelling, breathing problems and fever. We discussed thermometer use and over the counter medications for symptoms. Patient caregiver express and verbalized understanding.

## 2025-07-22 ENCOUNTER — OFFICE VISIT (OUTPATIENT)
Age: 65
End: 2025-07-22
Payer: MEDICARE

## 2025-07-22 ENCOUNTER — RA CDI HCC (OUTPATIENT)
Dept: OTHER | Facility: HOSPITAL | Age: 65
End: 2025-07-22

## 2025-07-22 VITALS
HEART RATE: 96 BPM | OXYGEN SATURATION: 97 % | TEMPERATURE: 98.3 F | SYSTOLIC BLOOD PRESSURE: 98 MMHG | DIASTOLIC BLOOD PRESSURE: 70 MMHG

## 2025-07-22 DIAGNOSIS — G80.3 DYSTONIC CEREBRAL PALSY (HCC): ICD-10-CM

## 2025-07-22 DIAGNOSIS — G82.50 SPASTIC QUADRIPARESIS (HCC): Primary | ICD-10-CM

## 2025-07-22 PROCEDURE — 99214 OFFICE O/P EST MOD 30 MIN: CPT | Performed by: PHYSICAL MEDICINE & REHABILITATION

## 2025-07-22 NOTE — ASSESSMENT & PLAN NOTE
Ms. Alicea is a 65yo F with CP - mixed with components of spasticity, dystonia. She has bipolar disorder/mood disturbance with impulse control, and with Parkinsonism and tardive dyskinesia. She is here today for an overview of her functional status.    Overall she is doing excellent. Her Baclofen has been increased to 20mg TID and she is tolerating this dose well. She is not fatigued, her pain is better controlled, her tightness is better controlled, and between that and therapies, she has been able to make some meaningful functional gains - feeding herself, standing with staff to assist with transfers. She still has tightness/dystonia in shoulder adduction, and in her ankles and finger/wrists, but staff finds it does not impede her care, function, and does not seem to cause distress. I reviewed there is room to increase the dosing further in the future if necessary. They also feel that with these gains and improved pain control, her mood and behavior have markedly improved. She is more responsive to questioning today.     I think at this point, we can move surveillance visits out to 1 year, and her staff is in agreement. I will see her in 1 year.

## 2025-07-22 NOTE — ASSESSMENT & PLAN NOTE
Ms. Alicea is a 63yo F with CP - mixed with components of spasticity, dystonia. She has bipolar disorder/mood disturbance with impulse control, and with Parkinsonism and tardive dyskinesia. She is here today for an overview of her functional status.    Overall she is doing excellent. Her Baclofen has been increased to 20mg TID and she is tolerating this dose well. She is not fatigued, her pain is better controlled, her tightness is better controlled, and between that and therapies, she has been able to make some meaningful functional gains - feeding herself, standing with staff to assist with transfers. She still has tightness/dystonia in shoulder adduction, and in her ankles and finger/wrists, but staff finds it does not impede her care, function, and does not seem to cause distress. I reviewed there is room to increase the dosing further in the future if necessary. They also feel that with these gains and improved pain control, her mood and behavior have markedly improved. She is more responsive to questioning today.     I think at this point, we can move surveillance visits out to 1 year, and her staff is in agreement. I will see her in 1 year.

## 2025-07-22 NOTE — PROGRESS NOTES
Name: Terrie Alicea      : 1960      MRN: [id number]  Encounter Provider: Ashley L De Padua, MD  Encounter Date: 2025   Encounter department: [facility] PHYSICAL MEDICINE AND [facility] [place]  :  Assessment & Plan  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HCC)  Dystonic cerebral palsy (HCC)    Ms. Alicea is a 65yo F with CP - mixed with components of spasticity, dystonia. She has bipolar disorder/mood disturbance with impulse control, and with Parkinsonism and tardive dyskinesia. She is here today for an overview of her functional status.    Overall she is doing excellent. Her Baclofen has been increased to 20mg TID and she is tolerating this dose well. She is not fatigued, her pain is better controlled, her tightness is better controlled, and between that and therapies, she has been able to make some meaningful functional gains - feeding herself, standing with staff to assist with transfers. She still has tightness/dystonia in shoulder adduction, and in her ankles and finger/wrists, but staff finds it does not impede her care, function, and does not seem to cause distress. I reviewed there is room to increase the dosing further in the future if necessary. They also feel that with these gains and improved pain control, her mood and behavior have markedly improved. She is more responsive to questioning today.     I think at this point, we can move surveillance visits out to 1 year, and her staff is in agreement. I will see her in 1 year.               History of Present Illness   Here with caregiver Lisa.    Last seen on 2025. Since then has been seen by her PCP, podiatry, diabetes education for caregivers, Cardiology who monitor hypotension (who decreased midodrine to BID), Neurology who note stability on sinemet and made no adjustments to Parkinsonian regimen, Urology who continued hiprex for UTI prevention and noted no recurrent UTIs recently. Also recently saw GI who note tolerance of cycled feeds  overnight with pleasure feeds during the day.     She no longer is walking. She is in Physical Therapy right now (via [facility] ), and they worked on standing, transferring, edge of bed activity/trunk control. OT worked with her on her hands and she is now feeding herself - takes extra time. She gets her nutrition primarily through the tube feeds.     5/8 Baclofen has increased to 20 TID. This has helped with her care, hand tightness, and her wrists. They found it also seemed to help with some of her frustration and irritation. They are happy at this dosing.     Her follow-up for her cataracts are on pause as she would need an inpatient admission post-operatively. Her caregiver is working on getting all this arranged.     Dependent for care/function. Her foot plates are set in some dorsiflexion to account of ankle tightness.   Up to 2 person assist  Primary wheelchair mobility, dependent for propulsion  Stand pivot transfers with staff  Enjoys coloring  Verbalizes minimally but interactive.   Uses carrots to hold in palm, along with resting hand splints.     History obtained from: patient and patient's caregiver    Review of Systems  A 10 point review of systems was negative except for what is noted in the HPI.       Objective   BP 98/70 (BP Location: Left arm, Patient Position: Sitting, Cuff Size: Standard)   Pulse 96   Temp 98.3 °F (36.8 °C) (Temporal)   LMP 11/29/2009 (Exact Date)   SpO2 97%     Gen: No acute distress  HEENT: Moist mucus membranes, tardive dyskinesia   Cardiovascular: Distal pulses palpable  Heme/Extr: No edema  Pulmonary: Non-labored breathing.  : No boateng  GI: Soft, non-tender, non-distended.  MSK: Has some limitation in AROM in bilateral shoulders. Has good passive shoulder flexion and she is able to assist with that passive range of motion. Still stiff with fingers clenched and tightness in lumbricals bilaterally. Elbows are still tight, but she is able to demonstrate elbow flexion and  extension through a functional range of motion. She is able to activate her dorsiflexion. Rests in some plantarflexion. Has good range of motion in her knees.   Integumentary: Skin is warm, dry.   Psych: Normal mood and affect.   Neuro: Awake and alert. Pleasant and cooperative. Some grimacing with attempts to shoulder abduction. Able to answer intermittently simple questions with vocalizations. Cooperative, pleasant, and even tempered today. Mod tone in her bilateral shoulder adduction, finger flexion, wrist extension, and plantarflexors.     Results  No new pertinent imaging since previously seen.

## 2025-07-22 NOTE — ASSESSMENT & PLAN NOTE
Improved with outbursts consisting of screaming/yelling and crying - will continue to monitor  - Continue Depakene 250 mg twice daily for mood stabilization, could consider increase if needed  - Continue Lexapro 20 mg once daily for depressed mood

## 2025-07-22 NOTE — PSYCH
"MEDICATION MANAGEMENT NOTE        [facility]      Name and Date of Birth:  Terrie Alicea 65 y.o. 1960 MRN: [id number]    Date of Visit: July 23, 2025    Subjective:    Patient was seen for follow-up examination and the chart was reviewed.  Patient was seen with Anna her behavioral specialist as well as Lisa her nurse.  They discussed that her behaviors continued to remain in control, it seems that her baclofen was increased to 20 mg 3 times a day and they have noticed improvements in that she is able to feed herself much more easily (before she would have trouble feeding herself) and she seems more calm, has not had bouts of tearfulness.  Patient is very minimally verbal and has reduced eye contact, states mood is \"good\".  They discussed that she had a therapist in the previous practice who she liked very much and he would do child therapy with her and give her coloring books and allow her to choose things, they feel that when she comes here she is anticipating seeing this therapist and appears sad because she is not able to see him.  They stated on the ride there she was asking for her stuffed animals and dolls (she calls \"babies\") that she has a collection of at home.    We agreed to continue her medications as below with potential to increase Depakote in the future if ever needed.  Depakote level is low but patient is clinically obtaining benefit.  Only labs she is due for her as a CBC and they will obtain this, have printed the prescription for them.    She denies any side effects from medications.    Psychiatric Review Of Systems:     Sleep changes: no  Appetite changes: no  Auditory hallucinations: no  Visual hallucinations: no  Delusional thinking: no    Suicidal ideation: no  Homicidal ideation: no    Medical Review Of Systems:    Constitutional negative   ENT negative   Cardiovascular negative   Respiratory negative   Gastrointestinal negative " "  Genitourinary negative   Musculoskeletal negative   Integumentary negative   Neurological negative   Endocrine negative   Other Symptoms none, all other systems are negative       Objective:    Vital signs in last 24 hours:    There were no vitals filed for this visit.    Mental Status Evaluation:    Appearance age appropriate, casually dressed   Behavior cooperative, calm   Speech Very limited speech, says 1 word at a time and difficult to understand, can say \"yes and no\".   Mood \"Good\"   Affect Constricted   Thought Processes Could not assess as patient is minimally verbal   Associations Could not assess as patient is minimally verbal   Thought Content Could not assess as patient is minimally verbal   Perceptual Disturbances: no auditory hallucinations, no visual hallucinations   Abnormal Thoughts  Risk Potential Suicidal ideation - None  Homicidal ideation - None  Potential for aggression - No   Orientation Could not assess as patient is minimally verbal   Memory Could not assess as patient is minimally verbal   Consciousness alert and awake   Attention Span Concentration Span decreased   Intellect Not formally assessed   Insight Could not assess as patient is minimally verbal   Judgement Could not assess as patient is minimally verbal   Language No aphasia   Fund of Knowledge Could not assess as patient is minimally verbal       Laboratory Results: I have reviewed the following laboratory results.    CBC:   Lab Results   Component Value Date    WBC 10.03 12/20/2024    RBC 3.52 (L) 12/20/2024    HGB 11.4 (L) 12/20/2024    HCT 35.7 12/20/2024     (H) 12/20/2024     12/20/2024    MCH 32.4 12/20/2024    MCHC 31.9 12/20/2024    RDW 13.2 12/20/2024    MPV 12.5 12/20/2024    NEUTROABS 6.92 12/20/2024    TOTANEUTABS 7.79 (H) 10/29/2024     BMP:   Lab Results   Component Value Date    SODIUM 142 07/02/2025    K 4.5 07/02/2025    CL 98 07/02/2025    CO2 38 (H) 07/02/2025    AGAP 6 07/02/2025    BUN 36 (H) " 07/02/2025    CREATININE 0.96 07/02/2025    GLUC 113 03/24/2025    GLUF 115 (H) 07/02/2025    CALCIUM 10.6 (H) 07/02/2025    EGFR 63 07/02/2025     CMP:   Lab Results   Component Value Date    SODIUM 142 07/02/2025    K 4.5 07/02/2025    CL 98 07/02/2025    CO2 38 (H) 07/02/2025    AGAP 6 07/02/2025    BUN 36 (H) 07/02/2025    CREATININE 0.96 07/02/2025    GLUC 113 03/24/2025    GLUF 115 (H) 07/02/2025    CALCIUM 10.6 (H) 07/02/2025    AST 26 07/02/2025    ALT 51 07/02/2025    ALKPHOS 68 07/02/2025    TP 8.2 07/02/2025    ALB 4.2 07/02/2025    TBILI 0.28 07/02/2025    EGFR 63 07/02/2025     Lipid Profile:   Lab Results   Component Value Date    CHOLESTEROL 177 12/20/2024    HDL 54 12/20/2024    TRIG 124 12/20/2024    LDLCALC 98 12/20/2024    NONHDLC 116 09/14/2022     Hemoglobin A1C:   Lab Results   Component Value Date    HGBA1C 7.4 (H) 03/24/2025     03/24/2025     Liver Enzymes:   Lab Results   Component Value Date    AST 26 07/02/2025    ALT 51 07/02/2025    ALKPHOS 68 07/02/2025    PROT 7.0 04/06/2016    BILITOT 0.1 (L) 04/06/2016     Thyroid Studies:   Lab Results   Component Value Date    LUR3IIXHGCME 2.764 12/20/2024    T3FREE 1.05 (L) 01/31/2016    FREET4 1.08 10/18/2024    J0FDDQC 0.90 03/07/2019    V8NSXXT 9.5 03/07/2019     Vitamin D Level   Lab Results   Component Value Date    GHYD77JTDCXX 76.1 01/08/2025     Vitamin B12   Lab Results   Component Value Date    YYUWZMSE23 1,190 (H) 11/14/2024     EKG   Lab Results   Component Value Date    VENTRATE 91 11/25/2024    ATRIALRATE 91 11/25/2024    PRINT 128 11/25/2024    QRSDINT 78 11/25/2024    QTINT 346 11/25/2024    PAXIS -14 11/25/2024    QRSAXIS 56 11/25/2024    TWAVEAXIS 40 11/25/2024       Suicide/Homicide Risk Assessment:    Risk of Harm to Self:  The following ratings are based on assessment at the time of the interview  Demographic risk factors include:   Historical Risk Factors include: history of mood disorder  Recent Specific  Risk Factors include: current possible depressive symptoms, unclear  Protective Factors: access to mental health treatment, compliant with medications, compliant with mental health treatment, restricted access to lethal means, stable living environment, patient lives in a group setting and has caretaker, very well-supported  Weapons: none. The following steps have been taken to ensure weapons are properly secured: not applicable  Based on today's assessment, Terrie presents the following risk of harm to self: minimal     Risk of Harm to Others:  The following ratings are based on assessment at the time of the interview  Demographic Risk Factors include: none.  Historical Risk Factors include: history of aggression towards others in setting of intellectual disability and CP  Recent Specific Risk Factors include: none.  Protective Factors: access to mental health treatment, compliant with medications, compliant with mental health treatment, restricted access to lethal means, stable living environment, patient lives in a group setting and has caretaker, very well-supported  Weapons: none. The following steps have been taken to ensure weapons are properly secured: not applicable  Based on today's assessment, Terrie presents the following risk of harm to others: minimal       The following interventions are recommended: continue medication management      Assessment/Plan:      Diagnoses and all orders for this visit:    Bipolar affective disorder, remission status unspecified (HCC)    Spastic quadriplegic cerebral palsy (HCC)    Dystonic cerebral palsy (HCC)    Anxiety    Psychophysiological insomnia    On valproic acid therapy  -     CBC and differential; Future    Long-term current use of high risk medication other than anticoagulant  -     CBC and differential; Future          Assessment & Plan  Spastic quadriplegic cerebral palsy (HCC)            Dystonic cerebral palsy (HCC)            Bipolar affective disorder,  remission status unspecified (HCC)  Improved with outbursts consisting of screaming/yelling and crying - will continue to monitor  - Continue Depakene 250 mg twice daily for mood stabilization, could consider increase if needed  - Continue Lexapro 20 mg once daily for depressed mood             Anxiety            Psychophysiological insomnia  At goal  - Continue trazodone 100 mg at nighttime for insomnia  -Continue Ativan 0.5 mg at bedtime for anxiety and insomnia, patient takes every night             On valproic acid therapy  - they will obtain CBC as below - CMP and valproic acid level are current.  Orders:    CBC and differential; Future    Long-term current use of high risk medication other than anticoagulant  - they will obtain CBC as below - CMP and valproic acid level are current.  Orders:    CBC and differential; Future               Patient to follow-up in 2 months - 3 months.   Aware of 24 hour and weekend coverage for urgent situations accessed by calling [facility] main practice number    Medications Risks/Benefits:    Risks, Benefits And Possible Side Effects Of Medications:  Risks, benefits, and possible side effects of medications explained to Terrie and she verbalizes understanding and agreement for treatment.    Controlled Medication Discussion:   Terrie has been filling controlled prescriptions on time as prescribed according to [place] Prescription Drug Monitoring Program    Treatment Plan:  Completed and signed during the session: Not applicable - Treatment Plan not due at this session    This note was not shared with the patient due to this is a psychotherapy note     Visit Time  Visit Start Time: 9:00 AM  Visit Stop Time: 9:30 AM  Total Visit Duration: 30 minutes    Neelam Franco MD 07/23/25    This note has been constructed in part using a voice recognition system.   There may be translation, syntax, or grammatical errors. If you have any questions, please  contact the dictating provider.

## 2025-07-22 NOTE — PROGRESS NOTES
Review of Systems   Constitutional: Negative.    HENT: Negative.     Respiratory: Negative.     Genitourinary: Negative.    Neurological: Negative.

## 2025-07-23 ENCOUNTER — OFFICE VISIT (OUTPATIENT)
Dept: PSYCHIATRY | Facility: CLINIC | Age: 65
End: 2025-07-23
Payer: MEDICARE

## 2025-07-23 DIAGNOSIS — G80.3 DYSTONIC CEREBRAL PALSY (HCC): ICD-10-CM

## 2025-07-23 DIAGNOSIS — G80.0 SPASTIC QUADRIPLEGIC CEREBRAL PALSY (HCC): ICD-10-CM

## 2025-07-23 DIAGNOSIS — F51.04 PSYCHOPHYSIOLOGICAL INSOMNIA: ICD-10-CM

## 2025-07-23 DIAGNOSIS — Z79.899 ON VALPROIC ACID THERAPY: ICD-10-CM

## 2025-07-23 DIAGNOSIS — F31.9 BIPOLAR AFFECTIVE DISORDER, REMISSION STATUS UNSPECIFIED (HCC): Primary | ICD-10-CM

## 2025-07-23 DIAGNOSIS — Z79.899 LONG-TERM CURRENT USE OF HIGH RISK MEDICATION OTHER THAN ANTICOAGULANT: ICD-10-CM

## 2025-07-23 DIAGNOSIS — F41.9 ANXIETY: ICD-10-CM

## 2025-07-23 PROCEDURE — 99214 OFFICE O/P EST MOD 30 MIN: CPT | Performed by: STUDENT IN AN ORGANIZED HEALTH CARE EDUCATION/TRAINING PROGRAM

## 2025-07-24 RX ORDER — ESCITALOPRAM OXALATE 20 MG/1
20 TABLET ORAL
COMMUNITY
Start: 2025-07-02

## 2025-07-24 RX ORDER — PEN NEEDLE, DIABETIC, SAFETY 30 GX3/16"
NEEDLE, DISPOSABLE MISCELLANEOUS
COMMUNITY
Start: 2025-07-02

## 2025-07-24 RX ORDER — BENZOCAINE 7.5; 5 MG/1; MG/1
LOZENGE ORAL
COMMUNITY
Start: 2025-07-21

## 2025-07-24 RX ORDER — HUMAN INSULIN 100 [IU]/ML
INJECTION, SOLUTION SUBCUTANEOUS
COMMUNITY
Start: 2025-07-14 | End: 2025-07-28

## 2025-07-28 ENCOUNTER — OFFICE VISIT (OUTPATIENT)
Dept: ENDOCRINOLOGY | Facility: CLINIC | Age: 65
End: 2025-07-28
Payer: MEDICARE

## 2025-07-28 VITALS
HEART RATE: 76 BPM | SYSTOLIC BLOOD PRESSURE: 96 MMHG | HEIGHT: 58 IN | OXYGEN SATURATION: 96 % | DIASTOLIC BLOOD PRESSURE: 68 MMHG | BODY MASS INDEX: 19.02 KG/M2

## 2025-07-28 DIAGNOSIS — E03.9 ACQUIRED HYPOTHYROIDISM: ICD-10-CM

## 2025-07-28 DIAGNOSIS — E11.9 DIABETES MELLITUS TYPE 2, INSULIN DEPENDENT (HCC): ICD-10-CM

## 2025-07-28 DIAGNOSIS — N18.31 CHRONIC KIDNEY DISEASE, STAGE 3A (HCC): ICD-10-CM

## 2025-07-28 DIAGNOSIS — Z79.4 DIABETES MELLITUS TYPE 2, INSULIN DEPENDENT (HCC): ICD-10-CM

## 2025-07-28 DIAGNOSIS — E78.2 MIXED HYPERLIPIDEMIA: ICD-10-CM

## 2025-07-28 DIAGNOSIS — M81.0 OSTEOPOROSIS, UNSPECIFIED OSTEOPOROSIS TYPE, UNSPECIFIED PATHOLOGICAL FRACTURE PRESENCE: Primary | ICD-10-CM

## 2025-07-28 DIAGNOSIS — Z79.4 TYPE 2 DIABETES MELLITUS WITH DIABETIC POLYNEUROPATHY, WITH LONG-TERM CURRENT USE OF INSULIN (HCC): Primary | ICD-10-CM

## 2025-07-28 DIAGNOSIS — E11.42 TYPE 2 DIABETES MELLITUS WITH DIABETIC POLYNEUROPATHY, WITH LONG-TERM CURRENT USE OF INSULIN (HCC): Primary | ICD-10-CM

## 2025-07-28 PROCEDURE — 95251 CONT GLUC MNTR ANALYSIS I&R: CPT | Performed by: PHYSICIAN ASSISTANT

## 2025-07-28 PROCEDURE — 99214 OFFICE O/P EST MOD 30 MIN: CPT | Performed by: PHYSICIAN ASSISTANT

## 2025-07-31 ENCOUNTER — APPOINTMENT (OUTPATIENT)
Dept: LAB | Facility: CLINIC | Age: 65
End: 2025-07-31
Payer: MEDICARE

## 2025-07-31 DIAGNOSIS — Z79.4 DIABETES MELLITUS TYPE 2, INSULIN DEPENDENT (HCC): ICD-10-CM

## 2025-07-31 DIAGNOSIS — Z79.899 LONG-TERM CURRENT USE OF HIGH RISK MEDICATION OTHER THAN ANTICOAGULANT: ICD-10-CM

## 2025-07-31 DIAGNOSIS — Z79.4 TYPE 2 DIABETES MELLITUS WITH DIABETIC POLYNEUROPATHY, WITH LONG-TERM CURRENT USE OF INSULIN (HCC): ICD-10-CM

## 2025-07-31 DIAGNOSIS — Z79.899 ON VALPROIC ACID THERAPY: ICD-10-CM

## 2025-07-31 DIAGNOSIS — E03.9 ACQUIRED HYPOTHYROIDISM: ICD-10-CM

## 2025-07-31 DIAGNOSIS — E11.42 TYPE 2 DIABETES MELLITUS WITH DIABETIC POLYNEUROPATHY, WITH LONG-TERM CURRENT USE OF INSULIN (HCC): ICD-10-CM

## 2025-07-31 DIAGNOSIS — E11.9 DIABETES MELLITUS TYPE 2, INSULIN DEPENDENT (HCC): ICD-10-CM

## 2025-07-31 DIAGNOSIS — M81.0 OSTEOPOROSIS, UNSPECIFIED OSTEOPOROSIS TYPE, UNSPECIFIED PATHOLOGICAL FRACTURE PRESENCE: ICD-10-CM

## 2025-07-31 LAB
ANION GAP SERPL CALCULATED.3IONS-SCNC: 6 MMOL/L (ref 4–13)
BASOPHILS # BLD AUTO: 0.05 THOUSANDS/ÂΜL (ref 0–0.1)
BASOPHILS NFR BLD AUTO: 1 % (ref 0–1)
BUN SERPL-MCNC: 34 MG/DL (ref 5–25)
CALCIUM SERPL-MCNC: 9.6 MG/DL (ref 8.4–10.2)
CHLORIDE SERPL-SCNC: 102 MMOL/L (ref 96–108)
CO2 SERPL-SCNC: 36 MMOL/L (ref 21–32)
CREAT SERPL-MCNC: 0.94 MG/DL (ref 0.6–1.3)
EOSINOPHIL # BLD AUTO: 0.19 THOUSAND/ÂΜL (ref 0–0.61)
EOSINOPHIL NFR BLD AUTO: 4 % (ref 0–6)
ERYTHROCYTE [DISTWIDTH] IN BLOOD BY AUTOMATED COUNT: 13.6 % (ref 11.6–15.1)
EST. AVERAGE GLUCOSE BLD GHB EST-MCNC: 177 MG/DL
GFR SERPL CREATININE-BSD FRML MDRD: 64 ML/MIN/1.73SQ M
GLUCOSE P FAST SERPL-MCNC: 108 MG/DL (ref 65–99)
HBA1C MFR BLD: 7.8 %
HCT VFR BLD AUTO: 40 % (ref 34.8–46.1)
HGB BLD-MCNC: 12.7 G/DL (ref 11.5–15.4)
IMM GRANULOCYTES # BLD AUTO: 0.01 THOUSAND/UL (ref 0–0.2)
IMM GRANULOCYTES NFR BLD AUTO: 0 % (ref 0–2)
LYMPHOCYTES # BLD AUTO: 1.67 THOUSANDS/ÂΜL (ref 0.6–4.47)
LYMPHOCYTES NFR BLD AUTO: 31 % (ref 14–44)
MCH RBC QN AUTO: 31.9 PG (ref 26.8–34.3)
MCHC RBC AUTO-ENTMCNC: 31.8 G/DL (ref 31.4–37.4)
MCV RBC AUTO: 101 FL (ref 82–98)
MONOCYTES # BLD AUTO: 0.39 THOUSAND/ÂΜL (ref 0.17–1.22)
MONOCYTES NFR BLD AUTO: 7 % (ref 4–12)
NEUTROPHILS # BLD AUTO: 3.01 THOUSANDS/ÂΜL (ref 1.85–7.62)
NEUTS SEG NFR BLD AUTO: 57 % (ref 43–75)
NRBC BLD AUTO-RTO: 0 /100 WBCS
PLATELET # BLD AUTO: 139 THOUSANDS/UL (ref 149–390)
PMV BLD AUTO: 14.2 FL (ref 8.9–12.7)
POTASSIUM SERPL-SCNC: 4.9 MMOL/L (ref 3.5–5.3)
RBC # BLD AUTO: 3.98 MILLION/UL (ref 3.81–5.12)
SODIUM SERPL-SCNC: 144 MMOL/L (ref 135–147)
TSH SERPL DL<=0.05 MIU/L-ACNC: 1.45 UIU/ML (ref 0.45–4.5)
WBC # BLD AUTO: 5.32 THOUSAND/UL (ref 4.31–10.16)

## 2025-07-31 PROCEDURE — 85025 COMPLETE CBC W/AUTO DIFF WBC: CPT

## 2025-07-31 PROCEDURE — 83036 HEMOGLOBIN GLYCOSYLATED A1C: CPT

## 2025-07-31 PROCEDURE — 80048 BASIC METABOLIC PNL TOTAL CA: CPT

## 2025-07-31 PROCEDURE — 84443 ASSAY THYROID STIM HORMONE: CPT

## 2025-07-31 PROCEDURE — 36415 COLL VENOUS BLD VENIPUNCTURE: CPT

## 2025-08-01 ENCOUNTER — APPOINTMENT (OUTPATIENT)
Dept: LAB | Facility: CLINIC | Age: 65
End: 2025-08-01
Payer: MEDICARE

## 2025-08-01 DIAGNOSIS — Z79.899 LONG-TERM CURRENT USE OF HIGH RISK MEDICATION OTHER THAN ANTICOAGULANT: ICD-10-CM

## 2025-08-01 DIAGNOSIS — E03.9 ACQUIRED HYPOTHYROIDISM: ICD-10-CM

## 2025-08-01 DIAGNOSIS — E11.42 TYPE 2 DIABETES MELLITUS WITH DIABETIC POLYNEUROPATHY, WITH LONG-TERM CURRENT USE OF INSULIN (HCC): ICD-10-CM

## 2025-08-01 DIAGNOSIS — E11.9 DIABETES MELLITUS TYPE 2, INSULIN DEPENDENT (HCC): ICD-10-CM

## 2025-08-01 DIAGNOSIS — Z79.4 DIABETES MELLITUS TYPE 2, INSULIN DEPENDENT (HCC): ICD-10-CM

## 2025-08-01 DIAGNOSIS — Z79.899 ON VALPROIC ACID THERAPY: ICD-10-CM

## 2025-08-01 DIAGNOSIS — M81.0 OSTEOPOROSIS, UNSPECIFIED OSTEOPOROSIS TYPE, UNSPECIFIED PATHOLOGICAL FRACTURE PRESENCE: ICD-10-CM

## 2025-08-01 DIAGNOSIS — Z79.4 TYPE 2 DIABETES MELLITUS WITH DIABETIC POLYNEUROPATHY, WITH LONG-TERM CURRENT USE OF INSULIN (HCC): ICD-10-CM

## 2025-08-01 LAB — CA-I BLD-SCNC: 1.23 MMOL/L (ref 1.12–1.32)

## 2025-08-01 PROCEDURE — 36415 COLL VENOUS BLD VENIPUNCTURE: CPT

## 2025-08-01 PROCEDURE — 82330 ASSAY OF CALCIUM: CPT

## 2025-08-04 ENCOUNTER — ANNUAL EXAM (OUTPATIENT)
Dept: FAMILY MEDICINE CLINIC | Facility: CLINIC | Age: 65
End: 2025-08-04

## 2025-08-04 VITALS
BODY MASS INDEX: 19.86 KG/M2 | SYSTOLIC BLOOD PRESSURE: 90 MMHG | HEART RATE: 65 BPM | OXYGEN SATURATION: 97 % | RESPIRATION RATE: 16 BRPM | WEIGHT: 95 LBS | DIASTOLIC BLOOD PRESSURE: 62 MMHG | TEMPERATURE: 98 F

## 2025-08-04 DIAGNOSIS — Z12.4 CERVICAL CANCER SCREENING: ICD-10-CM

## 2025-08-04 DIAGNOSIS — Z01.419 WELL WOMAN EXAM WITH ROUTINE GYNECOLOGICAL EXAM: Primary | ICD-10-CM

## 2025-08-04 PROCEDURE — 99396 PREV VISIT EST AGE 40-64: CPT | Performed by: FAMILY MEDICINE

## 2025-08-04 PROCEDURE — G0145 SCR C/V CYTO,THINLAYER,RESCR: HCPCS

## 2025-08-04 PROCEDURE — G0476 HPV COMBO ASSAY CA SCREEN: HCPCS

## 2025-08-07 LAB
LAB AP GYN PRIMARY INTERPRETATION: NORMAL
Lab: NORMAL

## 2025-08-25 PROBLEM — N18.2 CKD (CHRONIC KIDNEY DISEASE) STAGE 2, GFR 60-89 ML/MIN: Status: ACTIVE | Noted: 2025-07-28

## (undated) DEVICE — SUT PDS II 1 CTX 36 IN Z371T

## (undated) DEVICE — GAUZE SPONGES,16 PLY: Brand: CURITY

## (undated) DEVICE — NEEDLE HYPO 23G X 1-1/2 IN

## (undated) DEVICE — TOWEL SURG XR DETECT GREEN STRL RFD

## (undated) DEVICE — INTENDED FOR TISSUE SEPARATION, AND OTHER PROCEDURES THAT REQUIRE A SHARP SURGICAL BLADE TO PUNCTURE OR CUT.: Brand: BARD-PARKER SAFETY BLADES SIZE 15, STERILE

## (undated) DEVICE — SUT MONOCRYL 4-0 PS-2 27 IN Y426H

## (undated) DEVICE — PROXIMATE SKIN STAPLERS (35 WIDE) CONTAINS 35 STAINLESS STEEL STAPLES (FIXED HEAD): Brand: PROXIMATE

## (undated) DEVICE — GLOVE SRG BIOGEL ORTHOPEDIC 8

## (undated) DEVICE — DRESSING EXUDERM SATIN HYDROCOLLOID 4 X 4 IN

## (undated) DEVICE — CHLORAPREP HI-LITE 26ML ORANGE

## (undated) DEVICE — ANTIBACTERIAL UNDYED BRAIDED (POLYGLACTIN 910), SYNTHETIC ABSORBABLE SUTURE: Brand: COATED VICRYL

## (undated) DEVICE — SYRINGE 20ML LL

## (undated) DEVICE — BETHLEHEM UNIVERSAL MINOR GEN: Brand: CARDINAL HEALTH

## (undated) DEVICE — DECANTER: Brand: UNBRANDED

## (undated) DEVICE — DRAIN SPONGES,6 PLY: Brand: EXCILON

## (undated) DEVICE — SUT SILK 3-0 SH 30 IN K832H

## (undated) DEVICE — PLUMEPEN PRO 10FT

## (undated) DEVICE — SUT ETHILON 3-0 FS-1 18 IN 663G

## (undated) DEVICE — SUT VICRYL 0 CT-1 36 IN J946H

## (undated) DEVICE — NEEDLE SPINAL 22G X 3.5IN  QUINCKE

## (undated) DEVICE — SYRINGE CATH TIP 50ML

## (undated) DEVICE — POOLE SUCTION HANDLE: Brand: CARDINAL HEALTH

## (undated) DEVICE — EXOFIN PRECISION PEN HIGH VISCOSITY TOPICAL SKIN ADHESIVE: Brand: EXOFIN PRECISION PEN, 1G

## (undated) DEVICE — TUBING SUCTION 5MM X 12 FT